# Patient Record
Sex: MALE | Race: WHITE | NOT HISPANIC OR LATINO | Employment: FULL TIME | URBAN - METROPOLITAN AREA
[De-identification: names, ages, dates, MRNs, and addresses within clinical notes are randomized per-mention and may not be internally consistent; named-entity substitution may affect disease eponyms.]

---

## 2017-11-01 ENCOUNTER — TRANSCRIBE ORDERS (OUTPATIENT)
Dept: ADMINISTRATIVE | Facility: HOSPITAL | Age: 57
End: 2017-11-01

## 2017-11-01 DIAGNOSIS — N50.89 LUMP IN SCROTUM: Primary | ICD-10-CM

## 2017-11-06 ENCOUNTER — HOSPITAL ENCOUNTER (OUTPATIENT)
Dept: ULTRASOUND IMAGING | Facility: HOSPITAL | Age: 57
Discharge: HOME | End: 2017-11-06
Attending: FAMILY MEDICINE
Payer: COMMERCIAL

## 2017-11-06 DIAGNOSIS — N50.89 LUMP IN SCROTUM: ICD-10-CM

## 2017-11-06 PROCEDURE — 76870 US EXAM SCROTUM: CPT

## 2019-08-13 ENCOUNTER — APPOINTMENT (OUTPATIENT)
Dept: RADIOLOGY | Facility: CLINIC | Age: 59
End: 2019-08-13
Payer: COMMERCIAL

## 2019-08-13 DIAGNOSIS — M54.6 PAIN IN THORACIC SPINE: ICD-10-CM

## 2019-08-13 PROCEDURE — 71046 X-RAY EXAM CHEST 2 VIEWS: CPT

## 2019-08-13 PROCEDURE — 72072 X-RAY EXAM THORAC SPINE 3VWS: CPT

## 2021-03-10 DIAGNOSIS — Z23 ENCOUNTER FOR IMMUNIZATION: ICD-10-CM

## 2021-07-10 ENCOUNTER — HOSPITAL ENCOUNTER (OUTPATIENT)
Dept: ULTRASOUND IMAGING | Facility: HOSPITAL | Age: 61
Discharge: HOME | End: 2021-07-10
Attending: FAMILY MEDICINE
Payer: COMMERCIAL

## 2021-07-10 DIAGNOSIS — K76.0 FATTY (CHANGE OF) LIVER, NOT ELSEWHERE CLASSIFIED: ICD-10-CM

## 2021-07-10 DIAGNOSIS — N28.1 CYST OF KIDNEY, ACQUIRED: ICD-10-CM

## 2021-07-10 PROCEDURE — 76700 US EXAM ABDOM COMPLETE: CPT

## 2021-10-11 ENCOUNTER — CONSULT (OUTPATIENT)
Dept: GASTROENTEROLOGY | Facility: CLINIC | Age: 61
End: 2021-10-11
Payer: COMMERCIAL

## 2021-10-11 VITALS
BODY MASS INDEX: 29.49 KG/M2 | HEART RATE: 78 BPM | WEIGHT: 206 LBS | DIASTOLIC BLOOD PRESSURE: 82 MMHG | HEIGHT: 70 IN | SYSTOLIC BLOOD PRESSURE: 150 MMHG

## 2021-10-11 DIAGNOSIS — K76.0 FATTY LIVER: Primary | ICD-10-CM

## 2021-10-11 DIAGNOSIS — Z98.890 HISTORY OF COLONOSCOPY: ICD-10-CM

## 2021-10-11 PROCEDURE — 99203 OFFICE O/P NEW LOW 30 MIN: CPT | Performed by: NURSE PRACTITIONER

## 2021-10-11 RX ORDER — CYANOCOBALAMIN (VITAMIN B-12) 500 MCG
TABLET ORAL DAILY
COMMUNITY

## 2021-10-11 RX ORDER — ATORVASTATIN CALCIUM 20 MG/1
20 TABLET, FILM COATED ORAL DAILY
COMMUNITY

## 2021-10-11 RX ORDER — ASPIRIN 81 MG/1
TABLET ORAL DAILY
COMMUNITY

## 2021-10-11 RX ORDER — LOSARTAN POTASSIUM 100 MG/1
25 TABLET ORAL DAILY
COMMUNITY

## 2021-10-11 RX ORDER — OMEPRAZOLE 20 MG/1
10 CAPSULE, DELAYED RELEASE ORAL DAILY
COMMUNITY

## 2021-10-22 LAB
LEFT EYE DIABETIC RETINOPATHY: NORMAL
RIGHT EYE DIABETIC RETINOPATHY: NORMAL

## 2022-04-20 ENCOUNTER — TELEPHONE (OUTPATIENT)
Dept: ENDOCRINOLOGY | Facility: HOSPITAL | Age: 62
End: 2022-04-20

## 2022-04-20 NOTE — TELEPHONE ENCOUNTER
Patient doesn't want to want till July  He'll call [address] office to see if he can get in soon

## 2022-06-14 ENCOUNTER — OFFICE VISIT (OUTPATIENT)
Dept: ENDOCRINOLOGY | Facility: CLINIC | Age: 62
End: 2022-06-14
Payer: COMMERCIAL

## 2022-06-14 ENCOUNTER — TELEPHONE (OUTPATIENT)
Dept: ADMINISTRATIVE | Facility: OTHER | Age: 62
End: 2022-06-14

## 2022-06-14 VITALS
DIASTOLIC BLOOD PRESSURE: 88 MMHG | BODY MASS INDEX: 28.92 KG/M2 | WEIGHT: 202 LBS | HEART RATE: 72 BPM | HEIGHT: 70 IN | SYSTOLIC BLOOD PRESSURE: 132 MMHG

## 2022-06-14 DIAGNOSIS — E55.9 VITAMIN D DEFICIENCY: ICD-10-CM

## 2022-06-14 DIAGNOSIS — E78.2 MIXED HYPERLIPIDEMIA: ICD-10-CM

## 2022-06-14 DIAGNOSIS — R53.83 FATIGUE, UNSPECIFIED TYPE: ICD-10-CM

## 2022-06-14 DIAGNOSIS — K76.0 NAFLD (NONALCOHOLIC FATTY LIVER DISEASE): ICD-10-CM

## 2022-06-14 DIAGNOSIS — E11.65 TYPE 2 DIABETES MELLITUS WITH HYPERGLYCEMIA, WITHOUT LONG-TERM CURRENT USE OF INSULIN (HCC): Primary | ICD-10-CM

## 2022-06-14 DIAGNOSIS — I10 HTN, GOAL BELOW 130/80: ICD-10-CM

## 2022-06-14 LAB — SL AMB POCT HEMOGLOBIN AIC: 6.4 (ref ?–6.5)

## 2022-06-14 PROCEDURE — 99204 OFFICE O/P NEW MOD 45 MIN: CPT | Performed by: INTERNAL MEDICINE

## 2022-06-14 PROCEDURE — 83036 HEMOGLOBIN GLYCOSYLATED A1C: CPT | Performed by: INTERNAL MEDICINE

## 2022-06-14 RX ORDER — METFORMIN HYDROCHLORIDE 500 MG/1
TABLET, EXTENDED RELEASE ORAL 2 TIMES DAILY
COMMUNITY
Start: 2022-04-24

## 2022-06-14 NOTE — PROGRESS NOTES
New Consult Note      CC: diabetes    History of Present Illness:   57 yr male with hx of type 2 diabetes, HTN, HLD, vitamin D deficiency, NAFLD, GERD and STAN  He was diagnosed with diabetes in 2020 after a few years of prediabetes  He reports mostly fasting hyperglycemia  He is unable to comply with CPAP  No symptoms reported  Home blood glucose monitoring: checks 3/day  Usually fasting in 130-150mg/dL range and postprandial in 100-200mg/dL range based on activity  Hypoglycemia: no    Current meds:  Metformin 1000mg po qdaily    Opthamology: no  Podiatry: no  vaccination: yes  Dental:caries tooth  Pancreatitis: no    Ace/ARB: losartan  Statin:lipitor  Thyroid issues: not known    There is no problem list on file for this patient      Past Medical History:   Diagnosis Date    Colon polyp     GERD (gastroesophageal reflux disease)     Hyperlipidemia     Hypertension       Past Surgical History:   Procedure Laterality Date    COLONOSCOPY        Family History   Problem Relation Age of Onset    Diabetes Paternal Grandfather     Colon polyps Neg Hx     Colon cancer Neg Hx      Social History     Tobacco Use    Smoking status: Never Smoker    Smokeless tobacco: Never Used   Substance Use Topics    Alcohol use: Yes     Comment: socially     No Known Allergies      Current Outpatient Medications:     aspirin (ECOTRIN LOW STRENGTH) 81 mg EC tablet, Take by mouth daily 1/2 tablet daily, Disp: , Rfl:     atorvastatin (LIPITOR) 20 mg tablet, Take 20 mg by mouth daily, Disp: , Rfl:     Cyanocobalamin (Vitamin B 12) 500 MCG TABS, Take by mouth daily, Disp: , Rfl:     losartan (COZAAR) 100 MG tablet, Take 25 mg by mouth daily, Disp: , Rfl:     metFORMIN (GLUCOPHAGE-XR) 500 mg 24 hr tablet, 2 (two) times a day, Disp: , Rfl:     omeprazole (PriLOSEC) 20 mg delayed release capsule, Take 10 mg by mouth daily, Disp: , Rfl:     Review of Systems   Constitutional: Positive for activity change, appetite change and fatigue  HENT: Negative  Eyes: Negative  Respiratory: Negative  Cardiovascular: Negative for chest pain  Gastrointestinal: Negative  Endocrine: Negative  Genitourinary: Negative  Musculoskeletal: Negative  Skin: Negative  Allergic/Immunologic: Negative  Neurological: Negative  Hematological: Negative  Psychiatric/Behavioral: Negative  All other systems reviewed and are negative  Physical Exam:  Body mass index is 28 98 kg/m²  /88 (BP Location: Left arm, Patient Position: Sitting, Cuff Size: Standard)   Pulse 72   Ht 5' 10" (1 778 m)   Wt 91 6 kg (202 lb)   BMI 28 98 kg/m²    Vitals:    06/14/22 1000   Weight: 91 6 kg (202 lb)        Physical Exam  Constitutional:       Appearance: He is well-developed  HENT:      Head: Normocephalic  Eyes:      Pupils: Pupils are equal, round, and reactive to light  Neck:      Thyroid: No thyromegaly  Cardiovascular:      Rate and Rhythm: Normal rate  Heart sounds: Normal heart sounds  Pulmonary:      Effort: Pulmonary effort is normal       Breath sounds: Normal breath sounds  Abdominal:      General: Bowel sounds are normal       Palpations: Abdomen is soft  Musculoskeletal:         General: No deformity  Cervical back: Normal range of motion  Skin:     Capillary Refill: Capillary refill takes less than 2 seconds  Coloration: Skin is not pale  Findings: No rash  Neurological:      Mental Status: He is alert and oriented to person, place, and time  Labs:   Lab Results   Component Value Date    HGBA1C 7 1 09/20/2021       No results found for: [id number], TSH, C1IILKV, S7JJREV, THYROIDAB    No results found for: CREATININE, BUN, NA, K, CL, CO2  No results found for: EGFR    No results found for: ALT, AST, GGT, ALKPHOS, BILITOT    No results found for: CHOLESTEROL  No results found for: HDL  No results found for: TRIG  No results found for: NONHDLC      Impression:  1   Type 2 diabetes mellitus with hyperglycemia, without long-term current use of insulin ([facility] )    2  HTN, goal below 130/80    3  Mixed hyperlipidemia    4  Vitamin D deficiency         Plan:    Diagnoses and all orders for this visit:    Type 2 diabetes mellitus with hyperglycemia, without long-term current use of insulin ([facility] )  He is controlled with A1c 6 4%  Goal is 5 7%  Today we discussed all aspects of diabetes/prediabetes including pathophysiology, risk factors including STAN/NAFLD/insulin resistance, complications, SAGM, diet, lifestyle modifications, medical fitness training, diabetes education, goals of therapy, follow up needs and medications including metformin and GLP1 agonists  Advised to maintain goal blood sugars 70-180mg/dL  Will aim to achieve goal with lifestyle changes only  Advised carb consistent diet, hydration, resistance training and 5-10 lb weight loss  If no improvement, may consider GLP1 agonist in future  Follow up in 6 months     -     POCT hemoglobin A1c  -     Hemoglobin A1C; Future  -     Microalbumin / creatinine urine ratio; Future    HTN, goal below 130/80  On losartan  Mixed hyperlipidemia  -     Lipid panel; Future    Vitamin D deficiency  -     Vitamin D 25 hydroxy; Future    NAFLD (nonalcoholic fatty liver disease)    Fatigue, unspecified type  -     T4, free; Future  -     TSH, 3rd generation; Future  -     CBC and differential; Future  -     Comprehensive metabolic panel; Future  -     PSA, total and free; Future        I have spent 60 minutes with patient today in which greater than 50% of this time was spent in counseling/coordination of care  Discussed with the patient and all questioned fully answered  He will call me if any problems arise  Educated/ Counseled patient on diagnostic test results, prognosis, risk vs benefit of treatment options, importance of treatment compliance, healthy life and lifestyle choices        [address]

## 2022-06-14 NOTE — TELEPHONE ENCOUNTER
Upon review of the In Basket request and the patient's chart, initial outreach has been made via fax, please see Contacts section for details       Thank you  Parker Barragan

## 2022-06-14 NOTE — LETTER
Diabetic Eye Exam Form    Date Requested: 22  Patient: Chidi Ritter  Patient : 1960   Referring Provider: Joseph Preciado MD    DIABETIC Eye Exam Date _______________________________    Type of Exam MUST be documented for Diabetic Eye Exams  Please CHECK ONE  Retinal Exam       Dilated Retinal Exam       OCT       Optomap-Iris Exam      Fundus Photography     Left Eye - Please check Retinopathy AND Type or No Retinopathy      Exam did show retinopathy    Exam did not show retinopathy         Mild     Proliferative           Moderate    Severe            None         Right Eye - Please check Retinopathy AND Type or No Retinopathy     Exam did show retinopathy    Exam did not show retinopathy         Mild     Proliferative        Moderate    Severe        None       Comments __________________________________________________________    Practice Providing Exam ______________________________________________    Exam Performed By (print name) _______________________________________      Provider Signature ___________________________________________________    These reports are needed for  compliance  Please fax this completed form and a copy of the Diabetic Eye Exam report to our office located at [place] [address] as soon as possible via [phone] attention Rivera Pretty: Phone [phone]  We thank you for your assistance in treating our mutual patient

## 2022-06-14 NOTE — TELEPHONE ENCOUNTER
----- Message from Maxime Pisano sent at 6/14/2022 10:05 AM EDT -----  Regarding: HM DM EYE EXAM  06/14/22 10:06 AM    Hello, our patient Farnaz Toussaint has had Diabetic Eye Exam completed/performed   Please assist in updating the patient chart by calling  [facility] [place] , [facility], Thank you,  Holli Perez  [facility]

## 2022-06-17 NOTE — TELEPHONE ENCOUNTER
Upon review of the In Basket request we were able to locate, review, and update the patient chart as requested for Diabetic Eye Exam     Any additional questions or concerns should be emailed to the Practice Liaisons via [email or web address] email, please do not reply via In Basket      Thank you  Agnes Calderon

## 2022-07-12 LAB
25(OH)D3 SERPL-MCNC: 35 NG/ML (ref 30–100)
ALBUMIN SERPL-MCNC: 4.5 G/DL (ref 3.6–5.1)
ALBUMIN/CREAT UR: NORMAL MCG/MG CREAT
ALBUMIN/GLOB SERPL: 1.9 (CALC) (ref 1–2.5)
ALP SERPL-CCNC: 56 U/L (ref 35–144)
ALT SERPL-CCNC: 31 U/L (ref 9–46)
AST SERPL-CCNC: 21 U/L (ref 10–35)
BASOPHILS # BLD AUTO: 39 CELLS/UL (ref 0–200)
BASOPHILS NFR BLD AUTO: 0.7 %
BILIRUB SERPL-MCNC: 1.1 MG/DL (ref 0.2–1.2)
BUN SERPL-MCNC: 19 MG/DL (ref 7–25)
BUN/CREAT SERPL: ABNORMAL (CALC) (ref 6–22)
CALCIUM SERPL-MCNC: 9.5 MG/DL (ref 8.6–10.3)
CHLORIDE SERPL-SCNC: 101 MMOL/L (ref 98–110)
CHOLEST SERPL-MCNC: 117 MG/DL
CHOLEST/HDLC SERPL: 3.2 (CALC)
CO2 SERPL-SCNC: 30 MMOL/L (ref 20–32)
CREAT SERPL-MCNC: 1.03 MG/DL (ref 0.7–1.35)
CREAT UR-MCNC: 80 MG/DL (ref 20–320)
EOSINOPHIL # BLD AUTO: 101 CELLS/UL (ref 15–500)
EOSINOPHIL NFR BLD AUTO: 1.8 %
ERYTHROCYTE [DISTWIDTH] IN BLOOD BY AUTOMATED COUNT: 12.2 % (ref 11–15)
GFR/BSA.PRED SERPLBLD CYS-BASED-ARV: 82 ML/MIN/1.73M2
GLOBULIN SER CALC-MCNC: 2.4 G/DL (CALC) (ref 1.9–3.7)
GLUCOSE SERPL-MCNC: 125 MG/DL (ref 65–99)
HBA1C MFR BLD: 6.6 % OF TOTAL HGB
HCT VFR BLD AUTO: 43.9 % (ref 38.5–50)
HDLC SERPL-MCNC: 37 MG/DL
HGB BLD-MCNC: 14.6 G/DL (ref 13.2–17.1)
LDLC SERPL CALC-MCNC: 60 MG/DL (CALC)
LYMPHOCYTES # BLD AUTO: 1988 CELLS/UL (ref 850–3900)
LYMPHOCYTES NFR BLD AUTO: 35.5 %
MCH RBC QN AUTO: 30 PG (ref 27–33)
MCHC RBC AUTO-ENTMCNC: 33.3 G/DL (ref 32–36)
MCV RBC AUTO: 90.3 FL (ref 80–100)
MICROALBUMIN UR-MCNC: <0.2 MG/DL
MONOCYTES # BLD AUTO: 409 CELLS/UL (ref 200–950)
MONOCYTES NFR BLD AUTO: 7.3 %
NEUTROPHILS # BLD AUTO: 3063 CELLS/UL (ref 1500–7800)
NEUTROPHILS NFR BLD AUTO: 54.7 %
NONHDLC SERPL-MCNC: 80 MG/DL (CALC)
PLATELET # BLD AUTO: 194 THOUSAND/UL (ref 140–400)
PMV BLD REES-ECKER: 10.3 FL (ref 7.5–12.5)
POTASSIUM SERPL-SCNC: 4 MMOL/L (ref 3.5–5.3)
PROT SERPL-MCNC: 6.9 G/DL (ref 6.1–8.1)
PSA FREE MFR SERPL: 40 % (CALC)
PSA FREE SERPL-MCNC: 0.4 NG/ML
PSA SERPL-MCNC: 1 NG/ML
RBC # BLD AUTO: 4.86 MILLION/UL (ref 4.2–5.8)
SODIUM SERPL-SCNC: 139 MMOL/L (ref 135–146)
T4 FREE SERPL-MCNC: 1.3 NG/DL (ref 0.8–1.8)
TRIGL SERPL-MCNC: 118 MG/DL
TSH SERPL-ACNC: 1.19 MIU/L (ref 0.4–4.5)
WBC # BLD AUTO: 5.6 THOUSAND/UL (ref 3.8–10.8)

## 2022-07-24 ENCOUNTER — HOSPITAL ENCOUNTER (OUTPATIENT)
Dept: RADIOLOGY | Facility: HOSPITAL | Age: 62
Discharge: HOME | End: 2022-07-24
Payer: COMMERCIAL

## 2022-07-24 DIAGNOSIS — R10.11 ABDOMINAL PAIN, RIGHT UPPER QUADRANT: ICD-10-CM

## 2022-07-24 PROCEDURE — 76705 ECHO EXAM OF ABDOMEN: CPT

## 2022-10-28 LAB
LEFT EYE DIABETIC RETINOPATHY: NORMAL
RIGHT EYE DIABETIC RETINOPATHY: NORMAL

## 2022-12-17 LAB — HBA1C MFR BLD HPLC: 6.5 %

## 2022-12-21 NOTE — PROGRESS NOTES
Established Patient Progress Note       Chief Complaint   Patient presents with   • Diabetes Type 2      History of Present Illness:     Steven Diehl is a 60 y o  male with a history of prediabetes progressed to T2DM in 2020 started on metformin and controlled with lifestyle modifications with no micro or macrovascular complications with other PMHx of NAFLD, hyperlipidemia, vitB-12 def and overweight who was previously seen by Dr Everett Yost in 06/22 as new patient now presents for transfer of care  Previous notes/labs reviewed  Patient's HbA1C during last visit was at 6 4% and therefore no change to regime made  He currently reports denies any polydipsia but reports polyuria since being on higher dose of HCTZ, Denies any headaches, occasionally gets blurry vision  Denies any nephropathy, retinopathy  But reports having neuropathy b/l foot until skin for 20+ years- stable and unchanging  Blood Sugar/Glucometer/Pump/CGM review:    - Did not bring meter to visit  Checks fasting and pre-dinner  Fasting 130's  Predinner 150-160's range   Current regime:- Metformin 500mg XR BID(Started 1 5 years ago- Aug 2021)- doesn't have diarrhea per day but does have stool urgency since starting metformin  Medications tried/failed in past:- Above  Hypoglycemic episodes: None  Diet:- 3 meals, and 1-2 snacks during the day  Bk bowl of cold cereal- cheerios, Snack fruits, Ln 2 sandwiches ham/ meat- tries to eat with wheat bread, Dn wife cooks green veggies/chicken/pork  Activity- Doesn't get as much activity as he needs- works 10hrs a day  Gets home late and then doesn't have much energy at night  Does go for a walk or use treadmill in house in weekends  Sometimes  Weight- Stable   No weight increase  HbA1C:-    Latest Reference Range & Units 09/20/21 00:00 06/14/22 10:30 07/11/22 08:22   Hemoglobin A1C <5 7 % of total Hgb 7 1 (E) 6 4 6 6 (H)   (H): Data is abnormally high  (E): External lab result  HbA1c 12/22:- 6 5% (Done with PCP- scanned in chart)    last eye exam - 1 month ago- dilated exam- normal  b/l  last foot exam - Saw someone 2 years ago  Done today 12/22  History of hypertension:- Yes is on losartan 100mg daily   History of hyperlipidemia:- Yes is on lipitor 20mg daily, Last lipid 12/22- , HDL 33, LDL 61  Patient Active Problem List   Diagnosis   • Type 2 diabetes mellitus with hyperglycemia, without long-term current use of insulin ([facility] )   • HTN, goal below 130/80   • Mixed hyperlipidemia   • Vitamin D deficiency   • NAFLD (nonalcoholic fatty liver disease)   • Fatigue      Past Medical History:   Diagnosis Date   • Colon polyp    • GERD (gastroesophageal reflux disease)    • Hyperlipidemia    • Hypertension       Past Surgical History:   Procedure Laterality Date   • COLONOSCOPY        Family History   Problem Relation Age of Onset   • Diabetes Paternal Grandfather    • Colon polyps Neg Hx    • Colon cancer Neg Hx      Social History     Tobacco Use   • Smoking status: Never   • Smokeless tobacco: Never   Substance Use Topics   • Alcohol use: Yes     Comment: socially     No Known Allergies    Current Outpatient Medications:   •  aspirin (ECOTRIN LOW STRENGTH) 81 mg EC tablet, Take by mouth daily 1/2 tablet daily, Disp: , Rfl:   •  atorvastatin (LIPITOR) 20 mg tablet, Take 20 mg by mouth daily, Disp: , Rfl:   •  Cyanocobalamin (Vitamin B 12) 500 MCG TABS, Take by mouth daily, Disp: , Rfl:   •  losartan-hydrochlorothiazide (HYZAAR) 100-25 MG per tablet, TAKE 1 TABLET BY MOUTH EVERY DAY FOR 30 DAYS, Disp: , Rfl:   •  metFORMIN (GLUCOPHAGE-XR) 500 mg 24 hr tablet, Take 4 tablets (2,000 mg total) by mouth daily with breakfast, Disp: 480 tablet, Rfl: 1  •  omeprazole (PriLOSEC) 20 mg delayed release capsule, Take 10 mg by mouth daily, Disp: , Rfl:     Review of Systems   Constitutional: Positive for fatigue  Negative for diaphoresis and unexpected weight change  Respiratory: Negative for shortness of breath  Cardiovascular: Negative for chest pain and palpitations  Gastrointestinal: Negative for constipation and diarrhea  Endocrine: Positive for polyuria  Negative for polydipsia and polyphagia  Skin: Negative  Neurological: Positive for light-headedness and numbness  Negative for headaches  Physical Exam:  Body mass index is 28 9 kg/m²  /70   Pulse 77   Ht 5' 10" (1 778 m)   Wt 91 4 kg (201 lb 6 4 oz)   BMI 28 90 kg/m²    Wt Readings from Last 3 Encounters:   12/22/22 91 4 kg (201 lb 6 4 oz)   06/14/22 91 6 kg (202 lb)   10/11/21 93 4 kg (206 lb)       Physical Exam  Constitutional:       Appearance: Normal appearance  Cardiovascular:      Rate and Rhythm: Normal rate and regular rhythm  Pulses: Normal pulses  no weak pulses          Dorsalis pedis pulses are 2+ on the right side and 2+ on the left side  Pulmonary:      Effort: Pulmonary effort is normal    Abdominal:      General: Abdomen is flat  Palpations: Abdomen is soft  Feet:      Right foot:      Skin integrity: No ulcer, skin breakdown, erythema, warmth, callus or dry skin  Left foot:      Skin integrity: No ulcer, skin breakdown, erythema, warmth, callus or dry skin  Skin:     General: Skin is warm  Capillary Refill: Capillary refill takes less than 2 seconds  Neurological:      General: No focal deficit present  Mental Status: He is alert  Psychiatric:         Mood and Affect: Mood normal        Patient's shoes and socks removed  Right Foot/Ankle   Right Foot Inspection  Skin Exam: skin normal and skin intact  No dry skin, no warmth, no callus, no erythema, no maceration, no abnormal color, no pre-ulcer, no ulcer and no callus  Toe Exam: ROM and strength within normal limits  Sensory   Vibration: absent  Monofilament testing: absent    Vascular  Capillary refills: < 3 seconds  The right DP pulse is 2+  Left Foot/Ankle  Left Foot Inspection  Skin Exam: skin normal and skin intact  No dry skin, no warmth, no erythema, no maceration, normal color, no pre-ulcer, no ulcer and no callus  Toe Exam: ROM and strength within normal limits  Sensory   Vibration: absent  Monofilament testing: absent    Vascular  Capillary refills: < 3 seconds  The left DP pulse is 2+       Assign Risk Category  No deformity present  Loss of protective sensation  No weak pulses  Risk: 1    Labs:    Latest Reference Range & Units 07/11/22 08:22   Sodium 135 - 146 mmol/L 139   Potassium 3 5 - 5 3 mmol/L 4 0   Chloride 98 - 110 mmol/L 101   CO2 20 - 32 mmol/L 30   BUN 7 - 25 mg/dL 19   Creatinine 0 70 - 1 35 mg/dL 1 03   SL AMB BUN/CREATININE RATIO 6 - 22 (calc) NOT APPLICABLE   Glucose, Random 65 - 99 mg/dL 125 (H)   Calcium 8 6 - 10 3 mg/dL 9 5   AST 10 - 35 U/L 21   ALT 9 - 46 U/L 31   Alkaline Phosphatase 35 - 144 U/L 56   Total Protein 6 1 - 8 1 g/dL 6 9   Albumin 3 6 - 5 1 g/dL 4 5   TOTAL BILIRUBIN 0 2 - 1 2 mg/dL 1 1   eGFR > OR = 60 mL/min/1 73m2 82   Albumin/Globulin Ratio 1 0 - 2 5 (calc) 1 9   Cholesterol <200 mg/dL 117   Triglycerides <150 mg/dL 118   HDL > OR = 40 mg/dL 37 (L)   Non-HDL Cholesterol <130 mg/dL (calc) 80   LDL Calculated mg/dL (calc) 60   Chol HDLC Ratio <5 0 (calc) 3 2   (H): Data is abnormally high  (L): Data is abnormally low     10/22/21 08:19   Right Eye Diabetic Retinopathy None (E)   (E): External lab result     10/22/21 08:19   Left Eye Diabetic Retinopathy None (E)   (E): External lab result     Latest Reference Range & Units 07/11/22 08:22   EXT Creatinine Urine 20 - 320 mg/dL 80   MICROALBUMIN/CREATININE RATIO <30 mcg/mg creat NOTE   MICROALBUM ,U,RANDOM See Note: mg/dL <0 2      Latest Reference Range & Units 07/11/22 08:22   EXTERNAL VITAMIN D,25 30 - 100 ng/mL 35     Lab Results   Component Value Date    FREET4 1 3 07/11/2022     Repeat labs 12/22- in chart    Impression & Plan:    Problem List Items Addressed This Visit        Endocrine    Type 2 diabetes mellitus with hyperglycemia, without long-term current use of insulin (HCC) - Primary    Relevant Medications    metFORMIN (GLUCOPHAGE-XR) 500 mg 24 hr tablet    Other Relevant Orders    Vitamin B12       Orders Placed This Encounter   Procedures   • Vitamin B12     Standing Status:   Future     Standing Expiration Date:   12/22/2023       There are no Patient Instructions on file for this visit  61yM with PMHx of T2DM diagnosed in 2020 currently well controlled while on metformin 500mg BID and lifestyle modifications with no microvascular (does have neuropathy but NOT diabetes related >20yrs) or macrovascular complications with other PMHx of NAFLD, hypertension and overweight who presents today to discuss his diabetes care  Patient is currently on metformin 500mg  XR BID, currently his BG shows fasting BG in diabetic range with his current HbA1C is at 6 5%, Previously 6 6% 07/2022  Therefore would recommend increasing his metformin to max dose of 2000mg XR daily (discussed can be daily or BID but max dose 2000mg)  Also discussed lifestyle changes to help improve his diabetes and also weight loss  Currently diet high in carbs and also activity very minimal  Did discuss that in future if diabetes still not under control and consider additional GLP-1 agonist use specially since has NAFLD but holding off for now until optimized on lifestyle and metformin  Discussed fasting BG  range and premeal  is acceptable for diabetes control  But can aim for lower  Can aim for HbA1C <5 7% (non diabetic range) but will keep goal to keep in prediabetes for now and then aim for non diabetic range        Screening:-   Discussed last retinopathy 11/22- do not have records, recommended these be send to us   Uptodate on lipid and urine microalbumin screen- repeat 07/23- urine and 12/23 for lipid- annual   C/w lipitor 20mg daily for now    Hypertension:- c/w losartan 100mg daily     Neuropathy:- Did discuss with patient about his neuropathy- not diabetes related  Has tried gabapentin in past and doesn't want to try it  Recommended to consider seeing a neurologist for full workup  I will check his B12 level and make sure this is ok  On metformin but neuropathy prior to starting this and also prior to diagnosis of diabetes  Thus not likely related to his diabetes  RTC in 6 months     Discussed with the patient and all questioned fully answered  He will call me if any problems arise  Counseled patient on diagnostic results, prognosis, risk and benefit of treatment options, instruction for management, importance of treatment compliance, Risk  factor reduction and impressions    I have spent 45 minutes with Patient  today in which greater than 50% of this time was spent in counseling/coordination of care regarding Diagnostic results, Prognosis, Risks and benefits of tx options, Intructions for management and Patient and family education          Janak Mcdermott MD

## 2022-12-22 ENCOUNTER — OFFICE VISIT (OUTPATIENT)
Dept: ENDOCRINOLOGY | Facility: HOSPITAL | Age: 62
End: 2022-12-22

## 2022-12-22 VITALS
HEART RATE: 77 BPM | DIASTOLIC BLOOD PRESSURE: 70 MMHG | HEIGHT: 70 IN | SYSTOLIC BLOOD PRESSURE: 126 MMHG | BODY MASS INDEX: 28.83 KG/M2 | WEIGHT: 201.4 LBS

## 2022-12-22 DIAGNOSIS — E11.65 TYPE 2 DIABETES MELLITUS WITH HYPERGLYCEMIA, WITHOUT LONG-TERM CURRENT USE OF INSULIN (HCC): Primary | ICD-10-CM

## 2022-12-22 RX ORDER — METFORMIN HYDROCHLORIDE 500 MG/1
2000 TABLET, EXTENDED RELEASE ORAL
Qty: 480 TABLET | Refills: 1 | Status: SHIPPED | OUTPATIENT
Start: 2022-12-22 | End: 2022-12-22 | Stop reason: SDUPTHER

## 2022-12-22 RX ORDER — LOSARTAN POTASSIUM AND HYDROCHLOROTHIAZIDE 25; 100 MG/1; MG/1
TABLET ORAL
COMMUNITY
Start: 2022-12-13

## 2022-12-22 RX ORDER — METFORMIN HYDROCHLORIDE 500 MG/1
2000 TABLET, EXTENDED RELEASE ORAL
Qty: 480 TABLET | Refills: 1 | Status: SHIPPED | OUTPATIENT
Start: 2022-12-22

## 2022-12-24 LAB — VIT B12 SERPL-MCNC: 695 PG/ML (ref 232–1245)

## 2023-01-26 LAB
CREAT ?TM UR-SCNC: 88 UMOL/L
EXT MICROALBUMIN URINE RANDOM: 0.3
MICROALBUMIN/CREAT UR: 3 MG/G{CREAT}

## 2023-05-19 ENCOUNTER — APPOINTMENT (OUTPATIENT)
Dept: LAB | Facility: HOSPITAL | Age: 63
End: 2023-05-19

## 2023-05-19 ENCOUNTER — OFFICE VISIT (OUTPATIENT)
Dept: SURGERY | Facility: HOSPITAL | Age: 63
End: 2023-05-19

## 2023-05-19 VITALS
RESPIRATION RATE: 16 BRPM | BODY MASS INDEX: 28.56 KG/M2 | HEIGHT: 71 IN | SYSTOLIC BLOOD PRESSURE: 129 MMHG | HEART RATE: 63 BPM | WEIGHT: 204 LBS | DIASTOLIC BLOOD PRESSURE: 78 MMHG | TEMPERATURE: 96.6 F

## 2023-05-19 DIAGNOSIS — R22.2 MASS OF CHEST WALL: Primary | ICD-10-CM

## 2023-05-19 DIAGNOSIS — R22.2 MASS OF CHEST WALL: ICD-10-CM

## 2023-05-19 LAB
ANION GAP SERPL CALCULATED.3IONS-SCNC: 5 MMOL/L (ref 4–13)
ATRIAL RATE: 70 BPM
BASOPHILS # BLD AUTO: 0.05 THOUSANDS/ÂΜL (ref 0–0.1)
BASOPHILS NFR BLD AUTO: 1 % (ref 0–1)
BUN SERPL-MCNC: 20 MG/DL (ref 5–25)
CALCIUM SERPL-MCNC: 9.3 MG/DL (ref 8.3–10.1)
CHLORIDE SERPL-SCNC: 101 MMOL/L (ref 96–108)
CO2 SERPL-SCNC: 29 MMOL/L (ref 21–32)
CREAT SERPL-MCNC: 1.16 MG/DL (ref 0.6–1.3)
EOSINOPHIL # BLD AUTO: 0.09 THOUSAND/ÂΜL (ref 0–0.61)
EOSINOPHIL NFR BLD AUTO: 2 % (ref 0–6)
ERYTHROCYTE [DISTWIDTH] IN BLOOD BY AUTOMATED COUNT: 12.2 % (ref 11.6–15.1)
EST. AVERAGE GLUCOSE BLD GHB EST-MCNC: 131 MG/DL
GFR SERPL CREATININE-BSD FRML MDRD: 66 ML/MIN/1.73SQ M
GLUCOSE SERPL-MCNC: 262 MG/DL (ref 65–140)
HBA1C MFR BLD: 6.2 %
HCT VFR BLD AUTO: 46.7 % (ref 36.5–49.3)
HGB BLD-MCNC: 15.5 G/DL (ref 12–17)
IMM GRANULOCYTES # BLD AUTO: 0.01 THOUSAND/UL (ref 0–0.2)
IMM GRANULOCYTES NFR BLD AUTO: 0 % (ref 0–2)
LYMPHOCYTES # BLD AUTO: 1.78 THOUSANDS/ÂΜL (ref 0.6–4.47)
LYMPHOCYTES NFR BLD AUTO: 37 % (ref 14–44)
MCH RBC QN AUTO: 30.8 PG (ref 26.8–34.3)
MCHC RBC AUTO-ENTMCNC: 33.2 G/DL (ref 31.4–37.4)
MCV RBC AUTO: 93 FL (ref 82–98)
MONOCYTES # BLD AUTO: 0.29 THOUSAND/ÂΜL (ref 0.17–1.22)
MONOCYTES NFR BLD AUTO: 6 % (ref 4–12)
NEUTROPHILS # BLD AUTO: 2.6 THOUSANDS/ÂΜL (ref 1.85–7.62)
NEUTS SEG NFR BLD AUTO: 54 % (ref 43–75)
NRBC BLD AUTO-RTO: 0 /100 WBCS
P AXIS: 15 DEGREES
PLATELET # BLD AUTO: 222 THOUSANDS/UL (ref 149–390)
PMV BLD AUTO: 11.3 FL (ref 8.9–12.7)
POTASSIUM SERPL-SCNC: 3.9 MMOL/L (ref 3.5–5.3)
PR INTERVAL: 122 MS
QRS AXIS: 5 DEGREES
QRSD INTERVAL: 104 MS
QT INTERVAL: 404 MS
QTC INTERVAL: 436 MS
RBC # BLD AUTO: 5.04 MILLION/UL (ref 3.88–5.62)
SODIUM SERPL-SCNC: 135 MMOL/L (ref 135–147)
T WAVE AXIS: 22 DEGREES
VENTRICULAR RATE: 70 BPM
WBC # BLD AUTO: 4.82 THOUSAND/UL (ref 4.31–10.16)

## 2023-05-19 RX ORDER — CEFAZOLIN SODIUM 2 G/50ML
2000 SOLUTION INTRAVENOUS ONCE
OUTPATIENT
Start: 2023-05-19 | End: 2023-05-19

## 2023-05-19 RX ORDER — SODIUM CHLORIDE, SODIUM LACTATE, POTASSIUM CHLORIDE, CALCIUM CHLORIDE 600; 310; 30; 20 MG/100ML; MG/100ML; MG/100ML; MG/100ML
125 INJECTION, SOLUTION INTRAVENOUS CONTINUOUS
OUTPATIENT
Start: 2023-06-07

## 2023-05-19 NOTE — H&P (VIEW-ONLY)
Assessment/Plan:   Omid Otto is a 60 y o male who is here for follow-up of right chest cyst   Patient was seen for infection of this cyst on April 14, 2023  He required an office incision and drainage procedure and antibiotic treatment  Patient recovered well and area is now closed, however he returns to discuss surgical removal of cyst cavity to prevent further infection  Patient is diabetic  Right chest cyst  -s/p I&D 4/14/2023 and antibiotic treatment for infection of cyst cavity  -Patient found to have underlying abscess with ruptured cyst fragments at time of I&D  Area was difficult to anesthetize due to infection and entire cyst cavity was unable to be removed at that time  -Will schedule for excision of chest wall cyst cavity to be done in the operating room due to size of cyst cavity approximately 3 cm and location of cyst   Made the difficult to close incision site in this area  Seizure discussed in detail with the patient as well as possible risks and complications and written consent has been obtained  All questions answered  We will obtain insurance authorization   -Patient will require sedation anesthesia  Will need preadmission blood work and EKG prior to proceeding  DM2  -We will check hemoglobin A1c  -Structured to continue tight blood sugar control for optimal wound healing postoperatively        HPI:  Omid Otto is a 60 y o male who returns for follow-up of right chest cyst   Patient was seen for infection of this cyst on April 14, 2023  He required an office incision and drainage procedure and antibiotic treatment  Patient recovered well and area is now closed, however he returns to discuss surgical removal of cyst cavity to prevent further infection  Patient is diabetic  Patient denies current pain at the site  States that cyst has been present for many years  Occasionally notes swelling and tenderness at site    Treated for previous infection as well as I&D procedure as above  Patient would like to proceed with excision of cyst to prevent further episodes of infection and swelling  Patient is diabetic  Hemoglobin A1c was 6 6 in July 2022     ROS:  General ROS: negative  negative for - chills, fatigue, fever or night sweats, weight loss  Respiratory ROS: no cough, shortness of breath, or wheezing  Cardiovascular ROS: no chest pain or dyspnea on exertion  Abdomen ROS: no pain , N/V  Genito-Urinary ROS: no dysuria, trouble voiding, or hematuria  Musculoskeletal ROS: negative for - gait disturbance, joint pain or muscle pain  Neurological ROS: no TIA or stroke symptoms  Skin ROS: See HPI    ALLERGIES  Amlodipine besy-benazepril hcl    Current Outpatient Medications:   •  aspirin (ECOTRIN LOW STRENGTH) 81 mg EC tablet, Take by mouth daily 1/2 tablet daily, Disp: , Rfl:   •  atorvastatin (LIPITOR) 20 mg tablet, Take 20 mg by mouth daily, Disp: , Rfl:   •  Cyanocobalamin (Vitamin B 12) 500 MCG TABS, Take by mouth daily, Disp: , Rfl:   •  losartan-hydrochlorothiazide (HYZAAR) 100-25 MG per tablet, TAKE 1 TABLET BY MOUTH EVERY DAY FOR 30 DAYS, Disp: , Rfl:   •  metFORMIN (GLUCOPHAGE-XR) 500 mg 24 hr tablet, Take 4 tablets (2,000 mg total) by mouth daily with breakfast, Disp: 480 tablet, Rfl: 1  •  omeprazole (PriLOSEC) 20 mg delayed release capsule, Take 10 mg by mouth daily, Disp: , Rfl:   Past Medical History:   Diagnosis Date   • Colon polyp    • GERD (gastroesophageal reflux disease)    • Hyperlipidemia    • Hypertension      Past Surgical History:   Procedure Laterality Date   • COLONOSCOPY       Family History   Problem Relation Age of Onset   • Diabetes Paternal Grandfather    • Colon polyps Neg Hx    • Colon cancer Neg Hx       reports that he has never smoked  He has never used smokeless tobacco  He reports current alcohol use  He reports that he does not use drugs      PHYSICAL EXAM    Vitals:    05/19/23 0859   BP: 129/78   Pulse: 63   Resp: 16   Temp: (!) 96 6 °F (35 9 °C) Weight (last 2 days)     Date/Time Weight    05/19/23 0859 92 5 (204)          General Appearance:    Alert, cooperative, no distress   Head:    Normocephalic without obvious abnormality   Eyes:    Conjunctiva/corneas clear, EOM's intact        Neck:   Supple, no adenopathy, no JVD   Back:     Symmetric, no spinal or CVA tenderness   Lungs:     Clear to auscultation bilaterally, no wheezing or rhonchi   Heart:    Regular rate and rhythm, S1 and S2 normal, no murmur   Abdomen:     Benign, no rebound or guarding  Extremities:   Extremities normal  No clubbing, cyanosis or edema   Psych:   Normal Affect, AOx3  Neurologic:  Skin:   CNII-XII intact  Strength symmetric, speech intact    Warm, dry, intact  Large approximately 3 cm raised area with palpable underlying lump consistent with cyst cavity in mid chest, nontender, no surrounding erythema or signs of infection             Marry Buck

## 2023-05-26 ENCOUNTER — ANESTHESIA EVENT (OUTPATIENT)
Dept: PERIOP | Facility: HOSPITAL | Age: 63
End: 2023-05-26
Payer: COMMERCIAL

## 2023-06-01 NOTE — PRE-PROCEDURE INSTRUCTIONS
Pre-Surgery Instructions:   Medication Instructions   • aspirin (ECOTRIN LOW STRENGTH) 81 mg EC tablet Stop taking 7 days prior to surgery  • atorvastatin (LIPITOR) 20 mg tablet Take day of surgery  • Cyanocobalamin (Vitamin B 12) 500 MCG TABS Stop taking 7 days prior to surgery  • losartan-hydrochlorothiazide (HYZAAR) 100-25 MG per tablet Hold day of surgery  • metFORMIN (GLUCOPHAGE-XR) 500 mg 24 hr tablet Hold day of surgery  • omeprazole (PriLOSEC) 20 mg delayed release capsule Take day of surgery  Medication instructions for day surgery reviewed  Please use only a sip of water to take your instructed medications  Avoid all over the counter vitamins, supplements and NSAIDS for one week prior to surgery per anesthesia guidelines  Tylenol is ok to take as needed  You will receive a call one business day prior to surgery with an arrival time and hospital directions  If your surgery is scheduled on a Monday, the hospital will be calling you on the Friday prior to your surgery  If you have not heard from anyone by 8pm, please call the hospital supervisor through the hospital  at [phone]  [facility] [phone])  Do not eat or drink anything after midnight the night before your surgery, including candy, mints, lifesavers, or chewing gum  Do not drink alcohol 24hrs before your surgery  Try not to smoke at least 24hrs before your surgery  Follow the pre surgery showering instructions as listed in the [facility] Surgical Experience Booklet” or otherwise provided by your surgeon's office  Do not shave the surgical area 24 hours before surgery  Do not apply any lotions, creams, including makeup, cologne, deodorant, or perfumes after showering on the day of your surgery  No contact lenses, eye make-up, or artificial eyelashes  Remove nail polish, including gel polish, and any artificial, gel, or acrylic nails if possible  Remove all jewelry including rings and body piercing jewelry  Wear causal clothing that is easy to take on and off  Consider your type of surgery  Keep any valuables, jewelry, piercings at home  Please bring any specially ordered equipment (sling, braces) if indicated  Arrange for a responsible person to drive you to and from the hospital on the day of your surgery  Visitor Guidelines discussed  Call the surgeon's office with any new illnesses, exposures, or additional questions prior to surgery  Please reference your [facility] Surgical Experience Booklet” for additional information to prepare for your upcoming surgery

## 2023-06-06 RX ORDER — AZITHROMYCIN 250 MG/1
250 TABLET, FILM COATED ORAL 2 TIMES DAILY
COMMUNITY

## 2023-06-07 ENCOUNTER — HOSPITAL ENCOUNTER (OUTPATIENT)
Facility: HOSPITAL | Age: 63
Setting detail: OUTPATIENT SURGERY
Discharge: HOME | End: 2023-06-07
Attending: SURGERY | Admitting: SURGERY
Payer: COMMERCIAL

## 2023-06-07 ENCOUNTER — ANESTHESIA (OUTPATIENT)
Dept: PERIOP | Facility: HOSPITAL | Age: 63
End: 2023-06-07
Payer: COMMERCIAL

## 2023-06-07 VITALS
BODY MASS INDEX: 27.78 KG/M2 | OXYGEN SATURATION: 98 % | WEIGHT: 198.41 LBS | HEART RATE: 68 BPM | HEIGHT: 71 IN | TEMPERATURE: 98.3 F | SYSTOLIC BLOOD PRESSURE: 139 MMHG | DIASTOLIC BLOOD PRESSURE: 78 MMHG | RESPIRATION RATE: 13 BRPM

## 2023-06-07 DIAGNOSIS — R22.2 MASS OF CHEST WALL: ICD-10-CM

## 2023-06-07 LAB — GLUCOSE SERPL-MCNC: 123 MG/DL (ref 65–140)

## 2023-06-07 PROCEDURE — 11403 EXC TR-EXT B9+MARG 2.1-3CM: CPT | Performed by: SURGERY

## 2023-06-07 PROCEDURE — 88304 TISSUE EXAM BY PATHOLOGIST: CPT | Performed by: PATHOLOGY

## 2023-06-07 PROCEDURE — 12032 INTMD RPR S/A/T/EXT 2.6-7.5: CPT | Performed by: SURGERY

## 2023-06-07 PROCEDURE — 82948 REAGENT STRIP/BLOOD GLUCOSE: CPT

## 2023-06-07 RX ORDER — FENTANYL CITRATE/PF 50 MCG/ML
50 SYRINGE (ML) INJECTION
Status: DISCONTINUED | OUTPATIENT
Start: 2023-06-07 | End: 2023-06-07 | Stop reason: HOSPADM

## 2023-06-07 RX ORDER — SODIUM CHLORIDE, SODIUM LACTATE, POTASSIUM CHLORIDE, CALCIUM CHLORIDE 600; 310; 30; 20 MG/100ML; MG/100ML; MG/100ML; MG/100ML
125 INJECTION, SOLUTION INTRAVENOUS CONTINUOUS
Status: DISCONTINUED | OUTPATIENT
Start: 2023-06-07 | End: 2023-06-07 | Stop reason: HOSPADM

## 2023-06-07 RX ORDER — ONDANSETRON 2 MG/ML
INJECTION INTRAMUSCULAR; INTRAVENOUS AS NEEDED
Status: DISCONTINUED | OUTPATIENT
Start: 2023-06-07 | End: 2023-06-07

## 2023-06-07 RX ORDER — FENTANYL CITRATE 50 UG/ML
INJECTION, SOLUTION INTRAMUSCULAR; INTRAVENOUS AS NEEDED
Status: DISCONTINUED | OUTPATIENT
Start: 2023-06-07 | End: 2023-06-07

## 2023-06-07 RX ORDER — SENNOSIDES 8.6 MG
650 CAPSULE ORAL EVERY 8 HOURS PRN
Qty: 30 TABLET | Refills: 0 | COMMUNITY
Start: 2023-06-07

## 2023-06-07 RX ORDER — DEXAMETHASONE SODIUM PHOSPHATE 10 MG/ML
INJECTION, SOLUTION INTRAMUSCULAR; INTRAVENOUS AS NEEDED
Status: DISCONTINUED | OUTPATIENT
Start: 2023-06-07 | End: 2023-06-07

## 2023-06-07 RX ORDER — IBUPROFEN 800 MG/1
800 TABLET ORAL EVERY 8 HOURS PRN
Qty: 30 TABLET | Refills: 0 | COMMUNITY
Start: 2023-06-07

## 2023-06-07 RX ORDER — CEFAZOLIN SODIUM 2 G/50ML
2000 SOLUTION INTRAVENOUS ONCE
Status: COMPLETED | OUTPATIENT
Start: 2023-06-07 | End: 2023-06-07

## 2023-06-07 RX ORDER — PROPOFOL 10 MG/ML
INJECTION, EMULSION INTRAVENOUS AS NEEDED
Status: DISCONTINUED | OUTPATIENT
Start: 2023-06-07 | End: 2023-06-07

## 2023-06-07 RX ORDER — PROPOFOL 10 MG/ML
INJECTION, EMULSION INTRAVENOUS CONTINUOUS PRN
Status: DISCONTINUED | OUTPATIENT
Start: 2023-06-07 | End: 2023-06-07

## 2023-06-07 RX ORDER — MIDAZOLAM HYDROCHLORIDE 2 MG/2ML
INJECTION, SOLUTION INTRAMUSCULAR; INTRAVENOUS AS NEEDED
Status: DISCONTINUED | OUTPATIENT
Start: 2023-06-07 | End: 2023-06-07

## 2023-06-07 RX ORDER — ONDANSETRON 2 MG/ML
4 INJECTION INTRAMUSCULAR; INTRAVENOUS ONCE AS NEEDED
Status: DISCONTINUED | OUTPATIENT
Start: 2023-06-07 | End: 2023-06-07 | Stop reason: HOSPADM

## 2023-06-07 RX ORDER — OXYCODONE HYDROCHLORIDE 5 MG/1
5 TABLET ORAL EVERY 4 HOURS PRN
Status: DISCONTINUED | OUTPATIENT
Start: 2023-06-07 | End: 2023-06-07 | Stop reason: HOSPADM

## 2023-06-07 RX ADMIN — SODIUM CHLORIDE, SODIUM LACTATE, POTASSIUM CHLORIDE, AND CALCIUM CHLORIDE 125 ML/HR: .6; .31; .03; .02 INJECTION, SOLUTION INTRAVENOUS at 13:09

## 2023-06-07 RX ADMIN — ONDANSETRON 4 MG: 2 INJECTION INTRAMUSCULAR; INTRAVENOUS at 13:56

## 2023-06-07 RX ADMIN — MIDAZOLAM 2 MG: 1 INJECTION INTRAMUSCULAR; INTRAVENOUS at 13:40

## 2023-06-07 RX ADMIN — FENTANYL CITRATE 100 MCG: 50 INJECTION, SOLUTION INTRAMUSCULAR; INTRAVENOUS at 13:47

## 2023-06-07 RX ADMIN — CEFAZOLIN SODIUM 2000 MG: 2 SOLUTION INTRAVENOUS at 13:40

## 2023-06-07 RX ADMIN — PROPOFOL 50 MG: 10 INJECTION, EMULSION INTRAVENOUS at 13:47

## 2023-06-07 RX ADMIN — PROPOFOL 50 MG: 10 INJECTION, EMULSION INTRAVENOUS at 14:04

## 2023-06-07 RX ADMIN — DEXAMETHASONE SODIUM PHOSPHATE 10 MG: 10 INJECTION, SOLUTION INTRAMUSCULAR; INTRAVENOUS at 13:56

## 2023-06-07 RX ADMIN — PROPOFOL 50 MCG/KG/MIN: 10 INJECTION, EMULSION INTRAVENOUS at 13:47

## 2023-06-07 NOTE — ANESTHESIA PREPROCEDURE EVALUATION
Procedure:  EXCISION  BIOPSY LESION/MASS ABDOMINAL/CHEST WALL (Chest)    Relevant Problems   CARDIO   (+) HTN, goal below 130/80   (+) Mixed hyperlipidemia      ENDO   (+) Type 2 diabetes mellitus with hyperglycemia, without long-term current use of insulin (HCC)      GI/HEPATIC   (+) NAFLD (nonalcoholic fatty liver disease)   GERD (gastroesophageal reflux disease)   Sleep apnea NOn compliant with Cpap  Physical Exam    Airway    Mallampati score: III  TM Distance: >3 FB  Neck ROM: full     Dental   Comment: Discolored,     Cardiovascular      Pulmonary  Breath sounds clear to auscultation,     Other Findings        Anesthesia Plan  ASA Score- 3     Anesthesia Type- IV sedation with anesthesia with ASA Monitors  Additional Monitors:   Airway Plan:           Plan Factors-Exercise tolerance (METS): >4 METS  Chart reviewed  EKG reviewed  Existing labs reviewed  Patient summary reviewed  Patient is not a current smoker  Induction- intravenous  Postoperative Plan-     Informed Consent- Anesthetic plan and risks discussed with patient  I personally reviewed this patient with the CRNA  Discussed and agreed on the Anesthesia Plan with the CRNA  Reina Fried

## 2023-06-07 NOTE — INTERVAL H&P NOTE
H&P reviewed  After examining the patient I find no changes in the patients condition since the H&P had been written      Vitals:    06/07/23 1258   BP: 140/75   Pulse: 66   Resp: 16   Temp: 97 8 °F (36 6 °C)   SpO2: 97%

## 2023-06-07 NOTE — ANESTHESIA POSTPROCEDURE EVALUATION
Post-Op Assessment Note    CV Status:  Stable  Pain Score: 0    Pain management: adequate     Mental Status:  Alert and awake   Hydration Status:  Euvolemic and stable   PONV Controlled:  None   Airway Patency:  Patent      Post Op Vitals Reviewed: Yes      Staff: CRNA         No notable events documented      /65 (06/07/23 1418)    Temp 98 7 °F (37 1 °C) (06/07/23 1418)    Pulse 79 (06/07/23 1418)   Resp 19 (06/07/23 1418)    SpO2 97 % (06/07/23 1418)

## 2023-06-07 NOTE — INTERIM OP NOTE
EXCISION  BIOPSY LESION/MASS ABDOMINAL/CHEST WALL  Postoperative Note  PATIENT NAME: Allyssa Setting  : 1960  MRN: [id number]  [facility] OR ROOM 02    Surgery Date: 2023    Preop Diagnosis:  Mass of chest wall [R22 2]    Post-Op Diagnosis Codes:     * Mass of chest wall [R22 2]    Procedure(s) (LRB):  EXCISION  BIOPSY LESION/MASS ABDOMINAL/CHEST WALL (N/A)    Surgeon(s) and Role:     * Roland Finch MD - Primary     * Jo Kerns PA-C - Assisting    Specimens:  ID Type Source Tests Collected by Time Destination   1 : mass of chest wall Tissue Soft Tissue, Other TISSUE EXAM Roland Finch MD 2023 1403        Estimated Blood Loss:   0 mL    Anesthesia Type:   IV Sedation with Anesthesia     Findings:   Small cyst of the anterior chest, prior excision in the office    Complications:   None      SIGNATURE: Omar Fox PA-C   DATE: 2023   TIME: 2:16 PM

## 2023-06-07 NOTE — DISCHARGE INSTR - AVS FIRST PAGE
[place] Instructions  Dr Bala Sylvester MD, FACS  [phone]    1  General: You will feel pulling sensations around the wound or funny aches and pains around the incisions  This is normal  Even minor surgery is a change in your body and this is your body's way of reacting to it  If you have had abdominal surgery, it may help to support the incision with a small pillow or blanket for comfort when moving or coughing  2  Wound care:  Okay to shower  The glue will fall off over the next week or 2  Use ice for the first 5 days after surgery  Do not use for longer than 20 minutes at a time  Use ice 5 times per day  Suture will be removed in the office    3  Water: You may shower over the wounds  Do not bathe or use a pool or hot tub until cleared by the physician  You can remove the dressing in 72 hours, if it the dressing is coming off prior to 72hours, you may put a bandaid on it  You do not need a dressing after that  4  Activity: You may go up and down stairs, walk as much as you are comfortable, but walk at least 3 times each day  If you have had abdominal surgery, do not lift anything heavier than 15 lbs for the 1st 2 weeks and 25 lbs for weeks 3 and 4      5  Diet: You may resume a regular diet  If you had a same-day surgery or overnight stay surgery, you may wish to eat lightly for a few days: soups, crackers, and sandwiches  You may resume a regular diet when ready  6  Medications: Resume all of your previous medications, unless told otherwise by the doctor  Avoid aspirin products for 2-3 days after the date of surgery  You may, at that time, begin to take them again  Use Tylenol and Ibuprofen for pain control  You may alternate these medications every 3 hours  For example: you may take Tylenol at noon, Ibuprofen at 3:00 p m , and Tylenol again at 6:00 p m , etc   You should use ice to assist with pain control as above    You do not need to take narcotic pain medication unless you are having significant pain  If you were prescribed a narcotic pain medication containing Tylenol, such as Percocet or Norco, do not use supplemental Tylenol  7  Driving: You will need someone to drive you home on the day of surgery or discharge  Do not drive or make any important decisions while on narcotic pain medication or 24 hours and after anesthesia or sedation for surgery  Generally, you may drive when your off all narcotic pain medications and you are comfortable  8  Upset Stomach: You may take Maalox, Tums, or similar items for an upset stomach  If your narcotic pain medication causes an upset stomach, do not take it on an empty stomach  Try taking it with at least some crackers or toast      9  Constipation: Patients often experience constipation after surgery  You may take over-the-counter medication for this, such as Metamucil, Senokot, Dulcolax, milk of magnesia, etc  You may take a suppository unless you have had anorectal surgery such as a procedure on your hemorrhoids  If you experience significant nausea or vomiting after abdominal surgery, call the office before trying any of these medications  10  Call the office: If you are experiencing any of the following: fevers above 101 5°, significant nausea or vomiting, if the wound develops drainage and/or there is excessive redness around the wound, or if you have significant diarrhea or other worsening symptoms  11  Pain: You may be given a prescription for pain medication  This will be sent to your pharmacy prior to discharge  12  Sexual Activity: You may resume sexual activity when you feel ready and comfortable and your incision is sealed and healed without apparent infection risk  13  Urination: If you have not urinated in 6 hours, go directly to the ER for evaluation for urinary retention  14  Follow-up in 2 weeks        ***READ ONLY IF YOU HAVE BEEN DISCHARGED WITH A URINARY CATHETER***  [place] Insertion for Post-Op Urinary Retention    - A prescription for Flomax will be sent to your pharmacy  This should be taken daily while the urinary catheter remains in place  You will not be given a prescription for Flomax if your prostate has been removed  If you are already taking Flomax, continue the medication as prescribed  - We will send a message to the urology group who will contact you within the next 48 hours with further instructions and to schedule an appointment for voiding trial and catheter removal   The urinary catheter will remain in place for approximately 1 week  If you are not contacted within the next 48 hours please call our office to assist with scheduling your follow-up      - If you have your own urologist, you should contact your physician the day after discharge for instructions and to schedule a voiding trial and catheter removal

## 2023-06-08 NOTE — OP NOTE
Sebaceous Cyst Resection Procedure Note    Name: Nell Valladares   : 1960  MRN: [id number]  Date: 2023    Indications: The patient has a changing and/or enlarging soft tissue subcutaneous mass, clinically consistent with a sebaceous cyst  Cyst located chest     Pre-operative Diagnosis: Sebaceous cyst    Post-operative Diagnosis: Sebaceous cyst    Procedure: Resection of Sebaceous cyst     Surgeon: Aurea Donovan MD    Assistants: John Ernandez    Anesthesia: Monitored Local Anesthesia with Sedation      Procedure Details   The patient was seen in the Holding Room  The risks, benefits, complications, treatment options, and expected outcomes were discussed with the patient  The possibilities of reaction to medication, bleeding, infection, the need for additional procedures, failure to diagnose a condition, and creating a complication operation were discussed with the patient  The patient concurred with the proposed plan, giving informed consent  The site of surgery properly noted/marked  The patient was taken to Operating Room, identified as Nell Valladares and staff verified patient name, , procedure, site, and laterality  A Time Out was held and the above information confirmed  The patient was placed lying supine  The trunk was prepped and draped in standard fashion  Local anesthesia was used to anesthetize the skin surrounding a 2 cm lesion  A oblique elliptical incision was made over the lesion  Sharp and blunt dissection,Using scissors, knife, and cautery, were used to mobilize the mass which was in a subcutaneous location  5 mm margins were taken  Skin, soft tissue, and the mass, and surrounding fat were taken  Hemostasis was achieved with cautery  The wound was irrigated  The wound was closed in multiple layers using 3-0 Vicryl suture for subcutaneous tissue and 3-0 Nylon mattress suture  The wound was dressed      The specimen size:2x1 5x1 1cm    At the end of the operation, all sponge, instrument, and needle counts were correct  Findings:  seb cyst of chest    This text is generated with voice recognition software  There may be translation, syntax,  or grammatical errors  If you have any questions, please contact the dictating provider  Estimated Blood Loss:  Minimal                      Specimens: Sebaceous cyst  Order Name Source Comment Collection Info Order Time   TISSUE EXAM Soft Tissue, Other  Collected By: Lela Redmond MD 6/7/2023  2:03 PM     Release to patient through [facility]   Immediate                   Implants: none           Complications:  None; patient tolerated the procedure well             Disposition: PACU            Condition: stable    Attending Attestation: I was present for the entire procedure and qualified resident physician was not available    Signature:   Lela Redmond MD  Date: 6/8/2023 Time: 12:24 PM

## 2023-06-12 ENCOUNTER — TELEPHONE (OUTPATIENT)
Dept: ENDOCRINOLOGY | Facility: HOSPITAL | Age: 63
End: 2023-06-12

## 2023-06-12 NOTE — TELEPHONE ENCOUNTER
Patient left a message  He recently had blood work done for another provider and would like to know if he needs to have anything else done for his appointment with you later this month   If so please call the patient and fax the orders to the [facility] in [facility]

## 2023-06-13 PROCEDURE — 88304 TISSUE EXAM BY PATHOLOGIST: CPT | Performed by: PATHOLOGY

## 2023-06-14 ENCOUNTER — TELEPHONE (OUTPATIENT)
Dept: SURGERY | Facility: HOSPITAL | Age: 63
End: 2023-06-14

## 2023-06-21 NOTE — PROGRESS NOTES
Established Patient Progress Note       Chief Complaint   Patient presents with   • Diabetes Type 2      History of Present Illness:     Cherrie Holman is a 60 y o  male with a history of prediabetes progressed to T2DM in 2020 started on metformin and controlled with lifestyle modifications with no micro or macrovascular complications with other PMHx of NAFLD, hyperlipidemia, vitB-12 def and overweight who presents today for diabetes care  Since last visit- patient had a abdominal cyst surgery and is still recovering  Blood Sugar/Glucometer/Pump/CGM review:  Checks BG every now and then  Reports fasting BG have been elevated in 130-160 ranges and post meal x1 was 201  Did have 1 day of BG in 279, 264 but this was the day of surgery when not allowed to take his metformin  Reports BG have been higher lately  Current regime:- metformin 2000mg XR at night   Medications tried/failed in past:- Above  Hypoglycemic episodes: None  Diet:- Eats chinese food as wife is Damon  Reports could get less carbs  Activity- currently limited d/t surgery  Weight- Stable  last eye exam -10/22- normal   last foot exam -Done 12/22  History of hypertension:- Yes is on losartan/HCTZ 100mg-25mg daily   History of hyperlipidemia:- Yes is on lipitor 20mg daily,   Last lipid 12/22- , HDL 33, LDL 61     Last urine 05/23- normal   Last hBA1C 05/23 6 2%    Patient Active Problem List   Diagnosis   • Type 2 diabetes mellitus with hyperglycemia, without long-term current use of insulin ([facility] [address] )   • HTN, goal below 130/80   • Mixed hyperlipidemia   • Vitamin D deficiency   • NAFLD (nonalcoholic fatty liver disease)   • Fatigue      Past Medical History:   Diagnosis Date   • Colon polyp    • GERD (gastroesophageal reflux disease)    • Hyperlipidemia    • Hypertension    • Sleep apnea       Past Surgical History:   Procedure Laterality Date   • COLONOSCOPY     • CYST REMOVAL      back   • IN EXC B9 LESION MRGN XCP SK TG T/A/L 0 6-1 0 CM N/A 6/7/2023    Procedure: EXCISION  BIOPSY LESION/MASS ABDOMINAL/CHEST WALL;  Surgeon: Stephani Mccauley MD;  Location:  MAIN OR;  Service: General      Family History   Problem Relation Age of Onset   • Diabetes Paternal Grandfather    • Colon polyps Neg Hx    • Colon cancer Neg Hx      Social History     Tobacco Use   • Smoking status: Never   • Smokeless tobacco: Never   Substance Use Topics   • Alcohol use: Yes     Alcohol/week: 1 0 standard drink of alcohol     Types: 1 Cans of beer per week     Comment: socially     No Known Allergies    Current Outpatient Medications:   •  acetaminophen (TYLENOL) 650 mg CR tablet, Take 1 tablet (650 mg total) by mouth every 8 (eight) hours as needed for mild pain or moderate pain, Disp: 30 tablet, Rfl: 0  •  aspirin (ECOTRIN LOW STRENGTH) 81 mg EC tablet, Take by mouth daily 1/2 tablet daily, Disp: , Rfl:   •  atorvastatin (LIPITOR) 20 mg tablet, Take 20 mg by mouth daily, Disp: , Rfl:   •  azithromycin (ZITHROMAX) 250 mg tablet, Take 250 mg by mouth 2 (two) times a day, Disp: , Rfl:   •  Cyanocobalamin (Vitamin B 12) 500 MCG TABS, Take by mouth daily, Disp: , Rfl:   •  ibuprofen (MOTRIN) 800 mg tablet, Take 1 tablet (800 mg total) by mouth every 8 (eight) hours as needed for mild pain, Disp: 30 tablet, Rfl: 0  •  losartan-hydrochlorothiazide (HYZAAR) 100-25 MG per tablet, TAKE 1 TABLET BY MOUTH EVERY DAY FOR 30 DAYS, Disp: , Rfl:   •  metFORMIN (GLUCOPHAGE-XR) 500 mg 24 hr tablet, Take 4 tablets (2,000 mg total) by mouth daily with breakfast, Disp: 480 tablet, Rfl: 1  •  omeprazole (PriLOSEC) 20 mg delayed release capsule, Take 10 mg by mouth daily, Disp: , Rfl:     Review of Systems   Constitutional: Positive for fatigue  Negative for diaphoresis and unexpected weight change  Respiratory: Negative for shortness of breath  Cardiovascular: Negative for chest pain and palpitations  Gastrointestinal: Negative for constipation and diarrhea  Endocrine: Positive for polyuria  Negative for polydipsia and polyphagia  Skin: Negative  Neurological: Positive for light-headedness and numbness  Negative for headaches  Physical Exam:  There is no height or weight on file to calculate BMI  There were no vitals taken for this visit  Wt Readings from Last 3 Encounters:   06/07/23 90 kg (198 lb 6 6 oz)   05/19/23 92 5 kg (204 lb)   04/21/23 91 7 kg (202 lb 3 2 oz)       Physical Exam  Constitutional:       Appearance: Normal appearance  Cardiovascular:      Rate and Rhythm: Normal rate and regular rhythm  Pulses: Normal pulses  no weak pulses          Dorsalis pedis pulses are 2+ on the right side and 2+ on the left side  Pulmonary:      Effort: Pulmonary effort is normal    Abdominal:      General: Abdomen is flat  Palpations: Abdomen is soft  Feet:      Right foot:      Skin integrity: No ulcer, skin breakdown, erythema, warmth, callus or dry skin  Left foot:      Skin integrity: No ulcer, skin breakdown, erythema, warmth, callus or dry skin  Skin:     General: Skin is warm  Capillary Refill: Capillary refill takes less than 2 seconds  Neurological:      General: No focal deficit present  Mental Status: He is alert  Psychiatric:         Mood and Affect: Mood normal        \  Labs:    Latest Reference Range & Units 09/20/21 00:00 06/14/22 10:30 07/11/22 08:22 12/17/22 00:00 05/19/23 09:43 06/07/23 13:05   Hemoglobin A1C Normal 3 8-5 6%; PreDiabetic 5 7-6 4%;  Diabetic >=6 5%; Glycemic control for adults with diabetes <7 0% % 7 1 (E) 6 4 6 6 (H) 6 5 (E) 6 2 (H)    eAG, EST AVG Glucose mg/dl     131    POC Glucose 65 - 140 mg/dl      123   (H): Data is abnormally high  (E): External lab result   Latest Reference Range & Units 07/11/22 08:22 01/26/23 00:00   EXT Creatinine Urine 20 - 320 mg/dL 80 88 (E)   MICROALBUMIN/CREATININE RATIO <30 mcg/mg creat NOTE 3 (E)   MICROALBUM ,U,RANDOM See Note: mg/dL <0 2 0 3 (E) (E): External lab result     Latest Reference Range & Units 07/11/22 08:22   Cholesterol <200 mg/dL 117   Triglycerides <150 mg/dL 118   HDL > OR = 40 mg/dL 37 (L)   Non-HDL Cholesterol <130 mg/dL (calc) 80   LDL Calculated mg/dL (calc) 60   Chol HDLC Ratio <5 0 (calc) 3 2   (L): Data is abnormally low   10/22/21 08:19 10/28/22 00:00   Left Eye Diabetic Retinopathy None (E) None (E)   (E): External lab result   10/22/21 08:19 10/28/22 00:00   Right Eye Diabetic Retinopathy None (E) None (E)   (E): External lab result    Impression & Plan:    Problem List Items Addressed This Visit        Digestive    NAFLD (nonalcoholic fatty liver disease)       Endocrine    Type 2 diabetes mellitus with hyperglycemia, without long-term current use of insulin (HCC) - Primary    Relevant Orders    Comprehensive metabolic panel    Hemoglobin A1C    Albumin / creatinine urine ratio    Lipid panel       Cardiovascular and Mediastinum    HTN, goal below 130/80       Other    Mixed hyperlipidemia    Vitamin D deficiency       Orders Placed This Encounter   Procedures   • Comprehensive metabolic panel     This is a patient instruction: Patient fasting for 8 hours or longer recommended  Standing Status:   Future     Standing Expiration Date:   6/21/2024   • Hemoglobin A1C     Standing Status:   Future     Standing Expiration Date:   6/21/2024   • Albumin / creatinine urine ratio     Standing Status:   Future     Standing Expiration Date:   6/21/2024   • Lipid panel     This is a patient instruction: This test requires patient fasting for 10-12 hours or longer  Drinking of black coffee or black tea is acceptable  Standing Status:   Future     Standing Expiration Date:   6/21/2024       There are no Patient Instructions on file for this visit      61yM with PMHx of T2DM diagnosed in 2020 currently well controlled while on metformin 500mg BID and lifestyle modifications with no microvascular (does have neuropathy but NOT diabetes related >20yrs) or macrovascular complications with other PMHx of NAFLD, hypertension and overweight who presents today to discuss his diabetes care  1) T2DM:- Patient is currently on metformin 2000mg XR daily  His most recent HbA1C has improved to 6 2% with reported BG slightly higher BG fasting and only 1 PPH  Discussed this could be d/t recovery from surgery and also lack of activity  Recommend working on activity and diet to see if BG improve, and starts to remain high and >200, please reach out and we could start him on januvia  Plan   C/w metformin 2000mg XR daily for now   Check BG 1-2x daily, if elevated consider januvia     Screening:-   Retinopathy 10/22- uptodate  Uptodate on lipid and urine microalbumin- repeat next visit  C/w lipitor 20mg daily for now    2) Hypertension:- BP in clinic? c/w losartan 100mg daily   3) Hyperlipidemia, LDL at goal 07/22, repeat in next visit, c/w lipitor 20mg daily    RTC in 3 months     Discussed with the patient and all questioned fully answered  He will call me if any problems arise      Counseled patient on diagnostic results, prognosis, risk and benefit of treatment options, instruction for management, importance of treatment compliance, Risk  factor reduction and impressions      Lionel Jean MD

## 2023-06-22 ENCOUNTER — OFFICE VISIT (OUTPATIENT)
Dept: ENDOCRINOLOGY | Facility: HOSPITAL | Age: 63
End: 2023-06-22
Payer: COMMERCIAL

## 2023-06-22 VITALS
HEART RATE: 76 BPM | DIASTOLIC BLOOD PRESSURE: 78 MMHG | SYSTOLIC BLOOD PRESSURE: 118 MMHG | BODY MASS INDEX: 28.14 KG/M2 | WEIGHT: 201 LBS | HEIGHT: 71 IN

## 2023-06-22 DIAGNOSIS — K76.0 NAFLD (NONALCOHOLIC FATTY LIVER DISEASE): ICD-10-CM

## 2023-06-22 DIAGNOSIS — I10 HTN, GOAL BELOW 130/80: ICD-10-CM

## 2023-06-22 DIAGNOSIS — E55.9 VITAMIN D DEFICIENCY: ICD-10-CM

## 2023-06-22 DIAGNOSIS — E78.2 MIXED HYPERLIPIDEMIA: ICD-10-CM

## 2023-06-22 DIAGNOSIS — E11.65 TYPE 2 DIABETES MELLITUS WITH HYPERGLYCEMIA, WITHOUT LONG-TERM CURRENT USE OF INSULIN (HCC): Primary | ICD-10-CM

## 2023-06-22 PROCEDURE — 99214 OFFICE O/P EST MOD 30 MIN: CPT | Performed by: STUDENT IN AN ORGANIZED HEALTH CARE EDUCATION/TRAINING PROGRAM

## 2023-06-23 ENCOUNTER — OFFICE VISIT (OUTPATIENT)
Dept: SURGERY | Facility: HOSPITAL | Age: 63
End: 2023-06-23

## 2023-06-23 VITALS — BODY MASS INDEX: 27.89 KG/M2 | WEIGHT: 200 LBS | DIASTOLIC BLOOD PRESSURE: 82 MMHG | SYSTOLIC BLOOD PRESSURE: 146 MMHG

## 2023-06-23 DIAGNOSIS — R22.2 MASS OF CHEST WALL: Primary | ICD-10-CM

## 2023-06-23 PROCEDURE — 99024 POSTOP FOLLOW-UP VISIT: CPT | Performed by: PHYSICIAN ASSISTANT

## 2023-06-23 NOTE — PROGRESS NOTES
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, acute inflammation and associated scar  Pathology reviewed with patient  Findings are benign  No further intervention required  Patient does not require further surgical follow-up  He should leave Steri-Strips in place over incision site until they fall off on their own  He should continue to call with changes, questions, or concerns  HPI:  Zonia Elliott is a 60 y o male who comes in today for postoperative check after recent  on excision of chest wall cyst as above  Currently doing well without problems, no fever or chills  No difficulty with incision site  No swelling or drainage  ROS:  General ROS: negative for - chills, fatigue, fever or night sweats, weight loss  Respiratory ROS: no cough, shortness of breath, or wheezing  Cardiovascular ROS: no chest pain or dyspnea on exertion  Abdomen ROS: no pain, N/V  Genito-Urinary ROS: no dysuria, trouble voiding, or hematuria  Musculoskeletal ROS: negative for - gait disturbance, joint pain or muscle pain  Neurological ROS: no TIA or stroke symptoms  Skin ROS: as per HPI    ALLERGIES  Patient has no known allergies      Current Outpatient Medications:   •  acetaminophen (TYLENOL) 650 mg CR tablet, Take 1 tablet (650 mg total) by mouth every 8 (eight) hours as needed for mild pain or moderate pain, Disp: 30 tablet, Rfl: 0  •  aspirin (ECOTRIN LOW STRENGTH) 81 mg EC tablet, Take by mouth daily 1/2 tablet daily, Disp: , Rfl:   •  atorvastatin (LIPITOR) 20 mg tablet, Take 20 mg by mouth daily, Disp: , Rfl:   •  azithromycin (ZITHROMAX) 250 mg tablet, Take 250 mg by mouth 2 (two) times a day, Disp: , Rfl:   •  Cyanocobalamin (Vitamin B 12) 500 MCG TABS, Take by mouth daily, Disp: , Rfl:   •  ibuprofen (MOTRIN) 800 mg tablet, Take 1 tablet (800 mg total) by mouth every 8 (eight) hours as needed for mild pain, Disp: 30 tablet, Rfl: 0  •  losartan-hydrochlorothiazide (HYZAAR) 100-25 MG per tablet, TAKE 1 TABLET BY MOUTH EVERY DAY FOR 30 DAYS, Disp: , Rfl:   •  metFORMIN (GLUCOPHAGE-XR) 500 mg 24 hr tablet, Take 4 tablets (2,000 mg total) by mouth daily with breakfast, Disp: 480 tablet, Rfl: 1  •  omeprazole (PriLOSEC) 20 mg delayed release capsule, Take 10 mg by mouth daily, Disp: , Rfl:   Past Medical History:   Diagnosis Date   • Colon polyp    • GERD (gastroesophageal reflux disease)    • Hyperlipidemia    • Hypertension    • Sleep apnea      Past Surgical History:   Procedure Laterality Date   • COLONOSCOPY     • CYST REMOVAL      back   • NM EXC B9 LESION MRGN XCP SK TG T/A/L 0 6-1 0 CM N/A 6/7/2023    Procedure: EXCISION  BIOPSY LESION/MASS ABDOMINAL/CHEST WALL;  Surgeon: Cisco Benitez MD;  Location:  MAIN OR;  Service: General     Family History   Problem Relation Age of Onset   • Diabetes Paternal Grandfather    • Diabetes unspecified Paternal Grandfather         Type 2   • Colon polyps Neg Hx    • Colon cancer Neg Hx       reports that he has never smoked  He has never used smokeless tobacco  He reports current alcohol use of about 1 0 standard drink of alcohol per week  He reports that he does not use drugs      PHYSICAL EXAM    Vitals:    06/23/23 0903   BP: 146/82       General: normal, cooperative, no distress  Incision: clean, dry, and intact and healing well      Lashonda Bautista

## 2023-09-22 LAB
ALBUMIN SERPL-MCNC: 4.7 G/DL (ref 3.6–5.1)
ALBUMIN/CREAT UR: 9 MCG/MG CREAT
ALBUMIN/GLOB SERPL: 1.8 (CALC) (ref 1–2.5)
ALP SERPL-CCNC: 55 U/L (ref 35–144)
ALT SERPL-CCNC: 44 U/L (ref 9–46)
AST SERPL-CCNC: 28 U/L (ref 10–35)
BILIRUB SERPL-MCNC: 0.9 MG/DL (ref 0.2–1.2)
BUN SERPL-MCNC: 18 MG/DL (ref 7–25)
BUN/CREAT SERPL: ABNORMAL (CALC) (ref 6–22)
CALCIUM SERPL-MCNC: 9.7 MG/DL (ref 8.6–10.3)
CHLORIDE SERPL-SCNC: 97 MMOL/L (ref 98–110)
CHOLEST SERPL-MCNC: 146 MG/DL
CHOLEST/HDLC SERPL: 4.1 (CALC)
CO2 SERPL-SCNC: 32 MMOL/L (ref 20–32)
CREAT SERPL-MCNC: 1.04 MG/DL (ref 0.7–1.35)
CREAT UR-MCNC: 47 MG/DL (ref 20–320)
GFR/BSA.PRED SERPLBLD CYS-BASED-ARV: 80 ML/MIN/1.73M2
GLOBULIN SER CALC-MCNC: 2.6 G/DL (CALC) (ref 1.9–3.7)
GLUCOSE SERPL-MCNC: 147 MG/DL (ref 65–99)
HBA1C MFR BLD: 6.5 % OF TOTAL HGB
HDLC SERPL-MCNC: 36 MG/DL
LDLC SERPL CALC-MCNC: 74 MG/DL (CALC)
MICROALBUMIN UR-MCNC: 0.4 MG/DL
NONHDLC SERPL-MCNC: 110 MG/DL (CALC)
POTASSIUM SERPL-SCNC: 4.2 MMOL/L (ref 3.5–5.3)
PROT SERPL-MCNC: 7.3 G/DL (ref 6.1–8.1)
SODIUM SERPL-SCNC: 137 MMOL/L (ref 135–146)
TRIGL SERPL-MCNC: 272 MG/DL

## 2023-09-28 ENCOUNTER — OFFICE VISIT (OUTPATIENT)
Dept: ENDOCRINOLOGY | Facility: HOSPITAL | Age: 63
End: 2023-09-28
Payer: COMMERCIAL

## 2023-09-28 VITALS
WEIGHT: 200 LBS | HEART RATE: 79 BPM | DIASTOLIC BLOOD PRESSURE: 60 MMHG | BODY MASS INDEX: 28 KG/M2 | SYSTOLIC BLOOD PRESSURE: 120 MMHG | OXYGEN SATURATION: 99 % | HEIGHT: 71 IN

## 2023-09-28 DIAGNOSIS — E78.2 MIXED HYPERLIPIDEMIA: Primary | ICD-10-CM

## 2023-09-28 DIAGNOSIS — E11.65 TYPE 2 DIABETES MELLITUS WITH HYPERGLYCEMIA, WITHOUT LONG-TERM CURRENT USE OF INSULIN (HCC): ICD-10-CM

## 2023-09-28 PROCEDURE — 99214 OFFICE O/P EST MOD 30 MIN: CPT | Performed by: STUDENT IN AN ORGANIZED HEALTH CARE EDUCATION/TRAINING PROGRAM

## 2023-09-28 NOTE — PROGRESS NOTES
Established Patient Progress Note       Chief Complaint   Patient presents with    Diabetes Type 2      History of Present Illness:     Sergio Coronado is a 58 y.o. male with a history of prediabetes progressed to T2DM in 2020 started on metformin and controlled with lifestyle modifications with no micro or macrovascular complications with other PMHx of NAFLD, hyperlipidemia, vitB-12 def and overweight who presents today for diabetes care. Last visit 06/23      Wife has gone to [facility] and since then patient hasn't been eating right. Reports eats out 50% time and rest eats home. When at home mainly cooks ham burger, steak, and spaghetti. Also hasn't been very active d/t working 10 hrs    Blood Sugar/Glucometer/Pump/CGM review:    Avg last 90 days:- 152, lowest 118, highest 174. Checks fasting only   Current regime:- metformin 2000mg XR at night   Medications tried/failed in past:- Above  Hypoglycemic episodes: None  Diet:- Eats chinese food as wife is [facility]. Reports could get less carbs  Activity- See above  Weight- Stable.      last eye exam - 10/22, normal exam. Has annual visit set up  last foot exam -Done 12/22  History of hypertension:- Yes is on losartan/HCTZ 100mg-25mg daily   History of hyperlipidemia:- Yes is on lipitor 20mg daily,   Last lipid 09/23- , LDL 74  Last urine 05/23- normal   Last hBA1C 09/23 6.4%, 05/23 6.2%    Patient Active Problem List   Diagnosis    Type 2 diabetes mellitus with hyperglycemia, without long-term current use of insulin (HCC)    HTN, goal below 130/80    Mixed hyperlipidemia    Vitamin D deficiency    NAFLD (nonalcoholic fatty liver disease)    Fatigue      Past Medical History:   Diagnosis Date    Colon polyp     GERD (gastroesophageal reflux disease)     Hyperlipidemia     Hypertension     Sleep apnea       Past Surgical History:   Procedure Laterality Date    COLONOSCOPY      CYST REMOVAL      back    ND EXC B9 LESION MRGN XCP SK TG T/A/L 0.6-1.0 CM N/A 6/7/2023 Procedure: EXCISION  BIOPSY LESION/MASS ABDOMINAL/CHEST WALL;  Surgeon: Emelyn Goldsmith MD;  Location: [facility];  Service: General      Family History   Problem Relation Age of Onset    Diabetes Paternal Grandfather     Diabetes unspecified Paternal Grandfather         Type 2    Colon polyps Neg Hx     Colon cancer Neg Hx      Social History     Tobacco Use    Smoking status: Never    Smokeless tobacco: Never   Substance Use Topics    Alcohol use: Yes     Alcohol/week: 1.0 standard drink of alcohol     Types: 1 Cans of beer per week     Comment: socially     No Known Allergies    Current Outpatient Medications:     aspirin (ECOTRIN LOW STRENGTH) 81 mg EC tablet, Take by mouth daily 1/2 tablet daily, Disp: , Rfl:     atorvastatin (LIPITOR) 20 mg tablet, Take 20 mg by mouth daily, Disp: , Rfl:     Cyanocobalamin (Vitamin B 12) 500 MCG TABS, Take by mouth daily, Disp: , Rfl:     losartan-hydrochlorothiazide (HYZAAR) 100-25 MG per tablet, TAKE 1 TABLET BY MOUTH EVERY DAY FOR 30 DAYS, Disp: , Rfl:     metFORMIN (GLUCOPHAGE-XR) 500 mg 24 hr tablet, Take 4 tablets (2,000 mg total) by mouth daily with breakfast, Disp: 480 tablet, Rfl: 1    omeprazole (PriLOSEC) 20 mg delayed release capsule, Take 10 mg by mouth daily, Disp: , Rfl:     acetaminophen (TYLENOL) 650 mg CR tablet, Take 1 tablet (650 mg total) by mouth every 8 (eight) hours as needed for mild pain or moderate pain, Disp: 30 tablet, Rfl: 0    azithromycin (ZITHROMAX) 250 mg tablet, Take 250 mg by mouth 2 (two) times a day, Disp: , Rfl:     ibuprofen (MOTRIN) 800 mg tablet, Take 1 tablet (800 mg total) by mouth every 8 (eight) hours as needed for mild pain, Disp: 30 tablet, Rfl: 0    Review of Systems   Constitutional:  Negative for diaphoresis, fatigue and unexpected weight change. Respiratory:  Negative for shortness of breath. Cardiovascular:  Negative for chest pain and palpitations. Gastrointestinal:  Negative for constipation and diarrhea. Endocrine: Negative for polydipsia, polyphagia and polyuria. Skin: Negative. Neurological:  Negative for light-headedness, numbness and headaches. Physical Exam:  Body mass index is 27.89 kg/m². /60   Pulse 79   Ht 5' 11" (1.803 m)   Wt 90.7 kg (200 lb)   SpO2 99%   BMI 27.89 kg/m²    Wt Readings from Last 3 Encounters:   09/28/23 90.7 kg (200 lb)   06/23/23 90.7 kg (200 lb)   06/22/23 91.2 kg (201 lb)       Physical Exam  Constitutional:       Appearance: Normal appearance. Cardiovascular:      Rate and Rhythm: Normal rate and regular rhythm. Pulses: Normal pulses. Dorsalis pedis pulses are 2+ on the right side and 2+ on the left side. Pulmonary:      Effort: Pulmonary effort is normal.   Abdominal:      General: Abdomen is flat. Palpations: Abdomen is soft. Feet:      Right foot:      Skin integrity: No ulcer, skin breakdown, erythema, warmth, callus or dry skin. Left foot:      Skin integrity: No ulcer, skin breakdown, erythema, warmth, callus or dry skin. Skin:     General: Skin is warm. Capillary Refill: Capillary refill takes less than 2 seconds. Neurological:      General: No focal deficit present. Mental Status: He is alert.    Psychiatric:         Mood and Affect: Mood normal.     Labs:      Latest Reference Range & Units 09/22/23 09:40   Sodium 135 - 146 mmol/L 137   Potassium 3.5 - 5.3 mmol/L 4.2   Chloride 98 - 110 mmol/L 97 (L)   CO2 20 - 32 mmol/L 32   BUN 7 - 25 mg/dL 18   Creatinine 0.70 - 1.35 mg/dL 1.04   SL AMB BUN/CREATININE RATIO 6 - 22 (calc) SEE NOTE:   Glucose, Random 65 - 99 mg/dL 147 (H)   Calcium 8.6 - 10.3 mg/dL 9.7   AST 10 - 35 U/L 28   ALT 9 - 46 U/L 44   Alkaline Phosphatase 35 - 144 U/L 55   Total Protein 6.1 - 8.1 g/dL 7.3   Albumin 3.6 - 5.1 g/dL 4.7   TOTAL BILIRUBIN 0.2 - 1.2 mg/dL 0.9   eGFR > OR = 60 mL/min/1.73m2 80   Albumin/Globulin Ratio 1.0 - 2.5 (calc) 1.8   (L): Data is abnormally low  (H): Data is abnormally high   Latest Reference Range & Units 12/17/22 00:00 05/19/23 09:43 09/22/23 09:40   Hemoglobin A1C <5.7 % of total Hgb 6.5 (E) 6.2 (H) 6.5 (H)   (H): Data is abnormally high  (E): External lab resultcccccccc   Latest Reference Range & Units 01/26/23 00:00 09/22/23 09:40   EXT Creatinine Urine 20 - 320 mg/dL 88 (E) 47   MICROALBUMIN/CREATININE RATIO <30 mcg/mg creat 3 (E) 9   MICROALBUM.,U,RANDOM See Note: mg/dL 0.3 (E) 0.4   (E): External lab result   Latest Reference Range & Units 07/11/22 08:22 09/22/23 09:40   Cholesterol <200 mg/dL 117 146   Triglycerides <150 mg/dL 118 272 (H)   HDL > OR = 40 mg/dL 37 (L) 36 (L)   Non-HDL Cholesterol <130 mg/dL (calc) 80 110   LDL Calculated mg/dL (calc) 60 74   Chol HDLC Ratio <5.0 (calc) 3.2 4.1   (H): Data is abnormally high  (L): Data is abnormally low   10/22/21 08:19 10/28/22 00:00   Left Eye Diabetic Retinopathy None (E) None (E)   (E): External lab result   10/22/21 08:19 10/28/22 00:00   Right Eye Diabetic Retinopathy None (E) None (E)   (E): External lab result    Impression & Plan:    Problem List Items Addressed This Visit          Endocrine    Type 2 diabetes mellitus with hyperglycemia, without long-term current use of insulin (HCC)    Relevant Orders    Hemoglobin A1C    Comprehensive metabolic panel    Albumin / creatinine urine ratio    Lipid panel       Other    Mixed hyperlipidemia - Primary    Relevant Orders    Hemoglobin A1C    Comprehensive metabolic panel    Albumin / creatinine urine ratio    Lipid panel     Orders Placed This Encounter   Procedures    Hemoglobin A1C     Standing Status:   Future     Standing Expiration Date:   9/28/2024    Comprehensive metabolic panel     This is a patient instruction: Patient fasting for 8 hours or longer recommended.      Standing Status:   Future     Standing Expiration Date:   9/28/2024    Albumin / creatinine urine ratio     Standing Status:   Future     Standing Expiration Date:   9/28/2024    Lipid panel     This is a patient instruction: This test requires patient fasting for 10-12 hours or longer. Drinking of black coffee or black tea is acceptable. Standing Status:   Future     Standing Expiration Date:   9/28/2024       There are no Patient Instructions on file for this visit. 61yM with PMHx of T2DM diagnosed in 2020 currently well controlled while on metformin 500mg BID and lifestyle modifications with no microvascular (does have neuropathy but NOT diabetes related >20yrs) or macrovascular complications with other PMHx of NAFLD, hypertension and overweight who presents today for his diabetes management. Last visit 06/23    1) T2DM:- Patients HbA1C has remained in acceptable range despite slight raise from 6.2% to 6.5%. BG reported again are acceptable but can improve once he works on diet/exercise as discussed. Diet currently high in carbs and fat which can explain his raise in A1c and also his TG. Recommend working on this. Continue with metformin for now. Given stable control, can follow up in 6 months with labs. If BG above goal, reach out to me sooner. Consider januvia then. Plan   C/w metformin 2000mg XR daily for now   Check BG 1-2x daily, if elevated consider januvia     Screening:-   Retinopathy 10/22- uptodate  Uptodate on lipid and urine microalbumin- repeat next visit  C/w lipitor 20mg daily for now    2) Hypertension:- BP in clinic 120/60 c/w losartan 100mg daily   3) Hyperlipidemia, LDL at goal but TG elevated, educated on low fat diet and making these changes, discussed TG not high enough to consider medications, but needs to work on lifestyle changes c/w lipitor 20mg daily    RTC in 6 months     Discussed with the patient and all questioned fully answered. He will call me if any problems arise.     Counseled patient on diagnostic results, prognosis, risk and benefit of treatment options, instruction for management, importance of treatment compliance, Risk  factor reduction and Segun Winters MD

## 2023-10-13 DIAGNOSIS — E11.65 TYPE 2 DIABETES MELLITUS WITH HYPERGLYCEMIA, WITHOUT LONG-TERM CURRENT USE OF INSULIN (HCC): ICD-10-CM

## 2023-10-13 RX ORDER — METFORMIN HYDROCHLORIDE 500 MG/1
TABLET, EXTENDED RELEASE ORAL
Qty: 480 TABLET | Refills: 1 | Status: SHIPPED | OUTPATIENT
Start: 2023-10-13

## 2024-03-14 ENCOUNTER — APPOINTMENT (OUTPATIENT)
Dept: LAB | Facility: HOSPITAL | Age: 64
End: 2024-03-14
Payer: COMMERCIAL

## 2024-03-14 DIAGNOSIS — K76.0 FATTY METAMORPHOSIS OF LIVER: ICD-10-CM

## 2024-03-14 DIAGNOSIS — N18.6 TYPE 2 DIABETES MELLITUS WITH ESRD (END-STAGE RENAL DISEASE) (HCC): ICD-10-CM

## 2024-03-14 DIAGNOSIS — E78.2 MIXED HYPERLIPIDEMIA: ICD-10-CM

## 2024-03-14 DIAGNOSIS — E11.65 TYPE 2 DIABETES MELLITUS WITH HYPERGLYCEMIA, WITHOUT LONG-TERM CURRENT USE OF INSULIN (HCC): ICD-10-CM

## 2024-03-14 DIAGNOSIS — E11.22 TYPE 2 DIABETES MELLITUS WITH ESRD (END-STAGE RENAL DISEASE) (HCC): ICD-10-CM

## 2024-03-14 LAB
25(OH)D3 SERPL-MCNC: 21.7 NG/ML (ref 30–100)
ALBUMIN SERPL BCP-MCNC: 4.4 G/DL (ref 3.5–5)
ALP SERPL-CCNC: 52 U/L (ref 34–104)
ALT SERPL W P-5'-P-CCNC: 40 U/L (ref 7–52)
ANION GAP SERPL CALCULATED.3IONS-SCNC: 8 MMOL/L (ref 4–13)
AST SERPL W P-5'-P-CCNC: 24 U/L (ref 13–39)
BASOPHILS # BLD AUTO: 0.06 THOUSANDS/ÂΜL (ref 0–0.1)
BASOPHILS NFR BLD AUTO: 1 % (ref 0–1)
BILIRUB SERPL-MCNC: 0.77 MG/DL (ref 0.2–1)
BUN SERPL-MCNC: 17 MG/DL (ref 5–25)
CALCIUM SERPL-MCNC: 9.4 MG/DL (ref 8.4–10.2)
CHLORIDE SERPL-SCNC: 100 MMOL/L (ref 96–108)
CHOLEST SERPL-MCNC: 113 MG/DL
CO2 SERPL-SCNC: 32 MMOL/L (ref 21–32)
CREAT SERPL-MCNC: 1.04 MG/DL (ref 0.6–1.3)
CREAT UR-MCNC: 206.6 MG/DL
EOSINOPHIL # BLD AUTO: 0.21 THOUSAND/ÂΜL (ref 0–0.61)
EOSINOPHIL NFR BLD AUTO: 4 % (ref 0–6)
ERYTHROCYTE [DISTWIDTH] IN BLOOD BY AUTOMATED COUNT: 12 % (ref 11.6–15.1)
EST. AVERAGE GLUCOSE BLD GHB EST-MCNC: 146 MG/DL
FERRITIN SERPL-MCNC: 251 NG/ML (ref 24–336)
GFR SERPL CREATININE-BSD FRML MDRD: 75 ML/MIN/1.73SQ M
GLUCOSE P FAST SERPL-MCNC: 133 MG/DL (ref 65–99)
HBA1C MFR BLD: 6.7 %
HCT VFR BLD AUTO: 43.9 % (ref 36.5–49.3)
HDLC SERPL-MCNC: 34 MG/DL
HGB BLD-MCNC: 14.7 G/DL (ref 12–17)
IMM GRANULOCYTES # BLD AUTO: 0.01 THOUSAND/UL (ref 0–0.2)
IMM GRANULOCYTES NFR BLD AUTO: 0 % (ref 0–2)
IRON SATN MFR SERPL: 34 % (ref 15–50)
IRON SERPL-MCNC: 96 UG/DL (ref 50–212)
LDLC SERPL CALC-MCNC: 50 MG/DL (ref 0–100)
LYMPHOCYTES # BLD AUTO: 1.92 THOUSANDS/ÂΜL (ref 0.6–4.47)
LYMPHOCYTES NFR BLD AUTO: 37 % (ref 14–44)
MCH RBC QN AUTO: 30.8 PG (ref 26.8–34.3)
MCHC RBC AUTO-ENTMCNC: 33.5 G/DL (ref 31.4–37.4)
MCV RBC AUTO: 92 FL (ref 82–98)
MICROALBUMIN UR-MCNC: 8.6 MG/L
MICROALBUMIN/CREAT 24H UR: 4 MG/G CREATININE (ref 0–30)
MONOCYTES # BLD AUTO: 0.37 THOUSAND/ÂΜL (ref 0.17–1.22)
MONOCYTES NFR BLD AUTO: 7 % (ref 4–12)
NEUTROPHILS # BLD AUTO: 2.67 THOUSANDS/ÂΜL (ref 1.85–7.62)
NEUTS SEG NFR BLD AUTO: 51 % (ref 43–75)
NONHDLC SERPL-MCNC: 79 MG/DL
NRBC BLD AUTO-RTO: 0 /100 WBCS
PLATELET # BLD AUTO: 217 THOUSANDS/UL (ref 149–390)
PMV BLD AUTO: 10.9 FL (ref 8.9–12.7)
POTASSIUM SERPL-SCNC: 3.9 MMOL/L (ref 3.5–5.3)
PROT SERPL-MCNC: 6.8 G/DL (ref 6.4–8.4)
RBC # BLD AUTO: 4.77 MILLION/UL (ref 3.88–5.62)
SODIUM SERPL-SCNC: 140 MMOL/L (ref 135–147)
TIBC SERPL-MCNC: 285 UG/DL (ref 250–450)
TRIGL SERPL-MCNC: 143 MG/DL
UIBC SERPL-MCNC: 189 UG/DL (ref 155–355)
VIT B12 SERPL-MCNC: 324 PG/ML (ref 180–914)
WBC # BLD AUTO: 5.24 THOUSAND/UL (ref 4.31–10.16)

## 2024-03-14 PROCEDURE — 82306 VITAMIN D 25 HYDROXY: CPT

## 2024-03-14 PROCEDURE — 80061 LIPID PANEL: CPT

## 2024-03-14 PROCEDURE — 83036 HEMOGLOBIN GLYCOSYLATED A1C: CPT

## 2024-03-14 PROCEDURE — 82607 VITAMIN B-12: CPT

## 2024-03-14 PROCEDURE — 85025 COMPLETE CBC W/AUTO DIFF WBC: CPT

## 2024-03-14 PROCEDURE — 36415 COLL VENOUS BLD VENIPUNCTURE: CPT

## 2024-03-14 PROCEDURE — 82043 UR ALBUMIN QUANTITATIVE: CPT

## 2024-03-14 PROCEDURE — 82570 ASSAY OF URINE CREATININE: CPT

## 2024-03-14 PROCEDURE — 80053 COMPREHEN METABOLIC PANEL: CPT

## 2024-03-14 PROCEDURE — 83550 IRON BINDING TEST: CPT

## 2024-03-14 PROCEDURE — 84153 ASSAY OF PSA TOTAL: CPT

## 2024-03-14 PROCEDURE — 82728 ASSAY OF FERRITIN: CPT

## 2024-03-14 PROCEDURE — 83540 ASSAY OF IRON: CPT

## 2024-03-15 LAB — MISCELLANEOUS LAB TEST RESULT: NORMAL

## 2024-04-04 ENCOUNTER — OFFICE VISIT (OUTPATIENT)
Dept: ENDOCRINOLOGY | Facility: HOSPITAL | Age: 64
End: 2024-04-04
Payer: COMMERCIAL

## 2024-04-04 VITALS
DIASTOLIC BLOOD PRESSURE: 80 MMHG | SYSTOLIC BLOOD PRESSURE: 130 MMHG | BODY MASS INDEX: 27.55 KG/M2 | HEART RATE: 67 BPM | HEIGHT: 71 IN | WEIGHT: 196.8 LBS

## 2024-04-04 DIAGNOSIS — E78.2 MIXED HYPERLIPIDEMIA: ICD-10-CM

## 2024-04-04 DIAGNOSIS — E11.40 TYPE 2 DIABETES MELLITUS WITH DIABETIC NEUROPATHY, WITH LONG-TERM CURRENT USE OF INSULIN (HCC): ICD-10-CM

## 2024-04-04 DIAGNOSIS — E11.65 TYPE 2 DIABETES MELLITUS WITH HYPERGLYCEMIA, WITHOUT LONG-TERM CURRENT USE OF INSULIN (HCC): Primary | ICD-10-CM

## 2024-04-04 DIAGNOSIS — Z79.4 TYPE 2 DIABETES MELLITUS WITH DIABETIC NEUROPATHY, WITH LONG-TERM CURRENT USE OF INSULIN (HCC): ICD-10-CM

## 2024-04-04 DIAGNOSIS — I10 HTN, GOAL BELOW 130/80: ICD-10-CM

## 2024-04-04 PROCEDURE — 99214 OFFICE O/P EST MOD 30 MIN: CPT | Performed by: STUDENT IN AN ORGANIZED HEALTH CARE EDUCATION/TRAINING PROGRAM

## 2024-04-04 RX ORDER — MULTIVIT-MIN/IRON/FOLIC ACID/K 18-600-40
2000 CAPSULE ORAL DAILY
COMMUNITY

## 2024-04-04 NOTE — PROGRESS NOTES
Established Patient Progress Note       Chief Complaint   Patient presents with    Diabetes Type 2      History of Present Illness:     Alexis Dennison is a 63 y.o. male with a history of prediabetes progressed to T2DM in 2020 started on metformin and controlled with lifestyle modifications with no micro or macrovascular complications with other PMHx of NAFLD, hyperlipidemia, vitB-12 def and overweight who presents today for diabetes care. Last visit 09/23     Blood Sugar/Glucometer/Pump/CGM review:  checks fasting only and reports BG avg 150's   Current regime:- metformin 2000mg XR at night   Medications tried/failed in past:- Above  Hypoglycemic episodes: None  Diet:- reports still eats chinese food as wife is chinese. But feels he doesn't eat poorly  Activity- reports can be more active now, 1 day when he worked out his BG was 90 and feels his BG can get better with more exercise   Weight- Stable.     last eye exam - 10/22, normal exam. Has annual visit set up  last foot exam - 04/24  History of hypertension:- Yes is on losartan/HCTZ 100mg-25mg daily   History of hyperlipidemia:- Yes is on lipitor 20mg daily,   Last lipid 03/24 , LDL 50. 09/23- , LDL 74  Last urine 03/24- normal   Last hBA1C 03/24 6.7%, 09/23 6.4%, 05/23 6.2%    Patient Active Problem List   Diagnosis    Type 2 diabetes mellitus with hyperglycemia, without long-term current use of insulin (HCC)    HTN, goal below 130/80    Mixed hyperlipidemia    Vitamin D deficiency    NAFLD (nonalcoholic fatty liver disease)    Fatigue    Type 2 diabetes mellitus with diabetic neuropathy, with long-term current use of insulin (HCC)      Past Medical History:   Diagnosis Date    Colon polyp     GERD (gastroesophageal reflux disease)     Hyperlipidemia     Hypertension     Sleep apnea       Past Surgical History:   Procedure Laterality Date    COLONOSCOPY      CYST REMOVAL      back    NC EXC B9 LESION MRGN XCP SK TG T/A/L 0.6-1.0 CM N/A 6/7/2023     Procedure: EXCISION  BIOPSY LESION/MASS ABDOMINAL/CHEST WALL;  Surgeon: Trevor Villavicencio MD;  Location:  MAIN OR;  Service: General      Family History   Problem Relation Age of Onset    Diabetes Paternal Grandfather     Diabetes unspecified Paternal Grandfather         Type 2    Colon polyps Neg Hx     Colon cancer Neg Hx      Social History     Tobacco Use    Smoking status: Never    Smokeless tobacco: Never   Substance Use Topics    Alcohol use: Yes     Alcohol/week: 1.0 standard drink of alcohol     Types: 1 Cans of beer per week     Comment: socially     No Known Allergies    Current Outpatient Medications:     acetaminophen (TYLENOL) 650 mg CR tablet, Take 1 tablet (650 mg total) by mouth every 8 (eight) hours as needed for mild pain or moderate pain, Disp: 30 tablet, Rfl: 0    aspirin (ECOTRIN LOW STRENGTH) 81 mg EC tablet, Take by mouth daily 1/2 tablet daily, Disp: , Rfl:     atorvastatin (LIPITOR) 20 mg tablet, Take 20 mg by mouth daily, Disp: , Rfl:     azithromycin (ZITHROMAX) 250 mg tablet, Take 250 mg by mouth 2 (two) times a day, Disp: , Rfl:     Cyanocobalamin (Vitamin B 12) 500 MCG TABS, Take by mouth daily, Disp: , Rfl:     ibuprofen (MOTRIN) 800 mg tablet, Take 1 tablet (800 mg total) by mouth every 8 (eight) hours as needed for mild pain, Disp: 30 tablet, Rfl: 0    losartan-hydrochlorothiazide (HYZAAR) 100-25 MG per tablet, TAKE 1 TABLET BY MOUTH EVERY DAY FOR 30 DAYS, Disp: , Rfl:     metFORMIN (GLUCOPHAGE-XR) 500 mg 24 hr tablet, TAKE 4 TABLETS BY MOUTH DAILY WITH BREAKFAST, Disp: 480 tablet, Rfl: 1    omeprazole (PriLOSEC) 20 mg delayed release capsule, Take 10 mg by mouth daily, Disp: , Rfl:     Review of Systems   Constitutional:  Negative for diaphoresis, fatigue and unexpected weight change.   Respiratory:  Negative for shortness of breath.    Cardiovascular:  Negative for chest pain and palpitations.   Gastrointestinal:  Negative for constipation and diarrhea.   Endocrine: Negative for  polydipsia, polyphagia and polyuria.   Skin: Negative.    Neurological:  Negative for light-headedness, numbness and headaches.       Physical Exam:  There is no height or weight on file to calculate BMI.  There were no vitals taken for this visit.   Wt Readings from Last 3 Encounters:   09/28/23 90.7 kg (200 lb)   06/23/23 90.7 kg (200 lb)   06/22/23 91.2 kg (201 lb)       Physical Exam  Constitutional:       Appearance: Normal appearance.   Cardiovascular:      Rate and Rhythm: Normal rate and regular rhythm.      Pulses: Normal pulses. no weak pulses.           Dorsalis pedis pulses are 2+ on the right side and 2+ on the left side.   Pulmonary:      Effort: Pulmonary effort is normal.   Abdominal:      General: Abdomen is flat.      Palpations: Abdomen is soft.   Feet:      Right foot:      Skin integrity: No ulcer, skin breakdown, erythema, warmth, callus or dry skin.      Left foot:      Skin integrity: No ulcer, skin breakdown, erythema, warmth, callus or dry skin.   Skin:     General: Skin is warm.      Capillary Refill: Capillary refill takes less than 2 seconds.   Neurological:      General: No focal deficit present.      Mental Status: He is alert.   Psychiatric:         Mood and Affect: Mood normal.       Patient's shoes and socks removed.    Right Foot/Ankle   Right Foot Inspection  Skin Exam: skin normal and skin intact. No dry skin, no warmth, no callus, no erythema, no maceration, no abnormal color, no pre-ulcer, no ulcer and no callus.     Toe Exam: ROM and strength within normal limits.     Sensory   Vibration: intact  Monofilament testing: intact    Vascular  Capillary refills: < 3 seconds  The right DP pulse is 2+.     Left Foot/Ankle  Left Foot Inspection  Skin Exam: skin normal and skin intact. No dry skin, no warmth, no erythema, no maceration, normal color, no pre-ulcer, no ulcer and no callus.     Toe Exam: ROM and strength within normal limits.     Sensory   Vibration: intact  Monofilament  testing: intact    Vascular  Capillary refills: < 3 seconds  The left DP pulse is 2+.     Assign Risk Category  No deformity present  No loss of protective sensation  No weak pulses  Risk: 0     Labs:    Latest Reference Range & Units 12/17/22 00:00 05/19/23 09:43 06/07/23 13:05 09/22/23 09:40 03/14/24 09:19   Hemoglobin A1C Normal 4.0-5.6%; PreDiabetic 5.7-6.4%; Diabetic >=6.5%; Glycemic control for adults with diabetes <7.0% % 6.5 (E) 6.2 (H)  6.5 (H) 6.7 (H)   eAG, EST AVG Glucose mg/dl  131   146   POC Glucose 65 - 140 mg/dl   123     (H): Data is abnormally high  (E): External lab result   Latest Reference Range & Units 09/22/23 09:40 03/14/24 09:19   EXT Creatinine Urine Reference range not established. mg/dL 47 206.6   Albumin Creat Ratio 0 - 30 mg/g creatinine 9 4   Albumin,U,Random <20.0 mg/L 0.4 8.6      Latest Reference Range & Units 09/22/23 09:40 03/14/24 09:19   Cholesterol See Comment mg/dL 146 113   Triglycerides See Comment mg/dL 272 (H) 143   HDL >=40 mg/dL 36 (L) 34 (L)   Non-HDL Cholesterol mg/dl 110 79   LDL Calculated 0 - 100 mg/dL 74 50   Chol/HDL Ratio <5.0 (calc) 4.1    (H): Data is abnormally high  (L): Data is abnormally low   10/22/21 08:19 10/28/22 00:00   Left Eye Diabetic Retinopathy None (E) None (E)   (E): External lab result   10/22/21 08:19 10/28/22 00:00   Right Eye Diabetic Retinopathy None (E) None (E)   (E): External lab result    Impression & Plan:    Problem List Items Addressed This Visit          Cardiovascular and Mediastinum    HTN, goal below 130/80    Relevant Orders    Comprehensive metabolic panel    Albumin / creatinine urine ratio    Lipid panel    Hemoglobin A1C       Endocrine    Type 2 diabetes mellitus with diabetic neuropathy, with long-term current use of insulin (HCC)    Type 2 diabetes mellitus with hyperglycemia, without long-term current use of insulin (HCC) - Primary    Relevant Orders    Comprehensive metabolic panel    Albumin / creatinine urine ratio     Lipid panel    Hemoglobin A1C       Other    Mixed hyperlipidemia    Relevant Orders    Comprehensive metabolic panel    Albumin / creatinine urine ratio    Lipid panel    Hemoglobin A1C       Orders Placed This Encounter   Procedures    Comprehensive metabolic panel     This is a patient instruction: Patient fasting for 8 hours or longer recommended.     Standing Status:   Future     Standing Expiration Date:   4/4/2025    Albumin / creatinine urine ratio     Standing Status:   Future     Standing Expiration Date:   4/4/2025    Lipid panel     This is a patient instruction: This test requires patient fasting for 10-12 hours or longer. Drinking of black coffee or black tea is acceptable.     Standing Status:   Future     Standing Expiration Date:   4/4/2025    Hemoglobin A1C     Standing Status:   Future     Standing Expiration Date:   4/4/2025       There are no Patient Instructions on file for this visit.    62yM with PMHx of T2DM diagnosed in 2020 currently well controlled while on metformin  and lifestyle modifications with no microvascular (does have neuropathy but NOT diabetes related >20yrs) or macrovascular complications with other PMHx of NAFLD, hypertension and overweight who presents today for his diabetes management. Last visit 09/23    1) T2DM:- Patients HbA1C has increased from 6.4% which was prediabetic to 6.7% now which is in diabetic range now. Certainly his previous poor diet may have been a contributing factor and also lack of exercise but discussed given raise in glycemia now would highly recommend considering additional agent to help with with. He would like to work on improving his exercise for the next few months and if BG still above goal consider additional medications. Ok with this     Plan   C/w metformin 2000mg XR daily for now   Check BG 1-2x daily- please check a few PP as well and not just fasting     Screening:-   Retinopathy- needs annual screening, overdue  Uptodate on lipid and  urine microalbumin- repeat next visit  C/w lipitor 20mg daily for now    2) Hypertension:- BP in clinic  c/w losartan 100mg daily   3) Hyperlipidemia, LDL and TG at goal, c/w lipitor 20mg daily    RTC in 3 months since now A1C in diabetic range     Discussed with the patient and all questioned fully answered. He will call me if any problems arise.    Counseled patient on diagnostic results, prognosis, risk and benefit of treatment options, instruction for management, importance of treatment compliance, Risk  factor reduction and impressions      Jackie Mcgee MD

## 2024-05-08 DIAGNOSIS — E11.65 TYPE 2 DIABETES MELLITUS WITH HYPERGLYCEMIA, WITHOUT LONG-TERM CURRENT USE OF INSULIN (HCC): ICD-10-CM

## 2024-05-08 RX ORDER — METFORMIN HYDROCHLORIDE 500 MG/1
TABLET, EXTENDED RELEASE ORAL
Qty: 360 TABLET | Refills: 1 | Status: SHIPPED | OUTPATIENT
Start: 2024-05-08

## 2024-05-22 NOTE — PROGRESS NOTES
Assessment/Plan:   Osiel Snow is a 60 y o male who is here for follow-up of right chest cyst   Patient was seen for infection of this cyst on April 14, 2023  He required an office incision and drainage procedure and antibiotic treatment  Patient recovered well and area is now closed, however he returns to discuss surgical removal of cyst cavity to prevent further infection  Patient is diabetic  Right chest cyst  -s/p I&D 4/14/2023 and antibiotic treatment for infection of cyst cavity  -Patient found to have underlying abscess with ruptured cyst fragments at time of I&D  Area was difficult to anesthetize due to infection and entire cyst cavity was unable to be removed at that time  -Will schedule for excision of chest wall cyst cavity to be done in the operating room due to size of cyst cavity approximately 3 cm and location of cyst   Made the difficult to close incision site in this area  Seizure discussed in detail with the patient as well as possible risks and complications and written consent has been obtained  All questions answered  We will obtain insurance authorization   -Patient will require sedation anesthesia  Will need preadmission blood work and EKG prior to proceeding  DM2  -We will check hemoglobin A1c  -Structured to continue tight blood sugar control for optimal wound healing postoperatively        HPI:  Osiel Snow is a 60 y o male who returns for follow-up of right chest cyst   Patient was seen for infection of this cyst on April 14, 2023  He required an office incision and drainage procedure and antibiotic treatment  Patient recovered well and area is now closed, however he returns to discuss surgical removal of cyst cavity to prevent further infection  Patient is diabetic  Patient denies current pain at the site  States that cyst has been present for many years  Occasionally notes swelling and tenderness at site    Treated for previous infection as well as I&D procedure as above  Patient would like to proceed with excision of cyst to prevent further episodes of infection and swelling  Patient is diabetic  Hemoglobin A1c was 6 6 in July 2022     ROS:  General ROS: negative  negative for - chills, fatigue, fever or night sweats, weight loss  Respiratory ROS: no cough, shortness of breath, or wheezing  Cardiovascular ROS: no chest pain or dyspnea on exertion  Abdomen ROS: no pain , N/V  Genito-Urinary ROS: no dysuria, trouble voiding, or hematuria  Musculoskeletal ROS: negative for - gait disturbance, joint pain or muscle pain  Neurological ROS: no TIA or stroke symptoms  Skin ROS: See HPI    ALLERGIES  Amlodipine besy-benazepril hcl    Current Outpatient Medications:   •  aspirin (ECOTRIN LOW STRENGTH) 81 mg EC tablet, Take by mouth daily 1/2 tablet daily, Disp: , Rfl:   •  atorvastatin (LIPITOR) 20 mg tablet, Take 20 mg by mouth daily, Disp: , Rfl:   •  Cyanocobalamin (Vitamin B 12) 500 MCG TABS, Take by mouth daily, Disp: , Rfl:   •  losartan-hydrochlorothiazide (HYZAAR) 100-25 MG per tablet, TAKE 1 TABLET BY MOUTH EVERY DAY FOR 30 DAYS, Disp: , Rfl:   •  metFORMIN (GLUCOPHAGE-XR) 500 mg 24 hr tablet, Take 4 tablets (2,000 mg total) by mouth daily with breakfast, Disp: 480 tablet, Rfl: 1  •  omeprazole (PriLOSEC) 20 mg delayed release capsule, Take 10 mg by mouth daily, Disp: , Rfl:   Past Medical History:   Diagnosis Date   • Colon polyp    • GERD (gastroesophageal reflux disease)    • Hyperlipidemia    • Hypertension      Past Surgical History:   Procedure Laterality Date   • COLONOSCOPY       Family History   Problem Relation Age of Onset   • Diabetes Paternal Grandfather    • Colon polyps Neg Hx    • Colon cancer Neg Hx       reports that he has never smoked  He has never used smokeless tobacco  He reports current alcohol use  He reports that he does not use drugs      PHYSICAL EXAM    Vitals:    05/19/23 0859   BP: 129/78   Pulse: 63   Resp: 16   Temp: (!) 96 6 °F (35 9 °C) Weight (last 2 days)     Date/Time Weight    05/19/23 0859 92 5 (204)          General Appearance:    Alert, cooperative, no distress   Head:    Normocephalic without obvious abnormality   Eyes:    Conjunctiva/corneas clear, EOM's intact        Neck:   Supple, no adenopathy, no JVD   Back:     Symmetric, no spinal or CVA tenderness   Lungs:     Clear to auscultation bilaterally, no wheezing or rhonchi   Heart:    Regular rate and rhythm, S1 and S2 normal, no murmur   Abdomen:     Benign, no rebound or guarding  Extremities:   Extremities normal  No clubbing, cyanosis or edema   Psych:   Normal Affect, AOx3  Neurologic:  Skin:   CNII-XII intact  Strength symmetric, speech intact    Warm, dry, intact  Large approximately 3 cm raised area with palpable underlying lump consistent with cyst cavity in mid chest, nontender, no surrounding erythema or signs of infection             Verenice Scarce [address]

## 2024-06-07 ENCOUNTER — HOSPITAL ENCOUNTER (OUTPATIENT)
Dept: RADIOLOGY | Facility: HOSPITAL | Age: 64
End: 2024-06-07
Payer: COMMERCIAL

## 2024-06-07 DIAGNOSIS — M72.2 PLANTAR FASCIITIS, RIGHT: ICD-10-CM

## 2024-06-07 PROCEDURE — 73630 X-RAY EXAM OF FOOT: CPT

## 2024-08-09 ENCOUNTER — APPOINTMENT (OUTPATIENT)
Dept: LAB | Facility: HOSPITAL | Age: 64
End: 2024-08-09
Payer: COMMERCIAL

## 2024-08-09 DIAGNOSIS — E11.65 TYPE 2 DIABETES MELLITUS WITH HYPERGLYCEMIA, WITHOUT LONG-TERM CURRENT USE OF INSULIN (HCC): ICD-10-CM

## 2024-08-09 DIAGNOSIS — E78.2 MIXED HYPERLIPIDEMIA: ICD-10-CM

## 2024-08-09 DIAGNOSIS — I10 HTN, GOAL BELOW 130/80: ICD-10-CM

## 2024-08-09 LAB
ALBUMIN SERPL BCG-MCNC: 4.2 G/DL (ref 3.5–5)
ALP SERPL-CCNC: 49 U/L (ref 34–104)
ALT SERPL W P-5'-P-CCNC: 29 U/L (ref 7–52)
ANION GAP SERPL CALCULATED.3IONS-SCNC: 9 MMOL/L (ref 4–13)
AST SERPL W P-5'-P-CCNC: 18 U/L (ref 13–39)
BILIRUB SERPL-MCNC: 1.04 MG/DL (ref 0.2–1)
BUN SERPL-MCNC: 20 MG/DL (ref 5–25)
CALCIUM SERPL-MCNC: 9.6 MG/DL (ref 8.4–10.2)
CHLORIDE SERPL-SCNC: 99 MMOL/L (ref 96–108)
CHOLEST SERPL-MCNC: 120 MG/DL
CO2 SERPL-SCNC: 32 MMOL/L (ref 21–32)
CREAT SERPL-MCNC: 1.03 MG/DL (ref 0.6–1.3)
CREAT UR-MCNC: 191.5 MG/DL
EST. AVERAGE GLUCOSE BLD GHB EST-MCNC: 140 MG/DL
GFR SERPL CREATININE-BSD FRML MDRD: 75 ML/MIN/1.73SQ M
GLUCOSE P FAST SERPL-MCNC: 111 MG/DL (ref 65–99)
HBA1C MFR BLD: 6.5 %
HDLC SERPL-MCNC: 41 MG/DL
LDLC SERPL CALC-MCNC: 59 MG/DL (ref 0–100)
MICROALBUMIN UR-MCNC: 10.8 MG/L
MICROALBUMIN/CREAT 24H UR: 6 MG/G CREATININE (ref 0–30)
NONHDLC SERPL-MCNC: 79 MG/DL
POTASSIUM SERPL-SCNC: 3.8 MMOL/L (ref 3.5–5.3)
PROT SERPL-MCNC: 6.9 G/DL (ref 6.4–8.4)
SODIUM SERPL-SCNC: 140 MMOL/L (ref 135–147)
TRIGL SERPL-MCNC: 99 MG/DL

## 2024-08-09 PROCEDURE — 36415 COLL VENOUS BLD VENIPUNCTURE: CPT

## 2024-08-09 PROCEDURE — 82043 UR ALBUMIN QUANTITATIVE: CPT

## 2024-08-09 PROCEDURE — 80061 LIPID PANEL: CPT

## 2024-08-09 PROCEDURE — 83036 HEMOGLOBIN GLYCOSYLATED A1C: CPT

## 2024-08-09 PROCEDURE — 82570 ASSAY OF URINE CREATININE: CPT

## 2024-08-09 PROCEDURE — 80053 COMPREHEN METABOLIC PANEL: CPT

## 2024-08-23 ENCOUNTER — OFFICE VISIT (OUTPATIENT)
Dept: ENDOCRINOLOGY | Facility: HOSPITAL | Age: 64
End: 2024-08-23
Payer: COMMERCIAL

## 2024-08-23 VITALS
DIASTOLIC BLOOD PRESSURE: 70 MMHG | HEART RATE: 72 BPM | BODY MASS INDEX: 26.43 KG/M2 | SYSTOLIC BLOOD PRESSURE: 124 MMHG | WEIGHT: 188.8 LBS | OXYGEN SATURATION: 96 % | HEIGHT: 71 IN

## 2024-08-23 DIAGNOSIS — I10 HTN, GOAL BELOW 130/80: ICD-10-CM

## 2024-08-23 DIAGNOSIS — E11.65 TYPE 2 DIABETES MELLITUS WITH HYPERGLYCEMIA, WITHOUT LONG-TERM CURRENT USE OF INSULIN (HCC): Primary | ICD-10-CM

## 2024-08-23 DIAGNOSIS — E78.2 MIXED HYPERLIPIDEMIA: ICD-10-CM

## 2024-08-23 PROCEDURE — 99214 OFFICE O/P EST MOD 30 MIN: CPT | Performed by: PHYSICIAN ASSISTANT

## 2024-08-23 RX ORDER — SITAGLIPTIN AND METFORMIN HYDROCHLORIDE 1000; 50 MG/1; MG/1
2 TABLET, FILM COATED, EXTENDED RELEASE ORAL DAILY
Qty: 180 TABLET | Refills: 3 | Status: SHIPPED | OUTPATIENT
Start: 2024-08-23

## 2024-08-23 NOTE — PROGRESS NOTES
Alexis Dennison 64 y.o. male MRN: [id number]    Encounter: [id number]      Assessment & Plan     Assessment:  This is a 64 y.o.-year-old male with type 2 diabetes with hypertension and hyperlipidemia.    Plan:  1.  Type 2 diabetes:    2.  Hypertension:    3.  Hyperlipidemia:    CC: Type 2 diabetes follow-up    History of Present Illness     HPI:  Alexis Dennison is a 64 y.o. year old male with type 2 diabetes diagnosed in 2020.  He is on oral agents at home and takes metformin 2000 mg daily. He denies any polyuria, polydipsia, nocturia and blurry vision.  He denies neuropathy, nephropathy, retinopathy, heart attack, stroke, and claudication.  States that he eats a relatively healthy diabetic diet.  Does admit that he eats a lot of chinese as his wife is Chinese.  Has been trying to increase his activity, but did have some early complications as he developed plantar fasciitis which has been slowly improving.  Has increased the amount of walking he has done.    Since last office visit he was seen by his PCP.  They gave him a sample of Janumet 1000-50 mg which he was taking 1 tablet daily along with 1000 mg of metformin.  States he noticed significant improvement in fasting glucose levels, would like to be started on Janumet.  Does have some concerns with dysuria which he is currently following up with his PCP for.  Also been having some bilateral calf pain typically at night.    Hypoglycemic episodes: No.     The patient's last eye exam was in October 2022.  The patient's last foot exam was in April 2024.    Blood Sugar/Glucometer/Pump/CGM review: No blood sugar logs present at today's office visit.  States with metformin alone testing glucose levels are typically around 130-140.  With the Janumet has noticed fasting blood sugars around 110 and 120.    For hypertension he is currently on losartan/HCTZ 100-25 mg daily.  For hyperlipidemia he is currently on atorvastatin 20 mg daily.  Denies any headaches, vision changes,  chest pain, MI or strokelike symptoms.     Review of Systems   Constitutional:  Negative for activity change, appetite change, fatigue and unexpected weight change.   HENT:  Negative for trouble swallowing.    Eyes:  Negative for visual disturbance.   Respiratory:  Negative for chest tightness and shortness of breath.    Cardiovascular:  Negative for chest pain, palpitations and leg swelling.   Gastrointestinal:  Negative for abdominal pain, diarrhea, nausea and vomiting.   Endocrine: Negative for cold intolerance, heat intolerance, polydipsia, polyphagia and polyuria.   Genitourinary:  Negative for frequency.   Skin:  Negative for rash and wound.   Neurological:  Negative for dizziness, weakness, light-headedness, numbness and headaches.   Psychiatric/Behavioral:  Negative for dysphoric mood and sleep disturbance. The patient is not nervous/anxious.        Historical Information   Past Medical History:   Diagnosis Date   • Colon polyp    • GERD (gastroesophageal reflux disease)    • Hyperlipidemia    • Hypertension    • Sleep apnea      Past Surgical History:   Procedure Laterality Date   • COLONOSCOPY     • CYST REMOVAL      back   • UT EXC B9 LESION MRGN XCP SK TG T/A/L 0.6-1.0 CM N/A 6/7/2023    Procedure: EXCISION  BIOPSY LESION/MASS ABDOMINAL/CHEST WALL;  Surgeon: Trevor Villavicencio MD;  Location:  MAIN OR;  Service: General     Social History   Social History     Substance and Sexual Activity   Alcohol Use Yes   • Alcohol/week: 1.0 standard drink of alcohol   • Types: 1 Cans of beer per week    Comment: socially     Social History     Substance and Sexual Activity   Drug Use Never     Social History     Tobacco Use   Smoking Status Never   Smokeless Tobacco Never     Family History:   Family History   Problem Relation Age of Onset   • Diabetes Paternal Grandfather    • Diabetes unspecified Paternal Grandfather         Type 2   • Colon polyps Neg Hx    • Colon cancer Neg Hx        Meds/Allergies   Current  "Outpatient Medications   Medication Sig Dispense Refill   • aspirin (ECOTRIN LOW STRENGTH) 81 mg EC tablet Take by mouth daily 1/2 tablet daily     • atorvastatin (LIPITOR) 20 mg tablet Take 20 mg by mouth daily     • Cholecalciferol (Vitamin D) 50 MCG (2000 UT) CAPS Take 2,000 Units by mouth in the morning     • Cyanocobalamin (Vitamin B 12) 500 MCG TABS Take by mouth daily     • losartan-hydrochlorothiazide (HYZAAR) 100-25 MG per tablet TAKE 1 TABLET BY MOUTH EVERY DAY FOR 30 DAYS     • omeprazole (PriLOSEC) 20 mg delayed release capsule Take 10 mg by mouth daily     • SITagliptin-metFORMIN HCl ER (Janumet XR)  MG TB24 Take 2 tablets by mouth in the morning 180 tablet 3     No current facility-administered medications for this visit.     No Known Allergies    Objective   Vitals: Blood pressure 124/70, pulse 72, height 5' 11\" (1.803 m), weight 85.6 kg (188 lb 12.8 oz), SpO2 96%.    Physical Exam  Vitals and nursing note reviewed.   Constitutional:       General: He is not in acute distress.     Appearance: Normal appearance. He is not diaphoretic.   HENT:      Head: Normocephalic and atraumatic.   Eyes:      General: No scleral icterus.     Extraocular Movements: Extraocular movements intact.      Conjunctiva/sclera: Conjunctivae normal.      Pupils: Pupils are equal, round, and reactive to light.   Neck:      Thyroid: No thyroid mass, thyromegaly or thyroid tenderness.   Cardiovascular:      Rate and Rhythm: Normal rate and regular rhythm.      Heart sounds: No murmur heard.  Pulmonary:      Effort: Pulmonary effort is normal. No respiratory distress.      Breath sounds: Normal breath sounds. No wheezing.   Musculoskeletal:      Right lower leg: No edema.      Left lower leg: No edema.   Lymphadenopathy:      Cervical: No cervical adenopathy.   Skin:     General: Skin is warm and dry.   Neurological:      Mental Status: He is alert and oriented to person, place, and time. Mental status is at baseline.      " Sensory: No sensory deficit.      Gait: Gait normal.   Psychiatric:         Mood and Affect: Mood normal.         Behavior: Behavior normal.         Thought Content: Thought content normal.         The history was obtained from the review of the chart, patient.    Lab Results:   Lab Results   Component Value Date/Time    Hemoglobin A1C 6.5 (H) 08/09/2024 08:15 AM    Hemoglobin A1C 6.7 (H) 03/14/2024 09:19 AM    Hemoglobin A1C 6.5 (H) 09/22/2023 09:40 AM    WBC 5.24 03/14/2024 09:19 AM    Hemoglobin 14.7 03/14/2024 09:19 AM    Hematocrit 43.9 03/14/2024 09:19 AM    MCV 92 03/14/2024 09:19 AM    Platelets 217 03/14/2024 09:19 AM    BUN 20 08/09/2024 08:15 AM    BUN 17 03/14/2024 09:19 AM    BUN 18 09/22/2023 09:40 AM    Potassium 3.8 08/09/2024 08:15 AM    Potassium 3.9 03/14/2024 09:19 AM    Potassium 4.2 09/22/2023 09:40 AM    Chloride 99 08/09/2024 08:15 AM    Chloride 100 03/14/2024 09:19 AM    Chloride 97 (L) 09/22/2023 09:40 AM    CO2 32 08/09/2024 08:15 AM    CO2 32 03/14/2024 09:19 AM    CO2 32 09/22/2023 09:40 AM    Creatinine 1.03 08/09/2024 08:15 AM    Creatinine 1.04 03/14/2024 09:19 AM    Creatinine 1.04 09/22/2023 09:40 AM    AST 18 08/09/2024 08:15 AM    AST 24 03/14/2024 09:19 AM    AST 28 09/22/2023 09:40 AM    ALT 29 08/09/2024 08:15 AM    ALT 40 03/14/2024 09:19 AM    ALT 44 09/22/2023 09:40 AM    Total Protein 6.9 08/09/2024 08:15 AM    Total Protein 6.8 03/14/2024 09:19 AM    Protein, Total 7.3 09/22/2023 09:40 AM    Albumin 4.2 08/09/2024 08:15 AM    Albumin 4.4 03/14/2024 09:19 AM    Albumin 4.7 09/22/2023 09:40 AM    Globulin 2.6 09/22/2023 09:40 AM    HDL 36 (L) 09/22/2023 09:40 AM    HDL, Direct 41 08/09/2024 08:15 AM    HDL, Direct 34 (L) 03/14/2024 09:19 AM    Triglycerides 99 08/09/2024 08:15 AM    Triglycerides 143 03/14/2024 09:19 AM    Triglycerides 272 (H) 09/22/2023 09:40 AM         Portions of the record may have been created with voice recognition software. Occasional wrong word or  "\"sound a like\" substitutions may have occurred due to the inherent limitations of voice recognition software. Read the chart carefully and recognize, using context, where substitutions have occurred.    "

## 2024-08-23 NOTE — PATIENT INSTRUCTIONS
Continue with lifestyle modifications to help improve glucose levels.    We will switch to Janumet at this time.    Recommend checking blood sugars at least daily.    Contact the office with any concerns or questions.    Follow-up in 3 months with lab work prior to visit.

## 2024-10-04 ENCOUNTER — APPOINTMENT (OUTPATIENT)
Dept: LAB | Facility: HOSPITAL | Age: 64
End: 2024-10-04
Payer: COMMERCIAL

## 2024-10-04 DIAGNOSIS — K76.0 FATTY METAMORPHOSIS OF LIVER: ICD-10-CM

## 2024-10-04 DIAGNOSIS — E78.2 MIXED HYPERLIPIDEMIA: ICD-10-CM

## 2024-10-04 DIAGNOSIS — E11.22 TYPE 2 DIABETES MELLITUS WITH ESRD (END-STAGE RENAL DISEASE) (HCC): ICD-10-CM

## 2024-10-04 DIAGNOSIS — N18.6 TYPE 2 DIABETES MELLITUS WITH ESRD (END-STAGE RENAL DISEASE) (HCC): ICD-10-CM

## 2024-10-04 PROCEDURE — 84153 ASSAY OF PSA TOTAL: CPT

## 2024-10-04 PROCEDURE — 36415 COLL VENOUS BLD VENIPUNCTURE: CPT

## 2024-10-09 LAB — MISCELLANEOUS LAB TEST RESULT: NORMAL

## 2024-10-10 NOTE — TELEPHONE ENCOUNTER
Problem: PAIN - ADULT  Goal: Verbalizes/displays adequate comfort level or baseline comfort level  Description: Interventions:  - Encourage patient to monitor pain and request assistance  - Assess pain using appropriate pain scale  - Administer analgesics based on type and severity of pain and evaluate response  - Implement non-pharmacological measures as appropriate and evaluate response  - Consider cultural and social influences on pain and pain management  - Notify physician/advanced practitioner if interventions unsuccessful or patient reports new pain  Outcome: Progressing     Problem: INFECTION - ADULT  Goal: Absence or prevention of progression during hospitalization  Description: INTERVENTIONS:  - Assess and monitor for signs and symptoms of infection  - Monitor lab/diagnostic results  - Monitor all insertion sites, i.e. indwelling lines, tubes, and drains  - Monitor endotracheal if appropriate and nasal secretions for changes in amount and color  - [place] appropriate cooling/warming therapies per order  - Administer medications as ordered  - Instruct and encourage patient and family to use good hand hygiene technique  - Identify and instruct in appropriate isolation precautions for identified infection/condition  Outcome: Progressing     Problem: SAFETY ADULT  Goal: Patient will remain free of falls  Description: INTERVENTIONS:  - Educate patient/family on patient safety including physical limitations  - Instruct patient to call for assistance with activity   - Consult OT/PT to assist with strengthening/mobility   - Keep Call bell within reach  - Keep bed low and locked with side rails adjusted as appropriate  - Keep care items and personal belongings within reach  - Initiate and maintain comfort rounds  - Make Fall Risk Sign visible to staff  - Offer Toileting every 2 Hours, in advance of need  - Initiate/Maintain bed/chair alarm  - Obtain necessary fall risk management equipment: alarms, nonskid  Left detailed message for patient footwear, needs in reach  - Apply yellow socks and bracelet for high fall risk patients  - Consider moving patient to room near nurses station  Outcome: Progressing     Problem: DISCHARGE PLANNING  Goal: Discharge to home or other facility with appropriate resources  Description: INTERVENTIONS:  - Identify barriers to discharge w/patient and caregiver  - Arrange for needed discharge resources and transportation as appropriate  - Identify discharge learning needs (meds, wound care, etc.)  - Arrange for interpretive services to assist at discharge as needed  - Refer to Case Management Department for coordinating discharge planning if the patient needs post-hospital services based on physician/advanced practitioner order or complex needs related to functional status, cognitive ability, or social support system  Outcome: Progressing     Problem: Knowledge Deficit  Goal: Patient/family/caregiver demonstrates understanding of disease process, treatment plan, medications, and discharge instructions  Description: Complete learning assessment and assess knowledge base.  Interventions:  - Provide teaching at level of understanding  - Provide teaching via preferred learning methods  Outcome: Progressing     Problem: METABOLIC, FLUID AND ELECTROLYTES - ADULT  Goal: Electrolytes maintained within normal limits  Description: INTERVENTIONS:  - Monitor labs and assess patient for signs and symptoms of electrolyte imbalances  - Administer electrolyte replacement as ordered  - Monitor response to electrolyte replacements, including repeat lab results as appropriate  - Instruct patient on fluid and nutrition as appropriate  Outcome: Progressing  Goal: Fluid balance maintained  Description: INTERVENTIONS:  - Monitor labs   - Monitor I/O and WT  - Instruct patient on fluid and nutrition as appropriate  - Assess for signs & symptoms of volume excess or deficit  Outcome: Progressing     Problem: CARDIOVASCULAR - ADULT  Goal: Maintains  optimal cardiac output and hemodynamic stability  Description: INTERVENTIONS:  - Monitor I/O, vital signs and rhythm  - Monitor for S/S and trends of decreased cardiac output  - Administer and titrate ordered vasoactive medications to optimize hemodynamic stability  - Assess quality of pulses, skin color and temperature  - Assess for signs of decreased coronary artery perfusion  - Instruct patient to report change in severity of symptoms  Outcome: Progressing     Problem: RESPIRATORY - ADULT  Goal: Achieves optimal ventilation and oxygenation  Description: INTERVENTIONS:  - Assess for changes in respiratory status  - Assess for changes in mentation and behavior  - Position to facilitate oxygenation and minimize respiratory effort  - Oxygen administered by appropriate delivery if ordered  - Initiate smoking cessation education as indicated  - Encourage broncho-pulmonary hygiene including cough, deep breathe, Incentive Spirometry  - Assess the need for suctioning and aspirate as needed  - Assess and instruct to report SOB or any respiratory difficulty  - Respiratory Therapy support as indicated  Outcome: Progressing

## 2024-11-04 ENCOUNTER — TELEPHONE (OUTPATIENT)
Dept: ENDOCRINOLOGY | Facility: HOSPITAL | Age: 64
End: 2024-11-04

## 2024-11-04 NOTE — TELEPHONE ENCOUNTER
I called the patient back and he stated that he would like to discuss the change in medication with you.  He stated that he is going for lab work next week and he thought you were going to see how he was doing on the Janumet and Metformin and then tell him to what do from there.  I did make him aware that you did discuss this at his visit per your note from August.

## 2024-11-05 NOTE — TELEPHONE ENCOUNTER
I called and left a detailed message for the patient concerning the provider's response and he is to call the office if there are any other issues.

## 2024-11-09 ENCOUNTER — APPOINTMENT (OUTPATIENT)
Dept: LAB | Facility: HOSPITAL | Age: 64
End: 2024-11-09
Payer: COMMERCIAL

## 2024-11-09 DIAGNOSIS — E11.65 TYPE 2 DIABETES MELLITUS WITH HYPERGLYCEMIA, WITHOUT LONG-TERM CURRENT USE OF INSULIN (HCC): ICD-10-CM

## 2024-11-09 LAB
ALBUMIN SERPL BCG-MCNC: 4.5 G/DL (ref 3.5–5)
ALP SERPL-CCNC: 52 U/L (ref 34–104)
ALT SERPL W P-5'-P-CCNC: 36 U/L (ref 7–52)
ANION GAP SERPL CALCULATED.3IONS-SCNC: 6 MMOL/L (ref 4–13)
AST SERPL W P-5'-P-CCNC: 23 U/L (ref 13–39)
BILIRUB SERPL-MCNC: 0.98 MG/DL (ref 0.2–1)
BUN SERPL-MCNC: 14 MG/DL (ref 5–25)
CALCIUM SERPL-MCNC: 9.5 MG/DL (ref 8.4–10.2)
CHLORIDE SERPL-SCNC: 98 MMOL/L (ref 96–108)
CO2 SERPL-SCNC: 35 MMOL/L (ref 21–32)
CREAT SERPL-MCNC: 1.02 MG/DL (ref 0.6–1.3)
EST. AVERAGE GLUCOSE BLD GHB EST-MCNC: 134 MG/DL
GFR SERPL CREATININE-BSD FRML MDRD: 76 ML/MIN/1.73SQ M
GLUCOSE P FAST SERPL-MCNC: 120 MG/DL (ref 65–99)
HBA1C MFR BLD: 6.3 %
POTASSIUM SERPL-SCNC: 4 MMOL/L (ref 3.5–5.3)
PROT SERPL-MCNC: 7.1 G/DL (ref 6.4–8.4)
SODIUM SERPL-SCNC: 139 MMOL/L (ref 135–147)

## 2024-11-09 PROCEDURE — 83036 HEMOGLOBIN GLYCOSYLATED A1C: CPT

## 2024-11-09 PROCEDURE — 36415 COLL VENOUS BLD VENIPUNCTURE: CPT

## 2024-11-09 PROCEDURE — 80053 COMPREHEN METABOLIC PANEL: CPT

## 2024-11-13 ENCOUNTER — RESULTS FOLLOW-UP (OUTPATIENT)
Dept: ENDOCRINOLOGY | Facility: HOSPITAL | Age: 64
End: 2024-11-13

## 2024-12-03 DIAGNOSIS — E11.65 TYPE 2 DIABETES MELLITUS WITH HYPERGLYCEMIA, WITHOUT LONG-TERM CURRENT USE OF INSULIN (HCC): ICD-10-CM

## 2024-12-04 RX ORDER — SITAGLIPTIN AND METFORMIN HYDROCHLORIDE 1000; 50 MG/1; MG/1
2 TABLET, FILM COATED, EXTENDED RELEASE ORAL DAILY
Qty: 180 TABLET | Refills: 1 | Status: SHIPPED | OUTPATIENT
Start: 2024-12-04 | End: 2024-12-12

## 2024-12-12 ENCOUNTER — OFFICE VISIT (OUTPATIENT)
Dept: ENDOCRINOLOGY | Facility: HOSPITAL | Age: 64
End: 2024-12-12
Payer: COMMERCIAL

## 2024-12-12 VITALS
WEIGHT: 196.8 LBS | HEIGHT: 71 IN | DIASTOLIC BLOOD PRESSURE: 70 MMHG | BODY MASS INDEX: 27.55 KG/M2 | HEART RATE: 72 BPM | SYSTOLIC BLOOD PRESSURE: 130 MMHG

## 2024-12-12 DIAGNOSIS — I10 HTN, GOAL BELOW 130/80: ICD-10-CM

## 2024-12-12 DIAGNOSIS — E11.65 TYPE 2 DIABETES MELLITUS WITH HYPERGLYCEMIA, WITHOUT LONG-TERM CURRENT USE OF INSULIN (HCC): Primary | ICD-10-CM

## 2024-12-12 DIAGNOSIS — E78.2 MIXED HYPERLIPIDEMIA: ICD-10-CM

## 2024-12-12 PROCEDURE — 99214 OFFICE O/P EST MOD 30 MIN: CPT | Performed by: PHYSICIAN ASSISTANT

## 2024-12-12 RX ORDER — SITAGLIPTIN AND METFORMIN HYDROCHLORIDE 1000; 50 MG/1; MG/1
1 TABLET, FILM COATED, EXTENDED RELEASE ORAL DAILY
Qty: 90 TABLET | Refills: 2 | Status: SHIPPED | OUTPATIENT
Start: 2024-12-12

## 2024-12-12 RX ORDER — METFORMIN HYDROCHLORIDE 500 MG/1
1000 TABLET, EXTENDED RELEASE ORAL EVERY 24 HOURS
COMMUNITY

## 2024-12-12 NOTE — PATIENT INSTRUCTIONS
Continue with lifestyle modifications to help improve glucose levels.     Continue with Janumet and metformin.     Recommend checking blood sugars at least daily.     Contact the office with any concerns or questions.     Follow-up in 3 months with lab work prior to visit.

## 2024-12-12 NOTE — PROGRESS NOTES
Alexis Dennison 64 y.o. male MRN: [id number]    Encounter: [id number]      Assessment & Plan     Assessment:  This is a 64 y.o.-year-old male with type 2 diabetes with hypertension and hyperlipidemia.    Plan:  1.  Type 2 diabetes: Recent hemoglobin A1c came back at 6.3.  There was a little confusion after last office visit as he was supposed to transition from metformin to Janumet.  Is currently taking 1 Janumet daily and 1 metformin daily.  At this point that is fine.  Continue working on lifestyle modifications to help improve glucose levels.  Check glucose levels at least daily.  Contact the office with any concerns or questions.  Will have him follow-up in 3 months with labwork completed prior to visit.    2.  Hypertension: Normotensive in the office today.  Kidney function remained stable with normal electrolytes.  At this time he will continue with losartan-HCTZ 100-25 mg daily.  Repeat CMP prior to next office visit.    3.  Hyperlipidemia: Recent lipid panel was excellent.  At this time he will continue with atorvastatin 20 mg daily.  Will continue to monitor over time.    CC: Type 2 diabetes follow-up    History of Present Illness     HPI:  Alexis Dennison is a 64 y.o. year old male with type 2 diabetes diagnosed in 2020.  He is on oral agents at home and takes metformin 1000 mg daily and Janumet XR  mg daily.  At last office visit he was recommended to switch from metformin to just Janumet, but he had concerns about taking too much medication and is taking 1 metformin daily and 1 Janumet daily.  He denies any polyuria, polydipsia, nocturia and blurry vision.  He denies neuropathy, nephropathy, retinopathy, heart attack, stroke, and claudication.  States that he eats a relatively healthy diabetic diet.  Does admit that he eats a lot of chinese as his wife is Chinese.  Does remain physically active with his main form of activity being walking.     Hypoglycemic episodes: No.      The patient's last eye exam  was in October 2022.  The patient's last foot exam was in April 2024.     Blood Sugar/Glucometer/Pump/CGM review: Fortunately he did not bring in blood sugar logs into the office today.  States that based on what he ate the night before fasting blood sugars can be anywhere between 130 and 150.    For hypertension he is currently on losartan/HCTZ 100-25 mg daily.  For hyperlipidemia he is currently on atorvastatin 20 mg daily.  Denies any headaches, vision changes, chest pain, MI or strokelike symptoms.     Review of Systems   Constitutional:  Negative for activity change, appetite change, fatigue and unexpected weight change.   HENT:  Negative for trouble swallowing.    Eyes:  Negative for visual disturbance.   Respiratory:  Negative for chest tightness and shortness of breath.    Cardiovascular:  Negative for chest pain, palpitations and leg swelling.   Gastrointestinal:  Negative for abdominal pain, diarrhea, nausea and vomiting.   Endocrine: Negative for cold intolerance, heat intolerance, polydipsia, polyphagia and polyuria.   Genitourinary:  Negative for frequency.   Skin:  Negative for rash and wound.   Neurological:  Negative for dizziness, weakness, light-headedness, numbness and headaches.   Psychiatric/Behavioral:  Negative for dysphoric mood and sleep disturbance. The patient is not nervous/anxious.        Historical Information   Past Medical History:   Diagnosis Date   • Colon polyp    • GERD (gastroesophageal reflux disease)    • Hyperlipidemia    • Hypertension    • Sleep apnea      Past Surgical History:   Procedure Laterality Date   • COLONOSCOPY     • CYST REMOVAL      back   • WA EXC B9 LESION MRGN XCP SK TG T/A/L 0.6-1.0 CM N/A 6/7/2023    Procedure: EXCISION  BIOPSY LESION/MASS ABDOMINAL/CHEST WALL;  Surgeon: Trevor Villavicencio MD;  Location:  [facility] OR;  Service: General     Social History   Social History     Substance and Sexual Activity   Alcohol Use Yes   • Alcohol/week: 1.0 standard drink  "of alcohol   • Types: 1 Cans of beer per week    Comment: socially     Social History     Substance and Sexual Activity   Drug Use Never     Social History     Tobacco Use   Smoking Status Never   Smokeless Tobacco Never     Family History:   Family History   Problem Relation Age of Onset   • Diabetes Paternal Grandfather    • Diabetes unspecified Paternal Grandfather         Type 2   • Colon polyps Neg Hx    • Colon cancer Neg Hx        Meds/Allergies   Current Outpatient Medications   Medication Sig Dispense Refill   • aspirin (ECOTRIN LOW STRENGTH) 81 mg EC tablet Take by mouth daily 1/2 tablet daily     • atorvastatin (LIPITOR) 20 mg tablet Take 20 mg by mouth daily     • Cholecalciferol (Vitamin D) 50 MCG (2000 UT) CAPS Take 2,000 Units by mouth in the morning     • Cyanocobalamin (Vitamin B 12) 500 MCG TABS Take by mouth daily     • losartan-hydrochlorothiazide (HYZAAR) 100-25 MG per tablet TAKE 1 TABLET BY MOUTH EVERY DAY FOR 30 DAYS     • metFORMIN (GLUCOPHAGE-XR) 500 mg 24 hr tablet Take 1,000 mg by mouth every 24 hours     • omeprazole (PriLOSEC) 20 mg delayed release capsule Take 10 mg by mouth daily     • SITagliptin-metFORMIN HCl ER (Janumet XR)  MG TB24 Take 1 tablet by mouth in the morning 90 tablet 2     No current facility-administered medications for this visit.     No Known Allergies    Objective   Vitals: Blood pressure 130/70, pulse 72, height 5' 11\" (1.803 m), weight 89.3 kg (196 lb 12.8 oz).    Physical Exam  Vitals and nursing note reviewed.   Constitutional:       General: He is not in acute distress.     Appearance: Normal appearance. He is not diaphoretic.   HENT:      Head: Normocephalic and atraumatic.   Eyes:      General: No scleral icterus.     Extraocular Movements: Extraocular movements intact.      Conjunctiva/sclera: Conjunctivae normal.      Pupils: Pupils are equal, round, and reactive to light.   Cardiovascular:      Rate and Rhythm: Normal rate and regular rhythm.      " Heart sounds: No murmur heard.  Pulmonary:      Effort: Pulmonary effort is normal. No respiratory distress.      Breath sounds: Normal breath sounds. No wheezing.   Musculoskeletal:      Cervical back: Normal range of motion.      Right lower leg: No edema.      Left lower leg: No edema.   Lymphadenopathy:      Cervical: No cervical adenopathy.   Skin:     General: Skin is warm and dry.   Neurological:      Mental Status: He is alert and oriented to person, place, and time. Mental status is at baseline.      Sensory: No sensory deficit.      Gait: Gait normal.   Psychiatric:         Mood and Affect: Mood normal.         Behavior: Behavior normal.         Thought Content: Thought content normal.         The history was obtained from the review of the chart, patient.    Lab Results:   Lab Results   Component Value Date/Time    Hemoglobin A1C 6.3 (H) 11/09/2024 08:12 AM    Hemoglobin A1C 6.5 (H) 08/09/2024 08:15 AM    Hemoglobin A1C 6.7 (H) 03/14/2024 09:19 AM    WBC 5.24 03/14/2024 09:19 AM    Hemoglobin 14.7 03/14/2024 09:19 AM    Hematocrit 43.9 03/14/2024 09:19 AM    MCV 92 03/14/2024 09:19 AM    Platelets 217 03/14/2024 09:19 AM    BUN 14 11/09/2024 08:12 AM    BUN 20 08/09/2024 08:15 AM    BUN 17 03/14/2024 09:19 AM    Potassium 4.0 11/09/2024 08:12 AM    Potassium 3.8 08/09/2024 08:15 AM    Potassium 3.9 03/14/2024 09:19 AM    Chloride 98 11/09/2024 08:12 AM    Chloride 99 08/09/2024 08:15 AM    Chloride 100 03/14/2024 09:19 AM    CO2 35 (H) 11/09/2024 08:12 AM    CO2 32 08/09/2024 08:15 AM    CO2 32 03/14/2024 09:19 AM    Creatinine 1.02 11/09/2024 08:12 AM    Creatinine 1.03 08/09/2024 08:15 AM    Creatinine 1.04 03/14/2024 09:19 AM    AST 23 11/09/2024 08:12 AM    AST 18 08/09/2024 08:15 AM    AST 24 03/14/2024 09:19 AM    ALT 36 11/09/2024 08:12 AM    ALT 29 08/09/2024 08:15 AM    ALT 40 03/14/2024 09:19 AM    Total Protein 7.1 11/09/2024 08:12 AM    Total Protein 6.9 08/09/2024 08:15 AM    Total Protein  "6.8 03/14/2024 09:19 AM    Albumin 4.5 11/09/2024 08:12 AM    Albumin 4.2 08/09/2024 08:15 AM    Albumin 4.4 03/14/2024 09:19 AM    HDL, Direct 41 08/09/2024 08:15 AM    HDL, Direct 34 (L) 03/14/2024 09:19 AM    Triglycerides 99 08/09/2024 08:15 AM    Triglycerides 143 03/14/2024 09:19 AM           Imaging Studies: Results Review Statement: No pertinent imaging studies reviewed.    Portions of the record may have been created with voice recognition software. Occasional wrong word or \"sound a like\" substitutions may have occurred due to the inherent limitations of voice recognition software. Read the chart carefully and recognize, using context, where substitutions have occurred.    "

## 2025-02-09 DIAGNOSIS — E11.65 TYPE 2 DIABETES MELLITUS WITH HYPERGLYCEMIA, WITHOUT LONG-TERM CURRENT USE OF INSULIN (HCC): Primary | ICD-10-CM

## 2025-02-11 RX ORDER — METFORMIN HYDROCHLORIDE 500 MG/1
1000 TABLET, EXTENDED RELEASE ORAL DAILY
Qty: 180 TABLET | Refills: 3 | Status: SHIPPED | OUTPATIENT
Start: 2025-02-11

## 2025-02-22 ENCOUNTER — APPOINTMENT (OUTPATIENT)
Dept: LAB | Facility: HOSPITAL | Age: 65
End: 2025-02-22
Payer: COMMERCIAL

## 2025-02-22 DIAGNOSIS — K21.9 GERD WITHOUT ESOPHAGITIS: ICD-10-CM

## 2025-02-22 DIAGNOSIS — E11.65 TYPE 2 DIABETES MELLITUS WITH HYPERGLYCEMIA, WITHOUT LONG-TERM CURRENT USE OF INSULIN (HCC): ICD-10-CM

## 2025-02-22 DIAGNOSIS — R55 PRE-SYNCOPE: ICD-10-CM

## 2025-02-22 DIAGNOSIS — E11.43 TYPE 2 DIABETES MELLITUS WITH AUTONOMIC NEUROPATHY, UNSPECIFIED WHETHER LONG TERM INSULIN USE (HCC): ICD-10-CM

## 2025-02-22 DIAGNOSIS — E55.9 VITAMIN D DEFICIENCY: ICD-10-CM

## 2025-02-22 DIAGNOSIS — K76.0 NAFLD (NONALCOHOLIC FATTY LIVER DISEASE): ICD-10-CM

## 2025-02-22 DIAGNOSIS — E11.22 TYPE 2 DIABETES MELLITUS WITH DIABETIC CHRONIC KIDNEY DISEASE, UNSPECIFIED CKD STAGE, UNSPECIFIED WHETHER LONG TERM INSULIN USE (HCC): ICD-10-CM

## 2025-02-22 DIAGNOSIS — E53.8 VITAMIN B 12 DEFICIENCY: ICD-10-CM

## 2025-02-22 DIAGNOSIS — E11.69 TYPE 2 DIABETES MELLITUS WITH OTHER SPECIFIED COMPLICATION, UNSPECIFIED WHETHER LONG TERM INSULIN USE (HCC): ICD-10-CM

## 2025-02-22 DIAGNOSIS — E78.2 MIXED HYPERLIPIDEMIA: ICD-10-CM

## 2025-02-22 DIAGNOSIS — Z12.5 PROSTATE CANCER SCREENING: ICD-10-CM

## 2025-02-22 DIAGNOSIS — E11.40 TYPE 2 DIABETES MELLITUS WITH DIABETIC NEUROPATHY, UNSPECIFIED WHETHER LONG TERM INSULIN USE (HCC): ICD-10-CM

## 2025-02-22 LAB
ALBUMIN SERPL BCG-MCNC: 4.5 G/DL (ref 3.5–5)
ALP SERPL-CCNC: 54 U/L (ref 34–104)
ALT SERPL W P-5'-P-CCNC: 35 U/L (ref 7–52)
ANION GAP SERPL CALCULATED.3IONS-SCNC: 7 MMOL/L (ref 4–13)
AST SERPL W P-5'-P-CCNC: 19 U/L (ref 13–39)
BILIRUB SERPL-MCNC: 1.04 MG/DL (ref 0.2–1)
BUN SERPL-MCNC: 16 MG/DL (ref 5–25)
CALCIUM SERPL-MCNC: 9.7 MG/DL (ref 8.4–10.2)
CHLORIDE SERPL-SCNC: 101 MMOL/L (ref 96–108)
CHOLEST SERPL-MCNC: 112 MG/DL (ref ?–200)
CO2 SERPL-SCNC: 32 MMOL/L (ref 21–32)
CREAT SERPL-MCNC: 1.08 MG/DL (ref 0.6–1.3)
CREAT UR-MCNC: 168.6 MG/DL
EST. AVERAGE GLUCOSE BLD GHB EST-MCNC: 143 MG/DL
GFR SERPL CREATININE-BSD FRML MDRD: 71 ML/MIN/1.73SQ M
GLUCOSE P FAST SERPL-MCNC: 131 MG/DL (ref 65–99)
HBA1C MFR BLD: 6.6 %
HDLC SERPL-MCNC: 36 MG/DL
LDLC SERPL CALC-MCNC: 57 MG/DL (ref 0–100)
MICROALBUMIN UR-MCNC: <7 MG/L
NONHDLC SERPL-MCNC: 76 MG/DL
POTASSIUM SERPL-SCNC: 4.6 MMOL/L (ref 3.5–5.3)
PROT SERPL-MCNC: 7.1 G/DL (ref 6.4–8.4)
PSA SERPL-MCNC: 1.38 NG/ML (ref 0–4)
SODIUM SERPL-SCNC: 140 MMOL/L (ref 135–147)
TRIGL SERPL-MCNC: 95 MG/DL (ref ?–150)

## 2025-02-22 PROCEDURE — 82043 UR ALBUMIN QUANTITATIVE: CPT

## 2025-02-22 PROCEDURE — 83036 HEMOGLOBIN GLYCOSYLATED A1C: CPT

## 2025-02-22 PROCEDURE — 80053 COMPREHEN METABOLIC PANEL: CPT

## 2025-02-22 PROCEDURE — 36415 COLL VENOUS BLD VENIPUNCTURE: CPT

## 2025-02-22 PROCEDURE — G0103 PSA SCREENING: HCPCS

## 2025-02-22 PROCEDURE — 82570 ASSAY OF URINE CREATININE: CPT

## 2025-02-22 PROCEDURE — 80061 LIPID PANEL: CPT

## 2025-02-24 ENCOUNTER — RESULTS FOLLOW-UP (OUTPATIENT)
Dept: ENDOCRINOLOGY | Facility: HOSPITAL | Age: 65
End: 2025-02-24

## 2025-03-14 ENCOUNTER — HOSPITAL ENCOUNTER (OUTPATIENT)
Dept: NON INVASIVE DIAGNOSTICS | Facility: HOSPITAL | Age: 65
Discharge: HOME | End: 2025-03-14
Payer: COMMERCIAL

## 2025-03-14 DIAGNOSIS — Z79.4 TYPE 2 DIABETES MELLITUS WITH DIABETIC NEUROPATHY, WITH LONG-TERM CURRENT USE OF INSULIN (HCC): ICD-10-CM

## 2025-03-14 DIAGNOSIS — E11.40 TYPE 2 DIABETES MELLITUS WITH DIABETIC NEUROPATHY, WITH LONG-TERM CURRENT USE OF INSULIN (HCC): ICD-10-CM

## 2025-03-14 DIAGNOSIS — R55 POSTURAL DIZZINESS WITH PRESYNCOPE: ICD-10-CM

## 2025-03-14 DIAGNOSIS — R42 POSTURAL DIZZINESS WITH PRESYNCOPE: ICD-10-CM

## 2025-03-14 PROCEDURE — 93880 EXTRACRANIAL BILAT STUDY: CPT

## 2025-03-14 PROCEDURE — 93880 EXTRACRANIAL BILAT STUDY: CPT | Performed by: SURGERY

## 2025-03-24 ENCOUNTER — HOSPITAL ENCOUNTER (EMERGENCY)
Facility: HOSPITAL | Age: 65
Discharge: HOME | End: 2025-03-25
Attending: EMERGENCY MEDICINE
Payer: MEDICARE

## 2025-03-24 ENCOUNTER — APPOINTMENT (EMERGENCY)
Dept: CT IMAGING | Facility: HOSPITAL | Age: 65
End: 2025-03-24
Payer: MEDICARE

## 2025-03-24 VITALS
SYSTOLIC BLOOD PRESSURE: 126 MMHG | RESPIRATION RATE: 16 BRPM | OXYGEN SATURATION: 98 % | DIASTOLIC BLOOD PRESSURE: 66 MMHG | TEMPERATURE: 98.3 F | HEART RATE: 67 BPM

## 2025-03-24 DIAGNOSIS — G93.9 LESION OF BRAIN: ICD-10-CM

## 2025-03-24 DIAGNOSIS — R41.3 MEMORY DIFFICULTIES: Primary | ICD-10-CM

## 2025-03-24 LAB
ALBUMIN SERPL BCG-MCNC: 4.6 G/DL (ref 3.5–5)
ALP SERPL-CCNC: 52 U/L (ref 34–104)
ALT SERPL W P-5'-P-CCNC: 19 U/L (ref 7–52)
ANION GAP SERPL CALCULATED.3IONS-SCNC: 8 MMOL/L (ref 4–13)
AST SERPL W P-5'-P-CCNC: 13 U/L (ref 13–39)
BASOPHILS # BLD AUTO: 0.04 THOUSANDS/ÂΜL (ref 0–0.1)
BASOPHILS NFR BLD AUTO: 1 % (ref 0–1)
BILIRUB SERPL-MCNC: 0.97 MG/DL (ref 0.2–1)
BILIRUB UR QL STRIP: NEGATIVE
BUN SERPL-MCNC: 19 MG/DL (ref 5–25)
CALCIUM SERPL-MCNC: 9.7 MG/DL (ref 8.4–10.2)
CARDIAC TROPONIN I PNL SERPL HS: <2 NG/L (ref ?–50)
CHLORIDE SERPL-SCNC: 98 MMOL/L (ref 96–108)
CLARITY UR: CLEAR
CO2 SERPL-SCNC: 31 MMOL/L (ref 21–32)
COLOR UR: ABNORMAL
CREAT SERPL-MCNC: 1.12 MG/DL (ref 0.6–1.3)
EOSINOPHIL # BLD AUTO: 0.08 THOUSAND/ÂΜL (ref 0–0.61)
EOSINOPHIL NFR BLD AUTO: 1 % (ref 0–6)
ERYTHROCYTE [DISTWIDTH] IN BLOOD BY AUTOMATED COUNT: 11.7 % (ref 11.6–15.1)
FLUAV AG UPPER RESP QL IA.RAPID: NEGATIVE
FLUBV AG UPPER RESP QL IA.RAPID: NEGATIVE
GFR SERPL CREATININE-BSD FRML MDRD: 68 ML/MIN/1.73SQ M
GLUCOSE SERPL-MCNC: 124 MG/DL (ref 65–140)
GLUCOSE UR STRIP-MCNC: ABNORMAL MG/DL
HCT VFR BLD AUTO: 43.1 % (ref 36.5–49.3)
HGB BLD-MCNC: 14.9 G/DL (ref 12–17)
HGB UR QL STRIP.AUTO: NEGATIVE
IMM GRANULOCYTES # BLD AUTO: 0.02 THOUSAND/UL (ref 0–0.2)
IMM GRANULOCYTES NFR BLD AUTO: 0 % (ref 0–2)
KETONES UR STRIP-MCNC: ABNORMAL MG/DL
LEUKOCYTE ESTERASE UR QL STRIP: NEGATIVE
LYMPHOCYTES # BLD AUTO: 2.56 THOUSANDS/ÂΜL (ref 0.6–4.47)
LYMPHOCYTES NFR BLD AUTO: 36 % (ref 14–44)
MCH RBC QN AUTO: 31.2 PG (ref 26.8–34.3)
MCHC RBC AUTO-ENTMCNC: 34.6 G/DL (ref 31.4–37.4)
MCV RBC AUTO: 90 FL (ref 82–98)
MONOCYTES # BLD AUTO: 0.53 THOUSAND/ÂΜL (ref 0.17–1.22)
MONOCYTES NFR BLD AUTO: 7 % (ref 4–12)
NEUTROPHILS # BLD AUTO: 3.92 THOUSANDS/ÂΜL (ref 1.85–7.62)
NEUTS SEG NFR BLD AUTO: 55 % (ref 43–75)
NITRITE UR QL STRIP: NEGATIVE
NRBC BLD AUTO-RTO: 0 /100 WBCS
PH UR STRIP.AUTO: 6 [PH]
PLATELET # BLD AUTO: 213 THOUSANDS/UL (ref 149–390)
PMV BLD AUTO: 10.1 FL (ref 8.9–12.7)
POTASSIUM SERPL-SCNC: 3.9 MMOL/L (ref 3.5–5.3)
PROT SERPL-MCNC: 7.4 G/DL (ref 6.4–8.4)
PROT UR STRIP-MCNC: NEGATIVE MG/DL
RBC # BLD AUTO: 4.77 MILLION/UL (ref 3.88–5.62)
SARS-COV+SARS-COV-2 AG RESP QL IA.RAPID: NEGATIVE
SODIUM SERPL-SCNC: 137 MMOL/L (ref 135–147)
SP GR UR STRIP.AUTO: 1.02 (ref 1–1.03)
UROBILINOGEN UR STRIP-ACNC: <2 MG/DL
WBC # BLD AUTO: 7.15 THOUSAND/UL (ref 4.31–10.16)

## 2025-03-24 PROCEDURE — 36415 COLL VENOUS BLD VENIPUNCTURE: CPT

## 2025-03-24 PROCEDURE — 80053 COMPREHEN METABOLIC PANEL: CPT

## 2025-03-24 PROCEDURE — 85025 COMPLETE CBC W/AUTO DIFF WBC: CPT

## 2025-03-24 PROCEDURE — 99285 EMERGENCY DEPT VISIT HI MDM: CPT | Performed by: EMERGENCY MEDICINE

## 2025-03-24 PROCEDURE — 87804 INFLUENZA ASSAY W/OPTIC: CPT

## 2025-03-24 PROCEDURE — 99285 EMERGENCY DEPT VISIT HI MDM: CPT

## 2025-03-24 PROCEDURE — 87811 SARS-COV-2 COVID19 W/OPTIC: CPT

## 2025-03-24 PROCEDURE — 70496 CT ANGIOGRAPHY HEAD: CPT

## 2025-03-24 PROCEDURE — 93005 ELECTROCARDIOGRAM TRACING: CPT

## 2025-03-24 PROCEDURE — 84484 ASSAY OF TROPONIN QUANT: CPT

## 2025-03-24 PROCEDURE — 70498 CT ANGIOGRAPHY NECK: CPT

## 2025-03-24 RX ADMIN — IOHEXOL 85 ML: 350 INJECTION, SOLUTION INTRAVENOUS at 22:23

## 2025-03-25 NOTE — ED PROVIDER NOTES
Time reflects when diagnosis was documented in both MDM as applicable and the Disposition within this note       Time User Action Codes Description Comment    3/24/2025 11:39 PM Kristofer Nicolas Add [R41.3] Memory difficulties     3/24/2025 11:46 PM Kristofer Nicolas Add [G93.9] Lesion of brain           ED Disposition       ED Disposition   Discharge    Condition   Stable    Date/Time   Mon Mar 24, 2025 11:41 PM    Comment   Alexis Dennison discharge to home/self care.                   Assessment & Plan       Medical Decision Making    64 y.o. male presenting for altered mental status over three weeks.  VSS, resting comfortably.  Patient was seen by PCP and ordered labs and CT of head.  Will obtain labs to evaluate for SIRS, anemia, electrolyte abnormality or LETY.  Will obtain EKG and troponin to evaluate for arrhythmia or myocarditis.  Will obtain UA as patient at risk for UTI.  Will obtain CT of head to evaluate for brain mass, intracranial hemorrhage or cerebrovascular disease.    Reassessment: VSS, resting comfortably.  Reviewed CT results in detail with patient and wife at bedside including finding of brain lesion for which MRI was advised to exclude cancerous etiology.    Disposition: I have discussed with the patient our plan to discharge them from the ED and the patient is in agreement with this plan.     Discharge Plan: Advised need for for prompt PCP follow-up and need for outpatient MRI to further evaluate brain lesion. RTED precautions emphasized. The patient was provided a written after visit summary with strict RTED precautions.     Followup: I have discussed with the patient plan to follow up with their PCP. Contact information provided in AVS.    Amount and/or Complexity of Data Reviewed  Radiology: ordered.    Risk  Prescription drug management.        ED Course as of 03/25/25 0120   Mon Mar 24, 2025   2116 Procedure Note: EKG  Date/Time: 03/24/25 9:16 PM   Interpreted by: Kristofer Nicolas,  DO  Indications / Diagnosis: Altered Mental Status  ECG reviewed by me, the ED Provider: yes   The EKG demonstrates:  Rhythm: normal sinus rhythm 75 BPM  Intervals: Normal AZ and QT intervals  Axis: Normal axis  QRS/Blocks: Normal QRS  ST Changes: No acute ST/T waves changes. No ISAIAH. TWI isolated to III.       Medications   iohexol (OMNIPAQUE) 350 MG/ML injection (MULTI-DOSE) 100 mL (85 mL Intravenous Given 3/24/25 2223)       ED Risk Strat Scores   HEART Risk Score      Flowsheet Row Most Recent Value   Heart Score Risk Calculator    History 0 Filed at: 03/24/2025 2201   ECG 1 Filed at: 03/24/2025 2201   Age 2 Filed at: 03/24/2025 2201   Risk Factors 2 Filed at: 03/24/2025 2201   Troponin 0 Filed at: 03/24/2025 2201   HEART Score 5 Filed at: 03/24/2025 2201          HEART Risk Score      Flowsheet Row Most Recent Value   Heart Score Risk Calculator    History 0 Filed at: 03/24/2025 2201   ECG 1 Filed at: 03/24/2025 2201   Age 2 Filed at: 03/24/2025 2201   Risk Factors 2 Filed at: 03/24/2025 2201   Troponin 0 Filed at: 03/24/2025 2201   HEART Score 5 Filed at: 03/24/2025 2201                              SBIRT 20yo+      Flowsheet Row Most Recent Value   Initial Alcohol Screen: US AUDIT-C     1. How often do you have a drink containing alcohol? 0 Filed at: 03/24/2025 2111   2. How many drinks containing alcohol do you have on a typical day you are drinking?  0 Filed at: 03/24/2025 2111   3a. Male UNDER 65: How often do you have five or more drinks on one occasion? 0 Filed at: 03/24/2025 2111   3b. FEMALE Any Age, or MALE 65+: How often do you have 4 or more drinks on one occassion? 0 Filed at: 03/24/2025 2111   Audit-C Score 0 Filed at: 03/24/2025 2111   ALEXI: How many times in the past year have you...    Used an illegal drug or used a prescription medication for non-medical reasons? Never Filed at: 03/24/2025 2111                            History of Present Illness       Chief Complaint   Patient presents with     Altered Mental Status     Patient reports to ED c/o altered mental status for multiple weeks. Patient can not remember things or how to do his work on the computer. Patient is slow to respond and poor historian in triage.        Past Medical History:   Diagnosis Date    Colon polyp     GERD (gastroesophageal reflux disease)     Hyperlipidemia     Hypertension     Sleep apnea       Past Surgical History:   Procedure Laterality Date    COLONOSCOPY      CYST REMOVAL      back    MS EXC B9 LESION MRGN XCP SK TG T/A/L 0.6-1.0 CM N/A 6/7/2023    Procedure: EXCISION  BIOPSY LESION/MASS ABDOMINAL/CHEST WALL;  Surgeon: Trevor Villavicencio MD;  Location:  MAIN OR;  Service: General      Family History   Problem Relation Age of Onset    Diabetes Paternal Grandfather     Diabetes unspecified Paternal Grandfather         Type 2    Colon polyps Neg Hx     Colon cancer Neg Hx       Social History     Tobacco Use    Smoking status: Never    Smokeless tobacco: Never   Vaping Use    Vaping status: Never Used   Substance Use Topics    Alcohol use: Yes     Alcohol/week: 1.0 standard drink of alcohol     Types: 1 Cans of beer per week     Comment: socially    Drug use: Never      E-Cigarette/Vaping    E-Cigarette Use Never User       E-Cigarette/Vaping Substances    Nicotine No     THC No     CBD No     Flavoring No     Other No     Unknown No       I have reviewed and agree with the history as documented.     Alexis Dennison is a 64 y.o. year old male with PMH of HTN, HLD, GERD presenting to the [facility] ED for confusion. Patient reporting forgetfulness over the past 3 weeks. His symptoms started after he returned from [place]. He believes he may have fallen and hit his head in the shower while in [place] otherwise no head injury since that time. He has had a pressure sensation in his head during this time and has had difficulty completing tasks at work. He denies visual changes, slurred speech or weakness/numbness in extremities. No  difficulty walking. No neck pain or stiffness. Denies fevers or chills. No chest pain, dyspnea or cough. Denies nausea/vomit/diarrhea or abdominal pain. No dysuria or hematuria.      History provided by:  Medical records and patient   used: No    Altered Mental Status  Presenting symptoms: confusion    Associated symptoms: headaches    Associated symptoms: no abdominal pain, no fever, no nausea, no vomiting and no weakness        Review of Systems   Constitutional:  Negative for chills and fever.   Respiratory:  Negative for cough and shortness of breath.    Cardiovascular:  Negative for chest pain.   Gastrointestinal:  Negative for abdominal pain, diarrhea, nausea and vomiting.   Genitourinary:  Negative for dysuria.   Musculoskeletal:  Negative for neck pain.   Neurological:  Positive for headaches. Negative for dizziness, facial asymmetry, speech difficulty, weakness and numbness.   Psychiatric/Behavioral:  Positive for confusion.    All other systems reviewed and are negative.          Objective       ED Triage Vitals   Temperature Pulse Blood Pressure Respirations SpO2 Patient Position - Orthostatic VS   03/24/25 2110 03/24/25 2110 03/24/25 2111 03/24/25 2110 03/24/25 2110 03/24/25 2140   98.3 °F (36.8 °C) 70 120/57 18 98 % Lying      Temp Source Heart Rate Source BP Location FiO2 (%) Pain Score    03/24/25 2110 03/24/25 2110 03/24/25 2140 -- --    Oral Monitor Left arm        Vitals      Date and Time Temp Pulse SpO2 Resp BP Pain Score FACES Pain Rating User   03/24/25 2140 -- 67 98 % 16 126/66 -- -- KO   03/24/25 2111 -- -- -- -- 120/57 -- -- RD   03/24/25 2110 98.3 °F (36.8 °C) 70 98 % 18 -- -- -- RD            Physical Exam  Vitals and nursing note reviewed.   Constitutional:       General: He is not in acute distress.     Appearance: He is well-developed. He is not ill-appearing, toxic-appearing or diaphoretic.   HENT:      Head: Normocephalic and atraumatic.      Nose: No congestion or  rhinorrhea.   Eyes:      General:         Right eye: No discharge.         Left eye: No discharge.      Extraocular Movements: Extraocular movements intact.      Pupils: Pupils are equal, round, and reactive to light.   Cardiovascular:      Rate and Rhythm: Normal rate and regular rhythm.   Pulmonary:      Effort: Pulmonary effort is normal. No respiratory distress.      Breath sounds: Normal breath sounds. No wheezing or rales.   Abdominal:      Palpations: Abdomen is soft.      Tenderness: There is no abdominal tenderness. There is no guarding or rebound.   Musculoskeletal:      Cervical back: Normal range of motion. No rigidity.   Skin:     General: Skin is warm.      Capillary Refill: Capillary refill takes less than 2 seconds.   Neurological:      Mental Status: He is alert and oriented to person, place, and time.      GCS: GCS eye subscore is 4. GCS verbal subscore is 5. GCS motor subscore is 6.      Cranial Nerves: No dysarthria or facial asymmetry.      Sensory: Sensation is intact.      Motor: Motor function is intact.      Comments: Strength +5/5 in bilateral UE/LE.  No vertical nystagmus noted.  CN III, IV and VI intact.  Cerebellar testing: Normal FNF without ataxia.  No pronator drift noted.       Psychiatric:         Mood and Affect: Affect is flat.         Results Reviewed       Procedure Component Value Units Date/Time    UA w Reflex to Microscopic w Reflex to Culture [[id number]]  (Abnormal) Collected: 03/24/25 2236    Lab Status: Final result Specimen: Urine, Clean Catch Updated: 03/24/25 2304     Color, UA Light Yellow     Clarity, UA Clear     Specific Gravity, UA 1.020     pH, UA 6.0     Leukocytes, UA Negative     Nitrite, UA Negative     Protein, UA Negative mg/dl      Glucose, UA 1000 (1%) mg/dl      Ketones, UA 40 (2+) mg/dl      Urobilinogen, UA <2.0 mg/dl      Bilirubin, UA Negative     Occult Blood, UA Negative    HS Troponin 0hr (reflex protocol) [[id number]]  (Normal) Collected: 03/24/25  2116    Lab Status: Final result Specimen: Blood from Arm, Right Updated: 03/24/25 2147     hs TnI 0hr <2 ng/L     Comprehensive metabolic panel [[id number]] Collected: 03/24/25 2116    Lab Status: Final result Specimen: Blood from Arm, Right Updated: 03/24/25 2143     Sodium 137 mmol/L      Potassium 3.9 mmol/L      Chloride 98 mmol/L      CO2 31 mmol/L      ANION GAP 8 mmol/L      BUN 19 mg/dL      Creatinine 1.12 mg/dL      Glucose 124 mg/dL      Calcium 9.7 mg/dL      AST 13 U/L      ALT 19 U/L      Alkaline Phosphatase 52 U/L      Total Protein 7.4 g/dL      Albumin 4.6 g/dL      Total Bilirubin 0.97 mg/dL      eGFR 68 ml/min/1.73sq m     Narrative:      National Kidney Disease Foundation guidelines for Chronic Kidney Disease (CKD):     Stage 1 with normal or high GFR (GFR > 90 mL/min/1.73 square meters)    Stage 2 Mild CKD (GFR = 60-89 mL/min/1.73 square meters)    Stage 3A Moderate CKD (GFR = 45-59 mL/min/1.73 square meters)    Stage 3B Moderate CKD (GFR = 30-44 mL/min/1.73 square meters)    Stage 4 Severe CKD (GFR = 15-29 mL/min/1.73 square meters)    Stage 5 End Stage CKD (GFR <15 mL/min/1.73 square meters)  Note: GFR calculation is accurate only with a steady state creatinine    FLU/COVID Rapid Antigen (30 min. TAT) - Preferred screening test in ED [[id number]]  (Normal) Collected: 03/24/25 2117    Lab Status: Final result Specimen: Nares from Nose Updated: 03/24/25 2140     SARS COV Rapid Antigen Negative     Influenza A Rapid Antigen Negative     Influenza B Rapid Antigen Negative    Narrative:      This test has been performed using the Quidel Dana 2 FLU+SARS Antigen test under the Emergency Use Authorization (EUA). This test has been validated by the  and verified by the performing laboratory. The Dana uses lateral flow immunofluorescent sandwich assay to detect SARS-COV, Influenza A and Influenza B Antigen.     The Quidel Dana 2 SARS Antigen test does not differentiate between SARS-CoV  and SARS-CoV-2.     Negative results are presumptive and may be confirmed with a molecular assay, if necessary, for patient management. Negative results do not rule out SARS-CoV-2 or influenza infection and should not be used as the sole basis for treatment or patient management decisions. A negative test result may occur if the level of antigen in a sample is below the limit of detection of this test.     Positive results are indicative of the presence of viral antigens, but do not rule out bacterial infection or co-infection with other viruses.     All test results should be used as an adjunct to clinical observations and other information available to the provider.    FOR PEDIATRIC PATIENTS - copy/paste COVID Guidelines URL to browser: [email or web address]    CBC and differential [[id number]] Collected: 03/24/25 2116    Lab Status: Final result Specimen: Blood from Arm, Right Updated: 03/24/25 2125     WBC 7.15 Thousand/uL      RBC 4.77 Million/uL      Hemoglobin 14.9 g/dL      Hematocrit 43.1 %      MCV 90 fL      MCH 31.2 pg      MCHC 34.6 g/dL      RDW 11.7 %      MPV 10.1 fL      Platelets 213 Thousands/uL      nRBC 0 /100 WBCs      Segmented % 55 %      Immature Grans % 0 %      Lymphocytes % 36 %      Monocytes % 7 %      Eosinophils Relative 1 %      Basophils Relative 1 %      Absolute Neutrophils 3.92 Thousands/µL      Absolute Immature Grans 0.02 Thousand/uL      Absolute Lymphocytes 2.56 Thousands/µL      Absolute Monocytes 0.53 Thousand/µL      Eosinophils Absolute 0.08 Thousand/µL      Basophils Absolute 0.04 Thousands/µL             CTA head and neck with and without contrast   Final Interpretation by Enrike Rodriguez MD (03/24 8289)      CT Brain:  No acute intracranial abnormality.      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 No prior  studies are available for comparison. Further evaluation with MRI brain with and without contrast is    recommended.   There is also mild asymmetric prominence of the left lateral ventricle when compared to the right which may be developmental nature and can be further evaluated at the time of the MRI brain.      CT Angiography: No focal stenosis or saccular aneurysm within the [place].      No hemodynamically significant stenosis within either common or internal carotid artery. Less than 50% stenosis by NASCET criteria.      Incidental thyroid nodule(s) for which nonemergent thyroid ultrasound is recommended.            Workstation performed: [id number]             Procedures    ED Medication and Procedure Management   Prior to Admission Medications   Prescriptions Last Dose Informant Patient Reported? Taking?   Cholecalciferol (Vitamin D) 50 MCG (2000 UT) CAPS  Self Yes No   Sig: Take 2,000 Units by mouth in the morning   Cyanocobalamin (Vitamin B 12) 500 MCG TABS  Self Yes No   Sig: Take by mouth daily   SITagliptin-metFORMIN HCl ER (Janumet XR)  MG TB24   No No   Sig: Take 1 tablet by mouth in the morning   aspirin (ECOTRIN LOW STRENGTH) 81 mg EC tablet  Self Yes No   Sig: Take by mouth daily 1/2 tablet daily   atorvastatin (LIPITOR) 20 mg tablet  Self Yes No   Sig: Take 20 mg by mouth daily   losartan-hydrochlorothiazide (HYZAAR) 100-25 MG per tablet  Self Yes No   Sig: TAKE 1 TABLET BY MOUTH EVERY DAY FOR 30 DAYS   metFORMIN (GLUCOPHAGE-XR) 500 mg 24 hr tablet   No No   Sig: Take 2 tablets (1,000 mg total) by mouth in the morning   omeprazole (PriLOSEC) 20 mg delayed release capsule  Self Yes No   Sig: Take 10 mg by mouth daily      Facility-Administered Medications: None     Discharge Medication List as of 3/25/2025 12:03 AM        CONTINUE these medications which have NOT CHANGED    Details   aspirin (ECOTRIN LOW STRENGTH) 81 mg EC tablet Take by mouth daily 1/2 tablet daily, Historical Med       atorvastatin (LIPITOR) 20 mg tablet Take 20 mg by mouth daily, Historical Med      Cholecalciferol (Vitamin D) 50 MCG (2000 UT) CAPS Take 2,000 Units by mouth in the morning, Historical Med      Cyanocobalamin (Vitamin B 12) 500 MCG TABS Take by mouth daily, Historical Med      losartan-hydrochlorothiazide (HYZAAR) 100-25 MG per tablet TAKE 1 TABLET BY MOUTH EVERY DAY FOR 30 DAYS, Historical Med      metFORMIN (GLUCOPHAGE-XR) 500 mg 24 hr tablet Take 2 tablets (1,000 mg total) by mouth in the morning, Starting Tue 2/11/2025, Normal      omeprazole (PriLOSEC) 20 mg delayed release capsule Take 10 mg by mouth daily, Historical Med      SITagliptin-metFORMIN HCl ER (Janumet XR)  MG TB24 Take 1 tablet by mouth in the morning, Starting Thu 12/12/2024, Fill Later           No discharge procedures on file.  ED SEPSIS DOCUMENTATION   Time reflects when diagnosis was documented in both MDM as applicable and the Disposition within this note       Time User Action Codes Description Comment    3/24/2025 11:39 PM Kristofer Nicolas [R41.3] Memory difficulties     3/24/2025 11:46 PM Kristofer Nicolas [G93.9] Lesion of brain                  Kristofer Nicolas, DO  03/25/25 0120

## 2025-03-25 NOTE — DISCHARGE INSTRUCTIONS
You have been seen for confusion. Your CT shows a brain lesion for which an outpatient brain MRI is recommended.   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 No prior studies are available for comparison. Further evaluation with MRI brain with and without contrast is recommended.  Please contact your PCP to arrange the outpatient MRI. Return to the emergency department if you develop worsening headaches, vomiting, weakness/numbness, fevers or any other symptoms of concern. Please follow up with your PCP by calling the number provided.

## 2025-03-26 ENCOUNTER — HOSPITAL ENCOUNTER (OUTPATIENT)
Dept: MRI IMAGING | Facility: HOSPITAL | Age: 65
Discharge: HOME | End: 2025-03-26
Payer: COMMERCIAL

## 2025-03-26 DIAGNOSIS — R41.0 DISORIENTATION, UNSPECIFIED: ICD-10-CM

## 2025-03-26 DIAGNOSIS — G93.9 DISORDER OF BRAIN, UNSPECIFIED: ICD-10-CM

## 2025-03-26 DIAGNOSIS — R42 DIZZINESS AND GIDDINESS: ICD-10-CM

## 2025-03-26 DIAGNOSIS — I51.7 CARDIOMEGALY: ICD-10-CM

## 2025-03-26 DIAGNOSIS — R41.3 OTHER AMNESIA: ICD-10-CM

## 2025-03-26 PROCEDURE — A9585 GADOBUTROL INJECTION: HCPCS | Performed by: RADIOLOGY

## 2025-03-26 PROCEDURE — 70553 MRI BRAIN STEM W/O & W/DYE: CPT

## 2025-03-26 RX ORDER — GADOBUTROL 604.72 MG/ML
9 INJECTION INTRAVENOUS
Status: COMPLETED | OUTPATIENT
Start: 2025-03-26 | End: 2025-03-26

## 2025-03-26 RX ADMIN — GADOBUTROL 9 ML: 604.72 INJECTION INTRAVENOUS at 13:50

## 2025-03-27 ENCOUNTER — OFFICE VISIT (OUTPATIENT)
Dept: ENDOCRINOLOGY | Facility: HOSPITAL | Age: 65
End: 2025-03-27
Payer: COMMERCIAL

## 2025-03-27 ENCOUNTER — TELEPHONE (OUTPATIENT)
Dept: NEUROSURGERY | Facility: CLINIC | Age: 65
End: 2025-03-27

## 2025-03-27 VITALS
BODY MASS INDEX: 26.54 KG/M2 | HEART RATE: 76 BPM | SYSTOLIC BLOOD PRESSURE: 108 MMHG | DIASTOLIC BLOOD PRESSURE: 70 MMHG | WEIGHT: 189.6 LBS | HEIGHT: 71 IN

## 2025-03-27 DIAGNOSIS — E78.2 MIXED HYPERLIPIDEMIA: ICD-10-CM

## 2025-03-27 DIAGNOSIS — E11.65 TYPE 2 DIABETES MELLITUS WITH HYPERGLYCEMIA, WITHOUT LONG-TERM CURRENT USE OF INSULIN (HCC): Primary | ICD-10-CM

## 2025-03-27 DIAGNOSIS — I10 HTN, GOAL BELOW 130/80: ICD-10-CM

## 2025-03-27 PROBLEM — Z79.4 TYPE 2 DIABETES MELLITUS WITH DIABETIC NEUROPATHY, WITH LONG-TERM CURRENT USE OF INSULIN (HCC): Status: RESOLVED | Noted: 2024-04-04 | Resolved: 2025-03-27

## 2025-03-27 PROBLEM — E11.40 TYPE 2 DIABETES MELLITUS WITH DIABETIC NEUROPATHY, WITH LONG-TERM CURRENT USE OF INSULIN (HCC): Status: RESOLVED | Noted: 2024-04-04 | Resolved: 2025-03-27

## 2025-03-27 LAB
ATRIAL RATE: 75 BPM
P AXIS: 33 DEGREES
PR INTERVAL: 146 MS
QRS AXIS: 47 DEGREES
QRSD INTERVAL: 86 MS
QT INTERVAL: 372 MS
QTC INTERVAL: 416 MS
T WAVE AXIS: -5 DEGREES
VENTRICULAR RATE: 75 BPM

## 2025-03-27 PROCEDURE — 93010 ELECTROCARDIOGRAM REPORT: CPT | Performed by: INTERNAL MEDICINE

## 2025-03-27 PROCEDURE — 99214 OFFICE O/P EST MOD 30 MIN: CPT | Performed by: PHYSICIAN ASSISTANT

## 2025-03-27 RX ORDER — SITAGLIPTIN AND METFORMIN HYDROCHLORIDE 1000; 50 MG/1; MG/1
1 TABLET, FILM COATED, EXTENDED RELEASE ORAL DAILY
Qty: 90 TABLET | Refills: 2 | Status: ON HOLD | OUTPATIENT
Start: 2025-03-27

## 2025-03-27 NOTE — PATIENT INSTRUCTIONS
Continue with lifestyle modifications to help improve glucose levels.     Continue with Janumet and metformin.     Recommend checking blood sugars at least daily.     Contact the office with any concerns or questions.     Follow-up in 6 months with lab work prior to visit.

## 2025-03-27 NOTE — TELEPHONE ENCOUNTER
Received a consult message from PATI Mckeon requesting for this patient to be seen and evaluated.    Per Dr. Berman     RE: consult  Received: Today  MD Evita Dickens CRNP;  Phone Number: [phone]     Could you please work to have this patient seen today or this coming Tuesday

## 2025-03-27 NOTE — PROGRESS NOTES
Name: Alexis Dennison      : 1960      MRN: [id number]  Encounter Provider: Edvin Dior PA-C  Encounter Date: 3/27/2025   Encounter department: [facility]    No chief complaint on file.  :  Assessment & Plan  Type 2 diabetes mellitus with hyperglycemia, without long-term current use of insulin (HCC)  Most recent hemoglobin A1c remains stable and within target range.  At this time he will continue with metformin 1000 mg daily and Janumet  mg once a day.  Continue working on lifestyle modifications to help improve glucose levels.  Since everything has been going extremely well from an endocrine standpoint we will extend out office visits to once every 6 months.  Contact the office with any concerns or questions.  Make sure to get lab work completed prior to next office visit.  Lab Results   Component Value Date    HGBA1C 6.6 (H) 2025       Orders:  •  Hemoglobin A1C; Future  •  Comprehensive metabolic panel; Future  •  SITagliptin-metFORMIN HCl ER (Janumet XR)  MG TB24; Take 1 tablet by mouth in the morning    HTN, goal below 130/80  Normotensive in the office today with normal kidney function.  Continue with current medications.  Repeat CMP prior to next office visit.       Mixed hyperlipidemia  Lipid panel doing excellent.  Continue with atorvastatin.  Will continue to monitor over time.           History of Present Illness     Alexis Dennison is a 64 y.o. male with type 2 diabetes diagnosed in .  He is on oral agents at home and takes metformin 1000 mg daily and Janumet XR  mg daily.  At last office visit he was recommended to switch from metformin to just Janumet, but he had concerns about taking too much medication and is taking 1 metformin daily and 1 Janumet daily.  He denies any polyuria, polydipsia, nocturia and blurry vision.  He denies neuropathy, nephropathy, retinopathy, heart attack, stroke, and claudication.   States that he eats a relatively healthy diabetic diet.  Does admit that he eats a lot of chinese as his wife is Chinese.  Does remain physically active with his main form of activity being walking.    Unfortunately for the past 3 weeks he has had issues with his memory and altered mental status.  This was discussed with PCP and he did visit him to the emergency room March 24, 2025 for stroke evaluation was done.  CTA came back negative for any stroke, but there was other abnormalities noted in the brain and had an MRI completed yesterday.     Hypoglycemic episodes: No.      The patient's last eye exam was in October 2022.  The patient's last foot exam was in April 2024.     Blood Sugar/Glucometer/Pump/CGM review: No blood sugar logs present at today's office visit.     For hypertension he is currently on losartan/HCTZ 100-25 mg daily.  For hyperlipidemia he is currently on atorvastatin 20 mg daily.  Denies any headaches, vision changes, chest pain, MI or strokelike symptoms.    Current regimen:   Metformin 1000 mg daily, Janumet  mg daily      Last Eye Exam: 10/28/2022  Last Foot Exam: 04/04/2024  Health Maintenance   Topic Date Due   • Diabetic Eye Exam  10/28/2024   • Diabetic Foot Exam  04/04/2025         Review of Systems   Constitutional:  Negative for activity change, appetite change, fatigue and unexpected weight change.   HENT:  Negative for trouble swallowing.    Eyes:  Negative for visual disturbance.   Respiratory:  Negative for chest tightness and shortness of breath.    Cardiovascular:  Negative for chest pain, palpitations and leg swelling.   Gastrointestinal:  Negative for abdominal pain, diarrhea, nausea and vomiting.   Endocrine: Negative for cold intolerance, heat intolerance, polydipsia, polyphagia and polyuria.   Genitourinary:  Negative for frequency.   Skin:  Negative for rash and wound.   Neurological:  Negative for dizziness, weakness, light-headedness, numbness and headaches.  "  Psychiatric/Behavioral:  Negative for dysphoric mood and sleep disturbance. The patient is not nervous/anxious.         Altered mental status and memory difficulty    as per HPI    Medical History Reviewed by provider this encounter:     .  Current Outpatient Medications on File Prior to Visit   Medication Sig Dispense Refill   • aspirin (ECOTRIN LOW STRENGTH) 81 mg EC tablet Take by mouth daily 1/2 tablet daily     • atorvastatin (LIPITOR) 20 mg tablet Take 20 mg by mouth daily     • Cholecalciferol (Vitamin D) 50 MCG (2000 UT) CAPS Take 2,000 Units by mouth in the morning     • Cyanocobalamin (Vitamin B 12) 500 MCG TABS Take by mouth daily     • losartan-hydrochlorothiazide (HYZAAR) 100-25 MG per tablet TAKE 1 TABLET BY MOUTH EVERY DAY FOR 30 DAYS     • metFORMIN (GLUCOPHAGE-XR) 500 mg 24 hr tablet Take 2 tablets (1,000 mg total) by mouth in the morning 180 tablet 3   • omeprazole (PriLOSEC) 20 mg delayed release capsule Take 10 mg by mouth daily     • [DISCONTINUED] SITagliptin-metFORMIN HCl ER (Janumet XR)  MG TB24 Take 1 tablet by mouth in the morning 90 tablet 2     Current Facility-Administered Medications on File Prior to Visit   Medication Dose Route Frequency Provider Last Rate Last Admin   • [COMPLETED] Gadobutrol injection (SINGLE-DOSE) SOLN 9 mL  9 mL Intravenous Once in imaging Bradley Landon Kocher, MD   9 mL at 03/26/25 1350      Social History     Tobacco Use   • Smoking status: Never   • Smokeless tobacco: Never   Vaping Use   • Vaping status: Never Used   Substance and Sexual Activity   • Alcohol use: Yes     Alcohol/week: 1.0 standard drink of alcohol     Types: 1 Cans of beer per week     Comment: socially   • Drug use: Never   • Sexual activity: Not Currently     Partners: Female        Medical History Reviewed by provider this encounter:     .    Objective   /70   Pulse 76   Ht 5' 11\" (1.803 m)   Wt 86 kg (189 lb 9.6 oz)   BMI 26.44 kg/m²      Body mass index is 26.44 " kg/m².  Wt Readings from Last 3 Encounters:   03/27/25 86 kg (189 lb 9.6 oz)   12/12/24 89.3 kg (196 lb 12.8 oz)   08/23/24 85.6 kg (188 lb 12.8 oz)     Physical Exam  Vitals and nursing note reviewed.   Constitutional:       General: He is not in acute distress.     Appearance: Normal appearance. He is well-developed. He is not diaphoretic.   Eyes:      General: No scleral icterus.     Extraocular Movements: Extraocular movements intact.      Conjunctiva/sclera: Conjunctivae normal.      Pupils: Pupils are equal, round, and reactive to light.   Cardiovascular:      Rate and Rhythm: Normal rate and regular rhythm.      Pulses: Normal pulses.      Heart sounds: Normal heart sounds. No murmur heard.     No friction rub. No gallop.   Pulmonary:      Effort: Pulmonary effort is normal. No tachypnea, bradypnea or respiratory distress.      Breath sounds: Normal breath sounds. No wheezing.   Musculoskeletal:      Cervical back: Normal range of motion.      Right lower leg: No edema.      Left lower leg: No edema.   Lymphadenopathy:      Cervical: No cervical adenopathy.   Skin:     General: Skin is warm and dry.   Neurological:      Mental Status: He is alert. He is not disoriented.      Motor: No abnormal muscle tone.      Gait: Gait normal.      Deep Tendon Reflexes: Reflexes are normal and symmetric.   Psychiatric:         Mood and Affect: Mood normal.         Labs:   Lab Results   Component Value Date    HGBA1C 6.6 (H) 02/22/2025    HGBA1C 6.3 (H) 11/09/2024    HGBA1C 6.5 (H) 08/09/2024     Lab Results   Component Value Date    CREATININE 1.12 03/24/2025    CREATININE 1.08 02/22/2025    CREATININE 1.02 11/09/2024    BUN 19 03/24/2025    K 3.9 03/24/2025    CL 98 03/24/2025    CO2 31 03/24/2025     eGFR   Date Value Ref Range Status   03/24/2025 68 ml/min/1.73sq m Final     Lab Results   Component Value Date    HDL 36 (L) 02/22/2025    TRIG 95 02/22/2025     Lab Results   Component Value Date    ALT 19 03/24/2025     "AST 13 03/24/2025    ALKPHOS 52 03/24/2025     No results found for: \"[id number]\"    Patient Instructions   Continue with lifestyle modifications to help improve glucose levels.     Continue with Janumet and metformin.     Recommend checking blood sugars at least daily.     Contact the office with any concerns or questions.     Follow-up in 6 months with lab work prior to visit.    Discussed with the patient and all questioned fully answered. He will call me if any problems arise.      "

## 2025-03-27 NOTE — TELEPHONE ENCOUNTER
Please call patient to complete intake and schedule Urgent spot, 4/3/2025.  Please add as high priority on wait list and advise patient we will call him to move up this appointment as soon as possible.

## 2025-03-27 NOTE — ASSESSMENT & PLAN NOTE
Normotensive in the office today with normal kidney function.  Continue with current medications.  Repeat CMP prior to next office visit.

## 2025-03-27 NOTE — ASSESSMENT & PLAN NOTE
Most recent hemoglobin A1c remains stable and within target range.  At this time he will continue with metformin 1000 mg daily and Janumet  mg once a day.  Continue working on lifestyle modifications to help improve glucose levels.  Since everything has been going extremely well from an endocrine standpoint we will extend out office visits to once every 6 months.  Contact the office with any concerns or questions.  Make sure to get lab work completed prior to next office visit.  Lab Results   Component Value Date    HGBA1C 6.6 (H) 02/22/2025       Orders:  •  Hemoglobin A1C; Future  •  Comprehensive metabolic panel; Future  •  SITagliptin-metFORMIN HCl ER (Janumet XR)  MG TB24; Take 1 tablet by mouth in the morning

## 2025-03-28 ENCOUNTER — TELEPHONE (OUTPATIENT)
Age: 65
End: 2025-03-28

## 2025-03-28 NOTE — TELEPHONE ENCOUNTER
Pt phoned in error, as this RN intended to phone pt daughter.  This RN asked patient how he was doing and he stated that he was hanging in there, this RN reminded pt of apt next week and that he is on call list should we have a sooner apt available.  Pt informed that this RN intended to call pt daughter as she had a couple questions.  Pt stated understanding, pt encouraged to call the office with any questions or concerns that arise prior to his apt.     Pt daughter phoned.  She was informed that this RN just phoned pt and conversation summarized with pt daughter.      She was encouraged if pt is presenting with additional neurological changes then pt should be evaluated.  Pt daughter stated that when she spoke with him earlier he seemed to be slurring his words and he was confusing her with his aunt.  She stated this started today. She noted that she lives two hours away but after her  gets home they are heading to pt house.  She inquired if pt should get abdominal CT to check for potential metastasis; pt daughter informed that is a good question but one that should await till office visit.  Pt daughter stated understanding.      In regards to if there is a sooner apt, pt daughter informed that pt is on wait list and that should something open up we would give a call.  Added to APT Notes is request to call pt and then daughter regarding any wait list opportunities.    Pt daughter appreciative for assistance and plans to assess her dad more thoroughly once she gets there.

## 2025-03-28 NOTE — TELEPHONE ENCOUNTER
03/28/25 PT'S DAUGHTER FANTASMA, NOT ON CONSENT BUT IS EMERGENCY CONTACT, HAD CONCERNS ABOUT HER FATHER ERIN. SHE STATES HE IS HAVING MENTAL CHANGES AND IS MORE CONFUSED. HE DOES HAVE AN APPT ON 04/03/25 WITH [facility] BUT WASN'T SURE IF THERE WERE ANY SOONER APPTS. PT'S DAUGHTER ALSO HAD SOME QUESTIONS. I TRIED TO WARM TRANSFER TO NURSE BUT WASN'T ABLE TO REACH ANYONE. I INFORMED FANTASMA THAT I WOULD SEND A MESSAGE TO ONE OF THE NURSES. FANTASMA HAS AN APPT AT NOON SO SHE WOULD LIKE A CALL BACK SOMETIME AFTER THAT. [phone]

## 2025-03-29 ENCOUNTER — HOSPITAL ENCOUNTER (INPATIENT)
Facility: HOSPITAL | Age: 65
LOS: 39 days | DRG: 820 | End: 2025-05-07
Attending: EMERGENCY MEDICINE | Admitting: ANESTHESIOLOGY
Payer: COMMERCIAL

## 2025-03-29 ENCOUNTER — APPOINTMENT (EMERGENCY)
Dept: RADIOLOGY | Facility: HOSPITAL | Age: 65
DRG: 820 | End: 2025-03-29
Payer: COMMERCIAL

## 2025-03-29 DIAGNOSIS — R93.89 ABNORMAL CT SCAN: ICD-10-CM

## 2025-03-29 DIAGNOSIS — R78.81 GRAM-NEGATIVE BACTEREMIA: ICD-10-CM

## 2025-03-29 DIAGNOSIS — C85.90 LYMPHOMA (HCC): ICD-10-CM

## 2025-03-29 DIAGNOSIS — G93.40 ENCEPHALOPATHY, UNSPECIFIED TYPE: ICD-10-CM

## 2025-03-29 DIAGNOSIS — L03.119 CELLULITIS OF FOOT: ICD-10-CM

## 2025-03-29 DIAGNOSIS — G91.9 HYDROCEPHALUS (HCC): ICD-10-CM

## 2025-03-29 DIAGNOSIS — N17.9 AKI (ACUTE KIDNEY INJURY) (HCC): ICD-10-CM

## 2025-03-29 DIAGNOSIS — G93.89 BRAIN MASS: ICD-10-CM

## 2025-03-29 DIAGNOSIS — R41.0 CONFUSION: ICD-10-CM

## 2025-03-29 DIAGNOSIS — C83.390 PRIMARY CNS LYMPHOMA: Primary | ICD-10-CM

## 2025-03-29 DIAGNOSIS — K59.01 SLOW TRANSIT CONSTIPATION: ICD-10-CM

## 2025-03-29 LAB
ANION GAP SERPL CALCULATED.3IONS-SCNC: 7 MMOL/L (ref 4–13)
ATRIAL RATE: 67 BPM
BASOPHILS # BLD AUTO: 0.04 THOUSANDS/ÂΜL (ref 0–0.1)
BASOPHILS NFR BLD AUTO: 1 % (ref 0–1)
BUN SERPL-MCNC: 24 MG/DL (ref 5–25)
CALCIUM SERPL-MCNC: 9.6 MG/DL (ref 8.4–10.2)
CHLORIDE SERPL-SCNC: 101 MMOL/L (ref 96–108)
CO2 SERPL-SCNC: 30 MMOL/L (ref 21–32)
CREAT SERPL-MCNC: 1.11 MG/DL (ref 0.6–1.3)
EOSINOPHIL # BLD AUTO: 0.12 THOUSAND/ÂΜL (ref 0–0.61)
EOSINOPHIL NFR BLD AUTO: 2 % (ref 0–6)
ERYTHROCYTE [DISTWIDTH] IN BLOOD BY AUTOMATED COUNT: 11.8 % (ref 11.6–15.1)
GFR SERPL CREATININE-BSD FRML MDRD: 69 ML/MIN/1.73SQ M
GLUCOSE SERPL-MCNC: 105 MG/DL (ref 65–140)
HCT VFR BLD AUTO: 41.5 % (ref 36.5–49.3)
HGB BLD-MCNC: 14.6 G/DL (ref 12–17)
IMM GRANULOCYTES # BLD AUTO: 0.03 THOUSAND/UL (ref 0–0.2)
IMM GRANULOCYTES NFR BLD AUTO: 0 % (ref 0–2)
LYMPHOCYTES # BLD AUTO: 2.33 THOUSANDS/ÂΜL (ref 0.6–4.47)
LYMPHOCYTES NFR BLD AUTO: 32 % (ref 14–44)
MCH RBC QN AUTO: 31.5 PG (ref 26.8–34.3)
MCHC RBC AUTO-ENTMCNC: 35.2 G/DL (ref 31.4–37.4)
MCV RBC AUTO: 90 FL (ref 82–98)
MONOCYTES # BLD AUTO: 0.53 THOUSAND/ÂΜL (ref 0.17–1.22)
MONOCYTES NFR BLD AUTO: 7 % (ref 4–12)
NEUTROPHILS # BLD AUTO: 4.31 THOUSANDS/ÂΜL (ref 1.85–7.62)
NEUTS SEG NFR BLD AUTO: 58 % (ref 43–75)
NRBC BLD AUTO-RTO: 0 /100 WBCS
P AXIS: 27 DEGREES
PLATELET # BLD AUTO: 209 THOUSANDS/UL (ref 149–390)
PMV BLD AUTO: 10.2 FL (ref 8.9–12.7)
POTASSIUM SERPL-SCNC: 3.9 MMOL/L (ref 3.5–5.3)
PR INTERVAL: 152 MS
QRS AXIS: 33 DEGREES
QRSD INTERVAL: 94 MS
QT INTERVAL: 384 MS
QTC INTERVAL: 405 MS
RBC # BLD AUTO: 4.63 MILLION/UL (ref 3.88–5.62)
SODIUM SERPL-SCNC: 138 MMOL/L (ref 135–147)
T WAVE AXIS: -1 DEGREES
VENTRICULAR RATE: 67 BPM
WBC # BLD AUTO: 7.36 THOUSAND/UL (ref 4.31–10.16)

## 2025-03-29 PROCEDURE — 99223 1ST HOSP IP/OBS HIGH 75: CPT | Performed by: INTERNAL MEDICINE

## 2025-03-29 PROCEDURE — 99285 EMERGENCY DEPT VISIT HI MDM: CPT

## 2025-03-29 PROCEDURE — 80048 BASIC METABOLIC PNL TOTAL CA: CPT

## 2025-03-29 PROCEDURE — 74174 CTA ABD&PLVS W/CONTRAST: CPT

## 2025-03-29 PROCEDURE — 93005 ELECTROCARDIOGRAM TRACING: CPT

## 2025-03-29 PROCEDURE — 36415 COLL VENOUS BLD VENIPUNCTURE: CPT

## 2025-03-29 PROCEDURE — 93010 ELECTROCARDIOGRAM REPORT: CPT | Performed by: INTERNAL MEDICINE

## 2025-03-29 PROCEDURE — 85025 COMPLETE CBC W/AUTO DIFF WBC: CPT

## 2025-03-29 PROCEDURE — 70450 CT HEAD/BRAIN W/O DYE: CPT

## 2025-03-29 PROCEDURE — 71275 CT ANGIOGRAPHY CHEST: CPT

## 2025-03-29 PROCEDURE — 99285 EMERGENCY DEPT VISIT HI MDM: CPT | Performed by: EMERGENCY MEDICINE

## 2025-03-29 RX ORDER — LANOLIN ALCOHOL/MO/W.PET/CERES
1000 CREAM (GRAM) TOPICAL DAILY
Status: ON HOLD | COMMUNITY

## 2025-03-29 RX ADMIN — IOHEXOL 100 ML: 350 INJECTION, SOLUTION INTRAVENOUS at 20:24

## 2025-03-29 NOTE — ED PROVIDER NOTES
Time reflects when diagnosis was documented in both MDM as applicable and the Disposition within this note       Time User Action Codes Description Comment    3/29/2025 10:58 PM Jose Lara Add [G93.89] Brain mass     3/29/2025 10:58 PM Jose Lara Add [R41.0] Confusion     3/29/2025 11:08 PM Edwin Herrera Add [R93.89] Abnormal CT scan           ED Disposition       ED Disposition   Admit    Condition   Stable    Date/Time   Sat Mar 29, 2025 10:58 PM    Comment                  Assessment & Plan       Medical Decision Making  Patient is a 64-year-old male presenting with confusion and known brain mass.    Differential includes but not limited to progressive disease, acute intracranial hemorrhage, intracranial edema.  Will order CT head for hemorrhage versus edema.  CT chest abdomen pelvis to look for possible source of mass.  CT head without acute concerns.    Patient admitted for further management and workup.    Amount and/or Complexity of Data Reviewed  Labs: ordered.  Radiology: ordered.    Risk  Prescription drug management.  Decision regarding hospitalization.             Medications   iohexol (OMNIPAQUE) 350 MG/ML injection (MULTI-DOSE) 100 mL (100 mL Intravenous Given 3/29/25 2024)       ED Risk Strat Scores                    Identification of Seniors at Risk      Flowsheet Row Most Recent Value   (ISAR) Identification of Seniors at Risk    Before the illness or injury that brought you to the Emergency, did you need someone to help you on a regular basis? 0 Filed at: 03/29/2025 1830   In the last 24 hours, have you needed more help than usual? 0 Filed at: 03/29/2025 1830   Have you been hospitalized for one or more nights during the past 6 months? 0 Filed at: 03/29/2025 1830   In general, do you see well? 0 Filed at: 03/29/2025 1830   In general, do you have serious problems with your memory? 1 Filed at: 03/29/2025 1830   Do you take more than three different medications every day? 1 Filed at:  03/29/2025 1830   ISAR Score 2 Filed at: 03/29/2025 1830                SBIRT 20yo+      Flowsheet Row Most Recent Value   Initial Alcohol Screen: US AUDIT-C     1. How often do you have a drink containing alcohol? 0 Filed at: 03/29/2025 1831   2. How many drinks containing alcohol do you have on a typical day you are drinking?  0 Filed at: 03/29/2025 1831   3b. FEMALE Any Age, or MALE 65+: How often do you have 4 or more drinks on one occassion? 0 Filed at: 03/29/2025 1831   Audit-C Score 0 Filed at: 03/29/2025 1831   ALEXI: How many times in the past year have you...    Used an illegal drug or used a prescription medication for non-medical reasons? Never Filed at: 03/29/2025 1831                            History of Present Illness       Chief Complaint   Patient presents with    Medical Problem     Pt was seen at ED on Monday, had a CT. Pt told that he has a brain mass. Pt then went for MRI on Wednesday 3/26. Since then, family noticed that he is increasingly more confused since last night. Pt c/o a frontal head ache.       Past Medical History:   Diagnosis Date    Colon polyp     Diabetes mellitus (HCC)     GERD (gastroesophageal reflux disease)     Hyperlipidemia     Hypertension     Liver disease     Sleep apnea       Past Surgical History:   Procedure Laterality Date    COLONOSCOPY      CYST REMOVAL      back    MA EXC B9 LESION MRGN XCP SK TG T/A/L 0.6-1.0 CM N/A 6/7/2023    Procedure: EXCISION  BIOPSY LESION/MASS ABDOMINAL/CHEST WALL;  Surgeon: Trevor Villavicencio MD;  Location:  MAIN OR;  Service: General      Family History   Problem Relation Age of Onset    Cancer Mother     Cancer Father     Diabetes Paternal Grandfather     Diabetes unspecified Paternal Grandfather         Type 2    Colon polyps Neg Hx     Colon cancer Neg Hx       Social History     Tobacco Use    Smoking status: Never    Smokeless tobacco: Never   Vaping Use    Vaping status: Never Used   Substance Use Topics    Alcohol use: Yes      Alcohol/week: 1.0 standard drink of alcohol     Types: 1 Cans of beer per week     Comment: socially    Drug use: Never      E-Cigarette/Vaping    E-Cigarette Use Never User       E-Cigarette/Vaping Substances    Nicotine No     THC No     CBD No     Flavoring No     Other No     Unknown No       I have reviewed and agree with the history as documented.     Patient is a 64-year-old male presenting with known brain mass and worsening confusion.  Patient was recently diagnosed with a brain mass via CT and MRI.  He has scheduled neurosurgery outpatient follow-up but his confusion has been worsening over the last few days.  He is forgetting people's names and getting lost and having more difficulty taking care of himself at home.  Patient has no other complaints at this time.  Patient's family has not noticed any focal findings, just the confusion.  He has not had any other recent illnesses.        Review of Systems        Objective       ED Triage Vitals [03/29/25 1828]   Temperature Pulse Blood Pressure Respirations SpO2 Patient Position - Orthostatic VS   97.9 °F (36.6 °C) 75 110/70 18 97 % Sitting      Temp Source Heart Rate Source BP Location FiO2 (%) Pain Score    Oral Monitor Left arm -- 4      Vitals      Date and Time Temp Pulse SpO2 Resp BP Pain Score FACES Pain Rating User   03/30/25 2356 98.5 °F (36.9 °C) 64 95 % -- 112/63 -- -- DII   03/30/25 2130 -- -- -- 17 -- -- -- CAW   03/30/25 2130 99.4 °F (37.4 °C) 62 95 % -- 106/58 -- -- DII   03/30/25 2048 -- -- -- -- -- No Pain -- EB   03/30/25 1931 -- -- -- 17 -- -- -- EB   03/30/25 1931 98.7 °F (37.1 °C) 61 94 % -- 109/59 -- -- DII   03/30/25 1511 98.1 °F (36.7 °C) 60 98 % 16 110/62 -- -- DII   03/30/25 1035 -- -- -- 16 -- No Pain -- CG   03/30/25 1035 98.8 °F (37.1 °C) 67 93 % -- 120/72 -- -- DII   03/30/25 0711 98.6 °F (37 °C) -- -- 16 -- No Pain -- CG   03/30/25 0711 -- 65 93 % -- 121/74 -- -- DII   03/30/25 0443 98.8 °F (37.1 °C) 59 96 % -- 113/68 -- -- DII    03/30/25 0442 98.8 °F (37.1 °C) 61 94 % -- 113/68 -- -- DII   03/30/25 0111 -- -- -- -- -- No Pain -- EB   03/29/25 2356 -- 67 -- 17 -- -- -- EB   03/29/25 2356 98.7 °F (37.1 °C) -- -- -- 115/68 -- -- DII   03/29/25 2312 -- 66 94 % 16 113/62 No Pain -- MB   03/29/25 1945 -- 70 94 % 15 130/74 -- -- MB   03/29/25 1828 97.9 °F (36.6 °C) 75 97 % 18 110/70 4 -- JS            Physical Exam  Constitutional:       General: He is not in acute distress.     Appearance: Normal appearance. He is not ill-appearing, toxic-appearing or diaphoretic.   HENT:      Head: Normocephalic and atraumatic.      Mouth/Throat:      Mouth: Mucous membranes are moist.      Pharynx: Oropharynx is clear.   Eyes:      Extraocular Movements: Extraocular movements intact.      Pupils: Pupils are equal, round, and reactive to light.   Cardiovascular:      Rate and Rhythm: Normal rate and regular rhythm.   Pulmonary:      Effort: Pulmonary effort is normal.      Breath sounds: Normal breath sounds.   Abdominal:      General: Abdomen is flat.      Palpations: Abdomen is soft.      Tenderness: There is no abdominal tenderness.   Musculoskeletal:         General: Normal range of motion.      Cervical back: Normal range of motion and neck supple.   Skin:     General: Skin is warm and dry.   Neurological:      General: No focal deficit present.      Mental Status: He is alert. He is disoriented.      Cranial Nerves: No cranial nerve deficit.      Sensory: No sensory deficit.      Motor: No weakness.      Comments: Oriented to self and location but not time         Results Reviewed       Procedure Component Value Units Date/Time    Basic metabolic panel [[id number]] Collected: 03/29/25 2019    Lab Status: Final result Specimen: Blood from Arm, Left Updated: 03/29/25 2051     Sodium 138 mmol/L      Potassium 3.9 mmol/L      Chloride 101 mmol/L      CO2 30 mmol/L      ANION GAP 7 mmol/L      BUN 24 mg/dL      Creatinine 1.11 mg/dL      Glucose 105 mg/dL       Calcium 9.6 mg/dL      eGFR 69 ml/min/1.73sq m     Narrative:      National Kid[employer] Foundation guidelines for Chronic Kidney Disease (CKD):     Stage 1 with normal or high GFR (GFR > 90 mL/min/1.73 square meters)    Stage 2 Mild CKD (GFR = 60-89 mL/min/1.73 square meters)    Stage 3A Moderate CKD (GFR = 45-59 mL/min/1.73 square meters)    Stage 3B Moderate CKD (GFR = 30-44 mL/min/1.73 square meters)    Stage 4 Severe CKD (GFR = 15-29 mL/min/1.73 square meters)    Stage 5 End Stage CKD (GFR <15 mL/min/1.73 square meters)  Note: GFR calculation is accurate only with a steady state creatinine    CBC and differential [[id number]] Collected: 03/29/25 2019    Lab Status: Final result Specimen: Blood from Arm, Left Updated: 03/29/25 2029     WBC 7.36 Thousand/uL      RBC 4.63 Million/uL      Hemoglobin 14.6 g/dL      Hematocrit 41.5 %      MCV 90 fL      MCH 31.5 pg      MCHC 35.2 g/dL      RDW 11.8 %      MPV 10.2 fL      Platelets 209 Thousands/uL      nRBC 0 /100 WBCs      Segmented % 58 %      Immature Grans % 0 %      Lymphocytes % 32 %      Monocytes % 7 %      Eosinophils Relative 2 %      Basophils Relative 1 %      Absolute Neutrophils 4.31 Thousands/µL      Absolute Immature Grans 0.03 Thousand/uL      Absolute Lymphocytes 2.33 Thousands/µL      Absolute Monocytes 0.53 Thousand/µL      Eosinophils Absolute 0.12 Thousand/µL      Basophils Absolute 0.04 Thousands/µL             CT head wo contrast   Final Interpretation by Lonnie Arellano MD (03/29 2224)      Stable subependymal nodules and left foramen of De Oliveira region hyperdense lesion again seen with associated mild symmetrical dilatation of the left lateral ventricle and minimal left to right midline shift. The overall findings are unchanged compared to    the prior study. No acute intracranial hemorrhage or evidence of acute cerebral infarction.                  Workstation performed: [id number]         CTA chest abdomen pelvis w wo contrast   Final  Interpretation by Oswaldo Sewell MD (03/29 2126)      1.  There is a nonspecific 12 mm hypodense lesion within the right hepatic lobe. While this may represent a hemangioma, a metastatic lesion can not be excluded. This is suboptimally evaluated on the current exam, due to arterial timing of the contrast    bolus and background diffuse hepatic steatosis. Further evaluation by gadolinium enhanced MRI of the abdomen is recommended.   2.  Nonspecific 4 mm right lower lobe pulmonary nodule. In the setting of a known malignancy, a 3-month follow-up chest CT could be performed for further evaluation.   3.  Heterogeneous nodular enlargement of the left thyroid lobe. A nonemergent thyroid ultrasound follow-up is recommended.      4.  Please refer to the report body for description of other incidental, chronic and/or benign findings.      The study was marked in EPIC for significant notification.         Workstation performed: [id number]         FL IN-patient lumbar puncture    (Results Pending)   MRI abdomen w wo contrast    (Results Pending)       Procedures    ED Medication and Procedure Management   Prior to Admission Medications   Prescriptions Last Dose Informant Patient Reported? Taking?   Cholecalciferol (Vitamin D) 50 MCG (2000 UT) CAPS 3/29/2025 Self Yes Yes   Sig: Take 2,000 Units by mouth in the morning   Cyanocobalamin (Vitamin B 12) 500 MCG TABS 3/29/2025 Self Yes Yes   Sig: Take by mouth daily   SITagliptin-metFORMIN HCl ER (Janumet XR)  MG TB24 3/29/2025  No Yes   Sig: Take 1 tablet by mouth in the morning   aspirin (ECOTRIN LOW STRENGTH) 81 mg EC tablet 3/29/2025 Self Yes Yes   Sig: Take by mouth daily 1/2 tablet daily   atorvastatin (LIPITOR) 20 mg tablet 3/29/2025 Self Yes Yes   Sig: Take 20 mg by mouth daily   losartan-hydrochlorothiazide (HYZAAR) 100-25 MG per tablet 3/29/2025 Self Yes Yes   Sig: TAKE 1 TABLET BY MOUTH EVERY DAY FOR 30 DAYS   metFORMIN (GLUCOPHAGE-XR) 500 mg 24 hr tablet  3/29/2025 Self No Yes   Sig: Take 2 tablets (1,000 mg total) by mouth in the morning   omeprazole (PriLOSEC) 20 mg delayed release capsule 3/29/2025 Self Yes Yes   Sig: Take 10 mg by mouth daily   vitamin B-12 (VITAMIN B-12) 1,000 mcg tablet 3/29/2025  Yes Yes   Sig: Take 1,000 mcg by mouth daily      Facility-Administered Medications: None     Current Discharge Medication List        CONTINUE these medications which have NOT CHANGED    Details   aspirin (ECOTRIN LOW STRENGTH) 81 mg EC tablet Take by mouth daily 1/2 tablet daily      atorvastatin (LIPITOR) 20 mg tablet Take 20 mg by mouth daily      Cholecalciferol (Vitamin D) 50 MCG (2000 UT) CAPS Take 2,000 Units by mouth in the morning      !! Cyanocobalamin (Vitamin B 12) 500 MCG TABS Take by mouth daily      losartan-hydrochlorothiazide (HYZAAR) 100-25 MG per tablet TAKE 1 TABLET BY MOUTH EVERY DAY FOR 30 DAYS      metFORMIN (GLUCOPHAGE-XR) 500 mg 24 hr tablet Take 2 tablets (1,000 mg total) by mouth in the morning  Qty: 180 tablet, Refills: 3    Associated Diagnoses: Type 2 diabetes mellitus with hyperglycemia, without long-term current use of insulin (HCC)      omeprazole (PriLOSEC) 20 mg delayed release capsule Take 10 mg by mouth daily      SITagliptin-metFORMIN HCl ER (Janumet XR)  MG TB24 Take 1 tablet by mouth in the morning  Qty: 90 tablet, Refills: 2    Associated Diagnoses: Type 2 diabetes mellitus with hyperglycemia, without long-term current use of insulin (HCC)      !! vitamin B-12 (VITAMIN B-12) 1,000 mcg tablet Take 1,000 mcg by mouth daily       !! - Potential duplicate medications found. Please discuss with provider.        No discharge procedures on file.  ED SEPSIS DOCUMENTATION   Time reflects when diagnosis was documented in both MDM as applicable and the Disposition within this note       Time User Action Codes Description Comment    3/29/2025 10:58 PM Jose Lara Add [G93.89] Brain mass     3/29/2025 10:58 PM Jose Lara  Add [R41.0] Confusion     3/29/2025 11:08 PM Edwin Herrera Add [R93.89] Abnormal CT scan                  Jose Lara MD  03/31/25 7614

## 2025-03-30 ENCOUNTER — APPOINTMENT (INPATIENT)
Dept: RADIOLOGY | Facility: HOSPITAL | Age: 65
DRG: 820 | End: 2025-03-30
Payer: COMMERCIAL

## 2025-03-30 PROBLEM — R93.89 ABNORMAL CT SCAN: Status: RESOLVED | Noted: 2025-03-29 | Resolved: 2025-03-30

## 2025-03-30 PROBLEM — E04.1 THYROID NODULE: Status: ACTIVE | Noted: 2025-03-30

## 2025-03-30 PROBLEM — D49.6 BRAIN TUMOR (HCC): Status: ACTIVE | Noted: 2025-03-29

## 2025-03-30 PROBLEM — R91.1 PULMONARY NODULE: Status: ACTIVE | Noted: 2025-03-30

## 2025-03-30 PROBLEM — K76.9 LIVER LESION: Status: ACTIVE | Noted: 2025-03-30

## 2025-03-30 LAB
GLUCOSE SERPL-MCNC: 102 MG/DL (ref 65–140)
GLUCOSE SERPL-MCNC: 116 MG/DL (ref 65–140)
GLUCOSE SERPL-MCNC: 139 MG/DL (ref 65–140)
GLUCOSE SERPL-MCNC: 157 MG/DL (ref 65–140)
GLUCOSE SERPL-MCNC: 171 MG/DL (ref 65–140)

## 2025-03-30 PROCEDURE — 99223 1ST HOSP IP/OBS HIGH 75: CPT | Performed by: NEUROLOGICAL SURGERY

## 2025-03-30 PROCEDURE — 82948 REAGENT STRIP/BLOOD GLUCOSE: CPT

## 2025-03-30 PROCEDURE — 74183 MRI ABD W/O CNTR FLWD CNTR: CPT

## 2025-03-30 PROCEDURE — 99232 SBSQ HOSP IP/OBS MODERATE 35: CPT | Performed by: INTERNAL MEDICINE

## 2025-03-30 PROCEDURE — 99255 IP/OBS CONSLTJ NEW/EST HI 80: CPT | Performed by: INTERNAL MEDICINE

## 2025-03-30 PROCEDURE — A9585 GADOBUTROL INJECTION: HCPCS | Performed by: INTERNAL MEDICINE

## 2025-03-30 RX ORDER — PANTOPRAZOLE SODIUM 20 MG/1
20 TABLET, DELAYED RELEASE ORAL
Status: DISCONTINUED | OUTPATIENT
Start: 2025-03-30 | End: 2025-04-13

## 2025-03-30 RX ORDER — ONDANSETRON 2 MG/ML
4 INJECTION INTRAMUSCULAR; INTRAVENOUS EVERY 6 HOURS PRN
Status: DISCONTINUED | OUTPATIENT
Start: 2025-03-30 | End: 2025-05-07 | Stop reason: HOSPADM

## 2025-03-30 RX ORDER — ASPIRIN 81 MG/1
81 TABLET ORAL DAILY
Status: DISCONTINUED | OUTPATIENT
Start: 2025-03-30 | End: 2025-04-10

## 2025-03-30 RX ORDER — ACETAMINOPHEN 325 MG/1
650 TABLET ORAL EVERY 6 HOURS PRN
Status: DISCONTINUED | OUTPATIENT
Start: 2025-03-30 | End: 2025-04-14

## 2025-03-30 RX ORDER — HYDROCHLOROTHIAZIDE 25 MG/1
25 TABLET ORAL DAILY
Status: DISCONTINUED | OUTPATIENT
Start: 2025-03-30 | End: 2025-04-17

## 2025-03-30 RX ORDER — INSULIN LISPRO 100 [IU]/ML
1-6 INJECTION, SOLUTION INTRAVENOUS; SUBCUTANEOUS
Status: DISCONTINUED | OUTPATIENT
Start: 2025-03-30 | End: 2025-04-13

## 2025-03-30 RX ORDER — GADOBUTROL 604.72 MG/ML
8 INJECTION INTRAVENOUS
Status: COMPLETED | OUTPATIENT
Start: 2025-03-30 | End: 2025-03-30

## 2025-03-30 RX ORDER — SODIUM CHLORIDE 9 MG/ML
75 INJECTION, SOLUTION INTRAVENOUS CONTINUOUS
Status: DISCONTINUED | OUTPATIENT
Start: 2025-03-30 | End: 2025-04-08

## 2025-03-30 RX ORDER — INSULIN LISPRO 100 [IU]/ML
1-5 INJECTION, SOLUTION INTRAVENOUS; SUBCUTANEOUS
Status: DISCONTINUED | OUTPATIENT
Start: 2025-03-30 | End: 2025-04-13

## 2025-03-30 RX ORDER — LOSARTAN POTASSIUM 50 MG/1
100 TABLET ORAL DAILY
Status: DISCONTINUED | OUTPATIENT
Start: 2025-03-30 | End: 2025-04-17

## 2025-03-30 RX ORDER — ATORVASTATIN CALCIUM 20 MG/1
20 TABLET, FILM COATED ORAL DAILY
Status: DISCONTINUED | OUTPATIENT
Start: 2025-03-30 | End: 2025-05-07 | Stop reason: HOSPADM

## 2025-03-30 RX ADMIN — Medication 2000 UNITS: at 08:14

## 2025-03-30 RX ADMIN — ATORVASTATIN CALCIUM 20 MG: 20 TABLET, FILM COATED ORAL at 08:12

## 2025-03-30 RX ADMIN — CYANOCOBALAMIN TAB 500 MCG 1000 MCG: 500 TAB at 08:14

## 2025-03-30 RX ADMIN — PANTOPRAZOLE SODIUM 20 MG: 20 TABLET, DELAYED RELEASE ORAL at 04:45

## 2025-03-30 RX ADMIN — HYDROCHLOROTHIAZIDE 25 MG: 25 TABLET ORAL at 08:13

## 2025-03-30 RX ADMIN — INSULIN LISPRO 1 UNITS: 100 INJECTION, SOLUTION INTRAVENOUS; SUBCUTANEOUS at 11:03

## 2025-03-30 RX ADMIN — LOSARTAN POTASSIUM 100 MG: 50 TABLET, FILM COATED ORAL at 08:13

## 2025-03-30 RX ADMIN — GADOBUTROL 8 ML: 604.72 INJECTION INTRAVENOUS at 23:48

## 2025-03-30 RX ADMIN — INSULIN LISPRO 1 UNITS: 100 INJECTION, SOLUTION INTRAVENOUS; SUBCUTANEOUS at 17:02

## 2025-03-30 RX ADMIN — SODIUM CHLORIDE 75 ML/HR: 0.9 INJECTION, SOLUTION INTRAVENOUS at 11:38

## 2025-03-30 NOTE — ASSESSMENT & PLAN NOTE
"Patient with development of worsening confusion, recently seen at the [facility] ED 3/24/2025 with CT head at that time with finding of brain lesion for which MRI was advised to exclude cancerous etiology.  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  CTA chest/abdomen/pelvis with findings as detailed below  Appreciate neurosurgery and oncology evaluations  Monitor neurologic status closely  "

## 2025-03-30 NOTE — ASSESSMENT & PLAN NOTE
CTA chest/abdomen/pelvis with findings as below:  4 mm right lower lobe pulmonary nodule for which 3-month follow-up chest CT was advised  Nonspecific 12mm hypodense lesion within the hepatic lobe possibly representing hemangioma however metastatic lesion could not be excluded although suboptimally evaluated on the current exam.  Will obtain MRI of abdomen to better clarify  Heterogenous nodular enhancement of left thyroid lobe for which nonemergent thyroid ultrasound was advised.  Await oncology input for all of above but likely thyroid ultrasound can be obtained as outpatient

## 2025-03-30 NOTE — ASSESSMENT & PLAN NOTE
Lab Results   Component Value Date    HGBA1C 6.6 (H) 02/22/2025   Has been well-controlled as outpatient  Hold home oral medications, start correctional insulin coverage with Accu-Cheks while admitted  Carb controlled diet  Initiate hypoglycemia protocol

## 2025-03-30 NOTE — ASSESSMENT & PLAN NOTE
Multifocal process on MRI brain  MRI Brain 3/26/25: Multiple scattered foci of subependymal nodular enhancement throughout ventricles (left worse than right) with mild hydrocephalus (left worse than right), scattered nodular areas of enhancement in left anterior inferior basal ganglia involving left   anterior commissure, and smaller foci of nodular enhancement along anteromedial aspect of left cerebral peduncle and left quirino. Differential includes lymphoma, metastasis, multicentric high-grade glioma with subependymal spread of tumor, among other differentials. Recommend neurosurgical consultation for further evaluation. Consider correlation with CSF analysis.  Suggestive of CNS lymphoma, but agree with wide DDx in MRI report  Would attempt LP with cytology to achieve diagnosis - from there therapy would be IV with med onc - suggest holding 81 mg ASA for that intervention  If LP unsuccessful for diagnosis, can consider stereotactic needle biopsy  If able, would hold off on steroid management until Dx achieved

## 2025-03-30 NOTE — PLAN OF CARE
Problem: PAIN - ADULT  Goal: Verbalizes/displays adequate comfort level or baseline comfort level  Description: Interventions:- Encourage patient to monitor pain and request assistance- Assess pain using appropriate pain scale- Administer analgesics based on type and severity of pain and evaluate response- Implement non-pharmacological measures as appropriate and evaluate response- Notify physician/advanced practitioner if interventions unsuccessful or patient reports new pain  Outcome: Progressing     Problem: INFECTION - ADULT  Goal: Absence or prevention of progression during hospitalization  Description: INTERVENTIONS:- Assess and monitor for signs and symptoms of infection- Monitor lab/diagnostic results- Monitor all insertion sites, i.e. indwelling lines, tubes, and drains- [place] appropriate cooling/warming therapies per order- Administer medications as ordered- Instruct and encourage patient and family to use good hand hygiene technique- Identify and instruct in appropriate isolation precautions for identified infection/condition  Outcome: Progressing     Problem: SAFETY ADULT  Goal: Patient will remain free of falls  Description: INTERVENTIONS:- Educate patient/family on patient safety including physical limitations- Instruct patient to call for assistance with activity - Consult OT/PT to assist with strengthening/mobility - Keep Call bell within reach- Keep bed low and locked with side rails adjusted as appropriate- Keep care items and personal belongings within reach- Initiate and maintain comfort rounds- Make Fall Risk Sign visible to staff- Offer Toileting every 2 Hours, in advance of need- Initiate/Maintain bed/chair alarm- Obtain necessary fall risk management equipment.- Apply yellow socks and bracelet for high fall risk patients- Consider moving patient to room near nurses station  Outcome: Progressing  Goal: Maintain or return to baseline ADL function  Description: INTERVENTIONS:-  Assess  patient's ability to carry out ADLs; assess patient's baseline for ADL function and identify physical deficits which impact ability to perform ADLs (bathing, care of mouth/teeth, toileting, grooming, dressing, etc.)- Assess/evaluate cause of self-care deficits - Assess range of motion- Assess patient's mobility; develop plan if impaired- Assess patient's need for assistive devices and provide as appropriate- Encourage maximum independence but intervene and supervise when necessary- Involve family in performance of ADLs- Assess for home care needs following discharge - Consider OT consult to assist with ADL evaluation and planning for discharge- Provide patient education as appropriate  Outcome: Progressing  Goal: Maintains/Returns to pre admission functional level  Description: INTERVENTIONS:- Perform AM-PAC 6 Click Basic Mobility/ Daily Activity assessment daily.- Set and communicate daily mobility goal to care team and patient/family/caregiver. - Collaborate with rehabilitation services on mobility goals if consulted- Reposition patient every 2 hours.- Dangle patient 3 times a day- Stand patient 3 times a day- Ambulate patient 3 times a day- Out of bed to chair 3 times a day - Out of bed for meals 3 times a day- Out of bed for toileting- Record patient progress and toleration of activity level   Outcome: Progressing     Problem: DISCHARGE PLANNING  Goal: Discharge to home or other facility with appropriate resources  Description: INTERVENTIONS:- Identify barriers to discharge w/patient and caregiver- Arrange for needed discharge resources and transportation as appropriate- Identify discharge learning needs (meds, wound care, etc.)- Refer to Case Management Department for coordinating discharge planning if the patient needs post-hospital services based on physician/advanced practitioner order or complex needs related to functional status, cognitive ability, or social support system  Outcome: Progressing      Problem: Knowledge Deficit  Goal: Patient/family/caregiver demonstrates understanding of disease process, treatment plan, medications, and discharge instructions  Description: Complete learning assessment and assess knowledge base.Interventions:- Provide teaching at level of understanding- Provide teaching via preferred learning methods  Outcome: Progressing     Problem: NEUROSENSORY - ADULT  Goal: Achieves stable or improved neurological status  Description: INTERVENTIONS- Monitor and report changes in neurological status- Monitor vital signs such as temperature, blood pressure, glucose, and any other labs ordered - Initiate measures to prevent increased intracranial pressure- Monitor for seizure activity and implement precautions if appropriate    Outcome: Progressing  Goal: Remains free of injury related to seizures activity  Description: INTERVENTIONS- Maintain airway, patient safety  and administer oxygen as ordered- Monitor patient for seizure activity, document and report duration and description of seizure to physician/advanced practitioner- If seizure occurs,  ensure patient safety during seizure- Reorient patient post seizure- Seizure pads on all 4 side rails- Instruct patient/family to notify RN of any seizure activity including if an aura is experienced- Instruct patient/family to call for assistance with activity based on nursing assessment- Administer anti-seizure medications if ordered  Outcome: Progressing  Goal: Achieves maximal functionality and self care  Description: INTERVENTIONS- Monitor swallowing and airway patency with patient fatigue and changes in neurological status- Encourage and assist patient to increase activity and self care. - Encourage visually impaired, hearing impaired and aphasic patients to use assistive/communication devices  Outcome: Progressing     Problem: METABOLIC, FLUID AND ELECTROLYTES - ADULT  Goal: Glucose maintained within target range  Description: INTERVENTIONS:-  Monitor Blood Glucose as ordered- Assess for signs and symptoms of hyperglycemia and hypoglycemia- Administer ordered medications to maintain glucose within target range- Assess nutritional intake and initiate nutrition service referral as needed  Outcome: Progressing

## 2025-03-30 NOTE — CONSULTS
Alexis Calixber  1960    HEMATOLOGY/ONCOLOGY CONSULTATION REPORT    DISCUSSION/SUMMARY:    64-year-old male with history of diabetes, hyperlipidemia, hypertension, sleep apnea, reflux disease admitted with mental status changes.  Recent MRI of the brain demonstrates multiple scattered foci of subependymal nodular enhancement, mild left hydrocephalus.  Radiologist included differential diagnosis: Lymphoma, metastasis, glioma etc.    Recent CAT scans demonstrated a nonspecific 12 mm lesion within the right lobe of the liver, 4 mm right lower lobe pulmonary nodule and heterogeneous nodular enhancement of the left thyroid - no obvious evidence of a primary malignancy below the neck.    Family demonstrated a very good understanding of the situation, understands that the concern is a malignancy.  LP is pending.  Tissue obviously needed.  Patient has already been seen/evaluated by neurosurgery -  stereotactic needle biopsy and option if LP nondiagnostic.  Steroid treatment on hold for the time being.    The above was discussed with the patient's family members; all questions were answered.  Will follow with you.  Please do not hesitate to contact me if you have any questions or need additional information.  Thank you for this consult.  ______________________________________________________________________________    Chief Complaint   Patient presents with    Medical Problem     Pt was seen at ED on Monday, had a CT. Pt told that he has a brain mass. Pt then went for MRI on Wednesday 3/26. Since then, family noticed that he is increasingly more confused since last night. Pt c/o a frontal head ache.     History of Present Illness: 64-year-old male admitted on 3/29/2025.  Patient was brought in by family members because of worsening confusion, decreased ability to speak, forgetfulness.  Recent MRI results concerning for potential primary CNS malignancy.    Patient was found in bed in no apparent distress, most of the  information was obtained from the patient's family members.  No evidence of any pain.  Appetite is +/-, no nausea or vomiting.  Above symptoms relatively recent.    Review of Systems   Unable to perform ROS: Acuity of condition     Patient Active Problem List   Diagnosis    Type 2 diabetes mellitus with hyperglycemia, without long-term current use of insulin (HCC)    HTN, goal below 130/80    Mixed hyperlipidemia    Vitamin D deficiency    NAFLD (nonalcoholic fatty liver disease)    Fatigue    Brain tumor (HCC)     Past Medical History:   Diagnosis Date    Colon polyp     Diabetes mellitus (HCC)     GERD (gastroesophageal reflux disease)     Hyperlipidemia     Hypertension     Liver disease     Sleep apnea      Past Surgical History:   Procedure Laterality Date    COLONOSCOPY      CYST REMOVAL      back    WV EXC B9 LESION MRGN XCP SK TG T/A/L 0.6-1.0 CM N/A 6/7/2023    Procedure: EXCISION  BIOPSY LESION/MASS ABDOMINAL/CHEST WALL;  Surgeon: Trevor Villavicencio MD;  Location:  MAIN OR;  Service: General     Family History   Problem Relation Age of Onset    Cancer Mother     Cancer Father     Diabetes Paternal Grandfather     Diabetes unspecified Paternal Grandfather         Type 2    Colon polyps Neg Hx     Colon cancer Neg Hx      Social History     Socioeconomic History    Marital status: /Civil Union     Spouse name: Not on file    Number of children: Not on file    Years of education: Not on file    Highest education level: Not on file   Occupational History    Not on file   Tobacco Use    Smoking status: Never    Smokeless tobacco: Never   Vaping Use    Vaping status: Never Used   Substance and Sexual Activity    Alcohol use: Yes     Alcohol/week: 1.0 standard drink of alcohol     Types: 1 Cans of beer per week     Comment: socially    Drug use: Never    Sexual activity: Not Currently     Partners: Female   Other Topics Concern    Not on file   Social History Narrative    Not on file     Social Drivers of  Health     Financial Resource Strain: Not on file   Food Insecurity: Not on file   Transportation Needs: Not on file   Physical Activity: Not on file   Stress: Not on file   Social Connections: Not on file   Intimate Partner Violence: Not on file   Housing Stability: Not on file       Current Facility-Administered Medications:     acetaminophen (TYLENOL) tablet 650 mg, 650 mg, Oral, Q6H PRN, Edwin Herrera DO    [Held by provider] aspirin (ECOTRIN LOW STRENGTH) EC tablet 81 mg, 81 mg, Oral, Daily, Edwin Herrera DO    atorvastatin (LIPITOR) tablet 20 mg, 20 mg, Oral, Daily, Edwin Herrera DO, 20 mg at 03/30/25 0812    Cholecalciferol (VITAMIN D3) tablet 2,000 Units, 2,000 Units, Oral, Daily, Edwin Herrera DO, 2,000 Units at 03/30/25 0814    cyanocobalamin (VITAMIN B-12) tablet 1,000 mcg, 1,000 mcg, Oral, Daily, Edwin Herrera DO, 1,000 mcg at 03/30/25 0814    losartan (COZAAR) tablet 100 mg, 100 mg, Oral, Daily, 100 mg at 03/30/25 0813 **AND** hydroCHLOROthiazide tablet 25 mg, 25 mg, Oral, Daily, Edwin Herrera DO, 25 mg at 03/30/25 0813    insulin lispro (HumALOG/ADMELOG) 100 units/mL subcutaneous injection 1-5 Units, 1-5 Units, Subcutaneous, HS, Edwin Herrera DO    insulin lispro (HumALOG/ADMELOG) 100 units/mL subcutaneous injection 1-6 Units, 1-6 Units, Subcutaneous, TID AC, 1 Units at 03/30/25 1103 **AND** Fingerstick Glucose (POCT), , , TID AC, Edwin Herrera DO    ondansetron (ZOFRAN) injection 4 mg, 4 mg, Intravenous, Q6H PRN, Edwin Herrera DO    pantoprazole (PROTONIX) EC tablet 20 mg, 20 mg, Oral, Early Morning, Edwin Herrera DO, 20 mg at 03/30/25 0445    sodium chloride 0.9 % infusion, 75 mL/hr, Intravenous, Continuous, Griffin Conway MD, Last Rate: 75 mL/hr at 03/30/25 1138, 75 mL/hr at 03/30/25 1138    No Known Allergies    Vitals:    03/30/25 1511   BP: 110/62   Pulse: 60   Resp: 16   Temp: 98.1 °F (36.7 °C)   SpO2: 98%     Physical Exam  Constitutional:       Appearance: He is  well-developed.      Comments: Well-nourished middle-age male, no respiratory distress, no signs of pain   HENT:      Head: Normocephalic and atraumatic.      Right Ear: External ear normal.      Left Ear: External ear normal.   Eyes:      Conjunctiva/sclera: Conjunctivae normal.      Pupils: Pupils are equal, round, and reactive to light.   Cardiovascular:      Rate and Rhythm: Normal rate and regular rhythm.      Heart sounds: Normal heart sounds.   Pulmonary:      Effort: Pulmonary effort is normal.      Breath sounds: Normal breath sounds.   Abdominal:      General: Bowel sounds are normal.      Palpations: Abdomen is soft.   Musculoskeletal:         General: Normal range of motion.      Cervical back: Normal range of motion and neck supple.   Skin:     General: Skin is warm.      Comments: Warm, moist, good color, no petechiae or ecchymoses   Neurological:      Mental Status: He is alert.      Deep Tendon Reflexes: Reflexes are normal and symmetric.      Comments: Patient not able to supply any information     Extremities: No lower extremity edema bilaterally, no cords, pulses are 1+    Labs    3/29/2025 WBC = 7.36 hemoglobin = 14.6 hematocrit = 41.5 MCV = 90 platelet = 209 neutrophil = 58% BUN = 24 creatinine = 1.11    3/24/2025 AST = 13 ALT = 19 alkaline phosphatase = 52 total protein = 7.4 total bilirubin = 0.97    Imaging    3/29/2025 CTA chest abdomen pelvis with without contrast      IMPRESSION:     1.  There is a nonspecific 12 mm hypodense lesion within the right hepatic lobe. While this may represent a hemangioma, a metastatic lesion can not be excluded. This is suboptimally evaluated on the current exam, due to arterial timing of the contrast bolus and background diffuse hepatic steatosis. Further evaluation by gadolinium enhanced MRI of the abdomen is recommended.  2.  Nonspecific 4 mm right lower lobe pulmonary nodule. In the setting of a known malignancy, a 3-month follow-up chest CT could be  performed for further evaluation.  3.  Heterogeneous nodular enlargement of the left thyroid lobe. A nonemergent thyroid ultrasound follow-up is recommended.     4.  Please refer to the report body for description of other incidental, chronic and/or benign findings.    3/26/2025 MRI brain with and without contrast    IMPRESSION:     Multiple scattered foci of subependymal nodular enhancement throughout ventricles (left worse than right) with mild hydrocephalus (left worse than right), scattered nodular areas of enhancement in left anterior inferior basal ganglia involving left anterior commissure, and smaller foci of nodular enhancement along anteromedial aspect of left cerebral peduncle and left quirino. Differential includes lymphoma, metastasis, multicentric high-grade glioma with subependymal spread of tumor, among other differentials. Recommend neurosurgical consultation for further evaluation. Consider correlation with CSF analysis.

## 2025-03-30 NOTE — ASSESSMENT & PLAN NOTE
Lab Results   Component Value Date    HGBA1C 6.6 (H) 02/22/2025       Recent Labs     03/30/25  0049 03/30/25  0606 03/30/25  1034   POCGLU 139 102 157*       Blood Sugar Average: Last 72 hrs:  (P) [id number]

## 2025-03-30 NOTE — UTILIZATION REVIEW
NOTIFICATION OF INPATIENT ADMISSION   AUTHORIZATION REQUEST   SERVICING FACILITY:   [facility]  Address: [address] [place] [address]  Tax ID: [id number]  NPI: [id number] ATTENDING PROVIDER:  Attending Name and NPI#: Alex Michael Do [[id number]]  Address: [address] [place] [address]  Phone: [phone]   ADMISSION INFORMATION:  Place of Service: Inpatient [facility]  Place of Service Code: 21  Inpatient Admission Date/Time: 3/29/25 10:59 PM  Discharge Date/Time: No discharge date for patient encounter.  Admitting Diagnosis Code/Description:  Confusion [R41.0]  Abnormal CT scan [R93.89]  Brain mass [G93.89]     UTILIZATION REVIEW CONTACT:  Renan Givens Utilization   Network Utilization Review Department  Phone: [phone]  Fax: [phone]  Email: [email or web address]  Contact for approvals/pending authorizations, clinical reviews, and discharge.     PHYSICIAN ADVISORY SERVICES:  Medical Necessity Denial & Ogsh-lk-Kdyv Review  Phone: [phone]  Fax: [phone]  Email: [email or web address]     DISCHARGE SUPPORT TEAM:  For Patients Discharge Needs & Updates  Phone: [phone] opt. 2 Fax: [phone]  Email: [email or web address]

## 2025-03-30 NOTE — ASSESSMENT & PLAN NOTE
12 mm hypodense lesion within the hepatic lobe possibly representing hemangioma however metastatic lesion could not be excluded although suboptimally evaluated on the current exam.  MRI ordered for further evaluation

## 2025-03-30 NOTE — PROGRESS NOTES
"Progress Note - Hospitalist   Name: Alexis Dennison 64 y.o. male I MRN: [id number]  Unit/Bed#: [facility] 705-01 I Date of Admission: 3/29/2025   Date of Service: 3/30/2025 I Hospital Day: 1    Assessment & Plan  Brain tumor (HCC)  Patient with development of worsening confusion, recently seen at the [facility] ED 3/24/2025 with CT head at that time with finding of brain lesion for which MRI was advised to exclude cancerous etiology.  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  Holding aspirin for this purpose  Biopsy an option if LP nondiagnostic  Steroid treatment on hold for biopsy  Appreciate neurosurgery and oncology recommendations  Liver lesion  12 mm hypodense lesion within the hepatic lobe possibly representing hemangioma however metastatic lesion could not be excluded although suboptimally evaluated on the current exam.  MRI ordered for further evaluation  Type 2 diabetes mellitus with hyperglycemia, without long-term current use of insulin (HCC)  Has been well-controlled as outpatient with hemoglobin A1c of 6.6%  Hold home oral medications, start correctional insulin coverage with Accu-Cheks while admitted  Carb controlled diet  Initiate hypoglycemia protocol  Mixed hyperlipidemia  Chronic and stable, continue PTA statin therapy  HTN, goal below 130/80  Blood pressure thus far acceptable, continue PTA losartan/HCTZ with strict hold parameters " and monitor blood pressure per protocol  Thyroid nodule  Incidental findings on CT scan  Will need outpatient nonemergent thyroid ultrasound for follow-up  Pulmonary nodule  4 mm right lower lobe pulmonary nodule.  3-month follow-up CT chest advised    VTE Pharmacologic Prophylaxis: VTE Score: 5 High Risk (Score >/= 5) - Pharmacological DVT Prophylaxis Contraindicated. Sequential Compression Devices Ordered.    Mobility:   Basic Mobility Inpatient Raw Score: 23  JH-HLM Goal: 7: Walk 25 feet or more  JH-HLM Achieved: 7: Walk 25 feet or more  JH-HLM Goal achieved. Continue to encourage appropriate mobility.    Patient Centered Rounds: I performed bedside rounds with nursing staff today.   Discussions with Specialists or Other Care Team Provider: CM. Neurosurgery. Med Onc, IR.    Education and Discussions with Family / Patient: Updated  (wife, son, and daughter) at bedside.    Current Length of Stay: 1 day(s)  Current Patient Status: Inpatient   Certification Statement: The patient will continue to require additional inpatient hospital stay due to LP,  possible initation of therapy, clinical monitoring, dispo planning  Discharge Plan:  TBD pending clinical progression    Code Status: Level 1 - Full Code    Subjective   Patient seen and examined.  Still intermittently confused.  Otherwise no new complaints.  Afebrile.    Objective :  Temp:  [97.9 °F (36.6 °C)-98.8 °F (37.1 °C)] 98.1 °F (36.7 °C)  HR:  [59-75] 60  BP: (110-130)/(62-74) 110/62  Resp:  [15-18] 16  SpO2:  [93 %-98 %] 98 %  O2 Device: None (Room air)    Body mass index is 27.26 kg/m².     Input and Output Summary (last 24 hours):     Intake/Output Summary (Last 24 hours) at 3/30/2025 1606  Last data filed at 3/30/2025 1049  Gross per 24 hour   Intake 360 ml   Output 3 ml   Net 357 ml       PHYSICAL EXAM:    Vitals signs reviewed  Constitutional   Awake and cooperative. NAD.   Head/Neck   Normocephalic. Atraumatic.   HEENT   No scleral icterus.  EOMI.   Heart   Regular rate and rhythm. No murmurs.   Lungs   Clear to auscultation bilaterally. Respirations unlaboured.   Abdomen   Soft. Nontender. Nondistended.    Skin   Skin color normal. No rashes.   Extremities   No deformities. No peripheral edema.   Neuro   Alert and oriented. No new deficits.   Psych   Mood stable. Affect normal.         Lab Results: I have reviewed the following results:   Results from last 7 days   Lab Units 03/29/25 2019   WBC Thousand/uL 7.36   HEMOGLOBIN g/dL 14.6   HEMATOCRIT % 41.5   PLATELETS Thousands/uL 209   SEGS PCT % 58   LYMPHO PCT % 32   MONO PCT % 7   EOS PCT % 2     Results from last 7 days   Lab Units 03/29/25 2019 03/24/25  2116   SODIUM mmol/L 138 137   POTASSIUM mmol/L 3.9 3.9   CHLORIDE mmol/L 101 98   CO2 mmol/L 30 31   BUN mg/dL 24 19   CREATININE mg/dL 1.11 1.12   ANION GAP mmol/L 7 8   CALCIUM mg/dL 9.6 9.7   ALBUMIN g/dL  --  4.6   TOTAL BILIRUBIN mg/dL  --  0.97   ALK PHOS U/L  --  52   ALT U/L  --  19   AST U/L  --  13   GLUCOSE RANDOM mg/dL 105 124         Results from last 7 days   Lab Units 03/30/25  1554 03/30/25  1034 03/30/25  0606 03/30/25  0049   POC GLUCOSE mg/dl 171* 157* 102 139               Recent Cultures (last 7 days):         Imaging Results Review: I reviewed radiology reports from this admission including: MRI brain.  Other Study Results Review: No additional pertinent studies reviewed.    Last 24 Hours Medication List:     Current Facility-Administered Medications:     acetaminophen (TYLENOL) tablet 650 mg, Q6H PRN    [Held by provider] aspirin (ECOTRIN LOW STRENGTH) EC tablet 81 mg, Daily    atorvastatin (LIPITOR) tablet 20 mg, Daily    Cholecalciferol (VITAMIN D3) tablet 2,000 Units, Daily    cyanocobalamin (VITAMIN B-12) tablet 1,000 mcg, Daily    losartan (COZAAR) tablet 100 mg, Daily **AND** hydroCHLOROthiazide tablet 25 mg, Daily    insulin lispro (HumALOG/ADMELOG) 100 units/mL subcutaneous injection 1-5 Units, HS    insulin lispro  (HumALOG/ADMELOG) 100 units/mL subcutaneous injection 1-6 Units, TID AC **AND** Fingerstick Glucose (POCT), TID AC    ondansetron (ZOFRAN) injection 4 mg, Q6H PRN    pantoprazole (PROTONIX) EC tablet 20 mg, Early Morning    sodium chloride 0.9 % infusion, Continuous, Last Rate: 75 mL/hr (03/30/25 1132)    Administrative Statements   Today, Patient Was Seen By: Alex Michael DO      **Please Note: This note may have been constructed using a voice recognition system.**

## 2025-03-30 NOTE — CASE MANAGEMENT
Case Management Assessment & Discharge Planning Note    Patient name Alexis Dennison  Location [facility] 7[facility]-01 MRN [id number]  : 1960 Date 3/30/2025       Current Admission Date: 3/29/2025  Current Admission Diagnosis:Brain tumor (HCC)   Patient Active Problem List    Diagnosis Date Noted Date Diagnosed    Brain tumor (HCC) 2025     Type 2 diabetes mellitus with hyperglycemia, without long-term current use of insulin (HCC) 2022     HTN, goal below 130/80 2022     Mixed hyperlipidemia 2022     Vitamin D deficiency 2022     NAFLD (nonalcoholic fatty liver disease) 2022     Fatigue 2022       LOS (days): 1  Geometric Mean LOS (GMLOS) (days):   Days to GMLOS:     OBJECTIVE:    Risk of Unplanned Readmission Score: 9.04         Current admission status: Inpatient       Preferred Pharmacy:   [facility]/pharmacy #[id number] - [place], [place] - [address] [address]  [address] [address]  Phone: [phone] Fax: [phone]    [facility] - [place], [place] - [address]  [address]  [facility] [address]  Phone: [phone] Fax: [phone]    Primary Care Provider: PATI Mckeon    Primary Insurance: BLUE CROSS  Secondary Insurance: CAPITAL    ASSESSMENT:  Active Health Care Proxies       [facility] Representative - Daughter   Primary Phone: [phone] (Home)                 Patient Information  Admitted from:: Home  Mental Status: Alert  During Assessment patient was accompanied by: Not accompanied during assessment  Assessment information provided by:: Patient  Primary Caregiver: Self  Support Systems: Self, Family members, Spouse/significant other  County of Residence: [place]  What city do you live in?: [place]  Home entry access options. Select all that apply.: Stairs  Number of steps to enter home.: 3  Type of Current Residence: [address] [place] home  Upon  entering residence, is there a bedroom on the main floor (no further steps)?: No  A bedroom is located on the following floor levels of residence (select all that apply):: 2nd Floor  Upon entering residence, is there a bathroom on the main floor (no further steps)?: Yes  Number of steps to 2nd floor from main floor: One Flight  Living Arrangements: Lives w/ Spouse/significant other  Is patient a ?: No    Activities of Daily Living Prior to Admission  Functional Status: Independent  Completes ADLs independently?: Yes  Ambulates independently?: Yes  Does patient use assisted devices?: No  Does patient currently own DME?: No  Does patient have a history of Outpatient Therapy (PT/OT)?: No  Does the patient have a history of Short-Term Rehab?: No  Does patient have a history of HHC?: No  Does patient currently have HHC?: No    Patient Information Continued  Income Source: Employed  Does patient have prescription coverage?: Yes  Can the patient afford their medications and any related supplies (such as glucometers or test strips)?: Yes  Does patient receive dialysis treatments?: No  Does patient have a history of substance abuse?: No  Does patient have a history of Mental Health Diagnosis?: No    Means of Transportation  Means of Transport to Appts:: Drives Self      DISCHARGE DETAILS:    Discharge planning discussed with:: Patient  Freedom of Choice: Yes  Comments - Freedom of Choice: Discussed FOC  CM contacted family/caregiver?: Yes (spouse at bedside)       Other Referral/Resources/Interventions Provided:  Referral Comments: This CM introduced self and role to patient.  Lives with spouse in 2 story [address] [facility], 1/2 bath on 1st floor, full bathroom and bedroom on 2nd floor.  No DME at home.  No history of STR, HH or OP therapy noted.  Employed, drives

## 2025-03-30 NOTE — ED ATTENDING ATTESTATION
3/29/2025  I, Lonnie Meza MD, saw and evaluated the patient. I have discussed the patient with the resident/non-physician practitioner and agree with the resident's/non-physician practitioner's findings, Plan of Care, and MDM as documented in the resident's/non-physician practitioner's note, except where noted. All available labs and Radiology studies were reviewed.  I was present for key portions of any procedure(s) performed by the resident/non-physician practitioner and I was immediately available to provide assistance.       At this point I agree with the current assessment done in the Emergency Department.  I have conducted an independent evaluation of this patient a history and physical is as follows:  The patient presents for evaluation of increasing confusion and headache the patient recently had a CAT scan of his head as an outpatient on 324 which demonstrated multiple lesions  He had an MRI on 326 which showed mild hydrocephalus    Patient has no fever no vomiting   Patient's been somewhat slower to respond according to family he has been more forgetful  No focal weakness    Plan: Admit  CT chest abdomen pelvis  Rescan CT of brain rule out a bleed  Patient declines pain medication  ED Course         Critical Care Time  Procedures

## 2025-03-30 NOTE — PLAN OF CARE
Problem: PAIN - ADULT  Goal: Verbalizes/displays adequate comfort level or baseline comfort level  Description: Interventions:- Encourage patient to monitor pain and request assistance- Assess pain using appropriate pain scale- Administer analgesics based on type and severity of pain and evaluate response- Implement non-pharmacological measures as appropriate and evaluate response- Consider cultural and social influences on pain and pain management- Notify physician/advanced practitioner if interventions unsuccessful or patient reports new pain  Outcome: Progressing     Problem: INFECTION - ADULT  Goal: Absence or prevention of progression during hospitalization  Description: INTERVENTIONS:- Assess and monitor for signs and symptoms of infection- Monitor lab/diagnostic results- Monitor all insertion sites, i.e. indwelling lines, tubes, and drains- Monitor endotracheal if appropriate and nasal secretions for changes in amount and color- [place] appropriate cooling/warming therapies per order- Administer medications as ordered- Instruct and encourage patient and family to use good hand hygiene technique- Identify and instruct in appropriate isolation precautions for identified infection/condition  Outcome: Progressing  Goal: Absence of fever/infection during neutropenic period  Description: INTERVENTIONS:- Monitor WBC  Outcome: Progressing     Problem: SAFETY ADULT  Goal: Patient will remain free of falls  Description: INTERVENTIONS:- Educate patient/family on patient safety including physical limitations- Instruct patient to call for assistance with activity - Consult OT/PT to assist with strengthening/mobility - Keep Call bell within reach- Keep bed low and locked with side rails adjusted as appropriate- Keep care items and personal belongings within reach- Initiate and maintain comfort rounds- Make Fall Risk Sign visible to staff- Offer Toileting every  Hours, in advance of need- Initiate/Maintain alarm- Obtain  necessary fall risk management equipment: - Apply yellow socks and bracelet for high fall risk patients- Consider moving patient to room near nurses station  Outcome: Progressing  Goal: Maintain or return to baseline ADL function  Description: INTERVENTIONS:-  Assess patient's ability to carry out ADLs; assess patient's baseline for ADL function and identify physical deficits which impact ability to perform ADLs (bathing, care of mouth/teeth, toileting, grooming, dressing, etc.)- Assess/evaluate cause of self-care deficits - Assess range of motion- Assess patient's mobility; develop plan if impaired- Assess patient's need for assistive devices and provide as appropriate- Encourage maximum independence but intervene and supervise when necessary- Involve family in performance of ADLs- Assess for home care needs following discharge - Consider OT consult to assist with ADL evaluation and planning for discharge- Provide patient education as appropriate  Outcome: Progressing  Goal: Maintains/Returns to pre admission functional level  Description: INTERVENTIONS:- Perform AM-PAC 6 Click Basic Mobility/ Daily Activity assessment daily.- Set and communicate daily mobility goal to care team and patient/family/caregiver. - Collaborate with rehabilitation services on mobility goals if consulted- Perform Range of Motion  times a day.- Reposition patient every  hours.- Dangle patient  times a day- Stand patient  times a day- Ambulate patient  times a day- Out of bed to chair  times a day - Out of bed for meals  times a day- Out of bed for toileting- Record patient progress and toleration of activity level   Outcome: Progressing     Problem: DISCHARGE PLANNING  Goal: Discharge to home or other facility with appropriate resources  Description: INTERVENTIONS:- Identify barriers to discharge w/patient and caregiver- Arrange for needed discharge resources and transportation as appropriate- Identify discharge learning needs (meds, wound  care, etc.)- Arrange for interpretive services to assist at discharge as needed- Refer to Case Management Department for coordinating discharge planning if the patient needs post-hospital services based on physician/advanced practitioner order or complex needs related to functional status, cognitive ability, or social support system  Outcome: Progressing     Problem: NEUROSENSORY - ADULT  Goal: Achieves stable or improved neurological status  Description: INTERVENTIONS- Monitor and report changes in neurological status- Monitor vital signs such as temperature, blood pressure, glucose, and any other labs ordered - Initiate measures to prevent increased intracranial pressure- Monitor for seizure activity and implement precautions if appropriate    Outcome: Progressing  Goal: Remains free of injury related to seizures activity  Description: INTERVENTIONS- Maintain airway, patient safety  and administer oxygen as ordered- Monitor patient for seizure activity, document and report duration and description of seizure to physician/advanced practitioner- If seizure occurs,  ensure patient safety during seizure- Reorient patient post seizure- Seizure pads on all 4 side rails- Instruct patient/family to notify RN of any seizure activity including if an aura is experienced- Instruct patient/family to call for assistance with activity based on nursing assessment- Administer anti-seizure medications if ordered  Outcome: Progressing  Goal: Achieves maximal functionality and self care  Description: INTERVENTIONS- Monitor swallowing and airway patency with patient fatigue and changes in neurological status- Encourage and assist patient to increase activity and self care. - Encourage visually impaired, hearing impaired and aphasic patients to use assistive/communication devices  Outcome: Progressing     Problem: CARDIOVASCULAR - ADULT  Goal: Maintains optimal cardiac output and hemodynamic stability  Description: INTERVENTIONS:-  Monitor I/O, vital signs and rhythm- Monitor for S/S and trends of decreased cardiac output- Administer and titrate ordered vasoactive medications to optimize hemodynamic stability- Assess quality of pulses, skin color and temperature- Assess for signs of decreased coronary artery perfusion- Instruct patient to report change in severity of symptoms  Outcome: Progressing  Goal: Absence of cardiac dysrhythmias or at baseline rhythm  Description: INTERVENTIONS:- Continuous cardiac monitoring, vital signs, obtain 12 lead EKG if ordered- Administer antiarrhythmic and heart rate control medications as ordered- Monitor electrolytes and administer replacement therapy as ordered  Outcome: Progressing     Problem: RESPIRATORY - ADULT  Goal: Achieves optimal ventilation and oxygenation  Description: INTERVENTIONS:- Assess for changes in respiratory status- Assess for changes in mentation and behavior- Position to facilitate oxygenation and minimize respiratory effort- Oxygen administered by appropriate delivery if ordered- Initiate smoking cessation education as indicated- Encourage broncho-pulmonary hygiene including cough, deep breathe, Incentive Spirometry- Assess the need for suctioning and aspirate as needed- Assess and instruct to report SOB or any respiratory difficulty- Respiratory Therapy support as indicated  Outcome: Progressing     Problem: GASTROINTESTINAL - ADULT  Goal: Minimal or absence of nausea and/or vomiting  Description: INTERVENTIONS:- Administer IV fluids if ordered to ensure adequate hydration- Maintain NPO status until nausea and vomiting are resolved- Nasogastric tube if ordered- Administer ordered antiemetic medications as needed- Provide nonpharmacologic comfort measures as appropriate- Advance diet as tolerated, if ordered- Consider nutrition services referral to assist patient with adequate nutrition and appropriate food choices  Outcome: Progressing  Goal: Maintains or returns to baseline bowel  function  Description: INTERVENTIONS:- Assess bowel function- Encourage oral fluids to ensure adequate hydration- Administer IV fluids if ordered to ensure adequate hydration- Administer ordered medications as needed- Encourage mobilization and activity- Consider nutritional services referral to assist patient with adequate nutrition and appropriate food choices  Outcome: Progressing  Goal: Maintains adequate nutritional intake  Description: INTERVENTIONS:- Monitor percentage of each meal consumed- Identify factors contributing to decreased intake, treat as appropriate- Assist with meals as needed- Monitor I&O, weight, and lab values if indicated- Obtain nutrition services referral as needed  Outcome: Progressing  Goal: Establish and maintain optimal ostomy function  Description: INTERVENTIONS:- Assess bowel function- Encourage oral fluids to ensure adequate hydration- Administer IV fluids if ordered to ensure adequate hydration - Administer ordered medications as needed- Encourage mobilization and activity- Nutrition services referral to assist patient with appropriate food choices- Assess stoma site- Consider wound care consult   Outcome: Progressing  Goal: Oral mucous membranes remain intact  Description: INTERVENTIONS- Assess oral mucosa and hygiene practices- Implement preventative oral hygiene regimen- Implement oral medicated treatments as ordered- Initiate Nutrition services referral as needed  Outcome: Progressing     Problem: GENITOURINARY - ADULT  Goal: Maintains or returns to baseline urinary function  Description: INTERVENTIONS:- Assess urinary function- Encourage oral fluids to ensure adequate hydration if ordered- Administer IV fluids as ordered to ensure adequate hydration- Administer ordered medications as needed- Offer frequent toileting- Follow urinary retention protocol if ordered  Outcome: Progressing  Goal: Absence of urinary retention  Description: INTERVENTIONS:- Assess patient’s ability to  void and empty bladder- Monitor I/O- Bladder scan as needed- Discuss with physician/AP medications to alleviate retention as needed- Discuss catheterization for long term situations as appropriate  Outcome: Progressing  Goal: Urinary catheter remains patent  Description: INTERVENTIONS:- Assess patency of urinary catheter- If patient has a chronic connell, consider changing catheter if non-functioning- Follow guidelines for intermittent irrigation of non-functioning urinary catheter  Outcome: Progressing     Problem: METABOLIC, FLUID AND ELECTROLYTES - ADULT  Goal: Electrolytes maintained within normal limits  Description: INTERVENTIONS:- Monitor labs and assess patient for signs and symptoms of electrolyte imbalances- Administer electrolyte replacement as ordered- Monitor response to electrolyte replacements, including repeat lab results as appropriate- Instruct patient on fluid and nutrition as appropriate  Outcome: Progressing  Goal: Fluid balance maintained  Description: INTERVENTIONS:- Monitor labs - Monitor I/O and WT- Instruct patient on fluid and nutrition as appropriate- Assess for signs & symptoms of volume excess or deficit  Outcome: Progressing  Goal: Glucose maintained within target range  Description: INTERVENTIONS:- Monitor Blood Glucose as ordered- Assess for signs and symptoms of hyperglycemia and hypoglycemia- Administer ordered medications to maintain glucose within target range- Assess nutritional intake and initiate nutrition service referral as needed  Outcome: Progressing

## 2025-03-30 NOTE — H&P
"H&P - Hospitalist   Name: Alexis Dennison 64 y.o. male I MRN: [id number]  Unit/Bed#: [facility] 705-01 I Date of Admission: 3/29/2025   Date of Service: 3/30/2025 I Hospital Day: 1     Assessment & Plan  Brain mass  Patient with development of worsening confusion, recently seen at the [facility] ED 3/24/2025 with CT head at that time with finding of brain lesion for which MRI was advised to exclude cancerous etiology.  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  CTA chest/abdomen/pelvis with findings as detailed below  Appreciate neurosurgery and oncology evaluations  Monitor neurologic status closely  Abnormal CT scan  CTA chest/abdomen/pelvis with findings as below:  4 mm right lower lobe pulmonary nodule for which 3-month follow-up chest CT was advised  Nonspecific 12mm hypodense lesion within the hepatic lobe possibly representing hemangioma however metastatic lesion could not be excluded although suboptimally evaluated on the current exam.  Will obtain MRI of abdomen to better clarify  Heterogenous nodular enhancement of left thyroid lobe for which nonemergent thyroid ultrasound was advised.  Await oncology input for all of above but likely thyroid ultrasound can be obtained as outpatient  Type 2 diabetes mellitus with hyperglycemia, without long-term current use of insulin (HCC)    Lab Results   Component Value " Date    HGBA1C 6.6 (H) 02/22/2025   Has been well-controlled as outpatient  Hold home oral medications, start correctional insulin coverage with Accu-Cheks while admitted  Carb controlled diet  Initiate hypoglycemia protocol  Mixed hyperlipidemia  Chronic and stable, continue PTA statin therapy  HTN, goal below 130/80  Blood pressure thus far acceptable, continue PTA losartan/HCTZ with strict hold parameters and monitor blood pressure per protocol      VTE Prophylaxis: Pharmacologic VTE Prophylaxis contraindicated due to pending neurosurgical evaluation   / sequential compression device   Code Status: Level 1 - Full Code   POLST: POLST form is not discussed and not completed at this time.  Discussion with family: Son in law at bedside    Anticipated Length of Stay:  Patient will be admitted on an Inpatient basis with an anticipated length of stay of greater than 2 midnights.   Justification for Hospital Stay: Please see detailed plans noted above.    Chief Complaint:     Worsening confusion in setting of recently discovered brain mass  History of Present Illness:  Alexis Dennison is a 64 y.o. male who presents for evaluation of worsening confusion in the setting of recently discovered brain mass.  For the past several months he was noted by family with increasing confusion particularly being confused to current events, occasionally names of family members, and with forgetfulness regarding use of objects particularly phone.  He was worked up for this by PCP apparently with initial unrevealing workup however was seen at the [facility] ED 3/24/2025 where a CT head was obtained with finding of brain lesion for which MRI was advised to exclude cancerous etiology.  He had subsequent follow-up with his PCP who arranged for outpatient MRI brain with findings concerning for potential malignancy as detailed below.  He was arranged for expedited outpatient neurosurgical evaluation, however in the interim he was noted by family with  progression of his confusion and apparent development of double vision for which he was brought here for further evaluation.  Patient himself denied any fever/chills, headaches, focal weakness, numbness/tingling/paresthesias, weight loss, or other systemic symptoms; son-in-law at bedside states he does not believe that patient has had any apparent seizure-like activity.    During ED evaluation basic labs were obtained and noted without abnormality, CT head was repeated appearing stable compared to prior imaging, and CTA chest/abdomen/pelvis was found with findings as detailed.  Given worsening of his confusion/dermatology in setting of brain mass he is admitted with plans for neurosurgery and oncology consultations.    Review of Systems:    Constitutional:  Denies fever or chills   Eyes:  Denies change in visual acuity but reported double vision  HENT:  Denies nasal congestion or sore throat   Respiratory:  Denies cough or shortness of breath   Cardiovascular:  Denies chest pain or edema   GI:  Denies abdominal pain, nausea, vomiting, bloody stools or diarrhea   :  Denies dysuria   Musculoskeletal:  Denies back pain or joint pain   Integument:  Denies rash   Neurologic:  Denies headache, focal weakness or sensory changes   Endocrine:  Denies polyuria or polydipsia   Lymphatic:  Denies swollen glands   Psychiatric:  Denies depression or anxiety but confusion reported    Past Medical and Surgical History:   Past Medical History:   Diagnosis Date    Colon polyp     Diabetes mellitus (HCC)     GERD (gastroesophageal reflux disease)     Hyperlipidemia     Hypertension     Liver disease     Sleep apnea      Past Surgical History:   Procedure Laterality Date    COLONOSCOPY      CYST REMOVAL      back    WV EXC B9 LESION MRGN XCP SK TG T/A/L 0.6-1.0 CM N/A 6/7/2023    Procedure: EXCISION  BIOPSY LESION/MASS ABDOMINAL/CHEST WALL;  Surgeon: Trevor Villavicencio MD;  Location:  MAIN OR;  Service: General  "      Meds/Allergies:    Medications Prior to Admission:     aspirin (ECOTRIN LOW STRENGTH) 81 mg EC tablet    atorvastatin (LIPITOR) 20 mg tablet    Cholecalciferol (Vitamin D) 50 MCG (2000 UT) CAPS    Cyanocobalamin (Vitamin B 12) 500 MCG TABS    losartan-hydrochlorothiazide (HYZAAR) 100-25 MG per tablet    metFORMIN (GLUCOPHAGE-XR) 500 mg 24 hr tablet    omeprazole (PriLOSEC) 20 mg delayed release capsule    SITagliptin-metFORMIN HCl ER (Janumet XR)  MG TB24    vitamin B-12 (VITAMIN B-12) 1,000 mcg tablet    Allergies: No Known Allergies  History:  Marital Status: /Civil Union     Substance Use History:   Social History     Substance and Sexual Activity   Alcohol Use Yes    Alcohol/week: 1.0 standard drink of alcohol    Types: 1 Cans of beer per week    Comment: socially     Social History     Tobacco Use   Smoking Status Never   Smokeless Tobacco Never     Social History     Substance and Sexual Activity   Drug Use Never       Family History:  Family History   Problem Relation Age of Onset    Cancer Mother     Cancer Father     Diabetes Paternal Grandfather     Diabetes unspecified Paternal Grandfather         Type 2    Colon polyps Neg Hx     Colon cancer Neg Hx        Physical Exam:     Vitals:   Blood Pressure: 115/68 (03/29/25 2356)  Pulse: 66 (03/29/25 2312)  Temperature: 98.7 °F (37.1 °C) (03/29/25 2356)  Temp Source: Oral (03/29/25 1828)  Respirations: 16 (03/29/25 2312)  Height: 5' 10\" (177.8 cm) (03/29/25 1828)  Weight - Scale: 86.2 kg (190 lb) (03/29/25 1828)  SpO2: 94 % (03/29/25 2312)    Constitutional:  Well developed, well nourished, no acute distress, non-toxic appearance   Eyes:  PERRL, conjunctiva normal   HENT:  Atraumatic, external ears normal, nose normal, oropharynx moist, no pharyngeal exudates. Neck- normal range of motion, no tenderness, supple   Respiratory:  No respiratory distress, normal breath sounds, no rales, no wheezing   Cardiovascular:  Normal rate, normal rhythm, " no murmurs, no gallops, no rubs   GI:  Soft, nondistended, normal bowel sounds, nontender, no organomegaly, no mass, no rebound, no guarding   :  No costovertebral angle tenderness   Musculoskeletal:  No edema, no tenderness, no deformities. Back- no tenderness  Integument:  Well hydrated, no rash   Lymphatic:  No lymphadenopathy noted   Neurologic:  Alert &awake, communicative although with somewhat delayed responses, CN 2-12 normal, normal motor function, normal sensory function, no focal deficits noted   Psychiatric:  Speech and behavior appropriate, oriented to person, place, month but not year, and president      Lab Results: I have reviewed laboratory reports/results from this admission    Results from last 7 days   Lab Units 03/29/25 2019   WBC Thousand/uL 7.36   HEMOGLOBIN g/dL 14.6   HEMATOCRIT % 41.5   PLATELETS Thousands/uL 209   SEGS PCT % 58   LYMPHO PCT % 32   MONO PCT % 7   EOS PCT % 2     Results from last 7 days   Lab Units 03/29/25 2019 03/24/25  2116   SODIUM mmol/L 138 137   POTASSIUM mmol/L 3.9 3.9   CHLORIDE mmol/L 101 98   CO2 mmol/L 30 31   BUN mg/dL 24 19   CREATININE mg/dL 1.11 1.12   ANION GAP mmol/L 7 8   CALCIUM mg/dL 9.6 9.7   ALBUMIN g/dL  --  4.6   TOTAL BILIRUBIN mg/dL  --  0.97   ALK PHOS U/L  --  52   ALT U/L  --  19   AST U/L  --  13   GLUCOSE RANDOM mg/dL 105 124                       EKG: Personally reviewed, normal sinus rhythm HR 67    Imaging: Results Review Statement: I reviewed radiology reports from this admission including: CT chest, CT abdomen/pelvis, CT head, and MRI brain.    CT head wo contrast  Result Date: 3/29/2025  Narrative: CT BRAIN - WITHOUT CONTRAST INDICATION:   Known mass, worsening AMS, r/o bleed. COMPARISON: 3/24/2025 TECHNIQUE:  CT examination of the brain was performed.  Multiplanar 2D reformatted images were created from the source data. Radiation dose length product (DLP) for this visit:  914.52 mGy-cm .  This examination, like all CT scans  performed in the [facility], was performed utilizing techniques to minimize radiation dose exposure, including the use of iterative  reconstruction and automated exposure control. IMAGE QUALITY:  Diagnostic. FINDINGS: PARENCHYMA: 2.0 x 1.1 x 1.0 cm hyperdense lobulated lesion adjacent to the foramen of De Oliveira region is again visualized without interval change. Subtle subependymoma nodularity along the left lateral ventricle are again seen without interval change. Associated asymmetrical dilatation of the left lateral ventricle compared to the right, similar compared to the prior study. No new/additional intracranial mass lesions or fluid collections noted. Stable mild left to right midline shift at the septum pellucidum level. No transtentorial herniation. No acute intracranial hemorrhage or extra-axial/subdural hemorrhage. No CT signs of acute large vascular territory cerebral infarction. Gray-white differentiation is preserved. VENTRICLES AND EXTRA-AXIAL SPACES: Mild symmetrical dilatation of the left lateral ventricle without interval change. Remaining ventricles, cerebral sulci and cisterns are normal in size and configuration for age.. VISUALIZED ORBITS:  Normal visualized orbits. PARANASAL SINUSES: Mild mucoperiosteal thickening the bilateral inferior maxillary sinuses. The remaining paranasal sinuses and mastoid air cells are well aerated and clear without air-fluid levels. CALVARIUM AND EXTRACRANIAL SOFT TISSUES: Bony calvarium and temporomandibular joints are intact.     Impression: Stable subependymal nodules and left foramen of De Oliveira region hyperdense lesion again seen with associated mild symmetrical dilatation of the left lateral ventricle and minimal left to right midline shift. The overall findings are unchanged compared to the prior study. No acute intracranial hemorrhage or evidence of acute cerebral infarction. Workstation performed: [id number]     CTA chest abdomen pelvis w wo  contrast  Result Date: 3/29/2025  Narrative: CTA - CHEST, ABDOMEN AND PELVIS - WITHOUT AND WITH IV CONTRAST INDICATION: Altered mental status. Brain mass. Assess for metastatic disease. COMPARISON: Ultrasound 7/24/2022 and 7/10/2021 TECHNIQUE: CT examination of the chest, abdomen and pelvis was performed both prior to and after the administration of intravenous contrast. The noncontrast portion of this examination was performed utilizing low radiation dose technique. Thin section angiographic arterial phase post contrast technique was used in order to evaluate for aortic dissection. 3D reformatted images and volume rendering were performed on an independent workstation. Additionally, axial, sagittal, and coronal 2D reformatted images were created from the source data and submitted for interpretation. Radiation dose length product (DLP) for this visit: 2257.86 mGy-cm . This examination, like all CT scans performed in the [facility], was performed utilizing techniques to minimize radiation dose exposure, including the use of iterative reconstruction and automated exposure control. IV Contrast: 100 mL of iohexol Enteric Contrast: Not administered. FINDINGS: AORTA: No aortic dissection or intramural hematoma. No aortic aneurysm. No significant atherosclerotic disease. Specifically, no flow limiting atherosclerotic stenosis of aorta or major aortic branch vessel in the chest, abdomen or pelvis. Codominant, patent left renal arteries. CHEST LUNGS: No lobar consolidation, diffuse interstitial lung disease or endobronchial lesion. There is a 4 mm juxtapleural nodule within the right lower lobe (7/100). PLEURA: Unremarkable. HEART/PULMONARY ARTERIAL TREE: The heart is not grossly enlarged, with mild to moderate LAD coronary artery calcifications. The pericardium is unremarkable. MEDIASTINUM AND PASQUALE: Unremarkable. CHEST WALL AND LOWER NECK: There is heterogeneous nodular enlargement of the left thyroid lobe,  extending into the superior mediastinum. This measures at least 4 cm in diameter. ABDOMEN LIVER/BILIARY TREE: There is diffuse low-attenuation throughout the mildly enlarged liver. There is a slightly ill-defined 12 mm hypodense lesion within segment 6 (6/88). No suspicious biliary dilation. GALLBLADDER: No calcified gallstones. No pericholecystic inflammatory change. SPLEEN: Unremarkable. PANCREAS: Unremarkable. ADRENAL GLANDS: Unremarkable. KIDNEYS/URETERS: Unremarkable. No hydronephrosis. STOMACH AND BOWEL: Unremarkable. APPENDIX: No findings to suggest appendicitis. ABDOMINOPELVIC CAVITY: No ascites. No pneumoperitoneum. No lymphadenopathy. PELVIS REPRODUCTIVE ORGANS: Unremarkable for patient's age. URINARY BLADDER: Unremarkable. ABDOMINAL WALL/INGUINAL REGIONS: Small fat-containing left inguinal hernia. BONES: No acute fracture or suspicious osseous lesion.     Impression: 1.  There is a nonspecific 12 mm hypodense lesion within the right hepatic lobe. While this may represent a hemangioma, a metastatic lesion can not be excluded. This is suboptimally evaluated on the current exam, due to arterial timing of the contrast bolus and background diffuse hepatic steatosis. Further evaluation by gadolinium enhanced MRI of the abdomen is recommended. 2.  Nonspecific 4 mm right lower lobe pulmonary nodule. In the setting of a known malignancy, a 3-month follow-up chest CT could be performed for further evaluation. 3.  Heterogeneous nodular enlargement of the left thyroid lobe. A nonemergent thyroid ultrasound follow-up is recommended. 4.  Please refer to the report body for description of other incidental, chronic and/or benign findings. The study was marked in EPIC for significant notification. Workstation performed: [id number]     MRI brain w wo contrast  Result Date: 3/26/2025  Narrative: MRI BRAIN WITH AND WITHOUT CONTRAST INDICATION: G93.9: Disorder of brain, unspecified I51.7: Cardiomegaly R41.3: Other amnesia R42:  Dizziness and giddiness R41.0: Disorientation, unspecified. COMPARISON: CTA head and neck with and without contrast 3/24/2025. TECHNIQUE: Multiplanar, multisequence imaging of the brain was performed before and after gadolinium administration. IV Contrast:  9 mL of Gadobutrol injection IMAGE QUALITY:   Diagnostic. FINDINGS: BRAIN PARENCHYMA AND VENTRICLES: Scattered nodular areas of enhancement involving left anterior inferior basal ganglia involving the left anterior commissure. Multiple scattered foci of subependymal nodular enhancement throughout the ventricles (left worse than right) with associated restricted diffusion and mild hydrocephalus (left worse than right). Smaller foci of nodular enhancement along anteromedial aspect of left cerebral peduncle and left quirino. No mass effect or midline shift. No acute intracranial hemorrhage. There are no white matter changes in the cerebral hemispheres. SELLA AND PITUITARY GLAND:  Normal. ORBITS:  Normal. PARANASAL SINUSES:  Normal. VASCULATURE:  Evaluation of the major intracranial vasculature demonstrates appropriate flow voids. CALVARIUM AND SKULL BASE:  Normal. EXTRACRANIAL SOFT TISSUES:  Normal.     Impression: Multiple scattered foci of subependymal nodular enhancement throughout ventricles (left worse than right) with mild hydrocephalus (left worse than right), scattered nodular areas of enhancement in left anterior inferior basal ganglia involving left anterior commissure, and smaller foci of nodular enhancement along anteromedial aspect of left cerebral peduncle and left quirino. Differential includes lymphoma, metastasis, multicentric high-grade glioma with subependymal spread of tumor, among other differentials. Recommend neurosurgical consultation for further evaluation. Consider correlation with CSF analysis. The study was marked in EPIC for immediate notification. Workstation performed: [id number]     CTA head and neck with and without contrast  Result Date:  3/24/2025  Narrative: CTA NECK AND BRAIN WITH AND WITHOUT CONTRAST INDICATION: confusion, headaches COMPARISON:   None. TECHNIQUE:  Routine CT imaging of the Brain without contrast.Post contrast imaging was performed after administration of iodinated contrast through the neck and brain. Post contrast axial 0.625 mm images timed to opacify the arterial system.  3D rendering was performed on an independent workstation.   MIP reconstructions performed. Coronal and sagittal reconstructions were performed of the non contrast portion of the brain. Radiation dose length product (DLP) for this visit:  1220 mGy-cm .  This examination, like all CT scans performed in the [facility], was performed utilizing techniques to minimize radiation dose exposure, including the use of iterative reconstruction and automated exposure control. IV Contrast:  85 mL of iohexol IMAGE QUALITY:   Diagnostic FINDINGS: NONCONTRAST BRAIN PARENCHYMA: There is a lobulated hyperdense lesion noted adjacent to the foramen of De Oliveira measuring 2.0 x 1.1 x 1.0 cm (transverse, AP, CC). There is a subtle area of subtle ependymal nodularity noted at the left lateral ventricle, series 2 image 27. A focus of subependymal nodularity measuring 7 mm is noted at the atria of the left lateral ventricle, series 2 image 21. There is no mass effect or midline shift. No CT signs of acute infarction.  No acute parenchymal hemorrhage. VENTRICLES AND EXTRA-AXIAL SPACES: There is mild asymmetric prominence of the left lateral ventricle when compared to the right. No prior studies are available for comparison. VISUALIZED ORBITS: Normal. PARANASAL SINUSES: Normal. CTA NECK ARCH AND GREAT VESSELS: Visualized arch and great vessels are normal. VERTEBRAL ARTERIES: Patent extracranial segments. RIGHT CAROTID: There is mild atherosclerotic plaquing at the carotid bulb. No stenosis.    No dissection. LEFT CAROTID: No stenosis.    No dissection. NASCET criteria was  used to determine the degree of internal carotid artery diameter stenosis. CTA BRAIN: INTERNAL CAROTID ARTERIES: No stenosis or occlusion. ANTERIOR CEREBRAL ARTERY CIRCULATION:  No stenosis or occlusion. MIDDLE CEREBRAL ARTERY CIRCULATION:  No stenosis or occlusion. DISTAL VERTEBRAL ARTERIES:  No stenosis or occlusion. BASILAR ARTERY:  No stenosis or occlusion. POSTERIOR CEREBRAL ARTERIES: No stenosis or occlusion. VENOUS STRUCTURES:  Normal. NON VASCULAR ANATOMY BONY STRUCTURES:  No acute osseous abnormality. There are multilevel spondylotic degenerative changes of the cervical spine. SOFT TISSUES OF THE NECK: There is a heterogeneous solid and cystic left thyroid lobe mass measuring 4 cm. Incidental discovery of one or more thyroid nodule(s) measuring more than 1.5 cm and without suspicious features is noted in this patient who is above 35 years old; according to guidelines published in the February 2015 white paper on incidental thyroid nodules in the Journal of the American College of Radiology (JACR), further characterization with thyroid ultrasound is recommended. THORACIC INLET:  Unremarkable.     Impression: CT Brain:  No acute intracranial abnormality. 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 No prior studies are available for comparison. Further evaluation with MRI brain with and without contrast is  recommended. There is also mild asymmetric prominence of the left lateral ventricle when compared to the right which may be developmental nature and can be further evaluated at the time of the MRI brain. CT Angiography: No focal stenosis or saccular aneurysm within the [place]. No hemodynamically significant stenosis within either common or internal carotid artery. Less than 50% stenosis by NASCET criteria. Incidental thyroid nodule(s) for which nonemergent thyroid ultrasound is recommended.  Workstation performed: [id number]     VAS carotid complete study  Result Date: 3/14/2025  Narrative:  [facility] REPORT CLINICAL: Indications:  Patient presents with recent episodes of dizziness / near syncope with looking upward that resolves with neutral head position. Operative History: no reported cardiovascular surgeries Risk Factors The patient has history of HTN, Diabetes (NIDDM (oral meds)) and Hyperlipidemia. Clinical Right Pressure:  111/72 mm Hg, Left Pressure:  109/72 mm Hg.  FINDINGS:  Right        Impression  PSV  EDV (cm/s)  Direction of Flow  Ratio  Dist. ICA                 63          21                      0.78  Mid. ICA                  65          11                      0.80  Prox. ICA    1 - 49%      69          14                      0.85  Dist CCA                  68          13                            Mid CCA                   81          17                      1.16  Prox CCA                  70          16                            Ext Carotid               88           9                      1.09  Prox Vert                 34           7  Antegrade                 Subclavian               127           0                             Left         Impression  PSV  EDV (cm/s)  Direction of Flow  Ratio  Dist. ICA                 51          17                      0.50  Mid. ICA                  39          14                      0.38  Prox. ICA    1 - 49%      46          10                      0.45  Dist CCA                  69          13                            Mid CCA                  102          20                      0.88  Prox CCA                 116          20                            Ext Carotid               81           9                      0.79  Prox Vert                 41          13  Antegrade                 Subclavian               125           0                               CONCLUSION:  Impression RIGHT: There is <50% stenosis noted in the  internal carotid artery. Plaque is heterogenous and irregular. Vertebral artery flow is antegrade. There is no significant subclavian artery disease.  LEFT: There is <50% stenosis noted in the internal carotid artery. Plaque is homogenous and smooth. Vertebral artery flow is antegrade. There is no significant subclavian artery disease.  There is no previous study for comparison.  SIGNATURE: Electronically Signed by: SHANNA ELKINS MD on 2025-03-14 05:41:12 PM        ** Please Note: Dragon 360 Dictation voice to text software was used in the creation of this document. **

## 2025-03-30 NOTE — ASSESSMENT & PLAN NOTE
Blood pressure thus far acceptable, continue PTA losartan/HCTZ with strict hold parameters and monitor blood pressure per protocol

## 2025-03-30 NOTE — CONSULTS
Consultation - Neurosurgery   Name: Alexis Dennison 64 y.o. male I MRN: [id number]  Unit/Bed#: [facility] 705-01 I Date of Admission: 3/29/2025   Date of Service: 3/30/2025 I Hospital Day: 1   Inpatient consult to Neurosurgery  Consult performed by: Gabriele Fair MD  Consult ordered by: Edwin Herrera DO        Physician Requesting Evaluation: Alex Michael, *   Reason for Evaluation / Principal Problem: brain mass    Assessment & Plan  Brain tumor (HCC)  Multifocal process on MRI brain  MRI Brain 3/26/25: Multiple scattered foci of subependymal nodular enhancement throughout ventricles (left worse than right) with mild hydrocephalus (left worse than right), scattered nodular areas of enhancement in left anterior inferior basal ganglia involving left   anterior commissure, and smaller foci of nodular enhancement along anteromedial aspect of left cerebral peduncle and left quirino. Differential includes lymphoma, metastasis, multicentric high-grade glioma with subependymal spread of tumor, among other differentials. Recommend neurosurgical consultation for further evaluation. Consider correlation with CSF analysis.  Suggestive of CNS lymphoma, but agree with wide DDx in MRI report  Would attempt LP with cytology to achieve diagnosis - from there therapy would be IV with med onc - suggest holding 81 mg ASA for that intervention  If LP unsuccessful for diagnosis, can consider stereotactic needle biopsy  If able, would hold off on steroid management until Dx achieved  Type 2 diabetes mellitus with hyperglycemia, without long-term current use of insulin (HCC)  Lab Results   Component Value Date    HGBA1C 6.6 (H) 02/22/2025       Recent Labs     03/30/25  0049 03/30/25  0606 03/30/25  1034   POCGLU 139 102 157*       Blood Sugar Average: Last 72 hrs:  (P) [id number]    I have discussed the above management plan in detail with the primary service. I also discussed this with patient, his wife, and his  daughter, as well as his son (an IR tech, by phone). They agree with plan  Neurosurgery service will follow , see patient/family again when LP results available.   Please contact the SecureChat role for the Neurosurgery service with any questions/concerns.    History of Present Illness   HPI: Alexis Dennison is a 64 y.o. year old male who presents with worsening confusion.Was at [facility] ER 3/24/25 with CT head, and referred for MRI brain as outpatient. Was to see NSx outpatient, but with worsening confusion, presented to ER again.     Review of Systems  Historical Information   Past Medical History:   Diagnosis Date    Colon polyp     Diabetes mellitus (HCC)     GERD (gastroesophageal reflux disease)     Hyperlipidemia     Hypertension     Liver disease     Sleep apnea      Past Surgical History:   Procedure Laterality Date    COLONOSCOPY      CYST REMOVAL      back    VA EXC B9 LESION MRGN XCP SK TG T/A/L 0.6-1.0 CM N/A 6/7/2023    Procedure: EXCISION  BIOPSY LESION/MASS ABDOMINAL/CHEST WALL;  Surgeon: Trevor Villavicencio MD;  Location:  MAIN OR;  Service: General     Social History     Tobacco Use    Smoking status: Never    Smokeless tobacco: Never   Vaping Use    Vaping status: Never Used   Substance and Sexual Activity    Alcohol use: Yes     Alcohol/week: 1.0 standard drink of alcohol     Types: 1 Cans of beer per week     Comment: socially    Drug use: Never    Sexual activity: Not Currently     Partners: Female     E-Cigarette/Vaping    E-Cigarette Use Never User      E-Cigarette/Vaping Substances    Nicotine No     THC No     CBD No     Flavoring No     Other No     Unknown No        Social History     Tobacco Use    Smoking status: Never    Smokeless tobacco: Never   Vaping Use    Vaping status: Never Used   Substance and Sexual Activity    Alcohol use: Yes     Alcohol/week: 1.0 standard drink of alcohol     Types: 1 Cans of beer per week     Comment: socially    Drug use: Never    Sexual activity: Not Currently      Partners: Female       Current Facility-Administered Medications:     acetaminophen (TYLENOL) tablet 650 mg, Q6H PRN    [Held by provider] aspirin (ECOTRIN LOW STRENGTH) EC tablet 81 mg, Daily    atorvastatin (LIPITOR) tablet 20 mg, Daily    Cholecalciferol (VITAMIN D3) tablet 2,000 Units, Daily    cyanocobalamin (VITAMIN B-12) tablet 1,000 mcg, Daily    losartan (COZAAR) tablet 100 mg, Daily **AND** hydroCHLOROthiazide tablet 25 mg, Daily    insulin lispro (HumALOG/ADMELOG) 100 units/mL subcutaneous injection 1-5 Units, HS    insulin lispro (HumALOG/ADMELOG) 100 units/mL subcutaneous injection 1-6 Units, TID AC **AND** Fingerstick Glucose (POCT), TID AC    ondansetron (ZOFRAN) injection 4 mg, Q6H PRN    pantoprazole (PROTONIX) EC tablet 20 mg, Early Morning  Prior to Admission Medications   Prescriptions Last Dose Informant Patient Reported? Taking?   Cholecalciferol (Vitamin D) 50 MCG (2000 UT) CAPS 3/29/2025 Self Yes Yes   Sig: Take 2,000 Units by mouth in the morning   Cyanocobalamin (Vitamin B 12) 500 MCG TABS 3/29/2025 Self Yes Yes   Sig: Take by mouth daily   SITagliptin-metFORMIN HCl ER (Janumet XR)  MG TB24 3/29/2025  No Yes   Sig: Take 1 tablet by mouth in the morning   aspirin (ECOTRIN LOW STRENGTH) 81 mg EC tablet 3/29/2025 Self Yes Yes   Sig: Take by mouth daily 1/2 tablet daily   atorvastatin (LIPITOR) 20 mg tablet 3/29/2025 Self Yes Yes   Sig: Take 20 mg by mouth daily   losartan-hydrochlorothiazide (HYZAAR) 100-25 MG per tablet 3/29/2025 Self Yes Yes   Sig: TAKE 1 TABLET BY MOUTH EVERY DAY FOR 30 DAYS   metFORMIN (GLUCOPHAGE-XR) 500 mg 24 hr tablet 3/29/2025 Self No Yes   Sig: Take 2 tablets (1,000 mg total) by mouth in the morning   omeprazole (PriLOSEC) 20 mg delayed release capsule 3/29/2025 Self Yes Yes   Sig: Take 10 mg by mouth daily   vitamin B-12 (VITAMIN B-12) 1,000 mcg tablet 3/29/2025  Yes Yes   Sig: Take 1,000 mcg by mouth daily      Facility-Administered Medications: None      Patient has no known allergies.    Objective :  Temp:  [97.9 °F (36.6 °C)-98.8 °F (37.1 °C)] 98.8 °F (37.1 °C)  HR:  [59-75] 67  BP: (110-130)/(62-74) 120/72  Resp:  [15-18] 16  SpO2:  [93 %-97 %] 93 %  O2 Device: None (Room air)    Physical Exam Neurological Exam  Awake  FC  O x self, month, 'medical place', but some paraphrasing, confusion  FS  SPARKS      Lab Results: I have reviewed the following results:  Recent Labs     03/29/25 2019   WBC 7.36   HGB 14.6   HCT 41.5      SODIUM 138   K 3.9      CO2 30   BUN 24   CREATININE 1.11   GLUC 105       Imaging Results Review: I reviewed radiology reports from this admission including: MRI brain. - see A/P

## 2025-03-30 NOTE — ASSESSMENT & PLAN NOTE
"Patient with development of worsening confusion, recently seen at the [facility] ED 3/24/2025 with CT head at that time with finding of brain lesion for which MRI was advised to exclude cancerous etiology.  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  Holding aspirin for this purpose  Biopsy an option if LP nondiagnostic  Steroid treatment on hold for biopsy  Appreciate neurosurgery and oncology recommendations  "

## 2025-03-30 NOTE — ASSESSMENT & PLAN NOTE
Has been well-controlled as outpatient with hemoglobin A1c of 6.6%  Hold home oral medications, start correctional insulin coverage with Accu-Cheks while admitted  Carb controlled diet  Initiate hypoglycemia protocol

## 2025-03-31 ENCOUNTER — APPOINTMENT (INPATIENT)
Dept: RADIOLOGY | Facility: HOSPITAL | Age: 65
DRG: 820 | End: 2025-03-31
Payer: COMMERCIAL

## 2025-03-31 LAB
ANION GAP SERPL CALCULATED.3IONS-SCNC: 9 MMOL/L (ref 4–13)
APPEARANCE CSF: CLEAR
BUN SERPL-MCNC: 17 MG/DL (ref 5–25)
CALCIUM SERPL-MCNC: 8.9 MG/DL (ref 8.4–10.2)
CHLORIDE SERPL-SCNC: 102 MMOL/L (ref 96–108)
CO2 SERPL-SCNC: 28 MMOL/L (ref 21–32)
CREAT SERPL-MCNC: 1 MG/DL (ref 0.6–1.3)
ERYTHROCYTE [DISTWIDTH] IN BLOOD BY AUTOMATED COUNT: 11.6 % (ref 11.6–15.1)
GFR SERPL CREATININE-BSD FRML MDRD: 78 ML/MIN/1.73SQ M
GLUCOSE CSF-MCNC: 52 MG/DL (ref 40–70)
GLUCOSE SERPL-MCNC: 121 MG/DL (ref 65–140)
GLUCOSE SERPL-MCNC: 122 MG/DL (ref 65–140)
GLUCOSE SERPL-MCNC: 127 MG/DL (ref 65–140)
GLUCOSE SERPL-MCNC: 138 MG/DL (ref 65–140)
GLUCOSE SERPL-MCNC: 139 MG/DL (ref 65–140)
GLUCOSE SERPL-MCNC: 174 MG/DL (ref 65–140)
GLUCOSE SERPL-MCNC: 177 MG/DL (ref 65–140)
GRAM STN SPEC: NORMAL
GRAM STN SPEC: NORMAL
HCT VFR BLD AUTO: 41.5 % (ref 36.5–49.3)
HGB BLD-MCNC: 14.4 G/DL (ref 12–17)
INR PPP: 1 (ref 0.85–1.19)
LYMPHOCYTES NFR CSF MANUAL: 78 %
MAGNESIUM SERPL-MCNC: 1.9 MG/DL (ref 1.9–2.7)
MCH RBC QN AUTO: 31.1 PG (ref 26.8–34.3)
MCHC RBC AUTO-ENTMCNC: 34.7 G/DL (ref 31.4–37.4)
MCV RBC AUTO: 90 FL (ref 82–98)
MONOS+MACROS CSF MANUAL: 21 %
NEUTROPHILS NFR CSF MANUAL: 1 %
PLATELET # BLD AUTO: 189 THOUSANDS/UL (ref 149–390)
PMV BLD AUTO: 10.4 FL (ref 8.9–12.7)
POTASSIUM SERPL-SCNC: 3.9 MMOL/L (ref 3.5–5.3)
PROT CSF-MCNC: 463 MG/DL (ref 15–45)
PROTHROMBIN TIME: 13.5 SECONDS (ref 12.3–15)
RBC # BLD AUTO: 4.63 MILLION/UL (ref 3.88–5.62)
RBC # CSF MANUAL: 298 UL (ref 0–10)
SODIUM SERPL-SCNC: 139 MMOL/L (ref 135–147)
TOTAL CELLS COUNTED BLD: YES
TOTAL CELLS COUNTED SPEC: 100
TUBE # CSF: 3
WBC # BLD AUTO: 7.08 THOUSAND/UL (ref 4.31–10.16)
WBC # CSF AUTO: 106 /UL (ref 0–5)

## 2025-03-31 PROCEDURE — 83735 ASSAY OF MAGNESIUM: CPT | Performed by: INTERNAL MEDICINE

## 2025-03-31 PROCEDURE — 88185 FLOWCYTOMETRY/TC ADD-ON: CPT | Performed by: INTERNAL MEDICINE

## 2025-03-31 PROCEDURE — 83615 LACTATE (LD) (LDH) ENZYME: CPT | Performed by: INTERNAL MEDICINE

## 2025-03-31 PROCEDURE — 62328 DX LMBR SPI PNXR W/FLUOR/CT: CPT

## 2025-03-31 PROCEDURE — 82948 REAGENT STRIP/BLOOD GLUCOSE: CPT

## 2025-03-31 PROCEDURE — 84166 PROTEIN E-PHORESIS/URINE/CSF: CPT | Performed by: INTERNAL MEDICINE

## 2025-03-31 PROCEDURE — 87070 CULTURE OTHR SPECIMN AEROBIC: CPT | Performed by: INTERNAL MEDICINE

## 2025-03-31 PROCEDURE — 88108 CYTOPATH CONCENTRATE TECH: CPT | Performed by: STUDENT IN AN ORGANIZED HEALTH CARE EDUCATION/TRAINING PROGRAM

## 2025-03-31 PROCEDURE — 99232 SBSQ HOSP IP/OBS MODERATE 35: CPT | Performed by: INTERNAL MEDICINE

## 2025-03-31 PROCEDURE — 88184 FLOWCYTOMETRY/ TC 1 MARKER: CPT | Performed by: INTERNAL MEDICINE

## 2025-03-31 PROCEDURE — 82945 GLUCOSE OTHER FLUID: CPT | Performed by: INTERNAL MEDICINE

## 2025-03-31 PROCEDURE — 86039 ANTINUCLEAR ANTIBODIES (ANA): CPT | Performed by: INTERNAL MEDICINE

## 2025-03-31 PROCEDURE — 89051 BODY FLUID CELL COUNT: CPT | Performed by: INTERNAL MEDICINE

## 2025-03-31 PROCEDURE — 89050 BODY FLUID CELL COUNT: CPT | Performed by: INTERNAL MEDICINE

## 2025-03-31 PROCEDURE — 85027 COMPLETE CBC AUTOMATED: CPT | Performed by: INTERNAL MEDICINE

## 2025-03-31 PROCEDURE — 009U3ZX DRAINAGE OF SPINAL CANAL, PERCUTANEOUS APPROACH, DIAGNOSTIC: ICD-10-PCS | Performed by: RADIOLOGY

## 2025-03-31 PROCEDURE — 80048 BASIC METABOLIC PNL TOTAL CA: CPT | Performed by: INTERNAL MEDICINE

## 2025-03-31 PROCEDURE — 99232 SBSQ HOSP IP/OBS MODERATE 35: CPT | Performed by: NURSE PRACTITIONER

## 2025-03-31 PROCEDURE — 84157 ASSAY OF PROTEIN OTHER: CPT | Performed by: INTERNAL MEDICINE

## 2025-03-31 PROCEDURE — 85610 PROTHROMBIN TIME: CPT | Performed by: INTERNAL MEDICINE

## 2025-03-31 RX ORDER — LIDOCAINE HYDROCHLORIDE 10 MG/ML
5 INJECTION, SOLUTION EPIDURAL; INFILTRATION; INTRACAUDAL; PERINEURAL
Status: COMPLETED | OUTPATIENT
Start: 2025-03-31 | End: 2025-03-31

## 2025-03-31 RX ADMIN — SODIUM CHLORIDE 75 ML/HR: 0.9 INJECTION, SOLUTION INTRAVENOUS at 00:00

## 2025-03-31 RX ADMIN — PANTOPRAZOLE SODIUM 20 MG: 20 TABLET, DELAYED RELEASE ORAL at 05:27

## 2025-03-31 RX ADMIN — LIDOCAINE HYDROCHLORIDE 5 ML: 10 INJECTION, SOLUTION EPIDURAL; INFILTRATION; INTRACAUDAL; PERINEURAL at 11:20

## 2025-03-31 RX ADMIN — Medication 2000 UNITS: at 07:40

## 2025-03-31 RX ADMIN — CYANOCOBALAMIN TAB 500 MCG 1000 MCG: 500 TAB at 07:39

## 2025-03-31 RX ADMIN — ATORVASTATIN CALCIUM 20 MG: 20 TABLET, FILM COATED ORAL at 07:40

## 2025-03-31 NOTE — PLAN OF CARE
Problem: PAIN - ADULT  Goal: Verbalizes/displays adequate comfort level or baseline comfort level  Description: Interventions:- Encourage patient to monitor pain and request assistance- Assess pain using appropriate pain scale- Administer analgesics based on type and severity of pain and evaluate response- Implement non-pharmacological measures as appropriate and evaluate response- Notify physician/advanced practitioner if interventions unsuccessful or patient reports new pain  Outcome: Progressing     Problem: INFECTION - ADULT  Goal: Absence or prevention of progression during hospitalization  Description: INTERVENTIONS:- Assess and monitor for signs and symptoms of infection- Monitor lab/diagnostic results- Monitor all insertion sites, i.e. indwelling lines, tubes, and drains- [place] appropriate cooling/warming therapies per order- Administer medications as ordered- Instruct and encourage patient and family to use good hand hygiene technique- Identify and instruct in appropriate isolation precautions for identified infection/condition  Outcome: Progressing     Problem: SAFETY ADULT  Goal: Patient will remain free of falls  Description: INTERVENTIONS:- Educate patient/family on patient safety including physical limitations- Instruct patient to call for assistance with activity - Consult OT/PT to assist with strengthening/mobility - Keep Call bell within reach- Keep bed low and locked with side rails adjusted as appropriate- Keep care items and personal belongings within reach- Initiate and maintain comfort rounds- Make Fall Risk Sign visible to staff- Offer Toileting every 2 Hours, in advance of need- Initiate/Maintain bed/chair alarm- Obtain necessary fall risk management equipment.- Apply yellow socks and bracelet for high fall risk patients- Consider moving patient to room near nurses station  Outcome: Progressing  Goal: Maintain or return to baseline ADL function  Description: INTERVENTIONS:-  Assess  patient's ability to carry out ADLs; assess patient's baseline for ADL function and identify physical deficits which impact ability to perform ADLs (bathing, care of mouth/teeth, toileting, grooming, dressing, etc.)- Assess/evaluate cause of self-care deficits - Assess range of motion- Assess patient's mobility; develop plan if impaired- Assess patient's need for assistive devices and provide as appropriate- Encourage maximum independence but intervene and supervise when necessary- Involve family in performance of ADLs- Assess for home care needs following discharge - Consider OT consult to assist with ADL evaluation and planning for discharge- Provide patient education as appropriate  Outcome: Progressing  Goal: Maintains/Returns to pre admission functional level  Description: INTERVENTIONS:- Perform AM-PAC 6 Click Basic Mobility/ Daily Activity assessment daily.- Set and communicate daily mobility goal to care team and patient/family/caregiver. - Collaborate with rehabilitation services on mobility goals if consulted- Reposition patient every 2 hours.- Dangle patient 3 times a day- Stand patient 3 times a day- Ambulate patient 3 times a day- Out of bed to chair 3 times a day - Out of bed for meals 3 times a day- Out of bed for toileting- Record patient progress and toleration of activity level   Outcome: Progressing     Problem: DISCHARGE PLANNING  Goal: Discharge to home or other facility with appropriate resources  Description: INTERVENTIONS:- Identify barriers to discharge w/patient and caregiver- Arrange for needed discharge resources and transportation as appropriate- Identify discharge learning needs (meds, wound care, etc.)- Refer to Case Management Department for coordinating discharge planning if the patient needs post-hospital services based on physician/advanced practitioner order or complex needs related to functional status, cognitive ability, or social support system  Outcome: Progressing      Problem: Knowledge Deficit  Goal: Patient/family/caregiver demonstrates understanding of disease process, treatment plan, medications, and discharge instructions  Description: Complete learning assessment and assess knowledge base.Interventions:- Provide teaching at level of understanding- Provide teaching via preferred learning methods  Outcome: Progressing     Problem: NEUROSENSORY - ADULT  Goal: Achieves stable or improved neurological status  Description: INTERVENTIONS- Monitor and report changes in neurological status- Monitor vital signs such as temperature, blood pressure, glucose, and any other labs ordered - Initiate measures to prevent increased intracranial pressure- Monitor for seizure activity and implement precautions if appropriate    Outcome: Progressing  Goal: Remains free of injury related to seizures activity  Description: INTERVENTIONS- Maintain airway, patient safety  and administer oxygen as ordered- Monitor patient for seizure activity, document and report duration and description of seizure to physician/advanced practitioner- If seizure occurs,  ensure patient safety during seizure- Reorient patient post seizure- Seizure pads on all 4 side rails- Instruct patient/family to notify RN of any seizure activity including if an aura is experienced- Instruct patient/family to call for assistance with activity based on nursing assessment- Administer anti-seizure medications if ordered  Outcome: Progressing  Goal: Achieves maximal functionality and self care  Description: INTERVENTIONS- Monitor swallowing and airway patency with patient fatigue and changes in neurological status- Encourage and assist patient to increase activity and self care. - Encourage visually impaired, hearing impaired and aphasic patients to use assistive/communication devices  Outcome: Progressing     Problem: METABOLIC, FLUID AND ELECTROLYTES - ADULT  Goal: Glucose maintained within target range  Description: INTERVENTIONS:-  Monitor Blood Glucose as ordered- Assess for signs and symptoms of hyperglycemia and hypoglycemia- Administer ordered medications to maintain glucose within target range- Assess nutritional intake and initiate nutrition service referral as needed  Outcome: Progressing

## 2025-03-31 NOTE — ASSESSMENT & PLAN NOTE
12 mm hypodense lesion within the hepatic lobe possibly representing hemangioma however metastatic lesion could not be excluded although suboptimally evaluated on the current exam.  MRI obtained: No suspicious hepatic lesions.  There is a simple right hepatic lobe cyst.  Mild diffuse hepatic steatosis.

## 2025-03-31 NOTE — PROGRESS NOTES
"Progress Note - Hospitalist   Name: Alexis Dennison 64 y.o. male I MRN: [id number]  Unit/Bed#: [facility] 705-01 I Date of Admission: 3/29/2025   Date of Service: 3/31/2025 I Hospital Day: 2    Assessment & Plan  Brain tumor (HCC)  Patient with development of worsening confusion, recently seen at the [facility] ED 3/24/2025 with CT head at that time with finding of brain lesion for which MRI was advised to exclude cancerous etiology.  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  Follow-up fluid studies, electrophoresis, leukemia/lymphoma panel, cytology  Biopsy an option if LP nondiagnostic  Steroid treatment on hold for biopsy  Appreciate neurosurgery and oncology recommendations  Liver lesion  12 mm hypodense lesion within the hepatic lobe possibly representing hemangioma however metastatic lesion could not be excluded although suboptimally evaluated on the current exam.  MRI obtained: No suspicious hepatic lesions.  There is a simple right hepatic lobe cyst.  Mild diffuse hepatic steatosis.  Type 2 diabetes mellitus with hyperglycemia, without long-term current use of insulin (HCC)  Has been well-controlled as outpatient with hemoglobin A1c of 6.6%  Hold home oral medications, start correctional insulin coverage with Accu-Cheks while admitted  Carb controlled diet  Initiate hypoglycemia protocol  Mixed hyperlipidemia  Chronic and stable, continue PTA " statin therapy  HTN, goal below 130/80  Blood pressure thus far acceptable, continue PTA losartan/HCTZ with strict hold parameters and monitor blood pressure per protocol  Thyroid nodule  Incidental findings on CT scan  Will need outpatient nonemergent thyroid ultrasound for follow-up  Pulmonary nodule  4 mm right lower lobe pulmonary nodule.  3-month follow-up CT chest advised    VTE Pharmacologic Prophylaxis: VTE Score: 5 High Risk (Score >/= 5) - Pharmacological DVT Prophylaxis Contraindicated. Sequential Compression Devices Ordered.    Mobility:   Basic Mobility Inpatient Raw Score: 24  JH-HLM Goal: 8: Walk 250 feet or more  JH-HLM Achieved: 5: Stand (1 or more minutes)  JH-HLM Goal achieved. Continue to encourage appropriate mobility.    Patient Centered Rounds: I performed bedside rounds with nursing staff today.   Discussions with Specialists or Other Care Team Provider: CM. Neurosurgery. Med Onc, IR.    Education and Discussions with Family / Patient: Updated  (wife, son, and daughter) at bedside.    Current Length of Stay: 2 day(s)  Current Patient Status: Inpatient   Certification Statement: The patient will continue to require additional inpatient hospital stay due to LP,  possible initation of therapy, clinical monitoring, dispo planning  Discharge Plan:  TBD pending clinical progression    Code Status: Level 1 - Full Code    Subjective   Patient seen and examined.  No significant change in mental status.  He is pleasant but still confused intermittently.  Afebrile.  No events overnight.    Objective :  Temp:  [98.5 °F (36.9 °C)-99.4 °F (37.4 °C)] 98.9 °F (37.2 °C)  HR:  [61-66] 65  BP: (106-120)/(58-72) 120/72  Resp:  [16-18] 16  SpO2:  [94 %-96 %] 96 %  O2 Device: None (Room air)    Body mass index is 27.26 kg/m².     Input and Output Summary (last 24 hours):     Intake/Output Summary (Last 24 hours) at 3/31/2025 1630  Last data filed at 3/31/2025 0825  Gross per 24 hour   Intake 300 ml    Output --   Net 300 ml       PHYSICAL EXAM:    Vitals signs reviewed  Constitutional   Awake and cooperative. NAD.   Head/Neck   Normocephalic. Atraumatic.   HEENT   No scleral icterus. EOMI.   Heart   Regular rate and rhythm. No murmurs.   Lungs   Clear to auscultation bilaterally. Respirations unlaboured.   Abdomen   Soft. Nontender. Nondistended.    Skin   Skin color normal. No rashes.   Extremities   No deformities. No peripheral edema.   Neuro   Alert and oriented. No new deficits.   Psych   Mood stable. Affect normal.         Lab Results: I have reviewed the following results:   Results from last 7 days   Lab Units 03/31/25 0446 03/29/25 2019   WBC Thousand/uL 7.08 7.36   HEMOGLOBIN g/dL 14.4 14.6   HEMATOCRIT % 41.5 41.5   PLATELETS Thousands/uL 189 209   SEGS PCT %  --  58   LYMPHO PCT %  --  32   MONO PCT %  --  7   EOS PCT %  --  2     Results from last 7 days   Lab Units 03/31/25 0446 03/29/25 2019 03/24/25  2116   SODIUM mmol/L 139   < > 137   POTASSIUM mmol/L 3.9   < > 3.9   CHLORIDE mmol/L 102   < > 98   CO2 mmol/L 28   < > 31   BUN mg/dL 17   < > 19   CREATININE mg/dL 1.00   < > 1.12   ANION GAP mmol/L 9   < > 8   CALCIUM mg/dL 8.9   < > 9.7   ALBUMIN g/dL  --   --  4.6   TOTAL BILIRUBIN mg/dL  --   --  0.97   ALK PHOS U/L  --   --  52   ALT U/L  --   --  19   AST U/L  --   --  13   GLUCOSE RANDOM mg/dL 122   < > 124    < > = values in this interval not displayed.     Results from last 7 days   Lab Units 03/31/25 0446   INR  1.00     Results from last 7 days   Lab Units 03/31/25  1610 03/31/25  1054 03/31/25  1048 03/31/25  0728 03/31/25  0642 03/30/25  2059 03/30/25  1554 03/30/25  1034 03/30/25  0606 03/30/25  0049   POC GLUCOSE mg/dl 127 139 174* 177* 121 116 171* 157* 102 139               Recent Cultures (last 7 days):   Results from last 7 days   Lab Units 03/31/25  1237   GRAM STAIN RESULT  No Polys or Bacteria seen  Rare Mononuclear Cells       Imaging Results Review: I reviewed  radiology reports from this admission including: MRI brain.  Other Study Results Review: No additional pertinent studies reviewed.    Last 24 Hours Medication List:     Current Facility-Administered Medications:     acetaminophen (TYLENOL) tablet 650 mg, Q6H PRN    [Held by provider] aspirin (ECOTRIN LOW STRENGTH) EC tablet 81 mg, Daily    atorvastatin (LIPITOR) tablet 20 mg, Daily    Cholecalciferol (VITAMIN D3) tablet 2,000 Units, Daily    cyanocobalamin (VITAMIN B-12) tablet 1,000 mcg, Daily    losartan (COZAAR) tablet 100 mg, Daily **AND** hydroCHLOROthiazide tablet 25 mg, Daily    insulin lispro (HumALOG/ADMELOG) 100 units/mL subcutaneous injection 1-5 Units, HS    insulin lispro (HumALOG/ADMELOG) 100 units/mL subcutaneous injection 1-6 Units, TID AC **AND** Fingerstick Glucose (POCT), TID AC    ondansetron (ZOFRAN) injection 4 mg, Q6H PRN    pantoprazole (PROTONIX) EC tablet 20 mg, Early Morning    sodium chloride 0.9 % infusion, Continuous, Last Rate: 75 mL/hr (03/31/25 0000)    Administrative Statements   Today, Patient Was Seen By: Alex Michael DO      **Please Note: This note may have been constructed using a voice recognition system.**

## 2025-03-31 NOTE — RESTORATIVE TECHNICIAN NOTE
Restorative Technician Note      Patient Name: Alexis Dennison     Note Type: Mobility  Patient Position Upon Consult: Supine  Activity Performed: Ambulated; Dangled; Stood  Assistive Device: Other (Comment) (none)  Education Provided: Yes  Patient Position at End of Consult: Supine; All needs within reach; Bed/Chair alarm activated    Cande BANEGAS, Restorative Technician,

## 2025-03-31 NOTE — PLAN OF CARE
Problem: PAIN - ADULT  Goal: Verbalizes/displays adequate comfort level or baseline comfort level  Description: Interventions:- Encourage patient to monitor pain and request assistance- Assess pain using appropriate pain scale- Administer analgesics based on type and severity of pain and evaluate response- Implement non-pharmacological measures as appropriate and evaluate response- Notify physician/advanced practitioner if interventions unsuccessful or patient reports new pain  Outcome: Progressing     Problem: INFECTION - ADULT  Goal: Absence or prevention of progression during hospitalization  Description: INTERVENTIONS:- Assess and monitor for signs and symptoms of infection- Monitor lab/diagnostic results- Monitor all insertion sites, i.e. indwelling lines, tubes, and drains- Oral appropriate cooling/warming therapies per order- Administer medications as ordered- Instruct and encourage patient and family to use good hand hygiene technique- Identify and instruct in appropriate isolation precautions for identified infection/condition  Outcome: Progressing     Problem: SAFETY ADULT  Goal: Patient will remain free of falls  Description: INTERVENTIONS:- Educate patient/family on patient safety including physical limitations- Instruct patient to call for assistance with activity - Consult OT/PT to assist with strengthening/mobility - Keep Call bell within reach- Keep bed low and locked with side rails adjusted as appropriate- Keep care items and personal belongings within reach- Initiate and maintain comfort rounds- Make Fall Risk Sign visible to staff- Offer Toileting every 2 Hours, in advance of need- Initiate/Maintain bed/chair alarm- Obtain necessary fall risk management equipment.- Apply yellow socks and bracelet for high fall risk patients- Consider moving patient to room near nurses station  Outcome: Progressing  Goal: Maintain or return to baseline ADL function  Description: INTERVENTIONS:-  Assess  patient's ability to carry out ADLs; assess patient's baseline for ADL function and identify physical deficits which impact ability to perform ADLs (bathing, care of mouth/teeth, toileting, grooming, dressing, etc.)- Assess/evaluate cause of self-care deficits - Assess range of motion- Assess patient's mobility; develop plan if impaired- Assess patient's need for assistive devices and provide as appropriate- Encourage maximum independence but intervene and supervise when necessary- Involve family in performance of ADLs- Assess for home care needs following discharge - Consider OT consult to assist with ADL evaluation and planning for discharge- Provide patient education as appropriate  Outcome: Progressing  Goal: Maintains/Returns to pre admission functional level  Description: INTERVENTIONS:- Perform AM-PAC 6 Click Basic Mobility/ Daily Activity assessment daily.- Set and communicate daily mobility goal to care team and patient/family/caregiver. - Collaborate with rehabilitation services on mobility goals if consulted- Reposition patient every 2 hours.- Dangle patient 3 times a day- Stand patient 3 times a day- Ambulate patient 3 times a day- Out of bed to chair 3 times a day - Out of bed for meals 3 times a day- Out of bed for toileting- Record patient progress and toleration of activity level   Outcome: Progressing     Problem: DISCHARGE PLANNING  Goal: Discharge to home or other facility with appropriate resources  Description: INTERVENTIONS:- Identify barriers to discharge w/patient and caregiver- Arrange for needed discharge resources and transportation as appropriate- Identify discharge learning needs (meds, wound care, etc.)- Refer to Case Management Department for coordinating discharge planning if the patient needs post-hospital services based on physician/advanced practitioner order or complex needs related to functional status, cognitive ability, or social support system  Outcome: Progressing      Problem: Knowledge Deficit  Goal: Patient/family/caregiver demonstrates understanding of disease process, treatment plan, medications, and discharge instructions  Description: Complete learning assessment and assess knowledge base.Interventions:- Provide teaching at level of understanding- Provide teaching via preferred learning methods  Outcome: Progressing     Problem: NEUROSENSORY - ADULT  Goal: Achieves stable or improved neurological status  Description: INTERVENTIONS- Monitor and report changes in neurological status- Monitor vital signs such as temperature, blood pressure, glucose, and any other labs ordered - Initiate measures to prevent increased intracranial pressure- Monitor for seizure activity and implement precautions if appropriate    Outcome: Progressing  Goal: Remains free of injury related to seizures activity  Description: INTERVENTIONS- Maintain airway, patient safety  and administer oxygen as ordered- Monitor patient for seizure activity, document and report duration and description of seizure to physician/advanced practitioner- If seizure occurs,  ensure patient safety during seizure- Reorient patient post seizure- Seizure pads on all 4 side rails- Instruct patient/family to notify RN of any seizure activity including if an aura is experienced- Instruct patient/family to call for assistance with activity based on nursing assessment- Administer anti-seizure medications if ordered  Outcome: Progressing  Goal: Achieves maximal functionality and self care  Description: INTERVENTIONS- Monitor swallowing and airway patency with patient fatigue and changes in neurological status- Encourage and assist patient to increase activity and self care. - Encourage visually impaired, hearing impaired and aphasic patients to use assistive/communication devices  Outcome: Progressing     Problem: METABOLIC, FLUID AND ELECTROLYTES - ADULT  Goal: Glucose maintained within target range  Description: INTERVENTIONS:-  Monitor Blood Glucose as ordered- Assess for signs and symptoms of hyperglycemia and hypoglycemia- Administer ordered medications to maintain glucose within target range- Assess nutritional intake and initiate nutrition service referral as needed  Outcome: Progressing

## 2025-03-31 NOTE — ASSESSMENT & PLAN NOTE
Lab Results   Component Value Date    HGBA1C 6.6 (H) 02/22/2025       Recent Labs     03/31/25  0642 03/31/25  0728 03/31/25  1048 03/31/25  1054   POCGLU 121 177* 174* 139       Blood Sugar Average: Last 72 hrs:  (P) 144

## 2025-03-31 NOTE — PROGRESS NOTES
Progress Note - Neurosurgery   Name: Alexis eDnnison 64 y.o. male I MRN: [id number]  Unit/Bed#: [facility] 705-01 I Date of Admission: 3/29/2025   Date of Service: 3/31/2025 I Hospital Day: 2    Assessment & Plan  Brain tumor (HCC)  Multifocal lesions  P/w confusion, short term memory loss.    Imaging:  MRI Brain 3/26/25: Multiple scattered foci of subependymal nodular enhancement throughout ventricles (left worse than right) with mild hydrocephalus (left worse than right), scattered nodular areas of enhancement in left anterior inferior basal ganglia involving left anterior commissure, and smaller foci of nodular enhancement along anteromedial aspect of left cerebral peduncle and left quirino. Differential includes lymphoma, metastasis, multicentric high-grade glioma with subependymal spread of tumor, among other differentials. Recommend neurosurgical consultation for further evaluation. Consider correlation with CSF analysis.    Plan:  Continue to monitor neuro exam closely.  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  Planning for LP with IR today including cytology and cx.  If LP unsuccessful for diagnosis, can consider stereotactic needle biopsy  Hold off on steroid management until Dx achieved.  Mobilize as tolerated.  DVT ppx: SCDs.    Neurosurgery will continue to follow peripherally. Call with questions.  Type 2 diabetes mellitus with hyperglycemia, without long-term current use of insulin (HCC)  Lab Results   Component Value Date    HGBA1C 6.6 (H) 02/22/2025       Recent Labs     03/31/25  0642 03/31/25  0728 03/31/25  1048 03/31/25  1054   POCGLU 121 177* 174* 139       Blood Sugar Average: Last 72 hrs:  (P) 144    Thyroid nodule    Pulmonary nodule    Liver lesion  MRI of abdomen confirming simple hepatic cyst.    I have discussed the above management plan in detail with the primary service.   Please contact the SecureChat role for the Neurosurgery service with any questions/concerns.    Subjective   States  he feels well.  Endorses loss of short term memory.    Objective :  Temp:  [98.1 °F (36.7 °C)-99.4 °F (37.4 °C)] 99.3 °F (37.4 °C)  HR:  [60-66] 66  BP: (106-112)/(58-63) 111/62  Resp:  [16-18] 18  SpO2:  [94 %-98 %] 94 %  O2 Device: None (Room air)    I/O         03/29 0701 03/30 0700 03/30 0701 03/31 0700 03/31 0701 04/01 0700    P.O.  360 300    Total Intake(mL/kg)  360 (4.2) 300 (3.5)    Urine (mL/kg/hr)  3 (0)     Total Output  3     Net  +357 +300           Unmeasured Urine Occurrence  5 x           Physical Exam  Vitals and nursing note reviewed.   Constitutional:       General: He is not in acute distress.     Appearance: He is well-developed.   HENT:      Head: Normocephalic and atraumatic.   Eyes:      General: Lids are normal.      Extraocular Movements: Extraocular movements intact.      Conjunctiva/sclera: Conjunctivae normal.      Pupils: Pupils are equal, round, and reactive to light.   Cardiovascular:      Rate and Rhythm: Normal rate and regular rhythm.      Heart sounds: No murmur heard.  Pulmonary:      Effort: Pulmonary effort is normal. No respiratory distress.      Breath sounds: Normal breath sounds.   Abdominal:      Palpations: Abdomen is soft.      Tenderness: There is no abdominal tenderness.   Musculoskeletal:         General: No swelling.      Cervical back: Neck supple.   Skin:     General: Skin is warm and dry.      Capillary Refill: Capillary refill takes less than 2 seconds.   Neurological:      Mental Status: He is alert.      Motor: Motor strength is normal.  Psychiatric:         Mood and Affect: Mood normal.      Neurological Exam  Mental Status  Alert. Oriented only to person and place. Able to perform serial calculations.  Stated it was February 2052..    Cranial Nerves  CN II: Visual acuity is normal. Visual fields full to confrontation.  CN III, IV, VI: Extraocular movements intact bilaterally. Normal lids and orbits bilaterally. Pupils equal round and reactive to light  bilaterally.  CN V: Facial sensation is normal.  CN VII: Full and symmetric facial movement.  CN VIII: Hearing is normal.  CN IX, X: Palate elevates symmetrically. Normal gag reflex.  CN XI: Shoulder shrug strength is normal.  CN XII: Tongue midline without atrophy or fasciculations.    Motor   Strength is 5/5 throughout all four extremities.        Lab Results: I have reviewed the following results:  Recent Labs     03/31/25  0446   WBC 7.08   HGB 14.4   HCT 41.5      SODIUM 139   K 3.9      CO2 28   BUN 17   CREATININE 1.00   GLUC 122   MG 1.9   INR 1.00       Imaging Results Review: I personally reviewed the following image studies in PACS and associated radiology reports: MRI brain. My interpretation of the radiology images/reports is: as above.  Other Study Results Review: No additional pertinent studies reviewed.    VTE Pharmacologic Prophylaxis: VTE covered by:    None    and Sequential compression device (Venodyne)

## 2025-03-31 NOTE — UTILIZATION REVIEW
Initial Clinical Review    Admission: Date/Time/Statement:   Admission Orders (From admission, onward)       Ordered        03/29/25 2259  INPATIENT ADMISSION  Once                          Orders Placed This Encounter   Procedures    INPATIENT ADMISSION     Standing Status:   Standing     Number of Occurrences:   1     Level of Care:   Med Surg [16]     Estimated length of stay:   More than 2 Midnights     Certification:   I certify that inpatient services are medically necessary for this patient for a duration of greater than two midnights. See H&P and MD Progress Notes for additional information about the patient's course of treatment.     ED Arrival Information       Expected   -    Arrival   3/29/2025 18:18    Acuity   Urgent              Means of arrival   Walk-In    Escorted by   Family Member    Service   Hospitalist    Admission type   Emergency              Arrival complaint   Confused             Chief Complaint   Patient presents with    Medical Problem     Pt was seen at ED on Monday, had a CT. Pt told that he has a brain mass. Pt then went for MRI on Wednesday 3/26. Since then, family noticed that he is increasingly more confused since last night. Pt c/o a frontal head ache.       Initial Presentation: 64 y.o. male presents to ed from home on 3-29-25 for evaluation and treatment of confusion.  Evaluated in ed on 3- 24  and found to have a brain Mass on CT.  Had outpatient MRI on 3-26.  Family noticed he is increasingly more confused since last night.  He now reports a frontal head ache. He has scheduled neurosurgery outpatient follow-up but his confusion has been worsening over the last few days. He is forgetting people's names and getting lost and having more difficulty taking care of himself at home. PMHX:  DM, HTN, liver disease.      Clinical assessment significant for pain 4/10, disoriented to time.      Imaging unchanged- 12 mm hypodense lesion within R hepatic lobe.  Possible hemangioma vs  metastatic lesion.   Admit to inpatient med surg for brain tumor with altered mental status.   Plan includes:   MRI abdomen, consult oncology and neurosurgery.      Date: 3-30-25    Day 2: inpatient med surg   Certification Statement: The patient will continue to require additional inpatient hospital stay due to LP,  possible initation of therapy, clinical monitoring, dispo planning  Discharge Plan:  TBD pending clinical progression   Patient remains intermittently confused.  Started iv .9% ns 75/hr. Hold aspirin. Hold steroids.  Plan includes : MRI to further evaluate hepatic lobe lesion, lumbar puncture  vs biopsy.    Date: 3-31-25   Day 3: Has surpassed a 2nd midnight with active treatments and services.  GCS =14.  MRI of abdomen : no suspicious hepatic lesion. Simple right hepatic lobe cyst, mild diffuse hepatic steatosis.  Remains on iv .9% ns 75/hr.       ED Treatment-Medication Administration from 03/29/2025 1818 to 03/29/2025 2348      Scheduled Medications:    [Held by provider] aspirin, 81 mg, Oral, Daily  atorvastatin, 20 mg, Oral, Daily  Cholecalciferol, 2,000 Units, Oral, Daily  vitamin B-12, 1,000 mcg, Oral, Daily  losartan, 100 mg, Oral, Daily   And  hydroCHLOROthiazide, 25 mg, Oral, Daily  insulin lispro, 1-5 Units, Subcutaneous, HS  insulin lispro, 1-6 Units, Subcutaneous, TID AC  pantoprazole, 20 mg, Oral, Early Morning      Continuous IV Infusions:  sodium chloride, 75 mL/hr, Intravenous, Continuous      PRN Meds:  acetaminophen, 650 mg, Oral, Q6H PRN  ondansetron, 4 mg, Intravenous, Q6H PRN      ED Triage Vitals [03/29/25 1828]   Temperature Pulse Respirations Blood Pressure SpO2 Pain Score   97.9 °F (36.6 °C) 75 18 110/70 97 % 4     Weight (last 2 days)       Date/Time Weight    03/29/25 1828 86.2 (190)            Vital Signs (last 3 days)       Date/Time Temp Pulse Resp BP MAP (mmHg) SpO2 O2 Device Randolph Coma Scale Score Pain    03/31/25 0800 -- -- -- -- -- -- -- 14 No Pain    03/31/25  "07:23:07 99.3 °F (37.4 °C) 66 18 111/62 78 94 % -- -- --    03/31/25 0400 -- -- -- -- -- -- -- 14 --    03/31/25 0000 -- -- -- -- -- -- -- 14 --    03/30/25 23:56:08 98.5 °F (36.9 °C) 64 -- 112/63 79 95 % -- -- --    03/30/25 21:30:22 99.4 °F (37.4 °C) 62 17 106/58 74 95 % None (Room air) -- --    03/30/25 2048 -- -- -- -- -- -- -- 14 No Pain    03/30/25 19:31:04 98.7 °F (37.1 °C) 61 17 109/59 76 94 % -- -- --    03/30/25 1600 -- -- -- -- -- -- -- 14 --    03/30/25 15:11:08 98.1 °F (36.7 °C) 60 16 110/62 78 98 % -- -- --    03/30/25 1200 -- -- -- -- -- -- -- 14 --    03/30/25 10:35:28 98.8 °F (37.1 °C) 67 16 120/72 88 93 % None (Room air) -- No Pain    03/30/25 0800 -- -- -- -- -- -- -- 14 --    03/30/25 07:11:31 98.6 °F (37 °C) 65 16 121/74 90 93 % None (Room air) -- No Pain    03/30/25 04:43:38 98.8 °F (37.1 °C) 59 -- 113/68 83 96 % -- -- --    03/30/25 04:42:40 98.8 °F (37.1 °C) 61 -- 113/68 83 94 % -- -- --    03/30/25 0400 -- -- -- -- -- -- -- 15 --    03/30/25 0111 -- -- -- -- -- -- -- -- No Pain    03/30/25 0000 -- -- -- -- -- -- -- 15 --    03/29/25 23:56:37 98.7 °F (37.1 °C) 67 17 115/68 84 -- -- -- --    03/29/25 2312 -- 66 16 113/62 -- 94 % None (Room air) -- No Pain    03/29/25 1945 -- 70 15 130/74 96 94 % None (Room air) -- --    03/29/25 1943 -- -- -- -- -- -- -- 15 --    03/29/25 1828 97.9 °F (36.6 °C) 75 18 110/70 -- 97 % None (Room air) -- 4         Pertinent Labs/Diagnostic Test Results:      MRI of the brain 3/26/2025 concerning for \"multiple scattered areas of subependymoma nodular enhancement throughout ventricles with mild hydrocephalus left worse than right, scattered nodular areas of enhancement in left anterior inferior basal ganglia involving left anterior commissure, and smaller foci of nodular enhancement along anteromedial aspect of the left cerebral peduncle and left quirino.\"     Radiology:  MRI abdomen w wo contrast   Final (03/31 1021)      1.  No suspicious hepatic lesions. There is a " simple right hepatic lobe cyst.   2.  Mild diffuse hepatic steatosis.         CT head wo contrast   Final  (03/29 2224)      Stable subependymal nodules and left foramen of De Oliveira region hyperdense lesion again seen with associated mild symmetrical dilatation of the left lateral ventricle and minimal left to right midline shift. The overall findings are unchanged compared to the prior study. No acute intracranial hemorrhage or evidence of acute cerebral infarction.         CTA chest abdomen pelvis w wo contrast   Final (03/29 2126)      1.  There is a nonspecific 12 mm hypodense lesion within the right hepatic lobe. While this may represent a hemangioma, a metastatic lesion can not be excluded. This is suboptimally evaluated on the current exam, due to arterial timing of the contrast bolus and background diffuse hepatic steatosis. Further evaluation by gadolinium enhanced MRI of the abdomen is recommended.      2.  Nonspecific 4 mm right lower lobe pulmonary nodule. In the setting of a known malignancy, a 3-month follow-up chest CT could be performed for further evaluation.   3.  Heterogeneous nodular enlargement of the left thyroid lobe. A nonemergent thyroid ultrasound follow-up is recommended.      4.  Please refer to the report body for description of other incidental, chronic and/or benign findings.         FL IN-patient lumbar puncture        Cardiology:  ECG 12 lead   Final (03/29 2100)   Normal sinus rhythm   Possible Inferior infarct (cited on or before 24-Mar-2025)   Abnormal ECG        GI:  No orders to display           Results from last 7 days   Lab Units 03/31/25 0446 03/29/25 2019 03/24/25 2116   WBC Thousand/uL 7.08 7.36 7.15   HEMOGLOBIN g/dL 14.4 14.6 14.9   HEMATOCRIT % 41.5 41.5 43.1   PLATELETS Thousands/uL 189 209 213   TOTAL NEUT ABS Thousands/µL  --  4.31 3.92         Results from last 7 days   Lab Units 03/31/25 0446 03/29/25 2019 03/24/25 2116   SODIUM mmol/L 139 138 137   POTASSIUM  mmol/L 3.9 3.9 3.9   CHLORIDE mmol/L 102 101 98   CO2 mmol/L 28 30 31   ANION GAP mmol/L 9 7 8   BUN mg/dL 17 24 19   CREATININE mg/dL 1.00 1.11 1.12   EGFR ml/min/1.73sq m 78 69 68   CALCIUM mg/dL 8.9 9.6 9.7   MAGNESIUM mg/dL 1.9  --   --      Results from last 7 days   Lab Units 03/24/25  2116   AST U/L 13   ALT U/L 19   ALK PHOS U/L 52   TOTAL PROTEIN g/dL 7.4   ALBUMIN g/dL 4.6   TOTAL BILIRUBIN mg/dL 0.97     Results from last 7 days   Lab Units 03/31/25  0728 03/31/25  0642 03/30/25  2059 03/30/25  1554 03/30/25  1034 03/30/25  0606 03/30/25  0049   POC GLUCOSE mg/dl 177* 121 116 171* 157* 102 139     Results from last 7 days   Lab Units 03/31/25  0446 03/29/25 2019 03/24/25  2116   GLUCOSE RANDOM mg/dL 122 105 124       Results from last 7 days   Lab Units 03/24/25  2116   HS TNI 0HR ng/L <2         Results from last 7 days   Lab Units 03/31/25  0446   PROTIME seconds 13.5   INR  1.00     Results from last 7 days   Lab Units 03/24/25  2236   CLARITY UA  Clear   COLOR UA  Light Yellow   SPEC GRAV UA  1.020   PH UA  6.0   GLUCOSE UA mg/dl 1000 (1%)*   KETONES UA mg/dl 40 (2+)*   BLOOD UA  Negative   PROTEIN UA mg/dl Negative   NITRITE UA  Negative   BILIRUBIN UA  Negative   UROBILINOGEN UA (BE) mg/dl <2.0   LEUKOCYTES UA  Negative       Past Medical History:   Diagnosis Date    Colon polyp     Diabetes mellitus (HCC)     GERD (gastroesophageal reflux disease)     Hyperlipidemia     Hypertension     Liver disease     Sleep apnea      Present on Admission:     Brain tumor (HCC)   Type 2 diabetes mellitus with hyperglycemia, without long-term current use of insulin (HCC)   Mixed hyperlipidemia   HTN, goal below 130/80   (Resolved) Abnormal CT scan      Admitting Diagnosis:     Confusion [R41.0]  Abnormal CT scan [R93.89]  Brain mass [G93.89]    Age/Sex: 64 y.o. male    Network Utilization Review Department  ATTENTION: Please call with any questions or concerns to [phone] and carefully listen to the prompts  so that you are directed to the right person. All voicemails are confidential.   For Discharge needs, contact Care Management DC Support Team at [phone] opt. 2  Send all requests for admission clinical reviews, approved or denied determinations and any other requests to dedicated fax number below belonging to the campus where the patient is receiving treatment. List of dedicated fax numbers for the Facilities:  FACILITY NAME UR FAX NUMBER   ADMISSION DENIALS (Administrative/Medical Necessity) [phone]   DISCHARGE SUPPORT TEAM (NETWORK) [phone]   PARENT CHILD HEALTH (Maternity/NICU/Pediatrics) [phone]   [facility] [phone]   [facility] [phone]   [facility] [phone]   [facility] [phone]   [facility] [phone]   [facility] [phone]   [facility] [phone]   [facility] [phone]   [facility] [phone]   Critical access hospital [phone]   [facility] [phone]   [facility] [phone]

## 2025-03-31 NOTE — ASSESSMENT & PLAN NOTE
Multifocal lesions  P/w confusion, short term memory loss.    Imaging:  MRI Brain 3/26/25: Multiple scattered foci of subependymal nodular enhancement throughout ventricles (left worse than right) with mild hydrocephalus (left worse than right), scattered nodular areas of enhancement in left anterior inferior basal ganglia involving left anterior commissure, and smaller foci of nodular enhancement along anteromedial aspect of left cerebral peduncle and left quirino. Differential includes lymphoma, metastasis, multicentric high-grade glioma with subependymal spread of tumor, among other differentials. Recommend neurosurgical consultation for further evaluation. Consider correlation with CSF analysis.    Plan:  Continue to monitor neuro exam closely.  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  Planning for LP with IR today including cytology and cx.  If LP unsuccessful for diagnosis, can consider stereotactic needle biopsy  Hold off on steroid management until Dx achieved.  Mobilize as tolerated.  DVT ppx: SCDs.    Neurosurgery will continue to follow peripherally. Call with questions.

## 2025-03-31 NOTE — ASSESSMENT & PLAN NOTE
"Patient with development of worsening confusion, recently seen at the [facility] ED 3/24/2025 with CT head at that time with finding of brain lesion for which MRI was advised to exclude cancerous etiology.  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  Follow-up fluid studies, electrophoresis, leukemia/lymphoma panel, cytology  Biopsy an option if LP nondiagnostic  Steroid treatment on hold for biopsy  Appreciate neurosurgery and oncology recommendations  "

## 2025-03-31 NOTE — UTILIZATION REVIEW
NOTIFICATION OF INPATIENT ADMISSION   AUTHORIZATION REQUEST   SERVICING FACILITY:   [facility]  Address: [address] [place] [address]  Tax ID: [id number]  NPI: [id number] ATTENDING PROVIDER:  Attending Name and NPI#: Alex Michael Do [[id number]]  Address: [address] [place] [address]  Phone: [phone]   ADMISSION INFORMATION:  Place of Service: Inpatient [facility]  Place of Service Code: 21  Inpatient Admission Date/Time: 3/29/25 10:59 PM  Discharge Date/Time: No discharge date for patient encounter.  Admitting Diagnosis Code/Description:  Confusion [R41.0]  Abnormal CT scan [R93.89]  Brain mass [G93.89]     UTILIZATION REVIEW CONTACT:  Renan Givens Utilization   Network Utilization Review Department  Phone: [phone]  Fax: [phone]  Email: [email or web address]  Contact for approvals/pending authorizations, clinical reviews, and discharge.     PHYSICIAN ADVISORY SERVICES:  Medical Necessity Denial & [id number] Review  Phone: [phone]  Fax: [phone]  Email: [email or web address]     DISCHARGE SUPPORT TEAM:  For Patients Discharge Needs & Updates  Phone: [phone] opt. 2 Fax: [phone]  Email: [email or web address]

## 2025-04-01 PROBLEM — K59.00 CONSTIPATION: Status: ACTIVE | Noted: 2025-04-01

## 2025-04-01 PROBLEM — R26.2 AMBULATORY DYSFUNCTION: Status: ACTIVE | Noted: 2025-04-01

## 2025-04-01 LAB
ANION GAP SERPL CALCULATED.3IONS-SCNC: 7 MMOL/L (ref 4–13)
BUN SERPL-MCNC: 15 MG/DL (ref 5–25)
CALCIUM SERPL-MCNC: 8.9 MG/DL (ref 8.4–10.2)
CHLORIDE SERPL-SCNC: 107 MMOL/L (ref 96–108)
CO2 SERPL-SCNC: 26 MMOL/L (ref 21–32)
CREAT SERPL-MCNC: 1.03 MG/DL (ref 0.6–1.3)
ERYTHROCYTE [DISTWIDTH] IN BLOOD BY AUTOMATED COUNT: 11.4 % (ref 11.6–15.1)
GFR SERPL CREATININE-BSD FRML MDRD: 75 ML/MIN/1.73SQ M
GLUCOSE SERPL-MCNC: 115 MG/DL (ref 65–140)
GLUCOSE SERPL-MCNC: 120 MG/DL (ref 65–140)
GLUCOSE SERPL-MCNC: 149 MG/DL (ref 65–140)
GLUCOSE SERPL-MCNC: 164 MG/DL (ref 65–140)
GLUCOSE SERPL-MCNC: 211 MG/DL (ref 65–140)
HCT VFR BLD AUTO: 39.2 % (ref 36.5–49.3)
HGB BLD-MCNC: 14 G/DL (ref 12–17)
LDH FLD L TO P-CCNC: 117 IU/L
MAGNESIUM SERPL-MCNC: 1.9 MG/DL (ref 1.9–2.7)
MCH RBC QN AUTO: 31.5 PG (ref 26.8–34.3)
MCHC RBC AUTO-ENTMCNC: 35.7 G/DL (ref 31.4–37.4)
MCV RBC AUTO: 88 FL (ref 82–98)
PLATELET # BLD AUTO: 184 THOUSANDS/UL (ref 149–390)
PMV BLD AUTO: 10.2 FL (ref 8.9–12.7)
POTASSIUM SERPL-SCNC: 4 MMOL/L (ref 3.5–5.3)
RBC # BLD AUTO: 4.44 MILLION/UL (ref 3.88–5.62)
SODIUM SERPL-SCNC: 140 MMOL/L (ref 135–147)
WBC # BLD AUTO: 7.15 THOUSAND/UL (ref 4.31–10.16)

## 2025-04-01 PROCEDURE — 80048 BASIC METABOLIC PNL TOTAL CA: CPT | Performed by: INTERNAL MEDICINE

## 2025-04-01 PROCEDURE — 85027 COMPLETE CBC AUTOMATED: CPT | Performed by: INTERNAL MEDICINE

## 2025-04-01 PROCEDURE — 97167 OT EVAL HIGH COMPLEX 60 MIN: CPT

## 2025-04-01 PROCEDURE — 99232 SBSQ HOSP IP/OBS MODERATE 35: CPT | Performed by: STUDENT IN AN ORGANIZED HEALTH CARE EDUCATION/TRAINING PROGRAM

## 2025-04-01 PROCEDURE — 83735 ASSAY OF MAGNESIUM: CPT | Performed by: INTERNAL MEDICINE

## 2025-04-01 PROCEDURE — 97163 PT EVAL HIGH COMPLEX 45 MIN: CPT

## 2025-04-01 PROCEDURE — 82948 REAGENT STRIP/BLOOD GLUCOSE: CPT

## 2025-04-01 RX ORDER — POLYETHYLENE GLYCOL 3350 17 G/17G
17 POWDER, FOR SOLUTION ORAL DAILY
Status: DISCONTINUED | OUTPATIENT
Start: 2025-04-01 | End: 2025-04-30

## 2025-04-01 RX ORDER — SENNOSIDES 8.6 MG
2 TABLET ORAL
Status: DISCONTINUED | OUTPATIENT
Start: 2025-04-01 | End: 2025-05-07 | Stop reason: HOSPADM

## 2025-04-01 RX ORDER — MAGNESIUM HYDROXIDE/ALUMINUM HYDROXICE/SIMETHICONE 120; 1200; 1200 MG/30ML; MG/30ML; MG/30ML
30 SUSPENSION ORAL EVERY 4 HOURS PRN
Status: DISCONTINUED | OUTPATIENT
Start: 2025-04-01 | End: 2025-05-07 | Stop reason: HOSPADM

## 2025-04-01 RX ADMIN — SODIUM CHLORIDE 75 ML/HR: 0.9 INJECTION, SOLUTION INTRAVENOUS at 01:52

## 2025-04-01 RX ADMIN — SENNOSIDES 17.2 MG: 8.6 TABLET, FILM COATED ORAL at 21:37

## 2025-04-01 RX ADMIN — PANTOPRAZOLE SODIUM 20 MG: 20 TABLET, DELAYED RELEASE ORAL at 05:34

## 2025-04-01 RX ADMIN — INSULIN LISPRO 1 UNITS: 100 INJECTION, SOLUTION INTRAVENOUS; SUBCUTANEOUS at 17:11

## 2025-04-01 RX ADMIN — POLYETHYLENE GLYCOL 3350 17 G: 17 POWDER, FOR SOLUTION ORAL at 09:53

## 2025-04-01 RX ADMIN — CYANOCOBALAMIN TAB 500 MCG 1000 MCG: 500 TAB at 09:28

## 2025-04-01 RX ADMIN — LOSARTAN POTASSIUM 100 MG: 50 TABLET, FILM COATED ORAL at 09:28

## 2025-04-01 RX ADMIN — ACETAMINOPHEN 650 MG: 325 TABLET, FILM COATED ORAL at 14:11

## 2025-04-01 RX ADMIN — INSULIN LISPRO 2 UNITS: 100 INJECTION, SOLUTION INTRAVENOUS; SUBCUTANEOUS at 11:07

## 2025-04-01 RX ADMIN — Medication 2000 UNITS: at 09:26

## 2025-04-01 RX ADMIN — ATORVASTATIN CALCIUM 20 MG: 20 TABLET, FILM COATED ORAL at 09:26

## 2025-04-01 RX ADMIN — SODIUM CHLORIDE 75 ML/HR: 0.9 INJECTION, SOLUTION INTRAVENOUS at 11:51

## 2025-04-01 RX ADMIN — HYDROCHLOROTHIAZIDE 25 MG: 25 TABLET ORAL at 09:26

## 2025-04-01 NOTE — ASSESSMENT & PLAN NOTE
Has been well-controlled as outpatient with hemoglobin A1c of 6.6%  Hold home oral medications, start correctional insulin coverage with Accu-Cheks while admitted  Continue hypoglycemia protocol and carb controlled diet

## 2025-04-01 NOTE — PLAN OF CARE
Problem: OCCUPATIONAL THERAPY ADULT  Goal: Performs self-care activities at highest level of function for planned discharge setting.  See evaluation for individualized goals.  Description: Treatment Interventions: ADL retraining, Visual perceptual retraining, Functional transfer training, UE strengthening/ROM, Endurance training, Cognitive reorientation, Patient/family training, Equipment evaluation/education, Compensatory technique education, Continued evaluation, Energy conservation, Activityengagement, Fine motor coordination activities  Equipment Recommended:  (tbd)       See flowsheet documentation for full assessment, interventions and recommendations.   Note: Limitation: Decreased ADL status, Decreased cognition, Decreased Safe judgement during ADL, Decreased UE ROM, Decreased UE strength, Decreased endurance, Decreased fine motor control, Decreased self-care trans, Decreased high-level ADLs  Prognosis: Poor, Fair  Assessment: Pt is a 64 y.o. male who was admitted to [facility] on 3/29/2025 with with worsening confusion and now here with a Brain tumor (HCC) RI Brain 3/26/25: Multiple scattered foci of subependymal nodular enhancement throughout ventricles (left worse than right) with mild hydrocephalus (left worse than right), scattered nodular areas of enhancement in left anterior inferior basal ganglia involving left anterior commissure, and smaller foci of nodular enhancement along anteromedial aspect of left cerebral peduncle and left quirino . Patient   has a past medical history of Colon polyp, Diabetes mellitus (HCC), GERD (gastroesophageal reflux disease), Hyperlipidemia, Hypertension, Liver disease, and Sleep apnea.  At baseline pt was completing I with ADL's/IaDL's, no AD with functional mobility. Pt lives with spouse in a 2SH with [facility]. Currently pt requires min/mod a   for overall ADLS and min a for CG without aD  for functional mobility/transfers. Pt currently presents with impairments in  the following categories -difficulty performing ADLS and difficulty performing IADLS  activity tolerance. These impairments, as well as pt's fatigue  limit pt's ability to safely engage in all baseline areas of occupation, includingbathing, dressing, toileting, functional mobility/transfers, and community mobility From OT standpoint, recommend mod level II upon D/C. The patient's raw score on the -PAC Daily Activity Inpatient Short Form is 17. A raw score of less than 19 suggests the patient may benefit from discharge to post-acute rehabilitation services. Please refer to the recommendation of the Occupational Therapist for safe discharge planning. OT will continue to follow to address the below stated goals.     Rehab Resource Intensity Level, OT: II (Moderate Resource Intensity)

## 2025-04-01 NOTE — ASSESSMENT & PLAN NOTE
Incidental findings on CT scan  Will need nonemergent thyroid ultrasound for follow-up in the outpatient setting

## 2025-04-01 NOTE — ASSESSMENT & PLAN NOTE
"Patient with development of worsening confusion, recently seen at the [facility] ED 3/24/2025 with CT head at that time with finding of brain lesion for which MRI was advised to exclude cancerous etiology.  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 with mild hydrocephalus left worse than right, scattered nodular areas of enhancement in left anterior inferior basal ganglia involving left anterior commissure, and smaller foci of nodular enhancement along anteromedial aspect of the left cerebral peduncle and left quirino.\"  Plan was for expedited outpatient neurosurgery evaluation, however patient with worsening confusion as below which prompted presentation to the ED    S/p LP on 3/31  Follow-up cytology, culture leukemia/lymphoma panel  Neurosurgery and oncology following, input reviewed  Awaiting LP testing to discuss further treatment-holding steroids pending above workup  "

## 2025-04-01 NOTE — PROGRESS NOTES
"Progress Note - Hospitalist   Name: Alexis Dennison 64 y.o. male I MRN: [id number]  Unit/Bed#: [facility] 705-01 I Date of Admission: 3/29/2025   Date of Service: 4/1/2025 I Hospital Day: 3    Assessment & Plan  Brain tumor (HCC)  Patient with development of worsening confusion, recently seen at the [facility] ED 3/24/2025 with CT head at that time with finding of brain lesion for which MRI was advised to exclude cancerous etiology.  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 with mild hydrocephalus left worse than right, scattered nodular areas of enhancement in left anterior inferior basal ganglia involving left anterior commissure, and smaller foci of nodular enhancement along anteromedial aspect of the left cerebral peduncle and left quirino.\"  Plan was for expedited outpatient neurosurgery evaluation, however patient with worsening confusion as below which prompted presentation to the ED    S/p LP on 3/31  Follow-up cytology, culture leukemia/lymphoma panel  Neurosurgery and oncology following, input reviewed  Awaiting LP testing to discuss further treatment-holding steroids pending above workup  Liver lesion  12 mm hypodense lesion within the hepatic lobe possibly representing hemangioma however metastatic lesion could not be excluded although suboptimally evaluated on the current exam.  MRI obtained: No suspicious hepatic lesions.  There is a simple right hepatic lobe cyst.  Mild diffuse hepatic steatosis.  Type 2 diabetes mellitus with hyperglycemia, without long-term current use of insulin (HCC)  Has been well-controlled as outpatient with hemoglobin A1c of 6.6%  Hold home oral medications, start correctional insulin coverage with Accu-Cheks while admitted  Continue hypoglycemia protocol and carb controlled diet  Mixed hyperlipidemia  Chronic and stable, continue PTA statin   HTN, goal below 130/80  Blood pressure " thus far acceptable, continue PTA losartan/HCTZ with strict hold parameters and monitor blood pressure per protocol  Thyroid nodule  Incidental findings on CT scan  Will need nonemergent thyroid ultrasound for follow-up in the outpatient setting  Pulmonary nodule  4 mm right lower lobe pulmonary nodule.  3-month follow-up CT chest advised  Constipation  Start senna MiraLAX  Ambulate patient as able  Ambulatory dysfunction  PT/OT ordered  Fall precautions    VTE Pharmacologic Prophylaxis: VTE Score: 5 High Risk (Score >/= 5) - Pharmacological DVT Prophylaxis Contraindicated. Sequential Compression Devices Ordered.    Mobility:   Basic Mobility Inpatient Raw Score: 24  JH-HLM Goal: 8: Walk 250 feet or more  JH-HLM Achieved: 7: Walk 25 feet or more  JH-HLM Goal NOT achieved. Continue with multidisciplinary rounding and encourage appropriate mobility to improve upon JH-HLM goals.    Patient Centered Rounds: I performed bedside rounds with nursing staff today.   Discussions with Specialists or Other Care Team Provider: None    Education and Discussions with Family / Patient: Updated  (daughter) via phone.    Current Length of Stay: 3 day(s)  Current Patient Status: Inpatient   Certification Statement: The patient will continue to require additional inpatient hospital stay due to as above  Discharge Plan: Anticipate discharge in >72 hrs to discharge location to be determined pending rehab evaluations.    Code Status: Level 1 - Full Code    Subjective   No events overnight.  Patient has no complaints this morning.  Reports sleeping well last night.  Unclear last BM.  Has no nausea or vomiting.    Objective :  Temp:  [98.4 °F (36.9 °C)-98.9 °F (37.2 °C)] 98.9 °F (37.2 °C)  HR:  [54-66] 66  BP: (108-131)/(59-73) 131/73  Resp:  [16-18] 18  SpO2:  [95 %-97 %] 95 %    Body mass index is 27.26 kg/m².     Input and Output Summary (last 24 hours):     Intake/Output Summary (Last 24 hours) at 4/1/2025 0942  Last data  filed at 4/1/2025 0101  Gross per 24 hour   Intake 360 ml   Output --   Net 360 ml       Physical Exam  Vitals and nursing note reviewed.   Constitutional:       General: He is not in acute distress.     Appearance: He is well-developed.   HENT:      Head: Normocephalic and atraumatic.   Eyes:      Conjunctiva/sclera: Conjunctivae normal.   Cardiovascular:      Rate and Rhythm: Normal rate and regular rhythm.   Pulmonary:      Effort: No respiratory distress.      Breath sounds: Normal breath sounds. No wheezing or rhonchi.   Musculoskeletal:         General: No swelling.      Cervical back: Neck supple.   Skin:     General: Skin is warm and dry.   Neurological:      Mental Status: He is alert.      Comments: Alert and oriented to person only, reports his June 2026 and he is at a nursing facility   Psychiatric:         Behavior: Behavior normal.           Lines/Drains:              Lab Results: I have reviewed the following results:   Results from last 7 days   Lab Units 04/01/25  0535 03/31/25 0446 03/29/25 2019   WBC Thousand/uL 7.15   < > 7.36   HEMOGLOBIN g/dL 14.0   < > 14.6   HEMATOCRIT % 39.2   < > 41.5   PLATELETS Thousands/uL 184   < > 209   SEGS PCT %  --   --  58   LYMPHO PCT %  --   --  32   MONO PCT %  --   --  7   EOS PCT %  --   --  2    < > = values in this interval not displayed.     Results from last 7 days   Lab Units 04/01/25  0535   SODIUM mmol/L 140   POTASSIUM mmol/L 4.0   CHLORIDE mmol/L 107   CO2 mmol/L 26   BUN mg/dL 15   CREATININE mg/dL 1.03   ANION GAP mmol/L 7   CALCIUM mg/dL 8.9   GLUCOSE RANDOM mg/dL 115     Results from last 7 days   Lab Units 03/31/25  0446   INR  1.00     Results from last 7 days   Lab Units 04/01/25  0626 03/31/25 2059 03/31/25  1610 03/31/25  1054 03/31/25  1048 03/31/25  0728 03/31/25  0642 03/30/25 2059 03/30/25  1554 03/30/25  1034 03/30/25  0606 03/30/25  0049   POC GLUCOSE mg/dl 120 138 127 139 174* 177* 121 116 171* 157* 102 139               Recent  Cultures (last 7 days):   Results from last 7 days   Lab Units 03/31/25  1237   GRAM STAIN RESULT  No Polys or Bacteria seen  Rare Mononuclear Cells       Imaging Results Review: No pertinent imaging studies reviewed.  Other Study Results Review: No additional pertinent studies reviewed.    Last 24 Hours Medication List:     Current Facility-Administered Medications:     acetaminophen (TYLENOL) tablet 650 mg, Q6H PRN    [Held by provider] aspirin (ECOTRIN LOW STRENGTH) EC tablet 81 mg, Daily    atorvastatin (LIPITOR) tablet 20 mg, Daily    Cholecalciferol (VITAMIN D3) tablet 2,000 Units, Daily    cyanocobalamin (VITAMIN B-12) tablet 1,000 mcg, Daily    losartan (COZAAR) tablet 100 mg, Daily **AND** hydroCHLOROthiazide tablet 25 mg, Daily    insulin lispro (HumALOG/ADMELOG) 100 units/mL subcutaneous injection 1-5 Units, HS    insulin lispro (HumALOG/ADMELOG) 100 units/mL subcutaneous injection 1-6 Units, TID AC **AND** Fingerstick Glucose (POCT), TID AC    ondansetron (ZOFRAN) injection 4 mg, Q6H PRN    pantoprazole (PROTONIX) EC tablet 20 mg, Early Morning    polyethylene glycol (MIRALAX) packet 17 g, Daily    senna (SENOKOT) tablet 17.2 mg, HS    sodium chloride 0.9 % infusion, Continuous, Last Rate: 75 mL/hr (04/01/25 0152)    Administrative Statements   Today, Patient Was Seen By: Dionne Huang MD      **Please Note: This note may have been constructed using a voice recognition system.**

## 2025-04-01 NOTE — PHYSICAL THERAPY NOTE
Physical Therapy Evaluation    Patient Name: Alexis Dennison    Today's Date: 4/1/2025     Problem List  Principal Problem:    Brain tumor (HCC)  Active Problems:    Type 2 diabetes mellitus with hyperglycemia, without long-term current use of insulin (HCC)    HTN, goal below 130/80    Mixed hyperlipidemia    Thyroid nodule    Pulmonary nodule    Liver lesion    Constipation    Ambulatory dysfunction       Past Medical History  Past Medical History:   Diagnosis Date    Colon polyp     Diabetes mellitus (HCC)     GERD (gastroesophageal reflux disease)     Hyperlipidemia     Hypertension     Liver disease     Sleep apnea         Past Surgical History  Past Surgical History:   Procedure Laterality Date    COLONOSCOPY      CYST REMOVAL      back    FL LUMBAR PUNCTURE DIAGNOSTIC  3/31/2025    FL EXC B9 LESION MRGN XCP SK TG T/A/L 0.6-1.0 CM N/A 6/7/2023    Procedure: EXCISION  BIOPSY LESION/MASS ABDOMINAL/CHEST WALL;  Surgeon: Trevor Villavicencio MD;  Location:  MAIN OR;  Service: General         04/01/25 1359   PT Last Visit   PT Visit Date 04/01/25   Note Type   Note type Evaluation   Pain Assessment   Pain Assessment Tool FLACC   Pain Location/Orientation Location: Neck   Pain Rating: FLACC (Rest) - Face 0   Pain Rating: FLACC (Rest) - Legs 0   Pain Rating: FLACC (Rest) - Activity 0   Pain Rating: FLACC (Rest) - Cry 0   Pain Rating: FLACC (Rest) - Consolability 0   Score: FLACC (Rest) 0   Pain Rating: FLACC (Activity) - Face 1   Pain Rating: FLACC (Activity) - Legs 0   Pain Rating: FLACC (Activity) - Activity 0   Pain Rating: FLACC (Activity) - Cry 1   Pain Rating: FLACC (Activity) - Consolability 1   Score: FLACC (Activity) 3   Restrictions/Precautions   Weight Bearing Precautions Per Order No   Other Precautions Chair Alarm;Cognitive;Bed Alarm;Multiple lines;Telemetry;Fall Risk  (Expressive aphasia)   Home Living   Type of Home House   Home Layout Two level;Bed/bath  "upstairs;1/2 bath on main level;Stairs to enter with rails  (3 ISAIAH)   Additional Comments no AD Use PTA. 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   General   Family/Caregiver Present No   Cognition   Overall Cognitive Status (S)  Impaired   Arousal/Participation Cooperative   Orientation Level Oriented to person;Oriented to place;Disoriented to time;Disoriented to situation  (not city)   Memory Decreased long term memory;Decreased short term memory;Decreased recall of recent events;Decreased recall of biographical information   Following Commands Follows one step commands with increased time or repetition   Comments pleasantly confused. trouble sequencing tasks throughout session   Subjective   Subjective \"I need to go on the highway\"-pt referring to bathroom   RLE Assessment   RLE Assessment WFL   LLE Assessment   LLE Assessment WFL   Light Touch   RLE Light Touch Grossly intact  (per pt)   LLE Light Touch Grossly intact  (per pt)   Bed Mobility   Supine to Sit 5  Supervision   Additional items Increased time required   Sit to Supine Unable to assess   Transfers   Sit to Stand 5  Supervision   Additional items Increased time required   Stand to Sit 5  Supervision   Additional items Increased time required   Toilet transfer 4  Minimal assistance   Additional items Assist x 1;Increased time required;Verbal cues;Standard toilet   Additional Comments while standing at toilet to urinate, pt had BM on floor without realizing. He had poor sequencing when instructed to sit down on toilet. He left " his gown up exposing himself on way out of bathroom and required cues to cover himself up   Ambulation/Elevation   Gait pattern Decreased foot clearance;Short stride   Gait Assistance 4  Minimal assist   Additional items Assist x 1;Verbal cues  (CGA)   Assistive Device None   Distance 10'x2   Balance   Static Sitting Fair +   Dynamic Sitting Fair   Static Standing Fair -   Dynamic Standing Poor +   Ambulatory Poor +   Endurance Deficit   Endurance Deficit Yes   Activity Tolerance   Activity Tolerance Treatment limited secondary to medical complications (Comment)  (cog)   Medical Staff Made Aware OT   Assessment   Prognosis Fair   Problem List Decreased endurance;Impaired balance;Decreased mobility;Decreased cognition;Impaired judgement;Decreased safety awareness   Assessment Pt is a 64 y.o. male admitted to [facility] on 3/29/2025 with worsening confusion. Pt received a primary medical dx of brain tumor. Pt has the following comorbidities which affect their treatment: active problems listed above,  has a past medical history of Colon polyp, Diabetes mellitus (HCC), GERD (gastroesophageal reflux disease), Hyperlipidemia, Hypertension, Liver disease, and Sleep apnea.  , as well as personal factors including stairs to access home and ?caregiver support. Pt has a high complexity clinical presentation due to Ongoing medical management for primary dx, Decreased activity tolerance compared to baseline, Fall risk, Increased assistance needed from caregiver at current time, Ongoing telemetry monitoring, Cog status, Trending lab values, Diagnostic imaging pending, Continuous pulse oximetry monitoring  , and PMH. PT was consulted to evaluate pt's functional mobility and discharge needs. Upon evaluation, patient required S for bed mobility, S for STS transfers, CGA for ambulation. Body system impairments include impaired balance, endurance, cognition, +incontinent. Pt's functional activity impairments include: activity tolerance and  mobility. Participation restrictions include inability to safely access home/community or return to work or driving. At conclusion of eval, pt remained seated in chair with chair alarm, phone, call bell, and all other personal needs within reach. Pt would benefit from skilled PT to address their functional mobility limitations. The patient's AM-PAC Basic Mobility Inpatient Short Form Raw Score is 18. A Raw score of greater than 16 suggests the patient may benefit from discharge to home. Please also refer to the recommendation of the Physical Therapist for safe discharge planning.   Barriers to Discharge Decreased caregiver support;Inaccessible home environment   Goals   Patient Goals none stated   STG Expiration Date 04/15/25   Short Term Goal #1 In 14 days, pt will: 1) perform bed mobility independently to decrease caregiver burden. 2) perform transfers to<>from all surface independently to promote safety and decrease caregiver burden. 3) ambulate 300' with mod I and least restrictive device to promote safe return to home 4) negotiate 13 stairs with mod I to promote safe return to home 5) improve balance grades by 1/2 to promote safety with functional mobility. 6) Sequence functional tasks with no verbal cues to improve safety, efficiency and decrease caregiver burden.   PT Treatment Day 0   Plan   Treatment/Interventions LE strengthening/ROM;Functional transfer training;Elevations;Therapeutic exercise;Cognitive reorientation;Endurance training;Bed mobility;Equipment eval/education;Patient/family training;Gait training;Spoke to nursing;Spoke to case management;OT;Continued evaluation;Compensatory technique education;Family   PT Frequency 1-2x/wk   Discharge Recommendation   Rehab Resource Intensity Level, PT II (Moderate Resource Intensity)   Additional Comments need to confirm level of support pt has at home-anticipate he will need 24/7 at home for cognition   AM-PAC Basic Mobility Inpatient   Turning in Flat Bed  Without Bedrails 3   Lying on Back to Sitting on Edge of Flat Bed Without Bedrails 3   Moving Bed to Chair 3   Standing Up From Chair Using Arms 3   Walk in Room 3   Climb 3-5 Stairs With Railing 3   Basic Mobility Inpatient Raw Score 18   Basic Mobility Standardized Score 41.05   [facility] Highest Level Of Mobility   -[facility] Goal 6: Walk 10 steps or more   -HLM Achieved 7: Walk 25 feet or more   Modified Chula Scale   Modified [place] Scale 3   Barthel Index   Feeding 5   Bathing 0   Grooming Score 0   Dressing Score 5   Bladder Score 5   Bowels Score 5   Toilet Use Score 5   Transfers (Bed/Chair) Score 10   Mobility (Level Surface) Score 0   Stairs Score 0   Barthel Index Score 35   Marcus Campa, PT, DPT, NCS

## 2025-04-01 NOTE — OCCUPATIONAL THERAPY NOTE
Occupational Therapy Evaluation     Patient Name: Alexis Dennison  Today's Date: 4/1/2025  Problem List  Principal Problem:    Brain tumor (HCC)  Active Problems:    Type 2 diabetes mellitus with hyperglycemia, without long-term current use of insulin (HCC)    HTN, goal below 130/80    Mixed hyperlipidemia    Thyroid nodule    Pulmonary nodule    Liver lesion    Constipation    Ambulatory dysfunction    Past Medical History  Past Medical History:   Diagnosis Date    Colon polyp     Diabetes mellitus (HCC)     GERD (gastroesophageal reflux disease)     Hyperlipidemia     Hypertension     Liver disease     Sleep apnea      Past Surgical History  Past Surgical History:   Procedure Laterality Date    COLONOSCOPY      CYST REMOVAL      back    FL LUMBAR PUNCTURE DIAGNOSTIC  3/31/2025    CT EXC B9 LESION MRGN XCP SK TG T/A/L 0.6-1.0 CM N/A 6/7/2023    Procedure: EXCISION  BIOPSY LESION/MASS ABDOMINAL/CHEST WALL;  Surgeon: Trevor Villavicencio MD;  Location:  MAIN OR;  Service: General         04/01/25 1331   OT Last Visit   OT Visit Date 04/01/25   Note Type   Note type Evaluation   Pain Assessment   Pain Assessment Tool 0-10   Pain Score No Pain   Restrictions/Precautions   Weight Bearing Precautions Per Order No   Other Precautions Cognitive;Chair Alarm;Bed Alarm;Telemetry;Fall Risk  (Cognitive impairment with evaluation, right UE mild hemiparesis, expressive aphasia with word finding difficulty/dysarthria)   Home Living   Type of Home House   Home Layout Two level;1/2 bath on main level   Bathroom Shower/Tub Tub/shower unit   Bathroom Toilet Standard   Bathroom Equipment (no DME at baseline)   Bathroom Accessibility Accessible   Home Equipment (no AD at baseline) pt is a poor historian, continue to assess   Prior Function   Level of [place] Independent with ADLs;Independent with IADLS   Lives With Spouse   Receives Help From Family -spouse, children   IADLs Independent with driving;Independent with meal  "prep;Independent with medication management   Lifestyle   Autonomy I with ADL's/iaDL's, no AD with functional mobility +drives   Reciprocal Relationships spouse, children   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\"   Intrinsic Gratification pt unable to state   General   Family/Caregiver Present No   ADL   Eating Assistance 5  Supervision/Setup   Grooming Assistance 5  Supervision/Setup   UB Bathing Assistance 4  Minimal Assistance   LB Bathing Assistance 3  Moderate Assistance   UB Dressing Assistance 4  Minimal Assistance   LB Dressing Assistance 3  Moderate Assistance   Toileting Assistance  4  Minimal Assistance   Bed Mobility   Supine to Sit 5  Supervision   Sit to Supine 5  Supervision   Additional Comments HOB elevated   Transfers   Sit to Stand 5  Supervision   Additional items Assist x 1   Stand to Sit 5  Supervision   Additional items Assist x 1   Additional Comments no AD , cues for safety   Functional Mobility   Functional Mobility 4  Minimal assistance   Additional Comments min a for CG without AD short distance functional mobility no overt LOB, directional/sequencing cues. pt confused and requiring step by step instructions to complete functional tasks.   Balance   Static Sitting Fair   Dynamic Sitting Fair   Static Standing Fair -   Dynamic Standing Fair -   Ambulatory Poor +   Activity Tolerance   Activity Tolerance Patient limited by fatigue  (confusion)   Medical Staff Made Aware PT due to the patient's co-morbidities, clinically unstable presentation, and present impairments which are a regression from the patient's baseline.     Nurse Made Aware RN cleared pt for therapy   RUE Assessment   RUE Assessment X  (Right hand dominant, RUE hemiparesis~shoulder 4/5, elbow 4/5,  4-/5 some weakness compared to left UE.)   LUE Assessment   LUE Assessment WFL 5/5   Hand Function   Gross Motor Coordination Impaired   Fine Motor Coordination Impaired  (mild tremor to left hand, " "increased time with finger to thumb sequencing (possible due to cognition?))   Perception   Inattention/Neglect Appears intact   Cognition   Overall Cognitive Status (S)  Impaired   Arousal/Participation Alert;Responsive;Cooperative   Attention Attends with cues to redirect   Orientation Level Oriented to person;Oriented to place;Disoriented to situation;Disoriented to time  (grossy to place, not date/year, situation)   Memory Decreased recall of precautions;Decreased recall of recent events;Decreased short term memory;Decreased recall of biographical information;Decreased long term memory   Following Commands Follows one step commands inconsistently   Comments pt agreeable to therapy, pleasantly confused,-difficulty with finding correct words with communication/expressive aphasia and dysarthria, oriented x 2 (grossly to place, [facility], not town with choice cues, date stating \"march\" unable to problem solve what month is next in calendar, unable to state reason for admission/current medical events stated to \"fix the highway\". Poor historian/STM/LTM, distractible with session, required step by step instructions to perform toileting (poor self awraeness pooping on floor while standing to urinate), poor problem solving with functional tasks.   Assessment   Limitation Decreased ADL status;Decreased cognition;Decreased Safe judgement during ADL;Decreased UE ROM;Decreased UE strength;Decreased endurance;Decreased fine motor control;Decreased self-care trans;Decreased high-level ADLs   Prognosis Poor;Fair   Assessment Pt is a 64 y.o. male who was admitted to [facility] on 3/29/2025 with with worsening confusion and now here with a Brain tumor (HCC) [place] Brain 3/26/25: Multiple scattered foci of subependymal nodular enhancement throughout ventricles (left worse than right) with mild hydrocephalus (left worse than right), scattered nodular areas of enhancement in left anterior inferior basal ganglia involving left " anterior commissure, and smaller foci of nodular enhancement along anteromedial aspect of left cerebral peduncle and left quirino . Patient   has a past medical history of Colon polyp, Diabetes mellitus (HCC), GERD (gastroesophageal reflux disease), Hyperlipidemia, Hypertension, Liver disease, and Sleep apnea.  At baseline pt was completing I with ADL's/IaDL's, no AD with functional mobility. Pt lives with spouse in a 2SH with [facility]. Currently pt requires min/mod a   for overall ADLS and min a for CG without aD  for functional mobility/transfers. Pt currently presents with impairments in the following categories -difficulty performing ADLS and difficulty performing IADLS  activity tolerance. These impairments, as well as pt's fatigue  limit pt's ability to safely engage in all baseline areas of occupation, includingbathing, dressing, toileting, functional mobility/transfers, and community mobility From OT standpoint, recommend mod level II upon D/C. The patient's raw score on the -PAC Daily Activity Inpatient Short Form is 17. A raw score of less than 19 suggests the patient may benefit from discharge to post-acute rehabilitation services. Please refer to the recommendation of the Occupational Therapist for safe discharge planning. OT will continue to follow to address the below stated goals.   Goals   Patient Goals go to the bathroom   LTG Time Frame 10-14   Long Term Goal #1 see goals below   Plan   Treatment Interventions ADL retraining;Visual perceptual retraining;Functional transfer training;UE strengthening/ROM;Endurance training;Cognitive reorientation;Patient/family training;Equipment evaluation/education;Compensatory technique education;Continued evaluation;Energy conservation;Activityengagement;Fine motor coordination activities   Goal Expiration Date 04/15/25   OT Frequency 2-3x/wk   Discharge Recommendation   Rehab Resource Intensity Level, OT II (Moderate Resource Intensity)   Equipment Recommended (tbd)    AM-PAC Daily Activity Inpatient   Lower Body Dressing 2   Bathing 3   Toileting 3   Upper Body Dressing 3   Grooming 3   Eating 3   Daily Activity Raw Score 17   Daily Activity Standardized Score (Calc for Raw Score >=11) 37.26   AM-PAC Applied Cognition Inpatient   Following a Speech/Presentation 1   Understanding Ordinary Conversation 3   Taking Medications 1   Remembering Where Things Are Placed or Put Away 1   Remembering List of 4-5 Errands 1   Taking Care of Complicated Tasks 1   Applied Cognition Raw Score 8   Applied Cognition Standardized Score 19.32   End of Consult   Patient Position at End of Consult Bedside chair;Bed/Chair alarm activated;All needs within reach   Nurse Communication Nurse aware of consult      Occupational Therapy Goals:    *Mod I with bed mobility to engage in functional tasks.  *Mod I Adl's after setup with use of AE PRN  *Mod I toileting and clothing management   *Mod I functional mobility and transfers to/from all surfaces with Fair + dynamic balance and safety for participation in dynamic adls and iadl tasks   *Demonstrate good carryover with safe use of RW during functional tasks   *Assess DME needs   *Increase activity tolerance to 25-30 minutes for participation in adls and enjoyable activities  *Pt to participate in further cognitive testing with good attention and participation to assist with safe d/c recommendations  *Mod I with Simulated IADL management task.  *Demonstrate good carryover of pt/family education and training with good tolerance for increased safety and independence with ADL's/ADl's.  *Pt will improve standing balance to 4-5 minutes with functional tasks to increase I with toileting/transfers.  *Patient will demonstrate 100% carryover of energy conservation techniques t/o functional I/ADL/leisure tasks w/o cues s/p skilled education to increase endurance during functional tasks  *Pt will increase attention and follow 100% simple one step verbal commands and  be A/Ox4 consistently t/o use of external environmental cues w/ mod I  *Pt will participate in fine/gross motor coordination/strenthening/dexterity exercises to Good in order to increase participation in functional activities.   *Pt will improve L UE ROM 1/2 MMT via AROM/AAROM/PROM in all planes as tolerated in order to participate in functional activities.  *Pt will engage in ongoing visual perceptual assessments, screenings, and activities to improve ADL performance, as well as to assist in safety/d/c planning.   Yuridia Andrade OTR/L

## 2025-04-01 NOTE — PLAN OF CARE
Problem: PHYSICAL THERAPY ADULT  Goal: Performs mobility at highest level of function for planned discharge setting.  See evaluation for individualized goals.  Description: Treatment/Interventions: LE strengthening/ROM, Functional transfer training, Elevations, Therapeutic exercise, Cognitive reorientation, Endurance training, Bed mobility, Equipment eval/education, Patient/family training, Gait training, Spoke to nursing, Spoke to case management, OT, Continued evaluation, Compensatory technique education, Family          See flowsheet documentation for full assessment, interventions and recommendations.  Note: Prognosis: Fair  Problem List: Decreased endurance, Impaired balance, Decreased mobility, Decreased cognition, Impaired judgement, Decreased safety awareness  Assessment: Pt is a 64 y.o. male admitted to [facility]"" on 3/29/2025 with worsening confusion. Pt received a primary medical dx of brain tumor. Pt has the following comorbidities which affect their treatment: active problems listed above,  has a past medical history of Colon polyp, Diabetes mellitus (HCC), GERD (gastroesophageal reflux disease), Hyperlipidemia, Hypertension, Liver disease, and Sleep apnea.  , as well as personal factors including stairs to access home and ?caregiver support. Pt has a high complexity clinical presentation due to Ongoing medical management for primary dx, Decreased activity tolerance compared to baseline, Fall risk, Increased assistance needed from caregiver at current time, Ongoing telemetry monitoring, Cog status, Trending lab values, Diagnostic imaging pending, Continuous pulse oximetry monitoring  , and PMH. PT was consulted to evaluate pt's functional mobility and discharge needs. Upon evaluation, patient required S for bed mobility, S for STS transfers, CGA for ambulation. Body system impairments include impaired balance, endurance, cognition, +incontinent. Pt's functional activity impairments include: activity  tolerance and mobility. Participation restrictions include inability to safely access home/community or return to work or driving. At conclusion of eval, pt remained seated in chair with chair alarm, phone, call bell, and all other personal needs within reach. Pt would benefit from skilled PT to address their functional mobility limitations. The patient's AM-PAC Basic Mobility Inpatient Short Form Raw Score is 18. A Raw score of greater than 16 suggests the patient may benefit from discharge to home. Please also refer to the recommendation of the Physical Therapist for safe discharge planning.  Barriers to Discharge: Decreased caregiver support, Inaccessible home environment     Rehab Resource Intensity Level, PT: II (Moderate Resource Intensity)    See flowsheet documentation for full assessment.

## 2025-04-02 PROBLEM — G93.40 ENCEPHALOPATHY: Status: ACTIVE | Noted: 2025-04-02

## 2025-04-02 LAB
GLUCOSE SERPL-MCNC: 107 MG/DL (ref 65–140)
GLUCOSE SERPL-MCNC: 131 MG/DL (ref 65–140)
GLUCOSE SERPL-MCNC: 132 MG/DL (ref 65–140)
GLUCOSE SERPL-MCNC: 188 MG/DL (ref 65–140)

## 2025-04-02 PROCEDURE — 99233 SBSQ HOSP IP/OBS HIGH 50: CPT | Performed by: STUDENT IN AN ORGANIZED HEALTH CARE EDUCATION/TRAINING PROGRAM

## 2025-04-02 PROCEDURE — 82948 REAGENT STRIP/BLOOD GLUCOSE: CPT

## 2025-04-02 PROCEDURE — 88108 CYTOPATH CONCENTRATE TECH: CPT | Performed by: STUDENT IN AN ORGANIZED HEALTH CARE EDUCATION/TRAINING PROGRAM

## 2025-04-02 RX ADMIN — ACETAMINOPHEN 650 MG: 325 TABLET, FILM COATED ORAL at 11:20

## 2025-04-02 RX ADMIN — PANTOPRAZOLE SODIUM 20 MG: 20 TABLET, DELAYED RELEASE ORAL at 05:35

## 2025-04-02 RX ADMIN — CYANOCOBALAMIN TAB 500 MCG 1000 MCG: 500 TAB at 08:51

## 2025-04-02 RX ADMIN — SODIUM CHLORIDE 75 ML/HR: 0.9 INJECTION, SOLUTION INTRAVENOUS at 11:18

## 2025-04-02 RX ADMIN — INSULIN LISPRO 1 UNITS: 100 INJECTION, SOLUTION INTRAVENOUS; SUBCUTANEOUS at 11:20

## 2025-04-02 RX ADMIN — ATORVASTATIN CALCIUM 20 MG: 20 TABLET, FILM COATED ORAL at 08:51

## 2025-04-02 RX ADMIN — POLYETHYLENE GLYCOL 3350 17 G: 17 POWDER, FOR SOLUTION ORAL at 08:51

## 2025-04-02 RX ADMIN — Medication 2000 UNITS: at 08:51

## 2025-04-02 NOTE — ASSESSMENT & PLAN NOTE
Blood pressure acceptable, continue PTA losartan/HCTZ with strict hold parameters and monitor blood pressure per protocol

## 2025-04-02 NOTE — CASE MANAGEMENT
Case Management Discharge Planning Note    Patient name Alexis Dennison  Location [facility]-01 MRN [id number]  : 1960 Date 2025       Current Admission Date: 3/29/2025  Current Admission Diagnosis:Brain tumor (HCC)   Patient Active Problem List    Diagnosis Date Noted Date Diagnosed    Encephalopathy 2025     Constipation 2025     Ambulatory dysfunction 2025     Thyroid nodule 2025     Pulmonary nodule 2025     Liver lesion 2025     Brain tumor (HCC) 2025     Type 2 diabetes mellitus with hyperglycemia, without long-term current use of insulin (HCC) 2022     HTN, goal below 130/80 2022     Mixed hyperlipidemia 2022     Vitamin D deficiency 2022     NAFLD (nonalcoholic fatty liver disease) 2022     Fatigue 2022       LOS (days): 4  Geometric Mean LOS (GMLOS) (days): 4  Days to GMLOS:0.3     OBJECTIVE:  Risk of Unplanned Readmission Score: 9.7         Current admission status: Inpatient   Preferred Pharmacy:   [facility]/pharmacy #[id number] - [place], [place] - [address] [address]  [address] [address]  Phone: [phone] Fax: [phone]    [facility] - [facility] - [place], [place] - [address]  [address]  [facility] [address]  Phone: [phone] Fax: [phone]    Primary Care Provider: PATI Mckeon    Primary Insurance: BLUE CROSS  Secondary Insurance: CAPITAL    DISCHARGE DETAILS:                      Additional Comments: Patient not medically stable for discharge- awaiting LP testing results to determine course of treatment.

## 2025-04-02 NOTE — PLAN OF CARE
Problem: PAIN - ADULT  Goal: Verbalizes/displays adequate comfort level or baseline comfort level  Description: Interventions:- Encourage patient to monitor pain and request assistance- Assess pain using appropriate pain scale- Administer analgesics based on type and severity of pain and evaluate response- Implement non-pharmacological measures as appropriate and evaluate response- Notify physician/advanced practitioner if interventions unsuccessful or patient reports new pain  Outcome: Progressing     Problem: SAFETY ADULT  Goal: Patient will remain free of falls  Description: INTERVENTIONS:- Educate patient/family on patient safety including physical limitations- Instruct patient to call for assistance with activity - Consult OT/PT to assist with strengthening/mobility - Keep Call bell within reach- Keep bed low and locked with side rails adjusted as appropriate- Keep care items and personal belongings within reach- Initiate and maintain comfort rounds- Make Fall Risk Sign visible to staff- Offer Toileting every 2 Hours, in advance of need- Initiate/Maintain bed/chair alarm- Obtain necessary fall risk management equipment.- Apply yellow socks and bracelet for high fall risk patients- Consider moving patient to room near nurses station  Outcome: Progressing     Problem: NEUROSENSORY - ADULT  Goal: Achieves stable or improved neurological status  Description: INTERVENTIONS- Monitor and report changes in neurological status- Monitor vital signs such as temperature, blood pressure, glucose, and any other labs ordered - Initiate measures to prevent increased intracranial pressure- Monitor for seizure activity and implement precautions if appropriate    Outcome: Progressing     Problem: METABOLIC, FLUID AND ELECTROLYTES - ADULT  Goal: Glucose maintained within target range  Description: INTERVENTIONS:- Monitor Blood Glucose as ordered- Assess for signs and symptoms of hyperglycemia and hypoglycemia- Administer ordered  medications to maintain glucose within target range- Assess nutritional intake and initiate nutrition service referral as needed  Outcome: Progressing

## 2025-04-02 NOTE — PROGRESS NOTES
"Progress Note - Hospitalist   Name: Alexis Dennison 64 y.o. male I MRN: [id number]  Unit/Bed#: [facility] 705-01 I Date of Admission: 3/29/2025   Date of Service: 4/2/2025 I Hospital Day: 4    Assessment & Plan  Brain tumor (HCC)  Patient with development of worsening confusion, recently seen at the [facility] ED 3/24/2025 with CT head at that time with finding of brain lesion for which MRI was advised to exclude cancerous etiology.  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 with mild hydrocephalus left worse than right, scattered nodular areas of enhancement in left anterior inferior basal ganglia involving left anterior commissure, and smaller foci of nodular enhancement along anteromedial aspect of the left cerebral peduncle and left quirino.\"  With brain compression  (minimal left to right shift), mild hydrocephalus as evidenced by imaging  S/p LP on 3/31  Follow-up cytology, culture leukemia/lymphoma panel  Neurosurgery and oncology following, input reviewed  Awaiting LP testing to discuss further treatment-holding steroids pending above workup  Encephalopathy  Due to brain mass  Patient with acute onset confusion, forgetting names/places, oriented to self only  Delirium precaution  Appears to be improving  Type 2 diabetes mellitus with hyperglycemia, without long-term current use of insulin (HCC)  Has been well-controlled as outpatient with hemoglobin A1c of 6.6%  Hold home oral medications, start correctional insulin coverage with Accu-Cheks while admitted  Continue hypoglycemia protocol and carb controlled diet  Mixed hyperlipidemia  Chronic and stable, continue PTA statin   HTN, goal below 130/80  Blood pressure acceptable, continue PTA losartan/HCTZ with strict hold parameters and monitor blood pressure per protocol  Liver lesion  MRI obtained: No suspicious hepatic lesions.  There is a simple right hepatic lobe " cyst.  Mild diffuse hepatic steatosis.  LFTs stable  Outpatient follow up advised  Thyroid nodule  Incidental findings on CT scan  Will need nonemergent thyroid ultrasound for follow-up in the outpatient setting  Pulmonary nodule  4 mm right lower lobe pulmonary nodule.  3-month follow-up CT chest advised  Constipation  Continue senna MiraLAX  Ambulate patient as able  Ambulatory dysfunction  PT/OT recommending level 2 at discharge  Fall precautions    VTE Pharmacologic Prophylaxis: VTE Score: 5 High Risk (Score >/= 5) - Pharmacological DVT Prophylaxis Contraindicated. Sequential Compression Devices Ordered.    Mobility:   Basic Mobility Inpatient Raw Score: 18  JH-HLM Goal: 6: Walk 10 steps or more  JH-HLM Achieved: 7: Walk 25 feet or more  JH-HLM Goal achieved. Continue to encourage appropriate mobility.    Patient Centered Rounds: I performed bedside rounds with nursing staff today.   Discussions with Specialists or Other Care Team Provider: , neurosurgery    Education and Discussions with Family / Patient: Updated  (daughter) via phone.    Current Length of Stay: 4 day(s)  Current Patient Status: Inpatient   Certification Statement: The patient will continue to require additional inpatient hospital stay due to as above  Discharge Plan: Anticipate discharge in >72 hrs to rehab facility.    Code Status: Level 1 - Full Code    Subjective   No events overnight.  Patient has no complaints this morning.  Tolerating oral intake with no nausea or vomiting.  Denies dizziness, lightheadedness or visual disturbances.    Objective :  Temp:  [98.6 °F (37 °C)-99.1 °F (37.3 °C)] 99 °F (37.2 °C)  HR:  [54-71] 64  BP: (107-124)/(57-76) 120/76  Resp:  [16-18] 16  SpO2:  [95 %-97 %] 96 %  O2 Device: None (Room air)    Body mass index is 27.26 kg/m².     Input and Output Summary (last 24 hours):     Intake/Output Summary (Last 24 hours) at 4/2/2025 1051  Last data filed at 4/1/2025 2000  Gross per 24 hour    Intake 795 ml   Output --   Net 795 ml       Physical Exam  Vitals and nursing note reviewed.   Constitutional:       General: He is not in acute distress.     Appearance: He is well-developed.   HENT:      Head: Normocephalic and atraumatic.   Eyes:      Conjunctiva/sclera: Conjunctivae normal.   Cardiovascular:      Rate and Rhythm: Normal rate and regular rhythm.   Pulmonary:      Effort: No respiratory distress.      Breath sounds: Normal breath sounds. No wheezing or rhonchi.   Musculoskeletal:         General: No swelling.      Cervical back: Neck supple.   Skin:     General: Skin is warm and dry.      Capillary Refill: Capillary refill takes less than 2 seconds.   Neurological:      Mental Status: He is alert.   Psychiatric:         Mood and Affect: Mood normal.         Behavior: Behavior normal.           Lines/Drains:          Lab Results: I have reviewed the following results:   Results from last 7 days   Lab Units 04/01/25  0535 03/31/25  0446 03/29/25 2019   WBC Thousand/uL 7.15   < > 7.36   HEMOGLOBIN g/dL 14.0   < > 14.6   HEMATOCRIT % 39.2   < > 41.5   PLATELETS Thousands/uL 184   < > 209   SEGS PCT %  --   --  58   LYMPHO PCT %  --   --  32   MONO PCT %  --   --  7   EOS PCT %  --   --  2    < > = values in this interval not displayed.     Results from last 7 days   Lab Units 04/01/25  0535   SODIUM mmol/L 140   POTASSIUM mmol/L 4.0   CHLORIDE mmol/L 107   CO2 mmol/L 26   BUN mg/dL 15   CREATININE mg/dL 1.03   ANION GAP mmol/L 7   CALCIUM mg/dL 8.9   GLUCOSE RANDOM mg/dL 115     Results from last 7 days   Lab Units 03/31/25  0446   INR  1.00     Results from last 7 days   Lab Units 04/02/25  0610 04/01/25  2104 04/01/25  1635 04/01/25  1101 04/01/25  0626 03/31/25  2059 03/31/25  1610 03/31/25  1054 03/31/25  1048 03/31/25  0728 03/31/25  0642 03/30/25 2059   POC GLUCOSE mg/dl 107 149* 164* 211* 120 138 127 139 174* 177* 121 116               Recent Cultures (last 7 days):   Results from last 7  days   Lab Units 03/31/25  1237   GRAM STAIN RESULT  No Polys or Bacteria seen  Rare Mononuclear Cells       Imaging Results Review: No pertinent imaging studies reviewed.  Other Study Results Review: No additional pertinent studies reviewed.    Last 24 Hours Medication List:     Current Facility-Administered Medications:     acetaminophen (TYLENOL) tablet 650 mg, Q6H PRN    aluminum-magnesium hydroxide-simethicone (MAALOX) oral suspension 30 mL, Q4H PRN    [Held by provider] aspirin (ECOTRIN LOW STRENGTH) EC tablet 81 mg, Daily    atorvastatin (LIPITOR) tablet 20 mg, Daily    Cholecalciferol (VITAMIN D3) tablet 2,000 Units, Daily    cyanocobalamin (VITAMIN B-12) tablet 1,000 mcg, Daily    losartan (COZAAR) tablet 100 mg, Daily **AND** hydroCHLOROthiazide tablet 25 mg, Daily    insulin lispro (HumALOG/ADMELOG) 100 units/mL subcutaneous injection 1-5 Units, HS    insulin lispro (HumALOG/ADMELOG) 100 units/mL subcutaneous injection 1-6 Units, TID AC **AND** Fingerstick Glucose (POCT), TID AC    ondansetron (ZOFRAN) injection 4 mg, Q6H PRN    pantoprazole (PROTONIX) EC tablet 20 mg, Early Morning    polyethylene glycol (MIRALAX) packet 17 g, Daily    senna (SENOKOT) tablet 17.2 mg, HS    sodium chloride 0.9 % infusion, Continuous, Last Rate: 75 mL/hr (04/01/25 1151)    Administrative Statements   Today, Patient Was Seen By: Dionne Huang MD      **Please Note: This note may have been constructed using a voice recognition system.**

## 2025-04-02 NOTE — ASSESSMENT & PLAN NOTE
MRI obtained: No suspicious hepatic lesions.  There is a simple right hepatic lobe cyst.  Mild diffuse hepatic steatosis.  LFTs stable  Outpatient follow up advised

## 2025-04-02 NOTE — ASSESSMENT & PLAN NOTE
"Patient with development of worsening confusion, recently seen at the [facility] ED 3/24/2025 with CT head at that time with finding of brain lesion for which MRI was advised to exclude cancerous etiology.  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

## 2025-04-02 NOTE — ASSESSMENT & PLAN NOTE
Due to brain mass  Patient with acute onset confusion, forgetting names/places, oriented to self only  Delirium precaution  Appears to be improving

## 2025-04-03 ENCOUNTER — APPOINTMENT (INPATIENT)
Dept: RADIOLOGY | Facility: HOSPITAL | Age: 65
DRG: 820 | End: 2025-04-03
Payer: COMMERCIAL

## 2025-04-03 LAB
BACTERIA CSF CULT: NO GROWTH
GLUCOSE SERPL-MCNC: 108 MG/DL (ref 65–140)
GLUCOSE SERPL-MCNC: 117 MG/DL (ref 65–140)
GLUCOSE SERPL-MCNC: 152 MG/DL (ref 65–140)
GLUCOSE SERPL-MCNC: 97 MG/DL (ref 65–140)

## 2025-04-03 PROCEDURE — 97535 SELF CARE MNGMENT TRAINING: CPT

## 2025-04-03 PROCEDURE — 82948 REAGENT STRIP/BLOOD GLUCOSE: CPT

## 2025-04-03 PROCEDURE — 99233 SBSQ HOSP IP/OBS HIGH 50: CPT | Performed by: NEUROLOGICAL SURGERY

## 2025-04-03 PROCEDURE — 70450 CT HEAD/BRAIN W/O DYE: CPT

## 2025-04-03 PROCEDURE — 99233 SBSQ HOSP IP/OBS HIGH 50: CPT | Performed by: STUDENT IN AN ORGANIZED HEALTH CARE EDUCATION/TRAINING PROGRAM

## 2025-04-03 RX ORDER — ENOXAPARIN SODIUM 100 MG/ML
40 INJECTION SUBCUTANEOUS
Status: DISCONTINUED | OUTPATIENT
Start: 2025-04-03 | End: 2025-04-10

## 2025-04-03 RX ADMIN — SENNOSIDES 17.2 MG: 8.6 TABLET, FILM COATED ORAL at 22:04

## 2025-04-03 RX ADMIN — HYDROCHLOROTHIAZIDE 25 MG: 25 TABLET ORAL at 09:01

## 2025-04-03 RX ADMIN — Medication 2000 UNITS: at 09:01

## 2025-04-03 RX ADMIN — LOSARTAN POTASSIUM 100 MG: 50 TABLET, FILM COATED ORAL at 09:01

## 2025-04-03 RX ADMIN — INSULIN LISPRO 1 UNITS: 100 INJECTION, SOLUTION INTRAVENOUS; SUBCUTANEOUS at 11:01

## 2025-04-03 RX ADMIN — ATORVASTATIN CALCIUM 20 MG: 20 TABLET, FILM COATED ORAL at 09:01

## 2025-04-03 RX ADMIN — SODIUM CHLORIDE 75 ML/HR: 0.9 INJECTION, SOLUTION INTRAVENOUS at 23:17

## 2025-04-03 RX ADMIN — CYANOCOBALAMIN TAB 500 MCG 1000 MCG: 500 TAB at 09:01

## 2025-04-03 RX ADMIN — SODIUM CHLORIDE 75 ML/HR: 0.9 INJECTION, SOLUTION INTRAVENOUS at 09:00

## 2025-04-03 RX ADMIN — PANTOPRAZOLE SODIUM 20 MG: 20 TABLET, DELAYED RELEASE ORAL at 05:14

## 2025-04-03 RX ADMIN — ENOXAPARIN SODIUM 40 MG: 40 INJECTION SUBCUTANEOUS at 11:01

## 2025-04-03 NOTE — OCCUPATIONAL THERAPY NOTE
Occupational Therapy Progress Note     Patient Name: Alexis Dennison  Today's Date: 4/3/2025  Problem List  Principal Problem:    Brain tumor (HCC)  Active Problems:    Type 2 diabetes mellitus with hyperglycemia, without long-term current use of insulin (HCC)    HTN, goal below 130/80    Mixed hyperlipidemia    Thyroid nodule    Pulmonary nodule    Liver lesion    Constipation    Ambulatory dysfunction    Encephalopathy        04/03/25 0857   OT Last Visit   OT Visit Date 04/03/25   Note Type   Note Type Treatment   Pain Assessment   Pain Assessment Tool 0-10   Pain Score No Pain   Restrictions/Precautions   Weight Bearing Precautions Per Order No   Other Precautions Cognitive;Bed Alarm;Chair Alarm;Fall Risk;Telemetry  (Cognitive impairment with session, expressive aphasia/occasional word finding difficulties)   Lifestyle   Autonomy I with ADL's/IADL's, no AD with functional mobility +drives   Reciprocal Relationships spouse, children   Service to Others full time employement   Intrinsic Gratification (playing board games)   ADL   Grooming Assistance 5  Supervision/Setup   Grooming Deficit Setup;Wash/dry face;Wash/dry hands   UB Bathing Assistance 4  Minimal Assistance   UB Bathing Deficit Setup  (for task initation and thoroughness)   UB Dressing Assistance 2  Maximal Assistance   UB Dressing Deficit Thread LUE;Thread RUE;Pull down in back;Pull around back  (pt required verbal cues for problem solving gown donning/assistance to thread UE's through sleeved and pull down to complete)   Toileting Assistance  5  Supervision/Setup   Toileting Deficit (standing to urinate)   Bed Mobility   Supine to Sit 5  Supervision   Additional items Assist x 1;HOB elevated   Transfers   Sit to Stand 5  Supervision   Additional items Assist x 1   Stand to Sit 5  Supervision   Additional items Assist x 1   Toilet transfer 4  Minimal assistance   Additional items Assist x 1;Increased time required;Standard toilet  (min a for CG/CS for  "safety)   Functional Mobility   Functional Mobility 4  Minimal assistance   Additional Comments min a for CG without AD short household distance functional mobility to access/transport newspaper, impaired STM/working memory with functional tasks~step by step cognitive assist to anticapte next step with functional tasks, unable to recall room number and locate room with mobility, cognitive impairment impedes I levels.   Toilet Transfers   Toilet Transfer From (no AD)   Toilet Transfer Type To and from   Toilet Transfer to Standard toilet   Toilet Transfer Technique Ambulating  (standing)   Toilet Transfers Contact guard   Cognition   Overall Cognitive Status (S)  Impaired   Arousal/Participation Alert;Responsive;Cooperative   Attention Attends with cues to redirect   Orientation Level Oriented to person;Disoriented to place;Disoriented to time;Disoriented to situation   Memory Decreased recall of precautions;Decreased recall of recent events;Decreased short term memory;Decreased recall of biographical information;Decreased long term memory   Following Commands Follows one step commands with increased time or repetition   Comments pt cooperative, flat affect, slow/delayed processing, impaired STM>LTM, appeared to demonstrated expressive aphasia with occasional word finding difficulties able to state \"lee\" not wives name, poor problem solving, topographical orienation ~memory deficits contributing   Plan   Treatment Interventions ADL retraining;Functional transfer training;UE strengthening/ROM;Endurance training;Cognitive reorientation;Patient/family training;Compensatory technique education;Continued evaluation;Activityengagement   Goal Expiration Date 04/15/25   OT Treatment Day 1   OT Frequency 2-3x/wk   Discharge Recommendation   Rehab Resource Intensity Level, OT II (Moderate Resource Intensity)   AM-PAC Daily Activity Inpatient   Lower Body Dressing 2   Bathing 3   Toileting 3   Upper Body Dressing 3   Grooming " 3   Eating 3   Daily Activity Raw Score 17   Daily Activity Standardized Score (Calc for Raw Score >=11) 37.26   AM-PAC Applied Cognition Inpatient   Following a Speech/Presentation 1   Understanding Ordinary Conversation 3   Taking Medications 1   Remembering Where Things Are Placed or Put Away 1   Remembering List of 4-5 Errands 1   Taking Care of Complicated Tasks 1   Applied Cognition Raw Score 8   Applied Cognition Standardized Score 19.32   End of Consult   Patient Position at End of Consult Bedside chair;Bed/Chair alarm activated;All needs within reach   Nurse Communication Nurse aware of consult     Yuridia Andrade OTR/L

## 2025-04-03 NOTE — ASSESSMENT & PLAN NOTE
PT/OT recommending level 2 at discharge  Awaiting medically stability  Ambulate patient as able  Fall precautions

## 2025-04-03 NOTE — ASSESSMENT & PLAN NOTE
Multifocal lesions  P/w confusion, short term memory loss.    Imaging:  MRI Brain 3/26/25: Multiple scattered foci of subependymal nodular enhancement throughout ventricles (left worse than right) with mild hydrocephalus (left worse than right), scattered nodular areas of enhancement in left anterior inferior basal ganglia involving left anterior commissure, and smaller foci of nodular enhancement along anteromedial aspect of left cerebral peduncle and left quirino. Differential includes lymphoma, metastasis, multicentric high-grade glioma with subependymal spread of tumor, among other differentials. Recommend neurosurgical consultation for further evaluation. Consider correlation with CSF analysis.    Plan:  Continue to monitor neuro exam closely.  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  Hem/onc requesting biopsy to confirm - unfortunately location of lesion would be difficult to biopsy and could result in significant morbidity.  Ongoing discussion with family/SLIM/hem/onc regarding necessity.  Hold off on steroid management until Dx achieved.  Mobilize as tolerated.  DVT ppx: SCDs.    Neurosurgery will continue with ongoing discussion regarding utility of biopsy.  Call with questions.

## 2025-04-03 NOTE — ASSESSMENT & PLAN NOTE
Lab Results   Component Value Date    HGBA1C 6.6 (H) 02/22/2025       Recent Labs     04/02/25  1605 04/02/25  2103 04/03/25  0705 04/03/25  1037   POCGLU 132 131 108 152*       Blood Sugar Average: Last 72 hrs:  (P) 146.125

## 2025-04-03 NOTE — ASSESSMENT & PLAN NOTE
Due to brain mass  Patient with acute onset confusion, forgetting names/places, oriented to self only  Delirium precaution  Mentation appears worse today  CT obtained as above

## 2025-04-03 NOTE — PLAN OF CARE
Problem: PAIN - ADULT  Goal: Verbalizes/displays adequate comfort level or baseline comfort level  Description: Interventions:- Encourage patient to monitor pain and request assistance- Assess pain using appropriate pain scale- Administer analgesics based on type and severity of pain and evaluate response- Implement non-pharmacological measures as appropriate and evaluate response- Notify physician/advanced practitioner if interventions unsuccessful or patient reports new pain  Outcome: Progressing     Problem: INFECTION - ADULT  Goal: Absence or prevention of progression during hospitalization  Description: INTERVENTIONS:- Assess and monitor for signs and symptoms of infection- Monitor lab/diagnostic results- Monitor all insertion sites, i.e. indwelling lines, tubes, and drains- [place] appropriate cooling/warming therapies per order- Administer medications as ordered- Instruct and encourage patient and family to use good hand hygiene technique- Identify and instruct in appropriate isolation precautions for identified infection/condition  Outcome: Progressing     Problem: DISCHARGE PLANNING  Goal: Discharge to home or other facility with appropriate resources  Description: INTERVENTIONS:- Identify barriers to discharge w/patient and caregiver- Arrange for needed discharge resources and transportation as appropriate- Identify discharge learning needs (meds, wound care, etc.)- Refer to Case Management Department for coordinating discharge planning if the patient needs post-hospital services based on physician/advanced practitioner order or complex needs related to functional status, cognitive ability, or social support system  Outcome: Progressing     Problem: Knowledge Deficit  Goal: Patient/family/caregiver demonstrates understanding of disease process, treatment plan, medications, and discharge instructions  Description: Complete learning assessment and assess knowledge base.Interventions:- Provide teaching at  level of understanding- Provide teaching via preferred learning methods  Outcome: Progressing     Problem: NEUROSENSORY - ADULT  Goal: Achieves stable or improved neurological status  Description: INTERVENTIONS- Monitor and report changes in neurological status- Monitor vital signs such as temperature, blood pressure, glucose, and any other labs ordered - Initiate measures to prevent increased intracranial pressure- Monitor for seizure activity and implement precautions if appropriate    Outcome: Progressing  Goal: Remains free of injury related to seizures activity  Description: INTERVENTIONS- Maintain airway, patient safety  and administer oxygen as ordered- Monitor patient for seizure activity, document and report duration and description of seizure to physician/advanced practitioner- If seizure occurs,  ensure patient safety during seizure- Reorient patient post seizure- Seizure pads on all 4 side rails- Instruct patient/family to notify RN of any seizure activity including if an aura is experienced- Instruct patient/family to call for assistance with activity based on nursing assessment- Administer anti-seizure medications if ordered  Outcome: Progressing  Goal: Achieves maximal functionality and self care  Description: INTERVENTIONS- Monitor swallowing and airway patency with patient fatigue and changes in neurological status- Encourage and assist patient to increase activity and self care. - Encourage visually impaired, hearing impaired and aphasic patients to use assistive/communication devices  Outcome: Progressing     Problem: METABOLIC, FLUID AND ELECTROLYTES - ADULT  Goal: Glucose maintained within target range  Description: INTERVENTIONS:- Monitor Blood Glucose as ordered- Assess for signs and symptoms of hyperglycemia and hypoglycemia- Administer ordered medications to maintain glucose within target range- Assess nutritional intake and initiate nutrition service referral as needed  Outcome:  Progressing

## 2025-04-03 NOTE — PROGRESS NOTES
Progress Note - Neurosurgery   Name: Alexis Dennison 64 y.o. male I MRN: [id number]  Unit/Bed#: [facility] 705-01 I Date of Admission: 3/29/2025   Date of Service: 4/3/2025 I Hospital Day: 5    Assessment & Plan  Brain tumor (HCC)  Multifocal lesions  P/w confusion, short term memory loss.    Imaging:  MRI Brain 3/26/25: Multiple scattered foci of subependymal nodular enhancement throughout ventricles (left worse than right) with mild hydrocephalus (left worse than right), scattered nodular areas of enhancement in left anterior inferior basal ganglia involving left anterior commissure, and smaller foci of nodular enhancement along anteromedial aspect of left cerebral peduncle and left quirino. Differential includes lymphoma, metastasis, multicentric high-grade glioma with subependymal spread of tumor, among other differentials. Recommend neurosurgical consultation for further evaluation. Consider correlation with CSF analysis.    Plan:  Continue to monitor neuro exam closely.  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  Hem/onc requesting biopsy to confirm - unfortunately location of lesion would be difficult to biopsy and could result in significant morbidity.  Ongoing discussion with family/SLIM/hem/onc regarding necessity.  Hold off on steroid management until Dx achieved.  Mobilize as tolerated.  DVT ppx: SCDs.    Neurosurgery will continue with ongoing discussion regarding utility of biopsy.  Call with questions.  Type 2 diabetes mellitus with hyperglycemia, without long-term current use of insulin (HCC)  Lab Results   Component Value Date    HGBA1C 6.6 (H) 02/22/2025       Recent Labs     04/02/25  1605 04/02/25  2103 04/03/25  0705 04/03/25  1037   POCGLU 132 131 108 152*       Blood Sugar Average: Last 72 hrs:  (P) 146.125    Thyroid nodule    Pulmonary nodule    Liver lesion  MRI of abdomen confirming simple hepatic cyst.  Constipation    Ambulatory  "dysfunction    Encephalopathy      I have discussed the above management plan in detail with the primary service.   Please contact the SecureChat role for the Neurosurgery service with any questions/concerns.    Subjective   Cannot state date.  States we are in \"[place].\"    Objective :  Temp:  [97.8 °F (36.6 °C)-98.3 °F (36.8 °C)] 98.3 °F (36.8 °C)  HR:  [53-65] 65  BP: (111-128)/(68-84) 128/76  Resp:  [18-20] 18  SpO2:  [95 %-98 %] 95 %    I/O         04/01 0701  04/02 0700 04/02 0701  04/03 0700 04/03 0701  04/04 0700    P.O. 420 240     I.V. (mL/kg) 375 (4.4) 300 (3.5) 150 (1.7)    Total Intake(mL/kg) 795 (9.2) 540 (6.3) 150 (1.7)    Net +795 +540 +150           Unmeasured Urine Occurrence 3 x  2 x          Physical Exam  Vitals and nursing note reviewed.   Constitutional:       General: He is not in acute distress.     Appearance: He is well-developed.   HENT:      Head: Normocephalic and atraumatic.   Eyes:      General: Lids are normal.      Extraocular Movements: Extraocular movements intact.      Conjunctiva/sclera: Conjunctivae normal.      Pupils: Pupils are equal, round, and reactive to light.   Cardiovascular:      Rate and Rhythm: Normal rate and regular rhythm.      Heart sounds: No murmur heard.  Pulmonary:      Effort: Pulmonary effort is normal. No respiratory distress.      Breath sounds: Normal breath sounds.   Abdominal:      Palpations: Abdomen is soft.      Tenderness: There is no abdominal tenderness.   Musculoskeletal:         General: No swelling.      Cervical back: Neck supple.   Skin:     General: Skin is warm and dry.      Capillary Refill: Capillary refill takes less than 2 seconds.   Neurological:      Mental Status: He is alert.      Motor: Motor strength is normal.  Psychiatric:         Mood and Affect: Mood normal.      Neurological Exam  Mental Status  Alert. Oriented only to person. Able to perform serial calculations.  Stated it was February 20[birth year]..    Cranial Nerves  CN II: " Visual acuity is normal. Visual fields full to confrontation.  CN III, IV, VI: Extraocular movements intact bilaterally. Normal lids and orbits bilaterally. Pupils equal round and reactive to light bilaterally.  CN V: Facial sensation is normal.  CN VII: Full and symmetric facial movement.  CN VIII: Hearing is normal.  CN IX, X: Palate elevates symmetrically. Normal gag reflex.  CN XI: Shoulder shrug strength is normal.  CN XII: Tongue midline without atrophy or fasciculations.    Motor   Strength is 5/5 throughout all four extremities.        Lab Results: I have reviewed the following results:  Recent Labs     04/01/25  0535   WBC 7.15   HGB 14.0   HCT 39.2      SODIUM 140   K 4.0      CO2 26   BUN 15   CREATININE 1.03   GLUC 115   MG 1.9       Imaging Results Review: I personally reviewed the following image studies in PACS and associated radiology reports: MRI brain. My interpretation of the radiology images/reports is: as above.  Other Study Results Review: No additional pertinent studies reviewed.    VTE Pharmacologic Prophylaxis: VTE covered by:  [Held by provider] enoxaparin, Subcutaneous, 40 mg at 04/03/25 1101    and Sequential compression device (Venodyne)

## 2025-04-03 NOTE — ASSESSMENT & PLAN NOTE
"Patient with development of worsening confusion, recently seen at the [facility] ED 3/24/2025 with CT head at that time with finding of brain lesion for which MRI was advised to exclude cancerous etiology.     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  Follow lymphoma/leukemia panel  Patient appeared more confused on my evaluation this morning, was repeating \"2\" to all questions  CT head ordered  Discussed with neurosurgery and oncology-will evaluate and discuss next steps  Continue neurochecks every 4 hours for now  "

## 2025-04-03 NOTE — PROGRESS NOTES
"Progress Note - Hospitalist   Name: Alexis Dennison 64 y.o. male I MRN: [id number]  Unit/Bed#: [facility] 705-01 I Date of Admission: 3/29/2025   Date of Service: 4/3/2025 I Hospital Day: 5    Assessment & Plan  Brain tumor (HCC)  Patient with development of worsening confusion, recently seen at the [facility] ED 3/24/2025 with CT head at that time with finding of brain lesion for which MRI was advised to exclude cancerous etiology.     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  Follow lymphoma/leukemia panel  Patient appeared more confused on my evaluation this morning, was repeating \"2\" to all questions  CT head ordered  Discussed with neurosurgery and oncology-will evaluate and discuss next steps  Continue neurochecks every 4 hours for now  Encephalopathy  Due to brain mass  Patient with acute onset confusion, forgetting names/places, oriented to self only  Delirium precaution  Mentation appears worse today  CT obtained as above  Type 2 diabetes mellitus with hyperglycemia, without long-term current use of insulin (HCC)  Has been well-controlled as outpatient with hemoglobin A1c of 6.6%  Hold home oral medications, start correctional insulin coverage with Accu-Cheks while admitted  Continue hypoglycemia protocol and carb controlled diet  Mixed hyperlipidemia  Chronic and stable, continue PTA statin   HTN, goal below 130/80  Continue losartan and-hydrochlorothiazide  BP reviewed and acceptable  Liver " "lesion  MRI obtained: No suspicious hepatic lesions.  There is a simple right hepatic lobe cyst.  Mild diffuse hepatic steatosis.  LFTs stable  Outpatient follow up advised  Thyroid nodule  Incidental findings on CT scan  Will need nonemergent thyroid ultrasound for follow-up in the outpatient setting  Pulmonary nodule  Imaging with 4 mm right lower lobe pulmonary nodule.  3-month follow-up CT chest advised  Constipation  Continue senna MiraLAX  Ambulate patient as able  Ambulatory dysfunction  PT/OT recommending level 2 at discharge  Awaiting medically stability  Ambulate patient as able  Fall precautions    VTE Pharmacologic Prophylaxis: VTE Score: 5 High Risk (Score >/= 5) - Pharmacological DVT Prophylaxis Ordered: enoxaparin (Lovenox). Sequential Compression Devices Ordered.    Mobility:   Basic Mobility Inpatient Raw Score: 18  JH-HLM Goal: 6: Walk 10 steps or more  JH-HLM Achieved: 6: Walk 10 steps or more  JH-HLM Goal achieved. Continue to encourage appropriate mobility.    Patient Centered Rounds: I performed bedside rounds with nursing staff today.   Discussions with Specialists or Other Care Team Provider: , oncology, neurosurgery    Education and Discussions with Family / Patient: Updated  (daughter) via phone.    Current Length of Stay: 5 day(s)  Current Patient Status: Inpatient   Certification Statement: The patient will continue to require additional inpatient hospital stay due to as above  Discharge Plan: Anticipate discharge in >72 hrs to rehab facility.    Code Status: Level 1 - Full Code    Subjective   No events overnight.  Patient reports feeling well this morning.  He does look more confused and takes more time to answer questions.  Most of his answers are the number \"2\"    Objective :  Temp:  [97.8 °F (36.6 °C)-98.3 °F (36.8 °C)] 98.3 °F (36.8 °C)  HR:  [53-65] 65  BP: (111-128)/(68-84) 128/76  Resp:  [18-20] 18  SpO2:  [95 %-98 %] 95 %    Body mass index is 27.26 " kg/m².     Input and Output Summary (last 24 hours):     Intake/Output Summary (Last 24 hours) at 4/3/2025 1050  Last data filed at 4/3/2025 0900  Gross per 24 hour   Intake 690 ml   Output --   Net 690 ml       Physical Exam  Vitals and nursing note reviewed.   Constitutional:       General: He is not in acute distress.     Appearance: He is well-developed.   HENT:      Head: Normocephalic and atraumatic.   Eyes:      Conjunctiva/sclera: Conjunctivae normal.   Cardiovascular:      Rate and Rhythm: Normal rate and regular rhythm.   Pulmonary:      Effort: No respiratory distress.      Breath sounds: Normal breath sounds. No wheezing or rhonchi.   Musculoskeletal:         General: No swelling.      Cervical back: Neck supple.   Skin:     General: Skin is warm and dry.      Capillary Refill: Capillary refill takes less than 2 seconds.   Neurological:      Mental Status: He is alert. He is disoriented.           Lines/Drains:          Lab Results: I have reviewed the following results:   Results from last 7 days   Lab Units 04/01/25  0535 03/31/25  0446 03/29/25 2019   WBC Thousand/uL 7.15   < > 7.36   HEMOGLOBIN g/dL 14.0   < > 14.6   HEMATOCRIT % 39.2   < > 41.5   PLATELETS Thousands/uL 184   < > 209   SEGS PCT %  --   --  58   LYMPHO PCT %  --   --  32   MONO PCT %  --   --  7   EOS PCT %  --   --  2    < > = values in this interval not displayed.     Results from last 7 days   Lab Units 04/01/25  0535   SODIUM mmol/L 140   POTASSIUM mmol/L 4.0   CHLORIDE mmol/L 107   CO2 mmol/L 26   BUN mg/dL 15   CREATININE mg/dL 1.03   ANION GAP mmol/L 7   CALCIUM mg/dL 8.9   GLUCOSE RANDOM mg/dL 115     Results from last 7 days   Lab Units 03/31/25  0446   INR  1.00     Results from last 7 days   Lab Units 04/03/25  1037 04/03/25  0705 04/02/25  2103 04/02/25  1605 04/02/25  1110 04/02/25  0610 04/01/25  2104 04/01/25  1635 04/01/25  1101 04/01/25  0626 03/31/25  2059 03/31/25  1610   POC GLUCOSE mg/dl 152* 108 131 132 188* 107  149* 164* 211* 120 138 127               Recent Cultures (last 7 days):   Results from last 7 days   Lab Units 03/31/25  1237   GRAM STAIN RESULT  No Polys or Bacteria seen  Rare Mononuclear Cells       Imaging Results Review: I personally reviewed the following image studies in PACS and associated radiology reports: CT head. My interpretation of the radiology images/reports is: Known mass, midline shift with  hydrocephalus.  Other Study Results Review: No additional pertinent studies reviewed.    Last 24 Hours Medication List:     Current Facility-Administered Medications:     acetaminophen (TYLENOL) tablet 650 mg, Q6H PRN    aluminum-magnesium hydroxide-simethicone (MAALOX) oral suspension 30 mL, Q4H PRN    [Held by provider] aspirin (ECOTRIN LOW STRENGTH) EC tablet 81 mg, Daily    atorvastatin (LIPITOR) tablet 20 mg, Daily    Cholecalciferol (VITAMIN D3) tablet 2,000 Units, Daily    cyanocobalamin (VITAMIN B-12) tablet 1,000 mcg, Daily    enoxaparin (LOVENOX) subcutaneous injection 40 mg, Q24H CAIT    losartan (COZAAR) tablet 100 mg, Daily **AND** hydroCHLOROthiazide tablet 25 mg, Daily    insulin lispro (HumALOG/ADMELOG) 100 units/mL subcutaneous injection 1-5 Units, HS    insulin lispro (HumALOG/ADMELOG) 100 units/mL subcutaneous injection 1-6 Units, TID AC **AND** Fingerstick Glucose (POCT), TID AC    ondansetron (ZOFRAN) injection 4 mg, Q6H PRN    pantoprazole (PROTONIX) EC tablet 20 mg, Early Morning    polyethylene glycol (MIRALAX) packet 17 g, Daily    senna (SENOKOT) tablet 17.2 mg, HS    sodium chloride 0.9 % infusion, Continuous, Last Rate: 75 mL/hr (04/03/25 0900)    Administrative Statements   Today, Patient Was Seen By: Dionne Huang MD      **Please Note: This note may have been constructed using a voice recognition system.**

## 2025-04-03 NOTE — UTILIZATION REVIEW
"Continued Stay Review    Date:    4/3                          Current Patient Class: Inpatient  Current Level of Care:  med surg    HPI:64 y.o. male initially admitted on    3/29/25     Current Diagnosis: Brain tumor  S/P  LP  3/31  Cytology   suspected  CNS lymphoma.      Assessment/Plan:   4/3   Continue  PT/OT/fall precautions.   Continue neuro checks  Q 4 hrs.  Seems  more confused this am, taking more time to answer questions.  Most  answers are the  number  \"2.\".   Continue current meds.  Further  plan per  neurosurgery.,      Medications:   Scheduled Medications:  [Held by provider] aspirin, 81 mg, Oral, Daily  atorvastatin, 20 mg, Oral, Daily  Cholecalciferol, 2,000 Units, Oral, Daily  vitamin B-12, 1,000 mcg, Oral, Daily  enoxaparin, 40 mg, Subcutaneous, Q24H CAIT  losartan, 100 mg, Oral, Daily   And  hydroCHLOROthiazide, 25 mg, Oral, Daily  insulin lispro, 1-5 Units, Subcutaneous, HS  insulin lispro, 1-6 Units, Subcutaneous, TID AC  pantoprazole, 20 mg, Oral, Early Morning  polyethylene glycol, 17 g, Oral, Daily  senna, 2 tablet, Oral, HS      Continuous IV Infusions:  sodium chloride, 75 mL/hr, Intravenous, Continuous      PRN Meds:  acetaminophen, 650 mg, Oral, Q6H PRN  aluminum-magnesium hydroxide-simethicone, 30 mL, Oral, Q4H PRN  ondansetron, 4 mg, Intravenous, Q6H PRN    Neuro checks  Q 4 hrs      Discharge Plan:    TBD    Vital Signs (last 3 days)       Date/Time Temp Pulse Resp BP MAP (mmHg) SpO2 O2 Device Patient Position - Orthostatic VS Yeni Coma Scale Score Pain    04/03/25 0857 -- -- -- -- -- -- -- -- -- No Pain    04/03/25 0800 -- -- -- -- -- -- -- -- 14 No Pain    04/03/25 0716 98.3 °F (36.8 °C) 65 18 128/76 93 95 % -- -- -- --    04/03/25 0400 -- 53 18 111/68 82 -- -- -- -- --    04/03/25 0000 98.1 °F (36.7 °C) 59 18 122/84 97 -- -- Lying -- --    04/02/25 2000 97.8 °F (36.6 °C) 60 18 118/76 90 -- -- Lying 14 3    04/02/25 15:14:17 98.1 °F (36.7 °C) 60 20 127/75 92 98 % -- -- -- --    " 04/02/25 1120 -- -- -- -- -- -- -- -- -- 4    04/02/25 08:38:45 -- 64 -- 120/76 91 96 % -- -- -- --    04/02/25 0800 -- -- -- -- -- -- -- -- 14 No Pain    04/02/25 07:18:50 99 °F (37.2 °C) 71 16 122/61 81 95 % -- -- -- --    04/02/25 05:55:11 99.1 °F (37.3 °C) 54 -- 107/57 74 97 % -- -- -- --    04/02/25 0400 -- -- -- -- -- -- -- -- 14 --    04/02/25 0000 -- -- -- -- -- -- -- -- 14 --    04/01/25 2000 -- -- -- -- -- -- None (Room air) -- 14 No Pain    04/01/25 19:18:43 98.8 °F (37.1 °C) 56 18 123/68 86 96 % -- -- -- --    04/01/25 16:27:22 98.6 °F (37 °C) 60 18 124/68 87 97 % None (Room air) Lying -- --    04/01/25 1411 -- -- -- -- -- -- -- -- -- 10 - Worst Possible Pain    04/01/25 1331 -- -- -- -- -- -- -- -- -- No Pain    04/01/25 09:26:11 -- 66 -- 131/73 92 95 % -- -- -- --    04/01/25 0800 -- -- -- -- -- -- -- -- 14 No Pain    04/01/25 07:17:03 98.9 °F (37.2 °C) 54 18 108/59 75 97 % -- -- -- --    04/01/25 04:07:15 98.6 °F (37 °C) 58 -- 115/65 82 95 % -- -- -- --    04/01/25 00:25:34 98.4 °F (36.9 °C) 56 -- 119/69 86 97 % -- -- -- --    03/31/25 20:56:45 98.5 °F (36.9 °C) 61 -- 121/72 88 95 % -- -- -- --    03/31/25 2000 -- -- -- -- -- -- -- -- 14 No Pain    03/31/25 1600 -- -- -- -- -- -- -- -- 14 --    03/31/25 15:49:22 98.9 °F (37.2 °C) 65 16 120/72 88 96 % -- -- -- --    03/31/25 1200 -- -- -- -- -- -- -- -- 14 --    03/31/25 0800 -- -- -- -- -- -- -- -- 14 No Pain    03/31/25 07:23:07 99.3 °F (37.4 °C) 66 18 111/62 78 94 % -- -- -- --    03/31/25 0400 -- -- -- -- -- -- -- -- 14 --    03/31/25 0000 -- -- -- -- -- -- -- -- 14 --              Pertinent Labs/Diagnostic Results:   Radiology:  CT head wo contrast   Final Interpretation by Miranda Burr MD (04/03 5807)      Redemonstrated hyperattenuating subependymal masses better characterized on recent MR. Interval increased ventricular caliber, left greater than right in comparison to recent CT dated 3/29/2025 concerning for worsening hydrocephalus. Recommend     neurosurgical consultation.      The study was marked in EPIC for immediate notification.         Workstation performed: [id number]         FL IN-patient lumbar puncture   Final Interpretation by Elpidio Martinez MD (03/31 1448)      Successful fluoroscopically guided lumbar puncture.      Opening pressure: 15 cm of H20               Workstation performed: [id number]         MRI abdomen w wo contrast   Final Interpretation by Oswaldo Sewell MD (03/31 1021)      1.  No suspicious hepatic lesions. There is a simple right hepatic lobe cyst.   2.  Mild diffuse hepatic steatosis.         Workstation performed: [id number]         CT head wo contrast   Final Interpretation by Lonnie Arellano MD (03/29 2224)      Stable subependymal nodules and left foramen of De Oliveira region hyperdense lesion again seen with associated mild symmetrical dilatation of the left lateral ventricle and minimal left to right midline shift. The overall findings are unchanged compared to    the prior study. No acute intracranial hemorrhage or evidence of acute cerebral infarction.                  Workstation performed: [id number]         CTA chest abdomen pelvis w wo contrast   Final Interpretation by Oswaldo Sewell MD (03/29 2126)      1.  There is a nonspecific 12 mm hypodense lesion within the right hepatic lobe. While this may represent a hemangioma, a metastatic lesion can not be excluded. This is suboptimally evaluated on the current exam, due to arterial timing of the contrast    bolus and background diffuse hepatic steatosis. Further evaluation by gadolinium enhanced MRI of the abdomen is recommended.   2.  Nonspecific 4 mm right lower lobe pulmonary nodule. In the setting of a known malignancy, a 3-month follow-up chest CT could be performed for further evaluation.   3.  Heterogeneous nodular enlargement of the left thyroid lobe. A nonemergent thyroid ultrasound follow-up is recommended.      4.  Please refer to the  report body for description of other incidental, chronic and/or benign findings.      The study was marked in EPIC for significant notification.         Workstation performed: [id number]           Cardiology:      Results from last 7 days   Lab Units 04/01/25  0535 03/31/25 0446 03/29/25  2019   WBC Thousand/uL 7.15 7.08 7.36   HEMOGLOBIN g/dL 14.0 14.4 14.6   HEMATOCRIT % 39.2 41.5 41.5   PLATELETS Thousands/uL 184 189 209   TOTAL NEUT ABS Thousands/µL  --   --  4.31         Results from last 7 days   Lab Units 04/01/25  0535 03/31/25  0446 03/29/25 2019   SODIUM mmol/L 140 139 138   POTASSIUM mmol/L 4.0 3.9 3.9   CHLORIDE mmol/L 107 102 101   CO2 mmol/L 26 28 30   ANION GAP mmol/L 7 9 7   BUN mg/dL 15 17 24   CREATININE mg/dL 1.03 1.00 1.11   EGFR ml/min/1.73sq m 75 78 69   CALCIUM mg/dL 8.9 8.9 9.6   MAGNESIUM mg/dL 1.9 1.9  --          Results from last 7 days   Lab Units 04/03/25  1037 04/03/25  0705 04/02/25  2103 04/02/25  1605 04/02/25  1110 04/02/25  0610 04/01/25  2104 04/01/25  1635 04/01/25  1101 04/01/25  0626 03/31/25  2059 03/31/25  1610   POC GLUCOSE mg/dl 152* 108 131 132 188* 107 149* 164* 211* 120 138 127     Results from last 7 days   Lab Units 04/01/25  0535 03/31/25 0446 03/29/25  2019   GLUCOSE RANDOM mg/dL 115 122 105           Results from last 7 days   Lab Units 03/31/25  1237   GRAM STAIN RESULT  No Polys or Bacteria seen  Rare Mononuclear Cells     Results from last 7 days   Lab Units 03/31/25  1157   TOTAL COUNTED  100     Results from last 7 days   Lab Units 03/31/25  1157   APPEARANCE CSF  Clear   TUBE NUM CSF  3   WBC CSF /uL 106*   XANTHOCHROMIA  Yes*   NEUTROPHILS % (CSF) % 1   LYMPHS % (CSF) % 78   MONOCYTES % (CSF) % 21   GLUCOSE CSF mg/dL 52   PROTEIN CSF mg/dL 463*   RBC CSF uL 298*   CSF CULTURE  No growth           Network Utilization Review Department  ATTENTION: Please call with any questions or concerns to [phone] and carefully listen to the prompts so that you are  directed to the right person. All voicemails are confidential.   For Discharge needs, contact Care Management DC Support Team at [phone] opt. 2  Send all requests for admission clinical reviews, approved or denied determinations and any other requests to dedicated fax number below belonging to the [facility] where the patient is receiving treatment. List of dedicated fax numbers for the Facilities:  FACILITY NAME UR FAX NUMBER   ADMISSION DENIALS (Administrative/Medical Necessity) [phone]   DISCHARGE SUPPORT TEAM (NETWORK) [phone]   PARENT CHILD HEALTH (Maternity/NICU/Pediatrics) [phone]   [facility] [phone]   [facility] [phone]   [facility] [phone]   [facility] [phone]   [facility] [phone]   [facility] [phone]   [facility] [phone]   [facility] [phone]   [facility] [phone]   [facility] [phone]   [facility] [phone]   [facility] [phone]

## 2025-04-03 NOTE — PLAN OF CARE
Problem: OCCUPATIONAL THERAPY ADULT  Goal: Performs self-care activities at highest level of function for planned discharge setting.  See evaluation for individualized goals.  Description: Treatment Interventions: ADL retraining, Visual perceptual retraining, Functional transfer training, UE strengthening/ROM, Endurance training, Cognitive reorientation, Patient/family training, Equipment evaluation/education, Compensatory technique education, Continued evaluation, Energy conservation, Activityengagement, Fine motor coordination activities  Equipment Recommended:  (tbd)       See flowsheet documentation for full assessment, interventions and recommendations.   Note: Limitation: Decreased ADL status, Decreased cognition, Decreased Safe judgement during ADL, Decreased UE ROM, Decreased UE strength, Decreased endurance, Decreased fine motor control, Decreased self-care trans, Decreased high-level ADLs  Prognosis: Poor, Fair  Assessment: Pt is a 64 y.o. male who was admitted to [facility] on 3/29/2025 with with worsening confusion and now here with a Brain tumor (HCC) RI Brain 3/26/25: Multiple scattered foci of subependymal nodular enhancement throughout ventricles (left worse than right) with mild hydrocephalus (left worse than right), scattered nodular areas of enhancement in left anterior inferior basal ganglia involving left anterior commissure, and smaller foci of nodular enhancement along anteromedial aspect of left cerebral peduncle and left quirino . Patient   has a past medical history of Colon polyp, Diabetes mellitus (HCC), GERD (gastroesophageal reflux disease), Hyperlipidemia, Hypertension, Liver disease, and Sleep apnea.  At baseline pt was completing I with ADL's/IaDL's, no AD with functional mobility. Pt lives with spouse in a 2SH with [facility]. Currently pt requires min/mod a   for overall ADLS and min a for CG without aD  for functional mobility/transfers. Pt currently presents with impairments in  the following categories -difficulty performing ADLS and difficulty performing IADLS  activity tolerance. These impairments, as well as pt's fatigue  limit pt's ability to safely engage in all baseline areas of occupation, includingbathing, dressing, toileting, functional mobility/transfers, and community mobility From OT standpoint, recommend mod level II upon D/C. The patient's raw score on the -PAC Daily Activity Inpatient Short Form is 17. A raw score of less than 19 suggests the patient may benefit from discharge to post-acute rehabilitation services. Please refer to the recommendation of the Occupational Therapist for safe discharge planning. OT will continue to follow to address the below stated goals.     Rehab Resource Intensity Level, OT: II (Moderate Resource Intensity)

## 2025-04-04 ENCOUNTER — APPOINTMENT (INPATIENT)
Dept: RADIOLOGY | Facility: HOSPITAL | Age: 65
DRG: 820 | End: 2025-04-04
Payer: COMMERCIAL

## 2025-04-04 LAB
ALBUMIN MFR CSF ELPH: 67.1 % (ref 47.8–69.1)
ALBUMIN SERPL BCG-MCNC: 3.5 G/DL (ref 3.5–5)
ALP SERPL-CCNC: 41 U/L (ref 34–104)
ALPHA1 GLOB MFR CSF ELPH: 3.3 % (ref 2.6–7)
ALPHA2 GLOB MFR CSF ELPH: 6.5 % (ref 3.1–8.7)
ALT SERPL W P-5'-P-CCNC: 22 U/L (ref 7–52)
ANION GAP SERPL CALCULATED.3IONS-SCNC: 7 MMOL/L (ref 4–13)
AST SERPL W P-5'-P-CCNC: 11 U/L (ref 13–39)
B-GLOBULIN MFR CSF ELPH: 11.6 % (ref 11.8–21.7)
BASOPHILS # BLD AUTO: 0.03 THOUSANDS/ÂΜL (ref 0–0.1)
BASOPHILS NFR BLD AUTO: 0 % (ref 0–1)
BILIRUB SERPL-MCNC: 0.9 MG/DL (ref 0.2–1)
BUN SERPL-MCNC: 14 MG/DL (ref 5–25)
CALCIUM SERPL-MCNC: 8.5 MG/DL (ref 8.4–10.2)
CHLORIDE SERPL-SCNC: 107 MMOL/L (ref 96–108)
CO2 SERPL-SCNC: 27 MMOL/L (ref 21–32)
CREAT SERPL-MCNC: 1.02 MG/DL (ref 0.6–1.3)
EOSINOPHIL # BLD AUTO: 0.14 THOUSAND/ÂΜL (ref 0–0.61)
EOSINOPHIL NFR BLD AUTO: 2 % (ref 0–6)
ERYTHROCYTE [DISTWIDTH] IN BLOOD BY AUTOMATED COUNT: 11.6 % (ref 11.6–15.1)
GAMMA GLOB MFR CSF ELPH: 9.9 % (ref 2.8–8.5)
GFR SERPL CREATININE-BSD FRML MDRD: 76 ML/MIN/1.73SQ M
GLUCOSE SERPL-MCNC: 112 MG/DL (ref 65–140)
GLUCOSE SERPL-MCNC: 128 MG/DL (ref 65–140)
GLUCOSE SERPL-MCNC: 135 MG/DL (ref 65–140)
GLUCOSE SERPL-MCNC: 188 MG/DL (ref 65–140)
GLUCOSE SERPL-MCNC: 202 MG/DL (ref 65–140)
HCT VFR BLD AUTO: 39.6 % (ref 36.5–49.3)
HGB BLD-MCNC: 13.7 G/DL (ref 12–17)
IMM GRANULOCYTES # BLD AUTO: 0.01 THOUSAND/UL (ref 0–0.2)
IMM GRANULOCYTES NFR BLD AUTO: 0 % (ref 0–2)
INTERPRETATION CSF IFE-IMP: ABNORMAL
LYMPHOCYTES # BLD AUTO: 1.97 THOUSANDS/ÂΜL (ref 0.6–4.47)
LYMPHOCYTES NFR BLD AUTO: 27 % (ref 14–44)
MCH RBC QN AUTO: 30.9 PG (ref 26.8–34.3)
MCHC RBC AUTO-ENTMCNC: 34.6 G/DL (ref 31.4–37.4)
MCV RBC AUTO: 89 FL (ref 82–98)
MONOCYTES # BLD AUTO: 0.48 THOUSAND/ÂΜL (ref 0.17–1.22)
MONOCYTES NFR BLD AUTO: 7 % (ref 4–12)
NEUTROPHILS # BLD AUTO: 4.64 THOUSANDS/ÂΜL (ref 1.85–7.62)
NEUTS SEG NFR BLD AUTO: 64 % (ref 43–75)
NRBC BLD AUTO-RTO: 0 /100 WBCS
OLIGOCLONAL BANDS CSF ELPH-IMP: ABNORMAL %
PLATELET # BLD AUTO: 180 THOUSANDS/UL (ref 149–390)
PMV BLD AUTO: 10.2 FL (ref 8.9–12.7)
POTASSIUM SERPL-SCNC: 3.6 MMOL/L (ref 3.5–5.3)
PREALB MFR CSF ELPH: 1.5 % (ref 1–5)
PROT CSF-MCNC: 416.9 MG/DL (ref 0–44)
PROT SERPL-MCNC: 5.7 G/DL (ref 6.4–8.4)
RBC # BLD AUTO: 4.44 MILLION/UL (ref 3.88–5.62)
SCAN RESULT: NORMAL
SODIUM SERPL-SCNC: 141 MMOL/L (ref 135–147)
WBC # BLD AUTO: 7.27 THOUSAND/UL (ref 4.31–10.16)

## 2025-04-04 PROCEDURE — 80053 COMPREHEN METABOLIC PANEL: CPT | Performed by: STUDENT IN AN ORGANIZED HEALTH CARE EDUCATION/TRAINING PROGRAM

## 2025-04-04 PROCEDURE — 82948 REAGENT STRIP/BLOOD GLUCOSE: CPT

## 2025-04-04 PROCEDURE — 99233 SBSQ HOSP IP/OBS HIGH 50: CPT | Performed by: STUDENT IN AN ORGANIZED HEALTH CARE EDUCATION/TRAINING PROGRAM

## 2025-04-04 PROCEDURE — 97530 THERAPEUTIC ACTIVITIES: CPT

## 2025-04-04 PROCEDURE — 97116 GAIT TRAINING THERAPY: CPT

## 2025-04-04 PROCEDURE — 85025 COMPLETE CBC W/AUTO DIFF WBC: CPT | Performed by: STUDENT IN AN ORGANIZED HEALTH CARE EDUCATION/TRAINING PROGRAM

## 2025-04-04 RX ORDER — BISACODYL 10 MG
10 SUPPOSITORY, RECTAL RECTAL DAILY
Status: DISCONTINUED | OUTPATIENT
Start: 2025-04-04 | End: 2025-05-07 | Stop reason: HOSPADM

## 2025-04-04 RX ADMIN — SENNOSIDES 17.2 MG: 8.6 TABLET, FILM COATED ORAL at 21:15

## 2025-04-04 RX ADMIN — BISACODYL 10 MG: 10 SUPPOSITORY RECTAL at 17:28

## 2025-04-04 RX ADMIN — INSULIN LISPRO 1 UNITS: 100 INJECTION, SOLUTION INTRAVENOUS; SUBCUTANEOUS at 17:28

## 2025-04-04 RX ADMIN — PANTOPRAZOLE SODIUM 20 MG: 20 TABLET, DELAYED RELEASE ORAL at 06:00

## 2025-04-04 RX ADMIN — LOSARTAN POTASSIUM 100 MG: 50 TABLET, FILM COATED ORAL at 09:49

## 2025-04-04 RX ADMIN — CYANOCOBALAMIN TAB 500 MCG 1000 MCG: 500 TAB at 09:49

## 2025-04-04 RX ADMIN — HYDROCHLOROTHIAZIDE 25 MG: 25 TABLET ORAL at 09:49

## 2025-04-04 RX ADMIN — ATORVASTATIN CALCIUM 20 MG: 20 TABLET, FILM COATED ORAL at 09:49

## 2025-04-04 RX ADMIN — INSULIN LISPRO 1 UNITS: 100 INJECTION, SOLUTION INTRAVENOUS; SUBCUTANEOUS at 22:00

## 2025-04-04 RX ADMIN — Medication 2000 UNITS: at 09:49

## 2025-04-04 RX ADMIN — ENOXAPARIN SODIUM 40 MG: 40 INJECTION SUBCUTANEOUS at 09:49

## 2025-04-04 RX ADMIN — SODIUM CHLORIDE 75 ML/HR: 0.9 INJECTION, SOLUTION INTRAVENOUS at 13:00

## 2025-04-04 NOTE — PHYSICAL THERAPY NOTE
PHYSICAL THERAPY NOTE          Patient Name: Alexis Dennison  Today's Date: 4/4/2025 04/04/25 1208   PT Last Visit   PT Visit Date 04/04/25   Note Type   Note Type Treatment   Pain Assessment   Pain Assessment Tool FLACC   Pain Score No Pain   Pain Rating: FLACC (Rest) - Face 0   Pain Rating: FLACC (Rest) - Legs 0   Pain Rating: FLACC (Rest) - Activity 0   Pain Rating: FLACC (Rest) - Cry 0   Pain Rating: FLACC (Rest) - Consolability 0   Score: FLACC (Rest) 0   Pain Rating: FLACC (Activity) - Face 0   Pain Rating: FLACC (Activity) - Legs 0   Pain Rating: FLACC (Activity) - Activity 0   Pain Rating: FLACC (Activity) - Cry 0   Pain Rating: FLACC (Activity) - Consolability 0   Score: FLACC (Activity) 0   Restrictions/Precautions   Weight Bearing Precautions Per Order No   Other Precautions Cognitive;Chair Alarm;Bed Alarm;Multiple lines;Telemetry;Fall Risk  (expressive aphasia)   General   Chart Reviewed Yes   Response to Previous Treatment Patient with no complaints from previous session.   Family/Caregiver Present Yes  (sister and nephew)   Cognition   Overall Cognitive Status Impaired   Arousal/Participation Responsive;Cooperative   Attention Attends with cues to redirect   Orientation Level Disoriented to place;Disoriented to time;Disoriented to situation;Oriented to person   Memory Decreased short term memory;Decreased recall of recent events;Decreased recall of precautions;Decreased long term memory;Decreased recall of biographical information   Following Commands Follows one step commands with increased time or repetition   Comments pt pleasant and cooperative. flat affect throughout session   Subjective   Subjective pt agreeable to mobilize   Bed Mobility   Supine to Sit 5  Supervision   Additional items HOB elevated;Increased time required;Verbal cues   Sit to Supine Unable to assess   Additional Comments pt OOB in chair at end  of session   Transfers   Sit to Stand 5  Supervision   Additional items Increased time required;Verbal cues   Stand to Sit 5  Supervision   Additional items Increased time required;Verbal cues;Armrests   Toilet transfer 5  Supervision   Additional items Increased time required;Verbal cues;Standard toilet   Additional Comments no AD. x2 STS   Ambulation/Elevation   Gait pattern Inconsistent maria esther;Short stride;Excessively slow   Gait Assistance 4  Minimal assist  (CGA)   Additional items Assist x 1;Verbal cues   Assistive Device None   Distance 10'+5'+8'+80'+120'+40'   Stair Management Assistance Not tested   Ambulation/Elevation Additional Comments no overt LOB.   Balance   Static Sitting Fair +   Dynamic Sitting Fair   Static Standing Fair -   Dynamic Standing Poor +   Ambulatory Poor +   Endurance Deficit   Endurance Deficit Yes   Endurance Deficit Description pt limited by decreased activity tolerance   Activity Tolerance   Activity Tolerance Other (Comment);Patient tolerated treatment well  (cognition)   Nurse Made Aware yes-RN cleared   Exercises   Neuro re-ed pt ambulating in hallway with CGA to collect cones x6 for 120' with VC and stacking them together   Balance training  pt standing at sink with CS-CGA to wash face, attempted to brush teeth but pt unable to follow commands and cueing   Assessment   Prognosis Fair   Problem List Decreased endurance;Impaired balance;Decreased mobility;Decreased coordination;Decreased cognition;Impaired judgement;Decreased safety awareness   Assessment Pt pleasant and agreeable to participate in PT session. Completes mobility and therapeutic activity as outlined above. Pt requires S for transfers throughout session with CGA for safety during ambulation. Pt able to increase ambulation distance compared to previous session. Pt functionally progressing however limited by cognition requiring constant cueing and redirection to task. Pt unable to brush his teeth with verbal or  visual cueing, attempting to shave instead. Pt will continue to benefit from skilled acute PT services to address deficits and promote mobility. Pt left upright in chair with chair alarm donned, call bell and personal items within reach and all needs met.   Barriers to Discharge Inaccessible home environment;Decreased caregiver support   Goals   Patient Goals to go to the bathroom   STG Expiration Date 04/15/25   PT Treatment Day 1   Plan   Treatment/Interventions Functional transfer training;LE strengthening/ROM;Elevations;Therapeutic exercise;Endurance training;Cognitive reorientation;Bed mobility;Gait training;Continued evaluation;Compensatory technique education;Spoke to nursing   Progress Progressing toward goals   PT Frequency 1-2x/wk   Discharge Recommendation   Rehab Resource Intensity Level, PT II (Moderate Resource Intensity)   AM-PAC Basic Mobility Inpatient   Turning in Flat Bed Without Bedrails 3   Lying on Back to Sitting on Edge of Flat Bed Without Bedrails 3   Moving Bed to Chair 3   Standing Up From Chair Using Arms 3   Walk in Room 3   Climb 3-5 Stairs With Railing 2   Basic Mobility Inpatient Raw Score 17   Basic Mobility Standardized Score 39.67   [facility] Highest Level Of Mobility   -HL Goal 5: Stand one or more mins   -HLM Achieved 7: Walk 25 feet or more   Education   Education Provided Mobility training   Patient Reinforcement needed   End of Consult   Patient Position at End of Consult Bedside chair;All needs within reach;Bed/Chair alarm activated   Ellen Joshi DPT

## 2025-04-04 NOTE — QUICK NOTE
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  LP on 3/31/2025, culture negative, flow cytometry was nonspecific.  We had requested stereotactic biopsy unfortunately due to position of the lesion would be high risk involving basal ganglia that can cause significant morbidities.  Multidisciplinary discussion was held with neurosurgery and primary team, consensus was to repeat LP and follow-up cytology.  I met with patient's sister at bedside, discussed her plan and she is aware that we will need to wait until we have final diagnosis.  Her further questioning was if it is lymphoma or could be proposed treatment, briefly discussed about possibility of MTR that includes methotrexate, rituximab and Temodar but it depends on further studies.   All questions answered.  Oncology to follow once LP results are back.

## 2025-04-04 NOTE — PLAN OF CARE
Problem: SAFETY ADULT  Goal: Patient will remain free of falls  Description: INTERVENTIONS:- Educate patient/family on patient safety including physical limitations- Instruct patient to call for assistance with activity - Consult OT/PT to assist with strengthening/mobility - Keep Call bell within reach- Keep bed low and locked with side rails adjusted as appropriate- Keep care items and personal belongings within reach- Initiate and maintain comfort rounds- Make Fall Risk Sign visible to staff- Offer Toileting every 2 Hours, in advance of need- Initiate/Maintain bed/chair alarm- Obtain necessary fall risk management equipment.- Apply yellow socks and bracelet for high fall risk patients- Consider moving patient to room near nurses station  Outcome: Progressing  Goal: Maintain or return to baseline ADL function  Description: INTERVENTIONS:-  Assess patient's ability to carry out ADLs; assess patient's baseline for ADL function and identify physical deficits which impact ability to perform ADLs (bathing, care of mouth/teeth, toileting, grooming, dressing, etc.)- Assess/evaluate cause of self-care deficits - Assess range of motion- Assess patient's mobility; develop plan if impaired- Assess patient's need for assistive devices and provide as appropriate- Encourage maximum independence but intervene and supervise when necessary- Involve family in performance of ADLs- Assess for home care needs following discharge - Consider OT consult to assist with ADL evaluation and planning for discharge- Provide patient education as appropriate  Outcome: Progressing  Goal: Maintains/Returns to pre admission functional level  Description: INTERVENTIONS:- Perform AM-PAC 6 Click Basic Mobility/ Daily Activity assessment daily.- Set and communicate daily mobility goal to care team and patient/family/caregiver. - Collaborate with rehabilitation services on mobility goals if consulted- Reposition patient every 2 hours.- Dangle patient 3  times a day- Stand patient 3 times a day- Ambulate patient 3 times a day- Out of bed to chair 3 times a day - Out of bed for meals 3 times a day- Out of bed for toileting- Record patient progress and toleration of activity level   Outcome: Progressing

## 2025-04-04 NOTE — ASSESSMENT & PLAN NOTE
Has been well-controlled as outpatient with hemoglobin A1c of 6.6%  Hold home oral medications, start correctional insulin coverage   Accu-Cheks reviewed and acceptable  Continue hypoglycemia protocol and carb controlled diet

## 2025-04-04 NOTE — ASSESSMENT & PLAN NOTE
Continue senna, MiraLAX.  Patient has been refusing MiraLAX  Add suppository  Ambulate patient as able

## 2025-04-04 NOTE — PROGRESS NOTES
"Progress Note - Hospitalist   Name: Alexis Dennison 64 y.o. male I MRN: [id number]  Unit/Bed#: [facility] 705-01 I Date of Admission: 3/29/2025   Date of Service: 4/4/2025 I Hospital Day: 6    Assessment & Plan  Brain tumor (HCC)  Patient with development of worsening confusion, recently seen at the [facility] ED 3/24/2025 with CT head at that time with finding of brain lesion for which MRI was advised to exclude cancerous etiology.     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  Follow lymphoma/leukemia panel  CT head on 4/3 with interval increase in ventricular caliber concerning for worsening hydrocephalus  Discussed with neurosurgery and oncology-recommending high-volume LP.  Discussed with radiology and IR, can likely be performed over the weekend as patient received Lovenox today.  DVT prophylaxis held  No indication for AED per neurosurgery  Continue neurochecks every 4 hours   Encephalopathy  Due to brain mass  Patient with acute onset confusion, forgetting names/places, oriented to self only  Delirium precaution  CT on 4/3 with findings concerning of worsening hydrocephalus  Type 2 diabetes mellitus with hyperglycemia, without long-term current use of insulin (HCC)  Has been well-controlled as outpatient with hemoglobin A1c of 6.6%  Hold home oral medications, start correctional insulin coverage   Accu-Cheks reviewed and acceptable  Continue hypoglycemia protocol and carb controlled diet  Mixed " hyperlipidemia  Chronic and stable, continue PTA statin   HTN, goal below 130/80  Continue losartan and-hydrochlorothiazide  BP reviewed and acceptable  Liver lesion  MRI obtained: No suspicious hepatic lesions.  There is a simple right hepatic lobe cyst.  Mild diffuse hepatic steatosis.  LFTs stable  Outpatient follow up advised  Thyroid nodule  Incidental findings on CT scan  Will need nonemergent thyroid ultrasound for follow-up in the outpatient setting  Pulmonary nodule  Imaging with 4 mm right lower lobe pulmonary nodule.  3-month follow-up CT chest advised  Constipation  Continue senna, MiraLAX.  Patient has been refusing MiraLAX  Add suppository  Ambulate patient as able  Ambulatory dysfunction  PT/OT recommending level 2 at discharge  Awaiting medically stability  Ambulate patient as able  Fall precautions    VTE Pharmacologic Prophylaxis: VTE Score: 5 High Risk (Score >/= 5) - Pharmacological DVT Prophylaxis Contraindicated. Sequential Compression Devices Ordered.    Mobility:   Basic Mobility Inpatient Raw Score: 18  JH-HLM Goal: 6: Walk 10 steps or more  JH-HLM Achieved: 7: Walk 25 feet or more  JH-HLM Goal achieved. Continue to encourage appropriate mobility.    Patient Centered Rounds: I performed bedside rounds with nursing staff today.   Discussions with Specialists or Other Care Team Provider: , neurosurgery, oncology, radiology, interventional radiology    Education and Discussions with Family / Patient: Updated  (sister and nephew) at bedside.    Current Length of Stay: 6 day(s)  Current Patient Status: Inpatient   Certification Statement: The patient will continue to require additional inpatient hospital stay due to as above  Discharge Plan: Anticipate discharge in >72 hrs to rehab facility.    Code Status: Level 1 - Full Code    Subjective   No events overnight.  Patient resting comfortably.  Tolerating oral intake with no nausea or vomiting.  Reports no  complaints    Objective :  Temp:  [98 °F (36.7 °C)-98.7 °F (37.1 °C)] 98 °F (36.7 °C)  HR:  [56-68] 57  BP: (137-145)/() 141/77  Resp:  [16-22] 22  SpO2:  [94 %-98 %] 94 %  O2 Device: None (Room air)    Body mass index is 27.26 kg/m².     Input and Output Summary (last 24 hours):     Intake/Output Summary (Last 24 hours) at 4/4/2025 0831  Last data filed at 4/3/2025 2317  Gross per 24 hour   Intake 1221.25 ml   Output --   Net 1221.25 ml       Physical Exam  Vitals and nursing note reviewed.   Constitutional:       General: He is not in acute distress.     Appearance: He is well-developed.   HENT:      Head: Normocephalic and atraumatic.   Eyes:      Conjunctiva/sclera: Conjunctivae normal.   Cardiovascular:      Rate and Rhythm: Normal rate and regular rhythm.   Pulmonary:      Effort: No respiratory distress.      Breath sounds: Normal breath sounds. No wheezing or rhonchi.   Abdominal:      General: Bowel sounds are normal.      Palpations: Abdomen is soft.      Tenderness: There is no abdominal tenderness.   Musculoskeletal:         General: No swelling.      Cervical back: Neck supple.   Skin:     General: Skin is warm and dry.      Capillary Refill: Capillary refill takes less than 2 seconds.   Neurological:      Mental Status: He is alert. He is disoriented.      Comments: Oriented to person only           Lines/Drains:          Lab Results: I have reviewed the following results:   Results from last 7 days   Lab Units 04/01/25  0535 03/31/25  0446 03/29/25 2019   WBC Thousand/uL 7.15   < > 7.36   HEMOGLOBIN g/dL 14.0   < > 14.6   HEMATOCRIT % 39.2   < > 41.5   PLATELETS Thousands/uL 184   < > 209   SEGS PCT %  --   --  58   LYMPHO PCT %  --   --  32   MONO PCT %  --   --  7   EOS PCT %  --   --  2    < > = values in this interval not displayed.     Results from last 7 days   Lab Units 04/01/25  0535   SODIUM mmol/L 140   POTASSIUM mmol/L 4.0   CHLORIDE mmol/L 107   CO2 mmol/L 26   BUN mg/dL 15    CREATININE mg/dL 1.03   ANION GAP mmol/L 7   CALCIUM mg/dL 8.9   GLUCOSE RANDOM mg/dL 115     Results from last 7 days   Lab Units 03/31/25  0446   INR  1.00     Results from last 7 days   Lab Units 04/04/25  0629 04/03/25  2125 04/03/25  1538 04/03/25  1037 04/03/25  0705 04/02/25  2103 04/02/25  1605 04/02/25  1110 04/02/25  0610 04/01/25  2104 04/01/25  1635 04/01/25  1101   POC GLUCOSE mg/dl 112 97 117 152* 108 131 132 188* 107 149* 164* 211*               Recent Cultures (last 7 days):   Results from last 7 days   Lab Units 03/31/25  1237   GRAM STAIN RESULT  No Polys or Bacteria seen  Rare Mononuclear Cells       Imaging Results Review: I reviewed radiology reports from this admission including: CT head.  Other Study Results Review: No additional pertinent studies reviewed.    Last 24 Hours Medication List:     Current Facility-Administered Medications:     acetaminophen (TYLENOL) tablet 650 mg, Q6H PRN    aluminum-magnesium hydroxide-simethicone (MAALOX) oral suspension 30 mL, Q4H PRN    [Held by provider] aspirin (ECOTRIN LOW STRENGTH) EC tablet 81 mg, Daily    atorvastatin (LIPITOR) tablet 20 mg, Daily    Cholecalciferol (VITAMIN D3) tablet 2,000 Units, Daily    cyanocobalamin (VITAMIN B-12) tablet 1,000 mcg, Daily    enoxaparin (LOVENOX) subcutaneous injection 40 mg, Q24H CAIT    losartan (COZAAR) tablet 100 mg, Daily **AND** hydroCHLOROthiazide tablet 25 mg, Daily    insulin lispro (HumALOG/ADMELOG) 100 units/mL subcutaneous injection 1-5 Units, HS    insulin lispro (HumALOG/ADMELOG) 100 units/mL subcutaneous injection 1-6 Units, TID AC **AND** Fingerstick Glucose (POCT), TID AC    ondansetron (ZOFRAN) injection 4 mg, Q6H PRN    pantoprazole (PROTONIX) EC tablet 20 mg, Early Morning    polyethylene glycol (MIRALAX) packet 17 g, Daily    senna (SENOKOT) tablet 17.2 mg, HS    sodium chloride 0.9 % infusion, Continuous, Last Rate: 75 mL/hr (04/03/25 5353)    Administrative Statements   Today, Patient Was  Seen By: Dionne Huang MD      **Please Note: This note may have been constructed using a voice recognition system.**

## 2025-04-04 NOTE — ASSESSMENT & PLAN NOTE
"Patient with development of worsening confusion, recently seen at the [facility] ED 3/24/2025 with CT head at that time with finding of brain lesion for which MRI was advised to exclude cancerous etiology.     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  Follow lymphoma/leukemia panel  CT head on 4/3 with interval increase in ventricular caliber concerning for worsening hydrocephalus  Discussed with neurosurgery and oncology-recommending high-volume LP.  Discussed with radiology and IR, can likely be performed over the weekend as patient received Lovenox today.  DVT prophylaxis held  No indication for AED per neurosurgery  Continue neurochecks every 4 hours   "

## 2025-04-04 NOTE — ASSESSMENT & PLAN NOTE
Due to brain mass  Patient with acute onset confusion, forgetting names/places, oriented to self only  Delirium precaution  CT on 4/3 with findings concerning of worsening hydrocephalus

## 2025-04-05 LAB
GLUCOSE SERPL-MCNC: 116 MG/DL (ref 65–140)
GLUCOSE SERPL-MCNC: 122 MG/DL (ref 65–140)
GLUCOSE SERPL-MCNC: 128 MG/DL (ref 65–140)
GLUCOSE SERPL-MCNC: 140 MG/DL (ref 65–140)
INR PPP: 1.03 (ref 0.85–1.19)
PLATELET # BLD AUTO: 184 THOUSANDS/UL (ref 149–390)
PMV BLD AUTO: 10.2 FL (ref 8.9–12.7)
PROTHROMBIN TIME: 13.8 SECONDS (ref 12.3–15)

## 2025-04-05 PROCEDURE — 82948 REAGENT STRIP/BLOOD GLUCOSE: CPT

## 2025-04-05 PROCEDURE — 99232 SBSQ HOSP IP/OBS MODERATE 35: CPT | Performed by: STUDENT IN AN ORGANIZED HEALTH CARE EDUCATION/TRAINING PROGRAM

## 2025-04-05 PROCEDURE — 85610 PROTHROMBIN TIME: CPT | Performed by: STUDENT IN AN ORGANIZED HEALTH CARE EDUCATION/TRAINING PROGRAM

## 2025-04-05 PROCEDURE — 85049 AUTOMATED PLATELET COUNT: CPT | Performed by: STUDENT IN AN ORGANIZED HEALTH CARE EDUCATION/TRAINING PROGRAM

## 2025-04-05 RX ADMIN — POLYETHYLENE GLYCOL 3350 17 G: 17 POWDER, FOR SOLUTION ORAL at 08:42

## 2025-04-05 RX ADMIN — SODIUM CHLORIDE 75 ML/HR: 0.9 INJECTION, SOLUTION INTRAVENOUS at 03:10

## 2025-04-05 RX ADMIN — SODIUM CHLORIDE 75 ML/HR: 0.9 INJECTION, SOLUTION INTRAVENOUS at 15:46

## 2025-04-05 RX ADMIN — Medication 2000 UNITS: at 08:42

## 2025-04-05 RX ADMIN — HYDROCHLOROTHIAZIDE 25 MG: 25 TABLET ORAL at 08:42

## 2025-04-05 RX ADMIN — PANTOPRAZOLE SODIUM 20 MG: 20 TABLET, DELAYED RELEASE ORAL at 06:42

## 2025-04-05 RX ADMIN — CYANOCOBALAMIN TAB 500 MCG 1000 MCG: 500 TAB at 08:42

## 2025-04-05 RX ADMIN — LOSARTAN POTASSIUM 100 MG: 50 TABLET, FILM COATED ORAL at 08:42

## 2025-04-05 RX ADMIN — ATORVASTATIN CALCIUM 20 MG: 20 TABLET, FILM COATED ORAL at 08:42

## 2025-04-05 NOTE — ASSESSMENT & PLAN NOTE
Due to brain mass  Patient with acute onset confusion, forgetting names/places, oriented to self only  Delirium precautions  CT on 4/3 with findings concerning of worsening hydrocephalus

## 2025-04-05 NOTE — ASSESSMENT & PLAN NOTE
Continue senna, MiraLAX.  Patient has been refusing MiraLAX  Continue suppository  Ambulate patient as able

## 2025-04-05 NOTE — ASSESSMENT & PLAN NOTE
"Patient with development of worsening confusion, recently seen at the [facility] ED 3/24/2025 with CT head at that time with finding of brain lesion for which MRI was advised to exclude cancerous etiology.     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  Follow lymphoma/leukemia panel  CT head on 4/3 with interval increase in ventricular caliber concerning for worsening hydrocephalus  Discussed with neurosurgery and oncology-recommending high-volume LP  Discussed with IR and plan for intervention over the weekend. Labs ordered  No indication for AED per neurosurgery  Continue neurochecks every 4 hours   "

## 2025-04-05 NOTE — PLAN OF CARE
Problem: PAIN - ADULT  Goal: Verbalizes/displays adequate comfort level or baseline comfort level  Description: Interventions:- Encourage patient to monitor pain and request assistance- Assess pain using appropriate pain scale- Administer analgesics based on type and severity of pain and evaluate response- Implement non-pharmacological measures as appropriate and evaluate response- Notify physician/advanced practitioner if interventions unsuccessful or patient reports new pain  Outcome: Progressing     Problem: INFECTION - ADULT  Goal: Absence or prevention of progression during hospitalization  Description: INTERVENTIONS:- Assess and monitor for signs and symptoms of infection- Monitor lab/diagnostic results- Monitor all insertion sites, i.e. indwelling lines, tubes, and drains- [place] appropriate cooling/warming therapies per order- Administer medications as ordered- Instruct and encourage patient and family to use good hand hygiene technique- Identify and instruct in appropriate isolation precautions for identified infection/condition  Outcome: Progressing     Problem: SAFETY ADULT  Goal: Patient will remain free of falls  Description: INTERVENTIONS:- Educate patient/family on patient safety including physical limitations- Instruct patient to call for assistance with activity - Consult OT/PT to assist with strengthening/mobility - Keep Call bell within reach- Keep bed low and locked with side rails adjusted as appropriate- Keep care items and personal belongings within reach- Initiate and maintain comfort rounds- Make Fall Risk Sign visible to staff- Offer Toileting every 2 Hours, in advance of need- Initiate/Maintain bed/chair alarm- Obtain necessary fall risk management equipment.- Apply yellow socks and bracelet for high fall risk patients- Consider moving patient to room near nurses station  Outcome: Progressing  Goal: Maintain or return to baseline ADL function  Description: INTERVENTIONS:-  Assess  patient's ability to carry out ADLs; assess patient's baseline for ADL function and identify physical deficits which impact ability to perform ADLs (bathing, care of mouth/teeth, toileting, grooming, dressing, etc.)- Assess/evaluate cause of self-care deficits - Assess range of motion- Assess patient's mobility; develop plan if impaired- Assess patient's need for assistive devices and provide as appropriate- Encourage maximum independence but intervene and supervise when necessary- Involve family in performance of ADLs- Assess for home care needs following discharge - Consider OT consult to assist with ADL evaluation and planning for discharge- Provide patient education as appropriate  Outcome: Progressing  Goal: Maintains/Returns to pre admission functional level  Description: INTERVENTIONS:- Perform AM-PAC 6 Click Basic Mobility/ Daily Activity assessment daily.- Set and communicate daily mobility goal to care team and patient/family/caregiver. - Collaborate with rehabilitation services on mobility goals if consulted- Reposition patient every 2 hours.- Dangle patient 3 times a day- Stand patient 3 times a day- Ambulate patient 3 times a day- Out of bed to chair 3 times a day - Out of bed for meals 3 times a day- Out of bed for toileting- Record patient progress and toleration of activity level   Outcome: Progressing     Problem: DISCHARGE PLANNING  Goal: Discharge to home or other facility with appropriate resources  Description: INTERVENTIONS:- Identify barriers to discharge w/patient and caregiver- Arrange for needed discharge resources and transportation as appropriate- Identify discharge learning needs (meds, wound care, etc.)- Refer to Case Management Department for coordinating discharge planning if the patient needs post-hospital services based on physician/advanced practitioner order or complex needs related to functional status, cognitive ability, or social support system  Outcome: Progressing      Problem: Knowledge Deficit  Goal: Patient/family/caregiver demonstrates understanding of disease process, treatment plan, medications, and discharge instructions  Description: Complete learning assessment and assess knowledge base.Interventions:- Provide teaching at level of understanding- Provide teaching via preferred learning methods  Outcome: Progressing     Problem: NEUROSENSORY - ADULT  Goal: Achieves stable or improved neurological status  Description: INTERVENTIONS- Monitor and report changes in neurological status- Monitor vital signs such as temperature, blood pressure, glucose, and any other labs ordered - Initiate measures to prevent increased intracranial pressure- Monitor for seizure activity and implement precautions if appropriate    Outcome: Progressing  Goal: Remains free of injury related to seizures activity  Description: INTERVENTIONS- Maintain airway, patient safety  and administer oxygen as ordered- Monitor patient for seizure activity, document and report duration and description of seizure to physician/advanced practitioner- If seizure occurs,  ensure patient safety during seizure- Reorient patient post seizure- Seizure pads on all 4 side rails- Instruct patient/family to notify RN of any seizure activity including if an aura is experienced- Instruct patient/family to call for assistance with activity based on nursing assessment- Administer anti-seizure medications if ordered  Outcome: Progressing  Goal: Achieves maximal functionality and self care  Description: INTERVENTIONS- Monitor swallowing and airway patency with patient fatigue and changes in neurological status- Encourage and assist patient to increase activity and self care. - Encourage visually impaired, hearing impaired and aphasic patients to use assistive/communication devices  Outcome: Progressing     Problem: METABOLIC, FLUID AND ELECTROLYTES - ADULT  Goal: Glucose maintained within target range  Description: INTERVENTIONS:-  Monitor Blood Glucose as ordered- Assess for signs and symptoms of hyperglycemia and hypoglycemia- Administer ordered medications to maintain glucose within target range- Assess nutritional intake and initiate nutrition service referral as needed  Outcome: Progressing     Problem: Prexisting or High Potential for Compromised Skin Integrity  Goal: Skin integrity is maintained or improved  Description: INTERVENTIONS:- Identify patients at risk for skin breakdown- Assess and monitor skin integrity- Assess and monitor nutrition and hydration status- Monitor labs - Assess for incontinence - Turn and reposition patient- Assist with mobility/ambulation- Relieve pressure over bony prominences- Avoid friction and shearing- Provide appropriate hygiene as needed including keeping skin clean and dry- Evaluate need for skin moisturizer/barrier cream- Collaborate with interdisciplinary team - Patient/family teaching- Consider wound care consult   Outcome: Progressing

## 2025-04-05 NOTE — PROGRESS NOTES
"Progress Note - Hospitalist   Name: Alexis Dennison 64 y.o. male I MRN: [id number]  Unit/Bed#: [facility] 705-01 I Date of Admission: 3/29/2025   Date of Service: 4/5/2025 I Hospital Day: 7    Assessment & Plan  Brain tumor (HCC)  Patient with development of worsening confusion, recently seen at the [facility] ED 3/24/2025 with CT head at that time with finding of brain lesion for which MRI was advised to exclude cancerous etiology.     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  Follow lymphoma/leukemia panel  CT head on 4/3 with interval increase in ventricular caliber concerning for worsening hydrocephalus  Discussed with neurosurgery and oncology-recommending high-volume LP  Discussed with IR and plan for intervention over the weekend. Labs ordered  No indication for AED per neurosurgery  Continue neurochecks every 4 hours   Encephalopathy  Due to brain mass  Patient with acute onset confusion, forgetting names/places, oriented to self only  Delirium precautions  CT on 4/3 with findings concerning of worsening hydrocephalus  Type 2 diabetes mellitus with hyperglycemia, without long-term current use of insulin (HCC)  Has been well-controlled as outpatient with hemoglobin A1c of 6.6%  Hold home oral medications, start correctional insulin coverage   Accu-Cheks reviewed and acceptable  Continue hypoglycemia protocol and carb controlled diet  Mixed hyperlipidemia  Chronic and stable, continue PTA statin "   HTN, goal below 130/80  Continue losartan and hydrochlorothiazide  BP reviewed and acceptable  Liver lesion  MRI obtained: No suspicious hepatic lesions.  There is a simple right hepatic lobe cyst.  Mild diffuse hepatic steatosis.  LFTs stable  Outpatient follow up advised  Thyroid nodule  Incidental findings on CT scan  Will need nonemergent thyroid ultrasound for follow-up in the outpatient setting  Pulmonary nodule  Imaging with 4 mm right lower lobe pulmonary nodule.  CT chest 3-month follow-up  Constipation  Continue senna, MiraLAX.  Patient has been refusing MiraLAX  Continue suppository  Ambulate patient as able  Ambulatory dysfunction  PT/OT recommending level 2 at discharge  Awaiting medically stability  Ambulate patient as able  Fall precautions    VTE Pharmacologic Prophylaxis: VTE Score: 5 High Risk (Score >/= 5) - Pharmacological DVT Prophylaxis Contraindicated. Sequential Compression Devices Ordered.    Mobility:   Basic Mobility Inpatient Raw Score: 17  JH-HLM Goal: 5: Stand one or more mins  JH-HLM Achieved: 7: Walk 25 feet or more  JH-HLM Goal achieved. Continue to encourage appropriate mobility.    Patient Centered Rounds: I performed bedside rounds with nursing staff today.   Discussions with Specialists or Other Care Team Provider:     Education and Discussions with Family / Patient: Updated  (daughter and son in law) via phone.    Current Length of Stay: 7 day(s)  Current Patient Status: Inpatient   Certification Statement: The patient will continue to require additional inpatient hospital stay due to as above  Discharge Plan: Anticipate discharge in >72 hrs to rehab facility.    Code Status: Level 1 - Full Code    Subjective   No events overnight. Patient has no complaints. No headache or lightheadedness.     Objective :  Temp:  [98 °F (36.7 °C)-98.4 °F (36.9 °C)] 98.4 °F (36.9 °C)  HR:  [56-59] 56  BP: (132-149)/(70-79) 135/75  Resp:  [15] 15  SpO2:  [82 %-98 %] 98  %    Body mass index is 27.26 kg/m².     Input and Output Summary (last 24 hours):     Intake/Output Summary (Last 24 hours) at 4/5/2025 0845  Last data filed at 4/5/2025 0310  Gross per 24 hour   Intake 2182.5 ml   Output --   Net 2182.5 ml       Physical Exam  Vitals and nursing note reviewed.   Constitutional:       General: He is not in acute distress.     Appearance: He is well-developed.   HENT:      Head: Normocephalic and atraumatic.   Eyes:      Conjunctiva/sclera: Conjunctivae normal.   Cardiovascular:      Rate and Rhythm: Normal rate and regular rhythm.   Pulmonary:      Effort: No respiratory distress.      Breath sounds: Normal breath sounds. No wheezing or rhonchi.   Abdominal:      Palpations: Abdomen is soft.      Tenderness: There is no abdominal tenderness.   Musculoskeletal:         General: No swelling.      Cervical back: Neck supple.   Skin:     General: Skin is warm and dry.      Capillary Refill: Capillary refill takes less than 2 seconds.   Neurological:      Mental Status: He is alert.         Lines/Drains:        Lab Results: I have reviewed the following results:   Results from last 7 days   Lab Units 04/05/25  0429 04/04/25  0908   WBC Thousand/uL  --  7.27   HEMOGLOBIN g/dL  --  13.7   HEMATOCRIT %  --  39.6   PLATELETS Thousands/uL 184 180   SEGS PCT %  --  64   LYMPHO PCT %  --  27   MONO PCT %  --  7   EOS PCT %  --  2     Results from last 7 days   Lab Units 04/04/25  0908   SODIUM mmol/L 141   POTASSIUM mmol/L 3.6   CHLORIDE mmol/L 107   CO2 mmol/L 27   BUN mg/dL 14   CREATININE mg/dL 1.02   ANION GAP mmol/L 7   CALCIUM mg/dL 8.5   ALBUMIN g/dL 3.5   TOTAL BILIRUBIN mg/dL 0.90   ALK PHOS U/L 41   ALT U/L 22   AST U/L 11*   GLUCOSE RANDOM mg/dL 135     Results from last 7 days   Lab Units 04/05/25  0429   INR  1.03     Results from last 7 days   Lab Units 04/05/25  0638 04/04/25  2144 04/04/25  1610 04/04/25  1114 04/04/25  0629 04/03/25  2125 04/03/25  1538 04/03/25  1037  04/03/25  0705 04/02/25  2103 04/02/25  1605 04/02/25  1110   POC GLUCOSE mg/dl 116 202* 188* 128 112 97 117 152* 108 131 132 188*               Recent Cultures (last 7 days):   Results from last 7 days   Lab Units 03/31/25  1237   GRAM STAIN RESULT  No Polys or Bacteria seen  Rare Mononuclear Cells       Imaging Results Review: No pertinent imaging studies reviewed.  Other Study Results Review: No additional pertinent studies reviewed.    Last 24 Hours Medication List:     Current Facility-Administered Medications:     acetaminophen (TYLENOL) tablet 650 mg, Q6H PRN    aluminum-magnesium hydroxide-simethicone (MAALOX) oral suspension 30 mL, Q4H PRN    [Held by provider] aspirin (ECOTRIN LOW STRENGTH) EC tablet 81 mg, Daily    atorvastatin (LIPITOR) tablet 20 mg, Daily    bisacodyl (DULCOLAX) rectal suppository 10 mg, Daily    Cholecalciferol (VITAMIN D3) tablet 2,000 Units, Daily    cyanocobalamin (VITAMIN B-12) tablet 1,000 mcg, Daily    [Held by provider] enoxaparin (LOVENOX) subcutaneous injection 40 mg, Q24H CAIT    losartan (COZAAR) tablet 100 mg, Daily **AND** hydroCHLOROthiazide tablet 25 mg, Daily    insulin lispro (HumALOG/ADMELOG) 100 units/mL subcutaneous injection 1-5 Units, HS    insulin lispro (HumALOG/ADMELOG) 100 units/mL subcutaneous injection 1-6 Units, TID AC **AND** Fingerstick Glucose (POCT), TID AC    ondansetron (ZOFRAN) injection 4 mg, Q6H PRN    pantoprazole (PROTONIX) EC tablet 20 mg, Early Morning    polyethylene glycol (MIRALAX) packet 17 g, Daily    senna (SENOKOT) tablet 17.2 mg, HS    sodium chloride 0.9 % infusion, Continuous, Last Rate: 75 mL/hr (04/05/25 0310)    Administrative Statements   Today, Patient Was Seen By: Dionne Huang MD      **Please Note: This note may have been constructed using a voice recognition system.**

## 2025-04-06 LAB
ANION GAP SERPL CALCULATED.3IONS-SCNC: 7 MMOL/L (ref 4–13)
BASOPHILS # BLD AUTO: 0.04 THOUSANDS/ÂΜL (ref 0–0.1)
BASOPHILS NFR BLD AUTO: 1 % (ref 0–1)
BUN SERPL-MCNC: 15 MG/DL (ref 5–25)
CALCIUM SERPL-MCNC: 9.4 MG/DL (ref 8.4–10.2)
CHLORIDE SERPL-SCNC: 105 MMOL/L (ref 96–108)
CO2 SERPL-SCNC: 29 MMOL/L (ref 21–32)
CREAT SERPL-MCNC: 1 MG/DL (ref 0.6–1.3)
EOSINOPHIL # BLD AUTO: 0.12 THOUSAND/ÂΜL (ref 0–0.61)
EOSINOPHIL NFR BLD AUTO: 2 % (ref 0–6)
ERYTHROCYTE [DISTWIDTH] IN BLOOD BY AUTOMATED COUNT: 11.7 % (ref 11.6–15.1)
GFR SERPL CREATININE-BSD FRML MDRD: 78 ML/MIN/1.73SQ M
GLUCOSE SERPL-MCNC: 105 MG/DL (ref 65–140)
GLUCOSE SERPL-MCNC: 111 MG/DL (ref 65–140)
GLUCOSE SERPL-MCNC: 151 MG/DL (ref 65–140)
GLUCOSE SERPL-MCNC: 162 MG/DL (ref 65–140)
GLUCOSE SERPL-MCNC: 163 MG/DL (ref 65–140)
HCT VFR BLD AUTO: 41.5 % (ref 36.5–49.3)
HGB BLD-MCNC: 14.7 G/DL (ref 12–17)
IMM GRANULOCYTES # BLD AUTO: 0.01 THOUSAND/UL (ref 0–0.2)
IMM GRANULOCYTES NFR BLD AUTO: 0 % (ref 0–2)
LYMPHOCYTES # BLD AUTO: 2.37 THOUSANDS/ÂΜL (ref 0.6–4.47)
LYMPHOCYTES NFR BLD AUTO: 35 % (ref 14–44)
MCH RBC QN AUTO: 31.5 PG (ref 26.8–34.3)
MCHC RBC AUTO-ENTMCNC: 35.4 G/DL (ref 31.4–37.4)
MCV RBC AUTO: 89 FL (ref 82–98)
MONOCYTES # BLD AUTO: 0.42 THOUSAND/ÂΜL (ref 0.17–1.22)
MONOCYTES NFR BLD AUTO: 6 % (ref 4–12)
NEUTROPHILS # BLD AUTO: 3.82 THOUSANDS/ÂΜL (ref 1.85–7.62)
NEUTS SEG NFR BLD AUTO: 56 % (ref 43–75)
NRBC BLD AUTO-RTO: 0 /100 WBCS
PLATELET # BLD AUTO: 196 THOUSANDS/UL (ref 149–390)
PMV BLD AUTO: 10.1 FL (ref 8.9–12.7)
POTASSIUM SERPL-SCNC: 4.1 MMOL/L (ref 3.5–5.3)
RBC # BLD AUTO: 4.67 MILLION/UL (ref 3.88–5.62)
SODIUM SERPL-SCNC: 141 MMOL/L (ref 135–147)
WBC # BLD AUTO: 6.78 THOUSAND/UL (ref 4.31–10.16)

## 2025-04-06 PROCEDURE — 82948 REAGENT STRIP/BLOOD GLUCOSE: CPT

## 2025-04-06 PROCEDURE — 80048 BASIC METABOLIC PNL TOTAL CA: CPT | Performed by: STUDENT IN AN ORGANIZED HEALTH CARE EDUCATION/TRAINING PROGRAM

## 2025-04-06 PROCEDURE — 85025 COMPLETE CBC W/AUTO DIFF WBC: CPT | Performed by: STUDENT IN AN ORGANIZED HEALTH CARE EDUCATION/TRAINING PROGRAM

## 2025-04-06 PROCEDURE — 99232 SBSQ HOSP IP/OBS MODERATE 35: CPT | Performed by: STUDENT IN AN ORGANIZED HEALTH CARE EDUCATION/TRAINING PROGRAM

## 2025-04-06 RX ADMIN — Medication 2000 UNITS: at 09:04

## 2025-04-06 RX ADMIN — SODIUM CHLORIDE 75 ML/HR: 0.9 INJECTION, SOLUTION INTRAVENOUS at 05:08

## 2025-04-06 RX ADMIN — POLYETHYLENE GLYCOL 3350 17 G: 17 POWDER, FOR SOLUTION ORAL at 09:03

## 2025-04-06 RX ADMIN — SENNOSIDES 17.2 MG: 8.6 TABLET, FILM COATED ORAL at 22:13

## 2025-04-06 RX ADMIN — INSULIN LISPRO 1 UNITS: 100 INJECTION, SOLUTION INTRAVENOUS; SUBCUTANEOUS at 22:13

## 2025-04-06 RX ADMIN — BISACODYL 10 MG: 10 SUPPOSITORY RECTAL at 09:47

## 2025-04-06 RX ADMIN — INSULIN LISPRO 1 UNITS: 100 INJECTION, SOLUTION INTRAVENOUS; SUBCUTANEOUS at 10:59

## 2025-04-06 RX ADMIN — HYDROCHLOROTHIAZIDE 25 MG: 25 TABLET ORAL at 09:04

## 2025-04-06 RX ADMIN — PANTOPRAZOLE SODIUM 20 MG: 20 TABLET, DELAYED RELEASE ORAL at 05:21

## 2025-04-06 RX ADMIN — ATORVASTATIN CALCIUM 20 MG: 20 TABLET, FILM COATED ORAL at 09:04

## 2025-04-06 RX ADMIN — LOSARTAN POTASSIUM 100 MG: 50 TABLET, FILM COATED ORAL at 09:03

## 2025-04-06 RX ADMIN — CYANOCOBALAMIN TAB 500 MCG 1000 MCG: 500 TAB at 09:04

## 2025-04-06 RX ADMIN — INSULIN LISPRO 1 UNITS: 100 INJECTION, SOLUTION INTRAVENOUS; SUBCUTANEOUS at 16:03

## 2025-04-06 RX ADMIN — SODIUM CHLORIDE 75 ML/HR: 0.9 INJECTION, SOLUTION INTRAVENOUS at 18:48

## 2025-04-06 NOTE — ASSESSMENT & PLAN NOTE
"Patient with development of worsening confusion, recently seen at the [facility] ED 3/24/2025 with CT head at that time with finding of brain lesion for which MRI was advised to exclude cancerous etiology.     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  Lymphoma/leukemia panel nondiagnostic  CT head on 4/3 with interval increase in ventricular caliber concerning for worsening hydrocephalus  Neurosurgery and oncology recommending repeat high-volume LP as previous was nondiagnostic  Discussed with IR and tentative plan for intervention on 4/7  No indication for AED per neurosurgery  Continue neurochecks   "

## 2025-04-06 NOTE — ASSESSMENT & PLAN NOTE
Incidental findings on CT scan  Nonemergent thyroid ultrasound for follow-up in the outpatient setting

## 2025-04-06 NOTE — PROGRESS NOTES
"Progress Note - Hospitalist   Name: Alexis Dennison 64 y.o. male I MRN: [id number]  Unit/Bed#: [facility] 705-01 I Date of Admission: 3/29/2025   Date of Service: 4/6/2025 I Hospital Day: 8    Assessment & Plan  Brain tumor (HCC)  Patient with development of worsening confusion, recently seen at the [facility] ED 3/24/2025 with CT head at that time with finding of brain lesion for which MRI was advised to exclude cancerous etiology.     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  Lymphoma/leukemia panel nondiagnostic  CT head on 4/3 with interval increase in ventricular caliber concerning for worsening hydrocephalus  Neurosurgery and oncology recommending repeat high-volume LP as previous was nondiagnostic  Discussed with IR and tentative plan for intervention on 4/7  No indication for AED per neurosurgery  Continue neurochecks   Encephalopathy  Due to brain mass as above  Patient with acute onset confusion, forgetting names/places, oriented to self only most of the times  Delirium precautions  CT on 4/3 with findings concerning of worsening hydrocephalus  Type 2 diabetes mellitus with hyperglycemia, without long-term current use of insulin (HCC)  Has been well-controlled as outpatient with hemoglobin A1c of 6.6%  Hold home oral medications, start correctional insulin coverage   Accu-Cheks reviewed and acceptable  Continue hypoglycemia protocol and carb controlled diet  Mixed hyperlipidemia  Continue " statin  HTN, goal below 130/80  Continue losartan and hydrochlorothiazide  BP reviewed and acceptable  Liver lesion  MRI obtained: No suspicious hepatic lesions.  There is a simple right hepatic lobe cyst.  Mild diffuse hepatic steatosis.  LFTs stable  Outpatient follow up advised  Thyroid nodule  Incidental findings on CT scan  Nonemergent thyroid ultrasound for follow-up in the outpatient setting  Pulmonary nodule  Imaging with 4 mm right lower lobe pulmonary nodule.  CT chest 3-month follow-up  Constipation  Continue senna, MiraLAX and suppository  Ambulate patient as able  Ambulatory dysfunction  PT/OT recommending level 2 at discharge  Awaiting medically stability  Ambulate patient as able  Fall precautions    VTE Pharmacologic Prophylaxis: VTE Score: 5 High Risk (Score >/= 5) - Pharmacological DVT Prophylaxis Ordered: enoxaparin (Lovenox). Sequential Compression Devices Ordered.    Mobility:   Basic Mobility Inpatient Raw Score: 21  JH-HLM Goal: 6: Walk 10 steps or more  JH-HLM Achieved: 7: Walk 25 feet or more  JH-HLM Goal achieved. Continue to encourage appropriate mobility.    Patient Centered Rounds: I performed bedside rounds with nursing staff today.   Discussions with Specialists or Other Care Team Provider: IR    Education and Discussions with Family / Patient: Updated  (daughter) via phone.    Current Length of Stay: 8 day(s)  Current Patient Status: Inpatient   Certification Statement: The patient will continue to require additional inpatient hospital stay due to as above  Discharge Plan: Anticipate discharge in 48-72 hrs to rehab facility.    Code Status: Level 1 - Full Code    Subjective   No events overnight.  Patient has no complaint this morning.  Tolerating oriented    Objective :  Temp:  [97.4 °F (36.3 °C)-98.8 °F (37.1 °C)] 97.8 °F (36.6 °C)  HR:  [51-62] 61  BP: (121-146)/(54-76) 138/76  Resp:  [16-18] 18  SpO2:  [95 %-98 %] 96 %  O2 Device: None (Room air)    Body mass index  is 27.26 kg/m².     Input and Output Summary (last 24 hours):     Intake/Output Summary (Last 24 hours) at 4/6/2025 0831  Last data filed at 4/6/2025 0801  Gross per 24 hour   Intake 1363.75 ml   Output 525 ml   Net 838.75 ml       Physical Exam  Vitals and nursing note reviewed.   Constitutional:       General: He is not in acute distress.     Appearance: He is well-developed.   HENT:      Head: Normocephalic and atraumatic.   Eyes:      Conjunctiva/sclera: Conjunctivae normal.   Cardiovascular:      Rate and Rhythm: Normal rate and regular rhythm.   Pulmonary:      Effort: No respiratory distress.      Breath sounds: Normal breath sounds.   Musculoskeletal:         General: No swelling.      Cervical back: Neck supple.   Skin:     General: Skin is warm and dry.      Capillary Refill: Capillary refill takes less than 2 seconds.   Neurological:      Mental Status: He is alert. He is disoriented.           Lines/Drains:              Lab Results: I have reviewed the following results:   Results from last 7 days   Lab Units 04/06/25  0501   WBC Thousand/uL 6.78   HEMOGLOBIN g/dL 14.7   HEMATOCRIT % 41.5   PLATELETS Thousands/uL 196   SEGS PCT % 56   LYMPHO PCT % 35   MONO PCT % 6   EOS PCT % 2     Results from last 7 days   Lab Units 04/06/25  0501 04/04/25  0908   SODIUM mmol/L 141 141   POTASSIUM mmol/L 4.1 3.6   CHLORIDE mmol/L 105 107   CO2 mmol/L 29 27   BUN mg/dL 15 14   CREATININE mg/dL 1.00 1.02   ANION GAP mmol/L 7 7   CALCIUM mg/dL 9.4 8.5   ALBUMIN g/dL  --  3.5   TOTAL BILIRUBIN mg/dL  --  0.90   ALK PHOS U/L  --  41   ALT U/L  --  22   AST U/L  --  11*   GLUCOSE RANDOM mg/dL 111 135     Results from last 7 days   Lab Units 04/05/25  0429   INR  1.03     Results from last 7 days   Lab Units 04/06/25  0610 04/05/25  2119 04/05/25  1559 04/05/25  1105 04/05/25  0638 04/04/25  2144 04/04/25  1610 04/04/25  1114 04/04/25  0629 04/03/25  2125 04/03/25  1538 04/03/25  1037   POC GLUCOSE mg/dl 105 122 128 140 116  202* 188* 128 112 97 117 152*               Recent Cultures (last 7 days):   Results from last 7 days   Lab Units 03/31/25  1237   GRAM STAIN RESULT  No Polys or Bacteria seen  Rare Mononuclear Cells       Imaging Results Review: No pertinent imaging studies reviewed.  Other Study Results Review: No additional pertinent studies reviewed.    Last 24 Hours Medication List:     Current Facility-Administered Medications:     acetaminophen (TYLENOL) tablet 650 mg, Q6H PRN    aluminum-magnesium hydroxide-simethicone (MAALOX) oral suspension 30 mL, Q4H PRN    [Held by provider] aspirin (ECOTRIN LOW STRENGTH) EC tablet 81 mg, Daily    atorvastatin (LIPITOR) tablet 20 mg, Daily    bisacodyl (DULCOLAX) rectal suppository 10 mg, Daily    Cholecalciferol (VITAMIN D3) tablet 2,000 Units, Daily    cyanocobalamin (VITAMIN B-12) tablet 1,000 mcg, Daily    [Held by provider] enoxaparin (LOVENOX) subcutaneous injection 40 mg, Q24H CAIT    losartan (COZAAR) tablet 100 mg, Daily **AND** hydroCHLOROthiazide tablet 25 mg, Daily    insulin lispro (HumALOG/ADMELOG) 100 units/mL subcutaneous injection 1-5 Units, HS    insulin lispro (HumALOG/ADMELOG) 100 units/mL subcutaneous injection 1-6 Units, TID AC **AND** Fingerstick Glucose (POCT), TID AC    ondansetron (ZOFRAN) injection 4 mg, Q6H PRN    pantoprazole (PROTONIX) EC tablet 20 mg, Early Morning    polyethylene glycol (MIRALAX) packet 17 g, Daily    senna (SENOKOT) tablet 17.2 mg, HS    sodium chloride 0.9 % infusion, Continuous, Last Rate: 75 mL/hr (04/06/25 6400)    Administrative Statements   Today, Patient Was Seen By: Dionne Huang MD      **Please Note: This note may have been constructed using a voice recognition system.**

## 2025-04-06 NOTE — ASSESSMENT & PLAN NOTE
Due to brain mass as above  Patient with acute onset confusion, forgetting names/places, oriented to self only most of the times  Delirium precautions  CT on 4/3 with findings concerning of worsening hydrocephalus

## 2025-04-06 NOTE — PLAN OF CARE
Problem: PAIN - ADULT  Goal: Verbalizes/displays adequate comfort level or baseline comfort level  Description: Interventions:- Encourage patient to monitor pain and request assistance- Assess pain using appropriate pain scale- Administer analgesics based on type and severity of pain and evaluate response- Implement non-pharmacological measures as appropriate and evaluate response- Notify physician/advanced practitioner if interventions unsuccessful or patient reports new pain  Outcome: Progressing     Problem: INFECTION - ADULT  Goal: Absence or prevention of progression during hospitalization  Description: INTERVENTIONS:- Assess and monitor for signs and symptoms of infection- Monitor lab/diagnostic results- Monitor all insertion sites, i.e. indwelling lines, tubes, and drains- [place] appropriate cooling/warming therapies per order- Administer medications as ordered- Instruct and encourage patient and family to use good hand hygiene technique- Identify and instruct in appropriate isolation precautions for identified infection/condition  Outcome: Progressing     Problem: SAFETY ADULT  Goal: Patient will remain free of falls  Description: INTERVENTIONS:- Educate patient/family on patient safety including physical limitations- Instruct patient to call for assistance with activity - Consult OT/PT to assist with strengthening/mobility - Keep Call bell within reach- Keep bed low and locked with side rails adjusted as appropriate- Keep care items and personal belongings within reach- Initiate and maintain comfort rounds- Make Fall Risk Sign visible to staff- Offer Toileting every 2 Hours, in advance of need- Initiate/Maintain bed/chair alarm- Obtain necessary fall risk management equipment.- Apply yellow socks and bracelet for high fall risk patients- Consider moving patient to room near nurses station  Outcome: Progressing  Goal: Maintain or return to baseline ADL function  Description: INTERVENTIONS:-  Assess  patient's ability to carry out ADLs; assess patient's baseline for ADL function and identify physical deficits which impact ability to perform ADLs (bathing, care of mouth/teeth, toileting, grooming, dressing, etc.)- Assess/evaluate cause of self-care deficits - Assess range of motion- Assess patient's mobility; develop plan if impaired- Assess patient's need for assistive devices and provide as appropriate- Encourage maximum independence but intervene and supervise when necessary- Involve family in performance of ADLs- Assess for home care needs following discharge - Consider OT consult to assist with ADL evaluation and planning for discharge- Provide patient education as appropriate  Outcome: Progressing  Goal: Maintains/Returns to pre admission functional level  Description: INTERVENTIONS:- Perform AM-PAC 6 Click Basic Mobility/ Daily Activity assessment daily.- Set and communicate daily mobility goal to care team and patient/family/caregiver. - Collaborate with rehabilitation services on mobility goals if consulted- Reposition patient every 2 hours.- Dangle patient 3 times a day- Stand patient 3 times a day- Ambulate patient 3 times a day- Out of bed to chair 3 times a day - Out of bed for meals 3 times a day- Out of bed for toileting- Record patient progress and toleration of activity level   Outcome: Progressing     Problem: DISCHARGE PLANNING  Goal: Discharge to home or other facility with appropriate resources  Description: INTERVENTIONS:- Identify barriers to discharge w/patient and caregiver- Arrange for needed discharge resources and transportation as appropriate- Identify discharge learning needs (meds, wound care, etc.)- Refer to Case Management Department for coordinating discharge planning if the patient needs post-hospital services based on physician/advanced practitioner order or complex needs related to functional status, cognitive ability, or social support system  Outcome: Progressing      Problem: Knowledge Deficit  Goal: Patient/family/caregiver demonstrates understanding of disease process, treatment plan, medications, and discharge instructions  Description: Complete learning assessment and assess knowledge base.Interventions:- Provide teaching at level of understanding- Provide teaching via preferred learning methods  Outcome: Progressing     Problem: NEUROSENSORY - ADULT  Goal: Achieves stable or improved neurological status  Description: INTERVENTIONS- Monitor and report changes in neurological status- Monitor vital signs such as temperature, blood pressure, glucose, and any other labs ordered - Initiate measures to prevent increased intracranial pressure- Monitor for seizure activity and implement precautions if appropriate    Outcome: Progressing  Goal: Remains free of injury related to seizures activity  Description: INTERVENTIONS- Maintain airway, patient safety  and administer oxygen as ordered- Monitor patient for seizure activity, document and report duration and description of seizure to physician/advanced practitioner- If seizure occurs,  ensure patient safety during seizure- Reorient patient post seizure- Seizure pads on all 4 side rails- Instruct patient/family to notify RN of any seizure activity including if an aura is experienced- Instruct patient/family to call for assistance with activity based on nursing assessment- Administer anti-seizure medications if ordered  Outcome: Progressing  Goal: Achieves maximal functionality and self care  Description: INTERVENTIONS- Monitor swallowing and airway patency with patient fatigue and changes in neurological status- Encourage and assist patient to increase activity and self care. - Encourage visually impaired, hearing impaired and aphasic patients to use assistive/communication devices  Outcome: Progressing     Problem: METABOLIC, FLUID AND ELECTROLYTES - ADULT  Goal: Glucose maintained within target range  Description: INTERVENTIONS:-  Monitor Blood Glucose as ordered- Assess for signs and symptoms of hyperglycemia and hypoglycemia- Administer ordered medications to maintain glucose within target range- Assess nutritional intake and initiate nutrition service referral as needed  Outcome: Progressing     Problem: Prexisting or High Potential for Compromised Skin Integrity  Goal: Skin integrity is maintained or improved  Description: INTERVENTIONS:- Identify patients at risk for skin breakdown- Assess and monitor skin integrity- Assess and monitor nutrition and hydration status- Monitor labs - Assess for incontinence - Turn and reposition patient- Assist with mobility/ambulation- Relieve pressure over bony prominences- Avoid friction and shearing- Provide appropriate hygiene as needed including keeping skin clean and dry- Evaluate need for skin moisturizer/barrier cream- Collaborate with interdisciplinary team - Patient/family teaching- Consider wound care consult   Outcome: Progressing

## 2025-04-07 ENCOUNTER — APPOINTMENT (INPATIENT)
Dept: RADIOLOGY | Facility: HOSPITAL | Age: 65
DRG: 820 | End: 2025-04-07
Payer: COMMERCIAL

## 2025-04-07 LAB
APPEARANCE CSF: CLEAR
APTT PPP: 27 SECONDS (ref 23–34)
ERYTHROCYTE [DISTWIDTH] IN BLOOD BY AUTOMATED COUNT: 11.8 % (ref 11.6–15.1)
GLUCOSE CSF-MCNC: 47 MG/DL (ref 40–70)
GLUCOSE SERPL-MCNC: 107 MG/DL (ref 65–140)
GLUCOSE SERPL-MCNC: 153 MG/DL (ref 65–140)
GLUCOSE SERPL-MCNC: 190 MG/DL (ref 65–140)
GLUCOSE SERPL-MCNC: 225 MG/DL (ref 65–140)
GRAM STN SPEC: NORMAL
HCT VFR BLD AUTO: 43.8 % (ref 36.5–49.3)
HGB BLD-MCNC: 15.3 G/DL (ref 12–17)
INR PPP: 0.95 (ref 0.85–1.19)
LYMPHOCYTES NFR CSF MANUAL: 85 %
MCH RBC QN AUTO: 31.4 PG (ref 26.8–34.3)
MCHC RBC AUTO-ENTMCNC: 34.9 G/DL (ref 31.4–37.4)
MCV RBC AUTO: 90 FL (ref 82–98)
MONOS+MACROS CSF MANUAL: 15 %
PLATELET # BLD AUTO: 206 THOUSANDS/UL (ref 149–390)
PMV BLD AUTO: 10.3 FL (ref 8.9–12.7)
PROT CSF-MCNC: 414 MG/DL (ref 15–45)
PROTHROMBIN TIME: 12.9 SECONDS (ref 12.3–15)
RBC # BLD AUTO: 4.87 MILLION/UL (ref 3.88–5.62)
RBC # CSF MANUAL: 74 UL (ref 0–10)
TOTAL CELLS COUNTED BLD: YES
TOTAL CELLS COUNTED SPEC: 100
TUBE # CSF: 4
WBC # BLD AUTO: 7.17 THOUSAND/UL (ref 4.31–10.16)
WBC # CSF AUTO: 106 /UL (ref 0–5)

## 2025-04-07 PROCEDURE — 89050 BODY FLUID CELL COUNT: CPT | Performed by: STUDENT IN AN ORGANIZED HEALTH CARE EDUCATION/TRAINING PROGRAM

## 2025-04-07 PROCEDURE — 85730 THROMBOPLASTIN TIME PARTIAL: CPT | Performed by: PHYSICIAN ASSISTANT

## 2025-04-07 PROCEDURE — 88184 FLOWCYTOMETRY/ TC 1 MARKER: CPT | Performed by: STUDENT IN AN ORGANIZED HEALTH CARE EDUCATION/TRAINING PROGRAM

## 2025-04-07 PROCEDURE — 84157 ASSAY OF PROTEIN OTHER: CPT | Performed by: STUDENT IN AN ORGANIZED HEALTH CARE EDUCATION/TRAINING PROGRAM

## 2025-04-07 PROCEDURE — 009U3ZX DRAINAGE OF SPINAL CANAL, PERCUTANEOUS APPROACH, DIAGNOSTIC: ICD-10-PCS | Performed by: RADIOLOGY

## 2025-04-07 PROCEDURE — 89051 BODY FLUID CELL COUNT: CPT | Performed by: STUDENT IN AN ORGANIZED HEALTH CARE EDUCATION/TRAINING PROGRAM

## 2025-04-07 PROCEDURE — 62328 DX LMBR SPI PNXR W/FLUOR/CT: CPT

## 2025-04-07 PROCEDURE — 88185 FLOWCYTOMETRY/TC ADD-ON: CPT | Performed by: STUDENT IN AN ORGANIZED HEALTH CARE EDUCATION/TRAINING PROGRAM

## 2025-04-07 PROCEDURE — 99233 SBSQ HOSP IP/OBS HIGH 50: CPT | Performed by: STUDENT IN AN ORGANIZED HEALTH CARE EDUCATION/TRAINING PROGRAM

## 2025-04-07 PROCEDURE — 85027 COMPLETE CBC AUTOMATED: CPT | Performed by: STUDENT IN AN ORGANIZED HEALTH CARE EDUCATION/TRAINING PROGRAM

## 2025-04-07 PROCEDURE — 82948 REAGENT STRIP/BLOOD GLUCOSE: CPT

## 2025-04-07 PROCEDURE — 88108 CYTOPATH CONCENTRATE TECH: CPT | Performed by: PATHOLOGY

## 2025-04-07 PROCEDURE — 87070 CULTURE OTHR SPECIMN AEROBIC: CPT | Performed by: STUDENT IN AN ORGANIZED HEALTH CARE EDUCATION/TRAINING PROGRAM

## 2025-04-07 PROCEDURE — 82945 GLUCOSE OTHER FLUID: CPT | Performed by: STUDENT IN AN ORGANIZED HEALTH CARE EDUCATION/TRAINING PROGRAM

## 2025-04-07 PROCEDURE — 92523 SPEECH SOUND LANG COMPREHEN: CPT

## 2025-04-07 PROCEDURE — 85610 PROTHROMBIN TIME: CPT | Performed by: PHYSICIAN ASSISTANT

## 2025-04-07 RX ORDER — LIDOCAINE HYDROCHLORIDE 10 MG/ML
5 INJECTION, SOLUTION EPIDURAL; INFILTRATION; INTRACAUDAL; PERINEURAL
Status: COMPLETED | OUTPATIENT
Start: 2025-04-07 | End: 2025-04-07

## 2025-04-07 RX ADMIN — PANTOPRAZOLE SODIUM 20 MG: 20 TABLET, DELAYED RELEASE ORAL at 05:54

## 2025-04-07 RX ADMIN — LOSARTAN POTASSIUM 100 MG: 50 TABLET, FILM COATED ORAL at 08:10

## 2025-04-07 RX ADMIN — SODIUM CHLORIDE 75 ML/HR: 0.9 INJECTION, SOLUTION INTRAVENOUS at 06:05

## 2025-04-07 RX ADMIN — INSULIN LISPRO 2 UNITS: 100 INJECTION, SOLUTION INTRAVENOUS; SUBCUTANEOUS at 16:45

## 2025-04-07 RX ADMIN — BISACODYL 10 MG: 10 SUPPOSITORY RECTAL at 08:10

## 2025-04-07 RX ADMIN — POLYETHYLENE GLYCOL 3350 17 G: 17 POWDER, FOR SOLUTION ORAL at 08:10

## 2025-04-07 RX ADMIN — INSULIN LISPRO 2 UNITS: 100 INJECTION, SOLUTION INTRAVENOUS; SUBCUTANEOUS at 11:24

## 2025-04-07 RX ADMIN — SODIUM CHLORIDE 75 ML/HR: 0.9 INJECTION, SOLUTION INTRAVENOUS at 18:41

## 2025-04-07 RX ADMIN — ATORVASTATIN CALCIUM 20 MG: 20 TABLET, FILM COATED ORAL at 08:10

## 2025-04-07 RX ADMIN — LIDOCAINE HYDROCHLORIDE 5 ML: 10 INJECTION, SOLUTION EPIDURAL; INFILTRATION; INTRACAUDAL; PERINEURAL at 12:00

## 2025-04-07 RX ADMIN — SENNOSIDES 17.2 MG: 8.6 TABLET, FILM COATED ORAL at 21:21

## 2025-04-07 RX ADMIN — Medication 2000 UNITS: at 08:10

## 2025-04-07 RX ADMIN — CYANOCOBALAMIN TAB 500 MCG 1000 MCG: 500 TAB at 08:10

## 2025-04-07 RX ADMIN — HYDROCHLOROTHIAZIDE 25 MG: 25 TABLET ORAL at 08:10

## 2025-04-07 RX ADMIN — INSULIN LISPRO 1 UNITS: 100 INJECTION, SOLUTION INTRAVENOUS; SUBCUTANEOUS at 21:21

## 2025-04-07 NOTE — PHYSICAL THERAPY NOTE
Physical Therapy Cancellation Note               04/07/25 1203   PT Last Visit   PT Visit Date 04/07/25   Note Type   Note Type Cancelled Session   Cancel Reasons Patient off floor/test  (Attempted to see pt for PT session. pt currently off floor. will follow up as able.)     RAGHU AliceaT

## 2025-04-07 NOTE — PROGRESS NOTES
"Progress Note - Hospitalist   Name: Alexis Dennison 64 y.o. male I MRN: [id number]  Unit/Bed#: [facility] 705-01 I Date of Admission: 3/29/2025   Date of Service: 4/7/2025 I Hospital Day: 9    Assessment & Plan  Brain tumor (HCC)  Patient with development of worsening confusion, recently seen at the [facility] ED 3/24/2025 with CT head at that time with finding of brain lesion for which MRI was advised to exclude cancerous etiology.     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  Lymphoma/leukemia panel nondiagnostic  CT head on 4/3 with interval increase in ventricular caliber concerning for worsening hydrocephalus  No indication for AED per neurosurgery  Continue neurochecks   Neurosurgery and oncology recommending repeat high-volume LP as previous was nondiagnostic  Discussed with IR and radiology, plan for LP today with radiology.    Encephalopathy  Due to brain mass as above  Patient with acute onset confusion, forgetting names/places, oriented to self only most of the times  Delirium precautions  CT on 4/3 with findings concerning of worsening hydrocephalus  Type 2 diabetes mellitus with hyperglycemia, without long-term current use of insulin (HCC)  Has been well-controlled as outpatient with hemoglobin A1c of 6.6%  Hold home oral medications, start correctional insulin coverage   Accu-Cheks reviewed and acceptable  Continue hypoglycemia protocol and carb controlled diet  Mixed hyperlipidemia  Continue " statin  HTN, goal below 130/80  Continue losartan and hydrochlorothiazide  BP reviewed and acceptable  Liver lesion  MRI obtained: No suspicious hepatic lesions.  There is a simple right hepatic lobe cyst.  Mild diffuse hepatic steatosis.  LFTs stable  Outpatient follow up advised  Thyroid nodule  Incidental findings on CT scan  Nonemergent thyroid ultrasound for follow-up in the outpatient setting  Pulmonary nodule  Imaging with 4 mm right lower lobe pulmonary nodule.  CT chest 3-month follow-up  Constipation  Continue senna, MiraLAX and suppository  Ambulate patient as able  Ambulatory dysfunction  PT/OT recommending level 2 at discharge  Awaiting medically stability  Ambulate patient as able  Fall precautions    VTE Pharmacologic Prophylaxis: VTE Score: 5 High Risk (Score >/= 5) - Pharmacological DVT Prophylaxis Contraindicated. Sequential Compression Devices Ordered.    Mobility:   Basic Mobility Inpatient Raw Score: 22  JH-HLM Goal: 7: Walk 25 feet or more  JH-HLM Achieved: 7: Walk 25 feet or more  JH-HLM Goal achieved. Continue to encourage appropriate mobility.    Patient Centered Rounds: I performed bedside rounds with nursing staff today.   Discussions with Specialists or Other Care Team Provider: , IR, radiology    Education and Discussions with Family / Patient:  Will update wife.     Current Length of Stay: 9 day(s)  Current Patient Status: Inpatient   Certification Statement: The patient will continue to require additional inpatient hospital stay due to as above  Discharge Plan: Anticipate discharge in >72 hrs to rehab facility.    Code Status: Level 1 - Full Code    Subjective   No events overnight.  Patient has no complaints this morning.  Appears more tired this morning.  Denies pain.    Objective :  Temp:  [98.2 °F (36.8 °C)-98.9 °F (37.2 °C)] 98.2 °F (36.8 °C)  HR:  [49-56] 54  BP: (114-138)/(62-77) 135/72  Resp:  [15-16] 16  SpO2:  [95 %-98 %] 96 %  O2 Device: None (Room air)    Body  mass index is 27.26 kg/m².     Input and Output Summary (last 24 hours):     Intake/Output Summary (Last 24 hours) at 4/7/2025 0831  Last data filed at 4/7/2025 0605  Gross per 24 hour   Intake 2872.5 ml   Output 750 ml   Net 2122.5 ml       Physical Exam  Vitals and nursing note reviewed.   Constitutional:       General: He is not in acute distress.     Appearance: He is well-developed.   HENT:      Head: Normocephalic and atraumatic.   Eyes:      Conjunctiva/sclera: Conjunctivae normal.   Cardiovascular:      Rate and Rhythm: Normal rate and regular rhythm.   Pulmonary:      Effort: No respiratory distress.      Breath sounds: Normal breath sounds. No wheezing or rhonchi.   Abdominal:      Palpations: Abdomen is soft.      Tenderness: There is no abdominal tenderness.   Musculoskeletal:         General: No swelling.      Cervical back: Neck supple.   Skin:     General: Skin is warm and dry.   Neurological:      Mental Status: He is alert. He is disoriented.           Lines/Drains:              Lab Results: I have reviewed the following results:   Results from last 7 days   Lab Units 04/06/25  0501   WBC Thousand/uL 6.78   HEMOGLOBIN g/dL 14.7   HEMATOCRIT % 41.5   PLATELETS Thousands/uL 196   SEGS PCT % 56   LYMPHO PCT % 35   MONO PCT % 6   EOS PCT % 2     Results from last 7 days   Lab Units 04/06/25  0501 04/04/25  0908   SODIUM mmol/L 141 141   POTASSIUM mmol/L 4.1 3.6   CHLORIDE mmol/L 105 107   CO2 mmol/L 29 27   BUN mg/dL 15 14   CREATININE mg/dL 1.00 1.02   ANION GAP mmol/L 7 7   CALCIUM mg/dL 9.4 8.5   ALBUMIN g/dL  --  3.5   TOTAL BILIRUBIN mg/dL  --  0.90   ALK PHOS U/L  --  41   ALT U/L  --  22   AST U/L  --  11*   GLUCOSE RANDOM mg/dL 111 135     Results from last 7 days   Lab Units 04/05/25  0429   INR  1.03     Results from last 7 days   Lab Units 04/07/25  0617 04/06/25  2048 04/06/25  1532 04/06/25  1041 04/06/25  0610 04/05/25  2119 04/05/25  1559 04/05/25  1105 04/05/25  0638 04/04/25  2147  04/04/25  1610 04/04/25  1114   POC GLUCOSE mg/dl 107 162* 151* 163* 105 122 128 140 116 202* 188* 128               Recent Cultures (last 7 days):   Results from last 7 days   Lab Units 03/31/25  1237   GRAM STAIN RESULT  No Polys or Bacteria seen  Rare Mononuclear Cells       Imaging Results Review: No pertinent imaging studies reviewed.  Other Study Results Review: No additional pertinent studies reviewed.    Last 24 Hours Medication List:     Current Facility-Administered Medications:     acetaminophen (TYLENOL) tablet 650 mg, Q6H PRN    aluminum-magnesium hydroxide-simethicone (MAALOX) oral suspension 30 mL, Q4H PRN    [Held by provider] aspirin (ECOTRIN LOW STRENGTH) EC tablet 81 mg, Daily    atorvastatin (LIPITOR) tablet 20 mg, Daily    bisacodyl (DULCOLAX) rectal suppository 10 mg, Daily    Cholecalciferol (VITAMIN D3) tablet 2,000 Units, Daily    cyanocobalamin (VITAMIN B-12) tablet 1,000 mcg, Daily    [Held by provider] enoxaparin (LOVENOX) subcutaneous injection 40 mg, Q24H CAIT    losartan (COZAAR) tablet 100 mg, Daily **AND** hydroCHLOROthiazide tablet 25 mg, Daily    insulin lispro (HumALOG/ADMELOG) 100 units/mL subcutaneous injection 1-5 Units, HS    insulin lispro (HumALOG/ADMELOG) 100 units/mL subcutaneous injection 1-6 Units, TID AC **AND** Fingerstick Glucose (POCT), TID AC    ondansetron (ZOFRAN) injection 4 mg, Q6H PRN    pantoprazole (PROTONIX) EC tablet 20 mg, Early Morning    polyethylene glycol (MIRALAX) packet 17 g, Daily    senna (SENOKOT) tablet 17.2 mg, HS    sodium chloride 0.9 % infusion, Continuous, Last Rate: 75 mL/hr (04/07/25 0605)    Administrative Statements   Today, Patient Was Seen By: Dionne Huang MD      **Please Note: This note may have been constructed using a voice recognition system.**

## 2025-04-07 NOTE — SPEECH THERAPY NOTE
"Speech/Language Evaluation      Patient Name: Alexis Dennison    Today's Date:  List  Principal Problem:    Brain tumor (HCC)  Active Problems:    Type 2 diabetes mellitus with hyperglycemia, without long-term current use of insulin (HCC)    HTN, goal below 130/80    Mixed hyperlipidemia    Thyroid nodule    Pulmonary nodule    Liver lesion    Constipation    Ambulatory dysfunction    Encephalopathy      Past Medical History  Past Medical History:   Diagnosis Date    Colon polyp     Diabetes mellitus (HCC)     GERD (gastroesophageal reflux disease)     Hyperlipidemia     Hypertension     Liver disease     Sleep apnea        Past Surgical History  Past Surgical History:   Procedure Laterality Date    COLONOSCOPY      CYST REMOVAL      back    FL LUMBAR PUNCTURE DIAGNOSTIC  3/31/2025    NM EXC B9 LESION MRGN XCP SK TG T/A/L 0.6-1.0 CM N/A 2023    Procedure: EXCISION  BIOPSY LESION/MASS ABDOMINAL/CHEST WALL;  Surgeon: Trevor Villavicencio MD;  Location:  MAIN OR;  Service: General         Summary   The patient presents with significant cognitive impairment. He has decreased eye contact and attention during evaluation. The patient needs extra time to respond to questions. He is easily distracted by the TV, but once TV is off he is distracted by looking out the window. Difficult to fully assess language skills. The patient knows his name, but does not recall age,  or where he lives. He does not recall that we are in a hospital. The patient has difficulty naming objects, pointing to objects in a field of 2 and with responsive naming tasks. He is able to answer 4/7 yes/no questions accurately. He is able to complete phrase completion and automatic tasks. Patient is not able to point to objects around the room. He does appear to have some insight as he states \"I'm not doing very well. I'm having a hard time\" upon SLP arrival.     Recommendation:  ST as able in acute care.   Therapy Prognosis: fair  Prognosis " considerations: cognitive status    Patient's goal:   none stated     Long-Term Goal:  The patient will demonstrate auditory comprehension related to social and medical needs and utilize compensatory strategies to maintain safety and participate socially in therapy tasks and activities of daily living    Patient will communicate wants, needs and opinions effectively with different conversational partners in a variety of ADLs    Short Term Goals:   The patient will receptively identify objects named in ADLs a visual field of 2 with 90% accuracy and min cues.    The patient will follow 2-step commands related to functional living environment with 90% accuracy given min cues.    Patient will name objects/pictures /people with 90% accuracy given (min/mod/max) cues.    Patient will provide 2 members in a given category with 90% accuracy and (min/mod/max) cues    Patient will be educated on strategies to improve STM    Current Medical:       Chief Complaint:     Worsening confusion in setting of recently discovered brain mass  History of Present Illness:  Alexis Dennison is a 64 y.o. male who presents for evaluation of worsening confusion in the setting of recently discovered brain mass.  For the past several months he was noted by family with increasing confusion particularly being confused to current events, occasionally names of family members, and with forgetfulness regarding use of objects particularly phone.  He was worked up for this by PCP apparently with initial unrevealing workup however was seen at the [facility] ED 3/24/2025 where a CT head was obtained with finding of brain lesion for which MRI was advised to exclude cancerous etiology.  He had subsequent follow-up with his PCP who arranged for outpatient MRI brain with findings concerning for potential malignancy as detailed below.  He was arranged for expedited outpatient neurosurgical evaluation, however in the interim he was noted by family with progression of his  "confusion and apparent development of double vision for which he was brought here for further evaluation.  Patient himself denied any fever/chills, headaches, focal weakness, numbness/tingling/paresthesias, weight loss, or other systemic symptoms; son-in-law at bedside states he does not believe that patient has had any apparent seizure-like activity.   During ED evaluation basic labs were obtained and noted without abnormality, CT head was repeated appearing stable compared to prior imaging, and CTA chest/abdomen/pelvis was found with findings as detailed.  Given worsening of his confusion/dermatology in setting of brain mass he is admitted with plans for neurosurgery and oncology consultations.    General Cognitive Status:  Poor eye contact and attention     Auditory Comprehension:  Body part ID: n/a  Left vs Right Body part: n/a  One step commands: impaired  Two step commands: n/a  Picture ID: impaired  Letter ID: n/a  y/n ?'s: 4/7   Comprehension of Conversation: slow to respond. Patient unable to answer all questions accurately     Reading Comprehension:  Patient unable to read clock or food bag in room     Speech Mechanism Examination:   Not formally assessed, but no asymmetry or weakness observed    Oral Expression:  Auto Sequences:   Count Forwards: wfl  Automatic Social Utterances wfl  Word Repetition: 50%  Phrase Completion: 80%  Confrontation Naming: impaired  Responsive Naming: impaired  Divergent Naming: n/a  Picture Description: impaired  Conversation: patient participates in basic conversation   Able to make basic needs known:  yes    Written Expression:  Not assessed     Motor Speech:  No dysarthria or apraxia observed    Orientation:  Person wf  Place: [facility]: impaired  City: impaired  Time of Day: impaired  Month: impaired \"January\"   [place]: n/a  Year:  n/a  Reason for hospitalization: impaired    Cognitive -linguistic skills:  Impaired  Needs further testing     Also " noted:  Distractable  Inattention  Perseveration  Partially aware of deficits

## 2025-04-07 NOTE — UTILIZATION REVIEW
"    Continued Stay Review    Date: 25                     Current Patient Class: Inpatient Current Level of Care: med surg    Certification Statement: The patient will continue to require additional inpatient hospital stay due to as above  Discharge Plan: Anticipate discharge in >72 hrs to rehab facility.    HPI:64 y.o. male initially admitted on 3-29-25     Current Diagnosis:    Brain tumor  DM2  HTN goal 130/80  Liver lesion  Encephalopathy    Assessment/Plan:     CT head on 4/3 with interval increase in ventricular caliber concerning for worsening hydrocephalus.  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 Lymphoma/leukemia panel nondiagnostic   Patient with significant cognitive impairment.  Poor attention span, decreased eye contact.  Difficult for speech therapist to fully assess language skills.  Patient knows his name but not age, , address.  He has difficulty naming objects.  He answers yes or no to  questions 4/7 times.  Difficulty pointing out objects in the room.  Self aware stating \" I am not doing well. I am having a hard time.\"   Neurosurgery and oncology recommending high volume LP by IR today.  Continue iv .9% ns 75/hr.     Start   Ordered   25 1259  CSF white cell count with differential  Once        Status: Collected (25 1300)    25 1258   25 1259  Glucose CSF  Once        Status: In process    25 1258   25 1259  Protein CSF  Once        Status: In process    25 1258   25 1259  Gram stain CSF  Once        Status: In process    25 1258   25 1259  Leukemia/Lymphoma flow cytometry  Once        Status: In process    25 1258   25 1259  Spinal fluid culture and Gram stain  Once        Status: In process    25 1258   25 1259  RBC count,CSF  Once        Status: In process    25 1258   25 0842  Leukemia/Lymphoma flow cytometry  Once        Status: In process    25 0841 "   03/31/25 0835  BRIAN, body fluid  Once        Status: In process    03/31/25 0834           Scheduled Medications:    [Held by provider] aspirin, 81 mg, Oral, Daily  atorvastatin, 20 mg, Oral, Daily  bisacodyl, 10 mg, Rectal, Daily  Cholecalciferol, 2,000 Units, Oral, Daily  vitamin B-12, 1,000 mcg, Oral, Daily  [Held by provider] enoxaparin, 40 mg, Subcutaneous, Q24H CAIT  losartan, 100 mg, Oral, Daily   And  hydroCHLOROthiazide, 25 mg, Oral, Daily  insulin lispro, 1-5 Units, Subcutaneous, HS  insulin lispro, 1-6 Units, Subcutaneous, TID AC  pantoprazole, 20 mg, Oral, Early Morning  polyethylene glycol, 17 g, Oral, Daily  senna, 2 tablet, Oral, HS      Continuous IV Infusions:  sodium chloride, 75 mL/hr, Intravenous, Continuous      PRN Meds:  acetaminophen, 650 mg, Oral, Q6H PRN  aluminum-magnesium hydroxide-simethicone, 30 mL, Oral, Q4H PRN  ondansetron, 4 mg, Intravenous, Q6H PRN      Discharge Plan: to be determined     Vital Signs (last 3 days)       Date/Time Temp Pulse Resp BP MAP  SpO2 O2 Device [place] Coma Scale Score Pain    04/07/25 11:07:35 97.9 °F (36.6 °C) 64 17 129/74 92 98 % None (Room air) -- --    04/07/25 0800 -- -- -- -- -- -- -- 14 No Pain    04/07/25 07:10:16 98.2 °F (36.8 °C) 54 16 135/72 93 96 % -- -- --    04/07/25 04:11:16 -- 49 -- 134/74 94 95 % -- -- --    04/07/25 0400 -- -- -- -- -- -- -- 14 --    04/07/25 00:50:08 -- 54 -- 138/77 97 95 % -- -- --    04/07/25 00:49:42 -- 52 -- 138/77 97 96 % -- -- --    04/07/25 0000 -- -- -- -- -- -- -- 14 --    04/06/25 20:07:42 98.8 °F (37.1 °C) 55 -- 114/62 79 98 % -- -- --    04/06/25 2000 -- -- -- -- -- 95 % None (Room air) 14 No Pain    04/06/25 1600 -- -- -- -- -- -- -- 14 --    04/06/25 15:31:17 98.7 °F (37.1 °C) 55 15 132/74 93 97 % None (Room air) -- --    04/06/25 15:26:01 98.9 °F (37.2 °C) 56 -- 132/74 93 -- -- -- --    04/06/25 1200 -- -- -- -- -- 98 % -- 14 --    04/06/25 08:07:58 97.8 °F (36.6 °C) 61 18 138/76 97 96 % -- -- --     04/06/25 0800 -- -- -- -- -- -- -- 14 No Pain    04/06/25 04:03:53 98.3 °F (36.8 °C) 51 18 136/76 96 95 % -- -- --    04/06/25 0400 -- -- -- -- -- -- -- 14 --    04/06/25 00:16:44 98.8 °F (37.1 °C) 54 18 121/54 76 97 % -- -- --    04/06/25 0000 -- -- -- -- -- -- -- 14 --    04/05/25 2000 97.8 °F (36.6 °C) 61 16 127/56 80 96 % None (Room air) 14 No Pain    04/05/25 19:39:26 97.4 °F (36.3 °C) 62 -- 146/73 97 96 % -- -- --    04/05/25 1600 -- -- -- -- -- -- -- 14 --    04/05/25 15:59:04 98 °F (36.7 °C) 56 -- 135/74 94 98 % -- -- --    04/05/25 1200 -- -- -- -- -- -- -- 14 --    04/05/25 0800 -- -- -- -- -- -- -- 14 No Pain    04/05/25 07:33:28 98.4 °F (36.9 °C) 56 15 135/75 95 98 % -- -- --    04/05/25 07:32:58 98.4 °F (36.9 °C) 57 15 135/75 95 82 % -- -- --    04/04/25 22:52:56 98 °F (36.7 °C) 59 -- 149/79 102 97 % -- -- --    04/04/25 2100 -- -- -- -- -- -- -- -- No Pain    04/04/25 2000 -- -- -- -- -- -- -- 14 --    04/04/25 1208 -- -- -- -- -- -- -- -- No Pain    04/04/25 1200 -- -- -- -- -- -- -- 14 --    04/04/25 1020 -- -- -- 132/70 -- -- -- -- --    04/04/25 0900 -- -- -- 136/74 -- -- -- -- --    04/04/25 07:52:11 98 °F (36.7 °C) 57 22 141/77 98 94 % -- -- --    04/04/25 0400 -- -- -- -- -- -- -- 14 --    04/04/25 0010 -- -- -- -- -- -- -- 14 --          Weight (last 2 days)       None            Results from last 7 days   Lab Units 04/07/25  0922 04/06/25  0501 04/05/25  0429 04/04/25  0908 04/01/25  0535   WBC Thousand/uL 7.17 6.78  --  7.27 7.15   HEMOGLOBIN g/dL 15.3 14.7  --  13.7 14.0   HEMATOCRIT % 43.8 41.5  --  39.6 39.2   PLATELETS Thousands/uL 206 196 184 180 184   TOTAL NEUT ABS Thousands/µL  --  3.82  --  4.64  --          Results from last 7 days   Lab Units 04/06/25  0501 04/04/25  0908 04/01/25  0535   SODIUM mmol/L 141 141 140   POTASSIUM mmol/L 4.1 3.6 4.0   CHLORIDE mmol/L 105 107 107   CO2 mmol/L 29 27 26   ANION GAP mmol/L 7 7 7   BUN mg/dL 15 14 15   CREATININE mg/dL 1.00 1.02 1.03   EGFR  ml/min/1.73sq m 78 76 75   CALCIUM mg/dL 9.4 8.5 8.9   MAGNESIUM mg/dL  --   --  1.9     Results from last 7 days   Lab Units 04/04/25  0908   AST U/L 11*   ALT U/L 22   ALK PHOS U/L 41   TOTAL PROTEIN g/dL 5.7*   ALBUMIN g/dL 3.5   TOTAL BILIRUBIN mg/dL 0.90     Results from last 7 days   Lab Units 04/07/25  1052 04/07/25  0617 04/06/25  2048 04/06/25  1532 04/06/25  1041 04/06/25  0610 04/05/25  2119 04/05/25  1559 04/05/25  1105 04/05/25  0638 04/04/25  2144 04/04/25  1610   POC GLUCOSE mg/dl 190* 107 162* 151* 163* 105 122 128 140 116 202* 188*     Results from last 7 days   Lab Units 04/06/25  0501 04/04/25  0908 04/01/25  0535   GLUCOSE RANDOM mg/dL 111 135 115           Results from last 7 days   Lab Units 04/07/25  0922 04/05/25  0429   PROTIME seconds 12.9 13.8   INR  0.95 1.03   PTT seconds 27  --        Network Utilization Review Department  ATTENTION: Please call with any questions or concerns to [phone] and carefully listen to the prompts so that you are directed to the right person. All voicemails are confidential.   For Discharge needs, contact Care Management DC Support Team at [phone] opt. 2  Send all requests for admission clinical reviews, approved or denied determinations and any other requests to dedicated fax number below belonging to the campus where the patient is receiving treatment. List of dedicated fax numbers for the Facilities:  FACILITY NAME UR FAX NUMBER   ADMISSION DENIALS (Administrative/Medical Necessity) [phone]   DISCHARGE SUPPORT TEAM (NETWORK) [phone]   PARENT CHILD HEALTH (Maternity/NICU/Pediatrics) [phone]   [facility] [phone]   [facility] [phone]   [facility] [phone]   [facility] [phone]   [facility] [phone]   [facility] [phone]   [facility]  [phone]   [facility] [phone]   [facility] [phone]   [facility] [phone]   General acute hospital [phone]   [facility] [phone]

## 2025-04-07 NOTE — PLAN OF CARE
Problem: PAIN - ADULT  Goal: Verbalizes/displays adequate comfort level or baseline comfort level  Description: Interventions:- Encourage patient to monitor pain and request assistance- Assess pain using appropriate pain scale- Administer analgesics based on type and severity of pain and evaluate response- Implement non-pharmacological measures as appropriate and evaluate response- Notify physician/advanced practitioner if interventions unsuccessful or patient reports new pain  Outcome: Progressing     Problem: INFECTION - ADULT  Goal: Absence or prevention of progression during hospitalization  Description: INTERVENTIONS:- Assess and monitor for signs and symptoms of infection- Monitor lab/diagnostic results- Monitor all insertion sites, i.e. indwelling lines, tubes, and drains- [place] appropriate cooling/warming therapies per order- Administer medications as ordered- Instruct and encourage patient and family to use good hand hygiene technique- Identify and instruct in appropriate isolation precautions for identified infection/condition  Outcome: Progressing     Problem: SAFETY ADULT  Goal: Patient will remain free of falls  Description: INTERVENTIONS:- Educate patient/family on patient safety including physical limitations- Instruct patient to call for assistance with activity - Consult OT/PT to assist with strengthening/mobility - Keep Call bell within reach- Keep bed low and locked with side rails adjusted as appropriate- Keep care items and personal belongings within reach- Initiate and maintain comfort rounds- Make Fall Risk Sign visible to staff- Offer Toileting every 2 Hours, in advance of need- Initiate/Maintain bed/chair alarm- Obtain necessary fall risk management equipment.- Apply yellow socks and bracelet for high fall risk patients- Consider moving patient to room near nurses station  Outcome: Progressing  Goal: Maintain or return to baseline ADL function  Description: INTERVENTIONS:-  Assess  patient's ability to carry out ADLs; assess patient's baseline for ADL function and identify physical deficits which impact ability to perform ADLs (bathing, care of mouth/teeth, toileting, grooming, dressing, etc.)- Assess/evaluate cause of self-care deficits - Assess range of motion- Assess patient's mobility; develop plan if impaired- Assess patient's need for assistive devices and provide as appropriate- Encourage maximum independence but intervene and supervise when necessary- Involve family in performance of ADLs- Assess for home care needs following discharge - Consider OT consult to assist with ADL evaluation and planning for discharge- Provide patient education as appropriate  Outcome: Progressing  Goal: Maintains/Returns to pre admission functional level  Description: INTERVENTIONS:- Perform AM-PAC 6 Click Basic Mobility/ Daily Activity assessment daily.- Set and communicate daily mobility goal to care team and patient/family/caregiver. - Collaborate with rehabilitation services on mobility goals if consulted- Reposition patient every 2 hours.- Dangle patient 3 times a day- Stand patient 3 times a day- Ambulate patient 3 times a day- Out of bed to chair 3 times a day - Out of bed for meals 3 times a day- Out of bed for toileting- Record patient progress and toleration of activity level   Outcome: Progressing     Problem: DISCHARGE PLANNING  Goal: Discharge to home or other facility with appropriate resources  Description: INTERVENTIONS:- Identify barriers to discharge w/patient and caregiver- Arrange for needed discharge resources and transportation as appropriate- Identify discharge learning needs (meds, wound care, etc.)- Refer to Case Management Department for coordinating discharge planning if the patient needs post-hospital services based on physician/advanced practitioner order or complex needs related to functional status, cognitive ability, or social support system  Outcome: Progressing      Problem: Knowledge Deficit  Goal: Patient/family/caregiver demonstrates understanding of disease process, treatment plan, medications, and discharge instructions  Description: Complete learning assessment and assess knowledge base.Interventions:- Provide teaching at level of understanding- Provide teaching via preferred learning methods  Outcome: Progressing     Problem: NEUROSENSORY - ADULT  Goal: Achieves stable or improved neurological status  Description: INTERVENTIONS- Monitor and report changes in neurological status- Monitor vital signs such as temperature, blood pressure, glucose, and any other labs ordered - Initiate measures to prevent increased intracranial pressure- Monitor for seizure activity and implement precautions if appropriate    Outcome: Progressing  Goal: Remains free of injury related to seizures activity  Description: INTERVENTIONS- Maintain airway, patient safety  and administer oxygen as ordered- Monitor patient for seizure activity, document and report duration and description of seizure to physician/advanced practitioner- If seizure occurs,  ensure patient safety during seizure- Reorient patient post seizure- Seizure pads on all 4 side rails- Instruct patient/family to notify RN of any seizure activity including if an aura is experienced- Instruct patient/family to call for assistance with activity based on nursing assessment- Administer anti-seizure medications if ordered  Outcome: Progressing  Goal: Achieves maximal functionality and self care  Description: INTERVENTIONS- Monitor swallowing and airway patency with patient fatigue and changes in neurological status- Encourage and assist patient to increase activity and self care. - Encourage visually impaired, hearing impaired and aphasic patients to use assistive/communication devices  Outcome: Progressing     Problem: METABOLIC, FLUID AND ELECTROLYTES - ADULT  Goal: Glucose maintained within target range  Description: INTERVENTIONS:-  Monitor Blood Glucose as ordered- Assess for signs and symptoms of hyperglycemia and hypoglycemia- Administer ordered medications to maintain glucose within target range- Assess nutritional intake and initiate nutrition service referral as needed  Outcome: Progressing     Problem: Prexisting or High Potential for Compromised Skin Integrity  Goal: Skin integrity is maintained or improved  Description: INTERVENTIONS:- Identify patients at risk for skin breakdown- Assess and monitor skin integrity- Assess and monitor nutrition and hydration status- Monitor labs - Assess for incontinence - Turn and reposition patient- Assist with mobility/ambulation- Relieve pressure over bony prominences- Avoid friction and shearing- Provide appropriate hygiene as needed including keeping skin clean and dry- Evaluate need for skin moisturizer/barrier cream- Collaborate with interdisciplinary team - Patient/family teaching- Consider wound care consult   Outcome: Progressing

## 2025-04-07 NOTE — RESTORATIVE TECHNICIAN NOTE
Restorative Technician Note      Patient Name: Alexis Dennison     Note Type: Mobility  Patient Position Upon Consult: Bedside chair  Activity Performed: Ambulated; Dangled; Stood  Assistive Device: Other (Comment) (A x1)  Education Provided: Yes  Patient Position at End of Consult: Bedside chair; All needs within reach; Bed/Chair alarm activated    Cande BANEGAS, Restorative Technician,

## 2025-04-07 NOTE — ASSESSMENT & PLAN NOTE
"Patient with development of worsening confusion, recently seen at the [facility] ED 3/24/2025 with CT head at that time with finding of brain lesion for which MRI was advised to exclude cancerous etiology.     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  Lymphoma/leukemia panel nondiagnostic  CT head on 4/3 with interval increase in ventricular caliber concerning for worsening hydrocephalus  No indication for AED per neurosurgery  Continue neurochecks   Neurosurgery and oncology recommending repeat high-volume LP as previous was nondiagnostic  Discussed with IR and radiology, plan for LP today with radiology.    "

## 2025-04-08 LAB
ANION GAP SERPL CALCULATED.3IONS-SCNC: 7 MMOL/L (ref 4–13)
BASOPHILS # BLD AUTO: 0.04 THOUSANDS/ÂΜL (ref 0–0.1)
BASOPHILS NFR BLD AUTO: 1 % (ref 0–1)
BUN SERPL-MCNC: 16 MG/DL (ref 5–25)
CALCIUM SERPL-MCNC: 9.4 MG/DL (ref 8.4–10.2)
CHLORIDE SERPL-SCNC: 105 MMOL/L (ref 96–108)
CO2 SERPL-SCNC: 28 MMOL/L (ref 21–32)
CREAT SERPL-MCNC: 0.98 MG/DL (ref 0.6–1.3)
EOSINOPHIL # BLD AUTO: 0.13 THOUSAND/ÂΜL (ref 0–0.61)
EOSINOPHIL NFR BLD AUTO: 2 % (ref 0–6)
ERYTHROCYTE [DISTWIDTH] IN BLOOD BY AUTOMATED COUNT: 11.9 % (ref 11.6–15.1)
GFR SERPL CREATININE-BSD FRML MDRD: 80 ML/MIN/1.73SQ M
GLUCOSE SERPL-MCNC: 109 MG/DL (ref 65–140)
GLUCOSE SERPL-MCNC: 151 MG/DL (ref 65–140)
GLUCOSE SERPL-MCNC: 158 MG/DL (ref 65–140)
GLUCOSE SERPL-MCNC: 166 MG/DL (ref 65–140)
GLUCOSE SERPL-MCNC: 95 MG/DL (ref 65–140)
HCT VFR BLD AUTO: 43.3 % (ref 36.5–49.3)
HGB BLD-MCNC: 15.4 G/DL (ref 12–17)
IMM GRANULOCYTES # BLD AUTO: 0.02 THOUSAND/UL (ref 0–0.2)
IMM GRANULOCYTES NFR BLD AUTO: 0 % (ref 0–2)
LYMPHOCYTES # BLD AUTO: 2.78 THOUSANDS/ÂΜL (ref 0.6–4.47)
LYMPHOCYTES NFR BLD AUTO: 37 % (ref 14–44)
MCH RBC QN AUTO: 31.2 PG (ref 26.8–34.3)
MCHC RBC AUTO-ENTMCNC: 35.6 G/DL (ref 31.4–37.4)
MCV RBC AUTO: 88 FL (ref 82–98)
MONOCYTES # BLD AUTO: 0.46 THOUSAND/ÂΜL (ref 0.17–1.22)
MONOCYTES NFR BLD AUTO: 6 % (ref 4–12)
NEUTROPHILS # BLD AUTO: 4.04 THOUSANDS/ÂΜL (ref 1.85–7.62)
NEUTS SEG NFR BLD AUTO: 54 % (ref 43–75)
NRBC BLD AUTO-RTO: 0 /100 WBCS
PLATELET # BLD AUTO: 189 THOUSANDS/UL (ref 149–390)
PMV BLD AUTO: 10.3 FL (ref 8.9–12.7)
POTASSIUM SERPL-SCNC: 4 MMOL/L (ref 3.5–5.3)
RBC # BLD AUTO: 4.94 MILLION/UL (ref 3.88–5.62)
SODIUM SERPL-SCNC: 140 MMOL/L (ref 135–147)
WBC # BLD AUTO: 7.47 THOUSAND/UL (ref 4.31–10.16)

## 2025-04-08 PROCEDURE — 99232 SBSQ HOSP IP/OBS MODERATE 35: CPT | Performed by: FAMILY MEDICINE

## 2025-04-08 PROCEDURE — 82948 REAGENT STRIP/BLOOD GLUCOSE: CPT

## 2025-04-08 PROCEDURE — 97535 SELF CARE MNGMENT TRAINING: CPT

## 2025-04-08 PROCEDURE — 85025 COMPLETE CBC W/AUTO DIFF WBC: CPT | Performed by: STUDENT IN AN ORGANIZED HEALTH CARE EDUCATION/TRAINING PROGRAM

## 2025-04-08 PROCEDURE — 92610 EVALUATE SWALLOWING FUNCTION: CPT

## 2025-04-08 PROCEDURE — 92507 TX SP LANG VOICE COMM INDIV: CPT

## 2025-04-08 PROCEDURE — 80048 BASIC METABOLIC PNL TOTAL CA: CPT | Performed by: STUDENT IN AN ORGANIZED HEALTH CARE EDUCATION/TRAINING PROGRAM

## 2025-04-08 RX ADMIN — SODIUM CHLORIDE 75 ML/HR: 0.9 INJECTION, SOLUTION INTRAVENOUS at 09:43

## 2025-04-08 RX ADMIN — SENNOSIDES 17.2 MG: 8.6 TABLET, FILM COATED ORAL at 21:44

## 2025-04-08 RX ADMIN — ATORVASTATIN CALCIUM 20 MG: 20 TABLET, FILM COATED ORAL at 09:48

## 2025-04-08 RX ADMIN — Medication 2000 UNITS: at 09:48

## 2025-04-08 RX ADMIN — LOSARTAN POTASSIUM 100 MG: 50 TABLET, FILM COATED ORAL at 09:48

## 2025-04-08 RX ADMIN — INSULIN LISPRO 1 UNITS: 100 INJECTION, SOLUTION INTRAVENOUS; SUBCUTANEOUS at 11:31

## 2025-04-08 RX ADMIN — PANTOPRAZOLE SODIUM 20 MG: 20 TABLET, DELAYED RELEASE ORAL at 05:38

## 2025-04-08 RX ADMIN — INSULIN LISPRO 1 UNITS: 100 INJECTION, SOLUTION INTRAVENOUS; SUBCUTANEOUS at 21:44

## 2025-04-08 RX ADMIN — HYDROCHLOROTHIAZIDE 25 MG: 25 TABLET ORAL at 09:48

## 2025-04-08 RX ADMIN — CYANOCOBALAMIN TAB 500 MCG 1000 MCG: 500 TAB at 09:48

## 2025-04-08 RX ADMIN — INSULIN LISPRO 1 UNITS: 100 INJECTION, SOLUTION INTRAVENOUS; SUBCUTANEOUS at 16:26

## 2025-04-08 NOTE — ASSESSMENT & PLAN NOTE
"Patient with development of worsening confusion, recently seen at the [facility] ED 3/24/2025 with CT head at that time with finding of brain lesion for which MRI was advised to exclude cancerous etiology.     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  Lymphoma/leukemia panel nondiagnostic  CT head on 4/3 with interval increase in ventricular caliber concerning for worsening hydrocephalus  No indication for AED per neurosurgery  Continue neurochecks   Neurosurgery and oncology recommending repeat high-volume LP as previous was nondiagnostic  Post LP 4/7, results pending  "

## 2025-04-08 NOTE — CASE MANAGEMENT
Case Management Discharge Planning Note    Patient name Alexis Dennison  Location [facility] MRN [id number]  : 1960 Date 2025       Current Admission Date: 3/29/2025  Current Admission Diagnosis:Brain tumor (HCC)   Patient Active Problem List    Diagnosis Date Noted Date Diagnosed    Encephalopathy 2025     Constipation 2025     Ambulatory dysfunction 2025     Thyroid nodule 2025     Pulmonary nodule 2025     Liver lesion 2025     Brain tumor (HCC) 2025     Type 2 diabetes mellitus with hyperglycemia, without long-term current use of insulin (HCC) 2022     HTN, goal below 130/80 2022     Mixed hyperlipidemia 2022     Vitamin D deficiency 2022     NAFLD (nonalcoholic fatty liver disease) 2022     Fatigue 2022       LOS (days): 10  Geometric Mean LOS (GMLOS) (days): 6.7  Days to GMLOS:-3     OBJECTIVE:  Risk of Unplanned Readmission Score: 11.73         Current admission status: Inpatient   Preferred Pharmacy:   [facility]/pharmacy #[id number] - [place], [place] - [address] [address]  [address] [address]  Phone: [phone] Fax: [phone]    [email or web address] - [place], [place] - [address]  [address]  [facility] [address]  Phone: [phone] Fax: [phone]    Primary Care Provider: PATI Mckeon    Primary Insurance: BLUE CROSS  Secondary Insurance: CAPITAL    DISCHARGE DETAILS:             Additional Comments: Patient awaiting results of 2nd LP, which was performed yesterday.

## 2025-04-08 NOTE — PLAN OF CARE
Problem: OCCUPATIONAL THERAPY ADULT  Goal: Performs self-care activities at highest level of function for planned discharge setting.  See evaluation for individualized goals.  Description: Treatment Interventions: ADL retraining, Visual perceptual retraining, Functional transfer training, UE strengthening/ROM, Endurance training, Cognitive reorientation, Patient/family training, Equipment evaluation/education, Compensatory technique education, Continued evaluation, Energy conservation, Activityengagement, Fine motor coordination activities  Equipment Recommended:  (tbd)       See flowsheet documentation for full assessment, interventions and recommendations.   Note: Limitation: Decreased ADL status, Decreased cognition, Decreased Safe judgement during ADL, Decreased UE ROM, Decreased UE strength, Decreased endurance, Decreased fine motor control, Decreased self-care trans, Decreased high-level ADLs  Prognosis: Poor, Fair  Assessment: Patient participated in Skilled OT session this date with interventions consisting of cognitive activities, self care tasks . Patient agreeable to OT treatment session, upon arrival patient was found seated OOB to Chair.  In comparison to previous session, patient with significant cognitive/speech deficits, limiting self care [place]. . Patient requiring verbal cues for safety, verbal cues for correct technique, verbal cues for pacing thru activity steps, cognitive assistance to anticipate next step, one step directives, and frequent rest periods. Patient continues to be functioning below baseline level, occupational performance remains limited secondary to factors listed above and increased risk for falls and injury.   From OT standpoint, recommendation at time of d/c would be mod level II.  The patient's raw score on the AM-PAC Daily Activity Inpatient Short Form is 9. A raw score of less than 19 suggests the patient may benefit from discharge to post-acute rehabilitation  services. Please refer to the recommendation of the Occupational Therapist for safe discharge planning.  Patient to benefit from continued Occupational Therapy treatment while in the hospital to address deficits as defined above and maximize level of functional independence with ADLs and functional mobility.     Rehab Resource Intensity Level, OT: II (Moderate Resource Intensity)

## 2025-04-08 NOTE — PROGRESS NOTES
"Progress Note - Hospitalist   Name: Alexis Dennison 64 y.o. male I MRN: [id number]  Unit/Bed#: [facility] 705-01 I Date of Admission: 3/29/2025   Date of Service: 4/8/2025 I Hospital Day: 10    Assessment & Plan  Brain tumor (HCC)  Patient with development of worsening confusion, recently seen at the [facility] ED 3/24/2025 with CT head at that time with finding of brain lesion for which MRI was advised to exclude cancerous etiology.     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  Lymphoma/leukemia panel nondiagnostic  CT head on 4/3 with interval increase in ventricular caliber concerning for worsening hydrocephalus  No indication for AED per neurosurgery  Continue neurochecks   Neurosurgery and oncology recommending repeat high-volume LP as previous was nondiagnostic  Post LP 4/7, results pending  Encephalopathy  Due to brain mass as above  Patient with acute onset confusion, forgetting names/places, oriented to self only most of the times  Delirium precautions  CT on 4/3 with findings concerning of worsening hydrocephalus  Type 2 diabetes mellitus with hyperglycemia, without long-term current use of insulin (HCC)  Has been well-controlled as outpatient with hemoglobin A1c of 6.6%  Hold home oral medications, start correctional insulin coverage   Accu-Cheks reviewed and acceptable  Continue hypoglycemia protocol and carb controlled diet  Mixed hyperlipidemia  Continue statin  HTN, goal below " 130/80  Continue losartan and hydrochlorothiazide  BP reviewed and acceptable  Liver lesion  MRI obtained: No suspicious hepatic lesions.  There is a simple right hepatic lobe cyst.  Mild diffuse hepatic steatosis.  LFTs stable  Outpatient follow up advised  Thyroid nodule  Incidental findings on CT scan  Nonemergent thyroid ultrasound for follow-up in the outpatient setting  Pulmonary nodule  Imaging with 4 mm right lower lobe pulmonary nodule.  CT chest 3-month follow-up  Constipation  Continue senna, MiraLAX and suppository  Ambulate patient as able  Ambulatory dysfunction  PT/OT recommending level 2 at discharge  Awaiting medically stability  Ambulate patient as able  Fall precautions    VTE Pharmacologic Prophylaxis: VTE Score: 5 High Risk (Score >/= 5) - Pharmacological DVT Prophylaxis Ordered: enoxaparin (Lovenox). Sequential Compression Devices Ordered.    Mobility:   Basic Mobility Inpatient Raw Score: 18  JH-HLM Goal: 6: Walk 10 steps or more  JH-HLM Achieved: 8: Walk 250 feet ot more  JH-HLM Goal achieved. Continue to encourage appropriate mobility.    Patient Centered Rounds: I performed bedside rounds with nursing staff today.   Discussions with Specialists or Other Care Team Provider: RN, case management    Education and Discussions with Family / Patient: Updated  (wife) at bedside.    Current Length of Stay: 10 day(s)  Current Patient Status: Inpatient   Certification Statement: The patient will continue to require additional inpatient hospital stay due to pending LP results  Discharge Plan: Anticipate discharge in >72 hrs to rehab facility.    Code Status: Level 1 - Full Code    Subjective   Patient seen and examined, not in acute distress    Objective :  Temp:  [98.1 °F (36.7 °C)-98.3 °F (36.8 °C)] 98.1 °F (36.7 °C)  HR:  [55-70] 65  BP: (120-142)/(70-78) 132/74  Resp:  [18] 18  SpO2:  [96 %-99 %] 96 %  O2 Device: None (Room air)    Body mass index is 27.26 kg/m².     Input and Output  Summary (last 24 hours):     Intake/Output Summary (Last 24 hours) at 4/8/2025 1536  Last data filed at 4/8/2025 0943  Gross per 24 hour   Intake 1131.25 ml   Output --   Net 1131.25 ml       Physical Exam  Vitals and nursing note reviewed.   Constitutional:       General: He is not in acute distress.     Appearance: He is well-developed.   HENT:      Head: Normocephalic and atraumatic.   Cardiovascular:      Rate and Rhythm: Normal rate and regular rhythm.   Pulmonary:      Effort: No respiratory distress.      Breath sounds: Normal breath sounds. No wheezing or rhonchi.   Abdominal:      Palpations: Abdomen is soft.      Tenderness: There is no abdominal tenderness.   Musculoskeletal:         General: No swelling.      Cervical back: Neck supple.   Skin:     General: Skin is warm and dry.   Neurological:      Mental Status: He is alert. He is disoriented.           Lines/Drains:              Lab Results: I have reviewed the following results:   Results from last 7 days   Lab Units 04/08/25  0539   WBC Thousand/uL 7.47   HEMOGLOBIN g/dL 15.4   HEMATOCRIT % 43.3   PLATELETS Thousands/uL 189   SEGS PCT % 54   LYMPHO PCT % 37   MONO PCT % 6   EOS PCT % 2     Results from last 7 days   Lab Units 04/08/25  0539 04/06/25  0501 04/04/25  0908   SODIUM mmol/L 140   < > 141   POTASSIUM mmol/L 4.0   < > 3.6   CHLORIDE mmol/L 105   < > 107   CO2 mmol/L 28   < > 27   BUN mg/dL 16   < > 14   CREATININE mg/dL 0.98   < > 1.02   ANION GAP mmol/L 7   < > 7   CALCIUM mg/dL 9.4   < > 8.5   ALBUMIN g/dL  --   --  3.5   TOTAL BILIRUBIN mg/dL  --   --  0.90   ALK PHOS U/L  --   --  41   ALT U/L  --   --  22   AST U/L  --   --  11*   GLUCOSE RANDOM mg/dL 109   < > 135    < > = values in this interval not displayed.     Results from last 7 days   Lab Units 04/07/25  0922   INR  0.95     Results from last 7 days   Lab Units 04/08/25  1446 04/08/25  1057 04/08/25  0621 04/07/25  2120 04/07/25  1553 04/07/25  1052 04/07/25  0617  04/06/25  2048 04/06/25  1532 04/06/25  1041 04/06/25  0610 04/05/25  2119   POC GLUCOSE mg/dl 158* 166* 95 153* 225* 190* 107 162* 151* 163* 105 122               Recent Cultures (last 7 days):   Results from last 7 days   Lab Units 04/07/25  1300   GRAM STAIN RESULT  Rare Mononuclear Cells  Rare Polys  No No organisms seen             Last 24 Hours Medication List:     Current Facility-Administered Medications:     acetaminophen (TYLENOL) tablet 650 mg, Q6H PRN    aluminum-magnesium hydroxide-simethicone (MAALOX) oral suspension 30 mL, Q4H PRN    [Held by provider] aspirin (ECOTRIN LOW STRENGTH) EC tablet 81 mg, Daily    atorvastatin (LIPITOR) tablet 20 mg, Daily    bisacodyl (DULCOLAX) rectal suppository 10 mg, Daily    Cholecalciferol (VITAMIN D3) tablet 2,000 Units, Daily    cyanocobalamin (VITAMIN B-12) tablet 1,000 mcg, Daily    [Held by provider] enoxaparin (LOVENOX) subcutaneous injection 40 mg, Q24H CAIT    losartan (COZAAR) tablet 100 mg, Daily **AND** hydroCHLOROthiazide tablet 25 mg, Daily    insulin lispro (HumALOG/ADMELOG) 100 units/mL subcutaneous injection 1-5 Units, HS    insulin lispro (HumALOG/ADMELOG) 100 units/mL subcutaneous injection 1-6 Units, TID AC **AND** Fingerstick Glucose (POCT), TID AC    ondansetron (ZOFRAN) injection 4 mg, Q6H PRN    pantoprazole (PROTONIX) EC tablet 20 mg, Early Morning    polyethylene glycol (MIRALAX) packet 17 g, Daily    senna (SENOKOT) tablet 17.2 mg, HS    sodium chloride 0.9 % infusion, Continuous, Last Rate: 75 mL/hr (04/08/25 0943)    Administrative Statements   Today, Patient Was Seen By: Rosa Lockhart MD      **Please Note: This note may have been constructed using a voice recognition system.**

## 2025-04-08 NOTE — PLAN OF CARE
Problem: SAFETY ADULT  Goal: Patient will remain free of falls  Description: INTERVENTIONS:- Educate patient/family on patient safety including physical limitations- Instruct patient to call for assistance with activity - Consult OT/PT to assist with strengthening/mobility - Keep Call bell within reach- Keep bed low and locked with side rails adjusted as appropriate- Keep care items and personal belongings within reach- Initiate and maintain comfort rounds- Make Fall Risk Sign visible to staff- Offer Toileting every 2 Hours, in advance of need- Initiate/Maintain bed/chair alarm- Obtain necessary fall risk management equipment.- Apply yellow socks and bracelet for high fall risk patients- Consider moving patient to room near nurses station  Outcome: Progressing  Goal: Maintain or return to baseline ADL function  Description: INTERVENTIONS:-  Assess patient's ability to carry out ADLs; assess patient's baseline for ADL function and identify physical deficits which impact ability to perform ADLs (bathing, care of mouth/teeth, toileting, grooming, dressing, etc.)- Assess/evaluate cause of self-care deficits - Assess range of motion- Assess patient's mobility; develop plan if impaired- Assess patient's need for assistive devices and provide as appropriate- Encourage maximum independence but intervene and supervise when necessary- Involve family in performance of ADLs- Assess for home care needs following discharge - Consider OT consult to assist with ADL evaluation and planning for discharge- Provide patient education as appropriate  Outcome: Progressing  Goal: Maintains/Returns to pre admission functional level  Description: INTERVENTIONS:- Perform AM-PAC 6 Click Basic Mobility/ Daily Activity assessment daily.- Set and communicate daily mobility goal to care team and patient/family/caregiver. - Collaborate with rehabilitation services on mobility goals if consulted- Reposition patient every 2 hours.- Dangle patient 3  times a day- Stand patient 3 times a day- Ambulate patient 3 times a day- Out of bed to chair 3 times a day - Out of bed for meals 3 times a day- Out of bed for toileting- Record patient progress and toleration of activity level   Outcome: Progressing         Problem: NEUROSENSORY - ADULT  Goal: Achieves stable or improved neurological status  Description: INTERVENTIONS- Monitor and report changes in neurological status- Monitor vital signs such as temperature, blood pressure, glucose, and any other labs ordered - Initiate measures to prevent increased intracranial pressure- Monitor for seizure activity and implement precautions if appropriate    Outcome: Progressing  Goal: Remains free of injury related to seizures activity  Description: INTERVENTIONS- Maintain airway, patient safety  and administer oxygen as ordered- Monitor patient for seizure activity, document and report duration and description of seizure to physician/advanced practitioner- If seizure occurs,  ensure patient safety during seizure- Reorient patient post seizure- Seizure pads on all 4 side rails- Instruct patient/family to notify RN of any seizure activity including if an aura is experienced- Instruct patient/family to call for assistance with activity based on nursing assessment- Administer anti-seizure medications if ordered  Outcome: Progressing  Goal: Achieves maximal functionality and self care  Description: INTERVENTIONS- Monitor swallowing and airway patency with patient fatigue and changes in neurological status- Encourage and assist patient to increase activity and self care. - Encourage visually impaired, hearing impaired and aphasic patients to use assistive/communication devices  Outcome: Progressing        Problem: DISCHARGE PLANNING  Goal: Discharge to home or other facility with appropriate resources  Description: INTERVENTIONS:- Identify barriers to discharge w/patient and caregiver- Arrange for needed discharge resources and  transportation as appropriate- Identify discharge learning needs (meds, wound care, etc.)- Refer to Case Management Department for coordinating discharge planning if the patient needs post-hospital services based on physician/advanced practitioner order or complex needs related to functional status, cognitive ability, or social support system  Outcome: Progressing

## 2025-04-08 NOTE — OCCUPATIONAL THERAPY NOTE
Occupational Therapy Progress Note     Patient Name: Alexis Dennison  Today's Date: 4/8/2025  Problem List  Principal Problem:    Brain tumor (HCC)  Active Problems:    Type 2 diabetes mellitus with hyperglycemia, without long-term current use of insulin (HCC)    HTN, goal below 130/80    Mixed hyperlipidemia    Thyroid nodule    Pulmonary nodule    Liver lesion    Constipation    Ambulatory dysfunction    Encephalopathy     04/08/25 1025   OT Last Visit   OT Visit Date 04/08/25   Note Type   Note Type Treatment   Pain Assessment   Pain Assessment Tool 0-10   Pain Score No Pain   Restrictions/Precautions   Weight Bearing Precautions Per Order No   Other Precautions Cognitive;Chair Alarm;Bed Alarm;Telemetry;Fall Risk  (Cogntive impairrment with session, aphasia/memory deficits)   Lifestyle   Autonomy I with ADL's/iaDL's, no AD with functional mobility +drives   Reciprocal Relationships spouse, children   Service to Others full time employement   Intrinsic Gratification reading   ADL   Eating Assistance 5  Supervision/Setup   Eating Deficit Setup  (lunch foods)   Cognition   Overall Cognitive Status (S)  Impaired   Arousal/Participation Alert;Responsive;Cooperative   Attention Attends with cues to redirect   Orientation Level Oriented to person;Disoriented to place;Disoriented to time;Disoriented to situation   Memory Decreased recall of precautions;Decreased recall of recent events;Decreased short term memory;Decreased recall of biographical information;Decreased long term memory   Following Commands Follows one step commands with increased time or repetition   Comments pt pleasant and cooperative, performed memory tasks with recalling words associated with events (swimming, playing baseline) pt unable to recall and state 0/9 words, attempted finding difference in picture activity, pt unable to locate 0/9 differences, speech present and attempted directional cues, word associated able to state 1/4. pt continues to  required step by step instructions to complete functional tasks. Decreased memory/word finding this session. *pt with significant cogntive deficits requiring 24/7 S/assistance upon discharge if plan is for home vs STR.   Assessment   Assessment Patient participated in Skilled OT session this date with interventions consisting of cognitive activities, self care tasks . Patient agreeable to OT treatment session, upon arrival patient was found seated OOB to Chair.  In comparison to previous session, patient with significant cognitive/speech deficits, limiting self care Dunn. . Patient requiring verbal cues for safety, verbal cues for correct technique, verbal cues for pacing thru activity steps, cognitive assistance to anticipate next step, one step directives, and frequent rest periods. Patient continues to be functioning below baseline level, occupational performance remains limited secondary to factors listed above and increased risk for falls and injury.   From OT standpoint, recommendation at time of d/c would be mod level II.  The patient's raw score on the -PAC Daily Activity Inpatient Short Form is 9. A raw score of less than 19 suggests the patient may benefit from discharge to post-acute rehabilitation services. Please refer to the recommendation of the Occupational Therapist for safe discharge planning.  Patient to benefit from continued Occupational Therapy treatment while in the hospital to address deficits as defined above and maximize level of functional independence with ADLs and functional mobility.   Plan   Treatment Interventions ADL retraining;Functional transfer training;UE strengthening/ROM;Endurance training;Cognitive reorientation;Patient/family training;Compensatory technique education;Continued evaluation;Energy conservation;Activityengagement   Goal Expiration Date 04/15/25   OT Treatment Day 2   OT Frequency 2-3x/wk   Discharge Recommendation   Rehab Resource Intensity Level, OT II  (Moderate Resource Intensity)   AM-PAC Daily Activity Inpatient   Lower Body Dressing 1   Bathing 1   Toileting 1   Upper Body Dressing 1   Grooming 2   Eating 3   Daily Activity Raw Score 9   AM-PAC Applied Cognition Inpatient   Following a Speech/Presentation 1   Understanding Ordinary Conversation 2   Taking Medications 1   Remembering Where Things Are Placed or Put Away 1   Remembering List of 4-5 Errands 1   Taking Care of Complicated Tasks 1   Applied Cognition Raw Score 7   Applied Cognition Standardized Score 15.17   End of Consult   Patient Position at End of Consult Bed/Chair alarm activated;All needs within reach;Bedside chair   Nurse Communication Nurse aware of consult     Yuridia Andrade OTR/L

## 2025-04-08 NOTE — RESTORATIVE TECHNICIAN NOTE
Restorative Technician Note      Patient Name: Alexis Dennison     Note Type: Mobility  Patient Position Upon Consult: Supine  Activity Performed: Ambulated; Dangled; Stood  Assistive Device: Other (Comment) (A x1)  Education Provided: Yes  Patient Position at End of Consult: Bedside chair; All needs within reach; Bed/Chair alarm activated    Cande BANEGAS, Restorative Technician,

## 2025-04-08 NOTE — SPEECH THERAPY NOTE
"Speech Language/Pathology    Speech/Language Pathology Progress Note    Patient Name: Alexis Dennison  Today's Date: 4/8/2025     Language tx [id number]  Dysphagia eval [id number]    Subjective:  \"I don't know\" Patient awake and alert    Objective:  The patient is seen for co-treatment with OT. He is sitting upright at bedside table. He is easily distracted and has difficulty maintaining attention to task. The patient participates in some structured activities including item description, following commands and automatic phrases. He has difficulty reading directions. The patient is not able to follow written or verbal 1 step directions. He needs max verbal cues to give 2 of 3 items description words and benefits from phonemic cue and gesturing. The patient is only able to complete 1 automatic task today (he was able to complete more yesterday in therapy). The patient participates in some basic conversation, but needs extra time to process and respond. Some speech is nonsensical and word salad. The patient has difficulty maintaining eye contact. Appears to have depressed affect. Unsure of patient's insight at this time. Does appear to present with some expressive aphasia today.    Assessment:  Patient continues with difficulty completing structured tasks.    Plan/Recommendations:  Continue ST towards goals.     Speech-Language Pathology Bedside Swallow Evaluation    Summary   Pt presented with functional appearing oral and pharyngeal stage swallowing skills with materials administered today. He is assessed with regular solids and thin liquids. The patient tolerates all well, but has difficulty with motor planning to feed himself. He needs constant cues throughout and keeps asking \"what's next?\"     Risk/s for Aspiration: low     Recommended Diet: regular diet and thin liquids   Recommended Form of Meds:  however patient tolerates secondary to cognitive status    Aspiration precautions and swallowing strategies: supervise " and give cues as needed   Other Recommendations: Continue frequent oral care    Current Precautions:  Fall  Aspiration  Delirium  Allergies:  No known food allergies  Past medical history:  Please see H&P for details    Special Studies:  CT head 4/3/25:   Redemonstrated hyperattenuating subependymal masses better characterized on recent MR. Interval increased ventricular caliber, left greater than right in comparison to recent CT dated 3/29/2025 concerning for worsening hydrocephalus. Recommend   neurosurgical consultation.    Social/Education/Vocational Hx:  Pt lives with family    Swallow Information   Current Risks for Dysphagia & Aspiration:  cognitive impairment secondary to diagnosis  Current Symptoms/Concerns:  none  Current Diet: regular diet and thin liquids   Baseline Diet: regular diet and thin liquids    Baseline Assessment   Behavior/Cognition: alert  Speech/Language Status: able to participate in basic conversation  Patient Positioning: upright in chair  Pain Status/Interventions/Response to Interventions: No report of or nonverbal indications of pain.     Swallow Mechanism Exam  Not formally assessed, but WFL    Consistencies Assessed and Performance   Consistencies Administered: thin liquids and hard solids  Materials administered included pulled pork and potatoes with thin liquids     Oral Stage: WFL  Mastication was adequate with the materials administered today.  Bolus formation and transfer were functional with no significant oral residue noted.  No overt s/s reduced oral control.    Pharyngeal Stage: WFL  Swallow Mechanics:  Swallowing initiation appeared prompt.  Laryngeal rise was observed and judged to be within functional limits.  No coughing, throat clearing, change in vocal quality or respiratory status noted today.     Esophageal Concerns: none reported    Summary and Recommendations (see above)    Treatment Recommended: no dysphagia therapy warranted. Continue with language/cognitive  therapy

## 2025-04-09 LAB
ANION GAP SERPL CALCULATED.3IONS-SCNC: 9 MMOL/L (ref 4–13)
BASOPHILS # BLD AUTO: 0.04 THOUSANDS/ÂΜL (ref 0–0.1)
BASOPHILS NFR BLD AUTO: 1 % (ref 0–1)
BUN SERPL-MCNC: 16 MG/DL (ref 5–25)
CALCIUM SERPL-MCNC: 9.6 MG/DL (ref 8.4–10.2)
CHLORIDE SERPL-SCNC: 104 MMOL/L (ref 96–108)
CO2 SERPL-SCNC: 30 MMOL/L (ref 21–32)
CREAT SERPL-MCNC: 0.98 MG/DL (ref 0.6–1.3)
EOSINOPHIL # BLD AUTO: 0.14 THOUSAND/ÂΜL (ref 0–0.61)
EOSINOPHIL NFR BLD AUTO: 2 % (ref 0–6)
ERYTHROCYTE [DISTWIDTH] IN BLOOD BY AUTOMATED COUNT: 11.9 % (ref 11.6–15.1)
GFR SERPL CREATININE-BSD FRML MDRD: 80 ML/MIN/1.73SQ M
GLUCOSE SERPL-MCNC: 115 MG/DL (ref 65–140)
GLUCOSE SERPL-MCNC: 122 MG/DL (ref 65–140)
GLUCOSE SERPL-MCNC: 138 MG/DL (ref 65–140)
GLUCOSE SERPL-MCNC: 151 MG/DL (ref 65–140)
GLUCOSE SERPL-MCNC: 201 MG/DL (ref 65–140)
HCT VFR BLD AUTO: 43.9 % (ref 36.5–49.3)
HGB BLD-MCNC: 15.3 G/DL (ref 12–17)
IMM GRANULOCYTES # BLD AUTO: 0.02 THOUSAND/UL (ref 0–0.2)
IMM GRANULOCYTES NFR BLD AUTO: 0 % (ref 0–2)
LYMPHOCYTES # BLD AUTO: 2.43 THOUSANDS/ÂΜL (ref 0.6–4.47)
LYMPHOCYTES NFR BLD AUTO: 34 % (ref 14–44)
MCH RBC QN AUTO: 31.2 PG (ref 26.8–34.3)
MCHC RBC AUTO-ENTMCNC: 34.9 G/DL (ref 31.4–37.4)
MCV RBC AUTO: 89 FL (ref 82–98)
MONOCYTES # BLD AUTO: 0.5 THOUSAND/ÂΜL (ref 0.17–1.22)
MONOCYTES NFR BLD AUTO: 7 % (ref 4–12)
NEUTROPHILS # BLD AUTO: 3.98 THOUSANDS/ÂΜL (ref 1.85–7.62)
NEUTS SEG NFR BLD AUTO: 56 % (ref 43–75)
NRBC BLD AUTO-RTO: 0 /100 WBCS
PLATELET # BLD AUTO: 191 THOUSANDS/UL (ref 149–390)
PMV BLD AUTO: 10.6 FL (ref 8.9–12.7)
POTASSIUM SERPL-SCNC: 4.2 MMOL/L (ref 3.5–5.3)
RBC # BLD AUTO: 4.91 MILLION/UL (ref 3.88–5.62)
SCAN RESULT: NORMAL
SODIUM SERPL-SCNC: 143 MMOL/L (ref 135–147)
WBC # BLD AUTO: 7.11 THOUSAND/UL (ref 4.31–10.16)

## 2025-04-09 PROCEDURE — 85025 COMPLETE CBC W/AUTO DIFF WBC: CPT | Performed by: FAMILY MEDICINE

## 2025-04-09 PROCEDURE — 99232 SBSQ HOSP IP/OBS MODERATE 35: CPT | Performed by: FAMILY MEDICINE

## 2025-04-09 PROCEDURE — 92507 TX SP LANG VOICE COMM INDIV: CPT

## 2025-04-09 PROCEDURE — 80048 BASIC METABOLIC PNL TOTAL CA: CPT | Performed by: FAMILY MEDICINE

## 2025-04-09 PROCEDURE — 82948 REAGENT STRIP/BLOOD GLUCOSE: CPT

## 2025-04-09 RX ADMIN — CYANOCOBALAMIN TAB 500 MCG 1000 MCG: 500 TAB at 09:51

## 2025-04-09 RX ADMIN — PANTOPRAZOLE SODIUM 20 MG: 20 TABLET, DELAYED RELEASE ORAL at 05:27

## 2025-04-09 RX ADMIN — ATORVASTATIN CALCIUM 20 MG: 20 TABLET, FILM COATED ORAL at 09:51

## 2025-04-09 RX ADMIN — INSULIN LISPRO 1 UNITS: 100 INJECTION, SOLUTION INTRAVENOUS; SUBCUTANEOUS at 16:46

## 2025-04-09 RX ADMIN — Medication 2000 UNITS: at 09:51

## 2025-04-09 RX ADMIN — SENNOSIDES 17.2 MG: 8.6 TABLET, FILM COATED ORAL at 22:32

## 2025-04-09 RX ADMIN — ENOXAPARIN SODIUM 40 MG: 40 INJECTION SUBCUTANEOUS at 09:51

## 2025-04-09 NOTE — PLAN OF CARE
Problem: PAIN - ADULT  Goal: Verbalizes/displays adequate comfort level or baseline comfort level  Description: Interventions:- Encourage patient to monitor pain and request assistance- Assess pain using appropriate pain scale- Administer analgesics based on type and severity of pain and evaluate response- Implement non-pharmacological measures as appropriate and evaluate response- Notify physician/advanced practitioner if interventions unsuccessful or patient reports new pain  Outcome: Progressing     Problem: INFECTION - ADULT  Goal: Absence or prevention of progression during hospitalization  Description: INTERVENTIONS:- Assess and monitor for signs and symptoms of infection- Monitor lab/diagnostic results- Monitor all insertion sites, i.e. indwelling lines, tubes, and drains- [place] appropriate cooling/warming therapies per order- Administer medications as ordered- Instruct and encourage patient and family to use good hand hygiene technique- Identify and instruct in appropriate isolation precautions for identified infection/condition  Outcome: Progressing     Problem: SAFETY ADULT  Goal: Patient will remain free of falls  Description: INTERVENTIONS:- Educate patient/family on patient safety including physical limitations- Instruct patient to call for assistance with activity - Consult OT/PT to assist with strengthening/mobility - Keep Call bell within reach- Keep bed low and locked with side rails adjusted as appropriate- Keep care items and personal belongings within reach- Initiate and maintain comfort rounds- Make Fall Risk Sign visible to staff- Offer Toileting every 2 Hours, in advance of need- Initiate/Maintain bed/chair alarm- Obtain necessary fall risk management equipment.- Apply yellow socks and bracelet for high fall risk patients- Consider moving patient to room near nurses station  Outcome: Progressing  Goal: Maintain or return to baseline ADL function  Description: INTERVENTIONS:-  Assess  patient's ability to carry out ADLs; assess patient's baseline for ADL function and identify physical deficits which impact ability to perform ADLs (bathing, care of mouth/teeth, toileting, grooming, dressing, etc.)- Assess/evaluate cause of self-care deficits - Assess range of motion- Assess patient's mobility; develop plan if impaired- Assess patient's need for assistive devices and provide as appropriate- Encourage maximum independence but intervene and supervise when necessary- Involve family in performance of ADLs- Assess for home care needs following discharge - Consider OT consult to assist with ADL evaluation and planning for discharge- Provide patient education as appropriate  Outcome: Progressing  Goal: Maintains/Returns to pre admission functional level  Description: INTERVENTIONS:- Perform AM-PAC 6 Click Basic Mobility/ Daily Activity assessment daily.- Set and communicate daily mobility goal to care team and patient/family/caregiver. - Collaborate with rehabilitation services on mobility goals if consulted- Reposition patient every 2 hours.- Dangle patient 3 times a day- Stand patient 3 times a day- Ambulate patient 3 times a day- Out of bed to chair 3 times a day - Out of bed for meals 3 times a day- Out of bed for toileting- Record patient progress and toleration of activity level   Outcome: Progressing     Problem: DISCHARGE PLANNING  Goal: Discharge to home or other facility with appropriate resources  Description: INTERVENTIONS:- Identify barriers to discharge w/patient and caregiver- Arrange for needed discharge resources and transportation as appropriate- Identify discharge learning needs (meds, wound care, etc.)- Refer to Case Management Department for coordinating discharge planning if the patient needs post-hospital services based on physician/advanced practitioner order or complex needs related to functional status, cognitive ability, or social support system  Outcome: Progressing      Problem: Knowledge Deficit  Goal: Patient/family/caregiver demonstrates understanding of disease process, treatment plan, medications, and discharge instructions  Description: Complete learning assessment and assess knowledge base.Interventions:- Provide teaching at level of understanding- Provide teaching via preferred learning methods  Outcome: Progressing     Problem: NEUROSENSORY - ADULT  Goal: Achieves stable or improved neurological status  Description: INTERVENTIONS- Monitor and report changes in neurological status- Monitor vital signs such as temperature, blood pressure, glucose, and any other labs ordered - Initiate measures to prevent increased intracranial pressure- Monitor for seizure activity and implement precautions if appropriate    Outcome: Progressing  Goal: Remains free of injury related to seizures activity  Description: INTERVENTIONS- Maintain airway, patient safety  and administer oxygen as ordered- Monitor patient for seizure activity, document and report duration and description of seizure to physician/advanced practitioner- If seizure occurs,  ensure patient safety during seizure- Reorient patient post seizure- Seizure pads on all 4 side rails- Instruct patient/family to notify RN of any seizure activity including if an aura is experienced- Instruct patient/family to call for assistance with activity based on nursing assessment- Administer anti-seizure medications if ordered  Outcome: Progressing  Goal: Achieves maximal functionality and self care  Description: INTERVENTIONS- Monitor swallowing and airway patency with patient fatigue and changes in neurological status- Encourage and assist patient to increase activity and self care. - Encourage visually impaired, hearing impaired and aphasic patients to use assistive/communication devices  Outcome: Progressing     Problem: METABOLIC, FLUID AND ELECTROLYTES - ADULT  Goal: Glucose maintained within target range  Description: INTERVENTIONS:-  Monitor Blood Glucose as ordered- Assess for signs and symptoms of hyperglycemia and hypoglycemia- Administer ordered medications to maintain glucose within target range- Assess nutritional intake and initiate nutrition service referral as needed  Outcome: Progressing     Problem: Prexisting or High Potential for Compromised Skin Integrity  Goal: Skin integrity is maintained or improved  Description: INTERVENTIONS:- Identify patients at risk for skin breakdown- Assess and monitor skin integrity- Assess and monitor nutrition and hydration status- Monitor labs - Assess for incontinence - Turn and reposition patient- Assist with mobility/ambulation- Relieve pressure over bony prominences- Avoid friction and shearing- Provide appropriate hygiene as needed including keeping skin clean and dry- Evaluate need for skin moisturizer/barrier cream- Collaborate with interdisciplinary team - Patient/family teaching- Consider wound care consult   Outcome: Progressing

## 2025-04-09 NOTE — CASE MANAGEMENT
Case Management Discharge Planning Note    Patient name Alexis Dennison  Location [facility] 705/[facility] 705-01 MRN [id number]  : 1960 Date 2025       Current Admission Date: 3/29/2025  Current Admission Diagnosis:Brain tumor (HCC)   Patient Active Problem List    Diagnosis Date Noted Date Diagnosed    Encephalopathy 2025     Constipation 2025     Ambulatory dysfunction 2025     Thyroid nodule 2025     Pulmonary nodule 2025     Liver lesion 2025     Brain tumor (HCC) 2025     Type 2 diabetes mellitus with hyperglycemia, without long-term current use of insulin (HCC) 2022     HTN, goal below 130/80 2022     Mixed hyperlipidemia 2022     Vitamin D deficiency 2022     NAFLD (nonalcoholic fatty liver disease) 2022     Fatigue 2022       LOS (days): 11  Geometric Mean LOS (GMLOS) (days): 6.7  Days to GMLOS:-4     OBJECTIVE:  Risk of Unplanned Readmission Score: 11.78         Current admission status: Inpatient   Preferred Pharmacy:   [facility]/pharmacy #[id number] - [place], [place] - [address] [address]  [address] [address]  Phone: [phone] Fax: [phone]    [facility] - [place], [place] - [address]  [address]  [facility] [address]  Phone: [phone] Fax: [phone]    Primary Care Provider: PATI Mckeon    Primary Insurance: BLUE CROSS  Secondary Insurance: CAPITAL    DISCHARGE DETAILS:    Discharge planning discussed with:: Patient's daughterElizabeth           Additional Comments: CM received phone call this AM from patient's daughterElizabeth. She is concerned about the decline of her father's cognitive ability- would like to talk to a physician regarding his plan of treatment.  Attending provider and oncologist made aware.                       1

## 2025-04-09 NOTE — PROGRESS NOTES
"Progress Note - Hospitalist   Name: Alexis Dennison 64 y.o. male I MRN: [id number]  Unit/Bed#: [facility] 705-01 I Date of Admission: 3/29/2025   Date of Service: 4/9/2025 I Hospital Day: 11    Assessment & Plan  Brain tumor (HCC)  Patient with development of worsening confusion, recently seen at the [facility] ED 3/24/2025 with CT head at that time with finding of brain lesion for which MRI was advised to exclude cancerous etiology.     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  Lymphoma/leukemia panel nondiagnostic  CT head on 4/3 with interval increase in ventricular caliber concerning for worsening hydrocephalus  No indication for AED per neurosurgery  Continue neurochecks   Neurosurgery and oncology recommending repeat high-volume LP as previous was nondiagnostic  Post LP 4/7, results pending  Encephalopathy  Due to brain mass as above  Patient with acute onset confusion, forgetting names/places, oriented to self only most of the times  Delirium precautions  CT on 4/3 with findings concerning of worsening hydrocephalus  Type 2 diabetes mellitus with hyperglycemia, without long-term current use of insulin (HCC)  Has been well-controlled as outpatient with hemoglobin A1c of 6.6%  Hold home oral medications, start correctional insulin coverage   Accu-Cheks reviewed and acceptable  Continue hypoglycemia protocol and carb controlled diet  Mixed hyperlipidemia  Continue statin  HTN, goal below " 130/80  Continue losartan and hydrochlorothiazide  BP reviewed and acceptable  Liver lesion  MRI obtained: No suspicious hepatic lesions.  There is a simple right hepatic lobe cyst.  Mild diffuse hepatic steatosis.  LFTs stable  Outpatient follow up advised  Thyroid nodule  Incidental findings on CT scan  Nonemergent thyroid ultrasound for follow-up in the outpatient setting  Pulmonary nodule  Imaging with 4 mm right lower lobe pulmonary nodule.  CT chest 3-month follow-up  Constipation  Continue senna, MiraLAX and suppository  Ambulate patient as able  Ambulatory dysfunction  PT/OT recommending level 2 at discharge  Awaiting medically stability  Ambulate patient as able  Fall precautions    VTE Pharmacologic Prophylaxis: VTE Score: 5 High Risk (Score >/= 5) - Pharmacological DVT Prophylaxis Ordered: enoxaparin (Lovenox). Sequential Compression Devices Ordered.    Mobility:   Basic Mobility Inpatient Raw Score: 18  JH-HLM Goal: 6: Walk 10 steps or more  JH-HLM Achieved: 8: Walk 250 feet ot more  JH-HLM Goal achieved. Continue to encourage appropriate mobility.    Patient Centered Rounds: I performed bedside rounds with nursing staff today.   Discussions with Specialists or Other Care Team Provider: RN, case management    Education and Discussions with Family / Patient: Updated  (wife) at bedside and daughter Elizabeth via phone.    Current Length of Stay: 11 day(s)  Current Patient Status: Inpatient   Certification Statement: The patient will continue to require additional inpatient hospital stay due to pending LP results  Discharge Plan: Anticipate discharge in >72 hrs to rehab facility.    Code Status: Level 1 - Full Code    Subjective   Patient seen and examined, not in acute distress    Objective :  Temp:  [98.4 °F (36.9 °C)-100 °F (37.8 °C)] 98.4 °F (36.9 °C)  HR:  [56-67] 66  BP: (111-121)/(68-70) 111/68  Resp:  [18] 18  SpO2:  [94 %-98 %] 94 %  O2 Device: None (Room air)    Body mass index is 27.26  kg/m².     Input and Output Summary (last 24 hours):     Intake/Output Summary (Last 24 hours) at 4/9/2025 1714  Last data filed at 4/9/2025 1458  Gross per 24 hour   Intake --   Output 151 ml   Net -151 ml       Physical Exam  Vitals and nursing note reviewed.   Constitutional:       General: He is not in acute distress.     Appearance: He is well-developed.   HENT:      Head: Normocephalic and atraumatic.   Cardiovascular:      Rate and Rhythm: Normal rate and regular rhythm.   Pulmonary:      Effort: No respiratory distress.      Breath sounds: Normal breath sounds. No wheezing or rhonchi.   Abdominal:      Palpations: Abdomen is soft.      Tenderness: There is no abdominal tenderness.   Musculoskeletal:         General: No swelling.      Cervical back: Neck supple.   Skin:     General: Skin is warm and dry.   Neurological:      Mental Status: He is alert. He is disoriented.           Lines/Drains:              Lab Results: I have reviewed the following results:   Results from last 7 days   Lab Units 04/09/25  0458   WBC Thousand/uL 7.11   HEMOGLOBIN g/dL 15.3   HEMATOCRIT % 43.9   PLATELETS Thousands/uL 191   SEGS PCT % 56   LYMPHO PCT % 34   MONO PCT % 7   EOS PCT % 2     Results from last 7 days   Lab Units 04/09/25  0458 04/06/25  0501 04/04/25  0908   SODIUM mmol/L 143   < > 141   POTASSIUM mmol/L 4.2   < > 3.6   CHLORIDE mmol/L 104   < > 107   CO2 mmol/L 30   < > 27   BUN mg/dL 16   < > 14   CREATININE mg/dL 0.98   < > 1.02   ANION GAP mmol/L 9   < > 7   CALCIUM mg/dL 9.6   < > 8.5   ALBUMIN g/dL  --   --  3.5   TOTAL BILIRUBIN mg/dL  --   --  0.90   ALK PHOS U/L  --   --  41   ALT U/L  --   --  22   AST U/L  --   --  11*   GLUCOSE RANDOM mg/dL 115   < > 135    < > = values in this interval not displayed.     Results from last 7 days   Lab Units 04/07/25  0922   INR  0.95     Results from last 7 days   Lab Units 04/09/25  1619 04/09/25  1151 04/09/25  0612 04/08/25  2138 04/08/25  1446 04/08/25  1057  04/08/25  0621 04/07/25  2120 04/07/25  1553 04/07/25  1052 04/07/25  0617 04/06/25  2048   POC GLUCOSE mg/dl 151* 201* 122 151* 158* 166* 95 153* 225* 190* 107 162*               Recent Cultures (last 7 days):   Results from last 7 days   Lab Units 04/07/25  1300   GRAM STAIN RESULT  Rare Mononuclear Cells  Rare Polys  No No organisms seen             Last 24 Hours Medication List:     Current Facility-Administered Medications:     acetaminophen (TYLENOL) tablet 650 mg, Q6H PRN    aluminum-magnesium hydroxide-simethicone (MAALOX) oral suspension 30 mL, Q4H PRN    [Held by provider] aspirin (ECOTRIN LOW STRENGTH) EC tablet 81 mg, Daily    atorvastatin (LIPITOR) tablet 20 mg, Daily    bisacodyl (DULCOLAX) rectal suppository 10 mg, Daily    Cholecalciferol (VITAMIN D3) tablet 2,000 Units, Daily    cyanocobalamin (VITAMIN B-12) tablet 1,000 mcg, Daily    enoxaparin (LOVENOX) subcutaneous injection 40 mg, Q24H CAIT    losartan (COZAAR) tablet 100 mg, Daily **AND** hydroCHLOROthiazide tablet 25 mg, Daily    insulin lispro (HumALOG/ADMELOG) 100 units/mL subcutaneous injection 1-5 Units, HS    insulin lispro (HumALOG/ADMELOG) 100 units/mL subcutaneous injection 1-6 Units, TID AC **AND** Fingerstick Glucose (POCT), TID AC    ondansetron (ZOFRAN) injection 4 mg, Q6H PRN    pantoprazole (PROTONIX) EC tablet 20 mg, Early Morning    polyethylene glycol (MIRALAX) packet 17 g, Daily    senna (SENOKOT) tablet 17.2 mg, HS    Administrative Statements   Today, Patient Was Seen By: Rosa Lockhart MD      **Please Note: This note may have been constructed using a voice recognition system.**

## 2025-04-09 NOTE — SPEECH THERAPY NOTE
"Speech Language/Pathology    Speech/Language Pathology Progress Note    Patient Name: Alexis Dennison  Today's Date: 4/9/2025     Problem List  Principal Problem:    Brain tumor (HCC)  Active Problems:    Type 2 diabetes mellitus with hyperglycemia, without long-term current use of insulin (HCC)    HTN, goal below 130/80    Mixed hyperlipidemia    Thyroid nodule    Pulmonary nodule    Liver lesion    Constipation    Ambulatory dysfunction    Encephalopathy       Past Medical History  Past Medical History:   Diagnosis Date    Colon polyp     Diabetes mellitus (HCC)     GERD (gastroesophageal reflux disease)     Hyperlipidemia     Hypertension     Liver disease     Sleep apnea         Past Surgical History  Past Surgical History:   Procedure Laterality Date    COLONOSCOPY      CYST REMOVAL      back    FL LUMBAR PUNCTURE DIAGNOSTIC  3/31/2025    FL LUMBAR PUNCTURE DIAGNOSTIC  4/7/2025    IL EXC B9 LESION MRGN XCP SK TG T/A/L 0.6-1.0 CM N/A 6/7/2023    Procedure: EXCISION  BIOPSY LESION/MASS ABDOMINAL/CHEST WALL;  Surgeon: Trevor Villavicencio MD;  Location:  MAIN OR;  Service: General         Subjective:  Patient awake and alert.     Objective:  The patient is cooperative, and agreeable to speech therapy. TV is turned off to minimize distractions. Started by discussing personal information with patient. He is able to state his name and birth month (says [birth year] instead of [birth year]). He does not recall age and tells SLP he is 33. The patient continues to be distracted, but appears improved from previous session with better eye contact. However, speech is unclear-mostly word salad today. He is unable to complete automatic phrase completions. Patient's daughter calls during session and provides personal information to SLP to go over in sessions. Patient is looking at his phone afterwards and tells SLP he is \"doing his resume\". He is able to name picture x1, but is distracted by trying to flip through papers. No additional structured " activities targeted today. The patient is having more difficulty with overall language at this time. Daughter is aware and is waiting and hoping for results so that treatment can begin.     Assessment:  The patient continues with cognitive and language difficulty.     Plan/Recommendations:  Continue therapy towards goals.

## 2025-04-09 NOTE — ASSESSMENT & PLAN NOTE
"Patient with development of worsening confusion, recently seen at the [facility] ED 3/24/2025 with CT head at that time with finding of brain lesion for which MRI was advised to exclude cancerous etiology.     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  Lymphoma/leukemia panel nondiagnostic  CT head on 4/3 with interval increase in ventricular caliber concerning for worsening hydrocephalus  No indication for AED per neurosurgery  Continue neurochecks   Neurosurgery and oncology recommending repeat high-volume LP as previous was nondiagnostic  Post LP 4/7, results pending  "

## 2025-04-10 LAB
ABO GROUP BLD: NORMAL
ABO GROUP BLD: NORMAL
ANA ELISA RESULT: NORMAL RATIO
ANTINUCLEAR ANTIBODY BY ELISA: NORMAL
BACTERIA CSF CULT: NO GROWTH
BLD GP AB SCN SERPL QL: NEGATIVE
GLUCOSE SERPL-MCNC: 110 MG/DL (ref 65–140)
GLUCOSE SERPL-MCNC: 129 MG/DL (ref 65–140)
GLUCOSE SERPL-MCNC: 159 MG/DL (ref 65–140)
GLUCOSE SERPL-MCNC: 202 MG/DL (ref 65–140)
Lab: NORMAL
RATIO: 0.1
RH BLD: POSITIVE
RH BLD: POSITIVE
SPECIMEN EXPIRATION DATE: NORMAL

## 2025-04-10 PROCEDURE — 99232 SBSQ HOSP IP/OBS MODERATE 35: CPT | Performed by: FAMILY MEDICINE

## 2025-04-10 PROCEDURE — 86901 BLOOD TYPING SEROLOGIC RH(D): CPT | Performed by: NEUROLOGICAL SURGERY

## 2025-04-10 PROCEDURE — 86900 BLOOD TYPING SEROLOGIC ABO: CPT | Performed by: NEUROLOGICAL SURGERY

## 2025-04-10 PROCEDURE — 88108 CYTOPATH CONCENTRATE TECH: CPT | Performed by: PATHOLOGY

## 2025-04-10 PROCEDURE — 99233 SBSQ HOSP IP/OBS HIGH 50: CPT | Performed by: INTERNAL MEDICINE

## 2025-04-10 PROCEDURE — 82948 REAGENT STRIP/BLOOD GLUCOSE: CPT

## 2025-04-10 PROCEDURE — 86850 RBC ANTIBODY SCREEN: CPT | Performed by: NEUROLOGICAL SURGERY

## 2025-04-10 RX ORDER — HEPARIN SODIUM 5000 [USP'U]/ML
5000 INJECTION, SOLUTION INTRAVENOUS; SUBCUTANEOUS EVERY 12 HOURS SCHEDULED
Status: DISCONTINUED | OUTPATIENT
Start: 2025-04-10 | End: 2025-04-13

## 2025-04-10 RX ADMIN — HEPARIN SODIUM 5000 UNITS: 5000 INJECTION INTRAVENOUS; SUBCUTANEOUS at 14:13

## 2025-04-10 RX ADMIN — POLYETHYLENE GLYCOL 3350 17 G: 17 POWDER, FOR SOLUTION ORAL at 08:51

## 2025-04-10 RX ADMIN — ATORVASTATIN CALCIUM 20 MG: 20 TABLET, FILM COATED ORAL at 08:50

## 2025-04-10 RX ADMIN — INSULIN LISPRO 2 UNITS: 100 INJECTION, SOLUTION INTRAVENOUS; SUBCUTANEOUS at 11:47

## 2025-04-10 RX ADMIN — SENNOSIDES 17.2 MG: 8.6 TABLET, FILM COATED ORAL at 22:09

## 2025-04-10 RX ADMIN — Medication 2000 UNITS: at 08:50

## 2025-04-10 RX ADMIN — PANTOPRAZOLE SODIUM 20 MG: 20 TABLET, DELAYED RELEASE ORAL at 05:43

## 2025-04-10 RX ADMIN — CYANOCOBALAMIN TAB 500 MCG 1000 MCG: 500 TAB at 08:50

## 2025-04-10 RX ADMIN — INSULIN LISPRO 1 UNITS: 100 INJECTION, SOLUTION INTRAVENOUS; SUBCUTANEOUS at 22:09

## 2025-04-10 RX ADMIN — HEPARIN SODIUM 5000 UNITS: 5000 INJECTION INTRAVENOUS; SUBCUTANEOUS at 22:09

## 2025-04-10 RX ADMIN — ENOXAPARIN SODIUM 40 MG: 40 INJECTION SUBCUTANEOUS at 08:51

## 2025-04-10 NOTE — PLAN OF CARE
Problem: PAIN - ADULT  Goal: Verbalizes/displays adequate comfort level or baseline comfort level  Description: Interventions:- Encourage patient to monitor pain and request assistance- Assess pain using appropriate pain scale- Administer analgesics based on type and severity of pain and evaluate response- Implement non-pharmacological measures as appropriate and evaluate response- Notify physician/advanced practitioner if interventions unsuccessful or patient reports new pain  Outcome: Progressing     Problem: INFECTION - ADULT  Goal: Absence or prevention of progression during hospitalization  Description: INTERVENTIONS:- Assess and monitor for signs and symptoms of infection- Monitor lab/diagnostic results- Monitor all insertion sites, i.e. indwelling lines, tubes, and drains- Marion appropriate cooling/warming therapies per order- Administer medications as ordered- Instruct and encourage patient and family to use good hand hygiene technique- Identify and instruct in appropriate isolation precautions for identified infection/condition  Outcome: Progressing     Problem: SAFETY ADULT  Goal: Patient will remain free of falls  Description: INTERVENTIONS:- Educate patient/family on patient safety including physical limitations- Instruct patient to call for assistance with activity - Consult OT/PT to assist with strengthening/mobility - Keep Call bell within reach- Keep bed low and locked with side rails adjusted as appropriate- Keep care items and personal belongings within reach- Initiate and maintain comfort rounds- Make Fall Risk Sign visible to staff- Offer Toileting every 2 Hours, in advance of need- Initiate/Maintain bed/chair alarm- Obtain necessary fall risk management equipment.- Apply yellow socks and bracelet for high fall risk patients- Consider moving patient to room near nurses station  Outcome: Progressing  Goal: Maintain or return to baseline ADL function  Description: INTERVENTIONS:-  Assess  patient's ability to carry out ADLs; assess patient's baseline for ADL function and identify physical deficits which impact ability to perform ADLs (bathing, care of mouth/teeth, toileting, grooming, dressing, etc.)- Assess/evaluate cause of self-care deficits - Assess range of motion- Assess patient's mobility; develop plan if impaired- Assess patient's need for assistive devices and provide as appropriate- Encourage maximum independence but intervene and supervise when necessary- Involve family in performance of ADLs- Assess for home care needs following discharge - Consider OT consult to assist with ADL evaluation and planning for discharge- Provide patient education as appropriate  Outcome: Progressing  Goal: Maintains/Returns to pre admission functional level  Description: INTERVENTIONS:- Perform AM-PAC 6 Click Basic Mobility/ Daily Activity assessment daily.- Set and communicate daily mobility goal to care team and patient/family/caregiver. - Collaborate with rehabilitation services on mobility goals if consulted- Reposition patient every 2 hours.- Dangle patient 3 times a day- Stand patient 3 times a day- Ambulate patient 3 times a day- Out of bed to chair 3 times a day - Out of bed for meals 3 times a day- Out of bed for toileting- Record patient progress and toleration of activity level   Outcome: Progressing     Problem: DISCHARGE PLANNING  Goal: Discharge to home or other facility with appropriate resources  Description: INTERVENTIONS:- Identify barriers to discharge w/patient and caregiver- Arrange for needed discharge resources and transportation as appropriate- Identify discharge learning needs (meds, wound care, etc.)- Refer to Case Management Department for coordinating discharge planning if the patient needs post-hospital services based on physician/advanced practitioner order or complex needs related to functional status, cognitive ability, or social support system  Outcome: Progressing      Problem: Knowledge Deficit  Goal: Patient/family/caregiver demonstrates understanding of disease process, treatment plan, medications, and discharge instructions  Description: Complete learning assessment and assess knowledge base.Interventions:- Provide teaching at level of understanding- Provide teaching via preferred learning methods  Outcome: Progressing     Problem: NEUROSENSORY - ADULT  Goal: Achieves stable or improved neurological status  Description: INTERVENTIONS- Monitor and report changes in neurological status- Monitor vital signs such as temperature, blood pressure, glucose, and any other labs ordered - Initiate measures to prevent increased intracranial pressure- Monitor for seizure activity and implement precautions if appropriate    Outcome: Progressing  Goal: Remains free of injury related to seizures activity  Description: INTERVENTIONS- Maintain airway, patient safety  and administer oxygen as ordered- Monitor patient for seizure activity, document and report duration and description of seizure to physician/advanced practitioner- If seizure occurs,  ensure patient safety during seizure- Reorient patient post seizure- Seizure pads on all 4 side rails- Instruct patient/family to notify RN of any seizure activity including if an aura is experienced- Instruct patient/family to call for assistance with activity based on nursing assessment- Administer anti-seizure medications if ordered  Outcome: Progressing  Goal: Achieves maximal functionality and self care  Description: INTERVENTIONS- Monitor swallowing and airway patency with patient fatigue and changes in neurological status- Encourage and assist patient to increase activity and self care. - Encourage visually impaired, hearing impaired and aphasic patients to use assistive/communication devices  Outcome: Progressing     Problem: METABOLIC, FLUID AND ELECTROLYTES - ADULT  Goal: Glucose maintained within target range  Description: INTERVENTIONS:-  Monitor Blood Glucose as ordered- Assess for signs and symptoms of hyperglycemia and hypoglycemia- Administer ordered medications to maintain glucose within target range- Assess nutritional intake and initiate nutrition service referral as needed  Outcome: Progressing     Problem: Prexisting or High Potential for Compromised Skin Integrity  Goal: Skin integrity is maintained or improved  Description: INTERVENTIONS:- Identify patients at risk for skin breakdown- Assess and monitor skin integrity- Assess and monitor nutrition and hydration status- Monitor labs - Assess for incontinence - Turn and reposition patient- Assist with mobility/ambulation- Relieve pressure over bony prominences- Avoid friction and shearing- Provide appropriate hygiene as needed including keeping skin clean and dry- Evaluate need for skin moisturizer/barrier cream- Collaborate with interdisciplinary team - Patient/family teaching- Consider wound care consult   Outcome: Progressing

## 2025-04-10 NOTE — ASSESSMENT & PLAN NOTE
"Patient with development of worsening confusion, recently seen at the [facility] ED 3/24/2025 with CT head at that time with finding of brain lesion for which MRI was advised to exclude cancerous etiology.     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  Lymphoma/leukemia panel nondiagnostic  CT head on 4/3 with interval increase in ventricular caliber concerning for worsening hydrocephalus  No indication for AED per neurosurgery  Continue neurochecks   Neurosurgery and oncology recommending repeat high-volume LP as previous was nondiagnostic  Post LP 4/7 which was again undiagnostic. Oncology in discussion with neurosurgery for potential biopsy of the mass  "

## 2025-04-10 NOTE — CASE MANAGEMENT
Case Management Discharge Planning Note    Patient name Alexis Dennison  Location [facility] 705/[facility] 705-01 MRN [id number]  : 1960 Date 4/10/2025       Current Admission Date: 3/29/2025  Current Admission Diagnosis:Brain tumor (HCC)   Patient Active Problem List    Diagnosis Date Noted Date Diagnosed    Encephalopathy 2025     Constipation 2025     Ambulatory dysfunction 2025     Thyroid nodule 2025     Pulmonary nodule 2025     Liver lesion 2025     Brain tumor (HCC) 2025     Type 2 diabetes mellitus with hyperglycemia, without long-term current use of insulin (HCC) 2022     HTN, goal below 130/80 2022     Mixed hyperlipidemia 2022     Vitamin D deficiency 2022     NAFLD (nonalcoholic fatty liver disease) 2022     Fatigue 2022       LOS (days): 12  Geometric Mean LOS (GMLOS) (days): 6.7  Days to GMLOS:-5     OBJECTIVE:  Risk of Unplanned Readmission Score: 11.93         Current admission status: Inpatient   Preferred Pharmacy:   [facility]/pharmacy #[id number] - [place], [place] - [address] [address]  [address] [address]  Phone: [phone] Fax: [phone]    [facility] - [place], [place] - [address]  [address]  [facility] [address]  Phone: [phone] Fax: [phone]    Primary Care Provider: PATI Mckeon    Primary Insurance: BLUE CROSS  Secondary Insurance: CAPITAL    DISCHARGE DETAILS:    Discharge planning discussed with:: Elizabeth        Additional Comments: Elizabeth phoned CM twice for an update on treatment plan- CM made providers aware.  Oncology phoned Elizabeth with  plan of care. Patient for MRI today or tomorrow, possible direct biopsy on Monday.

## 2025-04-10 NOTE — ASSESSMENT & PLAN NOTE
Admitted 3/29 with worsening confusion since prior ED presentation and outpatient imaging  Seen at [facility] ED on 3/24 with 3 week history of altered mental status, memory difficulty  CTH 3/24 demonstrated multiple hyperdense ependyma/subependymal nodules  MRI brain 3/26 performed outpatient demonstrating multiple scattered foci of subependymal nodular enhancement with mild hydrocephalus, scattered nodular areas of enhancement in left anterior inferior basal ganglia dn foci of nodular enhancement along anteromedial aspect of left cerebral peduncle and left quirino  CTH 3/29 (this admission)  Stable subependymal nodules and left formamen of De Oliveira region hyperdense lesion with dilation of left lateral ventrical and minimal left to right midline shift  S/p LP 3/31  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 Reached out to neurosurgery to confirm whether plan remains for third LP - further evaluation pending at this time

## 2025-04-10 NOTE — PROGRESS NOTES
"Progress Note - Hospitalist   Name: Alexis Dennison 64 y.o. male I MRN: [id number]  Unit/Bed#: [facility] 705-01 I Date of Admission: 3/29/2025   Date of Service: 4/10/2025 I Hospital Day: 12    Assessment & Plan  Brain tumor (HCC)  Patient with development of worsening confusion, recently seen at the [facility] ED 3/24/2025 with CT head at that time with finding of brain lesion for which MRI was advised to exclude cancerous etiology.     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  Lymphoma/leukemia panel nondiagnostic  CT head on 4/3 with interval increase in ventricular caliber concerning for worsening hydrocephalus  No indication for AED per neurosurgery  Continue neurochecks   Neurosurgery and oncology recommending repeat high-volume LP as previous was nondiagnostic  Post LP 4/7 which was again undiagnostic. Oncology in discussion with neurosurgery for potential biopsy of the mass  Encephalopathy  Due to brain mass as above  Patient with acute onset confusion, forgetting names/places, oriented to self only most of the times  Delirium precautions  CT on 4/3 with findings concerning of worsening hydrocephalus  Type 2 diabetes mellitus with hyperglycemia, without long-term current use of insulin (HCC)  Has been well-controlled as outpatient with hemoglobin A1c of 6.6%  Hold home oral medications, start correctional insulin coverage   Accu-Cheks reviewed and acceptable  Continue hypoglycemia protocol and carb " controlled diet  Mixed hyperlipidemia  Continue statin  HTN, goal below 130/80  Continue losartan and hydrochlorothiazide  BP reviewed and acceptable  Liver lesion  MRI obtained: No suspicious hepatic lesions.  There is a simple right hepatic lobe cyst.  Mild diffuse hepatic steatosis.  LFTs stable  Outpatient follow up advised  Thyroid nodule  Incidental findings on CT scan  Nonemergent thyroid ultrasound for follow-up in the outpatient setting  Pulmonary nodule  Imaging with 4 mm right lower lobe pulmonary nodule.  CT chest 3-month follow-up  Constipation  Continue senna, MiraLAX and suppository  Ambulate patient as able  Ambulatory dysfunction  PT/OT recommending level 2 at discharge  Awaiting medically stability  Ambulate patient as able  Fall precautions    VTE Pharmacologic Prophylaxis: VTE Score: 5 High Risk (Score >/= 5) - Pharmacological DVT Prophylaxis Ordered: enoxaparin (Lovenox). Sequential Compression Devices Ordered.    Mobility:   Basic Mobility Inpatient Raw Score: 18  JH-HLM Goal: 6: Walk 10 steps or more  JH-HLM Achieved: 7: Walk 25 feet or more  JH-HLM Goal achieved. Continue to encourage appropriate mobility.    Patient Centered Rounds: I performed bedside rounds with nursing staff today.   Discussions with Specialists or Other Care Team Provider: RN, case management    Education and Discussions with Family / Patient: Updated  (wife) at bedside and daughter Elizabeth via phone.    Current Length of Stay: 12 day(s)  Current Patient Status: Inpatient   Certification Statement: The patient will continue to require additional inpatient hospital stay due to pending LP results  Discharge Plan: Anticipate discharge in >72 hrs to rehab facility.    Code Status: Level 1 - Full Code    Subjective   Patient seen and examined, not in acute distress, confused, slow to respond.    Objective :  Temp:  [97.9 °F (36.6 °C)-98.3 °F (36.8 °C)] 98.3 °F (36.8 °C)  HR:  [51-71] 63  BP: (109-129)/(69-74)  109/69  Resp:  [12-18] 12  SpO2:  [95 %-98 %] 95 %  O2 Device: None (Room air)    Body mass index is 27.26 kg/m².     Input and Output Summary (last 24 hours):     Intake/Output Summary (Last 24 hours) at 4/10/2025 1936  Last data filed at 4/10/2025 1700  Gross per 24 hour   Intake 410 ml   Output --   Net 410 ml       Physical Exam  Vitals and nursing note reviewed.   Constitutional:       General: He is not in acute distress.     Appearance: He is well-developed.   HENT:      Head: Normocephalic and atraumatic.   Cardiovascular:      Rate and Rhythm: Normal rate and regular rhythm.   Pulmonary:      Effort: No respiratory distress.      Breath sounds: Normal breath sounds. No wheezing or rhonchi.   Abdominal:      Palpations: Abdomen is soft.      Tenderness: There is no abdominal tenderness.   Musculoskeletal:         General: No swelling.      Cervical back: Neck supple.   Skin:     General: Skin is warm and dry.   Neurological:      Mental Status: He is alert. He is disoriented.           Lines/Drains:              Lab Results: I have reviewed the following results:   Results from last 7 days   Lab Units 04/09/25  0458   WBC Thousand/uL 7.11   HEMOGLOBIN g/dL 15.3   HEMATOCRIT % 43.9   PLATELETS Thousands/uL 191   SEGS PCT % 56   LYMPHO PCT % 34   MONO PCT % 7   EOS PCT % 2     Results from last 7 days   Lab Units 04/09/25  0458 04/06/25  0501 04/04/25  0908   SODIUM mmol/L 143   < > 141   POTASSIUM mmol/L 4.2   < > 3.6   CHLORIDE mmol/L 104   < > 107   CO2 mmol/L 30   < > 27   BUN mg/dL 16   < > 14   CREATININE mg/dL 0.98   < > 1.02   ANION GAP mmol/L 9   < > 7   CALCIUM mg/dL 9.6   < > 8.5   ALBUMIN g/dL  --   --  3.5   TOTAL BILIRUBIN mg/dL  --   --  0.90   ALK PHOS U/L  --   --  41   ALT U/L  --   --  22   AST U/L  --   --  11*   GLUCOSE RANDOM mg/dL 115   < > 135    < > = values in this interval not displayed.     Results from last 7 days   Lab Units 04/07/25  0922   INR  0.95     Results from last 7 days    Lab Units 04/10/25  1624 04/10/25  1125 04/10/25  0655 04/09/25  2108 04/09/25  1619 04/09/25  1151 04/09/25  0612 04/08/25  2138 04/08/25  1446 04/08/25  1057 04/08/25  0621 04/07/25  2120   POC GLUCOSE mg/dl 129 202* 110 138 151* 201* 122 151* 158* 166* 95 153*               Recent Cultures (last 7 days):   Results from last 7 days   Lab Units 04/07/25  1300   GRAM STAIN RESULT  Rare Mononuclear Cells  Rare Polys  No No organisms seen             Last 24 Hours Medication List:     Current Facility-Administered Medications:     acetaminophen (TYLENOL) tablet 650 mg, Q6H PRN    aluminum-magnesium hydroxide-simethicone (MAALOX) oral suspension 30 mL, Q4H PRN    atorvastatin (LIPITOR) tablet 20 mg, Daily    bisacodyl (DULCOLAX) rectal suppository 10 mg, Daily    Cholecalciferol (VITAMIN D3) tablet 2,000 Units, Daily    cyanocobalamin (VITAMIN B-12) tablet 1,000 mcg, Daily    heparin (porcine) subcutaneous injection 5,000 Units, Q12H CAIT    losartan (COZAAR) tablet 100 mg, Daily **AND** hydroCHLOROthiazide tablet 25 mg, Daily    insulin lispro (HumALOG/ADMELOG) 100 units/mL subcutaneous injection 1-5 Units, HS    insulin lispro (HumALOG/ADMELOG) 100 units/mL subcutaneous injection 1-6 Units, TID AC **AND** Fingerstick Glucose (POCT), TID AC    ondansetron (ZOFRAN) injection 4 mg, Q6H PRN    pantoprazole (PROTONIX) EC tablet 20 mg, Early Morning    polyethylene glycol (MIRALAX) packet 17 g, Daily    senna (SENOKOT) tablet 17.2 mg, HS    Administrative Statements   Today, Patient Was Seen By: Rosa Lockhart MD      **Please Note: This note may have been constructed using a voice recognition system.**

## 2025-04-10 NOTE — ASSESSMENT & PLAN NOTE
CTA 3/29 with nonspecific 12mm hypodense right hepatic lesion, recommended MRI abdomen  MRI 3/30 with no suspicious hepatic lesions, demonstrated simple right hepatic lobe cyst

## 2025-04-10 NOTE — PROGRESS NOTES
Progress Note - Oncology-Medical   Name: Alexis Dennison 64 y.o. male I MRN: [id number]  Unit/Bed#: [facility] 705-01 I Date of Admission: 3/29/2025   Date of Service: 4/10/2025 I Hospital Day: 12    Assessment & Plan  Brain tumor (HCC)  Admitted 3/29 with worsening confusion since prior ED presentation and outpatient imaging  Seen at [facility] ED on 3/24 with 3 week history of altered mental status, memory difficulty  CTH 3/24 demonstrated multiple hyperdense ependyma/subependymal nodules  MRI brain 3/26 performed outpatient demonstrating multiple scattered foci of subependymal nodular enhancement with mild hydrocephalus, scattered nodular areas of enhancement in left anterior inferior basal ganglia dn foci of nodular enhancement along anteromedial aspect of left cerebral peduncle and left quirino  CTH 3/29 (this admission)  Stable subependymal nodules and left formamen of De Oliveira region hyperdense lesion with dilation of left lateral ventrical and minimal left to right midline shift  S/p LP 3/31  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 Reached out to neurosurgery to confirm whether plan remains for third LP - further evaluation pending at this time  Encephalopathy  Most likely d/t brain mass, see A/P above    Pulmonary nodule  CTA 3/29 with nonspecific 4 mm RLL pulm nodule, recommendation for 3 month follow up  Liver lesion  CTA 3/29 with nonspecific 12mm hypodense right hepatic lesion, recommended MRI abdomen  MRI 3/30 with no suspicious hepatic lesions, demonstrated simple right hepatic lobe  "cyst    Subjective   Interval history  Pt is seen sitting in recliner today. Able to tell me his name and date of birth, but with significant confusion during remainder of conversation. Tells me that he is \"draining the lines\" when asked how he is feeling, with word salad response when asked if he knows where he is. Per RN, he has been having increased verbal confusion and behaviors as well, such as trying to pour water into cup of pills.      Objective :  Temp:  [97.9 °F (36.6 °C)-98.4 °F (36.9 °C)] 97.9 °F (36.6 °C)  HR:  [51-71] 71  BP: (111-129)/(68-74) 117/74  Resp:  [18] 18  SpO2:  [94 %-98 %] 96 %  O2 Device: None (Room air)      Physical Exam  Vitals reviewed.   Constitutional:       General: He is awake. He is not in acute distress.  Cardiovascular:      Rate and Rhythm: Normal rate and regular rhythm.   Pulmonary:      Effort: Pulmonary effort is normal. No respiratory distress.   Abdominal:      General: There is no distension.      Palpations: Abdomen is soft.   Skin:     General: Skin is warm and dry.      Capillary Refill: Capillary refill takes less than 2 seconds.   Neurological:      Mental Status: He is disoriented and confused.      Comments: Oriented to self and  only, nonsensical responses when asked re: place and times           Lab Results: I have reviewed the following results:No new results in last 24 hours.   Lab Results   Component Value Date    K 4.2 2025     2025    CO2 30 2025    BUN 16 2025    CREATININE 0.98 2025    GLUF 131 (H) 2025    CALCIUM 9.6 2025    AST 11 (L) 2025    ALT 22 2025    ALKPHOS 41 2025    EGFR 80 2025     Lab Results   Component Value Date    WBC 7.11 2025    HGB 15.3 2025    HCT 43.9 2025    MCV 89 2025     2025     Lab Results   Component Value Date    NEUTROABS 3.98 2025       Imaging Results Review: I reviewed radiology reports from this " admission including: CT abdomen/pelvis, CT head, and MRI abdomen/MRCP.  Other Study Results Review: No additional pertinent studies reviewed.    Administrative Statements   I have spent a total time of 20 minutes in caring for this patient on the day of the visit/encounter including Counseling / Coordination of care, Documenting in the medical record, Obtaining or reviewing history  , and Communicating with other healthcare professionals .

## 2025-04-10 NOTE — RESTORATIVE TECHNICIAN NOTE
Restorative Technician Note      Patient Name: Alexis Dennison     Note Type: Mobility  Patient Position Upon Consult: Bedside chair  Activity Performed: Ambulated; Dangled; Stood  Assistive Device: Other (Comment) (A x1)  Education Provided: Yes  Patient Position at End of Consult: Bedside chair; Bed/Chair alarm activated; All needs within reach    Cande BANEGAS, Restorative Technician,

## 2025-04-11 ENCOUNTER — APPOINTMENT (INPATIENT)
Dept: RADIOLOGY | Facility: HOSPITAL | Age: 65
DRG: 820 | End: 2025-04-11
Payer: COMMERCIAL

## 2025-04-11 LAB
ANION GAP SERPL CALCULATED.3IONS-SCNC: 7 MMOL/L (ref 4–13)
BASOPHILS # BLD AUTO: 0.03 THOUSANDS/ÂΜL (ref 0–0.1)
BASOPHILS NFR BLD AUTO: 0 % (ref 0–1)
BUN SERPL-MCNC: 20 MG/DL (ref 5–25)
CALCIUM SERPL-MCNC: 9.3 MG/DL (ref 8.4–10.2)
CHLORIDE SERPL-SCNC: 103 MMOL/L (ref 96–108)
CO2 SERPL-SCNC: 30 MMOL/L (ref 21–32)
CREAT SERPL-MCNC: 0.94 MG/DL (ref 0.6–1.3)
EOSINOPHIL # BLD AUTO: 0.12 THOUSAND/ÂΜL (ref 0–0.61)
EOSINOPHIL NFR BLD AUTO: 2 % (ref 0–6)
ERYTHROCYTE [DISTWIDTH] IN BLOOD BY AUTOMATED COUNT: 11.9 % (ref 11.6–15.1)
GFR SERPL CREATININE-BSD FRML MDRD: 84 ML/MIN/1.73SQ M
GLUCOSE SERPL-MCNC: 111 MG/DL (ref 65–140)
GLUCOSE SERPL-MCNC: 116 MG/DL (ref 65–140)
GLUCOSE SERPL-MCNC: 143 MG/DL (ref 65–140)
GLUCOSE SERPL-MCNC: 177 MG/DL (ref 65–140)
GLUCOSE SERPL-MCNC: 213 MG/DL (ref 65–140)
HCT VFR BLD AUTO: 44.7 % (ref 36.5–49.3)
HGB BLD-MCNC: 15.1 G/DL (ref 12–17)
IMM GRANULOCYTES # BLD AUTO: 0.01 THOUSAND/UL (ref 0–0.2)
IMM GRANULOCYTES NFR BLD AUTO: 0 % (ref 0–2)
LYMPHOCYTES # BLD AUTO: 2.55 THOUSANDS/ÂΜL (ref 0.6–4.47)
LYMPHOCYTES NFR BLD AUTO: 38 % (ref 14–44)
MCH RBC QN AUTO: 30.6 PG (ref 26.8–34.3)
MCHC RBC AUTO-ENTMCNC: 33.8 G/DL (ref 31.4–37.4)
MCV RBC AUTO: 91 FL (ref 82–98)
MONOCYTES # BLD AUTO: 0.45 THOUSAND/ÂΜL (ref 0.17–1.22)
MONOCYTES NFR BLD AUTO: 7 % (ref 4–12)
NEUTROPHILS # BLD AUTO: 3.57 THOUSANDS/ÂΜL (ref 1.85–7.62)
NEUTS SEG NFR BLD AUTO: 53 % (ref 43–75)
NRBC BLD AUTO-RTO: 0 /100 WBCS
PLATELET # BLD AUTO: 188 THOUSANDS/UL (ref 149–390)
PMV BLD AUTO: 10.5 FL (ref 8.9–12.7)
POTASSIUM SERPL-SCNC: 3.9 MMOL/L (ref 3.5–5.3)
RBC # BLD AUTO: 4.94 MILLION/UL (ref 3.88–5.62)
SODIUM SERPL-SCNC: 140 MMOL/L (ref 135–147)
WBC # BLD AUTO: 6.73 THOUSAND/UL (ref 4.31–10.16)

## 2025-04-11 PROCEDURE — 80048 BASIC METABOLIC PNL TOTAL CA: CPT | Performed by: FAMILY MEDICINE

## 2025-04-11 PROCEDURE — 70553 MRI BRAIN STEM W/O & W/DYE: CPT

## 2025-04-11 PROCEDURE — A9585 GADOBUTROL INJECTION: HCPCS | Performed by: FAMILY MEDICINE

## 2025-04-11 PROCEDURE — 85025 COMPLETE CBC W/AUTO DIFF WBC: CPT | Performed by: FAMILY MEDICINE

## 2025-04-11 PROCEDURE — 82948 REAGENT STRIP/BLOOD GLUCOSE: CPT

## 2025-04-11 PROCEDURE — 99233 SBSQ HOSP IP/OBS HIGH 50: CPT | Performed by: INTERNAL MEDICINE

## 2025-04-11 PROCEDURE — 99232 SBSQ HOSP IP/OBS MODERATE 35: CPT | Performed by: FAMILY MEDICINE

## 2025-04-11 RX ORDER — GADOBUTROL 604.72 MG/ML
10 INJECTION INTRAVENOUS
Status: COMPLETED | OUTPATIENT
Start: 2025-04-11 | End: 2025-04-11

## 2025-04-11 RX ADMIN — SENNOSIDES 17.2 MG: 8.6 TABLET, FILM COATED ORAL at 21:14

## 2025-04-11 RX ADMIN — ATORVASTATIN CALCIUM 20 MG: 20 TABLET, FILM COATED ORAL at 09:55

## 2025-04-11 RX ADMIN — HEPARIN SODIUM 5000 UNITS: 5000 INJECTION INTRAVENOUS; SUBCUTANEOUS at 21:14

## 2025-04-11 RX ADMIN — HEPARIN SODIUM 5000 UNITS: 5000 INJECTION INTRAVENOUS; SUBCUTANEOUS at 09:55

## 2025-04-11 RX ADMIN — INSULIN LISPRO 1 UNITS: 100 INJECTION, SOLUTION INTRAVENOUS; SUBCUTANEOUS at 21:14

## 2025-04-11 RX ADMIN — Medication 2000 UNITS: at 09:55

## 2025-04-11 RX ADMIN — INSULIN LISPRO 2 UNITS: 100 INJECTION, SOLUTION INTRAVENOUS; SUBCUTANEOUS at 11:10

## 2025-04-11 RX ADMIN — GADOBUTROL 10 ML: 604.72 INJECTION INTRAVENOUS at 18:12

## 2025-04-11 RX ADMIN — CYANOCOBALAMIN TAB 500 MCG 1000 MCG: 500 TAB at 09:55

## 2025-04-11 RX ADMIN — PANTOPRAZOLE SODIUM 20 MG: 20 TABLET, DELAYED RELEASE ORAL at 05:58

## 2025-04-11 RX ADMIN — LOSARTAN POTASSIUM 100 MG: 50 TABLET, FILM COATED ORAL at 09:55

## 2025-04-11 NOTE — PLAN OF CARE
Problem: SAFETY ADULT  Goal: Patient will remain free of falls  Description: INTERVENTIONS:- Educate patient/family on patient safety including physical limitations- Instruct patient to call for assistance with activity - Consult OT/PT to assist with strengthening/mobility - Keep Call bell within reach- Keep bed low and locked with side rails adjusted as appropriate- Keep care items and personal belongings within reach- Initiate and maintain comfort rounds- Make Fall Risk Sign visible to staff- Offer Toileting every 2 Hours, in advance of need- Initiate/Maintain bed/chair alarm- Obtain necessary fall risk management equipment.- Apply yellow socks and bracelet for high fall risk patients- Consider moving patient to room near nurses station  Outcome: Progressing  Goal: Maintain or return to baseline ADL function  Description: INTERVENTIONS:-  Assess patient's ability to carry out ADLs; assess patient's baseline for ADL function and identify physical deficits which impact ability to perform ADLs (bathing, care of mouth/teeth, toileting, grooming, dressing, etc.)- Assess/evaluate cause of self-care deficits - Assess range of motion- Assess patient's mobility; develop plan if impaired- Assess patient's need for assistive devices and provide as appropriate- Encourage maximum independence but intervene and supervise when necessary- Involve family in performance of ADLs- Assess for home care needs following discharge - Consider OT consult to assist with ADL evaluation and planning for discharge- Provide patient education as appropriate  Outcome: Progressing  Goal: Maintains/Returns to pre admission functional level  Description: INTERVENTIONS:- Perform AM-PAC 6 Click Basic Mobility/ Daily Activity assessment daily.- Set and communicate daily mobility goal to care team and patient/family/caregiver. - Collaborate with rehabilitation services on mobility goals if consulted- Reposition patient every 2 hours.- Dangle patient 3  times a day- Stand patient 3 times a day- Ambulate patient 3 times a day- Out of bed to chair 3 times a day - Out of bed for meals 3 times a day- Out of bed for toileting- Record patient progress and toleration of activity level   Outcome: Progressing     Problem: DISCHARGE PLANNING  Goal: Discharge to home or other facility with appropriate resources  Description: INTERVENTIONS:- Identify barriers to discharge w/patient and caregiver- Arrange for needed discharge resources and transportation as appropriate- Identify discharge learning needs (meds, wound care, etc.)- Refer to Case Management Department for coordinating discharge planning if the patient needs post-hospital services based on physician/advanced practitioner order or complex needs related to functional status, cognitive ability, or social support system  Outcome: Progressing     Problem: NEUROSENSORY - ADULT  Goal: Achieves stable or improved neurological status  Description: INTERVENTIONS- Monitor and report changes in neurological status- Monitor vital signs such as temperature, blood pressure, glucose, and any other labs ordered - Initiate measures to prevent increased intracranial pressure- Monitor for seizure activity and implement precautions if appropriate    Outcome: Progressing  Goal: Remains free of injury related to seizures activity  Description: INTERVENTIONS- Maintain airway, patient safety  and administer oxygen as ordered- Monitor patient for seizure activity, document and report duration and description of seizure to physician/advanced practitioner- If seizure occurs,  ensure patient safety during seizure- Reorient patient post seizure- Seizure pads on all 4 side rails- Instruct patient/family to notify RN of any seizure activity including if an aura is experienced- Instruct patient/family to call for assistance with activity based on nursing assessment- Administer anti-seizure medications if ordered  Outcome: Progressing  Goal:  Achieves maximal functionality and self care  Description: INTERVENTIONS- Monitor swallowing and airway patency with patient fatigue and changes in neurological status- Encourage and assist patient to increase activity and self care. - Encourage visually impaired, hearing impaired and aphasic patients to use assistive/communication devices  Outcome: Progressing     Problem: Prexisting or High Potential for Compromised Skin Integrity  Goal: Skin integrity is maintained or improved  Description: INTERVENTIONS:- Identify patients at risk for skin breakdown- Assess and monitor skin integrity- Assess and monitor nutrition and hydration status- Monitor labs - Assess for incontinence - Turn and reposition patient- Assist with mobility/ambulation- Relieve pressure over bony prominences- Avoid friction and shearing- Provide appropriate hygiene as needed including keeping skin clean and dry- Evaluate need for skin moisturizer/barrier cream- Collaborate with interdisciplinary team - Patient/family teaching- Consider wound care consult   Outcome: Progressing     Problem: METABOLIC, FLUID AND ELECTROLYTES - ADULT  Goal: Glucose maintained within target range  Description: INTERVENTIONS:- Monitor Blood Glucose as ordered- Assess for signs and symptoms of hyperglycemia and hypoglycemia- Administer ordered medications to maintain glucose within target range- Assess nutritional intake and initiate nutrition service referral as needed  Outcome: Progressing

## 2025-04-11 NOTE — PROGRESS NOTES
"Progress Note - Hospitalist   Name: Alexis Dennison 64 y.o. male I MRN: [id number]  Unit/Bed#: [facility] 705-01 I Date of Admission: 3/29/2025   Date of Service: 4/11/2025 I Hospital Day: 13    Assessment & Plan  Brain tumor (HCC)  Patient with development of worsening confusion, recently seen at the [facility] ED 3/24/2025 with CT head at that time with finding of brain lesion for which MRI was advised to exclude cancerous etiology.     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  Lymphoma/leukemia panel nondiagnostic  CT head on 4/3 with interval increase in ventricular caliber concerning for worsening hydrocephalus  No indication for [facility] neurosurgery  [facility]   Neurosurgery and oncology recommended repeat high-volume LP as previous was nondiagnostic  Post LP 4/7 which was again undiagnostic.   Plan for repeat MRI and ongoing discussion about possible biopsy on Monday, 4/14  Encephalopathy  Due to brain mass as above  Patient with acute onset confusion, forgetting names/places, oriented to self only most of the times  Delirium precautions  CT on 4/3 with findings concerning of worsening hydrocephalus  Type 2 diabetes mellitus with hyperglycemia, without long-term current use of insulin ([facility])  Has been well-controlled as outpatient with hemoglobin A1c of 6.6%  Hold home oral medications, start correctional insulin coverage   Accu-Cheks reviewed and acceptable  Continue hypoglycemia protocol " and carb controlled diet  Mixed hyperlipidemia  Continue statin  HTN, goal below 130/80  Continue losartan and hydrochlorothiazide  BP reviewed and acceptable  Liver lesion  MRI obtained: No suspicious hepatic lesions.  There is a simple right hepatic lobe cyst.  Mild diffuse hepatic steatosis.  LFTs stable  Outpatient follow up advised  Thyroid nodule  Incidental findings on CT scan  Nonemergent thyroid ultrasound for follow-up in the outpatient setting  Pulmonary nodule  Imaging with 4 mm right lower lobe pulmonary nodule.  CT chest 3-month follow-up  Constipation  Continue senna, MiraLAX and suppository  Ambulate patient as able  Ambulatory dysfunction  PT/OT recommending level 2 at discharge  Awaiting medically stability  Ambulate patient as able  Fall precautions    VTE Pharmacologic Prophylaxis: VTE Score: 5 High Risk (Score >/= 5) - Pharmacological DVT Prophylaxis Ordered: enoxaparin (Lovenox). Sequential Compression Devices Ordered.    Mobility:   Basic Mobility Inpatient Raw Score: 18  JH-HLM Goal: 6: Walk 10 steps or more  JH-HLM Achieved: 6: Walk 10 steps or more  JH-HLM Goal achieved. Continue to encourage appropriate mobility.    Patient Centered Rounds: I performed bedside rounds with nursing staff today.   Discussions with Specialists or Other Care Team Provider: RN, case management    Education and Discussions with Family / Patient: Updated  (wife and sister) at bedside.    Current Length of Stay: 13 day(s)  Current Patient Status: Inpatient   Certification Statement: The patient will continue to require additional inpatient hospital stay due to pending LP results  Discharge Plan: Anticipate discharge in >72 hrs to rehab facility.    Code Status: Level 1 - Full Code    Subjective   Patient seen and examined, not in acute distress.    Objective :  Temp:  [97.8 °F (36.6 °C)-99.2 °F (37.3 °C)] 97.8 °F (36.6 °C)  HR:  [51-65] 55  BP: (117-147)/(66-79) 117/68  Resp:  [15-18] 18  SpO2:  [96  %-99 %] 99 %    Body mass index is 27.26 kg/m².     Input and Output Summary (last 24 hours):   No intake or output data in the 24 hours ending 04/11/25 1708      Physical Exam  Vitals and nursing note reviewed.   Constitutional:       General: He is not in acute distress.     Appearance: He is well-developed.   HENT:      Head: Normocephalic and atraumatic.   Cardiovascular:      Rate and Rhythm: Normal rate and regular rhythm.   Pulmonary:      Effort: No respiratory distress.      Breath sounds: Normal breath sounds. No wheezing or rhonchi.   Abdominal:      Palpations: Abdomen is soft.      Tenderness: There is no abdominal tenderness.   Musculoskeletal:         General: No swelling.      Cervical back: Neck supple.   Skin:     General: Skin is warm and dry.   Neurological:      Mental Status: He is alert. He is disoriented.           Lines/Drains:              Lab Results: I have reviewed the following results:   Results from last 7 days   Lab Units 04/11/25  0445   WBC Thousand/uL 6.73   HEMOGLOBIN g/dL 15.1   HEMATOCRIT % 44.7   PLATELETS Thousands/uL 188   SEGS PCT % 53   LYMPHO PCT % 38   MONO PCT % 7   EOS PCT % 2     Results from last 7 days   Lab Units 04/11/25  0445   SODIUM mmol/L 140   POTASSIUM mmol/L 3.9   CHLORIDE mmol/L 103   CO2 mmol/L 30   BUN mg/dL 20   CREATININE mg/dL 0.94   ANION GAP mmol/L 7   CALCIUM mg/dL 9.3   GLUCOSE RANDOM mg/dL 111     Results from last 7 days   Lab Units 04/07/25  0922   INR  0.95     Results from last 7 days   Lab Units 04/11/25  1620 04/11/25  1107 04/11/25  0608 04/10/25  2037 04/10/25  1624 04/10/25  1125 04/10/25  0655 04/09/25  2108 04/09/25  1619 04/09/25  1151 04/09/25  0612 04/08/25  2138   POC GLUCOSE mg/dl 116 213* 143* 159* 129 202* 110 138 151* 201* 122 151*               Recent Cultures (last 7 days):   Results from last 7 days   Lab Units 04/07/25  1300   GRAM STAIN RESULT  Rare Mononuclear Cells  Rare Polys  No No organisms seen             Last 24  Hours Medication List:     Current Facility-Administered Medications:     acetaminophen (TYLENOL) tablet 650 mg, Q6H PRN    aluminum-magnesium hydroxide-simethicone (MAALOX) oral suspension 30 mL, Q4H PRN    atorvastatin (LIPITOR) tablet 20 mg, Daily    bisacodyl (DULCOLAX) rectal suppository 10 mg, Daily    Cholecalciferol (VITAMIN D3) tablet 2,000 Units, Daily    cyanocobalamin (VITAMIN B-12) tablet 1,000 mcg, Daily    heparin (porcine) subcutaneous injection 5,000 Units, Q12H CAIT    losartan (COZAAR) tablet 100 mg, Daily **AND** hydroCHLOROthiazide tablet 25 mg, Daily    insulin lispro (HumALOG/ADMELOG) 100 units/mL subcutaneous injection 1-5 Units, HS    insulin lispro (HumALOG/ADMELOG) 100 units/mL subcutaneous injection 1-6 Units, TID AC **AND** Fingerstick Glucose (POCT), TID AC    ondansetron (ZOFRAN) injection 4 mg, Q6H PRN    pantoprazole (PROTONIX) EC tablet 20 mg, Early Morning    polyethylene glycol (MIRALAX) packet 17 g, Daily    senna (SENOKOT) tablet 17.2 mg, HS    Administrative Statements   Today, Patient Was Seen By: Rosa Lockhart MD      **Please Note: This note may have been constructed using a voice recognition system.**

## 2025-04-11 NOTE — ASSESSMENT & PLAN NOTE
"Patient with development of worsening confusion, recently seen at the [facility] ED 3/24/2025 with CT head at that time with finding of brain lesion for which MRI was advised to exclude cancerous etiology.     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  Lymphoma/leukemia panel nondiagnostic  CT head on 4/3 with interval increase in ventricular caliber concerning for worsening hydrocephalus  No indication for AED per neurosurgery  Continue neurochecks   Neurosurgery and oncology recommended repeat high-volume LP as previous was nondiagnostic  Post LP 4/7 which was again undiagnostic.   Plan for repeat MRI and ongoing discussion about possible biopsy on Monday, 4/14  "

## 2025-04-11 NOTE — ASSESSMENT & PLAN NOTE
Admitted 3/29 with worsening confusion since prior ED presentation and outpatient imaging  Seen at [facility] ED on 3/24 with 3 week history of altered mental status, memory difficulty  CTH 3/24 demonstrated multiple hyperdense ependyma/subependymal nodules  MRI brain 3/26 performed outpatient demonstrating multiple scattered foci of subependymal nodular enhancement with mild hydrocephalus, scattered nodular areas of enhancement in left anterior inferior basal ganglia dn foci of nodular enhancement along anteromedial aspect of left cerebral peduncle and left quirino  CTH 3/29 (this admission)  Stable subependymal nodules and left formamen of De Oliveira region hyperdense lesion with dilation of left lateral ventrical and minimal left to right midline shift  S/p LP 3/31  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   MRI Brain was ordered by NSGY and is pending  Ongoing discussions with NSGY to confirm plans for biopsy--possibly on Monday

## 2025-04-11 NOTE — PROGRESS NOTES
Progress Note - Oncology-Medical   Name: Alexis Dennison 64 y.o. male I MRN: [id number]  Unit/Bed#: [facility] 705-01 I Date of Admission: 3/29/2025   Date of Service: 4/11/2025 I Hospital Day: 13    Assessment & Plan  Brain tumor (HCC)  Admitted 3/29 with worsening confusion since prior ED presentation and outpatient imaging  Seen at [facility] ED on 3/24 with 3 week history of altered mental status, memory difficulty  CTH 3/24 demonstrated multiple hyperdense ependyma/subependymal nodules  MRI brain 3/26 performed outpatient demonstrating multiple scattered foci of subependymal nodular enhancement with mild hydrocephalus, scattered nodular areas of enhancement in left anterior inferior basal ganglia dn foci of nodular enhancement along anteromedial aspect of left cerebral peduncle and left quirino  CTH 3/29 (this admission)  Stable subependymal nodules and left formamen of De Oliveira region hyperdense lesion with dilation of left lateral ventrical and minimal left to right midline shift  S/p LP 3/31  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   MRI Brain was ordered by NSGY and is pending  Ongoing discussions with NSGY to confirm plans for biopsy--possibly on Monday  Encephalopathy  Most likely d/t brain mass, see A/P above  Pulmonary nodule  CTA 3/29 with nonspecific 4 mm RLL pulm nodule, recommendation for 3 month follow up  Liver lesion  CTA 3/29 with nonspecific 12mm hypodense right hepatic lesion, recommended MRI abdomen  MRI 3/30 with no suspicious hepatic lesions, demonstrated simple right  hepatic lobe cyst    Subjective   NAEON, patient alert and attentive to discussion, we discussed with the patient's sister and wife at bedside regarding his hospital course and our discussions with neurosurgery, MRI brain has been ordered and is pending, ongoing discussions with neurosurgery    Objective :  Temp:  [97.8 °F (36.6 °C)-99.2 °F (37.3 °C)] 97.8 °F (36.6 °C)  HR:  [51-65] 51  BP: (109-147)/(66-79) 117/66  Resp:  [12-15] 15  SpO2:  [95 %-96 %] 96 %    Physical Exam  Vitals reviewed.   Constitutional:       General: He is awake. He is not in acute distress.  Cardiovascular:      Rate and Rhythm: Normal rate and regular rhythm.   Pulmonary:      Effort: Pulmonary effort is normal. No respiratory distress.   Abdominal:      General: There is no distension.      Palpations: Abdomen is soft.   Skin:     General: Skin is warm and dry.      Capillary Refill: Capillary refill takes less than 2 seconds.   Neurological:      Mental Status: He is disoriented and confused.      Comments: Oriented to self and  only, nonsensical responses when asked re: place and times       Lab Results: I have reviewed the following results:CBC/BMP:   .     25  0445   WBC 6.73   HGB 15.1   HCT 44.7      SODIUM 140   K 3.9      CO2 30   BUN 20   CREATININE 0.94   GLUC 111      Lab Results   Component Value Date    K 3.9 2025     2025    CO2 30 2025    BUN 20 2025    CREATININE 0.94 2025    GLUF 131 (H) 2025    CALCIUM 9.3 2025    AST 11 (L) 2025    ALT 22 2025    ALKPHOS 41 2025    EGFR 84 2025     Lab Results   Component Value Date    WBC 6.73 2025    HGB 15.1 2025    HCT 44.7 2025    MCV 91 2025     2025     Lab Results   Component Value Date    NEUTROABS 3.57 2025     Imaging Results Review: No pertinent imaging studies reviewed.  Other Study Results Review: No additional pertinent studies  reviewed.    Administrative Statements   I have spent a total time of 35 minutes in caring for this patient on the day of the visit/encounter including Diagnostic results, Prognosis, Risks and benefits of tx options, Instructions for management, Patient and family education, Importance of tx compliance, Risk factor reductions, Impressions, Counseling / Coordination of care, Documenting in the medical record, Reviewing/placing orders in the medical record (including tests, medications, and/or procedures), Obtaining or reviewing history  , and Communicating with other healthcare professionals .    Additional recommendations to follow per attending, Dr. Ortiz.    Bryson Garcia DO, PGY4  Hematology/Oncology Fellow

## 2025-04-12 PROBLEM — L53.9 FOOT ERYTHEMA: Status: ACTIVE | Noted: 2025-04-12

## 2025-04-12 LAB
BASOPHILS # BLD AUTO: 0.05 THOUSANDS/ÂΜL (ref 0–0.1)
BASOPHILS NFR BLD AUTO: 1 % (ref 0–1)
EOSINOPHIL # BLD AUTO: 0.12 THOUSAND/ÂΜL (ref 0–0.61)
EOSINOPHIL NFR BLD AUTO: 2 % (ref 0–6)
ERYTHROCYTE [DISTWIDTH] IN BLOOD BY AUTOMATED COUNT: 11.8 % (ref 11.6–15.1)
GLUCOSE SERPL-MCNC: 101 MG/DL (ref 65–140)
GLUCOSE SERPL-MCNC: 121 MG/DL (ref 65–140)
GLUCOSE SERPL-MCNC: 129 MG/DL (ref 65–140)
GLUCOSE SERPL-MCNC: 142 MG/DL (ref 65–140)
HCT VFR BLD AUTO: 46.2 % (ref 36.5–49.3)
HGB BLD-MCNC: 16.2 G/DL (ref 12–17)
IMM GRANULOCYTES # BLD AUTO: 0.03 THOUSAND/UL (ref 0–0.2)
IMM GRANULOCYTES NFR BLD AUTO: 0 % (ref 0–2)
LYMPHOCYTES # BLD AUTO: 2.48 THOUSANDS/ÂΜL (ref 0.6–4.47)
LYMPHOCYTES NFR BLD AUTO: 34 % (ref 14–44)
MCH RBC QN AUTO: 31.6 PG (ref 26.8–34.3)
MCHC RBC AUTO-ENTMCNC: 35.1 G/DL (ref 31.4–37.4)
MCV RBC AUTO: 90 FL (ref 82–98)
MONOCYTES # BLD AUTO: 0.54 THOUSAND/ÂΜL (ref 0.17–1.22)
MONOCYTES NFR BLD AUTO: 7 % (ref 4–12)
NEUTROPHILS # BLD AUTO: 4.18 THOUSANDS/ÂΜL (ref 1.85–7.62)
NEUTS SEG NFR BLD AUTO: 56 % (ref 43–75)
NRBC BLD AUTO-RTO: 0 /100 WBCS
PLATELET # BLD AUTO: 186 THOUSANDS/UL (ref 149–390)
PMV BLD AUTO: 10.2 FL (ref 8.9–12.7)
RBC # BLD AUTO: 5.12 MILLION/UL (ref 3.88–5.62)
WBC # BLD AUTO: 7.4 THOUSAND/UL (ref 4.31–10.16)

## 2025-04-12 PROCEDURE — 82948 REAGENT STRIP/BLOOD GLUCOSE: CPT

## 2025-04-12 PROCEDURE — 85025 COMPLETE CBC W/AUTO DIFF WBC: CPT | Performed by: FAMILY MEDICINE

## 2025-04-12 PROCEDURE — 99232 SBSQ HOSP IP/OBS MODERATE 35: CPT | Performed by: FAMILY MEDICINE

## 2025-04-12 PROCEDURE — NC001 PR NO CHARGE: Performed by: INTERNAL MEDICINE

## 2025-04-12 PROCEDURE — 99253 IP/OBS CNSLTJ NEW/EST LOW 45: CPT | Performed by: PODIATRIST

## 2025-04-12 RX ADMIN — CYANOCOBALAMIN TAB 500 MCG 1000 MCG: 500 TAB at 08:59

## 2025-04-12 RX ADMIN — ATORVASTATIN CALCIUM 20 MG: 20 TABLET, FILM COATED ORAL at 09:00

## 2025-04-12 RX ADMIN — Medication 2000 UNITS: at 09:00

## 2025-04-12 RX ADMIN — HEPARIN SODIUM 5000 UNITS: 5000 INJECTION INTRAVENOUS; SUBCUTANEOUS at 21:12

## 2025-04-12 RX ADMIN — POLYETHYLENE GLYCOL 3350 17 G: 17 POWDER, FOR SOLUTION ORAL at 09:02

## 2025-04-12 RX ADMIN — HEPARIN SODIUM 5000 UNITS: 5000 INJECTION INTRAVENOUS; SUBCUTANEOUS at 09:02

## 2025-04-12 NOTE — PROGRESS NOTES
Progress Note - Oncology-Medical   Name: Alexis Dennison 64 y.o. male I MRN: [id number]  Unit/Bed#: [facility] 705-01 I Date of Admission: 3/29/2025   Date of Service: 4/12/2025 I Hospital Day: 14    Assessment & Plan  Brain tumor (HCC)  Admitted 3/29 with worsening confusion since prior ED presentation and outpatient imaging  Seen at [facility] ED on 3/24 with 3 week history of altered mental status, memory difficulty  CTH 3/24 demonstrated multiple hyperdense ependyma/subependymal nodules  MRI brain 3/26 performed outpatient demonstrating multiple scattered foci of subependymal nodular enhancement with mild hydrocephalus, scattered nodular areas of enhancement in left anterior inferior basal ganglia dn foci of nodular enhancement along anteromedial aspect of left cerebral peduncle and left quirino  CTH 3/29 (this admission)  Stable subependymal nodules and left formamen of De Oliveira region hyperdense lesion with dilation of left lateral ventrical and minimal left to right midline shift  S/p LP 3/31  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   MRI Brain (4/11) showed progression of disease from 3/30 to 4/11 with interval enlargement of the dominant left anterior basal ganglionic mass with greater surrounding vasogenic edema and mass effect and persistent leptomeningeal and intraventricular seeding with obstructive hydrocephalus--current differential lymphoma versus high-grade glioma less likely metastasis  Discussed with NSGY who is planning for biopsy on Monday  Encephalopathy  Most likely d/t brain  mass, see A/P above  Pulmonary nodule  CTA 3/29 with nonspecific 4 mm RLL pulm nodule, recommendation for 3 month follow up  Liver lesion  CTA 3/29 with nonspecific 12mm hypodense right hepatic lesion, recommended MRI abdomen  MRI 3/30 with no suspicious hepatic lesions, demonstrated simple right hepatic lobe cyst    Subjective   NAEON, patient resting comfortably in bed, VSS, labs stable and roughly WNL, discussed case with neurosurgery yesterday evening    Objective :  Temp:  [98 °F (36.7 °C)] 98 °F (36.7 °C)  HR:  [49-55] 49  BP: (117-123)/(68-74) 123/74  Resp:  [18] 18  SpO2:  [97 %-99 %] 97 %  O2 Device: None (Room air)    Physical Exam  Vitals reviewed.   Constitutional:       General: He is awake. He is not in acute distress.  Cardiovascular:      Rate and Rhythm: Normal rate and regular rhythm.   Pulmonary:      Effort: Pulmonary effort is normal. No respiratory distress.   Abdominal:      General: There is no distension.      Palpations: Abdomen is soft.   Skin:     General: Skin is warm and dry.      Capillary Refill: Capillary refill takes less than 2 seconds.   Neurological:      Mental Status: He is disoriented and confused.      Comments: Oriented to self and  only, nonsensical responses when asked re: place and times       Lab Results: I have reviewed the following results:CBC/BMP: No new results in last 24 hours.   Lab Results   Component Value Date    K 3.9 2025     2025    CO2 30 2025    BUN 20 2025    CREATININE 0.94 2025    GLUF 131 (H) 2025    CALCIUM 9.3 2025    AST 11 (L) 2025    ALT 22 2025    ALKPHOS 41 2025    EGFR 84 2025     Lab Results   Component Value Date    WBC 6.73 2025    HGB 15.1 2025    HCT 44.7 2025    MCV 91 2025     2025     Lab Results   Component Value Date    NEUTROABS 3.57 2025     Imaging Results Review: I reviewed radiology reports from this admission  including: MRI brain.  Other Study Results Review: No additional pertinent studies reviewed.    Administrative Statements   I have spent a total time of 30 minutes in caring for this patient on the day of the visit/encounter including Diagnostic results, Prognosis, Risks and benefits of tx options, Instructions for management, Patient and family education, Importance of tx compliance, Risk factor reductions, Impressions, Counseling / Coordination of care, Documenting in the medical record, Reviewing/placing orders in the medical record (including tests, medications, and/or procedures), Obtaining or reviewing history  , and Communicating with other healthcare professionals .    Additional recommendations to follow per attending, Dr. Ortiz.    Bryson Garcia DO, PGY4  Hematology/Oncology Fellow

## 2025-04-12 NOTE — PROGRESS NOTES
"Progress Note - Hospitalist   Name: Alexis Dennison 64 y.o. male I MRN: [id number]  Unit/Bed#: [facility] 705-01 I Date of Admission: 3/29/2025   Date of Service: 4/12/2025 I Hospital Day: 14    Assessment & Plan  Brain tumor (HCC)  Patient with development of worsening confusion, recently seen at the [facility] ED 3/24/2025 with CT head at that time with finding of brain lesion for which MRI was advised to exclude cancerous etiology.     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  Lymphoma/leukemia panel nondiagnostic  CT head on 4/3 with interval increase in ventricular caliber concerning for worsening hydrocephalus  [facility] neurosurgery  [facility]   [facility]urosurgery and oncology recommended repeat high-volume LP as previous was nondiagnostic  Post LP 4/7 which was again undiagnostic.   MRI of the brain from 4/11-\"Interval enlargement of the dominant left anterior basal ganglionic mass lesion with greater surrounding vasogenic edema and mass effect. 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 Differential considerations include lymphoma, multicentric high-grade glioma, and less likely metastasis\"  Plan for biopsy on Monday, 4/14 by neurosurgery   Encephalopathy  Due to brain mass as above  Patient with acute onset confusion, forgetting names/places, oriented to self only most of the " times  Delirium precautions  CT on 4/3 with findings concerning of worsening hydrocephalus  Type 2 diabetes mellitus with hyperglycemia, without long-term current use of insulin (HCC)  Has been well-controlled as outpatient with hemoglobin A1c of 6.6%  Hold home oral medications, start correctional insulin coverage   Accu-Cheks reviewed and acceptable  Continue hypoglycemia protocol and carb controlled diet  Mixed hyperlipidemia  Continue statin  HTN, goal below 130/80  Continue losartan and hydrochlorothiazide  BP reviewed and acceptable  Liver lesion  MRI obtained: No suspicious hepatic lesions.  There is a simple right hepatic lobe cyst.  Mild diffuse hepatic steatosis.  LFTs stable  Outpatient follow up advised  Thyroid nodule  Incidental findings on CT scan  Nonemergent thyroid ultrasound for follow-up in the outpatient setting  Pulmonary nodule  Imaging with 4 mm right lower lobe pulmonary nodule.  CT chest 3-month follow-up  Constipation  Continue senna, MiraLAX and suppository  Ambulate patient as able  Foot erythema  Noted erythema and warmth to all toes on the left foot this morning (4/12), patient is confused- unable to relay any history  Given the patient is a high risk for complication will consult podiatry for evaluation of suspected cellulitis etc.  Ambulatory dysfunction  PT/OT recommending level 2 at discharge  Awaiting medically stability  Ambulate patient as able  Fall precautions    VTE Pharmacologic Prophylaxis: VTE Score: 5 High Risk (Score >/= 5) - Pharmacological DVT Prophylaxis Ordered: enoxaparin (Lovenox). Sequential Compression Devices Ordered.    Mobility:   Basic Mobility Inpatient Raw Score: 18  JH-HLM Goal: 6: Walk 10 steps or more  JH-HLM Achieved: 2: Bed activities/Dependent transfer  JH-HLM Goal achieved. Continue to encourage appropriate mobility.    Patient Centered Rounds: I performed bedside rounds with nursing staff today.   Discussions with Specialists or Other Care Team  Provider: RN, case management    Education and Discussions with Family / Patient: Updated  (wife) at bedside.    Current Length of Stay: 14 day(s)  Current Patient Status: Inpatient   Certification Statement: The patient will continue to require additional inpatient hospital stay due to pending LP results  Discharge Plan: Anticipate discharge in >72 hrs to rehab facility.    Code Status: Level 1 - Full Code    Subjective   Patient seen and examined, not in acute distress.    Objective :  Temp:  [97.9 °F (36.6 °C)-98.2 °F (36.8 °C)] 97.9 °F (36.6 °C)  HR:  [49-54] 54  BP: (120-123)/(60-74) 120/66  Resp:  [16-18] 16  SpO2:  [96 %-97 %] 96 %  O2 Device: None (Room air)    Body mass index is 27.26 kg/m².     Input and Output Summary (last 24 hours):     Intake/Output Summary (Last 24 hours) at 4/12/2025 1629  Last data filed at 4/12/2025 0948  Gross per 24 hour   Intake 660 ml   Output --   Net 660 ml         Physical Exam  Vitals and nursing note reviewed.   Constitutional:       General: He is not in acute distress.     Appearance: He is well-developed.   HENT:      Head: Normocephalic and atraumatic.   Cardiovascular:      Rate and Rhythm: Normal rate and regular rhythm.   Pulmonary:      Effort: No respiratory distress.      Breath sounds: Normal breath sounds. No wheezing or rhonchi.   Abdominal:      Palpations: Abdomen is soft.      Tenderness: There is no abdominal tenderness.   Musculoskeletal:         General: No swelling.      Cervical back: Neck supple.   Skin:     General: Skin is warm and dry.   Neurological:      Mental Status: He is alert. He is disoriented.           Lines/Drains:              Lab Results: I have reviewed the following results:   Results from last 7 days   Lab Units 04/12/25  1139   WBC Thousand/uL 7.40   HEMOGLOBIN g/dL 16.2   HEMATOCRIT % 46.2   PLATELETS Thousands/uL 186   SEGS PCT % 56   LYMPHO PCT % 34   MONO PCT % 7   EOS PCT % 2     Results from last 7 days   Lab  Units 04/11/25  0445   SODIUM mmol/L 140   POTASSIUM mmol/L 3.9   CHLORIDE mmol/L 103   CO2 mmol/L 30   BUN mg/dL 20   CREATININE mg/dL 0.94   ANION GAP mmol/L 7   CALCIUM mg/dL 9.3   GLUCOSE RANDOM mg/dL 111     Results from last 7 days   Lab Units 04/07/25  0922   INR  0.95     Results from last 7 days   Lab Units 04/12/25  1619 04/12/25  1051 04/12/25  0630 04/11/25  2033 04/11/25  1620 04/11/25  1107 04/11/25  0608 04/10/25  2037 04/10/25  1624 04/10/25  1125 04/10/25  0655 04/09/25  2108   POC GLUCOSE mg/dl 121 129 101 177* 116 213* 143* 159* 129 202* 110 138               Recent Cultures (last 7 days):   Results from last 7 days   Lab Units 04/07/25  1300   GRAM STAIN RESULT  Rare Mononuclear Cells  Rare Polys  No No organisms seen             Last 24 Hours Medication List:     Current Facility-Administered Medications:     acetaminophen (TYLENOL) tablet 650 mg, Q6H PRN    aluminum-magnesium hydroxide-simethicone (MAALOX) oral suspension 30 mL, Q4H PRN    atorvastatin (LIPITOR) tablet 20 mg, Daily    bisacodyl (DULCOLAX) rectal suppository 10 mg, Daily    Cholecalciferol (VITAMIN D3) tablet 2,000 Units, Daily    cyanocobalamin (VITAMIN B-12) tablet 1,000 mcg, Daily    heparin (porcine) subcutaneous injection 5,000 Units, Q12H CAIT    losartan (COZAAR) tablet 100 mg, Daily **AND** hydroCHLOROthiazide tablet 25 mg, Daily    insulin lispro (HumALOG/ADMELOG) 100 units/mL subcutaneous injection 1-5 Units, HS    insulin lispro (HumALOG/ADMELOG) 100 units/mL subcutaneous injection 1-6 Units, TID AC **AND** Fingerstick Glucose (POCT), TID AC    ondansetron (ZOFRAN) injection 4 mg, Q6H PRN    pantoprazole (PROTONIX) EC tablet 20 mg, Early Morning    polyethylene glycol (MIRALAX) packet 17 g, Daily    senna (SENOKOT) tablet 17.2 mg, HS    Administrative Statements   Today, Patient Was Seen By: Rosa Lockhart MD      **Please Note: This note may have been constructed using a voice recognition system.**

## 2025-04-12 NOTE — CONSULTS
Podiatry - Consultation    Patient Information:   Alexis Dennison 64 y.o. male MRN: [id number]  [facility] Encounter: [id number]  PCP: PATI Mckeon  Date of Admission:  3/29/2025  Date of Consultation: 04/12/25  Requesting Physician: Rosa Lockhart MD      ASSESSMENT:    Alexis Dennison is a 64 y.o. male with:    Hammertoe deformities left foot   Type II diabetes mellitus (HbA1c 6.6%)   Ambulatory dysfunction     PLAN:    Patient was seen and evaluated at bedside. Left foot evaluated, hammertoe contractures noted to left foot. No open lesions or concern for acute pathology.  No concern for cellulitis.   Outpatient follow up placed in patient's chart.   Podiatry is signing off at this time.   Elevation on green foam wedges or pillows when non-ambulatory  Rest of care per primary team.  Will discuss this plan with my attending and update as needed.    Weightbearing status: WBAT to the left foot     SUBJECTIVE:    History of Present Illness:    Alexis Dennison is a 64 y.o. male who is originally admitted 3/29/2025 due to worsening confusion in the setting of a brain mass. Patient has a past medical history of Brain tumor, type II diabetes mellitus, HTN, ambulatory dysfunction.    We are consulted for new onset of pain and redness to patients left foot. Patient reports he does not have pain to the left foot and does not recall why podiatry was consulted. Patient denies any other pedal concerns.     Patient denies nausea, vomiting, fever, chills, shortness of breath, chest pain.     Review of Systems:    Constitutional: Negative.    HENT: Negative.    Eyes: Negative.    Respiratory: Negative.    Cardiovascular: Negative.    Gastrointestinal: Negative.    Musculoskeletal: Hammertoes left foot   Skin: Negative     Neurological: Negative    Psych: Negative.     Past Medical and Surgical History:     Past Medical History:   Diagnosis Date    Colon polyp     Diabetes mellitus (HCC)     GERD (gastroesophageal reflux  "disease)     Hyperlipidemia     Hypertension     Liver disease     Sleep apnea        Past Surgical History:   Procedure Laterality Date    COLONOSCOPY      CYST REMOVAL      back    FL LUMBAR PUNCTURE DIAGNOSTIC  3/31/2025    FL LUMBAR PUNCTURE DIAGNOSTIC  4/7/2025    HI EXC B9 LESION MRGN XCP SK TG T/A/L 0.6-1.0 CM N/A 6/7/2023    Procedure: EXCISION  BIOPSY LESION/MASS ABDOMINAL/CHEST WALL;  Surgeon: Trevor Villavicencio MD;  Location:  MAIN OR;  Service: General       Meds/Allergies:      Medications Prior to Admission:     aspirin (ECOTRIN LOW STRENGTH) 81 mg EC tablet    atorvastatin (LIPITOR) 20 mg tablet    Cholecalciferol (Vitamin D) 50 MCG (2000 UT) CAPS    Cyanocobalamin (Vitamin B 12) 500 MCG TABS    losartan-hydrochlorothiazide (HYZAAR) 100-25 MG per tablet    metFORMIN (GLUCOPHAGE-XR) 500 mg 24 hr tablet    omeprazole (PriLOSEC) 20 mg delayed release capsule    SITagliptin-metFORMIN HCl ER (Janumet XR)  MG TB24    vitamin B-12 (VITAMIN B-12) 1,000 mcg tablet    No Known Allergies    Social History:     Marital Status: /Civil Union    Substance Use History:   Social History     Substance and Sexual Activity   Alcohol Use Yes    Alcohol/week: 1.0 standard drink of alcohol    Types: 1 Cans of beer per week    Comment: socially     Social History     Tobacco Use   Smoking Status Never   Smokeless Tobacco Never     Social History     Substance and Sexual Activity   Drug Use Never       Family History:    Family History   Problem Relation Age of Onset    Cancer Mother     Cancer Father     Diabetes Paternal Grandfather     Diabetes unspecified Paternal Grandfather         Type 2    Colon polyps Neg Hx     Colon cancer Neg Hx          OBJECTIVE:    Vitals:   Blood Pressure: 123/74 (04/12/25 0900)  Pulse: (!) 49 (04/12/25 0900)  Temperature: 98 °F (36.7 °C) (04/12/25 0719)  Temp Source: Oral (04/12/25 0719)  Respirations: 18 (04/12/25 0719)  Height: 5' 10\" (177.8 cm) (03/29/25 1828)  Weight - " Scale: 86.2 kg (190 lb) (03/29/25 1828)  SpO2: 97 % (04/12/25 0900)    Physical Exam:    General Appearance: Alert, cooperative, no distress.  HEENT: Head normocephalic, atraumatic, without obvious abnormality.  Heart: Normal rate and rhythm.  Lungs: Non-labored breathing. No respiratory distress.  Abdomen: Without distension.  Psychiatric: AAOx3  Lower Extremity:  Vascular:   Right DP and PT pulses are present. Left DP and PT pulses are present. CRT < 3 seconds at the digits. +0/4 edema noted at bilateral lower extremities. Pedal hair is present. Skin temperature is WNL bilaterally.    Musculoskeletal:  MMT is 5/5 in all muscle compartments bilaterally. ROM at the 1st MPJ and ankle joint are intact bilaterally with the leg extended. No on palpation of left foot. Hammertoe deformities noted to digits.     Dermatological:    Diffuse xerosis cutis  No open lesions     Neurological:  Gross sensation is intact. Protective sensation is intact. Patient Denies numbness and/or paresthesias.    Clinical Images 04/12/25:        Additional data:     Lab Results: I have personally reviewed pertinent labs including:    Results from last 7 days   Lab Units 04/11/25  0445   WBC Thousand/uL 6.73   HEMOGLOBIN g/dL 15.1   HEMATOCRIT % 44.7   PLATELETS Thousands/uL 188   SEGS PCT % 53   LYMPHO PCT % 38   MONO PCT % 7   EOS PCT % 2     Results from last 7 days   Lab Units 04/11/25  0445   POTASSIUM mmol/L 3.9   CHLORIDE mmol/L 103   CO2 mmol/L 30   BUN mg/dL 20   CREATININE mg/dL 0.94   CALCIUM mg/dL 9.3     Results from last 7 days   Lab Units 04/07/25  0922   INR  0.95       Cultures: I have personally reviewed pertinent cultures including:    Results from last 7 days   Lab Units 04/07/25  1300   GRAM STAIN RESULT  Rare Mononuclear Cells  Rare Polys  No No organisms seen           Imaging: I have personally reviewed pertinent reports in PACS.  EKG, Pathology, and Other Studies: I have personally reviewed pertinent reports.   "      ** Please Note: Portions of the record may have been created with voice recognition software. Occasional wrong word or \"sound a like\" substitutions may have occurred due to the inherent limitations of voice recognition software. Read the chart carefully and recognize, using context, where substitutions have occurred. **    "

## 2025-04-12 NOTE — PLAN OF CARE
Problem: PAIN - ADULT  Goal: Verbalizes/displays adequate comfort level or baseline comfort level  Description: Interventions:- Encourage patient to monitor pain and request assistance- Assess pain using appropriate pain scale- Administer analgesics based on type and severity of pain and evaluate response- Implement non-pharmacological measures as appropriate and evaluate response- Notify physician/advanced practitioner if interventions unsuccessful or patient reports new pain  Outcome: Progressing     Problem: INFECTION - ADULT  Goal: Absence or prevention of progression during hospitalization  Description: INTERVENTIONS:- Assess and monitor for signs and symptoms of infection- Monitor lab/diagnostic results- Monitor all insertion sites, i.e. indwelling lines, tubes, and drains- Addison appropriate cooling/warming therapies per order- Administer medications as ordered- Instruct and encourage patient and family to use good hand hygiene technique- Identify and instruct in appropriate isolation precautions for identified infection/condition  Outcome: Progressing     Problem: SAFETY ADULT  Goal: Patient will remain free of falls  Description: INTERVENTIONS:- Educate patient/family on patient safety including physical limitations- Instruct patient to call for assistance with activity - Consult OT/PT to assist with strengthening/mobility - Keep Call bell within reach- Keep bed low and locked with side rails adjusted as appropriate- Keep care items and personal belongings within reach- Initiate and maintain comfort rounds- Make Fall Risk Sign visible to staff- Offer Toileting every 2 Hours, in advance of need- Initiate/Maintain bed/chair alarm- Obtain necessary fall risk management equipment.- Apply yellow socks and bracelet for high fall risk patients- Consider moving patient to room near nurses station  Outcome: Progressing  Goal: Maintain or return to baseline ADL function  Description: INTERVENTIONS:-  Assess  patient's ability to carry out ADLs; assess patient's baseline for ADL function and identify physical deficits which impact ability to perform ADLs (bathing, care of mouth/teeth, toileting, grooming, dressing, etc.)- Assess/evaluate cause of self-care deficits - Assess range of motion- Assess patient's mobility; develop plan if impaired- Assess patient's need for assistive devices and provide as appropriate- Encourage maximum independence but intervene and supervise when necessary- Involve family in performance of ADLs- Assess for home care needs following discharge - Consider OT consult to assist with ADL evaluation and planning for discharge- Provide patient education as appropriate  Outcome: Progressing  Goal: Maintains/Returns to pre admission functional level  Description: INTERVENTIONS:- Perform AM-PAC 6 Click Basic Mobility/ Daily Activity assessment daily.- Set and communicate daily mobility goal to care team and patient/family/caregiver. - Collaborate with rehabilitation services on mobility goals if consulted- Reposition patient every 2 hours.- Dangle patient 3 times a day- Stand patient 3 times a day- Ambulate patient 3 times a day- Out of bed to chair 3 times a day - Out of bed for meals 3 times a day- Out of bed for toileting- Record patient progress and toleration of activity level   Outcome: Progressing     Problem: DISCHARGE PLANNING  Goal: Discharge to home or other facility with appropriate resources  Description: INTERVENTIONS:- Identify barriers to discharge w/patient and caregiver- Arrange for needed discharge resources and transportation as appropriate- Identify discharge learning needs (meds, wound care, etc.)- Refer to Case Management Department for coordinating discharge planning if the patient needs post-hospital services based on physician/advanced practitioner order or complex needs related to functional status, cognitive ability, or social support system  Outcome: Progressing      Problem: Knowledge Deficit  Goal: Patient/family/caregiver demonstrates understanding of disease process, treatment plan, medications, and discharge instructions  Description: Complete learning assessment and assess knowledge base.Interventions:- Provide teaching at level of understanding- Provide teaching via preferred learning methods  Outcome: Progressing     Problem: NEUROSENSORY - ADULT  Goal: Achieves stable or improved neurological status  Description: INTERVENTIONS- Monitor and report changes in neurological status- Monitor vital signs such as temperature, blood pressure, glucose, and any other labs ordered - Initiate measures to prevent increased intracranial pressure- Monitor for seizure activity and implement precautions if appropriate    Outcome: Progressing  Goal: Remains free of injury related to seizures activity  Description: INTERVENTIONS- Maintain airway, patient safety  and administer oxygen as ordered- Monitor patient for seizure activity, document and report duration and description of seizure to physician/advanced practitioner- If seizure occurs,  ensure patient safety during seizure- Reorient patient post seizure- Seizure pads on all 4 side rails- Instruct patient/family to notify RN of any seizure activity including if an aura is experienced- Instruct patient/family to call for assistance with activity based on nursing assessment- Administer anti-seizure medications if ordered  Outcome: Progressing  Goal: Achieves maximal functionality and self care  Description: INTERVENTIONS- Monitor swallowing and airway patency with patient fatigue and changes in neurological status- Encourage and assist patient to increase activity and self care. - Encourage visually impaired, hearing impaired and aphasic patients to use assistive/communication devices  Outcome: Progressing     Problem: METABOLIC, FLUID AND ELECTROLYTES - ADULT  Goal: Glucose maintained within target range  Description: INTERVENTIONS:-  Monitor Blood Glucose as ordered- Assess for signs and symptoms of hyperglycemia and hypoglycemia- Administer ordered medications to maintain glucose within target range- Assess nutritional intake and initiate nutrition service referral as needed  Outcome: Progressing     Problem: Prexisting or High Potential for Compromised Skin Integrity  Goal: Skin integrity is maintained or improved  Description: INTERVENTIONS:- Identify patients at risk for skin breakdown- Assess and monitor skin integrity- Assess and monitor nutrition and hydration status- Monitor labs - Assess for incontinence - Turn and reposition patient- Assist with mobility/ambulation- Relieve pressure over bony prominences- Avoid friction and shearing- Provide appropriate hygiene as needed including keeping skin clean and dry- Evaluate need for skin moisturizer/barrier cream- Collaborate with interdisciplinary team - Patient/family teaching- Consider wound care consult   Outcome: Progressing

## 2025-04-12 NOTE — ASSESSMENT & PLAN NOTE
Admitted 3/29 with worsening confusion since prior ED presentation and outpatient imaging  Seen at [facility] ED on 3/24 with 3 week history of altered mental status, memory difficulty  CTH 3/24 demonstrated multiple hyperdense ependyma/subependymal nodules  MRI brain 3/26 performed outpatient demonstrating multiple scattered foci of subependymal nodular enhancement with mild hydrocephalus, scattered nodular areas of enhancement in left anterior inferior basal ganglia dn foci of nodular enhancement along anteromedial aspect of left cerebral peduncle and left quirino  CTH 3/29 (this admission)  Stable subependymal nodules and left formamen of De Oliveira region hyperdense lesion with dilation of left lateral ventrical and minimal left to right midline shift  S/p LP 3/31  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   MRI Brain (4/11) showed progression of disease from 3/30 to 4/11 with interval enlargement of the dominant left anterior basal ganglionic mass with greater surrounding vasogenic edema and mass effect and persistent leptomeningeal and intraventricular seeding with obstructive hydrocephalus--current differential lymphoma versus high-grade glioma less likely metastasis  Discussed with DALI who is planning for biopsy on Monday

## 2025-04-12 NOTE — ASSESSMENT & PLAN NOTE
Noted erythema and warmth to all toes on the left foot this morning (4/12), patient is confused- unable to relay any history  Given the patient is a high risk for complication will consult podiatry for evaluation of suspected cellulitis etc.

## 2025-04-12 NOTE — ASSESSMENT & PLAN NOTE
"Patient with development of worsening confusion, recently seen at the [facility] ED 3/24/2025 with CT head at that time with finding of brain lesion for which MRI was advised to exclude cancerous etiology.     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  Lymphoma/leukemia panel nondiagnostic  CT head on 4/3 with interval increase in ventricular caliber concerning for worsening hydrocephalus  No indication for AED per neurosurgery  [facility]   Neurosurgery and oncology recommended repeat high-volume LP as previous was nondiagnostic  Post LP 4/7 which was again undiagnostic.   MRI of the brain from 4/11-\"Interval enlargement of the dominant left anterior basal ganglionic mass lesion with greater surrounding vasogenic edema and mass effect. 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 Differential considerations include lymphoma, multicentric high-grade glioma, and less likely metastasis\"  Plan for biopsy on Monday, 4/14 by neurosurgery   "

## 2025-04-13 ENCOUNTER — APPOINTMENT (INPATIENT)
Dept: RADIOLOGY | Facility: HOSPITAL | Age: 65
DRG: 820 | End: 2025-04-13
Payer: COMMERCIAL

## 2025-04-13 ENCOUNTER — APPOINTMENT (INPATIENT)
Dept: NEUROLOGY | Facility: CLINIC | Age: 65
DRG: 820 | End: 2025-04-13
Attending: PSYCHIATRY & NEUROLOGY
Payer: COMMERCIAL

## 2025-04-13 ENCOUNTER — ANESTHESIA EVENT (INPATIENT)
Dept: PERIOP | Facility: HOSPITAL | Age: 65
End: 2025-04-13
Payer: COMMERCIAL

## 2025-04-13 LAB
ANION GAP SERPL CALCULATED.3IONS-SCNC: 9 MMOL/L (ref 4–13)
APTT PPP: 29 SECONDS (ref 23–34)
ARTERIAL PATENCY WRIST A: YES
BASE EX.OXY STD BLDV CALC-SCNC: 72.9 % (ref 60–80)
BASE EXCESS BLDV CALC-SCNC: 0.8 MMOL/L
BASOPHILS # BLD AUTO: 0.03 THOUSANDS/ÂΜL (ref 0–0.1)
BASOPHILS # BLD MANUAL: 0.07 THOUSAND/UL (ref 0–0.1)
BASOPHILS NFR BLD AUTO: 0 % (ref 0–1)
BASOPHILS NFR MAR MANUAL: 1 % (ref 0–1)
BUN SERPL-MCNC: 16 MG/DL (ref 5–25)
CALCIUM SERPL-MCNC: 10.1 MG/DL (ref 8.4–10.2)
CHLORIDE SERPL-SCNC: 100 MMOL/L (ref 96–108)
CO2 SERPL-SCNC: 31 MMOL/L (ref 21–32)
CREAT SERPL-MCNC: 0.97 MG/DL (ref 0.6–1.3)
EOSINOPHIL # BLD AUTO: 0.12 THOUSAND/ÂΜL (ref 0–0.61)
EOSINOPHIL # BLD MANUAL: 0 THOUSAND/UL (ref 0–0.4)
EOSINOPHIL NFR BLD AUTO: 2 % (ref 0–6)
EOSINOPHIL NFR BLD MANUAL: 0 % (ref 0–6)
ERYTHROCYTE [DISTWIDTH] IN BLOOD BY AUTOMATED COUNT: 11.6 % (ref 11.6–15.1)
ERYTHROCYTE [DISTWIDTH] IN BLOOD BY AUTOMATED COUNT: 11.7 % (ref 11.6–15.1)
GFR SERPL CREATININE-BSD FRML MDRD: 81 ML/MIN/1.73SQ M
GLUCOSE SERPL-MCNC: 105 MG/DL (ref 65–140)
GLUCOSE SERPL-MCNC: 117 MG/DL (ref 65–140)
GLUCOSE SERPL-MCNC: 152 MG/DL (ref 65–140)
GLUCOSE SERPL-MCNC: 180 MG/DL (ref 65–140)
GLUCOSE SERPL-MCNC: 198 MG/DL (ref 65–140)
HCO3 BLDV-SCNC: 26.1 MMOL/L (ref 24–30)
HCT VFR BLD AUTO: 41.7 % (ref 36.5–49.3)
HCT VFR BLD AUTO: 48 % (ref 36.5–49.3)
HGB BLD-MCNC: 14.3 G/DL (ref 12–17)
HGB BLD-MCNC: 16.4 G/DL (ref 12–17)
IMM GRANULOCYTES # BLD AUTO: 0.02 THOUSAND/UL (ref 0–0.2)
IMM GRANULOCYTES NFR BLD AUTO: 0 % (ref 0–2)
INR PPP: 0.98 (ref 0.85–1.19)
LYMPHOCYTES # BLD AUTO: 0.58 THOUSAND/UL (ref 0.6–4.47)
LYMPHOCYTES # BLD AUTO: 2.55 THOUSANDS/ÂΜL (ref 0.6–4.47)
LYMPHOCYTES # BLD AUTO: 8 % (ref 14–44)
LYMPHOCYTES NFR BLD AUTO: 35 % (ref 14–44)
MCH RBC QN AUTO: 30.4 PG (ref 26.8–34.3)
MCH RBC QN AUTO: 30.9 PG (ref 26.8–34.3)
MCHC RBC AUTO-ENTMCNC: 34.2 G/DL (ref 31.4–37.4)
MCHC RBC AUTO-ENTMCNC: 34.3 G/DL (ref 31.4–37.4)
MCV RBC AUTO: 89 FL (ref 82–98)
MCV RBC AUTO: 90 FL (ref 82–98)
MONOCYTES # BLD AUTO: 0 THOUSAND/UL (ref 0–1.22)
MONOCYTES # BLD AUTO: 0.47 THOUSAND/ÂΜL (ref 0.17–1.22)
MONOCYTES NFR BLD AUTO: 7 % (ref 4–12)
MONOCYTES NFR BLD: 0 % (ref 4–12)
NEUTROPHILS # BLD AUTO: 4.03 THOUSANDS/ÂΜL (ref 1.85–7.62)
NEUTROPHILS # BLD MANUAL: 6.65 THOUSAND/UL (ref 1.85–7.62)
NEUTS SEG NFR BLD AUTO: 56 % (ref 43–75)
NEUTS SEG NFR BLD AUTO: 91 % (ref 43–75)
NON VENT ROOM AIR: 21 %
NRBC BLD AUTO-RTO: 0 /100 WBCS
O2 CT BLDV-SCNC: 16.6 ML/DL
PCO2 BLDV: 43.8 MM HG (ref 42–50)
PH BLDV: 7.39 [PH] (ref 7.3–7.4)
PLATELET # BLD AUTO: 175 THOUSANDS/UL (ref 149–390)
PLATELET # BLD AUTO: 194 THOUSANDS/UL (ref 149–390)
PLATELET BLD QL SMEAR: ADEQUATE
PMV BLD AUTO: 10.2 FL (ref 8.9–12.7)
PMV BLD AUTO: 10.3 FL (ref 8.9–12.7)
PO2 BLDV: 39.3 MM HG (ref 35–45)
POTASSIUM SERPL-SCNC: 4.5 MMOL/L (ref 3.5–5.3)
PROTHROMBIN TIME: 13.3 SECONDS (ref 12.3–15)
RBC # BLD AUTO: 4.63 MILLION/UL (ref 3.88–5.62)
RBC # BLD AUTO: 5.4 MILLION/UL (ref 3.88–5.62)
RBC MORPH BLD: NORMAL
SODIUM SERPL-SCNC: 140 MMOL/L (ref 135–147)
WBC # BLD AUTO: 7.22 THOUSAND/UL (ref 4.31–10.16)
WBC # BLD AUTO: 7.31 THOUSAND/UL (ref 4.31–10.16)

## 2025-04-13 PROCEDURE — 88108 CYTOPATH CONCENTRATE TECH: CPT | Performed by: STUDENT IN AN ORGANIZED HEALTH CARE EDUCATION/TRAINING PROGRAM

## 2025-04-13 PROCEDURE — 85007 BL SMEAR W/DIFF WBC COUNT: CPT | Performed by: PHYSICIAN ASSISTANT

## 2025-04-13 PROCEDURE — 82805 BLOOD GASES W/O2 SATURATION: CPT | Performed by: PSYCHIATRY & NEUROLOGY

## 2025-04-13 PROCEDURE — 85610 PROTHROMBIN TIME: CPT | Performed by: PHYSICIAN ASSISTANT

## 2025-04-13 PROCEDURE — 85027 COMPLETE CBC AUTOMATED: CPT | Performed by: PHYSICIAN ASSISTANT

## 2025-04-13 PROCEDURE — 88312 SPECIAL STAINS GROUP 1: CPT | Performed by: STUDENT IN AN ORGANIZED HEALTH CARE EDUCATION/TRAINING PROGRAM

## 2025-04-13 PROCEDURE — 85025 COMPLETE CBC W/AUTO DIFF WBC: CPT | Performed by: FAMILY MEDICINE

## 2025-04-13 PROCEDURE — 80048 BASIC METABOLIC PNL TOTAL CA: CPT | Performed by: FAMILY MEDICINE

## 2025-04-13 PROCEDURE — 5A1935Z RESPIRATORY VENTILATION, LESS THAN 24 CONSECUTIVE HOURS: ICD-10-PCS | Performed by: PSYCHIATRY & NEUROLOGY

## 2025-04-13 PROCEDURE — 70450 CT HEAD/BRAIN W/O DYE: CPT

## 2025-04-13 PROCEDURE — 71045 X-RAY EXAM CHEST 1 VIEW: CPT

## 2025-04-13 PROCEDURE — 85730 THROMBOPLASTIN TIME PARTIAL: CPT | Performed by: PHYSICIAN ASSISTANT

## 2025-04-13 PROCEDURE — 94760 N-INVAS EAR/PLS OXIMETRY 1: CPT

## 2025-04-13 PROCEDURE — 99232 SBSQ HOSP IP/OBS MODERATE 35: CPT | Performed by: FAMILY MEDICINE

## 2025-04-13 PROCEDURE — NC001 PR NO CHARGE: Performed by: PSYCHIATRY & NEUROLOGY

## 2025-04-13 PROCEDURE — 88342 IMHCHEM/IMCYTCHM 1ST ANTB: CPT | Performed by: STUDENT IN AN ORGANIZED HEALTH CARE EDUCATION/TRAINING PROGRAM

## 2025-04-13 PROCEDURE — 82948 REAGENT STRIP/BLOOD GLUCOSE: CPT

## 2025-04-13 PROCEDURE — 0BH17EZ INSERTION OF ENDOTRACHEAL AIRWAY INTO TRACHEA, VIA NATURAL OR ARTIFICIAL OPENING: ICD-10-PCS | Performed by: PSYCHIATRY & NEUROLOGY

## 2025-04-13 PROCEDURE — 88184 FLOWCYTOMETRY/ TC 1 MARKER: CPT | Performed by: STUDENT IN AN ORGANIZED HEALTH CARE EDUCATION/TRAINING PROGRAM

## 2025-04-13 PROCEDURE — 61107 TDH PNXR IMPLT VENTR CATH: CPT | Performed by: STUDENT IN AN ORGANIZED HEALTH CARE EDUCATION/TRAINING PROGRAM

## 2025-04-13 PROCEDURE — 88185 FLOWCYTOMETRY/TC ADD-ON: CPT | Performed by: STUDENT IN AN ORGANIZED HEALTH CARE EDUCATION/TRAINING PROGRAM

## 2025-04-13 PROCEDURE — 31500 INSERT EMERGENCY AIRWAY: CPT | Performed by: PSYCHIATRY & NEUROLOGY

## 2025-04-13 PROCEDURE — 99233 SBSQ HOSP IP/OBS HIGH 50: CPT | Performed by: INTERNAL MEDICINE

## 2025-04-13 PROCEDURE — 94002 VENT MGMT INPAT INIT DAY: CPT

## 2025-04-13 PROCEDURE — 99291 CRITICAL CARE FIRST HOUR: CPT | Performed by: PSYCHIATRY & NEUROLOGY

## 2025-04-13 PROCEDURE — 009630Z DRAINAGE OF CEREBRAL VENTRICLE WITH DRAINAGE DEVICE, PERCUTANEOUS APPROACH: ICD-10-PCS | Performed by: STUDENT IN AN ORGANIZED HEALTH CARE EDUCATION/TRAINING PROGRAM

## 2025-04-13 RX ORDER — INSULIN LISPRO 100 [IU]/ML
1-6 INJECTION, SOLUTION INTRAVENOUS; SUBCUTANEOUS EVERY 6 HOURS
Status: DISCONTINUED | OUTPATIENT
Start: 2025-04-13 | End: 2025-04-17

## 2025-04-13 RX ORDER — ALBUMIN HUMAN 50 G/1000ML
SOLUTION INTRAVENOUS
Status: COMPLETED
Start: 2025-04-13 | End: 2025-04-13

## 2025-04-13 RX ORDER — FENTANYL CITRATE 50 UG/ML
100 INJECTION, SOLUTION INTRAMUSCULAR; INTRAVENOUS ONCE
Refills: 0 | Status: COMPLETED | OUTPATIENT
Start: 2025-04-13 | End: 2025-04-13

## 2025-04-13 RX ORDER — PROPOFOL 10 MG/ML
5-50 INJECTION, EMULSION INTRAVENOUS
Status: DISCONTINUED | OUTPATIENT
Start: 2025-04-13 | End: 2025-04-14

## 2025-04-13 RX ORDER — ALBUMIN HUMAN 50 G/1000ML
12.5 SOLUTION INTRAVENOUS ONCE
Status: COMPLETED | OUTPATIENT
Start: 2025-04-13 | End: 2025-04-13

## 2025-04-13 RX ORDER — PANTOPRAZOLE SODIUM 40 MG/10ML
40 INJECTION, POWDER, LYOPHILIZED, FOR SOLUTION INTRAVENOUS
Status: DISCONTINUED | OUTPATIENT
Start: 2025-04-14 | End: 2025-04-14

## 2025-04-13 RX ORDER — DEXAMETHASONE SODIUM PHOSPHATE 10 MG/ML
10 INJECTION, SOLUTION INTRAMUSCULAR; INTRAVENOUS ONCE
Status: COMPLETED | OUTPATIENT
Start: 2025-04-13 | End: 2025-04-13

## 2025-04-13 RX ORDER — DEXAMETHASONE SODIUM PHOSPHATE 4 MG/ML
4 INJECTION, SOLUTION INTRA-ARTICULAR; INTRALESIONAL; INTRAMUSCULAR; INTRAVENOUS; SOFT TISSUE EVERY 6 HOURS SCHEDULED
Status: DISCONTINUED | OUTPATIENT
Start: 2025-04-13 | End: 2025-04-13

## 2025-04-13 RX ORDER — CHLORHEXIDINE GLUCONATE ORAL RINSE 1.2 MG/ML
15 SOLUTION DENTAL EVERY 12 HOURS SCHEDULED
Status: DISCONTINUED | OUTPATIENT
Start: 2025-04-13 | End: 2025-05-07 | Stop reason: HOSPADM

## 2025-04-13 RX ORDER — SODIUM CHLORIDE, SODIUM GLUCONATE, SODIUM ACETATE, POTASSIUM CHLORIDE, MAGNESIUM CHLORIDE, SODIUM PHOSPHATE, DIBASIC, AND POTASSIUM PHOSPHATE .53; .5; .37; .037; .03; .012; .00082 G/100ML; G/100ML; G/100ML; G/100ML; G/100ML; G/100ML; G/100ML
75 INJECTION, SOLUTION INTRAVENOUS CONTINUOUS
Status: DISCONTINUED | OUTPATIENT
Start: 2025-04-13 | End: 2025-04-14

## 2025-04-13 RX ORDER — ROCURONIUM BROMIDE 10 MG/ML
1 INJECTION, SOLUTION INTRAVENOUS ONCE
Status: COMPLETED | OUTPATIENT
Start: 2025-04-13 | End: 2025-04-13

## 2025-04-13 RX ORDER — PROPOFOL 10 MG/ML
100 INJECTION, EMULSION INTRAVENOUS ONCE
Status: COMPLETED | OUTPATIENT
Start: 2025-04-13 | End: 2025-04-13

## 2025-04-13 RX ORDER — LIDOCAINE HYDROCHLORIDE AND EPINEPHRINE 10; 10 MG/ML; UG/ML
INJECTION, SOLUTION INFILTRATION; PERINEURAL
Status: COMPLETED
Start: 2025-04-13 | End: 2025-04-13

## 2025-04-13 RX ORDER — FENTANYL CITRATE 50 UG/ML
INJECTION, SOLUTION INTRAMUSCULAR; INTRAVENOUS
Status: COMPLETED
Start: 2025-04-13 | End: 2025-04-13

## 2025-04-13 RX ADMIN — CHLORHEXIDINE GLUCONATE 0.12% ORAL RINSE 15 ML: 1.2 LIQUID ORAL at 21:01

## 2025-04-13 RX ADMIN — ROCURONIUM 86 MG: 50 INJECTION, SOLUTION INTRAVENOUS at 14:13

## 2025-04-13 RX ADMIN — ALBUMIN HUMAN 12.5 G: 50 SOLUTION INTRAVENOUS at 14:27

## 2025-04-13 RX ADMIN — HEPARIN SODIUM 5000 UNITS: 5000 INJECTION INTRAVENOUS; SUBCUTANEOUS at 09:41

## 2025-04-13 RX ADMIN — FENTANYL CITRATE 100 MCG: 50 INJECTION INTRAMUSCULAR; INTRAVENOUS at 15:52

## 2025-04-13 RX ADMIN — INSULIN LISPRO 1 UNITS: 100 INJECTION, SOLUTION INTRAVENOUS; SUBCUTANEOUS at 23:04

## 2025-04-13 RX ADMIN — LIDOCAINE HYDROCHLORIDE,EPINEPHRINE BITARTRATE 20 ML: 10; .01 INJECTION, SOLUTION INFILTRATION; PERINEURAL at 14:48

## 2025-04-13 RX ADMIN — ATORVASTATIN CALCIUM 20 MG: 20 TABLET, FILM COATED ORAL at 09:36

## 2025-04-13 RX ADMIN — SODIUM CHLORIDE, SODIUM GLUCONATE, SODIUM ACETATE, POTASSIUM CHLORIDE, MAGNESIUM CHLORIDE, SODIUM PHOSPHATE, DIBASIC, AND POTASSIUM PHOSPHATE 75 ML/HR: .53; .5; .37; .037; .03; .012; .00082 INJECTION, SOLUTION INTRAVENOUS at 14:46

## 2025-04-13 RX ADMIN — PROPOFOL 40 MCG/KG/MIN: 10 INJECTION, EMULSION INTRAVENOUS at 21:30

## 2025-04-13 RX ADMIN — ALBUMIN (HUMAN) 12.5 G: 12.5 INJECTION, SOLUTION INTRAVENOUS at 14:27

## 2025-04-13 RX ADMIN — INSULIN LISPRO 2 UNITS: 100 INJECTION, SOLUTION INTRAVENOUS; SUBCUTANEOUS at 19:48

## 2025-04-13 RX ADMIN — DEXAMETHASONE SODIUM PHOSPHATE 10 MG: 10 INJECTION, SOLUTION INTRAMUSCULAR; INTRAVENOUS at 12:50

## 2025-04-13 RX ADMIN — PROPOFOL 50 MCG/KG/MIN: 10 INJECTION, EMULSION INTRAVENOUS at 14:27

## 2025-04-13 RX ADMIN — CYANOCOBALAMIN TAB 500 MCG 1000 MCG: 500 TAB at 09:35

## 2025-04-13 RX ADMIN — HYDROCHLOROTHIAZIDE 25 MG: 25 TABLET ORAL at 09:36

## 2025-04-13 RX ADMIN — CHLORHEXIDINE GLUCONATE 0.12% ORAL RINSE 15 ML: 1.2 LIQUID ORAL at 14:48

## 2025-04-13 RX ADMIN — FENTANYL CITRATE 100 MCG: 50 INJECTION INTRAMUSCULAR; INTRAVENOUS at 14:26

## 2025-04-13 RX ADMIN — LOSARTAN POTASSIUM 100 MG: 50 TABLET, FILM COATED ORAL at 09:36

## 2025-04-13 RX ADMIN — PROPOFOL 40 MCG/KG/MIN: 10 INJECTION, EMULSION INTRAVENOUS at 16:49

## 2025-04-13 RX ADMIN — FENTANYL CITRATE 100 MCG: 50 INJECTION, SOLUTION INTRAMUSCULAR; INTRAVENOUS at 15:52

## 2025-04-13 RX ADMIN — Medication 2000 UNITS: at 09:35

## 2025-04-13 RX ADMIN — PROPOFOL 100 MG: 10 INJECTION, EMULSION INTRAVENOUS at 14:27

## 2025-04-13 RX ADMIN — PANTOPRAZOLE SODIUM 20 MG: 20 TABLET, DELAYED RELEASE ORAL at 05:41

## 2025-04-13 RX ADMIN — POLYETHYLENE GLYCOL 3350 17 G: 17 POWDER, FOR SOLUTION ORAL at 09:35

## 2025-04-13 NOTE — PROGRESS NOTES
"Progress Note - Hospitalist   Name: Alexis Dennison 64 y.o. male I MRN: [id number]  Unit/Bed#: [facility] 721-01 I Date of Admission: 3/29/2025   Date of Service: 4/13/2025 I Hospital Day: 15    Assessment & Plan  Brain tumor (HCC)  Patient with development of worsening confusion, recently seen at the [facility] ED 3/24/2025 with CT head at that time with finding of brain lesion for which MRI was advised to exclude cancerous etiology.     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  Lymphoma/leukemia panel nondiagnostic  CT head on 4/3 with interval increase in ventricular caliber concerning for worsening hydrocephalus  No indication for AED per neurosurgery and holding off steroids until Dx achieved  [facility]   Neurosurgery and oncology recommended repeat high-volume LP as previous was nondiagnostic  Post LP 4/7 which was again undiagnostic.   MRI of the brain from 4/11-\"Interval enlargement of the dominant left anterior basal ganglionic mass lesion with greater surrounding vasogenic edema and mass effect. 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 Differential considerations include lymphoma, multicentric high-grade glioma, and less likely metastasis\"  Plan for biopsy on Monday, 4/14 by neurosurgery   Patient is more lethargic on the morning of 4/13; neurocritical care consulted, plan for stat CT head, video " EEG, continue neurochecks; initiated Decadron  Encephalopathy  Worsening, patient is more lethargic today  Due to brain mass as above  Patient with acute onset confusion, forgetting names/places, oriented to self only most of the times  Delirium precautions  CT on 4/3 with findings concerning of worsening hydrocephalus  Type 2 diabetes mellitus with hyperglycemia, without long-term current use of insulin (HCC)  Has been well-controlled as outpatient with hemoglobin A1c of 6.6%  Hold home oral medications, start correctional insulin coverage   Accu-Cheks reviewed and acceptable  Continue hypoglycemia protocol and carb controlled diet  Mixed hyperlipidemia  Continue statin  HTN, goal below 130/80  Continue losartan and hydrochlorothiazide  BP reviewed and acceptable  Liver lesion  MRI obtained: No suspicious hepatic lesions.  There is a simple right hepatic lobe cyst.  Mild diffuse hepatic steatosis.  LFTs stable  Outpatient follow up advised  Thyroid nodule  Incidental findings on CT scan  Nonemergent thyroid ultrasound for follow-up in the outpatient setting  Pulmonary nodule  Imaging with 4 mm right lower lobe pulmonary nodule.  CT chest 3-month follow-up  Constipation  Continue senna, MiraLAX and suppository  Ambulate patient as able  Foot erythema  Resolved  Noted erythema and warmth to all toes on the left foot this morning (4/12), patient is confused- unable to relay any history  Given the patient is a high risk for complication will consult podiatry for evaluation of suspected cellulitis etc.-noted hammertoe deformities, no acute pathology  Ambulatory dysfunction  PT/OT recommending level 2 at discharge  Awaiting medically stability  Ambulate patient as able  Fall precautions    VTE Pharmacologic Prophylaxis: VTE Score: 5 High Risk (Score >/= 5) - Pharmacological DVT Prophylaxis Ordered: enoxaparin (Lovenox). Sequential Compression Devices Ordered.    Mobility:   Basic Mobility Inpatient Raw Score: 18  JH-M  Goal: 6: Walk 10 steps or more  JH-HLM Achieved: 2: Bed activities/Dependent transfer  JH-HLM Goal achieved. Continue to encourage appropriate mobility.    Patient Centered Rounds: I performed bedside rounds with nursing staff today.   Discussions with Specialists or Other Care Team Provider: RN, case management, critical care, oncology, neurosurgery    Education and Discussions with Family / Patient: Updated  (wife) at bedside and daughter, Elizabeth over the phone.    Current Length of Stay: 15 day(s)  Current Patient Status: Inpatient   Certification Statement: The patient will continue to require additional inpatient hospital stay due to pending LP results  Discharge Plan: Anticipate discharge in >72 hrs to rehab facility.    Code Status: Level 1 - Full Code    Subjective   Patient seen and examined,very lethargic, opens eyes to loud voice, but does not follow commands    Objective :  Temp:  [97.9 °F (36.6 °C)-98.2 °F (36.8 °C)] 98.2 °F (36.8 °C)  HR:  [49-58] 53  BP: (111-135)/(61-77) 130/77  Resp:  [14-16] 14  SpO2:  [90 %-98 %] 97 %  O2 Device: None (Room air)    Body mass index is 27.26 kg/m².     Input and Output Summary (last 24 hours):   No intake or output data in the 24 hours ending 04/13/25 1314        Physical Exam  Vitals and nursing note reviewed.   Constitutional:       General: He is not in acute distress.     Appearance: He is well-developed.      Comments: lethargic   HENT:      Head: Normocephalic and atraumatic.   Cardiovascular:      Rate and Rhythm: Normal rate and regular rhythm.   Pulmonary:      Effort: No respiratory distress.      Breath sounds: Normal breath sounds. No wheezing or rhonchi.   Abdominal:      Palpations: Abdomen is soft.      Tenderness: There is no abdominal tenderness.   Musculoskeletal:         General: No swelling.      Cervical back: Neck supple.   Skin:     General: Skin is warm and dry.   Neurological:      Mental Status: He is lethargic.      Comments:  Does not follow commands           Lines/Drains:              Lab Results: I have reviewed the following results:   Results from last 7 days   Lab Units 04/13/25  0510   WBC Thousand/uL 7.22   HEMOGLOBIN g/dL 16.4   HEMATOCRIT % 48.0   PLATELETS Thousands/uL 194   SEGS PCT % 56   LYMPHO PCT % 35   MONO PCT % 7   EOS PCT % 2     Results from last 7 days   Lab Units 04/13/25  0510   SODIUM mmol/L 140   POTASSIUM mmol/L 4.5   CHLORIDE mmol/L 100   CO2 mmol/L 31   BUN mg/dL 16   CREATININE mg/dL 0.97   ANION GAP mmol/L 9   CALCIUM mg/dL 10.1   GLUCOSE RANDOM mg/dL 117     Results from last 7 days   Lab Units 04/07/25  0922   INR  0.95     Results from last 7 days   Lab Units 04/13/25  1119 04/13/25  0638 04/12/25  2036 04/12/25  1619 04/12/25  1051 04/12/25  0630 04/11/25  2033 04/11/25  1620 04/11/25  1107 04/11/25  0608 04/10/25  2037 04/10/25  1624   POC GLUCOSE mg/dl 152* 105 142* 121 129 101 177* 116 213* 143* 159* 129               Recent Cultures (last 7 days):   Results from last 7 days   Lab Units 04/07/25  1300   GRAM STAIN RESULT  Rare Mononuclear Cells  Rare Polys  No No organisms seen             Last 24 Hours Medication List:     Current Facility-Administered Medications:     acetaminophen (TYLENOL) tablet 650 mg, Q6H PRN    aluminum-magnesium hydroxide-simethicone (MAALOX) oral suspension 30 mL, Q4H PRN    atorvastatin (LIPITOR) tablet 20 mg, Daily    bisacodyl (DULCOLAX) rectal suppository 10 mg, Daily    Cholecalciferol (VITAMIN D3) tablet 2,000 Units, Daily    cyanocobalamin (VITAMIN B-12) tablet 1,000 mcg, Daily    dexamethasone (DECADRON) injection 4 mg, Q6H CAIT    heparin (porcine) subcutaneous injection 5,000 Units, Q12H CAIT    losartan (COZAAR) tablet 100 mg, Daily **AND** hydroCHLOROthiazide tablet 25 mg, Daily    insulin lispro (HumALOG/ADMELOG) 100 units/mL subcutaneous injection 1-5 Units, HS    insulin lispro (HumALOG/ADMELOG) 100 units/mL subcutaneous injection 1-6 Units, TID AC **AND**  Fingerstick Glucose (POCT), TID AC    ondansetron (ZOFRAN) injection 4 mg, Q6H PRN    pantoprazole (PROTONIX) EC tablet 20 mg, Early Morning    polyethylene glycol (MIRALAX) packet 17 g, Daily    senna (SENOKOT) tablet 17.2 mg, HS    Administrative Statements   Today, Patient Was Seen By: Rosa Lockhart MD      **Please Note: This note may have been constructed using a voice recognition system.**

## 2025-04-13 NOTE — PROCEDURES
Intubation    Date/Time: 4/13/2025 2:32 PM    Performed by: Moncho Thorne DO  Authorized by: Moncho Thorne DO    Patient location:  ED  Other Assisting Provider: Yes (comment)    Consent:     Consent obtained:  Emergent situation  Universal protocol:     Test results available and properly labeled: yes      Radiology Images displayed and confirmed.  If images not available, report reviewed: yes      Immediately prior to procedure, a time out was called: yes      Patient identity confirmed:  Arm band and hospital-assigned identification number  Pre-procedure details:     Patient status:  Altered mental status    Mallampati score:  2    Pretreatment medications:  Fentanyl and propofol    Paralytics:  Rocuronium  Indications:     Indications for intubation: airway protection    Procedure details:     Preoxygenation:  None    CPR in progress: no      Intubation method:  Oral    Oral intubation technique:  Glidescope    Laryngoscope blade:  Mac 3    Tube size (mm):  7.5    Tube type:  Cuffed    Number of attempts:  1    Ventilation between attempts: no      Cricoid pressure: yes      Tube visualized through cords: yes    Placement assessment:     ETT to lip:  22    ETT to teeth:  21    Tube secured with:  ETT che    Breath sounds:  Equal    Placement verification: chest rise, condensation, colorimetric ETCO2 device, CXR verification, direct visualization and equal breath sounds      CXR findings:  ETT in proper place    Ventilator settings:  Ac/VC  Post-procedure details:     Patient tolerance of procedure:  Tolerated well, no immediate complications

## 2025-04-13 NOTE — PLAN OF CARE
Problem: PAIN - ADULT  Goal: Verbalizes/displays adequate comfort level or baseline comfort level  Description: Interventions:- Encourage patient to monitor pain and request assistance- Assess pain using appropriate pain scale- Administer analgesics based on type and severity of pain and evaluate response- Implement non-pharmacological measures as appropriate and evaluate response- Notify physician/advanced practitioner if interventions unsuccessful or patient reports new pain  Outcome: Progressing     Problem: INFECTION - ADULT  Goal: Absence or prevention of progression during hospitalization  Description: INTERVENTIONS:- Assess and monitor for signs and symptoms of infection- Monitor lab/diagnostic results- Monitor all insertion sites, i.e. indwelling lines, tubes, and drains- [place] appropriate cooling/warming therapies per order- Administer medications as ordered- Instruct and encourage patient and family to use good hand hygiene technique- Identify and instruct in appropriate isolation precautions for identified infection/condition  Outcome: Progressing     Problem: SAFETY ADULT  Goal: Patient will remain free of falls  Description: INTERVENTIONS:- Educate patient/family on patient safety including physical limitations- Instruct patient to call for assistance with activity - Consult OT/PT to assist with strengthening/mobility - Keep Call bell within reach- Keep bed low and locked with side rails adjusted as appropriate- Keep care items and personal belongings within reach- Initiate and maintain comfort rounds- Make Fall Risk Sign visible to staff- Offer Toileting every 2 Hours, in advance of need- Initiate/Maintain bed/chair alarm- Obtain necessary fall risk management equipment.- Apply yellow socks and bracelet for high fall risk patients- Consider moving patient to room near nurses station  Outcome: Progressing  Goal: Maintain or return to baseline ADL function  Description: INTERVENTIONS:-  Assess  patient's ability to carry out ADLs; assess patient's baseline for ADL function and identify physical deficits which impact ability to perform ADLs (bathing, care of mouth/teeth, toileting, grooming, dressing, etc.)- Assess/evaluate cause of self-care deficits - Assess range of motion- Assess patient's mobility; develop plan if impaired- Assess patient's need for assistive devices and provide as appropriate- Encourage maximum independence but intervene and supervise when necessary- Involve family in performance of ADLs- Assess for home care needs following discharge - Consider OT consult to assist with ADL evaluation and planning for discharge- Provide patient education as appropriate  Outcome: Progressing  Goal: Maintains/Returns to pre admission functional level  Description: INTERVENTIONS:- Perform AM-PAC 6 Click Basic Mobility/ Daily Activity assessment daily.- Set and communicate daily mobility goal to care team and patient/family/caregiver. - Collaborate with rehabilitation services on mobility goals if consulted- Reposition patient every 2 hours.- Dangle patient 3 times a day- Stand patient 3 times a day- Ambulate patient 3 times a day- Out of bed to chair 3 times a day - Out of bed for meals 3 times a day- Out of bed for toileting- Record patient progress and toleration of activity level   Outcome: Progressing     Problem: DISCHARGE PLANNING  Goal: Discharge to home or other facility with appropriate resources  Description: INTERVENTIONS:- Identify barriers to discharge w/patient and caregiver- Arrange for needed discharge resources and transportation as appropriate- Identify discharge learning needs (meds, wound care, etc.)- Refer to Case Management Department for coordinating discharge planning if the patient needs post-hospital services based on physician/advanced practitioner order or complex needs related to functional status, cognitive ability, or social support system  Outcome: Progressing      Problem: Knowledge Deficit  Goal: Patient/family/caregiver demonstrates understanding of disease process, treatment plan, medications, and discharge instructions  Description: Complete learning assessment and assess knowledge base.Interventions:- Provide teaching at level of understanding- Provide teaching via preferred learning methods  Outcome: Progressing     Problem: NEUROSENSORY - ADULT  Goal: Achieves stable or improved neurological status  Description: INTERVENTIONS- Monitor and report changes in neurological status- Monitor vital signs such as temperature, blood pressure, glucose, and any other labs ordered - Initiate measures to prevent increased intracranial pressure- Monitor for seizure activity and implement precautions if appropriate    Outcome: Progressing  Goal: Remains free of injury related to seizures activity  Description: INTERVENTIONS- Maintain airway, patient safety  and administer oxygen as ordered- Monitor patient for seizure activity, document and report duration and description of seizure to physician/advanced practitioner- If seizure occurs,  ensure patient safety during seizure- Reorient patient post seizure- Seizure pads on all 4 side rails- Instruct patient/family to notify RN of any seizure activity including if an aura is experienced- Instruct patient/family to call for assistance with activity based on nursing assessment- Administer anti-seizure medications if ordered  Outcome: Progressing  Goal: Achieves maximal functionality and self care  Description: INTERVENTIONS- Monitor swallowing and airway patency with patient fatigue and changes in neurological status- Encourage and assist patient to increase activity and self care. - Encourage visually impaired, hearing impaired and aphasic patients to use assistive/communication devices  Outcome: Progressing     Problem: METABOLIC, FLUID AND ELECTROLYTES - ADULT  Goal: Glucose maintained within target range  Description: INTERVENTIONS:-  Monitor Blood Glucose as ordered- Assess for signs and symptoms of hyperglycemia and hypoglycemia- Administer ordered medications to maintain glucose within target range- Assess nutritional intake and initiate nutrition service referral as needed  Outcome: Progressing     Problem: Prexisting or High Potential for Compromised Skin Integrity  Goal: Skin integrity is maintained or improved  Description: INTERVENTIONS:- Identify patients at risk for skin breakdown- Assess and monitor skin integrity- Assess and monitor nutrition and hydration status- Monitor labs - Assess for incontinence - Turn and reposition patient- Assist with mobility/ambulation- Relieve pressure over bony prominences- Avoid friction and shearing- Provide appropriate hygiene as needed including keeping skin clean and dry- Evaluate need for skin moisturizer/barrier cream- Collaborate with interdisciplinary team - Patient/family teaching- Consider wound care consult   Outcome: Progressing

## 2025-04-13 NOTE — ASSESSMENT & PLAN NOTE
Worsening, patient is more lethargic today  Due to brain mass as above  Patient with acute onset confusion, forgetting names/places, oriented to self only most of the times  Delirium precautions  CT on 4/3 with findings concerning of worsening hydrocephalus

## 2025-04-13 NOTE — RESPIRATORY THERAPY NOTE
RT Protocol Note  Alexis Dennison 64 y.o. male MRN: [id number]  Unit/Bed#: ICU 03 Encounter: [id number]    Assessment    Principal Problem:    Brain tumor (HCC)  Active Problems:    Type 2 diabetes mellitus with hyperglycemia, without long-term current use of insulin (HCC)    HTN, goal below 130/80    Mixed hyperlipidemia    Thyroid nodule    Pulmonary nodule    Liver lesion    Constipation    Ambulatory dysfunction    Encephalopathy    Foot erythema      Home Pulmonary Medications:  NA       Past Medical History:   Diagnosis Date    Colon polyp     Diabetes mellitus (HCC)     GERD (gastroesophageal reflux disease)     Hyperlipidemia     Hypertension     Liver disease     Sleep apnea      Social History     Socioeconomic History    Marital status: /Civil Union     Spouse name: None    Number of children: None    Years of education: None    Highest education level: None   Occupational History    None   Tobacco Use    Smoking status: Never    Smokeless tobacco: Never   Vaping Use    Vaping status: Never Used   Substance and Sexual Activity    Alcohol use: Yes     Alcohol/week: 1.0 standard drink of alcohol     Types: 1 Cans of beer per week     Comment: socially    Drug use: Never    Sexual activity: Not Currently     Partners: Female   Other Topics Concern    None   Social History Narrative    None     Social Drivers of Health     Financial Resource Strain: Not on file   Food Insecurity: Patient Unable To Answer (4/3/2025)    Nursing - Inadequate Food Risk Classification     Worried About Running Out of Food in the Last Year: Not on file     Ran Out of Food in the Last Year: Not on file     Ran Out of Food in the Last Year: Patient unable to answer   Transportation Needs: Patient Unable To Answer (4/3/2025)    Nursing - Transportation Risk Classification     Lack of Transportation: Not on file     Lack of Transportation: Patient unable to answer   Physical Activity: Not on file   Stress: Not on file   Social  "Connections: Not on file   Intimate Partner Violence: Patient Unable To Answer (4/3/2025)    Nursing IPS     Feels Physically and Emotionally Safe: Not on file     Physically Hurt by Someone: Not on file     Humiliated or Emotionally Abused by Someone: Not on file     Physically Hurt by Someone: Patient unable to answer     Hurt or Threatened by Someone: Patient unable to answer   Housing Stability: Patient Unable To Answer (4/3/2025)    Nursing: Inadequate Housing Risk Classification     Has Housing: Not on file     Worried About Losing Housing: Not on file     Unable to Get Utilities: Not on file     Unable to Pay for Housing in the Last Year: Patient unable to answer     Has Housin       Subjective         Objective    Physical Exam:   Assessment Type: Assess only  General Appearance: Sedated  Respiratory Pattern: Normal  Chest Assessment: Chest expansion symmetrical  Bilateral Breath Sounds: Clear    Vitals:  Blood pressure 114/64, pulse 62, temperature 98.2 °F (36.8 °C), resp. rate 21, height 5' 10\" (1.778 m), weight 86.2 kg (190 lb), SpO2 98%.    Results from last 7 days   Lab Units 25  1257   SHIRLEY TEST  Yes   NON VENT ROOM AIR % 21       Imaging and other studies:           Plan    Respiratory Plan: Vent/NIV/HFNC        Resp Comments: Pt assessed for RT protocol.  He is currently admitted w/ a [facility].  He is intubated and on vent settings as documented.  No existing pulm hx, RA @ home.  Will continue protocol as pt is intubated @ this time.   "

## 2025-04-13 NOTE — ASSESSMENT & PLAN NOTE
INTERIM ASSESSMENT APRIL 13, 2025:  The patient has some deterioration of his neurologic status with more somnolence.  A noncontrast CT scan of the head obtained earlier today showed predominantly left sided obstructive hydrocephalus from the lesion located adjacent to the foramen of [place], not significantly changed compared to April 3, 2025. Blood products within the occipital horn of the left lateral ventricle was similar compared to MRI of the brain from April 11 2025. The left anterior basal ganglia mass with surrounding edema, regional mass effect, and effacement of the left suprasellar cistern, appeared similar to MRI of the brain from April 11 2025.      I discussed these findings with the primary internal medicine team.  The patient may be transferred to the intensive care unit for close monitoring of neurologic status.  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  I advised the primary team to get in contact with the neurosurgery team to obtain additional recommendations, specifically about neurosurgical incisional/excisional biopsy of the left anterior based on ganglia mass.  Also, because of high suspicion of a primary CNS lymphoma, treatment with systemic steroids for brain edema may result in a none-diagnostic left basal ganglia mass biopsy.  The patient needs to have a neurosurgical incisional/excisional biopsy of the left basal ganglia mass as soon as possible.  We will continue to closely follow the patient with the primary team.  Please, call us with additional questions as needed.    Prior Clinical Summary:  Admitted 3/29 with worsening confusion since prior ED presentation and outpatient imaging  Se[facility] at [facility] ED on 3/24 with 3 week history of altered mental status, memory difficulty  CTH 3/24 demonstrated multiple hyperdense  ependyma/subependymal nodules  MRI brain 3/26 performed outpatient demonstrating multiple scattered foci of subependymal nodular enhancement with mild hydrocephalus, scattered nodular areas of enhancement in left anterior inferior basal ganglia dn foci of nodular enhancement along anteromedial aspect of left cerebral peduncle and left quirino  CTH 3/29 (this admission)  Stable subependymal nodules and left formamen of [place] region hyperdense lesion with dilation of left lateral ventrical and minimal left to right midline shift  S/p LP 3/31  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   MRI Brain (4/11) showed progression of disease from 3/30 to 4/11 with interval enlargement of the dominant left anterior basal ganglionic mass with greater surrounding vasogenic edema and mass effect and persistent leptomeningeal and intraventricular seeding with obstructive hydrocephalus--current differential lymphoma versus high-grade glioma less likely metastasis  Discussed with NSGY who is planning for biopsy on Monday

## 2025-04-13 NOTE — ANESTHESIA PREPROCEDURE EVALUATION
Procedure:  Left frontal endoscopic biopsy of ventricular mass and placement of EVD (Left: Head)    Relevant Problems   CARDIO   (+) HTN, goal below 130/80   (+) Mixed hyperlipidemia      ENDO   (+) Type 2 diabetes mellitus with hyperglycemia, without long-term current use of insulin (HCC)      GI/HEPATIC   (+) Liver lesion   (+) NAFLD (nonalcoholic fatty liver disease)      Neurology/Sleep   (+) Encephalopathy      Oncology   (+) Brain tumor (HCC)     EKG:  Normal sinus rhythm  Possible Inferior infarct (cited on or before 24-Mar-2025)  Abnormal ECG  Confirmed by Anthony Anderson ([id number]) on 3/29/2025 9:00:53 PM    CMP & CBC: WNL       Physical Exam    Airway    Mallampati score: unable to assess         Dental       Cardiovascular      Pulmonary      Other Findings  Patient unable to cooperate with airway examination    Anesthesia Plan  ASA Score- 3     Anesthesia Type- general with ASA Monitors.         Additional Monitors:     Airway Plan: ETT.           Plan Factors-    Chart reviewed. EKG reviewed. Imaging results reviewed. Existing labs reviewed. Patient summary reviewed.    Patient is not a current smoker.  Patient did not smoke on day of surgery.            Induction- intravenous.    Postoperative Plan- Plan for postoperative opioid use. Planned trial extubation    Perioperative Resuscitation Plan - Level 1 - Full Code.       Informed Consent- Anesthetic plan and risks discussed with daughter.  I personally reviewed this patient with the CRNA. Discussed and agreed on the Anesthesia Plan with the CRNA..      NPO Status:  No vitals data found for the desired time range.

## 2025-04-13 NOTE — RESPIRATORY THERAPY NOTE
04/13/25 1600   Respiratory Assessment   Resp Comments Pt taken to Cat scan without incident   Additional Assessments   Pulse 62   Respirations 21   SpO2 98 %   Transport   Patient Status Inpatient   Tolerated Well Yes

## 2025-04-13 NOTE — ASSESSMENT & PLAN NOTE
"Patient with development of worsening confusion, recently seen at the [facility] ED 3/24/2025 with CT head at that time with finding of brain lesion for which MRI was advised to exclude cancerous etiology.     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  Lymphoma/leukemia panel nondiagnostic  CT head on 4/3 with interval increase in ventricular caliber concerning for worsening hydrocephalus  No indication for AED per neurosurgery and holding off steroids until Dx achieved  Continue neurochecks   Neurosurgery and oncology recommended repeat high-volume LP as previous was nondiagnostic  Post LP 4/7 which was again undiagnostic.   MRI of the brain from 4/11-\"Interval enlargement of the dominant left anterior basal ganglionic mass lesion with greater surrounding vasogenic edema and mass effect. 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 Differential considerations include lymphoma, multicentric high-grade glioma, and less likely metastasis\"  Plan for biopsy on Monday, 4/14 by neurosurgery   Patient is more lethargic on the morning of 4/13; neurocritical care consulted, plan for stat CT head, video EEG, continue neurochecks; initiated Decadron  "

## 2025-04-13 NOTE — PLAN OF CARE
Problem: PAIN - ADULT  Goal: Verbalizes/displays adequate comfort level or baseline comfort level  Description: Interventions:- Encourage patient to monitor pain and request assistance- Assess pain using appropriate pain scale- Administer analgesics based on type and severity of pain and evaluate response- Implement non-pharmacological measures as appropriate and evaluate response- Notify physician/advanced practitioner if interventions unsuccessful or patient reports new pain  Outcome: Progressing     Problem: INFECTION - ADULT  Goal: Absence or prevention of progression during hospitalization  Description: INTERVENTIONS:- Assess and monitor for signs and symptoms of infection- Monitor lab/diagnostic results- Monitor all insertion sites, i.e. indwelling lines, tubes, and drains- [place] appropriate cooling/warming therapies per order- Administer medications as ordered- Instruct and encourage patient and family to use good hand hygiene technique- Identify and instruct in appropriate isolation precautions for identified infection/condition  Outcome: Progressing     Problem: SAFETY ADULT  Goal: Patient will remain free of falls  Description: INTERVENTIONS:- Educate patient/family on patient safety including physical limitations- Instruct patient to call for assistance with activity - Consult OT/PT to assist with strengthening/mobility - Keep Call bell within reach- Keep bed low and locked with side rails adjusted as appropriate- Keep care items and personal belongings within reach- Initiate and maintain comfort rounds- Make Fall Risk Sign visible to staff- Offer Toileting every 2 Hours, in advance of need- Initiate/Maintain bed/chair alarm- Obtain necessary fall risk management equipment.- Apply yellow socks and bracelet for high fall risk patients- Consider moving patient to room near nurses station  Outcome: Progressing  Goal: Maintain or return to baseline ADL function  Description: INTERVENTIONS:-  Assess  patient's ability to carry out ADLs; assess patient's baseline for ADL function and identify physical deficits which impact ability to perform ADLs (bathing, care of mouth/teeth, toileting, grooming, dressing, etc.)- Assess/evaluate cause of self-care deficits - Assess range of motion- Assess patient's mobility; develop plan if impaired- Assess patient's need for assistive devices and provide as appropriate- Encourage maximum independence but intervene and supervise when necessary- Involve family in performance of ADLs- Assess for home care needs following discharge - Consider OT consult to assist with ADL evaluation and planning for discharge- Provide patient education as appropriate  Outcome: Progressing  Goal: Maintains/Returns to pre admission functional level  Description: INTERVENTIONS:- Perform AM-PAC 6 Click Basic Mobility/ Daily Activity assessment daily.- Set and communicate daily mobility goal to care team and patient/family/caregiver. - Collaborate with rehabilitation services on mobility goals if consulted- Reposition patient every 2 hours.- Dangle patient 3 times a day- Stand patient 3 times a day- Ambulate patient 3 times a day- Out of bed to chair 3 times a day - Out of bed for meals 3 times a day- Out of bed for toileting- Record patient progress and toleration of activity level   Outcome: Progressing     Problem: DISCHARGE PLANNING  Goal: Discharge to home or other facility with appropriate resources  Description: INTERVENTIONS:- Identify barriers to discharge w/patient and caregiver- Arrange for needed discharge resources and transportation as appropriate- Identify discharge learning needs (meds, wound care, etc.)- Refer to Case Management Department for coordinating discharge planning if the patient needs post-hospital services based on physician/advanced practitioner order or complex needs related to functional status, cognitive ability, or social support system  Outcome: Progressing      Problem: NEUROSENSORY - ADULT  Goal: Achieves stable or improved neurological status  Description: INTERVENTIONS- Monitor and report changes in neurological status- Monitor vital signs such as temperature, blood pressure, glucose, and any other labs ordered - Initiate measures to prevent increased intracranial pressure- Monitor for seizure activity and implement precautions if appropriate    Outcome: Progressing  Goal: Remains free of injury related to seizures activity  Description: INTERVENTIONS- Maintain airway, patient safety  and administer oxygen as ordered- Monitor patient for seizure activity, document and report duration and description of seizure to physician/advanced practitioner- If seizure occurs,  ensure patient safety during seizure- Reorient patient post seizure- Seizure pads on all 4 side rails- Instruct patient/family to notify RN of any seizure activity including if an aura is experienced- Instruct patient/family to call for assistance with activity based on nursing assessment- Administer anti-seizure medications if ordered  Outcome: Progressing  Goal: Achieves maximal functionality and self care  Description: INTERVENTIONS- Monitor swallowing and airway patency with patient fatigue and changes in neurological status- Encourage and assist patient to increase activity and self care. - Encourage visually impaired, hearing impaired and aphasic patients to use assistive/communication devices  Outcome: Progressing     Problem: Knowledge Deficit  Goal: Patient/family/caregiver demonstrates understanding of disease process, treatment plan, medications, and discharge instructions  Description: Complete learning assessment and assess knowledge base.Interventions:- Provide teaching at level of understanding- Provide teaching via preferred learning methods  Outcome: Progressing

## 2025-04-13 NOTE — ASSESSMENT & PLAN NOTE
Resolved  Noted erythema and warmth to all toes on the left foot this morning (4/12), patient is confused- unable to relay any history  Given the patient is a high risk for complication will consult podiatry for evaluation of suspected cellulitis etc.-noted hammertoe deformities, no acute pathology

## 2025-04-13 NOTE — PROCEDURES
Kahlil Rivera MD    The goals and alternatives to insertion of an external ventricular drain were discussed with family. The risks were discussed in detail.     1. Risk of neurological injury with new pain, weakness or numbness in the arms and legs, difficulties with speech, language and vision. The risk of seizures was described. Risk of hemorrhage requiring additional surgery was described.  2. Risk of ventriculostomy malfunction requiring replacement.  3. Risk of infection and meningitis.    Once all questions were answered to their satisfaction, they asked to proceed with surgery.    Procedure Note    A full surgical timeout was undertaken identifying the site, side and type of surgery. Care was taken to insure that the neck was in a neutral position. The left side of the head was shaved. A horizontal incision was planned in the mid pupillary line at the coronal suture. The skin was then prepped and draped in usual sterile fashion.The incision was infiltrated with 1% lidocaine with 100,000 of epinephrine.    The incision over the coronal suture was incised with a 15 blade. A twist drill was used to drill a siomara hole at the coronal suture. The underlying dura was perforated.  A ventriculostomy catheter was placed perpendicular to the outer table of the skull and advanced in the mid pupillary line to 7 cm from the inner table of the skull. There was egress of CSF, which was collected for flow cytometry studies. The ventriculostomy catheter was then tunneled approximately 5 cm using a trocar to an exit site in the temporal parietal area.    The incision was irrigated copiously. Prolene sutures were used to approximate the skin edges and as a drain stitch at the exit site. The hub was attached to the distal end of the ventriculostomy catheter and secured with a tie. This was connected to a Buretrol and left open at 15mmHg above the external auditory meatus. Dressings were applied by the  nurse.    All sharps were discarded at the end of the case and no complications. The patient will remain in ICU for ongoing observation.

## 2025-04-13 NOTE — QUICK NOTE
Contacted by the ICU team regarding worsening mental status on this patient. In brief he is a 64 year old male with diffuse subependymal lesions as well as large left basal ganglia suspicious for lymphoma. He is s/p non-diagnostic CSF studies and pending OR tomorrow for biopsy and EVD placement. Unfortunately this afternoon his mental status worsened and a head Ct was obtained which shows progressive ventriculomegaly and likely trapped left lateral ventricle. I discussed with his daughter that I thought the best course of action would be to proceed with EVD placement now as I am concern that obstructive hydrocephalus is largely contributing to his exam. We discussed risks including bleeding, infection, need for continued CSF diversion. Aftyer this discussion she was agreeable to proceed. We will plan to intubate and place a left frontal EVD in preparation for the OR tomorrow. Will send repeat CSF studies after drain is placed.

## 2025-04-13 NOTE — PROGRESS NOTES
Progress Note - Oncology-Medical   Name: Alexis Dennison 64 y.o. male I MRN: [id number]  Unit/Bed#: ICU 03 I Date of Admission: 3/29/2025   Date of Service: 4/13/2025 I Hospital Day: 15     Assessment & Plan  Brain tumor (HCC)  INTERIM ASSESSMENT APRIL 13, 2025:  The patient has some deterioration of his neurologic status with more somnolence.  A noncontrast CT scan of the head obtained earlier today showed predominantly left sided obstructive hydrocephalus from the lesion located adjacent to the foramen of Monro, not significantly changed compared to April 3, 2025. Blood products within the occipital horn of the left lateral ventricle was similar compared to MRI of the brain from April 11 2025. The left anterior basal ganglia mass with surrounding edema, regional mass effect, and effacement of the left suprasellar cistern, appeared similar to MRI of the brain from April 11 2025.      I discussed these findings with the primary internal medicine team.  The patient may be transferred to the intensive care unit for close monitoring of neurologic status.  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  I advised the primary team to get in contact with the neurosurgery team to obtain additional recommendations, specifically about neurosurgical incisional/excisional biopsy of the left anterior based on ganglia mass.  Also, because of high suspicion of a primary CNS lymphoma, treatment with systemic steroids for brain edema may result in a none-diagnostic left basal ganglia mass biopsy.  The patient needs to have a neurosurgical incisional/excisional biopsy of the left basal ganglia mass as soon as possible.  We will continue to closely follow the patient with the primary team.  Please, call us with additional questions as needed.    Prior Clinical  Summary:  Admitted 3/29 with worsening confusion since prior ED presentation and outpatient imaging  Seen at [facility] ED on 3/24 with 3 week history of altered mental status, memory difficulty  CTH 3/24 demonstrated multiple hyperdense ependyma/subependymal nodules  MRI brain 3/26 performed outpatient demonstrating multiple scattered foci of subependymal nodular enhancement with mild hydrocephalus, scattered nodular areas of enhancement in left anterior inferior basal ganglia dn foci of nodular enhancement along anteromedial aspect of left cerebral peduncle and left quirino  CTH 3/29 (this admission)  Stable subependymal nodules and left formamen of De Oliveira region hyperdense lesion with dilation of left lateral ventrical and minimal left to right midline shift  S/p LP 3/31  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   MRI Brain (4/11) showed progression of disease from 3/30 to 4/11 with interval enlargement of the dominant left anterior basal ganglionic mass with greater surrounding vasogenic edema and mass effect and persistent leptomeningeal and intraventricular seeding with obstructive hydrocephalus--current differential lymphoma versus high-grade glioma less likely metastasis  Discussed with NSGY who is planning for biopsy on Monday  Encephalopathy  Most likely d/t brain mass, see interim A/P above  Pulmonary nodule  CTA 3/29 with nonspecific 4 mm RLL pulm nodule, recommendation for 3 month follow up  Liver lesion  CTA 3/29 with nonspecific 12mm hypodense right hepatic lesion,  recommended MRI abdomen  MRI 3/30 with no suspicious hepatic lesions, demonstrated simple right hepatic lobe cyst    Eva Ortiz MD

## 2025-04-13 NOTE — CONSULTS
Consultation - Critical Care/ICU   Name: Alexis Dennison 64 y.o. male I MRN: [id number]  Unit/Bed#: ICU 03 I Date of Admission: 3/29/2025   Date of Service: 4/13/2025 I Hospital Day: 15   Inpatient consult to Neurocritical care  Consult performed by: Brianna Egan PA-C  Consult ordered by: Rosa Lockhart MD      Physician Requesting Evaluation: Cami Rivera MD   Reason for Evaluation / Principal Problem: L anterior basal ganglia mass with surrounding edema, obstructive hydrocephalus, IVH        Assessment & Plan   Active Hospital Problems    Diagnosis Date Noted POA    Brain tumor (HCC) 03/29/2025 Yes    Foot erythema 04/12/2025 Unknown    Encephalopathy 04/02/2025 Unknown    Constipation 04/01/2025 Unknown    Ambulatory dysfunction 04/01/2025 Unknown    Thyroid nodule 03/30/2025 Unknown    Pulmonary nodule 03/30/2025 Unknown    Liver lesion 03/30/2025 Unknown    Type 2 diabetes mellitus with hyperglycemia, without long-term current use of insulin (HCC) 06/14/2022 Yes    Mixed hyperlipidemia 06/14/2022 Yes    HTN, goal below 130/80 06/14/2022 Yes      Resolved Hospital Problems    Diagnosis Date Noted Date Resolved POA    Abnormal CT scan 03/29/2025 03/30/2025 Yes     Neuro:   L anterior basal ganglia mass with surrounding edema, obstructive hydrocephalus, small amount of IVH  4/11 MRI: Interval enlargement of the dominant left anterior basal ganglionic mass lesion with greater surrounding vasogenic edema and mass effect. 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  4/13 CT head: largely unchanged left sided obstructive hydrocephalus, blood products within the occipital horn of the left lateral ventricle is similar from the MRI brain 4/11. Left anterior basal ganglia mass with surrounding edema, regional mass effect, and effacement of the left suprasellar cistern, appears similar to MRI of the brain 4/11/2025.    Appreciate neurosurgery recommendations.   Plan for bedside EVD placement  OR biopsy 4/14  Hold further steroid dosing  Q1 hour neuro checks  vEEG  Repeat CT head with change in GCS > 2 points in 1 hour  Daily CAM-ICU, delirium precautions. Regulate sleep/wake cycle.      CV:   HTN  Home regimen: Hyzaar 100-25mg   Hold losartan/HCTZ  Goal normotension  HLD  Continue statin     Pulm:  No active pulmonary issues, was on room air prior to transfer to ICU  Plan for intubation for airway protection prior to EVD placement    GI:   GERD  Protonix daily  Bowel regimen  Senokot, miralax, daily dulcolax suppository   Last BM: 4/8    :   No active issues. Trend strict I/Os    F/E/N:   Start mIVF with isolyte  Replete electrolytes as needed for goal Mag > 2.0, Phos >3.0, K >4.0  NPO    Heme/Onc:   Hold chemoprophylaxis, SCDs only    Endo:   DM2  Hold home metformin and janumet  SSI    ID:   No active issues. Trend fever curve/white count.    MSK/Skin:   Frequent turns/repositioning, offloading  PT/OT    Disposition: Critical care    History of Present Illness   Alexis Dennison is a 64 y.o. M w/ PMHx HTN, HLD, DM2, GERD, and sleep apnea who presented to the ER with worsening confusion in the setting of a recently discovered brain mass. Over several months, he was noted by family to have increasing confusion and forgetfulness. He was initially seen in [facility] ED 3/24/25 where CT head showed brain mass for which MRI was advised. He followed up with his PCP and was referred for MRI, which revealed potential malignancy. He was scheduled to see neurosurgery outpatient, but had progression of his confusion and double vision, and was subsequently brought to the ER. CTA chest/abdomen/pelvis was performed and revealed 4mm R lower lobe pulmonary nodule, 12mm hypodense liver lesion, and heterogenous nodular enhance of the left thyroid lobe. Patient was seen by neurosurgery and underwent LP on 3/31, which was suspicious for CNS lymphoma,  however, lymphoma/leukemia panel was non-diagnostic. Due to location of the lesion, neurosurgery deemed that biopsy would be difficult and could result in significant morbidity. Therefore, it was recommended to undergo repeat high-volume LP, which was performed on 4/7 and again was non-diagnostic. MRI was performed on 4/11 and showed interval enlargement of the dominant L anterior basal ganglionic mass with greater surrounding vasogenic edema and mass effect. Patient was scheduled for biopsy with neurosurgery 4/14. In the interim, patient became more lethargic on 4/13 prompting neurocritical care evaluation. Repeat CT head was performed and decision was made to place bedside EVD.     History obtained from chart review.  Review of Systems: Review of Systems not obtainable due to Altered mental status    Historical Information   Past Medical History:  No date: Colon polyp  No date: Diabetes mellitus (HCC)  No date: GERD (gastroesophageal reflux disease)  No date: Hyperlipidemia  No date: Hypertension  No date: Liver disease  No date: Sleep apnea Past Surgical History:  No date: COLONOSCOPY  No date: CYST REMOVAL      Comment:  back  3/31/2025: FL LUMBAR PUNCTURE DIAGNOSTIC  4/7/2025: FL LUMBAR PUNCTURE DIAGNOSTIC  6/7/2023: AR EXC B9 LESION MRGN XCP SK TG T/A/L 0.6-1.0 CM; N/A      Comment:  Procedure: EXCISION  BIOPSY LESION/MASS ABDOMINAL/CHEST                WALL;  Surgeon: Trevor Villavicencio MD;  Location:  MAIN                OR;  Service: General   Current Outpatient Medications   Medication Instructions    aspirin (ECOTRIN LOW STRENGTH) 81 mg EC tablet Daily    atorvastatin (LIPITOR) 20 mg, Daily    Cyanocobalamin (Vitamin B 12) 500 MCG TABS Daily    losartan-hydrochlorothiazide (HYZAAR) 100-25 MG per tablet TAKE 1 TABLET BY MOUTH EVERY DAY FOR 30 DAYS    metFORMIN (GLUCOPHAGE-XR) 1,000 mg, Oral, Daily    omeprazole (PRILOSEC) 10 mg, Daily    SITagliptin-metFORMIN HCl ER (Janumet XR)  MG TB24 1 tablet,  "Oral, Daily    vitamin B-12 (VITAMIN B-12) 1,000 mcg, Daily    Vitamin D 2,000 Units, Daily    No Known Allergies   Social History     Tobacco Use    Smoking status: Never    Smokeless tobacco: Never   Vaping Use    Vaping status: Never Used   Substance Use Topics    Alcohol use: Yes     Alcohol/week: 1.0 standard drink of alcohol     Types: 1 Cans of beer per week     Comment: socially    Drug use: Never    Family History   Problem Relation Age of Onset    Cancer Mother     Cancer Father     Diabetes Paternal Grandfather     Diabetes unspecified Paternal Grandfather         Type 2    Colon polyps Neg Hx     Colon cancer Neg Hx           Objective :                   Vitals I/O      Most Recent Min/Max in 24hrs   Temp 98.2 °F (36.8 °C) Temp  Min: 97.9 °F (36.6 °C)  Max: 98.2 °F (36.8 °C)   Pulse (!) 53 Pulse  Min: 49  Max: 58   Resp 14 Resp  Min: 14  Max: 16   /77 BP  Min: 111/61  Max: 135/66   O2 Sat 97 % SpO2  Min: 90 %  Max: 98 %    No intake or output data in the 24 hours ending 04/13/25 1443    Diet NPO    Invasive Monitoring           Physical Exam   Physical Exam  Vitals and nursing note reviewed.   Eyes:      Pupils: Pupils are equal, round, and reactive to light.   Skin:     General: Skin is warm and dry.   HENT:      Head: Normocephalic and atraumatic.   Cardiovascular:      Rate and Rhythm: Regular rhythm. Bradycardia present.      Heart sounds: Normal heart sounds.   Musculoskeletal:      Right lower leg: No edema.      Left lower leg: No edema.   Abdominal: General: There is no distension.      Palpations: Abdomen is soft.   Pulmonary:      Effort: Pulmonary effort is normal.      Breath sounds: Normal breath sounds.   Neurological:      Mental Status: He is lethargic.      GCS: GCS eye subscore is 3. GCS verbal subscore is 2. GCS motor subscore is 6.      Comments: Opens eyes to voice but quickly falls back to sleep  Only stated \"what\" when his name was called, did not answer any questions. " Otherwise nonverbal  Followed commands with b/l UE, moving all extremities spontaneously but would not follow commands in LE          Diagnostic Studies        Lab Results: I have reviewed the following results:     Medications:  Scheduled PRN   atorvastatin, 20 mg, Daily  bisacodyl, 10 mg, Daily  chlorhexidine, 15 mL, Q12H CAIT  Cholecalciferol, 2,000 Units, Daily  vitamin B-12, 1,000 mcg, Daily  [Held by provider] dexamethasone, 4 mg, Q6H CAIT  [Held by provider] losartan, 100 mg, Daily   And  [Held by provider] hydroCHLOROthiazide, 25 mg, Daily  insulin lispro, 1-6 Units, Q6H  lidocaine-epinephrine, ,   [START ON 4/14/2025] pantoprazole, 40 mg, Q24H CAIT  polyethylene glycol, 17 g, Daily  senna, 2 tablet, HS      acetaminophen, 650 mg, Q6H PRN  aluminum-magnesium hydroxide-simethicone, 30 mL, Q4H PRN  lidocaine-epinephrine, ,   ondansetron, 4 mg, Q6H PRN       Continuous    multi-electrolyte, 75 mL/hr  propofol, 5-50 mcg/kg/min, Last Rate: 50 mcg/kg/min (04/13/25 1427)         Labs:   CBC    Recent Labs     04/12/25  1139 04/13/25  0510   WBC 7.40 7.22   HGB 16.2 16.4   HCT 46.2 48.0    194     BMP    Recent Labs     04/13/25  0510   SODIUM 140   K 4.5      CO2 31   AGAP 9   BUN 16   CREATININE 0.97   CALCIUM 10.1       Coags    No recent results     Additional Electrolytes  No recent results       Blood Gas    No recent results  Recent Labs     04/13/25  1257   PHVEN 7.393   LDY8IMI 43.8   PO2VEN 39.3   KSZ5PRH 26.1   BEVEN 0.8   N8OPXFR 72.9    LFTs  No recent results    Infectious  No recent results  Glucose  Recent Labs     04/13/25  0510   GLUC 117

## 2025-04-14 ENCOUNTER — ANESTHESIA (INPATIENT)
Dept: PERIOP | Facility: HOSPITAL | Age: 65
End: 2025-04-14
Payer: COMMERCIAL

## 2025-04-14 ENCOUNTER — APPOINTMENT (INPATIENT)
Dept: RADIOLOGY | Facility: HOSPITAL | Age: 65
DRG: 820 | End: 2025-04-14
Payer: COMMERCIAL

## 2025-04-14 PROBLEM — E11.65 TYPE 2 DIABETES MELLITUS WITH HYPERGLYCEMIA, WITHOUT LONG-TERM CURRENT USE OF INSULIN (HCC): Chronic | Status: ACTIVE | Noted: 2022-06-14

## 2025-04-14 PROBLEM — I10 HTN, GOAL BELOW 130/80: Chronic | Status: ACTIVE | Noted: 2022-06-14

## 2025-04-14 PROBLEM — E78.2 MIXED HYPERLIPIDEMIA: Chronic | Status: ACTIVE | Noted: 2022-06-14

## 2025-04-14 PROBLEM — R53.83 FATIGUE: Status: RESOLVED | Noted: 2022-06-14 | Resolved: 2025-04-14

## 2025-04-14 PROBLEM — L53.9 FOOT ERYTHEMA: Status: RESOLVED | Noted: 2025-04-12 | Resolved: 2025-04-14

## 2025-04-14 LAB
ABO GROUP BLD: NORMAL
ANION GAP SERPL CALCULATED.3IONS-SCNC: 13 MMOL/L (ref 4–13)
BLD GP AB SCN SERPL QL: NEGATIVE
BUN SERPL-MCNC: 18 MG/DL (ref 5–25)
CA-I BLD-SCNC: 1.21 MMOL/L (ref 1.12–1.32)
CALCIUM SERPL-MCNC: 9.9 MG/DL (ref 8.4–10.2)
CHLORIDE SERPL-SCNC: 97 MMOL/L (ref 96–108)
CO2 SERPL-SCNC: 27 MMOL/L (ref 21–32)
CREAT SERPL-MCNC: 0.98 MG/DL (ref 0.6–1.3)
ERYTHROCYTE [DISTWIDTH] IN BLOOD BY AUTOMATED COUNT: 11.7 % (ref 11.6–15.1)
GFR SERPL CREATININE-BSD FRML MDRD: 80 ML/MIN/1.73SQ M
GLUCOSE SERPL-MCNC: 133 MG/DL (ref 65–140)
GLUCOSE SERPL-MCNC: 152 MG/DL (ref 65–140)
GLUCOSE SERPL-MCNC: 152 MG/DL (ref 65–140)
GLUCOSE SERPL-MCNC: 166 MG/DL (ref 65–140)
GLUCOSE SERPL-MCNC: 166 MG/DL (ref 65–140)
GLUCOSE SERPL-MCNC: 176 MG/DL (ref 65–140)
GLUCOSE SERPL-MCNC: 180 MG/DL (ref 65–140)
HCT VFR BLD AUTO: 44 % (ref 36.5–49.3)
HGB BLD-MCNC: 15.6 G/DL (ref 12–17)
MAGNESIUM SERPL-MCNC: 2.2 MG/DL (ref 1.9–2.7)
MCH RBC QN AUTO: 30.8 PG (ref 26.8–34.3)
MCHC RBC AUTO-ENTMCNC: 35.5 G/DL (ref 31.4–37.4)
MCV RBC AUTO: 87 FL (ref 82–98)
PHOSPHATE SERPL-MCNC: 3.7 MG/DL (ref 2.3–4.1)
PLATELET # BLD AUTO: 202 THOUSANDS/UL (ref 149–390)
PMV BLD AUTO: 10.4 FL (ref 8.9–12.7)
POTASSIUM SERPL-SCNC: 4.2 MMOL/L (ref 3.5–5.3)
RBC # BLD AUTO: 5.07 MILLION/UL (ref 3.88–5.62)
RH BLD: POSITIVE
SODIUM SERPL-SCNC: 137 MMOL/L (ref 135–147)
SPECIMEN EXPIRATION DATE: NORMAL
WBC # BLD AUTO: 16.33 THOUSAND/UL (ref 4.31–10.16)

## 2025-04-14 PROCEDURE — 84100 ASSAY OF PHOSPHORUS: CPT | Performed by: PHYSICIAN ASSISTANT

## 2025-04-14 PROCEDURE — 94003 VENT MGMT INPAT SUBQ DAY: CPT

## 2025-04-14 PROCEDURE — 82330 ASSAY OF CALCIUM: CPT | Performed by: PHYSICIAN ASSISTANT

## 2025-04-14 PROCEDURE — 85027 COMPLETE CBC AUTOMATED: CPT | Performed by: NURSE PRACTITIONER

## 2025-04-14 PROCEDURE — 99233 SBSQ HOSP IP/OBS HIGH 50: CPT | Performed by: ANESTHESIOLOGY

## 2025-04-14 PROCEDURE — 82948 REAGENT STRIP/BLOOD GLUCOSE: CPT

## 2025-04-14 PROCEDURE — 83735 ASSAY OF MAGNESIUM: CPT | Performed by: PHYSICIAN ASSISTANT

## 2025-04-14 PROCEDURE — NC001 PR NO CHARGE: Performed by: ANESTHESIOLOGY

## 2025-04-14 PROCEDURE — 70450 CT HEAD/BRAIN W/O DYE: CPT

## 2025-04-14 PROCEDURE — 00B63ZX EXCISION OF CEREBRAL VENTRICLE, PERCUTANEOUS APPROACH, DIAGNOSTIC: ICD-10-PCS | Performed by: NEUROLOGICAL SURGERY

## 2025-04-14 PROCEDURE — 87081 CULTURE SCREEN ONLY: CPT | Performed by: NURSE PRACTITIONER

## 2025-04-14 PROCEDURE — 86900 BLOOD TYPING SEROLOGIC ABO: CPT | Performed by: NURSE PRACTITIONER

## 2025-04-14 PROCEDURE — 80048 BASIC METABOLIC PNL TOTAL CA: CPT | Performed by: PHYSICIAN ASSISTANT

## 2025-04-14 PROCEDURE — 86901 BLOOD TYPING SEROLOGIC RH(D): CPT | Performed by: NURSE PRACTITIONER

## 2025-04-14 PROCEDURE — 009630Z DRAINAGE OF CEREBRAL VENTRICLE WITH DRAINAGE DEVICE, PERCUTANEOUS APPROACH: ICD-10-PCS | Performed by: NEUROLOGICAL SURGERY

## 2025-04-14 PROCEDURE — 99233 SBSQ HOSP IP/OBS HIGH 50: CPT | Performed by: INTERNAL MEDICINE

## 2025-04-14 PROCEDURE — C1713 ANCHOR/SCREW BN/BN,TIS/BN: HCPCS | Performed by: NEUROLOGICAL SURGERY

## 2025-04-14 PROCEDURE — 94760 N-INVAS EAR/PLS OXIMETRY 1: CPT | Performed by: SOCIAL WORKER

## 2025-04-14 PROCEDURE — 86850 RBC ANTIBODY SCREEN: CPT | Performed by: NURSE PRACTITIONER

## 2025-04-14 PROCEDURE — NC001 PR NO CHARGE: Performed by: PHYSICIAN ASSISTANT

## 2025-04-14 DEVICE — IMPLANTABLE DEVICE
Type: IMPLANTABLE DEVICE | Site: CRANIAL | Status: FUNCTIONAL
Brand: THINFLAP

## 2025-04-14 DEVICE — IMPLANTABLE DEVICE
Type: IMPLANTABLE DEVICE | Site: CRANIAL | Status: FUNCTIONAL
Brand: THINFLAP SYSTEM

## 2025-04-14 RX ORDER — CHLORHEXIDINE GLUCONATE ORAL RINSE 1.2 MG/ML
15 SOLUTION DENTAL ONCE
Status: DISCONTINUED | OUTPATIENT
Start: 2025-04-14 | End: 2025-04-14

## 2025-04-14 RX ORDER — FENTANYL CITRATE 50 UG/ML
INJECTION, SOLUTION INTRAMUSCULAR; INTRAVENOUS AS NEEDED
Status: DISCONTINUED | OUTPATIENT
Start: 2025-04-14 | End: 2025-04-14

## 2025-04-14 RX ORDER — LEVETIRACETAM 500 MG/5ML
500 INJECTION, SOLUTION, CONCENTRATE INTRAVENOUS ONCE
Status: COMPLETED | OUTPATIENT
Start: 2025-04-14 | End: 2025-04-14

## 2025-04-14 RX ORDER — LIDOCAINE HYDROCHLORIDE 10 MG/ML
INJECTION, SOLUTION EPIDURAL; INFILTRATION; INTRACAUDAL; PERINEURAL AS NEEDED
Status: DISCONTINUED | OUTPATIENT
Start: 2025-04-14 | End: 2025-04-14

## 2025-04-14 RX ORDER — GINSENG 100 MG
CAPSULE ORAL AS NEEDED
Status: DISCONTINUED | OUTPATIENT
Start: 2025-04-14 | End: 2025-04-14 | Stop reason: HOSPADM

## 2025-04-14 RX ORDER — PANTOPRAZOLE SODIUM 40 MG/1
40 TABLET, DELAYED RELEASE ORAL
Status: DISCONTINUED | OUTPATIENT
Start: 2025-04-14 | End: 2025-05-07 | Stop reason: HOSPADM

## 2025-04-14 RX ORDER — BUPIVACAINE HYDROCHLORIDE AND EPINEPHRINE 5; 5 MG/ML; UG/ML
INJECTION, SOLUTION EPIDURAL; INTRACAUDAL; PERINEURAL AS NEEDED
Status: DISCONTINUED | OUTPATIENT
Start: 2025-04-14 | End: 2025-04-14 | Stop reason: HOSPADM

## 2025-04-14 RX ORDER — SODIUM CHLORIDE 9 MG/ML
75 INJECTION, SOLUTION INTRAVENOUS CONTINUOUS
Status: DISCONTINUED | OUTPATIENT
Start: 2025-04-14 | End: 2025-04-16

## 2025-04-14 RX ORDER — SODIUM CHLORIDE, SODIUM LACTATE, POTASSIUM CHLORIDE, CALCIUM CHLORIDE 600; 310; 30; 20 MG/100ML; MG/100ML; MG/100ML; MG/100ML
125 INJECTION, SOLUTION INTRAVENOUS CONTINUOUS
Status: DISCONTINUED | OUTPATIENT
Start: 2025-04-14 | End: 2025-04-14

## 2025-04-14 RX ORDER — HYDROMORPHONE HCL/PF 1 MG/ML
0.5 SYRINGE (ML) INJECTION
Status: DISCONTINUED | OUTPATIENT
Start: 2025-04-14 | End: 2025-04-14

## 2025-04-14 RX ORDER — DEXAMETHASONE SODIUM PHOSPHATE 10 MG/ML
INJECTION, SOLUTION INTRAMUSCULAR; INTRAVENOUS AS NEEDED
Status: DISCONTINUED | OUTPATIENT
Start: 2025-04-14 | End: 2025-04-14

## 2025-04-14 RX ORDER — ACETAMINOPHEN 325 MG/1
975 TABLET ORAL EVERY 8 HOURS SCHEDULED
Status: DISCONTINUED | OUTPATIENT
Start: 2025-04-14 | End: 2025-04-14

## 2025-04-14 RX ORDER — ONDANSETRON 2 MG/ML
INJECTION INTRAMUSCULAR; INTRAVENOUS AS NEEDED
Status: DISCONTINUED | OUTPATIENT
Start: 2025-04-14 | End: 2025-04-14

## 2025-04-14 RX ORDER — DEXAMETHASONE SODIUM PHOSPHATE 4 MG/ML
4 INJECTION, SOLUTION INTRA-ARTICULAR; INTRALESIONAL; INTRAMUSCULAR; INTRAVENOUS; SOFT TISSUE EVERY 6 HOURS SCHEDULED
Status: DISCONTINUED | OUTPATIENT
Start: 2025-04-14 | End: 2025-04-22

## 2025-04-14 RX ORDER — PROPOFOL 10 MG/ML
INJECTION, EMULSION INTRAVENOUS CONTINUOUS PRN
Status: DISCONTINUED | OUTPATIENT
Start: 2025-04-14 | End: 2025-04-14

## 2025-04-14 RX ORDER — FENTANYL CITRATE/PF 50 MCG/ML
25 SYRINGE (ML) INJECTION
Status: DISCONTINUED | OUTPATIENT
Start: 2025-04-14 | End: 2025-04-14

## 2025-04-14 RX ORDER — FENTANYL CITRATE 50 UG/ML
50 INJECTION, SOLUTION INTRAMUSCULAR; INTRAVENOUS
Refills: 0 | Status: DISCONTINUED | OUTPATIENT
Start: 2025-04-14 | End: 2025-04-14

## 2025-04-14 RX ORDER — LIDOCAINE HYDROCHLORIDE AND EPINEPHRINE 10; 10 MG/ML; UG/ML
INJECTION, SOLUTION INFILTRATION; PERINEURAL AS NEEDED
Status: DISCONTINUED | OUTPATIENT
Start: 2025-04-14 | End: 2025-04-14 | Stop reason: HOSPADM

## 2025-04-14 RX ORDER — ROCURONIUM BROMIDE 10 MG/ML
INJECTION, SOLUTION INTRAVENOUS AS NEEDED
Status: DISCONTINUED | OUTPATIENT
Start: 2025-04-14 | End: 2025-04-14

## 2025-04-14 RX ORDER — PROPOFOL 10 MG/ML
INJECTION, EMULSION INTRAVENOUS AS NEEDED
Status: DISCONTINUED | OUTPATIENT
Start: 2025-04-14 | End: 2025-04-14

## 2025-04-14 RX ORDER — OXYCODONE HYDROCHLORIDE 5 MG/1
5 TABLET ORAL EVERY 4 HOURS PRN
Status: DISCONTINUED | OUTPATIENT
Start: 2025-04-14 | End: 2025-05-07 | Stop reason: HOSPADM

## 2025-04-14 RX ORDER — CALCIUM CARBONATE 500 MG/1
1000 TABLET, CHEWABLE ORAL DAILY PRN
Status: DISCONTINUED | OUTPATIENT
Start: 2025-04-14 | End: 2025-05-07 | Stop reason: HOSPADM

## 2025-04-14 RX ORDER — ACETAMINOPHEN 10 MG/ML
1000 INJECTION, SOLUTION INTRAVENOUS EVERY 6 HOURS SCHEDULED
Status: DISCONTINUED | OUTPATIENT
Start: 2025-04-15 | End: 2025-04-16

## 2025-04-14 RX ORDER — SODIUM CHLORIDE 9 MG/ML
INJECTION, SOLUTION INTRAVENOUS CONTINUOUS PRN
Status: DISCONTINUED | OUTPATIENT
Start: 2025-04-14 | End: 2025-04-14

## 2025-04-14 RX ORDER — HYDROMORPHONE HCL IN WATER/PF 6 MG/30 ML
0.2 PATIENT CONTROLLED ANALGESIA SYRINGE INTRAVENOUS EVERY 2 HOUR PRN
Status: DISCONTINUED | OUTPATIENT
Start: 2025-04-14 | End: 2025-04-29

## 2025-04-14 RX ORDER — CEFAZOLIN SODIUM 2 G/50ML
2000 SOLUTION INTRAVENOUS ONCE
Status: COMPLETED | OUTPATIENT
Start: 2025-04-14 | End: 2025-04-14

## 2025-04-14 RX ORDER — SODIUM CHLORIDE, SODIUM LACTATE, POTASSIUM CHLORIDE, CALCIUM CHLORIDE 600; 310; 30; 20 MG/100ML; MG/100ML; MG/100ML; MG/100ML
INJECTION, SOLUTION INTRAVENOUS CONTINUOUS PRN
Status: DISCONTINUED | OUTPATIENT
Start: 2025-04-14 | End: 2025-04-14

## 2025-04-14 RX ADMIN — DEXAMETHASONE SODIUM PHOSPHATE 4 MG: 4 INJECTION INTRA-ARTICULAR; INTRALESIONAL; INTRAMUSCULAR; INTRAVENOUS; SOFT TISSUE at 18:04

## 2025-04-14 RX ADMIN — PHENYLEPHRINE HYDROCHLORIDE 100 MCG: 10 INJECTION INTRAVENOUS at 11:34

## 2025-04-14 RX ADMIN — ROCURONIUM 50 MG: 50 INJECTION, SOLUTION INTRAVENOUS at 11:18

## 2025-04-14 RX ADMIN — PHENYLEPHRINE HYDROCHLORIDE 40 MCG/MIN: 10 INJECTION INTRAVENOUS at 11:35

## 2025-04-14 RX ADMIN — CHLORHEXIDINE GLUCONATE 0.12% ORAL RINSE 15 ML: 1.2 LIQUID ORAL at 08:10

## 2025-04-14 RX ADMIN — LEVETIRACETAM 500 MG: 100 INJECTION, SOLUTION INTRAVENOUS at 16:22

## 2025-04-14 RX ADMIN — CHLORHEXIDINE GLUCONATE 0.12% ORAL RINSE 15 ML: 1.2 LIQUID ORAL at 20:23

## 2025-04-14 RX ADMIN — FENTANYL CITRATE 50 MCG: 50 INJECTION INTRAMUSCULAR; INTRAVENOUS at 14:21

## 2025-04-14 RX ADMIN — SODIUM CHLORIDE, SODIUM LACTATE, POTASSIUM CHLORIDE, AND CALCIUM CHLORIDE: .6; .31; .03; .02 INJECTION, SOLUTION INTRAVENOUS at 11:09

## 2025-04-14 RX ADMIN — PROPOFOL 20 MCG/KG/MIN: 10 INJECTION, EMULSION INTRAVENOUS at 02:17

## 2025-04-14 RX ADMIN — Medication 2000 UNITS: at 08:10

## 2025-04-14 RX ADMIN — SODIUM CHLORIDE: 0.9 INJECTION, SOLUTION INTRAVENOUS at 11:30

## 2025-04-14 RX ADMIN — ROCURONIUM 20 MG: 50 INJECTION, SOLUTION INTRAVENOUS at 12:08

## 2025-04-14 RX ADMIN — PROPOFOL 150 MG: 10 INJECTION, EMULSION INTRAVENOUS at 11:17

## 2025-04-14 RX ADMIN — PANTOPRAZOLE SODIUM 40 MG: 40 INJECTION, POWDER, FOR SOLUTION INTRAVENOUS at 08:10

## 2025-04-14 RX ADMIN — CEFAZOLIN SODIUM 2000 MG: 2 SOLUTION INTRAVENOUS at 11:35

## 2025-04-14 RX ADMIN — ROCURONIUM 20 MG: 50 INJECTION, SOLUTION INTRAVENOUS at 13:21

## 2025-04-14 RX ADMIN — DEXAMETHASONE SODIUM PHOSPHATE 4 MG: 4 INJECTION INTRA-ARTICULAR; INTRALESIONAL; INTRAMUSCULAR; INTRAVENOUS; SOFT TISSUE at 23:02

## 2025-04-14 RX ADMIN — FENTANYL CITRATE 50 MCG: 50 INJECTION INTRAMUSCULAR; INTRAVENOUS at 13:57

## 2025-04-14 RX ADMIN — SODIUM CHLORIDE, SODIUM GLUCONATE, SODIUM ACETATE, POTASSIUM CHLORIDE, MAGNESIUM CHLORIDE, SODIUM PHOSPHATE, DIBASIC, AND POTASSIUM PHOSPHATE 75 ML/HR: .53; .5; .37; .037; .03; .012; .00082 INJECTION, SOLUTION INTRAVENOUS at 05:30

## 2025-04-14 RX ADMIN — INSULIN LISPRO 1 UNITS: 100 INJECTION, SOLUTION INTRAVENOUS; SUBCUTANEOUS at 05:46

## 2025-04-14 RX ADMIN — LIDOCAINE HYDROCHLORIDE 50 MG: 10 INJECTION, SOLUTION EPIDURAL; INFILTRATION; INTRACAUDAL at 11:17

## 2025-04-14 RX ADMIN — POLYETHYLENE GLYCOL 3350 17 G: 17 POWDER, FOR SOLUTION ORAL at 08:10

## 2025-04-14 RX ADMIN — ACETAMINOPHEN 1000 MG: 10 INJECTION INTRAVENOUS at 23:02

## 2025-04-14 RX ADMIN — FENTANYL CITRATE 50 MCG: 50 INJECTION INTRAMUSCULAR; INTRAVENOUS at 12:17

## 2025-04-14 RX ADMIN — LEVETIRACETAM 1000 MG: 100 INJECTION, SOLUTION INTRAVENOUS at 11:40

## 2025-04-14 RX ADMIN — INSULIN LISPRO 1 UNITS: 100 INJECTION, SOLUTION INTRAVENOUS; SUBCUTANEOUS at 23:02

## 2025-04-14 RX ADMIN — FENTANYL CITRATE 50 MCG: 50 INJECTION INTRAMUSCULAR; INTRAVENOUS at 11:17

## 2025-04-14 RX ADMIN — DEXAMETHASONE SODIUM PHOSPHATE 10 MG: 10 INJECTION, SOLUTION INTRAMUSCULAR; INTRAVENOUS at 13:48

## 2025-04-14 RX ADMIN — PHENYLEPHRINE HYDROCHLORIDE 100 MCG: 10 INJECTION INTRAVENOUS at 12:22

## 2025-04-14 RX ADMIN — PROPOFOL 70 MCG/KG/MIN: 10 INJECTION, EMULSION INTRAVENOUS at 13:00

## 2025-04-14 RX ADMIN — ATORVASTATIN CALCIUM 20 MG: 20 TABLET, FILM COATED ORAL at 08:10

## 2025-04-14 RX ADMIN — ONDANSETRON 4 MG: 2 INJECTION INTRAMUSCULAR; INTRAVENOUS at 09:30

## 2025-04-14 RX ADMIN — INSULIN LISPRO 1 UNITS: 100 INJECTION, SOLUTION INTRAVENOUS; SUBCUTANEOUS at 18:04

## 2025-04-14 RX ADMIN — ONDANSETRON 4 MG: 2 INJECTION INTRAMUSCULAR; INTRAVENOUS at 13:48

## 2025-04-14 RX ADMIN — PROPOFOL 100 MCG/KG/MIN: 10 INJECTION, EMULSION INTRAVENOUS at 11:25

## 2025-04-14 RX ADMIN — PROPOFOL 50 MG: 10 INJECTION, EMULSION INTRAVENOUS at 14:21

## 2025-04-14 RX ADMIN — BISACODYL 10 MG: 10 SUPPOSITORY RECTAL at 08:10

## 2025-04-14 RX ADMIN — PHENYLEPHRINE HYDROCHLORIDE 100 MCG: 10 INJECTION INTRAVENOUS at 12:03

## 2025-04-14 RX ADMIN — SUGAMMADEX 200 MG: 100 INJECTION, SOLUTION INTRAVENOUS at 14:24

## 2025-04-14 RX ADMIN — SODIUM CHLORIDE 125 ML/HR: 0.9 INJECTION, SOLUTION INTRAVENOUS at 20:18

## 2025-04-14 RX ADMIN — PROPOFOL 50 MG: 10 INJECTION, EMULSION INTRAVENOUS at 11:39

## 2025-04-14 NOTE — NURSING NOTE
At approximately 0250, patient was found by Joshua Egan (nurse) to have ETT pulled out and hanging in mouth. At this time, propofol was turned off.     Prior to extubation, patient was weaning down on propofol. Wean began at 40 mcg/kg/min and was initiated at 0134. At the time of self-extubation, the patient was on 20 mcg/kg/min of propofol.     In addition, patient was restrained with soft wrist restraints bilaterally at the time of self-extubation. Restraints were found to still be in place after the event occurred.     Patient is not requiring reintubation or additional oxygen requirements at this time.

## 2025-04-14 NOTE — ASSESSMENT & PLAN NOTE
PPD 1 L frontal EVD placement   Subependymal lesions with large left basal ganglia suspicious for lymphoma   P/w confusion, short term memory loss.  Yesterday patient had worsening mental status change and CT head demonstrated progressive ventriculomegaly and likely trapped left lateral ventricle, ultimately a left frontal EVD was placed.  Patient unfortunately self extubated himself early this morning.    Imaging:  MRI Brain 4/11/25: Interval enlargement of the dominant left anterior basal ganglionic mass lesion with greater surrounding vasogenic edema and mass effect. 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 Differential considerations include lymphoma, multicentric high-grade glioma, and less likely metastasis. Correlation with CSF cytology is recommended.Small amount of intraventricular hemorrhage.Interval progression of the previously noted neoplastic disease with the dominant mass in the left anterior basal ganglia and numerous growing ependymal and intraventricular nodular lesions.  CTH 4/14/25:Status post placement of a left frontal approach ventriculostomy catheter with the tip terminating adjacent to the previously seen hyperdense mass near the foramen of De Oliveira. Small amount of intraventricular hemorrhage is noted within the left occipital horn with overall stable degree of hydrocephalus.Grossly stable hyperdense mass centered within the left basal ganglia. Correlate with prior contrast-enhanced MRI for additional findings.    Plan:  Continue to monitor neuro exam closely.  STAT CT head with any neurological decline including drop GCS of 2pts within 1 hr.  Hold all AC/AP medication  Medical management and pain control per primary team  Findings suggestive of CNS lymphoma.  Left EVD in place  Continue EVD at 15 mmhg.   Monitor output and ICP. Output 24ml/24H   ICP -4-5/24H. ICP in room 0.  CSF sent for cytology as  well as leukemia/lymphoma flow cytometry yesterday, continue to follow results   SBP <160  Patient has had multiple nondiagnostic LPs  Hem/onc requesting biopsy to confirm -tentative plan for left frontal endoscopic biopsy of left ventricular mass today with   Patient remains n.p.o.  Preop orders placed. Labs ordered as needed  Hold off on steroid management until Dx achieved.  Mobilize as tolerated.  DVT ppx: SCDs only at this time    Neurosurgery will continue to follow, tentative plan for left ventricular mass biopsy today, call with any further questions or concerns.

## 2025-04-14 NOTE — PHYSICAL THERAPY NOTE
Physical Therapy Cancellation Note    PT orders received chart review completed. Pt is currently pending OR for bx and not appropriate to participate in skilled PT at this time. PT will follow and treat as medically appropriate.     04/14/25 1024   Note Type   Note type Cancelled Session   Cancel Reasons Patient to operating room       Shikha Whitehead, PT

## 2025-04-14 NOTE — ANESTHESIA POSTPROCEDURE EVALUATION
Post-Op Assessment Note    CV Status:  Stable  Pain Score: 0    Pain management: adequate       Mental Status:  Awake   Hydration Status:  Euvolemic   PONV Controlled:  Controlled   Airway Patency:  Patent     Post Op Vitals Reviewed: Yes    No anethesia notable event occurred.    Staff: CRNA   Comments: patient transported to ICU on cardiac monitor, no events on transport, bedside report to RN who assumed care        Last Filed PACU Vitals:  Vitals Value Taken Time   Temp     Pulse 74    /75    Resp 20    SpO2 100%

## 2025-04-14 NOTE — PROGRESS NOTES
This is an attestation to Resident note    24 hour events:  Admitted to ICU with obstructive hydrocephalus s/p EVD placement  Hyperdense left basal ganglia with surrounding vasogenic edema   Plan for biopsy   Self extubated overnight stable from respiratory standpoint  Encephalopathy improved    Physical exam:  When I walked into room he was sitting up in bed with EVD open at 15 cm, tracked me briefly  Knows name, but not place or time  EOM grossly intact struggles to maintain command following throughout physical exam  Requires prompting to follow commands, strength grossly equal throughout     Impression:  Hyperdense left basal ganglia with surrounding vasogenic edema with nonspecific 12 mm hypodense right hepatic lobe lesion and nonspecific 4 mm right pulm lower lobe lesion  Obstructive hydrocephalus s/p EVD 4/13  Encephalopathy   Heterogenous nodular enlargement of left thyroid lobe    Plan:  EVD open at 15 cm, 24 cc out since placement - management per neurosurgery  Biopsy today  Holding further steroid dosing until after biopsy  Glucose control SSI  Restart DVT ppx post biopsy when cleared by neurosurgery  PO diet after biopsy, increase bowel regimen last bowel movement 4/7  Home PPI    Afternoon update:  S/p biopsy and EVD replacement   Consistent neuroexam with this am  HCT for am  Decadron taper  Slow EVD wean planned

## 2025-04-14 NOTE — ASSESSMENT & PLAN NOTE
Lab Results   Component Value Date    HGBA1C 6.6 (H) 02/22/2025       Recent Labs     04/13/25  2105 04/14/25  0002 04/14/25  0546 04/14/25  0602   POCGLU 180* 166* 152* 133       Blood Sugar Average: Last 72 hrs:  (P) [id number]

## 2025-04-14 NOTE — OCCUPATIONAL THERAPY NOTE
Occupational Therapy Cancel Note         Patient Name: Alexis Dennison  Today's Date: 4/14/2025 04/14/25 1023   OT Last Visit   OT Visit Date 04/14/25   Note Type   Note type Cancelled Session   Cancel Reasons Patient to operating room       OT orders received. Chart reviewed. Pt currently off the floor at OR for biopsy. Will continue to follow and see pt as appropriate and able.     Yoon Lubin MS, OTR/L

## 2025-04-14 NOTE — PLAN OF CARE
Problem: PAIN - ADULT  Goal: Verbalizes/displays adequate comfort level or baseline comfort level  Description: Interventions:- Encourage patient to monitor pain and request assistance- Assess pain using appropriate pain scale- Administer analgesics based on type and severity of pain and evaluate response- Implement non-pharmacological measures as appropriate and evaluate response- Notify physician/advanced practitioner if interventions unsuccessful or patient reports new pain  Outcome: Progressing     Problem: INFECTION - ADULT  Goal: Absence or prevention of progression during hospitalization  Description: INTERVENTIONS:- Assess and monitor for signs and symptoms of infection- Monitor lab/diagnostic results- Monitor all insertion sites, i.e. indwelling lines, tubes, and drains- [place] appropriate cooling/warming therapies per order- Administer medications as ordered- Instruct and encourage patient and family to use good hand hygiene technique- Identify and instruct in appropriate isolation precautions for identified infection/condition  Outcome: Progressing     Problem: SAFETY ADULT  Goal: Patient will remain free of falls  Description: INTERVENTIONS:- Educate patient/family on patient safety including physical limitations- Instruct patient to call for assistance with activity - Consult OT/PT to assist with strengthening/mobility - Keep Call bell within reach- Keep bed low and locked with side rails adjusted as appropriate- Keep care items and personal belongings within reach- Initiate and maintain comfort rounds- Make Fall Risk Sign visible to staff- Offer Toileting every 2 Hours, in advance of need- Initiate/Maintain bed/chair alarm- Obtain necessary fall risk management equipment.- Apply yellow socks and bracelet for high fall risk patients- Consider moving patient to room near nurses station  Outcome: Progressing  Goal: Maintain or return to baseline ADL function  Description: INTERVENTIONS:-  Assess  patient's ability to carry out ADLs; assess patient's baseline for ADL function and identify physical deficits which impact ability to perform ADLs (bathing, care of mouth/teeth, toileting, grooming, dressing, etc.)- Assess/evaluate cause of self-care deficits - Assess range of motion- Assess patient's mobility; develop plan if impaired- Assess patient's need for assistive devices and provide as appropriate- Encourage maximum independence but intervene and supervise when necessary- Involve family in performance of ADLs- Assess for home care needs following discharge - Consider OT consult to assist with ADL evaluation and planning for discharge- Provide patient education as appropriate  Outcome: Progressing  Goal: Maintains/Returns to pre admission functional level  Description: INTERVENTIONS:- Perform AM-PAC 6 Click Basic Mobility/ Daily Activity assessment daily.- Set and communicate daily mobility goal to care team and patient/family/caregiver. - Collaborate with rehabilitation services on mobility goals if consulted- Reposition patient every 2 hours.- Dangle patient 3 times a day- Stand patient 3 times a day- Ambulate patient 3 times a day- Out of bed to chair 3 times a day - Out of bed for meals 3 times a day- Out of bed for toileting- Record patient progress and toleration of activity level   Outcome: Progressing     Problem: DISCHARGE PLANNING  Goal: Discharge to home or other facility with appropriate resources  Description: INTERVENTIONS:- Identify barriers to discharge w/patient and caregiver- Arrange for needed discharge resources and transportation as appropriate- Identify discharge learning needs (meds, wound care, etc.)- Refer to Case Management Department for coordinating discharge planning if the patient needs post-hospital services based on physician/advanced practitioner order or complex needs related to functional status, cognitive ability, or social support system  Outcome: Progressing      Problem: Knowledge Deficit  Goal: Patient/family/caregiver demonstrates understanding of disease process, treatment plan, medications, and discharge instructions  Description: Complete learning assessment and assess knowledge base.Interventions:- Provide teaching at level of understanding- Provide teaching via preferred learning methods  Outcome: Progressing     Problem: NEUROSENSORY - ADULT  Goal: Achieves stable or improved neurological status  Description: INTERVENTIONS- Monitor and report changes in neurological status- Monitor vital signs such as temperature, blood pressure, glucose, and any other labs ordered - Initiate measures to prevent increased intracranial pressure- Monitor for seizure activity and implement precautions if appropriate    Outcome: Progressing  Goal: Remains free of injury related to seizures activity  Description: INTERVENTIONS- Maintain airway, patient safety  and administer oxygen as ordered- Monitor patient for seizure activity, document and report duration and description of seizure to physician/advanced practitioner- If seizure occurs,  ensure patient safety during seizure- Reorient patient post seizure- Seizure pads on all 4 side rails- Instruct patient/family to notify RN of any seizure activity including if an aura is experienced- Instruct patient/family to call for assistance with activity based on nursing assessment- Administer anti-seizure medications if ordered  Outcome: Progressing  Goal: Achieves maximal functionality and self care  Description: INTERVENTIONS- Monitor swallowing and airway patency with patient fatigue and changes in neurological status- Encourage and assist patient to increase activity and self care. - Encourage visually impaired, hearing impaired and aphasic patients to use assistive/communication devices  Outcome: Progressing     Problem: METABOLIC, FLUID AND ELECTROLYTES - ADULT  Goal: Glucose maintained within target range  Description: INTERVENTIONS:-  Monitor Blood Glucose as ordered- Assess for signs and symptoms of hyperglycemia and hypoglycemia- Administer ordered medications to maintain glucose within target range- Assess nutritional intake and initiate nutrition service referral as needed  Outcome: Progressing     Problem: Prexisting or High Potential for Compromised Skin Integrity  Goal: Skin integrity is maintained or improved  Description: INTERVENTIONS:- Identify patients at risk for skin breakdown- Assess and monitor skin integrity- Assess and monitor nutrition and hydration status- Monitor labs - Assess for incontinence - Turn and reposition patient- Assist with mobility/ambulation- Relieve pressure over bony prominences- Avoid friction and shearing- Provide appropriate hygiene as needed including keeping skin clean and dry- Evaluate need for skin moisturizer/barrier cream- Collaborate with interdisciplinary team - Patient/family teaching- Consider wound care consult   Outcome: Progressing     Problem: SAFETY,RESTRAINT: NV/NON-SELF DESTRUCTIVE BEHAVIOR  Goal: Remains free of harm/injury (restraion violent/non self-detsructive behavior)  Description: INTERVENTIONS:- Instruct patient/family regarding restraint use - Assess and monitor physiologic and psychological status - Provide interventions and comfort measures to meet assessed patient needs - Identify and implement measures to help patient regain control- Assess readiness for release of restraint   Outcome: Progressing  Goal: Returns to optimal restraint-free functioning  Description: INTERVENTIONS:- Assess the patient's behavior and symptoms that indicate continued need for restraint- Identify and implement measures to help patient regain control- Assess readiness for release of restraint   Outcome: Progressing

## 2025-04-14 NOTE — PROGRESS NOTES
Progress Note - Oncology-Medical   Name: Alexis Dennison 64 y.o. male I MRN: [id number]  Unit/Bed#: OR POOL I Date of Admission: 3/29/2025   Date of Service: 4/14/2025 I Hospital Day: 16    Assessment & Plan  Brain tumor (HCC)  Admitted 3/29 with worsening confusion since prior ED presentation and outpatient imaging  Seen at [facility] ED on 3/24 with 3 week history of altered mental status, memory difficulty  CTH 3/24 demonstrated multiple hyperdense ependyma/subependymal nodules  MRI brain 3/26 performed outpatient demonstrating multiple scattered foci of subependymal nodular enhancement with mild hydrocephalus, scattered nodular areas of enhancement in left anterior inferior basal ganglia dn foci of nodular enhancement along anteromedial aspect of left cerebral peduncle and left quirino  CTH 3/29 (this admission)  Stable subependymal nodules and left formamen of De Oliveira region hyperdense lesion with dilation of left lateral ventrical and minimal left to right midline shift  S/p LP 3/31  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   MRI Brain (4/11) showed progression of disease from 3/30 to 4/11 with interval enlargement of the dominant left anterior basal ganglionic mass with greater surrounding vasogenic edema and mass effect and persistent leptomeningeal and intraventricular seeding with obstructive hydrocephalus--current differential lymphoma versus high-grade glioma less likely metastasis  4/13: continued deterioration in mental status, CT Head 4/13 demonstrated left sided obstructive  hydrocephalus from lesion located adjacent to formamen of Monro, similar to CT on 4/3; blood products within occiptial horn of left lateral ventricle similar to MRI 4/11; left anterior basal ganglia mass with surrounding edema, regional mass effect and effacement of left suprasellar cistern, similar to MRI 4/11.  Transferred to ICU, intubation for airway protection and EVD placed per NSGY for progressive ventriculomegaly and likely trapped left lateral ventricle  Pt self-extubated on 4/14, was not reintubated prior to transport to OR    Plan:  Repeat CSF studies 4/13 pending s/p ventriculostomy placement  To OR on 4/13 for incisional/excisional tissue biopsy by neurosurgery service - this diagnosis is necessary for treatment planning for this patient  Avoid steroid use until diagnostic biopsy may be obtained    Encephalopathy  Most likely d/t brain mass, see interim A/P above  Pulmonary nodule  CTA 3/29 with nonspecific 4 mm RLL pulm nodule, recommendation for 3 month follow up  Liver lesion  CTA 3/29 with nonspecific 12mm hypodense right hepatic lesion, recommended MRI abdomen  MRI 3/30 with no suspicious hepatic lesions, demonstrated simple right hepatic lobe cyst    Subjective   Over the weekend, pt with continued deterioration in neuro status. CT head on 4/13 showed predominantly left sided obstructive hydrocephalus. Pt was transferred to ICU, intubated and EVD placed for hydrocephalus. Patient did self extubate early on 4/14, but did not require reintubation. Patient is pending transfer to the OR today for tissue biopsy.    I did speak with the patient's daughter Elizabeth via phone to update her from oncology perspective. She is aware that biopsy results are necessary for further treatment planning. Answered all of her questions.    Objective :  Temp:  [97.5 °F (36.4 °C)-98.6 °F (37 °C)] 97.6 °F (36.4 °C)  HR:  [48-84] 62  BP: ()/(63-81) 112/63  Resp:  [11-24] 22  SpO2:  [96 %-100 %] 98 %  O2 Device: None  (Room air)      Physical Exam  Constitutional:       General: He is awake. He is not in acute distress.  HENT:      Head:      Comments: EVD in place  Eyes:      Conjunctiva/sclera: Conjunctivae normal.   Cardiovascular:      Rate and Rhythm: Normal rate and regular rhythm.   Pulmonary:      Effort: Pulmonary effort is normal. No accessory muscle usage or respiratory distress.   Abdominal:      General: There is no distension.      Tenderness: There is no abdominal tenderness.   Skin:     General: Skin is warm and dry.   Neurological:      Mental Status: He is disoriented and confused.      Comments: Nonsensical verbal responses           Lab Results: I have reviewed the following results:CBC/BMP:   .     04/14/25  0449 04/14/25  0928   WBC  --  16.33*   HGB  --  15.6   HCT  --  44.0   PLT  --  202   SODIUM 137  --    K 4.2  --    CL 97  --    CO2 27  --    BUN 18  --    CREATININE 0.98  --    GLUC 152*  --    CAIONIZED 1.21  --    MG 2.2  --    PHOS 3.7  --       Lab Results   Component Value Date    K 4.2 04/14/2025    CL 97 04/14/2025    CO2 27 04/14/2025    BUN 18 04/14/2025    CREATININE 0.98 04/14/2025    GLUF 131 (H) 02/22/2025    CALCIUM 9.9 04/14/2025    AST 11 (L) 04/04/2025    ALT 22 04/04/2025    ALKPHOS 41 04/04/2025    EGFR 80 04/14/2025     Lab Results   Component Value Date    WBC 16.33 (H) 04/14/2025    HGB 15.6 04/14/2025    HCT 44.0 04/14/2025    MCV 87 04/14/2025     04/14/2025     Lab Results   Component Value Date    NEUTROABS 4.03 04/13/2025       Imaging Results Review: I reviewed radiology reports from this admission including: CT head and MRI brain.  Other Study Results Review: No additional pertinent studies reviewed.    Administrative Statements   I have spent a total time of 25 minutes in caring for this patient on the day of the visit/encounter including Patient and family education, Counseling / Coordination of care, Documenting in the medical record, Obtaining or reviewing  history  , and Communicating with other healthcare professionals .

## 2025-04-14 NOTE — CASE MANAGEMENT
Case Management Discharge Planning Note    Patient name Alexis Dennison  Location ICU 03/ICU 03 MRN [id number]  : 1960 Date 2025       Current Admission Date: 3/29/2025  Current Admission Diagnosis:Brain tumor (HCC)   Patient Active Problem List    Diagnosis Date Noted Date Diagnosed    Foot erythema 2025     Encephalopathy 2025     Constipation 2025     Ambulatory dysfunction 2025     Thyroid nodule 2025     Pulmonary nodule 2025     Liver lesion 2025     Brain tumor (HCC) 2025     Type 2 diabetes mellitus with hyperglycemia, without long-term current use of insulin (HCC) 2022     HTN, goal below 130/80 2022     Mixed hyperlipidemia 2022     Vitamin D deficiency 2022     NAFLD (nonalcoholic fatty liver disease) 2022     Fatigue 2022       LOS (days): 16  Geometric Mean LOS (GMLOS) (days): 11.1  Days to GMLOS:-4.6     OBJECTIVE:  Risk of Unplanned Readmission Score: 16.65         Current admission status: Inpatient   Preferred Pharmacy:   [facility]/pharmacy #[id number] - [place], [place] - [address] [address]  [address] [address]  Phone: [phone] Fax: [phone]    [facility] - [place], [place] - [address]  [address]  [facility] [address]  Phone: [phone] Fax: [phone]    Primary Care Provider: PATI Mckeon    Primary Insurance: BLUE CROSS  Secondary Insurance: CAPITAL    DISCHARGE DETAILS:                                                                                                 Additional Comments: 63yo male with basal ganglia mass which is enlarging, has increased confusion today, only oriented x1. To OR today for biopsy.Has external ventriculostomy. CM will follow pt postop. Anticipate slow EVD wean.

## 2025-04-14 NOTE — ASSESSMENT & PLAN NOTE
Admitted 3/29 with worsening confusion since prior ED presentation and outpatient imaging  Seen at [facility] ED on 3/24 with 3 week history of altered mental status, memory difficulty  CTH 3/24 demonstrated multiple hyperdense ependyma/subependymal nodules  MRI brain 3/26 performed outpatient demonstrating multiple scattered foci of subependymal nodular enhancement with mild hydrocephalus, scattered nodular areas of enhancement in left anterior inferior basal ganglia dn foci of nodular enhancement along anteromedial aspect of left cerebral peduncle and left quirino  CTH 3/29 (this admission)  Stable subependymal nodules and left formamen of De Oliveira region hyperdense lesion with dilation of left lateral ventrical and minimal left to right midline shift  S/p LP 3/31  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   MRI Brain (4/11) showed progression of disease from 3/30 to 4/11 with interval enlargement of the dominant left anterior basal ganglionic mass with greater surrounding vasogenic edema and mass effect and persistent leptomeningeal and intraventricular seeding with obstructive hydrocephalus--current differential lymphoma versus high-grade glioma less likely metastasis  4/13: continued deterioration in mental status, CT Head 4/13 demonstrated left sided obstructive hydrocephalus from lesion located adjacent to formamen of Monro, similar to CT on 4/3; blood products within occiptial horn of left lateral ventricle similar to MRI 4/11; left anterior basal ganglia mass with surrounding edema,  regional mass effect and effacement of left suprasellar cistern, similar to MRI 4/11.  Transferred to ICU, intubation for airway protection and EVD placed per NSGY for progressive ventriculomegaly and likely trapped left lateral ventricle  Pt self-extubated on 4/14, was not reintubated prior to transport to OR    Plan:  Repeat CSF studies 4/13 pending s/p ventriculostomy placement  To OR on 4/13 for incisional/excisional tissue biopsy by neurosurgery service - this diagnosis is necessary for treatment planning for this patient  Avoid steroid use until diagnostic biopsy may be obtained

## 2025-04-14 NOTE — PROGRESS NOTES
Progress Note - Neurosurgery   Name: Alexis Dennison 64 y.o. male I MRN: [id number]  Unit/Bed#: ICU 03 I Date of Admission: 3/29/2025   Date of Service: 4/14/2025 I Hospital Day: 16    Assessment & Plan  Brain tumor (HCC)  PPD 1 L frontal EVD placement   Subependymal lesions with large left basal ganglia suspicious for lymphoma   P/w confusion, short term memory loss.  Yesterday patient had worsening mental status change and CT head demonstrated progressive ventriculomegaly and likely trapped left lateral ventricle, ultimately a left frontal EVD was placed.  Patient unfortunately self extubated himself early this morning.    Imaging:  MRI Brain 4/11/25: Interval enlargement of the dominant left anterior basal ganglionic mass lesion with greater surrounding vasogenic edema and mass effect. 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 Differential considerations include lymphoma, multicentric high-grade glioma, and less likely metastasis. Correlation with CSF cytology is recommended.Small amount of intraventricular hemorrhage.Interval progression of the previously noted neoplastic disease with the dominant mass in the left anterior basal ganglia and numerous growing ependymal and intraventricular nodular lesions.  CTH 4/14/25:Status post placement of a left frontal approach ventriculostomy catheter with the tip terminating adjacent to the previously seen hyperdense mass near the foramen of De Oliveira. Small amount of intraventricular hemorrhage is noted within the left occipital horn with overall stable degree of hydrocephalus.Grossly stable hyperdense mass centered within the left basal ganglia. Correlate with prior contrast-enhanced MRI for additional findings.    Plan:  Continue to monitor neuro exam closely.  STAT CT head with any neurological decline including drop GCS of 2pts within 1 hr.  Hold all AC/AP medication  Medical  management and pain control per primary team  Findings suggestive of CNS lymphoma.  Left EVD in place  Continue EVD at 15 mmhg.   Monitor output and ICP. Output 24ml/24H   ICP -4-5/24H. ICP in room 0.  CSF sent for cytology as well as leukemia/lymphoma flow cytometry yesterday, continue to follow results   SBP <160  Patient has had multiple nondiagnostic LPs  Hem/onc requesting biopsy to confirm -tentative plan for left frontal endoscopic biopsy of left ventricular mass today with   Patient remains n.p.o.  Preop orders placed. Labs ordered as needed  Hold off on steroid management until Dx achieved.  Mobilize as tolerated.  DVT ppx: SCDs only at this time    Neurosurgery will continue to follow, tentative plan for left ventricular mass biopsy today, call with any further questions or concerns.     Type 2 diabetes mellitus with hyperglycemia, without long-term current use of insulin (HCC)  Lab Results   Component Value Date    HGBA1C 6.6 (H) 02/22/2025       Recent Labs     04/13/25  2105 04/14/25  0002 04/14/25  0546 04/14/25  0602   POCGLU 180* 166* 152* 133       Blood Sugar Average: Last 72 hrs:  (P) [id number]    Thyroid nodule    Pulmonary nodule    Liver lesion  MRI of abdomen confirming simple hepatic cyst.  Constipation    Ambulatory dysfunction    Encephalopathy    Foot erythema          Subjective Patient remains confused.  He is able to tell me where in the hospital with choices.  Otherwise he does not know the month or year or his birthday.  He offers no complaints.      Objective : Patient comfortably sitting up in bed, NAD.    Temp:  [97.5 °F (36.4 °C)-98.6 °F (37 °C)] 97.6 °F (36.4 °C)  HR:  [48-84] 76  BP: ()/(63-81) 111/64  Resp:  [11-24] 23  SpO2:  [96 %-100 %] 96 %  O2 Device: None (Room air)    I/O         04/12 0701 04/13 0700 04/13 0701 04/14 0700 04/14 0701  04/15 0700    P.O. 180      I.V. (mL/kg)  1403.1 (16.3)     Total Intake(mL/kg) 180 (2.1) 1403.1 (16.3)      Urine (mL/kg/hr)  1150 (0.6)     Drains  24     Total Output  1174     Net +180 +229.1            Unmeasured Urine Occurrence 2 x 1 x             General appearance: alert, appears stated age, cooperative and no distress  Head: Normocephalic, left frontal EVD in place with good waveform.  Dressing with old bloody drainage in place maintain at this time  Eyes: EOMI, PERRL, conjugate gaze  Neck: supple, symmetrical, trachea midline  Lungs: non labored breathing  Heart: regular heart rate  Neurologic:   Mental status: Alert, oriented x1-2, oriented to self and place with choices otherwise states the wrong month and year.  Cranial nerves: grossly intact (Cranial nerves II-XII) Limited due to mental status but no gross abnormality seen  Sensory: normal to light touch all extremities x 4  Motor: moving all extremities, able to squeeze hands bilaterally and release as well as wiggle toes to command  Reflexes: 2+ and symmetric, no Ilan's or clonus seen    Lab Results: I have reviewed the following results:  Recent Labs     04/13/25  1843 04/14/25  0449   WBC 7.31  --    HGB 14.3  --    HCT 41.7  --      --    SODIUM  --  137   K  --  4.2   CL  --  97   CO2  --  27   BUN  --  18   CREATININE  --  0.98   GLUC  --  152*   CAIONIZED  --  1.21   MG  --  2.2   PHOS  --  3.7   PTT 29  --    INR 0.98  --        Imaging Results Review: I personally reviewed the following image studies in PACS and associated radiology reports: MRI brain. My interpretation of the radiology images/reports is:  .      VTE Pharmacologic Prophylaxis: Sequential compression device (Venodyne)

## 2025-04-14 NOTE — PROGRESS NOTES
Critical Care Services- Self Extubation Progress Note   Alexis Dennison 64 y.o. male MRN: [id number]  Unit/Bed#: ICU 03 Encounter: [id number]    Date occurred: 4/14/2025   Time occurred (): 0250  Assigned Nurse: Jose Abad    Restraints: yes    Intubation Date: 04/13/2025  Vent settings:  Mode:                 Resp Rate (BPM): 14 BPM    VT (mL): 460    Pressure Control/Pinsp (cmH2O): 6    Insp time (S): 1    FIO2: 40%    PEEP (cmH20): 6    Weaning trial: no    Continuous medications:   multi-electrolyte, 75 mL/hr, Last Rate: 75 mL/hr (04/13/25 1446)  propofol, 5-50 mcg/kg/min, Last Rate: 20 mcg/kg/min (04/14/25 0217)      Sedation HOLD: no      PRN medications:   acetaminophen, 650 mg, Q6H PRN  aluminum-magnesium hydroxide-simethicone, 30 mL, Q4H PRN  fentaNYL, 50 mcg, Q1H PRN  ondansetron, 4 mg, Q6H PRN      Last date/ time () PRN sedation given: Was not given    RASS goal: 0 alert and calm    Did patient need reintubation: no  If NO what is their oxygen requirement: No    Was the patient receiving therapy, testing, or procedure at time of extubation: no  If YES, what type:     Was family notified: yes  Who was notified: Elizabeth Chester - daughter    Narrative of Events/Additional comments:   Patient's vent was alarming and nurse went over to assess patient and found patient with ETT tube in mouth hanging out of mouth, patient did self extubate.  Restraints were in place. Sedation was turned off when ETT tube was found out.  Not requiring any additional O2 requirements at this time.    Shayla Jamison PA-C

## 2025-04-14 NOTE — ANESTHESIA PROCEDURE NOTES
Arterial Line Insertion    Performed by: Kuldip Todd MD  Authorized by: Kuldip Todd MD  Patient identity confirmed: arm band and verbally with patient  Preparation: Patient was prepped and draped in the usual sterile fashion.  Indications: hemodynamic monitoring  Orientation:  Left  Location: radial artery  Procedure Details:      Needle gauge: 20    Number of attempts: 1    Post-procedure:  Post-procedure: dressing applied  Waveform: good waveform  Post-procedure CNS: normal  Patient tolerance: Patient tolerated the procedure well with no immediate complications

## 2025-04-14 NOTE — OP NOTE
"OPERATIVE REPORT  PATIENT NAME: Alexis Dennison    :  1960  MRN: [id number]  Pt Location: BE OR ROOM 09    SURGERY DATE: 2025    Surgeons and Role:  Paul Causey MD - Primary  Gabriele Fair MD - Assisting    Preop Diagnosis:  Brain mass [G93.89]  Brain compression  Cerebral edema  Hydrocephalus    Post-Op Diagnosis Codes:  Brain mass [G93.89]  Brain compression  Cerebral edema  Hydrocephalus    Procedure(s):  Left frontal endoscopic biopsy of ventricular mass and replacement of EVD    Specimen(s):  None    Estimated Blood Loss:   Minimal    Drains:  Externalized ventricular drain, left frontal    Anesthesia Type:   General    Operative Indications:  Brain mass [G93.89]  Brain compression  Cerebral edema  Hydrocephalus    Operative Findings:  Adequate lesional sample of abnormal tissue in left lateral ventricle obstructing foramen of Monro  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 All agreed to proceed. Patient was positioned. All pressure points were verified to be padded appropriately. Prior to starting the operation the surgical team paused and performed an audible timeout. The surgical, nursing, and anesthesia personnel agreed with the procedure to be performed.     The patient's head was placed in a Hutson 3-point head fixation system with attention toward adequacy of venous drainage and spinal alignment; the system was secured to the operative table. The facial recognition system was registered with stereotactic navigation. The preoperatively planned surgical trajectory was then mapped onto the patient's scalp. Stereotactic navigation was utilized during the operation. The surgical region was prepped and draped in the " "usual sterile fashion.    Previous left frontal incision was reopened. Gilmer hole was extended using a high speed air drill. The dura was perforated with electrocautery.     Endoscopic instruments were advanced through the endoscope to pre-operatively planned target site where specimens were obtained. The left lateral ventricle was continuously irrigated to maintain hemostasis. No sustained active bleeding.    Tissue was sent for frozen and permanent analyses. After pathology confirmed the specimen to be adequate, endoscopic instruments were all withdrawn slowly.     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    Prior to closure, hemostasis was achieved. The wound was copiously irrigated. All retracting devices were removed from the wound. All temporary implants were removed from the wound. Prior to closure, surgical count was correct and verified x 1.     Previously placed left frontal ventriculostomy catheter was replaced with a new system and anchored in place.    The wound was closed in the usual fashion. Galeal and cutaneous sutures were placed. The wound was then dressed.    The patient's head was removed from Eagar 3-point head fixation system.      At the end of the case, a sign out was performed whereby the anesthesia, surgical, and nursing teams re-confirmed the operation performed, any blood products to be distributed, specimens to be sent, or equipment issues. All parties agreed.    Stereotactic navigation was utilized during the operation.    Paul Causey    "

## 2025-04-14 NOTE — PROGRESS NOTES
Progress Note - Critical Care/ICU   Name: Alexis Dennison 64 y.o. male I MRN: [id number]  Unit/Bed#: ICU 03 I Date of Admission: 3/29/2025   Date of Service: 4/14/2025 I Hospital Day: 16       Assessment & Plan   Active Hospital Problems    Diagnosis Date Noted POA    Brain tumor (HCC) 03/29/2025 Yes    Foot erythema 04/12/2025 Unknown    Encephalopathy 04/02/2025 Unknown    Constipation 04/01/2025 Unknown    Ambulatory dysfunction 04/01/2025 Unknown    Thyroid nodule 03/30/2025 Unknown    Pulmonary nodule 03/30/2025 Unknown    Liver lesion 03/30/2025 Unknown    Type 2 diabetes mellitus with hyperglycemia, without long-term current use of insulin (HCC) 06/14/2022 Yes    Mixed hyperlipidemia 06/14/2022 Yes    HTN, goal below 130/80 06/14/2022 Yes      Resolved Hospital Problems    Diagnosis Date Noted Date Resolved POA    Abnormal CT scan 03/29/2025 03/30/2025 Yes     Neuro:   Diagnoses: L anterior basal ganglia mass with surrounding edema, obstructive hydrocephalus, small amount of IVH  4/11 MRI: Interval enlargement of the dominant left anterior basal ganglionic mass lesion with greater surrounding vasogenic edema and mass effect. 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  4/13 CT head: largely unchanged left sided obstructive hydrocephalus, blood products within the occipital horn of the left lateral ventricle is similar from the MRI brain 4/11. Left anterior basal ganglia mass with surrounding edema, regional mass effect, and effacement of the left suprasellar cistern, appears similar to MRI of the brain 4/11/2025  S/P Bedside EVD placement for obstructive hydrocephalus on 4/13    PLAN:   Neuro surgery following, appreciate reccs  EVD monitoring  Neuro checks every 1 hour  OR Biopsy 4/14  Holding steroid dosing until Biopsy  Repeat CT head with change in GCS > 2 points in 1 hour  Daily CAM-ICU, delirium precautions. Regulate  sleep/wake cycle.       CV:  Diagnoses: Hx of HTN, HLD  PLAN:   Home regimen: Hyzaar 100-25mg   Hold losartan/HCTZ  Continue statin  Goal normotension, Continue to monitor cardiopulmonary status maintain MAP>65 mmHg.     Pulm:  Diagnoses: No active issues  Patient self extubated overnight, intubated in preparation for EVD and Biopsy today  PLAN:  Monitor and Maintain for SpO2 > 92%.     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     MSK/Skin:  Diagnoses: No active Issues  PLAN:  Frequent turns/repositioning, offloading  PT/OT         Disposition: Critical care    ICU Core Measures     A: Assess, Prevent, and Manage Pain Has pain been assessed? Yes  Need for changes to pain regimen? No   B: Both SAT/SAT  N/A   C: Choice of Sedation RASS Goal: 0 Alert and Calm  Need for changes to sedation or analgesia regimen? No   D: Delirium CAM-ICU: Negative   E: Early Mobility  Plan for early mobility? Yes   F: Family Engagement Plan for family engagement today? Yes         Prophylaxis:  VTE Contraindicated secondary to: Brain Mass and Biopsy Today   Stress Ulcer  covered byomeprazole (PriLOSEC) 20 mg delayed release capsule [[id number]] (Long-Term Med), pantoprazole (PROTONIX) injection 40 mg [[id number]]         24  Hour Events : Patient self-extubated overnight, however, continued to breath spontaneously without difficulty. Patient had no acute overnight events otherwise. Patient has mild improvement in neuro exam, following commands with prompting.    Subjective       Objective :                   Vitals I/O      Most Recent Min/Max in 24hrs   Temp 97.6 °F (36.4 °C) Temp  Min: 97.5 °F (36.4 °C)  Max: 98.6 °F (37 °C)   Pulse 76 Pulse  Min: 48  Max: 84   Resp (!) 23 Resp  Min: 11  Max: 24   /64 BP  Min: 96/64  Max: 140/73   O2 Sat 96 % SpO2  Min: 96 %  Max: 100 %      Intake/Output Summary (Last 24 hours) at 2025 0842  Last data filed at 2025 0800  Gross per 24 hour   Intake 1403.1 ml   Output 1774 ml   Net -370.9 ml       Diet NPO    Invasive Monitoring           Physical Exam   Physical Exam  Vitals reviewed.   Eyes:      Extraocular Movements: Extraocular movements intact.      Pupils: Pupils are equal, round, and reactive to light.   Skin:     General: Skin is warm and dry.   HENT:      Head:      Comments: EVD  Cardiovascular:      Rate and Rhythm: Normal rate and regular rhythm.      Pulses: Normal pulses.   Musculoskeletal:         General: Normal range of motion.      Right lower leg: No edema.      Left lower leg: No edema.   Abdominal: General: Bowel sounds are normal. There is no distension.      Palpations: Abdomen is soft.      Tenderness: There is no abdominal tenderness.   Constitutional:       General: He is not in acute distress.  Pulmonary:      Effort: Pulmonary effort is normal.      Breath sounds: Normal breath sounds.   Neurological:      Comments: Patient is AAOx1, unable to inform person, , but place with prompting. Follows Motor Commands with prompting          Diagnostic Studies        Lab Results: I have reviewed the following results:     Medications:  Scheduled PRN   atorvastatin, 20 mg, Daily  bisacodyl, 10 mg, Daily  cefazolin, 2,000 mg, Once  chlorhexidine, 15 mL, Q12H  [facility]  chlorhexidine, 15 mL, Once  Cholecalciferol, 2,000 Units, Daily  vitamin B-12, 1,000 mcg, Daily  [Held by provider] losartan, 100 mg, Daily   And  [Held by provider] hydroCHLOROthiazide, 25 mg, Daily  insulin lispro, 1-6 Units, Q6H  pantoprazole, 40 mg, Q24H CAIT  polyethylene glycol, 17 g, Daily  senna, 2 tablet, HS      acetaminophen, 650 mg, Q6H PRN  aluminum-magnesium hydroxide-simethicone, 30 mL, Q4H PRN  fentaNYL, 50 mcg, Q1H PRN  ondansetron, 4 mg, Q6H PRN       Continuous    multi-electrolyte, 75 mL/hr, Last Rate: 75 mL/hr (04/14/25 0530)  propofol, 5-50 mcg/kg/min, Last Rate: Stopped (04/14/25 0305)         Labs:   CBC    Recent Labs     04/13/25  0510 04/13/25  1843   WBC 7.22 7.31   HGB 16.4 14.3   HCT 48.0 41.7    175     BMP    Recent Labs     04/13/25  0510 04/14/25  0449   SODIUM 140 137   K 4.5 4.2    97   CO2 31 27   AGAP 9 13   BUN 16 18   CREATININE 0.97 0.98   CALCIUM 10.1 9.9       Coags    Recent Labs     04/13/25  1843   INR 0.98   PTT 29        Additional Electrolytes  Recent Labs     04/14/25  0449   MG 2.2   PHOS 3.7   CAIONIZED 1.21          Blood Gas    No recent results  Recent Labs     04/13/25  1257   PHVEN 7.393   CEX8UIH 43.8   PO2VEN 39.3   OMJ7LKK 26.1   BEVEN 0.8   [id number] 72.9    LFTs  No recent results    Infectious  No recent results  Glucose  Recent Labs     04/13/25  0510 04/14/25  0449   GLUC 117 152*

## 2025-04-14 NOTE — PLAN OF CARE
Problem: PAIN - ADULT  Goal: Verbalizes/displays adequate comfort level or baseline comfort level  Description: Interventions:- Encourage patient to monitor pain and request assistance- Assess pain using appropriate pain scale- Administer analgesics based on type and severity of pain and evaluate response- Implement non-pharmacological measures as appropriate and evaluate response- Notify physician/advanced practitioner if interventions unsuccessful or patient reports new pain  Outcome: Progressing     Problem: INFECTION - ADULT  Goal: Absence or prevention of progression during hospitalization  Description: INTERVENTIONS:- Assess and monitor for signs and symptoms of infection- Monitor lab/diagnostic results- Monitor all insertion sites, i.e. indwelling lines, tubes, and drains- Lititz appropriate cooling/warming therapies per order- Administer medications as ordered- Instruct and encourage patient and family to use good hand hygiene technique- Identify and instruct in appropriate isolation precautions for identified infection/condition  Outcome: Progressing     Problem: SAFETY ADULT  Goal: Patient will remain free of falls  Description: INTERVENTIONS:- Educate patient/family on patient safety including physical limitations- Instruct patient to call for assistance with activity - Consult OT/PT to assist with strengthening/mobility - Keep Call bell within reach- Keep bed low and locked with side rails adjusted as appropriate- Keep care items and personal belongings within reach- Initiate and maintain comfort rounds- Make Fall Risk Sign visible to staff- Offer Toileting every 2 Hours, in advance of need- Initiate/Maintain bed/chair alarm- Obtain necessary fall risk management equipment.- Apply yellow socks and bracelet for high fall risk patients- Consider moving patient to room near nurses station  Outcome: Progressing  Goal: Maintain or return to baseline ADL function  Description: INTERVENTIONS:-  Assess  patient's ability to carry out ADLs; assess patient's baseline for ADL function and identify physical deficits which impact ability to perform ADLs (bathing, care of mouth/teeth, toileting, grooming, dressing, etc.)- Assess/evaluate cause of self-care deficits - Assess range of motion- Assess patient's mobility; develop plan if impaired- Assess patient's need for assistive devices and provide as appropriate- Encourage maximum independence but intervene and supervise when necessary- Involve family in performance of ADLs- Assess for home care needs following discharge - Consider OT consult to assist with ADL evaluation and planning for discharge- Provide patient education as appropriate  Outcome: Progressing  Goal: Maintains/Returns to pre admission functional level  Description: INTERVENTIONS:- Perform AM-PAC 6 Click Basic Mobility/ Daily Activity assessment daily.- Set and communicate daily mobility goal to care team and patient/family/caregiver. - Collaborate with rehabilitation services on mobility goals if consulted- Reposition patient every 2 hours.- Dangle patient 3 times a day- Stand patient 3 times a day- Ambulate patient 3 times a day- Out of bed to chair 3 times a day - Out of bed for meals 3 times a day- Out of bed for toileting- Record patient progress and toleration of activity level   Outcome: Progressing     Problem: DISCHARGE PLANNING  Goal: Discharge to home or other facility with appropriate resources  Description: INTERVENTIONS:- Identify barriers to discharge w/patient and caregiver- Arrange for needed discharge resources and transportation as appropriate- Identify discharge learning needs (meds, wound care, etc.)- Refer to Case Management Department for coordinating discharge planning if the patient needs post-hospital services based on physician/advanced practitioner order or complex needs related to functional status, cognitive ability, or social support system  Outcome: Progressing      Problem: Knowledge Deficit  Goal: Patient/family/caregiver demonstrates understanding of disease process, treatment plan, medications, and discharge instructions  Description: Complete learning assessment and assess knowledge base.Interventions:- Provide teaching at level of understanding- Provide teaching via preferred learning methods  Outcome: Progressing     Problem: NEUROSENSORY - ADULT  Goal: Achieves stable or improved neurological status  Description: INTERVENTIONS- Monitor and report changes in neurological status- Monitor vital signs such as temperature, blood pressure, glucose, and any other labs ordered - Initiate measures to prevent increased intracranial pressure- Monitor for seizure activity and implement precautions if appropriate    Outcome: Progressing  Goal: Remains free of injury related to seizures activity  Description: INTERVENTIONS- Maintain airway, patient safety  and administer oxygen as ordered- Monitor patient for seizure activity, document and report duration and description of seizure to physician/advanced practitioner- If seizure occurs,  ensure patient safety during seizure- Reorient patient post seizure- Seizure pads on all 4 side rails- Instruct patient/family to notify RN of any seizure activity including if an aura is experienced- Instruct patient/family to call for assistance with activity based on nursing assessment- Administer anti-seizure medications if ordered  Outcome: Progressing  Goal: Achieves maximal functionality and self care  Description: INTERVENTIONS- Monitor swallowing and airway patency with patient fatigue and changes in neurological status- Encourage and assist patient to increase activity and self care. - Encourage visually impaired, hearing impaired and aphasic patients to use assistive/communication devices  Outcome: Progressing     Problem: METABOLIC, FLUID AND ELECTROLYTES - ADULT  Goal: Glucose maintained within target range  Description: INTERVENTIONS:-  Monitor Blood Glucose as ordered- Assess for signs and symptoms of hyperglycemia and hypoglycemia- Administer ordered medications to maintain glucose within target range- Assess nutritional intake and initiate nutrition service referral as needed  Outcome: Progressing     Problem: Prexisting or High Potential for Compromised Skin Integrity  Goal: Skin integrity is maintained or improved  Description: INTERVENTIONS:- Identify patients at risk for skin breakdown- Assess and monitor skin integrity- Assess and monitor nutrition and hydration status- Monitor labs - Assess for incontinence - Turn and reposition patient- Assist with mobility/ambulation- Relieve pressure over bony prominences- Avoid friction and shearing- Provide appropriate hygiene as needed including keeping skin clean and dry- Evaluate need for skin moisturizer/barrier cream- Collaborate with interdisciplinary team - Patient/family teaching- Consider wound care consult   Outcome: Progressing     Problem: SAFETY,RESTRAINT: NV/NON-SELF DESTRUCTIVE BEHAVIOR  Goal: Remains free of harm/injury (restraint for non violent/non self-detsructive behavior)  Description: INTERVENTIONS:- Instruct patient/family regarding restraint use - Assess and monitor physiologic and psychological status - Provide interventions and comfort measures to meet assessed patient needs - Identify and implement measures to help patient regain control- Assess readiness for release of restraint   Reactivated  Goal: Returns to optimal restraint-free functioning  Description: INTERVENTIONS:- Assess the patient's behavior and symptoms that indicate continued need for restraint- Identify and implement measures to help patient regain control- Assess readiness for release of restraint   Reactivated

## 2025-04-14 NOTE — UTILIZATION REVIEW
Continued Stay Review    Date: 4/14                          Current Patient Class: Inpatient  Current Level of Care: Critical Care    HPI:64 y.o. 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 Neuro checks q1h. OR Biopsy 4/14. Hold steroid dosing until Biopsy.  Continue statin. Hold losartan/HCTZ.   Pt self extubated overnight, intubated in preparation for EVD and Biopsy today     Per Neurosurgery; Left EVD in place. Continue at 15 mmhg. Monitor output and ICP. Output 24ml/24H   ICP -4-5/24H. ICP in room 0. CSF sent for cytology as well as leukemia/lymphoma flow cytometry yesterday, continue to follow results   SBP <160. NPO. Hold off steriod management.   CTH 4/14/25:Status post placement of a left frontal approach ventriculostomy catheter with the tip terminating adjacent to the previously seen hyperdense mass near the foramen of De Oliveira. Small amount of intraventricular hemorrhage is noted within the left occipital horn with overall stable degree of hydrocephalus.Grossly stable hyperdense mass centered within the left basal ganglia. Correlate with prior contrast-enhanced MRI for additional finding     Medications:   Scheduled Medications:  atorvastatin, 20 mg, Oral, Daily  bisacodyl, 10 mg, Rectal, Daily  [Transfer Hold] chlorhexidine, 15 mL, Mouth/Throat, Q12H CAIT  [Transfer Hold] chlorhexidine, 15 mL, Swish & Spit, Once  Cholecalciferol, 2,000 Units, Oral, Daily  vitamin B-12, 1,000 mcg, Oral, Daily  [Held by provider] losartan, 100 mg, Oral, Daily   And  [Held by provider] hydroCHLOROthiazide, 25 mg, Oral, Daily  [Transfer Hold] insulin lispro, 1-6 Units, Subcutaneous, Q6H  [Transfer Hold] pantoprazole, 40 mg, Oral, Early Morning  polyethylene glycol,  17 g, Oral, Daily  senna, 2 tablet, Oral, HS      Continuous IV Infusions:  multi-electrolyte, 75 mL/hr, Intravenous, Continuous      PRN Meds:  acetaminophen, 650 mg, Oral, Q6H PRN  aluminum-magnesium hydroxide-simethicone, 30 mL, Oral, Q4H PRN  [Transfer Hold] fentaNYL, 50 mcg, Intravenous, Q1H PRN  ondansetron, 4 mg, Intravenous, Q6H PRN      Discharge Plan: TBD    Vital Signs (last 3 days)       Date/Time Temp Pulse Resp BP MAP (mmHg) SpO2 O2 Device Patient Position - Orthostatic VS ICP Mean (mmHg) CPP Cuff-Calculated (mmHg) Yeni Coma Scale Score Pain    04/14/25 1033 98.4 °F (36.9 °C) -- -- -- -- -- None (Room air) -- -- -- -- --    04/14/25 1000 -- 66 22 -- -- 97 % -- -- -1 mmHg -- 14 --    04/14/25 0900 -- 60 12 -- -- 97 % -- -- -3 mmHg -- 14 --    04/14/25 0840 -- -- -- -- -- -- None (Room air) -- -- -- -- --    04/14/25 0800 97.6 °F (36.4 °C) 62 22 112/63 79 98 % None (Room air) Lying 0 mmHg 79 14 No Pain    04/14/25 0700 -- -- -- -- -- -- -- -- -- -- 14 --    04/14/25 0600 -- 76 23 111/64 82 96 % -- -- -3 mmHg 85 14 --    04/14/25 0500 -- 56 15 118/74 90 97 % -- -- -4 mmHg 94 14 --    04/14/25 0400 97.9 °F (36.6 °C) 56 22 114/67 88 97 % None (Room air) Lying -4 mmHg 92 14 No Pain    04/14/25 0300 -- 64 24 136/75 101 98 % -- -- -4 mmHg 105 13 --    04/14/25 0200 -- 48 12 108/65 79 100 % -- -- 4 mmHg 75 5 --    04/14/25 0100 -- 54 12 96/64 74 99 % -- -- 5 mmHg 69 5 --    04/14/25 0000 97.5 °F (36.4 °C) 54 12 102/63 76 100 % Ventilator Lying 5 mmHg 71 5 --    04/13/25 2300 -- 54 11 119/81 89 100 % -- -- 4 mmHg 85 5 --    04/13/25 2200 -- 54 11 131/79 96 100 % -- -- 4 mmHg 92 5 --    04/13/25 2100 -- 56 12 110/70 82 100 % -- -- 4 mmHg 78 5 --    04/13/25 2000 97.7 °F (36.5 °C) 54 11 120/73 88 100 % Ventilator Lying 4 mmHg 84 3 --    04/13/25 1900 -- 56 12 115/73 85 100 % -- -- 2 mmHg 83 -- --    04/13/25 1800 -- 56 12 113/69 84 100 % -- -- 1 mmHg 83 -- --    04/13/25 1700 -- 58 12 103/66 77 99 % -- -- 2  mmHg 75 -- --    04/13/25 1600 98.6 °F (37 °C) 62 21 114/64 89 98 % -- -- -- -- -- --    04/13/25 1552 -- -- -- -- -- -- -- -- -- -- -- Med Not Given for Pain - for MAR use only    04/13/25 1500 -- 60 12 105/65 84 100 % -- -- -- -- -- --    04/13/25 1445 -- 64 12 110/72 84 100 % -- -- -- -- -- --    04/13/25 1440 -- 62 12 101/71 80 100 % -- -- -- -- -- --    04/13/25 1430 -- 68 16 133/80 101 99 % -- -- -- -- 7 --    04/13/25 1425 -- 84 20 140/73 92 100 % -- -- -- -- -- --    04/13/25 1423 -- 62 21 112/63 77 98 % -- -- -- -- -- --    04/13/25 1422 -- 58 19 118/74 82 98 % -- -- -- -- -- --    04/13/25 1415 -- 56 20 111/69 84 96 % -- -- -- -- 13 --    04/13/25 1413 -- 58 20 111/71 83 96 % -- -- -- -- -- --    04/13/25 1200 -- -- -- -- -- -- -- -- -- -- 13 --    04/13/25 09:38:15 -- 53 -- 130/77 95 97 % None (Room air) -- -- -- 14 No Pain    04/13/25 0735 98.2 °F (36.8 °C) 49 14 111/61 78 98 % -- -- -- -- -- --    04/13/25 0400 97.9 °F (36.6 °C) 49 16 135/66 89 -- -- Lying -- -- -- --    04/12/25 21:49:45 98.2 °F (36.8 °C) 58 16 122/71 88 90 % -- Lying -- -- -- --    04/12/25 2000 -- -- -- -- -- 95 % None (Room air) -- -- -- 14 No Pain    04/12/25 15:41:16 97.9 °F (36.6 °C) 54 16 120/66 84 96 % -- -- -- -- -- --    04/12/25 1231 98.2 °F (36.8 °C) -- 16 121/60 -- -- None (Room air) Lying -- -- -- No Pain    04/12/25 09:00:44 -- 49 -- 123/74 90 97 % -- -- -- -- -- --    04/12/25 0800 -- -- -- -- -- -- -- -- -- -- 14 No Pain    04/12/25 0719 98 °F (36.7 °C) -- 18 123/73 -- -- None (Room air) Lying -- -- -- No Pain    04/12/25 0000 97.9 °F (36.6 °C) 57 16 126/71 89 -- -- Lying -- -- -- --    04/11/25 2000 -- -- -- -- -- -- -- -- -- -- 14 No Pain    04/11/25 16:21:40 -- 55 18 117/68 84 99 % -- -- -- -- -- --    04/11/25 1100 -- -- -- -- -- -- -- -- -- -- -- No Pain    04/11/25 0800 -- -- -- -- -- -- -- -- -- -- 14 --    04/11/25 07:16:21 97.8 °F (36.6 °C) 51 15 117/66 83 96 % -- -- -- -- -- --          Weight (last 2 days)        None            Pertinent Labs/Diagnostic Results:   Radiology:  XR chest portable ICU   Final Interpretation by Fareed Roman MD (04/14 3232)      No acute cardiopulmonary disease. Appropriately positioned endotracheal tube.            Workstation performed: [id number]         CT head wo contrast   Final Interpretation by Len Justice MD (04/13 1722)      Status post placement of a left frontal approach ventriculostomy catheter with the tip terminating adjacent to the previously seen hyperdense mass near the foramen of De Oliveira. Small amount of intraventricular hemorrhage is noted within the left occipital    horn with overall stable degree of hydrocephalus.      Grossly stable hyperdense mass centered within the left basal ganglia. Correlate with prior contrast-enhanced MRI for additional findings.                  Workstation performed: [id number]         CT head wo contrast   Final Interpretation by Daniel Gan MD (04/13 4416)      Predominantly left sided obstructive hydrocephalus from lesion located adjacent to the foramen of Monro is not significantly changed from 4/3/2025.      Blood products within the occipital horn of the left lateral ventricle is similar from MRI of the brain 4/11/2025.      Left anterior basal ganglia mass with surrounding edema, regional mass effect, and effacement of the left suprasellar cistern, appears similar to MRI of the brain 4/11/2025. Please see that report for further characterization of total extent of tumor.                  Resident: CINTHYA FAJARDO I, the attending radiologist, have reviewed the images and agree with the final report above.      Workstation performed: [id number]         MRI Brain BT w wo Contrast   Final Interpretation by Pallav N Shah, MD (04/11 2032)   Addendum (preliminary) 1 of 1 by Pallav N Shah, MD (04/11 2032)   ADDENDUM:      I also verbally communicated these findings to the covering hospitalist,    Dr. Laurence Restrepo at 7:20 p.m.       Final   Interval enlargement of the dominant left anterior basal ganglionic mass lesion with greater surrounding vasogenic edema and mass effect. 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 Differential considerations include lymphoma, multicentric high-grade glioma, and less likely metastasis. Correlation with CSF cytology is recommended.      Small amount of intraventricular hemorrhage.      Interval progression of the previously noted neoplastic disease with the dominant mass in the left anterior basal ganglia and numerous growing ependymal and intraventricular nodular lesions.      The study was marked in EPIC for immediate notification.         Workstation performed: [id number]         FL lumbar puncture diagnostic   Final Interpretation by Grant Galloway DO (04/07 1257)      Successful fluoroscopically guided lumbar puncture.         Workstation performed: [id number]         CT head wo contrast   Final Interpretation by Miranda Burr MD (04/03 1124)      Redemonstrated hyperattenuating subependymal masses better characterized on recent MR. Interval increased ventricular caliber, left greater than right in comparison to recent CT dated 3/29/2025 concerning for worsening hydrocephalus. Recommend    neurosurgical consultation.      The study was marked in EPIC for immediate notification.         Workstation performed: [id number]         FL IN-patient lumbar puncture   Final Interpretation by Elpidio Martinez MD (03/31 1448)      Successful fluoroscopically guided lumbar puncture.      Opening pressure: 15 cm of H20               Workstation performed: [id number]         MRI abdomen w wo contrast   Final Interpretation by Oswaldo Sewell MD (03/31 1021)      1.  No suspicious hepatic lesions. There is a simple right hepatic lobe cyst.   2.  Mild diffuse hepatic steatosis.         Workstation performed:  [id number]         CT head wo contrast   Final Interpretation by Lonnie Arellano MD (03/29 2224)      Stable subependymal nodules and left foramen of De Oliveira region hyperdense lesion again seen with associated mild symmetrical dilatation of the left lateral ventricle and minimal left to right midline shift. The overall findings are unchanged compared to    the prior study. No acute intracranial hemorrhage or evidence of acute cerebral infarction.                  Workstation performed: [id number]         CTA chest abdomen pelvis w wo contrast   Final Interpretation by Oswaldo Sewell MD (03/29 2126)      1.  There is a nonspecific 12 mm hypodense lesion within the right hepatic lobe. While this may represent a hemangioma, a metastatic lesion can not be excluded. This is suboptimally evaluated on the current exam, due to arterial timing of the contrast    bolus and background diffuse hepatic steatosis. Further evaluation by gadolinium enhanced MRI of the abdomen is recommended.   2.  Nonspecific 4 mm right lower lobe pulmonary nodule. In the setting of a known malignancy, a 3-month follow-up chest CT could be performed for further evaluation.   3.  Heterogeneous nodular enlargement of the left thyroid lobe. A nonemergent thyroid ultrasound follow-up is recommended.      4.  Please refer to the report body for description of other incidental, chronic and/or benign findings.      The study was marked in EPIC for significant notification.         Workstation performed: [id number]           Cardiology:  ECG 12 lead   Final Result by Anthony Anderson MD (03/29 2100)   Age and gender specific ECG analysis    Normal sinus rhythm   Possible Inferior infarct (cited on or before 24-Mar-2025)   Abnormal ECG   Confirmed by Anthony Anderson ([id number]) on 3/29/2025 9:00:53 PM        GI:  No orders to display           Results from last 7 days   Lab Units 04/14/25  0928 04/13/25  1843 04/13/25  0510 04/12/25  1139 04/11/25  0445  "  WBC Thousand/uL 16.33* 7.31 7.22 7.40 6.73   HEMOGLOBIN g/dL 15.6 14.3 16.4 16.2 15.1   HEMATOCRIT % 44.0 41.7 48.0 46.2 44.7   PLATELETS Thousands/uL 202 175 194 186 188   TOTAL NEUT ABS Thousands/µL  --   --  4.03 4.18 3.57         Results from last 7 days   Lab Units 04/14/25  0449 04/13/25  0510 04/11/25  0445 04/09/25  0458 04/08/25  0539   SODIUM mmol/L 137 140 140 143 140   POTASSIUM mmol/L 4.2 4.5 3.9 4.2 4.0   CHLORIDE mmol/L 97 100 103 104 105   CO2 mmol/L 27 31 30 30 28   ANION GAP mmol/L 13 9 7 9 7   BUN mg/dL 18 16 20 16 16   CREATININE mg/dL 0.98 0.97 0.94 0.98 0.98   EGFR ml/min/1.73sq m 80 81 84 80 80   CALCIUM mg/dL 9.9 10.1 9.3 9.6 9.4   CALCIUM, IONIZED mmol/L 1.21  --   --   --   --    MAGNESIUM mg/dL 2.2  --   --   --   --    PHOSPHORUS mg/dL 3.7  --   --   --   --          Results from last 7 days   Lab Units 04/14/25  0602 04/14/25  0546 04/14/25  0002 04/13/25  2105 04/13/25  1848 04/13/25  1119 04/13/25  0638 04/12/25  2036 04/12/25  1619 04/12/25  1051 04/12/25  0630 04/11/25 2033   POC GLUCOSE mg/dl 133 152* 166* 180* 198* 152* 105 142* 121 129 101 177*     Results from last 7 days   Lab Units 04/14/25  0449 04/13/25  0510 04/11/25  0445 04/09/25  0458 04/08/25  0539   GLUCOSE RANDOM mg/dL 152* 117 111 115 109             No results found for: \"BETA-HYDROXYBUTYRATE\"       Results from last 7 days   Lab Units 04/13/25  1257   PH KRISTEN  7.393   PCO2 KRISTEN mm Hg 43.8   PO2 KRISTEN mm Hg 39.3   HCO3 KRISTEN mmol/L 26.1   BASE EXC KRISTEN mmol/L 0.8   O2 CONTENT KRISTEN ml/dL 16.6   O2 HGB, VENOUS % 72.9                     Results from last 7 days   Lab Units 04/13/25  1843   PROTIME seconds 13.3   INR  0.98   PTT seconds 29           Network Utilization Review Department  ATTENTION: Please call with any questions or concerns to [phone] and carefully listen to the prompts so that you are directed to the right person. All voicemails are confidential.   For Discharge needs, contact Care Management DC " Support Team at [phone] opt. 2  Send all requests for admission clinical reviews, approved or denied determinations and any other requests to dedicated fax number below belonging to the campus where the patient is receiving treatment. List of dedicated fax numbers for the Facilities:  FACILITY NAME UR FAX NUMBER   ADMISSION DENIALS (Administrative/Medical Necessity) [phone]   DISCHARGE SUPPORT TEAM (NETWORK) [phone]   PARENT CHILD HEALTH (Maternity/NICU/Pediatrics) [phone]   [facility] [phone]   [facility] [phone]   [facility] [phone]   [facility] [phone]   [facility] [phone]   [facility] [phone]   [facility] [phone]   [facility] [phone]   [facility] [phone]   Critical access hospital [phone]   [facility] [phone]   [facility] [phone]

## 2025-04-14 NOTE — TREATMENT PLAN
Was reached out by ICU staff in regards to patient's EVD not having a great waveform postoperatively and not draining.  Tubing was noted to have a lot of air.  EVD was dropped to the ground and EVD started draining.  Had good waveform ICP 0-1.  ICU attending in room.  Neurosurgery will continue to follow, plan for CT head tomorrow or sooner if drop in GCS of 2 points within 1 hour.  Call with any further questions or concerns.

## 2025-04-14 NOTE — PLAN OF CARE
Problem: PAIN - ADULT  Goal: Verbalizes/displays adequate comfort level or baseline comfort level  Description: Interventions:- Encourage patient to monitor pain and request assistance- Assess pain using appropriate pain scale- Administer analgesics based on type and severity of pain and evaluate response- Implement non-pharmacological measures as appropriate and evaluate response- Notify physician/advanced practitioner if interventions unsuccessful or patient reports new pain  Outcome: Progressing     Problem: INFECTION - ADULT  Goal: Absence or prevention of progression during hospitalization  Description: INTERVENTIONS:- Assess and monitor for signs and symptoms of infection- Monitor lab/diagnostic results- Monitor all insertion sites, i.e. indwelling lines, tubes, and drains- [place] appropriate cooling/warming therapies per order- Administer medications as ordered- Instruct and encourage patient and family to use good hand hygiene technique- Identify and instruct in appropriate isolation precautions for identified infection/condition  Outcome: Progressing     Problem: SAFETY ADULT  Goal: Patient will remain free of falls  Description: INTERVENTIONS:- Educate patient/family on patient safety including physical limitations- Instruct patient to call for assistance with activity - Consult OT/PT to assist with strengthening/mobility - Keep Call bell within reach- Keep bed low and locked with side rails adjusted as appropriate- Keep care items and personal belongings within reach- Initiate and maintain comfort rounds- Make Fall Risk Sign visible to staff- Offer Toileting every 2 Hours, in advance of need- Initiate/Maintain bed/chair alarm- Obtain necessary fall risk management equipment.- Apply yellow socks and bracelet for high fall risk patients- Consider moving patient to room near nurses station  Outcome: Progressing  Goal: Maintain or return to baseline ADL function  Description: INTERVENTIONS:-  Assess  patient's ability to carry out ADLs; assess patient's baseline for ADL function and identify physical deficits which impact ability to perform ADLs (bathing, care of mouth/teeth, toileting, grooming, dressing, etc.)- Assess/evaluate cause of self-care deficits - Assess range of motion- Assess patient's mobility; develop plan if impaired- Assess patient's need for assistive devices and provide as appropriate- Encourage maximum independence but intervene and supervise when necessary- Involve family in performance of ADLs- Assess for home care needs following discharge - Consider OT consult to assist with ADL evaluation and planning for discharge- Provide patient education as appropriate  Outcome: Progressing  Goal: Maintains/Returns to pre admission functional level  Description: INTERVENTIONS:- Perform AM-PAC 6 Click Basic Mobility/ Daily Activity assessment daily.- Set and communicate daily mobility goal to care team and patient/family/caregiver. - Collaborate with rehabilitation services on mobility goals if consulted- Reposition patient every 2 hours.- Dangle patient 3 times a day- Stand patient 3 times a day- Ambulate patient 3 times a day- Out of bed to chair 3 times a day - Out of bed for meals 3 times a day- Out of bed for toileting- Record patient progress and toleration of activity level   Outcome: Progressing     Problem: DISCHARGE PLANNING  Goal: Discharge to home or other facility with appropriate resources  Description: INTERVENTIONS:- Identify barriers to discharge w/patient and caregiver- Arrange for needed discharge resources and transportation as appropriate- Identify discharge learning needs (meds, wound care, etc.)- Refer to Case Management Department for coordinating discharge planning if the patient needs post-hospital services based on physician/advanced practitioner order or complex needs related to functional status, cognitive ability, or social support system  Outcome: Progressing      Problem: Knowledge Deficit  Goal: Patient/family/caregiver demonstrates understanding of disease process, treatment plan, medications, and discharge instructions  Description: Complete learning assessment and assess knowledge base.Interventions:- Provide teaching at level of understanding- Provide teaching via preferred learning methods  Outcome: Progressing     Problem: METABOLIC, FLUID AND ELECTROLYTES - ADULT  Goal: Glucose maintained within target range  Description: INTERVENTIONS:- Monitor Blood Glucose as ordered- Assess for signs and symptoms of hyperglycemia and hypoglycemia- Administer ordered medications to maintain glucose within target range- Assess nutritional intake and initiate nutrition service referral as needed  Outcome: Progressing     Problem: Prexisting or High Potential for Compromised Skin Integrity  Goal: Skin integrity is maintained or improved  Description: INTERVENTIONS:- Identify patients at risk for skin breakdown- Assess and monitor skin integrity- Assess and monitor nutrition and hydration status- Monitor labs - Assess for incontinence - Turn and reposition patient- Assist with mobility/ambulation- Relieve pressure over bony prominences- Avoid friction and shearing- Provide appropriate hygiene as needed including keeping skin clean and dry- Evaluate need for skin moisturizer/barrier cream- Collaborate with interdisciplinary team - Patient/family teaching- Consider wound care consult   Outcome: Progressing     Problem: SAFETY,RESTRAINT: NV/NON-SELF DESTRUCTIVE BEHAVIOR  Goal: Remains free of harm/injury (restraint for non violent/non self-detsructive behavior)  Description: INTERVENTIONS:- Instruct patient/family regarding restraint use - Assess and monitor physiologic and psychological status - Provide interventions and comfort measures to meet assessed patient needs - Identify and implement measures to help patient regain control- Assess readiness for release of restraint   Outcome:  Progressing  Goal: Returns to optimal restraint-free functioning  Description: INTERVENTIONS:- Assess the patient's behavior and symptoms that indicate continued need for restraint- Identify and implement measures to help patient regain control- Assess readiness for release of restraint   Outcome: Progressing

## 2025-04-15 LAB
ANION GAP SERPL CALCULATED.3IONS-SCNC: 10 MMOL/L (ref 4–13)
APTT PPP: 30 SECONDS (ref 23–34)
BASOPHILS # BLD AUTO: 0 THOUSANDS/ÂΜL (ref 0–0.1)
BASOPHILS NFR BLD AUTO: 0 % (ref 0–1)
BUN SERPL-MCNC: 17 MG/DL (ref 5–25)
CALCIUM SERPL-MCNC: 9.1 MG/DL (ref 8.4–10.2)
CHLORIDE SERPL-SCNC: 105 MMOL/L (ref 96–108)
CO2 SERPL-SCNC: 23 MMOL/L (ref 21–32)
CREAT SERPL-MCNC: 0.85 MG/DL (ref 0.6–1.3)
EOSINOPHIL # BLD AUTO: 0 THOUSAND/ÂΜL (ref 0–0.61)
EOSINOPHIL NFR BLD AUTO: 0 % (ref 0–6)
ERYTHROCYTE [DISTWIDTH] IN BLOOD BY AUTOMATED COUNT: 11.7 % (ref 11.6–15.1)
GFR SERPL CREATININE-BSD FRML MDRD: 91 ML/MIN/1.73SQ M
GLUCOSE SERPL-MCNC: 157 MG/DL (ref 65–140)
GLUCOSE SERPL-MCNC: 165 MG/DL (ref 65–140)
GLUCOSE SERPL-MCNC: 175 MG/DL (ref 65–140)
GLUCOSE SERPL-MCNC: 176 MG/DL (ref 65–140)
GLUCOSE SERPL-MCNC: 192 MG/DL (ref 65–140)
GLUCOSE SERPL-MCNC: 222 MG/DL (ref 65–140)
HCT VFR BLD AUTO: 37.4 % (ref 36.5–49.3)
HGB BLD-MCNC: 13.7 G/DL (ref 12–17)
IMM GRANULOCYTES # BLD AUTO: 0.07 THOUSAND/UL (ref 0–0.2)
IMM GRANULOCYTES NFR BLD AUTO: 1 % (ref 0–2)
INR PPP: 1.07 (ref 0.85–1.19)
LYMPHOCYTES # BLD AUTO: 0.82 THOUSANDS/ÂΜL (ref 0.6–4.47)
LYMPHOCYTES NFR BLD AUTO: 6 % (ref 14–44)
MAGNESIUM SERPL-MCNC: 2.2 MG/DL (ref 1.9–2.7)
MCH RBC QN AUTO: 31.3 PG (ref 26.8–34.3)
MCHC RBC AUTO-ENTMCNC: 36.6 G/DL (ref 31.4–37.4)
MCV RBC AUTO: 85 FL (ref 82–98)
MONOCYTES # BLD AUTO: 0.57 THOUSAND/ÂΜL (ref 0.17–1.22)
MONOCYTES NFR BLD AUTO: 4 % (ref 4–12)
MRSA NOSE QL CULT: NORMAL
NEUTROPHILS # BLD AUTO: 11.66 THOUSANDS/ÂΜL (ref 1.85–7.62)
NEUTS SEG NFR BLD AUTO: 89 % (ref 43–75)
NRBC BLD AUTO-RTO: 0 /100 WBCS
PHOSPHATE SERPL-MCNC: 3.5 MG/DL (ref 2.3–4.1)
PLATELET # BLD AUTO: 172 THOUSANDS/UL (ref 149–390)
PMV BLD AUTO: 10.3 FL (ref 8.9–12.7)
POTASSIUM SERPL-SCNC: 4.1 MMOL/L (ref 3.5–5.3)
PROTHROMBIN TIME: 14.2 SECONDS (ref 12.3–15)
RBC # BLD AUTO: 4.38 MILLION/UL (ref 3.88–5.62)
SODIUM SERPL-SCNC: 138 MMOL/L (ref 135–147)
WBC # BLD AUTO: 13.12 THOUSAND/UL (ref 4.31–10.16)

## 2025-04-15 PROCEDURE — 85610 PROTHROMBIN TIME: CPT | Performed by: PHYSICIAN ASSISTANT

## 2025-04-15 PROCEDURE — 99233 SBSQ HOSP IP/OBS HIGH 50: CPT | Performed by: INTERNAL MEDICINE

## 2025-04-15 PROCEDURE — 99024 POSTOP FOLLOW-UP VISIT: CPT | Performed by: NEUROLOGICAL SURGERY

## 2025-04-15 PROCEDURE — 80048 BASIC METABOLIC PNL TOTAL CA: CPT | Performed by: PHYSICIAN ASSISTANT

## 2025-04-15 PROCEDURE — 82948 REAGENT STRIP/BLOOD GLUCOSE: CPT

## 2025-04-15 PROCEDURE — 92610 EVALUATE SWALLOWING FUNCTION: CPT

## 2025-04-15 PROCEDURE — 99255 IP/OBS CONSLTJ NEW/EST HI 80: CPT | Performed by: NURSE PRACTITIONER

## 2025-04-15 PROCEDURE — 83735 ASSAY OF MAGNESIUM: CPT | Performed by: PHYSICIAN ASSISTANT

## 2025-04-15 PROCEDURE — 97168 OT RE-EVAL EST PLAN CARE: CPT

## 2025-04-15 PROCEDURE — 85730 THROMBOPLASTIN TIME PARTIAL: CPT | Performed by: PHYSICIAN ASSISTANT

## 2025-04-15 PROCEDURE — NC001 PR NO CHARGE: Performed by: ANESTHESIOLOGY

## 2025-04-15 PROCEDURE — 80074 ACUTE HEPATITIS PANEL: CPT

## 2025-04-15 PROCEDURE — 97164 PT RE-EVAL EST PLAN CARE: CPT

## 2025-04-15 PROCEDURE — 85025 COMPLETE CBC W/AUTO DIFF WBC: CPT | Performed by: PHYSICIAN ASSISTANT

## 2025-04-15 PROCEDURE — 99233 SBSQ HOSP IP/OBS HIGH 50: CPT | Performed by: ANESTHESIOLOGY

## 2025-04-15 PROCEDURE — 84100 ASSAY OF PHOSPHORUS: CPT | Performed by: PHYSICIAN ASSISTANT

## 2025-04-15 RX ORDER — LEVETIRACETAM 500 MG/5ML
500 INJECTION, SOLUTION, CONCENTRATE INTRAVENOUS EVERY 12 HOURS SCHEDULED
Status: DISCONTINUED | OUTPATIENT
Start: 2025-04-15 | End: 2025-04-15

## 2025-04-15 RX ORDER — HEPARIN SODIUM 5000 [USP'U]/ML
5000 INJECTION, SOLUTION INTRAVENOUS; SUBCUTANEOUS EVERY 8 HOURS SCHEDULED
Status: CANCELLED | OUTPATIENT
Start: 2025-04-15

## 2025-04-15 RX ORDER — LEVETIRACETAM 500 MG/1
500 TABLET ORAL EVERY 12 HOURS SCHEDULED
Status: DISCONTINUED | OUTPATIENT
Start: 2025-04-15 | End: 2025-04-22

## 2025-04-15 RX ORDER — HEPARIN SODIUM 5000 [USP'U]/ML
5000 INJECTION, SOLUTION INTRAVENOUS; SUBCUTANEOUS EVERY 12 HOURS SCHEDULED
Status: DISCONTINUED | OUTPATIENT
Start: 2025-04-15 | End: 2025-04-20

## 2025-04-15 RX ADMIN — ACETAMINOPHEN 1000 MG: 10 INJECTION INTRAVENOUS at 17:09

## 2025-04-15 RX ADMIN — CHLORHEXIDINE GLUCONATE 0.12% ORAL RINSE 15 ML: 1.2 LIQUID ORAL at 09:53

## 2025-04-15 RX ADMIN — SODIUM CHLORIDE 75 ML/HR: 0.9 INJECTION, SOLUTION INTRAVENOUS at 21:00

## 2025-04-15 RX ADMIN — ACETAMINOPHEN 1000 MG: 10 INJECTION INTRAVENOUS at 12:23

## 2025-04-15 RX ADMIN — INSULIN LISPRO 2 UNITS: 100 INJECTION, SOLUTION INTRAVENOUS; SUBCUTANEOUS at 17:50

## 2025-04-15 RX ADMIN — SODIUM CHLORIDE 75 ML/HR: 0.9 INJECTION, SOLUTION INTRAVENOUS at 07:31

## 2025-04-15 RX ADMIN — INSULIN LISPRO 1 UNITS: 100 INJECTION, SOLUTION INTRAVENOUS; SUBCUTANEOUS at 05:31

## 2025-04-15 RX ADMIN — HEPARIN SODIUM 5000 UNITS: 5000 INJECTION INTRAVENOUS; SUBCUTANEOUS at 09:54

## 2025-04-15 RX ADMIN — DEXAMETHASONE SODIUM PHOSPHATE 4 MG: 4 INJECTION INTRA-ARTICULAR; INTRALESIONAL; INTRAMUSCULAR; INTRAVENOUS; SOFT TISSUE at 17:09

## 2025-04-15 RX ADMIN — HEPARIN SODIUM 5000 UNITS: 5000 INJECTION INTRAVENOUS; SUBCUTANEOUS at 21:10

## 2025-04-15 RX ADMIN — DEXAMETHASONE SODIUM PHOSPHATE 4 MG: 4 INJECTION INTRA-ARTICULAR; INTRALESIONAL; INTRAMUSCULAR; INTRAVENOUS; SOFT TISSUE at 23:04

## 2025-04-15 RX ADMIN — INSULIN LISPRO 2 UNITS: 100 INJECTION, SOLUTION INTRAVENOUS; SUBCUTANEOUS at 12:33

## 2025-04-15 RX ADMIN — LEVETIRACETAM 500 MG: 500 TABLET, FILM COATED ORAL at 21:09

## 2025-04-15 RX ADMIN — INSULIN LISPRO 1 UNITS: 100 INJECTION, SOLUTION INTRAVENOUS; SUBCUTANEOUS at 23:10

## 2025-04-15 RX ADMIN — SENNOSIDES 17.2 MG: 8.6 TABLET, FILM COATED ORAL at 21:09

## 2025-04-15 RX ADMIN — DEXAMETHASONE SODIUM PHOSPHATE 4 MG: 4 INJECTION INTRA-ARTICULAR; INTRALESIONAL; INTRAMUSCULAR; INTRAVENOUS; SOFT TISSUE at 12:23

## 2025-04-15 RX ADMIN — LEVETIRACETAM 500 MG: 100 INJECTION, SOLUTION INTRAVENOUS at 09:54

## 2025-04-15 RX ADMIN — ACETAMINOPHEN 1000 MG: 10 INJECTION INTRAVENOUS at 23:04

## 2025-04-15 RX ADMIN — ACETAMINOPHEN 1000 MG: 10 INJECTION INTRAVENOUS at 05:31

## 2025-04-15 RX ADMIN — CHLORHEXIDINE GLUCONATE 0.12% ORAL RINSE 15 ML: 1.2 LIQUID ORAL at 21:10

## 2025-04-15 RX ADMIN — BISACODYL 10 MG: 10 SUPPOSITORY RECTAL at 09:54

## 2025-04-15 RX ADMIN — DEXAMETHASONE SODIUM PHOSPHATE 4 MG: 4 INJECTION INTRA-ARTICULAR; INTRALESIONAL; INTRAMUSCULAR; INTRAVENOUS; SOFT TISSUE at 05:31

## 2025-04-15 NOTE — NURSING NOTE
Dupont discontinued and noted bloody drainage at the end of catheter with removal, Sarah mark made aware . [phone]

## 2025-04-15 NOTE — PROGRESS NOTES
Progress Note - Critical Care/ICU   Name: Alexis Dennison 64 y.o. male I MRN: [id number]  Unit/Bed#: ICU 03 I Date of Admission: 3/29/2025   Date of Service: 4/15/2025 I Hospital Day: 17       Assessment & Plan   Active Hospital Problems    Diagnosis Date Noted POA    Brain tumor (HCC) 03/29/2025 Yes    Encephalopathy 04/02/2025 Yes    Constipation 04/01/2025 No    Ambulatory dysfunction 04/01/2025 Yes    Thyroid nodule 03/30/2025 Yes    Pulmonary nodule 03/30/2025 Yes    Liver lesion 03/30/2025 Yes    Type 2 diabetes mellitus with hyperglycemia, without long-term current use of insulin (HCC) 06/14/2022 Yes     Chronic    Mixed hyperlipidemia 06/14/2022 Yes     Chronic    HTN, goal below 130/80 06/14/2022 Yes     Chronic      Resolved Hospital Problems    Diagnosis Date Noted Date Resolved POA    Foot erythema 04/12/2025 04/14/2025 Unknown    Abnormal CT scan 03/29/2025 03/30/2025 Yes     Neuro:   Diagnoses: L anterior basal ganglia mass with surrounding edema, obstructive hydrocephalus, small amount of IVH  4/11 MRI: Interval enlargement of the dominant left anterior basal ganglionic mass lesion with greater surrounding vasogenic edema and mass effect. 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  4/13 CT head: largely unchanged left sided obstructive hydrocephalus, blood products within the occipital horn of the left lateral ventricle is similar from the MRI brain 4/11. Left anterior basal ganglia mass with surrounding edema, regional mass effect, and effacement of the left suprasellar cistern, appears similar to MRI of the brain 4/11/2025  S/P Bedside EVD placement for obstructive hydrocephalus on 4/13  S/P Biopsy of brain mass via Neurosurgery and replaced EVD  4/14 CT H: EVD in place, improved hydrocephalus, mass with vasogenic edema stable    PLAN:   Neuro surgery following, appreciate reccs  EVD monitoring at 15  Neuro  checks every 1 hour  OR Biopsy 4/14  Decadron q6hr, Keppra BID  Repeat CT head with change in GCS > 2 points in 1 hour  Daily CAM-ICU, delirium precautions. Regulate sleep/wake cycle.       CV:  Diagnoses: Hx of HTN, HLD  PLAN:   Home regimen: Hyzaar 100-25mg   Hold losartan/HCTZ  Continue statin  Goal normotension, Continue to monitor cardiopulmonary status maintain MAP>65 mmHg.     Pulm:  Diagnoses: No active issues  PLAN:  Monitor and Maintain for SpO2 > 92%.     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     MSK/Skin:  Diagnoses: No active Issues  PLAN:  Frequent turns/repositioning, offloading  PT/OT         Disposition: Critical care    ICU Core Measures     A: Assess, Prevent, and Manage Pain Has pain been assessed? Yes  Need for changes to pain regimen? No   B: Both SAT/SAT  N/A   C: Choice of Sedation RASS Goal: 0 Alert and Calm  Need for changes to sedation or analgesia regimen? No   D: Delirium CAM-ICU: Negative   E: Early Mobility  Plan for early mobility? Yes   F: Family Engagement Plan for family engagement today? Yes         Prophylaxis:  VTE Contraindicated secondary to: Brain Mass and Biopsy Today   Stress Ulcer  covered byomeprazole (PriLOSEC) 20 mg delayed  release capsule [[id number]] (Long-Term Med), pantoprazole (PROTONIX) injection 40 mg [[id number]]         24 Hour Events : Patient had decreased output of his EVD and slight change in mental status, unable to answer any questions. Patient received stat CTH that showed EVD was in place, hydrocephalus improved, mass stable. Patient returned and continued to demonstrate waxing and waning mentation. Patient also alerted a sepsis alert due to elevated WBC and MAP of 64, WBC secondary to decadron and MAP improved and HD stable. No concern for infection at this time.    Subjective       Objective :                   Vitals I/O      Most Recent Min/Max in 24hrs   Temp 97.8 °F (36.6 °C) Temp  Min: 93.9 °F (34.4 °C)  Max: 98.4 °F (36.9 °C)   Pulse (!) 48 Pulse  Min: 48  Max: 74   Resp 18 Resp  Min: 12  Max: 24   /62 BP  Min: 102/54  Max: 125/69   O2 Sat 97 % SpO2  Min: 95 %  Max: 99 %      Intake/Output Summary (Last 24 hours) at 4/15/2025 0733  Last data filed at 4/15/2025 0600  Gross per 24 hour   Intake 4015.21 ml   Output 2754 ml   Net 1261.21 ml       Diet NPO    Invasive Monitoring           Physical Exam   Physical Exam  Vitals reviewed.   Eyes:      Extraocular Movements: Extraocular movements intact.      Pupils: Pupils are equal, round, and reactive to light.   Skin:     General: Skin is warm and dry.   HENT:      Head:      Comments: EVD  Cardiovascular:      Rate and Rhythm: Normal rate and regular rhythm.      Pulses: Normal pulses.   Musculoskeletal:         General: Normal range of motion.      Right lower leg: No edema.      Left lower leg: No edema.   Abdominal: General: Bowel sounds are normal. There is no distension.      Palpations: Abdomen is soft.      Tenderness: There is no abdominal tenderness.   Constitutional:       General: He is not in acute distress.  Pulmonary:      Effort: Pulmonary effort is normal.      Breath sounds: Normal breath sounds.   Neurological:      Comments: Patient is AAOx2,  unable to recall place, Follows Motor Commands with prompting, waxing and waning          Diagnostic Studies        Lab Results: I have reviewed the following results:     Medications:  Scheduled PRN   acetaminophen, 1,000 mg, Q6H CAIT  [Held by provider] atorvastatin, 20 mg, Daily  bisacodyl, 10 mg, Daily  chlorhexidine, 15 mL, Q12H CAIT  [Held by provider] Cholecalciferol, 2,000 Units, Daily  [Held by provider] vitamin B-12, 1,000 mcg, Daily  dexamethasone, 4 mg, Q6H CAIT  [Held by provider] losartan, 100 mg, Daily   And  [Held by provider] hydroCHLOROthiazide, 25 mg, Daily  insulin lispro, 1-6 Units, Q6H  [Held by provider] pantoprazole, 40 mg, Early Morning  [Held by provider] polyethylene glycol, 17 g, Daily  [Held by provider] senna, 2 tablet, HS      aluminum-magnesium hydroxide-simethicone, 30 mL, Q4H PRN  calcium carbonate, 1,000 mg, Daily PRN  HYDROmorphone, 0.2 mg, Q2H PRN  ondansetron, 4 mg, Q6H PRN  oxyCODONE, 2.5 mg, Q4H PRN   Or  oxyCODONE, 5 mg, Q4H PRN       Continuous    sodium chloride, 75 mL/hr, Last Rate: 75 mL/hr (04/15/25 0731)         Labs:   CBC    Recent Labs     04/14/25  0928 04/15/25  0530   WBC 16.33* 13.12*   HGB 15.6 13.7   HCT 44.0 37.4    172     BMP    Recent Labs     04/14/25  0449 04/15/25  0530   SODIUM 137 138   K 4.2 4.1   CL 97 105   CO2 27 23   AGAP 13 10   BUN 18 17   CREATININE 0.98 0.85   CALCIUM 9.9 9.1       Coags    Recent Labs     04/13/25  1843 04/15/25  0530   INR 0.98 1.07   PTT 29 30        Additional Electrolytes  Recent Labs     04/14/25  0449 04/15/25  0530   MG 2.2 2.2   PHOS 3.7 3.5   CAIONIZED 1.21  --           Blood Gas    No recent results  Recent Labs     04/13/25  1257   PHVEN 7.393   WOK7LLU 43.8   PO2VEN 39.3   OSG8QRT 26.1   BEVEN 0.8   F7JUYEP 72.9    LFTs  No recent results    Infectious  No recent results  Glucose  Recent Labs     04/14/25  0449 04/15/25  0530   GLUC 152* 176*

## 2025-04-15 NOTE — SPEECH THERAPY NOTE
Speech-Language Pathology Bedside Swallow Evaluation      Patient Name: Alexis Dennison    Today's Date: 4/15/2025     Problem List  Principal Problem:    Brain tumor (HCC)  Active Problems:    Type 2 diabetes mellitus with hyperglycemia, without long-term current use of insulin (HCC)    HTN, goal below 130/80    Mixed hyperlipidemia    Thyroid nodule    Pulmonary nodule    Liver lesion    Constipation    Ambulatory dysfunction    Encephalopathy      Past Medical History  Past Medical History:   Diagnosis Date    Colon polyp     Diabetes mellitus (HCC)     GERD (gastroesophageal reflux disease)     Hyperlipidemia     Hypertension     Liver disease     Sleep apnea        Past Surgical History  Past Surgical History:   Procedure Laterality Date    COLONOSCOPY      CYST REMOVAL      back    FL LUMBAR PUNCTURE DIAGNOSTIC  3/31/2025    FL LUMBAR PUNCTURE DIAGNOSTIC  4/7/2025    GA EXC B9 LESION MRGN XCP SK TG T/A/L 0.6-1.0 CM N/A 6/7/2023    Procedure: EXCISION  BIOPSY LESION/MASS ABDOMINAL/CHEST WALL;  Surgeon: Trevor Villavicencio MD;  Location:  MAIN OR;  Service: General       Summary   Pt on  caseload, but is being reassessed as he has been to the OR x2 this week. He presents with adequate oral and pharyngeal stages of swallowing. The patient is evaluated with puree and soft solids, with nectar thick and thin liquids. Mastication is timely and efficient. No overt s/s aspiration observed with trials.     Patient was on ST caseload for language and speech tx. Will re-assess in upcoming session.     Risk/s for Aspiration: low     Recommended Diet: soft/level 3 diet and thin liquids   Recommended Form of Meds: crushed with puree   Aspiration precautions and swallowing strategies: upright posture and small bites/sips  Other Recommendations: Continue frequent oral care    Current Medical Status  24 hour events:  S/p biopsy and EVD replacement  CTH from yesterday evening showed adequate EVD placement with increased L fontal  lobe pneumocephalus post biopsy and improved hydrocephalus   EVD 4 cc out, however ICP low 2-5 and drain level at 15   Decadron added post biopsy    Current Precautions: Fall  Aspiration Delirium  Allergies:  No known food allergies  Past medical history:  Please see H&P for details    Special Studies:  CT head 4/14/25:   IMPRESSION:   Left frontal approach ventriculostomy catheter terminating near the foramen of Monro with increased left frontal lobe pneumocephalus and gas within the left frontal horn since prior study. Slightly increased small amount of layering intraventricular   hemorrhage within the left occipital horn. Improved hydrocephalus primarily of the left lateral ventricle since prior study.   Stable hyperdense mass centered within the left basal ganglia with surrounding vasogenic edema and mild rightward midline shift.    Swallow Information   Current Risks for Dysphagia & Aspiration: recent surgery and recent intubation  Current Symptoms/Concerns:  none  Current Diet: NPO   Baseline Diet: regular diet and thin liquids    Baseline Assessment   Behavior/Cognition: alert  Speech/Language Status: not able to to follow commands and verbal output, but mostly nonsensical  Patient Positioning: upright in bed  Pain Status/Interventions/Response to Interventions: No report of or nonverbal indications of pain.     Swallow Mechanism Exam  Facial: symmetrical  Upper lip appears swollen, but no asymmetry observed. Patient is on RA    Consistencies Assessed and Performance   Consistencies Administered: thin liquids, nectar thick, puree, and soft solids  Materials administered included pudding and linda cracker with nectar thick and thin liquids    Oral Stage: WFL  Mastication was adequate with the materials administered today.  Bolus formation and transfer were functional with no significant oral residue noted.  No overt s/s reduced oral control.    Pharyngeal Stage: WFL  Swallow Mechanics:  Swallowing initiation  appeared prompt.  Laryngeal rise was palpated and judged to be within functional limits.  No coughing, throat clearing, change in vocal quality or respiratory status noted today.     Esophageal Concerns: none reported    Summary and Recommendations (see above)    Results Reviewed with: RN and MD     Treatment Recommended: dysphagia therapy      Frequency of treatment: 2-3x week, as able     Patient Stated Goal: none stated     Dysphagia LTG  -Patient will demonstrate safe and effective oral intake (without overt s/s significant oral/pharyngeal dysphagia including s/s penetration or aspiration) for the highest appropriate diet level.     Short Term Goals:  -Pt will tolerate Dysphagia 3/advanced (dental soft) diet and thin liquid with no significant s/s oral or pharyngeal dysphagia across 1-3 diagnostic session/s.    -Patient will tolerate trials of upgraded food and/or liquid texture with no significant s/s of oral or pharyngeal dysphagia including aspiration across 1-3 diagnostic sessions     Speech Therapy Prognosis   Prognosis: fair    Prognosis Considerations: medical status and cognitive status

## 2025-04-15 NOTE — CASE MANAGEMENT
Case Management Discharge Planning Note    Patient name Alexis Dennison  Location ICU 03/ICU 03 MRN [id number]  : 1960 Date 4/15/2025       Current Admission Date: 3/29/2025  Current Admission Diagnosis:Brain tumor (HCC)   Patient Active Problem List    Diagnosis Date Noted Date Diagnosed    Encephalopathy 2025     Constipation 2025     Ambulatory dysfunction 2025     Thyroid nodule 2025     Pulmonary nodule 2025     Liver lesion 2025     Brain tumor (HCC) 2025     Type 2 diabetes mellitus with hyperglycemia, without long-term current use of insulin (HCC) 2022     HTN, goal below 130/80 2022     Mixed hyperlipidemia 2022     Vitamin D deficiency 2022     NAFLD (nonalcoholic fatty liver disease) 2022       LOS (days): 17  Geometric Mean LOS (GMLOS) (days): 11.1  Days to GMLOS:-5.3     OBJECTIVE:  Risk of Unplanned Readmission Score: 16.99         Current admission status: Inpatient   Preferred Pharmacy:   [id number] - [place] - [address] [address]  [address] [address]  Phone: [phone] Fax: [phone]    [facility] - [place], [place] - [address]  [address]  [facility] [address]  Phone: [phone] Fax: [phone]    Primary Care Provider: PATI Mckeon    Primary Insurance: BLUE CROSS  Secondary Insurance: CAPITAL    DISCHARGE DETAILS:    A post acute care recommendation was made by your care team for STR.  Discussed Freedom of Choice with patient. List of facilities given to patient via in person. patient aware the list is custom filtered for them by zip code location and that [facility] post acute providers are designated. Referrals placed in [facility], [facility] and [place] via [facility]

## 2025-04-15 NOTE — PROGRESS NOTES
Progress Note - Neurosurgery   Name: Alexis Dennison 64 y.o. male I MRN: [id number]  Unit/Bed#: ICU 03 I Date of Admission: 3/29/2025   Date of Service: 4/15/2025 I Hospital Day: 17    Assessment & Plan  Brain tumor (HCC)  POD 1 Left frontal endoscopic biopsy of ventricular mass and replacement of EVD  ([facility], 4/14/25)  Subependymal lesions with large left basal ganglia suspicious for lymphoma.  P/w confusion, short term memory loss.  4/13 - patient had worsening mental status change and CT head demonstrated progressive ventriculomegaly and likely trapped left lateral ventricle, ultimately a left frontal EVD was placed.  4/14 - patient self extubated.  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   EVD replaced during surgery yesterday.     Imaging:  CT head wo 4/14/25:Left frontal approach ventriculostomy catheter terminating near the foramen of Monro with increased left frontal lobe pneumocephalus and gas within the left frontal horn since prior study. Slightly increased small amount of layering intraventricular hemorrhage within the left occipital horn. Improved hydrocephalus primarily of the left lateral ventricle since prior study.Stable hyperdense mass centered within the left basal ganglia with surrounding vasogenic edema and mild rightward midline shift    Plan:  Continue to monitor neuro exam closely.  STAT CT head with any neurological decline including drop GCS of 2pts within 1 hr.  Hold all AC/AP medication.  Medical management and pain control per primary team.  Keppra x1 week post op for seizure prophylaxis  Left EVD in place.  Continue EVD at 15 mmhg.   Monitor output and ICP. Output 4 ml/24H.  ICP -3-12/24H. ICP in room 1.  CSF sent for cytology as well as leukemia/lymphoma flow cytometry, continue to follow results.  Patient had multiple nondiagnostic LPs and hematology/oncology was requesting a biopsy to confirm.  SBP <160.  Decadron 4 mg every 6 hours was started yesterday.   Mobilize as  tolerated.  DVT ppx: SCDs and okay for SQH BID today.     Neurosurgery will continue to follow, call with any further questions or concerns.     Type 2 diabetes mellitus with hyperglycemia, without long-term current use of insulin (HCC)  Lab Results   Component Value Date    HGBA1C 6.6 (H) 02/22/2025       Recent Labs     04/14/25  1803 04/14/25  2132 04/14/25  2356 04/15/25  0530   POCGLU 180* 176* 175* 165*       Blood Sugar Average: Last 72 hrs:  (P) 152.5625    Liver lesion  MRI of abdomen confirming simple hepatic cyst.  Encephalopathy        Subjective Patient states he feels better today and is more awake.  He is able to tell me his birthday and that month is April and year is 2025 unsure of place.  He offers no complaints.    Objective : Patient comfortably sitting up in bed with bilateral wrist restraints in place    Temp:  [93.9 °F (34.4 °C)-98.4 °F (36.9 °C)] 97.8 °F (36.6 °C)  HR:  [48-74] 48  BP: (102-125)/(54-69) 111/62  Resp:  [12-24] 18  SpO2:  [95 %-99 %] 97 %  O2 Device: None (Room air)    I/O         04/13 0701  04/14 0700 04/14 0701  04/15 0700 04/15 0701  04/16 0700    P.O.       I.V. (mL/kg) 1403.1 (16.3) 3665.2 (42.5)     IV Piggyback  350     Total Intake(mL/kg) 1403.1 (16.3) 4015.2 (46.6)     Urine (mL/kg/hr) 1150 (0.6) 2750 (1.3)     Emesis/NG output  0     Drains 24 4     Stool  0     Total Output 1174 2754     Net +229.1 +1261.2            Unmeasured Urine Occurrence 1 x      Unmeasured Stool Occurrence  0 x     Unmeasured Emesis Occurrence  1 x           General appearance: alert, appears stated age, cooperative and no distress  Head: Normocephalic, left frontal EVD in place with good waveform.    Eyes: EOMI, PERRL, conjugate gaze  Neck: supple, symmetrical, trachea midline  Lungs: non labored breathing  Heart: regular heart rate  Neurologic:   Mental status: Alert and oriented to person and time.  Able to state his birthday and the month and year with choices unsure of place.  Cranial  nerves: grossly intact (Cranial nerves II-XII)   Sensory: normal to light touch all extremities x 4  Motor: moving all extremities, able to squeeze hands bilaterally and release as well as wiggle toes to command.  He was able to give me a thumbs up on right and show me 2 fingers today  Reflexes: 2+ and symmetric, no Ilan's or clonus seen    Lab Results: I have reviewed the following results:  Recent Labs     04/14/25  0449 04/14/25  0928 04/15/25  0530   WBC  --    < > 13.12*   HGB  --    < > 13.7   HCT  --    < > 37.4   PLT  --    < > 172   SODIUM 137  --  138   K 4.2  --  4.1   CL 97  --  105   CO2 27  --  23   BUN 18  --  17   CREATININE 0.98  --  0.85   GLUC 152*  --  176*   CAIONIZED 1.21  --   --    MG 2.2  --  2.2   PHOS 3.7  --  3.5   PTT  --   --  30   INR  --   --  1.07    < > = values in this interval not displayed.       Imaging Results Review: I personally reviewed the following image studies in PACS and associated radiology reports: CT head. My interpretation of the radiology images/reports is:  .      VTE Pharmacologic Prophylaxis: Sequential compression device (Venodyne)

## 2025-04-15 NOTE — SEPSIS NOTE
Sepsis Note   Alexis Dennison 64 y.o. male MRN: [id number]  Unit/Bed#: ICU 03 Encounter: [id number]       Initial Sepsis Screening       Row Name 04/15/25 0610                Is the patient's history suggestive of a new or worsening infection? No  -AN                  User Key  (r) = Recorded By, (t) = Taken By, (c) = Cosigned By      Initials Name Provider Type    AN Moncho Thorne DO Resident                        Body mass index is 27.26 kg/m².  Wt Readings from Last 1 Encounters:   03/29/25 86.2 kg (190 lb)        Ideal body weight: 73 kg (160 lb 15 oz)  Adjusted ideal body weight: 78.3 kg (172 lb 9 oz)    Sepsis alert was called for increased WBCs and low MAP. WBCs secondary to steroids, MAP has improved, patient remains HD stable at this time, no concern for infection or sepsis.

## 2025-04-15 NOTE — PROGRESS NOTES
This is an attestation to Resident note     24 hour events:  S/p biopsy and EVD replacement  CTH from yesterday evening showed adequate EVD placement with increased L fontal lobe pneumocephalus post biopsy and improved hydrocephalus   EVD 4 cc out, however ICP low 2-5 and drain level at 15   Decadron added post biopsy     Physical exam:  When I walked into room he was sitting up in bed with EVD open at 15 cm, tracked me briefly  Knows name, but not place or time  EOM grossly intact struggles to maintain command following throughout physical exam  Requires prompting to follow commands, strength grossly equal throughout      Impression:  Hyperdense left basal ganglia with surrounding vasogenic edema with nonspecific 12 mm hypodense right hepatic lobe lesion and nonspecific 4 mm right pulm lower lobe lesion  Obstructive hydrocephalus s/p EVD 4/13  Encephalopathy   Heterogenous nodular enlargement of left thyroid lobe     Plan:  EVD open at 15 cm management per neurosurgery, plan for slow wean  F/U biopsy results  Decadron  Glucose control SSI  Restart DVT ppx BID  Currenty NPO advance diet needs bedside swallow eval vs SLP eval, increase bowel regimen last bowel movement 4/7  Home PPI  Remove connell  Dispo: Remain in ICU while EVD in place

## 2025-04-15 NOTE — PLAN OF CARE
Problem: PHYSICAL THERAPY ADULT  Goal: Performs mobility at highest level of function for planned discharge setting.  See evaluation for individualized goals.  Description: Treatment/Interventions: LE strengthening/ROM, Functional transfer training, Elevations, Therapeutic exercise, Cognitive reorientation, Endurance training, Bed mobility, Equipment eval/education, Patient/family training, Gait training, Spoke to nursing, Spoke to case management, OT, Continued evaluation, Compensatory technique education, Family          See flowsheet documentation for full assessment, interventions and recommendations.  Outcome: Progressing

## 2025-04-15 NOTE — PHYSICAL THERAPY NOTE
Physical Therapy Evaluation     Patient's Name: Alexis Dennison    Admitting Diagnosis  Confusion [R41.0]  Abnormal CT scan [R93.89]  Brain mass [G93.89]    Problem List  Patient Active Problem List   Diagnosis    Type 2 diabetes mellitus with hyperglycemia, without long-term current use of insulin (HCC)    HTN, goal below 130/80    Mixed hyperlipidemia    Vitamin D deficiency    NAFLD (nonalcoholic fatty liver disease)    Brain tumor (HCC)    Thyroid nodule    Pulmonary nodule    Liver lesion    Constipation    Ambulatory dysfunction    Encephalopathy       Past Medical History  Past Medical History:   Diagnosis Date    Colon polyp     Diabetes mellitus (HCC)     GERD (gastroesophageal reflux disease)     Hyperlipidemia     Hypertension     Liver disease     Sleep apnea        Past Surgical History  Past Surgical History:   Procedure Laterality Date    COLONOSCOPY      CYST REMOVAL      back    FL LUMBAR PUNCTURE DIAGNOSTIC  3/31/2025    FL LUMBAR PUNCTURE DIAGNOSTIC  4/7/2025    VT EXC B9 LESION MRGN XCP SK TG T/A/L 0.6-1.0 CM N/A 6/7/2023    Procedure: EXCISION  BIOPSY LESION/MASS ABDOMINAL/CHEST WALL;  Surgeon: Trevor Villavicencio MD;  Location: [facility];  Service: General    THIRD VENTRICULOSTOMY Left 4/14/2025    Procedure: Left frontal endoscopic biopsy of ventricular mass and placement of EVD;  Surgeon: Paul Causey MD;  Location: [facility];  Service: Neurosurgery        04/15/25 1110   PT Last Visit   PT Visit Date 04/15/25   Note Type   Note type (S)  Re-Evaluation  (s/p EVD and brain bx)   Pain Assessment   Pain Assessment Tool FLACC   Pain Rating: FLACC (Rest) - Face 0   Pain Rating: FLACC (Rest) - Legs 0   Pain Rating: FLACC (Rest) - Activity 0   Pain Rating: FLACC (Rest) - Cry 0   Pain Rating: FLACC (Rest) - Consolability 0   Score: FLACC (Rest) 0   Pain Rating: FLACC (Activity) - Face 0   Pain Rating: FLACC (Activity) - Legs 1   Pain Rating: FLACC (Activity) - Activity 1   Pain Rating: FLACC (Activity) -  Cry 0   Pain Rating: FLACC (Activity) - Consolability 0   Score: FLACC (Activity) 2   Restrictions/Precautions   Weight Bearing Precautions Per Order No   Other Precautions Cognitive;Chair Alarm;Bed Alarm;Restraints;Fall Risk;Pain;Telemetry;Multiple lines;Impulsive  (EVD)   Home Living   Additional Comments See PT IE   Prior Function   Comments See PT IE   General   Family/Caregiver Present No   Cognition   Orientation Level Oriented to person   Subjective   Subjective Pt agreeable to PT session   RLE Assessment   RLE Assessment WFL   LLE Assessment   LLE Assessment WFL   Coordination   Movements are Fluid and Coordinated 0   Coordination and Movement Description impulsive, retropulsion, ataxic during backward gait   Bed Mobility   Supine to Sit 4  Minimal assistance   Additional items HOB elevated   Sit to Supine 3  Moderate assistance   Additional items Assist x 1   Transfers   Sit to Stand 3  Moderate assistance   Additional items Assist x 2   Stand to Sit 3  Moderate assistance   Additional items Assist x 2   Ambulation/Elevation   Gait pattern Shuffling;Decreased foot clearance;Forward Flexion;Short stride;Excessively slow;Narrow IRMA;Retropulsion;Ataxia   Gait Assistance 3  Moderate assist   Additional items Assist x 2   Assistive Device Other (Comment)  (HHA)   Distance 2+2+2  (2 lateral 2 fwd/back)   Balance   Static Sitting Fair +   Dynamic Sitting Fair -   Static Standing Poor   Ambulatory Zero   Endurance Deficit   Endurance Deficit Yes   Endurance Deficit Description limited by fatigue, weakness, balance, cognitive   Activity Tolerance   Activity Tolerance Other (Comment);Patient limited by fatigue  (cognitive)   Medical Staff Made Aware OT   Nurse Made Aware yes, nsg gave clearance to work with pt   Assessment   Prognosis Fair   Problem List Decreased strength;Decreased range of motion;Decreased endurance;Impaired balance;Decreased mobility;Decreased coordination;Decreased cognition;Decreased safety  awareness;Impaired judgement;Pain;Orthopedic restrictions;Decreased skin integrity   Assessment Pt is 64 y.o. male seen for PT re-evaluation s/p admit to [facility] on 3/29/2025 w/ Brain tumor (HCC) hospital course complicated by increased confusion and aphasia. Pt for re-eval post EVD and brain bx. PT consulted to assess pt's functional mobility and d/c needs. Order placed for PT eval and tx, w/ up w/ A order. Comorbidities affecting pt's physical performance at time of assessment include:  has a past medical history of Colon polyp, Diabetes mellitus (HCC), GERD (gastroesophageal reflux disease), Hyperlipidemia, Hypertension, Liver disease, and Sleep apnea. PTA, pt was ambulates community distances and elevations. Personal factors affecting pt at time of IE include: inability to ambulate household distances, decreased cognition, impulsivity, unable to perform physical activity, limited insight into impairments, inability to perform IADLs, and inability to perform ADLs. Please find objective findings from PT assessment regarding body systems outlined above with impairments and limitations including weakness, impaired balance, decreased endurance, impaired coordination, gait deviations, pain, decreased activity tolerance, decreased functional mobility tolerance, decreased safety awareness, impaired judgement, fall risk, and decreased cognition. The following objective measures performed on IE also reveal limitations: The patient's AM-PAC Basic Mobility Inpatient Short Form Raw Score is 8. A Raw score of less than or equal to 16 suggests the patient may benefit from discharge to post-acute rehabilitation services. Please also refer to the recommendation of the Physical Therapist for safe discharge planning. Pt's clinical presentation is currently unstable/unpredictable seen in pt's presentation of critical care monitoring, EVD. Pt to benefit from continued PT tx to address deficits as defined above and  maximize level of functional independent mobility and consistency. From PT/mobility standpoint, recommendation at time of d/c would be level I pending progress in order to facilitate return to PLOF.   Barriers to Discharge Inaccessible home environment;Decreased caregiver support   Goals   Patient Goals None stated   STG Expiration Date 04/27/25   Short Term Goal #1 1. Complete bed mobility and transfers I to decrease need for caregiver in home. 2. Ambulate 300' I to complete household and community mobility without A. 3. Improve dynamic balance to good to decrease need for UE support during ambulation. 4. Be educated & demonstate 12 steps to be able to enter home without A.   Plan   Treatment/Interventions OT;Spoke to case management;Spoke to nursing;Gait training;Bed mobility;Patient/family training;Endurance training;LE strengthening/ROM;Functional transfer training   PT Frequency 3-5x/wk   Discharge Recommendation   Rehab Resource Intensity Level, PT I (Maximum Resource Intensity)   AM-PAC Basic Mobility Inpatient   Turning in Flat Bed Without Bedrails 2   Lying on Back to Sitting on Edge of Flat Bed Without Bedrails 2   Moving Bed to Chair 1   Standing Up From Chair Using Arms 1   Walk in Room 1   Climb 3-5 Stairs With Railing 1   Basic Mobility Inpatient Raw Score 8   Turning Head Towards Sound 3   Follow Simple Instructions 2   Low Function Basic Mobility Raw Score  13   Low Function Basic Mobility Standardized Score  20.14   [facility] Highest Level Of Mobility   -HLM Goal 3: Sit at edge of bed   -HLM Achieved 4: Move to chair/commode           Shikha Whitehead, PT

## 2025-04-15 NOTE — ASSESSMENT & PLAN NOTE
Presented with progressively worsening confusion after recent diagnosis  MRI 4/11/2025 showed interval enlargement of the dominant left anterior basal ganglionic mass lesion with greater surrounding vasogenic edema and mass effect. 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 Differential considerations include lymphoma, multicentric high-grade glioma, and less likely metastasis. Correlation with CSF cytology is recommended. Small amount of intraventricular hemorrhage. Interval progression of the previously noted neoplastic disease with the dominant mass in the left anterior basal ganglia and numerous growing ependymal and intraventricular nodular lesions.  S/P biopsy and EVD placement with neurosurgery   Preliminary result suggestive of non-Hodgkins lymphoma.   Will likely require methotrexate based chemotherapy.   The patient needs repeat therapy evaluations as he was last seen on 4/4 and 4/8. He may be a candidate for acute inpatient rehabilitation once medically stable however treatment with chemotherapy may be a barrier to facility acceptance.

## 2025-04-15 NOTE — ASSESSMENT & PLAN NOTE
Admitted 3/29 with worsening confusion since prior ED presentation and outpatient imaging  Seen at [facility] ED on 3/24 with 3 week history of altered mental status, memory difficulty  CTH 3/24 demonstrated multiple hyperdense ependyma/subependymal nodules  MRI brain 3/26 performed outpatient demonstrating multiple scattered foci of subependymal nodular enhancement with mild hydrocephalus, scattered nodular areas of enhancement in left anterior inferior basal ganglia dn foci of nodular enhancement along anteromedial aspect of left cerebral peduncle and left quirino  CTH 3/29 (this admission)  Stable subependymal nodules and left formamen of [place] region hyperdense lesion with dilation of left lateral ventrical and minimal left to right midline shift  S/p LP 3/31  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   MRI Brain (4/11) showed progression of disease from 3/30 to 4/11 with interval enlargement of the dominant left anterior basal ganglionic mass with greater surrounding vasogenic edema and mass effect and persistent leptomeningeal and intraventricular seeding with obstructive hydrocephalus--current differential lymphoma versus high-grade glioma less likely metastasis  4/13: continued deterioration in mental status, CT Head 4/13 demonstrated left sided obstructive hydrocephalus from lesion located adjacent to formamen of [place], similar to CT on 4/3; blood products within occiptial horn of left lateral ventricle similar to MRI 4/11; left anterior basal ganglia mass with surrounding edema,  "regional mass effect and effacement of left suprasellar cistern, similar to MRI 4/11.  Transferred to ICU, intubation for airway protection and EVD placed per NSGY for progressive ventriculomegaly and likely trapped left lateral ventricle  Pt self-extubated on 4/14, was not reintubated prior to transport to OR  4/14: Tissue biopsy obtained in OR by NSGY, EVD remains in place; initiated steroids with dexamethasone 4mg q6h  Preliminary biopsy results from 4/14: \"Small round blue cell tumor; differential Dx includes lymphoma    Plan:  Repeat CSF studies 4/13 pending  Pending final pathology result for treatment planning; contacted assigned pathologist, sample is still being processed  Hep B panel ordered in anticipation of possible regimen for lymphoma treatment    "

## 2025-04-15 NOTE — QUICK NOTE
"Called to bedside that pt's EVD is not draining.  -CT head obtained and reviewed; improved lateral ventricle size.improving hydrocephalus. Formal read pending  -evaluated pt bedside, pt awake, says \"hello\" \"I'm doing better I guess\". Right upper lip erythema and edema. Denies headache. Moving all ext spontaneously, required repeated promoting before he followed commands  -EVD waveform present and good, ICP 2. EVD set at 15, this is probably why it is not draining because pt's ICP is 2 and to drain is set at 15. Since waveform is good, pt doing well, and CT improved, will continue to monitor at this time.    Critical care time 25 minutes   "

## 2025-04-15 NOTE — CONSULTS
PHYSICAL MEDICINE AND REHABILITATION CONSULT NOTE  Alexis Dennison 64 y.o. male MRN: [id number]  Unit/Bed#: ICU 03 Encounter: [id number]    Requested by (Physician/Service): Raciel Smith MD  Reason for Consultation:  Assessment of rehabilitation needs    Assessment & Plan  Brain tumor (HCC)  Presented with progressively worsening confusion after recent diagnosis  MRI 4/11/2025 showed interval enlargement of the dominant left anterior basal ganglionic mass lesion with greater surrounding vasogenic edema and mass effect. 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 Differential considerations include lymphoma, multicentric high-grade glioma, and less likely metastasis. Correlation with CSF cytology is recommended. Small amount of intraventricular hemorrhage. Interval progression of the previously noted neoplastic disease with the dominant mass in the left anterior basal ganglia and numerous growing ependymal and intraventricular nodular lesions.  S/P biopsy and EVD placement with neurosurgery   Preliminary result suggestive of non-Hodgkins lymphoma.   Will likely require methotrexate based chemotherapy.   The patient needs repeat therapy evaluations as he was last seen on 4/4 and 4/8. He may be a candidate for acute inpatient rehabilitation once medically stable however treatment with chemotherapy may be a barrier to facility acceptance.   Type 2 diabetes mellitus with hyperglycemia, without long-term current use of insulin (HCC)  Lab Results   Component Value Date    HGBA1C 6.6 (H) 02/22/2025       Recent Labs     04/14/25  2132 04/14/25  2356 04/15/25  0530 04/15/25  1210   POCGLU 176* 175* 165* 222*       Blood Sugar Average: Last 72 hrs:  (P) 156.7382802563503060    HTN, goal below 130/80    Mixed hyperlipidemia    Thyroid nodule    Pulmonary nodule  CTA on 3/26 showed non-specific 4 mm RLL pulmonary nodule  3 month follow up  recommended   Liver lesion  CTA showed non-specific 12 mm hypodense right hepatic lesion   MRI abdomen showed no suspicious hepatic lesions, demonstrated simple right hepatic lobe cyst  Constipation    Ambulatory dysfunction    Encephalopathy  Likely due to brain mass    Thank you for this consultation.  Do not hesitate to contact service with further questions.      PATI Ford  PM&R    I have spent a total time of 30 minutes on 04/15/25 in caring for this patient including Patient and family education, Counseling / Coordination of care, Documenting in the medical record, Reviewing/placing orders in the medical record (including tests, medications, and/or procedures), Obtaining or reviewing history  , and Communicating with other healthcare professionals .        History of Present Illness:  Alexis Dennison is a 64 y.o. male with a PMH of diabetes, GERD, HLD, HTN, liver disease and sleep apnea who presented to the [facility] on 3/29/2025 with increased confusion in the setting of recently discovered brain mass. He was noted by family to have increasing confusion over the past several month with forgetfulness. Initial work up was negative however was seen at [facility] on 3/24 where CT head showed a brain lesion and MRI was recommended. He had progression in confusion prompting presentation. 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 CTA C/A/P showed a right lower lobe pulmonary nodule. Non-specific hypodense lesion within the hepatic lobe possibly representing hemangioma however metastatic lesion could not be excluded. MRI of the abdomen showed no suspicious  hepatic lesions, demonstrated simple right hepatic lobe cyst. 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 Repeat CT head on 4/3 showed increased ventricular caliber concerning for worsening hydrocephalus. MRI on 4/11 showed progression of disease from 3/30 to 4/11 with interval enlargement of the dominant left anterior basal ganglionic mass with greater surrounding vasogenic edema and mass effect and persistent leptomeningeal and intraventricular seeding with obstructive hydrocephalus--current differential lymphoma versus high grade glioma less likely metastasis. On 4/13 he had continued deterioration in mental status and repeat CT showed left sided obstructive hydrocephalus from lesion located adjacent to formamen of [place], similar to CT on 4/3; blood products within occiptial horn of left lateral ventricle similar to MRI 4/11; left anterior basal ganglia mass with surrounding edema, regional mass effect and effacement of left suprasellar cistern, similar to MRI 4/11. He required intubation and was admitted to ICU. EVD was placed by neurosurgery and biopsy was done. Repeat CSF studies were done on 4/13 and are pending. He self extubated on 4/14. CT head on 4/14 showed adequate EVD placement with increased left frontal lobe pneumocephalus post biopsy and improved hydrocephalus. 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 Per oncology if confirmed he will be started on high dose methotrexate based chemotherapy. PM&R are consulted for rehabilitation recommendations.     The patient was seen in his room. He does try to answer questions and speak however mostly non-sensical/word salad. He is able to follow simple commands and is moving all extremities. He is currently requiring bilateral wrist restraints.     Review of Systems: 10 point ROS negative except for what is noted in HPI    Function:  Prior level of function and living situation: The patient lives in a  "two level home with 1/2 bath on the main level. He was independent and lives with his spouse and children.       Current level of function:  Physical Therapy: Pending re-evaluation (last seen 4/4)  Occupational Therapy: pending re-evaluation (last seen 4/8)  Speech Therapy: Dysphagia level 3 with thins       Physical Exam:  /58 (BP Location: Right arm)   Pulse (!) 46   Temp 97.6 °F (36.4 °C) (Oral)   Resp 16   Ht 5' 10\" (1.778 m)   Wt 86.2 kg (190 lb)   SpO2 98%   BMI 27.26 kg/m²        Intake/Output Summary (Last 24 hours) at 4/15/2025 1250  Last data filed at 4/15/2025 1223  Gross per 24 hour   Intake 4265.21 ml   Output 2194 ml   Net 2071.21 ml       Body mass index is 27.26 kg/m².      Physical Exam  Constitutional:       General: He is not in acute distress.     Appearance: He is not toxic-appearing.      Interventions: He is restrained.   HENT:      Head:      Comments: EVD in place   Pulmonary:      Effort: Pulmonary effort is normal. No respiratory distress.   Abdominal:      General: There is no distension.   Musculoskeletal:         General: Normal range of motion.   Skin:     General: Skin is warm and dry.   Neurological:      Mental Status: He is alert.      Comments: Mostly non-sensical speech but able to state his head hurts. Able to follow simple command and show two fingers on his left hand when asked.    Psychiatric:         Cognition and Memory: Cognition is impaired. Memory is impaired.         Judgment: Judgment is impulsive.        Social History:    Social History     Socioeconomic History    Marital status: /Civil Union     Spouse name: None    Number of children: None    Years of education: None    Highest education level: None   Occupational History    None   Tobacco Use    Smoking status: Never    Smokeless tobacco: Never   Vaping Use    Vaping status: Never Used   Substance and Sexual Activity    Alcohol use: Yes     Alcohol/week: 1.0 standard drink of alcohol     " Types: 1 Cans of beer per week     Comment: socially    Drug use: Never    Sexual activity: Not Currently     Partners: Female   Other Topics Concern    None   Social History Narrative    None     Social Drivers of Health     Financial Resource Strain: Not on file   Food Insecurity: Patient Unable To Answer (4/3/2025)    Nursing - Inadequate Food Risk Classification     Worried About Running Out of Food in the Last Year: Not on file     Ran Out of Food in the Last Year: Not on file     Ran Out of Food in the Last Year: Patient unable to answer   Transportation Needs: Patient Unable To Answer (4/3/2025)    Nursing - Transportation Risk Classification     Lack of Transportation: Not on file     Lack of Transportation: Patient unable to answer   Physical Activity: Not on file   Stress: Not on file   Social Connections: Not on file   Intimate Partner Violence: Patient Unable To Answer (4/3/2025)    Nursing IPS     Feels Physically and Emotionally Safe: Not on file     Physically Hurt by Someone: Not on file     Humiliated or Emotionally Abused by Someone: Not on file     Physically Hurt by Someone: Patient unable to answer     Hurt or Threatened by Someone: Patient unable to answer   Housing Stability: Patient Unable To Answer (4/3/2025)    Nursing: Inadequate Housing Risk Classification     Has Housing: Not on file     Worried About Losing Housing: Not on file     Unable to Get Utilities: Not on file     Unable to Pay for Housing in the Last Year: Patient unable to answer     Has Housin        Family History:    Family History   Problem Relation Age of Onset    Cancer Mother     Cancer Father     Diabetes Paternal Grandfather     Diabetes unspecified Paternal Grandfather         Type 2    Colon polyps Neg Hx     Colon cancer Neg Hx          Medications:     Current Facility-Administered Medications:     acetaminophen (Ofirmev) injection 1,000 mg, 1,000 mg, Intravenous, Q6H Camelia CHAVIRA CRNP, Stopped at  04/15/25 1247    aluminum-magnesium hydroxide-simethicone (MAALOX) oral suspension 30 mL, 30 mL, Oral, Q4H PRN, Gayathri Guo PA-C    atorvastatin (LIPITOR) tablet 20 mg, 20 mg, Oral, Daily, Moncho Thorne DO, 20 mg at 04/14/25 0810    bisacodyl (DULCOLAX) rectal suppository 10 mg, 10 mg, Rectal, Daily, Gayathri Guo PA-C, 10 mg at 04/15/25 0954    calcium carbonate (TUMS) chewable tablet 1,000 mg, 1,000 mg, Oral, Daily PRN, ANITRA RockwellC    chlorhexidine (PERIDEX) 0.12 % oral rinse 15 mL, 15 mL, Mouth/Throat, Q12H CAIT, MARLO Rockwell-C, 15 mL at 04/15/25 0953    [Held by provider] Cholecalciferol (VITAMIN D3) tablet 2,000 Units, 2,000 Units, Oral, Daily, MARLO Rockwell-C, 2,000 Units at 04/14/25 0810    [Held by provider] cyanocobalamin (VITAMIN B-12) tablet 1,000 mcg, 1,000 mcg, Oral, Daily, MARLO Rockwell-C, 1,000 mcg at 04/13/25 0935    dexamethasone (DECADRON) injection 4 mg, 4 mg, Intravenous, Q6H CAIT, Gayathri Guo PA-C, 4 mg at 04/15/25 1223    heparin (porcine) subcutaneous injection 5,000 Units, 5,000 Units, Subcutaneous, Q12H CAIT, Raciel Smith MD, 5,000 Units at 04/15/25 0954    [Held by provider] losartan (COZAAR) tablet 100 mg, 100 mg, Oral, Daily, 100 mg at 04/13/25 0936 **AND** [Held by provider] hydroCHLOROthiazide tablet 25 mg, 25 mg, Oral, Daily, Brianna gEan PA-C, 25 mg at 04/13/25 0936    HYDROmorphone HCl (DILAUDID) injection 0.2 mg, 0.2 mg, Intravenous, Q2H PRN, Gayathri Biundo, PA-C    insulin lispro (HumALOG/ADMELOG) 100 units/mL subcutaneous injection 1-6 Units, 1-6 Units, Subcutaneous, Q6H, 2 Units at 04/15/25 1233 **AND** Fingerstick Glucose (POCT), , , Q6H, Gayathri Guo PA-C    levETIRAcetam (KEPPRA) tablet 500 mg, 500 mg, Oral, Q12H CAIT, Moncho Thorne DO    ondansetron (ZOFRAN) injection 4 mg, 4 mg, Intravenous, Q6H PRN, Gayathri Guo PA-C, 4 mg at 04/14/25 0930    oxyCODONE (ROXICODONE) split tablet 2.5 mg, 2.5 mg,  Oral, Q4H PRN **OR** oxyCODONE (ROXICODONE) IR tablet 5 mg, 5 mg, Oral, Q4H PRN, Gayathri Guo PA-C    pantoprazole (PROTONIX) EC tablet 40 mg, 40 mg, Oral, Early Morning, Moncho Thorne DO    polyethylene glycol (MIRALAX) packet 17 g, 17 g, Oral, Daily, Moncho Thorne DO, 17 g at 04/14/25 0810    senna (SENOKOT) tablet 17.2 mg, 2 tablet, Oral, HS, Moncho Thorne DO, 17.2 mg at 04/11/25 2114    sodium chloride 0.9 % infusion, 75 mL/hr, Intravenous, Continuous, PATI Garcia, Last Rate: 75 mL/hr at 04/15/25 0731, 75 mL/hr at 04/15/25 0731    Past Medical History:     Past Medical History:   Diagnosis Date    Colon polyp     Diabetes mellitus (HCC)     GERD (gastroesophageal reflux disease)     Hyperlipidemia     Hypertension     Liver disease     Sleep apnea         Past Surgical History:     Past Surgical History:   Procedure Laterality Date    COLONOSCOPY      CYST REMOVAL      back    FL LUMBAR PUNCTURE DIAGNOSTIC  3/31/2025    FL LUMBAR PUNCTURE DIAGNOSTIC  4/7/2025    ME EXC B9 LESION MRGN XCP SK TG T/A/L 0.6-1.0 CM N/A 6/7/2023    Procedure: EXCISION  BIOPSY LESION/MASS ABDOMINAL/CHEST WALL;  Surgeon: Trevor Villavicencio MD;  Location:  MAIN OR;  Service: General         Allergies:     No Known Allergies        LABORATORY RESULTS:      Lab Results   Component Value Date    HGB 13.7 04/15/2025    HCT 37.4 04/15/2025    WBC 13.12 (H) 04/15/2025     Lab Results   Component Value Date    BUN 17 04/15/2025    BUN 18 09/22/2023    K 4.1 04/15/2025    K 4.2 09/22/2023     04/15/2025    CL 97 (L) 09/22/2023    CREATININE 0.85 04/15/2025     Lab Results   Component Value Date    PROTIME 14.2 04/15/2025    INR 1.07 04/15/2025        DIAGNOSTIC STUDIES: Reviewed  CT head wo contrast  Result Date: 4/3/2025  Impression: Redemonstrated hyperattenuating subependymal masses better characterized on recent MR. Interval increased ventricular caliber, left greater than right in comparison to recent CT dated  3/29/2025 concerning for worsening hydrocephalus. Recommend neurosurgical consultation. The study was marked in EPIC for immediate notification. Workstation performed: [id number]     FL IN-patient lumbar puncture  Result Date: 3/31/2025  Impression: Successful fluoroscopically guided lumbar puncture. Opening pressure: 15 cm of H20 Workstation performed: [id number]     MRI abdomen w wo contrast  Result Date: 3/31/2025  Impression: 1.  No suspicious hepatic lesions. There is a simple right hepatic lobe cyst. 2.  Mild diffuse hepatic steatosis. Workstation performed: [id number]     CT head wo contrast  Result Date: 3/29/2025  Impression: Stable subependymal nodules and left foramen of [place]ira region hyperdense lesion again seen with associated mild symmetrical dilatation of the left lateral ventricle and minimal left to right midline shift. The overall findings are unchanged compared to the prior study. No acute intracranial hemorrhage or evidence of acute cerebral infarction. Workstation performed: [id number]     CTA chest abdomen pelvis w wo contrast  Result Date: 3/29/2025  Impression: 1.  There is a nonspecific 12 mm hypodense lesion within the right hepatic lobe. While this may represent a hemangioma, a metastatic lesion can not be excluded. This is suboptimally evaluated on the current exam, due to arterial timing of the contrast bolus and background diffuse hepatic steatosis. Further evaluation by gadolinium enhanced MRI of the abdomen is recommended. 2.  Nonspecific 4 mm right lower lobe pulmonary nodule. In the setting of a known malignancy, a 3-month follow-up chest CT could be performed for further evaluation. 3.  Heterogeneous nodular enlargement of the left thyroid lobe. A nonemergent thyroid ultrasound follow-up is recommended. 4.  Please refer to the report body for description of other incidental, chronic and/or benign findings. The study was marked in EPIC for significant notification. Workstation  performed: [id number]

## 2025-04-15 NOTE — ASSESSMENT & PLAN NOTE
CTA showed non-specific 12 mm hypodense right hepatic lesion   MRI abdomen showed no suspicious hepatic lesions, demonstrated simple right hepatic lobe cyst

## 2025-04-15 NOTE — ASSESSMENT & PLAN NOTE
Lab Results   Component Value Date    HGBA1C 6.6 (H) 02/22/2025       Recent Labs     04/14/25  1803 04/14/25  2132 04/14/25  2356 04/15/25  0530   POCGLU 180* 176* 175* 165*       Blood Sugar Average: Last 72 hrs:  (P) 152.5625

## 2025-04-15 NOTE — ASSESSMENT & PLAN NOTE
Lab Results   Component Value Date    HGBA1C 6.6 (H) 02/22/2025       Recent Labs     04/14/25  2132 04/14/25  2356 04/15/25  0530 04/15/25  1210   POCGLU 176* 175* 165* 222*       Blood Sugar Average: Last 72 hrs:  (P) [id number]

## 2025-04-15 NOTE — PLAN OF CARE
Problem: OCCUPATIONAL THERAPY ADULT  Goal: Performs self-care activities at highest level of function for planned discharge setting.  See evaluation for individualized goals.  Description: Treatment Interventions: ADL retraining, Visual perceptual retraining, Functional transfer training, UE strengthening/ROM, Endurance training, Cognitive reorientation, Patient/family training, Equipment evaluation/education, Compensatory technique education, Continued evaluation, Energy conservation, Activityengagement, Fine motor coordination activities  Equipment Recommended:  (tbd)       See flowsheet documentation for full assessment, interventions and recommendations.   Note: Pt is a  63 y/o male seen for OT re-reval s/p L frontal endoscopic biopsy of ventricular mass and replacement of EVD on 4/14.  Pt initially admitted for brain mass of basal ganglia. Pt currently performing @ a level of Mod A UB ADLS, Max A LB ADLS, Min A bed mobility, Mod A x2 transfers and functional mobility w/ B/L HHA. Pt remains limited 2* impulsivity, safety awareness, activity tolerance, aphasia, coordination, strength, endurance, cognition, functional mobility, forward functional reach, balance and decreased I w/ ADLS . Continue to recommend maximum resource intensity, upon D/C. Pt continues to benefit from immediate inpatient skilled OT services, 3-5x/wk. Will continue to follow to address goals stated below to be met within the next 10-14 days:    Thuy Jolley MS, OTR/L     Thuy Jolley MS, OTR/L

## 2025-04-15 NOTE — OCCUPATIONAL THERAPY NOTE
Occupational Therapy Re-Evaluation     Patient Name: Alexis Dennison  Today's Date: 4/15/2025  Problem List  Principal Problem:    Brain tumor (HCC)  Active Problems:    Type 2 diabetes mellitus with hyperglycemia, without long-term current use of insulin (HCC)    HTN, goal below 130/80    Mixed hyperlipidemia    Thyroid nodule    Pulmonary nodule    Liver lesion    Constipation    Ambulatory dysfunction    Encephalopathy    Past Medical History  Past Medical History:   Diagnosis Date    Colon polyp     Diabetes mellitus (HCC)     GERD (gastroesophageal reflux disease)     Hyperlipidemia     Hypertension     Liver disease     Sleep apnea      Past Surgical History  Past Surgical History:   Procedure Laterality Date    COLONOSCOPY      CYST REMOVAL      back    FL LUMBAR PUNCTURE DIAGNOSTIC  3/31/2025    FL LUMBAR PUNCTURE DIAGNOSTIC  4/7/2025    OR EXC B9 LESION MRGN XCP SK TG T/A/L 0.6-1.0 CM N/A 6/7/2023    Procedure: EXCISION  BIOPSY LESION/MASS ABDOMINAL/CHEST WALL;  Surgeon: Trevor Villavicencio MD;  Location: [facility];  Service: General    THIRD VENTRICULOSTOMY Left 4/14/2025    Procedure: Left frontal endoscopic biopsy of ventricular mass and placement of EVD;  Surgeon: Paul Causey MD;  Location: [facility];  Service: Neurosurgery         04/15/25 1109   OT Last Visit   OT Visit Date 04/15/25   Note Type   Note type (S)  Re-Evaluation   Pain Assessment   Pain Assessment Tool FLACC   Pain Rating: FLACC (Rest) - Face 0   Pain Rating: FLACC (Rest) - Legs 0   Pain Rating: FLACC (Rest) - Activity 0   Pain Rating: FLACC (Rest) - Cry 0   Pain Rating: FLACC (Rest) - Consolability 0   Score: FLACC (Rest) 0   Pain Rating: FLACC (Activity) - Face 0   Pain Rating: FLACC (Activity) - Legs 0   Pain Rating: FLACC (Activity) - Activity 0   Pain Rating: FLACC (Activity) - Cry 0   Pain Rating: FLACC (Activity) - Consolability 0   Score: FLACC (Activity) 0   Restrictions/Precautions   Weight Bearing Precautions Per Order No    Other Precautions Cognitive;Chair Alarm;Bed Alarm;Impulsive;Restraints;Multiple lines;Telemetry;Fall Risk;Pain  (EVD; wrist restraints bilaterally; expressive aphasia)   Home Living   Additional Comments see initial OT eval   Prior Function   Comments see initial OT eval   Lifestyle   Autonomy I with ADL's/iaDL's, no AD with functional mobility +drives   Reciprocal Relationships spouse, children   Service to Others full time employement   Intrinsic Gratification reading   General   Family/Caregiver Present No   ADL   Eating Assistance 3  Moderate Assistance   Grooming Assistance 3  Moderate Assistance   UB Bathing Assistance 3  Moderate Assistance   LB Bathing Assistance 2  Maximal Assistance   UB Dressing Assistance 3  Moderate Assistance   LB Dressing Assistance 2  Maximal Assistance   Toileting Assistance  2  Maximal Assistance   Functional Assistance 3  Moderate Assistance   Functional Deficit Steadying;Verbal cueing;Impulsive;Supervision/safety;Increased time to complete   Bed Mobility   Supine to Sit 4  Minimal assistance   Additional items Assist x 1;HOB elevated;Increased time required;Impulsive;Verbal cues;LE management   Sit to Supine 3  Moderate assistance   Additional items Assist x 1;Increased time required;Impulsive;Verbal cues;LE management   Additional Comments pt sat EOB w/ Min A for sitting balance/trunk control; impulsive w/ use of UEs, reaching for lines/EVD frequently.   Transfers   Sit to Stand 3  Moderate assistance   Additional items Assist x 2;Increased time required;Verbal cues;Impulsive   Stand to Sit 3  Moderate assistance   Additional items Assist x 2;Increased time required;Impulsive;Verbal cues   Additional Comments B/L HHA Used. VC for safety, impulsivity noted throughout   Functional Mobility   Functional Mobility 3  Moderate assistance   Additional Comments pt took few small steps along side of bed w/ Mod A x2; B/L HHA used. VC for safety. Limited by impulsivity.   Additional  items Hand hold assistance   Balance   Static Sitting Fair -   Dynamic Sitting Poor +   Static Standing Poor   Dynamic Standing Poor   Ambulatory Poor   Activity Tolerance   Activity Tolerance Patient limited by fatigue;Other (Comment)  (cognition/aphasia)   Medical Staff Made Aware PT   Nurse Made Aware yes, Eliane   MOUNA Assessment   RUE Assessment WFL   LUE Assessment   LUE Assessment WFL   Hand Function   Gross Motor Coordination Functional   Fine Motor Coordination Functional   Cognition   Overall Cognitive Status Impaired   Arousal/Participation Responsive;Cooperative   Attention Attends with cues to redirect   Orientation Level Oriented to person;Disoriented to place;Disoriented to time;Disoriented to situation  (oriented to name only; not  birthday; limited by aphasia)   Memory Decreased recall of precautions;Decreased recall of recent events;Decreased short term memory   Following Commands Follows one step commands with increased time or repetition   Comments Pt is pleasant and cooperative; presents w/ expressive aphasia and impaired cognition. Has decreased safety awareness, displays impulsivity and poor insight.   Assessment   Limitation Decreased ADL status;Decreased Safe judgement during ADL;Decreased cognition;Decreased endurance;Decreased self-care trans;Decreased high-level ADLs   Prognosis Fair   Assessment Pt is a  63 y/o male seen for OT re-reval s/p L frontal endoscopic biopsy of ventricular mass and replacement of EVD on 4/14.  Pt initially admitted for brain mass of basal ganglia. Pt currently performing @ a level of Mod A UB ADLS, Max A LB ADLS, Min A bed mobility, Mod A x2 transfers and functional mobility w/ B/L HHA. Pt remains limited 2* impulsivity, safety awareness, activity tolerance, aphasia, coordination, strength, endurance, cognition, functional mobility, forward functional reach, balance and decreased I w/ ADLS . Continue to recommend maximum resource intensity, upon D/C. Pt continues  to benefit from immediate inpatient skilled OT services, 3-5x/wk. Will continue to follow to address goals stated below to be met within the next 10-14 days:   Goals   Patient Goals none stated 2/2 confusion   LTG Time Frame 10-14   Long Term Goal #1 see below listed goals   Plan   Treatment Interventions ADL retraining;Visual perceptual retraining;Functional transfer training;UE strengthening/ROM;Cognitive reorientation;Endurance training;Patient/family training;Equipment evaluation/education;Fine motor coordination activities;Compensatory technique education;Continued evaluation;Energy conservation;Activityengagement   Goal Expiration Date 04/29/25   OT Frequency 3-5x/wk   Discharge Recommendation   Recommendation Physiatry Consult   Rehab Resource Intensity Level, OT I (Maximum Resource Intensity)   Additional Comments  The patient's raw score on the AM-PAC Daily Activity Inpatient Short Form is 12. A raw score of less than 19 suggests the patient may benefit from discharge to post-acute rehabilitation services. Please refer to the recommendation of the Occupational Therapist for safe discharge planning.   Additional Comments 2 Pt seen as a co-session due to the patient's co-morbidities, clinically unstable presentation, and present impairments which are a regression from the patient's baseline.   AM-PAC Daily Activity Inpatient   Lower Body Dressing 2   Bathing 2   Toileting 2   Upper Body Dressing 2   Grooming 2   Eating 2   Daily Activity Raw Score 12   Daily Activity Standardized Score (Calc for Raw Score >=11) 30.6   AM-PAC Applied Cognition Inpatient   Following a Speech/Presentation 2   Understanding Ordinary Conversation 2   Taking Medications 2   Remembering Where Things Are Placed or Put Away 2   Remembering List of 4-5 Errands 2   Taking Care of Complicated Tasks 1   Applied Cognition Raw Score 11   Applied Cognition Standardized Score 27.03   End of Consult   Education Provided Yes   Patient Position  at End of Consult Supine;Bed/Chair alarm activated;All needs within reach   Nurse Communication Nurse aware of consult        GOALS    1) Pt will improve activity tolerance to G for 30 min txment sessions for increased engagement in functional tasks    2) Pt will complete UB/LB dressing/self care w/ mod I using adaptive device and DME as needed    3) Pt will complete bathing w/ Mod I w/ use of AE and DME as needed    4) Pt will complete toileting w/ mod I w/ G hygiene/thoroughness using DME as needed    5) Pt will improve functional transfers to Mod I on/off all surfaces using DME as needed w/ G balance/safety     6) Pt will improve functional mobility during ADL/IADL/leisure tasks to Mod I using DME as needed w/ G balance/safety     7) Pt will improve bed mobility to Mod I and sit EOB w/ G balance/trunk control as a prerequisite for further engagement in meaningful tasks    8) Pt will be attentive 100% of the time during ongoing cognitive assessment w/ G participation to assist w/ safe d/c planning/recommendations    9) Pt will demonstrate G carryover of pt/caregiver education and training as appropriate w/o cues w/ good tolerance to increase safety during functional tasks    10) Pt will demonstrate 100% carryover of energy conservation techniques t/o functional I/ADL/leisure tasks w/o cues s/p skilled education to increase endurance during functional tasks     11) Pt will participate in simulated IADL management task to increase independence to Mod I w/ G safety and endurance    12) Pt will engage in ongoing  assessments, screens, and activities t/o fx'l I/ADL/leisure tasks w/ G participation to A w/ adaptation and accomodations or rule out visual perceptual impairments    Thuy Jolley MS, OTR/L

## 2025-04-15 NOTE — PROGRESS NOTES
Progress Note - Oncology-Medical   Name: Alexis Dennison 64 y.o. male I MRN: [id number]  Unit/Bed#: ICU 03 I Date of Admission: 3/29/2025   Date of Service: 4/15/2025 I Hospital Day: 17    Assessment & Plan  Brain tumor (HCC)  Admitted 3/29 with worsening confusion since prior ED presentation and outpatient imaging  Seen at [facility] ED on 3/24 with 3 week history of altered mental status, memory difficulty  CTH 3/24 demonstrated multiple hyperdense ependyma/subependymal nodules  MRI brain 3/26 performed outpatient demonstrating multiple scattered foci of subependymal nodular enhancement with mild hydrocephalus, scattered nodular areas of enhancement in left anterior inferior basal ganglia dn foci of nodular enhancement along anteromedial aspect of left cerebral peduncle and left quirino  CTH 3/29 (this admission)  Stable subependymal nodules and left formamen of De Oliveira region hyperdense lesion with dilation of left lateral ventrical and minimal left to right midline shift  S/p LP 3/31  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   MRI Brain (4/11) showed progression of disease from 3/30 to 4/11 with interval enlargement of the dominant left anterior basal ganglionic mass with greater surrounding vasogenic edema and mass effect and persistent leptomeningeal and intraventricular seeding with obstructive hydrocephalus--current differential lymphoma versus high-grade glioma less likely metastasis  4/13: continued deterioration in mental status, CT Head 4/13 demonstrated left sided obstructive  "hydrocephalus from lesion located adjacent to formamen of Monro, similar to CT on 4/3; blood products within occiptial horn of left lateral ventricle similar to MRI 4/11; left anterior basal ganglia mass with surrounding edema, regional mass effect and effacement of left suprasellar cistern, similar to MRI 4/11.  Transferred to ICU, intubation for airway protection and EVD placed per NSGY for progressive ventriculomegaly and likely trapped left lateral ventricle  Pt self-extubated on 4/14, was not reintubated prior to transport to OR  4/14: Tissue biopsy obtained in OR by NSGY, EVD remains in place; initiated steroids with dexamethasone 4mg q6h  Preliminary biopsy results from 4/14: \"Small round blue cell tumor; differential Dx includes lymphoma    Plan:  Repeat CSF studies 4/13 pending  Pending final pathology result for treatment planning; contacted assigned pathologist, sample is still being processed  Hep B panel ordered in anticipation of possible regimen for lymphoma treatment    Encephalopathy  Most likely d/t brain mass, see interim A/P above  Pulmonary nodule  CTA 3/29 with nonspecific 4 mm RLL pulm nodule, recommendation for 3 month follow up  Liver lesion  CTA 3/29 with nonspecific 12mm hypodense right hepatic lesion, recommended MRI abdomen  MRI 3/30 with no suspicious hepatic lesions, demonstrated simple right hepatic lobe cyst    Subjective   NAEON, seen resting in bed, NAD. Appears somewhat more alert and attentive to conversation today, although many verbal responses are still nonsensical/word salad. Able to state name for me, but not date of birth, when asked where he is, he provided a string of numbers, but able to tell me he is in the hospital when given a list of responses.      Objective :  Temp:  [93.9 °F (34.4 °C)-98.2 °F (36.8 °C)] 97.6 °F (36.4 °C)  HR:  [42-74] 46  BP: (100-137)/(51-69) 100/58  Resp:  [14-24] 16  SpO2:  [95 %-99 %] 98 %  O2 Device: None (Room air)      Physical Exam  Vitals " reviewed.   Cardiovascular:      Rate and Rhythm: Regular rhythm. Bradycardia present.   Pulmonary:      Effort: Pulmonary effort is normal. No respiratory distress.   Abdominal:      General: There is no distension.   Neurological:      Comments: Able to state name, able to state location when given list of options, appears more attentive today           Lab Results: I have reviewed the following results:CBC/BMP:   .     04/15/25  0530   WBC 13.12*   HGB 13.7   HCT 37.4      SODIUM 138   K 4.1      CO2 23   BUN 17   CREATININE 0.85   GLUC 176*   MG 2.2   PHOS 3.5    , LFTs: No new results in last 24 hours.   Lab Results   Component Value Date    K 4.1 04/15/2025     04/15/2025    CO2 23 04/15/2025    BUN 17 04/15/2025    CREATININE 0.85 04/15/2025    GLUF 131 (H) 02/22/2025    CALCIUM 9.1 04/15/2025    AST 11 (L) 04/04/2025    ALT 22 04/04/2025    ALKPHOS 41 04/04/2025    EGFR 91 04/15/2025     Lab Results   Component Value Date    WBC 13.12 (H) 04/15/2025    HGB 13.7 04/15/2025    HCT 37.4 04/15/2025    MCV 85 04/15/2025     04/15/2025     Lab Results   Component Value Date    NEUTROABS 11.66 (H) 04/15/2025       Imaging Results Review: No pertinent imaging studies reviewed.  Other Study Results Review: No additional pertinent studies reviewed.    Administrative Statements   I have spent a total time of 25 minutes in caring for this patient on the day of the visit/encounter including Counseling / Coordination of care, Documenting in the medical record, Obtaining or reviewing history  , and Communicating with other healthcare professionals .

## 2025-04-15 NOTE — ASSESSMENT & PLAN NOTE
POD 1 Left frontal endoscopic biopsy of ventricular mass and replacement of EVD  (Causey, 4/14/25)  Subependymal lesions with large left basal ganglia suspicious for lymphoma.  P/w confusion, short term memory loss.  4/13 - patient had worsening mental status change and CT head demonstrated progressive ventriculomegaly and likely trapped left lateral ventricle, ultimately a left frontal EVD was placed.  4/14 - patient self extubated.  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   EVD replaced during surgery yesterday.     Imaging:  CT head wo 4/14/25:Left frontal approach ventriculostomy catheter terminating near the foramen of Monro with increased left frontal lobe pneumocephalus and gas within the left frontal horn since prior study. Slightly increased small amount of layering intraventricular hemorrhage within the left occipital horn. Improved hydrocephalus primarily of the left lateral ventricle since prior study.Stable hyperdense mass centered within the left basal ganglia with surrounding vasogenic edema and mild rightward midline shift    Plan:  Continue to monitor neuro exam closely.  STAT CT head with any neurological decline including drop GCS of 2pts within 1 hr.  Hold all AC/AP medication.  Medical management and pain control per primary team.  Keppra x1 week post op for seizure prophylaxis  Left EVD in place.  Continue EVD at 15 mmhg.   Monitor output and ICP. Output 4 ml/24H.  ICP -3-12/24H. ICP in room 1.  CSF sent for cytology as well as leukemia/lymphoma flow cytometry, continue to follow results.  Patient had multiple nondiagnostic LPs and hematology/oncology was requesting a biopsy to confirm.  SBP <160.  Decadron 4 mg every 6 hours was started yesterday.   Mobilize as tolerated.  DVT ppx: SCDs and okay for SQH BID today.     Neurosurgery will continue to follow, call with any further questions or concerns.

## 2025-04-16 ENCOUNTER — DOCUMENTATION (OUTPATIENT)
Dept: HEMATOLOGY ONCOLOGY | Facility: CLINIC | Age: 65
End: 2025-04-16

## 2025-04-16 PROBLEM — C85.90 LYMPHOMA (HCC): Status: ACTIVE | Noted: 2025-04-16

## 2025-04-16 PROBLEM — C83.390 PRIMARY CNS LYMPHOMA: Status: ACTIVE | Noted: 2025-04-16

## 2025-04-16 PROBLEM — Z51.5 PALLIATIVE CARE ENCOUNTER: Status: ACTIVE | Noted: 2025-04-16

## 2025-04-16 PROBLEM — Z71.89 GOALS OF CARE, COUNSELING/DISCUSSION: Status: ACTIVE | Noted: 2025-04-16

## 2025-04-16 LAB
ALBUMIN SERPL BCG-MCNC: 3.7 G/DL (ref 3.5–5)
ALP SERPL-CCNC: 42 U/L (ref 34–104)
ALT SERPL W P-5'-P-CCNC: 37 U/L (ref 7–52)
ANION GAP SERPL CALCULATED.3IONS-SCNC: 7 MMOL/L (ref 4–13)
AST SERPL W P-5'-P-CCNC: 15 U/L (ref 13–39)
BASOPHILS # BLD AUTO: 0 THOUSANDS/ÂΜL (ref 0–0.1)
BASOPHILS NFR BLD AUTO: 0 % (ref 0–1)
BILIRUB DIRECT SERPL-MCNC: 0.13 MG/DL (ref 0–0.2)
BILIRUB SERPL-MCNC: 0.64 MG/DL (ref 0.2–1)
BUN SERPL-MCNC: 17 MG/DL (ref 5–25)
CALCIUM SERPL-MCNC: 8.8 MG/DL (ref 8.4–10.2)
CHLORIDE SERPL-SCNC: 107 MMOL/L (ref 96–108)
CO2 SERPL-SCNC: 23 MMOL/L (ref 21–32)
CREAT SERPL-MCNC: 0.71 MG/DL (ref 0.6–1.3)
EOSINOPHIL # BLD AUTO: 0 THOUSAND/ÂΜL (ref 0–0.61)
EOSINOPHIL NFR BLD AUTO: 0 % (ref 0–6)
ERYTHROCYTE [DISTWIDTH] IN BLOOD BY AUTOMATED COUNT: 11.8 % (ref 11.6–15.1)
GFR SERPL CREATININE-BSD FRML MDRD: 98 ML/MIN/1.73SQ M
GLUCOSE SERPL-MCNC: 159 MG/DL (ref 65–140)
GLUCOSE SERPL-MCNC: 165 MG/DL (ref 65–140)
GLUCOSE SERPL-MCNC: 169 MG/DL (ref 65–140)
GLUCOSE SERPL-MCNC: 208 MG/DL (ref 65–140)
GLUCOSE SERPL-MCNC: 236 MG/DL (ref 65–140)
HAV IGM SER QL: NORMAL
HBV CORE IGM SER QL: NORMAL
HBV SURFACE AG SER QL: NORMAL
HBV SURFACE AG SER QL: NORMAL
HCT VFR BLD AUTO: 36.7 % (ref 36.5–49.3)
HCV AB SER QL: NORMAL
HGB BLD-MCNC: 13.3 G/DL (ref 12–17)
IMM GRANULOCYTES # BLD AUTO: 0.09 THOUSAND/UL (ref 0–0.2)
IMM GRANULOCYTES NFR BLD AUTO: 1 % (ref 0–2)
LDH SERPL-CCNC: 139 U/L (ref 140–271)
LYMPHOCYTES # BLD AUTO: 0.96 THOUSANDS/ÂΜL (ref 0.6–4.47)
LYMPHOCYTES NFR BLD AUTO: 8 % (ref 14–44)
MAGNESIUM SERPL-MCNC: 2.1 MG/DL (ref 1.9–2.7)
MCH RBC QN AUTO: 31.7 PG (ref 26.8–34.3)
MCHC RBC AUTO-ENTMCNC: 36.2 G/DL (ref 31.4–37.4)
MCV RBC AUTO: 87 FL (ref 82–98)
MONOCYTES # BLD AUTO: 0.51 THOUSAND/ÂΜL (ref 0.17–1.22)
MONOCYTES NFR BLD AUTO: 4 % (ref 4–12)
NEUTROPHILS # BLD AUTO: 9.97 THOUSANDS/ÂΜL (ref 1.85–7.62)
NEUTS SEG NFR BLD AUTO: 87 % (ref 43–75)
NRBC BLD AUTO-RTO: 0 /100 WBCS
PHOSPHATE SERPL-MCNC: 3 MG/DL (ref 2.3–4.1)
PLATELET # BLD AUTO: 145 THOUSANDS/UL (ref 149–390)
PMV BLD AUTO: 11.2 FL (ref 8.9–12.7)
POTASSIUM SERPL-SCNC: 4 MMOL/L (ref 3.5–5.3)
PROT SERPL-MCNC: 6 G/DL (ref 6.4–8.4)
RBC # BLD AUTO: 4.2 MILLION/UL (ref 3.88–5.62)
SCAN RESULT: NORMAL
SODIUM SERPL-SCNC: 137 MMOL/L (ref 135–147)
WBC # BLD AUTO: 11.53 THOUSAND/UL (ref 4.31–10.16)

## 2025-04-16 PROCEDURE — NC001 PR NO CHARGE: Performed by: ANESTHESIOLOGY

## 2025-04-16 PROCEDURE — 80048 BASIC METABOLIC PNL TOTAL CA: CPT

## 2025-04-16 PROCEDURE — 99233 SBSQ HOSP IP/OBS HIGH 50: CPT | Performed by: INTERNAL MEDICINE

## 2025-04-16 PROCEDURE — 83615 LACTATE (LD) (LDH) ENZYME: CPT

## 2025-04-16 PROCEDURE — 83735 ASSAY OF MAGNESIUM: CPT

## 2025-04-16 PROCEDURE — 82948 REAGENT STRIP/BLOOD GLUCOSE: CPT

## 2025-04-16 PROCEDURE — 84100 ASSAY OF PHOSPHORUS: CPT

## 2025-04-16 PROCEDURE — 85025 COMPLETE CBC W/AUTO DIFF WBC: CPT

## 2025-04-16 PROCEDURE — 80076 HEPATIC FUNCTION PANEL: CPT

## 2025-04-16 PROCEDURE — 02HV33Z INSERTION OF INFUSION DEVICE INTO SUPERIOR VENA CAVA, PERCUTANEOUS APPROACH: ICD-10-PCS

## 2025-04-16 PROCEDURE — 99233 SBSQ HOSP IP/OBS HIGH 50: CPT | Performed by: ANESTHESIOLOGY

## 2025-04-16 PROCEDURE — C1751 CATH, INF, PER/CENT/MIDLINE: HCPCS

## 2025-04-16 PROCEDURE — 36569 INSJ PICC 5 YR+ W/O IMAGING: CPT

## 2025-04-16 PROCEDURE — 87340 HEPATITIS B SURFACE AG IA: CPT

## 2025-04-16 PROCEDURE — 99024 POSTOP FOLLOW-UP VISIT: CPT | Performed by: NEUROLOGICAL SURGERY

## 2025-04-16 RX ORDER — ACETAMINOPHEN 325 MG/1
650 TABLET ORAL EVERY 6 HOURS PRN
Status: DISCONTINUED | OUTPATIENT
Start: 2025-04-16 | End: 2025-05-07 | Stop reason: HOSPADM

## 2025-04-16 RX ORDER — LORAZEPAM 2 MG/ML
0.5 INJECTION INTRAMUSCULAR DAILY PRN
Status: DISCONTINUED | OUTPATIENT
Start: 2025-04-16 | End: 2025-04-19

## 2025-04-16 RX ORDER — DEXMEDETOMIDINE HYDROCHLORIDE 4 UG/ML
.1-.7 INJECTION, SOLUTION INTRAVENOUS
Status: DISCONTINUED | OUTPATIENT
Start: 2025-04-16 | End: 2025-04-17

## 2025-04-16 RX ADMIN — INSULIN LISPRO 1 UNITS: 100 INJECTION, SOLUTION INTRAVENOUS; SUBCUTANEOUS at 05:10

## 2025-04-16 RX ADMIN — ATORVASTATIN CALCIUM 20 MG: 20 TABLET, FILM COATED ORAL at 09:42

## 2025-04-16 RX ADMIN — HEPARIN SODIUM 5000 UNITS: 5000 INJECTION INTRAVENOUS; SUBCUTANEOUS at 20:58

## 2025-04-16 RX ADMIN — DEXAMETHASONE SODIUM PHOSPHATE 4 MG: 4 INJECTION INTRA-ARTICULAR; INTRALESIONAL; INTRAMUSCULAR; INTRAVENOUS; SOFT TISSUE at 05:02

## 2025-04-16 RX ADMIN — LEVETIRACETAM 500 MG: 500 TABLET, FILM COATED ORAL at 20:58

## 2025-04-16 RX ADMIN — INSULIN LISPRO 1 UNITS: 100 INJECTION, SOLUTION INTRAVENOUS; SUBCUTANEOUS at 12:29

## 2025-04-16 RX ADMIN — INSULIN LISPRO 3 UNITS: 100 INJECTION, SOLUTION INTRAVENOUS; SUBCUTANEOUS at 18:38

## 2025-04-16 RX ADMIN — DEXAMETHASONE SODIUM PHOSPHATE 4 MG: 4 INJECTION INTRA-ARTICULAR; INTRALESIONAL; INTRAMUSCULAR; INTRAVENOUS; SOFT TISSUE at 12:29

## 2025-04-16 RX ADMIN — PANTOPRAZOLE SODIUM 40 MG: 40 TABLET, DELAYED RELEASE ORAL at 05:02

## 2025-04-16 RX ADMIN — DEXMEDETOMIDINE HYDROCHLORIDE 0.1 MCG/KG/HR: 400 INJECTION INTRAVENOUS at 14:28

## 2025-04-16 RX ADMIN — DEXAMETHASONE SODIUM PHOSPHATE 4 MG: 4 INJECTION INTRA-ARTICULAR; INTRALESIONAL; INTRAMUSCULAR; INTRAVENOUS; SOFT TISSUE at 18:46

## 2025-04-16 RX ADMIN — INSULIN LISPRO 2 UNITS: 100 INJECTION, SOLUTION INTRAVENOUS; SUBCUTANEOUS at 23:55

## 2025-04-16 RX ADMIN — DEXAMETHASONE SODIUM PHOSPHATE 4 MG: 4 INJECTION INTRA-ARTICULAR; INTRALESIONAL; INTRAMUSCULAR; INTRAVENOUS; SOFT TISSUE at 23:55

## 2025-04-16 RX ADMIN — CHLORHEXIDINE GLUCONATE 0.12% ORAL RINSE 15 ML: 1.2 LIQUID ORAL at 20:58

## 2025-04-16 RX ADMIN — LEVETIRACETAM 500 MG: 500 TABLET, FILM COATED ORAL at 09:42

## 2025-04-16 RX ADMIN — CHLORHEXIDINE GLUCONATE 0.12% ORAL RINSE 15 ML: 1.2 LIQUID ORAL at 09:42

## 2025-04-16 RX ADMIN — HEPARIN SODIUM 5000 UNITS: 5000 INJECTION INTRAVENOUS; SUBCUTANEOUS at 09:42

## 2025-04-16 NOTE — PROGRESS NOTES
Progress Note - Oncology-Medical   Name: Alexis Dennison 64 y.o. male I MRN: [id number]  Unit/Bed#: ICU 03 I Date of Admission: 3/29/2025   Date of Service: 4/16/2025 I Hospital Day: 18    Assessment & Plan  Brain tumor (HCC)  Admitted 3/29 with worsening confusion since prior ED presentation and outpatient imaging  Seen at [facility] ED on 3/24 with 3 week history of altered mental status, memory difficulty  CTH 3/24 demonstrated multiple hyperdense ependyma/subependymal nodules  MRI brain 3/26 performed outpatient demonstrating multiple scattered foci of subependymal nodular enhancement with mild hydrocephalus, scattered nodular areas of enhancement in left anterior inferior basal ganglia dn foci of nodular enhancement along anteromedial aspect of left cerebral peduncle and left quirino  CTH 3/29 (this admission)  Stable subependymal nodules and left formamen of De Oliveira region hyperdense lesion with dilation of left lateral ventrical and minimal left to right midline shift  S/p LP 3/31  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   MRI Brain (4/11) showed progression of disease from 3/30 to 4/11 with interval enlargement of the dominant left anterior basal ganglionic mass with greater surrounding vasogenic edema and mass effect and persistent leptomeningeal and intraventricular seeding with obstructive hydrocephalus--current differential lymphoma versus high-grade glioma less likely metastasis  4/13: continued deterioration in mental status, CT Head 4/13 demonstrated left sided obstructive  hydrocephalus from lesion located adjacent to formamen of Monro, similar to CT on 4/3; blood products within occiptial horn of left lateral ventricle similar to MRI 4/11; left anterior basal ganglia mass with surrounding edema, regional mass effect and effacement of left suprasellar cistern, similar to MRI 4/11.  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)    Plan:  MRI C/T/L/S for evaluation of spinal involvement  PICC placement today  Initiation of chemotherapy with high-dose methotrexate and rituximab while inpatient - tentatively on 4/17  Palliative care referral placed  Radiation oncology referral placed  Ophthalmology ambulatory referral placed for discharge (no slit-lamp exam available inpatient)  Encephalopathy  Most likely d/t brain mass, see interim A/P above  Pulmonary nodule  CTA 3/29 with nonspecific 4 mm RLL pulm nodule, recommendation for 3 month follow up  Liver lesion  CTA 3/29 with nonspecific 12mm hypodense right hepatic lesion, recommended MRI abdomen  MRI 3/30 with no suspicious hepatic lesions, demonstrated simple right hepatic lobe cyst    Subjective   NAEON, seen today in NAD. Mental status waxing and waning, continues to have expressive aphasia.    Pathology results show large B-ce3ll lymphoma with ancillary testing pending. A telephone conversation was held with spouse Naldo, daughter Elizabeth, and sister/niece at bedside to update them on the diagnosis of CNS lymphoma, plan for chemotherapy, and placement of a PICC line.    Objective :  Temp:  [97.6 °F (36.4 °C)-98.4 °F (36.9 °C)] 97.6 °F (36.4 °C)  HR:  [40-54] 40  BP:  (100-140)/(51-71) 139/65  Resp:  [16-24] 18  SpO2:  [96 %-99 %] 96 %  O2 Device: None (Room air)      Physical Exam  Vitals reviewed.   Constitutional:       General: He is awake. He is not in acute distress.     Appearance: He is not ill-appearing.   HENT:      Head: Normocephalic.      Comments: EVD in place     Right Ear: External ear normal.      Left Ear: External ear normal.      Mouth/Throat:      Mouth: Mucous membranes are moist.   Eyes:      General: No scleral icterus.     Conjunctiva/sclera: Conjunctivae normal.   Cardiovascular:      Rate and Rhythm: Regular rhythm. Bradycardia present.   Pulmonary:      Effort: Pulmonary effort is normal. No accessory muscle usage or respiratory distress.   Abdominal:      General: There is no distension.   Musculoskeletal:      Right lower leg: No edema.      Left lower leg: No edema.   Skin:     General: Skin is warm and dry.      Coloration: Skin is not jaundiced.   Neurological:      General: No focal deficit present.      Mental Status: He is alert and oriented to person, place, and time.   Psychiatric:         Attention and Perception: Attention normal.           Lab Results: I have reviewed the following results:CBC/BMP:   .     04/16/25  0511   WBC 11.53*   HGB 13.3   HCT 36.7   *   SODIUM 137   K 4.0      CO2 23   BUN 17   CREATININE 0.71   GLUC 169*   MG 2.1   PHOS 3.0    , LFTs:   .     04/16/25  0511   AST 15   ALT 37   ALB 3.7   TBILI 0.64   ALKPHOS 42      Lab Results   Component Value Date    K 4.0 04/16/2025     04/16/2025    CO2 23 04/16/2025    BUN 17 04/16/2025    CREATININE 0.71 04/16/2025    GLUF 131 (H) 02/22/2025    CALCIUM 8.8 04/16/2025    AST 11 (L) 04/04/2025    ALT 22 04/04/2025    ALKPHOS 41 04/04/2025    EGFR 98 04/16/2025     Lab Results   Component Value Date    WBC 11.53 (H) 04/16/2025    HGB 13.3 04/16/2025    HCT 36.7 04/16/2025    MCV 87 04/16/2025     (L) 04/16/2025     Lab Results   Component Value Date     NEUTROABS 9.97 (H) 04/16/2025       Imaging Results Review: No pertinent imaging studies reviewed.  Other Study Results Review: No additional pertinent studies reviewed.    Administrative Statements   I have spent a total time of 30 minutes in caring for this patient on the day of the visit/encounter including Patient and family education, Counseling / Coordination of care, Documenting in the medical record, Reviewing/placing orders in the medical record (including tests, medications, and/or procedures), Obtaining or reviewing history  , and Communicating with other healthcare professionals .

## 2025-04-16 NOTE — ASSESSMENT & PLAN NOTE
In the setting of the above  On exam, he is alert and interactive. Oriented to name only, does not recall , disoriented to all else.

## 2025-04-16 NOTE — PROGRESS NOTES
Progress Note - Neurosurgery   Name: Alexis Dennison 64 y.o. male I MRN: [id number]  Unit/Bed#: ICU 03 I Date of Admission: 3/29/2025   Date of Service: 4/16/2025 I Hospital Day: 18    Assessment & Plan  Brain tumor (HCC)  POD 2 Left frontal endoscopic biopsy of ventricular mass and replacement of EVD  ([facility], 4/14/25)  Subependymal lesions with large left basal ganglia suspicious for lymphoma.  P/w confusion, short term memory loss.  Patient had multiple nondiagnostic LPs and hematology/oncology was requesting a biopsy to confirm.  4/13 - patient had worsening mental status change and CT head demonstrated progressive ventriculomegaly and likely trapped left lateral ventricle, ultimately a left frontal EVD was placed.  4/14 - patient self extubated.  Preliminary pathology, small round blue cell tumor; differential Dx includes lymphoma   EVD replaced during surgery on 4/14    Imaging:  CT head wo 4/14/25:Left frontal approach ventriculostomy catheter terminating near the foramen of Monro with increased left frontal lobe pneumocephalus and gas within the left frontal horn since prior study. Slightly increased small amount of layering intraventricular hemorrhage within the left occipital horn. Improved hydrocephalus primarily of the left lateral ventricle since prior study.Stable hyperdense mass centered within the left basal ganglia with surrounding vasogenic edema and mild rightward midline shift    Plan:  Continue to monitor neuro exam closely.  STAT CT head with any neurological decline including drop GCS of 2pts within 1 hr.  Hold all AC/AP medication.  Medical management and pain control per primary team.  Keppra x1 week post op for seizure prophylaxis  Left EVD in place.  Continue EVD raised to 20 mmhg today.   Monitor output and ICP. Output 0 ml/24H.  ICP 0-7/24H. ICP in room 4.  CSF sent for cytology as well as leukemia/lymphoma flow cytometry, continue to follow results.  SBP <160.  On Decadron 4 mg every 6  hours    Mobilize as tolerated.  DVT ppx: SCDs and SQH  Patient's case and imaging were discussed at [facility] this morning, Oncology starting high dose MTX today.    Hematology oncology following, input appreciated  Plan to repeat MRI BT protocol Sunday.     Neurosurgery will continue to follow, call with any further questions or concerns.     Type 2 diabetes mellitus with hyperglycemia, without long-term current use of insulin (HCC)  Lab Results   Component Value Date    HGBA1C 6.6 (H) 02/22/2025       Recent Labs     04/15/25  1210 04/15/25  1740 04/15/25  2309 04/16/25  0510   POCGLU 222* 192* 157* 165*       Blood Sugar Average: Last 72 hrs:  (P) 167.75    Liver lesion  MRI of abdomen confirming simple hepatic cyst.  Encephalopathy          Subjective Patient states he feels better today.  He is able to tell me his name, current month and year with choices unsure of place even with choices.  He does complain of a little headache as well as some blurry vision.  He is more interactive today and briskly following commands.  He is able to identify 2 objects.       Objective : Patient comfortably sitting up in bed, NAD with bilateral wrist restraints in place.    Temp:  [97.6 °F (36.4 °C)-98.4 °F (36.9 °C)] 97.6 °F (36.4 °C)  HR:  [40-54] 40  BP: (100-140)/(51-71) 139/65  Resp:  [16-24] 18  SpO2:  [96 %-99 %] 96 %  O2 Device: None (Room air)    I/O         04/14 0701  04/15 0700 04/15 0701  04/16 0700 04/16 0701  04/17 0700    P.O.  630     I.V. (mL/kg) 3665.2 (42.5) 1880 (21.8)     IV Piggyback 350 300     Total Intake(mL/kg) 4015.2 (46.6) 2810 (32.6)     Urine (mL/kg/hr) 2750 (1.3) 1392 (0.7)     Emesis/NG output 0      Drains 4 0     Stool 0 0     Total Output 2754 1392     Net +1261.2 +1418            Unmeasured Urine Occurrence  1 x     Unmeasured Stool Occurrence 0 x 2 x     Unmeasured Emesis Occurrence 1 x            General appearance: alert, appears stated age, cooperative and no distress  Head: Normocephalic,  left frontal EVD in place with good waveform.    Eyes: EOMI, PERRL, conjugate gaze  Neck: supple, symmetrical, trachea midline  Lungs: non labored breathing  Heart: Bradycardic  Neurologic:   Mental status: Alert and oriented to person and time.  Able to identify month and year correctly unsure of place even with choices.  He was able to identify 2 objects today.  Cranial nerves: grossly intact (Cranial nerves II-XII)   Sensory: normal to light touch all extremities x 4  Motor: moving all extremities, able to squeeze hands bilaterally and release as well as lift bilateral legs off of bed to command.    Reflexes: 2+ and symmetric, no Ilan's or clonus seen    Lab Results: I have reviewed the following results:  Recent Labs     04/14/25  0449 04/14/25  0928 04/15/25  0530 04/16/25  0511   WBC  --    < > 13.12* 11.53*   HGB  --    < > 13.7 13.3   HCT  --    < > 37.4 36.7   PLT  --    < > 172 145*   SODIUM 137  --  138 137   K 4.2  --  4.1 4.0   CL 97  --  105 107   CO2 27  --  23 23   BUN 18  --  17 17   CREATININE 0.98  --  0.85 0.71   GLUC 152*  --  176* 169*   CAIONIZED 1.21  --   --   --    MG 2.2  --  2.2 2.1   PHOS 3.7  --  3.5 3.0   PTT  --   --  30  --    INR  --   --  1.07  --     < > = values in this interval not displayed.       VTE Pharmacologic Prophylaxis: Sequential compression device (Venodyne)  and Heparin

## 2025-04-16 NOTE — ASSESSMENT & PLAN NOTE
CTA 3/29 demonstrates non specific 12 mm hypodense lesion within right hepatic lobe, MRI recommended  MRI 3/30 demonstrates no suspicious hepatic lesions, simple right hepatic lobe cyst noted

## 2025-04-16 NOTE — ASSESSMENT & PLAN NOTE
"Palliative diagnosis: newly diagnosed CNS lymphoma    Symptom management:  No palliative symptom management needs today.  Will continue to monitor patient and treat accordingly.    Goals:  Level 1 code status  Disease focused care without limits placed  Plan for further imaging (MRI C/T/L/S spine) today and plan for initiation of high-dose methotrexate + rituximab inpatient    Decisional apparatus:  Patient does not have capacity on exam today.  If capacity is lost, patient's substitute decision maker would default to spouse and adult children by [place] 169.  ER contacts:  Elizabeth Chester  Daughter  [phone]  Guy Naldo \"Naldo\" Phillip  Spouse  [phone]  Advance Directive/Living Will/POLST: none on file    Social support:  Patient support system: spouse Naldo, 3 adult children, extended family  Patient's sister and niece present at bedside today, spouse Naldo and smita Elizabeth via telephone  Supportive listening provided  Normalized experience of patient/family  Provided anticipatory guidance  Advocated for patient/family with interdisciplinary care team    Coordination of care:  Reviewed case with oncology PATI Curran and Dr. Ortiz    Follow up  Palliative Care will continue to follow and goals of care discussions will be ongoing.   Please reach out via [facility] secure chat if questions or concerns arise.    We appreciate the invitation to be involved in this patient's care.   "

## 2025-04-16 NOTE — PROCEDURES
Insert Complex Venous Access Line    Date/Time: 4/16/2025 3:24 PM    Performed by: Jamee Lombardo RN  Authorized by: Moncho Thorne DO    Patient location:  Bedside  Other Assisting Provider: Yes (comment) (Maddie Rubio,  Tech)    Consent:     Consent obtained:  Verbal    Consent given by:  Healthcare agent (Elizabeth Chester)    Risks discussed:  Arterial puncture, bleeding, infection, incorrect placement, nerve damage and pneumothorax    Alternatives discussed:  No treatment and delayed treatment  Universal protocol:     Procedure explained and questions answered to patient or proxy's satisfaction: yes      Immediately prior to procedure, a time out was called: yes      Relevant documents present and verified: yes      Test results available and properly labeled: yes      Radiology Images displayed and confirmed.  If images not available, report reviewed: yes      Required blood products, implants, devices, and special equipment available: yes      Site/side marked: yes      Patient identity confirmed:  Arm band, provided demographic data, anonymous protocol, patient vented/unresponsive and hospital-assigned identification number  Pre-procedure details:     Hand hygiene: Hand hygiene performed prior to insertion      Sterile barrier technique: All elements of maximal sterile technique followed      Skin preparation:  ChloraPrep    Skin preparation agent: Skin preparation agent completely dried prior to procedure    Procedure details:     Complex Venous Access Line Type: PICC      Complex Venous Access Line Indications: chemotherapy      Catheter tip vessel location: atriocaval junction      Orientation:  Right    Location:  Basilic    Procedural supplies:  Double lumen    Catheter size:  5 Fr    Total catheter length (cm):  39    Catheter out on skin (cm):  0    Max flow rate:  999 ml/hr    Arm circumference:  30.5    Patient evaluated for contraindications to access (i.e. fistula, thrombosis, etc): Yes      Site  selection rationale:  Largest, most patent vein    Approach: percutaneous technique used      Patient position:  Flat    Ultrasound image availability:  Not saved    Sterile ultrasound techniques: Sterile gel and sterile probe covers were used      Number of attempts:  1    Successful placement: yes      Landmarks identified: yes      Vessel of catheter tip end:  Sherlock 3CG confirmed (OK to use PICC)  Anesthesia (see MAR for exact dosages):     Anesthesia method:  Local infiltration    Local anesthetic:  Lidocaine 1% w/o epi (2 ml admin)  Post-procedure details:     Post-procedure:  Dressing applied and securement device placed    Assessment:  Blood return through all ports and free fluid flow (placement verified by Sherlock 3CG)    Post-procedure complications: none      Patient tolerance of procedure:  Tolerated well, no immediate complications

## 2025-04-16 NOTE — PLAN OF CARE
Problem: PAIN - ADULT  Goal: Verbalizes/displays adequate comfort level or baseline comfort level  Description: Interventions:- Encourage patient to monitor pain and request assistance- Assess pain using appropriate pain scale- Administer analgesics based on type and severity of pain and evaluate response- Implement non-pharmacological measures as appropriate and evaluate response- Notify physician/advanced practitioner if interventions unsuccessful or patient reports new pain  Outcome: Progressing     Problem: INFECTION - ADULT  Goal: Absence or prevention of progression during hospitalization  Description: INTERVENTIONS:- Assess and monitor for signs and symptoms of infection- Monitor lab/diagnostic results- Monitor all insertion sites, i.e. indwelling lines, tubes, and drains- [place] appropriate cooling/warming therapies per order- Administer medications as ordered- Instruct and encourage patient and family to use good hand hygiene technique- Identify and instruct in appropriate isolation precautions for identified infection/condition  Outcome: Progressing     Problem: SAFETY ADULT  Goal: Patient will remain free of falls  Description: INTERVENTIONS:- Educate patient/family on patient safety including physical limitations- Instruct patient to call for assistance with activity - Consult OT/PT to assist with strengthening/mobility - Keep Call bell within reach- Keep bed low and locked with side rails adjusted as appropriate- Keep care items and personal belongings within reach- Initiate and maintain comfort rounds- Make Fall Risk Sign visible to staff- Offer Toileting every 2 Hours, in advance of need- Initiate/Maintain bed/chair alarm- Obtain necessary fall risk management equipment.- Apply yellow socks and bracelet for high fall risk patients- Consider moving patient to room near nurses station  Outcome: Progressing  Goal: Maintain or return to baseline ADL function  Description: INTERVENTIONS:-  Assess  patient's ability to carry out ADLs; assess patient's baseline for ADL function and identify physical deficits which impact ability to perform ADLs (bathing, care of mouth/teeth, toileting, grooming, dressing, etc.)- Assess/evaluate cause of self-care deficits - Assess range of motion- Assess patient's mobility; develop plan if impaired- Assess patient's need for assistive devices and provide as appropriate- Encourage maximum independence but intervene and supervise when necessary- Involve family in performance of ADLs- Assess for home care needs following discharge - Consider OT consult to assist with ADL evaluation and planning for discharge- Provide patient education as appropriate  Outcome: Progressing  Goal: Maintains/Returns to pre admission functional level  Description: INTERVENTIONS:- Perform AM-PAC 6 Click Basic Mobility/ Daily Activity assessment daily.- Set and communicate daily mobility goal to care team and patient/family/caregiver. - Collaborate with rehabilitation services on mobility goals if consulted- Reposition patient every 2 hours.- Dangle patient 3 times a day- Stand patient 3 times a day- Ambulate patient 3 times a day- Out of bed to chair 3 times a day - Out of bed for meals 3 times a day- Out of bed for toileting- Record patient progress and toleration of activity level   Outcome: Progressing     Problem: DISCHARGE PLANNING  Goal: Discharge to home or other facility with appropriate resources  Description: INTERVENTIONS:- Identify barriers to discharge w/patient and caregiver- Arrange for needed discharge resources and transportation as appropriate- Identify discharge learning needs (meds, wound care, etc.)- Refer to Case Management Department for coordinating discharge planning if the patient needs post-hospital services based on physician/advanced practitioner order or complex needs related to functional status, cognitive ability, or social support system  Outcome: Progressing      Problem: Knowledge Deficit  Goal: Patient/family/caregiver demonstrates understanding of disease process, treatment plan, medications, and discharge instructions  Description: Complete learning assessment and assess knowledge base.Interventions:- Provide teaching at level of understanding- Provide teaching via preferred learning methods  Outcome: Progressing     Problem: NEUROSENSORY - ADULT  Goal: Achieves stable or improved neurological status  Description: INTERVENTIONS- Monitor and report changes in neurological status- Monitor vital signs such as temperature, blood pressure, glucose, and any other labs ordered - Initiate measures to prevent increased intracranial pressure- Monitor for seizure activity and implement precautions if appropriate    Outcome: Progressing  Goal: Remains free of injury related to seizures activity  Description: INTERVENTIONS- Maintain airway, patient safety  and administer oxygen as ordered- Monitor patient for seizure activity, document and report duration and description of seizure to physician/advanced practitioner- If seizure occurs,  ensure patient safety during seizure- Reorient patient post seizure- Seizure pads on all 4 side rails- Instruct patient/family to notify RN of any seizure activity including if an aura is experienced- Instruct patient/family to call for assistance with activity based on nursing assessment- Administer anti-seizure medications if ordered  Outcome: Progressing  Goal: Achieves maximal functionality and self care  Description: INTERVENTIONS- Monitor swallowing and airway patency with patient fatigue and changes in neurological status- Encourage and assist patient to increase activity and self care. - Encourage visually impaired, hearing impaired and aphasic patients to use assistive/communication devices  Outcome: Progressing     Problem: METABOLIC, FLUID AND ELECTROLYTES - ADULT  Goal: Glucose maintained within target range  Description: INTERVENTIONS:-  Monitor Blood Glucose as ordered- Assess for signs and symptoms of hyperglycemia and hypoglycemia- Administer ordered medications to maintain glucose within target range- Assess nutritional intake and initiate nutrition service referral as needed  Outcome: Progressing     Problem: Prexisting or High Potential for Compromised Skin Integrity  Goal: Skin integrity is maintained or improved  Description: INTERVENTIONS:- Identify patients at risk for skin breakdown- Assess and monitor skin integrity- Assess and monitor nutrition and hydration status- Monitor labs - Assess for incontinence - Turn and reposition patient- Assist with mobility/ambulation- Relieve pressure over bony prominences- Avoid friction and shearing- Provide appropriate hygiene as needed including keeping skin clean and dry- Evaluate need for skin moisturizer/barrier cream- Collaborate with interdisciplinary team - Patient/family teaching- Consider wound care consult   Outcome: Progressing     Problem: SAFETY,RESTRAINT: NV/NON-SELF DESTRUCTIVE BEHAVIOR  Goal: Remains free of harm/injury (restraint for non violent/non self-detsructive behavior)  Description: INTERVENTIONS:- Instruct patient/family regarding restraint use - Assess and monitor physiologic and psychological status - Provide interventions and comfort measures to meet assessed patient needs - Identify and implement measures to help patient regain control- Assess readiness for release of restraint   Outcome: Progressing  Goal: Returns to optimal restraint-free functioning  Description: INTERVENTIONS:- Assess the patient's behavior and symptoms that indicate continued need for restraint- Identify and implement measures to help patient regain control- Assess readiness for release of restraint   Outcome: Progressing

## 2025-04-16 NOTE — ASSESSMENT & PLAN NOTE
Lab Results   Component Value Date    HGBA1C 6.6 (H) 02/22/2025       Recent Labs     04/15/25  1210 04/15/25  1740 04/15/25  2309 04/16/25  0510   POCGLU 222* 192* 157* 165*       Blood Sugar Average: Last 72 hrs:  (P) 167.75

## 2025-04-16 NOTE — CASE MANAGEMENT
Case Management Discharge Planning Note    Patient name Alexis Dennison  Location ICU 03/ICU 03 MRN [id number]  : 1960 Date 2025       Current Admission Date: 3/29/2025  Current Admission Diagnosis:Brain tumor (HCC)   Patient Active Problem List    Diagnosis Date Noted Date Diagnosed    Goals of care, counseling/discussion 2025     Palliative care encounter 2025     Encephalopathy 2025     Constipation 2025     Ambulatory dysfunction 2025     Thyroid nodule 2025     Pulmonary nodule 2025     Liver lesion 2025     Brain tumor (HCC) 2025     Type 2 diabetes mellitus with hyperglycemia, without long-term current use of insulin (HCC) 2022     HTN, goal below 130/80 2022     Mixed hyperlipidemia 2022     Vitamin D deficiency 2022     NAFLD (nonalcoholic fatty liver disease) 2022       LOS (days): 18  Geometric Mean LOS (GMLOS) (days): 11.1  Days to GMLOS:-6.5     OBJECTIVE:  Risk of Unplanned Readmission Score: 21.75         Current admission status: Inpatient   Preferred Pharmacy:   [id number] - [place], [place] - [address] [address]  [address] [address]  Phone: [phone] Fax: [phone]    [facility] - [place], [place] - [address]  [address]  [facility] [address]  Phone: [phone] Fax: [phone]    Primary Care Provider: PATI Mckeon    Primary Insurance: BLUE CROSS  Secondary Insurance: CAPITAL    DISCHARGE DETAILS:                                                                                                 Additional Comments: 63yo male is POD#2 biopsy of brain mass, Has EVD. Family meeting held with hospital team including oncology, plan is to start chemotherapy asap, possibly today. First neeeds PICC or central line. 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 Family appears to have good understanding.  EVD is weaning. Plan MRI on 4/20/25 and possible  shunt on 4/21. Supportive family.

## 2025-04-16 NOTE — ASSESSMENT & PLAN NOTE
Admitted 3/29 with worsening confusion since prior ED presentation and outpatient imaging  Seen at [facility] ED on 3/24 with 3 week history of altered mental status, memory difficulty  CTH 3/24 demonstrated multiple hyperdense ependyma/subependymal nodules  MRI brain 3/26 performed outpatient demonstrating multiple scattered foci of subependymal nodular enhancement with mild hydrocephalus, scattered nodular areas of enhancement in left anterior inferior basal ganglia dn foci of nodular enhancement along anteromedial aspect of left cerebral peduncle and left quirino  CTH 3/29 (this admission)  Stable subependymal nodules and left formamen of De Oliveira region hyperdense lesion with dilation of left lateral ventrical and minimal left to right midline shift  S/p LP 3/31  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   MRI Brain (4/11) showed progression of disease from 3/30 to 4/11 with interval enlargement of the dominant left anterior basal ganglionic mass with greater surrounding vasogenic edema and mass effect and persistent leptomeningeal and intraventricular seeding with obstructive hydrocephalus--current differential lymphoma versus high-grade glioma less likely metastasis  4/13: continued deterioration in mental status, CT Head 4/13 demonstrated left sided obstructive hydrocephalus from lesion located adjacent to formamen of Monro, similar to CT on 4/3; blood products within occiptial horn of left lateral ventricle similar to MRI 4/11; left anterior basal ganglia mass with surrounding edema,  regional mass effect and effacement of left suprasellar cistern, similar to MRI 4/11.  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)    Plan:  MRI C/T/L/S for evaluation of spinal involvement  PICC placement today  Initiation of chemotherapy with high-dose methotrexate and rituximab while inpatient - tentatively on 4/17  Palliative care referral placed  Radiation oncology referral placed  Ophthalmology ambulatory referral placed for discharge (no slit-lamp exam available inpatient)

## 2025-04-16 NOTE — CONSULTS
"Consultation - Palliative Care   Name: Alexis Dennison 64 y.o. male I MRN: [id number]  Unit/Bed#: ICU 03 I Date of Admission: 3/29/2025   Date of Service: 4/16/2025 I Hospital Day: 18   Inpatient consult to Palliative Care  Consult performed by: PATI Montgomery  Consult ordered by: PATI Landeros        Physician Requesting Evaluation: Raciel Smith MD   Reason for Evaluation / Principal Problem: new diagnosis CNS lymphoma    Assessment & Plan  Palliative care encounter  Palliative diagnosis: newly diagnosed CNS lymphoma    Symptom management:  No palliative symptom management needs today.  Will continue to monitor patient and treat accordingly.    Goals:  Level 1 code status  Disease focused care without limits placed  Plan for further imaging (MRI C/T/L/S spine) today and plan for initiation of high-dose methotrexate + rituximab inpatient    Decisional apparatus:  Patient does not have capacity on exam today.  If capacity is lost, patient's substitute decision maker would default to spouse and adult children by [place].  ER contacts:  Elizabeth Chester  Daughter  [phone]  Guy Naldo \"Naldo\" Phillip  Spouse  [phone]  Advance Directive/Living Will/POLST: none on file    Social support:  Patient support system: spouse Naldo, 3 adult children, extended family  Patient's sister and niece present at bedside today, spouse Naldo and smita Elizabeth via telephone  Supportive listening provided  Normalized experience of patient/family  Provided anticipatory guidance  Advocated for patient/family with interdisciplinary care team    Coordination of care:  Reviewed case with oncology PATI Curran and Dr. Ortiz    Follow up  Palliative Care will continue to follow and goals of care discussions will be ongoing.   Please reach out via [employer] secure chat if questions or concerns arise.    We appreciate the invitation to be involved in this patient's care.   Brain tumor (HCC)  Admitted 3/29 with worsening confusion in " the setting of recent ED visit with CTA head and neck 3/24 demonstrating multiple nonspecific hyperdense ependymal/subependymal nodules, largest located adjacent to the foramen of Monro (2 x 1.1 x 1 cm ) and f/u outpatient MRI 3/26 demonstrating multiple scattered foci of subependymal nodular enhancement throughout ventricles (left worse than right) with mild hydrocephalus (left worse than right), scattered nodular areas of enhancement in left anterior inferior basal ganglia involving left anterior commissure, and smaller foci of nodular enhancement along anteromedial aspect of left cerebral peduncle and left quirino.with concern for primary CNS malignancy  Per sister, both patient's parents with history of lymphoma  CTH on admission 3/29 stable subependymal nodules and left foramen of Monro region hyperdense lesion, dilation of left lateral ventricle and minimal left to right midline shift  4/3 imaging concerning for worsening hydrocephalus  2 non diagnostic lumbar punctures performed  MRI brain  with interval disease progression   developed worsening mentation and CTH demonstrated predominantly left sided obstructive hydrocephalus from lesion located adjacent to the foramen of Monro, now s/p left front EVD   underwent left frontal endoscopic biopsy and replacement of EVD  Preliminary pathology large B-cell lymphoma  Plan MRI spinal imaging and initiation of high-dose methotrexate + rituximab inpatient    Medical oncology neurosurgery following  Encephalopathy  In the setting of the above  On exam, he is alert and interactive. Oriented to name only, does not recall , disoriented to all else.  Pulmonary nodule  CTA 3/29 demonstrates non specific 4 mm right lower lobe pulmonary nodule  Recommended 3 mo f/u  Liver lesion  CTA 3/29 demonstrates non specific 12 mm hypodense lesion within right hepatic lobe, MRI recommended  MRI 3/30 demonstrates no suspicious hepatic lesions, simple right hepatic lobe cyst  noted    Palliative Care service will follow.  Please contact the SecureChat role for the Palliative Care service with any questions/concerns.    PDMP Review: I have reviewed the patient's controlled substance dispensing history in the Prescription Drug Monitoring Program in compliance with the [facility] regulations before prescribing any controlled substances.  No opioid or benzo refills in [place] over past year.    History of Present Illness   HPI: Alexis Dennison is a 64 y.o. year old male who presents with worsening confusion, HA, forgetfulness in the setting of recent CTH and MRI brain demonstrating multiple lesions suspicious for lymphoma. Repeat imaging on admission (3/29) demonstrated stable subependymal nodules and left foramen of Monro region hyperdense lesion, dilation of left lateral ventricle and minimal left to right midline shift. Imaging concerning for worsening hydrocephalus 4/3. 2 non-diagnostic lumbar punctures performed. In the setting of worsening mentation  repeat CTH performed demonstrating predominantly left sided obstructive hydrocephalus from lesion located adjacent to the foramen of Monro, s/p left front EVD per neurosurgery.  underwent biopsy and replacement of EVD, preliminary pathology large B-cell lymphoma.    Patient seen today with sister and niece present at bedside. Introduced palliative care services. Joined by oncology team to discuss preliminary pathology findings and treatment plan, spouse and daughter Elizabeth present via telephone in agreement with the plan. On exam, patient is alert/awake, restless, oriented to his name, does not recall  and disoriented to all else. No acute symptom needs on exam today. Goal per family treatment focused, plan for MRI C/T/L/S spine and initiation of inpatient chemotherapy with methotrexate + rituximab.       Review of Systems   Unable to perform ROS: Mental status change     Medical History Review: I have reviewed the patient's PMH, PSH, Social  "History, Family History, Meds, and Allergies     Objective :  Temp:  [97.6 °F (36.4 °C)-98.4 °F (36.9 °C)] 98 °F (36.7 °C)  HR:  [40-54] 52  BP: (104-147)/() 142/55  Resp:  [16-24] 21  SpO2:  [96 %-99 %] 96 %  O2 Device: None (Room air)    Physical Exam  Vitals and nursing note reviewed.   Constitutional:       General: He is not in acute distress.  HENT:      Head:      Comments: EVD in place     Mouth/Throat:      Mouth: Mucous membranes are moist.   Cardiovascular:      Rate and Rhythm: Bradycardia present.   Pulmonary:      Effort: Pulmonary effort is normal. No respiratory distress.   Abdominal:      General: There is no distension.      Palpations: Abdomen is soft.   Skin:     General: Skin is warm.   Neurological:      Mental Status: He is alert. He is disoriented.   Psychiatric:         Behavior: Behavior is cooperative.         Cognition and Memory: Cognition is impaired.          Lab Results: I have reviewed the following results:  Lab Results   Component Value Date/Time    SODIUM 137 04/16/2025 05:11 AM    SODIUM 137 09/22/2023 09:40 AM    K 4.0 04/16/2025 05:11 AM    K 4.2 09/22/2023 09:40 AM    BUN 17 04/16/2025 05:11 AM    BUN 18 09/22/2023 09:40 AM    CREATININE 0.71 04/16/2025 05:11 AM    GLUC 169 (H) 04/16/2025 05:11 AM    GLUC 147 (H) 09/22/2023 09:40 AM    CALCIUM 8.8 04/16/2025 05:11 AM    CALCIUM 9.7 09/22/2023 09:40 AM    AST 15 04/16/2025 05:11 AM    AST 28 09/22/2023 09:40 AM    ALT 37 04/16/2025 05:11 AM    ALT 44 09/22/2023 09:40 AM    ALB 3.7 04/16/2025 05:11 AM    TP 6.0 (L) 04/16/2025 05:11 AM    TP 7.3 09/22/2023 09:40 AM    EGFR 98 04/16/2025 05:11 AM     Lab Results   Component Value Date/Time    HGB 13.3 04/16/2025 05:11 AM    WBC 11.53 (H) 04/16/2025 05:11 AM     (L) 04/16/2025 05:11 AM    INR 1.07 04/15/2025 05:30 AM    PTT 30 04/15/2025 05:30 AM     No results found for: \"[id number]\"    Imaging Results Review: I reviewed radiology reports from this admission " including: chest xray, CT chest, CT abdomen/pelvis, CT head, MRI brain, and procedure reports.  Other Study Results Review: EKG was reviewed.     Code Status: Level 1 - Full Code  Advance Directive and Living Will:      Power of :    POLST:      Administrative Statements   Counseling / Coordination of Care  Total floor / unit time spent today 50+ minutes. Greater than 50% of total time was spent with the patient and / or family counseling and / or coordination of care. A description of the counseling / coordination of care:  psychosocial support, chart review, imaging review, lab review, goals of care, supportive listening, anticipatory guidance, and coordination with oncology

## 2025-04-16 NOTE — ANESTHESIA POSTPROCEDURE EVALUATION
Post-Op Assessment Note    Last Filed PACU Vitals:  Vitals Value Taken Time   Temp 97.6 °F (36.4 °C) 04/16/25 0400   Pulse 54 04/16/25 0900   /74 04/16/25 0854   Resp 31 04/16/25 0900   SpO2 97 % 04/16/25 0900   Vitals shown include unfiled device data.

## 2025-04-16 NOTE — ASSESSMENT & PLAN NOTE
POD 2 Left frontal endoscopic biopsy of ventricular mass and replacement of EVD  ([facility], 4/14/25)  Subependymal lesions with large left basal ganglia suspicious for lymphoma.  P/w confusion, short term memory loss.  Patient had multiple nondiagnostic LPs and hematology/oncology was requesting a biopsy to confirm.  4/13 - patient had worsening mental status change and CT head demonstrated progressive ventriculomegaly and likely trapped left lateral ventricle, ultimately a left frontal EVD was placed.  4/14 - patient self extubated.  Preliminary pathology, small round blue cell tumor; differential Dx includes lymphoma   EVD replaced during surgery on 4/14    Imaging:  CT head wo 4/14/25:Left frontal approach ventriculostomy catheter terminating near the foramen of Monro with increased left frontal lobe pneumocephalus and gas within the left frontal horn since prior study. Slightly increased small amount of layering intraventricular hemorrhage within the left occipital horn. Improved hydrocephalus primarily of the left lateral ventricle since prior study.Stable hyperdense mass centered within the left basal ganglia with surrounding vasogenic edema and mild rightward midline shift    Plan:  Continue to monitor neuro exam closely.  STAT CT head with any neurological decline including drop GCS of 2pts within 1 hr.  Hold all AC/AP medication.  Medical management and pain control per primary team.  Keppra x1 week post op for seizure prophylaxis  Left EVD in place.  Continue EVD raised to 20 mmhg today.   Monitor output and ICP. Output 0 ml/24H.  ICP 0-7/24H. ICP in room 4.  CSF sent for cytology as well as leukemia/lymphoma flow cytometry, continue to follow results.  SBP <160.  On Decadron 4 mg every 6 hours    Mobilize as tolerated.  DVT ppx: SCDs and SQH  Patient's case and imaging were discussed at [facility] this morning, Oncology starting high dose MTX today.    Hematology oncology following, input appreciated  Plan to  repeat MRI BT protocol Sunday.     Neurosurgery will continue to follow, call with any further questions or concerns.

## 2025-04-16 NOTE — PROGRESS NOTES
Progress Note - Critical Care/ICU   Name: Alexis Dennison 64 y.o. male I MRN: [id number]  Unit/Bed#: ICU 03 I Date of Admission: 3/29/2025   Date of Service: 4/16/2025 I Hospital Day: 18       Assessment & Plan   Active Hospital Problems    Diagnosis Date Noted POA    Brain tumor (HCC) 03/29/2025 Yes    Encephalopathy 04/02/2025 Yes    Constipation 04/01/2025 No    Ambulatory dysfunction 04/01/2025 Yes    Thyroid nodule 03/30/2025 Yes    Pulmonary nodule 03/30/2025 Yes    Liver lesion 03/30/2025 Yes    Type 2 diabetes mellitus with hyperglycemia, without long-term current use of insulin (HCC) 06/14/2022 Yes     Chronic    Mixed hyperlipidemia 06/14/2022 Yes     Chronic    HTN, goal below 130/80 06/14/2022 Yes     Chronic      Resolved Hospital Problems    Diagnosis Date Noted Date Resolved POA    Foot erythema 04/12/2025 04/14/2025 Unknown    Abnormal CT scan 03/29/2025 03/30/2025 Yes     Neuro:   Diagnoses: L anterior basal ganglia mass with surrounding edema, obstructive hydrocephalus, small amount of IVH  4/11 MRI: Interval enlargement of the dominant left anterior basal ganglionic mass lesion with greater surrounding vasogenic edema and mass effect. 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  4/13 CT head: largely unchanged left sided obstructive hydrocephalus, blood products within the occipital horn of the left lateral ventricle is similar from the MRI brain 4/11. Left anterior basal ganglia mass with surrounding edema, regional mass effect, and effacement of the left suprasellar cistern, appears similar to MRI of the brain 4/11/2025  S/P Bedside EVD placement for obstructive hydrocephalus on 4/13  S/P Biopsy of brain mass via Neurosurgery and replaced EVD  4/14 CT H: EVD in place, improved hydrocephalus, mass with vasogenic edema stable    PLAN:   Neuro surgery following, appreciate reccs  EVD monitoring at 15  Neuro  checks every 2 hours  OR Biopsy 4/14  Decadron q6hr, Keppra BID  Repeat CT head with change in GCS > 2 points in 1 hour  Daily CAM-ICU, delirium precautions. Regulate sleep/wake cycle.       CV:  Diagnoses: Hx of HTN, HLD  PLAN:   Home regimen: Hyzaar 100-25mg   Hold losartan/HCTZ  Continue statin  Goal normotension, Continue to monitor cardiopulmonary status maintain MAP>65 mmHg.     Pulm:  Diagnoses: No active issues  PLAN:  Monitor and Maintain for SpO2 > 92%.     GI:  Diagnoses: Hx of GERD  PLAN:  Protonix daily  Bowel regimen: Senokot, miralax, daily dulcolax suppository   Last BM: 4/15     :  Diagnoses: No active issues  PLAN:   Trend strict I/Os, UOP  Continue to trend Cr     F/E/N:  F: Fluid resuscitate prn  E: Replete electrolytes as needed for goal Mag > 2.0, Phos >3.0, K >4.0  N: NPO pending speech eval, restart diet following procedure     Heme/Onc:  Diagnoses: Concern for CNS Lymphoma, see above in Neuro  PLAN:  Biopsy of L anterior basal ganglia mass via Neurosurgery 4/14  Pathology Pending, preliminary Large Cell Lymphoma  VTE: Restart SQH today, SCDs only     Endo:   Diagnoses: Hx of DM2  PLAN:  Hold home metformin and janumet  SSI  Monitor BG for Goal 140-160     ID:  Diagnoses: No active issues  PLAN:  Trend fever curve/white count.     MSK/Skin:  Diagnoses: No active Issues  PLAN:  Frequent turns/repositioning, offloading  PT/OT         Disposition: Critical care    ICU Core Measures     A: Assess, Prevent, and Manage Pain Has pain been assessed? Yes  Need for changes to pain regimen? No   B: Both SAT/SAT  N/A   C: Choice of Sedation RASS Goal: 0 Alert and Calm  Need for changes to sedation or analgesia regimen? No   D: Delirium CAM-ICU: Negative   E: Early Mobility  Plan for early mobility? Yes   F: Family Engagement Plan for family engagement today? Yes         Prophylaxis:  VTE Contraindicated secondary to: Brain Mass and Biopsy Today   Stress Ulcer  covered byomeprazole (PriLOSEC) 20 mg  delayed release capsule [[id number]] (Long-Term Med), pantoprazole (PROTONIX) injection 40 mg [[id number]]         24 Hour Events : Patient had no acute O/N events. His mentation continues to be waxing and waning. Otherwise, no acute issues over 24 hours.    Subjective       Objective :                   Vitals I/O      Most Recent Min/Max in 24hrs   Temp 97.6 °F (36.4 °C) Temp  Min: 97.6 °F (36.4 °C)  Max: 98.4 °F (36.9 °C)   Pulse (!) 40 Pulse  Min: 40  Max: 54   Resp 18 Resp  Min: 15  Max: 24   /65 BP  Min: 100/58  Max: 140/67   O2 Sat 96 % SpO2  Min: 96 %  Max: 99 %      Intake/Output Summary (Last 24 hours) at 4/16/2025 0755  Last data filed at 4/16/2025 0640  Gross per 24 hour   Intake 2810 ml   Output 1392 ml   Net 1418 ml       Diet Dysphagia/Modified Consistency; Dysphagia 3-Dental Soft; Thin Liquid    Invasive Monitoring           Physical Exam   Physical Exam  Vitals reviewed.   Eyes:      Extraocular Movements: Extraocular movements intact.      Pupils: Pupils are equal, round, and reactive to light.   Skin:     General: Skin is warm and dry.   HENT:      Head:      Comments: EVD  Cardiovascular:      Rate and Rhythm: Normal rate and regular rhythm.      Pulses: Normal pulses.   Musculoskeletal:         General: Normal range of motion.      Right lower leg: No edema.      Left lower leg: No edema.   Abdominal: General: Bowel sounds are normal. There is no distension.      Palpations: Abdomen is soft.      Tenderness: There is no abdominal tenderness.   Constitutional:       General: He is not in acute distress.  Pulmonary:      Effort: Pulmonary effort is normal.      Breath sounds: Normal breath sounds.   Neurological:      Comments: Patient is AAOx2, unable to recall place, Follows Motor Commands with prompting, waxing and waning          Diagnostic Studies        Lab Results: I have reviewed the following results:     Medications:  Scheduled PRN   acetaminophen, 1,000 mg, Q6H CAIT  atorvastatin, 20  mg, Daily  bisacodyl, 10 mg, Daily  chlorhexidine, 15 mL, Q12H CAIT  [Held by provider] Cholecalciferol, 2,000 Units, Daily  [Held by provider] vitamin B-12, 1,000 mcg, Daily  dexamethasone, 4 mg, Q6H CAIT  heparin (porcine), 5,000 Units, Q12H CAIT  [Held by provider] losartan, 100 mg, Daily   And  [Held by provider] hydroCHLOROthiazide, 25 mg, Daily  insulin lispro, 1-6 Units, Q6H  levETIRAcetam, 500 mg, Q12H CAIT  pantoprazole, 40 mg, Early Morning  polyethylene glycol, 17 g, Daily  senna, 2 tablet, HS      aluminum-magnesium hydroxide-simethicone, 30 mL, Q4H PRN  calcium carbonate, 1,000 mg, Daily PRN  HYDROmorphone, 0.2 mg, Q2H PRN  ondansetron, 4 mg, Q6H PRN  oxyCODONE, 2.5 mg, Q4H PRN   Or  oxyCODONE, 5 mg, Q4H PRN       Continuous            Labs:   CBC    Recent Labs     04/15/25  0530 04/16/25  0511   WBC 13.12* 11.53*   HGB 13.7 13.3   HCT 37.4 36.7    145*     BMP    Recent Labs     04/15/25  0530 04/16/25  0511   SODIUM 138 137   K 4.1 4.0    107   CO2 23 23   AGAP 10 7   BUN 17 17   CREATININE 0.85 0.71   CALCIUM 9.1 8.8       Coags    Recent Labs     04/15/25  0530   INR 1.07   PTT 30        Additional Electrolytes  Recent Labs     04/15/25  0530 04/16/25  0511   MG 2.2 2.1   PHOS 3.5 3.0          Blood Gas    No recent results  No recent results   LFTs  No recent results    Infectious  No recent results  Glucose  Recent Labs     04/15/25  0530 04/16/25  0511   GLUC 176* 169*

## 2025-04-16 NOTE — PROGRESS NOTES
Progress Note - Critical Care/ICU   Name: Alexis Dennison 64 y.o. male I MRN: [id number]  Unit/Bed#: ICU 03 I Date of Admission: 3/29/2025   Date of Service: 4/16/2025 I Hospital Day: 18       This is an attestation of Resident note    24 hour events:  EVD 0 cc out, however ICP low 2-5 and drain level at 15   Did have several bowel movements yesterday  Discussed with Dr. Miller oncology is starting high dose MTX today  Needs MRI Sunday     Physical exam:  When I walked into room he was sitting up in bed with EVD open at 15 cm, tracked me briefly  Knows name, but not place or time. Was telling me he was having a bad day because he got into an argument with his boss  EOM grossly intact struggles to maintain command following throughout physical exam  Requires prompting to follow commands, strength grossly equal throughout      Impression:  Hyperdense left basal ganglia with surrounding vasogenic edema with nonspecific 12 mm hypodense right hepatic lobe lesion and nonspecific 4 mm right pulm lower lobe lesion  Obstructive hydrocephalus s/p EVD 4/13  Encephalopathy   Heterogenous nodular enlargement of left thyroid lobe  Delirium   Pathology waiting for final confirmation overall most consistent with high grade lymphoma but not yet confirmed     Plan:  EVD open at 15 cm management per neurosurgery, plan for slow wean  Redirect for delirium   Oncology to start high dose methotrexate once pathology confirmed    Decadron  Glucose control SSI  DVT ppx BID  SLP following, dysphagia diet, cont bowel regimen   Home PPI  Remove getachew  Dispo: Remain in ICU while EVD in place

## 2025-04-16 NOTE — ASSESSMENT & PLAN NOTE
Admitted 3/29 with worsening confusion in the setting of recent ED visit with CTA head and neck 3/24 demonstrating multiple nonspecific hyperdense ependymal/subependymal nodules, largest located adjacent to the foramen of Monro (2 x 1.1 x 1 cm ) and f/u outpatient MRI 3/26 demonstrating multiple scattered foci of subependymal nodular enhancement throughout ventricles (left worse than right) with mild hydrocephalus (left worse than right), scattered nodular areas of enhancement in left anterior inferior basal ganglia involving left anterior commissure, and smaller foci of nodular enhancement along anteromedial aspect of left cerebral peduncle and left quirino.with concern for primary CNS malignancy  Per sister, both patient's parents with history of lymphoma  CTH on admission 3/29 stable subependymal nodules and left foramen of Monro region hyperdense lesion, dilation of left lateral ventricle and minimal left to right midline shift  4/3 imaging concerning for worsening hydrocephalus  2 non diagnostic lumbar punctures performed  MRI brain 4/11 with interval disease progression  4/13 developed worsening mentation and CTH demonstrated predominantly left sided obstructive hydrocephalus from lesion located adjacent to the foramen of Monro, now s/p left front EVD  4/14 underwent left frontal endoscopic biopsy and replacement of EVD  Preliminary pathology large B-cell lymphoma  Plan MRI spinal imaging and initiation of high-dose methotrexate + rituximab inpatient    Medical oncology neurosurgery following

## 2025-04-16 NOTE — PLAN OF CARE
Problem: PAIN - ADULT  Goal: Verbalizes/displays adequate comfort level or baseline comfort level  Description: Interventions:- Encourage patient to monitor pain and request assistance- Assess pain using appropriate pain scale- Administer analgesics based on type and severity of pain and evaluate response- Implement non-pharmacological measures as appropriate and evaluate response- Notify physician/advanced practitioner if interventions unsuccessful or patient reports new pain  Outcome: Progressing     Problem: INFECTION - ADULT  Goal: Absence or prevention of progression during hospitalization  Description: INTERVENTIONS:- Assess and monitor for signs and symptoms of infection- Monitor lab/diagnostic results- Monitor all insertion sites, i.e. indwelling lines, tubes, and drains- [place] appropriate cooling/warming therapies per order- Administer medications as ordered- Instruct and encourage patient and family to use good hand hygiene technique- Identify and instruct in appropriate isolation precautions for identified infection/condition  Outcome: Progressing     Problem: SAFETY ADULT  Goal: Patient will remain free of falls  Description: INTERVENTIONS:- Educate patient/family on patient safety including physical limitations- Instruct patient to call for assistance with activity - Consult OT/PT to assist with strengthening/mobility - Keep Call bell within reach- Keep bed low and locked with side rails adjusted as appropriate- Keep care items and personal belongings within reach- Initiate and maintain comfort rounds- Make Fall Risk Sign visible to staff- Offer Toileting every 2 Hours, in advance of need- Initiate/Maintain bed/chair alarm- Obtain necessary fall risk management equipment.- Apply yellow socks and bracelet for high fall risk patients- Consider moving patient to room near nurses station  Outcome: Progressing  Goal: Maintain or return to baseline ADL function  Description: INTERVENTIONS:-  Assess  patient's ability to carry out ADLs; assess patient's baseline for ADL function and identify physical deficits which impact ability to perform ADLs (bathing, care of mouth/teeth, toileting, grooming, dressing, etc.)- Assess/evaluate cause of self-care deficits - Assess range of motion- Assess patient's mobility; develop plan if impaired- Assess patient's need for assistive devices and provide as appropriate- Encourage maximum independence but intervene and supervise when necessary- Involve family in performance of ADLs- Assess for home care needs following discharge - Consider OT consult to assist with ADL evaluation and planning for discharge- Provide patient education as appropriate  Outcome: Progressing  Goal: Maintains/Returns to pre admission functional level  Description: INTERVENTIONS:- Perform AM-PAC 6 Click Basic Mobility/ Daily Activity assessment daily.- Set and communicate daily mobility goal to care team and patient/family/caregiver. - Collaborate with rehabilitation services on mobility goals if consulted- Reposition patient every 2 hours.- Dangle patient 3 times a day- Stand patient 3 times a day- Ambulate patient 3 times a day- Out of bed to chair 3 times a day - Out of bed for meals 3 times a day- Out of bed for toileting- Record patient progress and toleration of activity level   Outcome: Progressing     Problem: DISCHARGE PLANNING  Goal: Discharge to home or other facility with appropriate resources  Description: INTERVENTIONS:- Identify barriers to discharge w/patient and caregiver- Arrange for needed discharge resources and transportation as appropriate- Identify discharge learning needs (meds, wound care, etc.)- Refer to Case Management Department for coordinating discharge planning if the patient needs post-hospital services based on physician/advanced practitioner order or complex needs related to functional status, cognitive ability, or social support system  Outcome: Progressing      Problem: Knowledge Deficit  Goal: Patient/family/caregiver demonstrates understanding of disease process, treatment plan, medications, and discharge instructions  Description: Complete learning assessment and assess knowledge base.Interventions:- Provide teaching at level of understanding- Provide teaching via preferred learning methods  Outcome: Progressing     Problem: NEUROSENSORY - ADULT  Goal: Achieves stable or improved neurological status  Description: INTERVENTIONS- Monitor and report changes in neurological status- Monitor vital signs such as temperature, blood pressure, glucose, and any other labs ordered - Initiate measures to prevent increased intracranial pressure- Monitor for seizure activity and implement precautions if appropriate    Outcome: Progressing  Goal: Remains free of injury related to seizures activity  Description: INTERVENTIONS- Maintain airway, patient safety  and administer oxygen as ordered- Monitor patient for seizure activity, document and report duration and description of seizure to physician/advanced practitioner- If seizure occurs,  ensure patient safety during seizure- Reorient patient post seizure- Seizure pads on all 4 side rails- Instruct patient/family to notify RN of any seizure activity including if an aura is experienced- Instruct patient/family to call for assistance with activity based on nursing assessment- Administer anti-seizure medications if ordered  Outcome: Progressing  Goal: Achieves maximal functionality and self care  Description: INTERVENTIONS- Monitor swallowing and airway patency with patient fatigue and changes in neurological status- Encourage and assist patient to increase activity and self care. - Encourage visually impaired, hearing impaired and aphasic patients to use assistive/communication devices  Outcome: Progressing     Problem: METABOLIC, FLUID AND ELECTROLYTES - ADULT  Goal: Glucose maintained within target range  Description: INTERVENTIONS:-  Monitor Blood Glucose as ordered- Assess for signs and symptoms of hyperglycemia and hypoglycemia- Administer ordered medications to maintain glucose within target range- Assess nutritional intake and initiate nutrition service referral as needed  Outcome: Progressing     Problem: Prexisting or High Potential for Compromised Skin Integrity  Goal: Skin integrity is maintained or improved  Description: INTERVENTIONS:- Identify patients at risk for skin breakdown- Assess and monitor skin integrity- Assess and monitor nutrition and hydration status- Monitor labs - Assess for incontinence - Turn and reposition patient- Assist with mobility/ambulation- Relieve pressure over bony prominences- Avoid friction and shearing- Provide appropriate hygiene as needed including keeping skin clean and dry- Evaluate need for skin moisturizer/barrier cream- Collaborate with interdisciplinary team - Patient/family teaching- Consider wound care consult   Outcome: Progressing     Problem: SAFETY,RESTRAINT: NV/NON-SELF DESTRUCTIVE BEHAVIOR  Goal: Remains free of harm/injury (restraint for non violent/non self-detsructive behavior)  Description: INTERVENTIONS:- Instruct patient/family regarding restraint use - Assess and monitor physiologic and psychological status - Provide interventions and comfort measures to meet assessed patient needs - Identify and implement measures to help patient regain control- Assess readiness for release of restraint   Outcome: Progressing  Goal: Returns to optimal restraint-free functioning  Description: INTERVENTIONS:- Assess the patient's behavior and symptoms that indicate continued need for restraint- Identify and implement measures to help patient regain control- Assess readiness for release of restraint   Outcome: Progressing

## 2025-04-17 ENCOUNTER — APPOINTMENT (INPATIENT)
Dept: RADIOLOGY | Facility: HOSPITAL | Age: 65
DRG: 820 | End: 2025-04-17
Payer: COMMERCIAL

## 2025-04-17 LAB
ALBUMIN SERPL BCG-MCNC: 3.7 G/DL (ref 3.5–5)
ALP SERPL-CCNC: 44 U/L (ref 34–104)
ALT SERPL W P-5'-P-CCNC: 33 U/L (ref 7–52)
ANION GAP SERPL CALCULATED.3IONS-SCNC: 6 MMOL/L (ref 4–13)
AST SERPL W P-5'-P-CCNC: 11 U/L (ref 13–39)
BACTERIA UR QL AUTO: ABNORMAL /HPF
BASOPHILS # BLD AUTO: 0 THOUSANDS/ÂΜL (ref 0–0.1)
BASOPHILS NFR BLD AUTO: 0 % (ref 0–1)
BILIRUB DIRECT SERPL-MCNC: 0.12 MG/DL (ref 0–0.2)
BILIRUB SERPL-MCNC: 0.5 MG/DL (ref 0.2–1)
BILIRUB UR QL STRIP: NEGATIVE
BUN SERPL-MCNC: 23 MG/DL (ref 5–25)
CALCIUM SERPL-MCNC: 9.1 MG/DL (ref 8.4–10.2)
CHLORIDE SERPL-SCNC: 104 MMOL/L (ref 96–108)
CLARITY UR: CLEAR
CO2 SERPL-SCNC: 26 MMOL/L (ref 21–32)
COLOR UR: ABNORMAL
COLOR UR: ABNORMAL
COLOR UR: COLORLESS
CREAT SERPL-MCNC: 0.76 MG/DL (ref 0.6–1.3)
EOSINOPHIL # BLD AUTO: 0 THOUSAND/ÂΜL (ref 0–0.61)
EOSINOPHIL NFR BLD AUTO: 0 % (ref 0–6)
ERYTHROCYTE [DISTWIDTH] IN BLOOD BY AUTOMATED COUNT: 11.8 % (ref 11.6–15.1)
GFR SERPL CREATININE-BSD FRML MDRD: 95 ML/MIN/1.73SQ M
GLUCOSE SERPL-MCNC: 129 MG/DL (ref 65–140)
GLUCOSE SERPL-MCNC: 187 MG/DL (ref 65–140)
GLUCOSE SERPL-MCNC: 216 MG/DL (ref 65–140)
GLUCOSE SERPL-MCNC: 232 MG/DL (ref 65–140)
GLUCOSE SERPL-MCNC: 275 MG/DL (ref 65–140)
GLUCOSE UR STRIP-MCNC: ABNORMAL MG/DL
HCT VFR BLD AUTO: 39.1 % (ref 36.5–49.3)
HGB BLD-MCNC: 13.7 G/DL (ref 12–17)
HGB UR QL STRIP.AUTO: ABNORMAL
IMM GRANULOCYTES # BLD AUTO: 0.03 THOUSAND/UL (ref 0–0.2)
IMM GRANULOCYTES NFR BLD AUTO: 0 % (ref 0–2)
KETONES UR STRIP-MCNC: NEGATIVE MG/DL
LEUKOCYTE ESTERASE UR QL STRIP: ABNORMAL
LYMPHOCYTES # BLD AUTO: 0.97 THOUSANDS/ÂΜL (ref 0.6–4.47)
LYMPHOCYTES NFR BLD AUTO: 14 % (ref 14–44)
MAGNESIUM SERPL-MCNC: 2.1 MG/DL (ref 1.9–2.7)
MCH RBC QN AUTO: 31.4 PG (ref 26.8–34.3)
MCHC RBC AUTO-ENTMCNC: 35 G/DL (ref 31.4–37.4)
MCV RBC AUTO: 90 FL (ref 82–98)
MONOCYTES # BLD AUTO: 0.34 THOUSAND/ÂΜL (ref 0.17–1.22)
MONOCYTES NFR BLD AUTO: 5 % (ref 4–12)
NEUTROPHILS # BLD AUTO: 5.54 THOUSANDS/ÂΜL (ref 1.85–7.62)
NEUTS SEG NFR BLD AUTO: 81 % (ref 43–75)
NITRITE UR QL STRIP: NEGATIVE
NON-SQ EPI CELLS URNS QL MICRO: ABNORMAL /HPF
NRBC BLD AUTO-RTO: 0 /100 WBCS
OTHER STN SPEC: ABNORMAL
PH UR STRIP.AUTO: 6 [PH]
PHOSPHATE SERPL-MCNC: 3.3 MG/DL (ref 2.3–4.1)
PLATELET # BLD AUTO: 149 THOUSANDS/UL (ref 149–390)
PMV BLD AUTO: 11.2 FL (ref 8.9–12.7)
POTASSIUM SERPL-SCNC: 4.1 MMOL/L (ref 3.5–5.3)
PROT SERPL-MCNC: 6.1 G/DL (ref 6.4–8.4)
PROT UR STRIP-MCNC: NEGATIVE MG/DL
RBC # BLD AUTO: 4.37 MILLION/UL (ref 3.88–5.62)
RBC #/AREA URNS AUTO: ABNORMAL /HPF
SODIUM SERPL-SCNC: 136 MMOL/L (ref 135–147)
SP GR UR STRIP.AUTO: 1.01 (ref 1–1.03)
SP GR UR STRIP.AUTO: 1.02 (ref 1–1.03)
SP GR UR STRIP.AUTO: 1.02 (ref 1–1.03)
UROBILINOGEN UR STRIP-ACNC: <2 MG/DL
WBC # BLD AUTO: 6.88 THOUSAND/UL (ref 4.31–10.16)
WBC #/AREA URNS AUTO: ABNORMAL /HPF

## 2025-04-17 PROCEDURE — 97530 THERAPEUTIC ACTIVITIES: CPT

## 2025-04-17 PROCEDURE — 82948 REAGENT STRIP/BLOOD GLUCOSE: CPT

## 2025-04-17 PROCEDURE — 97535 SELF CARE MNGMENT TRAINING: CPT

## 2025-04-17 PROCEDURE — 81001 URINALYSIS AUTO W/SCOPE: CPT | Performed by: INTERNAL MEDICINE

## 2025-04-17 PROCEDURE — 84100 ASSAY OF PHOSPHORUS: CPT

## 2025-04-17 PROCEDURE — 99024 POSTOP FOLLOW-UP VISIT: CPT | Performed by: STUDENT IN AN ORGANIZED HEALTH CARE EDUCATION/TRAINING PROGRAM

## 2025-04-17 PROCEDURE — 97112 NEUROMUSCULAR REEDUCATION: CPT

## 2025-04-17 PROCEDURE — 80076 HEPATIC FUNCTION PANEL: CPT

## 2025-04-17 PROCEDURE — 99233 SBSQ HOSP IP/OBS HIGH 50: CPT | Performed by: PHYSICIAN ASSISTANT

## 2025-04-17 PROCEDURE — 83735 ASSAY OF MAGNESIUM: CPT

## 2025-04-17 PROCEDURE — 85025 COMPLETE CBC W/AUTO DIFF WBC: CPT

## 2025-04-17 PROCEDURE — 99233 SBSQ HOSP IP/OBS HIGH 50: CPT | Performed by: INTERNAL MEDICINE

## 2025-04-17 PROCEDURE — NC001 PR NO CHARGE: Performed by: ANESTHESIOLOGY

## 2025-04-17 PROCEDURE — 99233 SBSQ HOSP IP/OBS HIGH 50: CPT | Performed by: ANESTHESIOLOGY

## 2025-04-17 PROCEDURE — 80048 BASIC METABOLIC PNL TOTAL CA: CPT

## 2025-04-17 RX ORDER — INSULIN LISPRO 100 [IU]/ML
2-12 INJECTION, SOLUTION INTRAVENOUS; SUBCUTANEOUS EVERY 6 HOURS SCHEDULED
Status: DISCONTINUED | OUTPATIENT
Start: 2025-04-17 | End: 2025-04-18

## 2025-04-17 RX ORDER — DIPHENHYDRAMINE HYDROCHLORIDE 50 MG/ML
25 INJECTION, SOLUTION INTRAMUSCULAR; INTRAVENOUS ONCE
Status: CANCELLED
Start: 2025-04-18 | End: 2025-04-18

## 2025-04-17 RX ORDER — DEXMEDETOMIDINE HYDROCHLORIDE 4 UG/ML
.1-.7 INJECTION, SOLUTION INTRAVENOUS
Status: DISCONTINUED | OUTPATIENT
Start: 2025-04-17 | End: 2025-04-17

## 2025-04-17 RX ORDER — LORAZEPAM 2 MG/ML
0.5 INJECTION INTRAMUSCULAR ONCE AS NEEDED
Status: DISCONTINUED | OUTPATIENT
Start: 2025-04-17 | End: 2025-04-17

## 2025-04-17 RX ORDER — SODIUM CHLORIDE 9 MG/ML
20 INJECTION, SOLUTION INTRAVENOUS ONCE AS NEEDED
Status: DISCONTINUED | OUTPATIENT
Start: 2025-04-17 | End: 2025-05-01

## 2025-04-17 RX ORDER — DIPHENHYDRAMINE HYDROCHLORIDE 50 MG/ML
25 INJECTION, SOLUTION INTRAMUSCULAR; INTRAVENOUS ONCE
Status: COMPLETED | OUTPATIENT
Start: 2025-04-17 | End: 2025-04-17

## 2025-04-17 RX ORDER — HALOPERIDOL 5 MG/ML
5 INJECTION INTRAMUSCULAR ONCE
Status: COMPLETED | OUTPATIENT
Start: 2025-04-17 | End: 2025-04-17

## 2025-04-17 RX ORDER — ALLOPURINOL 300 MG/1
300 TABLET ORAL DAILY
Status: DISCONTINUED | OUTPATIENT
Start: 2025-04-17 | End: 2025-05-07 | Stop reason: HOSPADM

## 2025-04-17 RX ORDER — ACETAMINOPHEN 325 MG/1
650 TABLET ORAL
Qty: 8 TABLET | Refills: 11 | Status: ON HOLD | OUTPATIENT
Start: 2025-04-17

## 2025-04-17 RX ORDER — LORAZEPAM 2 MG/ML
1 INJECTION INTRAMUSCULAR ONCE AS NEEDED
Status: COMPLETED | OUTPATIENT
Start: 2025-04-17 | End: 2025-04-17

## 2025-04-17 RX ORDER — ACETAMINOPHEN 325 MG/1
650 TABLET ORAL ONCE
Status: COMPLETED | OUTPATIENT
Start: 2025-04-17 | End: 2025-04-17

## 2025-04-17 RX ORDER — LORAZEPAM 2 MG/ML
1 INJECTION INTRAMUSCULAR ONCE
Status: DISCONTINUED | OUTPATIENT
Start: 2025-04-17 | End: 2025-04-17

## 2025-04-17 RX ORDER — LORAZEPAM 2 MG/ML
2 INJECTION INTRAMUSCULAR ONCE
Status: COMPLETED | OUTPATIENT
Start: 2025-04-17 | End: 2025-04-17

## 2025-04-17 RX ADMIN — LORAZEPAM 0.5 MG: 2 INJECTION INTRAMUSCULAR; INTRAVENOUS at 22:40

## 2025-04-17 RX ADMIN — CHLORHEXIDINE GLUCONATE 0.12% ORAL RINSE 15 ML: 1.2 LIQUID ORAL at 08:37

## 2025-04-17 RX ADMIN — ACETAMINOPHEN 650 MG: 325 TABLET, FILM COATED ORAL at 16:43

## 2025-04-17 RX ADMIN — LORAZEPAM 0.5 MG: 2 INJECTION INTRAMUSCULAR; INTRAVENOUS at 22:45

## 2025-04-17 RX ADMIN — LORAZEPAM 1 MG: 2 INJECTION INTRAMUSCULAR; INTRAVENOUS at 22:33

## 2025-04-17 RX ADMIN — RITUXIMAB 800 MG: 10 INJECTION, SOLUTION INTRAVENOUS at 16:59

## 2025-04-17 RX ADMIN — HEPARIN SODIUM 5000 UNITS: 5000 INJECTION INTRAVENOUS; SUBCUTANEOUS at 21:14

## 2025-04-17 RX ADMIN — LEVETIRACETAM 500 MG: 500 TABLET, FILM COATED ORAL at 21:14

## 2025-04-17 RX ADMIN — PANTOPRAZOLE SODIUM 40 MG: 40 TABLET, DELAYED RELEASE ORAL at 06:07

## 2025-04-17 RX ADMIN — LORAZEPAM 0.5 MG: 2 INJECTION INTRAMUSCULAR; INTRAVENOUS at 01:18

## 2025-04-17 RX ADMIN — LEVETIRACETAM 500 MG: 500 TABLET, FILM COATED ORAL at 08:37

## 2025-04-17 RX ADMIN — ATORVASTATIN CALCIUM 20 MG: 20 TABLET, FILM COATED ORAL at 08:37

## 2025-04-17 RX ADMIN — HEPARIN SODIUM 5000 UNITS: 5000 INJECTION INTRAVENOUS; SUBCUTANEOUS at 08:37

## 2025-04-17 RX ADMIN — SODIUM CHLORIDE 20 ML/HR: 0.9 INJECTION, SOLUTION INTRAVENOUS at 15:30

## 2025-04-17 RX ADMIN — INSULIN LISPRO 4 UNITS: 100 INJECTION, SOLUTION INTRAVENOUS; SUBCUTANEOUS at 18:14

## 2025-04-17 RX ADMIN — DEXAMETHASONE SODIUM PHOSPHATE 4 MG: 4 INJECTION INTRA-ARTICULAR; INTRALESIONAL; INTRAMUSCULAR; INTRAVENOUS; SOFT TISSUE at 06:07

## 2025-04-17 RX ADMIN — INSULIN LISPRO 6 UNITS: 100 INJECTION, SOLUTION INTRAVENOUS; SUBCUTANEOUS at 12:13

## 2025-04-17 RX ADMIN — LORAZEPAM 2 MG: 2 INJECTION INTRAMUSCULAR; INTRAVENOUS at 22:48

## 2025-04-17 RX ADMIN — DIPHENHYDRAMINE HYDROCHLORIDE 25 MG: 50 INJECTION INTRAMUSCULAR; INTRAVENOUS at 16:43

## 2025-04-17 RX ADMIN — DEXAMETHASONE SODIUM PHOSPHATE 4 MG: 4 INJECTION INTRA-ARTICULAR; INTRALESIONAL; INTRAMUSCULAR; INTRAVENOUS; SOFT TISSUE at 18:14

## 2025-04-17 RX ADMIN — LORAZEPAM 0.5 MG: 2 INJECTION INTRAMUSCULAR; INTRAVENOUS at 19:20

## 2025-04-17 RX ADMIN — LORAZEPAM 2 MG: 2 INJECTION INTRAMUSCULAR; INTRAVENOUS at 21:58

## 2025-04-17 RX ADMIN — SODIUM BICARBONATE 150 ML/HR: 84 INJECTION, SOLUTION INTRAVENOUS at 11:41

## 2025-04-17 RX ADMIN — CHLORHEXIDINE GLUCONATE 0.12% ORAL RINSE 15 ML: 1.2 LIQUID ORAL at 21:14

## 2025-04-17 RX ADMIN — DEXAMETHASONE SODIUM PHOSPHATE 4 MG: 4 INJECTION INTRA-ARTICULAR; INTRALESIONAL; INTRAMUSCULAR; INTRAVENOUS; SOFT TISSUE at 12:18

## 2025-04-17 RX ADMIN — ALLOPURINOL 300 MG: 300 TABLET ORAL at 14:39

## 2025-04-17 RX ADMIN — HALOPERIDOL LACTATE 5 MG: 5 INJECTION, SOLUTION INTRAMUSCULAR at 22:47

## 2025-04-17 RX ADMIN — POLYETHYLENE GLYCOL 3350 17 G: 17 POWDER, FOR SOLUTION ORAL at 08:37

## 2025-04-17 NOTE — PROGRESS NOTES
"Progress Note - Palliative Care   Name: Alexis Dennison 64 y.o. male I MRN: [id number]  Unit/Bed#: ICU 03 I Date of Admission: 3/29/2025   Date of Service: 4/17/2025 I Hospital Day: 19     Assessment & Plan  Palliative care encounter  Palliative diagnosis: newly diagnosed CNS lymphoma    Symptom management:  No palliative symptom management needs today.  Will continue to monitor patient and treat accordingly.    Goals:  Level 1 code status  Disease focused care without limits placed  Plan for further imaging (MRI C/T/L/S spine) pending and plan for initiation of high-dose methotrexate + rituximab inpatient    Decisional apparatus:  Patient does not have capacity on exam today.  If capacity is lost, patient's substitute decision maker would default to spouse and adult children by [place] Act 169.  ER contacts:  Elizabeth Chester  Daughter  [phone]  Guy Hitchcock \"Naldo\" Phillip  Spouse  [phone]    Patient also has a son, Drew, and another daughter, Marina, who has down syndrome. Elizabeth is the main contact but family makes decisions as unit.    Advance Directive/Living Will/POLST: none on file    Social support:  Patient support system: spouse Naldo, 3 adult children, extended family  Patient's sister and niece present at bedside yesterday.  Spouse Naldo lives locally and daughter Elizabeth is from [place] but Naldo prefers that she is primary contact due to language barrier (Chinese, unclear  dialect)  Supportive listening provided  Normalized experience of patient/family  Provided anticipatory guidance  Advocated for patient/family with interdisciplinary care team    Coordination of care:  Reviewed case with Dr. Smith, ICU and Marv RN    Follow up  Palliative Care will continue to follow for support and goals of care discussions will be ongoing pending course.  Please reach out via [employer] secure chat if questions or concerns arise.    We appreciate the invitation to be involved in this patient's care.   Brain tumor (HCC)  Admitted 3/29 " with worsening confusion in the setting of recent ED visit with CTA head and neck 3/24 demonstrating multiple nonspecific hyperdense ependymal/subependymal nodules, largest located adjacent to the foramen of Monro (2 x 1.1 x 1 cm ) and f/u outpatient MRI 3/26 demonstrating multiple scattered foci of subependymal nodular enhancement throughout ventricles (left worse than right) with mild hydrocephalus (left worse than right), scattered nodular areas of enhancement in left anterior inferior basal ganglia involving left anterior commissure, and smaller foci of nodular enhancement along anteromedial aspect of left cerebral peduncle and left quirino.with concern for primary CNS malignancy  Per sister, both patient's parents with history of lymphoma  CTH on admission 3/29 stable subependymal nodules and left foramen of Monro region hyperdense lesion, dilation of left lateral ventricle and minimal left to right midline shift  4/3 imaging concerning for worsening hydrocephalus  2 non diagnostic lumbar punctures performed  MRI brain  with interval disease progression   developed worsening mentation and CTH demonstrated predominantly left sided obstructive hydrocephalus from lesion located adjacent to the foramen of Monro, now s/p left front EVD   underwent left frontal endoscopic biopsy and replacement of EVD  Preliminary pathology large B-cell lymphoma  Plan MRI spinal imaging and initiation of high-dose methotrexate + rituximab inpatient    Medical oncology neurosurgery following  Primary CNS lymphoma  Appreciate oncology input  Plan to initiate methotrexate and rituximab today, oncology RN to come to ICU for administration  Encephalopathy  In the setting of the above  On exam, he is drowsy, interactive. Oriented to name only, does not recall , disoriented to all else.  Required ativan overnight for agitation  Pulmonary nodule  CTA 3/29 demonstrates non specific 4 mm right lower lobe pulmonary  nodule  Recommended 3 mo f/u  Liver lesion  CTA 3/29 demonstrates non specific 12 mm hypodense lesion within right hepatic lobe, MRI recommended  MRI 3/30 demonstrates no suspicious hepatic lesions, simple right hepatic lobe cyst noted  Interval history:       Patient was unfortunately agitated overnight, improved following ativan 0.5mg. Remains in b/l mits and restraints but is calm during time of encounter. He appears drowsy but does speak when awakened. He keeps eyes shut throughout conversation. Denies complaints or discomforts.    MEDICATIONS / ALLERGIES:     all current active meds have been reviewed    No Known Allergies    OBJECTIVE:    Physical Exam  Physical Exam  Constitutional:       General: He is not in acute distress.     Appearance: He is ill-appearing.   HENT:      Head:      Comments: Left sided EVD in place. Blood tinged CSF in canister  Eyes:      Comments: Electively keeps eyes shut   Cardiovascular:      Rate and Rhythm: Bradycardia present.   Pulmonary:      Effort: No respiratory distress.   Abdominal:      Tenderness: There is no guarding.   Musculoskeletal:         General: No swelling.   Skin:     General: Skin is warm and dry.   Neurological:      Comments: Oriented to himself and is able to name his wife, but otherwise disoriented to hospital and situation. Wiggles b/l toes to commands   Psychiatric:      Comments: calm         Lab Results:   Results from last 7 days   Lab Units 04/17/25  0621 04/16/25  0511 04/15/25  0530   WBC Thousand/uL 6.88 11.53* 13.12*   HEMOGLOBIN g/dL 13.7 13.3 13.7   HEMATOCRIT % 39.1 36.7 37.4   PLATELETS Thousands/uL 149 145* 172   SEGS PCT % 81* 87* 89*   MONO PCT % 5 4 4   EOS PCT % 0 0 0     Results from last 7 days   Lab Units 04/17/25  0621 04/16/25  0511 04/15/25  0530   POTASSIUM mmol/L 4.1 4.0 4.1   CHLORIDE mmol/L 104 107 105   CO2 mmol/L 26 23 23   BUN mg/dL 23 17 17   CREATININE mg/dL 0.76 0.71 0.85   CALCIUM mg/dL 9.1 8.8 9.1   ALK PHOS U/L  --   42  --    ALT U/L  --  37  --    AST U/L  --  15  --        Imaging Studies: reviewed pertinent studies   EKG, Pathology, and Other Studies: reviewed pertinent studies    Counseling / Coordination of Care    Total floor / unit time spent today 25+ minutes. Greater than 50% of total time was spent with the patient and / or family counseling and / or coordination of care. A description of the counseling / coordination of care: symptom assessment and management, medication review, psychosocial support, imaging review, lab review, advanced directives, supportive listening, anticipatory guidance, and coordination with primary team, RN, daughter via phone.

## 2025-04-17 NOTE — PROGRESS NOTES
Progress Note - Neurosurgery   Name: Alexis Dennison 64 y.o. male I MRN: [id number]  Unit/Bed#: ICU 03 I Date of Admission: 3/29/2025   Date of Service: 2025 I Hospital Day: 19    Assessment & Plan  Brain tumor (HCC)  POD#3 - s/p L frontal endoscopic biopsy of ventricular mass and replacement of EVD  ([place], 25)  Subependymal lesions with large left basal ganglia suspicious for lymphoma.  P/w confusion, short term memory loss.  Patient had multiple nondiagnostic LPs and hematology/oncology was requesting a biopsy to confirm.   - patient had worsening mental status change and CT head demonstrated progressive ventriculomegaly and likely trapped left lateral ventricle, ultimately a left frontal EVD was placed.   - patient self extubated.  Preliminary pathology, small round blue cell tumor; differential Dx includes lymphoma   EVD replaced during surgery on     Imagin/14 CTH: Left frontal approach ventriculostomy catheter terminating near the foramen of Monro with increased left frontal lobe pneumocephalus and gas within the left frontal horn since prior study. Slightly increased small amount of layering intraventricular hemorrhage within the left occipital horn. Improved hydrocephalus primarily of the left lateral ventricle since prior study.Stable hyperdense mass centered within the left basal ganglia with surrounding vasogenic edema and mild rightward midline shift    Plan:   Continue to closely monitor neuro exam   Frequent neuro checks per primary team   Repeat STAT CTH with any acute decline in GCS > 2pts or more in 1hr   Maintain normotensive BP goals, SBP < 160, MAP > 65   L frontal EVD in place (placed on  in OR)   Currently open @ 20   Monitor output and ICP closely, ICP 6-7   Maintain at 20 and open today, tentative plan to clamp tomorrow   Plan for repeat MRI brain w/wo, BT protocol on  to assess response to treatment/if need for VPS   Continue local wound care to incision    Will leave dressing in place given pt will reach up and touch his head   Final pathology: primary CNS non-Hodgkin's lymphoma, diffuse large B-cell lymphoma   Oncology following closely   Recommend completing staging for his lymphoproliferative disease w/ MRI imaging of the cervical, thoracic, and lumbar spine w/wo contrast   Plan is for treatment with the following regimen:   High dose methotrexate 8000 mg/m² followed by leucovorin rescue  rituximab (Rituxan) 375 mg/m² IV   intrathecal methotrexate and cytarabine (ZAYRA-C)   Continue decadron 4mg q 6hrs   Continue keppra 500mg BID x 7 days for seizure ppx   Hold all AC/AP meds   DVT ppx: SCDs, SQH BID   Medical management per primary team   Pain control per primary team   PT/OT   Social work following for assistance with dispo once medically cleared      Neurosurgery will continue to follow, call with any further questions or concerns.    Please contact the SecureChat role for the Neurosurgery service with any questions/concerns.    Subjective   Pt seen and examined this am on rounds.  Patient with issues of agitation overnight requiring bilateral wrist restraints and a Posey belt.  Patient remains pleasantly confused this morning.  He is neurologically stable this a.m.  EVD is functioning well.  ICPs well-controlled overnight.     Objective :  Temp:  [97.4 °F (36.3 °C)-98.4 °F (36.9 °C)] 97.8 °F (36.6 °C)  HR:  [34-54] 36  BP: (112-151)/() 126/72  Resp:  [15-23] 16  SpO2:  [96 %-99 %] 96 %  O2 Device: None (Room air)    I/O         04/15 0701  04/16 0700 04/16 0701  04/17 0700 04/17 0701  04/18 0700    P.O. 630 240     I.V. (mL/kg) 1880 (21.8) 57.4 (0.7)     IV Piggyback 300      Total Intake(mL/kg) 2810 (32.6) 297.4 (3.5)     Urine (mL/kg/hr) 1392 (0.7) 1180 (0.6)     Emesis/NG output       Drains 0 2     Stool 0      Total Output 1392 1182     Net +1418 -884.6            Unmeasured Urine Occurrence 1 x 2 x     Unmeasured Stool Occurrence 2 x             Physical Exam Neurological Exam  General appearance: alert, elderly male, sitting up comfortably in bed and eating breakfast this am, comfortable, no acute distress   Head:   - L frontal EVD in place, serous drainage   - insertion site with clean, dry, intact dressing in place   Eyes: EOMI, PERRL  Neck: supple, symmetrical, trachea midline and NT  Back: no kyphosis present, no tenderness to percussion or palpation  Lungs: non labored breathing, no resp distress on room air   Heart: regular heart rate  Neurologic:   Mental status: Alert, oriented only to self   - pleasantly confused   - following commands throughout, needs some prompting   Cranial nerves: grossly intact (Cranial nerves II-XII)  Sensory: normal to LT omar   Motor: moving all extremities without focal weakness      Lab Results: I have reviewed the following results:  Recent Labs     04/15/25  0530 04/16/25  0511 04/17/25  0621   WBC 13.12* 11.53* 6.88   HGB 13.7 13.3 13.7   HCT 37.4 36.7 39.1    145* 149   SODIUM 138 137 136   K 4.1 4.0 4.1    107 104   CO2 23 23 26   BUN 17 17 23   CREATININE 0.85 0.71 0.76   GLUC 176* 169* 187*   MG 2.2 2.1 2.1   PHOS 3.5 3.0 3.3   AST  --  15  --    ALT  --  37  --    ALB  --  3.7  --    TBILI  --  0.64  --    ALKPHOS  --  42  --    PTT 30  --   --    INR 1.07  --   --      VTE Pharmacologic Prophylaxis: Sequential compression device (Venodyne)  and Heparin

## 2025-04-17 NOTE — QUICK NOTE
Telephone conversation held with Elizabeth Chester (daughter), Dr. Eva Ortiz, and PATI [place] on 4/17/25.  Updated family on planned systemic chemotherapy regimen including initiation of systemic chemotherapy with high-dose methotrexate and rituximab, and intrathecal administration of cytarabine. Family understands rationale for planned treatment regimen, all questions answered.    Christel Nevarez, CHASIDY, MIGNONNP  Palliative and Supportive Care  Clinic/Answering Service: [phone]  You can find me on [employer] Secure Chat

## 2025-04-17 NOTE — ASSESSMENT & PLAN NOTE
Appreciate oncology input  Plan to initiate methotrexate and rituximab today, oncology RN to come to ICU for administration

## 2025-04-17 NOTE — PROGRESS NOTES
Progress Note - Critical Care/ICU   Name: Alexis Dennison 64 y.o. male I MRN: [id number]  Unit/Bed#: ICU 03 I Date of Admission: 3/29/2025   Date of Service: 4/17/2025 I Hospital Day: 19       This is an attestation of Resident note    24 hour events:  EVD 2 cc out, however ICP low 2-5 and drain level at 20   MRI spine pending planning for this evening or tonight, responded well to ativan overnight for sedation  Oncology planning to start chemotherapy  Stopped Precedx      Physical exam:  When I walked into room he was sitting up in bed on low dose precedex with EVD open at 20 cm..  He was sleeping but awoke with me calling his name opened eyes to voice and followed commands in all extremities. Sleepy today but grossly unchanged exam. EOM grossly intact struggles to maintain command following throughout physical exam  SB 30-40s  No respiratory distress on RA  Ab soft  Ext warm and well perfused     Impression:  Hyperdense left basal ganglia with surrounding vasogenic edema with nonspecific 12 mm hypodense right hepatic lobe lesion and nonspecific 4 mm right pulm lower lobe lesion  Obstructive hydrocephalus s/p EVD 4/13  Encephalopathy   Heterogenous nodular enlargement of left thyroid lobe  Delirium   High grade lymphoma awaiting MRI of spine prior to induction of chemotherapy     Plan:  EVD open at 20 cm management per neurosurgery, plan for slow wean  Redirect for delirium   PRN ativan for MRI facilitation   Seroquel PRN for sleep aid  Oncology to start high dose methotrexate pending spinal MRI  Decadron  Keppra 7 day ppx  Glucose control SSI, increase SSI  DVT ppx BID  SLP following, dysphagia diet, cont bowel regimen   Home PPI  Dispo: Remain in ICU while EVD in place      Afternoon update:  Pathology confirmed CNS involvement   Spinal MRI for staging planned for this evening/tonight  Urinalysis showed acidic urine, started on Bicarb for alkalization prior to induction with methotrexate plan for tomorrow (will also  be given intrathecally)

## 2025-04-17 NOTE — ASSESSMENT & PLAN NOTE
CTA 3/29 with nonspecific 12mm hypodense right hepatic lesion, recommended MRI abdomen  MRI 3/30 with no suspicious hepatic lesions, demonstrated simple right hepatic lobe cyst   Patient calling back regarding the message she left yesterday about the forms, please call her today.

## 2025-04-17 NOTE — OCCUPATIONAL THERAPY NOTE
Occupational Therapy Treatment Note      Alexis Juma    4/17/2025    Principal Problem:    Brain tumor (HCC)  Active Problems:    Type 2 diabetes mellitus with hyperglycemia, without long-term current use of insulin (HCC)    HTN, goal below 130/80    Mixed hyperlipidemia    Thyroid nodule    Pulmonary nodule    Liver lesion    Constipation    Ambulatory dysfunction    Encephalopathy    Goals of care, counseling/discussion    Palliative care encounter    Primary CNS lymphoma      Past Medical History:   Diagnosis Date    Colon polyp     Diabetes mellitus (HCC)     GERD (gastroesophageal reflux disease)     Hyperlipidemia     Hypertension     Liver disease     Sleep apnea        Past Surgical History:   Procedure Laterality Date    COLONOSCOPY      CYST REMOVAL      back    FL LUMBAR PUNCTURE DIAGNOSTIC  3/31/2025    FL LUMBAR PUNCTURE DIAGNOSTIC  4/7/2025    KY EXC B9 LESION MRGN XCP SK TG T/A/L 0.6-1.0 CM N/A 6/7/2023    Procedure: EXCISION  BIOPSY LESION/MASS ABDOMINAL/CHEST WALL;  Surgeon: Trevor Villavicencio MD;  Location:  MAIN OR;  Service: General    THIRD VENTRICULOSTOMY Left 4/14/2025    Procedure: Left frontal endoscopic biopsy of ventricular mass and placement of EVD;  Surgeon: Paul Causey MD;  Location:  MAIN OR;  Service: Neurosurgery        04/17/25 1203   OT Last Visit   OT Visit Date 04/17/25   Note Type   Note Type Treatment   Pain Assessment   Pain Assessment Tool 0-10   Pain Score No Pain   Restrictions/Precautions   Weight Bearing Precautions Per Order No   Other Precautions Cognitive;Impulsive;Chair Alarm;Bed Alarm;Restraints;Multiple lines;Telemetry;Fall Risk;Pain  (posey belt, B/L Hand mitts and wrist restraints; EVD)   Lifestyle   Autonomy I with ADL's/iaDL's, no AD with functional mobility +drives   Reciprocal Relationships spouse, children   Service to Others full time employement   Intrinsic Gratification reading   ADL   LB Dressing Assistance 2  Maximal Assistance   LB Dressing  Deficit Don/doff R sock;Don/doff L sock   Bed Mobility   Supine to Sit 3  Moderate assistance   Additional items Assist x 1;Increased time required;Verbal cues;Impulsive;LE management   Sit to Supine Unable to assess   Additional Comments pt sat EOB w/ Min A for sitting balance/trunk control; limited by impulsivity and difficult to redirect.   Transfers   Sit to Stand 3  Moderate assistance   Additional items Assist x 2;Increased time required;Verbal cues;Impulsive   Stand to Sit 3  Moderate assistance   Additional items Assist x 2;Increased time required;Impulsive;Verbal cues   Additional Comments B/L HHA used; VC for safety 2/2 impulsivity   Functional Mobility   Functional Mobility 3  Moderate assistance   Additional Comments Pt took few small steps from EOB to recliner w/ Mod  Ax2; B/L HHA used. VC for safety 2/2 impulsivity. Needs MAX VC for bending at waist to sit in recliner.   Additional items Hand hold assistance   Cognition   Overall Cognitive Status (S)  Impaired   Arousal/Participation Responsive;Cooperative   Attention Difficulty attending to directions   Orientation Level Oriented to person;Disoriented to place;Disoriented to time;Disoriented to situation   Memory Decreased recall of recent events;Decreased short term memory;Decreased recall of biographical information;Decreased recall of precautions   Following Commands Follows one step commands inconsistently   Comments pt is cooperative; presenting w/ global aphasia; word salad/nonsensical speech. very impulsive; has poor safety awareness and understanding of deficits. has decreased body awareness and L/R discrimination. needs redirection to task frequently.   Activity Tolerance   Activity Tolerance Patient limited by fatigue;Other (Comment)  (cognition)   Medical Staff Made Aware PT, RN   Assessment   Assessment Patient participated in Skilled OT session 4/17/2025 with interventions consisting of ADL re training with the use of correct body  mechnaics, Energy Conservation techniques, safety awareness and fall prevention techniques, therapeutic exercise to: increase functional use of BUEs, increase BUE muscle strength ,  therapeutic activities to: increase activity tolerance, increase standing tolerance time with unilateral UE support to complete sink level ADLs, increase dynamic sit/ stand balance during functional activity , increase postural control, increase trunk control, and increase OOB/ sitting tolerance . Patient agreeable to OT treatment session, upon arrival patient was found supine in bed.  In comparison to previous session, patient with improvements in EOB sitting tolerance . Patient requiring frequent re direction, verbal cues for safety, verbal cues for correct technique, verbal cues for pacing thru activity steps, and frequent rest periods. Patient continues to be functioning below baseline level, occupational performance remains limited secondary to factors listed above and increased risk for falls and injury.   From OT standpoint, recommendation at time of d/c would be maximum resource intensity.   Patient to benefit from continued Occupational Therapy treatment while in the hospital to address deficits as defined above and maximize level of functional independence with ADLs and functional mobility.   Plan   Treatment Interventions ADL retraining;Visual perceptual retraining;Functional transfer training;UE strengthening/ROM;Endurance training;Cognitive reorientation;Patient/family training;Equipment evaluation/education;Fine motor coordination activities;Compensatory technique education;Continued evaluation;Energy conservation;Activityengagement   Goal Expiration Date 04/29/25   OT Treatment Day 3   OT Frequency 3-5x/wk   Discharge Recommendation   Recommendation Physiatry Consult   Rehab Resource Intensity Level, OT I (Maximum Resource Intensity)   Additional Comments  The patient's raw score on the AM-PAC Daily Activity Inpatient  Short Form is 9. A raw score of less than 19 suggests the patient may benefit from discharge to post-acute rehabilitation services. Please refer to the recommendation of the Occupational Therapist for safe discharge planning.   Additional Comments 2 Pt seen as a co-session due to the patient's co-morbidities, clinically unstable presentation, and present impairments which are a regression from the patient's baseline.   AM-PAC Daily Activity Inpatient   Lower Body Dressing 2   Bathing 2   Toileting 2   Upper Body Dressing 1   Grooming 1   Eating 1   Daily Activity Raw Score 9   Turning Head Towards Sound 2   Follow Simple Instructions 2   Low Function Daily Activity Raw Score 13   Low Function Daily Activity Standardized Score  23.16   AM-PAC Applied Cognition Inpatient   Following a Speech/Presentation 1   Understanding Ordinary Conversation 2   Taking Medications 1   Remembering Where Things Are Placed or Put Away 2   Remembering List of 4-5 Errands 1   Taking Care of Complicated Tasks 1   Applied Cognition Raw Score 8   Applied Cognition Standardized Score 19.32   End of Consult   Education Provided Yes   Patient Position at End of Consult Bedside chair;Bed/Chair alarm activated;All needs within reach   Nurse Communication Nurse aware of consult       Thuy Jolley MS, OTR/L

## 2025-04-17 NOTE — ASSESSMENT & PLAN NOTE
Admitted 3/29 with worsening confusion since prior ED presentation and outpatient imaging  Seen at [facility] ED on 3/24 with 3 week history of altered mental status, memory difficulty  CTH 3/24 demonstrated multiple hyperdense ependyma/subependymal nodules  MRI brain 3/26 performed outpatient demonstrating multiple scattered foci of subependymal nodular enhancement with mild hydrocephalus, scattered nodular areas of enhancement in left anterior inferior basal ganglia dn foci of nodular enhancement along anteromedial aspect of left cerebral peduncle and left quirino  CTH 3/29 (this admission)  Stable subependymal nodules and left formamen of De Oliveira region hyperdense lesion with dilation of left lateral ventrical and minimal left to right midline shift  S/p LP 3/31  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   MRI Brain (4/11) showed progression of disease from 3/30 to 4/11 with interval enlargement of the dominant left anterior basal ganglionic mass with greater surrounding vasogenic edema and mass effect and persistent leptomeningeal and intraventricular seeding with obstructive hydrocephalus--current differential lymphoma versus high-grade glioma less likely metastasis  4/13: continued deterioration in mental status, CT Head 4/13 demonstrated left sided obstructive hydrocephalus from lesion located adjacent to formamen of Monro, similar to CT on 4/3; blood products within occiptial horn of left lateral ventricle similar to MRI 4/11; left anterior basal ganglia mass with surrounding edema,  regional mass effect and effacement of left suprasellar cistern, similar to MRI 4/11.  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)  Ophthalmology ambulatory referral placed for discharge (no slit-lamp exam available inpatient)  Discussed with radiation oncology - no indication for radiation at this time  Patient has been persistently bradycardia, asymptomatic at this time, will continue to be monitored in ICU at this time as EVD remains in place    Plan:  MRI C/T/L/S for evaluation of spinal involvement - has not gone for scan yet, planned for 4/17  Initiation of chemotherapy with high-dose methotrexate and rituximab while inpatient - tentatively on 4/17  Daily lab monitoring for TLS - CBC, CMP, Phos, LDH, Uric acid  Pathology reported on afternoon of 4/16 that CSF is involved - will add intrathecal methotrexate and cytarabine  Will have updated conversation with family for consent

## 2025-04-17 NOTE — PLAN OF CARE
Problem: OCCUPATIONAL THERAPY ADULT  Goal: Performs self-care activities at highest level of function for planned discharge setting.  See evaluation for individualized goals.  Description: Treatment Interventions: ADL retraining, Visual perceptual retraining, Functional transfer training, UE strengthening/ROM, Endurance training, Cognitive reorientation, Patient/family training, Equipment evaluation/education, Fine motor coordination activities, Compensatory technique education, Continued evaluation, Energy conservation, Activityengagement  Equipment Recommended:  (tbd)       See flowsheet documentation for full assessment, interventions and recommendations.   Outcome: Progressing   Patient participated in Skilled OT session 4/17/2025 with interventions consisting of ADL re training with the use of correct body mechnaics, Energy Conservation techniques, safety awareness and fall prevention techniques, therapeutic exercise to: increase functional use of BUEs, increase BUE muscle strength ,  therapeutic activities to: increase activity tolerance, increase standing tolerance time with unilateral UE support to complete sink level ADLs, increase dynamic sit/ stand balance during functional activity , increase postural control, increase trunk control, and increase OOB/ sitting tolerance . Patient agreeable to OT treatment session, upon arrival patient was found supine in bed.  In comparison to previous session, patient with improvements in EOB sitting tolerance . Patient requiring frequent re direction, verbal cues for safety, verbal cues for correct technique, verbal cues for pacing thru activity steps, and frequent rest periods. Patient continues to be functioning below baseline level, occupational performance remains limited secondary to factors listed above and increased risk for falls and injury.   From OT standpoint, recommendation at time of d/c would be maximum resource intensity.   Patient to benefit from  continued Occupational Therapy treatment while in the hospital to address deficits as defined above and maximize level of functional independence with ADLs and functional mobility.     Thuy Jolley MS, OTR/L

## 2025-04-17 NOTE — PROGRESS NOTES
Progress Note - Oncology-Medical   Name: Alexis Dennison 64 y.o. male I MRN: [id number]  Unit/Bed#: ICU 03 I Date of Admission: 3/29/2025   Date of Service: 4/17/2025 I Hospital Day: 19    Assessment & Plan  Brain tumor (HCC)  Admitted 3/29 with worsening confusion since prior ED presentation and outpatient imaging  Seen at [facility] ED on 3/24 with 3 week history of altered mental status, memory difficulty  CTH 3/24 demonstrated multiple hyperdense ependyma/subependymal nodules  MRI brain 3/26 performed outpatient demonstrating multiple scattered foci of subependymal nodular enhancement with mild hydrocephalus, scattered nodular areas of enhancement in left anterior inferior basal ganglia dn foci of nodular enhancement along anteromedial aspect of left cerebral peduncle and left quirino  CTH 3/29 (this admission)  Stable subependymal nodules and left formamen of De Oliveira region hyperdense lesion with dilation of left lateral ventrical and minimal left to right midline shift  S/p LP 3/31  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   MRI Brain (4/11) showed progression of disease from 3/30 to 4/11 with interval enlargement of the dominant left anterior basal ganglionic mass with greater surrounding vasogenic edema and mass effect and persistent leptomeningeal and intraventricular seeding with obstructive hydrocephalus--current differential lymphoma versus high-grade glioma less likely metastasis  4/13: continued deterioration in mental status, CT Head 4/13 demonstrated left sided obstructive  hydrocephalus from lesion located adjacent to formamen of Monro, similar to CT on 4/3; blood products within occiptial horn of left lateral ventricle similar to MRI 4/11; left anterior basal ganglia mass with surrounding edema, regional mass effect and effacement of left suprasellar cistern, similar to MRI 4/11.  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)  Ophthalmology ambulatory referral placed for discharge (no slit-lamp exam available inpatient)  Discussed with radiation oncology - no indication for radiation at this time  Patient has been persistently bradycardia, asymptomatic at this time, will continue to be monitored in ICU at this time as EVD remains in place    Plan:  MRI C/T/L/S for evaluation of spinal involvement - has not gone for scan yet, planned for 4/17  Initiation of chemotherapy with high-dose methotrexate and rituximab while inpatient - tentatively on 4/17  Daily lab monitoring for TLS - CBC, CMP, Phos, LDH, Uric acid  Pathology reported on afternoon of 4/16 that CSF is involved - will add intrathecal methotrexate and cytarabine  Will have updated conversation with family for consent    Encephalopathy  Most likely d/t brain mass, see interim A/P above  Pulmonary nodule  CTA 3/29 with nonspecific 4 mm RLL pulm nodule, recommendation for 3 month follow up  Liver lesion  CTA 3/29 with nonspecific 12mm hypodense right hepatic lesion, recommended MRI abdomen  MRI 3/30 with no suspicious hepatic lesions, demonstrated simple right hepatic lobe cyst  Goals of care, counseling/discussion    Palliative care encounter    Primary CNS  lymphoma      Subjective   NAEON, lying in bed in NAD, on 0.2 mcg/kg/hr. Eyes closed initially but easily aroused with verbal     Objective :  Temp:  [97.4 °F (36.3 °C)-98.4 °F (36.9 °C)] 97.8 °F (36.6 °C)  HR:  [34-54] 36  BP: (112-151)/() 126/72  Resp:  [15-23] 16  SpO2:  [96 %-99 %] 96 %  O2 Device: None (Room air)      Physical Exam  Vitals reviewed.   Constitutional:       General: He is awake. He is not in acute distress.     Interventions: He is sedated and restrained.   HENT:      Head: Normocephalic.      Comments: EVD in place  Cardiovascular:      Rate and Rhythm: Regular rhythm. Bradycardia present.      Heart sounds: Normal heart sounds.   Pulmonary:      Effort: Pulmonary effort is normal. No respiratory distress.      Breath sounds: Normal breath sounds.   Abdominal:      General: There is no distension.   Musculoskeletal:      Right lower leg: No edema.      Left lower leg: No edema.   Skin:     General: Skin is warm and dry.   Neurological:      Mental Status: He is disoriented.         Lab Results: I have reviewed the following results:CBC/BMP:   .     04/17/25  0621   WBC 6.88   HGB 13.7   HCT 39.1      SODIUM 136   K 4.1      CO2 26   BUN 23   CREATININE 0.76   GLUC 187*   MG 2.1   PHOS 3.3    , LFTs:   .     04/17/25  0621   AST 11*   ALT 33   ALB 3.7   TBILI 0.50   ALKPHOS 44      Lab Results   Component Value Date    K 4.1 04/17/2025     04/17/2025    CO2 26 04/17/2025    BUN 23 04/17/2025    CREATININE 0.76 04/17/2025    GLUF 131 (H) 02/22/2025    CALCIUM 9.1 04/17/2025    AST 15 04/16/2025    ALT 37 04/16/2025    ALKPHOS 42 04/16/2025    EGFR 95 04/17/2025     Lab Results   Component Value Date    WBC 6.88 04/17/2025    HGB 13.7 04/17/2025    HCT 39.1 04/17/2025    MCV 90 04/17/2025     04/17/2025     Lab Results   Component Value Date    NEUTROABS 5.54 04/17/2025       Imaging Results Review: No pertinent imaging studies reviewed.  Other Study Results Review:  No additional pertinent studies reviewed.    Administrative Statements   I have spent a total time of 30 minutes in caring for this patient on the day of the visit/encounter including Patient and family education, Counseling / Coordination of care, Documenting in the medical record, Reviewing/placing orders in the medical record (including tests, medications, and/or procedures), and Communicating with other healthcare professionals .

## 2025-04-17 NOTE — ASSESSMENT & PLAN NOTE
POD#3 - s/p L frontal endoscopic biopsy of ventricular mass and replacement of EVD  (Causey, 25)  Subependymal lesions with large left basal ganglia suspicious for lymphoma.  P/w confusion, short term memory loss.  Patient had multiple nondiagnostic LPs and hematology/oncology was requesting a biopsy to confirm.   - patient had worsening mental status change and CT head demonstrated progressive ventriculomegaly and likely trapped left lateral ventricle, ultimately a left frontal EVD was placed.   - patient self extubated.  Preliminary pathology, small round blue cell tumor; differential Dx includes lymphoma   EVD replaced during surgery on     Imagin/14 CTH: Left frontal approach ventriculostomy catheter terminating near the foramen of Monro with increased left frontal lobe pneumocephalus and gas within the left frontal horn since prior study. Slightly increased small amount of layering intraventricular hemorrhage within the left occipital horn. Improved hydrocephalus primarily of the left lateral ventricle since prior study.Stable hyperdense mass centered within the left basal ganglia with surrounding vasogenic edema and mild rightward midline shift    Plan:   Continue to closely monitor neuro exam   Frequent neuro checks per primary team   Repeat STAT CTH with any acute decline in GCS > 2pts or more in 1hr   Maintain normotensive BP goals, SBP < 160, MAP > 65   L frontal EVD in place (placed on  in OR)   Currently open @ 20   Monitor output and ICP closely, ICP 6-7   Maintain at 20 and open today, tentative plan to clamp tomorrow   Plan for repeat MRI brain w/wo, BT protocol on  to assess response to treatment/if need for VPS   Continue local wound care to incision   Will leave dressing in place given pt will reach up and touch his head   Final pathology: primary CNS non-Hodgkin's lymphoma, diffuse large B-cell lymphoma   Oncology following closely   Recommend completing staging for  his lymphoproliferative disease w/ MRI imaging of the cervical, thoracic, and lumbar spine w/wo contrast   Plan is for treatment with the following regimen:   High dose methotrexate 8000 mg/m² followed by leucovorin rescue  rituximab (Rituxan) 375 mg/m² IV   intrathecal methotrexate and cytarabine (ZAYRA-C)   Continue decadron 4mg q 6hrs   Continue keppra 500mg BID x 7 days for seizure ppx   Hold all AC/AP meds   DVT ppx: SCDs, SQH BID   Medical management per primary team   Pain control per primary team   PT/OT   Social work following for assistance with dispo once medically cleared      Neurosurgery will continue to follow, call with any further questions or concerns.

## 2025-04-17 NOTE — PLAN OF CARE
Problem: PAIN - ADULT  Goal: Verbalizes/displays adequate comfort level or baseline comfort level  Description: Interventions:- Encourage patient to monitor pain and request assistance- Assess pain using appropriate pain scale- Administer analgesics based on type and severity of pain and evaluate response- Implement non-pharmacological measures as appropriate and evaluate response- Notify physician/advanced practitioner if interventions unsuccessful or patient reports new pain  Outcome: Progressing     Problem: INFECTION - ADULT  Goal: Absence or prevention of progression during hospitalization  Description: INTERVENTIONS:- Assess and monitor for signs and symptoms of infection- Monitor lab/diagnostic results- Monitor all insertion sites, i.e. indwelling lines, tubes, and drains- [place] appropriate cooling/warming therapies per order- Administer medications as ordered- Instruct and encourage patient and family to use good hand hygiene technique- Identify and instruct in appropriate isolation precautions for identified infection/condition  Outcome: Progressing     Problem: SAFETY ADULT  Goal: Patient will remain free of falls  Description: INTERVENTIONS:- Educate patient/family on patient safety including physical limitations- Instruct patient to call for assistance with activity - Consult OT/PT to assist with strengthening/mobility - Keep Call bell within reach- Keep bed low and locked with side rails adjusted as appropriate- Keep care items and personal belongings within reach- Initiate and maintain comfort rounds- Make Fall Risk Sign visible to staff- Offer Toileting every 2 Hours, in advance of need- Initiate/Maintain bed/chair alarm- Obtain necessary fall risk management equipment.- Apply yellow socks and bracelet for high fall risk patients- Consider moving patient to room near nurses station  Outcome: Progressing  Goal: Maintain or return to baseline ADL function  Description: INTERVENTIONS:-  Assess  patient's ability to carry out ADLs; assess patient's baseline for ADL function and identify physical deficits which impact ability to perform ADLs (bathing, care of mouth/teeth, toileting, grooming, dressing, etc.)- Assess/evaluate cause of self-care deficits - Assess range of motion- Assess patient's mobility; develop plan if impaired- Assess patient's need for assistive devices and provide as appropriate- Encourage maximum independence but intervene and supervise when necessary- Involve family in performance of ADLs- Assess for home care needs following discharge - Consider OT consult to assist with ADL evaluation and planning for discharge- Provide patient education as appropriate  Outcome: Progressing  Goal: Maintains/Returns to pre admission functional level  Description: INTERVENTIONS:- Perform AM-PAC 6 Click Basic Mobility/ Daily Activity assessment daily.- Set and communicate daily mobility goal to care team and patient/family/caregiver. - Collaborate with rehabilitation services on mobility goals if consulted- Reposition patient every 2 hours.- Dangle patient 3 times a day- Stand patient 3 times a day- Ambulate patient 3 times a day- Out of bed to chair 3 times a day - Out of bed for meals 3 times a day- Out of bed for toileting- Record patient progress and toleration of activity level   Outcome: Progressing     Problem: DISCHARGE PLANNING  Goal: Discharge to home or other facility with appropriate resources  Description: INTERVENTIONS:- Identify barriers to discharge w/patient and caregiver- Arrange for needed discharge resources and transportation as appropriate- Identify discharge learning needs (meds, wound care, etc.)- Refer to Case Management Department for coordinating discharge planning if the patient needs post-hospital services based on physician/advanced practitioner order or complex needs related to functional status, cognitive ability, or social support system  Outcome: Progressing      Problem: Knowledge Deficit  Goal: Patient/family/caregiver demonstrates understanding of disease process, treatment plan, medications, and discharge instructions  Description: Complete learning assessment and assess knowledge base.Interventions:- Provide teaching at level of understanding- Provide teaching via preferred learning methods  Outcome: Progressing     Problem: NEUROSENSORY - ADULT  Goal: Achieves stable or improved neurological status  Description: INTERVENTIONS- Monitor and report changes in neurological status- Monitor vital signs such as temperature, blood pressure, glucose, and any other labs ordered - Initiate measures to prevent increased intracranial pressure- Monitor for seizure activity and implement precautions if appropriate    Outcome: Progressing  Goal: Remains free of injury related to seizures activity  Description: INTERVENTIONS- Maintain airway, patient safety  and administer oxygen as ordered- Monitor patient for seizure activity, document and report duration and description of seizure to physician/advanced practitioner- If seizure occurs,  ensure patient safety during seizure- Reorient patient post seizure- Seizure pads on all 4 side rails- Instruct patient/family to notify RN of any seizure activity including if an aura is experienced- Instruct patient/family to call for assistance with activity based on nursing assessment- Administer anti-seizure medications if ordered  Outcome: Progressing  Goal: Achieves maximal functionality and self care  Description: INTERVENTIONS- Monitor swallowing and airway patency with patient fatigue and changes in neurological status- Encourage and assist patient to increase activity and self care. - Encourage visually impaired, hearing impaired and aphasic patients to use assistive/communication devices  Outcome: Progressing     Problem: METABOLIC, FLUID AND ELECTROLYTES - ADULT  Goal: Glucose maintained within target range  Description: INTERVENTIONS:-  Monitor Blood Glucose as ordered- Assess for signs and symptoms of hyperglycemia and hypoglycemia- Administer ordered medications to maintain glucose within target range- Assess nutritional intake and initiate nutrition service referral as needed  Outcome: Progressing     Problem: Prexisting or High Potential for Compromised Skin Integrity  Goal: Skin integrity is maintained or improved  Description: INTERVENTIONS:- Identify patients at risk for skin breakdown- Assess and monitor skin integrity- Assess and monitor nutrition and hydration status- Monitor labs - Assess for incontinence - Turn and reposition patient- Assist with mobility/ambulation- Relieve pressure over bony prominences- Avoid friction and shearing- Provide appropriate hygiene as needed including keeping skin clean and dry- Evaluate need for skin moisturizer/barrier cream- Collaborate with interdisciplinary team - Patient/family teaching- Consider wound care consult   Outcome: Progressing     Problem: SAFETY,RESTRAINT: NV/NON-SELF DESTRUCTIVE BEHAVIOR  Goal: Remains free of harm/injury (restraint for non violent/non self-detsructive behavior)  Description: INTERVENTIONS:- Instruct patient/family regarding restraint use - Assess and monitor physiologic and psychological status - Provide interventions and comfort measures to meet assessed patient needs - Identify and implement measures to help patient regain control- Assess readiness for release of restraint   Outcome: Progressing  Goal: Returns to optimal restraint-free functioning  Description: INTERVENTIONS:- Assess the patient's behavior and symptoms that indicate continued need for restraint- Identify and implement measures to help patient regain control- Assess readiness for release of restraint   Outcome: Progressing

## 2025-04-17 NOTE — ASSESSMENT & PLAN NOTE
In the setting of the above  On exam, he is drowsy, interactive. Oriented to name only, does not recall , disoriented to all else.  Required ativan overnight for agitation

## 2025-04-17 NOTE — SOCIAL WORK
Palliative Assessment Note - Inpatient     Palliative LSW saw patient at bedside today. LSW appreciates the opportunity to provide Patient with emotional support and guidance while Patient continues to receive supportive care from the Palliative Team.    LSW completed an assessment with Patient     Relationship status:   Duration of relationship:   Name of significant other: Naldo  Children and Ages: Adult Daughter-Elizabeth  Pets:    Other important family information: Pt's spouse's first language is chinese (dialect unknown) with limited use of English words.     Living situation (place and with whom): Pt lives with his wife in a 2 story home w/ 3 eber.     Patient's primary caregiver:  Self at baseline. At this time Pt is not able to care for himself, he will require assistance with ADLs, IADLs.     Any limitations: Weakness, Pain, altered mentation (intermittent forgetfulness and confusion).     Environmental concerns or barriers: Bedroom w/ Full Bath is on 2nd flr w/ flight of stairs to access. There is a half bathroom on the main floor.      history:None    Employment history/ Source of income: Employed.     Disability:Anticipate need to apply for SSI/SSD v early intermediate.     Concerns regarding literacy: None  Spirituality/ Sabianism:    Cultural information:     Mental Health and/or Drug and Alcohol history: None    Advanced Directives: No formal documents. Daughter is currently acting as Pt's HCR, per Patient, as his spouse speaks only chinese.    Patient's strengths: LSW met with Pt in the hospital room, where he presented with Sx of anxiety, limited ability to verbalise clearly, limited articulation.   Despite the observed limitations, Pt exhibited determination to be present with LSW, and communicate to the best of his ability.     Social supports: Spouse, Daughter    Resources: STD income? Medical insurance, Medical Teams    Patient current level of coping:  Complex. Pt is living with an  advancing illness that is having a challenging impact on his work and personal life, and on his family.    Level of understanding: Unknown. Pt is aware his is not well. It is unclear to LSW if he is able to fully understand his Dx and prognosis.     Patient concerns and areas of need: Pt is most concerned about his wife and his personal responsibility for her. Pt and his family will need supportive care in the community and in their home as Pt's disease progresses.      I have spent  30  minutes with  Patient   today in which greater than 50% of this time was spent completing a SW assessment, identifying needs, concerns and goals, offering counseling and care coordination while providing supportive listening, emotional support, opportunity for self determination, dignity, normalisation and validation.  Additional tools: Documenting in the medical record and Communicating with other healthcare professionals .     Palliative SW remains available to provide ongoing support and care coordination as needed and/or requested.

## 2025-04-17 NOTE — ASSESSMENT & PLAN NOTE
"Palliative diagnosis: newly diagnosed CNS lymphoma    Symptom management:  No palliative symptom management needs today.  Will continue to monitor patient and treat accordingly.    Goals:  Level 1 code status  Disease focused care without limits placed  Plan for further imaging (MRI C/T/L/S spine) pending and plan for initiation of high-dose methotrexate + rituximab inpatient    Decisional apparatus:  Patient does not have capacity on exam today.  If capacity is lost, patient's substitute decision maker would default to spouse and adult children by [place] 169.  ER contacts:  Elizabeth Chester  Daughter  [phone]  Guy Hitchcock \"Naldo\" Phillip  Spouse  [phone]    Patient also has a son, Drew, and another daughter, Marina, who has down syndrome. Elizabeth is the main contact but family makes decisions as unit.    Advance Directive/Living Will/POLST: none on file    Social support:  Patient support system: spouse Naldo, 3 adult children, extended family  Patient's sister and niece present at bedside yesterday.  Spouse Naldo lives locally and daughter Elizabeth is from [place] but Naldo prefers that she is primary contact due to language barrier (Chinese, unclear  dialect)  Supportive listening provided  Normalized experience of patient/family  Provided anticipatory guidance  Advocated for patient/family with interdisciplinary care team    Coordination of care:  Reviewed case with Dr. Smith, ICU and Marv RN    Follow up  Palliative Care will continue to follow for support and goals of care discussions will be ongoing pending course.  Please reach out via [employer] secure chat if questions or concerns arise.    We appreciate the invitation to be involved in this patient's care.   "

## 2025-04-17 NOTE — PLAN OF CARE
Problem: PAIN - ADULT  Goal: Verbalizes/displays adequate comfort level or baseline comfort level  Description: Interventions:- Encourage patient to monitor pain and request assistance- Assess pain using appropriate pain scale- Administer analgesics based on type and severity of pain and evaluate response- Implement non-pharmacological measures as appropriate and evaluate response- Notify physician/advanced practitioner if interventions unsuccessful or patient reports new pain  Outcome: Progressing     Problem: INFECTION - ADULT  Goal: Absence or prevention of progression during hospitalization  Description: INTERVENTIONS:- Assess and monitor for signs and symptoms of infection- Monitor lab/diagnostic results- Monitor all insertion sites, i.e. indwelling lines, tubes, and drains- [place] appropriate cooling/warming therapies per order- Administer medications as ordered- Instruct and encourage patient and family to use good hand hygiene technique- Identify and instruct in appropriate isolation precautions for identified infection/condition  Outcome: Progressing     Problem: SAFETY ADULT  Goal: Patient will remain free of falls  Description: INTERVENTIONS:- Educate patient/family on patient safety including physical limitations- Instruct patient to call for assistance with activity - Consult OT/PT to assist with strengthening/mobility - Keep Call bell within reach- Keep bed low and locked with side rails adjusted as appropriate- Keep care items and personal belongings within reach- Initiate and maintain comfort rounds- Make Fall Risk Sign visible to staff- Offer Toileting every 2 Hours, in advance of need- Initiate/Maintain bed/chair alarm- Obtain necessary fall risk management equipment.- Apply yellow socks and bracelet for high fall risk patients- Consider moving patient to room near nurses station  Outcome: Progressing  Goal: Maintain or return to baseline ADL function  Description: INTERVENTIONS:-  Assess  patient's ability to carry out ADLs; assess patient's baseline for ADL function and identify physical deficits which impact ability to perform ADLs (bathing, care of mouth/teeth, toileting, grooming, dressing, etc.)- Assess/evaluate cause of self-care deficits - Assess range of motion- Assess patient's mobility; develop plan if impaired- Assess patient's need for assistive devices and provide as appropriate- Encourage maximum independence but intervene and supervise when necessary- Involve family in performance of ADLs- Assess for home care needs following discharge - Consider OT consult to assist with ADL evaluation and planning for discharge- Provide patient education as appropriate  Outcome: Progressing  Goal: Maintains/Returns to pre admission functional level  Description: INTERVENTIONS:- Perform AM-PAC 6 Click Basic Mobility/ Daily Activity assessment daily.- Set and communicate daily mobility goal to care team and patient/family/caregiver. - Collaborate with rehabilitation services on mobility goals if consulted- Reposition patient every 2 hours.- Dangle patient 3 times a day- Stand patient 3 times a day- Ambulate patient 3 times a day- Out of bed to chair 3 times a day - Out of bed for meals 3 times a day- Out of bed for toileting- Record patient progress and toleration of activity level   Outcome: Progressing     Problem: DISCHARGE PLANNING  Goal: Discharge to home or other facility with appropriate resources  Description: INTERVENTIONS:- Identify barriers to discharge w/patient and caregiver- Arrange for needed discharge resources and transportation as appropriate- Identify discharge learning needs (meds, wound care, etc.)- Refer to Case Management Department for coordinating discharge planning if the patient needs post-hospital services based on physician/advanced practitioner order or complex needs related to functional status, cognitive ability, or social support system  Outcome: Progressing      Problem: Knowledge Deficit  Goal: Patient/family/caregiver demonstrates understanding of disease process, treatment plan, medications, and discharge instructions  Description: Complete learning assessment and assess knowledge base.Interventions:- Provide teaching at level of understanding- Provide teaching via preferred learning methods  Outcome: Progressing     Problem: NEUROSENSORY - ADULT  Goal: Achieves stable or improved neurological status  Description: INTERVENTIONS- Monitor and report changes in neurological status- Monitor vital signs such as temperature, blood pressure, glucose, and any other labs ordered - Initiate measures to prevent increased intracranial pressure- Monitor for seizure activity and implement precautions if appropriate    Outcome: Progressing  Goal: Remains free of injury related to seizures activity  Description: INTERVENTIONS- Maintain airway, patient safety  and administer oxygen as ordered- Monitor patient for seizure activity, document and report duration and description of seizure to physician/advanced practitioner- If seizure occurs,  ensure patient safety during seizure- Reorient patient post seizure- Seizure pads on all 4 side rails- Instruct patient/family to notify RN of any seizure activity including if an aura is experienced- Instruct patient/family to call for assistance with activity based on nursing assessment- Administer anti-seizure medications if ordered  Outcome: Progressing  Goal: Achieves maximal functionality and self care  Description: INTERVENTIONS- Monitor swallowing and airway patency with patient fatigue and changes in neurological status- Encourage and assist patient to increase activity and self care. - Encourage visually impaired, hearing impaired and aphasic patients to use assistive/communication devices  Outcome: Progressing     Problem: METABOLIC, FLUID AND ELECTROLYTES - ADULT  Goal: Glucose maintained within target range  Description: INTERVENTIONS:-  Monitor Blood Glucose as ordered- Assess for signs and symptoms of hyperglycemia and hypoglycemia- Administer ordered medications to maintain glucose within target range- Assess nutritional intake and initiate nutrition service referral as needed  Outcome: Progressing     Problem: Prexisting or High Potential for Compromised Skin Integrity  Goal: Skin integrity is maintained or improved  Description: INTERVENTIONS:- Identify patients at risk for skin breakdown- Assess and monitor skin integrity- Assess and monitor nutrition and hydration status- Monitor labs - Assess for incontinence - Turn and reposition patient- Assist with mobility/ambulation- Relieve pressure over bony prominences- Avoid friction and shearing- Provide appropriate hygiene as needed including keeping skin clean and dry- Evaluate need for skin moisturizer/barrier cream- Collaborate with interdisciplinary team - Patient/family teaching- Consider wound care consult   Outcome: Progressing     Problem: SAFETY,RESTRAINT: NV/NON-SELF DESTRUCTIVE BEHAVIOR  Goal: Remains free of harm/injury (restraint for non violent/non self-detsructive behavior)  Description: INTERVENTIONS:- Instruct patient/family regarding restraint use - Assess and monitor physiologic and psychological status - Provide interventions and comfort measures to meet assessed patient needs - Identify and implement measures to help patient regain control- Assess readiness for release of restraint   Outcome: Progressing  Goal: Returns to optimal restraint-free functioning  Description: INTERVENTIONS:- Assess the patient's behavior and symptoms that indicate continued need for restraint- Identify and implement measures to help patient regain control- Assess readiness for release of restraint   Outcome: Progressing

## 2025-04-17 NOTE — PROGRESS NOTES
Progress Note - Critical Care/ICU   Name: Alexis Dennison 64 y.o. male I MRN: [id number]  Unit/Bed#: ICU 03 I Date of Admission: 3/29/2025   Date of Service: 4/17/2025 I Hospital Day: 19       Assessment & Plan   Active Hospital Problems    Diagnosis Date Noted POA    Brain tumor (HCC) 03/29/2025 Yes    Goals of care, counseling/discussion 04/16/2025 Not Applicable    Palliative care encounter 04/16/2025 Not Applicable    Primary CNS lymphoma 04/16/2025 Unknown    Encephalopathy 04/02/2025 Yes    Constipation 04/01/2025 No    Ambulatory dysfunction 04/01/2025 Yes    Thyroid nodule 03/30/2025 Yes    Pulmonary nodule 03/30/2025 Yes    Liver lesion 03/30/2025 Yes    Type 2 diabetes mellitus with hyperglycemia, without long-term current use of insulin (HCC) 06/14/2022 Yes     Chronic    Mixed hyperlipidemia 06/14/2022 Yes     Chronic    HTN, goal below 130/80 06/14/2022 Yes     Chronic      Resolved Hospital Problems    Diagnosis Date Noted Date Resolved POA    Foot erythema 04/12/2025 04/14/2025 Unknown    Abnormal CT scan 03/29/2025 03/30/2025 Yes     Neuro:   Diagnoses: L anterior basal ganglia mass with surrounding edema s/p biopsy 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  4/13 CT head: largely unchanged left sided obstructive hydrocephalus, blood products within the occipital horn of the left lateral ventricle is similar from the MRI brain 4/11. Left anterior basal ganglia mass with surrounding edema, regional mass effect, and effacement of the left suprasellar cistern, appears similar to MRI of the brain 4/11/2025  S/P Bedside EVD placement for obstructive hydrocephalus on 4/13  S/P Biopsy of  brain mass via Neurosurgery and replaced EVD  4/14 CT H: EVD in place, improved hydrocephalus, mass with vasogenic edema stable    PLAN:   Neuro surgery following, appreciate reccs  EVD monitoring at 15, will wean according to Neurosurgery  Neuro checks every 2 hours  Decadron q6hr, Keppra BID  MRI Brain Sunday, possible  shunt next week, will defer to Neurosurgery  MRI Spine per Oncology to rule out spinal masses in setting of CNS Large B cell lymphoma, see below in Heme/Onc  Repeat CT head with change in GCS > 2 points in 1 hour  Daily CAM-ICU, delirium precautions. Regulate sleep/wake cycle.       CV:  Diagnoses: Asymptomatic Bradycardia, Hx of HTN, HLD  PLAN:   Home regimen: Hyzaar 100-25mg   Hold losartan/HCTZ  Continue statin  Will consider different sedation meds if bradycardia worsens   Goal normotension, Continue to monitor cardiopulmonary status maintain MAP>65 mmHg.     Pulm:  Diagnoses: No active issues  PLAN:  Monitor and Maintain for SpO2 > 92%.     GI:  Diagnoses: Hx of GERD  PLAN:  Protonix daily  Bowel regimen: Senokot, miralax, daily dulcolax suppository   Last BM: 4/15     :  Diagnoses: No active issues  PLAN:   Trend strict I/Os, UOP  Continue to trend Cr     F/E/N:  F: Fluid resuscitate prn  E: Replete electrolytes as needed for goal Mag > 2.0, Phos >3.0, K >4.0  N: regular diet     Heme/Onc:  Diagnoses: CNS Lymphoma: large B cell  PLAN:  Biopsy of L anterior basal ganglia mass via Neurosurgery 4/14  Pathology: large B cell lymphoma  Med Onc Consulted: Will initiate Methotrexate, Rituximab pending MRI results, appreciate Onc reccs  MRI Spine to assess for spinal masses need for intrathecal methotrexate  VTE: Restart SQH today, SCDs      Endo:   Diagnoses: Hx of DM2  PLAN:  Hold home metformin and janumet  SSI  Monitor BG for Goal 140-160     ID:  Diagnoses: No active issues  PLAN:  Trend fever curve/white count.     MSK/Skin:  Diagnoses: No active Issues  PLAN:  Frequent turns/repositioning,  offloading  PT/OT    Lines: PICC 4/17, EVD    Disposition: Critical care    ICU Core Measures     A: Assess, Prevent, and Manage Pain Has pain been assessed? Yes  Need for changes to pain regimen? No   B: Both SAT/SAT  N/A   C: Choice of Sedation RASS Goal: 0 Alert and Calm  Need for changes to sedation or analgesia regimen? No   D: Delirium CAM-ICU: Negative   E: Early Mobility  Plan for early mobility? Yes   F: Family Engagement Plan for family engagement today? Yes         Prophylaxis:  VTE Contraindicated secondary to: Brain Mass and Biopsy Today   Stress Ulcer  covered byomeprazole (PriLOSEC) 20 mg delayed release capsule [[id number]] (Long-Term Med), pantoprazole (PROTONIX) injection 40 mg [[id number]]         24 Hour Events : Patient had increasing agitation overnight, given 0.5 mg ativan and improved. Patient has been persistently bradycardic, however normotensive and no change in HD status. Oncology meeting yesterday given the pathology findings, plan to initiate therapy with methotrexate and rituximab pending MRI spine imaging.    Subjective       Objective :                   Vitals I/O      Most Recent Min/Max in 24hrs   Temp 97.8 °F (36.6 °C) Temp  Min: 97.4 °F (36.3 °C)  Max: 98.4 °F (36.9 °C)   Pulse (!) 36 Pulse  Min: 34  Max: 54   Resp 16 Resp  Min: 15  Max: 23   /72 BP  Min: 112/60  Max: 151/77   O2 Sat 96 % SpO2  Min: 96 %  Max: 99 %      Intake/Output Summary (Last 24 hours) at 4/17/2025 0741  Last data filed at 4/17/2025 0600  Gross per 24 hour   Intake 297.39 ml   Output 1182 ml   Net -884.61 ml       Diet Dysphagia/Modified Consistency; Dysphagia 3-Dental Soft; Thin Liquid    Invasive Monitoring           Physical Exam   Physical Exam  Vitals reviewed.   Eyes:      Extraocular Movements: Extraocular movements intact.      Pupils: Pupils are equal, round, and reactive to light.   Skin:     General: Skin is warm and dry.   HENT:      Head:      Comments: EVD  Cardiovascular:      Rate and  Rhythm: Normal rate and regular rhythm.      Pulses: Normal pulses.   Musculoskeletal:         General: Normal range of motion.      Right lower leg: No edema.      Left lower leg: No edema.   Abdominal: General: Bowel sounds are normal. There is no distension.      Palpations: Abdomen is soft.      Tenderness: There is no abdominal tenderness.   Constitutional:       General: He is not in acute distress.  Pulmonary:      Effort: Pulmonary effort is normal.      Breath sounds: Normal breath sounds.   Neurological:      Comments: Patient is AAOx2, unable to recall place, Follows Motor Commands with prompting, waxing and waning          Diagnostic Studies        Lab Results: I have reviewed the following results:     Medications:  Scheduled PRN   atorvastatin, 20 mg, Daily  bisacodyl, 10 mg, Daily  chlorhexidine, 15 mL, Q12H CAIT  [Held by provider] Cholecalciferol, 2,000 Units, Daily  [Held by provider] vitamin B-12, 1,000 mcg, Daily  dexamethasone, 4 mg, Q6H CAIT  heparin (porcine), 5,000 Units, Q12H CAIT  insulin lispro, 1-6 Units, Q6H  levETIRAcetam, 500 mg, Q12H CAIT  pantoprazole, 40 mg, Early Morning  polyethylene glycol, 17 g, Daily  senna, 2 tablet, HS      acetaminophen, 650 mg, Q6H PRN  aluminum-magnesium hydroxide-simethicone, 30 mL, Q4H PRN  calcium carbonate, 1,000 mg, Daily PRN  HYDROmorphone, 0.2 mg, Q2H PRN  LORazepam, 0.5 mg, Daily PRN  ondansetron, 4 mg, Q6H PRN  oxyCODONE, 2.5 mg, Q4H PRN   Or  oxyCODONE, 5 mg, Q4H PRN       Continuous    dexmedetomidine, 0.1-0.7 mcg/kg/hr, Last Rate: 0.2 mcg/kg/hr (04/17/25 0415)           Labs:   CBC    Recent Labs     04/16/25  0511 04/17/25  0621   WBC 11.53* 6.88   HGB 13.3 13.7   HCT 36.7 39.1   * 149     BMP    Recent Labs     04/16/25  0511 04/17/25  0621   SODIUM 137 136   K 4.0 4.1    104   CO2 23 26   AGAP 7 6   BUN 17 23   CREATININE 0.71 0.76   CALCIUM 8.8 9.1       Coags    No recent results       Additional Electrolytes  Recent Labs      04/16/25  0511 04/17/25  0621   MG 2.1 2.1   PHOS 3.0 3.3          Blood Gas    No recent results  No recent results   LFTs  Recent Labs     04/16/25  0511   ALT 37   AST 15   ALKPHOS 42   ALB 3.7   TBILI 0.64       Infectious  No recent results  Glucose  Recent Labs     04/16/25  0511 04/17/25  0621   GLUC 169* 187*

## 2025-04-18 ENCOUNTER — APPOINTMENT (INPATIENT)
Dept: RADIOLOGY | Facility: HOSPITAL | Age: 65
DRG: 820 | End: 2025-04-18
Payer: COMMERCIAL

## 2025-04-18 LAB
ANION GAP SERPL CALCULATED.3IONS-SCNC: 8 MMOL/L (ref 4–13)
BACTERIA UR QL AUTO: ABNORMAL /HPF
BACTERIA UR QL AUTO: ABNORMAL /HPF
BACTERIA UR QL AUTO: NORMAL /HPF
BASOPHILS # BLD MANUAL: 0 THOUSAND/UL (ref 0–0.1)
BASOPHILS NFR MAR MANUAL: 0 % (ref 0–1)
BILIRUB UR QL STRIP: NEGATIVE
BUN SERPL-MCNC: 18 MG/DL (ref 5–25)
CALCIUM SERPL-MCNC: 9.1 MG/DL (ref 8.4–10.2)
CHLORIDE SERPL-SCNC: 97 MMOL/L (ref 96–108)
CLARITY UR: CLEAR
CO2 SERPL-SCNC: 29 MMOL/L (ref 21–32)
COLOR UR: ABNORMAL
COLOR UR: ABNORMAL
COLOR UR: COLORLESS
CREAT SERPL-MCNC: 0.74 MG/DL (ref 0.6–1.3)
EOSINOPHIL # BLD MANUAL: 0 THOUSAND/UL (ref 0–0.4)
EOSINOPHIL NFR BLD MANUAL: 0 % (ref 0–6)
ERYTHROCYTE [DISTWIDTH] IN BLOOD BY AUTOMATED COUNT: 11.5 % (ref 11.6–15.1)
GFR SERPL CREATININE-BSD FRML MDRD: 96 ML/MIN/1.73SQ M
GLUCOSE SERPL-MCNC: 206 MG/DL (ref 65–140)
GLUCOSE SERPL-MCNC: 229 MG/DL (ref 65–140)
GLUCOSE SERPL-MCNC: 240 MG/DL (ref 65–140)
GLUCOSE SERPL-MCNC: 245 MG/DL (ref 65–140)
GLUCOSE UR STRIP-MCNC: ABNORMAL MG/DL
HCT VFR BLD AUTO: 38.8 % (ref 36.5–49.3)
HGB BLD-MCNC: 13.9 G/DL (ref 12–17)
HGB UR QL STRIP.AUTO: ABNORMAL
HGB UR QL STRIP.AUTO: ABNORMAL
HGB UR QL STRIP.AUTO: NEGATIVE
KETONES UR STRIP-MCNC: NEGATIVE MG/DL
LDH SERPL-CCNC: 147 U/L (ref 140–271)
LEUKOCYTE ESTERASE UR QL STRIP: NEGATIVE
LYMPHOCYTES # BLD AUTO: 0.53 THOUSAND/UL (ref 0.6–4.47)
LYMPHOCYTES # BLD AUTO: 5 % (ref 14–44)
MAGNESIUM SERPL-MCNC: 1.9 MG/DL (ref 1.9–2.7)
MCH RBC QN AUTO: 30.9 PG (ref 26.8–34.3)
MCHC RBC AUTO-ENTMCNC: 35.8 G/DL (ref 31.4–37.4)
MCV RBC AUTO: 86 FL (ref 82–98)
MONOCYTES # BLD AUTO: 0 THOUSAND/UL (ref 0–1.22)
MONOCYTES NFR BLD: 0 % (ref 4–12)
NEUTROPHILS # BLD MANUAL: 10.06 THOUSAND/UL (ref 1.85–7.62)
NEUTS SEG NFR BLD AUTO: 95 % (ref 43–75)
NITRITE UR QL STRIP: NEGATIVE
NON-SQ EPI CELLS URNS QL MICRO: ABNORMAL /HPF
NON-SQ EPI CELLS URNS QL MICRO: ABNORMAL /HPF
NON-SQ EPI CELLS URNS QL MICRO: NORMAL /HPF
PH UR STRIP.AUTO: 7 [PH]
PH UR STRIP.AUTO: 7.5 [PH]
PH UR STRIP.AUTO: 7.5 [PH]
PHOSPHATE SERPL-MCNC: 3.1 MG/DL (ref 2.3–4.1)
PLATELET # BLD AUTO: 140 THOUSANDS/UL (ref 149–390)
PLATELET BLD QL SMEAR: ABNORMAL
PMV BLD AUTO: 11 FL (ref 8.9–12.7)
POTASSIUM SERPL-SCNC: 3.7 MMOL/L (ref 3.5–5.3)
PROT UR STRIP-MCNC: ABNORMAL MG/DL
PROT UR STRIP-MCNC: ABNORMAL MG/DL
PROT UR STRIP-MCNC: NEGATIVE MG/DL
RBC # BLD AUTO: 4.5 MILLION/UL (ref 3.88–5.62)
RBC #/AREA URNS AUTO: ABNORMAL /HPF
RBC #/AREA URNS AUTO: ABNORMAL /HPF
RBC #/AREA URNS AUTO: NORMAL /HPF
RBC MORPH BLD: NORMAL
SODIUM SERPL-SCNC: 134 MMOL/L (ref 135–147)
SP GR UR STRIP.AUTO: 1.01 (ref 1–1.03)
SP GR UR STRIP.AUTO: 1.01 (ref 1–1.03)
SP GR UR STRIP.AUTO: 1.02 (ref 1–1.03)
URATE SERPL-MCNC: 2.3 MG/DL (ref 3.5–8.5)
UROBILINOGEN UR STRIP-ACNC: <2 MG/DL
WBC # BLD AUTO: 10.59 THOUSAND/UL (ref 4.31–10.16)
WBC #/AREA URNS AUTO: ABNORMAL /HPF
WBC #/AREA URNS AUTO: ABNORMAL /HPF
WBC #/AREA URNS AUTO: NORMAL /HPF

## 2025-04-18 PROCEDURE — 88108 CYTOPATH CONCENTRATE TECH: CPT | Performed by: STUDENT IN AN ORGANIZED HEALTH CARE EDUCATION/TRAINING PROGRAM

## 2025-04-18 PROCEDURE — 88312 SPECIAL STAINS GROUP 1: CPT | Performed by: STUDENT IN AN ORGANIZED HEALTH CARE EDUCATION/TRAINING PROGRAM

## 2025-04-18 PROCEDURE — 97530 THERAPEUTIC ACTIVITIES: CPT

## 2025-04-18 PROCEDURE — 3E04305 INTRODUCTION OF OTHER ANTINEOPLASTIC INTO CENTRAL VEIN, PERCUTANEOUS APPROACH: ICD-10-PCS | Performed by: INTERNAL MEDICINE

## 2025-04-18 PROCEDURE — 83735 ASSAY OF MAGNESIUM: CPT

## 2025-04-18 PROCEDURE — 97112 NEUROMUSCULAR REEDUCATION: CPT

## 2025-04-18 PROCEDURE — 88342 IMHCHEM/IMCYTCHM 1ST ANTB: CPT | Performed by: STUDENT IN AN ORGANIZED HEALTH CARE EDUCATION/TRAINING PROGRAM

## 2025-04-18 PROCEDURE — 84100 ASSAY OF PHOSPHORUS: CPT

## 2025-04-18 PROCEDURE — 84550 ASSAY OF BLOOD/URIC ACID: CPT

## 2025-04-18 PROCEDURE — 82948 REAGENT STRIP/BLOOD GLUCOSE: CPT

## 2025-04-18 PROCEDURE — 99233 SBSQ HOSP IP/OBS HIGH 50: CPT | Performed by: ANESTHESIOLOGY

## 2025-04-18 PROCEDURE — 99024 POSTOP FOLLOW-UP VISIT: CPT | Performed by: SPECIALIST

## 2025-04-18 PROCEDURE — NC001 PR NO CHARGE

## 2025-04-18 PROCEDURE — 80048 BASIC METABOLIC PNL TOTAL CA: CPT

## 2025-04-18 PROCEDURE — 97116 GAIT TRAINING THERAPY: CPT

## 2025-04-18 PROCEDURE — 81001 URINALYSIS AUTO W/SCOPE: CPT | Performed by: INTERNAL MEDICINE

## 2025-04-18 PROCEDURE — 97535 SELF CARE MNGMENT TRAINING: CPT

## 2025-04-18 PROCEDURE — 85007 BL SMEAR W/DIFF WBC COUNT: CPT

## 2025-04-18 PROCEDURE — 83615 LACTATE (LD) (LDH) ENZYME: CPT

## 2025-04-18 PROCEDURE — 88341 IMHCHEM/IMCYTCHM EA ADD ANTB: CPT | Performed by: STUDENT IN AN ORGANIZED HEALTH CARE EDUCATION/TRAINING PROGRAM

## 2025-04-18 PROCEDURE — 85027 COMPLETE CBC AUTOMATED: CPT

## 2025-04-18 PROCEDURE — 99233 SBSQ HOSP IP/OBS HIGH 50: CPT | Performed by: INTERNAL MEDICINE

## 2025-04-18 PROCEDURE — 82232 ASSAY OF BETA-2 PROTEIN: CPT

## 2025-04-18 RX ORDER — METOCLOPRAMIDE HYDROCHLORIDE 5 MG/ML
10 INJECTION INTRAMUSCULAR; INTRAVENOUS ONCE
Status: COMPLETED | OUTPATIENT
Start: 2025-04-18 | End: 2025-04-18

## 2025-04-18 RX ORDER — INSULIN LISPRO 100 [IU]/ML
5-25 INJECTION, SOLUTION INTRAVENOUS; SUBCUTANEOUS
Status: DISCONTINUED | OUTPATIENT
Start: 2025-04-18 | End: 2025-05-03

## 2025-04-18 RX ORDER — QUETIAPINE FUMARATE 25 MG/1
25 TABLET, FILM COATED ORAL ONCE
Status: COMPLETED | OUTPATIENT
Start: 2025-04-18 | End: 2025-04-18

## 2025-04-18 RX ORDER — SODIUM CHLORIDE 9 MG/ML
20 INJECTION, SOLUTION INTRAVENOUS ONCE AS NEEDED
Status: DISCONTINUED | OUTPATIENT
Start: 2025-04-18 | End: 2025-05-01

## 2025-04-18 RX ORDER — POTASSIUM CHLORIDE 14.9 MG/ML
20 INJECTION INTRAVENOUS ONCE
Status: DISCONTINUED | OUTPATIENT
Start: 2025-04-18 | End: 2025-04-18

## 2025-04-18 RX ADMIN — ATORVASTATIN CALCIUM 20 MG: 20 TABLET, FILM COATED ORAL at 09:58

## 2025-04-18 RX ADMIN — ALLOPURINOL 300 MG: 300 TABLET ORAL at 09:58

## 2025-04-18 RX ADMIN — INSULIN LISPRO 4 UNITS: 100 INJECTION, SOLUTION INTRAVENOUS; SUBCUTANEOUS at 00:01

## 2025-04-18 RX ADMIN — LEVETIRACETAM 500 MG: 500 TABLET, FILM COATED ORAL at 21:12

## 2025-04-18 RX ADMIN — INSULIN LISPRO 10 UNITS: 100 INJECTION, SOLUTION INTRAVENOUS; SUBCUTANEOUS at 17:28

## 2025-04-18 RX ADMIN — SODIUM BICARBONATE 150 ML/HR: 84 INJECTION, SOLUTION INTRAVENOUS at 00:41

## 2025-04-18 RX ADMIN — PANTOPRAZOLE SODIUM 40 MG: 40 TABLET, DELAYED RELEASE ORAL at 06:16

## 2025-04-18 RX ADMIN — QUETIAPINE FUMARATE 25 MG: 25 TABLET ORAL at 21:12

## 2025-04-18 RX ADMIN — DEXAMETHASONE SODIUM PHOSPHATE 4 MG: 4 INJECTION INTRA-ARTICULAR; INTRALESIONAL; INTRAMUSCULAR; INTRAVENOUS; SOFT TISSUE at 06:17

## 2025-04-18 RX ADMIN — METOCLOPRAMIDE 10 MG: 5 INJECTION, SOLUTION INTRAMUSCULAR; INTRAVENOUS at 06:41

## 2025-04-18 RX ADMIN — METHOTREXATE 16000 MG: 25 INJECTION, SOLUTION INTRA-ARTERIAL; INTRAMUSCULAR; INTRATHECAL; INTRAVENOUS at 13:57

## 2025-04-18 RX ADMIN — LEVETIRACETAM 500 MG: 500 TABLET, FILM COATED ORAL at 09:58

## 2025-04-18 RX ADMIN — ONDANSETRON: 2 INJECTION INTRAMUSCULAR; INTRAVENOUS at 13:31

## 2025-04-18 RX ADMIN — POLYETHYLENE GLYCOL 3350 17 G: 17 POWDER, FOR SOLUTION ORAL at 09:58

## 2025-04-18 RX ADMIN — DEXAMETHASONE SODIUM PHOSPHATE 4 MG: 4 INJECTION INTRA-ARTICULAR; INTRALESIONAL; INTRAMUSCULAR; INTRAVENOUS; SOFT TISSUE at 18:10

## 2025-04-18 RX ADMIN — DEXAMETHASONE SODIUM PHOSPHATE 4 MG: 4 INJECTION INTRA-ARTICULAR; INTRALESIONAL; INTRAMUSCULAR; INTRAVENOUS; SOFT TISSUE at 00:01

## 2025-04-18 RX ADMIN — CHLORHEXIDINE GLUCONATE 0.12% ORAL RINSE 15 ML: 1.2 LIQUID ORAL at 09:58

## 2025-04-18 RX ADMIN — INSULIN LISPRO 10 UNITS: 100 INJECTION, SOLUTION INTRAVENOUS; SUBCUTANEOUS at 12:29

## 2025-04-18 RX ADMIN — SODIUM BICARBONATE 150 ML/HR: 84 INJECTION, SOLUTION INTRAVENOUS at 12:32

## 2025-04-18 RX ADMIN — CHLORHEXIDINE GLUCONATE 0.12% ORAL RINSE 15 ML: 1.2 LIQUID ORAL at 21:12

## 2025-04-18 RX ADMIN — INSULIN LISPRO 4 UNITS: 100 INJECTION, SOLUTION INTRAVENOUS; SUBCUTANEOUS at 06:16

## 2025-04-18 RX ADMIN — SODIUM BICARBONATE 150 ML/HR: 84 INJECTION, SOLUTION INTRAVENOUS at 21:12

## 2025-04-18 NOTE — QUICK NOTE
4/18/2025 8:12 AM -  Alexis Dennison's chart and case were reviewed by Rylie Stapleton PA-C.  Mode of review included electronic chart check, and communication with RN.      Per review, symptoms remain controlled on current regimen and no changes are made at this time. Patient was unable to tolerate MRI brain, plan to reattempt on Sunday. RN to notify palliative medicine if his spouse arrives at bedside so that we can provide support. Otherwise, will re-visit on Monday 4/21. Page sooner with questions or concerns.        For urgent issues or any questions/concerns, please notify on-call provider via [employer] Secure Chat.  You may also call our answering service 24/7 at [phone].    Rylie Stapleton PA-C  Palliative and Supportive Care  Clinic/Answering Service: [phone]  You can find me on [employer] Chat!

## 2025-04-18 NOTE — NURSING NOTE
MRI of spine unable to be completed.     Patient given 2 mg of ativan prior to MRI, in addition to another 4 mg of ativan and 5 mg of halodol while in MRI.    Despite medication administration, patient was unable to remain still and continuously hit his face off of the MRI coils.    After communication with PATI Howard, and the MRI technician, it was decided to abort the MRI.

## 2025-04-18 NOTE — PHYSICAL THERAPY NOTE
PHYSICAL THERAPY NOTE          Patient Name: Alexis Dennison  Today's Date: 4/18/2025 04/18/25 0851   PT Last Visit   PT Visit Date 04/18/25   Note Type   Note Type Treatment   Pain Assessment   Pain Assessment Tool FLACC   Pain Rating: FLACC (Rest) - Face 0   Pain Rating: FLACC (Rest) - Legs 0   Pain Rating: FLACC (Rest) - Activity 0   Pain Rating: FLACC (Rest) - Cry 0   Pain Rating: FLACC (Rest) - Consolability 0   Score: FLACC (Rest) 0   Pain Rating: FLACC (Activity) - Face 0   Pain Rating: FLACC (Activity) - Legs 1   Pain Rating: FLACC (Activity) - Activity 1   Pain Rating: FLACC (Activity) - Cry 0   Pain Rating: FLACC (Activity) - Consolability 1   Score: FLACC (Activity) 3   Restrictions/Precautions   Weight Bearing Precautions Per Order No   Other Precautions Pain;Fall Risk;Telemetry;O2;Multiple lines;Bed Alarm;Chair Alarm;Impulsive   General   Chart Reviewed Yes   Family/Caregiver Present No   Cognition   Orientation Level Oriented to person   Subjective   Subjective Pt agreeable to PT session   Bed Mobility   Supine to Sit 3  Moderate assistance   Additional items Assist x 1   Sit to Supine Unable to assess   Additional Comments Pt left resting in chair, call beel in reach, chair alamr active   Transfers   Sit to Stand 3  Moderate assistance   Additional items Assist x 2   Stand to Sit 3  Moderate assistance   Additional items Assist x 2   Ambulation/Elevation   Gait pattern Shuffling;Excessively slow;Decreased foot clearance   Gait Assistance 3  Moderate assist   Additional items Assist x 1   Assistive Device Other (Comment)  (HHA)   Distance 4'   Balance   Static Sitting Fair +   Dynamic Sitting Fair -   Static Standing Zero   Ambulatory Zero   Endurance Deficit   Endurance Deficit Yes   Endurance Deficit Description limited by fatigue, weakness, pain   Activity Tolerance   Activity Tolerance Other (Comment)  (cognitive)    Medical Staff Made Aware OT   Nurse Made Aware yes, nsg gave clearance to work with pt   Exercises   Balance training  sitting EOB   Assessment   Prognosis Fair   Problem List Decreased strength;Decreased range of motion;Decreased endurance;Impaired balance;Decreased mobility;Decreased coordination;Impaired judgement;Decreased cognition;Decreased safety awareness;Pain;Orthopedic restrictions   Assessment Pt continues to require verbal, visual, and tactile cures to complete mobility with aphasia and cognitive deficits. Required tactile cues for upright posture during standing. Ambulated with very unsteady gait. High risk of falls with decreased awareness of currentl limitations. Pt will benefit from continued inpt skilled PT and rehab to maximize functional mobility & safety.   Goals   Patient Goals None verbalized   STG Expiration Date 04/27/25   PT Treatment Day 3   Plan   Treatment/Interventions OT;Spoke to case management;Spoke to nursing;Gait training;Bed mobility;Patient/family training;Endurance training;LE strengthening/ROM;Functional transfer training   Progress Progressing toward goals   PT Frequency 3-5x/wk   Discharge Recommendation   Rehab Resource Intensity Level, PT I (Maximum Resource Intensity)   AM-PAC Basic Mobility Inpatient   Turning in Flat Bed Without Bedrails 3   Lying on Back to Sitting on Edge of Flat Bed Without Bedrails 2   Moving Bed to Chair 2   Standing Up From Chair Using Arms 2   Walk in Room 2   Climb 3-5 Stairs With Railing 2   Basic Mobility Inpatient Raw Score 13   Basic Mobility Standardized Score 33.99   Turning Head Towards Sound 2   Follow Simple Instructions 2   Low Function Basic Mobility Raw Score  17   Low Function Basic Mobility Standardized Score  27.46   [facility] Highest Level Of Mobility   -[facility] Goal 4: Move to chair/commode   -HLM Achieved 4: Move to chair/commode     Shikha Whitehead PT, DPT

## 2025-04-18 NOTE — SPEECH THERAPY NOTE
Pt just finished lunch. Spoke with RN, who reported pt seems to be tolerating current diet well. Pt currently has waxing and waning [place], and is currently drowsy, therefore not appropriate for speech/language tx at this time. ST to follow up for dysphagia and speech/language tx/reassessment.

## 2025-04-18 NOTE — ASSESSMENT & PLAN NOTE
POD#4 - s/p L frontal endoscopic biopsy of ventricular mass and replacement of EVD  (Causey, 25)  Subependymal lesions with large left basal ganglia suspicious for lymphoma.  P/w confusion, short term memory loss.  Patient had multiple nondiagnostic LPs and hematology/oncology was requesting a biopsy to confirm.   - patient had worsening mental status change and CT head demonstrated progressive ventriculomegaly and likely trapped left lateral ventricle, ultimately a left frontal EVD was placed.   - patient self extubated.  Preliminary pathology, small round blue cell tumor; differential Dx includes lymphoma   EVD replaced during surgery on     Imagin/14 CTH: Left frontal approach ventriculostomy catheter terminating near the foramen of Monro with increased left frontal lobe pneumocephalus and gas within the left frontal horn since prior study. Slightly increased small amount of layering intraventricular hemorrhage within the left occipital horn. Improved hydrocephalus primarily of the left lateral ventricle since prior study.Stable hyperdense mass centered within the left basal ganglia with surrounding vasogenic edema and mild rightward midline shift    Plan:   Continue to closely monitor neuro exam   Frequent neuro checks per primary team   Repeat STAT CTH with any acute decline in GCS > 2pts or more in 1hr   Maintain normotensive BP goals, SBP < 160, MAP > 65   L frontal EVD in place (replaced on  in OR)   Currently open @ 20. Plan to clamp today  Monitor output and ICP closely.  Output 18 mL / 24 hours  ICP -1-5, ICP 8 while in room  If patient fails over the weekend reopening EVD and drop to 15  Plan for repeat MRI brain w/wo, BT protocol on  to assess response to treatment/if need for VPS   Continue local wound care to incision   Will leave dressing in place given pt will reach up and touch his head   Final pathology: primary CNS non-Hodgkin's lymphoma, diffuse large B-cell  lymphoma   Oncology following closely   Recommend completing staging for his lymphoproliferative disease w/ MRI imaging of the cervical, thoracic, and lumbar spine w/wo contrast   Plan is for treatment with the following regimen:   Rituximab 375 mg/m² intravenously yesterday  Initial infusion of high-dose methotrexate 8 g/m² intravenously over 4 hours today  Initiate intrathecal cytarabine with cycle 1 of high-dose methotrexate.  Continue intrathecal Daria-C every 2 weeks x 4  Continue decadron 4mg q 6hrs   Continue keppra 500mg BID x 7 days for seizure ppx   Hold all AC/AP meds   SBP<140  DVT ppx: SCDs, SQH BID   Medical management per primary team   Pain control per primary team   PT/OT   Social work following for assistance with dispo once medically cleared      Neurosurgery will continue to follow, call with any further questions or concerns.

## 2025-04-18 NOTE — PROGRESS NOTES
Progress Note - Critical Care/ICU   Name: Alexis Dennison 64 y.o. male I MRN: [id number]  Unit/Bed#: ICU 03 I Date of Admission: 3/29/2025   Date of Service: 4/18/2025 I Hospital Day: 20       24 hour events:  Attempted MRI but unsuccessful - will discuss with teams regarding timing and strategy for facilitating MRI including intubation  Started rutiximab yesterday, plan for intiation of methotrexate and intrathecal cytarabine   Placed on Bicarb for urine alkalization urine pH 7.5 this am   EVD with 18 cc out, clamped this am     Physical exam:  When I walked into room he was OOB but sleeping in chair. Lethargic but arousable, opens eyes and takes several prompts to follow commands, of note he had received several doses of ativan for sedation for MRI  EVD clamped  SB 40-50s  No respiratory distress on RA  Ab soft  Ext warm and well perfused     Impression:  Hyperdense left basal ganglia with surrounding vasogenic edema with nonspecific 12 mm hypodense right hepatic lobe lesion and nonspecific 4 mm right pulm lower lobe lesion  Obstructive hydrocephalus s/p EVD 4/13  Encephalopathy   Heterogenous nodular enlargement of left thyroid lobe  Delirium   High grade lymphoma awaiting MRI of spine for final staging     Plan:  EVD clamped today, will remain in place thorough weekend - will evaluate with MRI Bin evening and is on the schedule for possible VPS Monday, given need for intrathecal cytarabine may be a candidate for Ommaya catheter in the future  Redirect for delirium   Will discuss with radiology scheduling MRI and discussing with Heme-Onc priority of spinal MRI. Ideally will arrange for MRI head and spinal MRI to be together. Given failed sedation attempts maybe best to facilitate intubation and secure airway for MRI   Heme-onc started Rutiximab yesterday, planning for methotrexate today and possibly intrathecal cytarabine  Seroquel PRN for sleep aid  Decadron  Keppra 7 day ppx  Glucose control SSI, increase  SSI  DVT ppx BID (on hold until confirmation from Heme-onc with planned intrathecal cytarabine and access)  SLP following, dysphagia diet, cont bowel regimen   Home PPI  Dispo: Remain in ICU while EVD in place     Afternoon update:  Planning for MRI Saturday night/Sunday morning of Head and C/T/L spine - will likely need intubation  Heme-onc not planning for IT cytarabine   Resumed SubQH

## 2025-04-18 NOTE — PROGRESS NOTES
Progress Note - Critical Care/ICU   Name: Alexis Dennison 64 y.o. male I MRN: [id number]  Unit/Bed#: ICU 03 I Date of Admission: 3/29/2025   Date of Service: 2025 I Hospital Day: 20       Assessment & Plan   Active Hospital Problems    Diagnosis Date Noted POA    Brain tumor (HCC) 2025 Yes    Goals of care, counseling/discussion 2025 Not Applicable    Palliative care encounter 2025 Not Applicable    Primary CNS lymphoma 2025 Unknown    Encephalopathy 2025 Yes    Constipation 2025 No    Ambulatory dysfunction 2025 Yes    Thyroid nodule 2025 Yes    Pulmonary nodule 2025 Yes    Liver lesion 2025 Yes    Type 2 diabetes mellitus with hyperglycemia, without long-term current use of insulin (HCC) 2022 Yes     Chronic    Mixed hyperlipidemia 2022 Yes     Chronic    HTN, goal below 130/80 2022 Yes     Chronic      Resolved Hospital Problems    Diagnosis Date Noted Date Resolved POA    Foot erythema 2025 Unknown    Abnormal CT scan 2025 Yes     Neuro/Psych:  Diagnoses: Left basal ganglia mass w/ surrounding edema S/P biopsy w/ concern for CNS Large B Cell Lymphoma; Obstructive hydrocephalus; Small IVH; Encephalopathy   Imagin/11 MRI: Interval enlargement of the dominant left anterior basal ganglionic mass lesion with greater surrounding vasogenic edema and mass effect. Redemonstrated findings of leptomeningeal and intraventricular seeding with obstructive hydrocephalus and transependymal flow of CSF    CT head: largely unchanged left sided obstructive hydrocephalus, blood products within the occipital horn of the left lateral ventricle is similar from the MRI brain . Left anterior basal ganglia mass with surrounding edema, regional mass effect, and effacement of the left suprasellar cistern, appears similar to MRI of the brain 2025 S/P Bedside EVD placement for obstructive hydrocephalus  on 4/13 4/14 S/P Biopsy of brain mass via Neurosurgery and replaced EVD  Plan:  Neurosurgery following, appreciate recommendations   EVD at 20  Decadron 4 mg q6 hours  Keppra for seizure ppx   Repeat CTH for any change in GCS > 2 points in 1 hour   Neuro checks every 2 hours   Analgesia: Multimodal.  Tylenol 650 mg as needed for mild pain; oxycodone 2.5/5 for moderate to severe pain; Dilaudid 0.2 mg for breakthrough pain    CV:  Diagnoses: Hypertension, hyperlipidemia; sinus bradycardia  Plan:  Continue atorvastatin 20 daily  Continuous cardiopulmonary monitoring. Maintain MAP >65.    Pulm:  Diagnoses: No active issues   Plan:  Continuous pulse oximetry. Maintain O2 sat >92%.     GI:  Diagnoses: GERD  Last BM: 4/15  Plan:  Continue PPI  Bowel regimen: Senna, MiraLax, Dulcolax     :  Diagnoses: No active issues  Baseline creatinine: 0.8-0.9  Plan:  Trend renal indices   Monitor I/Os    F/E/N:  Plan:   F: Bicarb in dextrose @ 150 mL/h. Fluid resuscitation prn.  E: Monitor and replete electrolytes for Mg >2, Phos >3, K >4.  N: Dysphagia diet 3    Heme/Onc:  Diagnoses: Concern for CNS lymphoma  Plan:  Oncology following, appreciate recommendations  Started rituximab yesterday  Planning to initiate high-dose methotrexate & intrathecal methotrexate  Continue allopurinol daily  Maintain bicarb drip  VTE prophylaxis: Subcu heparin    Endo:  Diagnoses: Type 2 diabetes mellitus  Home regimen: Metformin, Janumet  Plan:   Insulin: Continue sliding scale insulin. Monitor blood glucose.    ID:  Diagnoses: No active issues  Plan:  Monitor fever curve and WBC.    MSK/Skin:  Diagnoses: Surgical incisions  Plan:  PT/OT when appropriate. Encourage OOB and ambulation when appropriate. Local wound care prn.    LDAs:  Lines - right PICC line, PIV (1)   Drains - EVD  Airways -  n/a    Disposition: Critical care    ICU Core Measures     A: Assess, Prevent, and Manage Pain Has pain been assessed? Yes  Need for changes to pain regimen? No    B: Both SAT/SAT  N/A   C: Choice of Sedation RASS Goal: 0 Alert and Calm or N/A patient not on sedation  Need for changes to sedation or analgesia regimen? NA   D: Delirium CAM-ICU: Unable to perform secondary to Acute cognitive dysfunction   E: Early Mobility  Plan for early mobility? Yes   F: Family Engagement Plan for family engagement today? Yes       Review of Invasive Devices:      Central access plan: Medications requiring central line      Prophylaxis:  VTE VTE covered by:  heparin (porcine), Subcutaneous, 5,000 Units at 04/17/25 2114       Stress Ulcer  covered byomeprazole (PriLOSEC) 20 mg delayed release capsule [[id number]] (Long-Term Med), pantoprazole (PROTONIX) EC tablet 40 mg [[id number]]         24 Hour Events : Attempted to obtain MRI overnight, however, after 6 mg of Ativan and 5 of Haldol patient still moving in MRI machine and unable to obtain at this time.      Subjective       Objective :                   Vitals I/O      Most Recent Min/Max in 24hrs   Temp 97.5 °F (36.4 °C) Temp  Min: 97.3 °F (36.3 °C)  Max: 97.7 °F (36.5 °C)   Pulse (!) 46 Pulse  Min: 36  Max: 58   Resp 21 Resp  Min: 16  Max: 30   /73 BP  Min: 111/54  Max: 162/87   O2 Sat 97 % SpO2  Min: 95 %  Max: 98 %      Intake/Output Summary (Last 24 hours) at 4/18/2025 0703  Last data filed at 4/18/2025 0600  Gross per 24 hour   Intake 2774.66 ml   Output 2493 ml   Net 281.66 ml       Diet Dysphagia/Modified Consistency; Dysphagia 3-Dental Soft; Thin Liquid    Invasive Monitoring           Physical Exam   Physical Exam  Vitals and nursing note reviewed.   Eyes:      Extraocular Movements: Extraocular movements intact.      Conjunctiva/sclera: Conjunctivae normal.      Pupils: Pupils are equal, round, and reactive to light.   Skin:     General: Skin is warm and dry.   HENT:      Head:      Comments: EVD in place; Surgical sutures in place, C/D/I     Nose: No congestion.      Mouth/Throat:      Mouth: Mucous membranes are moist.    Cardiovascular:      Rate and Rhythm: Regular rhythm. Bradycardia present.      Pulses: Normal pulses.      Heart sounds: Normal heart sounds.   Abdominal:      Palpations: Abdomen is soft.   Constitutional:       General: He is not in acute distress.     Appearance: He is well-developed.   Pulmonary:      Effort: Pulmonary effort is normal.   Neurological:      Comments: Alert and oriented to person and place this morning. Follows commands in all extremities. Exam overall waxing and waning.    Genitourinary/Anorectal:  external catheter present.        Diagnostic Studies        Lab Results: I have reviewed the following results:     Medications:  Scheduled PRN   allopurinol, 300 mg, Daily  atorvastatin, 20 mg, Daily  bisacodyl, 10 mg, Daily  chlorhexidine, 15 mL, Q12H CAIT  [Held by provider] Cholecalciferol, 2,000 Units, Daily  [Held by provider] vitamin B-12, 1,000 mcg, Daily  dexamethasone, 4 mg, Q6H CAIT  heparin (porcine), 5,000 Units, Q12H CAIT  insulin lispro, 2-12 Units, Q6H CAIT  levETIRAcetam, 500 mg, Q12H CAIT  pantoprazole, 40 mg, Early Morning  polyethylene glycol, 17 g, Daily  senna, 2 tablet, HS      acetaminophen, 650 mg, Q6H PRN  aluminum-magnesium hydroxide-simethicone, 30 mL, Q4H PRN  calcium carbonate, 1,000 mg, Daily PRN  HYDROmorphone, 0.2 mg, Q2H PRN  LORazepam, 0.5 mg, Daily PRN  ondansetron, 4 mg, Q6H PRN  oxyCODONE, 2.5 mg, Q4H PRN   Or  oxyCODONE, 5 mg, Q4H PRN  sodium chloride, 20 mL/hr, Once PRN       Continuous    sodium bicarbonate 100 mEq in dextrose 5 % 1,000 mL infusion, 150 mL/hr, Last Rate: 150 mL/hr (04/18/25 0041)         Labs:   CBC    Recent Labs     04/17/25  0621 04/18/25  0512   WBC 6.88 10.59*   HGB 13.7 13.9   HCT 39.1 38.8    140*     BMP    Recent Labs     04/17/25  0621 04/18/25  0512   SODIUM 136 134*   K 4.1 3.7    97   CO2 26 29   AGAP 6 8   BUN 23 18   CREATININE 0.76 0.74   CALCIUM 9.1 9.1       Coags    No recent results     Additional  Electrolytes  Recent Labs     04/17/25  0621 04/18/25  0512   MG 2.1 1.9   PHOS 3.3 3.1          Blood Gas    No recent results  No recent results LFTs  Recent Labs     04/17/25  0621   ALT 33   AST 11*   ALKPHOS 44   ALB 3.7   TBILI 0.50       Infectious  No recent results  Glucose  Recent Labs     04/17/25  0621 04/18/25  0512   GLUC 187* 240*

## 2025-04-18 NOTE — PROGRESS NOTES
Progress Note - Neurosurgery   Name: Alexis Dennison 64 y.o. male I MRN: [id number]  Unit/Bed#: ICU 03 I Date of Admission: 3/29/2025   Date of Service: 2025 I Hospital Day: 20    Assessment & Plan  Brain tumor (HCC)  POD#4 - s/p L frontal endoscopic biopsy of ventricular mass and replacement of EVD  (Causey, 25)  Subependymal lesions with large left basal ganglia suspicious for lymphoma.  P/w confusion, short term memory loss.  Patient had multiple nondiagnostic LPs and hematology/oncology was requesting a biopsy to confirm.   - patient had worsening mental status change and CT head demonstrated progressive ventriculomegaly and likely trapped left lateral ventricle, ultimately a left frontal EVD was placed.   - patient self extubated.  Preliminary pathology, small round blue cell tumor; differential Dx includes lymphoma   EVD replaced during surgery on     Imagin/14 CTH: Left frontal approach ventriculostomy catheter terminating near the foramen of Monro with increased left frontal lobe pneumocephalus and gas within the left frontal horn since prior study. Slightly increased small amount of layering intraventricular hemorrhage within the left occipital horn. Improved hydrocephalus primarily of the left lateral ventricle since prior study.Stable hyperdense mass centered within the left basal ganglia with surrounding vasogenic edema and mild rightward midline shift    Plan:   Continue to closely monitor neuro exam   Frequent neuro checks per primary team   Repeat STAT CTH with any acute decline in GCS > 2pts or more in 1hr   Maintain normotensive BP goals, SBP < 160, MAP > 65   L frontal EVD in place (replaced on  in OR)   Currently open @ 20. Plan to clamp today  Monitor output and ICP closely.  Output 18 mL / 24 hours  ICP -1-5, ICP 8 while in room  If patient fails over the weekend reopening EVD and drop to 15  Plan for repeat MRI brain w/wo, BT protocol on  to assess response to  treatment/if need for VPS   Continue local wound care to incision   Will leave dressing in place given pt will reach up and touch his head   Final pathology: primary CNS non-Hodgkin's lymphoma, diffuse large B-cell lymphoma   Oncology following closely   Recommend completing staging for his lymphoproliferative disease w/ MRI imaging of the cervical, thoracic, and lumbar spine w/wo contrast   Plan is for treatment with the following regimen:   Rituximab 375 mg/m² intravenously yesterday  Initial infusion of high-dose methotrexate 8 g/m² intravenously over 4 hours today  Initiate intrathecal cytarabine with cycle 1 of high-dose methotrexate.  Continue intrathecal Daria-C every 2 weeks x 4  Continue decadron 4mg q 6hrs   Continue keppra 500mg BID x 7 days for seizure ppx   Hold all AC/AP meds   SBP<140  DVT ppx: SCDs, SQH BID   Medical management per primary team   Pain control per primary team   PT/OT   Social work following for assistance with dispo once medically cleared      Neurosurgery will continue to follow, call with any further questions or concerns.      Subjective Patient seems more groggy this morning likely from medication given for MRI but easily arousable.  He is able to tell me his last name as well as his birthday.  And with choices the current year.  He offers no complaints.      Objective : Patient comfortably lying in bed, NAD.    Temp:  [97.3 °F (36.3 °C)-97.7 °F (36.5 °C)] 97.5 °F (36.4 °C)  HR:  [36-58] 46  BP: (111-162)/(51-87) 154/73  Resp:  [16-30] 21  SpO2:  [95 %-98 %] 97 %  O2 Device: None (Room air)    I/O         04/16 0701  04/17 0700 04/17 0701  04/18 0700 04/18 0701  04/19 0700    P.O. 240 350     I.V. (mL/kg) 57.4 (0.7) 2024.7 (24)     IV Piggyback  400     Total Intake(mL/kg) 297.4 (3.5) 2774.7 (32.9)     Urine (mL/kg/hr) 1180 (0.6) 2475 (1.2)     Drains 2 18     Stool       Total Output 1182 2493     Net -884.6 +281.7            Unmeasured Urine Occurrence 2 x 1 x           General  appearance: alert, appears stated age, cooperative and no distress  Head: Normocephalic, left frontal EVD in place with good waveform.    Eyes: EOMI, PERRL, conjugate gaze  Neck: supple, symmetrical, trachea midline  Lungs: non labored breathing  Heart: Bradycardic  Neurologic:   Mental status: Alert and oriented to person and time.  Able to tell me his last name and birthday.  Able to choose the right year with choices.  Cranial nerves: grossly intact (Cranial nerves II-XII)   Sensory: normal to light touch all extremities x 4  Motor: moving all extremities, able to pick bilateral arms and legs off of bed to command  Reflexes: 2+ and symmetric, no Ilan's or clonus seen     Lab Results: I have reviewed the following results:  Recent Labs     04/17/25  0621 04/18/25  0512   WBC 6.88 10.59*   HGB 13.7 13.9   HCT 39.1 38.8    140*   SODIUM 136 134*   K 4.1 3.7    97   CO2 26 29   BUN 23 18   CREATININE 0.76 0.74   GLUC 187* 240*   MG 2.1 1.9   PHOS 3.3 3.1   AST 11*  --    ALT 33  --    ALB 3.7  --    TBILI 0.50  --    ALKPHOS 44  --        VTE Pharmacologic Prophylaxis: Sequential compression device (Venodyne)  and Heparin

## 2025-04-18 NOTE — PROGRESS NOTES
Progress Note - Oncology-Medical   Name: Alexis Dennison 64 y.o. male I MRN: [id number]  Unit/Bed#: ICU 03 I Date of Admission: 3/29/2025   Date of Service: 4/18/2025 I Hospital Day: 20    Assessment & Plan  Brain tumor (HCC)  Admitted 3/29 with worsening confusion since prior ED presentation and outpatient imaging  Seen at [facility] ED on 3/24 with 3 week history of altered mental status, memory difficulty  CTH 3/24 demonstrated multiple hyperdense ependyma/subependymal nodules  MRI brain 3/26 performed outpatient demonstrating multiple scattered foci of subependymal nodular enhancement with mild hydrocephalus, scattered nodular areas of enhancement in left anterior inferior basal ganglia dn foci of nodular enhancement along anteromedial aspect of left cerebral peduncle and left quirino  CTH 3/29 (this admission)  Stable subependymal nodules and left formamen of De Oliveira region hyperdense lesion with dilation of left lateral ventrical and minimal left to right midline shift  S/p LP 3/31  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   MRI Brain (4/11) showed progression of disease from 3/30 to 4/11 with interval enlargement of the dominant left anterior basal ganglionic mass with greater surrounding vasogenic edema and mass effect and persistent leptomeningeal and intraventricular seeding with obstructive hydrocephalus--current differential lymphoma versus high-grade glioma less likely metastasis  4/13: continued deterioration in mental status, CT Head 4/13 demonstrated left sided obstructive  hydrocephalus from lesion located adjacent to formamen of Monro, similar to CT on 4/3; blood products within occiptial horn of left lateral ventricle similar to MRI 4/11; left anterior basal ganglia mass with surrounding edema, regional mass effect and effacement of left suprasellar cistern, similar to MRI 4/11.  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  Initiation of chemotherapy with high-dose methotrexate today  We will check methotrexate levels daily.   Following with leucovorin rescue Q6h  Daily lab monitoring for TLS - CBC, CMP, Phos, LDH, Uric acid    Encephalopathy  Most likely d/t brain mass, see interim A/P above  Pulmonary nodule  CTA 3/29 with nonspecific 4 mm RLL pulm nodule, recommendation for 3 month follow up  Liver lesion  CTA 3/29 with nonspecific 12mm hypodense right hepatic lesion, recommended MRI abdomen  MRI 3/30 with no suspicious hepatic lesions, demonstrated simple right hepatic lobe cyst  Primary CNS lymphoma  See management in brain tumor     Subjective   This morning Mr. Dennison was sitting in chair in no acute distress. Was mildly somnolent, unfortunately MRI was  unable to obtained due to agitation he received ativan yesterday with explains pt mental status at this moment.     Objective :  Temp:  [97.3 °F (36.3 °C)-97.7 °F (36.5 °C)] 97.5 °F (36.4 °C)  HR:  [42-58] 46  BP: (111-162)/(51-87) 154/73  Resp:  [18-30] 21  SpO2:  [95 %-98 %] 97 %  O2 Device: None (Room air)      Physical Exam  Constitutional:       Appearance: He is normal weight.   HENT:      Head: Normocephalic.      Comments:  EVD in place     Mouth/Throat:      Mouth: Mucous membranes are moist.   Eyes:      Pupils: Pupils are equal, round, and reactive to light.   Cardiovascular:      Rate and Rhythm: Regular rhythm. Bradycardia present.   Pulmonary:      Effort: Pulmonary effort is normal.   Abdominal:      Palpations: Abdomen is soft.   Musculoskeletal:         General: Normal range of motion.      Cervical back: Normal range of motion.   Skin:     General: Skin is warm.   Neurological:      Mental Status: He is lethargic and confused.           Lab Results: I have reviewed the following results:  Lab Results   Component Value Date    K 3.7 04/18/2025    CL 97 04/18/2025    CO2 29 04/18/2025    BUN 18 04/18/2025    CREATININE 0.74 04/18/2025    GLUF 131 (H) 02/22/2025    CALCIUM 9.1 04/18/2025    AST 11 (L) 04/17/2025    ALT 33 04/17/2025    ALKPHOS 44 04/17/2025    EGFR 96 04/18/2025     Lab Results   Component Value Date    WBC 10.59 (H) 04/18/2025    HGB 13.9 04/18/2025    HCT 38.8 04/18/2025    MCV 86 04/18/2025     (L) 04/18/2025     Lab Results   Component Value Date    NEUTROABS 5.54 04/17/2025

## 2025-04-18 NOTE — CASE MANAGEMENT
Case Management Discharge Planning Note    Patient name Alexis Dennison  Location ICU 03/ICU 03 MRN [id number]  : 1960 Date 2025       Current Admission Date: 3/29/2025  Current Admission Diagnosis:Brain tumor (HCC)   Patient Active Problem List    Diagnosis Date Noted Date Diagnosed    Goals of care, counseling/discussion 2025     Palliative care encounter 2025     Primary CNS lymphoma 2025     Encephalopathy 2025     Constipation 2025     Ambulatory dysfunction 2025     Thyroid nodule 2025     Pulmonary nodule 2025     Liver lesion 2025     Brain tumor (HCC) 2025     Type 2 diabetes mellitus with hyperglycemia, without long-term current use of insulin (HCC) 2022     HTN, goal below 130/80 2022     Mixed hyperlipidemia 2022     Vitamin D deficiency 2022     NAFLD (nonalcoholic fatty liver disease) 2022       LOS (days): 20  Geometric Mean LOS (GMLOS) (days): 11.1  Days to GMLOS:-8.6     OBJECTIVE:  Risk of Unplanned Readmission Score: 29.7         Current admission status: Inpatient   Preferred Pharmacy:   [facility]/pharmacy #[id number] - [place], [place] - [address] [address]  [address] [address]  Phone: [phone] Fax: [phone]    [facility] - [place], [place] - [address]  [address]  [facility] [address]  Phone: [phone] Fax: [phone]    Primary Care Provider: PATI Mckeon    Primary Insurance: BLUE CROSS  Secondary Insurance: CAPITAL    DISCHARGE DETAILS:                                                                                                 Additional Comments: POD#4 brain biopsy/EVD, Dx lymphoma. Started chemotherapy today via PICC. Is confused and groggy. EVD clamped today. On Bicarb gtt and Decadron IV.  Wife visits very early in AM. Pt has referrals to  [facility] and [facility] post chemotheapy.

## 2025-04-18 NOTE — ASSESSMENT & PLAN NOTE
Admitted 3/29 with worsening confusion since prior ED presentation and outpatient imaging  Seen at [facility] ED on 3/24 with 3 week history of altered mental status, memory difficulty  CTH 3/24 demonstrated multiple hyperdense ependyma/subependymal nodules  MRI brain 3/26 performed outpatient demonstrating multiple scattered foci of subependymal nodular enhancement with mild hydrocephalus, scattered nodular areas of enhancement in left anterior inferior basal ganglia dn foci of nodular enhancement along anteromedial aspect of left cerebral peduncle and left quirino  CTH 3/29 (this admission)  Stable subependymal nodules and left formamen of De Oliveira region hyperdense lesion with dilation of left lateral ventrical and minimal left to right midline shift  S/p LP 3/31  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   MRI Brain (4/11) showed progression of disease from 3/30 to 4/11 with interval enlargement of the dominant left anterior basal ganglionic mass with greater surrounding vasogenic edema and mass effect and persistent leptomeningeal and intraventricular seeding with obstructive hydrocephalus--current differential lymphoma versus high-grade glioma less likely metastasis  4/13: continued deterioration in mental status, CT Head 4/13 demonstrated left sided obstructive hydrocephalus from lesion located adjacent to formamen of Monro, similar to CT on 4/3; blood products within occiptial horn of left lateral ventricle similar to MRI 4/11; left anterior basal ganglia mass with surrounding edema,  regional mass effect and effacement of left suprasellar cistern, similar to MRI 4/11.  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  Initiation of chemotherapy with high-dose methotrexate today  We will check methotrexate levels daily.   Following with leucovorin rescue Q6h  Daily lab monitoring for TLS - CBC, CMP, Phos, LDH, Uric acid

## 2025-04-18 NOTE — ASSESSMENT & PLAN NOTE
Lab Results   Component Value Date    HGBA1C 6.6 (H) 02/22/2025       Recent Labs     04/17/25  1211 04/17/25  1737 04/17/25  2355 04/18/25  0601   POCGLU 275* 216* 232* 229*         Blood Sugar Average: Last 72 hrs:  (P) [id number]

## 2025-04-18 NOTE — PLAN OF CARE
Problem: PHYSICAL THERAPY ADULT  Goal: Performs mobility at highest level of function for planned discharge setting.  See evaluation for individualized goals.  Description: Treatment/Interventions: LE strengthening/ROM, Functional transfer training, Elevations, Therapeutic exercise, Cognitive reorientation, Endurance training, Bed mobility, Equipment eval/education, Patient/family training, Gait training, Spoke to nursing, Spoke to case management, OT, Continued evaluation, Compensatory technique education, Family          See flowsheet documentation for full assessment, interventions and recommendations.  Outcome: Not Progressing

## 2025-04-18 NOTE — OCCUPATIONAL THERAPY NOTE
Occupational Therapy Progress Note     Patient Name: Alexis Dennison  Today's Date: 4/18/2025  Problem List  Principal Problem:    Brain tumor (HCC)  Active Problems:    Type 2 diabetes mellitus with hyperglycemia, without long-term current use of insulin (HCC)    HTN, goal below 130/80    Mixed hyperlipidemia    Thyroid nodule    Pulmonary nodule    Liver lesion    Constipation    Ambulatory dysfunction    Encephalopathy    Goals of care, counseling/discussion    Palliative care encounter    Primary CNS lymphoma          04/18/25 0850   OT Last Visit   OT Visit Date 04/18/25   Note Type   Note Type Treatment   Pain Assessment   Pain Assessment Tool FLACC   Pain Rating: FLACC (Rest) - Face 0   Pain Rating: FLACC (Rest) - Legs 0   Pain Rating: FLACC (Rest) - Activity 0   Pain Rating: FLACC (Rest) - Cry 0   Pain Rating: FLACC (Rest) - Consolability 0   Score: FLACC (Rest) 0   Pain Rating: FLACC (Activity) - Face 0   Pain Rating: FLACC (Activity) - Legs 1   Pain Rating: FLACC (Activity) - Activity 1   Pain Rating: FLACC (Activity) - Cry 0   Pain Rating: FLACC (Activity) - Consolability 1   Score: FLACC (Activity) 3   Restrictions/Precautions   Weight Bearing Precautions Per Order No   Other Precautions Cognitive;Chair Alarm;Bed Alarm;Multiple lines;O2;Fall Risk;Pain   Lifestyle   Autonomy I with ADL's/iaDL's, no AD with functional mobility +drives   Reciprocal Relationships spouse, children   Service to Others full time employement   Intrinsic Gratification reading   ADL   Eating Assistance 2  Maximal Assistance   Grooming Assistance 2  Maximal Assistance   UB Bathing Assistance 2  Maximal Assistance   LB Bathing Assistance 2  Maximal Assistance   UB Dressing Assistance 2  Maximal Assistance   LB Dressing Assistance 2  Maximal Assistance   Toileting Assistance  Unable to assess   Bed Mobility   Supine to Sit 3  Moderate assistance   Transfers   Sit to Stand 3  Moderate assistance   Additional items Assist x 2   Stand to  Sit 3  Moderate assistance   Additional items Assist x 2   Functional Mobility   Functional Mobility 3  Moderate assistance   Additional Comments x2   Additional items Hand hold assistance   Subjective   Subjective minimal verbalizations   Cognition   Overall Cognitive Status Impaired   Arousal/Participation Arousable;Persistent stimuli required;Poorly responsive  (nursing reports pt sedated over night to attempt to complete MRI w/o success)   Attention Difficulty attending to directions   Orientation Level Oriented to person;Disoriented to place;Disoriented to time;Disoriented to situation   Memory Decreased short term memory;Decreased recall of recent events;Decreased recall of precautions   Following Commands Follows one step commands inconsistently   Activity Tolerance   Activity Tolerance Patient limited by fatigue;Patient limited by pain;Treatment limited secondary to medical complications (Comment)   Assessment   Assessment Pt seen for OT session focusing on self care, fuctional mob/transfers, dynamic balance/safety, cognition and overall tolerance to activity - per RN pt was sedated over night for MRI and is more sleepy this am- required frequent/persistent stim to keep eyes open - functionally requires max assist for adls and mod a x 2 for transfers 2* same - Continue to recommend Level I resources post d/c - OT to continue to follow to address goals as stated on eval   Plan   Treatment Interventions ADL retraining;Functional transfer training;Endurance training;Cognitive reorientation;Patient/family training;Equipment evaluation/education;Compensatory technique education;Activityengagement   Goal Expiration Date 04/29/25   OT Treatment Day 4   OT Frequency 3-5x/wk   Discharge Recommendation   Rehab Resource Intensity Level, OT I (Maximum Resource Intensity)   AM-PAC Daily Activity Inpatient   Lower Body Dressing 2   Bathing 2   Toileting 2   Upper Body Dressing 2   Grooming 2   Eating 2   Daily Activity  Raw Score 12   Daily Activity Standardized Score (Calc for Raw Score >=11) 30.6   AM-PAC Applied Cognition Inpatient   Following a Speech/Presentation 1   Understanding Ordinary Conversation 2   Taking Medications 1   Remembering Where Things Are Placed or Put Away 2   Remembering List of 4-5 Errands 1   Taking Care of Complicated Tasks 1   Applied Cognition Raw Score 8   Applied Cognition Standardized Score 19.32   Barthel Index   Feeding 5   Bathing 0   Grooming Score 0   Dressing Score 0   Bladder Score 0   Bowels Score 0   Toilet Use Score 5   Transfers (Bed/Chair) Score 5   Mobility (Level Surface) Score 0   Stairs Score 0   Barthel Index Score 15   Modified Real Scale   Modified [place] Scale 4   End of Consult   Education Provided Yes   Patient Position at End of Consult Bedside chair;Bed/Chair alarm activated;All needs within reach   Nurse Communication Nurse aware of consult       The patient's raw score on the AM-PAC Daily Activity Inpatient Short Form is 12. A raw score of less than 19 suggests the patient may benefit from discharge to post-acute rehabilitation services. Please refer to the recommendation of the Occupational Therapist for safe discharge planning.      Documentation Completed By:    BERT Shi/L  [employer] Certified - [id number]

## 2025-04-19 ENCOUNTER — APPOINTMENT (INPATIENT)
Dept: RADIOLOGY | Facility: HOSPITAL | Age: 65
DRG: 820 | End: 2025-04-19
Payer: COMMERCIAL

## 2025-04-19 LAB
ANION GAP SERPL CALCULATED.3IONS-SCNC: 7 MMOL/L (ref 4–13)
ANION GAP SERPL CALCULATED.3IONS-SCNC: 8 MMOL/L (ref 4–13)
BACTERIA UR QL AUTO: ABNORMAL /HPF
BACTERIA UR QL AUTO: NORMAL /HPF
BACTERIA UR QL AUTO: NORMAL /HPF
BASOPHILS # BLD AUTO: 0.01 THOUSANDS/ÂΜL (ref 0–0.1)
BASOPHILS NFR BLD AUTO: 0 % (ref 0–1)
BILIRUB UR QL STRIP: NEGATIVE
BUN SERPL-MCNC: 19 MG/DL (ref 5–25)
BUN SERPL-MCNC: 22 MG/DL (ref 5–25)
CALCIUM SERPL-MCNC: 8.3 MG/DL (ref 8.4–10.2)
CALCIUM SERPL-MCNC: 8.8 MG/DL (ref 8.4–10.2)
CHLORIDE SERPL-SCNC: 92 MMOL/L (ref 96–108)
CHLORIDE SERPL-SCNC: 95 MMOL/L (ref 96–108)
CLARITY UR: CLEAR
CO2 SERPL-SCNC: 30 MMOL/L (ref 21–32)
CO2 SERPL-SCNC: 30 MMOL/L (ref 21–32)
COLOR UR: ABNORMAL
CREAT SERPL-MCNC: 0.86 MG/DL (ref 0.6–1.3)
CREAT SERPL-MCNC: 0.87 MG/DL (ref 0.6–1.3)
EOSINOPHIL # BLD AUTO: 0 THOUSAND/ÂΜL (ref 0–0.61)
EOSINOPHIL NFR BLD AUTO: 0 % (ref 0–6)
ERYTHROCYTE [DISTWIDTH] IN BLOOD BY AUTOMATED COUNT: 11.7 % (ref 11.6–15.1)
GFR SERPL CREATININE-BSD FRML MDRD: 90 ML/MIN/1.73SQ M
GFR SERPL CREATININE-BSD FRML MDRD: 90 ML/MIN/1.73SQ M
GLUCOSE SERPL-MCNC: 146 MG/DL (ref 65–140)
GLUCOSE SERPL-MCNC: 174 MG/DL (ref 65–140)
GLUCOSE SERPL-MCNC: 181 MG/DL (ref 65–140)
GLUCOSE SERPL-MCNC: 190 MG/DL (ref 65–140)
GLUCOSE SERPL-MCNC: 198 MG/DL (ref 65–140)
GLUCOSE SERPL-MCNC: 255 MG/DL (ref 65–140)
GLUCOSE SERPL-MCNC: 305 MG/DL (ref 65–140)
GLUCOSE UR STRIP-MCNC: ABNORMAL MG/DL
GLUCOSE UR STRIP-MCNC: ABNORMAL MG/DL
GLUCOSE UR STRIP-MCNC: NEGATIVE MG/DL
GLUCOSE UR STRIP-MCNC: NEGATIVE MG/DL
HBV CORE AB SER QL: NORMAL
HBV CORE IGM SER QL: NORMAL
HBV E AG SERPL QL IA: NORMAL
HBV SURFACE AB SER-ACNC: 5.9 MIU/ML
HCT VFR BLD AUTO: 36 % (ref 36.5–49.3)
HGB BLD-MCNC: 13.5 G/DL (ref 12–17)
HGB UR QL STRIP.AUTO: ABNORMAL
HGB UR QL STRIP.AUTO: NEGATIVE
IMM GRANULOCYTES # BLD AUTO: 0.11 THOUSAND/UL (ref 0–0.2)
IMM GRANULOCYTES NFR BLD AUTO: 1 % (ref 0–2)
KETONES UR STRIP-MCNC: NEGATIVE MG/DL
LDH SERPL-CCNC: 227 U/L (ref 140–271)
LEUKOCYTE ESTERASE UR QL STRIP: NEGATIVE
LYMPHOCYTES # BLD AUTO: 0.49 THOUSANDS/ÂΜL (ref 0.6–4.47)
LYMPHOCYTES NFR BLD AUTO: 5 % (ref 14–44)
MAGNESIUM SERPL-MCNC: 2.2 MG/DL (ref 1.9–2.7)
MCH RBC QN AUTO: 31.5 PG (ref 26.8–34.3)
MCHC RBC AUTO-ENTMCNC: 37.5 G/DL (ref 31.4–37.4)
MCV RBC AUTO: 84 FL (ref 82–98)
MONOCYTES # BLD AUTO: 0.52 THOUSAND/ÂΜL (ref 0.17–1.22)
MONOCYTES NFR BLD AUTO: 5 % (ref 4–12)
MTX SERPL-SCNC: 126 UMOL/L
NEUTROPHILS # BLD AUTO: 9.16 THOUSANDS/ÂΜL (ref 1.85–7.62)
NEUTS SEG NFR BLD AUTO: 89 % (ref 43–75)
NITRITE UR QL STRIP: NEGATIVE
NON-SQ EPI CELLS URNS QL MICRO: ABNORMAL /HPF
NON-SQ EPI CELLS URNS QL MICRO: NORMAL /HPF
NON-SQ EPI CELLS URNS QL MICRO: NORMAL /HPF
NRBC BLD AUTO-RTO: 0 /100 WBCS
PH UR STRIP.AUTO: 7.5 [PH]
PH UR STRIP.AUTO: 8 [PH]
PHOSPHATE SERPL-MCNC: 3.7 MG/DL (ref 2.3–4.1)
PLATELET # BLD AUTO: 87 THOUSANDS/UL (ref 149–390)
PLATELET BLD QL SMEAR: ABNORMAL
PMV BLD AUTO: 10.9 FL (ref 8.9–12.7)
POTASSIUM SERPL-SCNC: 3.9 MMOL/L (ref 3.5–5.3)
POTASSIUM SERPL-SCNC: 4.9 MMOL/L (ref 3.5–5.3)
PROT UR STRIP-MCNC: ABNORMAL MG/DL
PROT UR STRIP-MCNC: NEGATIVE MG/DL
RBC # BLD AUTO: 4.28 MILLION/UL (ref 3.88–5.62)
RBC #/AREA URNS AUTO: ABNORMAL /HPF
RBC #/AREA URNS AUTO: NORMAL /HPF
RBC #/AREA URNS AUTO: NORMAL /HPF
RBC MORPH BLD: NORMAL
SODIUM SERPL-SCNC: 129 MMOL/L (ref 135–147)
SODIUM SERPL-SCNC: 133 MMOL/L (ref 135–147)
SP GR UR STRIP.AUTO: 1.01 (ref 1–1.03)
URATE SERPL-MCNC: 3 MG/DL (ref 3.5–8.5)
UROBILINOGEN UR STRIP-ACNC: <2 MG/DL
WBC # BLD AUTO: 10.29 THOUSAND/UL (ref 4.31–10.16)
WBC #/AREA URNS AUTO: ABNORMAL /HPF
WBC #/AREA URNS AUTO: NORMAL /HPF
WBC #/AREA URNS AUTO: NORMAL /HPF

## 2025-04-19 PROCEDURE — 82948 REAGENT STRIP/BLOOD GLUCOSE: CPT

## 2025-04-19 PROCEDURE — 86706 HEP B SURFACE ANTIBODY: CPT | Performed by: PHYSICIAN ASSISTANT

## 2025-04-19 PROCEDURE — 80204 DRUG ASSAY METHOTREXATE: CPT | Performed by: INTERNAL MEDICINE

## 2025-04-19 PROCEDURE — 84100 ASSAY OF PHOSPHORUS: CPT

## 2025-04-19 PROCEDURE — 85025 COMPLETE CBC W/AUTO DIFF WBC: CPT

## 2025-04-19 PROCEDURE — 81001 URINALYSIS AUTO W/SCOPE: CPT | Performed by: INTERNAL MEDICINE

## 2025-04-19 PROCEDURE — 87350 HEPATITIS BE AG IA: CPT | Performed by: PHYSICIAN ASSISTANT

## 2025-04-19 PROCEDURE — 72156 MRI NECK SPINE W/O & W/DYE: CPT

## 2025-04-19 PROCEDURE — 31500 INSERT EMERGENCY AIRWAY: CPT | Performed by: ANESTHESIOLOGY

## 2025-04-19 PROCEDURE — 83615 LACTATE (LD) (LDH) ENZYME: CPT

## 2025-04-19 PROCEDURE — 99233 SBSQ HOSP IP/OBS HIGH 50: CPT | Performed by: INTERNAL MEDICINE

## 2025-04-19 PROCEDURE — 12020 TX SUPFC WND DEHSN SMPL CLSR: CPT | Performed by: ANESTHESIOLOGY

## 2025-04-19 PROCEDURE — 5A1935Z RESPIRATORY VENTILATION, LESS THAN 24 CONSECUTIVE HOURS: ICD-10-PCS | Performed by: ANESTHESIOLOGY

## 2025-04-19 PROCEDURE — 0HQ0XZZ REPAIR SCALP SKIN, EXTERNAL APPROACH: ICD-10-PCS | Performed by: ANESTHESIOLOGY

## 2025-04-19 PROCEDURE — 80048 BASIC METABOLIC PNL TOTAL CA: CPT

## 2025-04-19 PROCEDURE — 86705 HEP B CORE ANTIBODY IGM: CPT | Performed by: PHYSICIAN ASSISTANT

## 2025-04-19 PROCEDURE — 31500 INSERT EMERGENCY AIRWAY: CPT

## 2025-04-19 PROCEDURE — 99024 POSTOP FOLLOW-UP VISIT: CPT | Performed by: SPECIALIST

## 2025-04-19 PROCEDURE — 94002 VENT MGMT INPAT INIT DAY: CPT

## 2025-04-19 PROCEDURE — 84550 ASSAY OF BLOOD/URIC ACID: CPT

## 2025-04-19 PROCEDURE — 83735 ASSAY OF MAGNESIUM: CPT

## 2025-04-19 PROCEDURE — 86704 HEP B CORE ANTIBODY TOTAL: CPT | Performed by: PHYSICIAN ASSISTANT

## 2025-04-19 PROCEDURE — 99233 SBSQ HOSP IP/OBS HIGH 50: CPT | Performed by: ANESTHESIOLOGY

## 2025-04-19 PROCEDURE — 94760 N-INVAS EAR/PLS OXIMETRY 1: CPT

## 2025-04-19 PROCEDURE — 80204 DRUG ASSAY METHOTREXATE: CPT

## 2025-04-19 PROCEDURE — 71045 X-RAY EXAM CHEST 1 VIEW: CPT

## 2025-04-19 PROCEDURE — 12001 RPR S/N/AX/GEN/TRNK 2.5CM/<: CPT | Performed by: ANESTHESIOLOGY

## 2025-04-19 PROCEDURE — NC001 PR NO CHARGE: Performed by: ANESTHESIOLOGY

## 2025-04-19 PROCEDURE — 0BH17EZ INSERTION OF ENDOTRACHEAL AIRWAY INTO TRACHEA, VIA NATURAL OR ARTIFICIAL OPENING: ICD-10-PCS | Performed by: ANESTHESIOLOGY

## 2025-04-19 PROCEDURE — 72158 MRI LUMBAR SPINE W/O & W/DYE: CPT

## 2025-04-19 PROCEDURE — 72157 MRI CHEST SPINE W/O & W/DYE: CPT

## 2025-04-19 RX ORDER — LEUCOVORIN CALCIUM 25 MG/1
25 TABLET ORAL EVERY 6 HOURS
Status: DISCONTINUED | OUTPATIENT
Start: 2025-04-19 | End: 2025-04-19

## 2025-04-19 RX ORDER — PROPOFOL 10 MG/ML
5-50 INJECTION, EMULSION INTRAVENOUS
Status: DISCONTINUED | OUTPATIENT
Start: 2025-04-19 | End: 2025-04-20

## 2025-04-19 RX ORDER — LEUCOVORIN CALCIUM 25 MG/1
25 TABLET ORAL EVERY 6 HOURS
Status: COMPLETED | OUTPATIENT
Start: 2025-04-19 | End: 2025-04-21

## 2025-04-19 RX ORDER — FENTANYL CITRATE 50 UG/ML
50 INJECTION, SOLUTION INTRAMUSCULAR; INTRAVENOUS
Refills: 0 | Status: DISCONTINUED | OUTPATIENT
Start: 2025-04-19 | End: 2025-04-20

## 2025-04-19 RX ORDER — FENTANYL CITRATE 50 UG/ML
50 INJECTION, SOLUTION INTRAMUSCULAR; INTRAVENOUS ONCE
Refills: 0 | Status: COMPLETED | OUTPATIENT
Start: 2025-04-19 | End: 2025-04-19

## 2025-04-19 RX ORDER — METOCLOPRAMIDE HYDROCHLORIDE 5 MG/ML
10 INJECTION INTRAMUSCULAR; INTRAVENOUS ONCE
Status: COMPLETED | OUTPATIENT
Start: 2025-04-19 | End: 2025-04-19

## 2025-04-19 RX ORDER — FENTANYL CITRATE 50 UG/ML
INJECTION, SOLUTION INTRAMUSCULAR; INTRAVENOUS
Status: COMPLETED
Start: 2025-04-19 | End: 2025-04-19

## 2025-04-19 RX ORDER — FENTANYL CITRATE 50 UG/ML
100 INJECTION, SOLUTION INTRAMUSCULAR; INTRAVENOUS ONCE
Refills: 0 | Status: CANCELLED | OUTPATIENT
Start: 2025-04-19 | End: 2025-04-19

## 2025-04-19 RX ORDER — PROPOFOL 10 MG/ML
72 INJECTION, EMULSION INTRAVENOUS ONCE
Status: COMPLETED | OUTPATIENT
Start: 2025-04-19 | End: 2025-04-19

## 2025-04-19 RX ORDER — SUCCINYLCHOLINE/SOD CL,ISO/PF 100 MG/5ML
1 SYRINGE (ML) INTRAVENOUS ONCE
Status: COMPLETED | OUTPATIENT
Start: 2025-04-19 | End: 2025-04-19

## 2025-04-19 RX ADMIN — DEXAMETHASONE SODIUM PHOSPHATE 4 MG: 4 INJECTION INTRA-ARTICULAR; INTRALESIONAL; INTRAMUSCULAR; INTRAVENOUS; SOFT TISSUE at 23:00

## 2025-04-19 RX ADMIN — PROPOFOL 50 MCG/KG/MIN: 10 INJECTION, EMULSION INTRAVENOUS at 17:00

## 2025-04-19 RX ADMIN — ALLOPURINOL 300 MG: 300 TABLET ORAL at 10:21

## 2025-04-19 RX ADMIN — PROPOFOL 30 MCG/KG/MIN: 10 INJECTION, EMULSION INTRAVENOUS at 21:31

## 2025-04-19 RX ADMIN — INSULIN LISPRO 5 UNITS: 100 INJECTION, SOLUTION INTRAVENOUS; SUBCUTANEOUS at 08:00

## 2025-04-19 RX ADMIN — LEVETIRACETAM 500 MG: 500 TABLET, FILM COATED ORAL at 09:51

## 2025-04-19 RX ADMIN — LORAZEPAM 0.5 MG: 2 INJECTION INTRAMUSCULAR; INTRAVENOUS at 00:17

## 2025-04-19 RX ADMIN — DEXAMETHASONE SODIUM PHOSPHATE 4 MG: 4 INJECTION INTRA-ARTICULAR; INTRALESIONAL; INTRAMUSCULAR; INTRAVENOUS; SOFT TISSUE at 14:32

## 2025-04-19 RX ADMIN — Medication 72 MG: at 17:00

## 2025-04-19 RX ADMIN — CHLORHEXIDINE GLUCONATE 0.12% ORAL RINSE 15 ML: 1.2 LIQUID ORAL at 09:51

## 2025-04-19 RX ADMIN — ATORVASTATIN CALCIUM 20 MG: 20 TABLET, FILM COATED ORAL at 09:51

## 2025-04-19 RX ADMIN — DEXAMETHASONE SODIUM PHOSPHATE 4 MG: 4 INJECTION INTRA-ARTICULAR; INTRALESIONAL; INTRAMUSCULAR; INTRAVENOUS; SOFT TISSUE at 17:40

## 2025-04-19 RX ADMIN — LEUCOVORIN CALCIUM 25 MG: 25 TABLET ORAL at 23:00

## 2025-04-19 RX ADMIN — SODIUM BICARBONATE 150 ML/HR: 84 INJECTION, SOLUTION INTRAVENOUS at 23:00

## 2025-04-19 RX ADMIN — DEXAMETHASONE SODIUM PHOSPHATE 4 MG: 4 INJECTION INTRA-ARTICULAR; INTRALESIONAL; INTRAMUSCULAR; INTRAVENOUS; SOFT TISSUE at 05:44

## 2025-04-19 RX ADMIN — BISACODYL 10 MG: 10 SUPPOSITORY RECTAL at 09:51

## 2025-04-19 RX ADMIN — SODIUM BICARBONATE 150 ML/HR: 84 INJECTION, SOLUTION INTRAVENOUS at 05:26

## 2025-04-19 RX ADMIN — METOCLOPRAMIDE HYDROCHLORIDE 10 MG: 5 INJECTION INTRAMUSCULAR; INTRAVENOUS at 14:32

## 2025-04-19 RX ADMIN — PANTOPRAZOLE SODIUM 40 MG: 40 TABLET, DELAYED RELEASE ORAL at 05:44

## 2025-04-19 RX ADMIN — LEUCOVORIN CALCIUM 25 MG: 50 INJECTION, POWDER, LYOPHILIZED, FOR SOLUTION INTRAMUSCULAR; INTRAVENOUS at 14:34

## 2025-04-19 RX ADMIN — SODIUM BICARBONATE 150 ML/HR: 84 INJECTION, SOLUTION INTRAVENOUS at 10:34

## 2025-04-19 RX ADMIN — PROPOFOL 72 MG: 10 INJECTION, EMULSION INTRAVENOUS at 17:00

## 2025-04-19 RX ADMIN — INSULIN LISPRO 20 UNITS: 100 INJECTION, SOLUTION INTRAVENOUS; SUBCUTANEOUS at 17:00

## 2025-04-19 RX ADMIN — LEUCOVORIN CALCIUM 25 MG: 25 TABLET ORAL at 17:55

## 2025-04-19 RX ADMIN — FENTANYL CITRATE 50 MCG: 50 INJECTION INTRAMUSCULAR; INTRAVENOUS at 17:15

## 2025-04-19 RX ADMIN — SODIUM CHLORIDE 1000 ML: 0.9 INJECTION, SOLUTION INTRAVENOUS at 05:27

## 2025-04-19 RX ADMIN — SENNOSIDES 17.2 MG: 8.6 TABLET, FILM COATED ORAL at 22:02

## 2025-04-19 RX ADMIN — POLYETHYLENE GLYCOL 3350 17 G: 17 POWDER, FOR SOLUTION ORAL at 09:51

## 2025-04-19 RX ADMIN — METOCLOPRAMIDE 10 MG: 5 INJECTION, SOLUTION INTRAMUSCULAR; INTRAVENOUS at 06:48

## 2025-04-19 RX ADMIN — CHLORHEXIDINE GLUCONATE 0.12% ORAL RINSE 15 ML: 1.2 LIQUID ORAL at 20:54

## 2025-04-19 RX ADMIN — DEXAMETHASONE SODIUM PHOSPHATE 4 MG: 4 INJECTION INTRA-ARTICULAR; INTRALESIONAL; INTRAMUSCULAR; INTRAVENOUS; SOFT TISSUE at 00:17

## 2025-04-19 RX ADMIN — FENTANYL CITRATE 50 MCG: 50 INJECTION, SOLUTION INTRAMUSCULAR; INTRAVENOUS at 17:15

## 2025-04-19 RX ADMIN — LEVETIRACETAM 500 MG: 500 TABLET, FILM COATED ORAL at 20:55

## 2025-04-19 RX ADMIN — INSULIN LISPRO 10 UNITS: 100 INJECTION, SOLUTION INTRAVENOUS; SUBCUTANEOUS at 12:15

## 2025-04-19 NOTE — PROGRESS NOTES
Progress Note - Oncology-Medical   Name: Alexis Dennison 64 y.o. male I MRN: [id number]  Unit/Bed#: ICU 03 I Date of Admission: 3/29/2025   Date of Service: 4/19/2025 I Hospital Day: 21    Assessment & Plan  Brain tumor (HCC)  Admitted 3/29 with worsening confusion since prior ED presentation and outpatient imaging  Seen at [facility] ED on 3/24 with 3 week history of altered mental status, memory difficulty  CTH 3/24 demonstrated multiple hyperdense ependyma/subependymal nodules  MRI brain 3/26 performed outpatient demonstrating multiple scattered foci of subependymal nodular enhancement with mild hydrocephalus, scattered nodular areas of enhancement in left anterior inferior basal ganglia dn foci of nodular enhancement along anteromedial aspect of left cerebral peduncle and left quirino  CTH 3/29 (this admission)  Stable subependymal nodules and left formamen of De Oliveira region hyperdense lesion with dilation of left lateral ventrical and minimal left to right midline shift  S/p LP 3/31  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   MRI Brain (4/11) showed progression of disease from 3/30 to 4/11 with interval enlargement of the dominant left anterior basal ganglionic mass with greater surrounding vasogenic edema and mass effect and persistent leptomeningeal and intraventricular seeding with obstructive hydrocephalus--current differential lymphoma versus high-grade glioma less likely metastasis  4/13: continued deterioration in mental status, CT Head 4/13 demonstrated left sided obstructive  hydrocephalus from lesion located adjacent to formamen of Monro, similar to CT on 4/3; blood products within occiptial horn of left lateral ventricle similar to MRI 4/11; left anterior basal ganglia mass with surrounding edema, regional mass effect and effacement of left suprasellar cistern, similar to MRI 4/11.  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  Initiation of chemotherapy with high-dose methotrexate. Yesterday 4/17  Methotrexate levels at 1800 today, and daily.   Following with leucovorin rescue Q6h  Daily lab monitoring for TLS - CBC, CMP, Phos, LDH, Uric acid    Encephalopathy  Most likely d/t brain mass, see interim A/P above  Pulmonary nodule  CTA 3/29 with nonspecific 4 mm RLL pulm nodule, recommendation for 3 month follow up  Liver lesion  CTA 3/29 with nonspecific 12mm hypodense right hepatic lesion, recommended MRI abdomen  MRI 3/30 with no suspicious hepatic lesions, demonstrated simple right hepatic lobe cyst  Primary CNS lymphoma  See management in brain tumor     Subjective   Mr. Dennison was sitting in chair in no distress. no acute overnight events. We will continue  chemotherapy as planned, Family at bedside updated.     Objective :  Temp:  [98 °F (36.7 °C)-98.2 °F (36.8 °C)] 98.2 °F (36.8 °C)  HR:  [38-60] 52  BP: (107-156)/(49-84) 123/65  Resp:  [16-33] 21  SpO2:  [95 %-100 %] 98 %  O2 Device: None (Room air)      Physical Exam  Constitutional:       General: He is not in acute distress.     Appearance: He is normal weight.   HENT:      Head: Normocephalic.      Comments: EVD removed.      Nose: Nose normal.   Eyes:      Pupils: Pupils are equal, round, and reactive to light.   Cardiovascular:      Rate and Rhythm: Bradycardia present.      Pulses: Normal pulses.   Pulmonary:      Effort: Pulmonary effort is normal.   Abdominal:      Palpations: Abdomen is soft.   Musculoskeletal:         General: Normal range of motion.   Neurological:      Mental Status: He is confused.         Lab Results: I have reviewed the following results:  Lab Results   Component Value Date    K 4.9 04/19/2025    CL 95 (L) 04/19/2025    CO2 30 04/19/2025    BUN 19 04/19/2025    CREATININE 0.87 04/19/2025    GLUF 131 (H) 02/22/2025    CALCIUM 8.8 04/19/2025    AST 11 (L) 04/17/2025    ALT 33 04/17/2025    ALKPHOS 44 04/17/2025    EGFR 90 04/19/2025     Lab Results   Component Value Date    WBC 10.29 (H) 04/19/2025    HGB 13.5 04/19/2025    HCT 36.0 (L) 04/19/2025    MCV 84 04/19/2025    PLT 87 (L) 04/19/2025     Lab Results   Component Value Date    NEUTROABS 9.16 (H) 04/19/2025

## 2025-04-19 NOTE — PROGRESS NOTES
Progress Note - Neurosurgery   Name: Alexis Dennison 64 y.o. male I MRN: [id number]  Unit/Bed#: ICU 03 I Date of Admission: 3/29/2025   Date of Service: 2025 I Hospital Day: 21     Assessment & Plan  Brain tumor (HCC)  POD#5 - s/p L frontal endoscopic biopsy of ventricular mass and replacement of EVD  (Causey, 25)  Subependymal lesions with large left basal ganglia suspicious for lymphoma.  P/w confusion, short term memory loss.  Patient had multiple nondiagnostic LPs and hematology/oncology was requesting a biopsy to confirm.   - patient had worsening mental status change and CT head demonstrated progressive ventriculomegaly and likely trapped left lateral ventricle, ultimately a left frontal EVD was placed.   - patient self extubated.  Preliminary pathology, small round blue cell tumor; differential Dx includes lymphoma   EVD replaced during surgery on     Imagin/14 CTH: Left frontal approach ventriculostomy catheter terminating near the foramen of Monro with increased left frontal lobe pneumocephalus and gas within the left frontal horn since prior study. Slightly increased small amount of layering intraventricular hemorrhage within the left occipital horn. Improved hydrocephalus primarily of the left lateral ventricle since prior study.Stable hyperdense mass centered within the left basal ganglia with surrounding vasogenic edema and mild rightward midline shift    Plan:   Continue to closely monitor neuro exam   Frequent neuro checks per primary team   Repeat STAT CTH with any acute decline in GCS > 2pts or more in 1hr   Maintain normotensive BP goals, SBP < 160, MAP > 65   L frontal EVD in place (replaced on  in OR)   Currently open @ 20. Clamped yesterday. ICP's remain less than 20.   Monitor output and ICP closely.  ICP -1-11, ICP 4 while in room  If patient fails over the weekend reopening EVD and drop to 15  Plan for repeat MRI brain w/wo, BT protocol on  to assess response  to treatment/if need for VPS   Continue local wound care to incision   Will leave dressing in place given pt will reach up and touch his head   Final pathology: primary CNS non-Hodgkin's lymphoma, diffuse large B-cell lymphoma   Oncology following closely   Recommend completing staging for his lymphoproliferative disease w/ MRI imaging of the cervical, thoracic, and lumbar spine w/wo contrast   Plan is for treatment with the following regimen:   Rituximab 375 mg/m² intravenously yesterday  Initial infusion of high-dose methotrexate 8 g/m² intravenously over 4 hours today  Initiate intrathecal cytarabine with cycle 1 of high-dose methotrexate.  Continue intrathecal Daria-C every 2 weeks x 4  Continue decadron 4mg q 6hrs   Continue keppra 500mg BID x 7 days for seizure ppx   Hold all AC/AP meds   SBP<140  DVT ppx: SCDs, SQH BID   Medical management per primary team   Pain control per primary team   PT/OT   Social work following for assistance with dispo once medically cleared      Neurosurgery will continue to follow, call with any further questions or concerns.  Andrea Cunha      Subjective   Sitting up in chair. ICP's have remained less than 20. Getting imaging today and may require intubation per my discussion with ICU team    Objective :  Temp:  [98 °F (36.7 °C)] 98 °F (36.7 °C)  HR:  [38-60] 60  BP: (107-156)/(49-84) 138/65  Resp:  [16-33] 26  SpO2:  [95 %-99 %] 99 %  O2 Device: None (Room air)    I/O         04/17 0701  04/18 0700 04/18 0701  04/19 0700 04/19 0701  04/20 0700    P.O. 350 100     I.V. (mL/kg) 2024.7 (24) 3600 (50.1)     IV Piggyback 400 1000     Total Intake(mL/kg) 2774.7 (32.9) 4700 (65.4)     Urine (mL/kg/hr) 2475 (1.2) 5100 (3)     Drains 18 0     Total Output 2493 5100     Net +281.7 -400            Unmeasured Urine Occurrence 1 x            Physical Exam Neurological Exam      Lab Results: I have reviewed the following results:  Recent Labs     04/17/25  0621 04/18/25  0512 04/19/25  0570  04/19/25  0719   WBC 6.88   < >  --  10.29*   HGB 13.7   < >  --  13.5   HCT 39.1   < >  --  36.0*      < >  --  87*   SODIUM 136   < > 133*  --    K 4.1   < > 4.9  --       < > 95*  --    CO2 26   < > 30  --    BUN 23   < > 19  --    CREATININE 0.76   < > 0.87  --    GLUC 187*   < > 174*  --    MG 2.1   < > 2.2  --    PHOS 3.3   < > 3.7  --    AST 11*  --   --   --    ALT 33  --   --   --    ALB 3.7  --   --   --    TBILI 0.50  --   --   --    ALKPHOS 44  --   --   --     < > = values in this interval not displayed.     General appearance: alert, appears stated age, cooperative and no distress  Head: Normocephalic, left frontal EVD in place with good waveform.    Eyes: EOMI, PERRL, conjugate gaze  Neck: supple, symmetrical, trachea midline  Lungs: non labored breathing  Heart: Bradycardic  Neurologic:   Mental status: Alert and oriented to person and time.  Able to tell me his last name and birthday.  Able to choose the right year with choices.  Cranial nerves: grossly intact (Cranial nerves II-XII)   Sensory: normal to light touch all extremities x 4  Motor: moving all extremities, able to pick bilateral arms and legs off of bed to command  Reflexes: 2+ and symmetric, no Ilan's or clonus seen          VTE Pharmacologic Prophylaxis: Sequential compression device (Venodyne)  and Heparin

## 2025-04-19 NOTE — PROCEDURES
Universal Protocol:  Consent: The procedure was performed in an emergent situation.  Required items: required blood products, implants, devices, and special equipment available  Patient identity confirmed: arm band, verbally with patient and hospital-assigned identification number  Laceration repair    Date/Time: 4/19/2025 1:38 PM    Performed by: Moncho Thorne DO  Authorized by: Moncho Thorne DO  Body area: head/neck  Location details: scalp  Laceration length: 1.5 cm  Tendon involvement: none  Nerve involvement: none  Vascular damage: no    Sedation:  Patient sedated: no      Wound Dehiscence:  Superficial Wound Dehiscence: simple closure      Procedure Details:  Preparation: Patient was prepped and draped in the usual sterile fashion.  Irrigation solution: saline  Irrigation method: syringe  Amount of cleaning: standard  Debridement: none  Degree of undermining: none  Skin closure: 3-0 Prolene  Number of sutures: 1  Technique: simple  Approximation: close  Approximation difficulty: simple

## 2025-04-19 NOTE — ASSESSMENT & PLAN NOTE
CTA 3/29 with nonspecific 12mm hypodense right hepatic lesion, recommended MRI abdomen  MRI 3/30 with no suspicious hepatic lesions, demonstrated simple right hepatic lobe cyst   Pt taken to surgery at this time.       Ray Pompa RN  10/12/23 [id number]

## 2025-04-19 NOTE — CASE MANAGEMENT
Case Management Discharge Planning Note    Patient name Alexis Dennison  Location ICU 03/ICU 03 MRN [id number]  : 1960 Date 2025       Current Admission Date: 3/29/2025  Current Admission Diagnosis:Brain tumor (HCC)   Patient Active Problem List    Diagnosis Date Noted Date Diagnosed    Goals of care, counseling/discussion 2025     Palliative care encounter 2025     Primary CNS lymphoma 2025     Encephalopathy 2025     Constipation 2025     Ambulatory dysfunction 2025     Thyroid nodule 2025     Pulmonary nodule 2025     Liver lesion 2025     Brain tumor (HCC) 2025     Type 2 diabetes mellitus with hyperglycemia, without long-term current use of insulin (HCC) 2022     HTN, goal below 130/80 2022     Mixed hyperlipidemia 2022     Vitamin D deficiency 2022     NAFLD (nonalcoholic fatty liver disease) 2022       LOS (days): 21  Geometric Mean LOS (GMLOS) (days): 11.1  Days to GMLOS:-9.5     OBJECTIVE:  Risk of Unplanned Readmission Score: 29.02         Current admission status: Inpatient   Preferred Pharmacy:   [facility]/pharmacy #[id number] - [place], [place] - [address] [address]  [address] [address]  Phone: [phone] Fax: [phone]    [email or web address] - [place], [place] - [address]  [address]  [facility] [address]  Phone: [phone] Fax: [phone]    Primary Care Provider: PATI Mckeon    Primary Insurance: BLUE CROSS  Secondary Insurance: CAPITAL    DISCHARGE DETAILS:    CM met with pt and family. Primary team completed, paperwork for pt's FMLA. CM faxed and provided family with fax confirmation    Pt being followed by [facility] and [facility]

## 2025-04-19 NOTE — ASSESSMENT & PLAN NOTE
POD#5 - s/p L frontal endoscopic biopsy of ventricular mass and replacement of EVD  (Causey, 25)  Subependymal lesions with large left basal ganglia suspicious for lymphoma.  P/w confusion, short term memory loss.  Patient had multiple nondiagnostic LPs and hematology/oncology was requesting a biopsy to confirm.   - patient had worsening mental status change and CT head demonstrated progressive ventriculomegaly and likely trapped left lateral ventricle, ultimately a left frontal EVD was placed.   - patient self extubated.  Preliminary pathology, small round blue cell tumor; differential Dx includes lymphoma   EVD replaced during surgery on     Imagin/14 CTH: Left frontal approach ventriculostomy catheter terminating near the foramen of Monro with increased left frontal lobe pneumocephalus and gas within the left frontal horn since prior study. Slightly increased small amount of layering intraventricular hemorrhage within the left occipital horn. Improved hydrocephalus primarily of the left lateral ventricle since prior study.Stable hyperdense mass centered within the left basal ganglia with surrounding vasogenic edema and mild rightward midline shift    Plan:   Continue to closely monitor neuro exam   Frequent neuro checks per primary team   Repeat STAT CTH with any acute decline in GCS > 2pts or more in 1hr   Maintain normotensive BP goals, SBP < 160, MAP > 65   L frontal EVD in place (replaced on  in OR)   Currently open @ 20. Clamped yesterday. ICP's remain less than 20.   Monitor output and ICP closely.  ICP -1-11, ICP 4 while in room  If patient fails over the weekend reopening EVD and drop to 15  Plan for repeat MRI brain w/wo, BT protocol on  to assess response to treatment/if need for VPS   Continue local wound care to incision   Will leave dressing in place given pt will reach up and touch his head   Final pathology: primary CNS non-Hodgkin's lymphoma, diffuse large B-cell  lymphoma   Oncology following closely   Recommend completing staging for his lymphoproliferative disease w/ MRI imaging of the cervical, thoracic, and lumbar spine w/wo contrast   Plan is for treatment with the following regimen:   Rituximab 375 mg/m² intravenously yesterday  Initial infusion of high-dose methotrexate 8 g/m² intravenously over 4 hours today  Initiate intrathecal cytarabine with cycle 1 of high-dose methotrexate.  Continue intrathecal Daria-C every 2 weeks x 4  Continue decadron 4mg q 6hrs   Continue keppra 500mg BID x 7 days for seizure ppx   Hold all AC/AP meds   SBP<140  DVT ppx: SCDs, SQH BID   Medical management per primary team   Pain control per primary team   PT/OT   Social work following for assistance with dispo once medically cleared      Neurosurgery will continue to follow, call with any further questions or concerns.

## 2025-04-19 NOTE — ASSESSMENT & PLAN NOTE
Admitted 3/29 with worsening confusion since prior ED presentation and outpatient imaging  Seen at [facility] ED on 3/24 with 3 week history of altered mental status, memory difficulty  CTH 3/24 demonstrated multiple hyperdense ependyma/subependymal nodules  MRI brain 3/26 performed outpatient demonstrating multiple scattered foci of subependymal nodular enhancement with mild hydrocephalus, scattered nodular areas of enhancement in left anterior inferior basal ganglia dn foci of nodular enhancement along anteromedial aspect of left cerebral peduncle and left quirino  CTH 3/29 (this admission)  Stable subependymal nodules and left formamen of [place] region hyperdense lesion with dilation of left lateral ventrical and minimal left to right midline shift  S/p LP 3/31  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   MRI Brain (4/11) showed progression of disease from 3/30 to 4/11 with interval enlargement of the dominant left anterior basal ganglionic mass with greater surrounding vasogenic edema and mass effect and persistent leptomeningeal and intraventricular seeding with obstructive hydrocephalus--current differential lymphoma versus high-grade glioma less likely metastasis  4/13: continued deterioration in mental status, CT Head 4/13 demonstrated left sided obstructive hydrocephalus from lesion located adjacent to formamen of [place], similar to CT on 4/3; blood products within occiptial horn of left lateral ventricle similar to MRI 4/11; left anterior basal ganglia mass with surrounding edema,  regional mass effect and effacement of left suprasellar cistern, similar to MRI 4/11.  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  Initiation of chemotherapy with high-dose methotrexate. Yesterday 4/17  Methotrexate levels at 1800 today, and daily.   Following with leucovorin rescue Q6h  Daily lab monitoring for TLS - CBC, CMP, Phos, LDH, Uric acid

## 2025-04-19 NOTE — PLAN OF CARE
Problem: PAIN - ADULT  Goal: Verbalizes/displays adequate comfort level or baseline comfort level  Description: Interventions:- Encourage patient to monitor pain and request assistance- Assess pain using appropriate pain scale- Administer analgesics based on type and severity of pain and evaluate response- Implement non-pharmacological measures as appropriate and evaluate response- Notify physician/advanced practitioner if interventions unsuccessful or patient reports new pain  Outcome: Progressing     Problem: INFECTION - ADULT  Goal: Absence or prevention of progression during hospitalization  Description: INTERVENTIONS:- Assess and monitor for signs and symptoms of infection- Monitor lab/diagnostic results- Monitor all insertion sites, i.e. indwelling lines, tubes, and drains- [place] appropriate cooling/warming therapies per order- Administer medications as ordered- Instruct and encourage patient and family to use good hand hygiene technique- Identify and instruct in appropriate isolation precautions for identified infection/condition  Outcome: Progressing     Problem: SAFETY ADULT  Goal: Patient will remain free of falls  Description: INTERVENTIONS:- Educate patient/family on patient safety including physical limitations- Instruct patient to call for assistance with activity - Consult OT/PT to assist with strengthening/mobility - Keep Call bell within reach- Keep bed low and locked with side rails adjusted as appropriate- Keep care items and personal belongings within reach- Initiate and maintain comfort rounds- Make Fall Risk Sign visible to staff- Offer Toileting every 2 Hours, in advance of need- Initiate/Maintain bed/chair alarm- Obtain necessary fall risk management equipment.- Apply yellow socks and bracelet for high fall risk patients- Consider moving patient to room near nurses station  Outcome: Progressing  Goal: Maintain or return to baseline ADL function  Description: INTERVENTIONS:-  Assess  patient's ability to carry out ADLs; assess patient's baseline for ADL function and identify physical deficits which impact ability to perform ADLs (bathing, care of mouth/teeth, toileting, grooming, dressing, etc.)- Assess/evaluate cause of self-care deficits - Assess range of motion- Assess patient's mobility; develop plan if impaired- Assess patient's need for assistive devices and provide as appropriate- Encourage maximum independence but intervene and supervise when necessary- Involve family in performance of ADLs- Assess for home care needs following discharge - Consider OT consult to assist with ADL evaluation and planning for discharge- Provide patient education as appropriate  Outcome: Progressing  Goal: Maintains/Returns to pre admission functional level  Description: INTERVENTIONS:- Perform AM-PAC 6 Click Basic Mobility/ Daily Activity assessment daily.- Set and communicate daily mobility goal to care team and patient/family/caregiver. - Collaborate with rehabilitation services on mobility goals if consulted- Reposition patient every 2 hours.- Dangle patient 3 times a day- Stand patient 3 times a day- Ambulate patient 3 times a day- Out of bed to chair 3 times a day - Out of bed for meals 3 times a day- Out of bed for toileting- Record patient progress and toleration of activity level   Outcome: Progressing     Problem: DISCHARGE PLANNING  Goal: Discharge to home or other facility with appropriate resources  Description: INTERVENTIONS:- Identify barriers to discharge w/patient and caregiver- Arrange for needed discharge resources and transportation as appropriate- Identify discharge learning needs (meds, wound care, etc.)- Refer to Case Management Department for coordinating discharge planning if the patient needs post-hospital services based on physician/advanced practitioner order or complex needs related to functional status, cognitive ability, or social support system  Outcome: Progressing      Problem: Knowledge Deficit  Goal: Patient/family/caregiver demonstrates understanding of disease process, treatment plan, medications, and discharge instructions  Description: Complete learning assessment and assess knowledge base.Interventions:- Provide teaching at level of understanding- Provide teaching via preferred learning methods  Outcome: Progressing     Problem: NEUROSENSORY - ADULT  Goal: Achieves stable or improved neurological status  Description: INTERVENTIONS- Monitor and report changes in neurological status- Monitor vital signs such as temperature, blood pressure, glucose, and any other labs ordered - Initiate measures to prevent increased intracranial pressure- Monitor for seizure activity and implement precautions if appropriate    Outcome: Progressing  Goal: Remains free of injury related to seizures activity  Description: INTERVENTIONS- Maintain airway, patient safety  and administer oxygen as ordered- Monitor patient for seizure activity, document and report duration and description of seizure to physician/advanced practitioner- If seizure occurs,  ensure patient safety during seizure- Reorient patient post seizure- Seizure pads on all 4 side rails- Instruct patient/family to notify RN of any seizure activity including if an aura is experienced- Instruct patient/family to call for assistance with activity based on nursing assessment- Administer anti-seizure medications if ordered  Outcome: Progressing  Goal: Achieves maximal functionality and self care  Description: INTERVENTIONS- Monitor swallowing and airway patency with patient fatigue and changes in neurological status- Encourage and assist patient to increase activity and self care. - Encourage visually impaired, hearing impaired and aphasic patients to use assistive/communication devices  Outcome: Progressing     Problem: METABOLIC, FLUID AND ELECTROLYTES - ADULT  Goal: Glucose maintained within target range  Description: INTERVENTIONS:-  Monitor Blood Glucose as ordered- Assess for signs and symptoms of hyperglycemia and hypoglycemia- Administer ordered medications to maintain glucose within target range- Assess nutritional intake and initiate nutrition service referral as needed  Outcome: Progressing     Problem: Prexisting or High Potential for Compromised Skin Integrity  Goal: Skin integrity is maintained or improved  Description: INTERVENTIONS:- Identify patients at risk for skin breakdown- Assess and monitor skin integrity- Assess and monitor nutrition and hydration status- Monitor labs - Assess for incontinence - Turn and reposition patient- Assist with mobility/ambulation- Relieve pressure over bony prominences- Avoid friction and shearing- Provide appropriate hygiene as needed including keeping skin clean and dry- Evaluate need for skin moisturizer/barrier cream- Collaborate with interdisciplinary team - Patient/family teaching- Consider wound care consult   Outcome: Progressing     Problem: SAFETY,RESTRAINT: NV/NON-SELF DESTRUCTIVE BEHAVIOR  Goal: Remains free of harm/injury (restraint for non violent/non self-detsructive behavior)  Description: INTERVENTIONS:- Instruct patient/family regarding restraint use - Assess and monitor physiologic and psychological status - Provide interventions and comfort measures to meet assessed patient needs - Identify and implement measures to help patient regain control- Assess readiness for release of restraint   Outcome: Progressing  Goal: Returns to optimal restraint-free functioning  Description: INTERVENTIONS:- Assess the patient's behavior and symptoms that indicate continued need for restraint- Identify and implement measures to help patient regain control- Assess readiness for release of restraint   Outcome: Progressing

## 2025-04-19 NOTE — PROCEDURES
Intubation    Date/Time: 4/19/2025 5:10 PM    Performed by: Moncho Thorne DO  Authorized by: Moncho Thorne DO    Patient location:  Bedside  Other Assisting Provider: Yes (comment) (Dr. Smith)    Consent:     Consent obtained:  Verbal    Consent given by: Daughter.    Risks discussed:  Aspiration, brain injury, death, bleeding, dental trauma, hypoxia, laryngeal injury and pneumothorax    Alternatives discussed:  No treatment, delayed treatment and alternative treatment  Universal protocol:     Procedure explained and questions answered to patient or proxy's satisfaction: yes      Relevant documents present and verified: yes      Test results available and properly labeled: yes      Radiology Images displayed and confirmed.  If images not available, report reviewed: yes      Required blood products, implants, devices, and special equipment available: yes      Site/side marked: yes      Immediately prior to procedure, a time out was called: yes      Patient identity confirmed:  Arm band and hospital-assigned identification number  Pre-procedure details:     Patient status:  Awake    Mallampati score:  3    Pretreatment medications:  Lidocaine and propofol    Paralytics:  Succinylcholine  Indications:     Indications for intubation: airway protection    Procedure details:     Preoxygenation:  Bag valve mask    CPR in progress: no      Intubation method:  Oral    Oral intubation technique:  Glidescope    Laryngoscope blade:  Mac 3    Tube size (mm):  7.0    Tube type:  Cuffed    Number of attempts:  1    Ventilation between attempts: no      Cricoid pressure: yes      Tube visualized through cords: yes    Placement assessment:     ETT to lip:  24    ETT to teeth:  22    Tube secured with:  ETT che    Breath sounds:  Equal    Placement verification: chest rise, condensation, colorimetric ETCO2 device, CXR verification, direct visualization and equal breath sounds      CXR findings:  ETT in proper place    Ventilator  settings:  AC//14/6/40%  Post-procedure details:     Patient tolerance of procedure:  Tolerated well, no immediate complications

## 2025-04-19 NOTE — PROGRESS NOTES
Progress Note - Critical Care/ICU   Name: Alexis Dennison 64 y.o. male I MRN: [id number]  Unit/Bed#: ICU 03 I Date of Admission: 3/29/2025   Date of Service: 2025 I Hospital Day:        Assessment & Plan   Active Hospital Problems    Diagnosis Date Noted POA    Brain tumor (HCC) 2025 Yes    Goals of care, counseling/discussion 2025 Not Applicable    Palliative care encounter 2025 Not Applicable    Primary CNS lymphoma 2025 Unknown    Encephalopathy 2025 Yes    Constipation 2025 No    Ambulatory dysfunction 2025 Yes    Thyroid nodule 2025 Yes    Pulmonary nodule 2025 Yes    Liver lesion 2025 Yes    Type 2 diabetes mellitus with hyperglycemia, without long-term current use of insulin (HCC) 2022 Yes     Chronic    Mixed hyperlipidemia 2022 Yes     Chronic    HTN, goal below 130/80 2022 Yes     Chronic      Resolved Hospital Problems    Diagnosis Date Noted Date Resolved POA    Foot erythema 2025 Unknown    Abnormal CT scan 2025 Yes     Neuro/Psych:  Diagnoses: Left basal ganglia mass w/ surrounding edema S/P biopsy w/ concern for CNS Large B Cell Lymphoma; Obstructive hydrocephalus; Small IVH; Encephalopathy   Imagin/11 MRI: Interval enlargement of the dominant left anterior basal ganglionic mass lesion with greater surrounding vasogenic edema and mass effect. Redemonstrated findings of leptomeningeal and intraventricular seeding with obstructive hydrocephalus and transependymal flow of CSF    CT head: largely unchanged left sided obstructive hydrocephalus, blood products within the occipital horn of the left lateral ventricle is similar from the MRI brain . Left anterior basal ganglia mass with surrounding edema, regional mass effect, and effacement of the left suprasellar cistern, appears similar to MRI of the brain 2025 S/P Bedside EVD placement for obstructive hydrocephalus  on 4/13 4/14 S/P Biopsy of brain mass via Neurosurgery and replaced EVD  Plan:  Neurosurgery following, appreciate recommendations   EVD clamped with no change in Neuroexam, will defer to NS regarding removal  Decadron 4 mg q6 hours  Keppra for seizure ppx   Repeat CTH for any change in GCS > 2 points in 1 hour   Neuro checks every 2 hours  Seroquel to help sleep-wake cycle  Analgesia: Multimodal.  Tylenol 650 mg as needed for mild pain; oxycodone 2.5/5 for moderate to severe pain; Dilaudid 0.2 mg for breakthrough pain    CV:  Diagnoses: Hypertension, hyperlipidemia; sinus bradycardia  Plan:  Continue atorvastatin 20 daily  Continuous cardiopulmonary monitoring. Maintain MAP >65.    Pulm:  Diagnoses: No active issues   Plan:  Continuous pulse oximetry. Maintain O2 sat >92%.     GI:  Diagnoses: GERD  Last BM: 4/15  Plan:  Continue PPI  Bowel regimen: Senna, MiraLax, Dulcolax     :  Diagnoses: No active issues  Baseline creatinine: 0.8-0.9  Plan:  Trend renal indices   Monitor I/Os    F/E/N:  Plan:   F: Bicarb in dextrose @ 150 mL/h. Fluid resuscitation prn.  E: Monitor and replete electrolytes for Mg >2, Phos >3, K >4.  N: Dysphagia diet 3    Heme/Onc:  Diagnoses: Concern for CNS lymphoma  Plan:  Oncology following, appreciate recommendations  Rituximab, high-dose methotrexate & intrathecal methotrexate  Leucovorin rescue q6 hours  Continue allopurinol daily  Maintain bicarb drip  Continue to trend Urine pH  Monitor for TLS  VTE prophylaxis: Subcu heparin    Endo:  Diagnoses: Type 2 diabetes mellitus  Home regimen: Metformin, Janumet  Plan:   Insulin: Continue sliding scale insulin. Monitor blood glucose.    ID:  Diagnoses: No active issues  Plan:  Monitor fever curve and WBC.    MSK/Skin:  Diagnoses: Surgical incisions  Plan:  PT/OT when appropriate. Encourage OOB and ambulation when appropriate. Local wound care prn.    LDAs:  Lines - right PICC line, PIV (1)   Drains - EVD  Airways -  n/a    Disposition:  Critical care    ICU Core Measures     A: Assess, Prevent, and Manage Pain Has pain been assessed? Yes  Need for changes to pain regimen? No   B: Both SAT/SAT  N/A   C: Choice of Sedation RASS Goal: 0 Alert and Calm or N/A patient not on sedation  Need for changes to sedation or analgesia regimen? NA   D: Delirium CAM-ICU: Unable to perform secondary to Acute cognitive dysfunction   E: Early Mobility  Plan for early mobility? Yes   F: Family Engagement Plan for family engagement today? Yes       Review of Invasive Devices:      Central access plan: Medications requiring central line      Prophylaxis:  VTE VTE covered by:  [Held by provider] heparin (porcine), Subcutaneous, 5,000 Units at 04/17/25 2114       Stress Ulcer  covered byomeprazole (PriLOSEC) 20 mg delayed release capsule [[id number]] (Long-Term Med), pantoprazole (PROTONIX) EC tablet 40 mg [[id number]]         24 Hour Events : Patient has disturbance in his sleep-wake cycle.  Patient received about 1 hour of sleep overnight.  Plan to initiate Seroquel treatment to help with sleep-wake cycle.  Patient also had episodes of hiccups.    Subjective       Objective :                   Vitals I/O      Most Recent Min/Max in 24hrs   Temp 98 °F (36.7 °C) Temp  Min: 98 °F (36.7 °C)  Max: 98 °F (36.7 °C)   Pulse 60 Pulse  Min: 38  Max: 60   Resp 21 Resp  Min: 16  Max: 33   /78 BP  Min: 107/49  Max: 156/78   O2 Sat 98 % SpO2  Min: 95 %  Max: 99 %      Intake/Output Summary (Last 24 hours) at 4/19/2025 0735  Last data filed at 4/19/2025 0600  Gross per 24 hour   Intake 4700 ml   Output 6100 ml   Net -1400 ml       Diet Dysphagia/Modified Consistency; Dysphagia 3-Dental Soft; Thin Liquid; Consistent Carbohydrate Diet Level 2 (5 carb servings/75 grams CHO/meal), Sodium 4 GM (RYAN)    Invasive Monitoring           Physical Exam   Physical Exam  Vitals and nursing note reviewed.   Eyes:      Extraocular Movements: Extraocular movements intact.      Conjunctiva/sclera:  Conjunctivae normal.      Pupils: Pupils are equal, round, and reactive to light.   Skin:     General: Skin is warm and dry.   HENT:      Head:      Comments: EVD in place; Surgical sutures in place, C/D/I     Nose: No congestion.      Mouth/Throat:      Mouth: Mucous membranes are moist.   Cardiovascular:      Rate and Rhythm: Regular rhythm. Bradycardia present.      Pulses: Normal pulses.      Heart sounds: Normal heart sounds.   Abdominal:      Palpations: Abdomen is soft.   Constitutional:       General: He is not in acute distress.     Appearance: He is well-developed.   Pulmonary:      Effort: Pulmonary effort is normal.   Neurological:      Comments: Alert and oriented to person and place this morning. Follows commands in all extremities. Exam overall waxing and waning.    Genitourinary/Anorectal:  external catheter present.        Diagnostic Studies        Lab Results: I have reviewed the following results:     Medications:  Scheduled PRN   allopurinol, 300 mg, Daily  atorvastatin, 20 mg, Daily  bisacodyl, 10 mg, Daily  chlorhexidine, 15 mL, Q12H CAIT  [Held by provider] Cholecalciferol, 2,000 Units, Daily  [Held by provider] vitamin B-12, 1,000 mcg, Daily  dexamethasone, 4 mg, Q6H CAIT  [Held by provider] heparin (porcine), 5,000 Units, Q12H CAIT  insulin lispro, 5-25 Units, TID AC  insulin lispro, 5-25 Units, HS  levETIRAcetam, 500 mg, Q12H CAIT  pantoprazole, 40 mg, Early Morning  polyethylene glycol, 17 g, Daily  senna, 2 tablet, HS      acetaminophen, 650 mg, Q6H PRN  alteplase, 2 mg, Q1MIN PRN  aluminum-magnesium hydroxide-simethicone, 30 mL, Q4H PRN  calcium carbonate, 1,000 mg, Daily PRN  HYDROmorphone, 0.2 mg, Q2H PRN  LORazepam, 0.5 mg, Daily PRN  ondansetron, 4 mg, Q6H PRN  oxyCODONE, 2.5 mg, Q4H PRN   Or  oxyCODONE, 5 mg, Q4H PRN  sodium chloride, 20 mL/hr, Once PRN  sodium chloride, 20 mL/hr, Once PRN       Continuous    sodium bicarbonate 100 mEq in dextrose 5 % 1,000 mL infusion, 150 mL/hr, Last  Rate: 150 mL/hr (04/19/25 0526)         Labs:   CBC    Recent Labs     04/18/25 0512   WBC 10.59*   HGB 13.9   HCT 38.8   *     BMP    Recent Labs     04/18/25 0512 04/19/25  0528   SODIUM 134* 133*   K 3.7 4.9   CL 97 95*   CO2 29 30   AGAP 8 8   BUN 18 19   CREATININE 0.74 0.87   CALCIUM 9.1 8.8       Coags    No recent results     Additional Electrolytes  Recent Labs     04/18/25 0512 04/19/25  0528   MG 1.9 2.2   PHOS 3.1 3.7          Blood Gas    No recent results  No recent results LFTs  No recent results      Infectious  No recent results  Glucose  Recent Labs     04/18/25 0512 04/19/25  0528   GLUC 240* 174*

## 2025-04-20 ENCOUNTER — APPOINTMENT (INPATIENT)
Dept: RADIOLOGY | Facility: HOSPITAL | Age: 65
DRG: 820 | End: 2025-04-20
Payer: COMMERCIAL

## 2025-04-20 LAB
ANION GAP SERPL CALCULATED.3IONS-SCNC: 6 MMOL/L (ref 4–13)
B2 MICROGLOB SERPL-MCNC: 1.2 MG/L (ref 0.6–2.4)
BACTERIA UR QL AUTO: ABNORMAL /HPF
BILIRUB UR QL STRIP: NEGATIVE
BILIRUB UR QL STRIP: NEGATIVE
BUN SERPL-MCNC: 20 MG/DL (ref 5–25)
CALCIUM SERPL-MCNC: 8.8 MG/DL (ref 8.4–10.2)
CHLORIDE SERPL-SCNC: 95 MMOL/L (ref 96–108)
CLARITY UR: ABNORMAL
CLARITY UR: NORMAL
CO2 SERPL-SCNC: 34 MMOL/L (ref 21–32)
COLOR UR: COLORLESS
COLOR UR: NORMAL
CREAT SERPL-MCNC: 0.93 MG/DL (ref 0.6–1.3)
ERYTHROCYTE [DISTWIDTH] IN BLOOD BY AUTOMATED COUNT: 11.9 % (ref 11.6–15.1)
GFR SERPL CREATININE-BSD FRML MDRD: 85 ML/MIN/1.73SQ M
GLUCOSE SERPL-MCNC: 169 MG/DL (ref 65–140)
GLUCOSE SERPL-MCNC: 186 MG/DL (ref 65–140)
GLUCOSE SERPL-MCNC: 216 MG/DL (ref 65–140)
GLUCOSE SERPL-MCNC: 222 MG/DL (ref 65–140)
GLUCOSE SERPL-MCNC: 225 MG/DL (ref 65–140)
GLUCOSE UR STRIP-MCNC: ABNORMAL MG/DL
GLUCOSE UR STRIP-MCNC: NEGATIVE MG/DL
HCT VFR BLD AUTO: 37.3 % (ref 36.5–49.3)
HGB BLD-MCNC: 13.6 G/DL (ref 12–17)
HGB UR QL STRIP.AUTO: ABNORMAL
HGB UR QL STRIP.AUTO: NEGATIVE
KETONES UR STRIP-MCNC: NEGATIVE MG/DL
KETONES UR STRIP-MCNC: NEGATIVE MG/DL
LDH SERPL-CCNC: 164 U/L (ref 140–271)
LEUKOCYTE ESTERASE UR QL STRIP: NEGATIVE
LEUKOCYTE ESTERASE UR QL STRIP: NEGATIVE
MAGNESIUM SERPL-MCNC: 2.2 MG/DL (ref 1.9–2.7)
MCH RBC QN AUTO: 31.3 PG (ref 26.8–34.3)
MCHC RBC AUTO-ENTMCNC: 36.5 G/DL (ref 31.4–37.4)
MCV RBC AUTO: 86 FL (ref 82–98)
NITRITE UR QL STRIP: NEGATIVE
NITRITE UR QL STRIP: NEGATIVE
NON-SQ EPI CELLS URNS QL MICRO: ABNORMAL /HPF
PH UR STRIP.AUTO: 7.5 [PH]
PH UR STRIP.AUTO: 8 [PH]
PHOSPHATE SERPL-MCNC: 3.9 MG/DL (ref 2.3–4.1)
PLATELET # BLD AUTO: 106 THOUSANDS/UL (ref 149–390)
PMV BLD AUTO: 11 FL (ref 8.9–12.7)
POTASSIUM SERPL-SCNC: 3.5 MMOL/L (ref 3.5–5.3)
PROT UR STRIP-MCNC: ABNORMAL MG/DL
PROT UR STRIP-MCNC: NEGATIVE MG/DL
RBC # BLD AUTO: 4.34 MILLION/UL (ref 3.88–5.62)
RBC #/AREA URNS AUTO: ABNORMAL /HPF
SODIUM SERPL-SCNC: 135 MMOL/L (ref 135–147)
SP GR UR STRIP.AUTO: 1.01 (ref 1–1.03)
SP GR UR STRIP.AUTO: 1.01 (ref 1–1.03)
URATE SERPL-MCNC: 2.4 MG/DL (ref 3.5–8.5)
UROBILINOGEN UR STRIP-ACNC: <2 MG/DL
UROBILINOGEN UR STRIP-ACNC: <2 MG/DL
WBC # BLD AUTO: 7.23 THOUSAND/UL (ref 4.31–10.16)
WBC #/AREA URNS AUTO: ABNORMAL /HPF

## 2025-04-20 PROCEDURE — 82948 REAGENT STRIP/BLOOD GLUCOSE: CPT

## 2025-04-20 PROCEDURE — 70450 CT HEAD/BRAIN W/O DYE: CPT

## 2025-04-20 PROCEDURE — 81001 URINALYSIS AUTO W/SCOPE: CPT | Performed by: INTERNAL MEDICINE

## 2025-04-20 PROCEDURE — A9585 GADOBUTROL INJECTION: HCPCS | Performed by: ANESTHESIOLOGY

## 2025-04-20 PROCEDURE — 94760 N-INVAS EAR/PLS OXIMETRY 1: CPT

## 2025-04-20 PROCEDURE — 70553 MRI BRAIN STEM W/O & W/DYE: CPT

## 2025-04-20 PROCEDURE — 80204 DRUG ASSAY METHOTREXATE: CPT

## 2025-04-20 PROCEDURE — 99024 POSTOP FOLLOW-UP VISIT: CPT | Performed by: SPECIALIST

## 2025-04-20 PROCEDURE — 85027 COMPLETE CBC AUTOMATED: CPT

## 2025-04-20 PROCEDURE — 84550 ASSAY OF BLOOD/URIC ACID: CPT

## 2025-04-20 PROCEDURE — 84100 ASSAY OF PHOSPHORUS: CPT

## 2025-04-20 PROCEDURE — 99233 SBSQ HOSP IP/OBS HIGH 50: CPT | Performed by: ANESTHESIOLOGY

## 2025-04-20 PROCEDURE — 94003 VENT MGMT INPAT SUBQ DAY: CPT

## 2025-04-20 PROCEDURE — 80048 BASIC METABOLIC PNL TOTAL CA: CPT

## 2025-04-20 PROCEDURE — 83615 LACTATE (LD) (LDH) ENZYME: CPT

## 2025-04-20 PROCEDURE — 83735 ASSAY OF MAGNESIUM: CPT

## 2025-04-20 PROCEDURE — 81003 URINALYSIS AUTO W/O SCOPE: CPT | Performed by: INTERNAL MEDICINE

## 2025-04-20 RX ORDER — GADOBUTROL 604.72 MG/ML
7 INJECTION INTRAVENOUS
Status: COMPLETED | OUTPATIENT
Start: 2025-04-20 | End: 2025-04-20

## 2025-04-20 RX ORDER — METOCLOPRAMIDE HYDROCHLORIDE 5 MG/ML
10 INJECTION INTRAMUSCULAR; INTRAVENOUS ONCE
Status: COMPLETED | OUTPATIENT
Start: 2025-04-20 | End: 2025-04-20

## 2025-04-20 RX ORDER — HEPARIN SODIUM 5000 [USP'U]/ML
5000 INJECTION, SOLUTION INTRAVENOUS; SUBCUTANEOUS EVERY 12 HOURS SCHEDULED
Status: DISCONTINUED | OUTPATIENT
Start: 2025-04-20 | End: 2025-04-20

## 2025-04-20 RX ORDER — OLANZAPINE 10 MG/1
10 TABLET, ORALLY DISINTEGRATING ORAL
Status: DISCONTINUED | OUTPATIENT
Start: 2025-04-20 | End: 2025-04-21

## 2025-04-20 RX ADMIN — LEUCOVORIN CALCIUM 25 MG: 25 TABLET ORAL at 17:37

## 2025-04-20 RX ADMIN — METOCLOPRAMIDE HYDROCHLORIDE 10 MG: 5 INJECTION INTRAMUSCULAR; INTRAVENOUS at 17:41

## 2025-04-20 RX ADMIN — LEUCOVORIN CALCIUM 25 MG: 25 TABLET ORAL at 06:23

## 2025-04-20 RX ADMIN — LEUCOVORIN CALCIUM 25 MG: 25 TABLET ORAL at 12:12

## 2025-04-20 RX ADMIN — CHLORHEXIDINE GLUCONATE 0.12% ORAL RINSE 15 ML: 1.2 LIQUID ORAL at 20:49

## 2025-04-20 RX ADMIN — OLANZAPINE 10 MG: 10 TABLET, ORALLY DISINTEGRATING ORAL at 22:52

## 2025-04-20 RX ADMIN — DEXAMETHASONE SODIUM PHOSPHATE 4 MG: 4 INJECTION INTRA-ARTICULAR; INTRALESIONAL; INTRAMUSCULAR; INTRAVENOUS; SOFT TISSUE at 17:16

## 2025-04-20 RX ADMIN — DEXAMETHASONE SODIUM PHOSPHATE 4 MG: 4 INJECTION INTRA-ARTICULAR; INTRALESIONAL; INTRAMUSCULAR; INTRAVENOUS; SOFT TISSUE at 06:21

## 2025-04-20 RX ADMIN — CHLORHEXIDINE GLUCONATE 0.12% ORAL RINSE 15 ML: 1.2 LIQUID ORAL at 09:08

## 2025-04-20 RX ADMIN — SODIUM BICARBONATE 150 ML/HR: 84 INJECTION, SOLUTION INTRAVENOUS at 07:45

## 2025-04-20 RX ADMIN — LEVETIRACETAM 500 MG: 500 TABLET, FILM COATED ORAL at 08:42

## 2025-04-20 RX ADMIN — PANTOPRAZOLE SODIUM 40 MG: 40 TABLET, DELAYED RELEASE ORAL at 06:21

## 2025-04-20 RX ADMIN — INSULIN LISPRO 5 UNITS: 100 INJECTION, SOLUTION INTRAVENOUS; SUBCUTANEOUS at 22:53

## 2025-04-20 RX ADMIN — GADOBUTROL 7 ML: 604.72 INJECTION INTRAVENOUS at 01:20

## 2025-04-20 RX ADMIN — ATORVASTATIN CALCIUM 20 MG: 20 TABLET, FILM COATED ORAL at 08:42

## 2025-04-20 RX ADMIN — INSULIN LISPRO 5 UNITS: 100 INJECTION, SOLUTION INTRAVENOUS; SUBCUTANEOUS at 12:10

## 2025-04-20 RX ADMIN — ALLOPURINOL 300 MG: 300 TABLET ORAL at 09:59

## 2025-04-20 RX ADMIN — INSULIN LISPRO 10 UNITS: 100 INJECTION, SOLUTION INTRAVENOUS; SUBCUTANEOUS at 09:15

## 2025-04-20 RX ADMIN — INSULIN LISPRO 10 UNITS: 100 INJECTION, SOLUTION INTRAVENOUS; SUBCUTANEOUS at 16:02

## 2025-04-20 RX ADMIN — DEXAMETHASONE SODIUM PHOSPHATE 4 MG: 4 INJECTION INTRA-ARTICULAR; INTRALESIONAL; INTRAMUSCULAR; INTRAVENOUS; SOFT TISSUE at 12:23

## 2025-04-20 RX ADMIN — LEVETIRACETAM 500 MG: 500 TABLET, FILM COATED ORAL at 20:49

## 2025-04-20 RX ADMIN — BISACODYL 10 MG: 10 SUPPOSITORY RECTAL at 09:09

## 2025-04-20 RX ADMIN — HEPARIN SODIUM 5000 UNITS: 5000 INJECTION, SOLUTION INTRAVENOUS; SUBCUTANEOUS at 10:09

## 2025-04-20 RX ADMIN — SENNOSIDES 17.2 MG: 8.6 TABLET, FILM COATED ORAL at 22:51

## 2025-04-20 RX ADMIN — POLYETHYLENE GLYCOL 3350 17 G: 17 POWDER, FOR SOLUTION ORAL at 08:42

## 2025-04-20 RX ADMIN — PROPOFOL 30 MCG/KG/MIN: 10 INJECTION, EMULSION INTRAVENOUS at 02:40

## 2025-04-20 NOTE — PROGRESS NOTES
Progress Note - Neurosurgery   Name: Alexis Dennison 64 y.o. male I MRN: [id number]  Unit/Bed#: ICU 03 I Date of Admission: 3/29/2025   Date of Service: 2025 I Hospital Day: 22     Assessment & Plan  Brain tumor (HCC)  POD#6 - s/p L frontal endoscopic biopsy of ventricular mass and replacement of EVD  (Causey, 25)  Subependymal lesions with large left basal ganglia suspicious for lymphoma.  P/w confusion, short term memory loss.  Patient had multiple nondiagnostic LPs and hematology/oncology was requesting a biopsy to confirm.   - patient had worsening mental status change and CT head demonstrated progressive ventriculomegaly and likely trapped left lateral ventricle, ultimately a left frontal EVD was placed.   - patient self extubated.  Preliminary pathology, small round blue cell tumor; differential Dx includes lymphoma   EVD replaced during surgery on   Patient pulled out EVD yesterday but ICP's were low for > 24 hours despite clamping. We did not need to replace because of this.     Imagin/14 CTH: Left frontal approach ventriculostomy catheter terminating near the foramen of Monro with increased left frontal lobe pneumocephalus and gas within the left frontal horn since prior study. Slightly increased small amount of layering intraventricular hemorrhage within the left occipital horn. Improved hydrocephalus primarily of the left lateral ventricle since prior study.Stable hyperdense mass centered within the left basal ganglia with surrounding vasogenic edema and mild rightward midline shift    Plan:   Continue to closely monitor neuro exam   Frequent neuro checks per primary team   Repeat STAT CTH with any acute decline in GCS > 2pts or more in 1hr   Maintain normotensive BP goals, SBP < 160, MAP > 65   L frontal EVD pulled out yesterday.  Plan for repeat MRI brain w/wo, BT protocol on  today response to treatment/if need for VPS   Continue local wound care to incision  Final  pathology: primary CNS non-Hodgkin's lymphoma, diffuse large B-cell lymphoma   Oncology following closely   Recommend completing staging for his lymphoproliferative disease w/ MRI imaging of the cervical, thoracic, and lumbar spine w/wo contrast   Plan is for treatment with the following regimen:   Rituximab 375 mg/m² intravenously yesterday  Initial infusion of high-dose methotrexate 8 g/m² intravenously over 4 hours today  Initiate intrathecal cytarabine with cycle 1 of high-dose methotrexate.  Continue intrathecal Daria-C every 2 weeks x 4  On decadron  Continue keppra 500mg BID x 7 days for seizure ppx   Hold all AC/AP meds   SBP<140  DVT ppx: SCDs, SQH BID   Medical management per primary team   Pain control per primary team   PT/OT   Social work following for assistance with dispo once medically cleared      Neurosurgery will continue to follow. Case will be discussed with Dr. Causey tomorrow.  Call with any further questions or concerns.  Andrea Cunha MD      Subjective   Patient pulled out EVD yestrday but ICP's were less than 20 before this occurred for > 24 hours.     Objective :  Temp:  [97 °F (36.1 °C)-98.6 °F (37 °C)] 97 °F (36.1 °C)  HR:  [40-60] 44  BP: ()/(49-88) 104/62  Resp:  [13-33] 13  SpO2:  [97 %-100 %] 100 %  O2 Device: Ventilator  FiO2 (%):  [40] 40    I/O         04/18 0701  04/19 0700 04/19 0701  04/20 0700 04/20 0701  04/21 0700    P.O. 100 360     I.V. (mL/kg) 3600 (50.1) 3092.8 (43)     IV Piggyback 1000 50     Total Intake(mL/kg) 4700 (65.4) 3502.8 (48.7)     Urine (mL/kg/hr) 5100 (3) 3600 (2.1)     Drains 0      Total Output 5100 3600     Net -400 -97.2                  Physical Exam Neurological Exam    Patient intubated and sedated for imaging studies so exam not obtained       Lab Results: I have reviewed the following results:  Recent Labs     04/20/25  0534   WBC 7.23   HGB 13.6   HCT 37.3   *   SODIUM 135   K 3.5   CL 95*   CO2 34*   BUN 20   CREATININE 0.93   GLUC 222*    MG 2.2   PHOS 3.9

## 2025-04-20 NOTE — ASSESSMENT & PLAN NOTE
POD#6 - s/p L frontal endoscopic biopsy of ventricular mass and replacement of EVD  (Causey, 25)  Subependymal lesions with large left basal ganglia suspicious for lymphoma.  P/w confusion, short term memory loss.  Patient had multiple nondiagnostic LPs and hematology/oncology was requesting a biopsy to confirm.   - patient had worsening mental status change and CT head demonstrated progressive ventriculomegaly and likely trapped left lateral ventricle, ultimately a left frontal EVD was placed.   - patient self extubated.  Preliminary pathology, small round blue cell tumor; differential Dx includes lymphoma   EVD replaced during surgery on   Patient pulled out EVD yesterday but ICP's were low for > 24 hours despite clamping. We did not need to replace because of this.     Imagin/14 CTH: Left frontal approach ventriculostomy catheter terminating near the foramen of Monro with increased left frontal lobe pneumocephalus and gas within the left frontal horn since prior study. Slightly increased small amount of layering intraventricular hemorrhage within the left occipital horn. Improved hydrocephalus primarily of the left lateral ventricle since prior study.Stable hyperdense mass centered within the left basal ganglia with surrounding vasogenic edema and mild rightward midline shift    Plan:   Continue to closely monitor neuro exam   Frequent neuro checks per primary team   Repeat STAT CTH with any acute decline in GCS > 2pts or more in 1hr   Maintain normotensive BP goals, SBP < 160, MAP > 65   L frontal EVD pulled out yesterday.  Plan for repeat MRI brain w/wo, BT protocol on  today response to treatment/if need for VPS   Continue local wound care to incision  Final pathology: primary CNS non-Hodgkin's lymphoma, diffuse large B-cell lymphoma   Oncology following closely   Recommend completing staging for his lymphoproliferative disease w/ MRI imaging of the cervical, thoracic, and lumbar  spine w/wo contrast   Plan is for treatment with the following regimen:   Rituximab 375 mg/m² intravenously yesterday  Initial infusion of high-dose methotrexate 8 g/m² intravenously over 4 hours today  Initiate intrathecal cytarabine with cycle 1 of high-dose methotrexate.  Continue intrathecal Daria-C every 2 weeks x 4  On decadron  Continue keppra 500mg BID x 7 days for seizure ppx   Hold all AC/AP meds   SBP<140  DVT ppx: SCDs, SQH BID   Medical management per primary team   Pain control per primary team   PT/OT   Social work following for assistance with dispo once medically cleared      Neurosurgery will continue to follow. Case will be discussed with Dr. Causey tomorrow.  Call with any further questions or concerns.

## 2025-04-20 NOTE — RESPIRATORY THERAPY NOTE
04/20/25 0904   Respiratory Assessment   Resp Comments Extubated pt to RA as ordered by MD. Pt tolerated well.

## 2025-04-20 NOTE — PROGRESS NOTES
Progress Note - Critical Care/ICU   Name: Alexis Dennison 64 y.o. male I MRN: [id number]  Unit/Bed#: ICU 03 I Date of Admission: 3/29/2025   Date of Service: 4/20/2025 I Hospital Day: 22      Assessment & Plan   Active Hospital Problems    Diagnosis Date Noted POA    Brain tumor (HCC) 03/29/2025 Yes    Goals of care, counseling/discussion 04/16/2025 Not Applicable    Palliative care encounter 04/16/2025 Not Applicable    Primary CNS lymphoma 04/16/2025 Unknown    Encephalopathy 04/02/2025 Yes    Constipation 04/01/2025 No    Ambulatory dysfunction 04/01/2025 Yes    Thyroid nodule 03/30/2025 Yes    Pulmonary nodule 03/30/2025 Yes    Liver lesion 03/30/2025 Yes    Type 2 diabetes mellitus with hyperglycemia, without long-term current use of insulin (HCC) 06/14/2022 Yes     Chronic    Mixed hyperlipidemia 06/14/2022 Yes     Chronic    HTN, goal below 130/80 06/14/2022 Yes     Chronic      Resolved Hospital Problems    Diagnosis Date Noted Date Resolved POA    Foot erythema 04/12/2025 04/14/2025 Unknown    Abnormal CT scan 03/29/2025 03/30/2025 Yes     Neuro/Psych:  Diagnoses: Left basal ganglia mass w/ surrounding edema S/P biopsy w/ concern for CNS Large B Cell Lymphoma; Obstructive hydrocephalus; Small IVH; Encephalopathy   Imaging  4/11 MRI: Interval enlargement of the dominant left anterior basal ganglionic mass lesion with greater surrounding vasogenic edema and mass effect. Redemonstrated findings of leptomeningeal and intraventricular seeding with obstructive hydrocephalus and transependymal flow of CSF   4/13 CT head: largely unchanged left sided obstructive hydrocephalus, blood products within the occipital horn of the left lateral ventricle is similar from the MRI brain 4/11. Left anterior basal ganglia mass with surrounding edema, regional mass effect, and effacement of the left suprasellar cistern, appears similar to MRI of the brain 4/11/2025 4/20 CT Head, MRI Brain, C/T/L spine pending.  4/13 S/P Bedside  EVD placement for obstructive hydrocephalus on 4/13 4/14 S/P Biopsy of brain mass via Neurosurgery and replaced EVD  4/19 EVD pulled by patient  Plan:  Neurosurgery following, appreciate recommendations   Decadron 4 mg q6 hours  Keppra 500mg BID for seizure ppx   Repeat CTH for any change in GCS > 2 points in 1 hour   Neuro checks every 2 hours  Analgesia: Multimodal.  Tylenol 650 mg as needed for mild pain; oxycodone 2.5/5 for moderate to severe pain; Dilaudid 0.2 mg for breakthrough pain    CV:  Diagnoses: HTN, HLD, sinus bradycardia  Plan:  Continue lipitor 20 daily  Continuous cardiopulmonary monitoring. Maintain MAP >65.    Pulm:  Diagnoses: Elective intubation  Vent: SCMV Vt 450/FiO2 40/PEEP 6. Daily SAT/SBT. Wean FiO2 and PEEP as tolerated.   Plan:  Likely extubation today.  Continuous pulse oximetry. Maintain O2 sat >92%.     GI:  Diagnoses: GERD  Plan:  Bowel regimen: Senna, Miralax, dulcolax suppository  GI prophylaxis: Continue PPI protonix 40 daily  Reinitiate dysphagia diet after extubation    :  Diagnoses: None  Baseline creatinine: .08-.09  Plan:  Monitor I/Os, renal function.    F/E/N:  Diagnoses:  Plan:   F: Bicarb gtt ending today. Fluid resuscitation prn.  E: Monitor and replete electrolytes for Mg >2, Phos >3, K >4.  N: Plan for dysphagia diet 3    Heme/Onc:  Diagnoses: CNS Large B Cell Lymphoma  Plan:  Oncology following, appreciate recommendations  Rituximab, high-dose methotrexate & intrathecal methotrexate  Leucovorin rescue q6 hours  Continue allopurinol daily  Maintain bicarb drip  Continue to trend Urine pH  Monitor for TLS  VTE prophylaxis: SCDs    Endo:  Diagnoses: DM2  Plan:   Insulin: SSI. Monitor blood glucose.    ID:  Diagnoses: None  Plan:  Monitor fever curve and WBC.    MSK/Skin:  Diagnoses:   Plan:  PT/OT when appropriate. Encourage OOB and ambulation when appropriate. Local wound care prn.    LDAs:  Lines - R PICC line  Drains - External urinary catheter  Airways -   ETT    Disposition: Critical care    ICU Core Measures     Vented Patient  VAP Bundle  VAP bundle ordered     A: Assess, Prevent, and Manage Pain Has pain been assessed? Yes  Need for changes to pain regimen? No   B: Both Spontaneous Awakening Trials (SATs) and Spontaneous Breathing Trials (SBTs) Plan to perform spontaneous awakening trial today? Yes   Plan to perform spontaneous breathing trial today? Yes   Obvious barriers to extubation? No   C: Choice of Sedation RASS Goal: -1 Drowsy or 0 Alert and Calm  Need for changes to sedation or analgesia regimen? No   D: Delirium CAM-ICU: Unable to perform secondary to Dementia or other chronic cognitive dysfunction   E: Early Mobility  Plan for early mobility? Yes   F: Family Engagement Plan for family engagement today? Yes       Review of Invasive Devices:      Central access plan: Medications requiring central line      Prophylaxis:  VTE VTE covered by:  [Held by provider] heparin (porcine), Subcutaneous, 5,000 Units at 04/17/25 2114       Stress Ulcer  covered byomeprazole (PriLOSEC) 20 mg delayed release capsule [[id number]] (Long-Term Med), pantoprazole (PROTONIX) EC tablet 40 mg [[id number]]         24 Hour Events : EVD pulled by patient. Received MRI Brain C/T/L spin, CT head.  Subjective  Intubated, unable to obtain.  Review of Systems: Review of Systems not obtainable due to Clinical Condition    Objective :                   Vitals I/O      Most Recent Min/Max in 24hrs   Temp 97.5 °F (36.4 °C) Temp  Min: 97.5 °F (36.4 °C)  Max: 98.6 °F (37 °C)   Pulse (!) 42 Pulse  Min: 40  Max: 60   Resp 14 Resp  Min: 13  Max: 32   BP 97/54 BP  Min: 93/57  Max: 162/82   O2 Sat 99 % SpO2  Min: 97 %  Max: 100 %      Intake/Output Summary (Last 24 hours) at 4/20/2025 0624  Last data filed at 4/20/2025 0230  Gross per 24 hour   Intake 2932 ml   Output 3300 ml   Net -368 ml       Diet Dysphagia/Modified Consistency; Dysphagia 3-Dental Soft; Thin Liquid; Consistent Carbohydrate Diet  Level 2 (5 carb servings/75 grams CHO/meal), Sodium 4 GM (RYAN)    Invasive Monitoring           Physical Exam   Physical Exam  Eyes:      Pupils: Pupils are equal, round, and reactive to light.   Skin:     General: Skin is warm and dry.   HENT:      Nose: No nasal deformity.      Mouth/Throat:      Mouth: Mucous membranes are dry.   Cardiovascular:      Rate and Rhythm: Regular rhythm. Bradycardia present.   Musculoskeletal:      Right lower leg: No edema.      Left lower leg: No edema.   Abdominal:      Palpations: Abdomen is soft.      Tenderness: There is no abdominal tenderness.   Constitutional:       General: He is not in acute distress.     Interventions: He is sedated, intubated and restrained.   Pulmonary:      Effort: Pulmonary effort is normal. He is intubated.      Breath sounds: Normal breath sounds.   Neurological:      Mental Status: He is calm.   Genitourinary/Anorectal:  external catheter present.        Diagnostic Studies        Lab Results: I have reviewed the following results:     Medications:  Scheduled PRN   allopurinol, 300 mg, Daily  atorvastatin, 20 mg, Daily  bisacodyl, 10 mg, Daily  chlorhexidine, 15 mL, Q12H CAIT  [Held by provider] Cholecalciferol, 2,000 Units, Daily  [Held by provider] vitamin B-12, 1,000 mcg, Daily  dexamethasone, 4 mg, Q6H CAIT  [Held by provider] heparin (porcine), 5,000 Units, Q12H CAIT  insulin lispro, 5-25 Units, TID AC  insulin lispro, 5-25 Units, HS  leucovorin, 25 mg, Q6H  levETIRAcetam, 500 mg, Q12H CAIT  pantoprazole, 40 mg, Early Morning  polyethylene glycol, 17 g, Daily  senna, 2 tablet, HS      acetaminophen, 650 mg, Q6H PRN  alteplase, 2 mg, Q1MIN PRN  aluminum-magnesium hydroxide-simethicone, 30 mL, Q4H PRN  calcium carbonate, 1,000 mg, Daily PRN  fentaNYL, 50 mcg, Q1H PRN  HYDROmorphone, 0.2 mg, Q2H PRN  ondansetron, 4 mg, Q6H PRN  oxyCODONE, 2.5 mg, Q4H PRN   Or  oxyCODONE, 5 mg, Q4H PRN  sodium chloride, 20 mL/hr, Once PRN  sodium chloride, 20 mL/hr,  Once PRN       Continuous    propofol, 5-50 mcg/kg/min, Last Rate: 30 mcg/kg/min (04/20/25 0240)  sodium bicarbonate 100 mEq in dextrose 5 % 1,000 mL infusion, 150 mL/hr, Last Rate: 150 mL/hr (04/19/25 2300)         Labs:   CBC    Recent Labs     04/19/25  0719   WBC 10.29*   HGB 13.5   HCT 36.0*   PLT 87*     BMP    Recent Labs     04/19/25  0528 04/19/25  1821   SODIUM 133* 129*   K 4.9 3.9   CL 95* 92*   CO2 30 30   AGAP 8 7   BUN 19 22   CREATININE 0.87 0.86   CALCIUM 8.8 8.3*       Coags    No recent results     Additional Electrolytes  Recent Labs     04/19/25 0528   MG 2.2   PHOS 3.7          Blood Gas    No recent results  No recent results LFTs  No recent results    Infectious  No recent results  Glucose  Recent Labs     04/19/25  0528 04/19/25  1821   GLUC 174* 198*

## 2025-04-21 ENCOUNTER — APPOINTMENT (INPATIENT)
Dept: RADIOLOGY | Facility: HOSPITAL | Age: 65
DRG: 820 | End: 2025-04-21
Payer: COMMERCIAL

## 2025-04-21 PROBLEM — N17.9 AKI (ACUTE KIDNEY INJURY) (HCC): Status: ACTIVE | Noted: 2025-04-21

## 2025-04-21 LAB
ANION GAP SERPL CALCULATED.3IONS-SCNC: 11 MMOL/L (ref 4–13)
ATRIAL RATE: 49 BPM
BACTERIA UR QL AUTO: ABNORMAL /HPF
BACTERIA UR QL AUTO: ABNORMAL /HPF
BACTERIA UR QL AUTO: NORMAL /HPF
BILIRUB UR QL STRIP: NEGATIVE
BUN SERPL-MCNC: 37 MG/DL (ref 5–25)
CALCIUM SERPL-MCNC: 9.2 MG/DL (ref 8.4–10.2)
CHLORIDE SERPL-SCNC: 98 MMOL/L (ref 96–108)
CLARITY UR: ABNORMAL
CLARITY UR: ABNORMAL
CLARITY UR: CLEAR
CO2 SERPL-SCNC: 32 MMOL/L (ref 21–32)
COLOR UR: ABNORMAL
COLOR UR: ABNORMAL
COLOR UR: COLORLESS
CREAT SERPL-MCNC: 1.76 MG/DL (ref 0.6–1.3)
ERYTHROCYTE [DISTWIDTH] IN BLOOD BY AUTOMATED COUNT: 11.8 % (ref 11.6–15.1)
GFR SERPL CREATININE-BSD FRML MDRD: 39 ML/MIN/1.73SQ M
GLUCOSE SERPL-MCNC: 127 MG/DL (ref 65–140)
GLUCOSE SERPL-MCNC: 130 MG/DL (ref 65–140)
GLUCOSE SERPL-MCNC: 136 MG/DL (ref 65–140)
GLUCOSE SERPL-MCNC: 198 MG/DL (ref 65–140)
GLUCOSE SERPL-MCNC: 200 MG/DL (ref 65–140)
GLUCOSE SERPL-MCNC: 224 MG/DL (ref 65–140)
GLUCOSE UR STRIP-MCNC: ABNORMAL MG/DL
GLUCOSE UR STRIP-MCNC: ABNORMAL MG/DL
GLUCOSE UR STRIP-MCNC: NEGATIVE MG/DL
HCT VFR BLD AUTO: 41.7 % (ref 36.5–49.3)
HGB BLD-MCNC: 15.2 G/DL (ref 12–17)
HGB UR QL STRIP.AUTO: ABNORMAL
KETONES UR STRIP-MCNC: NEGATIVE MG/DL
LDH SERPL-CCNC: 194 U/L (ref 140–271)
LEUKOCYTE ESTERASE UR QL STRIP: NEGATIVE
MAGNESIUM SERPL-MCNC: 2.6 MG/DL (ref 1.9–2.7)
MCH RBC QN AUTO: 31.5 PG (ref 26.8–34.3)
MCHC RBC AUTO-ENTMCNC: 36.5 G/DL (ref 31.4–37.4)
MCV RBC AUTO: 87 FL (ref 82–98)
MTX SERPL-SCNC: 0.6 UMOL/L
MTX SERPL-SCNC: 5.8 UMOL/L
NITRITE UR QL STRIP: NEGATIVE
NON-SQ EPI CELLS URNS QL MICRO: ABNORMAL /HPF
NON-SQ EPI CELLS URNS QL MICRO: ABNORMAL /HPF
NON-SQ EPI CELLS URNS QL MICRO: NORMAL /HPF
NRBC BLD AUTO-RTO: 0 /100 WBCS
P AXIS: 35 DEGREES
PH UR STRIP.AUTO: 6.5 [PH]
PH UR STRIP.AUTO: 7.5 [PH]
PH UR STRIP.AUTO: 8 [PH]
PHOSPHATE SERPL-MCNC: 6.2 MG/DL (ref 2.3–4.1)
PLATELET # BLD AUTO: 142 THOUSANDS/UL (ref 149–390)
PMV BLD AUTO: 10.8 FL (ref 8.9–12.7)
POTASSIUM SERPL-SCNC: 3.5 MMOL/L (ref 3.5–5.3)
PR INTERVAL: 138 MS
PROT UR STRIP-MCNC: ABNORMAL MG/DL
QRS AXIS: 50 DEGREES
QRSD INTERVAL: 92 MS
QT INTERVAL: 471 MS
QTC INTERVAL: 426 MS
RBC # BLD AUTO: 4.82 MILLION/UL (ref 3.88–5.62)
RBC #/AREA URNS AUTO: ABNORMAL /HPF
RBC #/AREA URNS AUTO: ABNORMAL /HPF
RBC #/AREA URNS AUTO: NORMAL /HPF
SODIUM SERPL-SCNC: 141 MMOL/L (ref 135–147)
SP GR UR STRIP.AUTO: 1.01 (ref 1–1.03)
T WAVE AXIS: 47 DEGREES
T3FREE SERPL-MCNC: 2.08 PG/ML (ref 2.5–3.9)
T4 FREE SERPL-MCNC: 1.13 NG/DL (ref 0.61–1.12)
TSH SERPL DL<=0.05 MIU/L-ACNC: 0.02 UIU/ML (ref 0.45–4.5)
URATE SERPL-MCNC: 3.7 MG/DL (ref 3.5–8.5)
UROBILINOGEN UR STRIP-ACNC: <2 MG/DL
VENTRICULAR RATE: 49 BPM
WBC # BLD AUTO: 11 THOUSAND/UL (ref 4.31–10.16)
WBC #/AREA URNS AUTO: ABNORMAL /HPF
WBC #/AREA URNS AUTO: ABNORMAL /HPF
WBC #/AREA URNS AUTO: NORMAL /HPF

## 2025-04-21 PROCEDURE — 93010 ELECTROCARDIOGRAM REPORT: CPT | Performed by: INTERNAL MEDICINE

## 2025-04-21 PROCEDURE — 82948 REAGENT STRIP/BLOOD GLUCOSE: CPT

## 2025-04-21 PROCEDURE — 99233 SBSQ HOSP IP/OBS HIGH 50: CPT | Performed by: PSYCHIATRY & NEUROLOGY

## 2025-04-21 PROCEDURE — 99024 POSTOP FOLLOW-UP VISIT: CPT | Performed by: NEUROLOGICAL SURGERY

## 2025-04-21 PROCEDURE — 81001 URINALYSIS AUTO W/SCOPE: CPT | Performed by: INTERNAL MEDICINE

## 2025-04-21 PROCEDURE — 80048 BASIC METABOLIC PNL TOTAL CA: CPT

## 2025-04-21 PROCEDURE — NC001 PR NO CHARGE: Performed by: PSYCHIATRY & NEUROLOGY

## 2025-04-21 PROCEDURE — 99255 IP/OBS CONSLTJ NEW/EST HI 80: CPT | Performed by: INTERNAL MEDICINE

## 2025-04-21 PROCEDURE — 83615 LACTATE (LD) (LDH) ENZYME: CPT

## 2025-04-21 PROCEDURE — 80204 DRUG ASSAY METHOTREXATE: CPT

## 2025-04-21 PROCEDURE — 99233 SBSQ HOSP IP/OBS HIGH 50: CPT | Performed by: PHYSICIAN ASSISTANT

## 2025-04-21 PROCEDURE — 93005 ELECTROCARDIOGRAM TRACING: CPT

## 2025-04-21 PROCEDURE — 85027 COMPLETE CBC AUTOMATED: CPT

## 2025-04-21 PROCEDURE — 84481 FREE ASSAY (FT-3): CPT | Performed by: PSYCHIATRY & NEUROLOGY

## 2025-04-21 PROCEDURE — 84550 ASSAY OF BLOOD/URIC ACID: CPT

## 2025-04-21 PROCEDURE — 99233 SBSQ HOSP IP/OBS HIGH 50: CPT | Performed by: INTERNAL MEDICINE

## 2025-04-21 PROCEDURE — 97535 SELF CARE MNGMENT TRAINING: CPT

## 2025-04-21 PROCEDURE — 84439 ASSAY OF FREE THYROXINE: CPT

## 2025-04-21 PROCEDURE — 97116 GAIT TRAINING THERAPY: CPT

## 2025-04-21 PROCEDURE — 84443 ASSAY THYROID STIM HORMONE: CPT

## 2025-04-21 PROCEDURE — 97112 NEUROMUSCULAR REEDUCATION: CPT

## 2025-04-21 PROCEDURE — 84100 ASSAY OF PHOSPHORUS: CPT

## 2025-04-21 PROCEDURE — 83735 ASSAY OF MAGNESIUM: CPT

## 2025-04-21 PROCEDURE — 70450 CT HEAD/BRAIN W/O DYE: CPT

## 2025-04-21 RX ORDER — LEUCOVORIN CALCIUM 25 MG/1
50 TABLET ORAL EVERY 6 HOURS
Status: DISCONTINUED | OUTPATIENT
Start: 2025-04-21 | End: 2025-04-22

## 2025-04-21 RX ORDER — SODIUM CHLORIDE, SODIUM GLUCONATE, SODIUM ACETATE, POTASSIUM CHLORIDE, MAGNESIUM CHLORIDE, SODIUM PHOSPHATE, DIBASIC, AND POTASSIUM PHOSPHATE .53; .5; .37; .037; .03; .012; .00082 G/100ML; G/100ML; G/100ML; G/100ML; G/100ML; G/100ML; G/100ML
500 INJECTION, SOLUTION INTRAVENOUS ONCE
Status: COMPLETED | OUTPATIENT
Start: 2025-04-21 | End: 2025-04-21

## 2025-04-21 RX ORDER — INSULIN GLARGINE 100 [IU]/ML
10 INJECTION, SOLUTION SUBCUTANEOUS
Status: DISCONTINUED | OUTPATIENT
Start: 2025-04-21 | End: 2025-05-07 | Stop reason: HOSPADM

## 2025-04-21 RX ORDER — HEPARIN SODIUM 5000 [USP'U]/ML
5000 INJECTION, SOLUTION INTRAVENOUS; SUBCUTANEOUS EVERY 8 HOURS SCHEDULED
Status: DISCONTINUED | OUTPATIENT
Start: 2025-04-21 | End: 2025-05-07 | Stop reason: HOSPADM

## 2025-04-21 RX ORDER — OLANZAPINE 10 MG/1
10 TABLET, ORALLY DISINTEGRATING ORAL
Status: DISCONTINUED | OUTPATIENT
Start: 2025-04-21 | End: 2025-04-22

## 2025-04-21 RX ORDER — POTASSIUM CHLORIDE 1500 MG/1
40 TABLET, EXTENDED RELEASE ORAL ONCE
Status: COMPLETED | OUTPATIENT
Start: 2025-04-21 | End: 2025-04-21

## 2025-04-21 RX ADMIN — LEVETIRACETAM 500 MG: 500 TABLET, FILM COATED ORAL at 09:52

## 2025-04-21 RX ADMIN — DEXAMETHASONE SODIUM PHOSPHATE 4 MG: 4 INJECTION INTRA-ARTICULAR; INTRALESIONAL; INTRAMUSCULAR; INTRAVENOUS; SOFT TISSUE at 05:17

## 2025-04-21 RX ADMIN — DEXAMETHASONE SODIUM PHOSPHATE 4 MG: 4 INJECTION INTRA-ARTICULAR; INTRALESIONAL; INTRAMUSCULAR; INTRAVENOUS; SOFT TISSUE at 11:59

## 2025-04-21 RX ADMIN — ATORVASTATIN CALCIUM 20 MG: 20 TABLET, FILM COATED ORAL at 09:52

## 2025-04-21 RX ADMIN — SODIUM BICARBONATE 150 ML/HR: 84 INJECTION, SOLUTION INTRAVENOUS at 11:49

## 2025-04-21 RX ADMIN — PANTOPRAZOLE SODIUM 40 MG: 40 TABLET, DELAYED RELEASE ORAL at 05:17

## 2025-04-21 RX ADMIN — INSULIN LISPRO 10 UNITS: 100 INJECTION, SOLUTION INTRAVENOUS; SUBCUTANEOUS at 11:42

## 2025-04-21 RX ADMIN — SODIUM BICARBONATE 150 ML/HR: 84 INJECTION, SOLUTION INTRAVENOUS at 20:40

## 2025-04-21 RX ADMIN — INSULIN LISPRO 10 UNITS: 100 INJECTION, SOLUTION INTRAVENOUS; SUBCUTANEOUS at 17:31

## 2025-04-21 RX ADMIN — POTASSIUM CHLORIDE 40 MEQ: 1500 TABLET, EXTENDED RELEASE ORAL at 09:51

## 2025-04-21 RX ADMIN — SODIUM CHLORIDE, SODIUM GLUCONATE, SODIUM ACETATE, POTASSIUM CHLORIDE, MAGNESIUM CHLORIDE, SODIUM PHOSPHATE, DIBASIC, AND POTASSIUM PHOSPHATE 500 ML: .53; .5; .37; .037; .03; .012; .00082 INJECTION, SOLUTION INTRAVENOUS at 14:28

## 2025-04-21 RX ADMIN — POLYETHYLENE GLYCOL 3350 17 G: 17 POWDER, FOR SOLUTION ORAL at 09:52

## 2025-04-21 RX ADMIN — HEPARIN SODIUM 5000 UNITS: 5000 INJECTION INTRAVENOUS; SUBCUTANEOUS at 14:41

## 2025-04-21 RX ADMIN — DEXAMETHASONE SODIUM PHOSPHATE 4 MG: 4 INJECTION INTRA-ARTICULAR; INTRALESIONAL; INTRAMUSCULAR; INTRAVENOUS; SOFT TISSUE at 18:32

## 2025-04-21 RX ADMIN — BISACODYL 10 MG: 10 SUPPOSITORY RECTAL at 09:24

## 2025-04-21 RX ADMIN — DEXAMETHASONE SODIUM PHOSPHATE 4 MG: 4 INJECTION INTRA-ARTICULAR; INTRALESIONAL; INTRAMUSCULAR; INTRAVENOUS; SOFT TISSUE at 01:00

## 2025-04-21 RX ADMIN — ALLOPURINOL 300 MG: 300 TABLET ORAL at 09:51

## 2025-04-21 RX ADMIN — CHLORHEXIDINE GLUCONATE 0.12% ORAL RINSE 15 ML: 1.2 LIQUID ORAL at 10:07

## 2025-04-21 RX ADMIN — LEUCOVORIN CALCIUM 25 MG: 25 TABLET ORAL at 01:00

## 2025-04-21 NOTE — ASSESSMENT & PLAN NOTE
- may be secondary to relative hypotension in the setting of methotrexate (dosed 4/18)  - avoid nephrotoxins  - avoid hypotension  - UA: trace blood, 1+ protein, PH 7.5  - check PVR but on 4/16 it was 190ml which is acceptable

## 2025-04-21 NOTE — ASSESSMENT & PLAN NOTE
In the setting of the above  On exam, he is confused but interactive. Oriented to name only, does not recall , disoriented to all else.  EVD dislodged due to  agitation over the weekend

## 2025-04-21 NOTE — PROGRESS NOTES
"Progress Note - Palliative Care   Name: Alexis Dennison 64 y.o. male I MRN: [id number]  Unit/Bed#: ICU 03 I Date of Admission: 3/29/2025   Date of Service: 4/21/2025 I Hospital Day: 23     Assessment & Plan  Palliative care encounter  Palliative diagnosis: newly diagnosed CNS lymphoma    Symptom management:  No palliative symptom management needs today.  Will continue to monitor patient and treat accordingly.    Goals:  Level 1 code status  Disease focused care without limits placed  Patient initiated high-dose methotrexate + rituximab inpatient    Decisional apparatus:  Patient does not have capacity on exam today.  If capacity is lost, patient's substitute decision maker would default to spouse and adult children by [place] Act 169.  ER contacts:  Elizabeth Chester  Daughter  [phone]  Guy Naldo \"Naldo\" Phillip  Spouse  [phone]    Patient also has a son, Drew, and another daughter, Marina, who has down syndrome. Elizabteh is the main contact but family makes decisions as unit.    Advance Directive/Living Will/POLST: none on file    Social support:  Patient support system: spouse Naldo, 3 adult children, extended family  Patient's sister and niece present at bedside today.  Spouse Naldo lives locally and daughter Elizabeth is from [place] but Naldo prefers that she is primary contact due to language barrier (Chinese, unclear  dialect)  Plan to meet with Naldo, palliative SW, and  tomorrow for more in depth conversation  Supportive listening provided  Normalized experience of patient/family  Provided anticipatory guidance  Advocated for patient/family with interdisciplinary care team    Coordination of care:  Reviewed case with Dr. Rivera, ICU and Melissa RN    Follow up  Palliative Care will continue to follow for support and goals of care discussions will be ongoing pending course.  Please reach out via [employer] secure chat if questions or concerns arise.    We appreciate the invitation to be involved in this patient's care.   Brain " tumor (HCC)  Admitted 3/29 with worsening confusion in the setting of recent ED visit with CTA head and neck 3/24 demonstrating multiple nonspecific hyperdense ependymal/subependymal nodules, largest located adjacent to the foramen of Monro (2 x 1.1 x 1 cm ) and f/u outpatient MRI 3/26 demonstrating multiple scattered foci of subependymal nodular enhancement throughout ventricles (left worse than right) with mild hydrocephalus (left worse than right), scattered nodular areas of enhancement in left anterior inferior basal ganglia involving left anterior commissure, and smaller foci of nodular enhancement along anteromedial aspect of left cerebral peduncle and left quirino.with concern for primary CNS malignancy  Per sister, both patient's parents with history of lymphoma  CTH on admission 3/29 stable subependymal nodules and left foramen of Monro region hyperdense lesion, dilation of left lateral ventricle and minimal left to right midline shift  4/3 imaging concerning for worsening hydrocephalus  2 non diagnostic lumbar punctures performed  MRI brain  with interval disease progression   developed worsening mentation and CTH demonstrated predominantly left sided obstructive hydrocephalus from lesion located adjacent to the foramen of Monro, now s/p left front EVD   underwent left frontal endoscopic biopsy and replacement of EVD  Preliminary pathology large B-cell lymphoma    Medical oncology neurosurgery following  Primary CNS lymphoma  Appreciate oncology input  methotrexate and rituximab started this admission  Encephalopathy  In the setting of the above  On exam, he is confused but interactive. Oriented to name only, does not recall , disoriented to all else.  EVD dislodged due to  agitation over the weekend  Pulmonary nodule  CTA 3/29 demonstrates non specific 4 mm right lower lobe pulmonary nodule  Recommended 3 mo f/u  Liver lesion  CTA 3/29 demonstrates non specific 12 mm hypodense lesion within  right hepatic lobe, MRI recommended  MRI 3/30 demonstrates no suspicious hepatic lesions, simple right hepatic lobe cyst noted    Interval history:       Over the weekend patient dislodged EVD. However, it did not require replacement due to low ICPs/output. He was electively intubated for MRI with sedation and extubated successfully. Initiated methotrexate, tolerating so far. Intrathecal methotrexate deferred at this time.    Patient is calm but confused during time of encounter. UE restraints in place due to impulsivity and attempt to get up unassisted earlier. During first encounter he is disoriented, but answers questions often with incorrect answers. Upon returning family at bedside confirms confusion such as patient stating he was only  for a year and a half, but it has been much longer. 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     MEDICATIONS / ALLERGIES:     all current active meds have been reviewed    No Known Allergies    OBJECTIVE:    Physical Exam  Physical Exam  HENT:      Head:      Comments: Previous EVD site closed, CDI  Eyes:      Conjunctiva/sclera: Conjunctivae normal.   Cardiovascular:      Rate and Rhythm: Normal rate.   Pulmonary:      Effort: No respiratory distress.   Abdominal:      Tenderness: There is no guarding.   Skin:     General: Skin is warm and dry.   Neurological:      Mental Status: He is alert.      Comments: Alert, oriented to self only. confused   Psychiatric:      Comments: Calm during time of encounter but impulsive and trying to get OOB without assistance earlier.         Lab Results:   Results from last 7 days   Lab Units 04/21/25  0439 04/20/25  0534 04/19/25  0719 04/18/25  0512 04/17/25  0621 04/16/25  0511   WBC Thousand/uL 11.00* 7.23 10.29* 10.59* 6.88 11.53*   HEMOGLOBIN g/dL 15.2 13.6 13.5 13.9 13.7 13.3   HEMATOCRIT % 41.7 37.3 36.0* 38.8 39.1 36.7   PLATELETS Thousands/uL 142* 106* 87* 140* 149 145*   SEGS PCT %  --   --  89*  --   81* 87*   MONO PCT %  --   --  5 0* 5 4   EOS PCT %  --   --  0 0 0 0     Results from last 7 days   Lab Units 04/21/25  0439 04/20/25  0534 04/19/25  1821 04/18/25  0512 04/17/25  0621 04/16/25  0511   POTASSIUM mmol/L 3.5 3.5 3.9   < > 4.1 4.0   CHLORIDE mmol/L 98 95* 92*   < > 104 107   CO2 mmol/L 32 34* 30   < > 26 23   BUN mg/dL 37* 20 22   < > 23 17   CREATININE mg/dL 1.76* 0.93 0.86   < > 0.76 0.71   CALCIUM mg/dL 9.2 8.8 8.3*   < > 9.1 8.8   ALK PHOS U/L  --   --   --   --  44 42   ALT U/L  --   --   --   --  33 37   AST U/L  --   --   --   --  11* 15    < > = values in this interval not displayed.       Imaging Studies: reviewed pertinent studies   EKG, Pathology, and Other Studies: reviewed pertinent studies    Counseling / Coordination of Care    Total floor / unit time spent today 35+ minutes. Greater than 50% of total time was spent with the patient and / or family counseling and / or coordination of care. A description of the counseling / coordination of care: psychosocial support, chart review, imaging review, lab review, supportive listening, anticipatory guidance, and coordination with primary team and RN

## 2025-04-21 NOTE — PROGRESS NOTES
Progress Note - Critical Care/ICU   Name: Alexis Dennison 64 y.o. male I MRN: [id number]  Unit/Bed#: ICU 03 I Date of Admission: 3/29/2025   Date of Service: 4/21/2025 I Hospital Day: 23      Assessment & Plan   Active Hospital Problems    Diagnosis Date Noted POA    Brain tumor (HCC) 03/29/2025 Yes    Goals of care, counseling/discussion 04/16/2025 Not Applicable    Palliative care encounter 04/16/2025 Not Applicable    Primary CNS lymphoma 04/16/2025 Unknown    Encephalopathy 04/02/2025 Yes    Constipation 04/01/2025 No    Ambulatory dysfunction 04/01/2025 Yes    Thyroid nodule 03/30/2025 Yes    Pulmonary nodule 03/30/2025 Yes    Liver lesion 03/30/2025 Yes    Type 2 diabetes mellitus with hyperglycemia, without long-term current use of insulin (HCC) 06/14/2022 Yes     Chronic    Mixed hyperlipidemia 06/14/2022 Yes     Chronic    HTN, goal below 130/80 06/14/2022 Yes     Chronic      Resolved Hospital Problems    Diagnosis Date Noted Date Resolved POA    Foot erythema 04/12/2025 04/14/2025 Unknown    Abnormal CT scan 03/29/2025 03/30/2025 Yes     Neuro/Psych:  Diagnoses: Left basal ganglia mass w/ surrounding edema S/P biopsy w/ concern for CNS Large B Cell Lymphoma; Obstructive hydrocephalus s/p EVD; Small IVH; Encephalopathy; pituitary lesion   Imaging  4/11 MRI: Interval enlargement of the dominant left anterior basal ganglionic mass lesion with greater surrounding vasogenic edema and mass effect. Redemonstrated findings of leptomeningeal and intraventricular seeding with obstructive hydrocephalus and transependymal flow of CSF   4/13 CT head: largely unchanged left sided obstructive hydrocephalus, blood products within the occipital horn of the left lateral ventricle is similar from the MRI brain 4/11. Left anterior basal ganglia mass with surrounding edema, regional mass effect, and effacement of the left suprasellar cistern, appears similar to MRI of the brain 4/11/2025 4/20 CT Head, MRI Brain, C/T/L spine  pending.  4/13 S/P Bedside EVD placement for obstructive hydrocephalus on 4/13 4/14 S/P Biopsy of brain mass via Neurosurgery and replaced EVD  4/19 EVD pulled by patient  4/20 - MRI brain - Decreased enhancement and perfusion metrics associated with the left gangliocapsular mass, decreased mass effect on the lateral ventricles and decreased transependymal flow of CSF.  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   4/21 - CTH -  per neurosx note - significant interval decrease in overall tumor burden with decrease in size of ventricular and left basal ganglia lesions, significant reduction in ventriculomegaly and transependymal edema, no longer any midline shift or trapped ventricle, third ventricle and Foramen of Monro now clearly visible on coronal sequences  Plan:  Neurosurgery -  CTH improved - no need of shunting; signed off today  Decadron 4 mg q6 hours  Keppra 500mg BID for seizure ppx   Repeat CTH for any change in GCS > 2 points in 1 hour   Neuro checks every 2 hours  Goal BP <160  Pituitary lesion - consider nonemergent MRI pituitary   Analgesia: Multimodal.  Tylenol 650 mg as needed for mild pain; oxycodone 2.5/5 for moderate to severe pain; Dilaudid 0.2 mg for breakthrough pain     CV:  Diagnoses: HTN, HLD, sinus bradycardia  Home: HCTZ- losartan  mg OD   Plan:  Hold home bp meds - resume as BP allows   Continue lipitor 20 daily  Continuous cardiopulmonary monitoring. Maintain MAP >65.     Pulm:  Diagnoses: Elective intubation  Vent: SCMV Vt 450/FiO2 40/PEEP 6. Daily SAT/SBT. Wean FiO2 and PEEP as tolerated.   Plan:  Likely extubation today.  Continuous pulse oximetry. Maintain O2 sat >92%.      GI:  Diagnoses: GERD  Plan:  Bowel regimen: Senna, Miralax, dulcolax suppository  GI prophylaxis: Continue PPI protonix 40 daily  N: consider re-starting diet as no procedure per neurosx. Dysphagia 3 dental soft, thin liquid  and carb control, sodium 4 gm      /Renal:  Diagnoses: LETY on CKD 2  Baseline creatinine: .08-.09  Today Creat 1.76 - meets criteria for LETY  BUN:creat ration > 20; likely pre-renal    Plan:  Monitor I/Os, renal function.  Consider IVF      F/E/N:  Diagnoses:  Plan:   F: consider Fluid resuscitation prn.  E: Monitor and replete electrolytes for Mg >2, Phos >3, K >4.  N:  resume dysphagia diet 3, as no procedure per neurosx      Heme/Onc:  Diagnoses: CNS Large B Cell Lymphoma  Today CBC - looks hemo-concentrated   Plan:  Oncology following, appreciate recommendations  Rituximab, high-dose methotrexate & intrathecal methotrexate  Leucovorin rescue q6 hours  Continue allopurinol daily  Off bicarb drip  Continue to trend Urine pH Q6  Monitor uric acid and K/Ca/PO for TLS  VTE prophylaxis: SCDs; consider starting SQ hep as no procedure/ shunt per neurosx       Endo:  Diagnoses: DM2  Home: metformin 1000 mg OD, Janumet OD  BS range: 127-225  SSI coverage total - 30 U  Plan:   Insulin: SSI. Monitor blood glucose.  Consider lantus      ID:  Diagnoses: no active issue   Plan:  Monitor fever curve and WBC.     MSK/Skin:  Diagnoses: no active issue  Plan:  PT/OT when appropriate.   Encourage OOB and ambulation when appropriate.   Local wound care prn.     LDAs:  Lines - R PICC line        Disposition: Critical care    ICU Core Measures     A: Assess, Prevent, and Manage Pain Has pain been assessed? Yes  Need for changes to pain regimen? No   B: Both SAT/SAT  N/A   C: Choice of Sedation RASS Goal: 0 Alert and Calm or +1 Restless  Need for changes to sedation or analgesia regimen? No   D: Delirium CAM-ICU: Negative   E: Early Mobility  Plan for early mobility? Yes   F: Family Engagement Plan for family engagement today? Yes       Review of Invasive Devices:      Central access plan: Medications requiring central line      Prophylaxis:  VTE Contraindicated secondary to: was on hold for probable shunt today. Can resume as no  shunt per neurosx  )   Stress Ulcer  covered byomeprazole (PriLOSEC) 20 mg delayed release capsule [[id number]] (Long-Term Med), pantoprazole (PROTONIX) EC tablet 40 mg [[id number]]         24 Hour Events : none.  Subjective       Objective :                   Vitals I/O      Most Recent Min/Max in 24hrs   Temp 97.8 °F (36.6 °C) Temp  Min: 97.8 °F (36.6 °C)  Max: 98.6 °F (37 °C)   Pulse (!) 52 Pulse  Min: 44  Max: 80   Resp 22 Resp  Min: 13  Max: 41   /67 BP  Min: 91/50  Max: 141/75   O2 Sat 98 % SpO2  Min: 91 %  Max: 100 %      Intake/Output Summary (Last 24 hours) at 4/21/2025 0713  Last data filed at 4/21/2025 0400  Gross per 24 hour   Intake 1774.05 ml   Output 2345 ml   Net -570.95 ml       Diet NPO    Invasive Monitoring           Physical Exam   Physical Exam  Eyes:      Extraocular Movements: Extraocular movements intact.      Conjunctiva/sclera: Conjunctivae normal.   HENT:      Mouth/Throat:      Mouth: Mucous membranes are moist.   Cardiovascular:      Rate and Rhythm: Regular rhythm. Bradycardia present.      Pulses: Normal pulses.   Musculoskeletal:      Right lower leg: No edema.      Left lower leg: No edema.   Abdominal:      Palpations: Abdomen is soft.      Tenderness: There is no abdominal tenderness.   Constitutional:       Appearance: He is well-developed.      Interventions: He is restrained.   Pulmonary:      Effort: Pulmonary effort is normal.      Breath sounds: Normal breath sounds.   Neurological:      General: No focal deficit present.      Mental Status: He is calm, disoriented to place and disoriented to time.      Cranial Nerves: No facial asymmetry.      Motor: Strength full and intact in all extremities.      Comments: Confused   Follows commands   Intermittent hiccups           Diagnostic Studies        Lab Results: I have reviewed the following results:     Medications:  Scheduled PRN   allopurinol, 300 mg, Daily  atorvastatin, 20 mg, Daily  bisacodyl, 10 mg,  Daily  chlorhexidine, 15 mL, Q12H CAIT  [Held by provider] Cholecalciferol, 2,000 Units, Daily  [Held by provider] vitamin B-12, 1,000 mcg, Daily  dexamethasone, 4 mg, Q6H CAIT  insulin lispro, 5-25 Units, TID AC  insulin lispro, 5-25 Units, HS  levETIRAcetam, 500 mg, Q12H CAIT  OLANZapine, 10 mg, HS  pantoprazole, 40 mg, Early Morning  polyethylene glycol, 17 g, Daily  senna, 2 tablet, HS      acetaminophen, 650 mg, Q6H PRN  alteplase, 2 mg, Q1MIN PRN  aluminum-magnesium hydroxide-simethicone, 30 mL, Q4H PRN  calcium carbonate, 1,000 mg, Daily PRN  HYDROmorphone, 0.2 mg, Q2H PRN  ondansetron, 4 mg, Q6H PRN  oxyCODONE, 2.5 mg, Q4H PRN   Or  oxyCODONE, 5 mg, Q4H PRN  sodium chloride, 20 mL/hr, Once PRN  sodium chloride, 20 mL/hr, Once PRN       Continuous          Labs:   CBC    Recent Labs     04/20/25  0534 04/21/25  0439   WBC 7.23 11.00*   HGB 13.6 15.2   HCT 37.3 41.7   * 142*     BMP    Recent Labs     04/20/25  0534 04/21/25  0439   SODIUM 135 141   K 3.5 3.5   CL 95* 98   CO2 34* 32   AGAP 6 11   BUN 20 37*   CREATININE 0.93 1.76*   CALCIUM 8.8 9.2       Coags    No recent results     Additional Electrolytes  Recent Labs     04/20/25  0534 04/21/25  0437 04/21/25  0439   MG 2.2  --  2.6   PHOS 3.9 6.2*  --           Blood Gas    No recent results  No recent results LFTs  No recent results    Infectious  No recent results  Glucose  Recent Labs     04/19/25  1821 04/20/25  0534 04/21/25  0439   GLUC 198* 222* 136

## 2025-04-21 NOTE — ASSESSMENT & PLAN NOTE
L frontal endoscopic biopsy of ventricular mass and replacement of EVD by Dr. Causey on 4/14/25  Subependymal lesions with large left basal ganglia suspicious for lymphoma.  P/w confusion, short term memory loss.  Patient had multiple nondiagnostic LPs and hematology/oncology was requesting a biopsy to confirm.  4/13 - patient had worsening mental status change and CT head demonstrated progressive ventriculomegaly and likely trapped left lateral ventricle, ultimately a left frontal EVD was placed.  4/14 - patient self extubated.  Preliminary pathology, small round blue cell tumor; differential Dx includes lymphoma   EVD replaced during surgery on 4/14  Patient pulled out EVD over weekend, but ICP's were low for > 24 hours after clamping    Imaging:  CTH 4/21: significant interval decrease in overall tumor burden with decrease in size of ventricular and left basal ganglia lesions, significant reduction in ventriculomegaly and transependymal edema, no longer any midline shift or trapped  ventricle, third ventricle and Foramen of Monro now clearly visible on coronal sequences    Plan:   No clinical concern or evidence for hydrocephalus s/p EVD removal, significant improvement in ventriculomegaly on repeat CTH overnight, no clinical or radiographic indication for VPS placement, I will cancel for today (my teams updated, daughter Elizabeth called)  Final pathology: primary CNS non-Hodgkin's lymphoma, diffuse large B-cell lymphoma   Oncology recommendations for treatment:   Plan is for treatment with the following regimen:   Rituximab 375 mg/m² intravenously yesterday  Initial infusion of high-dose methotrexate 8 g/m² intravenously over 4 hours today  Initiate intrathecal cytarabine with cycle 1 of high-dose methotrexate.  Continue intrathecal Daria-C every 2 weeks x 4  On decadron  MRI spinal axis negative  Keppra only for postoperative seizure ppx  SBP<160  DVT ppx: SCDs, SQH BID   PT/OT   Social work following for assistance  with dispo once medically cleared      Neurosurgery will sign off at this time. No need for outpatient follow-up since path was already discussed (started treatment inpatient) and sutures absorbable.

## 2025-04-21 NOTE — PROGRESS NOTES
Progress Note - Oncology-Medical   Name: Alexis Dennison 64 y.o. male I MRN: [id number]  Unit/Bed#: ICU 03 I Date of Admission: 3/29/2025   Date of Service: 4/21/2025 I Hospital Day: 23    Assessment & Plan  Brain tumor (HCC)  Admitted 3/29 with worsening confusion since prior ED presentation and outpatient imaging  Seen at [facility] ED on 3/24 with 3 week history of altered mental status, memory difficulty  CTH 3/24 demonstrated multiple hyperdense ependyma/subependymal nodules  MRI brain 3/26 performed outpatient demonstrating multiple scattered foci of subependymal nodular enhancement with mild hydrocephalus, scattered nodular areas of enhancement in left anterior inferior basal ganglia dn foci of nodular enhancement along anteromedial aspect of left cerebral peduncle and left quirino  CTH 3/29 (this admission)  Stable subependymal nodules and left formamen of De Oliveira region hyperdense lesion with dilation of left lateral ventrical and minimal left to right midline shift  S/p LP 3/31  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   MRI Brain (4/11) showed progression of disease from 3/30 to 4/11 with interval enlargement of the dominant left anterior basal ganglionic mass with greater surrounding vasogenic edema and mass effect and persistent leptomeningeal and intraventricular seeding with obstructive hydrocephalus--current differential lymphoma versus high-grade glioma less likely metastasis  4/13: continued deterioration in mental status, CT Head 4/13 demonstrated left sided obstructive  hydrocephalus from lesion located adjacent to formamen of Monro, similar to CT on 4/3; blood products within occiptial horn of left lateral ventricle similar to MRI 4/11; left anterior basal ganglia mass with surrounding edema, regional mass effect and effacement of left suprasellar cistern, similar to MRI 4/11.  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)  Ophthalmology ambulatory referral placed for discharge (no slit-lamp exam available inpatient)  Discussed with radiation oncology - no indication for radiation at this time  Patient has been persistently bradycardia, asymptomatic at this time, will continue to be monitored in ICU at this time as EVD remains in place  MRI C/T/L (4/20) with no evidence of malignancy    Plan:  High-dose methotrexate (8 g/m²) every 2 weeks (C1 received 4/18, C2 planned 5/1)  Methotrexate levels 5.8 (24 hours) > 0.6 (48 hours) > pending (72 hours)  Leucovorin rescue: Increase leucovorin from 25 to 50 mg every 6 hours given supratherapeutic methotrexate levels  Coordinated IVF with nephrology and critical care team.  Recommend maintaining urine pH >= 7.0 to avoid methotrexate crystallization in renal tubules  Consulted nephrology given LETY (SCr 0.93-1.76) possibly due to high-dose methotrexate  Rituximab (375 mg/m²) weekly  Daily lab monitoring for TLS - CBC, CMP, Phos, LDH, Uric acid  Final path is pending from biopsy (4/14)  Encephalopathy  Most likely d/t brain mass, see interim A/P above  Pulmonary nodule  CTA 3/29 with nonspecific 4 mm RLL pulm nodule, recommendation for 3 month follow up  Liver lesion  CTA 3/29 with  nonspecific 12mm hypodense right hepatic lesion, recommended MRI abdomen  MRI 3/30 with no suspicious hepatic lesions, demonstrated simple right hepatic lobe cyst  Primary CNS lymphoma  See management in brain tumor   LETY (acute kidney injury) (HCC)  Consulted nephrology    Subjective   NAEON, no new complaints, patient responds somewhat appropriately to most questions, MRI C/T/L-spine negative for evidence of malignancy, vitals continue to show borderline bradycardia, MTX levels decreased from 5.8 at 24 hours to 0.6 at 48 hours, UPH decreased from 8.0-7.5, and SCR increased from 0.93-1.76, phosphorus 6.2, uric acid 3.7, and     Objective :  Temp:  [97.7 °F (36.5 °C)-98.6 °F (37 °C)] 97.8 °F (36.6 °C)  HR:  [48-68] 48  BP: ()/(52-75) 119/68  Resp:  [15-38] 16  SpO2:  [94 %-99 %] 97 %  O2 Device: None (Room air)    Physical Exam  General: AAOx1-2, ill-appearing, confused, no acute distress  HEENT: well-healing surgical scars, no ventricular drain, PERRLA, moist mucosa  Respiratory: CTAB w/o wheezes, rales, or rhonchi, no increased work of breathing  Cardiovascular: regular rhythm, bradycardia, w/o murmurs, 2+ radial and pedal pulses, no b/l LE edema  Abdomen: soft, non-tender, non-distended, no hepatomegaly or splenomegaly  /Rectal: deferred  Musculoskeletal: moves all four extremities with normal strength and ROM  Integumentary: warm, dry, no rash or bruising  Neurological: confused, reduced coordination  Psychiatric: pleasant and cooperative with normal mood, affect, and cognition      Lab Results: I have reviewed the following results:CBC/BMP:   .     04/21/25  0437 04/21/25  0439   WBC  --  11.00*   HGB  --  15.2   HCT  --  41.7   PLT  --  142*   SODIUM  --  141   K  --  3.5   CL  --  98   CO2  --  32   BUN  --  37*   CREATININE  --  1.76*   GLUC  --  136   MG  --  2.6   PHOS 6.2*  --     , PTT/INR:No new results in last 24 hours.   Lab Results   Component Value Date    K 3.5 04/21/2025    CL 98  04/21/2025    CO2 32 04/21/2025    BUN 37 (H) 04/21/2025    CREATININE 1.76 (H) 04/21/2025    GLUF 131 (H) 02/22/2025    CALCIUM 9.2 04/21/2025    AST 11 (L) 04/17/2025    ALT 33 04/17/2025    ALKPHOS 44 04/17/2025    EGFR 39 04/21/2025     Lab Results   Component Value Date    WBC 11.00 (H) 04/21/2025    HGB 15.2 04/21/2025    HCT 41.7 04/21/2025    MCV 87 04/21/2025     (L) 04/21/2025     Lab Results   Component Value Date    NEUTROABS 9.16 (H) 04/19/2025     Imaging Results Review: I reviewed radiology reports from this admission including: MRI spine.  Other Study Results Review: No additional pertinent studies reviewed.    Administrative Statements   I have spent a total time of 45 minutes in caring for this patient on the day of the visit/encounter including Diagnostic results, Prognosis, Risks and benefits of tx options, Instructions for management, Patient and family education, Importance of tx compliance, Risk factor reductions, Impressions, Counseling / Coordination of care, Documenting in the medical record, Reviewing/placing orders in the medical record (including tests, medications, and/or procedures), Obtaining or reviewing history  , and Communicating with other healthcare professionals .    Additional recommendations to follow per attending, Dr. Gallegos.    Bryson Garcia DO, PGY4  Hematology/Oncology Fellow

## 2025-04-21 NOTE — OCCUPATIONAL THERAPY NOTE
Occupational Therapy Progress Note     Patient Name: Alexis Dennison  Today's Date: 4/21/2025  Problem List  Principal Problem:    Brain tumor (HCC)  Active Problems:    Type 2 diabetes mellitus with hyperglycemia, without long-term current use of insulin (HCC)    HTN, goal below 130/80    Mixed hyperlipidemia    Thyroid nodule    Pulmonary nodule    Liver lesion    Constipation    Ambulatory dysfunction    Encephalopathy    Goals of care, counseling/discussion    Palliative care encounter    Primary CNS lymphoma    LETY (acute kidney injury) (HCC)          04/21/25 1000   OT Last Visit   OT Visit Date 04/21/25   Note Type   Note Type Treatment   Pain Assessment   Pain Assessment Tool 0-10   Pain Score No Pain   Restrictions/Precautions   Weight Bearing Precautions Per Order No   Other Precautions Cognitive;Chair Alarm;Bed Alarm;Restraints;Multiple lines;Fall Risk   Lifestyle   Autonomy I with ADL's/iaDL's, no AD with functional mobility +drives   Reciprocal Relationships spouse, children   Service to Others full time employement   Intrinsic Gratification reading   ADL   Eating Assistance 4  Minimal Assistance   Grooming Assistance 3  Moderate Assistance   UB Bathing Assistance 3  Moderate Assistance   LB Bathing Assistance 2  Maximal Assistance   UB Dressing Assistance 3  Moderate Assistance   LB Dressing Assistance 2  Maximal Assistance   Toileting Assistance  Unable to assess   Bed Mobility   Supine to Sit 3  Moderate assistance   Transfers   Sit to Stand 3  Moderate assistance   Stand to Sit 3  Moderate assistance   Functional Mobility   Functional Mobility 3  Moderate assistance   Additional Comments x2   Additional items Hand hold assistance   Subjective   Subjective offers no c/o   Cognition   Overall Cognitive Status Impaired   Arousal/Participation Arousable;Cooperative   Attention Attends with cues to redirect   Orientation Level Disoriented to place;Disoriented to time;Disoriented to situation;Oriented to  person   Memory Decreased short term memory;Decreased recall of recent events;Decreased recall of precautions   Following Commands Follows one step commands with increased time or repetition   Comments pt able to match cards (numbers) 4/4 correct but only able to verbalize number on card 2/4 correct (expressive communication deficit vs cognitive limitations)   Activity Tolerance   Activity Tolerance Patient limited by fatigue;Treatment limited secondary to medical complications (Comment)   Medical Staff Made Aware PT, RN   Assessment   Assessment Pt seen for OT session focusing on self care, functional mob, dynamic balance/safety, cognition and overall tolerance to activity - more awake, alert and interactive today - able to follow simple commands, continued expressive deficits noted - functionally required mod a to max assist for adls and mod a x 1 for transfers and assist x 2 for functional mobility - able to participate in cognitive task of matching cards for short period of time - able to visually match cards but difficulty noted with verbalizing number on card.  Continue to recommend Level I resources post d/c - OT to continue to follow to address goals as stated on eval   Plan   Treatment Interventions ADL retraining;Functional transfer training;Visual perceptual retraining;Endurance training;Cognitive reorientation;Patient/family training;Equipment evaluation/education;Compensatory technique education;Activityengagement   Goal Expiration Date 04/29/25   OT Treatment Day 5   OT Frequency 3-5x/wk   Discharge Recommendation   Rehab Resource Intensity Level, OT I (Maximum Resource Intensity)   AM-PAC Daily Activity Inpatient   Lower Body Dressing 2   Bathing 2   Toileting 2   Upper Body Dressing 3   Grooming 3   Eating 3   Daily Activity Raw Score 15   Daily Activity Standardized Score (Calc for Raw Score >=11) 34.69   AM-PAC Applied Cognition Inpatient   Following a Speech/Presentation 1   Understanding Ordinary  Conversation 3   Taking Medications 2   Remembering Where Things Are Placed or Put Away 2   Remembering List of 4-5 Errands 1   Taking Care of Complicated Tasks 1   Applied Cognition Raw Score 10   Applied Cognition Standardized Score 24.98   End of Consult   Education Provided Yes   Patient Position at End of Consult Bedside chair;Bed/Chair alarm activated;All needs within reach   Nurse Communication Nurse aware of consult       The patient's raw score on the AM-PAC Daily Activity Inpatient Short Form is 15. A raw score of less than 19 suggests the patient may benefit from discharge to post-acute rehabilitation services. Please refer to the recommendation of the Occupational Therapist for safe discharge planning.    Documentation Completed By:    BERT Shi/L  [employer] Certified - [id number]

## 2025-04-21 NOTE — PLAN OF CARE
Problem: PAIN - ADULT  Goal: Verbalizes/displays adequate comfort level or baseline comfort level  Description: Interventions:- Encourage patient to monitor pain and request assistance- Assess pain using appropriate pain scale- Administer analgesics based on type and severity of pain and evaluate response- Implement non-pharmacological measures as appropriate and evaluate response- Notify physician/advanced practitioner if interventions unsuccessful or patient reports new pain  Outcome: Progressing     Problem: INFECTION - ADULT  Goal: Absence or prevention of progression during hospitalization  Description: INTERVENTIONS:- Assess and monitor for signs and symptoms of infection- Monitor lab/diagnostic results- Monitor all insertion sites, i.e. indwelling lines, tubes, and drains- Independence appropriate cooling/warming therapies per order- Administer medications as ordered- Instruct and encourage patient and family to use good hand hygiene technique- Identify and instruct in appropriate isolation precautions for identified infection/condition  Outcome: Progressing     Problem: SAFETY ADULT  Goal: Patient will remain free of falls  Description: INTERVENTIONS:- Educate patient/family on patient safety including physical limitations- Instruct patient to call for assistance with activity - Consult OT/PT to assist with strengthening/mobility - Keep Call bell within reach- Keep bed low and locked with side rails adjusted as appropriate- Keep care items and personal belongings within reach- Initiate and maintain comfort rounds- Make Fall Risk Sign visible to staff- Offer Toileting every 2 Hours, in advance of need- Initiate/Maintain bed/chair alarm- Obtain necessary fall risk management equipment.- Apply yellow socks and bracelet for high fall risk patients- Consider moving patient to room near nurses station  INTERVENTIONS:- Educate patient/family on patient safety including physical limitations- Instruct patient to call  for assistance with activity - Consult OT/PT to assist with strengthening/mobility - Keep Call bell within reach- Keep bed low and locked with side rails adjusted as appropriate- Keep care items and personal belongings within reach- Initiate and maintain comfort rounds- Make Fall Risk Sign visible to staff- Offer Toileting every 2 Hours, in advance of need- Initiate/Maintain Bed/Chair alarm- Obtain necessary fall risk management equipment  - Apply yellow socks and bracelet for high fall risk patients- Consider moving patient to room near nurses station  Outcome: Progressing  Goal: Maintain or return to baseline ADL function  Description: INTERVENTIONS:-  Assess patient's ability to carry out ADLs; assess patient's baseline for ADL function and identify physical deficits which impact ability to perform ADLs (bathing, care of mouth/teeth, toileting, grooming, dressing, etc.)- Assess/evaluate cause of self-care deficits - Assess range of motion- Assess patient's mobility; develop plan if impaired- Assess patient's need for assistive devices and provide as appropriate- Encourage maximum independence but intervene and supervise when necessary- Involve family in performance of ADLs- Assess for home care needs following discharge - Consider OT consult to assist with ADL evaluation and planning for discharge- Provide patient education as appropriate  Outcome: Progressing  Goal: Maintains/Returns to pre admission functional level  Description: INTERVENTIONS:- Perform AM-PAC 6 Click Basic Mobility/ Daily Activity assessment daily.- Set and communicate daily mobility goal to care team and patient/family/caregiver. - Collaborate with rehabilitation services on mobility goals if consulted- Reposition patient every 2 hours.- Dangle patient 3 times a day- Stand patient 3 times a day- Ambulate patient 3 times a day- Out of bed to chair 3 times a day - Out of bed for meals 3 times a day- Out of bed for toileting- Record patient  progress and toleration of activity level   Outcome: Progressing     Problem: DISCHARGE PLANNING  Goal: Discharge to home or other facility with appropriate resources  Description: INTERVENTIONS:- Identify barriers to discharge w/patient and caregiver- Arrange for needed discharge resources and transportation as appropriate- Identify discharge learning needs (meds, wound care, etc.)- Refer to Case Management Department for coordinating discharge planning if the patient needs post-hospital services based on physician/advanced practitioner order or complex needs related to functional status, cognitive ability, or social support system  Outcome: Progressing     Problem: Knowledge Deficit  Goal: Patient/family/caregiver demonstrates understanding of disease process, treatment plan, medications, and discharge instructions  Description: Complete learning assessment and assess knowledge base.Interventions:- Provide teaching at level of understanding- Provide teaching via preferred learning methods  Outcome: Progressing     Problem: NEUROSENSORY - ADULT  Goal: Achieves stable or improved neurological status  Description: INTERVENTIONS- Monitor and report changes in neurological status- Monitor vital signs such as temperature, blood pressure, glucose, and any other labs ordered - Initiate measures to prevent increased intracranial pressure- Monitor for seizure activity and implement precautions if appropriate    INTERVENTIONS- Monitor and report changes in neurological status- Monitor vital signs such as temperature, blood pressure, glucose, and any other labs ordered - Initiate measures to prevent increased intracranial pressure- Monitor for seizure activity and implement precautions if appropriate    Outcome: Progressing  Goal: Remains free of injury related to seizures activity  Description: INTERVENTIONS- Maintain airway, patient safety  and administer oxygen as ordered- Monitor patient for seizure activity, document and  report duration and description of seizure to physician/advanced practitioner- If seizure occurs,  ensure patient safety during seizure- Reorient patient post seizure- Seizure pads on all 4 side rails- Instruct patient/family to notify RN of any seizure activity including if an aura is experienced- Instruct patient/family to call for assistance with activity based on nursing assessment- Administer anti-seizure medications if ordered  INTERVENTIONS- Maintain airway, patient safety  and administer oxygen as ordered- Monitor patient for seizure activity, document and report duration and description of seizure to physician/advanced practitioner- If seizure occurs,  ensure patient safety during seizure- Reorient patient post seizure- Seizure pads on all 4 side rails- Instruct patient/family to notify RN of any seizure activity including if an aura is experienced- Instruct patient/family to call for assistance with activity based on nursing assessment- Administer anti-seizure medications if ordered  Outcome: Progressing  Goal: Achieves maximal functionality and self care  Description: INTERVENTIONS- Monitor swallowing and airway patency with patient fatigue and changes in neurological status- Encourage and assist patient to increase activity and self care. - Encourage visually impaired, hearing impaired and aphasic patients to use assistive/communication devices  INTERVENTIONS- Monitor swallowing and airway patency with patient fatigue and changes in neurological status- Encourage and assist patient to increase activity and self care. - Encourage visually impaired, hearing impaired and aphasic patients to use assistive/communication devices  Outcome: Progressing     Problem: METABOLIC, FLUID AND ELECTROLYTES - ADULT  Goal: Glucose maintained within target range  Description: INTERVENTIONS:- Monitor Blood Glucose as ordered- Assess for signs and symptoms of hyperglycemia and hypoglycemia- Administer ordered medications to  maintain glucose within target range- Assess nutritional intake and initiate nutrition service referral as needed  INTERVENTIONS:- Monitor Blood Glucose as ordered- Assess for signs and symptoms of hyperglycemia and hypoglycemia- Administer ordered medications to maintain glucose within target range- Assess nutritional intake and initiate nutrition service referral as needed  Outcome: Progressing  Goal: Electrolytes maintained within normal limits  Description: INTERVENTIONS:- Monitor labs and assess patient for signs and symptoms of electrolyte imbalances- Administer electrolyte replacement as ordered- Monitor response to electrolyte replacements, including repeat lab results as appropriate- Instruct patient on fluid and nutrition as appropriate  Outcome: Progressing  Goal: Fluid balance maintained  Description: INTERVENTIONS:- Monitor labs - Monitor I/O and WT- Instruct patient on fluid and nutrition as appropriate- Assess for signs & symptoms of volume excess or deficit  Outcome: Progressing     Problem: Prexisting or High Potential for Compromised Skin Integrity  Goal: Skin integrity is maintained or improved  Description: INTERVENTIONS:- Identify patients at risk for skin breakdown- Assess and monitor skin integrity- Assess and monitor nutrition and hydration status- Monitor labs - Assess for incontinence - Turn and reposition patient- Assist with mobility/ambulation- Relieve pressure over bony prominences- Avoid friction and shearing- Provide appropriate hygiene as needed including keeping skin clean and dry- Evaluate need for skin moisturizer/barrier cream- Collaborate with interdisciplinary team - Patient/family teaching- Consider wound care consult   Outcome: Progressing     Problem: SAFETY,RESTRAINT: NV/NON-SELF DESTRUCTIVE BEHAVIOR  Goal: Remains free of harm/injury (restraint for non violent/non self-detsructive behavior)  Description: INTERVENTIONS:- Instruct patient/family regarding restraint use -  Assess and monitor physiologic and psychological status - Provide interventions and comfort measures to meet assessed patient needs - Identify and implement measures to help patient regain control- Assess readiness for release of restraint   Outcome: Progressing  Goal: Returns to optimal restraint-free functioning  Description: INTERVENTIONS:- Assess the patient's behavior and symptoms that indicate continued need for restraint- Identify and implement measures to help patient regain control- Assess readiness for release of restraint   Outcome: Progressing     Problem: CARDIOVASCULAR - ADULT  Goal: Maintains optimal cardiac output and hemodynamic stability  Description: INTERVENTIONS:- Monitor I/O, vital signs and rhythm- Monitor for S/S and trends of decreased cardiac output- Administer and titrate ordered vasoactive medications to optimize hemodynamic stability- Assess quality of pulses, skin color and temperature- Assess for signs of decreased coronary artery perfusion- Instruct patient to report change in severity of symptoms  Outcome: Progressing  Goal: Absence of cardiac dysrhythmias or at baseline rhythm  Description: INTERVENTIONS:- Continuous cardiac monitoring, vital signs, obtain 12 lead EKG if ordered- Administer antiarrhythmic and heart rate control medications as ordered- Monitor electrolytes and administer replacement therapy as ordered  Outcome: Progressing     Problem: GASTROINTESTINAL - ADULT  Goal: Maintains or returns to baseline bowel function  Description: INTERVENTIONS:- Assess bowel function- Encourage oral fluids to ensure adequate hydration- Administer IV fluids if ordered to ensure adequate hydration- Administer ordered medications as needed- Encourage mobilization and activity- Consider nutritional services referral to assist patient with adequate nutrition and appropriate food choices  Outcome: Progressing  Goal: Maintains adequate nutritional intake  Description: INTERVENTIONS:- Monitor  percentage of each meal consumed- Identify factors contributing to decreased intake, treat as appropriate- Assist with meals as needed- Monitor I&O, weight, and lab values if indicated- Obtain nutrition services referral as needed  Outcome: Progressing     Problem: GENITOURINARY - ADULT  Goal: Maintains or returns to baseline urinary function  Description: INTERVENTIONS:- Assess urinary function- Encourage oral fluids to ensure adequate hydration if ordered- Administer IV fluids as ordered to ensure adequate hydration- Administer ordered medications as needed- Offer frequent toileting- Follow urinary retention protocol if ordered  Outcome: Progressing  Goal: Absence of urinary retention  Description: INTERVENTIONS:- Assess patient’s ability to void and empty bladder- Monitor I/O- Bladder scan as needed- Discuss with physician/AP medications to alleviate retention as needed- Discuss catheterization for long term situations as appropriate  Outcome: Progressing     Problem: SKIN/TISSUE INTEGRITY - ADULT  Goal: Skin Integrity remains intact(Skin Breakdown Prevention)  Description: Assess:-Perform [place] assessment every shift -Clean and moisturize skin every shift -Inspect skin when repositioning, toileting, and assisting with ADLS-Assess under medical devices every shift  -Assess extremities for adequate circulation and sensation Bed Management:-Have minimal linens on bed & keep smooth, unwrinkled-Change linens as needed when moist or perspiring-Avoid sitting or lying in one position for more than 2 hours while in bed-Keep HOB at 30 degrees Toileting:-Offer bedside commode-Assess for incontinence every 2 hours -Use incontinent care products after each incontinent episode    Activity:-Mobilize patient 2 times a day-Encourage activity and walks on unit-Encourage or provide ROM exercises -Turn and reposition patient every 2 Hours-Use appropriate equipment to lift or move patient in bed-Instruct/ Assist with weight  shifting every 30 minutes when out of bed in chair-Consider limitation of chair time 4 hour intervalsSkin Care:-Avoid use of baby powder, tape, friction and shearing, hot water or constrictive clothing-Relieve pressure over bony prominences   -Do not massage red bony areasNext Steps:-Teach patient strategies to minimize risks  -Consider consults to  interdisciplinary teams   Outcome: Progressing  Goal: Incision(s), wounds(s) or drain site(s) healing without S/S of infection  Description: INTERVENTIONS- Assess and document dressing, incision, wound bed, drain sites and surrounding tissue- Provide patient and family education- Perform skin care/dressing changes every shift  Outcome: Progressing     Problem: HEMATOLOGIC - ADULT  Goal: Maintains hematologic stability  Description: INTERVENTIONS- Assess for signs and symptoms of bleeding or hemorrhage- Monitor labs- Administer supportive blood products/factors as ordered and appropriate  Outcome: Progressing     Problem: MUSCULOSKELETAL - ADULT  Goal: Maintain or return mobility to safest level of function  Description: INTERVENTIONS:- Assess patient's ability to carry out ADLs; assess patient's baseline for ADL function and identify physical deficits which impact ability to perform ADLs (bathing, care of mouth/teeth, toileting, grooming, dressing, etc.)- Assess/evaluate cause of self-care deficits - Assess range of motion- Assess patient's mobility- Assess patient's need for assistive devices and provide as appropriate- Encourage maximum independence but intervene and supervise when necessary- Involve family in performance of ADLs- Assess for home care needs following discharge - Consider OT consult to assist with ADL evaluation and planning for discharge- Provide patient education as appropriate  Outcome: Progressing     Problem: Nutrition/Hydration-ADULT  Goal: Nutrient/Hydration intake appropriate for improving, restoring or maintaining nutritional  needs  Description: Monitor and assess patient's nutrition/hydration status for malnutrition. Collaborate with interdisciplinary team and initiate plan and interventions as ordered.  Monitor patient's weight and dietary intake as ordered or per policy. Utilize nutrition screening tool and intervene as necessary. Determine patient's food preferences and provide high-protein, high-caloric foods as appropriate. INTERVENTIONS:- Monitor oral intake, urinary output, labs, and treatment plans- Assess nutrition and hydration status and recommend course of action- Evaluate amount of meals eaten- Assist patient with eating if necessary - Allow adequate time for meals- Recommend/ encourage appropriate diets, oral nutritional supplements, and vitamin/mineral supplements- Order, calculate, and assess calorie counts as needed- Recommend, monitor, and adjust tube feedings and TPN/PPN based on assessed needs- Assess need for intravenous fluids- Provide specific nutrition/hydration education as appropriate- Include patient/family/caregiver in decisions related to nutrition  Outcome: Progressing     Problem: COPING  Goal: Pt/Family able to verbalize concerns and demonstrate effective coping strategies  Description: INTERVENTIONS:- Assist patient/family to identify coping skills, available support systems and cultural and spiritual values- Provide emotional support, including active listening and acknowledgement of concerns of patient and caregivers- Reduce environmental stimuli, as able- Provide patient education- Assess for spiritual pain/suffering and initiate spiritual care, including notification of Pastoral Care or [facility]ie based community as needed- Assess effectiveness of coping strategies  Outcome: Progressing  Goal: Will report anxiety at manageable levels  Description: INTERVENTIONS:- Administer medication as ordered- Teach and encourage coping skills- Provide emotional support- Assess patient/family for anxiety and ability to  cope  Outcome: Progressing     Problem: Potential for Falls  Goal: Patient will remain free of falls  Description: INTERVENTIONS:- Educate patient/family on patient safety including physical limitations- Instruct patient to call for assistance with activity - Consult OT/PT to assist with strengthening/mobility - Keep Call bell within reach- Keep bed low and locked with side rails adjusted as appropriate- Keep care items and personal belongings within reach- Initiate and maintain comfort rounds- Make Fall Risk Sign visible to staff- Offer Toileting every 2 Hours, in advance of need- Initiate/Maintain bed/chair alarm- Obtain necessary fall risk management equipment.- Apply yellow socks and bracelet for high fall risk patients- Consider moving patient to room near nurses station  INTERVENTIONS:- Educate patient/family on patient safety including physical limitations- Instruct patient to call for assistance with activity - Consult OT/PT to assist with strengthening/mobility - Keep Call bell within reach- Keep bed low and locked with side rails adjusted as appropriate- Keep care items and personal belongings within reach- Initiate and maintain comfort rounds- Make Fall Risk Sign visible to staff- Offer Toileting every 2 Hours, in advance of need- Initiate/Maintain Bed/Chair alarm- Obtain necessary fall risk management equipment  - Apply yellow socks and bracelet for high fall risk patients- Consider moving patient to room near nurses station  Outcome: Progressing

## 2025-04-21 NOTE — UTILIZATION REVIEW
Continued Stay Review    Date: 4/21/25                           Current Patient Class: Inpatient  Current Level of Care: Level 1 Stepdown     HPI:64 y.o. male initially admitted on 3/29/25    Current Diagnosis:Brain tumor     Assessment/Plan:   Confused; follows commands; Low; tachypnea; Today Creat 1.76 - meets criteria for LETY; BUN:creat ration > 20; likely pre-renal; Mental Status: He is calm, disoriented to place and disoriented to time.   Per Neurosurgery -  CTH improved - no need of shunting; signed off today  4/20 - MRI brain - Decreased enhancement and perfusion metrics associated with the left gangliocapsular mass, decreased mass effect on the lateral ventricles and decreased transependymal flow of CSF.  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   4/21 - CTH -  per neurosx note - significant interval decrease in overall tumor burden with decrease in size of ventricular and left basal ganglia lesions, significant reduction in ventriculomegaly and transependymal edema, no longer any midline shift or trapped ventricle, third ventricle and Foramen of Monro now clearly visible on coronal sequences  Plan: cont decadron iv; cont BiCarb gtt; monitor labs; Accuchecks with ssic; CM following        Medications:   Scheduled Medications:  allopurinol, 300 mg, Oral, Daily  atorvastatin, 20 mg, Oral, Daily  bisacodyl, 10 mg, Rectal, Daily  chlorhexidine, 15 mL, Mouth/Throat, Q12H CAIT  [Held by provider] Cholecalciferol, 2,000 Units, Oral, Daily  dexamethasone, 4 mg, Intravenous, Q6H CAIT  insulin lispro, 5-25 Units, Subcutaneous, TID AC  insulin lispro, 5-25 Units, Subcutaneous, HS  levETIRAcetam, 500 mg, Oral, Q12H CAIT  OLANZapine, 10 mg, Oral, HS  pantoprazole, 40 mg, Oral, Early Morning  polyethylene glycol, 17 g, Oral, Daily  senna, 2 tablet, Oral, HS      Continuous IV Infusions:  sodium bicarbonate 100 mEq in dextrose 5 % 1,000 mL  infusion, 150 mL/hr, Intravenous, Continuous      PRN Meds:  acetaminophen, 650 mg, Oral, Q6H PRN  alteplase, 2 mg, Intracatheter, Q1MIN PRN  aluminum-magnesium hydroxide-simethicone, 30 mL, Oral, Q4H PRN  calcium carbonate, 1,000 mg, Oral, Daily PRN  HYDROmorphone, 0.2 mg, Intravenous, Q2H PRN  ondansetron, 4 mg, Intravenous, Q6H PRN  oxyCODONE, 2.5 mg, Oral, Q4H PRN   Or  oxyCODONE, 5 mg, Oral, Q4H PRN  sodium chloride, 20 mL/hr, Intravenous, Once PRN  sodium chloride, 20 mL/hr, Intravenous, Once PRN      Discharge Plan: TBD    Vital Signs (last 3 days)       Date/Time Temp Pulse Resp BP MAP (mmHg) SpO2 FiO2 (%) O2 Device Patient Position - Orthostatic VS ICP Mean (mmHg) CPP Cuff-Calculated (mmHg) [place] Coma Scale Score Pain    04/21/25 0905 97.7 °F (36.5 °C) 60 20 117/75 92 96 % -- None (Room air) -- -- -- -- --    04/21/25 0805 -- 56 20 125/71 87 95 % -- None (Room air) -- -- -- -- --    04/21/25 0703 -- 52 27 137/71 97 -- -- -- -- -- -- -- --    04/21/25 0603 -- 50 18 124/69 90 98 % -- -- -- -- -- -- --    04/21/25 0600 -- -- -- -- -- -- -- -- -- -- -- 14 --    04/21/25 0503 -- 52 22 128/67 86 -- -- -- -- -- -- -- --    04/21/25 0400 97.8 °F (36.6 °C) 56 24 136/71 108 98 % -- -- -- -- -- 14 --    04/21/25 0300 -- 54 28 141/75 96 -- -- -- -- -- -- -- --    04/21/25 0200 -- 52 27 135/75 93 97 % -- -- -- -- -- 14 --    04/21/25 0100 -- 62 24 116/72 91 -- -- -- -- -- -- 14 --    04/21/25 0000 -- 56 38 125/62 83 -- -- -- -- -- -- -- --    04/20/25 2300 -- 54 26 116/67 89 95 % -- -- -- -- -- -- --    04/20/25 2200 -- 54 20 118/69 89 -- -- -- -- -- -- 14 --    04/20/25 2100 -- 56 26 118/66 88 94 % -- -- -- -- -- -- --    04/20/25 2000 98.6 °F (37 °C) 58 28 109/66 84 95 % -- None (Room air) Lying -- -- 14 No Pain    04/20/25 1900 -- 62 20 95/52 65 94 % -- -- -- -- -- -- --    04/20/25 1800 -- 70 22 91/50 69 95 % -- -- -- -- -- 14 --    04/20/25 1710 -- 58 20 116/64 85 96 % -- -- -- -- -- -- --    04/20/25 1600 98.1  °F (36.7 °C) 64 24 105/62 74 94 % -- -- -- -- -- 14 --    04/20/25 1505 -- 64 26 104/61 81 95 % -- -- -- -- -- -- --    04/20/25 1405 -- 80 24 -- -- 91 % -- -- -- -- -- -- --    04/20/25 1400 -- -- -- -- -- -- -- -- -- -- -- 14 --    04/20/25 1300 -- 74 25 -- -- 98 % -- -- -- -- -- -- --    04/20/25 1207 97.9 °F (36.6 °C) 54 41 126/68 92 95 % -- -- -- -- -- -- --    04/20/25 1200 -- -- -- -- -- -- -- -- -- -- -- 14 No Pain    04/20/25 1105 -- 50 23 126/64 91 97 % -- -- -- -- -- -- --    04/20/25 1005 -- 48 25 123/56 85 97 % -- -- -- -- -- -- --    04/20/25 1000 -- -- -- -- -- -- -- -- -- -- -- 14 --    04/20/25 0905 -- 54 25 140/69 91 98 % -- -- -- -- -- -- --    04/20/25 0816 98 °F (36.7 °C) 44 13 128/67 93 100 % -- -- -- -- -- 7 --    04/20/25 0705 -- 44 13 104/62 82 100 % -- -- -- -- -- -- --    04/20/25 0635 -- 50 33 140/77 96 100 % -- -- -- -- -- -- --    04/20/25 0605 -- 58 21 137/55 78 99 % -- -- -- -- -- -- --    04/20/25 0600 -- -- -- -- -- -- -- -- -- -- -- 7 --    04/20/25 0544 -- 42 14 93/57 70 100 % -- -- -- -- -- -- --    04/20/25 0537 -- 42 14 85/50 68 100 % -- -- -- -- -- -- --    04/20/25 0505 -- 42 14 101/58 81 100 % -- -- -- -- -- -- --    04/20/25 0500 -- -- -- -- -- -- -- -- -- -- -- 7 --    04/20/25 0435 -- 46 19 122/66 96 100 % -- -- -- -- -- -- --    04/20/25 0405 97 °F (36.1 °C) 40 14 104/59 82 98 % -- -- -- -- -- -- --    04/20/25 0400 -- -- -- -- -- -- -- -- -- -- -- 7 --    04/20/25 0335 -- 42 28 119/63 92 100 % -- -- -- -- -- -- --    04/20/25 0305 -- 42 14 97/54 73 99 % -- -- -- -- -- -- --    04/20/25 0215 -- 50 20 129/69 107 100 % -- -- -- -- -- -- --    04/20/25 0210 -- -- -- -- -- 100 % -- -- -- -- -- -- --    04/20/25 0200 -- -- -- -- -- -- -- -- -- -- -- 7 --    04/20/25 0100 -- 40 14 111/61 73 99 % -- -- -- -- -- -- --    04/20/25 0001 -- -- -- -- -- -- -- -- -- -- -- 7 --    04/20/25 0000 -- 40 14 101/49 62 100 % -- -- -- -- -- -- --    04/19/25 2310 -- 42 15 130/73 87 100 % --  -- -- -- -- -- --    04/19/25 2250 -- -- -- -- -- 100 % -- -- -- -- -- -- --    04/19/25 2200 -- -- -- -- -- -- -- -- -- -- -- 7 --    04/19/25 2142 -- 54 32 114/88 102 100 % -- -- -- -- -- -- --    04/19/25 2110 -- 40 14 107/63 81 100 % -- -- -- -- -- -- --    04/19/25 2100 -- 42 -- -- -- -- -- -- -- -- -- -- --    04/19/25 2014 -- -- -- -- -- 99 % -- -- -- -- -- -- --    04/19/25 2010 97.5 °F (36.4 °C) 40 14 100/62 74 100 % 40 Ventilator Lying -- -- -- --    04/19/25 2000 -- -- -- -- -- -- -- -- -- -- -- 7 --    04/19/25 1900 -- 44 17 107/66 78 100 % -- -- -- -- -- -- --    04/19/25 1800 -- 50 19 141/81 123 99 % -- -- -- -- -- 7 --    04/19/25 1715 -- 60 17 162/82 103 99 % -- -- -- -- -- -- Med Not Given for Pain - for MAR use only    04/19/25 1700 -- 54 17 138/78 103 100 % -- -- -- -- -- -- --    04/19/25 1600 98.6 °F (37 °C) 52 20 123/67 90 97 % -- -- -- -- -- 14 --    04/19/25 1500 -- 48 18 116/63 84 98 % -- -- -- -- -- -- --    04/19/25 1400 -- 54 18 93/57 68 -- -- -- -- -- -- 14 --    04/19/25 1300 -- 58 17 116/52 75 99 % -- -- -- 5 mmHg 70 -- --    04/19/25 1200 -- 44 13 133/67 85 98 % -- -- -- -3 mmHg 88 14 --    04/19/25 1100 -- 52 21 123/65 84 98 % -- -- -- 3 mmHg 81 -- --    04/19/25 1000 -- 48 18 147/65 93 100 % -- -- -- 4 mmHg 89 14 --    04/19/25 0915 98.2 °F (36.8 °C) 48 21 127/74 97 100 % -- -- -- 3 mmHg 94 -- --    04/19/25 0800 -- -- -- -- -- -- -- -- -- -- -- 14 No Pain    04/19/25 0700 -- 60 26 138/65 89 99 % -- -- -- 10 mmHg 79 -- --    04/19/25 0600 -- 60 21 156/78 112 98 % -- -- -- 10 mmHg 102 14 --    04/19/25 0500 -- 42 19 156/79 122 98 % -- -- -- 12 mmHg 110 -- --    04/19/25 0400 -- 46 24 136/74 95 99 % -- -- -- 4 mmHg 91 14 No Pain    04/19/25 0300 -- 48 16 150/82 113 96 % -- -- -- 5 mmHg 108 -- --    04/19/25 0200 -- 46 26 150/79 115 99 % -- -- -- 8 mmHg 107 14 --    04/19/25 0100 -- 44 25 154/78 121 95 % -- -- -- 8 mmHg 113 -- --    04/19/25 0000 -- 50 20 146/82 98 98 % -- -- -- 8 mmHg  90 14 No Pain    04/18/25 2300 -- 44 33 132/61 87 98 % -- -- -- -4 mmHg 91 -- --    04/18/25 2200 -- 40 18 107/49 67 95 % -- -- -- 0 mmHg 67 14 --    04/18/25 2100 -- 42 20 149/81 109 98 % -- -- -- 1 mmHg 108 -- --    04/18/25 2000 98 °F (36.7 °C) 38 21 155/69 97 97 % -- None (Room air) Lying 0 mmHg 97 14 No Pain    04/18/25 1900 -- 50 18 151/74 101 -- -- -- -- 6 mmHg 95 -- --    04/18/25 1700 -- 46 24 151/84 129 -- -- -- -- 7 mmHg 122 -- --    04/18/25 0600 -- 46 21 154/73 93 97 % -- -- -- 5 mmHg 88 14 --    04/18/25 0500 -- 46 22 162/87 113 97 % -- -- -- 5 mmHg 108 -- --    04/18/25 0400 97.5 °F (36.4 °C) 46 26 159/78 106 97 % -- None (Room air) Lying 5 mmHg 101 14 No Pain    04/18/25 0200 -- 46 18 155/83 104 96 % -- -- -- -1 mmHg 105 14 --    04/18/25 0100 -- 46 25 -- -- 96 % -- -- -- 0 mmHg -- -- --    04/18/25 0000 97.7 °F (36.5 °C) 46 20 157/76 103 96 % -- None (Room air) Lying 0 mmHg 103 14 No Pain          Pertinent Labs/Diagnostic Results:   Radiology:  CT head wo contrast   Final Interpretation by Satnam Riojas MD (04/21 6295)      1. Increasing ventricular caliber without evidence of an acute, decompensated hydrocephalus. Recommend continued close follow-up.   2. Improving intraventricular blood products.      The study was marked in EPIC for immediate notification.                  Workstation performed: [id number]         CT head wo contrast   Final Interpretation by Michael Campbell MD (04/20 1651)      1.  Interval removal of left frontal approach ventriculostomy catheter, noting stable size and configuration of the ventricular system, which contains intraventricular blood products. Blood products also noted along the site of the prior catheter tract.   2.  Refer to concurrent brain MRI for characterization of hypoattenuation corresponding to T2/FLAIR signal abnormality involving the left gangliocapsular region extending into the left frontal lobe inferiorly.      The study was marked in  EPIC for immediate notification.            Workstation performed: [id number]         MRI Brain BT w wo Contrast   Final Interpretation by Michael Campbell MD (04/20 0937)   Within the confines of a motion-degraded examination:      1.  Decreased enhancement and perfusion metrics associated with the left gangliocapsular mass (biopsy-proven lymphoma), noting decreased mass effect on the lateral ventricles and decreased transependymal flow of CSF. The change from MRI dated 4/11/2025    could be related to medical therapy. See narrative above for full discussion.   2.  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   3.  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 This could be further evaluated on nonemergent MRI pituitary protocol.      The study was marked in EPIC for immediate notification.      Workstation performed: [id number]         MRI thoracic spine w wo contrast   Final Interpretation by Michael Campbell MD (04/20 0957)      1.  No evidence of lymphomatous involvement of the spine.   2.  Multilevel spondylotic changes, as detailed above. See narrative above for full details.   3.  Multiple large left thyroid nodules, for which further evaluation with thyroid sonogram is recommended if not previously performed.      The study was marked in EPIC for immediate notification.         Workstation performed: [id number]         MRI lumbar spine w wo contrast   Final Interpretation by Michael Campbell MD (04/20 0957)      1.  No evidence of lymphomatous involvement of the spine.   2.  Multilevel spondylotic changes, as detailed above. See narrative above for full details.   3.  Multiple large left thyroid nodules, for which further evaluation with thyroid sonogram is recommended if not previously performed.      The study was marked in EPIC for immediate notification.         Workstation performed:  [id number]         MRI cervical spine w wo contrast   Final Interpretation by Michael Campbell MD (04/20 0957)      1.  No evidence of lymphomatous involvement of the spine.   2.  Multilevel spondylotic changes, as detailed above. See narrative above for full details.   3.  Multiple large left thyroid nodules, for which further evaluation with thyroid sonogram is recommended if not previously performed.      The study was marked in EPIC for immediate notification.         Workstation performed: [id number]         XR chest portable ICU   Final Interpretation by Yamel Patton MD (04/21 0946)      No acute cardiopulmonary disease.      ET tube 3 cm above the arnav.            Workstation performed: [id number]         MRI follow up neuro   Final Interpretation by Michael Campbell MD (04/18 1212)      Nondiagnostic examination. Recommend repeat examination, as clinically appropriate/tolerable.            Workstation performed: [id number]             Results from last 7 days   Lab Units 04/21/25  0439 04/20/25  0534 04/19/25  0719 04/18/25  0512 04/17/25  0621 04/16/25  0511   WBC Thousand/uL 11.00* 7.23 10.29* 10.59* 6.88 11.53*   HEMOGLOBIN g/dL 15.2 13.6 13.5 13.9 13.7 13.3   HEMATOCRIT % 41.7 37.3 36.0* 38.8 39.1 36.7   PLATELETS Thousands/uL 142* 106* 87* 140* 149 145*   TOTAL NEUT ABS Thousands/µL  --   --  9.16*  --  5.54 9.97*        Results from last 7 days   Lab Units 04/21/25  0439 04/21/25  0437 04/20/25  0534 04/19/25  1821 04/19/25  0528 04/18/25  0512 04/17/25  0621   SODIUM mmol/L 141  --  135 129* 133* 134* 136   POTASSIUM mmol/L 3.5  --  3.5 3.9 4.9 3.7 4.1   CHLORIDE mmol/L 98  --  95* 92* 95* 97 104   CO2 mmol/L 32  --  34* 30 30 29 26   ANION GAP mmol/L 11  --  6 7 8 8 6   BUN mg/dL 37*  --  20 22 19 18 23   CREATININE mg/dL 1.76*  --  0.93 0.86 0.87 0.74 0.76   EGFR ml/min/1.73sq m 39  --  85 90 90 96 95   CALCIUM mg/dL 9.2  --  8.8 8.3* 8.8 9.1 9.1   MAGNESIUM mg/dL 2.6  --  2.2  --  2.2 1.9  2.1   PHOSPHORUS mg/dL  --  6.2* 3.9  --  3.7 3.1 3.3     Results from last 7 days   Lab Units 04/21/25  0709 04/21/25  0510 04/20/25  2252 04/20/25  1601 04/20/25  1207 04/20/25  0912 04/19/25  2217 04/19/25  1634 04/19/25  1143 04/19/25  0716 04/18/25  2223 04/18/25  1557   POC GLUCOSE mg/dl 130 127 169* 225* 186* 216* 146* 305* 255* 190* 181* 245*     Results from last 7 days   Lab Units 04/21/25  0439 04/20/25  0534 04/19/25  1821 04/19/25  0528 04/18/25  0512 04/17/25  0621 04/16/25  0511 04/15/25  0530   GLUCOSE RANDOM mg/dL 136 222* 198* 174* 240* 187* 169* 176*        Results from last 7 days   Lab Units 04/19/25  0528 04/16/25  1031 04/15/25  1453   HEP B S AG   --  Non-reactive Non-reactive   HEP C AB   --   --  Non-reactive   HEP B C IGM  Non-reactive  --  Non-reactive   HEP B C TOTAL AB  Non-reactive  --   --      Results from last 7 days   Lab Units 04/21/25  0437 04/21/25  0054 04/20/25  1748   CLARITY UA  Turbid Turbid Turbid   COLOR UA  Colorless Light Yellow Colorless   SPEC GRAV UA  1.007 1.007 1.007   PH UA  7.5 8.0 8.0   GLUCOSE UA mg/dl Negative Trace* Trace*   KETONES UA mg/dl Negative Negative Negative   BLOOD UA  Trace* Trace* Trace*   PROTEIN UA mg/dl Trace* 30 (1+)* 50 (1+)*   NITRITE UA  Negative Negative Negative   BILIRUBIN UA  Negative Negative Negative   UROBILINOGEN UA (BE) mg/dl <2.0 <2.0 <2.0   LEUKOCYTES UA  Negative Negative Negative   WBC UA /hpf 1-2 None Seen 4-10*   RBC UA /hpf 1-2 2-4* 1-2   BACTERIA UA /hpf Occasional Innumerable* Occasional   EPITHELIAL CELLS WET PREP /hpf None Seen None Seen None Seen       Network Utilization Review Department  ATTENTION: Please call with any questions or concerns to [phone] and carefully listen to the prompts so that you are directed to the right person. All voicemails are confidential.   For Discharge needs, contact Care Management DC Support Team at [phone] opt. 2  Send all requests for admission clinical reviews, approved or  denied determinations and any other requests to dedicated fax number below belonging to the campus where the patient is receiving treatment. List of dedicated fax numbers for the Facilities:  FACILITY NAME UR FAX NUMBER   ADMISSION DENIALS (Administrative/Medical Necessity) [phone]   DISCHARGE SUPPORT TEAM (NETWORK) [phone]   PARENT CHILD HEALTH (Maternity/NICU/Pediatrics) [phone]   General acute hospital [phone]   [facility] [phone]   [facility] [phone]   [facility] [phone]   [facility] [phone]   [facility] [phone]   [facility] [phone]   [facility] [phone]   [facility] [phone]   [facility] [phone]   [facility] [phone]   [facility] [phone]

## 2025-04-21 NOTE — ASSESSMENT & PLAN NOTE
"Palliative diagnosis: newly diagnosed CNS lymphoma    Symptom management:  No palliative symptom management needs today.  Will continue to monitor patient and treat accordingly.    Goals:  Level 1 code status  Disease focused care without limits placed  Patient initiated high-dose methotrexate + rituximab inpatient    Decisional apparatus:  Patient does not have capacity on exam today.  If capacity is lost, patient's substitute decision maker would default to spouse and adult children by [place] Act 169.  ER contacts:  Elizabeth Chester  Daughter  [phone]  Guy Naldo \"Naldo\" Phillip  Spouse  [phone]    Patient also has a son, Drew, and another daughter, Marina, who has down syndrome. Elizabeth is the main contact but family makes decisions as unit.    Advance Directive/Living Will/POLST: none on file    Social support:  Patient support system: spouse Naldo, 3 adult children, extended family  Patient's sister and niece present at bedside today.  Spouse Naldo lives locally and daughter Elizabeth is from [place] but Naldo prefers that she is primary contact due to language barrier (Chinese, unclear  dialect)  Plan to meet with Naldo, palliative SW, and  tomorrow for more in depth conversation  Supportive listening provided  Normalized experience of patient/family  Provided anticipatory guidance  Advocated for patient/family with interdisciplinary care team    Coordination of care:  Reviewed case with Dr. Rivera, ICU and Melissa RN    Follow up  Palliative Care will continue to follow for support and goals of care discussions will be ongoing pending course.  Please reach out via [employer] secure chat if questions or concerns arise.    We appreciate the invitation to be involved in this patient's care.   "

## 2025-04-21 NOTE — PHYSICAL THERAPY NOTE
PHYSICAL THERAPY NOTE          Patient Name: Alexis Dennison  Today's Date: 4/21/2025 04/21/25 1001   PT Last Visit   PT Visit Date 04/21/25   Note Type   Note Type Treatment   Pain Assessment   Pain Assessment Tool 0-10   Pain Score No Pain   Pain Rating: FLACC (Rest) - Face 0   Pain Rating: FLACC (Rest) - Legs 0   Pain Rating: FLACC (Rest) - Activity 0   Pain Rating: FLACC (Rest) - Cry 0   Pain Rating: FLACC (Rest) - Consolability 0   Score: FLACC (Rest) 0   Pain Rating: FLACC (Activity) - Face 1   Pain Rating: FLACC (Activity) - Legs 1   Pain Rating: FLACC (Activity) - Activity 1   Pain Rating: FLACC (Activity) - Cry 0   Pain Rating: FLACC (Activity) - Consolability 1   Score: FLACC (Activity) 4   Restrictions/Precautions   Weight Bearing Precautions Per Order No   Other Precautions Pain;Fall Risk;Telemetry;Multiple lines;Bed Alarm;Chair Alarm;Cognitive;Impulsive   General   Chart Reviewed Yes   Family/Caregiver Present No   Cognition   Orientation Level Oriented to person   Subjective   Subjective Pt agreeable to PT session   Bed Mobility   Supine to Sit 3  Moderate assistance   Additional items Assist x 1   Sit to Supine Unable to assess   Additional Comments Pt left resting in chair, call bell in reach, chair alarm active, restraints in place   Transfers   Sit to Stand 3  Moderate assistance   Additional items Assist x 1  (additional person close for safety)   Stand to Sit 3  Moderate assistance   Additional items Assist x 1  (additional person close for safety)   Ambulation/Elevation   Gait pattern Excessively slow;Shuffling;Decreased foot clearance;Forward Flexion   Gait Assistance 3  Moderate assist   Additional items Assist x 1  (additional person close for safety)   Assistive Device Other (Comment)   Distance 4'   Balance   Static Sitting Fair +   Dynamic Sitting Fair -   Static Standing Zero   Ambulatory Zero   Endurance  Deficit   Endurance Deficit Yes   Endurance Deficit Description limited by fatigue, weakness, cognitive, balance, and coordination   Activity Tolerance   Activity Tolerance Other (Comment)  (cognitive)   Medical Staff Made Aware OT   Nurse Made Aware yes, nsg gave clearance to work with pt   Exercises   Balance training  sitting unsupported EOB to engage in OT cognitive activity   Assessment   Prognosis Fair   Problem List Decreased strength;Decreased range of motion;Decreased endurance;Impaired balance;Decreased mobility;Decreased coordination;Decreased cognition;Impaired judgement;Decreased safety awareness;Pain   Assessment Noted improved ability to follow single step instruction this session. Continues to require increased time to motor plan. Improved sitting balance with decreased impulsivity. Continues to require increased A for transfers and gait with deficits in strength and balance. Ambulated with slow moderately unsteady gait. Remains high risk of falls with absent knowledge of current limitations. Pt will benefit from continued inpt skilled PT and rehab to maximize functional mobility& safety.   Barriers to Discharge Inaccessible home environment;Decreased caregiver support   Goals   Patient Goals None verbalized   STG Expiration Date 04/27/25   PT Treatment Day 4   Plan   Treatment/Interventions OT;Spoke to case management;Spoke to nursing;Gait training;Bed mobility;Patient/family training;Endurance training;LE strengthening/ROM;Functional transfer training   Progress Progressing toward goals   PT Frequency 3-5x/wk   Discharge Recommendation   Rehab Resource Intensity Level, PT I (Maximum Resource Intensity)   AM-PAC Basic Mobility Inpatient   Turning in Flat Bed Without Bedrails 3   Lying on Back to Sitting on Edge of Flat Bed Without Bedrails 3   Moving Bed to Chair 3   Standing Up From Chair Using Arms 2   Walk in Room 2   Climb 3-5 Stairs With Railing 2   Basic Mobility Inpatient Raw Score 15   Basic  Mobility Standardized Score 36.97   Turning Head Towards Sound 2   Follow Simple Instructions 2   Low Function Basic Mobility Raw Score  19   Low Function Basic Mobility Standardized Score  31.06   [facility] Highest Level Of Mobility   -[facility] Goal 4: Move to chair/commode   -HL Achieved 4: Move to chair/commode     Shikha Whitehead PT, DPT

## 2025-04-21 NOTE — PROGRESS NOTES
Progress Note - Critical Care/ICU   Name: Alexis Dennison 64 y.o. male I MRN: [id number]  Unit/Bed#: ICU 03 I Date of Admission: 3/29/2025   Date of Service: 4/21/2025 I Hospital Day: 23      Critical Care Interval Transfer Note:    Brief Hospital Summary:    64 y.o. M w/ PMHx HTN, HLD, DM2, GERD, and sleep apnea who presented to the ER with worsening confusion in the setting of a recently discovered brain mass. He was initially seen in [facility] ED 3/24/25 where CT head showed brain mass for which MRI was advised. He followed up with his PCP and was referred for MRI, which revealed potential malignancy. He was scheduled to see neurosurgery outpatient, but had progression of his confusion and double vision, and was subsequently brought to the ER. CTA chest/abdomen/pelvis was performed and revealed 4mm R lower lobe pulmonary nodule, 12mm hypodense liver lesion, and heterogenous nodular enhance of the left thyroid lobe. Patient was seen by neurosurgery and underwent LP on 3/31, which was suspicious for CNS lymphoma, however, lymphoma/leukemia panel was non-diagnostic. Due to location of the lesion, neurosurgery deemed that biopsy would be difficult and could result in significant morbidity. Therefore, it was recommended to undergo repeat high-volume LP, which was performed on 4/7 and again was non-diagnostic. MRI was performed on 4/11 and showed interval enlargement of the dominant L anterior basal ganglionic mass with greater surrounding vasogenic edema and mass effect. Patient was scheduled for biopsy with neurosurgery 4/14. In the interim, patient became more lethargic on 4/13 prompting neurocritical care evaluation. Repeat CT head was performed with concern for obstructive hydrocephalus and decision was made to place bedside EVD. 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 Initiated on rituximab 4/17 and high dose methotrexate with intrathecal cytarabine 4/18.  Pt on NaHCO3 drip to maintain urine alkaline and continue U/A Q6.  Hem-onc following. S/p EVD which pulled by pt himself yesterday; repeat CTH improved hence, Neuro Sx signed off as no need of shunt at this time. Pt has LETY, nephro recommending IVF. Pt started on Lantus 10U. On exam, pt is calm, follows command but sometimes pt is agitated - consider 1:1 observation  Otherwise pt stable for transfer to [facility]    Barriers to discharge:   Frequent neuro checks; Repeat CTH for any change in GCS > 2 points in 1 hour   LETY   PT/OT eval     Consults: IP CONSULT TO NEUROSURGERY  IP CONSULT TO ONCOLOGY  IP CONSULT TO PODIATRY  IP CONSULT TO NEUROCRITICAL CARE  IP CONSULT TO PHYSICAL MEDICINE REHAB  IP CONSULT TO PALLIATIVE CARE  IP CONSULT TO VENOUS ACCESS TEAM  IP CONSULT TO VENOUS ACCESS TEAM  IP CONSULT TO NEPHROLOGY    Recommended to review admission imaging for incidental findings and document in discharge navigator:  Incidental finding of 6 mm pituitary lesion was noted. Daughter was infomred regarding it and follow up outpatient with nonemergent MRI.     Discharge Plan: per primary team     Central access plan: Medications requiring central line       Patient seen and evaluated by Critical Care today and deemed to be appropriate for transfer to [facility]. Spoke to Dr. Lawrence MARTINS from [facility] to accept transfer. Critical care can be contacted via SecureChat with any questions or concerns. Please use the Critical Care AP Role in Secure Chat for any provider inquires until the patient is transferred out of the ICU or until tomorrow at 0600.

## 2025-04-21 NOTE — CASE MANAGEMENT
Case Management Discharge Planning Note    Patient name Alexis Dennison  Location ICU 03/ICU 03 MRN [id number]  : 1960 Date 2025       Current Admission Date: 3/29/2025  Current Admission Diagnosis:Brain tumor (HCC)   Patient Active Problem List    Diagnosis Date Noted Date Diagnosed    LETY (acute kidney injury) (HCC) 2025     Goals of care, counseling/discussion 2025     Palliative care encounter 2025     Primary CNS lymphoma 2025     Encephalopathy 2025     Constipation 2025     Ambulatory dysfunction 2025     Thyroid nodule 2025     Pulmonary nodule 2025     Liver lesion 2025     Brain tumor (HCC) 2025     Type 2 diabetes mellitus with hyperglycemia, without long-term current use of insulin (HCC) 2022     HTN, goal below 130/80 2022     Mixed hyperlipidemia 2022     Vitamin D deficiency 2022     NAFLD (nonalcoholic fatty liver disease) 2022       LOS (days): 23  Geometric Mean LOS (GMLOS) (days): 11.1  Days to GMLOS:-11.6     OBJECTIVE:  Risk of Unplanned Readmission Score: 33.72         Current admission status: Inpatient   Preferred Pharmacy:   [facility] #[id number] - [place], [place] - [address] [address]  [address] [address]  Phone: [phone] Fax: [phone]    [facility] - [place], [place] - [address]  [address]  [facility] [address]  Phone: [phone] Fax: [phone]    Primary Care Provider: PATI Mckeon    Primary Insurance: BLUE CROSS  Secondary Insurance: CAPITAL    DISCHARGE DETAILS:                                                                                                 Additional Comments: HOD #23 POD#7 brain biopsy--large B-cell lymphoma, awake, responds. EVD was clamped and pt pulled out over weekend. Continues with IV chemotherapy. Has been referred to  [facility] and [facility]. GCS  14, clear speech, delayed response, restless at times. On Bicarb gtt at 150cc/hr. Now on room air. Pt.'s wife speaks Chinese and works nights. Daughter is spokesperson and lives in [place]. Spoke with sister at bedside today. CM following.

## 2025-04-21 NOTE — CONSULTS
NEPHROLOGY HOSPITAL CONSULTATION   Alexis Dennison 64 y.o. male MRN: [id number]  Unit/Bed#: ICU 03 Encounter: [id number]    Assessment & Plan  LETY (acute kidney injury) (HCC)  - may be secondary to relative hypotension in the setting of methotrexate (dosed 4/18)  - avoid nephrotoxins  - avoid hypotension  - UA: trace blood, 1+ protein, PH 7.5  - check PVR but on 4/16 it was 190ml which is acceptable  HTN, goal below 130/80  - BP soft  - avoid hypotension  - agree with IVF  Brain tumor (HCC)  - primary CNS lymphoma as below  - neurosurgery signed off  Primary CNS lymphoma  - oncology on board  - bicarbonate drip to keep urine pH >7 due to high doses of methotrexate  - q6hr UA per oncology  Type 2 diabetes mellitus with hyperglycemia, without long-term current use of insulin (HCC)  Lab Results   Component Value Date    HGBA1C 6.6 (H) 02/22/2025   - per primary team  Ambulatory dysfunction  - per primary team  Encephalopathy  - per primary team    I have reviewed the nephrology recommendations including bicarbonate drip, with hematology, and we are in agreement with renal plan including the information outlined above.    HISTORY OF PRESENT ILLNESS:  Requesting Physician: Cami Rivera MD  Reason for Consult: LETY Dennison is a 64 y.o. male who was admitted to [facility] after presenting with confusion after new brain mass on 3/29/25. A renal consultation is requested today for assistance in the management of acute kidney injury.  He is sitting in his chair.  He denies acute complaints.  He states he did get dizzy yesterday.  He states he was eating well prior to being NPO.      PAST MEDICAL HISTORY:  Past Medical History:   Diagnosis Date    Colon polyp     Diabetes mellitus (HCC)     GERD (gastroesophageal reflux disease)     Hyperlipidemia     Hypertension     Liver disease     Sleep apnea        PAST SURGICAL HISTORY:  Past Surgical History:   Procedure Laterality Date    COLONOSCOPY      CYST  REMOVAL      back    FL LUMBAR PUNCTURE DIAGNOSTIC  3/31/2025    FL LUMBAR PUNCTURE DIAGNOSTIC  4/7/2025    VA EXC B9 LESION MRGN XCP SK TG T/A/L 0.6-1.0 CM N/A 6/7/2023    Procedure: EXCISION  BIOPSY LESION/MASS ABDOMINAL/CHEST WALL;  Surgeon: Trevor Villavicencio MD;  Location:  MAIN OR;  Service: General    THIRD VENTRICULOSTOMY Left 4/14/2025    Procedure: Left frontal endoscopic biopsy of ventricular mass and placement of EVD;  Surgeon: Paul Causey MD;  Location: [facility];  Service: Neurosurgery       ALLERGIES:  No Known Allergies    SOCIAL HISTORY:  Social History     Substance and Sexual Activity   Alcohol Use Yes    Alcohol/week: 1.0 standard drink of alcohol    Types: 1 Cans of beer per week    Comment: socially     Social History     Substance and Sexual Activity   Drug Use Never     Social History     Tobacco Use   Smoking Status Never   Smokeless Tobacco Never       FAMILY HISTORY:  Family History   Problem Relation Age of Onset    Cancer Mother     Cancer Father     Diabetes Paternal Grandfather     Diabetes unspecified Paternal Grandfather         Type 2    Colon polyps Neg Hx     Colon cancer Neg Hx        MEDICATIONS:    Current Facility-Administered Medications:     acetaminophen (TYLENOL) tablet 650 mg, 650 mg, Oral, Q6H PRN, Moncho Thorne DO    allopurinol (ZYLOPRIM) tablet 300 mg, 300 mg, Oral, Daily, Bryson Garcia DO, 300 mg at 04/21/25 0951    alteplase (CATHFLO) injection 2 mg, 2 mg, Intracatheter, Q1MIN PRN, Eva Ortiz MD    aluminum-magnesium hydroxide-simethicone (MAALOX) oral suspension 30 mL, 30 mL, Oral, Q4H PRN, Gayathri Guo PA-C    atorvastatin (LIPITOR) tablet 20 mg, 20 mg, Oral, Daily, Moncho Thorne DO, 20 mg at 04/21/25 0952    bisacodyl (DULCOLAX) rectal suppository 10 mg, 10 mg, Rectal, Daily, Gayathri Guo PA-C, 10 mg at 04/21/25 0924    calcium carbonate (TUMS) chewable tablet 1,000 mg, 1,000 mg, Oral, Daily PRN, Gayathri Guo PA-C    chlorhexidine  (PERIDEX) 0.12 % oral rinse 15 mL, 15 mL, Mouth/Throat, Q12H CAIT, Gayathri Guo PA-C, 15 mL at 04/21/25 1007    [Held by provider] Cholecalciferol (VITAMIN D3) tablet 2,000 Units, 2,000 Units, Oral, Daily, Gayathri Guo PA-C, 2,000 Units at 04/14/25 0810    dexamethasone (DECADRON) injection 4 mg, 4 mg, Intravenous, Q6H CAIT, Gayathri Guo PA-C, 4 mg at 04/21/25 0517    HYDROmorphone HCl (DILAUDID) injection 0.2 mg, 0.2 mg, Intravenous, Q2H PRN, Gayathri Guo PA-C    insulin lispro (HumALOG/ADMELOG) 100 units/mL subcutaneous injection 5-25 Units, 5-25 Units, Subcutaneous, TID AC, 10 Units at 04/20/25 1602 **AND** Fingerstick Glucose (POCT), , , TID AC, Kate Cohn PA-C    insulin lispro (HumALOG/ADMELOG) 100 units/mL subcutaneous injection 5-25 Units, 5-25 Units, Subcutaneous, HS, Kate Cohn PA-C, 5 Units at 04/20/25 2253    levETIRAcetam (KEPPRA) tablet 500 mg, 500 mg, Oral, Q12H CAIT, Moncho Thorne, DO, 500 mg at 04/21/25 0952    OLANZapine (ZyPREXA ZYDIS) dispersible tablet 10 mg, 10 mg, Oral, HS, Gisella Karimi PA-C, 10 mg at 04/20/25 2252    ondansetron (ZOFRAN) injection 4 mg, 4 mg, Intravenous, Q6H PRN, Gayathri Guo PA-C, 4 mg at 04/14/25 0930    oxyCODONE (ROXICODONE) split tablet 2.5 mg, 2.5 mg, Oral, Q4H PRN **OR** oxyCODONE (ROXICODONE) IR tablet 5 mg, 5 mg, Oral, Q4H PRN, Gayathri Guo PA-C    pantoprazole (PROTONIX) EC tablet 40 mg, 40 mg, Oral, Early Morning, Moncho Thorne DO, 40 mg at 04/21/25 0517    polyethylene glycol (MIRALAX) packet 17 g, 17 g, Oral, Daily, Moncho Thorne DO, 17 g at 04/21/25 0952    senna (SENOKOT) tablet 17.2 mg, 2 tablet, Oral, HS, Moncho hTorne DO, 17.2 mg at 04/20/25 2251    sodium bicarbonate 100 mEq in dextrose 5 % 1,000 mL infusion, 150 mL/hr, Intravenous, Continuous, Bryson Garcia DO    sodium chloride 0.9 % infusion, 20 mL/hr, Intravenous, Once PRN, Eva Ortiz MD, Stopped at 04/17/25 2051    sodium chloride 0.9 % infusion,  20 mL/hr, Intravenous, Once PRN, Eva Ortiz MD    REVIEW OF SYSTEMS:  All the systems were reviewed and were negative except as documented on the HPI.    PHYSICAL EXAM:  Current Weight: Weight - Scale: 71.9 kg (158 lb 8.2 oz)  First Weight: Weight - Scale: 86.2 kg (190 lb)  Vitals:    04/21/25 0703 04/21/25 0805 04/21/25 0905 04/21/25 1010   BP: 137/71 125/71 117/75 118/69   BP Location:  Left arm Left arm Left arm   Pulse: (!) 52 56 60 56   Resp: (!) 27 20 20 15   Temp:   97.7 °F (36.5 °C)    TempSrc:   Oral    SpO2:  95% 96% 97%   Weight:       Height:           Intake/Output Summary (Last 24 hours) at 4/21/2025 1021  Last data filed at 4/21/2025 1000  Gross per 24 hour   Intake 1857.5 ml   Output 2395 ml   Net -537.5 ml     Physical Exam  General: NAD  Neuro: alert awake  Psych: mood and affect appropriate  Skin: no rash  Eyes: anicteric  ENMT: mm moist  Neck: no masses  Respiratory: ctab  Cardiovascular: rrr  Extremities: no edema  Gastrointestinal: soft nt nd     Lab Results:   Results from last 7 days   Lab Units 04/21/25  0439 04/21/25  0437 04/20/25  0534 04/19/25  1821 04/19/25  0719 04/19/25  0528 04/18/25  0512 04/17/25  0621 04/16/25  0511   WBC Thousand/uL 11.00*  --  7.23  --  10.29*  --    < > 6.88 11.53*   HEMOGLOBIN g/dL 15.2  --  13.6  --  13.5  --    < > 13.7 13.3   HEMATOCRIT % 41.7  --  37.3  --  36.0*  --    < > 39.1 36.7   PLATELETS Thousands/uL 142*  --  106*  --  87*  --    < > 149 145*   POTASSIUM mmol/L 3.5  --  3.5 3.9  --  4.9   < > 4.1 4.0   CHLORIDE mmol/L 98  --  95* 92*  --  95*   < > 104 107   CO2 mmol/L 32  --  34* 30  --  30   < > 26 23   BUN mg/dL 37*  --  20 22  --  19   < > 23 17   CREATININE mg/dL 1.76*  --  0.93 0.86  --  0.87   < > 0.76 0.71   CALCIUM mg/dL 9.2  --  8.8 8.3*  --  8.8   < > 9.1 8.8   MAGNESIUM mg/dL 2.6  --  2.2  --   --  2.2   < > 2.1 2.1   PHOSPHORUS mg/dL  --  6.2* 3.9  --   --  3.7   < > 3.3 3.0   ALK PHOS U/L  --   --   --   --   --   --   --  44 42    ALT U/L  --   --   --   --   --   --   --  33 37   AST U/L  --   --   --   --   --   --   --  11* 15    < > = values in this interval not displayed.

## 2025-04-21 NOTE — ASSESSMENT & PLAN NOTE
Admitted 3/29 with worsening confusion since prior ED presentation and outpatient imaging  Seen at [facility] ED on 3/24 with 3 week history of altered mental status, memory difficulty  CTH 3/24 demonstrated multiple hyperdense ependyma/subependymal nodules  MRI brain 3/26 performed outpatient demonstrating multiple scattered foci of subependymal nodular enhancement with mild hydrocephalus, scattered nodular areas of enhancement in left anterior inferior basal ganglia dn foci of nodular enhancement along anteromedial aspect of left cerebral peduncle and left quirino  CTH 3/29 (this admission)  Stable subependymal nodules and left formamen of De Oliveira region hyperdense lesion with dilation of left lateral ventrical and minimal left to right midline shift  S/p LP 3/31  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   MRI Brain (4/11) showed progression of disease from 3/30 to 4/11 with interval enlargement of the dominant left anterior basal ganglionic mass with greater surrounding vasogenic edema and mass effect and persistent leptomeningeal and intraventricular seeding with obstructive hydrocephalus--current differential lymphoma versus high-grade glioma less likely metastasis  4/13: continued deterioration in mental status, CT Head 4/13 demonstrated left sided obstructive hydrocephalus from lesion located adjacent to formamen of Monro, similar to CT on 4/3; blood products within occiptial horn of left lateral ventricle similar to MRI 4/11; left anterior basal ganglia mass with surrounding edema,  regional mass effect and effacement of left suprasellar cistern, similar to MRI 4/11.  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)  Ophthalmology ambulatory referral placed for discharge (no slit-lamp exam available inpatient)  Discussed with radiation oncology - no indication for radiation at this time  Patient has been persistently bradycardia, asymptomatic at this time, will continue to be monitored in ICU at this time as EVD remains in place  MRI C/T/L (4/20) with no evidence of malignancy    Plan:  High-dose methotrexate (8 g/m²) every 2 weeks (C1 received 4/18, C2 planned 5/1)  Methotrexate levels 5.8 (24 hours) > 0.6 (48 hours) > pending (72 hours)  Leucovorin rescue: Increase leucovorin from 25 to 50 mg every 6 hours given supratherapeutic methotrexate levels  Coordinated IVF with nephrology and critical care team.  Recommend maintaining urine pH >= 7.0 to avoid methotrexate crystallization in renal tubules  Consulted nephrology given LETY (SCr 0.93-1.76) possibly due to high-dose methotrexate  Rituximab (375 mg/m²) weekly  Daily lab monitoring for TLS - CBC, CMP, Phos, LDH, Uric acid  Final path is pending from biopsy (4/14)

## 2025-04-21 NOTE — ASSESSMENT & PLAN NOTE
Admitted 3/29 with worsening confusion in the setting of recent ED visit with CTA head and neck 3/24 demonstrating multiple nonspecific hyperdense ependymal/subependymal nodules, largest located adjacent to the foramen of Monro (2 x 1.1 x 1 cm ) and f/u outpatient MRI 3/26 demonstrating multiple scattered foci of subependymal nodular enhancement throughout ventricles (left worse than right) with mild hydrocephalus (left worse than right), scattered nodular areas of enhancement in left anterior inferior basal ganglia involving left anterior commissure, and smaller foci of nodular enhancement along anteromedial aspect of left cerebral peduncle and left quirino.with concern for primary CNS malignancy  Per sister, both patient's parents with history of lymphoma  CTH on admission 3/29 stable subependymal nodules and left foramen of Monro region hyperdense lesion, dilation of left lateral ventricle and minimal left to right midline shift  4/3 imaging concerning for worsening hydrocephalus  2 non diagnostic lumbar punctures performed  MRI brain 4/11 with interval disease progression  4/13 developed worsening mentation and CTH demonstrated predominantly left sided obstructive hydrocephalus from lesion located adjacent to the foramen of Monro, now s/p left front EVD  4/14 underwent left frontal endoscopic biopsy and replacement of EVD  Preliminary pathology large B-cell lymphoma    Medical oncology neurosurgery following

## 2025-04-21 NOTE — QUICK NOTE
Updated daughter, Elizabeth regarding pt's status and stable for transfer to floors.   Discussed regarding incidental finding of cystic pituitary lesion - consider    nonemergent MRI pituitary.

## 2025-04-21 NOTE — PROGRESS NOTES
Progress Note - Neurosurgery   Name: Alexis Dennison 64 y.o. male I MRN: [id number]  Unit/Bed#: ICU 03 I Date of Admission: 3/29/2025   Date of Service: 4/21/2025 I Hospital Day: 23     Assessment & Plan  Brain tumor (HCC)  L frontal endoscopic biopsy of ventricular mass and replacement of EVD by Dr. Causey on 4/14/25  Subependymal lesions with large left basal ganglia suspicious for lymphoma.  P/w confusion, short term memory loss.  Patient had multiple nondiagnostic LPs and hematology/oncology was requesting a biopsy to confirm.  4/13 - patient had worsening mental status change and CT head demonstrated progressive ventriculomegaly and likely trapped left lateral ventricle, ultimately a left frontal EVD was placed.  4/14 - patient self extubated.  Preliminary pathology, small round blue cell tumor; differential Dx includes lymphoma   EVD replaced during surgery on 4/14  Patient pulled out EVD over weekend, but ICP's were low for > 24 hours after clamping    Imaging:  CTH 4/21: significant interval decrease in overall tumor burden with decrease in size of ventricular and left basal ganglia lesions, significant reduction in ventriculomegaly and transependymal edema, no longer any midline shift or trapped  ventricle, third ventricle and Foramen of Monro now clearly visible on coronal sequences    Plan:   No clinical concern or evidence for hydrocephalus s/p EVD removal, significant improvement in ventriculomegaly on repeat CTH overnight, no clinical or radiographic indication for VPS placement, I will cancel for today (my teams updated, daughter Elizabeth called)  Final pathology: primary CNS non-Hodgkin's lymphoma, diffuse large B-cell lymphoma   Oncology recommendations for treatment:   Plan is for treatment with the following regimen:   Rituximab 375 mg/m² intravenously yesterday  Initial infusion of high-dose methotrexate 8 g/m² intravenously over 4 hours today  Initiate intrathecal cytarabine with cycle 1 of high-dose  methotrexate.  Continue intrathecal Daria-C every 2 weeks x 4  On decadron  MRI spinal axis negative  Keppra only for postoperative seizure ppx  SBP<160  DVT ppx: SCDs, SQH BID   PT/OT   Social work following for assistance with dispo once medically cleared      Neurosurgery will sign off at this time. No need for outpatient follow-up since path was already discussed (started treatment inpatient) and sutures absorbable.      Vitals:    04/21/25 0200 04/21/25 0300 04/21/25 0400 04/21/25 0503   BP: 135/75 141/75 136/71 128/67   Pulse: (!) 52 (!) 54 56 (!) 52   Resp: (!) 27 (!) 28 (!) 24 22   Temp:   97.8 °F (36.6 °C)    TempSrc:   Oral    SpO2: 97%  98%    Weight:       Height:           Lab Results   Component Value Date/Time    SODIUM 141 04/21/2025 04:39 AM    K 3.5 04/21/2025 04:39 AM    WBC 11.00 (H) 04/21/2025 04:39 AM    HGB 15.2 04/21/2025 04:39 AM    HCT 41.7 04/21/2025 04:39 AM     (L) 04/21/2025 04:39 AM         Intake/Output Summary (Last 24 hours) at 4/21/2025 0717  Last data filed at 4/21/2025 0400  Gross per 24 hour   Intake 1774.05 ml   Output 2345 ml   Net -570.95 ml         Current Facility-Administered Medications:     acetaminophen (TYLENOL) tablet 650 mg, 650 mg, Oral, Q6H PRN, Moncoh Thorne DO    allopurinol (ZYLOPRIM) tablet 300 mg, 300 mg, Oral, Daily, Bryson Garcia DO, 300 mg at 04/20/25 0959    alteplase (CATHFLO) injection 2 mg, 2 mg, Intracatheter, Q1MIN PRN, Eva Ortiz MD    aluminum-magnesium hydroxide-simethicone (MAALOX) oral suspension 30 mL, 30 mL, Oral, Q4H PRN, Gayathri Guo PA-C    atorvastatin (LIPITOR) tablet 20 mg, 20 mg, Oral, Daily, Moncho Thorne DO, 20 mg at 04/20/25 0842    bisacodyl (DULCOLAX) rectal suppository 10 mg, 10 mg, Rectal, Daily, Gayathri Guo PA-C, 10 mg at 04/20/25 0909    calcium carbonate (TUMS) chewable tablet 1,000 mg, 1,000 mg, Oral, Daily PRN, Gayathri Guo PA-C    chlorhexidine (PERIDEX) 0.12 % oral rinse 15 mL, 15 mL,  Mouth/Throat, Q12H CAIT, MARLO Rockwell-C, 15 mL at 04/20/25 2049    [Held by provider] Cholecalciferol (VITAMIN D3) tablet 2,000 Units, 2,000 Units, Oral, Daily, Gayathriflaca Guo PA-C, 2,000 Units at 04/14/25 0810    [Held by provider] cyanocobalamin (VITAMIN B-12) tablet 1,000 mcg, 1,000 mcg, Oral, Daily, MARLO Rockwell-C, 1,000 mcg at 04/13/25 0935    dexamethasone (DECADRON) injection 4 mg, 4 mg, Intravenous, Q6H CAIT, Gayathri Guo PA-C, 4 mg at 04/21/25 0517    HYDROmorphone HCl (DILAUDID) injection 0.2 mg, 0.2 mg, Intravenous, Q2H PRN, Gayathri Guo PA-C    insulin lispro (HumALOG/ADMELOG) 100 units/mL subcutaneous injection 5-25 Units, 5-25 Units, Subcutaneous, TID AC, 10 Units at 04/20/25 1602 **AND** Fingerstick Glucose (POCT), , , TID AC, Kate Cohn PA-C    insulin lispro (HumALOG/ADMELOG) 100 units/mL subcutaneous injection 5-25 Units, 5-25 Units, Subcutaneous, HS, Kate Cohn PA-C, 5 Units at 04/20/25 2253    levETIRAcetam (KEPPRA) tablet 500 mg, 500 mg, Oral, Q12H CAIT, Moncho Thorne DO, 500 mg at 04/20/25 2049    OLANZapine (ZyPREXA ZYDIS) dispersible tablet 10 mg, 10 mg, Oral, HS, Gisella Karimi PA-C, 10 mg at 04/20/25 2252    ondansetron (ZOFRAN) injection 4 mg, 4 mg, Intravenous, Q6H PRN, Gayathri Guo PA-C, 4 mg at 04/14/25 0930    oxyCODONE (ROXICODONE) split tablet 2.5 mg, 2.5 mg, Oral, Q4H PRN **OR** oxyCODONE (ROXICODONE) IR tablet 5 mg, 5 mg, Oral, Q4H PRN, Gayathri Guo PA-C    pantoprazole (PROTONIX) EC tablet 40 mg, 40 mg, Oral, Early Morning, Moncho Thorne DO, 40 mg at 04/21/25 0517    polyethylene glycol (MIRALAX) packet 17 g, 17 g, Oral, Daily, Moncho Thorne DO, 17 g at 04/20/25 0842    senna (SENOKOT) tablet 17.2 mg, 2 tablet, Oral, HS, Moncho Thorne DO, 17.2 mg at 04/20/25 2251    sodium chloride 0.9 % infusion, 20 mL/hr, Intravenous, Once PRN, Eva Ortiz MD, Stopped at 04/17/25 2051    sodium chloride 0.9 % infusion, 20 mL/hr,  Intravenous, Once PRN, Eva Ortiz MD     Neurologic exam  Alert, oriented X 3, fully conversant, interative  PERRL, EOMI, denies blurred/double vision  Face symmetric  5/5 in all extremities, following commands briskly  Sensation intact throughout  No nuchal rigidity      Paul Causey M.D.  Neurosurgeon

## 2025-04-21 NOTE — ASSESSMENT & PLAN NOTE
- oncology on board  - bicarbonate drip to keep urine pH >7 due to high doses of methotrexate  - q6hr UA per oncology

## 2025-04-22 LAB
ANION GAP SERPL CALCULATED.3IONS-SCNC: 11 MMOL/L (ref 4–13)
BACTERIA UR QL AUTO: ABNORMAL /HPF
BASOPHILS # BLD MANUAL: 0 THOUSAND/UL (ref 0–0.1)
BASOPHILS NFR MAR MANUAL: 0 % (ref 0–1)
BILIRUB UR QL STRIP: NEGATIVE
BUN SERPL-MCNC: 55 MG/DL (ref 5–25)
CALCIUM SERPL-MCNC: 9.1 MG/DL (ref 8.4–10.2)
CHLORIDE SERPL-SCNC: 96 MMOL/L (ref 96–108)
CLARITY UR: ABNORMAL
CLARITY UR: CLEAR
CO2 SERPL-SCNC: 35 MMOL/L (ref 21–32)
COLOR UR: COLORLESS
CREAT SERPL-MCNC: 2.77 MG/DL (ref 0.6–1.3)
CRYSTALS URNS QL MICRO: ABNORMAL /HPF
EOSINOPHIL # BLD MANUAL: 0 THOUSAND/UL (ref 0–0.4)
EOSINOPHIL NFR BLD MANUAL: 0 % (ref 0–6)
ERYTHROCYTE [DISTWIDTH] IN BLOOD BY AUTOMATED COUNT: 11.8 % (ref 11.6–15.1)
GFR SERPL CREATININE-BSD FRML MDRD: 22 ML/MIN/1.73SQ M
GLUCOSE SERPL-MCNC: 119 MG/DL (ref 65–140)
GLUCOSE SERPL-MCNC: 135 MG/DL (ref 65–140)
GLUCOSE SERPL-MCNC: 153 MG/DL (ref 65–140)
GLUCOSE SERPL-MCNC: 176 MG/DL (ref 65–140)
GLUCOSE SERPL-MCNC: 287 MG/DL (ref 65–140)
GLUCOSE UR STRIP-MCNC: ABNORMAL MG/DL
GLUCOSE UR STRIP-MCNC: NEGATIVE MG/DL
GLUCOSE UR STRIP-MCNC: NEGATIVE MG/DL
HCT VFR BLD AUTO: 41.1 % (ref 36.5–49.3)
HGB BLD-MCNC: 14.7 G/DL (ref 12–17)
HGB UR QL STRIP.AUTO: ABNORMAL
KETONES UR STRIP-MCNC: NEGATIVE MG/DL
LDH SERPL-CCNC: 287 U/L (ref 140–271)
LEUKOCYTE ESTERASE UR QL STRIP: NEGATIVE
LYMPHOCYTES # BLD AUTO: 0.93 THOUSAND/UL (ref 0.6–4.47)
LYMPHOCYTES # BLD AUTO: 9 % (ref 14–44)
MAGNESIUM SERPL-MCNC: 2.7 MG/DL (ref 1.9–2.7)
MCH RBC QN AUTO: 31.5 PG (ref 26.8–34.3)
MCHC RBC AUTO-ENTMCNC: 35.8 G/DL (ref 31.4–37.4)
MCV RBC AUTO: 88 FL (ref 82–98)
MONOCYTES # BLD AUTO: 0 THOUSAND/UL (ref 0–1.22)
MONOCYTES NFR BLD: 0 % (ref 4–12)
MTX SERPL-SCNC: 0.45 UMOL/L
NEUTROPHILS # BLD MANUAL: 9.36 THOUSAND/UL (ref 1.85–7.62)
NEUTS SEG NFR BLD AUTO: 91 % (ref 43–75)
NITRITE UR QL STRIP: NEGATIVE
NON-SQ EPI CELLS URNS QL MICRO: ABNORMAL /HPF
PH UR STRIP.AUTO: 7.5 [PH]
PH UR STRIP.AUTO: 8 [PH]
PH UR STRIP.AUTO: 8 [PH]
PHOSPHATE SERPL-MCNC: 5.5 MG/DL (ref 2.3–4.1)
PLATELET # BLD AUTO: 154 THOUSANDS/UL (ref 149–390)
PLATELET BLD QL SMEAR: ABNORMAL
PMV BLD AUTO: 10.9 FL (ref 8.9–12.7)
POTASSIUM SERPL-SCNC: 4.2 MMOL/L (ref 3.5–5.3)
PROT UR STRIP-MCNC: ABNORMAL MG/DL
PROT UR STRIP-MCNC: NEGATIVE MG/DL
RBC # BLD AUTO: 4.66 MILLION/UL (ref 3.88–5.62)
RBC #/AREA URNS AUTO: ABNORMAL /HPF
RBC MORPH BLD: NORMAL
SODIUM SERPL-SCNC: 142 MMOL/L (ref 135–147)
SP GR UR STRIP.AUTO: 1.01 (ref 1–1.03)
URATE SERPL-MCNC: 4.4 MG/DL (ref 3.5–8.5)
UROBILINOGEN UR STRIP-ACNC: <2 MG/DL
WBC # BLD AUTO: 10.29 THOUSAND/UL (ref 4.31–10.16)
WBC #/AREA URNS AUTO: ABNORMAL /HPF

## 2025-04-22 PROCEDURE — 84100 ASSAY OF PHOSPHORUS: CPT

## 2025-04-22 PROCEDURE — 82948 REAGENT STRIP/BLOOD GLUCOSE: CPT

## 2025-04-22 PROCEDURE — 92526 ORAL FUNCTION THERAPY: CPT

## 2025-04-22 PROCEDURE — 85007 BL SMEAR W/DIFF WBC COUNT: CPT

## 2025-04-22 PROCEDURE — 99232 SBSQ HOSP IP/OBS MODERATE 35: CPT | Performed by: INTERNAL MEDICINE

## 2025-04-22 PROCEDURE — 80048 BASIC METABOLIC PNL TOTAL CA: CPT

## 2025-04-22 PROCEDURE — 80204 DRUG ASSAY METHOTREXATE: CPT

## 2025-04-22 PROCEDURE — 84550 ASSAY OF BLOOD/URIC ACID: CPT

## 2025-04-22 PROCEDURE — 99233 SBSQ HOSP IP/OBS HIGH 50: CPT | Performed by: PHYSICIAN ASSISTANT

## 2025-04-22 PROCEDURE — 97530 THERAPEUTIC ACTIVITIES: CPT

## 2025-04-22 PROCEDURE — 99232 SBSQ HOSP IP/OBS MODERATE 35: CPT

## 2025-04-22 PROCEDURE — 83615 LACTATE (LD) (LDH) ENZYME: CPT

## 2025-04-22 PROCEDURE — 83735 ASSAY OF MAGNESIUM: CPT

## 2025-04-22 PROCEDURE — 99233 SBSQ HOSP IP/OBS HIGH 50: CPT | Performed by: INTERNAL MEDICINE

## 2025-04-22 PROCEDURE — 81001 URINALYSIS AUTO W/SCOPE: CPT

## 2025-04-22 PROCEDURE — 81001 URINALYSIS AUTO W/SCOPE: CPT | Performed by: INTERNAL MEDICINE

## 2025-04-22 PROCEDURE — 85027 COMPLETE CBC AUTOMATED: CPT

## 2025-04-22 PROCEDURE — 99418 PROLNG IP/OBS E/M EA 15 MIN: CPT | Performed by: PHYSICIAN ASSISTANT

## 2025-04-22 PROCEDURE — 97116 GAIT TRAINING THERAPY: CPT

## 2025-04-22 RX ORDER — LEUCOVORIN CALCIUM 25 MG/1
50 TABLET ORAL EVERY 6 HOURS
Status: DISCONTINUED | OUTPATIENT
Start: 2025-04-22 | End: 2025-04-25

## 2025-04-22 RX ORDER — OLANZAPINE 5 MG/1
5 TABLET, FILM COATED ORAL
Status: DISCONTINUED | OUTPATIENT
Start: 2025-04-22 | End: 2025-04-24

## 2025-04-22 RX ORDER — OLANZAPINE 10 MG/2ML
5 INJECTION, POWDER, FOR SOLUTION INTRAMUSCULAR EVERY 8 HOURS PRN
Status: DISCONTINUED | OUTPATIENT
Start: 2025-04-22 | End: 2025-04-29

## 2025-04-22 RX ORDER — LEVETIRACETAM 500 MG/1
500 TABLET ORAL EVERY 12 HOURS SCHEDULED
Status: DISCONTINUED | OUTPATIENT
Start: 2025-04-22 | End: 2025-04-29

## 2025-04-22 RX ORDER — DEXAMETHASONE SODIUM PHOSPHATE 4 MG/ML
4 INJECTION, SOLUTION INTRA-ARTICULAR; INTRALESIONAL; INTRAMUSCULAR; INTRAVENOUS; SOFT TISSUE EVERY 6 HOURS SCHEDULED
Status: DISCONTINUED | OUTPATIENT
Start: 2025-04-22 | End: 2025-04-25

## 2025-04-22 RX ORDER — OLANZAPINE 5 MG/1
5 TABLET, FILM COATED ORAL DAILY PRN
Status: DISCONTINUED | OUTPATIENT
Start: 2025-04-22 | End: 2025-04-22

## 2025-04-22 RX ADMIN — INSULIN GLARGINE 10 UNITS: 100 INJECTION, SOLUTION SUBCUTANEOUS at 23:38

## 2025-04-22 RX ADMIN — SENNOSIDES 17.2 MG: 8.6 TABLET, FILM COATED ORAL at 23:33

## 2025-04-22 RX ADMIN — ALLOPURINOL 300 MG: 300 TABLET ORAL at 10:59

## 2025-04-22 RX ADMIN — INSULIN LISPRO 5 UNITS: 100 INJECTION, SOLUTION INTRAVENOUS; SUBCUTANEOUS at 23:34

## 2025-04-22 RX ADMIN — POLYETHYLENE GLYCOL 3350 17 G: 17 POWDER, FOR SOLUTION ORAL at 10:59

## 2025-04-22 RX ADMIN — DEXAMETHASONE SODIUM PHOSPHATE 4 MG: 4 INJECTION INTRA-ARTICULAR; INTRALESIONAL; INTRAMUSCULAR; INTRAVENOUS; SOFT TISSUE at 17:27

## 2025-04-22 RX ADMIN — OLANZAPINE 5 MG: 10 INJECTION, POWDER, FOR SOLUTION INTRAMUSCULAR at 15:19

## 2025-04-22 RX ADMIN — ATORVASTATIN CALCIUM 20 MG: 20 TABLET, FILM COATED ORAL at 10:59

## 2025-04-22 RX ADMIN — SODIUM BICARBONATE 150 ML/HR: 84 INJECTION, SOLUTION INTRAVENOUS at 05:26

## 2025-04-22 RX ADMIN — LEVETIRACETAM 500 MG: 500 TABLET, FILM COATED ORAL at 10:59

## 2025-04-22 RX ADMIN — HEPARIN SODIUM 5000 UNITS: 5000 INJECTION INTRAVENOUS; SUBCUTANEOUS at 23:33

## 2025-04-22 RX ADMIN — DEXAMETHASONE SODIUM PHOSPHATE 4 MG: 4 INJECTION INTRA-ARTICULAR; INTRALESIONAL; INTRAMUSCULAR; INTRAVENOUS; SOFT TISSUE at 10:59

## 2025-04-22 RX ADMIN — LEUCOVORIN CALCIUM 50 MG: 25 TABLET ORAL at 13:44

## 2025-04-22 RX ADMIN — SODIUM BICARBONATE 125 ML/HR: 84 INJECTION, SOLUTION INTRAVENOUS at 23:33

## 2025-04-22 RX ADMIN — LEVETIRACETAM 500 MG: 500 TABLET, FILM COATED ORAL at 19:36

## 2025-04-22 RX ADMIN — DEXAMETHASONE SODIUM PHOSPHATE 4 MG: 4 INJECTION INTRA-ARTICULAR; INTRALESIONAL; INTRAMUSCULAR; INTRAVENOUS; SOFT TISSUE at 23:33

## 2025-04-22 RX ADMIN — CHLORHEXIDINE GLUCONATE 0.12% ORAL RINSE 15 ML: 1.2 LIQUID ORAL at 11:00

## 2025-04-22 RX ADMIN — OLANZAPINE 5 MG: 5 TABLET, FILM COATED ORAL at 23:33

## 2025-04-22 RX ADMIN — INSULIN LISPRO 15 UNITS: 100 INJECTION, SOLUTION INTRAVENOUS; SUBCUTANEOUS at 17:26

## 2025-04-22 RX ADMIN — HEPARIN SODIUM 5000 UNITS: 5000 INJECTION INTRAVENOUS; SUBCUTANEOUS at 13:44

## 2025-04-22 RX ADMIN — SODIUM BICARBONATE 125 ML/HR: 84 INJECTION, SOLUTION INTRAVENOUS at 13:53

## 2025-04-22 RX ADMIN — Medication 6 MG: at 19:36

## 2025-04-22 RX ADMIN — CHLORHEXIDINE GLUCONATE 0.12% ORAL RINSE 15 ML: 1.2 LIQUID ORAL at 19:36

## 2025-04-22 RX ADMIN — BISACODYL 10 MG: 10 SUPPOSITORY RECTAL at 10:59

## 2025-04-22 RX ADMIN — LEUCOVORIN CALCIUM 50 MG: 25 TABLET ORAL at 19:36

## 2025-04-22 NOTE — ASSESSMENT & PLAN NOTE
Lab Results   Component Value Date    CREATININE 2.77 (H) 04/22/2025    CREATININE 1.76 (H) 04/21/2025    CREATININE 0.93 04/20/2025       Lab Results   Component Value Date    EGFR 22 04/22/2025    EGFR 39 04/21/2025    EGFR 85 04/20/2025   Creatinine continues to uptrend.  Clear in the setting of hypotension episode and methotrexate toxicity  Continue IV fluids per nephrology

## 2025-04-22 NOTE — SOCIAL WORK
"Palliative Inpatient Assessment Note    LSW appreciates the opportunity to provide patient/family with inpatient emotional support and guidance while they continue to receive medical attention from a Palliative provider.    LSW completed an assessment of need which was completed with the pt wife Naldo    Relationship status:     Duration of relationship: 16yrs    Name of significant other:Guy \"Naldo\" Phillip    Children and Ages: 4 children from a previous union 1 son is autistic. Wife has deferred to Elizabeth (dtr) as POA, he has two other adult children    Pets:their pet passed away last year    Other important family information:    Living situation (place and with whom):The pt resides with his wife in a 2 story home.  There are 11 steps to navigate      Patient's primary caregiver:  Self and wife    Any limitations:The pt wife shared that he was able to navigate the steps and care for himself prior to the hospitalization.  The pt wife states they had an extended trip to [place] and upon their return flight she noticed the pt was having some cognitive challenges    Environmental concerns or barriers:none     history:none    Employment history/ Source of income: The pt went to work after their return from [place].  He is a .  She states he was having significant cognitive deficits after returning home from work    Disability:none    Concerns regarding literacy: none    Spirituality/ Judaism:  no Spiritual of Jain    Cultural information:     Mental Health and/or Drug and Alcohol history:the pt has not Hx of anxiety or depression and does not smoke or drink alcohol    Advanced Directives:  There are no Advanced directives which the wife or his daughter Elizabeth are aware.  The pt wife is able to sign any documentation and has deferred medical conversations to his daughter Elizabeth.  The pt wife states she has a close relationship to the family.  She has requested regular health updates " because she is aware she will need to assist the pt once he returns home.  She will take a leave from her employment to be home and care for him.  The pt wife works nights as a .      Patient's strengths, social supports, and resources:The pt has two sisters who reside a distance away and his children are additional support.  His wife is the primary caregiver    Patient/family current level of coping:  The pt has some confusion.  His wife and daughter are maintaining regular contact with the treatment team.  The pt wife would like to speak with oncology because she is interested in more information regarding the pt overall prognosis.      Level of understanding:The pt wife is aware the providers are going to reaccess the pt and determine if the treatment is helpful.  She is aware the outcome could be that the pt is stable, the tumors have increased or decreased.  She is aware at that time changes or adjustments may need to be made to the pt treatment plan.    Patient/family concerns and areas of need:The pt wife would benefit from ongoing support.  She shared that her  has been a very good provider and she is learning how to pay the bills and address the finances in the household.  She states his daughter has been very helpful assisting her.    The pt wife also shared that she had cancer in 2008.      This meeting with the pt wife required the use of a   The pt wife was interested in an explanation of the pt diagnosis, and treatment.  She was aware the pt would not be receiving radiation and no chemo for his brain.  The pt wife believed the pt treatment would be monthly.  However he has been scheduled for chemo treatment May 2nd.  The provider will provide her with clarity about the chemo treatment schedule .    I have spent 45 minutes with Family today in which greater than 50% of this time was spent in counseling/coordination of care regarding Prognosis.    *All questions may not be  answered due to constraints.  Follow-up discussions may need to occur

## 2025-04-22 NOTE — ASSESSMENT & PLAN NOTE
Worsening, patient is more lethargic today  Due to brain mass as above  Patient with acute onset confusion, forgetting names/places, oriented to self only most of the times  Delirium precautions  Patient has improving encephalopathy according to the daughter.

## 2025-04-22 NOTE — ASSESSMENT & PLAN NOTE
In the setting of the above  On exam, he is confused but interactive. Oriented to name only, does not recall , disoriented to all else.  EVD dislodged due to  agitation over the weekend  Remains on UE mits/restraints, wean as tolerated. Olanzapine as above

## 2025-04-22 NOTE — ASSESSMENT & PLAN NOTE
Nephrology following. Likely secondary to methotrexate and hypotensive episode. Continue to monitor. Leucovorin for supra therapeutic methotrexate levels

## 2025-04-22 NOTE — ASSESSMENT & PLAN NOTE
- oncology on board  - keep urine pH >7 due to high doses of methotrexate, current pH 8.0.  Will adjust bicarb drip  - q6hr UA per oncology  - High-dose methotrexate C2 planned for 5/1

## 2025-04-22 NOTE — ASSESSMENT & PLAN NOTE
Appreciate oncology input  methotrexate and rituximab started this admission  Next methotrexate due 5/1, rituximab weekly per oncology note  Spouse requesting prognostic information from oncology team, relayed

## 2025-04-22 NOTE — PROGRESS NOTES
"Progress Note - Palliative Care   Name: Alexis Dennison 64 y.o. male I MRN: [id number]  Unit/Bed#: [facility] 731-01 I Date of Admission: 3/29/2025   Date of Service: 4/22/2025 I Hospital Day: 24     Assessment & Plan  Palliative care encounter  Palliative diagnosis: newly diagnosed large B cell lymphoma    Symptom management:  Discussed with SLIM  Plan for 5mg olanzapine HS and 5mg daily PRN agitation  Goal to wean patient from restraints to enable therapy/discharge planning  Continues on decadron 4mg Q6H, wean as appropriate  oxyIR 2.5-5mg PO Q4H PRN moderate-severe pain  Dilaudid 0.2mg IV Q2H PRN BT pain  Acetaminophen 650mg Q6H PRN mild pain  Bowel regimen in place  Notified primary team that patient uses CPAP HS    Goals:  Level 1 code status  Disease focused care without limits placed  Patient initiated high-dose methotrexate + rituximab inpatient  Per oncology note methotrexate biweekly (next dose 5/1) and rituximab weekly    Decisional apparatus:  Patient does not have capacity on exam today.  If capacity is lost, patient's substitute decision maker would default to spouse and adult children by [place] Act 169.  ER contacts:  Elizabeth Chester  Daughter  [phone]  Guy Naldo \"Naldo\" Phillip  Spouse  [phone]    Patient also has a son, Drew, and another daughter, Marina, who has down syndrome. Elizabeth is the main contact but family makes decisions as unit.    Advance Directive/Living Will/POLST: none on file    Social support:  Patient support system: spouse Naldo, 3 adult children, extended family.  Spouse Naldo lives locally and daughter Elizabeth is from [place] but Nalod prefers that she is primary contact due to language barrier (Chinese)  Met with Naldo along with palliative SW (Shikha and Brynn) and  (Kaylee #[id number]).  Provided medical update, supportive listening, answered questions to her satisfaction  Naldo asks for oncology feedback on prognosis, this was relayed to their team  Supportive listening provided  Normalized " experience of patient/family  Provided anticipatory guidance  Advocated for patient/family with interdisciplinary care team    Coordination of care:  Reviewed case with RN, primary team    Follow up  Palliative Care will continue to follow for support and goals of care discussions will be ongoing pending course.  Please reach out via [employer] secure chat if questions or concerns arise.    We appreciate the invitation to be involved in this patient's care.   Brain tumor (HCC)  Admitted 3/29 with worsening confusion in the setting of recent ED visit with CTA head and neck 3/24 demonstrating multiple nonspecific hyperdense ependymal/subependymal nodules, largest located adjacent to the foramen of Monro (2 x 1.1 x 1 cm ) and f/u outpatient MRI 3/26 demonstrating multiple scattered foci of subependymal nodular enhancement throughout ventricles (left worse than right) with mild hydrocephalus (left worse than right), scattered nodular areas of enhancement in left anterior inferior basal ganglia involving left anterior commissure, and smaller foci of nodular enhancement along anteromedial aspect of left cerebral peduncle and left quirino.with concern for primary CNS malignancy  Per sister, both patient's parents with history of lymphoma  CTH on admission 3/29 stable subependymal nodules and left foramen of Monro region hyperdense lesion, dilation of left lateral ventricle and minimal left to right midline shift  4/3 imaging concerning for worsening hydrocephalus  2 non diagnostic lumbar punctures performed  MRI brain 4/11 with interval disease progression  4/13 developed worsening mentation and CTH demonstrated predominantly left sided obstructive hydrocephalus from lesion located adjacent to the foramen of Monro, now s/p left front EVD  4/14 underwent left frontal endoscopic biopsy and replacement of EVD  Preliminary pathology large B-cell lymphoma    Medical oncology neurosurgery following  Primary CNS lymphoma  Appreciate  oncology input  methotrexate and rituximab started this admission  Next methotrexate due , rituximab weekly per oncology note  Spouse requesting prognostic information from oncology team, relayed  Encephalopathy  In the setting of the above  On exam, he is confused but interactive. Oriented to name only, does not recall , disoriented to all else.  EVD dislodged due to  agitation over the weekend  Remains on UE mits/restraints, wean as tolerated. Olanzapine as above  Pulmonary nodule  CTA 3/29 demonstrates non specific 4 mm right lower lobe pulmonary nodule  Recommended 3 mo f/u  Liver lesion  CTA 3/29 demonstrates non specific 12 mm hypodense lesion within right hepatic lobe, MRI recommended  MRI 3/30 demonstrates no suspicious hepatic lesions, simple right hepatic lobe cyst noted    LETY (acute kidney injury) (HCC)  Nephrology following. Likely secondary to methotrexate and hypotensive episode. Continue to monitor. Leucovorin for supra therapeutic methotrexate levels  Interval history:       Patient was transferred out of ICU overnight. Remains oriented to self only, challenges with ROS secondary to confusion/word finding difficulty. UE restraints remain in place due to persistent impulsivity/attempts to get OOB and dislodge IV. Met with spouse, Naldo, as above.    MEDICATIONS / ALLERGIES:     all current active meds have been reviewed    No Known Allergies    OBJECTIVE:    Physical Exam  Physical Exam  HENT:      Head: Normocephalic and atraumatic.   Eyes:      Conjunctiva/sclera: Conjunctivae normal.   Cardiovascular:      Rate and Rhythm: Normal rate.   Pulmonary:      Effort: No respiratory distress.   Abdominal:      Tenderness: There is no guarding.   Musculoskeletal:         General: No swelling.   Skin:     General: Skin is warm and dry.   Neurological:      Mental Status: He is alert.      Comments: Confused, oriented to self only   Psychiatric:      Comments: UE mits and restraints on secondary to  impulsivity/attempt to dislodge IV         Lab Results:   Results from last 7 days   Lab Units 04/22/25  0515 04/21/25  0439 04/20/25  0534 04/19/25  0719 04/18/25  0512 04/17/25  0621 04/16/25  0511   WBC Thousand/uL 10.29* 11.00* 7.23 10.29* 10.59* 6.88 11.53*   HEMOGLOBIN g/dL 14.7 15.2 13.6 13.5 13.9 13.7 13.3   HEMATOCRIT % 41.1 41.7 37.3 36.0* 38.8 39.1 36.7   PLATELETS Thousands/uL 154 142* 106* 87* 140* 149 145*   SEGS PCT %  --   --   --  89*  --  81* 87*   MONO PCT % 0*  --   --  5 0* 5 4   EOS PCT % 0  --   --  0 0 0 0     Results from last 7 days   Lab Units 04/22/25 0515 04/21/25 0439 04/20/25 0534 04/18/25  0512 04/17/25  0621 04/16/25  0511   POTASSIUM mmol/L 4.2 3.5 3.5   < > 4.1 4.0   CHLORIDE mmol/L 96 98 95*   < > 104 107   CO2 mmol/L 35* 32 34*   < > 26 23   BUN mg/dL 55* 37* 20   < > 23 17   CREATININE mg/dL 2.77* 1.76* 0.93   < > 0.76 0.71   CALCIUM mg/dL 9.1 9.2 8.8   < > 9.1 8.8   ALK PHOS U/L  --   --   --   --  44 42   ALT U/L  --   --   --   --  33 37   AST U/L  --   --   --   --  11* 15    < > = values in this interval not displayed.       Imaging Studies: reviewed pertinent studies   EKG, Pathology, and Other Studies: reviewed pertinent studies    Counseling / Coordination of Care    Total floor / unit time spent today 75+ minutes. Greater than 50% of total time was spent with the patient and / or family counseling and / or coordination of care. A description of the counseling / coordination of care: symptom assessment and management, medication review, medication adjustment, psychosocial support, chart review, imaging review, lab review, goals of care, supportive listening, anticipatory guidance, and coordination with primary team, oncology, CM, RN, family    This note was not shared with the patient due to privacy exception: note includes other individuals

## 2025-04-22 NOTE — PROGRESS NOTES
24 Hour Events : No acute events.  Subjective : Appetite fair.  No nausea or vomiting.  Denies constipation.  No shortness of breath, chest pain.  No bleeding symptoms.    Objective :  Temp:  [97.4 °F (36.3 °C)-97.8 °F (36.6 °C)] 97.4 °F (36.3 °C)  HR:  [48-64] 53  BP: (119-146)/(63-69) 138/69  Resp:  [14-18] 14  SpO2:  [93 %-99 %] 93 %  O2 Device: None (Room air)    Physical Exam  Constitutional:       Appearance: He is well-developed.   HENT:      Head: Normocephalic and atraumatic.      Mouth/Throat:      Mouth: Mucous membranes are moist.   Eyes:      Pupils: Pupils are equal, round, and reactive to light.   Cardiovascular:      Rate and Rhythm: Normal rate and regular rhythm.      Heart sounds: No murmur heard.  Pulmonary:      Breath sounds: Normal breath sounds. No wheezing or rales.   Abdominal:      Palpations: Abdomen is soft.      Tenderness: There is no abdominal tenderness.   Musculoskeletal:         General: No tenderness. Normal range of motion.      Cervical back: Neck supple.   Lymphadenopathy:      Cervical: No cervical adenopathy.   Skin:     Findings: No erythema or rash.   Neurological:      Mental Status: He is alert and oriented to person, place, and time.      Cranial Nerves: No cranial nerve deficit.      Deep Tendon Reflexes: Reflexes are normal and symmetric.   Psychiatric:         Behavior: Behavior normal.         Lab Results: I have reviewed the following results:CBC/BMP:   .     04/22/25  0515   WBC 10.29*   HGB 14.7   HCT 41.1      SODIUM 142   K 4.2   CL 96   CO2 35*   BUN 55*   CREATININE 2.77*   GLUC 153*   MG 2.7   PHOS 5.5*    , Creatinine Clearance: Estimated Creatinine Clearance: 27 mL/min (A) (by C-G formula based on SCr of 2.77 mg/dL (H)).    Imaging Results Review: No pertinent imaging studies reviewed.  Other Study Results Review: No additional pertinent studies reviewed.    Primary CNS lymphoma.  Status post high-dose methotrexate.  72-hour level 0.45.  Continue IV  Nahco3, leucovorin. Monitor.   Creat elevated. Nephro following. Prob at least partly 2 to HDMTX.   Pt seems slightly more coherent.     Administrative Statements   I have spent a total time of 20 minutes in caring for this patient on the day of the visit/encounter including Diagnostic results, Impressions, Documenting in the medical record, Reviewing/placing orders in the medical record (including tests, medications, and/or procedures), and Obtaining or reviewing history  .

## 2025-04-22 NOTE — PROGRESS NOTES
"Progress Note - Hospitalist   Name: Alexis Dennison 64 y.o. male I MRN: [id number]  Unit/Bed#: [facility] 731-01 I Date of Admission: 3/29/2025   Date of Service: 4/22/2025 I Hospital Day: 24     Assessment & Plan  Brain tumor (HCC)  Patient with development of worsening confusion, recently seen at the [facility] ED 3/24/2025 with CT head at that time with finding of brain lesion for which MRI was advised to exclude cancerous etiology.     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  Lymphoma/leukemia panel nondiagnostic  CT head on 4/3 with interval increase in ventricular caliber concerning for worsening hydrocephalus  No indication for AED per neurosurgery and holding off steroids until Dx achieved  Continue neuroccks   Neurosurgery and oncology recommended repeat high-volume LP as previous was nondiagnostic  Post LP 4/7 which was again undiagnostic.   MRI of the brain from 4/11-\"Interval enlargement of the dominant left anterior basal ganglionic mass lesion with greater surrounding vasogenic edema and mass effect. 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 Differential considerations include lymphoma, multicentric high-grade glioma, and less likely metastasis\"  S/p brain bx 4/14 by neurosurgery   Nondiagnostic LPs  Patient pulled out EVD over the weekend  Discussed with daughter and appears that patient is more " improved at this time  Heme-onc on board  Plan for high-dose methotrexate every 2 weeks last received on 4/18, planned on 5/1.  Rituximab weekly due next week 4/25  Patient needs leucovorin 50 mg every 6 hours    LETY (acute kidney injury) (HCC)  Lab Results   Component Value Date    CREATININE 2.77 (H) 04/22/2025    CREATININE 1.76 (H) 04/21/2025    CREATININE 0.93 04/20/2025       Lab Results   Component Value Date    EGFR 22 04/22/2025    EGFR 39 04/21/2025    EGFR 85 04/20/2025   Creatinine continues to uptrend.  Clear in the setting of hypotension episode and methotrexate toxicity  Continue IV fluids per nephrology    Encephalopathy  Worsening, patient is more lethargic today  Due to brain mass as above  Patient with acute onset confusion, forgetting names/places, oriented to self only most of the times  Delirium precautions  Patient has improving encephalopathy according to the daughter.    Type 2 diabetes mellitus with hyperglycemia, without long-term current use of insulin (HCC)  Has been well-controlled as outpatient with hemoglobin A1c of 6.6%  Hold home oral medications, start correctional insulin coverage   Accu-Cheks reviewed and acceptable  Continue hypoglycemia protocol and carb controlled diet  Mixed hyperlipidemia  Continue statin  HTN, goal below 130/80  Continue losartan and hydrochlorothiazide  BP reviewed and acceptable  Liver lesion  MRI obtained: No suspicious hepatic lesions.  There is a simple right hepatic lobe cyst.  Mild diffuse hepatic steatosis.  LFTs stable  Outpatient follow up advised  Thyroid nodule  Incidental findings on CT scan  Nonemergent thyroid ultrasound for follow-up in the outpatient setting  Pulmonary nodule  Imaging with 4 mm right lower lobe pulmonary nodule.  CT chest 3-month follow-up  Constipation  Continue senna, MiraLAX and suppository  Ambulate patient as able  Ambulatory dysfunction  PT/OT recommending level 2 at discharge  Awaiting medically stability  Ambulate  patient as able  Fall precautions  Goals of care, counseling/discussion    Palliative care encounter    Primary CNS lymphoma      VTE Pharmacologic Prophylaxis: VTE Score: 5 High Risk (Score >/= 5) - Pharmacological DVT Prophylaxis Ordered: heparin. Sequential Compression Devices Ordered.    Mobility:   Basic Mobility Inpatient Raw Score: 15  -HLM Goal: 4: Move to chair/commode  JH-HLM Achieved: 1: Laying in bed  JH-HLM Goal NOT achieved. Continue with multidisciplinary rounding and encourage appropriate mobility to improve upon -HLM goals.    Patient Centered Rounds: I performed bedside rounds with nursing staff today.   Discussions with Specialists or Other Care Team Provider: Palliative, nephrology    Education and Discussions with Family / Patient: Updated  (daughter) via phone.    Current Length of Stay: 24 day(s)  Current Patient Status: Inpatient   Certification Statement: The patient will continue to require additional inpatient hospital stay due to AMS, milton  Discharge Plan: Anticipate discharge in 24-48 hrs to rehab facility.    Code Status: Level 1 - Full Code    Subjective   Patient has no complaints at this time    Objective :  Temp:  [97.4 °F (36.3 °C)-97.8 °F (36.6 °C)] 97.4 °F (36.3 °C)  HR:  [48-64] 53  BP: (106-146)/(60-69) 138/69  Resp:  [14-20] 14  SpO2:  [93 %-99 %] 93 %  O2 Device: None (Room air)    Body mass index is 22.74 kg/m².     Input and Output Summary (last 24 hours):     Intake/Output Summary (Last 24 hours) at 4/22/2025 1329  Last data filed at 4/21/2025 1830  Gross per 24 hour   Intake 1667.5 ml   Output 485 ml   Net 1182.5 ml       Physical Exam  Vitals and nursing note reviewed.   Constitutional:       Appearance: He is well-developed and normal weight.      Comments: Currently in restraints   HENT:      Head: Normocephalic and atraumatic.      Nose: Nose normal.      Mouth/Throat:      Mouth: Mucous membranes are moist.   Eyes:      Conjunctiva/sclera:  Conjunctivae normal.   Cardiovascular:      Rate and Rhythm: Normal rate and regular rhythm.      Heart sounds: Normal heart sounds. No murmur heard.  Pulmonary:      Effort: Pulmonary effort is normal. No respiratory distress.      Breath sounds: Normal breath sounds.   Abdominal:      General: Abdomen is flat.      Palpations: Abdomen is soft.      Tenderness: There is no abdominal tenderness.   Genitourinary:     Comments: Dupont in place  Musculoskeletal:         General: No swelling.      Cervical back: Neck supple.      Right lower leg: No edema.      Left lower leg: No edema.   Skin:     General: Skin is warm and dry.      Capillary Refill: Capillary refill takes less than 2 seconds.   Neurological:      Mental Status: He is alert. He is disoriented.   Psychiatric:         Mood and Affect: Mood normal.           Lines/Drains:  Lines/Drains/Airways       Active Status       Name Placement date Placement time Site Days    PICC Line 04/16/25 Right Basilic 04/16/25  1526  Basilic  5    External Urinary Catheter Medium 04/20/25  0915  -- 2                    Central Line:  Goal for removal: Will discontinue when hemodynamically stable.               Lab Results: I have reviewed the following results:   Results from last 7 days   Lab Units 04/22/25  0515 04/20/25  0534 04/19/25  0719   WBC Thousand/uL 10.29*   < > 10.29*   HEMOGLOBIN g/dL 14.7   < > 13.5   HEMATOCRIT % 41.1   < > 36.0*   PLATELETS Thousands/uL 154   < > 87*   SEGS PCT %  --   --  89*   LYMPHO PCT % 9*  --  5*   MONO PCT % 0*  --  5   EOS PCT % 0  --  0    < > = values in this interval not displayed.     Results from last 7 days   Lab Units 04/22/25  0515 04/18/25  0512 04/17/25  0621   SODIUM mmol/L 142   < > 136   POTASSIUM mmol/L 4.2   < > 4.1   CHLORIDE mmol/L 96   < > 104   CO2 mmol/L 35*   < > 26   BUN mg/dL 55*   < > 23   CREATININE mg/dL 2.77*   < > 0.76   ANION GAP mmol/L 11   < > 6   CALCIUM mg/dL 9.1   < > 9.1   ALBUMIN g/dL  --   --  3.7    TOTAL BILIRUBIN mg/dL  --   --  0.50   ALK PHOS U/L  --   --  44   ALT U/L  --   --  33   AST U/L  --   --  11*   GLUCOSE RANDOM mg/dL 153*   < > 187*    < > = values in this interval not displayed.         Results from last 7 days   Lab Units 04/22/25  1053 04/22/25  0617 04/21/25  2112 04/21/25  1724 04/21/25  1130 04/21/25  0709 04/21/25  0510 04/20/25  2252 04/20/25  1601 04/20/25  1207 04/20/25  0912 04/19/25  2217   POC GLUCOSE mg/dl 119 135 198* 200* 224* 130 127 169* 225* 186* 216* 146*               Recent Cultures (last 7 days):           Last 24 Hours Medication List:     Current Facility-Administered Medications:     acetaminophen (TYLENOL) tablet 650 mg, Q6H PRN    allopurinol (ZYLOPRIM) tablet 300 mg, Daily    alteplase (CATHFLO) injection 2 mg, Q1MIN PRN    aluminum-magnesium hydroxide-simethicone (MAALOX) oral suspension 30 mL, Q4H PRN    atorvastatin (LIPITOR) tablet 20 mg, Daily    bisacodyl (DULCOLAX) rectal suppository 10 mg, Daily    calcium carbonate (TUMS) chewable tablet 1,000 mg, Daily PRN    chlorhexidine (PERIDEX) 0.12 % oral rinse 15 mL, Q12H CAIT    [Held by provider] Cholecalciferol (VITAMIN D3) tablet 2,000 Units, Daily    dexamethasone (DECADRON) injection 4 mg, Q6H CAIT    heparin (porcine) subcutaneous injection 5,000 Units, Q8H CAIT    HYDROmorphone HCl (DILAUDID) injection 0.2 mg, Q2H PRN    insulin glargine (LANTUS) subcutaneous injection 10 Units 0.1 mL, HS    insulin lispro (HumALOG/ADMELOG) 100 units/mL subcutaneous injection 5-25 Units, TID AC **AND** Fingerstick Glucose (POCT), TID AC    insulin lispro (HumALOG/ADMELOG) 100 units/mL subcutaneous injection 5-25 Units, HS    leucovorin (WELLCOVORIN) tablet 50 mg, Q6H    levETIRAcetam (KEPPRA) tablet 500 mg, Q12H CAIT    melatonin tablet 6 mg, HS    OLANZapine (ZyPREXA) tablet 5 mg, HS    OLANZapine (ZyPREXA) tablet 5 mg, Daily PRN    ondansetron (ZOFRAN) injection 4 mg, Q6H PRN    oxyCODONE (ROXICODONE) split tablet 2.5 mg, Q4H  PRN **OR** oxyCODONE (ROXICODONE) IR tablet 5 mg, Q4H PRN    pantoprazole (PROTONIX) EC tablet 40 mg, Early Morning    polyethylene glycol (MIRALAX) packet 17 g, Daily    senna (SENOKOT) tablet 17.2 mg, HS    sodium bicarbonate 75 mEq in sodium chloride 0.45 % 1,000 mL infusion, Continuous, Last Rate: 125 mL/hr (04/22/25 1244)    sodium chloride 0.9 % infusion, Once PRN, Last Rate: Stopped (04/17/25 2051)    sodium chloride 0.9 % infusion, Once PRN    Administrative Statements   Today, Patient Was Seen By: Rustam Drummond MD  I have spent a total time of 40 minutes in caring for this patient on the day of the visit/encounter including Diagnostic results, Prognosis, Risks and benefits of tx options, Instructions for management, Patient and family education, Importance of tx compliance, Risk factor reductions, Impressions, Counseling / Coordination of care, Documenting in the medical record, Reviewing/placing orders in the medical record (including tests, medications, and/or procedures), Obtaining or reviewing history  , and Communicating with other healthcare professionals .    **Please Note: This note may have been constructed using a voice recognition system.**

## 2025-04-22 NOTE — RESTORATIVE TECHNICIAN NOTE
Restorative Technician Note      Patient Name: Alexis Dennison     Note Type: Mobility  Patient Position Upon Consult: Supine  Activity Performed: Dangled; Ambulated; Stood  Assistive Device: Other (Comment) (Assist x1-2, Assisted PT Ellen.)  Education Provided: Yes  Patient Position at End of Consult: Supine; All needs within reach; Bed/Chair alarm activated; Other (comment) (Posey and B/L wrist restraints on.)    Cande Fowler BS, Restorative Technician,

## 2025-04-22 NOTE — PHYSICAL THERAPY NOTE
PHYSICAL THERAPY NOTE          Patient Name: Alexis Dennison  Today's Date: 4/22/2025 04/22/25 1137   PT Last Visit   PT Visit Date 04/22/25   Note Type   Note Type Treatment   Pain Assessment   Pain Assessment Tool 0-10   Pain Score No Pain   Restrictions/Precautions   Weight Bearing Precautions Per Order No   Other Precautions Cognitive;Chair Alarm;Bed Alarm;Impulsive;Multiple lines;Restraints;Fall Risk  (b/l soft wrist restraints, posey, b/l hand mitts)   General   Chart Reviewed Yes   Response to Previous Treatment Patient with no complaints from previous session.   Family/Caregiver Present Yes  (wife)   Cognition   Overall Cognitive Status Impaired   Arousal/Participation Alert;Responsive   Attention Attends with cues to redirect   Orientation Level Oriented to person;Disoriented to place;Disoriented to time;Disoriented to situation   Memory Decreased recall of recent events;Decreased recall of precautions;Decreased short term memory   Following Commands Follows one step commands with increased time or repetition   Comments pt cooperative throughout session with cues to redirect   Subjective   Subjective pt agreeable to mobilize   Bed Mobility   Supine to Sit 4  Minimal assistance   Additional items Assist x 1;HOB elevated;Increased time required;Verbal cues   Sit to Supine 4  Minimal assistance   Additional items Assist x 1;Increased time required;Verbal cues;LE management;Impulsive   Transfers   Sit to Stand 3  Moderate assistance   Additional items Assist x 1;Increased time required;Verbal cues   Stand to Sit 3  Moderate assistance   Additional items Assist x 1;Increased time required;Verbal cues   Additional Comments HHA   Ambulation/Elevation   Gait pattern Improper Weight shift;Inconsistent maria esther;Short stride;Narrow IRMA   Gait Assistance 3  Moderate assist  (SBA of 2nd)   Additional items Assist x 1;Verbal cues    Assistive Device Other (Comment)  (HHA, at times attempting to hold onto IV pole)   Distance 70'+150'+80'   Stair Management Assistance Not tested   Balance   Static Sitting Fair   Dynamic Sitting Fair -   Static Standing Poor +   Dynamic Standing Poor   Ambulatory Poor   Endurance Deficit   Endurance Deficit Yes   Endurance Deficit Description pt limited by cognition, fatigue and generalized weakness   Activity Tolerance   Activity Tolerance Other (Comment)  (cognition)   Medical Staff Made Aware restorative Cande   Nurse Made Aware yes-RN cleared   Exercises   Knee AROM Long Arc Quad Sitting;10 reps;AROM;Bilateral   Balance training  pt sitting EOB for 8 minutes with CGA-S level to eat lunch and perform LE exercises   Assessment   Prognosis Fair   Problem List Decreased strength;Decreased endurance;Decreased mobility;Impaired balance;Decreased coordination;Decreased cognition;Decreased safety awareness;Impaired judgement   Assessment Pt pleasant and agreeable to participate in PT session. Completes mobility and therapeutic activity as outlined above. Pt able to improve ambulation distance today compared to previous session with mod Ax1 HHA and SBA of 2nd person for safety. Pt requires cues for improving step length. Pt impulsive at times throughout session requires cues to redirect. Pt progressing towards his goals and will continue to benefit from skilled acute PT services to address deficits and promote mobility. Pt left supine in bed with restraints donned, call bell and personal items within reach and all needs met.   Barriers to Discharge Inaccessible home environment;Decreased caregiver support   Goals   Patient Goals to eat lunch   STG Expiration Date 04/27/25   PT Treatment Day 5   Plan   Treatment/Interventions Functional transfer training;LE strengthening/ROM;Therapeutic exercise;Endurance training;Elevations;Patient/family training;Cognitive reorientation;Gait training;Bed mobility;Equipment  eval/education;Compensatory technique education;Continued evaluation;Spoke to nursing   Progress Progressing toward goals   PT Frequency 3-5x/wk   Discharge Recommendation   Rehab Resource Intensity Level, PT I (Maximum Resource Intensity)   AM-PAC Basic Mobility Inpatient   Turning in Flat Bed Without Bedrails 3   Lying on Back to Sitting on Edge of Flat Bed Without Bedrails 2   Moving Bed to Chair 2   Standing Up From Chair Using Arms 2   Walk in Room 2   Climb 3-5 Stairs With Railing 2   Basic Mobility Inpatient Raw Score 13   Basic Mobility Standardized Score 33.99   Turning Head Towards Sound 4   Follow Simple Instructions 3   Low Function Basic Mobility Raw Score  20   Low Function Basic Mobility Standardized Score  32.8   [facility] Highest Level Of Mobility   -[facility] Goal 4: Move to chair/commode   -[facility] Achieved 7: Walk 25 feet or more   Education   Education Provided Mobility training;Home exercise program   Patient Reinforcement needed   End of Consult   Patient Position at End of Consult Supine;Bed/Chair alarm activated;All needs within reach   RAGHU AliceaT

## 2025-04-22 NOTE — ASSESSMENT & PLAN NOTE
"Palliative diagnosis: newly diagnosed large B cell lymphoma    Symptom management:  Discussed with SLIM  Plan for 5mg olanzapine HS and 5mg daily PRN agitation  Goal to wean patient from restraints to enable therapy/discharge planning  Continues on decadron 4mg Q6H, wean as appropriate  oxyIR 2.5-5mg PO Q4H PRN moderate-severe pain  Dilaudid 0.2mg IV Q2H PRN BT pain  Acetaminophen 650mg Q6H PRN mild pain  Bowel regimen in place  Notified primary team that patient uses CPAP HS    Goals:  Level 1 code status  Disease focused care without limits placed  Patient initiated high-dose methotrexate + rituximab inpatient  Per oncology note methotrexate biweekly (next dose 5/1) and rituximab weekly    Decisional apparatus:  Patient does not have capacity on exam today.  If capacity is lost, patient's substitute decision maker would default to spouse and adult children by [place] Act 169.  ER contacts:  Elizabeth Chester  Daughter  [phone]  Guy Hitchcock \"Naldo\" Phillip  Spouse  [phone]    Patient also has a son, Drew, and another daughter, Marina, who has down syndrome. Elizabeth is the main contact but family makes decisions as unit.    Advance Directive/Living Will/POLST: none on file    Social support:  Patient support system: spouse Naldo, 3 adult children, extended family.  Spouse Naldo lives locally and daughter Elizabeth is from [place] but Naldo prefers that she is primary contact due to language barrier (Chinese)  Met with Naldo along with palliative SW (Pato) and  (Kaylee #[id number]).  Provided medical update, supportive listening, answered questions to her satisfaction  Naldo asks for oncology feedback on prognosis, this was relayed to their team  Supportive listening provided  Normalized experience of patient/family  Provided anticipatory guidance  Advocated for patient/family with interdisciplinary care team    Coordination of care:  Reviewed case with RN, primary team    Follow up  Palliative Care will continue to " follow for support and goals of care discussions will be ongoing pending course.  Please reach out via [employer] secure chat if questions or concerns arise.    We appreciate the invitation to be involved in this patient's care.

## 2025-04-22 NOTE — ASSESSMENT & PLAN NOTE
"Patient with development of worsening confusion, recently seen at the [facility] ED 3/24/2025 with CT head at that time with finding of brain lesion for which MRI was advised to exclude cancerous etiology.     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  Lymphoma/leukemia panel nondiagnostic  CT head on 4/3 with interval increase in ventricular caliber concerning for worsening hydrocephalus  No indication for AED per neurosurgery and holding off steroids until Dx achieved  Continue neurochecks   Neurosurgery and oncology recommended repeat high-volume LP as previous was nondiagnostic  Post LP 4/7 which was again undiagnostic.   MRI of the brain from 4/11-\"Interval enlargement of the dominant left anterior basal ganglionic mass lesion with greater surrounding vasogenic edema and mass effect. 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 Differential considerations include lymphoma, multicentric high-grade glioma, and less likely metastasis\"  S/p brain bx 4/14 by neurosurgery   Nondiagnostic LPs  Patient pulled out EVD over the weekend  Discussed with daughter and appears that patient is more improved at this time  Heme-onc on board  Plan for high-dose methotrexate every 2 weeks last received on 4/18, planned on 5/1.  Rituximab weekly due next week 4/25  Patient needs leucovorin 50 mg every 6 hours    "

## 2025-04-22 NOTE — PLAN OF CARE
Problem: PAIN - ADULT  Goal: Verbalizes/displays adequate comfort level or baseline comfort level  Description: Interventions:- Encourage patient to monitor pain and request assistance- Assess pain using appropriate pain scale- Administer analgesics based on type and severity of pain and evaluate response- Implement non-pharmacological measures as appropriate and evaluate response- Notify physician/advanced practitioner if interventions unsuccessful or patient reports new pain  4/22/2025 0331 by Alesia Owens  Outcome: Progressing  4/22/2025 0327 by Alesia Owens  Outcome: Progressing     Problem: INFECTION - ADULT  Goal: Absence or prevention of progression during hospitalization  Description: INTERVENTIONS:- Assess and monitor for signs and symptoms of infection- Monitor lab/diagnostic results- Monitor all insertion sites, i.e. indwelling lines, tubes, and drains- Petty appropriate cooling/warming therapies per order- Administer medications as ordered- Instruct and encourage patient and family to use good hand hygiene technique- Identify and instruct in appropriate isolation precautions for identified infection/condition  4/22/2025 0331 by Alesia Owens  Outcome: Progressing  4/22/2025 0327 by Alesia Owens  Outcome: Progressing     Problem: SAFETY ADULT  Goal: Patient will remain free of falls  Description: INTERVENTIONS:- Educate patient/family on patient safety including physical limitations- Instruct patient to call for assistance with activity - Consult OT/PT to assist with strengthening/mobility - Keep Call bell within reach- Keep bed low and locked with side rails adjusted as appropriate- Keep care items and personal belongings within reach- Initiate and maintain comfort rounds- Make Fall Risk Sign visible to staff- Offer Toileting every 2 Hours, in advance of need- Initiate/Maintain bed/chair alarm- Obtain necessary fall risk management equipment.- Apply yellow socks and bracelet for high  fall risk patients- Consider moving patient to room near nurses station  INTERVENTIONS:- Educate patient/family on patient safety including physical limitations- Instruct patient to call for assistance with activity - Consult OT/PT to assist with strengthening/mobility - Keep Call bell within reach- Keep bed low and locked with side rails adjusted as appropriate- Keep care items and personal belongings within reach- Initiate and maintain comfort rounds- Make Fall Risk Sign visible to staff- Offer Toileting every 2 Hours, in advance of need- Initiate/Maintain bed alarm- Obtain necessary fall risk management equipment: bracelet, socks, alarms- Apply yellow socks and bracelet for high fall risk patients- Consider moving patient to room near nurses station  4/22/2025 0331 by Alesia Owens  Outcome: Progressing  4/22/2025 0327 by Alesia Owens  Outcome: Progressing  Goal: Maintain or return to baseline ADL function  Description: INTERVENTIONS:-  Assess patient's ability to carry out ADLs; assess patient's baseline for ADL function and identify physical deficits which impact ability to perform ADLs (bathing, care of mouth/teeth, toileting, grooming, dressing, etc.)- Assess/evaluate cause of self-care deficits - Assess range of motion- Assess patient's mobility; develop plan if impaired- Assess patient's need for assistive devices and provide as appropriate- Encourage maximum independence but intervene and supervise when necessary- Involve family in performance of ADLs- Assess for home care needs following discharge - Consider OT consult to assist with ADL evaluation and planning for discharge- Provide patient education as appropriate  4/22/2025 0331 by Alesia Owens  Outcome: Progressing  4/22/2025 0327 by Alesia Owens  Outcome: Progressing  Goal: Maintains/Returns to pre admission functional level  Description: INTERVENTIONS:- Perform AM-PAC 6 Click Basic Mobility/ Daily Activity assessment daily.- Set and  communicate daily mobility goal to care team and patient/family/caregiver. - Collaborate with rehabilitation services on mobility goals if consulted- Reposition patient every 2 hours.- Dangle patient 3 times a day- Stand patient 3 times a day- Ambulate patient 3 times a day- Out of bed to chair 3 times a day - Out of bed for meals 3 times a day- Out of bed for toileting- Record patient progress and toleration of activity level   4/22/2025 0331 by Alesia Owens  Outcome: Progressing  4/22/2025 0327 by Alesia Owens  Outcome: Progressing     Problem: DISCHARGE PLANNING  Goal: Discharge to home or other facility with appropriate resources  Description: INTERVENTIONS:- Identify barriers to discharge w/patient and caregiver- Arrange for needed discharge resources and transportation as appropriate- Identify discharge learning needs (meds, wound care, etc.)- Refer to Case Management Department for coordinating discharge planning if the patient needs post-hospital services based on physician/advanced practitioner order or complex needs related to functional status, cognitive ability, or social support system  4/22/2025 0331 by Alesia Owens  Outcome: Progressing  4/22/2025 0327 by Alesia Owens  Outcome: Progressing     Problem: Knowledge Deficit  Goal: Patient/family/caregiver demonstrates understanding of disease process, treatment plan, medications, and discharge instructions  Description: Complete learning assessment and assess knowledge base.Interventions:- Provide teaching at level of understanding- Provide teaching via preferred learning methods  4/22/2025 0331 by Alesia Owens  Outcome: Progressing  4/22/2025 0327 by Alesia Owens  Outcome: Progressing     Problem: NEUROSENSORY - ADULT  Goal: Achieves stable or improved neurological status  Description: INTERVENTIONS- Monitor and report changes in neurological status- Monitor vital signs such as temperature, blood pressure, glucose, and any other labs  ordered - Initiate measures to prevent increased intracranial pressure- Monitor for seizure activity and implement precautions if appropriate    INTERVENTIONS- Monitor and report changes in neurological status- Monitor vital signs such as temperature, blood pressure, glucose, and any other labs ordered - Initiate measures to prevent increased intracranial pressure- Monitor for seizure activity and implement precautions if appropriate    4/22/2025 0331 by lAesia Owens  Outcome: Progressing  4/22/2025 0327 by Alesia Owens  Outcome: Progressing  Goal: Remains free of injury related to seizures activity  Description: INTERVENTIONS- Maintain airway, patient safety  and administer oxygen as ordered- Monitor patient for seizure activity, document and report duration and description of seizure to physician/advanced practitioner- If seizure occurs,  ensure patient safety during seizure- Reorient patient post seizure- Seizure pads on all 4 side rails- Instruct patient/family to notify RN of any seizure activity including if an aura is experienced- Instruct patient/family to call for assistance with activity based on nursing assessment- Administer anti-seizure medications if ordered  INTERVENTIONS- Maintain airway, patient safety  and administer oxygen as ordered- Monitor patient for seizure activity, document and report duration and description of seizure to physician/advanced practitioner- If seizure occurs,  ensure patient safety during seizure- Reorient patient post seizure- Seizure pads on all 4 side rails- Instruct patient/family to notify RN of any seizure activity including if an aura is experienced- Instruct patient/family to call for assistance with activity based on nursing assessment- Administer anti-seizure medications if ordered  4/22/2025 0331 by Alesia Owens  Outcome: Progressing  4/22/2025 0327 by Alesia Owens  Outcome: Progressing  Goal: Achieves maximal functionality and self care  Description:  INTERVENTIONS- Monitor swallowing and airway patency with patient fatigue and changes in neurological status- Encourage and assist patient to increase activity and self care. - Encourage visually impaired, hearing impaired and aphasic patients to use assistive/communication devices  INTERVENTIONS- Monitor swallowing and airway patency with patient fatigue and changes in neurological status- Encourage and assist patient to increase activity and self care. - Encourage visually impaired, hearing impaired and aphasic patients to use assistive/communication devices  4/22/2025 0331 by Alesia Owens  Outcome: Progressing  4/22/2025 0327 by Alesia Owens  Outcome: Progressing     Problem: METABOLIC, FLUID AND ELECTROLYTES - ADULT  Goal: Glucose maintained within target range  Description: INTERVENTIONS:- Monitor Blood Glucose as ordered- Assess for signs and symptoms of hyperglycemia and hypoglycemia- Administer ordered medications to maintain glucose within target range- Assess nutritional intake and initiate nutrition service referral as needed  INTERVENTIONS:- Monitor Blood Glucose as ordered- Assess for signs and symptoms of hyperglycemia and hypoglycemia- Administer ordered medications to maintain glucose within target range- Assess nutritional intake and initiate nutrition service referral as needed  4/22/2025 0331 by Alesia Owens  Outcome: Progressing  4/22/2025 0327 by Alesia Owens  Outcome: Progressing  Goal: Electrolytes maintained within normal limits  Description: INTERVENTIONS:- Monitor labs and assess patient for signs and symptoms of electrolyte imbalances- Administer electrolyte replacement as ordered- Monitor response to electrolyte replacements, including repeat lab results as appropriate- Instruct patient on fluid and nutrition as appropriate  4/22/2025 0331 by Alesia Owens  Outcome: Progressing  4/22/2025 0327 by Alesia Owens  Outcome: Progressing  Goal: Fluid balance  maintained  Description: INTERVENTIONS:- Monitor labs - Monitor I/O and WT- Instruct patient on fluid and nutrition as appropriate- Assess for signs & symptoms of volume excess or deficit  4/22/2025 0331 by Alesia Owens  Outcome: Progressing  4/22/2025 0327 by Alesia Owens  Outcome: Progressing     Problem: Prexisting or High Potential for Compromised Skin Integrity  Goal: Skin integrity is maintained or improved  Description: INTERVENTIONS:- Identify patients at risk for skin breakdown- Assess and monitor skin integrity- Assess and monitor nutrition and hydration status- Monitor labs - Assess for incontinence - Turn and reposition patient- Assist with mobility/ambulation- Relieve pressure over bony prominences- Avoid friction and shearing- Provide appropriate hygiene as needed including keeping skin clean and dry- Evaluate need for skin moisturizer/barrier cream- Collaborate with interdisciplinary team - Patient/family teaching- Consider wound care consult   4/22/2025 0331 by Alesia Owens  Outcome: Progressing  4/22/2025 0327 by Alesia Owens  Outcome: Progressing     Problem: SAFETY,RESTRAINT: NV/NON-SELF DESTRUCTIVE BEHAVIOR  Goal: Remains free of harm/injury (restraint for non violent/non self-detsructive behavior)  Description: INTERVENTIONS:- Instruct patient/family regarding restraint use - Assess and monitor physiologic and psychological status - Provide interventions and comfort measures to meet assessed patient needs - Identify and implement measures to help patient regain control- Assess readiness for release of restraint   4/22/2025 0331 by Alesia Owens  Outcome: Progressing  4/22/2025 0327 by Alesia Owens  Outcome: Progressing  Goal: Returns to optimal restraint-free functioning  Description: INTERVENTIONS:- Assess the patient's behavior and symptoms that indicate continued need for restraint- Identify and implement measures to help patient regain control- Assess readiness for release  of restraint   4/22/2025 0331 by Alesia Owens  Outcome: Progressing  4/22/2025 0327 by Alesia Owens  Outcome: Progressing     Problem: CARDIOVASCULAR - ADULT  Goal: Maintains optimal cardiac output and hemodynamic stability  Description: INTERVENTIONS:- Monitor I/O, vital signs and rhythm- Monitor for S/S and trends of decreased cardiac output- Administer and titrate ordered vasoactive medications to optimize hemodynamic stability- Assess quality of pulses, skin color and temperature- Assess for signs of decreased coronary artery perfusion- Instruct patient to report change in severity of symptoms  4/22/2025 0331 by Alesia Owens  Outcome: Progressing  4/22/2025 0327 by Alesia Owens  Outcome: Progressing  Goal: Absence of cardiac dysrhythmias or at baseline rhythm  Description: INTERVENTIONS:- Continuous cardiac monitoring, vital signs, obtain 12 lead EKG if ordered- Administer antiarrhythmic and heart rate control medications as ordered- Monitor electrolytes and administer replacement therapy as ordered  4/22/2025 0331 by Alesia Owens  Outcome: Progressing  4/22/2025 0327 by Alesia Owens  Outcome: Progressing     Problem: GASTROINTESTINAL - ADULT  Goal: Maintains or returns to baseline bowel function  Description: INTERVENTIONS:- Assess bowel function- Encourage oral fluids to ensure adequate hydration- Administer IV fluids if ordered to ensure adequate hydration- Administer ordered medications as needed- Encourage mobilization and activity- Consider nutritional services referral to assist patient with adequate nutrition and appropriate food choices  4/22/2025 0331 by Alesia Owens  Outcome: Progressing  4/22/2025 0327 by Alesia Owens  Outcome: Progressing  Goal: Maintains adequate nutritional intake  Description: INTERVENTIONS:- Monitor percentage of each meal consumed- Identify factors contributing to decreased intake, treat as appropriate- Assist with meals as needed- Monitor I&O, weight,  and lab values if indicated- Obtain nutrition services referral as needed  4/22/2025 0331 by Alesia Owens  Outcome: Progressing  4/22/2025 0327 by Alesia Owens  Outcome: Progressing     Problem: GENITOURINARY - ADULT  Goal: Maintains or returns to baseline urinary function  Description: INTERVENTIONS:- Assess urinary function- Encourage oral fluids to ensure adequate hydration if ordered- Administer IV fluids as ordered to ensure adequate hydration- Administer ordered medications as needed- Offer frequent toileting- Follow urinary retention protocol if ordered  4/22/2025 0331 by Alesia Owens  Outcome: Progressing  4/22/2025 0327 by Alesia Owens  Outcome: Progressing  Goal: Absence of urinary retention  Description: INTERVENTIONS:- Assess patient’s ability to void and empty bladder- Monitor I/O- Bladder scan as needed- Discuss with physician/AP medications to alleviate retention as needed- Discuss catheterization for long term situations as appropriate  4/22/2025 0331 by Alesia Owens  Outcome: Progressing  4/22/2025 0327 by Alesia Owens  Outcome: Progressing     Problem: SKIN/TISSUE INTEGRITY - ADULT  Goal: Skin Integrity remains intact(Skin Breakdown Prevention)  Description: Assess:-Perform [place] assessment every shift-Clean and moisturize skin every 2-Inspect skin when repositioning, toileting, and assisting with ADLS-Assess under medical devices such as restraints every 2-Assess extremities for adequate circulation and sensation Bed Management:-Have minimal linens on bed & keep smooth, unwrinkled-Change linens as needed when moist or perspiring-Avoid sitting or lying in one position for more than 2 hours while in bed-Keep HOB at 30degrees Toileting:-Offer bedside commode-Assess for incontinence every 2-Use incontinent care products after each incontinent episode such as proper cleaning equipment Activity:-Mobilize patient 3 times a day-Encourage activity and walks on unit-Encourage or provide  ROM exercises -Turn and reposition patient every 2 Hours-Use appropriate equipment to lift or move patient in bed-Instruct/ Assist with weight shifting every 2 hours  when out of bed in chair-Consider limitation of chair time 2 hour intervalsSkin Care:-Avoid use of baby powder, tape, friction and shearing, hot water or constrictive clothing-Relieve pressure over bony prominences using 2-Do not massage red bony areasNext Steps:-Teach patient strategies to minimize risks such as pressure injuries -  4/22/2025 0331 by Alesia Owens  Outcome: Progressing  4/22/2025 0327 by Alesia Owens  Outcome: Progressing  Goal: Incision(s), wounds(s) or drain site(s) healing without S/S of infection  Description: INTERVENTIONS- Assess and document dressing, incision, wound bed, drain sites and surrounding tissue- Provide patient and family education- Perform skin care/dressing changes as needed  4/22/2025 0331 by Alesia Owens  Outcome: Progressing  4/22/2025 0327 by Alesia Owens  Outcome: Progressing     Problem: HEMATOLOGIC - ADULT  Goal: Maintains hematologic stability  Description: INTERVENTIONS- Assess for signs and symptoms of bleeding or hemorrhage- Monitor labs- Administer supportive blood products/factors as ordered and appropriate  4/22/2025 0331 by Alesia Owens  Outcome: Progressing  4/22/2025 0327 by Alesia Owens  Outcome: Progressing     Problem: MUSCULOSKELETAL - ADULT  Goal: Maintain or return mobility to safest level of function  Description: INTERVENTIONS:- Assess patient's ability to carry out ADLs; assess patient's baseline for ADL function and identify physical deficits which impact ability to perform ADLs (bathing, care of mouth/teeth, toileting, grooming, dressing, etc.)- Assess/evaluate cause of self-care deficits - Assess range of motion- Assess patient's mobility- Assess patient's need for assistive devices and provide as appropriate- Encourage maximum independence but intervene and supervise  when necessary- Involve family in performance of ADLs- Assess for home care needs following discharge - Consider OT consult to assist with ADL evaluation and planning for discharge- Provide patient education as appropriate  4/22/2025 0331 by Alesia Owens  Outcome: Progressing  4/22/2025 0327 by Alesia Owens  Outcome: Progressing     Problem: Nutrition/Hydration-ADULT  Goal: Nutrient/Hydration intake appropriate for improving, restoring or maintaining nutritional needs  Description: Monitor and assess patient's nutrition/hydration status for malnutrition. Collaborate with interdisciplinary team and initiate plan and interventions as ordered.  Monitor patient's weight and dietary intake as ordered or per policy. Utilize nutrition screening tool and intervene as necessary. Determine patient's food preferences and provide high-protein, high-caloric foods as appropriate. INTERVENTIONS:- Monitor oral intake, urinary output, labs, and treatment plans- Assess nutrition and hydration status and recommend course of action- Evaluate amount of meals eaten- Assist patient with eating if necessary - Allow adequate time for meals- Recommend/ encourage appropriate diets, oral nutritional supplements, and vitamin/mineral supplements- Order, calculate, and assess calorie counts as needed- Recommend, monitor, and adjust tube feedings and TPN/PPN based on assessed needs- Assess need for intravenous fluids- Provide specific nutrition/hydration education as appropriate- Include patient/family/caregiver in decisions related to nutrition  4/22/2025 0331 by Alesia Owens  Outcome: Progressing  4/22/2025 0327 by Alesia Owens  Outcome: Progressing     Problem: COPING  Goal: Pt/Family able to verbalize concerns and demonstrate effective coping strategies  Description: INTERVENTIONS:- Assist patient/family to identify coping skills, available support systems and cultural and spiritual values- Provide emotional support, including  active listening and acknowledgement of concerns of patient and caregivers- Reduce environmental stimuli, as able- Provide patient education- Assess for spiritual pain/suffering and initiate spiritual care, including notification of Pastoral Care or lucie based community as needed- Assess effectiveness of coping strategies  4/22/2025 0331 by Alesia Owens  Outcome: Progressing  4/22/2025 0327 by Alesia Owens  Outcome: Progressing  Goal: Will report anxiety at manageable levels  Description: INTERVENTIONS:- Administer medication as ordered- Teach and encourage coping skills- Provide emotional support- Assess patient/family for anxiety and ability to cope  4/22/2025 0331 by Alesia Owens  Outcome: Progressing  4/22/2025 0327 by Alesia Owens  Outcome: Progressing     Problem: Potential for Falls  Goal: Patient will remain free of falls  Description: INTERVENTIONS:- Educate patient/family on patient safety including physical limitations- Instruct patient to call for assistance with activity - Consult OT/PT to assist with strengthening/mobility - Keep Call bell within reach- Keep bed low and locked with side rails adjusted as appropriate- Keep care items and personal belongings within reach- Initiate and maintain comfort rounds- Make Fall Risk Sign visible to staff- Offer Toileting every 2 Hours, in advance of need- Initiate/Maintain bed/chair alarm- Obtain necessary fall risk management equipment.- Apply yellow socks and bracelet for high fall risk patients- Consider moving patient to room near nurses station  INTERVENTIONS:- Educate patient/family on patient safety including physical limitations- Instruct patient to call for assistance with activity - Consult OT/PT to assist with strengthening/mobility - Keep Call bell within reach- Keep bed low and locked with side rails adjusted as appropriate- Keep care items and personal belongings within reach- Initiate and maintain comfort rounds- Make Fall Risk Sign  visible to staff- Offer Toileting every 2 Hours, in advance of need- Initiate/Maintain bed alarm- Obtain necessary fall risk management equipment: bracelet, socks, alarms- Apply yellow socks and bracelet for high fall risk patients- Consider moving patient to room near nurses station  4/22/2025 0331 by Alesia Owens  Outcome: Progressing  4/22/2025 0327 by Alesia Owens  Outcome: Progressing

## 2025-04-22 NOTE — CASE MANAGEMENT
Case Management Discharge Planning Note    Patient name Alexis Dennison  Location [facility] MRN [id number]  : 1960 Date 2025       Current Admission Date: 3/29/2025  Current Admission Diagnosis:Brain tumor (HCC)   Patient Active Problem List    Diagnosis Date Noted Date Diagnosed    LETY (acute kidney injury) (HCC) 2025     Goals of care, counseling/discussion 2025     Palliative care encounter 2025     Primary CNS lymphoma 2025     Encephalopathy 2025     Constipation 2025     Ambulatory dysfunction 2025     Thyroid nodule 2025     Pulmonary nodule 2025     Liver lesion 2025     Brain tumor (HCC) 2025     Type 2 diabetes mellitus with hyperglycemia, without long-term current use of insulin (HCC) 2022     HTN, goal below 130/80 2022     Mixed hyperlipidemia 2022     Vitamin D deficiency 2022     NAFLD (nonalcoholic fatty liver disease) 2022       LOS (days): 24  Geometric Mean LOS (GMLOS) (days): 11.1  Days to GMLOS:-12.5     OBJECTIVE:  Risk of Unplanned Readmission Score: 33.83         Current admission status: Inpatient   Preferred Pharmacy:   [id number] - [place] - [address] [address]  [address] [address]  Phone: [phone] Fax: [phone]    [facility] - [place], [place] - [address]  [address]  [facility] [address]  Phone: [phone] Fax: [phone]    Primary Care Provider: PATI Mckeon    Primary Insurance: BLUE CROSS  Secondary Insurance: CAPITAL    DISCHARGE DETAILS:     Pending hem/onc   high-dose methotrexate + rituximab inpatient per oncology note methotrexate biweekly (next dose ) and rituximab weekly   Palliative involved, pt contact is daniel Vail. Referrals completed in [place] pt is appropriate for [facility]. Discharge TBD

## 2025-04-22 NOTE — PROGRESS NOTES
Progress Note - Nephrology   Name: Alexis Dennison 64 y.o. male I MRN: [id number]  Unit/Bed#: [facility] 731-01 I Date of Admission: 3/29/2025   Date of Service: 4/22/2025 I Hospital Day: 24    Assessment & Plan  LETY (acute kidney injury) (HCC)  - UA 2/22/25: Small blood, trace protein, pH 8.0, RBC 20-30, WBC 2-4  - Dupont cath in place - UO: 1.2L  - Creatinine increased to 2.77 this morning from yesterday likely to receiving methotrexate +/- periods of relative hypotension from 4/20.   - Continue to monitor kidney function/UA  - avoid nephrotoxins  - avoid hypotension  HTN, goal below 130/80  - BP stable  - avoid hypotension  - agree with continuing IVF, will adjust rate.   Brain tumor (HCC)  - primary CNS lymphoma as below  - neurosurgery signed off  Primary CNS lymphoma  - oncology on board  - keep urine pH >7 due to high doses of methotrexate, current pH 8.0.  Will adjust bicarb drip  - q6hr UA per oncology  - High-dose methotrexate C2 planned for 5/1  Type 2 diabetes mellitus with hyperglycemia, without long-term current use of insulin ([facility])  Lab Results   Component Value Date    HGBA1C 6.6 (H) 02/22/2025   - per primary team  Ambulatory dysfunction  - per primary team  Encephalopathy  - per primary team      Subjective   Patient seen and examined at bedside.  Bilateral wrist restraints as well as hand mitts and [place] belt on patient.  Patient awake in bed alert to himself only.  Confused otherwise.  Appears comfortable in bed. Patient denies shortness of breath and chest pain.  Indwelling Dupont catheter patent.       Objective :  Temp:  [97.4 °F (36.3 °C)-97.9 °F (36.6 °C)] 97.4 °F (36.3 °C)  HR:  [48-68] 53  BP: (106-146)/(60-73) 138/69  Resp:  [14-20] 14  SpO2:  [93 %-99 %] 93 %  O2 Device: None (Room air)    Current Weight: Weight - Scale: 71.9 kg (158 lb 8.2 oz)  First Weight: Weight - Scale: 86.2 kg (190 lb)  I/O         04/20 0701 04/21 0700 04/21 0701 04/22 0700 04/22 0701 04/23 0700    P.O. 840 480     I.V.  (mL/kg) 934.1 (13) 1427.5 (19.9)     IV Piggyback       Total Intake(mL/kg) 1774.1 (24.7) 1907.5 (26.5)     Urine (mL/kg/hr) 2445 (1.4) 1285 (0.7)     Total Output 2445 1285     Net -671 +622.5            Unmeasured Urine Occurrence 2 x            Physical Exam  Vitals and nursing note reviewed.   Constitutional:       General: He is not in acute distress.     Comments: B/L wrist restraints, hand mitts and posey belt on   Cardiovascular:      Rate and Rhythm: Normal rate.      Pulses: Normal pulses.      Heart sounds: No murmur heard.     No gallop.   Pulmonary:      Effort: No respiratory distress.      Breath sounds: Normal breath sounds. No wheezing or rales.   Abdominal:      General: Bowel sounds are normal. There is no distension.      Palpations: Abdomen is soft.      Tenderness: There is no abdominal tenderness.   Genitourinary:     Comments: Indwelling Dupont catheter  Musculoskeletal:      Right lower leg: No edema.      Left lower leg: No edema.   Skin:     General: Skin is warm and dry.      Capillary Refill: Capillary refill takes less than 2 seconds.   Neurological:      Mental Status: He is alert. He is disoriented.      Comments: Alert to himself only.  Confused to location, year, month         Medications:    Current Facility-Administered Medications:     acetaminophen (TYLENOL) tablet 650 mg, 650 mg, Oral, Q6H PRN, Rustam Durmmond MD    allopurinol (ZYLOPRIM) tablet 300 mg, 300 mg, Oral, Daily, Rustam Drummond MD, 300 mg at 04/21/25 0951    alteplase (CATHFLO) injection 2 mg, 2 mg, Intracatheter, Q1MIN PRN, Rustam Drummond MD    aluminum-magnesium hydroxide-simethicone (MAALOX) oral suspension 30 mL, 30 mL, Oral, Q4H PRN, Rustam Drummond MD    atorvastatin (LIPITOR) tablet 20 mg, 20 mg, Oral, Daily, Rustam Drummond MD, 20 mg at 04/21/25 0952    bisacodyl (DULCOLAX) rectal suppository 10 mg, 10 mg, Rectal, Daily, Rustam Drummond MD, 10 mg at 04/21/25  0924    calcium carbonate (TUMS) chewable tablet 1,000 mg, 1,000 mg, Oral, Daily PRN, Rustam Drummond MD    chlorhexidine (PERIDEX) 0.12 % oral rinse 15 mL, 15 mL, Mouth/Throat, Q12H CAIT, Rustam Drummond MD, 15 mL at 04/21/25 1007    [Held by provider] Cholecalciferol (VITAMIN D3) tablet 2,000 Units, 2,000 Units, Oral, Daily, Rustam Drummond MD, 2,000 Units at 04/14/25 0810    dexamethasone (DECADRON) injection 4 mg, 4 mg, Intravenous, Q6H CAIT, Rustam Drummond MD    heparin (porcine) subcutaneous injection 5,000 Units, 5,000 Units, Subcutaneous, Q8H CAIT, Rustam Drummond MD, 5,000 Units at 04/21/25 1441    HYDROmorphone HCl (DILAUDID) injection 0.2 mg, 0.2 mg, Intravenous, Q2H PRN, Rustam Drummond MD    insulin glargine (LANTUS) subcutaneous injection 10 Units 0.1 mL, 10 Units, Subcutaneous, HS, Rustam Drummond MD    insulin lispro (HumALOG/ADMELOG) 100 units/mL subcutaneous injection 5-25 Units, 5-25 Units, Subcutaneous, TID AC, 10 Units at 04/21/25 1731 **AND** Fingerstick Glucose (POCT), , , TID AC, Rustam Drummond MD    insulin lispro (HumALOG/ADMELOG) 100 units/mL subcutaneous injection 5-25 Units, 5-25 Units, Subcutaneous, HS, Rustam Drummond MD, 5 Units at 04/20/25 2253    leucovorin (WELLCOVORIN) tablet 50 mg, 50 mg, Oral, Q6H, Rustam Drummond MD    levETIRAcetam (KEPPRA) tablet 500 mg, 500 mg, Oral, Q12H CAIT, Rustam Drummond MD    melatonin tablet 6 mg, 6 mg, Oral, HS, Rustam Drummond MD    OLANZapine (ZyPREXA ZYDIS) dispersible tablet 10 mg, 10 mg, Oral, HS PRN, Rustam Drummond MD    ondansetron (ZOFRAN) injection 4 mg, 4 mg, Intravenous, Q6H PRN, Rustam Drummond MD, 4 mg at 04/14/25 0930    oxyCODONE (ROXICODONE) split tablet 2.5 mg, 2.5 mg, Oral, Q4H PRN **OR** oxyCODONE (ROXICODONE) IR tablet 5 mg, 5 mg, Oral, Q4H PRN, Rustam Drummond MD    pantoprazole (PROTONIX) EC tablet 40 mg, 40  "mg, Oral, Early Morning, Rustam Drummond MD, 40 mg at 04/21/25 0517    polyethylene glycol (MIRALAX) packet 17 g, 17 g, Oral, Daily, Rustam Drummond MD, 17 g at 04/21/25 0952    senna (SENOKOT) tablet 17.2 mg, 2 tablet, Oral, HS, Rustam Drummond MD, 17.2 mg at 04/20/25 2251    sodium bicarbonate 75 mEq in sodium chloride 0.45 % 1,000 mL infusion, 150 mL/hr, Intravenous, Continuous, Rustam Drummond MD, Last Rate: 150 mL/hr at 04/22/25 0526, 150 mL/hr at 04/22/25 0526    sodium chloride 0.9 % infusion, 20 mL/hr, Intravenous, Once PRN, Rustam Drummond MD, Stopped at 04/17/25 2051    sodium chloride 0.9 % infusion, 20 mL/hr, Intravenous, Once PRN, Rustam Drummond MD      Lab Results: I have reviewed the following results:  Results from last 7 days   Lab Units 04/22/25  0515 04/21/25  0439 04/21/25  0437 04/20/25  0534 04/19/25  1821 04/19/25  0719 04/19/25  0528 04/18/25  0512 04/17/25  0621 04/16/25  0511   WBC Thousand/uL 10.29* 11.00*  --  7.23  --  10.29*  --  10.59* 6.88 11.53*   HEMOGLOBIN g/dL 14.7 15.2  --  13.6  --  13.5  --  13.9 13.7 13.3   HEMATOCRIT % 41.1 41.7  --  37.3  --  36.0*  --  38.8 39.1 36.7   PLATELETS Thousands/uL 154 142*  --  106*  --  87*  --  140* 149 145*   POTASSIUM mmol/L 4.2 3.5  --  3.5 3.9  --  4.9 3.7 4.1 4.0   CHLORIDE mmol/L 96 98  --  95* 92*  --  95* 97 104 107   CO2 mmol/L 35* 32  --  34* 30  --  30 29 26 23   BUN mg/dL 55* 37*  --  20 22  --  19 18 23 17   CREATININE mg/dL 2.77* 1.76*  --  0.93 0.86  --  0.87 0.74 0.76 0.71   CALCIUM mg/dL 9.1 9.2  --  8.8 8.3*  --  8.8 9.1 9.1 8.8   MAGNESIUM mg/dL 2.7 2.6  --  2.2  --   --  2.2 1.9 2.1 2.1   PHOSPHORUS mg/dL 5.5*  --  6.2* 3.9  --   --  3.7 3.1 3.3 3.0   ALBUMIN g/dL  --   --   --   --   --   --   --   --  3.7 3.7       Administrative Statements     Portions of the record may have been created with voice recognition software. Occasional wrong word or \"sound a like\" substitutions " may have occurred due to the inherent limitations of voice recognition software. Read the chart carefully and recognize, using context, where substitutions have occurred.If you have any questions, please contact the dictating provider.

## 2025-04-22 NOTE — PLAN OF CARE
Problem: PHYSICAL THERAPY ADULT  Goal: Performs mobility at highest level of function for planned discharge setting.  See evaluation for individualized goals.  Description: Treatment/Interventions: LE strengthening/ROM, Functional transfer training, Elevations, Therapeutic exercise, Cognitive reorientation, Endurance training, Bed mobility, Equipment eval/education, Patient/family training, Gait training, Spoke to nursing, Spoke to case management, OT, Continued evaluation, Compensatory technique education, Family          See flowsheet documentation for full assessment, interventions and recommendations.  Outcome: Progressing  Note: Pt pleasant and agreeable to participate in PT session. Completes mobility and therapeutic activity as outlined above. Pt able to improve ambulation distance today compared to previous session with mod Ax1 HHA and SBA of 2nd person for safety. Pt requires cues for improving step length. Pt impulsive at times throughout session requires cues to redirect. Pt progressing towards his goals and will continue to benefit from skilled acute PT services to address deficits and promote mobility. Pt left supine in bed with restraints donned, call bell and personal items within reach and all needs met.

## 2025-04-22 NOTE — ASSESSMENT & PLAN NOTE
- UA 2/22/25: Small blood, trace protein, pH 8.0, RBC 20-30, WBC 2-4  - Dupont cath in place - UO: 1.2L  - Creatinine increased to 2.77 this morning from yesterday likely to receiving methotrexate +/- periods of relative hypotension from 4/20.   - Continue to monitor kidney function/UA  - avoid nephrotoxins  - avoid hypotension

## 2025-04-22 NOTE — SPEECH THERAPY NOTE
Speech Language/Pathology    Speech/Language Pathology Progress Note    Patient Name: Alexis Dennison  Today's Date: 4/22/2025     Problem List  Principal Problem:    Brain tumor (HCC)  Active Problems:    Type 2 diabetes mellitus with hyperglycemia, without long-term current use of insulin (HCC)    HTN, goal below 130/80    Mixed hyperlipidemia    Thyroid nodule    Pulmonary nodule    Liver lesion    Constipation    Ambulatory dysfunction    Encephalopathy    Goals of care, counseling/discussion    Palliative care encounter    Primary CNS lymphoma    LETY (acute kidney injury) (HCC)       Past Medical History  Past Medical History:   Diagnosis Date    Colon polyp     Diabetes mellitus (HCC)     GERD (gastroesophageal reflux disease)     Hyperlipidemia     Hypertension     Liver disease     Sleep apnea         Past Surgical History  Past Surgical History:   Procedure Laterality Date    COLONOSCOPY      CYST REMOVAL      back    FL LUMBAR PUNCTURE DIAGNOSTIC  3/31/2025    FL LUMBAR PUNCTURE DIAGNOSTIC  4/7/2025    CT EXC B9 LESION MRGN XCP SK TG T/A/L 0.6-1.0 CM N/A 6/7/2023    Procedure: EXCISION  BIOPSY LESION/MASS ABDOMINAL/CHEST WALL;  Surgeon: Trevor Villavicencio MD;  Location:  MAIN OR;  Service: General    THIRD VENTRICULOSTOMY Left 4/14/2025    Procedure: Left frontal endoscopic biopsy of ventricular mass and placement of EVD;  Surgeon: Paul Causey MD;  Location:  MAIN OR;  Service: Neurosurgery         Subjective:  Patient awake and alert. Family at bedside visiting.     Objective:  The patient is seen for dysphagia therapy at dinner meal. He is on b/l mitts, so SLP feeds patient. He is assessed with level 3 solids, including chopped chicken with chicken noodle soup and is trialed with regular toast. Bite strength is adequate. Mastication is timely and efficient. The patient takes large, consecutive sips of thin liquids via straw. No overt s/s aspiration observed.     Assessment:  The patient tolerated regular  trials and thin liquids well.     Plan/Recommendations:  Recommend diet change to regular. Continue ST to further assess and evaluate speech/language as able.

## 2025-04-23 LAB
ALBUMIN SERPL BCG-MCNC: 3.3 G/DL (ref 3.5–5)
ALP SERPL-CCNC: 41 U/L (ref 34–104)
ALT SERPL W P-5'-P-CCNC: 77 U/L (ref 7–52)
AMORPH URATE CRY URNS QL MICRO: ABNORMAL
ANION GAP SERPL CALCULATED.3IONS-SCNC: 7 MMOL/L (ref 4–13)
AST SERPL W P-5'-P-CCNC: 27 U/L (ref 13–39)
BACTERIA UR QL AUTO: ABNORMAL /HPF
BILIRUB SERPL-MCNC: 1.07 MG/DL (ref 0.2–1)
BILIRUB UR QL STRIP: NEGATIVE
BUN SERPL-MCNC: 63 MG/DL (ref 5–25)
CALCIUM ALBUM COR SERPL-MCNC: 9.5 MG/DL (ref 8.3–10.1)
CALCIUM SERPL-MCNC: 8.9 MG/DL (ref 8.4–10.2)
CAOX CRY URNS QL MICRO: ABNORMAL /HPF
CHLORIDE SERPL-SCNC: 103 MMOL/L (ref 96–108)
CLARITY UR: CLEAR
CO2 SERPL-SCNC: 34 MMOL/L (ref 21–32)
COLOR UR: ABNORMAL
COLOR UR: ABNORMAL
COLOR UR: COLORLESS
COLOR UR: COLORLESS
CREAT SERPL-MCNC: 2.63 MG/DL (ref 0.6–1.3)
CRYSTALS URNS QL MICRO: ABNORMAL /HPF
GFR SERPL CREATININE-BSD FRML MDRD: 24 ML/MIN/1.73SQ M
GLUCOSE SERPL-MCNC: 111 MG/DL (ref 65–140)
GLUCOSE SERPL-MCNC: 139 MG/DL (ref 65–140)
GLUCOSE SERPL-MCNC: 157 MG/DL (ref 65–140)
GLUCOSE SERPL-MCNC: 190 MG/DL (ref 65–140)
GLUCOSE SERPL-MCNC: 206 MG/DL (ref 65–140)
GLUCOSE UR STRIP-MCNC: ABNORMAL MG/DL
GLUCOSE UR STRIP-MCNC: NEGATIVE MG/DL
HGB UR QL STRIP.AUTO: ABNORMAL
KETONES UR STRIP-MCNC: NEGATIVE MG/DL
LDH SERPL-CCNC: 254 U/L (ref 140–271)
LEUKOCYTE ESTERASE UR QL STRIP: NEGATIVE
NITRITE UR QL STRIP: NEGATIVE
NON-SQ EPI CELLS URNS QL MICRO: ABNORMAL /HPF
PH UR STRIP.AUTO: 7.5 [PH]
PH UR STRIP.AUTO: 7.5 [PH]
PH UR STRIP.AUTO: 8 [PH]
PH UR STRIP.AUTO: 8 [PH]
PHOSPHATE SERPL-MCNC: 5.3 MG/DL (ref 2.3–4.1)
POTASSIUM SERPL-SCNC: 4.4 MMOL/L (ref 3.5–5.3)
PROT SERPL-MCNC: 5.5 G/DL (ref 6.4–8.4)
PROT UR STRIP-MCNC: ABNORMAL MG/DL
PROT UR STRIP-MCNC: ABNORMAL MG/DL
PROT UR STRIP-MCNC: NEGATIVE MG/DL
PROT UR STRIP-MCNC: NEGATIVE MG/DL
RBC #/AREA URNS AUTO: ABNORMAL /HPF
RENAL EPI CELLS #/AREA URNS HPF: PRESENT /[HPF]
SODIUM SERPL-SCNC: 144 MMOL/L (ref 135–147)
SP GR UR STRIP.AUTO: 1.01 (ref 1–1.03)
URATE SERPL-MCNC: 4.2 MG/DL (ref 3.5–8.5)
UROBILINOGEN UR STRIP-ACNC: <2 MG/DL
WBC #/AREA URNS AUTO: ABNORMAL /HPF

## 2025-04-23 PROCEDURE — 97530 THERAPEUTIC ACTIVITIES: CPT

## 2025-04-23 PROCEDURE — 97116 GAIT TRAINING THERAPY: CPT

## 2025-04-23 PROCEDURE — 99232 SBSQ HOSP IP/OBS MODERATE 35: CPT | Performed by: INTERNAL MEDICINE

## 2025-04-23 PROCEDURE — 92523 SPEECH SOUND LANG COMPREHEN: CPT

## 2025-04-23 PROCEDURE — 99232 SBSQ HOSP IP/OBS MODERATE 35: CPT

## 2025-04-23 PROCEDURE — 97535 SELF CARE MNGMENT TRAINING: CPT

## 2025-04-23 PROCEDURE — 84550 ASSAY OF BLOOD/URIC ACID: CPT

## 2025-04-23 PROCEDURE — 80204 DRUG ASSAY METHOTREXATE: CPT

## 2025-04-23 PROCEDURE — 99233 SBSQ HOSP IP/OBS HIGH 50: CPT

## 2025-04-23 PROCEDURE — 83615 LACTATE (LD) (LDH) ENZYME: CPT

## 2025-04-23 PROCEDURE — 80053 COMPREHEN METABOLIC PANEL: CPT

## 2025-04-23 PROCEDURE — 84100 ASSAY OF PHOSPHORUS: CPT

## 2025-04-23 PROCEDURE — 81001 URINALYSIS AUTO W/SCOPE: CPT

## 2025-04-23 PROCEDURE — 82948 REAGENT STRIP/BLOOD GLUCOSE: CPT

## 2025-04-23 RX ADMIN — BISACODYL 10 MG: 10 SUPPOSITORY RECTAL at 08:20

## 2025-04-23 RX ADMIN — DEXAMETHASONE SODIUM PHOSPHATE 4 MG: 4 INJECTION INTRA-ARTICULAR; INTRALESIONAL; INTRAMUSCULAR; INTRAVENOUS; SOFT TISSUE at 22:55

## 2025-04-23 RX ADMIN — SODIUM BICARBONATE 125 ML/HR: 84 INJECTION, SOLUTION INTRAVENOUS at 09:46

## 2025-04-23 RX ADMIN — DEXAMETHASONE SODIUM PHOSPHATE 4 MG: 4 INJECTION INTRA-ARTICULAR; INTRALESIONAL; INTRAMUSCULAR; INTRAVENOUS; SOFT TISSUE at 12:24

## 2025-04-23 RX ADMIN — INSULIN GLARGINE 10 UNITS: 100 INJECTION, SOLUTION SUBCUTANEOUS at 21:32

## 2025-04-23 RX ADMIN — LEUCOVORIN CALCIUM 50 MG: 25 TABLET ORAL at 18:11

## 2025-04-23 RX ADMIN — OLANZAPINE 5 MG: 10 INJECTION, POWDER, FOR SOLUTION INTRAMUSCULAR at 00:16

## 2025-04-23 RX ADMIN — CHLORHEXIDINE GLUCONATE 0.12% ORAL RINSE 15 ML: 1.2 LIQUID ORAL at 08:14

## 2025-04-23 RX ADMIN — LEUCOVORIN CALCIUM 50 MG: 25 TABLET ORAL at 06:35

## 2025-04-23 RX ADMIN — LEUCOVORIN CALCIUM 50 MG: 25 TABLET ORAL at 00:16

## 2025-04-23 RX ADMIN — SODIUM BICARBONATE 125 ML/HR: 84 INJECTION, SOLUTION INTRAVENOUS at 19:27

## 2025-04-23 RX ADMIN — SENNOSIDES 17.2 MG: 8.6 TABLET, FILM COATED ORAL at 21:32

## 2025-04-23 RX ADMIN — INSULIN LISPRO 5 UNITS: 100 INJECTION, SOLUTION INTRAVENOUS; SUBCUTANEOUS at 12:25

## 2025-04-23 RX ADMIN — PANTOPRAZOLE SODIUM 40 MG: 40 TABLET, DELAYED RELEASE ORAL at 05:12

## 2025-04-23 RX ADMIN — HEPARIN SODIUM 5000 UNITS: 5000 INJECTION INTRAVENOUS; SUBCUTANEOUS at 21:32

## 2025-04-23 RX ADMIN — Medication 6 MG: at 20:23

## 2025-04-23 RX ADMIN — POLYETHYLENE GLYCOL 3350 17 G: 17 POWDER, FOR SOLUTION ORAL at 08:14

## 2025-04-23 RX ADMIN — INSULIN LISPRO 5 UNITS: 100 INJECTION, SOLUTION INTRAVENOUS; SUBCUTANEOUS at 22:55

## 2025-04-23 RX ADMIN — LEVETIRACETAM 500 MG: 500 TABLET, FILM COATED ORAL at 20:23

## 2025-04-23 RX ADMIN — OLANZAPINE 5 MG: 5 TABLET, FILM COATED ORAL at 21:32

## 2025-04-23 RX ADMIN — DEXAMETHASONE SODIUM PHOSPHATE 4 MG: 4 INJECTION INTRA-ARTICULAR; INTRALESIONAL; INTRAMUSCULAR; INTRAVENOUS; SOFT TISSUE at 17:02

## 2025-04-23 RX ADMIN — LEVETIRACETAM 500 MG: 500 TABLET, FILM COATED ORAL at 08:14

## 2025-04-23 RX ADMIN — HEPARIN SODIUM 5000 UNITS: 5000 INJECTION INTRAVENOUS; SUBCUTANEOUS at 15:37

## 2025-04-23 RX ADMIN — DEXAMETHASONE SODIUM PHOSPHATE 4 MG: 4 INJECTION INTRA-ARTICULAR; INTRALESIONAL; INTRAMUSCULAR; INTRAVENOUS; SOFT TISSUE at 05:12

## 2025-04-23 RX ADMIN — LEUCOVORIN CALCIUM 50 MG: 25 TABLET ORAL at 12:26

## 2025-04-23 RX ADMIN — ATORVASTATIN CALCIUM 20 MG: 20 TABLET, FILM COATED ORAL at 08:14

## 2025-04-23 RX ADMIN — ALLOPURINOL 300 MG: 300 TABLET ORAL at 08:14

## 2025-04-23 RX ADMIN — HEPARIN SODIUM 5000 UNITS: 5000 INJECTION INTRAVENOUS; SUBCUTANEOUS at 05:12

## 2025-04-23 RX ADMIN — CHLORHEXIDINE GLUCONATE 0.12% ORAL RINSE 15 ML: 1.2 LIQUID ORAL at 20:23

## 2025-04-23 RX ADMIN — INSULIN LISPRO 10 UNITS: 100 INJECTION, SOLUTION INTRAVENOUS; SUBCUTANEOUS at 17:02

## 2025-04-23 NOTE — PROGRESS NOTES
Progress Note - PMR   Name: Alexis Dennison 64 y.o. male I MRN: [id number]  Unit/Bed#: [facility] 731-01 I Date of Admission: 3/29/2025   Date of Service: 4/23/2025 I Hospital Day: 25     Assessment & Plan  Brain tumor (HCC)  Presented with progressively worsening confusion after recent diagnosis  MRI 4/11/2025 showed interval enlargement of the dominant left anterior basal ganglionic mass lesion with greater surrounding vasogenic edema and mass effect. 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 Differential considerations include lymphoma, multicentric high-grade glioma, and less likely metastasis. Correlation with CSF cytology is recommended. Small amount of intraventricular hemorrhage. Interval progression of the previously noted neoplastic disease with the dominant mass in the left anterior basal ganglia and numerous growing ependymal and intraventricular nodular lesions.  S/P biopsy and EVD placement with neurosurgery   Preliminary result suggestive of non-Hodgkins lymphoma.   High dose methotrexate every two weeks started on 4/18 with next dose 5/1. Rituximab weekly due 4/25, and leucovorin 50 mg every 6 hours  Continue PT/OT/SLP while on acute care.  The patient has started treatment but has required mitts and posey belt. He has improved in speech and ability to follow directions. He may be a candidate for acute inpatient rehabilitation pending medical stability and facility acceptance as he is receiving chemotherapy.   Type 2 diabetes mellitus with hyperglycemia, without long-term current use of insulin ([facility])  Lab Results   Component Value Date    HGBA1C 6.6 (H) 02/22/2025       Recent Labs     04/22/25  1639 04/22/25  2158 04/23/25  0644 04/23/25  1054   POCGLU 287* 176* 111 157*       Blood Sugar Average: Last 72 hrs:  (P) [id number]    HTN, goal below 130/80    Mixed hyperlipidemia    Thyroid nodule    Pulmonary  nodule  CTA on 3/26 showed non-specific 4 mm RLL pulmonary nodule  3 month follow up recommended   Liver lesion  CTA showed non-specific 12 mm hypodense right hepatic lesion   MRI abdomen showed no suspicious hepatic lesions, demonstrated simple right hepatic lobe cyst  Constipation    Ambulatory dysfunction    Encephalopathy  Likely due to brain mass  Goals of care, counseling/discussion    Palliative care encounter    Primary CNS lymphoma    LETY (acute kidney injury) (HCC)      Subjective   The patient was seen in his room. His speech is improved and more seneschal. He is able to follow some simple commands but is requiring mitts and restraints as well as posey belt.     Chief Complaint: f/u non-traumatic brain injury    Interval: The patient has started treatment with high does methotrexate every two weeks with next dose on 5/1. He is receiving rituximab weekly and leucovorin 50 mg every 6 hours.     Objective :  Temp:  [97.3 °F (36.3 °C)-98.2 °F (36.8 °C)] 98 °F (36.7 °C)  HR:  [46-56] 49  BP: (148-155)/(74-78) 155/74  Resp:  [17-18] 18  SpO2:  [92 %-97 %] 95 %  O2 Device: None (Room air)    Functional Update:  Mobility: minimal assist for bed mobility, moderate assist for ambulation    Transfers: moderate assist x 1 for transfers  ADLs: minimal assist for eating, moderate assist for grooming/UB bathing/dressing, maximal assist for LB bathing/dressing     Physical Exam  Constitutional:       General: He is not in acute distress.     Appearance: He is not toxic-appearing.   HENT:      Head: Normocephalic and atraumatic.   Eyes:      Extraocular Movements: Extraocular movements intact.   Pulmonary:      Effort: Pulmonary effort is normal. No respiratory distress.   Abdominal:      General: There is no distension.   Musculoskeletal:         General: Normal range of motion.      Right lower leg: No edema.      Left lower leg: No edema.   Neurological:      Mental Status: He is alert.      Comments: Oriented to  person and place    Psychiatric:         Cognition and Memory: Cognition is impaired. Memory is impaired.         Scheduled Meds:  Current Facility-Administered Medications   Medication Dose Route Frequency Provider Last Rate    acetaminophen  650 mg Oral Q6H PRN Rustam Drummond MD      allopurinol  300 mg Oral Daily Rustam Drummond MD      alteplase  2 mg Intracatheter Q1MIN PRN Rustam Drummond MD      aluminum-magnesium hydroxide-simethicone  30 mL Oral Q4H PRN Rustam Drummond MD      atorvastatin  20 mg Oral Daily Rustam Drummond MD      bisacodyl  10 mg Rectal Daily Rustam Drummond MD      calcium carbonate  1,000 mg Oral Daily PRN Rustam Drummond MD      chlorhexidine  15 mL Mouth/Throat Q12H [facility] Rustam Drummond MD      [Held by provider] Cholecalciferol  2,000 Units Oral Daily Rustam Drummond MD      dexamethasone  4 mg Intravenous Q6H [facility] Rustam Drummond MD      heparin (porcine)  5,000 Units Subcutaneous Q8H [facility] Rustam Drummond MD      HYDROmorphone  0.2 mg Intravenous Q2H PRN Rustam Drummond MD      insulin glargine  10 Units Subcutaneous HS Rustam Drummond MD      insulin lispro  5-25 Units Subcutaneous TID AC Rustam Drummond MD      insulin lispro  5-25 Units Subcutaneous HS Rustam Drummond MD      leucovorin  50 mg Oral Q6H Rustam Drummond MD      levETIRAcetam  500 mg Oral Q12H [facility] Rustam Drummond MD      melatonin  6 mg Oral HS Rustam Drummond MD      OLANZapine  5 mg Intramuscular Q8H PRN Rustam Drummond MD      OLANZapine  5 mg Oral HS Rylie Stapleton PA-C      ondansetron  4 mg Intravenous Q6H PRN Rustam Drummond MD      oxyCODONE  2.5 mg Oral Q4H PRN Rustam Drummond MD      Or    oxyCODONE  5 mg Oral Q4H PRN Rustam Drummond MD      pantoprazole  40 mg Oral Early Morning Rustam Drummond MD      polyethylene glycol  17  g Oral Daily Rustam Drummond MD      senna  2 tablet Oral HS Rustam Drummond MD      sodium bicarbonate 75 mEq in sodium chloride 0.45 % 1,000 mL infusion  125 mL/hr Intravenous Continuous Jose Mijares  mL/hr (04/23/25 0946)    sodium chloride  20 mL/hr Intravenous Once PRN Rustam Drummond MD Stopped (04/17/25 2051)    sodium chloride  20 mL/hr Intravenous Once PRN Rustam Drummond MD           Lab Results: I have reviewed the following results:  Results from last 7 days   Lab Units 04/22/25  0515 04/21/25  0439 04/20/25  0534   HEMOGLOBIN g/dL 14.7 15.2 13.6   HEMATOCRIT % 41.1 41.7 37.3   WBC Thousand/uL 10.29* 11.00* 7.23   PLATELETS Thousands/uL 154 142* 106*     Results from last 7 days   Lab Units 04/23/25  0625 04/22/25  0515 04/21/25  0439 04/18/25  0512 04/17/25  0621   BUN mg/dL 63* 55* 37*   < > 23   SODIUM mmol/L 144 142 141   < > 136   POTASSIUM mmol/L 4.4 4.2 3.5   < > 4.1   CHLORIDE mmol/L 103 96 98   < > 104   CREATININE mg/dL 2.63* 2.77* 1.76*   < > 0.76   AST U/L 27  --   --   --  11*   ALT U/L 77*  --   --   --  33    < > = values in this interval not displayed.

## 2025-04-23 NOTE — PLAN OF CARE
Problem: PHYSICAL THERAPY ADULT  Goal: Performs mobility at highest level of function for planned discharge setting.  See evaluation for individualized goals.  Description: Treatment/Interventions: LE strengthening/ROM, Functional transfer training, Elevations, Therapeutic exercise, Cognitive reorientation, Endurance training, Bed mobility, Equipment eval/education, Patient/family training, Gait training, Spoke to nursing, Spoke to case management, OT, Continued evaluation, Compensatory technique education, Family          See flowsheet documentation for full assessment, interventions and recommendations.  4/23/2025 1432 by Ellen Joshi, PT  Outcome: Progressing  Note: Pt pleasant and agreeable to participate in PT session. Completes mobility and therapeutic activity as outlined above. Pt conversational throughout session. Pt able to complete transfers with Ax1. Requires min Ax2 ambulating today with cues for safety. At times pt unsteady and requires min-mod A to regain balance. Pt limited by cognition however slowly progressing towards his goals and will continue to benefit from skilled acute PT services to address deficits. Pt left supine in bed with restraints donned, call bell and personal items within reach and all needs met.

## 2025-04-23 NOTE — OCCUPATIONAL THERAPY NOTE
Occupational Therapy Treatment Note      Alexis Juma    4/23/2025    Principal Problem:    Brain tumor (HCC)  Active Problems:    Type 2 diabetes mellitus with hyperglycemia, without long-term current use of insulin (HCC)    HTN, goal below 130/80    Mixed hyperlipidemia    Thyroid nodule    Pulmonary nodule    Liver lesion    Constipation    Ambulatory dysfunction    Encephalopathy    Goals of care, counseling/discussion    Palliative care encounter    Primary CNS lymphoma    LETY (acute kidney injury) (HCC)      Past Medical History:   Diagnosis Date    Colon polyp     Diabetes mellitus (HCC)     GERD (gastroesophageal reflux disease)     Hyperlipidemia     Hypertension     Liver disease     Sleep apnea        Past Surgical History:   Procedure Laterality Date    COLONOSCOPY      CYST REMOVAL      back    FL LUMBAR PUNCTURE DIAGNOSTIC  3/31/2025    FL LUMBAR PUNCTURE DIAGNOSTIC  4/7/2025    TX EXC B9 LESION MRGN XCP SK TG T/A/L 0.6-1.0 CM N/A 6/7/2023    Procedure: EXCISION  BIOPSY LESION/MASS ABDOMINAL/CHEST WALL;  Surgeon: Trevor Villavicencio MD;  Location: [facility];  Service: General    THIRD VENTRICULOSTOMY Left 4/14/2025    Procedure: Left frontal endoscopic biopsy of ventricular mass and placement of EVD;  Surgeon: Paul Causey MD;  Location: [facility];  Service: Neurosurgery         04/23/25 1032   OT Last Visit   OT Visit Date 04/23/25   Note Type   Note Type Treatment   Pain Assessment   Pain Assessment Tool 0-10   Pain Score No Pain  (Non-rated)   Pain Location/Orientation Orientation: Right;Location: Neck  (Pt reports non-rated pain in R side of neck)   Restrictions/Precautions   Weight Bearing Precautions Per Order No   Other Precautions Impulsive;Cognitive;Restraints;Bed Alarm;Chair Alarm;Multiple lines;Telemetry;Fall Risk;Pain  (Posey belt and b/l wrist restraints)   Lifestyle   Autonomy I w/ ADLs, I w/ IADLs, I w/ functional mobility and transfers   Reciprocal Relationships Wife and children    Service to Others Full time employment   Intrinsic Gratification Reading   ADL   Grooming Assistance 5  Supervision/Setup   Grooming Deficit Impulsive;Verbal cueing;Supervision/safety;Increased time to complete;Teeth care;Wash/dry face   Grooming Comments Pt Alcides x 1 to stand at the sink while completing grooming activities. Pt requires maxA VCs to select oral hygiene items out of a bin of self-care items, w/ options of 2 items given at a time. W/ correct items identified, pt S for oral hygiene. Pt set-up A for washing face while seated EOB.   UB Dressing Assistance 4  Minimal Assistance   UB Dressing Deficit Verbal cueing;Supervision/safety;Increased time to complete   UB Dressing Comments Pt Alcides for UB dressing while seated EOB   Functional Standing Tolerance   Time 5 minutes   Activity Oral hygiene while standing at the sink   Comments Pt Alcides x 1 to maintain standing balance and to keep pt from pulling out lines (IV and connell catheter)   Bed Mobility   Supine to Sit 3  Moderate assistance   Additional items Assist x 1;HOB elevated;Increased time required;Impulsive;Verbal cues   Sit to Supine 4  Minimal assistance   Additional items Assist x 1;HOB elevated;Increased time required;Impulsive;Verbal cues   Additional Comments Pt modA for supine > sit and required VCs to remain sitting upright on EOB; pt Alcides for sit > supine   Transfers   Sit to Stand 4  Minimal assistance   Additional items Assist x 1;HOB elevated;Increased time required;Impulsive;Verbal cues   Stand to Sit 4  Minimal assistance   Additional items Assist x 1;Increased time required;HOB elevated;Impulsive;Verbal cues   Additional Comments Pt Alcides x 1 for STS transfers w/ VCs for safety   Functional Mobility   Functional Mobility 4  Minimal assistance   Additional Comments Assist x 2 w/ b/l HHA   Additional items Hand hold assistance   Cognition   Overall Cognitive Status Impaired   Arousal/Participation Alert;Responsive;Cooperative   Attention  Attends with cues to redirect   Orientation Level Oriented to person   Memory Decreased recall of recent events;Decreased short term memory;Decreased recall of biographical information;Decreased recall of precautions;Decreased long term memory   Following Commands Follows one step commands with increased time or repetition   Comments Pt pleasant and cooperative; alert and oriented to person, but unable to correctly answer his birthdate or age; pt continues to demonstrate some expressive aphasia, impulsivity, and decreased safety awareness   Additional Activities   Additional Activities Other (Comment)  (Visual scanning activity)   Additional Activities Comments Pt participated in a visual scanning activity during functional mobility in the hallway. Pt Alcides x 2 w/ b/l HHA for functional mobility w/ modA VCs to find colored cones placed around the hallway. Pt able to identify correct color of 3/7 cones w/o cueing. Pt asked to read 3 room numbers while ambulating in hallway, pt required maxA VC's to correctly identify room numbers   Activity Tolerance   Activity Tolerance Patient tolerated treatment well   Medical Staff Made Aware RN; PT, Ellen   Assessment   Assessment Pt seen this am for OT w/ focus on grooming, UB dressing, standing tolerance, functional mobility, functional transfers, visual scanning, command following, and color/number identification. Pt found supine in bed upon OT entry. Pt A&O x 1 and agreeable to participate in therapy. Pt modA x 1 for bed mobility, Alcides x 1 for STS transfers, and Alcides x 2 w/ b/l HHA for functional mobility. For grooming activity, pt was able to stand at the sink w/ Alcides x 1 to complete oral hygiene. Pt was unable to identify oral hygiene items out of a bin of self-care items and required maxA VC's, and a choice of 2 items, to choose necessary items. Pt required modA VC's for visual scanning activity of finding colored cones placed around the hallway during functional  mobility. Pt was able to identify the correct color of 3/7 cones, unprompted. When asked to read room numbers in the hallway, pt required maxA VC's to identify correct numbers. Pt Alcides for UB dressing and set-up to wash face while seated EOB. At session completion, pt left supine in bed w/ restraints in place and all needs within reach. Wife present at EOS. OT to continue POC.   Plan   Treatment Interventions ADL retraining;Functional transfer training;Visual perceptual retraining;Endurance training;Cognitive reorientation;Patient/family training;Equipment evaluation/education;Compensatory technique education;Activityengagement   Goal Expiration Date 04/29/25   OT Treatment Day 6   OT Frequency 3-5x/wk   Discharge Recommendation   Rehab Resource Intensity Level, OT I (Maximum Resource Intensity)   Additional Comments  The patient's raw score on the AM-PAC Daily Activity Inpatient Short Form is 15. A raw score of less than 19 suggests the patient may benefit from discharge to post-acute rehabilitation services. Please refer to the recommendation of the Occupational Therapist for safe discharge planning.   Additional Comments 2 Pt seen as a co-session due to the patient's co-morbidities, clinically unstable presentation, and present impairments which are a regression from the patient's baseline.   AM-PAC Daily Activity Inpatient   Lower Body Dressing 2   Bathing 2   Toileting 2   Upper Body Dressing 3   Grooming 3   Eating 3   Daily Activity Raw Score 15   Daily Activity Standardized Score (Calc for Raw Score >=11) 34.69   Turning Head Towards Sound 2   Follow Simple Instructions 2   Low Function Daily Activity Raw Score 19   Low Function Daily Activity Standardized Score  31.34   AM-[facility] Applied Cognition Inpatient   Following a Speech/Presentation 1   Understanding Ordinary Conversation 3   Taking Medications 2   Remembering Where Things Are Placed or Put Away 2   Remembering List of 4-5 Errands 1   Taking Care of  Complicated Tasks 1   Applied Cognition Raw Score 10   Applied Cognition Standardized Score 24.98   End of Consult   Education Provided Yes;Family or social support of family present for education by provider   Patient Position at End of Consult Supine;Bed/Chair alarm activated;All needs within reach;Other (comment)  (Restraints in place)   Nurse Communication Nurse aware of consult     Mimi Ureña, OTS

## 2025-04-23 NOTE — PROGRESS NOTES
"Progress Note - Hospitalist   Name: Alexis Dennison 64 y.o. male I MRN: [id number]  Unit/Bed#: [facility] 731-01 I Date of Admission: 3/29/2025   Date of Service: 4/23/2025 I Hospital Day: 25     Assessment & Plan  Brain tumor (HCC)  Patient with development of worsening confusion, recently seen at the [facility] ED 3/24/2025 with CT head at that time with finding of brain lesion for which MRI was advised to exclude cancerous etiology.     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  Lymphoma/leukemia panel nondiagnostic  CT head on 4/3 with interval increase in ventricular caliber concerning for worsening hydrocephalus  No indication for AED per neurosurgery and holding off steroids until Dx achieved  Continue neuroccks   Neurosurgery and oncology recommended repeat high-volume LP as previous was nondiagnostic  Post LP 4/7 which was again undiagnostic.   MRI of the brain from 4/11-\"Interval enlargement of the dominant left anterior basal ganglionic mass lesion with greater surrounding vasogenic edema and mass effect. 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 Differential considerations include lymphoma, multicentric high-grade glioma, and less likely metastasis\"  S/p brain bx 4/14 by neurosurgery   Nondiagnostic LPs  Patient pulled out EVD over the weekend  Discussed with daughter and appears that patient is more " improved at this time  Heme-onc on board  Plan for high-dose methotrexate every 2 weeks last received on 4/18, planned on 5/1.  Rituximab weekly due next week 4/25  Patient needs leucovorin 50 mg every 6 hours, which she has been receiving  Discussed with case management today.  [facility] is unable to accept if patient is ongoing chemo.  We are trying to refer to [facility] for clinical review  \  LETY (acute kidney injury) (HCC)  Lab Results   Component Value Date    CREATININE 2.63 (H) 04/23/2025    CREATININE 2.77 (H) 04/22/2025    CREATININE 1.76 (H) 04/21/2025       Lab Results   Component Value Date    EGFR 24 04/23/2025    EGFR 22 04/22/2025    EGFR 39 04/21/2025   Creatinine continues to uptrend.  Clear in the setting of hypotension episode and methotrexate toxicity  Continue IV fluids per nephrology    Encephalopathy  Worsening, patient is more lethargic today  Due to brain mass as above  Patient with acute onset confusion, forgetting names/places, oriented to self only most of the times  Delirium precautions  Patient has improving encephalopathy according to the daughter.    Type 2 diabetes mellitus with hyperglycemia, without long-term current use of insulin (HCC)  Has been well-controlled as outpatient with hemoglobin A1c of 6.6%  Hold home oral medications, start correctional insulin coverage   Accu-Cheks reviewed and acceptable  Continue hypoglycemia protocol and carb controlled diet  Mixed hyperlipidemia  Continue statin  HTN, goal below 130/80  Continue losartan and hydrochlorothiazide  BP reviewed and acceptable  Liver lesion  MRI obtained: No suspicious hepatic lesions.  There is a simple right hepatic lobe cyst.  Mild diffuse hepatic steatosis.  LFTs stable  Outpatient follow up advised  Thyroid nodule  Incidental findings on CT scan  Nonemergent thyroid ultrasound for follow-up in the outpatient setting  Pulmonary nodule  Imaging with 4 mm right lower lobe pulmonary nodule.  CT chest 3-month  follow-up  Constipation  Continue senna, MiraLAX and suppository  Ambulate patient as able  Ambulatory dysfunction  PT/OT recommending level 2 at discharge  Awaiting medically stability  Ambulate patient as able  Fall precautions  Goals of care, counseling/discussion    Palliative care encounter    Primary CNS lymphoma      VTE Pharmacologic Prophylaxis: VTE Score: 5 High Risk (Score >/= 5) - Pharmacological DVT Prophylaxis Ordered: heparin. Sequential Compression Devices Ordered.    Mobility:   Basic Mobility Inpatient Raw Score: 13  JH-HLM Goal: 4: Move to chair/commode  JH-HLM Achieved: 7: Walk 25 feet or more  JH-HLM Goal NOT achieved. Continue with multidisciplinary rounding and encourage appropriate mobility to improve upon JH-HLM goals.    Patient Centered Rounds: I performed bedside rounds with nursing staff today.   Discussions with Specialists or Other Care Team Provider: Palliative, nephrology    Education and Discussions with Family / Patient: Updated  (daughter) via phone.    Current Length of Stay: 25 day(s)  Current Patient Status: Inpatient   Certification Statement: The patient will continue to require additional inpatient hospital stay due to AMS, [place]  Discharge Plan: Anticipate discharge in 24-48 hrs to rehab facility.    Code Status: Level 1 - Full Code    Subjective   Patient has no complaints at this time    Objective :  Temp:  [97.3 °F (36.3 °C)-98.2 °F (36.8 °C)] 98 °F (36.7 °C)  HR:  [46-56] 49  BP: (148-155)/(74-78) 155/74  Resp:  [17-18] 18  SpO2:  [92 %-97 %] 95 %  O2 Device: None (Room air)    Body mass index is 22.74 kg/m².     Input and Output Summary (last 24 hours):     Intake/Output Summary (Last 24 hours) at 4/23/2025 1410  Last data filed at 4/23/2025 1215  Gross per 24 hour   Intake 230 ml   Output 3000 ml   Net -2770 ml       Physical Exam  Vitals and nursing note reviewed.   Constitutional:       Appearance: He is well-developed and normal weight.   HENT:       Head: Normocephalic and atraumatic.      Nose: Nose normal.      Mouth/Throat:      Mouth: Mucous membranes are moist.   Eyes:      Conjunctiva/sclera: Conjunctivae normal.   Cardiovascular:      Rate and Rhythm: Normal rate and regular rhythm.      Heart sounds: Normal heart sounds. No murmur heard.  Pulmonary:      Effort: Pulmonary effort is normal. No respiratory distress.      Breath sounds: Normal breath sounds.   Abdominal:      General: Abdomen is flat.      Palpations: Abdomen is soft.      Tenderness: There is no abdominal tenderness.   Musculoskeletal:         General: No swelling.      Cervical back: Neck supple.      Right lower leg: No edema.      Left lower leg: No edema.   Skin:     General: Skin is warm and dry.      Capillary Refill: Capillary refill takes less than 2 seconds.   Neurological:      General: No focal deficit present.      Mental Status: He is alert. Mental status is at baseline.      Comments: Today oriented x 3 and was able to name his wife at bedside   Psychiatric:         Mood and Affect: Mood normal.           Lines/Drains:  Lines/Drains/Airways       Active Status       Name Placement date Placement time Site Days    PICC Line 04/16/25 Right Basilic 04/16/25  1526  Basilic  6    External Urinary Catheter Medium 04/20/25  0915  -- 3                    Central Line:  Goal for removal: Will discontinue when hemodynamically stable.               Lab Results: I have reviewed the following results:   Results from last 7 days   Lab Units 04/22/25  0515 04/20/25  0534 04/19/25  0719   WBC Thousand/uL 10.29*   < > 10.29*   HEMOGLOBIN g/dL 14.7   < > 13.5   HEMATOCRIT % 41.1   < > 36.0*   PLATELETS Thousands/uL 154   < > 87*   SEGS PCT %  --   --  89*   LYMPHO PCT % 9*  --  5*   MONO PCT % 0*  --  5   EOS PCT % 0  --  0    < > = values in this interval not displayed.     Results from last 7 days   Lab Units 04/23/25  0625   SODIUM mmol/L 144   POTASSIUM mmol/L 4.4   CHLORIDE mmol/L 103    CO2 mmol/L 34*   BUN mg/dL 63*   CREATININE mg/dL 2.63*   ANION GAP mmol/L 7   CALCIUM mg/dL 8.9   ALBUMIN g/dL 3.3*   TOTAL BILIRUBIN mg/dL 1.07*   ALK PHOS U/L 41   ALT U/L 77*   AST U/L 27   GLUCOSE RANDOM mg/dL 139         Results from last 7 days   Lab Units 04/23/25  1054 04/23/25  0644 04/22/25  2158 04/22/25  1639 04/22/25  1053 04/22/25  0617 04/21/25  2112 04/21/25  1724 04/21/25  1130 04/21/25  0709 04/21/25  0510 04/20/25  2252   POC GLUCOSE mg/dl 157* 111 176* 287* 119 135 198* 200* 224* 130 127 169*               Recent Cultures (last 7 days):           Last 24 Hours Medication List:     Current Facility-Administered Medications:     acetaminophen (TYLENOL) tablet 650 mg, Q6H PRN    allopurinol (ZYLOPRIM) tablet 300 mg, Daily    alteplase (CATHFLO) injection 2 mg, Q1MIN PRN    aluminum-magnesium hydroxide-simethicone (MAALOX) oral suspension 30 mL, Q4H PRN    atorvastatin (LIPITOR) tablet 20 mg, Daily    bisacodyl (DULCOLAX) rectal suppository 10 mg, Daily    calcium carbonate (TUMS) chewable tablet 1,000 mg, Daily PRN    chlorhexidine (PERIDEX) 0.12 % oral rinse 15 mL, Q12H CAIT    [Held by provider] Cholecalciferol (VITAMIN D3) tablet 2,000 Units, Daily    dexamethasone (DECADRON) injection 4 mg, Q6H CAIT    heparin (porcine) subcutaneous injection 5,000 Units, Q8H CAIT    HYDROmorphone HCl (DILAUDID) injection 0.2 mg, Q2H PRN    insulin glargine (LANTUS) subcutaneous injection 10 Units 0.1 mL, HS    insulin lispro (HumALOG/ADMELOG) 100 units/mL subcutaneous injection 5-25 Units, TID AC **AND** Fingerstick Glucose (POCT), TID AC    insulin lispro (HumALOG/ADMELOG) 100 units/mL subcutaneous injection 5-25 Units, HS    leucovorin (WELLCOVORIN) tablet 50 mg, Q6H    levETIRAcetam (KEPPRA) tablet 500 mg, Q12H CAIT    melatonin tablet 6 mg, HS    OLANZapine (ZyPREXA) IM injection 5 mg, Q8H PRN    OLANZapine (ZyPREXA) tablet 5 mg, HS    ondansetron (ZOFRAN) injection 4 mg, Q6H PRN    oxyCODONE (ROXICODONE)  split tablet 2.5 mg, Q4H PRN **OR** oxyCODONE (ROXICODONE) IR tablet 5 mg, Q4H PRN    pantoprazole (PROTONIX) EC tablet 40 mg, Early Morning    polyethylene glycol (MIRALAX) packet 17 g, Daily    senna (SENOKOT) tablet 17.2 mg, HS    sodium bicarbonate 75 mEq in sodium chloride 0.45 % 1,000 mL infusion, Continuous, Last Rate: 125 mL/hr (04/23/25 0946)    sodium chloride 0.9 % infusion, Once PRN, Last Rate: Stopped (04/17/25 2051)    sodium chloride 0.9 % infusion, Once PRN    Administrative Statements   Today, Patient Was Seen By: Rustam Drummond MD  I have spent a total time of 35 minutes in caring for this patient on the day of the visit/encounter including Diagnostic results, Prognosis, Risks and benefits of tx options, Instructions for management, Patient and family education, Importance of tx compliance, Risk factor reductions, Impressions, Counseling / Coordination of care, Documenting in the medical record, Reviewing/placing orders in the medical record (including tests, medications, and/or procedures), Obtaining or reviewing history  , and Communicating with other healthcare professionals .    **Please Note: This note may have been constructed using a voice recognition system.**

## 2025-04-23 NOTE — PLAN OF CARE
Problem: PAIN - ADULT  Goal: Verbalizes/displays adequate comfort level or baseline comfort level  Description: Interventions:- Encourage patient to monitor pain and request assistance- Assess pain using appropriate pain scale- Administer analgesics based on type and severity of pain and evaluate response- Implement non-pharmacological measures as appropriate and evaluate response- Notify physician/advanced practitioner if interventions unsuccessful or patient reports new pain  Outcome: Progressing     Problem: INFECTION - ADULT  Goal: Absence or prevention of progression during hospitalization  Description: INTERVENTIONS:- Assess and monitor for signs and symptoms of infection- Monitor lab/diagnostic results- Monitor all insertion sites, i.e. indwelling lines, tubes, and drains- [place] appropriate cooling/warming therapies per order- Administer medications as ordered- Instruct and encourage patient and family to use good hand hygiene technique- Identify and instruct in appropriate isolation precautions for identified infection/condition  Outcome: Progressing     Problem: SAFETY ADULT  Goal: Patient will remain free of falls  Description: INTERVENTIONS:- Educate patient/family on patient safety including physical limitations- Instruct patient to call for assistance with activity - Consult OT/PT to assist with strengthening/mobility - Keep Call bell within reach- Keep bed low and locked with side rails adjusted as appropriate- Keep care items and personal belongings within reach- Initiate and maintain comfort rounds- Make Fall Risk Sign visible to staff- Offer Toileting every 2 Hours, in advance of need- Initiate/Maintain bed/chair alarm- Obtain necessary fall risk management equipment.- Apply yellow socks and bracelet for high fall risk patients- Consider moving patient to room near nurses station  INTERVENTIONS:- Educate patient/family on patient safety including physical limitations- Instruct patient to call  for assistance with activity - Consult OT/PT to assist with strengthening/mobility - Keep Call bell within reach- Keep bed low and locked with side rails adjusted as appropriate- Keep care items and personal belongings within reach- Initiate and maintain comfort rounds- Make Fall Risk Sign visible to staff- Offer Toileting every 2 Hours, in advance of need- Initiate/Maintain bed alarm- Obtain necessary fall risk management equipment: bracelet, socks, alarms- Apply yellow socks and bracelet for high fall risk patients- Consider moving patient to room near nurses station  Outcome: Progressing  Goal: Maintain or return to baseline ADL function  Description: INTERVENTIONS:-  Assess patient's ability to carry out ADLs; assess patient's baseline for ADL function and identify physical deficits which impact ability to perform ADLs (bathing, care of mouth/teeth, toileting, grooming, dressing, etc.)- Assess/evaluate cause of self-care deficits - Assess range of motion- Assess patient's mobility; develop plan if impaired- Assess patient's need for assistive devices and provide as appropriate- Encourage maximum independence but intervene and supervise when necessary- Involve family in performance of ADLs- Assess for home care needs following discharge - Consider OT consult to assist with ADL evaluation and planning for discharge- Provide patient education as appropriate  Outcome: Progressing  Goal: Maintains/Returns to pre admission functional level  Description: INTERVENTIONS:- Perform AM-PAC 6 Click Basic Mobility/ Daily Activity assessment daily.- Set and communicate daily mobility goal to care team and patient/family/caregiver. - Collaborate with rehabilitation services on mobility goals if consulted- Reposition patient every 2 hours.- Dangle patient 3 times a day- Stand patient 3 times a day- Ambulate patient 3 times a day- Out of bed to chair 3 times a day - Out of bed for meals 3 times a day- Out of bed for toileting-  Record patient progress and toleration of activity level   Outcome: Progressing     Problem: DISCHARGE PLANNING  Goal: Discharge to home or other facility with appropriate resources  Description: INTERVENTIONS:- Identify barriers to discharge w/patient and caregiver- Arrange for needed discharge resources and transportation as appropriate- Identify discharge learning needs (meds, wound care, etc.)- Refer to Case Management Department for coordinating discharge planning if the patient needs post-hospital services based on physician/advanced practitioner order or complex needs related to functional status, cognitive ability, or social support system  Outcome: Progressing     Problem: Knowledge Deficit  Goal: Patient/family/caregiver demonstrates understanding of disease process, treatment plan, medications, and discharge instructions  Description: Complete learning assessment and assess knowledge base.Interventions:- Provide teaching at level of understanding- Provide teaching via preferred learning methods  Outcome: Progressing     Problem: NEUROSENSORY - ADULT  Goal: Achieves stable or improved neurological status  Description: INTERVENTIONS- Monitor and report changes in neurological status- Monitor vital signs such as temperature, blood pressure, glucose, and any other labs ordered - Initiate measures to prevent increased intracranial pressure- Monitor for seizure activity and implement precautions if appropriate    INTERVENTIONS- Monitor and report changes in neurological status- Monitor vital signs such as temperature, blood pressure, glucose, and any other labs ordered - Initiate measures to prevent increased intracranial pressure- Monitor for seizure activity and implement precautions if appropriate    Outcome: Progressing  Goal: Remains free of injury related to seizures activity  Description: INTERVENTIONS- Maintain airway, patient safety  and administer oxygen as ordered- Monitor patient for seizure  activity, document and report duration and description of seizure to physician/advanced practitioner- If seizure occurs,  ensure patient safety during seizure- Reorient patient post seizure- Seizure pads on all 4 side rails- Instruct patient/family to notify RN of any seizure activity including if an aura is experienced- Instruct patient/family to call for assistance with activity based on nursing assessment- Administer anti-seizure medications if ordered  INTERVENTIONS- Maintain airway, patient safety  and administer oxygen as ordered- Monitor patient for seizure activity, document and report duration and description of seizure to physician/advanced practitioner- If seizure occurs,  ensure patient safety during seizure- Reorient patient post seizure- Seizure pads on all 4 side rails- Instruct patient/family to notify RN of any seizure activity including if an aura is experienced- Instruct patient/family to call for assistance with activity based on nursing assessment- Administer anti-seizure medications if ordered  Outcome: Progressing  Goal: Achieves maximal functionality and self care  Description: INTERVENTIONS- Monitor swallowing and airway patency with patient fatigue and changes in neurological status- Encourage and assist patient to increase activity and self care. - Encourage visually impaired, hearing impaired and aphasic patients to use assistive/communication devices  INTERVENTIONS- Monitor swallowing and airway patency with patient fatigue and changes in neurological status- Encourage and assist patient to increase activity and self care. - Encourage visually impaired, hearing impaired and aphasic patients to use assistive/communication devices  Outcome: Progressing     Problem: METABOLIC, FLUID AND ELECTROLYTES - ADULT  Goal: Glucose maintained within target range  Description: INTERVENTIONS:- Monitor Blood Glucose as ordered- Assess for signs and symptoms of hyperglycemia and hypoglycemia- Administer  ordered medications to maintain glucose within target range- Assess nutritional intake and initiate nutrition service referral as needed  INTERVENTIONS:- Monitor Blood Glucose as ordered- Assess for signs and symptoms of hyperglycemia and hypoglycemia- Administer ordered medications to maintain glucose within target range- Assess nutritional intake and initiate nutrition service referral as needed  Outcome: Progressing  Goal: Electrolytes maintained within normal limits  Description: INTERVENTIONS:- Monitor labs and assess patient for signs and symptoms of electrolyte imbalances- Administer electrolyte replacement as ordered- Monitor response to electrolyte replacements, including repeat lab results as appropriate- Instruct patient on fluid and nutrition as appropriate  Outcome: Progressing  Goal: Fluid balance maintained  Description: INTERVENTIONS:- Monitor labs - Monitor I/O and WT- Instruct patient on fluid and nutrition as appropriate- Assess for signs & symptoms of volume excess or deficit  Outcome: Progressing     Problem: Prexisting or High Potential for Compromised Skin Integrity  Goal: Skin integrity is maintained or improved  Description: INTERVENTIONS:- Identify patients at risk for skin breakdown- Assess and monitor skin integrity- Assess and monitor nutrition and hydration status- Monitor labs - Assess for incontinence - Turn and reposition patient- Assist with mobility/ambulation- Relieve pressure over bony prominences- Avoid friction and shearing- Provide appropriate hygiene as needed including keeping skin clean and dry- Evaluate need for skin moisturizer/barrier cream- Collaborate with interdisciplinary team - Patient/family teaching- Consider wound care consult   Outcome: Progressing     Problem: SAFETY,RESTRAINT: NV/NON-SELF DESTRUCTIVE BEHAVIOR  Goal: Remains free of harm/injury (restraint for non violent/non self-detsructive behavior)  Description: INTERVENTIONS:- Instruct patient/family  regarding restraint use - Assess and monitor physiologic and psychological status - Provide interventions and comfort measures to meet assessed patient needs - Identify and implement measures to help patient regain control- Assess readiness for release of restraint   Outcome: Progressing  Goal: Returns to optimal restraint-free functioning  Description: INTERVENTIONS:- Assess the patient's behavior and symptoms that indicate continued need for restraint- Identify and implement measures to help patient regain control- Assess readiness for release of restraint   Outcome: Progressing     Problem: CARDIOVASCULAR - ADULT  Goal: Maintains optimal cardiac output and hemodynamic stability  Description: INTERVENTIONS:- Monitor I/O, vital signs and rhythm- Monitor for S/S and trends of decreased cardiac output- Administer and titrate ordered vasoactive medications to optimize hemodynamic stability- Assess quality of pulses, skin color and temperature- Assess for signs of decreased coronary artery perfusion- Instruct patient to report change in severity of symptoms  Outcome: Progressing  Goal: Absence of cardiac dysrhythmias or at baseline rhythm  Description: INTERVENTIONS:- Continuous cardiac monitoring, vital signs, obtain 12 lead EKG if ordered- Administer antiarrhythmic and heart rate control medications as ordered- Monitor electrolytes and administer replacement therapy as ordered  Outcome: Progressing     Problem: GASTROINTESTINAL - ADULT  Goal: Maintains or returns to baseline bowel function  Description: INTERVENTIONS:- Assess bowel function- Encourage oral fluids to ensure adequate hydration- Administer IV fluids if ordered to ensure adequate hydration- Administer ordered medications as needed- Encourage mobilization and activity- Consider nutritional services referral to assist patient with adequate nutrition and appropriate food choices  Outcome: Progressing  Goal: Maintains adequate nutritional  intake  Description: INTERVENTIONS:- Monitor percentage of each meal consumed- Identify factors contributing to decreased intake, treat as appropriate- Assist with meals as needed- Monitor I&O, weight, and lab values if indicated- Obtain nutrition services referral as needed  Outcome: Progressing     Problem: GENITOURINARY - ADULT  Goal: Maintains or returns to baseline urinary function  Description: INTERVENTIONS:- Assess urinary function- Encourage oral fluids to ensure adequate hydration if ordered- Administer IV fluids as ordered to ensure adequate hydration- Administer ordered medications as needed- Offer frequent toileting- Follow urinary retention protocol if ordered  Outcome: Progressing  Goal: Absence of urinary retention  Description: INTERVENTIONS:- Assess patient’s ability to void and empty bladder- Monitor I/O- Bladder scan as needed- Discuss with physician/AP medications to alleviate retention as needed- Discuss catheterization for long term situations as appropriate  Outcome: Progressing     Problem: SKIN/TISSUE INTEGRITY - ADULT  Goal: Skin Integrity remains intact(Skin Breakdown Prevention)  Description: Assess:-Perform [place] assessment every shift-Clean and moisturize skin every 2-Inspect skin when repositioning, toileting, and assisting with ADLS-Assess under medical devices such as restraints every 2-Assess extremities for adequate circulation and sensation Bed Management:-Have minimal linens on bed & keep smooth, unwrinkled-Change linens as needed when moist or perspiring-Avoid sitting or lying in one position for more than 2 hours while in bed-Keep HOB at 30degrees Toileting:-Offer bedside commode-Assess for incontinence every 2-Use incontinent care products after each incontinent episode such as proper cleaning equipment Activity:-Mobilize patient 3 times a day-Encourage activity and walks on unit-Encourage or provide ROM exercises -Turn and reposition patient every 2 Hours-Use appropriate  equipment to lift or move patient in bed-Instruct/ Assist with weight shifting every 2 hours  when out of bed in chair-Consider limitation of chair time 2 hour intervalsSkin Care:-Avoid use of baby powder, tape, friction and shearing, hot water or constrictive clothing-Relieve pressure over bony prominences using 2-Do not massage red bony areasNext Steps:-Teach patient strategies to minimize risks such as pressure injuries -  Outcome: Progressing  Goal: Incision(s), wounds(s) or drain site(s) healing without S/S of infection  Description: INTERVENTIONS- Assess and document dressing, incision, wound bed, drain sites and surrounding tissue- Provide patient and family education- Perform skin care/dressing changes as needed  Outcome: Progressing     Problem: HEMATOLOGIC - ADULT  Goal: Maintains hematologic stability  Description: INTERVENTIONS- Assess for signs and symptoms of bleeding or hemorrhage- Monitor labs- Administer supportive blood products/factors as ordered and appropriate  Outcome: Progressing     Problem: MUSCULOSKELETAL - ADULT  Goal: Maintain or return mobility to safest level of function  Description: INTERVENTIONS:- Assess patient's ability to carry out ADLs; assess patient's baseline for ADL function and identify physical deficits which impact ability to perform ADLs (bathing, care of mouth/teeth, toileting, grooming, dressing, etc.)- Assess/evaluate cause of self-care deficits - Assess range of motion- Assess patient's mobility- Assess patient's need for assistive devices and provide as appropriate- Encourage maximum independence but intervene and supervise when necessary- Involve family in performance of ADLs- Assess for home care needs following discharge - Consider OT consult to assist with ADL evaluation and planning for discharge- Provide patient education as appropriate  Outcome: Progressing     Problem: Nutrition/Hydration-ADULT  Goal: Nutrient/Hydration intake appropriate for improving,  restoring or maintaining nutritional needs  Description: Monitor and assess patient's nutrition/hydration status for malnutrition. Collaborate with interdisciplinary team and initiate plan and interventions as ordered.  Monitor patient's weight and dietary intake as ordered or per policy. Utilize nutrition screening tool and intervene as necessary. Determine patient's food preferences and provide high-protein, high-caloric foods as appropriate. INTERVENTIONS:- Monitor oral intake, urinary output, labs, and treatment plans- Assess nutrition and hydration status and recommend course of action- Evaluate amount of meals eaten- Assist patient with eating if necessary - Allow adequate time for meals- Recommend/ encourage appropriate diets, oral nutritional supplements, and vitamin/mineral supplements- Order, calculate, and assess calorie counts as needed- Recommend, monitor, and adjust tube feedings and TPN/PPN based on assessed needs- Assess need for intravenous fluids- Provide specific nutrition/hydration education as appropriate- Include patient/family/caregiver in decisions related to nutrition  Outcome: Progressing     Problem: COPING  Goal: Pt/Family able to verbalize concerns and demonstrate effective coping strategies  Description: INTERVENTIONS:- Assist patient/family to identify coping skills, available support systems and cultural and spiritual values- Provide emotional support, including active listening and acknowledgement of concerns of patient and caregivers- Reduce environmental stimuli, as able- Provide patient education- Assess for spiritual pain/suffering and initiate spiritual care, including notification of Pastoral Care or [place] based community as needed- Assess effectiveness of coping strategies  Outcome: Progressing  Goal: Will report anxiety at manageable levels  Description: INTERVENTIONS:- Administer medication as ordered- Teach and encourage coping skills- Provide emotional support- Assess  patient/family for anxiety and ability to cope  Outcome: Progressing     Problem: Potential for Falls  Goal: Patient will remain free of falls  Description: INTERVENTIONS:- Educate patient/family on patient safety including physical limitations- Instruct patient to call for assistance with activity - Consult OT/PT to assist with strengthening/mobility - Keep Call bell within reach- Keep bed low and locked with side rails adjusted as appropriate- Keep care items and personal belongings within reach- Initiate and maintain comfort rounds- Make Fall Risk Sign visible to staff- Offer Toileting every 2 Hours, in advance of need- Initiate/Maintain bed/chair alarm- Obtain necessary fall risk management equipment.- Apply yellow socks and bracelet for high fall risk patients- Consider moving patient to room near nurses station  INTERVENTIONS:- Educate patient/family on patient safety including physical limitations- Instruct patient to call for assistance with activity - Consult OT/PT to assist with strengthening/mobility - Keep Call bell within reach- Keep bed low and locked with side rails adjusted as appropriate- Keep care items and personal belongings within reach- Initiate and maintain comfort rounds- Make Fall Risk Sign visible to staff- Offer Toileting every 2 Hours, in advance of need- Initiate/Maintain bed alarm- Obtain necessary fall risk management equipment: bracelet, socks, alarms- Apply yellow socks and bracelet for high fall risk patients- Consider moving patient to room near nurses station  Outcome: Progressing

## 2025-04-23 NOTE — ASSESSMENT & PLAN NOTE
Lab Results   Component Value Date    HGBA1C 6.6 (H) 02/22/2025       Recent Labs     04/22/25  1639 04/22/25  2158 04/23/25  0644 04/23/25  1054   POCGLU 287* 176* 111 157*       Blood Sugar Average: Last 72 hrs:  (P) [id number]

## 2025-04-23 NOTE — ASSESSMENT & PLAN NOTE
- oncology on board  - keep urine pH >7 due to high doses of methotrexate, current pH 8.0.    - q6hr UA per oncology  - High-dose methotrexate C2 planned for 5/1

## 2025-04-23 NOTE — PLAN OF CARE
Problem: PAIN - ADULT  Goal: Verbalizes/displays adequate comfort level or baseline comfort level  Description: Interventions:- Encourage patient to monitor pain and request assistance- Assess pain using appropriate pain scale- Administer analgesics based on type and severity of pain and evaluate response- Implement non-pharmacological measures as appropriate and evaluate response- Notify physician/advanced practitioner if interventions unsuccessful or patient reports new pain  Outcome: Progressing

## 2025-04-23 NOTE — PROGRESS NOTES
Progress Note - Nephrology   Name: Alexis Dennison 64 y.o. male I MRN: [id number]  [facility] I Date of Admission: 3/29/2025   Date of Service: 4/23/2025 I Hospital Day: 25    Assessment & Plan  LETY (acute kidney injury) (HCC)  - UA 2/22/25: Small blood, trace protein, pH 8.0, RBC 20-30, WBC 2-4  - Dupont cath in place - UO: 1.7L  - Creatinine decreased slightly to 2.63 this morning from yesterday  - Uric acid 4.2  - Continue to monitor kidney function/UA  - avoid nephrotoxins  - avoid hypotension  HTN, goal below 130/80  - BP remains stable  - avoid hypotension  - continuing with IVF  Brain tumor (HCC)  - primary CNS lymphoma as below  - neurosurgery signed off  - Palliative following  Primary CNS lymphoma  - oncology on board  - keep urine pH >7 due to high doses of methotrexate, current pH 8.0.    - q6hr UA per oncology  - High-dose methotrexate C2 planned for 5/1  Type 2 diabetes mellitus with hyperglycemia, without long-term current use of insulin ([facility])  Lab Results   Component Value Date    HGBA1C 6.6 (H) 02/22/2025   - per primary team  Ambulatory dysfunction  - per primary team  Encephalopathy  - per primary team        Subjective   Patient seen and assessed at bedside.  Restraints on.  Patient alert to himself only otherwise confused.  Patient denies chest pain and shortness of breath.  IV fluids infusing.  Indwelling Dupont catheter intact.         Objective :  Temp:  [97.3 °F (36.3 °C)-98.2 °F (36.8 °C)] 98 °F (36.7 °C)  HR:  [46-56] 49  BP: (148-155)/(74-78) 155/74  Resp:  [17-18] 18  SpO2:  [92 %-97 %] 95 %  O2 Device: None (Room air)    Current Weight: Weight - Scale: 71.9 kg (158 lb 8.2 oz)  First Weight: Weight - Scale: 86.2 kg (190 lb)  I/O         04/21 0701  04/22 0700 04/22 0701  04/23 0700 04/23 0701  04/24 0700    P.O. 480      I.V. (mL/kg) 1427.5 (19.9)      Total Intake(mL/kg) 1907.5 (26.5)      Urine (mL/kg/hr) 1285 (0.7) 1750 (1) 650 (2.4)    Total Output 1285 1750 650    Net +622.5  -0647 -800                 Physical Exam  Vitals and nursing note reviewed.   Constitutional:       General: He is not in acute distress.     Appearance: He is ill-appearing.   Cardiovascular:      Rate and Rhythm: Normal rate.      Pulses: Normal pulses.      Heart sounds: No murmur heard.     No gallop.   Pulmonary:      Effort: No respiratory distress.      Breath sounds: Normal breath sounds. No wheezing or rales.   Abdominal:      General: Bowel sounds are normal. There is no distension.      Palpations: Abdomen is soft.      Tenderness: There is no abdominal tenderness.   Genitourinary:     Comments: Indwelling Dupont catheter   Musculoskeletal:      Right lower leg: No edema.      Left lower leg: No edema.   Skin:     General: Skin is warm and dry.      Capillary Refill: Capillary refill takes less than 2 seconds.      Coloration: Skin is pale.   Neurological:      Mental Status: He is alert. Mental status is at baseline. He is disoriented.      Comments: Patient alert only to himself.  Confused to location, year, month.   Psychiatric:         Mood and Affect: Mood normal.         Behavior: Behavior normal.         Medications:    Current Facility-Administered Medications:     acetaminophen (TYLENOL) tablet 650 mg, 650 mg, Oral, Q6H PRN, Rustam Drummond MD    allopurinol (ZYLOPRIM) tablet 300 mg, 300 mg, Oral, Daily, Rustam Drummond MD, 300 mg at 04/23/25 0814    alteplase (CATHFLO) injection 2 mg, 2 mg, Intracatheter, Q1MIN PRN, Rustam Drummond MD    aluminum-magnesium hydroxide-simethicone (MAALOX) oral suspension 30 mL, 30 mL, Oral, Q4H PRN, Rustam Drummond MD    atorvastatin (LIPITOR) tablet 20 mg, 20 mg, Oral, Daily, Rustam Drummond MD, 20 mg at 04/23/25 0814    bisacodyl (DULCOLAX) rectal suppository 10 mg, 10 mg, Rectal, Daily, Rustam Drummond MD, 10 mg at 04/23/25 0820    calcium carbonate (TUMS) chewable tablet 1,000 mg, 1,000 mg, Oral, Daily PRN,  Rustam Drummond MD    chlorhexidine (PERIDEX) 0.12 % oral rinse 15 mL, 15 mL, Mouth/Throat, Q12H CAIT, Rustam Drummond MD, 15 mL at 04/23/25 0814    [Held by provider] Cholecalciferol (VITAMIN D3) tablet 2,000 Units, 2,000 Units, Oral, Daily, Rustam Drummond MD, 2,000 Units at 04/14/25 0810    dexamethasone (DECADRON) injection 4 mg, 4 mg, Intravenous, Q6H CAIT, Rustam Drummond MD, 4 mg at 04/23/25 0512    heparin (porcine) subcutaneous injection 5,000 Units, 5,000 Units, Subcutaneous, Q8H CAIT, Rustam Drummond MD, 5,000 Units at 04/23/25 0512    HYDROmorphone HCl (DILAUDID) injection 0.2 mg, 0.2 mg, Intravenous, Q2H PRN, Rustam Drummond MD    insulin glargine (LANTUS) subcutaneous injection 10 Units 0.1 mL, 10 Units, Subcutaneous, HS, Rustam Drummond MD, 10 Units at 04/22/25 2338    insulin lispro (HumALOG/ADMELOG) 100 units/mL subcutaneous injection 5-25 Units, 5-25 Units, Subcutaneous, TID AC, 15 Units at 04/22/25 1726 **AND** Fingerstick Glucose (POCT), , , TID AC, Rustam Drummond MD    insulin lispro (HumALOG/ADMELOG) 100 units/mL subcutaneous injection 5-25 Units, 5-25 Units, Subcutaneous, HS, Rustam Drummond MD, 5 Units at 04/22/25 2334    leucovorin (WELLCOVORIN) tablet 50 mg, 50 mg, Oral, Q6H, Rustam Drummond MD, 50 mg at 04/23/25 0635    levETIRAcetam (KEPPRA) tablet 500 mg, 500 mg, Oral, Q12H CAIT, Rustam Drummond MD, 500 mg at 04/23/25 0814    melatonin tablet 6 mg, 6 mg, Oral, HS, Rustam Drummond MD, 6 mg at 04/22/25 1936    OLANZapine (ZyPREXA) IM injection 5 mg, 5 mg, Intramuscular, Q8H PRN, Rustam Drummond MD, 5 mg at 04/23/25 0016    OLANZapine (ZyPREXA) tablet 5 mg, 5 mg, Oral, HS, Rylie Stapleton PA-C, 5 mg at 04/22/25 2333    ondansetron (ZOFRAN) injection 4 mg, 4 mg, Intravenous, Q6H PRN, Rustam Drummond MD, 4 mg at 04/14/25 0911    oxyCODONE (ROXICODONE) split tablet 2.5 mg, 2.5 mg, Oral,  Q4H PRN **OR** oxyCODONE (ROXICODONE) IR tablet 5 mg, 5 mg, Oral, Q4H PRN, Rustam Drummond MD    pantoprazole (PROTONIX) EC tablet 40 mg, 40 mg, Oral, Early Morning, Rustam Drummond MD, 40 mg at 04/23/25 0512    polyethylene glycol (MIRALAX) packet 17 g, 17 g, Oral, Daily, Rustam Drummond MD, 17 g at 04/23/25 0814    senna (SENOKOT) tablet 17.2 mg, 2 tablet, Oral, HS, Rustam Drummond MD, 17.2 mg at 04/22/25 2333    sodium bicarbonate 75 mEq in sodium chloride 0.45 % 1,000 mL infusion, 125 mL/hr, Intravenous, Continuous, Jose Mijares MD, Last Rate: 125 mL/hr at 04/23/25 0946, 125 mL/hr at 04/23/25 0946    sodium chloride 0.9 % infusion, 20 mL/hr, Intravenous, Once PRN, Rustam Drummond MD, Stopped at 04/17/25 2051    sodium chloride 0.9 % infusion, 20 mL/hr, Intravenous, Once PRN, Rustam Drummond MD      Lab Results: I have reviewed the following results:  Results from last 7 days   Lab Units 04/23/25  0625 04/22/25  0515 04/21/25  0439 04/21/25  0437 04/20/25  0534 04/19/25  1821 04/19/25  0719 04/19/25  0528 04/18/25  0512 04/17/25  0621   WBC Thousand/uL  --  10.29* 11.00*  --  7.23  --  10.29*  --  10.59* 6.88   HEMOGLOBIN g/dL  --  14.7 15.2  --  13.6  --  13.5  --  13.9 13.7   HEMATOCRIT %  --  41.1 41.7  --  37.3  --  36.0*  --  38.8 39.1   PLATELETS Thousands/uL  --  154 142*  --  106*  --  87*  --  140* 149   POTASSIUM mmol/L 4.4 4.2 3.5  --  3.5 3.9  --  4.9 3.7 4.1   CHLORIDE mmol/L 103 96 98  --  95* 92*  --  95* 97 104   CO2 mmol/L 34* 35* 32  --  34* 30  --  30 29 26   BUN mg/dL 63* 55* 37*  --  20 22  --  19 18 23   CREATININE mg/dL 2.63* 2.77* 1.76*  --  0.93 0.86  --  0.87 0.74 0.76   CALCIUM mg/dL 8.9 9.1 9.2  --  8.8 8.3*  --  8.8 9.1 9.1   MAGNESIUM mg/dL  --  2.7 2.6  --  2.2  --   --  2.2 1.9 2.1   PHOSPHORUS mg/dL 5.3* 5.5*  --  6.2* 3.9  --   --  3.7 3.1 3.3   ALBUMIN g/dL 3.3*  --   --   --   --   --   --   --   --  3.7       Administrative  "Statements     Portions of the record may have been created with voice recognition software. Occasional wrong word or \"sound a like\" substitutions may have occurred due to the inherent limitations of voice recognition software. Read the chart carefully and recognize, using context, where substitutions have occurred.If you have any questions, please contact the dictating provider.  "

## 2025-04-23 NOTE — ASSESSMENT & PLAN NOTE
- UA 2/22/25: Small blood, trace protein, pH 8.0, RBC 20-30, WBC 2-4  - Dupont cath in place - UO: 1.7L  - Creatinine decreased slightly to 2.63 this morning from yesterday  - Uric acid 4.2  - Continue to monitor kidney function/UA  - avoid nephrotoxins  - avoid hypotension

## 2025-04-23 NOTE — OCCUPATIONAL THERAPY NOTE
Pt seen this am for OT w/ focus on grooming, UB dressing, standing tolerance, functional mobility, functional transfers, visual scanning, command following, and color/number identification. Pt found supine in bed upon OT entry. Pt A&O x 1 and agreeable to participate in therapy. Pt modA x 1 for bed mobility, Alcides x 1 for STS transfers, and Alcides x 2 w/ b/l HHA for functional mobility. For grooming activity, pt was able to stand at the sink w/ Alcides x 1 to complete oral hygiene. Pt was unable to identify oral hygiene items out of a bin of self-care items and required maxA VC's, and a choice of 2 items, to choose necessary items. Pt required modA VC's for visual scanning activity of finding colored cones placed around the hallway during functional mobility. Pt was able to identify the correct color of 3/7 cones, unprompted. When asked to read room numbers in the hallway, pt required maxA VC's to identify correct numbers. Pt Alcides for UB dressing and set-up to wash face while seated EOB. At session completion, pt left supine in bed w/ restraints in place and all needs within reach. Wife present at EOS. OT to continue POC.     The patient's raw score on the AM-PAC Daily Activity Inpatient Short Form is 15. A raw score of less than 19 suggests the patient may benefit from discharge to post-acute rehabilitation services. Please refer to the recommendation of the Occupational Therapist for safe discharge planning.

## 2025-04-23 NOTE — CASE MANAGEMENT
Case Management Discharge Planning Note    Patient name Alexis Dennison  Location [facility] 731/[facility] 731-01 MRN [id number]  : 1960 Date 2025       Current Admission Date: 3/29/2025  Current Admission Diagnosis:Brain tumor (HCC)   Patient Active Problem List    Diagnosis Date Noted Date Diagnosed    LETY (acute kidney injury) (HCC) 2025     Goals of care, counseling/discussion 2025     Palliative care encounter 2025     Primary CNS lymphoma 2025     Encephalopathy 2025     Constipation 2025     Ambulatory dysfunction 2025     Thyroid nodule 2025     Pulmonary nodule 2025     Liver lesion 2025     Brain tumor (HCC) 2025     Type 2 diabetes mellitus with hyperglycemia, without long-term current use of insulin (HCC) 2022     HTN, goal below 130/80 2022     Mixed hyperlipidemia 2022     Vitamin D deficiency 2022     NAFLD (nonalcoholic fatty liver disease) 2022       LOS (days): 25  Geometric Mean LOS (GMLOS) (days): 11.1  Days to GMLOS:-13.4     OBJECTIVE:  Risk of Unplanned Readmission Score: 34.16         Current admission status: Inpatient   Preferred Pharmacy:   [facility]pharm[facility] #[id number] - [place] - [address] [address]  [address] [address]  Phone: [phone] Fax: [phone]    [facility] - [place], [place] - [address]  [address]  [facility] [address]  Phone: [phone] Fax: [phone]    Primary Care Provider: PATI Mckeon    Primary Insurance: BLUE CROSS  Secondary Insurance: CAPITAL    DISCHARGE DETAILS:    Discharge planning discussed with:: Pts daughter via TC  Freedom of Choice: Yes  Comments - Freedom of Choice: Discussed FOC    Contacts  Patient Contacts: daughter Elizabeth Chester  Contact Method: Phone  Phone Number: [phone]  Reason/Outcome: Discharge Planning    CM team following for discharge  "coordination. Pt recommended for IP STR level 1. Acute rehab referrals have since been placed. [facility] able to accept pending \"confirmation chemo/radiation can be done after rehab is complete\". Referral to [facility] currently pending clinical review. TC to pts daughter to review acute rehab options, daughter would like to review both acute rehab options with pt's spouse prior to making a final decision.     Outreach to [facility] in order to follow on [facility] inquiry regarding after care plan.     CM team will continue to follow.     Other Referral/Resources/Interventions Provided:  Interventions: Acute Rehab    Treatment Team Recommendation: Acute Rehab  Discharge Destination Plan:: Acute Rehab         "

## 2025-04-23 NOTE — ASSESSMENT & PLAN NOTE
Lab Results   Component Value Date    CREATININE 2.63 (H) 04/23/2025    CREATININE 2.77 (H) 04/22/2025    CREATININE 1.76 (H) 04/21/2025       Lab Results   Component Value Date    EGFR 24 04/23/2025    EGFR 22 04/22/2025    EGFR 39 04/21/2025   Creatinine continues to uptrend.  Clear in the setting of hypotension episode and methotrexate toxicity  Continue IV fluids per nephrology

## 2025-04-23 NOTE — SPEECH THERAPY NOTE
Speech/Language Evaluation      Patient Name: Alexis Dennison    Today's Date: roblem List  Principal Problem:    Brain tumor (HCC)  Active Problems:    Type 2 diabetes mellitus with hyperglycemia, without long-term current use of insulin (HCC)    HTN, goal below 130/80    Mixed hyperlipidemia    Thyroid nodule    Pulmonary nodule    Liver lesion    Constipation    Ambulatory dysfunction    Encephalopathy    Goals of care, counseling/discussion    Palliative care encounter    Primary CNS lymphoma    LETY (acute kidney injury) (HCC)      Past Medical History  Past Medical History:   Diagnosis Date    Colon polyp     Diabetes mellitus (HCC)     GERD (gastroesophageal reflux disease)     Hyperlipidemia     Hypertension     Liver disease     Sleep apnea        Past Surgical History  Past Surgical History:   Procedure Laterality Date    COLONOSCOPY      CYST REMOVAL      back    FL LUMBAR PUNCTURE DIAGNOSTIC  3/31/2025    FL LUMBAR PUNCTURE DIAGNOSTIC  4/7/2025    CO EXC B9 LESION MRGN XCP SK TG T/A/L 0.6-1.0 CM N/A 6/7/2023    Procedure: EXCISION  BIOPSY LESION/MASS ABDOMINAL/CHEST WALL;  Surgeon: Trevor Villavicencio MD;  Location:  MAIN OR;  Service: General    THIRD VENTRICULOSTOMY Left 4/14/2025    Procedure: Left frontal endoscopic biopsy of ventricular mass and placement of EVD;  Surgeon: Paul Causey MD;  Location:  MAIN OR;  Service: Neurosurgery         Summary   The patient presents with confusion and disorientation, with some expressive aphasia and some possible dysarthria. The patient is lethargic. He is agreeable to evaluation, but falls asleep quickly mid session. Speech is hard to understand, but may be due to lethargy.     Recommendation:  Therapy Prognosis: fair  Prognosis considerations: medical status   Treatment Recommended/Frequency: 1-3 x week, as able. Recommend follow up in rehab setting    Patient's goal:   none stated     Long-term goal:  Patient will communicate wants, needs and opinions  "effectively with different conversational partners in a variety of ADLs    Short Term Goals:   Patient will name objects/pictures /people with 85% accuracy given (min/mod/max) cues.    Patient will provide biographical data with increasing frequency and accuracy given min cues.    Patient will name items by category with 85% accuracy. (body parts/grooming items/everyday objects/food/family members) for 80% of trials    Patient will use visual aid to help re-orient to person, place, time and situation with min cues     General Cognitive Status:  Lethargic. Initially has eyes open and looks at SLP, but eventually falls asleep    Auditory Comprehension:  Body part ID: n/a  Left vs Right Body part: n/a  One step commands: n/a  Personal y/n ?'s: impaired  Simple y/n ?'s: n/a  Comprehension of Conversation: difficult to fully assess, but does appear impaired    Reading Comprehension:  Not assessed today     Speech Mechanism Examination:   No weakness or asymmetry observed. Patient also being seen for dysphagia therapy and is on a regular diet with thin liquids     Oral Expression:  Auto Sequences:   Count Forwards: wfl  Days: wfl  Automatic Social Utterances: wfl  Word Repetition: n/a  Phrase Completion: n/a  Confrontation Naming: impaired  Responsive Naming: impaired  Divergent Naming: n/a  Picture Description: n/a  Conversation: difficult to understand patient during conversation. He has some difficulty following conversation and discussing personal information.   Able to make basic needs known: appears to be able to make needs known to nursing staff.     Written Expression:  Not assessed     Motor Speech:  Dysarthria:   Imprecise artic: yes. Speech is difficult to understand. Question if due to lethargy      Orientation:  Person: wf  Place: [facility], [facility]: impaired   City: impaired \"[place]\"   Time of Day: unable to answer   Month: wfl when given 2 choices   GORDO: n/a  Year:  n/a  Reason for " "hospitalization: impaired  Patient reports his age is 32 and that his  is 2012. He states that he has 4 kids \"3 girls and 3 boys\". He continues with difficulty expressing personal information     Cognitive -linguistic skills:  Impaired  Needs further evaluation    Problem solving: impaired. Patient reports he will \"yell for them\" vs call bell use  Organizational tasks: n/a  Sequencing: n/a  Immediate memory: impaired  Delayed memory: impaired  Short term memory: impaired  Long term memory: impaired     Also noted:  Distractable and lethargic   not aware of deficits            "

## 2025-04-23 NOTE — PHYSICAL THERAPY NOTE
PHYSICAL THERAPY NOTE          Patient Name: Alexis Dennison  Today's Date: 4/23/2025 04/23/25 1033   PT Last Visit   PT Visit Date 04/23/25   Note Type   Note Type Treatment   Pain Assessment   Pain Assessment Tool 0-10   Pain Score No Pain   Restrictions/Precautions   Weight Bearing Precautions Per Order No   Other Precautions Impulsive;Cognitive;Chair Alarm;Restraints;Bed Alarm;Multiple lines;Fall Risk  (b/l soft wrist restraints, posey)   General   Chart Reviewed Yes   Response to Previous Treatment Patient with no complaints from previous session.   Family/Caregiver Present Yes  (wife arrived [facility] through session)   Cognition   Overall Cognitive Status Impaired   Arousal/Participation Responsive;Cooperative   Attention Attends with cues to redirect   Orientation Level Oriented to person;Disoriented to place;Disoriented to time;Disoriented to situation   Memory Decreased short term memory;Decreased recall of recent events;Decreased recall of precautions   Following Commands Follows one step commands with increased time or repetition   Comments pt pleasant and cooperative   Subjective   Subjective pt agreeable to mobilize   Bed Mobility   Supine to Sit 3  Moderate assistance   Additional items Assist x 1;HOB elevated;Increased time required;Verbal cues   Sit to Supine 4  Minimal assistance   Additional items Assist x 1;HOB elevated;Increased time required;Verbal cues   Transfers   Sit to Stand 4  Minimal assistance   Additional items Assist x 1;Increased time required;Verbal cues   Stand to Sit 4  Minimal assistance   Additional items Assist x 1;Increased time required;Verbal cues   Additional Comments HHA   Ambulation/Elevation   Gait pattern Improper Weight shift;Decreased foot clearance;Shuffling;Inconsistent maria esther;Short stride;Excessively slow   Gait Assistance 4  Minimal assist   Additional items Assist x 2;Verbal cues    Assistive Device Other (Comment)  (b/l HHA)   Distance 10'+100'+100'   Stair Management Assistance Not tested   Ambulation/Elevation Additional Comments unsteady, at times pt has mild LOB/stumbles requiring min-mod A to correct   Balance   Static Sitting Fair   Dynamic Sitting Fair -   Static Standing Poor +   Dynamic Standing Poor   Ambulatory Poor   Endurance Deficit   Endurance Deficit Yes   Endurance Deficit Description pt limited by cognition and decreased mobility   Activity Tolerance   Activity Tolerance Other (Comment);Patient tolerated treatment well  (cognition)   Medical Staff Made Aware OT Thuy, OT student Mimi   Nurse Made Aware yes-RN cleared   Exercises   Neuro re-ed pt participating in scavenger hunt in hallway finding 6 cones with min Ax2 HHA, verbal cues, performing turns in hallways   Balance training  pt sitting EOB for 6 minutes with S. pt standing at sink for 3 minutes to brush teeth, initially mod A progressing to min A   Assessment   Prognosis Fair   Problem List Decreased strength;Decreased endurance;Decreased mobility;Decreased coordination;Impaired balance;Decreased cognition;Impaired judgement;Decreased safety awareness   Assessment Pt pleasant and agreeable to participate in PT session. Completes mobility and therapeutic activity as outlined above. Pt conversational throughout session. Pt able to complete transfers with Ax1. Requires min Ax2 ambulating today with cues for safety. At times pt unsteady and requires min-mod A to regain balance. Pt limited by cognition however slowly progressing towards his goals and will continue to benefit from skilled acute PT services to address deficits. Pt left supine in bed with restraints donned, call bell and personal items within reach and all needs met.   Barriers to Discharge Inaccessible home environment;Decreased caregiver support   Goals   Patient Goals to walk   STG Expiration Date 04/27/25   PT Treatment Day 6   Plan    Treatment/Interventions Functional transfer training;LE strengthening/ROM;Therapeutic exercise;Endurance training;Cognitive reorientation;Equipment eval/education;Patient/family training;Gait training;Bed mobility;Continued evaluation;Spoke to nursing;OT   Progress Progressing toward goals   PT Frequency 3-5x/wk   Discharge Recommendation   Rehab Resource Intensity Level, PT I (Maximum Resource Intensity)   AM-PAC Basic Mobility Inpatient   Turning in Flat Bed Without Bedrails 3   Lying on Back to Sitting on Edge of Flat Bed Without Bedrails 2   Moving Bed to Chair 2   Standing Up From Chair Using Arms 2   Walk in Room 2   Climb 3-5 Stairs With Railing 2   Basic Mobility Inpatient Raw Score 13   Basic Mobility Standardized Score 33.99   Turning Head Towards Sound 4   Follow Simple Instructions 2   Low Function Basic Mobility Raw Score  19   Low Function Basic Mobility Standardized Score  31.06   [facility] Highest Level Of Mobility   -HL Goal 4: Move to chair/commode   -HLM Achieved 7: Walk 25 feet or more   Education   Education Provided Mobility training   Patient Demonstrates acceptance/verbal understanding   End of Consult   Patient Position at End of Consult Supine;Bed/Chair alarm activated;All needs within reach   Ellen Joshi DPT

## 2025-04-23 NOTE — ASSESSMENT & PLAN NOTE
"Patient with development of worsening confusion, recently seen at the [facility] ED 3/24/2025 with CT head at that time with finding of brain lesion for which MRI was advised to exclude cancerous etiology.     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  Lymphoma/leukemia panel nondiagnostic  CT head on 4/3 with interval increase in ventricular caliber concerning for worsening hydrocephalus  No indication for AED per neurosurgery and holding off steroids until Dx achieved  Continue neurochecks   Neurosurgery and oncology recommended repeat high-volume LP as previous was nondiagnostic  Post LP 4/7 which was again undiagnostic.   MRI of the brain from 4/11-\"Interval enlargement of the dominant left anterior basal ganglionic mass lesion with greater surrounding vasogenic edema and mass effect. 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 Differential considerations include lymphoma, multicentric high-grade glioma, and less likely metastasis\"  S/p brain bx 4/14 by neurosurgery   Nondiagnostic LPs  Patient pulled out EVD over the weekend  Discussed with daughter and appears that patient is more improved at this time  Heme-onc on board  Plan for high-dose methotrexate every 2 weeks last received on 4/18, planned on 5/1.  Rituximab weekly due next week 4/25  Patient needs leucovorin 50 mg every 6 hours, which she has been " receiving  Discussed with case management today.  [facility] is unable to accept if patient is ongoing chemo.  We are trying to refer to [facility] for clinical review  \

## 2025-04-23 NOTE — ASSESSMENT & PLAN NOTE
Presented with progressively worsening confusion after recent diagnosis  MRI 4/11/2025 showed interval enlargement of the dominant left anterior basal ganglionic mass lesion with greater surrounding vasogenic edema and mass effect. 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 Differential considerations include lymphoma, multicentric high-grade glioma, and less likely metastasis. Correlation with CSF cytology is recommended. Small amount of intraventricular hemorrhage. Interval progression of the previously noted neoplastic disease with the dominant mass in the left anterior basal ganglia and numerous growing ependymal and intraventricular nodular lesions.  S/P biopsy and EVD placement with neurosurgery   Preliminary result suggestive of non-Hodgkins lymphoma.   High dose methotrexate every two weeks started on 4/18 with next dose 5/1. Rituximab weekly due 4/25, and leucovorin 50 mg every 6 hours  Continue PT/OT/SLP while on acute care.  The patient has started treatment but has required mitts and posey belt. He has improved in speech and ability to follow directions. He may be a candidate for acute inpatient rehabilitation pending medical stability and facility acceptance as he is receiving chemotherapy.

## 2025-04-23 NOTE — PLAN OF CARE
Problem: OCCUPATIONAL THERAPY ADULT  Goal: Performs self-care activities at highest level of function for planned discharge setting.  See evaluation for individualized goals.  Description: Treatment Interventions: ADL retraining, Functional transfer training, Visual perceptual retraining, Endurance training, Cognitive reorientation, Patient/family training, Equipment evaluation/education, Compensatory technique education, Activityengagement  Equipment Recommended:  (tbd)       See flowsheet documentation for full assessment, interventions and recommendations.   Outcome: Progressing  Note: Limitation: Decreased ADL status, Decreased Safe judgement during ADL, Decreased cognition, Decreased endurance, Decreased self-care trans, Decreased high-level ADLs  Prognosis: Fair  Assessment: Pt seen this am for OT w/ focus on grooming, UB dressing, standing tolerance, functional mobility, functional transfers, visual scanning, command following, and color/number identification. Pt found supine in bed upon OT entry. Pt A&O x 1 and agreeable to participate in therapy. Pt modA x 1 for bed mobility, Alcides x 1 for STS transfers, and Alcides x 2 w/ b/l HHA for functional mobility. For grooming activity, pt was able to stand at the sink w/ Alcides x 1 to complete oral hygiene. Pt was unable to identify oral hygiene items out of a bin of self-care items and required maxA VC's, and a choice of 2 items, to choose necessary items. Pt required modA VC's for visual scanning activity of finding colored cones placed around the hallway during functional mobility. Pt was able to identify the correct color of 3/7 cones, unprompted. When asked to read room numbers in the hallway, pt required maxA VC's to identify correct numbers. Pt Alcides for UB dressing and set-up to wash face while seated EOB. At session completion, pt left supine in bed w/ restraints in place and all needs within reach. Wife present at EOS. OT to continue POC.  Recommendation:  Physiatry Consult  Rehab Resource Intensity Level, OT: I (Maximum Resource Intensity)     Mimi Ureña, OTS

## 2025-04-23 NOTE — RESTORATIVE TECHNICIAN NOTE
Restorative Technician Note      Patient Name: Alexis Dennison     Note Type: Mobility  Patient Position Upon Consult: Supine  Activity Performed: Ambulated; Dangled; Stood  Assistive Device: Other (Comment) (Assist x1-2)  Education Provided: Yes  Patient Position at End of Consult: Supine; All needs within reach; Bed/Chair alarm activated; Other (comment) (Posey and B/L wrist restraints and B/L Mitts on.)    Cande BANEGAS, Restorative Technician,

## 2025-04-24 PROBLEM — E87.3 METABOLIC ALKALOSIS: Status: ACTIVE | Noted: 2025-04-24

## 2025-04-24 LAB
ALBUMIN SERPL BCG-MCNC: 3.3 G/DL (ref 3.5–5)
ALP SERPL-CCNC: 38 U/L (ref 34–104)
ALT SERPL W P-5'-P-CCNC: 72 U/L (ref 7–52)
AMORPH URATE CRY URNS QL MICRO: ABNORMAL
AMORPH URATE CRY URNS QL MICRO: ABNORMAL
ANION GAP SERPL CALCULATED.3IONS-SCNC: 8 MMOL/L (ref 4–13)
AST SERPL W P-5'-P-CCNC: 26 U/L (ref 13–39)
BACTERIA UR QL AUTO: ABNORMAL /HPF
BASOPHILS # BLD AUTO: 0.01 THOUSANDS/ÂΜL (ref 0–0.1)
BASOPHILS NFR BLD AUTO: 0 % (ref 0–1)
BILIRUB SERPL-MCNC: 0.93 MG/DL (ref 0.2–1)
BILIRUB UR QL STRIP: NEGATIVE
BUDDING YEAST: PRESENT
BUN SERPL-MCNC: 69 MG/DL (ref 5–25)
CALCIUM ALBUM COR SERPL-MCNC: 9.8 MG/DL (ref 8.3–10.1)
CALCIUM SERPL-MCNC: 9.2 MG/DL (ref 8.4–10.2)
CHLORIDE SERPL-SCNC: 106 MMOL/L (ref 96–108)
CLARITY UR: ABNORMAL
CO2 SERPL-SCNC: 31 MMOL/L (ref 21–32)
COLOR UR: ABNORMAL
CREAT SERPL-MCNC: 2.1 MG/DL (ref 0.6–1.3)
CREAT UR-MCNC: 42.1 MG/DL
CRYSTALS URNS QL MICRO: ABNORMAL /HPF
EOSINOPHIL # BLD AUTO: 0 THOUSAND/ÂΜL (ref 0–0.61)
EOSINOPHIL NFR BLD AUTO: 0 % (ref 0–6)
ERYTHROCYTE [DISTWIDTH] IN BLOOD BY AUTOMATED COUNT: 11.9 % (ref 11.6–15.1)
GFR SERPL CREATININE-BSD FRML MDRD: 32 ML/MIN/1.73SQ M
GLUCOSE SERPL-MCNC: 151 MG/DL (ref 65–140)
GLUCOSE SERPL-MCNC: 156 MG/DL (ref 65–140)
GLUCOSE SERPL-MCNC: 166 MG/DL (ref 65–140)
GLUCOSE SERPL-MCNC: 197 MG/DL (ref 65–140)
GLUCOSE SERPL-MCNC: 218 MG/DL (ref 65–140)
GLUCOSE UR STRIP-MCNC: ABNORMAL MG/DL
GLUCOSE UR STRIP-MCNC: NEGATIVE MG/DL
GLUCOSE UR STRIP-MCNC: NEGATIVE MG/DL
HCT VFR BLD AUTO: 34.8 % (ref 36.5–49.3)
HGB BLD-MCNC: 11.6 G/DL (ref 12–17)
HGB UR QL STRIP.AUTO: ABNORMAL
IMM GRANULOCYTES # BLD AUTO: 0.04 THOUSAND/UL (ref 0–0.2)
IMM GRANULOCYTES NFR BLD AUTO: 1 % (ref 0–2)
KETONES UR STRIP-MCNC: NEGATIVE MG/DL
LDH SERPL-CCNC: 247 U/L (ref 140–271)
LEUKOCYTE ESTERASE UR QL STRIP: NEGATIVE
LYMPHOCYTES # BLD AUTO: 0.57 THOUSANDS/ÂΜL (ref 0.6–4.47)
LYMPHOCYTES NFR BLD AUTO: 9 % (ref 14–44)
MCH RBC QN AUTO: 30.3 PG (ref 26.8–34.3)
MCHC RBC AUTO-ENTMCNC: 33.3 G/DL (ref 31.4–37.4)
MCV RBC AUTO: 91 FL (ref 82–98)
MICROALBUMIN UR-MCNC: 28 MG/L
MICROALBUMIN/CREAT 24H UR: 67 MG/G CREATININE (ref 0–30)
MONOCYTES # BLD AUTO: 0.07 THOUSAND/ÂΜL (ref 0.17–1.22)
MONOCYTES NFR BLD AUTO: 1 % (ref 4–12)
MTX SERPL-SCNC: 0.16 UMOL/L
MUCOUS THREADS UR QL AUTO: ABNORMAL
NEUTROPHILS # BLD AUTO: 5.64 THOUSANDS/ÂΜL (ref 1.85–7.62)
NEUTS SEG NFR BLD AUTO: 89 % (ref 43–75)
NITRITE UR QL STRIP: NEGATIVE
NON-SQ EPI CELLS URNS QL MICRO: ABNORMAL /HPF
NRBC BLD AUTO-RTO: 0 /100 WBCS
PH UR STRIP.AUTO: 7.5 [PH]
PH UR STRIP.AUTO: 8 [PH]
PH UR STRIP.AUTO: 8 [PH]
PHOSPHATE SERPL-MCNC: 5.1 MG/DL (ref 2.3–4.1)
PLATELET # BLD AUTO: 110 THOUSANDS/UL (ref 149–390)
PMV BLD AUTO: 10.5 FL (ref 8.9–12.7)
POTASSIUM SERPL-SCNC: 4.3 MMOL/L (ref 3.5–5.3)
PROT SERPL-MCNC: 5.1 G/DL (ref 6.4–8.4)
PROT UR STRIP-MCNC: NEGATIVE MG/DL
RBC # BLD AUTO: 3.83 MILLION/UL (ref 3.88–5.62)
RBC #/AREA URNS AUTO: ABNORMAL /HPF
SODIUM SERPL-SCNC: 145 MMOL/L (ref 135–147)
SP GR UR STRIP.AUTO: 1.01 (ref 1–1.03)
URATE SERPL-MCNC: 3.8 MG/DL (ref 3.5–8.5)
UROBILINOGEN UR STRIP-ACNC: <2 MG/DL
WBC # BLD AUTO: 6.33 THOUSAND/UL (ref 4.31–10.16)
WBC #/AREA URNS AUTO: ABNORMAL /HPF

## 2025-04-24 PROCEDURE — 99233 SBSQ HOSP IP/OBS HIGH 50: CPT | Performed by: INTERNAL MEDICINE

## 2025-04-24 PROCEDURE — 81001 URINALYSIS AUTO W/SCOPE: CPT

## 2025-04-24 PROCEDURE — 82043 UR ALBUMIN QUANTITATIVE: CPT | Performed by: STUDENT IN AN ORGANIZED HEALTH CARE EDUCATION/TRAINING PROGRAM

## 2025-04-24 PROCEDURE — 80204 DRUG ASSAY METHOTREXATE: CPT

## 2025-04-24 PROCEDURE — 84550 ASSAY OF BLOOD/URIC ACID: CPT

## 2025-04-24 PROCEDURE — 84100 ASSAY OF PHOSPHORUS: CPT

## 2025-04-24 PROCEDURE — 85025 COMPLETE CBC W/AUTO DIFF WBC: CPT

## 2025-04-24 PROCEDURE — 83615 LACTATE (LD) (LDH) ENZYME: CPT

## 2025-04-24 PROCEDURE — 99233 SBSQ HOSP IP/OBS HIGH 50: CPT | Performed by: PHYSICIAN ASSISTANT

## 2025-04-24 PROCEDURE — 99232 SBSQ HOSP IP/OBS MODERATE 35: CPT

## 2025-04-24 PROCEDURE — 82948 REAGENT STRIP/BLOOD GLUCOSE: CPT

## 2025-04-24 PROCEDURE — 80053 COMPREHEN METABOLIC PANEL: CPT

## 2025-04-24 PROCEDURE — 99232 SBSQ HOSP IP/OBS MODERATE 35: CPT | Performed by: STUDENT IN AN ORGANIZED HEALTH CARE EDUCATION/TRAINING PROGRAM

## 2025-04-24 PROCEDURE — 82570 ASSAY OF URINE CREATININE: CPT | Performed by: STUDENT IN AN ORGANIZED HEALTH CARE EDUCATION/TRAINING PROGRAM

## 2025-04-24 RX ORDER — SODIUM CHLORIDE 9 MG/ML
20 INJECTION, SOLUTION INTRAVENOUS ONCE AS NEEDED
Status: DISCONTINUED | OUTPATIENT
Start: 2025-04-24 | End: 2025-05-01

## 2025-04-24 RX ORDER — WATER 10 ML/10ML
INJECTION INTRAMUSCULAR; INTRAVENOUS; SUBCUTANEOUS
Status: COMPLETED
Start: 2025-04-24 | End: 2025-04-24

## 2025-04-24 RX ORDER — OLANZAPINE 10 MG/1
10 TABLET, FILM COATED ORAL
Status: DISCONTINUED | OUTPATIENT
Start: 2025-04-24 | End: 2025-04-29

## 2025-04-24 RX ORDER — SODIUM CHLORIDE, SODIUM GLUCONATE, SODIUM ACETATE, POTASSIUM CHLORIDE, MAGNESIUM CHLORIDE, SODIUM PHOSPHATE, DIBASIC, AND POTASSIUM PHOSPHATE .53; .5; .37; .037; .03; .012; .00082 G/100ML; G/100ML; G/100ML; G/100ML; G/100ML; G/100ML; G/100ML
100 INJECTION, SOLUTION INTRAVENOUS CONTINUOUS
Status: DISCONTINUED | OUTPATIENT
Start: 2025-04-24 | End: 2025-04-24

## 2025-04-24 RX ORDER — ACETAMINOPHEN 325 MG/1
650 TABLET ORAL ONCE
Status: COMPLETED | OUTPATIENT
Start: 2025-04-24 | End: 2025-04-24

## 2025-04-24 RX ORDER — OLANZAPINE 2.5 MG/1
2.5 TABLET, FILM COATED ORAL EVERY MORNING
Status: DISCONTINUED | OUTPATIENT
Start: 2025-04-24 | End: 2025-04-27

## 2025-04-24 RX ORDER — DIPHENHYDRAMINE HYDROCHLORIDE 50 MG/ML
25 INJECTION, SOLUTION INTRAMUSCULAR; INTRAVENOUS ONCE
Status: COMPLETED | OUTPATIENT
Start: 2025-04-24 | End: 2025-04-24

## 2025-04-24 RX ADMIN — SODIUM BICARBONATE 125 ML/HR: 84 INJECTION, SOLUTION INTRAVENOUS at 11:08

## 2025-04-24 RX ADMIN — INSULIN GLARGINE 10 UNITS: 100 INJECTION, SOLUTION SUBCUTANEOUS at 21:17

## 2025-04-24 RX ADMIN — ACETAMINOPHEN 650 MG: 325 TABLET, FILM COATED ORAL at 13:07

## 2025-04-24 RX ADMIN — LEUCOVORIN CALCIUM 50 MG: 25 TABLET ORAL at 11:49

## 2025-04-24 RX ADMIN — BISACODYL 10 MG: 10 SUPPOSITORY RECTAL at 08:36

## 2025-04-24 RX ADMIN — HEPARIN SODIUM 5000 UNITS: 5000 INJECTION INTRAVENOUS; SUBCUTANEOUS at 21:17

## 2025-04-24 RX ADMIN — RITUXIMAB 700 MG: 10 INJECTION, SOLUTION INTRAVENOUS at 14:56

## 2025-04-24 RX ADMIN — WATER 10 ML: 1 INJECTION INTRAMUSCULAR; INTRAVENOUS; SUBCUTANEOUS at 17:49

## 2025-04-24 RX ADMIN — HEPARIN SODIUM 5000 UNITS: 5000 INJECTION INTRAVENOUS; SUBCUTANEOUS at 13:08

## 2025-04-24 RX ADMIN — ALLOPURINOL 300 MG: 300 TABLET ORAL at 08:35

## 2025-04-24 RX ADMIN — LEUCOVORIN CALCIUM 50 MG: 25 TABLET ORAL at 17:45

## 2025-04-24 RX ADMIN — LEUCOVORIN CALCIUM 50 MG: 25 TABLET ORAL at 06:42

## 2025-04-24 RX ADMIN — LEUCOVORIN CALCIUM 50 MG: 25 TABLET ORAL at 23:48

## 2025-04-24 RX ADMIN — LEVETIRACETAM 500 MG: 500 TABLET, FILM COATED ORAL at 21:17

## 2025-04-24 RX ADMIN — POLYETHYLENE GLYCOL 3350 17 G: 17 POWDER, FOR SOLUTION ORAL at 08:35

## 2025-04-24 RX ADMIN — CHLORHEXIDINE GLUCONATE 0.12% ORAL RINSE 15 ML: 1.2 LIQUID ORAL at 21:17

## 2025-04-24 RX ADMIN — Medication 6 MG: at 21:22

## 2025-04-24 RX ADMIN — DEXAMETHASONE SODIUM PHOSPHATE 4 MG: 4 INJECTION INTRA-ARTICULAR; INTRALESIONAL; INTRAMUSCULAR; INTRAVENOUS; SOFT TISSUE at 11:08

## 2025-04-24 RX ADMIN — DIPHENHYDRAMINE HYDROCHLORIDE 25 MG: 50 INJECTION, SOLUTION INTRAMUSCULAR; INTRAVENOUS at 13:08

## 2025-04-24 RX ADMIN — ATORVASTATIN CALCIUM 20 MG: 20 TABLET, FILM COATED ORAL at 08:35

## 2025-04-24 RX ADMIN — WATER 10 ML: 1 INJECTION INTRAMUSCULAR; INTRAVENOUS; SUBCUTANEOUS at 03:39

## 2025-04-24 RX ADMIN — INSULIN LISPRO 5 UNITS: 100 INJECTION, SOLUTION INTRAVENOUS; SUBCUTANEOUS at 11:28

## 2025-04-24 RX ADMIN — DEXAMETHASONE SODIUM PHOSPHATE 4 MG: 4 INJECTION INTRA-ARTICULAR; INTRALESIONAL; INTRAMUSCULAR; INTRAVENOUS; SOFT TISSUE at 23:48

## 2025-04-24 RX ADMIN — SENNOSIDES 17.2 MG: 8.6 TABLET, FILM COATED ORAL at 21:17

## 2025-04-24 RX ADMIN — HEPARIN SODIUM 5000 UNITS: 5000 INJECTION INTRAVENOUS; SUBCUTANEOUS at 05:19

## 2025-04-24 RX ADMIN — CHLORHEXIDINE GLUCONATE 0.12% ORAL RINSE 15 ML: 1.2 LIQUID ORAL at 08:35

## 2025-04-24 RX ADMIN — LEUCOVORIN CALCIUM 50 MG: 25 TABLET ORAL at 01:32

## 2025-04-24 RX ADMIN — LEVETIRACETAM 500 MG: 500 TABLET, FILM COATED ORAL at 08:35

## 2025-04-24 RX ADMIN — OLANZAPINE 5 MG: 10 INJECTION, POWDER, FOR SOLUTION INTRAMUSCULAR at 17:49

## 2025-04-24 RX ADMIN — DEXAMETHASONE SODIUM PHOSPHATE 4 MG: 4 INJECTION INTRA-ARTICULAR; INTRALESIONAL; INTRAMUSCULAR; INTRAVENOUS; SOFT TISSUE at 05:17

## 2025-04-24 RX ADMIN — INSULIN LISPRO 5 UNITS: 100 INJECTION, SOLUTION INTRAVENOUS; SUBCUTANEOUS at 08:35

## 2025-04-24 RX ADMIN — OLANZAPINE 2.5 MG: 2.5 TABLET, FILM COATED ORAL at 11:49

## 2025-04-24 RX ADMIN — DEXAMETHASONE SODIUM PHOSPHATE 4 MG: 4 INJECTION INTRA-ARTICULAR; INTRALESIONAL; INTRAMUSCULAR; INTRAVENOUS; SOFT TISSUE at 17:45

## 2025-04-24 RX ADMIN — INSULIN LISPRO 10 UNITS: 100 INJECTION, SOLUTION INTRAVENOUS; SUBCUTANEOUS at 21:18

## 2025-04-24 RX ADMIN — OLANZAPINE 5 MG: 10 INJECTION, POWDER, FOR SOLUTION INTRAMUSCULAR at 03:36

## 2025-04-24 RX ADMIN — PANTOPRAZOLE SODIUM 40 MG: 40 TABLET, DELAYED RELEASE ORAL at 05:20

## 2025-04-24 RX ADMIN — SODIUM BICARBONATE 100 ML/HR: 84 INJECTION, SOLUTION INTRAVENOUS at 14:56

## 2025-04-24 RX ADMIN — OLANZAPINE 10 MG: 10 TABLET, FILM COATED ORAL at 21:17

## 2025-04-24 RX ADMIN — INSULIN LISPRO 5 UNITS: 100 INJECTION, SOLUTION INTRAVENOUS; SUBCUTANEOUS at 17:21

## 2025-04-24 RX ADMIN — SODIUM BICARBONATE 125 ML/HR: 84 INJECTION, SOLUTION INTRAVENOUS at 03:42

## 2025-04-24 NOTE — PROGRESS NOTES
"Discussed with progress Note - Hospitalist   Name: Alexis Dennison 64 y.o. male I MRN: [id number]  Unit/Bed#: [facility] 904-01 I Date of Admission: 3/29/2025   Date of Service: 4/24/2025 I Hospital Day: 26     Assessment & Plan  Brain tumor (HCC)  Patient with development of worsening confusion, recently seen at the [facility] ED 3/24/2025 with CT head at that time with finding of brain lesion for which MRI was advised to exclude cancerous etiology.     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  Lymphoma/leukemia panel nondiagnostic  CT head on 4/3 with interval increase in ventricular caliber concerning for worsening hydrocephalus  No indication for AED per neurosurgery and holding off steroids until Dx achieved  Continue neurochecks   Neurosurgery and oncology recommended repeat high-volume LP as previous was nondiagnostic  Post LP 4/7 which was again undiagnostic.   MRI of the brain from 4/11-\"Interval enlargement of the dominant left anterior basal ganglionic mass lesion with greater surrounding vasogenic edema and mass effect. 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 Differential considerations include lymphoma, multicentric high-grade glioma, and less likely metastasis\"  S/p brain bx 4/14 by neurosurgery   Nondiagnostic LPs  Patient pulled out EVD over the weekend  Discussed with hematology oncology on plan for " Rituxan every week.  Will need to get the rehab facility does get accommodate for this in the outpatient setting.  Will need inpatient methotrexate every 2 or 4 weeks. Still finalizing treatment plan  Heme-onc on board  Plan for high-dose methotrexate every 2 weeks last received on 4/18, planned on 5/1.  Rituximab weekly due next week 4/25  Patient needs leucovorin 50 mg every 6 hours, which she has been receiving  Discussed with case management today.  [facility] is unable to accept if patient is ongoing chemo.  We are trying to refer to [facility] for clinical review  \  LETY (acute kidney injury) (HCC)  Lab Results   Component Value Date    CREATININE 2.10 (H) 04/24/2025    CREATININE 2.63 (H) 04/23/2025    CREATININE 2.77 (H) 04/22/2025       Lab Results   Component Value Date    EGFR 32 04/24/2025    EGFR 24 04/23/2025    EGFR 22 04/22/2025   Creatinine continues to uptrend.  Clear in the setting of hypotension episode and methotrexate toxicity  Continue IV fluids per nephrology    Encephalopathy  Worsening, patient is more lethargic today  Due to brain mass as above  Patient with acute onset confusion, forgetting names/places, oriented to self only most of the times  Delirium precautions  Patient has improving encephalopathy according to the daughter.    Type 2 diabetes mellitus with hyperglycemia, without long-term current use of insulin (HCC)  Has been well-controlled as outpatient with hemoglobin A1c of 6.6%  Hold home oral medications, start correctional insulin coverage   Accu-Cheks reviewed and acceptable  Continue hypoglycemia protocol and carb controlled diet  Mixed hyperlipidemia  Continue statin  HTN, goal below 130/80  Continue losartan and hydrochlorothiazide  BP reviewed and acceptable  Liver lesion  MRI obtained: No suspicious hepatic lesions.  There is a simple right hepatic lobe cyst.  Mild diffuse hepatic steatosis.  LFTs stable  Outpatient follow up advised  Thyroid nodule  Incidental findings on CT  scan  Nonemergent thyroid ultrasound for follow-up in the outpatient setting  Pulmonary nodule  Imaging with 4 mm right lower lobe pulmonary nodule.  CT chest 3-month follow-up  Constipation  Continue senna, MiraLAX and suppository  Ambulate patient as able  Ambulatory dysfunction  PT/OT recommending level 2 at discharge  Awaiting medically stability  Ambulate patient as able  Fall precautions  Goals of care, counseling/discussion    Palliative care encounter    Primary CNS lymphoma    Metabolic alkalosis      VTE Pharmacologic Prophylaxis: VTE Score: 5 High Risk (Score >/= 5) - Pharmacological DVT Prophylaxis Ordered: heparin. Sequential Compression Devices Ordered.    Mobility:   Basic Mobility Inpatient Raw Score: 11  JH-HLM Goal: 4: Move to chair/commode  JH-HLM Achieved: 3: Sit at edge of bed  JH-HLM Goal NOT achieved. Continue with multidisciplinary rounding and encourage appropriate mobility to improve upon JH-HLM goals.    Patient Centered Rounds: I performed bedside rounds with nursing staff today.   Discussions with Specialists or Other Care Team Provider: Palliative, nephrology    Education and Discussions with Family / Patient: Updated  (daughter) via phone.    Current Length of Stay: 26 day(s)  Current Patient Status: Inpatient   Certification Statement: The patient will continue to require additional inpatient hospital stay due to AMS, [place]  Discharge Plan: Anticipate discharge in 24-48 hrs to rehab facility.    Code Status: Level 1 - Full Code    Subjective   Patient has no complaints at this time    Objective :  Temp:  [97.4 °F (36.3 °C)-97.8 °F (36.6 °C)] 97.4 °F (36.3 °C)  HR:  [43-61] 49  BP: (142-154)/(70-79) 154/70  Resp:  [12-18] 12  SpO2:  [91 %-96 %] 93 %  O2 Device: None (Room air)    Body mass index is 24.67 kg/m².     Input and Output Summary (last 24 hours):     Intake/Output Summary (Last 24 hours) at 4/24/2025 1417  Last data filed at 4/24/2025 1043  Gross per 24 hour    Intake 360 ml   Output 2200 ml   Net -1840 ml       Physical Exam  Vitals and nursing note reviewed.   Constitutional:       Appearance: He is well-developed and normal weight.   HENT:      Head: Normocephalic and atraumatic.      Nose: Nose normal.      Mouth/Throat:      Mouth: Mucous membranes are moist.   Eyes:      Conjunctiva/sclera: Conjunctivae normal.   Cardiovascular:      Rate and Rhythm: Normal rate and regular rhythm.      Heart sounds: Normal heart sounds. No murmur heard.  Pulmonary:      Effort: Pulmonary effort is normal. No respiratory distress.      Breath sounds: Normal breath sounds.   Abdominal:      General: Abdomen is flat.      Palpations: Abdomen is soft.      Tenderness: There is no abdominal tenderness.   Musculoskeletal:         General: No swelling.      Cervical back: Neck supple.      Right lower leg: No edema.      Left lower leg: No edema.   Skin:     General: Skin is warm and dry.      Capillary Refill: Capillary refill takes less than 2 seconds.   Neurological:      General: No focal deficit present.      Mental Status: He is alert. Mental status is at baseline.      Comments: Today oriented x 3 and was able to name his wife at bedside   Psychiatric:         Mood and Affect: Mood normal.           Lines/Drains:  Lines/Drains/Airways       Active Status       Name Placement date Placement time Site Days    PICC Line 04/16/25 Right Basilic 04/16/25  1526  Basilic  7    External Urinary Catheter Medium 04/24/25  1000  -- less than 1                    Central Line:  Goal for removal: Will discontinue when hemodynamically stable.               Lab Results: I have reviewed the following results:   Results from last 7 days   Lab Units 04/24/25  0515   WBC Thousand/uL 6.33   HEMOGLOBIN g/dL 11.6*   HEMATOCRIT % 34.8*   PLATELETS Thousands/uL 110*   SEGS PCT % 89*   LYMPHO PCT % 9*   MONO PCT % 1*   EOS PCT % 0     Results from last 7 days   Lab Units 04/24/25  0515   SODIUM mmol/L 145    POTASSIUM mmol/L 4.3   CHLORIDE mmol/L 106   CO2 mmol/L 31   BUN mg/dL 69*   CREATININE mg/dL 2.10*   ANION GAP mmol/L 8   CALCIUM mg/dL 9.2   ALBUMIN g/dL 3.3*   TOTAL BILIRUBIN mg/dL 0.93   ALK PHOS U/L 38   ALT U/L 72*   AST U/L 26   GLUCOSE RANDOM mg/dL 151*         Results from last 7 days   Lab Units 04/24/25  1118 04/24/25  0721 04/23/25  2036 04/23/25  1610 04/23/25  1054 04/23/25  0644 04/22/25  2158 04/22/25  1639 04/22/25  1053 04/22/25  0617 04/21/25  2112 04/21/25  1724   POC GLUCOSE mg/dl 197* 156* 190* 206* 157* 111 176* 287* 119 135 198* 200*               Recent Cultures (last 7 days):           Last 24 Hours Medication List:     Current Facility-Administered Medications:     acetaminophen (TYLENOL) tablet 650 mg, Q6H PRN    allopurinol (ZYLOPRIM) tablet 300 mg, Daily    alteplase (CATHFLO) injection 2 mg, Q1MIN PRN    alteplase (CATHFLO) injection 2 mg, Q1MIN PRN    aluminum-magnesium hydroxide-simethicone (MAALOX) oral suspension 30 mL, Q4H PRN    atorvastatin (LIPITOR) tablet 20 mg, Daily    bisacodyl (DULCOLAX) rectal suppository 10 mg, Daily    calcium carbonate (TUMS) chewable tablet 1,000 mg, Daily PRN    chlorhexidine (PERIDEX) 0.12 % oral rinse 15 mL, Q12H CAIT    [Held by provider] Cholecalciferol (VITAMIN D3) tablet 2,000 Units, Daily    dexamethasone (DECADRON) injection 4 mg, Q6H CAIT    heparin (porcine) subcutaneous injection 5,000 Units, Q8H CAIT    HYDROmorphone HCl (DILAUDID) injection 0.2 mg, Q2H PRN    insulin glargine (LANTUS) subcutaneous injection 10 Units 0.1 mL, HS    insulin lispro (HumALOG/ADMELOG) 100 units/mL subcutaneous injection 5-25 Units, TID AC **AND** Fingerstick Glucose (POCT), TID AC    insulin lispro (HumALOG/ADMELOG) 100 units/mL subcutaneous injection 5-25 Units, HS    leucovorin (WELLCOVORIN) tablet 50 mg, Q6H    levETIRAcetam (KEPPRA) tablet 500 mg, Q12H CAIT    melatonin tablet 6 mg, HS    OLANZapine (ZyPREXA) IM injection 5 mg, Q8H PRN    OLANZapine  (ZyPREXA) tablet 10 mg, HS    OLANZapine (ZyPREXA) tablet 2.5 mg, QAM    ondansetron (ZOFRAN) injection 4 mg, Q6H PRN    oxyCODONE (ROXICODONE) split tablet 2.5 mg, Q4H PRN **OR** oxyCODONE (ROXICODONE) IR tablet 5 mg, Q4H PRN    pantoprazole (PROTONIX) EC tablet 40 mg, Early Morning    polyethylene glycol (MIRALAX) packet 17 g, Daily    riTUXimab (RITUXAN) 700 mg in sodium chloride 0.9 % 280 mL first titrated chemo infusion, Once    senna (SENOKOT) tablet 17.2 mg, HS    sodium bicarbonate 100 mEq in dextrose 5 % 1,000 mL infusion, Continuous    sodium chloride 0.9 % infusion, Once PRN, Last Rate: Stopped (04/17/25 2051)    sodium chloride 0.9 % infusion, Once PRN    sodium chloride 0.9 % infusion, Once PRN    Administrative Statements   Today, Patient Was Seen By: Rustam Drummond MD  I have spent a total time of 35 minutes in caring for this patient on the day of the visit/encounter including Diagnostic results, Prognosis, Risks and benefits of tx options, Instructions for management, Patient and family education, Importance of tx compliance, Risk factor reductions, Impressions, Counseling / Coordination of care, Documenting in the medical record, Reviewing/placing orders in the medical record (including tests, medications, and/or procedures), Obtaining or reviewing history  , and Communicating with other healthcare professionals .    **Please Note: This note may have been constructed using a voice recognition system.**

## 2025-04-24 NOTE — ASSESSMENT & PLAN NOTE
"Patient with development of worsening confusion, recently seen at the [facility] ED 3/24/2025 with CT head at that time with finding of brain lesion for which MRI was advised to exclude cancerous etiology.     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  Lymphoma/leukemia panel nondiagnostic  CT head on 4/3 with interval increase in ventricular caliber concerning for worsening hydrocephalus  No indication for AED per neurosurgery and holding off steroids until Dx achieved  Continue neurochecks   Neurosurgery and oncology recommended repeat high-volume LP as previous was nondiagnostic  Post LP 4/7 which was again undiagnostic.   MRI of the brain from 4/11-\"Interval enlargement of the dominant left anterior basal ganglionic mass lesion with greater surrounding vasogenic edema and mass effect. 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 Differential considerations include lymphoma, multicentric high-grade glioma, and less likely metastasis\"  S/p brain bx 4/14 by neurosurgery   Nondiagnostic LPs  Patient pulled out EVD over the weekend  Discussed with daughter and appears that patient is more improved at this time  Heme-onc on board  Plan for high-dose methotrexate every 2 weeks last received on 4/18, planned on 5/1.  Plan for rituximab today.  Patient needs leucovorin 50 mg every 6 hours, which she has been " receiving  Unfortunately patient is acutely altered due to the Benadryl that we gave before the Rituxan.  Was confused and was trying to pull out IVs  Needed to continue the restraints  Needs continuous one-to-one observation.

## 2025-04-24 NOTE — ASSESSMENT & PLAN NOTE
serum HCO3 31mmol/L  Decrease sodium bicarbonate to 100 mL/h to discontinue this afternoon transition to Plasma-Lyte

## 2025-04-24 NOTE — PROGRESS NOTES
Progress Note - Nephrology   Name: Alexis Dennison 64 y.o. male I MRN: [id number]  Unit/Bed#: [facility] 904-01 I Date of Admission: 3/29/2025   Date of Service: 4/24/2025 I Hospital Day: 26     Assessment & Plan  LETY (acute kidney injury) (HCC)  #Non-Oliguric severe KDIGO LETY stage 3 with evidence of kidney recovery  Etiology: Multifactorial likely secondary to hemodynamic changes  Secondary to hemodynamic changes  Baseline creatinine 0.93 mg/dL  Current creatinine: 2.1 mg/dL, trending down with IV fluids  Peak creatinine: 2.7 mg/dL  UA: Microhematuria, leukocyturia  Renal imaging : No hydronephrosis  Treatment:  No indication for dialysis at this time, kidney function getting better  Maintain MAP:  Over 65 mmHg if possible/avoid hypoperfusion:  Hold parameters on blood pressure medications  Avoid nephrotoxic agents such as NSAIDs, and IV contrast if possible. Avoid opioids   Adjust medications to GFR    HTN, goal below 130/80  Volume: Euvolemic  Blood pressure: Hypertensive, /70  Recommend:  IV fluids as above  Primary CNS lymphoma  Status post methotrexate  Continue sodium bicarbonate drip to avoid crystallization and worsening LETY    Metabolic alkalosis  serum HCO3 31mmol/L  Decrease sodium bicarbonate to 100 mL/h to discontinue this afternoon transition to Plasma-Lyte     I have reviewed the nephrology recommendations including continue with sodium bicarb drip , with primary team, and we are in agreement with renal plan including the information outlined above. I have discussed the above management plan in detail with the primary service.     Subjective   Brief History of Admission - 65 yo ma with PMH of CNS lymphoma p/w confusion.  Nephrology is consulted for management of LETY    Patient is breathing well, no shortness of breath, no chest pain.  Continues to be confused    Objective :  Temp:  [97.4 °F (36.3 °C)-97.8 °F (36.6 °C)] 97.4 °F (36.3 °C)  HR:  [43-61] 49  BP: (142-154)/(70-79) 154/70  Resp:  [12-18]  12  SpO2:  [91 %-96 %] 93 %  O2 Device: None (Room air)    Current Weight: Weight - Scale: 78 kg (171 lb 15.3 oz)  First Weight: Weight - Scale: 86.2 kg (190 lb)  I/O         04/22 0701 04/23 0700 04/23 0701  04/24 0700 04/24 0701 04/25 0700    P.O.  470 120    I.V. (mL/kg)       Total Intake(mL/kg)  470 (6.5) 120 (1.5)    Urine (mL/kg/hr) 1750 (1) 2750 (1.6) 700 (1.8)    Total Output 1750 2750 700    Net -1750 -2280 -580           Unmeasured Urine Occurrence   1 x          Physical Exam  General:  no acute distress at this time  Skin:  No acute rash  Eyes:  No scleral icterus and noninjected  ENT:  mucous membranes moist  Neck:  no carotid bruits  Chest:  Clear to auscultation percussion, good respiratory effort, no use of accessory respiratory muscles  CVS:  Regular rate and rhythm without rub   Abdomen:  soft and nontender   Extremities: no significant lower extremity edema  Neuro:  No gross focality  Psych:  confused     Medications:    Current Facility-Administered Medications:     acetaminophen (TYLENOL) tablet 650 mg, 650 mg, Oral, Q6H PRN, Rustam Drummond MD    acetaminophen (TYLENOL) tablet 650 mg, 650 mg, Oral, Once, Deep Gallegos DO    allopurinol (ZYLOPRIM) tablet 300 mg, 300 mg, Oral, Daily, Rustam Drummond MD, 300 mg at 04/24/25 0835    alteplase (CATHFLO) injection 2 mg, 2 mg, Intracatheter, Q1MIN PRN, Rustam Drummond MD    alteplase (CATHFLO) injection 2 mg, 2 mg, Intracatheter, Q1MIN PRN, Eva Ortiz MD    aluminum-magnesium hydroxide-simethicone (MAALOX) oral suspension 30 mL, 30 mL, Oral, Q4H PRN, Rustam Drummond MD    atorvastatin (LIPITOR) tablet 20 mg, 20 mg, Oral, Daily, Rustam Drummond MD, 20 mg at 04/24/25 0835    bisacodyl (DULCOLAX) rectal suppository 10 mg, 10 mg, Rectal, Daily, Rustam Drummond MD, 10 mg at 04/24/25 0836    calcium carbonate (TUMS) chewable tablet 1,000 mg, 1,000 mg, Oral, Daily PRN, Rustam Drummond MD     chlorhexidine (PERIDEX) 0.12 % oral rinse 15 mL, 15 mL, Mouth/Throat, Q12H CAIT, Rustam Drummond MD, 15 mL at 04/24/25 0835    [Held by provider] Cholecalciferol (VITAMIN D3) tablet 2,000 Units, 2,000 Units, Oral, Daily, Rustam Drummond MD, 2,000 Units at 04/14/25 0810    dexamethasone (DECADRON) injection 4 mg, 4 mg, Intravenous, Q6H CAIT, Rustam Drummond MD, 4 mg at 04/24/25 1108    diphenhydrAMINE (BENADRYL) injection 25 mg, 25 mg, Intravenous, Once, Eva Ortiz MD    heparin (porcine) subcutaneous injection 5,000 Units, 5,000 Units, Subcutaneous, Q8H CAIT, Rustam Drummond MD, 5,000 Units at 04/24/25 0519    HYDROmorphone HCl (DILAUDID) injection 0.2 mg, 0.2 mg, Intravenous, Q2H PRN, Rustam Drummond MD    insulin glargine (LANTUS) subcutaneous injection 10 Units 0.1 mL, 10 Units, Subcutaneous, HS, Rustam Drummond MD, 10 Units at 04/23/25 2132    insulin lispro (HumALOG/ADMELOG) 100 units/mL subcutaneous injection 5-25 Units, 5-25 Units, Subcutaneous, TID AC, 5 Units at 04/24/25 1128 **AND** Fingerstick Glucose (POCT), , , TID AC, Rustam Drummond MD    insulin lispro (HumALOG/ADMELOG) 100 units/mL subcutaneous injection 5-25 Units, 5-25 Units, Subcutaneous, HS, Rustam Drummond MD, 5 Units at 04/23/25 2255    leucovorin (WELLCOVORIN) tablet 50 mg, 50 mg, Oral, Q6H, Rustam Drummond MD, 50 mg at 04/24/25 1149    levETIRAcetam (KEPPRA) tablet 500 mg, 500 mg, Oral, Q12H CAIT, Rustam Drummond MD, 500 mg at 04/24/25 0835    melatonin tablet 6 mg, 6 mg, Oral, HS, Rustam Drummond MD, 6 mg at 04/23/25 2023    OLANZapine (ZyPREXA) IM injection 5 mg, 5 mg, Intramuscular, Q8H PRN, Rustam Drummond MD, 5 mg at 04/24/25 0336    OLANZapine (ZyPREXA) tablet 10 mg, 10 mg, Oral, HS, Rylie Stapleton PA-C    OLANZapine (ZyPREXA) tablet 2.5 mg, 2.5 mg, Oral, QAM, Rylie Stapleton PA-C, 2.5 mg at 04/24/25 1149    ondansetron (ZOFRAN) injection 4  mg, 4 mg, Intravenous, Q6H PRN, Rustam Drummond MD, 4 mg at 04/14/25 0930    oxyCODONE (ROXICODONE) split tablet 2.5 mg, 2.5 mg, Oral, Q4H PRN **OR** oxyCODONE (ROXICODONE) IR tablet 5 mg, 5 mg, Oral, Q4H PRN, Rustam Drummond MD    pantoprazole (PROTONIX) EC tablet 40 mg, 40 mg, Oral, Early Morning, Rustam Drmumond MD, 40 mg at 04/24/25 0520    polyethylene glycol (MIRALAX) packet 17 g, 17 g, Oral, Daily, Rustam Drummond MD, 17 g at 04/24/25 0835    riTUXimab (RITUXAN) 712.6 mg in sodium chloride 0.9 % 285 mL first titrated chemo infusion, 375 mg/m2 (Treatment Plan Ideal), Intravenous, Once, Deep Gallegos DO    senna (SENOKOT) tablet 17.2 mg, 2 tablet, Oral, HS, Rustam Drummond MD, 17.2 mg at 04/23/25 2132    sodium bicarbonate 75 mEq in sodium chloride 0.45 % 1,000 mL infusion, 125 mL/hr, Intravenous, Continuous, Jose Mijares MD, Last Rate: 125 mL/hr at 04/24/25 1108, 125 mL/hr at 04/24/25 1108    sodium chloride 0.9 % infusion, 20 mL/hr, Intravenous, Once PRN, Rustam Drummond MD, Stopped at 04/17/25 2051    sodium chloride 0.9 % infusion, 20 mL/hr, Intravenous, Once PRN, Rustam Drummond MD    sodium chloride 0.9 % infusion, 20 mL/hr, Intravenous, Once PRN, Eva Ortiz MD      Lab Results: I have reviewed the following results:  Results from last 7 days   Lab Units 04/24/25  0515 04/23/25  0625 04/22/25  0515 04/21/25  0439 04/21/25  0437 04/20/25  0534 04/19/25  1821 04/19/25  0719 04/19/25  0528 04/18/25  0512   WBC Thousand/uL 6.33  --  10.29* 11.00*  --  7.23  --  10.29*  --  10.59*   HEMOGLOBIN g/dL 11.6*  --  14.7 15.2  --  13.6  --  13.5  --  13.9   HEMATOCRIT % 34.8*  --  41.1 41.7  --  37.3  --  36.0*  --  38.8   PLATELETS Thousands/uL 110*  --  154 142*  --  106*  --  87*  --  140*   POTASSIUM mmol/L 4.3 4.4 4.2 3.5  --  3.5 3.9  --  4.9 3.7   CHLORIDE mmol/L 106 103 96 98  --  95* 92*  --  95* 97   CO2 mmol/L 31 34* 35* 32  --  34* 30   "--  30 29   BUN mg/dL 69* 63* 55* 37*  --  20 22  --  19 18   CREATININE mg/dL 2.10* 2.63* 2.77* 1.76*  --  0.93 0.86  --  0.87 0.74   CALCIUM mg/dL 9.2 8.9 9.1 9.2  --  8.8 8.3*  --  8.8 9.1   MAGNESIUM mg/dL  --   --  2.7 2.6  --  2.2  --   --  2.2 1.9   PHOSPHORUS mg/dL 5.1* 5.3* 5.5*  --  6.2* 3.9  --   --  3.7 3.1   ALBUMIN g/dL 3.3* 3.3*  --   --   --   --   --   --   --   --        Administrative Statements     Portions of the record may have been created with voice recognition software. Occasional wrong word or \"sound a like\" substitutions may have occurred due to the inherent limitations of voice recognition software. Read the chart carefully and recognize, using context, where substitutions have occurred.If you have any questions, please contact the dictating provider.  "

## 2025-04-24 NOTE — PLAN OF CARE
Problem: PAIN - ADULT  Goal: Verbalizes/displays adequate comfort level or baseline comfort level  Description: Interventions:- Encourage patient to monitor pain and request assistance- Assess pain using appropriate pain scale- Administer analgesics based on type and severity of pain and evaluate response- Implement non-pharmacological measures as appropriate and evaluate response- Notify physician/advanced practitioner if interventions unsuccessful or patient reports new pain  Outcome: Progressing     Problem: SAFETY ADULT  Goal: Maintains/Returns to pre admission functional level  Description: INTERVENTIONS:- Perform AM-PAC 6 Click Basic Mobility/ Daily Activity assessment daily.- Set and communicate daily mobility goal to care team and patient/family/caregiver. - Collaborate with rehabilitation services on mobility goals if consulted- Reposition patient every 2 hours.- Dangle patient 3 times a day- Stand patient 3 times a day- Ambulate patient 3 times a day- Out of bed to chair 3 times a day - Out of bed for meals 3 times a day- Out of bed for toileting- Record patient progress and toleration of activity level   Outcome: Progressing     Problem: Knowledge Deficit  Goal: Patient/family/caregiver demonstrates understanding of disease process, treatment plan, medications, and discharge instructions  Description: Complete learning assessment and assess knowledge base.Interventions:- Provide teaching at level of understanding- Provide teaching via preferred learning methods  Outcome: Progressing

## 2025-04-24 NOTE — ASSESSMENT & PLAN NOTE
Lab Results   Component Value Date    CREATININE 2.10 (H) 04/24/2025    CREATININE 2.63 (H) 04/23/2025    CREATININE 2.77 (H) 04/22/2025       Lab Results   Component Value Date    EGFR 32 04/24/2025    EGFR 24 04/23/2025    EGFR 22 04/22/2025   Creatinine continues to uptrend.  Clear in the setting of hypotension episode and methotrexate toxicity  Continue IV fluids per nephrology

## 2025-04-24 NOTE — ASSESSMENT & PLAN NOTE
Appreciate oncology input  methotrexate and rituximab started this admission  Next methotrexate due 5/1 (vs delay for another 2 weeks if transitioned to a 4 week cycle), rituximab weekly per oncology note  Spouse requesting prognostic information from oncology team, relayed

## 2025-04-24 NOTE — PLAN OF CARE
Problem: PAIN - ADULT  Goal: Verbalizes/displays adequate comfort level or baseline comfort level  Description: Interventions:- Encourage patient to monitor pain and request assistance- Assess pain using appropriate pain scale- Administer analgesics based on type and severity of pain and evaluate response- Implement non-pharmacological measures as appropriate and evaluate response- Notify physician/advanced practitioner if interventions unsuccessful or patient reports new pain  Outcome: Progressing     Problem: INFECTION - ADULT  Goal: Absence or prevention of progression during hospitalization  Description: INTERVENTIONS:- Assess and monitor for signs and symptoms of infection- Monitor lab/diagnostic results- Monitor all insertion sites, i.e. indwelling lines, tubes, and drains- [place] appropriate cooling/warming therapies per order- Administer medications as ordered- Instruct and encourage patient and family to use good hand hygiene technique- Identify and instruct in appropriate isolation precautions for identified infection/condition  Outcome: Progressing     Problem: SAFETY ADULT  Goal: Patient will remain free of falls  Description: INTERVENTIONS:- Educate patient/family on patient safety including physical limitations- Instruct patient to call for assistance with activity - Consult OT/PT to assist with strengthening/mobility - Keep Call bell within reach- Keep bed low and locked with side rails adjusted as appropriate- Keep care items and personal belongings within reach- Initiate and maintain comfort rounds- Make Fall Risk Sign visible to staff- Offer Toileting every 2 Hours, in advance of need- Initiate/Maintain bed/chair alarm- Obtain necessary fall risk management equipment.- Apply yellow socks and bracelet for high fall risk patients- Consider moving patient to room near nurses station  INTERVENTIONS:- Educate patient/family on patient safety including physical limitations- Instruct patient to call  for assistance with activity - Consult OT/PT to assist with strengthening/mobility - Keep Call bell within reach- Keep bed low and locked with side rails adjusted as appropriate- Keep care items and personal belongings within reach- Initiate and maintain comfort rounds- Make Fall Risk Sign visible to staff- Offer Toileting every 2 Hours, in advance of need- Initiate/Maintain bed alarm- Obtain necessary fall risk management equipment: bracelet, socks, alarms- Apply yellow socks and bracelet for high fall risk patients- Consider moving patient to room near nurses station  Outcome: Progressing  Goal: Maintain or return to baseline ADL function  Description: INTERVENTIONS:-  Assess patient's ability to carry out ADLs; assess patient's baseline for ADL function and identify physical deficits which impact ability to perform ADLs (bathing, care of mouth/teeth, toileting, grooming, dressing, etc.)- Assess/evaluate cause of self-care deficits - Assess range of motion- Assess patient's mobility; develop plan if impaired- Assess patient's need for assistive devices and provide as appropriate- Encourage maximum independence but intervene and supervise when necessary- Involve family in performance of ADLs- Assess for home care needs following discharge - Consider OT consult to assist with ADL evaluation and planning for discharge- Provide patient education as appropriate  Outcome: Progressing  Goal: Maintains/Returns to pre admission functional level  Description: INTERVENTIONS:- Perform AM-PAC 6 Click Basic Mobility/ Daily Activity assessment daily.- Set and communicate daily mobility goal to care team and patient/family/caregiver. - Collaborate with rehabilitation services on mobility goals if consulted- Reposition patient every 2 hours.- Dangle patient 3 times a day- Stand patient 3 times a day- Ambulate patient 3 times a day- Out of bed to chair 3 times a day - Out of bed for meals 3 times a day- Out of bed for toileting-  Record patient progress and toleration of activity level   Outcome: Progressing     Problem: DISCHARGE PLANNING  Goal: Discharge to home or other facility with appropriate resources  Description: INTERVENTIONS:- Identify barriers to discharge w/patient and caregiver- Arrange for needed discharge resources and transportation as appropriate- Identify discharge learning needs (meds, wound care, etc.)- Refer to Case Management Department for coordinating discharge planning if the patient needs post-hospital services based on physician/advanced practitioner order or complex needs related to functional status, cognitive ability, or social support system  Outcome: Progressing     Problem: Knowledge Deficit  Goal: Patient/family/caregiver demonstrates understanding of disease process, treatment plan, medications, and discharge instructions  Description: Complete learning assessment and assess knowledge base.Interventions:- Provide teaching at level of understanding- Provide teaching via preferred learning methods  Outcome: Progressing     Problem: NEUROSENSORY - ADULT  Goal: Achieves stable or improved neurological status  Description: INTERVENTIONS- Monitor and report changes in neurological status- Monitor vital signs such as temperature, blood pressure, glucose, and any other labs ordered - Initiate measures to prevent increased intracranial pressure- Monitor for seizure activity and implement precautions if appropriate    INTERVENTIONS- Monitor and report changes in neurological status- Monitor vital signs such as temperature, blood pressure, glucose, and any other labs ordered - Initiate measures to prevent increased intracranial pressure- Monitor for seizure activity and implement precautions if appropriate    Outcome: Progressing  Goal: Remains free of injury related to seizures activity  Description: INTERVENTIONS- Maintain airway, patient safety  and administer oxygen as ordered- Monitor patient for seizure  activity, document and report duration and description of seizure to physician/advanced practitioner- If seizure occurs,  ensure patient safety during seizure- Reorient patient post seizure- Seizure pads on all 4 side rails- Instruct patient/family to notify RN of any seizure activity including if an aura is experienced- Instruct patient/family to call for assistance with activity based on nursing assessment- Administer anti-seizure medications if ordered  INTERVENTIONS- Maintain airway, patient safety  and administer oxygen as ordered- Monitor patient for seizure activity, document and report duration and description of seizure to physician/advanced practitioner- If seizure occurs,  ensure patient safety during seizure- Reorient patient post seizure- Seizure pads on all 4 side rails- Instruct patient/family to notify RN of any seizure activity including if an aura is experienced- Instruct patient/family to call for assistance with activity based on nursing assessment- Administer anti-seizure medications if ordered  Outcome: Progressing  Goal: Achieves maximal functionality and self care  Description: INTERVENTIONS- Monitor swallowing and airway patency with patient fatigue and changes in neurological status- Encourage and assist patient to increase activity and self care. - Encourage visually impaired, hearing impaired and aphasic patients to use assistive/communication devices  INTERVENTIONS- Monitor swallowing and airway patency with patient fatigue and changes in neurological status- Encourage and assist patient to increase activity and self care. - Encourage visually impaired, hearing impaired and aphasic patients to use assistive/communication devices  Outcome: Progressing

## 2025-04-24 NOTE — ASSESSMENT & PLAN NOTE
64 yoM with PMHx of DM2, NAFLD, and HT who presented with progressive encephalopathy found to have multiple scattered nodular cerebral and cerebellar enhancement who underwent two non-diagnostic LPs prompting stereotactic brain biopsy (4/14) which showed large B-cell lymphoma.  See oncology progress note on 4/21/2025 for full diagnostic work up.    Plan:  High-dose methotrexate (8 g/m²) every 2 weeks (C1 received 4/18, C2 planned 5/1), may consider regimen with every 4 week administration which would be more conducive to rehab  Methotrexate levels 5.8 (24 hours) > 0.6 (48 hours) > .45 (72 hours) > .16 (4/23)  Continue 50 mg every 6 hours given supratherapeutic methotrexate levels and keep urine pH >7.0 with HCO3 ggt  Rituximab (375 mg/m²) weekly x8 weeks  Appreciate nephrology managing renal function which is improving  Daily lab monitoring for TLS - CBC, CMP, Phos, LDH, Uric acid  Final path is pending from biopsy (4/14)

## 2025-04-24 NOTE — ASSESSMENT & PLAN NOTE
Status post methotrexate  Continue sodium bicarbonate drip to avoid crystallization and worsening LETY

## 2025-04-24 NOTE — ASSESSMENT & PLAN NOTE
"Patient with development of worsening confusion, recently seen at the [facility] ED 3/24/2025 with CT head at that time with finding of brain lesion for which MRI was advised to exclude cancerous etiology.     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  Lymphoma/leukemia panel nondiagnostic  CT head on 4/3 with interval increase in ventricular caliber concerning for worsening hydrocephalus  No indication for AED per neurosurgery and holding off steroids until Dx achieved  Continue neurochecks   Neurosurgery and oncology recommended repeat high-volume LP as previous was nondiagnostic  Post LP 4/7 which was again undiagnostic.   MRI of the brain from 4/11-\"Interval enlargement of the dominant left anterior basal ganglionic mass lesion with greater surrounding vasogenic edema and mass effect. 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 Differential considerations include lymphoma, multicentric high-grade glioma, and less likely metastasis\"  S/p brain bx 4/14 by neurosurgery   Nondiagnostic LPs  Patient pulled out EVD over the weekend  Discussed with hematology oncology on plan for Rituxan every week.  Will need to get the rehab facility does get accommodate for this in the outpatient setting.  Will need inpatient methotrexate every 2 or 4 weeks. Still finalizing treatment plan  Heme-onc on board  Plan for high-dose " methotrexate every 2 weeks last received on 4/18, planned on 5/1.  Rituximab weekly due next week 4/25  Patient needs leucovorin 50 mg every 6 hours, which she has been receiving  Discussed with case management today.  [facility] is unable to accept if patient is ongoing chemo.  We are trying to refer to [facility] for clinical review  \

## 2025-04-24 NOTE — ASSESSMENT & PLAN NOTE
"Palliative diagnosis: newly diagnosed large B cell lymphoma    Symptom management:  Olanzapine 10mg HS and 2.5mg QAM, additional PRN IM olanzapine available   Discussed this plan with PMR  Goal to wean patient from restraints to enable therapy/discharge planning  Continues on decadron 4mg Q6H, wean as appropriate  oxyIR 2.5-5mg PO Q4H PRN moderate-severe pain  Dilaudid 0.2mg IV Q2H PRN BT pain  Acetaminophen 650mg Q6H PRN mild pain  Bowel regimen in place  Notified primary team that patient uses CPAP HS    Goals:  Level 1 code status  Disease focused care without limits placed  Patient initiated high-dose methotrexate + rituximab inpatient  Per oncology note methotrexate every other week (next dose 5/1) and rituximab weekly. However, may transition to a 4 week cycle for methotrexate    Decisional apparatus:  Patient does not have capacity on exam today.  If capacity is lost, patient's substitute decision maker would default to spouse and adult children by [place] Act 169.  ER contacts:  Elizabeth Chester  Daughter  [phone]  Guy Naldo \"Naldo\" Phillip  Spouse  [phone]    Patient also has a son, Drew, and another daughter, Marina, who has down syndrome. Elizabeth is the main contact but family makes decisions as unit.    Advance Directive/Living Will/POLST: none on file    Social support:  Patient support system: spouse Naldo, 3 adult children, extended family.  Spouse Naldo lives locally and daughter Elizabeth is from [place] but Naldo prefers that she is primary contact due to language barrier (Chinese)  4/22: Met with Naldo along with palliative SW (Pato) and  (Kaylee #[id number]).  Provided medical update, supportive listening, answered questions to her satisfaction  Naldo asks for oncology feedback on prognosis, this was relayed to their team  Supportive listening provided  Normalized experience of patient/family  Provided anticipatory guidance  Advocated for patient/family with interdisciplinary care " team    Coordination of care:  Reviewed case with RN, oncology, PMR    Follow up  Palliative Care will continue to follow for support and goals of care discussions will be ongoing pending course.  Please reach out via [employer] secure chat if questions or concerns arise.    We appreciate the invitation to be involved in this patient's care.

## 2025-04-24 NOTE — PROGRESS NOTES
"Progress Note - Palliative Care   Name: Alexis Dennison 64 y.o. male I MRN: [id number]  [facility] I Date of Admission: 3/29/2025   Date of Service: 4/24/2025 I Hospital Day: 26     Assessment & Plan  Palliative care encounter  Palliative diagnosis: newly diagnosed large B cell lymphoma    Symptom management:  Olanzapine 10mg HS and 2.5mg QAM, additional PRN IM olanzapine available   Discussed this plan with PMR  Goal to wean patient from restraints to enable therapy/discharge planning  Continues on decadron 4mg Q6H, wean as appropriate  oxyIR 2.5-5mg PO Q4H PRN moderate-severe pain  Dilaudid 0.2mg IV Q2H PRN BT pain  Acetaminophen 650mg Q6H PRN mild pain  Bowel regimen in place  Notified primary team that patient uses CPAP HS    Goals:  Level 1 code status  Disease focused care without limits placed  Patient initiated high-dose methotrexate + rituximab inpatient  Per oncology note methotrexate every other week (next dose 5/1) and rituximab weekly. However, may transition to a 4 week cycle for methotrexate    Decisional apparatus:  Patient does not have capacity on exam today.  If capacity is lost, patient's substitute decision maker would default to spouse and adult children by [place] Act 169.  ER contacts:  Elizabeth Chester  Daughter  [phone]  Guy Naldo \"Naldo\" Phillip  Spouse  [phone]    Patient also has a son, Drew, and another daughter, Marina, who has down syndrome. Elizabeth is the main contact but family makes decisions as unit.    Advance Directive/Living Will/POLST: none on file    Social support:  Patient support system: spouse Naldo, 3 adult children, extended family.  Spouse Naldo lives locally and daughter Elizabeth is from [place] but Naldo prefers that she is primary contact due to language barrier (Chinese)  4/22: Met with Naldo along with palliative SW (Shikha and Brynn) and  (Kaylee #[id number]).  Provided medical update, supportive listening, answered questions to her satisfaction  Naldo asks for " oncology feedback on prognosis, this was relayed to their team  Supportive listening provided  Normalized experience of patient/family  Provided anticipatory guidance  Advocated for patient/family with interdisciplinary care team    Coordination of care:  Reviewed case with RN, oncology, PMR    Follow up  Palliative Care will continue to follow for support and goals of care discussions will be ongoing pending course.  Please reach out via [employer] secure chat if questions or concerns arise.    We appreciate the invitation to be involved in this patient's care.   Brain tumor (HCC)  Admitted 3/29 with worsening confusion in the setting of recent ED visit with CTA head and neck 3/24 demonstrating multiple nonspecific hyperdense ependymal/subependymal nodules, largest located adjacent to the foramen of Monro (2 x 1.1 x 1 cm ) and f/u outpatient MRI 3/26 demonstrating multiple scattered foci of subependymal nodular enhancement throughout ventricles (left worse than right) with mild hydrocephalus (left worse than right), scattered nodular areas of enhancement in left anterior inferior basal ganglia involving left anterior commissure, and smaller foci of nodular enhancement along anteromedial aspect of left cerebral peduncle and left quirino.with concern for primary CNS malignancy  Per sister, both patient's parents with history of lymphoma  CTH on admission 3/29 stable subependymal nodules and left foramen of Monro region hyperdense lesion, dilation of left lateral ventricle and minimal left to right midline shift  4/3 imaging concerning for worsening hydrocephalus  2 non diagnostic lumbar punctures performed  MRI brain 4/11 with interval disease progression  4/13 developed worsening mentation and CTH demonstrated predominantly left sided obstructive hydrocephalus from lesion located adjacent to the foramen of Monro, now s/p left front EVD  4/14 underwent left frontal endoscopic biopsy and replacement of EVD  Preliminary  pathology large B-cell lymphoma    Medical oncology neurosurgery following  Primary CNS lymphoma  Appreciate oncology input  methotrexate and rituximab started this admission  Next methotrexate due  (vs delay for another 2 weeks if transitioned to a 4 week cycle), rituximab weekly per oncology note  Spouse requesting prognostic information from oncology team, relayed  Encephalopathy  In the setting of the above  On exam, he is confused but interactive. Oriented to name only, does not recall , disoriented to all else.  EVD dislodged due to  agitation over the weekend  Remains on UE mits/restraints, wean as tolerated. Olanzapine as above  Pulmonary nodule  CTA 3/29 demonstrates non specific 4 mm right lower lobe pulmonary nodule  Recommended 3 mo f/u  Liver lesion  CTA 3/29 demonstrates non specific 12 mm hypodense lesion within right hepatic lobe, MRI recommended  MRI 3/30 demonstrates no suspicious hepatic lesions, simple right hepatic lobe cyst noted    LETY (acute kidney injury) (HCC)  Nephrology following. Likely secondary to methotrexate and hypotensive episode. Continue to monitor. Leucovorin for supra therapeutic methotrexate levels  Metabolic alkalosis    Interval history:       No events overnight. Patient denies complaints during time of encounter. Remains in mits/restraints and virtual 1:1 for ongoing impulsivity and attempts to get OOB. He has consistently been requiring PRN olanzapine overnight in addition to scheduled dose.     MEDICATIONS / ALLERGIES:     all current active meds have been reviewed    No Known Allergies    OBJECTIVE:    Physical Exam  Physical Exam  HENT:      Head: Atraumatic.      Comments: EVD site CDI  Eyes:      Conjunctiva/sclera: Conjunctivae normal.   Cardiovascular:      Rate and Rhythm: Normal rate.   Pulmonary:      Effort: No respiratory distress.   Abdominal:      Tenderness: There is no guarding.   Musculoskeletal:         General: No swelling.   Skin:     General:  Skin is warm and dry.   Neurological:      Mental Status: He is alert.      Comments: Confused, oriented to self only   Psychiatric:      Comments: Impulsive at times, attempting to get OOB         Lab Results:   Results from last 7 days   Lab Units 04/24/25  0515 04/22/25  0515 04/21/25  0439 04/20/25  0534 04/19/25  0719   WBC Thousand/uL 6.33 10.29* 11.00*   < > 10.29*   HEMOGLOBIN g/dL 11.6* 14.7 15.2   < > 13.5   HEMATOCRIT % 34.8* 41.1 41.7   < > 36.0*   PLATELETS Thousands/uL 110* 154 142*   < > 87*   SEGS PCT % 89*  --   --   --  89*   MONO PCT % 1* 0*  --   --  5   EOS PCT % 0 0  --   --  0    < > = values in this interval not displayed.     Results from last 7 days   Lab Units 04/24/25  0515 04/23/25  0625 04/22/25  0515   POTASSIUM mmol/L 4.3 4.4 4.2   CHLORIDE mmol/L 106 103 96   CO2 mmol/L 31 34* 35*   BUN mg/dL 69* 63* 55*   CREATININE mg/dL 2.10* 2.63* 2.77*   CALCIUM mg/dL 9.2 8.9 9.1   ALK PHOS U/L 38 41  --    ALT U/L 72* 77*  --    AST U/L 26 27  --        Imaging Studies: reviewed pertinent studies   EKG, Pathology, and Other Studies: reviewed pertinent studies    Counseling / Coordination of Care    Total floor / unit time spent today 25+ minutes. Greater than 50% of total time was spent with the patient and / or family counseling and / or coordination of care. A description of the counseling / coordination of care: symptom assessment and management, medication review, medication adjustment, psychosocial support, supportive listening, and coordination with oncology, PMR, RN

## 2025-04-24 NOTE — TREATMENT TEAM
Palliative LSW saw patient at the bedside today. LSW appreciates the opportunity to provider patient/family with inpatient emotional support and guidance while patient continues to receive medical attention from the medical team     Topics discussed: The pt remained confused and continues to be in restraints.  The pt son, Drew was visiting.  The pt was attempting to get out of bed stating he has to use the bathroom.  The pt wife Naldo was on the phone re-directing him to remain in the bed.      Naldo states that she is doing alright. She had just finished mowing the lawn and informed the pt son she would prepare dinner for him in the evening.     Areas that need follow-up:ongoing emotional support to the pt wife and family  Resources given:Supportive listening  Others present:  The pt sonDrew    I have spent 15 minutes with Patient and family today in which greater than 50% of this time was spent in counseling/coordination of care regarding Counseling / Coordination of care.       LSW will continue to follow as requested by the medical team, patient, or family

## 2025-04-24 NOTE — ASSESSMENT & PLAN NOTE
Worsening, patient is more lethargic today  Due to brain mass as above  Patient with acute onset confusion, forgetting names/places, oriented to self only most of the times  Delirium precautions  Patient has improving encephalopathy according to the daughter.  Plan noted above.

## 2025-04-24 NOTE — PROGRESS NOTES
Progress Note - Oncology-Medical   Name: Alexis Dennison 64 y.o. male I MRN: [id number]  Unit/Bed#: [facility] 904-01 I Date of Admission: 3/29/2025   Date of Service: 4/24/2025 I Hospital Day: 26    Assessment & Plan  Primary CNS lymphoma  64 yoM with PMHx of DM2, NAFLD, and HT who presented with progressive encephalopathy found to have multiple scattered nodular cerebral and cerebellar enhancement who underwent two non-diagnostic LPs prompting stereotactic brain biopsy (4/14) which showed large B-cell lymphoma.  See oncology progress note on 4/21/2025 for full diagnostic work up.    Plan:  High-dose methotrexate (8 g/m²) every 2 weeks (C1 received 4/18, C2 planned 5/1), may consider regimen with every 4 week administration which would be more conducive to rehab  Methotrexate levels 5.8 (24 hours) > 0.6 (48 hours) > .45 (72 hours) > .16 (4/23)  Continue 50 mg every 6 hours given supratherapeutic methotrexate levels and keep urine pH >7.0 with HCO3 ggt  Rituximab (375 mg/m²) weekly x8 weeks  Appreciate nephrology managing renal function which is improving  Daily lab monitoring for TLS - CBC, CMP, Phos, LDH, Uric acid  Final path is pending from biopsy (4/14)  Pulmonary nodule  CTA 3/29 with nonspecific 4 mm RLL pulm nodule, recommendation for 3 month follow up  Liver lesion  CTA 3/29 with nonspecific 12mm hypodense right hepatic lesion, recommended MRI abdomen  MRI 3/30 with no suspicious hepatic lesions, demonstrated simple right hepatic lobe cyst  LETY (acute kidney injury) (HCC)  Appreciate nephrology, improving    Subjective   NAEON, AAOx1, calm, no new complaints, gloves and remote monitor in place    Objective :  Temp:  [97.4 °F (36.3 °C)-97.8 °F (36.6 °C)] 97.6 °F (36.4 °C)  HR:  [41-50] 48  BP: (143-154)/(69-79) 146/72  Resp:  [12-18] 16  SpO2:  [91 %-96 %] 94 %  O2 Device: None (Room air)    Physical Exam  General: AAOx1-2, ill-appearing, confused, no acute distress  HEENT: well-healing surgical scars, no ventricular  drain, PERRLA, moist mucosa  Respiratory: CTAB w/o wheezes, rales, or rhonchi, no increased work of breathing  Cardiovascular: regular rhythm, bradycardia, w/o murmurs, 2+ radial and pedal pulses, no b/l LE edema  Abdomen: soft, non-tender, non-distended, no hepatomegaly or splenomegaly  /Rectal: deferred  Musculoskeletal: moves all four extremities with normal strength and ROM  Integumentary: warm, dry, no rash or bruising  Neurological: confused, reduced coordination  Psychiatric: pleasant and cooperative with normal mood, affect, and cognition      Lab Results: I have reviewed the following results:CBC/BMP:   .     04/24/25  0515   WBC 6.33   HGB 11.6*   HCT 34.8*   *   SODIUM 145   K 4.3      CO2 31   BUN 69*   CREATININE 2.10*   GLUC 151*   PHOS 5.1*    , LFTs:   .     04/24/25 0515   AST 26   ALT 72*   ALB 3.3*   TBILI 0.93   ALKPHOS 38      Lab Results   Component Value Date    K 4.3 04/24/2025     04/24/2025    CO2 31 04/24/2025    BUN 69 (H) 04/24/2025    CREATININE 2.10 (H) 04/24/2025    GLUF 131 (H) 02/22/2025    CALCIUM 9.2 04/24/2025    CORRECTEDCA 9.8 04/24/2025    AST 26 04/24/2025    ALT 72 (H) 04/24/2025    ALKPHOS 38 04/24/2025    EGFR 32 04/24/2025     Lab Results   Component Value Date    WBC 6.33 04/24/2025    HGB 11.6 (L) 04/24/2025    HCT 34.8 (L) 04/24/2025    MCV 91 04/24/2025     (L) 04/24/2025     Lab Results   Component Value Date    NEUTROABS 5.64 04/24/2025     Imaging Results Review: No pertinent imaging studies reviewed.  Other Study Results Review: No additional pertinent studies reviewed.    Administrative Statements   I have spent a total time of 35 minutes in caring for this patient on the day of the visit/encounter including Diagnostic results, Prognosis, Risks and benefits of tx options, Instructions for management, Patient and family education, Importance of tx compliance, Risk factor reductions, Impressions, Counseling / Coordination of care,  Documenting in the medical record, Reviewing/placing orders in the medical record (including tests, medications, and/or procedures), Obtaining or reviewing history  , and Communicating with other healthcare professionals .    Additional recommendations to follow per attending, Dr. Gallegos.    Bryson Garcia DO, PGY4  Hematology/Oncology Fellow

## 2025-04-24 NOTE — NURSING NOTE
Pt received Rituxan today.  Pt tolerated infusion well.  At the end of the infusion, pt became more alert, however confused and agitated.  Pt insisted that he has a suitcase here, he did not agree for treatment and he would like to get dressed and go to work.  PRN zyprexa given to help decrease agitation.   Pt does continue to pull at urinary catheter and IV line.   Concern for pt's safety continues.  Pt's son at bedside and he helped do a video call with pt's daughter.      Agreed to work with patient and ambulate him to see if that would also help with his agitation.    Ambulated pt with walker and standby with 2 and a chair/recliner following for safety.  Pt ambulated about 200 feet in the hallway and was agreeable to go back to the room.  Pt does continue to insist that he has a suitcase here and he needs to get dressed to go on a trip or to work.  1:1 maintained and pt returned to a posey and the hand mitts for safety.

## 2025-04-24 NOTE — PROGRESS NOTES
"Progress Note - Hospitalist   Name: Alexis Dennison 64 y.o. male I MRN: [id number]  Unit/Bed#: [facility] 904-01 I Date of Admission: 3/29/2025   Date of Service: 4/24/2025 I Hospital Day: 26     Assessment & Plan  Brain tumor (HCC)  Patient with development of worsening confusion, recently seen at the [facility] ED 3/24/2025 with CT head at that time with finding of brain lesion for which MRI was advised to exclude cancerous etiology.     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  Lymphoma/leukemia panel nondiagnostic  CT head on 4/3 with interval increase in ventricular caliber concerning for worsening hydrocephalus  No indication for AED per neurosurgery and holding off steroids until Dx achieved  Continue neuroccks   Neurosurgery and oncology recommended repeat high-volume LP as previous was nondiagnostic  Post LP 4/7 which was again undiagnostic.   MRI of the brain from 4/11-\"Interval enlargement of the dominant left anterior basal ganglionic mass lesion with greater surrounding vasogenic edema and mass effect. 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 Differential considerations include lymphoma, multicentric high-grade glioma, and less likely metastasis\"  S/p brain bx 4/14 by neurosurgery   Nondiagnostic LPs  Patient pulled out EVD over the weekend  Discussed with daughter and appears that patient is more " improved at this time  Heme-onc on board  Plan for high-dose methotrexate every 2 weeks last received on 4/18, planned on 5/1.  Plan for rituximab today.  Patient needs leucovorin 50 mg every 6 hours, which she has been receiving  Unfortunately patient is acutely altered due to the Benadryl that we gave before the Rituxan.  Was confused and was trying to pull out IVs  Needed to continue the restraints  Needs continuous one-to-one observation.  LETY (acute kidney injury) (HCC)  Lab Results   Component Value Date    CREATININE 2.10 (H) 04/24/2025    CREATININE 2.63 (H) 04/23/2025    CREATININE 2.77 (H) 04/22/2025       Lab Results   Component Value Date    EGFR 32 04/24/2025    EGFR 24 04/23/2025    EGFR 22 04/22/2025   Creatinine continues to uptrend.  Clear in the setting of hypotension episode and methotrexate toxicity  Continue IV fluids per nephrology    Encephalopathy  Worsening, patient is more lethargic today  Due to brain mass as above  Patient with acute onset confusion, forgetting names/places, oriented to self only most of the times  Delirium precautions  Patient has improving encephalopathy according to the daughter.  Plan noted above.  Type 2 diabetes mellitus with hyperglycemia, without long-term current use of insulin (HCC)  Has been well-controlled as outpatient with hemoglobin A1c of 6.6%  Hold home oral medications, start correctional insulin coverage   Accu-Cheks reviewed and acceptable  Continue hypoglycemia protocol and carb controlled diet  Mixed hyperlipidemia  Continue statin  HTN, goal below 130/80  Continue losartan and hydrochlorothiazide  BP reviewed and acceptable  Liver lesion  MRI obtained: No suspicious hepatic lesions.  There is a simple right hepatic lobe cyst.  Mild diffuse hepatic steatosis.  LFTs stable  Outpatient follow up advised  Thyroid nodule  Incidental findings on CT scan  Nonemergent thyroid ultrasound for follow-up in the outpatient setting  Pulmonary nodule  Imaging with  4 mm right lower lobe pulmonary nodule.  CT chest 3-month follow-up  Constipation  Continue senna, MiraLAX and suppository  Ambulate patient as able  Ambulatory dysfunction  PT/OT recommending level 2 at discharge  Awaiting medically stability  Ambulate patient as able  Fall precautions  Goals of care, counseling/discussion    Palliative care encounter    Primary CNS lymphoma    Metabolic alkalosis          VTE Pharmacologic Prophylaxis: VTE Score: 5 High Risk (Score >/= 5) - Pharmacological DVT Prophylaxis Ordered: heparin. Sequential Compression Devices Ordered.    Mobility:   Basic Mobility Inpatient Raw Score: 11  -HLM Goal: 4: Move to chair/commode  JH-HLM Achieved: 3: Sit at edge of bed  JH-HLM Goal NOT achieved. Continue with multidisciplinary rounding and encourage appropriate mobility to improve upon JH-HLM goals.    Patient Centered Rounds: I performed bedside rounds with nursing staff today.   Discussions with Specialists or Other Care Team Provider: Palliative, nephrology    Education and Discussions with Family / Patient: Updated  (daughter) via phone.    Current Length of Stay: 26 day(s)  Current Patient Status: Inpatient   Certification Statement: The patient will continue to require additional inpatient hospital stay due to AMS, milton  Discharge Plan: Anticipate discharge in 24-48 hrs to rehab facility.    Code Status: Level 1 - Full Code    Subjective   No concerns     Objective :  Temp:  [97.4 °F (36.3 °C)-97.8 °F (36.6 °C)] 97.4 °F (36.3 °C)  HR:  [41-55] 41  BP: (142-154)/(70-79) 148/70  Resp:  [12-18] 12  SpO2:  [92 %-96 %] 95 %  O2 Device: None (Room air)    Body mass index is 24.67 kg/m².     Input and Output Summary (last 24 hours):     Intake/Output Summary (Last 24 hours) at 4/24/2025 1505  Last data filed at 4/24/2025 1043  Gross per 24 hour   Intake 360 ml   Output 2200 ml   Net -1840 ml       Physical Exam  Vitals and nursing note reviewed.   Constitutional:        Appearance: He is well-developed and normal weight.   HENT:      Head: Normocephalic and atraumatic.      Nose: Nose normal.      Mouth/Throat:      Mouth: Mucous membranes are moist.   Eyes:      Conjunctiva/sclera: Conjunctivae normal.   Cardiovascular:      Rate and Rhythm: Normal rate and regular rhythm.      Heart sounds: Normal heart sounds. No murmur heard.  Pulmonary:      Effort: Pulmonary effort is normal. No respiratory distress.      Breath sounds: Normal breath sounds.   Abdominal:      General: Abdomen is flat.      Palpations: Abdomen is soft.      Tenderness: There is no abdominal tenderness.   Musculoskeletal:         General: No swelling.      Cervical back: Neck supple.      Right lower leg: No edema.      Left lower leg: No edema.   Skin:     General: Skin is warm and dry.      Capillary Refill: Capillary refill takes less than 2 seconds.   Neurological:      General: No focal deficit present.      Mental Status: He is alert. Mental status is at baseline. He is disoriented.      Comments: rESTRAINED. lethargic   Psychiatric:         Mood and Affect: Mood normal.           Lines/Drains:  Lines/Drains/Airways       Active Status       Name Placement date Placement time Site Days    PICC Line 04/16/25 Right Basilic 04/16/25  1526  Basilic  7    External Urinary Catheter Medium 04/24/25  1000  -- less than 1                    Central Line:  Goal for removal: Will discontinue when hemodynamically stable.               Lab Results: I have reviewed the following results:   Results from last 7 days   Lab Units 04/24/25  0515   WBC Thousand/uL 6.33   HEMOGLOBIN g/dL 11.6*   HEMATOCRIT % 34.8*   PLATELETS Thousands/uL 110*   SEGS PCT % 89*   LYMPHO PCT % 9*   MONO PCT % 1*   EOS PCT % 0     Results from last 7 days   Lab Units 04/24/25  0515   SODIUM mmol/L 145   POTASSIUM mmol/L 4.3   CHLORIDE mmol/L 106   CO2 mmol/L 31   BUN mg/dL 69*   CREATININE mg/dL 2.10*   ANION GAP mmol/L 8   CALCIUM mg/dL 9.2    ALBUMIN g/dL 3.3*   TOTAL BILIRUBIN mg/dL 0.93   ALK PHOS U/L 38   ALT U/L 72*   AST U/L 26   GLUCOSE RANDOM mg/dL 151*         Results from last 7 days   Lab Units 04/24/25  1118 04/24/25  0721 04/23/25  2036 04/23/25  1610 04/23/25  1054 04/23/25  0644 04/22/25  2158 04/22/25  1639 04/22/25  1053 04/22/25  0617 04/21/25  2112 04/21/25  1724   POC GLUCOSE mg/dl 197* 156* 190* 206* 157* 111 176* 287* 119 135 198* 200*               Recent Cultures (last 7 days):           Last 24 Hours Medication List:     Current Facility-Administered Medications:     acetaminophen (TYLENOL) tablet 650 mg, Q6H PRN    allopurinol (ZYLOPRIM) tablet 300 mg, Daily    alteplase (CATHFLO) injection 2 mg, Q1MIN PRN    alteplase (CATHFLO) injection 2 mg, Q1MIN PRN    aluminum-magnesium hydroxide-simethicone (MAALOX) oral suspension 30 mL, Q4H PRN    atorvastatin (LIPITOR) tablet 20 mg, Daily    bisacodyl (DULCOLAX) rectal suppository 10 mg, Daily    calcium carbonate (TUMS) chewable tablet 1,000 mg, Daily PRN    chlorhexidine (PERIDEX) 0.12 % oral rinse 15 mL, Q12H CAIT    [Held by provider] Cholecalciferol (VITAMIN D3) tablet 2,000 Units, Daily    dexamethasone (DECADRON) injection 4 mg, Q6H CAIT    heparin (porcine) subcutaneous injection 5,000 Units, Q8H CAIT    HYDROmorphone HCl (DILAUDID) injection 0.2 mg, Q2H PRN    insulin glargine (LANTUS) subcutaneous injection 10 Units 0.1 mL, HS    insulin lispro (HumALOG/ADMELOG) 100 units/mL subcutaneous injection 5-25 Units, TID AC **AND** Fingerstick Glucose (POCT), TID AC    insulin lispro (HumALOG/ADMELOG) 100 units/mL subcutaneous injection 5-25 Units, HS    leucovorin (WELLCOVORIN) tablet 50 mg, Q6H    levETIRAcetam (KEPPRA) tablet 500 mg, Q12H CAIT    melatonin tablet 6 mg, HS    OLANZapine (ZyPREXA) IM injection 5 mg, Q8H PRN    OLANZapine (ZyPREXA) tablet 10 mg, HS    OLANZapine (ZyPREXA) tablet 2.5 mg, QAM    ondansetron (ZOFRAN) injection 4 mg, Q6H PRN    oxyCODONE (ROXICODONE) split  tablet 2.5 mg, Q4H PRN **OR** oxyCODONE (ROXICODONE) IR tablet 5 mg, Q4H PRN    pantoprazole (PROTONIX) EC tablet 40 mg, Early Morning    polyethylene glycol (MIRALAX) packet 17 g, Daily    senna (SENOKOT) tablet 17.2 mg, HS    sodium bicarbonate 100 mEq in dextrose 5 % 1,000 mL infusion, Continuous, Last Rate: 100 mL/hr (04/24/25 1456)    sodium chloride 0.9 % infusion, Once PRN, Last Rate: Stopped (04/17/25 2051)    sodium chloride 0.9 % infusion, Once PRN    sodium chloride 0.9 % infusion, Once PRN    Administrative Statements   Today, Patient Was Seen By: Rustam Drummond MD  I have spent a total time of 40 minutes in caring for this patient on the day of the visit/encounter including Diagnostic results, Prognosis, Risks and benefits of tx options, Instructions for management, Patient and family education, Importance of tx compliance, Risk factor reductions, Impressions, Counseling / Coordination of care, Documenting in the medical record, Reviewing/placing orders in the medical record (including tests, medications, and/or procedures), Obtaining or reviewing history  , and Communicating with other healthcare professionals .    **Please Note: This note may have been constructed using a voice recognition system.**

## 2025-04-24 NOTE — CASE MANAGEMENT
Case Management Progress Note    Patient name Alexis Dennison  Location [facility] 904/[facility] 904-01 MRN [id number]  : 1960 Date 2025       LOS (days): 26  Geometric Mean LOS (GMLOS) (days): 11.1  Days to GMLOS:-14.4        OBJECTIVE:        Current admission status: Inpatient  Preferred Pharmacy:   [facility]/pharm[facility] #[id number] - [place] - [address] [address]  [address] [address]  Phone: [phone] Fax: [phone]    [facility] - [place], [place] - [address]  [address]  [facility] [address]  Phone: [phone] Fax: [phone]    Primary Care Provider: PATI Mckeon    Primary Insurance: BLUE CROSS  Secondary Insurance: CAPITAL    PROGRESS NOTE: CM spoke with pt's daughter Elizabeth and provided update.  Pt's family preference is for pt to go to acute rehab once medically cleared.  CM will continue to follow for discharge planning needs.

## 2025-04-25 LAB
ALBUMIN SERPL BCG-MCNC: 3.2 G/DL (ref 3.5–5)
ALP SERPL-CCNC: 41 U/L (ref 34–104)
ALT SERPL W P-5'-P-CCNC: 89 U/L (ref 7–52)
ANION GAP SERPL CALCULATED.3IONS-SCNC: 7 MMOL/L (ref 4–13)
AST SERPL W P-5'-P-CCNC: 38 U/L (ref 13–39)
BACTERIA UR QL AUTO: ABNORMAL /HPF
BACTERIA UR QL AUTO: ABNORMAL /HPF
BASOPHILS # BLD AUTO: 0 THOUSANDS/ÂΜL (ref 0–0.1)
BASOPHILS NFR BLD AUTO: 0 % (ref 0–1)
BILIRUB SERPL-MCNC: 0.85 MG/DL (ref 0.2–1)
BILIRUB UR QL STRIP: NEGATIVE
BILIRUB UR QL STRIP: NEGATIVE
BUN SERPL-MCNC: 51 MG/DL (ref 5–25)
CALCIUM ALBUM COR SERPL-MCNC: 9.7 MG/DL (ref 8.3–10.1)
CALCIUM SERPL-MCNC: 9.1 MG/DL (ref 8.4–10.2)
CHLORIDE SERPL-SCNC: 104 MMOL/L (ref 96–108)
CLARITY UR: ABNORMAL
CLARITY UR: ABNORMAL
CO2 SERPL-SCNC: 33 MMOL/L (ref 21–32)
COLOR UR: ABNORMAL
COLOR UR: COLORLESS
CREAT SERPL-MCNC: 1.77 MG/DL (ref 0.6–1.3)
EOSINOPHIL # BLD AUTO: 0 THOUSAND/ÂΜL (ref 0–0.61)
EOSINOPHIL NFR BLD AUTO: 0 % (ref 0–6)
ERYTHROCYTE [DISTWIDTH] IN BLOOD BY AUTOMATED COUNT: 11.6 % (ref 11.6–15.1)
GFR SERPL CREATININE-BSD FRML MDRD: 39 ML/MIN/1.73SQ M
GLUCOSE SERPL-MCNC: 140 MG/DL (ref 65–140)
GLUCOSE SERPL-MCNC: 145 MG/DL (ref 65–140)
GLUCOSE SERPL-MCNC: 166 MG/DL (ref 65–140)
GLUCOSE SERPL-MCNC: 166 MG/DL (ref 65–140)
GLUCOSE SERPL-MCNC: 171 MG/DL (ref 65–140)
GLUCOSE SERPL-MCNC: 172 MG/DL (ref 65–140)
GLUCOSE UR STRIP-MCNC: ABNORMAL MG/DL
GLUCOSE UR STRIP-MCNC: ABNORMAL MG/DL
HCT VFR BLD AUTO: 33.5 % (ref 36.5–49.3)
HGB BLD-MCNC: 11.6 G/DL (ref 12–17)
HGB UR QL STRIP.AUTO: ABNORMAL
HGB UR QL STRIP.AUTO: ABNORMAL
IMM GRANULOCYTES # BLD AUTO: 0.04 THOUSAND/UL (ref 0–0.2)
IMM GRANULOCYTES NFR BLD AUTO: 1 % (ref 0–2)
KETONES UR STRIP-MCNC: NEGATIVE MG/DL
KETONES UR STRIP-MCNC: NEGATIVE MG/DL
LDH SERPL-CCNC: 221 U/L (ref 140–271)
LEUKOCYTE ESTERASE UR QL STRIP: ABNORMAL
LEUKOCYTE ESTERASE UR QL STRIP: NEGATIVE
LYMPHOCYTES # BLD AUTO: 0.58 THOUSANDS/ÂΜL (ref 0.6–4.47)
LYMPHOCYTES NFR BLD AUTO: 10 % (ref 14–44)
MCH RBC QN AUTO: 31.2 PG (ref 26.8–34.3)
MCHC RBC AUTO-ENTMCNC: 34.6 G/DL (ref 31.4–37.4)
MCV RBC AUTO: 90 FL (ref 82–98)
MONOCYTES # BLD AUTO: 0.18 THOUSAND/ÂΜL (ref 0.17–1.22)
MONOCYTES NFR BLD AUTO: 3 % (ref 4–12)
MTX SERPL-SCNC: 0.1 UMOL/L
NEUTROPHILS # BLD AUTO: 5.19 THOUSANDS/ÂΜL (ref 1.85–7.62)
NEUTS SEG NFR BLD AUTO: 86 % (ref 43–75)
NITRITE UR QL STRIP: NEGATIVE
NITRITE UR QL STRIP: NEGATIVE
NON-SQ EPI CELLS URNS QL MICRO: ABNORMAL /HPF
NON-SQ EPI CELLS URNS QL MICRO: ABNORMAL /HPF
NRBC BLD AUTO-RTO: 0 /100 WBCS
PH UR STRIP.AUTO: 7.5 [PH]
PH UR STRIP.AUTO: 7.5 [PH]
PHOSPHATE SERPL-MCNC: 4.4 MG/DL (ref 2.3–4.1)
PLATELET # BLD AUTO: 81 THOUSANDS/UL (ref 149–390)
PMV BLD AUTO: 9.9 FL (ref 8.9–12.7)
POTASSIUM SERPL-SCNC: 4.1 MMOL/L (ref 3.5–5.3)
PROT SERPL-MCNC: 5.5 G/DL (ref 6.4–8.4)
PROT UR STRIP-MCNC: ABNORMAL MG/DL
PROT UR STRIP-MCNC: NEGATIVE MG/DL
RBC # BLD AUTO: 3.72 MILLION/UL (ref 3.88–5.62)
RBC #/AREA URNS AUTO: ABNORMAL /HPF
RBC #/AREA URNS AUTO: ABNORMAL /HPF
SODIUM SERPL-SCNC: 144 MMOL/L (ref 135–147)
SP GR UR STRIP.AUTO: 1.01 (ref 1–1.03)
SP GR UR STRIP.AUTO: 1.01 (ref 1–1.03)
URATE SERPL-MCNC: 3.2 MG/DL (ref 3.5–8.5)
UROBILINOGEN UR STRIP-ACNC: <2 MG/DL
UROBILINOGEN UR STRIP-ACNC: <2 MG/DL
WBC # BLD AUTO: 5.99 THOUSAND/UL (ref 4.31–10.16)
WBC #/AREA URNS AUTO: ABNORMAL /HPF
WBC #/AREA URNS AUTO: ABNORMAL /HPF

## 2025-04-25 PROCEDURE — NC001 PR NO CHARGE: Performed by: INTERNAL MEDICINE

## 2025-04-25 PROCEDURE — 82948 REAGENT STRIP/BLOOD GLUCOSE: CPT

## 2025-04-25 PROCEDURE — 84100 ASSAY OF PHOSPHORUS: CPT

## 2025-04-25 PROCEDURE — 97129 THER IVNTJ 1ST 15 MIN: CPT

## 2025-04-25 PROCEDURE — 99232 SBSQ HOSP IP/OBS MODERATE 35: CPT

## 2025-04-25 PROCEDURE — 84550 ASSAY OF BLOOD/URIC ACID: CPT

## 2025-04-25 PROCEDURE — 85025 COMPLETE CBC W/AUTO DIFF WBC: CPT

## 2025-04-25 PROCEDURE — 97530 THERAPEUTIC ACTIVITIES: CPT

## 2025-04-25 PROCEDURE — 81001 URINALYSIS AUTO W/SCOPE: CPT

## 2025-04-25 PROCEDURE — 99232 SBSQ HOSP IP/OBS MODERATE 35: CPT | Performed by: STUDENT IN AN ORGANIZED HEALTH CARE EDUCATION/TRAINING PROGRAM

## 2025-04-25 PROCEDURE — 99233 SBSQ HOSP IP/OBS HIGH 50: CPT | Performed by: PHYSICIAN ASSISTANT

## 2025-04-25 PROCEDURE — 80204 DRUG ASSAY METHOTREXATE: CPT

## 2025-04-25 PROCEDURE — 80053 COMPREHEN METABOLIC PANEL: CPT

## 2025-04-25 PROCEDURE — 97116 GAIT TRAINING THERAPY: CPT

## 2025-04-25 PROCEDURE — 97130 THER IVNTJ EA ADDL 15 MIN: CPT

## 2025-04-25 PROCEDURE — 83615 LACTATE (LD) (LDH) ENZYME: CPT

## 2025-04-25 RX ORDER — WATER 10 ML/10ML
INJECTION INTRAMUSCULAR; INTRAVENOUS; SUBCUTANEOUS
Status: COMPLETED
Start: 2025-04-25 | End: 2025-04-25

## 2025-04-25 RX ORDER — SODIUM CHLORIDE, SODIUM LACTATE, POTASSIUM CHLORIDE, CALCIUM CHLORIDE 600; 310; 30; 20 MG/100ML; MG/100ML; MG/100ML; MG/100ML
75 INJECTION, SOLUTION INTRAVENOUS CONTINUOUS
Status: DISCONTINUED | OUTPATIENT
Start: 2025-04-25 | End: 2025-04-27

## 2025-04-25 RX ORDER — DEXAMETHASONE SODIUM PHOSPHATE 4 MG/ML
4 INJECTION, SOLUTION INTRA-ARTICULAR; INTRALESIONAL; INTRAMUSCULAR; INTRAVENOUS; SOFT TISSUE 2 TIMES DAILY
Status: DISCONTINUED | OUTPATIENT
Start: 2025-04-26 | End: 2025-04-26

## 2025-04-25 RX ADMIN — WATER 10 ML: 1 INJECTION INTRAMUSCULAR; INTRAVENOUS; SUBCUTANEOUS at 15:12

## 2025-04-25 RX ADMIN — HEPARIN SODIUM 5000 UNITS: 5000 INJECTION INTRAVENOUS; SUBCUTANEOUS at 23:43

## 2025-04-25 RX ADMIN — ATORVASTATIN CALCIUM 20 MG: 20 TABLET, FILM COATED ORAL at 08:24

## 2025-04-25 RX ADMIN — BISACODYL 10 MG: 10 SUPPOSITORY RECTAL at 12:00

## 2025-04-25 RX ADMIN — SODIUM CHLORIDE, SODIUM LACTATE, POTASSIUM CHLORIDE, AND CALCIUM CHLORIDE 100 ML/HR: .6; .31; .03; .02 INJECTION, SOLUTION INTRAVENOUS at 08:25

## 2025-04-25 RX ADMIN — LEUCOVORIN CALCIUM 50 MG: 25 TABLET ORAL at 05:56

## 2025-04-25 RX ADMIN — LEVETIRACETAM 500 MG: 500 TABLET, FILM COATED ORAL at 20:23

## 2025-04-25 RX ADMIN — HEPARIN SODIUM 5000 UNITS: 5000 INJECTION INTRAVENOUS; SUBCUTANEOUS at 13:03

## 2025-04-25 RX ADMIN — SODIUM CHLORIDE, SODIUM LACTATE, POTASSIUM CHLORIDE, AND CALCIUM CHLORIDE 75 ML/HR: .6; .31; .03; .02 INJECTION, SOLUTION INTRAVENOUS at 20:21

## 2025-04-25 RX ADMIN — OLANZAPINE 2.5 MG: 2.5 TABLET, FILM COATED ORAL at 08:25

## 2025-04-25 RX ADMIN — OLANZAPINE 5 MG: 10 INJECTION, POWDER, FOR SOLUTION INTRAMUSCULAR at 15:10

## 2025-04-25 RX ADMIN — ALLOPURINOL 300 MG: 300 TABLET ORAL at 08:24

## 2025-04-25 RX ADMIN — OLANZAPINE 10 MG: 10 TABLET, FILM COATED ORAL at 23:43

## 2025-04-25 RX ADMIN — INSULIN LISPRO 5 UNITS: 100 INJECTION, SOLUTION INTRAVENOUS; SUBCUTANEOUS at 23:42

## 2025-04-25 RX ADMIN — POLYETHYLENE GLYCOL 3350 17 G: 17 POWDER, FOR SOLUTION ORAL at 08:25

## 2025-04-25 RX ADMIN — Medication 6 MG: at 20:23

## 2025-04-25 RX ADMIN — WATER 10 ML: 1 INJECTION INTRAMUSCULAR; INTRAVENOUS; SUBCUTANEOUS at 13:03

## 2025-04-25 RX ADMIN — LEVETIRACETAM 500 MG: 500 TABLET, FILM COATED ORAL at 08:24

## 2025-04-25 RX ADMIN — CHLORHEXIDINE GLUCONATE 0.12% ORAL RINSE 15 ML: 1.2 LIQUID ORAL at 08:24

## 2025-04-25 RX ADMIN — DEXAMETHASONE SODIUM PHOSPHATE 4 MG: 4 INJECTION INTRA-ARTICULAR; INTRALESIONAL; INTRAMUSCULAR; INTRAVENOUS; SOFT TISSUE at 12:00

## 2025-04-25 RX ADMIN — INSULIN LISPRO 5 UNITS: 100 INJECTION, SOLUTION INTRAVENOUS; SUBCUTANEOUS at 12:01

## 2025-04-25 RX ADMIN — SENNOSIDES 17.2 MG: 8.6 TABLET, FILM COATED ORAL at 23:43

## 2025-04-25 RX ADMIN — OLANZAPINE 5 MG: 10 INJECTION, POWDER, FOR SOLUTION INTRAMUSCULAR at 12:57

## 2025-04-25 RX ADMIN — DEXAMETHASONE SODIUM PHOSPHATE 4 MG: 4 INJECTION INTRA-ARTICULAR; INTRALESIONAL; INTRAMUSCULAR; INTRAVENOUS; SOFT TISSUE at 05:56

## 2025-04-25 RX ADMIN — INSULIN GLARGINE 10 UNITS: 100 INJECTION, SOLUTION SUBCUTANEOUS at 23:42

## 2025-04-25 RX ADMIN — HEPARIN SODIUM 5000 UNITS: 5000 INJECTION INTRAVENOUS; SUBCUTANEOUS at 05:56

## 2025-04-25 NOTE — PROGRESS NOTES
"Discussed with progress Note - Hospitalist   Name: Alexis Dennison 64 y.o. male I MRN: [id number]  Unit/Bed#: [facility] 904-01 I Date of Admission: 3/29/2025   Date of Service: 4/25/2025 I Hospital Day: 27     Assessment & Plan  Brain tumor (HCC)  Patient with development of worsening confusion, recently seen at the [facility] ED 3/24/2025 with CT head at that time with finding of brain lesion for which MRI was advised to exclude cancerous etiology.     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  Lymphoma/leukemia panel nondiagnostic  CT head on 4/3 with interval increase in ventricular caliber concerning for worsening hydrocephalus  No indication for AED per neurosurgery and holding off steroids until Dx achieved  Continue neurochecks   Neurosurgery and oncology recommended repeat high-volume LP as previous was nondiagnostic  Post LP 4/7 which was again undiagnostic.   MRI of the brain from 4/11-\"Interval enlargement of the dominant left anterior basal ganglionic mass lesion with greater surrounding vasogenic edema and mass effect. 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 Differential considerations include lymphoma, multicentric high-grade glioma, and less likely metastasis\"  S/p brain bx 4/14 by neurosurgery   Nondiagnostic LPs  Patient pulled out EVD over the weekend  Discussed with hematology oncology on plan for " Rituxan every week.  Will need to get the rehab facility does get accommodate for this in the outpatient setting.  Will need inpatient methotrexate every 2 or 4 weeks. Still finalizing treatment plan  Heme-onc on board  Discussed with case management and will be a difficult discharge due due to difficulty of placement when patient needs chemotherapy  Last rituximab dose on 4/24/2025.  Discussed with heme-onc and palliative, will start weaning down on Decadron.  Unfortunately will need to be off of restraints for 24 hours prior to discharge.  Currently still in restraints at this time  LETY (acute kidney injury) (HCC)  Lab Results   Component Value Date    CREATININE 1.77 (H) 04/25/2025    CREATININE 2.10 (H) 04/24/2025    CREATININE 2.63 (H) 04/23/2025       Lab Results   Component Value Date    EGFR 39 04/25/2025    EGFR 32 04/24/2025    EGFR 24 04/23/2025   Creatinine continues to uptrend.  Clear in the setting of hypotension episode and methotrexate toxicity  Continue IV fluids per nephrology    Encephalopathy  Worsening, patient is more lethargic today  Due to brain mass as above  Patient with acute onset confusion, forgetting names/places, oriented to self only most of the times  Delirium precautions  Patient has improving encephalopathy according to the daughter.    Type 2 diabetes mellitus with hyperglycemia, without long-term current use of insulin (HCC)  Has been well-controlled as outpatient with hemoglobin A1c of 6.6%  Hold home oral medications, start correctional insulin coverage   Accu-Cheks reviewed and acceptable  Continue hypoglycemia protocol and carb controlled diet  Mixed hyperlipidemia  Continue statin  HTN, goal below 130/80  Continue losartan and hydrochlorothiazide  BP reviewed and acceptable  Liver lesion  MRI obtained: No suspicious hepatic lesions.  There is a simple right hepatic lobe cyst.  Mild diffuse hepatic steatosis.  LFTs stable  Outpatient follow up advised  Thyroid  nodule  Incidental findings on CT scan  Nonemergent thyroid ultrasound for follow-up in the outpatient setting  Pulmonary nodule  Imaging with 4 mm right lower lobe pulmonary nodule.  CT chest 3-month follow-up  Constipation  Continue senna, MiraLAX and suppository  Ambulate patient as able  Ambulatory dysfunction  PT/OT recommending level 2 at discharge  Awaiting medically stability  Ambulate patient as able  Fall precautions  Goals of care, counseling/discussion    Palliative care encounter    Primary CNS lymphoma    Metabolic alkalosis      VTE Pharmacologic Prophylaxis: VTE Score: 5 High Risk (Score >/= 5) - Pharmacological DVT Prophylaxis Ordered: heparin. Sequential Compression Devices Ordered.    Mobility:   Basic Mobility Inpatient Raw Score: 11  JH-HLM Goal: 4: Move to chair/commode  JH-HLM Achieved: 7: Walk 25 feet or more  JH-HLM Goal NOT achieved. Continue with multidisciplinary rounding and encourage appropriate mobility to improve upon JH-HLM goals.    Patient Centered Rounds: I performed bedside rounds with nursing staff today.   Discussions with Specialists or Other Care Team Provider: Palliative, nephrology    Education and Discussions with Family / Patient: Updated  (daughter) via phone.    Current Length of Stay: 27 day(s)  Current Patient Status: Inpatient   Certification Statement: The patient will continue to require additional inpatient hospital stay due to placement.  Discharge Plan: Anticipate discharge in 24-48 hrs to rehab facility.    Code Status: Level 1 - Full Code    Subjective   No complaints at this time.    Objective :  Temp:  [97.4 °F (36.3 °C)-98.1 °F (36.7 °C)] 97.6 °F (36.4 °C)  HR:  [41-49] 44  BP: (143-148)/(69-75) 148/75  Resp:  [15-16] 16  SpO2:  [91 %-96 %] 96 %  O2 Device: None (Room air)    Body mass index is 24.67 kg/m².     Input and Output Summary (last 24 hours):     Intake/Output Summary (Last 24 hours) at 4/25/2025 1426  Last data filed at 4/25/2025  1241  Gross per 24 hour   Intake 0 ml   Output 2025 ml   Net -2025 ml       Physical Exam  Vitals and nursing note reviewed.   Constitutional:       Appearance: He is well-developed and normal weight.   HENT:      Head: Normocephalic and atraumatic.      Nose: Nose normal.      Mouth/Throat:      Mouth: Mucous membranes are moist.   Eyes:      Conjunctiva/sclera: Conjunctivae normal.   Cardiovascular:      Rate and Rhythm: Normal rate and regular rhythm.      Heart sounds: Normal heart sounds. No murmur heard.  Pulmonary:      Effort: Pulmonary effort is normal. No respiratory distress.      Breath sounds: Normal breath sounds.   Abdominal:      General: Abdomen is flat.      Palpations: Abdomen is soft.      Tenderness: There is no abdominal tenderness.   Musculoskeletal:         General: No swelling.      Cervical back: Neck supple.      Right lower leg: No edema.      Left lower leg: No edema.   Skin:     General: Skin is warm and dry.      Capillary Refill: Capillary refill takes less than 2 seconds.   Neurological:      General: No focal deficit present.      Mental Status: He is alert and oriented to person, place, and time. Mental status is at baseline.      Comments: Patient was altered since yesterday.  Needed Benadryl which caused him to have agitation episodes.  Currently still in restraints   Psychiatric:         Mood and Affect: Mood normal.           Lines/Drains:  Lines/Drains/Airways       Active Status       Name Placement date Placement time Site Days    PICC Line 04/16/25 Right Basilic 04/16/25  1526  Basilic  8    External Urinary Catheter Medium 04/24/25  1000  -- 1                    Central Line:  Goal for removal: Will discontinue when hemodynamically stable.               Lab Results: I have reviewed the following results:   Results from last 7 days   Lab Units 04/25/25  0501   WBC Thousand/uL 5.99   HEMOGLOBIN g/dL 11.6*   HEMATOCRIT % 33.5*   PLATELETS Thousands/uL 81*   SEGS PCT % 86*    LYMPHO PCT % 10*   MONO PCT % 3*   EOS PCT % 0     Results from last 7 days   Lab Units 04/25/25  0501   SODIUM mmol/L 144   POTASSIUM mmol/L 4.1   CHLORIDE mmol/L 104   CO2 mmol/L 33*   BUN mg/dL 51*   CREATININE mg/dL 1.77*   ANION GAP mmol/L 7   CALCIUM mg/dL 9.1   ALBUMIN g/dL 3.2*   TOTAL BILIRUBIN mg/dL 0.85   ALK PHOS U/L 41   ALT U/L 89*   AST U/L 38   GLUCOSE RANDOM mg/dL 166*         Results from last 7 days   Lab Units 04/25/25  1109 04/25/25  0733 04/24/25  2039 04/24/25  1626 04/24/25  1118 04/24/25  0721 04/23/25  2036 04/23/25  1610 04/23/25  1054 04/23/25  0644 04/22/25  2158 04/22/25  1639   POC GLUCOSE mg/dl 171* 145* 218* 166* 197* 156* 190* 206* 157* 111 176* 287*               Recent Cultures (last 7 days):           Last 24 Hours Medication List:     Current Facility-Administered Medications:     acetaminophen (TYLENOL) tablet 650 mg, Q6H PRN    allopurinol (ZYLOPRIM) tablet 300 mg, Daily    alteplase (CATHFLO) injection 2 mg, Q1MIN PRN    alteplase (CATHFLO) injection 2 mg, Q1MIN PRN    aluminum-magnesium hydroxide-simethicone (MAALOX) oral suspension 30 mL, Q4H PRN    atorvastatin (LIPITOR) tablet 20 mg, Daily    bisacodyl (DULCOLAX) rectal suppository 10 mg, Daily    calcium carbonate (TUMS) chewable tablet 1,000 mg, Daily PRN    chlorhexidine (PERIDEX) 0.12 % oral rinse 15 mL, Q12H CAIT    [Held by provider] Cholecalciferol (VITAMIN D3) tablet 2,000 Units, Daily    dexamethasone (DECADRON) injection 4 mg, Q6H CATI    heparin (porcine) subcutaneous injection 5,000 Units, Q8H CAIT    HYDROmorphone HCl (DILAUDID) injection 0.2 mg, Q2H PRN    insulin glargine (LANTUS) subcutaneous injection 10 Units 0.1 mL, HS    insulin lispro (HumALOG/ADMELOG) 100 units/mL subcutaneous injection 5-25 Units, TID AC **AND** Fingerstick Glucose (POCT), TID AC    insulin lispro (HumALOG/ADMELOG) 100 units/mL subcutaneous injection 5-25 Units, HS    lactated ringers infusion, Continuous, Last Rate: 75 mL/hr  (04/25/25 1111)    levETIRAcetam (KEPPRA) tablet 500 mg, Q12H CAIT    melatonin tablet 6 mg, HS    OLANZapine (ZyPREXA) IM injection 5 mg, Q8H PRN    OLANZapine (ZyPREXA) tablet 10 mg, HS    OLANZapine (ZyPREXA) tablet 2.5 mg, QAM    ondansetron (ZOFRAN) injection 4 mg, Q6H PRN    oxyCODONE (ROXICODONE) split tablet 2.5 mg, Q4H PRN **OR** oxyCODONE (ROXICODONE) IR tablet 5 mg, Q4H PRN    pantoprazole (PROTONIX) EC tablet 40 mg, Early Morning    polyethylene glycol (MIRALAX) packet 17 g, Daily    senna (SENOKOT) tablet 17.2 mg, HS    sodium chloride 0.9 % infusion, Once PRN, Last Rate: Stopped (04/17/25 2051)    sodium chloride 0.9 % infusion, Once PRN    sodium chloride 0.9 % infusion, Once PRN    Administrative Statements   Today, Patient Was Seen By: Rustam Drummond MD  I have spent a total time of 40 minutes in caring for this patient on the day of the visit/encounter including Diagnostic results, Prognosis, Risks and benefits of tx options, Instructions for management, Patient and family education, Importance of tx compliance, Risk factor reductions, Impressions, Counseling / Coordination of care, Documenting in the medical record, Reviewing/placing orders in the medical record (including tests, medications, and/or procedures), Obtaining or reviewing history  , and Communicating with other healthcare professionals .    **Please Note: This note may have been constructed using a voice recognition system.**

## 2025-04-25 NOTE — ASSESSMENT & PLAN NOTE
Admitted 3/29 with worsening confusion in the setting of recent ED visit with CTA head and neck 3/24 demonstrating multiple nonspecific hyperdense ependymal/subependymal nodules, largest located adjacent to the foramen of Monro (2 x 1.1 x 1 cm ) and f/u outpatient MRI 3/26 demonstrating multiple scattered foci of subependymal nodular enhancement throughout ventricles (left worse than right) with mild hydrocephalus (left worse than right), scattered nodular areas of enhancement in left anterior inferior basal ganglia involving left anterior commissure, and smaller foci of nodular enhancement along anteromedial aspect of left cerebral peduncle and left quirino.with concern for primary CNS malignancy  Per sister, both patient's parents with history of lymphoma  CTH on admission 3/29 stable subependymal nodules and left foramen of Monro region hyperdense lesion, dilation of left lateral ventricle and minimal left to right midline shift  4/3 imaging concerning for worsening hydrocephalus  2 non diagnostic lumbar punctures performed  MRI brain 4/11 with interval disease progression  4/13 developed worsening mentation and CTH demonstrated predominantly left sided obstructive hydrocephalus from lesion located adjacent to the foramen of Monro, now s/p left front EVD  4/14 underwent left frontal endoscopic biopsy and replacement of EVD  Preliminary pathology large B-cell lymphoma    Medical oncology following

## 2025-04-25 NOTE — OCCUPATIONAL THERAPY NOTE
Occupational Therapy Progress Note     Patient Name: Alexis Dennison  Today's Date: 4/25/2025  Problem List  Principal Problem:    Primary CNS lymphoma  Active Problems:    Type 2 diabetes mellitus with hyperglycemia, without long-term current use of insulin (HCC)    HTN, goal below 130/80    Mixed hyperlipidemia    Brain tumor (HCC)    Thyroid nodule    Pulmonary nodule    Liver lesion    Constipation    Ambulatory dysfunction    Encephalopathy    Goals of care, counseling/discussion    Palliative care encounter    LETY (acute kidney injury) (HCC)    Metabolic alkalosis       04/25/25 1206   OT Last Visit   OT Visit Date 04/25/25   Note Type   Note Type Treatment   Pain Assessment   Pain Assessment Tool FLACC   Pain Rating: FLACC (Rest) - Face 0   Pain Rating: FLACC (Rest) - Legs 0   Pain Rating: FLACC (Rest) - Activity 0   Pain Rating: FLACC (Rest) - Cry 0   Pain Rating: FLACC (Rest) - Consolability 0   Score: FLACC (Rest) 0   Pain Rating: FLACC (Activity) - Face 0   Pain Rating: FLACC (Activity) - Legs 0   Pain Rating: FLACC (Activity) - Activity 0   Pain Rating: FLACC (Activity) - Cry 0   Pain Rating: FLACC (Activity) - Consolability 0   Score: FLACC (Activity) 0   Restrictions/Precautions   Weight Bearing Precautions Per Order No   Other Precautions Impulsive;1:1;Cognitive;Chair Alarm;Bed Alarm;Restraints;Multiple lines;Fall Risk  (b/l mitt restraints, posey belt)   Lifestyle   Autonomy I w/ ADLs, I w/ IADLs, I w/ functional mobility and transfers   Reciprocal Relationships Wife and children   Service to Others Full time employment   Intrinsic Gratification Reading   Bed Mobility   Supine to Sit 5  Supervision   Additional items HOB elevated;Bedrails;Impulsive;Verbal cues   Sit to Supine 5  Supervision   Additional items HOB elevated;Bedrails;Impulsive;Verbal cues   Additional Comments Pt supine in bed at end of OT tx session w/ posey belt intact, alarm activated, and all needs within reach.   Transfers   Sit to  Stand 4  Minimal assistance   Additional items Assist x 1;Increased time required;Impulsive;Verbal cues   Stand to Sit 4  Minimal assistance   Additional items Assist x 1;Increased time required;Impulsive;Verbal cues   Additional Comments multiple STS transfers during session w/ HHA for support/safety   Functional Mobility   Functional Mobility 4  Minimal assistance   Additional Comments Pt community distance w/ min Ax2 using b/l HHA for support and safety; impulsive and decreased safety awareness.   Additional items Hand hold assistance   Cognition   Overall Cognitive Status Impaired   Arousal/Participation Alert;Responsive;Cooperative   Attention Attends with cues to redirect   Memory Decreased recall of precautions;Decreased recall of recent events;Decreased short term memory   Following Commands Follows one step commands with increased time or repetition   Comments Pt was impulsive t/o session; sometimes irritable but re-directable w/ cues. Pt also demonstrates decreased safety awareness and insight info deficits; benefits best from one-step directions.   Additional Activities   Additional Activities Other (Comment)  (tabletop games to challenge cogniton and attention)   Additional Activities Comments Pt participated in tabletop card games to challenge cognition and attention to task. Pt sorted cards by color; attended to task for ~15 minutes w/ min-mod verbal cues for re-direction.   Activity Tolerance   Activity Tolerance Patient tolerated treatment well   Medical Staff Made Aware RN clearance prior to session; PTDeepthi, due to pt's medical complexity and multiple comorbidities   Assessment   Assessment Pt seen for skilled OT treatment session from 1112 to 1206 w/ interventions focusing on ADL participation, activity tolerance, sitting tolerance, sitting balance, standing tolerance, standing balance, bed mobility , transfer skills, fxnl mobility, and cognition . Pt was agreeable and willing to participate in  session. Pt engaged in the following tasks: S for bed mobility, min Ax1 for transfers, and min Ax2 for fxnl mobility w/ b/l HHA for support. Pt also participated in tabletop card games to challenge cognition and attention to task. Pt sorted cards by color; attended to task for ~15 minutes w/ min-mod verbal cues for re-direction. In comparison to previous session, pt demonstrated improvements in mobility tasks as he required less physical assistance for bed mobility today. Pt required frequent cueing for safety and attention to task t/o session due to impaired cognition and impulsiveness. Pt continues to be functioning below baseline level as occupational performance remains limited by decreased ADL status, decreased activity tolerance, decreased endurance, decreased sitting tolerance, decreased sitting balance, decreased standing tolerance, decreased standing balance, decreased transfer skills, decreased fxnl mobility, decreased safety awareness, decreased insight into deficits, and impaired cognition . From OT standpoint, recommend Level I (Maximum Resource Intensity) at time of d/c. Pt will benefit from continued OT treatment while in acute care to address deficits as defined above and maximize level of functional independence with ADLs and functional mobility. Pt supine in bed w/ alarm activated, posey belt intact, and all needs met at end of session.   Plan   Treatment Interventions ADL retraining;Functional transfer training;Endurance training;Cognitive reorientation;Patient/family training;Equipment evaluation/education;Neuromuscular reeducation;Fine motor coordination activities;Compensatory technique education;Continued evaluation;Energy conservation;Activityengagement   Goal Expiration Date 04/29/25   OT Treatment Day 7   OT Frequency 2-3x/wk   Discharge Recommendation   Rehab Resource Intensity Level, OT I (Maximum Resource Intensity)   AM-PAC Daily Activity Inpatient   Lower Body Dressing 2   Bathing 2    Toileting 2   Upper Body Dressing 2   Grooming 3   Eating 3   Daily Activity Raw Score 14   Daily Activity Standardized Score (Calc for Raw Score >=11) 33.39   AM-PAC Applied Cognition Inpatient   Following a Speech/Presentation 1   Understanding Ordinary Conversation 3   Taking Medications 2   Remembering Where Things Are Placed or Put Away 2   Remembering List of 4-5 Errands 1   Taking Care of Complicated Tasks 1   Applied Cognition Raw Score 10   Applied Cognition Standardized Score 24.98       The patient's raw score on the AM-PAC Daily Activity Inpatient Short Form is 14. A raw score of less than 19 suggests the patient may benefit from discharge to post-acute rehabilitation services. Please refer to the recommendation of the Occupational Therapist for safe discharge planning.    YAMIL Medellin, OTR/L

## 2025-04-25 NOTE — ASSESSMENT & PLAN NOTE
Status post methotrexate  Continue sodium bicarbonate drip to avoid crystallization and worsening LETY  Continue to be confused, 1:1

## 2025-04-25 NOTE — PLAN OF CARE
Problem: PHYSICAL THERAPY ADULT  Goal: Performs mobility at highest level of function for planned discharge setting.  See evaluation for individualized goals.  Description: Treatment/Interventions: LE strengthening/ROM, Functional transfer training, Elevations, Therapeutic exercise, Cognitive reorientation, Endurance training, Bed mobility, Equipment eval/education, Patient/family training, Gait training, Spoke to nursing, Spoke to case management, OT, Continued evaluation, Compensatory technique education, Family          See flowsheet documentation for full assessment, interventions and recommendations.  4/25/2025 1620 by Deepthi Granados, PT  Outcome: Progressing

## 2025-04-25 NOTE — ASSESSMENT & PLAN NOTE
Lab Results   Component Value Date    CREATININE 1.77 (H) 04/25/2025    CREATININE 2.10 (H) 04/24/2025    CREATININE 2.63 (H) 04/23/2025       Lab Results   Component Value Date    EGFR 39 04/25/2025    EGFR 32 04/24/2025    EGFR 24 04/23/2025   Creatinine continues to uptrend.  Clear in the setting of hypotension episode and methotrexate toxicity  Continue IV fluids per nephrology

## 2025-04-25 NOTE — ASSESSMENT & PLAN NOTE
64 yoM with PMHx of DM2, NAFLD, and HT who presented with progressive encephalopathy found to have multiple scattered nodular cerebral and cerebellar enhancement who underwent two non-diagnostic LPs prompting stereotactic brain biopsy (4/14) which showed large B-cell lymphoma.  See oncology progress note on 4/21/2025 for full diagnostic work up.    Plan:  High-dose methotrexate (8 g/m²) every 2 weeks (C1 received 4/18, C2 planned 5/1), may consider regimen with every 4 week administration which would be more conducive to rehab  Methotrexate levels 5.8 (24 hours) > 0.6 (48 hours) > .45 (72 hours) > .16 (4/23) > .10 (4/24), we expect his level to be therapeutic at this time which is roughly 14 hours after his last MTX level, discontinued leucovorin, Q6H UA, and changed HCO3 to  mL/hr  Rituximab (375 mg/m²) weekly x8 weeks  Appreciate nephrology managing renal function which is improving  Final path is pending from biopsy (4/14)

## 2025-04-25 NOTE — PLAN OF CARE
Problem: OCCUPATIONAL THERAPY ADULT  Goal: Performs self-care activities at highest level of function for planned discharge setting.  See evaluation for individualized goals.  Description: Treatment Interventions: ADL retraining, Functional transfer training, Visual perceptual retraining, Endurance training, Cognitive reorientation, Patient/family training, Equipment evaluation/education, Compensatory technique education, Activityengagement  Equipment Recommended:  (tbd)       See flowsheet documentation for full assessment, interventions and recommendations.   Note: Limitation: Decreased ADL status, Decreased Safe judgement during ADL, Decreased cognition, Decreased endurance, Decreased self-care trans, Decreased high-level ADLs  Prognosis: Fair  Assessment: Pt seen for skilled OT treatment session from 1112 to 1206 w/ interventions focusing on ADL participation, activity tolerance, sitting tolerance, sitting balance, standing tolerance, standing balance, bed mobility , transfer skills, fxnl mobility, and cognition . Pt was agreeable and willing to participate in session. Pt engaged in the following tasks: S for bed mobility, min Ax1 for transfers, and min Ax2 for fxnl mobility w/ b/l HHA for support. Pt also participated in tabletop card games to challenge cognition and attention to task. Pt sorted cards by color; attended to task for ~15 minutes w/ min-mod verbal cues for re-direction. In comparison to previous session, pt demonstrated improvements in mobility tasks as he required less physical assistance for bed mobility today. Pt required frequent cueing for safety and attention to task t/o session due to impaired cognition and impulsiveness. Pt continues to be functioning below baseline level as occupational performance remains limited by decreased ADL status, decreased activity tolerance, decreased endurance, decreased sitting tolerance, decreased sitting balance, decreased standing tolerance, decreased  standing balance, decreased transfer skills, decreased fxnl mobility, decreased safety awareness, decreased insight into deficits, and impaired cognition . From OT standpoint, recommend Level I (Maximum Resource Intensity) at time of d/c. Pt will benefit from continued OT treatment while in acute care to address deficits as defined above and maximize level of functional independence with ADLs and functional mobility. Pt supine in bed w/ alarm activated, posey belt intact, and all needs met at end of session.  Recommendation: Physiatry Consult  Rehab Resource Intensity Level, OT: I (Maximum Resource Intensity)

## 2025-04-25 NOTE — PLAN OF CARE
Problem: PAIN - ADULT  Goal: Verbalizes/displays adequate comfort level or baseline comfort level  Description: Interventions:- Encourage patient to monitor pain and request assistance- Assess pain using appropriate pain scale- Administer analgesics based on type and severity of pain and evaluate response- Implement non-pharmacological measures as appropriate and evaluate response- Notify physician/advanced practitioner if interventions unsuccessful or patient reports new pain  Outcome: Progressing     Problem: INFECTION - ADULT  Goal: Absence or prevention of progression during hospitalization  Description: INTERVENTIONS:- Assess and monitor for signs and symptoms of infection- Monitor lab/diagnostic results- Monitor all insertion sites, i.e. indwelling lines, tubes, and drains- Hostetter appropriate cooling/warming therapies per order- Administer medications as ordered- Instruct and encourage patient and family to use good hand hygiene technique- Identify and instruct in appropriate isolation precautions for identified infection/condition  Outcome: Progressing     Problem: SAFETY ADULT  Goal: Patient will remain free of falls  Description: INTERVENTIONS:- Educate patient/family on patient safety including physical limitations- Instruct patient to call for assistance with activity - Consult OT/PT to assist with strengthening/mobility - Keep Call bell within reach- Keep bed low and locked with side rails adjusted as appropriate- Keep care items and personal belongings within reach- Initiate and maintain comfort rounds- Make Fall Risk Sign visible to staff- Offer Toileting every 2 Hours, in advance of need- Initiate/Maintain bed/chair alarm- Obtain necessary fall risk management equipment.- Apply yellow socks and bracelet for high fall risk patients- Consider moving patient to room near nurses station  INTERVENTIONS:- Educate patient/family on patient safety including physical limitations- Instruct patient to call  for assistance with activity - Consult OT/PT to assist with strengthening/mobility - Keep Call bell within reach- Keep bed low and locked with side rails adjusted as appropriate- Keep care items and personal belongings within reach- Initiate and maintain comfort rounds- Make Fall Risk Sign visible to staff- Offer Toileting every 2 Hours, in advance of need- Initiate/Maintain bed alarm- Obtain necessary fall risk management equipment: bracelet, socks, alarms- Apply yellow socks and bracelet for high fall risk patients- Consider moving patient to room near nurses station  Outcome: Progressing  Goal: Maintain or return to baseline ADL function  Description: INTERVENTIONS:-  Assess patient's ability to carry out ADLs; assess patient's baseline for ADL function and identify physical deficits which impact ability to perform ADLs (bathing, care of mouth/teeth, toileting, grooming, dressing, etc.)- Assess/evaluate cause of self-care deficits - Assess range of motion- Assess patient's mobility; develop plan if impaired- Assess patient's need for assistive devices and provide as appropriate- Encourage maximum independence but intervene and supervise when necessary- Involve family in performance of ADLs- Assess for home care needs following discharge - Consider OT consult to assist with ADL evaluation and planning for discharge- Provide patient education as appropriate  Outcome: Progressing  Goal: Maintains/Returns to pre admission functional level  Description: INTERVENTIONS:- Perform AM-PAC 6 Click Basic Mobility/ Daily Activity assessment daily.- Set and communicate daily mobility goal to care team and patient/family/caregiver. - Collaborate with rehabilitation services on mobility goals if consulted- Reposition patient every 2 hours.- Dangle patient 3 times a day- Stand patient 3 times a day- Ambulate patient 3 times a day- Out of bed to chair 3 times a day - Out of bed for meals 3 times a day- Out of bed for toileting-  Record patient progress and toleration of activity level   Outcome: Progressing     Problem: DISCHARGE PLANNING  Goal: Discharge to home or other facility with appropriate resources  Description: INTERVENTIONS:- Identify barriers to discharge w/patient and caregiver- Arrange for needed discharge resources and transportation as appropriate- Identify discharge learning needs (meds, wound care, etc.)- Refer to Case Management Department for coordinating discharge planning if the patient needs post-hospital services based on physician/advanced practitioner order or complex needs related to functional status, cognitive ability, or social support system  Outcome: Progressing     Problem: Knowledge Deficit  Goal: Patient/family/caregiver demonstrates understanding of disease process, treatment plan, medications, and discharge instructions  Description: Complete learning assessment and assess knowledge base.Interventions:- Provide teaching at level of understanding- Provide teaching via preferred learning methods  Outcome: Progressing     Problem: NEUROSENSORY - ADULT  Goal: Achieves stable or improved neurological status  Description: INTERVENTIONS- Monitor and report changes in neurological status- Monitor vital signs such as temperature, blood pressure, glucose, and any other labs ordered - Initiate measures to prevent increased intracranial pressure- Monitor for seizure activity and implement precautions if appropriate    INTERVENTIONS- Monitor and report changes in neurological status- Monitor vital signs such as temperature, blood pressure, glucose, and any other labs ordered - Initiate measures to prevent increased intracranial pressure- Monitor for seizure activity and implement precautions if appropriate    Outcome: Progressing  Goal: Remains free of injury related to seizures activity  Description: INTERVENTIONS- Maintain airway, patient safety  and administer oxygen as ordered- Monitor patient for seizure  activity, document and report duration and description of seizure to physician/advanced practitioner- If seizure occurs,  ensure patient safety during seizure- Reorient patient post seizure- Seizure pads on all 4 side rails- Instruct patient/family to notify RN of any seizure activity including if an aura is experienced- Instruct patient/family to call for assistance with activity based on nursing assessment- Administer anti-seizure medications if ordered  INTERVENTIONS- Maintain airway, patient safety  and administer oxygen as ordered- Monitor patient for seizure activity, document and report duration and description of seizure to physician/advanced practitioner- If seizure occurs,  ensure patient safety during seizure- Reorient patient post seizure- Seizure pads on all 4 side rails- Instruct patient/family to notify RN of any seizure activity including if an aura is experienced- Instruct patient/family to call for assistance with activity based on nursing assessment- Administer anti-seizure medications if ordered  Outcome: Progressing  Goal: Achieves maximal functionality and self care  Description: INTERVENTIONS- Monitor swallowing and airway patency with patient fatigue and changes in neurological status- Encourage and assist patient to increase activity and self care. - Encourage visually impaired, hearing impaired and aphasic patients to use assistive/communication devices  INTERVENTIONS- Monitor swallowing and airway patency with patient fatigue and changes in neurological status- Encourage and assist patient to increase activity and self care. - Encourage visually impaired, hearing impaired and aphasic patients to use assistive/communication devices  Outcome: Progressing     Problem: METABOLIC, FLUID AND ELECTROLYTES - ADULT  Goal: Glucose maintained within target range  Description: INTERVENTIONS:- Monitor Blood Glucose as ordered- Assess for signs and symptoms of hyperglycemia and hypoglycemia- Administer  ordered medications to maintain glucose within target range- Assess nutritional intake and initiate nutrition service referral as needed  INTERVENTIONS:- Monitor Blood Glucose as ordered- Assess for signs and symptoms of hyperglycemia and hypoglycemia- Administer ordered medications to maintain glucose within target range- Assess nutritional intake and initiate nutrition service referral as needed  Outcome: Progressing  Goal: Electrolytes maintained within normal limits  Description: INTERVENTIONS:- Monitor labs and assess patient for signs and symptoms of electrolyte imbalances- Administer electrolyte replacement as ordered- Monitor response to electrolyte replacements, including repeat lab results as appropriate- Instruct patient on fluid and nutrition as appropriate  Outcome: Progressing  Goal: Fluid balance maintained  Description: INTERVENTIONS:- Monitor labs - Monitor I/O and WT- Instruct patient on fluid and nutrition as appropriate- Assess for signs & symptoms of volume excess or deficit  Outcome: Progressing     Problem: Prexisting or High Potential for Compromised Skin Integrity  Goal: Skin integrity is maintained or improved  Description: INTERVENTIONS:- Identify patients at risk for skin breakdown- Assess and monitor skin integrity- Assess and monitor nutrition and hydration status- Monitor labs - Assess for incontinence - Turn and reposition patient- Assist with mobility/ambulation- Relieve pressure over bony prominences- Avoid friction and shearing- Provide appropriate hygiene as needed including keeping skin clean and dry- Evaluate need for skin moisturizer/barrier cream- Collaborate with interdisciplinary team - Patient/family teaching- Consider wound care consult   Outcome: Progressing     Problem: SAFETY,RESTRAINT: NV/NON-SELF DESTRUCTIVE BEHAVIOR  Goal: Remains free of harm/injury (restraint for non violent/non self-detsructive behavior)  Description: INTERVENTIONS:- Instruct patient/family  regarding restraint use - Assess and monitor physiologic and psychological status - Provide interventions and comfort measures to meet assessed patient needs - Identify and implement measures to help patient regain control- Assess readiness for release of restraint   Outcome: Progressing  Goal: Returns to optimal restraint-free functioning  Description: INTERVENTIONS:- Assess the patient's behavior and symptoms that indicate continued need for restraint- Identify and implement measures to help patient regain control- Assess readiness for release of restraint   Outcome: Progressing     Problem: CARDIOVASCULAR - ADULT  Goal: Maintains optimal cardiac output and hemodynamic stability  Description: INTERVENTIONS:- Monitor I/O, vital signs and rhythm- Monitor for S/S and trends of decreased cardiac output- Administer and titrate ordered vasoactive medications to optimize hemodynamic stability- Assess quality of pulses, skin color and temperature- Assess for signs of decreased coronary artery perfusion- Instruct patient to report change in severity of symptoms  Outcome: Progressing  Goal: Absence of cardiac dysrhythmias or at baseline rhythm  Description: INTERVENTIONS:- Continuous cardiac monitoring, vital signs, obtain 12 lead EKG if ordered- Administer antiarrhythmic and heart rate control medications as ordered- Monitor electrolytes and administer replacement therapy as ordered  Outcome: Progressing     Problem: GASTROINTESTINAL - ADULT  Goal: Maintains or returns to baseline bowel function  Description: INTERVENTIONS:- Assess bowel function- Encourage oral fluids to ensure adequate hydration- Administer IV fluids if ordered to ensure adequate hydration- Administer ordered medications as needed- Encourage mobilization and activity- Consider nutritional services referral to assist patient with adequate nutrition and appropriate food choices  Outcome: Progressing  Goal: Maintains adequate nutritional  intake  Description: INTERVENTIONS:- Monitor percentage of each meal consumed- Identify factors contributing to decreased intake, treat as appropriate- Assist with meals as needed- Monitor I&O, weight, and lab values if indicated- Obtain nutrition services referral as needed  Outcome: Progressing     Problem: GENITOURINARY - ADULT  Goal: Maintains or returns to baseline urinary function  Description: INTERVENTIONS:- Assess urinary function- Encourage oral fluids to ensure adequate hydration if ordered- Administer IV fluids as ordered to ensure adequate hydration- Administer ordered medications as needed- Offer frequent toileting- Follow urinary retention protocol if ordered  Outcome: Progressing  Goal: Absence of urinary retention  Description: INTERVENTIONS:- Assess patient’s ability to void and empty bladder- Monitor I/O- Bladder scan as needed- Discuss with physician/AP medications to alleviate retention as needed- Discuss catheterization for long term situations as appropriate  Outcome: Progressing     Problem: SKIN/TISSUE INTEGRITY - ADULT  Goal: Skin Integrity remains intact(Skin Breakdown Prevention)  Description: Assess:-Perform [place] assessment every shift-Clean and moisturize skin every 2-Inspect skin when repositioning, toileting, and assisting with ADLS-Assess under medical devices such as restraints every 2-Assess extremities for adequate circulation and sensation Bed Management:-Have minimal linens on bed & keep smooth, unwrinkled-Change linens as needed when moist or perspiring-Avoid sitting or lying in one position for more than 2 hours while in bed-Keep HOB at 30degrees Toileting:-Offer bedside commode-Assess for incontinence every 2-Use incontinent care products after each incontinent episode such as proper cleaning equipment Activity:-Mobilize patient 3 times a day-Encourage activity and walks on unit-Encourage or provide ROM exercises -Turn and reposition patient every 2 Hours-Use appropriate  equipment to lift or move patient in bed-Instruct/ Assist with weight shifting every 2 hours  when out of bed in chair-Consider limitation of chair time 2 hour intervalsSkin Care:-Avoid use of baby powder, tape, friction and shearing, hot water or constrictive clothing-Relieve pressure over bony prominences using 2-Do not massage red bony areasNext Steps:-Teach patient strategies to minimize risks such as pressure injuries -  Outcome: Progressing  Goal: Incision(s), wounds(s) or drain site(s) healing without S/S of infection  Description: INTERVENTIONS- Assess and document dressing, incision, wound bed, drain sites and surrounding tissue- Provide patient and family education- Perform skin care/dressing changes as needed  Outcome: Progressing     Problem: HEMATOLOGIC - ADULT  Goal: Maintains hematologic stability  Description: INTERVENTIONS- Assess for signs and symptoms of bleeding or hemorrhage- Monitor labs- Administer supportive blood products/factors as ordered and appropriate  Outcome: Progressing     Problem: MUSCULOSKELETAL - ADULT  Goal: Maintain or return mobility to safest level of function  Description: INTERVENTIONS:- Assess patient's ability to carry out ADLs; assess patient's baseline for ADL function and identify physical deficits which impact ability to perform ADLs (bathing, care of mouth/teeth, toileting, grooming, dressing, etc.)- Assess/evaluate cause of self-care deficits - Assess range of motion- Assess patient's mobility- Assess patient's need for assistive devices and provide as appropriate- Encourage maximum independence but intervene and supervise when necessary- Involve family in performance of ADLs- Assess for home care needs following discharge - Consider OT consult to assist with ADL evaluation and planning for discharge- Provide patient education as appropriate  Outcome: Progressing     Problem: Nutrition/Hydration-ADULT  Goal: Nutrient/Hydration intake appropriate for improving,  restoring or maintaining nutritional needs  Description: Monitor and assess patient's nutrition/hydration status for malnutrition. Collaborate with interdisciplinary team and initiate plan and interventions as ordered.  Monitor patient's weight and dietary intake as ordered or per policy. Utilize nutrition screening tool and intervene as necessary. Determine patient's food preferences and provide high-protein, high-caloric foods as appropriate. INTERVENTIONS:- Monitor oral intake, urinary output, labs, and treatment plans- Assess nutrition and hydration status and recommend course of action- Evaluate amount of meals eaten- Assist patient with eating if necessary - Allow adequate time for meals- Recommend/ encourage appropriate diets, oral nutritional supplements, and vitamin/mineral supplements- Order, calculate, and assess calorie counts as needed- Recommend, monitor, and adjust tube feedings and TPN/PPN based on assessed needs- Assess need for intravenous fluids- Provide specific nutrition/hydration education as appropriate- Include patient/family/caregiver in decisions related to nutrition  Outcome: Progressing     Problem: COPING  Goal: Pt/Family able to verbalize concerns and demonstrate effective coping strategies  Description: INTERVENTIONS:- Assist patient/family to identify coping skills, available support systems and cultural and spiritual values- Provide emotional support, including active listening and acknowledgement of concerns of patient and caregivers- Reduce environmental stimuli, as able- Provide patient education- Assess for spiritual pain/suffering and initiate spiritual care, including notification of Pastoral Care or lucie based community as needed- Assess effectiveness of coping strategies  Outcome: Progressing  Goal: Will report anxiety at manageable levels  Description: INTERVENTIONS:- Administer medication as ordered- Teach and encourage coping skills- Provide emotional support- Assess  patient/family for anxiety and ability to cope  Outcome: Progressing     Problem: Potential for Falls  Goal: Patient will remain free of falls  Description: INTERVENTIONS:- Educate patient/family on patient safety including physical limitations- Instruct patient to call for assistance with activity - Consult OT/PT to assist with strengthening/mobility - Keep Call bell within reach- Keep bed low and locked with side rails adjusted as appropriate- Keep care items and personal belongings within reach- Initiate and maintain comfort rounds- Make Fall Risk Sign visible to staff- Offer Toileting every 2 Hours, in advance of need- Initiate/Maintain bed/chair alarm- Obtain necessary fall risk management equipment.- Apply yellow socks and bracelet for high fall risk patients- Consider moving patient to room near nurses station  INTERVENTIONS:- Educate patient/family on patient safety including physical limitations- Instruct patient to call for assistance with activity - Consult OT/PT to assist with strengthening/mobility - Keep Call bell within reach- Keep bed low and locked with side rails adjusted as appropriate- Keep care items and personal belongings within reach- Initiate and maintain comfort rounds- Make Fall Risk Sign visible to staff- Offer Toileting every 2 Hours, in advance of need- Initiate/Maintain bed alarm- Obtain necessary fall risk management equipment: bracelet, socks, alarms- Apply yellow socks and bracelet for high fall risk patients- Consider moving patient to room near nurses station  Outcome: Progressing

## 2025-04-25 NOTE — ASSESSMENT & PLAN NOTE
#Non-Oliguric severe KDIGO LETY stage 3 with evidence of kidney recovery  Etiology: Multifactorial likely secondary to hemodynamic changes  Secondary to hemodynamic changes  Baseline creatinine 0.93 mg/dL  Peak creatinine: 2.7 mg/dL  Current creatinine: 1.7 mg/dL, continue to improve   UA: Microhematuria, leukocyturia  Renal imaging : No hydronephrosis  Treatment:  No indication of dialysis, kidney function improving   Avoid hypotension, no NSAIDs, no IV contrast   Recommend discontinue IV fluids in the next 48 hours if patient is eating and drinking well

## 2025-04-25 NOTE — [facility] ADMISSION
Upon review of case with [facility] team including management and PM&R consultation group we anticipate patient will be appropriate for [facility] pending  medical readiness ( including off restraints for at least 24 Hrs / off decadron IV / off Zyprexa IV  / off Dilaudid IV  / preferred off of 1:1 ) / continued functional need /insurance auth /  bed avail  - at this point we are anticipating from [facility] end to approve auth submission with insurance mid next week Wednesday vs Thursday.

## 2025-04-25 NOTE — ASSESSMENT & PLAN NOTE
In the setting of the above  On exam, he is confused but interactive. Oriented to name only, does not recall , disoriented to all else.  EVD dislodged due to  agitation while admitted to ICU but did not need to be replaced secondary to stable ventricle size  Remains on UE mits/restraints, wean as tolerated. Olanzapine as above

## 2025-04-25 NOTE — PROGRESS NOTES
Progress Note - Nephrology   Name: Alexis Dennison 64 y.o. male I MRN: [id number]  Unit/Bed#: [facility] 904-01 I Date of Admission: 3/29/2025   Date of Service: 4/25/2025 I Hospital Day: 27     Assessment & Plan  LETY (acute kidney injury) (HCC)  #Non-Oliguric severe KDIGO LETY stage 3 with evidence of kidney recovery  Etiology: Multifactorial likely secondary to hemodynamic changes  Secondary to hemodynamic changes  Baseline creatinine 0.93 mg/dL  Peak creatinine: 2.7 mg/dL  Current creatinine: 1.7 mg/dL, continue to improve   UA: Microhematuria, leukocyturia  Renal imaging : No hydronephrosis  Treatment:  No indication of dialysis, kidney function improving   Avoid hypotension, no NSAIDs, no IV contrast   Recommend discontinue IV fluids in the next 48 hours if patient is eating and drinking well    HTN, goal below 130/80  Volume: Euvolemic on exam  Blood pressure: Borderline hypertension, /65  Recommend:  On LR 75 mL/h  Primary CNS lymphoma  Status post methotrexate  Continue sodium bicarbonate drip to avoid crystallization and worsening LETY  Continue to be confused, 1:1    Metabolic alkalosis  serum HCO3 33mmol/L  Off   Agree with LR      I have reviewed the nephrology recommendations including LETY improving, continue with IV fluids, discontinue LR if patient can eat and drink in the next 48 hours with primary team, and we are in agreement with renal plan including the information outlined above. Nephrology service signing off.    Subjective   Brief History of Admission - 65 yo ma with PMH of CNS lymphoma p/w confusion.  Nephrology is consulted for management of LETY     Patient is confused, continue to be 1:1.     Objective :  Temp:  [97.4 °F (36.3 °C)-98.1 °F (36.7 °C)] 97.6 °F (36.4 °C)  HR:  [41-49] 44  BP: (143-148)/(69-75) 148/75  Resp:  [15-16] 16  SpO2:  [91 %-96 %] 96 %  O2 Device: None (Room air)    Current Weight: Weight - Scale: 78 kg (171 lb 15.3 oz)  First Weight: Weight - Scale: 86.2 kg (190 lb)  I/O          04/23 0701  04/24 0700 04/24 0701  04/25 0700 04/25 0701  04/26 0700    P.O. 470 120     Total Intake(mL/kg) 470 (6.5) 120 (1.5)     Urine (mL/kg/hr) 2750 (1.6) 2250 (1.2)     Total Output 2750 2250     Net -2280 -2130            Unmeasured Urine Occurrence  1 x           Physical Exam  General:  no acute distress at this time  Skin:  No acute rash  Eyes:  No scleral icterus and noninjected  ENT:  mucous membranes moist  Neck:  no carotid bruits  Chest:  Clear to auscultation percussion, good respiratory effort, no use of accessory respiratory muscles  CVS:  Regular rate and rhythm without rub   Abdomen:  soft and nontender   Extremities: no significant lower extremity edema  Neuro:  No gross focality  Psych: Confused, agitated    Medications:    Current Facility-Administered Medications:     acetaminophen (TYLENOL) tablet 650 mg, 650 mg, Oral, Q6H PRN, Rustam Drummond MD    allopurinol (ZYLOPRIM) tablet 300 mg, 300 mg, Oral, Daily, Rustam Drummond MD, 300 mg at 04/25/25 0824    alteplase (CATHFLO) injection 2 mg, 2 mg, Intracatheter, Q1MIN PRN, Rustam Drummond MD    alteplase (CATHFLO) injection 2 mg, 2 mg, Intracatheter, Q1MIN PRN, Eva Ortiz MD    aluminum-magnesium hydroxide-simethicone (MAALOX) oral suspension 30 mL, 30 mL, Oral, Q4H PRN, Rustam Drummond MD    atorvastatin (LIPITOR) tablet 20 mg, 20 mg, Oral, Daily, Rustam Drummond MD, 20 mg at 04/25/25 0824    bisacodyl (DULCOLAX) rectal suppository 10 mg, 10 mg, Rectal, Daily, Rustam Drummond MD, 10 mg at 04/24/25 0836    calcium carbonate (TUMS) chewable tablet 1,000 mg, 1,000 mg, Oral, Daily PRN, Rustam Drummond MD    chlorhexidine (PERIDEX) 0.12 % oral rinse 15 mL, 15 mL, Mouth/Throat, Q12H CAIT, Rustam Drummond MD, 15 mL at 04/25/25 0824    [Held by provider] Cholecalciferol (VITAMIN D3) tablet 2,000 Units, 2,000 Units, Oral, Daily, Rustam Drummond MD, 2,000 Units at 04/14/25  0810    dexamethasone (DECADRON) injection 4 mg, 4 mg, Intravenous, Q6H CAIT, Rustam Drummond MD, 4 mg at 04/25/25 0556    heparin (porcine) subcutaneous injection 5,000 Units, 5,000 Units, Subcutaneous, Q8H CAIT, Rustam Drummond MD, 5,000 Units at 04/25/25 0556    HYDROmorphone HCl (DILAUDID) injection 0.2 mg, 0.2 mg, Intravenous, Q2H PRN, Rustam Drummond MD    insulin glargine (LANTUS) subcutaneous injection 10 Units 0.1 mL, 10 Units, Subcutaneous, HS, Rustam Drummond MD, 10 Units at 04/24/25 2117    insulin lispro (HumALOG/ADMELOG) 100 units/mL subcutaneous injection 5-25 Units, 5-25 Units, Subcutaneous, TID AC, 5 Units at 04/24/25 1721 **AND** Fingerstick Glucose (POCT), , , TID AC, Rustam Drummond MD    insulin lispro (HumALOG/ADMELOG) 100 units/mL subcutaneous injection 5-25 Units, 5-25 Units, Subcutaneous, HS, Rustam Drummond MD, 10 Units at 04/24/25 2118    lactated ringers infusion, 75 mL/hr, Intravenous, Continuous, Joselyn Reyes Bahamonde, MD, Last Rate: 75 mL/hr at 04/25/25 1111, 75 mL/hr at 04/25/25 1111    levETIRAcetam (KEPPRA) tablet 500 mg, 500 mg, Oral, Q12H CAIT, Rustam Drummond MD, 500 mg at 04/25/25 0824    melatonin tablet 6 mg, 6 mg, Oral, HS, Rustam Drummond MD, 6 mg at 04/24/25 2122    OLANZapine (ZyPREXA) IM injection 5 mg, 5 mg, Intramuscular, Q8H PRN, Rustam Drummond MD, 5 mg at 04/24/25 1749    OLANZapine (ZyPREXA) tablet 10 mg, 10 mg, Oral, HS, Rylie Stapleton PA-C, 10 mg at 04/24/25 2117    OLANZapine (ZyPREXA) tablet 2.5 mg, 2.5 mg, Oral, QAM, Rylie Stapleton PA-C, 2.5 mg at 04/25/25 0825    ondansetron (ZOFRAN) injection 4 mg, 4 mg, Intravenous, Q6H PRN, Rustam Drummond MD, 4 mg at 04/14/25 0930    oxyCODONE (ROXICODONE) split tablet 2.5 mg, 2.5 mg, Oral, Q4H PRN **OR** oxyCODONE (ROXICODONE) IR tablet 5 mg, 5 mg, Oral, Q4H PRN, Rustam Drummond MD    pantoprazole (PROTONIX) EC tablet 40 mg, 40 mg,  "Oral, Early Morning, Rustam Drummond MD, 40 mg at 04/24/25 0520    polyethylene glycol (MIRALAX) packet 17 g, 17 g, Oral, Daily, Rustam Drummond MD, 17 g at 04/25/25 0825    senna (SENOKOT) tablet 17.2 mg, 2 tablet, Oral, HS, Rustam Drummond MD, 17.2 mg at 04/24/25 2117    sodium chloride 0.9 % infusion, 20 mL/hr, Intravenous, Once PRN, Rustam Drummond MD, Stopped at 04/17/25 2051    sodium chloride 0.9 % infusion, 20 mL/hr, Intravenous, Once PRN, Rustam Drummond MD    sodium chloride 0.9 % infusion, 20 mL/hr, Intravenous, Once PRN, Eva Ortiz MD      Lab Results: I have reviewed the following results:  Results from last 7 days   Lab Units 04/25/25  0501 04/24/25  0515 04/23/25  0625 04/22/25  0515 04/21/25  0439 04/21/25  0437 04/20/25  0534 04/19/25  1821 04/19/25  0719 04/19/25  0528   WBC Thousand/uL 5.99 6.33  --  10.29* 11.00*  --  7.23  --  10.29*  --    HEMOGLOBIN g/dL 11.6* 11.6*  --  14.7 15.2  --  13.6  --  13.5  --    HEMATOCRIT % 33.5* 34.8*  --  41.1 41.7  --  37.3  --  36.0*  --    PLATELETS Thousands/uL 81* 110*  --  154 142*  --  106*  --  87*  --    POTASSIUM mmol/L 4.1 4.3 4.4 4.2 3.5  --  3.5 3.9  --  4.9   CHLORIDE mmol/L 104 106 103 96 98  --  95* 92*  --  95*   CO2 mmol/L 33* 31 34* 35* 32  --  34* 30  --  30   BUN mg/dL 51* 69* 63* 55* 37*  --  20 22  --  19   CREATININE mg/dL 1.77* 2.10* 2.63* 2.77* 1.76*  --  0.93 0.86  --  0.87   CALCIUM mg/dL 9.1 9.2 8.9 9.1 9.2  --  8.8 8.3*  --  8.8   MAGNESIUM mg/dL  --   --   --  2.7 2.6  --  2.2  --   --  2.2   PHOSPHORUS mg/dL 4.4* 5.1* 5.3* 5.5*  --  6.2* 3.9  --   --  3.7   ALBUMIN g/dL 3.2* 3.3* 3.3*  --   --   --   --   --   --   --        Administrative Statements     Portions of the record may have been created with voice recognition software. Occasional wrong word or \"sound a like\" substitutions may have occurred due to the inherent limitations of voice recognition software. Read the chart carefully " and recognize, using context, where substitutions have occurred.If you have any questions, please contact the dictating provider.

## 2025-04-25 NOTE — PHYSICAL THERAPY NOTE
PHYSICAL THERAPY NOTE          Patient Name: Alexis Dennison  Today's Date: 4/25/2025 04/25/25 1207   PT Last Visit   PT Visit Date 04/25/25   Note Type   Note Type Treatment   Pain Assessment   Pain Assessment Tool FLACC   Pain Rating: FLACC (Rest) - Face 0   Pain Rating: FLACC (Rest) - Legs 0   Pain Rating: FLACC (Rest) - Activity 0   Pain Rating: FLACC (Rest) - Cry 0   Pain Rating: FLACC (Rest) - Consolability 0   Score: FLACC (Rest) 0   Pain Rating: FLACC (Activity) - Face 0   Pain Rating: FLACC (Activity) - Legs 0   Pain Rating: FLACC (Activity) - Activity 0   Pain Rating: FLACC (Activity) - Cry 0   Pain Rating: FLACC (Activity) - Consolability 0   Score: FLACC (Activity) 0   Restrictions/Precautions   Weight Bearing Precautions Per Order No   Other Precautions Impulsive;1:1;Cognitive;Chair Alarm;Bed Alarm;Restraints;Multiple lines;Fall Risk  (b/l mitts, posey belt)   General   Chart Reviewed Yes   Response to Previous Treatment Patient unable to report, no changes reported from family or staff   Family/Caregiver Present Yes  (spouse)   Cognition   Overall Cognitive Status Impaired   Arousal/Participation Alert;Responsive;Cooperative   Attention Attends with cues to redirect   Memory Decreased recall of precautions;Decreased recall of recent events;Decreased short term memory   Following Commands Follows one step commands with increased time or repetition   Comments pt very impulsive t/o session, somewhat irritbale/ flat affect but redirectable with firm cues. Benefits best from one-step commands as well as visual demonstration. Overall decreased safety awareness + insight   Bed Mobility   Supine to Sit 5  Supervision   Additional items HOB elevated;Bedrails;Increased time required;Verbal cues  (ax1 for safety + line management)   Sit to Supine 5  Supervision   Additional items HOB elevated;Bedrails;Increased time  required;Verbal cues;Assist x 1  (ax1 for safety + line management)   Transfers   Sit to Stand 4  Minimal assistance   Additional items Assist x 1;Increased time required;Verbal cues   Stand to Sit 4  Minimal assistance   Additional items Assist x 1;Increased time required;Verbal cues   Additional Comments transfers with HHA, very impulsive + quick moveing. Often wrapped up in IV lines  (completes >6x STS t/o session)   Ambulation/Elevation   Gait pattern Excessively slow;Short stride;Foward flexed;Inconsistent maria esther;Decreased foot clearance   Gait Assistance 4  Minimal assist   Additional items Assist x 2;Tactile cues;Verbal cues  (+ SBA of another for line management)   Assistive Device Other (Comment)  (HHA)   Distance 100'+100'+100'  (occasional standing rest and re-direction, leaning at windowsill for breaks)   Stair Management Assistance Not tested   Ambulation/Elevation Additional Comments unsteady at times with multiple stumbles requiring min- mod A to correct.   Balance   Static Sitting Fair   Dynamic Sitting Fair   Static Standing Fair -   Dynamic Standing Poor +   Ambulatory Poor   Endurance Deficit   Endurance Deficit Yes   Endurance Deficit Description impaired cognition, generalized weakness + fatigue   Activity Tolerance   Activity Tolerance Other (Comment)  (impaired cognition)   Medical Staff Made Aware OT yolanda; co-session completed this date 2* increased medical complexity and multiple co-morbidities   Nurse Made Aware RN cleared   Exercises   Balance training  pt sitting EOB participating in card sorting game ~15-20 min   Assessment   Prognosis Fair   Problem List Decreased strength;Decreased endurance;Decreased mobility;Decreased coordination;Impaired balance;Decreased cognition;Impaired judgement;Decreased safety awareness   Assessment Pt seen for PT treatment session this date. Therapy session focused on bed mobility, functional  in order to improve overall mobility and independence. Pt  requires assist of 2 for safety for all mobility performed this date. Pt making slow but steady progress toward goals, limited by cognition. is redirectable with firm cuing, can get irritable./ mildly agitated however does calm quickly. Participated in seated card game as well as long distance ambualtion. Pt was left supine in bed with alarm on at the end of PT session with all needs in reach. Pt would benefit from continued PT services while in hospital to address remaining limitations. PT to continue to follow pt and recommends level 1. The patient's [facility] Basic Mobility Inpatient Short Form Raw Score is 13. A Raw score of less than or equal to 16 suggests the patient may benefit from discharge to post-acute rehabilitation services. Please also refer to the recommendation of the Physical Therapist for safe discharge planning.   Barriers to Discharge Inaccessible home environment;Decreased caregiver support   Goals   Patient Goals to walk   STG Expiration Date 05/09/25  (extend POC, goals remain appropriate)   PT Treatment Day 7   Plan   Treatment/Interventions LE strengthening/ROM;Functional transfer training;ADL retraining;Elevations;Therapeutic exercise;Endurance training;Patient/family training;Cognitive reorientation;Equipment eval/education;Bed mobility;Gait training;Spoke to nursing;Spoke to case management;OT   Progress Slow progress, cognitive deficits   PT Frequency 3-5x/wk   Discharge Recommendation   Rehab Resource Intensity Level, PT I (Maximum Resource Intensity)   AM-PAC Basic Mobility Inpatient   Turning in Flat Bed Without Bedrails 3   Lying on Back to Sitting on Edge of Flat Bed Without Bedrails 3   Moving Bed to Chair 2   Standing Up From Chair Using Arms 2   Walk in Room 2   Climb 3-5 Stairs With Railing 1   Basic Mobility Inpatient Raw Score 13   Basic Mobility Standardized Score 33.99   [facility] Highest Level Of Mobility   -[facility] Goal 4: Move to chair/commode   -HL Achieved 8: Walk 250  feet ot more   Education   Education Provided Mobility training   Patient Reinforcement needed   End of Consult   Patient Position at End of Consult Supine;Bed/Chair alarm activated;All needs within reach     Deepthi Granados, PT, DPT

## 2025-04-25 NOTE — ASSESSMENT & PLAN NOTE
Volume: Euvolemic on exam  Blood pressure: Borderline hypertension, /65  Recommend:  On LR 75 mL/h

## 2025-04-25 NOTE — PROGRESS NOTES
Progress Note - Oncology-Medical   Name: Alexis Dennison 64 y.o. male I MRN: [id number]  Unit/Bed#: [facility] 904-01 I Date of Admission: 3/29/2025   Date of Service: 4/25/2025 I Hospital Day: 27    Assessment & Plan  Primary CNS lymphoma  64 yoM with PMHx of DM2, NAFLD, and HT who presented with progressive encephalopathy found to have multiple scattered nodular cerebral and cerebellar enhancement who underwent two non-diagnostic LPs prompting stereotactic brain biopsy (4/14) which showed large B-cell lymphoma.  See oncology progress note on 4/21/2025 for full diagnostic work up.    Plan:  High-dose methotrexate (8 g/m²) every 2 weeks (C1 received 4/18, C2 planned 5/1), may consider regimen with every 4 week administration which would be more conducive to rehab  Methotrexate levels 5.8 (24 hours) > 0.6 (48 hours) > .45 (72 hours) > .16 (4/23) > .10 (4/24), we expect his level to be therapeutic at this time which is roughly 14 hours after his last MTX level, discontinued leucovorin, Q6H UA, and changed HCO3 to  mL/hr  Rituximab (375 mg/m²) weekly x8 weeks  Appreciate nephrology managing renal function which is improving  Final path is pending from biopsy (4/14)  Pulmonary nodule  CTA 3/29 with nonspecific 4 mm RLL pulm nodule, recommendation for 3 month follow up  Liver lesion  CTA 3/29 with nonspecific 12mm hypodense right hepatic lesion, recommended MRI abdomen  MRI 3/30 with no suspicious hepatic lesions, demonstrated simple right hepatic lobe cyst  LETY (acute kidney injury) (HCC)  Appreciate nephrology, improving    Subjective   Confusion, agitation, and delirium overnight, patient needing gloves and virtual 1:1 maintained, this morning patient calm and AAO x 2, mostly nonsensical speech but is forming words    Objective :  Temp:  [97.4 °F (36.3 °C)-98.1 °F (36.7 °C)] 97.6 °F (36.4 °C)  HR:  [41-49] 44  BP: (143-154)/(69-75) 148/75  Resp:  [12-16] 16  SpO2:  [91 %-96 %] 96 %  O2 Device: None (Room air)    Physical  Exam  General: AAOx1-2, ill-appearing, confused, no acute distress  HEENT: well-healing surgical scars, no ventricular drain, PERRLA, moist mucosa  Respiratory: CTAB w/o wheezes, rales, or rhonchi, no increased work of breathing  Cardiovascular: regular rhythm, bradycardia, w/o murmurs, 2+ radial and pedal pulses, no b/l LE edema  Abdomen: soft, non-tender, non-distended, no hepatomegaly or splenomegaly  /Rectal: deferred  Musculoskeletal: moves all four extremities with normal strength and ROM  Integumentary: warm, dry, no rash or bruising  Neurological: confused, reduced coordination  Psychiatric: pleasant and cooperative with normal mood, affect, and cognition      Lab Results: I have reviewed the following results:CBC/BMP:   .     04/25/25  0501   WBC 5.99   HGB 11.6*   HCT 33.5*   PLT 81*   SODIUM 144   K 4.1      CO2 33*   BUN 51*   CREATININE 1.77*   GLUC 166*   PHOS 4.4*    , LFTs:   .     04/25/25  0501   AST 38   ALT 89*   ALB 3.2*   TBILI 0.85   ALKPHOS 41      Lab Results   Component Value Date    K 4.1 04/25/2025     04/25/2025    CO2 33 (H) 04/25/2025    BUN 51 (H) 04/25/2025    CREATININE 1.77 (H) 04/25/2025    GLUF 131 (H) 02/22/2025    CALCIUM 9.1 04/25/2025    CORRECTEDCA 9.7 04/25/2025    AST 38 04/25/2025    ALT 89 (H) 04/25/2025    ALKPHOS 41 04/25/2025    EGFR 39 04/25/2025     Lab Results   Component Value Date    WBC 5.99 04/25/2025    HGB 11.6 (L) 04/25/2025    HCT 33.5 (L) 04/25/2025    MCV 90 04/25/2025    PLT 81 (L) 04/25/2025     Lab Results   Component Value Date    NEUTROABS 5.19 04/25/2025     Imaging Results Review: No pertinent imaging studies reviewed.  Other Study Results Review: No additional pertinent studies reviewed.    Administrative Statements   I have spent a total time of 35 minutes in caring for this patient on the day of the visit/encounter including Diagnostic results, Prognosis, Risks and benefits of tx options, Instructions for management, Patient and  family education, Importance of tx compliance, Risk factor reductions, Impressions, Counseling / Coordination of care, Documenting in the medical record, Reviewing/placing orders in the medical record (including tests, medications, and/or procedures), Obtaining or reviewing history  , and Communicating with other healthcare professionals .    Additional recommendations to follow per attending, Dr. Lee.    Bryson Garcia DO, PGY4  Hematology/Oncology Fellow

## 2025-04-25 NOTE — ASSESSMENT & PLAN NOTE
Nephrology following. Likely secondary to methotrexate and hypotensive episode. Continue to monitor. Leucovorin for supra therapeutic methotrexate levels completed

## 2025-04-25 NOTE — ASSESSMENT & PLAN NOTE
"Patient with development of worsening confusion, recently seen at the [facility] ED 3/24/2025 with CT head at that time with finding of brain lesion for which MRI was advised to exclude cancerous etiology.     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  Lymphoma/leukemia panel nondiagnostic  CT head on 4/3 with interval increase in ventricular caliber concerning for worsening hydrocephalus  No indication for AED per neurosurgery and holding off steroids until Dx achieved  Continue neurochecks   Neurosurgery and oncology recommended repeat high-volume LP as previous was nondiagnostic  Post LP 4/7 which was again undiagnostic.   MRI of the brain from 4/11-\"Interval enlargement of the dominant left anterior basal ganglionic mass lesion with greater surrounding vasogenic edema and mass effect. 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 Differential considerations include lymphoma, multicentric high-grade glioma, and less likely metastasis\"  S/p brain bx 4/14 by neurosurgery   Nondiagnostic LPs  Patient pulled out EVD over the weekend  Discussed with hematology oncology on plan for Rituxan every week.  Will need to get the rehab facility does get accommodate for this in the outpatient setting.  Will need inpatient methotrexate every 2 or 4 weeks. Still finalizing treatment plan  Heme-onc on board  Discussed with case " management and will be a difficult discharge due due to difficulty of placement when patient needs chemotherapy  Last rituximab dose on 4/24/2025.  Discussed with heme-onc and palliative, will start weaning down on Decadron.  Unfortunately will need to be off of restraints for 24 hours prior to discharge.  Currently still in restraints at this time

## 2025-04-25 NOTE — ASSESSMENT & PLAN NOTE
"Palliative diagnosis: newly diagnosed large B cell lymphoma    Symptom management:  Olanzapine 10mg HS and 2.5mg QAM, additional PRN IM olanzapine available   Discussed this plan with PMR and SLIM  Goal to wean patient from restraints to enable therapy/discharge planning  Continues on decadron 4mg Q6H, wean as appropriate. This may benefit agitation.  oxyIR 2.5-5mg PO Q4H PRN moderate-severe pain  Dilaudid 0.2mg IV Q2H PRN BT pain  Acetaminophen 650mg Q6H PRN mild pain  Bowel regimen in place    Goals:  Level 1 code status  Disease focused care without limits placed  Patient initiated high-dose methotrexate + rituximab inpatient  Current focus on rehabilitation, weaning restraints to enable discharge to [facility]    Decisional apparatus:  Patient does not have capacity on exam today.  If capacity is lost, patient's substitute decision maker would default to spouse and adult children by [place] Act 169.  ER contacts:  Elizabeth Chester  Daughter  [phone]  Guy Hitchcock \"Naldo\" Phillip  Spouse  [phone]    Patient also has a son, Drew, and another daughter, Marina, who has down syndrome. Elizabeth is the main contact but family makes decisions as unit.    Advance Directive/Living Will/POLST: none on file    Social support:  Patient support system: spouse Naldo, 3 adult children, extended family.  Spouse Naldo lives locally and daughter Elizabeth is from [place] but Naldo prefers that she is primary contact due to language barrier (Chinese)  4/22: Met with Naldo along with palliative SW (Pato) and  (Kaylee #[id number]).  Provided medical update, supportive listening, answered questions to her satisfaction  Naldo asks for oncology feedback on prognosis, this was relayed to their team  Supportive listening provided, Naldo at bedside today  Normalized experience of patient/family  Provided anticipatory guidance  Advocated for patient/family with interdisciplinary care team    Coordination of care:  Reviewed case with RN, primary " team    Follow up  Palliative Care will return on Monday 4/28, page sooner with questions or concerns.  Please reach out via [employer] secure chat if questions or concerns arise.    We appreciate the invitation to be involved in this patient's care.

## 2025-04-25 NOTE — PROGRESS NOTES
"Progress Note - Palliative Care   Name: Alexis Dennison 64 y.o. male I MRN: [id number]  Unit/Bed#: [facility] 904-01 I Date of Admission: 3/29/2025   Date of Service: 4/25/2025 I Hospital Day: 27     Assessment & Plan  Palliative care encounter  Palliative diagnosis: newly diagnosed large B cell lymphoma    Symptom management:  Olanzapine 10mg HS and 2.5mg QAM, additional PRN IM olanzapine available   Discussed this plan with PMR and SLIM  Goal to wean patient from restraints to enable therapy/discharge planning  Continues on decadron 4mg Q6H, wean as appropriate. This may benefit agitation.  oxyIR 2.5-5mg PO Q4H PRN moderate-severe pain  Dilaudid 0.2mg IV Q2H PRN BT pain  Acetaminophen 650mg Q6H PRN mild pain  Bowel regimen in place    Goals:  Level 1 code status  Disease focused care without limits placed  Patient initiated high-dose methotrexate + rituximab inpatient  Current focus on rehabilitation, weaning restraints to enable discharge to [facility]    Decisional apparatus:  Patient does not have capacity on exam today.  If capacity is lost, patient's substitute decision maker would default to spouse and adult children by [place] Act 169.  ER contacts:  Elizabeth Chester  Daughter  [phone]  Guy Naldo \"Naldo\" Phillip  Spouse  [phone]    Patient also has a son, Drew, and another daughter, Marina, who has down syndrome. Elizabeth is the main contact but family makes decisions as unit.    Advance Directive/Living Will/POLST: none on file    Social support:  Patient support system: spouse Naldo, 3 adult children, extended family.  Spouse Naldo lives locally and daughter Elizabeth is from [place] but Naldo prefers that she is primary contact due to language barrier (Chinese)  4/22: Met with Naldo along with palliative SW (Shikha and Brynn) and  (Kaylee #[id number]).  Provided medical update, supportive listening, answered questions to her satisfaction  Naldo asks for oncology feedback on prognosis, this was relayed to their team  Supportive " listening provided, Naldo at bedside today  Normalized experience of patient/family  Provided anticipatory guidance  Advocated for patient/family with interdisciplinary care team    Coordination of care:  Reviewed case with RN, primary team    Follow up  Palliative Care will return on Monday 4/28, page sooner with questions or concerns.  Please reach out via [employer] secure chat if questions or concerns arise.    We appreciate the invitation to be involved in this patient's care.   Brain tumor (HCC)  Admitted 3/29 with worsening confusion in the setting of recent ED visit with CTA head and neck 3/24 demonstrating multiple nonspecific hyperdense ependymal/subependymal nodules, largest located adjacent to the foramen of Monro (2 x 1.1 x 1 cm ) and f/u outpatient MRI 3/26 demonstrating multiple scattered foci of subependymal nodular enhancement throughout ventricles (left worse than right) with mild hydrocephalus (left worse than right), scattered nodular areas of enhancement in left anterior inferior basal ganglia involving left anterior commissure, and smaller foci of nodular enhancement along anteromedial aspect of left cerebral peduncle and left quirino.with concern for primary CNS malignancy  Per sister, both patient's parents with history of lymphoma  CTH on admission 3/29 stable subependymal nodules and left foramen of Monro region hyperdense lesion, dilation of left lateral ventricle and minimal left to right midline shift  4/3 imaging concerning for worsening hydrocephalus  2 non diagnostic lumbar punctures performed  MRI brain 4/11 with interval disease progression  4/13 developed worsening mentation and CTH demonstrated predominantly left sided obstructive hydrocephalus from lesion located adjacent to the foramen of Monro, now s/p left front EVD  4/14 underwent left frontal endoscopic biopsy and replacement of EVD  Preliminary pathology large B-cell lymphoma    Medical oncology following  Primary CNS  lymphoma  Appreciate oncology input  methotrexate and rituximab started this admission  Oncology considering 4 week methotrexate cycle to allow more time for rehab/recovery before next dose  Weekly rituximab  Spouse requesting prognostic information from oncology team, relayed  Encephalopathy  In the setting of the above  On exam, he is confused but interactive. Oriented to name only, does not recall , disoriented to all else.  EVD dislodged due to  agitation while admitted to ICU but did not need to be replaced secondary to stable ventricle size  Remains on UE mits/restraints, wean as tolerated. Olanzapine as above  Pulmonary nodule  CTA 3/29 demonstrates non specific 4 mm right lower lobe pulmonary nodule  Recommended 3 mo f/u  Liver lesion  CTA 3/29 demonstrates non specific 12 mm hypodense lesion within right hepatic lobe, MRI recommended  MRI 3/30 demonstrates no suspicious hepatic lesions, simple right hepatic lobe cyst noted    LETY (acute kidney injury) (HCC)  Nephrology following. Likely secondary to methotrexate and hypotensive episode. Continue to monitor. Leucovorin for supra therapeutic methotrexate levels completed  Metabolic alkalosis    Interval history:       Patient became agitated yesterday evening requiring as needed olanzapine at 1749.  Otherwise received scheduled olanzapine at bedtime and in the AM.  During time of encounter he is slightly restless but redirected by spouse at bedside. Provided an update. Naldo is hopeful that increased activity will calm Alexis down as he was a very active person prior to admission.     MEDICATIONS / ALLERGIES:     all current active meds have been reviewed    No Known Allergies    OBJECTIVE:    Physical Exam  Physical Exam  HENT:      Head: Atraumatic.   Eyes:      Conjunctiva/sclera: Conjunctivae normal.   Cardiovascular:      Rate and Rhythm: Normal rate.   Pulmonary:      Effort: No respiratory distress.   Abdominal:      Tenderness: There is no guarding.    Musculoskeletal:         General: No swelling.   Skin:     General: Skin is warm and dry.   Neurological:      Mental Status: He is alert.      Comments: Confused   Psychiatric:      Comments: Restless, impulsive       Lab Results:   Results from last 7 days   Lab Units 04/25/25  0501 04/24/25  0515 04/22/25  0515 04/20/25  0534 04/19/25  0719   WBC Thousand/uL 5.99 6.33 10.29*   < > 10.29*   HEMOGLOBIN g/dL 11.6* 11.6* 14.7   < > 13.5   HEMATOCRIT % 33.5* 34.8* 41.1   < > 36.0*   PLATELETS Thousands/uL 81* 110* 154   < > 87*   SEGS PCT % 86* 89*  --   --  89*   MONO PCT % 3* 1* 0*  --  5   EOS PCT % 0 0 0  --  0    < > = values in this interval not displayed.     Results from last 7 days   Lab Units 04/25/25  0501 04/24/25  0515 04/23/25  0625   POTASSIUM mmol/L 4.1 4.3 4.4   CHLORIDE mmol/L 104 106 103   CO2 mmol/L 33* 31 34*   BUN mg/dL 51* 69* 63*   CREATININE mg/dL 1.77* 2.10* 2.63*   CALCIUM mg/dL 9.1 9.2 8.9   ALK PHOS U/L 41 38 41   ALT U/L 89* 72* 77*   AST U/L 38 26 27       Imaging Studies: reviewed pertinent studies   EKG, Pathology, and Other Studies: reviewed pertinent studies    Counseling / Coordination of Care    Total floor / unit time spent today 25+ minutes. Greater than 50% of total time was spent with the patient and / or family counseling and / or coordination of care. A description of the counseling / coordination of care: symptom assessment and management, medication review, psychosocial support, chart review, supportive listening, anticipatory guidance, and coordination with primary team, RN, family at bedside.

## 2025-04-25 NOTE — ASSESSMENT & PLAN NOTE
Appreciate oncology input  methotrexate and rituximab started this admission  Oncology considering 4 week methotrexate cycle to allow more time for rehab/recovery before next dose  Weekly rituximab  Spouse requesting prognostic information from oncology team, relayed   spontaneous

## 2025-04-26 LAB
ALBUMIN SERPL BCG-MCNC: 3.3 G/DL (ref 3.5–5)
ALP SERPL-CCNC: 41 U/L (ref 34–104)
ALT SERPL W P-5'-P-CCNC: 101 U/L (ref 7–52)
ANION GAP SERPL CALCULATED.3IONS-SCNC: 8 MMOL/L (ref 4–13)
AST SERPL W P-5'-P-CCNC: 39 U/L (ref 13–39)
BASOPHILS # BLD AUTO: 0 THOUSANDS/ÂΜL (ref 0–0.1)
BASOPHILS NFR BLD AUTO: 0 % (ref 0–1)
BILIRUB SERPL-MCNC: 1.08 MG/DL (ref 0.2–1)
BUN SERPL-MCNC: 45 MG/DL (ref 5–25)
CALCIUM ALBUM COR SERPL-MCNC: 10 MG/DL (ref 8.3–10.1)
CALCIUM SERPL-MCNC: 9.4 MG/DL (ref 8.4–10.2)
CHLORIDE SERPL-SCNC: 107 MMOL/L (ref 96–108)
CO2 SERPL-SCNC: 29 MMOL/L (ref 21–32)
CREAT SERPL-MCNC: 1.55 MG/DL (ref 0.6–1.3)
EOSINOPHIL # BLD AUTO: 0.05 THOUSAND/ÂΜL (ref 0–0.61)
EOSINOPHIL NFR BLD AUTO: 1 % (ref 0–6)
ERYTHROCYTE [DISTWIDTH] IN BLOOD BY AUTOMATED COUNT: 11.5 % (ref 11.6–15.1)
GFR SERPL CREATININE-BSD FRML MDRD: 46 ML/MIN/1.73SQ M
GLUCOSE SERPL-MCNC: 149 MG/DL (ref 65–140)
GLUCOSE SERPL-MCNC: 239 MG/DL (ref 65–140)
GLUCOSE SERPL-MCNC: 70 MG/DL (ref 65–140)
GLUCOSE SERPL-MCNC: 75 MG/DL (ref 65–140)
GLUCOSE SERPL-MCNC: 83 MG/DL (ref 65–140)
HCT VFR BLD AUTO: 35.2 % (ref 36.5–49.3)
HGB BLD-MCNC: 12.1 G/DL (ref 12–17)
IMM GRANULOCYTES # BLD AUTO: 0.12 THOUSAND/UL (ref 0–0.2)
IMM GRANULOCYTES NFR BLD AUTO: 1 % (ref 0–2)
LYMPHOCYTES # BLD AUTO: 1.41 THOUSANDS/ÂΜL (ref 0.6–4.47)
LYMPHOCYTES NFR BLD AUTO: 15 % (ref 14–44)
MCH RBC QN AUTO: 30.8 PG (ref 26.8–34.3)
MCHC RBC AUTO-ENTMCNC: 34.4 G/DL (ref 31.4–37.4)
MCV RBC AUTO: 90 FL (ref 82–98)
MONOCYTES # BLD AUTO: 0.27 THOUSAND/ÂΜL (ref 0.17–1.22)
MONOCYTES NFR BLD AUTO: 3 % (ref 4–12)
NEUTROPHILS # BLD AUTO: 7.73 THOUSANDS/ÂΜL (ref 1.85–7.62)
NEUTS SEG NFR BLD AUTO: 80 % (ref 43–75)
NRBC BLD AUTO-RTO: 0 /100 WBCS
PLATELET # BLD AUTO: 61 THOUSANDS/UL (ref 149–390)
PMV BLD AUTO: 9.8 FL (ref 8.9–12.7)
POTASSIUM SERPL-SCNC: 3.9 MMOL/L (ref 3.5–5.3)
PROT SERPL-MCNC: 5.2 G/DL (ref 6.4–8.4)
RBC # BLD AUTO: 3.93 MILLION/UL (ref 3.88–5.62)
SODIUM SERPL-SCNC: 144 MMOL/L (ref 135–147)
WBC # BLD AUTO: 9.58 THOUSAND/UL (ref 4.31–10.16)

## 2025-04-26 PROCEDURE — 85025 COMPLETE CBC W/AUTO DIFF WBC: CPT

## 2025-04-26 PROCEDURE — 82948 REAGENT STRIP/BLOOD GLUCOSE: CPT

## 2025-04-26 PROCEDURE — 80053 COMPREHEN METABOLIC PANEL: CPT

## 2025-04-26 PROCEDURE — 99232 SBSQ HOSP IP/OBS MODERATE 35: CPT

## 2025-04-26 RX ORDER — DEXAMETHASONE SODIUM PHOSPHATE 4 MG/ML
4 INJECTION, SOLUTION INTRA-ARTICULAR; INTRALESIONAL; INTRAMUSCULAR; INTRAVENOUS; SOFT TISSUE DAILY
Status: COMPLETED | OUTPATIENT
Start: 2025-04-27 | End: 2025-04-27

## 2025-04-26 RX ORDER — DEXAMETHASONE SODIUM PHOSPHATE 4 MG/ML
2 INJECTION, SOLUTION INTRA-ARTICULAR; INTRALESIONAL; INTRAMUSCULAR; INTRAVENOUS; SOFT TISSUE ONCE
Status: COMPLETED | OUTPATIENT
Start: 2025-04-28 | End: 2025-04-28

## 2025-04-26 RX ORDER — DEXAMETHASONE SODIUM PHOSPHATE 4 MG/ML
4 INJECTION, SOLUTION INTRA-ARTICULAR; INTRALESIONAL; INTRAMUSCULAR; INTRAVENOUS; SOFT TISSUE DAILY
Status: DISCONTINUED | OUTPATIENT
Start: 2025-04-27 | End: 2025-04-26

## 2025-04-26 RX ORDER — WATER 10 ML/10ML
INJECTION INTRAMUSCULAR; INTRAVENOUS; SUBCUTANEOUS
Status: COMPLETED
Start: 2025-04-26 | End: 2025-04-26

## 2025-04-26 RX ADMIN — Medication 6 MG: at 22:13

## 2025-04-26 RX ADMIN — OLANZAPINE 2.5 MG: 2.5 TABLET, FILM COATED ORAL at 08:12

## 2025-04-26 RX ADMIN — DEXAMETHASONE SODIUM PHOSPHATE 4 MG: 4 INJECTION INTRA-ARTICULAR; INTRALESIONAL; INTRAMUSCULAR; INTRAVENOUS; SOFT TISSUE at 08:12

## 2025-04-26 RX ADMIN — SENNOSIDES 17.2 MG: 8.6 TABLET, FILM COATED ORAL at 22:13

## 2025-04-26 RX ADMIN — OLANZAPINE 5 MG: 10 INJECTION, POWDER, FOR SOLUTION INTRAMUSCULAR at 01:19

## 2025-04-26 RX ADMIN — CHLORHEXIDINE GLUCONATE 0.12% ORAL RINSE 15 ML: 1.2 LIQUID ORAL at 22:13

## 2025-04-26 RX ADMIN — INSULIN LISPRO 10 UNITS: 100 INJECTION, SOLUTION INTRAVENOUS; SUBCUTANEOUS at 17:10

## 2025-04-26 RX ADMIN — SODIUM CHLORIDE, SODIUM LACTATE, POTASSIUM CHLORIDE, AND CALCIUM CHLORIDE 75 ML/HR: .6; .31; .03; .02 INJECTION, SOLUTION INTRAVENOUS at 06:03

## 2025-04-26 RX ADMIN — ALLOPURINOL 300 MG: 300 TABLET ORAL at 08:12

## 2025-04-26 RX ADMIN — CHLORHEXIDINE GLUCONATE 0.12% ORAL RINSE 15 ML: 1.2 LIQUID ORAL at 08:12

## 2025-04-26 RX ADMIN — SODIUM CHLORIDE, SODIUM LACTATE, POTASSIUM CHLORIDE, AND CALCIUM CHLORIDE 75 ML/HR: .6; .31; .03; .02 INJECTION, SOLUTION INTRAVENOUS at 19:35

## 2025-04-26 RX ADMIN — PANTOPRAZOLE SODIUM 40 MG: 40 TABLET, DELAYED RELEASE ORAL at 05:59

## 2025-04-26 RX ADMIN — OLANZAPINE 10 MG: 10 TABLET, FILM COATED ORAL at 22:13

## 2025-04-26 RX ADMIN — WATER 1 ML: 1 INJECTION INTRAMUSCULAR; INTRAVENOUS; SUBCUTANEOUS at 01:21

## 2025-04-26 RX ADMIN — LEVETIRACETAM 500 MG: 500 TABLET, FILM COATED ORAL at 22:13

## 2025-04-26 RX ADMIN — HEPARIN SODIUM 5000 UNITS: 5000 INJECTION INTRAVENOUS; SUBCUTANEOUS at 06:02

## 2025-04-26 RX ADMIN — POLYETHYLENE GLYCOL 3350 17 G: 17 POWDER, FOR SOLUTION ORAL at 08:12

## 2025-04-26 RX ADMIN — LEVETIRACETAM 500 MG: 500 TABLET, FILM COATED ORAL at 08:12

## 2025-04-26 RX ADMIN — ATORVASTATIN CALCIUM 20 MG: 20 TABLET, FILM COATED ORAL at 08:12

## 2025-04-26 RX ADMIN — HEPARIN SODIUM 5000 UNITS: 5000 INJECTION INTRAVENOUS; SUBCUTANEOUS at 13:22

## 2025-04-26 RX ADMIN — HEPARIN SODIUM 5000 UNITS: 5000 INJECTION INTRAVENOUS; SUBCUTANEOUS at 22:14

## 2025-04-26 NOTE — ASSESSMENT & PLAN NOTE
"Patient with development of worsening confusion, recently seen at the [facility] ED 3/24/2025 with CT head at that time with finding of brain lesion for which MRI was advised to exclude cancerous etiology.     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  Lymphoma/leukemia panel nondiagnostic  CT head on 4/3 with interval increase in ventricular caliber concerning for worsening hydrocephalus  No indication for AED per neurosurgery and holding off steroids until Dx achieved  Continue neurochecks   Neurosurgery and oncology recommended repeat high-volume LP as previous was nondiagnostic  Post LP 4/7 which was again undiagnostic.   MRI of the brain from 4/11-\"Interval enlargement of the dominant left anterior basal ganglionic mass lesion with greater surrounding vasogenic edema and mass effect. 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 Differential considerations include lymphoma, multicentric high-grade glioma, and less likely metastasis\"  S/p brain bx 4/14 by neurosurgery   Nondiagnostic LPs  Patient pulled out EVD over the weekend  Discussed with hematology oncology on plan for Rituxan every week.  Will need to get the rehab facility does get accommodate for this in the outpatient setting.  Will need inpatient methotrexate every 2 or 4 weeks. Still finalizing treatment plan  Discussed with case management and " will be a difficult discharge due due to difficulty of placement since patient will need chemo and only few places can accept  Last rituximab dose on 4/24/2025.  Discussed with heme-onc and palliative, will start weaning down on Decadron.  Unfortunately will need to be off of restraints for 24 hours prior to discharge.  Currently still in restraints at this time.   Patient likely delirious from the Benadryl as well as the steroids that were given  Will wean off  Also awake all night. Will try to adjust sleep wake cycles

## 2025-04-26 NOTE — ASSESSMENT & PLAN NOTE
Lab Results   Component Value Date    CREATININE 1.55 (H) 04/26/2025    CREATININE 1.77 (H) 04/25/2025    CREATININE 2.10 (H) 04/24/2025       Lab Results   Component Value Date    EGFR 46 04/26/2025    EGFR 39 04/25/2025    EGFR 32 04/24/2025   Creatinine continues to uptrend.  Clear in the setting of hypotension episode and methotrexate toxicity  Continue IV fluids per nephrology

## 2025-04-26 NOTE — PROGRESS NOTES
"Discussed with progress Note - Hospitalist   Name: Alexis Dennison 64 y.o. male I MRN: [id number]  Unit/Bed#: [facility] 904-01 I Date of Admission: 3/29/2025   Date of Service: 4/26/2025 I Hospital Day: 28     Assessment & Plan  Brain tumor (HCC)  Patient with development of worsening confusion, recently seen at the [facility] ED 3/24/2025 with CT head at that time with finding of brain lesion for which MRI was advised to exclude cancerous etiology.     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  Lymphoma/leukemia panel nondiagnostic  CT head on 4/3 with interval increase in ventricular caliber concerning for worsening hydrocephalus  No indication for AED per neurosurgery and holding off steroids until Dx achieved  Continue neurochecks   Neurosurgery and oncology recommended repeat high-volume LP as previous was nondiagnostic  Post LP 4/7 which was again undiagnostic.   MRI of the brain from 4/11-\"Interval enlargement of the dominant left anterior basal ganglionic mass lesion with greater surrounding vasogenic edema and mass effect. 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 Differential considerations include lymphoma, multicentric high-grade glioma, and less likely metastasis\"  S/p brain bx 4/14 by neurosurgery   Nondiagnostic LPs  Patient pulled out EVD over the weekend  Discussed with hematology oncology on plan for " Rituxan every week.  Will need to get the rehab facility does get accommodate for this in the outpatient setting.  Will need inpatient methotrexate every 2 or 4 weeks. Still finalizing treatment plan  Discussed with case management and will be a difficult discharge due due to difficulty of placement since patient will need chemo and only few places can accept  Last rituximab dose on 4/24/2025.  Discussed with heme-onc and palliative, will start weaning down on Decadron.  Unfortunately will need to be off of restraints for 24 hours prior to discharge.  Currently still in restraints at this time.   Patient likely delirious from the Benadryl as well as the steroids that were given  Will wean off  Also awake all night. Will try to adjust sleep wake cycles  LETY (acute kidney injury) ([facility])  Lab Results   Component Value Date    CREATININE 1.55 (H) 04/26/2025    CREATININE 1.77 (H) 04/25/2025    CREATININE 2.10 (H) 04/24/2025       Lab Results   Component Value Date    EGFR 46 04/26/2025    EGFR 39 04/25/2025    EGFR 32 04/24/2025   Creatinine continues to uptrend.  Clear in the setting of hypotension episode and methotrexate toxicity  Continue IV fluids per nephrology    Encephalopathy  Worsening, patient is more lethargic today  Due to brain mass as above  Patient with acute onset confusion, forgetting names/places, oriented to self only most of the times  Delirium precautions  Patient has improving encephalopathy according to the daughter.    Type 2 diabetes mellitus with hyperglycemia, without long-term current use of insulin ([facility])  Has been well-controlled as outpatient with hemoglobin A1c of 6.6%  Hold home oral medications, start correctional insulin coverage   Accu-Cheks reviewed and acceptable  Continue hypoglycemia protocol and carb controlled diet  Mixed hyperlipidemia  Continue statin  HTN, goal below 130/80  Continue losartan and hydrochlorothiazide  BP reviewed and acceptable  Liver lesion  MRI obtained: No  suspicious hepatic lesions.  There is a simple right hepatic lobe cyst.  Mild diffuse hepatic steatosis.  LFTs stable  Outpatient follow up advised  Thyroid nodule  Incidental findings on CT scan  Nonemergent thyroid ultrasound for follow-up in the outpatient setting  Pulmonary nodule  Imaging with 4 mm right lower lobe pulmonary nodule.  CT chest 3-month follow-up  Constipation  Continue senna, MiraLAX and suppository  Ambulate patient as able  Ambulatory dysfunction  PT/OT recommending level 2 at discharge  Awaiting medically stability  Ambulate patient as able  Fall precautions  Goals of care, counseling/discussion    Palliative care encounter    Primary CNS lymphoma  Plan as above  Metabolic alkalosis      VTE Pharmacologic Prophylaxis: VTE Score: 5 High Risk (Score >/= 5) - Pharmacological DVT Prophylaxis Ordered: heparin. Sequential Compression Devices Ordered.    Mobility:   Basic Mobility Inpatient Raw Score: 13  JH-HLM Goal: 4: Move to chair/commode  JH-HLM Achieved: 6: Walk 10 steps or more  JH-HLM Goal NOT achieved. Continue with multidisciplinary rounding and encourage appropriate mobility to improve upon JH-HLM goals.    Patient Centered Rounds: I performed bedside rounds with nursing staff today.   Discussions with Specialists or Other Care Team Provider: Palliative, nephrology    Education and Discussions with Family / Patient: Updated  (daughter) via phone.    Current Length of Stay: 28 day(s)  Current Patient Status: Inpatient   Certification Statement: The patient will continue to require additional inpatient hospital stay due to placement.  Discharge Plan: Anticipate discharge in 24-48 hrs to rehab facility.    Code Status: Level 1 - Full Code    Subjective   No complaints at this time.    Objective :  Temp:  [97.7 °F (36.5 °C)-98.5 °F (36.9 °C)] 97.9 °F (36.6 °C)  HR:  [46-55] 53  BP: (135-148)/(69-75) 135/69  Resp:  [17-18] 18  SpO2:  [94 %-96 %] 96 %    Body mass index is 24.67  kg/m².     Input and Output Summary (last 24 hours):     Intake/Output Summary (Last 24 hours) at 4/26/2025 0947  Last data filed at 4/26/2025 0700  Gross per 24 hour   Intake 1978.42 ml   Output 3075 ml   Net -1096.58 ml       Physical Exam  Vitals and nursing note reviewed.   Constitutional:       Appearance: He is well-developed and normal weight.      Comments: Restraints are still on.  Patient is currently delirious not oriented to place and time.   HENT:      Head: Normocephalic and atraumatic.      Nose: Nose normal.      Mouth/Throat:      Mouth: Mucous membranes are moist.   Eyes:      Conjunctiva/sclera: Conjunctivae normal.   Cardiovascular:      Rate and Rhythm: Normal rate and regular rhythm.      Heart sounds: Normal heart sounds. No murmur heard.  Pulmonary:      Effort: Pulmonary effort is normal. No respiratory distress.      Breath sounds: Normal breath sounds.   Abdominal:      General: Abdomen is flat.      Palpations: Abdomen is soft.      Tenderness: There is no abdominal tenderness.   Musculoskeletal:         General: No swelling.      Cervical back: Neck supple.      Right lower leg: No edema.      Left lower leg: No edema.   Skin:     General: Skin is warm and dry.      Capillary Refill: Capillary refill takes less than 2 seconds.   Neurological:      General: No focal deficit present.      Mental Status: He is alert and oriented to person, place, and time. Mental status is at baseline.   Psychiatric:         Mood and Affect: Mood normal.           Lines/Drains:  Lines/Drains/Airways       Active Status       Name Placement date Placement time Site Days    PICC Line 04/16/25 Right Basilic 04/16/25  1526  Basilic  9    External Urinary Catheter Medium 04/24/25  1000  -- 1                    Central Line:  Goal for removal: Will discontinue when hemodynamically stable.               Lab Results: I have reviewed the following results:   Results from last 7 days   Lab Units 04/26/25  0551   WBC  Thousand/uL 9.58   HEMOGLOBIN g/dL 12.1   HEMATOCRIT % 35.2*   PLATELETS Thousands/uL 61*   SEGS PCT % 80*   LYMPHO PCT % 15   MONO PCT % 3*   EOS PCT % 1     Results from last 7 days   Lab Units 04/26/25  0551   SODIUM mmol/L 144   POTASSIUM mmol/L 3.9   CHLORIDE mmol/L 107   CO2 mmol/L 29   BUN mg/dL 45*   CREATININE mg/dL 1.55*   ANION GAP mmol/L 8   CALCIUM mg/dL 9.4   ALBUMIN g/dL 3.3*   TOTAL BILIRUBIN mg/dL 1.08*   ALK PHOS U/L 41   ALT U/L 101*   AST U/L 39   GLUCOSE RANDOM mg/dL 83         Results from last 7 days   Lab Units 04/26/25  0739 04/25/25  2339 04/25/25 2029 04/25/25  1616 04/25/25  1109 04/25/25  0733 04/24/25 2039 04/24/25  1626 04/24/25  1118 04/24/25  0721 04/23/25 2036 04/23/25  1610   POC GLUCOSE mg/dl 70 166* 172* 140 171* 145* 218* 166* 197* 156* 190* 206*               Recent Cultures (last 7 days):           Last 24 Hours Medication List:     Current Facility-Administered Medications:     acetaminophen (TYLENOL) tablet 650 mg, Q6H PRN    allopurinol (ZYLOPRIM) tablet 300 mg, Daily    alteplase (CATHFLO) injection 2 mg, Q1MIN PRN    alteplase (CATHFLO) injection 2 mg, Q1MIN PRN    aluminum-magnesium hydroxide-simethicone (MAALOX) oral suspension 30 mL, Q4H PRN    atorvastatin (LIPITOR) tablet 20 mg, Daily    bisacodyl (DULCOLAX) rectal suppository 10 mg, Daily    calcium carbonate (TUMS) chewable tablet 1,000 mg, Daily PRN    chlorhexidine (PERIDEX) 0.12 % oral rinse 15 mL, Q12H CAIT    [START ON 4/28/2025] dexamethasone (DECADRON) injection 2 mg, Once    [START ON 4/27/2025] dexamethasone (DECADRON) injection 4 mg, Daily    heparin (porcine) subcutaneous injection 5,000 Units, Q8H CAIT    HYDROmorphone HCl (DILAUDID) injection 0.2 mg, Q2H PRN    insulin glargine (LANTUS) subcutaneous injection 10 Units 0.1 mL, HS    insulin lispro (HumALOG/ADMELOG) 100 units/mL subcutaneous injection 5-25 Units, TID AC **AND** Fingerstick Glucose (POCT), TID AC    insulin lispro (HumALOG/ADMELOG) 100  units/mL subcutaneous injection 5-25 Units, HS    lactated ringers infusion, Continuous, Last Rate: 75 mL/hr (04/26/25 0603)    levETIRAcetam (KEPPRA) tablet 500 mg, Q12H CAIT    melatonin tablet 6 mg, HS    OLANZapine (ZyPREXA) IM injection 5 mg, Q8H PRN    OLANZapine (ZyPREXA) tablet 10 mg, HS    OLANZapine (ZyPREXA) tablet 2.5 mg, QAM    ondansetron (ZOFRAN) injection 4 mg, Q6H PRN    oxyCODONE (ROXICODONE) split tablet 2.5 mg, Q4H PRN **OR** oxyCODONE (ROXICODONE) IR tablet 5 mg, Q4H PRN    pantoprazole (PROTONIX) EC tablet 40 mg, Early Morning    polyethylene glycol (MIRALAX) packet 17 g, Daily    senna (SENOKOT) tablet 17.2 mg, HS    sodium chloride 0.9 % infusion, Once PRN, Last Rate: Stopped (04/17/25 2051)    sodium chloride 0.9 % infusion, Once PRN    sodium chloride 0.9 % infusion, Once PRN    Administrative Statements   Today, Patient Was Seen By: Rustam Drummond MD  I have spent a total time of 35 minutes in caring for this patient on the day of the visit/encounter including Diagnostic results, Prognosis, Risks and benefits of tx options, Instructions for management, Patient and family education, Importance of tx compliance, Risk factor reductions, Impressions, Counseling / Coordination of care, Documenting in the medical record, Reviewing/placing orders in the medical record (including tests, medications, and/or procedures), Obtaining or reviewing history  , and Communicating with other healthcare professionals .    **Please Note: This note may have been constructed using a voice recognition system.**

## 2025-04-26 NOTE — PLAN OF CARE
Problem: PAIN - ADULT  Goal: Verbalizes/displays adequate comfort level or baseline comfort level  Description: Interventions:- Encourage patient to monitor pain and request assistance- Assess pain using appropriate pain scale- Administer analgesics based on type and severity of pain and evaluate response- Implement non-pharmacological measures as appropriate and evaluate response- Notify physician/advanced practitioner if interventions unsuccessful or patient reports new pain  Outcome: Progressing     Problem: INFECTION - ADULT  Goal: Absence or prevention of progression during hospitalization  Description: INTERVENTIONS:- Assess and monitor for signs and symptoms of infection- Monitor lab/diagnostic results- Monitor all insertion sites, i.e. indwelling lines, tubes, and drains- [place] appropriate cooling/warming therapies per order- Administer medications as ordered- Instruct and encourage patient and family to use good hand hygiene technique- Identify and instruct in appropriate isolation precautions for identified infection/condition  Outcome: Progressing     Problem: SAFETY ADULT  Goal: Patient will remain free of falls  Description: INTERVENTIONS:- Educate patient/family on patient safety including physical limitations- Instruct patient to call for assistance with activity - Consult OT/PT to assist with strengthening/mobility - Keep Call bell within reach- Keep bed low and locked with side rails adjusted as appropriate- Keep care items and personal belongings within reach- Initiate and maintain comfort rounds- Make Fall Risk Sign visible to staff- Offer Toileting every 2 Hours, in advance of need- Initiate/Maintain bed/chair alarm- Obtain necessary fall risk management equipment.- Apply yellow socks and bracelet for high fall risk patients- Consider moving patient to room near nurses station  INTERVENTIONS:- Educate patient/family on patient safety including physical limitations- Instruct patient to call  for assistance with activity - Consult OT/PT to assist with strengthening/mobility - Keep Call bell within reach- Keep bed low and locked with side rails adjusted as appropriate- Keep care items and personal belongings within reach- Initiate and maintain comfort rounds- Make Fall Risk Sign visible to staff- Offer Toileting every 2 Hours, in advance of need- Initiate/Maintain bed alarm- Obtain necessary fall risk management equipment: bracelet, socks, alarms- Apply yellow socks and bracelet for high fall risk patients- Consider moving patient to room near nurses station  Outcome: Progressing  Goal: Maintain or return to baseline ADL function  Description: INTERVENTIONS:-  Assess patient's ability to carry out ADLs; assess patient's baseline for ADL function and identify physical deficits which impact ability to perform ADLs (bathing, care of mouth/teeth, toileting, grooming, dressing, etc.)- Assess/evaluate cause of self-care deficits - Assess range of motion- Assess patient's mobility; develop plan if impaired- Assess patient's need for assistive devices and provide as appropriate- Encourage maximum independence but intervene and supervise when necessary- Involve family in performance of ADLs- Assess for home care needs following discharge - Consider OT consult to assist with ADL evaluation and planning for discharge- Provide patient education as appropriate  Outcome: Progressing  Goal: Maintains/Returns to pre admission functional level  Description: INTERVENTIONS:- Perform AM-PAC 6 Click Basic Mobility/ Daily Activity assessment daily.- Set and communicate daily mobility goal to care team and patient/family/caregiver. - Collaborate with rehabilitation services on mobility goals if consulted- Reposition patient every 2 hours.- Dangle patient 3 times a day- Stand patient 3 times a day- Ambulate patient 3 times a day- Out of bed to chair 3 times a day - Out of bed for meals 3 times a day- Out of bed for toileting-  Record patient progress and toleration of activity level   Outcome: Progressing     Problem: DISCHARGE PLANNING  Goal: Discharge to home or other facility with appropriate resources  Description: INTERVENTIONS:- Identify barriers to discharge w/patient and caregiver- Arrange for needed discharge resources and transportation as appropriate- Identify discharge learning needs (meds, wound care, etc.)- Refer to Case Management Department for coordinating discharge planning if the patient needs post-hospital services based on physician/advanced practitioner order or complex needs related to functional status, cognitive ability, or social support system  Outcome: Progressing     Problem: Knowledge Deficit  Goal: Patient/family/caregiver demonstrates understanding of disease process, treatment plan, medications, and discharge instructions  Description: Complete learning assessment and assess knowledge base.Interventions:- Provide teaching at level of understanding- Provide teaching via preferred learning methods  Outcome: Progressing     Problem: NEUROSENSORY - ADULT  Goal: Achieves stable or improved neurological status  Description: INTERVENTIONS- Monitor and report changes in neurological status- Monitor vital signs such as temperature, blood pressure, glucose, and any other labs ordered - Initiate measures to prevent increased intracranial pressure- Monitor for seizure activity and implement precautions if appropriate    INTERVENTIONS- Monitor and report changes in neurological status- Monitor vital signs such as temperature, blood pressure, glucose, and any other labs ordered - Initiate measures to prevent increased intracranial pressure- Monitor for seizure activity and implement precautions if appropriate    Outcome: Progressing  Goal: Remains free of injury related to seizures activity  Description: INTERVENTIONS- Maintain airway, patient safety  and administer oxygen as ordered- Monitor patient for seizure  activity, document and report duration and description of seizure to physician/advanced practitioner- If seizure occurs,  ensure patient safety during seizure- Reorient patient post seizure- Seizure pads on all 4 side rails- Instruct patient/family to notify RN of any seizure activity including if an aura is experienced- Instruct patient/family to call for assistance with activity based on nursing assessment- Administer anti-seizure medications if ordered  INTERVENTIONS- Maintain airway, patient safety  and administer oxygen as ordered- Monitor patient for seizure activity, document and report duration and description of seizure to physician/advanced practitioner- If seizure occurs,  ensure patient safety during seizure- Reorient patient post seizure- Seizure pads on all 4 side rails- Instruct patient/family to notify RN of any seizure activity including if an aura is experienced- Instruct patient/family to call for assistance with activity based on nursing assessment- Administer anti-seizure medications if ordered  Outcome: Progressing  Goal: Achieves maximal functionality and self care  Description: INTERVENTIONS- Monitor swallowing and airway patency with patient fatigue and changes in neurological status- Encourage and assist patient to increase activity and self care. - Encourage visually impaired, hearing impaired and aphasic patients to use assistive/communication devices  INTERVENTIONS- Monitor swallowing and airway patency with patient fatigue and changes in neurological status- Encourage and assist patient to increase activity and self care. - Encourage visually impaired, hearing impaired and aphasic patients to use assistive/communication devices  Outcome: Progressing     Problem: METABOLIC, FLUID AND ELECTROLYTES - ADULT  Goal: Glucose maintained within target range  Description: INTERVENTIONS:- Monitor Blood Glucose as ordered- Assess for signs and symptoms of hyperglycemia and hypoglycemia- Administer  ordered medications to maintain glucose within target range- Assess nutritional intake and initiate nutrition service referral as needed  INTERVENTIONS:- Monitor Blood Glucose as ordered- Assess for signs and symptoms of hyperglycemia and hypoglycemia- Administer ordered medications to maintain glucose within target range- Assess nutritional intake and initiate nutrition service referral as needed  Outcome: Progressing  Goal: Electrolytes maintained within normal limits  Description: INTERVENTIONS:- Monitor labs and assess patient for signs and symptoms of electrolyte imbalances- Administer electrolyte replacement as ordered- Monitor response to electrolyte replacements, including repeat lab results as appropriate- Instruct patient on fluid and nutrition as appropriate  Outcome: Progressing  Goal: Fluid balance maintained  Description: INTERVENTIONS:- Monitor labs - Monitor I/O and WT- Instruct patient on fluid and nutrition as appropriate- Assess for signs & symptoms of volume excess or deficit  Outcome: Progressing     Problem: Prexisting or High Potential for Compromised Skin Integrity  Goal: Skin integrity is maintained or improved  Description: INTERVENTIONS:- Identify patients at risk for skin breakdown- Assess and monitor skin integrity- Assess and monitor nutrition and hydration status- Monitor labs - Assess for incontinence - Turn and reposition patient- Assist with mobility/ambulation- Relieve pressure over bony prominences- Avoid friction and shearing- Provide appropriate hygiene as needed including keeping skin clean and dry- Evaluate need for skin moisturizer/barrier cream- Collaborate with interdisciplinary team - Patient/family teaching- Consider wound care consult   Outcome: Progressing     Problem: SAFETY,RESTRAINT: NV/NON-SELF DESTRUCTIVE BEHAVIOR  Goal: Remains free of harm/injury (restraint for non violent/non self-detsructive behavior)  Description: INTERVENTIONS:- Instruct patient/family  regarding restraint use - Assess and monitor physiologic and psychological status - Provide interventions and comfort measures to meet assessed patient needs - Identify and implement measures to help patient regain control- Assess readiness for release of restraint   Outcome: Progressing  Goal: Returns to optimal restraint-free functioning  Description: INTERVENTIONS:- Assess the patient's behavior and symptoms that indicate continued need for restraint- Identify and implement measures to help patient regain control- Assess readiness for release of restraint   Outcome: Progressing     Problem: CARDIOVASCULAR - ADULT  Goal: Maintains optimal cardiac output and hemodynamic stability  Description: INTERVENTIONS:- Monitor I/O, vital signs and rhythm- Monitor for S/S and trends of decreased cardiac output- Administer and titrate ordered vasoactive medications to optimize hemodynamic stability- Assess quality of pulses, skin color and temperature- Assess for signs of decreased coronary artery perfusion- Instruct patient to report change in severity of symptoms  Outcome: Progressing  Goal: Absence of cardiac dysrhythmias or at baseline rhythm  Description: INTERVENTIONS:- Continuous cardiac monitoring, vital signs, obtain 12 lead EKG if ordered- Administer antiarrhythmic and heart rate control medications as ordered- Monitor electrolytes and administer replacement therapy as ordered  Outcome: Progressing     Problem: GASTROINTESTINAL - ADULT  Goal: Maintains or returns to baseline bowel function  Description: INTERVENTIONS:- Assess bowel function- Encourage oral fluids to ensure adequate hydration- Administer IV fluids if ordered to ensure adequate hydration- Administer ordered medications as needed- Encourage mobilization and activity- Consider nutritional services referral to assist patient with adequate nutrition and appropriate food choices  Outcome: Progressing  Goal: Maintains adequate nutritional  intake  Description: INTERVENTIONS:- Monitor percentage of each meal consumed- Identify factors contributing to decreased intake, treat as appropriate- Assist with meals as needed- Monitor I&O, weight, and lab values if indicated- Obtain nutrition services referral as needed  Outcome: Progressing     Problem: GENITOURINARY - ADULT  Goal: Maintains or returns to baseline urinary function  Description: INTERVENTIONS:- Assess urinary function- Encourage oral fluids to ensure adequate hydration if ordered- Administer IV fluids as ordered to ensure adequate hydration- Administer ordered medications as needed- Offer frequent toileting- Follow urinary retention protocol if ordered  Outcome: Progressing  Goal: Absence of urinary retention  Description: INTERVENTIONS:- Assess patient’s ability to void and empty bladder- Monitor I/O- Bladder scan as needed- Discuss with physician/AP medications to alleviate retention as needed- Discuss catheterization for long term situations as appropriate  Outcome: Progressing     Problem: SKIN/TISSUE INTEGRITY - ADULT  Goal: Skin Integrity remains intact(Skin Breakdown Prevention)  Description: Assess:-Perform [place] assessment every shift-Clean and moisturize skin every 2-Inspect skin when repositioning, toileting, and assisting with ADLS-Assess under medical devices such as restraints every 2-Assess extremities for adequate circulation and sensation Bed Management:-Have minimal linens on bed & keep smooth, unwrinkled-Change linens as needed when moist or perspiring-Avoid sitting or lying in one position for more than 2 hours while in bed-Keep HOB at 30degrees Toileting:-Offer bedside commode-Assess for incontinence every 2-Use incontinent care products after each incontinent episode such as proper cleaning equipment Activity:-Mobilize patient 3 times a day-Encourage activity and walks on unit-Encourage or provide ROM exercises -Turn and reposition patient every 2 Hours-Use appropriate  equipment to lift or move patient in bed-Instruct/ Assist with weight shifting every 2 hours  when out of bed in chair-Consider limitation of chair time 2 hour intervalsSkin Care:-Avoid use of baby powder, tape, friction and shearing, hot water or constrictive clothing-Relieve pressure over bony prominences using 2-Do not massage red bony areasNext Steps:-Teach patient strategies to minimize risks such as pressure injuries -  Outcome: Progressing  Goal: Incision(s), wounds(s) or drain site(s) healing without S/S of infection  Description: INTERVENTIONS- Assess and document dressing, incision, wound bed, drain sites and surrounding tissue- Provide patient and family education- Perform skin care/dressing changes as needed  Outcome: Progressing     Problem: HEMATOLOGIC - ADULT  Goal: Maintains hematologic stability  Description: INTERVENTIONS- Assess for signs and symptoms of bleeding or hemorrhage- Monitor labs- Administer supportive blood products/factors as ordered and appropriate  Outcome: Progressing     Problem: MUSCULOSKELETAL - ADULT  Goal: Maintain or return mobility to safest level of function  Description: INTERVENTIONS:- Assess patient's ability to carry out ADLs; assess patient's baseline for ADL function and identify physical deficits which impact ability to perform ADLs (bathing, care of mouth/teeth, toileting, grooming, dressing, etc.)- Assess/evaluate cause of self-care deficits - Assess range of motion- Assess patient's mobility- Assess patient's need for assistive devices and provide as appropriate- Encourage maximum independence but intervene and supervise when necessary- Involve family in performance of ADLs- Assess for home care needs following discharge - Consider OT consult to assist with ADL evaluation and planning for discharge- Provide patient education as appropriate  Outcome: Progressing     Problem: Nutrition/Hydration-ADULT  Goal: Nutrient/Hydration intake appropriate for improving,  restoring or maintaining nutritional needs  Description: Monitor and assess patient's nutrition/hydration status for malnutrition. Collaborate with interdisciplinary team and initiate plan and interventions as ordered.  Monitor patient's weight and dietary intake as ordered or per policy. Utilize nutrition screening tool and intervene as necessary. Determine patient's food preferences and provide high-protein, high-caloric foods as appropriate. INTERVENTIONS:- Monitor oral intake, urinary output, labs, and treatment plans- Assess nutrition and hydration status and recommend course of action- Evaluate amount of meals eaten- Assist patient with eating if necessary - Allow adequate time for meals- Recommend/ encourage appropriate diets, oral nutritional supplements, and vitamin/mineral supplements- Order, calculate, and assess calorie counts as needed- Recommend, monitor, and adjust tube feedings and TPN/PPN based on assessed needs- Assess need for intravenous fluids- Provide specific nutrition/hydration education as appropriate- Include patient/family/caregiver in decisions related to nutrition  Outcome: Progressing     Problem: COPING  Goal: Pt/Family able to verbalize concerns and demonstrate effective coping strategies  Description: INTERVENTIONS:- Assist patient/family to identify coping skills, available support systems and cultural and spiritual values- Provide emotional support, including active listening and acknowledgement of concerns of patient and caregivers- Reduce environmental stimuli, as able- Provide patient education- Assess for spiritual pain/suffering and initiate spiritual care, including notification of Pastoral Care or lucie based community as needed- Assess effectiveness of coping strategies  Outcome: Progressing  Goal: Will report anxiety at manageable levels  Description: INTERVENTIONS:- Administer medication as ordered- Teach and encourage coping skills- Provide emotional support- Assess  patient/family for anxiety and ability to cope  Outcome: Progressing     Problem: Potential for Falls  Goal: Patient will remain free of falls  Description: INTERVENTIONS:- Educate patient/family on patient safety including physical limitations- Instruct patient to call for assistance with activity - Consult OT/PT to assist with strengthening/mobility - Keep Call bell within reach- Keep bed low and locked with side rails adjusted as appropriate- Keep care items and personal belongings within reach- Initiate and maintain comfort rounds- Make Fall Risk Sign visible to staff- Offer Toileting every 2 Hours, in advance of need- Initiate/Maintain bed/chair alarm- Obtain necessary fall risk management equipment.- Apply yellow socks and bracelet for high fall risk patients- Consider moving patient to room near nurses station  INTERVENTIONS:- Educate patient/family on patient safety including physical limitations- Instruct patient to call for assistance with activity - Consult OT/PT to assist with strengthening/mobility - Keep Call bell within reach- Keep bed low and locked with side rails adjusted as appropriate- Keep care items and personal belongings within reach- Initiate and maintain comfort rounds- Make Fall Risk Sign visible to staff- Offer Toileting every 2 Hours, in advance of need- Initiate/Maintain bed alarm- Obtain necessary fall risk management equipment: bracelet, socks, alarms- Apply yellow socks and bracelet for high fall risk patients- Consider moving patient to room near nurses station  Outcome: Progressing

## 2025-04-27 LAB
ALBUMIN SERPL BCG-MCNC: 3.1 G/DL (ref 3.5–5)
ALP SERPL-CCNC: 46 U/L (ref 34–104)
ALT SERPL W P-5'-P-CCNC: 83 U/L (ref 7–52)
ANION GAP SERPL CALCULATED.3IONS-SCNC: 6 MMOL/L (ref 4–13)
AST SERPL W P-5'-P-CCNC: 23 U/L (ref 13–39)
BASOPHILS # BLD AUTO: 0 THOUSANDS/ÂΜL (ref 0–0.1)
BASOPHILS NFR BLD AUTO: 0 % (ref 0–1)
BILIRUB SERPL-MCNC: 1.06 MG/DL (ref 0.2–1)
BUN SERPL-MCNC: 42 MG/DL (ref 5–25)
CALCIUM ALBUM COR SERPL-MCNC: 10.2 MG/DL (ref 8.3–10.1)
CALCIUM SERPL-MCNC: 9.5 MG/DL (ref 8.4–10.2)
CHLORIDE SERPL-SCNC: 106 MMOL/L (ref 96–108)
CO2 SERPL-SCNC: 30 MMOL/L (ref 21–32)
CREAT SERPL-MCNC: 1.6 MG/DL (ref 0.6–1.3)
EOSINOPHIL # BLD AUTO: 0.13 THOUSAND/ÂΜL (ref 0–0.61)
EOSINOPHIL NFR BLD AUTO: 2 % (ref 0–6)
ERYTHROCYTE [DISTWIDTH] IN BLOOD BY AUTOMATED COUNT: 11.6 % (ref 11.6–15.1)
GFR SERPL CREATININE-BSD FRML MDRD: 44 ML/MIN/1.73SQ M
GLUCOSE SERPL-MCNC: 115 MG/DL (ref 65–140)
GLUCOSE SERPL-MCNC: 123 MG/DL (ref 65–140)
GLUCOSE SERPL-MCNC: 142 MG/DL (ref 65–140)
GLUCOSE SERPL-MCNC: 158 MG/DL (ref 65–140)
GLUCOSE SERPL-MCNC: 194 MG/DL (ref 65–140)
GLUCOSE SERPL-MCNC: 229 MG/DL (ref 65–140)
HCT VFR BLD AUTO: 33.9 % (ref 36.5–49.3)
HGB BLD-MCNC: 12 G/DL (ref 12–17)
IMM GRANULOCYTES # BLD AUTO: 0.05 THOUSAND/UL (ref 0–0.2)
IMM GRANULOCYTES NFR BLD AUTO: 1 % (ref 0–2)
LYMPHOCYTES # BLD AUTO: 1.51 THOUSANDS/ÂΜL (ref 0.6–4.47)
LYMPHOCYTES NFR BLD AUTO: 17 % (ref 14–44)
MAGNESIUM SERPL-MCNC: 1.8 MG/DL (ref 1.9–2.7)
MCH RBC QN AUTO: 31.3 PG (ref 26.8–34.3)
MCHC RBC AUTO-ENTMCNC: 35.4 G/DL (ref 31.4–37.4)
MCV RBC AUTO: 89 FL (ref 82–98)
MONOCYTES # BLD AUTO: 0.28 THOUSAND/ÂΜL (ref 0.17–1.22)
MONOCYTES NFR BLD AUTO: 3 % (ref 4–12)
MTX SERPL-SCNC: <0.1 UMOL/L
NEUTROPHILS # BLD AUTO: 6.75 THOUSANDS/ÂΜL (ref 1.85–7.62)
NEUTS SEG NFR BLD AUTO: 77 % (ref 43–75)
NRBC BLD AUTO-RTO: 0 /100 WBCS
PLATELET # BLD AUTO: 48 THOUSANDS/UL (ref 149–390)
PMV BLD AUTO: 10.5 FL (ref 8.9–12.7)
POTASSIUM SERPL-SCNC: 3.8 MMOL/L (ref 3.5–5.3)
PROT SERPL-MCNC: 5.2 G/DL (ref 6.4–8.4)
RBC # BLD AUTO: 3.83 MILLION/UL (ref 3.88–5.62)
SODIUM SERPL-SCNC: 142 MMOL/L (ref 135–147)
WBC # BLD AUTO: 8.72 THOUSAND/UL (ref 4.31–10.16)

## 2025-04-27 PROCEDURE — 85025 COMPLETE CBC W/AUTO DIFF WBC: CPT

## 2025-04-27 PROCEDURE — 99232 SBSQ HOSP IP/OBS MODERATE 35: CPT

## 2025-04-27 PROCEDURE — 82948 REAGENT STRIP/BLOOD GLUCOSE: CPT

## 2025-04-27 PROCEDURE — 80053 COMPREHEN METABOLIC PANEL: CPT

## 2025-04-27 PROCEDURE — 83735 ASSAY OF MAGNESIUM: CPT

## 2025-04-27 RX ORDER — MAGNESIUM SULFATE HEPTAHYDRATE 40 MG/ML
2 INJECTION, SOLUTION INTRAVENOUS ONCE
Status: COMPLETED | OUTPATIENT
Start: 2025-04-27 | End: 2025-04-27

## 2025-04-27 RX ADMIN — Medication 6 MG: at 22:07

## 2025-04-27 RX ADMIN — OLANZAPINE 10 MG: 10 TABLET, FILM COATED ORAL at 22:18

## 2025-04-27 RX ADMIN — OLANZAPINE 2.5 MG: 2.5 TABLET, FILM COATED ORAL at 08:59

## 2025-04-27 RX ADMIN — INSULIN LISPRO 5 UNITS: 100 INJECTION, SOLUTION INTRAVENOUS; SUBCUTANEOUS at 11:52

## 2025-04-27 RX ADMIN — HEPARIN SODIUM 5000 UNITS: 5000 INJECTION INTRAVENOUS; SUBCUTANEOUS at 05:21

## 2025-04-27 RX ADMIN — ATORVASTATIN CALCIUM 20 MG: 20 TABLET, FILM COATED ORAL at 08:59

## 2025-04-27 RX ADMIN — INSULIN LISPRO 10 UNITS: 100 INJECTION, SOLUTION INTRAVENOUS; SUBCUTANEOUS at 16:08

## 2025-04-27 RX ADMIN — PANTOPRAZOLE SODIUM 40 MG: 40 TABLET, DELAYED RELEASE ORAL at 05:21

## 2025-04-27 RX ADMIN — CHLORHEXIDINE GLUCONATE 0.12% ORAL RINSE 15 ML: 1.2 LIQUID ORAL at 22:07

## 2025-04-27 RX ADMIN — ALLOPURINOL 300 MG: 300 TABLET ORAL at 08:59

## 2025-04-27 RX ADMIN — LEVETIRACETAM 500 MG: 500 TABLET, FILM COATED ORAL at 08:59

## 2025-04-27 RX ADMIN — HEPARIN SODIUM 5000 UNITS: 5000 INJECTION INTRAVENOUS; SUBCUTANEOUS at 22:18

## 2025-04-27 RX ADMIN — MAGNESIUM SULFATE HEPTAHYDRATE 2 G: 40 INJECTION, SOLUTION INTRAVENOUS at 09:00

## 2025-04-27 RX ADMIN — CHLORHEXIDINE GLUCONATE 0.12% ORAL RINSE 15 ML: 1.2 LIQUID ORAL at 08:59

## 2025-04-27 RX ADMIN — SENNOSIDES 17.2 MG: 8.6 TABLET, FILM COATED ORAL at 22:07

## 2025-04-27 RX ADMIN — DEXAMETHASONE SODIUM PHOSPHATE 4 MG: 4 INJECTION INTRA-ARTICULAR; INTRALESIONAL; INTRAMUSCULAR; INTRAVENOUS; SOFT TISSUE at 09:00

## 2025-04-27 RX ADMIN — INSULIN GLARGINE 10 UNITS: 100 INJECTION, SOLUTION SUBCUTANEOUS at 22:07

## 2025-04-27 RX ADMIN — HEPARIN SODIUM 5000 UNITS: 5000 INJECTION INTRAVENOUS; SUBCUTANEOUS at 14:05

## 2025-04-27 RX ADMIN — LEVETIRACETAM 500 MG: 500 TABLET, FILM COATED ORAL at 22:07

## 2025-04-27 RX ADMIN — BISACODYL 10 MG: 10 SUPPOSITORY RECTAL at 09:00

## 2025-04-27 RX ADMIN — POLYETHYLENE GLYCOL 3350 17 G: 17 POWDER, FOR SOLUTION ORAL at 09:00

## 2025-04-27 NOTE — ASSESSMENT & PLAN NOTE
Lab Results   Component Value Date    CREATININE 1.60 (H) 04/27/2025    CREATININE 1.55 (H) 04/26/2025    CREATININE 1.77 (H) 04/25/2025       Lab Results   Component Value Date    EGFR 44 04/27/2025    EGFR 46 04/26/2025    EGFR 39 04/25/2025   Creatinine continues to uptrend.  Clear in the setting of hypotension episode and methotrexate toxicity  Continue IV fluids per nephrology

## 2025-04-27 NOTE — ASSESSMENT & PLAN NOTE
"Patient with development of worsening confusion, recently seen at the [facility] ED 3/24/2025 with CT head at that time with finding of brain lesion for which MRI was advised to exclude cancerous etiology.     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  Lymphoma/leukemia panel nondiagnostic  CT head on 4/3 with interval increase in ventricular caliber concerning for worsening hydrocephalus  No indication for AED per neurosurgery and holding off steroids until Dx achieved  Continue neurochecks   Neurosurgery and oncology recommended repeat high-volume LP as previous was nondiagnostic  Post LP 4/7 which was again undiagnostic.   MRI of the brain from 4/11-\"Interval enlargement of the dominant left anterior basal ganglionic mass lesion with greater surrounding vasogenic edema and mass effect. 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 Differential considerations include lymphoma, multicentric high-grade glioma, and less likely metastasis\"  S/p brain bx 4/14 by neurosurgery   Nondiagnostic LPs  Patient pulled out EVD over the weekend  Discussed with hematology oncology on plan for Rituxan every week.  Will need to get the rehab facility does get accommodate for this in the outpatient setting.  Will need inpatient methotrexate every 4 weeks. Still finalizing treatment plan  Discussed with case management and will be " a difficult discharge due due to difficulty of placement since patient will need chemo and only few places can accept  Last rituximab dose on 4/24/2025.  Last metotrexate 4/18  Discussed with heme-onc and palliative, will start weaning down on Decadron.  Unfortunately will need to be off of restraints for 24 hours prior to discharge.  Will trial off restraints today  Patient likely delirious from the Benadryl as well as the steroids that were given  Weaning off of steroids and discontinued the Benadryl  Delirium protocol  Will discontinue the Zyprexa during the day and continue the Zyprexa at night

## 2025-04-27 NOTE — PLAN OF CARE
Problem: PAIN - ADULT  Goal: Verbalizes/displays adequate comfort level or baseline comfort level  Description: Interventions:- Encourage patient to monitor pain and request assistance- Assess pain using appropriate pain scale- Administer analgesics based on type and severity of pain and evaluate response- Implement non-pharmacological measures as appropriate and evaluate response- Notify physician/advanced practitioner if interventions unsuccessful or patient reports new pain  Outcome: Progressing     Problem: INFECTION - ADULT  Goal: Absence or prevention of progression during hospitalization  Description: INTERVENTIONS:- Assess and monitor for signs and symptoms of infection- Monitor lab/diagnostic results- Monitor all insertion sites, i.e. indwelling lines, tubes, and drains- [place] appropriate cooling/warming therapies per order- Administer medications as ordered- Instruct and encourage patient and family to use good hand hygiene technique- Identify and instruct in appropriate isolation precautions for identified infection/condition  Outcome: Progressing     Problem: SAFETY ADULT  Goal: Patient will remain free of falls  Description: INTERVENTIONS:- Educate patient/family on patient safety including physical limitations- Instruct patient to call for assistance with activity - Consult OT/PT to assist with strengthening/mobility - Keep Call bell within reach- Keep bed low and locked with side rails adjusted as appropriate- Keep care items and personal belongings within reach- Initiate and maintain comfort rounds- Make Fall Risk Sign visible to staff- Offer Toileting every 2 Hours, in advance of need- Initiate/Maintain bed/chair alarm- Obtain necessary fall risk management equipment.- Apply yellow socks and bracelet for high fall risk patients- Consider moving patient to room near nurses station  INTERVENTIONS:- Educate patient/family on patient safety including physical limitations- Instruct patient to call  for assistance with activity - Consult OT/PT to assist with strengthening/mobility - Keep Call bell within reach- Keep bed low and locked with side rails adjusted as appropriate- Keep care items and personal belongings within reach- Initiate and maintain comfort rounds- Make Fall Risk Sign visible to staff- Offer Toileting every 2 Hours, in advance of need- Initiate/Maintain bed alarm- Obtain necessary fall risk management equipment: bracelet, socks, alarms- Apply yellow socks and bracelet for high fall risk patients- Consider moving patient to room near nurses station  Outcome: Progressing     Problem: DISCHARGE PLANNING  Goal: Discharge to home or other facility with appropriate resources  Description: INTERVENTIONS:- Identify barriers to discharge w/patient and caregiver- Arrange for needed discharge resources and transportation as appropriate- Identify discharge learning needs (meds, wound care, etc.)- Refer to Case Management Department for coordinating discharge planning if the patient needs post-hospital services based on physician/advanced practitioner order or complex needs related to functional status, cognitive ability, or social support system  Outcome: Progressing     Problem: Knowledge Deficit  Goal: Patient/family/caregiver demonstrates understanding of disease process, treatment plan, medications, and discharge instructions  Description: Complete learning assessment and assess knowledge base.Interventions:- Provide teaching at level of understanding- Provide teaching via preferred learning methods  Outcome: Progressing     Problem: NEUROSENSORY - ADULT  Goal: Achieves stable or improved neurological status  Description: INTERVENTIONS- Monitor and report changes in neurological status- Monitor vital signs such as temperature, blood pressure, glucose, and any other labs ordered - Initiate measures to prevent increased intracranial pressure- Monitor for seizure activity and implement precautions if  appropriate    INTERVENTIONS- Monitor and report changes in neurological status- Monitor vital signs such as temperature, blood pressure, glucose, and any other labs ordered - Initiate measures to prevent increased intracranial pressure- Monitor for seizure activity and implement precautions if appropriate    Outcome: Progressing     Problem: METABOLIC, FLUID AND ELECTROLYTES - ADULT  Goal: Glucose maintained within target range  Description: INTERVENTIONS:- Monitor Blood Glucose as ordered- Assess for signs and symptoms of hyperglycemia and hypoglycemia- Administer ordered medications to maintain glucose within target range- Assess nutritional intake and initiate nutrition service referral as needed  INTERVENTIONS:- Monitor Blood Glucose as ordered- Assess for signs and symptoms of hyperglycemia and hypoglycemia- Administer ordered medications to maintain glucose within target range- Assess nutritional intake and initiate nutrition service referral as needed  Outcome: Progressing     Problem: GASTROINTESTINAL - ADULT  Goal: Maintains adequate nutritional intake  Description: INTERVENTIONS:- Monitor percentage of each meal consumed- Identify factors contributing to decreased intake, treat as appropriate- Assist with meals as needed- Monitor I&O, weight, and lab values if indicated- Obtain nutrition services referral as needed  Outcome: Progressing     Problem: GENITOURINARY - ADULT  Goal: Maintains or returns to baseline urinary function  Description: INTERVENTIONS:- Assess urinary function- Encourage oral fluids to ensure adequate hydration if ordered- Administer IV fluids as ordered to ensure adequate hydration- Administer ordered medications as needed- Offer frequent toileting- Follow urinary retention protocol if ordered  Outcome: Progressing     Problem: SKIN/TISSUE INTEGRITY - ADULT  Goal: Skin Integrity remains intact(Skin Breakdown Prevention)  Description: Assess:-Perform [place] assessment every shift-Clean  and moisturize skin every 2-Inspect skin when repositioning, toileting, and assisting with ADLS-Assess under medical devices such as restraints every 2-Assess extremities for adequate circulation and sensation Bed Management:-Have minimal linens on bed & keep smooth, unwrinkled-Change linens as needed when moist or perspiring-Avoid sitting or lying in one position for more than 2 hours while in bed-Keep HOB at 30degrees Toileting:-Offer bedside commode-Assess for incontinence every 2-Use incontinent care products after each incontinent episode such as proper cleaning equipment Activity:-Mobilize patient 3 times a day-Encourage activity and walks on unit-Encourage or provide ROM exercises -Turn and reposition patient every 2 Hours-Use appropriate equipment to lift or move patient in bed-Instruct/ Assist with weight shifting every 2 hours  when out of bed in chair-Consider limitation of chair time 2 hour intervalsSkin Care:-Avoid use of baby powder, tape, friction and shearing, hot water or constrictive clothing-Relieve pressure over bony prominences using 2-Do not massage red bony areasNext Steps:-Teach patient strategies to minimize risks such as pressure injuries -  Outcome: Progressing     Problem: SAFETY,RESTRAINT: NV/NON-SELF DESTRUCTIVE BEHAVIOR  Goal: Remains free of harm/injury (restraint for non violent/non self-detsructive behavior)  Description: INTERVENTIONS:- Instruct patient/family regarding restraint use - Assess and monitor physiologic and psychological status - Provide interventions and comfort measures to meet assessed patient needs - Identify and implement measures to help patient regain control- Assess readiness for release of restraint   Outcome: Progressing     Problem: SAFETY,RESTRAINT: NV/NON-SELF DESTRUCTIVE BEHAVIOR  Goal: Returns to optimal restraint-free functioning  Description: INTERVENTIONS:- Assess the patient's behavior and symptoms that indicate continued need for restraint- Identify  and implement measures to help patient regain control- Assess readiness for release of restraint   Outcome: Progressing     Problem: Nutrition/Hydration-ADULT  Goal: Nutrient/Hydration intake appropriate for improving, restoring or maintaining nutritional needs  Description: Monitor and assess patient's nutrition/hydration status for malnutrition. Collaborate with interdisciplinary team and initiate plan and interventions as ordered.  Monitor patient's weight and dietary intake as ordered or per policy. Utilize nutrition screening tool and intervene as necessary. Determine patient's food preferences and provide high-protein, high-caloric foods as appropriate. INTERVENTIONS:- Monitor oral intake, urinary output, labs, and treatment plans- Assess nutrition and hydration status and recommend course of action- Evaluate amount of meals eaten- Assist patient with eating if necessary - Allow adequate time for meals- Recommend/ encourage appropriate diets, oral nutritional supplements, and vitamin/mineral supplements- Order, calculate, and assess calorie counts as needed- Recommend, monitor, and adjust tube feedings and TPN/PPN based on assessed needs- Assess need for intravenous fluids- Provide specific nutrition/hydration education as appropriate- Include patient/family/caregiver in decisions related to nutrition  Outcome: Progressing     Problem: Potential for Falls  Goal: Patient will remain free of falls  Description: INTERVENTIONS:- Educate patient/family on patient safety including physical limitations- Instruct patient to call for assistance with activity - Consult OT/PT to assist with strengthening/mobility - Keep Call bell within reach- Keep bed low and locked with side rails adjusted as appropriate- Keep care items and personal belongings within reach- Initiate and maintain comfort rounds- Make Fall Risk Sign visible to staff- Offer Toileting every 2 Hours, in advance of need- Initiate/Maintain bed/chair alarm-  Obtain necessary fall risk management equipment.- Apply yellow socks and bracelet for high fall risk patients- Consider moving patient to room near nurses station  INTERVENTIONS:- Educate patient/family on patient safety including physical limitations- Instruct patient to call for assistance with activity - Consult OT/PT to assist with strengthening/mobility - Keep Call bell within reach- Keep bed low and locked with side rails adjusted as appropriate- Keep care items and personal belongings within reach- Initiate and maintain comfort rounds- Make Fall Risk Sign visible to staff- Offer Toileting every 2 Hours, in advance of need- Initiate/Maintain bed alarm- Obtain necessary fall risk management equipment: bracelet, socks, alarms- Apply yellow socks and bracelet for high fall risk patients- Consider moving patient to room near nurses station  Outcome: Progressing

## 2025-04-27 NOTE — PROGRESS NOTES
"Discussed with progress Note - Hospitalist   Name: Alexis Dennison 64 y.o. male I MRN: [id number]  Unit/Bed#: [facility] 904-01 I Date of Admission: 3/29/2025   Date of Service: 4/27/2025 I Hospital Day: 29     Assessment & Plan  Brain tumor (HCC)  Patient with development of worsening confusion, recently seen at the [facility] ED 3/24/2025 with CT head at that time with finding of brain lesion for which MRI was advised to exclude cancerous etiology.     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  Lymphoma/leukemia panel nondiagnostic  CT head on 4/3 with interval increase in ventricular caliber concerning for worsening hydrocephalus  No indication for AED per neurosurgery and holding off steroids until Dx achieved  Continue neurochecks   Neurosurgery and oncology recommended repeat high-volume LP as previous was nondiagnostic  Post LP 4/7 which was again undiagnostic.   MRI of the brain from 4/11-\"Interval enlargement of the dominant left anterior basal ganglionic mass lesion with greater surrounding vasogenic edema and mass effect. 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 Differential considerations include lymphoma, multicentric high-grade glioma, and less likely metastasis\"  S/p brain bx 4/14 by neurosurgery   Nondiagnostic LPs  Patient pulled out EVD over the weekend  Discussed with hematology oncology on plan for " Rituxan every week.  Will need to get the rehab facility does get accommodate for this in the outpatient setting.  Will need inpatient methotrexate every 4 weeks. Still finalizing treatment plan  Discussed with case management and will be a difficult discharge due due to difficulty of placement since patient will need chemo and only few places can accept  Last rituximab dose on 4/24/2025.  Last metotrexate 4/18  Discussed with heme-onc and palliative, will start weaning down on Decadron.  Unfortunately will need to be off of restraints for 24 hours prior to discharge.  Will trial off restraints today  Patient likely delirious from the Benadryl as well as the steroids that were given  Weaning off of steroids and discontinued the Benadryl  Delirium protocol  Will discontinue the Zyprexa during the day and continue the Zyprexa at night    LETY (acute kidney injury) ([facility])  Lab Results   Component Value Date    CREATININE 1.60 (H) 04/27/2025    CREATININE 1.55 (H) 04/26/2025    CREATININE 1.77 (H) 04/25/2025       Lab Results   Component Value Date    EGFR 44 04/27/2025    EGFR 46 04/26/2025    EGFR 39 04/25/2025   Creatinine continues to uptrend.  Clear in the setting of hypotension episode and methotrexate toxicity  Continue IV fluids per nephrology    Encephalopathy  Worsening, patient is more lethargic today  Due to brain mass as above  Patient with acute onset confusion, forgetting names/places, oriented to self only most of the times  Delirium precautions  Patient has improving encephalopathy according to the daughter.    Type 2 diabetes mellitus with hyperglycemia, without long-term current use of insulin ([facility])  Has been well-controlled as outpatient with hemoglobin A1c of 6.6%  Hold home oral medications, start correctional insulin coverage   Accu-Cheks reviewed and acceptable  Continue hypoglycemia protocol and carb controlled diet  Mixed hyperlipidemia  Continue statin  HTN, goal below 130/80  Continue losartan  and hydrochlorothiazide  BP reviewed and acceptable  Liver lesion  MRI obtained: No suspicious hepatic lesions.  There is a simple right hepatic lobe cyst.  Mild diffuse hepatic steatosis.  LFTs stable  Outpatient follow up advised  Thyroid nodule  Incidental findings on CT scan  Nonemergent thyroid ultrasound for follow-up in the outpatient setting  Pulmonary nodule  Imaging with 4 mm right lower lobe pulmonary nodule.  CT chest 3-month follow-up  Constipation  Continue senna, MiraLAX and suppository  Ambulate patient as able  Ambulatory dysfunction  PT/OT recommending level 2 at discharge  Awaiting medically stability  Ambulate patient as able  Fall precautions  Goals of care, counseling/discussion    Palliative care encounter    Primary CNS lymphoma  Plan as above  Metabolic alkalosis      VTE Pharmacologic Prophylaxis: VTE Score: 5 High Risk (Score >/= 5) - Pharmacological DVT Prophylaxis Ordered: heparin. Sequential Compression Devices Ordered.    Mobility:   Basic Mobility Inpatient Raw Score: 13  JH-HLM Goal: 4: Move to chair/commode  JH-HLM Achieved: 6: Walk 10 steps or more  JH-HLM Goal NOT achieved. Continue with multidisciplinary rounding and encourage appropriate mobility to improve upon JH-HLM goals.    Patient Centered Rounds: I performed bedside rounds with nursing staff today.   Discussions with Specialists or Other Care Team Provider: Palliative, nephrology    Education and Discussions with Family / Patient: Updated  (daughter) via phone.    Current Length of Stay: 29 day(s)  Current Patient Status: Inpatient   Certification Statement: The patient will continue to require additional inpatient hospital stay due to placement.  Discharge Plan: Anticipate discharge in 24-48 hrs to rehab facility.    Code Status: Level 1 - Full Code    Subjective   No complaints at this time.    Objective :  Temp:  [96.9 °F (36.1 °C)-97.9 °F (36.6 °C)] 96.9 °F (36.1 °C)  HR:  [54-60] 60  BP:  (115-121)/(73-75) 121/73  Resp:  [18] 18  SpO2:  [95 %-97 %] 97 %  O2 Device: None (Room air)    Body mass index is 24.67 kg/m².     Input and Output Summary (last 24 hours):     Intake/Output Summary (Last 24 hours) at 4/27/2025 0948  Last data filed at 4/27/2025 0909  Gross per 24 hour   Intake 2095 ml   Output 4475 ml   Net -2380 ml       Physical Exam  Vitals and nursing note reviewed.   Constitutional:       Appearance: He is well-developed and normal weight.   HENT:      Head: Normocephalic and atraumatic.      Nose: Nose normal.      Mouth/Throat:      Mouth: Mucous membranes are moist.   Eyes:      Conjunctiva/sclera: Conjunctivae normal.   Cardiovascular:      Rate and Rhythm: Normal rate and regular rhythm.      Heart sounds: Normal heart sounds. No murmur heard.  Pulmonary:      Effort: Pulmonary effort is normal. No respiratory distress.      Breath sounds: Normal breath sounds.   Abdominal:      General: Abdomen is flat.      Palpations: Abdomen is soft.      Tenderness: There is no abdominal tenderness.   Musculoskeletal:         General: No swelling.      Cervical back: Neck supple.      Right lower leg: No edema.      Left lower leg: No edema.   Skin:     General: Skin is warm and dry.      Capillary Refill: Capillary refill takes less than 2 seconds.   Neurological:      General: No focal deficit present.      Mental Status: He is alert and oriented to person, place, and time. Mental status is at baseline.   Psychiatric:         Mood and Affect: Mood normal.           Lines/Drains:  Lines/Drains/Airways       Active Status       Name Placement date Placement time Site Days    PICC Line 04/16/25 Right Basilic 04/16/25  1526  Basilic  10    External Urinary Catheter Medium 04/24/25  1000  -- 2                    Central Line:  Goal for removal: Will discontinue when hemodynamically stable.               Lab Results: I have reviewed the following results:   Results from last 7 days   Lab Units  04/27/25  0524   WBC Thousand/uL 8.72   HEMOGLOBIN g/dL 12.0   HEMATOCRIT % 33.9*   PLATELETS Thousands/uL 48*   SEGS PCT % 77*   LYMPHO PCT % 17   MONO PCT % 3*   EOS PCT % 2     Results from last 7 days   Lab Units 04/27/25  0524   SODIUM mmol/L 142   POTASSIUM mmol/L 3.8   CHLORIDE mmol/L 106   CO2 mmol/L 30   BUN mg/dL 42*   CREATININE mg/dL 1.60*   ANION GAP mmol/L 6   CALCIUM mg/dL 9.5   ALBUMIN g/dL 3.1*   TOTAL BILIRUBIN mg/dL 1.06*   ALK PHOS U/L 46   ALT U/L 83*   AST U/L 23   GLUCOSE RANDOM mg/dL 115         Results from last 7 days   Lab Units 04/27/25  0738 04/26/25  2042 04/26/25  1546 04/26/25  1126 04/26/25  0739 04/25/25  2339 04/25/25  2029 04/25/25  1616 04/25/25  1109 04/25/25  0733 04/24/25  2039 04/24/25  1626   POC GLUCOSE mg/dl 123 75 239* 149* 70 166* 172* 140 171* 145* 218* 166*               Recent Cultures (last 7 days):           Last 24 Hours Medication List:     Current Facility-Administered Medications:     acetaminophen (TYLENOL) tablet 650 mg, Q6H PRN    allopurinol (ZYLOPRIM) tablet 300 mg, Daily    alteplase (CATHFLO) injection 2 mg, Q1MIN PRN    alteplase (CATHFLO) injection 2 mg, Q1MIN PRN    aluminum-magnesium hydroxide-simethicone (MAALOX) oral suspension 30 mL, Q4H PRN    atorvastatin (LIPITOR) tablet 20 mg, Daily    bisacodyl (DULCOLAX) rectal suppository 10 mg, Daily    calcium carbonate (TUMS) chewable tablet 1,000 mg, Daily PRN    chlorhexidine (PERIDEX) 0.12 % oral rinse 15 mL, Q12H CAIT    [START ON 4/28/2025] dexamethasone (DECADRON) injection 2 mg, Once    heparin (porcine) subcutaneous injection 5,000 Units, Q8H CAIT    HYDROmorphone HCl (DILAUDID) injection 0.2 mg, Q2H PRN    insulin glargine (LANTUS) subcutaneous injection 10 Units 0.1 mL, HS    insulin lispro (HumALOG/ADMELOG) 100 units/mL subcutaneous injection 5-25 Units, TID AC **AND** Fingerstick Glucose (POCT), TID AC    insulin lispro (HumALOG/ADMELOG) 100 units/mL subcutaneous injection 5-25 Units, HS     lactated ringers infusion, Continuous, Last Rate: 75 mL/hr (04/26/25 1935)    levETIRAcetam (KEPPRA) tablet 500 mg, Q12H CAIT    magnesium sulfate 2 g/50 mL IVPB (premix) 2 g, Once, Last Rate: 2 g (04/27/25 0900)    melatonin tablet 6 mg, HS    OLANZapine (ZyPREXA) IM injection 5 mg, Q8H PRN    OLANZapine (ZyPREXA) tablet 10 mg, HS    OLANZapine (ZyPREXA) tablet 2.5 mg, QAM    ondansetron (ZOFRAN) injection 4 mg, Q6H PRN    oxyCODONE (ROXICODONE) split tablet 2.5 mg, Q4H PRN **OR** oxyCODONE (ROXICODONE) IR tablet 5 mg, Q4H PRN    pantoprazole (PROTONIX) EC tablet 40 mg, Early Morning    polyethylene glycol (MIRALAX) packet 17 g, Daily    senna (SENOKOT) tablet 17.2 mg, HS    sodium chloride 0.9 % infusion, Once PRN, Last Rate: Stopped (04/17/25 2051)    sodium chloride 0.9 % infusion, Once PRN    sodium chloride 0.9 % infusion, Once PRN    Administrative Statements   Today, Patient Was Seen By: Rustam Drummond MD  I have spent a total time of 40 minutes in caring for this patient on the day of the visit/encounter including Diagnostic results, Prognosis, Risks and benefits of tx options, Instructions for management, Patient and family education, Importance of tx compliance, Risk factor reductions, Impressions, Counseling / Coordination of care, Documenting in the medical record, Reviewing/placing orders in the medical record (including tests, medications, and/or procedures), Obtaining or reviewing history  , and Communicating with other healthcare professionals .    **Please Note: This note may have been constructed using a voice recognition system.**

## 2025-04-28 LAB
ANION GAP SERPL CALCULATED.3IONS-SCNC: 6 MMOL/L (ref 4–13)
BUN SERPL-MCNC: 43 MG/DL (ref 5–25)
CALCIUM SERPL-MCNC: 9.5 MG/DL (ref 8.4–10.2)
CHLORIDE SERPL-SCNC: 105 MMOL/L (ref 96–108)
CO2 SERPL-SCNC: 30 MMOL/L (ref 21–32)
CREAT SERPL-MCNC: 1.68 MG/DL (ref 0.6–1.3)
ERYTHROCYTE [DISTWIDTH] IN BLOOD BY AUTOMATED COUNT: 11.6 % (ref 11.6–15.1)
GFR SERPL CREATININE-BSD FRML MDRD: 41 ML/MIN/1.73SQ M
GLUCOSE SERPL-MCNC: 115 MG/DL (ref 65–140)
GLUCOSE SERPL-MCNC: 118 MG/DL (ref 65–140)
GLUCOSE SERPL-MCNC: 120 MG/DL (ref 65–140)
GLUCOSE SERPL-MCNC: 161 MG/DL (ref 65–140)
GLUCOSE SERPL-MCNC: 188 MG/DL (ref 65–140)
HCT VFR BLD AUTO: 35.8 % (ref 36.5–49.3)
HGB BLD-MCNC: 12.6 G/DL (ref 12–17)
MCH RBC QN AUTO: 31.4 PG (ref 26.8–34.3)
MCHC RBC AUTO-ENTMCNC: 35.2 G/DL (ref 31.4–37.4)
MCV RBC AUTO: 89 FL (ref 82–98)
PLATELET # BLD AUTO: 53 THOUSANDS/UL (ref 149–390)
PMV BLD AUTO: 10.5 FL (ref 8.9–12.7)
POTASSIUM SERPL-SCNC: 3.8 MMOL/L (ref 3.5–5.3)
RBC # BLD AUTO: 4.01 MILLION/UL (ref 3.88–5.62)
SODIUM SERPL-SCNC: 141 MMOL/L (ref 135–147)
WBC # BLD AUTO: 7.63 THOUSAND/UL (ref 4.31–10.16)

## 2025-04-28 PROCEDURE — 82948 REAGENT STRIP/BLOOD GLUCOSE: CPT

## 2025-04-28 PROCEDURE — 99233 SBSQ HOSP IP/OBS HIGH 50: CPT | Performed by: PHYSICIAN ASSISTANT

## 2025-04-28 PROCEDURE — 85027 COMPLETE CBC AUTOMATED: CPT

## 2025-04-28 PROCEDURE — 80048 BASIC METABOLIC PNL TOTAL CA: CPT

## 2025-04-28 PROCEDURE — 99024 POSTOP FOLLOW-UP VISIT: CPT | Performed by: NEUROLOGICAL SURGERY

## 2025-04-28 PROCEDURE — 99232 SBSQ HOSP IP/OBS MODERATE 35: CPT

## 2025-04-28 RX ORDER — WATER 10 ML/10ML
INJECTION INTRAMUSCULAR; INTRAVENOUS; SUBCUTANEOUS
Status: COMPLETED
Start: 2025-04-28 | End: 2025-04-28

## 2025-04-28 RX ADMIN — INSULIN GLARGINE 10 UNITS: 100 INJECTION, SOLUTION SUBCUTANEOUS at 21:24

## 2025-04-28 RX ADMIN — WATER 10 ML: 1 INJECTION INTRAMUSCULAR; INTRAVENOUS; SUBCUTANEOUS at 18:11

## 2025-04-28 RX ADMIN — BISACODYL 10 MG: 10 SUPPOSITORY RECTAL at 08:03

## 2025-04-28 RX ADMIN — OLANZAPINE 5 MG: 10 INJECTION, POWDER, FOR SOLUTION INTRAMUSCULAR at 18:11

## 2025-04-28 RX ADMIN — INSULIN LISPRO 5 UNITS: 100 INJECTION, SOLUTION INTRAVENOUS; SUBCUTANEOUS at 11:18

## 2025-04-28 RX ADMIN — LEVETIRACETAM 500 MG: 500 TABLET, FILM COATED ORAL at 21:26

## 2025-04-28 RX ADMIN — ATORVASTATIN CALCIUM 20 MG: 20 TABLET, FILM COATED ORAL at 08:03

## 2025-04-28 RX ADMIN — HEPARIN SODIUM 5000 UNITS: 5000 INJECTION INTRAVENOUS; SUBCUTANEOUS at 21:26

## 2025-04-28 RX ADMIN — HEPARIN SODIUM 5000 UNITS: 5000 INJECTION INTRAVENOUS; SUBCUTANEOUS at 05:32

## 2025-04-28 RX ADMIN — DEXAMETHASONE SODIUM PHOSPHATE 2 MG: 4 INJECTION INTRA-ARTICULAR; INTRALESIONAL; INTRAMUSCULAR; INTRAVENOUS; SOFT TISSUE at 08:02

## 2025-04-28 RX ADMIN — INSULIN LISPRO 5 UNITS: 100 INJECTION, SOLUTION INTRAVENOUS; SUBCUTANEOUS at 17:26

## 2025-04-28 RX ADMIN — PANTOPRAZOLE SODIUM 40 MG: 40 TABLET, DELAYED RELEASE ORAL at 05:32

## 2025-04-28 RX ADMIN — POLYETHYLENE GLYCOL 3350 17 G: 17 POWDER, FOR SOLUTION ORAL at 08:03

## 2025-04-28 RX ADMIN — OLANZAPINE 10 MG: 10 TABLET, FILM COATED ORAL at 21:30

## 2025-04-28 RX ADMIN — ALLOPURINOL 300 MG: 300 TABLET ORAL at 08:03

## 2025-04-28 RX ADMIN — CHLORHEXIDINE GLUCONATE 0.12% ORAL RINSE 15 ML: 1.2 LIQUID ORAL at 08:03

## 2025-04-28 RX ADMIN — SENNOSIDES 17.2 MG: 8.6 TABLET, FILM COATED ORAL at 21:26

## 2025-04-28 RX ADMIN — Medication 6 MG: at 21:29

## 2025-04-28 RX ADMIN — LEVETIRACETAM 500 MG: 500 TABLET, FILM COATED ORAL at 08:03

## 2025-04-28 NOTE — ASSESSMENT & PLAN NOTE
Appreciate oncology input  methotrexate and rituximab started this admission  Oncology considering 4 week methotrexate cycle to allow more time for rehab/recovery before next dose  Weekly rituximab

## 2025-04-28 NOTE — PROGRESS NOTES
"Progress Note - Palliative Care   Name: Alexis Dennison 64 y.o. male I MRN: [id number]  [facility] I Date of Admission: 3/29/2025   Date of Service: 4/28/2025 I Hospital Day: 30     Assessment & Plan  Palliative care encounter  Palliative diagnosis: newly diagnosed large B cell lymphoma    Symptom management:  Olanzapine 10mg HS, AM dose d/c, additional PRN IM olanzapine available but not used since 1am on 4/26   Consider replacing HS olanzapine with HS seroquel if no improvement   Goal to wean patient from restraints to enable therapy/discharge planning  Decadron d/c by primary  oxyIR 2.5-5mg PO Q4H PRN moderate-severe pain  Dilaudid 0.2mg IV Q2H PRN BT pain  Acetaminophen 650mg Q6H PRN mild pain  Bowel regimen in place    Goals:  Level 1 code status  Disease focused care without limits placed  Patient initiated high-dose methotrexate + rituximab inpatient  Current focus on rehabilitation, hopeful for mid week discharge to [facility]    Decisional apparatus:  Patient does not have capacity on exam today.  If capacity is lost, patient's substitute decision maker would default to spouse and adult children by [place] Act 169.  ER contacts:  Elizabeth Chester  Daughter  [phone]  Guy Hitchcock \"Naldo\" Phillip  Spouse  [phone]    Patient also has a son, Drew, and another daughter, Marina, who has down syndrome. Elizabeth is the main contact but family makes decisions as unit.    Advance Directive/Living Will/POLST: none on file    Social support:  Patient support system: spouse Naldo, 3 adult children, extended family.  Spouse Naldo lives locally and daughter Elizabeth is from [place] but Naldo prefers that she is primary contact due to language barrier (Chinese)  4/22: Met with Naldo along with palliative SW (Shikha and Brynn) and  (Kaylee #[id number]).  Provided medical update, supportive listening, answered questions to her satisfaction  Naldo asks for oncology feedback on prognosis, this was relayed to their team  Supportive listening " provided plan for visit outside later today  Normalized experience of patient/family  Provided anticipatory guidance  Advocated for patient/family with interdisciplinary care team    Coordination of care:  Reviewed case with RN, primary team    Follow up  Palliative Care will continue to follow  Please reach out via [employer] secure chat if questions or concerns arise.    We appreciate the invitation to be involved in this patient's care.   Brain tumor (HCC)  Admitted 3/29 with worsening confusion in the setting of recent ED visit with CTA head and neck 3/24 demonstrating multiple nonspecific hyperdense ependymal/subependymal nodules, largest located adjacent to the foramen of Monro (2 x 1.1 x 1 cm ) and f/u outpatient MRI 3/26 demonstrating multiple scattered foci of subependymal nodular enhancement throughout ventricles (left worse than right) with mild hydrocephalus (left worse than right), scattered nodular areas of enhancement in left anterior inferior basal ganglia involving left anterior commissure, and smaller foci of nodular enhancement along anteromedial aspect of left cerebral peduncle and left quirino.with concern for primary CNS malignancy  Per sister, both patient's parents with history of lymphoma  CTH on admission 3/29 stable subependymal nodules and left foramen of Monro region hyperdense lesion, dilation of left lateral ventricle and minimal left to right midline shift  4/3 imaging concerning for worsening hydrocephalus  2 non diagnostic lumbar punctures performed  MRI brain 4/11 with interval disease progression  4/13 developed worsening mentation and CTH demonstrated predominantly left sided obstructive hydrocephalus from lesion located adjacent to the foramen of Monro, now s/p left front EVD  4/14 underwent left frontal endoscopic biopsy and replacement of EVD  Preliminary pathology large B-cell lymphoma    Medical oncology following  Primary CNS lymphoma  Appreciate oncology input  methotrexate and  rituximab started this admission  Oncology considering 4 week methotrexate cycle to allow more time for rehab/recovery before next dose  Weekly rituximab  Encephalopathy  In the setting of the above  On exam, he is confused but interactive. Oriented to name only, does not recall , disoriented to all else.  EVD dislodged due to  agitation while admitted to ICU but did not need to be replaced secondary to stable ventricle size  Off restraints, on 1:1.  Pulmonary nodule  CTA 3/29 demonstrates non specific 4 mm right lower lobe pulmonary nodule  Recommended 3 mo f/u  Liver lesion  CTA 3/29 demonstrates non specific 12 mm hypodense lesion within right hepatic lobe, MRI recommended  MRI 3/30 demonstrates no suspicious hepatic lesions, simple right hepatic lobe cyst noted    LETY (acute kidney injury) (HCC)  Nephrology following. Likely secondary to methotrexate and hypotensive episode. Continue to monitor. Leucovorin for supra therapeutic methotrexate levels completed  Metabolic alkalosis    Interval history:       Patient has been off restraints but remains on 1:1, no PRN olanzapine since 0100 on . Patient has been intermittently impulsive. Ripped off condom catheter and hospital bracelet this morning. During time of encounter was calm. Discussed with RN and  PCA regarding maximizing movement. Discussed consideration of rotating olanzapine to seroquel with SLIM. Will reconsider if no improvement following steroid wean.     MEDICATIONS / ALLERGIES:     all current active meds have been reviewed    No Known Allergies    OBJECTIVE:    Physical Exam  Physical Exam  HENT:      Head: Normocephalic and atraumatic.   Eyes:      Conjunctiva/sclera: Conjunctivae normal.   Cardiovascular:      Rate and Rhythm: Normal rate.   Pulmonary:      Effort: No respiratory distress.   Abdominal:      Tenderness: There is no guarding.   Musculoskeletal:         General: No swelling.   Skin:     General: Skin is warm and dry.    Neurological:      Mental Status: He is alert.      Comments: Oriented to self only   Psychiatric:      Comments: Calm during time of encounter. 1:1 at bedside         Lab Results:   Results from last 7 days   Lab Units 04/28/25 0721 04/27/25 0524 04/26/25  0551 04/25/25  0501   WBC Thousand/uL 7.63 8.72 9.58 5.99   HEMOGLOBIN g/dL 12.6 12.0 12.1 11.6*   HEMATOCRIT % 35.8* 33.9* 35.2* 33.5*   PLATELETS Thousands/uL 53* 48* 61* 81*   SEGS PCT %  --  77* 80* 86*   MONO PCT %  --  3* 3* 3*   EOS PCT %  --  2 1 0     Results from last 7 days   Lab Units 04/28/25 0721 04/27/25 0524 04/26/25  0551 04/25/25  0501   POTASSIUM mmol/L 3.8 3.8 3.9 4.1   CHLORIDE mmol/L 105 106 107 104   CO2 mmol/L 30 30 29 33*   BUN mg/dL 43* 42* 45* 51*   CREATININE mg/dL 1.68* 1.60* 1.55* 1.77*   CALCIUM mg/dL 9.5 9.5 9.4 9.1   ALK PHOS U/L  --  46 41 41   ALT U/L  --  83* 101* 89*   AST U/L  --  23 39 38       Imaging Studies: reviewed pertinent studies   EKG, Pathology, and Other Studies: reviewed pertinent studies    Counseling / Coordination of Care    Total floor / unit time spent today 25+ minutes. Greater than 50% of total time was spent with the patient and / or family counseling and / or coordination of care. A description of the counseling / coordination of care: symptom assessment and management, medication review, psychosocial support, chart review, supportive listening, anticipatory guidance, and coordination with primary team and RN.

## 2025-04-28 NOTE — PROGRESS NOTES
Patient:  ERIN FONSECA    MRN:  [id number]    Aidin Request ID:  [id number]    Level of care reserved:  Inpatient Rehab Facility    Partner Reserved:  [facility] ([facility]), [place] [id number] [phone]    Clinical needs requested:    Geography searched:  10 miles around [address]    Start of Service:    Request sent:  9:21am EDT on 4/15/2025 by Meg Bundy    Partner reserved:  1:46pm EDT on 4/28/2025 by Zehra Ibarra    Choice list shared:  7:52am EDT on 4/28/2025 by Zehar Ibarra

## 2025-04-28 NOTE — ASSESSMENT & PLAN NOTE
"Patient with development of worsening confusion, recently seen at the [facility] ED 3/24/2025 with CT head at that time with finding of brain lesion for which MRI was advised to exclude cancerous etiology.     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  Lymphoma/leukemia panel nondiagnostic  CT head on 4/3 with interval increase in ventricular caliber concerning for worsening hydrocephalus  No indication for AED per neurosurgery and holding off steroids until Dx achieved  Continue neurochecks   Neurosurgery and oncology recommended repeat high-volume LP as previous was nondiagnostic  Post LP 4/7 which was again undiagnostic.   MRI of the brain from 4/11-\"Interval enlargement of the dominant left anterior basal ganglionic mass lesion with greater surrounding vasogenic edema and mass effect. 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 Differential considerations include lymphoma, multicentric high-grade glioma, and less likely metastasis\"  S/p brain bx 4/14 by neurosurgery   Nondiagnostic LPs  Patient pulled out EVD over the weekend  Discussed with hematology oncology on plan for Rituxan every week.  Will need to get the rehab facility does get accommodate for this in the outpatient setting.  Will need inpatient methotrexate every 4 weeks.   Last rituximab dose on 4/24/2025.  Last metotrexate 4/18.   Bed at [facility] " rehab is secured. Need to wean off of the one to one  Off restraints since 4/27  Will need to wean off of continous observation  Patient likely delirious from the Benadryl as well as the steroids that were given  Now discontinued  Delirium protocol  Will discontinue the Zyprexa during the day and continue the Zyprexa at night  Patient was walking today. Will place order for the patient to go outside for some fresh air as requested by family

## 2025-04-28 NOTE — ASSESSMENT & PLAN NOTE
In the setting of the above  On exam, he is confused but interactive. Oriented to name only, does not recall , disoriented to all else.  EVD dislodged due to  agitation while admitted to ICU but did not need to be replaced secondary to stable ventricle size  Off restraints, on 1:1.

## 2025-04-28 NOTE — ASSESSMENT & PLAN NOTE
See today for 2-week POV L frontal endoscopic biopsy of ventricular mass and replacement of EVD by Dr. Causey on 4/14/25  Subependymal lesions with large left basal ganglia suspicious for lymphoma.  P/w confusion, short term memory loss.  Patient had multiple nondiagnostic LPs and hematology/oncology was requesting a biopsy to confirm.  4/13 - patient had worsening mental status change and CT head demonstrated progressive ventriculomegaly and likely trapped left lateral ventricle, ultimately a left frontal EVD was placed.  4/14 - patient self extubated.  Preliminary pathology, small round blue cell tumor; differential Dx includes lymphoma   EVD replaced during surgery on 4/14  Patient pulled out EVD over weekend, but ICP's were low for > 24 hours after clamping    Imaging:  CTH 4/21: significant interval decrease in overall tumor burden with decrease in size of ventricular and left basal ganglia lesions, significant reduction in ventriculomegaly and transependymal edema, no longer any midline shift or trapped  ventricle, third ventricle and Foramen of Monro now clearly visible on coronal sequences    Plan:   Final pathology: primary CNS non-Hodgkin's lymphoma, diffuse large B-cell lymphoma   EVD sutures discontinued.  Oncology recommendations for treatment:   Plan is for treatment with the following regimen:   Rituximab 375 mg/m² intravenously yesterday  Initial infusion of high-dose methotrexate 8 g/m² intravenously over 4 hours today  Initiate intrathecal cytarabine with cycle 1 of high-dose methotrexate.  Continue intrathecal Daria-C every 2 weeks x 4  On decadron  MRI spinal axis negative  Keppra only for postoperative seizure ppx  SBP<160  DVT ppx: SCDs, SQH BID   PT/OT   Social work following for assistance with dispo once medically cleared      Neurosurgery will sign off at this time. No need for outpatient follow-up since path was already discussed (started treatment inpatient) and sutures absorbable.

## 2025-04-28 NOTE — ASSESSMENT & PLAN NOTE
"Palliative diagnosis: newly diagnosed large B cell lymphoma    Symptom management:  Olanzapine 10mg HS, AM dose d/c, additional PRN IM olanzapine available but not used since 1am on 4/26   Consider replacing HS olanzapine with HS seroquel if no improvement   Goal to wean patient from restraints to enable therapy/discharge planning  Decadron d/c by primary  oxyIR 2.5-5mg PO Q4H PRN moderate-severe pain  Dilaudid 0.2mg IV Q2H PRN BT pain  Acetaminophen 650mg Q6H PRN mild pain  Bowel regimen in place    Goals:  Level 1 code status  Disease focused care without limits placed  Patient initiated high-dose methotrexate + rituximab inpatient  Current focus on rehabilitation, hopeful for mid week discharge to [facility]    Decisional apparatus:  Patient does not have capacity on exam today.  If capacity is lost, patient's substitute decision maker would default to spouse and adult children by [place] 169.  ER contacts:  Elizabeth Chester  Daughter  [phone]  Guy Naldo \"Naldo\" Phillip  Spouse  [phone]    Patient also has a son, Drew, and another daughter, Marina, who has down syndrome. Elizabeth is the main contact but family makes decisions as unit.    Advance Directive/Living Will/POLST: none on file    Social support:  Patient support system: spouse Naldo, 3 adult children, extended family.  Spouse Naldo lives locally and daughter Elizabeth is from [place] but Naldo prefers that she is primary contact due to language barrier (Chinese)  4/22: Met with Naldo along with palliative SW (Shikha and Brynn) and  (Kaylee #[id number]).  Provided medical update, supportive listening, answered questions to her satisfaction  Naldo asks for oncology feedback on prognosis, this was relayed to their team  Supportive listening provided plan for visit outside later today  Normalized experience of patient/family  Provided anticipatory guidance  Advocated for patient/family with interdisciplinary care team    Coordination of care:  Reviewed case with RN, primary " team    Follow up  Palliative Care will continue to follow  Please reach out via [employer] secure chat if questions or concerns arise.    We appreciate the invitation to be involved in this patient's care.

## 2025-04-28 NOTE — CASE MANAGEMENT
Case Management Discharge Planning Note    Patient name Alexis Dennison  Location [facility] MRN [id number]  : 1960 Date 2025       Current Admission Date: 3/29/2025  Current Admission Diagnosis:Primary CNS lymphoma   Patient Active Problem List    Diagnosis Date Noted Date Diagnosed    Metabolic alkalosis 2025     LETY (acute kidney injury) (HCC) 2025     Goals of care, counseling/discussion 2025     Palliative care encounter 2025     Primary CNS lymphoma 2025     Encephalopathy 2025     Constipation 2025     Ambulatory dysfunction 2025     Thyroid nodule 2025     Pulmonary nodule 2025     Liver lesion 2025     Brain tumor (HCC) 2025     Type 2 diabetes mellitus with hyperglycemia, without long-term current use of insulin (HCC) 2022     HTN, goal below 130/80 2022     Mixed hyperlipidemia 2022     Vitamin D deficiency 2022     NAFLD (nonalcoholic fatty liver disease) 2022       LOS (days): 30  Geometric Mean LOS (GMLOS) (days): 11.1  Days to GMLOS:-18.5     OBJECTIVE:  Risk of Unplanned Readmission Score: 36.6         Current admission status: Inpatient   Preferred Pharmacy:   [facility]pharm[facility] #[id number] - [place] - [address] [address]  [address] [address]  Phone: [phone] Fax: [phone]    [facility] [facility] - [place], [place] - [address]  [address]  [facility] [address]  Phone: [phone] Fax: [phone]    Primary Care Provider: PTAI Mckeon    Primary Insurance: BLUE CROSS  Secondary Insurance: CAPITAL    DISCHARGE DETAILS:    Discharge planning discussed with:: Pt's daughter Elizabeth  Freedom of Choice: Yes  Comments - Freedom of Choice: [facility] ARC  CM contacted family/caregiver?: Yes  Were Treatment Team discharge recommendations reviewed with patient/caregiver?: Yes  Did  patient/caregiver verbalize understanding of patient care needs?: N/A- going to facility  Were patient/caregiver advised of the risks associated with not following Treatment Team discharge recommendations?: Yes    Contacts  Patient Contacts: daughter Elizabeth Chester  Relationship to Patient:: Family  Contact Method: Phone  Phone Number: [phone]  Reason/Outcome: Discharge Planning    Requested Home Health Care         Is the patient interested in HHC at discharge?: No    DME Referral Provided  Referral made for DME?: No    Other Referral/Resources/Interventions Provided:  Interventions: Acute Rehab  Referral Comments: CM discussed rehab choices, pt's family in agreement with [facility]         Treatment Team Recommendation: Acute Rehab  Discharge Destination Plan:: Acute Rehab

## 2025-04-28 NOTE — PLAN OF CARE
Problem: PAIN - ADULT  Goal: Verbalizes/displays adequate comfort level or baseline comfort level  Description: Interventions:- Encourage patient to monitor pain and request assistance- Assess pain using appropriate pain scale- Administer analgesics based on type and severity of pain and evaluate response- Implement non-pharmacological measures as appropriate and evaluate response- Notify physician/advanced practitioner if interventions unsuccessful or patient reports new pain  Outcome: Progressing     Problem: SAFETY ADULT  Goal: Patient will remain free of falls  Description: INTERVENTIONS:- Educate patient/family on patient safety including physical limitations- Instruct patient to call for assistance with activity - Consult OT/PT to assist with strengthening/mobility - Keep Call bell within reach- Keep bed low and locked with side rails adjusted as appropriate- Keep care items and personal belongings within reach- Initiate and maintain comfort rounds- Make Fall Risk Sign visible to staff- Offer Toileting every 2 Hours, in advance of need- Initiate/Maintain bed/chair alarm- Obtain necessary fall risk management equipment.- Apply yellow socks and bracelet for high fall risk patients- Consider moving patient to room near nurses station  INTERVENTIONS:- Educate patient/family on patient safety including physical limitations- Instruct patient to call for assistance with activity - Consult OT/PT to assist with strengthening/mobility - Keep Call bell within reach- Keep bed low and locked with side rails adjusted as appropriate- Keep care items and personal belongings within reach- Initiate and maintain comfort rounds- Make Fall Risk Sign visible to staff- Offer Toileting every 2 Hours, in advance of need- Initiate/Maintain bed alarm- Obtain necessary fall risk management equipment: bracelet, socks, alarms- Apply yellow socks and bracelet for high fall risk patients- Consider moving patient to room near nurses  station  Outcome: Progressing  Goal: Maintain or return to baseline ADL function  Description: INTERVENTIONS:-  Assess patient's ability to carry out ADLs; assess patient's baseline for ADL function and identify physical deficits which impact ability to perform ADLs (bathing, care of mouth/teeth, toileting, grooming, dressing, etc.)- Assess/evaluate cause of self-care deficits - Assess range of motion- Assess patient's mobility; develop plan if impaired- Assess patient's need for assistive devices and provide as appropriate- Encourage maximum independence but intervene and supervise when necessary- Involve family in performance of ADLs- Assess for home care needs following discharge - Consider OT consult to assist with ADL evaluation and planning for discharge- Provide patient education as appropriate  Outcome: Progressing  Goal: Maintains/Returns to pre admission functional level  Description: INTERVENTIONS:- Perform AM-PAC 6 Click Basic Mobility/ Daily Activity assessment daily.- Set and communicate daily mobility goal to care team and patient/family/caregiver. - Collaborate with rehabilitation services on mobility goals if consulted- Reposition patient every 2 hours.- Dangle patient 3 times a day- Stand patient 3 times a day- Ambulate patient 3 times a day- Out of bed to chair 3 times a day - Out of bed for meals 3 times a day- Out of bed for toileting- Record patient progress and toleration of activity level   Outcome: Progressing

## 2025-04-28 NOTE — PROGRESS NOTES
Progress Note - Neurosurgery   Name: Alexis Dennison 64 y.o. male I MRN: [id number]  Unit/Bed#: [facility] 904-01 I Date of Admission: 3/29/2025   Date of Service: 4/28/2025 I Hospital Day: 30    Assessment & Plan  Brain tumor (HCC)  See today for 2-week POV L frontal endoscopic biopsy of ventricular mass and replacement of EVD by Dr. Causey on 4/14/25  Subependymal lesions with large left basal ganglia suspicious for lymphoma.  P/w confusion, short term memory loss.  Patient had multiple nondiagnostic LPs and hematology/oncology was requesting a biopsy to confirm.  4/13 - patient had worsening mental status change and CT head demonstrated progressive ventriculomegaly and likely trapped left lateral ventricle, ultimately a left frontal EVD was placed.  4/14 - patient self extubated.  Preliminary pathology, small round blue cell tumor; differential Dx includes lymphoma   EVD replaced during surgery on 4/14  Patient pulled out EVD over weekend, but ICP's were low for > 24 hours after clamping    Imaging:  CTH 4/21: significant interval decrease in overall tumor burden with decrease in size of ventricular and left basal ganglia lesions, significant reduction in ventriculomegaly and transependymal edema, no longer any midline shift or trapped  ventricle, third ventricle and Foramen of Monro now clearly visible on coronal sequences    Plan:   Final pathology: primary CNS non-Hodgkin's lymphoma, diffuse large B-cell lymphoma   EVD sutures discontinued.  Oncology recommendations for treatment:   Plan is for treatment with the following regimen:   Rituximab 375 mg/m² intravenously yesterday  Initial infusion of high-dose methotrexate 8 g/m² intravenously over 4 hours today  Initiate intrathecal cytarabine with cycle 1 of high-dose methotrexate.  Continue intrathecal Daria-C every 2 weeks x 4  On decadron  MRI spinal axis negative  Keppra only for postoperative seizure ppx  SBP<160  DVT ppx: SCDs, SQH BID   PT/OT   Social work following  for assistance with dispo once medically cleared      Neurosurgery will sign off at this time. No need for outpatient follow-up since path was already discussed (started treatment inpatient) and sutures absorbable.  LETY (acute kidney injury) (HCC)    Metabolic alkalosis      I have discussed the above management plan in detail with the primary service.   Neurosurgery service signing off.  Please contact the SecureChat role for the Neurosurgery service with any questions/concerns.    Subjective   Sleeping.    Objective :  Temp:  [96.6 °F (35.9 °C)-97.6 °F (36.4 °C)] 96.6 °F (35.9 °C)  HR:  [53-66] 61  BP: ()/(57-71) 98/57  Resp:  [14-19] 14  SpO2:  [95 %-97 %] 97 %  O2 Device: None (Room air)    I/O         04/26 0701  04/27 0700 04/27 0701 04/28 0700 04/28 0701 04/29 0700    P.O. 420 240     I.V. (mL/kg) 1675 (21.5) 356.3 (4.6)     Total Intake(mL/kg) 2095 (26.9) 596.3 (7.6)     Urine (mL/kg/hr) 4525 (2.4) 2150 (1.1) 300 (1)    Stool 0      Total Output 4525 2150 300    Net -2430 -1553.8 -300           Unmeasured Urine Occurrence  2 x     Unmeasured Stool Occurrence 0 x            Physical Exam  Constitutional:       Appearance: He is ill-appearing.   HENT:      Head:      Comments: Biopsy incision and EVD site incisions clean and dry, healing well  Pulmonary:      Effort: Pulmonary effort is normal.   Neurological:      Cranial Nerves: Dysarthria present.      Motor: Motor strength is normal.     Neurological Exam  Mental Status  Drowsy. dysarthria present. Mixed aphasia present.  Very confused and sedated.    Motor   Strength is 5/5 throughout all four extremities.        Lab Results: I have reviewed the following results:  Recent Labs     04/27/25  0524 04/28/25  0721   WBC 8.72 7.63   HGB 12.0 12.6   HCT 33.9* 35.8*   PLT 48* 53*   SODIUM 142 141   K 3.8 3.8    105   CO2 30 30   BUN 42* 43*   CREATININE 1.60* 1.68*   GLUC 115 118   MG 1.8*  --    AST 23  --    ALT 83*  --    ALB 3.1*  --    TBILI  1.06*  --    ALKPHOS 46  --        Imaging Results Review: No pertinent imaging studies reviewed.  Other Study Results Review: No additional pertinent studies reviewed.    VTE Pharmacologic Prophylaxis: VTE covered by:  heparin (porcine), Subcutaneous, 5,000 Units at 04/28/25 0532    and Sequential compression device (Venodyne)

## 2025-04-28 NOTE — ASSESSMENT & PLAN NOTE
Lab Results   Component Value Date    CREATININE 1.68 (H) 04/28/2025    CREATININE 1.60 (H) 04/27/2025    CREATININE 1.55 (H) 04/26/2025       Lab Results   Component Value Date    EGFR 41 04/28/2025    EGFR 44 04/27/2025    EGFR 46 04/26/2025   Creatinine continues to uptrend.  Clear in the setting of hypotension episode and methotrexate toxicity  Continue IV fluids per nephrology

## 2025-04-28 NOTE — NURSING NOTE
Pt, ripped off ID bracelet and condom catheter, Pt was able to be redirected. Replaced ID bracelet and condom cath. Pt repositioned and resting in bed.

## 2025-04-28 NOTE — UTILIZATION REVIEW
Continued Stay Review    Date: 4/28/25                           Current Patient Class: Inpatient  Current Level of   Care: Med Surg     HPI:64 y.o. male initially admitted on  3/29/25     Current Diagnosis:CNS lymphoma     Assessment/Plan: Hospitalist:Biopsy incision and EVD site incisions clean and dry, healing . Dysarthria presetn, drwosy on exam, mixed aphasia, confused and sedated. BUN 43, creat 1.68.  Final pathology: primary CNS non-Hodgkin's lymphoma, diffuse large B-cell lymphoma .EVD sutures discontinued.Oncology recommendations for treatment: DC waist restraint on 4/27.  Plan is for treatment with the following regimen:   Rituximab 375 mg/m² intravenously yesterday  Initial infusion of high-dose methotrexate 8 g/m² intravenously over 4 hours today  Initiate intrathecal cytarabine with cycle 1 of high-dose methotrexate.  Continue intrathecal Daria-C every 2 weeks x 4  On decadron. MRI spinal axis negative. Keppra only for postoperative seizure ppx. SBP<160. DVT ppx: SCDs, SQH BID   PT/OT .Social work following for assistance with dispo once medically cleared  Pt/family would like [facility] for inpatient rehab pending insurance authorization once pt is medically stable for dc .          Medications:   Scheduled Medications:  allopurinol, 300 mg, Oral, Daily  atorvastatin, 20 mg, Oral, Daily  bisacodyl, 10 mg, Rectal, Daily  chlorhexidine, 15 mL, Mouth/Throat, Q12H CAIT  heparin (porcine), 5,000 Units, Subcutaneous, Q8H CAIT  insulin glargine, 10 Units, Subcutaneous, HS  insulin lispro, 5-25 Units, Subcutaneous, TID AC  insulin lispro, 5-25 Units, Subcutaneous, HS  levETIRAcetam, 500 mg, Oral, Q12H CAIT  melatonin, 6 mg, Oral, HS  OLANZapine, 10 mg, Oral, HS  pantoprazole, 40 mg, Oral, Early Morning  polyethylene glycol, 17 g, Oral, Daily  senna, 2 tablet, Oral, HS      Continuous IV Infusions: none      PRN Meds:  acetaminophen, 650 mg, Oral, Q6H PRN  alteplase, 2 mg, Intracatheter, Q1MIN PRN  alteplase, 2 mg,  Intracatheter, Q1MIN PRN  aluminum-magnesium hydroxide-simethicone, 30 mL, Oral, Q4H PRN  calcium carbonate, 1,000 mg, Oral, Daily PRN  HYDROmorphone, 0.2 mg, Intravenous, Q2H PRN  OLANZapine, 5 mg, Intramuscular, Q8H PRN  ondansetron, 4 mg, Intravenous, Q6H PRN  oxyCODONE, 2.5 mg, Oral, Q4H PRN   Or  oxyCODONE, 5 mg, Oral, Q4H PRN  sodium chloride, 20 mL/hr, Intravenous, Once PRN  sodium chloride, 20 mL/hr, Intravenous, Once PRN  sodium chloride, 20 mL/hr, Intravenous, Once PRN      Discharge Plan: tbd     Vital Signs (last 3 days)       Date/Time Temp Pulse Resp BP MAP (mmHg) SpO2 O2 Device Patient Position - Orthostatic VS Yeni Coma Scale Score Pain    04/28/25 0939 -- -- -- -- -- -- None (Room air) -- 14 No Pain    04/28/25 06:49:44 96.6 °F (35.9 °C) 61 14 98/57 71 97 % -- -- -- --    04/27/25 2201 -- -- -- -- -- -- -- -- 14 No Pain    04/27/25 21:42:49 97.5 °F (36.4 °C) 53 -- 137/71 93 95 % -- -- -- --    04/27/25 21:39:32 -- 56 19 137/71 93 97 % -- -- -- --    04/27/25 14:55:53 97.6 °F (36.4 °C) 66 16 126/70 89 96 % -- Lying -- --    04/27/25 0941 -- -- -- -- -- -- None (Room air) -- 14 No Pain    04/27/25 07:10:58 96.9 °F (36.1 °C) 60 18 121/73 89 97 % None (Room air) Lying -- --    04/27/25 0315 -- -- -- -- -- -- -- -- 14 --    04/27/25 0300 -- 51 -- -- -- 95 % -- -- -- --    04/26/25 2315 -- -- -- -- -- 97 % None (Room air) -- 14 No Pain    04/26/25 2300 -- 64 -- -- -- 95 % -- -- -- --    04/26/25 1930 -- -- -- -- -- 95 % None (Room air) -- 14 No Pain    04/26/25 15:36:44 -- 55 18 115/75 88 95 % -- -- -- --    04/26/25 15:34:24 97.9 °F (36.6 °C) 54 -- -- -- 96 % -- -- -- --    04/26/25 0819 -- -- -- -- -- -- None (Room air) -- 15 --    04/26/25 07:11:40 97.9 °F (36.6 °C) 53 18 135/69 91 96 % -- -- -- --    04/26/25 0000 -- -- -- -- -- -- -- -- 15 --    04/25/25 22:39:15 97.9 °F (36.6 °C) 46 17 138/75 96 96 % -- -- -- --    04/25/25 20:20:54 98.5 °F (36.9 °C) 55 17 147/75 99 95 % -- -- -- --    04/25/25  2000 -- -- -- -- -- -- -- -- 15 --    04/25/25 14:56:52 97.7 °F (36.5 °C) 51 17 148/72 97 94 % -- -- -- --    04/25/25 0800 -- -- -- -- -- -- None (Room air) -- 14 --    04/25/25 07:37:36 97.6 °F (36.4 °C) 44 -- 148/75 99 96 % -- -- -- --          Weight (last 2 days)       None            Pertinent Labs/Diagnostic Results:   Radiology:  RADIOLOGY RESULTS   Final Interpretation by  (04/23 1108)      CT head wo contrast   Final Interpretation by Satnam Riojas MD (04/21 0946)      1. Increasing ventricular caliber without evidence of an acute, decompensated hydrocephalus. Recommend continued close follow-up.   2. Improving intraventricular blood products.      The study was marked in EPIC for immediate notification.                  Workstation performed: [id number]         CT head wo contrast   Final Interpretation by Michael Campbell MD (04/20 8151)      1.  Interval removal of left frontal approach ventriculostomy catheter, noting stable size and configuration of the ventricular system, which contains intraventricular blood products. Blood products also noted along the site of the prior catheter tract.   2.  Refer to concurrent brain MRI for characterization of hypoattenuation corresponding to T2/FLAIR signal abnormality involving the left gangliocapsular region extending into the left frontal lobe inferiorly.      The study was marked in EPIC for immediate notification.            Workstation performed: [id number]         MRI Brain BT w wo Contrast   Final Interpretation by Michael Campbell MD (04/20 0939)   Within the confines of a motion-degraded examination:      1.  Decreased enhancement and perfusion metrics associated with the left gangliocapsular mass (biopsy-proven lymphoma), noting decreased mass effect on the lateral ventricles and decreased transependymal flow of CSF. The change from MRI dated 4/11/2025    could be related to medical therapy. See narrative above for full discussion.   2.   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   3.  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 This could be further evaluated on nonemergent MRI pituitary protocol.      The study was marked in EPIC for immediate notification.      Workstation performed: [id number]         MRI thoracic spine w wo contrast   Final Interpretation by Michael Campbell MD (04/20 0957)      1.  No evidence of lymphomatous involvement of the spine.   2.  Multilevel spondylotic changes, as detailed above. See narrative above for full details.   3.  Multiple large left thyroid nodules, for which further evaluation with thyroid sonogram is recommended if not previously performed.      The study was marked in EPIC for immediate notification.         Workstation performed: [id number]         MRI lumbar spine w wo contrast   Final Interpretation by Michael Campbell MD (04/20 0957)      1.  No evidence of lymphomatous involvement of the spine.   2.  Multilevel spondylotic changes, as detailed above. See narrative above for full details.   3.  Multiple large left thyroid nodules, for which further evaluation with thyroid sonogram is recommended if not previously performed.      The study was marked in EPIC for immediate notification.         Workstation performed: [id number]         MRI cervical spine w wo contrast   Final Interpretation by Michael Campbell MD (04/20 0957)      1.  No evidence of lymphomatous involvement of the spine.   2.  Multilevel spondylotic changes, as detailed above. See narrative above for full details.   3.  Multiple large left thyroid nodules, for which further evaluation with thyroid sonogram is recommended if not previously performed.      The study was marked in EPIC for immediate notification.         Workstation performed: [id number]         XR chest portable ICU   Final Interpretation by Yamel  Urszula Patton MD (04/21 0946)      No acute cardiopulmonary disease.      ET tube 3 cm above the arnav.            Workstation performed: [id number]         MRI follow up neuro   Final Interpretation by Michael Campbell MD (04/18 1212)      Nondiagnostic examination. Recommend repeat examination, as clinically appropriate/tolerable.            Workstation performed: [id number]         CT head wo contrast   Final Interpretation by Tressa Arshad MD (04/14 2030)      Left frontal approach ventriculostomy catheter terminating near the foramen of Monro with increased left frontal lobe pneumocephalus and gas within the left frontal horn since prior study. Slightly increased small amount of layering intraventricular    hemorrhage within the left occipital horn. Improved hydrocephalus primarily of the left lateral ventricle since prior study.      Stable hyperdense mass centered within the left basal ganglia with surrounding vasogenic edema and mild rightward midline shift.      The study was marked in EPIC for immediate notification.         Workstation performed: [id number]         XR chest portable ICU   Final Interpretation by Fareed Roman MD (04/14 0932)      No acute cardiopulmonary disease. Appropriately positioned endotracheal tube.            Workstation performed: [id number]         CT head wo contrast   Final Interpretation by Len Justice MD (04/13 1722)      Status post placement of a left frontal approach ventriculostomy catheter with the tip terminating adjacent to the previously seen hyperdense mass near the foramen of De Oliveira. Small amount of intraventricular hemorrhage is noted within the left occipital    horn with overall stable degree of hydrocephalus.      Grossly stable hyperdense mass centered within the left basal ganglia. Correlate with prior contrast-enhanced MRI for additional findings.                  Workstation performed: [id number]         CT head wo contrast   Final Interpretation  by Daniel Gan MD (04/13 1326)      Predominantly left sided obstructive hydrocephalus from lesion located adjacent to the foramen of Monro is not significantly changed from 4/3/2025.      Blood products within the occipital horn of the left lateral ventricle is similar from MRI of the brain 4/11/2025.      Left anterior basal ganglia mass with surrounding edema, regional mass effect, and effacement of the left suprasellar cistern, appears similar to MRI of the brain 4/11/2025. Please see that report for further characterization of total extent of tumor.                  Resident: CINTHYA FAJARDO I, the attending radiologist, have reviewed the images and agree with the final report above.      Workstation performed: [id number]         MRI Brain BT w wo Contrast   Final Interpretation by Pallav N Shah, MD (04/11 2032)   Addendum (preliminary) 1 of 1 by Pallav N Shah, MD (04/11 2032)   ADDENDUM:      I also verbally communicated these findings to the covering hospitalist,    Dr. Laurence Restrepo at 7:20 p.m.      Final   Interval enlargement of the dominant left anterior basal ganglionic mass lesion with greater surrounding vasogenic edema and mass effect. 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 Differential considerations include lymphoma, multicentric high-grade glioma, and less likely metastasis. Correlation with CSF cytology is recommended.      Small amount of intraventricular hemorrhage.      Interval progression of the previously noted neoplastic disease with the dominant mass in the left anterior basal ganglia and numerous growing ependymal and intraventricular nodular lesions.      The study was marked in EPIC for immediate notification.         Workstation performed: [id number]         FL lumbar puncture diagnostic   Final Interpretation by Grant Galloway DO (04/07 1257)      Successful  fluoroscopically guided lumbar puncture.         Workstation performed: [id number]         CT head wo contrast   Final Interpretation by Miranda Burr MD (04/03 1124)      Redemonstrated hyperattenuating subependymal masses better characterized on recent MR. Interval increased ventricular caliber, left greater than right in comparison to recent CT dated 3/29/2025 concerning for worsening hydrocephalus. Recommend    neurosurgical consultation.      The study was marked in EPIC for immediate notification.         Workstation performed: [id number]         FL IN-patient lumbar puncture   Final Interpretation by Elpidio Martinez MD (03/31 1448)      Successful fluoroscopically guided lumbar puncture.      Opening pressure: 15 cm of H20               Workstation performed: [id number]         MRI abdomen w wo contrast   Final Interpretation by Oswaldo Sewell MD (03/31 1021)      1.  No suspicious hepatic lesions. There is a simple right hepatic lobe cyst.   2.  Mild diffuse hepatic steatosis.         Workstation performed: [id number]         CT head wo contrast   Final Interpretation by Lonnie Arellano MD (03/29 2224)      Stable subependymal nodules and left foramen of De Oliveira region hyperdense lesion again seen with associated mild symmetrical dilatation of the left lateral ventricle and minimal left to right midline shift. The overall findings are unchanged compared to    the prior study. No acute intracranial hemorrhage or evidence of acute cerebral infarction.                  Workstation performed: [id number]         CTA chest abdomen pelvis w wo contrast   Final Interpretation by Oswaldo Sewell MD (03/29 2126)      1.  There is a nonspecific 12 mm hypodense lesion within the right hepatic lobe. While this may represent a hemangioma, a metastatic lesion can not be excluded. This is suboptimally evaluated on the current exam, due to arterial timing of the contrast    bolus and background diffuse hepatic  steatosis. Further evaluation by gadolinium enhanced MRI of the abdomen is recommended.   2.  Nonspecific 4 mm right lower lobe pulmonary nodule. In the setting of a known malignancy, a 3-month follow-up chest CT could be performed for further evaluation.   3.  Heterogeneous nodular enlargement of the left thyroid lobe. A nonemergent thyroid ultrasound follow-up is recommended.      4.  Please refer to the report body for description of other incidental, chronic and/or benign findings.      The study was marked in EPIC for significant notification.         Workstation performed: [id number]           Cardiology:  ECG 12 lead   Final Result by Tristian Burroughs MD (04/21 1715)   Marked sinus bradycardia   Abnormal ECG   When compared with ECG of 29-Mar-2025 20:38,   Borderline criteria for Inferior infarct are no longer Present   Confirmed by Tristian Burroughs ([id number]) on 4/21/2025 5:15:56 PM      ECG 12 lead   Final Result by Anthony Anderson MD (03/29 2100)   Age and gender specific ECG analysis    Normal sinus rhythm   Possible Inferior infarct (cited on or before 24-Mar-2025)   Abnormal ECG   Confirmed by Anthony Anderson ([id number]) on 3/29/2025 9:00:53 PM        GI:  No orders to display           Results from last 7 days   Lab Units 04/28/25  0721 04/27/25  0524 04/26/25  0551 04/25/25  0501 04/24/25  0515   WBC Thousand/uL 7.63 8.72 9.58 5.99 6.33   HEMOGLOBIN g/dL 12.6 12.0 12.1 11.6* 11.6*   HEMATOCRIT % 35.8* 33.9* 35.2* 33.5* 34.8*   PLATELETS Thousands/uL 53* 48* 61* 81* 110*   TOTAL NEUT ABS Thousands/µL  --  6.75 7.73* 5.19 5.64         Results from last 7 days   Lab Units 04/28/25  0721 04/27/25  0524 04/26/25  0551 04/25/25  0501 04/24/25  0515 04/23/25  0625 04/22/25  0515   SODIUM mmol/L 141 142 144 144 145 144 142   POTASSIUM mmol/L 3.8 3.8 3.9 4.1 4.3 4.4 4.2   CHLORIDE mmol/L 105 106 107 104 106 103 96   CO2 mmol/L 30 30 29 33* 31 34* 35*   ANION GAP mmol/L 6 6 8 7 8 7 11   BUN mg/dL 43* 42* 45* 51* 69* 63*  "55*   CREATININE mg/dL 1.68* 1.60* 1.55* 1.77* 2.10* 2.63* 2.77*   EGFR ml/min/1.73sq m 41 44 46 39 32 24 22   CALCIUM mg/dL 9.5 9.5 9.4 9.1 9.2 8.9 9.1   MAGNESIUM mg/dL  --  1.8*  --   --   --   --  2.7   PHOSPHORUS mg/dL  --   --   --  4.4* 5.1* 5.3* 5.5*     Results from last 7 days   Lab Units 04/27/25  0524 04/26/25  0551 04/25/25  0501 04/24/25  0515 04/23/25  0625   AST U/L 23 39 38 26 27   ALT U/L 83* 101* 89* 72* 77*   ALK PHOS U/L 46 41 41 38 41   TOTAL PROTEIN g/dL 5.2* 5.2* 5.5* 5.1* 5.5*   ALBUMIN g/dL 3.1* 3.3* 3.2* 3.3* 3.3*   TOTAL BILIRUBIN mg/dL 1.06* 1.08* 0.85 0.93 1.07*     Results from last 7 days   Lab Units 04/28/25  1111 04/28/25  0633 04/27/25  2207 04/27/25  1940 04/27/25  1548 04/27/25  1121 04/27/25  0738 04/26/25  2042 04/26/25  1546 04/26/25  1126 04/26/25  0739 04/25/25  2339   POC GLUCOSE mg/dl 188* 120 142* 158* 229* 194* 123 75 239* 149* 70 166*     Results from last 7 days   Lab Units 04/28/25  0721 04/27/25  0524 04/26/25  0551 04/25/25  0501 04/24/25  0515 04/23/25  0625 04/22/25  0515   GLUCOSE RANDOM mg/dL 118 115 83 166* 151* 139 153*             No results found for: \"BETA-HYDROXYBUTYRATE\"                               Results from last 7 days   Lab Units 04/21/25  1728   TSH 3RD GENERATON uIU/mL 0.019*                                                         Results from last 7 days   Lab Units 04/25/25  0503 04/24/25  2343 04/24/25  1810 04/24/25  1321   CLARITY UA  Turbid Slightly Cloudy Turbid  --    COLOR UA  Light Yellow Colorless Light Yellow  --    SPEC GRAV UA  1.012 1.015 1.013  --    PH UA  7.5 7.5 7.5  --    GLUCOSE UA mg/dl 200 (1/5%)* 150 (3/20%)* Negative  --    KETONES UA mg/dl Negative Negative Negative  --    BLOOD UA  Small* Small* Small*  --    PROTEIN UA mg/dl Negative Trace* Negative  --    NITRITE UA  Negative Negative Negative  --    BILIRUBIN UA  Negative Negative Negative  --    UROBILINOGEN UA (BE) mg/dl <2.0 <2.0 <2.0  --    LEUKOCYTES UA  Small* " Negative Negative  --    WBC UA /hpf 2-4* 2-4* None Seen  --    RBC UA /hpf 4-10* 4-10* 10-20*  --    BACTERIA UA /hpf Occasional Occasional Innumerable*  --    EPITHELIAL CELLS WET PREP /hpf None Seen None Seen Occasional  --    MUCUS THREADS   --   --  Occasional*  --    CREATININE UR mg/dL  --   --   --  42.1                                                   Network Utilization Review Department  ATTENTION: Please call with any questions or concerns to [phone] and carefully listen to the prompts so that you are directed to the right person. All voicemails are confidential.   For Discharge needs, contact Care Management DC Support Team at [phone] opt. 2  Send all requests for admission clinical reviews, approved or denied determinations and any other requests to dedicated fax number below belonging to the [facility] where the patient is receiving treatment. List of dedicated fax numbers for the Facilities:  FACILITY NAME UR FAX NUMBER   ADMISSION DENIALS (Administrative/Medical Necessity) [phone]   DISCHARGE SUPPORT TEAM (NETWORK) [phone]   PARENT CHILD HEALTH (Maternity/NICU/Pediatrics) [phone]   [facility] [phone]   [facility] [phone]   [facility] [phone]   [facility] [phone]   [facility] [phone]   [facility] [phone]   [facility] [phone]   [facility] [phone]   [facility] [phone]   [facility] [phone]   [facility] [phone]   [facility] [phone]

## 2025-04-28 NOTE — PROGRESS NOTES
"Discussed with progress Note - Hospitalist   Name: Alexis Dennison 64 y.o. male I MRN: [id number]  Unit/Bed#: [facility] 904-01 I Date of Admission: 3/29/2025   Date of Service: 4/28/2025 I Hospital Day: 30     Assessment & Plan  Brain tumor (HCC)  Patient with development of worsening confusion, recently seen at the [facility] ED 3/24/2025 with CT head at that time with finding of brain lesion for which MRI was advised to exclude cancerous etiology.     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  Lymphoma/leukemia panel nondiagnostic  CT head on 4/3 with interval increase in ventricular caliber concerning for worsening hydrocephalus  No indication for AED per neurosurgery and holding off steroids until Dx achieved  Continue neurochecks   Neurosurgery and oncology recommended repeat high-volume LP as previous was nondiagnostic  Post LP 4/7 which was again undiagnostic.   MRI of the brain from 4/11-\"Interval enlargement of the dominant left anterior basal ganglionic mass lesion with greater surrounding vasogenic edema and mass effect. 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 Differential considerations include lymphoma, multicentric high-grade glioma, and less likely metastasis\"  S/p brain bx 4/14 by neurosurgery   Nondiagnostic LPs  Patient pulled out EVD over the weekend  Discussed with hematology oncology on plan for " Rituxan every week.  Will need to get the rehab facility does get accommodate for this in the outpatient setting.  Will need inpatient methotrexate every 4 weeks.   Last rituximab dose on 4/24/2025.  Last metotrexate 4/18.   Bed at [facility] rehab is secured. Need to wean off of the one to one  Off restraints since 4/27  Will need to wean off of continous observation  Patient likely delirious from the Benadryl as well as the steroids that were given  Now discontinued  Delirium protocol  Will discontinue the Zyprexa during the day and continue the Zyprexa at night  Patient was walking today. Will place order for the patient to go outside for some fresh air as requested by family    LETY (acute kidney injury) ([facility])  Lab Results   Component Value Date    CREATININE 1.68 (H) 04/28/2025    CREATININE 1.60 (H) 04/27/2025    CREATININE 1.55 (H) 04/26/2025       Lab Results   Component Value Date    EGFR 41 04/28/2025    EGFR 44 04/27/2025    EGFR 46 04/26/2025   Creatinine continues to uptrend.  Clear in the setting of hypotension episode and methotrexate toxicity  Continue IV fluids per nephrology    Encephalopathy  Worsening, patient is more lethargic today  Due to brain mass as above  Patient with acute onset confusion, forgetting names/places, oriented to self only most of the times  Delirium precautions  Patient has improving encephalopathy according to the daughter.    Type 2 diabetes mellitus with hyperglycemia, without long-term current use of insulin ([facility])  Has been well-controlled as outpatient with hemoglobin A1c of 6.6%  Hold home oral medications, start correctional insulin coverage   Accu-Cheks reviewed and acceptable  Continue hypoglycemia protocol and carb controlled diet  Mixed hyperlipidemia  Continue statin  HTN, goal below 130/80  Continue losartan and hydrochlorothiazide  BP reviewed and acceptable  Liver lesion  MRI obtained: No suspicious hepatic lesions.  There is a simple right hepatic lobe cyst.  Mild  diffuse hepatic steatosis.  LFTs stable  Outpatient follow up advised  Thyroid nodule  Incidental findings on CT scan  Nonemergent thyroid ultrasound for follow-up in the outpatient setting  Pulmonary nodule  Imaging with 4 mm right lower lobe pulmonary nodule.  CT chest 3-month follow-up  Constipation  Continue senna, MiraLAX and suppository  Ambulate patient as able  Ambulatory dysfunction  PT/OT recommending level 2 at discharge  Awaiting medically stability  Ambulate patient as able  Fall precautions  Goals of care, counseling/discussion    Palliative care encounter    Primary CNS lymphoma  Plan as above  Metabolic alkalosis      VTE Pharmacologic Prophylaxis: VTE Score: 5 High Risk (Score >/= 5) - Pharmacological DVT Prophylaxis Ordered: heparin. Sequential Compression Devices Ordered.    Mobility:   Basic Mobility Inpatient Raw Score: 13  JH-HLM Goal: 4: Move to chair/commode  JH-HLM Achieved: 7: Walk 25 feet or more  JH-HLM Goal NOT achieved. Continue with multidisciplinary rounding and encourage appropriate mobility to improve upon JH-HLM goals.    Patient Centered Rounds: I performed bedside rounds with nursing staff today.   Discussions with Specialists or Other Care Team Provider: Palliative, nephrology    Education and Discussions with Family / Patient: Updated  (daughter) via phone.    Current Length of Stay: 30 day(s)  Current Patient Status: Inpatient   Certification Statement: The patient will continue to require additional inpatient hospital stay due to placement.  Discharge Plan: Anticipate discharge in 24-48 hrs to rehab facility.    Code Status: Level 1 - Full Code    Subjective   Patient is currently napping comfortable. No complaints    Objective :  Temp:  [96.6 °F (35.9 °C)-97.6 °F (36.4 °C)] 96.6 °F (35.9 °C)  HR:  [53-66] 61  BP: ()/(57-71) 98/57  Resp:  [14-19] 14  SpO2:  [95 %-97 %] 97 %  O2 Device: None (Room air)    Body mass index is 24.67 kg/m².     Input and Output  Summary (last 24 hours):     Intake/Output Summary (Last 24 hours) at 4/28/2025 1413  Last data filed at 4/28/2025 1300  Gross per 24 hour   Intake 120 ml   Output 2350 ml   Net -2230 ml       Physical Exam  Vitals and nursing note reviewed.   Constitutional:       Appearance: He is well-developed and normal weight.   HENT:      Head: Normocephalic and atraumatic.      Nose: Nose normal.      Mouth/Throat:      Mouth: Mucous membranes are moist.   Eyes:      Conjunctiva/sclera: Conjunctivae normal.   Cardiovascular:      Rate and Rhythm: Normal rate and regular rhythm.      Heart sounds: Normal heart sounds. No murmur heard.  Pulmonary:      Effort: Pulmonary effort is normal. No respiratory distress.      Breath sounds: Normal breath sounds.   Abdominal:      General: Abdomen is flat.      Palpations: Abdomen is soft.      Tenderness: There is no abdominal tenderness.   Musculoskeletal:         General: No swelling.      Cervical back: Neck supple.      Right lower leg: No edema.      Left lower leg: No edema.   Skin:     General: Skin is warm and dry.      Capillary Refill: Capillary refill takes less than 2 seconds.   Neurological:      General: No focal deficit present.      Mental Status: He is alert and oriented to person, place, and time. Mental status is at baseline.   Psychiatric:         Mood and Affect: Mood normal.           Lines/Drains:  Lines/Drains/Airways       Active Status       Name Placement date Placement time Site Days    PICC Line 04/16/25 Right Basilic 04/16/25  1526  Basilic  11    External Urinary Catheter Medium 04/24/25  1000  -- 4                    Central Line:  Goal for removal: Will discontinue when hemodynamically stable.               Lab Results: I have reviewed the following results:   Results from last 7 days   Lab Units 04/28/25  0721 04/27/25  0524   WBC Thousand/uL 7.63 8.72   HEMOGLOBIN g/dL 12.6 12.0   HEMATOCRIT % 35.8* 33.9*   PLATELETS Thousands/uL 53* 48*   SEGS PCT %   --  77*   LYMPHO PCT %  --  17   MONO PCT %  --  3*   EOS PCT %  --  2     Results from last 7 days   Lab Units 04/28/25  0721 04/27/25  0524   SODIUM mmol/L 141 142   POTASSIUM mmol/L 3.8 3.8   CHLORIDE mmol/L 105 106   CO2 mmol/L 30 30   BUN mg/dL 43* 42*   CREATININE mg/dL 1.68* 1.60*   ANION GAP mmol/L 6 6   CALCIUM mg/dL 9.5 9.5   ALBUMIN g/dL  --  3.1*   TOTAL BILIRUBIN mg/dL  --  1.06*   ALK PHOS U/L  --  46   ALT U/L  --  83*   AST U/L  --  23   GLUCOSE RANDOM mg/dL 118 115         Results from last 7 days   Lab Units 04/28/25  1111 04/28/25  0633 04/27/25  2207 04/27/25  1940 04/27/25  1548 04/27/25  1121 04/27/25  0738 04/26/25  2042 04/26/25  1546 04/26/25  1126 04/26/25  0739 04/25/25  2339   POC GLUCOSE mg/dl 188* 120 142* 158* 229* 194* 123 75 239* 149* 70 166*               Recent Cultures (last 7 days):           Last 24 Hours Medication List:     Current Facility-Administered Medications:     acetaminophen (TYLENOL) tablet 650 mg, Q6H PRN    allopurinol (ZYLOPRIM) tablet 300 mg, Daily    alteplase (CATHFLO) injection 2 mg, Q1MIN PRN    alteplase (CATHFLO) injection 2 mg, Q1MIN PRN    aluminum-magnesium hydroxide-simethicone (MAALOX) oral suspension 30 mL, Q4H PRN    atorvastatin (LIPITOR) tablet 20 mg, Daily    bisacodyl (DULCOLAX) rectal suppository 10 mg, Daily    calcium carbonate (TUMS) chewable tablet 1,000 mg, Daily PRN    chlorhexidine (PERIDEX) 0.12 % oral rinse 15 mL, Q12H CAIT    heparin (porcine) subcutaneous injection 5,000 Units, Q8H CAIT    HYDROmorphone HCl (DILAUDID) injection 0.2 mg, Q2H PRN    insulin glargine (LANTUS) subcutaneous injection 10 Units 0.1 mL, HS    insulin lispro (HumALOG/ADMELOG) 100 units/mL subcutaneous injection 5-25 Units, TID AC **AND** Fingerstick Glucose (POCT), TID AC    insulin lispro (HumALOG/ADMELOG) 100 units/mL subcutaneous injection 5-25 Units, HS    levETIRAcetam (KEPPRA) tablet 500 mg, Q12H CAIT    melatonin tablet 6 mg, HS    OLANZapine (ZyPREXA) IM  injection 5 mg, Q8H PRN    OLANZapine (ZyPREXA) tablet 10 mg, HS    ondansetron (ZOFRAN) injection 4 mg, Q6H PRN    oxyCODONE (ROXICODONE) split tablet 2.5 mg, Q4H PRN **OR** oxyCODONE (ROXICODONE) IR tablet 5 mg, Q4H PRN    pantoprazole (PROTONIX) EC tablet 40 mg, Early Morning    polyethylene glycol (MIRALAX) packet 17 g, Daily    senna (SENOKOT) tablet 17.2 mg, HS    sodium chloride 0.9 % infusion, Once PRN, Last Rate: Stopped (04/17/25 2051)    sodium chloride 0.9 % infusion, Once PRN    sodium chloride 0.9 % infusion, Once PRN    Administrative Statements   Today, Patient Was Seen By: Rustam Drummond MD  I have spent a total time of 40 minutes in caring for this patient on the day of the visit/encounter including Diagnostic results, Prognosis, Risks and benefits of tx options, Instructions for management, Patient and family education, Importance of tx compliance, Risk factor reductions, Impressions, Counseling / Coordination of care, Documenting in the medical record, Reviewing/placing orders in the medical record (including tests, medications, and/or procedures), Obtaining or reviewing history  , and Communicating with other healthcare professionals .    **Please Note: This note may have been constructed using a voice recognition system.**

## 2025-04-29 ENCOUNTER — APPOINTMENT (INPATIENT)
Dept: RADIOLOGY | Facility: HOSPITAL | Age: 65
DRG: 820 | End: 2025-04-29
Payer: COMMERCIAL

## 2025-04-29 PROBLEM — R78.81 GRAM-NEGATIVE BACTEREMIA: Status: ACTIVE | Noted: 2025-04-29

## 2025-04-29 PROBLEM — A41.9 SEPSIS (HCC): Status: ACTIVE | Noted: 2025-04-29

## 2025-04-29 PROBLEM — C83.390 PRIMARY CENTRAL NERVOUS SYSTEM (CNS) LYMPHOMA: Status: ACTIVE | Noted: 2025-03-29

## 2025-04-29 LAB
ALBUMIN SERPL BCG-MCNC: 3.5 G/DL (ref 3.5–5)
ALP SERPL-CCNC: 64 U/L (ref 34–104)
ALT SERPL W P-5'-P-CCNC: 62 U/L (ref 7–52)
ANION GAP SERPL CALCULATED.3IONS-SCNC: 12 MMOL/L (ref 4–13)
AST SERPL W P-5'-P-CCNC: 15 U/L (ref 13–39)
BACTERIA UR QL AUTO: ABNORMAL /HPF
BASOPHILS # BLD AUTO: 0 THOUSANDS/ÂΜL (ref 0–0.1)
BASOPHILS NFR BLD AUTO: 0 % (ref 0–1)
BILIRUB SERPL-MCNC: 1.49 MG/DL (ref 0.2–1)
BILIRUB UR QL STRIP: NEGATIVE
BUN SERPL-MCNC: 46 MG/DL (ref 5–25)
CALCIUM SERPL-MCNC: 9.6 MG/DL (ref 8.4–10.2)
CHLORIDE SERPL-SCNC: 103 MMOL/L (ref 96–108)
CLARITY UR: ABNORMAL
CO2 SERPL-SCNC: 28 MMOL/L (ref 21–32)
COLOR UR: YELLOW
CREAT SERPL-MCNC: 2.04 MG/DL (ref 0.6–1.3)
EOSINOPHIL # BLD AUTO: 0.05 THOUSAND/ÂΜL (ref 0–0.61)
EOSINOPHIL NFR BLD AUTO: 1 % (ref 0–6)
ERYTHROCYTE [DISTWIDTH] IN BLOOD BY AUTOMATED COUNT: 11.6 % (ref 11.6–15.1)
GFR SERPL CREATININE-BSD FRML MDRD: 33 ML/MIN/1.73SQ M
GLUCOSE SERPL-MCNC: 143 MG/DL (ref 65–140)
GLUCOSE SERPL-MCNC: 144 MG/DL (ref 65–140)
GLUCOSE SERPL-MCNC: 150 MG/DL (ref 65–140)
GLUCOSE SERPL-MCNC: 161 MG/DL (ref 65–140)
GLUCOSE SERPL-MCNC: 166 MG/DL (ref 65–140)
GLUCOSE SERPL-MCNC: 194 MG/DL (ref 65–140)
GLUCOSE SERPL-MCNC: 257 MG/DL (ref 65–140)
GLUCOSE UR STRIP-MCNC: ABNORMAL MG/DL
HCT VFR BLD AUTO: 38 % (ref 36.5–49.3)
HGB BLD-MCNC: 13.3 G/DL (ref 12–17)
HGB UR QL STRIP.AUTO: ABNORMAL
IMM GRANULOCYTES # BLD AUTO: 0.01 THOUSAND/UL (ref 0–0.2)
IMM GRANULOCYTES NFR BLD AUTO: 0 % (ref 0–2)
KETONES UR STRIP-MCNC: NEGATIVE MG/DL
LACTATE SERPL-SCNC: 1.6 MMOL/L (ref 0.5–2)
LEUKOCYTE ESTERASE UR QL STRIP: ABNORMAL
LYMPHOCYTES # BLD AUTO: 0.48 THOUSANDS/ÂΜL (ref 0.6–4.47)
LYMPHOCYTES NFR BLD AUTO: 11 % (ref 14–44)
MCH RBC QN AUTO: 30.4 PG (ref 26.8–34.3)
MCHC RBC AUTO-ENTMCNC: 35 G/DL (ref 31.4–37.4)
MCV RBC AUTO: 87 FL (ref 82–98)
MONOCYTES # BLD AUTO: 0.11 THOUSAND/ÂΜL (ref 0.17–1.22)
MONOCYTES NFR BLD AUTO: 3 % (ref 4–12)
MUCOUS THREADS UR QL AUTO: ABNORMAL
NEUTROPHILS # BLD AUTO: 3.72 THOUSANDS/ÂΜL (ref 1.85–7.62)
NEUTS SEG NFR BLD AUTO: 85 % (ref 43–75)
NITRITE UR QL STRIP: NEGATIVE
NON-SQ EPI CELLS URNS QL MICRO: ABNORMAL /HPF
NRBC BLD AUTO-RTO: 0 /100 WBCS
PH UR STRIP.AUTO: 5.5 [PH]
PLATELET # BLD AUTO: 53 THOUSANDS/UL (ref 149–390)
PMV BLD AUTO: 10.9 FL (ref 8.9–12.7)
POTASSIUM SERPL-SCNC: 4.1 MMOL/L (ref 3.5–5.3)
PROCALCITONIN SERPL-MCNC: 3.93 NG/ML
PROT SERPL-MCNC: 5.8 G/DL (ref 6.4–8.4)
PROT UR STRIP-MCNC: ABNORMAL MG/DL
RBC # BLD AUTO: 4.37 MILLION/UL (ref 3.88–5.62)
RBC #/AREA URNS AUTO: ABNORMAL /HPF
SODIUM SERPL-SCNC: 143 MMOL/L (ref 135–147)
SP GR UR STRIP.AUTO: 1.01 (ref 1–1.03)
UROBILINOGEN UR STRIP-ACNC: <2 MG/DL
WBC # BLD AUTO: 4.37 THOUSAND/UL (ref 4.31–10.16)
WBC #/AREA URNS AUTO: ABNORMAL /HPF
WBC CLUMPS # UR AUTO: PRESENT /UL

## 2025-04-29 PROCEDURE — 99233 SBSQ HOSP IP/OBS HIGH 50: CPT | Performed by: PHYSICIAN ASSISTANT

## 2025-04-29 PROCEDURE — 80053 COMPREHEN METABOLIC PANEL: CPT | Performed by: PHYSICIAN ASSISTANT

## 2025-04-29 PROCEDURE — 97535 SELF CARE MNGMENT TRAINING: CPT

## 2025-04-29 PROCEDURE — 97112 NEUROMUSCULAR REEDUCATION: CPT

## 2025-04-29 PROCEDURE — 87040 BLOOD CULTURE FOR BACTERIA: CPT | Performed by: PHYSICIAN ASSISTANT

## 2025-04-29 PROCEDURE — 97530 THERAPEUTIC ACTIVITIES: CPT

## 2025-04-29 PROCEDURE — 87186 SC STD MICRODIL/AGAR DIL: CPT | Performed by: PHYSICIAN ASSISTANT

## 2025-04-29 PROCEDURE — 84145 PROCALCITONIN (PCT): CPT | Performed by: PHYSICIAN ASSISTANT

## 2025-04-29 PROCEDURE — 83605 ASSAY OF LACTIC ACID: CPT | Performed by: PHYSICIAN ASSISTANT

## 2025-04-29 PROCEDURE — 74022 RADEX COMPL AQT ABD SERIES: CPT

## 2025-04-29 PROCEDURE — 97116 GAIT TRAINING THERAPY: CPT

## 2025-04-29 PROCEDURE — 99233 SBSQ HOSP IP/OBS HIGH 50: CPT | Performed by: STUDENT IN AN ORGANIZED HEALTH CARE EDUCATION/TRAINING PROGRAM

## 2025-04-29 PROCEDURE — 87086 URINE CULTURE/COLONY COUNT: CPT | Performed by: PHYSICIAN ASSISTANT

## 2025-04-29 PROCEDURE — 87077 CULTURE AEROBIC IDENTIFY: CPT | Performed by: PHYSICIAN ASSISTANT

## 2025-04-29 PROCEDURE — 81001 URINALYSIS AUTO W/SCOPE: CPT | Performed by: PHYSICIAN ASSISTANT

## 2025-04-29 PROCEDURE — 87154 CUL TYP ID BLD PTHGN 6+ TRGT: CPT | Performed by: PHYSICIAN ASSISTANT

## 2025-04-29 PROCEDURE — 85025 COMPLETE CBC W/AUTO DIFF WBC: CPT | Performed by: PHYSICIAN ASSISTANT

## 2025-04-29 PROCEDURE — 82948 REAGENT STRIP/BLOOD GLUCOSE: CPT

## 2025-04-29 RX ORDER — OLANZAPINE 10 MG/2ML
5 INJECTION, POWDER, FOR SOLUTION INTRAMUSCULAR 2 TIMES DAILY PRN
Status: DISCONTINUED | OUTPATIENT
Start: 2025-04-29 | End: 2025-05-07 | Stop reason: HOSPADM

## 2025-04-29 RX ORDER — DOCUSATE SODIUM 100 MG/1
100 CAPSULE, LIQUID FILLED ORAL 2 TIMES DAILY
Status: DISCONTINUED | OUTPATIENT
Start: 2025-04-29 | End: 2025-05-01

## 2025-04-29 RX ORDER — QUETIAPINE FUMARATE 25 MG/1
25 TABLET, FILM COATED ORAL
Status: DISCONTINUED | OUTPATIENT
Start: 2025-04-29 | End: 2025-04-30

## 2025-04-29 RX ORDER — DEXAMETHASONE SODIUM PHOSPHATE 4 MG/ML
4 INJECTION, SOLUTION INTRA-ARTICULAR; INTRALESIONAL; INTRAMUSCULAR; INTRAVENOUS; SOFT TISSUE EVERY 8 HOURS SCHEDULED
Status: DISCONTINUED | OUTPATIENT
Start: 2025-04-29 | End: 2025-05-02

## 2025-04-29 RX ORDER — SODIUM CHLORIDE, SODIUM GLUCONATE, SODIUM ACETATE, POTASSIUM CHLORIDE, MAGNESIUM CHLORIDE, SODIUM PHOSPHATE, DIBASIC, AND POTASSIUM PHOSPHATE .53; .5; .37; .037; .03; .012; .00082 G/100ML; G/100ML; G/100ML; G/100ML; G/100ML; G/100ML; G/100ML
75 INJECTION, SOLUTION INTRAVENOUS CONTINUOUS
Status: DISCONTINUED | OUTPATIENT
Start: 2025-04-29 | End: 2025-04-30

## 2025-04-29 RX ORDER — ACETAMINOPHEN 10 MG/ML
1000 INJECTION, SOLUTION INTRAVENOUS ONCE
Status: COMPLETED | OUTPATIENT
Start: 2025-04-29 | End: 2025-04-29

## 2025-04-29 RX ORDER — SODIUM CHLORIDE, SODIUM GLUCONATE, SODIUM ACETATE, POTASSIUM CHLORIDE, MAGNESIUM CHLORIDE, SODIUM PHOSPHATE, DIBASIC, AND POTASSIUM PHOSPHATE .53; .5; .37; .037; .03; .012; .00082 G/100ML; G/100ML; G/100ML; G/100ML; G/100ML; G/100ML; G/100ML
1000 INJECTION, SOLUTION INTRAVENOUS ONCE
Status: COMPLETED | OUTPATIENT
Start: 2025-04-29 | End: 2025-04-29

## 2025-04-29 RX ADMIN — INSULIN GLARGINE 10 UNITS: 100 INJECTION, SOLUTION SUBCUTANEOUS at 21:37

## 2025-04-29 RX ADMIN — BISACODYL 10 MG: 10 SUPPOSITORY RECTAL at 09:13

## 2025-04-29 RX ADMIN — CHLORHEXIDINE GLUCONATE 0.12% ORAL RINSE 15 ML: 1.2 LIQUID ORAL at 21:03

## 2025-04-29 RX ADMIN — LEVETIRACETAM 500 MG: 500 TABLET, FILM COATED ORAL at 09:13

## 2025-04-29 RX ADMIN — SODIUM CHLORIDE, SODIUM GLUCONATE, SODIUM ACETATE, POTASSIUM CHLORIDE AND MAGNESIUM CHLORIDE 75 ML/HR: 526; 502; 368; 37; 30 INJECTION, SOLUTION INTRAVENOUS at 18:22

## 2025-04-29 RX ADMIN — DEXAMETHASONE SODIUM PHOSPHATE 4 MG: 4 INJECTION INTRA-ARTICULAR; INTRALESIONAL; INTRAMUSCULAR; INTRAVENOUS; SOFT TISSUE at 21:03

## 2025-04-29 RX ADMIN — QUETIAPINE 25 MG: 25 TABLET ORAL at 21:04

## 2025-04-29 RX ADMIN — ACETAMINOPHEN 1000 MG: 10 INJECTION INTRAVENOUS at 03:15

## 2025-04-29 RX ADMIN — HEPARIN SODIUM 5000 UNITS: 5000 INJECTION INTRAVENOUS; SUBCUTANEOUS at 14:21

## 2025-04-29 RX ADMIN — INSULIN LISPRO 5 UNITS: 100 INJECTION, SOLUTION INTRAVENOUS; SUBCUTANEOUS at 21:38

## 2025-04-29 RX ADMIN — SENNOSIDES 17.2 MG: 8.6 TABLET, FILM COATED ORAL at 21:04

## 2025-04-29 RX ADMIN — DOCUSATE SODIUM 100 MG: 100 CAPSULE, LIQUID FILLED ORAL at 21:39

## 2025-04-29 RX ADMIN — SODIUM CHLORIDE, SODIUM GLUCONATE, SODIUM ACETATE, POTASSIUM CHLORIDE AND MAGNESIUM CHLORIDE 75 ML/HR: 526; 502; 368; 37; 30 INJECTION, SOLUTION INTRAVENOUS at 05:30

## 2025-04-29 RX ADMIN — ALLOPURINOL 300 MG: 300 TABLET ORAL at 09:13

## 2025-04-29 RX ADMIN — SODIUM CHLORIDE, SODIUM GLUCONATE, SODIUM ACETATE, POTASSIUM CHLORIDE AND MAGNESIUM CHLORIDE 1000 ML: 526; 502; 368; 37; 30 INJECTION, SOLUTION INTRAVENOUS at 03:44

## 2025-04-29 RX ADMIN — INSULIN LISPRO 15 UNITS: 100 INJECTION, SOLUTION INTRAVENOUS; SUBCUTANEOUS at 17:24

## 2025-04-29 RX ADMIN — HEPARIN SODIUM 5000 UNITS: 5000 INJECTION INTRAVENOUS; SUBCUTANEOUS at 05:30

## 2025-04-29 RX ADMIN — POLYETHYLENE GLYCOL 3350 17 G: 17 POWDER, FOR SOLUTION ORAL at 09:13

## 2025-04-29 RX ADMIN — CHLORHEXIDINE GLUCONATE 0.12% ORAL RINSE 15 ML: 1.2 LIQUID ORAL at 09:13

## 2025-04-29 RX ADMIN — OLANZAPINE 5 MG: 10 INJECTION, POWDER, FOR SOLUTION INTRAMUSCULAR at 01:43

## 2025-04-29 RX ADMIN — OXYCODONE HYDROCHLORIDE 5 MG: 5 TABLET ORAL at 13:51

## 2025-04-29 RX ADMIN — HEPARIN SODIUM 5000 UNITS: 5000 INJECTION INTRAVENOUS; SUBCUTANEOUS at 21:04

## 2025-04-29 RX ADMIN — ATORVASTATIN CALCIUM 20 MG: 20 TABLET, FILM COATED ORAL at 09:13

## 2025-04-29 RX ADMIN — Medication 6 MG: at 21:03

## 2025-04-29 RX ADMIN — DEXAMETHASONE SODIUM PHOSPHATE 4 MG: 4 INJECTION INTRA-ARTICULAR; INTRALESIONAL; INTRAMUSCULAR; INTRAVENOUS; SOFT TISSUE at 14:21

## 2025-04-29 RX ADMIN — CEFTRIAXONE SODIUM 1000 MG: 10 INJECTION, POWDER, FOR SOLUTION INTRAVENOUS at 18:13

## 2025-04-29 NOTE — PLAN OF CARE
Problem: PAIN - ADULT  Goal: Verbalizes/displays adequate comfort level or baseline comfort level  Description: Interventions:- Encourage patient to monitor pain and request assistance- Assess pain using appropriate pain scale- Administer analgesics based on type and severity of pain and evaluate response- Implement non-pharmacological measures as appropriate and evaluate response- Notify physician/advanced practitioner if interventions unsuccessful or patient reports new pain  Outcome: Progressing     Problem: INFECTION - ADULT  Goal: Absence or prevention of progression during hospitalization  Description: INTERVENTIONS:- Assess and monitor for signs and symptoms of infection- Monitor lab/diagnostic results- Monitor all insertion sites, i.e. indwelling lines, tubes, and drains- [place] appropriate cooling/warming therapies per order- Administer medications as ordered- Instruct and encourage patient and family to use good hand hygiene technique- Identify and instruct in appropriate isolation precautions for identified infection/condition  Outcome: Progressing     Problem: DISCHARGE PLANNING  Goal: Discharge to home or other facility with appropriate resources  Description: INTERVENTIONS:- Identify barriers to discharge w/patient and caregiver- Arrange for needed discharge resources and transportation as appropriate- Identify discharge learning needs (meds, wound care, etc.)- Refer to Case Management Department for coordinating discharge planning if the patient needs post-hospital services based on physician/advanced practitioner order or complex needs related to functional status, cognitive ability, or social support system  Outcome: Progressing

## 2025-04-29 NOTE — PLAN OF CARE
Problem: OCCUPATIONAL THERAPY ADULT  Goal: Performs self-care activities at highest level of function for planned discharge setting.  See evaluation for individualized goals.  Description: Treatment Interventions: ADL retraining, Functional transfer training, Visual perceptual retraining, Endurance training, Cognitive reorientation, Patient/family training, Equipment evaluation/education, Compensatory technique education, Activityengagement  Equipment Recommended:  (tbd)       See flowsheet documentation for full assessment, interventions and recommendations.   Outcome: Progressing  Note: Limitation: Decreased ADL status, Decreased Safe judgement during ADL, Decreased cognition, Decreased endurance, Decreased self-care trans, Decreased high-level ADLs  Prognosis: Fair  Assessment: Pt seen for Occupational Therapy session with focus on activity tolerance, bed mob, functional transfers/mob, sitting balance and tolerance and standing tolerance and balance for pt engagement in UB/LB self-care tasks and toileting.  Pt cleared by ADELINE/Lyndsay  for pt participated in OT session. Pt presented supine/HOB raised pt awake/alert and agreeable to participate in therapy following pt identifiers confirmed. Pt did not report a therapy goal this session however pt was pleasant and cooperative with all therapy requests.  Pt required assist for  functional transfers/mob, LB and UB self-care 2* pt deconditioning, decreased balance and coordination.  Pt able to demonstrate good over all activity tolerance and standing at bathroom sink for grooming tasks and toileting.  Pt remains appropriate for   I ([employer]) Resources)Pt set up to bedside chair post session, chair alarm activated and all needs within pt reach  Recommendation: Physiatry Consult  Rehab Resource Intensity Level, OT: I (Maximum Resource Intensity)

## 2025-04-29 NOTE — ASSESSMENT & PLAN NOTE
Blood cultures with 1 out of 2 gram-negative rods  Start IV ceftriaxone  Repeat blood cultures on 4/30  Last BM on 4/25, ?  gut translocation  Follow abdomen x-ray to evaluate constipation  Monitor hemodynamics

## 2025-04-29 NOTE — ASSESSMENT & PLAN NOTE
Multifactorial in the setting of brain mass, vasogenic edema, LETY, medication induced and hospital-acquired delirium  Patient with increased lethargy today.  Zyprexa 10 mg at bedtime discontinued and patient started on Seroquel 25 mg at bedtime with as needed Zyprexa.  Discussed with palliative care  Previously on dexamethasone that was tapered last week. Discussed with oncology and plan to restart dexamethasone 4 mg 3 times daily with gradual taper over the next weeks  Continue delirium precautions  Trial VRC today.  Off restraints since 4/27

## 2025-04-29 NOTE — PROGRESS NOTES
"Progress Note - Palliative Care   Name: Alexis Dennison 64 y.o. male I MRN: [id number]  Unit/Bed#: [facility] 904-01 I Date of Admission: 3/29/2025   Date of Service: 4/29/2025 I Hospital Day: 31     Assessment & Plan  Palliative care encounter  Palliative diagnosis: newly diagnosed large B cell lymphoma    Symptom management:  D/c olanzapine and replaced with seroquel 25mg HS  Olanzapine remains available BID PRN  Goal to wean patient from restraints to enable therapy/discharge planning  Decadron d/c by primary after rapid taper, consider re-starting at a low morning dose for ongoing edema management/stimulation but d/c afternoon/HS dosing to minimize agitation  oxyIR 2.5-5mg PO Q4H PRN moderate-severe pain  Dilaudid 0.2mg IV Q2H PRN BT pain --> d/c in anticipation of discharge to [facility]  Acetaminophen 650mg Q6H PRN mild pain  Bowel regimen in place    Goals:  Level 1 code status  Disease focused care without limits placed  Patient initiated high-dose methotrexate + rituximab inpatient  Current focus on rehabilitation, hopeful for mid week discharge to [facility]    Decisional apparatus:  Patient does not have capacity on exam today.  If capacity is lost, patient's substitute decision maker would default to spouse and adult children by [place] Act 169.  ER contacts:  Elizabeth Chester  Daughter  [phone]  Guy Nadlo \"Naldo\" Phillip  Spouse  [phone]    Patient also has a son, Drew, and another daughter, Marina, who has down syndrome. Elizabeth is the main contact but family makes decisions as unit.    Advance Directive/Living Will/POLST: none on file    Social support:  Patient support system: spouse Naldo, 3 adult children, extended family.  Spouse Naldo lives locally and daughter Elizabeth is from [place] but Naldo prefers that she is primary contact due to language barrier (Chinese)  4/22: Met with Naldo along with palliative SW (Shikha and Brynn) and  (Kaylee #[id number]).  Provided medical update, supportive listening, answered questions to her " satisfaction  Normalized experience of patient/family  Provided anticipatory guidance  Advocated for patient/family with interdisciplinary care team    Coordination of care:  Reviewed case with RN, primary team    Follow up  Palliative Care will continue to follow  Please reach out via [employer] secure chat if questions or concerns arise.    We appreciate the invitation to be involved in this patient's care.   Brain tumor (HCC)  Admitted 3/29 with worsening confusion in the setting of recent ED visit with CTA head and neck 3/24 demonstrating multiple nonspecific hyperdense ependymal/subependymal nodules, largest located adjacent to the foramen of Monro (2 x 1.1 x 1 cm ) and f/u outpatient MRI 3/26 demonstrating multiple scattered foci of subependymal nodular enhancement throughout ventricles (left worse than right) with mild hydrocephalus (left worse than right), scattered nodular areas of enhancement in left anterior inferior basal ganglia involving left anterior commissure, and smaller foci of nodular enhancement along anteromedial aspect of left cerebral peduncle and left quirino.with concern for primary CNS malignancy  Per sister, both patient's parents with history of lymphoma  CTH on admission 3/29 stable subependymal nodules and left foramen of Monro region hyperdense lesion, dilation of left lateral ventricle and minimal left to right midline shift  4/3 imaging concerning for worsening hydrocephalus  2 non diagnostic lumbar punctures performed  MRI brain 4/11 with interval disease progression  4/13 developed worsening mentation and CTH demonstrated predominantly left sided obstructive hydrocephalus from lesion located adjacent to the foramen of Monro, now s/p left front EVD  4/14 underwent left frontal endoscopic biopsy and replacement of EVD  Preliminary pathology large B-cell lymphoma    Medical oncology following  Primary CNS lymphoma  Appreciate oncology input  methotrexate and rituximab started this  admission  Oncology considering 4 week methotrexate cycle to allow more time for rehab/recovery before next dose  Weekly rituximab  Encephalopathy  In the setting of the above  On exam, he is confused but interactive. Oriented to name only, does not recall , disoriented to all else.  EVD dislodged due to  agitation while admitted to ICU but did not need to be replaced secondary to stable ventricle size  Off restraints, on 1:1.  Improves with increased activity, hopeful for discharge to acute rehab pending course  Pulmonary nodule  CTA 3/29 demonstrates non specific 4 mm right lower lobe pulmonary nodule  Recommended 3 mo f/u  Liver lesion  CTA 3/29 demonstrates non specific 12 mm hypodense lesion within right hepatic lobe, MRI recommended  MRI 3/30 demonstrates no suspicious hepatic lesions, simple right hepatic lobe cyst noted    LETY (acute kidney injury) (HCC)  Nephrology following. Likely secondary to methotrexate and hypotensive episode. Improved. Leucovorin for supra therapeutic methotrexate levels completed.  Interval history:       Patient received 2 doses of olanzapine around 6pm and again overnight. Plan to switch to seroquel tonight. Had an episode of fever and lethargy overnight. No obvious source, primary considering re-starting decadron in setting of cerebral edema. During time of encounter he is resting comfortably, in no signs of distress, no agitation.     MEDICATIONS / ALLERGIES:     all current active meds have been reviewed    No Known Allergies    OBJECTIVE:    Physical Exam  Physical Exam  Constitutional:       Appearance: He is ill-appearing.   HENT:      Head: Atraumatic.   Eyes:      Conjunctiva/sclera: Conjunctivae normal.   Cardiovascular:      Rate and Rhythm: Normal rate.   Pulmonary:      Effort: No respiratory distress.   Abdominal:      Tenderness: There is no guarding.   Musculoskeletal:         General: No swelling.   Skin:     General: Skin is dry.   Neurological:      Comments:  Drowsy when not stimulated   Psychiatric:      Comments: Calm, not agitated         Lab Results:   Results from last 7 days   Lab Units 04/29/25  0348 04/28/25  0721 04/27/25  0524 04/26/25  0551   WBC Thousand/uL 4.37 7.63 8.72 9.58   HEMOGLOBIN g/dL 13.3 12.6 12.0 12.1   HEMATOCRIT % 38.0 35.8* 33.9* 35.2*   PLATELETS Thousands/uL 53* 53* 48* 61*   SEGS PCT % 85*  --  77* 80*   MONO PCT % 3*  --  3* 3*   EOS PCT % 1  --  2 1     Results from last 7 days   Lab Units 04/29/25  0400 04/28/25  0721 04/27/25  0524 04/26/25  0551   POTASSIUM mmol/L 4.1 3.8 3.8 3.9   CHLORIDE mmol/L 103 105 106 107   CO2 mmol/L 28 30 30 29   BUN mg/dL 46* 43* 42* 45*   CREATININE mg/dL 2.04* 1.68* 1.60* 1.55*   CALCIUM mg/dL 9.6 9.5 9.5 9.4   ALK PHOS U/L 64  --  46 41   ALT U/L 62*  --  83* 101*   AST U/L 15  --  23 39       Imaging Studies: reviewed pertinent studies   EKG, Pathology, and Other Studies: reviewed pertinent studies    Counseling / Coordination of Care    Total floor / unit time spent today 25+ minutes. Greater than 50% of total time was spent with the patient and / or family counseling and / or coordination of care. A description of the counseling / coordination of care: symptom assessment and management, medication review, medication adjustment, chart review, and coordination with primary team and PCA.

## 2025-04-29 NOTE — OCCUPATIONAL THERAPY NOTE
Occupational Therapy Progress Note     Patient Name: Alexis Dennison  Today's Date: 4/29/2025  Problem List  Principal Problem:    Primary CNS lymphoma  Active Problems:    Type 2 diabetes mellitus with hyperglycemia, without long-term current use of insulin (HCC)    HTN, goal below 130/80    Mixed hyperlipidemia    Brain tumor (HCC)    Thyroid nodule    Pulmonary nodule    Liver lesion    Constipation    Ambulatory dysfunction    Encephalopathy    Goals of care, counseling/discussion    Palliative care encounter    LETY (acute kidney injury) (HCC)    Metabolic alkalosis       OCCUPATIONAL THERAPY         04/29/25 1329   OT Last Visit   OT Visit Date 04/29/25   Note Type   Note Type Treatment   Pain Assessment   Pain Assessment Tool 0-10   Pain Rating: FLACC (Rest) - Face 1   Pain Rating: FLACC (Rest) - Legs 0   Pain Rating: FLACC (Rest) - Activity 0   Pain Rating: FLACC (Rest) - Cry 0   Pain Rating: FLACC (Rest) - Consolability 0   Score: FLACC (Rest) 1   Pain Rating: FLACC (Activity) - Face 1   Pain Rating: FLACC (Activity) - Legs 0   Pain Rating: FLACC (Activity) - Activity 0   Pain Rating: FLACC (Activity) - Cry 0   Pain Rating: FLACC (Activity) - Consolability 0   Score: FLACC (Activity) 1   Restrictions/Precautions   Weight Bearing Precautions Per Order No   Other Precautions 1:1;Impulsive;Cognitive;Bed Alarm;Chair Alarm;Fall Risk   Lifestyle   Autonomy I w/ ADLs, I w/ IADLs, I w/ functional mobility and transfers   Reciprocal Relationships Wife and children   Service to Others Full time employment   Intrinsic Gratification Reading   ADL   Where Assessed Standing at sink   Grooming Assistance 5  Supervision/Setup   Grooming Deficit Setup;Verbal cueing;Supervision/safety   UB Bathing Assistance 3  Moderate Assistance   LB Bathing Assistance 2  Maximal Assistance   UB Dressing Assistance 4  Minimal Assistance   LB Dressing Assistance 4  Minimal Assistance   LB Dressing Deficit Don/doff R sock;Don/doff L sock;Thread  RLE into pants;Thread LLE into pants;Pull up over hips   Toileting Assistance  3  Moderate Assistance   Toileting Deficit Clothing management up;Clothing management down;Perineal hygiene   Toileting Comments In stance   Bed Mobility   Supine to Sit 5  Supervision   Additional items Assist x 1   Transfers   Sit to Stand 4  Minimal assistance   Additional items Assist x 1   Stand to Sit 4  Minimal assistance   Additional items Assist x 1   Functional Mobility   Functional Mobility 4  Minimal assistance   Additional Comments Community distance HHA progressing to Min-Mod of 1 as session progressed   Additional items Hand hold assistance   Toilet Transfers   Toilet Transfer From   (HHA with A x 2 Standing at toilet to void)   Toilet Transfer Type To and from   Toilet Transfer to Standard toilet   Toilet Transfer Technique Ambulating  (standing)   Subjective   Subjective pt with garbled speech however   Cognition   Overall Cognitive Status Impaired   Arousal/Participation Alert;Responsive;Cooperative   Attention Attends with cues to redirect   Orientation Level Oriented to person   Memory Decreased recall of precautions;Decreased recall of recent events;Decreased short term memory   Following Commands Follows one step commands with increased time or repetition   Additional Activities   Additional Activities Other (Comment)     Additional Activities Comments Pt partcipated in Ring Toss with Min A to maintain standing balance/dynamic standing balance .   Activity Tolerance   Activity Tolerance Patient tolerated treatment well   Assessment   Assessment Pt seen for Occupational Therapy session with focus on activity tolerance, bed mob, functional transfers/mob, sitting balance and tolerance and standing tolerance and balance for pt engagement in UB/LB self-care tasks and toileting.  Pt cleared by ADELINE/Lyndsay  for pt participated in OT session. Pt presented supine/HOB raised pt awake/alert and agreeable to participate in  therapy following pt identifiers confirmed. Pt did not report a therapy goal this session however pt was pleasant and cooperative with all therapy requests.  Pt required assist for  functional transfers/mob, LB and UB self-care 2* pt deconditioning, decreased balance and coordination.  Pt able to demonstrate good over all activity tolerance and standing at bathroom sink for grooming tasks and toileting.  Pt remains appropriate for   I (Maximum Resources) Resources)Pt set up to bedside chair post session, chair alarm activated and all needs within pt reach   Plan   Treatment Interventions ADL retraining;Functional transfer training   Goal Expiration Date 05/13/25   OT Treatment Day 8   OT Frequency 2-3x/wk   Discharge Recommendation   Rehab Resource Intensity Level, OT I (Maximum Resource Intensity)   AM-PAC Daily Activity Inpatient   Lower Body Dressing 2   Bathing 2   Toileting 2   Upper Body Dressing 2   Grooming 3   Eating 3   Daily Activity Raw Score 14   Daily Activity Standardized Score (Calc for Raw Score >=11) 33.39   Turning Head Towards Sound 2   Follow Simple Instructions 2   Low Function Daily Activity Raw Score 18   Low Function Daily Activity Standardized Score  30.17   AM-PAC Applied Cognition Inpatient   Following a Speech/Presentation 1   Understanding Ordinary Conversation 3   Taking Medications 2   Remembering Where Things Are Placed or Put Away 2   Remembering List of 4-5 Errands 1   Taking Care of Complicated Tasks 1   Applied Cognition Raw Score 10   Applied Cognition Standardized Score 24.98   Barthel Index   Grooming Score 0   Dressing Score 0   Toilet Use Score 5   Transfers (Bed/Chair) Score 5   Mobility (Level Surface) Score 0           Jeanette Berman OTR/L

## 2025-04-29 NOTE — QUICK NOTE
Evaluated for T 103. Somewhat lethargic due to recent zyprexa administration for agitation. Recent baseline appears he has been confused, oriented to self only, with mixed aphasia and intermittently following commands with repetition.     General nontoxic appearing, rigors earlier per nursing now resolved.   Skin biopsy incision and EVD site without erythema or drainage  Neuro not verbalizing or following commands but purposeful movement of all 4 extremities, no nuchal rigidity.   CV tachycardic, regular rhythm  Lungs clear, no cough, on RA  Abd- nondistended, mild tenderness to palpation of suprapubic area.   condom cath in place draining yellow urine, urinating without issue per RN, bladder scanned for 349 cc  Ext- warm, no edema    A/P SIRS, no clear source of infection, vs fever in setting of malignancy/DLBCL  Given IV tylenol, 1L bolus. Lactate and WBC wnl, no neutropenia, procal 3 but in setting of malignancy. Start IVF for worsening LETY with Cr 2  Check UA given suprapubic discomfort. F/u BCx2. Will hold off on abx.

## 2025-04-29 NOTE — PHYSICAL THERAPY NOTE
PHYSICAL THERAPY NOTE          Patient Name: Alexis Dennison  Today's Date: 4/29/2025 04/29/25 1330   PT Last Visit   PT Visit Date 04/29/25   Note Type   Note Type Treatment   Pain Assessment   Pain Assessment Tool 0-10   Pain Score No Pain   Restrictions/Precautions   Weight Bearing Precautions Per Order No   Other Precautions 1:1;Impulsive;Cognitive;Chair Alarm;Bed Alarm;Fall Risk   General   Chart Reviewed Yes   Response to Previous Treatment Patient unable to report, no changes reported from family or staff   Family/Caregiver Present No   Cognition   Overall Cognitive Status Impaired   Arousal/Participation Alert;Responsive;Cooperative   Attention Attends with cues to redirect   Orientation Level Oriented to person   Memory Decreased recall of precautions;Decreased recall of recent events;Decreased short term memory   Following Commands Follows one step commands with increased time or repetition   Comments Pt much more cooperative and redirectable this date. Continues to get distracted. i.e loose strings on gown. flat affect but more conversive- mummbled speech at times noted. Continues to have decreased safety awareness + insight   Bed Mobility   Supine to Sit 5  Supervision   Additional items HOB elevated;Bedrails;Increased time required   Sit to Supine 5  Supervision   Additional items HOB elevated;Bedrails;Increased time required   Additional Comments pt found supine in bed upon arrival. pt left sitting OOB in recliner with alarm on + all needs within reach   Transfers   Sit to Stand 4  Minimal assistance   Additional items Assist x 1;Increased time required;Verbal cues   Stand to Sit 4  Minimal assistance   Additional items Assist x 1;Increased time required;Verbal cues   Additional Comments transfers with HHA; performed 5x STS t/o session   Ambulation/Elevation   Gait pattern Excessively slow;Short stride;Inconsistent  "maria esther;Foward flexed;Shuffling   Gait Assistance 4  Minimal assist   Additional items Assist x 1;Verbal cues;Tactile cues  (initially MIN Ax2 progressing to MIN Ax1)   Assistive Device Other (Comment)  (b/l HHA)   Distance 100' + 100\" + 2 x 10'   Stair Management Assistance Not tested   Ambulation/Elevation Additional Comments unsteady at times with multiple stumbles requiring min- mod A to correct.   Balance   Static Sitting Fair   Dynamic Sitting Fair   Static Standing Fair -   Dynamic Standing Poor +   Ambulatory Poor +   Endurance Deficit   Endurance Deficit Yes   Endurance Deficit Description impaired cognition, generalized weakness + fatigue   Activity Tolerance   Activity Tolerance Other (Comment);Patient limited by fatigue  (impaired cognition)   Medical Staff Made Aware OT Jeanette; co-session completed this date 2* increased medical complexity and multiple co-morbidities   Nurse Made Aware RN cleared   Exercises   Neuro re-ed pt participating in ring toss activity with min A for required for balance. VC required to complete   Assessment   Prognosis Good   Problem List Decreased strength;Decreased endurance;Decreased mobility;Decreased coordination;Impaired balance;Decreased cognition;Impaired judgement;Decreased safety awareness   Assessment Pt seen for PT treatment session this date. Therapy session focused on bed mobility, functional transfers, gait training and endurance training in order to improve overall mobility and independence. Pt requires assist of 1 + SBA of another for safety. Pt much more alert and cooperative this date. Able to participate in 3x ambulation trials with HHA, cues for safety and obstacle navigation. Pt was able to toilet this date as well as bush teeth with min A for steadying balance. Participated in ring toss with min A for safety. Pt making steady progress toward goals. Pt was left sitting OOB in recliner with alarm on at the end of PT session with all needs in reach. Pt would " benefit from continued PT services while in hospital to address remaining limitations. PT to continue to follow pt and recommends level 1. The patient's AM-[facility] Basic Mobility Inpatient Short Form Raw Score is 16. A Raw score of less than or equal to 16 suggests the patient may benefit from discharge to post-acute rehabilitation services. Please also refer to the recommendation of the Physical Therapist for safe discharge planning.   Barriers to Discharge Inaccessible home environment;Decreased caregiver support   Goals   Patient Goals to sit in chair and to go outside    STG Expiration Date 05/09/25   PT Treatment Day 8   Plan   Treatment/Interventions ADL retraining;Functional transfer training;LE strengthening/ROM;Elevations;Therapeutic exercise;Endurance training;Patient/family training;Equipment eval/education;Bed mobility;Gait training;Spoke to nursing;Spoke to case management;OT   Progress Progressing toward goals   PT Frequency 3-5x/wk   Discharge Recommendation   Rehab Resource Intensity Level, PT I (Maximum Resource Intensity)   AM-PAC Basic Mobility Inpatient   Turning in Flat Bed Without Bedrails 3   Lying on Back to Sitting on Edge of Flat Bed Without Bedrails 3   Moving Bed to Chair 3   Standing Up From Chair Using Arms 3   Walk in Room 2   Climb 3-5 Stairs With Railing 2   Basic Mobility Inpatient Raw Score 16   Basic Mobility Standardized Score 38.32   [facility] Highest Level Of Mobility   -HLM Goal 5: Stand one or more mins   -HLM Achieved 7: Walk 25 feet or more   Education   Education Provided Mobility training   Patient Reinforcement needed   End of Consult   Patient Position at End of Consult Bed/Chair alarm activated;All needs within reach;Bedside chair  (1:1 virtual)     Deepthi Granados, PT, DPT

## 2025-04-29 NOTE — PROGRESS NOTES
"Progress Note - Hospitalist   Name: Alexis Dennison 64 y.o. male I MRN: [id number]  Unit/Bed#: [facility] 904-01 I Date of Admission: 3/29/2025   Date of Service: 4/29/2025 I Hospital Day: 31    Assessment & Plan  Primary central nervous system (CNS) lymphoma  Patient with development of worsening confusion, recently seen at the [facility] ED on 3/24/2025 with CT head showing brain lesion   MRI of the brain 3/26/2025 concerning for \"multiple scattered areas of subependymoma nodular enhancement throughout ventricles with mild hydrocephalus left worse than right, scattered nodular areas of enhancement in left anterior inferior basal ganglia involving left anterior commissure, and smaller foci of nodular enhancement along anteromedial aspect of the left cerebral peduncle and left quirino.\"  With brain compression  (minimal left to right shift), mild hydrocephalus as evidenced by imaging  S/p LP on 3/31. 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  Lymphoma/leukemia panel nondiagnostic  CT head on 4/3 with interval increase in ventricular caliber concerning for worsening hydrocephalus  Repeat LP on 4/7 was again undiagnostic.   MRI of the brain from 4/11-\"Interval enlargement of the dominant left anterior basal ganglionic mass lesion with greater surrounding vasogenic edema and mass effect. 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  S/p EVD, self removed over 4/26 weekend  Completed keppra 500mg BID x 7 days for postoperative seizure ppx per neurosurgery  Patient started on chemotherapy while inpatient.  Oncology input reviewed, plan for every 2 weeks methotrexate (cycle 1 received on 4/18, cycle 2 planned for 5/1) and weekly rituximab for 8 weeks (received on 4/23)   Patient evaluated by [facility] who accepted patient to receive therapy while receiving weekly " oncologic treatments as above.  Awaiting medical stability  Encephalopathy  Multifactorial in the setting of brain mass, vasogenic edema, LETY, medication induced and hospital-acquired delirium  Patient with increased lethargy today.  Zyprexa 10 mg at bedtime discontinued and patient started on Seroquel 25 mg at bedtime with as needed Zyprexa.  Discussed with palliative care  Previously on dexamethasone that was tapered last week. Discussed with oncology and plan to restart dexamethasone 4 mg 3 times daily with gradual taper over the next weeks  Continue delirium precautions  Trial VRC today.  Off restraints since 4/27  Sepsis (HCC)  As evidenced on 4/29 morning with temperature and tachycardia.  Worsening kidney function.  Lactate within normal limits  S/p 1 L bolus, continue IV fluids  Elevated procalcitonin in the setting of recent rituximab  UA with innumerable bacteria and large leukocytes, more concerning for UTI in comparison to UA from 4/25  Suspect UTI versus GI pathology.  No respiratory symptoms or findings of skin infection at this time  Follow urine culture  Blood culture with 1 out of 2 gram-negative rods  Start IV ceftriaxone  Gram-negative bacteremia  Blood cultures with 1 out of 2 gram-negative rods  Start IV ceftriaxone  Repeat blood cultures on 4/30  Last BM on 4/25, ?  gut translocation  Follow abdomen x-ray to evaluate constipation  Monitor hemodynamics  Constipation  Last BM on 4/25  Continue senna, MiraLAX and suppository.  Add Colace  Ambulate patient as able  LETY (acute kidney injury) (HCC)  Lab Results   Component Value Date    CREATININE 2.04 (H) 04/29/2025    CREATININE 1.68 (H) 04/28/2025    CREATININE 1.60 (H) 04/27/2025     Baseline creatinine around 0.9, peak creatinine 2.7  Patient was evaluated by nephrology during this hospital stay and given improvement of creatinine with IV fluids signed off on 4/25  Creatinine slightly higher today to 2.04 however patient more lethargic with  decreased oral intake   Continue IV fluids and repeat BMP in the morning  Ambulatory dysfunction  Plan for acute rehab once medically stable  Fall precautions  Ambulate patient  Type 2 diabetes mellitus with hyperglycemia, without long-term current use of insulin (HCC)  Hemoglobin A1c 6.6% on 2/22/2025  PTA metformin 1000 mg daily, holding  On Lantus 10 units at bedtime with insulin sliding scale  Hypoglycemia protocol  Mixed hyperlipidemia  Continue statin  HTN, goal below 130/80  PTA losartan and hydrochlorothiazide, holding in the setting of soft BP   BP reviewed  Liver lesion  MRI obtained: No suspicious hepatic lesions.  There is a simple right hepatic lobe cyst.  Mild diffuse hepatic steatosis.  LFTs stable  Outpatient follow up advised  Thyroid nodule  Incidental findings on CT scan  Nonemergent thyroid ultrasound for follow-up in the outpatient setting  Pulmonary nodule  Imaging with 4 mm right lower lobe pulmonary nodule.  CT chest 3-month follow-up    VTE Pharmacologic Prophylaxis: VTE Score: 5 High Risk (Score >/= 5) - Pharmacological DVT Prophylaxis Ordered: heparin. Sequential Compression Devices Ordered.    Mobility:   Basic Mobility Inpatient Raw Score: 13  JH-HLM Goal: 4: Move to chair/commode  JH-HLM Achieved: 8: Walk 250 feet ot more  JH-HLM Goal achieved. Continue to encourage appropriate mobility.    Patient Centered Rounds: I performed bedside rounds with nursing staff today.   Discussions with Specialists or Other Care Team Provider: , oncology, palliative care    Education and Discussions with Family / Patient: Updated  (daughter) via phone.    Current Length of Stay: 31 day(s)  Current Patient Status: Inpatient   Certification Statement: The patient will continue to require additional inpatient hospital stay due to as above  Discharge Plan: Anticipate discharge in 48-72 hrs to rehab facility.    Code Status: Level 1 - Full Code    Subjective   Overnight patient met  SIRS criteria with fever and tachycardia.  Patient was also noted to be lethargic.  Infectious workup obtained and patient monitored off antibiotics.  This morning he is awake but continues to be confused.  Denies pain.  Per chart last BM on 4/25.  Patient has been tolerating oral intake, no nausea or vomiting reported.    Objective :  Temp:  [97.3 °F (36.3 °C)-103.4 °F (39.7 °C)] 98.3 °F (36.8 °C)  HR:  [] 76  BP: ()/(51-92) 88/51  Resp:  [17-20] 17  SpO2:  [92 %-98 %] 94 %  O2 Device: None (Room air)    Body mass index is 24.67 kg/m².     Input and Output Summary (last 24 hours):     Intake/Output Summary (Last 24 hours) at 4/29/2025 0852  Last data filed at 4/29/2025 0435  Gross per 24 hour   Intake 240 ml   Output 1310 ml   Net -1070 ml       Physical Exam  Vitals and nursing note reviewed.   Constitutional:       General: He is not in acute distress.     Appearance: He is well-developed.   HENT:      Head: Normocephalic and atraumatic.   Eyes:      Conjunctiva/sclera: Conjunctivae normal.   Cardiovascular:      Rate and Rhythm: Normal rate and regular rhythm.   Pulmonary:      Effort: No respiratory distress.      Breath sounds: Normal breath sounds. No wheezing, rhonchi or rales.   Abdominal:      General: Bowel sounds are normal. There is no distension.      Palpations: Abdomen is soft.      Tenderness: There is no abdominal tenderness.   Musculoskeletal:         General: No swelling.      Cervical back: Neck supple.   Skin:     General: Skin is warm and dry.      Capillary Refill: Capillary refill takes less than 2 seconds.   Neurological:      Mental Status: He is alert.      Comments: Aphasia  Patient reports he is in [place] but does not know where, unaware of time           Lines/Drains:  Lines/Drains/Airways       Active Status       Name Placement date Placement time Site Days    PICC Line 04/16/25 Right Basilic 04/16/25  1526  Basilic  12    External Urinary Catheter Medium 04/24/25   1000  -- 4                    Central Line:  Goal for removal: Will discontinue when meds requiring line are completed.               Lab Results: I have reviewed the following results:   Results from last 7 days   Lab Units 04/29/25  0348   WBC Thousand/uL 4.37   HEMOGLOBIN g/dL 13.3   HEMATOCRIT % 38.0   PLATELETS Thousands/uL 53*   SEGS PCT % 85*   LYMPHO PCT % 11*   MONO PCT % 3*   EOS PCT % 1     Results from last 7 days   Lab Units 04/29/25  0400   SODIUM mmol/L 143   POTASSIUM mmol/L 4.1   CHLORIDE mmol/L 103   CO2 mmol/L 28   BUN mg/dL 46*   CREATININE mg/dL 2.04*   ANION GAP mmol/L 12   CALCIUM mg/dL 9.6   ALBUMIN g/dL 3.5   TOTAL BILIRUBIN mg/dL 1.49*   ALK PHOS U/L 64   ALT U/L 62*   AST U/L 15   GLUCOSE RANDOM mg/dL 143*         Results from last 7 days   Lab Units 04/29/25  0749 04/29/25  0612 04/29/25  0155 04/28/25  2124 04/28/25  1550 04/28/25  1111 04/28/25  0633 04/27/25  2207 04/27/25  1940 04/27/25  1548 04/27/25  1121 04/27/25  0738   POC GLUCOSE mg/dl 161* 144* 150* 115 161* 188* 120 142* 158* 229* 194* 123         Results from last 7 days   Lab Units 04/29/25  0406 04/29/25  0357   LACTIC ACID mmol/L  --  1.6   PROCALCITONIN ng/ml 3.93*  --        Recent Cultures (last 7 days):   Results from last 7 days   Lab Units 04/29/25  0341   BLOOD CULTURE  Received in Microbiology Lab. Culture in Progress.  Received in Microbiology Lab. Culture in Progress.       Imaging Results Review: No pertinent imaging studies reviewed.  Other Study Results Review: No additional pertinent studies reviewed.    Last 24 Hours Medication List:     Current Facility-Administered Medications:     acetaminophen (TYLENOL) tablet 650 mg, Q6H PRN    allopurinol (ZYLOPRIM) tablet 300 mg, Daily    alteplase (CATHFLO) injection 2 mg, Q1MIN PRN    alteplase (CATHFLO) injection 2 mg, Q1MIN PRN    aluminum-magnesium hydroxide-simethicone (MAALOX) oral suspension 30 mL, Q4H PRN    atorvastatin (LIPITOR) tablet 20 mg, Daily     bisacodyl (DULCOLAX) rectal suppository 10 mg, Daily    calcium carbonate (TUMS) chewable tablet 1,000 mg, Daily PRN    chlorhexidine (PERIDEX) 0.12 % oral rinse 15 mL, Q12H CAIT    heparin (porcine) subcutaneous injection 5,000 Units, Q8H CAIT    HYDROmorphone HCl (DILAUDID) injection 0.2 mg, Q2H PRN    insulin glargine (LANTUS) subcutaneous injection 10 Units 0.1 mL, HS    insulin lispro (HumALOG/ADMELOG) 100 units/mL subcutaneous injection 5-25 Units, TID AC **AND** Fingerstick Glucose (POCT), TID AC    insulin lispro (HumALOG/ADMELOG) 100 units/mL subcutaneous injection 5-25 Units, HS    levETIRAcetam (KEPPRA) tablet 500 mg, Q12H CAIT    melatonin tablet 6 mg, HS    multi-electrolyte (Plasmalyte-A/Isolyte-S PH 7.4/Normosol-R) IV solution, Continuous, Last Rate: 75 mL/hr (04/29/25 0530)    OLANZapine (ZyPREXA) IM injection 5 mg, Q8H PRN    OLANZapine (ZyPREXA) tablet 10 mg, HS    ondansetron (ZOFRAN) injection 4 mg, Q6H PRN    oxyCODONE (ROXICODONE) split tablet 2.5 mg, Q4H PRN **OR** oxyCODONE (ROXICODONE) IR tablet 5 mg, Q4H PRN    pantoprazole (PROTONIX) EC tablet 40 mg, Early Morning    polyethylene glycol (MIRALAX) packet 17 g, Daily    senna (SENOKOT) tablet 17.2 mg, HS    sodium chloride 0.9 % infusion, Once PRN, Last Rate: Stopped (04/17/25 2051)    sodium chloride 0.9 % infusion, Once PRN    sodium chloride 0.9 % infusion, Once PRN    Administrative Statements   Today, Patient Was Seen By: Dionne Huang MD      **Please Note: This note may have been constructed using a voice recognition system.**

## 2025-04-29 NOTE — ASSESSMENT & PLAN NOTE
As evidenced on 4/29 morning with temperature and tachycardia.  Worsening kidney function.  Lactate within normal limits  S/p 1 L bolus, continue IV fluids  Elevated procalcitonin in the setting of recent rituximab  UA with innumerable bacteria and large leukocytes, more concerning for UTI in comparison to UA from 4/25  Suspect UTI versus GI pathology.  No respiratory symptoms or findings of skin infection at this time  Follow urine culture  Blood culture with 1 out of 2 gram-negative rods  Start IV ceftriaxone

## 2025-04-29 NOTE — ASSESSMENT & PLAN NOTE
Nephrology following. Likely secondary to methotrexate and hypotensive episode. Improved. Leucovorin for supra therapeutic methotrexate levels completed.

## 2025-04-29 NOTE — ASSESSMENT & PLAN NOTE
Lab Results   Component Value Date    CREATININE 2.04 (H) 04/29/2025    CREATININE 1.68 (H) 04/28/2025    CREATININE 1.60 (H) 04/27/2025     Baseline creatinine around 0.9, peak creatinine 2.7  Patient was evaluated by nephrology during this hospital stay and given improvement of creatinine with IV fluids signed off on 4/25  Creatinine slightly higher today to 2.04 however patient more lethargic with decreased oral intake   Continue IV fluids and repeat BMP in the morning

## 2025-04-29 NOTE — PLAN OF CARE
Problem: PHYSICAL THERAPY ADULT  Goal: Performs mobility at highest level of function for planned discharge setting.  See evaluation for individualized goals.  Description: Treatment/Interventions: LE strengthening/ROM, Functional transfer training, Elevations, Therapeutic exercise, Cognitive reorientation, Endurance training, Bed mobility, Equipment eval/education, Patient/family training, Gait training, Spoke to nursing, Spoke to case management, OT, Continued evaluation, Compensatory technique education, Family          See flowsheet documentation for full assessment, interventions and recommendations.  Outcome: Progressing     Pt seen for PT treatment session this date. Therapy session focused on bed mobility, functional transfers, gait training and endurance training in order to improve overall mobility and independence. Pt requires assist of 1 + SBA of another for safety. Pt much more alert and cooperative this date. Able to participate in 3x ambulation trials with HHA, cues for safety and obstacle navigation. Pt was able to toilet this date as well as bush teeth with min A for steadying balance. Participated in ring toss with min A for safety. Pt making steady progress toward goals. Pt was left sitting OOB in recliner with alarm on at the end of PT session with all needs in reach. Pt would benefit from continued PT services while in hospital to address remaining limitations. PT to continue to follow pt and recommends level 1. The patient's AM-PAC Basic Mobility Inpatient Short Form Raw Score is 16. A Raw score of less than or equal to 16 suggests the patient may benefit from discharge to post-acute rehabilitation services. Please also refer to the recommendation of the Physical Therapist for safe discharge planning.

## 2025-04-29 NOTE — QUICK NOTE
Contacted by primary team in regards to patient's recent weaning of steroids over the last few days, with concern for progressive confusion and developing edema with steroid wean. Discussed plan with team - at this time, reasonable to restart Decadron 4 mg at TID dosing, with plan to wean down steroids gradually. Heme onc will remain available if any further questions arise.

## 2025-04-29 NOTE — ASSESSMENT & PLAN NOTE
"Palliative diagnosis: newly diagnosed large B cell lymphoma    Symptom management:  D/c olanzapine and replaced with seroquel 25mg HS  Olanzapine remains available BID PRN  Goal to wean patient from restraints to enable therapy/discharge planning  Decadron d/c by primary after rapid taper, consider re-starting at a low morning dose for ongoing edema management/stimulation but d/c afternoon/HS dosing to minimize agitation  oxyIR 2.5-5mg PO Q4H PRN moderate-severe pain  Dilaudid 0.2mg IV Q2H PRN BT pain --> d/c in anticipation of discharge to [facility]  Acetaminophen 650mg Q6H PRN mild pain  Bowel regimen in place    Goals:  Level 1 code status  Disease focused care without limits placed  Patient initiated high-dose methotrexate + rituximab inpatient  Current focus on rehabilitation, hopeful for mid week discharge to [facility]    Decisional apparatus:  Patient does not have capacity on exam today.  If capacity is lost, patient's substitute decision maker would default to spouse and adult children by [place] Act 169.  ER contacts:  Elizabeth Chester  Daughter  [phone]  Guy Naldo \"Naldo\" Phillip  Spouse  [phone]    Patient also has a son, Drew, and another daughter, Marina, who has down syndrome. Elizabeth is the main contact but family makes decisions as unit.    Advance Directive/Living Will/POLST: none on file    Social support:  Patient support system: spouse Naldo, 3 adult children, extended family.  Spouse Naldo lives locally and daughter Elizabeth is from [place] but Naldo prefers that she is primary contact due to language barrier (Chinese)  4/22: Met with Naldo along with palliative SW (Shikha and Brynn) and  (Kaylee #[id number]).  Provided medical update, supportive listening, answered questions to her satisfaction  Normalized experience of patient/family  Provided anticipatory guidance  Advocated for patient/family with interdisciplinary care team    Coordination of care:  Reviewed case with RN, primary team    Follow up  Palliative " Care will continue to follow  Please reach out via [employer] secure chat if questions or concerns arise.    We appreciate the invitation to be involved in this patient's care.

## 2025-04-29 NOTE — ASSESSMENT & PLAN NOTE
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  Lymphoma/leukemia panel nondiagnostic  CT head on 4/3 with interval increase in ventricular caliber concerning for worsening hydrocephalus  Repeat LP on 4/7 was again undiagnostic.   MRI of the brain from 4/11-\"Interval enlargement of the dominant left anterior basal ganglionic mass lesion with greater surrounding vasogenic edema and mass effect. 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  S/p EVD, self removed over 4/26 weekend  Completed keppra 500mg BID x 7 days for postoperative seizure ppx per neurosurgery  Patient started on chemotherapy while inpatient.  Oncology input reviewed, plan for every 2 weeks methotrexate (cycle 1 received on 4/18, cycle 2 planned for 5/1) and weekly rituximab for 8 weeks (received on 4/23)   Patient evaluated by [facility] who accepted patient to receive therapy while receiving weekly oncologic treatments as above.  Awaiting medical stability  "

## 2025-04-29 NOTE — ASSESSMENT & PLAN NOTE
Hemoglobin A1c 6.6% on 2/22/2025  PTA metformin 1000 mg daily, holding  On Lantus 10 units at bedtime with insulin sliding scale  Hypoglycemia protocol

## 2025-04-29 NOTE — ASSESSMENT & PLAN NOTE
In the setting of the above  On exam, he is confused but interactive. Oriented to name only, does not recall , disoriented to all else.  EVD dislodged due to  agitation while admitted to ICU but did not need to be replaced secondary to stable ventricle size  Off restraints, on 1:1.  Improves with increased activity, hopeful for discharge to acute rehab pending course

## 2025-04-30 ENCOUNTER — APPOINTMENT (INPATIENT)
Dept: RADIOLOGY | Facility: HOSPITAL | Age: 65
DRG: 820 | End: 2025-04-30
Payer: COMMERCIAL

## 2025-04-30 DIAGNOSIS — C83.390 PRIMARY CENTRAL NERVOUS SYSTEM (CNS) LYMPHOMA: Primary | ICD-10-CM

## 2025-04-30 PROBLEM — B96.20 E COLI BACTEREMIA: Status: ACTIVE | Noted: 2025-04-29

## 2025-04-30 LAB
ANION GAP SERPL CALCULATED.3IONS-SCNC: 11 MMOL/L (ref 4–13)
BACTERIA UR QL AUTO: ABNORMAL /HPF
BILIRUB UR QL STRIP: NEGATIVE
BUN SERPL-MCNC: 49 MG/DL (ref 5–25)
CALCIUM SERPL-MCNC: 9.1 MG/DL (ref 8.4–10.2)
CHLORIDE SERPL-SCNC: 102 MMOL/L (ref 96–108)
CLARITY UR: CLEAR
CO2 SERPL-SCNC: 28 MMOL/L (ref 21–32)
COLOR UR: ABNORMAL
CREAT SERPL-MCNC: 1.74 MG/DL (ref 0.6–1.3)
ERYTHROCYTE [DISTWIDTH] IN BLOOD BY AUTOMATED COUNT: 11.8 % (ref 11.6–15.1)
GFR SERPL CREATININE-BSD FRML MDRD: 40 ML/MIN/1.73SQ M
GLUCOSE SERPL-MCNC: 169 MG/DL (ref 65–140)
GLUCOSE SERPL-MCNC: 176 MG/DL (ref 65–140)
GLUCOSE SERPL-MCNC: 191 MG/DL (ref 65–140)
GLUCOSE SERPL-MCNC: 197 MG/DL (ref 65–140)
GLUCOSE SERPL-MCNC: 222 MG/DL (ref 65–140)
GLUCOSE SERPL-MCNC: 260 MG/DL (ref 65–140)
GLUCOSE UR STRIP-MCNC: ABNORMAL MG/DL
HCT VFR BLD AUTO: 32.4 % (ref 36.5–49.3)
HGB BLD-MCNC: 11.3 G/DL (ref 12–17)
HGB UR QL STRIP.AUTO: ABNORMAL
KETONES UR STRIP-MCNC: NEGATIVE MG/DL
LEUKOCYTE ESTERASE UR QL STRIP: ABNORMAL
MCH RBC QN AUTO: 30.9 PG (ref 26.8–34.3)
MCHC RBC AUTO-ENTMCNC: 34.9 G/DL (ref 31.4–37.4)
MCV RBC AUTO: 89 FL (ref 82–98)
NITRITE UR QL STRIP: NEGATIVE
NON-SQ EPI CELLS URNS QL MICRO: ABNORMAL /HPF
PH UR STRIP.AUTO: 5.5 [PH]
PLATELET # BLD AUTO: 67 THOUSANDS/UL (ref 149–390)
PMV BLD AUTO: 12.1 FL (ref 8.9–12.7)
POTASSIUM SERPL-SCNC: 4.4 MMOL/L (ref 3.5–5.3)
PROT UR STRIP-MCNC: NEGATIVE MG/DL
RBC # BLD AUTO: 3.66 MILLION/UL (ref 3.88–5.62)
RBC #/AREA URNS AUTO: ABNORMAL /HPF
SODIUM SERPL-SCNC: 141 MMOL/L (ref 135–147)
SP GR UR STRIP.AUTO: 1.02 (ref 1–1.03)
UROBILINOGEN UR STRIP-ACNC: <2 MG/DL
WBC # BLD AUTO: 5.69 THOUSAND/UL (ref 4.31–10.16)
WBC #/AREA URNS AUTO: ABNORMAL /HPF

## 2025-04-30 PROCEDURE — 99233 SBSQ HOSP IP/OBS HIGH 50: CPT | Performed by: STUDENT IN AN ORGANIZED HEALTH CARE EDUCATION/TRAINING PROGRAM

## 2025-04-30 PROCEDURE — 97112 NEUROMUSCULAR REEDUCATION: CPT

## 2025-04-30 PROCEDURE — 82948 REAGENT STRIP/BLOOD GLUCOSE: CPT

## 2025-04-30 PROCEDURE — 74176 CT ABD & PELVIS W/O CONTRAST: CPT

## 2025-04-30 PROCEDURE — 97116 GAIT TRAINING THERAPY: CPT

## 2025-04-30 PROCEDURE — 99233 SBSQ HOSP IP/OBS HIGH 50: CPT | Performed by: INTERNAL MEDICINE

## 2025-04-30 PROCEDURE — 81001 URINALYSIS AUTO W/SCOPE: CPT | Performed by: INTERNAL MEDICINE

## 2025-04-30 PROCEDURE — 85027 COMPLETE CBC AUTOMATED: CPT | Performed by: PHYSICIAN ASSISTANT

## 2025-04-30 PROCEDURE — 80048 BASIC METABOLIC PNL TOTAL CA: CPT | Performed by: PHYSICIAN ASSISTANT

## 2025-04-30 PROCEDURE — 97530 THERAPEUTIC ACTIVITIES: CPT

## 2025-04-30 PROCEDURE — G0545 PR INHERENT VISIT TO INPT: HCPCS | Performed by: INTERNAL MEDICINE

## 2025-04-30 PROCEDURE — 99254 IP/OBS CNSLTJ NEW/EST MOD 60: CPT | Performed by: INTERNAL MEDICINE

## 2025-04-30 RX ORDER — WATER 10 ML/10ML
INJECTION INTRAMUSCULAR; INTRAVENOUS; SUBCUTANEOUS
Status: COMPLETED
Start: 2025-04-30 | End: 2025-04-30

## 2025-04-30 RX ORDER — TEMOZOLOMIDE 5 MG/1
15 CAPSULE ORAL DAILY
Qty: 15 CAPSULE | Refills: 5 | Status: SHIPPED | OUTPATIENT
Start: 2025-04-30 | End: 2025-05-02 | Stop reason: SDUPTHER

## 2025-04-30 RX ORDER — TEMOZOLOMIDE 180 MG/1
180 CAPSULE ORAL DAILY
Qty: 5 CAPSULE | Refills: 5 | Status: SHIPPED | OUTPATIENT
Start: 2025-04-30 | End: 2025-05-02 | Stop reason: SDUPTHER

## 2025-04-30 RX ORDER — QUETIAPINE FUMARATE 25 MG/1
12.5 TABLET, FILM COATED ORAL DAILY
Status: DISCONTINUED | OUTPATIENT
Start: 2025-04-30 | End: 2025-05-07 | Stop reason: HOSPADM

## 2025-04-30 RX ORDER — POLYETHYLENE GLYCOL 3350 17 G/17G
17 POWDER, FOR SOLUTION ORAL 2 TIMES DAILY
Status: DISCONTINUED | OUTPATIENT
Start: 2025-04-30 | End: 2025-05-01

## 2025-04-30 RX ORDER — DIPHENHYDRAMINE HYDROCHLORIDE 50 MG/ML
25 INJECTION, SOLUTION INTRAMUSCULAR; INTRAVENOUS ONCE
Status: CANCELLED
Start: 2025-05-10 | End: 2025-05-08

## 2025-04-30 RX ORDER — ACETAMINOPHEN 325 MG/1
650 TABLET ORAL ONCE
Status: CANCELLED
Start: 2025-05-10 | End: 2025-05-08

## 2025-04-30 RX ORDER — SODIUM CHLORIDE 9 MG/ML
20 INJECTION, SOLUTION INTRAVENOUS ONCE AS NEEDED
Status: CANCELLED | OUTPATIENT
Start: 2025-05-10

## 2025-04-30 RX ADMIN — WATER 2.1 ML: 1 INJECTION INTRAMUSCULAR; INTRAVENOUS; SUBCUTANEOUS at 21:30

## 2025-04-30 RX ADMIN — SODIUM BICARBONATE 100 ML/HR: 84 INJECTION INTRAVENOUS at 13:41

## 2025-04-30 RX ADMIN — OLANZAPINE 5 MG: 10 INJECTION, POWDER, LYOPHILIZED, FOR SOLUTION INTRAMUSCULAR at 21:29

## 2025-04-30 RX ADMIN — ALLOPURINOL 300 MG: 300 TABLET ORAL at 08:30

## 2025-04-30 RX ADMIN — INSULIN GLARGINE 10 UNITS: 100 INJECTION, SOLUTION SUBCUTANEOUS at 20:49

## 2025-04-30 RX ADMIN — INSULIN LISPRO 15 UNITS: 100 INJECTION, SOLUTION INTRAVENOUS; SUBCUTANEOUS at 20:52

## 2025-04-30 RX ADMIN — HEPARIN SODIUM 5000 UNITS: 5000 INJECTION INTRAVENOUS; SUBCUTANEOUS at 05:05

## 2025-04-30 RX ADMIN — CHLORHEXIDINE GLUCONATE 0.12% ORAL RINSE 15 ML: 1.2 LIQUID ORAL at 08:30

## 2025-04-30 RX ADMIN — INSULIN LISPRO 5 UNITS: 100 INJECTION, SOLUTION INTRAVENOUS; SUBCUTANEOUS at 08:31

## 2025-04-30 RX ADMIN — HEPARIN SODIUM 5000 UNITS: 5000 INJECTION INTRAVENOUS; SUBCUTANEOUS at 13:44

## 2025-04-30 RX ADMIN — Medication 6 MG: at 20:45

## 2025-04-30 RX ADMIN — CHLORHEXIDINE GLUCONATE 0.12% ORAL RINSE 15 ML: 1.2 LIQUID ORAL at 20:49

## 2025-04-30 RX ADMIN — DEXAMETHASONE SODIUM PHOSPHATE 4 MG: 4 INJECTION INTRA-ARTICULAR; INTRALESIONAL; INTRAMUSCULAR; INTRAVENOUS; SOFT TISSUE at 13:44

## 2025-04-30 RX ADMIN — HEPARIN SODIUM 5000 UNITS: 5000 INJECTION INTRAVENOUS; SUBCUTANEOUS at 20:48

## 2025-04-30 RX ADMIN — PANTOPRAZOLE SODIUM 40 MG: 40 TABLET, DELAYED RELEASE ORAL at 05:05

## 2025-04-30 RX ADMIN — INSULIN LISPRO 10 UNITS: 100 INJECTION, SOLUTION INTRAVENOUS; SUBCUTANEOUS at 12:04

## 2025-04-30 RX ADMIN — DEXAMETHASONE SODIUM PHOSPHATE 4 MG: 4 INJECTION INTRA-ARTICULAR; INTRALESIONAL; INTRAMUSCULAR; INTRAVENOUS; SOFT TISSUE at 20:48

## 2025-04-30 RX ADMIN — CEFTRIAXONE SODIUM 2000 MG: 10 INJECTION, POWDER, FOR SOLUTION INTRAVENOUS at 17:25

## 2025-04-30 RX ADMIN — QUETIAPINE FUMARATE 12.5 MG: 25 TABLET ORAL at 11:16

## 2025-04-30 RX ADMIN — DOCUSATE SODIUM 100 MG: 100 CAPSULE, LIQUID FILLED ORAL at 08:30

## 2025-04-30 RX ADMIN — INSULIN LISPRO 5 UNITS: 100 INJECTION, SOLUTION INTRAVENOUS; SUBCUTANEOUS at 17:27

## 2025-04-30 RX ADMIN — POLYETHYLENE GLYCOL 3350 17 G: 17 POWDER, FOR SOLUTION ORAL at 20:47

## 2025-04-30 RX ADMIN — DOCUSATE SODIUM 100 MG: 100 CAPSULE, LIQUID FILLED ORAL at 17:31

## 2025-04-30 RX ADMIN — ATORVASTATIN CALCIUM 20 MG: 20 TABLET, FILM COATED ORAL at 08:30

## 2025-04-30 RX ADMIN — BISACODYL 10 MG: 10 SUPPOSITORY RECTAL at 08:31

## 2025-04-30 RX ADMIN — SENNOSIDES 17.2 MG: 8.6 TABLET, FILM COATED ORAL at 20:48

## 2025-04-30 RX ADMIN — POLYETHYLENE GLYCOL 3350 17 G: 17 POWDER, FOR SOLUTION ORAL at 08:30

## 2025-04-30 NOTE — PLAN OF CARE
Problem: PAIN - ADULT  Goal: Verbalizes/displays adequate comfort level or baseline comfort level  Description: Interventions:- Encourage patient to monitor pain and request assistance- Assess pain using appropriate pain scale- Administer analgesics based on type and severity of pain and evaluate response- Implement non-pharmacological measures as appropriate and evaluate response- Notify physician/advanced practitioner if interventions unsuccessful or patient reports new pain  Outcome: Progressing     Problem: INFECTION - ADULT  Goal: Absence or prevention of progression during hospitalization  Description: INTERVENTIONS:- Assess and monitor for signs and symptoms of infection- Monitor lab/diagnostic results- Monitor all insertion sites, i.e. indwelling lines, tubes, and drains- [place] appropriate cooling/warming therapies per order- Administer medications as ordered- Instruct and encourage patient and family to use good hand hygiene technique- Identify and instruct in appropriate isolation precautions for identified infection/condition  Outcome: Progressing     Problem: DISCHARGE PLANNING  Goal: Discharge to home or other facility with appropriate resources  Description: INTERVENTIONS:- Identify barriers to discharge w/patient and caregiver- Arrange for needed discharge resources and transportation as appropriate- Identify discharge learning needs (meds, wound care, etc.)- Refer to Case Management Department for coordinating discharge planning if the patient needs post-hospital services based on physician/advanced practitioner order or complex needs related to functional status, cognitive ability, or social support system  Outcome: Progressing

## 2025-04-30 NOTE — NURSING NOTE
"This nurse heard pts name called from Virtual Monitor. Upon entering the room nurse could see that  Pt was sitting on edge of bed with PICC line detached and condom catheter off, When asked do you need anything, pt stated, \" he wants to get out of here\". Pt is on continuous monitoring for safety precautions. Pt was cleaned up and redirected, Explained the importance using the call bell when in need of anything. Virtual Monitor dc'd and 1:1 placed. Pt resting in bed call bell in reach.   "

## 2025-04-30 NOTE — PLAN OF CARE
Problem: PHYSICAL THERAPY ADULT  Goal: Performs mobility at highest level of function for planned discharge setting.  See evaluation for individualized goals.  Description: Treatment/Interventions: Functional transfer training, LE strengthening/ROM, Therapeutic exercise, Endurance training, Cognitive reorientation, Patient/family training, Bed mobility, Gait training, Elevations  Equipment Recommended: Walker       See flowsheet documentation for full assessment, interventions and recommendations.  Outcome: Progressing  Note: Prognosis: Good  Problem List: Decreased strength, Decreased endurance, Decreased mobility, Decreased coordination, Impaired balance, Decreased cognition, Impaired judgement, Decreased safety awareness  Assessment: There is notable improvement int he patient's ambulation and acitivity tolerance today. He was able to ambulate over multiple trials with no device and only two losses of balance. His balance is significantly improved with the rolling walker, and recommend continued use of the walker at this time. His gait improved throughout the session with increasing stride length and a more appropriate base of support. He did readily follow all one-step instructions throughout the session today, but he does occasionally needs to be redirected to task.  Barriers to Discharge: Inaccessible home environment, Decreased caregiver support     Rehab Resource Intensity Level, PT: I (Maximum Resource Intensity)    See flowsheet documentation for full assessment.

## 2025-04-30 NOTE — ASSESSMENT & PLAN NOTE
Diagnosed by LP, brain biopsy.  Complicated by obstructive hydrocephalus, status post EVD 4/13 through 4/19.  Status post Rituxan, high-dose methotrexate, and intrathecal cytarabine on 4/17.    Would hold on chemo until stable on antibiotic regimen, potentially 48 hours

## 2025-04-30 NOTE — PLAN OF CARE
Problem: PAIN - ADULT  Goal: Verbalizes/displays adequate comfort level or baseline comfort level  Description: Interventions:- Encourage patient to monitor pain and request assistance- Assess pain using appropriate pain scale- Administer analgesics based on type and severity of pain and evaluate response- Implement non-pharmacological measures as appropriate and evaluate response- Notify physician/advanced practitioner if interventions unsuccessful or patient reports new pain  Outcome: Progressing     Problem: NEUROSENSORY - ADULT  Goal: Achieves stable or improved neurological status  Description: INTERVENTIONS- Monitor and report changes in neurological status- Monitor vital signs such as temperature, blood pressure, glucose, and any other labs ordered - Initiate measures to prevent increased intracranial pressure- Monitor for seizure activity and implement precautions if appropriate    INTERVENTIONS- Monitor and report changes in neurological status- Monitor vital signs such as temperature, blood pressure, glucose, and any other labs ordered - Initiate measures to prevent increased intracranial pressure- Monitor for seizure activity and implement precautions if appropriate    Outcome: Progressing  Goal: Remains free of injury related to seizures activity  Description: INTERVENTIONS- Maintain airway, patient safety  and administer oxygen as ordered- Monitor patient for seizure activity, document and report duration and description of seizure to physician/advanced practitioner- If seizure occurs,  ensure patient safety during seizure- Reorient patient post seizure- Seizure pads on all 4 side rails- Instruct patient/family to notify RN of any seizure activity including if an aura is experienced- Instruct patient/family to call for assistance with activity based on nursing assessment- Administer anti-seizure medications if ordered  INTERVENTIONS- Maintain airway, patient safety  and administer oxygen as ordered-  Monitor patient for seizure activity, document and report duration and description of seizure to physician/advanced practitioner- If seizure occurs,  ensure patient safety during seizure- Reorient patient post seizure- Seizure pads on all 4 side rails- Instruct patient/family to notify RN of any seizure activity including if an aura is experienced- Instruct patient/family to call for assistance with activity based on nursing assessment- Administer anti-seizure medications if ordered  Outcome: Progressing  Goal: Achieves maximal functionality and self care  Description: INTERVENTIONS- Monitor swallowing and airway patency with patient fatigue and changes in neurological status- Encourage and assist patient to increase activity and self care. - Encourage visually impaired, hearing impaired and aphasic patients to use assistive/communication devices  INTERVENTIONS- Monitor swallowing and airway patency with patient fatigue and changes in neurological status- Encourage and assist patient to increase activity and self care. - Encourage visually impaired, hearing impaired and aphasic patients to use assistive/communication devices  Outcome: Progressing     Problem: SAFETY,RESTRAINT: NV/NON-SELF DESTRUCTIVE BEHAVIOR  Goal: Remains free of harm/injury (restraint for non violent/non self-detsructive behavior)  Description: INTERVENTIONS:- Instruct patient/family regarding restraint use - Assess and monitor physiologic and psychological status - Provide interventions and comfort measures to meet assessed patient needs - Identify and implement measures to help patient regain control- Assess readiness for release of restraint   Outcome: Progressing  Goal: Returns to optimal restraint-free functioning  Description: INTERVENTIONS:- Assess the patient's behavior and symptoms that indicate continued need for restraint- Identify and implement measures to help patient regain control- Assess readiness for release of restraint    Outcome: Progressing

## 2025-04-30 NOTE — PROGRESS NOTES
"Progress Note - Hospitalist   Name: Alexis Dennison 64 y.o. male I MRN: [id number]  Unit/Bed#: [facility] 904-01 I Date of Admission: 3/29/2025   Date of Service: 4/30/2025 I Hospital Day: 32    Assessment & Plan  Primary central nervous system (CNS) lymphoma  Patient with development of worsening confusion, recently seen at the [facility] ED on 3/24/2025 with CT head showing brain lesion   MRI of the brain 3/26/2025 concerning for \"multiple scattered areas of subependymoma nodular enhancement throughout ventricles with mild hydrocephalus left worse than right, scattered nodular areas of enhancement in left anterior inferior basal ganglia involving left anterior commissure, and smaller foci of nodular enhancement along anteromedial aspect of the left cerebral peduncle and left quirino.\"  With brain compression  (minimal left to right shift), mild hydrocephalus as evidenced by imaging  S/p LP on 3/31. 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  Lymphoma/leukemia panel nondiagnostic  CT head on 4/3 with interval increase in ventricular caliber concerning for worsening hydrocephalus  Repeat LP on 4/7 was again undiagnostic.   MRI of the brain from 4/11-\"Interval enlargement of the dominant left anterior basal ganglionic mass lesion with greater surrounding vasogenic edema and mass effect. 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  S/p EVD, self removed over 4/26 weekend  Completed keppra 500mg BID x 7 days for postoperative seizure ppx per neurosurgery  Patient started on chemotherapy while inpatient.  Oncology input reviewed, plan for every 2 weeks methotrexate (cycle 1 received on 4/18, cycle 2 planned for 5/1) and weekly rituximab for 8 weeks (received on 4/23)   Patient evaluated by [facility] who accepted patient to receive therapy while receiving weekly " oncologic treatments as above.  Awaiting medical stability  Encephalopathy  Multifactorial in the setting of brain mass, vasogenic edema, LETY, medication induced and hospital-acquired delirium  Patient with increased lethargy today.  Zyprexa 10 mg at bedtime discontinued and patient started on Seroquel 25 mg at bedtime with as needed Zyprexa.  Discussed with palliative care  Previously on dexamethasone that was tapered last week. Discussed with oncology and plan to restart dexamethasone 4 mg 3 times daily with gradual taper over the next weeks  Continue delirium precautions  Patient back to 1-1.  Off restraints since 4/27  Sepsis (HCC)  As evidenced on 4/29 morning with temperature and tachycardia.  Worsening kidney function.  Lactate within normal limits  S/p 1 L bolus, continue IV fluids  Elevated procalcitonin in the setting of recent rituximab  UA with innumerable bacteria and large leukocytes, more concerning for UTI in comparison to UA from 4/25  Suspect UTI versus GI pathology.  No respiratory symptoms or findings of skin infection at this time  Follow urine culture  Blood culture with 1 out of 2 with E. coli  Continue IV ceftriaxone pending final cultures  Gram-negative bacteremia  Blood cultures with 1 out of 2 with E. coli  Continue IV ceftriaxone  Suspect UTI versus GI  Abdomen x-ray on 4/29 with large colonic stool burden without obstruction  Monitor hemodynamics  Constipation  Abdomen x-ray on 4/29 with large colonic stool burden without obstruction  Increase MiraLAX to twice daily.  Continue senna and Colace  Ambulate patient as able  LETY (acute kidney injury) (HCC)  Lab Results   Component Value Date    CREATININE 2.04 (H) 04/29/2025    CREATININE 1.68 (H) 04/28/2025    CREATININE 1.60 (H) 04/27/2025     Baseline creatinine around 0.9, peak creatinine 2.7  Patient was evaluated by nephrology during this hospital stay and given improvement of creatinine with IV fluids signed off on 4/25  Creatinine  slightly higher today to 2.04 however patient more lethargic with decreased oral intake   Continue IV fluids and repeat BMP in the morning  Ambulatory dysfunction  Plan for acute rehab once medically stable  Fall precautions  Ambulate patient  Type 2 diabetes mellitus with hyperglycemia, without long-term current use of insulin (HCC)  Hemoglobin A1c 6.6% on 2/22/2025  PTA metformin 1000 mg daily, holding  On Lantus 10 units at bedtime with insulin sliding scale  Hypoglycemia protocol  Accu-Cheks reviewed and stable  Mixed hyperlipidemia  Continue statin  HTN, goal below 130/80  PTA losartan and hydrochlorothiazide, holding in the setting of soft BP   BP reviewed  Liver lesion  MRI obtained: No suspicious hepatic lesions.  There is a simple right hepatic lobe cyst.  Mild diffuse hepatic steatosis.  LFTs stable  Outpatient follow up advised  Thyroid nodule  Incidental findings on CT scan  Nonemergent thyroid ultrasound for follow-up in the outpatient setting  Pulmonary nodule  Imaging with 4 mm right lower lobe pulmonary nodule.  CT chest 3-month follow-up    VTE Pharmacologic Prophylaxis: VTE Score: 5 High Risk (Score >/= 5) - Pharmacological DVT Prophylaxis Ordered: heparin. Sequential Compression Devices Ordered.    Mobility:   Basic Mobility Inpatient Raw Score: 16  JH-HLM Goal: 5: Stand one or more mins  JH-HLM Achieved: 6: Walk 10 steps or more  JH-HLM Goal achieved. Continue to encourage appropriate mobility.    Patient Centered Rounds: I performed bedside rounds with nursing staff today.   Discussions with Specialists or Other Care Team Provider: , oncology    Education and Discussions with Family / Patient: Updated  (daughter) via phone.    Current Length of Stay: 32 day(s)  Current Patient Status: Inpatient   Certification Statement: The patient will continue to require additional inpatient hospital stay due to as above  Discharge Plan: Anticipate discharge in 24-48 hrs to rehab  facility.    Code Status: Level 1 - Full Code    Subjective   This morning patient self remove his PICC line and condom catheter.  On my evaluation he is sitting in the chair, has no complaints.  Tolerating oral intake with no nausea or vomiting.  Had 1 small BM.        Objective :  Temp:  [96.2 °F (35.7 °C)-98 °F (36.7 °C)] 96.2 °F (35.7 °C)  HR:  [56-83] 61  BP: ()/(54-73) 122/68  Resp:  [12-16] 16  SpO2:  [93 %-96 %] 96 %  O2 Device: None (Room air)    Body mass index is 24.67 kg/m².     Input and Output Summary (last 24 hours):     Intake/Output Summary (Last 24 hours) at 4/30/2025 1004  Last data filed at 4/30/2025 0741  Gross per 24 hour   Intake 405 ml   Output 1100 ml   Net -695 ml       Physical Exam  Vitals and nursing note reviewed.   Constitutional:       General: He is not in acute distress.     Appearance: He is well-developed.   HENT:      Head: Normocephalic and atraumatic.   Eyes:      Conjunctiva/sclera: Conjunctivae normal.   Cardiovascular:      Rate and Rhythm: Normal rate and regular rhythm.   Pulmonary:      Effort: No respiratory distress.      Breath sounds: Normal breath sounds. No wheezing or rhonchi.   Abdominal:      General: Bowel sounds are normal.      Palpations: Abdomen is soft.      Tenderness: There is no abdominal tenderness.   Musculoskeletal:         General: No swelling.      Cervical back: Neck supple.   Skin:     General: Skin is warm and dry.      Capillary Refill: Capillary refill takes less than 2 seconds.   Neurological:      Mental Status: He is alert.      Comments: Alert and oriented to person only, does not know the time and reports his 1999)  Aphasia   Psychiatric:         Mood and Affect: Mood normal.         Behavior: Behavior normal.         Lines/Drains:  Lines/Drains/Airways       Active Status       Name Placement date Placement time Site Days    External Urinary Catheter Medium 04/24/25  1000  -- 6                            Lab Results: I have reviewed  the following results:   Results from last 7 days   Lab Units 04/29/25  0348   WBC Thousand/uL 4.37   HEMOGLOBIN g/dL 13.3   HEMATOCRIT % 38.0   PLATELETS Thousands/uL 53*   SEGS PCT % 85*   LYMPHO PCT % 11*   MONO PCT % 3*   EOS PCT % 1     Results from last 7 days   Lab Units 04/29/25  0400   SODIUM mmol/L 143   POTASSIUM mmol/L 4.1   CHLORIDE mmol/L 103   CO2 mmol/L 28   BUN mg/dL 46*   CREATININE mg/dL 2.04*   ANION GAP mmol/L 12   CALCIUM mg/dL 9.6   ALBUMIN g/dL 3.5   TOTAL BILIRUBIN mg/dL 1.49*   ALK PHOS U/L 64   ALT U/L 62*   AST U/L 15   GLUCOSE RANDOM mg/dL 143*         Results from last 7 days   Lab Units 04/30/25  0744 04/30/25  0613 04/29/25  1954 04/29/25  1622 04/29/25  1115 04/29/25  0749 04/29/25  0612 04/29/25  0155 04/28/25  2124 04/28/25  1550 04/28/25  1111 04/28/25  0633   POC GLUCOSE mg/dl 169* 191* 194* 257* 166* 161* 144* 150* 115 161* 188* 120         Results from last 7 days   Lab Units 04/29/25  0406 04/29/25  0357   LACTIC ACID mmol/L  --  1.6   PROCALCITONIN ng/ml 3.93*  --        Recent Cultures (last 7 days):   Results from last 7 days   Lab Units 04/29/25  0341   BLOOD CULTURE  Escherichia coli*  No Growth at 24 hrs.   GRAM STAIN RESULT  Gram negative rods*       Imaging Results Review: No pertinent imaging studies reviewed.  Other Study Results Review: No additional pertinent studies reviewed.    Last 24 Hours Medication List:     Current Facility-Administered Medications:     acetaminophen (TYLENOL) tablet 650 mg, Q6H PRN    allopurinol (ZYLOPRIM) tablet 300 mg, Daily    alteplase (CATHFLO) injection 2 mg, Q1MIN PRN    alteplase (CATHFLO) injection 2 mg, Q1MIN PRN    aluminum-magnesium hydroxide-simethicone (MAALOX) oral suspension 30 mL, Q4H PRN    atorvastatin (LIPITOR) tablet 20 mg, Daily    bisacodyl (DULCOLAX) rectal suppository 10 mg, Daily    calcium carbonate (TUMS) chewable tablet 1,000 mg, Daily PRN    cefTRIAXone (ROCEPHIN) 1,000 mg in dextrose 5 % 50 mL IVPB, Q24H,  Last Rate: 1,000 mg (04/29/25 1813)    chlorhexidine (PERIDEX) 0.12 % oral rinse 15 mL, Q12H CAIT    dexamethasone (DECADRON) injection 4 mg, Q8H CAIT    docusate sodium (COLACE) capsule 100 mg, BID    heparin (porcine) subcutaneous injection 5,000 Units, Q8H CAIT    insulin glargine (LANTUS) subcutaneous injection 10 Units 0.1 mL, HS    insulin lispro (HumALOG/ADMELOG) 100 units/mL subcutaneous injection 5-25 Units, TID AC **AND** Fingerstick Glucose (POCT), TID AC    insulin lispro (HumALOG/ADMELOG) 100 units/mL subcutaneous injection 5-25 Units, HS    melatonin tablet 6 mg, HS    multi-electrolyte (Plasmalyte-A/Isolyte-S PH 7.4/Normosol-R) IV solution, Continuous, Last Rate: Stopped (04/30/25 0506)    OLANZapine (ZyPREXA) IM injection 5 mg, BID PRN    ondansetron (ZOFRAN) injection 4 mg, Q6H PRN    oxyCODONE (ROXICODONE) split tablet 2.5 mg, Q4H PRN **OR** oxyCODONE (ROXICODONE) IR tablet 5 mg, Q4H PRN    pantoprazole (PROTONIX) EC tablet 40 mg, Early Morning    polyethylene glycol (MIRALAX) packet 17 g, BID    QUEtiapine (SEROquel) tablet 25 mg, HS    senna (SENOKOT) tablet 17.2 mg, HS    sodium chloride 0.9 % infusion, Once PRN, Last Rate: Stopped (04/17/25 2051)    sodium chloride 0.9 % infusion, Once PRN    sodium chloride 0.9 % infusion, Once PRN    Administrative Statements   Today, Patient Was Seen By: Dionne Huang MD      **Please Note: This note may have been constructed using a voice recognition system.**

## 2025-04-30 NOTE — ASSESSMENT & PLAN NOTE
Likely multifactorial.  Fluctuating.    Follow creatinine closely and dose-adjust antibiotics as indicated  No dose adjustment indicated for ceftriaxone

## 2025-04-30 NOTE — ASSESSMENT & PLAN NOTE
As evidenced on 4/29 morning with temperature and tachycardia.  Worsening kidney function.  Lactate within normal limits  S/p 1 L bolus, continue IV fluids  Elevated procalcitonin in the setting of recent rituximab  UA with innumerable bacteria and large leukocytes, more concerning for UTI in comparison to UA from 4/25  Suspect UTI versus GI pathology.  No respiratory symptoms or findings of skin infection at this time  Follow urine culture  Blood culture with 1 out of 2 with E. coli  Continue IV ceftriaxone pending final cultures

## 2025-04-30 NOTE — ASSESSMENT & PLAN NOTE
Abdomen x-ray on 4/29 with large colonic stool burden without obstruction  Increase MiraLAX to twice daily.  Continue senna and Colace  Ambulate patient as able

## 2025-04-30 NOTE — ASSESSMENT & PLAN NOTE
Fever, HR.  Due to bacteremia.  No other appreciable source of infection.  ROS and exam otherwise benign..  Hemodynamically stable and nontoxic.    Continue antibiotics as below  Follow temperatures closely  Recheck CBC in AM  Supportive care as per the primary service

## 2025-04-30 NOTE — ASSESSMENT & PLAN NOTE
64 yoM with PMHx of DM2, NAFLD, and HT who presented with progressive encephalopathy found to have multiple scattered nodular cerebral and cerebellar enhancement who underwent two non-diagnostic LPs prompting stereotactic brain biopsy (4/14) which showed large B-cell lymphoma.  See oncology progress note on 4/21 for full diagnostic work up.  On 4/29, agitation led to infectious work up with showed UA+ and BCx with 2/2 E. Coli.    Plan: HOLDING FOR NOW DUE TO BACTEREMIA  High-dose methotrexate (8 g/m²) every 2 weeks (C1 received 4/18, C2 planned 5/1 but delayed for bacteremia), may consider regimen with every 4 week administration which would be more conducive to rehab  Will consult PICC team once bacteremia is treated  UA Q6H to assess alkalinization of urine (goal urine pH>7.0)  IVF: HCO3 100 mEq at 150 mL/hr  Methotrexate levels every 24 hours beginning 24 hours from the start of his infusion  Rituximab (375 mg/m²) weekly x8 weeks  We will re-consult Nephrology at the time of his next cycle given his LETY vs CKD, appreciate recs  Final path is pending from biopsy (4/14)

## 2025-04-30 NOTE — ASSESSMENT & PLAN NOTE
"Patient with development of worsening confusion, recently seen at the [facility] ED on 3/24/2025 with CT head showing brain lesion   MRI of the brain 3/26/2025 concerning for \"multiple scattered areas of subependymoma nodular enhancement throughout ventricles with mild hydrocephalus left worse than right, scattered nodular areas of enhancement in left anterior inferior basal ganglia involving left anterior commissure, and smaller foci of nodular enhancement along anteromedial aspect of the left cerebral peduncle and left quirino.\"  With brain compression  (minimal left to right shift), mild hydrocephalus as evidenced by imaging  S/p LP on 3/31. 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  Lymphoma/leukemia panel nondiagnostic  CT head on 4/3 with interval increase in ventricular caliber concerning for worsening hydrocephalus  Repeat LP on 4/7 was again undiagnostic.   MRI of the brain from 4/11-\"Interval enlargement of the dominant left anterior basal ganglionic mass lesion with greater surrounding vasogenic edema and mass effect. 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  S/p EVD, self removed over 4/26 weekend  Completed keppra 500mg BID x 7 days for postoperative seizure ppx per neurosurgery  Patient started on chemotherapy while inpatient.  Oncology input reviewed, plan for every 2 weeks methotrexate (cycle 1 received on 4/18, cycle 2 planned for 5/1) and weekly rituximab for 8 weeks (received on 4/23)   Patient evaluated by [facility] who accepted patient to receive therapy while receiving weekly oncologic treatments as above.  Awaiting medical stability  "

## 2025-04-30 NOTE — PROGRESS NOTES
Progress Note - Oncology-Medical   Name: Alexis Dennison 64 y.o. male I MRN: [id number]  [facility] I Date of Admission: 3/29/2025   Date of Service: 4/30/2025 I Hospital Day: 32    Assessment & Plan  Primary CNS lymphoma  64 yoM with PMHx of DM2, NAFLD, and HT who presented with progressive encephalopathy found to have multiple scattered nodular cerebral and cerebellar enhancement who underwent two non-diagnostic LPs prompting stereotactic brain biopsy (4/14) which showed large B-cell lymphoma.  See oncology progress note on 4/21 for full diagnostic work up.  On 4/29, agitation led to infectious work up with showed UA+ and BCx with 2/2 E. Coli.    Plan: HOLDING FOR NOW DUE TO BACTEREMIA  High-dose methotrexate (8 g/m²) every 2 weeks (C1 received 4/18, C2 planned 5/1 but delayed for bacteremia), may consider regimen with every 4 week administration which would be more conducive to rehab  Will consult PICC team once bacteremia is treated  UA Q6H to assess alkalinization of urine (goal urine pH>7.0)  IVF: HCO3 100 mEq at 150 mL/hr  Methotrexate levels every 24 hours beginning 24 hours from the start of his infusion  Rituximab (375 mg/m²) weekly x8 weeks  We will re-consult Nephrology at the time of his next cycle given his LETY vs CKD, appreciate recs  Final path is pending from biopsy (4/14)  Gram-negative bacteremia  Patient met sepsis criteria on 4/29 with accompanying confusion and agitation.  UA was positive, UCx and BCx were ordered, and he was started on empiric ceftriaxone.  BCx showed 2/2 E. coli.  We recommend consult to ID for evaluation, management, and eventual infectious clearance prior to restarting his treatment  Pulmonary nodule  CTA 3/29 with nonspecific 4 mm RLL pulm nodule, recommendation for 3 month follow up  Liver lesion  CTA 3/29 with nonspecific 12mm hypodense right hepatic lesion, recommended MRI abdomen  MRI 3/30 with no suspicious hepatic lesions, demonstrated simple right hepatic  "lobe cyst    Subjective   Overnight patient had confusion and agitation, and he pulled his PICC line.  He was placed on 1:1 and has had a appropriate behavior since.  UA showed innumerable but no bacteria, large leukocytes negative nitrates, and 10-20 RBCs.  Blood and urine cultures were drawn, and the patient was started on empiric ceftriaxone.  This morning the patient was seen ambulating in the hallways with assistance.  His speech was fluent but thought process is not clear.  He is AAOx1.  No pain.  Not septic appearing.    Objective :  Temp:  [96.2 °F (35.7 °C)-98 °F (36.7 °C)] 96.2 °F (35.7 °C)  HR:  [56-83] 61  BP: ()/(54-73) 122/68  Resp:  [14-16] 16  SpO2:  [93 %-96 %] 96 %  O2 Device: None (Room air)    Physical Exam  General: AAOx1, well-appearing, confused, no acute distress, 1:1  HEENT: well-healing surgical scars, no ventricular drain, PERRLA, moist mucosa  Respiratory: CTAB w/o wheezes, rales, or rhonchi, no increased work of breathing  Cardiovascular: regular rhythm, bradycardia, w/o murmurs, 2+ radial and pedal pulses, no b/l LE edema  Abdomen: soft, non-tender, non-distended, no hepatomegaly or splenomegaly  /Rectal: deferred  Musculoskeletal: moves all four extremities with normal strength and ROM  Integumentary: warm, dry, no rash or bruising  Neurological: confused, reduced coordination  Psychiatric: pleasant and cooperative with normal mood, affect, and cognition      Lab Results: I have reviewed the following results:CBC/BMP:   .     04/30/25  1144   WBC 5.69   HGB 11.3*   HCT 32.4*   PLT 67*    , PTT/INR:No new results in last 24 hours. , Blood Culture: No results found for: \"BLOODCX\", Urinalysis:   Lab Results   Component Value Date    COLORU Yellow 04/29/2025    CLARITYU Extra Turbid 04/29/2025    SPECGRAV 1.014 04/29/2025    PHUR 5.5 04/29/2025    LEUKOCYTESUR Large (A) 04/29/2025    NITRITE Negative 04/29/2025    GLUCOSEU 200 (1/5%) (A) 04/29/2025    KETONESU Negative 04/29/2025 " "   BILIRUBINUR Negative 04/29/2025    BLOODU Moderate (A) 04/29/2025   , Urine Culture: No results found for: \"URINECX\"  Lab Results   Component Value Date    K 4.1 04/29/2025     04/29/2025    CO2 28 04/29/2025    BUN 46 (H) 04/29/2025    CREATININE 2.04 (H) 04/29/2025    GLUF 131 (H) 02/22/2025    CALCIUM 9.6 04/29/2025    CORRECTEDCA 10.2 (H) 04/27/2025    AST 15 04/29/2025    ALT 62 (H) 04/29/2025    ALKPHOS 64 04/29/2025    EGFR 33 04/29/2025     Lab Results   Component Value Date    WBC 5.69 04/30/2025    HGB 11.3 (L) 04/30/2025    HCT 32.4 (L) 04/30/2025    MCV 89 04/30/2025    PLT 67 (L) 04/30/2025     Lab Results   Component Value Date    NEUTROABS 3.72 04/29/2025     Imaging Results Review: No pertinent imaging studies reviewed.  Other Study Results Review: No additional pertinent studies reviewed.    Administrative Statements   I have spent a total time of 45 minutes in caring for this patient on the day of the visit/encounter including Diagnostic results, Prognosis, Risks and benefits of tx options, Instructions for management, Patient and family education, Importance of tx compliance, Risk factor reductions, Impressions, Counseling / Coordination of care, Documenting in the medical record, Reviewing/placing orders in the medical record (including tests, medications, and/or procedures), Obtaining or reviewing history  , and Communicating with other healthcare professionals .    Additional recommendations to follow per attending, Dr. Lee.    Bryson Garcia DO, PGY4  Hematology/Oncology Fellow  "

## 2025-04-30 NOTE — ASSESSMENT & PLAN NOTE
Multifactorial in the setting of brain mass, vasogenic edema, LETY, medication induced and hospital-acquired delirium  Patient with increased lethargy today.  Zyprexa 10 mg at bedtime discontinued and patient started on Seroquel 25 mg at bedtime with as needed Zyprexa.  Discussed with palliative care  Previously on dexamethasone that was tapered last week. Discussed with oncology and plan to restart dexamethasone 4 mg 3 times daily with gradual taper over the next weeks  Continue delirium precautions  Patient back to 1-1.  Off restraints since 4/27

## 2025-04-30 NOTE — ASSESSMENT & PLAN NOTE
Consider UTI given pyuria, recent external catheter.  Urine culture obtained prior to antibiotics pending.  Consider GI translocation in setting of constipation.  Consider other occult GI source.  LFTs stable.  Consider PICC infection, patinet self-removed.  Low suspicion for CNS infection given stable albeit fluctuating mental status.    Continue ceftriaxone for now  Follow up formal ID/susceptibilities and tailor antibiotics as indicated  Follow up urine cultures  Check CT A/P

## 2025-04-30 NOTE — CONSULTS
Consultation - Infectious Disease   Name: Alexis Dennison 64 y.o. male I MRN: [id number]  Unit/Bed#: [facility] 904-01 I Date of Admission: 3/29/2025   Date of Service: 4/30/2025 I Hospital Day: 32   Inpatient consult to Infectious Diseases  Consult performed by: Lisa Singh MD  Consult ordered by: Dionne Huang MD        Physician Requesting Evaluation: Dionne Huang MD   Reason for Evaluation / Principal Problem: Bacteremia    Assessment & Plan  Sepsis ([facility])  Fever, HR.  Due to bacteremia.  No other appreciable source of infection.  ROS and exam otherwise benign..  Hemodynamically stable and nontoxic.    Continue antibiotics as below  Follow temperatures closely  Recheck CBC in AM  Supportive care as per the primary service  E coli bacteremia  Consider UTI given pyuria, recent external catheter.  Urine culture obtained prior to antibiotics pending.  Consider GI translocation in setting of constipation.  Consider other occult GI source.  LFTs stable.  Consider PICC infection, patinet self-removed.  Low suspicion for CNS infection given stable albeit fluctuating mental status.    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  Complicated by obstructive hydrocephalus, status post EVD 4/13 through 4/19.  Status post Rituxan, high-dose methotrexate, and intrathecal cytarabine on 4/17.    Would hold on chemo until stable on antibiotic regimen, potentially 48 hours  Type 2 diabetes mellitus with hyperglycemia, without long-term current use of insulin ([facility])  Lab Results   Component Value Date    HGBA1C 6.6 (H) 02/22/2025   Risk factor for infection  Constipation  Risk factor for bacteremia.  Encephalopathy  Multifactorial due to underlying CNS lymphoma, medications, sepsis and infection as above.  LETY (acute kidney injury) ([facility])  Likely multifactorial.   Fluctuating.    Follow creatinine closely and dose-adjust antibiotics as indicated  No dose adjustment indicated for ceftriaxone    I have discussed with Dr. Huang and Dr. Garcia the above plan to continue IV antibiotics and hold chemo for now. They agrees with the plan.    Antibiotics:  Ceftriaxone #2    History of Present Illness   Alexis Dennison is a 64 y.o. year old male who presented in late March with several months of progressive confusion.  Had a CT which showed a brain lesion and MRI which ultimately showed multifocal brain masses.  He underwent an LP with cytology suspicious for CNS lymphoma.  He subsequently developed worsening mental status and was found to have obstructive hydrocephalus requiring EVD placement 4/13.  He underwent a brain biopsy 4/14 he was placed on Decadron.  Pathology ultimately came back showing diffuse large B-cell lymphoma.  A PICC line was placed on 4/16 and he was started on Rituxan, high-dose methotrexate, and intrathecal cytarabine on 4/17.  His EVD was self removed on 4/19.  He has been noted to have intermittent agitation since admission.  Overnight 4/28 through 4/29 the patient was noted to have new fever.  He was also noted to be agitated and pulled out his PICC line.  He had blood cultures obtained which have come back growing E. coli.  He has been started on ceftriaxone.  We are asked to comment on further evaluation and management.    Patient is a limited historian so the history is obtained from the medical record.  Denies any pain.  A complete review of systems is negative other than that noted in the HPI.    Medical History Review: I have reviewed the patient's PMH, PSH, Social History, Family History, Meds, and Allergies     Objective :  Temp:  [96.2 °F (35.7 °C)-98 °F (36.7 °C)] 96.2 °F (35.7 °C)  HR:  [56-82] 61  BP: ()/(54-73) 122/68  Resp:  [14-16] 16  SpO2:  [93 %-96 %] 96 %  O2 Device: None (Room air)    General:  No acute distress  Psychiatric:  Awake  and alert  Pulmonary:  Normal respiratory excursion without accessory muscle use  Abdomen:  Soft, nontender  Extremities:  No edema  Skin:  No rashes      Lab Results: I have reviewed the following results:  Results from last 7 days   Lab Units 04/30/25  1144 04/29/25  0348 04/28/25  0721   WBC Thousand/uL 5.69 4.37 7.63   HEMOGLOBIN g/dL 11.3* 13.3 12.6   PLATELETS Thousands/uL 67* 53* 53*     Results from last 7 days   Lab Units 04/29/25  0400 04/28/25  0721 04/27/25  0524 04/26/25  0551   SODIUM mmol/L 143 141 142 144   POTASSIUM mmol/L 4.1 3.8 3.8 3.9   CHLORIDE mmol/L 103 105 106 107   CO2 mmol/L 28 30 30 29   BUN mg/dL 46* 43* 42* 45*   CREATININE mg/dL 2.04* 1.68* 1.60* 1.55*   EGFR ml/min/1.73sq m 33 41 44 46   CALCIUM mg/dL 9.6 9.5 9.5 9.4   AST U/L 15  --  23 39   ALT U/L 62*  --  83* 101*   ALK PHOS U/L 64  --  46 41   ALBUMIN g/dL 3.5  --  3.1* 3.3*     Results from last 7 days   Lab Units 04/29/25  0341   BLOOD CULTURE  Escherichia coli*  No Growth at 24 hrs.   GRAM STAIN RESULT  Gram negative rods*     Results from last 7 days   Lab Units 04/29/25  0406   PROCALCITONIN ng/ml 3.93*                   Imaging Results Review: I personally reviewed the following image studies in PACS and associated radiology reports: xray(s). My interpretation of the radiology images/reports is: abdomen with large stool burden.

## 2025-04-30 NOTE — ASSESSMENT & PLAN NOTE
Hemoglobin A1c 6.6% on 2/22/2025  PTA metformin 1000 mg daily, holding  On Lantus 10 units at bedtime with insulin sliding scale  Hypoglycemia protocol  Accu-Cheks reviewed and stable

## 2025-04-30 NOTE — PHYSICAL THERAPY NOTE
Physical Therapy Progress Note       04/30/25 0940   PT Last Visit   PT Visit Date 04/30/25   Note Type   Note Type Treatment   Pain Assessment   Pain Assessment Tool 0-10   Pain Score No Pain   Restrictions/Precautions   Other Precautions 1:1;Impulsive;Cognitive;Fall Risk   Subjective   Subjective The patient pleasantly confused, but he would enjoy going for a walk.   Bed Mobility   Supine to Sit 5  Supervision   Additional items Increased time required;Verbal cues   Sit to Supine 5  Supervision   Additional items Verbal cues   Transfers   Sit to Stand 4  Minimal assistance   Additional items Assist x 1;Verbal cues   Stand to Sit 5  Supervision   Additional items Increased time required;Verbal cues   Ambulation/Elevation   Gait pattern Excessively slow;Short stride;Inconsistent maria esther;Narrow IRMA   Gait Assistance 4  Minimal assist   Additional items Assist x 1;Verbal cues   Assistive Device None;Rolling walker   Distance 80 feet, 110 feet, 65 feet, 105 feet, 120 feet, 60 feet all with no device and min assist. Additionally 150 feet x 2, 100 feet with the rolling walker supervision.   Balance   Static Sitting Fair +   Dynamic Sitting Fair   Static Standing Fair -   Dynamic Standing Fair -   Ambulatory Poor +   Activity Tolerance   Activity Tolerance Patient tolerated treatment well   Assessment   Prognosis Good   Problem List Decreased strength;Decreased endurance;Decreased mobility;Decreased coordination;Impaired balance;Decreased cognition;Impaired judgement;Decreased safety awareness   Assessment There is notable improvement int he patient's ambulation and acitivity tolerance today. He was able to ambulate over multiple trials with no device and only two losses of balance. His balance is significantly improved with the rolling walker, and recommend continued use of the walker at this time. His gait improved throughout the session with increasing stride length and a more appropriate base of support. He did  readily follow all one-step instructions throughout the session today, but he does occasionally needs to be redirected to task.   Barriers to Discharge Inaccessible home environment;Decreased caregiver support   Goals   Patient Goals To get something to eat.   STG Expiration Date 05/09/25   PT Treatment Day 9   Plan   Treatment/Interventions Functional transfer training;LE strengthening/ROM;Therapeutic exercise;Endurance training;Cognitive reorientation;Patient/family training;Bed mobility;Gait training;Elevations   Progress Progressing toward goals   PT Frequency 3-5x/wk   Discharge Recommendation   Rehab Resource Intensity Level, PT I (Maximum Resource Intensity)   Equipment Recommended Walker   Walker Package Recommended Wheeled walker   AM-PAC Basic Mobility Inpatient   Turning in Flat Bed Without Bedrails 4   Lying on Back to Sitting on Edge of Flat Bed Without Bedrails 3   Moving Bed to Chair 3   Standing Up From Chair Using Arms 3   Walk in Room 3   Climb 3-5 Stairs With Railing 3   Basic Mobility Inpatient Raw Score 19   Basic Mobility Standardized Score 42.48   [facility] Highest Level Of Mobility   -HLM Goal 6: Walk 10 steps or more   -HLM Achieved 8: Walk 250 feet ot more         An AM-PAC Basic Mobility raw score less than 16 suggests the patient may benefit from discharge to post-acute rehab services.    Mohamud Jaimes, PTA

## 2025-04-30 NOTE — ASSESSMENT & PLAN NOTE
Blood cultures with 1 out of 2 with E. coli  Continue IV ceftriaxone  Suspect UTI versus GI  Abdomen x-ray on 4/29 with large colonic stool burden without obstruction  Monitor hemodynamics

## 2025-04-30 NOTE — PROGRESS NOTES
"Progress Note - Palliative Care   Name: Alexis Dennison 64 y.o. male I MRN: [id number]  [facility] I Date of Admission: 3/29/2025   Date of Service: 4/30/2025 I Hospital Day: 32     Assessment & Plan  Palliative care encounter  Palliative diagnosis: newly diagnosed large B cell lymphoma    Symptom management:  D/c scheduled olanzapine and replaced with seroquel 25mg HS --> Added seroquel 12.5mg at lunch time as well  Olanzapine remains available BID PRN  Goal to wean patient from restraints to enable therapy/discharge planning  Decadron re-started at 4mg TID due to increased confusion and edema  oxyIR 2.5-5mg PO Q4H PRN moderate-severe pain  Dilaudid 0.2mg IV Q2H PRN BT pain --> d/c in anticipation of discharge to [facility]  Acetaminophen 650mg Q6H PRN mild pain  Bowel regimen in place    Goals:  Level 1 code status  Disease focused care without limits placed  Patient initiated high-dose methotrexate + rituximab inpatient  Current focus on rehabilitation, hopeful for mid week discharge to [facility]    Decisional apparatus:  Patient does not have capacity on exam today.  If capacity is lost, patient's substitute decision maker would default to spouse and adult children by [place] Act 169.  ER contacts:  Elizabeth Chester  Daughter  [phone]  Guy Naldo \"Naldo\" Phillip  Spouse  [phone]    Patient also has a son, Drew, and another daughter, Marina, who has down syndrome. Elizabeth is the main contact but family makes decisions as unit.    Advance Directive/Living Will/POLST: none on file    Social support:  Patient support system: spouse Naldo, 3 adult children, extended family.  Spouse Naldo lives locally and daughter Elizabeth is from [place] but Naldo prefers that she is primary contact due to language barrier (Chinese)  4/22: Met with Naldo along with palliative SW (Shikha and Brynn) and  (Kaylee #[id number]).  Provided medical update, supportive listening, answered questions to her satisfaction  Normalized experience of " patient/family  Provided anticipatory guidance  Advocated for patient/family with interdisciplinary care team    Coordination of care:  Reviewed case with RN, primary team    Follow up  Palliative Care will continue to follow  Please reach out via [employer] secure chat if questions or concerns arise.    We appreciate the invitation to be involved in this patient's care.   Primary central nervous system (CNS) lymphoma  Admitted 3/29 with worsening confusion in the setting of recent ED visit with CTA head and neck 3/24 demonstrating multiple nonspecific hyperdense ependymal/subependymal nodules, largest located adjacent to the foramen of Monro (2 x 1.1 x 1 cm ) and f/u outpatient MRI 3/26 demonstrating multiple scattered foci of subependymal nodular enhancement throughout ventricles (left worse than right) with mild hydrocephalus (left worse than right), scattered nodular areas of enhancement in left anterior inferior basal ganglia involving left anterior commissure, and smaller foci of nodular enhancement along anteromedial aspect of left cerebral peduncle and left quirino.with concern for primary CNS malignancy  Per sister, both patient's parents with history of lymphoma  CTH on admission 3/29 stable subependymal nodules and left foramen of Monro region hyperdense lesion, dilation of left lateral ventricle and minimal left to right midline shift  4/3 imaging concerning for worsening hydrocephalus  2 non diagnostic lumbar punctures performed  MRI brain 4/11 with interval disease progression  4/13 developed worsening mentation and CTH demonstrated predominantly left sided obstructive hydrocephalus from lesion located adjacent to the foramen of Monro, now s/p left front EVD  4/14 underwent left frontal endoscopic biopsy and replacement of EVD  Preliminary pathology large B-cell lymphoma    Medical oncology following  Encephalopathy  In the setting of the above  On exam, he is confused but interactive. Oriented to name only,  "does not recall , disoriented to all else.  EVD dislodged due to  agitation while admitted to ICU but did not need to be replaced secondary to stable ventricle size  Off restraints, on 1:1.  Improves with increased activity, hopeful for discharge to acute rehab pending course  Pulmonary nodule  CTA 3/29 demonstrates non specific 4 mm right lower lobe pulmonary nodule  Recommended 3 mo f/u  Liver lesion  CTA 3/29 demonstrates non specific 12 mm hypodense lesion within right hepatic lobe, MRI recommended  MRI 3/30 demonstrates no suspicious hepatic lesions, simple right hepatic lobe cyst noted    LETY (acute kidney injury) ([facility])  Nephrology following. Likely secondary to methotrexate and hypotensive episode. Improved. Leucovorin for supra therapeutic methotrexate levels completed.  Sepsis ([facility])    Gram-negative bacteremia      Interval history:  Last night, Alexis pulled out his PICC. When asked why he stated \"he wants to get out of here\". Pt is on continuous monitoring for safety precautions. Virtual Monitor dc'd and 1:1 placed. Alexis has had progressive confusion and developing edema with steroid wean. Heme Onc decided to restart Decadron 4 mg at TID dosing, with plan to wean down steroids gradually. RN reported that Alexis becomes delirious rather than agitated.     Alexis was calm, appeared comfortable, and was flipping through a crossword book with 1:1 at bedside. He reported that his pain is under control. He acknowledged that he had a rough night last night but speech began to trail off during the interaction.     MEDICATIONS / ALLERGIES:     current meds:   Current Facility-Administered Medications:     acetaminophen (TYLENOL) tablet 650 mg, Q6H PRN    allopurinol (ZYLOPRIM) tablet 300 mg, Daily    alteplase (CATHFLO) injection 2 mg, Q1MIN PRN    alteplase (CATHFLO) injection 2 mg, Q1MIN PRN    aluminum-magnesium hydroxide-simethicone (MAALOX) oral suspension 30 mL, Q4H PRN    atorvastatin (LIPITOR) tablet 20 mg, " Daily    bisacodyl (DULCOLAX) rectal suppository 10 mg, Daily    calcium carbonate (TUMS) chewable tablet 1,000 mg, Daily PRN    cefTRIAXone (ROCEPHIN) 1,000 mg in dextrose 5 % 50 mL IVPB, Q24H, Last Rate: 1,000 mg (04/29/25 1813)    chlorhexidine (PERIDEX) 0.12 % oral rinse 15 mL, Q12H CAIT    dexamethasone (DECADRON) injection 4 mg, Q8H CAIT    docusate sodium (COLACE) capsule 100 mg, BID    heparin (porcine) subcutaneous injection 5,000 Units, Q8H CAIT    insulin glargine (LANTUS) subcutaneous injection 10 Units 0.1 mL, HS    insulin lispro (HumALOG/ADMELOG) 100 units/mL subcutaneous injection 5-25 Units, TID AC **AND** Fingerstick Glucose (POCT), TID AC    insulin lispro (HumALOG/ADMELOG) 100 units/mL subcutaneous injection 5-25 Units, HS    melatonin tablet 6 mg, HS    multi-electrolyte (Plasmalyte-A/Isolyte-S PH 7.4/Normosol-R) IV solution, Continuous, Last Rate: Stopped (04/30/25 0506)    OLANZapine (ZyPREXA) IM injection 5 mg, BID PRN    ondansetron (ZOFRAN) injection 4 mg, Q6H PRN    oxyCODONE (ROXICODONE) split tablet 2.5 mg, Q4H PRN **OR** oxyCODONE (ROXICODONE) IR tablet 5 mg, Q4H PRN    pantoprazole (PROTONIX) EC tablet 40 mg, Early Morning    polyethylene glycol (MIRALAX) packet 17 g, Daily    QUEtiapine (SEROquel) tablet 25 mg, HS    senna (SENOKOT) tablet 17.2 mg, HS    sodium chloride 0.9 % infusion, Once PRN, Last Rate: Stopped (04/17/25 2051)    sodium chloride 0.9 % infusion, Once PRN    sodium chloride 0.9 % infusion, Once PRN    No Known Allergies    OBJECTIVE:    Physical Exam  Physical Exam  Constitutional:       General: He is awake.      Appearance: He is ill-appearing.   Pulmonary:      Effort: Pulmonary effort is normal.   Neurological:      Mental Status: He is confused.   Psychiatric:         Behavior: Behavior is cooperative.      Comments: Speech sometimes unintelligible       Lab Results:   Results from last 7 days   Lab Units 04/29/25  0348 04/28/25  0721 04/27/25  0524 04/26/25  0551    WBC Thousand/uL 4.37 7.63 8.72 9.58   HEMOGLOBIN g/dL 13.3 12.6 12.0 12.1   HEMATOCRIT % 38.0 35.8* 33.9* 35.2*   PLATELETS Thousands/uL 53* 53* 48* 61*   SEGS PCT % 85*  --  77* 80*   MONO PCT % 3*  --  3* 3*   EOS PCT % 1  --  2 1     Results from last 7 days   Lab Units 04/29/25  0400 04/28/25  0721 04/27/25  0524 04/26/25  0551   POTASSIUM mmol/L 4.1 3.8 3.8 3.9   CHLORIDE mmol/L 103 105 106 107   CO2 mmol/L 28 30 30 29   BUN mg/dL 46* 43* 42* 45*   CREATININE mg/dL 2.04* 1.68* 1.60* 1.55*   CALCIUM mg/dL 9.6 9.5 9.5 9.4   ALK PHOS U/L 64  --  46 41   ALT U/L 62*  --  83* 101*   AST U/L 15  --  23 39       Imaging Studies: reviewed pertinent studies   EKG, Pathology, and Other Studies: reviewed pertinent studies    Counseling / Coordination of Care  Total floor / unit time spent today 15 minutes. Greater than 50% of total time was spent with the patient and / or family counseling and / or coordination of care. A description of the counseling / coordination of care: symptom assessment and management, medication review, medication adjustment, and chart review

## 2025-04-30 NOTE — ASSESSMENT & PLAN NOTE
Patient met sepsis criteria on 4/29 with accompanying confusion and agitation.  UA was positive, UCx and BCx were ordered, and he was started on empiric ceftriaxone.  BCx showed 2/2 E. coli.  We recommend consult to ID for evaluation, management, and eventual infectious clearance prior to restarting his treatment

## 2025-04-30 NOTE — ASSESSMENT & PLAN NOTE
"Palliative diagnosis: newly diagnosed large B cell lymphoma    Symptom management:  D/c scheduled olanzapine and replaced with seroquel 25mg HS --> Added seroquel 12.5mg at lunch time as well  Olanzapine remains available BID PRN  Goal to wean patient from restraints to enable therapy/discharge planning  Decadron re-started at 4mg TID due to increased confusion and edema  oxyIR 2.5-5mg PO Q4H PRN moderate-severe pain  Dilaudid 0.2mg IV Q2H PRN BT pain --> d/c in anticipation of discharge to [facility]  Acetaminophen 650mg Q6H PRN mild pain  Bowel regimen in place    Goals:  Level 1 code status  Disease focused care without limits placed  Patient initiated high-dose methotrexate + rituximab inpatient  Current focus on rehabilitation, hopeful for mid week discharge to [facility]    Decisional apparatus:  Patient does not have capacity on exam today.  If capacity is lost, patient's substitute decision maker would default to spouse and adult children by [place] Act 169.  ER contacts:  Elizabeth Chester  Daughter  [phone]  Guy Naldo \"Naldo\" Phillip  Spouse  [phone]    Patient also has a son, Drew, and another daughter, Marina, who has down syndrome. Elizabeth is the main contact but family makes decisions as unit.    Advance Directive/Living Will/POLST: none on file    Social support:  Patient support system: spouse Naldo, 3 adult children, extended family.  Spouse Naldo lives locally and daughter Elizabeth is from [place] but Naldo prefers that she is primary contact due to language barrier (Chinese)  4/22: Met with Naldo along with palliative SW (Shikha and Brynn) and  (Kaylee #[id number]).  Provided medical update, supportive listening, answered questions to her satisfaction  Normalized experience of patient/family  Provided anticipatory guidance  Advocated for patient/family with interdisciplinary care team    Coordination of care:  Reviewed case with RN, primary team    Follow up  Palliative Care will continue to follow  Please reach out via " [employer] secure chat if questions or concerns arise.    We appreciate the invitation to be involved in this patient's care.

## 2025-04-30 NOTE — QUICK NOTE
Tentative steroid plan as follow 4 BID to 2 BID, then from 2 BID to daily. Then from 2 to 1mg.  Changes to occur every 4 days.  Oncology managing

## 2025-05-01 ENCOUNTER — DOCUMENTATION (OUTPATIENT)
Age: 65
End: 2025-05-01

## 2025-05-01 PROBLEM — B96.20 E. COLI UTI: Status: ACTIVE | Noted: 2025-05-01

## 2025-05-01 PROBLEM — N39.0 E. COLI UTI: Status: ACTIVE | Noted: 2025-05-01

## 2025-05-01 LAB
ANION GAP SERPL CALCULATED.3IONS-SCNC: 6 MMOL/L (ref 4–13)
BACTERIA BLD CULT: ABNORMAL
BACTERIA UR CULT: ABNORMAL
BACTERIA UR QL AUTO: ABNORMAL /HPF
BILIRUB UR QL STRIP: NEGATIVE
BUN SERPL-MCNC: 42 MG/DL (ref 5–25)
CALCIUM SERPL-MCNC: 8.2 MG/DL (ref 8.4–10.2)
CHLORIDE SERPL-SCNC: 100 MMOL/L (ref 96–108)
CLARITY UR: CLEAR
CO2 SERPL-SCNC: 28 MMOL/L (ref 21–32)
COLOR UR: ABNORMAL
CREAT SERPL-MCNC: 1.47 MG/DL (ref 0.6–1.3)
E COLI DNA BLD POS QL NAA+NON-PROBE: DETECTED
ERYTHROCYTE [DISTWIDTH] IN BLOOD BY AUTOMATED COUNT: 11.7 % (ref 11.6–15.1)
GFR SERPL CREATININE-BSD FRML MDRD: 49 ML/MIN/1.73SQ M
GLUCOSE SERPL-MCNC: 150 MG/DL (ref 65–140)
GLUCOSE SERPL-MCNC: 189 MG/DL (ref 65–140)
GLUCOSE SERPL-MCNC: 203 MG/DL (ref 65–140)
GLUCOSE SERPL-MCNC: 205 MG/DL (ref 65–140)
GLUCOSE SERPL-MCNC: 238 MG/DL (ref 65–140)
GLUCOSE UR STRIP-MCNC: ABNORMAL MG/DL
GRAM STN SPEC: ABNORMAL
HCT VFR BLD AUTO: 29.6 % (ref 36.5–49.3)
HGB BLD-MCNC: 10.2 G/DL (ref 12–17)
HGB UR QL STRIP.AUTO: NEGATIVE
KETONES UR STRIP-MCNC: NEGATIVE MG/DL
LEUKOCYTE ESTERASE UR QL STRIP: NEGATIVE
MCH RBC QN AUTO: 30.7 PG (ref 26.8–34.3)
MCHC RBC AUTO-ENTMCNC: 34.5 G/DL (ref 31.4–37.4)
MCV RBC AUTO: 89 FL (ref 82–98)
NITRITE UR QL STRIP: NEGATIVE
NON-SQ EPI CELLS URNS QL MICRO: ABNORMAL /HPF
PH UR STRIP.AUTO: 7.5 [PH]
PLATELET # BLD AUTO: 77 THOUSANDS/UL (ref 149–390)
PMV BLD AUTO: 13.3 FL (ref 8.9–12.7)
POTASSIUM SERPL-SCNC: 5.3 MMOL/L (ref 3.5–5.3)
PROT UR STRIP-MCNC: NEGATIVE MG/DL
RBC # BLD AUTO: 3.32 MILLION/UL (ref 3.88–5.62)
RBC #/AREA URNS AUTO: ABNORMAL /HPF
SODIUM SERPL-SCNC: 134 MMOL/L (ref 135–147)
SP GR UR STRIP.AUTO: 1.01 (ref 1–1.03)
UROBILINOGEN UR STRIP-ACNC: 6 MG/DL
WBC # BLD AUTO: 4.62 THOUSAND/UL (ref 4.31–10.16)
WBC #/AREA URNS AUTO: ABNORMAL /HPF

## 2025-05-01 PROCEDURE — 82948 REAGENT STRIP/BLOOD GLUCOSE: CPT

## 2025-05-01 PROCEDURE — 99233 SBSQ HOSP IP/OBS HIGH 50: CPT | Performed by: STUDENT IN AN ORGANIZED HEALTH CARE EDUCATION/TRAINING PROGRAM

## 2025-05-01 PROCEDURE — 02HV33Z INSERTION OF INFUSION DEVICE INTO SUPERIOR VENA CAVA, PERCUTANEOUS APPROACH: ICD-10-PCS | Performed by: INTERNAL MEDICINE

## 2025-05-01 PROCEDURE — 81001 URINALYSIS AUTO W/SCOPE: CPT

## 2025-05-01 PROCEDURE — 97116 GAIT TRAINING THERAPY: CPT

## 2025-05-01 PROCEDURE — 97530 THERAPEUTIC ACTIVITIES: CPT

## 2025-05-01 PROCEDURE — 36569 INSJ PICC 5 YR+ W/O IMAGING: CPT

## 2025-05-01 PROCEDURE — G0545 PR INHERENT VISIT TO INPT: HCPCS | Performed by: INTERNAL MEDICINE

## 2025-05-01 PROCEDURE — 99233 SBSQ HOSP IP/OBS HIGH 50: CPT | Performed by: PHYSICIAN ASSISTANT

## 2025-05-01 PROCEDURE — 97112 NEUROMUSCULAR REEDUCATION: CPT

## 2025-05-01 PROCEDURE — 99232 SBSQ HOSP IP/OBS MODERATE 35: CPT | Performed by: INTERNAL MEDICINE

## 2025-05-01 PROCEDURE — 85027 COMPLETE CBC AUTOMATED: CPT | Performed by: PHYSICIAN ASSISTANT

## 2025-05-01 PROCEDURE — 80048 BASIC METABOLIC PNL TOTAL CA: CPT | Performed by: PHYSICIAN ASSISTANT

## 2025-05-01 RX ORDER — WATER 10 ML/10ML
INJECTION INTRAMUSCULAR; INTRAVENOUS; SUBCUTANEOUS
Status: COMPLETED
Start: 2025-05-01 | End: 2025-05-01

## 2025-05-01 RX ORDER — LACTULOSE 10 G/15ML
20 SOLUTION ORAL 2 TIMES DAILY
Status: DISCONTINUED | OUTPATIENT
Start: 2025-05-01 | End: 2025-05-01

## 2025-05-01 RX ORDER — QUETIAPINE FUMARATE 25 MG/1
50 TABLET, FILM COATED ORAL
Status: DISCONTINUED | OUTPATIENT
Start: 2025-05-01 | End: 2025-05-07 | Stop reason: HOSPADM

## 2025-05-01 RX ADMIN — HEPARIN SODIUM 5000 UNITS: 5000 INJECTION INTRAVENOUS; SUBCUTANEOUS at 06:13

## 2025-05-01 RX ADMIN — QUETIAPINE FUMARATE 12.5 MG: 25 TABLET ORAL at 12:59

## 2025-05-01 RX ADMIN — PANTOPRAZOLE SODIUM 40 MG: 40 TABLET, DELAYED RELEASE ORAL at 06:13

## 2025-05-01 RX ADMIN — POLYETHYLENE GLYCOL 3350 17 G: 17 POWDER, FOR SOLUTION ORAL at 09:11

## 2025-05-01 RX ADMIN — HEPARIN SODIUM 5000 UNITS: 5000 INJECTION INTRAVENOUS; SUBCUTANEOUS at 23:27

## 2025-05-01 RX ADMIN — SENNOSIDES 17.2 MG: 8.6 TABLET, FILM COATED ORAL at 23:25

## 2025-05-01 RX ADMIN — DEXAMETHASONE SODIUM PHOSPHATE 4 MG: 4 INJECTION INTRA-ARTICULAR; INTRALESIONAL; INTRAMUSCULAR; INTRAVENOUS; SOFT TISSUE at 23:27

## 2025-05-01 RX ADMIN — ATORVASTATIN CALCIUM 20 MG: 20 TABLET, FILM COATED ORAL at 09:11

## 2025-05-01 RX ADMIN — INSULIN LISPRO 5 UNITS: 100 INJECTION, SOLUTION INTRAVENOUS; SUBCUTANEOUS at 17:46

## 2025-05-01 RX ADMIN — INSULIN LISPRO 5 UNITS: 100 INJECTION, SOLUTION INTRAVENOUS; SUBCUTANEOUS at 12:59

## 2025-05-01 RX ADMIN — Medication 6 MG: at 23:25

## 2025-05-01 RX ADMIN — ALLOPURINOL 300 MG: 300 TABLET ORAL at 09:12

## 2025-05-01 RX ADMIN — CEFTRIAXONE SODIUM 2000 MG: 10 INJECTION, POWDER, FOR SOLUTION INTRAVENOUS at 18:02

## 2025-05-01 RX ADMIN — OLANZAPINE 5 MG: 10 INJECTION, POWDER, LYOPHILIZED, FOR SOLUTION INTRAMUSCULAR at 11:22

## 2025-05-01 RX ADMIN — SODIUM BICARBONATE 100 ML/HR: 84 INJECTION INTRAVENOUS at 15:31

## 2025-05-01 RX ADMIN — QUETIAPINE FUMARATE 50 MG: 25 TABLET ORAL at 23:25

## 2025-05-01 RX ADMIN — DOCUSATE SODIUM 100 MG: 100 CAPSULE, LIQUID FILLED ORAL at 09:11

## 2025-05-01 RX ADMIN — INSULIN LISPRO 10 UNITS: 100 INJECTION, SOLUTION INTRAVENOUS; SUBCUTANEOUS at 09:12

## 2025-05-01 RX ADMIN — HEPARIN SODIUM 5000 UNITS: 5000 INJECTION INTRAVENOUS; SUBCUTANEOUS at 15:30

## 2025-05-01 RX ADMIN — SODIUM BICARBONATE 100 ML/HR: 84 INJECTION INTRAVENOUS at 03:14

## 2025-05-01 RX ADMIN — BISACODYL 10 MG: 10 SUPPOSITORY RECTAL at 12:59

## 2025-05-01 RX ADMIN — DEXAMETHASONE SODIUM PHOSPHATE 4 MG: 4 INJECTION INTRA-ARTICULAR; INTRALESIONAL; INTRAMUSCULAR; INTRAVENOUS; SOFT TISSUE at 06:13

## 2025-05-01 RX ADMIN — INSULIN LISPRO 10 UNITS: 100 INJECTION, SOLUTION INTRAVENOUS; SUBCUTANEOUS at 23:28

## 2025-05-01 RX ADMIN — WATER: 1 INJECTION INTRAMUSCULAR; INTRAVENOUS; SUBCUTANEOUS at 15:37

## 2025-05-01 RX ADMIN — CHLORHEXIDINE GLUCONATE 0.12% ORAL RINSE 15 ML: 1.2 LIQUID ORAL at 23:27

## 2025-05-01 RX ADMIN — INSULIN GLARGINE 10 UNITS: 100 INJECTION, SOLUTION SUBCUTANEOUS at 23:28

## 2025-05-01 RX ADMIN — DEXAMETHASONE SODIUM PHOSPHATE 4 MG: 4 INJECTION INTRA-ARTICULAR; INTRALESIONAL; INTRAMUSCULAR; INTRAVENOUS; SOFT TISSUE at 15:30

## 2025-05-01 NOTE — ASSESSMENT & PLAN NOTE
As evidenced on 4/29 morning with temperature and tachycardia.  Worsening kidney function.  Lactate within normal limits  S/p 1 L bolus, continue IV fluids  Elevated procalcitonin in the setting of recent rituximab  UA with innumerable bacteria and large leukocytes  Suspect UTI versus GI pathology.  No respiratory symptoms or findings of skin infection at this time  Urine culture with E. coli  Blood culture with 1 out of 2 with E. coli  Continue IV ceftriaxone for now

## 2025-05-01 NOTE — ASSESSMENT & PLAN NOTE
Due to UTI as below.  No other appreciable source.  LFTs stable.  CT A/P unremarkable.  Consider PICC infection, patinet self-removed.  Low suspicion for CNS infection given stable albeit fluctuating mental status.    Continue ceftriaxone for now  Follow up formal susceptibilities and tailor antibiotics as indicated

## 2025-05-01 NOTE — ASSESSMENT & PLAN NOTE
Pyuria, recent external catheter.  No evidence for retention.  Unreliable historian in terms of symptoms.    Continue antibiotics as above  Follow UOP closely

## 2025-05-01 NOTE — PROGRESS NOTES
Received request from clinical for patient to start on Temozolomide. Auth has been submitted via cover my meds and this is pending and marked as urgent.    (Key: [id number])  PA Case ID #: [id number]    [id number]  PCN:[id number]  ID:[id number]  GRP:[id number]      Temozolomide 180 MG and 5 MG.    UPDATE:  This came back approved 05/02/2025-05/02/2026

## 2025-05-01 NOTE — ASSESSMENT & PLAN NOTE
Likely multifactorial.  Fluctuating.    Follow creatinine closely and dose-adjust antibiotics as indicated

## 2025-05-01 NOTE — ASSESSMENT & PLAN NOTE
Diagnosed by LP, brain biopsy.  Complicated by obstructive hydrocephalus, status post EVD 4/13 through 4/19.  Status post Rituxan, high-dose methotrexate, and intrathecal cytarabine on 4/17.    Given clinical improvement, would be low risk for chemo in next 24 hours

## 2025-05-01 NOTE — PROGRESS NOTES
"Progress Note - Palliative Care   Name: Alexis Dennison 64 y.o. male I MRN: [id number]  [facility] I Date of Admission: 3/29/2025   Date of Service: 5/1/2025 I Hospital Day: 33     Assessment & Plan  Palliative care encounter  Palliative diagnosis: newly diagnosed large B cell lymphoma    Symptom management:  HS seroquel to be increased to 50mg   Added seroquel 12.5mg at lunch time as well  Olanzapine remains available BID PRN  Goal to wean patient from restraints to enable therapy/discharge planning  Decadron re-started at 4mg TID due to increased confusion and edema, plan to wean dose every 4 days  oxyIR 2.5-5mg PO Q4H PRN moderate-severe pain  Acetaminophen 650mg Q6H PRN mild pain  Bowel regimen in place    Goals:  Level 1 code status  Disease focused care without limits placed  Patient initiated high-dose methotrexate + rituximab inpatient  Current focus on rehabilitation, hopeful for discharge to [facility] when medically stable    Decisional apparatus:  Patient does not have capacity on exam today.  If capacity is lost, patient's substitute decision maker would default to spouse and adult children by [place] Act 169.  ER contacts:  Elizabeth Chester  Daughter  [phone]  Guy Naldo \"Naldo\" Phillip  Spouse  [phone]    Patient also has a son, Drew, and another daughter, Marina, who has down syndrome. Elizabeth is the main contact but family makes decisions as unit.    Advance Directive/Living Will/POLST: none on file    Social support:  Patient support system: spouse Naldo, 3 adult children, extended family.  Spouse Naldo lives locally and daughter Elizabeth is from [place] but Naldo prefers that she is primary contact due to language barrier (Chinese)  4/22: Met with Naldo along with palliative SW (Shikha and Brynn) and  (Kaylee #[id number]).  Provided medical update, supportive listening, answered questions to her satisfaction  Normalized experience of patient/family  Provided anticipatory guidance  Advocated for " patient/family with interdisciplinary care team  Alexis is a , consider posey mat or fidget toys    Coordination of care:  Reviewed case with RN, primary team    Follow up  Palliative Care will continue to follow  Please reach out via [employer] secure chat if questions or concerns arise.    We appreciate the invitation to be involved in this patient's care.   Primary CNS lymphoma  Appreciate oncology input  methotrexate and rituximab started this admission  Oncology considering 4 week methotrexate cycle to allow more time for rehab/recovery before next dose  Weekly rituximab  Encephalopathy  In the setting of the above  On exam, he is confused but interactive. Oriented to name only, does not recall , disoriented to all else.  EVD dislodged due to  agitation while admitted to ICU but did not need to be replaced secondary to stable ventricle size  Off restraints, on 1:1.  Improves with increased activity, hopeful for discharge to acute rehab pending course  Pulmonary nodule  CTA 3/29 demonstrates non specific 4 mm right lower lobe pulmonary nodule  Recommended 3 mo f/u  Liver lesion  CTA 3/29 demonstrates non specific 12 mm hypodense lesion within right hepatic lobe, MRI recommended  MRI 3/30 demonstrates no suspicious hepatic lesions, simple right hepatic lobe cyst noted    LETY (acute kidney injury) (HCC)  Nephrology following. Likely secondary to methotrexate and hypotensive episode. Improved. Leucovorin for supra therapeutic methotrexate levels completed.  E coli bacteremia  ID following, PICC to be replaced today  E. coli UTI    Interval history:       Patient required a dose of olanzapine overnight. This morning he is awake, walked the unit x4, is enjoying his lunch during time of encounter, denies headache, remains confused but less so than prior days.     MEDICATIONS / ALLERGIES:     all current active meds have been reviewed    No Known Allergies    OBJECTIVE:    Physical Exam  Physical  Exam  HENT:      Head: Normocephalic and atraumatic.      Comments: EVD site CDI  Eyes:      Conjunctiva/sclera: Conjunctivae normal.   Cardiovascular:      Rate and Rhythm: Normal rate.   Pulmonary:      Effort: No respiratory distress.   Abdominal:      General: There is no distension.      Tenderness: There is no guarding.   Musculoskeletal:         General: No swelling.   Skin:     General: Skin is warm and dry.   Neurological:      Mental Status: He is alert.      Comments: Oriented to himself and his occupation but disoriented to all else   Psychiatric:      Comments: Calm, 1:1 at bedside         Lab Results:   Results from last 7 days   Lab Units 05/01/25  0612 04/30/25  1144 04/29/25  0348 04/28/25  0721 04/27/25  0524 04/26/25  0551   WBC Thousand/uL 4.62 5.69 4.37   < > 8.72 9.58   HEMOGLOBIN g/dL 10.2* 11.3* 13.3   < > 12.0 12.1   HEMATOCRIT % 29.6* 32.4* 38.0   < > 33.9* 35.2*   PLATELETS Thousands/uL 77* 67* 53*   < > 48* 61*   SEGS PCT %  --   --  85*  --  77* 80*   MONO PCT %  --   --  3*  --  3* 3*   EOS PCT %  --   --  1  --  2 1    < > = values in this interval not displayed.     Results from last 7 days   Lab Units 05/01/25  0612 04/30/25  1304 04/29/25  0400 04/28/25  0721 04/27/25  0524 04/26/25  0551   POTASSIUM mmol/L 5.3 4.4 4.1   < > 3.8 3.9   CHLORIDE mmol/L 100 102 103   < > 106 107   CO2 mmol/L 28 28 28   < > 30 29   BUN mg/dL 42* 49* 46*   < > 42* 45*   CREATININE mg/dL 1.47* 1.74* 2.04*   < > 1.60* 1.55*   CALCIUM mg/dL 8.2* 9.1 9.6   < > 9.5 9.4   ALK PHOS U/L  --   --  64  --  46 41   ALT U/L  --   --  62*  --  83* 101*   AST U/L  --   --  15  --  23 39    < > = values in this interval not displayed.       Imaging Studies: reviewed pertinent studies   EKG, Pathology, and Other Studies: reviewed pertinent studies    Counseling / Coordination of Care    Total floor / unit time spent today 20+ minutes. Greater than 50% of total time was spent with the patient and / or family counseling  and / or coordination of care. A description of the counseling / coordination of care: symptom assessment and management, medication review, medication adjustment, psychosocial support, chart review, lab review, supportive listening, anticipatory guidance, and coordination with RN, primary team, PCA

## 2025-05-01 NOTE — PROCEDURES
Insert Complex Venous Access Line    Date/Time: 5/1/2025 12:10 PM    Performed by: Sameera Hardin RN  Authorized by: Dionne Huang MD    Patient location:  Bedside  Other Assisting Provider: Yes (comment) (Shikha DICKERSON)    Consent:     Consent obtained:  Written and verbal    Consent given by:  Patient and guardian (daughter Elizabeth Chester)    Risks discussed:  Arterial puncture, bleeding, infection, incorrect placement, nerve damage and pneumothorax    Alternatives discussed:  No treatment, delayed treatment and alternative treatment  Universal protocol:     Procedure explained and questions answered to patient or proxy's satisfaction: yes      Immediately prior to procedure, a time out was called: yes      Site/side marked: yes      Patient identity confirmed:  Verbally with patient, arm band, provided demographic data and hospital-assigned identification number  Pre-procedure details:     Hand hygiene: Hand hygiene performed prior to insertion      Sterile barrier technique: All elements of maximal sterile technique followed      Skin preparation:  ChloraPrep    Skin preparation agent: Skin preparation agent completely dried prior to procedure    Procedure details:     Complex Venous Access Line Type: PICC      Complex Venous Access Line Indications: chemotherapy      Catheter tip vessel location: atriocaval junction      Orientation:  Left    Location:  Brachial    Procedural supplies:  Double lumen    Catheter size:  5 Fr ([id number] ex 9-30-25)    Total catheter length (cm):  44    Catheter out on skin (cm):  3    Max flow rate:  999    Arm circumference:  31    Patient evaluated for contraindications to access (i.e. fistula, thrombosis, etc): Yes      Site selection rationale:  Largest most available vein    Approach: percutaneous technique used      Patient position:  Flat    Ultrasound image availability:  Not saved    Sterile ultrasound techniques: Sterile gel and sterile probe covers were used       Number of attempts:  1    Successful placement: yes      Landmarks identified: yes      Vessel of catheter tip end:  Sherlock 3CG confirmed  Anesthesia (see MAR for exact dosages):     Anesthesia method:  Local infiltration    Local anesthetic:  Lidocaine 1% w/o epi  Post-procedure details:     Post-procedure:  Dressing applied and securement device placed    Assessment:  Blood return through all ports and free fluid flow    Post-procedure complications: none      Patient tolerance of procedure:  Tolerated well, no immediate complications      Ok to place PICC per ID Lisa Singh MD

## 2025-05-01 NOTE — ASSESSMENT & PLAN NOTE
Blood cultures with 1 out of 2 with E. Coli  CT abdomen pelvis on 4/30 with no acute pathology  Continue IV ceftriaxone for now per ID  Suspect secondary to UTI  Monitor hemodynamics  Patient cleared for PICC line

## 2025-05-01 NOTE — ASSESSMENT & PLAN NOTE
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  Lymphoma/leukemia panel nondiagnostic  CT head on 4/3 with interval increase in ventricular caliber concerning for worsening hydrocephalus. Repeat LP on 4/7 was again undiagnostic.   MRI of the brain from 4/11-\"Interval enlargement of the dominant left anterior basal ganglionic mass lesion with greater surrounding vasogenic edema and mass effect. 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  S/p EVD, self removed over 4/26 weekend  Completed keppra 500mg BID x 7 days for postoperative seizure ppx per neurosurgery  Patient started on chemotherapy while inpatient.  Oncology input reviewed, plan for every 2 weeks methotrexate (cycle 1 received on 4/18, cycle 2 planned for 5/2) and weekly rituximab for 8 weeks (received on 4/23)   Steroid taper restarted as previous was faster than recommended, oncology started dexamethasone 4 mg 3 times daily with plan to decrease to 4 mg twice daily, then 2 mg twice daily followed by 2 mg daily and then 1 mg daily.  Decrease dose every 4 days  Patient evaluated by [facility] who accepted patient to receive therapy while receiving weekly oncologic " treatments as above.  Awaiting medical stability

## 2025-05-01 NOTE — PROGRESS NOTES
Pastoral Care Progress Note     05/01/25 1500   Clinical Encounter Type   Referral From Nurse      check-ins with pt at nurse request, found pt asleep or with med staff.  will continue to follow and remains available.

## 2025-05-01 NOTE — ASSESSMENT & PLAN NOTE
Lab Results   Component Value Date    CREATININE 1.47 (H) 05/01/2025    CREATININE 1.74 (H) 04/30/2025    CREATININE 2.04 (H) 04/29/2025     Baseline creatinine around 0.9, peak creatinine 2.7  Patient was evaluated by nephrology during this hospital stay and given improvement of creatinine with IV fluids signed off on 4/25  Creatinine has remained stable  Continue to monitor.  Avoid nephrotoxins

## 2025-05-01 NOTE — ASSESSMENT & PLAN NOTE
Fever, HR.  Due to bacteremia, UTI.  No other appreciable source of infection.  ROS and exam otherwise benign..  Hemodynamically stable and nontoxic.    Continue antibiotics as below  Follow temperatures closely  Recheck CBC in AM  Supportive care as per the primary service

## 2025-05-01 NOTE — PROGRESS NOTES
Progress Note - Infectious Disease   Name: Alexis Dennison 64 y.o. male I MRN: [id number]  [facility] Date of Admission: 3/29/2025   Date of Service: 5/1/2025 I Hospital Day: 33    Assessment & Plan  Sepsis (HCC)  Fever, HR.  Due to bacteremia, UTI.  No other appreciable source of infection.  ROS and exam otherwise benign..  Hemodynamically stable and nontoxic.    Continue antibiotics as below  Follow temperatures closely  Recheck CBC in AM  Supportive care as per the primary service  E coli bacteremia  Due to UTI as below.  No other appreciable source.  LFTs stable.  CT A/P unremarkable.  Consider PICC infection, patinet self-removed.  Low suspicion for CNS infection given stable albeit fluctuating mental status.    Continue ceftriaxone for now  Follow up formal susceptibilities and tailor antibiotics as indicated  E. coli UTI  Pyuria, recent external catheter.  No evidence for retention.  Unreliable historian in terms of symptoms.    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  Complicated by obstructive hydrocephalus, status post EVD 4/13 through 4/19.  Status post Rituxan, high-dose methotrexate, and intrathecal cytarabine on 4/17.    Given clinical improvement, would be low risk for chemo in next 24 hours  Type 2 diabetes mellitus with hyperglycemia, without long-term current use of insulin ([facility])  Lab Results   Component Value Date    HGBA1C 6.6 (H) 02/22/2025   Risk factor for infection  Constipation  Risk factor for bacteremia.  Encephalopathy  Multifactorial due to underlying CNS lymphoma, medications, sepsis and infection as above.  LETY (acute kidney injury) ([facility])  Likely multifactorial.  Fluctuating.    Follow creatinine closely and dose-adjust antibiotics as indicated    I have discussed with Shira Huang and Jose the above plan to continue antibiotics and consider chemo tomorrow. They agrees with the  plan.    Antibiotics:  Ceftriaxone #3    Subjective   Patient awake and alert but confused, talking about tangential topics.  Remains on 1:1.  Denies pain    Objective :  Temp:  [96.6 °F (35.9 °C)-97.1 °F (36.2 °C)] 97.1 °F (36.2 °C)  HR:  [57-60] 60  BP: (108-123)/(63-70) 120/70  Resp:  [16-18] 16  SpO2:  [95 %-98 %] 95 %  O2 Device: None (Room air)    General:  No acute distress  Psychiatric:  Awake and alert  Pulmonary:  Normal respiratory excursion without accessory muscle use  Abdomen:  Soft, nontender  Extremities:  No edema  Skin:  No rashes      Lab Results: I have reviewed the following results:  Results from last 7 days   Lab Units 05/01/25  0612 04/30/25  1144 04/29/25  0348   WBC Thousand/uL 4.62 5.69 4.37   HEMOGLOBIN g/dL 10.2* 11.3* 13.3   PLATELETS Thousands/uL 77* 67* 53*     Results from last 7 days   Lab Units 05/01/25  0612 04/30/25  1304 04/29/25  0400 04/28/25  0721 04/27/25  0524 04/26/25  0551   SODIUM mmol/L 134* 141 143   < > 142 144   POTASSIUM mmol/L 5.3 4.4 4.1   < > 3.8 3.9   CHLORIDE mmol/L 100 102 103   < > 106 107   CO2 mmol/L 28 28 28   < > 30 29   BUN mg/dL 42* 49* 46*   < > 42* 45*   CREATININE mg/dL 1.47* 1.74* 2.04*   < > 1.60* 1.55*   EGFR ml/min/1.73sq m 49 40 33   < > 44 46   CALCIUM mg/dL 8.2* 9.1 9.6   < > 9.5 9.4   AST U/L  --   --  15  --  23 39   ALT U/L  --   --  62*  --  83* 101*   ALK PHOS U/L  --   --  64  --  46 41   ALBUMIN g/dL  --   --  3.5  --  3.1* 3.3*    < > = values in this interval not displayed.     Results from last 7 days   Lab Units 04/29/25  1448 04/29/25  0341   BLOOD CULTURE   --  No Growth at 48 hrs.  Escherichia coli*   GRAM STAIN RESULT   --  Gram negative rods*   URINE CULTURE  >100,000 cfu/ml Escherichia coli*  --      Results from last 7 days   Lab Units 04/29/25  0406   PROCALCITONIN ng/ml 3.93*                 Imaging Results Review: I personally reviewed the following image studies in PACS and associated radiology reports: CT  abdomen/pelvis. My interpretation of the radiology images/reports is: no urinary obstruction or intraabdominal abscess.

## 2025-05-01 NOTE — PHYSICAL THERAPY NOTE
Physical Therapy Progress Note       05/01/25 1120   PT Last Visit   PT Visit Date 05/01/25   Note Type   Note Type Treatment   Pain Assessment   Pain Assessment Tool 0-10   Pain Score No Pain   Restrictions/Precautions   Other Precautions 1:1;Impulsive;Cognitive;Fall Risk   Subjective   Subjective The patient restless and trying to get up. He was amenable to work with therapy.   Bed Mobility   Supine to Sit 5  Supervision   Additional items Verbal cues   Sit to Supine 5  Supervision   Additional items Increased time required;Verbal cues   Transfers   Sit to Stand 4  Minimal assistance   Additional items Assist x 1;Increased time required;Verbal cues   Stand to Sit 5  Supervision   Additional items Increased time required;Verbal cues   Ambulation/Elevation   Gait pattern Excessively slow;Short stride;Inconsistent maria esther;Narrow IRMA   Gait Assistance 4  Minimal assist   Additional items Assist x 1;Verbal cues   Assistive Device Other (Comment)  (Hand-hold assistance.)   Distance 100 feet, 60 feet x 2, 80 feet, 90 feet, 10 feet x 4.   Balance   Static Sitting Fair +   Dynamic Sitting Fair   Static Standing Fair -   Dynamic Standing Fair -   Ambulatory Poor +   Activity Tolerance   Activity Tolerance Patient tolerated treatment well   Assessment   Prognosis Good   Problem List Decreased strength;Decreased endurance;Decreased mobility;Decreased coordination;Impaired balance;Decreased cognition;Impaired judgement;Decreased safety awareness   Assessment The patient is notably more restless today, and he requires more assistance for redirection to task and situation. He was again ambulating with hand-hold assistance as he was impulsively standing. He was able to maneuver around obstacles with increased time and instruction in order to do so as otherwise he tends to walk into them. He is easily distracted today which is notably pronounced in a busy environment such as the hallway. He will benefit from continued therapies in  order to maximize his functional mobility and safety.   Barriers to Discharge Inaccessible home environment;Decreased caregiver support   Goals   Patient Goals Pt. making various statements due to his confusion.   STG Expiration Date 05/09/25   PT Treatment Day 10   Plan   Treatment/Interventions Functional transfer training;LE strengthening/ROM;Therapeutic exercise;Endurance training;Cognitive reorientation;Patient/family training;Bed mobility;Gait training;Elevations   Progress Progressing toward goals   PT Frequency 3-5x/wk   Discharge Recommendation   Rehab Resource Intensity Level, PT I (Maximum Resource Intensity)   Equipment Recommended Walker   Walker Package Recommended Wheeled walker   AM-PAC Basic Mobility Inpatient   Turning in Flat Bed Without Bedrails 4   Lying on Back to Sitting on Edge of Flat Bed Without Bedrails 3   Moving Bed to Chair 3   Standing Up From Chair Using Arms 3   Walk in Room 3   Climb 3-5 Stairs With Railing 2   Basic Mobility Inpatient Raw Score 18   Basic Mobility Standardized Score 41.05   [facility] Highest Level Of Mobility   -HLM Goal 6: Walk 10 steps or more   JH-HLM Achieved 8: Walk 250 feet ot more         An AM-PAC Basic Mobility raw score less than 16 suggests the patient may benefit from discharge to post-acute rehab services.    Mohamud Jaimes, PTA

## 2025-05-01 NOTE — ASSESSMENT & PLAN NOTE
Multifactorial in the setting of brain mass, vasogenic edema, LETY, medication induced and hospital-acquired delirium  Continue Seroquel per palliative  Currently on dexamethasone per oncology will will plan to wean starting on 5/2  Continue delirium precautions  Patient back to 1:1.  Off restraints since 4/27

## 2025-05-01 NOTE — PLAN OF CARE
Problem: PAIN - ADULT  Goal: Verbalizes/displays adequate comfort level or baseline comfort level  Description: Interventions:- Encourage patient to monitor pain and request assistance- Assess pain using appropriate pain scale- Administer analgesics based on type and severity of pain and evaluate response- Implement non-pharmacological measures as appropriate and evaluate response- Notify physician/advanced practitioner if interventions unsuccessful or patient reports new pain  Outcome: Progressing     Problem: INFECTION - ADULT  Goal: Absence or prevention of progression during hospitalization  Description: INTERVENTIONS:- Assess and monitor for signs and symptoms of infection- Monitor lab/diagnostic results- Monitor all insertion sites, i.e. indwelling lines, tubes, and drains- [place] appropriate cooling/warming therapies per order- Administer medications as ordered- Instruct and encourage patient and family to use good hand hygiene technique- Identify and instruct in appropriate isolation precautions for identified infection/condition  Outcome: Progressing     Problem: SAFETY ADULT  Goal: Patient will remain free of falls  Description: INTERVENTIONS:- Educate patient/family on patient safety including physical limitations- Instruct patient to call for assistance with activity - Consult OT/PT to assist with strengthening/mobility - Keep Call bell within reach- Keep bed low and locked with side rails adjusted as appropriate- Keep care items and personal belongings within reach- Initiate and maintain comfort rounds- Make Fall Risk Sign visible to staff- Offer Toileting every 2 Hours, in advance of need- Initiate/Maintain bed/chair alarm- Obtain necessary fall risk management equipment.- Apply yellow socks and bracelet for high fall risk patients- Consider moving patient to room near nurses station  INTERVENTIONS:- Educate patient/family on patient safety including physical limitations- Instruct patient to call  for assistance with activity - Consult OT/PT to assist with strengthening/mobility - Keep Call bell within reach- Keep bed low and locked with side rails adjusted as appropriate- Keep care items and personal belongings within reach- Initiate and maintain comfort rounds- Make Fall Risk Sign visible to staff- Offer Toileting every 2 Hours, in advance of need- Initiate/Maintain bed alarm- Obtain necessary fall risk management equipment: bracelet, socks, alarms- Apply yellow socks and bracelet for high fall risk patients- Consider moving patient to room near nurses station  Outcome: Progressing  Goal: Maintain or return to baseline ADL function  Description: INTERVENTIONS:-  Assess patient's ability to carry out ADLs; assess patient's baseline for ADL function and identify physical deficits which impact ability to perform ADLs (bathing, care of mouth/teeth, toileting, grooming, dressing, etc.)- Assess/evaluate cause of self-care deficits - Assess range of motion- Assess patient's mobility; develop plan if impaired- Assess patient's need for assistive devices and provide as appropriate- Encourage maximum independence but intervene and supervise when necessary- Involve family in performance of ADLs- Assess for home care needs following discharge - Consider OT consult to assist with ADL evaluation and planning for discharge- Provide patient education as appropriate  Outcome: Progressing  Goal: Maintains/Returns to pre admission functional level  Description: INTERVENTIONS:- Perform AM-PAC 6 Click Basic Mobility/ Daily Activity assessment daily.- Set and communicate daily mobility goal to care team and patient/family/caregiver. - Collaborate with rehabilitation services on mobility goals if consulted- Reposition patient every 2 hours.- Dangle patient 3 times a day- Stand patient 3 times a day- Ambulate patient 3 times a day- Out of bed to chair 3 times a day - Out of bed for meals 3 times a day- Out of bed for toileting-  Record patient progress and toleration of activity level   Outcome: Progressing     Problem: DISCHARGE PLANNING  Goal: Discharge to home or other facility with appropriate resources  Description: INTERVENTIONS:- Identify barriers to discharge w/patient and caregiver- Arrange for needed discharge resources and transportation as appropriate- Identify discharge learning needs (meds, wound care, etc.)- Refer to Case Management Department for coordinating discharge planning if the patient needs post-hospital services based on physician/advanced practitioner order or complex needs related to functional status, cognitive ability, or social support system  Outcome: Progressing     Problem: Knowledge Deficit  Goal: Patient/family/caregiver demonstrates understanding of disease process, treatment plan, medications, and discharge instructions  Description: Complete learning assessment and assess knowledge base.Interventions:- Provide teaching at level of understanding- Provide teaching via preferred learning methods  Outcome: Progressing     Problem: NEUROSENSORY - ADULT  Goal: Achieves stable or improved neurological status  Description: INTERVENTIONS- Monitor and report changes in neurological status- Monitor vital signs such as temperature, blood pressure, glucose, and any other labs ordered - Initiate measures to prevent increased intracranial pressure- Monitor for seizure activity and implement precautions if appropriate    INTERVENTIONS- Monitor and report changes in neurological status- Monitor vital signs such as temperature, blood pressure, glucose, and any other labs ordered - Initiate measures to prevent increased intracranial pressure- Monitor for seizure activity and implement precautions if appropriate    Outcome: Progressing  Goal: Remains free of injury related to seizures activity  Description: INTERVENTIONS- Maintain airway, patient safety  and administer oxygen as ordered- Monitor patient for seizure  activity, document and report duration and description of seizure to physician/advanced practitioner- If seizure occurs,  ensure patient safety during seizure- Reorient patient post seizure- Seizure pads on all 4 side rails- Instruct patient/family to notify RN of any seizure activity including if an aura is experienced- Instruct patient/family to call for assistance with activity based on nursing assessment- Administer anti-seizure medications if ordered  INTERVENTIONS- Maintain airway, patient safety  and administer oxygen as ordered- Monitor patient for seizure activity, document and report duration and description of seizure to physician/advanced practitioner- If seizure occurs,  ensure patient safety during seizure- Reorient patient post seizure- Seizure pads on all 4 side rails- Instruct patient/family to notify RN of any seizure activity including if an aura is experienced- Instruct patient/family to call for assistance with activity based on nursing assessment- Administer anti-seizure medications if ordered  Outcome: Progressing  Goal: Achieves maximal functionality and self care  Description: INTERVENTIONS- Monitor swallowing and airway patency with patient fatigue and changes in neurological status- Encourage and assist patient to increase activity and self care. - Encourage visually impaired, hearing impaired and aphasic patients to use assistive/communication devices  INTERVENTIONS- Monitor swallowing and airway patency with patient fatigue and changes in neurological status- Encourage and assist patient to increase activity and self care. - Encourage visually impaired, hearing impaired and aphasic patients to use assistive/communication devices  Outcome: Progressing     Problem: METABOLIC, FLUID AND ELECTROLYTES - ADULT  Goal: Glucose maintained within target range  Description: INTERVENTIONS:- Monitor Blood Glucose as ordered- Assess for signs and symptoms of hyperglycemia and hypoglycemia- Administer  ordered medications to maintain glucose within target range- Assess nutritional intake and initiate nutrition service referral as needed  INTERVENTIONS:- Monitor Blood Glucose as ordered- Assess for signs and symptoms of hyperglycemia and hypoglycemia- Administer ordered medications to maintain glucose within target range- Assess nutritional intake and initiate nutrition service referral as needed  Outcome: Progressing  Goal: Electrolytes maintained within normal limits  Description: INTERVENTIONS:- Monitor labs and assess patient for signs and symptoms of electrolyte imbalances- Administer electrolyte replacement as ordered- Monitor response to electrolyte replacements, including repeat lab results as appropriate- Instruct patient on fluid and nutrition as appropriate  Outcome: Progressing  Goal: Fluid balance maintained  Description: INTERVENTIONS:- Monitor labs - Monitor I/O and WT- Instruct patient on fluid and nutrition as appropriate- Assess for signs & symptoms of volume excess or deficit  Outcome: Progressing     Problem: Prexisting or High Potential for Compromised Skin Integrity  Goal: Skin integrity is maintained or improved  Description: INTERVENTIONS:- Identify patients at risk for skin breakdown- Assess and monitor skin integrity- Assess and monitor nutrition and hydration status- Monitor labs - Assess for incontinence - Turn and reposition patient- Assist with mobility/ambulation- Relieve pressure over bony prominences- Avoid friction and shearing- Provide appropriate hygiene as needed including keeping skin clean and dry- Evaluate need for skin moisturizer/barrier cream- Collaborate with interdisciplinary team - Patient/family teaching- Consider wound care consult   Outcome: Progressing     Problem: SAFETY,RESTRAINT: NV/NON-SELF DESTRUCTIVE BEHAVIOR  Goal: Remains free of harm/injury (restraint for non violent/non self-detsructive behavior)  Description: INTERVENTIONS:- Instruct patient/family  regarding restraint use - Assess and monitor physiologic and psychological status - Provide interventions and comfort measures to meet assessed patient needs - Identify and implement measures to help patient regain control- Assess readiness for release of restraint   Outcome: Progressing  Goal: Returns to optimal restraint-free functioning  Description: INTERVENTIONS:- Assess the patient's behavior and symptoms that indicate continued need for restraint- Identify and implement measures to help patient regain control- Assess readiness for release of restraint   Outcome: Progressing     Problem: CARDIOVASCULAR - ADULT  Goal: Maintains optimal cardiac output and hemodynamic stability  Description: INTERVENTIONS:- Monitor I/O, vital signs and rhythm- Monitor for S/S and trends of decreased cardiac output- Administer and titrate ordered vasoactive medications to optimize hemodynamic stability- Assess quality of pulses, skin color and temperature- Assess for signs of decreased coronary artery perfusion- Instruct patient to report change in severity of symptoms  Outcome: Progressing  Goal: Absence of cardiac dysrhythmias or at baseline rhythm  Description: INTERVENTIONS:- Continuous cardiac monitoring, vital signs, obtain 12 lead EKG if ordered- Administer antiarrhythmic and heart rate control medications as ordered- Monitor electrolytes and administer replacement therapy as ordered  Outcome: Progressing     Problem: GASTROINTESTINAL - ADULT  Goal: Maintains or returns to baseline bowel function  Description: INTERVENTIONS:- Assess bowel function- Encourage oral fluids to ensure adequate hydration- Administer IV fluids if ordered to ensure adequate hydration- Administer ordered medications as needed- Encourage mobilization and activity- Consider nutritional services referral to assist patient with adequate nutrition and appropriate food choices  Outcome: Progressing  Goal: Maintains adequate nutritional  intake  Description: INTERVENTIONS:- Monitor percentage of each meal consumed- Identify factors contributing to decreased intake, treat as appropriate- Assist with meals as needed- Monitor I&O, weight, and lab values if indicated- Obtain nutrition services referral as needed  Outcome: Progressing     Problem: GENITOURINARY - ADULT  Goal: Maintains or returns to baseline urinary function  Description: INTERVENTIONS:- Assess urinary function- Encourage oral fluids to ensure adequate hydration if ordered- Administer IV fluids as ordered to ensure adequate hydration- Administer ordered medications as needed- Offer frequent toileting- Follow urinary retention protocol if ordered  Outcome: Progressing  Goal: Absence of urinary retention  Description: INTERVENTIONS:- Assess patient’s ability to void and empty bladder- Monitor I/O- Bladder scan as needed- Discuss with physician/AP medications to alleviate retention as needed- Discuss catheterization for long term situations as appropriate  Outcome: Progressing     Problem: SKIN/TISSUE INTEGRITY - ADULT  Goal: Skin Integrity remains intact(Skin Breakdown Prevention)  Description: Assess:-Perform [place] assessment every shift-Clean and moisturize skin every 2-Inspect skin when repositioning, toileting, and assisting with ADLS-Assess under medical devices such as restraints every 2-Assess extremities for adequate circulation and sensation Bed Management:-Have minimal linens on bed & keep smooth, unwrinkled-Change linens as needed when moist or perspiring-Avoid sitting or lying in one position for more than 2 hours while in bed-Keep HOB at 30degrees Toileting:-Offer bedside commode-Assess for incontinence every 2-Use incontinent care products after each incontinent episode such as proper cleaning equipment Activity:-Mobilize patient 3 times a day-Encourage activity and walks on unit-Encourage or provide ROM exercises -Turn and reposition patient every 2 Hours-Use appropriate  equipment to lift or move patient in bed-Instruct/ Assist with weight shifting every 2 hours  when out of bed in chair-Consider limitation of chair time 2 hour intervalsSkin Care:-Avoid use of baby powder, tape, friction and shearing, hot water or constrictive clothing-Relieve pressure over bony prominences using 2-Do not massage red bony areasNext Steps:-Teach patient strategies to minimize risks such as pressure injuries -  Outcome: Progressing  Goal: Incision(s), wounds(s) or drain site(s) healing without S/S of infection  Description: INTERVENTIONS- Assess and document dressing, incision, wound bed, drain sites and surrounding tissue- Provide patient and family education- Perform skin care/dressing changes as needed  Outcome: Progressing     Problem: HEMATOLOGIC - ADULT  Goal: Maintains hematologic stability  Description: INTERVENTIONS- Assess for signs and symptoms of bleeding or hemorrhage- Monitor labs- Administer supportive blood products/factors as ordered and appropriate  Outcome: Progressing     Problem: MUSCULOSKELETAL - ADULT  Goal: Maintain or return mobility to safest level of function  Description: INTERVENTIONS:- Assess patient's ability to carry out ADLs; assess patient's baseline for ADL function and identify physical deficits which impact ability to perform ADLs (bathing, care of mouth/teeth, toileting, grooming, dressing, etc.)- Assess/evaluate cause of self-care deficits - Assess range of motion- Assess patient's mobility- Assess patient's need for assistive devices and provide as appropriate- Encourage maximum independence but intervene and supervise when necessary- Involve family in performance of ADLs- Assess for home care needs following discharge - Consider OT consult to assist with ADL evaluation and planning for discharge- Provide patient education as appropriate  Outcome: Progressing     Problem: Nutrition/Hydration-ADULT  Goal: Nutrient/Hydration intake appropriate for improving,  restoring or maintaining nutritional needs  Description: Monitor and assess patient's nutrition/hydration status for malnutrition. Collaborate with interdisciplinary team and initiate plan and interventions as ordered.  Monitor patient's weight and dietary intake as ordered or per policy. Utilize nutrition screening tool and intervene as necessary. Determine patient's food preferences and provide high-protein, high-caloric foods as appropriate. INTERVENTIONS:- Monitor oral intake, urinary output, labs, and treatment plans- Assess nutrition and hydration status and recommend course of action- Evaluate amount of meals eaten- Assist patient with eating if necessary - Allow adequate time for meals- Recommend/ encourage appropriate diets, oral nutritional supplements, and vitamin/mineral supplements- Order, calculate, and assess calorie counts as needed- Recommend, monitor, and adjust tube feedings and TPN/PPN based on assessed needs- Assess need for intravenous fluids- Provide specific nutrition/hydration education as appropriate- Include patient/family/caregiver in decisions related to nutrition  Outcome: Progressing     Problem: COPING  Goal: Pt/Family able to verbalize concerns and demonstrate effective coping strategies  Description: INTERVENTIONS:- Assist patient/family to identify coping skills, available support systems and cultural and spiritual values- Provide emotional support, including active listening and acknowledgement of concerns of patient and caregivers- Reduce environmental stimuli, as able- Provide patient education- Assess for spiritual pain/suffering and initiate spiritual care, including notification of Pastoral Care or lucie based community as needed- Assess effectiveness of coping strategies  Outcome: Progressing  Goal: Will report anxiety at manageable levels  Description: INTERVENTIONS:- Administer medication as ordered- Teach and encourage coping skills- Provide emotional support- Assess  patient/family for anxiety and ability to cope  Outcome: Progressing     Problem: Potential for Falls  Goal: Patient will remain free of falls  Description: INTERVENTIONS:- Educate patient/family on patient safety including physical limitations- Instruct patient to call for assistance with activity - Consult OT/PT to assist with strengthening/mobility - Keep Call bell within reach- Keep bed low and locked with side rails adjusted as appropriate- Keep care items and personal belongings within reach- Initiate and maintain comfort rounds- Make Fall Risk Sign visible to staff- Offer Toileting every 2 Hours, in advance of need- Initiate/Maintain bed/chair alarm- Obtain necessary fall risk management equipment.- Apply yellow socks and bracelet for high fall risk patients- Consider moving patient to room near nurses station  INTERVENTIONS:- Educate patient/family on patient safety including physical limitations- Instruct patient to call for assistance with activity - Consult OT/PT to assist with strengthening/mobility - Keep Call bell within reach- Keep bed low and locked with side rails adjusted as appropriate- Keep care items and personal belongings within reach- Initiate and maintain comfort rounds- Make Fall Risk Sign visible to staff- Offer Toileting every 2 Hours, in advance of need- Initiate/Maintain bed alarm- Obtain necessary fall risk management equipment: bracelet, socks, alarms- Apply yellow socks and bracelet for high fall risk patients- Consider moving patient to room near nurses station  Outcome: Progressing

## 2025-05-01 NOTE — PLAN OF CARE
Problem: PHYSICAL THERAPY ADULT  Goal: Performs mobility at highest level of function for planned discharge setting.  See evaluation for individualized goals.  Description: Treatment/Interventions: Functional transfer training, LE strengthening/ROM, Therapeutic exercise, Endurance training, Cognitive reorientation, Patient/family training, Bed mobility, Gait training, Elevations  Equipment Recommended: Walker       See flowsheet documentation for full assessment, interventions and recommendations.  Outcome: Progressing  Note: Prognosis: Good  Problem List: Decreased strength, Decreased endurance, Decreased mobility, Decreased coordination, Impaired balance, Decreased cognition, Impaired judgement, Decreased safety awareness  Assessment: The patient is notably more restless today, and he requires more assistance for redirection to task and situation. He was again ambulating with hand-hold assistance as he was impulsively standing. He was able to maneuver around obstacles with increased time and instruction in order to do so as otherwise he tends to walk into them. He is easily distracted today which is notably pronounced in a busy environment such as the hallway. He will benefit from continued therapies in order to maximize his functional mobility and safety.  Barriers to Discharge: Inaccessible home environment, Decreased caregiver support     Rehab Resource Intensity Level, PT: I (Maximum Resource Intensity)    See flowsheet documentation for full assessment.

## 2025-05-01 NOTE — PROGRESS NOTES
"Progress Note - Hospitalist   Name: Alexis Dennison 64 y.o. 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  Lymphoma/leukemia panel nondiagnostic  CT head on 4/3 with interval increase in ventricular caliber concerning for worsening hydrocephalus. Repeat LP on 4/7 was again undiagnostic.   MRI of the brain from 4/11-\"Interval enlargement of the dominant left anterior basal ganglionic mass lesion with greater surrounding vasogenic edema and mass effect. 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  S/p EVD, self removed over 4/26 weekend  Completed keppra 500mg BID x 7 days for postoperative seizure ppx per neurosurgery  Patient started on chemotherapy while inpatient.  Oncology input reviewed, plan for every 2 weeks methotrexate (cycle 1 received on 4/18, cycle 2 planned for 5/2) and weekly rituximab for 8 weeks (received on 4/23)   Steroid taper restarted as previous was faster than recommended, oncology started dexamethasone 4 mg 3 times daily with plan to " decrease to 4 mg twice daily, then 2 mg twice daily followed by 2 mg daily and then 1 mg daily.  Decrease dose every 4 days  Patient evaluated by [facility] who accepted patient to receive therapy while receiving weekly oncologic treatments as above.  Awaiting medical stability  Encephalopathy  Multifactorial in the setting of brain mass, vasogenic edema, LETY, medication induced and hospital-acquired delirium  Continue Seroquel per palliative  Currently on dexamethasone per oncology will will plan to wean starting on 5/2  Continue delirium precautions  Patient back to 1:1.  Off restraints since 4/27  Sepsis (HCC)  As evidenced on 4/29 morning with temperature and tachycardia.  Worsening kidney function.  Lactate within normal limits  S/p 1 L bolus, continue IV fluids  Elevated procalcitonin in the setting of recent rituximab  UA with innumerable bacteria and large leukocytes  Suspect UTI versus GI pathology.  No respiratory symptoms or findings of skin infection at this time  Urine culture with E. coli  Blood culture with 1 out of 2 with E. coli  Continue IV ceftriaxone for now  E coli bacteremia  Blood cultures with 1 out of 2 with E. Coli  CT abdomen pelvis on 4/30 with no acute pathology  Continue IV ceftriaxone for now per ID  Suspect secondary to UTI  Monitor hemodynamics  Patient cleared for PICC line  Constipation  Abdomen x-ray on 4/29 with large colonic stool burden without obstruction  CT abdomen pelvis on 4/30 with constipation  S/p enema 4/30 with only 1 small BM  Changed to lactulose, continue senna, Colace and suppository  Will trial another tapwater enema today  Ambulate patient as able  LETY (acute kidney injury) (HCC)  Lab Results   Component Value Date    CREATININE 1.47 (H) 05/01/2025    CREATININE 1.74 (H) 04/30/2025    CREATININE 2.04 (H) 04/29/2025     Baseline creatinine around 0.9, peak creatinine 2.7  Patient was evaluated by nephrology during this hospital stay and given improvement of creatinine  with IV fluids signed off on 4/25  Creatinine has remained stable  Continue to monitor.  Avoid nephrotoxins  Ambulatory dysfunction  Plan for acute rehab once medically stable  Fall precautions  Ambulate patient  Type 2 diabetes mellitus with hyperglycemia, without long-term current use of insulin (HCC)  Hemoglobin A1c 6.6% on 2/22/2025  PTA metformin 1000 mg daily, holding  On Lantus 10 units at bedtime with insulin sliding scale  Hypoglycemia protocol  Accu-Cheks reviewed and stable  Mixed hyperlipidemia  Continue statin  HTN, goal below 130/80  PTA losartan and hydrochlorothiazide, holding in the setting of soft BP   BP reviewed  Liver lesion  MRI obtained: No suspicious hepatic lesions.  There is a simple right hepatic lobe cyst.  Mild diffuse hepatic steatosis.  LFTs stable  Outpatient follow up advised  Thyroid nodule  Incidental findings on CT scan  Nonemergent thyroid ultrasound for follow-up in the outpatient setting  Pulmonary nodule  Imaging with 4 mm right lower lobe pulmonary nodule.  CT chest 3-month follow-up  E. coli UTI      VTE Pharmacologic Prophylaxis: VTE Score: 5 High Risk (Score >/= 5) - Pharmacological DVT Prophylaxis Ordered: heparin. Sequential Compression Devices Ordered.    Mobility:   Basic Mobility Inpatient Raw Score: 19  JH-HLM Goal: 6: Walk 10 steps or more  JH-HLM Achieved: 6: Walk 10 steps or more  JH-HLM Goal achieved. Continue to encourage appropriate mobility.    Patient Centered Rounds: I performed bedside rounds with nursing staff today.   Discussions with Specialists or Other Care Team Provider: , oncology, ID    Education and Discussions with Family / Patient: Updated  (daughter) via phone.    Current Length of Stay: 33 day(s)  Current Patient Status: Inpatient   Certification Statement: The patient will continue to require additional inpatient hospital stay due to as above  Discharge Plan: Anticipate discharge in 48-72 hrs to rehab  facility.    Code Status: Level 1 - Full Code    Subjective   No events overnight.  Patient reports feeling well this morning.  Tolerating oral intake with no nausea or vomiting.  Has been ambulating with a walker.  Has no pain    Objective :  Temp:  [96.6 °F (35.9 °C)-97.1 °F (36.2 °C)] 97.1 °F (36.2 °C)  HR:  [57-60] 60  BP: (108-123)/(63-70) 120/70  Resp:  [16-18] 16  SpO2:  [95 %-98 %] 95 %  O2 Device: None (Room air)    Body mass index is 24.67 kg/m².     Input and Output Summary (last 24 hours):     Intake/Output Summary (Last 24 hours) at 5/1/2025 1044  Last data filed at 5/1/2025 0855  Gross per 24 hour   Intake 5151.67 ml   Output 1245 ml   Net 3906.67 ml       Physical Exam  Vitals and nursing note reviewed.   Constitutional:       General: He is not in acute distress.     Appearance: He is well-developed.   HENT:      Head: Normocephalic and atraumatic.   Eyes:      Conjunctiva/sclera: Conjunctivae normal.   Cardiovascular:      Rate and Rhythm: Normal rate and regular rhythm.   Pulmonary:      Effort: No respiratory distress.      Breath sounds: Normal breath sounds. No wheezing or rhonchi.   Abdominal:      General: Bowel sounds are normal.      Tenderness: There is no abdominal tenderness.   Musculoskeletal:         General: No swelling.      Cervical back: Neck supple.   Skin:     General: Skin is warm and dry.      Capillary Refill: Capillary refill takes less than 2 seconds.   Neurological:      Mental Status: He is alert.         Lines/Drains:        Lab Results: I have reviewed the following results:   Results from last 7 days   Lab Units 05/01/25  0612 04/30/25  1144 04/29/25  0348   WBC Thousand/uL 4.62   < > 4.37   HEMOGLOBIN g/dL 10.2*   < > 13.3   HEMATOCRIT % 29.6*   < > 38.0   PLATELETS Thousands/uL 77*   < > 53*   SEGS PCT %  --   --  85*   LYMPHO PCT %  --   --  11*   MONO PCT %  --   --  3*   EOS PCT %  --   --  1    < > = values in this interval not displayed.     Results from last 7  days   Lab Units 05/01/25  0612 04/30/25  1304 04/29/25  0400   SODIUM mmol/L 134*   < > 143   POTASSIUM mmol/L 5.3   < > 4.1   CHLORIDE mmol/L 100   < > 103   CO2 mmol/L 28   < > 28   BUN mg/dL 42*   < > 46*   CREATININE mg/dL 1.47*   < > 2.04*   ANION GAP mmol/L 6   < > 12   CALCIUM mg/dL 8.2*   < > 9.6   ALBUMIN g/dL  --   --  3.5   TOTAL BILIRUBIN mg/dL  --   --  1.49*   ALK PHOS U/L  --   --  64   ALT U/L  --   --  62*   AST U/L  --   --  15   GLUCOSE RANDOM mg/dL 203*   < > 143*    < > = values in this interval not displayed.         Results from last 7 days   Lab Units 05/01/25  0658 04/30/25  2043 04/30/25  1613 04/30/25  1124 04/30/25  0744 04/30/25  0613 04/29/25  1954 04/29/25  1622 04/29/25  1115 04/29/25  0749 04/29/25  0612 04/29/25  0155   POC GLUCOSE mg/dl 205* 260* 176* 222* 169* 191* 194* 257* 166* 161* 144* 150*         Results from last 7 days   Lab Units 04/29/25  0406 04/29/25  0357   LACTIC ACID mmol/L  --  1.6   PROCALCITONIN ng/ml 3.93*  --        Recent Cultures (last 7 days):   Results from last 7 days   Lab Units 04/29/25  1448 04/29/25  0341   BLOOD CULTURE   --  No Growth at 48 hrs.  Escherichia coli*   GRAM STAIN RESULT   --  Gram negative rods*   URINE CULTURE  >100,000 cfu/ml Escherichia coli*  --        Imaging Results Review: I reviewed radiology reports from this admission including: CT abdomen/pelvis.  Other Study Results Review: No additional pertinent studies reviewed.    Last 24 Hours Medication List:     Current Facility-Administered Medications:     acetaminophen (TYLENOL) tablet 650 mg, Q6H PRN    allopurinol (ZYLOPRIM) tablet 300 mg, Daily    alteplase (CATHFLO) injection 2 mg, Q1MIN PRN    alteplase (CATHFLO) injection 2 mg, Q1MIN PRN    aluminum-magnesium hydroxide-simethicone (MAALOX) oral suspension 30 mL, Q4H PRN    atorvastatin (LIPITOR) tablet 20 mg, Daily    bisacodyl (DULCOLAX) rectal suppository 10 mg, Daily    calcium carbonate (TUMS) chewable tablet 1,000 mg,  Daily PRN    cefTRIAXone (ROCEPHIN) 2,000 mg in dextrose 5 % 50 mL IVPB, Q24H, Last Rate: Stopped (04/30/25 7535)    chlorhexidine (PERIDEX) 0.12 % oral rinse 15 mL, Q12H CAIT    dexamethasone (DECADRON) injection 4 mg, Q8H CAIT    docusate sodium (COLACE) capsule 100 mg, BID    heparin (porcine) subcutaneous injection 5,000 Units, Q8H CAIT    insulin glargine (LANTUS) subcutaneous injection 10 Units 0.1 mL, HS    insulin lispro (HumALOG/ADMELOG) 100 units/mL subcutaneous injection 5-25 Units, TID AC **AND** Fingerstick Glucose (POCT), TID AC    insulin lispro (HumALOG/ADMELOG) 100 units/mL subcutaneous injection 5-25 Units, HS    melatonin tablet 6 mg, HS    OLANZapine (ZyPREXA) IM injection 5 mg, BID PRN    ondansetron (ZOFRAN) injection 4 mg, Q6H PRN    oxyCODONE (ROXICODONE) split tablet 2.5 mg, Q4H PRN **OR** oxyCODONE (ROXICODONE) IR tablet 5 mg, Q4H PRN    pantoprazole (PROTONIX) EC tablet 40 mg, Early Morning    polyethylene glycol (MIRALAX) packet 17 g, BID    QUEtiapine (SEROquel) tablet 12.5 mg, Daily    senna (SENOKOT) tablet 17.2 mg, HS    sodium bicarbonate 100 mEq in dextrose 5 % 1,000 mL infusion, Continuous, Last Rate: 100 mL/hr (05/01/25 0314)    Administrative Statements   Today, Patient Was Seen By: Dionne Huang MD      **Please Note: This note may have been constructed using a voice recognition system.**

## 2025-05-01 NOTE — ASSESSMENT & PLAN NOTE
Abdomen x-ray on 4/29 with large colonic stool burden without obstruction  CT abdomen pelvis on 4/30 with constipation  S/p enema 4/30 with only 1 small BM  Changed to lactulose, continue senna, Colace and suppository  Will trial another tapwater enema today  Ambulate patient as able

## 2025-05-01 NOTE — ASSESSMENT & PLAN NOTE
"Palliative diagnosis: newly diagnosed large B cell lymphoma    Symptom management:  HS seroquel to be increased to 50mg   Added seroquel 12.5mg at lunch time as well  Olanzapine remains available BID PRN  Goal to wean patient from restraints to enable therapy/discharge planning  Decadron re-started at 4mg TID due to increased confusion and edema, plan to wean dose every 4 days  oxyIR 2.5-5mg PO Q4H PRN moderate-severe pain  Acetaminophen 650mg Q6H PRN mild pain  Bowel regimen in place    Goals:  Level 1 code status  Disease focused care without limits placed  Patient initiated high-dose methotrexate + rituximab inpatient  Current focus on rehabilitation, hopeful for discharge to [facility] when medically stable    Decisional apparatus:  Patient does not have capacity on exam today.  If capacity is lost, patient's substitute decision maker would default to spouse and adult children by [place] Act 169.  ER contacts:  Elizabeth Chester  Daughter  [phone]  Guy Hitchcock \"Naldo\" Phillip  Spouse  [phone]    Patient also has a son, Drew, and another daughter, Marina, who has down syndrome. Elizabeth is the main contact but family makes decisions as unit.    Advance Directive/Living Will/POLST: none on file    Social support:  Patient support system: spouse Naldo, 3 adult children, extended family.  Spouse Naldo lives locally and daughter Elizabeth is from [place] but Naldo prefers that she is primary contact due to language barrier (Chinese)  4/22: Met with Naldo along with palliative SW (Shikha and Brynn) and  (Kaylee #[id number]).  Provided medical update, supportive listening, answered questions to her satisfaction  Normalized experience of patient/family  Provided anticipatory guidance  Advocated for patient/family with interdisciplinary care team  Alexis is a , consider posey mat or fidget toys    Coordination of care:  Reviewed case with RN, primary team    Follow up  Palliative Care will continue to follow  Please reach out " via EPIC secure chat if questions or concerns arise.    We appreciate the invitation to be involved in this patient's care.

## 2025-05-02 DIAGNOSIS — C83.390 PRIMARY CENTRAL NERVOUS SYSTEM (CNS) LYMPHOMA: ICD-10-CM

## 2025-05-02 LAB
ANION GAP SERPL CALCULATED.3IONS-SCNC: 4 MMOL/L (ref 4–13)
BACTERIA UR QL AUTO: ABNORMAL /HPF
BASOPHILS # BLD AUTO: 0 THOUSANDS/ÂΜL (ref 0–0.1)
BASOPHILS NFR BLD AUTO: 0 % (ref 0–1)
BILIRUB UR QL STRIP: NEGATIVE
BUN SERPL-MCNC: 32 MG/DL (ref 5–25)
CALCIUM SERPL-MCNC: 8.4 MG/DL (ref 8.4–10.2)
CHLORIDE SERPL-SCNC: 102 MMOL/L (ref 96–108)
CLARITY UR: CLEAR
CO2 SERPL-SCNC: 31 MMOL/L (ref 21–32)
COLOR UR: ABNORMAL
CREAT SERPL-MCNC: 1.37 MG/DL (ref 0.6–1.3)
EOSINOPHIL # BLD AUTO: 0.01 THOUSAND/ÂΜL (ref 0–0.61)
EOSINOPHIL NFR BLD AUTO: 0 % (ref 0–6)
ERYTHROCYTE [DISTWIDTH] IN BLOOD BY AUTOMATED COUNT: 11.5 % (ref 11.6–15.1)
ERYTHROCYTE [DISTWIDTH] IN BLOOD BY AUTOMATED COUNT: 11.5 % (ref 11.6–15.1)
GFR SERPL CREATININE-BSD FRML MDRD: 53 ML/MIN/1.73SQ M
GLUCOSE SERPL-MCNC: 158 MG/DL (ref 65–140)
GLUCOSE SERPL-MCNC: 165 MG/DL (ref 65–140)
GLUCOSE SERPL-MCNC: 165 MG/DL (ref 65–140)
GLUCOSE SERPL-MCNC: 268 MG/DL (ref 65–140)
GLUCOSE SERPL-MCNC: 306 MG/DL (ref 65–140)
GLUCOSE UR STRIP-MCNC: ABNORMAL MG/DL
GLUCOSE UR STRIP-MCNC: ABNORMAL MG/DL
GLUCOSE UR STRIP-MCNC: NEGATIVE MG/DL
HCT VFR BLD AUTO: 28.2 % (ref 36.5–49.3)
HCT VFR BLD AUTO: 28.2 % (ref 36.5–49.3)
HGB BLD-MCNC: 10.1 G/DL (ref 12–17)
HGB BLD-MCNC: 10.1 G/DL (ref 12–17)
HGB UR QL STRIP.AUTO: NEGATIVE
IMM GRANULOCYTES # BLD AUTO: 0.03 THOUSAND/UL (ref 0–0.2)
IMM GRANULOCYTES NFR BLD AUTO: 1 % (ref 0–2)
KETONES UR STRIP-MCNC: NEGATIVE MG/DL
LEUKOCYTE ESTERASE UR QL STRIP: NEGATIVE
LYMPHOCYTES # BLD AUTO: 0.6 THOUSANDS/ÂΜL (ref 0.6–4.47)
LYMPHOCYTES NFR BLD AUTO: 17 % (ref 14–44)
MCH RBC QN AUTO: 31.3 PG (ref 26.8–34.3)
MCH RBC QN AUTO: 31.3 PG (ref 26.8–34.3)
MCHC RBC AUTO-ENTMCNC: 35.8 G/DL (ref 31.4–37.4)
MCHC RBC AUTO-ENTMCNC: 35.8 G/DL (ref 31.4–37.4)
MCV RBC AUTO: 87 FL (ref 82–98)
MCV RBC AUTO: 87 FL (ref 82–98)
MONOCYTES # BLD AUTO: 0.3 THOUSAND/ÂΜL (ref 0.17–1.22)
MONOCYTES NFR BLD AUTO: 9 % (ref 4–12)
NEUTROPHILS # BLD AUTO: 2.51 THOUSANDS/ÂΜL (ref 1.85–7.62)
NEUTS SEG NFR BLD AUTO: 73 % (ref 43–75)
NITRITE UR QL STRIP: NEGATIVE
NON-SQ EPI CELLS URNS QL MICRO: ABNORMAL /HPF
PH UR STRIP.AUTO: 7.5 [PH]
PH UR STRIP.AUTO: 8 [PH]
PH UR STRIP.AUTO: 8 [PH]
PLATELET # BLD AUTO: 110 THOUSANDS/UL (ref 149–390)
PLATELET # BLD AUTO: 110 THOUSANDS/UL (ref 149–390)
PMV BLD AUTO: 12.2 FL (ref 8.9–12.7)
PMV BLD AUTO: 12.2 FL (ref 8.9–12.7)
POTASSIUM SERPL-SCNC: 4 MMOL/L (ref 3.5–5.3)
PROT UR STRIP-MCNC: ABNORMAL MG/DL
PROT UR STRIP-MCNC: NEGATIVE MG/DL
PROT UR STRIP-MCNC: NEGATIVE MG/DL
RBC # BLD AUTO: 3.23 MILLION/UL (ref 3.88–5.62)
RBC # BLD AUTO: 3.23 MILLION/UL (ref 3.88–5.62)
RBC #/AREA URNS AUTO: ABNORMAL /HPF
SODIUM SERPL-SCNC: 137 MMOL/L (ref 135–147)
SP GR UR STRIP.AUTO: 1.01 (ref 1–1.03)
UROBILINOGEN UR STRIP-ACNC: 2 MG/DL
UROBILINOGEN UR STRIP-ACNC: 3 MG/DL
UROBILINOGEN UR STRIP-ACNC: 6 MG/DL
WBC # BLD AUTO: 3.51 THOUSAND/UL (ref 4.31–10.16)
WBC # BLD AUTO: 3.51 THOUSAND/UL (ref 4.31–10.16)
WBC #/AREA URNS AUTO: ABNORMAL /HPF

## 2025-05-02 PROCEDURE — 97530 THERAPEUTIC ACTIVITIES: CPT

## 2025-05-02 PROCEDURE — 85027 COMPLETE CBC AUTOMATED: CPT | Performed by: PHYSICIAN ASSISTANT

## 2025-05-02 PROCEDURE — 82948 REAGENT STRIP/BLOOD GLUCOSE: CPT

## 2025-05-02 PROCEDURE — 99233 SBSQ HOSP IP/OBS HIGH 50: CPT | Performed by: STUDENT IN AN ORGANIZED HEALTH CARE EDUCATION/TRAINING PROGRAM

## 2025-05-02 PROCEDURE — 85025 COMPLETE CBC W/AUTO DIFF WBC: CPT | Performed by: INTERNAL MEDICINE

## 2025-05-02 PROCEDURE — 99255 IP/OBS CONSLTJ NEW/EST HI 80: CPT | Performed by: INTERNAL MEDICINE

## 2025-05-02 PROCEDURE — NC001 PR NO CHARGE: Performed by: INTERNAL MEDICINE

## 2025-05-02 PROCEDURE — 97535 SELF CARE MNGMENT TRAINING: CPT

## 2025-05-02 PROCEDURE — 99232 SBSQ HOSP IP/OBS MODERATE 35: CPT | Performed by: INTERNAL MEDICINE

## 2025-05-02 PROCEDURE — 3E04305 INTRODUCTION OF OTHER ANTINEOPLASTIC INTO CENTRAL VEIN, PERCUTANEOUS APPROACH: ICD-10-PCS | Performed by: INTERNAL MEDICINE

## 2025-05-02 PROCEDURE — 81001 URINALYSIS AUTO W/SCOPE: CPT

## 2025-05-02 PROCEDURE — 80048 BASIC METABOLIC PNL TOTAL CA: CPT | Performed by: PHYSICIAN ASSISTANT

## 2025-05-02 PROCEDURE — 99233 SBSQ HOSP IP/OBS HIGH 50: CPT | Performed by: INTERNAL MEDICINE

## 2025-05-02 PROCEDURE — G0545 PR INHERENT VISIT TO INPT: HCPCS | Performed by: INTERNAL MEDICINE

## 2025-05-02 RX ORDER — SODIUM CHLORIDE 9 MG/ML
20 INJECTION, SOLUTION INTRAVENOUS ONCE AS NEEDED
Status: DISCONTINUED | OUTPATIENT
Start: 2025-05-02 | End: 2025-05-03

## 2025-05-02 RX ORDER — DEXAMETHASONE 4 MG/1
4 TABLET ORAL EVERY 8 HOURS SCHEDULED
Status: DISCONTINUED | OUTPATIENT
Start: 2025-05-02 | End: 2025-05-07 | Stop reason: HOSPADM

## 2025-05-02 RX ORDER — CEFAZOLIN SODIUM 2 G/50ML
2000 SOLUTION INTRAVENOUS EVERY 8 HOURS
Status: COMPLETED | OUTPATIENT
Start: 2025-05-02 | End: 2025-05-05

## 2025-05-02 RX ORDER — TEMOZOLOMIDE 180 MG/1
180 CAPSULE ORAL DAILY
Qty: 5 CAPSULE | Refills: 5 | Status: SHIPPED | OUTPATIENT
Start: 2025-05-02

## 2025-05-02 RX ORDER — TEMOZOLOMIDE 5 MG/1
15 CAPSULE ORAL DAILY
Qty: 15 CAPSULE | Refills: 5 | Status: SHIPPED | OUTPATIENT
Start: 2025-05-02

## 2025-05-02 RX ADMIN — INSULIN LISPRO 5 UNITS: 100 INJECTION, SOLUTION INTRAVENOUS; SUBCUTANEOUS at 07:57

## 2025-05-02 RX ADMIN — METHOTREXATE 6000 MG: 25 INJECTION, SOLUTION INTRA-ARTERIAL; INTRAMUSCULAR; INTRATHECAL; INTRAVENOUS at 15:47

## 2025-05-02 RX ADMIN — DEXAMETHASONE 4 MG: 4 TABLET ORAL at 21:35

## 2025-05-02 RX ADMIN — ALLOPURINOL 300 MG: 300 TABLET ORAL at 07:57

## 2025-05-02 RX ADMIN — PANTOPRAZOLE SODIUM 40 MG: 40 TABLET, DELAYED RELEASE ORAL at 05:35

## 2025-05-02 RX ADMIN — SODIUM BICARBONATE 100 ML/HR: 84 INJECTION INTRAVENOUS at 14:41

## 2025-05-02 RX ADMIN — Medication 6 MG: at 20:31

## 2025-05-02 RX ADMIN — CEFAZOLIN SODIUM 2000 MG: 2 SOLUTION INTRAVENOUS at 12:06

## 2025-05-02 RX ADMIN — INSULIN LISPRO 15 UNITS: 100 INJECTION, SOLUTION INTRAVENOUS; SUBCUTANEOUS at 12:07

## 2025-05-02 RX ADMIN — DEXAMETHASONE 4 MG: 4 TABLET ORAL at 14:41

## 2025-05-02 RX ADMIN — QUETIAPINE FUMARATE 12.5 MG: 25 TABLET ORAL at 12:06

## 2025-05-02 RX ADMIN — DEXAMETHASONE SODIUM PHOSPHATE 4 MG: 4 INJECTION INTRA-ARTICULAR; INTRALESIONAL; INTRAMUSCULAR; INTRAVENOUS; SOFT TISSUE at 05:36

## 2025-05-02 RX ADMIN — CHLORHEXIDINE GLUCONATE 0.12% ORAL RINSE 15 ML: 1.2 LIQUID ORAL at 07:56

## 2025-05-02 RX ADMIN — CEFAZOLIN SODIUM 2000 MG: 2 SOLUTION INTRAVENOUS at 20:31

## 2025-05-02 RX ADMIN — SODIUM CHLORIDE 20 ML/HR: 0.9 INJECTION, SOLUTION INTRAVENOUS at 15:00

## 2025-05-02 RX ADMIN — CHLORHEXIDINE GLUCONATE 0.12% ORAL RINSE 15 ML: 1.2 LIQUID ORAL at 20:31

## 2025-05-02 RX ADMIN — INSULIN LISPRO 20 UNITS: 100 INJECTION, SOLUTION INTRAVENOUS; SUBCUTANEOUS at 21:36

## 2025-05-02 RX ADMIN — HEPARIN SODIUM 5000 UNITS: 5000 INJECTION INTRAVENOUS; SUBCUTANEOUS at 21:35

## 2025-05-02 RX ADMIN — INSULIN LISPRO 5 UNITS: 100 INJECTION, SOLUTION INTRAVENOUS; SUBCUTANEOUS at 17:19

## 2025-05-02 RX ADMIN — INSULIN GLARGINE 10 UNITS: 100 INJECTION, SOLUTION SUBCUTANEOUS at 21:35

## 2025-05-02 RX ADMIN — QUETIAPINE FUMARATE 50 MG: 25 TABLET ORAL at 21:35

## 2025-05-02 RX ADMIN — ATORVASTATIN CALCIUM 20 MG: 20 TABLET, FILM COATED ORAL at 07:56

## 2025-05-02 RX ADMIN — SODIUM BICARBONATE 100 ML/HR: 84 INJECTION INTRAVENOUS at 02:20

## 2025-05-02 RX ADMIN — HEPARIN SODIUM 5000 UNITS: 5000 INJECTION INTRAVENOUS; SUBCUTANEOUS at 05:36

## 2025-05-02 RX ADMIN — HEPARIN SODIUM 5000 UNITS: 5000 INJECTION INTRAVENOUS; SUBCUTANEOUS at 14:41

## 2025-05-02 RX ADMIN — DEXAMETHASONE SODIUM PHOSPHATE: 10 INJECTION, SOLUTION INTRAMUSCULAR; INTRAVENOUS at 15:02

## 2025-05-02 NOTE — ASSESSMENT & PLAN NOTE
Due to UTI as below.  No other appreciable source.  LFTs stable.  CT A/P unremarkable.  Consider PICC infection, patinet self-removed.  Low suspicion for CNS infection given stable albeit fluctuating mental status.    Change antibiotics to cefazolin to continue for 7 days total antibiotics through 5/5

## 2025-05-02 NOTE — PLAN OF CARE
Problem: PAIN - ADULT  Goal: Verbalizes/displays adequate comfort level or baseline comfort level  Description: Interventions:- Encourage patient to monitor pain and request assistance- Assess pain using appropriate pain scale- Administer analgesics based on type and severity of pain and evaluate response- Implement non-pharmacological measures as appropriate and evaluate response- Notify physician/advanced practitioner if interventions unsuccessful or patient reports new pain  Outcome: Progressing     Problem: INFECTION - ADULT  Goal: Absence or prevention of progression during hospitalization  Description: INTERVENTIONS:- Assess and monitor for signs and symptoms of infection- Monitor lab/diagnostic results- Monitor all insertion sites, i.e. indwelling lines, tubes, and drains- [place] appropriate cooling/warming therapies per order- Administer medications as ordered- Instruct and encourage patient and family to use good hand hygiene technique- Identify and instruct in appropriate isolation precautions for identified infection/condition  Outcome: Progressing     Problem: SAFETY ADULT  Goal: Patient will remain free of falls  Description: INTERVENTIONS:- Educate patient/family on patient safety including physical limitations- Instruct patient to call for assistance with activity - Consult OT/PT to assist with strengthening/mobility - Keep Call bell within reach- Keep bed low and locked with side rails adjusted as appropriate- Keep care items and personal belongings within reach- Initiate and maintain comfort rounds- Make Fall Risk Sign visible to staff- Offer Toileting every 2 Hours, in advance of need- Initiate/Maintain bed/chair alarm- Obtain necessary fall risk management equipment.- Apply yellow socks and bracelet for high fall risk patients- Consider moving patient to room near nurses station  INTERVENTIONS:- Educate patient/family on patient safety including physical limitations- Instruct patient to call  for assistance with activity - Consult OT/PT to assist with strengthening/mobility - Keep Call bell within reach- Keep bed low and locked with side rails adjusted as appropriate- Keep care items and personal belongings within reach- Initiate and maintain comfort rounds- Make Fall Risk Sign visible to staff- Offer Toileting every 2 Hours, in advance of need- Initiate/Maintain bed alarm- Obtain necessary fall risk management equipment: bracelet, socks, alarms- Apply yellow socks and bracelet for high fall risk patients- Consider moving patient to room near nurses station  Outcome: Progressing  Goal: Maintain or return to baseline ADL function  Description: INTERVENTIONS:-  Assess patient's ability to carry out ADLs; assess patient's baseline for ADL function and identify physical deficits which impact ability to perform ADLs (bathing, care of mouth/teeth, toileting, grooming, dressing, etc.)- Assess/evaluate cause of self-care deficits - Assess range of motion- Assess patient's mobility; develop plan if impaired- Assess patient's need for assistive devices and provide as appropriate- Encourage maximum independence but intervene and supervise when necessary- Involve family in performance of ADLs- Assess for home care needs following discharge - Consider OT consult to assist with ADL evaluation and planning for discharge- Provide patient education as appropriate  Outcome: Progressing  Goal: Maintains/Returns to pre admission functional level  Description: INTERVENTIONS:- Perform AM-PAC 6 Click Basic Mobility/ Daily Activity assessment daily.- Set and communicate daily mobility goal to care team and patient/family/caregiver. - Collaborate with rehabilitation services on mobility goals if consulted- Reposition patient every 2 hours.- Dangle patient 3 times a day- Stand patient 3 times a day- Ambulate patient 3 times a day- Out of bed to chair 3 times a day - Out of bed for meals 3 times a day- Out of bed for toileting-  Record patient progress and toleration of activity level   Outcome: Progressing     Problem: DISCHARGE PLANNING  Goal: Discharge to home or other facility with appropriate resources  Description: INTERVENTIONS:- Identify barriers to discharge w/patient and caregiver- Arrange for needed discharge resources and transportation as appropriate- Identify discharge learning needs (meds, wound care, etc.)- Refer to Case Management Department for coordinating discharge planning if the patient needs post-hospital services based on physician/advanced practitioner order or complex needs related to functional status, cognitive ability, or social support system  Outcome: Progressing     Problem: Knowledge Deficit  Goal: Patient/family/caregiver demonstrates understanding of disease process, treatment plan, medications, and discharge instructions  Description: Complete learning assessment and assess knowledge base.Interventions:- Provide teaching at level of understanding- Provide teaching via preferred learning methods  Outcome: Progressing     Problem: NEUROSENSORY - ADULT  Goal: Achieves stable or improved neurological status  Description: INTERVENTIONS- Monitor and report changes in neurological status- Monitor vital signs such as temperature, blood pressure, glucose, and any other labs ordered - Initiate measures to prevent increased intracranial pressure- Monitor for seizure activity and implement precautions if appropriate    INTERVENTIONS- Monitor and report changes in neurological status- Monitor vital signs such as temperature, blood pressure, glucose, and any other labs ordered - Initiate measures to prevent increased intracranial pressure- Monitor for seizure activity and implement precautions if appropriate    Outcome: Progressing  Goal: Remains free of injury related to seizures activity  Description: INTERVENTIONS- Maintain airway, patient safety  and administer oxygen as ordered- Monitor patient for seizure  activity, document and report duration and description of seizure to physician/advanced practitioner- If seizure occurs,  ensure patient safety during seizure- Reorient patient post seizure- Seizure pads on all 4 side rails- Instruct patient/family to notify RN of any seizure activity including if an aura is experienced- Instruct patient/family to call for assistance with activity based on nursing assessment- Administer anti-seizure medications if ordered  INTERVENTIONS- Maintain airway, patient safety  and administer oxygen as ordered- Monitor patient for seizure activity, document and report duration and description of seizure to physician/advanced practitioner- If seizure occurs,  ensure patient safety during seizure- Reorient patient post seizure- Seizure pads on all 4 side rails- Instruct patient/family to notify RN of any seizure activity including if an aura is experienced- Instruct patient/family to call for assistance with activity based on nursing assessment- Administer anti-seizure medications if ordered  Outcome: Progressing  Goal: Achieves maximal functionality and self care  Description: INTERVENTIONS- Monitor swallowing and airway patency with patient fatigue and changes in neurological status- Encourage and assist patient to increase activity and self care. - Encourage visually impaired, hearing impaired and aphasic patients to use assistive/communication devices  INTERVENTIONS- Monitor swallowing and airway patency with patient fatigue and changes in neurological status- Encourage and assist patient to increase activity and self care. - Encourage visually impaired, hearing impaired and aphasic patients to use assistive/communication devices  Outcome: Progressing     Problem: METABOLIC, FLUID AND ELECTROLYTES - ADULT  Goal: Glucose maintained within target range  Description: INTERVENTIONS:- Monitor Blood Glucose as ordered- Assess for signs and symptoms of hyperglycemia and hypoglycemia- Administer  ordered medications to maintain glucose within target range- Assess nutritional intake and initiate nutrition service referral as needed  INTERVENTIONS:- Monitor Blood Glucose as ordered- Assess for signs and symptoms of hyperglycemia and hypoglycemia- Administer ordered medications to maintain glucose within target range- Assess nutritional intake and initiate nutrition service referral as needed  Outcome: Progressing  Goal: Electrolytes maintained within normal limits  Description: INTERVENTIONS:- Monitor labs and assess patient for signs and symptoms of electrolyte imbalances- Administer electrolyte replacement as ordered- Monitor response to electrolyte replacements, including repeat lab results as appropriate- Instruct patient on fluid and nutrition as appropriate  Outcome: Progressing  Goal: Fluid balance maintained  Description: INTERVENTIONS:- Monitor labs - Monitor I/O and WT- Instruct patient on fluid and nutrition as appropriate- Assess for signs & symptoms of volume excess or deficit  Outcome: Progressing     Problem: Prexisting or High Potential for Compromised Skin Integrity  Goal: Skin integrity is maintained or improved  Description: INTERVENTIONS:- Identify patients at risk for skin breakdown- Assess and monitor skin integrity- Assess and monitor nutrition and hydration status- Monitor labs - Assess for incontinence - Turn and reposition patient- Assist with mobility/ambulation- Relieve pressure over bony prominences- Avoid friction and shearing- Provide appropriate hygiene as needed including keeping skin clean and dry- Evaluate need for skin moisturizer/barrier cream- Collaborate with interdisciplinary team - Patient/family teaching- Consider wound care consult   Outcome: Progressing     Problem: SAFETY,RESTRAINT: NV/NON-SELF DESTRUCTIVE BEHAVIOR  Goal: Remains free of harm/injury (restraint for non violent/non self-detsructive behavior)  Description: INTERVENTIONS:- Instruct patient/family  regarding restraint use - Assess and monitor physiologic and psychological status - Provide interventions and comfort measures to meet assessed patient needs - Identify and implement measures to help patient regain control- Assess readiness for release of restraint   Outcome: Progressing  Goal: Returns to optimal restraint-free functioning  Description: INTERVENTIONS:- Assess the patient's behavior and symptoms that indicate continued need for restraint- Identify and implement measures to help patient regain control- Assess readiness for release of restraint   Outcome: Progressing     Problem: CARDIOVASCULAR - ADULT  Goal: Maintains optimal cardiac output and hemodynamic stability  Description: INTERVENTIONS:- Monitor I/O, vital signs and rhythm- Monitor for S/S and trends of decreased cardiac output- Administer and titrate ordered vasoactive medications to optimize hemodynamic stability- Assess quality of pulses, skin color and temperature- Assess for signs of decreased coronary artery perfusion- Instruct patient to report change in severity of symptoms  Outcome: Progressing  Goal: Absence of cardiac dysrhythmias or at baseline rhythm  Description: INTERVENTIONS:- Continuous cardiac monitoring, vital signs, obtain 12 lead EKG if ordered- Administer antiarrhythmic and heart rate control medications as ordered- Monitor electrolytes and administer replacement therapy as ordered  Outcome: Progressing     Problem: GASTROINTESTINAL - ADULT  Goal: Maintains or returns to baseline bowel function  Description: INTERVENTIONS:- Assess bowel function- Encourage oral fluids to ensure adequate hydration- Administer IV fluids if ordered to ensure adequate hydration- Administer ordered medications as needed- Encourage mobilization and activity- Consider nutritional services referral to assist patient with adequate nutrition and appropriate food choices  Outcome: Progressing  Goal: Maintains adequate nutritional  intake  Description: INTERVENTIONS:- Monitor percentage of each meal consumed- Identify factors contributing to decreased intake, treat as appropriate- Assist with meals as needed- Monitor I&O, weight, and lab values if indicated- Obtain nutrition services referral as needed  Outcome: Progressing     Problem: GENITOURINARY - ADULT  Goal: Maintains or returns to baseline urinary function  Description: INTERVENTIONS:- Assess urinary function- Encourage oral fluids to ensure adequate hydration if ordered- Administer IV fluids as ordered to ensure adequate hydration- Administer ordered medications as needed- Offer frequent toileting- Follow urinary retention protocol if ordered  Outcome: Progressing  Goal: Absence of urinary retention  Description: INTERVENTIONS:- Assess patient’s ability to void and empty bladder- Monitor I/O- Bladder scan as needed- Discuss with physician/AP medications to alleviate retention as needed- Discuss catheterization for long term situations as appropriate  Outcome: Progressing     Problem: SKIN/TISSUE INTEGRITY - ADULT  Goal: Skin Integrity remains intact(Skin Breakdown Prevention)  Description: Assess:-Perform [place] assessment every shift-Clean and moisturize skin every 2-Inspect skin when repositioning, toileting, and assisting with ADLS-Assess under medical devices such as restraints every 2-Assess extremities for adequate circulation and sensation Bed Management:-Have minimal linens on bed & keep smooth, unwrinkled-Change linens as needed when moist or perspiring-Avoid sitting or lying in one position for more than 2 hours while in bed-Keep HOB at 30degrees Toileting:-Offer bedside commode-Assess for incontinence every 2-Use incontinent care products after each incontinent episode such as proper cleaning equipment Activity:-Mobilize patient 3 times a day-Encourage activity and walks on unit-Encourage or provide ROM exercises -Turn and reposition patient every 2 Hours-Use appropriate  equipment to lift or move patient in bed-Instruct/ Assist with weight shifting every 2 hours  when out of bed in chair-Consider limitation of chair time 2 hour intervalsSkin Care:-Avoid use of baby powder, tape, friction and shearing, hot water or constrictive clothing-Relieve pressure over bony prominences using 2-Do not massage red bony areasNext Steps:-Teach patient strategies to minimize risks such as pressure injuries -  Outcome: Progressing  Goal: Incision(s), wounds(s) or drain site(s) healing without S/S of infection  Description: INTERVENTIONS- Assess and document dressing, incision, wound bed, drain sites and surrounding tissue- Provide patient and family education- Perform skin care/dressing changes as needed  Outcome: Progressing     Problem: HEMATOLOGIC - ADULT  Goal: Maintains hematologic stability  Description: INTERVENTIONS- Assess for signs and symptoms of bleeding or hemorrhage- Monitor labs- Administer supportive blood products/factors as ordered and appropriate  Outcome: Progressing     Problem: MUSCULOSKELETAL - ADULT  Goal: Maintain or return mobility to safest level of function  Description: INTERVENTIONS:- Assess patient's ability to carry out ADLs; assess patient's baseline for ADL function and identify physical deficits which impact ability to perform ADLs (bathing, care of mouth/teeth, toileting, grooming, dressing, etc.)- Assess/evaluate cause of self-care deficits - Assess range of motion- Assess patient's mobility- Assess patient's need for assistive devices and provide as appropriate- Encourage maximum independence but intervene and supervise when necessary- Involve family in performance of ADLs- Assess for home care needs following discharge - Consider OT consult to assist with ADL evaluation and planning for discharge- Provide patient education as appropriate  Outcome: Progressing     Problem: Nutrition/Hydration-ADULT  Goal: Nutrient/Hydration intake appropriate for improving,  restoring or maintaining nutritional needs  Description: Monitor and assess patient's nutrition/hydration status for malnutrition. Collaborate with interdisciplinary team and initiate plan and interventions as ordered.  Monitor patient's weight and dietary intake as ordered or per policy. Utilize nutrition screening tool and intervene as necessary. Determine patient's food preferences and provide high-protein, high-caloric foods as appropriate. INTERVENTIONS:- Monitor oral intake, urinary output, labs, and treatment plans- Assess nutrition and hydration status and recommend course of action- Evaluate amount of meals eaten- Assist patient with eating if necessary - Allow adequate time for meals- Recommend/ encourage appropriate diets, oral nutritional supplements, and vitamin/mineral supplements- Order, calculate, and assess calorie counts as needed- Recommend, monitor, and adjust tube feedings and TPN/PPN based on assessed needs- Assess need for intravenous fluids- Provide specific nutrition/hydration education as appropriate- Include patient/family/caregiver in decisions related to nutrition  Outcome: Progressing     Problem: COPING  Goal: Pt/Family able to verbalize concerns and demonstrate effective coping strategies  Description: INTERVENTIONS:- Assist patient/family to identify coping skills, available support systems and cultural and spiritual values- Provide emotional support, including active listening and acknowledgement of concerns of patient and caregivers- Reduce environmental stimuli, as able- Provide patient education- Assess for spiritual pain/suffering and initiate spiritual care, including notification of Pastoral Care or [facility] based community as needed- Assess effectiveness of coping strategies  Outcome: Progressing  Goal: Will report anxiety at manageable levels  Description: INTERVENTIONS:- Administer medication as ordered- Teach and encourage coping skills- Provide emotional support- Assess  patient/family for anxiety and ability to cope  Outcome: Progressing     Problem: Potential for Falls  Goal: Patient will remain free of falls  Description: INTERVENTIONS:- Educate patient/family on patient safety including physical limitations- Instruct patient to call for assistance with activity - Consult OT/PT to assist with strengthening/mobility - Keep Call bell within reach- Keep bed low and locked with side rails adjusted as appropriate- Keep care items and personal belongings within reach- Initiate and maintain comfort rounds- Make Fall Risk Sign visible to staff- Offer Toileting every 2 Hours, in advance of need- Initiate/Maintain bed/chair alarm- Obtain necessary fall risk management equipment.- Apply yellow socks and bracelet for high fall risk patients- Consider moving patient to room near nurses station  INTERVENTIONS:- Educate patient/family on patient safety including physical limitations- Instruct patient to call for assistance with activity - Consult OT/PT to assist with strengthening/mobility - Keep Call bell within reach- Keep bed low and locked with side rails adjusted as appropriate- Keep care items and personal belongings within reach- Initiate and maintain comfort rounds- Make Fall Risk Sign visible to staff- Offer Toileting every 2 Hours, in advance of need- Initiate/Maintain bed alarm- Obtain necessary fall risk management equipment: bracelet, socks, alarms- Apply yellow socks and bracelet for high fall risk patients- Consider moving patient to room near nurses station  Outcome: Progressing

## 2025-05-02 NOTE — PROGRESS NOTES
Progress Note - Nephrology   Name: Alexis Dennison 64 y.o. male I MRN: [id number]  Unit/Bed#: [facility] 904-01 I Date of Admission: 3/29/2025   Date of Service: 5/2/2025 I Hospital Day: 34     Assessment & Plan  LETY (acute kidney injury) (HCC)  #Non-Oliguric severe KDIGO LETY stage 3 with evidence of kidney recovery  Etiology: Multifactorial likely secondary to hemodynamic changes  Secondary to hemodynamic changes  Baseline creatinine 0.93 mg/dL  Peak creatinine: 2.7 mg/dL  Renal function continues to improve creatinine down to 1.37 mg/dL  UA: Microhematuria, leukocyturia  Renal imaging : No hydronephrosis  Treatment:  No indication of dialysis, kidney function improving   Avoid hypotension, no NSAIDs, no IV contrast   Patient plan for second dose of methotrexate  Continue alkalinization of the urine with bicarbonate drip check urine analysis every 6 hours.  Monitor renal function closely while on methotrexate.  Check uric acid level, CMP, phosphorus and ionized calcium level tomorrow.  Discussed with the primary team  Monitor methotrexate levels  Sent a message to the oncology team.    HTN, goal below 130/80  Euvolemic and blood pressure control  Sodium bicarbonate drip restarted April 30  Primary CNS lymphoma  Status post methotrexate  Alkalinization of the urine with a bicarbonate drip for methotrexate  Continue to be confused, 1:1    Metabolic alkalosis  Iatrogenic in the setting of a bicarbonate drip  Bicarbonate level 31  Check urine analysis every 6 hours  Check daily VBG    I have reviewed the nephrology recommendations including assessment and plan, with patient, family, primary team and oncology, and we are in agreement with renal plan including the information outlined above. Nephrology service will follow.    Subjective   Brief History of Admission - 63 yo ma with PMH of CNS lymphoma p/w confusion. Nephrology is consulted for management of LETY .  Nephrology reconsulted for methotrexate dosing with recent acute  kidney injury.    Patient is confused mostly history obtained from chart and family.    Objective :  Temp:  [95 °F (35 °C)-98.7 °F (37.1 °C)] 97.9 °F (36.6 °C)  HR:  [56-64] 61  BP: (124-137)/(59-80) 136/66  Resp:  [17-18] 18  SpO2:  [94 %-97 %] 96 %  O2 Device: None (Room air)    Current Weight: Weight - Scale: 75.5 kg (166 lb 7.2 oz)  First Weight: Weight - Scale: 86.2 kg (190 lb)  I/O         04/30 0701  05/01 0700 05/01 0701  05/02 0700 05/02 0701  05/03 0700    P.O. 2180 118     I.V. (mL/kg) 3041.7 (39) 2310 (30.6)     IV Piggyback 50      Total Intake(mL/kg) 5271.7 (67.6) 2428 (32.2)     Urine (mL/kg/hr) 920 (0.5) 3000 (1.7)     Stool 0 0     Total Output 920 3000     Net +4351.7 -572            Unmeasured Urine Occurrence 6 x 4 x     Unmeasured Stool Occurrence 0 x 1 x           Physical Exam  Vitals and nursing note reviewed. Exam conducted with a chaperone present.   Constitutional:       General: He is not in acute distress.     Appearance: He is well-developed. He is not diaphoretic.   HENT:      Head: Normocephalic and atraumatic.   Eyes:      General: No scleral icterus.     Pupils: Pupils are equal, round, and reactive to light.   Cardiovascular:      Rate and Rhythm: Normal rate and regular rhythm.      Heart sounds: Normal heart sounds. No murmur heard.     No friction rub. No gallop.   Pulmonary:      Effort: Pulmonary effort is normal. No respiratory distress.      Breath sounds: Normal breath sounds. No wheezing or rales.   Chest:      Chest wall: No tenderness.   Abdominal:      General: Bowel sounds are normal. There is no distension.      Palpations: Abdomen is soft.      Tenderness: There is no abdominal tenderness. There is no rebound.   Musculoskeletal:         General: Normal range of motion.      Cervical back: Normal range of motion and neck supple.   Skin:     Findings: No rash.   Neurological:      Mental Status: He is alert and oriented to person, place, and time.          Medications:    Current Facility-Administered Medications:     acetaminophen (TYLENOL) tablet 650 mg, 650 mg, Oral, Q6H PRN, Rustam Drummond MD    allopurinol (ZYLOPRIM) tablet 300 mg, 300 mg, Oral, Daily, Rustam Drummond MD, 300 mg at 05/02/25 0757    alteplase (CATHFLO) injection 2 mg, 2 mg, Intracatheter, Q1MIN PRN, Rustam Drummond MD    alteplase (CATHFLO) injection 2 mg, 2 mg, Intracatheter, Q1MIN PRN, Eva Ortiz MD    aluminum-magnesium hydroxide-simethicone (MAALOX) oral suspension 30 mL, 30 mL, Oral, Q4H PRN, Rustam Drummond MD    atorvastatin (LIPITOR) tablet 20 mg, 20 mg, Oral, Daily, Rustam Drummond MD, 20 mg at 05/02/25 0756    bisacodyl (DULCOLAX) rectal suppository 10 mg, 10 mg, Rectal, Daily, Rustam Drummond MD, 10 mg at 05/01/25 1259    calcium carbonate (TUMS) chewable tablet 1,000 mg, 1,000 mg, Oral, Daily PRN, Rustam Drummond MD    cefTRIAXone (ROCEPHIN) 2,000 mg in dextrose 5 % 50 mL IVPB, 2,000 mg, Intravenous, Q24H, Lisa Singh MD, Last Rate: 100 mL/hr at 05/01/25 1802, 2,000 mg at 05/01/25 1802    chlorhexidine (PERIDEX) 0.12 % oral rinse 15 mL, 15 mL, Mouth/Throat, Q12H CAIT, Rustam Drummond MD, 15 mL at 05/02/25 0756    dexamethasone (DECADRON) injection 4 mg, 4 mg, Intravenous, Q8H CAIT, Ahsan Fitzgerald MD, 4 mg at 05/02/25 0536    heparin (porcine) subcutaneous injection 5,000 Units, 5,000 Units, Subcutaneous, Q8H CAIT, Rustam Drummond MD, 5,000 Units at 05/02/25 0536    insulin glargine (LANTUS) subcutaneous injection 10 Units 0.1 mL, 10 Units, Subcutaneous, , Rustam Drummond MD, 10 Units at 05/01/25 3181    insulin lispro (HumALOG/ADMELOG) 100 units/mL subcutaneous injection 5-25 Units, 5-25 Units, Subcutaneous, TID AC, 5 Units at 05/02/25 0757 **AND** Fingerstick Glucose (POCT), , , TID AC, Rustam Drummond MD    insulin lispro (HumALOG/ADMELOG) 100 units/mL subcutaneous injection  5-25 Units, 5-25 Units, Subcutaneous, HS, Rustam Drummond MD, 10 Units at 05/01/25 2328    melatonin tablet 6 mg, 6 mg, Oral, HS, Rustam Drummond MD, 6 mg at 05/01/25 2325    OLANZapine (ZyPREXA) IM injection 5 mg, 5 mg, Intramuscular, BID PRN, Dionne Huang MD, 5 mg at 05/01/25 1122    ondansetron (ZOFRAN) injection 4 mg, 4 mg, Intravenous, Q6H PRN, Rustam Drummond MD, 4 mg at 04/14/25 0930    oxyCODONE (ROXICODONE) split tablet 2.5 mg, 2.5 mg, Oral, Q4H PRN **OR** oxyCODONE (ROXICODONE) IR tablet 5 mg, 5 mg, Oral, Q4H PRN, Rustam Drummond MD, 5 mg at 04/29/25 1351    pantoprazole (PROTONIX) EC tablet 40 mg, 40 mg, Oral, Early Morning, Rustam Drummond MD, 40 mg at 05/02/25 0535    QUEtiapine (SEROquel) tablet 12.5 mg, 12.5 mg, Oral, Daily, Reji Kay MD, 12.5 mg at 05/01/25 1259    QUEtiapine (SEROquel) tablet 50 mg, 50 mg, Oral, HS, Rylie Stapleton PA-C, 50 mg at 05/01/25 2325    senna (SENOKOT) tablet 17.2 mg, 2 tablet, Oral, HS, Rustam Drummond MD, 17.2 mg at 05/01/25 2325    sodium bicarbonate 100 mEq in dextrose 5 % 1,000 mL infusion, 100 mL/hr, Intravenous, Continuous, Bryson Garcia DO, Last Rate: 100 mL/hr at 05/02/25 0220, 100 mL/hr at 05/02/25 0220      Lab Results: I have reviewed the following results:  Results from last 7 days   Lab Units 05/02/25  0544 05/01/25  0612 04/30/25  1304 04/30/25  1144 04/29/25  0400 04/29/25  0348 04/28/25  0721 04/27/25  0524 04/26/25  0551   WBC Thousand/uL 3.51*  3.51* 4.62  --  5.69  --  4.37 7.63 8.72 9.58   HEMOGLOBIN g/dL 10.1*  10.1* 10.2*  --  11.3*  --  13.3 12.6 12.0 12.1   HEMATOCRIT % 28.2*  28.2* 29.6*  --  32.4*  --  38.0 35.8* 33.9* 35.2*   PLATELETS Thousands/uL 110*  110* 77*  --  67*  --  53* 53* 48* 61*   POTASSIUM mmol/L 4.0 5.3 4.4  --  4.1  --  3.8 3.8 3.9   CHLORIDE mmol/L 102 100 102  --  103  --  105 106 107   CO2 mmol/L 31 28 28  --  28  --  30 30 29   BUN mg/dL 32* 42* 49*  --  46*  --  43*  "42* 45*   CREATININE mg/dL 1.37* 1.47* 1.74*  --  2.04*  --  1.68* 1.60* 1.55*   CALCIUM mg/dL 8.4 8.2* 9.1  --  9.6  --  9.5 9.5 9.4   MAGNESIUM mg/dL  --   --   --   --   --   --   --  1.8*  --    ALBUMIN g/dL  --   --   --   --  3.5  --   --  3.1* 3.3*       Administrative Statements   I have spent a total time of 30 minutes in caring for this patient on the day of the visit/encounter including Risk factor reductions, Impressions, Counseling / Coordination of care, Documenting in the medical record, Reviewing/placing orders in the medical record (including tests, medications, and/or procedures), Obtaining or reviewing history  , and Communicating with other healthcare professionals .  Portions of the record may have been created with voice recognition software. Occasional wrong word or \"sound a like\" substitutions may have occurred due to the inherent limitations of voice recognition software. Read the chart carefully and recognize, using context, where substitutions have occurred.If you have any questions, please contact the dictating provider.  "

## 2025-05-02 NOTE — ASSESSMENT & PLAN NOTE
Diagnosed by LP, brain biopsy.  Complicated by obstructive hydrocephalus, status post EVD 4/13 through 4/19.  Status post Rituxan, high-dose methotrexate, and intrathecal cytarabine on 4/17.    Given clinical improvement, would be low risk from ID perspective for chemo today

## 2025-05-02 NOTE — DISCHARGE SUPPORT
Case Management Assessment & Discharge Planning Note    Patient name Alexis Dennison  Location [facility] 904/[facility] 904-01 MRN [id number]  : 1960 Date 2025       Current Admission Date: 3/29/2025  Current Admission Diagnosis:Primary CNS lymphoma   Patient Active Problem List    Diagnosis Date Noted Date Diagnosed    E. coli UTI 2025     Sepsis (HCC) 2025     E coli bacteremia 2025     Metabolic alkalosis 2025     LETY (acute kidney injury) (HCC) 2025     Goals of care, counseling/discussion 2025     Palliative care encounter 2025     Primary CNS lymphoma 2025     Encephalopathy 2025     Constipation 2025     Ambulatory dysfunction 2025     Thyroid nodule 2025     Pulmonary nodule 2025     Liver lesion 2025     Primary central nervous system (CNS) lymphoma 2025     Type 2 diabetes mellitus with hyperglycemia, without long-term current use of insulin (HCC) 2022     HTN, goal below 130/80 2022     Mixed hyperlipidemia 2022     Vitamin D deficiency 2022     NAFLD (nonalcoholic fatty liver disease) 2022       LOS (days): 34  Geometric Mean LOS (GMLOS) (days): 11.1  Days to GMLOS:-22.5   Facility Authorization Initiated  [facility] received request for auth from : Zehra HUIZAR  Authorization Request Submitted for: Acute Rehab  Requested Start of Care Date: 25  Facility Name: [facility]  Facility NPI: [id number]  Facility MD: Ashley DePadua  Facility MD NPI: [id number]  Authorization initiated by contacting insurance: Other ([employer])  Via: [employer]  Clinicals submitted via: Portal Attachment  Pending reference #: [id number]   notified: Zehra HUIZAR     Updates to authorization status will be noted in chart.    Please reach out to CM for updates on any clinical information.

## 2025-05-02 NOTE — ASSESSMENT & PLAN NOTE
#Non-Oliguric severe KDIGO LETY stage 3 with evidence of kidney recovery  Etiology: Multifactorial likely secondary to hemodynamic changes  Secondary to hemodynamic changes  Baseline creatinine 0.93 mg/dL  Peak creatinine: 2.7 mg/dL  Renal function continues to improve creatinine down to 1.37 mg/dL  UA: Microhematuria, leukocyturia  Renal imaging : No hydronephrosis  Treatment:  No indication of dialysis, kidney function improving   Avoid hypotension, no NSAIDs, no IV contrast   Patient plan for second dose of methotrexate  Continue alkalinization of the urine with bicarbonate drip check urine analysis every 6 hours.  Monitor renal function closely while on methotrexate.  Check uric acid level, CMP, phosphorus and ionized calcium level tomorrow.  Discussed with the primary team  Monitor methotrexate levels  Sent a message to the oncology team.

## 2025-05-02 NOTE — ASSESSMENT & PLAN NOTE
Patient met sepsis criteria on 4/29 with accompanying confusion and agitation.  UA was positive, UCx and BCx showed E. Coli sensitive to cephalosporins.  ID was consulted, is managing, and has approved to restarting chemo as early as 5/2.

## 2025-05-02 NOTE — ASSESSMENT & PLAN NOTE
Appreciate oncology input  methotrexate and rituximab started this admission  Receiving methotrexate today  Weekly rituximab

## 2025-05-02 NOTE — ASSESSMENT & PLAN NOTE
64 yoM with PMHx of DM2, NAFLD, and HT who presented with progressive encephalopathy found to have multiple scattered nodular cerebral and cerebellar enhancement who underwent two non-diagnostic LPs prompting stereotactic brain biopsy (4/14) which showed large B-cell lymphoma.  See oncology progress note on 4/21 for full diagnostic work up.  On 4/29, agitation led to infectious work up with showed UA+ and BCx with 2/2 E. Coli sensitive to ceftriaxone.  ID was consulted is managing and has approved to restart chemo 5/2.    Plan:  High-dose methotrexate (3 g/m²) every 2 weeks (C1 received 4/18, C2 planned 5/1 but delayed for bacteremia until 5/2), may consider regimen with every 4 week administration which would be more conducive to rehab, C2D1 ok to treat 5/2  New PICC has been placed  UA Q6H to assess alkalinization of urine (goal urine pH>7.0)  IVF: HCO3 100 mEq at 150 mL/hr  Methotrexate levels every 24 hours beginning 24 hours from the start of his infusion  Rituximab (375 mg/m²) weekly x8 weeks  Re-consulted Nephrology given LETY with first cycle, denisa holguin  Final path is pending from biopsy (4/14)

## 2025-05-02 NOTE — PROGRESS NOTES
"Progress Note - Palliative Care   Name: Alexis Dennison 64 y.o. male I MRN: [id number]  [facility] I Date of Admission: 3/29/2025   Date of Service: 5/2/2025 I Hospital Day: 34     Assessment & Plan  Palliative care encounter  Palliative diagnosis: newly diagnosed large B cell lymphoma    Symptom management:  Seroquel 12.5mg at lunch 50mg HS (agitation improved on this regimen)  Olanzapine remains available BID PRN  Goal to wean patient from 1:1 to enable therapy/discharge planning  Decadron re-started at 4mg TID due to increased confusion and edema, plan to wean dose every 4 days   Switched to PO per [facility] requirement  oxyIR 2.5-5mg PO Q4H PRN moderate-severe pain  Acetaminophen 650mg Q6H PRN mild pain  Bowel regimen in place    Goals:  Level 1 code status  Disease focused care without limits placed  Patient initiated high-dose methotrexate + rituximab inpatient  Current focus on rehabilitation, hopeful for discharge to [facility] when medically stable in the next several days    Decisional apparatus:  Patient does not have capacity on exam today.  If capacity is lost, patient's substitute decision maker would default to spouse and adult children by [place] Act 169.  ER contacts:  Elizabeth Chester  Daughter  [phone]  Guy Hitchcock \"Naldo\" Phillip  Spouse  [phone]    Patient also has a son, Drew, and another daughter, Marina, who has down syndrome. Elizabeth is the main contact but family makes decisions as unit.    Advance Directive/Living Will/POLST: none on file    Social support:  Patient support system: spouse Naldo, 3 adult children, extended family.  Spouse Naldo lives locally and daughter Elizabeth is from [place] but Naldo prefers that she is primary contact due to language barrier (Chinese)  4/22: Met with Naldo along with palliative SW (Shikha and Brynn) and  (Kaylee #[id number]).  Provided medical update, supportive listening, answered questions to her satisfaction  Normalized experience of patient/family  Provided " anticipatory guidance  Advocated for patient/family with interdisciplinary care team  Alexis is a , consider posey mat or fidget toys    Coordination of care:  Reviewed case with RN, primary team    Follow up  Palliative Care will continue to follow  Please reach out via [employer] secure chat if questions or concerns arise.    We appreciate the invitation to be involved in this patient's care.   Primary CNS lymphoma  Appreciate oncology input  methotrexate and rituximab started this admission  Receiving methotrexate today  Weekly rituximab  Encephalopathy  In the setting of the above  On exam, he is confused but interactive. Oriented to name only, does not recall , disoriented to all else.  EVD dislodged due to  agitation while admitted to ICU but did not need to be replaced secondary to stable ventricle size  Off restraints, on 1:1.  Improves with increased activity, hopeful for discharge to acute rehab pending course  Pulmonary nodule  CTA 3/29 demonstrates non specific 4 mm right lower lobe pulmonary nodule  Recommended 3 mo f/u  Liver lesion  CTA 3/29 demonstrates non specific 12 mm hypodense lesion within right hepatic lobe, MRI recommended  MRI 3/30 demonstrates no suspicious hepatic lesions, simple right hepatic lobe cyst noted    LETY (acute kidney injury) (HCC)  Nephrology following. Likely secondary to methotrexate and hypotensive episode. Improved. Leucovorin for supra therapeutic methotrexate levels completed.  E coli bacteremia  ID following, PICC to be replaced today  E. coli UTI    Interval history:       Patient received a dose of olanzapine at 11:22 yesterday AM but not since then. Did receive scheduled seroquel at 23:25 last night. Tolerating well. During time of encounter he is sitting up in bed, conversing with his wife and 1:1, positive mood and says he's feeling better. Remains confused. Tangential speech.    MEDICATIONS / ALLERGIES:     all current active meds have been  reviewed    No Known Allergies    OBJECTIVE:    Physical Exam  Physical Exam  HENT:      Head:      Comments: EVD incision site CDI  Eyes:      Conjunctiva/sclera: Conjunctivae normal.   Cardiovascular:      Rate and Rhythm: Normal rate.   Pulmonary:      Effort: No respiratory distress.   Abdominal:      Tenderness: There is no guarding.   Musculoskeletal:         General: No swelling.   Skin:     General: Skin is warm and dry.   Neurological:      Mental Status: He is alert.      Comments: Alert, oriented to himself, otherwise confused    Psychiatric:      Comments: Pleasant mood         Lab Results:   Results from last 7 days   Lab Units 05/02/25  0544 05/01/25  0612 04/30/25  1144 04/29/25  0348 04/28/25  0721 04/27/25  0524   WBC Thousand/uL 3.51*  3.51* 4.62 5.69 4.37   < > 8.72   HEMOGLOBIN g/dL 10.1*  10.1* 10.2* 11.3* 13.3   < > 12.0   HEMATOCRIT % 28.2*  28.2* 29.6* 32.4* 38.0   < > 33.9*   PLATELETS Thousands/uL 110*  110* 77* 67* 53*   < > 48*   SEGS PCT % 73  --   --  85*  --  77*   MONO PCT % 9  --   --  3*  --  3*   EOS PCT % 0  --   --  1  --  2    < > = values in this interval not displayed.     Results from last 7 days   Lab Units 05/02/25  0544 05/01/25  0612 04/30/25  1304 04/29/25  0400 04/28/25  0721 04/27/25  0524 04/26/25  0551   POTASSIUM mmol/L 4.0 5.3 4.4 4.1   < > 3.8 3.9   CHLORIDE mmol/L 102 100 102 103   < > 106 107   CO2 mmol/L 31 28 28 28   < > 30 29   BUN mg/dL 32* 42* 49* 46*   < > 42* 45*   CREATININE mg/dL 1.37* 1.47* 1.74* 2.04*   < > 1.60* 1.55*   CALCIUM mg/dL 8.4 8.2* 9.1 9.6   < > 9.5 9.4   ALK PHOS U/L  --   --   --  64  --  46 41   ALT U/L  --   --   --  62*  --  83* 101*   AST U/L  --   --   --  15  --  23 39    < > = values in this interval not displayed.       Imaging Studies: reviewed pertinent studies   EKG, Pathology, and Other Studies: reviewed pertinent studies    Counseling / Coordination of Care    Total floor / unit time spent today 25+ minutes. Greater than  50% of total time was spent with the patient and / or family counseling and / or coordination of care. A description of the counseling / coordination of care: symptom assessment and management, medication review, psychosocial support, chart review, lab review, supportive listening, anticipatory guidance, and coordination with primary team, PCA, CM, spouse at bedside.

## 2025-05-02 NOTE — ASSESSMENT & PLAN NOTE
Multifactorial in the setting of brain mass, vasogenic edema, medication induced and hospital-acquired delirium  Continue Seroquel   On steroid taper as above  Continue delirium precautions  Patient on 1:1 for safety.

## 2025-05-02 NOTE — ASSESSMENT & PLAN NOTE
"Palliative diagnosis: newly diagnosed large B cell lymphoma    Symptom management:  Seroquel 12.5mg at lunch 50mg HS (agitation improved on this regimen)  Olanzapine remains available BID PRN  Goal to wean patient from 1:1 to enable therapy/discharge planning  Decadron re-started at 4mg TID due to increased confusion and edema, plan to wean dose every 4 days   Switched to PO per [facility] requirement  oxyIR 2.5-5mg PO Q4H PRN moderate-severe pain  Acetaminophen 650mg Q6H PRN mild pain  Bowel regimen in place    Goals:  Level 1 code status  Disease focused care without limits placed  Patient initiated high-dose methotrexate + rituximab inpatient  Current focus on rehabilitation, hopeful for discharge to [facility] when medically stable in the next several days    Decisional apparatus:  Patient does not have capacity on exam today.  If capacity is lost, patient's substitute decision maker would default to spouse and adult children by [place] Act 169.  ER contacts:  Elizabeth Chester  Daughter  [phone]  Guy Hitchcock \"Naldo\" Phillip  Spouse  [phone]    Patient also has a son, Drew, and another daughter, Marina, who has down syndrome. Elizabeth is the main contact but family makes decisions as unit.    Advance Directive/Living Will/POLST: none on file    Social support:  Patient support system: spouse Naldo, 3 adult children, extended family.  Spouse Naldo lives locally and daughter Elizabeth is from [place] but Naldo prefers that she is primary contact due to language barrier (Chinese)  4/22: Met with Naldo along with palliative SW (Shikha and Brynn) and  (Kaylee #[id number]).  Provided medical update, supportive listening, answered questions to her satisfaction  Normalized experience of patient/family  Provided anticipatory guidance  Advocated for patient/family with interdisciplinary care team  Alexis is a , consider posey mat or fidget toys    Coordination of care:  Reviewed case with RN, primary team    Follow up  Palliative Care " will continue to follow  Please reach out via [employer] secure chat if questions or concerns arise.    We appreciate the invitation to be involved in this patient's care.

## 2025-05-02 NOTE — ASSESSMENT & PLAN NOTE
Abdomen x-ray on 4/29 with large colonic stool burden without obstruction  CT abdomen pelvis on 4/30 with constipation  S/p enema and suppository  Resolved  Continue bowel regimen  Ambulate patient as able

## 2025-05-02 NOTE — ASSESSMENT & PLAN NOTE
Multifactorial due to underlying CNS lymphoma, medications, sepsis and infection as above.  Stable.

## 2025-05-02 NOTE — PLAN OF CARE
Problem: OCCUPATIONAL THERAPY ADULT  Goal: Performs self-care activities at highest level of function for planned discharge setting.  See evaluation for individualized goals.  Description: Treatment Interventions: ADL retraining, Functional transfer training, Visual perceptual retraining, Endurance training, Cognitive reorientation, Patient/family training, Equipment evaluation/education, Compensatory technique education, Activityengagement  Equipment Recommended:  (tbd)       See flowsheet documentation for full assessment, interventions and recommendations.   Note: Limitation: Decreased ADL status, Decreased Safe judgement during ADL, Decreased cognition, Decreased endurance, Decreased self-care trans, Decreased high-level ADLs  Prognosis: Fair  Assessment: Pt seen for skilled OT treatment session from 1238 to 1302 w/ interventions focusing on ADL participation, activity tolerance, sitting tolerance, sitting balance, standing tolerance, standing balance, bed mobility , transfer skills, and fxnl mobility. Pt was agreeable and willing to participate in session. Pt engaged in the following tasks: min A for UB dressing and mod A for LB dressing. Pt also required S for bed mobility and transfers and min Ax1-CCG for fxnl mobility w/ RW. In comparison to previous session, pt demonstrated improvements in mobility tasks as he required less physical assistance for transfers today. Pt continues to require consistent levels of physical assistance for ADLs at this time. Pt required increased cueing for attention/re-direction to tasks due to impaired cognition and impulsivity. Pt continues to be functioning below baseline level as occupational performance remains limited by decreased ADL status, decreased activity tolerance, decreased endurance, decreased standing tolerance, decreased standing balance, decreased transfer skills, decreased fxnl mobility, decreased safety awareness, decreased insight into deficits, and impaired  cognition . From OT standpoint, recommend Level I (Maximum Resource Intensity) at time of d/c. Pt will benefit from continued OT treatment while in acute care to address deficits as defined above and maximize level of functional independence with ADLs and functional mobility. Pt supine in bed w/ all needs met at end of session.    Rehab Resource Intensity Level, OT: I (Maximum Resource Intensity)

## 2025-05-02 NOTE — CONSULTS
Please see my progress note for today patient had a nephrology consultation done prior for we had signed off.  This is a reconsult.

## 2025-05-02 NOTE — ASSESSMENT & PLAN NOTE
Iatrogenic in the setting of a bicarbonate drip  Bicarbonate level 31  Check urine analysis every 6 hours  Check daily VBG

## 2025-05-02 NOTE — PROGRESS NOTES
Progress Note - Infectious Disease   Name: Alexis Dennison 64 y.o. male I MRN: [id number]  [facility] I Date of Admission: 3/29/2025   Date of Service: 5/2/2025 I Hospital Day: 34    Assessment & Plan  Sepsis (HCC)  Fever, HR.  Due to bacteremia, UTI.  No other appreciable source of infection.  ROS and exam otherwise benign..  Hemodynamically stable and nontoxic.  Improved.    Continue antibiotics as below  Follow temperatures closely  Recheck CBC in AM  Supportive care as per the primary service  E coli bacteremia  Due to UTI as below.  No other appreciable source.  LFTs stable.  CT A/P unremarkable.  Consider PICC infection, patinet self-removed.  Low suspicion for CNS infection given stable albeit fluctuating mental status.    Change antibiotics to cefazolin to continue for 7 days total antibiotics through 5/5  E. coli UTI  Pyuria, recent external catheter.  No evidence for retention.  Unreliable historian in terms of symptoms.    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  Complicated by obstructive hydrocephalus, status post EVD 4/13 through 4/19.  Status post Rituxan, high-dose methotrexate, and intrathecal cytarabine on 4/17.    Given clinical improvement, would be low risk from ID perspective for chemo today  LETY (acute kidney injury) (HCC)  Likely multifactorial.  Fluctuating.    Follow creatinine closely and dose-adjust antibiotics as indicated  Type 2 diabetes mellitus with hyperglycemia, without long-term current use of insulin (HCC)  Lab Results   Component Value Date    HGBA1C 6.6 (H) 02/22/2025   Risk factor for infection  Constipation  Risk factor for bacteremia.  Encephalopathy  Multifactorial due to underlying CNS lymphoma, medications, sepsis and infection as above.  Stable.      I will reassess the patient on Monday 5/5.  Please call in the interim with new questions.    Antibiotics:  Ceftriaxone #4    Subjective   Patient has  no fever, chills, sweats; no nausea, vomiting, diarrhea; no cough, shortness of breath; no pain. No new symptoms.    Objective :  Temp:  [95 °F (35 °C)-98.7 °F (37.1 °C)] 97.9 °F (36.6 °C)  HR:  [56-64] 61  BP: (124-137)/(59-80) 136/66  Resp:  [17-18] 18  SpO2:  [94 %-97 %] 96 %  O2 Device: None (Room air)    General:  No acute distress  Psychiatric:  Awake and alert, reading a magazine  Pulmonary:  Normal respiratory excursion without accessory muscle use  Abdomen:  Soft, nontender  Extremities:  No edema  Skin:  No rashes      Lab Results: I have reviewed the following results:  Results from last 7 days   Lab Units 05/02/25  0544 05/01/25  0612 04/30/25  1144   WBC Thousand/uL 3.51*  3.51* 4.62 5.69   HEMOGLOBIN g/dL 10.1*  10.1* 10.2* 11.3*   PLATELETS Thousands/uL 110*  110* 77* 67*     Results from last 7 days   Lab Units 05/02/25  0544 05/01/25  0612 04/30/25  1304 04/29/25  0400 04/28/25  0721 04/27/25  0524 04/26/25  0551   SODIUM mmol/L 137 134* 141 143   < > 142 144   POTASSIUM mmol/L 4.0 5.3 4.4 4.1   < > 3.8 3.9   CHLORIDE mmol/L 102 100 102 103   < > 106 107   CO2 mmol/L 31 28 28 28   < > 30 29   BUN mg/dL 32* 42* 49* 46*   < > 42* 45*   CREATININE mg/dL 1.37* 1.47* 1.74* 2.04*   < > 1.60* 1.55*   EGFR ml/min/1.73sq m 53 49 40 33   < > 44 46   CALCIUM mg/dL 8.4 8.2* 9.1 9.6   < > 9.5 9.4   AST U/L  --   --   --  15  --  23 39   ALT U/L  --   --   --  62*  --  83* 101*   ALK PHOS U/L  --   --   --  64  --  46 41   ALBUMIN g/dL  --   --   --  3.5  --  3.1* 3.3*    < > = values in this interval not displayed.     Results from last 7 days   Lab Units 04/29/25  1448 04/29/25  0341   BLOOD CULTURE   --  No Growth at 72 hrs.  Escherichia coli*   GRAM STAIN RESULT   --  Gram negative rods*   URINE CULTURE  >100,000 cfu/ml Escherichia coli*  --      Results from last 7 days   Lab Units 04/29/25  0406   PROCALCITONIN ng/ml 3.93*

## 2025-05-02 NOTE — ASSESSMENT & PLAN NOTE
"Patient with development of worsening confusion, recently seen at the [facility] ED on 3/24/2025 with CT head showing brain lesion   MRI of the brain 3/26/2025 concerning for \"multiple scattered areas of subependymoma nodular enhancement throughout ventricles with mild hydrocephalus left worse than right, scattered nodular areas of enhancement in left anterior inferior basal ganglia involving left anterior commissure, and smaller foci of nodular enhancement along anteromedial aspect of the left cerebral peduncle and left quirino.\"  With brain compression  (minimal left to right shift), mild hydrocephalus as evidenced by imaging  S/p LP on 3/31. 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  Lymphoma/leukemia panel nondiagnostic  CT head on 4/3 with interval increase in ventricular caliber concerning for worsening hydrocephalus. Repeat LP on 4/7 was again undiagnostic.   MRI of the brain from 4/11-\"Interval enlargement of the dominant left anterior basal ganglionic mass lesion with greater surrounding vasogenic edema and mass effect. 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  S/p EVD, self removed over 4/26 weekend  Completed keppra 500mg BID x 7 days for postoperative seizure ppx per neurosurgery  Patient started on chemotherapy while inpatient.  Oncology input reviewed, plan for every 2 weeks methotrexate (cycle 1 received on 4/18, cycle 2 planned for 5/2) and weekly rituximab for 8 weeks (received on 4/23)   Steroid taper restarted as previous was faster than recommended, oncology started dexamethasone 4 mg 3 times daily with plan to decrease to 4 mg twice daily, then 2 mg twice daily followed by 2 mg daily and then 1 mg daily.  Decrease dose every 4 days  Patient evaluated by [facility] who accepted patient to receive therapy while receiving weekly oncologic " treatments as above.

## 2025-05-02 NOTE — CONSULTS
Progress Note - Oncology-Medical   Name: Alexis Dennison 64 y.o. male I MRN: [id number]  Unit/Bed#: [facility] 904-01 I Date of Admission: 3/29/2025   Date of Service: 5/2/2025 I Hospital Day: 34    Assessment & Plan  Primary CNS lymphoma  64 yoM with PMHx of DM2, NAFLD, and HT who presented with progressive encephalopathy found to have multiple scattered nodular cerebral and cerebellar enhancement who underwent two non-diagnostic LPs prompting stereotactic brain biopsy (4/14) which showed large B-cell lymphoma.  See oncology progress note on 4/21 for full diagnostic work up.  On 4/29, agitation led to infectious work up with showed UA+ and BCx with 2/2 E. Coli sensitive to ceftriaxone.  ID was consulted is managing and has approved to restart chemo 5/2.    Plan:  High-dose methotrexate (3 g/m²) every 2 weeks (C1 received 4/18, C2 planned 5/1 but delayed for bacteremia until 5/2), may consider regimen with every 4 week administration which would be more conducive to rehab, C2D1 ok to treat 5/2  New PICC has been placed  UA Q6H to assess alkalinization of urine (goal urine pH>7.0)  IVF: HCO3 100 mEq at 150 mL/hr  Methotrexate levels every 24 hours beginning 24 hours from the start of his infusion  Rituximab (375 mg/m²) weekly x8 weeks  Re-consulted Nephrology given LETY with first cycle, appreciate recs  Final path is pending from biopsy (4/14)  E coli bacteremia  Patient met sepsis criteria on 4/29 with accompanying confusion and agitation.  UA was positive, UCx and BCx showed E. Coli sensitive to cephalosporins.  ID was consulted, is managing, and has approved to restarting chemo as early as 5/2.  Pulmonary nodule  CTA 3/29 with nonspecific 4 mm RLL pulm nodule, recommendation for 3 month follow up  Liver lesion  CTA 3/29 with nonspecific 12mm hypodense right hepatic lesion, recommended MRI abdomen  MRI 3/30 with no suspicious hepatic lesions, demonstrated simple right hepatic lobe cyst    Subjective   NAEON, patient had  "PICC placed, continues on 1:1, AAOx1-2, increasingly clear speech though thought process and expression not always clear, UpH is therapeutic to begin treatment    Objective :  Temp:  [95 °F (35 °C)-98.7 °F (37.1 °C)] 97.5 °F (36.4 °C)  HR:  [56-64] 58  BP: (124-137)/(59-80) 137/80  Resp:  [17-18] 18  SpO2:  [94 %-97 %] 95 %  O2 Device: None (Room air)    Physical Exam  General: AAOx1-2, well-appearing, confused, no acute distress, 1:1  HEENT: well-healing surgical scars, no ventricular drain, PERRLA, moist mucosa  Respiratory: CTAB w/o wheezes, rales, or rhonchi, no increased work of breathing  Cardiovascular: regular rhythm, bradycardia, w/o murmurs, 2+ radial and pedal pulses, no b/l LE edema  Abdomen: soft, non-tender, non-distended, no hepatomegaly or splenomegaly  /Rectal: deferred  Musculoskeletal: moves all four extremities with normal strength and ROM  Integumentary: warm, dry, no rash or bruising  Neurological: confused, reduced coordination  Psychiatric: pleasant and cooperative with normal mood, affect, and cognition      Lab Results: I have reviewed the following results:CBC/BMP:   .     05/02/25  0544   WBC 3.51*  3.51*   HGB 10.1*  10.1*   HCT 28.2*  28.2*   *  110*   SODIUM 137   K 4.0      CO2 31   BUN 32*   CREATININE 1.37*   GLUC 158*    , PTT/INR:No new results in last 24 hours. , Blood Culture: No results found for: \"BLOODCX\", Urinalysis:   Lab Results   Component Value Date    COLORU Light Yellow 05/02/2025    CLARITYU Clear 05/02/2025    SPECGRAV 1.010 05/02/2025    PHUR 8.0 05/02/2025    LEUKOCYTESUR Negative 05/02/2025    NITRITE Negative 05/02/2025    GLUCOSEU Negative 05/02/2025    KETONESU Negative 05/02/2025    BILIRUBINUR Negative 05/02/2025    BLOODU Negative 05/02/2025   , Urine Culture: No results found for: \"URINECX\"  Lab Results   Component Value Date    K 4.0 05/02/2025     05/02/2025    CO2 31 05/02/2025    BUN 32 (H) 05/02/2025    CREATININE 1.37 (H) " 05/02/2025    GLUF 131 (H) 02/22/2025    CALCIUM 8.4 05/02/2025    CORRECTEDCA 10.2 (H) 04/27/2025    AST 15 04/29/2025    ALT 62 (H) 04/29/2025    ALKPHOS 64 04/29/2025    EGFR 53 05/02/2025     Lab Results   Component Value Date    WBC 3.51 (L) 05/02/2025    WBC 3.51 (L) 05/02/2025    HGB 10.1 (L) 05/02/2025    HGB 10.1 (L) 05/02/2025    HCT 28.2 (L) 05/02/2025    HCT 28.2 (L) 05/02/2025    MCV 87 05/02/2025    MCV 87 05/02/2025     (L) 05/02/2025     (L) 05/02/2025     Lab Results   Component Value Date    NEUTROABS 2.51 05/02/2025     Imaging Results Review: No pertinent imaging studies reviewed.  Other Study Results Review: No additional pertinent studies reviewed.    Administrative Statements   I have spent a total time of 45 minutes in caring for this patient on the day of the visit/encounter including Diagnostic results, Prognosis, Risks and benefits of tx options, Instructions for management, Patient and family education, Importance of tx compliance, Risk factor reductions, Impressions, Counseling / Coordination of care, Documenting in the medical record, Reviewing/placing orders in the medical record (including tests, medications, and/or procedures), Obtaining or reviewing history  , and Communicating with other healthcare professionals .    Additional recommendations to follow per attending, Dr. Dumont.    Bryson Garcia DO, PGY4  Hematology/Oncology Fellow

## 2025-05-02 NOTE — CASE MANAGEMENT
Case Management Progress Note    Patient name Alexis Dennison  [facility] MRN [id number]  : 1960 Date 2025       LOS (days): 34  Geometric Mean LOS (GMLOS) (days): 11.1  Days to GMLOS:-22.5        OBJECTIVE:        Current admission status: Inpatient  Preferred Pharmacy:   [id number] [place] - [address]  [address]  [facility] [address]  Phone: [phone] Fax: [phone]    Information Assurance - [facility] - [place], [place] - [address]  [address]  [facility] [address]  Phone: [phone] Fax: [phone]    [facility] - [place], [place] - [address]  [address]  [address]  [place] [address]  Phone: [phone] Fax: [phone]    Primary Care Provider: PATI Mckeon    Primary Insurance: BLUE CROSS  Secondary Insurance: CAPITAL    PROGRESS NOTE: CM updated pt's daughter via phone and informed her that insurance auth will be started for [facility].  CM will continue to follow for discharge planning needs.

## 2025-05-02 NOTE — OCCUPATIONAL THERAPY NOTE
Occupational Therapy Progress Note     Patient Name: Alexis Dennison  Today's Date: 5/2/2025  Problem List  Principal Problem:    Primary CNS lymphoma  Active Problems:    Type 2 diabetes mellitus with hyperglycemia, without long-term current use of insulin (HCC)    HTN, goal below 130/80    Mixed hyperlipidemia    Thyroid nodule    Pulmonary nodule    Liver lesion    Constipation    Ambulatory dysfunction    Encephalopathy    Goals of care, counseling/discussion    Palliative care encounter    LETY (acute kidney injury) (HCC)    Metabolic alkalosis    Sepsis (HCC)    E coli bacteremia    E. coli UTI       05/02/25 1302   OT Last Visit   OT Visit Date 05/02/25   Note Type   Note Type Treatment   Pain Assessment   Pain Assessment Tool 0-10   Pain Score No Pain   Restrictions/Precautions   Weight Bearing Precautions Per Order No   Other Precautions Impulsive;1:1;Cognitive;Chair Alarm;Bed Alarm;Multiple lines;Fall Risk   Lifestyle   Autonomy I w/ ADLs, I w/ IADLs, I w/ functional mobility and transfers   Reciprocal Relationships Wife and children   Service to Others Full time employment   Intrinsic Gratification Reading   ADL   Where Assessed Edge of bed   UB Dressing Assistance 4  Minimal Assistance   UB Dressing Deficit Setup;Thread RUE;Thread LUE;Pull around back   LB Dressing Assistance 3  Moderate Assistance   LB Dressing Deficit Setup;Verbal cueing;Supervision/safety;Increased time to complete;Thread RLE into pants;Thread LLE into pants;Pull up over hips;Fasteners   Bed Mobility   Supine to Sit 5  Supervision   Additional items HOB elevated;Bedrails;Increased time required;Verbal cues   Sit to Supine 5  Supervision   Additional items HOB elevated;Bedrails;Increased time required;Verbal cues   Additional Comments Pt supine in bed at end of OT tx session w/ all need within reach.   Transfers   Sit to Stand 5  Supervision   Additional items Increased time required;Verbal cues   Stand to Sit 5  Supervision   Additional  items Increased time required;Verbal cues   Additional Comments w/ RW for support   Functional Mobility   Functional Mobility 4  Minimal assistance   Additional Comments Pt ambulated long household distance w/ CCG (ocassional min Ax1 for safety and line management) using RW for support; several standing rest breaks due to distractability.   Additional items Rolling walker   Cognition   Overall Cognitive Status Impaired   Arousal/Participation Alert;Responsive;Cooperative   Attention Attends with cues to redirect   Orientation Level Oriented to person;Disoriented to place;Disoriented to time   Memory Decreased recall of precautions;Decreased recall of recent events;Decreased short term memory   Following Commands Follows one step commands with increased time or repetition   Comments Pt was pleasantly confused, cooperative, and willing to participate in OT tx session. Pt continues to be distractable however re-directable w/ verbal and visual cues. Pt also continues to be impulsive at times; decreased safety awareness.   Activity Tolerance   Activity Tolerance Patient tolerated treatment well   Medical Staff Made Aware RN clearance prior to session   Assessment   Assessment Pt seen for skilled OT treatment session from 1238 to 1302 w/ interventions focusing on ADL participation, activity tolerance, sitting tolerance, sitting balance, standing tolerance, standing balance, bed mobility , transfer skills, and fxnl mobility. Pt was agreeable and willing to participate in session. Pt engaged in the following tasks: min A for UB dressing and mod A for LB dressing. Pt also required S for bed mobility and transfers and min Ax1-CCG for fxnl mobility w/ RW. In comparison to previous session, pt demonstrated improvements in mobility tasks as he required less physical assistance for transfers today. Pt continues to require consistent levels of physical assistance for ADLs at this time. Pt required increased cueing for  attention/re-direction to tasks due to impaired cognition and impulsivity. Pt continues to be functioning below baseline level as occupational performance remains limited by decreased ADL status, decreased activity tolerance, decreased endurance, decreased standing tolerance, decreased standing balance, decreased transfer skills, decreased fxnl mobility, decreased safety awareness, decreased insight into deficits, and impaired cognition . From OT standpoint, recommend Level I (Maximum Resource Intensity) at time of d/c. Pt will benefit from continued OT treatment while in acute care to address deficits as defined above and maximize level of functional independence with ADLs and functional mobility. Pt supine in bed w/ all needs met at end of session.   Plan   Treatment Interventions ADL retraining;Functional transfer training;Endurance training;Cognitive reorientation;Patient/family training;Equipment evaluation/education;Compensatory technique education;Continued evaluation;Energy conservation;Activityengagement   Goal Expiration Date 05/13/25   OT Treatment Day 9   OT Frequency 2-3x/wk   Discharge Recommendation   Rehab Resource Intensity Level, OT I (Maximum Resource Intensity)   -PAC Daily Activity Inpatient   Lower Body Dressing 2   Bathing 2   Toileting 2   Upper Body Dressing 3   Grooming 3   Eating 3   Daily Activity Raw Score 15   Daily Activity Standardized Score (Calc for Raw Score >=11) 34.69   -PAC Applied Cognition Inpatient   Following a Speech/Presentation 1   Understanding Ordinary Conversation 3   Taking Medications 2   Remembering Where Things Are Placed or Put Away 2   Remembering List of 4-5 Errands 1   Taking Care of Complicated Tasks 1   Applied Cognition Raw Score 10   Applied Cognition Standardized Score 24.98       The patient's raw score on the AM-PAC Daily Activity Inpatient Short Form is 15. A raw score of less than 19 suggests the patient may benefit from discharge to post-acute  rehabilitation services. Please refer to the recommendation of the Occupational Therapist for safe discharge planning.    YAMIL Medellin, OTR/L

## 2025-05-02 NOTE — PROGRESS NOTES
Pastoral Care Progress Note             05/02/25 1400   Clinical Encounter Type   Visited With Patient   Routine Visit Follow-up      attempted numerous times to follow-up on pt and pt either asleep of with med staff.  remains available.

## 2025-05-02 NOTE — ASSESSMENT & PLAN NOTE
Status post methotrexate  Alkalinization of the urine with a bicarbonate drip for methotrexate  Continue to be confused, 1:1

## 2025-05-02 NOTE — ASSESSMENT & PLAN NOTE
Fever, HR.  Due to bacteremia, UTI.  No other appreciable source of infection.  ROS and exam otherwise benign..  Hemodynamically stable and nontoxic.  Improved.    Continue antibiotics as below  Follow temperatures closely  Recheck CBC in AM  Supportive care as per the primary service

## 2025-05-02 NOTE — ASSESSMENT & PLAN NOTE
Lab Results   Component Value Date    CREATININE 1.37 (H) 05/02/2025    CREATININE 1.47 (H) 05/01/2025    CREATININE 1.74 (H) 04/30/2025     Baseline creatinine around 0.9, peak creatinine 2.7  Patient was evaluated by nephrology during this hospital stay and given improvement of creatinine with IV fluids signed off on 4/25  Nephrology reconsulted by oncology team while patient on methotrexate  Creatinine has remained stable  Continue to monitor.  Avoid nephrotoxins

## 2025-05-02 NOTE — PROGRESS NOTES
"Progress Note - Hospitalist   Name: Alexis Dennison 64 y.o. male I MRN: [id number]  Unit/Bed#: [facility] 904-01 I Date of Admission: 3/29/2025   Date of Service: 5/2/2025 I Hospital Day: 34    Assessment & Plan  Primary CNS lymphoma  Patient with development of worsening confusion, recently seen at the [facility] ED on 3/24/2025 with CT head showing brain lesion   MRI of the brain 3/26/2025 concerning for \"multiple scattered areas of subependymoma nodular enhancement throughout ventricles with mild hydrocephalus left worse than right, scattered nodular areas of enhancement in left anterior inferior basal ganglia involving left anterior commissure, and smaller foci of nodular enhancement along anteromedial aspect of the left cerebral peduncle and left quirino.\"  With brain compression  (minimal left to right shift), mild hydrocephalus as evidenced by imaging  S/p LP on 3/31. 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  Lymphoma/leukemia panel nondiagnostic  CT head on 4/3 with interval increase in ventricular caliber concerning for worsening hydrocephalus. Repeat LP on 4/7 was again undiagnostic.   MRI of the brain from 4/11-\"Interval enlargement of the dominant left anterior basal ganglionic mass lesion with greater surrounding vasogenic edema and mass effect. 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  S/p EVD, self removed over 4/26 weekend  Completed keppra 500mg BID x 7 days for postoperative seizure ppx per neurosurgery  Patient started on chemotherapy while inpatient.  Oncology input reviewed, plan for every 2 weeks methotrexate (cycle 1 received on 4/18, cycle 2 planned for 5/2) and weekly rituximab for 8 weeks (received on 4/23)   Steroid taper restarted as previous was faster than recommended, oncology started dexamethasone 4 mg 3 times daily with plan to " decrease to 4 mg twice daily, then 2 mg twice daily followed by 2 mg daily and then 1 mg daily.  Decrease dose every 4 days  Patient evaluated by [facility] who accepted patient to receive therapy while receiving weekly oncologic treatments as above.   Encephalopathy  Multifactorial in the setting of brain mass, vasogenic edema, medication induced and hospital-acquired delirium  Continue Seroquel   On steroid taper as above  Continue delirium precautions  Patient on 1:1 for safety.  Sepsis (HCC)  As evidenced on 4/29 morning with temperature and tachycardia.  Worsening kidney function.  Lactate within normal limits  S/p 1 L bolus, continue IV fluids  Elevated procalcitonin in the setting of recent rituximab  UA with innumerable bacteria and large leukocytes  Suspect UTI versus GI pathology.  No respiratory symptoms or findings of skin infection at this time  Urine culture with E. coli  Blood culture with 1 out of 2 with E. coli  Continue IV ceftriaxone for now  E coli bacteremia  Blood cultures with 1 out of 2 with E. Coli  CT abdomen pelvis on 4/30 with no acute pathology  Continue IV ceftriaxone for now per ID  Suspect secondary to UTI  Monitor hemodynamics  Patient cleared for PICC line  E. coli UTI  As above  Constipation  Abdomen x-ray on 4/29 with large colonic stool burden without obstruction  CT abdomen pelvis on 4/30 with constipation  S/p enema and suppository  Resolved  Continue bowel regimen  Ambulate patient as able  LETY (acute kidney injury) (HCC)  Lab Results   Component Value Date    CREATININE 1.37 (H) 05/02/2025    CREATININE 1.47 (H) 05/01/2025    CREATININE 1.74 (H) 04/30/2025     Baseline creatinine around 0.9, peak creatinine 2.7  Patient was evaluated by nephrology during this hospital stay and given improvement of creatinine with IV fluids signed off on 4/25  Nephrology reconsulted by oncology team while patient on methotrexate  Creatinine has remained stable  Continue to monitor.  Avoid  nephrotoxins  Ambulatory dysfunction  Plan for acute rehab once medically stable  Fall precautions  Ambulate patient  Type 2 diabetes mellitus with hyperglycemia, without long-term current use of insulin (HCC)  Hemoglobin A1c 6.6% on 2/22/2025  PTA metformin 1000 mg daily, holding  On Lantus 10 units at bedtime with insulin sliding scale  Hypoglycemia protocol  Accu-Cheks reviewed and stable  Mixed hyperlipidemia  Continue statin  HTN, goal below 130/80  PTA losartan and hydrochlorothiazide, holding in the setting of soft BP   BP reviewed  Liver lesion  MRI obtained: No suspicious hepatic lesions.  There is a simple right hepatic lobe cyst.  Mild diffuse hepatic steatosis.  LFTs stable  Outpatient follow up advised  Thyroid nodule  Incidental findings on CT scan  Nonemergent thyroid ultrasound for follow-up in the outpatient setting  Pulmonary nodule  Imaging with 4 mm right lower lobe pulmonary nodule.  CT chest 3-month follow-up    VTE Pharmacologic Prophylaxis: VTE Score: 5 High Risk (Score >/= 5) - Pharmacological DVT Prophylaxis Ordered: heparin. Sequential Compression Devices Ordered.    Mobility:   Basic Mobility Inpatient Raw Score: 18  JH-HLM Goal: 6: Walk 10 steps or more  JH-HLM Achieved: 7: Walk 25 feet or more  JH-HLM Goal achieved. Continue to encourage appropriate mobility.    Patient Centered Rounds: I performed bedside rounds with nursing staff today.   Discussions with Specialists or Other Care Team Provider: , oncology, nephrology    Education and Discussions with Family / Patient: Updated  (wife) at bedside.  Daughter Elizabeth over the phone    Current Length of Stay: 34 day(s)  Current Patient Status: Inpatient   Certification Statement: The patient will continue to require additional inpatient hospital stay due to as above  Discharge Plan: Anticipate discharge in 24-48 hrs to rehab facility.    Code Status: Level 1 - Full Code    Subjective   No events overnight.   Patient has no complaints this morning.  Denies pain.'s been tolerating oral intake with no nausea or vomiting.  Had 1 large BM on 5/1    Objective :  Temp:  [95 °F (35 °C)-98.7 °F (37.1 °C)] 97.5 °F (36.4 °C)  HR:  [56-64] 58  BP: (124-137)/(59-80) 137/80  Resp:  [17-18] 18  SpO2:  [94 %-97 %] 95 %  O2 Device: None (Room air)    Body mass index is 23.88 kg/m².     Input and Output Summary (last 24 hours):     Intake/Output Summary (Last 24 hours) at 5/2/2025 0831  Last data filed at 5/2/2025 0532  Gross per 24 hour   Intake 2428 ml   Output 2725 ml   Net -297 ml       Physical Exam  Vitals and nursing note reviewed.   Constitutional:       General: He is not in acute distress.     Appearance: He is well-developed.   HENT:      Head: Normocephalic and atraumatic.   Eyes:      Conjunctiva/sclera: Conjunctivae normal.   Cardiovascular:      Rate and Rhythm: Normal rate and regular rhythm.   Pulmonary:      Effort: No respiratory distress.      Breath sounds: Normal breath sounds.   Musculoskeletal:         General: No swelling.      Cervical back: Neck supple.   Skin:     General: Skin is warm and dry.      Capillary Refill: Capillary refill takes less than 2 seconds.   Neurological:      Mental Status: He is alert.      Comments: Alert and oriented to person and place today   Psychiatric:         Behavior: Behavior normal.         Lines/Drains:  Lines/Drains/Airways       Active Status       Name Placement date Placement time Site Days    PICC Line 05/01/25 Left Brachial 05/01/25  1210  Brachial  less than 1                    Central Line:  Goal for removal: Will discontinue when meds requiring line are completed.               Lab Results: I have reviewed the following results:   Results from last 7 days   Lab Units 05/02/25  0544   WBC Thousand/uL 3.51*  3.51*   HEMOGLOBIN g/dL 10.1*  10.1*   HEMATOCRIT % 28.2*  28.2*   PLATELETS Thousands/uL 110*  110*   SEGS PCT % 73   LYMPHO PCT % 17   MONO PCT % 9   EOS  PCT % 0     Results from last 7 days   Lab Units 05/02/25  0544 04/30/25  1304 04/29/25  0400   SODIUM mmol/L 137   < > 143   POTASSIUM mmol/L 4.0   < > 4.1   CHLORIDE mmol/L 102   < > 103   CO2 mmol/L 31   < > 28   BUN mg/dL 32*   < > 46*   CREATININE mg/dL 1.37*   < > 2.04*   ANION GAP mmol/L 4   < > 12   CALCIUM mg/dL 8.4   < > 9.6   ALBUMIN g/dL  --   --  3.5   TOTAL BILIRUBIN mg/dL  --   --  1.49*   ALK PHOS U/L  --   --  64   ALT U/L  --   --  62*   AST U/L  --   --  15   GLUCOSE RANDOM mg/dL 158*   < > 143*    < > = values in this interval not displayed.         Results from last 7 days   Lab Units 05/02/25  0703 05/01/25  2205 05/01/25  1558 05/01/25  1048 05/01/25  0658 04/30/25  2043 04/30/25  1613 04/30/25  1124 04/30/25  0744 04/30/25  0613 04/29/25  1954 04/29/25  1622   POC GLUCOSE mg/dl 165* 238* 150* 189* 205* 260* 176* 222* 169* 191* 194* 257*         Results from last 7 days   Lab Units 04/29/25  0406 04/29/25  0357   LACTIC ACID mmol/L  --  1.6   PROCALCITONIN ng/ml 3.93*  --        Recent Cultures (last 7 days):   Results from last 7 days   Lab Units 04/29/25  1448 04/29/25  0341   BLOOD CULTURE   --  No Growth at 72 hrs.  Escherichia coli*   GRAM STAIN RESULT   --  Gram negative rods*   URINE CULTURE  >100,000 cfu/ml Escherichia coli*  --        Imaging Results Review: No pertinent imaging studies reviewed.  Other Study Results Review: No additional pertinent studies reviewed.    Last 24 Hours Medication List:     Current Facility-Administered Medications:     acetaminophen (TYLENOL) tablet 650 mg, Q6H PRN    allopurinol (ZYLOPRIM) tablet 300 mg, Daily    alteplase (CATHFLO) injection 2 mg, Q1MIN PRN    alteplase (CATHFLO) injection 2 mg, Q1MIN PRN    aluminum-magnesium hydroxide-simethicone (MAALOX) oral suspension 30 mL, Q4H PRN    atorvastatin (LIPITOR) tablet 20 mg, Daily    bisacodyl (DULCOLAX) rectal suppository 10 mg, Daily    calcium carbonate (TUMS) chewable tablet 1,000 mg, Daily  PRN    cefTRIAXone (ROCEPHIN) 2,000 mg in dextrose 5 % 50 mL IVPB, Q24H, Last Rate: 2,000 mg (05/01/25 1802)    chlorhexidine (PERIDEX) 0.12 % oral rinse 15 mL, Q12H CAIT    dexamethasone (DECADRON) injection 4 mg, Q8H CAIT    heparin (porcine) subcutaneous injection 5,000 Units, Q8H CAIT    insulin glargine (LANTUS) subcutaneous injection 10 Units 0.1 mL, HS    insulin lispro (HumALOG/ADMELOG) 100 units/mL subcutaneous injection 5-25 Units, TID AC **AND** Fingerstick Glucose (POCT), TID AC    insulin lispro (HumALOG/ADMELOG) 100 units/mL subcutaneous injection 5-25 Units, HS    melatonin tablet 6 mg, HS    OLANZapine (ZyPREXA) IM injection 5 mg, BID PRN    ondansetron (ZOFRAN) injection 4 mg, Q6H PRN    oxyCODONE (ROXICODONE) split tablet 2.5 mg, Q4H PRN **OR** oxyCODONE (ROXICODONE) IR tablet 5 mg, Q4H PRN    pantoprazole (PROTONIX) EC tablet 40 mg, Early Morning    QUEtiapine (SEROquel) tablet 12.5 mg, Daily    QUEtiapine (SEROquel) tablet 50 mg, HS    senna (SENOKOT) tablet 17.2 mg, HS    sodium bicarbonate 100 mEq in dextrose 5 % 1,000 mL infusion, Continuous, Last Rate: 100 mL/hr (05/02/25 0220)    Administrative Statements   Today, Patient Was Seen By: Dionne Huang MD      **Please Note: This note may have been constructed using a voice recognition system.**

## 2025-05-03 LAB
ANION GAP SERPL CALCULATED.3IONS-SCNC: 7 MMOL/L (ref 4–13)
ARTERIAL PATENCY WRIST A: NO
BACTERIA UR QL AUTO: ABNORMAL /HPF
BASE EX.OXY STD BLDV CALC-SCNC: 79.8 % (ref 60–80)
BASE EXCESS BLDV CALC-SCNC: 3.4 MMOL/L
BILIRUB UR QL STRIP: NEGATIVE
BUN SERPL-MCNC: 30 MG/DL (ref 5–25)
CA-I BLD-SCNC: 1.09 MMOL/L (ref 1.12–1.32)
CALCIUM SERPL-MCNC: 7.8 MG/DL (ref 8.4–10.2)
CHLORIDE SERPL-SCNC: 103 MMOL/L (ref 96–108)
CLARITY UR: CLEAR
CO2 SERPL-SCNC: 30 MMOL/L (ref 21–32)
COLOR UR: ABNORMAL
COLOR UR: YELLOW
CREAT SERPL-MCNC: 1.52 MG/DL (ref 0.6–1.3)
ERYTHROCYTE [DISTWIDTH] IN BLOOD BY AUTOMATED COUNT: 11.7 % (ref 11.6–15.1)
GFR SERPL CREATININE-BSD FRML MDRD: 47 ML/MIN/1.73SQ M
GLUCOSE SERPL-MCNC: 183 MG/DL (ref 65–140)
GLUCOSE SERPL-MCNC: 189 MG/DL (ref 65–140)
GLUCOSE SERPL-MCNC: 219 MG/DL (ref 65–140)
GLUCOSE SERPL-MCNC: 244 MG/DL (ref 65–140)
GLUCOSE SERPL-MCNC: 288 MG/DL (ref 65–140)
GLUCOSE UR STRIP-MCNC: ABNORMAL MG/DL
HCO3 BLDV-SCNC: 26.8 MMOL/L (ref 24–30)
HCT VFR BLD AUTO: 26.1 % (ref 36.5–49.3)
HGB BLD-MCNC: 9.4 G/DL (ref 12–17)
HGB UR QL STRIP.AUTO: ABNORMAL
HGB UR QL STRIP.AUTO: NEGATIVE
KETONES UR STRIP-MCNC: NEGATIVE MG/DL
LEUKOCYTE ESTERASE UR QL STRIP: ABNORMAL
LEUKOCYTE ESTERASE UR QL STRIP: NEGATIVE
MCH RBC QN AUTO: 31.2 PG (ref 26.8–34.3)
MCHC RBC AUTO-ENTMCNC: 36 G/DL (ref 31.4–37.4)
MCV RBC AUTO: 87 FL (ref 82–98)
NITRITE UR QL STRIP: NEGATIVE
NON-SQ EPI CELLS URNS QL MICRO: ABNORMAL /HPF
O2 CT BLDV-SCNC: 11.6 ML/DL
PCO2 BLDV: 36.3 MM HG (ref 42–50)
PH BLDV: 7.49 [PH] (ref 7.3–7.4)
PH UR STRIP.AUTO: 7.5 [PH]
PH UR STRIP.AUTO: 7.5 [PH]
PH UR STRIP.AUTO: 8 [PH]
PH UR STRIP.AUTO: 8 [PH]
PHOSPHATE SERPL-MCNC: 2.7 MG/DL (ref 2.3–4.1)
PLATELET # BLD AUTO: 146 THOUSANDS/UL (ref 149–390)
PMV BLD AUTO: 11.1 FL (ref 8.9–12.7)
PO2 BLDV: 46.2 MM HG (ref 35–45)
POTASSIUM SERPL-SCNC: 3.7 MMOL/L (ref 3.5–5.3)
PROT UR STRIP-MCNC: ABNORMAL MG/DL
PROT UR STRIP-MCNC: ABNORMAL MG/DL
PROT UR STRIP-MCNC: NEGATIVE MG/DL
PROT UR STRIP-MCNC: NEGATIVE MG/DL
RBC # BLD AUTO: 3.01 MILLION/UL (ref 3.88–5.62)
RBC #/AREA URNS AUTO: ABNORMAL /HPF
SODIUM SERPL-SCNC: 140 MMOL/L (ref 135–147)
SP GR UR STRIP.AUTO: 1.01 (ref 1–1.03)
URATE SERPL-MCNC: 2.1 MG/DL (ref 3.5–8.5)
UROBILINOGEN UR STRIP-ACNC: 2 MG/DL
UROBILINOGEN UR STRIP-ACNC: <2 MG/DL
WBC # BLD AUTO: 3.01 THOUSAND/UL (ref 4.31–10.16)
WBC #/AREA URNS AUTO: ABNORMAL /HPF

## 2025-05-03 PROCEDURE — 80048 BASIC METABOLIC PNL TOTAL CA: CPT | Performed by: INTERNAL MEDICINE

## 2025-05-03 PROCEDURE — 99233 SBSQ HOSP IP/OBS HIGH 50: CPT | Performed by: INTERNAL MEDICINE

## 2025-05-03 PROCEDURE — 82330 ASSAY OF CALCIUM: CPT | Performed by: INTERNAL MEDICINE

## 2025-05-03 PROCEDURE — 99232 SBSQ HOSP IP/OBS MODERATE 35: CPT | Performed by: INTERNAL MEDICINE

## 2025-05-03 PROCEDURE — 81001 URINALYSIS AUTO W/SCOPE: CPT

## 2025-05-03 PROCEDURE — G0545 PR INHERENT VISIT TO INPT: HCPCS | Performed by: INTERNAL MEDICINE

## 2025-05-03 PROCEDURE — 81001 URINALYSIS AUTO W/SCOPE: CPT | Performed by: STUDENT IN AN ORGANIZED HEALTH CARE EDUCATION/TRAINING PROGRAM

## 2025-05-03 PROCEDURE — 85027 COMPLETE CBC AUTOMATED: CPT | Performed by: STUDENT IN AN ORGANIZED HEALTH CARE EDUCATION/TRAINING PROGRAM

## 2025-05-03 PROCEDURE — 82805 BLOOD GASES W/O2 SATURATION: CPT | Performed by: INTERNAL MEDICINE

## 2025-05-03 PROCEDURE — 84100 ASSAY OF PHOSPHORUS: CPT | Performed by: INTERNAL MEDICINE

## 2025-05-03 PROCEDURE — 80204 DRUG ASSAY METHOTREXATE: CPT | Performed by: STUDENT IN AN ORGANIZED HEALTH CARE EDUCATION/TRAINING PROGRAM

## 2025-05-03 PROCEDURE — 84550 ASSAY OF BLOOD/URIC ACID: CPT | Performed by: INTERNAL MEDICINE

## 2025-05-03 PROCEDURE — 82948 REAGENT STRIP/BLOOD GLUCOSE: CPT

## 2025-05-03 PROCEDURE — 99233 SBSQ HOSP IP/OBS HIGH 50: CPT | Performed by: STUDENT IN AN ORGANIZED HEALTH CARE EDUCATION/TRAINING PROGRAM

## 2025-05-03 RX ORDER — INSULIN LISPRO 100 [IU]/ML
5 INJECTION, SOLUTION INTRAVENOUS; SUBCUTANEOUS
Status: DISCONTINUED | OUTPATIENT
Start: 2025-05-03 | End: 2025-05-05

## 2025-05-03 RX ORDER — SODIUM CHLORIDE 9 MG/ML
20 INJECTION, SOLUTION INTRAVENOUS ONCE AS NEEDED
Status: DISCONTINUED | OUTPATIENT
Start: 2025-05-03 | End: 2025-05-07 | Stop reason: HOSPADM

## 2025-05-03 RX ORDER — DIPHENHYDRAMINE HYDROCHLORIDE 50 MG/ML
25 INJECTION, SOLUTION INTRAMUSCULAR; INTRAVENOUS ONCE
Status: COMPLETED | OUTPATIENT
Start: 2025-05-03 | End: 2025-05-03

## 2025-05-03 RX ORDER — ACETAMINOPHEN 325 MG/1
650 TABLET ORAL ONCE
Status: COMPLETED | OUTPATIENT
Start: 2025-05-03 | End: 2025-05-03

## 2025-05-03 RX ORDER — INSULIN LISPRO 100 [IU]/ML
2-12 INJECTION, SOLUTION INTRAVENOUS; SUBCUTANEOUS
Status: DISCONTINUED | OUTPATIENT
Start: 2025-05-03 | End: 2025-05-07 | Stop reason: HOSPADM

## 2025-05-03 RX ADMIN — INSULIN LISPRO 4 UNITS: 100 INJECTION, SOLUTION INTRAVENOUS; SUBCUTANEOUS at 18:42

## 2025-05-03 RX ADMIN — HEPARIN SODIUM 5000 UNITS: 5000 INJECTION INTRAVENOUS; SUBCUTANEOUS at 13:02

## 2025-05-03 RX ADMIN — QUETIAPINE FUMARATE 12.5 MG: 25 TABLET ORAL at 11:39

## 2025-05-03 RX ADMIN — INSULIN LISPRO 5 UNITS: 100 INJECTION, SOLUTION INTRAVENOUS; SUBCUTANEOUS at 08:33

## 2025-05-03 RX ADMIN — HEPARIN SODIUM 5000 UNITS: 5000 INJECTION INTRAVENOUS; SUBCUTANEOUS at 21:13

## 2025-05-03 RX ADMIN — LEUCOVORIN CALCIUM 25 MG: 50 INJECTION, POWDER, LYOPHILIZED, FOR SOLUTION INTRAMUSCULAR; INTRAVENOUS at 21:12

## 2025-05-03 RX ADMIN — CEFAZOLIN SODIUM 2000 MG: 2 SOLUTION INTRAVENOUS at 11:38

## 2025-05-03 RX ADMIN — DEXAMETHASONE 4 MG: 4 TABLET ORAL at 05:35

## 2025-05-03 RX ADMIN — INSULIN LISPRO 6 UNITS: 100 INJECTION, SOLUTION INTRAVENOUS; SUBCUTANEOUS at 20:17

## 2025-05-03 RX ADMIN — PANTOPRAZOLE SODIUM 40 MG: 40 TABLET, DELAYED RELEASE ORAL at 05:35

## 2025-05-03 RX ADMIN — SODIUM BICARBONATE 100 ML/HR: 84 INJECTION INTRAVENOUS at 22:52

## 2025-05-03 RX ADMIN — DEXAMETHASONE 4 MG: 4 TABLET ORAL at 13:02

## 2025-05-03 RX ADMIN — INSULIN LISPRO 10 UNITS: 100 INJECTION, SOLUTION INTRAVENOUS; SUBCUTANEOUS at 13:00

## 2025-05-03 RX ADMIN — INSULIN GLARGINE 10 UNITS: 100 INJECTION, SOLUTION SUBCUTANEOUS at 20:17

## 2025-05-03 RX ADMIN — CEFAZOLIN SODIUM 2000 MG: 2 SOLUTION INTRAVENOUS at 18:47

## 2025-05-03 RX ADMIN — DEXAMETHASONE 4 MG: 4 TABLET ORAL at 21:13

## 2025-05-03 RX ADMIN — RITUXIMAB 800 MG: 10 INJECTION, SOLUTION INTRAVENOUS at 13:18

## 2025-05-03 RX ADMIN — CEFAZOLIN SODIUM 2000 MG: 2 SOLUTION INTRAVENOUS at 03:17

## 2025-05-03 RX ADMIN — QUETIAPINE FUMARATE 50 MG: 25 TABLET ORAL at 21:13

## 2025-05-03 RX ADMIN — HEPARIN SODIUM 5000 UNITS: 5000 INJECTION INTRAVENOUS; SUBCUTANEOUS at 05:35

## 2025-05-03 RX ADMIN — ACETAMINOPHEN 650 MG: 325 TABLET, FILM COATED ORAL at 11:38

## 2025-05-03 RX ADMIN — CHLORHEXIDINE GLUCONATE 0.12% ORAL RINSE 15 ML: 1.2 LIQUID ORAL at 08:32

## 2025-05-03 RX ADMIN — LEUCOVORIN CALCIUM 25 MG: 50 INJECTION, POWDER, LYOPHILIZED, FOR SOLUTION INTRAMUSCULAR; INTRAVENOUS at 15:56

## 2025-05-03 RX ADMIN — INSULIN LISPRO 5 UNITS: 100 INJECTION, SOLUTION INTRAVENOUS; SUBCUTANEOUS at 18:43

## 2025-05-03 RX ADMIN — ALLOPURINOL 300 MG: 300 TABLET ORAL at 08:32

## 2025-05-03 RX ADMIN — Medication 6 MG: at 20:17

## 2025-05-03 RX ADMIN — DIPHENHYDRAMINE HYDROCHLORIDE 25 MG: 50 INJECTION, SOLUTION INTRAMUSCULAR; INTRAVENOUS at 11:39

## 2025-05-03 RX ADMIN — ATORVASTATIN CALCIUM 20 MG: 20 TABLET, FILM COATED ORAL at 08:32

## 2025-05-03 RX ADMIN — SODIUM BICARBONATE 100 ML/HR: 84 INJECTION INTRAVENOUS at 00:39

## 2025-05-03 RX ADMIN — CHLORHEXIDINE GLUCONATE 0.12% ORAL RINSE 15 ML: 1.2 LIQUID ORAL at 20:17

## 2025-05-03 NOTE — ASSESSMENT & PLAN NOTE
PTA losartan and hydrochlorothiazide, held on admission  BP reviewed and acceptable without medications

## 2025-05-03 NOTE — PROGRESS NOTES
"Progress Note - Hospitalist   Name: Alexis Dennison 64 y.o. male I MRN: [id number]  Unit/Bed#: [facility] 904-01 I Date of Admission: 3/29/2025   Date of Service: 5/3/2025 I Hospital Day: 35    Assessment & Plan  Primary CNS lymphoma  Patient with development of worsening confusion, recently seen at the [facility] ED on 3/24/2025 with CT head showing brain lesion   MRI of the brain 3/26/2025 concerning for \"multiple scattered areas of subependymoma nodular enhancement throughout ventricles with mild hydrocephalus left worse than right, scattered nodular areas of enhancement in left anterior inferior basal ganglia involving left anterior commissure, and smaller foci of nodular enhancement along anteromedial aspect of the left cerebral peduncle and left quirino.\"  With brain compression  (minimal left to right shift), mild hydrocephalus as evidenced by initial imaging  S/p LP on 3/31. 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  Lymphoma/leukemia panel nondiagnostic  CT head on 4/3 with interval increase in ventricular caliber concerning for worsening hydrocephalus. Repeat LP on 4/7 was again undiagnostic.   MRI of the brain from 4/11-\"Interval enlargement of the dominant left anterior basal ganglionic mass lesion with greater surrounding vasogenic edema and mass effect. 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  S/p EVD, self removed over 4/26 weekend  Completed keppra 500mg BID x 7 days for postoperative seizure ppx per neurosurgery  Patient started on chemotherapy while inpatient.  Discussed with oncology and plan for rituximab infusion weekly and methotrexate every 2 weeks.  Patient will require daily serum methotrexate levels until less than 0.1.  Also recommend dexamethasone taper as follows:4 mg Q8H through 5/5, then 4 mg BID x4 days, then 2 mg BID x4 days, then " 1 mg BID x4 days, then 1 mg daily x4 days, then discontinue.  Maintain PICC line  Patient medically stable for rehab.  Insurance Auth obtained, awaiting bed  Encephalopathy  Multifactorial in the setting of brain mass, vasogenic edema, medication induced and hospital-acquired delirium  Continue Seroquel   On steroid taper as above  Continue delirium precautions  Patient on 1:1 for safety  Sepsis (HCC)  As evidenced on 4/29 morning with temperature and tachycardia.  Worsening kidney function.  Lactate within normal limits  S/p 1 L bolus, continue IV fluids  Elevated procalcitonin in the setting of recent rituximab  Secondary to UTI, bacteremia  UA with innumerable bacteria and large leukocytes  Urine culture with E. coli  Blood culture with 1 out of 2 with E. coli  On IV antibiotics per ID through 5/5  E coli bacteremia  Blood cultures with 1 out of 2 with E. Coli  CT abdomen pelvis on 4/30 with no acute pathology  On IV antibiotics per ID through 5/5  E. coli UTI  As above  Constipation  Abdomen x-ray on 4/29 with large colonic stool burden without obstruction  CT abdomen pelvis on 4/30 with constipation  S/p enema and suppository  Resolved  Continue bowel regimen  Ambulate patient as able  LETY (acute kidney injury) ([facility])  Lab Results   Component Value Date    CREATININE 1.52 (H) 05/03/2025    CREATININE 1.37 (H) 05/02/2025    CREATININE 1.47 (H) 05/01/2025     Baseline creatinine around 0.9, peak creatinine 2.7  Patient was evaluated by nephrology during this hospital stay and given improvement of creatinine with IV fluids signed off on 4/25.  Reconsulted on 5/2 for monitoring post methotrexate  Creatinine has remained stable  Continue to monitor.  Avoid nephrotoxins  Ambulatory dysfunction  Plan for acute rehab  Fall precautions  Ambulate patient  Type 2 diabetes mellitus with hyperglycemia, without long-term current use of insulin ([facility])  Hemoglobin A1c 6.6% on 2/22/2025  PTA metformin 1000 mg daily, holding  On  Lantus 10 units at bedtime   Accu-Cheks reviewed and prandial glucose above goal  Hyperglycemic secondary to steroid use  Start lispro 5 units 3 times daily, insulin sliding scale  Hypoglycemia protocol  Mixed hyperlipidemia  Continue statin  HTN, goal below 130/80  PTA losartan and hydrochlorothiazide, held on admission  BP reviewed and acceptable without medications  Liver lesion  MRI obtained: No suspicious hepatic lesions.  There is a simple right hepatic lobe cyst.  Mild diffuse hepatic steatosis.  LFTs stable  Outpatient follow up advised  Thyroid nodule  Incidental findings on CT scan  Nonemergent thyroid ultrasound for follow-up in the outpatient setting  Pulmonary nodule  Imaging with 4 mm right lower lobe pulmonary nodule.  CT chest 3-month follow-up    VTE Pharmacologic Prophylaxis: VTE Score: 5 High Risk (Score >/= 5) - Pharmacological DVT Prophylaxis Ordered: heparin. Sequential Compression Devices Ordered.    Mobility:   Basic Mobility Inpatient Raw Score: 18  JH-HLM Goal: 6: Walk 10 steps or more  JH-HLM Achieved: 7: Walk 25 feet or more  JH-HLM Goal achieved. Continue to encourage appropriate mobility.    Patient Centered Rounds: I performed bedside rounds with nursing staff today.   Discussions with Specialists or Other Care Team Provider: , oncology    Education and Discussions with Family / Patient: Updated  (daughter) via phone.    Current Length of Stay: 35 day(s)  Current Patient Status: Inpatient   Certification Statement: The patient will continue to require additional inpatient hospital stay due to as above  Discharge Plan: Anticipate discharge in 24-48 hrs to rehab facility.    Code Status: Level 1 - Full Code    Subjective   No events overnight.  Patient has no complaints this morning.  Tolerating oral intake with no nausea or vomiting.    Objective :  Temp:  [97.9 °F (36.6 °C)-98.4 °F (36.9 °C)] 98.4 °F (36.9 °C)  HR:  [55-72] 59  BP: (122-149)/(53-82)  149/82  Resp:  [17-18] 17  SpO2:  [94 %-97 %] 97 %  O2 Device: None (Room air)    Body mass index is 23.88 kg/m².     Input and Output Summary (last 24 hours):     Intake/Output Summary (Last 24 hours) at 5/3/2025 0954  Last data filed at 5/3/2025 0900  Gross per 24 hour   Intake 5176.67 ml   Output 1950 ml   Net 3226.67 ml       Physical Exam  Vitals and nursing note reviewed.   Constitutional:       General: He is not in acute distress.     Appearance: He is well-developed.   HENT:      Head: Normocephalic and atraumatic.   Eyes:      Conjunctiva/sclera: Conjunctivae normal.   Cardiovascular:      Rate and Rhythm: Normal rate and regular rhythm.   Pulmonary:      Effort: No respiratory distress.   Musculoskeletal:         General: No swelling.      Cervical back: Neck supple.   Skin:     General: Skin is warm and dry.      Capillary Refill: Capillary refill takes less than 2 seconds.   Neurological:      Mental Status: He is alert. He is disoriented.      Comments: Patient knows he is in the hospital, his name.  Reports it is April and does not recall the year   Psychiatric:         Mood and Affect: Mood normal.         Behavior: Behavior normal.         Lines/Drains:  Lines/Drains/Airways       Active Status       Name Placement date Placement time Site Days    PICC Line 05/01/25 Left Brachial 05/01/25  1210  Brachial  1    External Urinary Catheter 05/03/25  0206  -- less than 1                    Central Line:  Goal for removal: Will discontinue when meds requiring line are completed.               Lab Results: I have reviewed the following results:   Results from last 7 days   Lab Units 05/02/25  0544   WBC Thousand/uL 3.51*  3.51*   HEMOGLOBIN g/dL 10.1*  10.1*   HEMATOCRIT % 28.2*  28.2*   PLATELETS Thousands/uL 110*  110*   SEGS PCT % 73   LYMPHO PCT % 17   MONO PCT % 9   EOS PCT % 0     Results from last 7 days   Lab Units 05/03/25  0521 04/30/25  1304 04/29/25  0400   SODIUM mmol/L 140   < > 143    POTASSIUM mmol/L 3.7   < > 4.1   CHLORIDE mmol/L 103   < > 103   CO2 mmol/L 30   < > 28   BUN mg/dL 30*   < > 46*   CREATININE mg/dL 1.52*   < > 2.04*   ANION GAP mmol/L 7   < > 12   CALCIUM mg/dL 7.8*   < > 9.6   ALBUMIN g/dL  --   --  3.5   TOTAL BILIRUBIN mg/dL  --   --  1.49*   ALK PHOS U/L  --   --  64   ALT U/L  --   --  62*   AST U/L  --   --  15   GLUCOSE RANDOM mg/dL 183*   < > 143*    < > = values in this interval not displayed.         Results from last 7 days   Lab Units 05/03/25  0730 05/02/25  2101 05/02/25  1624 05/02/25  1035 05/02/25  0703 05/01/25  2205 05/01/25  1558 05/01/25  1048 05/01/25  0658 04/30/25  2043 04/30/25  1613 04/30/25  1124   POC GLUCOSE mg/dl 189* 306* 165* 268* 165* 238* 150* 189* 205* 260* 176* 222*         Results from last 7 days   Lab Units 04/29/25  0406 04/29/25  0357   LACTIC ACID mmol/L  --  1.6   PROCALCITONIN ng/ml 3.93*  --        Recent Cultures (last 7 days):   Results from last 7 days   Lab Units 04/29/25  1448 04/29/25  0341   BLOOD CULTURE   --  No Growth After 4 Days.  Escherichia coli*   GRAM STAIN RESULT   --  Gram negative rods*   URINE CULTURE  >100,000 cfu/ml Escherichia coli*  --        Imaging Results Review: No pertinent imaging studies reviewed.  Other Study Results Review: No additional pertinent studies reviewed.    Last 24 Hours Medication List:     Current Facility-Administered Medications:     acetaminophen (TYLENOL) tablet 650 mg, Q6H PRN    allopurinol (ZYLOPRIM) tablet 300 mg, Daily    alteplase (CATHFLO) injection 2 mg, Q1MIN PRN    alteplase (CATHFLO) injection 2 mg, Q1MIN PRN    alteplase (CATHFLO) injection 2 mg, Q1MIN PRN    aluminum-magnesium hydroxide-simethicone (MAALOX) oral suspension 30 mL, Q4H PRN    atorvastatin (LIPITOR) tablet 20 mg, Daily    bisacodyl (DULCOLAX) rectal suppository 10 mg, Daily    calcium carbonate (TUMS) chewable tablet 1,000 mg, Daily PRN    ceFAZolin (ANCEF) IVPB (premix in dextrose) 2,000 mg 50 mL, Q8H,  Last Rate: 2,000 mg (05/03/25 0317)    chlorhexidine (PERIDEX) 0.12 % oral rinse 15 mL, Q12H CAIT    dexamethasone (DECADRON) tablet 4 mg, Q8H CAIT    heparin (porcine) subcutaneous injection 5,000 Units, Q8H CAIT    insulin glargine (LANTUS) subcutaneous injection 10 Units 0.1 mL, HS    insulin lispro (HumALOG/ADMELOG) 100 units/mL subcutaneous injection 5-25 Units, TID AC **AND** Fingerstick Glucose (POCT), TID AC    insulin lispro (HumALOG/ADMELOG) 100 units/mL subcutaneous injection 5-25 Units, HS    melatonin tablet 6 mg, HS    OLANZapine (ZyPREXA) IM injection 5 mg, BID PRN    ondansetron (ZOFRAN) injection 4 mg, Q6H PRN    oxyCODONE (ROXICODONE) split tablet 2.5 mg, Q4H PRN **OR** oxyCODONE (ROXICODONE) IR tablet 5 mg, Q4H PRN    pantoprazole (PROTONIX) EC tablet 40 mg, Early Morning    QUEtiapine (SEROquel) tablet 12.5 mg, Daily    QUEtiapine (SEROquel) tablet 50 mg, HS    senna (SENOKOT) tablet 17.2 mg, HS    sodium bicarbonate 100 mEq in dextrose 5 % 1,000 mL infusion, Continuous, Last Rate: 100 mL/hr (05/03/25 0039)    sodium chloride 0.9 % infusion, Once PRN, Last Rate: 20 mL/hr (05/02/25 1500)    Administrative Statements   Today, Patient Was Seen By: Dionne Huang MD      **Please Note: This note may have been constructed using a voice recognition system.**

## 2025-05-03 NOTE — PLAN OF CARE
Problem: PAIN - ADULT  Goal: Verbalizes/displays adequate comfort level or baseline comfort level  Description: Interventions:- Encourage patient to monitor pain and request assistance- Assess pain using appropriate pain scale- Administer analgesics based on type and severity of pain and evaluate response- Implement non-pharmacological measures as appropriate and evaluate response- Notify physician/advanced practitioner if interventions unsuccessful or patient reports new pain  Outcome: Progressing     Problem: INFECTION - ADULT  Goal: Absence or prevention of progression during hospitalization  Description: INTERVENTIONS:- Assess and monitor for signs and symptoms of infection- Monitor lab/diagnostic results- Monitor all insertion sites, i.e. indwelling lines, tubes, and drains- [place] appropriate cooling/warming therapies per order- Administer medications as ordered- Instruct and encourage patient and family to use good hand hygiene technique- Identify and instruct in appropriate isolation precautions for identified infection/condition  Outcome: Progressing     Problem: SAFETY ADULT  Goal: Patient will remain free of falls  Description: INTERVENTIONS:- Educate patient/family on patient safety including physical limitations- Instruct patient to call for assistance with activity - Consult OT/PT to assist with strengthening/mobility - Keep Call bell within reach- Keep bed low and locked with side rails adjusted as appropriate- Keep care items and personal belongings within reach- Initiate and maintain comfort rounds- Make Fall Risk Sign visible to staff- Offer Toileting every 2 Hours, in advance of need- Initiate/Maintain bed/chair alarm- Obtain necessary fall risk management equipment.- Apply yellow socks and bracelet for high fall risk patients- Consider moving patient to room near nurses station  INTERVENTIONS:- Educate patient/family on patient safety including physical limitations- Instruct patient to call  for assistance with activity - Consult OT/PT to assist with strengthening/mobility - Keep Call bell within reach- Keep bed low and locked with side rails adjusted as appropriate- Keep care items and personal belongings within reach- Initiate and maintain comfort rounds- Make Fall Risk Sign visible to staff- Offer Toileting every 2 Hours, in advance of need- Initiate/Maintain bed alarm- Obtain necessary fall risk management equipment: bracelet, socks, alarms- Apply yellow socks and bracelet for high fall risk patients- Consider moving patient to room near nurses station  Outcome: Progressing  Goal: Maintain or return to baseline ADL function  Description: INTERVENTIONS:-  Assess patient's ability to carry out ADLs; assess patient's baseline for ADL function and identify physical deficits which impact ability to perform ADLs (bathing, care of mouth/teeth, toileting, grooming, dressing, etc.)- Assess/evaluate cause of self-care deficits - Assess range of motion- Assess patient's mobility; develop plan if impaired- Assess patient's need for assistive devices and provide as appropriate- Encourage maximum independence but intervene and supervise when necessary- Involve family in performance of ADLs- Assess for home care needs following discharge - Consider OT consult to assist with ADL evaluation and planning for discharge- Provide patient education as appropriate  Outcome: Progressing  Goal: Maintains/Returns to pre admission functional level  Description: INTERVENTIONS:- Perform AM-PAC 6 Click Basic Mobility/ Daily Activity assessment daily.- Set and communicate daily mobility goal to care team and patient/family/caregiver. - Collaborate with rehabilitation services on mobility goals if consulted- Reposition patient every 2 hours.- Dangle patient 3 times a day- Stand patient 3 times a day- Ambulate patient 3 times a day- Out of bed to chair 3 times a day - Out of bed for meals 3 times a day- Out of bed for toileting-  Record patient progress and toleration of activity level   Outcome: Progressing     Problem: DISCHARGE PLANNING  Goal: Discharge to home or other facility with appropriate resources  Description: INTERVENTIONS:- Identify barriers to discharge w/patient and caregiver- Arrange for needed discharge resources and transportation as appropriate- Identify discharge learning needs (meds, wound care, etc.)- Refer to Case Management Department for coordinating discharge planning if the patient needs post-hospital services based on physician/advanced practitioner order or complex needs related to functional status, cognitive ability, or social support system  Outcome: Progressing     Problem: Knowledge Deficit  Goal: Patient/family/caregiver demonstrates understanding of disease process, treatment plan, medications, and discharge instructions  Description: Complete learning assessment and assess knowledge base.Interventions:- Provide teaching at level of understanding- Provide teaching via preferred learning methods  Outcome: Progressing     Problem: NEUROSENSORY - ADULT  Goal: Achieves stable or improved neurological status  Description: INTERVENTIONS- Monitor and report changes in neurological status- Monitor vital signs such as temperature, blood pressure, glucose, and any other labs ordered - Initiate measures to prevent increased intracranial pressure- Monitor for seizure activity and implement precautions if appropriate    INTERVENTIONS- Monitor and report changes in neurological status- Monitor vital signs such as temperature, blood pressure, glucose, and any other labs ordered - Initiate measures to prevent increased intracranial pressure- Monitor for seizure activity and implement precautions if appropriate    Outcome: Progressing  Goal: Remains free of injury related to seizures activity  Description: INTERVENTIONS- Maintain airway, patient safety  and administer oxygen as ordered- Monitor patient for seizure  activity, document and report duration and description of seizure to physician/advanced practitioner- If seizure occurs,  ensure patient safety during seizure- Reorient patient post seizure- Seizure pads on all 4 side rails- Instruct patient/family to notify RN of any seizure activity including if an aura is experienced- Instruct patient/family to call for assistance with activity based on nursing assessment- Administer anti-seizure medications if ordered  INTERVENTIONS- Maintain airway, patient safety  and administer oxygen as ordered- Monitor patient for seizure activity, document and report duration and description of seizure to physician/advanced practitioner- If seizure occurs,  ensure patient safety during seizure- Reorient patient post seizure- Seizure pads on all 4 side rails- Instruct patient/family to notify RN of any seizure activity including if an aura is experienced- Instruct patient/family to call for assistance with activity based on nursing assessment- Administer anti-seizure medications if ordered  Outcome: Progressing  Goal: Achieves maximal functionality and self care  Description: INTERVENTIONS- Monitor swallowing and airway patency with patient fatigue and changes in neurological status- Encourage and assist patient to increase activity and self care. - Encourage visually impaired, hearing impaired and aphasic patients to use assistive/communication devices  INTERVENTIONS- Monitor swallowing and airway patency with patient fatigue and changes in neurological status- Encourage and assist patient to increase activity and self care. - Encourage visually impaired, hearing impaired and aphasic patients to use assistive/communication devices  Outcome: Progressing     Problem: METABOLIC, FLUID AND ELECTROLYTES - ADULT  Goal: Glucose maintained within target range  Description: INTERVENTIONS:- Monitor Blood Glucose as ordered- Assess for signs and symptoms of hyperglycemia and hypoglycemia- Administer  ordered medications to maintain glucose within target range- Assess nutritional intake and initiate nutrition service referral as needed  INTERVENTIONS:- Monitor Blood Glucose as ordered- Assess for signs and symptoms of hyperglycemia and hypoglycemia- Administer ordered medications to maintain glucose within target range- Assess nutritional intake and initiate nutrition service referral as needed  Outcome: Progressing  Goal: Electrolytes maintained within normal limits  Description: INTERVENTIONS:- Monitor labs and assess patient for signs and symptoms of electrolyte imbalances- Administer electrolyte replacement as ordered- Monitor response to electrolyte replacements, including repeat lab results as appropriate- Instruct patient on fluid and nutrition as appropriate  Outcome: Progressing  Goal: Fluid balance maintained  Description: INTERVENTIONS:- Monitor labs - Monitor I/O and WT- Instruct patient on fluid and nutrition as appropriate- Assess for signs & symptoms of volume excess or deficit  Outcome: Progressing     Problem: Prexisting or High Potential for Compromised Skin Integrity  Goal: Skin integrity is maintained or improved  Description: INTERVENTIONS:- Identify patients at risk for skin breakdown- Assess and monitor skin integrity- Assess and monitor nutrition and hydration status- Monitor labs - Assess for incontinence - Turn and reposition patient- Assist with mobility/ambulation- Relieve pressure over bony prominences- Avoid friction and shearing- Provide appropriate hygiene as needed including keeping skin clean and dry- Evaluate need for skin moisturizer/barrier cream- Collaborate with interdisciplinary team - Patient/family teaching- Consider wound care consult   Outcome: Progressing     Problem: SAFETY,RESTRAINT: NV/NON-SELF DESTRUCTIVE BEHAVIOR  Goal: Remains free of harm/injury (restraint for non violent/non self-detsructive behavior)  Description: INTERVENTIONS:- Instruct patient/family  regarding restraint use - Assess and monitor physiologic and psychological status - Provide interventions and comfort measures to meet assessed patient needs - Identify and implement measures to help patient regain control- Assess readiness for release of restraint   Outcome: Progressing  Goal: Returns to optimal restraint-free functioning  Description: INTERVENTIONS:- Assess the patient's behavior and symptoms that indicate continued need for restraint- Identify and implement measures to help patient regain control- Assess readiness for release of restraint   Outcome: Progressing     Problem: CARDIOVASCULAR - ADULT  Goal: Maintains optimal cardiac output and hemodynamic stability  Description: INTERVENTIONS:- Monitor I/O, vital signs and rhythm- Monitor for S/S and trends of decreased cardiac output- Administer and titrate ordered vasoactive medications to optimize hemodynamic stability- Assess quality of pulses, skin color and temperature- Assess for signs of decreased coronary artery perfusion- Instruct patient to report change in severity of symptoms  Outcome: Progressing  Goal: Absence of cardiac dysrhythmias or at baseline rhythm  Description: INTERVENTIONS:- Continuous cardiac monitoring, vital signs, obtain 12 lead EKG if ordered- Administer antiarrhythmic and heart rate control medications as ordered- Monitor electrolytes and administer replacement therapy as ordered  Outcome: Progressing     Problem: GASTROINTESTINAL - ADULT  Goal: Maintains or returns to baseline bowel function  Description: INTERVENTIONS:- Assess bowel function- Encourage oral fluids to ensure adequate hydration- Administer IV fluids if ordered to ensure adequate hydration- Administer ordered medications as needed- Encourage mobilization and activity- Consider nutritional services referral to assist patient with adequate nutrition and appropriate food choices  Outcome: Progressing  Goal: Maintains adequate nutritional  intake  Description: INTERVENTIONS:- Monitor percentage of each meal consumed- Identify factors contributing to decreased intake, treat as appropriate- Assist with meals as needed- Monitor I&O, weight, and lab values if indicated- Obtain nutrition services referral as needed  Outcome: Progressing     Problem: GENITOURINARY - ADULT  Goal: Maintains or returns to baseline urinary function  Description: INTERVENTIONS:- Assess urinary function- Encourage oral fluids to ensure adequate hydration if ordered- Administer IV fluids as ordered to ensure adequate hydration- Administer ordered medications as needed- Offer frequent toileting- Follow urinary retention protocol if ordered  Outcome: Progressing  Goal: Absence of urinary retention  Description: INTERVENTIONS:- Assess patient’s ability to void and empty bladder- Monitor I/O- Bladder scan as needed- Discuss with physician/AP medications to alleviate retention as needed- Discuss catheterization for long term situations as appropriate  Outcome: Progressing     Problem: SKIN/TISSUE INTEGRITY - ADULT  Goal: Skin Integrity remains intact(Skin Breakdown Prevention)  Description: Assess:-Perform [place] assessment every shift-Clean and moisturize skin every 2-Inspect skin when repositioning, toileting, and assisting with ADLS-Assess under medical devices such as restraints every 2-Assess extremities for adequate circulation and sensation Bed Management:-Have minimal linens on bed & keep smooth, unwrinkled-Change linens as needed when moist or perspiring-Avoid sitting or lying in one position for more than 2 hours while in bed-Keep HOB at 30degrees Toileting:-Offer bedside commode-Assess for incontinence every 2-Use incontinent care products after each incontinent episode such as proper cleaning equipment Activity:-Mobilize patient 3 times a day-Encourage activity and walks on unit-Encourage or provide ROM exercises -Turn and reposition patient every 2 Hours-Use appropriate  equipment to lift or move patient in bed-Instruct/ Assist with weight shifting every 2 hours  when out of bed in chair-Consider limitation of chair time 2 hour intervalsSkin Care:-Avoid use of baby powder, tape, friction and shearing, hot water or constrictive clothing-Relieve pressure over bony prominences using 2-Do not massage red bony areasNext Steps:-Teach patient strategies to minimize risks such as pressure injuries -  Outcome: Progressing  Goal: Incision(s), wounds(s) or drain site(s) healing without S/S of infection  Description: INTERVENTIONS- Assess and document dressing, incision, wound bed, drain sites and surrounding tissue- Provide patient and family education- Perform skin care/dressing changes as needed  Outcome: Progressing     Problem: HEMATOLOGIC - ADULT  Goal: Maintains hematologic stability  Description: INTERVENTIONS- Assess for signs and symptoms of bleeding or hemorrhage- Monitor labs- Administer supportive blood products/factors as ordered and appropriate  Outcome: Progressing     Problem: MUSCULOSKELETAL - ADULT  Goal: Maintain or return mobility to safest level of function  Description: INTERVENTIONS:- Assess patient's ability to carry out ADLs; assess patient's baseline for ADL function and identify physical deficits which impact ability to perform ADLs (bathing, care of mouth/teeth, toileting, grooming, dressing, etc.)- Assess/evaluate cause of self-care deficits - Assess range of motion- Assess patient's mobility- Assess patient's need for assistive devices and provide as appropriate- Encourage maximum independence but intervene and supervise when necessary- Involve family in performance of ADLs- Assess for home care needs following discharge - Consider OT consult to assist with ADL evaluation and planning for discharge- Provide patient education as appropriate  Outcome: Progressing     Problem: Nutrition/Hydration-ADULT  Goal: Nutrient/Hydration intake appropriate for improving,  restoring or maintaining nutritional needs  Description: Monitor and assess patient's nutrition/hydration status for malnutrition. Collaborate with interdisciplinary team and initiate plan and interventions as ordered.  Monitor patient's weight and dietary intake as ordered or per policy. Utilize nutrition screening tool and intervene as necessary. Determine patient's food preferences and provide high-protein, high-caloric foods as appropriate. INTERVENTIONS:- Monitor oral intake, urinary output, labs, and treatment plans- Assess nutrition and hydration status and recommend course of action- Evaluate amount of meals eaten- Assist patient with eating if necessary - Allow adequate time for meals- Recommend/ encourage appropriate diets, oral nutritional supplements, and vitamin/mineral supplements- Order, calculate, and assess calorie counts as needed- Recommend, monitor, and adjust tube feedings and TPN/PPN based on assessed needs- Assess need for intravenous fluids- Provide specific nutrition/hydration education as appropriate- Include patient/family/caregiver in decisions related to nutrition  Outcome: Progressing     Problem: COPING  Goal: Pt/Family able to verbalize concerns and demonstrate effective coping strategies  Description: INTERVENTIONS:- Assist patient/family to identify coping skills, available support systems and cultural and spiritual values- Provide emotional support, including active listening and acknowledgement of concerns of patient and caregivers- Reduce environmental stimuli, as able- Provide patient education- Assess for spiritual pain/suffering and initiate spiritual care, including notification of Pastoral Care or [facility] based community as needed- Assess effectiveness of coping strategies  Outcome: Progressing  Goal: Will report anxiety at manageable levels  Description: INTERVENTIONS:- Administer medication as ordered- Teach and encourage coping skills- Provide emotional support- Assess  patient/family for anxiety and ability to cope  Outcome: Progressing     Problem: Potential for Falls  Goal: Patient will remain free of falls  Description: INTERVENTIONS:- Educate patient/family on patient safety including physical limitations- Instruct patient to call for assistance with activity - Consult OT/PT to assist with strengthening/mobility - Keep Call bell within reach- Keep bed low and locked with side rails adjusted as appropriate- Keep care items and personal belongings within reach- Initiate and maintain comfort rounds- Make Fall Risk Sign visible to staff- Offer Toileting every 2 Hours, in advance of need- Initiate/Maintain bed/chair alarm- Obtain necessary fall risk management equipment.- Apply yellow socks and bracelet for high fall risk patients- Consider moving patient to room near nurses station  INTERVENTIONS:- Educate patient/family on patient safety including physical limitations- Instruct patient to call for assistance with activity - Consult OT/PT to assist with strengthening/mobility - Keep Call bell within reach- Keep bed low and locked with side rails adjusted as appropriate- Keep care items and personal belongings within reach- Initiate and maintain comfort rounds- Make Fall Risk Sign visible to staff- Offer Toileting every 2 Hours, in advance of need- Initiate/Maintain bed alarm- Obtain necessary fall risk management equipment: bracelet, socks, alarms- Apply yellow socks and bracelet for high fall risk patients- Consider moving patient to room near nurses station  Outcome: Progressing

## 2025-05-03 NOTE — ASSESSMENT & PLAN NOTE
Patient met sepsis criteria on 4/29 with accompanying confusion and agitation.  UA was positive, UCx and BCx showed E. Coli sensitive to cephalosporins.  ID was consulted, is managing, and has approved to restarting chemo 5/2.

## 2025-05-03 NOTE — ASSESSMENT & PLAN NOTE
Fever, HR.  Due to bacteremia, UTI.  No other appreciable source of infection.  ROS and exam otherwise benign..  Hemodynamically stable and nontoxic.  Improved.    Continue antibiotics as below  Follow temperatures closely  Supportive care as per the primary service

## 2025-05-03 NOTE — ASSESSMENT & PLAN NOTE
As evidenced on 4/29 morning with temperature and tachycardia.  Worsening kidney function.  Lactate within normal limits  S/p 1 L bolus, continue IV fluids  Elevated procalcitonin in the setting of recent rituximab  Secondary to UTI, bacteremia  UA with innumerable bacteria and large leukocytes  Urine culture with E. coli  Blood culture with 1 out of 2 with E. coli  On IV antibiotics per ID through 5/5

## 2025-05-03 NOTE — ASSESSMENT & PLAN NOTE
Hemoglobin A1c 6.6% on 2/22/2025  PTA metformin 1000 mg daily, holding  On Lantus 10 units at bedtime   Accu-Cheks reviewed and prandial glucose above goal  Hyperglycemic secondary to steroid use  Start lispro 5 units 3 times daily, insulin sliding scale  Hypoglycemia protocol

## 2025-05-03 NOTE — PLAN OF CARE
Problem: PAIN - ADULT  Goal: Verbalizes/displays adequate comfort level or baseline comfort level  Description: Interventions:- Encourage patient to monitor pain and request assistance- Assess pain using appropriate pain scale- Administer analgesics based on type and severity of pain and evaluate response- Implement non-pharmacological measures as appropriate and evaluate response- Notify physician/advanced practitioner if interventions unsuccessful or patient reports new pain  Outcome: Progressing     Problem: INFECTION - ADULT  Goal: Absence or prevention of progression during hospitalization  Description: INTERVENTIONS:- Assess and monitor for signs and symptoms of infection- Monitor lab/diagnostic results- Monitor all insertion sites, i.e. indwelling lines, tubes, and drains- [place] appropriate cooling/warming therapies per order- Administer medications as ordered- Instruct and encourage patient and family to use good hand hygiene technique- Identify and instruct in appropriate isolation precautions for identified infection/condition  Outcome: Progressing     Problem: SAFETY ADULT  Goal: Patient will remain free of falls  Description: INTERVENTIONS:- Educate patient/family on patient safety including physical limitations- Instruct patient to call for assistance with activity - Consult OT/PT to assist with strengthening/mobility - Keep Call bell within reach- Keep bed low and locked with side rails adjusted as appropriate- Keep care items and personal belongings within reach- Initiate and maintain comfort rounds- Make Fall Risk Sign visible to staff- Offer Toileting every 2 Hours, in advance of need- Initiate/Maintain bed/chair alarm- Obtain necessary fall risk management equipment.- Apply yellow socks and bracelet for high fall risk patients- Consider moving patient to room near nurses station  INTERVENTIONS:- Educate patient/family on patient safety including physical limitations- Instruct patient to call  for assistance with activity - Consult OT/PT to assist with strengthening/mobility - Keep Call bell within reach- Keep bed low and locked with side rails adjusted as appropriate- Keep care items and personal belongings within reach- Initiate and maintain comfort rounds- Make Fall Risk Sign visible to staff- Offer Toileting every 2 Hours, in advance of need- Initiate/Maintain bed alarm- Obtain necessary fall risk management equipment: bracelet, socks, alarms- Apply yellow socks and bracelet for high fall risk patients- Consider moving patient to room near nurses station  Outcome: Progressing  Goal: Maintain or return to baseline ADL function  Description: INTERVENTIONS:-  Assess patient's ability to carry out ADLs; assess patient's baseline for ADL function and identify physical deficits which impact ability to perform ADLs (bathing, care of mouth/teeth, toileting, grooming, dressing, etc.)- Assess/evaluate cause of self-care deficits - Assess range of motion- Assess patient's mobility; develop plan if impaired- Assess patient's need for assistive devices and provide as appropriate- Encourage maximum independence but intervene and supervise when necessary- Involve family in performance of ADLs- Assess for home care needs following discharge - Consider OT consult to assist with ADL evaluation and planning for discharge- Provide patient education as appropriate  Outcome: Progressing     Problem: DISCHARGE PLANNING  Goal: Discharge to home or other facility with appropriate resources  Description: INTERVENTIONS:- Identify barriers to discharge w/patient and caregiver- Arrange for needed discharge resources and transportation as appropriate- Identify discharge learning needs (meds, wound care, etc.)- Refer to Case Management Department for coordinating discharge planning if the patient needs post-hospital services based on physician/advanced practitioner order or complex needs related to functional status, cognitive  ability, or social support system  Outcome: Progressing     Problem: Knowledge Deficit  Goal: Patient/family/caregiver demonstrates understanding of disease process, treatment plan, medications, and discharge instructions  Description: Complete learning assessment and assess knowledge base.Interventions:- Provide teaching at level of understanding- Provide teaching via preferred learning methods  Outcome: Progressing     Problem: NEUROSENSORY - ADULT  Goal: Achieves stable or improved neurological status  Description: INTERVENTIONS- Monitor and report changes in neurological status- Monitor vital signs such as temperature, blood pressure, glucose, and any other labs ordered - Initiate measures to prevent increased intracranial pressure- Monitor for seizure activity and implement precautions if appropriate    INTERVENTIONS- Monitor and report changes in neurological status- Monitor vital signs such as temperature, blood pressure, glucose, and any other labs ordered - Initiate measures to prevent increased intracranial pressure- Monitor for seizure activity and implement precautions if appropriate    Outcome: Progressing  Goal: Remains free of injury related to seizures activity  Description: INTERVENTIONS- Maintain airway, patient safety  and administer oxygen as ordered- Monitor patient for seizure activity, document and report duration and description of seizure to physician/advanced practitioner- If seizure occurs,  ensure patient safety during seizure- Reorient patient post seizure- Seizure pads on all 4 side rails- Instruct patient/family to notify RN of any seizure activity including if an aura is experienced- Instruct patient/family to call for assistance with activity based on nursing assessment- Administer anti-seizure medications if ordered  INTERVENTIONS- Maintain airway, patient safety  and administer oxygen as ordered- Monitor patient for seizure activity, document and report duration and description of  seizure to physician/advanced practitioner- If seizure occurs,  ensure patient safety during seizure- Reorient patient post seizure- Seizure pads on all 4 side rails- Instruct patient/family to notify RN of any seizure activity including if an aura is experienced- Instruct patient/family to call for assistance with activity based on nursing assessment- Administer anti-seizure medications if ordered  Outcome: Progressing  Goal: Achieves maximal functionality and self care  Description: INTERVENTIONS- Monitor swallowing and airway patency with patient fatigue and changes in neurological status- Encourage and assist patient to increase activity and self care. - Encourage visually impaired, hearing impaired and aphasic patients to use assistive/communication devices  INTERVENTIONS- Monitor swallowing and airway patency with patient fatigue and changes in neurological status- Encourage and assist patient to increase activity and self care. - Encourage visually impaired, hearing impaired and aphasic patients to use assistive/communication devices  Outcome: Progressing     Problem: METABOLIC, FLUID AND ELECTROLYTES - ADULT  Goal: Glucose maintained within target range  Description: INTERVENTIONS:- Monitor Blood Glucose as ordered- Assess for signs and symptoms of hyperglycemia and hypoglycemia- Administer ordered medications to maintain glucose within target range- Assess nutritional intake and initiate nutrition service referral as needed  INTERVENTIONS:- Monitor Blood Glucose as ordered- Assess for signs and symptoms of hyperglycemia and hypoglycemia- Administer ordered medications to maintain glucose within target range- Assess nutritional intake and initiate nutrition service referral as needed  Outcome: Progressing  Goal: Electrolytes maintained within normal limits  Description: INTERVENTIONS:- Monitor labs and assess patient for signs and symptoms of electrolyte imbalances- Administer electrolyte replacement as  ordered- Monitor response to electrolyte replacements, including repeat lab results as appropriate- Instruct patient on fluid and nutrition as appropriate  Outcome: Progressing  Goal: Fluid balance maintained  Description: INTERVENTIONS:- Monitor labs - Monitor I/O and WT- Instruct patient on fluid and nutrition as appropriate- Assess for signs & symptoms of volume excess or deficit  Outcome: Progressing     Problem: CARDIOVASCULAR - ADULT  Goal: Maintains optimal cardiac output and hemodynamic stability  Description: INTERVENTIONS:- Monitor I/O, vital signs and rhythm- Monitor for S/S and trends of decreased cardiac output- Administer and titrate ordered vasoactive medications to optimize hemodynamic stability- Assess quality of pulses, skin color and temperature- Assess for signs of decreased coronary artery perfusion- Instruct patient to report change in severity of symptoms  Outcome: Progressing  Goal: Absence of cardiac dysrhythmias or at baseline rhythm  Description: INTERVENTIONS:- Continuous cardiac monitoring, vital signs, obtain 12 lead EKG if ordered- Administer antiarrhythmic and heart rate control medications as ordered- Monitor electrolytes and administer replacement therapy as ordered  Outcome: Progressing     Problem: GASTROINTESTINAL - ADULT  Goal: Maintains or returns to baseline bowel function  Description: INTERVENTIONS:- Assess bowel function- Encourage oral fluids to ensure adequate hydration- Administer IV fluids if ordered to ensure adequate hydration- Administer ordered medications as needed- Encourage mobilization and activity- Consider nutritional services referral to assist patient with adequate nutrition and appropriate food choices  Outcome: Progressing  Goal: Maintains adequate nutritional intake  Description: INTERVENTIONS:- Monitor percentage of each meal consumed- Identify factors contributing to decreased intake, treat as appropriate- Assist with meals as needed- Monitor I&O,  weight, and lab values if indicated- Obtain nutrition services referral as needed  Outcome: Progressing     Problem: GENITOURINARY - ADULT  Goal: Maintains or returns to baseline urinary function  Description: INTERVENTIONS:- Assess urinary function- Encourage oral fluids to ensure adequate hydration if ordered- Administer IV fluids as ordered to ensure adequate hydration- Administer ordered medications as needed- Offer frequent toileting- Follow urinary retention protocol if ordered  Outcome: Progressing  Goal: Absence of urinary retention  Description: INTERVENTIONS:- Assess patient’s ability to void and empty bladder- Monitor I/O- Bladder scan as needed- Discuss with physician/AP medications to alleviate retention as needed- Discuss catheterization for long term situations as appropriate  Outcome: Progressing     Problem: SKIN/TISSUE INTEGRITY - ADULT  Goal: Skin Integrity remains intact(Skin Breakdown Prevention)  Description: Assess:-Perform [place] assessment every shift-Clean and moisturize skin every 2-Inspect skin when repositioning, toileting, and assisting with ADLS-Assess under medical devices such as restraints every 2-Assess extremities for adequate circulation and sensation Bed Management:-Have minimal linens on bed & keep smooth, unwrinkled-Change linens as needed when moist or perspiring-Avoid sitting or lying in one position for more than 2 hours while in bed-Keep HOB at 30degrees Toileting:-Offer bedside commode-Assess for incontinence every 2-Use incontinent care products after each incontinent episode such as proper cleaning equipment Activity:-Mobilize patient 3 times a day-Encourage activity and walks on unit-Encourage or provide ROM exercises -Turn and reposition patient every 2 Hours-Use appropriate equipment to lift or move patient in bed-Instruct/ Assist with weight shifting every 2 hours  when out of bed in chair-Consider limitation of chair time 2 hour intervalsSkin Care:-Avoid use of  baby powder, tape, friction and shearing, hot water or constrictive clothing-Relieve pressure over bony prominences using 2-Do not massage red bony areasNext Steps:-Teach patient strategies to minimize risks such as pressure injuries -  Outcome: Progressing  Goal: Incision(s), wounds(s) or drain site(s) healing without S/S of infection  Description: INTERVENTIONS- Assess and document dressing, incision, wound bed, drain sites and surrounding tissue- Provide patient and family education- Perform skin care/dressing changes as needed  Outcome: Progressing     Problem: HEMATOLOGIC - ADULT  Goal: Maintains hematologic stability  Description: INTERVENTIONS- Assess for signs and symptoms of bleeding or hemorrhage- Monitor labs- Administer supportive blood products/factors as ordered and appropriate  Outcome: Progressing     Problem: MUSCULOSKELETAL - ADULT  Goal: Maintain or return mobility to safest level of function  Description: INTERVENTIONS:- Assess patient's ability to carry out ADLs; assess patient's baseline for ADL function and identify physical deficits which impact ability to perform ADLs (bathing, care of mouth/teeth, toileting, grooming, dressing, etc.)- Assess/evaluate cause of self-care deficits - Assess range of motion- Assess patient's mobility- Assess patient's need for assistive devices and provide as appropriate- Encourage maximum independence but intervene and supervise when necessary- Involve family in performance of ADLs- Assess for home care needs following discharge - Consider OT consult to assist with ADL evaluation and planning for discharge- Provide patient education as appropriate  Outcome: Progressing     Problem: Prexisting or High Potential for Compromised Skin Integrity  Goal: Skin integrity is maintained or improved  Description: INTERVENTIONS:- Identify patients at risk for skin breakdown- Assess and monitor skin integrity- Assess and monitor nutrition and hydration status- Monitor labs -  Assess for incontinence - Turn and reposition patient- Assist with mobility/ambulation- Relieve pressure over bony prominences- Avoid friction and shearing- Provide appropriate hygiene as needed including keeping skin clean and dry- Evaluate need for skin moisturizer/barrier cream- Collaborate with interdisciplinary team - Patient/family teaching- Consider wound care consult   Outcome: Progressing     Problem: SAFETY,RESTRAINT: NV/NON-SELF DESTRUCTIVE BEHAVIOR  Goal: Remains free of harm/injury (restraint for non violent/non self-detsructive behavior)  Description: INTERVENTIONS:- Instruct patient/family regarding restraint use - Assess and monitor physiologic and psychological status - Provide interventions and comfort measures to meet assessed patient needs - Identify and implement measures to help patient regain control- Assess readiness for release of restraint   Outcome: Progressing  Goal: Returns to optimal restraint-free functioning  Description: INTERVENTIONS:- Assess the patient's behavior and symptoms that indicate continued need for restraint- Identify and implement measures to help patient regain control- Assess readiness for release of restraint   Outcome: Progressing     Problem: Nutrition/Hydration-ADULT  Goal: Nutrient/Hydration intake appropriate for improving, restoring or maintaining nutritional needs  Description: Monitor and assess patient's nutrition/hydration status for malnutrition. Collaborate with interdisciplinary team and initiate plan and interventions as ordered.  Monitor patient's weight and dietary intake as ordered or per policy. Utilize nutrition screening tool and intervene as necessary. Determine patient's food preferences and provide high-protein, high-caloric foods as appropriate. INTERVENTIONS:- Monitor oral intake, urinary output, labs, and treatment plans- Assess nutrition and hydration status and recommend course of action- Evaluate amount of meals eaten- Assist patient with  eating if necessary - Allow adequate time for meals- Recommend/ encourage appropriate diets, oral nutritional supplements, and vitamin/mineral supplements- Order, calculate, and assess calorie counts as needed- Recommend, monitor, and adjust tube feedings and TPN/PPN based on assessed needs- Assess need for intravenous fluids- Provide specific nutrition/hydration education as appropriate- Include patient/family/caregiver in decisions related to nutrition  Outcome: Progressing     Problem: COPING  Goal: Pt/Family able to verbalize concerns and demonstrate effective coping strategies  Description: INTERVENTIONS:- Assist patient/family to identify coping skills, available support systems and cultural and spiritual values- Provide emotional support, including active listening and acknowledgement of concerns of patient and caregivers- Reduce environmental stimuli, as able- Provide patient education- Assess for spiritual pain/suffering and initiate spiritual care, including notification of Pastoral Care or luc[facility] based community as needed- Assess effectiveness of coping strategies  Outcome: Progressing  Goal: Will report anxiety at manageable levels  Description: INTERVENTIONS:- Administer medication as ordered- Teach and encourage coping skills- Provide emotional support- Assess patient/family for anxiety and ability to cope  Outcome: Progressing     Problem: Potential for Falls  Goal: Patient will remain free of falls  Description: INTERVENTIONS:- Educate patient/family on patient safety including physical limitations- Instruct patient to call for assistance with activity - Consult OT/PT to assist with strengthening/mobility - Keep Call bell within reach- Keep bed low and locked with side rails adjusted as appropriate- Keep care items and personal belongings within reach- Initiate and maintain comfort rounds- Make Fall Risk Sign visible to staff- Offer Toileting every 2 Hours, in advance of need- Initiate/Maintain  bed/chair alarm- Obtain necessary fall risk management equipment.- Apply yellow socks and bracelet for high fall risk patients- Consider moving patient to room near nurses station  INTERVENTIONS:- Educate patient/family on patient safety including physical limitations- Instruct patient to call for assistance with activity - Consult OT/PT to assist with strengthening/mobility - Keep Call bell within reach- Keep bed low and locked with side rails adjusted as appropriate- Keep care items and personal belongings within reach- Initiate and maintain comfort rounds- Make Fall Risk Sign visible to staff- Offer Toileting every 2 Hours, in advance of need- Initiate/Maintain bed alarm- Obtain necessary fall risk management equipment: bracelet, socks, alarms- Apply yellow socks and bracelet for high fall risk patients- Consider moving patient to room near nurses station  Outcome: Progressing

## 2025-05-03 NOTE — PROGRESS NOTES
Progress Note - Oncology-Medical   Name: Alexis Dennison 64 y.o. male I MRN: [id number]  [facility] Date of Admission: 3/29/2025   Date of Service: 5/3/2025 I Hospital Day: 35    Assessment & Plan  Primary CNS lymphoma  64 yoM with PMHx of DM2, NAFLD, and HT who presented with progressive encephalopathy found to have multiple scattered nodular cerebral and cerebellar enhancement who underwent two non-diagnostic LPs prompting stereotactic brain biopsy (4/14) which showed large B-cell lymphoma.  See oncology progress note on 4/21 for full diagnostic work up.  On 4/29, agitation led to infectious work up with showed UA+ and BCx with 2/2 E. Coli sensitive to ceftriaxone.  ID was consulted is managing and has approved to restart chemo 5/2.    Treatment Plan: regimen modified from (https://pubmed.ncbi.nlm.nih.gov/59672156/)  High-dose methotrexate every 2 weeks x4 cycles followed by every 4 weeks maintenance (could consider dosing every 4 weeks if he discharges to outside rehab)  C1 (4/17) 8 g/m²  C2 (5/2) 3 g/m², dose-reduced due to LETY, delayed x1 day due to E. coli bacteremia   UA Q6H to assess alkalinization of urine (goal urine pH>7.0)  HCO3 100 mEq at 150 mL/hr  Methotrexate levels every 24 hours beginning 24 hours from the start of his infusion  Rituximab (375 mg/m²) weekly x8 weeks  C1D1 (4/18)  C1D8 (4/24)  C2D1 (5/3), ok to treat  C2D8 (5/10), planned  Re-consulted Nephrology given LETY with first cycle, appreciate recs  Final path is pending from biopsy (4/14)  E coli bacteremia  Patient met sepsis criteria on 4/29 with accompanying confusion and agitation.  UA was positive, UCx and BCx showed E. Coli sensitive to cephalosporins.  ID was consulted, is managing, and has approved to restarting chemo 5/2.  Pulmonary nodule  CTA 3/29 with nonspecific 4 mm RLL pulm nodule, recommendation for 3 month follow up  Liver lesion  CTA 3/29 with nonspecific 12mm hypodense right hepatic lesion, recommended MRI  "abdomen  MRI 3/30 with no suspicious hepatic lesions, demonstrated simple right hepatic lobe cyst    Subjective   NAEON, now new complaints, continues on 1:1, AAOx1-2, increasingly clear speech though thought process and expression not always clear, UpH is therapeutic, tolerated MTX well, ok to treat with rituximab today    Objective :  Temp:  [97.9 °F (36.6 °C)-98.4 °F (36.9 °C)] 98.4 °F (36.9 °C)  HR:  [55-72] 55  BP: (122-149)/(53-82) 149/82  Resp:  [17-18] 17  SpO2:  [94 %-97 %] 96 %  O2 Device: None (Room air)    Physical Exam  General: AAOx1-2, well-appearing, confused, no acute distress, 1:1  HEENT: well-healing surgical scars, no ventricular drain, PERRLA, moist mucosa  Respiratory: CTAB w/o wheezes, rales, or rhonchi, no increased work of breathing  Cardiovascular: regular rhythm, bradycardia, w/o murmurs, 2+ radial and pedal pulses, no b/l LE edema  Abdomen: soft, non-tender, non-distended, no hepatomegaly or splenomegaly  /Rectal: deferred  Musculoskeletal: moves all four extremities with normal strength and ROM  Integumentary: warm, dry, no rash or bruising  Neurological: confused, reduced coordination  Psychiatric: pleasant and cooperative with normal mood, affect, and cognition      Lab Results: I have reviewed the following results:CBC/BMP:   .     05/03/25  0521   SODIUM 140   K 3.7      CO2 30   BUN 30*   CREATININE 1.52*   GLUC 183*   CAIONIZED 1.09*   PHOS 2.7    , PTT/INR:No new results in last 24 hours. , Blood Culture: No results found for: \"BLOODCX\", Urinalysis:   Lab Results   Component Value Date    COLORU Light Yellow 05/03/2025    CLARITYU Clear 05/03/2025    SPECGRAV 1.009 05/03/2025    PHUR 8.0 05/03/2025    LEUKOCYTESUR Negative 05/03/2025    NITRITE Negative 05/03/2025    GLUCOSEU 70 (7/100%) (A) 05/03/2025    KETONESU Negative 05/03/2025    BILIRUBINUR Negative 05/03/2025    BLOODU Trace (A) 05/03/2025   , Urine Culture: No results found for: \"URINECX\"  Lab Results   Component " Value Date    K 3.7 05/03/2025     05/03/2025    CO2 30 05/03/2025    BUN 30 (H) 05/03/2025    CREATININE 1.52 (H) 05/03/2025    GLUF 131 (H) 02/22/2025    CALCIUM 7.8 (L) 05/03/2025    CORRECTEDCA 10.2 (H) 04/27/2025    AST 15 04/29/2025    ALT 62 (H) 04/29/2025    ALKPHOS 64 04/29/2025    EGFR 47 05/03/2025     Lab Results   Component Value Date    WBC 3.51 (L) 05/02/2025    WBC 3.51 (L) 05/02/2025    HGB 10.1 (L) 05/02/2025    HGB 10.1 (L) 05/02/2025    HCT 28.2 (L) 05/02/2025    HCT 28.2 (L) 05/02/2025    MCV 87 05/02/2025    MCV 87 05/02/2025     (L) 05/02/2025     (L) 05/02/2025     Lab Results   Component Value Date    NEUTROABS 2.51 05/02/2025     Imaging Results Review: No pertinent imaging studies reviewed.  Other Study Results Review: No additional pertinent studies reviewed.    Administrative Statements   I have spent a total time of 45 minutes in caring for this patient on the day of the visit/encounter including Diagnostic results, Prognosis, Risks and benefits of tx options, Instructions for management, Patient and family education, Importance of tx compliance, Risk factor reductions, Impressions, Counseling / Coordination of care, Documenting in the medical record, Reviewing/placing orders in the medical record (including tests, medications, and/or procedures), Obtaining or reviewing history  , and Communicating with other healthcare professionals .    Additional recommendations to follow per attending, Dr. Dumont.    Bryson Garcia DO, PGY4  Hematology/Oncology Fellow

## 2025-05-03 NOTE — ASSESSMENT & PLAN NOTE
Diagnosed by LP, brain biopsy.  Complicated by obstructive hydrocephalus, status post EVD 4/13 through 4/19.  Status post Rituxan, high-dose methotrexate, and intrathecal cytarabine on 4/17.    Given clinical improvement, low risk from ID perspective for ongoing chemo

## 2025-05-03 NOTE — ASSESSMENT & PLAN NOTE
Multifactorial in the setting of brain mass, vasogenic edema, medication induced and hospital-acquired delirium  Continue Seroquel   On steroid taper as above  Continue delirium precautions  Patient on 1:1 for safety

## 2025-05-03 NOTE — ASSESSMENT & PLAN NOTE
"Patient with development of worsening confusion, recently seen at the 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  Lymphoma/leukemia panel nondiagnostic  CT head on 4/3 with interval increase in ventricular caliber concerning for worsening hydrocephalus. Repeat LP on 4/7 was again undiagnostic.   MRI of the brain from 4/11-\"Interval enlargement of the dominant left anterior basal ganglionic mass lesion with greater surrounding vasogenic edema and mass effect. 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  S/p EVD, self removed over 4/26 weekend  Completed keppra 500mg BID x 7 days for postoperative seizure ppx per neurosurgery  Patient started on chemotherapy while inpatient.  Discussed with oncology and plan for rituximab infusion weekly and methotrexate every 2 weeks.  Patient will require daily serum methotrexate levels until less than 0.1.  Also recommend dexamethasone taper as follows:4 mg Q8H through 5/5, then 4 mg BID x4 days, then 2 mg BID x4 days, then 1 mg BID x4 days, then 1 mg daily x4 days, then discontinue.  Maintain PICC line  Patient medically stable for rehab.  Insurance Auth obtained, awaiting bed  "

## 2025-05-03 NOTE — ASSESSMENT & PLAN NOTE
Iatrogenic in the setting of a bicarbonate drip  Bicarbonate level 30  Check urine analysis every 6 hours  Check daily VBG

## 2025-05-03 NOTE — ASSESSMENT & PLAN NOTE
Due to UTI as below.  No other appreciable source.  LFTs stable.  CT A/P unremarkable.  Consider PICC infection, patinet self-removed.  Low suspicion for CNS infection given stable albeit fluctuating mental status.    Continue cefazolin for 7 days total antibiotics through 5/5

## 2025-05-03 NOTE — ASSESSMENT & PLAN NOTE
Status post methotrexate  Alkalinization of the urine with a bicarbonate drip for methotrexate  Continue to be confused, 1:1\

## 2025-05-03 NOTE — ASSESSMENT & PLAN NOTE
Blood cultures with 1 out of 2 with E. Coli  CT abdomen pelvis on 4/30 with no acute pathology  On IV antibiotics per ID through 5/5

## 2025-05-03 NOTE — QUICK NOTE
In consideration for discharge to [facility], the patient currently has the following requirements from an oncology perspective.  The patient is otherwise stable for discharge to [facility] from our perspective.    The following is required until daily serum methotrexate levels are <0.1:  Daily serum methotrexate levels  Leucovorin Q6H  UA Q6H to monitor urine pH with goal >7.0  HCO3 100 mEq at 150 mL/hr  Rituximab infusion weekly  High-dose methotrexate every two weeks  Dexamethasone taper: 4 mg Q8H through 5/5, then 4 mg BID x4 days, then 2 mg BID x4 days, then 1 mg BID x4 days, then 1 mg daily x4 days, then discontinue  PICC line  1:1 monitoring until determined to be clinically unnecessary  Antibiotics per ID and primary  Daily CBC and CMP    Bryson Garcia DO, PGY4  Hematology/Oncology Fellow

## 2025-05-03 NOTE — ASSESSMENT & PLAN NOTE
64 yoM with PMHx of DM2, NAFLD, and HT who presented with progressive encephalopathy found to have multiple scattered nodular cerebral and cerebellar enhancement who underwent two non-diagnostic LPs prompting stereotactic brain biopsy (4/14) which showed large B-cell lymphoma.  See oncology progress note on 4/21 for full diagnostic work up.  On 4/29, agitation led to infectious work up with showed UA+ and BCx with 2/2 E. Coli sensitive to ceftriaxone.  ID was consulted is managing and has approved to restart chemo 5/2.    Treatment Plan: regimen modified from (https://pubmed.ncbi.nlm.nih.gov/21993001/)  High-dose methotrexate every 2 weeks x4 cycles followed by every 4 weeks maintenance (could consider dosing every 4 weeks if he discharges to outside rehab)  C1 (4/17) 8 g/m²  C2 (5/2) 3 g/m², dose-reduced due to LETY, delayed x1 day due to E. coli bacteremia   UA Q6H to assess alkalinization of urine (goal urine pH>7.0)  HCO3 100 mEq at 150 mL/hr  Methotrexate levels every 24 hours beginning 24 hours from the start of his infusion  Rituximab (375 mg/m²) weekly x8 weeks  C1D1 (4/18)  C1D8 (4/24)  C2D1 (5/3), ok to treat  C2D8 (5/10), planned  Re-consulted Nephrology given LETY with first cycle, denisa holguin  Final path is pending from biopsy (4/14)

## 2025-05-03 NOTE — PROGRESS NOTES
Progress Note - Infectious Disease   Name: Alexis Dennison 64 y.o. male I MRN: [id number]  [facility] Date of Admission: 3/29/2025   Date of Service: 5/3/2025 I Hospital Day: 35    Assessment & Plan  Sepsis (HCC)  Fever, HR.  Due to bacteremia, UTI.  No other appreciable source of infection.  ROS and exam otherwise benign..  Hemodynamically stable and nontoxic.  Improved.    Continue antibiotics as below  Follow temperatures closely  Supportive care as per the primary service  E coli bacteremia  Due to UTI as below.  No other appreciable source.  LFTs stable.  CT A/P unremarkable.  Consider PICC infection, patinet self-removed.  Low suspicion for CNS infection given stable albeit fluctuating mental status.    Continue cefazolin for 7 days total antibiotics through 5/5  E. coli UTI  Pyuria, recent external catheter.  No evidence for retention.  Unreliable historian in terms of symptoms.    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  Complicated by obstructive hydrocephalus, status post EVD 4/13 through 4/19.  Status post Rituxan, high-dose methotrexate, and intrathecal cytarabine on 4/17.    Given clinical improvement, low risk from ID perspective for ongoing chemo  LETY (acute kidney injury) (HCC)  Likely multifactorial.  Fluctuating.    Follow creatinine closely and dose-adjust antibiotics as indicated  Type 2 diabetes mellitus with hyperglycemia, without long-term current use of insulin (HCC)  Lab Results   Component Value Date    HGBA1C 6.6 (H) 02/22/2025   Risk factor for infection  Constipation  Risk factor for bacteremia.  Encephalopathy  Multifactorial due to underlying CNS lymphoma, medications, sepsis and infection as above.  Stable.        Antibiotics:  Cefazolin #2  Antibiotics #5    Subjective   Patient limited historian but offers no complaints.    Objective :  Temp:  [97.9 °F (36.6 °C)-98.4 °F (36.9 °C)] 98.4 °F (36.9 °C)  HR:   [55-72] 59  BP: (122-149)/(53-82) 149/82  Resp:  [17-18] 17  SpO2:  [94 %-97 %] 97 %  O2 Device: None (Room air)    General:  No acute distress  Psychiatric:  Awake and alert  Pulmonary:  Normal respiratory excursion without accessory muscle use  Abdomen:  Soft, nontender  Extremities:  No edema  Skin:  No rashes      Lab Results: I have reviewed the following results:  Results from last 7 days   Lab Units 05/02/25  0544 05/01/25  0612 04/30/25  1144   WBC Thousand/uL 3.51*  3.51* 4.62 5.69   HEMOGLOBIN g/dL 10.1*  10.1* 10.2* 11.3*   PLATELETS Thousands/uL 110*  110* 77* 67*     Results from last 7 days   Lab Units 05/03/25  0521 05/02/25  0544 05/01/25  0612 04/30/25  1304 04/29/25  0400 04/28/25  0721 04/27/25  0524   SODIUM mmol/L 140 137 134*   < > 143   < > 142   POTASSIUM mmol/L 3.7 4.0 5.3   < > 4.1   < > 3.8   CHLORIDE mmol/L 103 102 100   < > 103   < > 106   CO2 mmol/L 30 31 28   < > 28   < > 30   BUN mg/dL 30* 32* 42*   < > 46*   < > 42*   CREATININE mg/dL 1.52* 1.37* 1.47*   < > 2.04*   < > 1.60*   EGFR ml/min/1.73sq m 47 53 49   < > 33   < > 44   CALCIUM mg/dL 7.8* 8.4 8.2*   < > 9.6   < > 9.5   AST U/L  --   --   --   --  15  --  23   ALT U/L  --   --   --   --  62*  --  83*   ALK PHOS U/L  --   --   --   --  64  --  46   ALBUMIN g/dL  --   --   --   --  3.5  --  3.1*    < > = values in this interval not displayed.     Results from last 7 days   Lab Units 04/29/25  1448 04/29/25  0341   BLOOD CULTURE   --  No Growth After 4 Days.  Escherichia coli*   GRAM STAIN RESULT   --  Gram negative rods*   URINE CULTURE  >100,000 cfu/ml Escherichia coli*  --      Results from last 7 days   Lab Units 04/29/25  0406   PROCALCITONIN ng/ml 3.93*

## 2025-05-03 NOTE — PROGRESS NOTES
NEPHROLOGY PROGRESS NOTE   Alexis Dennison 64 y.o. male MRN: [id number]  Unit/Bed#: [facility] 904-01 Encounter: [id number]  Reason for Consult: LETY    ASSESSMENT AND PLAN:  Assessment & Plan  LETY (acute kidney injury) (HCC)  #Non-Oliguric severe KDIGO LETY stage 3 with evidence of kidney recovery  Etiology: Multifactorial likely secondary to hemodynamic changes  Baseline creatinine 0.93 mg/dL  Peak creatinine: 2.7 mg/dL  Renal function improved to 1.3 yesterday, slightly increased 1.5 today.    UA: Microhematuria, leukocyturia  Renal imaging : No hydronephrosis  Treatment:  No indication of dialysis, continue to closely monitor BMP.  Avoid hypotension, no NSAIDs, no IV contrast   Status post first dose of methotrexate on 4/17/2025 and second dose 5/2/2025.  Continue alkalinization of the urine with bicarbonate drip check urine analysis every 6 hours.  Noted venous pH 7.48, urine pH 8.0  Monitor methotrexate level as per oncology  Monitor renal function closely while on methotrexate.  Serum uric acid 2.1, ionized calcium 1.0.  Phosphorus 2.7.  HTN, goal below 130/80  Blood pressure acceptable  Primary CNS lymphoma  Status post methotrexate  Alkalinization of the urine with a bicarbonate drip for methotrexate  Continue to be confused, 1:1\  Metabolic alkalosis  Iatrogenic in the setting of a bicarbonate drip  Bicarbonate level 30  Check urine analysis every 6 hours  Check daily VBG       Discussed above plan in detail with primary team regarding continue current bicarb drip, monitoring BMP, UA, VBG closely and they agree with above recommendations.      SUBJECTIVE:  Patient seen and examined at bedside.  Denies any nausea or vomiting.  Somewhat confused.    OBJECTIVE:  Current Weight: Weight - Scale: 75.5 kg (166 lb 7.2 oz)  Vitals:    05/03/25 1108   BP: 149/80   Pulse: 59   Resp: 18   Temp: 98.2 °F (36.8 °C)   SpO2: 93%       Intake/Output Summary (Last 24 hours) at 5/3/2025 1329  Last data filed at 5/3/2025 1246  Gross per  24 hour   Intake 5296.67 ml   Output 2150 ml   Net 3146.67 ml     Wt Readings from Last 3 Encounters:   05/02/25 75.5 kg (166 lb 7.2 oz)   04/16/25 86.2 kg (190 lb 0.6 oz)   03/27/25 86 kg (189 lb 9.6 oz)     Temp Readings from Last 3 Encounters:   05/03/25 98.2 °F (36.8 °C)   03/24/25 98.3 °F (36.8 °C) (Oral)   06/07/23 98.3 °F (36.8 °C)     BP Readings from Last 3 Encounters:   05/03/25 149/80   03/27/25 108/70   03/24/25 126/66     Pulse Readings from Last 3 Encounters:   05/03/25 59   03/27/25 76   03/24/25 67        Physical Examination:  Eyes: Mild conjunctival pallor present  Respiratory: Bilateral air entry present  CVS: No significant edema in legs  GI: Soft, nondistended  CNS: Active, alert, confused  Skin: No new rash  Musculoskeletal: No obvious new gross deformity noted    Medications:    Current Facility-Administered Medications:     acetaminophen (TYLENOL) tablet 650 mg, 650 mg, Oral, Q6H PRN, Rustam Drummond MD    allopurinol (ZYLOPRIM) tablet 300 mg, 300 mg, Oral, Daily, Rustam Drummond MD, 300 mg at 05/03/25 0832    alteplase (CATHFLO) injection 2 mg, 2 mg, Intracatheter, Q1MIN PRN, Eva Ortiz MD    aluminum-magnesium hydroxide-simethicone (MAALOX) oral suspension 30 mL, 30 mL, Oral, Q4H PRN, Rustam Drummond MD    atorvastatin (LIPITOR) tablet 20 mg, 20 mg, Oral, Daily, Rustam Drummond MD, 20 mg at 05/03/25 0832    bisacodyl (DULCOLAX) rectal suppository 10 mg, 10 mg, Rectal, Daily, Rustam Drummond MD, 10 mg at 05/01/25 1259    calcium carbonate (TUMS) chewable tablet 1,000 mg, 1,000 mg, Oral, Daily PRN, Rustam Drummond MD    ceFAZolin (ANCEF) IVPB (premix in dextrose) 2,000 mg 50 mL, 2,000 mg, Intravenous, Q8H, Lisa Singh MD, Last Rate: 100 mL/hr at 05/03/25 1138, 2,000 mg at 05/03/25 1138    chlorhexidine (PERIDEX) 0.12 % oral rinse 15 mL, 15 mL, Mouth/Throat, Q12H [facility], Rustam Drummond MD, 15 mL at 05/03/25 0832    dexamethasone  (DECADRON) tablet 4 mg, 4 mg, Oral, Q8H [facility], Dionne Huang MD, 4 mg at 05/03/25 1302    heparin (porcine) subcutaneous injection 5,000 Units, 5,000 Units, Subcutaneous, Q8H CAIT, Rustam Drummond MD, 5,000 Units at 05/03/25 1302    insulin glargine (LANTUS) subcutaneous injection 10 Units 0.1 mL, 10 Units, Subcutaneous, HS, Rustam Drummond MD, 10 Units at 05/02/25 2135    insulin lispro (HumALOG/ADMELOG) 100 units/mL subcutaneous injection 5-25 Units, 5-25 Units, Subcutaneous, TID AC, 10 Units at 05/03/25 1300 **AND** Fingerstick Glucose (POCT), , , TID AC, Rustam Drummond MD    insulin lispro (HumALOG/ADMELOG) 100 units/mL subcutaneous injection 5-25 Units, 5-25 Units, Subcutaneous, HS, Rustam Drummond MD, 20 Units at 05/02/25 2136    leucovorin 25 mg in dextrose 5 % 50 mL IVPB, 25 mg, Intravenous, Q6H, Dionne Huang MD    melatonin tablet 6 mg, 6 mg, Oral, HS, Rustam Drummond MD, 6 mg at 05/02/25 2031    OLANZapine (ZyPREXA) IM injection 5 mg, 5 mg, Intramuscular, BID PRN, Dionne Huang MD, 5 mg at 05/01/25 1122    ondansetron (ZOFRAN) injection 4 mg, 4 mg, Intravenous, Q6H PRN, Rustam Drummond MD, 4 mg at 04/14/25 0930    oxyCODONE (ROXICODONE) split tablet 2.5 mg, 2.5 mg, Oral, Q4H PRN **OR** oxyCODONE (ROXICODONE) IR tablet 5 mg, 5 mg, Oral, Q4H PRN, Rustam Drummond MD, 5 mg at 04/29/25 1351    pantoprazole (PROTONIX) EC tablet 40 mg, 40 mg, Oral, Early Morning, Rustam Drummond MD, 40 mg at 05/03/25 0535    QUEtiapine (SEROquel) tablet 12.5 mg, 12.5 mg, Oral, Daily, Reji Kay MD, 12.5 mg at 05/03/25 1139    QUEtiapine (SEROquel) tablet 50 mg, 50 mg, Oral, HS, Rylie Stapleton PA-C, 50 mg at 05/02/25 2135    senna (SENOKOT) tablet 17.2 mg, 2 tablet, Oral, HS, Rustam Drummond MD, 17.2 mg at 05/01/25 2325    sodium bicarbonate 100 mEq in dextrose 5 % 1,000 mL infusion, 100 mL/hr, Intravenous, Continuous, Bryson Garcia DO, Last Rate:  100 mL/hr at 05/03/25 0039, 100 mL/hr at 05/03/25 0039    sodium chloride 0.9 % infusion, 20 mL/hr, Intravenous, Once PRN, Magali Lee MD    Laboratory Results:  Results from last 7 days   Lab Units 05/03/25  0521 05/02/25  0544 05/01/25  0612 04/30/25  1304 04/30/25  1144 04/29/25  0400 04/29/25  0348 04/28/25  0721 04/27/25  0524   WBC Thousand/uL  --  3.51*  3.51* 4.62  --  5.69  --  4.37 7.63 8.72   HEMOGLOBIN g/dL  --  10.1*  10.1* 10.2*  --  11.3*  --  13.3 12.6 12.0   HEMATOCRIT %  --  28.2*  28.2* 29.6*  --  32.4*  --  38.0 35.8* 33.9*   PLATELETS Thousands/uL  --  110*  110* 77*  --  67*  --  53* 53* 48*   SODIUM mmol/L 140 137 134* 141  --  143  --  141 142   POTASSIUM mmol/L 3.7 4.0 5.3 4.4  --  4.1  --  3.8 3.8   CHLORIDE mmol/L 103 102 100 102  --  103  --  105 106   CO2 mmol/L 30 31 28 28  --  28  --  30 30   BUN mg/dL 30* 32* 42* 49*  --  46*  --  43* 42*   CREATININE mg/dL 1.52* 1.37* 1.47* 1.74*  --  2.04*  --  1.68* 1.60*   CALCIUM mg/dL 7.8* 8.4 8.2* 9.1  --  9.6  --  9.5 9.5   MAGNESIUM mg/dL  --   --   --   --   --   --   --   --  1.8*   PHOSPHORUS mg/dL 2.7  --   --   --   --   --   --   --   --        CT abdomen pelvis wo contrast   Final Result by Isabel Barnes MD (04/30 1615)      No acute inflammatory process in the abdomen or pelvis.      Large amount of stool throughout the colon.      Workstation performed: [id number]         XR abdomen obstruction series   Final Result by Lonnie Oliva MD (04/30 0906)      1.  Large colonic stool burden without obstruction or other acute abdominal findings.      2.  No acute cardiopulmonary findings.         Resident: CINTHYA FAJARDO I, the attending radiologist, have reviewed the images and agree with the final report above.      Workstation performed: [id number]         RADIOLOGY RESULTS   Final Result by  (04/23 1108)      CT head wo contrast   Final Result by Satnam Riojas MD (04/21 0924)      1. Increasing ventricular  caliber without evidence of an acute, decompensated hydrocephalus. Recommend continued close follow-up.   2. Improving intraventricular blood products.      The study was marked in EPIC for immediate notification.                  Workstation performed: [id number]         CT head wo contrast   Final Result by Michael Campbell MD (04/20 9062)      1.  Interval removal of left frontal approach ventriculostomy catheter, noting stable size and configuration of the ventricular system, which contains intraventricular blood products. Blood products also noted along the site of the prior catheter tract.   2.  Refer to concurrent brain MRI for characterization of hypoattenuation corresponding to T2/FLAIR signal abnormality involving the left gangliocapsular region extending into the left frontal lobe inferiorly.      The study was marked in EPIC for immediate notification.            Workstation performed: [id number]         MRI Brain BT w wo Contrast   Final Result by Michael Campbell MD (04/20 0952)   Within the confines of a motion-degraded examination:      1.  Decreased enhancement and perfusion metrics associated with the left gangliocapsular mass (biopsy-proven lymphoma), noting decreased mass effect on the lateral ventricles and decreased transependymal flow of CSF. The change from MRI dated 4/11/2025    could be related to medical therapy. See narrative above for full discussion.   2.  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   3.  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 This could be further evaluated on nonemergent MRI pituitary protocol.      The study was marked in EPIC for immediate notification.      Workstation performed: [id number]         MRI thoracic spine w wo contrast   Final Result by Michael Campbell MD (04/20 3347)      1.  No evidence of lymphomatous involvement of the spine.    2.  Multilevel spondylotic changes, as detailed above. See narrative above for full details.   3.  Multiple large left thyroid nodules, for which further evaluation with thyroid sonogram is recommended if not previously performed.      The study was marked in EPIC for immediate notification.         Workstation performed: [id number]         MRI lumbar spine w wo contrast   Final Result by Michael Campbell MD (04/20 0957)      1.  No evidence of lymphomatous involvement of the spine.   2.  Multilevel spondylotic changes, as detailed above. See narrative above for full details.   3.  Multiple large left thyroid nodules, for which further evaluation with thyroid sonogram is recommended if not previously performed.      The study was marked in EPIC for immediate notification.         Workstation performed: [id number]         MRI cervical spine w wo contrast   Final Result by Michael Campbell MD (04/20 0957)      1.  No evidence of lymphomatous involvement of the spine.   2.  Multilevel spondylotic changes, as detailed above. See narrative above for full details.   3.  Multiple large left thyroid nodules, for which further evaluation with thyroid sonogram is recommended if not previously performed.      The study was marked in EPIC for immediate notification.         Workstation performed: [id number]         XR chest portable ICU   Final Result by Yamel Patton MD (04/21 0946)      No acute cardiopulmonary disease.      ET tube 3 cm above the arnav.            Workstation performed: [id number]         MRI follow up neuro   Final Result by Michael Campbell MD (04/18 1212)      Nondiagnostic examination. Recommend repeat examination, as clinically appropriate/tolerable.            Workstation performed: [id number]         CT head wo contrast   Final Result by Tressa Arshad MD (04/14 2030)      Left frontal approach ventriculostomy catheter terminating near the foramen of Monro with increased left frontal  lobe pneumocephalus and gas within the left frontal horn since prior study. Slightly increased small amount of layering intraventricular    hemorrhage within the left occipital horn. Improved hydrocephalus primarily of the left lateral ventricle since prior study.      Stable hyperdense mass centered within the left basal ganglia with surrounding vasogenic edema and mild rightward midline shift.      The study was marked in EPIC for immediate notification.         Workstation performed: [id number]         XR chest portable ICU   Final Result by Fareed Roman MD (04/14 0932)      No acute cardiopulmonary disease. Appropriately positioned endotracheal tube.            Workstation performed: [id number]         CT head wo contrast   Final Result by Len Justice MD (04/13 1722)      Status post placement of a left frontal approach ventriculostomy catheter with the tip terminating adjacent to the previously seen hyperdense mass near the foramen of De Oliveira. Small amount of intraventricular hemorrhage is noted within the left occipital    horn with overall stable degree of hydrocephalus.      Grossly stable hyperdense mass centered within the left basal ganglia. Correlate with prior contrast-enhanced MRI for additional findings.                  Workstation performed: [id number]         CT head wo contrast   Final Result by Daniel Gan MD (04/13 1326)      Predominantly left sided obstructive hydrocephalus from lesion located adjacent to the foramen of Monro is not significantly changed from 4/3/2025.      Blood products within the occipital horn of the left lateral ventricle is similar from MRI of the brain 4/11/2025.      Left anterior basal ganglia mass with surrounding edema, regional mass effect, and effacement of the left suprasellar cistern, appears similar to MRI of the brain 4/11/2025. Please see that report for further characterization of total extent of tumor.                  Resident: CINTHYA FAJARDO I, adwoa  attending radiologist, have reviewed the images and agree with the final report above.      Workstation performed: [id number]         MRI Brain BT w wo Contrast   Final Result by Pallav N Shah, MD (04/11 2032)   Addendum (preliminary) 1 of 1 by Pallav N Shah, MD (04/11 2032)   ADDENDUM:      I also verbally communicated these findings to the covering hospitalist,    Dr. Laurence Restrepo at 7:20 p.m.      Final   Interval enlargement of the dominant left anterior basal ganglionic mass lesion with greater surrounding vasogenic edema and mass effect. 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 Differential considerations include lymphoma, multicentric high-grade glioma, and less likely metastasis. Correlation with CSF cytology is recommended.      Small amount of intraventricular hemorrhage.      Interval progression of the previously noted neoplastic disease with the dominant mass in the left anterior basal ganglia and numerous growing ependymal and intraventricular nodular lesions.      The study was marked in EPIC for immediate notification.         Workstation performed: [id number]         FL lumbar puncture diagnostic   Final Result by Grant Galloway DO (04/07 1257)      Successful fluoroscopically guided lumbar puncture.         Workstation performed: [id number]         CT head wo contrast   Final Result by Miranda Burr MD (04/03 1124)      Redemonstrated hyperattenuating subependymal masses better characterized on recent MR. Interval increased ventricular caliber, left greater than right in comparison to recent CT dated 3/29/2025 concerning for worsening hydrocephalus. Recommend    neurosurgical consultation.      The study was marked in EPIC for immediate notification.         Workstation performed: [id number]         FL IN-patient lumbar puncture   Final Result by Elpidio Martinez MD (03/31 5587)       Successful fluoroscopically guided lumbar puncture.      Opening pressure: 15 cm of H20               Workstation performed: [id number]         MRI abdomen w wo contrast   Final Result by Oswaldo Sewell MD (03/31 1021)      1.  No suspicious hepatic lesions. There is a simple right hepatic lobe cyst.   2.  Mild diffuse hepatic steatosis.         Workstation performed: [id number]         CT head wo contrast   Final Result by Lonnie Arellano MD (03/29 2224)      Stable subependymal nodules and left foramen of De Oliveira region hyperdense lesion again seen with associated mild symmetrical dilatation of the left lateral ventricle and minimal left to right midline shift. The overall findings are unchanged compared to    the prior study. No acute intracranial hemorrhage or evidence of acute cerebral infarction.                  Workstation performed: [id number]         CTA chest abdomen pelvis w wo contrast   Final Result by Oswaldo Sewell MD (03/29 2126)      1.  There is a nonspecific 12 mm hypodense lesion within the right hepatic lobe. While this may represent a hemangioma, a metastatic lesion can not be excluded. This is suboptimally evaluated on the current exam, due to arterial timing of the contrast    bolus and background diffuse hepatic steatosis. Further evaluation by gadolinium enhanced MRI of the abdomen is recommended.   2.  Nonspecific 4 mm right lower lobe pulmonary nodule. In the setting of a known malignancy, a 3-month follow-up chest CT could be performed for further evaluation.   3.  Heterogeneous nodular enlargement of the left thyroid lobe. A nonemergent thyroid ultrasound follow-up is recommended.      4.  Please refer to the report body for description of other incidental, chronic and/or benign findings.      The study was marked in EPIC for significant notification.         Workstation performed: [id number]             Portions of the record may have been created with voice recognition  "software. Occasional wrong word or \"sound a like\" substitutions may have occurred due to the inherent limitations of voice recognition software. Read the chart carefully and recognize, using context, where substitutions have occurred.  "

## 2025-05-03 NOTE — ASSESSMENT & PLAN NOTE
Lab Results   Component Value Date    CREATININE 1.52 (H) 05/03/2025    CREATININE 1.37 (H) 05/02/2025    CREATININE 1.47 (H) 05/01/2025     Baseline creatinine around 0.9, peak creatinine 2.7  Patient was evaluated by nephrology during this hospital stay and given improvement of creatinine with IV fluids signed off on 4/25.  Reconsulted on 5/2 for monitoring post methotrexate  Creatinine has remained stable  Continue to monitor.  Avoid nephrotoxins

## 2025-05-03 NOTE — DISCHARGE SUPPORT
Case Management Assessment & Discharge Planning Note    Patient name Alexis Dennison  Location [facility] 904/[facility] 904-01 MRN [id number]  : 1960 Date 5/3/2025       Current Admission Date: 3/29/2025  Current Admission Diagnosis:Primary CNS lymphoma   Patient Active Problem List    Diagnosis Date Noted Date Diagnosed    E. coli UTI 2025     Sepsis (HCC) 2025     E coli bacteremia 2025     Metabolic alkalosis 2025     LETY (acute kidney injury) (HCC) 2025     Goals of care, counseling/discussion 2025     Palliative care encounter 2025     Primary CNS lymphoma 2025     Encephalopathy 2025     Constipation 2025     Ambulatory dysfunction 2025     Thyroid nodule 2025     Pulmonary nodule 2025     Liver lesion 2025     Primary central nervous system (CNS) lymphoma 2025     Type 2 diabetes mellitus with hyperglycemia, without long-term current use of insulin (HCC) 2022     HTN, goal below 130/80 2022     Mixed hyperlipidemia 2022     Vitamin D deficiency 2022     NAFLD (nonalcoholic fatty liver disease) 2022       LOS (days): 35  Geometric Mean LOS (GMLOS) (days): 11.1  Days to GMLOS:-23.3   Facility Authorization Approved  [facility] received approved auth for: Acute Rehab  Insurance: Other ([employer])  Determination made after peer to peer was completed?: Not applicable  Authorization Obtained Via: Fax  Facility Name: [facility]  Approved Authorization Number: [id number]  Start of Care Date: 25  Next Review Date: 25  Submit Next Review to: F: [phone]  Other Notes: Discharge Notification is required   notified: Monika BROWN     Updates to authorization status will be noted in chart.    Please reach out to CM for updates on any clinical information.

## 2025-05-03 NOTE — ASSESSMENT & PLAN NOTE
#Non-Oliguric severe KDIGO LETY stage 3 with evidence of kidney recovery  Etiology: Multifactorial likely secondary to hemodynamic changes  Baseline creatinine 0.93 mg/dL  Peak creatinine: 2.7 mg/dL  Renal function improved to 1.3 yesterday, slightly increased 1.5 today.    UA: Microhematuria, leukocyturia  Renal imaging : No hydronephrosis  Treatment:  No indication of dialysis, continue to closely monitor BMP.  Avoid hypotension, no NSAIDs, no IV contrast   Status post first dose of methotrexate on 4/17/2025 and second dose 5/2/2025.  Continue alkalinization of the urine with bicarbonate drip check urine analysis every 6 hours.  Noted venous pH 7.48, urine pH 8.0  Monitor methotrexate level as per oncology  Monitor renal function closely while on methotrexate.  Serum uric acid 2.1, ionized calcium 1.0.  Phosphorus 2.7.

## 2025-05-04 LAB
ANION GAP SERPL CALCULATED.3IONS-SCNC: 6 MMOL/L (ref 4–13)
BACTERIA BLD CULT: NORMAL
BACTERIA UR QL AUTO: ABNORMAL /HPF
BACTERIA UR QL AUTO: ABNORMAL /HPF
BACTERIA UR QL AUTO: NORMAL /HPF
BACTERIA UR QL AUTO: NORMAL /HPF
BASE EX.OXY STD BLDV CALC-SCNC: 86.1 % (ref 60–80)
BASE EXCESS BLDV CALC-SCNC: 5.1 MMOL/L
BILIRUB UR QL STRIP: NEGATIVE
BUN SERPL-MCNC: 24 MG/DL (ref 5–25)
CALCIUM SERPL-MCNC: 7.8 MG/DL (ref 8.4–10.2)
CHLORIDE SERPL-SCNC: 102 MMOL/L (ref 96–108)
CLARITY UR: ABNORMAL
CLARITY UR: CLEAR
CO2 SERPL-SCNC: 33 MMOL/L (ref 21–32)
COLOR UR: ABNORMAL
COLOR UR: COLORLESS
CREAT SERPL-MCNC: 1.27 MG/DL (ref 0.6–1.3)
CRYSTALS URNS QL MICRO: ABNORMAL /HPF
GFR SERPL CREATININE-BSD FRML MDRD: 58 ML/MIN/1.73SQ M
GLUCOSE SERPL-MCNC: 190 MG/DL (ref 65–140)
GLUCOSE SERPL-MCNC: 190 MG/DL (ref 65–140)
GLUCOSE SERPL-MCNC: 208 MG/DL (ref 65–140)
GLUCOSE SERPL-MCNC: 213 MG/DL (ref 65–140)
GLUCOSE SERPL-MCNC: 245 MG/DL (ref 65–140)
GLUCOSE UR STRIP-MCNC: ABNORMAL MG/DL
HCO3 BLDV-SCNC: 29.3 MMOL/L (ref 24–30)
HGB UR QL STRIP.AUTO: ABNORMAL
KETONES UR STRIP-MCNC: NEGATIVE MG/DL
LEUKOCYTE ESTERASE UR QL STRIP: NEGATIVE
MTX SERPL-SCNC: <0.1 UMOL/L
NITRITE UR QL STRIP: NEGATIVE
NON-SQ EPI CELLS URNS QL MICRO: ABNORMAL /HPF
NON-SQ EPI CELLS URNS QL MICRO: ABNORMAL /HPF
NON-SQ EPI CELLS URNS QL MICRO: NORMAL /HPF
NON-SQ EPI CELLS URNS QL MICRO: NORMAL /HPF
O2 CT BLDV-SCNC: 12.6 ML/DL
PCO2 BLDV: 41.6 MM HG (ref 42–50)
PH BLDV: 7.46 [PH] (ref 7.3–7.4)
PH UR STRIP.AUTO: 7.5 [PH]
PH UR STRIP.AUTO: 8 [PH]
PHOSPHATE SERPL-MCNC: 2.5 MG/DL (ref 2.3–4.1)
PO2 BLDV: 55 MM HG (ref 35–45)
POTASSIUM SERPL-SCNC: 3.8 MMOL/L (ref 3.5–5.3)
PROT UR STRIP-MCNC: ABNORMAL MG/DL
PROT UR STRIP-MCNC: ABNORMAL MG/DL
PROT UR STRIP-MCNC: NEGATIVE MG/DL
PROT UR STRIP-MCNC: NEGATIVE MG/DL
RBC #/AREA URNS AUTO: ABNORMAL /HPF
RBC #/AREA URNS AUTO: ABNORMAL /HPF
RBC #/AREA URNS AUTO: NORMAL /HPF
RBC #/AREA URNS AUTO: NORMAL /HPF
SODIUM SERPL-SCNC: 141 MMOL/L (ref 135–147)
SP GR UR STRIP.AUTO: 1 (ref 1–1.03)
SP GR UR STRIP.AUTO: 1.01 (ref 1–1.03)
URATE SERPL-MCNC: 2.1 MG/DL (ref 3.5–8.5)
UROBILINOGEN UR STRIP-ACNC: <2 MG/DL
WBC #/AREA URNS AUTO: ABNORMAL /HPF
WBC #/AREA URNS AUTO: ABNORMAL /HPF
WBC #/AREA URNS AUTO: NORMAL /HPF
WBC #/AREA URNS AUTO: NORMAL /HPF

## 2025-05-04 PROCEDURE — 84550 ASSAY OF BLOOD/URIC ACID: CPT | Performed by: INTERNAL MEDICINE

## 2025-05-04 PROCEDURE — 82805 BLOOD GASES W/O2 SATURATION: CPT | Performed by: INTERNAL MEDICINE

## 2025-05-04 PROCEDURE — 99232 SBSQ HOSP IP/OBS MODERATE 35: CPT | Performed by: INTERNAL MEDICINE

## 2025-05-04 PROCEDURE — 99233 SBSQ HOSP IP/OBS HIGH 50: CPT | Performed by: INTERNAL MEDICINE

## 2025-05-04 PROCEDURE — 81001 URINALYSIS AUTO W/SCOPE: CPT | Performed by: STUDENT IN AN ORGANIZED HEALTH CARE EDUCATION/TRAINING PROGRAM

## 2025-05-04 PROCEDURE — 99232 SBSQ HOSP IP/OBS MODERATE 35: CPT | Performed by: STUDENT IN AN ORGANIZED HEALTH CARE EDUCATION/TRAINING PROGRAM

## 2025-05-04 PROCEDURE — 82948 REAGENT STRIP/BLOOD GLUCOSE: CPT

## 2025-05-04 PROCEDURE — 80048 BASIC METABOLIC PNL TOTAL CA: CPT | Performed by: INTERNAL MEDICINE

## 2025-05-04 PROCEDURE — 84100 ASSAY OF PHOSPHORUS: CPT | Performed by: INTERNAL MEDICINE

## 2025-05-04 RX ORDER — LEUCOVORIN CALCIUM 25 MG/1
25 TABLET ORAL EVERY 6 HOURS
Status: DISCONTINUED | OUTPATIENT
Start: 2025-05-05 | End: 2025-05-04

## 2025-05-04 RX ADMIN — CEFAZOLIN SODIUM 2000 MG: 2 SOLUTION INTRAVENOUS at 02:48

## 2025-05-04 RX ADMIN — DEXAMETHASONE 4 MG: 4 TABLET ORAL at 22:02

## 2025-05-04 RX ADMIN — ALLOPURINOL 300 MG: 300 TABLET ORAL at 08:56

## 2025-05-04 RX ADMIN — Medication 6 MG: at 22:02

## 2025-05-04 RX ADMIN — LEUCOVORIN CALCIUM 25 MG: 50 INJECTION, POWDER, LYOPHILIZED, FOR SOLUTION INTRAMUSCULAR; INTRAVENOUS at 04:15

## 2025-05-04 RX ADMIN — INSULIN LISPRO 2 UNITS: 100 INJECTION, SOLUTION INTRAVENOUS; SUBCUTANEOUS at 08:56

## 2025-05-04 RX ADMIN — LEUCOVORIN CALCIUM 25 MG: 50 INJECTION, POWDER, LYOPHILIZED, FOR SOLUTION INTRAMUSCULAR; INTRAVENOUS at 15:43

## 2025-05-04 RX ADMIN — QUETIAPINE FUMARATE 50 MG: 25 TABLET ORAL at 22:01

## 2025-05-04 RX ADMIN — INSULIN GLARGINE 10 UNITS: 100 INJECTION, SOLUTION SUBCUTANEOUS at 22:03

## 2025-05-04 RX ADMIN — HEPARIN SODIUM 5000 UNITS: 5000 INJECTION INTRAVENOUS; SUBCUTANEOUS at 14:06

## 2025-05-04 RX ADMIN — QUETIAPINE FUMARATE 12.5 MG: 25 TABLET ORAL at 11:51

## 2025-05-04 RX ADMIN — PANTOPRAZOLE SODIUM 40 MG: 40 TABLET, DELAYED RELEASE ORAL at 05:22

## 2025-05-04 RX ADMIN — INSULIN LISPRO 5 UNITS: 100 INJECTION, SOLUTION INTRAVENOUS; SUBCUTANEOUS at 08:56

## 2025-05-04 RX ADMIN — INSULIN LISPRO 4 UNITS: 100 INJECTION, SOLUTION INTRAVENOUS; SUBCUTANEOUS at 22:02

## 2025-05-04 RX ADMIN — CEFAZOLIN SODIUM 2000 MG: 2 SOLUTION INTRAVENOUS at 18:41

## 2025-05-04 RX ADMIN — CHLORHEXIDINE GLUCONATE 0.12% ORAL RINSE 15 ML: 1.2 LIQUID ORAL at 22:01

## 2025-05-04 RX ADMIN — DEXAMETHASONE 4 MG: 4 TABLET ORAL at 05:22

## 2025-05-04 RX ADMIN — HEPARIN SODIUM 5000 UNITS: 5000 INJECTION INTRAVENOUS; SUBCUTANEOUS at 05:22

## 2025-05-04 RX ADMIN — LEUCOVORIN CALCIUM 25 MG: 50 INJECTION, POWDER, LYOPHILIZED, FOR SOLUTION INTRAMUSCULAR; INTRAVENOUS at 09:58

## 2025-05-04 RX ADMIN — HEPARIN SODIUM 5000 UNITS: 5000 INJECTION INTRAVENOUS; SUBCUTANEOUS at 22:01

## 2025-05-04 RX ADMIN — INSULIN LISPRO 5 UNITS: 100 INJECTION, SOLUTION INTRAVENOUS; SUBCUTANEOUS at 11:52

## 2025-05-04 RX ADMIN — DEXAMETHASONE 4 MG: 4 TABLET ORAL at 14:06

## 2025-05-04 RX ADMIN — CHLORHEXIDINE GLUCONATE 0.12% ORAL RINSE 15 ML: 1.2 LIQUID ORAL at 08:56

## 2025-05-04 RX ADMIN — SODIUM BICARBONATE 100 ML/HR: 84 INJECTION INTRAVENOUS at 08:59

## 2025-05-04 RX ADMIN — ATORVASTATIN CALCIUM 20 MG: 20 TABLET, FILM COATED ORAL at 08:56

## 2025-05-04 RX ADMIN — CEFAZOLIN SODIUM 2000 MG: 2 SOLUTION INTRAVENOUS at 11:53

## 2025-05-04 RX ADMIN — LEUCOVORIN CALCIUM 25 MG: 50 INJECTION, POWDER, LYOPHILIZED, FOR SOLUTION INTRAMUSCULAR; INTRAVENOUS at 22:09

## 2025-05-04 RX ADMIN — INSULIN LISPRO 4 UNITS: 100 INJECTION, SOLUTION INTRAVENOUS; SUBCUTANEOUS at 17:12

## 2025-05-04 RX ADMIN — INSULIN LISPRO 5 UNITS: 100 INJECTION, SOLUTION INTRAVENOUS; SUBCUTANEOUS at 17:12

## 2025-05-04 RX ADMIN — INSULIN LISPRO 4 UNITS: 100 INJECTION, SOLUTION INTRAVENOUS; SUBCUTANEOUS at 11:52

## 2025-05-04 NOTE — PROGRESS NOTES
NEPHROLOGY PROGRESS NOTE   Alexis Dennison 64 y.o. male MRN: [id number]  Unit/Bed#: [facility] 904-01 Encounter: [id number]  Reason for Consult: LETY    ASSESSMENT AND PLAN:  Assessment & Plan  LETY (acute kidney injury) (HCC)  #Non-Oliguric severe KDIGO LETY stage 3 with evidence of kidney recovery  Etiology: Multifactorial likely secondary to hemodynamic changes  Baseline creatinine 0.93 mg/dL  Peak creatinine: 2.7 mg/dL  Renal function improved 1.2 today.     UA: Microhematuria, leukocyturia  Renal imaging : No hydronephrosis  Treatment:  No indication of dialysis, continue to closely monitor BMP.  Avoid hypotension, no NSAIDs, no IV contrast   Status post first dose of methotrexate on 4/17/2025 and second dose 5/2/2025.  Continue alkalinization of the urine with bicarbonate drip check urine analysis every 6 hours.  Noted venous pH 7. 46, urine pH 7.5  Monitor methotrexate level as per oncology  Monitor renal function closely while on methotrexate.  Serum uric acid 2.1, serum calcium 7.8, phosphorus 2.5.   Follow methotrexate level which was drawn yesterday.  HTN, goal below 130/80  Blood pressure acceptable  Primary CNS lymphoma  Status post methotrexate  Alkalinization of the urine with a bicarbonate drip for methotrexate  Continue to be confused, 1:1\  Metabolic alkalosis  Iatrogenic in the setting of a bicarbonate drip  Bicarbonate level 33  Check urine analysis every 6 hours  Check daily VBG       Discussed above plan in detail with primary team regarding monitoring BMP, continue bicarb drip and they agree with above recommendations.      SUBJECTIVE:  Patient seen and examined at bedside.  Remains on one-to-one    OBJECTIVE:  Current Weight: Weight - Scale: 75.5 kg (166 lb 7.2 oz)  Vitals:    05/04/25 1116   BP: 135/74   Pulse: 63   Resp:    Temp: 98.1 °F (36.7 °C)   SpO2: 98%       Intake/Output Summary (Last 24 hours) at 5/4/2025 1256  Last data filed at 5/4/2025 1153  Gross per 24 hour   Intake 2306.66 ml   Output  5075 ml   Net -2768.34 ml     Wt Readings from Last 3 Encounters:   05/02/25 75.5 kg (166 lb 7.2 oz)   04/16/25 86.2 kg (190 lb 0.6 oz)   03/27/25 86 kg (189 lb 9.6 oz)     Temp Readings from Last 3 Encounters:   05/04/25 98.1 °F (36.7 °C)   03/24/25 98.3 °F (36.8 °C) (Oral)   06/07/23 98.3 °F (36.8 °C)     BP Readings from Last 3 Encounters:   05/04/25 135/74   03/27/25 108/70   03/24/25 126/66     Pulse Readings from Last 3 Encounters:   05/04/25 63   03/27/25 76   03/24/25 67        Physical Examination:  Eyes: Mild conjunctival pallor present  Neck: No obvious lymphadenopathy appreciated  Respiratory: Bilateral air entry present  CVS: No significant edema  GI: Soft, nondistended  CNS: Active, alert, confused  Skin: No new rash  Musculoskeletal: No obvious new gross deformity noted    Medications:    Current Facility-Administered Medications:     acetaminophen (TYLENOL) tablet 650 mg, 650 mg, Oral, Q6H PRN, Rustam Drummond MD    allopurinol (ZYLOPRIM) tablet 300 mg, 300 mg, Oral, Daily, Rustam Drummond MD, 300 mg at 05/04/25 0856    alteplase (CATHFLO) injection 2 mg, 2 mg, Intracatheter, Q1MIN PRN, Eva Ortiz MD    aluminum-magnesium hydroxide-simethicone (MAALOX) oral suspension 30 mL, 30 mL, Oral, Q4H PRN, Rustam Drummond MD    atorvastatin (LIPITOR) tablet 20 mg, 20 mg, Oral, Daily, Rustam Drummond MD, 20 mg at 05/04/25 0856    bisacodyl (DULCOLAX) rectal suppository 10 mg, 10 mg, Rectal, Daily, Rustam Drummond MD, 10 mg at 05/01/25 1259    calcium carbonate (TUMS) chewable tablet 1,000 mg, 1,000 mg, Oral, Daily PRN, Rustam Drummond MD    ceFAZolin (ANCEF) IVPB (premix in dextrose) 2,000 mg 50 mL, 2,000 mg, Intravenous, Q8H, Lisa Singh MD, Last Rate: 100 mL/hr at 05/04/25 1153, 2,000 mg at 05/04/25 1153    chlorhexidine (PERIDEX) 0.12 % oral rinse 15 mL, 15 mL, Mouth/Throat, Q12H [facility], Rustam Drummond MD, 15 mL at 05/04/25 0856    dexamethasone  (DECADRON) tablet 4 mg, 4 mg, Oral, Q8H [facility], Dionne Huang MD, 4 mg at 05/04/25 0522    heparin (porcine) subcutaneous injection 5,000 Units, 5,000 Units, Subcutaneous, Q8H CAIT, Rustam Drummond MD, 5,000 Units at 05/04/25 0522    insulin glargine (LANTUS) subcutaneous injection 10 Units 0.1 mL, 10 Units, Subcutaneous, HS, Rustam Drummond MD, 10 Units at 05/03/25 2017    insulin lispro (HumALOG/ADMELOG) 100 units/mL subcutaneous injection 2-12 Units, 2-12 Units, Subcutaneous, 4x Daily (AC & HS), 4 Units at 05/04/25 1152 **AND** Fingerstick Glucose (POCT), , , 4x Daily AC and at bedtime, Dionne Huang MD    insulin lispro (HumALOG/ADMELOG) 100 units/mL subcutaneous injection 5 Units, 5 Units, Subcutaneous, TID With Meals, Dionne Huang MD, 5 Units at 05/04/25 1152    leucovorin 25 mg in dextrose 5 % 50 mL IVPB, 25 mg, Intravenous, Q6H, Dionne Huang MD, Last Rate: 210 mL/hr at 05/04/25 0958, 25 mg at 05/04/25 0958    melatonin tablet 6 mg, 6 mg, Oral, HS, Rustam Drummond MD, 6 mg at 05/03/25 2017    OLANZapine (ZyPREXA) IM injection 5 mg, 5 mg, Intramuscular, BID PRN, Dionne Huang MD, 5 mg at 05/01/25 1122    ondansetron (ZOFRAN) injection 4 mg, 4 mg, Intravenous, Q6H PRN, Rustam Drummond MD, 4 mg at 04/14/25 0930    oxyCODONE (ROXICODONE) split tablet 2.5 mg, 2.5 mg, Oral, Q4H PRN **OR** oxyCODONE (ROXICODONE) IR tablet 5 mg, 5 mg, Oral, Q4H PRN, Rustam Drummond MD, 5 mg at 04/29/25 1351    pantoprazole (PROTONIX) EC tablet 40 mg, 40 mg, Oral, Early Morning, Rustam Drummond MD, 40 mg at 05/04/25 0522    QUEtiapine (SEROquel) tablet 12.5 mg, 12.5 mg, Oral, Daily, Reji Kay MD, 12.5 mg at 05/04/25 1151    QUEtiapine (SEROquel) tablet 50 mg, 50 mg, Oral, HS, Rylie Stapleton PA-C, 50 mg at 05/03/25 2113    senna (SENOKOT) tablet 17.2 mg, 2 tablet, Oral, HS, Rustam Drummond MD, 17.2 mg at 05/01/25 5695    sodium bicarbonate 100 mEq in dextrose  5 % 1,000 mL infusion, 100 mL/hr, Intravenous, Continuous, Bryson Garcia DO, Last Rate: 100 mL/hr at 05/04/25 0859, 100 mL/hr at 05/04/25 0859    sodium chloride 0.9 % infusion, 20 mL/hr, Intravenous, Once PRN, Magali Lee MD    Laboratory Results:  Results from last 7 days   Lab Units 05/04/25  0533 05/03/25  1555 05/03/25  0521 05/02/25  0544 05/01/25  0612 04/30/25  1304 04/30/25  1144 04/29/25  0400 04/29/25  0348 04/28/25  0721   WBC Thousand/uL  --  3.01*  --  3.51*  3.51* 4.62  --  5.69  --  4.37 7.63   HEMOGLOBIN g/dL  --  9.4*  --  10.1*  10.1* 10.2*  --  11.3*  --  13.3 12.6   HEMATOCRIT %  --  26.1*  --  28.2*  28.2* 29.6*  --  32.4*  --  38.0 35.8*   PLATELETS Thousands/uL  --  146*  --  110*  110* 77*  --  67*  --  53* 53*   SODIUM mmol/L 141  --  140 137 134* 141  --  143  --  141   POTASSIUM mmol/L 3.8  --  3.7 4.0 5.3 4.4  --  4.1  --  3.8   CHLORIDE mmol/L 102  --  103 102 100 102  --  103  --  105   CO2 mmol/L 33*  --  30 31 28 28  --  28  --  30   BUN mg/dL 24  --  30* 32* 42* 49*  --  46*  --  43*   CREATININE mg/dL 1.27  --  1.52* 1.37* 1.47* 1.74*  --  2.04*  --  1.68*   CALCIUM mg/dL 7.8*  --  7.8* 8.4 8.2* 9.1  --  9.6  --  9.5   PHOSPHORUS mg/dL 2.5  --  2.7  --   --   --   --   --   --   --        CT abdomen pelvis wo contrast   Final Result by Isabel Barnes MD (04/30 1615)      No acute inflammatory process in the abdomen or pelvis.      Large amount of stool throughout the colon.      Workstation performed: [id number]         XR abdomen obstruction series   Final Result by Lonnie Oliva MD (04/30 0906)      1.  Large colonic stool burden without obstruction or other acute abdominal findings.      2.  No acute cardiopulmonary findings.         Resident: CINTHYA FAJARDO I, the attending radiologist, have reviewed the images and agree with the final report above.      Workstation performed: [id number]         RADIOLOGY RESULTS   Final Result by  (04/23 1108)      CT head  wo contrast   Final Result by Satnam Riojas MD (04/21 0924)      1. Increasing ventricular caliber without evidence of an acute, decompensated hydrocephalus. Recommend continued close follow-up.   2. Improving intraventricular blood products.      The study was marked in EPIC for immediate notification.                  Workstation performed: [id number]         CT head wo contrast   Final Result by Michael Campbell MD (04/20 0748)      1.  Interval removal of left frontal approach ventriculostomy catheter, noting stable size and configuration of the ventricular system, which contains intraventricular blood products. Blood products also noted along the site of the prior catheter tract.   2.  Refer to concurrent brain MRI for characterization of hypoattenuation corresponding to T2/FLAIR signal abnormality involving the left gangliocapsular region extending into the left frontal lobe inferiorly.      The study was marked in EPIC for immediate notification.            Workstation performed: [id number]         MRI Brain BT w wo Contrast   Final Result by Michael Campbell MD (04/20 0937)   Within the confines of a motion-degraded examination:      1.  Decreased enhancement and perfusion metrics associated with the left gangliocapsular mass (biopsy-proven lymphoma), noting decreased mass effect on the lateral ventricles and decreased transependymal flow of CSF. The change from MRI dated 4/11/2025    could be related to medical therapy. See narrative above for full discussion.   2.  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   3.  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 This could be further evaluated on nonemergent MRI pituitary protocol.      The study was marked in EPIC for immediate notification.      Workstation performed: [id number]         MRI thoracic spine w wo contrast   Final Result  by Michael Campbell MD (04/20 0957)      1.  No evidence of lymphomatous involvement of the spine.   2.  Multilevel spondylotic changes, as detailed above. See narrative above for full details.   3.  Multiple large left thyroid nodules, for which further evaluation with thyroid sonogram is recommended if not previously performed.      The study was marked in EPIC for immediate notification.         Workstation performed: [id number]         MRI lumbar spine w wo contrast   Final Result by Michael Campbell MD (04/20 0957)      1.  No evidence of lymphomatous involvement of the spine.   2.  Multilevel spondylotic changes, as detailed above. See narrative above for full details.   3.  Multiple large left thyroid nodules, for which further evaluation with thyroid sonogram is recommended if not previously performed.      The study was marked in EPIC for immediate notification.         Workstation performed: [id number]         MRI cervical spine w wo contrast   Final Result by Michael Campebll MD (04/20 0957)      1.  No evidence of lymphomatous involvement of the spine.   2.  Multilevel spondylotic changes, as detailed above. See narrative above for full details.   3.  Multiple large left thyroid nodules, for which further evaluation with thyroid sonogram is recommended if not previously performed.      The study was marked in EPIC for immediate notification.         Workstation performed: [id number]         XR chest portable ICU   Final Result by Yamel Patton MD (04/21 0946)      No acute cardiopulmonary disease.      ET tube 3 cm above the arnav.            Workstation performed: [id number]         MRI follow up neuro   Final Result by Michael Campbell MD (04/18 1212)      Nondiagnostic examination. Recommend repeat examination, as clinically appropriate/tolerable.            Workstation performed: [id number]         CT head wo contrast   Final Result by Tressa Arshad MD (04/14 2030)      Left frontal  approach ventriculostomy catheter terminating near the foramen of Monro with increased left frontal lobe pneumocephalus and gas within the left frontal horn since prior study. Slightly increased small amount of layering intraventricular    hemorrhage within the left occipital horn. Improved hydrocephalus primarily of the left lateral ventricle since prior study.      Stable hyperdense mass centered within the left basal ganglia with surrounding vasogenic edema and mild rightward midline shift.      The study was marked in EPIC for immediate notification.         Workstation performed: [id number]         XR chest portable ICU   Final Result by Fareed Roman MD (04/14 0932)      No acute cardiopulmonary disease. Appropriately positioned endotracheal tube.            Workstation performed: [id number]         CT head wo contrast   Final Result by Len Justice MD (04/13 1722)      Status post placement of a left frontal approach ventriculostomy catheter with the tip terminating adjacent to the previously seen hyperdense mass near the foramen of De Oliveira. Small amount of intraventricular hemorrhage is noted within the left occipital    horn with overall stable degree of hydrocephalus.      Grossly stable hyperdense mass centered within the left basal ganglia. Correlate with prior contrast-enhanced MRI for additional findings.                  Workstation performed: [id number]         CT head wo contrast   Final Result by Daniel Gan MD (04/13 1326)      Predominantly left sided obstructive hydrocephalus from lesion located adjacent to the foramen of Monro is not significantly changed from 4/3/2025.      Blood products within the occipital horn of the left lateral ventricle is similar from MRI of the brain 4/11/2025.      Left anterior basal ganglia mass with surrounding edema, regional mass effect, and effacement of the left suprasellar cistern, appears similar to MRI of the brain 4/11/2025. Please see that report for  further characterization of total extent of tumor.                  Resident: CINTHYA FAJARDO I, the attending radiologist, have reviewed the images and agree with the final report above.      Workstation performed: [id number]         MRI Brain BT w wo Contrast   Final Result by Pallav N Shah, MD (04/11 2032)   Addendum (preliminary) 1 of 1 by Pallav N Shah, MD (04/11 2032)   ADDENDUM:      I also verbally communicated these findings to the covering hospitalist,    Dr. Laurence Restrepo at 7:20 p.m.      Final   Interval enlargement of the dominant left anterior basal ganglionic mass lesion with greater surrounding vasogenic edema and mass effect. 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 Differential considerations include lymphoma, multicentric high-grade glioma, and less likely metastasis. Correlation with CSF cytology is recommended.      Small amount of intraventricular hemorrhage.      Interval progression of the previously noted neoplastic disease with the dominant mass in the left anterior basal ganglia and numerous growing ependymal and intraventricular nodular lesions.      The study was marked in EPIC for immediate notification.         Workstation performed: [id number]         FL lumbar puncture diagnostic   Final Result by Grant Galloway DO (04/07 1257)      Successful fluoroscopically guided lumbar puncture.         Workstation performed: [id number]         CT head wo contrast   Final Result by Miranda Burr MD (04/03 1124)      Redemonstrated hyperattenuating subependymal masses better characterized on recent MR. Interval increased ventricular caliber, left greater than right in comparison to recent CT dated 3/29/2025 concerning for worsening hydrocephalus. Recommend    neurosurgical consultation.      The study was marked in EPIC for immediate notification.         Workstation performed:  [id number]         FL IN-patient lumbar puncture   Final Result by Elpidio Martinez MD (03/31 1448)      Successful fluoroscopically guided lumbar puncture.      Opening pressure: 15 cm of H20               Workstation performed: [id number]         MRI abdomen w wo contrast   Final Result by Oswaldo Sewell MD (03/31 1021)      1.  No suspicious hepatic lesions. There is a simple right hepatic lobe cyst.   2.  Mild diffuse hepatic steatosis.         Workstation performed: [id number]         CT head wo contrast   Final Result by Lonnie Arellano MD (03/29 2224)      Stable subependymal nodules and left foramen of De Oliveira region hyperdense lesion again seen with associated mild symmetrical dilatation of the left lateral ventricle and minimal left to right midline shift. The overall findings are unchanged compared to    the prior study. No acute intracranial hemorrhage or evidence of acute cerebral infarction.                  Workstation performed: [id number]         CTA chest abdomen pelvis w wo contrast   Final Result by Oswaldo Sewell MD (03/29 2126)      1.  There is a nonspecific 12 mm hypodense lesion within the right hepatic lobe. While this may represent a hemangioma, a metastatic lesion can not be excluded. This is suboptimally evaluated on the current exam, due to arterial timing of the contrast    bolus and background diffuse hepatic steatosis. Further evaluation by gadolinium enhanced MRI of the abdomen is recommended.   2.  Nonspecific 4 mm right lower lobe pulmonary nodule. In the setting of a known malignancy, a 3-month follow-up chest CT could be performed for further evaluation.   3.  Heterogeneous nodular enlargement of the left thyroid lobe. A nonemergent thyroid ultrasound follow-up is recommended.      4.  Please refer to the report body for description of other incidental, chronic and/or benign findings.      The study was marked in EPIC for significant notification.        "  Workstation performed: [id number]             Portions of the record may have been created with voice recognition software. Occasional wrong word or \"sound a like\" substitutions may have occurred due to the inherent limitations of voice recognition software. Read the chart carefully and recognize, using context, where substitutions have occurred.  "

## 2025-05-04 NOTE — ASSESSMENT & PLAN NOTE
"Patient with development of worsening confusion, recently seen at the [facility] ED on 3/24/2025 with CT head showing brain lesion   MRI of the brain 3/26/2025 concerning for \"multiple scattered areas of subependymoma nodular enhancement throughout ventricles with mild hydrocephalus left worse than right, scattered nodular areas of enhancement in left anterior inferior basal ganglia involving left anterior commissure, and smaller foci of nodular enhancement along anteromedial aspect of the left cerebral peduncle and left quirino.\"  With brain compression  (minimal left to right shift), mild hydrocephalus as evidenced by initial imaging  S/p LP on 3/31. 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  Lymphoma/leukemia panel nondiagnostic  CT head on 4/3 with interval increase in ventricular caliber concerning for worsening hydrocephalus. Repeat LP on 4/7 was again undiagnostic.   MRI of the brain from 4/11-\"Interval enlargement of the dominant left anterior basal ganglionic mass lesion with greater surrounding vasogenic edema and mass effect. 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  S/p EVD, self removed over 4/26 weekend  Completed keppra 500mg BID x 7 days for postoperative seizure ppx per neurosurgery  Patient started on chemotherapy while inpatient.  Discussed with oncology and plan for rituximab infusion weekly and methotrexate every 2 weeks.  Patient will require daily serum methotrexate levels until less than 0.1.  Also recommend dexamethasone taper as follows:4 mg Q8H through 5/5, then 4 mg BID x4 days, then 2 mg BID x4 days, then 1 mg BID x4 days, then 1 mg daily x4 days, then discontinue.  Maintain PICC line  Patient medically stable for rehab.  Insurance Auth obtained, awaiting bed  "

## 2025-05-04 NOTE — ASSESSMENT & PLAN NOTE
Iatrogenic in the setting of a bicarbonate drip  Bicarbonate level 33  Check urine analysis every 6 hours  Check daily VBG

## 2025-05-04 NOTE — PROGRESS NOTES
Progress Note - Oncology-Medical   Name: Alexis Dennison 64 y.o. male I MRN: [id number]  Unit/Bed#: [facility] 904-01 I Date of Admission: 3/29/2025   Date of Service: 5/4/2025 I Hospital Day: 36    Assessment & Plan  Primary CNS lymphoma  64 yoM with PMHx of DM2, NAFLD, and HT who presented with progressive encephalopathy found to have multiple scattered nodular cerebral and cerebellar enhancement who underwent two non-diagnostic LPs prompting stereotactic brain biopsy (4/14) which showed large B-cell lymphoma.  See oncology progress note on 4/21 for full diagnostic work up.  On 4/29, agitation led to infectious work up with showed UA+ and BCx with 2/2 E. Coli sensitive to ceftriaxone.  ID was consulted is managing and has approved to restart chemo 5/2.    Treatment Plan: regimen modified from (https://pubmed.ncbi.nlm.nih.gov/65961023/)  High-dose methotrexate every 2 weeks x4 cycles followed by every 4 weeks maintenance (could consider dosing every 4 weeks if he discharges to outside rehab)  C1 (4/17) 8 g/m²  C2 (5/2) 3 g/m², dose-reduced due to LETY, delayed x1 day due to E. coli bacteremia   UA Q6H to assess alkalinization of urine (goal urine pH>7.0)  HCO3 100 mEq at 150 mL/hr  Methotrexate levels every 24 hours beginning 24 hours from the start of his infusion.  MTX levels drawn on 5/3/2025 at 3:55 PM.  Results remain pending.  Rituximab (375 mg/m²) weekly x8 weeks  C1D1 (4/18)  C1D8 (4/24)  C2D1 (5/3), ok to treat  C2D8 (5/10), planned  Re-consulted Nephrology given LETY with first cycle, appreciate recs  Final path is pending from biopsy (4/14)  E coli bacteremia  Patient met sepsis criteria on 4/29 with accompanying confusion and agitation.  UA was positive, UCx and BCx showed E. Coli sensitive to cephalosporins.  ID was consulted, is managing, and has approved to restarting chemo 5/2.  Pulmonary nodule  CTA 3/29 with nonspecific 4 mm RLL pulm nodule, recommendation for 3 month follow up  Liver lesion  CTA 3/29 with  nonspecific 12mm hypodense right hepatic lesion, recommended MRI abdomen  MRI 3/30 with no suspicious hepatic lesions, demonstrated simple right hepatic lobe cyst    Subjective   No acute events noted overnight.  Continues on 1: 1.  AAO x 1-2.  MTX levels pending.    Objective :  Temp:  [97.4 °F (36.3 °C)-98.4 °F (36.9 °C)] 97.8 °F (36.6 °C)  HR:  [57-67] 57  BP: (126-149)/(71-80) 141/75  Resp:  [16-20] 20  SpO2:  [93 %-98 %] 94 %  O2 Device: None (Room air)      Physical Exam  Vitals and nursing note reviewed.   Constitutional:       General: He is not in acute distress.     Appearance: He is well-developed.   HENT:      Head: Normocephalic and atraumatic.   Eyes:      Conjunctiva/sclera: Conjunctivae normal.   Cardiovascular:      Rate and Rhythm: Normal rate.   Pulmonary:      Effort: Pulmonary effort is normal. No respiratory distress.   Abdominal:      Palpations: Abdomen is soft.      Tenderness: There is no abdominal tenderness.   Musculoskeletal:         General: No swelling.      Cervical back: Neck supple.   Skin:     General: Skin is warm and dry.   Neurological:      Mental Status: He is alert.      Gait: Gait normal.      Comments: Confused.   1:1    Psychiatric:         Mood and Affect: Mood normal.           Lab Results: I have reviewed the following results:  Lab Results   Component Value Date    K 3.8 05/04/2025     05/04/2025    CO2 33 (H) 05/04/2025    BUN 24 05/04/2025    CREATININE 1.27 05/04/2025    GLUF 131 (H) 02/22/2025    CALCIUM 7.8 (L) 05/04/2025    CORRECTEDCA 10.2 (H) 04/27/2025    AST 15 04/29/2025    ALT 62 (H) 04/29/2025    ALKPHOS 64 04/29/2025    EGFR 58 05/04/2025     Lab Results   Component Value Date    WBC 3.01 (L) 05/03/2025    HGB 9.4 (L) 05/03/2025    HCT 26.1 (L) 05/03/2025    MCV 87 05/03/2025     (L) 05/03/2025     Lab Results   Component Value Date    NEUTROABS 2.51 05/02/2025

## 2025-05-04 NOTE — PROGRESS NOTES
"Progress Note - Hospitalist   Name: Alexis Dennison 64 y.o. 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  Lymphoma/leukemia panel nondiagnostic  CT head on 4/3 with interval increase in ventricular caliber concerning for worsening hydrocephalus. Repeat LP on 4/7 was again undiagnostic.   MRI of the brain from 4/11-\"Interval enlargement of the dominant left anterior basal ganglionic mass lesion with greater surrounding vasogenic edema and mass effect. 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  S/p EVD, self removed over 4/26 weekend  Completed keppra 500mg BID x 7 days for postoperative seizure ppx per neurosurgery  Patient started on chemotherapy while inpatient.  Discussed with oncology and plan for rituximab infusion weekly and methotrexate every 2 weeks.  Patient will require daily serum methotrexate levels until less than 0.1.  Also recommend dexamethasone taper as follows:4 mg Q8H through 5/5, then 4 mg BID x4 days, then 2 mg BID x4 days, then " 1 mg BID x4 days, then 1 mg daily x4 days, then discontinue.  Maintain PICC line  Patient medically stable for rehab.  Insurance Auth obtained, awaiting bed  Encephalopathy  Multifactorial in the setting of brain mass, vasogenic edema, medication induced and hospital-acquired delirium  Continue Seroquel   On steroid taper as above  Continue delirium precautions  Patient on 1:1 for safety  Sepsis (HCC)  As evidenced on 4/29 morning with temperature and tachycardia.  Worsening kidney function.  Lactate within normal limits  S/p 1 L bolus, continue IV fluids  Elevated procalcitonin in the setting of recent rituximab  Secondary to UTI, bacteremia  UA with innumerable bacteria and large leukocytes  Urine culture with E. coli  Blood culture with 1 out of 2 with E. coli  On IV antibiotics per ID through 5/5  E coli bacteremia  Blood cultures with 1 out of 2 with E. Coli  CT abdomen pelvis on 4/30 with no acute pathology  On IV antibiotics per ID through 5/5  E. coli UTI  As above  Constipation  Abdomen x-ray on 4/29 with large colonic stool burden without obstruction  CT abdomen pelvis on 4/30 with constipation  S/p enema and suppository  Resolved  Continue bowel regimen  Ambulate patient as able  LETY (acute kidney injury) ([facility])  Lab Results   Component Value Date    CREATININE 1.27 05/04/2025    CREATININE 1.52 (H) 05/03/2025    CREATININE 1.37 (H) 05/02/2025     Baseline creatinine around 0.9, peak creatinine 2.7  Patient was evaluated by nephrology during this hospital stay and given improvement of creatinine with IV fluids signed off on 4/25.  Reconsulted on 5/2 for monitoring post methotrexate  Creatinine has remained stable  Continue to monitor.  Avoid nephrotoxins  Ambulatory dysfunction  Plan for acute rehab  Fall precautions  Ambulate patient  Type 2 diabetes mellitus with hyperglycemia, without long-term current use of insulin ([facility])  Hemoglobin A1c 6.6% on 2/22/2025  PTA metformin 1000 mg daily, holding  On  Lantus 10 units at bedtime   Accu-Cheks reviewed and prandial glucose above goal  Hyperglycemic secondary to steroid use  Start lispro 5 units 3 times daily, insulin sliding scale  Hypoglycemia protocol  Mixed hyperlipidemia  Continue statin  HTN, goal below 130/80  PTA losartan and hydrochlorothiazide, held on admission  BP reviewed and acceptable without medications  Liver lesion  MRI obtained: No suspicious hepatic lesions.  There is a simple right hepatic lobe cyst.  Mild diffuse hepatic steatosis.  LFTs stable  Outpatient follow up advised  Thyroid nodule  Incidental findings on CT scan  Nonemergent thyroid ultrasound for follow-up in the outpatient setting  Pulmonary nodule  Imaging with 4 mm right lower lobe pulmonary nodule.  CT chest 3-month follow-up    VTE Pharmacologic Prophylaxis: VTE Score: 5 High Risk (Score >/= 5) - Pharmacological DVT Prophylaxis Ordered: heparin. Sequential Compression Devices Ordered.    Mobility:   Basic Mobility Inpatient Raw Score: 18  JH-HLM Goal: 6: Walk 10 steps or more  JH-HLM Achieved: 8: Walk 250 feet ot more  JH-HLM Goal achieved. Continue to encourage appropriate mobility.    Patient Centered Rounds: I performed bedside rounds with nursing staff today.   Discussions with Specialists or Other Care Team Provider: None    Education and Discussions with Family / Patient:  Will update daughter.     Current Length of Stay: 36 day(s)  Current Patient Status: Inpatient   Certification Statement: The patient will continue to require additional inpatient hospital stay due to as above  Discharge Plan: Anticipate discharge in 24-48 hrs to rehab facility.    Code Status: Level 1 - Full Code    Subjective   No events overnight.  Patient has no complaints this morning.  Tolerating oral intake with no nausea or vomiting.  Per 1:1 patient had a large bowel movement this morning.  Has no pain.    Objective :  Temp:  [97.4 °F (36.3 °C)-98.4 °F (36.9 °C)] 97.8 °F (36.6 °C)  HR:  [57-67]  57  BP: (126-149)/(71-80) 141/75  Resp:  [16-20] 20  SpO2:  [93 %-98 %] 94 %  O2 Device: None (Room air)    Body mass index is 23.88 kg/m².     Input and Output Summary (last 24 hours):     Intake/Output Summary (Last 24 hours) at 5/4/2025 0952  Last data filed at 5/4/2025 0801  Gross per 24 hour   Intake 2626.66 ml   Output 4750 ml   Net -2123.34 ml       Physical Exam  Vitals and nursing note reviewed.   Constitutional:       General: He is not in acute distress.     Appearance: He is well-developed.   HENT:      Head: Normocephalic and atraumatic.   Eyes:      Conjunctiva/sclera: Conjunctivae normal.   Cardiovascular:      Rate and Rhythm: Normal rate and regular rhythm.   Pulmonary:      Effort: No respiratory distress.   Musculoskeletal:         General: No swelling.      Cervical back: Neck supple.   Skin:     General: Skin is warm and dry.      Capillary Refill: Capillary refill takes less than 2 seconds.   Neurological:      Mental Status: He is alert.         Lines/Drains:  Lines/Drains/Airways       Active Status       Name Placement date Placement time Site Days    PICC Line 05/01/25 Left Brachial 05/01/25  1210  Brachial  2    External Urinary Catheter 05/03/25  0206  -- 1                    Central Line:  Goal for removal: Will discontinue when meds requiring line are completed.               Lab Results: I have reviewed the following results:   Results from last 7 days   Lab Units 05/03/25  1555 05/02/25  0544   WBC Thousand/uL 3.01* 3.51*  3.51*   HEMOGLOBIN g/dL 9.4* 10.1*  10.1*   HEMATOCRIT % 26.1* 28.2*  28.2*   PLATELETS Thousands/uL 146* 110*  110*   SEGS PCT %  --  73   LYMPHO PCT %  --  17   MONO PCT %  --  9   EOS PCT %  --  0     Results from last 7 days   Lab Units 05/04/25  0533 04/30/25  1304 04/29/25  0400   SODIUM mmol/L 141   < > 143   POTASSIUM mmol/L 3.8   < > 4.1   CHLORIDE mmol/L 102   < > 103   CO2 mmol/L 33*   < > 28   BUN mg/dL 24   < > 46*   CREATININE mg/dL 1.27   < >  2.04*   ANION GAP mmol/L 6   < > 12   CALCIUM mg/dL 7.8*   < > 9.6   ALBUMIN g/dL  --   --  3.5   TOTAL BILIRUBIN mg/dL  --   --  1.49*   ALK PHOS U/L  --   --  64   ALT U/L  --   --  62*   AST U/L  --   --  15   GLUCOSE RANDOM mg/dL 190*   < > 143*    < > = values in this interval not displayed.         Results from last 7 days   Lab Units 05/04/25  0731 05/03/25  2007 05/03/25  1559 05/03/25  1108 05/03/25  0730 05/02/25  2101 05/02/25  1624 05/02/25  1035 05/02/25  0703 05/01/25  2205 05/01/25  1558 05/01/25  1048   POC GLUCOSE mg/dl 190* 288* 219* 244* 189* 306* 165* 268* 165* 238* 150* 189*         Results from last 7 days   Lab Units 04/29/25  0406 04/29/25  0357   LACTIC ACID mmol/L  --  1.6   PROCALCITONIN ng/ml 3.93*  --        Recent Cultures (last 7 days):   Results from last 7 days   Lab Units 04/29/25  1448 04/29/25  0341   BLOOD CULTURE   --  No Growth After 5 Days.  Escherichia coli*   GRAM STAIN RESULT   --  Gram negative rods*   URINE CULTURE  >100,000 cfu/ml Escherichia coli*  --        Imaging Results Review: No pertinent imaging studies reviewed.  Other Study Results Review: No additional pertinent studies reviewed.    Last 24 Hours Medication List:     Current Facility-Administered Medications:     acetaminophen (TYLENOL) tablet 650 mg, Q6H PRN    allopurinol (ZYLOPRIM) tablet 300 mg, Daily    alteplase (CATHFLO) injection 2 mg, Q1MIN PRN    aluminum-magnesium hydroxide-simethicone (MAALOX) oral suspension 30 mL, Q4H PRN    atorvastatin (LIPITOR) tablet 20 mg, Daily    bisacodyl (DULCOLAX) rectal suppository 10 mg, Daily    calcium carbonate (TUMS) chewable tablet 1,000 mg, Daily PRN    ceFAZolin (ANCEF) IVPB (premix in dextrose) 2,000 mg 50 mL, Q8H, Last Rate: 2,000 mg (05/04/25 4199)    chlorhexidine (PERIDEX) 0.12 % oral rinse 15 mL, Q12H CAIT    dexamethasone (DECADRON) tablet 4 mg, Q8H CAIT    heparin (porcine) subcutaneous injection 5,000 Units, Q8H CAIT    insulin glargine (LANTUS)  subcutaneous injection 10 Units 0.1 mL, HS    insulin lispro (HumALOG/ADMELOG) 100 units/mL subcutaneous injection 2-12 Units, 4x Daily (AC & HS) **AND** Fingerstick Glucose (POCT), 4x Daily AC and at bedtime    insulin lispro (HumALOG/ADMELOG) 100 units/mL subcutaneous injection 5 Units, TID With Meals    leucovorin 25 mg in dextrose 5 % 50 mL IVPB, Q6H, Last Rate: 25 mg (05/04/25 0415)    melatonin tablet 6 mg, HS    OLANZapine (ZyPREXA) IM injection 5 mg, BID PRN    ondansetron (ZOFRAN) injection 4 mg, Q6H PRN    oxyCODONE (ROXICODONE) split tablet 2.5 mg, Q4H PRN **OR** oxyCODONE (ROXICODONE) IR tablet 5 mg, Q4H PRN    pantoprazole (PROTONIX) EC tablet 40 mg, Early Morning    QUEtiapine (SEROquel) tablet 12.5 mg, Daily    QUEtiapine (SEROquel) tablet 50 mg, HS    senna (SENOKOT) tablet 17.2 mg, HS    sodium bicarbonate 100 mEq in dextrose 5 % 1,000 mL infusion, Continuous, Last Rate: 100 mL/hr (05/04/25 0859)    sodium chloride 0.9 % infusion, Once PRN    Administrative Statements   Today, Patient Was Seen By: Dionne Huang MD      **Please Note: This note may have been constructed using a voice recognition system.**

## 2025-05-04 NOTE — ASSESSMENT & PLAN NOTE
64 yoM with PMHx of DM2, NAFLD, and HT who presented with progressive encephalopathy found to have multiple scattered nodular cerebral and cerebellar enhancement who underwent two non-diagnostic LPs prompting stereotactic brain biopsy (4/14) which showed large B-cell lymphoma.  See oncology progress note on 4/21 for full diagnostic work up.  On 4/29, agitation led to infectious work up with showed UA+ and BCx with 2/2 E. Coli sensitive to ceftriaxone.  ID was consulted is managing and has approved to restart chemo 5/2.    Treatment Plan: regimen modified from (https://pubmed.ncbi.nlm.nih.gov/71629693/)  High-dose methotrexate every 2 weeks x4 cycles followed by every 4 weeks maintenance (could consider dosing every 4 weeks if he discharges to outside rehab)  C1 (4/17) 8 g/m²  C2 (5/2) 3 g/m², dose-reduced due to LETY, delayed x1 day due to E. coli bacteremia   UA Q6H to assess alkalinization of urine (goal urine pH>7.0)  HCO3 100 mEq at 150 mL/hr  Methotrexate levels every 24 hours beginning 24 hours from the start of his infusion.  MTX levels drawn on 5/3/2025 at 3:55 PM.  Results remain pending.  Rituximab (375 mg/m²) weekly x8 weeks  C1D1 (4/18)  C1D8 (4/24)  C2D1 (5/3), ok to treat  C2D8 (5/10), planned  Re-consulted Nephrology given LETY with first cycle, appreciate recs  Final path is pending from biopsy (4/14)

## 2025-05-04 NOTE — PLAN OF CARE
Problem: PAIN - ADULT  Goal: Verbalizes/displays adequate comfort level or baseline comfort level  Description: Interventions:- Encourage patient to monitor pain and request assistance- Assess pain using appropriate pain scale- Administer analgesics based on type and severity of pain and evaluate response- Implement non-pharmacological measures as appropriate and evaluate response- Notify physician/advanced practitioner if interventions unsuccessful or patient reports new pain  Outcome: Progressing     Problem: INFECTION - ADULT  Goal: Absence or prevention of progression during hospitalization  Description: INTERVENTIONS:- Assess and monitor for signs and symptoms of infection- Monitor lab/diagnostic results- Monitor all insertion sites, i.e. indwelling lines, tubes, and drains- [place] appropriate cooling/warming therapies per order- Administer medications as ordered- Instruct and encourage patient and family to use good hand hygiene technique- Identify and instruct in appropriate isolation precautions for identified infection/condition  Outcome: Progressing     Problem: SAFETY ADULT  Goal: Patient will remain free of falls  Description: INTERVENTIONS:- Educate patient/family on patient safety including physical limitations- Instruct patient to call for assistance with activity - Consult OT/PT to assist with strengthening/mobility - Keep Call bell within reach- Keep bed low and locked with side rails adjusted as appropriate- Keep care items and personal belongings within reach- Initiate and maintain comfort rounds- Make Fall Risk Sign visible to staff- Offer Toileting every 2 Hours, in advance of need- Initiate/Maintain bed/chair alarm- Obtain necessary fall risk management equipment.- Apply yellow socks and bracelet for high fall risk patients- Consider moving patient to room near nurses station  INTERVENTIONS:- Educate patient/family on patient safety including physical limitations- Instruct patient to call  for assistance with activity - Consult OT/PT to assist with strengthening/mobility - Keep Call bell within reach- Keep bed low and locked with side rails adjusted as appropriate- Keep care items and personal belongings within reach- Initiate and maintain comfort rounds- Make Fall Risk Sign visible to staff- Offer Toileting every 2 Hours, in advance of need- Initiate/Maintain bed alarm- Obtain necessary fall risk management equipment: bracelet, socks, alarms- Apply yellow socks and bracelet for high fall risk patients- Consider moving patient to room near nurses station  Outcome: Progressing  Goal: Maintain or return to baseline ADL function  Description: INTERVENTIONS:-  Assess patient's ability to carry out ADLs; assess patient's baseline for ADL function and identify physical deficits which impact ability to perform ADLs (bathing, care of mouth/teeth, toileting, grooming, dressing, etc.)- Assess/evaluate cause of self-care deficits - Assess range of motion- Assess patient's mobility; develop plan if impaired- Assess patient's need for assistive devices and provide as appropriate- Encourage maximum independence but intervene and supervise when necessary- Involve family in performance of ADLs- Assess for home care needs following discharge - Consider OT consult to assist with ADL evaluation and planning for discharge- Provide patient education as appropriate  Outcome: Progressing     Problem: DISCHARGE PLANNING  Goal: Discharge to home or other facility with appropriate resources  Description: INTERVENTIONS:- Identify barriers to discharge w/patient and caregiver- Arrange for needed discharge resources and transportation as appropriate- Identify discharge learning needs (meds, wound care, etc.)- Refer to Case Management Department for coordinating discharge planning if the patient needs post-hospital services based on physician/advanced practitioner order or complex needs related to functional status, cognitive  ability, or social support system  Outcome: Progressing     Problem: Knowledge Deficit  Goal: Patient/family/caregiver demonstrates understanding of disease process, treatment plan, medications, and discharge instructions  Description: Complete learning assessment and assess knowledge base.Interventions:- Provide teaching at level of understanding- Provide teaching via preferred learning methods  Outcome: Progressing     Problem: NEUROSENSORY - ADULT  Goal: Achieves stable or improved neurological status  Description: INTERVENTIONS- Monitor and report changes in neurological status- Monitor vital signs such as temperature, blood pressure, glucose, and any other labs ordered - Initiate measures to prevent increased intracranial pressure- Monitor for seizure activity and implement precautions if appropriate    INTERVENTIONS- Monitor and report changes in neurological status- Monitor vital signs such as temperature, blood pressure, glucose, and any other labs ordered - Initiate measures to prevent increased intracranial pressure- Monitor for seizure activity and implement precautions if appropriate    Outcome: Progressing  Goal: Remains free of injury related to seizures activity  Description: INTERVENTIONS- Maintain airway, patient safety  and administer oxygen as ordered- Monitor patient for seizure activity, document and report duration and description of seizure to physician/advanced practitioner- If seizure occurs,  ensure patient safety during seizure- Reorient patient post seizure- Seizure pads on all 4 side rails- Instruct patient/family to notify RN of any seizure activity including if an aura is experienced- Instruct patient/family to call for assistance with activity based on nursing assessment- Administer anti-seizure medications if ordered  INTERVENTIONS- Maintain airway, patient safety  and administer oxygen as ordered- Monitor patient for seizure activity, document and report duration and description of  seizure to physician/advanced practitioner- If seizure occurs,  ensure patient safety during seizure- Reorient patient post seizure- Seizure pads on all 4 side rails- Instruct patient/family to notify RN of any seizure activity including if an aura is experienced- Instruct patient/family to call for assistance with activity based on nursing assessment- Administer anti-seizure medications if ordered  Outcome: Progressing  Goal: Achieves maximal functionality and self care  Description: INTERVENTIONS- Monitor swallowing and airway patency with patient fatigue and changes in neurological status- Encourage and assist patient to increase activity and self care. - Encourage visually impaired, hearing impaired and aphasic patients to use assistive/communication devices  INTERVENTIONS- Monitor swallowing and airway patency with patient fatigue and changes in neurological status- Encourage and assist patient to increase activity and self care. - Encourage visually impaired, hearing impaired and aphasic patients to use assistive/communication devices  Outcome: Progressing     Problem: METABOLIC, FLUID AND ELECTROLYTES - ADULT  Goal: Glucose maintained within target range  Description: INTERVENTIONS:- Monitor Blood Glucose as ordered- Assess for signs and symptoms of hyperglycemia and hypoglycemia- Administer ordered medications to maintain glucose within target range- Assess nutritional intake and initiate nutrition service referral as needed  INTERVENTIONS:- Monitor Blood Glucose as ordered- Assess for signs and symptoms of hyperglycemia and hypoglycemia- Administer ordered medications to maintain glucose within target range- Assess nutritional intake and initiate nutrition service referral as needed  Outcome: Progressing  Goal: Electrolytes maintained within normal limits  Description: INTERVENTIONS:- Monitor labs and assess patient for signs and symptoms of electrolyte imbalances- Administer electrolyte replacement as  ordered- Monitor response to electrolyte replacements, including repeat lab results as appropriate- Instruct patient on fluid and nutrition as appropriate  Outcome: Progressing  Goal: Fluid balance maintained  Description: INTERVENTIONS:- Monitor labs - Monitor I/O and WT- Instruct patient on fluid and nutrition as appropriate- Assess for signs & symptoms of volume excess or deficit  Outcome: Progressing     Problem: Prexisting or High Potential for Compromised Skin Integrity  Goal: Skin integrity is maintained or improved  Description: INTERVENTIONS:- Identify patients at risk for skin breakdown- Assess and monitor skin integrity- Assess and monitor nutrition and hydration status- Monitor labs - Assess for incontinence - Turn and reposition patient- Assist with mobility/ambulation- Relieve pressure over bony prominences- Avoid friction and shearing- Provide appropriate hygiene as needed including keeping skin clean and dry- Evaluate need for skin moisturizer/barrier cream- Collaborate with interdisciplinary team - Patient/family teaching- Consider wound care consult   Outcome: Progressing     Problem: SAFETY,RESTRAINT: NV/NON-SELF DESTRUCTIVE BEHAVIOR  Goal: Remains free of harm/injury (restraint for non violent/non self-detsructive behavior)  Description: INTERVENTIONS:- Instruct patient/family regarding restraint use - Assess and monitor physiologic and psychological status - Provide interventions and comfort measures to meet assessed patient needs - Identify and implement measures to help patient regain control- Assess readiness for release of restraint   Outcome: Progressing  Goal: Returns to optimal restraint-free functioning  Description: INTERVENTIONS:- Assess the patient's behavior and symptoms that indicate continued need for restraint- Identify and implement measures to help patient regain control- Assess readiness for release of restraint   Outcome: Progressing     Problem: CARDIOVASCULAR - ADULT  Goal:  Maintains optimal cardiac output and hemodynamic stability  Description: INTERVENTIONS:- Monitor I/O, vital signs and rhythm- Monitor for S/S and trends of decreased cardiac output- Administer and titrate ordered vasoactive medications to optimize hemodynamic stability- Assess quality of pulses, skin color and temperature- Assess for signs of decreased coronary artery perfusion- Instruct patient to report change in severity of symptoms  Outcome: Progressing  Goal: Absence of cardiac dysrhythmias or at baseline rhythm  Description: INTERVENTIONS:- Continuous cardiac monitoring, vital signs, obtain 12 lead EKG if ordered- Administer antiarrhythmic and heart rate control medications as ordered- Monitor electrolytes and administer replacement therapy as ordered  Outcome: Progressing     Problem: GASTROINTESTINAL - ADULT  Goal: Maintains or returns to baseline bowel function  Description: INTERVENTIONS:- Assess bowel function- Encourage oral fluids to ensure adequate hydration- Administer IV fluids if ordered to ensure adequate hydration- Administer ordered medications as needed- Encourage mobilization and activity- Consider nutritional services referral to assist patient with adequate nutrition and appropriate food choices  Outcome: Progressing  Goal: Maintains adequate nutritional intake  Description: INTERVENTIONS:- Monitor percentage of each meal consumed- Identify factors contributing to decreased intake, treat as appropriate- Assist with meals as needed- Monitor I&O, weight, and lab values if indicated- Obtain nutrition services referral as needed  Outcome: Progressing     Problem: GENITOURINARY - ADULT  Goal: Maintains or returns to baseline urinary function  Description: INTERVENTIONS:- Assess urinary function- Encourage oral fluids to ensure adequate hydration if ordered- Administer IV fluids as ordered to ensure adequate hydration- Administer ordered medications as needed- Offer frequent toileting- Follow  urinary retention protocol if ordered  Outcome: Progressing  Goal: Absence of urinary retention  Description: INTERVENTIONS:- Assess patient’s ability to void and empty bladder- Monitor I/O- Bladder scan as needed- Discuss with physician/AP medications to alleviate retention as needed- Discuss catheterization for long term situations as appropriate  Outcome: Progressing     Problem: SKIN/TISSUE INTEGRITY - ADULT  Goal: Skin Integrity remains intact(Skin Breakdown Prevention)  Description: Assess:-Perform [place] assessment every shift-Clean and moisturize skin every 2-Inspect skin when repositioning, toileting, and assisting with ADLS-Assess under medical devices such as restraints every 2-Assess extremities for adequate circulation and sensation Bed Management:-Have minimal linens on bed & keep smooth, unwrinkled-Change linens as needed when moist or perspiring-Avoid sitting or lying in one position for more than 2 hours while in bed-Keep HOB at 30degrees Toileting:-Offer bedside commode-Assess for incontinence every 2-Use incontinent care products after each incontinent episode such as proper cleaning equipment Activity:-Mobilize patient 3 times a day-Encourage activity and walks on unit-Encourage or provide ROM exercises -Turn and reposition patient every 2 Hours-Use appropriate equipment to lift or move patient in bed-Instruct/ Assist with weight shifting every 2 hours  when out of bed in chair-Consider limitation of chair time 2 hour intervalsSkin Care:-Avoid use of baby powder, tape, friction and shearing, hot water or constrictive clothing-Relieve pressure over bony prominences using 2-Do not massage red bony areasNext Steps:-Teach patient strategies to minimize risks such as pressure injuries -  Outcome: Progressing  Goal: Incision(s), wounds(s) or drain site(s) healing without S/S of infection  Description: INTERVENTIONS- Assess and document dressing, incision, wound bed, drain sites and surrounding tissue-  Provide patient and family education- Perform skin care/dressing changes as needed  Outcome: Progressing     Problem: HEMATOLOGIC - ADULT  Goal: Maintains hematologic stability  Description: INTERVENTIONS- Assess for signs and symptoms of bleeding or hemorrhage- Monitor labs- Administer supportive blood products/factors as ordered and appropriate  Outcome: Progressing     Problem: MUSCULOSKELETAL - ADULT  Goal: Maintain or return mobility to safest level of function  Description: INTERVENTIONS:- Assess patient's ability to carry out ADLs; assess patient's baseline for ADL function and identify physical deficits which impact ability to perform ADLs (bathing, care of mouth/teeth, toileting, grooming, dressing, etc.)- Assess/evaluate cause of self-care deficits - Assess range of motion- Assess patient's mobility- Assess patient's need for assistive devices and provide as appropriate- Encourage maximum independence but intervene and supervise when necessary- Involve family in performance of ADLs- Assess for home care needs following discharge - Consider OT consult to assist with ADL evaluation and planning for discharge- Provide patient education as appropriate  Outcome: Progressing     Problem: Nutrition/Hydration-ADULT  Goal: Nutrient/Hydration intake appropriate for improving, restoring or maintaining nutritional needs  Description: Monitor and assess patient's nutrition/hydration status for malnutrition. Collaborate with interdisciplinary team and initiate plan and interventions as ordered.  Monitor patient's weight and dietary intake as ordered or per policy. Utilize nutrition screening tool and intervene as necessary. Determine patient's food preferences and provide high-protein, high-caloric foods as appropriate. INTERVENTIONS:- Monitor oral intake, urinary output, labs, and treatment plans- Assess nutrition and hydration status and recommend course of action- Evaluate amount of meals eaten- Assist patient with  eating if necessary - Allow adequate time for meals- Recommend/ encourage appropriate diets, oral nutritional supplements, and vitamin/mineral supplements- Order, calculate, and assess calorie counts as needed- Recommend, monitor, and adjust tube feedings and TPN/PPN based on assessed needs- Assess need for intravenous fluids- Provide specific nutrition/hydration education as appropriate- Include patient/family/caregiver in decisions related to nutrition  Outcome: Progressing     Problem: COPING  Goal: Pt/Family able to verbalize concerns and demonstrate effective coping strategies  Description: INTERVENTIONS:- Assist patient/family to identify coping skills, available support systems and cultural and spiritual values- Provide emotional support, including active listening and acknowledgement of concerns of patient and caregivers- Reduce environmental stimuli, as able- Provide patient education- Assess for spiritual pain/suffering and initiate spiritual care, including notification of Pastoral Care or lucie based community as needed- Assess effectiveness of coping strategies  Outcome: Progressing  Goal: Will report anxiety at manageable levels  Description: INTERVENTIONS:- Administer medication as ordered- Teach and encourage coping skills- Provide emotional support- Assess patient/family for anxiety and ability to cope  Outcome: Progressing     Problem: Potential for Falls  Goal: Patient will remain free of falls  Description: INTERVENTIONS:- Educate patient/family on patient safety including physical limitations- Instruct patient to call for assistance with activity - Consult OT/PT to assist with strengthening/mobility - Keep Call bell within reach- Keep bed low and locked with side rails adjusted as appropriate- Keep care items and personal belongings within reach- Initiate and maintain comfort rounds- Make Fall Risk Sign visible to staff- Offer Toileting every 2 Hours, in advance of need- Initiate/Maintain  bed/chair alarm- Obtain necessary fall risk management equipment.- Apply yellow socks and bracelet for high fall risk patients- Consider moving patient to room near nurses station  INTERVENTIONS:- Educate patient/family on patient safety including physical limitations- Instruct patient to call for assistance with activity - Consult OT/PT to assist with strengthening/mobility - Keep Call bell within reach- Keep bed low and locked with side rails adjusted as appropriate- Keep care items and personal belongings within reach- Initiate and maintain comfort rounds- Make Fall Risk Sign visible to staff- Offer Toileting every 2 Hours, in advance of need- Initiate/Maintain bed alarm- Obtain necessary fall risk management equipment: bracelet, socks, alarms- Apply yellow socks and bracelet for high fall risk patients- Consider moving patient to room near nurses station  Outcome: Progressing

## 2025-05-04 NOTE — ASSESSMENT & PLAN NOTE
As evidenced on 4/29 morning with temperature and tachycardia.  Worsening kidney function.  Lactate within normal limits  S/p 1 L bolus, continue IV fluids  Elevated procalcitonin in the setting of recent rituximab  Secondary to UTI, bacteremia  UA with innumerable bacteria and large leukocytes  Urine culture with E. coli  Blood culture with 1 out of 2 with E. coli  On IV antibiotics per ID through 5/5   Pressure channel 2 zero failed.

## 2025-05-04 NOTE — ASSESSMENT & PLAN NOTE
#Non-Oliguric severe KDIGO LETY stage 3 with evidence of kidney recovery  Etiology: Multifactorial likely secondary to hemodynamic changes  Baseline creatinine 0.93 mg/dL  Peak creatinine: 2.7 mg/dL  Renal function improved 1.2 today.     UA: Microhematuria, leukocyturia  Renal imaging : No hydronephrosis  Treatment:  No indication of dialysis, continue to closely monitor BMP.  Avoid hypotension, no NSAIDs, no IV contrast   Status post first dose of methotrexate on 4/17/2025 and second dose 5/2/2025.  Continue alkalinization of the urine with bicarbonate drip check urine analysis every 6 hours.  Noted venous pH 7. 46, urine pH 7.5  Monitor methotrexate level as per oncology  Monitor renal function closely while on methotrexate.  Serum uric acid 2.1, serum calcium 7.8, phosphorus 2.5.   Follow methotrexate level which was drawn yesterday.

## 2025-05-04 NOTE — ASSESSMENT & PLAN NOTE
Lab Results   Component Value Date    CREATININE 1.27 05/04/2025    CREATININE 1.52 (H) 05/03/2025    CREATININE 1.37 (H) 05/02/2025     Baseline creatinine around 0.9, peak creatinine 2.7  Patient was evaluated by nephrology during this hospital stay and given improvement of creatinine with IV fluids signed off on 4/25.  Reconsulted on 5/2 for monitoring post methotrexate  Creatinine has remained stable  Continue to monitor.  Avoid nephrotoxins

## 2025-05-05 LAB
ALBUMIN SERPL BCG-MCNC: 3 G/DL (ref 3.5–5)
ALP SERPL-CCNC: 54 U/L (ref 34–104)
ALT SERPL W P-5'-P-CCNC: 149 U/L (ref 7–52)
ANION GAP SERPL CALCULATED.3IONS-SCNC: 8 MMOL/L (ref 4–13)
AST SERPL W P-5'-P-CCNC: 114 U/L (ref 13–39)
BASE EX.OXY STD BLDV CALC-SCNC: 86.8 % (ref 60–80)
BASE EXCESS BLDV CALC-SCNC: 5.1 MMOL/L
BASOPHILS # BLD AUTO: 0 THOUSANDS/ÂΜL (ref 0–0.1)
BASOPHILS NFR BLD AUTO: 0 % (ref 0–1)
BILIRUB SERPL-MCNC: 0.5 MG/DL (ref 0.2–1)
BUN SERPL-MCNC: 28 MG/DL (ref 5–25)
CA-I BLD-SCNC: 1.15 MMOL/L (ref 1.12–1.32)
CALCIUM ALBUM COR SERPL-MCNC: 9 MG/DL (ref 8.3–10.1)
CALCIUM SERPL-MCNC: 8.2 MG/DL (ref 8.4–10.2)
CHLORIDE SERPL-SCNC: 104 MMOL/L (ref 96–108)
CO2 SERPL-SCNC: 33 MMOL/L (ref 21–32)
CREAT SERPL-MCNC: 1.36 MG/DL (ref 0.6–1.3)
EOSINOPHIL # BLD AUTO: 0.01 THOUSAND/ÂΜL (ref 0–0.61)
EOSINOPHIL NFR BLD AUTO: 0 % (ref 0–6)
ERYTHROCYTE [DISTWIDTH] IN BLOOD BY AUTOMATED COUNT: 11.7 % (ref 11.6–15.1)
GFR SERPL CREATININE-BSD FRML MDRD: 54 ML/MIN/1.73SQ M
GLUCOSE SERPL-MCNC: 123 MG/DL (ref 65–140)
GLUCOSE SERPL-MCNC: 155 MG/DL (ref 65–140)
GLUCOSE SERPL-MCNC: 224 MG/DL (ref 65–140)
GLUCOSE SERPL-MCNC: 250 MG/DL (ref 65–140)
GLUCOSE SERPL-MCNC: 308 MG/DL (ref 65–140)
HCO3 BLDV-SCNC: 29.6 MMOL/L (ref 24–30)
HCT VFR BLD AUTO: 28.8 % (ref 36.5–49.3)
HGB BLD-MCNC: 9.9 G/DL (ref 12–17)
IMM GRANULOCYTES # BLD AUTO: 0.02 THOUSAND/UL (ref 0–0.2)
IMM GRANULOCYTES NFR BLD AUTO: 1 % (ref 0–2)
LYMPHOCYTES # BLD AUTO: 0.37 THOUSANDS/ÂΜL (ref 0.6–4.47)
LYMPHOCYTES NFR BLD AUTO: 15 % (ref 14–44)
MCH RBC QN AUTO: 30.8 PG (ref 26.8–34.3)
MCHC RBC AUTO-ENTMCNC: 34.4 G/DL (ref 31.4–37.4)
MCV RBC AUTO: 90 FL (ref 82–98)
MONOCYTES # BLD AUTO: 0.1 THOUSAND/ÂΜL (ref 0.17–1.22)
MONOCYTES NFR BLD AUTO: 4 % (ref 4–12)
NEUTROPHILS # BLD AUTO: 1.95 THOUSANDS/ÂΜL (ref 1.85–7.62)
NEUTS SEG NFR BLD AUTO: 80 % (ref 43–75)
NRBC BLD AUTO-RTO: 0 /100 WBCS
O2 CT BLDV-SCNC: 13 ML/DL
PCO2 BLDV: 43.7 MM HG (ref 42–50)
PH BLDV: 7.45 [PH] (ref 7.3–7.4)
PHOSPHATE SERPL-MCNC: 3 MG/DL (ref 2.3–4.1)
PLATELET # BLD AUTO: 191 THOUSANDS/UL (ref 149–390)
PMV BLD AUTO: 10 FL (ref 8.9–12.7)
PO2 BLDV: 54.2 MM HG (ref 35–45)
POTASSIUM SERPL-SCNC: 4.1 MMOL/L (ref 3.5–5.3)
PROT SERPL-MCNC: 5.1 G/DL (ref 6.4–8.4)
RBC # BLD AUTO: 3.21 MILLION/UL (ref 3.88–5.62)
SODIUM SERPL-SCNC: 145 MMOL/L (ref 135–147)
WBC # BLD AUTO: 2.45 THOUSAND/UL (ref 4.31–10.16)

## 2025-05-05 PROCEDURE — 85025 COMPLETE CBC W/AUTO DIFF WBC: CPT | Performed by: STUDENT IN AN ORGANIZED HEALTH CARE EDUCATION/TRAINING PROGRAM

## 2025-05-05 PROCEDURE — 82948 REAGENT STRIP/BLOOD GLUCOSE: CPT

## 2025-05-05 PROCEDURE — 97112 NEUROMUSCULAR REEDUCATION: CPT

## 2025-05-05 PROCEDURE — 97116 GAIT TRAINING THERAPY: CPT

## 2025-05-05 PROCEDURE — 99233 SBSQ HOSP IP/OBS HIGH 50: CPT | Performed by: INTERNAL MEDICINE

## 2025-05-05 PROCEDURE — 99232 SBSQ HOSP IP/OBS MODERATE 35: CPT | Performed by: INTERNAL MEDICINE

## 2025-05-05 PROCEDURE — 84100 ASSAY OF PHOSPHORUS: CPT | Performed by: INTERNAL MEDICINE

## 2025-05-05 PROCEDURE — 80053 COMPREHEN METABOLIC PANEL: CPT | Performed by: INTERNAL MEDICINE

## 2025-05-05 PROCEDURE — 97530 THERAPEUTIC ACTIVITIES: CPT

## 2025-05-05 PROCEDURE — 80204 DRUG ASSAY METHOTREXATE: CPT | Performed by: STUDENT IN AN ORGANIZED HEALTH CARE EDUCATION/TRAINING PROGRAM

## 2025-05-05 PROCEDURE — 97535 SELF CARE MNGMENT TRAINING: CPT

## 2025-05-05 PROCEDURE — 82330 ASSAY OF CALCIUM: CPT | Performed by: INTERNAL MEDICINE

## 2025-05-05 PROCEDURE — 82805 BLOOD GASES W/O2 SATURATION: CPT | Performed by: INTERNAL MEDICINE

## 2025-05-05 PROCEDURE — 99232 SBSQ HOSP IP/OBS MODERATE 35: CPT | Performed by: STUDENT IN AN ORGANIZED HEALTH CARE EDUCATION/TRAINING PROGRAM

## 2025-05-05 PROCEDURE — G0545 PR INHERENT VISIT TO INPT: HCPCS | Performed by: INTERNAL MEDICINE

## 2025-05-05 PROCEDURE — 99232 SBSQ HOSP IP/OBS MODERATE 35: CPT | Performed by: PHYSICIAN ASSISTANT

## 2025-05-05 RX ORDER — INSULIN LISPRO 100 [IU]/ML
8 INJECTION, SOLUTION INTRAVENOUS; SUBCUTANEOUS
Status: DISCONTINUED | OUTPATIENT
Start: 2025-05-05 | End: 2025-05-07 | Stop reason: HOSPADM

## 2025-05-05 RX ADMIN — PANTOPRAZOLE SODIUM 40 MG: 40 TABLET, DELAYED RELEASE ORAL at 05:34

## 2025-05-05 RX ADMIN — CEFAZOLIN SODIUM 2000 MG: 2 SOLUTION INTRAVENOUS at 02:31

## 2025-05-05 RX ADMIN — DEXAMETHASONE 4 MG: 4 TABLET ORAL at 21:56

## 2025-05-05 RX ADMIN — INSULIN LISPRO 4 UNITS: 100 INJECTION, SOLUTION INTRAVENOUS; SUBCUTANEOUS at 21:56

## 2025-05-05 RX ADMIN — HEPARIN SODIUM 5000 UNITS: 5000 INJECTION INTRAVENOUS; SUBCUTANEOUS at 05:34

## 2025-05-05 RX ADMIN — CEFAZOLIN SODIUM 2000 MG: 2 SOLUTION INTRAVENOUS at 12:15

## 2025-05-05 RX ADMIN — Medication 6 MG: at 19:57

## 2025-05-05 RX ADMIN — QUETIAPINE FUMARATE 12.5 MG: 25 TABLET ORAL at 12:15

## 2025-05-05 RX ADMIN — INSULIN GLARGINE 10 UNITS: 100 INJECTION, SOLUTION SUBCUTANEOUS at 21:56

## 2025-05-05 RX ADMIN — CEFAZOLIN SODIUM 2000 MG: 2 SOLUTION INTRAVENOUS at 19:54

## 2025-05-05 RX ADMIN — INSULIN LISPRO 8 UNITS: 100 INJECTION, SOLUTION INTRAVENOUS; SUBCUTANEOUS at 16:59

## 2025-05-05 RX ADMIN — ATORVASTATIN CALCIUM 20 MG: 20 TABLET, FILM COATED ORAL at 09:11

## 2025-05-05 RX ADMIN — HEPARIN SODIUM 5000 UNITS: 5000 INJECTION INTRAVENOUS; SUBCUTANEOUS at 15:16

## 2025-05-05 RX ADMIN — DEXAMETHASONE 4 MG: 4 TABLET ORAL at 15:16

## 2025-05-05 RX ADMIN — ALLOPURINOL 300 MG: 300 TABLET ORAL at 09:11

## 2025-05-05 RX ADMIN — INSULIN LISPRO 8 UNITS: 100 INJECTION, SOLUTION INTRAVENOUS; SUBCUTANEOUS at 16:58

## 2025-05-05 RX ADMIN — CHLORHEXIDINE GLUCONATE 0.12% ORAL RINSE 15 ML: 1.2 LIQUID ORAL at 09:15

## 2025-05-05 RX ADMIN — DEXAMETHASONE 4 MG: 4 TABLET ORAL at 05:34

## 2025-05-05 RX ADMIN — INSULIN LISPRO 8 UNITS: 100 INJECTION, SOLUTION INTRAVENOUS; SUBCUTANEOUS at 12:16

## 2025-05-05 RX ADMIN — INSULIN LISPRO 5 UNITS: 100 INJECTION, SOLUTION INTRAVENOUS; SUBCUTANEOUS at 09:11

## 2025-05-05 RX ADMIN — CHLORHEXIDINE GLUCONATE 0.12% ORAL RINSE 15 ML: 1.2 LIQUID ORAL at 21:55

## 2025-05-05 RX ADMIN — HEPARIN SODIUM 5000 UNITS: 5000 INJECTION INTRAVENOUS; SUBCUTANEOUS at 21:57

## 2025-05-05 RX ADMIN — QUETIAPINE FUMARATE 50 MG: 25 TABLET ORAL at 21:56

## 2025-05-05 RX ADMIN — INSULIN LISPRO 6 UNITS: 100 INJECTION, SOLUTION INTRAVENOUS; SUBCUTANEOUS at 12:15

## 2025-05-05 NOTE — ASSESSMENT & PLAN NOTE
64 yoM with PMHx of DM2, NAFLD, and HT who presented with progressive encephalopathy found to have multiple scattered nodular cerebral and cerebellar enhancement who underwent two non-diagnostic LPs prompting stereotactic brain biopsy (4/14) which showed large B-cell lymphoma.  See oncology progress note on 4/21 for full diagnostic work up.  On 4/29, agitation led to infectious work up with showed UA+ and BCx with 2/2 E. Coli sensitive to ceftriaxone.  ID was consulted is managing and has approved to restart chemo 5/2.    Treatment Plan: regimen modified from (https://pubmed.ncbi.nlm.nih.gov/43093006/)  High-dose methotrexate every 2 weeks x4 cycles followed by every 4 weeks maintenance (could consider dosing every 4 weeks if he discharges to outside rehab)  C1 (4/17) 8 g/m²  C2 (5/2) 3 g/m², dose-reduced due to LETY, delayed x1 day due to E. coli bacteremia   Urine pH remained above 7.5.,  Was initially started on bicarb that is discontinued now.  Methotrexate level 24 hours post high-dose methotrexate, from 5/3/2025 was less than 0.10.  Will repeat methotrexate level today.    Rituximab (375 mg/m²) weekly x8 weeks  C1D1 (4/18)  C1D8 (4/24)  C2D1 (5/3), ok to treat  C2D8 (5/10), planned  Nephrology on board.   Final path (ancillary testing) still pending from biopsy (4/14)

## 2025-05-05 NOTE — ASSESSMENT & PLAN NOTE
The patient has baseline normal renal function creatinine 0.9.  Developed acute kidney injury.  Workup showed no hydronephrosis.  Creatinine today is 1.3 which has improved over the last week.  The patient had received doses of methotrexate urine was alkalinize to help avoid toxicity.  For now creatinine slightly above baseline but stable so we will monitor with supportive care.    Patient is receiving chemotherapy for CNS lymphoma.  Per ID note he was treated for E. coli bacteremia felt due to urinary source.

## 2025-05-05 NOTE — PLAN OF CARE
Problem: PAIN - ADULT  Goal: Verbalizes/displays adequate comfort level or baseline comfort level  Description: Interventions:- Encourage patient to monitor pain and request assistance- Assess pain using appropriate pain scale- Administer analgesics based on type and severity of pain and evaluate response- Implement non-pharmacological measures as appropriate and evaluate response- Notify physician/advanced practitioner if interventions unsuccessful or patient reports new pain  Outcome: Progressing     Problem: INFECTION - ADULT  Goal: Absence or prevention of progression during hospitalization  Description: INTERVENTIONS:- Assess and monitor for signs and symptoms of infection- Monitor lab/diagnostic results- Monitor all insertion sites, i.e. indwelling lines, tubes, and drains- Morrison appropriate cooling/warming therapies per order- Administer medications as ordered- Instruct and encourage patient and family to use good hand hygiene technique- Identify and instruct in appropriate isolation precautions for identified infection/condition  Outcome: Progressing     Problem: SAFETY ADULT  Goal: Patient will remain free of falls  Description: INTERVENTIONS:- Educate patient/family on patient safety including physical limitations- Instruct patient to call for assistance with activity - Consult OT/PT to assist with strengthening/mobility - Keep Call bell within reach- Keep bed low and locked with side rails adjusted as appropriate- Keep care items and personal belongings within reach- Initiate and maintain comfort rounds- Make Fall Risk Sign visible to staff- Offer Toileting every 2 Hours, in advance of need- Initiate/Maintain bed/chair alarm- Obtain necessary fall risk management equipment.- Apply yellow socks and bracelet for high fall risk patients- Consider moving patient to room near nurses station  INTERVENTIONS:- Educate patient/family on patient safety including physical limitations- Instruct patient to call  for assistance with activity - Consult OT/PT to assist with strengthening/mobility - Keep Call bell within reach- Keep bed low and locked with side rails adjusted as appropriate- Keep care items and personal belongings within reach- Initiate and maintain comfort rounds- Make Fall Risk Sign visible to staff- Offer Toileting every 2 Hours, in advance of need- Initiate/Maintain bed alarm- Obtain necessary fall risk management equipment: bracelet, socks, alarms- Apply yellow socks and bracelet for high fall risk patients- Consider moving patient to room near nurses station  Outcome: Progressing  Goal: Maintain or return to baseline ADL function  Description: INTERVENTIONS:-  Assess patient's ability to carry out ADLs; assess patient's baseline for ADL function and identify physical deficits which impact ability to perform ADLs (bathing, care of mouth/teeth, toileting, grooming, dressing, etc.)- Assess/evaluate cause of self-care deficits - Assess range of motion- Assess patient's mobility; develop plan if impaired- Assess patient's need for assistive devices and provide as appropriate- Encourage maximum independence but intervene and supervise when necessary- Involve family in performance of ADLs- Assess for home care needs following discharge - Consider OT consult to assist with ADL evaluation and planning for discharge- Provide patient education as appropriate  Outcome: Progressing     Problem: DISCHARGE PLANNING  Goal: Discharge to home or other facility with appropriate resources  Description: INTERVENTIONS:- Identify barriers to discharge w/patient and caregiver- Arrange for needed discharge resources and transportation as appropriate- Identify discharge learning needs (meds, wound care, etc.)- Refer to Case Management Department for coordinating discharge planning if the patient needs post-hospital services based on physician/advanced practitioner order or complex needs related to functional status, cognitive  ability, or social support system  Outcome: Progressing     Problem: Knowledge Deficit  Goal: Patient/family/caregiver demonstrates understanding of disease process, treatment plan, medications, and discharge instructions  Description: Complete learning assessment and assess knowledge base.Interventions:- Provide teaching at level of understanding- Provide teaching via preferred learning methods  Outcome: Progressing     Problem: NEUROSENSORY - ADULT  Goal: Achieves stable or improved neurological status  Description: INTERVENTIONS- Monitor and report changes in neurological status- Monitor vital signs such as temperature, blood pressure, glucose, and any other labs ordered - Initiate measures to prevent increased intracranial pressure- Monitor for seizure activity and implement precautions if appropriate    INTERVENTIONS- Monitor and report changes in neurological status- Monitor vital signs such as temperature, blood pressure, glucose, and any other labs ordered - Initiate measures to prevent increased intracranial pressure- Monitor for seizure activity and implement precautions if appropriate    Outcome: Progressing  Goal: Remains free of injury related to seizures activity  Description: INTERVENTIONS- Maintain airway, patient safety  and administer oxygen as ordered- Monitor patient for seizure activity, document and report duration and description of seizure to physician/advanced practitioner- If seizure occurs,  ensure patient safety during seizure- Reorient patient post seizure- Seizure pads on all 4 side rails- Instruct patient/family to notify RN of any seizure activity including if an aura is experienced- Instruct patient/family to call for assistance with activity based on nursing assessment- Administer anti-seizure medications if ordered  INTERVENTIONS- Maintain airway, patient safety  and administer oxygen as ordered- Monitor patient for seizure activity, document and report duration and description of  seizure to physician/advanced practitioner- If seizure occurs,  ensure patient safety during seizure- Reorient patient post seizure- Seizure pads on all 4 side rails- Instruct patient/family to notify RN of any seizure activity including if an aura is experienced- Instruct patient/family to call for assistance with activity based on nursing assessment- Administer anti-seizure medications if ordered  Outcome: Progressing  Goal: Achieves maximal functionality and self care  Description: INTERVENTIONS- Monitor swallowing and airway patency with patient fatigue and changes in neurological status- Encourage and assist patient to increase activity and self care. - Encourage visually impaired, hearing impaired and aphasic patients to use assistive/communication devices  INTERVENTIONS- Monitor swallowing and airway patency with patient fatigue and changes in neurological status- Encourage and assist patient to increase activity and self care. - Encourage visually impaired, hearing impaired and aphasic patients to use assistive/communication devices  Outcome: Progressing     Problem: METABOLIC, FLUID AND ELECTROLYTES - ADULT  Goal: Glucose maintained within target range  Description: INTERVENTIONS:- Monitor Blood Glucose as ordered- Assess for signs and symptoms of hyperglycemia and hypoglycemia- Administer ordered medications to maintain glucose within target range- Assess nutritional intake and initiate nutrition service referral as needed  INTERVENTIONS:- Monitor Blood Glucose as ordered- Assess for signs and symptoms of hyperglycemia and hypoglycemia- Administer ordered medications to maintain glucose within target range- Assess nutritional intake and initiate nutrition service referral as needed  Outcome: Progressing  Goal: Electrolytes maintained within normal limits  Description: INTERVENTIONS:- Monitor labs and assess patient for signs and symptoms of electrolyte imbalances- Administer electrolyte replacement as  ordered- Monitor response to electrolyte replacements, including repeat lab results as appropriate- Instruct patient on fluid and nutrition as appropriate  Outcome: Progressing  Goal: Fluid balance maintained  Description: INTERVENTIONS:- Monitor labs - Monitor I/O and WT- Instruct patient on fluid and nutrition as appropriate- Assess for signs & symptoms of volume excess or deficit  Outcome: Progressing     Problem: Prexisting or High Potential for Compromised Skin Integrity  Goal: Skin integrity is maintained or improved  Description: INTERVENTIONS:- Identify patients at risk for skin breakdown- Assess and monitor skin integrity- Assess and monitor nutrition and hydration status- Monitor labs - Assess for incontinence - Turn and reposition patient- Assist with mobility/ambulation- Relieve pressure over bony prominences- Avoid friction and shearing- Provide appropriate hygiene as needed including keeping skin clean and dry- Evaluate need for skin moisturizer/barrier cream- Collaborate with interdisciplinary team - Patient/family teaching- Consider wound care consult   Outcome: Progressing     Problem: SAFETY,RESTRAINT: NV/NON-SELF DESTRUCTIVE BEHAVIOR  Goal: Remains free of harm/injury (restraint for non violent/non self-detsructive behavior)  Description: INTERVENTIONS:- Instruct patient/family regarding restraint use - Assess and monitor physiologic and psychological status - Provide interventions and comfort measures to meet assessed patient needs - Identify and implement measures to help patient regain control- Assess readiness for release of restraint   Outcome: Progressing  Goal: Returns to optimal restraint-free functioning  Description: INTERVENTIONS:- Assess the patient's behavior and symptoms that indicate continued need for restraint- Identify and implement measures to help patient regain control- Assess readiness for release of restraint   Outcome: Progressing     Problem: CARDIOVASCULAR - ADULT  Goal:  Maintains optimal cardiac output and hemodynamic stability  Description: INTERVENTIONS:- Monitor I/O, vital signs and rhythm- Monitor for S/S and trends of decreased cardiac output- Administer and titrate ordered vasoactive medications to optimize hemodynamic stability- Assess quality of pulses, skin color and temperature- Assess for signs of decreased coronary artery perfusion- Instruct patient to report change in severity of symptoms  Outcome: Progressing  Goal: Absence of cardiac dysrhythmias or at baseline rhythm  Description: INTERVENTIONS:- Continuous cardiac monitoring, vital signs, obtain 12 lead EKG if ordered- Administer antiarrhythmic and heart rate control medications as ordered- Monitor electrolytes and administer replacement therapy as ordered  Outcome: Progressing     Problem: GASTROINTESTINAL - ADULT  Goal: Maintains or returns to baseline bowel function  Description: INTERVENTIONS:- Assess bowel function- Encourage oral fluids to ensure adequate hydration- Administer IV fluids if ordered to ensure adequate hydration- Administer ordered medications as needed- Encourage mobilization and activity- Consider nutritional services referral to assist patient with adequate nutrition and appropriate food choices  Outcome: Progressing  Goal: Maintains adequate nutritional intake  Description: INTERVENTIONS:- Monitor percentage of each meal consumed- Identify factors contributing to decreased intake, treat as appropriate- Assist with meals as needed- Monitor I&O, weight, and lab values if indicated- Obtain nutrition services referral as needed  Outcome: Progressing     Problem: GENITOURINARY - ADULT  Goal: Maintains or returns to baseline urinary function  Description: INTERVENTIONS:- Assess urinary function- Encourage oral fluids to ensure adequate hydration if ordered- Administer IV fluids as ordered to ensure adequate hydration- Administer ordered medications as needed- Offer frequent toileting- Follow  urinary retention protocol if ordered  Outcome: Progressing  Goal: Absence of urinary retention  Description: INTERVENTIONS:- Assess patient’s ability to void and empty bladder- Monitor I/O- Bladder scan as needed- Discuss with physician/AP medications to alleviate retention as needed- Discuss catheterization for long term situations as appropriate  Outcome: Progressing     Problem: SKIN/TISSUE INTEGRITY - ADULT  Goal: Skin Integrity remains intact(Skin Breakdown Prevention)  Description: Assess:-Perform [place] assessment every shift-Clean and moisturize skin every 2-Inspect skin when repositioning, toileting, and assisting with ADLS-Assess under medical devices such as restraints every 2-Assess extremities for adequate circulation and sensation Bed Management:-Have minimal linens on bed & keep smooth, unwrinkled-Change linens as needed when moist or perspiring-Avoid sitting or lying in one position for more than 2 hours while in bed-Keep HOB at 30degrees Toileting:-Offer bedside commode-Assess for incontinence every 2-Use incontinent care products after each incontinent episode such as proper cleaning equipment Activity:-Mobilize patient 3 times a day-Encourage activity and walks on unit-Encourage or provide ROM exercises -Turn and reposition patient every 2 Hours-Use appropriate equipment to lift or move patient in bed-Instruct/ Assist with weight shifting every 2 hours  when out of bed in chair-Consider limitation of chair time 2 hour intervalsSkin Care:-Avoid use of baby powder, tape, friction and shearing, hot water or constrictive clothing-Relieve pressure over bony prominences using 2-Do not massage red bony areasNext Steps:-Teach patient strategies to minimize risks such as pressure injuries -  Outcome: Progressing  Goal: Incision(s), wounds(s) or drain site(s) healing without S/S of infection  Description: INTERVENTIONS- Assess and document dressing, incision, wound bed, drain sites and surrounding tissue-  Provide patient and family education- Perform skin care/dressing changes as needed  Outcome: Progressing     Problem: HEMATOLOGIC - ADULT  Goal: Maintains hematologic stability  Description: INTERVENTIONS- Assess for signs and symptoms of bleeding or hemorrhage- Monitor labs- Administer supportive blood products/factors as ordered and appropriate  Outcome: Progressing     Problem: MUSCULOSKELETAL - ADULT  Goal: Maintain or return mobility to safest level of function  Description: INTERVENTIONS:- Assess patient's ability to carry out ADLs; assess patient's baseline for ADL function and identify physical deficits which impact ability to perform ADLs (bathing, care of mouth/teeth, toileting, grooming, dressing, etc.)- Assess/evaluate cause of self-care deficits - Assess range of motion- Assess patient's mobility- Assess patient's need for assistive devices and provide as appropriate- Encourage maximum independence but intervene and supervise when necessary- Involve family in performance of ADLs- Assess for home care needs following discharge - Consider OT consult to assist with ADL evaluation and planning for discharge- Provide patient education as appropriate  Outcome: Progressing     Problem: Nutrition/Hydration-ADULT  Goal: Nutrient/Hydration intake appropriate for improving, restoring or maintaining nutritional needs  Description: Monitor and assess patient's nutrition/hydration status for malnutrition. Collaborate with interdisciplinary team and initiate plan and interventions as ordered.  Monitor patient's weight and dietary intake as ordered or per policy. Utilize nutrition screening tool and intervene as necessary. Determine patient's food preferences and provide high-protein, high-caloric foods as appropriate. INTERVENTIONS:- Monitor oral intake, urinary output, labs, and treatment plans- Assess nutrition and hydration status and recommend course of action- Evaluate amount of meals eaten- Assist patient with  eating if necessary - Allow adequate time for meals- Recommend/ encourage appropriate diets, oral nutritional supplements, and vitamin/mineral supplements- Order, calculate, and assess calorie counts as needed- Recommend, monitor, and adjust tube feedings and TPN/PPN based on assessed needs- Assess need for intravenous fluids- Provide specific nutrition/hydration education as appropriate- Include patient/family/caregiver in decisions related to nutrition  Outcome: Progressing     Problem: COPING  Goal: Pt/Family able to verbalize concerns and demonstrate effective coping strategies  Description: INTERVENTIONS:- Assist patient/family to identify coping skills, available support systems and cultural and spiritual values- Provide emotional support, including active listening and acknowledgement of concerns of patient and caregivers- Reduce environmental stimuli, as able- Provide patient education- Assess for spiritual pain/suffering and initiate spiritual care, including notification of Pastoral Care or lucie based community as needed- Assess effectiveness of coping strategies  Outcome: Progressing  Goal: Will report anxiety at manageable levels  Description: INTERVENTIONS:- Administer medication as ordered- Teach and encourage coping skills- Provide emotional support- Assess patient/family for anxiety and ability to cope  Outcome: Progressing     Problem: Potential for Falls  Goal: Patient will remain free of falls  Description: INTERVENTIONS:- Educate patient/family on patient safety including physical limitations- Instruct patient to call for assistance with activity - Consult OT/PT to assist with strengthening/mobility - Keep Call bell within reach- Keep bed low and locked with side rails adjusted as appropriate- Keep care items and personal belongings within reach- Initiate and maintain comfort rounds- Make Fall Risk Sign visible to staff- Offer Toileting every 2 Hours, in advance of need- Initiate/Maintain  bed/chair alarm- Obtain necessary fall risk management equipment.- Apply yellow socks and bracelet for high fall risk patients- Consider moving patient to room near nurses station  INTERVENTIONS:- Educate patient/family on patient safety including physical limitations- Instruct patient to call for assistance with activity - Consult OT/PT to assist with strengthening/mobility - Keep Call bell within reach- Keep bed low and locked with side rails adjusted as appropriate- Keep care items and personal belongings within reach- Initiate and maintain comfort rounds- Make Fall Risk Sign visible to staff- Offer Toileting every 2 Hours, in advance of need- Initiate/Maintain bed alarm- Obtain necessary fall risk management equipment: bracelet, socks, alarms- Apply yellow socks and bracelet for high fall risk patients- Consider moving patient to room near nurses station  Outcome: Progressing

## 2025-05-05 NOTE — ASSESSMENT & PLAN NOTE
improved  In the setting of the above  EVD dislodged earlier in stay due to  agitation while admitted to ICU but did not need to be replaced secondary to stable ventricle size  Off restraints, on 1:1.  Improves with increased activity, hopeful for discharge to acute rehab

## 2025-05-05 NOTE — ASSESSMENT & PLAN NOTE
"Palliative diagnosis: newly diagnosed large B cell lymphoma    Symptom management:  Seroquel 12.5mg at lunch 50mg HS (agitation improved on this regimen)  Olanzapine remains available BID PRN but has not received in several days  Goal to wean patient from 1:1 to enable therapy/discharge planning  Decadron re-started at 4mg TID due to increased confusion and edema, plan to wean dose every 4 days   Switched to PO per [facility] requirement  oxyIR 2.5-5mg PO Q4H PRN moderate-severe pain  Acetaminophen 650mg Q6H PRN mild pain  Bowel regimen in place    Goals:  Level 1 code status  Disease focused care without limits placed  Patient initiated high-dose methotrexate + rituximab inpatient  Current focus on rehabilitation, hopeful for discharge to [facility] pending bed availability    Decisional apparatus:  Patient does not have capacity on exam today.  If capacity is lost, patient's substitute decision maker would default to spouse and adult children by [place] Act 169.  ER contacts:  Elizabeth Chester  Daughter  [phone]  Guy Hitchcock \"Naldo\" Phillip  Spouse  [phone]    Patient also has a son, Drew, and another daughter, Marina, who has down syndrome. Elizabeth is the main contact but family makes decisions as unit.    Advance Directive/Living Will/POLST: none on file    Social support:  Patient support system: spouse Naldo, 3 adult children, extended family.  Spouse Naldo lives locally and daughter Elizabeth is from [place] but Naldo prefers that she is primary contact due to language barrier (Chinese)  4/22: Met with Naldo along with palliative SW (Shikha and Brynn) and  (Kaylee #[id number]).  Provided medical update, supportive listening, answered questions to her satisfaction  Normalized experience of patient/family  Provided anticipatory guidance  Advocated for patient/family with interdisciplinary care team  Alexis is a , consider posey mat or fidget toys    Coordination of care:  Reviewed case with RN, primary team    Follow " up  Palliative Care will sign off as symptoms are well controlled and goals are well defined. Recommend outpatient follow-up for ongoing support.  Please reach out via [employer] secure chat if questions or concerns arise.    We appreciate the invitation to be involved in this patient's care.

## 2025-05-05 NOTE — ASSESSMENT & PLAN NOTE
Diagnosed by LP, brain biopsy.  Complicated by obstructive hydrocephalus, status post EVD 4/13 through 4/19.  Status post Rituxan, high-dose methotrexate, and intrathecal cytarabine on 4/17.  Ongoing chemo.   Janet Whalen PA-C

## 2025-05-05 NOTE — UTILIZATION REVIEW
Continued Stay Review    Date: 5/5/25                           Current Patient Class: Inpatient  Current Level of Care: Med Surg     HPI:64 y.o. male initially admitted on  3/29/25    Current Diagnosis:Primary CNS lymphoma    Assessment/Plan: Hospitalist; palliative/Infectious diease/Nephrology: : Patient has no complaints this morning. Agitation improved on seroquel. No doses of PRN olanzapine since last week. Sleep and impulsivity improved. Patient reports feeling well, has been ambulating regularly and with improvement. Appetite is good. Tolerating oral intake with no nausea or vomiting. Denies pain .UOP 3120 ml, + disoriented. On 1:1 obs for safety. Hgb 9.9, hct 28.8. Bun 28, creat 1.36, albumin 3.0, , , Glucose 155. Creat improved over last week, slightly above baseline , cont to monitor. Urine culture with E. Coli. Blood culture with 1 out of 2 with E. Coli. On IV abx through  5/5. BS ranges  prandial glucose above goal. Hyperglycemic secondary to steroid use, increase Lispro to 8 U TID, ISS. Patient started on chemotherapy while inpatient.  Discussed with oncology and plan for rituximab infusion weekly and methotrexate every 2 weeks.  Patient will require daily serum methotrexate levels until less than 0.1.  Also recommend dexamethasone taper as follows:4 mg Q8H through 5/5, then 4 mg BID x4 days, then 2 mg BID x4 days, then 1 mg BID x4 days, then 1 mg daily x4 days, then discontinue.  Maintain PICC line. Patient medically stable for rehab.  Insurance Auth obtained for Acute rehab, awaiting bed.         Medications:   Scheduled Medications:  allopurinol, 300 mg, Oral, Daily  atorvastatin, 20 mg, Oral, Daily  bisacodyl, 10 mg, Rectal, Daily  cefazolin, 2,000 mg, Intravenous, Q8H  chlorhexidine, 15 mL, Mouth/Throat, Q12H CAIT  dexamethasone, 4 mg, Oral, Q8H CAIT  heparin (porcine), 5,000 Units, Subcutaneous, Q8H CAIT  insulin glargine, 10 Units, Subcutaneous, HS  insulin lispro, 2-12 Units,  Subcutaneous, 4x Daily (AC & HS)  insulin lispro, 8 Units, Subcutaneous, TID With Meals  melatonin, 6 mg, Oral, HS  pantoprazole, 40 mg, Oral, Early Morning  QUEtiapine, 12.5 mg, Oral, Daily  QUEtiapine, 50 mg, Oral, HS  senna, 2 tablet, Oral, HS      Continuous IV Infusions: none      PRN Meds:  acetaminophen, 650 mg, Oral, Q6H PRN  alteplase, 2 mg, Intracatheter, Q1MIN PRN  aluminum-magnesium hydroxide-simethicone, 30 mL, Oral, Q4H PRN  calcium carbonate, 1,000 mg, Oral, Daily PRN  OLANZapine, 5 mg, Intramuscular, BID PRN  ondansetron, 4 mg, Intravenous, Q6H PRN  oxyCODONE, 2.5 mg, Oral, Q4H PRN   Or  oxyCODONE, 5 mg, Oral, Q4H PRN  sodium chloride, 20 mL/hr, Intravenous, Once PRN      Discharge Plan: TBD     Vital Signs (last 3 days)       Date/Time Temp Pulse Resp BP MAP (mmHg) SpO2 O2 Device Patient Position - Orthostatic VS [place] Coma Scale Score Pain    05/05/25 11:25:02 98.2 °F (36.8 °C) 74 17 124/83 97 98 % -- -- -- --    05/05/25 11:07:32 97.8 °F (36.6 °C) 74 16 123/86 98 99 % -- -- -- --    05/05/25 0900 -- -- -- -- -- -- None (Room air) -- 14 No Pain    05/05/25 07:04:24 97.9 °F (36.6 °C) 60 15 143/76 98 97 % -- -- -- --    05/05/25 0530 -- -- -- -- -- -- -- -- 14 --    05/05/25 02:47:58 97.6 °F (36.4 °C) 60 17 144/82 103 98 % None (Room air) Lying -- No Pain    05/05/25 0245 -- -- -- -- -- -- -- -- 14 No Pain    05/05/25 0100 -- -- -- -- -- -- -- -- 14 --    05/04/25 22:43:01 98.3 °F (36.8 °C) 62 17 143/72 96 96 % None (Room air) Lying -- No Pain    05/04/25 2000 -- -- -- -- -- -- -- -- 14 No Pain    05/04/25 1900 98.3 °F (36.8 °C) -- -- -- -- -- -- -- -- --    05/04/25 18:45:52 98.3 °F (36.8 °C) 69 17 145/75 98 96 % None (Room air) Lying -- No Pain    05/04/25 15:00:49 97.8 °F (36.6 °C) 62 17 147/77 100 100 % -- -- -- --    05/04/25 11:16:27 98.1 °F (36.7 °C) 63 -- 135/74 94 98 % -- -- -- --    05/04/25 0800 -- -- -- -- -- -- None (Room air) -- -- No Pain    05/04/25 07:24:14 97.8 °F (36.6 °C) 57 20  141/75 97 94 % -- -- -- --    05/04/25 0544 -- -- -- -- -- -- -- -- 14 --    05/04/25 0300 -- -- -- -- -- -- -- -- 14 No Pain    05/04/25 02:49:30 -- 57 20 142/75 97 96 % None (Room air) Lying -- No Pain    05/04/25 02:48:26 -- 60 20 142/75 97 95 % -- -- -- --    05/03/25 22:55:21 97.4 °F (36.3 °C) 61 16 144/75 98 94 % None (Room air) Lying -- No Pain    05/03/25 2030 -- -- -- -- -- -- -- -- 14 No Pain    05/03/25 19:23:07 97.8 °F (36.6 °C) 63 16 127/74 92 98 % None (Room air) Sitting -- No Pain    05/03/25 17:11:03 98 °F (36.7 °C) 67 17 126/75 92 95 % -- Sitting -- --    05/03/25 1601 -- -- -- -- -- -- -- -- 14 No Pain    05/03/25 14:46:45 98.4 °F (36.9 °C) 60 16 127/71 90 97 % -- Lying -- --    05/03/25 1430 -- -- -- -- -- -- -- -- 14 No Pain    05/03/25 14:00:58 -- 67 -- 128/71 90 96 % -- -- -- --    05/03/25 1200 -- -- -- -- -- -- -- -- 14 No Pain    05/03/25 11:08:19 98.2 °F (36.8 °C) 59 18 149/80 103 93 % -- -- -- --    05/03/25 0810 -- -- -- -- -- -- None (Room air) -- 14 --    05/03/25 0800 -- 59 -- -- -- 97 % None (Room air) -- 14 No Pain    05/03/25 07:26:44 98.4 °F (36.9 °C) 55 17 149/82 104 96 % -- -- -- --    05/03/25 0300 -- -- -- -- -- -- -- -- 14 No Pain    05/03/25 02:32:51 -- 55 17 134/69 91 94 % None (Room air) Lying -- No Pain    05/02/25 2200 -- -- -- -- -- -- -- -- 14 No Pain    05/02/25 21:59:58 98.3 °F (36.8 °C) 57 17 131/69 90 97 % None (Room air) Lying -- No Pain    05/02/25 19:16:07 97.9 °F (36.6 °C) 63 18 136/73 94 96 % None (Room air) Lying -- No Pain    05/02/25 18:01:19 -- 63 -- 122/63 83 97 % -- -- -- --    05/02/25 16:52:47 -- 72 -- 124/53 77 95 % -- -- -- --    05/02/25 1600 -- -- -- -- -- -- None (Room air) -- 14 No Pain    05/02/25 15:50:09 98.3 °F (36.8 °C) 59 -- 133/68 90 95 % -- Lying -- --    05/02/25 14:57:37 98 °F (36.7 °C) 63 18 133/67 89 96 % -- -- -- --    05/02/25 1302 -- -- -- -- -- -- -- -- -- No Pain    05/02/25 1200 -- -- -- -- -- -- -- -- 14 --    05/02/25 10:42:13  97.9 °F (36.6 °C) 61 -- 136/66 89 96 % -- -- -- --    05/02/25 0800 -- -- -- -- -- -- None (Room air) -- 14 No Pain    05/02/25 07:01:18 97.5 °F (36.4 °C) -- -- 137/80 99 -- -- -- -- --    05/02/25 0000 -- -- -- -- -- -- -- -- 14 --          Weight (last 2 days)       None            Pertinent Labs/Diagnostic Results:   Radiology:  CT abdomen pelvis wo contrast   Final Interpretation by Isabel Barnes MD (04/30 1615)      No acute inflammatory process in the abdomen or pelvis.      Large amount of stool throughout the colon.      Workstation performed: [id number]         XR abdomen obstruction series   Final Interpretation by Lonnie Oliva MD (04/30 0906)      1.  Large colonic stool burden without obstruction or other acute abdominal findings.      2.  No acute cardiopulmonary findings.         Resident: CINTHYA FAJARDO I, the attending radiologist, have reviewed the images and agree with the final report above.      Workstation performed: [id number]         RADIOLOGY RESULTS   Final Interpretation by  (04/23 1108)      CT head wo contrast   Final Interpretation by Satnam Riojas MD (04/21 0939)      1. Increasing ventricular caliber without evidence of an acute, decompensated hydrocephalus. Recommend continued close follow-up.   2. Improving intraventricular blood products.      The study was marked in EPIC for immediate notification.                  Workstation performed: [id number]         CT head wo contrast   Final Interpretation by Michael Campbell MD (04/20 0748)      1.  Interval removal of left frontal approach ventriculostomy catheter, noting stable size and configuration of the ventricular system, which contains intraventricular blood products. Blood products also noted along the site of the prior catheter tract.   2.  Refer to concurrent brain MRI for characterization of hypoattenuation corresponding to T2/FLAIR signal abnormality involving the left gangliocapsular region  extending into the left frontal lobe inferiorly.      The study was marked in EPIC for immediate notification.            Workstation performed: [id number]         MRI Brain BT w wo Contrast   Final Interpretation by Michael Campbell MD (04/20 0937)   Within the confines of a motion-degraded examination:      1.  Decreased enhancement and perfusion metrics associated with the left gangliocapsular mass (biopsy-proven lymphoma), noting decreased mass effect on the lateral ventricles and decreased transependymal flow of CSF. The change from MRI dated 4/11/2025    could be related to medical therapy. See narrative above for full discussion.   2.  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   3.  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 This could be further evaluated on nonemergent MRI pituitary protocol.      The study was marked in EPIC for immediate notification.      Workstation performed: [id number]         MRI thoracic spine w wo contrast   Final Interpretation by Michael Campbell MD (04/20 0957)      1.  No evidence of lymphomatous involvement of the spine.   2.  Multilevel spondylotic changes, as detailed above. See narrative above for full details.   3.  Multiple large left thyroid nodules, for which further evaluation with thyroid sonogram is recommended if not previously performed.      The study was marked in EPIC for immediate notification.         Workstation performed: [id number]         MRI lumbar spine w wo contrast   Final Interpretation by Michael Campbell MD (04/20 0957)      1.  No evidence of lymphomatous involvement of the spine.   2.  Multilevel spondylotic changes, as detailed above. See narrative above for full details.   3.  Multiple large left thyroid nodules, for which further evaluation with thyroid sonogram is recommended if not previously performed.      The study was  marked in EPIC for immediate notification.         Workstation performed: [id number]         MRI cervical spine w wo contrast   Final Interpretation by Michael Campbell MD (04/20 0957)      1.  No evidence of lymphomatous involvement of the spine.   2.  Multilevel spondylotic changes, as detailed above. See narrative above for full details.   3.  Multiple large left thyroid nodules, for which further evaluation with thyroid sonogram is recommended if not previously performed.      The study was marked in EPIC for immediate notification.         Workstation performed: [id number]         XR chest portable ICU   Final Interpretation by Yamel Patton MD (04/21 0946)      No acute cardiopulmonary disease.      ET tube 3 cm above the arnav.            Workstation performed: [id number]         MRI follow up neuro   Final Interpretation by Michael Campbell MD (04/18 1212)      Nondiagnostic examination. Recommend repeat examination, as clinically appropriate/tolerable.            Workstation performed: [id number]         CT head wo contrast   Final Interpretation by Tressa Arshad MD (04/14 2030)      Left frontal approach ventriculostomy catheter terminating near the foramen of Monro with increased left frontal lobe pneumocephalus and gas within the left frontal horn since prior study. Slightly increased small amount of layering intraventricular    hemorrhage within the left occipital horn. Improved hydrocephalus primarily of the left lateral ventricle since prior study.      Stable hyperdense mass centered within the left basal ganglia with surrounding vasogenic edema and mild rightward midline shift.      The study was marked in EPIC for immediate notification.         Workstation performed: [id number]         XR chest portable ICU   Final Interpretation by Fareed Roman MD (04/14 0932)      No acute cardiopulmonary disease. Appropriately positioned endotracheal tube.            Workstation performed:  [id number]         CT head wo contrast   Final Interpretation by Len Justice MD (04/13 1722)      Status post placement of a left frontal approach ventriculostomy catheter with the tip terminating adjacent to the previously seen hyperdense mass near the foramen of De Oliveira. Small amount of intraventricular hemorrhage is noted within the left occipital    horn with overall stable degree of hydrocephalus.      Grossly stable hyperdense mass centered within the left basal ganglia. Correlate with prior contrast-enhanced MRI for additional findings.                  Workstation performed: [id number]         CT head wo contrast   Final Interpretation by Daniel Gan MD (04/13 1326)      Predominantly left sided obstructive hydrocephalus from lesion located adjacent to the foramen of Monro is not significantly changed from 4/3/2025.      Blood products within the occipital horn of the left lateral ventricle is similar from MRI of the brain 4/11/2025.      Left anterior basal ganglia mass with surrounding edema, regional mass effect, and effacement of the left suprasellar cistern, appears similar to MRI of the brain 4/11/2025. Please see that report for further characterization of total extent of tumor.                  Resident: CINTHYA FAJARDO I, the attending radiologist, have reviewed the images and agree with the final report above.      Workstation performed: [id number]         MRI Brain BT w wo Contrast   Final Interpretation by Pallav N Shah, MD (04/11 2032)   Addendum (preliminary) 1 of 1 by Pallav N Shah, MD (04/11 2032)   ADDENDUM:      I also verbally communicated these findings to the covering hospitalist,    Dr. Laurence Restrepo at 7:20 p.m.      Final   Interval enlargement of the dominant left anterior basal ganglionic mass lesion with greater surrounding vasogenic edema and mass effect. 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 Differential considerations include lymphoma, multicentric high-grade glioma, and less likely metastasis. Correlation with CSF cytology is recommended.      Small amount of intraventricular hemorrhage.      Interval progression of the previously noted neoplastic disease with the dominant mass in the left anterior basal ganglia and numerous growing ependymal and intraventricular nodular lesions.      The study was marked in EPIC for immediate notification.         Workstation performed: [id number]         FL lumbar puncture diagnostic   Final Interpretation by Grant Galloway DO (04/07 1257)      Successful fluoroscopically guided lumbar puncture.         Workstation performed: [id number]         CT head wo contrast   Final Interpretation by Miranda Burr MD (04/03 1124)      Redemonstrated hyperattenuating subependymal masses better characterized on recent MR. Interval increased ventricular caliber, left greater than right in comparison to recent CT dated 3/29/2025 concerning for worsening hydrocephalus. Recommend    neurosurgical consultation.      The study was marked in EPIC for immediate notification.         Workstation performed: [id number]         FL IN-patient lumbar puncture   Final Interpretation by Elpidio Martinez MD (03/31 1448)      Successful fluoroscopically guided lumbar puncture.      Opening pressure: 15 cm of H20               Workstation performed: [id number]         MRI abdomen w wo contrast   Final Interpretation by Oswaldo Sewell MD (03/31 1021)      1.  No suspicious hepatic lesions. There is a simple right hepatic lobe cyst.   2.  Mild diffuse hepatic steatosis.         Workstation performed: [id number]         CT head wo contrast   Final Interpretation by Lonnie Arellano MD (03/29 2224)      Stable subependymal nodules and left foramen of De Oliveira region hyperdense lesion again seen with associated mild symmetrical dilatation of the left lateral  ventricle and minimal left to right midline shift. The overall findings are unchanged compared to    the prior study. No acute intracranial hemorrhage or evidence of acute cerebral infarction.                  Workstation performed: [id number]         CTA chest abdomen pelvis w wo contrast   Final Interpretation by Oswaldo Sewell MD (03/29 2126)      1.  There is a nonspecific 12 mm hypodense lesion within the right hepatic lobe. While this may represent a hemangioma, a metastatic lesion can not be excluded. This is suboptimally evaluated on the current exam, due to arterial timing of the contrast    bolus and background diffuse hepatic steatosis. Further evaluation by gadolinium enhanced MRI of the abdomen is recommended.   2.  Nonspecific 4 mm right lower lobe pulmonary nodule. In the setting of a known malignancy, a 3-month follow-up chest CT could be performed for further evaluation.   3.  Heterogeneous nodular enlargement of the left thyroid lobe. A nonemergent thyroid ultrasound follow-up is recommended.      4.  Please refer to the report body for description of other incidental, chronic and/or benign findings.      The study was marked in EPIC for significant notification.         Workstation performed: [id number]           Cardiology:  ECG 12 lead   Final Result by Tristian Burroughs MD (04/21 1715)   Marked sinus bradycardia   Abnormal ECG   When compared with ECG of 29-Mar-2025 20:38,   Borderline criteria for Inferior infarct are no longer Present   Confirmed by Tristian Burroughs ([id number]) on 4/21/2025 5:15:56 PM      ECG 12 lead   Final Result by Anthony Anderson MD (03/29 2100)   Age and gender specific ECG analysis    Normal sinus rhythm   Possible Inferior infarct (cited on or before 24-Mar-2025)   Abnormal ECG   Confirmed by Anthony Anderson ([id number]) on 3/29/2025 9:00:53 PM            Results from last 7 days   Lab Units 05/05/25  0439 05/03/25  1555 05/02/25  0544 05/01/25  0612 04/30/25  1144  04/29/25  0348   WBC Thousand/uL 2.45* 3.01* 3.51*  3.51* 4.62 5.69 4.37   HEMOGLOBIN g/dL 9.9* 9.4* 10.1*  10.1* 10.2* 11.3* 13.3   HEMATOCRIT % 28.8* 26.1* 28.2*  28.2* 29.6* 32.4* 38.0   PLATELETS Thousands/uL 191 146* 110*  110* 77* 67* 53*   TOTAL NEUT ABS Thousands/µL 1.95  --  2.51  --   --  3.72         Results from last 7 days   Lab Units 05/05/25  0439 05/04/25  0533 05/03/25  0521 05/02/25  0544 05/01/25  0612   SODIUM mmol/L 145 141 140 137 134*   POTASSIUM mmol/L 4.1 3.8 3.7 4.0 5.3   CHLORIDE mmol/L 104 102 103 102 100   CO2 mmol/L 33* 33* 30 31 28   ANION GAP mmol/L 8 6 7 4 6   BUN mg/dL 28* 24 30* 32* 42*   CREATININE mg/dL 1.36* 1.27 1.52* 1.37* 1.47*   EGFR ml/min/1.73sq m 54 58 47 53 49   CALCIUM mg/dL 8.2* 7.8* 7.8* 8.4 8.2*   CALCIUM, IONIZED mmol/L 1.15  --  1.09*  --   --    PHOSPHORUS mg/dL 3.0 2.5 2.7  --   --      Results from last 7 days   Lab Units 05/05/25  0439 04/29/25  0400   AST U/L 114* 15   ALT U/L 149* 62*   ALK PHOS U/L 54 64   TOTAL PROTEIN g/dL 5.1* 5.8*   ALBUMIN g/dL 3.0* 3.5   TOTAL BILIRUBIN mg/dL 0.50 1.49*     Results from last 7 days   Lab Units 05/05/25  1105 05/05/25  0713 05/04/25  2115 05/04/25  1642 05/04/25  1126 05/04/25  0731 05/03/25  2007 05/03/25  1559 05/03/25  1108 05/03/25  0730 05/02/25  2101 05/02/25  1624   POC GLUCOSE mg/dl 250* 123 208* 213* 245* 190* 288* 219* 244* 189* 306* 165*     Results from last 7 days   Lab Units 05/05/25  0439 05/04/25  0533 05/03/25  0521 05/02/25  0544 05/01/25  0612 04/30/25  1304 04/29/25  0400   GLUCOSE RANDOM mg/dL 155* 190* 183* 158* 203* 197* 143*           Results from last 7 days   Lab Units 05/05/25  0439 05/04/25  0533 05/03/25  0541   PH KRISTEN  7.449* 7.465* 7.486*   PCO2 KRISTEN mm Hg 43.7 41.6* 36.3*   PO2 KRISTEN mm Hg 54.2* 55.0* 46.2*   HCO3 KRISTEN mmol/L 29.6 29.3 26.8   BASE EXC KRISTEN mmol/L 5.1 5.1 3.4   O2 CONTENT KRISTEN ml/dL 13.0 12.6 11.6   O2 HGB, VENOUS % 86.8* 86.1* 79.8         Results from last 7 days   Lab  Units 04/29/25  0406   PROCALCITONIN ng/ml 3.93*     Results from last 7 days   Lab Units 04/29/25  0357   LACTIC ACID mmol/L 1.6         Results from last 7 days   Lab Units 05/04/25  1746 05/04/25  1156 05/04/25  0533 04/30/25  1315 04/29/25  1448   CLARITY UA  Clear Turbid Clear   < > Extra Turbid   COLOR UA  Colorless Light Yellow Colorless   < > Yellow   SPEC GRAV UA  1.006 1.006 1.008   < > 1.014   PH UA  7.5 7.5 8.0   < > 5.5   GLUCOSE UA mg/dl 70 (7/100%)* 200 (1/5%)* Trace*   < > 200 (1/5%)*   KETONES UA mg/dl Negative Negative Negative   < > Negative   BLOOD UA  Trace* Trace* Trace*   < > Moderate*   PROTEIN UA mg/dl Trace* Trace* Negative   < > 50 (1+)*   NITRITE UA  Negative Negative Negative   < > Negative   BILIRUBIN UA  Negative Negative Negative   < > Negative   UROBILINOGEN UA (BE) mg/dl <2.0 <2.0 <2.0   < > <2.0   LEUKOCYTES UA  Negative Negative Negative   < > Large*   WBC UA /hpf 1-2 2-4* 1-2   < > Innumerable*   RBC UA /hpf 1-2 2-4* 10-20*   < > 10-20*   BACTERIA UA /hpf Occasional Occasional Occasional   < > Innumerable*   EPITHELIAL CELLS WET PREP /hpf None Seen None Seen None Seen   < > None Seen   MUCUS THREADS   --   --   --   --  Moderate*    < > = values in this interval not displayed.                                 Results from last 7 days   Lab Units 04/29/25  1448 04/29/25  0341   BLOOD CULTURE   --  No Growth After 5 Days.  Escherichia coli*   GRAM STAIN RESULT   --  Gram negative rods*   URINE CULTURE  >100,000 cfu/ml Escherichia coli*  --                    Network Utilization Review Department  ATTENTION: Please call with any questions or concerns to [phone] and carefully listen to the prompts so that you are directed to the right person. All voicemails are confidential.   For Discharge needs, contact Care Management DC Support Team at [phone] opt. 2  Send all requests for admission clinical reviews, approved or denied determinations and any other requests to dedicated  fax number below belonging to the campus where the patient is receiving treatment. List of dedicated fax numbers for the Facilities:  FACILITY NAME UR FAX NUMBER   ADMISSION DENIALS (Administrative/Medical Necessity) [phone]   DISCHARGE SUPPORT TEAM (NETWORK) [phone]   PARENT CHILD HEALTH (Maternity/NICU/Pediatrics) [phone]   [facility] [phone]   [facility] [phone]   [facility] [phone]   [facility] [phone]   [facility] [phone]   [facility] [phone]   [facility] [phone]   [facility] [phone]   [facility] [phone]   [facility] [phone]   [facility] [phone]   [facility] [phone]

## 2025-05-05 NOTE — PROGRESS NOTES
"Progress Note - Palliative Care   Name: Alexis Dennison 64 y.o. male I MRN: [id number]  Unit/Bed#: [facility] 904-01 I Date of Admission: 3/29/2025   Date of Service: 5/5/2025 I Hospital Day: 37     Assessment & Plan  Palliative care encounter  Palliative diagnosis: newly diagnosed large B cell lymphoma    Symptom management:  Seroquel 12.5mg at lunch 50mg HS (agitation improved on this regimen)  Olanzapine remains available BID PRN but has not received in several days  Goal to wean patient from 1:1 to enable therapy/discharge planning  Decadron re-started at 4mg TID due to increased confusion and edema, plan to wean dose every 4 days   Switched to PO per [facility] requirement  oxyIR 2.5-5mg PO Q4H PRN moderate-severe pain  Acetaminophen 650mg Q6H PRN mild pain  Bowel regimen in place    Goals:  Level 1 code status  Disease focused care without limits placed  Patient initiated high-dose methotrexate + rituximab inpatient  Current focus on rehabilitation, hopeful for discharge to [facility] pending bed availability    Decisional apparatus:  Patient does not have capacity on exam today.  If capacity is lost, patient's substitute decision maker would default to spouse and adult children by [place] Act 169.  ER contacts:  Elizabeth Chester  Daughter  [phone]  Guy Hitchcock \"Naldo\" Phillip  Spouse  [phone]    Patient also has a son, Drew, and another daughter, Marina, who has down syndrome. Elizabeth is the main contact but family makes decisions as unit.    Advance Directive/Living Will/POLST: none on file    Social support:  Patient support system: spouse Naldo, 3 adult children, extended family.  Spouse Naldo lives locally and daughter Elizabeth is from [place] but Naldo prefers that she is primary contact due to language barrier (Chinese)  4/22: Met with Naldo along with palliative SW (Shikha and Brynn) and  (Kaylee #[id number]).  Provided medical update, supportive listening, answered questions to her satisfaction  Normalized experience of " patient/family  Provided anticipatory guidance  Advocated for patient/family with interdisciplinary care team  Alexis is a , consider posey mat or fidget toys    Coordination of care:  Reviewed case with RN, primary team    Follow up  Palliative Care will sign off as symptoms are well controlled and goals are well defined. Recommend outpatient follow-up for ongoing support.  Please reach out via [facility] secure chat if questions or concerns arise.    We appreciate the invitation to be involved in this patient's care.   Primary CNS lymphoma  Appreciate oncology input  methotrexate and rituximab started this admission  Received methotrexate Friday 5/2 (cycle 2)  Weekly rituximab  Encephalopathy  improved  In the setting of the above  EVD dislodged earlier in stay due to  agitation while admitted to ICU but did not need to be replaced secondary to stable ventricle size  Off restraints, on 1:1.  Improves with increased activity, hopeful for discharge to acute rehab   Pulmonary nodule  CTA 3/29 demonstrates non specific 4 mm right lower lobe pulmonary nodule  Recommended 3 mo f/u  Liver lesion  CTA 3/29 demonstrates non specific 12 mm hypodense lesion within right hepatic lobe, MRI recommended  MRI 3/30 demonstrates no suspicious hepatic lesions, simple right hepatic lobe cyst noted    LETY (acute kidney injury) (HCC)  Nephrology following, monitoring methotrexate levels  E coli bacteremia  ID following  E. coli UTI    Interval history:       Received methotrexate on Friday, medically clear for rehab. Awaiting bed at [facility]. Insurance auth received. No doses of PRN olanzapine since last week. Sleep and impulsivity improved. Patient reports feeling well, has been ambulating regularly and with improvement. Appetite is good.     MEDICATIONS / ALLERGIES:     all current active meds have been reviewed    No Known Allergies    OBJECTIVE:    Physical Exam  Physical Exam  Constitutional:       General: He is not in  acute distress.  HENT:      Head:      Comments: Cranial incision CDI  Eyes:      Conjunctiva/sclera: Conjunctivae normal.   Cardiovascular:      Rate and Rhythm: Normal rate.   Pulmonary:      Effort: No respiratory distress.   Abdominal:      General: There is no distension.      Tenderness: There is no guarding.   Genitourinary:     Comments: External urinary catheter  Musculoskeletal:         General: No swelling.   Skin:     General: Skin is warm and dry.   Neurological:      Mental Status: He is alert.      Comments: Disoriented to details but much more lucid than prior encounters   Psychiatric:      Comments: Calm, no longer impulsive         Lab Results:   Results from last 7 days   Lab Units 05/05/25  0439 05/03/25  1555 05/02/25  0544 04/30/25  1144 04/29/25  0348   WBC Thousand/uL 2.45* 3.01* 3.51*  3.51*   < > 4.37   HEMOGLOBIN g/dL 9.9* 9.4* 10.1*  10.1*   < > 13.3   HEMATOCRIT % 28.8* 26.1* 28.2*  28.2*   < > 38.0   PLATELETS Thousands/uL 191 146* 110*  110*   < > 53*   SEGS PCT % 80*  --  73  --  85*   MONO PCT % 4  --  9  --  3*   EOS PCT % 0  --  0  --  1    < > = values in this interval not displayed.     Results from last 7 days   Lab Units 05/05/25  0439 05/04/25  0533 05/03/25  0521 04/30/25  1304 04/29/25  0400   POTASSIUM mmol/L 4.1 3.8 3.7   < > 4.1   CHLORIDE mmol/L 104 102 103   < > 103   CO2 mmol/L 33* 33* 30   < > 28   BUN mg/dL 28* 24 30*   < > 46*   CREATININE mg/dL 1.36* 1.27 1.52*   < > 2.04*   CALCIUM mg/dL 8.2* 7.8* 7.8*   < > 9.6   ALK PHOS U/L 54  --   --   --  64   ALT U/L 149*  --   --   --  62*   AST U/L 114*  --   --   --  15    < > = values in this interval not displayed.       Imaging Studies: reviewed pertinent studies   EKG, Pathology, and Other Studies: reviewed pertinent studies    Counseling / Coordination of Care    Total floor / unit time spent today 20+ minutes. Greater than 50% of total time was spent with the patient and / or family counseling and / or  coordination of care. A description of the counseling / coordination of care: medication review, psychosocial support, chart review, lab review, supportive listening, anticipatory guidance, and coordination with PCA at bedside.

## 2025-05-05 NOTE — PROGRESS NOTES
Progress Note - Nephrology   Name: Alexis Dennison 64 y.o. male I MRN: [id number]  Unit/Bed#: [facility] 904-01 I Date of Admission: 3/29/2025   Date of Service: 5/5/2025 I Hospital Day: 37     Assessment & Plan  LETY (acute kidney injury) (HCC)    The patient has baseline normal renal function creatinine 0.9.  Developed acute kidney injury.  Workup showed no hydronephrosis.  Creatinine today is 1.3 which has improved over the last week.  The patient had received doses of methotrexate urine was alkalinize to help avoid toxicity.  For now creatinine slightly above baseline but stable so we will monitor with supportive care.    Patient is receiving chemotherapy for CNS lymphoma.  Per ID note he was treated for E. coli bacteremia felt due to urinary source.          Subjective     No acute changes clinically.  No distress or complaints for me.    Objective :  Temp:  [97.6 °F (36.4 °C)-98.3 °F (36.8 °C)] 97.9 °F (36.6 °C)  HR:  [60-69] 60  BP: (135-147)/(72-82) 143/76  Resp:  [15-17] 15  SpO2:  [96 %-100 %] 97 %  O2 Device: None (Room air)    Current Weight: Weight - Scale: 75.5 kg (166 lb 7.2 oz)  First Weight: Weight - Scale: 86.2 kg (190 lb)  I/O         05/03 0701  05/04 0700 05/04 0701  05/05 0700 05/05 0701  05/06 0700    P.O. 1400 840     I.V. (mL/kg) 1371.7 (18.2) 1353.3 (17.9)     IV Piggyback 255 150     Total Intake(mL/kg) 3026.7 (40.1) 2343.3 (31)     Urine (mL/kg/hr) 4350 (2.4) 3520 (1.9)     Stool 0 0     Total Output 4350 3520     Net -1323.3 -1176.7            Unmeasured Urine Occurrence  2 x     Unmeasured Stool Occurrence 1 x 1 x           Physical Exam  Constitutional:       General: He is not in acute distress.  HENT:      Head: Normocephalic and atraumatic.      Mouth/Throat:      Mouth: Mucous membranes are dry.   Cardiovascular:      Rate and Rhythm: Normal rate and regular rhythm.   Pulmonary:      Effort: Pulmonary effort is normal. No respiratory distress.      Breath sounds: No wheezing or rales.    Abdominal:      General: Bowel sounds are normal. There is no distension.      Palpations: Abdomen is soft.      Tenderness: There is no abdominal tenderness.         Medications:    Current Facility-Administered Medications:     acetaminophen (TYLENOL) tablet 650 mg, 650 mg, Oral, Q6H PRN, Rustam Drummond MD    allopurinol (ZYLOPRIM) tablet 300 mg, 300 mg, Oral, Daily, Rustam Drummond MD, 300 mg at 05/05/25 0911    alteplase (CATHFLO) injection 2 mg, 2 mg, Intracatheter, Q1MIN PRN, Eva Ortiz MD    aluminum-magnesium hydroxide-simethicone (MAALOX) oral suspension 30 mL, 30 mL, Oral, Q4H PRN, Rustam Drummond MD    atorvastatin (LIPITOR) tablet 20 mg, 20 mg, Oral, Daily, Rustam Drummond MD, 20 mg at 05/05/25 0911    bisacodyl (DULCOLAX) rectal suppository 10 mg, 10 mg, Rectal, Daily, Rustam Drummond MD, 10 mg at 05/01/25 1259    calcium carbonate (TUMS) chewable tablet 1,000 mg, 1,000 mg, Oral, Daily PRN, Rustam Drummond MD    ceFAZolin (ANCEF) IVPB (premix in dextrose) 2,000 mg 50 mL, 2,000 mg, Intravenous, Q8H, Lisa Singh MD, Last Rate: 100 mL/hr at 05/05/25 0231, 2,000 mg at 05/05/25 0231    chlorhexidine (PERIDEX) 0.12 % oral rinse 15 mL, 15 mL, Mouth/Throat, Q12H [facility] hospital, Rustam Drummond MD, 15 mL at 05/05/25 0915    dexamethasone (DECADRON) tablet 4 mg, 4 mg, Oral, Q8H [facility], Dionne Huang MD, 4 mg at 05/05/25 0534    heparin (porcine) subcutaneous injection 5,000 Units, 5,000 Units, Subcutaneous, Q8H [facility], Rustam Drummond MD, 5,000 Units at 05/05/25 0534    insulin glargine (LANTUS) subcutaneous injection 10 Units 0.1 mL, 10 Units, Subcutaneous, , Rustam Drummond MD, 10 Units at 05/04/25 2203    insulin lispro (HumALOG/ADMELOG) 100 units/mL subcutaneous injection 2-12 Units, 2-12 Units, Subcutaneous, 4x Daily (AC & HS), 4 Units at 05/04/25 2202 **AND** Fingerstick Glucose (POCT), , , 4x Daily AC and at bedtime, Dionne Huang,  MD    insulin lispro (HumALOG/ADMELOG) 100 units/mL subcutaneous injection 5 Units, 5 Units, Subcutaneous, TID With Meals, Dionne Huang MD, 5 Units at 05/05/25 0911    melatonin tablet 6 mg, 6 mg, Oral, HS, Rustam Drummond MD, 6 mg at 05/04/25 2202    OLANZapine (ZyPREXA) IM injection 5 mg, 5 mg, Intramuscular, BID PRN, Dionne Huang MD, 5 mg at 05/01/25 1122    ondansetron (ZOFRAN) injection 4 mg, 4 mg, Intravenous, Q6H PRN, Rustam Drummond MD, 4 mg at 04/14/25 0930    oxyCODONE (ROXICODONE) split tablet 2.5 mg, 2.5 mg, Oral, Q4H PRN **OR** oxyCODONE (ROXICODONE) IR tablet 5 mg, 5 mg, Oral, Q4H PRN, Rustam Drummond MD, 5 mg at 04/29/25 1351    pantoprazole (PROTONIX) EC tablet 40 mg, 40 mg, Oral, Early Morning, Rustam Drummond MD, 40 mg at 05/05/25 0534    QUEtiapine (SEROquel) tablet 12.5 mg, 12.5 mg, Oral, Daily, Reji Kay MD, 12.5 mg at 05/04/25 1151    QUEtiapine (SEROquel) tablet 50 mg, 50 mg, Oral, HS, Rylie Stapleton PA-C, 50 mg at 05/04/25 2201    senna (SENOKOT) tablet 17.2 mg, 2 tablet, Oral, HS, Rustam Drummond MD, 17.2 mg at 05/01/25 2325    sodium chloride 0.9 % infusion, 20 mL/hr, Intravenous, Once PRN, Magali Lee MD      Lab Results: I have reviewed the following results:  Results from last 7 days   Lab Units 05/05/25  0439 05/04/25  0533 05/03/25  1555 05/03/25  0521 05/02/25  0544 05/01/25  0612 04/30/25  1304 04/30/25  1144 04/29/25  0400 04/29/25  0348   WBC Thousand/uL 2.45*  --  3.01*  --  3.51*  3.51* 4.62  --  5.69  --  4.37   HEMOGLOBIN g/dL 9.9*  --  9.4*  --  10.1*  10.1* 10.2*  --  11.3*  --  13.3   HEMATOCRIT % 28.8*  --  26.1*  --  28.2*  28.2* 29.6*  --  32.4*  --  38.0   PLATELETS Thousands/uL 191  --  146*  --  110*  110* 77*  --  67*  --  53*   POTASSIUM mmol/L 4.1 3.8  --  3.7 4.0 5.3 4.4  --  4.1  --    CHLORIDE mmol/L 104 102  --  103 102 100 102  --  103  --    CO2 mmol/L 33* 33*  --  30 31 28 28  --  28  --    BUN mg/dL 28*  "24  --  30* 32* 42* 49*  --  46*  --    CREATININE mg/dL 1.36* 1.27  --  1.52* 1.37* 1.47* 1.74*  --  2.04*  --    CALCIUM mg/dL 8.2* 7.8*  --  7.8* 8.4 8.2* 9.1  --  9.6  --    PHOSPHORUS mg/dL 3.0 2.5  --  2.7  --   --   --   --   --   --    ALBUMIN g/dL 3.0*  --   --   --   --   --   --   --  3.5  --        Administrative Statements     Portions of the record may have been created with voice recognition software. Occasional wrong word or \"sound a like\" substitutions may have occurred due to the inherent limitations of voice recognition software. Read the chart carefully and recognize, using context, where substitutions have occurred.If you have any questions, please contact the dictating provider.  "

## 2025-05-05 NOTE — CASE MANAGEMENT
Case Management Discharge Planning Note    Patient name Alexis Dennison  Location [facility] 904/[facility] 904-01 MRN [id number]  : 1960 Date 2025       Current Admission Date: 3/29/2025  Current Admission Diagnosis:Primary CNS lymphoma   Patient Active Problem List    Diagnosis Date Noted Date Diagnosed    E. coli UTI 2025     Sepsis (HCC) 2025     E coli bacteremia 2025     Metabolic alkalosis 2025     LETY (acute kidney injury) (HCC) 2025     Goals of care, counseling/discussion 2025     Palliative care encounter 2025     Primary CNS lymphoma 2025     Encephalopathy 2025     Constipation 2025     Ambulatory dysfunction 2025     Thyroid nodule 2025     Pulmonary nodule 2025     Liver lesion 2025     Primary central nervous system (CNS) lymphoma 2025     Type 2 diabetes mellitus with hyperglycemia, without long-term current use of insulin (HCC) 2022     HTN, goal below 130/80 2022     Mixed hyperlipidemia 2022     Vitamin D deficiency 2022     NAFLD (nonalcoholic fatty liver disease) 2022       LOS (days): 37  Geometric Mean LOS (GMLOS) (days): 11.1  Days to GMLOS:-25.3     OBJECTIVE:  Risk of Unplanned Readmission Score: 40.02         Current admission status: Inpatient   Preferred Pharmacy:   [facility] #[id number] - [place], [place] - [address] [address]  [address] [address]  Phone: [phone] Fax: [phone]    [facility] - [place], [place] - [address]  [address]  [facility] [address]  Phone: [phone] Fax: [phone]    [facility] - [place] - [address]  [address]  [address]  [place] [address]  Phone: [phone] Fax: [phone]    [facility] - [place], [place] - [address]  [address] [facility]  [address]  Phone: [phone] Fax: [phone]    [id number] [place] [place] [place] - [address] [address]  Phone: [phone] Fax: [phone]    Primary Care Provider: PATI Mckeon    Primary Insurance: BLUE CROSS  Secondary Insurance: CAPITAL    DISCHARGE DETAILS:                 Other Referral/Resources/Interventions Provided:  Referral Comments: Auth received for acute rehab. Aidin message placed to [facility] to inqurie if bed is available. Cm to follow.

## 2025-05-05 NOTE — ASSESSMENT & PLAN NOTE
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  Lymphoma/leukemia panel nondiagnostic  CT head on 4/3 with interval increase in ventricular caliber concerning for worsening hydrocephalus. Repeat LP on 4/7 was again undiagnostic.   MRI of the brain from 4/11-\"Interval enlargement of the dominant left anterior basal ganglionic mass lesion with greater surrounding vasogenic edema and mass effect. 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  S/p EVD, self removed over 4/26 weekend  Completed keppra 500mg BID x 7 days for postoperative seizure ppx per neurosurgery  Patient started on chemotherapy while inpatient.  Discussed with oncology and plan for rituximab infusion weekly and methotrexate every 2 weeks.  Patient will require daily serum methotrexate levels until less than 0.1.  Also recommend dexamethasone taper as follows:4 mg Q8H through 5/5, then 4 mg BID x4 days, then 2 mg BID x4 days, then 1 mg BID x4 days, then 1 mg daily x4 days, then discontinue.  Maintain PICC line  Patient medically stable for rehab.  Insurance Auth obtained, awaiting bed  "

## 2025-05-05 NOTE — OCCUPATIONAL THERAPY NOTE
Occupational Therapy Progress Note     Patient Name: Alexis Dennison  Today's Date: 5/5/2025  Problem List  Principal Problem:    Primary CNS lymphoma  Active Problems:    Type 2 diabetes mellitus with hyperglycemia, without long-term current use of insulin (HCC)    HTN, goal below 130/80    Mixed hyperlipidemia    Thyroid nodule    Pulmonary nodule    Liver lesion    Constipation    Ambulatory dysfunction    Encephalopathy    Goals of care, counseling/discussion    Palliative care encounter    LETY (acute kidney injury) (HCC)    Metabolic alkalosis    Sepsis (HCC)    E coli bacteremia    E. coli UTI       05/05/25 1423   OT Last Visit   OT Visit Date 05/05/25   Note Type   Note Type Treatment for insurance authorization   Pain Assessment   Pain Assessment Tool 0-10   Pain Score No Pain   Restrictions/Precautions   Weight Bearing Precautions Per Order No   Other Precautions 1:1;Bed Alarm;Cognitive;Chair Alarm;Fall Risk;Pain   Lifestyle   Autonomy I w/ ADLs, I w/ IADLs, I w/ functional mobility and transfers   Reciprocal Relationships Wife and children   Service to Others Full time employment   Intrinsic Gratification Reading   ADL   Where Assessed Edge of bed   UB Dressing Assistance 4  Minimal Assistance   UB Dressing Deficit Setup;Verbal cueing;Supervision/safety;Increased time to complete;Thread RUE;Thread LUE;Pull around back   UB Dressing Comments Required assist to pull robe around back in stance, VC to thread BUE   LB Dressing Assistance 3  Moderate Assistance   LB Dressing Deficit Setup;Verbal cueing;Supervision/safety;Increased time to complete;Thread RLE into pants;Thread LLE into pants;Pull up over hips;Fasteners   LB Dressing Comments Required assist to pull over hips in stance. Pt able to tie waist band given VC.  Pt able to achieve figure 4 given Max VC for sequencing to doff/don socks at EOB.   Functional Standing Tolerance   Time 3 minutes   Comments Pt tolerates standing ~3 minutes w/ Close S to  "maintain standing balance while completing UB/LB dressing tasks.   Bed Mobility   Supine to Sit 5  Supervision   Additional items HOB elevated;Bedrails;Verbal cues   Sit to Supine Unable to assess   Additional Comments Pt greeted supine and left OOB in chair w/ alarm on and 1:1 present, all needs in reach   Transfers   Sit to Stand 5  Supervision   Additional items Verbal cues   Stand to Sit 5  Supervision   Additional items Verbal cues   Additional Comments x1 w/ RW, x1 no AD   Functional Mobility   Functional Mobility 4  Minimal assistance  (CGA)   Additional Comments Pt completes long household distance mobility CGA w/ RW vs no AD. No overt LOB. Requires VC for directions/sequencing.   Additional items Rolling walker   Subjective   Subjective \"I'm in room 904\"   Cognition   Overall Cognitive Status Impaired   Arousal/Participation Alert;Cooperative   Attention Attends with cues to redirect   Orientation Level Oriented to person;Disoriented to place;Disoriented to time;Disoriented to situation   Memory Decreased recall of precautions;Decreased recall of recent events;Decreased short term memory;Decreased recall of biographical information   Following Commands Follows one step commands without difficulty   Comments pt is pleasant and cooperative. Able to follow one step commands w/out difficutly however requires freqent repetition of cues. Requires VC for sequencing/initiation of tasks, however once started Pt is able to complete w/ less cueing. Becomes more talkative and engaged w/ time/activity.   Activity Tolerance   Activity Tolerance Patient limited by fatigue;Treatment limited secondary to medical complications (Comment)  (cognition)   Medical Staff Made Aware RN cleared   Assessment   Assessment Pt seen for skilled OT treatment session on this date w/ interventions focusing on ADL participation, transfer skills, and fxnl mobility. Pt was agreeable and willing to participate in session. Pt engaged in the " following tasks: Supervision bed mobility, transers; CGA functional mobility; Min A UB dressing; Mod A LB dressing. In comparison to previous session, pt demonstrated improvements in functional mobility w/out AD. Pt required frequent re direction, verbal cues for safety, verbal cues for correct technique, cognitive assistance to anticipate next step, and one step directives. Pt continues to be functioning below baseline level as occupational performance remains limited by decreased ADL status, decreased activity tolerance, decreased endurance, decreased standing balance, decreased transfer skills, decreased fxnl mobility, decreased safety awareness, decreased insight into deficits, and cognition .  From OT standpoint, recommend Level I (Maximum Resource Intensity) at time of d/c. Pt will benefit from continued OT treatment while in acute care to address deficits as defined above and maximize level of functional independence with ADLs and functional mobility. Pt left OOB in chair w/ alarm on and all needs within reach at end of session.   Plan   Treatment Interventions ADL retraining;Functional transfer training;Endurance training;Equipment evaluation/education;Patient/family training;Cognitive reorientation;Compensatory technique education;Continued evaluation;Activityengagement;Energy conservation   Goal Expiration Date 05/13/25   OT Treatment Day 10   OT Frequency 2-3x/wk   Discharge Recommendation   Rehab Resource Intensity Level, OT I (Maximum Resource Intensity)   Additional Comments  The patient's raw score on the AM-PAC Daily Activity Inpatient Short Form is 16. A raw score of less than 19 suggests the patient may benefit from discharge to post-acute rehabilitation services. Please refer to the recommendation of the Occupational Therapist for safe discharge planning.   AM-PAC Daily Activity Inpatient   Lower Body Dressing 2   Bathing 2   Toileting 3   Upper Body Dressing 3   Grooming 3   Eating 3   Daily  Activity Raw Score 16   Daily Activity Standardized Score (Calc for Raw Score >=11) 35.96   AM-PAC Applied Cognition Inpatient   Following a Speech/Presentation 2   Understanding Ordinary Conversation 3   Taking Medications 2   Remembering Where Things Are Placed or Put Away 2   Remembering List of 4-5 Errands 1   Taking Care of Complicated Tasks 1   Applied Cognition Raw Score 11   Applied Cognition Standardized Score 27.03   End of Consult   Education Provided Yes   Patient Position at End of Consult Bedside chair;Bed/Chair alarm activated;All needs within reach  (1:1 present)   Nurse Communication Nurse aware of consult       ALICIA Etienne, OTR/L

## 2025-05-05 NOTE — PROGRESS NOTES
Progress Note - Oncology-Medical   Name: Alexis Dennison 64 y.o. male I MRN: [id number]  Unit/Bed#: [facility] 904-01 I Date of Admission: 3/29/2025   Date of Service: 5/5/2025 I Hospital Day: 37     Assessment & Plan  Primary CNS lymphoma  64 yoM with PMHx of DM2, NAFLD, and HT who presented with progressive encephalopathy found to have multiple scattered nodular cerebral and cerebellar enhancement who underwent two non-diagnostic LPs prompting stereotactic brain biopsy (4/14) which showed large B-cell lymphoma.  See oncology progress note on 4/21 for full diagnostic work up.  On 4/29, agitation led to infectious work up with showed UA+ and BCx with 2/2 E. Coli sensitive to ceftriaxone.  ID was consulted is managing and has approved to restart chemo 5/2.    Treatment Plan: regimen modified from (https://pubmed.ncbi.nlm.nih.gov/51543940/)  High-dose methotrexate every 2 weeks x4 cycles followed by every 4 weeks maintenance (could consider dosing every 4 weeks if he discharges to outside rehab)  C1 (4/17) 8 g/m²  C2 (5/2) 3 g/m², dose-reduced due to LETY, delayed x1 day due to E. coli bacteremia   Urine pH remained above 7.5.,  Was initially started on bicarb that is discontinued now.  Methotrexate level 24 hours post high-dose methotrexate, from 5/3/2025 was less than 0.10.  Will repeat methotrexate level today.    Rituximab (375 mg/m²) weekly x8 weeks  C1D1 (4/18)  C1D8 (4/24)  C2D1 (5/3), ok to treat  C2D8 (5/10), planned  Nephrology on board.   Final path (ancillary testing) still pending from biopsy (4/14)  E coli bacteremia  Patient met sepsis criteria on 4/29 with accompanying confusion and agitation.  UA was positive, UCx and BCx showed E. Coli sensitive to cephalosporins.  ID was consulted, is managing, and has approved to restarting chemo 5/2.  Pulmonary nodule  CTA 3/29 with nonspecific 4 mm RLL pulm nodule, recommendation for 3 month follow up  Liver lesion  CTA 3/29 with nonspecific 12mm hypodense right hepatic  lesion, recommended MRI abdomen  MRI 3/30 with no suspicious hepatic lesions, demonstrated simple right hepatic lobe cyst      Outpatient follow up plan: To be decided.      Communication with patient/family:  I did update the patient.   Communication with team:  Did communicate with primary team.   I did review this patient with Dr. Dumont and they are in agreement with this plan.          Subjective:  Patient seen at bedside, continues one-on-one, remains calm.  No overnight events reported.    Review of Systems   Constitutional:  Negative for activity change, appetite change, fever and unexpected weight change.   Respiratory:  Negative for cough and chest tightness.    Cardiovascular:  Positive for chest pain. Negative for palpitations.   Gastrointestinal:  Negative for abdominal pain, constipation and diarrhea.          Objective:     Medication Administration - last 24 hours from 05/04/2025 1432 to 05/05/2025 1432         Date/Time Order Dose Route Action Action by     05/05/2025 0911 EDT atorvastatin (LIPITOR) tablet 20 mg 20 mg Oral Given Shikha Bonilla RN     05/04/2025 2208 EDT senna (SENOKOT) tablet 17.2 mg 17.2 mg Oral Not Given Maribell Infante RN     05/05/2025 0912 EDT bisacodyl (DULCOLAX) rectal suppository 10 mg 10 mg Rectal Not Given Shikha Bonilla RN     05/05/2025 0915 EDT chlorhexidine (PERIDEX) 0.12 % oral rinse 15 mL 15 mL Mouth/Throat Given Shikha Bonilla RN     05/04/2025 2201 EDT chlorhexidine (PERIDEX) 0.12 % oral rinse 15 mL 15 mL Mouth/Throat Given Maribell Infante RN     05/05/2025 0534 EDT pantoprazole (PROTONIX) EC tablet 40 mg 40 mg Oral Given Maribell Infante RN     05/05/2025 0911 EDT allopurinol (ZYLOPRIM) tablet 300 mg 300 mg Oral Given Shikha Bonilla RN     05/05/2025 0534 EDT heparin (porcine) subcutaneous injection 5,000 Units 5,000 Units Subcutaneous Given Maribell Infante RN     05/04/2025 2201 EDT heparin (porcine) subcutaneous injection 5,000 Units 5,000 Units  Subcutaneous Given Maribell Infante RN     05/04/2025 2202 EDT melatonin tablet 6 mg 6 mg Oral Given Maribell Infante RN     05/04/2025 2203 EDT insulin glargine (LANTUS) subcutaneous injection 10 Units 0.1 mL 10 Units Subcutaneous Given Maribell Infante RN     05/05/2025 1215 EDT QUEtiapine (SEROquel) tablet 12.5 mg 12.5 mg Oral Given Shikha Bonilla RN     05/04/2025 1932 EDT sodium bicarbonate 100 mEq in dextrose 5 % 1,000 mL infusion 0 mL/hr Intravenous Stopped Maribell Infante RN     05/04/2025 2201 EDT QUEtiapine (SEROquel) tablet 50 mg 50 mg Oral Given Maribell Infante RN     05/05/2025 1215 EDT ceFAZolin (ANCEF) IVPB (premix in dextrose) 2,000 mg 50 mL 2,000 mg Intravenous New Bag Shikha Bonilla RN     05/05/2025 0231 EDT ceFAZolin (ANCEF) IVPB (premix in dextrose) 2,000 mg 50 mL 2,000 mg Intravenous New Bag Maribell Infante RN     05/04/2025 1841 EDT ceFAZolin (ANCEF) IVPB (premix in dextrose) 2,000 mg 50 mL 2,000 mg Intravenous New Bag Brianne Lee RN     05/05/2025 0534 EDT dexamethasone (DECADRON) tablet 4 mg 4 mg Oral Given Maribell Infante RN     05/04/2025 2202 EDT dexamethasone (DECADRON) tablet 4 mg 4 mg Oral Given Maribell Infante RN     05/04/2025 2209 EDT leucovorin 25 mg in dextrose 5 % 50 mL IVPB 25 mg Intravenous New Bag Maribell Infante RN     05/04/2025 1543 EDT leucovorin 25 mg in dextrose 5 % 50 mL IVPB 25 mg Intravenous New Bag Brianne Lee, ADELINE     05/05/2025 0911 EDT insulin lispro (HumALOG/ADMELOG) 100 units/mL subcutaneous injection 5 Units 5 Units Subcutaneous Given Shikha Bonilla RN     05/04/2025 1712 EDT insulin lispro (HumALOG/ADMELOG) 100 units/mL subcutaneous injection 5 Units 5 Units Subcutaneous Given Shayla Gaines RN     05/05/2025 1215 EDT insulin lispro (HumALOG/ADMELOG) 100 units/mL subcutaneous injection 2-12 Units 6 Units Subcutaneous Given Shikha Bonilla RN     05/05/2025 0910 EDT insulin lispro (HumALOG/ADMELOG) 100 units/mL subcutaneous injection 2-12 Units --  "Subcutaneous Not Given Shikha Bonilla RN     05/04/2025 2202 EDT insulin lispro (HumALOG/ADMELOG) 100 units/mL subcutaneous injection 2-12 Units 4 Units Subcutaneous Given Maribell Infante RN     05/04/2025 1712 EDT insulin lispro (HumALOG/ADMELOG) 100 units/mL subcutaneous injection 2-12 Units 4 Units Subcutaneous Given Shayla Gaines RN     05/05/2025 1216 EDT insulin lispro (HumALOG/ADMELOG) 100 units/mL subcutaneous injection 8 Units 8 Units Subcutaneous Given Shikha Bonilla RN            /83   Pulse 74   Temp 98.2 °F (36.8 °C)   Resp 17   Ht 5' 10\" (1.778 m)   Wt 75.5 kg (166 lb 7.2 oz)   SpO2 98%   BMI 23.88 kg/m²       Physical Exam  Constitutional:       Appearance: Normal appearance.   Cardiovascular:      Rate and Rhythm: Normal rate and regular rhythm.      Pulses: Normal pulses.      Heart sounds: Normal heart sounds.   Pulmonary:      Effort: Pulmonary effort is normal.      Breath sounds: Normal breath sounds.   Neurological:      Mental Status: He is alert.      Comments: Awake, follows simple commands.           Recent Results (from the past 48 hours)   CBC and Platelet    Collection Time: 05/03/25  3:55 PM   Result Value Ref Range    WBC 3.01 (L) 4.31 - 10.16 Thousand/uL    RBC 3.01 (L) 3.88 - 5.62 Million/uL    Hemoglobin 9.4 (L) 12.0 - 17.0 g/dL    Hematocrit 26.1 (L) 36.5 - 49.3 %    MCV 87 82 - 98 fL    MCH 31.2 26.8 - 34.3 pg    MCHC 36.0 31.4 - 37.4 g/dL    RDW 11.7 11.6 - 15.1 %    Platelets 146 (L) 149 - 390 Thousands/uL    MPV 11.1 8.9 - 12.7 fL   Methotrexate level    Collection Time: 05/03/25  3:55 PM   Result Value Ref Range    Methotrexate Lvl <0.10 umol/L   Fingerstick Glucose (POCT)    Collection Time: 05/03/25  3:59 PM   Result Value Ref Range    POC Glucose 219 (H) 65 - 140 mg/dl   UA (URINE) with reflex to Scope    Collection Time: 05/03/25  6:41 PM   Result Value Ref Range    Color, UA Light Yellow     Clarity, UA Clear     Specific Gravity, UA 1.011 1.003 - " 1.030    pH, UA 7.5 4.5, 5.0, 5.5, 6.0, 6.5, 7.0, 7.5, 8.0    Leukocytes, UA Negative Negative    Nitrite, UA Negative Negative    Protein, UA Negative Negative mg/dl    Glucose,  (1/2%) (A) Negative mg/dl    Ketones, UA Negative Negative mg/dl    Urobilinogen, UA 2.0 (A) <2.0 mg/dl mg/dl    Bilirubin, UA Negative Negative    Occult Blood, UA Small (A) Negative   Urine Microscopic    Collection Time: 05/03/25  6:41 PM   Result Value Ref Range    RBC, UA 10-20 (A) None Seen, 1-2 /hpf    WBC, UA 1-2 None Seen, 1-2 /hpf    Epithelial Cells None Seen None Seen, Occasional /hpf    Bacteria, UA None Seen None Seen, Occasional /hpf   Fingerstick Glucose (POCT)    Collection Time: 05/03/25  8:07 PM   Result Value Ref Range    POC Glucose 288 (H) 65 - 140 mg/dl   UA (URINE) with reflex to Scope    Collection Time: 05/04/25 12:01 AM   Result Value Ref Range    Color, UA Colorless     Clarity, UA Clear     Specific Gravity, UA 1.005 1.003 - 1.030    pH, UA 7.5 4.5, 5.0, 5.5, 6.0, 6.5, 7.0, 7.5, 8.0    Leukocytes, UA Negative Negative    Nitrite, UA Negative Negative    Protein, UA Negative Negative mg/dl    Glucose,  (1/10%) (A) Negative mg/dl    Ketones, UA Negative Negative mg/dl    Urobilinogen, UA <2.0 <2.0 mg/dl mg/dl    Bilirubin, UA Negative Negative    Occult Blood, UA Small (A) Negative   Urine Microscopic    Collection Time: 05/04/25 12:01 AM   Result Value Ref Range    RBC, UA 1-2 None Seen, 1-2 /hpf    WBC, UA None Seen None Seen, 1-2 /hpf    Epithelial Cells None Seen None Seen, Occasional /hpf    Bacteria, UA None Seen None Seen, Occasional /hpf   UA (URINE) with reflex to Scope    Collection Time: 05/04/25  5:33 AM   Result Value Ref Range    Color, UA Colorless     Clarity, UA Clear     Specific Gravity, UA 1.008 1.003 - 1.030    pH, UA 8.0 4.5, 5.0, 5.5, 6.0, 6.5, 7.0, 7.5, 8.0    Leukocytes, UA Negative Negative    Nitrite, UA Negative Negative    Protein, UA Negative Negative mg/dl    Glucose, UA  Trace (A) Negative mg/dl    Ketones, UA Negative Negative mg/dl    Urobilinogen, UA <2.0 <2.0 mg/dl mg/dl    Bilirubin, UA Negative Negative    Occult Blood, UA Trace (A) Negative   Basic metabolic panel    Collection Time: 05/04/25  5:33 AM   Result Value Ref Range    Sodium 141 135 - 147 mmol/L    Potassium 3.8 3.5 - 5.3 mmol/L    Chloride 102 96 - 108 mmol/L    CO2 33 (H) 21 - 32 mmol/L    ANION GAP 6 4 - 13 mmol/L    BUN 24 5 - 25 mg/dL    Creatinine 1.27 0.60 - 1.30 mg/dL    Glucose 190 (H) 65 - 140 mg/dL    Calcium 7.8 (L) 8.4 - 10.2 mg/dL    eGFR 58 ml/min/1.73sq m   Blood gas, venous    Collection Time: 05/04/25  5:33 AM   Result Value Ref Range    pH, Duglas 7.465 (H) 7.300 - 7.400    pCO2, D[place] 41.6 (L) 42.0 - 50.0 mm Hg    pO2, Dug[place] 55.0 (H) 35.0 - 45.0 mm Hg    HCO3, Duglas 29.3 24 - 30 mmol/L    Base Excess, Duglas 5.1 mmol/L    O2 Content, Duglas 12.6 ml/dL    O2 HGB, VENOUS 86.1 (H) 60.0 - 80.0 %   Phosphorus    Collection Time: 05/04/25  5:33 AM   Result Value Ref Range    Phosphorus 2.5 2.3 - 4.1 mg/dL   Uric acid    Collection Time: 05/04/25  5:33 AM   Result Value Ref Range    Uric Acid 2.1 (L) 3.5 - 8.5 mg/dL   Urine Microscopic    Collection Time: 05/04/25  5:33 AM   Result Value Ref Range    RBC, UA 10-20 (A) None Seen, 1-2 /hpf    WBC, UA 1-2 None Seen, 1-2 /hpf    Epithelial Cells None Seen None Seen, Occasional /hpf    Bacteria, UA Occasional None Seen, Occasional /hpf    OTHER CRYSTALS Ammonium Urate /hpf   Fingerstick Glucose (POCT)    Collection Time: 05/04/25  7:31 AM   Result Value Ref Range    POC Glucose 190 (H) 65 - 140 mg/dl   Fingerstick Glucose (POCT)    Collection Time: 05/04/25 11:26 AM   Result Value Ref Range    POC Glucose 245 (H) 65 - 140 mg/dl   UA (URINE) with reflex to Scope    Collection Time: 05/04/25 11:56 AM   Result Value Ref Range    Color, UA Light Yellow     Clarity, UA Turbid     Specific Gravity, UA 1.006 1.003 - 1.030    pH, UA 7.5 4.5, 5.0, 5.5, 6.0, 6.5, 7.0, 7.5, 8.0     Leukocytes, UA Negative Negative    Nitrite, UA Negative Negative    Protein, UA Trace (A) Negative mg/dl    Glucose,  (1/5%) (A) Negative mg/dl    Ketones, UA Negative Negative mg/dl    Urobilinogen, UA <2.0 <2.0 mg/dl mg/dl    Bilirubin, UA Negative Negative    Occult Blood, UA Trace (A) Negative   Urine Microscopic    Collection Time: 05/04/25 11:56 AM   Result Value Ref Range    RBC, UA 2-4 (A) None Seen, 1-2 /hpf    WBC, UA 2-4 (A) None Seen, 1-2 /hpf    Epithelial Cells None Seen None Seen, Occasional /hpf    Bacteria, UA Occasional None Seen, Occasional /hpf   Fingerstick Glucose (POCT)    Collection Time: 05/04/25  4:42 PM   Result Value Ref Range    POC Glucose 213 (H) 65 - 140 mg/dl   UA (URINE) with reflex to Scope    Collection Time: 05/04/25  5:46 PM   Result Value Ref Range    Color, UA Colorless     Clarity, UA Clear     Specific Gravity, UA 1.006 1.003 - 1.030    pH, UA 7.5 4.5, 5.0, 5.5, 6.0, 6.5, 7.0, 7.5, 8.0    Leukocytes, UA Negative Negative    Nitrite, UA Negative Negative    Protein, UA Trace (A) Negative mg/dl    Glucose, UA 70 (7/100%) (A) Negative mg/dl    Ketones, UA Negative Negative mg/dl    Urobilinogen, UA <2.0 <2.0 mg/dl mg/dl    Bilirubin, UA Negative Negative    Occult Blood, UA Trace (A) Negative   Urine Microscopic    Collection Time: 05/04/25  5:46 PM   Result Value Ref Range    RBC, UA 1-2 None Seen, 1-2 /hpf    WBC, UA 1-2 None Seen, 1-2 /hpf    Epithelial Cells None Seen None Seen, Occasional /hpf    Bacteria, UA Occasional None Seen, Occasional /hpf   Fingerstick Glucose (POCT)    Collection Time: 05/04/25  9:15 PM   Result Value Ref Range    POC Glucose 208 (H) 65 - 140 mg/dl   Comprehensive metabolic panel    Collection Time: 05/05/25  4:39 AM   Result Value Ref Range    Sodium 145 135 - 147 mmol/L    Potassium 4.1 3.5 - 5.3 mmol/L    Chloride 104 96 - 108 mmol/L    CO2 33 (H) 21 - 32 mmol/L    ANION GAP 8 4 - 13 mmol/L    BUN 28 (H) 5 - 25 mg/dL    Creatinine 1.36  (H) 0.60 - 1.30 mg/dL    Glucose 155 (H) 65 - 140 mg/dL    Calcium 8.2 (L) 8.4 - 10.2 mg/dL    Corrected Calcium 9.0 8.3 - 10.1 mg/dL     (H) 13 - 39 U/L     (H) 7 - 52 U/L    Alkaline Phosphatase 54 34 - 104 U/L    Total Protein 5.1 (L) 6.4 - 8.4 g/dL    Albumin 3.0 (L) 3.5 - 5.0 g/dL    Total Bilirubin 0.50 0.20 - 1.00 mg/dL    eGFR 54 ml/min/1.73sq m   Phosphorus    Collection Time: 05/05/25  4:39 AM   Result Value Ref Range    Phosphorus 3.0 2.3 - 4.1 mg/dL   Calcium, ionized    Collection Time: 05/05/25  4:39 AM   Result Value Ref Range    Calcium, Ionized 1.15 1.12 - 1.32 mmol/L   Blood gas, venous    Collection Time: 05/05/25  4:39 AM   Result Value Ref Range    pH, Duglas 7.449 (H) 7.300 - 7.400    pCO2, [place] 43.7 42.0 - 50.0 mm Hg    pO2, [place] 54.2 (H) 35.0 - 45.0 mm Hg    HCO3, D[place] 29.6 24 - 30 mmol/L    Base Excess, Dug[place] 5.1 mmol/L    O2 Content, [place] 13.0 ml/dL    O2 HGB, VENOUS 86.8 (H) 60.0 - 80.0 %   CBC and differential    Collection Time: 05/05/25  4:39 AM   Result Value Ref Range    WBC 2.45 (L) 4.31 - 10.16 Thousand/uL    RBC 3.21 (L) 3.88 - 5.62 Million/uL    Hemoglobin 9.9 (L) 12.0 - 17.0 g/dL    Hematocrit 28.8 (L) 36.5 - 49.3 %    MCV 90 82 - 98 fL    MCH 30.8 26.8 - 34.3 pg    MCHC 34.4 31.4 - 37.4 g/dL    RDW 11.7 11.6 - 15.1 %    MPV 10.0 8.9 - 12.7 fL    Platelets 191 149 - 390 Thousands/uL    nRBC 0 /100 WBCs    Segmented % 80 (H) 43 - 75 %    Immature Grans % 1 0 - 2 %    Lymphocytes % 15 14 - 44 %    Monocytes % 4 4 - 12 %    Eosinophils Relative 0 0 - 6 %    Basophils Relative 0 0 - 1 %    Absolute Neutrophils 1.95 1.85 - 7.62 Thousands/µL    Absolute Immature Grans 0.02 0.00 - 0.20 Thousand/uL    Absolute Lymphocytes 0.37 (L) 0.60 - 4.47 Thousands/µL    Absolute Monocytes 0.10 (L) 0.17 - 1.22 Thousand/µL    Eosinophils Absolute 0.01 0.00 - 0.61 Thousand/µL    Basophils Absolute 0.00 0.00 - 0.10 Thousands/µL   Fingerstick Glucose (POCT)    Collection Time: 05/05/25  7:13 AM    Result Value Ref Range    POC Glucose 123 65 - 140 mg/dl   Fingerstick Glucose (POCT)    Collection Time: 05/05/25 11:05 AM   Result Value Ref Range    POC Glucose 250 (H) 65 - 140 mg/dl       CT abdomen pelvis wo contrast  Result Date: 4/30/2025  Narrative: CT ABDOMEN AND PELVIS WITHOUT IV CONTRAST INDICATION: Gram-negative bacteremia, evaluate for source infection. COMPARISON: None. TECHNIQUE: CT examination of the abdomen and pelvis was performed without intravenous contrast. Multiplanar 2D reformatted images were created from the source data. This examination, like all CT scans performed in the [facility], was performed utilizing techniques to minimize radiation dose exposure, including the use of iterative reconstruction and automated exposure control. Radiation dose length product (DLP) for this visit: 560.94 mGy-cm. Enteric Contrast: Not administered. FINDINGS: ABDOMEN LOWER CHEST: Calcified granuloma in the right middle lobe. LIVER/BILIARY TREE: 1 cm cyst in the right lobe. Liver is otherwise unremarkable.1 GALLBLADDER: No calcified gallstones. No pericholecystic inflammatory change. SPLEEN: Unremarkable. PANCREAS: Unremarkable. ADRENAL GLANDS: Unremarkable. KIDNEYS/URETERS: Unremarkable. No hydronephrosis. STOMACH AND BOWEL: Large amount of formed stool throughout the entire colon. No evidence of obstruction. APPENDIX: Normal. ABDOMINOPELVIC CAVITY: No ascites. No pneumoperitoneum. No lymphadenopathy. VESSELS: Unremarkable for patient's age. PELVIS REPRODUCTIVE ORGANS: Unremarkable for patient's age. URINARY BLADDER: Unremarkable. ABDOMINAL WALL/INGUINAL REGIONS: Mild infiltration of the anterior abdominal wall in the left lower quadrant, possibly a soft tissue contusion. BONES: No acute fracture or suspicious osseous lesion.     Impression: No acute inflammatory process in the abdomen or pelvis. Large amount of stool throughout the colon. Workstation performed: [id number]     XR abdomen  obstruction series  Result Date: 4/30/2025  Narrative: XR ABDOMEN OBSTRUCTION SERIES INDICATION: Constipation, lethargy, gram-negative bacteremia. Confusion, newly diagnosed intracranial mass. Constipation. COMPARISON: MRI abdomen 3/30/2025 and CT chest abdomen pelvis 3/29/2025 FINDINGS: Large volume of colonic stool. Otherwise unremarkable bowel gas pattern. No pneumoperitoneum or abnormal air-fluid levels. No pathologic calcification or soft tissue mass. Bones are unremarkable for patient's age. Examination of the chest reveals clear lungs and a normal cardiomediastinal silhouette. Right PICC with tip projecting over the superior cavoatrial junction.     Impression: 1.  Large colonic stool burden without obstruction or other acute abdominal findings. 2.  No acute cardiopulmonary findings. Resident: CINTHYA FAJARDO I, the attending radiologist, have reviewed the images and agree with the final report above. Workstation performed: [id number]     XR chest portable ICU  Result Date: 4/21/2025  Narrative: XR CHEST PORTABLE ICU INDICATION: ETT placement. COMPARISON: CXR 4/13/2025, chest CT 3/29/2025. FINDINGS: ET tube 3 cm above the arnav. Right PICC in upper right atrium. Clear lungs. No pneumothorax or pleural effusion. Normal cardiomediastinal silhouette. Bones are unremarkable for age. Normal upper abdomen.     Impression: No acute cardiopulmonary disease. ET tube 3 cm above the arnav. Workstation performed: [id number]     CT head wo contrast  Result Date: 4/21/2025  Narrative: CT BRAIN - WITHOUT CONTRAST INDICATION:   s/p EVD removal follow up hydrocephalus. COMPARISON: 4/20/2025 TECHNIQUE:  CT examination of the brain was performed.  Multiplanar 2D reformatted images were created from the source data. Radiation dose length product (DLP) for this visit:  1099 mGy-cm .  This examination, like all CT scans performed in the [facility], was performed utilizing techniques to minimize radiation dose  exposure, including the use of iterative reconstruction and automated exposure control. IMAGE QUALITY:  Diagnostic. FINDINGS: PARENCHYMA: Ill-defined mixed density in the left gangliocapsular region corresponds to the lesion seen on prior MRI. Again noted is linear low-attenuation and trace blood products along the ventricular catheter tract, unchanged. VENTRICLES AND EXTRA-AXIAL SPACES: There is increasing ventricular caliber in the interim which is most pronounced involving the left lateral ventricle. There is no evidence of an acute, decompensated hydrocephalus. Small volume blood products in the occipital horns are decreasing. There is peripheral high attenuation along ventricular system which may correspond to subependymal spread of neoplasm. There is no hydrocephalus. 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 VISUALIZED ORBITS: Normal visualized orbits. PARANASAL SINUSES: Normal visualized paranasal sinuses. CALVARIUM AND EXTRACRANIAL SOFT TISSUES: Left frontal siomara hole     Impression: 1. Increasing ventricular caliber without evidence of an acute, decompensated hydrocephalus. Recommend continued close follow-up. 2. Improving intraventricular blood products. The study was marked in EPIC for immediate notification. Workstation performed: [id number]     MRI cervical spine w wo contrast  Result Date: 4/20/2025  Narrative: MRI CERVICAL, THORACIC AND LUMBAR SPINE WITH AND WITHOUT CONTRAST INDICATION: new b cell lymphoma. COMPARISON:  None. TECHNIQUE:  Multiplanar, multisequence imaging of the total spine was performed before and after gadolinium administration. . IV Contrast:  7 mL of Gadobutrol injection (SINGLE-DOSE) IMAGE QUALITY: Motion-degraded, which reduces diagnostic sensitivity. FINDINGS: ALIGNMENT: Normal alignment of the cervical spine. No acute fracture. Multilevel endplate centered marrow changes.  Scattered areas of intrinsic T1/T2 hyperintensity in the vertebral bodies, most consistent with osseous venous malformations/hemangiomas. MARROW SIGNAL: As above. CORD: No cord signal abnormality that can be confirmed in 2 planes. PREVERTEBRAL AND PARASPINAL SOFT TISSUES: No acute abnormality. VISUALIZED POSTERIOR FOSSA: Please refer to concurrent brain MRI. CERVICAL DISC SPACES: Multilevel mild degenerative disc disease. C2-C3: Disc osteophyte complex. No significant neuroforaminal narrowing. Mild spinal canal stenosis. C3-C4: Disc osteophyte complex. Uncovertebral and facet arthropathy contribute to marked bilateral neuroforaminal narrowing, left greater than right. Moderate spinal canal stenosis. C4-C5: Disc osteophyte complex. Uncovertebral and facet arthropathy contribute to mild-moderate bilateral neuroforaminal narrowing. Mild spinal canal stenosis. C5-C6: Disc osteophyte complex. Uncovertebral and facet arthropathy contribute to moderate-marked bilateral neuroforaminal narrowing. Moderate spinal canal stenosis. C6-C7: Disc osteophyte complex. Uncovertebral and facet arthropathy contribute to marked left with moderate right neuroforaminal narrowing. Mild-moderate spinal canal stenosis. C7-T1: No significant neuroforaminal narrowing or spinal canal stenosis. THORACIC DEGENERATIVE CHANGE: Mild spondylotic changes without high-grade neuroforaminal narrowing or spinal canal stenosis. LUMBAR DISC SPACES: Multilevel mild degenerative disc disease. L1-L2: No significant neuroforaminal narrowing or spinal canal stenosis. L2-L3: No significant neuroforaminal narrowing or spinal canal stenosis. L3-L4: Disc bulge. No significant neuroforaminal narrowing. Mild spinal canal stenosis. L4-L5: Disc bulge, noting abutment of the bilateral traversing L5 nerve roots. Mild bilateral neuroforaminal narrowing. Mild spinal canal stenosis. L5-S1: Disc bulge with superimposed central disc herniation, noting abutment of the right  greater than left traversing S1 nerve roots and mild-moderate central spinal canal stenosis. Mild bilateral neuroforaminal narrowing. POSTCONTRAST IMAGING: No abnormal enhancement. OTHER FINDINGS: Retropharyngeal course of the right carotid artery. Multiple large left thyroid nodules measuring greater than 1.5 cm. Scattered T2 hyperintensities in the liver, statistically cyst versus hemangioma. Small amount of debris is noted within the tracheobronchial tree. Patchy atelectasis with small bilateral pleural effusions in the partially visualized lungs. Partially visualized enteric and endotracheal tubes.     Impression: 1.  No evidence of lymphomatous involvement of the spine. 2.  Multilevel spondylotic changes, as detailed above. See narrative above for full details. 3.  Multiple large left thyroid nodules, for which further evaluation with thyroid sonogram is recommended if not previously performed. The study was marked in EPIC for immediate notification. Workstation performed: [id number]     MRI lumbar spine w wo contrast  Result Date: 4/20/2025  Narrative: MRI CERVICAL, THORACIC AND LUMBAR SPINE WITH AND WITHOUT CONTRAST INDICATION: new b cell lymphoma. COMPARISON:  None. TECHNIQUE:  Multiplanar, multisequence imaging of the total spine was performed before and after gadolinium administration. . IV Contrast:  7 mL of Gadobutrol injection (SINGLE-DOSE) IMAGE QUALITY: Motion-degraded, which reduces diagnostic sensitivity. FINDINGS: ALIGNMENT: Normal alignment of the cervical spine. No acute fracture. Multilevel endplate centered marrow changes. Scattered areas of intrinsic T1/T2 hyperintensity in the vertebral bodies, most consistent with osseous venous malformations/hemangiomas. MARROW SIGNAL: As above. CORD: No cord signal abnormality that can be confirmed in 2 planes. PREVERTEBRAL AND PARASPINAL SOFT TISSUES: No acute abnormality. VISUALIZED POSTERIOR FOSSA: Please refer to concurrent brain MRI. CERVICAL DISC  SPACES: Multilevel mild degenerative disc disease. C2-C3: Disc osteophyte complex. No significant neuroforaminal narrowing. Mild spinal canal stenosis. C3-C4: Disc osteophyte complex. Uncovertebral and facet arthropathy contribute to marked bilateral neuroforaminal narrowing, left greater than right. Moderate spinal canal stenosis. C4-C5: Disc osteophyte complex. Uncovertebral and facet arthropathy contribute to mild-moderate bilateral neuroforaminal narrowing. Mild spinal canal stenosis. C5-C6: Disc osteophyte complex. Uncovertebral and facet arthropathy contribute to moderate-marked bilateral neuroforaminal narrowing. Moderate spinal canal stenosis. C6-C7: Disc osteophyte complex. Uncovertebral and facet arthropathy contribute to marked left with moderate right neuroforaminal narrowing. Mild-moderate spinal canal stenosis. C7-T1: No significant neuroforaminal narrowing or spinal canal stenosis. THORACIC DEGENERATIVE CHANGE: Mild spondylotic changes without high-grade neuroforaminal narrowing or spinal canal stenosis. LUMBAR DISC SPACES: Multilevel mild degenerative disc disease. L1-L2: No significant neuroforaminal narrowing or spinal canal stenosis. L2-L3: No significant neuroforaminal narrowing or spinal canal stenosis. L3-L4: Disc bulge. No significant neuroforaminal narrowing. Mild spinal canal stenosis. L4-L5: Disc bulge, noting abutment of the bilateral traversing L5 nerve roots. Mild bilateral neuroforaminal narrowing. Mild spinal canal stenosis. L5-S1: Disc bulge with superimposed central disc herniation, noting abutment of the right greater than left traversing S1 nerve roots and mild-moderate central spinal canal stenosis. Mild bilateral neuroforaminal narrowing. POSTCONTRAST IMAGING: No abnormal enhancement. OTHER FINDINGS: Retropharyngeal course of the right carotid artery. Multiple large left thyroid nodules measuring greater than 1.5 cm. Scattered T2 hyperintensities in the liver, statistically cyst  versus hemangioma. Small amount of debris is noted within the tracheobronchial tree. Patchy atelectasis with small bilateral pleural effusions in the partially visualized lungs. Partially visualized enteric and endotracheal tubes.     Impression: 1.  No evidence of lymphomatous involvement of the spine. 2.  Multilevel spondylotic changes, as detailed above. See narrative above for full details. 3.  Multiple large left thyroid nodules, for which further evaluation with thyroid sonogram is recommended if not previously performed. The study was marked in EPIC for immediate notification. Workstation performed: [id number]     MRI thoracic spine w wo contrast  Result Date: 4/20/2025  Narrative: MRI CERVICAL, THORACIC AND LUMBAR SPINE WITH AND WITHOUT CONTRAST INDICATION: new b cell lymphoma. COMPARISON:  None. TECHNIQUE:  Multiplanar, multisequence imaging of the total spine was performed before and after gadolinium administration. . IV Contrast:  7 mL of Gadobutrol injection (SINGLE-DOSE) IMAGE QUALITY: Motion-degraded, which reduces diagnostic sensitivity. FINDINGS: ALIGNMENT: Normal alignment of the cervical spine. No acute fracture. Multilevel endplate centered marrow changes. Scattered areas of intrinsic T1/T2 hyperintensity in the vertebral bodies, most consistent with osseous venous malformations/hemangiomas. MARROW SIGNAL: As above. CORD: No cord signal abnormality that can be confirmed in 2 planes. PREVERTEBRAL AND PARASPINAL SOFT TISSUES: No acute abnormality. VISUALIZED POSTERIOR FOSSA: Please refer to concurrent brain MRI. CERVICAL DISC SPACES: Multilevel mild degenerative disc disease. C2-C3: Disc osteophyte complex. No significant neuroforaminal narrowing. Mild spinal canal stenosis. C3-C4: Disc osteophyte complex. Uncovertebral and facet arthropathy contribute to marked bilateral neuroforaminal narrowing, left greater than right. Moderate spinal canal stenosis. C4-C5: Disc osteophyte complex. Uncovertebral  and facet arthropathy contribute to mild-moderate bilateral neuroforaminal narrowing. Mild spinal canal stenosis. C5-C6: Disc osteophyte complex. Uncovertebral and facet arthropathy contribute to moderate-marked bilateral neuroforaminal narrowing. Moderate spinal canal stenosis. C6-C7: Disc osteophyte complex. Uncovertebral and facet arthropathy contribute to marked left with moderate right neuroforaminal narrowing. Mild-moderate spinal canal stenosis. C7-T1: No significant neuroforaminal narrowing or spinal canal stenosis. THORACIC DEGENERATIVE CHANGE: Mild spondylotic changes without high-grade neuroforaminal narrowing or spinal canal stenosis. LUMBAR DISC SPACES: Multilevel mild degenerative disc disease. L1-L2: No significant neuroforaminal narrowing or spinal canal stenosis. L2-L3: No significant neuroforaminal narrowing or spinal canal stenosis. L3-L4: Disc bulge. No significant neuroforaminal narrowing. Mild spinal canal stenosis. L4-L5: Disc bulge, noting abutment of the bilateral traversing L5 nerve roots. Mild bilateral neuroforaminal narrowing. Mild spinal canal stenosis. L5-S1: Disc bulge with superimposed central disc herniation, noting abutment of the right greater than left traversing S1 nerve roots and mild-moderate central spinal canal stenosis. Mild bilateral neuroforaminal narrowing. POSTCONTRAST IMAGING: No abnormal enhancement. OTHER FINDINGS: Retropharyngeal course of the right carotid artery. Multiple large left thyroid nodules measuring greater than 1.5 cm. Scattered T2 hyperintensities in the liver, statistically cyst versus hemangioma. Small amount of debris is noted within the tracheobronchial tree. Patchy atelectasis with small bilateral pleural effusions in the partially visualized lungs. Partially visualized enteric and endotracheal tubes.     Impression: 1.  No evidence of lymphomatous involvement of the spine. 2.  Multilevel spondylotic changes, as detailed above. See narrative above  for full details. 3.  Multiple large left thyroid nodules, for which further evaluation with thyroid sonogram is recommended if not previously performed. The study was marked in EPIC for immediate notification. Workstation performed: [id number]     MRI Brain BT w wo Contrast  Result Date: 4/20/2025  Narrative: MRI BRAIN WITH AND WITHOUT CONTRAST INDICATION: Please complete MRI brain w/wo BT protocol. COMPARISON: MRI brain 4/11/2025. TECHNIQUE: Multiplanar, multisequence imaging of the brain and sella was performed before and after gadolinium administration. IV Contrast:  7 mL of Gadobutrol injection IMAGE QUALITY:   Motion-degraded, which reduces diagnostic sensitivity. FINDINGS: BRAIN PARENCHYMA: Redemonstrated mass centered in the left gangliocapsular region, noting slightly increased T2/FLAIR signal abnormality extending inferiorly compared to the prior study 4/11/2025. Decreased mass effect on the lateral ventricles compared to the prior study, noting there is a prior left frontal catheter tract with edema and hemosiderin deposition along the tract. No kerwin hydrocephalus; however, there are intraventricular blood products, new from the MRI 4/11/2025. Patchy nonspecific areas of T2/FLAIR signal abnormality in the left corona radiata/centrum semiovale, for example series 5, image 28) are also again noted. When compared to the prior MRI, there is decreased enhancement associated with the mass, noted to measure approximately 2.7 x 1.8 cm, previously 3.4 x 2.2 cm. Again seen are enhancing foci in the left posterior centrum semiovale measuring up to 4 mm, which could represent perivascular lesions. Redemonstrated leptomeningeal seeding and multiple areas of ependymal nodules and thickening. There is scattered leptomeningeal/ependymal enhancement about the frontal, temporal and posterior horns as well as the dependent aspect of the fourth ventricle.  There is also signal abnormality again noted along the left cerebral  peduncle with faint associated enhancement. Transependymal flow of CSF is slightly decreased in the prior study There is hemosiderin deposition identified along the surface of the brainstem and upper cord. Perfusion: No definite elevated perfusion metrics. Diffusion: The above periventricular and intraventricular lesions demonstrate mild diffusion signal abnormality VENTRICLES: As above. SELLA AND PITUITARY GLAND: Posterior pituitary 6 mm T2 hyperintensity is identified on series 9, image 170. ORBITS: No acute abnormality. PARANASAL SINUSES: Trace mucosal thickening. VASCULATURE:  Evaluation of the major intracranial vasculature demonstrates appropriate flow voids. CALVARIUM AND SKULL BASE: No acute abnormality. EXTRACRANIAL SOFT TISSUES: Fluid secretions are seen in the pharynx. Partially visualized oral route catheters.     Impression: Within the confines of a motion-degraded examination: 1.  Decreased enhancement and perfusion metrics associated with the left gangliocapsular mass (biopsy-proven lymphoma), noting decreased mass effect on the lateral ventricles and decreased transependymal flow of CSF. The change from MRI dated 4/11/2025 could be related to medical therapy. See narrative above for full discussion. 2.  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 3.  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 This could be further evaluated on nonemergent MRI pituitary protocol. The study was marked in EPIC for immediate notification. Workstation performed: [id number]     CT head wo contrast  Result Date: 4/20/2025  Narrative: CT BRAIN - WITHOUT CONTRAST INDICATION:   s/p EVD removal follow up hydrocephalus. COMPARISON: CT head 4/14/2025. TECHNIQUE:  CT examination of the brain was performed.  Multiplanar 2D reformatted images were created from the source data. Radiation dose length product (DLP) for  this visit:  1308 mGy-cm .  This examination, like all CT scans performed in the [facility], was performed utilizing techniques to minimize radiation dose exposure, including the use of iterative reconstruction and automated exposure control. IMAGE QUALITY:  Diagnostic. FINDINGS: PARENCHYMA: Interval removal of left frontal approach ventriculostomy catheter, noting overall stable size and configuration of ventricular system, which again is noted to contain intraventricular blood products. There is hypoattenuation as well as blood  products along the prior catheter tract. Decreased pneumocephalus compared to the prior study. Redemonstrated patchy hypoattenuation corresponding to T2/FLAIR signal abnormality involving the left gangliocapsular region extending into the left frontal lobe inferiorly, better assessed on concurrent brain MRI. VENTRICLES AND EXTRA-AXIAL SPACES: As above. VISUALIZED ORBITS: No acute abnormality. PARANASAL SINUSES: Trace mucosal thickening. CALVARIUM AND EXTRACRANIAL SOFT TISSUES: Partially visualized oral route catheters with adjacent secretions in the oropharynx.     Impression: 1.  Interval removal of left frontal approach ventriculostomy catheter, noting stable size and configuration of the ventricular system, which contains intraventricular blood products. Blood products also noted along the site of the prior catheter tract. 2.  Refer to concurrent brain MRI for characterization of hypoattenuation corresponding to T2/FLAIR signal abnormality involving the left gangliocapsular region extending into the left frontal lobe inferiorly. The study was marked in EPIC for immediate notification. Workstation performed: [id number]     MRI follow up neuro  Result Date: 4/18/2025  Narrative: MRI FOLLOW UP NEURO INDICATION: new b cell lymphoma. COMPARISON:  None. TECHNIQUE:  Multiplanar, multisequence imaging of the thoracic spine was scheduled to be performed. However, due to patient  inability to tolerate the examination, it was prematurely terminated. Localizer series as well as a coronal T2 image were obtained. IMAGE QUALITY: Nondiagnostic. The obtained images are also motion degraded. FINDINGS: Hepatic, pulmonary and thyroid findings are better evaluated on dedicated CTA chest, abdomen and pelvis 3/29/2025.     Impression: Nondiagnostic examination. Recommend repeat examination, as clinically appropriate/tolerable. Workstation performed: [id number]     CT head wo contrast  Result Date: 4/14/2025  Narrative: CT BRAIN - WITHOUT CONTRAST INDICATION:   change in mental status/EVD not draining. COMPARISON: CT head 4/13/2025. TECHNIQUE:  CT examination of the brain was performed.  Multiplanar 2D reformatted images were created from the source data. Radiation dose length product (DLP) for this visit:  1141 mGy-cm .  This examination, like all CT scans performed in the [facility], was performed utilizing techniques to minimize radiation dose exposure, including the use of iterative reconstruction and automated exposure control. IMAGE QUALITY:  Diagnostic. FINDINGS: PARENCHYMA: Stable hyperdense mass centered within the left basal ganglia with surrounding vasogenic edema. Left frontal lobe pneumocephalus and gas within the left frontal horn, increased since prior study. Interval resolution of the previously seen rightward bowing of the septum pellucidum. Stable mild rightward midline shift measuring 3 mm and partial effacement of the left aspect of the suprasellar cistern. There is no CT evidence for a large acute vascular distribution infarct. VENTRICLES AND EXTRA-AXIAL SPACES: Left frontal approach ventriculostomy catheter tip terminates near the foramen of Monro adjacent to the hyperdense mass. Small amount of layering intraventricular hemorrhage within the left occipital horn, slightly increased since prior study. There is improved hydrocephalus primarily of the left lateral  ventricle since prior study. VISUALIZED ORBITS: Normal visualized orbits. PARANASAL SINUSES: Normal visualized paranasal sinuses. CALVARIUM AND EXTRACRANIAL SOFT TISSUES: Normal.     Impression: Left frontal approach ventriculostomy catheter terminating near the foramen of Monro with increased left frontal lobe pneumocephalus and gas within the left frontal horn since prior study. Slightly increased small amount of layering intraventricular hemorrhage within the left occipital horn. Improved hydrocephalus primarily of the left lateral ventricle since prior study. Stable hyperdense mass centered within the left basal ganglia with surrounding vasogenic edema and mild rightward midline shift. The study was marked in EPIC for immediate notification. Workstation performed: [id number]     XR chest portable ICU  Result Date: 4/14/2025  Narrative: XR CHEST PORTABLE ICU INDICATION: intubation. COMPARISON: Chest radiograph 4/13/2019 FINDINGS: Endotracheal tube tip in the midthoracic trachea, approximately 4.5 cm above the arnav. External monitoring leads and clips project over the chest. Enteric catheter tip in the stomach. Clear lungs. No pneumothorax or pleural effusion. Normal cardiomediastinal silhouette. Bones are unremarkable for age. Normal upper abdomen.     Impression: No acute cardiopulmonary disease. Appropriately positioned endotracheal tube. Workstation performed: [id number]     CT head wo contrast  Result Date: 4/13/2025  Narrative: CT BRAIN - WITHOUT CONTRAST INDICATION:   EVD placement. COMPARISON: CT of the head from earlier the same day. TECHNIQUE:  CT examination of the brain was performed.  Multiplanar 2D reformatted images were created from the source data. Radiation dose length product (DLP) for this visit:  1195.54 mGy-cm .  This examination, like all CT scans performed in the [facility], was performed utilizing techniques to minimize radiation dose exposure, including the use of  iterative reconstruction and automated exposure control. IMAGE QUALITY:  Diagnostic. FINDINGS: PARENCHYMA: The hyperdense mass centered within the left basal ganglia with surrounding vision edema is grossly stable. There is postprocedural pneumocephalus. There is also a small amount of gas within the left frontal horn. There is stable rightward bowing of the septum pellucidum. There is stable partial effacement of the left aspect of the suprasellar cistern. There is no CT evidence for a large acute vascular distribution infarct. VENTRICLES AND EXTRA-AXIAL SPACES:  The patient is status post interval placement of a left frontal approach ventriculostomy catheter. The tip terminates adjacent to the previously seen stable hyperdense mass near the foramen of De Oliveira. There is a small amount of hemorrhage layering dependently within the left occipital horn. The overall degree of hydrocephalus is grossly stable since the prior exam. Correlate with prior contrast-enhanced MRI for multiple additional findings of some ependymal spread of disease. VISUALIZED ORBITS: Normal visualized orbits. PARANASAL SINUSES: Normal visualized paranasal sinuses. CALVARIUM AND EXTRACRANIAL SOFT TISSUES: Normal.     Impression: Status post placement of a left frontal approach ventriculostomy catheter with the tip terminating adjacent to the previously seen hyperdense mass near the foramen of De Oliveira. Small amount of intraventricular hemorrhage is noted within the left occipital horn with overall stable degree of hydrocephalus. Grossly stable hyperdense mass centered within the left basal ganglia. Correlate with prior contrast-enhanced MRI for additional findings. Workstation performed: [id number]     CT head wo contrast  Result Date: 4/13/2025  Narrative: CT BRAIN - WITHOUT CONTRAST INDICATION:   Altered mental status. Recently diagnosed brain tumor, altered mental status. COMPARISON: CT of the head 4/3/2025 and MRI of the brain 4/11/2025  TECHNIQUE:  CT examination of the brain was performed.  Multiplanar 2D reformatted images were created from the source data. Radiation dose length product (DLP) for this visit:  1155.94 mGy-cm .  This examination, like all CT scans performed in the [facility], was performed utilizing techniques to minimize radiation dose exposure, including the use of iterative reconstruction and automated exposure control. IMAGE QUALITY:  Diagnostic. FINDINGS: PARENCHYMA: Redemonstrated left anterior basal ganglia mass measuring approximately 3.6 x 3.0 x 1.6 cm (series 2 image 23 and series 303 image 41) with surrounding vasogenic edema, this is grossly stable from MRI of the brain 4/11/2025 but appears more prominent when compared to CT of the head 4/3/2025 where it measured 2.5 x 2.6 x 1.1 cm. This results in regional mass effect and effacement of the left suprasellar cistern, similar to prior MRI. VENTRICLES AND EXTRA-AXIAL SPACES: Hyperdense mass located adjacent to the foramen of Monro measuring 1.4 x 1.3 cm, not significantly changed from CT of the head 4/3/2025 where it causes ipsilateral left lateral ventricle hydrocephalus, similar to 4/3/2025. Fourth ventricle is unremarkable in size. Again noted is small amount of intraventricular hemorrhage within the occipital horn of the left lateral ventricle (series 2 image 20), this appears grossly stable from 4/11/2025. Left hemispheric sulcal effacement secondary to mass  effect from left anterior basal ganglia mass mentioned above. VISUALIZED ORBITS: Normal visualized orbits. PARANASAL SINUSES: Polypoidal mucosal thickening of the left greater than right maxillary sinuses. CALVARIUM AND EXTRACRANIAL SOFT TISSUES: Normal.     Impression: Predominantly left sided obstructive hydrocephalus from lesion located adjacent to the foramen of Monro is not significantly changed from 4/3/2025. Blood products within the occipital horn of the left lateral ventricle is similar  from MRI of the brain 4/11/2025. Left anterior basal ganglia mass with surrounding edema, regional mass effect, and effacement of the left suprasellar cistern, appears similar to MRI of the brain 4/11/2025. Please see that report for further characterization of total extent of tumor. Resident: CINTHYA FAJARDO I, the attending radiologist, have reviewed the images and agree with the final report above. Workstation performed: [id number]     MRI Brain BT w wo Contrast  Addendum Date: 4/11/2025  Addendum: ADDENDUM: I also verbally communicated these findings to the covering hospitalist, Dr. Laurence Restrepo at 7:20 p.m.    Result Date: 4/11/2025  Narrative: MRI BRAIN WITH AND WITHOUT CONTRAST INDICATION: Follow-up of mass lesion, history of headaches. COMPARISON: MRI of the brain from March 26, 2025 TECHNIQUE: Multiplanar, multisequence imaging of the brain and sella was performed before and after gadolinium administration. Imaging performed on 1.5T MRI IV Contrast:  10 mL of Gadobutrol injection IMAGE QUALITY:   Diagnostic. FINDINGS: The previously noted left anterior basal ganglionic mass is larger and more coalescent, measuring approximately 3.5 x 3.1 x 1.6 cm in the transverse, AP and craniocaudad dimensions. There is greater surrounding vasogenic edema and mass effect. As previously noted, the lesion demonstrates leptomeningeal seeding and multiple areas of ependymal nodules and thickening. 1 of these nodules is located in the left foraminal [place] and results in ipsilateral obstructive hydrocephalus with mild transependymal flow of CSF. Intraventricular ependymal enhancement and nodularity is noted within the optic and infundibular recesses of the third ventricle. There is scattered leptomeningeal/ependymal enhancement about the bilateral frontal, temporal and posterior horns as well as the dependent aspect of the fourth ventricle. There is also signal abnormality within the ventral aspect of the left  cerebral peduncle with faint enhancement which may represent an additional parenchymal versus leptomeningeal lesion. There are at least 2 left posterior centrum semiovale nodular lesion which measure 4 mm or less which may represent additional perivascular lesions. These are both slightly more conspicuous than noted on the prior study. Perfusion: ASL imaging demonstrates increased cerebral blood flow with the dominant lesion in the left anterior basal ganglia. Diffusion: The above-mentioned periventricular and intraventricular lesions demonstrate mild restricted diffusion. OTHER FINDINGS: A punctate focus of FLAIR hyperintense signal is now seen within the left lateral frontal subcortical white matter (6:14). This focus does not demonstrate enhancement. VENTRICLES: See above comments regarding the obstruction at the level of the left foramina of De Oliveira and the moderate ipsilateral hydrocephalus. A small amount of hemorrhage is noted within the left occipital horn. SELLA AND PITUITARY GLAND:  Normal. ORBITS:  Normal. PARANASAL SINUSES:  Normal. VASCULATURE:  Evaluation of the major intracranial vasculature demonstrates appropriate flow voids. CALVARIUM AND SKULL BASE:  Normal. EXTRACRANIAL SOFT TISSUES:  Normal.     Impression: Interval enlargement of the dominant left anterior basal ganglionic mass lesion with greater surrounding vasogenic edema and mass effect. 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 Differential considerations include lymphoma, multicentric high-grade glioma, and less likely metastasis. Correlation with CSF cytology is recommended. Small amount of intraventricular hemorrhage. Interval progression of the previously noted neoplastic disease with the dominant mass in the left anterior basal ganglia and numerous growing ependymal and intraventricular nodular lesions. The study was marked in EPIC for immediate  notification. Workstation performed: [id number]     FL lumbar puncture diagnostic  Result Date: 4/7/2025  Narrative: FLUOROSCOPICALLY GUIDED LUMBAR PUNCTURE INDICATION: Brain lesion FLUOROSCOPY TIME:  16 seconds IMAGES: 2 TECHNIQUE: Consent was obtained after fully explaining the procedure to the patient. Risks and benefits of procedure were described and understood. Precautions to avoid spinal headache were reviewed. 1% lidocaine was infiltrated at the puncture site. Utilizing Left paravertebral approach, a 20 gauge spinal needle was advanced under fluoroscopic guidance into the subarachnoid space at the L L3-4 level, utilizing sterile technique. Once in position, approximately 17 cc of yellow-tinged CSF were removed  and placed into 4 tubes which subsequently were transported to the lab for requested analysis. The needle was removed. The patient tolerated the procedure well. The patient was discharged from the department with appropriate instructions.     Impression: Successful fluoroscopically guided lumbar puncture. Workstation performed: [id number]       I have personally reviewed labs, imaging studies, and pertinent reports.      This note has been generated by voice recognition software system.  Therefore, there may be spelling, grammar, and or syntax errors. Please contact if questions arise.

## 2025-05-05 NOTE — PROGRESS NOTES
"Progress Note - Hospitalist   Name: Alexis Dennison 64 y.o. male I MRN: [id number]  Unit/Bed#: [facility] 904-01 I Date of Admission: 3/29/2025   Date of Service: 5/5/2025 I Hospital Day: 37    Assessment & Plan  Primary CNS lymphoma  Patient with development of worsening confusion, recently seen at the [facility] ED on 3/24/2025 with CT head showing brain lesion   MRI of the brain 3/26/2025 concerning for \"multiple scattered areas of subependymoma nodular enhancement throughout ventricles with mild hydrocephalus left worse than right, scattered nodular areas of enhancement in left anterior inferior basal ganglia involving left anterior commissure, and smaller foci of nodular enhancement along anteromedial aspect of the left cerebral peduncle and left quirino.\"  With brain compression  (minimal left to right shift), mild hydrocephalus as evidenced by initial imaging  S/p LP on 3/31. 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  Lymphoma/leukemia panel nondiagnostic  CT head on 4/3 with interval increase in ventricular caliber concerning for worsening hydrocephalus. Repeat LP on 4/7 was again undiagnostic.   MRI of the brain from 4/11-\"Interval enlargement of the dominant left anterior basal ganglionic mass lesion with greater surrounding vasogenic edema and mass effect. 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  S/p EVD, self removed over 4/26 weekend  Completed keppra 500mg BID x 7 days for postoperative seizure ppx per neurosurgery  Patient started on chemotherapy while inpatient.  Discussed with oncology and plan for rituximab infusion weekly and methotrexate every 2 weeks.  Patient will require daily serum methotrexate levels until less than 0.1.  Also recommend dexamethasone taper as follows:4 mg Q8H through 5/5, then 4 mg BID x4 days, then 2 mg BID x4 days, then " 1 mg BID x4 days, then 1 mg daily x4 days, then discontinue.  Maintain PICC line  Patient medically stable for rehab.  Insurance Auth obtained, awaiting bed  Encephalopathy  Multifactorial in the setting of brain mass, vasogenic edema, medication induced and hospital-acquired delirium  Continue Seroquel   On steroid taper as above  Continue delirium precautions  Patient on 1:1 for safety  Sepsis (HCC)  As evidenced on 4/29 morning with temperature and tachycardia.  Worsening kidney function.  Lactate within normal limits  S/p 1 L bolus, continue IV fluids  Elevated procalcitonin in the setting of recent rituximab  Secondary to UTI, bacteremia  UA with innumerable bacteria and large leukocytes  Urine culture with E. coli  Blood culture with 1 out of 2 with E. coli  On IV antibiotics per ID through 5/5  E coli bacteremia  Blood cultures with 1 out of 2 with E. Coli  CT abdomen pelvis on 4/30 with no acute pathology  On IV antibiotics per ID through 5/5  E. coli UTI  As above  Constipation  Abdomen x-ray on 4/29 with large colonic stool burden without obstruction  CT abdomen pelvis on 4/30 with constipation  S/p enema and suppository  Resolved  Continue bowel regimen  Ambulate patient as able  LETY (acute kidney injury) (Former[facility])  Lab Results   Component Value Date    CREATININE 1.36 (H) 05/05/2025    CREATININE 1.27 05/04/2025    CREATININE 1.52 (H) 05/03/2025     Baseline creatinine around 0.9, peak creatinine 2.7  Patient was evaluated by nephrology during this hospital stay and given improvement of creatinine with IV fluids signed off on 4/25.  Reconsulted on 5/2 for monitoring post methotrexate  Creatinine has remained stable  Continue to monitor.  Avoid nephrotoxins  Ambulatory dysfunction  Plan for acute rehab  Fall precautions  Ambulate patient  Type 2 diabetes mellitus with hyperglycemia, without long-term current use of insulin ([facility])  Hemoglobin A1c 6.6% on 2/22/2025  PTA metformin 1000 mg daily, holding  On  Lantus 10 units at bedtime   Accu-Cheks reviewed and prandial glucose above goal  Hyperglycemic secondary to steroid use  Increase lispro to 8 units 3 times daily, insulin sliding scale  Hypoglycemia protocol  Mixed hyperlipidemia  Continue statin  HTN, goal below 130/80  PTA losartan and hydrochlorothiazide, held on admission  BP reviewed and acceptable without medications  Liver lesion  MRI obtained: No suspicious hepatic lesions.  There is a simple right hepatic lobe cyst.  Mild diffuse hepatic steatosis.  LFTs stable  Outpatient follow up advised  Thyroid nodule  Incidental findings on CT scan  Nonemergent thyroid ultrasound for follow-up in the outpatient setting  Pulmonary nodule  Imaging with 4 mm right lower lobe pulmonary nodule.  CT chest 3-month follow-up    VTE Pharmacologic Prophylaxis: VTE Score: 5 High Risk (Score >/= 5) - Pharmacological DVT Prophylaxis Ordered: heparin. Sequential Compression Devices Ordered.    Mobility:   Basic Mobility Inpatient Raw Score: 18  JH-HLM Goal: 6: Walk 10 steps or more  JH-HLM Achieved: 8: Walk 250 feet ot more  JH-HLM Goal achieved. Continue to encourage appropriate mobility.    Patient Centered Rounds: I performed bedside rounds with nursing staff today.   Discussions with Specialists or Other Care Team Provider: Case management    Education and Discussions with Family / Patient: Updated  (daughter) via phone.    Current Length of Stay: 37 day(s)  Current Patient Status: Inpatient   Certification Statement: The patient will continue to require additional inpatient hospital stay due to as above  Discharge Plan: Anticipate discharge in 24-48 hrs to rehab facility.    Code Status: Level 1 - Full Code    Subjective   No events overnight.  Patient has no complaints this morning.  Tolerating oral intake with no nausea or vomiting.  Denies pain    Objective :  Temp:  [97.6 °F (36.4 °C)-98.3 °F (36.8 °C)] 97.9 °F (36.6 °C)  HR:  [60-69] 60  BP:  (135-147)/(72-82) 143/76  Resp:  [15-17] 15  SpO2:  [96 %-100 %] 97 %  O2 Device: None (Room air)    Body mass index is 23.88 kg/m².     Input and Output Summary (last 24 hours):     Intake/Output Summary (Last 24 hours) at 5/5/2025 0852  Last data filed at 5/5/2025 0600  Gross per 24 hour   Intake 2343.33 ml   Output 3120 ml   Net -776.67 ml       Physical Exam  Vitals and nursing note reviewed.   Constitutional:       General: He is not in acute distress.     Appearance: He is well-developed.   HENT:      Head: Normocephalic and atraumatic.   Eyes:      Conjunctiva/sclera: Conjunctivae normal.   Cardiovascular:      Rate and Rhythm: Normal rate and regular rhythm.   Pulmonary:      Effort: No respiratory distress.      Breath sounds: Normal breath sounds. No wheezing or rhonchi.   Musculoskeletal:         General: No swelling.      Cervical back: Neck supple.   Skin:     General: Skin is warm and dry.      Capillary Refill: Capillary refill takes less than 2 seconds.   Neurological:      Mental Status: He is alert. He is disoriented.   Psychiatric:         Mood and Affect: Mood normal.         Behavior: Behavior normal.         Lines/Drains:  Lines/Drains/Airways       Active Status       Name Placement date Placement time Site Days    PICC Line 05/01/25 Left Brachial 05/01/25  1210  Brachial  3    External Urinary Catheter 05/03/25  0206  -- 2                    Central Line:  Goal for removal: Will discontinue when meds requiring line are completed.               Lab Results: I have reviewed the following results:   Results from last 7 days   Lab Units 05/05/25  0439   WBC Thousand/uL 2.45*   HEMOGLOBIN g/dL 9.9*   HEMATOCRIT % 28.8*   PLATELETS Thousands/uL 191   SEGS PCT % 80*   LYMPHO PCT % 15   MONO PCT % 4   EOS PCT % 0     Results from last 7 days   Lab Units 05/05/25  0439   SODIUM mmol/L 145   POTASSIUM mmol/L 4.1   CHLORIDE mmol/L 104   CO2 mmol/L 33*   BUN mg/dL 28*   CREATININE mg/dL 1.36*   ANION GAP  mmol/L 8   CALCIUM mg/dL 8.2*   ALBUMIN g/dL 3.0*   TOTAL BILIRUBIN mg/dL 0.50   ALK PHOS U/L 54   ALT U/L 149*   AST U/L 114*   GLUCOSE RANDOM mg/dL 155*         Results from last 7 days   Lab Units 05/05/25  0713 05/04/25  2115 05/04/25  1642 05/04/25  1126 05/04/25  0731 05/03/25  2007 05/03/25  1559 05/03/25  1108 05/03/25  0730 05/02/25  2101 05/02/25  1624 05/02/25  1035   POC GLUCOSE mg/dl 123 208* 213* 245* 190* 288* 219* 244* 189* 306* 165* 268*         Results from last 7 days   Lab Units 04/29/25  0406 04/29/25  0357   LACTIC ACID mmol/L  --  1.6   PROCALCITONIN ng/ml 3.93*  --        Recent Cultures (last 7 days):   Results from last 7 days   Lab Units 04/29/25  1448 04/29/25  0341   BLOOD CULTURE   --  No Growth After 5 Days.  Escherichia coli*   GRAM STAIN RESULT   --  Gram negative rods*   URINE CULTURE  >100,000 cfu/ml Escherichia coli*  --        Imaging Results Review: No pertinent imaging studies reviewed.  Other Study Results Review: No additional pertinent studies reviewed.    Last 24 Hours Medication List:     Current Facility-Administered Medications:     acetaminophen (TYLENOL) tablet 650 mg, Q6H PRN    allopurinol (ZYLOPRIM) tablet 300 mg, Daily    alteplase (CATHFLO) injection 2 mg, Q1MIN PRN    aluminum-magnesium hydroxide-simethicone (MAALOX) oral suspension 30 mL, Q4H PRN    atorvastatin (LIPITOR) tablet 20 mg, Daily    bisacodyl (DULCOLAX) rectal suppository 10 mg, Daily    calcium carbonate (TUMS) chewable tablet 1,000 mg, Daily PRN    ceFAZolin (ANCEF) IVPB (premix in dextrose) 2,000 mg 50 mL, Q8H, Last Rate: 2,000 mg (05/05/25 0231)    chlorhexidine (PERIDEX) 0.12 % oral rinse 15 mL, Q12H CAIT    dexamethasone (DECADRON) tablet 4 mg, Q8H CAIT    heparin (porcine) subcutaneous injection 5,000 Units, Q8H CAIT    insulin glargine (LANTUS) subcutaneous injection 10 Units 0.1 mL, HS    insulin lispro (HumALOG/ADMELOG) 100 units/mL subcutaneous injection 2-12 Units, 4x Daily (AC & HS)  **AND** Fingerstick Glucose (POCT), 4x Daily AC and at bedtime    insulin lispro (HumALOG/ADMELOG) 100 units/mL subcutaneous injection 5 Units, TID With Meals    melatonin tablet 6 mg, HS    OLANZapine (ZyPREXA) IM injection 5 mg, BID PRN    ondansetron (ZOFRAN) injection 4 mg, Q6H PRN    oxyCODONE (ROXICODONE) split tablet 2.5 mg, Q4H PRN **OR** oxyCODONE (ROXICODONE) IR tablet 5 mg, Q4H PRN    pantoprazole (PROTONIX) EC tablet 40 mg, Early Morning    QUEtiapine (SEROquel) tablet 12.5 mg, Daily    QUEtiapine (SEROquel) tablet 50 mg, HS    senna (SENOKOT) tablet 17.2 mg, HS    sodium chloride 0.9 % infusion, Once PRN    Administrative Statements   Today, Patient Was Seen By: Dionne Huang MD      **Please Note: This note may have been constructed using a voice recognition system.**

## 2025-05-05 NOTE — PHYSICAL THERAPY NOTE
PHYSICAL THERAPY NOTE          Patient Name: Alexis Dennison  Today's Date: 5/5/2025 05/05/25 1056   PT Last Visit   PT Visit Date 05/05/25   Note Type   Note Type Treatment for insurance authorization   Pain Assessment   Pain Assessment Tool 0-10   Pain Score No Pain   Restrictions/Precautions   Weight Bearing Precautions Per Order No   Other Precautions 1:1;Cognitive;Chair Alarm;Bed Alarm;Fall Risk   General   Chart Reviewed Yes   Response to Previous Treatment Patient with no complaints from previous session.   Family/Caregiver Present No   Cognition   Overall Cognitive Status Impaired   Arousal/Participation Alert;Responsive;Cooperative   Attention Attends with cues to redirect   Following Commands Follows one step commands without difficulty   Comments pt very pleasant and cooperative, less distractable than previous sessions. more talkative and engaged with therapist   Bed Mobility   Supine to Sit Unable to assess   Sit to Supine Unable to assess   Additional Comments pt found/ left sitting OOB in recliner with all needs within reach- alarm on post session   Transfers   Sit to Stand 5  Supervision   Additional items Verbal cues   Stand to Sit 5  Supervision   Additional items Verbal cues   Additional Comments transfers with RW vs no AD; completes 4x STS t/o session   Ambulation/Elevation   Gait pattern Short stride;Decreased foot clearance   Gait Assistance 4  Minimal assist  (CGA)   Additional items Assist x 1;Verbal cues   Assistive Device Rolling walker;None   Distance 150' x2 with RW; 2 x 60' no AD   Stair Management Assistance Not tested  (would like to trial in upcoming therapy sessions as appropriate/ able)   Balance   Static Sitting Fair +   Dynamic Sitting Fair   Static Standing Fair   Dynamic Standing Fair -   Ambulatory Poor +   Endurance Deficit   Endurance Deficit Yes   Activity Tolerance   Activity Tolerance  Patient tolerated treatment well   Medical Staff Made Aware PCA   Nurse Made Aware RN cleared   Exercises   Neuro re-ed pt participating in ring toss + horse shoe activity with CS/ occasional CGA for required for balance. VC required to complete   Balance training  pt participating in hallway scavenger hunt as well as pattern activity with different colored shapes. VC for completness   Assessment   Prognosis Good   Problem List Decreased strength;Decreased endurance;Decreased mobility;Decreased coordination;Impaired balance;Decreased cognition;Impaired judgement;Decreased safety awareness   Assessment Pt seen for PT treatment session this date. Therapy session focused on functional transfers, gait training and endurance training in order to improve overall mobility and independence. Pt requires assist of 1 for all mobility performed this date. Pt making steady progress toward goals as demonstrated by engaging in multiple balance activities as well as cognitive tasks as well as walking community distances. Continues to have improved balance with RW, however would like to continue to progress to LRAD. Continues to require cues for increasing strength length and widening base of support. Able to follow multiple step commands this date, with less distraction than previous sessions. Pt was left sitting OOB in recliner at the end of PT session with all needs in reach. Pt would benefit from continued PT services while in hospital to address remaining limitations. PT to continue to follow pt and recommends level 1. The patient's AM-PAC Basic Mobility Inpatient Short Form Raw Score is 17. A Raw score of greater than 16 suggests the patient may benefit from discharge to home. Please also refer to the recommendation of the Physical Therapist for safe discharge planning.   Barriers to Discharge Inaccessible home environment;Decreased caregiver support   Goals   Patient Goals to leave the hospital   STG Expiration Date 05/09/25    PT Treatment Day 11   Plan   Treatment/Interventions ADL retraining;LE strengthening/ROM;Functional transfer training;Therapeutic exercise;Elevations;Endurance training;Patient/family training;Cognitive reorientation;Equipment eval/education;Bed mobility;Gait training;Spoke to nursing;Spoke to case management;OT   Progress Progressing toward goals   PT Frequency 3-5x/wk   Discharge Recommendation   Rehab Resource Intensity Level, PT I (Maximum Resource Intensity)   Equipment Recommended Walker   Walker Package Recommended Wheeled walker   AM-PAC Basic Mobility Inpatient   Turning in Flat Bed Without Bedrails 3   Lying on Back to Sitting on Edge of Flat Bed Without Bedrails 3   Moving Bed to Chair 3   Standing Up From Chair Using Arms 3   Walk in Room 3   Climb 3-5 Stairs With Railing 2   Basic Mobility Inpatient Raw Score 17   Basic Mobility Standardized Score 39.67   [facility] Highest Level Of Mobility   -HL Goal 5: Stand one or more mins   -HLM Achieved 8: Walk 250 feet ot more   Education   Education Provided Mobility training;Assistive device   Patient Reinforcement needed   End of Consult   Patient Position at End of Consult Bedside chair;Bed/Chair alarm activated;All needs within reach  (1:1 present)     Deepthi Granados, PT, DPT

## 2025-05-05 NOTE — ASSESSMENT & PLAN NOTE
Hemoglobin A1c 6.6% on 2/22/2025  PTA metformin 1000 mg daily, holding  On Lantus 10 units at bedtime   Accu-Cheks reviewed and prandial glucose above goal  Hyperglycemic secondary to steroid use  Increase lispro to 8 units 3 times daily, insulin sliding scale  Hypoglycemia protocol

## 2025-05-05 NOTE — ASSESSMENT & PLAN NOTE
Pyuria, recent external catheter.  No evidence for retention.  Unreliable historian in terms of symptoms.    Complete antibiotics as above  Follow UOP closely

## 2025-05-05 NOTE — CASE MANAGEMENT
Case Management Discharge Planning Note    Patient name Alexis Dennison  Location [facility]01 MRN [id number]  : 1960 Date 2025       Current Admission Date: 3/29/2025  Current Admission Diagnosis:Primary CNS lymphoma   Patient Active Problem List    Diagnosis Date Noted Date Diagnosed    E. coli UTI 2025     Sepsis (HCC) 2025     E coli bacteremia 2025     Metabolic alkalosis 2025     LETY (acute kidney injury) (HCC) 2025     Goals of care, counseling/discussion 2025     Palliative care encounter 2025     Primary CNS lymphoma 2025     Encephalopathy 2025     Constipation 2025     Ambulatory dysfunction 2025     Thyroid nodule 2025     Pulmonary nodule 2025     Liver lesion 2025     Primary central nervous system (CNS) lymphoma 2025     Type 2 diabetes mellitus with hyperglycemia, without long-term current use of insulin (HCC) 2022     HTN, goal below 130/80 2022     Mixed hyperlipidemia 2022     Vitamin D deficiency 2022     NAFLD (nonalcoholic fatty liver disease) 2022       LOS (days): 37  Geometric Mean LOS (GMLOS) (days): 11.1  Days to GMLOS:-25.5     OBJECTIVE:  Risk of Unplanned Readmission Score: 45.43         Current admission status: Inpatient   Preferred Pharmacy:   [facility] #[id number] - [place], [place] - [address] [address]  [address] [address]  Phone: [phone] Fax: [phone]    [facility] - [place], [place] - [address]  [address]  [facility] [address]  Phone: [phone] Fax: [phone]    [facility] - [place] - [address]  [address]  [address]  [place] [address]  Phone: [phone] Fax: [phone]    [facility] - [place], [place] - [address]  [address] [facility]  [address]  Phone: [phone] Fax: [phone]    [facility] ([facility] #[id number] - [place] - [address] [address]  Phone: [phone] Fax: [phone]    Primary Care Provider: PATI Mckeon    Primary Insurance: BLUE CROSS  Secondary Insurance: CAPITAL    DISCHARGE DETAILS:                          Other Referral/Resources/Interventions Provided:  Referral Comments: S/w SLIM during care coordination rounds & informed oncology placed a note on Friday with details pt will need during rehab. TC kerry Schulz at [facility] admissions & informed him of notes. Awaiting for bed to open.

## 2025-05-05 NOTE — ASSESSMENT & PLAN NOTE
Appreciate oncology input  methotrexate and rituximab started this admission  Received methotrexate Friday 5/2 (cycle 2)  Weekly rituximab

## 2025-05-05 NOTE — PLAN OF CARE
Problem: PHYSICAL THERAPY ADULT  Goal: Performs mobility at highest level of function for planned discharge setting.  See evaluation for individualized goals.  Description: Treatment/Interventions: Functional transfer training, LE strengthening/ROM, Therapeutic exercise, Endurance training, Cognitive reorientation, Patient/family training, Bed mobility, Gait training, Elevations  Equipment Recommended: Walker       See flowsheet documentation for full assessment, interventions and recommendations.  Note: Prognosis: Good  Problem List: Decreased strength, Decreased endurance, Decreased mobility, Decreased coordination, Impaired balance, Decreased cognition, Impaired judgement, Decreased safety awareness  Assessment: Pt seen for PT treatment session this date. Therapy session focused on functional transfers, gait training and endurance training in order to improve overall mobility and independence. Pt requires assist of 1 for all mobility performed this date. Pt making steady progress toward goals as demonstrated by engaging in multiple balance activities as well as cognitive tasks as well as walking community distances. Continues to have improved balance with RW, however would like to continue to progress to LRAD. Continues to require cues for increasing strength length and widening base of support. Able to follow multiple step commands this date, with less distraction than previous sessions. Pt was left sitting OOB in recliner at the end of PT session with all needs in reach. Pt would benefit from continued PT services while in hospital to address remaining limitations. PT to continue to follow pt and recommends level 1. The patient's AM-PAC Basic Mobility Inpatient Short Form Raw Score is 17. A Raw score of greater than 16 suggests the patient may benefit from discharge to home. Please also refer to the recommendation of the Physical Therapist for safe discharge planning.  Barriers to Discharge: Inaccessible home  environment, Decreased caregiver support     Rehab Resource Intensity Level, PT: I (Maximum Resource Intensity)    See flowsheet documentation for full assessment.

## 2025-05-05 NOTE — ASSESSMENT & PLAN NOTE
Fever, HR.  Due to bacteremia, UTI.  No other appreciable source of infection.  ROS and exam otherwise benign..  Hemodynamically stable and nontoxic.  Improved.    Complete antibiotics today as below  Follow temperatures closely  Supportive care as per the primary service

## 2025-05-05 NOTE — ASSESSMENT & PLAN NOTE
Lab Results   Component Value Date    CREATININE 1.36 (H) 05/05/2025    CREATININE 1.27 05/04/2025    CREATININE 1.52 (H) 05/03/2025     Baseline creatinine around 0.9, peak creatinine 2.7  Patient was evaluated by nephrology during this hospital stay and given improvement of creatinine with IV fluids signed off on 4/25.  Reconsulted on 5/2 for monitoring post methotrexate  Creatinine has remained stable  Continue to monitor.  Avoid nephrotoxins

## 2025-05-05 NOTE — PLAN OF CARE
Problem: PAIN - ADULT  Goal: Verbalizes/displays adequate comfort level or baseline comfort level  Description: Interventions:- Encourage patient to monitor pain and request assistance- Assess pain using appropriate pain scale- Administer analgesics based on type and severity of pain and evaluate response- Implement non-pharmacological measures as appropriate and evaluate response- Notify physician/advanced practitioner if interventions unsuccessful or patient reports new pain  Outcome: Progressing     Problem: INFECTION - ADULT  Goal: Absence or prevention of progression during hospitalization  Description: INTERVENTIONS:- Assess and monitor for signs and symptoms of infection- Monitor lab/diagnostic results- Monitor all insertion sites, i.e. indwelling lines, tubes, and drains- [place] appropriate cooling/warming therapies per order- Administer medications as ordered- Instruct and encourage patient and family to use good hand hygiene technique- Identify and instruct in appropriate isolation precautions for identified infection/condition  Outcome: Progressing     Problem: SAFETY ADULT  Goal: Patient will remain free of falls  Description: INTERVENTIONS:- Educate patient/family on patient safety including physical limitations- Instruct patient to call for assistance with activity - Consult OT/PT to assist with strengthening/mobility - Keep Call bell within reach- Keep bed low and locked with side rails adjusted as appropriate- Keep care items and personal belongings within reach- Initiate and maintain comfort rounds- Make Fall Risk Sign visible to staff- Offer Toileting every 2 Hours, in advance of need- Initiate/Maintain bed/chair alarm- Obtain necessary fall risk management equipment.- Apply yellow socks and bracelet for high fall risk patients- Consider moving patient to room near nurses station  INTERVENTIONS:- Educate patient/family on patient safety including physical limitations- Instruct patient to call  for assistance with activity - Consult OT/PT to assist with strengthening/mobility - Keep Call bell within reach- Keep bed low and locked with side rails adjusted as appropriate- Keep care items and personal belongings within reach- Initiate and maintain comfort rounds- Make Fall Risk Sign visible to staff- Offer Toileting every 2 Hours, in advance of need- Initiate/Maintain bed alarm- Obtain necessary fall risk management equipment: bracelet, socks, alarms- Apply yellow socks and bracelet for high fall risk patients- Consider moving patient to room near nurses station  Outcome: Progressing  Goal: Maintain or return to baseline ADL function  Description: INTERVENTIONS:-  Assess patient's ability to carry out ADLs; assess patient's baseline for ADL function and identify physical deficits which impact ability to perform ADLs (bathing, care of mouth/teeth, toileting, grooming, dressing, etc.)- Assess/evaluate cause of self-care deficits - Assess range of motion- Assess patient's mobility; develop plan if impaired- Assess patient's need for assistive devices and provide as appropriate- Encourage maximum independence but intervene and supervise when necessary- Involve family in performance of ADLs- Assess for home care needs following discharge - Consider OT consult to assist with ADL evaluation and planning for discharge- Provide patient education as appropriate  Outcome: Progressing     Problem: DISCHARGE PLANNING  Goal: Discharge to home or other facility with appropriate resources  Description: INTERVENTIONS:- Identify barriers to discharge w/patient and caregiver- Arrange for needed discharge resources and transportation as appropriate- Identify discharge learning needs (meds, wound care, etc.)- Refer to Case Management Department for coordinating discharge planning if the patient needs post-hospital services based on physician/advanced practitioner order or complex needs related to functional status, cognitive  ability, or social support system  Outcome: Progressing     Problem: Knowledge Deficit  Goal: Patient/family/caregiver demonstrates understanding of disease process, treatment plan, medications, and discharge instructions  Description: Complete learning assessment and assess knowledge base.Interventions:- Provide teaching at level of understanding- Provide teaching via preferred learning methods  Outcome: Progressing     Problem: NEUROSENSORY - ADULT  Goal: Achieves stable or improved neurological status  Description: INTERVENTIONS- Monitor and report changes in neurological status- Monitor vital signs such as temperature, blood pressure, glucose, and any other labs ordered - Initiate measures to prevent increased intracranial pressure- Monitor for seizure activity and implement precautions if appropriate    INTERVENTIONS- Monitor and report changes in neurological status- Monitor vital signs such as temperature, blood pressure, glucose, and any other labs ordered - Initiate measures to prevent increased intracranial pressure- Monitor for seizure activity and implement precautions if appropriate    Outcome: Progressing  Goal: Remains free of injury related to seizures activity  Description: INTERVENTIONS- Maintain airway, patient safety  and administer oxygen as ordered- Monitor patient for seizure activity, document and report duration and description of seizure to physician/advanced practitioner- If seizure occurs,  ensure patient safety during seizure- Reorient patient post seizure- Seizure pads on all 4 side rails- Instruct patient/family to notify RN of any seizure activity including if an aura is experienced- Instruct patient/family to call for assistance with activity based on nursing assessment- Administer anti-seizure medications if ordered  INTERVENTIONS- Maintain airway, patient safety  and administer oxygen as ordered- Monitor patient for seizure activity, document and report duration and description of  seizure to physician/advanced practitioner- If seizure occurs,  ensure patient safety during seizure- Reorient patient post seizure- Seizure pads on all 4 side rails- Instruct patient/family to notify RN of any seizure activity including if an aura is experienced- Instruct patient/family to call for assistance with activity based on nursing assessment- Administer anti-seizure medications if ordered  Outcome: Progressing  Goal: Achieves maximal functionality and self care  Description: INTERVENTIONS- Monitor swallowing and airway patency with patient fatigue and changes in neurological status- Encourage and assist patient to increase activity and self care. - Encourage visually impaired, hearing impaired and aphasic patients to use assistive/communication devices  INTERVENTIONS- Monitor swallowing and airway patency with patient fatigue and changes in neurological status- Encourage and assist patient to increase activity and self care. - Encourage visually impaired, hearing impaired and aphasic patients to use assistive/communication devices  Outcome: Progressing     Problem: METABOLIC, FLUID AND ELECTROLYTES - ADULT  Goal: Glucose maintained within target range  Description: INTERVENTIONS:- Monitor Blood Glucose as ordered- Assess for signs and symptoms of hyperglycemia and hypoglycemia- Administer ordered medications to maintain glucose within target range- Assess nutritional intake and initiate nutrition service referral as needed  INTERVENTIONS:- Monitor Blood Glucose as ordered- Assess for signs and symptoms of hyperglycemia and hypoglycemia- Administer ordered medications to maintain glucose within target range- Assess nutritional intake and initiate nutrition service referral as needed  Outcome: Progressing  Goal: Electrolytes maintained within normal limits  Description: INTERVENTIONS:- Monitor labs and assess patient for signs and symptoms of electrolyte imbalances- Administer electrolyte replacement as  ordered- Monitor response to electrolyte replacements, including repeat lab results as appropriate- Instruct patient on fluid and nutrition as appropriate  Outcome: Progressing  Goal: Fluid balance maintained  Description: INTERVENTIONS:- Monitor labs - Monitor I/O and WT- Instruct patient on fluid and nutrition as appropriate- Assess for signs & symptoms of volume excess or deficit  Outcome: Progressing     Problem: Prexisting or High Potential for Compromised Skin Integrity  Goal: Skin integrity is maintained or improved  Description: INTERVENTIONS:- Identify patients at risk for skin breakdown- Assess and monitor skin integrity- Assess and monitor nutrition and hydration status- Monitor labs - Assess for incontinence - Turn and reposition patient- Assist with mobility/ambulation- Relieve pressure over bony prominences- Avoid friction and shearing- Provide appropriate hygiene as needed including keeping skin clean and dry- Evaluate need for skin moisturizer/barrier cream- Collaborate with interdisciplinary team - Patient/family teaching- Consider wound care consult   Outcome: Progressing     Problem: SAFETY,RESTRAINT: NV/NON-SELF DESTRUCTIVE BEHAVIOR  Goal: Remains free of harm/injury (restraint for non violent/non self-detsructive behavior)  Description: INTERVENTIONS:- Instruct patient/family regarding restraint use - Assess and monitor physiologic and psychological status - Provide interventions and comfort measures to meet assessed patient needs - Identify and implement measures to help patient regain control- Assess readiness for release of restraint   Outcome: Progressing  Goal: Returns to optimal restraint-free functioning  Description: INTERVENTIONS:- Assess the patient's behavior and symptoms that indicate continued need for restraint- Identify and implement measures to help patient regain control- Assess readiness for release of restraint   Outcome: Progressing     Problem: CARDIOVASCULAR - ADULT  Goal:  Maintains optimal cardiac output and hemodynamic stability  Description: INTERVENTIONS:- Monitor I/O, vital signs and rhythm- Monitor for S/S and trends of decreased cardiac output- Administer and titrate ordered vasoactive medications to optimize hemodynamic stability- Assess quality of pulses, skin color and temperature- Assess for signs of decreased coronary artery perfusion- Instruct patient to report change in severity of symptoms  Outcome: Progressing  Goal: Absence of cardiac dysrhythmias or at baseline rhythm  Description: INTERVENTIONS:- Continuous cardiac monitoring, vital signs, obtain 12 lead EKG if ordered- Administer antiarrhythmic and heart rate control medications as ordered- Monitor electrolytes and administer replacement therapy as ordered  Outcome: Progressing     Problem: GASTROINTESTINAL - ADULT  Goal: Maintains or returns to baseline bowel function  Description: INTERVENTIONS:- Assess bowel function- Encourage oral fluids to ensure adequate hydration- Administer IV fluids if ordered to ensure adequate hydration- Administer ordered medications as needed- Encourage mobilization and activity- Consider nutritional services referral to assist patient with adequate nutrition and appropriate food choices  Outcome: Progressing  Goal: Maintains adequate nutritional intake  Description: INTERVENTIONS:- Monitor percentage of each meal consumed- Identify factors contributing to decreased intake, treat as appropriate- Assist with meals as needed- Monitor I&O, weight, and lab values if indicated- Obtain nutrition services referral as needed  Outcome: Progressing     Problem: GENITOURINARY - ADULT  Goal: Maintains or returns to baseline urinary function  Description: INTERVENTIONS:- Assess urinary function- Encourage oral fluids to ensure adequate hydration if ordered- Administer IV fluids as ordered to ensure adequate hydration- Administer ordered medications as needed- Offer frequent toileting- Follow  urinary retention protocol if ordered  Outcome: Progressing  Goal: Absence of urinary retention  Description: INTERVENTIONS:- Assess patient’s ability to void and empty bladder- Monitor I/O- Bladder scan as needed- Discuss with physician/AP medications to alleviate retention as needed- Discuss catheterization for long term situations as appropriate  Outcome: Progressing     Problem: SKIN/TISSUE INTEGRITY - ADULT  Goal: Skin Integrity remains intact(Skin Breakdown Prevention)  Description: Assess:-Perform [place] assessment every shift-Clean and moisturize skin every 2-Inspect skin when repositioning, toileting, and assisting with ADLS-Assess under medical devices such as restraints every 2-Assess extremities for adequate circulation and sensation Bed Management:-Have minimal linens on bed & keep smooth, unwrinkled-Change linens as needed when moist or perspiring-Avoid sitting or lying in one position for more than 2 hours while in bed-Keep HOB at 30degrees Toileting:-Offer bedside commode-Assess for incontinence every 2-Use incontinent care products after each incontinent episode such as proper cleaning equipment Activity:-Mobilize patient 3 times a day-Encourage activity and walks on unit-Encourage or provide ROM exercises -Turn and reposition patient every 2 Hours-Use appropriate equipment to lift or move patient in bed-Instruct/ Assist with weight shifting every 2 hours  when out of bed in chair-Consider limitation of chair time 2 hour intervalsSkin Care:-Avoid use of baby powder, tape, friction and shearing, hot water or constrictive clothing-Relieve pressure over bony prominences using 2-Do not massage red bony areasNext Steps:-Teach patient strategies to minimize risks such as pressure injuries -  Outcome: Progressing  Goal: Incision(s), wounds(s) or drain site(s) healing without S/S of infection  Description: INTERVENTIONS- Assess and document dressing, incision, wound bed, drain sites and surrounding tissue-  Provide patient and family education- Perform skin care/dressing changes as needed  Outcome: Progressing     Problem: HEMATOLOGIC - ADULT  Goal: Maintains hematologic stability  Description: INTERVENTIONS- Assess for signs and symptoms of bleeding or hemorrhage- Monitor labs- Administer supportive blood products/factors as ordered and appropriate  Outcome: Progressing     Problem: MUSCULOSKELETAL - ADULT  Goal: Maintain or return mobility to safest level of function  Description: INTERVENTIONS:- Assess patient's ability to carry out ADLs; assess patient's baseline for ADL function and identify physical deficits which impact ability to perform ADLs (bathing, care of mouth/teeth, toileting, grooming, dressing, etc.)- Assess/evaluate cause of self-care deficits - Assess range of motion- Assess patient's mobility- Assess patient's need for assistive devices and provide as appropriate- Encourage maximum independence but intervene and supervise when necessary- Involve family in performance of ADLs- Assess for home care needs following discharge - Consider OT consult to assist with ADL evaluation and planning for discharge- Provide patient education as appropriate  Outcome: Progressing     Problem: Nutrition/Hydration-ADULT  Goal: Nutrient/Hydration intake appropriate for improving, restoring or maintaining nutritional needs  Description: Monitor and assess patient's nutrition/hydration status for malnutrition. Collaborate with interdisciplinary team and initiate plan and interventions as ordered.  Monitor patient's weight and dietary intake as ordered or per policy. Utilize nutrition screening tool and intervene as necessary. Determine patient's food preferences and provide high-protein, high-caloric foods as appropriate. INTERVENTIONS:- Monitor oral intake, urinary output, labs, and treatment plans- Assess nutrition and hydration status and recommend course of action- Evaluate amount of meals eaten- Assist patient with  eating if necessary - Allow adequate time for meals- Recommend/ encourage appropriate diets, oral nutritional supplements, and vitamin/mineral supplements- Order, calculate, and assess calorie counts as needed- Recommend, monitor, and adjust tube feedings and TPN/PPN based on assessed needs- Assess need for intravenous fluids- Provide specific nutrition/hydration education as appropriate- Include patient/family/caregiver in decisions related to nutrition  Outcome: Progressing     Problem: COPING  Goal: Pt/Family able to verbalize concerns and demonstrate effective coping strategies  Description: INTERVENTIONS:- Assist patient/family to identify coping skills, available support systems and cultural and spiritual values- Provide emotional support, including active listening and acknowledgement of concerns of patient and caregivers- Reduce environmental stimuli, as able- Provide patient education- Assess for spiritual pain/suffering and initiate spiritual care, including notification of Pastoral Care or lucie based community as needed- Assess effectiveness of coping strategies  Outcome: Progressing  Goal: Will report anxiety at manageable levels  Description: INTERVENTIONS:- Administer medication as ordered- Teach and encourage coping skills- Provide emotional support- Assess patient/family for anxiety and ability to cope  Outcome: Progressing     Problem: Potential for Falls  Goal: Patient will remain free of falls  Description: INTERVENTIONS:- Educate patient/family on patient safety including physical limitations- Instruct patient to call for assistance with activity - Consult OT/PT to assist with strengthening/mobility - Keep Call bell within reach- Keep bed low and locked with side rails adjusted as appropriate- Keep care items and personal belongings within reach- Initiate and maintain comfort rounds- Make Fall Risk Sign visible to staff- Offer Toileting every 2 Hours, in advance of need- Initiate/Maintain  bed/chair alarm- Obtain necessary fall risk management equipment.- Apply yellow socks and bracelet for high fall risk patients- Consider moving patient to room near nurses station  INTERVENTIONS:- Educate patient/family on patient safety including physical limitations- Instruct patient to call for assistance with activity - Consult OT/PT to assist with strengthening/mobility - Keep Call bell within reach- Keep bed low and locked with side rails adjusted as appropriate- Keep care items and personal belongings within reach- Initiate and maintain comfort rounds- Make Fall Risk Sign visible to staff- Offer Toileting every 2 Hours, in advance of need- Initiate/Maintain bed alarm- Obtain necessary fall risk management equipment: bracelet, socks, alarms- Apply yellow socks and bracelet for high fall risk patients- Consider moving patient to room near nurses station  Outcome: Progressing

## 2025-05-05 NOTE — PLAN OF CARE
Problem: OCCUPATIONAL THERAPY ADULT  Goal: Performs self-care activities at highest level of function for planned discharge setting.  See evaluation for individualized goals.  Description: Treatment Interventions: ADL retraining, Functional transfer training, Visual perceptual retraining, Endurance training, Cognitive reorientation, Patient/family training, Equipment evaluation/education, Compensatory technique education, Activityengagement  Equipment Recommended:  (tbd)       See flowsheet documentation for full assessment, interventions and recommendations.   5/5/2025 [id number] by Carole Dubon OT  Note: Limitation: Decreased ADL status, Decreased Safe judgement during ADL, Decreased cognition, Decreased endurance, Decreased self-care trans, Decreased high-level ADLs  Prognosis: Fair  Assessment: Pt seen for skilled OT treatment session on this date w/ interventions focusing on ADL participation, transfer skills, and fxnl mobility. Pt was agreeable and willing to participate in session. Pt engaged in the following tasks: Supervision bed mobility, transers; CGA functional mobility; Min A UB dressing; Mod A LB dressing. In comparison to previous session, pt demonstrated improvements in functional mobility w/out AD. Pt required frequent re direction, verbal cues for safety, verbal cues for correct technique, cognitive assistance to anticipate next step, and one step directives. Pt continues to be functioning below baseline level as occupational performance remains limited by decreased ADL status, decreased activity tolerance, decreased endurance, decreased standing balance, decreased transfer skills, decreased fxnl mobility, decreased safety awareness, decreased insight into deficits, and cognition .  From OT standpoint, recommend Level I (Maximum Resource Intensity) at time of d/c. Pt will benefit from continued OT treatment while in acute care to address deficits as defined above and maximize level of functional  independence with ADLs and functional mobility. Pt left OOB in chair w/ alarm on and all needs within reach at end of session.  Recommendation: Physiatry Consult  Rehab Resource Intensity Level, OT: I (Maximum Resource Intensity)

## 2025-05-05 NOTE — PROGRESS NOTES
Progress Note - Infectious Disease   Name: Alexis Dennison 64 y.o. male I MRN: [id number]  Unit/Bed#: [facility] I Date of Admission: 3/29/2025   Date of Service: 5/5/2025 I Hospital Day: 37    Assessment & Plan  Sepsis (HCC)  Fever, HR.  Due to bacteremia, UTI.  No other appreciable source of infection.  ROS and exam otherwise benign..  Hemodynamically stable and nontoxic.  Improved.    Complete antibiotics today as below  Follow temperatures closely  Supportive care as per the primary service  E coli bacteremia  Due to UTI as below.  No other appreciable source.  LFTs stable.  CT A/P unremarkable.  Consider PICC infection, patinet self-removed.  Low suspicion for CNS infection given stable albeit fluctuating mental status.    Discontinue cefazolin today to completer 7 days total antibiotics  E. coli UTI  Pyuria, recent external catheter.  No evidence for retention.  Unreliable historian in terms of symptoms.    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  Complicated by obstructive hydrocephalus, status post EVD 4/13 through 4/19.  Status post Rituxan, high-dose methotrexate, and intrathecal cytarabine on 4/17.  Ongoing chemo.  LETY (acute kidney injury) (HCC)  Likely multifactorial.  Overall improving.  Encephalopathy  Multifactorial due to underlying CNS lymphoma, medications, sepsis and infection as above.  Stable.  Type 2 diabetes mellitus with hyperglycemia, without long-term current use of insulin ([facility])  Lab Results   Component Value Date    HGBA1C 6.6 (H) 02/22/2025   Risk factor for infection  Constipation  Risk factor for bacteremia.      The patient is stable from an ID standpoint.    Antibiotics:  Cefazolin #3  Antibiotics #7    Subjective   Patient sleeping and not awakened.  No events overnight.  No fevers or diarrhea noted.    Objective :  Temp:  [97.6 °F (36.4 °C)-98.3 °F (36.8 °C)] 97.9 °F (36.6 °C)  HR:  [60-69] 60  BP: (135-147)/(72-82)  143/76  Resp:  [15-17] 15  SpO2:  [96 %-100 %] 97 %  O2 Device: None (Room air)    General:  No acute distress  Psychiatric:  Sleeping   Pulmonary:  Normal respiratory excursion without accessory muscle use  Abdomen:  Soft, nontender  Extremities:  No edema  Skin:  No rashes      Lab Results: I have reviewed the following results:  Results from last 7 days   Lab Units 05/05/25  0439 05/03/25  1555 05/02/25  0544   WBC Thousand/uL 2.45* 3.01* 3.51*  3.51*   HEMOGLOBIN g/dL 9.9* 9.4* 10.1*  10.1*   PLATELETS Thousands/uL 191 146* 110*  110*     Results from last 7 days   Lab Units 05/05/25  0439 05/04/25  0533 05/03/25  0521 04/30/25  1304 04/29/25  0400   SODIUM mmol/L 145 141 140   < > 143   POTASSIUM mmol/L 4.1 3.8 3.7   < > 4.1   CHLORIDE mmol/L 104 102 103   < > 103   CO2 mmol/L 33* 33* 30   < > 28   BUN mg/dL 28* 24 30*   < > 46*   CREATININE mg/dL 1.36* 1.27 1.52*   < > 2.04*   EGFR ml/min/1.73sq m 54 58 47   < > 33   CALCIUM mg/dL 8.2* 7.8* 7.8*   < > 9.6   AST U/L 114*  --   --   --  15   ALT U/L 149*  --   --   --  62*   ALK PHOS U/L 54  --   --   --  64   ALBUMIN g/dL 3.0*  --   --   --  3.5    < > = values in this interval not displayed.     Results from last 7 days   Lab Units 04/29/25  1448 04/29/25  0341   BLOOD CULTURE   --  No Growth After 5 Days.  Escherichia coli*   GRAM STAIN RESULT   --  Gram negative rods*   URINE CULTURE  >100,000 cfu/ml Escherichia coli*  --      Results from last 7 days   Lab Units 04/29/25  0406   PROCALCITONIN ng/ml 3.93*

## 2025-05-05 NOTE — ASSESSMENT & PLAN NOTE
Due to UTI as below.  No other appreciable source.  LFTs stable.  CT A/P unremarkable.  Consider PICC infection, patinet self-removed.  Low suspicion for CNS infection given stable albeit fluctuating mental status.    Discontinue cefazolin today to completer 7 days total antibiotics

## 2025-05-06 PROBLEM — D72.819 LEUKOPENIA: Status: ACTIVE | Noted: 2025-05-06

## 2025-05-06 PROBLEM — Z91.89 ELECTROLYTE IMBALANCE RISK: Status: ACTIVE | Noted: 2025-05-06

## 2025-05-06 PROBLEM — Z91.89 AT RISK FOR ACID-BASE IMBALANCE: Status: ACTIVE | Noted: 2025-05-06

## 2025-05-06 LAB
ANION GAP SERPL CALCULATED.3IONS-SCNC: 5 MMOL/L (ref 4–13)
BACTERIA UR QL AUTO: ABNORMAL /HPF
BACTERIA UR QL AUTO: NORMAL /HPF
BILIRUB UR QL STRIP: NEGATIVE
BUN SERPL-MCNC: 36 MG/DL (ref 5–25)
CALCIUM SERPL-MCNC: 8.5 MG/DL (ref 8.4–10.2)
CHLORIDE SERPL-SCNC: 105 MMOL/L (ref 96–108)
CLARITY UR: CLEAR
CO2 SERPL-SCNC: 32 MMOL/L (ref 21–32)
COLOR UR: ABNORMAL
COLOR UR: ABNORMAL
COLOR UR: COLORLESS
CREAT SERPL-MCNC: 1.58 MG/DL (ref 0.6–1.3)
GFR SERPL CREATININE-BSD FRML MDRD: 45 ML/MIN/1.73SQ M
GLUCOSE SERPL-MCNC: 176 MG/DL (ref 65–140)
GLUCOSE SERPL-MCNC: 182 MG/DL (ref 65–140)
GLUCOSE SERPL-MCNC: 239 MG/DL (ref 65–140)
GLUCOSE SERPL-MCNC: 258 MG/DL (ref 65–140)
GLUCOSE SERPL-MCNC: 283 MG/DL (ref 65–140)
GLUCOSE UR STRIP-MCNC: ABNORMAL MG/DL
HGB UR QL STRIP.AUTO: ABNORMAL
HGB UR QL STRIP.AUTO: NEGATIVE
HGB UR QL STRIP.AUTO: NEGATIVE
KETONES UR STRIP-MCNC: NEGATIVE MG/DL
LEUKOCYTE ESTERASE UR QL STRIP: NEGATIVE
MTX SERPL-SCNC: 0.13 UMOL/L
MTX SERPL-SCNC: 0.36 UMOL/L
NITRITE UR QL STRIP: NEGATIVE
NON-SQ EPI CELLS URNS QL MICRO: ABNORMAL /HPF
NON-SQ EPI CELLS URNS QL MICRO: NORMAL /HPF
PH UR STRIP.AUTO: 7 [PH]
PH UR STRIP.AUTO: 7.5 [PH]
PH UR STRIP.AUTO: 8 [PH]
POTASSIUM SERPL-SCNC: 4.4 MMOL/L (ref 3.5–5.3)
PROT UR STRIP-MCNC: NEGATIVE MG/DL
RBC #/AREA URNS AUTO: ABNORMAL /HPF
RBC #/AREA URNS AUTO: NORMAL /HPF
SODIUM SERPL-SCNC: 142 MMOL/L (ref 135–147)
SP GR UR STRIP.AUTO: 1.01 (ref 1–1.03)
UROBILINOGEN UR STRIP-ACNC: <2 MG/DL
WBC #/AREA URNS AUTO: ABNORMAL /HPF
WBC #/AREA URNS AUTO: NORMAL /HPF

## 2025-05-06 PROCEDURE — 99232 SBSQ HOSP IP/OBS MODERATE 35: CPT | Performed by: INTERNAL MEDICINE

## 2025-05-06 PROCEDURE — 81001 URINALYSIS AUTO W/SCOPE: CPT | Performed by: INTERNAL MEDICINE

## 2025-05-06 PROCEDURE — 82948 REAGENT STRIP/BLOOD GLUCOSE: CPT

## 2025-05-06 PROCEDURE — 80048 BASIC METABOLIC PNL TOTAL CA: CPT | Performed by: INTERNAL MEDICINE

## 2025-05-06 PROCEDURE — 99232 SBSQ HOSP IP/OBS MODERATE 35: CPT

## 2025-05-06 PROCEDURE — 99233 SBSQ HOSP IP/OBS HIGH 50: CPT | Performed by: INTERNAL MEDICINE

## 2025-05-06 PROCEDURE — 80204 DRUG ASSAY METHOTREXATE: CPT | Performed by: INTERNAL MEDICINE

## 2025-05-06 PROCEDURE — G0545 PR INHERENT VISIT TO INPT: HCPCS | Performed by: INTERNAL MEDICINE

## 2025-05-06 RX ADMIN — DEXAMETHASONE 4 MG: 4 TABLET ORAL at 21:32

## 2025-05-06 RX ADMIN — INSULIN LISPRO 2 UNITS: 100 INJECTION, SOLUTION INTRAVENOUS; SUBCUTANEOUS at 12:28

## 2025-05-06 RX ADMIN — LEUCOVORIN CALCIUM 25 MG: 50 INJECTION, POWDER, LYOPHILIZED, FOR SOLUTION INTRAMUSCULAR; INTRAVENOUS at 13:15

## 2025-05-06 RX ADMIN — SODIUM BICARBONATE 100 ML/HR: 84 INJECTION, SOLUTION INTRAVENOUS at 13:23

## 2025-05-06 RX ADMIN — PANTOPRAZOLE SODIUM 40 MG: 40 TABLET, DELAYED RELEASE ORAL at 05:02

## 2025-05-06 RX ADMIN — SODIUM BICARBONATE 125 ML/HR: 84 INJECTION, SOLUTION INTRAVENOUS at 21:43

## 2025-05-06 RX ADMIN — HEPARIN SODIUM 5000 UNITS: 5000 INJECTION INTRAVENOUS; SUBCUTANEOUS at 14:34

## 2025-05-06 RX ADMIN — INSULIN LISPRO 6 UNITS: 100 INJECTION, SOLUTION INTRAVENOUS; SUBCUTANEOUS at 21:32

## 2025-05-06 RX ADMIN — DEXAMETHASONE 4 MG: 4 TABLET ORAL at 05:02

## 2025-05-06 RX ADMIN — LEUCOVORIN CALCIUM 25 MG: 50 INJECTION, POWDER, LYOPHILIZED, FOR SOLUTION INTRAMUSCULAR; INTRAVENOUS at 19:32

## 2025-05-06 RX ADMIN — INSULIN LISPRO 8 UNITS: 100 INJECTION, SOLUTION INTRAVENOUS; SUBCUTANEOUS at 12:28

## 2025-05-06 RX ADMIN — HEPARIN SODIUM 5000 UNITS: 5000 INJECTION INTRAVENOUS; SUBCUTANEOUS at 21:32

## 2025-05-06 RX ADMIN — INSULIN LISPRO 4 UNITS: 100 INJECTION, SOLUTION INTRAVENOUS; SUBCUTANEOUS at 08:37

## 2025-05-06 RX ADMIN — Medication 6 MG: at 19:12

## 2025-05-06 RX ADMIN — CHLORHEXIDINE GLUCONATE 0.12% ORAL RINSE 15 ML: 1.2 LIQUID ORAL at 21:32

## 2025-05-06 RX ADMIN — QUETIAPINE FUMARATE 12.5 MG: 25 TABLET ORAL at 12:28

## 2025-05-06 RX ADMIN — INSULIN LISPRO 8 UNITS: 100 INJECTION, SOLUTION INTRAVENOUS; SUBCUTANEOUS at 16:49

## 2025-05-06 RX ADMIN — INSULIN LISPRO 6 UNITS: 100 INJECTION, SOLUTION INTRAVENOUS; SUBCUTANEOUS at 16:48

## 2025-05-06 RX ADMIN — ATORVASTATIN CALCIUM 20 MG: 20 TABLET, FILM COATED ORAL at 08:37

## 2025-05-06 RX ADMIN — HEPARIN SODIUM 5000 UNITS: 5000 INJECTION INTRAVENOUS; SUBCUTANEOUS at 05:02

## 2025-05-06 RX ADMIN — SODIUM BICARBONATE 125 ML/HR: 84 INJECTION, SOLUTION INTRAVENOUS at 23:01

## 2025-05-06 RX ADMIN — CHLORHEXIDINE GLUCONATE 0.12% ORAL RINSE 15 ML: 1.2 LIQUID ORAL at 08:37

## 2025-05-06 RX ADMIN — ALLOPURINOL 300 MG: 300 TABLET ORAL at 08:37

## 2025-05-06 RX ADMIN — QUETIAPINE FUMARATE 50 MG: 25 TABLET ORAL at 21:32

## 2025-05-06 RX ADMIN — INSULIN GLARGINE 10 UNITS: 100 INJECTION, SOLUTION SUBCUTANEOUS at 21:32

## 2025-05-06 RX ADMIN — INSULIN LISPRO 8 UNITS: 100 INJECTION, SOLUTION INTRAVENOUS; SUBCUTANEOUS at 08:37

## 2025-05-06 RX ADMIN — DEXAMETHASONE 4 MG: 4 TABLET ORAL at 14:34

## 2025-05-06 NOTE — CASE MANAGEMENT
Case Management Discharge Planning Note    Patient name Alexis Dennison  Location [facility] 904/[facility] 904-01 MRN [id number]  : 1960 Date 2025       Current Admission Date: 3/29/2025  Current Admission Diagnosis:Primary CNS lymphoma   Patient Active Problem List    Diagnosis Date Noted Date Diagnosed    Leukopenia 2025     E. coli UTI 2025     Sepsis (HCC) 2025     E coli bacteremia 2025     Metabolic alkalosis 2025     LETY (acute kidney injury) (HCC) 2025     Goals of care, counseling/discussion 2025     Palliative care encounter 2025     Primary CNS lymphoma 2025     Encephalopathy 2025     Constipation 2025     Ambulatory dysfunction 2025     Thyroid nodule 2025     Pulmonary nodule 2025     Liver lesion 2025     Primary central nervous system (CNS) lymphoma 2025     Type 2 diabetes mellitus with hyperglycemia, without long-term current use of insulin (HCC) 2022     HTN, goal below 130/80 2022     Mixed hyperlipidemia 2022     Vitamin D deficiency 2022     NAFLD (nonalcoholic fatty liver disease) 2022       LOS (days): 38  Geometric Mean LOS (GMLOS) (days): 11.1  Days to GMLOS:-26.4     OBJECTIVE:  Risk of Unplanned Readmission Score: 41.05         Current admission status: Inpatient   Preferred Pharmacy:   [facility]pharm[facility] #[id number] - [place] - [address] [address]  [address] [address]  Phone: [phone] Fax: [phone]    [facility] - [place], [place] - [address]  [address]  [facility] [address]  Phone: [phone] Fax: [phone]    [facility] - [place], [place] - [address]  [email or web address]  [address]  [place] [address]  Phone: [phone] Fax: [phone]    [facility] [place] - [address]  [address]  [facility] [address]  Phone: [phone] Fax: [phone]    [facility]) #[id number] [address] [address]  Phone: [phone] Fax: [phone]    Primary Care Provider: PATI Mckeon    Primary Insurance: BLUE CROSS  Secondary Insurance: CAPITAL    DISCHARGE DETAILS:                 Other Referral/Resources/Interventions Provided:  Referral Comments: Message placed to [facility] to inqurie when bed is available.       3042 Message back from [facility] & expect bed to be open late tomorrow. TC to daughter Elizabeth [phone] & updated her. Confirmed dc plan is STR at [facility].  Will let her know when pt is going to be transferred.  SLIM updated.

## 2025-05-06 NOTE — PROGRESS NOTES
"Progress Note - Hospitalist   Name: Alexis Dennison 64 y.o. male I MRN: [id number]  Unit/Bed#: [facility] 904-01 I Date of Admission: 3/29/2025   Date of Service: 5/6/2025 I Hospital Day: 38    Assessment & Plan  Primary CNS lymphoma  Patient with development of worsening confusion, recently seen at the [facility] ED on 3/24/2025 with CT head showing brain lesion   MRI of the brain 3/26/2025 concerning for \"multiple scattered areas of subependymoma nodular enhancement throughout ventricles with mild hydrocephalus left worse than right, scattered nodular areas of enhancement in left anterior inferior basal ganglia involving left anterior commissure, and smaller foci of nodular enhancement along anteromedial aspect of the left cerebral peduncle and left quirino.\"  With brain compression  (minimal left to right shift), mild hydrocephalus as evidenced by initial imaging  S/p LP on 3/31. 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  Lymphoma/leukemia panel nondiagnostic  CT head on 4/3 with interval increase in ventricular caliber concerning for worsening hydrocephalus. Repeat LP on 4/7 was again undiagnostic.   MRI of the brain from 4/11-\"Interval enlargement of the dominant left anterior basal ganglionic mass lesion with greater surrounding vasogenic edema and mass effect. 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  S/p EVD, self removed over 4/26 weekend  Completed keppra 500mg BID x 7 days for postoperative seizure ppx per neurosurgery    Plan:  Patient started on chemotherapy while inpatient.  Discussed with oncology and plan for rituximab infusion weekly and methotrexate every 2 weeks.  Patient will require daily serum methotrexate levels until less than 0.1.  Also recommend dexamethasone taper as follows:4 mg Q8H through 5/5, then 4 mg BID x4 days, then 2 mg BID x4 " days, then 1 mg BID x4 days, then 1 mg daily x4 days, then discontinue.   Maintain PICC line  Nephrology & heme-onc monitoring urine pH for urine alkalinization with intermittent bicarb drip   Patient medically stable for rehab.  Insurance Auth obtained, awaiting bed  Encephalopathy  Improving  Multifactorial in the setting of brain mass, vasogenic edema, medication induced and hospital-acquired delirium    Plan:  Continue Seroquel 12.5 mg AM/25 mg qHS  On steroid taper as above  Continue delirium precautions  Patient on 1:1 for safety  Sepsis (HCC)  Resolved   As evidenced on 4/29 morning with temperature and tachycardia.  Worsening kidney function.  Lactate within normal limits  S/p 1 L bolus, continue IV fluids  Elevated procalcitonin in the setting of recent rituximab  Secondary to UTI, bacteremia  UA with innumerable bacteria and large leukocytes  Urine culture with E. coli  Blood culture with 1 out of 2 with E. coli  S/p 7 days IV cefazolin ended 5/5  E coli bacteremia  Blood cultures with 1 out of 2 with E. Coli  CT abdomen pelvis on 4/30 with no acute pathology  Status post 7 days IV cefazolin that ended 5/5  E. coli UTI  As above  Constipation  Abdomen x-ray on 4/29 with large colonic stool burden without obstruction  CT abdomen pelvis on 4/30 with constipation  S/p enema and suppository  Resolved  Plan:  Continue bowel regimen  Ambulate patient as able  LETY (acute kidney injury) (HCC)  Lab Results   Component Value Date    CREATININE 1.58 (H) 05/06/2025    CREATININE 1.36 (H) 05/05/2025    CREATININE 1.27 05/04/2025     Baseline creatinine around 0.9, peak creatinine 2.7  Patient was evaluated by nephrology during this hospital stay and given improvement of creatinine with IV fluids signed off on 4/25.  Reconsulted on 5/2 for monitoring post methotrexate  Creatinine has remained stable  Continue to monitor BMP.  Avoid nephrotoxins  Ambulatory dysfunction  Plan for acute rehab - remains stable   Fall  precautions  Ambulate patient TID as tolerated  Type 2 diabetes mellitus with hyperglycemia, without long-term current use of insulin (HCC)  Hemoglobin A1c 6.6% on 2/22/2025    Recent Labs     05/06/25  0500 05/06/25  0824 05/06/25  1113 05/06/25  1541   POCGLU  --  239* 182* 283*   GLUC 176*  --   --   --        PTA metformin 1000 mg daily, holding  On Lantus 10 units at bedtime   Accu-Cheks reviewed and prandial glucose above goal  Hyperglycemic secondary to steroid use  Increase lispro to 8 units 3 times daily, insulin sliding scale  Hypoglycemia protocol  Mixed hyperlipidemia  Continue statin  HTN, goal below 130/80  PTA losartan and hydrochlorothiazide, held on admission  BP reviewed and acceptable without medications  Liver lesion  MRI obtained: No suspicious hepatic lesions.  There is a simple right hepatic lobe cyst.  Mild diffuse hepatic steatosis.  LFTs stable  Outpatient follow up advised  Thyroid nodule  Incidental findings on CT scan  Lab Results   Component Value Date    [id number] 0.019 (L) 04/21/2025    FREET4 1.13 (H) 04/21/2025       Nonemergent outpatient endocrine follow-up.  No need for ultrasound given patient has hyperthyroid appearance of recent labs.  Will require outpatient confirmation of TSH, free T4, plus minus further imaging with endocrine such as radioactive uptake  Pulmonary nodule  Imaging with 4 mm right lower lobe pulmonary nodule.  CT chest 3-month follow-up    VTE Pharmacologic Prophylaxis: VTE Score: 5 High Risk (Score >/= 5) - Pharmacological DVT Prophylaxis Ordered: heparin. Sequential Compression Devices Ordered.    Mobility:   Basic Mobility Inpatient Raw Score: 17  JH-HLM Goal: 5: Stand one or more mins  JH-HLM Achieved: 8: Walk 250 feet ot more  JH-HLM Goal achieved. Continue to encourage appropriate mobility.    Patient Centered Rounds: I performed bedside rounds with nursing staff today.   Discussions with Specialists or Other Care Team Provider: Nephrology,  heme-onc    Education and Discussions with Family / Patient: Patient declined call to .  Plan to call family once closer to discharge - may be d/c as early as 5/7 to acute rehab     Current Length of Stay: 38 day(s)  Current Patient Status: Inpatient   Certification Statement: The patient will continue to require additional inpatient hospital stay due to medically stable.  Pending placement to rehab.  Ongoing treatments with urine alkalinization with bicarb drip as per nephro, heme-onc regarding treatment of B-cell lymphoma  Discharge Plan: Anticipate discharge later today or tomorrow to rehab facility.    Code Status: Level 1 - Full Code    Subjective   Patient offers no complaints today.  He was asking about his heart rate, though this was within normal limits at time of exam.  He denies fevers, chills, chest pain, shortness of breath, nausea, vomiting.    Objective :  Temp:  [97.6 °F (36.4 °C)-98.5 °F (36.9 °C)] 97.6 °F (36.4 °C)  HR:  [56-82] 56  BP: (123-140)/(71-86) 140/71  Resp:  [16-18] 18  SpO2:  [98 %-99 %] 98 %  O2 Device: None (Room air)    Body mass index is 23.88 kg/m².     Input and Output Summary (last 24 hours):     Intake/Output Summary (Last 24 hours) at 5/6/2025 0821  Last data filed at 5/6/2025 0400  Gross per 24 hour   Intake 220 ml   Output 3000 ml   Net -2780 ml       Physical Exam  Vitals reviewed.   Constitutional:       General: He is not in acute distress.  HENT:      Head: Normocephalic and atraumatic.      Mouth/Throat:      Mouth: Mucous membranes are moist.      Pharynx: Oropharynx is clear.   Eyes:      General: No scleral icterus.     Extraocular Movements: Extraocular movements intact.   Cardiovascular:      Rate and Rhythm: Normal rate and regular rhythm.      Heart sounds: No murmur heard.     No friction rub. No gallop.   Pulmonary:      Effort: Pulmonary effort is normal. No respiratory distress.      Breath sounds: No stridor. No wheezing or rales.   Abdominal:       General: There is no distension.      Palpations: There is no mass.      Tenderness: There is no abdominal tenderness. There is no guarding.   Musculoskeletal:         General: Normal range of motion.   Skin:     General: Skin is warm and dry.      Findings: No rash.   Neurological:      Mental Status: He is alert.      Comments: Calm, cooperative. No longer encephalopathic. Able to respond to ROS appropriately.    Psychiatric:         Mood and Affect: Mood normal.         Behavior: Behavior normal.         Thought Content: Thought content normal.           Lines/Drains:  Lines/Drains/Airways       Active Status       Name Placement date Placement time Site Days    PICC Line 05/01/25 Left Brachial 05/01/25  1210  Brachial  4    External Urinary Catheter 05/03/25  0206  -- 3                    Central Line:  Goal for removal: N/A - Chronic PICC               Lab Results: I have reviewed the following results:   Results from last 7 days   Lab Units 05/05/25  0439   WBC Thousand/uL 2.45*   HEMOGLOBIN g/dL 9.9*   HEMATOCRIT % 28.8*   PLATELETS Thousands/uL 191   SEGS PCT % 80*   LYMPHO PCT % 15   MONO PCT % 4   EOS PCT % 0     Results from last 7 days   Lab Units 05/06/25  0500 05/05/25  0439   SODIUM mmol/L 142 145   POTASSIUM mmol/L 4.4 4.1   CHLORIDE mmol/L 105 104   CO2 mmol/L 32 33*   BUN mg/dL 36* 28*   CREATININE mg/dL 1.58* 1.36*   ANION GAP mmol/L 5 8   CALCIUM mg/dL 8.5 8.2*   ALBUMIN g/dL  --  3.0*   TOTAL BILIRUBIN mg/dL  --  0.50   ALK PHOS U/L  --  54   ALT U/L  --  149*   AST U/L  --  114*   GLUCOSE RANDOM mg/dL 176* 155*         Results from last 7 days   Lab Units 05/05/25  2017 05/05/25  1620 05/05/25  1105 05/05/25  0713 05/04/25  2115 05/04/25  1642 05/04/25  1126 05/04/25  0731 05/03/25  2007 05/03/25  1559 05/03/25  1108 05/03/25  0730   POC GLUCOSE mg/dl 224* 308* 250* 123 208* 213* 245* 190* 288* 219* 244* 189*               Recent Cultures (last 7 days):   Results from last 7 days   Lab  Units 04/29/25  1448   URINE CULTURE  >100,000 cfu/ml Escherichia coli*       Imaging Results Review: No pertinent imaging studies reviewed.  Other Study Results Review: No additional pertinent studies reviewed.    Last 24 Hours Medication List:     Current Facility-Administered Medications:     acetaminophen (TYLENOL) tablet 650 mg, Q6H PRN    allopurinol (ZYLOPRIM) tablet 300 mg, Daily    alteplase (CATHFLO) injection 2 mg, Q1MIN PRN    aluminum-magnesium hydroxide-simethicone (MAALOX) oral suspension 30 mL, Q4H PRN    atorvastatin (LIPITOR) tablet 20 mg, Daily    bisacodyl (DULCOLAX) rectal suppository 10 mg, Daily    calcium carbonate (TUMS) chewable tablet 1,000 mg, Daily PRN    chlorhexidine (PERIDEX) 0.12 % oral rinse 15 mL, Q12H CAIT    dexamethasone (DECADRON) tablet 4 mg, Q8H CAIT    heparin (porcine) subcutaneous injection 5,000 Units, Q8H CAIT    insulin glargine (LANTUS) subcutaneous injection 10 Units 0.1 mL, HS    insulin lispro (HumALOG/ADMELOG) 100 units/mL subcutaneous injection 2-12 Units, 4x Daily (AC & HS) **AND** Fingerstick Glucose (POCT), 4x Daily AC and at bedtime    insulin lispro (HumALOG/ADMELOG) 100 units/mL subcutaneous injection 8 Units, TID With Meals    melatonin tablet 6 mg, HS    OLANZapine (ZyPREXA) IM injection 5 mg, BID PRN    ondansetron (ZOFRAN) injection 4 mg, Q6H PRN    oxyCODONE (ROXICODONE) split tablet 2.5 mg, Q4H PRN **OR** oxyCODONE (ROXICODONE) IR tablet 5 mg, Q4H PRN    pantoprazole (PROTONIX) EC tablet 40 mg, Early Morning    QUEtiapine (SEROquel) tablet 12.5 mg, Daily    QUEtiapine (SEROquel) tablet 50 mg, HS    senna (SENOKOT) tablet 17.2 mg, HS    sodium chloride 0.9 % infusion, Once PRN    Administrative Statements   Today, Patient Was Seen By: Sandee Fuentes MD  I have spent a total time of 45 minutes in caring for this patient on the day of the visit/encounter including Diagnostic results, Prognosis, Risks and benefits of tx options, Instructions for  management, Patient and family education, Importance of tx compliance, Risk factor reductions, Impressions, Counseling / Coordination of care, Documenting in the medical record, Reviewing/placing orders in the medical record (including tests, medications, and/or procedures), Obtaining or reviewing history  , and Communicating with other healthcare professionals .    **Please Note: This note may have been constructed using a voice recognition system.**

## 2025-05-06 NOTE — ASSESSMENT & PLAN NOTE
Diagnosed by LP, brain biopsy.  Complicated by obstructive hydrocephalus, status post EVD 4/13 through 4/19.  Status post Rituxan, high-dose methotrexate, and intrathecal cytarabine on 4/17.  Ongoing chemo.

## 2025-05-06 NOTE — ASSESSMENT & PLAN NOTE
Pyuria, recent external catheter.  No evidence for retention.  Unreliable historian in terms of symptoms.  Status post 7 days IV antibiotics    Continue to follow closely off antibiotics  Follow UOP closely

## 2025-05-06 NOTE — ASSESSMENT & PLAN NOTE
Improving  Multifactorial in the setting of brain mass, vasogenic edema, medication induced and hospital-acquired delirium    Plan:  Continue Seroquel 12.5 mg AM/25 mg qHS  On steroid taper as above  Continue delirium precautions  Patient on 1:1 for safety

## 2025-05-06 NOTE — ASSESSMENT & PLAN NOTE
Abdomen x-ray on 4/29 with large colonic stool burden without obstruction  CT abdomen pelvis on 4/30 with constipation  S/p enema and suppository  Resolved  Plan:  Continue bowel regimen  Ambulate patient as able

## 2025-05-06 NOTE — ASSESSMENT & PLAN NOTE
Lab Results   Component Value Date    CREATININE 1.58 (H) 05/06/2025    CREATININE 1.36 (H) 05/05/2025    CREATININE 1.27 05/04/2025     Baseline creatinine around 0.9, peak creatinine 2.7  Patient was evaluated by nephrology during this hospital stay and given improvement of creatinine with IV fluids signed off on 4/25.  Reconsulted on 5/2 for monitoring post methotrexate  Creatinine has remained stable  Continue to monitor BMP.  Avoid nephrotoxins

## 2025-05-06 NOTE — PLAN OF CARE
Problem: PAIN - ADULT  Goal: Verbalizes/displays adequate comfort level or baseline comfort level  Description: Interventions:- Encourage patient to monitor pain and request assistance- Assess pain using appropriate pain scale- Administer analgesics based on type and severity of pain and evaluate response- Implement non-pharmacological measures as appropriate and evaluate response- Notify physician/advanced practitioner if interventions unsuccessful or patient reports new pain  Outcome: Progressing     Problem: INFECTION - ADULT  Goal: Absence or prevention of progression during hospitalization  Description: INTERVENTIONS:- Assess and monitor for signs and symptoms of infection- Monitor lab/diagnostic results- Monitor all insertion sites, i.e. indwelling lines, tubes, and drains- [place] appropriate cooling/warming therapies per order- Administer medications as ordered- Instruct and encourage patient and family to use good hand hygiene technique- Identify and instruct in appropriate isolation precautions for identified infection/condition  Outcome: Progressing     Problem: SAFETY ADULT  Goal: Patient will remain free of falls  Description: INTERVENTIONS:- Educate patient/family on patient safety including physical limitations- Instruct patient to call for assistance with activity - Consult OT/PT to assist with strengthening/mobility - Keep Call bell within reach- Keep bed low and locked with side rails adjusted as appropriate- Keep care items and personal belongings within reach- Initiate and maintain comfort rounds- Make Fall Risk Sign visible to staff- Offer Toileting every 2 Hours, in advance of need- Initiate/Maintain bed/chair alarm- Obtain necessary fall risk management equipment.- Apply yellow socks and bracelet for high fall risk patients- Consider moving patient to room near nurses station  INTERVENTIONS:- Educate patient/family on patient safety including physical limitations- Instruct patient to call  for assistance with activity - Consult OT/PT to assist with strengthening/mobility - Keep Call bell within reach- Keep bed low and locked with side rails adjusted as appropriate- Keep care items and personal belongings within reach- Initiate and maintain comfort rounds- Make Fall Risk Sign visible to staff- Offer Toileting every 2 Hours, in advance of need- Initiate/Maintain bed alarm- Obtain necessary fall risk management equipment: bracelet, socks, alarms- Apply yellow socks and bracelet for high fall risk patients- Consider moving patient to room near nurses station  Outcome: Progressing  Goal: Maintain or return to baseline ADL function  Description: INTERVENTIONS:-  Assess patient's ability to carry out ADLs; assess patient's baseline for ADL function and identify physical deficits which impact ability to perform ADLs (bathing, care of mouth/teeth, toileting, grooming, dressing, etc.)- Assess/evaluate cause of self-care deficits - Assess range of motion- Assess patient's mobility; develop plan if impaired- Assess patient's need for assistive devices and provide as appropriate- Encourage maximum independence but intervene and supervise when necessary- Involve family in performance of ADLs- Assess for home care needs following discharge - Consider OT consult to assist with ADL evaluation and planning for discharge- Provide patient education as appropriate  Outcome: Progressing     Problem: DISCHARGE PLANNING  Goal: Discharge to home or other facility with appropriate resources  Description: INTERVENTIONS:- Identify barriers to discharge w/patient and caregiver- Arrange for needed discharge resources and transportation as appropriate- Identify discharge learning needs (meds, wound care, etc.)- Refer to Case Management Department for coordinating discharge planning if the patient needs post-hospital services based on physician/advanced practitioner order or complex needs related to functional status, cognitive  ability, or social support system  Outcome: Progressing     Problem: Knowledge Deficit  Goal: Patient/family/caregiver demonstrates understanding of disease process, treatment plan, medications, and discharge instructions  Description: Complete learning assessment and assess knowledge base.Interventions:- Provide teaching at level of understanding- Provide teaching via preferred learning methods  Outcome: Progressing     Problem: NEUROSENSORY - ADULT  Goal: Achieves stable or improved neurological status  Description: INTERVENTIONS- Monitor and report changes in neurological status- Monitor vital signs such as temperature, blood pressure, glucose, and any other labs ordered - Initiate measures to prevent increased intracranial pressure- Monitor for seizure activity and implement precautions if appropriate    INTERVENTIONS- Monitor and report changes in neurological status- Monitor vital signs such as temperature, blood pressure, glucose, and any other labs ordered - Initiate measures to prevent increased intracranial pressure- Monitor for seizure activity and implement precautions if appropriate    Outcome: Progressing  Goal: Remains free of injury related to seizures activity  Description: INTERVENTIONS- Maintain airway, patient safety  and administer oxygen as ordered- Monitor patient for seizure activity, document and report duration and description of seizure to physician/advanced practitioner- If seizure occurs,  ensure patient safety during seizure- Reorient patient post seizure- Seizure pads on all 4 side rails- Instruct patient/family to notify RN of any seizure activity including if an aura is experienced- Instruct patient/family to call for assistance with activity based on nursing assessment- Administer anti-seizure medications if ordered  INTERVENTIONS- Maintain airway, patient safety  and administer oxygen as ordered- Monitor patient for seizure activity, document and report duration and description of  seizure to physician/advanced practitioner- If seizure occurs,  ensure patient safety during seizure- Reorient patient post seizure- Seizure pads on all 4 side rails- Instruct patient/family to notify RN of any seizure activity including if an aura is experienced- Instruct patient/family to call for assistance with activity based on nursing assessment- Administer anti-seizure medications if ordered  Outcome: Progressing  Goal: Achieves maximal functionality and self care  Description: INTERVENTIONS- Monitor swallowing and airway patency with patient fatigue and changes in neurological status- Encourage and assist patient to increase activity and self care. - Encourage visually impaired, hearing impaired and aphasic patients to use assistive/communication devices  INTERVENTIONS- Monitor swallowing and airway patency with patient fatigue and changes in neurological status- Encourage and assist patient to increase activity and self care. - Encourage visually impaired, hearing impaired and aphasic patients to use assistive/communication devices  Outcome: Progressing     Problem: METABOLIC, FLUID AND ELECTROLYTES - ADULT  Goal: Glucose maintained within target range  Description: INTERVENTIONS:- Monitor Blood Glucose as ordered- Assess for signs and symptoms of hyperglycemia and hypoglycemia- Administer ordered medications to maintain glucose within target range- Assess nutritional intake and initiate nutrition service referral as needed  INTERVENTIONS:- Monitor Blood Glucose as ordered- Assess for signs and symptoms of hyperglycemia and hypoglycemia- Administer ordered medications to maintain glucose within target range- Assess nutritional intake and initiate nutrition service referral as needed  Outcome: Progressing  Goal: Electrolytes maintained within normal limits  Description: INTERVENTIONS:- Monitor labs and assess patient for signs and symptoms of electrolyte imbalances- Administer electrolyte replacement as  ordered- Monitor response to electrolyte replacements, including repeat lab results as appropriate- Instruct patient on fluid and nutrition as appropriate  Outcome: Progressing  Goal: Fluid balance maintained  Description: INTERVENTIONS:- Monitor labs - Monitor I/O and WT- Instruct patient on fluid and nutrition as appropriate- Assess for signs & symptoms of volume excess or deficit  Outcome: Progressing     Problem: Prexisting or High Potential for Compromised Skin Integrity  Goal: Skin integrity is maintained or improved  Description: INTERVENTIONS:- Identify patients at risk for skin breakdown- Assess and monitor skin integrity- Assess and monitor nutrition and hydration status- Monitor labs - Assess for incontinence - Turn and reposition patient- Assist with mobility/ambulation- Relieve pressure over bony prominences- Avoid friction and shearing- Provide appropriate hygiene as needed including keeping skin clean and dry- Evaluate need for skin moisturizer/barrier cream- Collaborate with interdisciplinary team - Patient/family teaching- Consider wound care consult   Outcome: Progressing     Problem: SAFETY,RESTRAINT: NV/NON-SELF DESTRUCTIVE BEHAVIOR  Goal: Remains free of harm/injury (restraint for non violent/non self-detsructive behavior)  Description: INTERVENTIONS:- Instruct patient/family regarding restraint use - Assess and monitor physiologic and psychological status - Provide interventions and comfort measures to meet assessed patient needs - Identify and implement measures to help patient regain control- Assess readiness for release of restraint   Outcome: Progressing  Goal: Returns to optimal restraint-free functioning  Description: INTERVENTIONS:- Assess the patient's behavior and symptoms that indicate continued need for restraint- Identify and implement measures to help patient regain control- Assess readiness for release of restraint   Outcome: Progressing     Problem: CARDIOVASCULAR - ADULT  Goal:  Maintains optimal cardiac output and hemodynamic stability  Description: INTERVENTIONS:- Monitor I/O, vital signs and rhythm- Monitor for S/S and trends of decreased cardiac output- Administer and titrate ordered vasoactive medications to optimize hemodynamic stability- Assess quality of pulses, skin color and temperature- Assess for signs of decreased coronary artery perfusion- Instruct patient to report change in severity of symptoms  Outcome: Progressing  Goal: Absence of cardiac dysrhythmias or at baseline rhythm  Description: INTERVENTIONS:- Continuous cardiac monitoring, vital signs, obtain 12 lead EKG if ordered- Administer antiarrhythmic and heart rate control medications as ordered- Monitor electrolytes and administer replacement therapy as ordered  Outcome: Progressing     Problem: GASTROINTESTINAL - ADULT  Goal: Maintains or returns to baseline bowel function  Description: INTERVENTIONS:- Assess bowel function- Encourage oral fluids to ensure adequate hydration- Administer IV fluids if ordered to ensure adequate hydration- Administer ordered medications as needed- Encourage mobilization and activity- Consider nutritional services referral to assist patient with adequate nutrition and appropriate food choices  Outcome: Progressing  Goal: Maintains adequate nutritional intake  Description: INTERVENTIONS:- Monitor percentage of each meal consumed- Identify factors contributing to decreased intake, treat as appropriate- Assist with meals as needed- Monitor I&O, weight, and lab values if indicated- Obtain nutrition services referral as needed  Outcome: Progressing     Problem: GENITOURINARY - ADULT  Goal: Maintains or returns to baseline urinary function  Description: INTERVENTIONS:- Assess urinary function- Encourage oral fluids to ensure adequate hydration if ordered- Administer IV fluids as ordered to ensure adequate hydration- Administer ordered medications as needed- Offer frequent toileting- Follow  urinary retention protocol if ordered  Outcome: Progressing  Goal: Absence of urinary retention  Description: INTERVENTIONS:- Assess patient’s ability to void and empty bladder- Monitor I/O- Bladder scan as needed- Discuss with physician/AP medications to alleviate retention as needed- Discuss catheterization for long term situations as appropriate  Outcome: Progressing     Problem: SKIN/TISSUE INTEGRITY - ADULT  Goal: Skin Integrity remains intact(Skin Breakdown Prevention)  Description: Assess:-Perform [place] assessment every shift-Clean and moisturize skin every 2-Inspect skin when repositioning, toileting, and assisting with ADLS-Assess under medical devices such as restraints every 2-Assess extremities for adequate circulation and sensation Bed Management:-Have minimal linens on bed & keep smooth, unwrinkled-Change linens as needed when moist or perspiring-Avoid sitting or lying in one position for more than 2 hours while in bed-Keep HOB at 30degrees Toileting:-Offer bedside commode-Assess for incontinence every 2-Use incontinent care products after each incontinent episode such as proper cleaning equipment Activity:-Mobilize patient 3 times a day-Encourage activity and walks on unit-Encourage or provide ROM exercises -Turn and reposition patient every 2 Hours-Use appropriate equipment to lift or move patient in bed-Instruct/ Assist with weight shifting every 2 hours  when out of bed in chair-Consider limitation of chair time 2 hour intervalsSkin Care:-Avoid use of baby powder, tape, friction and shearing, hot water or constrictive clothing-Relieve pressure over bony prominences using 2-Do not massage red bony areasNext Steps:-Teach patient strategies to minimize risks such as pressure injuries -  Outcome: Progressing  Goal: Incision(s), wounds(s) or drain site(s) healing without S/S of infection  Description: INTERVENTIONS- Assess and document dressing, incision, wound bed, drain sites and surrounding tissue-  Provide patient and family education- Perform skin care/dressing changes as needed  Outcome: Progressing     Problem: HEMATOLOGIC - ADULT  Goal: Maintains hematologic stability  Description: INTERVENTIONS- Assess for signs and symptoms of bleeding or hemorrhage- Monitor labs- Administer supportive blood products/factors as ordered and appropriate  Outcome: Progressing     Problem: MUSCULOSKELETAL - ADULT  Goal: Maintain or return mobility to safest level of function  Description: INTERVENTIONS:- Assess patient's ability to carry out ADLs; assess patient's baseline for ADL function and identify physical deficits which impact ability to perform ADLs (bathing, care of mouth/teeth, toileting, grooming, dressing, etc.)- Assess/evaluate cause of self-care deficits - Assess range of motion- Assess patient's mobility- Assess patient's need for assistive devices and provide as appropriate- Encourage maximum independence but intervene and supervise when necessary- Involve family in performance of ADLs- Assess for home care needs following discharge - Consider OT consult to assist with ADL evaluation and planning for discharge- Provide patient education as appropriate  Outcome: Progressing     Problem: Nutrition/Hydration-ADULT  Goal: Nutrient/Hydration intake appropriate for improving, restoring or maintaining nutritional needs  Description: Monitor and assess patient's nutrition/hydration status for malnutrition. Collaborate with interdisciplinary team and initiate plan and interventions as ordered.  Monitor patient's weight and dietary intake as ordered or per policy. Utilize nutrition screening tool and intervene as necessary. Determine patient's food preferences and provide high-protein, high-caloric foods as appropriate. INTERVENTIONS:- Monitor oral intake, urinary output, labs, and treatment plans- Assess nutrition and hydration status and recommend course of action- Evaluate amount of meals eaten- Assist patient with  eating if necessary - Allow adequate time for meals- Recommend/ encourage appropriate diets, oral nutritional supplements, and vitamin/mineral supplements- Order, calculate, and assess calorie counts as needed- Recommend, monitor, and adjust tube feedings and TPN/PPN based on assessed needs- Assess need for intravenous fluids- Provide specific nutrition/hydration education as appropriate- Include patient/family/caregiver in decisions related to nutrition  Outcome: Progressing     Problem: COPING  Goal: Pt/Family able to verbalize concerns and demonstrate effective coping strategies  Description: INTERVENTIONS:- Assist patient/family to identify coping skills, available support systems and cultural and spiritual values- Provide emotional support, including active listening and acknowledgement of concerns of patient and caregivers- Reduce environmental stimuli, as able- Provide patient education- Assess for spiritual pain/suffering and initiate spiritual care, including notification of Pastoral Care or lucie based community as needed- Assess effectiveness of coping strategies  Outcome: Progressing  Goal: Will report anxiety at manageable levels  Description: INTERVENTIONS:- Administer medication as ordered- Teach and encourage coping skills- Provide emotional support- Assess patient/family for anxiety and ability to cope  Outcome: Progressing     Problem: Potential for Falls  Goal: Patient will remain free of falls  Description: INTERVENTIONS:- Educate patient/family on patient safety including physical limitations- Instruct patient to call for assistance with activity - Consult OT/PT to assist with strengthening/mobility - Keep Call bell within reach- Keep bed low and locked with side rails adjusted as appropriate- Keep care items and personal belongings within reach- Initiate and maintain comfort rounds- Make Fall Risk Sign visible to staff- Offer Toileting every 2 Hours, in advance of need- Initiate/Maintain  bed/chair alarm- Obtain necessary fall risk management equipment.- Apply yellow socks and bracelet for high fall risk patients- Consider moving patient to room near nurses station  INTERVENTIONS:- Educate patient/family on patient safety including physical limitations- Instruct patient to call for assistance with activity - Consult OT/PT to assist with strengthening/mobility - Keep Call bell within reach- Keep bed low and locked with side rails adjusted as appropriate- Keep care items and personal belongings within reach- Initiate and maintain comfort rounds- Make Fall Risk Sign visible to staff- Offer Toileting every 2 Hours, in advance of need- Initiate/Maintain bed alarm- Obtain necessary fall risk management equipment: bracelet, socks, alarms- Apply yellow socks and bracelet for high fall risk patients- Consider moving patient to room near nurses station  Outcome: Progressing

## 2025-05-06 NOTE — ASSESSMENT & PLAN NOTE
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  Lymphoma/leukemia panel nondiagnostic  CT head on 4/3 with interval increase in ventricular caliber concerning for worsening hydrocephalus. Repeat LP on 4/7 was again undiagnostic.   MRI of the brain from 4/11-\"Interval enlargement of the dominant left anterior basal ganglionic mass lesion with greater surrounding vasogenic edema and mass effect. 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  S/p EVD, self removed over 4/26 weekend  Completed keppra 500mg BID x 7 days for postoperative seizure ppx per neurosurgery    Plan:  Patient started on chemotherapy while inpatient.  Discussed with oncology and plan for rituximab infusion weekly and methotrexate every 2 weeks.  Patient will require daily serum methotrexate levels until less than 0.1.  Also recommend dexamethasone taper as follows:4 mg Q8H through 5/5, then 4 mg BID x4 days, then 2 mg BID x4 days, then 1 mg BID x4 days, then 1 mg daily x4 days, then discontinue.   Maintain PICC line  Nephrology & heme-onc monitoring urine pH for urine alkalinization with intermittent bicarb drip   Patient medically stable for " rehab.  Insurance Auth obtained, awaiting bed

## 2025-05-06 NOTE — ASSESSMENT & PLAN NOTE
Hemoglobin A1c 6.6% on 2/22/2025    Recent Labs     05/06/25  0500 05/06/25  0824 05/06/25  1113 05/06/25  1541   POCGLU  --  239* 182* 283*   GLUC 176*  --   --   --        PTA metformin 1000 mg daily, holding  On Lantus 10 units at bedtime   Accu-Cheks reviewed and prandial glucose above goal  Hyperglycemic secondary to steroid use  Increase lispro to 8 units 3 times daily, insulin sliding scale  Hypoglycemia protocol

## 2025-05-06 NOTE — ASSESSMENT & PLAN NOTE
64 yoM with PMHx of DM2, NAFLD, and HT who presented with progressive encephalopathy found to have multiple scattered nodular cerebral and cerebellar enhancement who underwent two non-diagnostic LPs prompting stereotactic brain biopsy (4/14) which showed large B-cell lymphoma. See oncology progress note on 4/21 for full diagnostic work up. On 4/29, agitation led to infectious work up with showed UA+ and BCx with 2/2 E. Coli sensitive to ceftriaxone. ID was consulted is managing and has approved to restart chemo 5/2.     Treatment Plan: regimen modified from (https://pubmed.ncbi.nlm.nih.gov/74734800/)    High-dose methotrexate every 2 weeks x4 cycles followed by every 4 weeks maintenance (could consider dosing every 4 weeks if he discharges to outside rehab)  C1 (4/17) 8 g/m²  C2 (5/2) 3 g/m², dose-reduced due to LETY, delayed x1 day due to E. coli bacteremia   Urine pH remained above 7.0.  Methotrexate level 24 hours post high-dose methotrexate, from 5/3/2025 was less than 0.10. Repeat levels on 5/5/25 at 0.36. Re-starting leucovorin, bicarb gtt. Will monitor urine pH and MTX level.     Discussed with RN and nephrologist.

## 2025-05-06 NOTE — ASSESSMENT & PLAN NOTE
Fever, HR.  Due to bacteremia, UTI.  No other appreciable source of infection.  ROS and exam otherwise benign..  Hemodynamically stable and nontoxic.  Resolved.    Continue to follow closely off antibiotics  Follow temperatures closely  Supportive care as per the primary service

## 2025-05-06 NOTE — ASSESSMENT & PLAN NOTE
Incidental findings on CT scan  Lab Results   Component Value Date    [id number] 0.019 (L) 04/21/2025    FREET4 1.13 (H) 04/21/2025       Nonemergent outpatient endocrine follow-up.  No need for ultrasound given patient has hyperthyroid appearance of recent labs.  Will require outpatient confirmation of TSH, free T4, plus minus further imaging with endocrine such as radioactive uptake

## 2025-05-06 NOTE — ASSESSMENT & PLAN NOTE
-Urinalysis 5/6 relatively bland  - CT scan 4/30-kidneys unremarkable and no hydronephrosis.  Large amount of stool.  - Admission creatinine was 1 mg/dL  - Peak creatinine 2.7 mg/dL on 4/22 and creatinine was slowly improving back to 1.3 mg/dL on 5/4 and slightly rising on 5/6 to 1.5 mg/dL    To note, after methotrexate infusion 5/2, her level was less than 0.1 on 5/3 but level repeated on 5/5 is 0.36.  This level returned on 5/6 and patient was restarted on leucovorin and attempted alkalinization of urine with bicarbonate based fluids.    To note patient was also being treated for E. coli bacteremia possibly urinary source.  Infectious disease team is also on board.    Overall, patient's renal function had stabilized around 1.3 mg/dL.  Was slightly higher on 5/6.  Patient is being restarted on volume expansion.  Electrolytes metabolic state is appropriate.  Will check PVR for completeness.  Repeat urine today is relatively bland.    Plan  - Noted oncology team is restarting leucovorin and bicarb drip and oncology team will be managing bicarbonate drip and leucovorin and attempt to alkalinize urine  - Oncology team will follow urine pH and methotrexate level  - I/os; avoid nephrotoxic agents  - Avoid hypotension  - Trend methotrexate level per primary oncology team  - Check PVR

## 2025-05-06 NOTE — PLAN OF CARE
Problem: PAIN - ADULT  Goal: Verbalizes/displays adequate comfort level or baseline comfort level  Description: Interventions:- Encourage patient to monitor pain and request assistance- Assess pain using appropriate pain scale- Administer analgesics based on type and severity of pain and evaluate response- Implement non-pharmacological measures as appropriate and evaluate response- Notify physician/advanced practitioner if interventions unsuccessful or patient reports new pain  Outcome: Progressing     Problem: INFECTION - ADULT  Goal: Absence or prevention of progression during hospitalization  Description: INTERVENTIONS:- Assess and monitor for signs and symptoms of infection- Monitor lab/diagnostic results- Monitor all insertion sites, i.e. indwelling lines, tubes, and drains- [place] appropriate cooling/warming therapies per order- Administer medications as ordered- Instruct and encourage patient and family to use good hand hygiene technique- Identify and instruct in appropriate isolation precautions for identified infection/condition  Outcome: Progressing     Problem: SAFETY ADULT  Goal: Patient will remain free of falls  Description: INTERVENTIONS:- Educate patient/family on patient safety including physical limitations- Instruct patient to call for assistance with activity - Consult OT/PT to assist with strengthening/mobility - Keep Call bell within reach- Keep bed low and locked with side rails adjusted as appropriate- Keep care items and personal belongings within reach- Initiate and maintain comfort rounds- Make Fall Risk Sign visible to staff- Offer Toileting every 2 Hours, in advance of need- Initiate/Maintain bed/chair alarm- Obtain necessary fall risk management equipment.- Apply yellow socks and bracelet for high fall risk patients- Consider moving patient to room near nurses station  INTERVENTIONS:- Educate patient/family on patient safety including physical limitations- Instruct patient to call  for assistance with activity - Consult OT/PT to assist with strengthening/mobility - Keep Call bell within reach- Keep bed low and locked with side rails adjusted as appropriate- Keep care items and personal belongings within reach- Initiate and maintain comfort rounds- Make Fall Risk Sign visible to staff- Offer Toileting every 2 Hours, in advance of need- Initiate/Maintain bed alarm- Obtain necessary fall risk management equipment: bracelet, socks, alarms- Apply yellow socks and bracelet for high fall risk patients- Consider moving patient to room near nurses station  Outcome: Progressing     Problem: DISCHARGE PLANNING  Goal: Discharge to home or other facility with appropriate resources  Description: INTERVENTIONS:- Identify barriers to discharge w/patient and caregiver- Arrange for needed discharge resources and transportation as appropriate- Identify discharge learning needs (meds, wound care, etc.)- Refer to Case Management Department for coordinating discharge planning if the patient needs post-hospital services based on physician/advanced practitioner order or complex needs related to functional status, cognitive ability, or social support system  Outcome: Progressing     Problem: NEUROSENSORY - ADULT  Goal: Achieves stable or improved neurological status  Description: INTERVENTIONS- Monitor and report changes in neurological status- Monitor vital signs such as temperature, blood pressure, glucose, and any other labs ordered - Initiate measures to prevent increased intracranial pressure- Monitor for seizure activity and implement precautions if appropriate    INTERVENTIONS- Monitor and report changes in neurological status- Monitor vital signs such as temperature, blood pressure, glucose, and any other labs ordered - Initiate measures to prevent increased intracranial pressure- Monitor for seizure activity and implement precautions if appropriate    Outcome: Progressing     Problem: Potential for  Falls  Goal: Patient will remain free of falls  Description: INTERVENTIONS:- Educate patient/family on patient safety including physical limitations- Instruct patient to call for assistance with activity - Consult OT/PT to assist with strengthening/mobility - Keep Call bell within reach- Keep bed low and locked with side rails adjusted as appropriate- Keep care items and personal belongings within reach- Initiate and maintain comfort rounds- Make Fall Risk Sign visible to staff- Offer Toileting every 2 Hours, in advance of need- Initiate/Maintain bed/chair alarm- Obtain necessary fall risk management equipment.- Apply yellow socks and bracelet for high fall risk patients- Consider moving patient to room near nurses station  INTERVENTIONS:- Educate patient/family on patient safety including physical limitations- Instruct patient to call for assistance with activity - Consult OT/PT to assist with strengthening/mobility - Keep Call bell within reach- Keep bed low and locked with side rails adjusted as appropriate- Keep care items and personal belongings within reach- Initiate and maintain comfort rounds- Make Fall Risk Sign visible to staff- Offer Toileting every 2 Hours, in advance of need- Initiate/Maintain bed alarm- Obtain necessary fall risk management equipment: bracelet, socks, alarms- Apply yellow socks and bracelet for high fall risk patients- Consider moving patient to room near nurses station  Outcome: Progressing

## 2025-05-06 NOTE — PROGRESS NOTES
Progress Note - Oncology-Medical   Name: Alexis Dennison 64 y.o. male I MRN: [id number]  Unit/Bed#: [facility] 904-01 I Date of Admission: 3/29/2025   Date of Service: 5/6/2025 I Hospital Day: 38    Assessment & Plan  Primary CNS lymphoma  64 yoM with PMHx of DM2, NAFLD, and HT who presented with progressive encephalopathy found to have multiple scattered nodular cerebral and cerebellar enhancement who underwent two non-diagnostic LPs prompting stereotactic brain biopsy (4/14) which showed large B-cell lymphoma. See oncology progress note on 4/21 for full diagnostic work up. On 4/29, agitation led to infectious work up with showed UA+ and BCx with 2/2 E. Coli sensitive to ceftriaxone. ID was consulted is managing and has approved to restart chemo 5/2.     Treatment Plan: regimen modified from (https://pubmed.ncbi.nlm.nih.gov/76353996/)    High-dose methotrexate every 2 weeks x4 cycles followed by every 4 weeks maintenance (could consider dosing every 4 weeks if he discharges to outside rehab)  C1 (4/17) 8 g/m²  C2 (5/2) 3 g/m², dose-reduced due to LETY, delayed x1 day due to E. coli bacteremia   Urine pH remained above 7.0.  Methotrexate level 24 hours post high-dose methotrexate, from 5/3/2025 was less than 0.10. Repeat levels on 5/5/25 at 0.36. Re-starting leucovorin, bicarb gtt. Will monitor urine pH and MTX level.     Discussed with RN and nephrologist.  E coli bacteremia  Patient met sepsis criteria on 4/29 with accompanying confusion and agitation.  UA was positive, UCx and BCx showed E. Coli sensitive to cephalosporins.  ID was consulted, is managing, and has approved to restarting chemo 5/2.  Pulmonary nodule  CTA 3/29 with nonspecific 4 mm RLL pulm nodule, recommendation for 3 month follow up  Liver lesion  CTA 3/29 with nonspecific 12mm hypodense right hepatic lesion, recommended MRI abdomen  MRI 3/30 with no suspicious hepatic lesions, demonstrated simple right hepatic lobe cyst  Leukopenia      Subjective   No  acute events noted overnight.  Patient sleeping comfortably.  1:1 remains.  MTX levels results at 0.36 on 5/5/25.     Objective :  Temp:  [97.6 °F (36.4 °C)-98.5 °F (36.9 °C)] 98.3 °F (36.8 °C)  HR:  [56-82] 61  BP: (124-140)/(71-83) 129/80  Resp:  [14-18] 14  SpO2:  [97 %-99 %] 97 %  O2 Device: None (Room air)      Physical Exam  Vitals and nursing note reviewed.   Constitutional:       General: He is not in acute distress.     Appearance: He is well-developed.   HENT:      Head: Normocephalic and atraumatic.   Eyes:      Conjunctiva/sclera: Conjunctivae normal.   Cardiovascular:      Rate and Rhythm: Normal rate.   Pulmonary:      Effort: Pulmonary effort is normal. No respiratory distress.   Musculoskeletal:         General: No swelling.      Cervical back: Neck supple.   Skin:     General: Skin is warm and dry.   Neurological:      Mental Status: He is alert.   Psychiatric:         Mood and Affect: Mood normal.         Lab Results: I have reviewed the following results:  Lab Results   Component Value Date    K 4.4 05/06/2025     05/06/2025    CO2 32 05/06/2025    BUN 36 (H) 05/06/2025    CREATININE 1.58 (H) 05/06/2025    GLUF 131 (H) 02/22/2025    CALCIUM 8.5 05/06/2025    CORRECTEDCA 9.0 05/05/2025     (H) 05/05/2025     (H) 05/05/2025    ALKPHOS 54 05/05/2025    EGFR 45 05/06/2025     Lab Results   Component Value Date    WBC 2.45 (L) 05/05/2025    HGB 9.9 (L) 05/05/2025    HCT 28.8 (L) 05/05/2025    MCV 90 05/05/2025     05/05/2025     Lab Results   Component Value Date    NEUTROABS 1.95 05/05/2025

## 2025-05-06 NOTE — PROGRESS NOTES
Progress Note - Infectious Disease   Name: Alexis Dennison 64 y.o. male I MRN: [id number]  [facility] I Date of Admission: 3/29/2025   Date of Service: 5/6/2025 I Hospital Day: 38    Assessment & Plan  Sepsis ([facility])  Fever, HR.  Due to bacteremia, UTI.  No other appreciable source of infection.  ROS and exam otherwise benign..  Hemodynamically stable and nontoxic.  Resolved.    Continue to follow closely off antibiotics  Follow temperatures closely  Supportive care as per the primary service  E coli bacteremia  Due to UTI as below.  No other appreciable source.  LFTs stable.  CT A/P unremarkable.  Consider PICC infection, patinet self-removed.  Low suspicion for CNS infection given stable albeit fluctuating mental status.  Status post 7 days IV antibiotics.    Continue to follow closely off antibiotics  E. coli UTI  Pyuria, recent external catheter.  No evidence for retention.  Unreliable historian in terms of symptoms.  Status post 7 days IV antibiotics    Continue to follow closely off antibiotics  Follow UOP closely  Leukopenia  Likely due to chemotherapy.  No kerwin neutropenia.    Recheck WBC in AM to monitor counts  Primary CNS lymphoma  Diagnosed by LP, brain biopsy.  Complicated by obstructive hydrocephalus, status post EVD 4/13 through 4/19.  Status post Rituxan, high-dose methotrexate, and intrathecal cytarabine on 4/17.  Ongoing chemo.  LETY (acute kidney injury) ([facility])  Likely multifactorial.  Overall improving.  Encephalopathy  Multifactorial due to underlying CNS lymphoma, medications, sepsis and infection as above.  Stable.  Type 2 diabetes mellitus with hyperglycemia, without long-term current use of insulin ([facility])  Lab Results   Component Value Date    HGBA1C 6.6 (H) 02/22/2025   Risk factor for infection  Constipation  Risk factor for bacteremia.        Antibiotics:  Off antibiotics #1    Subjective   Patient sleeping.  Noted to have eaten breakfast this AM.  On 1:1.  No events noted.  No  fevers.    Objective :  Temp:  [97.6 °F (36.4 °C)-98.5 °F (36.9 °C)] 97.6 °F (36.4 °C)  HR:  [56-82] 56  BP: (123-140)/(71-86) 140/71  Resp:  [16-18] 18  SpO2:  [98 %-99 %] 98 %  O2 Device: None (Room air)    General:  No acute distress  Psychiatric:  Sleeping  Pulmonary:  Normal respiratory excursion without accessory muscle use  Abdomen:  Soft, nontender  Extremities:  No edema  Skin:  No rashes      Lab Results: I have reviewed the following results:  Results from last 7 days   Lab Units 05/05/25  0439 05/03/25  1555 05/02/25  0544   WBC Thousand/uL 2.45* 3.01* 3.51*  3.51*   HEMOGLOBIN g/dL 9.9* 9.4* 10.1*  10.1*   PLATELETS Thousands/uL 191 146* 110*  110*     Results from last 7 days   Lab Units 05/06/25  0500 05/05/25  0439 05/04/25  0533   SODIUM mmol/L 142 145 141   POTASSIUM mmol/L 4.4 4.1 3.8   CHLORIDE mmol/L 105 104 102   CO2 mmol/L 32 33* 33*   BUN mg/dL 36* 28* 24   CREATININE mg/dL 1.58* 1.36* 1.27   EGFR ml/min/1.73sq m 45 54 58   CALCIUM mg/dL 8.5 8.2* 7.8*   AST U/L  --  114*  --    ALT U/L  --  149*  --    ALK PHOS U/L  --  54  --    ALBUMIN g/dL  --  3.0*  --      Results from last 7 days   Lab Units 04/29/25  1448   URINE CULTURE  >100,000 cfu/ml Escherichia coli*

## 2025-05-06 NOTE — ASSESSMENT & PLAN NOTE
Resolved   As evidenced on 4/29 morning with temperature and tachycardia.  Worsening kidney function.  Lactate within normal limits  S/p 1 L bolus, continue IV fluids  Elevated procalcitonin in the setting of recent rituximab  Secondary to UTI, bacteremia  UA with innumerable bacteria and large leukocytes  Urine culture with E. coli  Blood culture with 1 out of 2 with E. coli  S/p 7 days IV cefazolin ended 5/5

## 2025-05-06 NOTE — ASSESSMENT & PLAN NOTE
Due to UTI as below.  No other appreciable source.  LFTs stable.  CT A/P unremarkable.  Consider PICC infection, patinet self-removed.  Low suspicion for CNS infection given stable albeit fluctuating mental status.  Status post 7 days IV antibiotics.    Continue to follow closely off antibiotics

## 2025-05-06 NOTE — PLAN OF CARE
Problem: PAIN - ADULT  Goal: Verbalizes/displays adequate comfort level or baseline comfort level  Description: Interventions:- Encourage patient to monitor pain and request assistance- Assess pain using appropriate pain scale- Administer analgesics based on type and severity of pain and evaluate response- Implement non-pharmacological measures as appropriate and evaluate response- Notify physician/advanced practitioner if interventions unsuccessful or patient reports new pain  Outcome: Progressing     Problem: SAFETY ADULT  Goal: Patient will remain free of falls  Description: INTERVENTIONS:- Educate patient/family on patient safety including physical limitations- Instruct patient to call for assistance with activity - Consult OT/PT to assist with strengthening/mobility - Keep Call bell within reach- Keep bed low and locked with side rails adjusted as appropriate- Keep care items and personal belongings within reach- Initiate and maintain comfort rounds- Make Fall Risk Sign visible to staff- Offer Toileting every 2 Hours, in advance of need- Initiate/Maintain bed/chair alarm- Obtain necessary fall risk management equipment.- Apply yellow socks and bracelet for high fall risk patients- Consider moving patient to room near nurses station  INTERVENTIONS:- Educate patient/family on patient safety including physical limitations- Instruct patient to call for assistance with activity - Consult OT/PT to assist with strengthening/mobility - Keep Call bell within reach- Keep bed low and locked with side rails adjusted as appropriate- Keep care items and personal belongings within reach- Initiate and maintain comfort rounds- Make Fall Risk Sign visible to staff- Offer Toileting every 2 Hours, in advance of need- Initiate/Maintain bed alarm- Obtain necessary fall risk management equipment: bracelet, socks, alarms- Apply yellow socks and bracelet for high fall risk patients- Consider moving patient to room near nurses  station  Outcome: Progressing     Problem: DISCHARGE PLANNING  Goal: Discharge to home or other facility with appropriate resources  Description: INTERVENTIONS:- Identify barriers to discharge w/patient and caregiver- Arrange for needed discharge resources and transportation as appropriate- Identify discharge learning needs (meds, wound care, etc.)- Refer to Case Management Department for coordinating discharge planning if the patient needs post-hospital services based on physician/advanced practitioner order or complex needs related to functional status, cognitive ability, or social support system  Outcome: Progressing     Problem: NEUROSENSORY - ADULT  Goal: Achieves stable or improved neurological status  Description: INTERVENTIONS- Monitor and report changes in neurological status- Monitor vital signs such as temperature, blood pressure, glucose, and any other labs ordered - Initiate measures to prevent increased intracranial pressure- Monitor for seizure activity and implement precautions if appropriate    INTERVENTIONS- Monitor and report changes in neurological status- Monitor vital signs such as temperature, blood pressure, glucose, and any other labs ordered - Initiate measures to prevent increased intracranial pressure- Monitor for seizure activity and implement precautions if appropriate    Outcome: Progressing     Problem: SAFETY,RESTRAINT: NV/NON-SELF DESTRUCTIVE BEHAVIOR  Goal: Returns to optimal restraint-free functioning  Description: INTERVENTIONS:- Assess the patient's behavior and symptoms that indicate continued need for restraint- Identify and implement measures to help patient regain control- Assess readiness for release of restraint   Outcome: Progressing     Problem: CARDIOVASCULAR - ADULT  Goal: Maintains optimal cardiac output and hemodynamic stability  Description: INTERVENTIONS:- Monitor I/O, vital signs and rhythm- Monitor for S/S and trends of decreased cardiac output- Administer and  titrate ordered vasoactive medications to optimize hemodynamic stability- Assess quality of pulses, skin color and temperature- Assess for signs of decreased coronary artery perfusion- Instruct patient to report change in severity of symptoms  Outcome: Progressing     Problem: MUSCULOSKELETAL - ADULT  Goal: Maintain or return mobility to safest level of function  Description: INTERVENTIONS:- Assess patient's ability to carry out ADLs; assess patient's baseline for ADL function and identify physical deficits which impact ability to perform ADLs (bathing, care of mouth/teeth, toileting, grooming, dressing, etc.)- Assess/evaluate cause of self-care deficits - Assess range of motion- Assess patient's mobility- Assess patient's need for assistive devices and provide as appropriate- Encourage maximum independence but intervene and supervise when necessary- Involve family in performance of ADLs- Assess for home care needs following discharge - Consider OT consult to assist with ADL evaluation and planning for discharge- Provide patient education as appropriate  Outcome: Progressing     Problem: Potential for Falls  Goal: Patient will remain free of falls  Description: INTERVENTIONS:- Educate patient/family on patient safety including physical limitations- Instruct patient to call for assistance with activity - Consult OT/PT to assist with strengthening/mobility - Keep Call bell within reach- Keep bed low and locked with side rails adjusted as appropriate- Keep care items and personal belongings within reach- Initiate and maintain comfort rounds- Make Fall Risk Sign visible to staff- Offer Toileting every 2 Hours, in advance of need- Initiate/Maintain bed/chair alarm- Obtain necessary fall risk management equipment.- Apply yellow socks and bracelet for high fall risk patients- Consider moving patient to room near nurses station  INTERVENTIONS:- Educate patient/family on patient safety including physical limitations-  Instruct patient to call for assistance with activity - Consult OT/PT to assist with strengthening/mobility - Keep Call bell within reach- Keep bed low and locked with side rails adjusted as appropriate- Keep care items and personal belongings within reach- Initiate and maintain comfort rounds- Make Fall Risk Sign visible to staff- Offer Toileting every 2 Hours, in advance of need- Initiate/Maintain bed alarm- Obtain necessary fall risk management equipment: bracelet, socks, alarms- Apply yellow socks and bracelet for high fall risk patients- Consider moving patient to room near nurses station  Outcome: Progressing

## 2025-05-06 NOTE — PROGRESS NOTES
Progress Note - Nephrology   Name: Alexis Dennison 64 y.o. male I MRN: [id number]  Unit/Bed#: [facility] 904-01 I Date of Admission: 3/29/2025   Date of Service: 5/6/2025 I Hospital Day: 38    Assessment & Plan  LTEY (acute kidney injury) (HCC)  -Urinalysis 5/6 relatively bland  - CT scan 4/30-kidneys unremarkable and no hydronephrosis.  Large amount of stool.  - Admission creatinine was 1 mg/dL  - Peak creatinine 2.7 mg/dL on 4/22 and creatinine was slowly improving back to 1.3 mg/dL on 5/4 and slightly rising on 5/6 to 1.5 mg/dL    To note, after methotrexate infusion 5/2, her level was less than 0.1 on 5/3 but level repeated on 5/5 is 0.36.  This level returned on 5/6 and patient was restarted on leucovorin and attempted alkalinization of urine with bicarbonate based fluids.    To note patient was also being treated for E. coli bacteremia possibly urinary source.  Infectious disease team is also on board.    Overall, patient's renal function had stabilized around 1.3 mg/dL.  Was slightly higher on 5/6.  Patient is being restarted on volume expansion.  Electrolytes metabolic state is appropriate.  Will check PVR for completeness.  Repeat urine today is relatively bland.    Plan  - Noted oncology team is restarting leucovorin and bicarb drip and oncology team will be managing bicarbonate drip and leucovorin and attempt to alkalinize urine  - Oncology team will follow urine pH and methotrexate level  - I/os; avoid nephrotoxic agents  - Avoid hypotension  - Trend methotrexate level per primary oncology team  - Check PVR      At risk for acid-base imbalance  Appropriate   Electrolyte imbalance risk  appropriate  Leukopenia  Per oncology team  HTN, goal below 130/80  -Currently blood pressure is appropriate  Primary CNS lymphoma  -Onchemotherapy per primary team  - See above regarding methotrexate discussion    I have reviewed the nephrology recommendations including bicarbonate drip and leucovorin by primary team and trending  urine pH, with primary team attending, and we are in agreement with renal plan including the information outlined above.     Subjective   Brief History of Admission -64-year-old male with a past medical history of recently diagnosed CNS large B-cell lymphoma, obstructive hydrocephalus status post EVD, recently started on admission on rituximab and high-dose methotrexate with rescue leucovorin with LETY in April 2025 in the setting of relative hypotension/methotrexate?,  With improvement in renal function for home renal team had signed off on starting April 25 then renal team was reconsulted May 2 as the patient was going to be receiving methotrexate again and given recent acute kidney injury.  Patient's creatinine overall had improved from a peak of 2.7 mg/dL and sarahi to 1.3 mg/dL with slight increase to 1.5 mg/dL on 5/3.  Received second dose of methotrexate on 5/2 with alkalinization of urine and leucovorin and urine pH checks.  Methotrexate level post second dose of methotrexate was less than 0.1.  Creatinine was improving by 5/4 to 1.2 mg/dL.    No acute issues overnight.  One-to-one present at bedside.  Blood pressure is 1/21/1940 systolic.  On room air.  Urine output 3 L yesterday.    Objective :  Temp:  [97.6 °F (36.4 °C)-98.5 °F (36.9 °C)] 98.3 °F (36.8 °C)  HR:  [56-66] 61  BP: (129-140)/(71-80) 129/80  Resp:  [14-18] 14  SpO2:  [97 %-99 %] 97 %  O2 Device: None (Room air)    Current Weight: Weight - Scale: 75.5 kg (166 lb 7.2 oz)  First Weight: Weight - Scale: 86.2 kg (190 lb)  I/O         05/04 0701  05/05 0700 05/05 0701  05/06 0700 05/06 0701  05/07 0700    P.O. 840 120 370    I.V. (mL/kg) 1353.3 (17.9)      IV Piggyback 150 100     Total Intake(mL/kg) 2343.3 (31) 220 (2.9) 370 (4.9)    Urine (mL/kg/hr) 3520 (1.9) 3000 (1.7) 200 (0.3)    Stool 0      Total Output 3520 3000 200    Net -1176.7 -2780 +170           Unmeasured Urine Occurrence 2 x      Unmeasured Stool Occurrence 1 x            Physical  Exam  General: NAD  Skin: no rash  Eyes: anicteric sclera  ENT: moist mucous membrane  Neck: supple  Chest: CTA b/l, no ronchii, no wheeze, no rubs, no rales  CVS: s1s2, no murmur, no gallop, no rub  Abdomen: soft, nontender, nl sounds  Extremities: no edema LE b/l  : no connell  Neuro: AAOX  Psych: normal affect    Medications:    Current Facility-Administered Medications:     acetaminophen (TYLENOL) tablet 650 mg, 650 mg, Oral, Q6H PRN, Rustam Drummond MD    allopurinol (ZYLOPRIM) tablet 300 mg, 300 mg, Oral, Daily, Rustam Drummond MD, 300 mg at 05/06/25 0837    alteplase (CATHFLO) injection 2 mg, 2 mg, Intracatheter, Q1MIN PRN, Eva Ortiz MD    aluminum-magnesium hydroxide-simethicone (MAALOX) oral suspension 30 mL, 30 mL, Oral, Q4H PRN, Rustam Drummond MD    atorvastatin (LIPITOR) tablet 20 mg, 20 mg, Oral, Daily, Rustam Drummond MD, 20 mg at 05/06/25 0837    bisacodyl (DULCOLAX) rectal suppository 10 mg, 10 mg, Rectal, Daily, Rustam Drummond MD, 10 mg at 05/01/25 1259    calcium carbonate (TUMS) chewable tablet 1,000 mg, 1,000 mg, Oral, Daily PRN, Rustam Drummond MD    chlorhexidine (PERIDEX) 0.12 % oral rinse 15 mL, 15 mL, Mouth/Throat, Q12H CAIT, Rustam Drummond MD, 15 mL at 05/06/25 0837    dexamethasone (DECADRON) tablet 4 mg, 4 mg, Oral, Q8H CAIT, Dionne Huang MD, 4 mg at 05/06/25 1434    heparin (porcine) subcutaneous injection 5,000 Units, 5,000 Units, Subcutaneous, Q8H CAIT, Rustam Drummond MD, 5,000 Units at 05/06/25 1434    insulin glargine (LANTUS) subcutaneous injection 10 Units 0.1 mL, 10 Units, Subcutaneous, HS, Rustam Drummond MD, 10 Units at 05/05/25 2156    insulin lispro (HumALOG/ADMELOG) 100 units/mL subcutaneous injection 2-12 Units, 2-12 Units, Subcutaneous, 4x Daily (AC & HS), 2 Units at 05/06/25 1228 **AND** Fingerstick Glucose (POCT), , , 4x Daily AC and at bedtime, Dionne Huang MD    insulin lispro  (HumALOG/ADMELOG) 100 units/mL subcutaneous injection 8 Units, 8 Units, Subcutaneous, TID With Meals, Dionne Huang MD, 8 Units at 05/06/25 1228    leucovorin 25 mg in dextrose 5 % 50 mL IVPB, 25 mg, Intravenous, Q6H, Brooklyn Jenny Dumont, DO, Last Rate: 210 mL/hr at 05/06/25 1315, 25 mg at 05/06/25 1315    melatonin tablet 6 mg, 6 mg, Oral, HS, Rustam Drummond MD, 6 mg at 05/05/25 1957    OLANZapine (ZyPREXA) IM injection 5 mg, 5 mg, Intramuscular, BID PRN, Dionne Huang MD, 5 mg at 05/01/25 1122    ondansetron (ZOFRAN) injection 4 mg, 4 mg, Intravenous, Q6H PRN, Rustam Drummond MD, 4 mg at 04/14/25 0930    oxyCODONE (ROXICODONE) split tablet 2.5 mg, 2.5 mg, Oral, Q4H PRN **OR** oxyCODONE (ROXICODONE) IR tablet 5 mg, 5 mg, Oral, Q4H PRN, Rustam Drummond MD, 5 mg at 04/29/25 1351    pantoprazole (PROTONIX) EC tablet 40 mg, 40 mg, Oral, Early Morning, Rustam Drummond MD, 40 mg at 05/06/25 0502    QUEtiapine (SEROquel) tablet 12.5 mg, 12.5 mg, Oral, Daily, Reji Kay MD, 12.5 mg at 05/06/25 1228    QUEtiapine (SEROquel) tablet 50 mg, 50 mg, Oral, HS, Rylie Stapleton PA-C, 50 mg at 05/05/25 2156    senna (SENOKOT) tablet 17.2 mg, 2 tablet, Oral, HS, Rustam Drummond MD, 17.2 mg at 05/01/25 2325    sodium bicarbonate 100 mEq in dextrose 5 % 1,000 mL infusion, 100 mL/hr, Intravenous, Continuous, Brooklyn Jenny Dumont, DO, Last Rate: 100 mL/hr at 05/06/25 1323, 100 mL/hr at 05/06/25 1323    sodium chloride 0.9 % infusion, 20 mL/hr, Intravenous, Once PRN, Magali Lee MD      Lab Results: I have reviewed the following results:  Results from last 7 days   Lab Units 05/06/25  0500 05/05/25  0439 05/04/25  0533 05/03/25  1555 05/03/25  0521 05/02/25  0544 05/01/25  0612 04/30/25  1304 04/30/25  1144   WBC Thousand/uL  --  2.45*  --  3.01*  --  3.51*  3.51* 4.62  --  5.69   HEMOGLOBIN g/dL  --  9.9*  --  9.4*  --  10.1*  10.1* 10.2*  --  11.3*   HEMATOCRIT %  --  28.8*  --  26.1*  --   "28.2*  28.2* 29.6*  --  32.4*   PLATELETS Thousands/uL  --  191  --  146*  --  110*  110* 77*  --  67*   POTASSIUM mmol/L 4.4 4.1 3.8  --  3.7 4.0 5.3 4.4  --    CHLORIDE mmol/L 105 104 102  --  103 102 100 102  --    CO2 mmol/L 32 33* 33*  --  30 31 28 28  --    BUN mg/dL 36* 28* 24  --  30* 32* 42* 49*  --    CREATININE mg/dL 1.58* 1.36* 1.27  --  1.52* 1.37* 1.47* 1.74*  --    CALCIUM mg/dL 8.5 8.2* 7.8*  --  7.8* 8.4 8.2* 9.1  --    PHOSPHORUS mg/dL  --  3.0 2.5  --  2.7  --   --   --   --    ALBUMIN g/dL  --  3.0*  --   --   --   --   --   --   --        Administrative Statements     Portions of the record may have been created with voice recognition software. Occasional wrong word or \"sound a like\" substitutions may have occurred due to the inherent limitations of voice recognition software. Read the chart carefully and recognize, using context, where substitutions have occurred.If you have any questions, please contact the dictating provider.  "

## 2025-05-06 NOTE — ASSESSMENT & PLAN NOTE
Blood cultures with 1 out of 2 with E. Coli  CT abdomen pelvis on 4/30 with no acute pathology  Status post 7 days IV cefazolin that ended 5/5

## 2025-05-06 NOTE — RESTORATIVE TECHNICIAN NOTE
Restorative Technician Note      Patient Name: Aelxis Dennison     Restorative Tech Visit Date: 05/06/25  Note Type: Mobility  Patient Position Upon Consult: Supine  Activity Performed: Ambulated  Assistive Device: Other (Comment) (HHA)  Patient Position at End of Consult: Supine; All needs within reach; Other (comment) (with 1:1)  Nurse Communication: Nurse aware of consult, application of brace    Sin Ramirez, Restorative Technician

## 2025-05-07 ENCOUNTER — HOSPITAL ENCOUNTER (INPATIENT)
Facility: HOSPITAL | Age: 65
LOS: 21 days | Discharge: HOME HEALTH | DRG: 949 | End: 2025-05-28
Attending: FAMILY MEDICINE | Admitting: FAMILY MEDICINE
Payer: COMMERCIAL

## 2025-05-07 VITALS
DIASTOLIC BLOOD PRESSURE: 68 MMHG | BODY MASS INDEX: 23.83 KG/M2 | SYSTOLIC BLOOD PRESSURE: 135 MMHG | RESPIRATION RATE: 17 BRPM | TEMPERATURE: 98.4 F | HEIGHT: 70 IN | HEART RATE: 60 BPM | OXYGEN SATURATION: 98 % | WEIGHT: 166.45 LBS

## 2025-05-07 DIAGNOSIS — Z74.09 IMPAIRED MOBILITY AND ACTIVITIES OF DAILY LIVING: ICD-10-CM

## 2025-05-07 DIAGNOSIS — C83.390 PRIMARY CENTRAL NERVOUS SYSTEM (CNS) LYMPHOMA: ICD-10-CM

## 2025-05-07 DIAGNOSIS — Z91.89 AT RISK FOR ACID-BASE IMBALANCE: ICD-10-CM

## 2025-05-07 DIAGNOSIS — E05.90 HYPERTHYROIDISM: ICD-10-CM

## 2025-05-07 DIAGNOSIS — T45.1X5A CHEMOTHERAPY INDUCED NEUTROPENIA (HCC): ICD-10-CM

## 2025-05-07 DIAGNOSIS — B96.20 E. COLI UTI: ICD-10-CM

## 2025-05-07 DIAGNOSIS — K59.01 SLOW TRANSIT CONSTIPATION: ICD-10-CM

## 2025-05-07 DIAGNOSIS — A41.9 SEPSIS (HCC): ICD-10-CM

## 2025-05-07 DIAGNOSIS — K21.9 GERD (GASTROESOPHAGEAL REFLUX DISEASE): ICD-10-CM

## 2025-05-07 DIAGNOSIS — N39.0 E. COLI UTI: ICD-10-CM

## 2025-05-07 DIAGNOSIS — R26.2 AMBULATORY DYSFUNCTION: Primary | ICD-10-CM

## 2025-05-07 DIAGNOSIS — D70.1 CHEMOTHERAPY INDUCED NEUTROPENIA (HCC): ICD-10-CM

## 2025-05-07 DIAGNOSIS — G93.40 ENCEPHALOPATHY, UNSPECIFIED TYPE: ICD-10-CM

## 2025-05-07 DIAGNOSIS — Z51.5 PALLIATIVE CARE ENCOUNTER: ICD-10-CM

## 2025-05-07 DIAGNOSIS — Z78.9 IMPAIRED MOBILITY AND ACTIVITIES OF DAILY LIVING: ICD-10-CM

## 2025-05-07 DIAGNOSIS — E04.1 THYROID NODULE: ICD-10-CM

## 2025-05-07 DIAGNOSIS — C83.390 PRIMARY CNS LYMPHOMA: ICD-10-CM

## 2025-05-07 PROBLEM — K59.00 CONSTIPATION: Status: RESOLVED | Noted: 2025-04-01 | Resolved: 2025-05-07

## 2025-05-07 LAB
ANION GAP SERPL CALCULATED.3IONS-SCNC: 5 MMOL/L (ref 4–13)
BACTERIA UR QL AUTO: NORMAL /HPF
BILIRUB UR QL STRIP: NEGATIVE
BUN SERPL-MCNC: 40 MG/DL (ref 5–25)
CALCIUM SERPL-MCNC: 8.4 MG/DL (ref 8.4–10.2)
CHLORIDE SERPL-SCNC: 99 MMOL/L (ref 96–108)
CLARITY UR: CLEAR
CO2 SERPL-SCNC: 34 MMOL/L (ref 21–32)
COLOR UR: COLORLESS
CREAT SERPL-MCNC: 1.36 MG/DL (ref 0.6–1.3)
ERYTHROCYTE [DISTWIDTH] IN BLOOD BY AUTOMATED COUNT: 11.4 % (ref 11.6–15.1)
GFR SERPL CREATININE-BSD FRML MDRD: 54 ML/MIN/1.73SQ M
GLUCOSE SERPL-MCNC: 106 MG/DL (ref 65–140)
GLUCOSE SERPL-MCNC: 240 MG/DL (ref 65–140)
GLUCOSE SERPL-MCNC: 250 MG/DL (ref 65–140)
GLUCOSE SERPL-MCNC: 266 MG/DL (ref 65–140)
GLUCOSE SERPL-MCNC: 313 MG/DL (ref 65–140)
GLUCOSE UR STRIP-MCNC: ABNORMAL MG/DL
HCT VFR BLD AUTO: 25.3 % (ref 36.5–49.3)
HGB BLD-MCNC: 8.7 G/DL (ref 12–17)
HGB UR QL STRIP.AUTO: NEGATIVE
KETONES UR STRIP-MCNC: NEGATIVE MG/DL
LEUKOCYTE ESTERASE UR QL STRIP: NEGATIVE
MAGNESIUM SERPL-MCNC: 2 MG/DL (ref 1.9–2.7)
MCH RBC QN AUTO: 30.4 PG (ref 26.8–34.3)
MCHC RBC AUTO-ENTMCNC: 34.4 G/DL (ref 31.4–37.4)
MCV RBC AUTO: 89 FL (ref 82–98)
NITRITE UR QL STRIP: NEGATIVE
NON-SQ EPI CELLS URNS QL MICRO: NORMAL /HPF
PH UR STRIP.AUTO: 8 [PH]
PHOSPHATE SERPL-MCNC: 3.2 MG/DL (ref 2.3–4.1)
PLATELET # BLD AUTO: 213 THOUSANDS/UL (ref 149–390)
PMV BLD AUTO: 10.3 FL (ref 8.9–12.7)
POTASSIUM SERPL-SCNC: 4.3 MMOL/L (ref 3.5–5.3)
PROT UR STRIP-MCNC: NEGATIVE MG/DL
RBC # BLD AUTO: 2.86 MILLION/UL (ref 3.88–5.62)
RBC #/AREA URNS AUTO: NORMAL /HPF
SODIUM SERPL-SCNC: 138 MMOL/L (ref 135–147)
SP GR UR STRIP.AUTO: 1.01 (ref 1–1.03)
UROBILINOGEN UR STRIP-ACNC: <2 MG/DL
WBC # BLD AUTO: 3.11 THOUSAND/UL (ref 4.31–10.16)
WBC #/AREA URNS AUTO: NORMAL /HPF

## 2025-05-07 PROCEDURE — 99233 SBSQ HOSP IP/OBS HIGH 50: CPT | Performed by: INTERNAL MEDICINE

## 2025-05-07 PROCEDURE — 99232 SBSQ HOSP IP/OBS MODERATE 35: CPT | Performed by: INTERNAL MEDICINE

## 2025-05-07 PROCEDURE — 80204 DRUG ASSAY METHOTREXATE: CPT | Performed by: INTERNAL MEDICINE

## 2025-05-07 PROCEDURE — 82948 REAGENT STRIP/BLOOD GLUCOSE: CPT

## 2025-05-07 PROCEDURE — 81001 URINALYSIS AUTO W/SCOPE: CPT | Performed by: INTERNAL MEDICINE

## 2025-05-07 PROCEDURE — 85027 COMPLETE CBC AUTOMATED: CPT | Performed by: INTERNAL MEDICINE

## 2025-05-07 PROCEDURE — 99239 HOSP IP/OBS DSCHRG MGMT >30: CPT

## 2025-05-07 PROCEDURE — 83735 ASSAY OF MAGNESIUM: CPT | Performed by: INTERNAL MEDICINE

## 2025-05-07 PROCEDURE — 80048 BASIC METABOLIC PNL TOTAL CA: CPT | Performed by: INTERNAL MEDICINE

## 2025-05-07 PROCEDURE — 99223 1ST HOSP IP/OBS HIGH 75: CPT | Performed by: FAMILY MEDICINE

## 2025-05-07 PROCEDURE — NC001 PR NO CHARGE: Performed by: STUDENT IN AN ORGANIZED HEALTH CARE EDUCATION/TRAINING PROGRAM

## 2025-05-07 PROCEDURE — G0545 PR INHERENT VISIT TO INPT: HCPCS | Performed by: INTERNAL MEDICINE

## 2025-05-07 PROCEDURE — 99233 SBSQ HOSP IP/OBS HIGH 50: CPT

## 2025-05-07 PROCEDURE — 84100 ASSAY OF PHOSPHORUS: CPT | Performed by: INTERNAL MEDICINE

## 2025-05-07 RX ORDER — QUETIAPINE FUMARATE 25 MG/1
12.5 TABLET, FILM COATED ORAL DAILY
Status: DISCONTINUED | OUTPATIENT
Start: 2025-05-08 | End: 2025-05-28 | Stop reason: HOSPADM

## 2025-05-07 RX ORDER — DEXAMETHASONE 4 MG/1
4 TABLET ORAL EVERY 12 HOURS SCHEDULED
Status: COMPLETED | OUTPATIENT
Start: 2025-05-07 | End: 2025-05-11

## 2025-05-07 RX ORDER — INSULIN LISPRO 100 [IU]/ML
2-12 INJECTION, SOLUTION INTRAVENOUS; SUBCUTANEOUS
Status: DISCONTINUED | OUTPATIENT
Start: 2025-05-07 | End: 2025-05-16

## 2025-05-07 RX ORDER — ATORVASTATIN CALCIUM 20 MG/1
20 TABLET, FILM COATED ORAL DAILY
Status: DISCONTINUED | OUTPATIENT
Start: 2025-05-08 | End: 2025-05-28 | Stop reason: HOSPADM

## 2025-05-07 RX ORDER — CALCIUM CARBONATE 500 MG/1
1000 TABLET, CHEWABLE ORAL DAILY PRN
Status: DISCONTINUED | OUTPATIENT
Start: 2025-05-07 | End: 2025-05-28 | Stop reason: HOSPADM

## 2025-05-07 RX ORDER — DEXAMETHASONE 2 MG/1
1 TABLET ORAL EVERY 12 HOURS SCHEDULED
Status: COMPLETED | OUTPATIENT
Start: 2025-05-15 | End: 2025-05-19

## 2025-05-07 RX ORDER — SENNOSIDES 8.6 MG
17.2 TABLET ORAL
Status: ON HOLD
Start: 2025-05-07

## 2025-05-07 RX ORDER — SENNOSIDES 8.6 MG
2 TABLET ORAL
Status: DISCONTINUED | OUTPATIENT
Start: 2025-05-07 | End: 2025-05-08

## 2025-05-07 RX ORDER — MAGNESIUM HYDROXIDE/ALUMINUM HYDROXICE/SIMETHICONE 120; 1200; 1200 MG/30ML; MG/30ML; MG/30ML
30 SUSPENSION ORAL EVERY 4 HOURS PRN
Status: DISCONTINUED | OUTPATIENT
Start: 2025-05-07 | End: 2025-05-28 | Stop reason: HOSPADM

## 2025-05-07 RX ORDER — DEXAMETHASONE 4 MG/1
4 TABLET ORAL EVERY 8 HOURS SCHEDULED
Status: ON HOLD
Start: 2025-05-07

## 2025-05-07 RX ORDER — INSULIN LISPRO 100 [IU]/ML
8 INJECTION, SOLUTION INTRAVENOUS; SUBCUTANEOUS
Status: DISCONTINUED | OUTPATIENT
Start: 2025-05-08 | End: 2025-05-08

## 2025-05-07 RX ORDER — CHLORHEXIDINE GLUCONATE ORAL RINSE 1.2 MG/ML
15 SOLUTION DENTAL EVERY 12 HOURS SCHEDULED
Status: DISCONTINUED | OUTPATIENT
Start: 2025-05-07 | End: 2025-05-28 | Stop reason: HOSPADM

## 2025-05-07 RX ORDER — OXYCODONE HYDROCHLORIDE 5 MG/1
5 TABLET ORAL EVERY 4 HOURS PRN
Refills: 0 | Status: DISCONTINUED | OUTPATIENT
Start: 2025-05-07 | End: 2025-05-28 | Stop reason: HOSPADM

## 2025-05-07 RX ORDER — METFORMIN HYDROCHLORIDE 500 MG/1
1000 TABLET, EXTENDED RELEASE ORAL DAILY
Status: DISCONTINUED | OUTPATIENT
Start: 2025-05-07 | End: 2025-05-22

## 2025-05-07 RX ORDER — QUETIAPINE FUMARATE 25 MG/1
12.5 TABLET, FILM COATED ORAL DAILY
Status: ON HOLD
Start: 2025-05-07

## 2025-05-07 RX ORDER — HEPARIN SODIUM 5000 [USP'U]/ML
5000 INJECTION, SOLUTION INTRAVENOUS; SUBCUTANEOUS EVERY 8 HOURS SCHEDULED
Status: DISCONTINUED | OUTPATIENT
Start: 2025-05-07 | End: 2025-05-28 | Stop reason: HOSPADM

## 2025-05-07 RX ORDER — INSULIN GLARGINE 100 [IU]/ML
10 INJECTION, SOLUTION SUBCUTANEOUS
Status: DISCONTINUED | OUTPATIENT
Start: 2025-05-07 | End: 2025-05-13

## 2025-05-07 RX ORDER — ONDANSETRON 2 MG/ML
4 INJECTION INTRAMUSCULAR; INTRAVENOUS EVERY 6 HOURS PRN
Status: DISCONTINUED | OUTPATIENT
Start: 2025-05-07 | End: 2025-05-28 | Stop reason: HOSPADM

## 2025-05-07 RX ORDER — DEXAMETHASONE 2 MG/1
2 TABLET ORAL EVERY 12 HOURS SCHEDULED
Status: COMPLETED | OUTPATIENT
Start: 2025-05-11 | End: 2025-05-15

## 2025-05-07 RX ORDER — SODIUM CHLORIDE 9 MG/ML
20 INJECTION, SOLUTION INTRAVENOUS ONCE AS NEEDED
Status: DISCONTINUED | OUTPATIENT
Start: 2025-05-07 | End: 2025-05-17

## 2025-05-07 RX ORDER — ALLOPURINOL 300 MG/1
300 TABLET ORAL DAILY
Status: DISCONTINUED | OUTPATIENT
Start: 2025-05-08 | End: 2025-05-28 | Stop reason: HOSPADM

## 2025-05-07 RX ORDER — QUETIAPINE FUMARATE 25 MG/1
50 TABLET, FILM COATED ORAL
Status: DISCONTINUED | OUTPATIENT
Start: 2025-05-08 | End: 2025-05-27

## 2025-05-07 RX ORDER — ALLOPURINOL 300 MG/1
300 TABLET ORAL DAILY
Status: ON HOLD
Start: 2025-05-07

## 2025-05-07 RX ORDER — DEXAMETHASONE 4 MG/1
4 TABLET ORAL EVERY 8 HOURS SCHEDULED
Status: DISCONTINUED | OUTPATIENT
Start: 2025-05-07 | End: 2025-05-07

## 2025-05-07 RX ORDER — PANTOPRAZOLE SODIUM 40 MG/1
40 TABLET, DELAYED RELEASE ORAL
Status: DISCONTINUED | OUTPATIENT
Start: 2025-05-08 | End: 2025-05-28 | Stop reason: HOSPADM

## 2025-05-07 RX ORDER — QUETIAPINE FUMARATE 50 MG/1
50 TABLET, FILM COATED ORAL
Status: ON HOLD
Start: 2025-05-07

## 2025-05-07 RX ORDER — BISACODYL 10 MG
10 SUPPOSITORY, RECTAL RECTAL DAILY
Status: DISCONTINUED | OUTPATIENT
Start: 2025-05-08 | End: 2025-05-08

## 2025-05-07 RX ORDER — PANTOPRAZOLE SODIUM 40 MG/1
40 TABLET, DELAYED RELEASE ORAL
Status: ON HOLD
Start: 2025-05-07

## 2025-05-07 RX ORDER — OLANZAPINE 10 MG/2ML
5 INJECTION, POWDER, FOR SOLUTION INTRAMUSCULAR 2 TIMES DAILY PRN
Status: DISCONTINUED | OUTPATIENT
Start: 2025-05-07 | End: 2025-05-28 | Stop reason: HOSPADM

## 2025-05-07 RX ORDER — ACETAMINOPHEN 325 MG/1
650 TABLET ORAL EVERY 6 HOURS PRN
Status: DISCONTINUED | OUTPATIENT
Start: 2025-05-07 | End: 2025-05-28 | Stop reason: HOSPADM

## 2025-05-07 RX ADMIN — HEPARIN SODIUM 5000 UNITS: 5000 INJECTION INTRAVENOUS; SUBCUTANEOUS at 13:55

## 2025-05-07 RX ADMIN — PANTOPRAZOLE SODIUM 40 MG: 40 TABLET, DELAYED RELEASE ORAL at 05:53

## 2025-05-07 RX ADMIN — BISACODYL 10 MG: 10 SUPPOSITORY RECTAL at 16:51

## 2025-05-07 RX ADMIN — LEUCOVORIN CALCIUM 25 MG: 50 INJECTION, POWDER, LYOPHILIZED, FOR SOLUTION INTRAMUSCULAR; INTRAVENOUS at 22:17

## 2025-05-07 RX ADMIN — DEXAMETHASONE 4 MG: 4 TABLET ORAL at 05:53

## 2025-05-07 RX ADMIN — INSULIN LISPRO 8 UNITS: 100 INJECTION, SOLUTION INTRAVENOUS; SUBCUTANEOUS at 17:06

## 2025-05-07 RX ADMIN — CHLORHEXIDINE GLUCONATE 15 ML: 1.2 SOLUTION ORAL at 21:50

## 2025-05-07 RX ADMIN — LEUCOVORIN CALCIUM 25 MG: 50 INJECTION, POWDER, LYOPHILIZED, FOR SOLUTION INTRAMUSCULAR; INTRAVENOUS at 13:55

## 2025-05-07 RX ADMIN — Medication 6 MG: at 22:16

## 2025-05-07 RX ADMIN — SENNOSIDES 17.2 MG: 8.6 TABLET, FILM COATED ORAL at 21:57

## 2025-05-07 RX ADMIN — INSULIN LISPRO 8 UNITS: 100 INJECTION, SOLUTION INTRAVENOUS; SUBCUTANEOUS at 08:02

## 2025-05-07 RX ADMIN — HEPARIN SODIUM 5000 UNITS: 5000 INJECTION INTRAVENOUS; SUBCUTANEOUS at 05:53

## 2025-05-07 RX ADMIN — DEXAMETHASONE 4 MG: 4 TABLET ORAL at 13:55

## 2025-05-07 RX ADMIN — INSULIN LISPRO 8 UNITS: 100 INJECTION, SOLUTION INTRAVENOUS; SUBCUTANEOUS at 12:22

## 2025-05-07 RX ADMIN — ALLOPURINOL 300 MG: 300 TABLET ORAL at 08:01

## 2025-05-07 RX ADMIN — INSULIN LISPRO 6 UNITS: 100 INJECTION, SOLUTION INTRAVENOUS; SUBCUTANEOUS at 17:05

## 2025-05-07 RX ADMIN — SODIUM BICARBONATE 100 ML/HR: 84 INJECTION, SOLUTION INTRAVENOUS at 21:55

## 2025-05-07 RX ADMIN — INSULIN GLARGINE 10 UNITS: 100 INJECTION, SOLUTION SUBCUTANEOUS at 21:56

## 2025-05-07 RX ADMIN — INSULIN LISPRO 4 UNITS: 100 INJECTION, SOLUTION INTRAVENOUS; SUBCUTANEOUS at 08:01

## 2025-05-07 RX ADMIN — CHLORHEXIDINE GLUCONATE 0.12% ORAL RINSE 15 ML: 1.2 LIQUID ORAL at 08:01

## 2025-05-07 RX ADMIN — QUETIAPINE FUMARATE 12.5 MG: 25 TABLET ORAL at 12:22

## 2025-05-07 RX ADMIN — SODIUM BICARBONATE 125 ML/HR: 84 INJECTION, SOLUTION INTRAVENOUS at 06:17

## 2025-05-07 RX ADMIN — LEUCOVORIN CALCIUM 25 MG: 50 INJECTION, POWDER, LYOPHILIZED, FOR SOLUTION INTRAMUSCULAR; INTRAVENOUS at 01:29

## 2025-05-07 RX ADMIN — ATORVASTATIN CALCIUM 20 MG: 20 TABLET, FILM COATED ORAL at 08:01

## 2025-05-07 RX ADMIN — HEPARIN SODIUM 5000 UNITS: 5000 INJECTION INTRAVENOUS; SUBCUTANEOUS at 21:56

## 2025-05-07 RX ADMIN — LEUCOVORIN CALCIUM 25 MG: 50 INJECTION, POWDER, LYOPHILIZED, FOR SOLUTION INTRAMUSCULAR; INTRAVENOUS at 08:03

## 2025-05-07 RX ADMIN — DEXAMETHASONE 4 MG: 4 TABLET ORAL at 21:56

## 2025-05-07 NOTE — ASSESSMENT & PLAN NOTE
Patient has baseline creatinine of 0.9 creatinine is running slightly above baseline but improved and is 1.3 today.  He is on IV fluids there may be some subtle prerenal azotemia.  He is on continuous isotonic fluids with bicarb urine pH has been alkalinized so no need to continue to follow urinalysis.  UA is no protein no blood so there is really no significant intrinsic process.    Continuing IV fluids with bicarbonate but reduce rate to 100 cc/h

## 2025-05-07 NOTE — PROGRESS NOTES
Progress Note - Infectious Disease   Name: Alexis Dennison 64 y.o. male I MRN: [id number]  Unit/Bed#: [facility] 904-01 I Date of Admission: 3/29/2025   Date of Service: 5/7/2025 I Hospital Day: 39    Assessment & Plan  Sepsis ([facility])  Fever, HR.  Due to bacteremia, UTI.  No other appreciable source of infection.  ROS and exam otherwise benign..  Hemodynamically stable and nontoxic.  Resolved.    Continue to follow closely off antibiotics  Follow temperatures closely  Supportive care as per the primary service  E coli bacteremia  Due to UTI as below.  No other appreciable source.  LFTs stable.  CT A/P unremarkable.  Consider PICC infection, patinet self-removed.  Low suspicion for CNS infection given stable albeit fluctuating mental status.  Status post 7 days IV antibiotics.    Continue to follow closely off antibiotics  E. coli UTI  Pyuria, recent external catheter.  No evidence for retention.  Unreliable historian in terms of symptoms.  Status post 7 days IV antibiotics    Continue to follow closely off antibiotics  Follow UOP closely  Leukopenia  Likely due to chemotherapy.  No kerwin neutropenia.    Recheck WBC in AM to monitor counts  Primary CNS lymphoma  Diagnosed by LP, brain biopsy.  Complicated by obstructive hydrocephalus, status post EVD 4/13 through 4/19.  Status post Rituxan, high-dose methotrexate, and intrathecal cytarabine on 4/17.  Ongoing chemo.  LETY (acute kidney injury) ([facility])  Likely multifactorial.  Overall improving.  Encephalopathy  Multifactorial due to underlying CNS lymphoma, medications, sepsis and infection as above.  Stable.  Type 2 diabetes mellitus with hyperglycemia, without long-term current use of insulin ([facility])  Lab Results   Component Value Date    HGBA1C 6.6 (H) 02/22/2025   Risk factor for infection  Constipation  Risk factor for bacteremia.      The patient is stable from an ID standpoint  We will sign off for now.  Please call with new questions.    Antibiotics:  Off antibiotics  #2    Subjective   Patient sleeping and not awakened.  Remains on 1:1.  No events noted.  No fevers or diarrhea documented.  WBC improving today.    Objective :  Temp:  [98.3 °F (36.8 °C)-98.6 °F (37 °C)] 98.6 °F (37 °C)  HR:  [55-66] 55  BP: (114-147)/(66-80) 147/79  Resp:  [13-17] 17  SpO2:  [97 %-99 %] 98 %  O2 Device: None (Room air)    General:  No acute distress  Psychiatric:  Sleeping  Pulmonary:  Normal respiratory excursion without accessory muscle use  Abdomen:  Soft, nondistended  Extremities:  No edema  Skin:  No rashes      Lab Results: I have reviewed the following results:  Results from last 7 days   Lab Units 05/07/25  0443 05/05/25  0439 05/03/25  1555   WBC Thousand/uL 3.11* 2.45* 3.01*   HEMOGLOBIN g/dL 8.7* 9.9* 9.4*   PLATELETS Thousands/uL 213 191 146*     Results from last 7 days   Lab Units 05/07/25  0443 05/06/25  0500 05/05/25  0439   SODIUM mmol/L 138 142 145   POTASSIUM mmol/L 4.3 4.4 4.1   CHLORIDE mmol/L 99 105 104   CO2 mmol/L 34* 32 33*   BUN mg/dL 40* 36* 28*   CREATININE mg/dL 1.36* 1.58* 1.36*   EGFR ml/min/1.73sq m 54 45 54   CALCIUM mg/dL 8.4 8.5 8.2*   AST U/L  --   --  114*   ALT U/L  --   --  149*   ALK PHOS U/L  --   --  54   ALBUMIN g/dL  --   --  3.0*

## 2025-05-07 NOTE — ASSESSMENT & PLAN NOTE
Lab Results   Component Value Date    CREATININE 1.36 (H) 05/07/2025    CREATININE 1.58 (H) 05/06/2025    CREATININE 1.36 (H) 05/05/2025     Baseline creatinine around 0.9, peak creatinine 2.7  Patient was evaluated by nephrology during this hospital stay and given improvement of creatinine with IV fluids signed off on 4/25.  Reconsulted on 5/2 for monitoring post methotrexate  Creatinine has remained stable  Continue to monitor BMP.  Avoid nephrotoxins

## 2025-05-07 NOTE — ASSESSMENT & PLAN NOTE
Lab Results   Component Value Date    HGBA1C 6.6 (H) 02/22/2025       Recent Labs     05/06/25  2131 05/07/25  0714 05/07/25  1053 05/07/25  1549   POCGLU 258* 240* 313* 250*       Blood Sugar Average: Last 72 hrs:    Continue Lantus 10 units at night  Lispro 8 units 3 times daily  Metformin 1000 mg daily

## 2025-05-07 NOTE — ASSESSMENT & PLAN NOTE
Lab Results   Component Value Date    HGBA1C 6.6 (H) 02/22/2025       Recent Labs     05/06/25  1541 05/06/25  2131 05/07/25  0714 05/07/25  1053   POCGLU 283* 258* 240* 313*       Blood Sugar Average: Last 72 hrs:  (P) 234

## 2025-05-07 NOTE — PLAN OF CARE
Problem: PAIN - ADULT  Goal: Verbalizes/displays adequate comfort level or baseline comfort level  Description: Interventions:- Encourage patient to monitor pain and request assistance- Assess pain using appropriate pain scale- Administer analgesics based on type and severity of pain and evaluate response- Implement non-pharmacological measures as appropriate and evaluate response- Consider cultural and social influences on pain and pain management- Notify physician/advanced practitioner if interventions unsuccessful or patient reports new pain  Outcome: Progressing     Problem: INFECTION - ADULT  Goal: Absence or prevention of progression during hospitalization  Description: INTERVENTIONS:- Assess and monitor for signs and symptoms of infection- Monitor lab/diagnostic results- Monitor all insertion sites, i.e. indwelling lines, tubes, and drains- Monitor endotracheal if appropriate and nasal secretions for changes in amount and color- [place] appropriate cooling/warming therapies per order- Administer medications as ordered- Instruct and encourage patient and family to use good hand hygiene technique- Identify and instruct in appropriate isolation precautions for identified infection/condition  Outcome: Progressing     Problem: SAFETY ADULT  Goal: Patient will remain free of falls  Description: INTERVENTIONS:- Educate patient/family on patient safety including physical limitations- Instruct patient to call for assistance with activity - Consult OT/PT to assist with strengthening/mobility - Keep Call bell within reach- Keep bed low and locked with side rails adjusted as appropriate- Keep care items and personal belongings within reach- Initiate and maintain comfort rounds- Make Fall Risk Sign visible to staff- Offer Toileting every  Hours, in advance of need- Initiate/Maintain alarm- Obtain necessary fall risk management equipment: - Apply yellow socks and bracelet for high fall risk patients- Consider moving  patient to room near nurses station  Outcome: Progressing  Goal: Maintain or return to baseline ADL function  Description: INTERVENTIONS:-  Assess patient's ability to carry out ADLs; assess patient's baseline for ADL function and identify physical deficits which impact ability to perform ADLs (bathing, care of mouth/teeth, toileting, grooming, dressing, etc.)- Assess/evaluate cause of self-care deficits - Assess range of motion- Assess patient's mobility; develop plan if impaired- Assess patient's need for assistive devices and provide as appropriate- Encourage maximum independence but intervene and supervise when necessary- Involve family in performance of ADLs- Assess for home care needs following discharge - Consider OT consult to assist with ADL evaluation and planning for discharge- Provide patient education as appropriate  Outcome: Progressing  Goal: Maintains/Returns to pre admission functional level  Description: INTERVENTIONS:- Perform AM-PAC 6 Click Basic Mobility/ Daily Activity assessment daily.- Set and communicate daily mobility goal to care team and patient/family/caregiver. - Collaborate with rehabilitation services on mobility goals if consulted- Perform Range of Motion  times a day.- Reposition patient every  hours.- Dangle patient  times a day- Stand patient  times a day- Ambulate patient  times a day- Out of bed to chair  times a day - Out of bed for meals  times a day- Out of bed for toileting- Record patient progress and toleration of activity level   Outcome: Progressing     Problem: DISCHARGE PLANNING  Goal: Discharge to home or other facility with appropriate resources  Description: INTERVENTIONS:- Identify barriers to discharge w/patient and caregiver- Arrange for needed discharge resources and transportation as appropriate- Identify discharge learning needs (meds, wound care, etc.)- Arrange for interpretive services to assist at discharge as needed- Refer to Case Management Department for  coordinating discharge planning if the patient needs post-hospital services based on physician/advanced practitioner order or complex needs related to functional status, cognitive ability, or social support system  Outcome: Progressing

## 2025-05-07 NOTE — ASSESSMENT & PLAN NOTE
Baseline Cr 0.9  Recently 1.3 improved from before  Isotonic saline and bicarb per nephro  - Management at discretion of nephro   - Recommend optimal mgmt during rehab process as well   - Monitor BUN/Cr intermittently as well as electrolytes   - Limit nephrotoxic agents when possible  - Optimal BP mgmt

## 2025-05-07 NOTE — CASE MANAGEMENT
Case Management Discharge Planning Note    Patient name Alexis Dennison  Location [facility] 904/[facility] 904-01 MRN [id number]  : 1960 Date 2025       Current Admission Date: 3/29/2025  Current Admission Diagnosis:Primary CNS lymphoma   Patient Active Problem List    Diagnosis Date Noted Date Diagnosed    Leukopenia 2025     At risk for acid-base imbalance 2025     Electrolyte imbalance risk 2025     E. coli UTI 2025     Sepsis (HCC) 2025     E coli bacteremia 2025     Metabolic alkalosis 2025     LETY (acute kidney injury) (HCC) 2025     Goals of care, counseling/discussion 2025     Palliative care encounter 2025     Primary CNS lymphoma 2025     Encephalopathy 2025     Constipation 2025     Ambulatory dysfunction 2025     Thyroid nodule 2025     Pulmonary nodule 2025     Liver lesion 2025     Primary central nervous system (CNS) lymphoma 2025     Type 2 diabetes mellitus with hyperglycemia, without long-term current use of insulin (HCC) 2022     HTN, goal below 130/80 2022     Mixed hyperlipidemia 2022     Vitamin D deficiency 2022     NAFLD (nonalcoholic fatty liver disease) 2022       LOS (days): 39  Geometric Mean LOS (GMLOS) (days): 11.1  Days to GMLOS:-27.6     OBJECTIVE:  Risk of Unplanned Readmission Score: 41.35         Current admission status: Inpatient   Preferred Pharmacy:   [facility]/pharmacy #[id number] - [place] - [address]  [address]  [facility] [address]  Phone: [phone] Fax: [phone]    [facility] - [place], [place] - [address]  [address]  [facility] [address]  Phone: [phone] Fax: [phone]    [facility] - [place], [place] - [address]  [address] [facility]  [address]  [place] [address]  Phone: [phone] Fax:  [phone]    [facility] - [place], [place] - [address]  [address] [facility] [address]  Phone: [phone] Fax: [phone]    [facility]) #[id number] [place], [place] - [address] [address]  Phone: [phone] Fax: [phone]    Primary Care Provider: PATI Mckeon    Primary Insurance: BLUE CROSS  Secondary Insurance: CAPITAL    DISCHARGE DETAILS:            Other Referral/Resources/Interventions Provided:  Referral Comments: [facility] can admit today. They placed a 1730 transfer to room 964. Report x [phone].  Have auth good till 5/12. Zeus from [facility] has auth details. SLIM updated. TC to daughter Elizabeth [phone] & informed her. Agreeable to plan. RN updated.  Confirmed with AURORA Schulz aware of oncology needs while at rehab.

## 2025-05-07 NOTE — ASSESSMENT & PLAN NOTE
Non-traumatic brain injury - CNS lymphoma  - C/b hydrocephalus now improved; leptomeningeal and IV seeding   - EVD placement and removal;  - Brain bx on 4/14. Bx results + for large B-cell lymphoma with FISH testing pending.    - H-O consulted and he has remained on high dose steroids and started on High-dose MTX [id number] (C1 received 4/18, C2 received 5/2) and Rituximab weekly x8 weeks (1st dose Thursday 4/24).       - Status and residual impairments:   Impaired cognition (orientation, comprehension, problem solving, and memory); impaired safety awareness requiring 1:1  Intermittent agitation improved from earlier   Impaired balance, coordination  - Med/surgical management:              Mgmt per HO              Decadron 4mg Q8h - mgmt per H-O               High-dose MTX W4lcmrd (C1 receiveed 4/18, C2 planned for 5/1) x 4 weeks followed by every 4 weeks maintenance and Rituximab weekly x8 weeks (1st dose Thursday 4/24, 2nd 5/3).                 Monitor neuro exam, labs, vitals closely   Leucovorin rescue and MTX level monitoring per H-O  - Neuropsych Med review:  Reasonable to continue current regimen except would trial Zyprexa 2.5mg PRN first if needed              Decadron 4mg Q8h  Seroquel 12.5mg qnoon and 50mg QHS              Melatonin 6mg HS              Zyprexa 5mg IM BID PRN > not needed since 5/1 > consider 2.5mg IM or additional PO Seroquel      - Continue PT, OT in acute setting to improve ADLs, mobility, strength, conditioning, and decrease risks of immobility as well as to assist with dispo recommendations  - High fall precautions; Continue in-person 1:1 for now with sleep-wake-behavioral log > wean as indicated  - Optimal sleep-wake-behavioral mgmt  - Frequent re-orientation, reassurance and if needed redirection  - Family access as indicated   - Decrease risks of complications related to decreased mobility status and monitor for them during course  - MSK - atrophy, contractures, bone demineralization,  CV - DVT/PE, orthostasis, decreased CO, Resp - atelectasis, PNA, GI - constipation, reduced appetite,  - retention, incontinence, Skin - pressure injuries  - Swallowing/intake monitoring - Monitor wakefulness, lethargy, and swallowing - Hold if lethargic, too confused, or new signs of dysphagia and follow-up SLP as indicated; if inadequate PO hydration/nutrition adjust mgmt accordingly at discretion of primary team  - If available in acute setting, recommend cognitive therapy with SLP and OT along with SLP/OT in acute rehab setting  - Optimal bowel/bladder mgmt/hygiene - monitor for retention, incontinence, infection     - Turn patient Q2H, skin checks minimum Qshift  - ROM of major joints 2-3 times per day  (if unable to do it on own)  - Supportive counseling and updates to family (as appropriate)   - Fall precautions - if needed increase rounding or consider virtual sitter or in-person sitter  - Overall mgmt per primary team currently, recommend optimal mgmt during rehab process as well  - Remains at risk for increased confusion/delirium, restlessness, agitation, and fall - continue to monitor for concerns/changes  - Monitor neuro-exam, wakefulness, mood, cognition, insight into deficits and safety awareness   - Monitor and ensure optimal management electrolytes, nutrition, and hydration  - Monitor for signs or symptoms of infection, medication intolerances, other systemic etiologies  - Additional labs, imaging, specialist follow-up as needed per primary team currently   - Overstimulation precautions, frequent re-orientation, re-direction, re-assurance  - Optimal mood, pain, and sleep management  - If impaired sleep or behavior recommend sleep log and agitation monitoring    - Limit sedating medications when possible  - For routine restlessness, anxiety, irritability focus on non-pharmacologic management    - Hold benzo's with increased risk paradoxical reaction and possibility of limiting cognitive  recovery    Prior Level of Function and Social history:    The patient lives in a two level home with 1/2 bath on the main level. He was independent and lives with his spouse and children.     Current Level of Function:    Mod assist LBD; Min assist UBD  Transfers supervision; ambulation 150 ft min assist     Disposition recommendation:  [facility]  Patient is good candidate for transfer to [facility] pending:    - Medical clearance/stability  - Facility acceptance, insurance authorization, and bed availability  - Significant changes (worsening or significant improvements) in functional status, in the interim, that would warrant dispo to different location    The patient is expected to adequately participate in (tolerate either 3 hours of therapy per day at least 5 days per week or 15 hours of therapy over 7 day period) and benefit significantly from intensive level of therapies offered in acute rehabilitation setting.  The patient is also expected to benefit from increased nursing oversight and medical management not available in subacute/SNF setting.

## 2025-05-07 NOTE — H&P
H&P - Hospitalist   Name: Alexis Dennison 64 y.o. male I MRN: [id number]  Unit/Bed#: -01 I Date of Admission: 5/7/2025   Date of Service: 5/7/2025 I Hospital Day: 0     Assessment & Plan  Primary central nervous system (CNS) lymphoma  Daily serum methotrexate levels until level is less than 0.1  Dexamethasone taper ordered  Maintain PICC line  Type 2 diabetes mellitus with hyperglycemia, without long-term current use of insulin (HCC)  Lab Results   Component Value Date    HGBA1C 6.6 (H) 02/22/2025       Recent Labs     05/06/25  2131 05/07/25  0714 05/07/25  1053 05/07/25  1549   POCGLU 258* 240* 313* 250*       Blood Sugar Average: Last 72 hrs:    Continue Lantus 10 units at night  Lispro 8 units 3 times daily  Metformin 1000 mg daily  HTN, goal below 130/80  Blood pressure at goal without antihypertensive medication will continue to hold  Mixed hyperlipidemia  Continue atorvastatin  Thyroid nodule  Follow-up endocrinology outpatient  Pulmonary nodule  CT chest 3 months follow-up  Liver lesion  Patient will require outpatient follow-up  Ambulatory dysfunction  Management per PMR  Fall precaution  Encephalopathy  Improving  Continue Seroquel  Continue steroid taper  LETY (acute kidney injury) (HCC)  Monitor status post methotrexate  At baseline  Follow-up intermittently with BMP  Sepsis (HCC)  Resolved  Continue to monitor off antibiotics  E coli bacteremia  Patient completed 7 days of IV cefazolin which was finished on May 5  E. coli UTI  Completed antibiotics as above    History of Present Illness   Alexis Dennison is a 64 y.o. male with a PMH of hypertension, hyperlipidemia, diabetes who presented to the [facility] primary history of persistent lymphoma.  Patient was just hospitalized for encephalopathy due to primary CNS lymphoma.  Patient on a dexamethasone taper.  Patient received methotrexate during his hospitalization and will need continuing monitoring.  Patient now admitted to  inpatient acute rehab.  Patient has no complaints at this time.    Review of Systems   Constitutional:  Negative for chills and fever.   HENT:  Negative for ear pain and sore throat.    Eyes:  Negative for pain and visual disturbance.   Respiratory:  Negative for cough and shortness of breath.    Cardiovascular:  Negative for chest pain and palpitations.   Gastrointestinal:  Negative for abdominal pain and vomiting.   Genitourinary:  Negative for dysuria and hematuria.   Musculoskeletal:  Negative for arthralgias and back pain.   Skin:  Negative for color change and rash.   Neurological:  Negative for seizures and syncope.   All other systems reviewed and are negative.    Historical Information   Past Medical History:   Diagnosis Date    Colon polyp     Diabetes mellitus (HCC)     GERD (gastroesophageal reflux disease)     Hyperlipidemia     Hypertension     Liver disease     Sleep apnea      Past Surgical History:   Procedure Laterality Date    COLONOSCOPY      CYST REMOVAL      back    FL LUMBAR PUNCTURE DIAGNOSTIC  3/31/2025    FL LUMBAR PUNCTURE DIAGNOSTIC  4/7/2025    CA EXC B9 LESION MRGN XCP SK TG T/A/L 0.6-1.0 CM N/A 6/7/2023    Procedure: EXCISION  BIOPSY LESION/MASS ABDOMINAL/CHEST WALL;  Surgeon: Trevor Villavicencio MD;  Location:  MAIN OR;  Service: General    THIRD VENTRICULOSTOMY Left 4/14/2025    Procedure: Left frontal endoscopic biopsy of ventricular mass and placement of EVD;  Surgeon: Paul Causey MD;  Location:  MAIN OR;  Service: Neurosurgery     Social History     Tobacco Use    Smoking status: Never    Smokeless tobacco: Never   Vaping Use    Vaping status: Never Used   Substance and Sexual Activity    Alcohol use: Yes     Alcohol/week: 1.0 standard drink of alcohol     Types: 1 Cans of beer per week     Comment: socially    Drug use: Never    Sexual activity: Not Currently     Partners: Female     E-Cigarette/Vaping    E-Cigarette Use Never User      E-Cigarette/Vaping Substances     "Nicotine No     THC No     CBD No     Flavoring No     Other No     Unknown No      Family history non-contributory    Objective :  Temp:  [98 °F (36.7 °C)-98.4 °F (36.9 °C)] 98 °F (36.7 °C)  HR:  [55-66] 62  BP: (118-147)/(68-79) 118/70  Resp:  [17-18] 18  SpO2:  [97 %-99 %] 97 %  O2 Device: None (Room air)    Wt Readings from Last 1 Encounters:   05/07/25 75.9 kg (167 lb 6.4 oz)     Estimated body mass index is 24.02 kg/m² as calculated from the following:    Height as of 5/2/25: 5' 10\" (1.778 m).    Weight as of this encounter: 75.9 kg (167 lb 6.4 oz).    Physical Exam  Vitals reviewed.   HENT:      Head: Normocephalic.      Nose: No congestion.      Mouth/Throat:      Pharynx: No oropharyngeal exudate or posterior oropharyngeal erythema.   Eyes:      Conjunctiva/sclera: Conjunctivae normal.   Cardiovascular:      Rate and Rhythm: Normal rate and regular rhythm.   Pulmonary:      Effort: Pulmonary effort is normal.   Abdominal:      General: Abdomen is flat.      Palpations: Abdomen is soft.   Skin:     General: Skin is warm and dry.   Neurological:      Mental Status: He is alert and oriented to person, place, and time. Mental status is at baseline.           Lab Results: I have reviewed the following results:  Results from last 7 days   Lab Units 05/07/25  0443 05/05/25  0439 05/03/25  1555   HEMOGLOBIN g/dL 8.7* 9.9* 9.4*   HEMATOCRIT % 25.3* 28.8* 26.1*   WBC Thousand/uL 3.11* 2.45* 3.01*     Results from last 7 days   Lab Units 05/07/25  0443 05/06/25  0500 05/05/25  0439   BUN mg/dL 40* 36* 28*   SODIUM mmol/L 138 142 145   POTASSIUM mmol/L 4.3 4.4 4.1   CHLORIDE mmol/L 99 105 104   CREATININE mg/dL 1.36* 1.58* 1.36*   AST U/L  --   --  114*   ALT U/L  --   --  149*            Imaging Results Review: I reviewed radiology reports from this admission including: CT abdomen/pelvis.  Other Study Results Review: No additional pertinent studies reviewed.    Review of Scheduled Meds:  Current Facility-Administered " Medications   Medication Dose Route Frequency Provider Last Rate    acetaminophen  650 mg Oral Q6H PRN Crispin Arana MD      [START ON 5/8/2025] allopurinol  300 mg Oral Daily Crispin Arana MD      alteplase  2 mg Intracatheter Q1MIN PRN Crispin Arana MD      aluminum-magnesium hydroxide-simethicone  30 mL Oral Q4H PRN Crispin Arana MD      [START ON 5/8/2025] atorvastatin  20 mg Oral Daily Crispin Arana MD      [START ON 5/8/2025] bisacodyl  10 mg Rectal Daily Crispin Arana MD      calcium carbonate  1,000 mg Oral Daily PRN Crispin Arana MD      chlorhexidine  15 mL Mouth/Throat Q12H [facility] Crispin Arana MD      dexamethasone  4 mg Oral Q12H [facility] Crispin Arana MD      Followed by    [START ON 5/11/2025] dexamethasone  2 mg Oral Q12H [facility] Crispin Arana MD      Followed by    [START ON 5/15/2025] dexamethasone  1 mg Oral Q12H [facility] Crispin Arana MD      heparin (porcine)  5,000 Units Subcutaneous Q8H CAIT Crispin Arana MD      insulin glargine  10 Units Subcutaneous HS Crispin Arana MD      insulin lispro  2-12 Units Subcutaneous 4x Daily (AC & HS) Crispin Arana MD      [START ON 5/8/2025] insulin lispro  8 Units Subcutaneous TID With Meals Crispin Arana MD      leucovorin 25 mg in dextrose 5 % 50 mL IVPB  25 mg Intravenous Q6H Crispin Arana MD      melatonin  6 mg Oral HS Crispin Arana MD      metFORMIN  1,000 mg Oral Daily Crispin Arana MD      OLANZapine  5 mg Intramuscular BID PRN Crispin Arana MD      ondansetron  4 mg Intravenous Q6H PRN Crispin Arana MD      oxyCODONE  2.5 mg Oral Q4H PRN Crispin Arana MD      Or    oxyCODONE  5 mg Oral Q4H PRN Crispin Arana MD      [START ON 5/8/2025] pantoprazole  40 mg Oral Early Morning Crispin Arana MD      [START ON 5/8/2025] QUEtiapine  12.5 mg Oral Daily Crispin Arana MD      [START ON 5/8/2025] QUEtiapine  50 mg Oral HS Crispin Arana MD      senna  2 tablet Oral HS Crispin Arana MD      sodium bicarbonate 100 mEq in dextrose  5 % 1,000 mL infusion  100 mL/hr Intravenous Continuous Crispin Arana MD      sodium chloride  20 mL/hr Intravenous Once PRN Crispin Arana MD         Code Status: Level 1 - Full Code    Administrative Statements   I have spent a total time of 77 minutes in caring for this patient on the day of the visit/encounter including Diagnostic results, Prognosis, Risks and benefits of tx options, Patient and family education, Risk factor reductions, Impressions, Documenting in the medical record, and Obtaining or reviewing history  .  ** Please Note:  voice to text software may have been used in the creation of this document. Although proof errors in transcription or interpretation are a potential of such software**

## 2025-05-07 NOTE — PROGRESS NOTES
Called report to Nancy on [facility] in [place].  Pt being discharged with picc line.  Picc flushed with ease and had brisk blood return on both lines.  Pt to leave with 1:1.  All belongings will be sent with pt.

## 2025-05-07 NOTE — PLAN OF CARE
Problem: PAIN - ADULT  Goal: Verbalizes/displays adequate comfort level or baseline comfort level  Description: Interventions:- Encourage patient to monitor pain and request assistance- Assess pain using appropriate pain scale- Administer analgesics based on type and severity of pain and evaluate response- Implement non-pharmacological measures as appropriate and evaluate response- Notify physician/advanced practitioner if interventions unsuccessful or patient reports new pain  Outcome: Progressing     Problem: SAFETY ADULT  Goal: Maintain or return to baseline ADL function  Description: INTERVENTIONS:-  Assess patient's ability to carry out ADLs; assess patient's baseline for ADL function and identify physical deficits which impact ability to perform ADLs (bathing, care of mouth/teeth, toileting, grooming, dressing, etc.)- Assess/evaluate cause of self-care deficits - Assess range of motion- Assess patient's mobility; develop plan if impaired- Assess patient's need for assistive devices and provide as appropriate- Encourage maximum independence but intervene and supervise when necessary- Involve family in performance of ADLs- Assess for home care needs following discharge - Consider OT consult to assist with ADL evaluation and planning for discharge- Provide patient education as appropriate  Outcome: Progressing     Problem: NEUROSENSORY - ADULT  Goal: Achieves stable or improved neurological status  Description: INTERVENTIONS- Monitor and report changes in neurological status- Monitor vital signs such as temperature, blood pressure, glucose, and any other labs ordered - Initiate measures to prevent increased intracranial pressure- Monitor for seizure activity and implement precautions if appropriate    INTERVENTIONS- Monitor and report changes in neurological status- Monitor vital signs such as temperature, blood pressure, glucose, and any other labs ordered - Initiate measures to prevent increased intracranial  pressure- Monitor for seizure activity and implement precautions if appropriate    Outcome: Progressing

## 2025-05-07 NOTE — ASSESSMENT & PLAN NOTE
Hemoglobin A1c 6.6% on 2/22/2025    Recent Labs     05/07/25  0443 05/07/25  0714 05/07/25  1053 05/07/25  1549   POCGLU  --  240* 313* 250*   GLUC 266*  --   --   --        PTA metformin 1000 mg daily, holding  On Lantus 10 units at bedtime; can increase to 12u glargine qHS when at [facility] if on insulins, otherwise resume home regimen  Accu-Cheks reviewed and prandial glucose above goal  Hyperglycemic secondary to steroid use  Increase lispro to 10 units 3 times daily when at [facility].  Otherwise can resume home regimen if needed if not on insulin  Continue insulin sliding scale  Hypoglycemia protocol

## 2025-05-07 NOTE — DISCHARGE SUMMARY
"Discharge Summary - Hospitalist   Name: Alexis Dennison 64 y.o. male I MRN: [id number]  Unit/Bed#: [facility] 904-01 I Date of Admission: 3/29/2025   Date of Service: 5/7/2025 I Hospital Day: 39     Assessment & Plan  Primary CNS lymphoma  Patient with development of worsening confusion, recently seen at the [facility] ED on 3/24/2025 with CT head showing brain lesion   MRI of the brain 3/26/2025 concerning for \"multiple scattered areas of subependymoma nodular enhancement throughout ventricles with mild hydrocephalus left worse than right, scattered nodular areas of enhancement in left anterior inferior basal ganglia involving left anterior commissure, and smaller foci of nodular enhancement along anteromedial aspect of the left cerebral peduncle and left quirino.\"  With brain compression  (minimal left to right shift), mild hydrocephalus as evidenced by initial imaging  S/p LP on 3/31. 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  Lymphoma/leukemia panel nondiagnostic  CT head on 4/3 with interval increase in ventricular caliber concerning for worsening hydrocephalus. Repeat LP on 4/7 was again undiagnostic.   MRI of the brain from 4/11-\"Interval enlargement of the dominant left anterior basal ganglionic mass lesion with greater surrounding vasogenic edema and mass effect. 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  S/p EVD, self removed over 4/26 weekend  Completed keppra 500mg BID x 7 days for postoperative seizure ppx per neurosurgery    Plan:  Patient started on chemotherapy while inpatient.  Discussed with oncology and plan for rituximab infusion weekly and methotrexate every 2 weeks.  Patient will require daily serum methotrexate levels until less than 0.1.  Also recommend dexamethasone taper as follows:4 mg Q8H through 5/5, then 4 mg BID x4 days, then 2 mg BID " x4 days, then 1 mg BID x4 days, then 1 mg daily x4 days, then discontinue.   Maintain PICC line  Nephrology & heme-onc monitoring urine pH for urine alkalinization with intermittent bicarb drip   Patient medically stable for rehab.  Insurance Auth obtained, awaiting bed  Encephalopathy  Improving  Multifactorial in the setting of brain mass, vasogenic edema, medication induced and hospital-acquired delirium    Plan:  Continue Seroquel 12.5 mg AM/25 mg qHS  On steroid taper as above  Continue delirium precautions  Patient on 1:1 for safety  Sepsis (HCC)  Resolved   As evidenced on 4/29 morning with temperature and tachycardia.  Worsening kidney function.  Lactate within normal limits  S/p 1 L bolus, continue IV fluids  Elevated procalcitonin in the setting of recent rituximab  Secondary to UTI, bacteremia  UA with innumerable bacteria and large leukocytes  Urine culture with E. coli  Blood culture with 1 out of 2 with E. coli  S/p 7 days IV cefazolin ended 5/5  E coli bacteremia  Blood cultures with 1 out of 2 with E. Coli  CT abdomen pelvis on 4/30 with no acute pathology  Status post 7 days IV cefazolin that ended 5/5  Type 2 diabetes mellitus with hyperglycemia, without long-term current use of insulin ([facility])  Hemoglobin A1c 6.6% on 2/22/2025    Recent Labs     05/07/25  0443 05/07/25  0714 05/07/25  1053 05/07/25  1549   POCGLU  --  240* 313* 250*   GLUC 266*  --   --   --        PTA metformin 1000 mg daily, holding  On Lantus 10 units at bedtime; can increase to 12u glargine qHS when at [facility] if on insulins, otherwise resume home regimen  Accu-Cheks reviewed and prandial glucose above goal  Hyperglycemic secondary to steroid use  Increase lispro to 10 units 3 times daily when at [facility].  Otherwise can resume home regimen if needed if not on insulin  Continue insulin sliding scale  Hypoglycemia protocol  E. coli UTI  As above  Constipation  Abdomen x-ray on 4/29 with large colonic stool burden without obstruction  CT  abdomen pelvis on 4/30 with constipation  S/p enema and suppository  Resolved  Plan:  Continue bowel regimen  Ambulate patient as able  LETY (acute kidney injury) (HCC)  Lab Results   Component Value Date    CREATININE 1.36 (H) 05/07/2025    CREATININE 1.58 (H) 05/06/2025    CREATININE 1.36 (H) 05/05/2025     Baseline creatinine around 0.9, peak creatinine 2.7  Patient was evaluated by nephrology during this hospital stay and given improvement of creatinine with IV fluids signed off on 4/25.  Reconsulted on 5/2 for monitoring post methotrexate  Creatinine has remained stable  Continue to monitor BMP.  Avoid nephrotoxins  Ambulatory dysfunction  Plan for acute rehab - remains stable   Fall precautions  Ambulate patient TID as tolerated  Mixed hyperlipidemia  Continue statin  HTN, goal below 130/80  PTA losartan and hydrochlorothiazide, held on admission  BP reviewed and acceptable without medications  Liver lesion  MRI obtained: No suspicious hepatic lesions.  There is a simple right hepatic lobe cyst.  Mild diffuse hepatic steatosis.  LFTs stable  Outpatient follow up advised  Thyroid nodule  Incidental findings on CT scan  Lab Results   Component Value Date    ERV1MXOVMMPA 0.019 (L) 04/21/2025    FREET4 1.13 (H) 04/21/2025       Nonemergent outpatient endocrine follow-up.  No need for ultrasound given patient has hyperthyroid appearance of recent labs.  Will require outpatient confirmation of TSH, free T4, plus minus further imaging with endocrine such as radioactive uptake  Pulmonary nodule  Imaging with 4 mm right lower lobe pulmonary nodule.  CT chest 3-month follow-up  Leukopenia  Due to chemotherapy, without neutropneia  Routine CBC to monitor      Medical Problems       Resolved Problems  Date Reviewed: 4/14/2025          Resolved    Abnormal CT scan 3/30/2025     Resolved by  Gabriele Fair MD    Foot erythema 4/14/2025     Resolved by  PATI Garcia        Discharging Physician /  Practitioner: Sandee Fuentes MD  PCP: PATI Mckeon  Admission Date:   Admission Orders (From admission, onward)       Ordered        03/29/25 2259  INPATIENT ADMISSION  Once                          Discharge Date: 05/07/25    Consultations During Hospital Stay:  Neurosurgery  Medical oncology  ICU  Podiatry  PMR  Palliative care  Nephrology    Procedures Performed:   Intubation 4/19  Laceration repair 4/19    Significant Findings / Test Results:   3/26/25 MRI brain w wo: Multiple scattered foci of subependymal nodular enhancement throughout ventricles (left worse than right) with mild hydrocephalus (left worse than right), scattered nodular areas of enhancement in left anterior inferior basal ganglia involving left   anterior commissure, and smaller foci of nodular enhancement along anteromedial aspect of left cerebral peduncle and left quirino. Differential includes lymphoma, metastasis, multicentric high-grade glioma with subependymal spread of tumor, among other   differentials. Recommend neurosurgical consultation for further evaluation. Consider correlation with CSF analysis.    4/3 CTH noncon: Redemonstrated hyperattenuating subependymal masses better characterized on recent MR. Interval increased ventricular caliber, left greater than right in comparison to recent CT dated 3/29/2025 concerning for worsening hydrocephalus. Recommend   neurosurgical consultation.    4/20/25 MRI brain BT w wo: Within the confines of a motion-degraded examination:     1.  Decreased enhancement and perfusion metrics associated with the left gangliocapsular mass (biopsy-proven lymphoma), noting decreased mass effect on the lateral ventricles and decreased transependymal flow of CSF. The change from MRI dated 4/11/2025   could be related to medical therapy. See narrative above for full discussion.  2.  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  3.  There is a 6 mm T2 hyperintense posterior  pituitary lesion, which could reflect a Rathke's cleft cyst/other cystic pituitary lesion. This could be further evaluated on nonemergent MRI pituitary protocol.    Incidental Findings:   CTA chest/abdomen/pelvis was performed and revealed 4mm R lower lobe pulmonary nodule, 12mm hypodense liver lesion, and heterogenous nodular enhance of the left thyroid lobe    Family aware of findings    Test Results Pending at Discharge (will require follow up):   N/a     Outpatient Tests Requested:  N/a    Complications:  none    Reason for Admission: Confusion, found to have worsening hydrocephalus, CNS lymphoma    Hospital Course:   Alexis Dennison is a 64 y.o. male patient who originally presented to the hospital on 3/29/2025 due to evaluation for brain metastases.  Patient was seen in the ED several days prior to admission and informed that he had a brain mass.  MRI several days later confirmed this.  This was followed by worsening confusion, decreased ability to speak, forgetfulness, and this is what prompted family to bring the patient in for further assessment and care.    Patient underwent LP on 3/31, which was suspicious for CNS lymphoma, but lymphoma/leukemia CSF panel was nondiagnostic.  Patient was recommended to undergo repeat high-volume LP, performed on 4/7 and was nondiagnostic as well.    4/11/25 MRI showed multiple left-sided scattered foci of subependymal nodular enhancement throughout ventricles, left greater than right, with mild hydrocephalus left greater than right, scattered nodular enhancement in left anterior inferior basal ganglia involving left anterior commissure, and smaller foci of nodular enhancement along anteromedial aspect of left cerebral peduncle and left quirino.  Essentially, he had interval enlargement of dominant left anterior basal ganglionic mass with greater surrounding vasogenic edema and mass effect with midline shift.    Patient also had EVD on 4/13/2025 for obstructive hydrocephalus as he  was requested to be evaluated by neuro ICU due to worsening mental status and confusion.  EVD was placed at the bedside after obtaining CT head that showed worsening hydrocephalus.  For vasogenic edema, he was treated with dexamethasone while in the ICU.  There was consideration of EEG for his altered mental status, but as explained by worsening hydrocephalus this was canceled.  Patient underwent brain biopsy on 4/14 left frontal endoscopic, with replacement of EVD.  Pathology revealed large B-cell lymphoma.    Patient was seen by heme-onc, who initiated high dose methotrexate and Rituxan as an inpatient.  This is to be continued at time of discharge with PICC in place for ongoing chemotherapies that will be administered with heme-onc's assistance while at acute rehab.  His last dose of intrathecal cytarabine was on 4/17. nephrology was consulted for LETY that was suspected to be due to relative hypotension and recent methotrexate, treated with bicarb drip to keep urine pH over 7.5.  Infectious disease was consulted after patient met sepsis criteria, which was in the setting of bacteremia and UTI.  Blood cultures grew E. coli on 4/29/2025 following 1 of 2 cultures.This was treated with 7 days of IV cefazolin.    By several days leading up to discharge, patient was showing remarkable signs of improvement in mentation and was able to communicate near his baseline prior to admission.  He was still slightly confused and sometimes tangential/off topic, but was otherwise answering all review of systems appropriately and was aware of his plan for ongoing chemotherapy at rehab.      Please see above list of diagnoses and related plan for additional information.     Condition at Discharge: good    Discharge Day Visit / Exam:   Subjective:  No complaints. Denies fevers/chills, chest pain, shortness of breath, heart palpitations, nausea, vomiting, abdominal pain, weakness, or numbness.    Vitals: Blood Pressure: 135/68  "(05/07/25 1428)  Pulse: 60 (05/07/25 1428)  Temperature: 98.4 °F (36.9 °C) (05/07/25 1428)  Temp Source: Oral (05/05/25 0247)  Respirations: 17 (05/07/25 0311)  Height: 5' 10\" (177.8 cm) (05/02/25 1900)  Weight - Scale: 75.5 kg (166 lb 7.2 oz) (05/02/25 1900)  SpO2: 98 % (05/07/25 1428)  Physical Exam  Vitals reviewed.   Constitutional:       General: He is not in acute distress.  HENT:      Head: Normocephalic and atraumatic.      Mouth/Throat:      Mouth: Mucous membranes are moist.      Pharynx: Oropharynx is clear.   Eyes:      General: No scleral icterus.     Extraocular Movements: Extraocular movements intact.   Cardiovascular:      Rate and Rhythm: Normal rate and regular rhythm.      Heart sounds: No murmur heard.     No friction rub. No gallop.   Pulmonary:      Effort: Pulmonary effort is normal. No respiratory distress.      Breath sounds: No stridor. No wheezing or rales.   Abdominal:      General: There is no distension.      Palpations: There is no mass.      Tenderness: There is no abdominal tenderness. There is no guarding.   Musculoskeletal:         General: Normal range of motion.   Skin:     General: Skin is warm and dry.      Findings: No rash.   Neurological:      Mental Status: He is alert.   Psychiatric:         Thought Content: Thought content normal.      Comments: Flat mood and affect          Discussion with Family: Updated  (friend) at bedside.    Discharge instructions/Information to patient and family:   See after visit summary for information provided to patient and family.      Provisions for Follow-Up Care:  See after visit summary for information related to follow-up care and any pertinent home health orders.      Mobility at time of Discharge:   Basic Mobility Inpatient Raw Score: 17  JH-HLM Goal: 5: Stand one or more mins  JH-HLM Achieved: 1: Laying in bed  HLM Goal NOT achieved. Continue to encourage mobility in post discharge setting.     Disposition:   Acute Rehab " at [facility]    Planned Readmission: no    Discharge Medications:  See after visit summary for reconciled discharge medications provided to patient and/or family.      Administrative Statements   Discharge Statement:  I have spent a total time of 45 minutes in caring for this patient on the day of the visit/encounter. >30 minutes of time was spent on: Diagnostic results, Risks and benefits of tx options, Instructions for management, Patient and family education, Importance of tx compliance, Risk factor reductions, Impressions, Counseling / Coordination of care, Documenting in the medical record, Reviewing / ordering tests, medicine, procedures  , and Communicating with other healthcare professionals .    **Please Note: This note may have been constructed using a voice recognition system**

## 2025-05-07 NOTE — PROGRESS NOTES
"PHYSICAL MEDICINE AND REHABILITATION   PREADMISSION ASSESSMENT     Projected IGC and Rehabilitation Diagnoses:  Impairment of mobility, safety, Activities of Daily Living (ADLs), and cognitive/communication skills due to Brain Dysfunction:  02.1  Non-Traumatic  Etiologic: Primary CNS lymphoma   Date of Onset: 3/29/25    Date of surgery: 4/15/25 Left frontal endoscopic biopsy of ventricular mass and replacement of EVD     PATIENT INFORMATION  Name: Alexis Dennison Phone #: [phone] (M)   Address: [address] [place] [id number]  YOB: 1960 Age: 64 y.o. #   Marital Status: /Civil Union  Ethnicity:    Employment Status: currently employed  Extended Emergency Contact Information  Primary Emergency Contact: Elizabeth Chester  Address: [address] [place] [address] [address]  Home Phone: [phone]  Relation: Daughter  Secondary Emergency Contact: Guy Fisher \"Naldo\"  Mobile Phone: [phone]  Relation: Spouse  Preferred language: Mandarin Chinese   needed? Yes  Advance Directive: Level 1 Full code ( No advance directive on file )     INSURANCE/COVERAGE:     Primary Payor: BLUE CROSS / Plan: MISC BLUE CROSS / Product Type: Blue Fee for Service /   Secondary Payer: [facility]    Payer Contact: see below  Payer Contact: n/a    Contact Phone: see below  Contact Phone: n/a      Approved Authorization Number: [id number]  Start of Care Date: 05/07/25  Next Review Date: 05/12/25  Submit Next Review to: F: [phone]    Benefits: Ded $0, OOPM $2320.00 remaining, copay $1000.00 PER  ADM, coins 0.     Medical Record #: [id number]    REFERRAL SOURCE:   Referring provider: Sandee Fuentes MD  Referring facility: [facility]  Room: [facility]-  PCP: PATI Mckeon PCP phone number: [phone]    MEDICAL INFORMATION  HPI:Alexis is a 64 y.o. male with a medical history of but not limited " to  diabetes, GERD, HLD, HTN, liver disease and sleep apnea who presented to the [facility] on 3/29/2025 with increased confusion in the setting of recently discovered brain mass. He was noted by family to have increasing confusion over the past several month with forgetfulness. Initial work up was negative however was seen at [facility] ER on 3/24 where CT head showed a brain lesion and MRI was recommended. He had progression in confusion prompting presentation. 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 CTA C/A/P showed a right lower lobe pulmonary nodule. Non-specific hypodense lesion within the hepatic lobe possibly representing hemangioma however metastatic lesion could not be excluded. MRI of the abdomen showed no suspicious hepatic lesions, demonstrated simple right hepatic lobe cyst. 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 Repeat CT head on 4/3 showed increased ventricular caliber concerning for worsening hydrocephalus. MRI on 4/11 showed progression of disease from 3/30 to 4/11 with interval enlargement of the dominant left anterior basal ganglionic mass with greater surrounding vasogenic edema and mass effect and persistent leptomeningeal and intraventricular seeding with obstructive hydrocephalus--current differential lymphoma versus high grade glioma less likely metastasis. On 4/13 he had continued deterioration in mental status and repeat CT showed left sided obstructive hydrocephalus from lesion located adjacent to formamen of [place], similar to CT on 4/3; blood products within occiptial horn of left lateral ventricle similar to MRI 4/11; left anterior  basal ganglia mass with surrounding edema, regional mass effect and effacement of left suprasellar cistern, similar to MRI 4/11. He required intubation and was admitted to ICU. EVD was placed by neurosurgery and biopsy was done. Repeat CSF studies were done on 4/13 and are pending. He self extubated on 4/14. CT head on 4/14 showed adequate EVD placement with increased left frontal lobe pneumocephalus post biopsy and improved hydrocephalus. 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 H-O consulted and he has remained on high dose steroids and started on High-dose MTX Q 2 weeks and Rituximab weekly x8 weeks (1st dose Thursday 4/24). Patient's mentation and function has been slowly improving with improved hydrocephalus. Course also notable for LETY which has been improving but not back to baseline, intermittent agitated delirium requiring atypical antipsychotic med adjustments, mitts and 1:1 virtual sitter. S/p EVD, self removed on 4/26/25 .Completed keppra 500mg BID x 7 days for postoperative seizure. Per Hem / Onc - plan remains for rituximab infusion weekly and methotrexate every 2 weeks. He will require daily serum methotrexate levels until less than 0.1.  Also recommend dexamethasone taper as follows:4 mg Q8H through 5/5, then 4 mg BID x4 days, then 2 mg BID x4 days, then 1 mg BID x4 days, then 1 mg daily x4 days, then discontinue. PT/OT therapies were consulted and continue to follow patient at this time. PM&R was consulted and are recommending inpatient acute rehab when medically stable. All involved medical disciplines feel/agree patient is medically ready for discharge at this time. Inpatient acute rehabilitation physician was consulted. Upon review of patient's case and correspondence with PT/OT therapies and PM&R services, [facility] physician feels Alexis will benefit and is a good candidate / appropriate for inpatient acute rehab at this time. He has demonstrated the  willingness / desire and tolerance to participate in the required 3 hours or more of therapies per day.       Past Medical History:   Past Surgical History:   Allergies:     Past Medical History:   Diagnosis Date    Colon polyp     Diabetes mellitus (HCC)     GERD (gastroesophageal reflux disease)     Hyperlipidemia     Hypertension     Liver disease     Sleep apnea     Past Surgical History:   Procedure Laterality Date    COLONOSCOPY      CYST REMOVAL      back    FL LUMBAR PUNCTURE DIAGNOSTIC  3/31/2025    FL LUMBAR PUNCTURE DIAGNOSTIC  4/7/2025    ID EXC B9 LESION MRGN XCP SK TG T/A/L 0.6-1.0 CM N/A 6/7/2023    Procedure: EXCISION  BIOPSY LESION/MASS ABDOMINAL/CHEST WALL;  Surgeon: Trevor Villavicencio MD;  Location:  MAIN OR;  Service: General    THIRD VENTRICULOSTOMY Left 4/14/2025    Procedure: Left frontal endoscopic biopsy of ventricular mass and placement of EVD;  Surgeon: Paul Causey MD;  Location:  MAIN OR;  Service: Neurosurgery     No Known Allergies      Medical/functional conditions requiring inpatient rehabilitation: impaired mobility / self care  ; impaired balance / ambulation     Risk for medical/clinical complications: risk for falls ; pain ; vary cognition ; seizures ; skin breakdown ; infection ; DVT    Comorbidities in the last 100 days:     Primary CNS lymphoma  Type 2 diabetes mellitus with hyperglycemia, without long-term current use of insulin (HCC)  HTN, goal below 130/80  Mixed hyperlipidemia  Thyroid nodule  Pulmonary nodule  Liver lesion  Constipation  Ambulatory dysfunction  Encephalopathy  Goals of care, counseling/discussion  Palliative care encounter  LETY (acute kidney injury) (HCC)  Metabolic alkalosis  Sepsis (HCC)  E coli bacteremia  E. coli UTI    CURRENT VITAL SIGNS:   Temp:  [98.4 °F (36.9 °C)-98.6 °F (37 °C)] 98.4 °F (36.9 °C)  HR:  [55-66] 60  BP: (114-147)/(66-79) 135/68  Resp:  [13-17] 17  SpO2:  [97 %-99 %] 98 %  O2 Device: None (Room air)   Intake/Output Summary (Last  24 hours) at 5/7/2025 1531  Last data filed at 5/7/2025 1100  Gross per 24 hour   Intake 810.42 ml   Output 3605 ml   Net -2794.58 ml        LABORATORY RESULTS:      Lab Results   Component Value Date    HGB 8.7 (L) 05/07/2025    HCT 25.3 (L) 05/07/2025    WBC 3.11 (L) 05/07/2025     Lab Results   Component Value Date    BUN 40 (H) 05/07/2025    BUN 18 09/22/2023    K 4.3 05/07/2025    K 4.2 09/22/2023    CL 99 05/07/2025    CL 97 (L) 09/22/2023    CREATININE 1.36 (H) 05/07/2025     Lab Results   Component Value Date    PROTIME 14.2 04/15/2025    INR 1.07 04/15/2025        DIAGNOSTIC STUDIES:  CT head wo contrast  Result Date: 4/3/2025  Impression: Redemonstrated hyperattenuating subependymal masses better characterized on recent MR. Interval increased ventricular caliber, left greater than right in comparison to recent CT dated 3/29/2025 concerning for worsening hydrocephalus. Recommend neurosurgical consultation. The study was marked in EPIC for immediate notification. Workstation performed: [id number]     FL IN-patient lumbar puncture  Result Date: 3/31/2025  Impression: Successful fluoroscopically guided lumbar puncture. Opening pressure: 15 cm of H20 Workstation performed: [id number]     MRI abdomen w wo contrast  Result Date: 3/31/2025  Impression: 1.  No suspicious hepatic lesions. There is a simple right hepatic lobe cyst. 2.  Mild diffuse hepatic steatosis. Workstation performed: [id number]     CT head wo contrast  Result Date: 3/29/2025  Impression: Stable subependymal nodules and left foramen of De Oliveira region hyperdense lesion again seen with associated mild symmetrical dilatation of the left lateral ventricle and minimal left to right midline shift. The overall findings are unchanged compared to the prior study. No acute intracranial hemorrhage or evidence of acute cerebral infarction. Workstation performed: [id number]     CTA chest abdomen pelvis w wo contrast  Result Date: 3/29/2025  Impression: 1.   There is a nonspecific 12 mm hypodense lesion within the right hepatic lobe. While this may represent a hemangioma, a metastatic lesion can not be excluded. This is suboptimally evaluated on the current exam, due to arterial timing of the contrast bolus and background diffuse hepatic steatosis. Further evaluation by gadolinium enhanced MRI of the abdomen is recommended. 2.  Nonspecific 4 mm right lower lobe pulmonary nodule. In the setting of a known malignancy, a 3-month follow-up chest CT could be performed for further evaluation. 3.  Heterogeneous nodular enlargement of the left thyroid lobe. A nonemergent thyroid ultrasound follow-up is recommended. 4.  Please refer to the report body for description of other incidental, chronic and/or benign findings. The study was marked in EPIC for significant notification. Workstation performed: [id number]       PRECAUTIONS/SPECIAL NEEDS:  Tobacco:   Social History     Tobacco Use   Smoking Status Never   Smokeless Tobacco Never   , Alcohol:    Social History     Substance and Sexual Activity   Alcohol Use Yes    Alcohol/week: 1.0 standard drink of alcohol    Types: 1 Cans of beer per week    Comment: socially   , Weight Bearing Precautions:  weight bearing as tolerated, Anticoagulation:  heparin, Blood Sugar Management: as per MD, Edema Management, Safety Concerns, Pain Management, Dietary Restrictions: Consistent carb ; sodium 4 GM ; Lo Phos , Language Preference: English, and Fall precautions     MEDICATIONS:     Current Facility-Administered Medications:     acetaminophen (TYLENOL) tablet 650 mg, 650 mg, Oral, Q6H PRN, Rustam Drummond MD    allopurinol (ZYLOPRIM) tablet 300 mg, 300 mg, Oral, Daily, Rustam Drummond MD, 300 mg at 05/07/25 0801    alteplase (CATHFLO) injection 2 mg, 2 mg, Intracatheter, Q1MIN PRN, Eva Ortiz MD    aluminum-magnesium hydroxide-simethicone (MAALOX) oral suspension 30 mL, 30 mL, Oral, Q4H PRN, Rustam Drummond MD     atorvastatin (LIPITOR) tablet 20 mg, 20 mg, Oral, Daily, Rustam Drummond MD, 20 mg at 05/07/25 0801    bisacodyl (DULCOLAX) rectal suppository 10 mg, 10 mg, Rectal, Daily, Rustam Drummond MD, 10 mg at 05/01/25 1259    calcium carbonate (TUMS) chewable tablet 1,000 mg, 1,000 mg, Oral, Daily PRN, Rustam Drummond MD    chlorhexidine (PERIDEX) 0.12 % oral rinse 15 mL, 15 mL, Mouth/Throat, Q12H CAIT, Rustam Drummond MD, 15 mL at 05/07/25 0801    dexamethasone (DECADRON) tablet 4 mg, 4 mg, Oral, Q8H CAIT, Dionne Huang MD, 4 mg at 05/07/25 1355    heparin (porcine) subcutaneous injection 5,000 Units, 5,000 Units, Subcutaneous, Q8H CAIT, Rustam Drummond MD, 5,000 Units at 05/07/25 1355    insulin glargine (LANTUS) subcutaneous injection 10 Units 0.1 mL, 10 Units, Subcutaneous, HS, Rustam Drummond MD, 10 Units at 05/06/25 2132    insulin lispro (HumALOG/ADMELOG) 100 units/mL subcutaneous injection 2-12 Units, 2-12 Units, Subcutaneous, 4x Daily (AC & HS), 8 Units at 05/07/25 1222 **AND** Fingerstick Glucose (POCT), , , 4x Daily AC and at bedtime, Dionne Huang MD    insulin lispro (HumALOG/ADMELOG) 100 units/mL subcutaneous injection 8 Units, 8 Units, Subcutaneous, TID With Meals, Dionne Huang MD, 8 Units at 05/07/25 1222    leucovorin 25 mg in dextrose 5 % 50 mL IVPB, 25 mg, Intravenous, Q6H, Simba Phillips DO, Last Rate: 210 mL/hr at 05/07/25 1355, 25 mg at 05/07/25 1355    melatonin tablet 6 mg, 6 mg, Oral, HS, Rustam Drummond MD, 6 mg at 05/06/25 1912    OLANZapine (ZyPREXA) IM injection 5 mg, 5 mg, Intramuscular, BID PRN, Dionne Huang MD, 5 mg at 05/01/25 1122    ondansetron (ZOFRAN) injection 4 mg, 4 mg, Intravenous, Q6H PRN, Rustam Drummond MD, 4 mg at 04/14/25 0930    oxyCODONE (ROXICODONE) split tablet 2.5 mg, 2.5 mg, Oral, Q4H PRN **OR** oxyCODONE (ROXICODONE) IR tablet 5 mg, 5 mg, Oral, Q4H PRN, Rustam Drummond MD, 5 mg at  04/29/25 1351    pantoprazole (PROTONIX) EC tablet 40 mg, 40 mg, Oral, Early Morning, Rustam Drummond MD, 40 mg at 05/07/25 0553    QUEtiapine (SEROquel) tablet 12.5 mg, 12.5 mg, Oral, Daily, Reji Kay MD, 12.5 mg at 05/07/25 1222    QUEtiapine (SEROquel) tablet 50 mg, 50 mg, Oral, HS, Rylie Stapleton PA-C, 50 mg at 05/06/25 2132    senna (SENOKOT) tablet 17.2 mg, 2 tablet, Oral, HS, Rustam Drummond MD, 17.2 mg at 05/01/25 2325    sodium bicarbonate 100 mEq in dextrose 5 % 1,000 mL infusion, 100 mL/hr, Intravenous, Continuous, Anup Abraham MD, Last Rate: 100 mL/hr at 05/07/25 1017, 100 mL/hr at 05/07/25 1017    sodium chloride 0.9 % infusion, 20 mL/hr, Intravenous, Once PRN, Magali Lee MD    SKIN INTEGRITY:   Skin noted intact     PRIOR LEVEL OF FUNCTION:  He lives in a(n) single family home  Alexis Dennison is  and lives with their family.  Self Care: Independent, Indoor Mobility: Independent, Stairs (in/outdoor): Independent, and Cognition: Independent    FALLS IN THE LAST 6 MONTHS: 0    HOME ENVIRONMENT:  The living area: bedroom on 2nd floor and bathroom on 2nd floor  There are 3 steps to enter the home.    The patient will have 24 hour supervision/physical assistance available upon discharge.    PREVIOUS DME:  Equipment in home (previous DME): None    FUNCTIONAL STATUS:  Physical Therapy Occupational Therapy Speech Therapy   As per PT:     PT Visit Date 05/05/25   Note Type   Note Type Treatment for insurance authorization   Pain Assessment   Pain Assessment Tool 0-10   Pain Score No Pain   Restrictions/Precautions   Weight Bearing Precautions Per Order No   Other Precautions 1:1;Cognitive;Chair Alarm;Bed Alarm;Fall Risk   General   Chart Reviewed Yes   Response to Previous Treatment Patient with no complaints from previous session.   Family/Caregiver Present No   Cognition   Overall Cognitive Status Impaired   Arousal/Participation Alert;Responsive;Cooperative   Attention Attends with cues  to redirect   Following Commands Follows one step commands without difficulty   Comments pt very pleasant and cooperative, less distractable than previous sessions. more talkative and engaged with therapist   Bed Mobility   Supine to Sit Unable to assess   Sit to Supine Unable to assess   Additional Comments pt found/ left sitting OOB in recliner with all needs within reach- alarm on post session   Transfers   Sit to Stand 5  Supervision   Additional items Verbal cues   Stand to Sit 5  Supervision   Additional items Verbal cues   Additional Comments transfers with RW vs no AD; completes 4x STS t/o session   Ambulation/Elevation   Gait pattern Short stride;Decreased foot clearance   Gait Assistance 4  Minimal assist  (CGA)   Additional items Assist x 1;Verbal cues   Assistive Device Rolling walker;None   Distance 150' x2 with RW; 2 x 60' no AD   Stair Management Assistance Not tested  (would like to trial in upcoming therapy sessions as appropriate/ able)   Balance   Static Sitting Fair +   Dynamic Sitting Fair   Static Standing Fair   Dynamic Standing Fair -   Ambulatory Poor +   Endurance Deficit   Endurance Deficit Yes   Activity Tolerance   Activity Tolerance Patient tolerated treatment well   Medical Staff Made Aware PCA   Nurse Made Aware RN cleared   Exercises   Neuro re-ed pt participating in ring toss + horse shoe activity with CS/ occasional CGA for required for balance. VC required to complete   Balance training  pt participating in hallway scavenger hunt as well as pattern activity with different colored shapes. VC for completness   Assessment   Prognosis Good   Problem List Decreased strength;Decreased endurance;Decreased mobility;Decreased coordination;Impaired balance;Decreased cognition;Impaired judgement;Decreased safety awareness   Assessment Pt seen for PT treatment session this date. Therapy session focused on functional transfers, gait training and endurance training in order to improve overall  mobility and independence. Pt requires assist of 1 for all mobility performed this date. Pt making steady progress toward goals as demonstrated by engaging in multiple balance activities as well as cognitive tasks as well as walking community distances. Continues to have improved balance with RW, however would like to continue to progress to LRAD. Continues to require cues for increasing strength length and widening base of support. Able to follow multiple step commands this date, with less distraction than previous sessions. Pt was left sitting OOB in recliner at the end of PT session with all needs in reach. Pt would benefit from continued PT services while in hospital to address remaining limitations. PT to continue to follow pt and recommends level 1. The patient's AM-PAC Basic Mobility Inpatient Short Form Raw Score is 17. A Raw score of greater than 16 suggests the patient may benefit from discharge to home. Please also refer to the recommendation of the Physical Therapist for safe discharge planning.    As per OT:     OT Visit Date 05/05/25   Note Type   Note Type Treatment for insurance authorization   Pain Assessment   Pain Assessment Tool 0-10   Pain Score No Pain   Restrictions/Precautions   Weight Bearing Precautions Per Order No   Other Precautions 1:1;Bed Alarm;Cognitive;Chair Alarm;Fall Risk;Pain   Lifestyle   Autonomy I w/ ADLs, I w/ IADLs, I w/ functional mobility and transfers   Reciprocal Relationships Wife and children   Service to Others Full time employment   Intrinsic Gratification Reading   ADL   Where Assessed Edge of bed   UB Dressing Assistance 4  Minimal Assistance   UB Dressing Deficit Setup;Verbal cueing;Supervision/safety;Increased time to complete;Thread RUE;Thread LUE;Pull around back   UB Dressing Comments Required assist to pull robe around back in stance, VC to thread BUE   LB Dressing Assistance 3  Moderate Assistance   LB Dressing Deficit Setup;Verbal  "cueing;Supervision/safety;Increased time to complete;Thread RLE into pants;Thread LLE into pants;Pull up over hips;Fasteners   LB Dressing Comments Required assist to pull over hips in stance. Pt able to tie waist band given VC.  Pt able to achieve figure 4 given Max VC for sequencing to doff/don socks at EOB.   Functional Standing Tolerance   Time 3 minutes   Comments Pt tolerates standing ~3 minutes w/ Close S to maintain standing balance while completing UB/LB dressing tasks.   Bed Mobility   Supine to Sit 5  Supervision   Additional items HOB elevated;Bedrails;Verbal cues   Sit to Supine Unable to assess   Additional Comments Pt greeted supine and left OOB in chair w/ alarm on and 1:1 present, all needs in reach   Transfers   Sit to Stand 5  Supervision   Additional items Verbal cues   Stand to Sit 5  Supervision   Additional items Verbal cues   Additional Comments x1 w/ RW, x1 no AD   Functional Mobility   Functional Mobility 4  Minimal assistance  (CGA)   Additional Comments Pt completes long household distance mobility CGA w/ RW vs no AD. No overt LOB. Requires VC for directions/sequencing.   Additional items Rolling walker   Subjective   Subjective \"I'm in room 904\"   Cognition   Overall Cognitive Status Impaired   Arousal/Participation Alert;Cooperative   Attention Attends with cues to redirect   Orientation Level Oriented to person;Disoriented to place;Disoriented to time;Disoriented to situation   Memory Decreased recall of precautions;Decreased recall of recent events;Decreased short term memory;Decreased recall of biographical information   Following Commands Follows one step commands without difficulty   Comments pt is pleasant and cooperative. Able to follow one step commands w/out difficutly however requires freqent repetition of cues. Requires VC for sequencing/initiation of tasks, however once started Pt is able to complete w/ less cueing. Becomes more talkative and engaged w/ time/activity. "   Activity Tolerance   Activity Tolerance Patient limited by fatigue;Treatment limited secondary to medical complications (Comment)  (cognition)   Medical Staff Made Aware RN cleared   Assessment   Assessment Pt seen for skilled OT treatment session on this date w/ interventions focusing on ADL participation, transfer skills, and fxnl mobility. Pt was agreeable and willing to participate in session. Pt engaged in the following tasks: Supervision bed mobility, transers; CGA functional mobility; Min A UB dressing; Mod A LB dressing. In comparison to previous session, pt demonstrated improvements in functional mobility w/out AD. Pt required frequent re direction, verbal cues for safety, verbal cues for correct technique, cognitive assistance to anticipate next step, and one step directives. Pt continues to be functioning below baseline level as occupational performance remains limited by decreased ADL status, decreased activity tolerance, decreased endurance, decreased standing balance, decreased transfer skills, decreased fxnl mobility, decreased safety awareness, decreased insight into deficits, and cognition .  From OT standpoint, recommend Level I (Maximum Resource Intensity) at time of d/c. Pt will benefit from continued OT treatment while in acute care to address deficits as defined above and maximize level of functional independence with ADLs and functional mobility. Pt left OOB in chair w/ alarm on and all needs within reach at end of session.    N/A      CARE SCORES:  Self Care:  Eatin: Supervision or touching  assistance  Oral hygiene: 03: Partial/moderate assistance  Shower/bathing self: 02: Substantial/maximal assistance  Upper body dressin: Partial/moderate assistance  Lower body dressin: Substantial/maximal assistance  Putting on/taking off footwear: 02: Substantial/maximal assistance  Toilet hygiene: 03: Partial/moderate assistance   Transfers:  Roll left and right: 03: Partial/moderate  assistance  Sit to lyin: Partial/moderate assistance  Lying to sitting on side of bed: 03: Partial/moderate assistance  Sit to stand: 03: Partial/moderate assistance  Chair/bed to chair transfer: 03: Partial/moderate assistance  Toilet transfer: 03: Partial/moderate assistance  Mobility:  Walk 10 ft: 03: Partial/moderate assistance  Walk 50 ft with two turns: 03: Partial/moderate assistance  Walk 150ft: 03: Partial/moderate assistance    CURRENT GAP IN FUNCTION  Prior to Admission: Functional Status: Patient was independent with mobility/ambulation, transfers, ADL's, IADL's.    Expected functional outcomes: It is expected that with skilled acute rehabilitation services the patient will progress to Supervision for self care and Supervision for mobility     Estimated length of stay: 10 to 14 days    Anticipated Post-Discharge Disposition/Treatment  Disposition: Return to previous home/apartment.  Outpatient Services: Physical Therapy (PT) and Occupational Therapy (OT)    BARRIERS TO DISCHARGE  Weakness, Pain, Diminished cognition/Mentation change, Balance Difficulty, Dizziness, Fatigue, Caregiver Accessibility, and Equipment Needs    INTERVENTIONS FOR DISCHARGE  Adaptive equipment, Patient/Family/Caregiver Education, Arrange DME needs, Medication Changes per MD, Therapy exercises, Center of balance support , and Energy conservation education     REQUIRED THERAPY:  Patient will require PT and OT 90 minutes each per day, five days per week to achieve rehab goals.     REQUIRED FUNCTIONAL AND MEDICAL MANAGEMENT FOR INPATIENT REHABILITATION:  Pain Management: Overall pain is well controlled, Deep Vein Thrombosis (DVT) Prophylaxis:  SCD's while in bed and   Nursing education and bowel/bladder management, internal medicine to manage/monitor medical conditions, PM&R to maximize function and provide medical oversight, PT/OT intervention, patient/family education/training, and any consults as needed     RECOMMENDED LEVEL  OF CARE:   Alexis is a 64 y.o. male with a medical history of but not limited to  diabetes, GERD, HLD, HTN, liver disease and sleep apnea who presented to the [facility] on 3/29/2025 with increased confusion in the setting of recently discovered brain mass. He was noted by family to have increasing confusion over the past several month with forgetfulness. Initial work up was negative however was seen at [facility] ER on 3/24 where CT head showed a brain lesion and MRI was recommended. He had progression in confusion prompting presentation. 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 CTA C/A/P showed a right lower lobe pulmonary nodule. Non-specific hypodense lesion within the hepatic lobe possibly representing hemangioma however metastatic lesion could not be excluded. MRI of the abdomen showed no suspicious hepatic lesions, demonstrated simple right hepatic lobe cyst. 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 Repeat CT head on 4/3 showed increased ventricular caliber concerning for worsening hydrocephalus. MRI on 4/11 showed progression of disease from 3/30 to 4/11 with interval enlargement of the dominant left anterior basal ganglionic mass with greater surrounding vasogenic edema and mass effect and persistent leptomeningeal and intraventricular seeding with obstructive hydrocephalus--current differential lymphoma versus high grade glioma less likely metastasis. On 4/13 he had continued deterioration in mental status and repeat CT showed left sided obstructive hydrocephalus from lesion located adjacent to formamen of [place], similar to CT on 4/3; blood products within  occiptial horn of left lateral ventricle similar to MRI 4/11; left anterior basal ganglia mass with surrounding edema, regional mass effect and effacement of left suprasellar cistern, similar to MRI 4/11. He required intubation and was admitted to ICU. EVD was placed by neurosurgery and biopsy was done. Repeat CSF studies were done on 4/13 and are pending. He self extubated on 4/14. CT head on 4/14 showed adequate EVD placement with increased left frontal lobe pneumocephalus post biopsy and improved hydrocephalus. 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 H-O consulted and he has remained on high dose steroids and started on High-dose MTX Q 2 weeks and Rituximab weekly x8 weeks (1st dose Thursday 4/24). Patient's mentation and function has been slowly improving with improved hydrocephalus. Course also notable for LETY which has been improving but not back to baseline, intermittent agitated delirium requiring atypical antipsychotic med adjustments, mitts and 1:1 virtual sitter. S/p EVD, self removed on 4/26/25 .Completed keppra 500mg BID x 7 days for postoperative seizure. Per Hem / Onc - plan remains for rituximab infusion weekly and methotrexate every 2 weeks. He will require daily serum methotrexate levels until less than 0.1.  Also recommend dexamethasone taper as follows:4 mg Q8H through 5/5, then 4 mg BID x4 days, then 2 mg BID x4 days, then 1 mg BID x4 days, then 1 mg daily x4 days, then discontinue. PT/OT therapies were consulted and continue to follow patient at this time. PM&R was consulted and are recommending inpatient acute rehab when medically stable. All involved medical disciplines feel/agree patient is medically ready for discharge at this time. Inpatient acute rehabilitation physician was consulted. Upon review of patient's case and correspondence with PT/OT therapies and PM&R services, [facility] physician feels Alexis will benefit and is a good candidate /  appropriate for inpatient acute rehab at this time. He has demonstrated the willingness / desire and tolerance to participate in the required 3 hours or more of therapies per day. Prior to admission / hospital stay Alexis was independent with all ADLs /functional mobility / iADLs. Currently with PT therapies /  OT therapies : Upper body ADLs sup- min A ; min-mod A with lower body ADLs.Nursing is being recommended for medication distribution / management , bowel / bladder management and educational purposes , internal medicine to continue to monitor and manage medical conditions ,PM&R to maximize  function and provide medical oversight, and inpatient rehab to maximize self care ,mobility ,strength , and endurance upon discharge to home.

## 2025-05-07 NOTE — QUICK NOTE
Discussed with Dr. Dumont ; patient s/p Methotrexate  MTX Levels   5/3/25 <0.10  5/5/25 0.36   5/6/25 came back 0.13     Urine PH 5/6/25 1652 was 7.0     On Sodium Bicarb 100 mEq in D5% 1000 mL 100 cc/hr -- will increase to 125 cc/hr , extended Q6h Leucovorin 25 mg . UA Q6H and daily MTX level. Goal is MTX level <0.1 mmol/L     In and out and daily weight.   Medical oncology continues to follow on above. Please reach with any questions or concerns.     Simba Phillips, DO   Hematology and Medical Oncology - PGY V  [facility]   .

## 2025-05-07 NOTE — PROGRESS NOTES
Progress Note - Nephrology   Name: Alexis Dennison 64 y.o. male I MRN: [id number]  [facility] Date of Admission: 3/29/2025   Date of Service: 5/7/2025 I Hospital Day: 39     Assessment & Plan  LETY (acute kidney injury) (HCC)    Patient has baseline creatinine of 0.9 creatinine is running slightly above baseline but improved and is 1.3 today.  He is on IV fluids there may be some subtle prerenal azotemia.  He is on continuous isotonic fluids with bicarb urine pH has been alkalinized so no need to continue to follow urinalysis.  UA is no protein no blood so there is really no significant intrinsic process.    Continuing IV fluids with bicarbonate but reduce rate to 100 cc/h    Primary CNS lymphoma    On chemotherapy.  Still on 1: 1 observation.      Subjective     The patient is awake and alert says he is tolerating his medications he really has no complaints says he feels a little bit better today.    No fever chills no headache  No nausea vomiting diarrhea he ate his breakfast  No urinary complaints  No chest pain or shortness of breath    Objective :  Temp:  [98.3 °F (36.8 °C)-98.6 °F (37 °C)] 98.6 °F (37 °C)  HR:  [55-66] 55  BP: (114-147)/(66-80) 147/79  Resp:  [13-17] 17  SpO2:  [97 %-99 %] 98 %  O2 Device: None (Room air)    Current Weight: Weight - Scale: 75.5 kg (166 lb 7.2 oz)  First Weight: Weight - Scale: 86.2 kg (190 lb)  I/O         05/05 0701  05/06 0700 05/06 0701  05/07 0700 05/07 0701  05/08 0700    P.O. 120 530     I.V. (mL/kg)  597.9 (7.9)     IV Piggyback 100 52.5     Total Intake(mL/kg) 220 (2.9) 1180.4 (15.6)     Urine (mL/kg/hr) 3000 (1.7) 3405 (1.9)     Stool       Total Output 3000 3405     Net -2780 -2224.6                  Physical Exam  Constitutional:       General: He is not in acute distress.     Appearance: He is not toxic-appearing.   HENT:      Head: Normocephalic.      Comments: Scratch on his scalp     Mouth/Throat:      Mouth: Mucous membranes are dry.   Eyes:       Extraocular Movements: Extraocular movements intact.   Cardiovascular:      Rate and Rhythm: Normal rate and regular rhythm.      Heart sounds:      No gallop.   Pulmonary:      Effort: Pulmonary effort is normal. No respiratory distress.      Breath sounds: No wheezing or rales.   Abdominal:      General: Bowel sounds are normal. There is no distension.      Palpations: Abdomen is soft.      Tenderness: There is no abdominal tenderness.   Neurological:      Mental Status: He is alert.         Medications:    Current Facility-Administered Medications:     acetaminophen (TYLENOL) tablet 650 mg, 650 mg, Oral, Q6H PRN, Rustam Drummond MD    allopurinol (ZYLOPRIM) tablet 300 mg, 300 mg, Oral, Daily, Rustam Drummond MD, 300 mg at 05/07/25 0801    alteplase (CATHFLO) injection 2 mg, 2 mg, Intracatheter, Q1MIN PRN, Eva Ortiz MD    aluminum-magnesium hydroxide-simethicone (MAALOX) oral suspension 30 mL, 30 mL, Oral, Q4H PRN, Rustam Drummond MD    atorvastatin (LIPITOR) tablet 20 mg, 20 mg, Oral, Daily, Rustam Drummond MD, 20 mg at 05/07/25 0801    bisacodyl (DULCOLAX) rectal suppository 10 mg, 10 mg, Rectal, Daily, Rustam Drummond MD, 10 mg at 05/01/25 1259    calcium carbonate (TUMS) chewable tablet 1,000 mg, 1,000 mg, Oral, Daily PRN, Rustam Drummond MD    chlorhexidine (PERIDEX) 0.12 % oral rinse 15 mL, 15 mL, Mouth/Throat, Q12H CAIT, Rustam Drummond MD, 15 mL at 05/07/25 0801    dexamethasone (DECADRON) tablet 4 mg, 4 mg, Oral, Q8H CAIT, Dionne Huang MD, 4 mg at 05/07/25 0553    heparin (porcine) subcutaneous injection 5,000 Units, 5,000 Units, Subcutaneous, Q8H [facility], Rustam Drummond MD, 5,000 Units at 05/07/25 0553    insulin glargine (LANTUS) subcutaneous injection 10 Units 0.1 mL, 10 Units, Subcutaneous, HS, Rustam Drummond MD, 10 Units at 05/06/25 2132    insulin lispro (HumALOG/ADMELOG) 100 units/mL subcutaneous injection 2-12 Units,  2-12 Units, Subcutaneous, 4x Daily (AC & HS), 4 Units at 05/07/25 0801 **AND** Fingerstick Glucose (POCT), , , 4x Daily AC and at bedtime, Dionne Huang MD    insulin lispro (HumALOG/ADMELOG) 100 units/mL subcutaneous injection 8 Units, 8 Units, Subcutaneous, TID With Meals, Dionne Huang MD, 8 Units at 05/07/25 0802    leucovorin 25 mg in dextrose 5 % 50 mL IVPB, 25 mg, Intravenous, Q6H, Simba Phillips DO, Last Rate: 210 mL/hr at 05/07/25 0803, 25 mg at 05/07/25 0803    melatonin tablet 6 mg, 6 mg, Oral, HS, Rustam Drummond MD, 6 mg at 05/06/25 1912    OLANZapine (ZyPREXA) IM injection 5 mg, 5 mg, Intramuscular, BID PRN, Dionne Huang MD, 5 mg at 05/01/25 1122    ondansetron (ZOFRAN) injection 4 mg, 4 mg, Intravenous, Q6H PRN, Rustam Drummond MD, 4 mg at 04/14/25 0930    oxyCODONE (ROXICODONE) split tablet 2.5 mg, 2.5 mg, Oral, Q4H PRN **OR** oxyCODONE (ROXICODONE) IR tablet 5 mg, 5 mg, Oral, Q4H PRN, Rustam Drummond MD, 5 mg at 04/29/25 1351    pantoprazole (PROTONIX) EC tablet 40 mg, 40 mg, Oral, Early Morning, Rustam Drummond MD, 40 mg at 05/07/25 0553    QUEtiapine (SEROquel) tablet 12.5 mg, 12.5 mg, Oral, Daily, Reji Kay MD, 12.5 mg at 05/06/25 1228    QUEtiapine (SEROquel) tablet 50 mg, 50 mg, Oral, HS, Rylie Stapleton PA-C, 50 mg at 05/06/25 2132    senna (SENOKOT) tablet 17.2 mg, 2 tablet, Oral, HS, Rustam Drummond MD, 17.2 mg at 05/01/25 2325    sodium bicarbonate 100 mEq in dextrose 5 % 1,000 mL infusion, 125 mL/hr, Intravenous, Continuous, Simba Phillips DO, Last Rate: 125 mL/hr at 05/07/25 0617, 125 mL/hr at 05/07/25 0617    sodium chloride 0.9 % infusion, 20 mL/hr, Intravenous, Once PRN, Magali Lee MD      Lab Results: I have reviewed the following results:  Results from last 7 days   Lab Units 05/07/25  0443 05/06/25  0500 05/05/25  0439 05/04/25  0533 05/03/25  1555 05/03/25  0521 05/02/25  0544 05/01/25  0612 04/30/25  1304 04/30/25  1144   WBC  "Thousand/uL 3.11*  --  2.45*  --  3.01*  --  3.51*  3.51* 4.62  --  5.69   HEMOGLOBIN g/dL 8.7*  --  9.9*  --  9.4*  --  10.1*  10.1* 10.2*  --  11.3*   HEMATOCRIT % 25.3*  --  28.8*  --  26.1*  --  28.2*  28.2* 29.6*  --  32.4*   PLATELETS Thousands/uL 213  --  191  --  146*  --  110*  110* 77*  --  67*   POTASSIUM mmol/L 4.3 4.4 4.1 3.8  --  3.7 4.0 5.3   < >  --    CHLORIDE mmol/L 99 105 104 102  --  103 102 100   < >  --    CO2 mmol/L 34* 32 33* 33*  --  30 31 28   < >  --    BUN mg/dL 40* 36* 28* 24  --  30* 32* 42*   < >  --    CREATININE mg/dL 1.36* 1.58* 1.36* 1.27  --  1.52* 1.37* 1.47*   < >  --    CALCIUM mg/dL 8.4 8.5 8.2* 7.8*  --  7.8* 8.4 8.2*   < >  --    MAGNESIUM mg/dL 2.0  --   --   --   --   --   --   --   --   --    PHOSPHORUS mg/dL 3.2  --  3.0 2.5  --  2.7  --   --   --   --    ALBUMIN g/dL  --   --  3.0*  --   --   --   --   --   --   --     < > = values in this interval not displayed.       Administrative Statements     Portions of the record may have been created with voice recognition software. Occasional wrong word or \"sound a like\" substitutions may have occurred due to the inherent limitations of voice recognition software. Read the chart carefully and recognize, using context, where substitutions have occurred.If you have any questions, please contact the dictating provider.  "

## 2025-05-07 NOTE — PROGRESS NOTES
Progress Note - PMR   Name: Alexis Dennison 64 y.o. male I MRN: [id number]  Unit/Bed#: [facility] 904-01 I Date of Admission: 3/29/2025   Date of Service: 5/7/2025 I Hospital Day: 39     Assessment & Plan  Primary CNS lymphoma  Non-traumatic brain injury - CNS lymphoma  - C/b hydrocephalus now improved; leptomeningeal and IV seeding   - EVD placement and removal;  - Brain bx on 4/14. Bx results + for large B-cell lymphoma with FISH testing pending.    - H-O consulted and he has remained on high dose steroids and started on High-dose MTX [id number] (C1 received 4/18, C2 received 5/2) and Rituximab weekly x8 weeks (1st dose Thursday 4/24).       - Status and residual impairments:   Impaired cognition (orientation, comprehension, problem solving, and memory); impaired safety awareness requiring 1:1  Intermittent agitation improved from earlier   Impaired balance, coordination  - Med/surgical management:              Mgmt per HO              Decadron 4mg Q8h - mgmt per H-O               High-dose MTX [id number] (C1 receiveed 4/18, C2 planned for 5/1) x 4 weeks followed by every 4 weeks maintenance and Rituximab weekly x8 weeks (1st dose Thursday 4/24, 2nd 5/3).                 Monitor neuro exam, labs, vitals closely   Leucovorin rescue and MTX level monitoring per H-O  - Neuropsych Med review:  Reasonable to continue current regimen except would trial Zyprexa 2.5mg PRN first if needed              Decadron 4mg Q8h  Seroquel 12.5mg qnoon and 50mg QHS              Melatonin 6mg HS              Zyprexa 5mg IM BID PRN > not needed since 5/1 > consider 2.5mg IM or additional PO Seroquel      - Continue PT, OT in acute setting to improve ADLs, mobility, strength, conditioning, and decrease risks of immobility as well as to assist with dispo recommendations  - High fall precautions; Continue in-person 1:1 for now with sleep-wake-behavioral log > wean as indicated  - Optimal sleep-wake-behavioral mgmt  - Frequent re-orientation, reassurance  and if needed redirection  - Family access as indicated   - Decrease risks of complications related to decreased mobility status and monitor for them during course  - MSK - atrophy, contractures, bone demineralization, CV - DVT/PE, orthostasis, decreased CO, Resp - atelectasis, PNA, GI - constipation, reduced appetite,  - retention, incontinence, Skin - pressure injuries  - Swallowing/intake monitoring - Monitor wakefulness, lethargy, and swallowing - Hold if lethargic, too confused, or new signs of dysphagia and follow-up SLP as indicated; if inadequate PO hydration/nutrition adjust mgmt accordingly at discretion of primary team  - If available in acute setting, recommend cognitive therapy with SLP and OT along with SLP/OT in acute rehab setting  - Optimal bowel/bladder mgmt/hygiene - monitor for retention, incontinence, infection     - Turn patient Q2H, skin checks minimum Qshift  - ROM of major joints 2-3 times per day  (if unable to do it on own)  - Supportive counseling and updates to family (as appropriate)   - Fall precautions - if needed increase rounding or consider virtual sitter or in-person sitter  - Overall mgmt per primary team currently, recommend optimal mgmt during rehab process as well  - Remains at risk for increased confusion/delirium, restlessness, agitation, and fall - continue to monitor for concerns/changes  - Monitor neuro-exam, wakefulness, mood, cognition, insight into deficits and safety awareness   - Monitor and ensure optimal management electrolytes, nutrition, and hydration  - Monitor for signs or symptoms of infection, medication intolerances, other systemic etiologies  - Additional labs, imaging, specialist follow-up as needed per primary team currently   - Overstimulation precautions, frequent re-orientation, re-direction, re-assurance  - Optimal mood, pain, and sleep management  - If impaired sleep or behavior recommend sleep log and agitation monitoring    - Limit sedating  medications when possible  - For routine restlessness, anxiety, irritability focus on non-pharmacologic management    - Hold benzo's with increased risk paradoxical reaction and possibility of limiting cognitive recovery    Prior Level of Function and Social history:    The patient lives in a two level home with 1/2 bath on the main level. He was independent and lives with his spouse and children.     Current Level of Function:    Mod assist LBD; Min assist UBD  Transfers supervision; ambulation 150 ft min assist     Disposition recommendation:  [facility]/IRF  Patient is good candidate for transfer to [facility]/IRF pending:    - Medical clearance/stability  - Facility acceptance, insurance authorization, and bed availability  - Significant changes (worsening or significant improvements) in functional status, in the interim, that would warrant dispo to different location    The patient is expected to adequately participate in (tolerate either 3 hours of therapy per day at least 5 days per week or 15 hours of therapy over 7 day period) and benefit significantly from intensive level of therapies offered in acute rehabilitation setting.  The patient is also expected to benefit from increased nursing oversight and medical management not available in subacute/SNF setting.              E coli bacteremia  Recent sepsis improved s/p 7 day course of Abx per ID  Monitor for re-occurrence  E. coli UTI  S/p Abx  - monitor for retention, incontinence (including overflow incontinence), signs/symptoms of UTI  - recommend toileting program Q2-4 H during day and Q4-6H overnight   - recommend bladder scans/urinary retention protocol if concerns/risk of retention  - Optimal bowel management/constipation mgmt/prevention     LETY (acute kidney injury) (HCC)  Baseline Cr 0.9  Recently 1.3 improved from before  Isotonic saline and bicarb per nephro  - Management at discretion of nephro   - Recommend optimal mgmt during rehab process as well   - Monitor  BUN/Cr intermittently as well as electrolytes   - Limit nephrotoxic agents when possible  - Optimal BP mgmt     Type 2 diabetes mellitus with hyperglycemia, without long-term current use of insulin (HCC)  Lab Results   Component Value Date    HGBA1C 6.6 (H) 02/22/2025       Recent Labs     05/06/25  1541 05/06/25  2131 05/07/25  0714 05/07/25  1053   POCGLU 283* 258* 240* 313*       Blood Sugar Average: Last 72 hrs:  (P) 234    HTN, goal below 130/80    Mixed hyperlipidemia    Thyroid nodule    Pulmonary nodule  CTA on 3/26 showed non-specific 4 mm RLL pulmonary nodule  3 month follow up recommended   Liver lesion  CTA showed non-specific 12 mm hypodense right hepatic lesion   MRI abdomen showed no suspicious hepatic lesions, demonstrated simple right hepatic lobe cyst  Constipation    Ambulatory dysfunction    Encephalopathy    Goals of care, counseling/discussion    Palliative care encounter    Metabolic alkalosis    Sepsis (HCC)    Leukopenia    At risk for acid-base imbalance    Electrolyte imbalance risk      Subjective   On eval, patient oriented to self, May when given extended time, not year, not specific hospital, and not fully to situation.  He can follow 1 step commands.  He denies significant pain, SOB, lightheadedness, weakness, or visual changes or other new complaints.    Chief Complaint:  f/u brain tumor    Objective :  Temp:  [98.4 °F (36.9 °C)-98.6 °F (37 °C)] 98.4 °F (36.9 °C)  HR:  [55-66] 60  BP: (114-147)/(66-79) 135/68  Resp:  [13-17] 17  SpO2:  [97 %-99 %] 98 %  O2 Device: None (Room air)      Physical Exam    Neuro/psych/Gen:  - Lying in bed in NAD  - Wakefulness/consciousness - Fairly alert with eyes open spontaneously   - oriented to self, May when given extended time, not year, not specific hospital, and not fully to situation; able to follow one step commands  - Affect - blunted   - Thought process - Slowed verbal processing, decreased content and speech; reduced elaboration   - Cranial  nerves EOM intact   - Motor near full throughtout  - FTN fairly intact  HENT: L crani bx stop unremarkable; MMM  Respiratory: Unlabored breathing  Cardiovascular: Regular rate   Gastrointestinal: Soft, non-distended  Genitourinary: No connell  SkiN/MSK/Extremities:  No calf edema, no calf tenderness to palpation      Scheduled Meds:  Current Facility-Administered Medications   Medication Dose Route Frequency Provider Last Rate    acetaminophen  650 mg Oral Q6H PRN Rustam Drummond MD      allopurinol  300 mg Oral Daily Rustam Drummond MD      alteplase  2 mg Intracatheter Q1MIN PRN Eva Ortiz MD      aluminum-magnesium hydroxide-simethicone  30 mL Oral Q4H PRN Rustam Drummond MD      atorvastatin  20 mg Oral Daily Rustam Drummond MD      bisacodyl  10 mg Rectal Daily Rustam Drummond MD      calcium carbonate  1,000 mg Oral Daily PRN Rustam Drummond MD      chlorhexidine  15 mL Mouth/Throat Q12H [facility] Rustam Drummond MD      dexamethasone  4 mg Oral Q8H [facility] Dionne Huang MD      heparin (porcine)  5,000 Units Subcutaneous Q8H [facility] Rustam Drummond MD      insulin glargine  10 Units Subcutaneous HS Rustam Drummond MD      insulin lispro  2-12 Units Subcutaneous 4x Daily (AC & HS) Dionne Huang MD      insulin lispro  8 Units Subcutaneous TID With Meals Dionne Huang MD      leucovorin 25 mg in dextrose 5 % 50 mL IVPB  25 mg Intravenous Q6H Simba Aiad, DO 25 mg (05/07/25 1355)    melatonin  6 mg Oral HS Rustam Drummond MD      OLANZapine  5 mg Intramuscular BID PRN Dionne Huang MD      ondansetron  4 mg Intravenous Q6H PRN Rustam Drummond MD      oxyCODONE  2.5 mg Oral Q4H PRN Rustam Drummond MD      Or    oxyCODONE  5 mg Oral Q4H PRN Rustam Drummond MD      pantoprazole  40 mg Oral Early Morning Rustam Drummond MD      QUEtiapine  12.5 mg Oral Daily Reji Kay MD      QUEtiapine  50 mg Oral HS  Rylie Stapleton PA-C      senna  2 tablet Oral HS Rustam Drummond MD      sodium bicarbonate 100 mEq in dextrose 5 % 1,000 mL infusion  100 mL/hr Intravenous Continuous Anup Abraham  mL/hr (05/07/25 1017)    sodium chloride  20 mL/hr Intravenous Once PRN Magali Lee MD           Lab Results: I have reviewed the following results:  Results from last 7 days   Lab Units 05/07/25  0443 05/05/25  0439 05/03/25  1555   HEMOGLOBIN g/dL 8.7* 9.9* 9.4*   HEMATOCRIT % 25.3* 28.8* 26.1*   WBC Thousand/uL 3.11* 2.45* 3.01*   PLATELETS Thousands/uL 213 191 146*     Results from last 7 days   Lab Units 05/07/25  0443 05/06/25  0500 05/05/25  0439 05/04/25  0533 05/03/25  0521   BUN mg/dL 40* 36* 28*   < > 30*   SODIUM mmol/L 138 142 145   < > 140   POTASSIUM mmol/L 4.3 4.4 4.1   < > 3.7   CHLORIDE mmol/L 99 105 104   < > 103   CREATININE mg/dL 1.36* 1.58* 1.36*   < > 1.52*   CALCIUM, IONIZED mmol/L  --   --  1.15  --  1.09*   AST U/L  --   --  114*  --   --    ALT U/L  --   --  149*  --   --     < > = values in this interval not displayed.              50 minutes or greater spent for this encounter which included a combination of face-to-face time with Alexis Dennison and non-face-to-face time which in part specifically includes management of brain injury.  Face-to-face time included extended discussion with patient regarding current condition, medical history, mood, medical/rehabilitation management, and disposition.  Non face-to-face time included coordination of care with patient's co-managing AP and/or physician(s) thru communication and review of their recent documentation as well as reviewing vitals, bowel/bladder function, recent labs, and notes from therapy, CM, and nursing.

## 2025-05-07 NOTE — PLAN OF CARE
Problem: PAIN - ADULT  Goal: Verbalizes/displays adequate comfort level or baseline comfort level  Description: Interventions:- Encourage patient to monitor pain and request assistance- Assess pain using appropriate pain scale- Administer analgesics based on type and severity of pain and evaluate response- Implement non-pharmacological measures as appropriate and evaluate response- Notify physician/advanced practitioner if interventions unsuccessful or patient reports new pain  5/7/2025 1804 by Ngozi Vale RN  Outcome: Completed  5/7/2025 0955 by Ngozi Vale RN  Outcome: Progressing     Problem: INFECTION - ADULT  Goal: Absence or prevention of progression during hospitalization  Description: INTERVENTIONS:- Assess and monitor for signs and symptoms of infection- Monitor lab/diagnostic results- Monitor all insertion sites, i.e. indwelling lines, tubes, and drains- [place] appropriate cooling/warming therapies per order- Administer medications as ordered- Instruct and encourage patient and family to use good hand hygiene technique- Identify and instruct in appropriate isolation precautions for identified infection/condition  5/7/2025 1804 by Ngozi Vale RN  Outcome: Completed  5/7/2025 0955 by Ngozi Vale RN  Outcome: Progressing     Problem: SAFETY ADULT  Goal: Patient will remain free of falls  Description: INTERVENTIONS:- Educate patient/family on patient safety including physical limitations- Instruct patient to call for assistance with activity - Consult OT/PT to assist with strengthening/mobility - Keep Call bell within reach- Keep bed low and locked with side rails adjusted as appropriate- Keep care items and personal belongings within reach- Initiate and maintain comfort rounds- Make Fall Risk Sign visible to staff- Offer Toileting every 2 Hours, in advance of need- Initiate/Maintain bed/chair alarm- Obtain necessary fall risk management equipment.- Apply yellow socks and bracelet  for high fall risk patients- Consider moving patient to room near nurses station  INTERVENTIONS:- Educate patient/family on patient safety including physical limitations- Instruct patient to call for assistance with activity - Consult OT/PT to assist with strengthening/mobility - Keep Call bell within reach- Keep bed low and locked with side rails adjusted as appropriate- Keep care items and personal belongings within reach- Initiate and maintain comfort rounds- Make Fall Risk Sign visible to staff- Offer Toileting every 2 Hours, in advance of need- Initiate/Maintain bed alarm- Obtain necessary fall risk management equipment: bracelet, socks, alarms- Apply yellow socks and bracelet for high fall risk patients- Consider moving patient to room near nurses station  5/7/2025 1804 by Ngozi Vale RN  Outcome: Completed  5/7/2025 0955 by Ngozi Vale RN  Outcome: Progressing  Goal: Maintain or return to baseline ADL function  Description: INTERVENTIONS:-  Assess patient's ability to carry out ADLs; assess patient's baseline for ADL function and identify physical deficits which impact ability to perform ADLs (bathing, care of mouth/teeth, toileting, grooming, dressing, etc.)- Assess/evaluate cause of self-care deficits - Assess range of motion- Assess patient's mobility; develop plan if impaired- Assess patient's need for assistive devices and provide as appropriate- Encourage maximum independence but intervene and supervise when necessary- Involve family in performance of ADLs- Assess for home care needs following discharge - Consider OT consult to assist with ADL evaluation and planning for discharge- Provide patient education as appropriate  5/7/2025 1804 by Ngozi Vale RN  Outcome: Completed  5/7/2025 0955 by Ngozi Vale RN  Outcome: Progressing     Problem: DISCHARGE PLANNING  Goal: Discharge to home or other facility with appropriate resources  Description: INTERVENTIONS:- Identify barriers to  discharge w/patient and caregiver- Arrange for needed discharge resources and transportation as appropriate- Identify discharge learning needs (meds, wound care, etc.)- Refer to Case Management Department for coordinating discharge planning if the patient needs post-hospital services based on physician/advanced practitioner order or complex needs related to functional status, cognitive ability, or social support system  5/7/2025 1804 by Ngozi Vale RN  Outcome: Completed  5/7/2025 0955 by Ngozi Vale RN  Outcome: Progressing     Problem: Knowledge Deficit  Goal: Patient/family/caregiver demonstrates understanding of disease process, treatment plan, medications, and discharge instructions  Description: Complete learning assessment and assess knowledge base.Interventions:- Provide teaching at level of understanding- Provide teaching via preferred learning methods  5/7/2025 1804 by Ngozi Vale RN  Outcome: Completed  5/7/2025 0955 by Ngozi Vale RN  Outcome: Progressing     Problem: NEUROSENSORY - ADULT  Goal: Achieves stable or improved neurological status  Description: INTERVENTIONS- Monitor and report changes in neurological status- Monitor vital signs such as temperature, blood pressure, glucose, and any other labs ordered - Initiate measures to prevent increased intracranial pressure- Monitor for seizure activity and implement precautions if appropriate    INTERVENTIONS- Monitor and report changes in neurological status- Monitor vital signs such as temperature, blood pressure, glucose, and any other labs ordered - Initiate measures to prevent increased intracranial pressure- Monitor for seizure activity and implement precautions if appropriate    5/7/2025 1804 by Ngozi Vale RN  Outcome: Completed  5/7/2025 0955 by Ngozi Vale RN  Outcome: Progressing  Goal: Remains free of injury related to seizures activity  Description: INTERVENTIONS- Maintain airway, patient safety  and  administer oxygen as ordered- Monitor patient for seizure activity, document and report duration and description of seizure to physician/advanced practitioner- If seizure occurs,  ensure patient safety during seizure- Reorient patient post seizure- Seizure pads on all 4 side rails- Instruct patient/family to notify RN of any seizure activity including if an aura is experienced- Instruct patient/family to call for assistance with activity based on nursing assessment- Administer anti-seizure medications if ordered  INTERVENTIONS- Maintain airway, patient safety  and administer oxygen as ordered- Monitor patient for seizure activity, document and report duration and description of seizure to physician/advanced practitioner- If seizure occurs,  ensure patient safety during seizure- Reorient patient post seizure- Seizure pads on all 4 side rails- Instruct patient/family to notify RN of any seizure activity including if an aura is experienced- Instruct patient/family to call for assistance with activity based on nursing assessment- Administer anti-seizure medications if ordered  5/7/2025 1804 by Ngozi Vale RN  Outcome: Completed  5/7/2025 0955 by Ngozi Vale RN  Outcome: Progressing  Goal: Achieves maximal functionality and self care  Description: INTERVENTIONS- Monitor swallowing and airway patency with patient fatigue and changes in neurological status- Encourage and assist patient to increase activity and self care. - Encourage visually impaired, hearing impaired and aphasic patients to use assistive/communication devices  INTERVENTIONS- Monitor swallowing and airway patency with patient fatigue and changes in neurological status- Encourage and assist patient to increase activity and self care. - Encourage visually impaired, hearing impaired and aphasic patients to use assistive/communication devices  5/7/2025 1804 by Ngozi Vale RN  Outcome: Completed  5/7/2025 0955 by Ngozi Vale RN  Outcome:  Progressing     Problem: METABOLIC, FLUID AND ELECTROLYTES - ADULT  Goal: Glucose maintained within target range  Description: INTERVENTIONS:- Monitor Blood Glucose as ordered- Assess for signs and symptoms of hyperglycemia and hypoglycemia- Administer ordered medications to maintain glucose within target range- Assess nutritional intake and initiate nutrition service referral as needed  INTERVENTIONS:- Monitor Blood Glucose as ordered- Assess for signs and symptoms of hyperglycemia and hypoglycemia- Administer ordered medications to maintain glucose within target range- Assess nutritional intake and initiate nutrition service referral as needed  5/7/2025 1804 by Ngozi Vale RN  Outcome: Completed  5/7/2025 0955 by Ngozi Vale RN  Outcome: Progressing  Goal: Electrolytes maintained within normal limits  Description: INTERVENTIONS:- Monitor labs and assess patient for signs and symptoms of electrolyte imbalances- Administer electrolyte replacement as ordered- Monitor response to electrolyte replacements, including repeat lab results as appropriate- Instruct patient on fluid and nutrition as appropriate  5/7/2025 1804 by Ngozi Vale RN  Outcome: Completed  5/7/2025 0955 by Ngozi Vale RN  Outcome: Progressing  Goal: Fluid balance maintained  Description: INTERVENTIONS:- Monitor labs - Monitor I/O and WT- Instruct patient on fluid and nutrition as appropriate- Assess for signs & symptoms of volume excess or deficit  5/7/2025 1804 by Ngozi Vale RN  Outcome: Completed  5/7/2025 0955 by Ngozi Vale RN  Outcome: Progressing     Problem: Prexisting or High Potential for Compromised Skin Integrity  Goal: Skin integrity is maintained or improved  Description: INTERVENTIONS:- Identify patients at risk for skin breakdown- Assess and monitor skin integrity- Assess and monitor nutrition and hydration status- Monitor labs - Assess for incontinence - Turn and reposition patient- Assist with  mobility/ambulation- Relieve pressure over bony prominences- Avoid friction and shearing- Provide appropriate hygiene as needed including keeping skin clean and dry- Evaluate need for skin moisturizer/barrier cream- Collaborate with interdisciplinary team - Patient/family teaching- Consider wound care consult   5/7/2025 1804 by Ngozi Vale RN  Outcome: Completed  5/7/2025 0955 by Ngozi Vale RN  Outcome: Progressing     Problem: SAFETY,RESTRAINT: NV/NON-SELF DESTRUCTIVE BEHAVIOR  Goal: Remains free of harm/injury (restraint for non violent/non self-detsructive behavior)  Description: INTERVENTIONS:- Instruct patient/family regarding restraint use - Assess and monitor physiologic and psychological status - Provide interventions and comfort measures to meet assessed patient needs - Identify and implement measures to help patient regain control- Assess readiness for release of restraint   5/7/2025 1804 by Ngozi Vale RN  Outcome: Completed  5/7/2025 0955 by Ngozi Vale RN  Outcome: Progressing  Goal: Returns to optimal restraint-free functioning  Description: INTERVENTIONS:- Assess the patient's behavior and symptoms that indicate continued need for restraint- Identify and implement measures to help patient regain control- Assess readiness for release of restraint   5/7/2025 1804 by Ngozi Vale RN  Outcome: Completed  5/7/2025 0955 by Ngozi Vale RN  Outcome: Progressing     Problem: CARDIOVASCULAR - ADULT  Goal: Maintains optimal cardiac output and hemodynamic stability  Description: INTERVENTIONS:- Monitor I/O, vital signs and rhythm- Monitor for S/S and trends of decreased cardiac output- Administer and titrate ordered vasoactive medications to optimize hemodynamic stability- Assess quality of pulses, skin color and temperature- Assess for signs of decreased coronary artery perfusion- Instruct patient to report change in severity of symptoms  5/7/2025 1804 by Ngozi Vale  RN  Outcome: Completed  5/7/2025 0955 by Ngozi Vale RN  Outcome: Progressing  Goal: Absence of cardiac dysrhythmias or at baseline rhythm  Description: INTERVENTIONS:- Continuous cardiac monitoring, vital signs, obtain 12 lead EKG if ordered- Administer antiarrhythmic and heart rate control medications as ordered- Monitor electrolytes and administer replacement therapy as ordered  5/7/2025 1804 by Ngozi Vale RN  Outcome: Completed  5/7/2025 0955 by Ngozi Vale RN  Outcome: Progressing     Problem: GASTROINTESTINAL - ADULT  Goal: Maintains or returns to baseline bowel function  Description: INTERVENTIONS:- Assess bowel function- Encourage oral fluids to ensure adequate hydration- Administer IV fluids if ordered to ensure adequate hydration- Administer ordered medications as needed- Encourage mobilization and activity- Consider nutritional services referral to assist patient with adequate nutrition and appropriate food choices  5/7/2025 1804 by Ngozi Vale RN  Outcome: Completed  5/7/2025 0955 by Ngozi Vale RN  Outcome: Progressing  Goal: Maintains adequate nutritional intake  Description: INTERVENTIONS:- Monitor percentage of each meal consumed- Identify factors contributing to decreased intake, treat as appropriate- Assist with meals as needed- Monitor I&O, weight, and lab values if indicated- Obtain nutrition services referral as needed  5/7/2025 1804 by Ngozi Vale RN  Outcome: Completed  5/7/2025 0955 by Ngozi Vale RN  Outcome: Progressing     Problem: GENITOURINARY - ADULT  Goal: Maintains or returns to baseline urinary function  Description: INTERVENTIONS:- Assess urinary function- Encourage oral fluids to ensure adequate hydration if ordered- Administer IV fluids as ordered to ensure adequate hydration- Administer ordered medications as needed- Offer frequent toileting- Follow urinary retention protocol if ordered  5/7/2025 1804 by Ngozi Vale RN  Outcome:  Completed  5/7/2025 0955 by Ngozi Vale RN  Outcome: Progressing  Goal: Absence of urinary retention  Description: INTERVENTIONS:- Assess patient’s ability to void and empty bladder- Monitor I/O- Bladder scan as needed- Discuss with physician/AP medications to alleviate retention as needed- Discuss catheterization for long term situations as appropriate  5/7/2025 1804 by Ngozi Vale RN  Outcome: Completed  5/7/2025 0955 by Ngozi Vale RN  Outcome: Progressing     Problem: SKIN/TISSUE INTEGRITY - ADULT  Goal: Skin Integrity remains intact(Skin Breakdown Prevention)  Description: Assess:-Perform [place] assessment every shift-Clean and moisturize skin every 2-Inspect skin when repositioning, toileting, and assisting with ADLS-Assess under medical devices such as restraints every 2-Assess extremities for adequate circulation and sensation Bed Management:-Have minimal linens on bed & keep smooth, unwrinkled-Change linens as needed when moist or perspiring-Avoid sitting or lying in one position for more than 2 hours while in bed-Keep HOB at 30degrees Toileting:-Offer bedside commode-Assess for incontinence every 2-Use incontinent care products after each incontinent episode such as proper cleaning equipment Activity:-Mobilize patient 3 times a day-Encourage activity and walks on unit-Encourage or provide ROM exercises -Turn and reposition patient every 2 Hours-Use appropriate equipment to lift or move patient in bed-Instruct/ Assist with weight shifting every 2 hours  when out of bed in chair-Consider limitation of chair time 2 hour intervalsSkin Care:-Avoid use of baby powder, tape, friction and shearing, hot water or constrictive clothing-Relieve pressure over bony prominences using 2-Do not massage red bony areasNext Steps:-Teach patient strategies to minimize risks such as pressure injuries -  5/7/2025 1804 by Ngozi Vale RN  Outcome: Completed  5/7/2025 0955 by Ngozi Vale RN  Outcome:  Progressing  Goal: Incision(s), wounds(s) or drain site(s) healing without S/S of infection  Description: INTERVENTIONS- Assess and document dressing, incision, wound bed, drain sites and surrounding tissue- Provide patient and family education- Perform skin care/dressing changes as needed  5/7/2025 1804 by Ngozi Vale RN  Outcome: Completed  5/7/2025 0955 by Ngozi Vale RN  Outcome: Progressing     Problem: HEMATOLOGIC - ADULT  Goal: Maintains hematologic stability  Description: INTERVENTIONS- Assess for signs and symptoms of bleeding or hemorrhage- Monitor labs- Administer supportive blood products/factors as ordered and appropriate  5/7/2025 1804 by Ngozi Vale RN  Outcome: Completed  5/7/2025 0955 by Ngozi Vale RN  Outcome: Progressing     Problem: MUSCULOSKELETAL - ADULT  Goal: Maintain or return mobility to safest level of function  Description: INTERVENTIONS:- Assess patient's ability to carry out ADLs; assess patient's baseline for ADL function and identify physical deficits which impact ability to perform ADLs (bathing, care of mouth/teeth, toileting, grooming, dressing, etc.)- Assess/evaluate cause of self-care deficits - Assess range of motion- Assess patient's mobility- Assess patient's need for assistive devices and provide as appropriate- Encourage maximum independence but intervene and supervise when necessary- Involve family in performance of ADLs- Assess for home care needs following discharge - Consider OT consult to assist with ADL evaluation and planning for discharge- Provide patient education as appropriate  5/7/2025 1804 by Ngozi Vale RN  Outcome: Completed  5/7/2025 0955 by Ngozi Vale RN  Outcome: Progressing     Problem: Nutrition/Hydration-ADULT  Goal: Nutrient/Hydration intake appropriate for improving, restoring or maintaining nutritional needs  Description: Monitor and assess patient's nutrition/hydration status for malnutrition. Collaborate with  interdisciplinary team and initiate plan and interventions as ordered.  Monitor patient's weight and dietary intake as ordered or per policy. Utilize nutrition screening tool and intervene as necessary. Determine patient's food preferences and provide high-protein, high-caloric foods as appropriate. INTERVENTIONS:- Monitor oral intake, urinary output, labs, and treatment plans- Assess nutrition and hydration status and recommend course of action- Evaluate amount of meals eaten- Assist patient with eating if necessary - Allow adequate time for meals- Recommend/ encourage appropriate diets, oral nutritional supplements, and vitamin/mineral supplements- Order, calculate, and assess calorie counts as needed- Recommend, monitor, and adjust tube feedings and TPN/PPN based on assessed needs- Assess need for intravenous fluids- Provide specific nutrition/hydration education as appropriate- Include patient/family/caregiver in decisions related to nutrition  5/7/2025 1804 by Ngozi Vale RN  Outcome: Completed  5/7/2025 0955 by Ngozi Vale RN  Outcome: Progressing     Problem: COPING  Goal: Pt/Family able to verbalize concerns and demonstrate effective coping strategies  Description: INTERVENTIONS:- Assist patient/family to identify coping skills, available support systems and cultural and spiritual values- Provide emotional support, including active listening and acknowledgement of concerns of patient and caregivers- Reduce environmental stimuli, as able- Provide patient education- Assess for spiritual pain/suffering and initiate spiritual care, including notification of Pastoral Care or lucie based community as needed- Assess effectiveness of coping strategies  5/7/2025 1804 by Ngozi Vale RN  Outcome: Completed  5/7/2025 0955 by Ngozi Vale RN  Outcome: Progressing  Goal: Will report anxiety at manageable levels  Description: INTERVENTIONS:- Administer medication as ordered- Teach and encourage  coping skills- Provide emotional support- Assess patient/family for anxiety and ability to cope  5/7/2025 1804 by Ngozi Vale RN  Outcome: Completed  5/7/2025 0955 by Ngozi Vale RN  Outcome: Progressing     Problem: Potential for Falls  Goal: Patient will remain free of falls  Description: INTERVENTIONS:- Educate patient/family on patient safety including physical limitations- Instruct patient to call for assistance with activity - Consult OT/PT to assist with strengthening/mobility - Keep Call bell within reach- Keep bed low and locked with side rails adjusted as appropriate- Keep care items and personal belongings within reach- Initiate and maintain comfort rounds- Make Fall Risk Sign visible to staff- Offer Toileting every 2 Hours, in advance of need- Initiate/Maintain bed/chair alarm- Obtain necessary fall risk management equipment.- Apply yellow socks and bracelet for high fall risk patients- Consider moving patient to room near nurses station  INTERVENTIONS:- Educate patient/family on patient safety including physical limitations- Instruct patient to call for assistance with activity - Consult OT/PT to assist with strengthening/mobility - Keep Call bell within reach- Keep bed low and locked with side rails adjusted as appropriate- Keep care items and personal belongings within reach- Initiate and maintain comfort rounds- Make Fall Risk Sign visible to staff- Offer Toileting every 2 Hours, in advance of need- Initiate/Maintain bed alarm- Obtain necessary fall risk management equipment: bracelet, socks, alarms- Apply yellow socks and bracelet for high fall risk patients- Consider moving patient to room near nurses station  5/7/2025 1804 by Ngozi Vale RN  Outcome: Completed  5/7/2025 0955 by Ngozi Vale, RN  Outcome: Progressing

## 2025-05-07 NOTE — ASSESSMENT & PLAN NOTE
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  Lymphoma/leukemia panel nondiagnostic  CT head on 4/3 with interval increase in ventricular caliber concerning for worsening hydrocephalus. Repeat LP on 4/7 was again undiagnostic.   MRI of the brain from 4/11-\"Interval enlargement of the dominant left anterior basal ganglionic mass lesion with greater surrounding vasogenic edema and mass effect. 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  S/p EVD, self removed over 4/26 weekend  Completed keppra 500mg BID x 7 days for postoperative seizure ppx per neurosurgery    Plan:  Patient started on chemotherapy while inpatient.  Discussed with oncology and plan for rituximab infusion weekly and methotrexate every 2 weeks.  Patient will require daily serum methotrexate levels until less than 0.1.  Also recommend dexamethasone taper as follows:4 mg Q8H through 5/5, then 4 mg BID x4 days, then 2 mg BID x4 days, then 1 mg BID x4 days, then 1 mg daily x4 days, then discontinue.   Maintain PICC line  Nephrology & heme-onc monitoring urine pH for urine alkalinization with intermittent bicarb drip   Patient medically stable for " rehab.  Insurance Auth obtained, awaiting bed

## 2025-05-07 NOTE — ASSESSMENT & PLAN NOTE
Daily serum methotrexate levels until level is less than 0.1  Dexamethasone taper ordered  Maintain PICC line

## 2025-05-07 NOTE — ASSESSMENT & PLAN NOTE
S/p Abx  - monitor for retention, incontinence (including overflow incontinence), signs/symptoms of UTI  - recommend toileting program Q2-4 H during day and Q4-6H overnight   - recommend bladder scans/urinary retention protocol if concerns/risk of retention  - Optimal bowel management/constipation mgmt/prevention

## 2025-05-07 NOTE — ASSESSMENT & PLAN NOTE
Incidental findings on CT scan  Lab Results   Component Value Date    [id number] 0.019 (L) 04/21/2025    FREET4 1.13 (H) 04/21/2025       Nonemergent outpatient endocrine follow-up.  No need for ultrasound given patient has hyperthyroid appearance of recent labs.  Will require outpatient confirmation of TSH, free T4, plus minus further imaging with endocrine such as radioactive uptake

## 2025-05-07 NOTE — CASE MANAGEMENT
Case Management Discharge Planning Note    Patient name Alexis Dennison  Location [facility] 904/[facility] 904-01 MRN [id number]  : 1960 Date 2025       Current Admission Date: 3/29/2025  Current Admission Diagnosis:Primary CNS lymphoma   Patient Active Problem List    Diagnosis Date Noted Date Diagnosed    Leukopenia 2025     At risk for acid-base imbalance 2025     Electrolyte imbalance risk 2025     E. coli UTI 2025     Sepsis (HCC) 2025     E coli bacteremia 2025     Metabolic alkalosis 2025     LETY (acute kidney injury) (HCC) 2025     Goals of care, counseling/discussion 2025     Palliative care encounter 2025     Primary CNS lymphoma 2025     Encephalopathy 2025     Constipation 2025     Ambulatory dysfunction 2025     Thyroid nodule 2025     Pulmonary nodule 2025     Liver lesion 2025     Primary central nervous system (CNS) lymphoma 2025     Type 2 diabetes mellitus with hyperglycemia, without long-term current use of insulin (HCC) 2022     HTN, goal below 130/80 2022     Mixed hyperlipidemia 2022     Vitamin D deficiency 2022     NAFLD (nonalcoholic fatty liver disease) 2022       LOS (days): 39  Geometric Mean LOS (GMLOS) (days): 11.1  Days to GMLOS:-27.3     OBJECTIVE:  Risk of Unplanned Readmission Score: 41.35         Current admission status: Inpatient   Preferred Pharmacy:   [facility][id number] - [place] - [address]  [address]  [facility] [address]  Phone: [phone] Fax: [phone]    [facility] - [place], [place] - [address]  [address]  [facility] [address]  Phone: [phone] Fax: [phone]    [facility] - [place], [place] - [address]  [address] [facility]  [address]  [place] [address]  Phone: [phone] Fax:  [phone]    [facility] [place] - [address]  [address] [facility] [address]  Phone: [phone] Fax: [phone]    [facility] #[id number] - [place], [place] - [address] [address]  Phone: [phone] Fax: [phone]    Primary Care Provider: PATI Mckeon    Primary Insurance: BLUE CROSS  Secondary Insurance: CAPITAL    DISCHARGE DETAILS:       Other Referral/Resources/Interventions Provided:  Referral Comments: Message placed to [facility] to inqurie if bed availble today.       [id number] Message from [facility] & plan is to admit today. Awaiting details.

## 2025-05-08 DIAGNOSIS — C83.390 PRIMARY CENTRAL NERVOUS SYSTEM (CNS) LYMPHOMA: ICD-10-CM

## 2025-05-08 PROBLEM — I10 HTN (HYPERTENSION): Status: ACTIVE | Noted: 2025-05-08

## 2025-05-08 PROBLEM — Z78.9 IMPAIRED MOBILITY AND ACTIVITIES OF DAILY LIVING: Status: ACTIVE | Noted: 2025-05-08

## 2025-05-08 PROBLEM — R74.01 TRANSAMINITIS: Status: ACTIVE | Noted: 2025-05-08

## 2025-05-08 PROBLEM — Z74.09 IMPAIRED MOBILITY AND ACTIVITIES OF DAILY LIVING: Status: ACTIVE | Noted: 2025-05-08

## 2025-05-08 LAB
ALBUMIN SERPL BCG-MCNC: 3.1 G/DL (ref 3.5–5)
ALP SERPL-CCNC: 53 U/L (ref 34–104)
ALT SERPL W P-5'-P-CCNC: 322 U/L (ref 7–52)
ANION GAP SERPL CALCULATED.3IONS-SCNC: 5 MMOL/L (ref 4–13)
AST SERPL W P-5'-P-CCNC: 111 U/L (ref 13–39)
BASOPHILS # BLD MANUAL: 0 THOUSAND/UL (ref 0–0.1)
BASOPHILS NFR MAR MANUAL: 0 % (ref 0–1)
BILIRUB SERPL-MCNC: 0.75 MG/DL (ref 0.2–1)
BUN SERPL-MCNC: 36 MG/DL (ref 5–25)
CALCIUM ALBUM COR SERPL-MCNC: 9.2 MG/DL (ref 8.3–10.1)
CALCIUM SERPL-MCNC: 8.5 MG/DL (ref 8.4–10.2)
CHLORIDE SERPL-SCNC: 100 MMOL/L (ref 96–108)
CO2 SERPL-SCNC: 32 MMOL/L (ref 21–32)
CREAT SERPL-MCNC: 1.23 MG/DL (ref 0.6–1.3)
DIFFERENTIAL COMMENT: ABNORMAL
EOSINOPHIL # BLD MANUAL: 0 THOUSAND/UL (ref 0–0.4)
EOSINOPHIL NFR BLD MANUAL: 0 % (ref 0–6)
ERYTHROCYTE [DISTWIDTH] IN BLOOD BY AUTOMATED COUNT: 11.4 % (ref 11.6–15.1)
GFR SERPL CREATININE-BSD FRML MDRD: 61 ML/MIN/1.73SQ M
GLUCOSE P FAST SERPL-MCNC: 206 MG/DL (ref 65–99)
GLUCOSE SERPL-MCNC: 133 MG/DL (ref 65–140)
GLUCOSE SERPL-MCNC: 195 MG/DL (ref 65–140)
GLUCOSE SERPL-MCNC: 206 MG/DL (ref 65–140)
GLUCOSE SERPL-MCNC: 261 MG/DL (ref 65–140)
GLUCOSE SERPL-MCNC: 280 MG/DL (ref 65–140)
HCT VFR BLD AUTO: 24.2 % (ref 36.5–49.3)
HGB BLD-MCNC: 8.6 G/DL (ref 12–17)
LYMPHOCYTES # BLD AUTO: 0.63 THOUSAND/UL (ref 0.6–4.47)
LYMPHOCYTES # BLD AUTO: 24 % (ref 14–44)
MCH RBC QN AUTO: 30.8 PG (ref 26.8–34.3)
MCHC RBC AUTO-ENTMCNC: 35.5 G/DL (ref 31.4–37.4)
MCV RBC AUTO: 87 FL (ref 82–98)
MONOCYTES # BLD AUTO: 0.03 THOUSAND/UL (ref 0–1.22)
MONOCYTES NFR BLD: 1 % (ref 4–12)
MTX SERPL-SCNC: <0.1 UMOL/L
MTX SERPL-SCNC: <0.1 UMOL/L
NEUTROPHILS # BLD MANUAL: 1.97 THOUSAND/UL (ref 1.85–7.62)
NEUTS BAND NFR BLD MANUAL: 1 % (ref 0–8)
NEUTS SEG NFR BLD AUTO: 74 % (ref 43–75)
PLATELET # BLD AUTO: 195 THOUSANDS/UL (ref 149–390)
PLATELET BLD QL SMEAR: ADEQUATE
PMV BLD AUTO: 9.6 FL (ref 8.9–12.7)
POTASSIUM SERPL-SCNC: 4.1 MMOL/L (ref 3.5–5.3)
PROT SERPL-MCNC: 4.9 G/DL (ref 6.4–8.4)
RBC # BLD AUTO: 2.79 MILLION/UL (ref 3.88–5.62)
RBC MORPH BLD: NORMAL
SMUDGE CELLS BLD QL SMEAR: PRESENT
SODIUM SERPL-SCNC: 137 MMOL/L (ref 135–147)
WBC # BLD AUTO: 2.63 THOUSAND/UL (ref 4.31–10.16)

## 2025-05-08 PROCEDURE — 92523 SPEECH SOUND LANG COMPREHEN: CPT

## 2025-05-08 PROCEDURE — 82948 REAGENT STRIP/BLOOD GLUCOSE: CPT

## 2025-05-08 PROCEDURE — 85007 BL SMEAR W/DIFF WBC COUNT: CPT | Performed by: FAMILY MEDICINE

## 2025-05-08 PROCEDURE — 80053 COMPREHEN METABOLIC PANEL: CPT | Performed by: FAMILY MEDICINE

## 2025-05-08 PROCEDURE — 80204 DRUG ASSAY METHOTREXATE: CPT | Performed by: FAMILY MEDICINE

## 2025-05-08 PROCEDURE — 97530 THERAPEUTIC ACTIVITIES: CPT

## 2025-05-08 PROCEDURE — 97535 SELF CARE MNGMENT TRAINING: CPT

## 2025-05-08 PROCEDURE — 99232 SBSQ HOSP IP/OBS MODERATE 35: CPT | Performed by: INTERNAL MEDICINE

## 2025-05-08 PROCEDURE — 99255 IP/OBS CONSLTJ NEW/EST HI 80: CPT | Performed by: STUDENT IN AN ORGANIZED HEALTH CARE EDUCATION/TRAINING PROGRAM

## 2025-05-08 PROCEDURE — 97130 THER IVNTJ EA ADDL 15 MIN: CPT

## 2025-05-08 PROCEDURE — 99232 SBSQ HOSP IP/OBS MODERATE 35: CPT | Performed by: NURSE PRACTITIONER

## 2025-05-08 PROCEDURE — 97129 THER IVNTJ 1ST 15 MIN: CPT

## 2025-05-08 PROCEDURE — 85027 COMPLETE CBC AUTOMATED: CPT | Performed by: FAMILY MEDICINE

## 2025-05-08 PROCEDURE — 99255 IP/OBS CONSLTJ NEW/EST HI 80: CPT | Performed by: INTERNAL MEDICINE

## 2025-05-08 PROCEDURE — 97163 PT EVAL HIGH COMPLEX 45 MIN: CPT

## 2025-05-08 PROCEDURE — 97167 OT EVAL HIGH COMPLEX 60 MIN: CPT

## 2025-05-08 RX ORDER — POLYETHYLENE GLYCOL 3350 17 G/17G
17 POWDER, FOR SOLUTION ORAL DAILY PRN
Status: DISCONTINUED | OUTPATIENT
Start: 2025-05-08 | End: 2025-05-16

## 2025-05-08 RX ORDER — INSULIN LISPRO 100 [IU]/ML
3 INJECTION, SOLUTION INTRAVENOUS; SUBCUTANEOUS
Status: DISCONTINUED | OUTPATIENT
Start: 2025-05-08 | End: 2025-05-10

## 2025-05-08 RX ORDER — DOCUSATE SODIUM 100 MG/1
100 CAPSULE, LIQUID FILLED ORAL 2 TIMES DAILY
Status: DISCONTINUED | OUTPATIENT
Start: 2025-05-08 | End: 2025-05-28 | Stop reason: HOSPADM

## 2025-05-08 RX ORDER — BISACODYL 10 MG
10 SUPPOSITORY, RECTAL RECTAL DAILY PRN
Status: DISCONTINUED | OUTPATIENT
Start: 2025-05-08 | End: 2025-05-16

## 2025-05-08 RX ORDER — SENNOSIDES 8.6 MG
2 TABLET ORAL
Status: DISCONTINUED | OUTPATIENT
Start: 2025-05-09 | End: 2025-05-28 | Stop reason: HOSPADM

## 2025-05-08 RX ADMIN — DEXAMETHASONE 4 MG: 4 TABLET ORAL at 21:32

## 2025-05-08 RX ADMIN — CHLORHEXIDINE GLUCONATE 15 ML: 1.2 SOLUTION ORAL at 08:01

## 2025-05-08 RX ADMIN — ATORVASTATIN CALCIUM 20 MG: 20 TABLET, FILM COATED ORAL at 08:01

## 2025-05-08 RX ADMIN — INSULIN LISPRO 3 UNITS: 100 INJECTION, SOLUTION INTRAVENOUS; SUBCUTANEOUS at 16:51

## 2025-05-08 RX ADMIN — Medication 6 MG: at 21:32

## 2025-05-08 RX ADMIN — DOCUSATE SODIUM 100 MG: 100 CAPSULE, LIQUID FILLED ORAL at 17:07

## 2025-05-08 RX ADMIN — QUETIAPINE 50 MG: 25 TABLET ORAL at 21:32

## 2025-05-08 RX ADMIN — LEUCOVORIN CALCIUM 25 MG: 50 INJECTION, POWDER, LYOPHILIZED, FOR SOLUTION INTRAMUSCULAR; INTRAVENOUS at 02:40

## 2025-05-08 RX ADMIN — METFORMIN ER 500 MG 1000 MG: 500 TABLET ORAL at 08:04

## 2025-05-08 RX ADMIN — QUETIAPINE 12.5 MG: 25 TABLET ORAL at 11:29

## 2025-05-08 RX ADMIN — HEPARIN SODIUM 5000 UNITS: 5000 INJECTION INTRAVENOUS; SUBCUTANEOUS at 05:43

## 2025-05-08 RX ADMIN — HEPARIN SODIUM 5000 UNITS: 5000 INJECTION INTRAVENOUS; SUBCUTANEOUS at 21:32

## 2025-05-08 RX ADMIN — DEXAMETHASONE 4 MG: 4 TABLET ORAL at 08:01

## 2025-05-08 RX ADMIN — ALLOPURINOL 300 MG: 300 TABLET ORAL at 08:01

## 2025-05-08 RX ADMIN — HEPARIN SODIUM 5000 UNITS: 5000 INJECTION INTRAVENOUS; SUBCUTANEOUS at 14:28

## 2025-05-08 RX ADMIN — INSULIN LISPRO 6 UNITS: 100 INJECTION, SOLUTION INTRAVENOUS; SUBCUTANEOUS at 11:30

## 2025-05-08 RX ADMIN — INSULIN LISPRO 2 UNITS: 100 INJECTION, SOLUTION INTRAVENOUS; SUBCUTANEOUS at 16:50

## 2025-05-08 RX ADMIN — INSULIN GLARGINE 10 UNITS: 100 INJECTION, SOLUTION SUBCUTANEOUS at 21:32

## 2025-05-08 RX ADMIN — INSULIN LISPRO 8 UNITS: 100 INJECTION, SOLUTION INTRAVENOUS; SUBCUTANEOUS at 11:30

## 2025-05-08 RX ADMIN — CHLORHEXIDINE GLUCONATE 15 ML: 1.2 SOLUTION ORAL at 21:32

## 2025-05-08 RX ADMIN — PANTOPRAZOLE SODIUM 40 MG: 40 TABLET, DELAYED RELEASE ORAL at 05:43

## 2025-05-08 NOTE — UTILIZATION REVIEW
NOTIFICATION OF ADMISSION DISCHARGE   This is a Notification of Discharge from [facility]. Please be advised that this patient has been discharge from our facility. Below you will find the admission and discharge date and time including the patient’s disposition.   UTILIZATION REVIEW CONTACT:  Utilization Review Assistants  Network Utilization Review Department  Phone: [phone] x carefully listen to the prompts. All voicemails are confidential.  Email: [email or web address]     ADMISSION INFORMATION  PRESENTATION DATE: 3/29/2025  6:40 PM  OBERVATION ADMISSION DATE: N/A  INPATIENT ADMISSION DATE: 3/29/25 10:59 PM   DISCHARGE DATE: 5/7/2025  6:05 PM   DISPOSITION:[facility]    Network Utilization Review Department  ATTENTION: Please call with any questions or concerns to [phone] and carefully listen to the prompts so that you are directed to the right person. All voicemails are confidential.   For Discharge needs, contact Care Management DC Support Team at [phone] opt. 2  Send all requests for admission clinical reviews, approved or denied determinations and any other requests to dedicated fax number below belonging to the campus where the patient is receiving treatment. List of dedicated fax numbers for the Facilities:  FACILITY NAME UR FAX NUMBER   ADMISSION DENIALS (Administrative/Medical Necessity) [phone]   DISCHARGE SUPPORT TEAM ([facility]) [phone]   PARENT CHILD HEALTH (Maternity/NICU/Pediatrics) [phone]   [facility] [phone]   [facility] [phone]   [facility] [phone]   [facility] [phone]   [facility] [phone]   [facility] [phone]   [facility] [phone]   [facility] [phone]   [facility]  [facility] [phone]   [facility] [phone]   [facility] [phone]   [facility] [phone]

## 2025-05-08 NOTE — ASSESSMENT & PLAN NOTE
Patient noted with transaminitis, LFTs has been fluctuating, more recently this morning noted , .  Could be due to recent chemotherapy versus antibiotics versus liver lesions.  Monitor CMP in a.m., if continue to trend up will need repeat imaging.

## 2025-05-08 NOTE — PROGRESS NOTES
"Initial [facility] OT Evaluation     05/08/25 2394   Patient Data   Rehab Impairment Impairment of mobility, safety, Activities of Daily Living (ADLs), and cognitive/communication skills due to Brain Dysfunction:  02.1  Non-Traumatic   Etiologic Diagnosis Primary CNS lymphoma   Date of Onset 03/29/25   Support System   Name Pt states living with sister, but per chart lives with wife Naldo. Per chart children not local.   Able to provide 24 hour supervision Yes   Able to provide physical help? Yes   Home Setup   Type of Home Multi Level   Method of Entry Stairs   Number of Stairs 3   Number of Stairs in Home   (full flight)   First Floor Bathroom Half   Second Floor Bathroom Full   First Floor Setup Available Yes   Home Modifications Necessary?   (TBD)   Available Equipment   (no AD)   Baseline Information   Vocation Work Full Time  (pt states, \"not much\"; per wife )   Transportation    Prior Device(s) Used   (no AD)   Prior IADL Participation   Money Management Identify Money;Estimate Costs;Estimate Change;Combine Bills   Meal Preparation   (per pt wife completes)   Laundry   (per pt wife completes)   Home Cleaning   (per pt wife completes)   Prior Level of Function   Self-Care 3. Independent - Patient completed the activities by him/herself, with or without an assistive device, with no assistance from a helper.   Indoor-Mobility (Ambulation) 3. Independent - Patient completed the activities by him/herself, with or without an assistive device, with no assistance from a helper.   Stairs 3. Independent - Patient completed the activities by him/herself, with or without an assistive device, with no assistance from a helper.   Functional Cognition 3. Independent - Patient completed the activities by him/herself, with or without an assistive device, with no assistance from a helper.   Prior Device Used   (no AD)   Falls in the Last Year   Number of falls in the past 12 months 0   Psychosocial "   Psychosocial (WDL) X   Patient Behaviors/Mood Flat affect   Restrictions/Precautions   Precautions Bed/chair alarms;Cognitive;Fall Risk;Impulsive;Multiple lines;Supervision on toilet/commode;1:1  (PICC, IV LUE)   Weight Bearing Restrictions No   ROM Restrictions No   Pain Assessment   Pain Assessment Tool 0-10   Pain Score No Pain   Eating Assessment   Type of Assistance Needed Set-up / clean-up   Physical Assistance Level No physical assistance   Comment eating applesauce sitting up in bed, agreeable to OOB for lunch   Eating CARE Score 5   Oral Hygiene   Type of Assistance Needed Incidental touching   Physical Assistance Level No physical assistance   Comment in stance at sink; requires vc's to locate toothpaste, sequencing for task; pt rinses and spits water into basin in trash can rather than in sink   Oral Hygiene CARE Score 4   Grooming   Able To Shave;Wash/Dry Face;Comb/Brush Hair;Brush/Clean Teeth;Wash/Dry Hands   Findings ModA for thoroughness with electric razor, inconsistently follows vc's for technique using razor. Otherwise completes tasks at CGA level in stance, SUP seated   Tub/Shower Transfer   Reason Not Assessed Sponge Bath   Shower/Bathe Self   Type of Assistance Needed Physical assistance   Physical Assistance Level 25% or less   Comment pt requires vc's for sequencing of task and for thoroughness; pt completes SB seated EOB but requiring multiple rest breaks lying SUP in bed 2* fatigue. Pt therefore washing groin while lying supine with SUP, anticipate based on functional performance he would require CGA in stance to wash groin/buttocks. Anticipate he would require Maddy for balance if completing entirety of task in stance   Shower/Bathe Self CARE Score 3   Dressing/Undressing Clothing   Remove UB Clothes   (gown)   Don UB Clothes Pullover Shirt   Type of Assistance Needed Supervision;Verbal cues   Physical Assistance Level No physical assistance   Comment requires vc's to fully doff gown off  RUE, requires vc's for orientation of shirt, able to don with SUP   Upper Body Dressing CARE Score 4   Don LB Clothes Pants  (brief 2* reported incontinence)   Type of Assistance Needed Physical assistance   Physical Assistance Level 26%-50%   Comment requires Alcides to don brief, pt attempts threading LE into pant legs in stance, requires Alcides for balance to thread LLE in stance and vc's for safety to sit to thread pants through RLE   Lower Body Dressing CARE Score 3   Putting On/Taking Off Footwear   Type of Assistance Needed Supervision   Physical Assistance Level No physical assistance   Comment seated EOB for socks   Putting On/Taking Off Footwear CARE Score 4   Toileting Hygiene   Type of Assistance Needed Incidental touching   Physical Assistance Level No physical assistance   Comment did not have BM but completes post hygiene seated with CS, CM in stance with CGA   Toileting Hygiene CARE Score 4   Toilet Transfer   Type of Assistance Needed Incidental touching   Physical Assistance Level No physical assistance   Comment SPT with CGA without AD   Toilet Transfer CARE Score 4   Transfer Bed/Chair/Wheelchair   Type of Assistance Needed Incidental touching   Physical Assistance Level No physical assistance   Comment SPT with CGA   Chair/Bed-to-Chair Transfer CARE Score 4   Sit to Lying   Type of Assistance Needed Supervision   Physical Assistance Level No physical assistance   Sit to Lying CARE Score 4   Lying to Sitting on Side of Bed   Type of Assistance Needed Supervision   Physical Assistance Level No physical assistance   Lying to Sitting on Side of Bed CARE Score 4   Sit to Stand   Type of Assistance Needed Incidental touching   Physical Assistance Level No physical assistance   Comment without AD   Sit to Stand CARE Score 4   Comprehension   Assist Devices Glasses  (for reading per wife)   QI: Comprehension 2. Sometimes Understands: Understands only basic conversations or simple, direct phrases. Frequently  "requires cues to understand   Expression   QI: Expression 3. Exhibits some difficulty with expressing needs and ideas (e.g., some words or finishing thoughts) or speech is not clear   RUE Assessment   RUE Assessment WFL  (for ADLs, can complete further formal assessment PRN)   LUE Assessment   LUE Assessment WFL  (for ADLs, can complete further formal assessment PRN)   Cognition   Overall Cognitive Status Impaired   Arousal/Participation Alert;Cooperative   Attention Attends with cues to redirect   Orientation Level Oriented to person;Disoriented to place;Disoriented to time;Disoriented to situation  (unable to accurately state place of hospital, but states he is in [place], unable to state current city. Pt stating year as 2023, states month as June.)   Memory Decreased recall of biographical information;Decreased short term memory;Decreased recall of recent events;Decreased recall of precautions   Following Commands Follows one step commands with increased time or repetition   Comments demo's increased processing time, impaired comprehension, memory, safety awareness, insight, problem solving, sequencing and initiation. requires vc's to ensure safety throughout session   Discharge Information   Patient's Discharge Plan home with family support and supervision   Patient's Rehab Expectations \"to work on exercises\"   Barriers to Discharge Home Limited Family Support;Decreased Cognitive Function;Decreased Strength;Decreased Endurance;Safety Considerations   Impressions 64yr old M with PMH including DM2, GERD, HLD, and HTN who presents to [facility] on 3/29/25 for increasing confusion. He had been recently diagnosed with a brain mass and family reported that he had been having increased symptoms including forgetfulness over the past several months. Initial workup was negative however he was seen in the ER on 3/24 and a CT of the head was concerning for brain lesion. 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  Additional areas of enhancement in the basal ganglia on the left as well as the left cerebral peduncle/left quirino. There was an initial plan to have an outpatient neurosurgery evaluation but because of his worsening symptoms including visual deficits he would came to the ER sooner. A CT of the chest abdomen pelvis was completed on 3/29 showing a right lower lobe pulmonary nodule and nonspecific hepatic lesion and MRI was completed revealing a simple right hepatic lobe cyst. A diagnostic lumbar puncture was completed on 3/31 suspicious for CNS lymphoma and a repeat CT completed on 4/3 showed worsening hydrocephalus and MRI on 4/11 showed disease progression. Biopsy was done and EVD placed on 4/14 and the patient self extubated postprocedure. The preliminary results from the biopsy were indicative of a small round blue cell tumor suggestive of non-Hodgkin's lymphoma. He was started on high-dose steroids, methotrexate and rituximab by oncology but did have a complicated course including LETY, intermittent agitated delirium requiring medications as well as continuous observation as well as an E. coli UTI with bacteremia status post course of antibiotics. The EVD was removed on 4/26.     Pt lives at home with his wife in 2  with 3STE and half bath on first floor. PTA pt was completely independent with BADLs and was working full time as . Recommend clarifying home environment/PLOF (I.e. IADLS with IADL goals to be added PRN) with family as pt with difficulty accurately reporting at this time. Upon initial evaluation, pt presents with impaired cognition (processing time, sequencing/initiation, comprehension, problem solving, insight, safety awareness, memory), impaired standing balance, and impaired activity tolerance that are impacting his ability to complete BADLs and functional transfers/mobility independently. Pt would  benefit from 7-10 day ELOS to achieve SUP level goals.   OT Therapy Minutes   OT Time In 0930   OT Time Out 1100   OT Total Time (minutes) 90   OT Mode of treatment - Individual (minutes) 90   OT Mode of treatment - Concurrent (minutes) 0   OT Mode of treatment - Group (minutes) 0   OT Mode of treatment - Co-treat (minutes) 0   OT Mode of Treatment - Total time(minutes) 90 minutes   OT Cumulative Minutes 90   Cumulative Minutes   Cumulative therapy minutes 135

## 2025-05-08 NOTE — ASSESSMENT & PLAN NOTE
> CTA 3/29: nonspecific 4 mm RLL pulmonary nodule   - follow up outpatient for repeat imaging in 3 months

## 2025-05-08 NOTE — ASSESSMENT & PLAN NOTE
"Patient with development of worsening confusion, and was seen in the ED on 3/24/2025.  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  Lymphoma/leukemia panel was nondiagnostic  CTH 4/3/25: concerning for worsening hydrocephalus.   Repeat LP 4/7/25 = undiagnostic again   MRI brain 4/11/25: \"Interval enlargement of the dominant left anterior basal ganglionic mass lesion with greater surrounding vasogenic edema and mass effect. 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  S/p EVD, self removed 4/26  S/p Keppra 500mg BID x 7 days for postoperative seizure ppx   Started on chemotherapy during hospital stay and is for weekly Rituximab and Methotrexate every 2 weeks  Daily serum methotrexate levels until level is less than 0.1  Nephrology & heme-onc monitoring urine pH for urine alkalinization with intermittent bicarb drip   Continue Dexamethasone taper = LD will be 5/19/25  Maintain PICC line  Will reconsult H-O  "

## 2025-05-08 NOTE — PHYSICAL THERAPY NOTE
Noted Alexis was walking in the hallway with the 1:1 and PCA. Attempted to don gait belt to help keep Alexis safer when walking, and he refused. He was carrying a cup of water and a few personal items (tissues, paper) in his other hand. He said he wanted to get to the elevator to go outside to go home. Worked on redirecting with walking around the unit with a chair following, offering to look out the window, call his wife or his dtr, all of which he declined. He walked up to the staff elevator that is currently out of order, pressed the button to call the elevator, and then realized it wasn't worked. He didn't acknowledge the sign on the elevator door that says the elevator is out of order.     I reviewed with care team that he can walk in the hallways, but will need someone at [facility] as he had a LOB during this walk needing Minal to help him stabilize, but during the LOB he also leaned into the wall/turned into the wall due to the LOB. Recommending he keeps the gait belt on during the day so it's ready to use as needed to prevent a fall, as he doesn't demonstrate any understanding that his righting reactions/balance is impaired. After walking for a little bit today, was able to help him sit in the WC to take a rest break,donned the leg rests and 1:1 was wheeling him around the unit.   Reminded team to fill out the behavior log to keep track of how often he feels restless and what interventions are working/not working so we can provide this information to the rest of the team and his family.

## 2025-05-08 NOTE — ASSESSMENT & PLAN NOTE
TSH/free T4 4/21/25 = 0.019/1.13  Nonemergent outpatient endocrine follow-up.   Will require outpatient TSH, free T4, +/- further imaging with endocrine such as radioactive uptake

## 2025-05-08 NOTE — ASSESSMENT & PLAN NOTE
> CTA 3/29- nonspecific 12mm hypodense right hepatic lesion, recommended MRI abdomen  > MRI abdomen 3/30- simple right hepatic lobe cyst    - Follow up outpatient for monitoring

## 2025-05-08 NOTE — ASSESSMENT & PLAN NOTE
AST is 111, previously 114,  ALT is 322 previously 149  D/w H-O, they are aware.    For CMP 5/9/25

## 2025-05-08 NOTE — ASSESSMENT & PLAN NOTE
64 yoM with PMHx of DM2, NAFLD, and HT who presented with progressive encephalopathy found to have multiple scattered nodular cerebral and cerebellar enhancement who underwent two non-diagnostic LPs prompting stereotactic brain biopsy (4/14) which showed large B-cell lymphoma. See oncology progress note on 4/21 for full diagnostic work up. On 4/29, agitation led to infectious work up with showed UA+ and BCx with 2/2 E. Coli sensitive to ceftriaxone. ID was consulted is managing and has approved to restart chemo 5/2.     Treatment Plan: regimen modified from (https://pubmed.ncbi.nlm.nih.gov/94410709/)    High-dose methotrexate every 2 weeks x4 cycles followed by every 4 weeks maintenance (could consider dosing every 4 weeks if he discharges to outside rehab)  C1 (4/17) 8 g/m²  C2 (5/2) 3 g/m², dose-reduced due to LETY, delayed x1 day due to E. coli bacteremia   Urine pH remained above 7.0.  Methotrexate level 24 hours post high-dose methotrexate, from 5/3/2025 was less than 0.10. Repeat levels on 5/5/25 at 0.36. Leucovorin, bicarb gtt was restarted.  MTX level from today pending.UA Ph 8.0.  Will monitor urine pH and MTX level.

## 2025-05-08 NOTE — PCC NURSING
64 y.o. male with a PMH of hypertension, hyperlipidemia, diabetes who presented to the [facility] primary history of persistent lymphoma.  Patient was just hospitalized for encephalopathy due to primary CNS lymphoma.  Patient on a dexamethasone taper.  Patient received methotrexate during his hospitalization and will need continuing monitoring.  Patient now admitted to inpatient acute rehab.     5/8/25-This week we will continue to monitor patient's labs and vital signs and monitor pain. We will medicate patient for pain as ordered. We will encourage independence with ADL's. We will educate fall prevention and safety precautions. Pt is a 1:1 for cognitive impairment, poor safety and impulsive behavior. We will continue medication education. Monitor and maintain adequate po intake. Turn and reposition Q 2 hour, assess and monitor for skin breakdown.    5/13  LETY is resolved and renal function is stable.Renal signed off.On 5/10/25, increased Lispro WM to 4U TID .BSs should continue to improve with steroid being tapered  Will increase Lispro WM to 6U TID today 5/12/25, Continue DM diet.  This week we will continue to monitor patient's labs and vital signs and monitor pain. We will medicate patient for pain as ordered. We will encourage independence with ADL's. We will educate fall prevention and safety precautions. Pt is a 1:1 for cognitive impairment, poor safety and impulsive behavior. We will continue medication education. Monitor and maintain adequate po intake. Turn and reposition Q 2 hour, assess and monitor for skin breakdown.    5/21- Pt cleared for chemo 5/21.  Had methotrexate as ordered and chemo precautions intitiated.  IVF w NAHCO3 ordered by renal.  Cr improving.  IVAbx changed to po.  Decadron taper completed.  Pt was not on insulin at home.  Insulin w meals d/c and hopefully will resume metformin.  Remains on 1:1 watch for safety.  Is inc urine at times.  Otherwise continent of  bowel.    This week we will encourage independence with ADLs.  We will monitor labs and vital signs.  We will educate pt/family about repositioning to prevent skin breakdown.  We will assist w repositioning and perform routine skin checks.  We will monitor for adequate pain control.  We will monitor for constipation and medicate pt as ordered.  We will increase safety awareness and keep pt free from falls.

## 2025-05-08 NOTE — ASSESSMENT & PLAN NOTE
Patient acute kidney injury his creatinine baseline 0.9 with hydration and is 1.2.  Again urinalysis was not suggestive of an intrinsic nephritis or interstitial process.  I cannot say that there was not mild nonoliguric ATN but again things are stable.  The patient completed his high dose infusion of methotrexate.  Had been on fluids with bicarbonate.  He has now completed his infusion.  His last methotrexate level was 0.13.  This value was lower than 0.5 which the comment is made you do not want to see at that level 48 hours postinfusion.  It had been decreasing as well.    Discontinue IV fluids  Heme-onc monitoring methotrexate levels  BMP a.m.      The patient has history of CNS lymphoma and received high-dose methotrexate infusion per hematology oncology who is managing this.  Also developed E. coli bacteremia.

## 2025-05-08 NOTE — PROGRESS NOTES
05/08/25 1230   Patient Data   Rehab Impairment Impairment of mobility, safety, Activities of Daily Living (ADLs), and cognitive/communication skills due to Brain Dysfunction: 02.1 Non-Traumatic   Etiologic Diagnosis Primary CNS lymphoma   Date of Onset 03/29/25   Support System   Name He reports he lives with his wife, will need to contact family to confirm home setup and discuss d/c planning . his dtr is also listed as a contact.   Home Setup   Type of Home Multi Level   Method of Entry Stairs   Number of Stairs 3  (to confirm)   Number of Stairs in Home 12   In Home Hand Rail Right  (to confirm HR location)   Home Modifications Necessary?   (wiill need to discuss with family)   Prior Level of Function   Self-Care 3. Independent - Patient completed the activities by him/herself, with or without an assistive device, with no assistance from a helper.   Indoor-Mobility (Ambulation) 3. Independent - Patient completed the activities by him/herself, with or without an assistive device, with no assistance from a helper.   Stairs 3. Independent - Patient completed the activities by him/herself, with or without an assistive device, with no assistance from a helper.   Functional Cognition 3. Independent - Patient completed the activities by him/herself, with or without an assistive device, with no assistance from a helper.   Prior Device Used Z. None of the above   Psychosocial   Patient Behaviors/Mood Flat affect   Restrictions/Precautions   Precautions Bed/chair alarms;Cognitive;Fall Risk;1:1;Supervision on toilet/commode   Pain Assessment   Pain Assessment Tool 0-10   Pain Score No Pain   Transfer Bed/Chair/Wheelchair   Limitations Noted In Balance;LE Strength   Adaptive Equipment None   Type of Assistance Needed Physical assistance   Physical Assistance Level 25% or less   Chair/Bed-to-Chair Transfer CARE Score 3   Roll Left and Right   Type of Assistance Needed Supervision   Roll Left and Right CARE Score 4   Sit to  Lying   Type of Assistance Needed Supervision   Sit to Lying CARE Score 4   Lying to Sitting on Side of Bed   Type of Assistance Needed Supervision   Lying to Sitting on Side of Bed CARE Score 4   Sit to Stand   Type of Assistance Needed Physical assistance   Physical Assistance Level 25% or less   Sit to Stand CARE Score 3   Picking Up Object   Type of Assistance Needed Physical assistance   Physical Assistance Level 26%-50%   Comment picked up a pen from the floor, he wound up putting his R hand on the bed to stabliize when standing up   Picking Up Object CARE Score 3   Car Transfer   Type of Assistance Needed Physical assistance   Physical Assistance Level 25% or less   Comment he held onto car door; recommending  for car trasnfer for home to provide vc for improved hand placement   Car Transfer CARE Score 3   Ambulation   Primary Mode of Locomotion Prior to Admission Walk   Distance Walked (feet) 150 ft  (x2)   Assist Device   (none, gait belt)   Gait Pattern Inconsistant Vicki;Slow Vicki;Step through;Improper weight shift   Limitations Noted In Balance;Endurance;Sequencing;Speed;Strength;Swing   Provided Assistance with: Balance   Walk Assist Level Minimum Assist   Findings LOB more often around turns than in striaght open pathways   Walk 10 Feet   Type of Assistance Needed Physical assistance   Physical Assistance Level 25% or less   Walk 10 Feet CARE Score 3   Walk 50 Feet with Two Turns   Type of Assistance Needed Physical assistance   Physical Assistance Level 25% or less   Walk 50 Feet with Two Turns CARE Score 3   Walk 150 Feet   Type of Assistance Needed Physical assistance   Physical Assistance Level 25% or less   Walk 150 Feet CARE Score 3   Walking 10 Feet on Uneven Surfaces   Type of Assistance Needed Physical assistance   Physical Assistance Level 26%-50%   Walking 10 Feet on Uneven Surfaces CARE Score 3   Wheel 50 Feet with Two Turns   Reason if not Attempted Activity not applicable   Wheel  "50 Feet with Two Turns CARE Score 9   Wheel 150 Feet   Reason if not Attempted Activity not applicable   Wheel 150 Feet CARE Score 9   Curb or Single Stair   Style negotiated Single stair   Type of Assistance Needed Physical assistance;Adaptive equipment   Physical Assistance Level 26%-50%   1 Step (Curb) CARE Score 3   4 Steps   Type of Assistance Needed Physical assistance;Adaptive equipment   Physical Assistance Level 26%-50%   4 Steps CARE Score 3   12 Steps   Reason if not Attempted Safety concerns   12 Steps CARE Score 88   Stairs   Type  Steps   # of Steps 6  (6\"  steps BHR. 4 of 6\"  steps, two with R HR up and down, two with L HR Up and down, reciprocal pattern, gait belt on, more A provided with use of 1 HR intead of 2)   Comprehension   QI: Comprehension 2. Sometimes Understands: Understands only basic conversations or simple, direct phrases. Frequently requires cues to understand   Expression   QI: Expression 2. Frequently exhibits difficulty with expressing needs and ideas   Memory   Short-Term Impaired   Long Term Impaired   Recalls Precaution No   Findings he does not identify where he is, he does not retain why he is in rehab, he does not retain the purpose of therapy   RLE Assessment   RLE Assessment   (gross 4/5 mmt)   LLE Assessment   LLE Assessment   (gross 4/5 mmt)   Cognition   Overall Cognitive Status Impaired   Arousal/Participation Alert;Lethargic;Cooperative   Attention Attends with cues to redirect   Orientation Level Disoriented to place;Disoriented to time;Disoriented to situation   Memory Decreased recall of biographical information;Decreased short term memory;Decreased recall of precautions;Decreased recall of recent events   Following Commands Follows one step commands with increased time or repetition   Objective Measure   PT Measure(s) 5x STS 51 seconds due to impaired processing. after standing he started moving his arms up and down each time, despite VC to sit " back down and stand back up right away. he needed to push both hands from arm rest of chair to stand more efficiently. He presneted with difficulty following dirctions for this task, stood from regular chair with arm rests. Gait speed faster: 0.62 m/s self selected: 0.59. this indicates he is a limited community ambulator. both outcome measures indicate he is at risk for falls as gait speed is less than 1.0 m/s. this is consistent with qualitative observations of needing Minal for minor LOB with walking and turns.   PT Findings he reports at home he likes to read, watch games, and spend time with his wife and dog. he doesnt endorse hobbies, activiites, other things he likes to do   Therapeutic Exercise   Therapeutic Exercise/Activity Nu Step, level 3-4 for 5 minutes, short rest break, level 4 for another 5 minutes, SPM 45-50 for the 10 minutes.   Discharge Information   Patient's Discharge Plan home with family   Patient's Rehab Expectations to go home   Barriers to Discharge Home Limited Family Support;Decreased Cognitive Function;Decreased Strength;Decreased Endurance;Safety Considerations  (ISAIAH, steps inside, dec insight into deficits, dec problem solving, sequencing, memory, comprehension of current situation)   Impressions Alexis is a 65 y/o male with PMH including DM, HLD, HTN, GERD, liver disease and sleep apnea, who presented to hospital on 3/29 for increased confusion. 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 Today he presents to [facility] with current physiological deficits of: dec strength, dec balance, dec rightring reactions, dec activity tolerance, dec insight into safety precautions/fall precatuions, dec comprehension, dec memory (short and long term) with language  deficits, dec problem solving, dec sequencing, dec understanding of current medical and therapy situation. Overall he is able to ambulate/perform functional mobiliity at Maddy level, and at times able to perform at CGA,however gait belt used during entire session due to his dec righitng reactions and limited insight into safety, therefore he doesnt avoid situations that can increase his fall risk. He has difficulty following commands, and needs frequent VC. He presents with a flat affect and can be challenging to engage at times, which seems to be a combination of memory, language and cognitive deficits. From a PT perspective, estimating 7-10 day LOS for him to be a consistent S level for mobility. Due to deficits of safety, recommending S level as anticipating he will need direct Supervision and VC to complete all tasks.  Due to his limited comprehension of current situation, anticipating he will not be able to execute problem solving skills that are needed for him to be Constance. WIll need to talk wtih family to discuss overall goals of care, LOS and help them be prepared for d/c.   PT Therapy Minutes   PT Time In 1230   PT Time Out 1330   PT Total Time (minutes) 60   PT Mode of treatment - Individual (minutes) 60   PT Mode of treatment - Concurrent (minutes) 0   PT Mode of treatment - Group (minutes) 0   PT Mode of treatment - Co-treat (minutes) 0   PT Mode of Treatment - Total time(minutes) 60 minutes   PT Cumulative Minutes 60   Cumulative Minutes   Cumulative therapy minutes 195

## 2025-05-08 NOTE — ASSESSMENT & PLAN NOTE
> Pet met sepsis criteria on 4/29 with confusion and agitation. UA was positive. Urine culture and blood cultures showed Ecoli sensitive to cephalosporins.  > ID consulted and pt completed 7d course of ceftriaxone  > Approved to restart chemo 5/2.    - Monitor for any recurrence of agitation or new fevers/signs of infection

## 2025-05-08 NOTE — PROGRESS NOTES
SLP Cognitive Assessment       05/08/25 2668   Pain Assessment   Pain Assessment Tool 0-10   Pain Score No Pain   Restrictions/Precautions   Precautions Bed/chair alarms;Cognitive;Fall Risk;Impulsive;Multiple lines;Supervision on toilet/commode   Cognitive Linguisitic Assessments   Cognitive Linguistic Quick Test (CLQT) Pt only able to complete informal cognitive assessment. Refer to below for full details.   Comprehension   Assist Devices Glasses   Auditory Basic   Visual Basic   Findings Pt only able to complete informal cognitive assessment. Refer to below for full details.   QI: Comprehension 2. Sometimes Understands: Understands only basic conversations or simple, direct phrases. Frequently requires cues to understand   Comprehension (FIM) 3 - Understands basic info/conversation 50-74% of time   Expression   Verbal Basic   Non-Verbal Basic   Intelligibility Sentence   Findings Pt only able to complete informal cognitive assessment. Refer to below for full details.   QI: Expression 3. Exhibits some difficulty with expressing needs and ideas (e.g., some words or finishing thoughts) or speech is not clear   Expression (FIM) 3 - Expresses basic info/needs 50-74% of time   Social Interaction   Cooperation with staff   Participation Individual   Behaviors observed Appropriate  (flat affect)   Findings Pt only able to complete informal cognitive assessment. Refer to below for full details.   Social Interaction (FIM) 5 - Interacts appropriately with others 90% of time   Problem Solving   Complex Manages finances;Manages discharge planning;Manages medications   Findings Pt only able to complete informal cognitive assessment. Refer to below for full details.   Problem solving (FIM) 1 - Needs direction nearly all the time   Memory   Recognize People No   Remember Routine No   Initiates Tasks Yes   Short-Term Impaired   Long Term Impaired   Recalls Precaution No   Findings Pt only able to complete informal cognitive  "assessment. Refer to below for full details.   Memory (FIM) 1 - Recognizes, recalls/performs less than 25%   Speech/Language/Cognition Assessmetn   Treatment Assessment Pt completed the Informal Cognitive Assessment. Results are as follows with the below sections addressed:    Orientation Information:    Person: +   Place: + \"hospital\" and + city, but verbal binary choices for name of hospital    Time: - for month/year, needed directive cues for full date   Situation: -    LTM Biographical Information:              : + only month/date cues for year              Age: -              Marital Status/Name of Significant Other: + Spouse - Naldo              Children/Names: reported 3 children: Elizabeth (who is listed at primary contact), Drew and Marina              Address: -      Short Term Memory Recall:              Given 4 words:                          Immediate Recall: 4/4                          Delayed Recall 15 seconds: 4/4                          Delayed Recall 1-5 minutes: 0/4                   Recall of recent meal: difficulty in recall vs naming items     Auditory Recall of Short Paragraph: 2/5     Executive Function Tasks:               Convergent Categories: 2/4              Divergent Categories: 2/4                Organization: 1/4     Auditory Problem Solvin/5    Auditory Comprehension of Commands:    One-Step: 3/3   Two-Step:    Three-Step:       Overall pt is demonstrating severely impaired deficits noted in the following areas: decreased attention, LT memory recall, ST memory recall , problem solving, reasoning, sequencing, organization of thoughts, judgement, slower processing, insight, and as well as likely deficits in language skills. Pt's overall attention towards tasks did fluctuate throughout assessment, but able to be redirected to tasks at hand. Throughout orientation and LT memory biographical review, pt exhibiting more deficits given cognitive skills, but when initiating more " structured tasks, pt demonstrating more word finding deficits, as well as perseverations on words provided. Question true language skills vs cognitive deficits, most likely combination of both, currently impacting pt's overall cognitive functioning. In order to address the noted barriers, skilled SLP services will address this by targeting the following interventions: visual orientation checklist, memory book/memory packet, verbal problem solving task, visual memory recall tasks, drawing conclusions activities, written sequencing tasks, categorization tasks, picture problem solving activities, functional reading tasks, word deduction tasks and family education/training.  At this time, pt will benefit from skilled SLP services targeting functional independence of cognitive linguistic skills in attempts to decrease need for caregiver burden over time.   SLP Therapy Minutes   SLP Time In 0845   SLP Time Out 0930   SLP Total Time (minutes) 45   SLP Mode of treatment - Individual (minutes) 45   SLP Mode of treatment - Concurrent (minutes) 0   SLP Mode of treatment - Group (minutes) 0   SLP Mode of treatment - Co-treat (minutes) 0   SLP Mode of Treatment - Total time(minutes) 45 minutes   SLP Cumulative Minutes 45   Therapy Time missed   Time missed? No

## 2025-05-08 NOTE — CONSULTS
Consultation - Oncology-Medical   Name: Alexis Dennison 64 y.o. male I MRN: [id number]  Unit/Bed#: -01 I Date of Admission: 5/7/2025   Date of Service: 5/8/2025 I Hospital Day: 1   Inpatient consult to Oncology  Consult performed by: Nicki Renteria MD  Consult ordered by: PATI Porras        Physician Requesting Evaluation: Crispin Arana MD   Reason for Evaluation / Principal Problem: CNS lymphoma.    Assessment & Plan  Primary central nervous system (CNS) lymphoma  64 yoM with PMHx of DM2, NAFLD, and HT who presented with progressive encephalopathy found to have multiple scattered nodular cerebral and cerebellar enhancement who underwent two non-diagnostic LPs prompting stereotactic brain biopsy (4/14) which showed large B-cell lymphoma. See oncology progress note on 4/21 for full diagnostic work up. On 4/29, agitation led to infectious work up with showed UA+ and BCx with 2/2 E. Coli sensitive to ceftriaxone. ID was consulted is managing and has approved to restart chemo 5/2.      Treatment Plan: regimen modified from (https://pubmed.ncbi.nlm.nih.gov/98892603/)     High-dose methotrexate every 2 weeks x4 cycles followed by every 4 weeks maintenance (could consider dosing every 4 weeks if he discharges to outside rehab)  C1 (4/17) 8 g/m²  C2 (5/2) 3 g/m², dose-reduced due to LETY, delayed x1 day due to E. coli bacteremia   Urine pH remained above 7.0.  Methotrexate level 24 hours post high-dose methotrexate, from 5/3/2025 was less than 0.10. Repeat levels on 5/5/25 at 0.36. Leucovorin, bicarb gtt was restarted.  Methotrexate level from this morning less than 0.10, no need to further monitor methotrexate level or urine pH.  Patient is due for Rituxan on 5/10/2025, will coordinate same.  Thyroid nodule  Patient incidentally found to have thyroid nodule, was recommended ultrasound thyroid.  Pulmonary nodule  CTA from 3/29/2025 with nonspecific 4 mm right lower lobe pulmonary nodule, recommended 3  months follow-up.  Transaminitis  Patient noted with transaminitis, LFTs has been fluctuating, more recently this morning noted , .  Could be due to recent chemotherapy versus antibiotics versus liver lesions.  Monitor CMP in a.m., if continue to trend up will need repeat imaging.      History of Present Illness   Alexis Dennison is a 64 y.o. male who presents with recently diagnosed CNS lymphoma.  Patient was admitted on 3/29 at [facility] ED where he presented with altered mental status and memory difficulty.  CT head on 3/24 demonstrated multiple hypodense ependymal and subependymal nodules.  MRI brain performed demonstrating multiple scattered foci of subependymal nodular enhancement.  Patient had LP on 3/31 and 4/7 both were negative/nondiagnostic.  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  Status post stereotactic biopsy on 4/14/2025 that showed large B-cell lymphoma.  Patient evaluated by our team and started on high-dose methotrexate every 2 weeks for 4 cycles followed by every 4 weeks maintenance in addition to weekly rituximab.  Patient is discharged to rehab.    Oncology team was consulted as we were monitoring his methotrexate level and until yesterday he was receiving leucovorin, we reviewed labs today his methotrexate level is less than 0.10, no need to further monitor methotrexate level or urine pH.  Patient continues with 1-1 but appears pleasant today.      Review of Systems   Constitutional:  Negative for activity change, diaphoresis and unexpected weight change.   Respiratory:  Negative for chest tightness, shortness of breath and wheezing.    Cardiovascular:  Negative for chest pain and palpitations.    Gastrointestinal:  Negative for abdominal pain, blood in stool and nausea.   Musculoskeletal:  Negative for back pain and myalgias.   All other systems reviewed and are negative.    Historical Information   Historical Information   Past Medical History:   Diagnosis Date    Colon polyp     Diabetes mellitus (HCC)     GERD (gastroesophageal reflux disease)     Hyperlipidemia     Hypertension     Liver disease     Sleep apnea      Past Surgical History:   Procedure Laterality Date    COLONOSCOPY      CYST REMOVAL      back    FL LUMBAR PUNCTURE DIAGNOSTIC  3/31/2025    FL LUMBAR PUNCTURE DIAGNOSTIC  4/7/2025    WI EXC B9 LESION MRGN XCP SK TG T/A/L 0.6-1.0 CM N/A 6/7/2023    Procedure: EXCISION  BIOPSY LESION/MASS ABDOMINAL/CHEST WALL;  Surgeon: Trevor Villavicencio MD;  Location: [facility];  Service: General    THIRD VENTRICULOSTOMY Left 4/14/2025    Procedure: Left frontal endoscopic biopsy of ventricular mass and placement of EVD;  Surgeon: Paul Causey MD;  Location: [facility];  Service: Neurosurgery     Social History     Tobacco Use    Smoking status: Never    Smokeless tobacco: Never   Vaping Use    Vaping status: Never Used   Substance and Sexual Activity    Alcohol use: Yes     Alcohol/week: 1.0 standard drink of alcohol     Types: 1 Cans of beer per week     Comment: socially    Drug use: Never    Sexual activity: Not Currently     Partners: Female     E-Cigarette/Vaping    E-Cigarette Use Never User      E-Cigarette/Vaping Substances    Nicotine No     THC No     CBD No     Flavoring No     Other No     Unknown No      Family History   Problem Relation Age of Onset    Cancer Mother     Cancer Father     Diabetes Paternal Grandfather     Diabetes unspecified Paternal Grandfather         Type 2    Colon polyps Neg Hx     Colon cancer Neg Hx      Social History     Tobacco Use    Smoking status: Never    Smokeless tobacco: Never   Vaping Use    Vaping status: Never Used   Substance and Sexual Activity     Alcohol use: Yes     Alcohol/week: 1.0 standard drink of alcohol     Types: 1 Cans of beer per week     Comment: socially    Drug use: Never    Sexual activity: Not Currently     Partners: Female     Patient has no known allergies.    Oncology History:   Cancer Staging   No matching staging information was found for the patient.    Oncology History   Primary CNS lymphoma   4/16/2025 Initial Diagnosis    Primary CNS lymphoma     4/17/2025 -  Chemotherapy    leucovorin (WELLCOVORIN), 25 mg, 2 of 8 cycles  Administration: 25 mg (4/19/2025), 25 mg (4/19/2025), 25 mg (4/20/2025), 25 mg (4/20/2025), 25 mg (4/20/2025), 25 mg (4/21/2025)  leucovorin calcium IVPB, 25 mg, 2 of 8 cycles  Administration: 25 mg (5/3/2025), 25 mg (5/3/2025), 25 mg (5/4/2025), 25 mg (5/4/2025), 25 mg (5/4/2025)  methotrexate (PF) IVPB, 16,320 mg, 2 of 8 cycles  Dose modification: 8,000 mg/m2 (original dose 3,500 mg/m2, Cycle 2, Reason: Other (Must fill in a comment), Comment: Patient with aggressive PCNSL), 3,000 mg/m2 (original dose 3,500 mg/m2, Cycle 2, Reason: Other (Must fill in a comment), Comment: Elevated SCr)  Administration: 6,000 mg (5/2/2025), 16,000 mg (4/18/2025)  riTUXimab (RITUXAN) first titrated chemo infusion, 765 mg (100 % of original dose 375 mg/m2), 2 of 8 cycles  Dose modification: 375 mg/m2 (original dose 375 mg/m2, Cycle 1)  Administration: 800 mg (4/17/2025), 700 mg (4/24/2025), 800 mg (5/3/2025)  riTUXimab-ABBS (TRUXIMA) first titrated infusion, 375 mg/m2 = 765 mg, 1 of 1 cycle       Current Facility-Administered Medications   Medication Dose Route Frequency Provider Last Rate Last Admin    acetaminophen (TYLENOL) tablet 650 mg  650 mg Oral Q6H PRN Crispin Arana MD        allopurinol (ZYLOPRIM) tablet 300 mg  300 mg Oral Daily Crispin Aarna MD   300 mg at 05/08/25 0801    alteplase (CATHFLO) injection 2 mg  2 mg Intracatheter Q1MIN PRN Crispin Arana MD        aluminum-magnesium hydroxide-simethicone (MAALOX) oral  suspension 30 mL  30 mL Oral Q4H PRN Crispin Arana MD        atorvastatin (LIPITOR) tablet 20 mg  20 mg Oral Daily Crispin Arana MD   20 mg at 05/08/25 0801    bisacodyl (DULCOLAX) rectal suppository 10 mg  10 mg Rectal Daily PRN Jessica Arreola DO        calcium carbonate (TUMS) chewable tablet 1,000 mg  1,000 mg Oral Daily PRN Crispin Arana MD        chlorhexidine (PERIDEX) 0.12 % oral rinse 15 mL  15 mL Mouth/Throat Q12H [facility] Crispin Arana MD   15 mL at 05/08/25 0801    dexamethasone (DECADRON) tablet 4 mg  4 mg Oral Q12H [facility] Crispin Arana MD   4 mg at 05/08/25 0801    Followed by    [START ON 5/11/2025] dexamethasone (DECADRON) tablet 2 mg  2 mg Oral Q12H CAIT Arana MD        Followed by    [START ON 5/15/2025] dexamethasone (DECADRON) tablet 1 mg  1 mg Oral Q12H [facility] Crispin Arana MD        docusate sodium (COLACE) capsule 100 mg  100 mg Oral BID Jessica Arreola DO   100 mg at 05/08/25 1707    heparin (porcine) subcutaneous injection 5,000 Units  5,000 Units Subcutaneous Q8H [facility] Crispin Arana MD   5,000 Units at 05/08/25 1428    insulin glargine (LANTUS) subcutaneous injection 10 Units 0.1 mL  10 Units Subcutaneous HS Crispin Arana MD   10 Units at 05/07/25 2156    insulin lispro (HumALOG/ADMELOG) 100 units/mL subcutaneous injection 2-12 Units  2-12 Units Subcutaneous 4x Daily (AC & HS) Crispin Arana MD   2 Units at 05/08/25 1650    insulin lispro (HumALOG/ADMELOG) 100 units/mL subcutaneous injection 3 Units  3 Units Subcutaneous TID With Meals PATI Porras   3 Units at 05/08/25 1651    melatonin tablet 6 mg  6 mg Oral HS Crispin Arana MD   6 mg at 05/07/25 2216    metFORMIN (GLUCOPHAGE-XR) 24 hr tablet 1,000 mg  1,000 mg Oral Daily Crispin Arana MD   1,000 mg at 05/08/25 0804    OLANZapine (ZyPREXA) IM injection 5 mg  5 mg Intramuscular BID PRN Crispin Arana MD        ondansetron (ZOFRAN) injection 4 mg  4 mg Intravenous Q6H PRN Crispin Arana MD        oxyCODONE  (ROXICODONE) split tablet 2.5 mg  2.5 mg Oral Q4H PRN Crispin Arana MD        Or    oxyCODONE (ROXICODONE) IR tablet 5 mg  5 mg Oral Q4H PRN Crispin Arana MD        pantoprazole (PROTONIX) EC tablet 40 mg  40 mg Oral Early Morning Crispin Arana MD   40 mg at 05/08/25 0543    polyethylene glycol (MIRALAX) packet 17 g  17 g Oral Daily PRN Jessica rAreola, DO        QUEtiapine (SEROquel) tablet 12.5 mg  12.5 mg Oral Daily Crispin Arana MD   12.5 mg at 05/08/25 1129    QUEtiapine (SEROquel) tablet 50 mg  50 mg Oral HS Crispin Arana MD        [START ON 5/9/2025] senna (SENOKOT) tablet 17.2 mg  2 tablet Oral Daily With Lunch Jessica Arreola,         sodium chloride 0.9 % infusion  20 mL/hr Intravenous Once PRN Crispin Arana MD           Objective :  Temp:  [97.8 °F (36.6 °C)-98.4 °F (36.9 °C)] 98.4 °F (36.9 °C)  HR:  [56-71] 71  BP: (121-131)/(69-71) 131/71  Resp:  [16-18] 16  SpO2:  [96 %-98 %] 96 %  O2 Device: None (Room air)    Physical Exam  Constitutional:       Appearance: Normal appearance.   HENT:      Head: Normocephalic and atraumatic.      Mouth/Throat:      Mouth: Mucous membranes are moist.      Pharynx: Oropharynx is clear.   Eyes:      Conjunctiva/sclera: Conjunctivae normal.      Pupils: Pupils are equal, round, and reactive to light.   Cardiovascular:      Rate and Rhythm: Normal rate and regular rhythm.      Pulses: Normal pulses.      Heart sounds: Normal heart sounds.   Pulmonary:      Effort: Pulmonary effort is normal.   Abdominal:      General: Abdomen is flat. Bowel sounds are normal.   Musculoskeletal:         General: Normal range of motion.   Skin:     General: Skin is warm and dry.   Neurological:      Mental Status: He is alert.           Lab Results: I have reviewed the following results:  Lab Results   Component Value Date    K 4.1 05/08/2025     05/08/2025    CO2 32 05/08/2025    BUN 36 (H) 05/08/2025    CREATININE 1.23 05/08/2025    GLUF 206 (H) 05/08/2025    CALCIUM 8.5  05/08/2025    CORRECTEDCA 9.2 05/08/2025     (H) 05/08/2025     (H) 05/08/2025    ALKPHOS 53 05/08/2025    EGFR 61 05/08/2025     Lab Results   Component Value Date    WBC 2.63 (L) 05/08/2025    HGB 8.6 (L) 05/08/2025    HCT 24.2 (L) 05/08/2025    MCV 87 05/08/2025     05/08/2025     Lab Results   Component Value Date    NEUTROABS 1.95 05/05/2025       Imaging Results Review: I reviewed radiology reports from this admission including: MRI brain.  Other Study Results Review: No additional pertinent studies reviewed.

## 2025-05-08 NOTE — PROGRESS NOTES
Progress Note - Oncology-Medical   Name: Alexis Dennison 64 y.o. male I MRN: [id number]  Unit/Bed#: [facility] 904-01 I Date of Admission: 3/29/2025   Date of Service: 5/7/2025 I Hospital Day: 39     Assessment & Plan  Primary CNS lymphoma  64 yoM with PMHx of DM2, NAFLD, and HT who presented with progressive encephalopathy found to have multiple scattered nodular cerebral and cerebellar enhancement who underwent two non-diagnostic LPs prompting stereotactic brain biopsy (4/14) which showed large B-cell lymphoma. See oncology progress note on 4/21 for full diagnostic work up. On 4/29, agitation led to infectious work up with showed UA+ and BCx with 2/2 E. Coli sensitive to ceftriaxone. ID was consulted is managing and has approved to restart chemo 5/2.     Treatment Plan: regimen modified from (https://pubmed.ncbi.nlm.nih.gov/48640891/)    High-dose methotrexate every 2 weeks x4 cycles followed by every 4 weeks maintenance (could consider dosing every 4 weeks if he discharges to outside rehab)  C1 (4/17) 8 g/m²  C2 (5/2) 3 g/m², dose-reduced due to LETY, delayed x1 day due to E. coli bacteremia   Urine pH remained above 7.0.  Methotrexate level 24 hours post high-dose methotrexate, from 5/3/2025 was less than 0.10. Repeat levels on 5/5/25 at 0.36. Leucovorin, bicarb gtt was restarted.  MTX level from today pending.UA Ph 8.0.  Will monitor urine pH and MTX level.   E coli bacteremia  Patient met sepsis criteria on 4/29 with accompanying confusion and agitation.  UA was positive, UCx and BCx showed E. Coli sensitive to cephalosporins.  ID was consulted, is managing, and has approved to restarting chemo 5/2.  Pulmonary nodule  CTA 3/29 with nonspecific 4 mm RLL pulm nodule, recommendation for 3 month follow up  Liver lesion  CTA 3/29 with nonspecific 12mm hypodense right hepatic lesion, recommended MRI abdomen  MRI 3/30 with no suspicious hepatic lesions, demonstrated simple right hepatic lobe cyst      Outpatient follow up  plan: To be decided.      Communication with patient/family:  I did update the patient.   Communication with team:  Did communicate with primary team.   I did review this patient with Dr. Dumont and they are in agreement with this plan.          Subjective:  Patient seen at bedside, continues one-on-one, remains calm.  No overnight events reported.    Review of Systems   Constitutional:  Negative for activity change, appetite change, fever and unexpected weight change.   Respiratory:  Negative for cough and chest tightness.    Cardiovascular:  Negative for palpitations.   Gastrointestinal:  Negative for abdominal pain, constipation and diarrhea.          Objective:     Medication Administration - last 24 hours from 05/06/2025 2053 to 05/07/2025 2053         Date/Time Order Dose Route Action Action by     05/07/2025 0801 EDT atorvastatin (LIPITOR) tablet 20 mg 20 mg Oral Given Ngozi Vale RN     05/06/2025 2131 EDT senna (SENOKOT) tablet 17.2 mg 17.2 mg Oral Not Given Aida Berkowitz     05/07/2025 1651 EDT bisacodyl (DULCOLAX) rectal suppository 10 mg 10 mg Rectal Given Ngozi Vale RN     05/07/2025 0803 EDT bisacodyl (DULCOLAX) rectal suppository 10 mg 0 mg Rectal Hold Ngozi Vale RN     05/07/2025 0801 EDT chlorhexidine (PERIDEX) 0.12 % oral rinse 15 mL 15 mL Mouth/Throat Given Ngozi Vale RN     05/06/2025 2132 EDT chlorhexidine (PERIDEX) 0.12 % oral rinse 15 mL 15 mL Mouth/Throat Given Aida Berkowitz     05/07/2025 0553 EDT pantoprazole (PROTONIX) EC tablet 40 mg 40 mg Oral Given Aida Berkowitz     05/07/2025 1813 EDT alteplase (CATHFLO) injection 2 mg -- Intracatheter MAR Unhold Automatic Discharge Provider     05/07/2025 1806 EDT alteplase (CATHFLO) injection 2 mg -- Intracatheter MAR Hold Background, [place]     05/07/2025 0801 EDT allopurinol (ZYLOPRIM) tablet 300 mg 300 mg Oral Given Ngozi Vale RN     05/07/2025 1355 EDT heparin (porcine) subcutaneous injection 5,000 Units 5,000  Units Subcutaneous Given Ngozi Vale RN     05/07/2025 0553 EDT heparin (porcine) subcutaneous injection 5,000 Units 5,000 Units Subcutaneous Given Aida Berkowitz     05/06/2025 2132 EDT heparin (porcine) subcutaneous injection 5,000 Units 5,000 Units Subcutaneous Given Aida Berkowitz     05/06/2025 2132 EDT insulin glargine (LANTUS) subcutaneous injection 10 Units 0.1 mL 10 Units Subcutaneous Given Aida Berkowitz     05/07/2025 1222 EDT QUEtiapine (SEROquel) tablet 12.5 mg 12.5 mg Oral Given Ngozi Vale RN     05/07/2025 1813 EDT sodium chloride 0.9 % infusion -- Intravenous MAR Unhold Automatic Discharge Provider     05/07/2025 1806 EDT sodium chloride 0.9 % infusion -- Intravenous MAR Hold Background, [place]     05/06/2025 2132 EDT QUEtiapine (SEROquel) tablet 50 mg 50 mg Oral Given Aida Berkowitz     05/07/2025 1355 EDT dexamethasone (DECADRON) tablet 4 mg 4 mg Oral Given Ngozi Vale RN     05/07/2025 0553 EDT dexamethasone (DECADRON) tablet 4 mg 4 mg Oral Given Aidaromán Berkowitz     05/06/2025 2132 EDT dexamethasone (DECADRON) tablet 4 mg 4 mg Oral Given Aida Berkowitz     05/07/2025 1705 EDT insulin lispro (HumALOG/ADMELOG) 100 units/mL subcutaneous injection 2-12 Units 6 Units Subcutaneous Given Annamarie Sandoval RN     05/07/2025 1222 EDT insulin lispro (HumALOG/ADMELOG) 100 units/mL subcutaneous injection 2-12 Units 8 Units Subcutaneous Given Ngozi Vale RN     05/07/2025 0801 EDT insulin lispro (HumALOG/ADMELOG) 100 units/mL subcutaneous injection 2-12 Units 4 Units Subcutaneous Given Ngozi Vale RN     05/06/2025 2132 EDT insulin lispro (HumALOG/ADMELOG) 100 units/mL subcutaneous injection 2-12 Units 6 Units Subcutaneous Given Aida Berkowitz     05/07/2025 1706 EDT insulin lispro (HumALOG/ADMELOG) 100 units/mL subcutaneous injection 8 Units 8 Units Subcutaneous Given Annamarie Sandoval RN     05/07/2025 1222 EDT insulin lispro (HumALOG/ADMELOG) 100 units/mL subcutaneous injection 8  "Units 8 Units Subcutaneous Given Ngozi Vale RN     05/07/2025 0802 EDT insulin lispro (HumALOG/ADMELOG) 100 units/mL subcutaneous injection 8 Units 8 Units Subcutaneous Given Ngozi Vale RN     05/07/2025 1355 EDT leucovorin 25 mg in dextrose 5 % 50 mL IVPB 25 mg Intravenous New Bag Ngozi Vale RN     05/07/2025 0803 EDT leucovorin 25 mg in dextrose 5 % 50 mL IVPB 25 mg Intravenous New Bag Ngozi Vale, ADELINE     05/07/2025 0129 EDT leucovorin 25 mg in dextrose 5 % 50 mL IVPB 25 mg Intravenous New Bag Aida Sho     05/07/2025 0049 EDT leucovorin 25 mg in dextrose 5 % 50 mL IVPB 0 mg Intravenous Hold Aida Psaila     05/06/2025 2301 EDT sodium bicarbonate 100 mEq in dextrose 5 % 1,000 mL infusion 0 mL/hr Intravenous Stopped Aida Psaila     05/06/2025 2143 EDT sodium bicarbonate 100 mEq in dextrose 5 % 1,000 mL infusion 125 mL/hr Intravenous New Bag Aida Janetteila     05/07/2025 1805 EDT sodium bicarbonate 100 mEq in dextrose 5 % 1,000 mL infusion 0 mL/hr Intravenous Stopped Ngozi Vale RN     05/07/2025 1017 EDT sodium bicarbonate 100 mEq in dextrose 5 % 1,000 mL infusion 100 mL/hr Intravenous Rate/Dose Change Ngozi Vale RN     05/07/2025 0617 EDT sodium bicarbonate 100 mEq in dextrose 5 % 1,000 mL infusion 125 mL/hr Intravenous New Bag Aida Psaila     05/06/2025 2301 EDT sodium bicarbonate 100 mEq in dextrose 5 % 1,000 mL infusion 125 mL/hr Intravenous New Bag Aida Psaila            /68   Pulse 60   Temp 98.4 °F (36.9 °C)   Resp 17   Ht 5' 10\" (1.778 m)   Wt 75.5 kg (166 lb 7.2 oz)   SpO2 98%   BMI 23.88 kg/m²       Physical Exam  Constitutional:       Appearance: Normal appearance.   Cardiovascular:      Rate and Rhythm: Normal rate and regular rhythm.      Pulses: Normal pulses.      Heart sounds: Normal heart sounds.   Pulmonary:      Effort: Pulmonary effort is normal.      Breath sounds: Normal breath sounds.   Neurological:      Mental Status: He is " alert.      Comments: Awake, follows simple commands.           Recent Results (from the past 48 hours)   Basic metabolic panel    Collection Time: 05/06/25  5:00 AM   Result Value Ref Range    Sodium 142 135 - 147 mmol/L    Potassium 4.4 3.5 - 5.3 mmol/L    Chloride 105 96 - 108 mmol/L    CO2 32 21 - 32 mmol/L    ANION GAP 5 4 - 13 mmol/L    BUN 36 (H) 5 - 25 mg/dL    Creatinine 1.58 (H) 0.60 - 1.30 mg/dL    Glucose 176 (H) 65 - 140 mg/dL    Calcium 8.5 8.4 - 10.2 mg/dL    eGFR 45 ml/min/1.73sq m   Fingerstick Glucose (POCT)    Collection Time: 05/06/25  8:24 AM   Result Value Ref Range    POC Glucose 239 (H) 65 - 140 mg/dl   Fingerstick Glucose (POCT)    Collection Time: 05/06/25 11:13 AM   Result Value Ref Range    POC Glucose 182 (H) 65 - 140 mg/dl   UA (URINE) with reflex to Scope    Collection Time: 05/06/25 12:22 PM   Result Value Ref Range    Color, UA Light Yellow     Clarity, UA Clear     Specific Gravity, UA 1.009 1.003 - 1.030    pH, UA 7.5 4.5, 5.0, 5.5, 6.0, 6.5, 7.0, 7.5, 8.0    Leukocytes, UA Negative Negative    Nitrite, UA Negative Negative    Protein, UA Negative Negative mg/dl    Glucose,  (1/5%) (A) Negative mg/dl    Ketones, UA Negative Negative mg/dl    Urobilinogen, UA <2.0 <2.0 mg/dl mg/dl    Bilirubin, UA Negative Negative    Occult Blood, UA Negative Negative   Fingerstick Glucose (POCT)    Collection Time: 05/06/25  3:41 PM   Result Value Ref Range    POC Glucose 283 (H) 65 - 140 mg/dl   Methotrexate level    Collection Time: 05/06/25  3:56 PM   Result Value Ref Range    Methotrexate Lvl 0.13 umol/L   UA (URINE) with reflex to Scope    Collection Time: 05/06/25  4:52 PM   Result Value Ref Range    Color, UA Light Yellow     Clarity, UA Clear     Specific Gravity, UA 1.010 1.003 - 1.030    pH, UA 7.0 4.5, 5.0, 5.5, 6.0, 6.5, 7.0, 7.5, 8.0    Leukocytes, UA Negative Negative    Nitrite, UA Negative Negative    Protein, UA Negative Negative mg/dl    Glucose,  (1/2%) (A) Negative  mg/dl    Ketones, UA Negative Negative mg/dl    Urobilinogen, UA <2.0 <2.0 mg/dl mg/dl    Bilirubin, UA Negative Negative    Occult Blood, UA Small (A) Negative   Urine Microscopic    Collection Time: 05/06/25  4:52 PM   Result Value Ref Range    RBC, UA 10-20 (A) None Seen, 1-2 /hpf    WBC, UA 1-2 None Seen, 1-2 /hpf    Epithelial Cells Occasional None Seen, Occasional /hpf    Bacteria, UA None Seen None Seen, Occasional /hpf   Fingerstick Glucose (POCT)    Collection Time: 05/06/25  9:31 PM   Result Value Ref Range    POC Glucose 258 (H) 65 - 140 mg/dl   UA (URINE) with reflex to Scope    Collection Time: 05/06/25 11:04 PM   Result Value Ref Range    Color, UA Colorless     Clarity, UA Clear     Specific Gravity, UA 1.009 1.003 - 1.030    pH, UA 8.0 4.5, 5.0, 5.5, 6.0, 6.5, 7.0, 7.5, 8.0    Leukocytes, UA Negative Negative    Nitrite, UA Negative Negative    Protein, UA Negative Negative mg/dl    Glucose,  (1/2%) (A) Negative mg/dl    Ketones, UA Negative Negative mg/dl    Urobilinogen, UA <2.0 <2.0 mg/dl mg/dl    Bilirubin, UA Negative Negative    Occult Blood, UA Negative Negative   Urine Microscopic    Collection Time: 05/06/25 11:04 PM   Result Value Ref Range    RBC, UA None Seen None Seen, 1-2 /hpf    WBC, UA 1-2 None Seen, 1-2 /hpf    Epithelial Cells Occasional None Seen, Occasional /hpf    Bacteria, UA None Seen None Seen, Occasional /hpf   CBC    Collection Time: 05/07/25  4:43 AM   Result Value Ref Range    WBC 3.11 (L) 4.31 - 10.16 Thousand/uL    RBC 2.86 (L) 3.88 - 5.62 Million/uL    Hemoglobin 8.7 (L) 12.0 - 17.0 g/dL    Hematocrit 25.3 (L) 36.5 - 49.3 %    MCV 89 82 - 98 fL    MCH 30.4 26.8 - 34.3 pg    MCHC 34.4 31.4 - 37.4 g/dL    RDW 11.4 (L) 11.6 - 15.1 %    Platelets 213 149 - 390 Thousands/uL    MPV 10.3 8.9 - 12.7 fL   Phosphorus    Collection Time: 05/07/25  4:43 AM   Result Value Ref Range    Phosphorus 3.2 2.3 - 4.1 mg/dL   Magnesium    Collection Time: 05/07/25  4:43 AM   Result  Value Ref Range    Magnesium 2.0 1.9 - 2.7 mg/dL   Basic metabolic panel    Collection Time: 05/07/25  4:43 AM   Result Value Ref Range    Sodium 138 135 - 147 mmol/L    Potassium 4.3 3.5 - 5.3 mmol/L    Chloride 99 96 - 108 mmol/L    CO2 34 (H) 21 - 32 mmol/L    ANION GAP 5 4 - 13 mmol/L    BUN 40 (H) 5 - 25 mg/dL    Creatinine 1.36 (H) 0.60 - 1.30 mg/dL    Glucose 266 (H) 65 - 140 mg/dL    Calcium 8.4 8.4 - 10.2 mg/dL    eGFR 54 ml/min/1.73sq m   UA (URINE) with reflex to Scope    Collection Time: 05/07/25  6:15 AM   Result Value Ref Range    Color, UA Colorless     Clarity, UA Clear     Specific Gravity, UA 1.008 1.003 - 1.030    pH, UA 8.0 4.5, 5.0, 5.5, 6.0, 6.5, 7.0, 7.5, 8.0    Leukocytes, UA Negative Negative    Nitrite, UA Negative Negative    Protein, UA Negative Negative mg/dl    Glucose,  (1/2%) (A) Negative mg/dl    Ketones, UA Negative Negative mg/dl    Urobilinogen, UA <2.0 <2.0 mg/dl mg/dl    Bilirubin, UA Negative Negative    Occult Blood, UA Negative Negative   Urine Microscopic    Collection Time: 05/07/25  6:15 AM   Result Value Ref Range    RBC, UA 1-2 None Seen, 1-2 /hpf    WBC, UA 1-2 None Seen, 1-2 /hpf    Epithelial Cells None Seen None Seen, Occasional /hpf    Bacteria, UA Occasional None Seen, Occasional /hpf   Fingerstick Glucose (POCT)    Collection Time: 05/07/25  7:14 AM   Result Value Ref Range    POC Glucose 240 (H) 65 - 140 mg/dl   Fingerstick Glucose (POCT)    Collection Time: 05/07/25 10:53 AM   Result Value Ref Range    POC Glucose 313 (H) 65 - 140 mg/dl   Fingerstick Glucose (POCT)    Collection Time: 05/07/25  3:49 PM   Result Value Ref Range    POC Glucose 250 (H) 65 - 140 mg/dl       CT abdomen pelvis wo contrast  Result Date: 4/30/2025  Narrative: CT ABDOMEN AND PELVIS WITHOUT IV CONTRAST INDICATION: Gram-negative bacteremia, evaluate for source infection. COMPARISON: None. TECHNIQUE: CT examination of the abdomen and pelvis was performed without intravenous contrast.  Multiplanar 2D reformatted images were created from the source data. This examination, like all CT scans performed in the [facility], was performed utilizing techniques to minimize radiation dose exposure, including the use of iterative reconstruction and automated exposure control. Radiation dose length product (DLP) for this visit: 560.94 mGy-cm. Enteric Contrast: Not administered. FINDINGS: ABDOMEN LOWER CHEST: Calcified granuloma in the right middle lobe. LIVER/BILIARY TREE: 1 cm cyst in the right lobe. Liver is otherwise unremarkable.1 GALLBLADDER: No calcified gallstones. No pericholecystic inflammatory change. SPLEEN: Unremarkable. PANCREAS: Unremarkable. ADRENAL GLANDS: Unremarkable. KIDNEYS/URETERS: Unremarkable. No hydronephrosis. STOMACH AND BOWEL: Large amount of formed stool throughout the entire colon. No evidence of obstruction. APPENDIX: Normal. ABDOMINOPELVIC CAVITY: No ascites. No pneumoperitoneum. No lymphadenopathy. VESSELS: Unremarkable for patient's age. PELVIS REPRODUCTIVE ORGANS: Unremarkable for patient's age. URINARY BLADDER: Unremarkable. ABDOMINAL WALL/INGUINAL REGIONS: Mild infiltration of the anterior abdominal wall in the left lower quadrant, possibly a soft tissue contusion. BONES: No acute fracture or suspicious osseous lesion.     Impression: No acute inflammatory process in the abdomen or pelvis. Large amount of stool throughout the colon. Workstation performed: [id number]     XR abdomen obstruction series  Result Date: 4/30/2025  Narrative: XR ABDOMEN OBSTRUCTION SERIES INDICATION: Constipation, lethargy, gram-negative bacteremia. Confusion, newly diagnosed intracranial mass. Constipation. COMPARISON: MRI abdomen 3/30/2025 and CT chest abdomen pelvis 3/29/2025 FINDINGS: Large volume of colonic stool. Otherwise unremarkable bowel gas pattern. No pneumoperitoneum or abnormal air-fluid levels. No pathologic calcification or soft tissue mass. Bones are unremarkable for  patient's age. Examination of the chest reveals clear lungs and a normal cardiomediastinal silhouette. Right PICC with tip projecting over the superior cavoatrial junction.     Impression: 1.  Large colonic stool burden without obstruction or other acute abdominal findings. 2.  No acute cardiopulmonary findings. Resident: CINTHYA FAJARDO I, the attending radiologist, have reviewed the images and agree with the final report above. Workstation performed: [id number]     XR chest portable ICU  Result Date: 4/21/2025  Narrative: XR CHEST PORTABLE ICU INDICATION: ETT placement. COMPARISON: CXR 4/13/2025, chest CT 3/29/2025. FINDINGS: ET tube 3 cm above the arnav. Right PICC in upper right atrium. Clear lungs. No pneumothorax or pleural effusion. Normal cardiomediastinal silhouette. Bones are unremarkable for age. Normal upper abdomen.     Impression: No acute cardiopulmonary disease. ET tube 3 cm above the arnav. Workstation performed: [id number]     CT head wo contrast  Result Date: 4/21/2025  Narrative: CT BRAIN - WITHOUT CONTRAST INDICATION:   s/p EVD removal follow up hydrocephalus. COMPARISON: 4/20/2025 TECHNIQUE:  CT examination of the brain was performed.  Multiplanar 2D reformatted images were created from the source data. Radiation dose length product (DLP) for this visit:  1099 mGy-cm .  This examination, like all CT scans performed in the [facility], was performed utilizing techniques to minimize radiation dose exposure, including the use of iterative reconstruction and automated exposure control. IMAGE QUALITY:  Diagnostic. FINDINGS: PARENCHYMA: Ill-defined mixed density in the left gangliocapsular region corresponds to the lesion seen on prior MRI. Again noted is linear low-attenuation and trace blood products along the ventricular catheter tract, unchanged. VENTRICLES AND EXTRA-AXIAL SPACES: There is increasing ventricular caliber in the interim which is most pronounced involving the left  lateral ventricle. There is no evidence of an acute, decompensated hydrocephalus. Small volume blood products in the occipital horns are decreasing. There is peripheral high attenuation along ventricular system which may correspond to subependymal spread of neoplasm. There is no hydrocephalus. 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 VISUALIZED ORBITS: Normal visualized orbits. PARANASAL SINUSES: Normal visualized paranasal sinuses. CALVARIUM AND EXTRACRANIAL SOFT TISSUES: Left frontal siomara hole     Impression: 1. Increasing ventricular caliber without evidence of an acute, decompensated hydrocephalus. Recommend continued close follow-up. 2. Improving intraventricular blood products. The study was marked in EPIC for immediate notification. Workstation performed: [id number]     MRI cervical spine w wo contrast  Result Date: 4/20/2025  Narrative: MRI CERVICAL, THORACIC AND LUMBAR SPINE WITH AND WITHOUT CONTRAST INDICATION: new b cell lymphoma. COMPARISON:  None. TECHNIQUE:  Multiplanar, multisequence imaging of the total spine was performed before and after gadolinium administration. . IV Contrast:  7 mL of Gadobutrol injection (SINGLE-DOSE) IMAGE QUALITY: Motion-degraded, which reduces diagnostic sensitivity. FINDINGS: ALIGNMENT: Normal alignment of the cervical spine. No acute fracture. Multilevel endplate centered marrow changes. Scattered areas of intrinsic T1/T2 hyperintensity in the vertebral bodies, most consistent with osseous venous malformations/hemangiomas. MARROW SIGNAL: As above. CORD: No cord signal abnormality that can be confirmed in 2 planes. PREVERTEBRAL AND PARASPINAL SOFT TISSUES: No acute abnormality. VISUALIZED POSTERIOR FOSSA: Please refer to concurrent brain MRI. CERVICAL DISC SPACES: Multilevel mild degenerative disc disease. C2-C3: Disc osteophyte complex. No significant neuroforaminal  narrowing. Mild spinal canal stenosis. C3-C4: Disc osteophyte complex. Uncovertebral and facet arthropathy contribute to marked bilateral neuroforaminal narrowing, left greater than right. Moderate spinal canal stenosis. C4-C5: Disc osteophyte complex. Uncovertebral and facet arthropathy contribute to mild-moderate bilateral neuroforaminal narrowing. Mild spinal canal stenosis. C5-C6: Disc osteophyte complex. Uncovertebral and facet arthropathy contribute to moderate-marked bilateral neuroforaminal narrowing. Moderate spinal canal stenosis. C6-C7: Disc osteophyte complex. Uncovertebral and facet arthropathy contribute to marked left with moderate right neuroforaminal narrowing. Mild-moderate spinal canal stenosis. C7-T1: No significant neuroforaminal narrowing or spinal canal stenosis. THORACIC DEGENERATIVE CHANGE: Mild spondylotic changes without high-grade neuroforaminal narrowing or spinal canal stenosis. LUMBAR DISC SPACES: Multilevel mild degenerative disc disease. L1-L2: No significant neuroforaminal narrowing or spinal canal stenosis. L2-L3: No significant neuroforaminal narrowing or spinal canal stenosis. L3-L4: Disc bulge. No significant neuroforaminal narrowing. Mild spinal canal stenosis. L4-L5: Disc bulge, noting abutment of the bilateral traversing L5 nerve roots. Mild bilateral neuroforaminal narrowing. Mild spinal canal stenosis. L5-S1: Disc bulge with superimposed central disc herniation, noting abutment of the right greater than left traversing S1 nerve roots and mild-moderate central spinal canal stenosis. Mild bilateral neuroforaminal narrowing. POSTCONTRAST IMAGING: No abnormal enhancement. OTHER FINDINGS: Retropharyngeal course of the right carotid artery. Multiple large left thyroid nodules measuring greater than 1.5 cm. Scattered T2 hyperintensities in the liver, statistically cyst versus hemangioma. Small amount of debris is noted within the tracheobronchial tree. Patchy atelectasis with  small bilateral pleural effusions in the partially visualized lungs. Partially visualized enteric and endotracheal tubes.     Impression: 1.  No evidence of lymphomatous involvement of the spine. 2.  Multilevel spondylotic changes, as detailed above. See narrative above for full details. 3.  Multiple large left thyroid nodules, for which further evaluation with thyroid sonogram is recommended if not previously performed. The study was marked in EPIC for immediate notification. Workstation performed: [id number]     MRI lumbar spine w wo contrast  Result Date: 4/20/2025  Narrative: MRI CERVICAL, THORACIC AND LUMBAR SPINE WITH AND WITHOUT CONTRAST INDICATION: new b cell lymphoma. COMPARISON:  None. TECHNIQUE:  Multiplanar, multisequence imaging of the total spine was performed before and after gadolinium administration. . IV Contrast:  7 mL of Gadobutrol injection (SINGLE-DOSE) IMAGE QUALITY: Motion-degraded, which reduces diagnostic sensitivity. FINDINGS: ALIGNMENT: Normal alignment of the cervical spine. No acute fracture. Multilevel endplate centered marrow changes. Scattered areas of intrinsic T1/T2 hyperintensity in the vertebral bodies, most consistent with osseous venous malformations/hemangiomas. MARROW SIGNAL: As above. CORD: No cord signal abnormality that can be confirmed in 2 planes. PREVERTEBRAL AND PARASPINAL SOFT TISSUES: No acute abnormality. VISUALIZED POSTERIOR FOSSA: Please refer to concurrent brain MRI. CERVICAL DISC SPACES: Multilevel mild degenerative disc disease. C2-C3: Disc osteophyte complex. No significant neuroforaminal narrowing. Mild spinal canal stenosis. C3-C4: Disc osteophyte complex. Uncovertebral and facet arthropathy contribute to marked bilateral neuroforaminal narrowing, left greater than right. Moderate spinal canal stenosis. C4-C5: Disc osteophyte complex. Uncovertebral and facet arthropathy contribute to mild-moderate bilateral neuroforaminal narrowing. Mild spinal canal  stenosis. C5-C6: Disc osteophyte complex. Uncovertebral and facet arthropathy contribute to moderate-marked bilateral neuroforaminal narrowing. Moderate spinal canal stenosis. C6-C7: Disc osteophyte complex. Uncovertebral and facet arthropathy contribute to marked left with moderate right neuroforaminal narrowing. Mild-moderate spinal canal stenosis. C7-T1: No significant neuroforaminal narrowing or spinal canal stenosis. THORACIC DEGENERATIVE CHANGE: Mild spondylotic changes without high-grade neuroforaminal narrowing or spinal canal stenosis. LUMBAR DISC SPACES: Multilevel mild degenerative disc disease. L1-L2: No significant neuroforaminal narrowing or spinal canal stenosis. L2-L3: No significant neuroforaminal narrowing or spinal canal stenosis. L3-L4: Disc bulge. No significant neuroforaminal narrowing. Mild spinal canal stenosis. L4-L5: Disc bulge, noting abutment of the bilateral traversing L5 nerve roots. Mild bilateral neuroforaminal narrowing. Mild spinal canal stenosis. L5-S1: Disc bulge with superimposed central disc herniation, noting abutment of the right greater than left traversing S1 nerve roots and mild-moderate central spinal canal stenosis. Mild bilateral neuroforaminal narrowing. POSTCONTRAST IMAGING: No abnormal enhancement. OTHER FINDINGS: Retropharyngeal course of the right carotid artery. Multiple large left thyroid nodules measuring greater than 1.5 cm. Scattered T2 hyperintensities in the liver, statistically cyst versus hemangioma. Small amount of debris is noted within the tracheobronchial tree. Patchy atelectasis with small bilateral pleural effusions in the partially visualized lungs. Partially visualized enteric and endotracheal tubes.     Impression: 1.  No evidence of lymphomatous involvement of the spine. 2.  Multilevel spondylotic changes, as detailed above. See narrative above for full details. 3.  Multiple large left thyroid nodules, for which further evaluation with thyroid  sonogram is recommended if not previously performed. The study was marked in EPIC for immediate notification. Workstation performed: [id number]     MRI thoracic spine w wo contrast  Result Date: 4/20/2025  Narrative: MRI CERVICAL, THORACIC AND LUMBAR SPINE WITH AND WITHOUT CONTRAST INDICATION: new b cell lymphoma. COMPARISON:  None. TECHNIQUE:  Multiplanar, multisequence imaging of the total spine was performed before and after gadolinium administration. . IV Contrast:  7 mL of Gadobutrol injection (SINGLE-DOSE) IMAGE QUALITY: Motion-degraded, which reduces diagnostic sensitivity. FINDINGS: ALIGNMENT: Normal alignment of the cervical spine. No acute fracture. Multilevel endplate centered marrow changes. Scattered areas of intrinsic T1/T2 hyperintensity in the vertebral bodies, most consistent with osseous venous malformations/hemangiomas. MARROW SIGNAL: As above. CORD: No cord signal abnormality that can be confirmed in 2 planes. PREVERTEBRAL AND PARASPINAL SOFT TISSUES: No acute abnormality. VISUALIZED POSTERIOR FOSSA: Please refer to concurrent brain MRI. CERVICAL DISC SPACES: Multilevel mild degenerative disc disease. C2-C3: Disc osteophyte complex. No significant neuroforaminal narrowing. Mild spinal canal stenosis. C3-C4: Disc osteophyte complex. Uncovertebral and facet arthropathy contribute to marked bilateral neuroforaminal narrowing, left greater than right. Moderate spinal canal stenosis. C4-C5: Disc osteophyte complex. Uncovertebral and facet arthropathy contribute to mild-moderate bilateral neuroforaminal narrowing. Mild spinal canal stenosis. C5-C6: Disc osteophyte complex. Uncovertebral and facet arthropathy contribute to moderate-marked bilateral neuroforaminal narrowing. Moderate spinal canal stenosis. C6-C7: Disc osteophyte complex. Uncovertebral and facet arthropathy contribute to marked left with moderate right neuroforaminal narrowing. Mild-moderate spinal canal stenosis. C7-T1: No significant  neuroforaminal narrowing or spinal canal stenosis. THORACIC DEGENERATIVE CHANGE: Mild spondylotic changes without high-grade neuroforaminal narrowing or spinal canal stenosis. LUMBAR DISC SPACES: Multilevel mild degenerative disc disease. L1-L2: No significant neuroforaminal narrowing or spinal canal stenosis. L2-L3: No significant neuroforaminal narrowing or spinal canal stenosis. L3-L4: Disc bulge. No significant neuroforaminal narrowing. Mild spinal canal stenosis. L4-L5: Disc bulge, noting abutment of the bilateral traversing L5 nerve roots. Mild bilateral neuroforaminal narrowing. Mild spinal canal stenosis. L5-S1: Disc bulge with superimposed central disc herniation, noting abutment of the right greater than left traversing S1 nerve roots and mild-moderate central spinal canal stenosis. Mild bilateral neuroforaminal narrowing. POSTCONTRAST IMAGING: No abnormal enhancement. OTHER FINDINGS: Retropharyngeal course of the right carotid artery. Multiple large left thyroid nodules measuring greater than 1.5 cm. Scattered T2 hyperintensities in the liver, statistically cyst versus hemangioma. Small amount of debris is noted within the tracheobronchial tree. Patchy atelectasis with small bilateral pleural effusions in the partially visualized lungs. Partially visualized enteric and endotracheal tubes.     Impression: 1.  No evidence of lymphomatous involvement of the spine. 2.  Multilevel spondylotic changes, as detailed above. See narrative above for full details. 3.  Multiple large left thyroid nodules, for which further evaluation with thyroid sonogram is recommended if not previously performed. The study was marked in EPIC for immediate notification. Workstation performed: [id number]     MRI Brain BT w wo Contrast  Result Date: 4/20/2025  Narrative: MRI BRAIN WITH AND WITHOUT CONTRAST INDICATION: Please complete MRI brain w/wo BT protocol. COMPARISON: MRI brain 4/11/2025. TECHNIQUE: Multiplanar, multisequence  imaging of the brain and sella was performed before and after gadolinium administration. IV Contrast:  7 mL of Gadobutrol injection IMAGE QUALITY:   Motion-degraded, which reduces diagnostic sensitivity. FINDINGS: BRAIN PARENCHYMA: Redemonstrated mass centered in the left gangliocapsular region, noting slightly increased T2/FLAIR signal abnormality extending inferiorly compared to the prior study 4/11/2025. Decreased mass effect on the lateral ventricles compared to the prior study, noting there is a prior left frontal catheter tract with edema and hemosiderin deposition along the tract. No kerwin hydrocephalus; however, there are intraventricular blood products, new from the MRI 4/11/2025. Patchy nonspecific areas of T2/FLAIR signal abnormality in the left corona radiata/centrum semiovale, for example series 5, image 28) are also again noted. When compared to the prior MRI, there is decreased enhancement associated with the mass, noted to measure approximately 2.7 x 1.8 cm, previously 3.4 x 2.2 cm. Again seen are enhancing foci in the left posterior centrum semiovale measuring up to 4 mm, which could represent perivascular lesions. Redemonstrated leptomeningeal seeding and multiple areas of ependymal nodules and thickening. There is scattered leptomeningeal/ependymal enhancement about the frontal, temporal and posterior horns as well as the dependent aspect of the fourth ventricle.  There is also signal abnormality again noted along the left cerebral peduncle with faint associated enhancement. Transependymal flow of CSF is slightly decreased in the prior study There is hemosiderin deposition identified along the surface of the brainstem and upper cord. Perfusion: No definite elevated perfusion metrics. Diffusion: The above periventricular and intraventricular lesions demonstrate mild diffusion signal abnormality VENTRICLES: As above. SELLA AND PITUITARY GLAND: Posterior pituitary 6 mm T2 hyperintensity is identified  on series 9, image 170. ORBITS: No acute abnormality. PARANASAL SINUSES: Trace mucosal thickening. VASCULATURE:  Evaluation of the major intracranial vasculature demonstrates appropriate flow voids. CALVARIUM AND SKULL BASE: No acute abnormality. EXTRACRANIAL SOFT TISSUES: Fluid secretions are seen in the pharynx. Partially visualized oral route catheters.     Impression: Within the confines of a motion-degraded examination: 1.  Decreased enhancement and perfusion metrics associated with the left gangliocapsular mass (biopsy-proven lymphoma), noting decreased mass effect on the lateral ventricles and decreased transependymal flow of CSF. The change from MRI dated 4/11/2025 could be related to medical therapy. See narrative above for full discussion. 2.  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 3.  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 This could be further evaluated on nonemergent MRI pituitary protocol. The study was marked in EPIC for immediate notification. Workstation performed: [id number]     CT head wo contrast  Result Date: 4/20/2025  Narrative: CT BRAIN - WITHOUT CONTRAST INDICATION:   s/p EVD removal follow up hydrocephalus. COMPARISON: CT head 4/14/2025. TECHNIQUE:  CT examination of the brain was performed.  Multiplanar 2D reformatted images were created from the source data. Radiation dose length product (DLP) for this visit:  1308 mGy-cm .  This examination, like all CT scans performed in the [facility], was performed utilizing techniques to minimize radiation dose exposure, including the use of iterative reconstruction and automated exposure control. IMAGE QUALITY:  Diagnostic. FINDINGS: PARENCHYMA: Interval removal of left frontal approach ventriculostomy catheter, noting overall stable size and configuration of ventricular system, which again is noted to  contain intraventricular blood products. There is hypoattenuation as well as blood  products along the prior catheter tract. Decreased pneumocephalus compared to the prior study. Redemonstrated patchy hypoattenuation corresponding to T2/FLAIR signal abnormality involving the left gangliocapsular region extending into the left frontal lobe inferiorly, better assessed on concurrent brain MRI. VENTRICLES AND EXTRA-AXIAL SPACES: As above. VISUALIZED ORBITS: No acute abnormality. PARANASAL SINUSES: Trace mucosal thickening. CALVARIUM AND EXTRACRANIAL SOFT TISSUES: Partially visualized oral route catheters with adjacent secretions in the oropharynx.     Impression: 1.  Interval removal of left frontal approach ventriculostomy catheter, noting stable size and configuration of the ventricular system, which contains intraventricular blood products. Blood products also noted along the site of the prior catheter tract. 2.  Refer to concurrent brain MRI for characterization of hypoattenuation corresponding to T2/FLAIR signal abnormality involving the left gangliocapsular region extending into the left frontal lobe inferiorly. The study was marked in EPIC for immediate notification. Workstation performed: [id number]     MRI follow up neuro  Result Date: 4/18/2025  Narrative: MRI FOLLOW UP NEURO INDICATION: new b cell lymphoma. COMPARISON:  None. TECHNIQUE:  Multiplanar, multisequence imaging of the thoracic spine was scheduled to be performed. However, due to patient inability to tolerate the examination, it was prematurely terminated. Localizer series as well as a coronal T2 image were obtained. IMAGE QUALITY: Nondiagnostic. The obtained images are also motion degraded. FINDINGS: Hepatic, pulmonary and thyroid findings are better evaluated on dedicated CTA chest, abdomen and pelvis 3/29/2025.     Impression: Nondiagnostic examination. Recommend repeat examination, as clinically appropriate/tolerable. Workstation performed:  [id number]     CT head wo contrast  Result Date: 4/14/2025  Narrative: CT BRAIN - WITHOUT CONTRAST INDICATION:   change in mental status/EVD not draining. COMPARISON: CT head 4/13/2025. TECHNIQUE:  CT examination of the brain was performed.  Multiplanar 2D reformatted images were created from the source data. Radiation dose length product (DLP) for this visit:  1141 mGy-cm .  This examination, like all CT scans performed in the [facility], was performed utilizing techniques to minimize radiation dose exposure, including the use of iterative reconstruction and automated exposure control. IMAGE QUALITY:  Diagnostic. FINDINGS: PARENCHYMA: Stable hyperdense mass centered within the left basal ganglia with surrounding vasogenic edema. Left frontal lobe pneumocephalus and gas within the left frontal horn, increased since prior study. Interval resolution of the previously seen rightward bowing of the septum pellucidum. Stable mild rightward midline shift measuring 3 mm and partial effacement of the left aspect of the suprasellar cistern. There is no CT evidence for a large acute vascular distribution infarct. VENTRICLES AND EXTRA-AXIAL SPACES: Left frontal approach ventriculostomy catheter tip terminates near the foramen of Monro adjacent to the hyperdense mass. Small amount of layering intraventricular hemorrhage within the left occipital horn, slightly increased since prior study. There is improved hydrocephalus primarily of the left lateral ventricle since prior study. VISUALIZED ORBITS: Normal visualized orbits. PARANASAL SINUSES: Normal visualized paranasal sinuses. CALVARIUM AND EXTRACRANIAL SOFT TISSUES: Normal.     Impression: Left frontal approach ventriculostomy catheter terminating near the foramen of Monro with increased left frontal lobe pneumocephalus and gas within the left frontal horn since prior study. Slightly increased small amount of layering intraventricular hemorrhage within the left  occipital horn. Improved hydrocephalus primarily of the left lateral ventricle since prior study. Stable hyperdense mass centered within the left basal ganglia with surrounding vasogenic edema and mild rightward midline shift. The study was marked in EPIC for immediate notification. Workstation performed: [id number]     XR chest portable ICU  Result Date: 4/14/2025  Narrative: XR CHEST PORTABLE ICU INDICATION: intubation. COMPARISON: Chest radiograph 4/13/2019 FINDINGS: Endotracheal tube tip in the midthoracic trachea, approximately 4.5 cm above the arnav. External monitoring leads and clips project over the chest. Enteric catheter tip in the stomach. Clear lungs. No pneumothorax or pleural effusion. Normal cardiomediastinal silhouette. Bones are unremarkable for age. Normal upper abdomen.     Impression: No acute cardiopulmonary disease. Appropriately positioned endotracheal tube. Workstation performed: [id number]     CT head wo contrast  Result Date: 4/13/2025  Narrative: CT BRAIN - WITHOUT CONTRAST INDICATION:   EVD placement. COMPARISON: CT of the head from earlier the same day. TECHNIQUE:  CT examination of the brain was performed.  Multiplanar 2D reformatted images were created from the source data. Radiation dose length product (DLP) for this visit:  1195.54 mGy-cm .  This examination, like all CT scans performed in the [facility], was performed utilizing techniques to minimize radiation dose exposure, including the use of iterative reconstruction and automated exposure control. IMAGE QUALITY:  Diagnostic. FINDINGS: PARENCHYMA: The hyperdense mass centered within the left basal ganglia with surrounding vision edema is grossly stable. There is postprocedural pneumocephalus. There is also a small amount of gas within the left frontal horn. There is stable rightward bowing of the septum pellucidum. There is stable partial effacement of the left aspect of the suprasellar cistern. There is no  CT evidence for a large acute vascular distribution infarct. VENTRICLES AND EXTRA-AXIAL SPACES:  The patient is status post interval placement of a left frontal approach ventriculostomy catheter. The tip terminates adjacent to the previously seen stable hyperdense mass near the foramen of De Oliveira. There is a small amount of hemorrhage layering dependently within the left occipital horn. The overall degree of hydrocephalus is grossly stable since the prior exam. Correlate with prior contrast-enhanced MRI for multiple additional findings of some ependymal spread of disease. VISUALIZED ORBITS: Normal visualized orbits. PARANASAL SINUSES: Normal visualized paranasal sinuses. CALVARIUM AND EXTRACRANIAL SOFT TISSUES: Normal.     Impression: Status post placement of a left frontal approach ventriculostomy catheter with the tip terminating adjacent to the previously seen hyperdense mass near the foramen of De Oliveira. Small amount of intraventricular hemorrhage is noted within the left occipital horn with overall stable degree of hydrocephalus. Grossly stable hyperdense mass centered within the left basal ganglia. Correlate with prior contrast-enhanced MRI for additional findings. Workstation performed: [id number]     CT head wo contrast  Result Date: 4/13/2025  Narrative: CT BRAIN - WITHOUT CONTRAST INDICATION:   Altered mental status. Recently diagnosed brain tumor, altered mental status. COMPARISON: CT of the head 4/3/2025 and MRI of the brain 4/11/2025 TECHNIQUE:  CT examination of the brain was performed.  Multiplanar 2D reformatted images were created from the source data. Radiation dose length product (DLP) for this visit:  1155.94 mGy-cm .  This examination, like all CT scans performed in the [facility], was performed utilizing techniques to minimize radiation dose exposure, including the use of iterative reconstruction and automated exposure control. IMAGE QUALITY:  Diagnostic. FINDINGS: PARENCHYMA:  Redemonstrated left anterior basal ganglia mass measuring approximately 3.6 x 3.0 x 1.6 cm (series 2 image 23 and series 303 image 41) with surrounding vasogenic edema, this is grossly stable from MRI of the brain 4/11/2025 but appears more prominent when compared to CT of the head 4/3/2025 where it measured 2.5 x 2.6 x 1.1 cm. This results in regional mass effect and effacement of the left suprasellar cistern, similar to prior MRI. VENTRICLES AND EXTRA-AXIAL SPACES: Hyperdense mass located adjacent to the foramen of Monro measuring 1.4 x 1.3 cm, not significantly changed from CT of the head 4/3/2025 where it causes ipsilateral left lateral ventricle hydrocephalus, similar to 4/3/2025. Fourth ventricle is unremarkable in size. Again noted is small amount of intraventricular hemorrhage within the occipital horn of the left lateral ventricle (series 2 image 20), this appears grossly stable from 4/11/2025. Left hemispheric sulcal effacement secondary to mass  effect from left anterior basal ganglia mass mentioned above. VISUALIZED ORBITS: Normal visualized orbits. PARANASAL SINUSES: Polypoidal mucosal thickening of the left greater than right maxillary sinuses. CALVARIUM AND EXTRACRANIAL SOFT TISSUES: Normal.     Impression: Predominantly left sided obstructive hydrocephalus from lesion located adjacent to the foramen of Monro is not significantly changed from 4/3/2025. Blood products within the occipital horn of the left lateral ventricle is similar from MRI of the brain 4/11/2025. Left anterior basal ganglia mass with surrounding edema, regional mass effect, and effacement of the left suprasellar cistern, appears similar to MRI of the brain 4/11/2025. Please see that report for further characterization of total extent of tumor. Resident: CINTHYA FAJARDO I, the attending radiologist, have reviewed the images and agree with the final report above. Workstation performed: [id number]     MRI Brain BT w wo  Contrast  Addendum Date: 4/11/2025  Addendum: ADDENDUM: I also verbally communicated these findings to the covering hospitalist, Dr. Laurence Restrepo at 7:20 p.m.    Result Date: 4/11/2025  Narrative: MRI BRAIN WITH AND WITHOUT CONTRAST INDICATION: Follow-up of mass lesion, history of headaches. COMPARISON: MRI of the brain from March 26, 2025 TECHNIQUE: Multiplanar, multisequence imaging of the brain and sella was performed before and after gadolinium administration. Imaging performed on 1.5T MRI IV Contrast:  10 mL of Gadobutrol injection IMAGE QUALITY:   Diagnostic. FINDINGS: The previously noted left anterior basal ganglionic mass is larger and more coalescent, measuring approximately 3.5 x 3.1 x 1.6 cm in the transverse, AP and craniocaudad dimensions. There is greater surrounding vasogenic edema and mass effect. As previously noted, the lesion demonstrates leptomeningeal seeding and multiple areas of ependymal nodules and thickening. 1 of these nodules is located in the left foraminal [place] and results in ipsilateral obstructive hydrocephalus with mild transependymal flow of CSF. Intraventricular ependymal enhancement and nodularity is noted within the optic and infundibular recesses of the third ventricle. There is scattered leptomeningeal/ependymal enhancement about the bilateral frontal, temporal and posterior horns as well as the dependent aspect of the fourth ventricle. There is also signal abnormality within the ventral aspect of the left cerebral peduncle with faint enhancement which may represent an additional parenchymal versus leptomeningeal lesion. There are at least 2 left posterior centrum semiovale nodular lesion which measure 4 mm or less which may represent additional perivascular lesions. These are both slightly more conspicuous than noted on the prior study. Perfusion: ASL imaging demonstrates increased cerebral blood flow with the dominant lesion in the left anterior basal ganglia.  Diffusion: The above-mentioned periventricular and intraventricular lesions demonstrate mild restricted diffusion. OTHER FINDINGS: A punctate focus of FLAIR hyperintense signal is now seen within the left lateral frontal subcortical white matter (6:14). This focus does not demonstrate enhancement. VENTRICLES: See above comments regarding the obstruction at the level of the left foramina of De Oliveira and the moderate ipsilateral hydrocephalus. A small amount of hemorrhage is noted within the left occipital horn. SELLA AND PITUITARY GLAND:  Normal. ORBITS:  Normal. PARANASAL SINUSES:  Normal. VASCULATURE:  Evaluation of the major intracranial vasculature demonstrates appropriate flow voids. CALVARIUM AND SKULL BASE:  Normal. EXTRACRANIAL SOFT TISSUES:  Normal.     Impression: Interval enlargement of the dominant left anterior basal ganglionic mass lesion with greater surrounding vasogenic edema and mass effect. 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 Differential considerations include lymphoma, multicentric high-grade glioma, and less likely metastasis. Correlation with CSF cytology is recommended. Small amount of intraventricular hemorrhage. Interval progression of the previously noted neoplastic disease with the dominant mass in the left anterior basal ganglia and numerous growing ependymal and intraventricular nodular lesions. The study was marked in EPIC for immediate notification. Workstation performed: [id number]     FL lumbar puncture diagnostic  Result Date: 4/7/2025  Narrative: FLUOROSCOPICALLY GUIDED LUMBAR PUNCTURE INDICATION: Brain lesion FLUOROSCOPY TIME:  16 seconds IMAGES: 2 TECHNIQUE: Consent was obtained after fully explaining the procedure to the patient. Risks and benefits of procedure were described and understood. Precautions to avoid spinal headache were reviewed. 1% lidocaine was infiltrated at the  puncture site. Utilizing Left paravertebral approach, a 20 gauge spinal needle was advanced under fluoroscopic guidance into the subarachnoid space at the L L3-4 level, utilizing sterile technique. Once in position, approximately 17 cc of yellow-tinged CSF were removed  and placed into 4 tubes which subsequently were transported to the lab for requested analysis. The needle was removed. The patient tolerated the procedure well. The patient was discharged from the department with appropriate instructions.     Impression: Successful fluoroscopically guided lumbar puncture. Workstation performed: [id number]       I have personally reviewed labs, imaging studies, and pertinent reports.      This note has been generated by voice recognition software system.  Therefore, there may be spelling, grammar, and or syntax errors. Please contact if questions arise.

## 2025-05-08 NOTE — PROGRESS NOTES
OT Long-Term Goals       05/08/25 0930   Rehab Team Goals   ADL Team Goal Patient will require supervision with ADLs with least restrictive device upon completion of rehab program   Rehab Team Interventions   OT Interventions Self Care;Home Management;Therapeutic Exercise;Community Reintegration;Cognitive Reintegration;Cognitive Retraining;Energy Conservation;Patient/Family Education   Eating Goal   Eating Goal 06. Independent - Patient completes the activity by him/herself with no assistance from a helper.   Status Ongoing;Target goal - one week  (7-10 days)   Interventions Optimal Position   Grooming Goal   Oral Hygiene Goal 04. Supervision or touching assistance- Foreman provides VERBAL CUES or supervision throughout activity.   Task Wash/Dry Face;Wash/Dry Hands;Brush Teeth;Comb Hair;Shave   Status Ongoing;Target goal - one week  (7-10 days)   Intervention Assistive Device;Balance Work;Neuromuscular Education;Therapeutic Exercise;Tolerance Work   Tub/Shower Transfer Goal   Method Tub Shower  (SUP)   Assist Device   (LRAD)   Status Ongoing;Target goal - one week  (7-10 days)   Interventions ADL Training;Assistive Device;Neuromuscular Education   Bathing Goal   Shower/bathe self Goal 04. Supervision or touching assistance- [place] provides VERBAL CUES or supervision throughout activity.   Environment Seated;Standing   Status Ongoing;Target goal - one week  (7-10 days)   Intervention ADL Training;Assistive Device;Neuromuscular Education;Therapeutic Exercise   Upper Body Dressing Goal   Upper body dressing Goal 05. Setup or clean-up assistance - Foreman SETS UP or CLEANS UP, patient completes activity. Foreman assists only prior to or following the activity.   Task Upper Body;Arms in/out;Over Head   Environment Seated   Status Ongoing;Target goal - one week  (7-10 days)   Intervention Balance Work;Neuromuscular Education;Therapeutic Exercise;Tolerance Work;Assistive Device   Lower Body Dressing Goal   Lower body dressing  Goal 04. Supervision or touching assistance- [place] provides VERBAL CUES or supervision throughout activity.   Putting on/taking off footwear Goal 04. Supervision or touching assistance- [place] provides VERBAL CUES or supervision throughout activity.   Task Lower Body;Socks;Pants;Undergarment   Environment Seated;Standing   Status Ongoing;Target goal - one week  (7-10days)   Intervention Assistive Device;Balance Work;Neuromuscular Education;Therapeutic Exercise;Tolerance Work   Toileting Transfer Goal   Toilet transfer Goal 04. Supervision or touching assistance- [place] provides VERBAL CUES or supervision throughout activity.   Assistive Device   (LRAD)   Status Ongoing;Target goal - one week  (7-10 days)   Intervention ADL Training;Balance Work;Assistive Device   Toileting Goal   Toileting hygiene Goal 04. Supervision or touching assistance- [place] provides VERBAL CUES or supervision throughout activity.   Task Pants Up;Pants Down;Hygiene   Status Ongoing;Target goal - one week  (7-10days)   Intervention ADL Training;Balance Work;Assistive Device

## 2025-05-08 NOTE — ASSESSMENT & PLAN NOTE
> Patient has been pleasantly confused for months in the setting of brain mass.  > acutely worsened due to UTI and bacteremia. S/p 7 day course of antibx  > Continues to lack insight to his current situation and is severely cognitively impaired.  He does not get agitated for long periods of time and is mostly confused and impulsive.  > A&O 1 on [facility] admission    - Continue seroquel 12.5mg at noon with 50mg HS  - PRN Zyprexa 2.5mg IM as needed for agitation  -Overstimulation precautions, frequent re-orientation, re-direction, re-assurance  -Sleep log and agitation monitoring if needed  -Optimize sleep-wake cycle  -Limit sedating medications when possible  - Ensure optimal management electrolytes, nutrition, and hydration  - Ensure optimal bowel/bladder management  - Ensure optimal pain management   -Patient/family/caregiver education and training   -Continue 1:1 due to overall safety, impulsivity, lack of safety awareness and poor insight.  -Fall precautions with frequent rounding; proactive toileting program, patient should not be unattended in bathroom

## 2025-05-08 NOTE — QUICK NOTE
BS at dinnertime 195, last evening was 250.  Since Metformin was just started this AM, don't want to drop HS BS too low = will reduce Lispro to 3U TID from 8U.

## 2025-05-08 NOTE — PROGRESS NOTES
OT Long-Term Goals       05/08/25 0930   Rehab Team Goals   ADL Team Goal Patient will require supervision with ADLs with least restrictive device upon completion of rehab program   Rehab Team Interventions   OT Interventions Self Care;Home Management;Therapeutic Exercise;Community Reintegration;Cognitive Reintegration;Cognitive Retraining;Energy Conservation;Patient/Family Education   Eating Goal   Eating Goal 06. Independent - Patient completes the activity by him/herself with no assistance from a helper.   Status Ongoing;Target goal - two weeks   Interventions Optimal Position   Grooming Goal   Oral Hygiene Goal 04. Supervision or touching assistance- [place] provides VERBAL CUES or supervision throughout activity.   Task Wash/Dry Face;Wash/Dry Hands;Brush Teeth;Comb Hair;Shave   Status Ongoing;Target goal - two weeks   Intervention Assistive Device;Balance Work;Neuromuscular Education;Therapeutic Exercise;Tolerance Work   Tub/Shower Transfer Goal   Method Tub Shower  (SUP)   Assist Device   (LRAD)   Status Ongoing;Target goal - two weeks   Interventions ADL Training;Assistive Device;Neuromuscular Education   Bathing Goal   Shower/bathe self Goal 04. Supervision or touching assistance- [place] provides VERBAL CUES or supervision throughout activity.   Environment Seated;Standing   Status Ongoing;Target goal - two weeks   Intervention ADL Training;Assistive Device;Neuromuscular Education;Therapeutic Exercise   Upper Body Dressing Goal   Upper body dressing Goal 05. Setup or clean-up assistance - [place] SETS UP or CLEANS UP, patient completes activity. [place] assists only prior to or following the activity.   Task Upper Body;Arms in/out;Over Head   Environment Seated   Status Ongoing;Target goal - two weeks   Intervention Balance Work;Neuromuscular Education;Therapeutic Exercise;Tolerance Work;Assistive Device   Lower Body Dressing Goal   Lower body dressing Goal 04. Supervision or touching assistance- [place]  provides VERBAL CUES or supervision throughout activity.   Putting on/taking off footwear Goal 04. Supervision or touching assistance- [place] provides VERBAL CUES or supervision throughout activity.   Task Lower Body;Socks;Pants;Undergarment   Environment Seated;Standing   Status Ongoing;Target goal - two weeks   Intervention Assistive Device;Balance Work;Neuromuscular Education;Therapeutic Exercise;Tolerance Work   Toileting Transfer Goal   Toilet transfer Goal 04. Supervision or touching assistance- [place] provides VERBAL CUES or supervision throughout activity.   Assistive Device   (LRAD)   Status Ongoing;Target goal - two weeks   Intervention ADL Training;Balance Work;Assistive Device   Toileting Goal   Toileting hygiene Goal 04. Supervision or touching assistance- [place] provides VERBAL CUES or supervision throughout activity.   Task Pants Up;Pants Down;Hygiene   Status Ongoing;Target goal - two weeks   Intervention ADL Training;Balance Work;Assistive Device

## 2025-05-08 NOTE — ASSESSMENT & PLAN NOTE
CTA from 3/29/2025 with nonspecific 4 mm right lower lobe pulmonary nodule, recommended 3 months follow-up.

## 2025-05-08 NOTE — ASSESSMENT & PLAN NOTE
> Baseline Cr 0.9  > Recently 1.3 improved from before- peak 2.7  > Now s/p bicarb    - IVF per IM/Nephro  - Management at discretion of nephro

## 2025-05-08 NOTE — ASSESSMENT & PLAN NOTE
Hemoglobin A1C was 6.6 on 2/22/25  Sees End[facility] in [place]  Home:  Janumet XR  qd  Here: Lantus 10 U qhs/Lispro 8 U TID/Metformin 1000 mg qd  On DM diet  Insulin wasn't here this AM so didn't get Lispro with breakfast which explains why lunch BS was elevated.  Not sure why was 106 last matilde at HS since the night before was 258.    Metformin was ordered on transfer to be restarted this AM 5/8/25  No changes today 5/8 since there are multiple variables involved as noted above.  Will watch and if dinner BS is low, will reduce Lispro WM

## 2025-05-08 NOTE — PROGRESS NOTES
Progress Note - Nephrology   Name: Alexis Dennison 64 y.o. male I MRN: [id number]  Unit/Bed#: -01 I Date of Admission: 5/7/2025   Date of Service: 5/8/2025 I Hospital Day: 1     Assessment & Plan  LETY (acute kidney injury) (HCC)    Patient acute kidney injury his creatinine baseline 0.9 with hydration and is 1.2.  Again urinalysis was not suggestive of an intrinsic nephritis or interstitial process.  I cannot say that there was not mild nonoliguric ATN but again things are stable.  The patient completed his high dose infusion of methotrexate.  Had been on fluids with bicarbonate.  He has now completed his infusion.  His last methotrexate level was 0.13.  This value was lower than 0.5 which the comment is made you do not want to see at that level 48 hours postinfusion.  It had been decreasing as well.    Discontinue IV fluids  Heme-onc monitoring methotrexate levels  BMP a.m.      The patient has history of CNS lymphoma and received high-dose methotrexate infusion per hematology oncology who is managing this.  Also developed E. coli bacteremia.      Subjective     The patient is awake alert says he had an uneventful evening and was feeling okay.  He is eating he had no complaints for me.  The patient remains on 1: 1 observation.    No fever chills headache  No chest pain or shortness of breath  No cough or wheezing  No urinary complaints.    Objective :  Temp:  [97.8 °F (36.6 °C)-98.4 °F (36.9 °C)] 97.8 °F (36.6 °C)  HR:  [56-62] 58  BP: (118-135)/(68-70) 126/69  Resp:  [18] 18  SpO2:  [97 %-98 %] 98 %  O2 Device: None (Room air)    Current Weight: Weight - Scale: 76 kg (167 lb 8.8 oz)  First Weight: Weight - Scale: 75.9 kg (167 lb 6.4 oz)  I/O         05/06 0701  05/07 0700 05/07 0701  05/08 0700 05/08 0701  05/09 0700    P.O.   320    Total Intake(mL/kg)   320 (4.2)    Urine (mL/kg/hr)  1000     Total Output  1000     Net  -1000 +320                 Physical Exam  Constitutional:       General: He is not in  acute distress.     Appearance: He is not toxic-appearing.   HENT:      Head: Normocephalic.      Mouth/Throat:      Mouth: Mucous membranes are dry.   Eyes:      Extraocular Movements: Extraocular movements intact.   Cardiovascular:      Rate and Rhythm: Normal rate and regular rhythm.      Heart sounds:      No gallop.   Pulmonary:      Effort: Pulmonary effort is normal. No respiratory distress.      Breath sounds: No wheezing or rales.   Abdominal:      General: Bowel sounds are normal. There is no distension.      Palpations: Abdomen is soft.      Tenderness: There is no abdominal tenderness.   Neurological:      Mental Status: He is alert.         Medications:    Current Facility-Administered Medications:     acetaminophen (TYLENOL) tablet 650 mg, 650 mg, Oral, Q6H PRN, Crispin Arana MD    allopurinol (ZYLOPRIM) tablet 300 mg, 300 mg, Oral, Daily, Crispin Arana MD, 300 mg at 05/08/25 0801    alteplase (CATHFLO) injection 2 mg, 2 mg, Intracatheter, Q1MIN PRN, Crispin Arana MD    aluminum-magnesium hydroxide-simethicone (MAALOX) oral suspension 30 mL, 30 mL, Oral, Q4H PRN, Crispin Arana MD    atorvastatin (LIPITOR) tablet 20 mg, 20 mg, Oral, Daily, Crispin Arana MD, 20 mg at 05/08/25 0801    bisacodyl (DULCOLAX) rectal suppository 10 mg, 10 mg, Rectal, Daily, Crispin Arana MD    calcium carbonate (TUMS) chewable tablet 1,000 mg, 1,000 mg, Oral, Daily PRN, Crispin Arana MD    chlorhexidine (PERIDEX) 0.12 % oral rinse 15 mL, 15 mL, Mouth/Throat, Q12H CAIT, Crispin Arana MD, 15 mL at 05/08/25 0801    dexamethasone (DECADRON) tablet 4 mg, 4 mg, Oral, Q12H CAIT, 4 mg at 05/08/25 0801 **FOLLOWED BY** [START ON 5/11/2025] dexamethasone (DECADRON) tablet 2 mg, 2 mg, Oral, Q12H CAIT **FOLLOWED BY** [START ON 5/15/2025] dexamethasone (DECADRON) tablet 1 mg, 1 mg, Oral, Q12H CAIT, Crispin Arana MD    heparin (porcine) subcutaneous injection 5,000 Units, 5,000 Units, Subcutaneous, Q8H [facility], Crispin Arana MD,  5,000 Units at 05/08/25 0543    insulin glargine (LANTUS) subcutaneous injection 10 Units 0.1 mL, 10 Units, Subcutaneous, HS, Crispin Arana MD, 10 Units at 05/07/25 2156    insulin lispro (HumALOG/ADMELOG) 100 units/mL subcutaneous injection 2-12 Units, 2-12 Units, Subcutaneous, 4x Daily (AC & HS) **AND** Fingerstick Glucose (POCT), , , 4x Daily AC and at bedtime, Crispin Arana MD    insulin lispro (HumALOG/ADMELOG) 100 units/mL subcutaneous injection 8 Units, 8 Units, Subcutaneous, TID With Meals, Crispin Arana MD    melatonin tablet 6 mg, 6 mg, Oral, HS, Crispin Arana MD, 6 mg at 05/07/25 2216    metFORMIN (GLUCOPHAGE-XR) 24 hr tablet 1,000 mg, 1,000 mg, Oral, Daily, Crispin Arana MD, 1,000 mg at 05/08/25 0804    OLANZapine (ZyPREXA) IM injection 5 mg, 5 mg, Intramuscular, BID PRN, Crispin Arana MD    ondansetron (ZOFRAN) injection 4 mg, 4 mg, Intravenous, Q6H PRN, Crispin Arana MD    oxyCODONE (ROXICODONE) split tablet 2.5 mg, 2.5 mg, Oral, Q4H PRN **OR** oxyCODONE (ROXICODONE) IR tablet 5 mg, 5 mg, Oral, Q4H PRN, Crispin Arana MD    pantoprazole (PROTONIX) EC tablet 40 mg, 40 mg, Oral, Early Morning, Crispin Arana MD, 40 mg at 05/08/25 0543    QUEtiapine (SEROquel) tablet 12.5 mg, 12.5 mg, Oral, Daily, Crispin Arana MD    QUEtiapine (SEROquel) tablet 50 mg, 50 mg, Oral, HS, Crispin Arana MD    senna (SENOKOT) tablet 17.2 mg, 2 tablet, Oral, HS, Crispin Arana MD, 17.2 mg at 05/07/25 2157    sodium bicarbonate 100 mEq in dextrose 5 % 1,000 mL infusion, 100 mL/hr, Intravenous, Continuous, Crispin Arana MD, Last Rate: 100 mL/hr at 05/07/25 2155, 100 mL/hr at 05/07/25 2155    sodium chloride 0.9 % infusion, 20 mL/hr, Intravenous, Once PRN, Crispin Arana MD      Lab Results: I have reviewed the following results:  Results from last 7 days   Lab Units 05/08/25  0552 05/07/25  0443 05/06/25  0500 05/05/25  0439 05/04/25  0533 05/03/25  1555 05/03/25  0521 05/02/25  0544   WBC Thousand/uL  "2.63* 3.11*  --  2.45*  --  3.01*  --  3.51*  3.51*   HEMOGLOBIN g/dL 8.6* 8.7*  --  9.9*  --  9.4*  --  10.1*  10.1*   HEMATOCRIT % 24.2* 25.3*  --  28.8*  --  26.1*  --  28.2*  28.2*   PLATELETS Thousands/uL 195 213  --  191  --  146*  --  110*  110*   POTASSIUM mmol/L 4.1 4.3 4.4 4.1 3.8  --  3.7 4.0   CHLORIDE mmol/L 100 99 105 104 102  --  103 102   CO2 mmol/L 32 34* 32 33* 33*  --  30 31   BUN mg/dL 36* 40* 36* 28* 24  --  30* 32*   CREATININE mg/dL 1.23 1.36* 1.58* 1.36* 1.27  --  1.52* 1.37*   CALCIUM mg/dL 8.5 8.4 8.5 8.2* 7.8*  --  7.8* 8.4   MAGNESIUM mg/dL  --  2.0  --   --   --   --   --   --    PHOSPHORUS mg/dL  --  3.2  --  3.0 2.5  --  2.7  --    ALBUMIN g/dL 3.1*  --   --  3.0*  --   --   --   --        Administrative Statements     Portions of the record may have been created with voice recognition software. Occasional wrong word or \"sound a like\" substitutions may have occurred due to the inherent limitations of voice recognition software. Read the chart carefully and recognize, using context, where substitutions have occurred.If you have any questions, please contact the dictating provider.  "

## 2025-05-08 NOTE — UTILIZATION REVIEW
NOTIFICATION OF ADMISSION DISCHARGE   This is a Notification of Discharge from [facility]. Please be advised that this patient has been discharge from our facility. Below you will find the admission and discharge date and time including the patient’s disposition.   UTILIZATION REVIEW CONTACT:  Utilization Review Assistants  Network Utilization Review Department  Phone: [phone] x carefully listen to the prompts. All voicemails are confidential.  Email: [email or web address]     ADMISSION INFORMATION  PRESENTATION DATE: 3/29/2025  6:40 PM  OBERVATION ADMISSION DATE: N/A  INPATIENT ADMISSION DATE: 3/29/25 10:59 PM   DISCHARGE DATE: 5/7/2025  6:05 PM   DISPOSITION:[facility]    Network Utilization Review Department  ATTENTION: Please call with any questions or concerns to [phone] and carefully listen to the prompts so that you are directed to the right person. All voicemails are confidential.   For Discharge needs, contact Care Management DC Support Team at [phone] opt. 2  Send all requests for admission clinical reviews, approved or denied determinations and any other requests to dedicated fax number below belonging to the campus where the patient is receiving treatment. List of dedicated fax numbers for the Facilities:  FACILITY NAME UR FAX NUMBER   ADMISSION DENIALS (Administrative/Medical Necessity) [phone]   DISCHARGE SUPPORT TEAM ([facility]) [phone]   PARENT CHILD HEALTH (Maternity/NICU/Pediatrics) [phone]   [facility] [phone]   [facility] [phone]   [facility] [phone]   [facility] [phone]   [facility] [phone]   [facility] [phone]   [facility] [phone]   [facility] [phone]   [facility]  [facility] [phone]   [facility] [phone]   [facility] [phone]   [facility] [phone]

## 2025-05-08 NOTE — ASSESSMENT & PLAN NOTE
Monitor status post methotrexate  Previous baseline as OP 5/2023-2/22/25 was 1.1 to 1.0  Currently is 1.23  CMP 5/9/25

## 2025-05-08 NOTE — CONSULTS
PHYSICAL MEDICINE AND REHABILITATION CONSULT NOTE  Alexis Dennison 64 y.o. male MRN: [id number]  Unit/Bed#: -01 Encounter: [id number]     Rehab Diagnosis: Impairment of mobility, safety, Activities of Daily Living (ADLs), and cognitive/communication skills due to Brain Dysfunction:  02.1  Non-Traumatic    History of Present Illness:   Alexis Dennison is a 64 y.o. male with PMHx T2DM, GERD, HLD, HTN,  NAFLD and sleep apnea who initially presented to the [facility] on 3/29/25 for increased confusion in the setting of recently discovered brain mass. Family reported that patient was having increased forgetfulness and confusion over the past several months. Initial workup done was negative. He was seen at [facility] ED on 3/24 and had a CTH that was concerning for a brain lesion. 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 There was an initial plan to have an expedited outpatient NSGY evaluation, however he had worsening of his confusion and double vision, prompting this hospitalization. ED labs were unremarkable. CTH 3/29 was stable. CTA C/A/P 3/29 showed a RLL pulmonary nodule and a non-specific lesion in the hepatic lobe. For further investigation, MRI abdomen was done 3/30 and revealed a simple right hepatic lobe cyst. A diagnostic LP was done on 3/31 and findings were suspicious for CNS lymphoma. Repeat CTH on 4/3 showed worsening hydrocephalus. Repeat MRI 4/11 showed progression of disease from MRI scan on 3/30 with interval enlargement of the dominant left anterior basal ganglionic mass with more vasogenic edema and mass effect and persistent leptomeningeal and intraventricular seeding with obstructive hydrocephalus. On 4/13, patient had an change in  mental status. His CTH remained stable, but he ultimately required intubation for airway protection and was admitted to the ICU. CSF studies done on 4/13 and are pending final results. Biopsy with EVD placement was done on 4/14 by DALI. Patient self-extubated himself on 4/14. Preliminary biopsy studies showed small round blue cell tumor suggestive of NHL. Oncology was consulted and started patient on high dose steroids and methotrexate + Rituximab chemotherapy. Patient's mentation and function slowly improved with improvement in his hydrocephalus. Hospital course complicated by LETY, which is improving, intermittent agitated delirium, requiring antipsychotics and 1:1 virtual sitter, and E.coli UTI/bacteremia now s/p antibiotics. EVD was removed on 4/26. PT/OT evaluated the patient and patient has demonstrated the ability to participate in therapy for 3 hours a day. Patient was then deemed medically stable and appropriate for acute inpatient rehabilitation. Patient was transferred to [facility] on 5/7/2025  6:13 PM.    Plan:     Rehabilitation  Functional deficits: impaired mobility, self care  Begin PT/OT/SLP. Rehabilitation goals are to achieve a supervision level with mobility and self care.  Prognosis is good.  ELOS is 10-14 days.  Estimated discharge is home.     DVT prophylaxis  SQH    Pain  PRN Tylenol 650 mg Q6, oxycodone 2.5mg/5mg for mild/moderate/severe pain    Bladder plan  Continent.  [facility] bladder protocol ordered    Bowel plan  Continent. Last BM documented as 5/4  Colace 100 mg BID, Senna 17.2 mg daily with lunch and PRN Miralax/Dulcolax suppository    Code Status  Full code    Incidental Findings      * Primary central nervous system (CNS) lymphoma  Assessment & Plan  >Presented initially on 3/29 for acute worsening of confusion in the the setting of recently discovered brain mass in the outpatient setting  > S/p LP and findings suspicious for non-Hodgkin's lymphoma  > Hospital course complicated by  worsening hydrocephalus with leptomeningeal and intraventricular seeding.  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  > Started on high dose steroids and methotrexate every 2 weeks for 4 weeks (C1 received 4/18, C2 received 5/2) then maintenance every 4 weeks with Rituximab weekly for 8 weeks (1st dose Thursday 4/24 and second 5/3)  > Admitted to [facility] with ongoing cognitive deficits and delirium    - Continue Decadron 4 mg Q8  - Continue MTX + Rituximab as per Hem/onc  - Daily methotrexate levels  - Hem/Onc following  - NSGY following  - SLP for cognition    Impaired mobility and activities of daily living  Assessment & Plan  Patient was evaluated by the rehabilitation team MD and an appropriate candidate for acute inpatient rehabilitation program at this time.  The patient will tolerate 3 hours/day 5 to 7 days/week of intensive physical, occupational in order to obtain goals for community discharge  Due to the patient's functional Compared to their baseline level of function in addition to their ongoing medical needs, the patient would benefit from daily supervision from a rehabilitation physician as well as rehabilitation nursing to implement and adjust the medical as well as functional plan of care in order to meet the patient's goals.    Encephalopathy  Assessment & Plan  > Patient has been pleasantly confused for months in the setting of brain mass.  > acutely worsened due to UTI and bacteremia. S/p 7 day course of antibx  > Now improved  > A&O 1 on [facility] admission    - Continue seroquel 12.5mg at noon with 50mg HS  - PRN Zyprexa 2.5mg IM as needed for agitation  -Overstimulation precautions, frequent re-orientation, re-direction, re-assurance  -Sleep log and agitation monitoring if needed  -Optimize sleep-wake cycle  -Limit sedating medications when possible  - Ensure optimal management electrolytes, nutrition, and  hydration  - Ensure optimal bowel/bladder management  - Ensure optimal pain management   -Patient/family/caregiver education and training   -Continue 1:1   -Fall precautions with frequent rounding; proactive toileting program, patient should not be unattended in bathroom        E. coli UTI  Assessment & Plan  > Pet met sepsis criteria on 4/29 with confusion and agitation. UA was positive. Urine culture and blood cultures showed Ecoli sensitive to cephalosporins.  > ID consulted and pt completed 7d course of ceftriaxone  > Approved to restart chemo 5/2.    - Monitor for any recurrence of agitation or new fevers/signs of infection    Liver lesion  Assessment & Plan  > CTA 3/29- nonspecific 12mm hypodense right hepatic lesion, recommended MRI abdomen  > MRI abdomen 3/30- simple right hepatic lobe cyst    - Follow up outpatient for monitoring    Pulmonary nodule  Assessment & Plan  > CTA 3/29: nonspecific 4 mm RLL pulmonary nodule   - follow up outpatient for repeat imaging in 3 months    Mixed hyperlipidemia  Assessment & Plan  - Continue atorvastatin 10 mg daily    HTN, goal below 130/80  Assessment & Plan  > Blood pressure controlled off antihypertensives    - Monitor VS  - Management per IM    Type 2 diabetes mellitus with hyperglycemia, without long-term current use of insulin (HCC)  Assessment & Plan  Lab Results   Component Value Date    HGBA1C 6.6 (H) 02/22/2025       Recent Labs     05/07/25  0714 05/07/25  1053 05/07/25  1549 05/07/25  2054   POCGLU 240* 313* 250* 106       Blood Sugar Average: Last 72 hrs:  (P) 106    - Metformin 1000mg daily  - Lantus 10u daily with Lispro 8u TID and SSI  - Monitor accuchecks while on steroids  - Management per IM    LETY (acute kidney injury) (HCC)  Assessment & Plan  > Baseline Cr 0.9  > Recently 1.3 improved from before- peak 2.7  > Now s/p bicarb    - IVF per IM/Nephro  - Management at discretion of nephro     Ambulatory dysfunction  Assessment & Plan  - Fall  "precautions  - PT/OT          Subjective/Interval Events:   On presentation, patient endorses no acute complaints. He is only oriented to self. He denies any acute complaints.     Review of Systems   Constitutional:  Negative for chills and fever.   HENT:  Negative for trouble swallowing.    Respiratory:  Negative for shortness of breath.    Cardiovascular:  Negative for chest pain.   Gastrointestinal:  Negative for abdominal pain, constipation, diarrhea, nausea and vomiting.   Genitourinary:  Negative for difficulty urinating.   Skin:  Negative for rash.   Neurological:  Positive for headaches. Negative for dizziness, light-headedness and numbness.        Occasional headaches   Psychiatric/Behavioral:  Positive for confusion. Negative for agitation.          Function:  Prior level of function and living situation:  Patient resides with his family in a multi-level home with 3 steps to enter and a FFOS inside. His primary bedroom/bathroom are on the 2nd floor.    PLOF: Patient was previously independent with mobility/transfers, ambulation, ADLs and IADLs.    Current level of function:  Physical Therapy: modA bed mobility, modA transfers, modA ambulation (150') with RW  Occupational Therapy: sup eating, modA oral care/UBD/toileting, maxA bathing/LBD/footwear  Speech Therapy: N/A      Physical Exam:  /69 (BP Location: Right arm)   Pulse 58   Temp 97.8 °F (36.6 °C) (Oral)   Resp 18   Ht 5' 10\" (1.778 m)   Wt 76 kg (167 lb 8.8 oz)   SpO2 98%   BMI 24.04 kg/m²        Intake/Output Summary (Last 24 hours) at 5/8/2025 1122  Last data filed at 5/8/2025 0848  Gross per 24 hour   Intake 320 ml   Output 1000 ml   Net -680 ml       Body mass index is 24.04 kg/m².      Physical Exam  Vitals reviewed.   Constitutional:       Appearance: Normal appearance.   HENT:      Head: Normocephalic.      Comments: Biopsy site well healed     Right Ear: External ear normal.      Left Ear: External ear normal.      Nose: Nose " normal.      Mouth/Throat:      Mouth: Mucous membranes are moist.      Pharynx: Oropharynx is clear.   Eyes:      Conjunctiva/sclera: Conjunctivae normal.   Cardiovascular:      Rate and Rhythm: Normal rate and regular rhythm.      Heart sounds: Normal heart sounds.   Pulmonary:      Effort: Pulmonary effort is normal. No respiratory distress.      Breath sounds: Normal breath sounds. No wheezing.   Abdominal:      General: Bowel sounds are normal. There is no distension.      Palpations: Abdomen is soft.   Musculoskeletal:         General: Normal range of motion.      Right lower leg: No edema.      Left lower leg: No edema.   Skin:     General: Skin is warm and dry.   Neurological:      Mental Status: He is alert.      Comments: Oriented x1 (self only). Pleasantly confused. Motor planning issues noted. Finger to nose intact. Sensation intact to course touch throughout all extremities.           MMT:   Strength:   Right  Left  Site  Right  Left  Site    5 5  S Ab: Shoulder Abductors  4  4  HF: Hip Flexors    5 5  EF: Elbow Flexors  5  5 KF: Knee Flexors    5  5  EE: Elbow Extensors  5  5  KE: Knee Extensors    5  5  WE: Wrist Extensors  5  5  DR: Dorsi Flexors    4  4  FF: Finger Flexors  5  5  PF: Plantar Flexors    4  4  HI: Hand Intrinsics  NT  NT  EHL: Extensor Hallucis Longus       Labs, medications, and imaging personally reviewed.    Laboratory:    Lab Results   Component Value Date    SODIUM 137 05/08/2025    K 4.1 05/08/2025     05/08/2025    CO2 32 05/08/2025    BUN 36 (H) 05/08/2025    CREATININE 1.23 05/08/2025    GLUC 206 (H) 05/08/2025    CALCIUM 8.5 05/08/2025     Lab Results   Component Value Date    WBC 2.63 (L) 05/08/2025    HGB 8.6 (L) 05/08/2025    HCT 24.2 (L) 05/08/2025    MCV 87 05/08/2025     05/08/2025     Lab Results   Component Value Date    INR 1.07 04/15/2025    INR 0.98 04/13/2025    INR 0.95 04/07/2025    PROTIME 14.2 04/15/2025    PROTIME 13.3 04/13/2025    PROTIME 12.9  04/07/2025         Current Facility-Administered Medications:     acetaminophen (TYLENOL) tablet 650 mg, 650 mg, Oral, Q6H PRN, Crispin Arana MD    allopurinol (ZYLOPRIM) tablet 300 mg, 300 mg, Oral, Daily, Crispin Arana MD, 300 mg at 05/08/25 0801    alteplase (CATHFLO) injection 2 mg, 2 mg, Intracatheter, Q1MIN PRN, Crispin Arana MD    aluminum-magnesium hydroxide-simethicone (MAALOX) oral suspension 30 mL, 30 mL, Oral, Q4H PRN, Crispin Arana MD    atorvastatin (LIPITOR) tablet 20 mg, 20 mg, Oral, Daily, Crispin Arana MD, 20 mg at 05/08/25 0801    bisacodyl (DULCOLAX) rectal suppository 10 mg, 10 mg, Rectal, Daily, Crispin Arana MD    calcium carbonate (TUMS) chewable tablet 1,000 mg, 1,000 mg, Oral, Daily PRN, Crispin Arana MD    chlorhexidine (PERIDEX) 0.12 % oral rinse 15 mL, 15 mL, Mouth/Throat, Q12H CAIT, Crispin Arana MD, 15 mL at 05/08/25 0801    dexamethasone (DECADRON) tablet 4 mg, 4 mg, Oral, Q12H CAIT, 4 mg at 05/08/25 0801 **FOLLOWED BY** [START ON 5/11/2025] dexamethasone (DECADRON) tablet 2 mg, 2 mg, Oral, Q12H CAIT **FOLLOWED BY** [START ON 5/15/2025] dexamethasone (DECADRON) tablet 1 mg, 1 mg, Oral, Q12H CAIT, Crispin Arana MD    heparin (porcine) subcutaneous injection 5,000 Units, 5,000 Units, Subcutaneous, Q8H CAIT, Crispin Arana MD, 5,000 Units at 05/08/25 0543    insulin glargine (LANTUS) subcutaneous injection 10 Units 0.1 mL, 10 Units, Subcutaneous, HS, Crispin Arana MD, 10 Units at 05/07/25 2156    insulin lispro (HumALOG/ADMELOG) 100 units/mL subcutaneous injection 2-12 Units, 2-12 Units, Subcutaneous, 4x Daily (AC & HS) **AND** Fingerstick Glucose (POCT), , , 4x Daily AC and at bedtime, Crispin Arana MD    insulin lispro (HumALOG/ADMELOG) 100 units/mL subcutaneous injection 8 Units, 8 Units, Subcutaneous, TID With Meals, Crispin Arana MD    melatonin tablet 6 mg, 6 mg, Oral, HS, Crispin Arana MD, 6 mg at 05/07/25 6409    metFORMIN (GLUCOPHAGE-XR) 24 hr tablet 1,000  mg, 1,000 mg, Oral, Daily, Crispin Arana MD, 1,000 mg at 05/08/25 0804    OLANZapine (ZyPREXA) IM injection 5 mg, 5 mg, Intramuscular, BID PRN, Crispin Arana MD    ondansetron (ZOFRAN) injection 4 mg, 4 mg, Intravenous, Q6H PRN, Crispin Arana MD    oxyCODONE (ROXICODONE) split tablet 2.5 mg, 2.5 mg, Oral, Q4H PRN **OR** oxyCODONE (ROXICODONE) IR tablet 5 mg, 5 mg, Oral, Q4H PRN, Crispin Arana MD    pantoprazole (PROTONIX) EC tablet 40 mg, 40 mg, Oral, Early Morning, Crispin Arana MD, 40 mg at 05/08/25 0543    QUEtiapine (SEROquel) tablet 12.5 mg, 12.5 mg, Oral, Daily, Crisipn Arana MD    QUEtiapine (SEROquel) tablet 50 mg, 50 mg, Oral, HS, Crispin Arana MD    senna (SENOKOT) tablet 17.2 mg, 2 tablet, Oral, HS, Crispin Arana MD, 17.2 mg at 05/07/25 2157    sodium chloride 0.9 % infusion, 20 mL/hr, Intravenous, Once PRN, Crispin Arana MD  No Known Allergies   Patient Active Problem List    Diagnosis Date Noted    Primary central nervous system (CNS) lymphoma 03/29/2025    Impaired mobility and activities of daily living 05/08/2025    Encephalopathy 04/02/2025    E. coli UTI 05/01/2025    Thyroid nodule 03/30/2025    Pulmonary nodule 03/30/2025    Liver lesion 03/30/2025    Type 2 diabetes mellitus with hyperglycemia, without long-term current use of insulin (HCC) 06/14/2022    HTN, goal below 130/80 06/14/2022    Mixed hyperlipidemia 06/14/2022    HTN (hypertension) 05/08/2025    Transaminitis 05/08/2025    Leukopenia 05/06/2025    At risk for acid-base imbalance 05/06/2025    Electrolyte imbalance risk 05/06/2025    Sepsis (HCC) 04/29/2025    E coli bacteremia 04/29/2025    Metabolic alkalosis 04/24/2025    LETY (acute kidney injury) (HCC) 04/21/2025    Goals of care, counseling/discussion 04/16/2025    Palliative care encounter 04/16/2025    Primary CNS lymphoma 04/16/2025    Ambulatory dysfunction 04/01/2025    Vitamin D deficiency 06/14/2022    NAFLD (nonalcoholic fatty liver disease) 06/14/2022      Past Medical History:   Diagnosis Date    Colon polyp     Diabetes mellitus (HCC)     GERD (gastroesophageal reflux disease)     Hyperlipidemia     Hypertension     Liver disease     Sleep apnea      Past Surgical History:   Procedure Laterality Date    COLONOSCOPY      CYST REMOVAL      back    FL LUMBAR PUNCTURE DIAGNOSTIC  3/31/2025    FL LUMBAR PUNCTURE DIAGNOSTIC  4/7/2025    [place] EXC B9 LESION MRGN XCP SK TG T/A/L 0.6-1.0 CM N/A 6/7/2023    Procedure: EXCISION  BIOPSY LESION/MASS ABDOMINAL/CHEST WALL;  Surgeon: Trevor Villavicenico MD;  Location: [facility];  Service: General    THIRD VENTRICULOSTOMY Left 4/14/2025    Procedure: Left frontal endoscopic biopsy of ventricular mass and placement of EVD;  Surgeon: Paul Causey MD;  Location: [facility];  Service: Neurosurgery     Social History     Socioeconomic History    Marital status: /Civil Union     Spouse name: Not on file    Number of children: Not on file    Years of education: Not on file    Highest education level: Not on file   Occupational History    Not on file   Tobacco Use    Smoking status: Never    Smokeless tobacco: Never   Vaping Use    Vaping status: Never Used   Substance and Sexual Activity    Alcohol use: Yes     Alcohol/week: 1.0 standard drink of alcohol     Types: 1 Cans of beer per week     Comment: socially    Drug use: Never    Sexual activity: Not Currently     Partners: Female   Other Topics Concern    Not on file   Social History Narrative    Not on file     Social Drivers of Health     Financial Resource Strain: Not on file   Food Insecurity: Patient Unable To Answer (5/8/2025)    Nursing - Inadequate Food Risk Classification     Worried About Running Out of Food in the Last Year: Not on file     Ran Out of Food in the Last Year: Not on file     Ran Out of Food in the Last Year: Patient unable to answer   Transportation Needs: Patient Unable To Answer (5/8/2025)    Nursing - Transportation Risk Classification     Lack of  Transportation: Not on file     Lack of Transportation: Patient unable to answer   Physical Activity: Not on file   Stress: Not on file   Social Connections: Not on file   Intimate Partner Violence: Patient Unable To Answer (2025)    Nursing IPS     Feels Physically and Emotionally Safe: Not on file     Physically Hurt by Someone: Not on file     Humiliated or Emotionally Abused by Someone: Not on file     Physically Hurt by Someone: Patient unable to answer     Hurt or Threatened by Someone: Patient unable to answer   Housing Stability: Patient Unable To Answer (2025)    Nursing: Inadequate Housing Risk Classification     Has Housing: Not on file     Worried About Losing Housing: Not on file     Unable to Get Utilities: Not on file     Unable to Pay for Housing in the Last Year: Patient unable to answer     Has Housin     Social History     Tobacco Use   Smoking Status Never   Smokeless Tobacco Never     Social History     Substance and Sexual Activity   Alcohol Use Yes    Alcohol/week: 1.0 standard drink of alcohol    Types: 1 Cans of beer per week    Comment: socially     Family History   Problem Relation Age of Onset    Cancer Mother     Cancer Father     Diabetes Paternal Grandfather     Diabetes unspecified Paternal Grandfather         Type 2    Colon polyps Neg Hx     Colon cancer Neg Hx          Medical Necessity Criteria for [facility] Admission: Acute Kidney Injury, Bowel/Bladder Management, Diabetes requiring close blood glucose monitoring, Incision/Wound care, Urinary Tract Infection (UTI), Delirium/Agitation/Encephalopathy, and Positive wound culture. In addition, the preadmission screen, post-admission physical evaluation, overall plan of care and admissions order demonstrate a reasonable expectation that the following criteria were met at the time of admission to the [facility].  The patient requires active and ongoing therapeutic intervention of multiple therapy disciplines (physical therapy,  occupational therapy, speech-language pathology, or prosthetics/orthotics), one of which is physical or occupational therapy.    Patient requires an intensive rehabilitation therapy program, as defined in Chapter 1, section 110.2.2 of the CMS Medicare Policy Manual. This intensive rehabilitation therapy program will consist of at least 3 hours of therapy per day at least 5 days per week or at least 15 hours of intensive rehabilitation therapy within a 7 consecutive day period, beginning with the date of admission to the [facility].    The patient is reasonably expected to actively participate in, and benefit significantly from, the intensive rehabilitation therapy program as defined in Chapter 1, section 110.2.2 of the CMS Medicare Policy Manual at this time of admission to the [facility]. He can reasonably be expected to make measurable improvement (that will be of practical value to improve the patient’s functional capacity or adaptation to impairments) as a result of the rehabilitation treatment, as defined in section 110.3, and such improvement can be expected to be made within the prescribed period of time. As noted in the CMS Medicare Policy Manual, the patient need not be expected to achieve complete independence in the domain of self-care nor be expected to return to his or her prior level of functioning in order to meet this standard.  The patient must require physician supervision by a rehabilitation physician. As such, a rehabilitation physician will conduct face-to-face visits with the patient at least 3 days per week throughout the patient’s stay in the [facility] to assess the patient both medically and functionally, as well as to modify the course of treatment as needed to maximize the patient’s capacity to benefit from the rehabilitation process.  The patient requires an intensive and coordinated interdisciplinary approach to providing rehabilitation, as defined in Chapter 1, section 110.2.5 of the CMS Medicare Policy  Manual. This will be achieved through periodic team conferences, conducted at least once in a 7-day period, and comprising of an interdisciplinary team of medical professionals consisting of: a rehabilitation physician, registered nurse,  and/or , and a licensed/certified therapist from each therapy discipline involved in treating the patient.     Changes Since Pre-admission Assessment: None -This patient's participation in rehab continues to be reasonable, necessary and appropriate.    CMS Required Post-Admission Physician Evaluation Elements  History and Physical, including medical history, functional history and active comorbidities as in above text.     Post-Admission Physician Evaluation:  The patient has the potential to make improvement and is in need of physical, occupational, and/or therapy services. The patient may also need nutritional services. Given the patient's complex medical condition and risk of further medical complications, rehabilitative services cannot be safely provided at a lower level of care, such as a skilled nursing facility. I have reviewed the patient's functional and medical status at the time of the preadmission screening and they are the same as on the day of this admission. I acknowledge that I have personally performed a full physical examination on this patient within 24 hours of admission. The patient and/or family demonstrated understanding the rehabilitation program and the discharge process after we discussed them.     Agree in entirety: yes  Minor adaptions: none    Major changes: none    Jessica Arreola DO  PGY-2 Resident Physician  Physical Medicine and Rehabilitation  [facility]

## 2025-05-08 NOTE — ASSESSMENT & PLAN NOTE
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

## 2025-05-08 NOTE — ASSESSMENT & PLAN NOTE
Lab Results   Component Value Date    HGBA1C 6.6 (H) 02/22/2025       Recent Labs     05/08/25  1548 05/08/25  2044 05/09/25  0606 05/09/25  1031   POCGLU 195* 133 134 202*       Blood Sugar Average: Last 72 hrs:  (P) [id number]    - Metformin 1000mg daily  - Lantus 10u daily with Lispro 8u TID and SSI  - Monitor accuchecks while on steroids  - Management per IM

## 2025-05-08 NOTE — PROGRESS NOTES
"Progress Note - Hospitalist   Name: Alexis Dennison 64 y.o. male I MRN: [id number]  Unit/Bed#: -01 I Date of Admission: 5/7/2025   Date of Service: 5/8/2025 I Hospital Day: 1    Assessment & Plan  Primary central nervous system (CNS) lymphoma  Patient with development of worsening confusion, and was seen in the ED on 3/24/2025.  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  Lymphoma/leukemia panel was nondiagnostic  CTH 4/3/25: concerning for worsening hydrocephalus.   Repeat LP 4/7/25 = undiagnostic again   MRI brain 4/11/25: \"Interval enlargement of the dominant left anterior basal ganglionic mass lesion with greater surrounding vasogenic edema and mass effect. 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  S/p EVD, self removed 4/26  S/p Keppra 500mg BID x 7 days for postoperative seizure ppx   Started on chemotherapy during hospital stay and is for weekly Rituximab and Methotrexate every 2 weeks  Daily serum methotrexate levels until level is less than 0.1  Nephrology & heme-onc monitoring urine pH for urine alkalinization with intermittent bicarb drip   Continue Dexamethasone taper = LD will be 5/19/25  Maintain PICC line  Will reconsult H-O  Type 2 diabetes mellitus with hyperglycemia, without long-term current use of insulin (HCC)  Hemoglobin A1C was 6.6 on 2/22/25  Sees Endo [facility] in [place]  Home:  Janumet XR  qd  Here: Lantus 10 U qhs/Lispro 8 U " TID/Metformin 1000 mg qd  On DM diet  Insulin wasn't here this AM so didn't get Lispro with breakfast which explains why lunch BS was elevated.  Not sure why was 106 last matilde at HS since the night before was 258.    Metformin was ordered on transfer to be restarted this AM 5/8/25  No changes today 5/8 since there are multiple variables involved as noted above.  Will watch and if dinner BS is low, will reduce Lispro WM  Mixed hyperlipidemia  Continue atorvastatin  Thyroid nodule  TSH/free T4 4/21/25 = 0.019/1.13  Nonemergent outpatient endocrine follow-up.   Will require outpatient TSH, free T4, +/- further imaging with endocrine such as radioactive uptake   Pulmonary nodule  CT chest 3 months follow-up  Liver lesion  Patient will require outpatient follow-up  Encephalopathy  Improving  Continue Seroquel  Continue steroid taper  On 1:1  LETY (acute kidney injury) (HCC)  Monitor status post methotrexate  Previous baseline as OP 5/2023-2/22/25 was 1.1 to 1.0  Currently is 1.23  CMP 5/9/25  Sepsis (HCC)  Resolved  Continue to monitor off antibiotics  E coli bacteremia  Patient completed 7 days of IV cefazolin which was finished on May 5  E. coli UTI  Completed antibiotics as above  HTN (hypertension)  Home:  Losartan -25 qd  Here:  no meds for now since BPs are ok  Transaminitis  AST is 111, previously 114,  ALT is 322 previously 149  D/w H-O, they are aware.    For CMP 5/9/25  Leukopenia  WBC is down to 2.6 today 5/8/25  H-O following    The above assessment and plan was reviewed and updated as determined by my evaluation of the patient on 5/8/2025.    History of Present Illness   Patient seen and examined. Patients overnight issues or events were reviewed with nursing staff. New or overnight issues include the following:   No new or overnight issues.  Offers no complaints    Review of Systems   All other systems reviewed and are negative.      Objective :  Temp:  [97.8 °F (36.6 °C)-98.4 °F (36.9 °C)] 97.8 °F  (36.6 °C)  HR:  [56-62] 58  BP: (118-135)/(68-70) 126/69  Resp:  [18] 18  SpO2:  [97 %-98 %] 98 %  O2 Device: None (Room air)    Invasive Devices       Peripherally Inserted Central Catheter Line  Duration             PICC Line 05/01/25 Left Brachial 6 days              Drain  Duration             External Urinary Catheter 5 days                    Physical Exam  General Appearance: no distress, nontoxic appearing  HEENT:  External ear normal.  Nose normal w/o drainage. Mucous membranes are moist. Oropharynx is clear. Conjunctiva clear w/o icterus or redness.  Neck:  Supple, normal ROM  Lungs: BBS without crackles/wheeze/rhonchi; respirations unlabored with normal inspiratory/expiratory effort.  No retractions noted.  On RA  CV: regular rate and rhythm; no rubs/murmurs/gallops, PMI normal   ABD: Abdomen is soft.  Bowel sounds all quadrants.  Nontender with no distention.    EXT: no edema  Skin: normal turgor, normal texture  Psych: affect normal, mood normal  Neuro: AA          The above physical exam was reviewed and updated as determined by my evaluation of the patient on 5/8/2025.      Lab Results: I have reviewed the following results:  Results from last 7 days   Lab Units 05/08/25  0552 05/07/25  0443   WBC Thousand/uL 2.63* 3.11*   HEMOGLOBIN g/dL 8.6* 8.7*   HEMATOCRIT % 24.2* 25.3*   PLATELETS Thousands/uL 195 213     Results from last 7 days   Lab Units 05/08/25  0552 05/07/25  0443   SODIUM mmol/L 137 138   POTASSIUM mmol/L 4.1 4.3   CHLORIDE mmol/L 100 99   CO2 mmol/L 32 34*   BUN mg/dL 36* 40*   CREATININE mg/dL 1.23 1.36*   CALCIUM mg/dL 8.5 8.4             Results from last 7 days   Lab Units 05/07/25  2054 05/07/25  1549 05/07/25  1053   POC GLUCOSE mg/dl 106 250* 313*       Imaging Results Review: No pertinent imaging studies reviewed.  Other Study Results Review: No additional pertinent studies reviewed.    Review of Scheduled Meds: Medications  reviewed and reconciled as needed  Current  Facility-Administered Medications   Medication Dose Route Frequency Provider Last Rate    acetaminophen  650 mg Oral Q6H PRN Crispin Arana MD      allopurinol  300 mg Oral Daily Crispin Arana MD      alteplase  2 mg Intracatheter Q1MIN PRN Crispin Arana MD      aluminum-magnesium hydroxide-simethicone  30 mL Oral Q4H PRN Crispin Arana MD      atorvastatin  20 mg Oral Daily Crispin Arana MD      bisacodyl  10 mg Rectal Daily Crispin Arana MD      calcium carbonate  1,000 mg Oral Daily PRN Crispin Arana MD      chlorhexidine  15 mL Mouth/Throat Q12H [facility] Crispin Arana MD      dexamethasone  4 mg Oral Q12H [facility] Arana MD      Followed by    [START ON 5/11/2025] dexamethasone  2 mg Oral Q12H [facility] Crispin Arana MD      Followed by    [START ON 5/15/2025] dexamethasone  1 mg Oral Q12H [facility] Crispin Arana MD      heparin (porcine)  5,000 Units Subcutaneous Q8H [facility] Crispin Arana MD      insulin glargine  10 Units Subcutaneous HS Crispin Arana MD      insulin lispro  2-12 Units Subcutaneous 4x Daily (AC & HS) Crispin Arana MD      insulin lispro  8 Units Subcutaneous TID With Meals Crispin Arana MD      melatonin  6 mg Oral HS Crispin Arana MD      metFORMIN  1,000 mg Oral Daily Crispin Arana MD      OLANZapine  5 mg Intramuscular BID PRN Crispin Arana MD      ondansetron  4 mg Intravenous Q6H PRN Crispin Arana MD      oxyCODONE  2.5 mg Oral Q4H PRN Crispin Arana MD      Or    oxyCODONE  5 mg Oral Q4H PRN Crispin Arana MD      pantoprazole  40 mg Oral Early Morning Crispin Arana MD      QUEtiapine  12.5 mg Oral Daily Crispin Arana MD      QUEtiapine  50 mg Oral HS Crispin Arana MD      senna  2 tablet Oral HS Crispin Arana MD      sodium chloride  20 mL/hr Intravenous Once PRN Crispin Arana MD         VTE Pharmacologic Prophylaxis: HSQ  Code Status: Level 1 - Full Code  Current Length of Stay: 1 day(s)    Administrative Statements     ** Please Note:  voice to text  software may have been used in the creation of this document. Although proof errors in transcription or interpretation are a potential of such software**

## 2025-05-08 NOTE — PROGRESS NOTES
SLP TAA       05/08/25 0845   Patient Data   Rehab Impairment Impairment of mobility, safety, Activities of Daily Living (ADLs), and cognitive/communication skills due to Brain Dysfunction:  02.1  Non-Traumatic  Etiologic: Primary CNS lymphoma   Etiologic Diagnosis Primary CNS lymphoma   Date of Onset 03/29/25   Support System   Name Pt reported living w/ spouse Naldo but does work full time. Reported children but are not local   Restrictions/Precautions   Precautions Bed/chair alarms;Cognitive;Fall Risk;Impulsive;Multiple lines;Supervision on toilet/commode   Pain Assessment   Pain Assessment Tool 0-10   Pain Score No Pain   Comprehension   Assist Devices Glasses   Auditory Basic   Visual Basic   Findings Pt only able to complete informal cognitive assessment. Refer to below for full details.   QI: Comprehension 2. Sometimes Understands: Understands only basic conversations or simple, direct phrases. Frequently requires cues to understand   Comprehension (FIM) 3 - Understands basic info/conversation 50-74% of time   Expression   Verbal Basic   Non-Verbal Basic   Intelligibility Sentence   Findings Pt only able to complete informal cognitive assessment. Refer to below for full details.   QI: Expression 3. Exhibits some difficulty with expressing needs and ideas (e.g., some words or finishing thoughts) or speech is not clear   Expression (FIM) 3 - Expresses basic info/needs 50-74% of time   Social Interaction   Cooperation with staff   Participation Individual   Behaviors observed Appropriate  (flat affect)   Findings Pt only able to complete informal cognitive assessment. Refer to below for full details.   Social Interaction (FIM) 5 - Interacts appropriately with others 90% of time   Problem Solving   Complex Manages finances;Manages discharge planning;Manages medications   Findings Pt only able to complete informal cognitive assessment. Refer to below for full details.   Problem solving (FIM) 1 - Needs direction  "nearly all the time   Memory   Recognize People No   Remember Routine No   Initiates Tasks Yes   Short-Term Impaired   Long Term Impaired   Recalls Precaution No   Findings Pt only able to complete informal cognitive assessment. Refer to below for full details.   Memory (FIM) 1 - Recognizes, recalls/performs less than 25%   Discharge Information   Patient's Discharge Plan home w/ family support/supervision   Patient's Rehab Expectations \"to get helped out\"   Barriers to Discharge Home Limited Family Support;Decreased Cognitive Function;Decreased Strength;Decreased Endurance;Safety Considerations   Impressions Pt is a 65 y/o male w/ PMH of T2DM, GERD, HLD, HTN, NAFLD and sleep apnea who initially presented to the [facility] on 3/29/25 for increased confusion in the setting of recently discovered brain mass. Family reported that pt was having increased forgetfulness and confusion over the past several months. Initial workup done was negative. He was seen at [facility] ED on 3/24 and had a CTH that was concerning for a brain lesion. 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 There was an initial plan to have an expedited outpatient NSGY evaluation, however pt had worsening of his confusion and double vision, prompting this hospitalization. CTH 3/29 was stable. CTA C/A/P 3/29 showed a RLL pulmonary nodule and a non-specific lesion in the hepatic lobe. For further investigation, MRI abdomen was done 3/30 and revealed a simple right hepatic lobe cyst. A diagnostic LP was done on 3/31 and findings were suspicious for CNS lymphoma. Repeat CTH on 4/3 showed worsening hydrocephalus. Repeat MRI 4/11 showed progression of disease from MRI scan on 3/30 with interval enlargement of the dominant " left anterior basal ganglionic mass with more vasogenic edema and mass effect and persistent leptomeningeal and intraventricular seeding with obstructive hydrocephalus. On 4/13, pt had change in mental status. CTH remained stable, but pt ultimately required intubation for airway protection and was admitted to the ICU. CSF studies done on 4/13 and are pending final results. Biopsy w/ EVD placement was done on 4/14 by [facility]. Patient self-extubated himself on 4/14. Preliminary biopsy studies showed small round blue cell tumor suggestive of NHL. Pt's mentation and function slowly improved with improvement in his hydrocephalus. Hospital course complicated by LETY, which is improving, intermittent agitated delirium, requiring antipsychotics and 1:1 virtual sitter, and E.coli UTI/bacteremia now s/p antibiotics. EVD was removed on 4/26. Pt was evaluated by skilled therapies to where he demonstrated the ability to participate in therapy for 3 hours a day. Pt was medically stable and appropriate for acute inpatient rehabilitation and was transitioned to [facility] on 5/7/2025.    Currently SLP services were consulted for assessment of cognitive linguistic skills. Pt able to complete formalized cognitive assessment in which pt is demonstrating cognitive skills to be severely impaired at this time. Pt is a a good rehab candidate to achieve supervision level for functional cognitive skills upon anticipated discharge home w/ family support/supervision. Barriers which present at this time include: decreased attention, LT memory recall, ST memory recall , problem solving, reasoning, sequencing, organization of thoughts, judgement, slower processing, insight, and as well as likely deficits in language skills . In order to address the noted barriers, skilled SLP services will address this by targeting the following interventions: visual orientation checklist, memory book/memory packet, verbal problem solving task, visual memory recall  tasks, drawing conclusions activities, written sequencing tasks, categorization tasks , picture problem solving activities, functional reading tasks, word deduction tasks and family education/training. ELOS ~ 2 weeks . At this time, pt will benefit from skilled SLP services targeting functional independence of cognitive linguistic skills in attempts to decrease need for caregiver burden over time.   SLP Therapy Minutes   SLP Time In 0845   SLP Time Out 0930   SLP Total Time (minutes) 45   SLP Mode of treatment - Individual (minutes) 45   SLP Mode of treatment - Concurrent (minutes) 0   SLP Mode of treatment - Group (minutes) 0   SLP Mode of treatment - Co-treat (minutes) 0   SLP Mode of Treatment - Total time(minutes) 45 minutes   SLP Cumulative Minutes 45   Cumulative Minutes   Cumulative therapy minutes 45

## 2025-05-08 NOTE — ASSESSMENT & PLAN NOTE
64 yoM with PMHx of DM2, NAFLD, and HT who presented with progressive encephalopathy found to have multiple scattered nodular cerebral and cerebellar enhancement who underwent two non-diagnostic LPs prompting stereotactic brain biopsy (4/14) which showed large B-cell lymphoma. See oncology progress note on 4/21 for full diagnostic work up. On 4/29, agitation led to infectious work up with showed UA+ and BCx with 2/2 E. Coli sensitive to ceftriaxone. ID was consulted is managing and has approved to restart chemo 5/2.      Treatment Plan: regimen modified from (https://pubmed.ncbi.nlm.nih.gov/28883883/)     High-dose methotrexate every 2 weeks x4 cycles followed by every 4 weeks maintenance (could consider dosing every 4 weeks if he discharges to outside rehab)  C1 (4/17) 8 g/m²  C2 (5/2) 3 g/m², dose-reduced due to LETY, delayed x1 day due to E. coli bacteremia   Urine pH remained above 7.0.  Methotrexate level 24 hours post high-dose methotrexate, from 5/3/2025 was less than 0.10. Repeat levels on 5/5/25 at 0.36. Leucovorin, bicarb gtt was restarted.  Methotrexate level from this morning less than 0.10, no need to further monitor methotrexate level or urine pH.  Patient is due for Rituxan on 5/10/2025, will coordinate same.

## 2025-05-08 NOTE — TREATMENT PLAN
Individualized Plan of Care - [facility] [place]  Alexis Dennison 64 y.o. male MRN: [id number]  Unit/Bed#: -01 Encounter: [id number]     PATIENT INFORMATION  ADMISSION DATE: 5/7/2025  6:13 PM LU CATEGORY:Brain Dysfunction:  02.1  Non-Traumatic   ADMISSION DIAGNOSIS: Lymphoma (HCC) [C85.90]  EXPECTED LOS: 10 to 14 days     MEDICAL/FUNCTIONAL PROGNOSIS  Based on my assessment of the patient's medical conditions and current functional status, the prognosis for attaining medical and functional goals or the IRF stay is:  Good    Medical Goals: Patient will be medically stable for discharge to [facility], [place], operated by [facility] upon completion of rehab program    Cardiopulmonary function: Ensure cardiopulmonary stability and optimize cardiopulmonary function not only at rest but with activity as patient's activity level significantly increases in acute rehab compared with prior to transfer in preparation for safe discharge from [facility].  Must closely and frequently monitor blood pressure and HR to ensure adequate cardiac output during ADLs and ambulation as patient is at increased risk for orthostatic hypotension/syncope and potential injury if not monitored for and managed adequately.    Blood pressure management:    Frequent monitoring of blood pressure with appropriate adjustments in blood pressure medication management to optimize blood pressure control and prevent/limit renal complications.   Monitoring impact of blood pressure and side-effects of blood pressure medications at rest and with activity.  Hypoxia prevention: Ensure appropriate level of oxygenation at rest and with activity to avoid symptomatic hypoxia, maximize functional performance, and decrease risk of atelectasis/pneumonia through close and frequent monitoring, providing appropriate respiratory treatments (such as incentive spirometry), and when necessary provide/adjust respiratory medications.    DM: Patient will improve/maintain blood sugar  control to ensure optimal healing and decrease risks of complications associated with DM through medication monitoring, adjustments as indicated, and optimal dietary intake and education.  Pain management:  Pain will improve with frequent evaluation of pain, careful adjustments in medications, frequent re-evaluation of patient's pain and medical/neurologic status to ensure optimal pain control, avoidance of potential serious and even life-threatening side-effects and drug interactions, as well as weaning pain medications as soon as possible to decrease risk of short and long-term use.     Brain injury:  Patient's cognitive status is significantly impaired and neurologic status can fluctuate particularly early in rehabilitation process.  Patient requires frequent rehab physician and rehab nursing neurologic monitoring for subtle and more significant changes in mentation and neurologic function.  Labs must be monitored closely along with hydration and nutritional status.  Low threshold to obtain brain imaging.  Medications must be carefully monitored and adjusted to optimize functional recovery while limit cognitive impairments.  Goal to improve cognitive and neurologic function, provide appropriate patient (and/or family education), and to ensure appropriate optimal discharge plan.    Neurologic Disorder: Large B cell lymphoma causing impaired mobility, ADLs, and gait:  intensive skilled therapies with physical therapy and occupational therapy with close oversight and management by rehab specialized physician in acute rehabilitation setting to most expeditiously and effectively improve functional mobility, transfers, upper and lower body strengthening, conditioning, balance, and gait training with appropriate assistive device.  Patient will have optimal supervision and management of patient's underlying neurologic disorder with specialized rehabilitation physician during this period of recovery to ensure most  expeditious and optimal recovery with decreased risks of fall/injury and other complications including acute worsening of neuro disorder, decrease risk of VTE, PNA, and skin ulceration.  Cognitive impairment: intensive skilled therapies including speech language pathology and occupation therapy to evaluate and treat deficits in cognition.  Close oversight and management by rehab specialized physician of cognitive deficits and potential confounding factors (prevention of, monitoring for, and if found treatment of possible complications such as infections, as well as optimally managing sleep, mood, and medications which can negatively impact cognition and functional recovery).  Inpatient rehabilitation education/teaching:  To be provided to patient and typically family/caregiver (if able to be identified) by all skilled therapists, rehab nursing, case management, and rehab specialized physician to ensure optimal recovery and decrease risks of complications in both acute rehabilitation setting as well as after discharge.   Cognitive and Neurobehavioral Dysfunction:  Patient's mood, cognition, and behavior and it's impact on therapy participation and functional recovery will improve during course with monitoring neuro exam, labs, vitals, sleep, and mood.  Adjustments in non-pharmacologic/environmental management including supportive counseling and when necessary pharmacologic management.  Requires frequent re-assessment and close management to ensure optimal mood, cognitive, and behavior management during acute rehab course as well as patient (family/caregiver when available) education and training to ensure appropriate management during acute rehab course with planning for outpatient management to ensure optimal mental health, cognitive, and functional recovery.     Bladder dysfunction:  Appropriate bladder management with appropriate toileting program from rehab nursing and staff with oversight management by  rehabilitation physicians which include appropriate monitoring and possible adjustments in medications to with goals to optimize bladder function and decrease risk of bladder retention, incontinence, and urinary tract infection.   Bowel dysfunction: Appropriate bowel management with appropriate toileting program from rehab nursing and staff with oversight management by rehabilitation physicians which include adjustments in medications to optimize appropriate bowel function and prevent/decrease risk of constipation and bowel obstruction.    Skin management: Increased risk of skin wounds/breakdown/rashes due to recent immobility and co-morbidities.   Appropriate skin checks from nursing and as needed physician.  Frequent turning, application of appropriate barrier creams/dressings, cushions.  Patient/caregiver education.  Optimal bowel/bladder hygiene and mobilization.        ANTICIPATED DISCHARGE DISPOSITION AND SERVICES  COMMUNITY SETTING: Home - with supervision    ANTICIPATED FOLLOW-UP SERVICE:   Outpatient Therapy Services: PT, OT, and SLP          DISCIPLINE SPECIFIC PLANS:  Required Disciplines & Services: Rehabillitation Nursing and Case Management      REQUIRED THERAPY:  Therapy Hours per Day Days per Week   Physical Therapy 1-2 5-6   Occupational Therapy 1-2 5-6   Speech/Language Therapy 1-2 3-5   NOTE: Additional therapy time(s) or changes to allocation of therapies as appropriate to meet patient needs and to achieve functional goals.      Patient will participate in above therapy regimen consisting of PT, OT, and SLP due to the following medical procedure/condition:Brain Dysfunction:  02.1  Non-Traumatic    ANTICIPATED FUNCTIONAL OUTCOMES:  ADL:  supervision to Alcides   Bladder/Bowel: supervision to Alcides   Transfers:  modified independent to supervision   Locomotion:  modified independent to supervision   Cognitive:  A&O x1-2 with improvement in insight/judgement/safety awareness/memory/attention/problem  solving.     DISCHARGE PLANNING NEEDS  Equipment needs: Discharge needs to be reviewed with team      REHAB ANTICIPATED PARTICIPATION RESTRICTIONS:  Amubulate Short Distances Only, Assist with Bathing in Tub, Assist with Mobility, Assist with Tub Shower Transfer, Decreased Insight to Deficits, Decreaed Safety Awareness, Difficulty Expressing Basic Needs, Rquires Assist with ADLS, Requires Assit with Steps, and Unable to perform finances

## 2025-05-09 ENCOUNTER — TELEPHONE (OUTPATIENT)
Dept: HEMATOLOGY ONCOLOGY | Facility: CLINIC | Age: 65
End: 2025-05-09

## 2025-05-09 ENCOUNTER — DOCUMENTATION (OUTPATIENT)
Dept: HEMATOLOGY ONCOLOGY | Facility: CLINIC | Age: 65
End: 2025-05-09

## 2025-05-09 LAB
ALBUMIN SERPL BCG-MCNC: 3.2 G/DL (ref 3.5–5)
ALP SERPL-CCNC: 56 U/L (ref 34–104)
ALT SERPL W P-5'-P-CCNC: 281 U/L (ref 7–52)
ANION GAP SERPL CALCULATED.3IONS-SCNC: 4 MMOL/L (ref 4–13)
AST SERPL W P-5'-P-CCNC: 57 U/L (ref 13–39)
BASOPHILS # BLD AUTO: 0 THOUSANDS/ÂΜL (ref 0–0.1)
BASOPHILS NFR BLD AUTO: 0 % (ref 0–1)
BILIRUB SERPL-MCNC: 0.8 MG/DL (ref 0.2–1)
BUN SERPL-MCNC: 35 MG/DL (ref 5–25)
CALCIUM ALBUM COR SERPL-MCNC: 9.6 MG/DL (ref 8.3–10.1)
CALCIUM SERPL-MCNC: 9 MG/DL (ref 8.4–10.2)
CHLORIDE SERPL-SCNC: 101 MMOL/L (ref 96–108)
CO2 SERPL-SCNC: 33 MMOL/L (ref 21–32)
CREAT SERPL-MCNC: 1.18 MG/DL (ref 0.6–1.3)
EOSINOPHIL # BLD AUTO: 0.02 THOUSAND/ÂΜL (ref 0–0.61)
EOSINOPHIL NFR BLD AUTO: 1 % (ref 0–6)
ERYTHROCYTE [DISTWIDTH] IN BLOOD BY AUTOMATED COUNT: 11.3 % (ref 11.6–15.1)
GFR SERPL CREATININE-BSD FRML MDRD: 64 ML/MIN/1.73SQ M
GLUCOSE P FAST SERPL-MCNC: 128 MG/DL (ref 65–99)
GLUCOSE SERPL-MCNC: 128 MG/DL (ref 65–140)
GLUCOSE SERPL-MCNC: 134 MG/DL (ref 65–140)
GLUCOSE SERPL-MCNC: 169 MG/DL (ref 65–140)
GLUCOSE SERPL-MCNC: 172 MG/DL (ref 65–140)
GLUCOSE SERPL-MCNC: 202 MG/DL (ref 65–140)
HCT VFR BLD AUTO: 24.7 % (ref 36.5–49.3)
HGB BLD-MCNC: 8.8 G/DL (ref 12–17)
IMM GRANULOCYTES # BLD AUTO: 0.02 THOUSAND/UL (ref 0–0.2)
IMM GRANULOCYTES NFR BLD AUTO: 1 % (ref 0–2)
LYMPHOCYTES # BLD AUTO: 1.05 THOUSANDS/ÂΜL (ref 0.6–4.47)
LYMPHOCYTES NFR BLD AUTO: 31 % (ref 14–44)
MCH RBC QN AUTO: 30.7 PG (ref 26.8–34.3)
MCHC RBC AUTO-ENTMCNC: 35.6 G/DL (ref 31.4–37.4)
MCV RBC AUTO: 86 FL (ref 82–98)
MONOCYTES # BLD AUTO: 0.07 THOUSAND/ÂΜL (ref 0.17–1.22)
MONOCYTES NFR BLD AUTO: 2 % (ref 4–12)
MTX SERPL-SCNC: <0.1 UMOL/L
NEUTROPHILS # BLD AUTO: 2.2 THOUSANDS/ÂΜL (ref 1.85–7.62)
NEUTS SEG NFR BLD AUTO: 65 % (ref 43–75)
NRBC BLD AUTO-RTO: 0 /100 WBCS
PLATELET # BLD AUTO: 160 THOUSANDS/UL (ref 149–390)
PMV BLD AUTO: 9.3 FL (ref 8.9–12.7)
POTASSIUM SERPL-SCNC: 4.5 MMOL/L (ref 3.5–5.3)
PROT SERPL-MCNC: 5.1 G/DL (ref 6.4–8.4)
RBC # BLD AUTO: 2.87 MILLION/UL (ref 3.88–5.62)
SODIUM SERPL-SCNC: 138 MMOL/L (ref 135–147)
WBC # BLD AUTO: 3.36 THOUSAND/UL (ref 4.31–10.16)

## 2025-05-09 PROCEDURE — 99232 SBSQ HOSP IP/OBS MODERATE 35: CPT | Performed by: INTERNAL MEDICINE

## 2025-05-09 PROCEDURE — 80053 COMPREHEN METABOLIC PANEL: CPT | Performed by: STUDENT IN AN ORGANIZED HEALTH CARE EDUCATION/TRAINING PROGRAM

## 2025-05-09 PROCEDURE — 97129 THER IVNTJ 1ST 15 MIN: CPT

## 2025-05-09 PROCEDURE — 99233 SBSQ HOSP IP/OBS HIGH 50: CPT | Performed by: STUDENT IN AN ORGANIZED HEALTH CARE EDUCATION/TRAINING PROGRAM

## 2025-05-09 PROCEDURE — 99232 SBSQ HOSP IP/OBS MODERATE 35: CPT | Performed by: NURSE PRACTITIONER

## 2025-05-09 PROCEDURE — 85025 COMPLETE CBC W/AUTO DIFF WBC: CPT | Performed by: STUDENT IN AN ORGANIZED HEALTH CARE EDUCATION/TRAINING PROGRAM

## 2025-05-09 PROCEDURE — 82948 REAGENT STRIP/BLOOD GLUCOSE: CPT

## 2025-05-09 PROCEDURE — 97530 THERAPEUTIC ACTIVITIES: CPT

## 2025-05-09 PROCEDURE — 97110 THERAPEUTIC EXERCISES: CPT

## 2025-05-09 PROCEDURE — 97116 GAIT TRAINING THERAPY: CPT

## 2025-05-09 PROCEDURE — 97130 THER IVNTJ EA ADDL 15 MIN: CPT

## 2025-05-09 RX ADMIN — CHLORHEXIDINE GLUCONATE 15 ML: 1.2 SOLUTION ORAL at 09:40

## 2025-05-09 RX ADMIN — HEPARIN SODIUM 5000 UNITS: 5000 INJECTION INTRAVENOUS; SUBCUTANEOUS at 05:07

## 2025-05-09 RX ADMIN — INSULIN LISPRO 3 UNITS: 100 INJECTION, SOLUTION INTRAVENOUS; SUBCUTANEOUS at 16:28

## 2025-05-09 RX ADMIN — INSULIN GLARGINE 10 UNITS: 100 INJECTION, SOLUTION SUBCUTANEOUS at 22:14

## 2025-05-09 RX ADMIN — DEXAMETHASONE 4 MG: 4 TABLET ORAL at 09:40

## 2025-05-09 RX ADMIN — INSULIN LISPRO 2 UNITS: 100 INJECTION, SOLUTION INTRAVENOUS; SUBCUTANEOUS at 16:28

## 2025-05-09 RX ADMIN — METFORMIN ER 500 MG 1000 MG: 500 TABLET ORAL at 08:00

## 2025-05-09 RX ADMIN — Medication 6 MG: at 22:12

## 2025-05-09 RX ADMIN — QUETIAPINE 50 MG: 25 TABLET ORAL at 22:07

## 2025-05-09 RX ADMIN — DOCUSATE SODIUM 100 MG: 100 CAPSULE, LIQUID FILLED ORAL at 18:36

## 2025-05-09 RX ADMIN — INSULIN LISPRO 4 UNITS: 100 INJECTION, SOLUTION INTRAVENOUS; SUBCUTANEOUS at 11:29

## 2025-05-09 RX ADMIN — HEPARIN SODIUM 5000 UNITS: 5000 INJECTION INTRAVENOUS; SUBCUTANEOUS at 22:07

## 2025-05-09 RX ADMIN — SENNOSIDES 17.2 MG: 8.6 TABLET, FILM COATED ORAL at 11:34

## 2025-05-09 RX ADMIN — INSULIN LISPRO 3 UNITS: 100 INJECTION, SOLUTION INTRAVENOUS; SUBCUTANEOUS at 07:56

## 2025-05-09 RX ADMIN — QUETIAPINE 12.5 MG: 25 TABLET ORAL at 11:38

## 2025-05-09 RX ADMIN — ALLOPURINOL 300 MG: 300 TABLET ORAL at 09:40

## 2025-05-09 RX ADMIN — PANTOPRAZOLE SODIUM 40 MG: 40 TABLET, DELAYED RELEASE ORAL at 05:07

## 2025-05-09 RX ADMIN — HEPARIN SODIUM 5000 UNITS: 5000 INJECTION INTRAVENOUS; SUBCUTANEOUS at 14:55

## 2025-05-09 RX ADMIN — CHLORHEXIDINE GLUCONATE 15 ML: 1.2 SOLUTION ORAL at 20:22

## 2025-05-09 RX ADMIN — DOCUSATE SODIUM 100 MG: 100 CAPSULE, LIQUID FILLED ORAL at 09:40

## 2025-05-09 RX ADMIN — INSULIN LISPRO 2 UNITS: 100 INJECTION, SOLUTION INTRAVENOUS; SUBCUTANEOUS at 22:13

## 2025-05-09 RX ADMIN — ATORVASTATIN CALCIUM 20 MG: 20 TABLET, FILM COATED ORAL at 09:40

## 2025-05-09 RX ADMIN — INSULIN LISPRO 3 UNITS: 100 INJECTION, SOLUTION INTRAVENOUS; SUBCUTANEOUS at 11:29

## 2025-05-09 RX ADMIN — DEXAMETHASONE 4 MG: 4 TABLET ORAL at 20:22

## 2025-05-09 NOTE — CASE MANAGEMENT
Phone call placed to pts daughter Elizabeth, reviewed rehab olegario and cm role. Pt resides with Naldo and they  have been  for 16 years. They used to live in [facility] but about 10 years ago moved to [place]. Naldo works nights in housekeeping for [employer]. Pt is an  for [employer]. All paperwork for pts leave fromwork has been completed thus far and will need to be renewed or updated at the end of June. Jerica is a high school special , her  id Eric and their daughter is Shea.  Pt also has a son Drew, sig other Lyndsay and a daughter Joselin. Pt has two sisters who are supportive but do not live locally, Halle and Mel.  Pt has a niece Inocencia who is a nurse at [employer] in . Pt has no prior experience with [facility] or outpt therapy services. Cm reviewed team mtg process and will follow up with daughter post team on Tuesday.      Naldo can speak some english and understands [employer], however she prefers important and lengthy info to be provided in Mandarin.  Elizabeth will be onsite tomorrow and is aware she can s/'w therapy while present. An aunt will be visisting on Sunday.     Elziabeth is inquiring about pt receiving the Rituximab and wanted to be sure he is receiving it here.  Elizabeth also is requesting permission to take pt outside during her visit.

## 2025-05-09 NOTE — PROGRESS NOTES
"   05/09/25 1400   Pain Assessment   Pain Assessment Tool 0-10   Pain Score No Pain   Restrictions/Precautions   Precautions 1:1;Bed/chair alarms;Cognitive;Fall Risk;Impulsive;Supervision on toilet/commode   Comprehension   Comprehension (FIM) 3 - Understands basic info/conversation 50-74% of time   Expression   Expression (FIM) 3 - Needs to repeat parts of sentences   Social Interaction   Social Interaction (FIM) 5 - Interacts appropriately with others 90% of time   Problem Solving   Problem solving (FIM) 1 - Needs direction nearly all the time   Memory   Memory (FIM) 1 - Recognizes, recalls/performs less than 25%   Speech/Language/Cognition Assessmetn   Treatment Assessment For PM session today, pt was awake, alert in recliner, but did appear to be more fatigued from AM session today. When asking about what pt did complete in prior PT session (which was just finished prior to ST session) pt was not able to recall any specific tasks complete. Of note, pt's sister, Halle called at beginning of session where pt did answer phone (of note, pt's 1:1 stated that he is now locked out of phone due to inability to recall password) and was talking to her briefly. When sister asked about pt being in a different room, pt answered no, but both SLP and 1:1 shaking heads to indicate to pt otherwise. After this, SLP introduced self to sister over the phone, stated working on ongoing orientation, recall abilities of both ST and LT memory. Sister did appropriately end call and was planning to call back after session ended. Of note at end of session, SLP asking pt how he would contact the staff if needing anything, where he did recognize the red button on remote, pressing it but verbalizing that there was \"nothing to press.\" SLP did guide 1:1 sitter to review call bell w/ pt intermittently throughout shift.     Pt's sister's phone call lead to ongoing review of LT biographical recall. SLP did ask about other siblings beside Halle " which pt stated anther sisterMel. Per the phone call from Halle, it appears that they are in [place] which pt did confirm. Reviewed pt's children's names again today, where pt was mildly perseverative on stating sister's names. SLP attempting to have pt name 3 children which were given on eval in yesterday's session but pt unable to elicit on own vs even when SLP was providing initial letter cues for names. SLP provided directive cues for children's names (Elizabeth, Marina and Drew). Still continue to demonstrate difficulty in recalling grandchildren. It is suspected that pt will fluctuate in overall recall given biographical information throughout the day due to fatigue. Plan to reach out to family in upcoming sessions to establish family tree and provide written handout to pt given biographical information to increase accuracy in recall. At this time, pt will continue to benefit from ongoing skilled SLP services targeting functional cognitive skills in hopes for decreasing caregiver burden over time.   SLP Therapy Minutes   SLP Time In 1400   SLP Time Out 1430   SLP Total Time (minutes) 30   SLP Mode of treatment - Individual (minutes) 30   SLP Mode of treatment - Concurrent (minutes) 0   SLP Mode of treatment - Group (minutes) 0   SLP Mode of treatment - Co-treat (minutes) 0   SLP Mode of Treatment - Total time(minutes) 30 minutes   SLP Cumulative Minutes 105   Therapy Time missed   Time missed? No

## 2025-05-09 NOTE — PROGRESS NOTES
Progress Note - PMR   Name: Alexis Dennison 64 y.o. 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  > Started on high dose steroids and methotrexate every 2 weeks for 4 weeks (C1 received 4/18, C2 received 5/2) then maintenance every 4 weeks with Rituximab weekly for 8 weeks (1st dose Thursday 4/24 and second 5/3)  > Admitted to [facility] with ongoing cognitive deficits and delirium    - Continue Decadron 4 mg Q8  - Continue MTX + Rituximab as per Hem/onc  - Daily methotrexate levels  - Hem/Onc following  - NSGY following  - SLP for cognition  Type 2 diabetes mellitus with hyperglycemia, without long-term current use of insulin (HCC)  Lab Results   Component Value Date    HGBA1C 6.6 (H) 02/22/2025       Recent Labs     05/08/25  1548 05/08/25  2044 05/09/25  0606 05/09/25  1031   POCGLU 195* 133 134 202*       Blood Sugar Average: Last 72 hrs:  (P) [id number]    - Metformin 1000mg daily  - Lantus 10u daily with Lispro 8u TID and SSI  - Monitor accuchecks while on steroids  - Management per IM  HTN, goal below 130/80  > Blood pressure controlled off antihypertensives    - Monitor VS  - Management per IM  Mixed hyperlipidemia  - Continue atorvastatin 10 mg daily  Thyroid nodule    Pulmonary nodule  > CTA 3/29: nonspecific 4 mm RLL pulmonary nodule   - follow up outpatient for repeat imaging in 3 months  Liver lesion  > CTA 3/29-  nonspecific 12mm hypodense right hepatic lesion, recommended MRI abdomen  > MRI abdomen 3/30- simple right hepatic lobe cyst    - Follow up outpatient for monitoring  Ambulatory dysfunction  - Fall precautions  - PT/OT  Encephalopathy  > Patient has been pleasantly confused for months in the setting of brain mass.  > acutely worsened due to UTI and bacteremia. S/p 7 day course of antibx  > Continues to lack insight to his current situation and is severely cognitively impaired.  He does not get agitated for long periods of time and is mostly confused and impulsive.  > A&O 1 on [facility] admission    - Continue seroquel 12.5mg at noon with 50mg HS  - PRN Zyprexa 2.5mg IM as needed for agitation  -Overstimulation precautions, frequent re-orientation, re-direction, re-assurance  -Sleep log and agitation monitoring if needed  -Optimize sleep-wake cycle  -Limit sedating medications when possible  - Ensure optimal management electrolytes, nutrition, and hydration  - Ensure optimal bowel/bladder management  - Ensure optimal pain management   -Patient/family/caregiver education and training   -Continue 1:1 due to overall safety, impulsivity, lack of safety awareness and poor insight.  -Fall precautions with frequent rounding; proactive toileting program, patient should not be unattended in bathroom      LETY (acute kidney injury) (HCC)  > Baseline Cr 0.9  > Recently 1.3 improved from before- peak 2.7  > Now s/p bicarb    - IVF per IM/Nephro  - Management at discretion of nephro   E. coli UTI  > Pet met sepsis criteria on 4/29 with confusion and agitation. UA was positive. Urine culture and blood cultures showed Ecoli sensitive to cephalosporins.  > ID consulted and pt completed 7d course of ceftriaxone  > Approved to restart chemo 5/2.    - Monitor for any recurrence of agitation or new fevers/signs of infection  Impaired mobility and activities of daily living  Patient was evaluated by the rehabilitation team MD and an appropriate  candidate for acute inpatient rehabilitation program at this time.  The patient will tolerate 3 hours/day 5 to 7 days/week of intensive physical, occupational in order to obtain goals for community discharge  Due to the patient's functional Compared to their baseline level of function in addition to their ongoing medical needs, the patient would benefit from daily supervision from a rehabilitation physician as well as rehabilitation nursing to implement and adjust the medical as well as functional plan of care in order to meet the patient's goals.  HTN (hypertension)  Had been on losartan but holding at this time was her blood pressures are good  Transaminitis  CMP checked on 5/9 and AST is down to 57 from 111 and ALT to 281 from 322  ALT and AST were elevated  Sepsis (HCC)    E coli bacteremia    Leukopenia    At risk for acid-base imbalance    Electrolyte imbalance risk      Subjective   Patient is a 64-year-old male with history of type 2 diabetes, GERD, hyperlipidemia and hypertension on nonalcoholic fatty liver disease and sleep apnea who presents to [facility] on 3/29/2025 for increasing confusion. He had been recently diagnosed with a brain mass and family reported that he had been having increased symptoms including forgetfulness over the past several months. Initial workup was negative however he was seen in the ER on 3/24 and a CT of the head was concerning for brain lesion. 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 Additional areas of enhancement in the basal ganglia on the left as well as the left cerebral peduncle/left quirino. There was an initial plan to have an outpatient neurosurgery evaluation but because of his worsening symptoms including visual deficits he would came to the ER sooner. A CT of the chest abdomen pelvis was completed on 3/29 showing a right lower lobe pulmonary nodule and nonspecific  hepatic lesion and MRI was completed revealing a simple right hepatic lobe cyst. A diagnostic lumbar puncture was completed on 3/31 suspicious for CNS lymphoma and a repeat CT completed on 4/3 showed worsening hydrocephalus and MRI on 4/11 showed disease progression. Biopsy was done and EVD placed on 4/14 and the patient self extubated postprocedure. The preliminary results from the biopsy were indicative of a small round blue cell tumor suggestive of non-Hodgkin's lymphoma. He was started on high-dose steroids, methotrexate and rituximab by oncology but did have a complicated course including LETY, intermittent agitated delirium requiring medications as well as continuous observation as well as an E. coli UTI with bacteremia status post course of antibiotics. The EVD was removed on 4/26.     Chief Complaint: f/u non-traumatic brain injury and f/u ambulatory dysfunction    Interval: Patient seen and evaluated during therapy sessions.  He is working with occupational therapy going over pictures 1 with a safe action and 1 with a risky or dangerous action and having significant difficulty with distinguishing between the 2.  We spoke for some time and remains cognitively impaired and limited insight to his current situation but able to hold a conversation.  No agitation or impulsivity at this time.  Not having any fever, chills, nausea, vomiting, cough, shortness of breath, diarrhea or constipation.  Eating 100% of his meals, voiding without incontinence and his last bowel movement was on 5/7.  Discussed case briefly in morning meeting today and plan for family meeting early during course of therapies in order to determine goals and discharge planning.  Additionally spoke briefly with internal medicine as well as hematology/oncology and director about his Rituxan infusions that will be occurring over the weekend.  Reviewed labs which included CMP with overall reduction in the AST and ALT which we will been trending.   Additionally the CBC shows stable hemoglobin and leukopenia    Objective :  Temp:  [98 °F (36.7 °C)-98.4 °F (36.9 °C)] 98 °F (36.7 °C)  HR:  [60-71] 60  BP: (114-131)/(61-80) 114/61  Resp:  [16-18] 18  SpO2:  [95 %-96 %] 96 %  O2 Device: None (Room air)    Functional Update:  Mobility: Min assist ambulating without assistive device with loss of balance  Transfers: Min assist for transfers  ADLs: Set up for eating, contact-guard for oral hygiene, min assist for bathing and mod assist for dressing    Physical Exam  Vitals reviewed.   Constitutional:       Appearance: Normal appearance.   HENT:      Head:      Comments: Biopsy site healed well on the left anterior cranial region     Right Ear: External ear normal.      Left Ear: External ear normal.      Nose: Nose normal. No rhinorrhea.      Mouth/Throat:      Mouth: Mucous membranes are moist.      Pharynx: Oropharynx is clear.   Eyes:      General: No scleral icterus.  Cardiovascular:      Rate and Rhythm: Normal rate.   Pulmonary:      Effort: Pulmonary effort is normal. No respiratory distress.      Breath sounds: Normal breath sounds.   Abdominal:      General: There is no distension.      Palpations: Abdomen is soft.   Musculoskeletal:      Right lower leg: No edema.      Left lower leg: No edema.   Skin:     General: Skin is warm and dry.   Neurological:      Mental Status: He is alert.      Gait: Gait abnormal.      Comments: Oriented to self.  Confused and with limited insight as well as safety awareness   Psychiatric:         Mood and Affect: Mood normal.         Behavior: Behavior normal.           Scheduled Meds:  Current Facility-Administered Medications   Medication Dose Route Frequency Provider Last Rate    acetaminophen  650 mg Oral Q6H PRN Crispin Arana MD      allopurinol  300 mg Oral Daily Crispin Arana MD      alteplase  2 mg Intracatheter Q1MIN PRN Crispin Arana MD      aluminum-magnesium hydroxide-simethicone  30 mL Oral Q4H PRN Crispin  MD Yasmeen      atorvastatin  20 mg Oral Daily Crispin Arana MD      bisacodyl  10 mg Rectal Daily PRN Jessica Arreola, DO      calcium carbonate  1,000 mg Oral Daily PRN Crispin Arana MD      chlorhexidine  15 mL Mouth/Throat Q12H [facility] Crispin Arana MD      dexamethasone  4 mg Oral Q12H CAIT Arana MD      Followed by    [START ON 5/11/2025] dexamethasone  2 mg Oral Q12H CAIT Arana MD      Followed by    [START ON 5/15/2025] dexamethasone  1 mg Oral Q12H [facility] Crispin Arana MD      docusate sodium  100 mg Oral BID Jessica BHUPENDRA Arreola,       heparin (porcine)  5,000 Units Subcutaneous Q8H [facility] Crispin Arana MD      insulin glargine  10 Units Subcutaneous HS Crispin Arana MD      insulin lispro  2-12 Units Subcutaneous 4x Daily (AC & HS) Crispin Arana MD      insulin lispro  3 Units Subcutaneous TID With Meals PATI Porras      melatonin  6 mg Oral HS Crispin Arana MD      metFORMIN  1,000 mg Oral Daily Crispin Arana MD      OLANZapine  5 mg Intramuscular BID PRN Crispin Arana MD      ondansetron  4 mg Intravenous Q6H PRN Crispin Arana MD      oxyCODONE  2.5 mg Oral Q4H PRN Crispin Arnaa MD      Or    oxyCODONE  5 mg Oral Q4H PRN Crispin Arana MD      pantoprazole  40 mg Oral Early Morning Crispin Arana MD      polyethylene glycol  17 g Oral Daily PRN Jessica Arreola DO      QUEtiapine  12.5 mg Oral Daily Crispin Arana MD      QUEtiapine  50 mg Oral HS Crispin Arana MD      senna  2 tablet Oral Daily With Lunch Jessica Arreola,       sodium chloride  20 mL/hr Intravenous Once PRN Crispin Arana MD           Lab Results: I have reviewed the following results:  Results from last 7 days   Lab Units 05/09/25  0512 05/08/25  0552 05/07/25  0443   HEMOGLOBIN g/dL 8.8* 8.6* 8.7*   HEMATOCRIT % 24.7* 24.2* 25.3*   WBC Thousand/uL 3.36* 2.63* 3.11*   PLATELETS Thousands/uL 160 195 213     Results from last 7 days   Lab Units 05/09/25  0512 05/08/25  0552 05/07/25  0443  05/06/25  0500 05/05/25  0439 05/04/25  0533 05/03/25  0521   BUN mg/dL 35* 36* 40*   < > 28*   < > 30*   SODIUM mmol/L 138 137 138   < > 145   < > 140   POTASSIUM mmol/L 4.5 4.1 4.3   < > 4.1   < > 3.7   CHLORIDE mmol/L 101 100 99   < > 104   < > 103   CREATININE mg/dL 1.18 1.23 1.36*   < > 1.36*   < > 1.52*   CALCIUM, IONIZED mmol/L  --   --   --   --  1.15  --  1.09*   AST U/L 57* 111*  --   --  114*  --   --    ALT U/L 281* 322*  --   --  149*  --   --     < > = values in this interval not displayed.              Joshua Kaminski, DO  Physical Medicine and Rehabilitation  [facility]    I have spent a total time of 50 minutes in caring for this patient on the day of the visit/encounter including Counseling / Coordination of care, Documenting in the medical record, Reviewing/placing orders in the medical record (including tests, medications, and/or procedures), and Communicating with other healthcare professionals .

## 2025-05-09 NOTE — TELEPHONE ENCOUNTER
New Patient Intake Form   Patient Details:    Alexis Dennison  1960    Appointment Information   Who is calling to schedule? Nancy Guzman   If not self, what is the caller's name?   NA   DID YOU CONFIRM INSURANCE WITH PATIENT? E verified, Routed to finance   Referring provider Dr. Dumont   What is the diagnosis? Primary central nervous system (CNS) lymphoma     Is there a confirmed tissue diagnosis?   Brain bx taken on 4/14     Is there a biopsy ordered or pending?  Please specify dates  If yes, route to NN/OCC   See above     Is patient aware of diagnosis?   Yes     Have you had any imaging or labs done?  If yes, where?  (If imaging done outside of [facility], please remind patient to bring a disk.) Yes-[facility]     If imaging done at outside facility, did you instruct patient to obtain discs and bring to visit? NA   Have you been seen by another Oncologist/Hematologist?  If so, who and where? No   Are the records in [facility] or Care Everywhere? Yes   Does the patient have records at another facility/hospital?    If yes, Name of facility, city and state where facility is located. No     Did you instruct patient to have records faxed to [facility] and provide rightfax number?   NA   Preferred [place]   Is the patient willing to be seen by another provider?  (This is for breast patients only) NA     Did you send new patient paperwork?  Email or mail? NA   Miscellaneous Information: The patient is scheduled for his HFU appointment on 5/23 at 0840 in the [facility] office with Dr. Phan.

## 2025-05-09 NOTE — ASSESSMENT & PLAN NOTE
CMP checked on 5/9 and AST is down to 57 from 111 and ALT to 281 from 322  ALT and AST were elevated

## 2025-05-09 NOTE — ASSESSMENT & PLAN NOTE
-Urinalysis 5/6 relatively bland  - CT scan 4/30-kidneys unremarkable and no hydronephrosis.  Large amount of stool.  - Admission creatinine was 1 mg/dL  - Peak creatinine 2.7 mg/dL on 4/22 and creatinine was slowly improving back to 1.3 mg/dL on 5/4 and slightly rising on 5/6 to 1.5 mg/dL     To note, after methotrexate infusion 5/2, her level was less than 0.1 on 5/3 but level repeated on 5/5 is 0.36.  This level returned on 5/6 and patient was restarted on leucovorin and attempted alkalinization of urine with bicarbonate based fluids.     To note patient was also being treated for E. coli bacteremia possibly urinary source.  Infectious disease team is also on board.     5/6 overall, patient's renal function had stabilized around 1.3 mg/dL.  Was slightly higher on 5/6.  Patient is being restarted on volume expansion.  Electrolytes metabolic state is appropriate.  Will check PVR for completeness.  Repeat urine today is relatively bland.  Patient was restarted on bicarbonate drip due to methotrexate level 0.3 on 5/5 5/7-creatinine stable 1.3    5/8-creatinine improving 1.2.  Completed bicarbonate infusion    5/9-off intravenous fluids.  Creatinine improved 1.18 mg/dL.  Bicarbonate 33 will monitor for now as now patient is off bicarbonate drip.  Sodium potassium appropriate.     Plan  - Lab work next on Monday from renal standpoint  - Please call the renal team if issues arise over the weekend  - Continue holding intravenous fluids  - Please be cautious with metformin use  - Noted oncology team is restarting leucovorin and bicarb drip and oncology team will be managing bicarbonate drip and leucovorin and attempt to alkalinize urine  - Oncology team will follow urine pH and methotrexate level  - I/os; avoid nephrotoxic agents  - Avoid hypotension  - Trend methotrexate level per primary oncology team  - Check PVR

## 2025-05-09 NOTE — ASSESSMENT & PLAN NOTE
"AST is 111, previously 114,  ALT is 322 previously 149  D/w H-O, they are aware =  \"Could be due to recent chemotherapy versus antibiotics versus liver lesions\".    CMP 5/9/25 shows AST 57 (previously 111),  (previously 322)  If trends up, will need repeat imaging  "

## 2025-05-09 NOTE — PROGRESS NOTES
"Progress Note - Hospitalist   Name: Alexis Dennison 64 y.o. male I MRN: [id number]  Unit/Bed#: -01 I Date of Admission: 5/7/2025   Date of Service: 5/10/2025 I Hospital Day: 3    Assessment & Plan  Primary central nervous system (CNS) lymphoma  Patient with development of worsening confusion, and was seen in the ED on 3/24/2025.  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  Lymphoma/leukemia panel was nondiagnostic  CTH 4/3/25: concerning for worsening hydrocephalus.   Repeat LP 4/7/25 = undiagnostic again   MRI brain 4/11/25: \"Interval enlargement of the dominant left anterior basal ganglionic mass lesion with greater surrounding vasogenic edema and mass effect. 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  S/p EVD, self removed 4/26  S/p Keppra 500mg BID x 7 days for postoperative seizure ppx   Started on chemotherapy during hospital stay and is for weekly Rituximab and Methotrexate every 2 weeks  Daily serum methotrexate levels until level is less than 0.1  Per heme-onc monitoring urine pH and Methotrexate levels do not need further monitoring at this time   Continue Dexamethasone taper = LD will be 5/19/25  Maintain PICC line  H-O following for Rituxan today 5/10/25  Type 2 diabetes mellitus with hyperglycemia, without long-term current use of insulin ([facility])  Hemoglobin A1C was 6.6 on 2/22/25  Sees End[facility] in [place]  Home:  Janumet XR  qd  Here: " "Lantus 10 U qhs/Lispro 3 U TID/Metformin 1000 mg qd  On DM diet  Insulin wasn't here 5/8 AM so didn't get Lispro with breakfast which explains why lunch BS was elevated.   Metformin was ordered on transfer to be restarted 5/8/25 AM  5/8/25:  BS at dinnertime 195, evening of 5/7/25 was 250.  Since Metformin was just started 5/8/25 AM, reduced Lispro to 3U TID from 8U   Will increase Lispro WM to 4U TID today 5/10/25  BSs should continue to improve with steroid being tapered  Mixed hyperlipidemia  Continue atorvastatin  Thyroid nodule  TSH/free T4 4/21/25 = 0.019/1.13  Nonemergent outpatient endocrine follow-up.   Will require outpatient TSH, free T4, +/- further imaging with endocrine such as radioactive uptake   Pulmonary nodule  CT chest 3 months follow-up  Liver lesion  Patient will require outpatient follow-up  Encephalopathy  Improving  Continue Seroquel  Continue steroid taper  On 1:1  LETY (acute kidney injury) (HCC)  Monitor status post methotrexate  Previous baseline as OP 5/2023-2/22/25 was 1.1 to 1.0  CMP 5/9/25 with creatinine of 1.18 down from 1.23 on 5/8 and today 1.15  Sepsis (HCC)  Resolved  Continue to monitor off antibiotics  E coli bacteremia  Patient completed 7 days of IV cefazolin which was finished on May 5  Was due to E. Coli UTI  HTN (hypertension)  Home:  Losartan -25 qd  Here:  no meds for now since BPs are ok  Transaminitis  AST is 111, previously 114,  ALT is 322 previously 149  D/w H-O, they are aware =  \"Could be due to recent chemotherapy versus antibiotics versus liver lesions\".    CMP 5/9/25 shows AST 57 (previously 111),  (previously 322)  If trends up, will need repeat imaging  Leukopenia  WBC is stable at 3.3 on 5/9/25  H-O following  Hyponatremia  Mild at 132  Will watch    The above assessment and plan was reviewed and updated as determined by my evaluation of the patient on 5/10/2025.    History of Present Illness   Patient seen and examined. Patients overnight " issues or events were reviewed with nursing staff. New or overnight issues include the following:   No new or overnight issues.  Offers no complaints    Review of Systems   All other systems reviewed and are negative.      Objective :  Temp:  [97.8 °F (36.6 °C)-98.8 °F (37.1 °C)] 98.8 °F (37.1 °C)  HR:  [60-66] 60  BP: (120-134)/(63-73) 120/63  Resp:  [16-18] 16  SpO2:  [97 %-100 %] 97 %  O2 Device: None (Room air)    Invasive Devices       Peripherally Inserted Central Catheter Line  Duration             PICC Line 05/01/25 Left Brachial 8 days              Drain  Duration             External Urinary Catheter 7 days                    Physical Exam  General Appearance: no distress, nontoxic appearing  HEENT:  External ear normal.  Nose normal w/o drainage. Mucous membranes are moist. Oropharynx is clear. Conjunctiva clear w/o icterus or redness.  Neck:  Supple, normal ROM  Lungs: BBS without crackles/wheeze/rhonchi; respirations unlabored with normal inspiratory/expiratory effort.  No retractions noted.  On RA  CV: regular rate and rhythm; no rubs/murmurs/gallops, PMI normal   ABD: Abdomen is soft.  Bowel sounds all quadrants.  Nontender with no distention.    EXT: no edema  Skin: normal turgor, normal texture  Psych: affect flat, mood subdued  Neuro: AA       The above physical exam was reviewed and updated as determined by my evaluation of the patient on 5/10/2025.      Lab Results: I have reviewed the following results:  Results from last 7 days   Lab Units 05/09/25  0512 05/08/25  0552   WBC Thousand/uL 3.36* 2.63*   HEMOGLOBIN g/dL 8.8* 8.6*   HEMATOCRIT % 24.7* 24.2*   PLATELETS Thousands/uL 160 195     Results from last 7 days   Lab Units 05/09/25  0512 05/08/25  0552   SODIUM mmol/L 138 137   POTASSIUM mmol/L 4.5 4.1   CHLORIDE mmol/L 101 100   CO2 mmol/L 33* 32   BUN mg/dL 35* 36*   CREATININE mg/dL 1.18 1.23   CALCIUM mg/dL 9.0 8.5             Results from last 7 days   Lab Units 05/10/25  0615  05/09/25 2059 05/09/25  1537   POC GLUCOSE mg/dl 149* 169* 172*       Imaging Results Review: No pertinent imaging studies reviewed.  Other Study Results Review: No additional pertinent studies reviewed.    Review of Scheduled Meds: Medications  reviewed and reconciled as needed  Current Facility-Administered Medications   Medication Dose Route Frequency Provider Last Rate    acetaminophen  650 mg Oral Q6H PRN Crispin Arana MD      allopurinol  300 mg Oral Daily Crispin Arana MD      alteplase  2 mg Intracatheter Q1MIN PRN Crispin Arana MD      aluminum-magnesium hydroxide-simethicone  30 mL Oral Q4H PRN Crispin Arana MD      atorvastatin  20 mg Oral Daily Crispin Arana MD      bisacodyl  10 mg Rectal Daily PRN Jessica Arreola DO      calcium carbonate  1,000 mg Oral Daily PRN Crispin Arana MD      chlorhexidine  15 mL Mouth/Throat Q12H [facility] Crispin Arana MD      dexamethasone  4 mg Oral Q12H [facility] Crispin Arana MD      Followed by    [START ON 5/11/2025] dexamethasone  2 mg Oral Q12H [facility] Crispin Arana MD      Followed by    [START ON 5/15/2025] dexamethasone  1 mg Oral Q12H [facility] Crispin Arana MD      docusate sodium  100 mg Oral BID Jessica Arreola DO      heparin (porcine)  5,000 Units Subcutaneous Q8H [facility] Crispin Arana MD      insulin glargine  10 Units Subcutaneous HS Crispin Arana MD      insulin lispro  2-12 Units Subcutaneous 4x Daily (AC & HS) Crispin Arana MD      insulin lispro  3 Units Subcutaneous TID With Meals PATI [place]      melatonin  6 mg Oral HS Crispin Arana MD      metFORMIN  1,000 mg Oral Daily Crispin Arana MD      OLANZapine  5 mg Intramuscular BID PRN Crispin Arana MD      ondansetron  4 mg Intravenous Q6H PRN Crispin Arana MD      oxyCODONE  2.5 mg Oral Q4H PRN Crispin Arana MD      Or    oxyCODONE  5 mg Oral Q4H PRN Crispin Arana MD      pantoprazole  40 mg Oral Early Morning Crispin Arana MD      polyethylene glycol  17 g Oral Daily PRN  Jessica Arreola, DO      QUEtiapine  12.5 mg Oral Daily Crispin Arana MD      QUEtiapine  50 mg Oral HS Crispin Arana MD      senna  2 tablet Oral Daily With Lunch Jessica Arreola, DO      sodium chloride  20 mL/hr Intravenous Once PRN Crispin Arana MD         VTE Pharmacologic Prophylaxis: HSQ  Code Status: Level 1 - Full Code  Current Length of Stay: 3 day(s)    Administrative Statements     ** Please Note:  voice to text software may have been used in the creation of this document. Although proof errors in transcription or interpretation are a potential of such software**

## 2025-05-09 NOTE — PROGRESS NOTES
"   25 1030   Pain Assessment   Pain Assessment Tool 0-10   Restrictions/Precautions   Precautions 1:1;Bed/chair alarms;Cognitive;Fall Risk;Impulsive;Supervision on toilet/commode   Comprehension   Comprehension (FIM) 3 - Understands basic info/conversation 50-74% of time   Expression   Expression (FIM) 4 - Expresses basic info/needs 75-90% of time   Social Interaction   Social Interaction (FIM) 5 - Interacts appropriately with others 90% of time   Problem Solving   Problem solving (FIM) 2 - Needs direction more than ½ time to initiate, plan or complete simple tasks   Memory   Memory (FIM) 1 - Recognizes, recalls/performs less than 25%   Speech/Language/Cognition Assessmetn   Treatment Assessment Pt was awake, alert and in bed but able to engage in session by sitting EOB. It was noted that over time, pt was becoming more tired by laying back as session progressed but required cues to not lay perpendicular on bed. However, it was noted that pt was participatory throughout session but still exhibits some language deficits as pt demonstrated echolalic speech patterns at times after SLP asked a question. SLP reviewed biographical information, to where in today's session, pt elicited a \"home\" address which was in [place]. SLP did prompt for the address in [place], where pt was able to elicit full street address and when prompted city w/o requiring directive cues. However, when probing about where pt was born, pt was not able to elicit whether from [place] vs [place] (or potentially another state), but pt was more aware that he was possibly not correct in the information provided to SLP. When asking for , pt does remain consistent in stating month/date for birthday but still needs cues for the year when born. Pt was not able to recall current age despite providing maximal cues to elicit. Will plan to provide pt w/ written cheat sheet given biographical information to increase recall and awareness given LT biographical " "information.    Otherwise, SLP was able to target more structured task where pt was provided a written concrete category inclusion task. A total of 6 different categories was provided w/ 10 words per category. Pt was to [place] the words which belonged to the stated categories. It was noted that pt was demonstrating fairly good attention to task initially but as pt progressed, more difficulty in his attempts to complete were decreased. Pt's ability to ID words which belonged to the category was 11/60 accuracy. Pt required MODERATE cues to ID and additional 11 words which would belong increasing to 22/60 accuracy. However, pt was exhibiting significant difficulty for all remaining words to ID even w/ MAX to DIRECTIVE cues. SLP does suspect some visual perceptual component as well vs possible mental fatigue as task progressed. Lastly, SLP targeting more written expression for address in which he combined both the [place] and [place] addresses discussed. Also, SLP attempted to have pt draw a clock where overall initiation to task was decreased. SLP providing cues to look for the clock in the room to help \"cheat\" where he did locate but numbering was slightly altered. Pt was unable to place hands as stated (10:15). Prior to leaving session, SLP providing pt w/ visual written cues for re-orientation information for current location as well as current month and year straight ahead of pt when he is in bed. Will trial pt's ability to recall location of information as well as hopefully being able to verbally state the information on wall. At this time, pt will continue to benefit from ongoing skilled SLP services targeting cognitive linguistic skills in hopes for decreasing caregiver burden over time.    SLP Therapy Minutes   SLP Time In 1030   SLP Time Out 1100   SLP Total Time (minutes) 30   SLP Mode of treatment - Individual (minutes) 30   SLP Mode of treatment - Concurrent (minutes) 0   SLP Mode of treatment - Group (minutes) 0 "   SLP Mode of treatment - Co-treat (minutes) 0   SLP Mode of Treatment - Total time(minutes) 30 minutes   SLP Cumulative Minutes 75   Therapy Time missed   Time missed? No

## 2025-05-09 NOTE — ASSESSMENT & PLAN NOTE
Hemoglobin A1C was 6.6 on 2/22/25  Sees [facility] in [place]  Home:  Janumet XR  qd  Here: Lantus 10 U qhs/Lispro 3 U TID/Metformin 1000 mg qd  On DM diet  Insulin wasn't here 5/8 AM so didn't get Lispro with breakfast which explains why lunch BS was elevated.   Metformin was ordered on transfer to be restarted 5/8/25 AM  5/8/25:  BS at dinnertime 195, evening of 5/7/25 was 250.  Since Metformin was just started 5/8/25 AM, reduced Lispro to 3U TID from 8U   Will increase Lispro WM to 4U TID today 5/10/25  BSs should continue to improve with steroid being tapered

## 2025-05-09 NOTE — ASSESSMENT & PLAN NOTE
"Patient with development of worsening confusion, and was seen in the ED on 3/24/2025.  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  Lymphoma/leukemia panel was nondiagnostic  CTH 4/3/25: concerning for worsening hydrocephalus.   Repeat LP 4/7/25 = undiagnostic again   MRI brain 4/11/25: \"Interval enlargement of the dominant left anterior basal ganglionic mass lesion with greater surrounding vasogenic edema and mass effect. 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  S/p EVD, self removed 4/26  S/p Keppra 500mg BID x 7 days for postoperative seizure ppx   Started on chemotherapy during hospital stay and is for weekly Rituximab and Methotrexate every 2 weeks  Daily serum methotrexate levels until level is less than 0.1  Per heme-onc monitoring urine pH and Methotrexate levels do not need further monitoring at this time   Continue Dexamethasone taper = LD will be 5/19/25  Maintain PICC line  H-O following for Rituxan today 5/10/25  "

## 2025-05-09 NOTE — ASSESSMENT & PLAN NOTE
Hemoglobin A1C was 6.6 on 2/22/25  Sees [facility] in [place]  Home:  Janumet XR  qd  Here: Lantus 10 U qhs/Lispro 3 U TID/Metformin 1000 mg qd  On DM diet  Insulin wasn't here 5/8 AM so didn't get Lispro with breakfast which explains why lunch BS was elevated.   Metformin was ordered on transfer to be restarted 5/8/25 AM  5/8/25:  BS at dinnertime 195, evening of 5/7/25 was 250.  Since Metformin was just started yesterday AM, reduced Lispro to 3U TID from 8U   Will see how dinner BS is today 5/9 and then decide if needs increased dose of Lispro WM

## 2025-05-09 NOTE — PROGRESS NOTES
05/09/25 1300   Pain Assessment   Pain Assessment Tool 0-10   Pain Score No Pain   Restrictions/Precautions   Precautions 1:1;Bed/chair alarms;Cognitive;Fall Risk;Impulsive;Supervision on toilet/commode   Cognition   Attention Attends with cues to redirect   Memory Decreased short term memory;Decreased recall of precautions   Following Commands Follows one step commands with increased time or repetition   Subjective   Subjective Patient received sitting in recliner with 1:1 in the room   Sit to Stand   Type of Assistance Needed Adaptive equipment;Incidental touching  (gait belt, RW)   Comment Pt completed sit to stand transfer to RW with CGA for balance. Demonstrates appropriate hand placement for transfers   Sit to Stand CARE Score 4   Walk 10 Feet   Type of Assistance Needed Adaptive equipment;Incidental touching  (RW)   Comment Pt ambulated with RW and CGA for balance and safety. Demonstrates impulsivity with sudden stopping of walker, and redirection to task   Walk 10 Feet CARE Score 4   Walk 50 Feet with Two Turns   Type of Assistance Needed Incidental touching;Adaptive equipment  (RW, gait belt)   Comment Pt ambulated with RW, gait belt donned for safety, and CGA from therapist due to balance deficits. Ambulates with decreased gait speed and narrow IRMA. Poor balance when turning his head while walking, but no LOB noted.   Walk 50 Feet with Two Turns CARE Score 4   Walk 150 Feet   Type of Assistance Needed Adaptive equipment;Incidental touching  (RW, gait belt)   Comment Pt ambulated with RW and CGA w gait belt donned for safety up to 180 ft in session. Requires therapist cueing for directions and to avoid hitting obstacles in the hallway   Walk 150 Feet CARE Score 4   Ambulation   Distance Walked (feet) 180 ft   Assist Device Hand Hold   Gait Pattern Inconsistant Vicki;Slow Vicki;Narrow IRMA;Improper weight shift;Step through   Walk Assist Level Minimum Assist   Findings Pt ambulated with HHA with  decreased gait speed, and intermittent tendency to grab onto the wall or carts in the hallway. Trialed without an AD ambulation for assessing safety, and patient unable to do so without physical assist from therapist due to poor balance. LOB with turns requiring [place] for recovery   Does the patient walk? 2. Yes   Wheelchair mobility   Does the patient use a wheelchair? 0. No   Therapeutic Interventions   Strengthening 2x10 seated clamshells with greenTB, 2x10 seated marches with 2# ankle weights, 2x10 LAQ 2# @ ankles, 2x10 hamstring curls with greenTB   Balance Static standing x 3' for toileting, lower body dressing, and personal hygiene. CGA for balance from therapist due to fall risk   Assessment   Treatment Assessment Pt engaged in a 60 minute skilled PT session with focus on functional transfers, gait training, strengthening, and endurance. Pt is limited by weakness, impaired balance, increased fall risk, decreased activity tolerance, decreasd safety awareness, and decreased cognition. Patient required frequent redirecting throughout PT session to stay engaged in therapy. Ambulatory trials with and without an AD completed with patient demonstrating poor balance and tendency to use the handrail in the hallway without and assistive device. With RW, he demonstrated improved balance and safety with need for only CGA from the therapist. He does have tendency to leave the RW and then turn to sit, but with redirection, was able to use the RW for completing the transfer.   Problem List Decreased strength;Decreased range of motion;Decreased endurance;Impaired balance;Decreased mobility;Decreased cognition;Impaired judgement;Decreased safety awareness   PT Barriers   Physical Impairment Decreased strength;Decreased endurance;Impaired balance;Decreased mobility;Decreased cognition;Impaired judgement;Decreased safety awareness   Plan   Treatment/Interventions Functional transfer training;YANE  strengthening/ROM;Elevations;Endurance training;Therapeutic exercise;Patient/family training;Equipment eval/education;Gait training   Progress Progressing toward goals   PT Therapy Minutes   PT Time In 1300   PT Time Out 1400   PT Total Time (minutes) 60   PT Mode of treatment - Individual (minutes) 60   PT Mode of treatment - Concurrent (minutes) 0   PT Mode of treatment - Group (minutes) 0   PT Mode of treatment - Co-treat (minutes) 0   PT Mode of Treatment - Total time(minutes) 60 minutes   PT Cumulative Minutes 120   Therapy Time missed   Time missed? No

## 2025-05-09 NOTE — PROGRESS NOTES
"Progress Note - Hospitalist   Name: Alexis Dennison 64 y.o. male I MRN: [id number]  Unit/Bed#: -01 I Date of Admission: 5/7/2025   Date of Service: 5/9/2025 I Hospital Day: 2    Assessment & Plan  Primary central nervous system (CNS) lymphoma  Patient with development of worsening confusion, and was seen in the ED on 3/24/2025.  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  Lymphoma/leukemia panel was nondiagnostic  CTH 4/3/25: concerning for worsening hydrocephalus.   Repeat LP 4/7/25 = undiagnostic again   MRI brain 4/11/25: \"Interval enlargement of the dominant left anterior basal ganglionic mass lesion with greater surrounding vasogenic edema and mass effect. 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  S/p EVD, self removed 4/26  S/p Keppra 500mg BID x 7 days for postoperative seizure ppx   Started on chemotherapy during hospital stay and is for weekly Rituximab and Methotrexate every 2 weeks  Daily serum methotrexate levels until level is less than 0.1  Per heme-onc monitoring urine pH and Methotrexate levels do not need further monitoring at this time   Continue Dexamethasone taper = LD will be 5/19/25  Maintain PICC line  H-O following and will be giving his Rituxan 5/10/25  Type 2 diabetes mellitus with hyperglycemia, without long-term current use of insulin ([facility])  Hemoglobin A1C was 6.6 on 2/22/25  Sees Endo [facility] in [place]  Home:  Janumet XR  " "qd  Here: Lantus 10 U qhs/Lispro 3 U TID/Metformin 1000 mg qd  On DM diet  Insulin wasn't here 5/8 AM so didn't get Lispro with breakfast which explains why lunch BS was elevated.   Metformin was ordered on transfer to be restarted 5/8/25 AM  5/8/25:  BS at dinnertime 195, evening of 5/7/25 was 250.  Since Metformin was just started yesterday AM, reduced Lispro to 3U TID from 8U   Will see how dinner BS is today 5/9 and then decide if needs increased dose of Lispro WM  Mixed hyperlipidemia  Continue atorvastatin  Thyroid nodule  TSH/free T4 4/21/25 = 0.019/1.13  Nonemergent outpatient endocrine follow-up.   Will require outpatient TSH, free T4, +/- further imaging with endocrine such as radioactive uptake   Pulmonary nodule  CT chest 3 months follow-up  Liver lesion  Patient will require outpatient follow-up  Encephalopathy  Improving  Continue Seroquel  Continue steroid taper  On 1:1  LETY (acute kidney injury) (HCC)  Monitor status post methotrexate  Previous baseline as OP 5/2023-2/22/25 was 1.1 to 1.0  CMP 5/9/25 with creatinine of 1.18 down from 1.23 on 5/8  Sepsis (HCC)  Resolved  Continue to monitor off antibiotics  E coli bacteremia  Patient completed 7 days of IV cefazolin which was finished on May 5  Was due to E. Coli UTI  HTN (hypertension)  Home:  Losartan -25 qd  Here:  no meds for now since BPs are ok  Transaminitis  AST is 111, previously 114,  ALT is 322 previously 149  D/w H-O, they are aware =  \"Could be due to recent chemotherapy versus antibiotics versus liver lesions\".    CMP 5/9/25 shows AST 57 (previously 111),  (previously 322)  If trends up, will need repeat imaging  Leukopenia  WBC is stable at 3.3 today 5/9/25  H-O following    The above assessment and plan was reviewed and updated as determined by my evaluation of the patient on 5/9/2025.    History of Present Illness   Patient seen and examined. Patients overnight issues or events were reviewed with nursing staff. New or " overnight issues include the following:   No new or overnight issues.  Offers no complaints    Review of Systems   All other systems reviewed and are negative.      Objective :  Temp:  [98 °F (36.7 °C)-98.4 °F (36.9 °C)] 98 °F (36.7 °C)  HR:  [60-71] 60  BP: (114-131)/(61-80) 114/61  Resp:  [16-18] 18  SpO2:  [95 %-96 %] 96 %  O2 Device: None (Room air)    Invasive Devices       Peripherally Inserted Central Catheter Line  Duration             PICC Line 05/01/25 Left Brachial 7 days              Drain  Duration             External Urinary Catheter 6 days                    Physical Exam  General Appearance: no distress, non toxic appearing  HEENT: PERRLA, conjuctiva normal; oropharynx clear; mucous membranes moist.  Scalp incision is healing well and w/o erythema/drainage  Neck:  Supple, normal ROM  Lungs: CTA, normal respiratory effort, no retractions, expiratory effort normal  CV: regular rate and rhythm; no rubs/murmurs/gallops, PMI normal   ABD: soft; ND/NT; +BS  EXT: no edema  Skin: normal turgor, normal texture  Psych: affect flat, mood subdued  Neuro: AA        The above physical exam was reviewed and updated as determined by my evaluation of the patient on 5/9/2025.      Lab Results: I have reviewed the following results:  Results from last 7 days   Lab Units 05/09/25  0512 05/08/25  0552   WBC Thousand/uL 3.36* 2.63*   HEMOGLOBIN g/dL 8.8* 8.6*   HEMATOCRIT % 24.7* 24.2*   PLATELETS Thousands/uL 160 195     Results from last 7 days   Lab Units 05/09/25  0512 05/08/25  0552   SODIUM mmol/L 138 137   POTASSIUM mmol/L 4.5 4.1   CHLORIDE mmol/L 101 100   CO2 mmol/L 33* 32   BUN mg/dL 35* 36*   CREATININE mg/dL 1.18 1.23   CALCIUM mg/dL 9.0 8.5             Results from last 7 days   Lab Units 05/09/25  1031 05/09/25  0606 05/08/25  2044   POC GLUCOSE mg/dl 202* 134 133       Imaging Results Review: No pertinent imaging studies reviewed.  Other Study Results Review: No additional pertinent studies  reviewed.    Review of Scheduled Meds: Medications  reviewed and reconciled as needed  Current Facility-Administered Medications   Medication Dose Route Frequency Provider Last Rate    acetaminophen  650 mg Oral Q6H PRN Crispin Arana MD      allopurinol  300 mg Oral Daily Crispin Arana MD      alteplase  2 mg Intracatheter Q1MIN PRN Crispin Arana MD      aluminum-magnesium hydroxide-simethicone  30 mL Oral Q4H PRN Crispin Arana MD      atorvastatin  20 mg Oral Daily Crispin Arana MD      bisacodyl  10 mg Rectal Daily PRN Jessica Arreola, DO      calcium carbonate  1,000 mg Oral Daily PRN Crispin Arana MD      chlorhexidine  15 mL Mouth/Throat Q12H [facility] Crispin Arana MD      dexamethasone  4 mg Oral Q12H CAIT Arana MD      Followed by    [START ON 5/11/2025] dexamethasone  2 mg Oral Q12H CAIT Arana MD      Followed by    [START ON 5/15/2025] dexamethasone  1 mg Oral Q12H [facility] Crispin Arana MD      docusate sodium  100 mg Oral BID Jessica Arreola,       heparin (porcine)  5,000 Units Subcutaneous Q8H CAIT Crispin Arana MD      insulin glargine  10 Units Subcutaneous HS Crispin Arana MD      insulin lispro  2-12 Units Subcutaneous 4x Daily (AC & HS) Crispin Arana MD      insulin lispro  3 Units Subcutaneous TID With Meals PATI Porras      melatonin  6 mg Oral HS Crispin Arana MD      metFORMIN  1,000 mg Oral Daily Crispin Arana MD      OLANZapine  5 mg Intramuscular BID PRN Crispin Arana MD      ondansetron  4 mg Intravenous Q6H PRN Crispin Arana MD      oxyCODONE  2.5 mg Oral Q4H PRN Crispin Arana MD      Or    oxyCODONE  5 mg Oral Q4H PRN Crispin Arana MD      pantoprazole  40 mg Oral Early Morning Crispin Arana MD      polyethylene glycol  17 g Oral Daily PRN Jessica Arreola, DO      QUEtiapine  12.5 mg Oral Daily Crispin Arana MD      QUEtiapine  50 mg Oral HS Crispin Arana MD      senna  2 tablet Oral Daily With Lunch Jessica Arreola, DO      sodium  chloride  20 mL/hr Intravenous Once PRN Crispin Arana MD         VTE Pharmacologic Prophylaxis: HSQ  Code Status: Level 1 - Full Code  Current Length of Stay: 2 day(s)    Administrative Statements     ** Please Note:  voice to text software may have been used in the creation of this document. Although proof errors in transcription or interpretation are a potential of such software**

## 2025-05-09 NOTE — ASSESSMENT & PLAN NOTE
Monitor status post methotrexate  Previous baseline as OP 5/2023-2/22/25 was 1.1 to 1.0  CMP 5/9/25 with creatinine of 1.18 down from 1.23 on 5/8 and today 1.15

## 2025-05-09 NOTE — ASSESSMENT & PLAN NOTE
Monitor status post methotrexate  Previous baseline as OP 5/2023-2/22/25 was 1.1 to 1.0  CMP 5/9/25 with creatinine of 1.18 down from 1.23 on 5/8

## 2025-05-09 NOTE — PROGRESS NOTES
Progress Note - Nephrology   Name: Alexis Dennison 64 y.o. male I MRN: [id number]  Unit/Bed#: -01 I Date of Admission: 5/7/2025   Date of Service: 5/9/2025 I Hospital Day: 2    Assessment & Plan  LETY (acute kidney injury) (HCC)  -Urinalysis 5/6 relatively bland  - CT scan 4/30-kidneys unremarkable and no hydronephrosis.  Large amount of stool.  - Admission creatinine was 1 mg/dL  - Peak creatinine 2.7 mg/dL on 4/22 and creatinine was slowly improving back to 1.3 mg/dL on 5/4 and slightly rising on 5/6 to 1.5 mg/dL     To note, after methotrexate infusion 5/2, her level was less than 0.1 on 5/3 but level repeated on 5/5 is 0.36.  This level returned on 5/6 and patient was restarted on leucovorin and attempted alkalinization of urine with bicarbonate based fluids.     To note patient was also being treated for E. coli bacteremia possibly urinary source.  Infectious disease team is also on board.     5/6 overall, patient's renal function had stabilized around 1.3 mg/dL.  Was slightly higher on 5/6.  Patient is being restarted on volume expansion.  Electrolytes metabolic state is appropriate.  Will check PVR for completeness.  Repeat urine today is relatively bland.  Patient was restarted on bicarbonate drip due to methotrexate level 0.3 on 5/5 5/7-creatinine stable 1.3    5/8-creatinine improving 1.2.  Completed bicarbonate infusion    5/9-off intravenous fluids.  Creatinine improved 1.18 mg/dL.  Bicarbonate 33 will monitor for now as now patient is off bicarbonate drip.  Sodium potassium appropriate.     Plan  - Lab work next on Monday from renal standpoint  - Please call the renal team if issues arise over the weekend  - Continue holding intravenous fluids  - Please be cautious with metformin use  - Noted oncology team is restarting leucovorin and bicarb drip and oncology team will be managing bicarbonate drip and leucovorin and attempt to alkalinize urine  - Oncology team will follow urine pH and methotrexate  level  - I/os; avoid nephrotoxic agents  - Avoid hypotension  - Trend methotrexate level per primary oncology team  - Check PVR  Leukopenia  -Per oncology team  Impaired mobility and activities of daily living  -Currently in rehab unit  HTN (hypertension)  -Currently at goal and not requiring antihypertensive  Transaminitis  Per primary team  At risk for acid-base imbalance  Appropriate for now  Electrolyte imbalance risk  Appropriate for now    I have reviewed the nephrology recommendations including hold intravenous fluids as doing, with primary team advanced practitioner, and we are in agreement with renal plan including the information outlined above.     Subjective   Brief History of Admission - 64-year-old male with a past medical history of recently diagnosed CNS large B-cell lymphoma, obstructive hydrocephalus status post EVD, recently started on admission on rituximab and high-dose methotrexate with rescue leucovorin with LETY in April 2025 in the setting of relative hypotension/methotrexate?, With improvement in renal function for home renal team had signed off on starting April 25 then renal team was reconsulted May 2 as the patient was going to be receiving methotrexate again and given recent acute kidney injury. Patient's creatinine overall had improved from a peak of 2.7 mg/dL and sarahi to 1.3 mg/dL with slight increase to 1.5 mg/dL on 5/3. Received second dose of methotrexate on 5/2 with alkalinization of urine and leucovorin and urine pH checks. Methotrexate level post second dose of methotrexate was less than 0.1. Creatinine was improving by 5/4 to 1.2 mg/dL.  Patient's methotrexate level subsequently returned 0.3 and therefore was restarted briefly on bicarbonate drip.  Methotrexate level is now improved.  Was transferred to the [facility] unit on 5/7.      Past 24-hour no acute issues.  Blood pressures 114-130 systolic.  On room air.        Objective :  Temp:  [98 °F (36.7 °C)-98.4 °F (36.9 °C)] 98 °F  (36.7 °C)  HR:  [60-71] 60  BP: (114-131)/(61-80) 114/61  Resp:  [16-18] 18  SpO2:  [95 %-96 %] 96 %  O2 Device: None (Room air)    Current Weight: Weight - Scale: 75.8 kg (167 lb)  First Weight: Weight - Scale: 75.9 kg (167 lb 6.4 oz)  I/O         05/07 0701 05/08 0700 05/08 0701 05/09 0700 05/09 0701  05/10 0700    P.O.  876     Total Intake(mL/kg)  876 (11.6)     Urine (mL/kg/hr) 1000 900 (0.5)     Total Output 1000 900     Net -1000 -24            Unmeasured Urine Occurrence  2 x           Physical Exam  General: NAD  Skin: no rash  Eyes: anicteric sclera  ENT: moist mucous membrane, head surgical site well-healing  Neck: supple  Chest: CTA b/l, no ronchii, no wheeze, no rubs, no rales  CVS: s1s2, no murmur, no gallop, no rub  Abdomen: soft, nontender, nl sounds  Extremities: no edema LE b/l  : no connell  Neuro: AAOX2  Psych: normal affect    Medications:    Current Facility-Administered Medications:     acetaminophen (TYLENOL) tablet 650 mg, 650 mg, Oral, Q6H PRN, Crispin Arana MD    allopurinol (ZYLOPRIM) tablet 300 mg, 300 mg, Oral, Daily, Crispin Arana MD, 300 mg at 05/08/25 0801    alteplase (CATHFLO) injection 2 mg, 2 mg, Intracatheter, Q1MIN PRN, Crispin Arana MD    aluminum-magnesium hydroxide-simethicone (MAALOX) oral suspension 30 mL, 30 mL, Oral, Q4H PRN, Crispin Arana MD    atorvastatin (LIPITOR) tablet 20 mg, 20 mg, Oral, Daily, Crispin Arana MD, 20 mg at 05/08/25 0801    bisacodyl (DULCOLAX) rectal suppository 10 mg, 10 mg, Rectal, Daily PRN, Jessica Arreola DO    calcium carbonate (TUMS) chewable tablet 1,000 mg, 1,000 mg, Oral, Daily PRN, Crispin Arana MD    chlorhexidine (PERIDEX) 0.12 % oral rinse 15 mL, 15 mL, Mouth/Throat, Q12H CAIT, Crispin Arana MD, 15 mL at 05/08/25 2132    dexamethasone (DECADRON) tablet 4 mg, 4 mg, Oral, Q12H CAIT, 4 mg at 05/08/25 2132 **FOLLOWED BY** [START ON 5/11/2025] dexamethasone (DECADRON) tablet 2 mg, 2 mg, Oral, Q12H CAIT **FOLLOWED BY** [START ON  5/15/2025] dexamethasone (DECADRON) tablet 1 mg, 1 mg, Oral, Q12H CAIT, Crispin Arana MD    docusate sodium (COLACE) capsule 100 mg, 100 mg, Oral, BID, Jessica Arreola DO, 100 mg at 05/08/25 1707    heparin (porcine) subcutaneous injection 5,000 Units, 5,000 Units, Subcutaneous, Q8H CAIT, Crispin Arana MD, 5,000 Units at 05/09/25 0507    insulin glargine (LANTUS) subcutaneous injection 10 Units 0.1 mL, 10 Units, Subcutaneous, HS, Crispin Arana MD, 10 Units at 05/08/25 2132    insulin lispro (HumALOG/ADMELOG) 100 units/mL subcutaneous injection 2-12 Units, 2-12 Units, Subcutaneous, 4x Daily (AC & HS), 2 Units at 05/08/25 1650 **AND** Fingerstick Glucose (POCT), , , 4x Daily AC and at bedtime, Crispin Arana MD    insulin lispro (HumALOG/ADMELOG) 100 units/mL subcutaneous injection 3 Units, 3 Units, Subcutaneous, TID With Meals, PATI Porras, 3 Units at 05/08/25 1651    melatonin tablet 6 mg, 6 mg, Oral, HS, Crispin Arana MD, 6 mg at 05/08/25 2132    metFORMIN (GLUCOPHAGE-XR) 24 hr tablet 1,000 mg, 1,000 mg, Oral, Daily, Crispin Arana MD, 1,000 mg at 05/08/25 0804    OLANZapine (ZyPREXA) IM injection 5 mg, 5 mg, Intramuscular, BID PRN, Crispin Arana MD    ondansetron (ZOFRAN) injection 4 mg, 4 mg, Intravenous, Q6H PRN, Crispin Arana MD    oxyCODONE (ROXICODONE) split tablet 2.5 mg, 2.5 mg, Oral, Q4H PRN **OR** oxyCODONE (ROXICODONE) IR tablet 5 mg, 5 mg, Oral, Q4H PRN, Crispin Arana MD    pantoprazole (PROTONIX) EC tablet 40 mg, 40 mg, Oral, Early Morning, Crispin Arana MD, 40 mg at 05/09/25 0507    polyethylene glycol (MIRALAX) packet 17 g, 17 g, Oral, Daily PRN, Jessica Arreola DO    QUEtiapine (SEROquel) tablet 12.5 mg, 12.5 mg, Oral, Daily, Crispin Arana MD, 12.5 mg at 05/08/25 1129    QUEtiapine (SEROquel) tablet 50 mg, 50 mg, Oral, HS, Crispin Arana MD, 50 mg at 05/08/25 2132    senna (SENOKOT) tablet 17.2 mg, 2 tablet, Oral, Daily With Lunch, Jessica Arreola DO    sodium  "chloride 0.9 % infusion, 20 mL/hr, Intravenous, Once PRN, Crispin Arana MD      Lab Results: I have reviewed the following results:  Results from last 7 days   Lab Units 05/09/25  0512 05/08/25  0552 05/07/25  0443 05/06/25  0500 05/05/25  0439 05/04/25  0533 05/03/25  1555 05/03/25  0521   WBC Thousand/uL 3.36* 2.63* 3.11*  --  2.45*  --  3.01*  --    HEMOGLOBIN g/dL 8.8* 8.6* 8.7*  --  9.9*  --  9.4*  --    HEMATOCRIT % 24.7* 24.2* 25.3*  --  28.8*  --  26.1*  --    PLATELETS Thousands/uL 160 195 213  --  191  --  146*  --    POTASSIUM mmol/L 4.5 4.1 4.3 4.4 4.1 3.8  --  3.7   CHLORIDE mmol/L 101 100 99 105 104 102  --  103   CO2 mmol/L 33* 32 34* 32 33* 33*  --  30   BUN mg/dL 35* 36* 40* 36* 28* 24  --  30*   CREATININE mg/dL 1.18 1.23 1.36* 1.58* 1.36* 1.27  --  1.52*   CALCIUM mg/dL 9.0 8.5 8.4 8.5 8.2* 7.8*  --  7.8*   MAGNESIUM mg/dL  --   --  2.0  --   --   --   --   --    PHOSPHORUS mg/dL  --   --  3.2  --  3.0 2.5  --  2.7   ALBUMIN g/dL 3.2* 3.1*  --   --  3.0*  --   --   --        Administrative Statements     Portions of the record may have been created with voice recognition software. Occasional wrong word or \"sound a like\" substitutions may have occurred due to the inherent limitations of voice recognition software. Read the chart carefully and recognize, using context, where substitutions have occurred.If you have any questions, please contact the dictating provider.  "

## 2025-05-09 NOTE — ASSESSMENT & PLAN NOTE
"Patient with development of worsening confusion, and was seen in the ED on 3/24/2025.  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  Lymphoma/leukemia panel was nondiagnostic  CTH 4/3/25: concerning for worsening hydrocephalus.   Repeat LP 4/7/25 = undiagnostic again   MRI brain 4/11/25: \"Interval enlargement of the dominant left anterior basal ganglionic mass lesion with greater surrounding vasogenic edema and mass effect. 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  S/p EVD, self removed 4/26  S/p Keppra 500mg BID x 7 days for postoperative seizure ppx   Started on chemotherapy during hospital stay and is for weekly Rituximab and Methotrexate every 2 weeks  Daily serum methotrexate levels until level is less than 0.1  Per heme-onc monitoring urine pH and Methotrexate levels do not need further monitoring at this time   Continue Dexamethasone taper = LD will be 5/19/25  Maintain PICC line  H-O following and will be giving his Rituxan 5/10/25  "

## 2025-05-10 PROBLEM — E87.1 HYPONATREMIA: Status: ACTIVE | Noted: 2025-05-10

## 2025-05-10 LAB
ALBUMIN SERPL BCG-MCNC: 3.4 G/DL (ref 3.5–5)
ALP SERPL-CCNC: 61 U/L (ref 34–104)
ALT SERPL W P-5'-P-CCNC: 187 U/L (ref 7–52)
ANION GAP SERPL CALCULATED.3IONS-SCNC: 9 MMOL/L (ref 4–13)
AST SERPL W P-5'-P-CCNC: 19 U/L (ref 13–39)
BILIRUB SERPL-MCNC: 0.65 MG/DL (ref 0.2–1)
BUN SERPL-MCNC: 34 MG/DL (ref 5–25)
CALCIUM ALBUM COR SERPL-MCNC: 9.6 MG/DL (ref 8.3–10.1)
CALCIUM SERPL-MCNC: 9.1 MG/DL (ref 8.4–10.2)
CHLORIDE SERPL-SCNC: 98 MMOL/L (ref 96–108)
CO2 SERPL-SCNC: 25 MMOL/L (ref 21–32)
CREAT SERPL-MCNC: 1.15 MG/DL (ref 0.6–1.3)
GFR SERPL CREATININE-BSD FRML MDRD: 66 ML/MIN/1.73SQ M
GLUCOSE SERPL-MCNC: 149 MG/DL (ref 65–140)
GLUCOSE SERPL-MCNC: 156 MG/DL (ref 65–140)
GLUCOSE SERPL-MCNC: 207 MG/DL (ref 65–140)
GLUCOSE SERPL-MCNC: 228 MG/DL (ref 65–140)
GLUCOSE SERPL-MCNC: 241 MG/DL (ref 65–140)
POTASSIUM SERPL-SCNC: 4.4 MMOL/L (ref 3.5–5.3)
PROT SERPL-MCNC: 5.6 G/DL (ref 6.4–8.4)
SODIUM SERPL-SCNC: 132 MMOL/L (ref 135–147)

## 2025-05-10 PROCEDURE — 97130 THER IVNTJ EA ADDL 15 MIN: CPT

## 2025-05-10 PROCEDURE — 97530 THERAPEUTIC ACTIVITIES: CPT

## 2025-05-10 PROCEDURE — 82948 REAGENT STRIP/BLOOD GLUCOSE: CPT

## 2025-05-10 PROCEDURE — 97129 THER IVNTJ 1ST 15 MIN: CPT

## 2025-05-10 PROCEDURE — 97112 NEUROMUSCULAR REEDUCATION: CPT

## 2025-05-10 PROCEDURE — 99233 SBSQ HOSP IP/OBS HIGH 50: CPT | Performed by: INTERNAL MEDICINE

## 2025-05-10 PROCEDURE — 3E0330M INTRODUCTION OF MONOCLONAL ANTIBODY INTO PERIPHERAL VEIN, PERCUTANEOUS APPROACH: ICD-10-PCS | Performed by: INTERNAL MEDICINE

## 2025-05-10 PROCEDURE — 99232 SBSQ HOSP IP/OBS MODERATE 35: CPT | Performed by: NURSE PRACTITIONER

## 2025-05-10 PROCEDURE — 80053 COMPREHEN METABOLIC PANEL: CPT | Performed by: INTERNAL MEDICINE

## 2025-05-10 RX ORDER — ACETAMINOPHEN 325 MG/1
650 TABLET ORAL ONCE
Status: COMPLETED | OUTPATIENT
Start: 2025-05-10 | End: 2025-05-10

## 2025-05-10 RX ORDER — DIPHENHYDRAMINE HYDROCHLORIDE 50 MG/ML
25 INJECTION, SOLUTION INTRAMUSCULAR; INTRAVENOUS ONCE
Status: COMPLETED | OUTPATIENT
Start: 2025-05-10 | End: 2025-05-10

## 2025-05-10 RX ORDER — INSULIN LISPRO 100 [IU]/ML
4 INJECTION, SOLUTION INTRAVENOUS; SUBCUTANEOUS
Status: DISCONTINUED | OUTPATIENT
Start: 2025-05-10 | End: 2025-05-12

## 2025-05-10 RX ORDER — SODIUM CHLORIDE 9 MG/ML
20 INJECTION, SOLUTION INTRAVENOUS ONCE AS NEEDED
Status: DISCONTINUED | OUTPATIENT
Start: 2025-05-10 | End: 2025-05-17

## 2025-05-10 RX ADMIN — INSULIN LISPRO 2 UNITS: 100 INJECTION, SOLUTION INTRAVENOUS; SUBCUTANEOUS at 20:43

## 2025-05-10 RX ADMIN — RITUXIMAB 800 MG: 10 INJECTION, SOLUTION INTRAVENOUS at 12:34

## 2025-05-10 RX ADMIN — DEXAMETHASONE 4 MG: 4 TABLET ORAL at 20:38

## 2025-05-10 RX ADMIN — ATORVASTATIN CALCIUM 20 MG: 20 TABLET, FILM COATED ORAL at 09:41

## 2025-05-10 RX ADMIN — INSULIN LISPRO 3 UNITS: 100 INJECTION, SOLUTION INTRAVENOUS; SUBCUTANEOUS at 09:47

## 2025-05-10 RX ADMIN — PANTOPRAZOLE SODIUM 40 MG: 40 TABLET, DELAYED RELEASE ORAL at 06:14

## 2025-05-10 RX ADMIN — INSULIN LISPRO 4 UNITS: 100 INJECTION, SOLUTION INTRAVENOUS; SUBCUTANEOUS at 16:54

## 2025-05-10 RX ADMIN — METFORMIN ER 500 MG 1000 MG: 500 TABLET ORAL at 09:47

## 2025-05-10 RX ADMIN — DEXAMETHASONE 4 MG: 4 TABLET ORAL at 09:42

## 2025-05-10 RX ADMIN — QUETIAPINE 12.5 MG: 25 TABLET ORAL at 12:04

## 2025-05-10 RX ADMIN — Medication 6 MG: at 20:38

## 2025-05-10 RX ADMIN — DOCUSATE SODIUM 100 MG: 100 CAPSULE, LIQUID FILLED ORAL at 18:31

## 2025-05-10 RX ADMIN — ACETAMINOPHEN 650 MG: 325 TABLET ORAL at 12:15

## 2025-05-10 RX ADMIN — INSULIN GLARGINE 10 UNITS: 100 INJECTION, SOLUTION SUBCUTANEOUS at 20:37

## 2025-05-10 RX ADMIN — SENNOSIDES 17.2 MG: 8.6 TABLET, FILM COATED ORAL at 12:04

## 2025-05-10 RX ADMIN — HEPARIN SODIUM 5000 UNITS: 5000 INJECTION INTRAVENOUS; SUBCUTANEOUS at 06:14

## 2025-05-10 RX ADMIN — DOCUSATE SODIUM 100 MG: 100 CAPSULE, LIQUID FILLED ORAL at 09:42

## 2025-05-10 RX ADMIN — INSULIN LISPRO 4 UNITS: 100 INJECTION, SOLUTION INTRAVENOUS; SUBCUTANEOUS at 12:03

## 2025-05-10 RX ADMIN — DIPHENHYDRAMINE HYDROCHLORIDE 25 MG: 50 INJECTION, SOLUTION INTRAMUSCULAR; INTRAVENOUS at 12:16

## 2025-05-10 RX ADMIN — QUETIAPINE 50 MG: 25 TABLET ORAL at 20:38

## 2025-05-10 RX ADMIN — CHLORHEXIDINE GLUCONATE 15 ML: 1.2 SOLUTION ORAL at 09:41

## 2025-05-10 RX ADMIN — HEPARIN SODIUM 5000 UNITS: 5000 INJECTION INTRAVENOUS; SUBCUTANEOUS at 20:37

## 2025-05-10 RX ADMIN — CHLORHEXIDINE GLUCONATE 15 ML: 1.2 SOLUTION ORAL at 20:38

## 2025-05-10 RX ADMIN — ALLOPURINOL 300 MG: 300 TABLET ORAL at 09:42

## 2025-05-10 NOTE — ASSESSMENT & PLAN NOTE
Patient noted with transaminitis, LFTs has been fluctuating, more recently this morning noted , .  Could be due to recent chemotherapy versus antibiotics versus liver lesions.  Repeat CMP showed improving AST now within normal limit, ALT trended down from 322 to 187.

## 2025-05-10 NOTE — QUICK NOTE
PMR Quick Note    Chart reviewed. No major events reported or use of Prn olanzapine. Continue 1:1 at this time. And continue plan of care as per primary team.     Ashley de Padua, MD  Physical Medicine and Rehabilitation

## 2025-05-10 NOTE — ASSESSMENT & PLAN NOTE
Monitor status post methotrexate  Previous baseline as OP 5/2023-2/22/25 was 1.1 to 1.0  CMP 5/9/25 with creatinine of 1.18 down from 1.23 on 5/8 and on 5/10 was 1.15  For CMP 5/12/25
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
## 2025-05-10 NOTE — PROGRESS NOTES
"Progress Note - Hospitalist   Name: Alexis Dennison 64 y.o. male I MRN: [id number]  Unit/Bed#: -01 I Date of Admission: 5/7/2025   Date of Service: 5/11/2025 I Hospital Day: 4    Assessment & Plan  Primary central nervous system (CNS) lymphoma  Patient with development of worsening confusion, and was seen in the ED on 3/24/2025.  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  Lymphoma/leukemia panel was nondiagnostic  CTH 4/3/25: concerning for worsening hydrocephalus.   Repeat LP 4/7/25 = undiagnostic again   MRI brain 4/11/25: \"Interval enlargement of the dominant left anterior basal ganglionic mass lesion with greater surrounding vasogenic edema and mass effect. 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  S/p EVD, self removed 4/26  S/p Keppra 500mg BID x 7 days for postoperative seizure ppx   Started on chemotherapy during hospital stay and is for weekly Rituximab and Methotrexate every 2 weeks  Daily serum methotrexate levels until level is less than 0.1  Per heme-onc monitoring urine pH and Methotrexate levels do not need further monitoring at this time   Continue Dexamethasone taper = LD will be 5/19/25  Maintain PICC line  H-O following  Had Rituxan 5/10/25  Type 2 diabetes mellitus with hyperglycemia, without long-term current use of insulin (HCC)  Hemoglobin A1C was 6.6 on 2/22/25  Sees End[facility] in [place]  Home:  Janumet XR  qd  Here: Lantus " "10 U qhs/Lispro 4 U TID/Metformin 1000 mg qd  On DM diet  Insulin wasn't here 5/8 AM so didn't get Lispro with breakfast which explains why lunch BS was elevated.   Metformin was ordered on transfer to be restarted 5/8/25 AM  5/8/25:  BS at dinnertime 195, evening of 5/7/25 was 250.  Since Metformin was just started 5/8/25 AM, reduced Lispro to 3U TID from 8U   On 5/10/25, increased Lispro WM to 4U TID   BSs should continue to improve with steroid being tapered  No other changes for today 5/11/25  Mixed hyperlipidemia  Continue atorvastatin  Thyroid nodule  TSH/free T4 4/21/25 = 0.019/1.13  Nonemergent outpatient endocrine follow-up.   Will require outpatient TSH, free T4, +/- further imaging with endocrine such as radioactive uptake   Pulmonary nodule  CT chest 3 months follow-up  Liver lesion  Patient will require outpatient follow-up  Encephalopathy  Improving  Continue Seroquel  Continue steroid taper  On 1:1  LETY (acute kidney injury) (HCC)  Monitor status post methotrexate  Previous baseline as OP 5/2023-2/22/25 was 1.1 to 1.0  CMP 5/9/25 with creatinine of 1.18 down from 1.23 on 5/8 and on 5/10 was 1.15  For CMP 5/12/25  Sepsis (HCC)  Resolved  Continue to monitor off antibiotics  E coli bacteremia  Patient completed 7 days of IV cefazolin which was finished on May 5  Was due to E. Coli UTI  HTN (hypertension)  Home:  Losartan -25 qd  Here:  no meds for now since he is normotensive  Transaminitis  AST is 111, previously 114,  ALT is 322 previously 149  D/w H-O, they are aware =  \"Could be due to recent chemotherapy versus antibiotics versus liver lesions\".    CMP 5/9/25 shows AST 57 (previously 111),  (previously 322)  If trends up, will need repeat imaging  Leukopenia  WBC is stable at 3.3 on 5/9/25  H-O following  Hyponatremia  Mild at 132  Will watch  CMP 5/12/25    The above assessment and plan was reviewed and updated as determined by my evaluation of the patient on 5/11/2025.    History of " Present Illness   Patient seen and examined. Patients overnight issues or events were reviewed with nursing staff. New or overnight issues include the following:    No new or overnight issues.  Offers no complaints    Review of Systems   All other systems reviewed and are negative.      Objective :  Temp:  [97.1 °F (36.2 °C)-97.4 °F (36.3 °C)] 97.4 °F (36.3 °C)  HR:  [59-80] 80  BP: (116-138)/(65-77) 138/70  Resp:  [14-20] 20  SpO2:  [97 %-98 %] 97 %  O2 Device: None (Room air)    Invasive Devices       Peripherally Inserted Central Catheter Line  Duration             PICC Line 05/01/25 Left Brachial 9 days              Drain  Duration             External Urinary Catheter 8 days                    Physical Exam  General Appearance: no distress, non toxic appearing  HEENT: PERRLA, conjuctiva normal; oropharynx clear; mucous membranes moist.  Scalp incision scabbed and healing well w/o drainage or erythema   Neck:  Supple, normal ROM  Lungs: CTA, normal respiratory effort, no retractions, expiratory effort normal  CV: regular rate and rhythm; no rubs/murmurs/gallops, PMI normal   ABD: soft; ND/NT; +BS  EXT: no edema  Skin: normal turgor, normal texture  Psych: affect normal, mood normal  Neuro: AA        The above physical exam was reviewed and updated as determined by my evaluation of the patient on 5/11/2025.      Lab Results: I have reviewed the following results:  Results from last 7 days   Lab Units 05/09/25  0512 05/08/25  0552   WBC Thousand/uL 3.36* 2.63*   HEMOGLOBIN g/dL 8.8* 8.6*   HEMATOCRIT % 24.7* 24.2*   PLATELETS Thousands/uL 160 195     Results from last 7 days   Lab Units 05/10/25  1023 05/09/25  0512   SODIUM mmol/L 132* 138   POTASSIUM mmol/L 4.4 4.5   CHLORIDE mmol/L 98 101   CO2 mmol/L 25 33*   BUN mg/dL 34* 35*   CREATININE mg/dL 1.15 1.18   CALCIUM mg/dL 9.1 9.0             Results from last 7 days   Lab Units 05/11/25  0647 05/10/25  2043 05/10/25  1620   POC GLUCOSE mg/dl 141* 156* 207*        Imaging Results Review: No pertinent imaging studies reviewed.  Other Study Results Review: No additional pertinent studies reviewed.    Review of Scheduled Meds: Medications  reviewed and reconciled as needed  Current Facility-Administered Medications   Medication Dose Route Frequency Provider Last Rate    acetaminophen  650 mg Oral Q6H PRN Crispin Arana MD      allopurinol  300 mg Oral Daily Crispin Arana MD      alteplase  2 mg Intracatheter Q1MIN PRN Crispin Arana MD      alteplase  2 mg Intracatheter Q1MIN PRN Magali Lee MD      aluminum-magnesium hydroxide-simethicone  30 mL Oral Q4H PRN Crispin Arana MD      atorvastatin  20 mg Oral Daily Crispin Arana MD      bisacodyl  10 mg Rectal Daily PRN Jessica Arreola, DO      calcium carbonate  1,000 mg Oral Daily PRN Crispin Arana MD      chlorhexidine  15 mL Mouth/Throat Q12H [facility] Crispin Arana MD      dexamethasone  2 mg Oral Q12H [facility] Crispin Arana MD      Followed by    [START ON 5/15/2025] dexamethasone  1 mg Oral Q12H [facility] Crispin Arana MD      docusate sodium  100 mg Oral BID Jessica Arreola, DO      heparin (porcine)  5,000 Units Subcutaneous Q8H CAIT Crispin Arana MD      insulin glargine  10 Units Subcutaneous HS Crispin Arana MD      insulin lispro  2-12 Units Subcutaneous 4x Daily (AC & HS) Crispin Arana MD      insulin lispro  4 Units Subcutaneous TID With Meals PATI Porras      melatonin  6 mg Oral HS Crispin Arana MD      metFORMIN  1,000 mg Oral Daily Crispin Arana MD      OLANZapine  5 mg Intramuscular BID PRN Crispin Arana MD      ondansetron  4 mg Intravenous Q6H PRN Crispin Arana MD      oxyCODONE  2.5 mg Oral Q4H PRN Crispin Arana MD      Or    oxyCODONE  5 mg Oral Q4H PRN Crispin Arana MD      pantoprazole  40 mg Oral Early Morning Crispin Arana MD      polyethylene glycol  17 g Oral Daily PRN Jessica Arreola, DO      QUEtiapine  12.5 mg Oral Daily Crispin Arana MD      QUEtiapine  50 mg  Oral HS Crispin Arana MD      senna  2 tablet Oral Daily With Lunch Jessica Arreola DO      sodium chloride  20 mL/hr Intravenous Once PRN Crispin Arana MD      sodium chloride  20 mL/hr Intravenous Once PRN Magali Lee MD         VTE Pharmacologic Prophylaxis: HSQ  Code Status: Level 1 - Full Code  Current Length of Stay: 4 day(s)    Administrative Statements     ** Please Note:  voice to text software may have been used in the creation of this document. Although proof errors in transcription or interpretation are a potential of such software**

## 2025-05-10 NOTE — PROGRESS NOTES
"OT daily tx note     05/10/25 1100   Pain Assessment   Pain Assessment Tool 0-10   Pain Score No Pain   Restrictions/Precautions   Precautions 1:1;Bed/chair alarms;Cognitive;Fall Risk;Multiple lines;Supervision on toilet/commode;Impulsive   Weight Bearing Restrictions No   ROM Restrictions No   Lifestyle   Autonomy \"I really couldn't tell you much of anything\"   Sit to Lying   Type of Assistance Needed Supervision   Physical Assistance Level No physical assistance   Sit to Lying CARE Score 4   Lying to Sitting on Side of Bed   Type of Assistance Needed Supervision   Physical Assistance Level No physical assistance   Lying to Sitting on Side of Bed CARE Score 4   Sit to Stand   Type of Assistance Needed Incidental touching   Physical Assistance Level No physical assistance   Comment CG w/ gait belt and no AD   Sit to Stand CARE Score 4   Bed-Chair Transfer   Type of Assistance Needed Physical assistance   Physical Assistance Level 25% or less   Comment min A-CGA w/o AD and gait belt   Chair/Bed-to-Chair Transfer CARE Score 3   Cognition   Overall Cognitive Status Impaired   Arousal/Participation Alert;Cooperative   Attention Attends with cues to redirect   Orientation Level Oriented to person;Disoriented to place;Disoriented to time;Disoriented to situation   Memory Decreased short term memory;Decreased recall of precautions   Following Commands Follows one step commands with increased time or repetition   Comments Pt engaged in functional cognition activity using orientation calendar. Pt only oriented to self this session similar to that of yesterday. Pt cont to report date as July or June 3 and believes year is 2011. OT provided calendar w/ month of May dates as well as other prevalent info. OT edu on use of calendar as a way to orient himself when feeling restless and /or confused as to where he is. OT edu pt can use by crossing out dates to keep track of current day and writing specific events that happen each " day such as therapy schedule. Despite extensive time taken to edu pt, poor carryover and will need to cont w/ cog retraining/rep task training w/ cog tasks. Pt benefits from activity to prevent feelings of confusion and facilitate grounding to current situation to improve quality of life w/ current dx.   Additional Activities   Additional Activities Other (Comment)   Additional Activities Comments Pt participated in community mobility task ambulating t/o unit to simulate community/household distances. Gait belt donned and no AD used to reflect prior level of ambulation. Min A-CGA req and min VC needed to prevent reaching for hallway handrails which was better compared to yesterday. Pt reports feeling stronger and more stable w/ walking and prefers to not use RW in future. Poor righting reactions when making turns and needs min A to correct. Pt benefits from task to improve endurance, attention, activity tolerance, and balance to dec CG burden following DC.   Activity Tolerance   Activity Tolerance Patient tolerated treatment well   Assessment   Treatment Assessment Pt engaged in skilled OT session with focus on Functional Transfers, Functional Cognition, Functional Attention, Energy conservation training/education, healthy coping education, Leisure and social pursuits, and community re-integration. Pt is limited by weakness, impaired balance, decreased endurance, increased fall risk, decreased ADLS, decreased IADLS, decreased activity tolerance, decreased safety awareness, impaired judgement, decreased cognition, and decreased strength. Brief session completed w/ pt and chemo IV started at end of session. Pt, SO Naldo was present and leaving at start of session. OT initiating convo about DC plan but Naldo not fully understanding and reports Elizabeth ordoñez will take care of DC and will be in later in the day. Session focused on functional cog and endurance training and upcoming sessions to focus on cog, ADL retraining,  endurance retraining, standing balance/delfin, and functional transfers. OT services are warranted to address above barriers.   Prognosis Good   Problem List Decreased strength;Decreased range of motion;Decreased endurance;Impaired balance;Decreased mobility;Decreased cognition;Impaired judgement;Decreased safety awareness   Barriers to Discharge Inaccessible home environment;Decreased caregiver support   Plan   Treatment/Interventions ADL retraining;Functional transfer training;Therapeutic exercise;Endurance training   Progress Progressing toward goals   OT Therapy Minutes   OT Time In 1100   OT Time Out 1145   OT Total Time (minutes) 45   OT Mode of treatment - Individual (minutes) 45   OT Mode of treatment - Concurrent (minutes) 0   OT Mode of treatment - Group (minutes) 0   OT Mode of treatment - Co-treat (minutes) 0   OT Mode of Treatment - Total time(minutes) 45 minutes   OT Cumulative Minutes 195   Therapy Time missed   Time missed? Yes   Amount of time missed 15   Reason for time missed Medical procedure  (chemo IV)

## 2025-05-10 NOTE — ASSESSMENT & PLAN NOTE
64 yoM with PMHx of DM2, NAFLD, and HT who presented with progressive encephalopathy found to have multiple scattered nodular cerebral and cerebellar enhancement who underwent two non-diagnostic LPs prompting stereotactic brain biopsy (4/14) which showed large B-cell lymphoma. See oncology progress note on 4/21 for full diagnostic work up. On 4/29, agitation led to infectious work up with showed UA+ and BCx with 2/2 E. Coli sensitive to ceftriaxone. ID was consulted is managing and has approved to restart chemo 5/2.      Treatment Plan: regimen modified from (https://pubmed.ncbi.nlm.nih.gov/21155052/)     High-dose methotrexate every 2 weeks x4 cycles followed by every 4 weeks maintenance (could consider dosing every 4 weeks if he discharges to outside rehab)  C1 (4/17) 8 g/m²  C2 (5/2) 3 g/m², dose-reduced due to LETY, delayed x1 day due to E. coli bacteremia   Urine pH remained above 7.0.  Methotrexate level 24 hours post high-dose methotrexate, from 5/3/2025 was less than 0.10. Repeat levels on 5/5/25 at 0.36. Leucovorin, bicarb gtt was restarted.  Methotrexate level from this morning less than 0.10, no need to further monitor methotrexate level or urine pH.  Patient is due for Rituxan today, patient examined, labs from yesterday reviewed, okay to proceed with Rituxan.

## 2025-05-10 NOTE — PROGRESS NOTES
Progress Note - Oncology-Medical   Name: Alexis Dennison 64 y.o. male I MRN: [id number]  Unit/Bed#: -01 I Date of Admission: 5/7/2025   Date of Service: 5/10/2025 I Hospital Day: 3     Assessment & Plan  Primary central nervous system (CNS) lymphoma  64 yoM with PMHx of DM2, NAFLD, and HT who presented with progressive encephalopathy found to have multiple scattered nodular cerebral and cerebellar enhancement who underwent two non-diagnostic LPs prompting stereotactic brain biopsy (4/14) which showed large B-cell lymphoma. See oncology progress note on 4/21 for full diagnostic work up. On 4/29, agitation led to infectious work up with showed UA+ and BCx with 2/2 E. Coli sensitive to ceftriaxone. ID was consulted is managing and has approved to restart chemo 5/2.      Treatment Plan: regimen modified from (https://pubmed.ncbi.nlm.nih.gov/76539890/)     High-dose methotrexate every 2 weeks x4 cycles followed by every 4 weeks maintenance (could consider dosing every 4 weeks if he discharges to outside rehab)  C1 (4/17) 8 g/m²  C2 (5/2) 3 g/m², dose-reduced due to LETY, delayed x1 day due to E. coli bacteremia   Urine pH remained above 7.0.  Methotrexate level 24 hours post high-dose methotrexate, from 5/3/2025 was less than 0.10. Repeat levels on 5/5/25 at 0.36. Leucovorin, bicarb gtt was restarted.  Methotrexate level from this morning less than 0.10, no need to further monitor methotrexate level or urine pH.  Patient is due for Rituxan today, patient examined, labs from yesterday reviewed, okay to proceed with Rituxan.  Thyroid nodule  Patient incidentally found to have thyroid nodule, was recommended ultrasound thyroid.  Pulmonary nodule  CTA from 3/29/2025 with nonspecific 4 mm right lower lobe pulmonary nodule, recommended 3 months follow-up.  Transaminitis  Patient noted with transaminitis, LFTs has been fluctuating, more recently this morning noted , .  Could be due to recent chemotherapy versus  antibiotics versus liver lesions.  Repeat CMP showed improving AST now within normal limit, ALT trended down from 322 to 187.      Outpatient follow up plan: To be decided    Communication with patient/family:  I did update the patient.    Communication with team:  Did communicate with  primary team.     I did review this patient with Dr. Mancilla and they are in agreement with this plan.          Subjective:  Patient seen at bedside, no overnight events reported.    Review of Systems   Constitutional:  Negative for appetite change, fatigue and unexpected weight change.   Respiratory:  Negative for chest tightness and shortness of breath.    Cardiovascular:  Negative for chest pain and palpitations.   Gastrointestinal:  Negative for abdominal pain and constipation.   Musculoskeletal:  Negative for arthralgias and myalgias.   Neurological:  Negative for dizziness and headaches.   All other systems reviewed and are negative.         Objective:     Medication Administration - last 24 hours from 05/09/2025 0858 to 05/10/2025 0858         Date/Time Order Dose Route Action Action by     05/09/2025 0940 EDT atorvastatin (LIPITOR) tablet 20 mg 20 mg Oral Given Maryuri Jacobs RN     05/09/2025 2022 EDT chlorhexidine (PERIDEX) 0.12 % oral rinse 15 mL 15 mL Mouth/Throat Given Maryuri Jacobs RN     05/09/2025 0940 EDT chlorhexidine (PERIDEX) 0.12 % oral rinse 15 mL 15 mL Mouth/Throat Given Maryuri Jacobs RN     05/10/2025 0614 EDT pantoprazole (PROTONIX) EC tablet 40 mg 40 mg Oral Given Rabia Kapoor RN     05/09/2025 0940 EDT allopurinol (ZYLOPRIM) tablet 300 mg 300 mg Oral Given Maryuri Jacobs RN     05/10/2025 0614 EDT heparin (porcine) subcutaneous injection 5,000 Units 5,000 Units Subcutaneous Given Rabia Kapoor RN     05/09/2025 2207 EDT heparin (porcine) subcutaneous injection 5,000 Units 5,000 Units Subcutaneous Given Rabia Kapoor RN     05/09/2025 1455 EDT heparin (porcine) subcutaneous injection 5,000 Units  "5,000 Units Subcutaneous Given Maryuri Jacobs RN     05/09/2025 2212 EDT melatonin tablet 6 mg 6 mg Oral Given Rabia Kapoor RN     05/09/2025 2214 EDT insulin glargine (LANTUS) subcutaneous injection 10 Units 0.1 mL 10 Units Subcutaneous Given Rabia Kapoor RN     05/09/2025 1138 EDT QUEtiapine (SEROquel) tablet 12.5 mg 12.5 mg Oral Given Maryuri Jacobs RN     05/09/2025 2207 EDT QUEtiapine (SEROquel) tablet 50 mg 50 mg Oral Given Rabia Kapoor RN     05/09/2025 2213 EDT insulin lispro (HumALOG/ADMELOG) 100 units/mL subcutaneous injection 2-12 Units 2 Units Subcutaneous Given Rabia Kapoor RN     05/09/2025 1628 EDT insulin lispro (HumALOG/ADMELOG) 100 units/mL subcutaneous injection 2-12 Units 2 Units Subcutaneous Given Maryuri Jacobs RN     05/09/2025 1129 EDT insulin lispro (HumALOG/ADMELOG) 100 units/mL subcutaneous injection 2-12 Units 4 Units Subcutaneous Given Maryuri Jacobs RN     05/09/2025 2022 EDT dexamethasone (DECADRON) tablet 4 mg 4 mg Oral Given Maryuri Jacobs RN     05/09/2025 0940 EDT dexamethasone (DECADRON) tablet 4 mg 4 mg Oral Given Maryuri Jacobs RN     05/09/2025 1836 EDT docusate sodium (COLACE) capsule 100 mg 100 mg Oral Given Maryuri Jacobs RN     05/09/2025 0940 EDT docusate sodium (COLACE) capsule 100 mg 100 mg Oral Given Maryuri Jacobs RN     05/09/2025 1134 EDT senna (SENOKOT) tablet 17.2 mg 17.2 mg Oral Given Maryuri Jacobs RN     05/09/2025 1628 EDT insulin lispro (HumALOG/ADMELOG) 100 units/mL subcutaneous injection 3 Units 3 Units Subcutaneous Given Maryuri Jacobs RN     05/09/2025 1129 EDT insulin lispro (HumALOG/ADMELOG) 100 units/mL subcutaneous injection 3 Units 3 Units Subcutaneous Given Maryuri Jacobs RN            /63 (BP Location: Right arm)   Pulse 60   Temp 98.8 °F (37.1 °C) (Oral)   Resp 16   Ht 5' 10\" (1.778 m)   Wt 74.8 kg (164 lb 14.5 oz)   SpO2 97%   BMI 23.66 kg/m²       Physical Exam  Constitutional:       Appearance: Normal appearance.   HENT:    "   Head: Normocephalic and atraumatic.      Mouth/Throat:      Mouth: Mucous membranes are moist.      Pharynx: Oropharynx is clear.   Cardiovascular:      Rate and Rhythm: Normal rate and regular rhythm.      Pulses: Normal pulses.      Heart sounds: Normal heart sounds.   Abdominal:      General: Abdomen is flat. Bowel sounds are normal.   Musculoskeletal:         General: Normal range of motion.   Skin:     General: Skin is warm and dry.   Neurological:      General: No focal deficit present.      Mental Status: He is alert.         Recent Results (from the past 48 hours)   Fingerstick Glucose (POCT)    Collection Time: 05/08/25  9:34 AM   Result Value Ref Range    POC Glucose 280 (H) 65 - 140 mg/dl   Fingerstick Glucose (POCT)    Collection Time: 05/08/25 10:56 AM   Result Value Ref Range    POC Glucose 261 (H) 65 - 140 mg/dl   Fingerstick Glucose (POCT)    Collection Time: 05/08/25  3:48 PM   Result Value Ref Range    POC Glucose 195 (H) 65 - 140 mg/dl   Fingerstick Glucose (POCT)    Collection Time: 05/08/25  8:44 PM   Result Value Ref Range    POC Glucose 133 65 - 140 mg/dl   Comprehensive metabolic panel    Collection Time: 05/09/25  5:12 AM   Result Value Ref Range    Sodium 138 135 - 147 mmol/L    Potassium 4.5 3.5 - 5.3 mmol/L    Chloride 101 96 - 108 mmol/L    CO2 33 (H) 21 - 32 mmol/L    ANION GAP 4 4 - 13 mmol/L    BUN 35 (H) 5 - 25 mg/dL    Creatinine 1.18 0.60 - 1.30 mg/dL    Glucose 128 65 - 140 mg/dL    Glucose, Fasting 128 (H) 65 - 99 mg/dL    Calcium 9.0 8.4 - 10.2 mg/dL    Corrected Calcium 9.6 8.3 - 10.1 mg/dL    AST 57 (H) 13 - 39 U/L     (H) 7 - 52 U/L    Alkaline Phosphatase 56 34 - 104 U/L    Total Protein 5.1 (L) 6.4 - 8.4 g/dL    Albumin 3.2 (L) 3.5 - 5.0 g/dL    Total Bilirubin 0.80 0.20 - 1.00 mg/dL    eGFR 64 ml/min/1.73sq m   CBC and differential    Collection Time: 05/09/25  5:12 AM   Result Value Ref Range    WBC 3.36 (L) 4.31 - 10.16 Thousand/uL    RBC 2.87 (L) 3.88 - 5.62  Million/uL    Hemoglobin 8.8 (L) 12.0 - 17.0 g/dL    Hematocrit 24.7 (L) 36.5 - 49.3 %    MCV 86 82 - 98 fL    MCH 30.7 26.8 - 34.3 pg    MCHC 35.6 31.4 - 37.4 g/dL    RDW 11.3 (L) 11.6 - 15.1 %    MPV 9.3 8.9 - 12.7 fL    Platelets 160 149 - 390 Thousands/uL    nRBC 0 /100 WBCs    Segmented % 65 43 - 75 %    Immature Grans % 1 0 - 2 %    Lymphocytes % 31 14 - 44 %    Monocytes % 2 (L) 4 - 12 %    Eosinophils Relative 1 0 - 6 %    Basophils Relative 0 0 - 1 %    Absolute Neutrophils 2.20 1.85 - 7.62 Thousands/µL    Absolute Immature Grans 0.02 0.00 - 0.20 Thousand/uL    Absolute Lymphocytes 1.05 0.60 - 4.47 Thousands/µL    Absolute Monocytes 0.07 (L) 0.17 - 1.22 Thousand/µL    Eosinophils Absolute 0.02 0.00 - 0.61 Thousand/µL    Basophils Absolute 0.00 0.00 - 0.10 Thousands/µL   Fingerstick Glucose (POCT)    Collection Time: 05/09/25  6:06 AM   Result Value Ref Range    POC Glucose 134 65 - 140 mg/dl   Fingerstick Glucose (POCT)    Collection Time: 05/09/25 10:31 AM   Result Value Ref Range    POC Glucose 202 (H) 65 - 140 mg/dl   Fingerstick Glucose (POCT)    Collection Time: 05/09/25  3:37 PM   Result Value Ref Range    POC Glucose 172 (H) 65 - 140 mg/dl   Fingerstick Glucose (POCT)    Collection Time: 05/09/25  8:59 PM   Result Value Ref Range    POC Glucose 169 (H) 65 - 140 mg/dl   Fingerstick Glucose (POCT)    Collection Time: 05/10/25  6:15 AM   Result Value Ref Range    POC Glucose 149 (H) 65 - 140 mg/dl       CT abdomen pelvis wo contrast  Result Date: 4/30/2025  Narrative: CT ABDOMEN AND PELVIS WITHOUT IV CONTRAST INDICATION: Gram-negative bacteremia, evaluate for source infection. COMPARISON: None. TECHNIQUE: CT examination of the abdomen and pelvis was performed without intravenous contrast. Multiplanar 2D reformatted images were created from the source data. This examination, like all CT scans performed in the [facility], was performed utilizing techniques to minimize radiation dose  exposure, including the use of iterative reconstruction and automated exposure control. Radiation dose length product (DLP) for this visit: 560.94 mGy-cm. Enteric Contrast: Not administered. FINDINGS: ABDOMEN LOWER CHEST: Calcified granuloma in the right middle lobe. LIVER/BILIARY TREE: 1 cm cyst in the right lobe. Liver is otherwise unremarkable.1 GALLBLADDER: No calcified gallstones. No pericholecystic inflammatory change. SPLEEN: Unremarkable. PANCREAS: Unremarkable. ADRENAL GLANDS: Unremarkable. KIDNEYS/URETERS: Unremarkable. No hydronephrosis. STOMACH AND BOWEL: Large amount of formed stool throughout the entire colon. No evidence of obstruction. APPENDIX: Normal. ABDOMINOPELVIC CAVITY: No ascites. No pneumoperitoneum. No lymphadenopathy. VESSELS: Unremarkable for patient's age. PELVIS REPRODUCTIVE ORGANS: Unremarkable for patient's age. URINARY BLADDER: Unremarkable. ABDOMINAL WALL/INGUINAL REGIONS: Mild infiltration of the anterior abdominal wall in the left lower quadrant, possibly a soft tissue contusion. BONES: No acute fracture or suspicious osseous lesion.     Impression: No acute inflammatory process in the abdomen or pelvis. Large amount of stool throughout the colon. Workstation performed: [id number]     XR abdomen obstruction series  Result Date: 4/30/2025  Narrative: XR ABDOMEN OBSTRUCTION SERIES INDICATION: Constipation, lethargy, gram-negative bacteremia. Confusion, newly diagnosed intracranial mass. Constipation. COMPARISON: MRI abdomen 3/30/2025 and CT chest abdomen pelvis 3/29/2025 FINDINGS: Large volume of colonic stool. Otherwise unremarkable bowel gas pattern. No pneumoperitoneum or abnormal air-fluid levels. No pathologic calcification or soft tissue mass. Bones are unremarkable for patient's age. Examination of the chest reveals clear lungs and a normal cardiomediastinal silhouette. Right PICC with tip projecting over the superior cavoatrial junction.     Impression: 1.  Large colonic stool  burden without obstruction or other acute abdominal findings. 2.  No acute cardiopulmonary findings. Resident: CINTHYA FAJARDO I, the attending radiologist, have reviewed the images and agree with the final report above. Workstation performed: [id number]     XR chest portable ICU  Result Date: 4/21/2025  Narrative: XR CHEST PORTABLE ICU INDICATION: ETT placement. COMPARISON: CXR 4/13/2025, chest CT 3/29/2025. FINDINGS: ET tube 3 cm above the arnav. Right PICC in upper right atrium. Clear lungs. No pneumothorax or pleural effusion. Normal cardiomediastinal silhouette. Bones are unremarkable for age. Normal upper abdomen.     Impression: No acute cardiopulmonary disease. ET tube 3 cm above the arnav. Workstation performed: [id number]     CT head wo contrast  Result Date: 4/21/2025  Narrative: CT BRAIN - WITHOUT CONTRAST INDICATION:   s/p EVD removal follow up hydrocephalus. COMPARISON: 4/20/2025 TECHNIQUE:  CT examination of the brain was performed.  Multiplanar 2D reformatted images were created from the source data. Radiation dose length product (DLP) for this visit:  1099 mGy-cm .  This examination, like all CT scans performed in the [facility], was performed utilizing techniques to minimize radiation dose exposure, including the use of iterative reconstruction and automated exposure control. IMAGE QUALITY:  Diagnostic. FINDINGS: PARENCHYMA: Ill-defined mixed density in the left gangliocapsular region corresponds to the lesion seen on prior MRI. Again noted is linear low-attenuation and trace blood products along the ventricular catheter tract, unchanged. VENTRICLES AND EXTRA-AXIAL SPACES: There is increasing ventricular caliber in the interim which is most pronounced involving the left lateral ventricle. There is no evidence of an acute, decompensated hydrocephalus. Small volume blood products in the occipital horns are decreasing. There is peripheral high attenuation along ventricular system  which may correspond to subependymal spread of neoplasm. There is no hydrocephalus. 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 VISUALIZED ORBITS: Normal visualized orbits. PARANASAL SINUSES: Normal visualized paranasal sinuses. CALVARIUM AND EXTRACRANIAL SOFT TISSUES: Left frontal siomara hole     Impression: 1. Increasing ventricular caliber without evidence of an acute, decompensated hydrocephalus. Recommend continued close follow-up. 2. Improving intraventricular blood products. The study was marked in EPIC for immediate notification. Workstation performed: [id number]     MRI cervical spine w wo contrast  Result Date: 4/20/2025  Narrative: MRI CERVICAL, THORACIC AND LUMBAR SPINE WITH AND WITHOUT CONTRAST INDICATION: new b cell lymphoma. COMPARISON:  None. TECHNIQUE:  Multiplanar, multisequence imaging of the total spine was performed before and after gadolinium administration. . IV Contrast:  7 mL of Gadobutrol injection (SINGLE-DOSE) IMAGE QUALITY: Motion-degraded, which reduces diagnostic sensitivity. FINDINGS: ALIGNMENT: Normal alignment of the cervical spine. No acute fracture. Multilevel endplate centered marrow changes. Scattered areas of intrinsic T1/T2 hyperintensity in the vertebral bodies, most consistent with osseous venous malformations/hemangiomas. MARROW SIGNAL: As above. CORD: No cord signal abnormality that can be confirmed in 2 planes. PREVERTEBRAL AND PARASPINAL SOFT TISSUES: No acute abnormality. VISUALIZED POSTERIOR FOSSA: Please refer to concurrent brain MRI. CERVICAL DISC SPACES: Multilevel mild degenerative disc disease. C2-C3: Disc osteophyte complex. No significant neuroforaminal narrowing. Mild spinal canal stenosis. C3-C4: Disc osteophyte complex. Uncovertebral and facet arthropathy contribute to marked bilateral neuroforaminal narrowing, left greater than right. Moderate spinal canal  stenosis. C4-C5: Disc osteophyte complex. Uncovertebral and facet arthropathy contribute to mild-moderate bilateral neuroforaminal narrowing. Mild spinal canal stenosis. C5-C6: Disc osteophyte complex. Uncovertebral and facet arthropathy contribute to moderate-marked bilateral neuroforaminal narrowing. Moderate spinal canal stenosis. C6-C7: Disc osteophyte complex. Uncovertebral and facet arthropathy contribute to marked left with moderate right neuroforaminal narrowing. Mild-moderate spinal canal stenosis. C7-T1: No significant neuroforaminal narrowing or spinal canal stenosis. THORACIC DEGENERATIVE CHANGE: Mild spondylotic changes without high-grade neuroforaminal narrowing or spinal canal stenosis. LUMBAR DISC SPACES: Multilevel mild degenerative disc disease. L1-L2: No significant neuroforaminal narrowing or spinal canal stenosis. L2-L3: No significant neuroforaminal narrowing or spinal canal stenosis. L3-L4: Disc bulge. No significant neuroforaminal narrowing. Mild spinal canal stenosis. L4-L5: Disc bulge, noting abutment of the bilateral traversing L5 nerve roots. Mild bilateral neuroforaminal narrowing. Mild spinal canal stenosis. L5-S1: Disc bulge with superimposed central disc herniation, noting abutment of the right greater than left traversing S1 nerve roots and mild-moderate central spinal canal stenosis. Mild bilateral neuroforaminal narrowing. POSTCONTRAST IMAGING: No abnormal enhancement. OTHER FINDINGS: Retropharyngeal course of the right carotid artery. Multiple large left thyroid nodules measuring greater than 1.5 cm. Scattered T2 hyperintensities in the liver, statistically cyst versus hemangioma. Small amount of debris is noted within the tracheobronchial tree. Patchy atelectasis with small bilateral pleural effusions in the partially visualized lungs. Partially visualized enteric and endotracheal tubes.     Impression: 1.  No evidence of lymphomatous involvement of the spine. 2.  Multilevel  spondylotic changes, as detailed above. See narrative above for full details. 3.  Multiple large left thyroid nodules, for which further evaluation with thyroid sonogram is recommended if not previously performed. The study was marked in EPIC for immediate notification. Workstation performed: [id number]     MRI lumbar spine w wo contrast  Result Date: 4/20/2025  Narrative: MRI CERVICAL, THORACIC AND LUMBAR SPINE WITH AND WITHOUT CONTRAST INDICATION: new b cell lymphoma. COMPARISON:  None. TECHNIQUE:  Multiplanar, multisequence imaging of the total spine was performed before and after gadolinium administration. . IV Contrast:  7 mL of Gadobutrol injection (SINGLE-DOSE) IMAGE QUALITY: Motion-degraded, which reduces diagnostic sensitivity. FINDINGS: ALIGNMENT: Normal alignment of the cervical spine. No acute fracture. Multilevel endplate centered marrow changes. Scattered areas of intrinsic T1/T2 hyperintensity in the vertebral bodies, most consistent with osseous venous malformations/hemangiomas. MARROW SIGNAL: As above. CORD: No cord signal abnormality that can be confirmed in 2 planes. PREVERTEBRAL AND PARASPINAL SOFT TISSUES: No acute abnormality. VISUALIZED POSTERIOR FOSSA: Please refer to concurrent brain MRI. CERVICAL DISC SPACES: Multilevel mild degenerative disc disease. C2-C3: Disc osteophyte complex. No significant neuroforaminal narrowing. Mild spinal canal stenosis. C3-C4: Disc osteophyte complex. Uncovertebral and facet arthropathy contribute to marked bilateral neuroforaminal narrowing, left greater than right. Moderate spinal canal stenosis. C4-C5: Disc osteophyte complex. Uncovertebral and facet arthropathy contribute to mild-moderate bilateral neuroforaminal narrowing. Mild spinal canal stenosis. C5-C6: Disc osteophyte complex. Uncovertebral and facet arthropathy contribute to moderate-marked bilateral neuroforaminal narrowing. Moderate spinal canal stenosis. C6-C7: Disc osteophyte complex.  Uncovertebral and facet arthropathy contribute to marked left with moderate right neuroforaminal narrowing. Mild-moderate spinal canal stenosis. C7-T1: No significant neuroforaminal narrowing or spinal canal stenosis. THORACIC DEGENERATIVE CHANGE: Mild spondylotic changes without high-grade neuroforaminal narrowing or spinal canal stenosis. LUMBAR DISC SPACES: Multilevel mild degenerative disc disease. L1-L2: No significant neuroforaminal narrowing or spinal canal stenosis. L2-L3: No significant neuroforaminal narrowing or spinal canal stenosis. L3-L4: Disc bulge. No significant neuroforaminal narrowing. Mild spinal canal stenosis. L4-L5: Disc bulge, noting abutment of the bilateral traversing L5 nerve roots. Mild bilateral neuroforaminal narrowing. Mild spinal canal stenosis. L5-S1: Disc bulge with superimposed central disc herniation, noting abutment of the right greater than left traversing S1 nerve roots and mild-moderate central spinal canal stenosis. Mild bilateral neuroforaminal narrowing. POSTCONTRAST IMAGING: No abnormal enhancement. OTHER FINDINGS: Retropharyngeal course of the right carotid artery. Multiple large left thyroid nodules measuring greater than 1.5 cm. Scattered T2 hyperintensities in the liver, statistically cyst versus hemangioma. Small amount of debris is noted within the tracheobronchial tree. Patchy atelectasis with small bilateral pleural effusions in the partially visualized lungs. Partially visualized enteric and endotracheal tubes.     Impression: 1.  No evidence of lymphomatous involvement of the spine. 2.  Multilevel spondylotic changes, as detailed above. See narrative above for full details. 3.  Multiple large left thyroid nodules, for which further evaluation with thyroid sonogram is recommended if not previously performed. The study was marked in EPIC for immediate notification. Workstation performed: [id number]     MRI thoracic spine w wo contrast  Result Date:  4/20/2025  Narrative: MRI CERVICAL, THORACIC AND LUMBAR SPINE WITH AND WITHOUT CONTRAST INDICATION: new b cell lymphoma. COMPARISON:  None. TECHNIQUE:  Multiplanar, multisequence imaging of the total spine was performed before and after gadolinium administration. . IV Contrast:  7 mL of Gadobutrol injection (SINGLE-DOSE) IMAGE QUALITY: Motion-degraded, which reduces diagnostic sensitivity. FINDINGS: ALIGNMENT: Normal alignment of the cervical spine. No acute fracture. Multilevel endplate centered marrow changes. Scattered areas of intrinsic T1/T2 hyperintensity in the vertebral bodies, most consistent with osseous venous malformations/hemangiomas. MARROW SIGNAL: As above. CORD: No cord signal abnormality that can be confirmed in 2 planes. PREVERTEBRAL AND PARASPINAL SOFT TISSUES: No acute abnormality. VISUALIZED POSTERIOR FOSSA: Please refer to concurrent brain MRI. CERVICAL DISC SPACES: Multilevel mild degenerative disc disease. C2-C3: Disc osteophyte complex. No significant neuroforaminal narrowing. Mild spinal canal stenosis. C3-C4: Disc osteophyte complex. Uncovertebral and facet arthropathy contribute to marked bilateral neuroforaminal narrowing, left greater than right. Moderate spinal canal stenosis. C4-C5: Disc osteophyte complex. Uncovertebral and facet arthropathy contribute to mild-moderate bilateral neuroforaminal narrowing. Mild spinal canal stenosis. C5-C6: Disc osteophyte complex. Uncovertebral and facet arthropathy contribute to moderate-marked bilateral neuroforaminal narrowing. Moderate spinal canal stenosis. C6-C7: Disc osteophyte complex. Uncovertebral and facet arthropathy contribute to marked left with moderate right neuroforaminal narrowing. Mild-moderate spinal canal stenosis. C7-T1: No significant neuroforaminal narrowing or spinal canal stenosis. THORACIC DEGENERATIVE CHANGE: Mild spondylotic changes without high-grade neuroforaminal narrowing or spinal canal stenosis. LUMBAR DISC SPACES:  Multilevel mild degenerative disc disease. L1-L2: No significant neuroforaminal narrowing or spinal canal stenosis. L2-L3: No significant neuroforaminal narrowing or spinal canal stenosis. L3-L4: Disc bulge. No significant neuroforaminal narrowing. Mild spinal canal stenosis. L4-L5: Disc bulge, noting abutment of the bilateral traversing L5 nerve roots. Mild bilateral neuroforaminal narrowing. Mild spinal canal stenosis. L5-S1: Disc bulge with superimposed central disc herniation, noting abutment of the right greater than left traversing S1 nerve roots and mild-moderate central spinal canal stenosis. Mild bilateral neuroforaminal narrowing. POSTCONTRAST IMAGING: No abnormal enhancement. OTHER FINDINGS: Retropharyngeal course of the right carotid artery. Multiple large left thyroid nodules measuring greater than 1.5 cm. Scattered T2 hyperintensities in the liver, statistically cyst versus hemangioma. Small amount of debris is noted within the tracheobronchial tree. Patchy atelectasis with small bilateral pleural effusions in the partially visualized lungs. Partially visualized enteric and endotracheal tubes.     Impression: 1.  No evidence of lymphomatous involvement of the spine. 2.  Multilevel spondylotic changes, as detailed above. See narrative above for full details. 3.  Multiple large left thyroid nodules, for which further evaluation with thyroid sonogram is recommended if not previously performed. The study was marked in EPIC for immediate notification. Workstation performed: [id number]     MRI Brain BT w wo Contrast  Result Date: 4/20/2025  Narrative: MRI BRAIN WITH AND WITHOUT CONTRAST INDICATION: Please complete MRI brain w/wo BT protocol. COMPARISON: MRI brain 4/11/2025. TECHNIQUE: Multiplanar, multisequence imaging of the brain and sella was performed before and after gadolinium administration. IV Contrast:  7 mL of Gadobutrol injection IMAGE QUALITY:   Motion-degraded, which reduces diagnostic  sensitivity. FINDINGS: BRAIN PARENCHYMA: Redemonstrated mass centered in the left gangliocapsular region, noting slightly increased T2/FLAIR signal abnormality extending inferiorly compared to the prior study 4/11/2025. Decreased mass effect on the lateral ventricles compared to the prior study, noting there is a prior left frontal catheter tract with edema and hemosiderin deposition along the tract. No kerwin hydrocephalus; however, there are intraventricular blood products, new from the MRI 4/11/2025. Patchy nonspecific areas of T2/FLAIR signal abnormality in the left corona radiata/centrum semiovale, for example series 5, image 28) are also again noted. When compared to the prior MRI, there is decreased enhancement associated with the mass, noted to measure approximately 2.7 x 1.8 cm, previously 3.4 x 2.2 cm. Again seen are enhancing foci in the left posterior centrum semiovale measuring up to 4 mm, which could represent perivascular lesions. Redemonstrated leptomeningeal seeding and multiple areas of ependymal nodules and thickening. There is scattered leptomeningeal/ependymal enhancement about the frontal, temporal and posterior horns as well as the dependent aspect of the fourth ventricle.  There is also signal abnormality again noted along the left cerebral peduncle with faint associated enhancement. Transependymal flow of CSF is slightly decreased in the prior study There is hemosiderin deposition identified along the surface of the brainstem and upper cord. Perfusion: No definite elevated perfusion metrics. Diffusion: The above periventricular and intraventricular lesions demonstrate mild diffusion signal abnormality VENTRICLES: As above. SELLA AND PITUITARY GLAND: Posterior pituitary 6 mm T2 hyperintensity is identified on series 9, image 170. ORBITS: No acute abnormality. PARANASAL SINUSES: Trace mucosal thickening. VASCULATURE:  Evaluation of the major intracranial vasculature demonstrates appropriate  flow voids. CALVARIUM AND SKULL BASE: No acute abnormality. EXTRACRANIAL SOFT TISSUES: Fluid secretions are seen in the pharynx. Partially visualized oral route catheters.     Impression: Within the confines of a motion-degraded examination: 1.  Decreased enhancement and perfusion metrics associated with the left gangliocapsular mass (biopsy-proven lymphoma), noting decreased mass effect on the lateral ventricles and decreased transependymal flow of CSF. The change from MRI dated 4/11/2025 could be related to medical therapy. See narrative above for full discussion. 2.  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 3.  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 This could be further evaluated on nonemergent MRI pituitary protocol. The study was marked in EPIC for immediate notification. Workstation performed: [id number]     CT head wo contrast  Result Date: 4/20/2025  Narrative: CT BRAIN - WITHOUT CONTRAST INDICATION:   s/p EVD removal follow up hydrocephalus. COMPARISON: CT head 4/14/2025. TECHNIQUE:  CT examination of the brain was performed.  Multiplanar 2D reformatted images were created from the source data. Radiation dose length product (DLP) for this visit:  1308 mGy-cm .  This examination, like all CT scans performed in the [facility], was performed utilizing techniques to minimize radiation dose exposure, including the use of iterative reconstruction and automated exposure control. IMAGE QUALITY:  Diagnostic. FINDINGS: PARENCHYMA: Interval removal of left frontal approach ventriculostomy catheter, noting overall stable size and configuration of ventricular system, which again is noted to contain intraventricular blood products. There is hypoattenuation as well as blood  products along the prior catheter tract. Decreased pneumocephalus compared to the prior study. Redemonstrated  patchy hypoattenuation corresponding to T2/FLAIR signal abnormality involving the left gangliocapsular region extending into the left frontal lobe inferiorly, better assessed on concurrent brain MRI. VENTRICLES AND EXTRA-AXIAL SPACES: As above. VISUALIZED ORBITS: No acute abnormality. PARANASAL SINUSES: Trace mucosal thickening. CALVARIUM AND EXTRACRANIAL SOFT TISSUES: Partially visualized oral route catheters with adjacent secretions in the oropharynx.     Impression: 1.  Interval removal of left frontal approach ventriculostomy catheter, noting stable size and configuration of the ventricular system, which contains intraventricular blood products. Blood products also noted along the site of the prior catheter tract. 2.  Refer to concurrent brain MRI for characterization of hypoattenuation corresponding to T2/FLAIR signal abnormality involving the left gangliocapsular region extending into the left frontal lobe inferiorly. The study was marked in EPIC for immediate notification. Workstation performed: [id number]     MRI follow up neuro  Result Date: 4/18/2025  Narrative: MRI FOLLOW UP NEURO INDICATION: new b cell lymphoma. COMPARISON:  None. TECHNIQUE:  Multiplanar, multisequence imaging of the thoracic spine was scheduled to be performed. However, due to patient inability to tolerate the examination, it was prematurely terminated. Localizer series as well as a coronal T2 image were obtained. IMAGE QUALITY: Nondiagnostic. The obtained images are also motion degraded. FINDINGS: Hepatic, pulmonary and thyroid findings are better evaluated on dedicated CTA chest, abdomen and pelvis 3/29/2025.     Impression: Nondiagnostic examination. Recommend repeat examination, as clinically appropriate/tolerable. Workstation performed: [id number]     CT head wo contrast  Result Date: 4/14/2025  Narrative: CT BRAIN - WITHOUT CONTRAST INDICATION:   change in mental status/EVD not draining. COMPARISON: CT head 4/13/2025. TECHNIQUE:  CT  examination of the brain was performed.  Multiplanar 2D reformatted images were created from the source data. Radiation dose length product (DLP) for this visit:  1141 mGy-cm .  This examination, like all CT scans performed in the [facility], was performed utilizing techniques to minimize radiation dose exposure, including the use of iterative reconstruction and automated exposure control. IMAGE QUALITY:  Diagnostic. FINDINGS: PARENCHYMA: Stable hyperdense mass centered within the left basal ganglia with surrounding vasogenic edema. Left frontal lobe pneumocephalus and gas within the left frontal horn, increased since prior study. Interval resolution of the previously seen rightward bowing of the septum pellucidum. Stable mild rightward midline shift measuring 3 mm and partial effacement of the left aspect of the suprasellar cistern. There is no CT evidence for a large acute vascular distribution infarct. VENTRICLES AND EXTRA-AXIAL SPACES: Left frontal approach ventriculostomy catheter tip terminates near the foramen of Monro adjacent to the hyperdense mass. Small amount of layering intraventricular hemorrhage within the left occipital horn, slightly increased since prior study. There is improved hydrocephalus primarily of the left lateral ventricle since prior study. VISUALIZED ORBITS: Normal visualized orbits. PARANASAL SINUSES: Normal visualized paranasal sinuses. CALVARIUM AND EXTRACRANIAL SOFT TISSUES: Normal.     Impression: Left frontal approach ventriculostomy catheter terminating near the foramen of Monro with increased left frontal lobe pneumocephalus and gas within the left frontal horn since prior study. Slightly increased small amount of layering intraventricular hemorrhage within the left occipital horn. Improved hydrocephalus primarily of the left lateral ventricle since prior study. Stable hyperdense mass centered within the left basal ganglia with surrounding vasogenic edema and  mild rightward midline shift. The study was marked in EPIC for immediate notification. Workstation performed: [id number]     XR chest portable ICU  Result Date: 4/14/2025  Narrative: XR CHEST PORTABLE ICU INDICATION: intubation. COMPARISON: Chest radiograph 4/13/2019 FINDINGS: Endotracheal tube tip in the midthoracic trachea, approximately 4.5 cm above the arnav. External monitoring leads and clips project over the chest. Enteric catheter tip in the stomach. Clear lungs. No pneumothorax or pleural effusion. Normal cardiomediastinal silhouette. Bones are unremarkable for age. Normal upper abdomen.     Impression: No acute cardiopulmonary disease. Appropriately positioned endotracheal tube. Workstation performed: [id number]     CT head wo contrast  Result Date: 4/13/2025  Narrative: CT BRAIN - WITHOUT CONTRAST INDICATION:   EVD placement. COMPARISON: CT of the head from earlier the same day. TECHNIQUE:  CT examination of the brain was performed.  Multiplanar 2D reformatted images were created from the source data. Radiation dose length product (DLP) for this visit:  1195.54 mGy-cm .  This examination, like all CT scans performed in the [facility], was performed utilizing techniques to minimize radiation dose exposure, including the use of iterative reconstruction and automated exposure control. IMAGE QUALITY:  Diagnostic. FINDINGS: PARENCHYMA: The hyperdense mass centered within the left basal ganglia with surrounding vision edema is grossly stable. There is postprocedural pneumocephalus. There is also a small amount of gas within the left frontal horn. There is stable rightward bowing of the septum pellucidum. There is stable partial effacement of the left aspect of the suprasellar cistern. There is no CT evidence for a large acute vascular distribution infarct. VENTRICLES AND EXTRA-AXIAL SPACES:  The patient is status post interval placement of a left frontal approach ventriculostomy catheter.  The tip terminates adjacent to the previously seen stable hyperdense mass near the foramen of De Oliveira. There is a small amount of hemorrhage layering dependently within the left occipital horn. The overall degree of hydrocephalus is grossly stable since the prior exam. Correlate with prior contrast-enhanced MRI for multiple additional findings of some ependymal spread of disease. VISUALIZED ORBITS: Normal visualized orbits. PARANASAL SINUSES: Normal visualized paranasal sinuses. CALVARIUM AND EXTRACRANIAL SOFT TISSUES: Normal.     Impression: Status post placement of a left frontal approach ventriculostomy catheter with the tip terminating adjacent to the previously seen hyperdense mass near the foramen of De Oliveira. Small amount of intraventricular hemorrhage is noted within the left occipital horn with overall stable degree of hydrocephalus. Grossly stable hyperdense mass centered within the left basal ganglia. Correlate with prior contrast-enhanced MRI for additional findings. Workstation performed: [id number]     CT head wo contrast  Result Date: 4/13/2025  Narrative: CT BRAIN - WITHOUT CONTRAST INDICATION:   Altered mental status. Recently diagnosed brain tumor, altered mental status. COMPARISON: CT of the head 4/3/2025 and MRI of the brain 4/11/2025 TECHNIQUE:  CT examination of the brain was performed.  Multiplanar 2D reformatted images were created from the source data. Radiation dose length product (DLP) for this visit:  1155.94 mGy-cm .  This examination, like all CT scans performed in the [facility], was performed utilizing techniques to minimize radiation dose exposure, including the use of iterative reconstruction and automated exposure control. IMAGE QUALITY:  Diagnostic. FINDINGS: PARENCHYMA: Redemonstrated left anterior basal ganglia mass measuring approximately 3.6 x 3.0 x 1.6 cm (series 2 image 23 and series 303 image 41) with surrounding vasogenic edema, this is grossly stable from  MRI of the brain 4/11/2025 but appears more prominent when compared to CT of the head 4/3/2025 where it measured 2.5 x 2.6 x 1.1 cm. This results in regional mass effect and effacement of the left suprasellar cistern, similar to prior MRI. VENTRICLES AND EXTRA-AXIAL SPACES: Hyperdense mass located adjacent to the foramen of Monro measuring 1.4 x 1.3 cm, not significantly changed from CT of the head 4/3/2025 where it causes ipsilateral left lateral ventricle hydrocephalus, similar to 4/3/2025. Fourth ventricle is unremarkable in size. Again noted is small amount of intraventricular hemorrhage within the occipital horn of the left lateral ventricle (series 2 image 20), this appears grossly stable from 4/11/2025. Left hemispheric sulcal effacement secondary to mass  effect from left anterior basal ganglia mass mentioned above. VISUALIZED ORBITS: Normal visualized orbits. PARANASAL SINUSES: Polypoidal mucosal thickening of the left greater than right maxillary sinuses. CALVARIUM AND EXTRACRANIAL SOFT TISSUES: Normal.     Impression: Predominantly left sided obstructive hydrocephalus from lesion located adjacent to the foramen of Monro is not significantly changed from 4/3/2025. Blood products within the occipital horn of the left lateral ventricle is similar from MRI of the brain 4/11/2025. Left anterior basal ganglia mass with surrounding edema, regional mass effect, and effacement of the left suprasellar cistern, appears similar to MRI of the brain 4/11/2025. Please see that report for further characterization of total extent of tumor. Resident: CINTHYA FAJARDO I, the attending radiologist, have reviewed the images and agree with the final report above. Workstation performed: [id number]     MRI Brain BT w wo Contrast  Addendum Date: 4/11/2025  Addendum: ADDENDUM: I also verbally communicated these findings to the covering hospitalist, Dr. Laurence Restrepo at 7:20 p.m.    Result Date: 4/11/2025  Narrative: MRI BRAIN  WITH AND WITHOUT CONTRAST INDICATION: Follow-up of mass lesion, history of headaches. COMPARISON: MRI of the brain from March 26, 2025 TECHNIQUE: Multiplanar, multisequence imaging of the brain and sella was performed before and after gadolinium administration. Imaging performed on 1.5T MRI IV Contrast:  10 mL of Gadobutrol injection IMAGE QUALITY:   Diagnostic. FINDINGS: The previously noted left anterior basal ganglionic mass is larger and more coalescent, measuring approximately 3.5 x 3.1 x 1.6 cm in the transverse, AP and craniocaudad dimensions. There is greater surrounding vasogenic edema and mass effect. As previously noted, the lesion demonstrates leptomeningeal seeding and multiple areas of ependymal nodules and thickening. 1 of these nodules is located in the left foraminal [place] and results in ipsilateral obstructive hydrocephalus with mild transependymal flow of CSF. Intraventricular ependymal enhancement and nodularity is noted within the optic and infundibular recesses of the third ventricle. There is scattered leptomeningeal/ependymal enhancement about the bilateral frontal, temporal and posterior horns as well as the dependent aspect of the fourth ventricle. There is also signal abnormality within the ventral aspect of the left cerebral peduncle with faint enhancement which may represent an additional parenchymal versus leptomeningeal lesion. There are at least 2 left posterior centrum semiovale nodular lesion which measure 4 mm or less which may represent additional perivascular lesions. These are both slightly more conspicuous than noted on the prior study. Perfusion: ASL imaging demonstrates increased cerebral blood flow with the dominant lesion in the left anterior basal ganglia. Diffusion: The above-mentioned periventricular and intraventricular lesions demonstrate mild restricted diffusion. OTHER FINDINGS: A punctate focus of FLAIR hyperintense signal is now seen within the left lateral  frontal subcortical white matter (6:14). This focus does not demonstrate enhancement. VENTRICLES: See above comments regarding the obstruction at the level of the left foramina of De Oliveira and the moderate ipsilateral hydrocephalus. A small amount of hemorrhage is noted within the left occipital horn. SELLA AND PITUITARY GLAND:  Normal. ORBITS:  Normal. PARANASAL SINUSES:  Normal. VASCULATURE:  Evaluation of the major intracranial vasculature demonstrates appropriate flow voids. CALVARIUM AND SKULL BASE:  Normal. EXTRACRANIAL SOFT TISSUES:  Normal.     Impression: Interval enlargement of the dominant left anterior basal ganglionic mass lesion with greater surrounding vasogenic edema and mass effect. 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 Differential considerations include lymphoma, multicentric high-grade glioma, and less likely metastasis. Correlation with CSF cytology is recommended. Small amount of intraventricular hemorrhage. Interval progression of the previously noted neoplastic disease with the dominant mass in the left anterior basal ganglia and numerous growing ependymal and intraventricular nodular lesions. The study was marked in EPIC for immediate notification. Workstation performed: [id number]       I have personally reviewed labs, imaging studies, and pertinent reports.      This note has been generated by voice recognition software system.  Therefore, there may be spelling, grammar, and or syntax errors. Please contact if questions arise.

## 2025-05-10 NOTE — PROGRESS NOTES
05/10/25 1330   Pain Assessment   Pain Assessment Tool 0-10   Pain Score No Pain   Restrictions/Precautions   Precautions 1:1;Bed/chair alarms;Cognitive;Fall Risk;Multiple lines;Supervision on toilet/commode;Impulsive  (IV pole and on chemo precautions)   Weight Bearing Restrictions No   ROM Restrictions No   Cognition   Overall Cognitive Status Impaired   Arousal/Participation Alert;Cooperative   Subjective   Subjective Pt agreeable to PT tx   Lying to Sitting on Side of Bed   Type of Assistance Needed Supervision   Lying to Sitting on Side of Bed CARE Score 4   Sit to Stand   Type of Assistance Needed Incidental touching   Comment CG w/ and w/o RW with gait belt   Sit to Stand CARE Score 4   Bed-Chair Transfer   Type of Assistance Needed Incidental touching   Comment CG w/ and w/o RW with gait belt   Chair/Bed-to-Chair Transfer CARE Score 4   Walk 10 Feet   Type of Assistance Needed Incidental touching   Comment CG with RW vs no AD, + gait belt   Walk 10 Feet CARE Score 4   Walk 50 Feet with Two Turns   Type of Assistance Needed Incidental touching   Comment CG with RW vs no AD, + gait belt   Walk 50 Feet with Two Turns CARE Score 4   Walk 150 Feet   Type of Assistance Needed Incidental touching   Comment CG with RW vs no AD, + gait belt   Walk 150 Feet CARE Score 4   Ambulation   Primary Mode of Locomotion Prior to Admission Walk   Distance Walked (feet) 150 ft  (x2 RW; 150'x2 no AD)   Assist Device Roller Walker  (vs no AD with gait belt)   Gait Pattern Inconsistant Vicki;Slow Vicki;Narrow IRMA;Improper weight shift;Step through   Limitations Noted In Balance;Endurance;Sequencing;Speed;Strength;Swing   Provided Assistance with: Balance   Walk Assist Level Contact Guard   Findings slight increased lateral sway w/o AD but no overt LOB this session   Does the patient walk? 2. Yes   Wheel 50 Feet with Two Turns   Reason if not Attempted Activity not applicable   Wheel 50 Feet with Two Turns CARE Score 9  "  Wheel 150 Feet   Reason if not Attempted Activity not applicable   Wheel 150 Feet CARE Score 9   Toilet Transfer   Type of Assistance Needed Incidental touching   Comment use of grab bars to standard toilet as pt incontinent of urine start of session   Toilet Transfer CARE Score 4   Toilet Transfer   Findings modAx1 for clothing management and donning brief as pt incontinent of urine start of session and unaware of incontinence   Therapeutic Interventions   Balance amb 150' no AD CGA; amb dice kicks 150' CG-modAx1 for balance; 4\" alt step taps w/o UE support x5 CGA, progressed to 8\" alt step taps w/o UE support CG-minAx1 x10; static stance on airex FT 1' CGA, modified tandem on airex with EO/EC 3' total CG-modAx1 for balance; standing on green balance disc with ball toss 3' total CG/minAx1 for balance; walking thru cones and performing alternating step taps to cone x10 CG-minAx1   Assessment   Treatment Assessment Pt engaged in skilled PT session with focus on functional mobility trng w/ RW as well as working w/o RW to improve pt's balance. Start of session pt unaware of being incontinent of urine and assisted pt with cleanup and perihygiene. With RW, pt requires cues for safety especially during STS as pt often reaches to push up from RW. Will need continued practice with RW if recommended for dc home. Remainder of session focused on balance tasks w/ pt and ambulating w/o AD to mimic PLOF per dtr as dtr reports pt was very active and went to gym regularly PTA. Discussed with dtr plan for d/c in which she reports pt's s.o. Naldo may take some time off of work. Will need to confirm with Naldo plan for d/c as at this time for safety pt will require S upon d/c due to cognitive deficits as pt can be impulsive and quick to move placing him at increased fall risk. At this time continue with POC focusing on high level balance interventions and NPP interventions to reduce fall risk as well as continue to work on functional " mobilities w/ pt to improve safety and stability in order to meet set LTGs.   Family/Caregiver Present dtrs Elizabeth and Marina along with Elizabeth's  and dtr- observed majority of session   Problem List Decreased strength;Decreased range of motion;Decreased endurance;Impaired balance;Decreased mobility;Decreased cognition;Impaired judgement;Decreased safety awareness   Barriers to Discharge Inaccessible home environment;Decreased caregiver support   PT Barriers   Functional Limitation Car transfers;Ramp negotiation;Stair negotiation;Standing;Transfers;Walking   Plan   Treatment/Interventions Functional transfer training;LE strengthening/ROM;Elevations;Therapeutic exercise;Endurance training;Bed mobility;Gait training   Progress Progressing toward goals   Discharge Recommendation   Equipment Recommended   (tbd)   PT Therapy Minutes   PT Time In 1330   PT Time Out 1445   PT Total Time (minutes) 75   PT Mode of treatment - Individual (minutes) 75   PT Mode of treatment - Concurrent (minutes) 0   PT Mode of treatment - Group (minutes) 0   PT Mode of treatment - Co-treat (minutes) 0   PT Mode of Treatment - Total time(minutes) 75 minutes   PT Cumulative Minutes 195   Therapy Time missed   Time missed? No

## 2025-05-10 NOTE — PLAN OF CARE
Problem: PAIN - ADULT  Goal: Verbalizes/displays adequate comfort level or baseline comfort level  Description: Interventions:- Encourage patient to monitor pain and request assistance- Assess pain using appropriate pain scale- Administer analgesics based on type and severity of pain and evaluate response- Implement non-pharmacological measures as appropriate and evaluate response- Consider cultural and social influences on pain and pain management- Notify physician/advanced practitioner if interventions unsuccessful or patient reports new pain  Outcome: Progressing     Problem: INFECTION - ADULT  Goal: Absence or prevention of progression during hospitalization  Description: INTERVENTIONS:- Assess and monitor for signs and symptoms of infection- Monitor lab/diagnostic results- Monitor all insertion sites, i.e. indwelling lines, tubes, and drains- Monitor endotracheal if appropriate and nasal secretions for changes in amount and color- [place] appropriate cooling/warming therapies per order- Administer medications as ordered- Instruct and encourage patient and family to use good hand hygiene technique- Identify and instruct in appropriate isolation precautions for identified infection/condition  Outcome: Progressing     Problem: SAFETY ADULT  Goal: Patient will remain free of falls  Description: INTERVENTIONS:- Educate patient/family on patient safety including physical limitations- Instruct patient to call for assistance with activity - Consult OT/PT to assist with strengthening/mobility - Keep Call bell within reach- Keep bed low and locked with side rails adjusted as appropriate- Keep care items and personal belongings within reach- Initiate and maintain comfort rounds- Make Fall Risk Sign visible to staff- Offer Toileting every  Hours, in advance of need- Initiate/Maintain alarm- Obtain necessary fall risk management equipment: - Apply yellow socks and bracelet for high fall risk patients- Consider moving  patient to room near nurses station  Outcome: Progressing  Goal: Maintain or return to baseline ADL function  Description: INTERVENTIONS:-  Assess patient's ability to carry out ADLs; assess patient's baseline for ADL function and identify physical deficits which impact ability to perform ADLs (bathing, care of mouth/teeth, toileting, grooming, dressing, etc.)- Assess/evaluate cause of self-care deficits - Assess range of motion- Assess patient's mobility; develop plan if impaired- Assess patient's need for assistive devices and provide as appropriate- Encourage maximum independence but intervene and supervise when necessary- Involve family in performance of ADLs- Assess for home care needs following discharge - Consider OT consult to assist with ADL evaluation and planning for discharge- Provide patient education as appropriate  Outcome: Progressing  Goal: Maintains/Returns to pre admission functional level  Description: INTERVENTIONS:- Perform AM-PAC 6 Click Basic Mobility/ Daily Activity assessment daily.- Set and communicate daily mobility goal to care team and patient/family/caregiver. - Collaborate with rehabilitation services on mobility goals if consulted- Perform Range of Motion  times a day.- Reposition patient every  hours.- Dangle patient  times a day- Stand patient  times a day- Ambulate patient  times a day- Out of bed to chair  times a day - Out of bed for meals  times a day- Out of bed for toileting- Record patient progress and toleration of activity level   Outcome: Progressing     Problem: DISCHARGE PLANNING  Goal: Discharge to home or other facility with appropriate resources  Description: INTERVENTIONS:- Identify barriers to discharge w/patient and caregiver- Arrange for needed discharge resources and transportation as appropriate- Identify discharge learning needs (meds, wound care, etc.)- Arrange for interpretive services to assist at discharge as needed- Refer to Case Management Department for  coordinating discharge planning if the patient needs post-hospital services based on physician/advanced practitioner order or complex needs related to functional status, cognitive ability, or social support system  Outcome: Progressing     Problem: Prexisting or High Potential for Compromised Skin Integrity  Goal: Skin integrity is maintained or improved  Description: INTERVENTIONS:- Identify patients at risk for skin breakdown- Assess and monitor skin integrity- Assess and monitor nutrition and hydration status- Monitor labs - Assess for incontinence - Turn and reposition patient- Assist with mobility/ambulation- Relieve pressure over bony prominences- Avoid friction and shearing- Provide appropriate hygiene as needed including keeping skin clean and dry- Evaluate need for skin moisturizer/barrier cream- Collaborate with interdisciplinary team - Patient/family teaching- Consider wound care consult   Outcome: Progressing     Problem: Nutrition/Hydration-ADULT  Goal: Nutrient/Hydration intake appropriate for improving, restoring or maintaining nutritional needs  Description: Monitor and assess patient's nutrition/hydration status for malnutrition. Collaborate with interdisciplinary team and initiate plan and interventions as ordered.  Monitor patient's weight and dietary intake as ordered or per policy. Utilize nutrition screening tool and intervene as necessary. Determine patient's food preferences and provide high-protein, high-caloric foods as appropriate. INTERVENTIONS:- Monitor oral intake, urinary output, labs, and treatment plans- Assess nutrition and hydration status and recommend course of action- Evaluate amount of meals eaten- Assist patient with eating if necessary - Allow adequate time for meals- Recommend/ encourage appropriate diets, oral nutritional supplements, and vitamin/mineral supplements- Order, calculate, and assess calorie counts as needed- Recommend, monitor, and adjust tube feedings and  TPN/PPN based on assessed needs- Assess need for intravenous fluids- Provide specific nutrition/hydration education as appropriate- Include patient/family/caregiver in decisions related to nutrition  Outcome: Progressing

## 2025-05-10 NOTE — ASSESSMENT & PLAN NOTE
Hemoglobin A1C was 6.6 on 2/22/25  Sees Endo [facility] in [place]  Home:  Janumet XR  qd  Here: Lantus 10 U qhs/Lispro 4 U TID/Metformin 1000 mg qd  On DM diet  Insulin wasn't here 5/8 AM so didn't get Lispro with breakfast which explains why lunch BS was elevated.   Metformin was ordered on transfer to be restarted 5/8/25 AM  5/8/25:  BS at dinnertime 195, evening of 5/7/25 was 250.  Since Metformin was just started 5/8/25 AM, reduced Lispro to 3U TID from 8U   On 5/10/25, increased Lispro WM to 4U TID   BSs should continue to improve with steroid being tapered  No other changes for today 5/11/25

## 2025-05-10 NOTE — ASSESSMENT & PLAN NOTE
"Patient with development of worsening confusion, and was seen in the ED on 3/24/2025.  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  Lymphoma/leukemia panel was nondiagnostic  CTH 4/3/25: concerning for worsening hydrocephalus.   Repeat LP 4/7/25 = undiagnostic again   MRI brain 4/11/25: \"Interval enlargement of the dominant left anterior basal ganglionic mass lesion with greater surrounding vasogenic edema and mass effect. 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  S/p EVD, self removed 4/26  S/p Keppra 500mg BID x 7 days for postoperative seizure ppx   Started on chemotherapy during hospital stay and is for weekly Rituximab and Methotrexate every 2 weeks  Daily serum methotrexate levels until level is less than 0.1  Per heme-onc monitoring urine pH and Methotrexate levels do not need further monitoring at this time   Continue Dexamethasone taper = LD will be 5/19/25  Maintain PICC line  H-O following  Had Rituxan 5/10/25  "

## 2025-05-11 LAB
GLUCOSE SERPL-MCNC: 141 MG/DL (ref 65–140)
GLUCOSE SERPL-MCNC: 207 MG/DL (ref 65–140)
GLUCOSE SERPL-MCNC: 210 MG/DL (ref 65–140)
GLUCOSE SERPL-MCNC: 255 MG/DL (ref 65–140)

## 2025-05-11 PROCEDURE — 97130 THER IVNTJ EA ADDL 15 MIN: CPT

## 2025-05-11 PROCEDURE — 97129 THER IVNTJ 1ST 15 MIN: CPT

## 2025-05-11 PROCEDURE — 97530 THERAPEUTIC ACTIVITIES: CPT

## 2025-05-11 PROCEDURE — 97535 SELF CARE MNGMENT TRAINING: CPT

## 2025-05-11 PROCEDURE — 97116 GAIT TRAINING THERAPY: CPT

## 2025-05-11 PROCEDURE — 99232 SBSQ HOSP IP/OBS MODERATE 35: CPT | Performed by: NURSE PRACTITIONER

## 2025-05-11 PROCEDURE — 97110 THERAPEUTIC EXERCISES: CPT

## 2025-05-11 PROCEDURE — 97112 NEUROMUSCULAR REEDUCATION: CPT

## 2025-05-11 PROCEDURE — 82948 REAGENT STRIP/BLOOD GLUCOSE: CPT

## 2025-05-11 RX ADMIN — INSULIN LISPRO 4 UNITS: 100 INJECTION, SOLUTION INTRAVENOUS; SUBCUTANEOUS at 21:31

## 2025-05-11 RX ADMIN — DEXAMETHASONE 2 MG: 2 TABLET ORAL at 21:31

## 2025-05-11 RX ADMIN — CHLORHEXIDINE GLUCONATE 15 ML: 1.2 SOLUTION ORAL at 21:30

## 2025-05-11 RX ADMIN — QUETIAPINE 50 MG: 25 TABLET ORAL at 21:31

## 2025-05-11 RX ADMIN — HEPARIN SODIUM 5000 UNITS: 5000 INJECTION INTRAVENOUS; SUBCUTANEOUS at 06:07

## 2025-05-11 RX ADMIN — SENNOSIDES 17.2 MG: 8.6 TABLET, FILM COATED ORAL at 12:04

## 2025-05-11 RX ADMIN — QUETIAPINE 12.5 MG: 25 TABLET ORAL at 12:04

## 2025-05-11 RX ADMIN — HEPARIN SODIUM 5000 UNITS: 5000 INJECTION INTRAVENOUS; SUBCUTANEOUS at 21:30

## 2025-05-11 RX ADMIN — PANTOPRAZOLE SODIUM 40 MG: 40 TABLET, DELAYED RELEASE ORAL at 06:05

## 2025-05-11 RX ADMIN — INSULIN LISPRO 4 UNITS: 100 INJECTION, SOLUTION INTRAVENOUS; SUBCUTANEOUS at 16:01

## 2025-05-11 RX ADMIN — ATORVASTATIN CALCIUM 20 MG: 20 TABLET, FILM COATED ORAL at 09:08

## 2025-05-11 RX ADMIN — ALLOPURINOL 300 MG: 300 TABLET ORAL at 09:08

## 2025-05-11 RX ADMIN — INSULIN LISPRO 4 UNITS: 100 INJECTION, SOLUTION INTRAVENOUS; SUBCUTANEOUS at 12:05

## 2025-05-11 RX ADMIN — INSULIN GLARGINE 10 UNITS: 100 INJECTION, SOLUTION SUBCUTANEOUS at 21:30

## 2025-05-11 RX ADMIN — DEXAMETHASONE 4 MG: 4 TABLET ORAL at 09:08

## 2025-05-11 RX ADMIN — DOCUSATE SODIUM 100 MG: 100 CAPSULE, LIQUID FILLED ORAL at 09:08

## 2025-05-11 RX ADMIN — METFORMIN ER 500 MG 1000 MG: 500 TABLET ORAL at 09:08

## 2025-05-11 RX ADMIN — INSULIN LISPRO 4 UNITS: 100 INJECTION, SOLUTION INTRAVENOUS; SUBCUTANEOUS at 12:04

## 2025-05-11 RX ADMIN — INSULIN LISPRO 6 UNITS: 100 INJECTION, SOLUTION INTRAVENOUS; SUBCUTANEOUS at 16:02

## 2025-05-11 RX ADMIN — DOCUSATE SODIUM 100 MG: 100 CAPSULE, LIQUID FILLED ORAL at 17:37

## 2025-05-11 RX ADMIN — INSULIN LISPRO 4 UNITS: 100 INJECTION, SOLUTION INTRAVENOUS; SUBCUTANEOUS at 09:12

## 2025-05-11 RX ADMIN — Medication 6 MG: at 21:30

## 2025-05-11 RX ADMIN — CHLORHEXIDINE GLUCONATE 15 ML: 1.2 SOLUTION ORAL at 09:08

## 2025-05-11 NOTE — ASSESSMENT & PLAN NOTE
Monitor status post methotrexate  Previous baseline as OP 5/2023-2/22/25 was 1.1 to 1.0  CMP 5/9/25 with creatinine of 1.18 down from 1.23 on 5/8 and on 5/10 was 1.15  CMP today 5/12/25 shows creatinine stable at 1.1

## 2025-05-11 NOTE — ASSESSMENT & PLAN NOTE
"AST is 111, previously 114,  ALT is 322 previously 149  D/w H-O, they are aware =  \"Could be due to recent chemotherapy versus antibiotics versus liver lesions\".    CMP 5/9/25 = AST 57 (previously 111),  (previously 322)  Almost totally resolved now with AST 57 and   "

## 2025-05-11 NOTE — PLAN OF CARE
Problem: PAIN - ADULT  Goal: Verbalizes/displays adequate comfort level or baseline comfort level  Description: Interventions:- Encourage patient to monitor pain and request assistance- Assess pain using appropriate pain scale- Administer analgesics based on type and severity of pain and evaluate response- Implement non-pharmacological measures as appropriate and evaluate response- Consider cultural and social influences on pain and pain management- Notify physician/advanced practitioner if interventions unsuccessful or patient reports new pain  Outcome: Progressing     Problem: INFECTION - ADULT  Goal: Absence or prevention of progression during hospitalization  Description: INTERVENTIONS:- Assess and monitor for signs and symptoms of infection- Monitor lab/diagnostic results- Monitor all insertion sites, i.e. indwelling lines, tubes, and drains- Monitor endotracheal if appropriate and nasal secretions for changes in amount and color- [place] appropriate cooling/warming therapies per order- Administer medications as ordered- Instruct and encourage patient and family to use good hand hygiene technique- Identify and instruct in appropriate isolation precautions for identified infection/condition  Outcome: Progressing     Problem: SAFETY ADULT  Goal: Patient will remain free of falls  Description: INTERVENTIONS:- Educate patient/family on patient safety including physical limitations- Instruct patient to call for assistance with activity - Consult OT/PT to assist with strengthening/mobility - Keep Call bell within reach- Keep bed low and locked with side rails adjusted as appropriate- Keep care items and personal belongings within reach- Initiate and maintain comfort rounds- Make Fall Risk Sign visible to staff- Offer Toileting every  Hours, in advance of need- Initiate/Maintain alarm- Obtain necessary fall risk management equipment: - Apply yellow socks and bracelet for high fall risk patients- Consider moving  patient to room near nurses station  Outcome: Progressing  Goal: Maintain or return to baseline ADL function  Description: INTERVENTIONS:-  Assess patient's ability to carry out ADLs; assess patient's baseline for ADL function and identify physical deficits which impact ability to perform ADLs (bathing, care of mouth/teeth, toileting, grooming, dressing, etc.)- Assess/evaluate cause of self-care deficits - Assess range of motion- Assess patient's mobility; develop plan if impaired- Assess patient's need for assistive devices and provide as appropriate- Encourage maximum independence but intervene and supervise when necessary- Involve family in performance of ADLs- Assess for home care needs following discharge - Consider OT consult to assist with ADL evaluation and planning for discharge- Provide patient education as appropriate  Outcome: Progressing  Goal: Maintains/Returns to pre admission functional level  Description: INTERVENTIONS:- Perform AM-PAC 6 Click Basic Mobility/ Daily Activity assessment daily.- Set and communicate daily mobility goal to care team and patient/family/caregiver. - Collaborate with rehabilitation services on mobility goals if consulted- Perform Range of Motion  times a day.- Reposition patient every  hours.- Dangle patient  times a day- Stand patient  times a day- Ambulate patient  times a day- Out of bed to chair  times a day - Out of bed for meals  times a day- Out of bed for toileting- Record patient progress and toleration of activity level   Outcome: Progressing     Problem: DISCHARGE PLANNING  Goal: Discharge to home or other facility with appropriate resources  Description: INTERVENTIONS:- Identify barriers to discharge w/patient and caregiver- Arrange for needed discharge resources and transportation as appropriate- Identify discharge learning needs (meds, wound care, etc.)- Arrange for interpretive services to assist at discharge as needed- Refer to Case Management Department for  coordinating discharge planning if the patient needs post-hospital services based on physician/advanced practitioner order or complex needs related to functional status, cognitive ability, or social support system  Outcome: Progressing     Problem: Prexisting or High Potential for Compromised Skin Integrity  Goal: Skin integrity is maintained or improved  Description: INTERVENTIONS:- Identify patients at risk for skin breakdown- Assess and monitor skin integrity- Assess and monitor nutrition and hydration status- Monitor labs - Assess for incontinence - Turn and reposition patient- Assist with mobility/ambulation- Relieve pressure over bony prominences- Avoid friction and shearing- Provide appropriate hygiene as needed including keeping skin clean and dry- Evaluate need for skin moisturizer/barrier cream- Collaborate with interdisciplinary team - Patient/family teaching- Consider wound care consult   Outcome: Progressing     Problem: Nutrition/Hydration-ADULT  Goal: Nutrient/Hydration intake appropriate for improving, restoring or maintaining nutritional needs  Description: Monitor and assess patient's nutrition/hydration status for malnutrition. Collaborate with interdisciplinary team and initiate plan and interventions as ordered.  Monitor patient's weight and dietary intake as ordered or per policy. Utilize nutrition screening tool and intervene as necessary. Determine patient's food preferences and provide high-protein, high-caloric foods as appropriate. INTERVENTIONS:- Monitor oral intake, urinary output, labs, and treatment plans- Assess nutrition and hydration status and recommend course of action- Evaluate amount of meals eaten- Assist patient with eating if necessary - Allow adequate time for meals- Recommend/ encourage appropriate diets, oral nutritional supplements, and vitamin/mineral supplements- Order, calculate, and assess calorie counts as needed- Recommend, monitor, and adjust tube feedings and  TPN/PPN based on assessed needs- Assess need for intravenous fluids- Provide specific nutrition/hydration education as appropriate- Include patient/family/caregiver in decisions related to nutrition  Outcome: Progressing

## 2025-05-11 NOTE — PROGRESS NOTES
"Progress Note - Hospitalist   Name: Alexis Dennison 64 y.o. male I MRN: [id number]  Unit/Bed#: -01 I Date of Admission: 5/7/2025   Date of Service: 5/12/2025 I Hospital Day: 5    Assessment & Plan  Primary central nervous system (CNS) lymphoma  Patient with development of worsening confusion, and was seen in the ED on 3/24/2025.  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  Lymphoma/leukemia panel was nondiagnostic  CTH 4/3/25: concerning for worsening hydrocephalus.   Repeat LP 4/7/25 = undiagnostic again   MRI brain 4/11/25: \"Interval enlargement of the dominant left anterior basal ganglionic mass lesion with greater surrounding vasogenic edema and mass effect. 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  S/p EVD, self removed 4/26  S/p Keppra 500mg BID x 7 days for postoperative seizure ppx   Started on chemotherapy during hospital stay and is for weekly Rituximab and Methotrexate every 2 weeks  Daily serum methotrexate levels until level is less than 0.1  Per heme-onc monitoring urine pH and Methotrexate levels do not need further monitoring at this time   Continue Dexamethasone taper = LD will be 5/19/25  Maintain PICC line  H-O following  Had Rituxan 5/10/25  Type 2 diabetes mellitus with hyperglycemia, without long-term current use of insulin (HCC)  Hemoglobin A1C was 6.6 on 2/22/25  Sees End[facility] in [place]  Home:  Janumet XR  qd  Here: Lantus " "10 U qhs/Lispro 4 U TID/Metformin 1000 mg qd  Insulin wasn't here 5/8 AM so didn't get Lispro with breakfast which explains why lunch BS was elevated.   Metformin was ordered on transfer to be restarted 5/8/25 AM  5/8/25:  BS at dinnertime 195, evening of 5/7/25 was 250.  Since Metformin was just started 5/8/25 AM, reduced Lispro to 3U TID from 8U   On 5/10/25, increased Lispro WM to 4U TID   BSs should continue to improve with steroid being tapered  Will increase Lispro WM to 6U TID today 5/12/25  Continue DM diet  Mixed hyperlipidemia  Continue atorvastatin  Thyroid nodule  TSH/free T4 4/21/25 = 0.019/1.13  Nonemergent outpatient endocrine follow-up.   Will require outpatient TSH, free T4, +/- further imaging with endocrine such as radioactive uptake   Pulmonary nodule  CT chest 3 months follow-up  Liver lesion  Patient will require outpatient follow-up  Encephalopathy  Improving  Continue Seroquel  Continue steroid taper  On 1:1  LETY (acute kidney injury) (HCC)  Monitor status post methotrexate  Previous baseline as OP 5/2023-2/22/25 was 1.1 to 1.0  CMP 5/9/25 with creatinine of 1.18 down from 1.23 on 5/8 and on 5/10 was 1.15  CMP today 5/12/25 shows creatinine stable at 1.1  Sepsis (HCC)  Resolved  Continue to monitor off antibiotics  E coli bacteremia  Patient completed 7 days of IV cefazolin which was finished on May 5  Was due to E. Coli UTI  HTN (hypertension)  Home:  Losartan -25 qd  Here:  no meds for now since he is normotensive  Transaminitis  AST is 111, previously 114,  ALT is 322 previously 149  D/w H-O, they are aware =  \"Could be due to recent chemotherapy versus antibiotics versus liver lesions\".    CMP 5/9/25 = AST 57 (previously 111),  (previously 322)  Almost totally resolved now with AST 57 and   Leukopenia  WBC is stable at 3.3 on 5/9/25  H-O following  Hyponatremia  Mild at 132  Will watch  CMP 5/12/25 now shows resolution of the hyponatremia  Thrombocytopenia " (HCC)  Platelet count down to 117 from 160 today 5/12/25  ?from chemo  Per H-O    The above assessment and plan was reviewed and updated as determined by my evaluation of the patient on 5/12/2025.    History of Present Illness   Patient seen and examined. Patients overnight issues or events were reviewed with nursing staff. New or overnight issues include the following:   No new or overnight issues.  Offers no complaints    Review of Systems   All other systems reviewed and are negative.      Objective :  Temp:  [97.9 °F (36.6 °C)-98.3 °F (36.8 °C)] 98 °F (36.7 °C)  HR:  [74-91] 74  BP: (116-133)/(71-85) 116/71  Resp:  [16-18] 18  SpO2:  [98 %-100 %] 100 %  O2 Device: None (Room air)    Invasive Devices       Peripherally Inserted Central Catheter Line  Duration             PICC Line 05/01/25 Left Brachial 10 days              Drain  Duration             External Urinary Catheter 9 days                    Physical Exam  General Appearance: no distress, nontoxic appearing  HEENT:  External ear normal.  Nose normal w/o drainage. Mucous membranes are moist. Oropharynx is clear. Conjunctiva clear w/o icterus or redness.  Neck:  Supple, normal ROM  Lungs: BBS without crackles/wheeze/rhonchi; respirations unlabored with normal inspiratory/expiratory effort.  No retractions noted.  On RA  CV: regular rate and rhythm; no rubs/murmurs/gallops, PMI normal   ABD: Abdomen is soft.  Bowel sounds all quadrants.  Nontender with no distention.    EXT: no edema  Skin: normal turgor, normal texture  Psych: affect normal, mood normal  Neuro: AA      The above physical exam was reviewed and updated as determined by my evaluation of the patient on 5/12/2025.      Lab Results: I have reviewed the following results:  Results from last 7 days   Lab Units 05/12/25  0614 05/09/25  0512   WBC Thousand/uL 5.16 3.36*   HEMOGLOBIN g/dL 9.3* 8.8*   HEMATOCRIT % 26.4* 24.7*   PLATELETS Thousands/uL 117* 160     Results from last 7 days   Lab Units  05/12/25  0614 05/10/25  1023   SODIUM mmol/L 136 132*   POTASSIUM mmol/L 4.3 4.4   CHLORIDE mmol/L 101 98   CO2 mmol/L 30 25   BUN mg/dL 33* 34*   CREATININE mg/dL 1.11 1.15   CALCIUM mg/dL 9.0 9.1             Results from last 7 days   Lab Units 05/12/25  0605 05/11/25  2113 05/11/25  1600   POC GLUCOSE mg/dl 120 210* 255*       Imaging Results Review: No pertinent imaging studies reviewed.  Other Study Results Review: No additional pertinent studies reviewed.    Review of Scheduled Meds: Medications  reviewed and reconciled as needed  Current Facility-Administered Medications   Medication Dose Route Frequency Provider Last Rate    acetaminophen  650 mg Oral Q6H PRN Crispin Arana MD      allopurinol  300 mg Oral Daily Crispin Arana MD      alteplase  2 mg Intracatheter Q1MIN PRN Crispin Arana MD      alteplase  2 mg Intracatheter Q1MIN PRN Magali Lee MD      aluminum-magnesium hydroxide-simethicone  30 mL Oral Q4H PRN Crispin Arana MD      atorvastatin  20 mg Oral Daily Crispin Arana MD      bisacodyl  10 mg Rectal Daily PRN Jessica Arreola DO      calcium carbonate  1,000 mg Oral Daily PRN Crispin Arana MD      chlorhexidine  15 mL Mouth/Throat Q12H [facility] Crispin Arana MD      dexamethasone  2 mg Oral Q12H CAIT Crispin Arana MD      Followed by    [START ON 5/15/2025] dexamethasone  1 mg Oral Q12H CAIT Crispin Arana MD      docusate sodium  100 mg Oral BID Jessica BHUPENDRA Arreola, DO      heparin (porcine)  5,000 Units Subcutaneous Q8H CAIT Crispin Arana MD      insulin glargine  10 Units Subcutaneous HS Crispin Arana MD      insulin lispro  2-12 Units Subcutaneous 4x Daily (AC & HS) Crispin Arana MD      insulin lispro  4 Units Subcutaneous TID With Meals PATI Porras      melatonin  6 mg Oral HS Crispin Arana MD      metFORMIN  1,000 mg Oral Daily Crispin Arana MD      OLANZapine  5 mg Intramuscular BID PRN Crispin Arana MD      ondansetron  4 mg Intravenous Q6H PRN Crispin  MD Yasmeen      oxyCODONE  2.5 mg Oral Q4H PRN Crispin Arana MD      Or    oxyCODONE  5 mg Oral Q4H PRN Crispin Arana MD      pantoprazole  40 mg Oral Early Morning Crispin Arana MD      polyethylene glycol  17 g Oral Daily PRN Jessica T Arreola, DO      QUEtiapine  12.5 mg Oral Daily Crispin Arana MD      QUEtiapine  50 mg Oral HS Crispin Arana MD      senna  2 tablet Oral Daily With Lunch Jessica T Arreola, DO      sodium chloride  20 mL/hr Intravenous Once PRN Crispin Arana MD      sodium chloride  20 mL/hr Intravenous Once PRN Magali Lee MD         VTE Pharmacologic Prophylaxis: HSQ  Code Status: Level 1 - Full Code  Current Length of Stay: 5 day(s)    Administrative Statements     ** Please Note:  voice to text software may have been used in the creation of this document. Although proof errors in transcription or interpretation are a potential of such software**

## 2025-05-11 NOTE — PROGRESS NOTES
05/11/25 1030   Pain Assessment   Pain Assessment Tool 0-10   Pain Score No Pain   Restrictions/Precautions   Precautions 1:1;Bed/chair alarms;Cognitive;Fall Risk;Impulsive;Supervision on toilet/commode  (chemo precautions)   Weight Bearing Restrictions No   ROM Restrictions No   Comprehension   Comprehension (FIM) 3 - Understands basic info/conversation 50-74% of time   Expression   Expression (FIM) 3 - Expresses basic info/needs 50-74% of time   Social Interaction   Social Interaction (FIM) 4 - Interacts 75-89% of time   Problem Solving   Problem solving (FIM) 1 - Solves basic problems less than 25% of time   Memory   Memory (FIM) 1 - Recognizes, recalls/performs less than 25%   Speech/Language/Cognition Assessmetn   Treatment Assessment Pt seen for skilled speech therapy session targeting cognitive linguistic communication skills. Pt sleeping upon arrival but family present including daughter Elizabeth and her  Eric and their daughter Shea, pt's other daughter Joselin and son Drew.  Provided intro as to skilled SLP service for cognition- explained pt's deficits overall severely impaired with memory, attention, problem solving, reasoning, safety and insight as well as some language skills with decreased expressive/receptive language. Discussed pt would need 24hr S/A at home for safety given current cognitive deficits which includes assistance with ADLs and IADLs. Pt also incontinent and starting Chemo tx therefore encouraged family to discuss with medical the plan for that at home as pt is also on chemo contact precautions when handling bodily fluids. Elizabeth expressed a plan for pt's wife Naldo to request [facility] from work to be home for 24hr care and as Elizabeth is a  and soon on summer break, therefore can assist more during the summer. Expressed that team will probably set up a family meeting early this week to review overall deficits and plans for safe discharge home. All receptive to this. Elizabeth  "did start to make a \"Memory Book\" in a notebook from home- has all his family information as well as topics that pt may perseverate on or that staff can help redirect with appropriate answers. Pt was able to read through book- SLP had pt read out loud to try to assess comprehension and pt was able to do for portions, but little elaboration on topics and skipping portions on pages. Family also brought in pictures and labeled the back as to who was in them and pt was able to recognize most people with min A. SLP attempted to engage pt in more conversation and recall regarding pictures as to who was in them, where are they, what are they doing, what season was it, location, etc. Pt participated in some questions, however conts with flat affect. Plan to cont to target functional cog for increased LT/ST memory, orientation, functional safety and family training. Pt overall is improving with skilled SLP services and will cont to benefit to maximize overall cognitive linguistic communication abilities at this time.   SLP Therapy Minutes   SLP Time In 1030   SLP Time Out 1100   SLP Total Time (minutes) 30   SLP Mode of treatment - Individual (minutes) 30   SLP Mode of treatment - Concurrent (minutes) 0   SLP Mode of treatment - Group (minutes) 0   SLP Mode of treatment - Co-treat (minutes) 0   SLP Mode of Treatment - Total time(minutes) 30 minutes   SLP Cumulative Minutes 180   Therapy Time missed   Time missed? No       "

## 2025-05-11 NOTE — ASSESSMENT & PLAN NOTE
Hemoglobin A1C was 6.6 on 2/22/25  Sees [facility] in [place]  Home:  Janumet XR  qd  Here: Lantus 10 U qhs/Lispro 4 U TID/Metformin 1000 mg qd  Insulin wasn't here 5/8 AM so didn't get Lispro with breakfast which explains why lunch BS was elevated.   Metformin was ordered on transfer to be restarted 5/8/25 AM  5/8/25:  BS at dinnertime 195, evening of 5/7/25 was 250.  Since Metformin was just started 5/8/25 AM, reduced Lispro to 3U TID from 8U   On 5/10/25, increased Lispro WM to 4U TID   BSs should continue to improve with steroid being tapered  Will increase Lispro WM to 6U TID today 5/12/25  Continue DM diet

## 2025-05-11 NOTE — PROGRESS NOTES
"   05/11/25 0800   Pain Assessment   Pain Assessment Tool FLACC   Pain Rating: FLACC (Rest) - Face 0   Pain Rating: FLACC (Rest) - Legs 0   Pain Rating: FLACC (Rest) - Activity 0   Pain Rating: FLACC (Rest) - Cry 0   Pain Rating: FLACC (Rest) - Consolability 0   Score: FLACC (Rest) 0   Pain Rating: FLACC (Activity) - Face 0   Pain Rating: FLACC (Activity) - Legs 0   Pain Rating: FLACC (Activity) - Activity 0   Pain Rating: FLACC (Activity) - Cry 0   Pain Rating: FLACC (Activity) - Consolability 0   Score: FLACC (Activity) 0   Restrictions/Precautions   Precautions Bed/chair alarms;Cognitive;Fall Risk;1:1;Impulsive;Supervision on toilet/commode  (chemo prec)   Lifestyle   Autonomy \"I have to go to [employer] today...It's for work\"   Putting On/Taking Off Footwear   Type of Assistance Needed Supervision   Comment sup seated to doff  socks and don socks and slip on sneakers   Putting On/Taking Off Footwear CARE Score 4   Sit to Stand   Type of Assistance Needed Incidental touching   Comment CGA no AD giat belt   Sit to Stand CARE Score 4   Toileting Hygiene   Type of Assistance Needed Physical assistance   Physical Assistance Level 25% or less   Comment Alcides for pants management in stance following voiding   Toileting Hygiene CARE Score 3   Toileting   Findings continent of urine, reported to RN Nancy   Toilet Transfer   Comment Pt stood to void w/ CGA   Transfers   Additional Comments fxnl mobility in room/halls/OT gym Alcides no AD w/ gait belt, noted somewhat wide IRMA, dec step length, imtermittent lateral deviaiton and seeking items to steady self on, cues to not do so as OT was providing PA. Dec gait speed   Cognition   Overall Cognitive Status Impaired   Arousal/Participation Alert;Cooperative   Attention Attends with cues to redirect   Orientation Level Oriented to person;Disoriented to place;Disoriented to time;Disoriented to situation   Memory Decreased long term memory;Decreased short term memory;Decreased recall " "of recent events;Decreased recall of precautions;Decreased recall of biographical information   Following Commands Follows one step commands inconsistently   Comments impaired, reviewed todays date over 5times t/o session, pt unable to recall any details of date. pt does often report that it is june or july 2011. Reports place as \"[place], [place]\" which is where he also reports living.  Declines living at his home address in [place] provided in chart.   Additional Activities   Additional Activities Comments Basic fxnl cog activities, pt presented w/ todays newspaper. Asked pt to report todays date, w/ extra time he did so correctly. Asked pt to report day of week, he was able to use newspaper to do so correctly w/ extra time. Asked pt to locate several different sections of paper, he was able to do this appropriately but w/ extra time. Asked pt to locate weather section and report on todays weather, he was not able to determine todays weather after locating the section, required maxA. Asked pt to identify days this upcoming week that called for rain in forecast, pt unable to do so, requried maxA to complete. Seond fxnl cog task of \"same or dffierent\" worksheet, pt required extra time and OT to block out all other lines other than one pt was currently working on. Pt then able to identify same or different 60% of the time, but does get easily confused and cognitively fatigues.   Activity Tolerance   Activity Tolerance Patient limited by fatigue   Assessment   Treatment Assessment OT session focusing fxnl cog, toileting mobility. pt remains highly impaired most related to his fxnl cognition. 1:1 remains appropriate at ths time as pt is high fall risk, dec balance, poor insight, and is only oriented to self consistenly. Pt continues to require skilled hands on assist in order to maintain his safety. OT to continue POC.   Prognosis Fair   Problem List Decreased strength;Decreased range of motion;Decreased " endurance;Impaired balance;Decreased mobility;Decreased coordination;Decreased safety awareness;Impaired judgement;Decreased cognition   Plan   Treatment/Interventions ADL retraining;Functional transfer training;Therapeutic exercise;Endurance training;Cognitive reorientation;Patient/family training;Equipment eval/education;Compensatory technique education;Continued evaluation;Spoke to nursing   Progress Slow progress, cognitive deficits   OT Therapy Minutes   OT Time In 0830   OT Time Out 1000   OT Total Time (minutes) 90   OT Mode of treatment - Individual (minutes) 90   OT Mode of treatment - Concurrent (minutes) 0   OT Mode of treatment - Group (minutes) 0   OT Mode of treatment - Co-treat (minutes) 0   OT Mode of Treatment - Total time(minutes) 90 minutes   OT Cumulative Minutes 285   Therapy Time missed   Time missed? No

## 2025-05-11 NOTE — PROGRESS NOTES
"   05/11/25 1300   Pain Assessment   Pain Score No Pain   Restrictions/Precautions   Precautions 1:1;Bed/chair alarms;Fall Risk;Impulsive;Supervision on toilet/commode;Cognitive  (chemo precautions)   Cognition   Arousal/Participation Alert;Cooperative   Attention Attends with cues to redirect   Memory Decreased long term memory;Decreased recall of biographical information;Decreased short term memory;Decreased recall of recent events;Decreased recall of precautions   Following Commands Follows one step commands inconsistently   Subjective   Subjective \"I feel nauseaos\". Offered to pt. for nursing to have meds for nausea but delcined. Nurse made aware .   Sit to Stand   Type of Assistance Needed Incidental touching   Comment no AD using gait belt   Sit to Stand CARE Score 4   Bed-Chair Transfer   Type of Assistance Needed Incidental touching   Comment no AD using gait belt   Chair/Bed-to-Chair Transfer CARE Score 4   Transfer Bed/Chair/Wheelchair   Adaptive Equipment None  (gait belt)   Walk 10 Feet   Type of Assistance Needed Incidental touching   Comment CGA with no AD, has tendency to grab railing in the hallway   Walk 10 Feet CARE Score 4   Walk 50 Feet with Two Turns   Type of Assistance Needed Incidental touching   Comment CGA with no AD, has tendency to grab railing in the hallway   Walk 50 Feet with Two Turns CARE Score 4   Walk 150 Feet   Type of Assistance Needed Incidental touching;Physical assistance   Physical Assistance Level 25% or less   Comment CGA with no AD, has tendency to grab railing in the hallway   Walk 150 Feet CARE Score 3   Walking 10 Feet on Uneven Surfaces   Type of Assistance Needed Physical assistance;Incidental touching   Physical Assistance Level 25% or less   Comment with no AD outside surfaces inlcuding short ramp   Walking 10 Feet on Uneven Surfaces CARE Score 3   Ambulation   Primary Mode of Locomotion Prior to Admission Walk   Distance Walked (feet) 150 ft   Assist Device " Other  (no AD using gait belt)   Gait Pattern Decreased foot clearance;Slow Vicki;Step through;Improper weight shift;Narrow IRMA   Limitations Noted In Balance;Heel Strike   Walk Assist Level Contact Guard   Does the patient walk? 2. Yes   Wheel 50 Feet with Two Turns   Reason if not Attempted Activity not applicable   Wheel 50 Feet with Two Turns CARE Score 9   Wheel 150 Feet   Reason if not Attempted Activity not applicable   Wheel 150 Feet CARE Score 9   Wheelchair mobility   Does the patient use a wheelchair? 0. No   Curb or Single Stair   Style negotiated Curb   Type of Assistance Needed Physical assistance   Physical Assistance Level 25% or less   Comment R HHA outside curb step   1 Step (Curb) CARE Score 3   4 Steps   Type of Assistance Needed Physical assistance;Incidental touching   Physical Assistance Level 25% or less   Comment FF using L HR down, R HR up   4 Steps CARE Score 3   12 Steps   Type of Assistance Needed Physical assistance   Physical Assistance Level 25% or less   Comment FF using L HR down, R HR up   12 Steps CARE Score 3   Stairs   Type Stairs;Curb;Ramp   # of Steps 12   Weight Bearing Precautions Fall Risk   Assist Devices Single Rail   Therapeutic Interventions   Flexibility B hamstring and gastroc stretching   Balance ambulatory ball toss   Equipment Use   NuStep Level 1 X 10 mins B UE and LE   Assessment   Treatment Assessment Pt. engaged in 70 mins session and was able to tolerate above activities focusing on functional mobility without AD. Pt. did well with no major LOB noted but moved in a slow paced as he was very fatigue from lack of sleep last night. Pt. was brought outside and was very calm and no negative behaviors were noted. Pt. walked in unven surfaces outside with CGA/ min A but occasionally reaches for rail when going up and down the ramp. Pt. wanted to stop therapy after 70 mins to get back to his room and rest. Family and 1:1 were present at end of session .    Family/Caregiver Present Yes, daughter Jerica with her  and daughter, pt's sister , and 2 other family members who were present most of session to observe.   Problem List Decreased strength;Decreased endurance;Impaired balance;Decreased mobility;Decreased coordination;Decreased cognition;Impaired judgement;Decreased safety awareness   Barriers to Discharge Inaccessible home environment;Decreased caregiver support   PT Barriers   Functional Limitation Car transfers;Stair negotiation;Ramp negotiation;Standing;Transfers;Walking   Plan   Treatment/Interventions Functional transfer training;LE strengthening/ROM;Elevations;Therapeutic exercise;Endurance training;Patient/family training;Equipment eval/education;Bed mobility;Gait training   Discharge Recommendation   Rehab Resource Intensity Level, PT   (24/7 S)   Equipment Recommended   (LRAD)   PT Therapy Minutes   PT Time In 1300   PT Time Out 1410   PT Total Time (minutes) 70   PT Mode of treatment - Individual (minutes) 70   PT Mode of treatment - Concurrent (minutes) 0   PT Mode of treatment - Group (minutes) 0   PT Mode of treatment - Co-treat (minutes) 0   PT Mode of Treatment - Total time(minutes) 70 minutes   PT Cumulative Minutes 265

## 2025-05-12 PROBLEM — D69.6 THROMBOCYTOPENIA (HCC): Status: ACTIVE | Noted: 2025-05-12

## 2025-05-12 LAB
ALBUMIN SERPL BCG-MCNC: 3.4 G/DL (ref 3.5–5)
ALP SERPL-CCNC: 75 U/L (ref 34–104)
ALT SERPL W P-5'-P-CCNC: 103 U/L (ref 7–52)
ANION GAP SERPL CALCULATED.3IONS-SCNC: 5 MMOL/L (ref 4–13)
AST SERPL W P-5'-P-CCNC: 14 U/L (ref 13–39)
BASOPHILS # BLD AUTO: 0.01 THOUSANDS/ÂΜL (ref 0–0.1)
BASOPHILS NFR BLD AUTO: 0 % (ref 0–1)
BILIRUB SERPL-MCNC: 0.56 MG/DL (ref 0.2–1)
BUN SERPL-MCNC: 33 MG/DL (ref 5–25)
CALCIUM ALBUM COR SERPL-MCNC: 9.5 MG/DL (ref 8.3–10.1)
CALCIUM SERPL-MCNC: 9 MG/DL (ref 8.4–10.2)
CHLORIDE SERPL-SCNC: 101 MMOL/L (ref 96–108)
CO2 SERPL-SCNC: 30 MMOL/L (ref 21–32)
CREAT SERPL-MCNC: 1.11 MG/DL (ref 0.6–1.3)
EOSINOPHIL # BLD AUTO: 0.03 THOUSAND/ÂΜL (ref 0–0.61)
EOSINOPHIL NFR BLD AUTO: 1 % (ref 0–6)
ERYTHROCYTE [DISTWIDTH] IN BLOOD BY AUTOMATED COUNT: 11.7 % (ref 11.6–15.1)
GFR SERPL CREATININE-BSD FRML MDRD: 69 ML/MIN/1.73SQ M
GLUCOSE P FAST SERPL-MCNC: 127 MG/DL (ref 65–99)
GLUCOSE SERPL-MCNC: 120 MG/DL (ref 65–140)
GLUCOSE SERPL-MCNC: 127 MG/DL (ref 65–140)
GLUCOSE SERPL-MCNC: 182 MG/DL (ref 65–140)
GLUCOSE SERPL-MCNC: 184 MG/DL (ref 65–140)
GLUCOSE SERPL-MCNC: 98 MG/DL (ref 65–140)
HCT VFR BLD AUTO: 26.4 % (ref 36.5–49.3)
HGB BLD-MCNC: 9.3 G/DL (ref 12–17)
IMM GRANULOCYTES # BLD AUTO: 0.11 THOUSAND/UL (ref 0–0.2)
IMM GRANULOCYTES NFR BLD AUTO: 2 % (ref 0–2)
LYMPHOCYTES # BLD AUTO: 1.67 THOUSANDS/ÂΜL (ref 0.6–4.47)
LYMPHOCYTES NFR BLD AUTO: 32 % (ref 14–44)
MAGNESIUM SERPL-MCNC: 1.4 MG/DL (ref 1.9–2.7)
MCH RBC QN AUTO: 31.4 PG (ref 26.8–34.3)
MCHC RBC AUTO-ENTMCNC: 35.2 G/DL (ref 31.4–37.4)
MCV RBC AUTO: 89 FL (ref 82–98)
MONOCYTES # BLD AUTO: 0.27 THOUSAND/ÂΜL (ref 0.17–1.22)
MONOCYTES NFR BLD AUTO: 5 % (ref 4–12)
NEUTROPHILS # BLD AUTO: 3.07 THOUSANDS/ÂΜL (ref 1.85–7.62)
NEUTS SEG NFR BLD AUTO: 60 % (ref 43–75)
NRBC BLD AUTO-RTO: 1 /100 WBCS
PHOSPHATE SERPL-MCNC: 2.7 MG/DL (ref 2.3–4.1)
PLATELET # BLD AUTO: 117 THOUSANDS/UL (ref 149–390)
PMV BLD AUTO: 10 FL (ref 8.9–12.7)
POTASSIUM SERPL-SCNC: 4.3 MMOL/L (ref 3.5–5.3)
PROT SERPL-MCNC: 5.4 G/DL (ref 6.4–8.4)
RBC # BLD AUTO: 2.96 MILLION/UL (ref 3.88–5.62)
SODIUM SERPL-SCNC: 136 MMOL/L (ref 135–147)
WBC # BLD AUTO: 5.16 THOUSAND/UL (ref 4.31–10.16)

## 2025-05-12 PROCEDURE — 85025 COMPLETE CBC W/AUTO DIFF WBC: CPT | Performed by: NURSE PRACTITIONER

## 2025-05-12 PROCEDURE — 99232 SBSQ HOSP IP/OBS MODERATE 35: CPT | Performed by: NURSE PRACTITIONER

## 2025-05-12 PROCEDURE — 97129 THER IVNTJ 1ST 15 MIN: CPT

## 2025-05-12 PROCEDURE — 99232 SBSQ HOSP IP/OBS MODERATE 35: CPT | Performed by: INTERNAL MEDICINE

## 2025-05-12 PROCEDURE — 97112 NEUROMUSCULAR REEDUCATION: CPT

## 2025-05-12 PROCEDURE — 99232 SBSQ HOSP IP/OBS MODERATE 35: CPT | Performed by: STUDENT IN AN ORGANIZED HEALTH CARE EDUCATION/TRAINING PROGRAM

## 2025-05-12 PROCEDURE — 84443 ASSAY THYROID STIM HORMONE: CPT | Performed by: NURSE PRACTITIONER

## 2025-05-12 PROCEDURE — 80053 COMPREHEN METABOLIC PANEL: CPT | Performed by: NURSE PRACTITIONER

## 2025-05-12 PROCEDURE — 97530 THERAPEUTIC ACTIVITIES: CPT

## 2025-05-12 PROCEDURE — 97130 THER IVNTJ EA ADDL 15 MIN: CPT

## 2025-05-12 PROCEDURE — 84100 ASSAY OF PHOSPHORUS: CPT | Performed by: INTERNAL MEDICINE

## 2025-05-12 PROCEDURE — 83735 ASSAY OF MAGNESIUM: CPT | Performed by: INTERNAL MEDICINE

## 2025-05-12 PROCEDURE — 84439 ASSAY OF FREE THYROXINE: CPT | Performed by: NURSE PRACTITIONER

## 2025-05-12 PROCEDURE — 82948 REAGENT STRIP/BLOOD GLUCOSE: CPT

## 2025-05-12 RX ORDER — LACTULOSE 10 G/15ML
20 SOLUTION ORAL DAILY PRN
Status: DISCONTINUED | OUTPATIENT
Start: 2025-05-12 | End: 2025-05-14

## 2025-05-12 RX ORDER — INSULIN LISPRO 100 [IU]/ML
6 INJECTION, SOLUTION INTRAVENOUS; SUBCUTANEOUS
Status: DISCONTINUED | OUTPATIENT
Start: 2025-05-12 | End: 2025-05-21

## 2025-05-12 RX ADMIN — POLYETHYLENE GLYCOL 3350 17 G: 17 POWDER, FOR SOLUTION ORAL at 08:19

## 2025-05-12 RX ADMIN — INSULIN LISPRO 4 UNITS: 100 INJECTION, SOLUTION INTRAVENOUS; SUBCUTANEOUS at 08:19

## 2025-05-12 RX ADMIN — INSULIN LISPRO 6 UNITS: 100 INJECTION, SOLUTION INTRAVENOUS; SUBCUTANEOUS at 17:39

## 2025-05-12 RX ADMIN — INSULIN LISPRO 2 UNITS: 100 INJECTION, SOLUTION INTRAVENOUS; SUBCUTANEOUS at 11:30

## 2025-05-12 RX ADMIN — LACTULOSE 20 G: 20 SOLUTION ORAL at 17:38

## 2025-05-12 RX ADMIN — HEPARIN SODIUM 5000 UNITS: 5000 INJECTION INTRAVENOUS; SUBCUTANEOUS at 13:36

## 2025-05-12 RX ADMIN — DEXAMETHASONE 2 MG: 2 TABLET ORAL at 21:41

## 2025-05-12 RX ADMIN — INSULIN LISPRO 2 UNITS: 100 INJECTION, SOLUTION INTRAVENOUS; SUBCUTANEOUS at 17:38

## 2025-05-12 RX ADMIN — ALLOPURINOL 300 MG: 300 TABLET ORAL at 08:18

## 2025-05-12 RX ADMIN — PANTOPRAZOLE SODIUM 40 MG: 40 TABLET, DELAYED RELEASE ORAL at 06:02

## 2025-05-12 RX ADMIN — CHLORHEXIDINE GLUCONATE 15 ML: 1.2 SOLUTION ORAL at 08:19

## 2025-05-12 RX ADMIN — DOCUSATE SODIUM 100 MG: 100 CAPSULE, LIQUID FILLED ORAL at 17:38

## 2025-05-12 RX ADMIN — QUETIAPINE 50 MG: 25 TABLET ORAL at 21:41

## 2025-05-12 RX ADMIN — QUETIAPINE 12.5 MG: 25 TABLET ORAL at 11:30

## 2025-05-12 RX ADMIN — SENNOSIDES 17.2 MG: 8.6 TABLET, FILM COATED ORAL at 11:30

## 2025-05-12 RX ADMIN — CHLORHEXIDINE GLUCONATE 15 ML: 1.2 SOLUTION ORAL at 21:41

## 2025-05-12 RX ADMIN — Medication 6 MG: at 21:41

## 2025-05-12 RX ADMIN — HEPARIN SODIUM 5000 UNITS: 5000 INJECTION INTRAVENOUS; SUBCUTANEOUS at 21:41

## 2025-05-12 RX ADMIN — ATORVASTATIN CALCIUM 20 MG: 20 TABLET, FILM COATED ORAL at 08:18

## 2025-05-12 RX ADMIN — INSULIN GLARGINE 10 UNITS: 100 INJECTION, SOLUTION SUBCUTANEOUS at 21:42

## 2025-05-12 RX ADMIN — METFORMIN ER 500 MG 1000 MG: 500 TABLET ORAL at 08:19

## 2025-05-12 RX ADMIN — HEPARIN SODIUM 5000 UNITS: 5000 INJECTION INTRAVENOUS; SUBCUTANEOUS at 06:02

## 2025-05-12 RX ADMIN — INSULIN LISPRO 6 UNITS: 100 INJECTION, SOLUTION INTRAVENOUS; SUBCUTANEOUS at 11:30

## 2025-05-12 RX ADMIN — DOCUSATE SODIUM 100 MG: 100 CAPSULE, LIQUID FILLED ORAL at 08:19

## 2025-05-12 RX ADMIN — DEXAMETHASONE 2 MG: 2 TABLET ORAL at 08:18

## 2025-05-12 NOTE — PROGRESS NOTES
"OT daily tx note     05/12/25 1220   Pain Assessment   Pain Assessment Tool 0-10   Pain Score No Pain   Restrictions/Precautions   Precautions 1:1;Bed/chair alarms;Cognitive;Fall Risk;Impulsive;Supervision on toilet/commode;Other (comment)  (chemo prec)   Weight Bearing Restrictions No   ROM Restrictions No   Lifestyle   Autonomy \"I need to go to the kitchen b/c I'm leaving for work soon\"   Eating   Type of Assistance Needed Set-up / clean-up   Physical Assistance Level No physical assistance   Comment seated in recliner   Eating CARE Score 5   Cognition   Overall Cognitive Status Impaired   Arousal/Participation Alert;Cooperative   Attention Attends with cues to redirect   Orientation Level Oriented to person;Disoriented to place;Disoriented to time;Disoriented to situation   Memory Decreased long term memory;Decreased recall of biographical information;Decreased short term memory;Decreased recall of recent events;Decreased recall of precautions   Following Commands Follows one step commands inconsistently   Comments Pt completed Domenic Cognitive Assessment (MoCA) version 8.1. extensive time needed to complete assessment as pt easily distracted/short attention span and freq redirecting. Pt scored overall 3 / 30  indicating a severe cognitive deficit. Overall pt presenting w/ severe cog impairments and therefore rec 24/7 sup for home. Based off of results, pt would need assistance for higher level cog tasks such as IADLs and w/ more basic tasks in terms of sequencing initiation, insight, and safety awareness. Pt cont to only be oriented to person despite referring to orientation calendar. Working w/ ST, OT has attempted to problem solve ways to help pt orient self but dec ability to initiate and problem solve has inhibited carryover. Pt cont to req 1:1 due to restlessness and some behavioral/agitation. Pt will need eye-on assistance for VC and redirection as needed. FT will be scheduled w/ dtr and wife about rec " for home as pt appears to be making some progress functionally but poor carryover w/ cog tasks, safety awareness.    Visuospatial/executive: 0/5   Namin/3   Memory:  (worth no points) 2/5 (1st trial), 3/5 (2nd trial)  Attention:  1 6   Language: 1 / 3   Abstraction: 0 / 2   Delayed recall: 0 / 5    Memory Index Score (MIS): 15  Orientation: 0 / 6     +1 point given for less than or equal to 12 years of education? No      Total score 3/30, indicating a severe cognitive deficit.     MoCA certified rater ID: [id number]    Jr Montoya, MS OTR/L    Activity Tolerance   Activity Tolerance Patient limited by fatigue   Assessment   Treatment Assessment Pt engaged in skilled OT session with focus on Functional Cognition, Functional Attention, Assessment of Cognitive function, Short Term memory, and MOCA. Pt is limited by weakness, impaired balance, decreased endurance, increased fall risk, decreased ADLS, decreased IADLS, decreased activity tolerance, decreased safety awareness, impaired judgement, and decreased cognition. MoCA completed during session and details can be found above. Upcoming sessions to focus on repetitive cog retraining, ADL retraining, safety awareness edu, simple functional cog tasks, endurance training, balance retraining. FT to be scheduled to form DC plan. OT services are warranted to address above barriers.   Prognosis Fair   Problem List Decreased strength;Decreased endurance;Impaired balance;Decreased mobility;Decreased coordination;Decreased cognition;Impaired judgement;Decreased safety awareness   Barriers to Discharge Inaccessible home environment;Decreased caregiver support   Plan   Treatment/Interventions ADL retraining;Functional transfer training;Therapeutic exercise;Endurance training   Progress Progressing toward goals   OT Therapy Minutes   OT Time In 1220   OT Time Out 1320   OT Total Time (minutes) 60   OT Mode of treatment - Individual (minutes) 60   OT Mode of  treatment - Concurrent (minutes) 0   OT Mode of treatment - Group (minutes) 0   OT Mode of treatment - Co-treat (minutes) 0   OT Mode of Treatment - Total time(minutes) 60 minutes   OT Cumulative Minutes 345   Therapy Time missed   Time missed? No

## 2025-05-12 NOTE — PROGRESS NOTES
05/12/25 1400   Pain Assessment   Pain Assessment Tool 0-10   Pain Score No Pain   Restrictions/Precautions   Precautions 1:1;Bed/chair alarms;Cognitive;Fall Risk;Impulsive;Supervision on toilet/commode;Visual deficit  (chemo precautions)   Comprehension   Comprehension (FIM) 3 - Understands basic info/conversation 50-74% of time   Expression   Expression (FIM) 3 - Expresses basic info/needs 50-74% of time   Social Interaction   Social Interaction (FIM) 5 - Interacts appropriately with others 90% of time   Problem Solving   Problem solving (FIM) 1 - Needs restraint at all times   Memory   Memory (FIM) 1 - Patient does not effectively recognize, recall/perform at all   Speech/Language/Cognition Assessmetn   Treatment Assessment In PM session, pt was asleep upon arrival, but was able to be awakened by voice. Also pt's spouse, Naldo was present at bedside, to where SLP introduced self and briefly provided a review of services. For this session, SLP had pt sit EOB to engage in completing parquetry board task. SLP trialing this for session today as this did not use written information but more so targeting color-shape matching to elicit a design which was both simple puzzle and then increased to more complex puzzle which pt had to manipulate puzzle pieces to fill into the areas. It was noted that pt was able to name 5 out of 6 colors presented, noting difficulty in naming purple, but when SLP provided verbal binary choices, pt did correctly ID. It was noted that w/ increased time, pt was able to complete the initial puzzle accurately, noting ability to manipulate some pieces towards correct placement. Pt also demonstrated appropriate problem solving w/ colors and the 2 different shapes associated and matching correct colors/shapes. However, as SLP then introduced more complex parquetry puzzle, again needing pt to manipulate more than 2 shapes to fit the puzzle, pt did need moderate cues to determine how to complete  "this. Pt was aware that 1 single piece was not the same size needed, but significant difficulty in determining how to  place 2 puzzle pieces to fit the shape. This occurred x5 for the corner pieces as well as the middle piece which pt had difficulty recognizing how all puzzle pieces were to fit into that area. As the second puzzle progressed, pt did verbalize being \"tired\" but he did work through task and then did lay self back accordingly into bed once completed. At this time, pt will continue to benefit from continued SLP services targeting functional cognitive skills in hopes for decreasing caregiver burden over time.   SLP Therapy Minutes   SLP Time In 1400   SLP Time Out 1430   SLP Total Time (minutes) 30   SLP Mode of treatment - Individual (minutes) 30   SLP Mode of treatment - Concurrent (minutes) 0   SLP Mode of treatment - Group (minutes) 0   SLP Mode of treatment - Co-treat (minutes) 0   SLP Mode of Treatment - Total time(minutes) 30 minutes   SLP Cumulative Minutes 240   Therapy Time missed   Time missed? No       "

## 2025-05-12 NOTE — PCC CARE MANAGEMENT
Pts dc moved to Friday or Saturday to accommodate chemo treatment which was on hold due to sepsis. Pt is on PO abx and is set up with [facility] for pt ot and speech services. Following to finalize dc plan.

## 2025-05-12 NOTE — PCC OCCUPATIONAL THERAPY
05/12/25  Pt is demonstrating fair progress with occupational therapy and is progressing toward long term goals for ADL, IADL, and functional transfers/mobility. Pts long term goals for ADLs are sup w/ no assistive device. Pt is currently Partial/moderate assistance  for ADLs. Pt continues to present with impairments in activity tolerance, endurance, standing balance/tolerance, UE strength, memory, insight, safety , judgement , attention , sequencing , task initiation , and task termination . Occupational performance remains limited by fatigue, impulsivity, decreased caregiver support, risk for falls, and home environment. Family training/education will be required prior to D/C. Pt will continue to benefit from skilled acute rehab OT services to address above mentioned barriers and maximize functional independence in baseline areas of occupation to meet established treatment goals with overall decreased burden of care. Plan of care to continue to focus on ADL Retraining , LB Dressing, UB dressing, Functional Transfers, Functional Cognition, Functional Attention, Standing tolerance, Standing balance , Gross motor strengthening , Family training/education, Energy conservation training/education, healthy coping education, Leisure and social pursuits, and community re-integration. Goals for the upcoming week are: setup FT w/ family and explain reasoning for 24/7 SUP level rec for home    Anticipate Discharge date to be set.     05/21/25  Pt is demonstrating good progress with occupational therapy and is progressing toward long term goals for ADL, IADL, and functional transfers/mobility. Pts long term goals for ADLs are SUP with Rolling Walker and no assistive device. Pt is currently Supervision or touching assistance-  for ADLs. Pt continues to present with impairments in activity tolerance, endurance, sitting balance/tolerance, UE strength, arousal, memory, insight, safety , judgement , attention , and sequencing .  Occupational performance remains limited by fatigue, pain, impulsivity, decreased caregiver support, and risk for falls. Family training/education already completed prior to D/C. Pt will continue to benefit from skilled acute rehab OT services to address above mentioned barriers and maximize functional independence in baseline areas of occupation to meet established treatment goals with overall decreased burden of care. Plan of care to continue to focus on ADL Retraining , LB Dressing, UB dressing, Functional Transfers, Functional Cognition, Functional Attention, Standing tolerance, Standing balance , Gross motor strengthening , Energy conservation training/education, healthy coping education, Leisure and social pursuits, and community re-integration.     DC pending medical stability    05/27/25  Pt has made good progress with occupational therapy and has achieved highest level of independence w/ long term goals for ADL, IADL, and functional transfers/mobility. Pt is currently Supervision or touching assistance-  for ADLs and Supervision or touching assistance-  for functional mobility w/ RW. DC pending medical stability

## 2025-05-12 NOTE — PROGRESS NOTES
05/12/25 0953   Pain Assessment   Pain Assessment Tool 0-10   Pain Score No Pain   Restrictions/Precautions   Precautions 1:1;Bed/chair alarms;Cognitive;Fall Risk;Impulsive;Supervision on toilet/commode  (chemo precautions)   Weight Bearing Restrictions No   ROM Restrictions No   Cognition   Overall Cognitive Status Impaired   Arousal/Participation Alert;Cooperative   Attention Attends with cues to redirect   Orientation Level Oriented to person;Disoriented to place;Disoriented to time;Disoriented to situation   Memory Decreased long term memory;Decreased recall of biographical information;Decreased short term memory;Decreased recall of recent events;Decreased recall of precautions   Following Commands Follows one step commands inconsistently   Roll Left and Right   Type of Assistance Needed Supervision   Roll Left and Right CARE Score 4   Sit to Lying   Type of Assistance Needed Supervision   Sit to Lying CARE Score 4   Lying to Sitting on Side of Bed   Type of Assistance Needed Supervision   Lying to Sitting on Side of Bed CARE Score 4   Sit to Stand   Type of Assistance Needed Supervision   Comment no AD   Sit to Stand CARE Score 4   Bed-Chair Transfer   Type of Assistance Needed Supervision   Comment CS with no AD   Chair/Bed-to-Chair Transfer CARE Score 4   Transfer Bed/Chair/Wheelchair   Adaptive Equipment None   Walk 10 Feet   Type of Assistance Needed Supervision   Comment CS no AD   Walk 10 Feet CARE Score 4   Walk 50 Feet with Two Turns   Type of Assistance Needed Incidental touching;Supervision   Comment CS/CGA with no AD   Walk 50 Feet with Two Turns CARE Score 4   Walk 150 Feet   Type of Assistance Needed Incidental touching;Supervision   Comment CS/CGA with no AD   Walk 150 Feet CARE Score 4   Ambulation   Primary Mode of Locomotion Prior to Admission Walk   Distance Walked (feet) 150 ft  (x4)   Gait Pattern Inconsistant Vicki;Decreased foot clearance;Narrow IRMA;Shuffle;Improper weight shift    Limitations Noted In Balance;Endurance;Heel Strike;Safety;Speed;Strength;Swing   Provided Assistance with: Direction   Walk Assist Level Close Supervision;Contact Guard   Does the patient walk? 2. Yes   Wheelchair mobility   Does the patient use a wheelchair? 0. No   Curb or Single Stair   Style negotiated Single stair   Type of Assistance Needed Incidental touching;Adaptive equipment   Comment RHR ascending, LHR descending on FF   1 Step (Curb) CARE Score 4   4 Steps   Type of Assistance Needed Incidental touching;Adaptive equipment   Comment RHR ascending, LHR descending on FF   4 Steps CARE Score 4   12 Steps   Type of Assistance Needed Incidental touching;Adaptive equipment   Comment RHR ascending, LHR descending on FF   12 Steps CARE Score 4   Stairs   Type Stairs   # of Steps 12   Weight Bearing Precautions Fall Risk   Assist Devices Single Rail   Toilet Transfer   Type of Assistance Needed Supervision;Incidental touching   Comment CS/CGA   Toilet Transfer CARE Score 4   Toilet Transfer   Surface Assessed Standard Toilet   Therapeutic Interventions   Neuromuscular Re-Education Amb ball toss with CGA fwd/bwds, no overt LOB seen. Pt did have poor processing of task initially.   Equipment Use   NuStep L2 x10 min BLE/UE   Assessment   Treatment Assessment Start of session pt was sleeping in bed with 1:1 present. He was agreeable to session once awake. Pt had a purwik still donned and nursing was called to take it off. Pt cont to be limited by poor safety awareness, decreased cognition, decreased processing, decreased caregiver support and poor righting reactions. Pt remains a high fall risk 2/2 to this limitations. Pt was able to maintain CS/CGA with gait, CGA on a FF with single HR, S with STS transfers and S with bed mobility. Pt requires constant VC's for direction and task management 2/2 poor cognition and decreased carryover. Pt is recommended 24 S at home 2/2 decreased cognition and current changes in pt's  abilities. Pt will cont POC as tolerated with cont focus on gait, balance, coordination, duel tasking, righting reactions and stair management to decrease burden of care and decrease fall risk.   Problem List Decreased strength;Decreased endurance;Impaired balance;Decreased mobility;Decreased coordination;Decreased cognition;Impaired judgement;Decreased safety awareness   PT Barriers   Functional Limitation Car transfers;Stair negotiation;Transfers;Walking;Standing   Plan   Treatment/Interventions Functional transfer training;LE strengthening/ROM;Therapeutic exercise;Endurance training;Cognitive reorientation;Patient/family training;Gait training   Progress Progressing toward goals   PT Therapy Minutes   PT Time In 0930   PT Time Out 1030   PT Total Time (minutes) 60   PT Mode of treatment - Individual (minutes) 60   PT Mode of treatment - Concurrent (minutes) 0   PT Mode of treatment - Group (minutes) 0   PT Mode of treatment - Co-treat (minutes) 0   PT Mode of Treatment - Total time(minutes) 60 minutes   PT Cumulative Minutes 325   Therapy Time missed   Time missed? No

## 2025-05-12 NOTE — PROGRESS NOTES
"   05/12/25 1030   Pain Assessment   Pain Assessment Tool 0-10   Pain Score No Pain   Restrictions/Precautions   Precautions 1:1;Bed/chair alarms;Cognitive;Fall Risk;Impulsive;Supervision on toilet/commode;Visual deficit  (chemo precautions)   Comprehension   Comprehension (FIM) 3 - Understands basic info/conversation 50-74% of time   Expression   Expression (FIM) 3 - Expresses basic info/needs 50-74% of time   Social Interaction   Social Interaction (FIM) 5 - Interacts appropriately with others 90% of time   Problem Solving   Problem solving (FIM) 1 - Needs direction nearly all the time   Memory   Memory (FIM) 1 - Patient does not effectively recognize, recall/perform at all   Speech/Language/Cognition Assessmetn   Treatment Assessment Pt was awake, alert and in recliner upon arrival for session to where pt did appear to be more fatigued, but overall engagement for session was appropriate. SLP using eternal visual aids for orientation to place, date. It was noted that pt did not have his glasses for this portion which still appears to impact some ability to reading skills. This was noted for the items which were located on the wall. However, when pt did have the calendar present at bedside table, pt was able to visually scan and read those items which were much closer for pt. Pt was able to ID correct date (as days were crossed off to GORDO and date) w/ increased time. As for recall of events from over the weekend, pt was noted to have significant decreased recollection of children visiting. Pt even asking SLP back, \"they were here?\" SLP provided pt w/ education that per chart review from SLP whom pt worked w/ over the weekend, it was noted that all 3 children were present. SLP even using the pictures as well as the LT biographical memory book which pt's Elizabeth ordoñez had provided. SLP using the photos to have pt provide the names of family members, which pt was most accurate w/ all children, but also son-in-law. Pt " did have more difficulty in recalling son's girlfriend's name and granddtr's name. However, w/ phonemic cues, pt was able to elicit both of their names. Otherwise, SLP engaged in open ended situational what questions (ie: what would you do if you had a headache?). It was noted that pt was exhibiting difficulty in providing an answer which logically fit the question, except for 1 out of 6 items presented. SLP even providing pt probing questions to elicit more information vs more realistic responses to questions presented but remained unsuccessful. Again, pt noted to be more fatigued as this task progress, also likely impacting skills at this time. Currently pt to benefit from skilled SLP services targeting functional cognitive skills in hopes to decrease caregiver burden over time.   SLP Therapy Minutes   SLP Time In 1030   SLP Time Out 1100   SLP Total Time (minutes) 30   SLP Mode of treatment - Individual (minutes) 30   SLP Mode of treatment - Concurrent (minutes) 0   SLP Mode of treatment - Group (minutes) 0   SLP Mode of treatment - Co-treat (minutes) 0   SLP Mode of Treatment - Total time(minutes) 30 minutes   SLP Cumulative Minutes 210   Therapy Time missed   Time missed? No

## 2025-05-12 NOTE — PCC PHYSICAL THERAPY
Alexis is a 63 y/o male with PMH including DM, HLD, HTN, GERD, liver disease and sleep apnea, who presented to hospital on 3/29 for increased confusion. 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         5/12:Pt cont to be limited by poor safety awareness, decreased cognition, decreased processing, decreased caregiver support and poor righting reactions. Pt remains a high fall risk 2/2 to this limitations. Pt was able to maintain CS/CGA with gait, CGA on a FF with single HR, S with STS transfers and S with bed mobility. Pt requires constant VC's for direction and task management 2/2 poor cognition and decreased carryover. Pt is recommended 24 S at home 2/2 decreased cognition and current changes in pt's abilities. Pt will cont POC as tolerated with cont focus on gait, balance, coordination, duel tasking, righting reactions and stair management to decrease burden of care and decrease fall risk. Currently planning on family/team meeting 5/14 at 10am to review d/c planning with family.     5/15: Alexis cont to fluctuate between CS-Maddy level based upon variations in fatigue and cognition. He cont to have deficits listed above with sequencing, problem solving, executive function, language, balance and righting reactions, therefore cont to recommend CS-Maddy at home. Family meeting was held today with pt's wife and his dtr and current plan is to do FT with wife this Saturday afternoon and she will stay over to see what  care he needs over night. RN is aware of this plan to provide edu to her over night as needed. Based upon FT and how much more is needed, planning on dc home sometime next week. Recommending he is not home alone due to cognitive deficits. Family was asking about DME;  will trial with rollator to see how he does with this device to provide appropriate recommendations for home.     5/21 pt has progressed t a S level with and without an AD. ( RW) He is currently doing steps with one rail  with S - wife has been present for  most recent sessions and has participated  ( in training and hands on)  in  walking and steps with no issues . Rec home therapy  and 24/7 S /A  by wife  ( unsure of extra  support as  wife may need time out of the house )   as per  therapy status -agreeable to  dc in  next couple of days

## 2025-05-12 NOTE — ASSESSMENT & PLAN NOTE
Lab Results   Component Value Date    HGBA1C 6.6 (H) 02/22/2025       Recent Labs     05/11/25  1600 05/11/25  2113 05/12/25  0605 05/12/25  1105   POCGLU 255* 210* 120 184*       Blood Sugar Average: Last 72 hrs:  (P) 181    - Metformin 1000mg daily  - Lantus 10u daily with Lispro 8u TID and SSI  - Monitor accuchecks while on steroids  - Management per IM

## 2025-05-12 NOTE — PROGRESS NOTES
Progress Note - PMR   Name: Alexis Dennison 64 y.o. 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  > Started on high dose steroids and methotrexate every 2 weeks for 4 weeks (C1 received 4/18, C2 received 5/2) then maintenance every 4 weeks with Rituximab weekly for 8 weeks (1st dose Thursday 4/24 and second 5/3)  > Admitted to [facility] with ongoing cognitive deficits and delirium    - Continue Decadron 2 mg Q12 on taper  - Continue MTX + Rituximab as per Hem/onc  - Daily methotrexate levels  - Hem/Onc following  - NSGY following  - SLP for cognition  Type 2 diabetes mellitus with hyperglycemia, without long-term current use of insulin (HCC)  Lab Results   Component Value Date    HGBA1C 6.6 (H) 02/22/2025       Recent Labs     05/11/25  1600 05/11/25  2113 05/12/25  0605 05/12/25  1105   POCGLU 255* 210* 120 184*       Blood Sugar Average: Last 72 hrs:  (P) 181    - Metformin 1000mg daily  - Lantus 10u daily with Lispro 8u TID and SSI  - Monitor accuchecks while on steroids  - Management per IM  HTN, goal below 130/80  > Blood pressure controlled off antihypertensives    - Monitor VS  - Management per IM  Mixed hyperlipidemia  - Continue atorvastatin 10 mg daily  Thyroid nodule    Pulmonary nodule  > CTA 3/29: nonspecific 4 mm RLL pulmonary nodule   - follow up outpatient for repeat imaging in 3 months  Liver lesion  > CTA 3/29-  nonspecific 12mm hypodense right hepatic lesion, recommended MRI abdomen  > MRI abdomen 3/30- simple right hepatic lobe cyst    - Follow up outpatient for monitoring  Ambulatory dysfunction  - Fall precautions  - PT/OT  Encephalopathy  > Patient has been pleasantly confused for months in the setting of brain mass.  > acutely worsened due to UTI and bacteremia. S/p 7 day course of antibx  > Continues to lack insight to his current situation and is severely cognitively impaired.  He does not get agitated for long periods of time and is mostly confused and impulsive.  > A&O 1 on ARC admission    - Continue seroquel 12.5mg at noon with 50mg HS  - PRN Zyprexa 2.5mg IM as needed for agitation  -Overstimulation precautions, frequent re-orientation, re-direction, re-assurance  -Sleep log and agitation monitoring if needed  -Optimize sleep-wake cycle  -Limit sedating medications when possible  - Ensure optimal management electrolytes, nutrition, and hydration  - Ensure optimal bowel/bladder management  - Ensure optimal pain management   -Patient/family/caregiver education and training   -Continue 1:1 due to overall safety, impulsivity, lack of safety awareness and poor insight.  -Fall precautions with frequent rounding; proactive toileting program, patient should not be unattended in bathroom      LETY (acute kidney injury) (HCC)  > Baseline Cr 0.9  > Recently 1.3 improved from before- peak 2.7  > Now s/p bicarb    - IVF per IM/Nephro  - Management at discretion of nephro   E. coli UTI  > Pet met sepsis criteria on 4/29 with confusion and agitation. UA was positive. Urine culture and blood cultures showed Ecoli sensitive to cephalosporins.  > ID consulted and pt completed 7d course of ceftriaxone  > Approved to restart chemo 5/2.    - Monitor for any recurrence of agitation or new fevers/signs of infection  Impaired mobility and activities of daily living  Patient was evaluated by the rehabilitation team MD and an appropriate  candidate for acute inpatient rehabilitation program at this time.  The patient will tolerate 3 hours/day 5 to 7 days/week of intensive physical, occupational in order to obtain goals for community discharge  Due to the patient's functional Compared to their baseline level of function in addition to their ongoing medical needs, the patient would benefit from daily supervision from a rehabilitation physician as well as rehabilitation nursing to implement and adjust the medical as well as functional plan of care in order to meet the patient's goals.  HTN (hypertension)  Had been on losartan but holding at this time was her blood pressures are good  Transaminitis  CMP checked on 5/9 and AST is down to 57 from 111 and ALT to 281 from 322  ALT and AST were elevated  Hyponatremia  Sodium improved from 132 up to 136 today on 5/12  Thrombocytopenia (HCC)  Platelets down to 100 17K will need to continue monitoring while on chemotherapy  Sepsis (HCC)    E coli bacteremia    Leukopenia    At risk for acid-base imbalance    Electrolyte imbalance risk      Subjective   Patient is a 64-year-old male with history of type 2 diabetes, GERD, hyperlipidemia and hypertension on nonalcoholic fatty liver disease and sleep apnea who presents to [facility] on 3/29/2025 for increasing confusion. He had been recently diagnosed with a brain mass and family reported that he had been having increased symptoms including forgetfulness over the past several months. Initial workup was negative however he was seen in the ER on 3/24 and a CT of the head was concerning for brain lesion. 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 Additional areas of enhancement in the basal ganglia on the left as well as the left cerebral peduncle/left quirino. There was an initial plan to have an outpatient neurosurgery evaluation but because of his worsening symptoms  including visual deficits he would came to the ER sooner. A CT of the chest abdomen pelvis was completed on 3/29 showing a right lower lobe pulmonary nodule and nonspecific hepatic lesion and MRI was completed revealing a simple right hepatic lobe cyst. A diagnostic lumbar puncture was completed on 3/31 suspicious for CNS lymphoma and a repeat CT completed on 4/3 showed worsening hydrocephalus and MRI on 4/11 showed disease progression. Biopsy was done and EVD placed on 4/14 and the patient self extubated postprocedure. The preliminary results from the biopsy were indicative of a small round blue cell tumor suggestive of non-Hodgkin's lymphoma. He was started on high-dose steroids, methotrexate and rituximab by oncology but did have a complicated course including LETY, intermittent agitated delirium requiring medications as well as continuous observation as well as an E. coli UTI with bacteremia status post course of antibiotics. The EVD was removed on 4/26.     Chief Complaint: f/u non-traumatic brain injury and f/u ambulatory dysfunction    Interval: Patient seen and evaluated in therapy gym without any acute issues.  Continues on a one-to-one continuous observation for cognitive deficits, poor insight and limited safety awareness.  Over the weekend appeared some limited understanding by the family of his current situation versus the ongoing therapies and treatments.  Would benefit greatly from a family meeting which we are attempting to set up for tomorrow in order to discuss his overall functional prognosis especially from a cognitive standpoint and that he will likely need continued cognitive support 24 hours/day.  Labs obtained today including CBC and BMP and overall increased hemoglobin up to 9.3 and improved sodium to 136 but platelets did drop down to 100 17K    Objective :  Temp:  [97.9 °F (36.6 °C)-98.3 °F (36.8 °C)] 98 °F (36.7 °C)  HR:  [74-91] 74  BP: (116-133)/(71-85) 116/71  Resp:  [16-18] 18  SpO2:   [98 %-100 %] 100 %  O2 Device: None (Room air)    Functional Update:  Mobility: Min assist to contact-guard for most mobility tasks.  Transfers: Transfers contact-guard  ADLs: Min assist for toileting supervision for footwear and lower body ADLs.  Cognitive deficits with severe memory and problem-solving deficits and more moderate comprehension expression deficits.    Physical Exam  Vitals reviewed.   Constitutional:       Appearance: Normal appearance.   HENT:      Head:      Comments: Anterior cranial scalp slight clean and intact     Right Ear: External ear normal.      Left Ear: External ear normal.      Nose: Nose normal. No rhinorrhea.      Mouth/Throat:      Mouth: Mucous membranes are moist.      Pharynx: Oropharynx is clear.   Eyes:      General: No scleral icterus.  Cardiovascular:      Rate and Rhythm: Normal rate.   Pulmonary:      Effort: Pulmonary effort is normal. No respiratory distress.      Breath sounds: Normal breath sounds.   Abdominal:      General: There is no distension.      Palpations: Abdomen is soft.   Musculoskeletal:      Right lower leg: No edema.      Left lower leg: No edema.   Skin:     General: Skin is warm and dry.   Neurological:      Mental Status: He is alert.      Motor: No weakness.      Gait: Gait abnormal.      Comments: Oriented to self only.  Limited insight and safety awareness   Psychiatric:         Mood and Affect: Mood normal.         Behavior: Behavior normal.           Scheduled Meds:  Current Facility-Administered Medications   Medication Dose Route Frequency Provider Last Rate    acetaminophen  650 mg Oral Q6H PRN Crispin Arana MD      allopurinol  300 mg Oral Daily Crispin Arana MD      alteplase  2 mg Intracatheter Q1MIN PRN Crispin Arana MD      alteplase  2 mg Intracatheter Q1MIN PRN Magali Lee MD      aluminum-magnesium hydroxide-simethicone  30 mL Oral Q4H PRN Crispin Arana MD      atorvastatin  20 mg Oral Daily Crispin Arana MD      bisacodyl   10 mg Rectal Daily PRN Jessica T Arreola, DO      calcium carbonate  1,000 mg Oral Daily PRN Crispin Arana MD      chlorhexidine  15 mL Mouth/Throat Q12H [facility] Crispin Arana MD      dexamethasone  2 mg Oral Q12H [facility] Crispin Arana MD      Followed by    [START ON 5/15/2025] dexamethasone  1 mg Oral Q12H CAIT Arana MD      docusate sodium  100 mg Oral BID Jessica Arreola, DO      heparin (porcine)  5,000 Units Subcutaneous Q8H [facility] Crispin Arana MD      insulin glargine  10 Units Subcutaneous HS Crispin Arana MD      insulin lispro  2-12 Units Subcutaneous 4x Daily (AC & HS) Crispin Arana MD      insulin lispro  6 Units Subcutaneous TID With Meals PATI Porras      melatonin  6 mg Oral HS Crispin Arana MD      metFORMIN  1,000 mg Oral Daily Crispin Arana MD      OLANZapine  5 mg Intramuscular BID PRN Crispin Arana MD      ondansetron  4 mg Intravenous Q6H PRN Crispin Arana MD      oxyCODONE  2.5 mg Oral Q4H PRN Crispin Arana MD      Or    oxyCODONE  5 mg Oral Q4H PRN Crispin Arana MD      pantoprazole  40 mg Oral Early Morning Crispin Arana MD      polyethylene glycol  17 g Oral Daily PRN Jessica Arreola, DO      QUEtiapine  12.5 mg Oral Daily Crispin Arana MD      QUEtiapine  50 mg Oral HS Crispin Arana MD      senna  2 tablet Oral Daily With Lunch Jessica Arreola, DO      sodium chloride  20 mL/hr Intravenous Once PRN Crispin Arana MD      sodium chloride  20 mL/hr Intravenous Once PRN Magali Lee MD           Lab Results: I have reviewed the following results:  Results from last 7 days   Lab Units 05/12/25  0614 05/09/25  0512 05/08/25  0552   HEMOGLOBIN g/dL 9.3* 8.8* 8.6*   HEMATOCRIT % 26.4* 24.7* 24.2*   WBC Thousand/uL 5.16 3.36* 2.63*   PLATELETS Thousands/uL 117* 160 195     Results from last 7 days   Lab Units 05/12/25  0614 05/10/25  1023 05/09/25  0512   BUN mg/dL 33* 34* 35*   SODIUM mmol/L 136 132* 138   POTASSIUM mmol/L 4.3 4.4 4.5   CHLORIDE mmol/L 101 98  101   CREATININE mg/dL 1.11 1.15 1.18   AST U/L 14 19 57*   ALT U/L 103* 187* 281*                Joshua Kaminski, DO  Physical Medicine and Rehabilitation  [facility]    I have spent a total time of 35 minutes in caring for this patient on the day of the visit/encounter including Counseling / Coordination of care, Documenting in the medical record, Reviewing/placing orders in the medical record (including tests, medications, and/or procedures), and Communicating with other healthcare professionals .

## 2025-05-12 NOTE — ASSESSMENT & PLAN NOTE
From: Damaso Tillman  Sent: 6/9/2021 10:49 AM CDT  To: Em Rn Triage  Subject: RE:refill    Needing to refill my medications. Can you do that. Nor do I need an appointment to come in.  Thank you Heme-onc following.   Treated with methotrexate and rituximab.   Received urinary alkalinization with methotrexate administration.

## 2025-05-12 NOTE — ASSESSMENT & PLAN NOTE
Baseline creatinine 0.8 to 1.1.   Etiology suspected to be ATN +/- prerenal azotemia.   Peak SCr 2.77 on 4/22/25.   LETY resolved. SCr 1.11 today.

## 2025-05-12 NOTE — CASE MANAGEMENT
"Clinical update faxed via [employer] to [employer] at [phone], determination pending.     1229 received fax from [employer] stating pt was \"previously approved through 5/17.  Clinical update is required on 5/17 or dc on 5/18\".    "

## 2025-05-12 NOTE — PHYSICAL THERAPY NOTE
I called pt's wife and left a voicemail asking her to call back about a time that we can meet as a team to review what we're doing here in therapy and to prepare for discharge planning.

## 2025-05-12 NOTE — ASSESSMENT & PLAN NOTE
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

## 2025-05-12 NOTE — PROGRESS NOTES
NEPHROLOGY HOSPITAL PROGRESS NOTE   Alexis Dennison 64 y.o. male MRN: [id number]  Unit/Bed#: -01 Encounter: [id number]  Reason for Consult: LETY  Assessment & Plan  LETY (acute kidney injury) (HCC)  Baseline creatinine 0.8 to 1.1.   Etiology suspected to be ATN +/- prerenal azotemia.   Peak SCr 2.77 on 4/22/25.   LETY resolved. SCr 1.11 today.     HTN (hypertension)  BP is controlled.   Not on BP meds.     Leukopenia  Defer to hematology.   WBC count up to 5.16 today.     Primary central nervous system (CNS) lymphoma  Heme-onc following.   Treated with methotrexate and rituximab.   Received urinary alkalinization with methotrexate administration.       TODAY's DISCUSSION / PLAN:  LETY is resolved and renal function is stable    Nothing further to add from a renal standpoint. Will sign off.   Feel free to call us back for any questions or concerns. Thank you!    SUBJECTIVE / 24H INTERVAL HISTORY:  Denies any CP or SOB.   No acute events overnight.     OBJECTIVE:  Current Weight: Weight - Scale: 75.4 kg (166 lb 3.2 oz)  Vitals:    05/11/25 1417 05/11/25 2036 05/12/25 0529 05/12/25 0532   BP: 133/83 125/85 116/71    BP Location: Right arm Right arm Right arm    Pulse: 91 78 74    Resp: 16 18 18    Temp: 97.9 °F (36.6 °C) 98.3 °F (36.8 °C) 98 °F (36.7 °C)    TempSrc: Oral Oral Oral    SpO2: 99% 98% 100%    Weight:   75.4 kg (166 lb 3.2 oz) 75.4 kg (166 lb 3.2 oz)   Height:           Intake/Output Summary (Last 24 hours) at 5/12/2025 1527  Last data filed at 5/12/2025 0614  Gross per 24 hour   Intake 120 ml   Output 1200 ml   Net -1080 ml     General: conscious, cooperative, no distress  Skin: dry  Eyes: pink conjunctivae  ENT: moist mucous membranes  Respiratory: equal chest expansion, clear breath sounds.  Cardiovascular: distinct heart sounds, normal rate, regular rhythm, no rub  Abdomen: soft, non tender, non distended, normal bowel sounds  Extremities: no edema.   Genitourinary: no connell catheter.   Neuro: awake,  alert.     Medications:    Current Facility-Administered Medications:     acetaminophen (TYLENOL) tablet 650 mg, 650 mg, Oral, Q6H PRN, Crispin Arana MD    allopurinol (ZYLOPRIM) tablet 300 mg, 300 mg, Oral, Daily, Crispin Arana MD, 300 mg at 05/12/25 0818    alteplase (CATHFLO) injection 2 mg, 2 mg, Intracatheter, Q1MIN PRN, Crispin Arana MD    alteplase (CATHFLO) injection 2 mg, 2 mg, Intracatheter, Q1MIN PRN, Magali Lee MD    aluminum-magnesium hydroxide-simethicone (MAALOX) oral suspension 30 mL, 30 mL, Oral, Q4H PRN, Crispin Arana MD    atorvastatin (LIPITOR) tablet 20 mg, 20 mg, Oral, Daily, Crispin Arana MD, 20 mg at 05/12/25 0818    bisacodyl (DULCOLAX) rectal suppository 10 mg, 10 mg, Rectal, Daily PRN, Jessica Arreola DO    calcium carbonate (TUMS) chewable tablet 1,000 mg, 1,000 mg, Oral, Daily PRN, Crispin Arana MD    chlorhexidine (PERIDEX) 0.12 % oral rinse 15 mL, 15 mL, Mouth/Throat, Q12H CAIT, Crispin Aarna MD, 15 mL at 05/12/25 0819    [COMPLETED] dexamethasone (DECADRON) tablet 4 mg, 4 mg, Oral, Q12H CAIT, 4 mg at 05/11/25 0908 **FOLLOWED BY** dexamethasone (DECADRON) tablet 2 mg, 2 mg, Oral, Q12H CAIT, 2 mg at 05/12/25 0818 **FOLLOWED BY** [START ON 5/15/2025] dexamethasone (DECADRON) tablet 1 mg, 1 mg, Oral, Q12H CAIT, Crispin Arana MD    docusate sodium (COLACE) capsule 100 mg, 100 mg, Oral, BID, Jessica Arreola DO, 100 mg at 05/12/25 0819    heparin (porcine) subcutaneous injection 5,000 Units, 5,000 Units, Subcutaneous, Q8H CAIT, Crispin Arana MD, 5,000 Units at 05/12/25 1336    insulin glargine (LANTUS) subcutaneous injection 10 Units 0.1 mL, 10 Units, Subcutaneous, HS, Crispin Arana MD, 10 Units at 05/11/25 2130    insulin lispro (HumALOG/ADMELOG) 100 units/mL subcutaneous injection 2-12 Units, 2-12 Units, Subcutaneous, 4x Daily (AC & HS), 2 Units at 05/12/25 1130 **AND** Fingerstick Glucose (POCT), , , 4x Daily AC and at bedtime, Crispin Arana MD    insulin lispro  (HumALOG/ADMELOG) 100 units/mL subcutaneous injection 6 Units, 6 Units, Subcutaneous, TID With Meals, PATI Porras, 6 Units at 05/12/25 1130    melatonin tablet 6 mg, 6 mg, Oral, HS, Crispin Arana MD, 6 mg at 05/11/25 2130    metFORMIN (GLUCOPHAGE-XR) 24 hr tablet 1,000 mg, 1,000 mg, Oral, Daily, Crispin Arana MD, 1,000 mg at 05/12/25 0819    OLANZapine (ZyPREXA) IM injection 5 mg, 5 mg, Intramuscular, BID PRN, Crispin Arana MD    ondansetron (ZOFRAN) injection 4 mg, 4 mg, Intravenous, Q6H PRN, Crispin Arana MD    oxyCODONE (ROXICODONE) split tablet 2.5 mg, 2.5 mg, Oral, Q4H PRN **OR** oxyCODONE (ROXICODONE) IR tablet 5 mg, 5 mg, Oral, Q4H PRN, Crispin Arana MD    pantoprazole (PROTONIX) EC tablet 40 mg, 40 mg, Oral, Early Morning, Crispin Arana MD, 40 mg at 05/12/25 0602    polyethylene glycol (MIRALAX) packet 17 g, 17 g, Oral, Daily PRN, Jessica Arreola DO, 17 g at 05/12/25 0819    QUEtiapine (SEROquel) tablet 12.5 mg, 12.5 mg, Oral, Daily, Crispin Arana MD, 12.5 mg at 05/12/25 1130    QUEtiapine (SEROquel) tablet 50 mg, 50 mg, Oral, HS, Crispin Arana MD, 50 mg at 05/11/25 2131    senna (SENOKOT) tablet 17.2 mg, 2 tablet, Oral, Daily With Lunch, Jessica Arreola DO, 17.2 mg at 05/12/25 1130    sodium chloride 0.9 % infusion, 20 mL/hr, Intravenous, Once PRN, Crispin Arana MD    sodium chloride 0.9 % infusion, 20 mL/hr, Intravenous, Once PRN, Magali Lee MD    Laboratory Results:  Results from last 7 days   Lab Units 05/12/25  0614 05/10/25  1023 05/09/25  0512 05/08/25  0552 05/07/25  0443 05/06/25  0500   WBC Thousand/uL 5.16  --  3.36* 2.63* 3.11*  --    HEMOGLOBIN g/dL 9.3*  --  8.8* 8.6* 8.7*  --    HEMATOCRIT % 26.4*  --  24.7* 24.2* 25.3*  --    PLATELETS Thousands/uL 117*  --  160 195 213  --    POTASSIUM mmol/L 4.3 4.4 4.5 4.1 4.3 4.4   CHLORIDE mmol/L 101 98 101 100 99 105   CO2 mmol/L 30 25 33* 32 34* 32   BUN mg/dL 33* 34* 35* 36* 40* 36*   CREATININE mg/dL 1.11 1.15 1.18  "1.23 1.36* 1.58*   CALCIUM mg/dL 9.0 9.1 9.0 8.5 8.4 8.5   MAGNESIUM mg/dL 1.4*  --   --   --  2.0  --    PHOSPHORUS mg/dL 2.7  --   --   --  3.2  --      Portions of the record may have been created with voice recognition software. Occasional wrong word or \"sound a like\" substitutions may have occurred due to the inherent limitations of voice recognition software. Read the chart carefully and recognize, using context, where substitutions have occurred.If you have any questions, please contact the dictating provider.    "

## 2025-05-13 LAB
GLUCOSE SERPL-MCNC: 102 MG/DL (ref 65–140)
GLUCOSE SERPL-MCNC: 159 MG/DL (ref 65–140)
GLUCOSE SERPL-MCNC: 160 MG/DL (ref 65–140)
GLUCOSE SERPL-MCNC: 168 MG/DL (ref 65–140)
T4 FREE SERPL-MCNC: 0.85 NG/DL (ref 0.61–1.12)
TSH SERPL DL<=0.05 MIU/L-ACNC: 0.29 UIU/ML (ref 0.45–4.5)

## 2025-05-13 PROCEDURE — 97530 THERAPEUTIC ACTIVITIES: CPT

## 2025-05-13 PROCEDURE — 99232 SBSQ HOSP IP/OBS MODERATE 35: CPT | Performed by: NURSE PRACTITIONER

## 2025-05-13 PROCEDURE — 99233 SBSQ HOSP IP/OBS HIGH 50: CPT | Performed by: STUDENT IN AN ORGANIZED HEALTH CARE EDUCATION/TRAINING PROGRAM

## 2025-05-13 PROCEDURE — 97129 THER IVNTJ 1ST 15 MIN: CPT

## 2025-05-13 PROCEDURE — 97535 SELF CARE MNGMENT TRAINING: CPT

## 2025-05-13 PROCEDURE — 82948 REAGENT STRIP/BLOOD GLUCOSE: CPT

## 2025-05-13 PROCEDURE — 97130 THER IVNTJ EA ADDL 15 MIN: CPT

## 2025-05-13 RX ADMIN — INSULIN LISPRO 6 UNITS: 100 INJECTION, SOLUTION INTRAVENOUS; SUBCUTANEOUS at 08:20

## 2025-05-13 RX ADMIN — INSULIN LISPRO 6 UNITS: 100 INJECTION, SOLUTION INTRAVENOUS; SUBCUTANEOUS at 17:08

## 2025-05-13 RX ADMIN — QUETIAPINE 12.5 MG: 25 TABLET ORAL at 12:18

## 2025-05-13 RX ADMIN — DOCUSATE SODIUM 100 MG: 100 CAPSULE, LIQUID FILLED ORAL at 08:18

## 2025-05-13 RX ADMIN — ALLOPURINOL 300 MG: 300 TABLET ORAL at 08:18

## 2025-05-13 RX ADMIN — DEXAMETHASONE 2 MG: 2 TABLET ORAL at 08:18

## 2025-05-13 RX ADMIN — DEXAMETHASONE 2 MG: 2 TABLET ORAL at 21:53

## 2025-05-13 RX ADMIN — HEPARIN SODIUM 5000 UNITS: 5000 INJECTION INTRAVENOUS; SUBCUTANEOUS at 21:54

## 2025-05-13 RX ADMIN — CHLORHEXIDINE GLUCONATE 15 ML: 1.2 SOLUTION ORAL at 08:18

## 2025-05-13 RX ADMIN — QUETIAPINE 50 MG: 25 TABLET ORAL at 21:53

## 2025-05-13 RX ADMIN — CHLORHEXIDINE GLUCONATE 15 ML: 1.2 SOLUTION ORAL at 21:54

## 2025-05-13 RX ADMIN — INSULIN LISPRO 2 UNITS: 100 INJECTION, SOLUTION INTRAVENOUS; SUBCUTANEOUS at 17:08

## 2025-05-13 RX ADMIN — Medication 6 MG: at 21:54

## 2025-05-13 RX ADMIN — INSULIN LISPRO 2 UNITS: 100 INJECTION, SOLUTION INTRAVENOUS; SUBCUTANEOUS at 08:20

## 2025-05-13 RX ADMIN — HEPARIN SODIUM 5000 UNITS: 5000 INJECTION INTRAVENOUS; SUBCUTANEOUS at 14:05

## 2025-05-13 RX ADMIN — HEPARIN SODIUM 5000 UNITS: 5000 INJECTION INTRAVENOUS; SUBCUTANEOUS at 05:53

## 2025-05-13 RX ADMIN — ATORVASTATIN CALCIUM 20 MG: 20 TABLET, FILM COATED ORAL at 08:18

## 2025-05-13 RX ADMIN — PANTOPRAZOLE SODIUM 40 MG: 40 TABLET, DELAYED RELEASE ORAL at 05:51

## 2025-05-13 RX ADMIN — SENNOSIDES 17.2 MG: 8.6 TABLET, FILM COATED ORAL at 12:18

## 2025-05-13 RX ADMIN — INSULIN LISPRO 2 UNITS: 100 INJECTION, SOLUTION INTRAVENOUS; SUBCUTANEOUS at 12:18

## 2025-05-13 RX ADMIN — METFORMIN ER 500 MG 1000 MG: 500 TABLET ORAL at 08:19

## 2025-05-13 RX ADMIN — INSULIN LISPRO 6 UNITS: 100 INJECTION, SOLUTION INTRAVENOUS; SUBCUTANEOUS at 12:19

## 2025-05-13 RX ADMIN — DOCUSATE SODIUM 100 MG: 100 CAPSULE, LIQUID FILLED ORAL at 17:08

## 2025-05-13 NOTE — PROGRESS NOTES
"   05/13/25 1030   Pain Assessment   Pain Assessment Tool 0-10   Pain Score No Pain   Restrictions/Precautions   Precautions 1:1;Bed/chair alarms;Cognitive;Fall Risk;Impulsive;Supervision on toilet/commode;Visual deficit  (no chemo prec this date)   Lifestyle   Autonomy \"I think you said before this is [place]\"   Putting On/Taking Off Footwear   Type of Assistance Needed Supervision   Comment cues for safest positioning EOB w/ feet flat on floor, pt was attempting to complete too far back in bed causing posterior LOB. Pt able to complete w/   Putting On/Taking Off Footwear CARE Score 4   Sit to Stand   Type of Assistance Needed Physical assistance   Physical Assistance Level 25% or less   Comment CGA-Alcides no AD, cues for initiation   Sit to Stand CARE Score 3   Transfers   Additional Comments Alcides no AD mobility in hallway, pt required several self paced standing rest breaks, slow pace, seeking handrail assist.  Pt denies lightheadedness, dizziness.   Exercise Tools   Exercise Tools Yes   Other Exercise Tool 1 Attempted UE TE using 2# DB pt demos poor technique and insight despite repeptitive multimodal cueing. Attempts for TE largely ineffective via DB exercises. Pt states while holding DB, \"Where's the LED switch...its not doing anything.\"   Cognition   Overall Cognitive Status Impaired   Arousal/Participation Responsive;Cooperative   Attention Attends with cues to redirect   Orientation Level Oriented to person;Disoriented to place;Disoriented to time;Disoriented to situation   Memory Decreased recall of biographical information;Decreased short term memory;Decreased recall of recent events;Decreased recall of precautions   Following Commands Follows one step commands inconsistently   Comments poor insight, poor recall   Additional Activities   Additional Activities Comments Attempted simple visual cognitive task of \"Ready, Set, Scan\" activity to challenge attention, problem solving, direction following as " well as visual scanning. Pt requires mod-max cueing to complete. Demos dec cog stamina, dec cog flexibility, requires repitition to attend to directions. W/o max cues pt unable to remember visual target he is searching for or even purpose of activity.   Activity Tolerance   Activity Tolerance Patient limited by fatigue   Assessment   Treatment Assessment OT session focsuing on fxnl cog, basic mobility skills. Pt appears more flat today compared to Sunday when this therapist last worked w/ pt. He endorses generally feelings of ongoing fatigue. Unable to report if he slept well last night or any events of this morning. Pt overall perfroming fair this session, w/ max repepition was able to report state as [place] as he remembered therapist orienting him ~10min prior. First reported year as 1988 and later reporting year as 2024, consistently reported month as April. Was not able to report place as hospital t/o session when asked despite frequent reorientation. pt required more assist w mobility this date, gait appearing a bit staggered, dec pace, required mulitple standing rest breaks to manage fatigue w/ walk from room<>OT gym. OT to see pt for PM session later this date. Pt continues to require skilled hands on assist in order to maintain his safety. Pt's 1:1 present at start and end of session.   Prognosis Fair   Problem List Decreased strength;Decreased range of motion;Decreased endurance;Impaired balance;Decreased mobility;Decreased coordination;Decreased cognition;Impaired judgement;Decreased safety awareness;Decreased skin integrity;Pain   Plan   Treatment/Interventions ADL retraining;Functional transfer training;Therapeutic exercise;Endurance training;Cognitive reorientation;Patient/family training;Equipment eval/education;Compensatory technique education;Continued evaluation   Progress Progressing toward goals

## 2025-05-13 NOTE — ASSESSMENT & PLAN NOTE
Lab Results   Component Value Date    HGBA1C 6.6 (H) 02/22/2025       Recent Labs     05/12/25  1540 05/12/25  2045 05/13/25  0616 05/13/25  1119   POCGLU 182* 98 168* 160*       Blood Sugar Average: Last 72 hrs:  (P) [id number]    - Metformin 1000mg daily  - Lantus 10u daily with Lispro 8u TID and SSI  - Monitor accuchecks while on steroids  - Management per IM

## 2025-05-13 NOTE — PROGRESS NOTES
05/13/25 0950   Pain Assessment   Pain Location/Orientation   (chest/he reports indigestion feeling)   Pain Radiating Towards not raditating, he said it started today   Pain Onset/Description   (started today, not changed by burbping, walking/moving. notified RN)   Pain Rating: FLACC (Rest) - Face 0   Pain Rating: FLACC (Rest) - Legs 0   Pain Rating: FLACC (Rest) - Activity 0   Pain Rating: FLACC (Rest) - Cry 0   Pain Rating: FLACC (Rest) - Consolability 0   Score: FLACC (Rest) 0   Pain Rating: FLACC (Activity) - Face 1   Pain Rating: FLACC (Activity) - Legs 0   Pain Rating: FLACC (Activity) - Activity 0   Pain Rating: FLACC (Activity) - Cry 1   Pain Rating: FLACC (Activity) - Consolability 1   Score: FLACC (Activity) 3   Restrictions/Precautions   Precautions 1:1;Bed/chair alarms;Cognitive;Fall Risk;Supervision on toilet/commode   Cognition   Overall Cognitive Status Impaired   Subjective   Subjective Alexis reports feeling tired, but willing to get OOB   Sit to Lying   Type of Assistance Needed Supervision   Sit to Lying CARE Score 4   Lying to Sitting on Side of Bed   Type of Assistance Needed Supervision   Lying to Sitting on Side of Bed CARE Score 4   Sit to Stand   Type of Assistance Needed Supervision   Comment CS   Sit to Stand CARE Score 4   Bed-Chair Transfer   Type of Assistance Needed Physical assistance   Chair/Bed-to-Chair Transfer CARE Score -   Transfer Bed/Chair/Wheelchair   Findings practiced today without RW to work on balance/dec risk for falls laci around turns , use of gait belt   Walk 10 Feet   Type of Assistance Needed Physical assistance   Physical Assistance Level 25% or less   Comment with gait belt, slower gait speed today, he reported chest pain/indigestion, when asked if he wanted to sit he said yes, limited walking today as a result   Walk 10 Feet CARE Score 3   Ambulation   Distance Walked (feet) 30 ft  (10)   Assist Device   (none)   Gait Pattern Inconsistant Vicki;Slow  Vicki;Improper weight shift   Limitations Noted In Balance;Endurance;Speed;Strength;Swing   Provided Assistance with: Balance   Walk Assist Level Minimum Assist   Toilet Transfer   Type of Assistance Needed Supervision;Adaptive equipment   Comment S with grab bar, he used both hands on R grab bar to stand up from the toilet   Toilet Transfer CARE Score 4   Toilet Transfer   Surface Assessed Standard Toilet   Adaptive Equipment Grab Bar   Equipment Use   NuStep 10 min level 2, SPM 40-50   Other Comments   Comments after using the NuStep, he reported he needed to use the bathroom, but did not express any urgency and did not look like it was urgent. Wheeled him back to room, cleaning services were outside of his room so we paused to allow space to enter; in this time frame he urinated in the WC and fully voided; when assisted to the toilet to get washed/changed, he did not need to urinate any more. A provided to change clothing, brief donned in case he has urgency again, and he was able to use wipes to clean himself. assisted in tightening the brief and donning pants. asissted him back to bed after this as he reported feeling tired and said he would liek down for a couple of minutes before OT arriving. Notifed RN and OT of incontinent episiode, EVS was still there to help clean floors. assisted in also changing his socks.   Assessment   Treatment Assessment Alexis cont to present with fluctuations in his overall functional mobility between Maddy-S due to changes in fatigue, and A needed to navigate through situations, like performing hygeine and getting cleaned after the incontinent episode. Currently anticipating he will need CS-Maddy at home for tasks based upon functional mobility plus cognitive changes that result in him needing some aspects of tasks completed for him. PT POC to cont to progress BLE strength, overall activity tolrance and balance to inc safety/dec fall risk factors, Planning on [facility] tomorrow at  10am to review d/c planning and recommendations for home.   PT Barriers   Physical Impairment Decreased strength;Decreased range of motion;Decreased endurance;Decreased mobility;Impaired balance;Decreased cognition;Impaired judgement;Decreased safety awareness   Functional Limitation Car transfers;Stair negotiation;Standing;Transfers;Walking   Plan   Treatment/Interventions Functional transfer training;LE strengthening/ROM;Elevations;Therapeutic exercise;Endurance training;Cognitive reorientation;Patient/family training;Equipment eval/education;Gait training;Bed mobility;Compensatory technique education   Progress Slow progress, cognitive deficits   PT Therapy Minutes   PT Time In 0950   PT Time Out 1025   PT Total Time (minutes) 35   PT Mode of treatment - Individual (minutes) 35   PT Mode of treatment - Concurrent (minutes) 0   PT Mode of treatment - Group (minutes) 0   PT Mode of treatment - Co-treat (minutes) 0   PT Mode of Treatment - Total time(minutes) 35 minutes   PT Cumulative Minutes 360   Therapy Time missed   Time missed? No

## 2025-05-13 NOTE — PROGRESS NOTES
05/13/25 0830   Pain Assessment   Pain Assessment Tool 0-10   Pain Score No Pain   Restrictions/Precautions   Precautions 1:1;Bed/chair alarms;Cognitive;Fall Risk;Impulsive;Supervision on toilet/commode;Visual deficit   Comprehension   Comprehension (FIM) 3 - Understands basic info/conversation 50-74% of time   Expression   Expression (FIM) 3 - Expresses basic info/needs 50-74% of time   Social Interaction   Social Interaction (FIM) 5 - Interacts appropriately with others 90% of time   Problem Solving   Problem solving (FIM) 1 - Needs direction nearly all the time   Memory   Memory (FIM) 1 - Patient does not effectively recognize, recall/perform at all   Speech/Language/Cognition Assessmetn   Treatment Assessment Pt was awake, alert and cooperative for session. It was noted that engaging in basic conversational speech to where pt was noted to elicit clearer speech output not evident of overall fatigue. When asking pt about sleep overnight, pt was not able to recall and 1:1 sitter did not receive report of how pt had slept. It is suspected that he did vs yesterday's session. Additionally, pt able to follow basic directions better from yesterday as he was able to site EOB appropriately today. Initiated task today which was a picture sequencing/problem solving task in which 4 pictures provided given various common tasks. Objective for pt was to number the steps in order using post-it notes labeled from 1-4 and to place accordingly onto the pictures. It was noted that w/ increased time, pt was able to complete accurate sequencing of 2 out of 5 pictures sets w/ 8/20 accuracy. For another 2 picture sets, pt was able to determine at least 2 out of the 4 steps, increasing accuracy to 12/20. When providing probing clues to check the sequence again, pt was able to correct items increased to 16/20 accurate. The last picture set was more difficult for pt noting inability to problem solve the task (tieing a neck tie) to where  "pt needed total assistance for this task despite all modes of cues.     Lastly, reviewed orientation information to where pt did not independently look to use of calendar which was on bedside table, nor the wall to where place, month and year were provided. It was noted that pt did look to his cell phone and recognized that it was  \"Tuesday\" but remained perseverative on this and had much difficulty in determining date, month, year. SLP was able to update bedside table calendar to the correct date where pt was able to recognize but still exhibited inability to locate month which was on the bedside table calendar. SLP referenced looking towards the wall to where pt was then able to locate the current month. For pt to identify current place, pt was able to locate on calendar but initially stated \"[facility].\" When cued to re-read the information, pt did correct to the name of the facility but increased cues needed to locate the name of hospital. As for the year, this was not listed on the calendar at bedside table to where SLP had pt locate the information onto the wall. When asking about his reason for being her, pt was unable to provide the answer, noting even decreased awareness even when presented w/ the information directly. Pt did appear to demonstrate mental fatigue as session progressed to where it is beneficial to continue shorter 30 minute sessions for ST services at this time. Currently pt will continue to benefit from ongoing skilled SLP services targeting functional independence of cognitive skills in hopes for eventually decreasing caregiver burden over time.    SLP Therapy Minutes   SLP Time In 0830   SLP Time Out 0900   SLP Total Time (minutes) 30   SLP Mode of treatment - Individual (minutes) 30   SLP Mode of treatment - Concurrent (minutes) 0   SLP Mode of treatment - Group (minutes) 0   SLP Mode of treatment - Co-treat (minutes) 0   SLP Mode of Treatment - Total time(minutes) 30 " minutes   SLP Cumulative Minutes 270   Therapy Time missed   Time missed? No

## 2025-05-13 NOTE — ASSESSMENT & PLAN NOTE
Monitor status post methotrexate  Previous baseline as OP 5/2023-2/22/25 was 1.1 to 1.0  CMP 5/9/25 with creatinine of 1.18 down from 1.23 on 5/8 and on 5/10 was 1.15  CMP on 5/12/25 showed creatinine stable at 1.1

## 2025-05-13 NOTE — PROGRESS NOTES
Progress Note - PMR   Name: Alexis Dennison 64 y.o. 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  > Started on high dose steroids and methotrexate every 2 weeks for 4 weeks (C1 received 4/18, C2 received 5/2) then maintenance every 4 weeks with Rituximab weekly for 8 weeks (1st dose Thursday 4/24 and second 5/3)  > Admitted to [facility] with ongoing cognitive deficits and delirium    - Continue Decadron 2 mg Q12 on taper  - Continue MTX + Rituximab as per Hem/onc  - Daily methotrexate levels  - Hem/Onc following  - NSGY following  - SLP for cognition  Type 2 diabetes mellitus with hyperglycemia, without long-term current use of insulin (HCC)  Lab Results   Component Value Date    HGBA1C 6.6 (H) 02/22/2025       Recent Labs     05/12/25  1540 05/12/25  2045 05/13/25  0616 05/13/25  1119   POCGLU 182* 98 168* 160*       Blood Sugar Average: Last 72 hrs:  (P) [id number]    - Metformin 1000mg daily  - Lantus 10u daily with Lispro 8u TID and SSI  - Monitor accuchecks while on steroids  - Management per IM  HTN, goal below 130/80  > Blood pressure controlled off antihypertensives    - Monitor VS  - Management per IM  Mixed hyperlipidemia  - Continue atorvastatin 10 mg daily  Thyroid nodule    Pulmonary nodule  > CTA 3/29: nonspecific 4 mm RLL pulmonary nodule   - follow up outpatient for repeat imaging in 3 months  Liver lesion  > CTA  3/29- nonspecific 12mm hypodense right hepatic lesion, recommended MRI abdomen  > MRI abdomen 3/30- simple right hepatic lobe cyst    - Follow up outpatient for monitoring  Ambulatory dysfunction  - Fall precautions  - PT/OT  Encephalopathy  > Patient has been pleasantly confused for months in the setting of brain mass.  > acutely worsened due to UTI and bacteremia. S/p 7 day course of antibx  > Continues to lack insight to his current situation and is severely cognitively impaired.  He does not get agitated for long periods of time and is mostly confused and impulsive.  > A&O 1 on [facility] admission    - Continue seroquel 12.5mg at noon with 50mg HS  - PRN Zyprexa 2.5mg IM as needed for agitation  -Overstimulation precautions, frequent re-orientation, re-direction, re-assurance  -Sleep log and agitation monitoring if needed  -Optimize sleep-wake cycle  -Limit sedating medications when possible  - Ensure optimal management electrolytes, nutrition, and hydration  - Ensure optimal bowel/bladder management  - Ensure optimal pain management   -Patient/family/caregiver education and training   -Continue 1:1 due to overall safety, impulsivity, lack of safety awareness and poor insight.  -Fall precautions with frequent rounding; proactive toileting program, patient should not be unattended in bathroom      LETY (acute kidney injury) (HCC)  > Baseline Cr 0.9  > Recently 1.3 improved from before- peak 2.7  > Now s/p bicarb    - IVF per IM/Nephro  - Management at discretion of nephro   E. coli UTI  > Pet met sepsis criteria on 4/29 with confusion and agitation. UA was positive. Urine culture and blood cultures showed Ecoli sensitive to cephalosporins.  > ID consulted and pt completed 7d course of ceftriaxone  > Approved to restart chemo 5/2.    - Monitor for any recurrence of agitation or new fevers/signs of infection  Impaired mobility and activities of daily living  Patient was evaluated by the rehabilitation team MD and an  appropriate candidate for acute inpatient rehabilitation program at this time.  The patient will tolerate 3 hours/day 5 to 7 days/week of intensive physical, occupational in order to obtain goals for community discharge  Due to the patient's functional Compared to their baseline level of function in addition to their ongoing medical needs, the patient would benefit from daily supervision from a rehabilitation physician as well as rehabilitation nursing to implement and adjust the medical as well as functional plan of care in order to meet the patient's goals.  HTN (hypertension)  Had been on losartan but holding at this time was her blood pressures are good  Transaminitis  CMP checked on 5/9 and AST is down to 57 from 111 and ALT to 281 from 322  ALT and AST were elevated  Hyponatremia  Sodium improved from 132 up to 136 today on 5/12  Thrombocytopenia (HCC)  Platelets down to 100 17K will need to continue monitoring while on chemotherapy  Sepsis (HCC)    E coli bacteremia    Leukopenia    At risk for acid-base imbalance    Electrolyte imbalance risk      Subjective   Patient is a 64-year-old male with history of type 2 diabetes, GERD, hyperlipidemia and hypertension on nonalcoholic fatty liver disease and sleep apnea who presents to [facility] on 3/29/2025 for increasing confusion. He had been recently diagnosed with a brain mass and family reported that he had been having increased symptoms including forgetfulness over the past several months. Initial workup was negative however he was seen in the ER on 3/24 and a CT of the head was concerning for brain lesion. 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 Additional areas of enhancement in the basal ganglia on the left as well as the left cerebral peduncle/left quirino. There was an initial plan to have an outpatient neurosurgery evaluation but because of his worsening  symptoms including visual deficits he would came to the ER sooner. A CT of the chest abdomen pelvis was completed on 3/29 showing a right lower lobe pulmonary nodule and nonspecific hepatic lesion and MRI was completed revealing a simple right hepatic lobe cyst. A diagnostic lumbar puncture was completed on 3/31 suspicious for CNS lymphoma and a repeat CT completed on 4/3 showed worsening hydrocephalus and MRI on 4/11 showed disease progression. Biopsy was done and EVD placed on 4/14 and the patient self extubated postprocedure. The preliminary results from the biopsy were indicative of a small round blue cell tumor suggestive of non-Hodgkin's lymphoma. He was started on high-dose steroids, methotrexate and rituximab by oncology but did have a complicated course including LETY, intermittent agitated delirium requiring medications as well as continuous observation as well as an E. coli UTI with bacteremia status post course of antibiotics. The EVD was removed on 4/26.     Chief Complaint: f/u ambulatory dysfunction    Interval: Patient seen and evaluated in her room.  Overall has been participating with therapy but still lacks insight and safety awareness warranting continued one-to-one observation for safety and prevention of elopement.  Plan for family meeting tomorrow at 10 AM to discuss overall plan.  We do anticipate family training sessions will be required on multiple occasions in preparation for discharge home where he will likely continue to need 24/7 supervision.  Otherwise he has not had any fever, chills, nausea vomiting, cough, shortness of breath, diarrhea or constipation.  He is fully participating in therapy.  He is eating 100% of his meals, voiding with intermittent incontinence.  Despite not feeling constipated his last bowel movement was on 5/9 and recommending additional as needed medication use spoke with nursing.    This patient was discussed by the interdisciplinary team in weekly case  conference today. The care of the patient was extensively discussed with all care providers and an appropriate rehabilitation plan was formulated unique for this patient. Barriers were identified preventing progression of therapy and appropriate interventions were discussed with each discipline. Please see the team note for input from all disciplines regarding barriers, intervention, and discharge planning.      Objective :  Temp:  [97.5 °F (36.4 °C)-97.8 °F (36.6 °C)] 97.7 °F (36.5 °C)  HR:  [72-79] 72  BP: (109-117)/(70-72) 117/70  Resp:  [17-18] 18  SpO2:  [96 %-98 %] 98 %  O2 Device: None (Room air)    Functional Update:  Physical Therapy Occupational Therapy Speech Therapy   Weight Bearing Status: Full Weight Bearing  Transfers: Incidental Touching  Bed Mobility: Supervision  Amulation Distance (ft): 150 feet  Ambulation: Incidental Touching  Assistive Device for Ambulation: Other (none)  Number of Stairs: 12  Assistive Device for Stairs: Right Hand Rail  Ramp: Incidental Touching  Assistive Device for Ramp: None  Discharge Recommendations: Home with:  DC Home with:: 24 Hour Supervision, Family Support, Outpatient Physical Therapy   Eating:  (setup)  Grooming: Incidental Touching  Bathing: Minimal Assistance  Bathing: Minimal Assistance  Upper Body Dressing: Supervision  Lower Body Dressing: Minimal Assistance, Moderate Assistance  Toileting: Minimal Assistance  Toilet Transfer: Incidental Touching  Cognition: Exceptions to WNL  Cognition: Decreased Memory, Impulsive, Decreased Executive Functions, Behavioral Considerations, Decreased Attention, Decreased Comprehension, Decreased Safety  Orientation: Person   Mode of Communication: Verbal  Speech/Language:  (does demonstrate word finding)  Cognition: Exceptions to WNL  Cognition: Decreased Memory, Decreased Executive Functions, Decreased Attention, Decreased Comprehension, Decreased Safety, Impulsive  Orientation: Person  Discharge Recommendations: Home  with:  DC Home with:: 24 Hour Supervision, 24 Hour Assisteance, Family Support, Home Speech Therapy, Outpatient Speech Therapy (pending pt progress)         Physical Exam  Vitals reviewed.   Constitutional:       Appearance: Normal appearance. He is normal weight.   HENT:      Head: Normocephalic.      Comments: Anterior cranial scalp section clean and healing with dried blood     Right Ear: External ear normal.      Left Ear: External ear normal.      Nose: Nose normal. No rhinorrhea.      Mouth/Throat:      Mouth: Mucous membranes are moist.      Pharynx: Oropharynx is clear.   Eyes:      General: No scleral icterus.  Cardiovascular:      Rate and Rhythm: Normal rate.   Pulmonary:      Effort: Pulmonary effort is normal. No respiratory distress.      Breath sounds: Normal breath sounds.   Abdominal:      General: There is no distension.      Palpations: Abdomen is soft.   Musculoskeletal:      Right lower leg: No edema.      Left lower leg: No edema.   Skin:     General: Skin is warm and dry.   Neurological:      Mental Status: He is alert.      Motor: No weakness.      Gait: Gait abnormal.      Comments: Oriented to self only, limited insight and safety awareness   Psychiatric:         Mood and Affect: Mood normal.         Behavior: Behavior normal.           Scheduled Meds:  Current Facility-Administered Medications   Medication Dose Route Frequency Provider Last Rate    acetaminophen  650 mg Oral Q6H PRN Crispin Arana MD      allopurinol  300 mg Oral Daily Crispin Arana MD      alteplase  2 mg Intracatheter Q1MIN PRN Crispin Arana MD      alteplase  2 mg Intracatheter Q1MIN PRN Magali Lee MD      aluminum-magnesium hydroxide-simethicone  30 mL Oral Q4H PRN Crispin Arana MD      atorvastatin  20 mg Oral Daily Crispin Arana MD      bisacodyl  10 mg Rectal Daily PRN Jessica Arreola DO      calcium carbonate  1,000 mg Oral Daily PRN Crispin Arana MD      chlorhexidine  15 mL Mouth/Throat Q12H CAIT  Crispin Arana MD      dexamethasone  2 mg Oral Q12H [facility] Crispin Arana MD      Followed by    [START ON 5/15/2025] dexamethasone  1 mg Oral Q12H [facility] Crispin Arana MD      docusate sodium  100 mg Oral BID Jessica BHUPENDRA Arreola, DO      heparin (porcine)  5,000 Units Subcutaneous Q8H CAIT Crispin Arana MD      insulin glargine  10 Units Subcutaneous HS Crispin Arana MD      insulin lispro  2-12 Units Subcutaneous 4x Daily (AC & HS) Crispin Arana MD      insulin lispro  6 Units Subcutaneous TID With Meals PATI Porras      lactulose  20 g Oral Daily PRN Joshua Kaminski, DO      melatonin  6 mg Oral HS Crispin Arana MD      metFORMIN  1,000 mg Oral Daily Crispin Arana MD      OLANZapine  5 mg Intramuscular BID PRN Crispin Arana MD      ondansetron  4 mg Intravenous Q6H PRN Crispin Arana MD      oxyCODONE  2.5 mg Oral Q4H PRN Crispin Arana MD      Or    oxyCODONE  5 mg Oral Q4H PRN Crispin Arana MD      pantoprazole  40 mg Oral Early Morning Crispin Arana MD      polyethylene glycol  17 g Oral Daily PRN Jessica Arreola, DO      QUEtiapine  12.5 mg Oral Daily Crispin Arana MD      QUEtiapine  50 mg Oral HS Crispin Arana MD      senna  2 tablet Oral Daily With Lunch Jessica Arreola, DO      sodium chloride  20 mL/hr Intravenous Once PRN Crispin Arana MD      sodium chloride  20 mL/hr Intravenous Once PRN Magali Lee MD           Lab Results: I have reviewed the following results:  Results from last 7 days   Lab Units 05/12/25  0614 05/09/25  0512 05/08/25  0552   HEMOGLOBIN g/dL 9.3* 8.8* 8.6*   HEMATOCRIT % 26.4* 24.7* 24.2*   WBC Thousand/uL 5.16 3.36* 2.63*   PLATELETS Thousands/uL 117* 160 195     Results from last 7 days   Lab Units 05/12/25  0614 05/10/25  1023 05/09/25  0512   BUN mg/dL 33* 34* 35*   SODIUM mmol/L 136 132* 138   POTASSIUM mmol/L 4.3 4.4 4.5   CHLORIDE mmol/L 101 98 101   CREATININE mg/dL 1.11 1.15 1.18   AST U/L 14 19 57*   ALT U/L 103* 187* 281*              Joshua  Yamilex,   Physical Medicine and Rehabilitation  [facility]    I have spent a total time of 50 minutes in caring for this patient on the day of the visit/encounter including Counseling / Coordination of care, Documenting in the medical record, Reviewing/placing orders in the medical record (including tests, medications, and/or procedures), and Communicating with other healthcare professionals .

## 2025-05-13 NOTE — TEAM CONFERENCE
Acute RehabilitationTeam Conference Note  Date: 5/13/2025   Time: 9:13 AM       Patient Name:  Alexis Dennison       Medical Record Number: [id number]   YOB: 1960  Sex: Male          Room/Bed:  [facility]4/[facility] 964-01  Payor Info:  Payor: [employer] CROSS / Plan: MISC BLUE CROSS / Product Type: Blue Fee for Service /      Admitting Diagnosis: Lymphoma (HCC) [C85.90]   Admit Date/Time:  5/7/2025  6:13 PM  Admission Comments: No comment available     Primary Diagnosis:  Primary central nervous system (CNS) lymphoma  Principal Problem: Primary central nervous system (CNS) lymphoma    Patient Active Problem List    Diagnosis Date Noted    Thrombocytopenia (HCC) 05/12/2025    Hyponatremia 05/10/2025    Impaired mobility and activities of daily living 05/08/2025    HTN (hypertension) 05/08/2025    Transaminitis 05/08/2025    Leukopenia 05/06/2025    At risk for acid-base imbalance 05/06/2025    Electrolyte imbalance risk 05/06/2025    E. coli UTI 05/01/2025    Sepsis (HCC) 04/29/2025    E coli bacteremia 04/29/2025    Metabolic alkalosis 04/24/2025    LETY (acute kidney injury) ([facility]) 04/21/2025    Goals of care, counseling/discussion 04/16/2025    Palliative care encounter 04/16/2025    Primary CNS lymphoma 04/16/2025    Encephalopathy 04/02/2025    Ambulatory dysfunction 04/01/2025    Thyroid nodule 03/30/2025    Pulmonary nodule 03/30/2025    Liver lesion 03/30/2025    Primary central nervous system (CNS) lymphoma 03/29/2025    Type 2 diabetes mellitus with hyperglycemia, without long-term current use of insulin (HCC) 06/14/2022    HTN, goal below 130/80 06/14/2022    Mixed hyperlipidemia 06/14/2022    Vitamin D deficiency 06/14/2022    NAFLD (nonalcoholic fatty liver disease) 06/14/2022       Physical Therapy:    Weight Bearing Status: Full Weight Bearing  Transfers: Incidental Touching  Bed Mobility: Supervision  Amulation Distance (ft): 150 feet  Ambulation: Incidental Touching  Assistive Device for Ambulation:  Other (none)  Number of Stairs: 12  Assistive Device for Stairs: Right Hand Rail  Ramp: Incidental Touching  Assistive Device for Ramp: None  Discharge Recommendations: Home with:  DC Home with:: 24 Hour Supervision, Family Support, Outpatient Physical Therapy    Alexis is a 65 y/o male with PMH including DM, HLD, HTN, GERD, liver disease and sleep apnea, who presented to hospital on 3/29 for increased confusion. 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         5/12:Pt cont to be limited by poor safety awareness, decreased cognition, decreased processing, decreased caregiver support and poor righting reactions. Pt remains a high fall risk 2/2 to this limitations. Pt was able to maintain CS/CGA with gait, CGA on a FF with single HR, S with STS transfers and S with bed mobility. Pt requires constant VC's for direction and task management 2/2 poor cognition and decreased carryover. Pt is recommended 24 S at home 2/2 decreased cognition and current changes in pt's abilities. Pt will cont POC as tolerated with cont focus on gait, balance, coordination, duel tasking, righting reactions and stair management to decrease burden of care and decrease fall risk. Currently planning on family/team meeting 5/14 at 10am to review d/c planning with family.     Occupational Therapy:  Eating:  (setup)  Grooming: Incidental Touching  Bathing: Minimal Assistance  Bathing: Minimal Assistance  Upper Body Dressing: Supervision  Lower Body Dressing: Minimal Assistance, Moderate Assistance  Toileting: Minimal Assistance  Toilet Transfer: Incidental Touching  Cognition: Exceptions to WNL  Cognition: Decreased Memory, Impulsive, Decreased Executive Functions, Behavioral Considerations, Decreased  Attention, Decreased Comprehension, Decreased Safety  Orientation: Person  Discharge Recommendations: Home with:  DC Home with:: Family Support, First Floor Setup, 24 Hour Supervision, 24 Hour Assistance       05/12/25  Pt is demonstrating fair progress with occupational therapy and is progressing toward long term goals for ADL, IADL, and functional transfers/mobility. Pts long term goals for ADLs are sup w/ no assistive device. Pt is currently Partial/moderate assistance  for ADLs. Pt continues to present with impairments in activity tolerance, endurance, standing balance/tolerance, UE strength, memory, insight, safety , judgement , attention , sequencing , task initiation , and task termination . Occupational performance remains limited by fatigue, impulsivity, decreased caregiver support, risk for falls, and home environment. Family training/education will be required prior to D/C. Pt will continue to benefit from skilled acute rehab OT services to address above mentioned barriers and maximize functional independence in baseline areas of occupation to meet established treatment goals with overall decreased burden of care. Plan of care to continue to focus on ADL Retraining , LB Dressing, UB dressing, Functional Transfers, Functional Cognition, Functional Attention, Standing tolerance, Standing balance , Gross motor strengthening , Family training/education, Energy conservation training/education, healthy coping education, Leisure and social pursuits, and community re-integration. Goals for the upcoming week are: setup FT w/ family and explain reasoning for 24/7 SUP level rec for home    Anticipate Discharge date to be set.       Speech Therapy:  Mode of Communication: Verbal  Speech/Language:  (does demonstrate word finding)  Cognition: Exceptions to WNL  Cognition: Decreased Memory, Decreased Executive Functions, Decreased Attention, Decreased Comprehension, Decreased Safety, Impulsive  Orientation:  Person  Discharge Recommendations: Home with:  DC Home with:: 24 Hour Supervision, 24 Hour Assisteance, Family Support, Home Speech Therapy, Outpatient Speech Therapy (pending pt progress)  Pt currently being followed for cognitive linguistic tx sessions. Upon admission to the unit, pt was demonstrating fairly significant difficulty in providing own LT biographical information to where the Informal Cognitive Assessment was initiated only. Pt is demonstrating severely impaired deficits noted in the following areas: decreased attention, LT memory recall, ST memory recall , problem solving, reasoning, sequencing, organization of thoughts, judgement, slower processing, insight, and as well as likely deficits in language skills. Pt's overall attention towards tasks did fluctuate throughout assessment, but able to be redirected to tasks at hand. Throughout orientation and LT memory biographical review, pt exhibiting more deficits given cognitive skills, but when initiating more structured tasks, pt demonstrating more word finding deficits, as well as perseverations on words provided. Question true language skills vs cognitive deficits, most likely combination of both, currently impacting pt's overall cognitive functioning. In order to address the noted barriers, skilled SLP services will address this by targeting the following interventions: visual orientation checklist, memory book/memory packet, verbal problem solving task, visual memory recall tasks, drawing conclusions activities, written sequencing tasks, categorization tasks, picture problem solving activities, functional reading tasks, word deduction tasks and family education/training.     Current level of functioning:   Comprehension: mod A  Expression: mod A  Social Interaction: supervision-min A  Executive functions: total A  Memory: total A    Family training/education has been initiated with pt's spouse, Naldo very briefly but also children were present to  provide initial education given role of services as well as skills being targeted. There will be a plan for family meeting later to d/w family about the recommendations for the need given 24/7 supervision/assistance at times of discharge due to the significant deficits of cognitive skills at this time. Recommendations at time of discharge are for continued skilled ST services pending disposition home vs OP.    This week, focus for continued services to target reorientation skills to current events, review of LTM biographical information, memory recall strategies, basic categorization tasks, basic problem solving skills. At this time, pt will continue to benefit from skilled cognitive linguistic tx session to maximize overall functional independence given overall cognitive linguistic skills in attempts to decreased caregiver burden over time.     Nursing Notes:  Appetite: Good  Diet Type: Diabetic, No added salt                      Diet Patient/Family Education Complete: No                         Type of Wound Patient/Family Education: No  Bladder: Continent     Bladder Patient/Family Education: No  Bowel: Continent     Bowel Patient/Family Education: No     Pain Score: 0                          Pain Patient/Family Education: No  Medication Management/Safety  Medication Patient/Family Education Complete: No    64 y.o. male with a PMH of hypertension, hyperlipidemia, diabetes who presented to the [facility] primary history of persistent lymphoma.  Patient was just hospitalized for encephalopathy due to primary CNS lymphoma.  Patient on a dexamethasone taper.  Patient received methotrexate during his hospitalization and will need continuing monitoring.  Patient now admitted to inpatient acute rehab.     5/8/25-This week we will continue to monitor patient's labs and vital signs and monitor pain. We will medicate patient for pain as ordered. We will encourage independence with ADL's. We will  educate fall prevention and safety precautions. Pt is a 1:1 for cognitive impairment, poor safety and impulsive behavior. We will continue medication education. Monitor and maintain adequate po intake. Turn and reposition Q 2 hour, assess and monitor for skin breakdown.    5/13  LETY is resolved and renal function is stable.Renal signed off.On 5/10/25, increased Lispro WM to 4U TID .BSs should continue to improve with steroid being tapered  Will increase Lispro WM to 6U TID today 5/12/25, Continue DM diet.  This week we will continue to monitor patient's labs and vital signs and monitor pain. We will medicate patient for pain as ordered. We will encourage independence with ADL's. We will educate fall prevention and safety precautions. Pt is a 1:1 for cognitive impairment, poor safety and impulsive behavior. We will continue medication education. Monitor and maintain adequate po intake. Turn and reposition Q 2 hour, assess and monitor for skin breakdown.      Case Management:     Discharge Planning  Living Arrangements: Lives w/ Spouse/significant other  Support Systems: Spouse/significant other, Daughter  Assistance Needed: will need 24/7 Supervision  Type of Current Residence: Private residence  Current Home Care Services: No  Pt admitted s/p new lymphoma dx and is undergoing treatment while in rehab. Pt has a supportive spouse who works nights, and two supportive children and two sisters who reside in [facility]. Team feels a family mtg is to be held soon to review the need for supervision/assist upon pts return home. Clinical update is to be done later this week with [employer]. Following to assist w/dc planning recommendations.     Is the patient actively participating in therapies? yes  List any modifications to the treatment plan: None    Barriers Interventions   Balance Balance training, high repetitive tasks   Gross cognitive impairment; disorientation; poor safety awareness 1:1; Medication  management, sleep/ behavior tracking log; meeting with family; memory book; picture sequencing tasks;   Transeminitis  Monitoring labs   Diabetes  Adjusting medications   Difficulty with med management Family training and education with wife   Stairs to enter the house Endurance and stair training                 Is the patient making expected progress toward goals? yes  List any update or changes to goals: No    Medical Goals: Patient will be medically stable for discharge to [facility] upon completion of rehab program and Patient will be able to manage medical conditions and comorbid conditions with medications and follow up upon completion of rehab program    Weekly Team Goals:   Rehab Team Goals  ADL Team Goal: Patient will require supervision with ADLs with least restrictive device upon completion of rehab program  Bowel/Bladder Team Goal: Patient will require supervision with bladder/bowel management with least restrictive device upon completion of rehab program  Transfer Team Goal: Patient will require supervision with transfers with least restrictive device upon completion of rehab program  Locomotion Team Goal: Patient will require supervision with locomotion with least restrictive device upon completion of rehab program  Cognitive Team Goal: Patient will require supervision for basic and complex tasks upon completion of rehab program    Discussion: Patient is participating in and making progress with rehab program. Currently he's functioning at a min-mod a for self care, min A for mobility, mod A for comprehension and expression and total for executive function and memory. The patient's family is aware that he will require assistance upon his transition home and the primary focus for rehab is on maximizing his functional performance with mobility and self care as well as completing thorough family training to reduce burden of care and facilitate safe transition home. The team  anticipates that he will meet a S level for mobility and self care and min to mod A for cognitive function. The team expects that he will be able to meet these goals and complete family training to be able to assist appropriately over the next week.     Anticipated Discharge Date:  D  [facility] Team Members Present:  The following team members are supervising care for this patient and were present during this Weekly Team Conference.    Physician: Dr. Yamilex DO  : LIZZ Youssef  Registered Nurse: Brianna Wright RN  Physical Therapist: Kate Gomez DPT  Occupational Therapist: Berkley Harrison MS, OTR/L  Speech Therapist: Kate Aranda MA, CCC-SLP

## 2025-05-13 NOTE — PROGRESS NOTES
"   05/13/25 1030   Pain Assessment   Pain Assessment Tool 0-10   Pain Score No Pain   Restrictions/Precautions   Precautions 1:1;Bed/chair alarms;Cognitive;Fall Risk;Impulsive;Supervision on toilet/commode;Visual deficit  (no chemo prec this date)   Lifestyle   Autonomy \"I think you said before this is [place]\"   Putting On/Taking Off Footwear   Type of Assistance Needed Supervision   Comment cues for safest positioning EOB w/ feet flat on floor, pt was attempting to complete too far back in bed causing posterior LOB. Pt able to complete w/   Putting On/Taking Off Footwear CARE Score 4   Sit to Stand   Type of Assistance Needed Physical assistance   Physical Assistance Level 25% or less   Comment CGA-Alcides no AD, cues for initiation   Sit to Stand CARE Score 3   Transfers   Additional Comments Alcides no AD mobility in hallway, pt required several self paced standing rest breaks, slow pace, seeking handrail assist.  Pt denies lightheadedness, dizziness.   Exercise Tools   Exercise Tools Yes   Other Exercise Tool 1 Attempted UE TE using 2# DB pt demos poor technique and insight despite repeptitive multimodal cueing. Attempts for TE largely ineffective via DB exercises. Pt states while holding DB, \"Where's the LED switch...its not doing anything.\"   Cognition   Overall Cognitive Status Impaired   Arousal/Participation Responsive;Cooperative   Attention Attends with cues to redirect   Orientation Level Oriented to person;Disoriented to place;Disoriented to time;Disoriented to situation   Memory Decreased recall of biographical information;Decreased short term memory;Decreased recall of recent events;Decreased recall of precautions   Following Commands Follows one step commands inconsistently   Comments poor insight, poor recall   Additional Activities   Additional Activities Comments Attempted simple visual cognitive task of \"Ready, Set, Scan\" activity to challenge attention, problem solving, direction following as " well as visual scanning. Pt requires mod-max cueing to complete. Demos dec cog stamina, dec cog flexibility, requires repitition to attend to directions. W/o max cues pt unable to remember visual target he is searching for or even purpose of activity.   Activity Tolerance   Activity Tolerance Patient limited by fatigue   Assessment   Treatment Assessment OT session focsuing on fxnl cog, basic mobility skills. Pt appears more flat today compared to Sunday when this therapist last worked w/ pt. He endorses generally feelings of ongoing fatigue. Unable to report if he slept well last night or any events of this morning. Pt overall perfroming fair this session, w/ max repepition was able to report state as [place] as he remembered therapist orienting him ~10min prior. First reported year as 1988 and later reporting year as 2024, consistently reported month as April. Was not able to report place as hospital t/o session when asked despite frequent reorientation. pt required more assist w mobility this date, gait appearing a bit staggered, dec pace, required mulitple standing rest breaks to manage fatigue w/ walk from room<>OT gym. OT to see pt for PM session later this date. Pt continues to require skilled hands on assist in order to maintain his safety. Pt's 1:1 present at start and end of session.   Prognosis Fair   Problem List Decreased strength;Decreased range of motion;Decreased endurance;Impaired balance;Decreased mobility;Decreased coordination;Decreased cognition;Impaired judgement;Decreased safety awareness;Decreased skin integrity;Pain   Plan   Treatment/Interventions ADL retraining;Functional transfer training;Therapeutic exercise;Endurance training;Cognitive reorientation;Patient/family training;Equipment eval/education;Compensatory technique education;Continued evaluation   Progress Progressing toward goals   OT Therapy Minutes   OT Time In 1030   OT Time Out 1130   OT Total Time (minutes) 60   OT Mode of  treatment - Individual (minutes) 60   OT Mode of treatment - Concurrent (minutes) 0   OT Mode of treatment - Group (minutes) 0   OT Mode of treatment - Co-treat (minutes) 0   OT Mode of Treatment - Total time(minutes) 60 minutes   OT Cumulative Minutes 405   Therapy Time missed   Time missed? No

## 2025-05-13 NOTE — ASSESSMENT & PLAN NOTE
TSH/free T4 4/21/25 = 0.019/1.13  Nonemergent outpatient endocrine follow-up.   Will require outpatient TSH, free T4, +/- further imaging with endocrine such as radioactive uptake  Will repeat TSH/free T4 and add on to yesterdays labs 5/12/25

## 2025-05-13 NOTE — ASSESSMENT & PLAN NOTE
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

## 2025-05-13 NOTE — PROGRESS NOTES
"   05/13/25 1300   Pain Assessment   Pain Assessment Tool 0-10   Pain Score No Pain   Restrictions/Precautions   Precautions 1:1;Bed/chair alarms;Cognitive;Fall Risk;Impulsive;Supervision on toilet/commode  (no chemo prec this date)   Lifestyle   Autonomy \"No, I didn't grow up here. I grew up in [place].\"   Shower/Bathe Self   Type of Assistance Needed Physical assistance   Physical Assistance Level 25% or less   Comment Shower assessed at modified set up level of primarily seated, w/ HHSH and GB use. Pt required Alcides physical assist but mod cues for technique. While seated and partially in stance pt did automatically rinse self. Cued pt to use the shampoo bottle he was holding to wash his hair, pt unable to follow cues to complete, but once OT placed shampoo on head pt automatically rubbed over head. Required assist to apply soap to washcloth and then min cues to wash all parts while seated, CGA for balance in stance at GB to wash groin/buttocks.   Shower/Bathe Self CARE Score 3   Bathing   Assessed Bath Style Shower   Findings  For shower, cleansed w/ Remedy body wash and shampoo and RUE PICC covered and remained dry for task.   Tub/Shower Transfer   Findings Alcides SPT WC<>shower bench w/ GB use   Upper Body Dressing   Type of Assistance Needed Supervision   Comment sup seated w/ extra time and cues when doffing for tech   Upper Body Dressing CARE Score 4   Lower Body Dressing   Type of Assistance Needed Physical assistance   Physical Assistance Level 26%-50%   Comment Assist for don/doff tabbed brief  in case of additional incontinence episode, pt don/doff scrub pants w/ CGA, cues to first doff sneakers  prior to doffing pants.   Lower Body Dressing CARE Score 3   Putting On/Taking Off Footwear   Type of Assistance Needed Supervision   Comment sup w/ extra time to doff socks and sneakers and don  socks.   Putting On/Taking Off Footwear CARE Score 4   Sit to Stand   Type of Assistance Needed Physical " assistance   Physical Assistance Level 25% or less   Comment CGA-Alcides no AD   Sit to Stand CARE Score 3   Toileting Hygiene   Type of Assistance Needed Physical assistance   Physical Assistance Level 25% or less   Comment Alcides for clothing management w/ tabbed brief and cues for tech   Toileting Hygiene CARE Score 3   Toileting   Findings continent of urine in stance at toilet, but requried cues for toileting pt did not voice need to void.   Toilet Transfer   Comment pt stood to void   Transfers   Additional Comments deferred fxnl mobility in hallways this afternoon 2' fatigue and EC for shower activities   Cognition   Overall Cognitive Status Impaired   Arousal/Participation Alert;Cooperative   Attention Attends with cues to redirect   Orientation Level Oriented to person;Disoriented to place;Disoriented to time;Disoriented to situation   Memory Decreased recall of biographical information;Decreased recall of recent events;Decreased long term memory;Decreased short term memory;Decreased recall of precautions   Following Commands Follows one step commands with increased time or repetition   Comments poor insight, poor recall   Additional Activities   Additional Activities Comments Completed bottle lid matching task seated using common household items. Pt completes 8/9 bottles correctly required cues to correct one mistake, but requires extra time to complete 2' ongoing fxnl cog deficits.   Activity Tolerance   Activity Tolerance Patient limited by fatigue   Assessment   Treatment Assessment OT session focusing on ADL retraining including first trial of modified shower routine, as well as basic problem solving activity w/ common household items. Pt continues to demo extensive fxnl cog deficits. OT continues to rec 24/7 sup and assist at home to maintain his safety. Plan for family meeting tomorrow w/ pt support system. OT to continue POC at this time.   Prognosis Fair   Problem List Decreased strength;Decreased range  of motion;Decreased endurance;Impaired balance;Decreased coordination;Decreased mobility;Decreased cognition;Impaired judgement;Decreased safety awareness   Plan   Treatment/Interventions ADL retraining;Functional transfer training;Therapeutic exercise;Endurance training;Cognitive reorientation;Patient/family training;Equipment eval/education;Compensatory technique education;Continued evaluation;Spoke to nursing   Progress Progressing toward goals   OT Therapy Minutes   OT Time In 1300   OT Time Out 1400   OT Total Time (minutes) 60   OT Mode of treatment - Individual (minutes) 60   OT Mode of treatment - Concurrent (minutes) 0   OT Mode of treatment - Group (minutes) 0   OT Mode of treatment - Co-treat (minutes) 0   OT Mode of Treatment - Total time(minutes) 60 minutes   OT Cumulative Minutes 465   Therapy Time missed   Time missed? No

## 2025-05-13 NOTE — ASSESSMENT & PLAN NOTE
Hemoglobin A1C was 6.6 on 2/22/25  Sees David [facility] in [place]  Home:  Janumet XR  qd  Here: Lantus 10 U qhs/Lispro 6 U TID/Metformin 1000 mg qd  Insulin wasn't here 5/8 AM so didn't get Lispro with breakfast which explains why lunch BS was elevated.   Metformin was ordered on transfer to be restarted 5/8/25 AM  5/8/25:  BS at dinnertime 195, evening of 5/7/25 was 250.  Since Metformin was just started 5/8/25 AM, reduced Lispro to 3U TID from 8U   On 5/10/25, increased Lispro WM to 4U TID   BSs should continue to improve with steroid being tapered (LD will be 5/19)  On 5/12/25, increased Lispro WM to 6U TID  Will restart Januvia 50 mg qd tomorrow 5/14/25 and stop the Lantus tonight 5/13/25.  Keep Lispro WM same for today.  Continue DM diet

## 2025-05-13 NOTE — PCC SPEECH THERAPY
Pt currently being followed for cognitive linguistic tx sessions. Upon admission to the unit, pt was demonstrating fairly significant difficulty in providing own LT biographical information to where the Informal Cognitive Assessment was initiated only. Pt is demonstrating severely impaired deficits noted in the following areas: decreased attention, LT memory recall, ST memory recall , problem solving, reasoning, sequencing, organization of thoughts, judgement, slower processing, insight, and as well as likely deficits in language skills. Pt's overall attention towards tasks did fluctuate throughout assessment, but able to be redirected to tasks at hand. Throughout orientation and LT memory biographical review, pt exhibiting more deficits given cognitive skills, but when initiating more structured tasks, pt demonstrating more word finding deficits, as well as perseverations on words provided. Question true language skills vs cognitive deficits, most likely combination of both, currently impacting pt's overall cognitive functioning. In order to address the noted barriers, skilled SLP services will address this by targeting the following interventions: visual orientation checklist, memory book/memory packet, verbal problem solving task, visual memory recall tasks, drawing conclusions activities, written sequencing tasks, categorization tasks, picture problem solving activities, functional reading tasks, word deduction tasks and family education/training.     Current level of functioning:   Comprehension: mod A  Expression: mod A  Social Interaction: supervision-min A  Executive functions: total A  Memory: total A    Family training/education has been initiated with pt's spouse, Naldo very briefly but also children were present to provide initial education given role of services as well as skills being targeted. There will be a plan for family meeting later to d/w family about the recommendations for the need given 24/7  supervision/assistance at times of discharge due to the significant deficits of cognitive skills at this time. Recommendations at time of discharge are for continued skilled ST services pending disposition home vs OP.    This week, focus for continued services to target reorientation skills to current events, review of LTM biographical information, memory recall strategies, basic categorization tasks, basic problem solving skills. At this time, pt will continue to benefit from skilled cognitive linguistic tx session to maximize overall functional independence given overall cognitive linguistic skills in attempts to decreased caregiver burden over time.     Update from week: 5/15/2025. Pt is being followed for cognitive linguistic tx sessions to where pt is making slower and steady progress this week.     Continued cognitive barriers which present include: decreased attention, visual attention, LT memory recall, ST memory recall , problem solving, reasoning, sequencing, organization of thoughts, judgement, slower processing, insight, and as well as word finding deficits, which still impacts pt's overall safety, functional cognitive communication skills as well as functional mobility. The following interventions are used to target these barriers, including visual orientation checklist, verbal problem solving task, verbal working memory tasks, visual memory recall tasks, drawing conclusions activities, written sequencing tasks, categorization tasks , picture problem solving activities, verbal reasoning tasks, functional reading tasks, word deduction tasks and family education/training.     Current level of functioning:   Comprehension: min-mod A  Expression: min-mod A  Social Interaction: supervision  Executive functions: max-total A  Memory: total A    Family training/education has been initiated and will be ongoing with pt's spouse, Naldo. Spouse was recently in for ST session in which she was able to observe pt's  ability to complete structured basic reading comprehension tasks where pt does better vs verbal explanation of information. Will continue to review strategies w/ spouse to continue to maximize pt's cognitive skills as well as overall safety within the home. Recommendations at time of discharge are for continued skilled ST services but TBD if home vs OP services.    This week, focus for continued services to target ongoing review of orientation, LT biographical information, basic problem solving, basic reasoning, functional sequencing tasks, etc. At this time, pt will continue to benefit from skilled cognitive linguistic tx session to maximize overall functional independence given overall cognitive linguistic skills in attempts to decreased caregiver burden over time.     Update from week: 5/21/2025. Pt is being followed for cognitive linguistic tx sessions to where pt is making slower and steady progress this week.     Continued cognitive barriers which present include: decreased attention, visual attention, LT memory recall, ST memory recall , problem solving, reasoning, sequencing, organization of thoughts, judgement, slower processing, insight, and as well as word finding deficits, which still impacts pt's overall safety, functional cognitive communication skills as well as functional mobility. The following interventions are used to target these barriers, including visual orientation checklist, verbal problem solving task, verbal working memory tasks, visual memory recall tasks, drawing conclusions activities, written sequencing tasks, categorization tasks , picture problem solving activities, verbal reasoning tasks, functional reading tasks, word deduction tasks and family education/training.     Current level of functioning:   Comprehension: min-mod A  Expression: min-mod A  Social Interaction: supervision  Executive functions: max A  Memory: max A    Family training/education has been completed with spouse  Naldo. Recommendations for home are for 24hr S/A with all ADL/IADL tasks. Naldo receptive and is taking off work to accommodate. Reviewed memory strategies for home as well as activities to engage pt in which are good for overall cognition. Recommendations at time of discharge are for continued skilled ST services for cognition.    Pt is cleared for discharge pending medical stability. At this time, pt will continue to benefit from skilled cognitive linguistic tx session to maximize overall functional independence given overall cognitive linguistic skills in attempts to decreased caregiver burden over time

## 2025-05-14 LAB
GLUCOSE SERPL-MCNC: 118 MG/DL (ref 65–140)
GLUCOSE SERPL-MCNC: 153 MG/DL (ref 65–140)
GLUCOSE SERPL-MCNC: 161 MG/DL (ref 65–140)
GLUCOSE SERPL-MCNC: 243 MG/DL (ref 65–140)

## 2025-05-14 PROCEDURE — 97110 THERAPEUTIC EXERCISES: CPT

## 2025-05-14 PROCEDURE — 82948 REAGENT STRIP/BLOOD GLUCOSE: CPT

## 2025-05-14 PROCEDURE — 97530 THERAPEUTIC ACTIVITIES: CPT

## 2025-05-14 PROCEDURE — 97112 NEUROMUSCULAR REEDUCATION: CPT

## 2025-05-14 PROCEDURE — 97129 THER IVNTJ 1ST 15 MIN: CPT

## 2025-05-14 PROCEDURE — 99232 SBSQ HOSP IP/OBS MODERATE 35: CPT | Performed by: NURSE PRACTITIONER

## 2025-05-14 PROCEDURE — 99233 SBSQ HOSP IP/OBS HIGH 50: CPT | Performed by: STUDENT IN AN ORGANIZED HEALTH CARE EDUCATION/TRAINING PROGRAM

## 2025-05-14 PROCEDURE — 97130 THER IVNTJ EA ADDL 15 MIN: CPT

## 2025-05-14 RX ORDER — LACTULOSE 10 G/15ML
20 SOLUTION ORAL DAILY PRN
Status: DISCONTINUED | OUTPATIENT
Start: 2025-05-14 | End: 2025-05-28 | Stop reason: HOSPADM

## 2025-05-14 RX ADMIN — INSULIN LISPRO 6 UNITS: 100 INJECTION, SOLUTION INTRAVENOUS; SUBCUTANEOUS at 11:26

## 2025-05-14 RX ADMIN — INSULIN LISPRO 2 UNITS: 100 INJECTION, SOLUTION INTRAVENOUS; SUBCUTANEOUS at 21:15

## 2025-05-14 RX ADMIN — QUETIAPINE 12.5 MG: 25 TABLET ORAL at 11:26

## 2025-05-14 RX ADMIN — HEPARIN SODIUM 5000 UNITS: 5000 INJECTION INTRAVENOUS; SUBCUTANEOUS at 21:14

## 2025-05-14 RX ADMIN — DEXAMETHASONE 2 MG: 2 TABLET ORAL at 08:16

## 2025-05-14 RX ADMIN — INSULIN LISPRO 6 UNITS: 100 INJECTION, SOLUTION INTRAVENOUS; SUBCUTANEOUS at 07:23

## 2025-05-14 RX ADMIN — HEPARIN SODIUM 5000 UNITS: 5000 INJECTION INTRAVENOUS; SUBCUTANEOUS at 06:20

## 2025-05-14 RX ADMIN — HEPARIN SODIUM 5000 UNITS: 5000 INJECTION INTRAVENOUS; SUBCUTANEOUS at 14:44

## 2025-05-14 RX ADMIN — DOCUSATE SODIUM 100 MG: 100 CAPSULE, LIQUID FILLED ORAL at 16:20

## 2025-05-14 RX ADMIN — CHLORHEXIDINE GLUCONATE 15 ML: 1.2 SOLUTION ORAL at 08:16

## 2025-05-14 RX ADMIN — ATORVASTATIN CALCIUM 20 MG: 20 TABLET, FILM COATED ORAL at 08:16

## 2025-05-14 RX ADMIN — Medication 6 MG: at 21:14

## 2025-05-14 RX ADMIN — SITAGLIPTIN 50 MG: 50 TABLET, FILM COATED ORAL at 08:16

## 2025-05-14 RX ADMIN — QUETIAPINE 50 MG: 25 TABLET ORAL at 21:14

## 2025-05-14 RX ADMIN — LACTULOSE 20 G: 20 SOLUTION ORAL at 18:05

## 2025-05-14 RX ADMIN — DOCUSATE SODIUM 100 MG: 100 CAPSULE, LIQUID FILLED ORAL at 08:16

## 2025-05-14 RX ADMIN — DEXAMETHASONE 2 MG: 2 TABLET ORAL at 21:14

## 2025-05-14 RX ADMIN — SENNOSIDES 17.2 MG: 8.6 TABLET, FILM COATED ORAL at 11:26

## 2025-05-14 RX ADMIN — ALLOPURINOL 300 MG: 300 TABLET ORAL at 08:16

## 2025-05-14 RX ADMIN — INSULIN LISPRO 4 UNITS: 100 INJECTION, SOLUTION INTRAVENOUS; SUBCUTANEOUS at 11:26

## 2025-05-14 RX ADMIN — INSULIN LISPRO 2 UNITS: 100 INJECTION, SOLUTION INTRAVENOUS; SUBCUTANEOUS at 07:23

## 2025-05-14 RX ADMIN — CHLORHEXIDINE GLUCONATE 15 ML: 1.2 SOLUTION ORAL at 21:15

## 2025-05-14 RX ADMIN — METFORMIN ER 500 MG 1000 MG: 500 TABLET ORAL at 08:16

## 2025-05-14 RX ADMIN — PANTOPRAZOLE SODIUM 40 MG: 40 TABLET, DELAYED RELEASE ORAL at 06:20

## 2025-05-14 NOTE — PHYSICAL THERAPY NOTE
Attended family mtg today with Marah SLP, Pearl , Jr NOEL, Dr Kaminski, pt's wife Naldo who was present and dtr Elizabeth who was over the phone. Therapy disciplines provided updates. From a PT standpoint, reviewed recommending Maddy-CS for mobility based upon fluctuations that will occur due to fatigue, sleeping, meds and cognition. Reviewed after Marah SLP talked about cognitive changes, how this impacts his mobility. Overall he doesn't need much physical help for him to move, but depending on the task (such as cleaning up from an incontinent episode) he may need more physical assistance to complete the task if he is having difficulty sequencing through the steps. Reviewed recommendation for gait belt to help with balance/fall prevention. Discussed that he can benefit from a RW that can help provide balance support pending the situation, versus if Naldo/someone else is physically present to walk with him with a gait belt. Elizabeth asked about a rollator, reviewed pros and cons of this device; will practice with it here to assess effectiveness for home. Discussed DME process with insurance, that they cover 1 mobility device in a 5 year window, so if they think they may want to process a WC through insurance in this time frame, to purchase a RW on their own. Elizabeth declined FT for this weekend. Naldo will be here 1230 Saturday and will spend the night to see what help he may need through the night time. Notified RN after mtg so they can be prepared to help Naldo as needed overnight. Planning for dc next week pending how much FT needs to occur due to him needing someone home with him all of the time and in the same room, to provide S /help for all tasks.

## 2025-05-14 NOTE — OCCUPATIONAL THERAPY NOTE
Team Meeting w/ Family    Family meeting w/ wife, Naldo and pt's Elizabeth ordoñez occurred this AM. Marah Bello from ST, Mandie GUERRA from PT, Pearl GIORDANO were present for discussion. Meeting was held to discuss DC plan and rec from each discipline for home. CLOF and barriers being addressed w/ therapy include cog deficits, balance, and endurance. Rec that wife stay overnight to observe how pt fluctuates w/ behaviors and functional status t/o day/evening. From OT perspective, rec that wife observe and be hands-on w/ ADLs in order to receive training on VC and physical assistance needed for pt to be successful. Wife will be present over wknd to stay overnight and receive appropriate family training. Team will reconvene on monday to discuss how FT went over wknd, need for additional FT, and solidify tentative DC date of 5/22.

## 2025-05-14 NOTE — PROGRESS NOTES
05/14/25 1400   Pain Assessment   Pain Assessment Tool 0-10   Pain Score No Pain   Restrictions/Precautions   Precautions 1:1;Bed/chair alarms;Cognitive;Fall Risk;Impulsive;Supervision on toilet/commode   Comprehension   Comprehension (FIM) 3 - Understands basic info/conversation 50-74% of time   Expression   Expression (FIM) 3 - Expresses basic info/needs 50-74% of time   Social Interaction   Social Interaction (FIM) 5 - Interacts appropriately with others 90% of time   Problem Solving   Problem solving (FIM) 1 - Needs direction nearly all the time   Memory   Memory (FIM) 1 - Recognizes, recalls/performs less than 25%   Speech/Language/Cognition Assessmetn   Treatment Assessment Pt was in bed this session, noting more fatigue as from AM session. However, pt did have a visitor, friend Santo also present at session today. Pt was able to recall his name to introduce to SLP. SLP providing friend a brief overview of role of services as he was able to stay and observe session as well today. Targeting recent recall, to where pt was able to recount that spouse was in earlier today, but when asking pt about food items which he had on his tray today, pt unable to recall any item. SLP attempting to use description of food items on lunch tray (as SLP had setup tray for pt at lunch at end of ST session), to where this did not aid in his ability to recall any of the items consumed. Directive cues were needed as result. Otherwise, focus of session targeting functional verbal problem solving skills to where SLP provided pt w/ picture problem solving cards. Pt was to identify the problems which are not safe and pending how pt was able to identify the problems, also provide a solution to the problems. Ability for pt to ID the problems given the pictures provided was 2/8 accurate, increasing to 4/8 accurate when provided probing questions to elicit problems. For the remaining pictures, pt unable to recognize the problems within  them (ie: taking a hot pan out of oven, hot iron sitting on a shirt, slip/fall in tub, etc). SLP did have to provide directive cues for pt to ID the problems. As for pt's ability to then provide a solution to each problem was 3/8 accurate but w/ probing cues to determine solutions, increased to 6/8 accuracy. Pt did have difficulty in determining solutions to the 2 items to where again pt required directive cues. In comparison from AM session and as suspected, pt did exhibit more difficulty in completing verbal problem solving task vs where in AM session using written information does improve ability to ID items which were solutions. At this time, pt will continue to benefit from ongoing skilled SLP services at this time in attempts to decrease caregiver burden upon discharge home.   SLP Therapy Minutes   SLP Time In 1400   SLP Time Out 1430   SLP Total Time (minutes) 30   SLP Mode of treatment - Individual (minutes) 30   SLP Mode of treatment - Concurrent (minutes) 0   SLP Mode of treatment - Group (minutes) 0   SLP Mode of treatment - Co-treat (minutes) 0   SLP Mode of Treatment - Total time(minutes) 30 minutes   SLP Cumulative Minutes 330   Therapy Time missed   Time missed? No

## 2025-05-14 NOTE — ASSESSMENT & PLAN NOTE
Lab Results   Component Value Date    HGBA1C 6.6 (H) 02/22/2025       Recent Labs     05/13/25  1554 05/13/25 2050 05/14/25  0610 05/14/25  1043   POCGLU 159* 102 153* 243*       Blood Sugar Average: Last 72 hrs:  (P) [id number]    - Metformin 1000mg daily  - Lantus 10u daily with Lispro 8u TID and SSI  - Monitor accuchecks while on steroids  - Management per IM

## 2025-05-14 NOTE — PROGRESS NOTES
"   05/14/25 1100   Pain Assessment   Pain Assessment Tool 0-10   Pain Score No Pain   Restrictions/Precautions   Precautions 1:1;Bed/chair alarms;Cognitive;Fall Risk;Impulsive;Supervision on toilet/commode   Comprehension   Comprehension (FIM) 4 - Understands basic info/conversation 75-90% of time   Expression   Expression (FIM) 4 - Expresses basic info/needs 75-90% of time   Social Interaction   Social Interaction (FIM) 5 - Interacts appropriately with others 90% of time   Problem Solving   Problem solving (FIM) 2 - Needs direction more than ½ time to initiate, plan or complete simple tasks   Memory   Memory (FIM) 2 - Recognizes, recalls/performs 25-49%   Speech/Language/Cognition Assessmetn   Treatment Assessment Pt was in recliner asleep but able to be awakened by voice. When asking about being tired, pt did report that he was but did appear to recall current SLP as he stated \"I didn't get my questions yet.\" SLP stated to pt that the ST session hadn't began yet, where pt did have a smile towards the comment made. SLP targeting pt's reading comprehension skills but also given basic functional cognitive skills today. SLP initiated session by presenting pt w/ 4 words per list. Objective was for pt to ID the word which does not belong. Of note, SLP did setup pt w/ a paper to hide all words at once to increase overall attention and focus for line being targeted. Given this technique, this did aid pt in completing to where pt was 15/15 accurate given increased time to complete.     Since pt's reading comprehension skills were more successful today in session, SLP continued to target this in the next task presented. SLP now providing pt w/ a problem and then 4 possible solutions to each of the problem presented. Pt was to ID the BEST solutions to each problem presented. By this point in session, pt's spouse, Naldo came into to where SLP provided update in regard to his current abilities in today's session. She was able " to observe and read the items this were presented to pt. Throughout task, it was noted that if pt was not sure about a possible answer for a problem, SLP skipped and went back to at end of task. Pt was 13/19 accurate in his own ability to ID best solutions, but when SLP asking probing questions to pt, he was able to increase to 18/19 accuracy, noting 1 items being errored all together despite cues. SLP providing pt and spouse education on improvement in engagement toward reading comprehension skills to where this is a benefit for pt likely moving forward: using written information to maximize overall comprehension of information provided. In later session today, SLP will plan to complete verbal problem solving pictures which is suspected to be decreased in comparison from skills exhibited in AM session. SLP reviewing w/ spouse about FT planned for this Saturday as current SLP will be present to go in more depth about skills, strategies to maximize skills, safety for pt towards time of discharge. Currently pt will continue to benefit from ongoing skilled SLP services targeting functional cognitive skills in hopes for decreasing caregiver burden over time.     SLP Therapy Minutes   SLP Time In 1100   SLP Time Out 1130   SLP Total Time (minutes) 30   SLP Mode of treatment - Individual (minutes) 30   SLP Mode of treatment - Concurrent (minutes) 0   SLP Mode of treatment - Group (minutes) 0   SLP Mode of treatment - Co-treat (minutes) 0   SLP Mode of Treatment - Total time(minutes) 30 minutes   SLP Cumulative Minutes 300   Therapy Time missed   Time missed? No        Statement Selected

## 2025-05-14 NOTE — CASE MANAGEMENT
Family  meeting occurred with pts wife Naldo, pts daughter Elizabeth by phone, and in attendance from the treatment team, jolly rasmussen, slp; jolly ulloa pt; efrain zhao, serafin and cm .  [id number] was available for pts wife as needed. Each discipline addressed pts currently medical status, functional ability and what they are working on to safely dc pt to home. Naldo scheduled for family training and an overnight stay this coming Saturday. Elizabeth does not feel she requires additional training at this time. Dc tenatively scheduled for 5/22 to home. Therapy addressed potential dme needs. Cm following for contd stay review this Friday and dc planning needs.

## 2025-05-14 NOTE — ASSESSMENT & PLAN NOTE
Hemoglobin A1C was 6.6 on 2/22/25  Sees [facility] in [place]  Home:  Janumet XR  qd  Here: Lantus 10 U qhs/Lispro 6 U TID/Metformin 1000 mg qd  Insulin wasn't here 5/8 AM so didn't get Lispro with breakfast which explains why lunch BS was elevated.   Metformin was ordered on transfer to be restarted 5/8/25 AM  5/8/25:  BS at dinnertime 195, evening of 5/7/25 was 250.  Since Metformin was just started 5/8/25 AM, reduced Lispro to 3U TID from 8U   On 5/10/25, increased Lispro WM to 4U TID   BSs should continue to improve with steroid being tapered (LD will be 5/19)  On 5/12/25, increased Lispro WM to 6U TID  Restarted Januvia 50 mg qd today 5/14/25 and stopped the Lantus last night 5/13/25.  Kept Lispro WM same.  Continue DM diet

## 2025-05-14 NOTE — PROGRESS NOTES
Progress Note - PMR   Name: Alexis Dennison 64 y.o. 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  > Started on high dose steroids and methotrexate every 2 weeks for 4 weeks (C1 received 4/18, C2 received 5/2) then maintenance every 4 weeks with Rituximab weekly for 8 weeks (1st dose Thursday 4/24 and second 5/3)  > Admitted to [facility] with ongoing cognitive deficits and delirium    - Continue Decadron 2 mg Q12 on taper  - Continue MTX + Rituximab as per Hem/onc  - Daily methotrexate levels  - Hem/Onc following  - NSGY following  - SLP for cognition  Type 2 diabetes mellitus with hyperglycemia, without long-term current use of insulin (HCC)  Lab Results   Component Value Date    HGBA1C 6.6 (H) 02/22/2025       Recent Labs     05/13/25  1554 05/13/25  2050 05/14/25  0610 05/14/25  1043   POCGLU 159* 102 153* 243*       Blood Sugar Average: Last 72 hrs:  (P) [id number]    - Metformin 1000mg daily  - Lantus 10u daily with Lispro 8u TID and SSI  - Monitor accuchecks while on steroids  - Management per IM  HTN, goal below 130/80  > Blood pressure controlled off antihypertensives    - Monitor VS  - Management per IM  Mixed hyperlipidemia  - Continue atorvastatin 10 mg daily  Thyroid nodule    Pulmonary nodule  > CTA 3/29: nonspecific 4 mm RLL pulmonary nodule   - follow up outpatient for repeat imaging in 3 months  Liver lesion  >  CTA 3/29- nonspecific 12mm hypodense right hepatic lesion, recommended MRI abdomen  > MRI abdomen 3/30- simple right hepatic lobe cyst    - Follow up outpatient for monitoring  Ambulatory dysfunction  - Fall precautions  - PT/OT  Encephalopathy  > Patient has been pleasantly confused for months in the setting of brain mass.  > acutely worsened due to UTI and bacteremia. S/p 7 day course of antibx  > Continues to lack insight to his current situation and is severely cognitively impaired.  He does not get agitated for long periods of time and is mostly confused and impulsive.  > A&O 1 on [facility] admission    - Continue seroquel 12.5mg at noon with 50mg HS  - PRN Zyprexa 2.5mg IM as needed for agitation  -Overstimulation precautions, frequent re-orientation, re-direction, re-assurance  -Sleep log and agitation monitoring if needed  -Optimize sleep-wake cycle  -Limit sedating medications when possible  - Ensure optimal management electrolytes, nutrition, and hydration  - Ensure optimal bowel/bladder management  - Ensure optimal pain management   -Patient/family/caregiver education and training   -Continue 1:1 due to overall safety, impulsivity, lack of safety awareness and poor insight.  -Fall precautions with frequent rounding; proactive toileting program, patient should not be unattended in bathroom      LETY (acute kidney injury) (HCC)  > Baseline Cr 0.9  > Recently 1.3 improved from before- peak 2.7  > Now s/p bicarb    - IVF per IM/Nephro  - Management at discretion of nephro   E. coli UTI  > Pet met sepsis criteria on 4/29 with confusion and agitation. UA was positive. Urine culture and blood cultures showed Ecoli sensitive to cephalosporins.  > ID consulted and pt completed 7d course of ceftriaxone  > Approved to restart chemo 5/2.    - Monitor for any recurrence of agitation or new fevers/signs of infection  Impaired mobility and activities of daily living  Patient was evaluated by the rehabilitation team MD and an  appropriate candidate for acute inpatient rehabilitation program at this time.  The patient will tolerate 3 hours/day 5 to 7 days/week of intensive physical, occupational in order to obtain goals for community discharge  Due to the patient's functional Compared to their baseline level of function in addition to their ongoing medical needs, the patient would benefit from daily supervision from a rehabilitation physician as well as rehabilitation nursing to implement and adjust the medical as well as functional plan of care in order to meet the patient's goals.  HTN (hypertension)  Had been on losartan but holding at this time was her blood pressures are good  Transaminitis  CMP checked on 5/9 and AST is down to 57 from 111 and ALT to 281 from 322  ALT and AST were elevated  Hyponatremia  Sodium improved from 132 up to 136 today on 5/12  Thrombocytopenia (HCC)  Platelets down to 100 17K will need to continue monitoring while on chemotherapy  Sepsis (HCC)    E coli bacteremia    Leukopenia    At risk for acid-base imbalance    Electrolyte imbalance risk      Subjective   Patient is a 64-year-old male with history of type 2 diabetes, GERD, hyperlipidemia and hypertension on nonalcoholic fatty liver disease and sleep apnea who presents to [facility] on 3/29/2025 for increasing confusion. He had been recently diagnosed with a brain mass and family reported that he had been having increased symptoms including forgetfulness over the past several months. Initial workup was negative however he was seen in the ER on 3/24 and a CT of the head was concerning for brain lesion. 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 Additional areas of enhancement in the basal ganglia on the left as well as the left cerebral peduncle/left quirino. There was an initial plan to have an outpatient neurosurgery evaluation but because of his worsening  symptoms including visual deficits he would came to the ER sooner. A CT of the chest abdomen pelvis was completed on 3/29 showing a right lower lobe pulmonary nodule and nonspecific hepatic lesion and MRI was completed revealing a simple right hepatic lobe cyst. A diagnostic lumbar puncture was completed on 3/31 suspicious for CNS lymphoma and a repeat CT completed on 4/3 showed worsening hydrocephalus and MRI on 4/11 showed disease progression. Biopsy was done and EVD placed on 4/14 and the patient self extubated postprocedure. The preliminary results from the biopsy were indicative of a small round blue cell tumor suggestive of non-Hodgkin's lymphoma. He was started on high-dose steroids, methotrexate and rituximab by oncology but did have a complicated course including LETY, intermittent agitated delirium requiring medications as well as continuous observation as well as an E. coli UTI with bacteremia status post course of antibiotics. The EVD was removed on 4/26.     Chief Complaint: f/u ambulatory dysfunction    Interval: Patient seen and evaluated in room without any acute issues overnight.  We did have a family meeting today with the wife mahi, daughter Elizabeth and the entire therapy, case management and physician team to discuss the overall plan including family training and setting up overnight training with wife in order to see how to manage the cognitive deficits.  We did state we anticipate more than 1 session would be needed in order to have a safe community discharge.  All questions were answered however there were some specifics about his overall prognosis which I defer to oncology.  We had asked if they were able to be present during the meeting and stated that they would call the patient's family separately. Patient denies fever, chills, nausea, emesis, cough, shortness of breath, diarrhea, or constipation. Sleep was fine, mood stable. Pain is controlled.      Objective :  Temp:  [97.6 °F (36.4  °C)-97.9 °F (36.6 °C)] 97.9 °F (36.6 °C)  HR:  [72-80] 72  BP: (115-133)/(72) 133/72  Resp:  [17-18] 18  SpO2:  [97 %] 97 %  O2 Device: None (Room air)    Functional Update:  Physical Therapy Occupational Therapy Speech Therapy   Weight Bearing Status: Full Weight Bearing  Transfers: Incidental Touching  Bed Mobility: Supervision  Amulation Distance (ft): 150 feet  Ambulation: Incidental Touching  Assistive Device for Ambulation: Other (none)  Number of Stairs: 12  Assistive Device for Stairs: Right Hand Rail  Ramp: Incidental Touching  Assistive Device for Ramp: None  Discharge Recommendations: Home with:  DC Home with:: 24 Hour Supervision, Family Support, Outpatient Physical Therapy   Eating:  (setup)  Grooming: Incidental Touching  Bathing: Minimal Assistance  Bathing: Minimal Assistance  Upper Body Dressing: Supervision  Lower Body Dressing: Minimal Assistance, Moderate Assistance  Toileting: Minimal Assistance  Toilet Transfer: Incidental Touching, Minimal Assistance  Cognition: Exceptions to WNL  Cognition: Decreased Memory, Behavioral Considerations, Impulsive, Decreased Executive Functions, Decreased Attention, Decreased Comprehension, Decreased Safety  Orientation: Person   Mode of Communication: Verbal  Speech/Language:  (does demonstrate word finding)  Cognition: Exceptions to WNL  Cognition: Decreased Memory, Decreased Executive Functions, Decreased Attention, Decreased Comprehension, Decreased Safety, Impulsive  Orientation: Person  Discharge Recommendations: Home with:  DC Home with:: 24 Hour Supervision, 24 Hour Assisteance, Family Support, Home Speech Therapy, Outpatient Speech Therapy (pending pt progress)       Physical Exam  Vitals reviewed.   Constitutional:       Appearance: Normal appearance. He is normal weight.   HENT:      Head: Normocephalic.      Comments: Anterior cranial scalp healing with dried blood/scabbing over the area     Right Ear: External ear normal.      Left Ear: External ear  normal.      Nose: Nose normal. No rhinorrhea.      Mouth/Throat:      Mouth: Mucous membranes are moist.      Pharynx: Oropharynx is clear.     Eyes:      General: No scleral icterus.      Cardiovascular:      Rate and Rhythm: Normal rate.   Pulmonary:      Effort: Pulmonary effort is normal. No respiratory distress.      Breath sounds: Normal breath sounds.   Abdominal:      General: There is no distension.      Palpations: Abdomen is soft.     Musculoskeletal:      Right lower leg: No edema.      Left lower leg: No edema.     Skin:     General: Skin is warm and dry.     Neurological:      Mental Status: He is alert.      Motor: No weakness.      Gait: Gait abnormal.      Comments: Oriented to self only with limited insight or safety awareness   Psychiatric:         Mood and Affect: Mood normal.         Behavior: Behavior normal.           Scheduled Meds:  Current Facility-Administered Medications   Medication Dose Route Frequency Provider Last Rate    acetaminophen  650 mg Oral Q6H PRN Crispin Arana MD      allopurinol  300 mg Oral Daily Crispin Arana MD      alteplase  2 mg Intracatheter Q1MIN PRN Crispin Arana MD      alteplase  2 mg Intracatheter Q1MIN PRN Magali Lee MD      aluminum-magnesium hydroxide-simethicone  30 mL Oral Q4H PRN Crispin Arana MD      atorvastatin  20 mg Oral Daily Crispin Arana MD      bisacodyl  10 mg Rectal Daily PRN Jessica Arreola DO      calcium carbonate  1,000 mg Oral Daily PRN Crispin Arana MD      chlorhexidine  15 mL Mouth/Throat Q12H CAIT Arana MD      dexamethasone  2 mg Oral Q12H CAIT Arana MD      Followed by    [START ON 5/15/2025] dexamethasone  1 mg Oral Q12H CAIT Arana MD      docusate sodium  100 mg Oral BID Jessicakatherin Arreola DO      heparin (porcine)  5,000 Units Subcutaneous Q8H CAIT Arana MD      insulin lispro  2-12 Units Subcutaneous 4x Daily (AC & HS) Crispin Arana MD      insulin lispro  6 Units Subcutaneous TID  With Meals PATI Porras      lactulose  20 g Oral Daily PRN Joshua Kaminski, DO      melatonin  6 mg Oral HS Crispin Arana MD      metFORMIN  1,000 mg Oral Daily Crispin Arana MD      OLANZapine  5 mg Intramuscular BID PRN Crispin Arana MD      ondansetron  4 mg Intravenous Q6H PRN Crispin Arana MD      oxyCODONE  2.5 mg Oral Q4H PRN Crispin Arana MD      Or    oxyCODONE  5 mg Oral Q4H PRN Crispin Arana MD      pantoprazole  40 mg Oral Early Morning Crispin Arana MD      polyethylene glycol  17 g Oral Daily PRN Jessica T Arreola, DO      QUEtiapine  12.5 mg Oral Daily Crispin Arana MD      QUEtiapine  50 mg Oral HS Crispin Arana MD      senna  2 tablet Oral Daily With Lunch Jessica BHUPENDRA Arreola, DO      sitaGLIPtin  50 mg Oral Daily PATI Porras      sodium chloride  20 mL/hr Intravenous Once PRN Crispin Arana MD      sodium chloride  20 mL/hr Intravenous Once PRN Magali Lee MD           Lab Results: I have reviewed the following results:  Results from last 7 days   Lab Units 05/12/25  0614 05/09/25  0512 05/08/25  0552   HEMOGLOBIN g/dL 9.3* 8.8* 8.6*   HEMATOCRIT % 26.4* 24.7* 24.2*   WBC Thousand/uL 5.16 3.36* 2.63*   PLATELETS Thousands/uL 117* 160 195     Results from last 7 days   Lab Units 05/12/25  0614 05/10/25  1023 05/09/25  0512   BUN mg/dL 33* 34* 35*   SODIUM mmol/L 136 132* 138   POTASSIUM mmol/L 4.3 4.4 4.5   CHLORIDE mmol/L 101 98 101   CREATININE mg/dL 1.11 1.15 1.18   AST U/L 14 19 57*   ALT U/L 103* 187* 281*                Joshua Kaminski DO  Physical Medicine and Rehabilitation  [facility]    I have spent a total time of 60 minutes in caring for this patient on the day of the visit/encounter including Counseling / Coordination of care, Documenting in the medical record, Reviewing/placing orders in the medical record (including tests, medications, and/or procedures), and Communicating with other healthcare professionals .  Additional  time spent during the family meeting with required use of

## 2025-05-14 NOTE — PROGRESS NOTES
"OT daily tx note     05/14/25 1300   Pain Assessment   Pain Assessment Tool 0-10   Pain Score No Pain   Restrictions/Precautions   Precautions 1:1;Bed/chair alarms;Cognitive;Fall Risk;Impulsive;Supervision on toilet/commode   Weight Bearing Restrictions No   ROM Restrictions No   Lifestyle   Autonomy \"I just went fishing this morning\"   Exercise Tools   Other Exercise Tool 1 Pt engaged in UB strengthening using 3# for 10 x 3 reps with Good tolerance. Pt completed bicep curls, shoulder raises, chest press. Pt educated on energy conservation techniques and utilized rest breaks as needed. Pt benefits from activity to improve UB strengthening, activity tolerance, and endurance to facilitate independence w/ ADL/IADLs.   Cognition   Overall Cognitive Status Impaired   Arousal/Participation Alert;Cooperative   Attention Attends with cues to redirect   Orientation Level Oriented to person;Disoriented to place;Disoriented to time;Disoriented to situation   Memory Decreased recall of biographical information;Decreased recall of recent events;Decreased long term memory;Decreased short term memory;Decreased recall of precautions   Following Commands Follows one step commands with increased time or repetition   Comments Pt engaged in functional cognition activity using memory game and visual perceptual activity. Memory game explained w/ demo of activity and max VC needed t/o task for instruction. Pt demo poor carryover and limited sustained attention. Visual perceptual activity using connect the dots to copy drawing. Pt completed w/ min VC and fair accuracy to original meaning there may be a visual component to cog deficits. Pt benefits from activity to improve functional cog including STM, working mem, attention, visual scanning to facilitate ind w/ insight into deficits and ADL/IADLs.   Activity Tolerance   Activity Tolerance Patient tolerated treatment well   Assessment   Treatment Assessment Pt engaged in skilled OT session " with focus on Functional Transfers, Functional Cognition, Functional Attention, Short Term memory, Gross motor strengthening , Visual perceptual skills, and Visual scanning. Pt is limited by weakness, impaired balance, decreased endurance, increased fall risk, decreased ADLS, decreased IADLS, decreased activity tolerance, decreased safety awareness, impaired judgement, and decreased cognition. Cog activities completed during session as pt not motivated to participate in therapy but wishing to stay in bed. Pt fatigued this PM and imp cog deficits include oriented to person only, dec LTM/STM, dec insight, limited attention, and safety awareness. UB strengthening completed to improve endurance and strength to assist w/ ADL/balance/fatigue mgmt. OT services are warranted to address above barriers.   Prognosis Fair   Problem List Decreased strength;Decreased range of motion;Decreased endurance;Impaired balance;Decreased coordination;Decreased mobility;Decreased cognition;Impaired judgement;Decreased safety awareness   Barriers to Discharge Inaccessible home environment;Decreased caregiver support   Plan   Treatment/Interventions ADL retraining;Functional transfer training;Therapeutic exercise;Endurance training   Progress Progressing toward goals   OT Therapy Minutes   OT Time In 1300   OT Time Out 1400   OT Total Time (minutes) 60   OT Mode of treatment - Individual (minutes) 60   OT Mode of treatment - Concurrent (minutes) 0   OT Mode of treatment - Group (minutes) 0   OT Mode of treatment - Co-treat (minutes) 0   OT Mode of Treatment - Total time(minutes) 60 minutes   OT Cumulative Minutes 525   Therapy Time missed   Time missed? No

## 2025-05-14 NOTE — PROGRESS NOTES
05/14/25 1540   Pain Assessment   Pain Score No Pain   Restrictions/Precautions   Precautions Bed/chair alarms;1:1;Cognitive;Fall Risk;Supervision on toilet/commode   Cognition   Arousal/Participation Cooperative;Lethargic   Subjective   Subjective Alexis reported his stomach feels upset and has been using the bathroom. Notified RN   Other Comments   Comments Alexis didnt feel up to getting out of bed, so reviewed education about family meeting held today. Discussed planning for d/c next week but unsure of what date yet pending progress and FT. Reviewed plan to do FT with his wife saturday with all therpy disciplines. reviewed will practice with rollator as well as RW to see how either may help him at home to provide recommendations to him and his family. he asked about grocery shopping at home; reivewed recommendations for things like online grocery shopping/delivery to help him and his wife manage household tasks at home, or delivery  where a store member can bring their groceries outside. Will cont to review these recommendaitons with family.   Assessment   Treatment Assessment PT POC cont to focus on progressing BLE strength, activity tolerance, and dynamic balacne to progress towards safe dc home. To practice with rollator tomorrow to assess effectivenss/any safety precations with this device as family was asking about this in the family mtg earlier today. To cont to monitor response to exercise and how he moves when fatigued to cont to give approrpiate recommendations for home.   PT Barriers   Physical Impairment Decreased strength;Decreased range of motion;Decreased endurance;Impaired balance;Decreased mobility;Decreased safety awareness;Impaired judgement;Decreased cognition   Functional Limitation Car transfers;Stair negotiation;Standing;Transfers;Walking   Plan   Treatment/Interventions LE strengthening/ROM;Functional transfer training;Elevations;Therapeutic exercise;Endurance training;Cognitive  reorientation;Patient/family training;Equipment eval/education;Bed mobility;Gait training   PT Therapy Minutes   PT Time In 1540   PT Time Out 1548   PT Total Time (minutes) 8   PT Mode of treatment - Individual (minutes) 8   PT Mode of treatment - Concurrent (minutes) 8   PT Mode of treatment - Group (minutes) 8   PT Mode of treatment - Co-treat (minutes) 8   PT Mode of Treatment - Total time(minutes) 32 minutes   PT Cumulative Minutes 446   Therapy Time missed   Time missed? No

## 2025-05-14 NOTE — PROGRESS NOTES
"   05/14/25 0836   Pain Assessment   Pain Assessment Tool 0-10   Pain Score No Pain   Restrictions/Precautions   Precautions 1:1;Bed/chair alarms;Cognitive;Fall Risk;Impulsive;Supervision on toilet/commode   Weight Bearing Restrictions No   ROM Restrictions No   Cognition   Overall Cognitive Status Impaired   Arousal/Participation Alert;Cooperative   Attention Attends with cues to redirect   Orientation Level Oriented to person;Disoriented to place;Disoriented to time;Disoriented to situation   Memory Decreased recall of biographical information;Decreased recall of recent events;Decreased long term memory;Decreased short term memory;Decreased recall of precautions   Following Commands Follows one step commands with increased time or repetition   Subjective   Subjective \"where's my egg roll? I gave it to you\".   Sit to Stand   Type of Assistance Needed Supervision   Comment CS no AD/RW   Sit to Stand CARE Score 4   Bed-Chair Transfer   Type of Assistance Needed Supervision;Adaptive equipment   Comment CS no AD/RW   Chair/Bed-to-Chair Transfer CARE Score 4   Transfer Bed/Chair/Wheelchair   Adaptive Equipment Roller Walker;None   Car Transfer   Type of Assistance Needed Supervision;Verbal cues   Comment no AD, CS with VC's for sequencing.   Car Transfer CARE Score 4   Walk 10 Feet   Type of Assistance Needed Supervision;Adaptive equipment   Comment CS with RW   Walk 10 Feet CARE Score 4   Walk 50 Feet with Two Turns   Type of Assistance Needed Supervision;Adaptive equipment   Comment CS with RW   Walk 50 Feet with Two Turns CARE Score 4   Walk 150 Feet   Type of Assistance Needed Supervision;Adaptive equipment   Comment CS with RW   Walk 150 Feet CARE Score 4   Ambulation   Primary Mode of Locomotion Prior to Admission Walk   Distance Walked (feet) 150 ft  (x2, 350')   Assist Device Roller Walker  (none)   Gait Pattern Inconsistant Vicki;Slow Vicki;Decreased foot clearance;Wide IRMA;Shuffle;Improper weight " "shift;Lateral deviation   Limitations Noted In Balance;Coordination;Heel Strike;Safety;Speed;Strength;Swing   Provided Assistance with: Direction;Balance   Walk Assist Level Close Supervision;Contact Guard;Minimum Assist   Does the patient walk? 2. Yes   Wheelchair mobility   Does the patient use a wheelchair? 0. No   Curb or Single Stair   Style negotiated Curb   Type of Assistance Needed Physical assistance;Verbal cues   Physical Assistance Level 25% or less   Comment CAROLE no AD, CS/CGA with RW over 6\" curb   1 Step (Curb) CARE Score 3   4 Steps   Type of Assistance Needed Supervision;Adaptive equipment   Comment CS with B hands on RHR ascending   4 Steps CARE Score 4   12 Steps   Type of Assistance Needed Supervision;Adaptive equipment   Comment CS with B hands on RHR ascending   12 Steps CARE Score 4   Stairs   Type Curb;Stairs   # of Steps 12   Weight Bearing Precautions Fall Risk   Assist Devices Single Rail;Roller Walker   Toilet Transfer   Type of Assistance Needed Supervision   Comment CS   Toilet Transfer CARE Score 4   Toilet Transfer   Surface Assessed Standard Toilet   Therapeutic Interventions   Neuromuscular Re-Education Attempted HIGT no AD with CGA but pt unable to have large change in velocity with gait x350'. When educated on why we wanted him to amb quiker he still was unable to process why. Alt toe taps 2 x10 reps BLE CG to MODA for balance. Noted poor righting reactions   Equipment Use   NuStep x10 min BLE/UE L2   Other Comments   Comments (S)  brief donned start of session 2/2 hx of urinary incontinence   Assessment   Treatment Assessment Pt participated in skilled PT session with increased focus on balance, gait, endurance, safety awareness, righting reactions and carryover of new learning. Pt cont to require cont VC's for safety, balance and awareness throughout session. Pt speaking incoherently this session asking for his egg roll and \"how old are these balance activities\"? Attempted to " have pt express what exactly he was asking at time but was unable to be redirected. Pt will cont POC as tolerated with cont focus on gait, balance, increased cognition, increased righting reactions, increased carryover of new learning and stair management to decrease burden of care.   Problem List Decreased strength;Decreased range of motion;Decreased endurance;Impaired balance;Decreased coordination;Decreased mobility;Decreased cognition;Impaired judgement;Decreased safety awareness   Barriers to Discharge Inaccessible home environment;Decreased caregiver support   PT Barriers   Functional Limitation Car transfers;Stair negotiation;Standing;Transfers;Walking   Plan   Treatment/Interventions Functional transfer training;LE strengthening/ROM;Therapeutic exercise;Endurance training;Cognitive reorientation;Patient/family training;Gait training   Progress Progressing toward goals   PT Therapy Minutes   PT Time In 0836   PT Time Out 0930   PT Total Time (minutes) 54   PT Mode of treatment - Individual (minutes) 54   PT Mode of treatment - Concurrent (minutes) 0   PT Mode of treatment - Group (minutes) 0   PT Mode of treatment - Co-treat (minutes) 0   PT Mode of Treatment - Total time(minutes) 54 minutes   PT Cumulative Minutes 414   Therapy Time missed   Time missed? No

## 2025-05-14 NOTE — ASSESSMENT & PLAN NOTE
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

## 2025-05-14 NOTE — PROGRESS NOTES
"Progress Note - Hospitalist   Name: Alexis Dennison 64 y.o. male I MRN: [id number]  Unit/Bed#: -01 I Date of Admission: 5/7/2025   Date of Service: 5/14/2025 I Hospital Day: 7    Assessment & Plan  Primary central nervous system (CNS) lymphoma  Patient with development of worsening confusion, and was seen in the ED on 3/24/2025.  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  Lymphoma/leukemia panel was nondiagnostic  CTH 4/3/25: concerning for worsening hydrocephalus.   Repeat LP 4/7/25 = undiagnostic again   MRI brain 4/11/25: \"Interval enlargement of the dominant left anterior basal ganglionic mass lesion with greater surrounding vasogenic edema and mass effect. 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  S/p EVD, self removed 4/26  S/p Keppra 500mg BID x 7 days for postoperative seizure ppx   Started on chemotherapy during hospital stay and is for weekly Rituximab and Methotrexate every 2 weeks  Daily serum methotrexate levels until level is less than 0.1  Per heme-onc monitoring urine pH and Methotrexate levels do not need further monitoring at this time   Continue Dexamethasone taper = LD will be 5/19/25  Maintain PICC line  H-O following  Had Rituxan 5/10/25  Type 2 diabetes mellitus with hyperglycemia, without long-term current use of insulin (HCC)  Hemoglobin A1C was 6.6 on 2/22/25  Sees End[facility] in [place]  Home:  Janumet XR  qd  Here: Lantus " "10 U qhs/Lispro 6 U TID/Metformin 1000 mg qd  Insulin wasn't here 5/8 AM so didn't get Lispro with breakfast which explains why lunch BS was elevated.   Metformin was ordered on transfer to be restarted 5/8/25 AM  5/8/25:  BS at dinnertime 195, evening of 5/7/25 was 250.  Since Metformin was just started 5/8/25 AM, reduced Lispro to 3U TID from 8U   On 5/10/25, increased Lispro WM to 4U TID   BSs should continue to improve with steroid being tapered (LD will be 5/19)  On 5/12/25, increased Lispro WM to 6U TID  Restarted Januvia 50 mg qd today 5/14/25 and stopped the Lantus last night 5/13/25.  Kept Lispro WM same.  Continue DM diet  Mixed hyperlipidemia  Continue atorvastatin  Thyroid nodule  TSH/free T4 4/21/25 = 0.019/1.13  Nonemergent outpatient endocrine follow-up.   Will require outpatient TSH, free T4, +/- further imaging with endocrine such as radioactive uptake  Repeat TSH/free T4 and add on to 5/12's labs 5/12/25 = TSH was 0.287 with free T4 0.85  Pulmonary nodule  CT chest 3 months follow-up  Liver lesion  Patient will require outpatient follow-up  Encephalopathy  Improving  Continue Seroquel  Continue steroid taper  On 1:1  LETY (acute kidney injury) (HCC)  Monitor status post methotrexate  Previous baseline as OP 5/2023-2/22/25 was 1.1 to 1.0  CMP 5/9/25 with creatinine of 1.18 down from 1.23 on 5/8 and on 5/10 was 1.15  CMP on 5/12/25 showed creatinine stable at 1.1  Sepsis (HCC)  Resolved  Continue to monitor off antibiotics  E coli bacteremia  Patient completed 7 days of IV cefazolin which was finished on May 5  Was due to E. Coli UTI  HTN (hypertension)  Home:  Losartan -25 qd  Here:  no meds for now since he is normotensive  Transaminitis  AST is 111, previously 114,  ALT is 322 previously 149  D/w H-O, they are aware =  \"Could be due to recent chemotherapy versus antibiotics versus liver lesions\".    CMP 5/9/25 = AST 57 (previously 111),  (previously 322)  Almost totally resolved now " with AST 57 and   Leukopenia  WBC is stable at 5.9 on 5/12/25  H-O following  Hyponatremia  Mild   CMP 5/12/25 now shows resolution of the hyponatremia  Thrombocytopenia (HCC)  Platelet count down to 117 from 160 on 5/12/25  ?from chemo  Per H-O    The above assessment and plan was reviewed and updated as determined by my evaluation of the patient on 5/14/2025.    History of Present Illness   Patient seen and examined. Patients overnight issues or events were reviewed with nursing staff. New or overnight issues include the following:   No new or overnight issues.  Offers no complaints    Review of Systems   All other systems reviewed and are negative.      Objective :  Temp:  [97.6 °F (36.4 °C)-97.9 °F (36.6 °C)] 97.9 °F (36.6 °C)  HR:  [72-80] 72  BP: (115-133)/(72) 133/72  Resp:  [17-18] 18  SpO2:  [97 %] 97 %  O2 Device: None (Room air)    Invasive Devices       Peripherally Inserted Central Catheter Line  Duration             PICC Line 05/01/25 Left Brachial 12 days              Drain  Duration             External Urinary Catheter 11 days                    Physical Exam  General Appearance: no distress, non toxic appearing  HEENT: PERRLA, conjuctiva normal; oropharynx clear; mucous membranes moist   Neck:  Supple, normal ROM  Lungs: CTA, normal respiratory effort, no retractions, expiratory effort normal  CV: regular rate and rhythm; no rubs/murmurs/gallops, PMI normal   ABD: soft; ND/NT; +BS  EXT: no edema  Skin: normal turgor, normal texture  Psych: affect normal, mood normal  Neuro: AA        The above physical exam was reviewed and updated as determined by my evaluation of the patient on 5/14/2025.      Lab Results: I have reviewed the following results:  Results from last 7 days   Lab Units 05/12/25  0614 05/09/25  0512   WBC Thousand/uL 5.16 3.36*   HEMOGLOBIN g/dL 9.3* 8.8*   HEMATOCRIT % 26.4* 24.7*   PLATELETS Thousands/uL 117* 160     Results from last 7 days   Lab Units 05/12/25  0614  05/10/25  1023   SODIUM mmol/L 136 132*   POTASSIUM mmol/L 4.3 4.4   CHLORIDE mmol/L 101 98   CO2 mmol/L 30 25   BUN mg/dL 33* 34*   CREATININE mg/dL 1.11 1.15   CALCIUM mg/dL 9.0 9.1             Results from last 7 days   Lab Units 05/14/25  1043 05/14/25  0610 05/13/25  2050   POC GLUCOSE mg/dl 243* 153* 102       Imaging Results Review: No pertinent imaging studies reviewed.  Other Study Results Review: No additional pertinent studies reviewed.    Review of Scheduled Meds: Medications  reviewed and reconciled as needed  Current Facility-Administered Medications   Medication Dose Route Frequency Provider Last Rate    acetaminophen  650 mg Oral Q6H PRN Crispin Arana MD      allopurinol  300 mg Oral Daily Crispin Arana MD      alteplase  2 mg Intracatheter Q1MIN PRN Crispin Arana MD      alteplase  2 mg Intracatheter Q1MIN PRN Magali Lee MD      aluminum-magnesium hydroxide-simethicone  30 mL Oral Q4H PRN Crispin Arana MD      atorvastatin  20 mg Oral Daily Crispin Arana MD      bisacodyl  10 mg Rectal Daily PRN Jessica Arreola, DO      calcium carbonate  1,000 mg Oral Daily PRN Crispin Arana MD      chlorhexidine  15 mL Mouth/Throat Q12H [facility] Crispin Arana MD      dexamethasone  2 mg Oral Q12H CAIT Arana MD      Followed by    [START ON 5/15/2025] dexamethasone  1 mg Oral Q12H CAIT Arana MD      docusate sodium  100 mg Oral BID Jessica BHUPENDRA Arreola, DO      heparin (porcine)  5,000 Units Subcutaneous Q8H CAIT Arana MD      insulin lispro  2-12 Units Subcutaneous 4x Daily (AC & HS) Crispin Arana MD      insulin lispro  6 Units Subcutaneous TID With Meals PATI Porras      lactulose  20 g Oral Daily PRN Joshua Kaminski, DO      melatonin  6 mg Oral HS Crispin Arana MD      metFORMIN  1,000 mg Oral Daily Crispin Arana MD      OLANZapine  5 mg Intramuscular BID PRN Crispin Arana MD      ondansetron  4 mg Intravenous Q6H PRN Crispin Arana MD      oxyCODONE  2.5 mg  Oral Q4H PRN Crispin Arana MD      Or    oxyCODONE  5 mg Oral Q4H PRN Crispin Arana MD      pantoprazole  40 mg Oral Early Morning Crispin Arana MD      polyethylene glycol  17 g Oral Daily PRN Jessica T Arreola, DO      QUEtiapine  12.5 mg Oral Daily Crispin Arana MD      QUEtiapine  50 mg Oral HS Crispin Arana MD      senna  2 tablet Oral Daily With Lunch Jessica Arreola, DO      sitaGLIPtin  50 mg Oral Daily PATI Porras      sodium chloride  20 mL/hr Intravenous Once PRN Crispin Arana MD      sodium chloride  20 mL/hr Intravenous Once PRN Magali Lee MD         VTE Pharmacologic Prophylaxis: HSQ  Code Status: Level 1 - Full Code  Current Length of Stay: 7 day(s)    Administrative Statements     ** Please Note:  voice to text software may have been used in the creation of this document. Although proof errors in transcription or interpretation are a potential of such software**

## 2025-05-14 NOTE — ASSESSMENT & PLAN NOTE
TSH/free T4 4/21/25 = 0.019/1.13  Nonemergent outpatient endocrine follow-up.   Will require outpatient TSH, free T4, +/- further imaging with endocrine such as radioactive uptake  Repeat TSH/free T4 and add on to 5/12's labs 5/12/25 = TSH was 0.287 with free T4 0.85

## 2025-05-15 PROBLEM — D64.9 ANEMIA: Status: ACTIVE | Noted: 2025-05-15

## 2025-05-15 LAB
ANION GAP SERPL CALCULATED.3IONS-SCNC: 9 MMOL/L (ref 4–13)
ANISOCYTOSIS BLD QL SMEAR: PRESENT
BASOPHILS # BLD MANUAL: 0.03 THOUSAND/UL (ref 0–0.1)
BASOPHILS NFR MAR MANUAL: 1 % (ref 0–1)
BUN SERPL-MCNC: 27 MG/DL (ref 5–25)
CALCIUM SERPL-MCNC: 8.5 MG/DL (ref 8.4–10.2)
CHLORIDE SERPL-SCNC: 98 MMOL/L (ref 96–108)
CO2 SERPL-SCNC: 29 MMOL/L (ref 21–32)
CREAT SERPL-MCNC: 1.25 MG/DL (ref 0.6–1.3)
EOSINOPHIL # BLD MANUAL: 0.1 THOUSAND/UL (ref 0–0.4)
EOSINOPHIL NFR BLD MANUAL: 3 % (ref 0–6)
ERYTHROCYTE [DISTWIDTH] IN BLOOD BY AUTOMATED COUNT: 12 % (ref 11.6–15.1)
GFR SERPL CREATININE-BSD FRML MDRD: 60 ML/MIN/1.73SQ M
GLUCOSE P FAST SERPL-MCNC: 155 MG/DL (ref 65–99)
GLUCOSE SERPL-MCNC: 114 MG/DL (ref 65–140)
GLUCOSE SERPL-MCNC: 153 MG/DL (ref 65–140)
GLUCOSE SERPL-MCNC: 155 MG/DL (ref 65–140)
GLUCOSE SERPL-MCNC: 161 MG/DL (ref 65–140)
GLUCOSE SERPL-MCNC: 212 MG/DL (ref 65–140)
HCT VFR BLD AUTO: 26.1 % (ref 36.5–49.3)
HGB BLD-MCNC: 9 G/DL (ref 12–17)
LYMPHOCYTES # BLD AUTO: 1.24 THOUSAND/UL (ref 0.6–4.47)
LYMPHOCYTES # BLD AUTO: 33 % (ref 14–44)
MCH RBC QN AUTO: 31.3 PG (ref 26.8–34.3)
MCHC RBC AUTO-ENTMCNC: 34.5 G/DL (ref 31.4–37.4)
MCV RBC AUTO: 91 FL (ref 82–98)
METAMYELOCYTE ABSOLUTE CT: 0.03 THOUSAND/UL (ref 0–0.1)
METAMYELOCYTES NFR BLD MANUAL: 1 % (ref 0–1)
MONOCYTES # BLD AUTO: 0 THOUSAND/UL (ref 0–1.22)
MONOCYTES NFR BLD: 0 % (ref 4–12)
NEUTROPHILS # BLD MANUAL: 1.94 THOUSAND/UL (ref 1.85–7.62)
NEUTS BAND NFR BLD MANUAL: 5 % (ref 0–8)
NEUTS SEG NFR BLD AUTO: 53 % (ref 43–75)
OVALOCYTES BLD QL SMEAR: PRESENT
PLATELET # BLD AUTO: 115 THOUSANDS/UL (ref 149–390)
PLATELET BLD QL SMEAR: ABNORMAL
PMV BLD AUTO: 10.1 FL (ref 8.9–12.7)
POIKILOCYTOSIS BLD QL SMEAR: PRESENT
POLYCHROMASIA BLD QL SMEAR: PRESENT
POTASSIUM SERPL-SCNC: 3.9 MMOL/L (ref 3.5–5.3)
RBC # BLD AUTO: 2.88 MILLION/UL (ref 3.88–5.62)
SODIUM SERPL-SCNC: 136 MMOL/L (ref 135–147)
VARIANT LYMPHS # BLD AUTO: 4 %
WBC # BLD AUTO: 3.35 THOUSAND/UL (ref 4.31–10.16)

## 2025-05-15 PROCEDURE — 85027 COMPLETE CBC AUTOMATED: CPT | Performed by: NURSE PRACTITIONER

## 2025-05-15 PROCEDURE — 99233 SBSQ HOSP IP/OBS HIGH 50: CPT | Performed by: STUDENT IN AN ORGANIZED HEALTH CARE EDUCATION/TRAINING PROGRAM

## 2025-05-15 PROCEDURE — 85007 BL SMEAR W/DIFF WBC COUNT: CPT | Performed by: NURSE PRACTITIONER

## 2025-05-15 PROCEDURE — 80048 BASIC METABOLIC PNL TOTAL CA: CPT | Performed by: NURSE PRACTITIONER

## 2025-05-15 PROCEDURE — 97110 THERAPEUTIC EXERCISES: CPT

## 2025-05-15 PROCEDURE — 97535 SELF CARE MNGMENT TRAINING: CPT

## 2025-05-15 PROCEDURE — 97130 THER IVNTJ EA ADDL 15 MIN: CPT

## 2025-05-15 PROCEDURE — 99232 SBSQ HOSP IP/OBS MODERATE 35: CPT | Performed by: NURSE PRACTITIONER

## 2025-05-15 PROCEDURE — 97530 THERAPEUTIC ACTIVITIES: CPT

## 2025-05-15 PROCEDURE — 82948 REAGENT STRIP/BLOOD GLUCOSE: CPT

## 2025-05-15 PROCEDURE — 97129 THER IVNTJ 1ST 15 MIN: CPT

## 2025-05-15 PROCEDURE — 97112 NEUROMUSCULAR REEDUCATION: CPT

## 2025-05-15 RX ADMIN — DOCUSATE SODIUM 100 MG: 100 CAPSULE, LIQUID FILLED ORAL at 08:03

## 2025-05-15 RX ADMIN — QUETIAPINE 50 MG: 25 TABLET ORAL at 21:28

## 2025-05-15 RX ADMIN — INSULIN LISPRO 2 UNITS: 100 INJECTION, SOLUTION INTRAVENOUS; SUBCUTANEOUS at 08:02

## 2025-05-15 RX ADMIN — HEPARIN SODIUM 5000 UNITS: 5000 INJECTION INTRAVENOUS; SUBCUTANEOUS at 14:40

## 2025-05-15 RX ADMIN — SENNOSIDES 17.2 MG: 8.6 TABLET, FILM COATED ORAL at 11:38

## 2025-05-15 RX ADMIN — HEPARIN SODIUM 5000 UNITS: 5000 INJECTION INTRAVENOUS; SUBCUTANEOUS at 21:29

## 2025-05-15 RX ADMIN — INSULIN LISPRO 4 UNITS: 100 INJECTION, SOLUTION INTRAVENOUS; SUBCUTANEOUS at 11:39

## 2025-05-15 RX ADMIN — ALLOPURINOL 300 MG: 300 TABLET ORAL at 08:03

## 2025-05-15 RX ADMIN — SITAGLIPTIN 50 MG: 50 TABLET, FILM COATED ORAL at 08:03

## 2025-05-15 RX ADMIN — METFORMIN ER 500 MG 1000 MG: 500 TABLET ORAL at 08:04

## 2025-05-15 RX ADMIN — Medication 6 MG: at 21:28

## 2025-05-15 RX ADMIN — CHLORHEXIDINE GLUCONATE 15 ML: 1.2 SOLUTION ORAL at 21:30

## 2025-05-15 RX ADMIN — PANTOPRAZOLE SODIUM 40 MG: 40 TABLET, DELAYED RELEASE ORAL at 06:04

## 2025-05-15 RX ADMIN — INSULIN LISPRO 6 UNITS: 100 INJECTION, SOLUTION INTRAVENOUS; SUBCUTANEOUS at 08:02

## 2025-05-15 RX ADMIN — DOCUSATE SODIUM 100 MG: 100 CAPSULE, LIQUID FILLED ORAL at 17:01

## 2025-05-15 RX ADMIN — ATORVASTATIN CALCIUM 20 MG: 20 TABLET, FILM COATED ORAL at 08:03

## 2025-05-15 RX ADMIN — INSULIN LISPRO 6 UNITS: 100 INJECTION, SOLUTION INTRAVENOUS; SUBCUTANEOUS at 11:39

## 2025-05-15 RX ADMIN — INSULIN LISPRO 6 UNITS: 100 INJECTION, SOLUTION INTRAVENOUS; SUBCUTANEOUS at 17:00

## 2025-05-15 RX ADMIN — INSULIN LISPRO 2 UNITS: 100 INJECTION, SOLUTION INTRAVENOUS; SUBCUTANEOUS at 16:59

## 2025-05-15 RX ADMIN — DEXAMETHASONE 1 MG: 2 TABLET ORAL at 21:29

## 2025-05-15 RX ADMIN — QUETIAPINE 12.5 MG: 25 TABLET ORAL at 11:38

## 2025-05-15 RX ADMIN — HEPARIN SODIUM 5000 UNITS: 5000 INJECTION INTRAVENOUS; SUBCUTANEOUS at 06:04

## 2025-05-15 RX ADMIN — DEXAMETHASONE 2 MG: 2 TABLET ORAL at 08:03

## 2025-05-15 RX ADMIN — CHLORHEXIDINE GLUCONATE 15 ML: 1.2 SOLUTION ORAL at 08:03

## 2025-05-15 NOTE — ASSESSMENT & PLAN NOTE
Monitor status post methotrexate  Peak creat was 2.77 on 4/22/25  Previous baseline as OP 5/2023-2/22/25 was 1.1 to 1.0  CMP 5/9/25 with creatinine of 1.18 down from 1.23 on 5/8 and on 5/10 was 1.15  CMP on 5/12/25 showed creatinine stable at 1.1  Today 5/15, creat is up to 1.25  Will need to watch and repeat BMP qd x 3 = if creat up more, consult Nephrology and may need to stop Metformin +/- adjust Januvia dose

## 2025-05-15 NOTE — PROGRESS NOTES
"   05/15/25 0830   Pain Assessment   Pain Assessment Tool 0-10   Pain Score No Pain   Restrictions/Precautions   Precautions Bed/chair alarms;Cognitive;Fall Risk;1:1;Supervision on toilet/commode   Weight Bearing Restrictions No   ROM Restrictions No   Comprehension   Comprehension (FIM) 3 - Understands basic info/conversation 50-74% of time   Expression   Expression (FIM) 3 - Expresses basic info/needs 50-74% of time   Social Interaction   Social Interaction (FIM) 5 - Interacts appropriately with others 90% of time   Problem Solving   Problem solving (FIM) 2 - Solves basic problems 25-49% of time   Memory   Memory (FIM) 1 - Recognizes, recalls/performs less than 25%   Speech/Language/Cognition Assessmetn   Treatment Assessment Pt seen for skilled speech therapy session targeting cognitive linguistic communication skills. Pt alert and interactive- completed the following skilled therapy tasks. Word deduction with ID objects given 3 clue words. Pt was able to complete with 6/12acc increasing with repetition, further context and verbal cues. Pt with occ perseveration of answers from one task to another. Pt next completed drawing conclusions task with predicating results from situations. Pt was able to determine \"what can happen if\" for situations with 8/12acc increasing with again further context and repetition. Pt requesting toileting- assisted to bathroom with RW. Tagged out of OT at this time. Pt overall is improving with skilled SLP services and will cont to benefit to maximize overall cognitive linguistic communication abilities at this time.   SLP Therapy Minutes   SLP Time In 0830   SLP Time Out 0900   SLP Total Time (minutes) 30   SLP Mode of treatment - Individual (minutes) 30   SLP Mode of treatment - Concurrent (minutes) 0   SLP Mode of treatment - Group (minutes) 0   SLP Mode of treatment - Co-treat (minutes) 0   SLP Mode of Treatment - Total time(minutes) 30 minutes   SLP Cumulative Minutes 360   Therapy " Time missed   Time missed? No

## 2025-05-15 NOTE — ASSESSMENT & PLAN NOTE
"Patient with development of worsening confusion, and was seen in the ED on 3/24/2025.  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  Lymphoma/leukemia panel was nondiagnostic  CTH 4/3/25: concerning for worsening hydrocephalus.   Repeat LP 4/7/25 = undiagnostic again   MRI brain 4/11/25: \"Interval enlargement of the dominant left anterior basal ganglionic mass lesion with greater surrounding vasogenic edema and mass effect. 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  S/p EVD, self removed 4/26  S/p Keppra 500mg BID x 7 days for postoperative seizure ppx   Started on chemotherapy during hospital stay and is for weekly Rituximab and Methotrexate every 2 weeks  Daily serum methotrexate levels until level is less than 0.1  Per heme-onc monitoring urine pH and Methotrexate levels do not need further monitoring at this time   Continue Dexamethasone taper = LD will be 5/19/25  Maintain PICC line  H-O following  Had Rituxan 5/10/25  Will be due for Methotrexate on 5/17 = d/w H-O today.  They saw his labs from today 5/15/25. They are ordering a  urine pH for tomorrow 5/16 and if it is less than 7 will need to start him on bicarb drip.  I did notify renal of this plan.  "

## 2025-05-15 NOTE — SPEECH THERAPY NOTE
Family meeting was held with pt's primary therapy team, Jr [facility] PT OT, Pearl GIORDANO MD- Dr. Kaminski and current SLP in addition to pt's family, spouse, Naldo and Elizabeth ordoñez via phone.  services was used for pt's spouse for more complex medical information via [facility]  services. Objective for meeting was to outline to pt's family current abilities given mobility as well as how cognitive skills impact the safety within pt's mobility. Of note, MD did discuss medical updates at length. PT/OT also providing updates to family in regard to his functioning to where SLP further discussing the overall severity given pt's cognitive linguistic skills. SLP did discuss the following deficits currently: decreased orientation, LTM biographical recall, STM recall, decreased executive functioning (problem solving, reasoning, insight, judgement), categorization, organization of thoughts, etc. Additionally discussed w/ pt's family that while pt does not present w/ an overt aphasia or apraxia of speech, pt does exhibit times where expression is impaired when attempting to elicit information. This is impacted in structured tasks as well as when asking about orientation as well as biographical information. Since pt still does have difficulty in stating own information, educated how this is a safety risk if pt is alone at home and there is an emergency situation where pt would not be able to communicate information effectively. Also fatigue impacts pt's overall cognitive skills as well as overall mobility where rest breaks are needed. SLP did educate pt's family recommendations for the need for 24/7 supervision/assistance at time of discharge. No increased questions were provided by family at this time. There is a plan for family training w/ spouse on Saturday 5/17/2025 where current SLP will complete and provide strategies and additional education to spouse about pt's cognitive skills.     Kate Aranda MA CCC-SLP

## 2025-05-15 NOTE — PLAN OF CARE
Problem: PAIN - ADULT  Goal: Verbalizes/displays adequate comfort level or baseline comfort level  Description: Interventions:- Encourage patient to monitor pain and request assistance- Assess pain using appropriate pain scale- Administer analgesics based on type and severity of pain and evaluate response- Implement non-pharmacological measures as appropriate and evaluate response- Consider cultural and social influences on pain and pain management- Notify physician/advanced practitioner if interventions unsuccessful or patient reports new pain  Outcome: Progressing     Problem: INFECTION - ADULT  Goal: Absence or prevention of progression during hospitalization  Description: INTERVENTIONS:- Assess and monitor for signs and symptoms of infection- Monitor lab/diagnostic results- Monitor all insertion sites, i.e. indwelling lines, tubes, and drains- Monitor endotracheal if appropriate and nasal secretions for changes in amount and color- [place] appropriate cooling/warming therapies per order- Administer medications as ordered- Instruct and encourage patient and family to use good hand hygiene technique- Identify and instruct in appropriate isolation precautions for identified infection/condition  Outcome: Progressing     Problem: SAFETY ADULT  Goal: Patient will remain free of falls  Description: INTERVENTIONS:- Educate patient/family on patient safety including physical limitations- Instruct patient to call for assistance with activity - Consult OT/PT to assist with strengthening/mobility - Keep Call bell within reach- Keep bed low and locked with side rails adjusted as appropriate- Keep care items and personal belongings within reach- Initiate and maintain comfort rounds- Make Fall Risk Sign visible to staff- Offer Toileting every 2 Hours, in advance of need- Initiate/Maintain bed/chair alarm- Obtain necessary fall risk management equipment: alarms; cont. 1:1 obser.- Apply yellow socks and bracelet for high  fall risk patients- Consider moving patient to room near nurses station  Outcome: Progressing  Goal: Maintain or return to baseline ADL function  Description: INTERVENTIONS:-  Assess patient's ability to carry out ADLs; assess patient's baseline for ADL function and identify physical deficits which impact ability to perform ADLs (bathing, care of mouth/teeth, toileting, grooming, dressing, etc.)- Assess/evaluate cause of self-care deficits - Assess range of motion- Assess patient's mobility; develop plan if impaired- Assess patient's need for assistive devices and provide as appropriate- Encourage maximum independence but intervene and supervise when necessary- Involve family in performance of ADLs- Assess for home care needs following discharge - Consider OT consult to assist with ADL evaluation and planning for discharge- Provide patient education as appropriate  Outcome: Progressing  Goal: Maintains/Returns to pre admission functional level  Description: INTERVENTIONS:- Perform AM-PAC 6 Click Basic Mobility/ Daily Activity assessment daily.- Set and communicate daily mobility goal to care team and patient/family/caregiver. - Collaborate with rehabilitation services on mobility goals if consulted- Perform Range of Motion 3 times a day.- Reposition patient every 2 hours.- Dangle patient 3 times a day- Stand patient 3 times a day- Ambulate patient 3 times a day- Out of bed to chair 3 times a day - Out of bed for meals 3 times a day- Out of bed for toileting- Record patient progress and toleration of activity level   Outcome: Progressing     Problem: DISCHARGE PLANNING  Goal: Discharge to home or other facility with appropriate resources  Description: INTERVENTIONS:- Identify barriers to discharge w/patient and caregiver- Arrange for needed discharge resources and transportation as appropriate- Identify discharge learning needs (meds, wound care, etc.)- Arrange for interpretive services to assist at discharge as  needed- Refer to Case Management Department for coordinating discharge planning if the patient needs post-hospital services based on physician/advanced practitioner order or complex needs related to functional status, cognitive ability, or social support system  Outcome: Progressing     Problem: Prexisting or High Potential for Compromised Skin Integrity  Goal: Skin integrity is maintained or improved  Description: INTERVENTIONS:- Identify patients at risk for skin breakdown- Assess and monitor skin integrity- Assess and monitor nutrition and hydration status- Monitor labs - Assess for incontinence - Turn and reposition patient- Assist with mobility/ambulation- Relieve pressure over bony prominences- Avoid friction and shearing- Provide appropriate hygiene as needed including keeping skin clean and dry- Evaluate need for skin moisturizer/barrier cream- Collaborate with interdisciplinary team - Patient/family teaching- Consider wound care consult   Outcome: Progressing     Problem: Nutrition/Hydration-ADULT  Goal: Nutrient/Hydration intake appropriate for improving, restoring or maintaining nutritional needs  Description: Monitor and assess patient's nutrition/hydration status for malnutrition. Collaborate with interdisciplinary team and initiate plan and interventions as ordered.  Monitor patient's weight and dietary intake as ordered or per policy. Utilize nutrition screening tool and intervene as necessary. Determine patient's food preferences and provide high-protein, high-caloric foods as appropriate. INTERVENTIONS:- Monitor oral intake, urinary output, labs, and treatment plans- Assess nutrition and hydration status and recommend course of action- Evaluate amount of meals eaten- Assist patient with eating if necessary - Allow adequate time for meals- Recommend/ encourage appropriate diets, oral nutritional supplements, and vitamin/mineral supplements- Order, calculate, and assess calorie counts as needed-  Recommend, monitor, and adjust tube feedings and TPN/PPN based on assessed needs- Assess need for intravenous fluids- Provide specific nutrition/hydration education as appropriate- Include patient/family/caregiver in decisions related to nutrition  Outcome: Progressing

## 2025-05-15 NOTE — TEAM CONFERENCE
Acute RehabilitationTeam Conference Note  Date: 5/15/2025   Time: 10:36 AM       Patient Name:  Alexis Dennison       Medical Record Number: [id number]   YOB: 1960  Sex: Male          Room/Bed:  [facility]4/[facility] 964-01  Payor Info:  Payor: SAM CROSS / Plan: MISC BLUE CROSS / Product Type: Blue Fee for Service /      Admitting Diagnosis: Lymphoma (HCC) [C85.90]   Admit Date/Time:  5/7/2025  6:13 PM  Admission Comments: No comment available     Primary Diagnosis:  Primary central nervous system (CNS) lymphoma  Principal Problem: Primary central nervous system (CNS) lymphoma    Patient Active Problem List    Diagnosis Date Noted    Thrombocytopenia (HCC) 05/12/2025    Hyponatremia 05/10/2025    Impaired mobility and activities of daily living 05/08/2025    HTN (hypertension) 05/08/2025    Transaminitis 05/08/2025    Leukopenia 05/06/2025    At risk for acid-base imbalance 05/06/2025    Electrolyte imbalance risk 05/06/2025    E. coli UTI 05/01/2025    Sepsis (HCC) 04/29/2025    E coli bacteremia 04/29/2025    Metabolic alkalosis 04/24/2025    LETY (acute kidney injury) ([facility]) 04/21/2025    Goals of care, counseling/discussion 04/16/2025    Palliative care encounter 04/16/2025    Primary CNS lymphoma 04/16/2025    Encephalopathy 04/02/2025    Ambulatory dysfunction 04/01/2025    Thyroid nodule 03/30/2025    Pulmonary nodule 03/30/2025    Liver lesion 03/30/2025    Primary central nervous system (CNS) lymphoma 03/29/2025    Type 2 diabetes mellitus with hyperglycemia, without long-term current use of insulin (HCC) 06/14/2022    HTN, goal below 130/80 06/14/2022    Mixed hyperlipidemia 06/14/2022    Vitamin D deficiency 06/14/2022    NAFLD (nonalcoholic fatty liver disease) 06/14/2022       Physical Therapy:    Weight Bearing Status: Full Weight Bearing  Transfers: Incidental Touching  Bed Mobility: Supervision  Amulation Distance (ft): 150 feet  Ambulation: Minimal Assistance, Incidental Touching  Assistive  Device for Ambulation: Other (none)  Number of Stairs: 12  Assistive Device for Stairs: Right Hand Rail  Stair Assistance: Incidental Touching  Ramp: Incidental Touching  Assistive Device for Ramp: None  Discharge Recommendations: Home with:  DC Home with:: 24 Hour Assisteance, Family Support, Home Physical Therapy    Alexis is a 65 y/o male with PMH including DM, HLD, HTN, GERD, liver disease and sleep apnea, who presented to hospital on 3/29 for increased confusion. 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         5/12:Pt cont to be limited by poor safety awareness, decreased cognition, decreased processing, decreased caregiver support and poor righting reactions. Pt remains a high fall risk 2/2 to this limitations. Pt was able to maintain CS/CGA with gait, CGA on a FF with single HR, S with STS transfers and S with bed mobility. Pt requires constant VC's for direction and task management 2/2 poor cognition and decreased carryover. Pt is recommended 24 S at home 2/2 decreased cognition and current changes in pt's abilities. Pt will cont POC as tolerated with cont focus on gait, balance, coordination, duel tasking, righting reactions and stair management to decrease burden of care and decrease fall risk. Currently planning on family/team meeting 5/14 at 10am to review d/c planning with family.     5/15: Alexis cont to fluctuate between CS-Maddy level based upon variations in fatigue and cognition. He cont to have deficits listed above with sequencing, problem solving, executive function, language, balance and righting reactions, therefore cont to recommend CS-Maddy at home. Family meeting was held today with pt's wife and his dtr and current plan is to do FT with wife this  Saturday afternoon and she will stay over to see what  care he needs over night. RN is aware of this plan to provide edu to her over night as needed. Based upon FT and how much more is needed, planning on dc home sometime next week. Recommending he is not home alone due to cognitive deficits. Family was asking about DME; will trial with rollator to see how he does with this device to provide appropriate recommendations for home.     Occupational Therapy:  Eating:  (setup)  Grooming: Incidental Touching  Bathing: Minimal Assistance  Bathing: Minimal Assistance  Upper Body Dressing: Supervision  Lower Body Dressing: Minimal Assistance, Moderate Assistance  Toileting: Minimal Assistance  Toilet Transfer: Incidental Touching, Minimal Assistance  Cognition: Exceptions to WNL  Cognition: Decreased Memory, Behavioral Considerations, Impulsive, Decreased Executive Functions, Decreased Attention, Decreased Comprehension, Decreased Safety  Orientation: Person  Discharge Recommendations: Home with:  DC Home with:: Family Support, 24 Hour Assistance, 24 Hour Supervision, First Floor Setup       05/12/25  Pt is demonstrating fair progress with occupational therapy and is progressing toward long term goals for ADL, IADL, and functional transfers/mobility. Pts long term goals for ADLs are sup w/ no assistive device. Pt is currently Partial/moderate assistance  for ADLs. Pt continues to present with impairments in activity tolerance, endurance, standing balance/tolerance, UE strength, memory, insight, safety , judgement , attention , sequencing , task initiation , and task termination . Occupational performance remains limited by fatigue, impulsivity, decreased caregiver support, risk for falls, and home environment. Family training/education will be required prior to D/C. Pt will continue to benefit from skilled acute rehab OT services to address above mentioned barriers and maximize functional independence in baseline areas of  occupation to meet established treatment goals with overall decreased burden of care. Plan of care to continue to focus on ADL Retraining , LB Dressing, UB dressing, Functional Transfers, Functional Cognition, Functional Attention, Standing tolerance, Standing balance , Gross motor strengthening , Family training/education, Energy conservation training/education, healthy coping education, Leisure and social pursuits, and community re-integration. Goals for the upcoming week are: setup FT w/ family and explain reasoning for 24/7 SUP level rec for home    Anticipate Discharge date to be set.       Speech Therapy:  Mode of Communication: Verbal  Speech/Language:  (word finding difficulty)  Cognition: Exceptions to WNL  Cognition: Decreased Memory, Decreased Executive Functions, Decreased Attention, Decreased Comprehension, Decreased Safety, Impulsive  Orientation: Person  Discharge Recommendations: Home with:  DC Home with:: 24 Hour Supervision, 24 Hour Assisteance, Family Support, Home Speech Therapy, Outpatient Speech Therapy (pending pt progress)  Pt currently being followed for cognitive linguistic tx sessions. Upon admission to the unit, pt was demonstrating fairly significant difficulty in providing own LT biographical information to where the Informal Cognitive Assessment was initiated only. Pt is demonstrating severely impaired deficits noted in the following areas: decreased attention, LT memory recall, ST memory recall , problem solving, reasoning, sequencing, organization of thoughts, judgement, slower processing, insight, and as well as likely deficits in language skills. Pt's overall attention towards tasks did fluctuate throughout assessment, but able to be redirected to tasks at hand. Throughout orientation and LT memory biographical review, pt exhibiting more deficits given cognitive skills, but when initiating more structured tasks, pt demonstrating more word finding deficits, as well as perseverations  on words provided. Question true language skills vs cognitive deficits, most likely combination of both, currently impacting pt's overall cognitive functioning. In order to address the noted barriers, skilled SLP services will address this by targeting the following interventions: visual orientation checklist, memory book/memory packet, verbal problem solving task, visual memory recall tasks, drawing conclusions activities, written sequencing tasks, categorization tasks, picture problem solving activities, functional reading tasks, word deduction tasks and family education/training.     Current level of functioning:   Comprehension: mod A  Expression: mod A  Social Interaction: supervision-min A  Executive functions: total A  Memory: total A    Family training/education has been initiated with pt's spouse, Naldo very briefly but also children were present to provide initial education given role of services as well as skills being targeted. There will be a plan for family meeting later to d/w family about the recommendations for the need given 24/7 supervision/assistance at times of discharge due to the significant deficits of cognitive skills at this time. Recommendations at time of discharge are for continued skilled ST services pending disposition home vs OP.    This week, focus for continued services to target reorientation skills to current events, review of LTM biographical information, memory recall strategies, basic categorization tasks, basic problem solving skills. At this time, pt will continue to benefit from skilled cognitive linguistic tx session to maximize overall functional independence given overall cognitive linguistic skills in attempts to decreased caregiver burden over time.     Update from week: 5/15/2025. Pt is being followed for cognitive linguistic tx sessions to where pt is making slower and steady progress this week.     Continued cognitive barriers which present include: decreased  attention, visual attention, LT memory recall, ST memory recall , problem solving, reasoning, sequencing, organization of thoughts, judgement, slower processing, insight, and as well as word finding deficits, which still impacts pt's overall safety, functional cognitive communication skills as well as functional mobility. The following interventions are used to target these barriers, including visual orientation checklist, verbal problem solving task, verbal working memory tasks, visual memory recall tasks, drawing conclusions activities, written sequencing tasks, categorization tasks , picture problem solving activities, verbal reasoning tasks, functional reading tasks, word deduction tasks and family education/training.     Current level of functioning:   Comprehension: min-mod A  Expression: min-mod A  Social Interaction: supervision  Executive functions: max-total A  Memory: total A    Family training/education has been initiated and will be ongoing with pt's spouse, Naldo. Spouse was recently in for ST session in which she was able to observe pt's ability to complete structured basic reading comprehension tasks where pt does better vs verbal explanation of information. Will continue to review strategies w/ spouse to continue to maximize pt's cognitive skills as well as overall safety within the home. Recommendations at time of discharge are for continued skilled ST services but TBD if home vs OP services.    This week, focus for continued services to target ongoing review of orientation, LT biographical information, basic problem solving, basic reasoning, functional sequencing tasks, etc. At this time, pt will continue to benefit from skilled cognitive linguistic tx session to maximize overall functional independence given overall cognitive linguistic skills in attempts to decreased caregiver burden over time.       Nursing Notes:  Appetite: Good  Diet Type: Diabetic                      Diet Patient/Family  Education Complete: No                         Type of Wound Patient/Family Education: No  Bladder: Continent     Bladder Patient/Family Education: No  Bowel: Continent     Bowel Patient/Family Education: No  Pain Location/Orientation:  (chest/he reports indigestion feeling)  Pain Score: 0                          Pain Patient/Family Education: No  Medication Management/Safety  Medication Patient/Family Education Complete: No    64 y.o. male with a PMH of hypertension, hyperlipidemia, diabetes who presented to the [facility] primary history of persistent lymphoma.  Patient was just hospitalized for encephalopathy due to primary CNS lymphoma.  Patient on a dexamethasone taper.  Patient received methotrexate during his hospitalization and will need continuing monitoring.  Patient now admitted to inpatient acute rehab.     5/8/25-This week we will continue to monitor patient's labs and vital signs and monitor pain. We will medicate patient for pain as ordered. We will encourage independence with ADL's. We will educate fall prevention and safety precautions. Pt is a 1:1 for cognitive impairment, poor safety and impulsive behavior. We will continue medication education. Monitor and maintain adequate po intake. Turn and reposition Q 2 hour, assess and monitor for skin breakdown.    5/13  LETY is resolved and renal function is stable.Renal signed off.On 5/10/25, increased Lispro WM to 4U TID .BSs should continue to improve with steroid being tapered  Will increase Lispro WM to 6U TID today 5/12/25, Continue DM diet.  This week we will continue to monitor patient's labs and vital signs and monitor pain. We will medicate patient for pain as ordered. We will encourage independence with ADL's. We will educate fall prevention and safety precautions. Pt is a 1:1 for cognitive impairment, poor safety and impulsive behavior. We will continue medication education. Monitor and maintain adequate po intake.  Turn and reposition Q 2 hour, assess and monitor for skin breakdown.      Case Management:     Discharge Planning  Living Arrangements: Lives w/ Spouse/significant other  Support Systems: Spouse/significant other, Daughter  Assistance Needed: will need 24/7 Supervision  Type of Current Residence: Private residence  Current Home Care Services: No  Family meeting held to review pts abilities and plan for dc. Family is aware of dc for later next week and family training has been scheduled with pts wife. She will be onsite this Saturday and stay over to see how pts behaviors vary throughout the day. Following for clinical update due Friday and for dc recommendations.     Is the patient actively participating in therapies? yes  List any modifications to the treatment plan: No    Barriers Interventions   Fluctuating blood sugars Adjusting medications    Impaired cognition (LTM, STM, executive function) 1:1, family training, med adjustments; high repetition training; visual reorientation; written cues/ memory book; focus on reading comprehension; written categorization and problem solving activities   Platelets dropping  Continuing to monitor, may seek input from hematology if continues   Incontinent Will implement timed void for bladder   Decreased balance and righting reactions Balance and endurance training; assessing least restrictive/ most functional assistive device   Limited endurance Endurance training, increasing functional task completion, family training with wife             Is the patient making expected progress toward goals? yes  List any update or changes to goals: No    Medical Goals: Patient will be medically stable for discharge to [facility] upon completion of rehab program and Patient will be able to manage medical conditions and comorbid conditions with medications and follow up upon completion of rehab program    Weekly Team Goals:   Rehab Team Goals  ADL Team Goal: Patient will  require supervision with ADLs with least restrictive device upon completion of rehab program  Bowel/Bladder Team Goal: Patient will require supervision with bladder/bowel management with least restrictive device upon completion of rehab program  Transfer Team Goal: Patient will require supervision with transfers with least restrictive device upon completion of rehab program  Locomotion Team Goal: Patient will require supervision with locomotion with least restrictive device upon completion of rehab program  Cognitive Team Goal: Patient will require supervision for basic and complex tasks upon completion of rehab program    Discussion: Patient is participating in all therapies. Currently he's functioning at a close S-min A level with mobility, S-min A for self care and varies between moderate A and total A for cognition. The team anticipates that he will be able to progress to a close S level for all mobility and self care tasks consistently and min-mod A for cognitive function to minimize burden of care and allow for safe return home with family. Family training will continue over the next week and will be staying overnight for training session. The team anticipates he will meet his DC goals over the next week and is recommending that he continue with home PT, OT, SLP, and SW.     Anticipated Discharge Date:  5/22/2025  [facility] Team Members Present:  The following team members are supervising care for this patient and were present during this Weekly Team Conference.    Physician: Dr. Yamilex DO  : Satnam Rodriguez DPT  Registered Nurse: Heather Hines RN  Physical Therapist: Kate Gomez DPT  Occupational Therapist: Jr Montoya, MS, OTR/L  Speech Therapist: Kate Armenta MS, CCC-SLP

## 2025-05-15 NOTE — ASSESSMENT & PLAN NOTE
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

## 2025-05-15 NOTE — PROGRESS NOTES
Progress Note - PMR   Name: Alexis Dennison 64 y.o. 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  > Started on high dose steroids and methotrexate every 2 weeks for 4 weeks (C1 received 4/18, C2 received 5/2) then maintenance every 4 weeks with Rituximab weekly for 8 weeks (1st dose Thursday 4/24 and second 5/3)  > Admitted to [facility] with ongoing cognitive deficits and delirium    - Continue Decadron 2 mg Q12 on taper  - Continue MTX + Rituximab as per Hem/onc  - Daily methotrexate levels  - Hem/Onc following  - NSGY following  - SLP for cognition  Type 2 diabetes mellitus with hyperglycemia, without long-term current use of insulin (HCC)  Lab Results   Component Value Date    HGBA1C 6.6 (H) 02/22/2025       Recent Labs     05/14/25  1043 05/14/25  1540 05/14/25  2058 05/15/25  0610   POCGLU 243* 118 161* 153*       Blood Sugar Average: Last 72 hrs:  (P) [id number]    - Metformin 1000mg daily  - Lantus 10u daily with Lispro 8u TID and SSI  - Monitor accuchecks while on steroids  - Management per IM  HTN, goal below 130/80  > Blood pressure controlled off antihypertensives    - Monitor VS  - Management per IM  Mixed hyperlipidemia  - Continue atorvastatin 10 mg daily  Thyroid nodule    Pulmonary nodule  > CTA 3/29: nonspecific 4 mm RLL pulmonary nodule   - follow up outpatient for repeat imaging in 3 months  Liver lesion  >  CTA 3/29- nonspecific 12mm hypodense right hepatic lesion, recommended MRI abdomen  > MRI abdomen 3/30- simple right hepatic lobe cyst    - Follow up outpatient for monitoring  Ambulatory dysfunction  - Fall precautions  - PT/OT  Encephalopathy  > Patient has been pleasantly confused for months in the setting of brain mass.  > acutely worsened due to UTI and bacteremia. S/p 7 day course of antibx  > Continues to lack insight to his current situation and is severely cognitively impaired.  He does not get agitated for long periods of time and is mostly confused and impulsive.  > A&O 1 on [facility] admission    - Continue seroquel 12.5mg at noon with 50mg HS  - PRN Zyprexa 2.5mg IM as needed for agitation  -Overstimulation precautions, frequent re-orientation, re-direction, re-assurance  -Sleep log and agitation monitoring if needed  -Optimize sleep-wake cycle  -Limit sedating medications when possible  - Ensure optimal management electrolytes, nutrition, and hydration  - Ensure optimal bowel/bladder management  - Ensure optimal pain management   -Patient/family/caregiver education and training   -Continue 1:1 due to overall safety, impulsivity, lack of safety awareness and poor insight.  -Fall precautions with frequent rounding; proactive toileting program, patient should not be unattended in bathroom      LETY (acute kidney injury) (HCC)  > Baseline Cr 0.9  > Recently 1.3 improved from before- peak 2.7  > Now s/p bicarb    - IVF per IM/Nephro  - Management at discretion of nephro   E. coli UTI  > Pet met sepsis criteria on 4/29 with confusion and agitation. UA was positive. Urine culture and blood cultures showed Ecoli sensitive to cephalosporins.  > ID consulted and pt completed 7d course of ceftriaxone  > Approved to restart chemo 5/2.    - Monitor for any recurrence of agitation or new fevers/signs of infection  Impaired mobility and activities of daily living  Patient was evaluated by the rehabilitation team MD and an  appropriate candidate for acute inpatient rehabilitation program at this time.  The patient will tolerate 3 hours/day 5 to 7 days/week of intensive physical, occupational in order to obtain goals for community discharge  Due to the patient's functional Compared to their baseline level of function in addition to their ongoing medical needs, the patient would benefit from daily supervision from a rehabilitation physician as well as rehabilitation nursing to implement and adjust the medical as well as functional plan of care in order to meet the patient's goals.  HTN (hypertension)  Had been on losartan but holding at this time was her blood pressures are good  Transaminitis  CMP checked on 5/9 and AST is down to 57 from 111 and ALT to 281 from 322  ALT and AST were elevated  Hyponatremia  Sodium improved from 132 up to 136 today on 5/12  Thrombocytopenia (HCC)  Platelets down to 100 17K will need to continue monitoring while on chemotherapy  Sepsis (HCC)    E coli bacteremia    Leukopenia    At risk for acid-base imbalance    Electrolyte imbalance risk      Subjective   Patient is a 64-year-old male with history of type 2 diabetes, GERD, hyperlipidemia and hypertension on nonalcoholic fatty liver disease and sleep apnea who presents to [facility] on 3/29/2025 for increasing confusion. He had been recently diagnosed with a brain mass and family reported that he had been having increased symptoms including forgetfulness over the past several months. Initial workup was negative however he was seen in the ER on 3/24 and a CT of the head was concerning for brain lesion. 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 Additional areas of enhancement in the basal ganglia on the left as well as the left cerebral peduncle/left quirino. There was an initial plan to have an outpatient neurosurgery evaluation but because of his worsening  symptoms including visual deficits he would came to the ER sooner. A CT of the chest abdomen pelvis was completed on 3/29 showing a right lower lobe pulmonary nodule and nonspecific hepatic lesion and MRI was completed revealing a simple right hepatic lobe cyst. A diagnostic lumbar puncture was completed on 3/31 suspicious for CNS lymphoma and a repeat CT completed on 4/3 showed worsening hydrocephalus and MRI on 4/11 showed disease progression. Biopsy was done and EVD placed on 4/14 and the patient self extubated postprocedure. The preliminary results from the biopsy were indicative of a small round blue cell tumor suggestive of non-Hodgkin's lymphoma. He was started on high-dose steroids, methotrexate and rituximab by oncology but did have a complicated course including LETY, intermittent agitated delirium requiring medications as well as continuous observation as well as an E. coli UTI with bacteremia status post course of antibiotics. The EVD was removed on 4/26.     Chief Complaint: f/u ambulatory dysfunction    Interval: This patient was discussed by the interdisciplinary team in weekly case conference today. The care of the patient was extensively discussed with all care providers and an appropriate rehabilitation plan was formulated unique for this patient. Barriers were identified preventing progression of therapy and appropriate interventions were discussed with each discipline. Please see the team note for input from all disciplines regarding barriers, intervention, and discharge planning.  Plan for discharge likely on Thursday after family training to occur this weekend and likely 1 additional day at the beginning of next week. Plan for DC on 5/22.     Labs obtained today including a CBC as well as a BMP.  On the CBC the hemoglobin is relatively stable as well as the platelets which have decreased from 117K down to 115K.  WBC count is slightly lower.  On the BMP the BUN has decreased. Continue with timed  voids.    Objective :  Temp:  [97.8 °F (36.6 °C)-98 °F (36.7 °C)] 98 °F (36.7 °C)  HR:  [69-88] 69  BP: (104-128)/(75-76) 104/76  Resp:  [16-18] 18  SpO2:  [97 %-98 %] 98 %  O2 Device: None (Room air)    Functional Update:  Physical Therapy Occupational Therapy Speech Therapy   Weight Bearing Status: Full Weight Bearing  Transfers: Incidental Touching  Bed Mobility: Supervision  Amulation Distance (ft): 150 feet  Ambulation: Minimal Assistance, Incidental Touching  Assistive Device for Ambulation: Other (none)  Number of Stairs: 12  Assistive Device for Stairs: Right Hand Rail  Stair Assistance: Incidental Touching  Ramp: Incidental Touching  Assistive Device for Ramp: None  Discharge Recommendations: Home with:  DC Home with:: 24 Hour Assisteance, Family Support, Home Physical Therapy   Eating:  (setup)  Grooming: Incidental Touching  Bathing: Minimal Assistance  Bathing: Minimal Assistance  Upper Body Dressing: Supervision  Lower Body Dressing: Minimal Assistance, Moderate Assistance  Toileting: Minimal Assistance  Toilet Transfer: Incidental Touching, Minimal Assistance  Cognition: Exceptions to WNL  Cognition: Decreased Memory, Behavioral Considerations, Impulsive, Decreased Executive Functions, Decreased Attention, Decreased Comprehension, Decreased Safety  Orientation: Person   Mode of Communication: Verbal  Speech/Language:  (word finding difficulty)  Cognition: Exceptions to WNL  Cognition: Decreased Memory, Decreased Executive Functions, Decreased Attention, Decreased Comprehension, Decreased Safety, Impulsive  Orientation: Person  Discharge Recommendations: Home with:  DC Home with:: 24 Hour Supervision, 24 Hour Assisteance, Family Support, Home Speech Therapy, Outpatient Speech Therapy (pending pt progress)     Physical Exam  Vitals reviewed.   Constitutional:       Appearance: Normal appearance. He is normal weight.   HENT:      Head: Normocephalic.      Comments: Anterior scalp biopsy site healing  well     Right Ear: External ear normal.      Left Ear: External ear normal.      Nose: Nose normal. No rhinorrhea.      Mouth/Throat:      Mouth: Mucous membranes are moist.      Pharynx: Oropharynx is clear.     Eyes:      General: No scleral icterus.      Cardiovascular:      Rate and Rhythm: Normal rate.   Pulmonary:      Effort: Pulmonary effort is normal. No respiratory distress.      Breath sounds: Normal breath sounds.   Abdominal:      General: There is no distension.      Palpations: Abdomen is soft.     Musculoskeletal:      Right lower leg: No edema.      Left lower leg: No edema.     Skin:     General: Skin is warm and dry.     Neurological:      Mental Status: He is alert.      Motor: No weakness.      Gait: Gait abnormal.      Comments: Oriented to self only with limited insight and safety awareness   Psychiatric:         Mood and Affect: Mood normal.         Behavior: Behavior normal.           Scheduled Meds:  Current Facility-Administered Medications   Medication Dose Route Frequency Provider Last Rate    acetaminophen  650 mg Oral Q6H PRN Crispin Arana MD      allopurinol  300 mg Oral Daily Crispin Arana MD      alteplase  2 mg Intracatheter Q1MIN PRN Crispin Arana MD      alteplase  2 mg Intracatheter Q1MIN PRN Magali Lee MD      aluminum-magnesium hydroxide-simethicone  30 mL Oral Q4H PRN Crispin Arana MD      atorvastatin  20 mg Oral Daily Crispin Arana MD      bisacodyl  10 mg Rectal Daily PRN Jessica Arreola DO      calcium carbonate  1,000 mg Oral Daily PRN Crispin Arana MD      chlorhexidine  15 mL Mouth/Throat Q12H CAIT Arana MD      dexamethasone  1 mg Oral Q12H [facility] Crispin Arana MD      docusate sodium  100 mg Oral BID Jessica Arreola DO      heparin (porcine)  5,000 Units Subcutaneous Q8H CAIT Arana MD      insulin lispro  2-12 Units Subcutaneous 4x Daily (AC & HS) Crispin Arana MD      insulin lispro  6 Units Subcutaneous TID With Meals Roseanne Matthews  PATI Castaneda      lactulose  20 g Oral Daily PRN Joshua Kaminski, DO      melatonin  6 mg Oral HS Crispin Arana MD      metFORMIN  1,000 mg Oral Daily Crispin Arana MD      OLANZapine  5 mg Intramuscular BID PRN Crispin Arana MD      ondansetron  4 mg Intravenous Q6H PRN Crispin Arana MD      oxyCODONE  2.5 mg Oral Q4H PRN Crispin Arana MD      Or    oxyCODONE  5 mg Oral Q4H PRN Crispin Arana MD      pantoprazole  40 mg Oral Early Morning Crispin Arana MD      polyethylene glycol  17 g Oral Daily PRN Jessica Arreola, DO      QUEtiapine  12.5 mg Oral Daily Crispin Arana MD      QUEtiapine  50 mg Oral HS Crispin Arana MD      senna  2 tablet Oral Daily With Lunch Jessicakatherin Arreola, DO      sitaGLIPtin  50 mg Oral Daily PATI Porras      sodium chloride  20 mL/hr Intravenous Once PRN Crispin Arana MD      sodium chloride  20 mL/hr Intravenous Once PRN Magali Lee MD           Lab Results: I have reviewed the following results:  Results from last 7 days   Lab Units 05/15/25  0600 05/12/25  0614 05/09/25  0512   HEMOGLOBIN g/dL 9.0* 9.3* 8.8*   HEMATOCRIT % 26.1* 26.4* 24.7*   WBC Thousand/uL 3.35* 5.16 3.36*   PLATELETS Thousands/uL 115* 117* 160     Results from last 7 days   Lab Units 05/15/25  0600 05/12/25  0614 05/10/25  1023 05/09/25  0512   BUN mg/dL 27* 33* 34* 35*   SODIUM mmol/L 136 136 132* 138   POTASSIUM mmol/L 3.9 4.3 4.4 4.5   CHLORIDE mmol/L 98 101 98 101   CREATININE mg/dL 1.25 1.11 1.15 1.18   AST U/L  --  14 19 57*   ALT U/L  --  103* 187* 281*                Joshua Kaminski DO  Physical Medicine and Rehabilitation  [facility]    I have spent a total time of 50 minutes in caring for this patient on the day of the visit/encounter including Counseling / Coordination of care, Documenting in the medical record, Reviewing/placing orders in the medical record (including tests, medications, and/or procedures), and Communicating with other healthcare  professionals .  Additional time spent during weekly conference with nursing, case management and therapies

## 2025-05-15 NOTE — PROGRESS NOTES
"Progress Note - Hospitalist   Name: Alexis Dennison 64 y.o. male I MRN: [id number]  Unit/Bed#: -01 I Date of Admission: 5/7/2025   Date of Service: 5/15/2025 I Hospital Day: 8    Assessment & Plan  Primary central nervous system (CNS) lymphoma  Patient with development of worsening confusion, and was seen in the ED on 3/24/2025.  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  Lymphoma/leukemia panel was nondiagnostic  CTH 4/3/25: concerning for worsening hydrocephalus.   Repeat LP 4/7/25 = undiagnostic again   MRI brain 4/11/25: \"Interval enlargement of the dominant left anterior basal ganglionic mass lesion with greater surrounding vasogenic edema and mass effect. 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  S/p EVD, self removed 4/26  S/p Keppra 500mg BID x 7 days for postoperative seizure ppx   Started on chemotherapy during hospital stay and is for weekly Rituximab and Methotrexate every 2 weeks  Daily serum methotrexate levels until level is less than 0.1  Per heme-onc monitoring urine pH and Methotrexate levels do not need further monitoring at this time   Continue Dexamethasone taper = LD will be 5/19/25  Maintain PICC line  H-O following  Had Rituxan 5/10/25  Will be due for Methotrexate on 5/17 = d/w H-O today.  They saw his labs from today 5/15/25. They are ordering a  urine pH for tomorrow 5/16 and if it is less than 7 will need to start him on bicarb drip.  " I did notify renal of this plan.  Type 2 diabetes mellitus with hyperglycemia, without long-term current use of insulin (HCC)  Hemoglobin A1C was 6.6 on 2/22/25  Sees Endo [facility] in [place]  Home:  Janumet XR  qd  Here: Lispro 6 U TID/Metformin XR 1000 mg qd/Januvia 50mg qd  On 5/12/25, increased Lispro WM to 6U TID  Restarted Januvia 50 mg qd on 5/14/25 and stopped the Lantus 5/13/25.  Kept Lispro WM same.  Continue DM diet  No changes today 5/15/25  BSs should continue to improve with steroid being tapered (LD will be 5/19)  To have BMP qd x 3 and Metformin may need to be held if creatinine is rising +/- adjust Januvia  Mixed hyperlipidemia  Continue atorvastatin  Thyroid nodule  TSH/free T4 4/21/25 = 0.019/1.13  Nonemergent outpatient endocrine follow-up.   Will require outpatient TSH, free T4, +/- further imaging with endocrine such as radioactive uptake  Repeat TSH/free T4 added on to 5/12's labs = TSH was 0.287 with free T4 0.85  Pulmonary nodule  CT chest 3 months follow-up  Liver lesion  Patient will require outpatient follow-up  Encephalopathy  Improving  Continue Seroquel  Continue steroid taper  On 1:1  LETY (acute kidney injury) (HCC)  Monitor status post methotrexate  Peak creat was 2.77 on 4/22/25  Previous baseline as OP 5/2023-2/22/25 was 1.1 to 1.0  CMP 5/9/25 with creatinine of 1.18 down from 1.23 on 5/8 and on 5/10 was 1.15  CMP on 5/12/25 showed creatinine stable at 1.1  Today 5/15, creat is up to 1.25  Will need to watch and repeat BMP qd x 3 = if creat up more, consult Nephrology and may need to stop Metformin +/- adjust Januvia dose  Sepsis (HCC)  Resolved  Continue to monitor off antibiotics  E coli bacteremia  Patient completed 7 days of IV cefazolin which was finished on May 5  Was due to E. Coli UTI  HTN (hypertension)  Home:  Losartan -25 qd  Here:  no meds for now since he is normotensive  Transaminitis  AST is 111, previously 114,  ALT is 322 previously 149  D/w H-O,  "they are aware =  \"Could be due to recent chemotherapy versus antibiotics versus liver lesions\".    CMP 5/9/25 = AST 57 (previously 111),  (previously 322)  Almost totally resolved now with AST 57 and   Leukopenia  WBC was stable at 5.9 on 5/12/25 but today 5/15/25 is down to 3.3  H-O following  CBC 5/19/25  Hyponatremia  Mild   CMP 5/12/25 now shows resolution of the hyponatremia  Thrombocytopenia (HCC)  Platelet count down to 117 from 160 on 5/12/25 and today 5/15/25  is 115  ?from chemo  Per H-O  Anemia  Hemoglobin stable at 9.0  CBC 5/19/25    The above assessment and plan was reviewed and updated as determined by my evaluation of the patient on 5/15/2025.    History of Present Illness   Patient seen and examined. Patients overnight issues or events were reviewed with nursing staff. New or overnight issues include the following:   No new or overnight issues.  Offers no complaints.    Review of Systems   All other systems reviewed and are negative.      Objective :  Temp:  [97.8 °F (36.6 °C)-98 °F (36.7 °C)] 98 °F (36.7 °C)  HR:  [69-88] 69  BP: (104-128)/(75-76) 104/76  Resp:  [16-18] 18  SpO2:  [97 %-98 %] 98 %  O2 Device: None (Room air)    Invasive Devices       Peripherally Inserted Central Catheter Line  Duration             PICC Line 05/01/25 Left Brachial 13 days              Drain  Duration             External Urinary Catheter 12 days                    Physical Exam  General Appearance: no distress, non toxic appearing  HEENT: PERRLA, conjuctiva normal; oropharynx clear; mucous membranes moist   Neck:  Supple, normal ROM  Lungs: CTA, normal respiratory effort, no retractions, expiratory effort normal  CV: regular rate and rhythm; no rubs/murmurs/gallops, PMI normal   ABD: soft; ND/NT; +BS  EXT: no edema  Skin: normal turgor, normal texture  Psych: affect flat and mood subdued  Neuro: AA        The above physical exam was reviewed and updated as determined by my evaluation of the patient " on 5/15/2025.      Lab Results: I have reviewed the following results:  Results from last 7 days   Lab Units 05/15/25  0600 05/12/25  0614   WBC Thousand/uL 3.35* 5.16   HEMOGLOBIN g/dL 9.0* 9.3*   HEMATOCRIT % 26.1* 26.4*   PLATELETS Thousands/uL 115* 117*     Results from last 7 days   Lab Units 05/15/25  0600 05/12/25  0614   SODIUM mmol/L 136 136   POTASSIUM mmol/L 3.9 4.3   CHLORIDE mmol/L 98 101   CO2 mmol/L 29 30   BUN mg/dL 27* 33*   CREATININE mg/dL 1.25 1.11   CALCIUM mg/dL 8.5 9.0             Results from last 7 days   Lab Units 05/15/25  1049 05/15/25  0610 05/14/25  2058   POC GLUCOSE mg/dl 212* 153* 161*       Imaging Results Review: No pertinent imaging studies reviewed.  Other Study Results Review: No additional pertinent studies reviewed.    Review of Scheduled Meds: Medications  reviewed and reconciled as needed  Current Facility-Administered Medications   Medication Dose Route Frequency Provider Last Rate    acetaminophen  650 mg Oral Q6H PRN Crispin Arana MD      allopurinol  300 mg Oral Daily Crispin Arana MD      alteplase  2 mg Intracatheter Q1MIN PRN Crispin Arana MD      alteplase  2 mg Intracatheter Q1MIN PRN Magali Lee MD      aluminum-magnesium hydroxide-simethicone  30 mL Oral Q4H PRN Crispin Arana MD      atorvastatin  20 mg Oral Daily Crispin Arana MD      bisacodyl  10 mg Rectal Daily PRN Jessica Arreola DO      calcium carbonate  1,000 mg Oral Daily PRN Crispin Arana MD      chlorhexidine  15 mL Mouth/Throat Q12H [facility] Crispin Arana MD      dexamethasone  1 mg Oral Q12H [facility] Crispin Arana MD      docusate sodium  100 mg Oral BID Jessica Arreola,       heparin (porcine)  5,000 Units Subcutaneous Q8H CAIT Arana MD      insulin lispro  2-12 Units Subcutaneous 4x Daily (AC & HS) Crispin Arana MD      insulin lispro  6 Units Subcutaneous TID With Meals PATI Porras      lactulose  20 g Oral Daily PRN Joshua Kaminski,       melatonin  6 mg Oral HS  Crispin Arana MD      metFORMIN  1,000 mg Oral Daily Crispin Arana MD      OLANZapine  5 mg Intramuscular BID PRN Crispin Arana MD      ondansetron  4 mg Intravenous Q6H PRN Crispin Arana MD      oxyCODONE  2.5 mg Oral Q4H PRN Crispin Arana MD      Or    oxyCODONE  5 mg Oral Q4H PRN Crispin Arana MD      pantoprazole  40 mg Oral Early Morning Crispin Arana MD      polyethylene glycol  17 g Oral Daily PRN Jessica T Arreola, DO      QUEtiapine  12.5 mg Oral Daily Crispin Arana MD      QUEtiapine  50 mg Oral HS Crispin Arana MD      senna  2 tablet Oral Daily With Lunch Jessica BHUPENDRA Arreola, DO      sitaGLIPtin  50 mg Oral Daily PATI Porras      sodium chloride  20 mL/hr Intravenous Once PRN Crispin Arana MD      sodium chloride  20 mL/hr Intravenous Once PRN Magali Lee MD         VTE Pharmacologic Prophylaxis: HSQ  Code Status: Level 1 - Full Code  Current Length of Stay: 8 day(s)    Administrative Statements     ** Please Note:  voice to text software may have been used in the creation of this document. Although proof errors in transcription or interpretation are a potential of such software**

## 2025-05-15 NOTE — PROGRESS NOTES
"OT daily tx note     05/15/25 0900   Pain Assessment   Pain Assessment Tool 0-10   Pain Score No Pain   Restrictions/Precautions   Precautions Bed/chair alarms;Cognitive;Fall Risk;1:1;Supervision on toilet/commode   Weight Bearing Restrictions No   ROM Restrictions No   Lifestyle   Autonomy \"I really hate hospitals\"   Shower/Bathe Self   Type of Assistance Needed Incidental touching   Physical Assistance Level No physical assistance   Comment completed shower routine mostly seated on TTB and CGA needed for walking into shower. pt able to wash 10/10 body parts w/ dec VC needed for sequencing, intitiation, safety. PICC line covered on LUE. cont to rec min-SUP for shower routine and wife will be in over wknd to observe ADL routine and appropriate VC   Shower/Bathe Self CARE Score 4   Upper Body Dressing   Type of Assistance Needed Supervision   Physical Assistance Level No physical assistance   Comment seated and min VC   Upper Body Dressing CARE Score 4   Lower Body Dressing   Type of Assistance Needed Incidental touching   Physical Assistance Level No physical assistance   Comment seated and min VC for sequencing; CGA in stance   Lower Body Dressing CARE Score 4   Putting On/Taking Off Footwear   Type of Assistance Needed Supervision   Physical Assistance Level No physical assistance   Comment seated and don/doff via crossed leg tech   Putting On/Taking Off Footwear CARE Score 4   Sit to Stand   Type of Assistance Needed Supervision   Physical Assistance Level No physical assistance   Comment CS w/o AD   Sit to Stand CARE Score 4   Bed-Chair Transfer   Type of Assistance Needed Supervision   Physical Assistance Level No physical assistance   Comment CS w/o AD   Chair/Bed-to-Chair Transfer CARE Score 4   Functional Standing Tolerance   Time 5 min   Activity card matching   Comments Pt participated in standing activity using standing board and sorting cards. Pt completed standing at tabletop and able to tolerate ~5 min " of standing w/ G functional reach/balance. Pt instructed to sort cards by suit w/ 3 errors that were corrected w/ min VC. No LOB/SOB noted. Pt educated on sig of activity and energy conservation tech. While seated pt then instructed to sort cards in order for each suit x4. Min-mod VC needed to recheck work and able to fiz mistakes. Pt benefits from activity to improve endurance, standing tolerance, balance, working memory, functional reach, problem solving, and activity tolerance to faciltitate ind w/ ADLs.   Exercise Tools   Exercise Tools Yes   UE Ergometer Pt engaged in UB strengthening using UE ergometer for 4 min prograde and 4 min retrograde with Good tolerance. Pt educated on energy conservation techniques and utilized rest breaks as needed. Pt benefits from activity to improve UB strengthening, activity tolerance, and endurance to facilitate independence w/ ADL/IADLs.   Cognition   Overall Cognitive Status Impaired   Arousal/Participation Cooperative;Lethargic   Attention Attends with cues to redirect   Orientation Level Oriented to person   Memory Decreased recall of biographical information;Decreased recall of recent events;Decreased long term memory;Decreased short term memory;Decreased recall of precautions   Following Commands Follows one step commands with increased time or repetition   Activity Tolerance   Activity Tolerance Patient limited by fatigue   Assessment   Treatment Assessment Pt engaged in skilled OT session with focus on ADL Retraining , LB Dressing, UB dressing, Functional Transfers, Functional Cognition, Functional Attention, Standing tolerance, Standing balance , Gross motor strengthening , Energy conservation training/education, healthy coping education, Leisure and social pursuits, and community re-integration. Pt is limited by weakness, impaired balance, decreased endurance, increased fall risk, decreased ADLS, decreased IADLS, decreased activity tolerance, decreased safety  awareness, impaired judgement, and decreased cognition. Shower routine completed this AM and pt was fatigued t/o session. Despite fatigue pt seemed to perform better w/ ADL routine and min-mod VC needed for initiation, sequencing. Endurance traing, UB strengthening and functional cog all incorporated into session. Upcoming sessions to focus on balance retaining, functional cog, endurance, standing delfin, and cont ADL retraining. OT services are warranted to address above barriers.   Prognosis Fair   Problem List Decreased strength;Decreased range of motion;Decreased endurance;Impaired balance;Decreased coordination;Decreased mobility;Decreased cognition;Impaired judgement;Decreased safety awareness   Barriers to Discharge Inaccessible home environment;Decreased caregiver support   Plan   Treatment/Interventions ADL retraining;Functional transfer training;Therapeutic exercise;Endurance training;Patient/family training   Progress Progressing toward goals   OT Therapy Minutes   OT Time In 0900   OT Time Out 1030   OT Total Time (minutes) 90   OT Mode of treatment - Individual (minutes) 90   OT Mode of treatment - Concurrent (minutes) 0   OT Mode of treatment - Group (minutes) 0   OT Mode of treatment - Co-treat (minutes) 0   OT Mode of Treatment - Total time(minutes) 90 minutes   OT Cumulative Minutes 615   Therapy Time missed   Time missed? No

## 2025-05-15 NOTE — ASSESSMENT & PLAN NOTE
Hemoglobin A1C was 6.6 on 2/22/25  Sees [facility] in [place]  Home:  Janumet XR  qd  Here: Lispro 6 U TID/Metformin XR 1000 mg qd/Januvia 50mg qd  On 5/12/25, increased Lispro WM to 6U TID  Restarted Januvia 50 mg qd on 5/14/25 and stopped the Lantus 5/13/25.  Kept Lispro WM same.  Continue DM diet  No changes today 5/15/25  BSs should continue to improve with steroid being tapered (LD will be 5/19)  To have BMP qd x 3 and Metformin may need to be held if creatinine is rising +/- adjust Januvia   Yes

## 2025-05-15 NOTE — ASSESSMENT & PLAN NOTE
TSH/free T4 4/21/25 = 0.019/1.13  Nonemergent outpatient endocrine follow-up.   Will require outpatient TSH, free T4, +/- further imaging with endocrine such as radioactive uptake  Repeat TSH/free T4 added on to 5/12's labs = TSH was 0.287 with free T4 0.85

## 2025-05-15 NOTE — PROGRESS NOTES
05/15/25 1300   Pain Assessment   Pain Assessment Tool 0-10   Pain Score No Pain   Restrictions/Precautions   Precautions Bed/chair alarms;Cognitive;1:1;Fall Risk;Supervision on toilet/commode   Weight Bearing Restrictions No   ROM Restrictions No   Comprehension   Comprehension (FIM) 2 - Understands only simple expressions or gestures (waves, hello)   Expression   Expression (FIM) 3 - Expresses basic info/needs 50-74% of time   Social Interaction   Social Interaction (FIM) 4 - Interacts 75-89% of time   Problem Solving   Problem solving (FIM) 1 - Solves basic problems less than 25% of time   Memory   Memory (FIM) 1 - Recognizes, recalls/performs less than 25%   Speech/Language/Cognition Assessmetn   Treatment Assessment Pt seen for PM skilled speech therapy session targeting cognitive linguistic communication skills. Pt sleeping upon arrival- per 1:1 pt had been resting for a while since after lunch. Pt to have PT following SLP session therefore SLP able to wake to have him participate. Pt completed the following- functional problem solving with alternative actions- SLP gave pt a situation and a solution which did not fix problem. Pt instructed to come up with additional solution- pt needing Max A to complete given slower processing and poor thought organization. Pt also giving solutions which did not make sense within the situation- suspect some incorrect word choice as well. Question overall fatigue impacting this task at this time. Engaged in simple word finding task- able to complete divergent naming task with concrete categories- 14/18acc increasing with semantic and verbal cues. Pt then requesting to use bathroom again- able to ambulate using RW. Pt was continent of urine, needing some assistance with clothing management and cues to wash hands. Pt also needing cues for sequencing of handwashing with ID soap dispenser. Ambulated back to bed when PT arrived. Pt overall is improving with skilled SLP services  and will cont to benefit to maximize overall cognitive linguistic communication abilities at this time.   SLP Therapy Minutes   SLP Time In 1300   SLP Time Out 1330   SLP Total Time (minutes) 30   SLP Mode of treatment - Individual (minutes) 30   SLP Mode of treatment - Concurrent (minutes) 0   SLP Mode of treatment - Group (minutes) 0   SLP Mode of treatment - Co-treat (minutes) 0   SLP Mode of Treatment - Total time(minutes) 30 minutes   SLP Cumulative Minutes 390   Therapy Time missed   Time missed? No

## 2025-05-15 NOTE — PLAN OF CARE
Problem: Prexisting or High Potential for Compromised Skin Integrity  Goal: Skin integrity is maintained or improved  Description: INTERVENTIONS:- Identify patients at risk for skin breakdown- Assess and monitor skin integrity- Assess and monitor nutrition and hydration status- Monitor labs - Assess for incontinence - Turn and reposition patient- Assist with mobility/ambulation- Relieve pressure over bony prominences- Avoid friction and shearing- Provide appropriate hygiene as needed including keeping skin clean and dry- Evaluate need for skin moisturizer/barrier cream- Collaborate with interdisciplinary team - Patient/family teaching- Consider wound care consult   Outcome: Progressing     Problem: SAFETY ADULT  Goal: Patient will remain free of falls  Description: INTERVENTIONS:- Educate patient/family on patient safety including physical limitations- Instruct patient to call for assistance with activity - Consult OT/PT to assist with strengthening/mobility - Keep Call bell within reach- Keep bed low and locked with side rails adjusted as appropriate- Keep care items and personal belongings within reach- Initiate and maintain comfort rounds- Make Fall Risk Sign visible to staff- Offer Toileting every 2 Hours, in advance of need- Initiate/Maintain bed/chair alarm- Obtain necessary fall risk management equipment: non skid footwear- Apply yellow socks and bracelet for high fall risk patients- Consider moving patient to room near nurses station  Outcome: Progressing  Goal: Maintain or return to baseline ADL function  Description: INTERVENTIONS:-  Assess patient's ability to carry out ADLs; assess patient's baseline for ADL function and identify physical deficits which impact ability to perform ADLs (bathing, care of mouth/teeth, toileting, grooming, dressing, etc.)- Assess/evaluate cause of self-care deficits - Assess range of motion- Assess patient's mobility; develop plan if impaired- Assess patient's need for  assistive devices and provide as appropriate- Encourage maximum independence but intervene and supervise when necessary- Involve family in performance of ADLs- Assess for home care needs following discharge - Consider OT consult to assist with ADL evaluation and planning for discharge- Provide patient education as appropriate  Outcome: Progressing  Goal: Maintains/Returns to pre admission functional level  Description: INTERVENTIONS:- Perform AM-PAC 6 Click Basic Mobility/ Daily Activity assessment daily.- Set and communicate daily mobility goal to care team and patient/family/caregiver. - Collaborate with rehabilitation services on mobility goals if consulted- Perform Range of Motion 3 times a day.- Reposition patient every 2 hours.- Dangle patient 3 times a day- Stand patient 3 times a day- Ambulate patient 3 times a day- Out of bed to chair 3 times a day - Out of bed for meals 3 times a day- Out of bed for toileting- Record patient progress and toleration of activity level   Outcome: Progressing

## 2025-05-15 NOTE — PROGRESS NOTES
05/15/25 1330   Pain Assessment   Pain Assessment Tool 0-10   Pain Score No Pain   Restrictions/Precautions   Precautions Bed/chair alarms;Cognitive;Fall Risk;Impulsive;Supervision on toilet/commode;1:1   Weight Bearing Restrictions No   ROM Restrictions No   Cognition   Overall Cognitive Status Impaired   Arousal/Participation Responsive;Cooperative   Attention Attends with cues to redirect   Orientation Level Oriented to person   Memory Decreased recall of biographical information;Decreased recall of recent events;Decreased long term memory;Decreased short term memory;Decreased recall of precautions   Following Commands Follows multistep commands inconsistently   Sit to Stand   Type of Assistance Needed Supervision;Adaptive equipment   Comment rollator   Sit to Stand CARE Score 4   Bed-Chair Transfer   Type of Assistance Needed Supervision;Adaptive equipment   Comment rollator   Chair/Bed-to-Chair Transfer CARE Score 4   Transfer Bed/Chair/Wheelchair   Adaptive Equipment Rollator   Walk 10 Feet   Type of Assistance Needed Supervision;Adaptive equipment   Comment rollator   Walk 10 Feet CARE Score 4   Walk 50 Feet with Two Turns   Type of Assistance Needed Supervision;Adaptive equipment   Comment CS with RW/rollator   Walk 50 Feet with Two Turns CARE Score 4   Walk 150 Feet   Type of Assistance Needed Supervision;Adaptive equipment   Comment CS with RW/rollator   Walk 150 Feet CARE Score 4   Ambulation   Primary Mode of Locomotion Prior to Admission Walk   Distance Walked (feet) 150 ft  (x3)   Assist Device Rollator;Roller Walker   Gait Pattern Slow Vicki;Decreased foot clearance;Narrow IRMA;Shuffle;Step to;Step through;Improper weight shift   Limitations Noted In Balance;Heel Strike;Posture;Safety;Speed;Strength;Swing   Provided Assistance with: Direction   Walk Assist Level Close Supervision   Does the patient walk? 2. Yes   Therapeutic Interventions   Neuromuscular Re-Education Amb ball toss fwd/bwds x120'  "CGA, standing on blue balance disc ball toss with pt naming objects correlating to letter on ball it would land on. Increased time and at times MOD VC's to help with an answer.   Equipment Use   NuStep BLE /UE x10 min L3   Assessment   Treatment Assessment Pt participated in skilled PT session with increased focus on use of rollator, balance, duel tasking and righting reactions. Pt required VC\"s with brake management and will not be able to carryover well so wife will need to be educated on brake safety of rollator. Pt remained CS with rollator and had no overt LOB seen. 2/2 to pt's slow gait speed the rollator will be ok for community access. Pt had notable difficulty with naming objects based on the letter on the ball. At times her would say something not related and required VC's to get back on task. MOD VC's required to assist pt with word finding during act. The rollator was left in his room to be used with staff and other therapies to increase carryover. Cont POC as tolerated with cont focus on gait, balance, increased carryover of new learning and safety awareness to decrease burden of care.   Problem List Decreased strength;Decreased range of motion;Decreased endurance;Impaired balance;Decreased coordination;Decreased mobility;Decreased cognition;Impaired judgement;Decreased safety awareness   Barriers to Discharge Inaccessible home environment;Decreased caregiver support   PT Barriers   Functional Limitation Car transfers;Stair negotiation;Standing;Transfers;Walking   Plan   Treatment/Interventions Functional transfer training;LE strengthening/ROM;Therapeutic exercise;Endurance training;Cognitive reorientation;Patient/family training;Gait training   Progress Progressing toward goals   PT Therapy Minutes   PT Time In 1330   PT Time Out 1430   PT Total Time (minutes) 60   PT Mode of treatment - Individual (minutes) 60   PT Mode of treatment - Concurrent (minutes) 0   PT Mode of treatment - Group (minutes) 0 "   PT Mode of treatment - Co-treat (minutes) 0   PT Mode of Treatment - Total time(minutes) 60 minutes   PT Cumulative Minutes 506   Therapy Time missed   Time missed? No

## 2025-05-15 NOTE — ASSESSMENT & PLAN NOTE
Lab Results   Component Value Date    HGBA1C 6.6 (H) 02/22/2025       Recent Labs     05/14/25  1043 05/14/25  1540 05/14/25  2058 05/15/25  0610   POCGLU 243* 118 161* 153*       Blood Sugar Average: Last 72 hrs:  (P) [id number]    - Metformin 1000mg daily  - Lantus 10u daily with Lispro 8u TID and SSI  - Monitor accuchecks while on steroids  - Management per IM

## 2025-05-16 LAB
AMORPH URATE CRY URNS QL MICRO: ABNORMAL
ANION GAP SERPL CALCULATED.3IONS-SCNC: 8 MMOL/L (ref 4–13)
BACTERIA UR QL AUTO: ABNORMAL /HPF
BACTERIA UR QL AUTO: ABNORMAL /HPF
BILIRUB UR QL STRIP: NEGATIVE
BILIRUB UR QL STRIP: NEGATIVE
BUDDING YEAST: PRESENT
BUN SERPL-MCNC: 27 MG/DL (ref 5–25)
CALCIUM SERPL-MCNC: 8.8 MG/DL (ref 8.4–10.2)
CHLORIDE SERPL-SCNC: 98 MMOL/L (ref 96–108)
CLARITY UR: ABNORMAL
CLARITY UR: ABNORMAL
CO2 SERPL-SCNC: 29 MMOL/L (ref 21–32)
COLOR UR: ABNORMAL
COLOR UR: ABNORMAL
CREAT SERPL-MCNC: 1.19 MG/DL (ref 0.6–1.3)
GFR SERPL CREATININE-BSD FRML MDRD: 63 ML/MIN/1.73SQ M
GLUCOSE P FAST SERPL-MCNC: 131 MG/DL (ref 65–99)
GLUCOSE SERPL-MCNC: 130 MG/DL (ref 65–140)
GLUCOSE SERPL-MCNC: 131 MG/DL (ref 65–140)
GLUCOSE SERPL-MCNC: 143 MG/DL (ref 65–140)
GLUCOSE SERPL-MCNC: 161 MG/DL (ref 65–140)
GLUCOSE SERPL-MCNC: 97 MG/DL (ref 65–140)
GLUCOSE UR STRIP-MCNC: NEGATIVE MG/DL
GLUCOSE UR STRIP-MCNC: NEGATIVE MG/DL
HGB UR QL STRIP.AUTO: ABNORMAL
HGB UR QL STRIP.AUTO: NEGATIVE
KETONES UR STRIP-MCNC: NEGATIVE MG/DL
KETONES UR STRIP-MCNC: NEGATIVE MG/DL
LEUKOCYTE ESTERASE UR QL STRIP: ABNORMAL
LEUKOCYTE ESTERASE UR QL STRIP: ABNORMAL
NITRITE UR QL STRIP: NEGATIVE
NITRITE UR QL STRIP: POSITIVE
NON-SQ EPI CELLS URNS QL MICRO: ABNORMAL /HPF
NON-SQ EPI CELLS URNS QL MICRO: ABNORMAL /HPF
PH UR STRIP.AUTO: 6.5 [PH]
PH UR STRIP.AUTO: 7.5 [PH]
POTASSIUM SERPL-SCNC: 4.2 MMOL/L (ref 3.5–5.3)
PROT UR STRIP-MCNC: NEGATIVE MG/DL
PROT UR STRIP-MCNC: NEGATIVE MG/DL
RBC #/AREA URNS AUTO: ABNORMAL /HPF
RBC #/AREA URNS AUTO: ABNORMAL /HPF
SODIUM SERPL-SCNC: 135 MMOL/L (ref 135–147)
SP GR UR STRIP.AUTO: 1.01 (ref 1–1.03)
SP GR UR STRIP.AUTO: 1.01 (ref 1–1.03)
UROBILINOGEN UR STRIP-ACNC: <2 MG/DL
UROBILINOGEN UR STRIP-ACNC: <2 MG/DL
WBC #/AREA URNS AUTO: ABNORMAL /HPF
WBC #/AREA URNS AUTO: ABNORMAL /HPF

## 2025-05-16 PROCEDURE — 97530 THERAPEUTIC ACTIVITIES: CPT

## 2025-05-16 PROCEDURE — 97130 THER IVNTJ EA ADDL 15 MIN: CPT

## 2025-05-16 PROCEDURE — 99232 SBSQ HOSP IP/OBS MODERATE 35: CPT | Performed by: INTERNAL MEDICINE

## 2025-05-16 PROCEDURE — 99232 SBSQ HOSP IP/OBS MODERATE 35: CPT | Performed by: STUDENT IN AN ORGANIZED HEALTH CARE EDUCATION/TRAINING PROGRAM

## 2025-05-16 PROCEDURE — 99232 SBSQ HOSP IP/OBS MODERATE 35: CPT | Performed by: NURSE PRACTITIONER

## 2025-05-16 PROCEDURE — NC001 PR NO CHARGE: Performed by: INTERNAL MEDICINE

## 2025-05-16 PROCEDURE — 82948 REAGENT STRIP/BLOOD GLUCOSE: CPT

## 2025-05-16 PROCEDURE — 81001 URINALYSIS AUTO W/SCOPE: CPT | Performed by: STUDENT IN AN ORGANIZED HEALTH CARE EDUCATION/TRAINING PROGRAM

## 2025-05-16 PROCEDURE — 81001 URINALYSIS AUTO W/SCOPE: CPT | Performed by: INTERNAL MEDICINE

## 2025-05-16 PROCEDURE — 97535 SELF CARE MNGMENT TRAINING: CPT

## 2025-05-16 PROCEDURE — 97129 THER IVNTJ 1ST 15 MIN: CPT

## 2025-05-16 PROCEDURE — 80048 BASIC METABOLIC PNL TOTAL CA: CPT | Performed by: NURSE PRACTITIONER

## 2025-05-16 RX ORDER — INSULIN LISPRO 100 [IU]/ML
1-5 INJECTION, SOLUTION INTRAVENOUS; SUBCUTANEOUS
Status: DISCONTINUED | OUTPATIENT
Start: 2025-05-16 | End: 2025-05-28 | Stop reason: HOSPADM

## 2025-05-16 RX ORDER — BISACODYL 10 MG
10 SUPPOSITORY, RECTAL RECTAL DAILY PRN
Status: DISCONTINUED | OUTPATIENT
Start: 2025-05-16 | End: 2025-05-28 | Stop reason: HOSPADM

## 2025-05-16 RX ORDER — POLYETHYLENE GLYCOL 3350 17 G/17G
17 POWDER, FOR SOLUTION ORAL DAILY PRN
Status: DISCONTINUED | OUTPATIENT
Start: 2025-05-16 | End: 2025-05-28 | Stop reason: HOSPADM

## 2025-05-16 RX ADMIN — Medication 2.5 MG: at 08:44

## 2025-05-16 RX ADMIN — ACETAMINOPHEN 650 MG: 325 TABLET ORAL at 08:44

## 2025-05-16 RX ADMIN — HEPARIN SODIUM 5000 UNITS: 5000 INJECTION INTRAVENOUS; SUBCUTANEOUS at 14:04

## 2025-05-16 RX ADMIN — QUETIAPINE 12.5 MG: 25 TABLET ORAL at 11:24

## 2025-05-16 RX ADMIN — SODIUM BICARBONATE: 84 INJECTION, SOLUTION INTRAVENOUS at 22:22

## 2025-05-16 RX ADMIN — INSULIN LISPRO 6 UNITS: 100 INJECTION, SOLUTION INTRAVENOUS; SUBCUTANEOUS at 16:56

## 2025-05-16 RX ADMIN — INSULIN LISPRO 6 UNITS: 100 INJECTION, SOLUTION INTRAVENOUS; SUBCUTANEOUS at 11:26

## 2025-05-16 RX ADMIN — INSULIN LISPRO 6 UNITS: 100 INJECTION, SOLUTION INTRAVENOUS; SUBCUTANEOUS at 07:58

## 2025-05-16 RX ADMIN — DOCUSATE SODIUM 100 MG: 100 CAPSULE, LIQUID FILLED ORAL at 08:00

## 2025-05-16 RX ADMIN — SENNOSIDES 17.2 MG: 8.6 TABLET, FILM COATED ORAL at 11:24

## 2025-05-16 RX ADMIN — SODIUM BICARBONATE: 84 INJECTION, SOLUTION INTRAVENOUS at 10:18

## 2025-05-16 RX ADMIN — HEPARIN SODIUM 5000 UNITS: 5000 INJECTION INTRAVENOUS; SUBCUTANEOUS at 06:20

## 2025-05-16 RX ADMIN — SITAGLIPTIN 50 MG: 50 TABLET, FILM COATED ORAL at 08:00

## 2025-05-16 RX ADMIN — ATORVASTATIN CALCIUM 20 MG: 20 TABLET, FILM COATED ORAL at 08:00

## 2025-05-16 RX ADMIN — DEXAMETHASONE 1 MG: 2 TABLET ORAL at 21:15

## 2025-05-16 RX ADMIN — METFORMIN ER 500 MG 1000 MG: 500 TABLET ORAL at 08:00

## 2025-05-16 RX ADMIN — DEXAMETHASONE 1 MG: 2 TABLET ORAL at 08:00

## 2025-05-16 RX ADMIN — INSULIN LISPRO 1 UNITS: 100 INJECTION, SOLUTION INTRAVENOUS; SUBCUTANEOUS at 11:26

## 2025-05-16 RX ADMIN — CHLORHEXIDINE GLUCONATE 15 ML: 1.2 SOLUTION ORAL at 21:14

## 2025-05-16 RX ADMIN — DOCUSATE SODIUM 100 MG: 100 CAPSULE, LIQUID FILLED ORAL at 16:57

## 2025-05-16 RX ADMIN — PANTOPRAZOLE SODIUM 40 MG: 40 TABLET, DELAYED RELEASE ORAL at 06:20

## 2025-05-16 RX ADMIN — CHLORHEXIDINE GLUCONATE 15 ML: 1.2 SOLUTION ORAL at 08:00

## 2025-05-16 RX ADMIN — Medication 6 MG: at 21:18

## 2025-05-16 RX ADMIN — ALLOPURINOL 300 MG: 300 TABLET ORAL at 08:00

## 2025-05-16 RX ADMIN — QUETIAPINE 50 MG: 25 TABLET ORAL at 21:15

## 2025-05-16 RX ADMIN — HEPARIN SODIUM 5000 UNITS: 5000 INJECTION INTRAVENOUS; SUBCUTANEOUS at 21:14

## 2025-05-16 NOTE — ASSESSMENT & PLAN NOTE
Hematology is following.   Needs high dose methotrexate - to be given tomorrow if urine pH is at goal.   Will need urinary alkalinization with methotrexate administration.   Goal urine pH is 7.0 or above.   Start sodium bicarbonate drip at 100 cc/hr.   Check urine pH every 8 hours.

## 2025-05-16 NOTE — ASSESSMENT & PLAN NOTE
Baseline creatinine 0.8 to 1.1.   Etiology suspected to be ATN +/- prerenal azotemia.   Peak SCr 2.77 on 4/22/25.   LETY resolved.   Stable renal function. SCr 1.19

## 2025-05-16 NOTE — PROGRESS NOTES
NEPHROLOGY HOSPITAL PROGRESS NOTE   Alexis Dennison 64 y.o. male MRN: [id number]  Unit/Bed#: -01 Encounter: [id number]  Reason for Consult: LETY  Assessment & Plan  Primary central nervous system (CNS) lymphoma  Hematology is following.   Needs high dose methotrexate - to be given tomorrow if urine pH is at goal.   Will need urinary alkalinization with methotrexate administration.   Goal urine pH is 7.0 or above.   Start sodium bicarbonate drip at 100 cc/hr.   Check urine pH every 8 hours.     LETY (acute kidney injury) (HCC)  Baseline creatinine 0.8 to 1.1.   Etiology suspected to be ATN +/- prerenal azotemia.   Peak SCr 2.77 on 4/22/25.   LETY resolved.   Stable renal function. SCr 1.19    HTN (hypertension)  BP is at goal.   Not on BP meds.       TODAY's DISCUSSION / PLAN:  Stable renal function.   Start sterile water + 150 meq of sodium bicarbonate at 100 cc/hr  Check urine pH q8H - goal is 7.0 or above.   Check daily BMP and monitor CO2, K, Ca.     SUBJECTIVE / 24H INTERVAL HISTORY:  No new acute complaints.   Doing okay with rehab.   We were reconsulted today due to needing high dose methotrexate and urinary alkalinization.   No CP or SOB.     OBJECTIVE:  Current Weight: Weight - Scale: 73.6 kg (162 lb 4.8 oz)  Vitals:    05/15/25 2035 05/16/25 0600 05/16/25 0631 05/16/25 1254   BP: 109/69  115/70 111/68   BP Location: Right arm  Right arm Right arm   Pulse: 81  78 73   Resp: 16  18 18   Temp: 97.5 °F (36.4 °C)  98.3 °F (36.8 °C) 97.6 °F (36.4 °C)   TempSrc: Oral  Oral Oral   SpO2: 98%  98% 97%   Weight:  73.6 kg (162 lb 4.8 oz) 73.6 kg (162 lb 4.8 oz)    Height:           Intake/Output Summary (Last 24 hours) at 5/16/2025 1507  Last data filed at 5/16/2025 1214  Gross per 24 hour   Intake 420 ml   Output --   Net 420 ml     General: conscious, cooperative, no distress  Skin: dry  Eyes: pink conjunctivae  ENT: moist mucous membranes  Respiratory: equal chest expansion, clear breath sounds.  Cardiovascular:  distinct heart sounds, normal rate, regular rhythm, no rub  Abdomen: soft, non tender, non distended, normal bowel sounds  Extremities: no edema.   Genitourinary: no connell catheter.   Neuro: awake, alert.   Psych: appropriate affect    Medications:    Current Facility-Administered Medications:     acetaminophen (TYLENOL) tablet 650 mg, 650 mg, Oral, Q6H PRN, Crispin Arana MD, 650 mg at 05/16/25 0844    allopurinol (ZYLOPRIM) tablet 300 mg, 300 mg, Oral, Daily, Crispin Arana MD, 300 mg at 05/16/25 0800    alteplase (CATHFLO) injection 2 mg, 2 mg, Intracatheter, Q1MIN PRN, Crispin Arana MD    alteplase (CATHFLO) injection 2 mg, 2 mg, Intracatheter, Q1MIN PRN, Magali Lee MD    aluminum-magnesium hydroxide-simethicone (MAALOX) oral suspension 30 mL, 30 mL, Oral, Q4H PRN, Crispin Arana MD    atorvastatin (LIPITOR) tablet 20 mg, 20 mg, Oral, Daily, Crispin Arana MD, 20 mg at 05/16/25 0800    bisacodyl (DULCOLAX) rectal suppository 10 mg, 10 mg, Rectal, Daily PRN, Skyler Hendrickson MD    calcium carbonate (TUMS) chewable tablet 1,000 mg, 1,000 mg, Oral, Daily PRN, Crispin Arana MD    chlorhexidine (PERIDEX) 0.12 % oral rinse 15 mL, 15 mL, Mouth/Throat, Q12H CAIT, Crispin Arana MD, 15 mL at 05/16/25 0800    [COMPLETED] dexamethasone (DECADRON) tablet 4 mg, 4 mg, Oral, Q12H CAIT, 4 mg at 05/11/25 0908 **FOLLOWED BY** [COMPLETED] dexamethasone (DECADRON) tablet 2 mg, 2 mg, Oral, Q12H CAIT, 2 mg at 05/15/25 0803 **FOLLOWED BY** dexamethasone (DECADRON) tablet 1 mg, 1 mg, Oral, Q12H CAIT, Crispin Arana MD, 1 mg at 05/16/25 0800    docusate sodium (COLACE) capsule 100 mg, 100 mg, Oral, BID, Jessica Arreola DO, 100 mg at 05/16/25 0800    heparin (porcine) subcutaneous injection 5,000 Units, 5,000 Units, Subcutaneous, Q8H [facility], Crispin Arana MD, 5,000 Units at 05/16/25 1404    insulin lispro (HumALOG/ADMELOG) 100 units/mL subcutaneous injection 1-5 Units, 1-5 Units, Subcutaneous, TID AC, 1 Units at 05/16/25 1126 **AND**  Fingerstick Glucose (POCT), , , TID AC, PATI Porras    insulin lispro (HumALOG/ADMELOG) 100 units/mL subcutaneous injection 1-5 Units, 1-5 Units, Subcutaneous, HS, PATI Porras    insulin lispro (HumALOG/ADMELOG) 100 units/mL subcutaneous injection 6 Units, 6 Units, Subcutaneous, TID With Meals, PATI Porras, 6 Units at 05/16/25 1126    lactulose (CHRONULAC) oral solution 20 g, 20 g, Oral, Daily PRN, Joshua Kaminski DO, 20 g at 05/14/25 1805    melatonin tablet 6 mg, 6 mg, Oral, HS, Crispin Arana MD, 6 mg at 05/15/25 2128    metFORMIN (GLUCOPHAGE-XR) 24 hr tablet 1,000 mg, 1,000 mg, Oral, Daily, Crispin Arana MD, 1,000 mg at 05/16/25 0800    OLANZapine (ZyPREXA) IM injection 5 mg, 5 mg, Intramuscular, BID PRN, Crispin Arana MD    ondansetron (ZOFRAN) injection 4 mg, 4 mg, Intravenous, Q6H PRN, Crispin Arana MD    oxyCODONE (ROXICODONE) split tablet 2.5 mg, 2.5 mg, Oral, Q4H PRN, 2.5 mg at 05/16/25 0844 **OR** oxyCODONE (ROXICODONE) IR tablet 5 mg, 5 mg, Oral, Q4H PRN, Crispin Arana MD    pantoprazole (PROTONIX) EC tablet 40 mg, 40 mg, Oral, Early Morning, Crispin Arana MD, 40 mg at 05/16/25 0620    polyethylene glycol (MIRALAX) packet 17 g, 17 g, Oral, Daily PRN, Skyler Hendrickson MD    QUEtiapine (SEROquel) tablet 12.5 mg, 12.5 mg, Oral, Daily, Crispin Arana MD, 12.5 mg at 05/16/25 1124    QUEtiapine (SEROquel) tablet 50 mg, 50 mg, Oral, HS, Crispin Arana MD, 50 mg at 05/15/25 2128    senna (SENOKOT) tablet 17.2 mg, 2 tablet, Oral, Daily With Lunch, Jessica Arreola DO, 17.2 mg at 05/16/25 1124    sitaGLIPtin (JANUVIA) tablet 50 mg, 50 mg, Oral, Daily, PATI Porras, 50 mg at 05/16/25 0800    sodium bicarbonate 150 mEq in sterile water 1,000 mL infusion, , Intravenous, Continuous, Kuldip Medina MD, Last Rate: 100 mL/hr at 05/16/25 1018, New Bag at 05/16/25 1018    sodium chloride 0.9 % infusion, 20 mL/hr, Intravenous, Once PRN, Crispin  "MD Yasmeen    sodium chloride 0.9 % infusion, 20 mL/hr, Intravenous, Once PRN, Magali Lee MD    Laboratory Results:  Results from last 7 days   Lab Units 05/16/25  0618 05/15/25  0600 05/12/25  0614 05/10/25  1023   WBC Thousand/uL  --  3.35* 5.16  --    HEMOGLOBIN g/dL  --  9.0* 9.3*  --    HEMATOCRIT %  --  26.1* 26.4*  --    PLATELETS Thousands/uL  --  115* 117*  --    POTASSIUM mmol/L 4.2 3.9 4.3 4.4   CHLORIDE mmol/L 98 98 101 98   CO2 mmol/L 29 29 30 25   BUN mg/dL 27* 27* 33* 34*   CREATININE mg/dL 1.19 1.25 1.11 1.15   CALCIUM mg/dL 8.8 8.5 9.0 9.1   MAGNESIUM mg/dL  --   --  1.4*  --    PHOSPHORUS mg/dL  --   --  2.7  --      Portions of the record may have been created with voice recognition software. Occasional wrong word or \"sound a like\" substitutions may have occurred due to the inherent limitations of voice recognition software. Read the chart carefully and recognize, using context, where substitutions have occurred.If you have any questions, please contact the dictating provider.  "

## 2025-05-16 NOTE — ASSESSMENT & PLAN NOTE
Patient has noted to have fluctuating thrombocytopenia, however platelet counts remain above 100 K.  Will monitor CBC in AM.  No concerns for spontaneous bleeding or excessive bruising.

## 2025-05-16 NOTE — PROGRESS NOTES
Progress Note - PMR   Name: Alexis Dennison 64 y.o. 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  > Started on high dose steroids and methotrexate every 2 weeks for 4 weeks (C1 received 4/18, C2 received 5/2) then maintenance every 4 weeks with Rituximab weekly for 8 weeks (1st dose Thursday 4/24 and second 5/3)  > Admitted to [facility] with ongoing cognitive deficits and delirium    - Continue Decadron 2 mg Q12 on taper  - Continue MTX + Rituximab as per Hem/onc  - Daily methotrexate levels  - Hem/Onc following  - NSGY following  - SLP for cognition  Type 2 diabetes mellitus with hyperglycemia, without long-term current use of insulin (HCC)  Lab Results   Component Value Date    HGBA1C 6.6 (H) 02/22/2025       Recent Labs     05/15/25  1556 05/15/25  2113 05/16/25  0622 05/16/25  1008   POCGLU 161* 114 143* 161*       Blood Sugar Average: Last 72 hrs:  (P) [id number]    - Metformin 1000mg daily  - Lantus 10u daily with Lispro 8u TID and SSI  - Monitor accuchecks while on steroids  - Management per IM  HTN, goal below 130/80  > Blood pressure controlled off antihypertensives    - Monitor VS  - Management per IM  Mixed hyperlipidemia  - Continue atorvastatin 10 mg daily  Thyroid nodule    Pulmonary nodule  > CTA 3/29: nonspecific 4 mm RLL pulmonary nodule   - follow up outpatient for repeat imaging in 3 months  Liver lesion  >  CTA 3/29- nonspecific 12mm hypodense right hepatic lesion, recommended MRI abdomen  > MRI abdomen 3/30- simple right hepatic lobe cyst    - Follow up outpatient for monitoring  Ambulatory dysfunction  - Fall precautions  - PT/OT  Encephalopathy  > Patient has been pleasantly confused for months in the setting of brain mass.  > acutely worsened due to UTI and bacteremia. S/p 7 day course of antibx  > Continues to lack insight to his current situation and is severely cognitively impaired.  He does not get agitated for long periods of time and is mostly confused and impulsive.  > A&O 1 on [facility] admission    - Continue seroquel 12.5mg at noon with 50mg HS  - PRN Zyprexa 2.5mg IM as needed for agitation  -Overstimulation precautions, frequent re-orientation, re-direction, re-assurance  -Sleep log and agitation monitoring if needed  -Optimize sleep-wake cycle  -Limit sedating medications when possible  - Ensure optimal management electrolytes, nutrition, and hydration  - Ensure optimal bowel/bladder management  - Ensure optimal pain management   -Patient/family/caregiver education and training   -Continue 1:1 due to overall safety, impulsivity, lack of safety awareness and poor insight.  -Fall precautions with frequent rounding; proactive toileting program, patient should not be unattended in bathroom      LETY (acute kidney injury) (HCC)  > Baseline Cr 0.9  > Recently 1.3 improved from before- peak 2.7  > Now s/p bicarb    - IVF per IM/Nephro  - Management at discretion of nephro   E. coli UTI  > Pet met sepsis criteria on 4/29 with confusion and agitation. UA was positive. Urine culture and blood cultures showed Ecoli sensitive to cephalosporins.  > ID consulted and pt completed 7d course of ceftriaxone  > Approved to restart chemo 5/2.    - Monitor for any recurrence of agitation or new fevers/signs of infection  Impaired mobility and activities of daily living  Patient was evaluated by the rehabilitation team MD and an  appropriate candidate for acute inpatient rehabilitation program at this time.  The patient will tolerate 3 hours/day 5 to 7 days/week of intensive physical, occupational in order to obtain goals for community discharge  Due to the patient's functional Compared to their baseline level of function in addition to their ongoing medical needs, the patient would benefit from daily supervision from a rehabilitation physician as well as rehabilitation nursing to implement and adjust the medical as well as functional plan of care in order to meet the patient's goals.  HTN (hypertension)  Had been on losartan but holding at this time was her blood pressures are good  Transaminitis  CMP checked on 5/9 and AST is down to 57 from 111 and ALT to 281 from 322  ALT and AST were elevated  Hyponatremia  Sodium improved from 132 up to 136 today on 5/12  Thrombocytopenia (HCC)  Platelets down to 115K will need to continue monitoring while on chemotherapy  Sepsis (HCC)    E coli bacteremia    Leukopenia    At risk for acid-base imbalance  Urine pH testing and sodium bicarb drip with MTX treatments  Electrolyte imbalance risk    Anemia  Most recent hemoglobin of 9.0 and stable    Subjective   Patient is a 64-year-old male with history of type 2 diabetes, GERD, hyperlipidemia and hypertension on nonalcoholic fatty liver disease and sleep apnea who presents to [facility] on 3/29/2025 for increasing confusion. He had been recently diagnosed with a brain mass and family reported that he had been having increased symptoms including forgetfulness over the past several months. Initial workup was negative however he was seen in the ER on 3/24 and a CT of the head was concerning for brain lesion. 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 Additional areas of enhancement in the basal ganglia on the left as well as the left cerebral peduncle/left quirino.  There was an initial plan to have an outpatient neurosurgery evaluation but because of his worsening symptoms including visual deficits he would came to the ER sooner. A CT of the chest abdomen pelvis was completed on 3/29 showing a right lower lobe pulmonary nodule and nonspecific hepatic lesion and MRI was completed revealing a simple right hepatic lobe cyst. A diagnostic lumbar puncture was completed on 3/31 suspicious for CNS lymphoma and a repeat CT completed on 4/3 showed worsening hydrocephalus and MRI on 4/11 showed disease progression. Biopsy was done and EVD placed on 4/14 and the patient self extubated postprocedure. The preliminary results from the biopsy were indicative of a small round blue cell tumor suggestive of non-Hodgkin's lymphoma. He was started on high-dose steroids, methotrexate and rituximab by oncology but did have a complicated course including LETY, intermittent agitated delirium requiring medications as well as continuous observation as well as an E. coli UTI with bacteremia status post course of antibiotics. The EVD was removed on 4/26.     Chief Complaint: f/u non-traumatic brain injury and f/u ambulatory dysfunction    Interval: Patient seen and evaluated in room without acute issues overnight.  Participating in therapy and has been doing well from a behavioral standpoint but still requiring assistance due to his impulsivity and lack of safety awareness and insight to his current deficits.  Has not been agitated.  Is getting his methotrexate treatments tomorrow and is on sodium bicarb drip for addressing the urine pH which is indicated in this protocol.    Objective :  Temp:  [97.5 °F (36.4 °C)-98.3 °F (36.8 °C)] 98.3 °F (36.8 °C)  HR:  [68-81] 78  BP: (109-115)/(69-72) 115/70  Resp:  [16-20] 18  SpO2:  [98 %] 98 %  O2 Device: None (Room air)    Functional Update:  Physical Therapy Occupational Therapy Speech Therapy   Weight Bearing Status: Full Weight Bearing  Transfers: Incidental  Touching  Bed Mobility: Supervision  Amulation Distance (ft): 150 feet  Ambulation: Minimal Assistance, Incidental Touching  Assistive Device for Ambulation: Other (none)  Number of Stairs: 12  Assistive Device for Stairs: Right Hand Rail  Stair Assistance: Incidental Touching  Ramp: Incidental Touching  Assistive Device for Ramp: None  Discharge Recommendations: Home with:  DC Home with:: 24 Hour Assisteance, Family Support, Home Physical Therapy   Eating:  (setup)  Grooming: Incidental Touching  Bathing: Minimal Assistance  Bathing: Minimal Assistance  Upper Body Dressing: Supervision  Lower Body Dressing: Minimal Assistance, Moderate Assistance  Toileting: Minimal Assistance  Toilet Transfer: Incidental Touching, Minimal Assistance  Cognition: Exceptions to WNL  Cognition: Decreased Memory, Behavioral Considerations, Impulsive, Decreased Executive Functions, Decreased Attention, Decreased Comprehension, Decreased Safety  Orientation: Person   Mode of Communication: Verbal  Speech/Language:  (word finding difficulty)  Cognition: Exceptions to WNL  Cognition: Decreased Memory, Decreased Executive Functions, Decreased Attention, Decreased Comprehension, Decreased Safety, Impulsive  Orientation: Person  Discharge Recommendations: Home with:  DC Home with:: 24 Hour Supervision, 24 Hour Assisteance, Family Support, Home Speech Therapy, Outpatient Speech Therapy (pending pt progress)       Physical Exam  Vitals reviewed.   Constitutional:       Appearance: Normal appearance. He is normal weight.   HENT:      Head: Normocephalic.      Comments: Anterior scalp biopsy site healing well     Right Ear: External ear normal.      Left Ear: External ear normal.      Nose: Nose normal. No rhinorrhea.      Mouth/Throat:      Mouth: Mucous membranes are moist.      Pharynx: Oropharynx is clear.     Eyes:      General: No scleral icterus.      Cardiovascular:      Rate and Rhythm: Normal rate.   Pulmonary:      Effort: Pulmonary  effort is normal. No respiratory distress.   Abdominal:      General: There is no distension.      Palpations: Abdomen is soft.     Musculoskeletal:      Right lower leg: No edema.      Left lower leg: No edema.     Skin:     General: Skin is warm and dry.     Neurological:      Mental Status: He is alert.      Motor: No weakness.      Comments: Oriented to self   Psychiatric:         Mood and Affect: Mood normal.         Behavior: Behavior normal.           Scheduled Meds:  Current Facility-Administered Medications   Medication Dose Route Frequency Provider Last Rate    acetaminophen  650 mg Oral Q6H PRN Crispin Arana MD      allopurinol  300 mg Oral Daily Crispin Arana MD      alteplase  2 mg Intracatheter Q1MIN PRN Crispin Arana MD      alteplase  2 mg Intracatheter Q1MIN PRN Magali Lee MD      aluminum-magnesium hydroxide-simethicone  30 mL Oral Q4H PRN Crispin Arana MD      atorvastatin  20 mg Oral Daily Crispin Arana MD      bisacodyl  10 mg Rectal Daily PRN Skyler Hendrickson MD      calcium carbonate  1,000 mg Oral Daily PRN Crispin Arana MD      chlorhexidine  15 mL Mouth/Throat Q12H [facility] Crispin Arana MD      dexamethasone  1 mg Oral Q12H [facility] Crispin Arana MD      docusate sodium  100 mg Oral BID Jessica Arreola, DO      heparin (porcine)  5,000 Units Subcutaneous Q8H CAIT Arana MD      insulin lispro  1-5 Units Subcutaneous TID AC PATI Porras      insulin lispro  1-5 Units Subcutaneous HS PATI Porras      insulin lispro  6 Units Subcutaneous TID With Meals PATI Porras      lactulose  20 g Oral Daily PRN Joshua Kaminski DO      melatonin  6 mg Oral HS Crispin Arana MD      metFORMIN  1,000 mg Oral Daily Crispin Arana MD      OLANZapine  5 mg Intramuscular BID PRN Crispin Arana MD      ondansetron  4 mg Intravenous Q6H PRN Crispin Arana MD      oxyCODONE  2.5 mg Oral Q4H PRN Crispin Arana MD      Or    oxyCODONE  5 mg Oral Q4H PRN  Crispin Arana MD      pantoprazole  40 mg Oral Early Morning Crispin Arana MD      polyethylene glycol  17 g Oral Daily PRN Skyler Hendrickson MD      QUEtiapine  12.5 mg Oral Daily Crispin Arana MD      QUEtiapine  50 mg Oral HS Crispin Arana MD      senna  2 tablet Oral Daily With Lunch Jessica Arreola DO      sitaGLIPtin  50 mg Oral Daily PATI Porras      sodium bicarbonate 150 mEq in sterile water 1,000 mL infusion   Intravenous Continuous Kuldip Andi Medina  mL/hr at 05/16/25 1018    sodium chloride  20 mL/hr Intravenous Once PRN Crispin Arana MD      sodium chloride  20 mL/hr Intravenous Once PRN Magali Lee MD           Lab Results: I have reviewed the following results:  Results from last 7 days   Lab Units 05/15/25  0600 05/12/25  0614   HEMOGLOBIN g/dL 9.0* 9.3*   HEMATOCRIT % 26.1* 26.4*   WBC Thousand/uL 3.35* 5.16   PLATELETS Thousands/uL 115* 117*     Results from last 7 days   Lab Units 05/16/25  0618 05/15/25  0600 05/12/25  0614 05/10/25  1023   BUN mg/dL 27* 27* 33* 34*   SODIUM mmol/L 135 136 136 132*   POTASSIUM mmol/L 4.2 3.9 4.3 4.4   CHLORIDE mmol/L 98 98 101 98   CREATININE mg/dL 1.19 1.25 1.11 1.15   AST U/L  --   --  14 19   ALT U/L  --   --  103* 187*                Joshua Kaminski,   Physical Medicine and Rehabilitation  [facility]    I have spent a total time of 35 minutes in caring for this patient on the day of the visit/encounter including Counseling / Coordination of care, Documenting in the medical record, Reviewing/placing orders in the medical record (including tests, medications, and/or procedures), and Communicating with other healthcare professionals .

## 2025-05-16 NOTE — ASSESSMENT & PLAN NOTE
Platelet count down to 117 from 160 on 5/12/25 and on 5/15/25  is 115  ?from chemo  Per H-O  CBC 5/19/25

## 2025-05-16 NOTE — ASSESSMENT & PLAN NOTE
Lab Results   Component Value Date    HGBA1C 6.6 (H) 02/22/2025       Recent Labs     05/15/25  1556 05/15/25  2113 05/16/25  0622 05/16/25  1008   POCGLU 161* 114 143* 161*       Blood Sugar Average: Last 72 hrs:  (P) [id number]    - Metformin 1000mg daily  - Lantus 10u daily with Lispro 8u TID and SSI  - Monitor accuchecks while on steroids  - Management per IM

## 2025-05-16 NOTE — PROGRESS NOTES
"OT daily tx note     05/16/25 1000   Pain Assessment   Pain Assessment Tool 0-10   Pain Score No Pain   Restrictions/Precautions   Precautions Bed/chair alarms;Cognitive;Fall Risk;Impulsive;Supervision on toilet/commode   Weight Bearing Restrictions No   ROM Restrictions No   Lifestyle   Autonomy \"I used to love the rain\"   Shower/Bathe Self   Type of Assistance Needed Incidental touching   Physical Assistance Level No physical assistance   Comment completed shower w/ covering fo LUE PICC line and POD #4 for head incision; pt able to wash 10/10 body parts and mod VC t/o shower session for initiation, sequencing, and safety as pt tends to stand unsupported when slippery and walk out of shower when still wet w/ minor LOB   Shower/Bathe Self CARE Score 4   Upper Body Dressing   Type of Assistance Needed Supervision   Physical Assistance Level No physical assistance   Comment seated w/ min VC   Upper Body Dressing CARE Score 4   Lower Body Dressing   Type of Assistance Needed Incidental touching   Physical Assistance Level No physical assistance   Comment seated and able to complete all steps w/ min VC and CGA for cm in stance   Lower Body Dressing CARE Score 4   Putting On/Taking Off Footwear   Type of Assistance Needed Supervision   Physical Assistance Level No physical assistance   Comment seated and utilizes crossed leg tech   Putting On/Taking Off Footwear CARE Score 4   Sit to Stand   Type of Assistance Needed Supervision   Physical Assistance Level No physical assistance   Comment w/o AD   Sit to Stand CARE Score 4   Bed-Chair Transfer   Type of Assistance Needed Supervision   Physical Assistance Level No physical assistance   Comment w/o AD; fluctuates min A-CS   Chair/Bed-to-Chair Transfer CARE Score 4   Cognition   Overall Cognitive Status Impaired   Arousal/Participation Responsive;Cooperative   Attention Attends with cues to redirect   Orientation Level Oriented to person   Memory Decreased recall of " biographical information;Decreased recall of recent events;Decreased long term memory;Decreased short term memory;Decreased recall of precautions   Following Commands Follows multistep commands inconsistently   Comments Pt engaged in functional cognition activity using pipe tree activity. Pt completed seated at tabletop and able to complete activity #1 w/ inc time. Min VC needed t/o task but allowed pt to problem solve on his own. Pt benefits from activity to improve functional cog including STM, working mem, attention, visual scanning to facilitate ind w/ insight into deficits and ADL/IADLs.   Activity Tolerance   Activity Tolerance Patient limited by fatigue   Assessment   Treatment Assessment Pt engaged in skilled OT session with focus on ADL Retraining , LB Dressing, UB dressing, Functional Cognition, Functional Attention, Standing tolerance, Standing balance , Energy conservation training/education, healthy coping education, Leisure and social pursuits, and community re-integration. Pt is limited by weakness, impaired balance, decreased endurance, increased fall risk, decreased ADLS, decreased IADLS, decreased activity tolerance, decreased safety awareness, impaired judgement, and decreased cognition. ADL routine completed during session w/ min-mod VC t/o session. Pt will recive FT tmrw w/ wife Naldo, see below for details. Pt benefits from daily ADLs as well as endurance training, standing balance/delfin activities, UB strengthening, functional cog, cog reorientation using calendar/memory book. OT services are warranted to address above barriers.    FT w/ wife, Naldo:  - pt needs more VC t/o session compared to physical assistance. Try to encourage wife to be as hands-on as possible t/o session   - pt can complete shower w/ SUP and CGA needed for shower transfer. VC for safety, sequencing, and initiation  - coverings needed for LUE PICC line and POD #4 for head incision  - rec RW and/or no AD for home - up to wife  to decide what she thinks is best for pt but RW can become tripping hazard at times as pt demo poor insight/safety awareness  - pt fluctuates from CGA-CS for ADLs and TA for IADLs (VC as needed due to imp cog)  - pt cont to be incontinent and difficulty w/ brief mgmt - maybe suggest to wife to purchase pull-up briefs for easier use  - may need to order BSC for home ** (may need additional AE i.e TTB / shower chair)   Prognosis Fair   Problem List Decreased strength;Decreased range of motion;Decreased endurance;Impaired balance;Decreased coordination;Decreased mobility;Decreased cognition;Impaired judgement;Decreased safety awareness   Barriers to Discharge Inaccessible home environment;Decreased caregiver support   Plan   Treatment/Interventions ADL retraining;Functional transfer training;Therapeutic exercise;Endurance training;Cognitive reorientation;Patient/family training   Progress Progressing toward goals   OT Therapy Minutes   OT Time In 1000   OT Time Out 1130   OT Total Time (minutes) 90   OT Mode of treatment - Individual (minutes) 0   OT Mode of treatment - Concurrent (minutes) 0   OT Mode of treatment - Group (minutes) 0   OT Mode of treatment - Co-treat (minutes) 0   OT Mode of Treatment - Total time(minutes) 0 minutes   OT Cumulative Minutes 615   Therapy Time missed   Time missed? No

## 2025-05-16 NOTE — ASSESSMENT & PLAN NOTE
Hemoglobin A1C was 6.6 on 2/22/25  Sees David Pagan [facility] in [place]  Home:  Janumet XR  qd  Here: Lispro 6 U TID/Metformin XR 1000 mg qd/Januvia 50mg qd  Restarted Januvia 50 mg qd on 5/14/25 and stopped the Lantus 5/13/25.  Kept Lispro WM same.  Continue DM diet  On QID Accuchecks with SSI Algo 4 = will change to Algo 1  BSs should continue to improve with steroid being tapered (LD will be 5/19)  To have BMP qd x 3 and Metformin may need to be held if creatinine is rising +/- adjust Januvia  No changes today 5/16/25

## 2025-05-16 NOTE — CONSULTS
This is a reconsult.   Please see original consult done by Ann Robledo on 4/21/25.   Please see my progress note from today.

## 2025-05-16 NOTE — PROGRESS NOTES
"Progress Note - Hospitalist   Name: Alexis Dennison 64 y.o. male I MRN: [id number]  Unit/Bed#: -01 I Date of Admission: 5/7/2025   Date of Service: 5/16/2025 I Hospital Day: 9    Assessment & Plan  Primary central nervous system (CNS) lymphoma  Patient with development of worsening confusion, and was seen in the ED on 3/24/2025.  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  Lymphoma/leukemia panel was nondiagnostic  CTH 4/3/25: concerning for worsening hydrocephalus.   Repeat LP 4/7/25 = undiagnostic again   MRI brain 4/11/25: \"Interval enlargement of the dominant left anterior basal ganglionic mass lesion with greater surrounding vasogenic edema and mass effect. 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  S/p EVD, self removed 4/26  S/p Keppra 500mg BID x 7 days for postoperative seizure ppx   Started on chemotherapy during hospital stay and is for weekly Rituximab and Methotrexate every 2 weeks  Continue Dexamethasone taper = LD will be 5/19/25  Maintain PICC line  H-O following  Had Rituxan 5/10/25  Due for Methotrexate on 5/17.  They saw his labs from 5/15/25. They ordered a  urine pH for 5/16; goal is less than 7 so renal started a Bicarb drip and will continue to monitor him for this = consult placed  Type 2 diabetes mellitus with hyperglycemia, without long-term current use of insulin (HCC)  Hemoglobin A1C was 6.6 on 2/22/25  Sees [facility] in " "[place]  Home:  Janumet XR  qd  Here: Lispro 6 U TID/Metformin XR 1000 mg qd/Januvia 50mg qd  Restarted Januvia 50 mg qd on 5/14/25 and stopped the Lantus 5/13/25.  Kept Lispro WM same.  Continue DM diet  On QID Accuchecks with SSI Algo 4 = will change to Algo 1  BSs should continue to improve with steroid being tapered (LD will be 5/19)  To have BMP qd x 3 and Metformin may need to be held if creatinine is rising +/- adjust Januvia  No changes today 5/16/25  Mixed hyperlipidemia  Continue atorvastatin  Thyroid nodule  TSH/free T4 4/21/25 = 0.019/1.13  Nonemergent outpatient endocrine follow-up.   Will require outpatient TSH, free T4, +/- further imaging with endocrine such as radioactive uptake  Repeat TSH/free T4 added on to 5/12's labs = TSH was 0.287 with free T4 0.85  Pulmonary nodule  CT chest 3 months follow-up  Liver lesion  Patient will require outpatient follow-up  Encephalopathy  Improving  Continue Seroquel  Continue steroid taper  On 1:1  LETY (acute kidney injury) (HCC)  Monitor status post methotrexate  Peak creat was 2.77 on 4/22/25  Previous baseline as OP 5/2023-2/22/25 was 1.1 to 1.0  CMP 5/9/25 with creatinine of 1.18 down from 1.23 on 5/8 and on 5/10 was 1.15  CMP on 5/12/25 showed creatinine stable at 1.1  On 5/15, creat is up to 1.25 but down to 1.19  Will need to watch and repeat BMP qd x 3 = if creat up more, consult Nephrology and may need to stop Metformin +/- adjust Januvia dose  Sepsis (HCC)  Resolved  Continue to monitor off antibiotics  E coli bacteremia  Patient completed 7 days of IV cefazolin which was finished on May 5  Was due to E. Coli UTI  HTN (hypertension)  Home:  Losartan -25 qd  Here:  no meds for now since he is normotensive  Transaminitis  AST is 111, previously 114,  ALT is 322 previously 149  D/w H-O, they are aware =  \"Could be due to recent chemotherapy versus antibiotics versus liver lesions\".    CMP 5/9/25 = AST 57 (previously 111),  " (previously 322)  Almost totally resolved now with AST 57 and   Leukopenia  WBC was stable at 5.9 on 5/12/25 but today 5/15/25 is down to 3.3  H-O following  CBC 5/19/25  Hyponatremia  Mild   resolved  Thrombocytopenia (HCC)  Platelet count down to 117 from 160 on 5/12/25 and on 5/15/25  is 115  ?from chemo  Per H-O  CBC 5/19/25  Anemia  Hemoglobin stable at 9.0  CBC 5/19/25    The above assessment and plan was reviewed and updated as determined by my evaluation of the patient on 5/16/2025.    History of Present Illness   Patient seen and examined. Patients overnight issues or events were reviewed with nursing staff. New or overnight issues include the following:   No new or overnight issues.  Offers no complaints    Review of Systems   All other systems reviewed and are negative.      Objective :  Temp:  [97.5 °F (36.4 °C)-98.3 °F (36.8 °C)] 98.3 °F (36.8 °C)  HR:  [68-81] 78  BP: (109-115)/(69-72) 115/70  Resp:  [16-20] 18  SpO2:  [98 %] 98 %  O2 Device: None (Room air)    Invasive Devices       Peripherally Inserted Central Catheter Line  Duration             PICC Line 05/01/25 Left Brachial 14 days              Drain  Duration             External Urinary Catheter 13 days                    Physical Exam  General Appearance: no distress, nontoxic appearing  HEENT:  External ear normal.  Nose normal w/o drainage. Mucous membranes are moist. Oropharynx is clear. Conjunctiva clear w/o icterus or redness.  Neck:  Supple, normal ROM  Lungs: BBS without crackles/wheeze/rhonchi; respirations unlabored with normal inspiratory/expiratory effort.  No retractions noted.  On RA  CV: regular rate and rhythm; no rubs/murmurs/gallops, PMI normal   ABD: Abdomen is soft.  Bowel sounds all quadrants.  Nontender with no distention.    EXT: no edema  Skin: normal turgor, normal texture  Psych: affect flat, mood subdued  Neuro: AA       The above physical exam was reviewed and updated as determined by my evaluation of the  patient on 5/16/2025.      Lab Results: I have reviewed the following results:  Results from last 7 days   Lab Units 05/15/25  0600 05/12/25  0614   WBC Thousand/uL 3.35* 5.16   HEMOGLOBIN g/dL 9.0* 9.3*   HEMATOCRIT % 26.1* 26.4*   PLATELETS Thousands/uL 115* 117*     Results from last 7 days   Lab Units 05/16/25  0618 05/15/25  0600   SODIUM mmol/L 135 136   POTASSIUM mmol/L 4.2 3.9   CHLORIDE mmol/L 98 98   CO2 mmol/L 29 29   BUN mg/dL 27* 27*   CREATININE mg/dL 1.19 1.25   CALCIUM mg/dL 8.8 8.5             Results from last 7 days   Lab Units 05/16/25  1008 05/16/25  0622 05/15/25  2113   POC GLUCOSE mg/dl 161* 143* 114       Imaging Results Review: No pertinent imaging studies reviewed.  Other Study Results Review: No additional pertinent studies reviewed.    Review of Scheduled Meds: Medications  reviewed and reconciled as needed  Current Facility-Administered Medications   Medication Dose Route Frequency Provider Last Rate    acetaminophen  650 mg Oral Q6H PRN Crispin Arana MD      allopurinol  300 mg Oral Daily Crispin Arana MD      alteplase  2 mg Intracatheter Q1MIN PRN Crispin Arana MD      alteplase  2 mg Intracatheter Q1MIN PRN Magali Lee MD      aluminum-magnesium hydroxide-simethicone  30 mL Oral Q4H PRN Crispin Arana MD      atorvastatin  20 mg Oral Daily Crispin Arana MD      bisacodyl  10 mg Rectal Daily PRN Skyler Hendrickson MD      calcium carbonate  1,000 mg Oral Daily PRN Crispin Arana MD      chlorhexidine  15 mL Mouth/Throat Q12H [facility] Crispin Arana MD      dexamethasone  1 mg Oral Q12H [facility] Crispin Arana MD      docusate sodium  100 mg Oral BID Jessica Arreola DO      heparin (porcine)  5,000 Units Subcutaneous Q8H [facility] Crispin Arana MD      insulin lispro  2-12 Units Subcutaneous 4x Daily (AC & HS) Crispin Arana MD      insulin lispro  6 Units Subcutaneous TID With Meals PATI Porras      lactulose  20 g Oral Daily PRN Joshua Kaminski DO      melatonin  6 mg Oral  HS Crispin Arana MD      metFORMIN  1,000 mg Oral Daily Crispin Arana MD      OLANZapine  5 mg Intramuscular BID PRN Crispin Arana MD      ondansetron  4 mg Intravenous Q6H PRN Crispin Arana MD      oxyCODONE  2.5 mg Oral Q4H PRN Crispin Arana MD      Or    oxyCODONE  5 mg Oral Q4H PRN Crispin Arana MD      pantoprazole  40 mg Oral Early Morning Crispin Arana MD      polyethylene glycol  17 g Oral Daily PRN Skyler Hendrickson MD      QUEtiapine  12.5 mg Oral Daily Crispin Arana MD      QUEtiapine  50 mg Oral HS Crispin Arana MD      senna  2 tablet Oral Daily With Lunch Jessica Arreola DO      sitaGLIPtin  50 mg Oral Daily PATI Porras      sodium bicarbonate 150 mEq in sterile water 1,000 mL infusion   Intravenous Continuous Kuldip Andi Medina  mL/hr at 05/16/25 1018    sodium chloride  20 mL/hr Intravenous Once PRN Crispin Arana MD      sodium chloride  20 mL/hr Intravenous Once PRN Magali Lee MD         VTE Pharmacologic Prophylaxis: HSQ  Code Status: Level 1 - Full Code  Current Length of Stay: 9 day(s)    Administrative Statements     ** Please Note:  voice to text software may have been used in the creation of this document. Although proof errors in transcription or interpretation are a potential of such software**

## 2025-05-16 NOTE — PLAN OF CARE
Problem: SAFETY ADULT  Goal: Patient will remain free of falls  Description: INTERVENTIONS:- Educate patient/family on patient safety including physical limitations- Instruct patient to call for assistance with activity - Consult OT/PT to assist with strengthening/mobility - Keep Call bell within reach- Keep bed low and locked with side rails adjusted as appropriate- Keep care items and personal belongings within reach- Initiate and maintain comfort rounds- Make Fall Risk Sign visible to staff- Offer Toileting every 2 Hours, in advance of need- Initiate/Maintain bed/chair alarm- Obtain necessary fall risk management equipment: non skid wear- Apply yellow socks and bracelet for high fall risk patients- Consider moving patient to room near nurses station  Outcome: Progressing  Goal: Maintain or return to baseline ADL function  Description: INTERVENTIONS:-  Assess patient's ability to carry out ADLs; assess patient's baseline for ADL function and identify physical deficits which impact ability to perform ADLs (bathing, care of mouth/teeth, toileting, grooming, dressing, etc.)- Assess/evaluate cause of self-care deficits - Assess range of motion- Assess patient's mobility; develop plan if impaired- Assess patient's need for assistive devices and provide as appropriate- Encourage maximum independence but intervene and supervise when necessary- Involve family in performance of ADLs- Assess for home care needs following discharge - Consider OT consult to assist with ADL evaluation and planning for discharge- Provide patient education as appropriate  Outcome: Progressing  Goal: Maintains/Returns to pre admission functional level  Description: INTERVENTIONS:- Perform AM-PAC 6 Click Basic Mobility/ Daily Activity assessment daily.- Set and communicate daily mobility goal to care team and patient/family/caregiver. - Collaborate with rehabilitation services on mobility goals if consulted- Perform Range of Motion 3 times a  day.- Reposition patient every 2 hours.- Dangle patient 3 times a day- Stand patient 3 times a day- Ambulate patient 3 times a day- Out of bed to chair 3 times a day - Out of bed for meals 3 times a day- Out of bed for toileting- Record patient progress and toleration of activity level   Outcome: Progressing

## 2025-05-16 NOTE — ASSESSMENT & PLAN NOTE
"Patient with development of worsening confusion, and was seen in the ED on 3/24/2025.  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  Lymphoma/leukemia panel was nondiagnostic  CTH 4/3/25: concerning for worsening hydrocephalus.   Repeat LP 4/7/25 = undiagnostic again   MRI brain 4/11/25: \"Interval enlargement of the dominant left anterior basal ganglionic mass lesion with greater surrounding vasogenic edema and mass effect. 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  S/p EVD, self removed 4/26  S/p Keppra 500mg BID x 7 days for postoperative seizure ppx   Started on chemotherapy during hospital stay and is for weekly Rituximab and Methotrexate every 2 weeks  Continue Dexamethasone taper = LD will be 5/19/25  Maintain PICC line  H-O following  Had Rituxan 5/10/25  Due for Methotrexate on 5/17.  They saw his labs from 5/15/25. They ordered a  urine pH for 5/16; goal is less than 7 so renal started a Bicarb drip and will continue to monitor him for this = consult placed  "

## 2025-05-16 NOTE — PROGRESS NOTES
05/16/25 1400   Pain Assessment   Pain Assessment Tool 0-10   Pain Score No Pain   Restrictions/Precautions   Precautions 1:1;Bed/chair alarms;Cognitive;Fall Risk;Multiple lines;Supervision on toilet/commode   Weight Bearing Restrictions No   ROM Restrictions No   Comprehension   Comprehension (FIM) 3 - Needs parts of sentences repeated   Expression   Expression (FIM) 3 - Expresses basic info/needs 50-74% of time   Social Interaction   Social Interaction (FIM) 5 - Interacts appropriately with others 90% of time   Problem Solving   Problem solving (FIM) 2 - Solves basic problems 25-49% of time   Memory   Memory (FIM) 1 - Recognizes, recalls/performs less than 25%   Speech/Language/Cognition Assessmetn   Treatment Assessment Pt seen for skilled speech therapy session targeting cognitive linguistic communication skills. Pt alert and interactive- reports no pain or issues. Pt did request toileting- needing prompting for sequencing and line management with IV.  Pt at first insisting to walk into the bathroom, however pt agreeable to use of urnial in standing for line management purposes. Once completed, laid back in bed and participated in cognitive tasks.     Functional problem solving with ID good/bad solutions. Pt was given situation with proposed solution, pt was able to determine if situation was good/bad with 6/12acc increasing with verbal cues and repetition as well as further context to situation for him to recognize. Pt then also was encouraged to elaborate on his responses which noted he had increased difficulty with thought organization and ability to provide his reasoning.     Pt overall is improving with skilled SLP services and will cont to benefit to maximize overall cognitive linguistic communication abilities at this time.    SLP Therapy Minutes   SLP Time In 1400   SLP Time Out 1430   SLP Total Time (minutes) 30   SLP Mode of treatment - Individual (minutes) 30   SLP Mode of treatment - Concurrent  (minutes) 0   SLP Mode of treatment - Group (minutes) 0   SLP Mode of treatment - Co-treat (minutes) 0   SLP Mode of Treatment - Total time(minutes) 30 minutes   SLP Cumulative Minutes 420   Therapy Time missed   Time missed? No

## 2025-05-16 NOTE — PROGRESS NOTES
"   05/16/25 7850   Pain Assessment   Pain Assessment Tool 0-10   Pain Score No Pain   Restrictions/Precautions   Precautions 1:1;Bed/chair alarms;Cognitive;Fall Risk;Limb alert;Multiple lines;Supervision on toilet/commode;Impulsive   Weight Bearing Restrictions No   ROM Restrictions No   General   Change In Medical/Functional Status IV in LUE   Cognition   Overall Cognitive Status Impaired   Arousal/Participation Responsive;Cooperative   Attention Attends with cues to redirect   Orientation Level Oriented to person   Memory Decreased recall of biographical information;Decreased recall of recent events;Decreased long term memory;Decreased short term memory;Decreased recall of precautions   Following Commands Follows one step commands with increased time or repetition   Subjective   Subjective \"I have an IV in?   Roll Left and Right   Type of Assistance Needed Set-up / clean-up   Roll Left and Right CARE Score 5   Sit to Lying   Type of Assistance Needed Supervision   Sit to Lying CARE Score 4   Lying to Sitting on Side of Bed   Type of Assistance Needed Supervision   Lying to Sitting on Side of Bed CARE Score 4   Sit to Stand   Type of Assistance Needed Supervision   Sit to Stand CARE Score 4   Bed-Chair Transfer   Type of Assistance Needed Supervision;Adaptive equipment   Comment w and w/o AD    Chair/Bed-to-Chair Transfer CARE Score 4   Transfer Bed/Chair/Wheelchair   Limitations Noted In Balance;LE Strength;Sequencing;Problem Solving   Adaptive Equipment Rollator   Car Transfer   Type of Assistance Needed Supervision   Comment  w rollator   Car Transfer CARE Score 4   Walk 10 Feet   Type of Assistance Needed Supervision;Adaptive equipment   Comment  w rollator   Walk 10 Feet CARE Score 4   Walk 50 Feet with Two Turns   Type of Assistance Needed Supervision;Adaptive equipment   Comment  w rollator   Walk 50 Feet with Two Turns CARE Score 4   Walk 150 Feet   Type of Assistance Needed Supervision   Comment CS " "w rollator   Walk 150 Feet CARE Score 4   Walking 10 Feet on Uneven Surfaces   Type of Assistance Needed Incidental touching;Adaptive equipment   Comment rollator   Walking 10 Feet on Uneven Surfaces CARE Score 4   Ambulation   Primary Mode of Locomotion Prior to Admission Walk   Distance Walked (feet) 150 ft  (x4)   Assist Device Rollator   Gait Pattern Inconsistant Vicki;Slow Vicki;Step through;Improper weight shift   Limitations Noted In Balance;Endurance;Sequencing;Speed;Strength;Swing   Does the patient walk? 2. Yes   Wheel 50 Feet with Two Turns   Reason if not Attempted Activity not applicable   Wheel 50 Feet with Two Turns CARE Score 9   Wheel 150 Feet   Reason if not Attempted Activity not applicable   Wheel 150 Feet CARE Score 9   Wheelchair mobility   Does the patient use a wheelchair? 0. No   Curb or Single Stair   Style negotiated Curb  (6\")   Type of Assistance Needed Physical assistance;Adaptive equipment   Physical Assistance Level 25% or less   Comment Alcides for balance, maxA VC for sequencing, pt often needing redirection to follow commands. he attempted to step down from curb with rollator still on curb after direction was given to place rollator on ground.   1 Step (Curb) CARE Score 3   4 Steps   Type of Assistance Needed Incidental touching   4 Steps CARE Score 4   12 Steps   Type of Assistance Needed Incidental touching   12 Steps CARE Score 4   Stairs   Type Stairs   # of Steps 12   Weight Bearing Precautions Fall Risk   Assist Devices Single Rail   Findings pt asc/desc with varying reciprocal/non reciprocal pattern wtih 1 UE support on LHR, pt needed max VC for sequencing as he had IV in and was unable to consistently follow commands to turn to avoid tangling IV from IV pole   Picking Up Object   Type of Assistance Needed Supervision;Adaptive equipment   Comment CS w reacher   Picking Up Object CARE Score 4   Other Comments   Comments At end of session 1:1 was in room with patient. "   Assessment   Treatment Assessment Pt particpated in skilled PT session with emphasis on bed mobility training, transfer training, ambulation training, stair negotiation training and curb negotiation training. Pt is still limited by his inconsistency in terms of assistance required along with needing maxA VC for for sequencing and positioning. During todays session we practiced with the rollator to assess balance and safety to be able to make recommendations to family during family training tomorrow. Overall, pt is balanced with rollator, however when needing to manage breaks prior to sitting on the rollator, walking, or sitting in a standard chair, pt could not remember where the breaks were, how to put them on and off, and to remember safety wise to utilize them. At this time, recommending pt be supervised and phys assisted with different aspects of mobility with and without use of AD. Continue per PT POC to improve LE TE, standing/ambulatory balance, and overall endurance to dec fall risk and improve functional mobility.   Problem List Decreased strength;Decreased range of motion;Decreased endurance;Impaired balance;Decreased coordination;Decreased mobility;Decreased cognition;Impaired judgement;Decreased safety awareness   Barriers to Discharge Inaccessible home environment;Decreased caregiver support   PT Barriers   Physical Impairment Decreased strength;Decreased range of motion;Decreased endurance;Impaired balance;Decreased mobility;Decreased safety awareness;Impaired judgement;Decreased cognition   Functional Limitation Car transfers;Stair negotiation;Standing;Transfers;Walking   Plan   Treatment/Interventions ADL retraining;Functional transfer training;LE strengthening/ROM;Elevations;Therapeutic exercise;Endurance training;Cognitive reorientation;Patient/family training;Equipment eval/education;Bed mobility;Gait training;Compensatory technique education   Progress Progressing toward goals   PT Therapy  Minutes   PT Time In 1440   PT Time Out 1540   PT Total Time (minutes) 60   PT Mode of treatment - Individual (minutes) 60   PT Mode of treatment - Concurrent (minutes) 0   PT Mode of treatment - Group (minutes) 0   PT Mode of treatment - Co-treat (minutes) 0   PT Mode of Treatment - Total time(minutes) 60 minutes   PT Cumulative Minutes 566   Therapy Time missed   Time missed? No

## 2025-05-16 NOTE — ASSESSMENT & PLAN NOTE
64 yoM with PMHx of DM2, NAFLD, and HT who presented with progressive encephalopathy found to have multiple scattered nodular cerebral and cerebellar enhancement who underwent two non-diagnostic LPs prompting stereotactic brain biopsy (4/14) which showed large B-cell lymphoma. See oncology progress note on 4/21 for full diagnostic work up. On 4/29, agitation led to infectious work up with showed UA+ and BCx with 2/2 E. Coli sensitive to ceftriaxone. ID was consulted is managing and has approved to restart chemo 5/2.      Treatment Plan: regimen modified from (https://pubmed.ncbi.nlm.nih.gov/37820145/)     High-dose methotrexate every 2 weeks x4 cycles followed by every 4 weeks maintenance (could consider dosing every 4 weeks if he discharges to outside rehab)  C1 (4/17) 8 g/m²  C2 (5/2) 3 g/m², dose-reduced due to ELTY, delayed x1 day due to E. coli bacteremia   Urine pH remained above 7.0.  Methotrexate level 24 hours post high-dose methotrexate, from 5/3/2025 was less than 0.10. Repeat levels on 5/5/25 at 0.36. Leucovorin, bicarb gtt was restarted.  Methotrexate level from this morning less than 0.10, no need to further monitor methotrexate level or urine pH.  Status post Rituxan on 5/8/2025.  Patient is due for his cycle 3 on 5/17/2025 with high-dose methotrexate.  Urine pH was 6.5 this morning, bicarb gtt. ordered by nephrology, input highly appreciated.  Monitor CBC, CMP and UA in a.m.

## 2025-05-16 NOTE — CASE MANAGEMENT
Received fax from [facility] declining clinical update as pt is covered through 5/17. Clinical due 5/17 or dc confirmation onf 5/18.  Cm phoned [facility] and after a wait time of 24 min yassine s/w aniya and received approval to fax clinical in on Monday as yassine does not work on the weekends.

## 2025-05-16 NOTE — ASSESSMENT & PLAN NOTE
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

## 2025-05-16 NOTE — ASSESSMENT & PLAN NOTE
Monitor status post methotrexate  Peak creat was 2.77 on 4/22/25  Previous baseline as OP 5/2023-2/22/25 was 1.1 to 1.0  CMP 5/9/25 with creatinine of 1.18 down from 1.23 on 5/8 and on 5/10 was 1.15  CMP on 5/12/25 showed creatinine stable at 1.1  On 5/15, creat is up to 1.25 but down to 1.19  Will need to watch and repeat BMP qd x 3 = if creat up more, consult Nephrology and may need to stop Metformin +/- adjust Januvia dose

## 2025-05-16 NOTE — PROGRESS NOTES
Progress Note - Oncology-Medical   Name: Alexis Dennison 64 y.o. male I MRN: [id number]  Unit/Bed#: -01 I Date of Admission: 5/7/2025   Date of Service: 5/16/2025 I Hospital Day: 9     Assessment & Plan  Primary central nervous system (CNS) lymphoma  64 yoM with PMHx of DM2, NAFLD, and HT who presented with progressive encephalopathy found to have multiple scattered nodular cerebral and cerebellar enhancement who underwent two non-diagnostic LPs prompting stereotactic brain biopsy (4/14) which showed large B-cell lymphoma. See oncology progress note on 4/21 for full diagnostic work up. On 4/29, agitation led to infectious work up with showed UA+ and BCx with 2/2 E. Coli sensitive to ceftriaxone. ID was consulted is managing and has approved to restart chemo 5/2.      Treatment Plan: regimen modified from (https://pubmed.ncbi.nlm.nih.gov/16526857/)     High-dose methotrexate every 2 weeks x4 cycles followed by every 4 weeks maintenance (could consider dosing every 4 weeks if he discharges to outside rehab)  C1 (4/17) 8 g/m²  C2 (5/2) 3 g/m², dose-reduced due to LETY, delayed x1 day due to E. coli bacteremia   Urine pH remained above 7.0.  Methotrexate level 24 hours post high-dose methotrexate, from 5/3/2025 was less than 0.10. Repeat levels on 5/5/25 at 0.36. Leucovorin, bicarb gtt was restarted.  Methotrexate level from this morning less than 0.10, no need to further monitor methotrexate level or urine pH.  Status post Rituxan on 5/8/2025.  Patient is due for his cycle 3 on 5/17/2025 with high-dose methotrexate.  Urine pH was 6.5 this morning, bicarb gtt. ordered by nephrology, input highly appreciated.  Monitor CBC, CMP and UA in a.m.  Thyroid nodule  Patient incidentally found to have thyroid nodule, was recommended ultrasound thyroid.  Pulmonary nodule  CTA from 3/29/2025 with nonspecific 4 mm right lower lobe pulmonary nodule, recommended 3 months follow-up.  Transaminitis  Patient noted with transaminitis,  LFTs has been fluctuating, more recently this morning noted , .  Could be due to recent chemotherapy versus antibiotics versus liver lesions.  Repeat CMP showed improving AST now within normal limit, ALT trended down from 322 to 187.  Hyponatremia  Nephrology on board  Thrombocytopenia (HCC)  Patient has noted to have fluctuating thrombocytopenia, however platelet counts remain above 100 K.  Will monitor CBC in AM.  No concerns for spontaneous bleeding or excessive bruising.  Anemia  Likely multifactorial, hemoglobin has been in range of 9.  Will continue to monitor.      Outpatient follow up plan: Patient has outpatient appointment with Dr. Chi    Communication with patient/family:  I did update the patient, have discussed in depth with patient's daughter Elizabeth over phone yesterday, family is aware of chemotherapy scheduled for Saturday and outpatient appointments.    Communication with team:  Did communicate with , primary team. I also spoke with nephrology regarding urine pH      I did review this patient with Dr. Kebede and they are in agreement with this plan.          Subjective:  Patient seen at bedside, no active complaints to offer.  Still continues to be on one-to-one.  Has been participating in physical therapy very well, per discussion with rehab team he is scheduled for discharge on 4/22/2025.    Review of Systems   Constitutional:  Negative for appetite change and unexpected weight change.   Respiratory:  Negative for chest tightness and shortness of breath.    Cardiovascular:  Negative for chest pain and palpitations.   Gastrointestinal:  Negative for abdominal pain and blood in stool.   Musculoskeletal:  Negative for back pain and myalgias.   All other systems reviewed and are negative.         Objective:     Medication Administration - last 24 hours from 05/15/2025 1412 to 05/16/2025 1412         Date/Time Order Dose Route Action Action by     05/16/2025 0800 EDT atorvastatin  (LIPITOR) tablet 20 mg 20 mg Oral Given Johanna Schmid, RN     05/16/2025 0800 EDT chlorhexidine (PERIDEX) 0.12 % oral rinse 15 mL 15 mL Mouth/Throat Given Johanna Chingva, RN     05/15/2025 2130 EDT chlorhexidine (PERIDEX) 0.12 % oral rinse 15 mL 15 mL Mouth/Throat Given Janefrances Ekuma, RN     05/16/2025 0620 EDT pantoprazole (PROTONIX) EC tablet 40 mg 40 mg Oral Given [facility]     05/16/2025 0844 EDT oxyCODONE (ROXICODONE) split tablet 2.5 mg 2.5 mg Oral Given [facility] Chingva, RN     05/16/2025 0844 EDT oxyCODONE (ROXICODONE) IR tablet 5 mg -- Oral See Alternative Johanna Chingva, RN     05/16/2025 0844 EDT acetaminophen (TYLENOL) tablet 650 mg 650 mg Oral Given [facility] Curtisarova, RN     05/16/2025 0800 EDT allopurinol (ZYLOPRIM) tablet 300 mg 300 mg Oral Given [facility] Chingva, RN     05/16/2025 1404 EDT heparin (porcine) subcutaneous injection 5,000 Units 5,000 Units Subcutaneous Given Johanna Chingva, RN     05/16/2025 0620 EDT heparin (porcine) subcutaneous injection 5,000 Units 5,000 Units Subcutaneous Given Janefrances Ekuma, RN     05/15/2025 2129 EDT heparin (porcine) subcutaneous injection 5,000 Units 5,000 Units Subcutaneous Given Janefrances Ekuma, RN     05/15/2025 1440 EDT heparin (porcine) subcutaneous injection 5,000 Units 5,000 Units Subcutaneous Given Heather Hines, RN     05/15/2025 2128 EDT melatonin tablet 6 mg 6 mg Oral Given [facility], RN     05/16/2025 1124 EDT QUEtiapine (SEROquel) tablet 12.5 mg 12.5 mg Oral Given [facility] Binu RN     05/15/2025 2128 EDT QUEtiapine (SEROquel) tablet 50 mg 50 mg Oral Given Yo Manning RN     05/16/2025 0656 EDT insulin lispro (HumALOG/ADMELOG) 100 units/mL subcutaneous injection 2-12 Units -- Subcutaneous Not Given Johanna Schmid RN     05/15/2025 2130 EDT insulin lispro (HumALOG/ADMELOG) 100 units/mL subcutaneous injection 2-12 Units -- Subcutaneous Not Given Yo  "Nigel, ADELINE     05/15/2025 1659 EDT insulin lispro (HumALOG/ADMELOG) 100 units/mL subcutaneous injection 2-12 Units 2 Units Subcutaneous Given Heather Hines RN     05/16/2025 0800 EDT metFORMIN (GLUCOPHAGE-XR) 24 hr tablet 1,000 mg 1,000 mg Oral Given Johanna Schmid RN     05/16/2025 0800 EDT dexamethasone (DECADRON) tablet 1 mg 1 mg Oral Given Johanna Schmid RN     05/15/2025 2129 EDT dexamethasone (DECADRON) tablet 1 mg 1 mg Oral Given Yo Manning RN     05/16/2025 0800 EDT docusate sodium (COLACE) capsule 100 mg 100 mg Oral Given Johanna Schmid RN     05/15/2025 1701 EDT docusate sodium (COLACE) capsule 100 mg 100 mg Oral Given Heather Hines RN     05/16/2025 1124 EDT senna (SENOKOT) tablet 17.2 mg 17.2 mg Oral Given Johanna Schmid RN     05/16/2025 1126 EDT insulin lispro (HumALOG/ADMELOG) 100 units/mL subcutaneous injection 6 Units 6 Units Subcutaneous Given Johanna Schmid RN     05/16/2025 0758 EDT insulin lispro (HumALOG/ADMELOG) 100 units/mL subcutaneous injection 6 Units 6 Units Subcutaneous Given Johanna Schmid RN     05/15/2025 1700 EDT insulin lispro (HumALOG/ADMELOG) 100 units/mL subcutaneous injection 6 Units 6 Units Subcutaneous Given Heather Hines RN     05/16/2025 0800 EDT sitaGLIPtin (JANUVIA) tablet 50 mg 50 mg Oral Given Johanna Schmid RN     05/16/2025 1018 EDT sodium bicarbonate 150 mEq in sterile water 1,000 mL infusion -- Intravenous New Bag Johanna Schmid RN     05/16/2025 1126 EDT insulin lispro (HumALOG/ADMELOG) 100 units/mL subcutaneous injection 1-5 Units 1 Units Subcutaneous Given Johanna Schmid RN            /68 (BP Location: Right arm)   Pulse 73   Temp 97.6 °F (36.4 °C) (Oral)   Resp 18   Ht 5' 10\" (1.778 m)   Wt 73.6 kg (162 lb 4.8 oz)   SpO2 97%   BMI 23.29 kg/m²       Physical Exam  Constitutional:       Appearance: Normal appearance.     Cardiovascular:      Rate and Rhythm: Normal rate " and regular rhythm.      Pulses: Normal pulses.   Pulmonary:      Effort: Pulmonary effort is normal.      Breath sounds: Normal breath sounds.   Abdominal:      General: Abdomen is flat.      Palpations: Abdomen is soft.     Skin:     General: Skin is warm and dry.     Neurological:      Mental Status: He is alert.           Recent Results (from the past 48 hours)   Fingerstick Glucose (POCT)    Collection Time: 05/14/25  3:40 PM   Result Value Ref Range    POC Glucose 118 65 - 140 mg/dl   Fingerstick Glucose (POCT)    Collection Time: 05/14/25  8:58 PM   Result Value Ref Range    POC Glucose 161 (H) 65 - 140 mg/dl   CBC and differential    Collection Time: 05/15/25  6:00 AM   Result Value Ref Range    WBC 3.35 (L) 4.31 - 10.16 Thousand/uL    RBC 2.88 (L) 3.88 - 5.62 Million/uL    Hemoglobin 9.0 (L) 12.0 - 17.0 g/dL    Hematocrit 26.1 (L) 36.5 - 49.3 %    MCV 91 82 - 98 fL    MCH 31.3 26.8 - 34.3 pg    MCHC 34.5 31.4 - 37.4 g/dL    RDW 12.0 11.6 - 15.1 %    MPV 10.1 8.9 - 12.7 fL    Platelets 115 (L) 149 - 390 Thousands/uL   Basic metabolic panel    Collection Time: 05/15/25  6:00 AM   Result Value Ref Range    Sodium 136 135 - 147 mmol/L    Potassium 3.9 3.5 - 5.3 mmol/L    Chloride 98 96 - 108 mmol/L    CO2 29 21 - 32 mmol/L    ANION GAP 9 4 - 13 mmol/L    BUN 27 (H) 5 - 25 mg/dL    Creatinine 1.25 0.60 - 1.30 mg/dL    Glucose 155 (H) 65 - 140 mg/dL    Glucose, Fasting 155 (H) 65 - 99 mg/dL    Calcium 8.5 8.4 - 10.2 mg/dL    eGFR 60 ml/min/1.73sq m   Manual Differential(PHLEBS Do Not Order)    Collection Time: 05/15/25  6:00 AM   Result Value Ref Range    Segmented % 53 43 - 75 %    Bands % 5 0 - 8 %    Lymphocytes % 33 14 - 44 %    Monocytes % 0 (L) 4 - 12 %    Eosinophils % 3 0 - 6 %    Basophils % 1 0 - 1 %    Metamyelocytes % 1 0 - 1 %    Atypical Lymphocytes % 4 (H) <=0 %    Absolute Neutrophils 1.94 1.85 - 7.62 Thousand/uL    Absolute Lymphocytes 1.24 0.60 - 4.47 Thousand/uL    Absolute Monocytes 0.00 0.00  - 1.22 Thousand/uL    Absolute Eosinophils 0.10 0.00 - 0.40 Thousand/uL    Absolute Basophils 0.03 0.00 - 0.10 Thousand/uL    Absolute Metamyelocytes 0.03 0.00 - 0.10 Thousand/uL    Total Counted      Platelet Estimate Decreased (A) Adequate    Anisocytosis Present     Ovalocytes Present     Poikilocytes Present     Polychromasia Present    Fingerstick Glucose (POCT)    Collection Time: 05/15/25  6:10 AM   Result Value Ref Range    POC Glucose 153 (H) 65 - 140 mg/dl   Fingerstick Glucose (POCT)    Collection Time: 05/15/25 10:49 AM   Result Value Ref Range    POC Glucose 212 (H) 65 - 140 mg/dl   Fingerstick Glucose (POCT)    Collection Time: 05/15/25  3:56 PM   Result Value Ref Range    POC Glucose 161 (H) 65 - 140 mg/dl   Fingerstick Glucose (POCT)    Collection Time: 05/15/25  9:13 PM   Result Value Ref Range    POC Glucose 114 65 - 140 mg/dl   Urinalysis with microscopic    Collection Time: 05/16/25 12:56 AM   Result Value Ref Range    Color, UA Light Yellow     Clarity, UA Turbid     Specific Gravity, UA 1.013 1.003 - 1.030    pH, UA 6.5 4.5, 5.0, 5.5, 6.0, 6.5, 7.0, 7.5, 8.0    Leukocytes, UA Large (A) Negative    Nitrite, UA Positive (A) Negative    Protein, UA Negative Negative mg/dl    Glucose, UA Negative Negative mg/dl    Ketones, UA Negative Negative mg/dl    Urobilinogen, UA <2.0 <2.0 mg/dl mg/dl    Bilirubin, UA Negative Negative    Occult Blood, UA Negative Negative    RBC, UA None Seen None Seen, 1-2 /hpf    WBC, UA Innumerable (A) None Seen, 1-2 /hpf    Epithelial Cells None Seen None Seen, Occasional /hpf    Bacteria, UA Innumerable (A) None Seen, Occasional /hpf    Amorphous Crystals, UA Occasional    Basic metabolic panel    Collection Time: 05/16/25  6:18 AM   Result Value Ref Range    Sodium 135 135 - 147 mmol/L    Potassium 4.2 3.5 - 5.3 mmol/L    Chloride 98 96 - 108 mmol/L    CO2 29 21 - 32 mmol/L    ANION GAP 8 4 - 13 mmol/L    BUN 27 (H) 5 - 25 mg/dL    Creatinine 1.19 0.60 - 1.30 mg/dL     Glucose 131 65 - 140 mg/dL    Glucose, Fasting 131 (H) 65 - 99 mg/dL    Calcium 8.8 8.4 - 10.2 mg/dL    eGFR 63 ml/min/1.73sq m   Fingerstick Glucose (POCT)    Collection Time: 05/16/25  6:22 AM   Result Value Ref Range    POC Glucose 143 (H) 65 - 140 mg/dl   Fingerstick Glucose (POCT)    Collection Time: 05/16/25 10:08 AM   Result Value Ref Range    POC Glucose 161 (H) 65 - 140 mg/dl       CT abdomen pelvis wo contrast  Result Date: 4/30/2025  Narrative: CT ABDOMEN AND PELVIS WITHOUT IV CONTRAST INDICATION: Gram-negative bacteremia, evaluate for source infection. COMPARISON: None. TECHNIQUE: CT examination of the abdomen and pelvis was performed without intravenous contrast. Multiplanar 2D reformatted images were created from the source data. This examination, like all CT scans performed in the [facility], was performed utilizing techniques to minimize radiation dose exposure, including the use of iterative reconstruction and automated exposure control. Radiation dose length product (DLP) for this visit: 560.94 mGy-cm. Enteric Contrast: Not administered. FINDINGS: ABDOMEN LOWER CHEST: Calcified granuloma in the right middle lobe. LIVER/BILIARY TREE: 1 cm cyst in the right lobe. Liver is otherwise unremarkable.1 GALLBLADDER: No calcified gallstones. No pericholecystic inflammatory change. SPLEEN: Unremarkable. PANCREAS: Unremarkable. ADRENAL GLANDS: Unremarkable. KIDNEYS/URETERS: Unremarkable. No hydronephrosis. STOMACH AND BOWEL: Large amount of formed stool throughout the entire colon. No evidence of obstruction. APPENDIX: Normal. ABDOMINOPELVIC CAVITY: No ascites. No pneumoperitoneum. No lymphadenopathy. VESSELS: Unremarkable for patient's age. PELVIS REPRODUCTIVE ORGANS: Unremarkable for patient's age. URINARY BLADDER: Unremarkable. ABDOMINAL WALL/INGUINAL REGIONS: Mild infiltration of the anterior abdominal wall in the left lower quadrant, possibly a soft tissue contusion. BONES: No acute  fracture or suspicious osseous lesion.     Impression: No acute inflammatory process in the abdomen or pelvis. Large amount of stool throughout the colon. Workstation performed: [id number]     XR abdomen obstruction series  Result Date: 4/30/2025  Narrative: XR ABDOMEN OBSTRUCTION SERIES INDICATION: Constipation, lethargy, gram-negative bacteremia. Confusion, newly diagnosed intracranial mass. Constipation. COMPARISON: MRI abdomen 3/30/2025 and CT chest abdomen pelvis 3/29/2025 FINDINGS: Large volume of colonic stool. Otherwise unremarkable bowel gas pattern. No pneumoperitoneum or abnormal air-fluid levels. No pathologic calcification or soft tissue mass. Bones are unremarkable for patient's age. Examination of the chest reveals clear lungs and a normal cardiomediastinal silhouette. Right PICC with tip projecting over the superior cavoatrial junction.     Impression: 1.  Large colonic stool burden without obstruction or other acute abdominal findings. 2.  No acute cardiopulmonary findings. Resident: CINTHYA FAJARDO I, the attending radiologist, have reviewed the images and agree with the final report above. Workstation performed: [id number]     XR chest portable ICU  Result Date: 4/21/2025  Narrative: XR CHEST PORTABLE ICU INDICATION: ETT placement. COMPARISON: CXR 4/13/2025, chest CT 3/29/2025. FINDINGS: ET tube 3 cm above the arnav. Right PICC in upper right atrium. Clear lungs. No pneumothorax or pleural effusion. Normal cardiomediastinal silhouette. Bones are unremarkable for age. Normal upper abdomen.     Impression: No acute cardiopulmonary disease. ET tube 3 cm above the arnav. Workstation performed: [id number]     CT head wo contrast  Result Date: 4/21/2025  Narrative: CT BRAIN - WITHOUT CONTRAST INDICATION:   s/p EVD removal follow up hydrocephalus. COMPARISON: 4/20/2025 TECHNIQUE:  CT examination of the brain was performed.  Multiplanar 2D reformatted images were created from the source data. Radiation  dose length product (DLP) for this visit:  1099 mGy-cm .  This examination, like all CT scans performed in the [facility], was performed utilizing techniques to minimize radiation dose exposure, including the use of iterative reconstruction and automated exposure control. IMAGE QUALITY:  Diagnostic. FINDINGS: PARENCHYMA: Ill-defined mixed density in the left gangliocapsular region corresponds to the lesion seen on prior MRI. Again noted is linear low-attenuation and trace blood products along the ventricular catheter tract, unchanged. VENTRICLES AND EXTRA-AXIAL SPACES: There is increasing ventricular caliber in the interim which is most pronounced involving the left lateral ventricle. There is no evidence of an acute, decompensated hydrocephalus. Small volume blood products in the occipital horns are decreasing. There is peripheral high attenuation along ventricular system which may correspond to subependymal spread of neoplasm. There is no hydrocephalus. 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 VISUALIZED ORBITS: Normal visualized orbits. PARANASAL SINUSES: Normal visualized paranasal sinuses. CALVARIUM AND EXTRACRANIAL SOFT TISSUES: Left frontal siomara hole     Impression: 1. Increasing ventricular caliber without evidence of an acute, decompensated hydrocephalus. Recommend continued close follow-up. 2. Improving intraventricular blood products. The study was marked in EPIC for immediate notification. Workstation performed: [id number]     MRI cervical spine w wo contrast  Result Date: 4/20/2025  Narrative: MRI CERVICAL, THORACIC AND LUMBAR SPINE WITH AND WITHOUT CONTRAST INDICATION: new b cell lymphoma. COMPARISON:  None. TECHNIQUE:  Multiplanar, multisequence imaging of the total spine was performed before and after gadolinium administration. . IV Contrast:  7 mL of Gadobutrol injection  (SINGLE-DOSE) IMAGE QUALITY: Motion-degraded, which reduces diagnostic sensitivity. FINDINGS: ALIGNMENT: Normal alignment of the cervical spine. No acute fracture. Multilevel endplate centered marrow changes. Scattered areas of intrinsic T1/T2 hyperintensity in the vertebral bodies, most consistent with osseous venous malformations/hemangiomas. MARROW SIGNAL: As above. CORD: No cord signal abnormality that can be confirmed in 2 planes. PREVERTEBRAL AND PARASPINAL SOFT TISSUES: No acute abnormality. VISUALIZED POSTERIOR FOSSA: Please refer to concurrent brain MRI. CERVICAL DISC SPACES: Multilevel mild degenerative disc disease. C2-C3: Disc osteophyte complex. No significant neuroforaminal narrowing. Mild spinal canal stenosis. C3-C4: Disc osteophyte complex. Uncovertebral and facet arthropathy contribute to marked bilateral neuroforaminal narrowing, left greater than right. Moderate spinal canal stenosis. C4-C5: Disc osteophyte complex. Uncovertebral and facet arthropathy contribute to mild-moderate bilateral neuroforaminal narrowing. Mild spinal canal stenosis. C5-C6: Disc osteophyte complex. Uncovertebral and facet arthropathy contribute to moderate-marked bilateral neuroforaminal narrowing. Moderate spinal canal stenosis. C6-C7: Disc osteophyte complex. Uncovertebral and facet arthropathy contribute to marked left with moderate right neuroforaminal narrowing. Mild-moderate spinal canal stenosis. C7-T1: No significant neuroforaminal narrowing or spinal canal stenosis. THORACIC DEGENERATIVE CHANGE: Mild spondylotic changes without high-grade neuroforaminal narrowing or spinal canal stenosis. LUMBAR DISC SPACES: Multilevel mild degenerative disc disease. L1-L2: No significant neuroforaminal narrowing or spinal canal stenosis. L2-L3: No significant neuroforaminal narrowing or spinal canal stenosis. L3-L4: Disc bulge. No significant neuroforaminal narrowing. Mild spinal canal stenosis. L4-L5: Disc bulge, noting  abutment of the bilateral traversing L5 nerve roots. Mild bilateral neuroforaminal narrowing. Mild spinal canal stenosis. L5-S1: Disc bulge with superimposed central disc herniation, noting abutment of the right greater than left traversing S1 nerve roots and mild-moderate central spinal canal stenosis. Mild bilateral neuroforaminal narrowing. POSTCONTRAST IMAGING: No abnormal enhancement. OTHER FINDINGS: Retropharyngeal course of the right carotid artery. Multiple large left thyroid nodules measuring greater than 1.5 cm. Scattered T2 hyperintensities in the liver, statistically cyst versus hemangioma. Small amount of debris is noted within the tracheobronchial tree. Patchy atelectasis with small bilateral pleural effusions in the partially visualized lungs. Partially visualized enteric and endotracheal tubes.     Impression: 1.  No evidence of lymphomatous involvement of the spine. 2.  Multilevel spondylotic changes, as detailed above. See narrative above for full details. 3.  Multiple large left thyroid nodules, for which further evaluation with thyroid sonogram is recommended if not previously performed. The study was marked in EPIC for immediate notification. Workstation performed: [id number]     MRI lumbar spine w wo contrast  Result Date: 4/20/2025  Narrative: MRI CERVICAL, THORACIC AND LUMBAR SPINE WITH AND WITHOUT CONTRAST INDICATION: new b cell lymphoma. COMPARISON:  None. TECHNIQUE:  Multiplanar, multisequence imaging of the total spine was performed before and after gadolinium administration. . IV Contrast:  7 mL of Gadobutrol injection (SINGLE-DOSE) IMAGE QUALITY: Motion-degraded, which reduces diagnostic sensitivity. FINDINGS: ALIGNMENT: Normal alignment of the cervical spine. No acute fracture. Multilevel endplate centered marrow changes. Scattered areas of intrinsic T1/T2 hyperintensity in the vertebral bodies, most consistent with osseous venous malformations/hemangiomas. MARROW SIGNAL: As above.  CORD: No cord signal abnormality that can be confirmed in 2 planes. PREVERTEBRAL AND PARASPINAL SOFT TISSUES: No acute abnormality. VISUALIZED POSTERIOR FOSSA: Please refer to concurrent brain MRI. CERVICAL DISC SPACES: Multilevel mild degenerative disc disease. C2-C3: Disc osteophyte complex. No significant neuroforaminal narrowing. Mild spinal canal stenosis. C3-C4: Disc osteophyte complex. Uncovertebral and facet arthropathy contribute to marked bilateral neuroforaminal narrowing, left greater than right. Moderate spinal canal stenosis. C4-C5: Disc osteophyte complex. Uncovertebral and facet arthropathy contribute to mild-moderate bilateral neuroforaminal narrowing. Mild spinal canal stenosis. C5-C6: Disc osteophyte complex. Uncovertebral and facet arthropathy contribute to moderate-marked bilateral neuroforaminal narrowing. Moderate spinal canal stenosis. C6-C7: Disc osteophyte complex. Uncovertebral and facet arthropathy contribute to marked left with moderate right neuroforaminal narrowing. Mild-moderate spinal canal stenosis. C7-T1: No significant neuroforaminal narrowing or spinal canal stenosis. THORACIC DEGENERATIVE CHANGE: Mild spondylotic changes without high-grade neuroforaminal narrowing or spinal canal stenosis. LUMBAR DISC SPACES: Multilevel mild degenerative disc disease. L1-L2: No significant neuroforaminal narrowing or spinal canal stenosis. L2-L3: No significant neuroforaminal narrowing or spinal canal stenosis. L3-L4: Disc bulge. No significant neuroforaminal narrowing. Mild spinal canal stenosis. L4-L5: Disc bulge, noting abutment of the bilateral traversing L5 nerve roots. Mild bilateral neuroforaminal narrowing. Mild spinal canal stenosis. L5-S1: Disc bulge with superimposed central disc herniation, noting abutment of the right greater than left traversing S1 nerve roots and mild-moderate central spinal canal stenosis. Mild bilateral neuroforaminal narrowing. POSTCONTRAST IMAGING: No abnormal  enhancement. OTHER FINDINGS: Retropharyngeal course of the right carotid artery. Multiple large left thyroid nodules measuring greater than 1.5 cm. Scattered T2 hyperintensities in the liver, statistically cyst versus hemangioma. Small amount of debris is noted within the tracheobronchial tree. Patchy atelectasis with small bilateral pleural effusions in the partially visualized lungs. Partially visualized enteric and endotracheal tubes.     Impression: 1.  No evidence of lymphomatous involvement of the spine. 2.  Multilevel spondylotic changes, as detailed above. See narrative above for full details. 3.  Multiple large left thyroid nodules, for which further evaluation with thyroid sonogram is recommended if not previously performed. The study was marked in EPIC for immediate notification. Workstation performed: [id number]     MRI thoracic spine w wo contrast  Result Date: 4/20/2025  Narrative: MRI CERVICAL, THORACIC AND LUMBAR SPINE WITH AND WITHOUT CONTRAST INDICATION: new b cell lymphoma. COMPARISON:  None. TECHNIQUE:  Multiplanar, multisequence imaging of the total spine was performed before and after gadolinium administration. . IV Contrast:  7 mL of Gadobutrol injection (SINGLE-DOSE) IMAGE QUALITY: Motion-degraded, which reduces diagnostic sensitivity. FINDINGS: ALIGNMENT: Normal alignment of the cervical spine. No acute fracture. Multilevel endplate centered marrow changes. Scattered areas of intrinsic T1/T2 hyperintensity in the vertebral bodies, most consistent with osseous venous malformations/hemangiomas. MARROW SIGNAL: As above. CORD: No cord signal abnormality that can be confirmed in 2 planes. PREVERTEBRAL AND PARASPINAL SOFT TISSUES: No acute abnormality. VISUALIZED POSTERIOR FOSSA: Please refer to concurrent brain MRI. CERVICAL DISC SPACES: Multilevel mild degenerative disc disease. C2-C3: Disc osteophyte complex. No significant neuroforaminal narrowing. Mild spinal canal stenosis. C3-C4: Disc  osteophyte complex. Uncovertebral and facet arthropathy contribute to marked bilateral neuroforaminal narrowing, left greater than right. Moderate spinal canal stenosis. C4-C5: Disc osteophyte complex. Uncovertebral and facet arthropathy contribute to mild-moderate bilateral neuroforaminal narrowing. Mild spinal canal stenosis. C5-C6: Disc osteophyte complex. Uncovertebral and facet arthropathy contribute to moderate-marked bilateral neuroforaminal narrowing. Moderate spinal canal stenosis. C6-C7: Disc osteophyte complex. Uncovertebral and facet arthropathy contribute to marked left with moderate right neuroforaminal narrowing. Mild-moderate spinal canal stenosis. C7-T1: No significant neuroforaminal narrowing or spinal canal stenosis. THORACIC DEGENERATIVE CHANGE: Mild spondylotic changes without high-grade neuroforaminal narrowing or spinal canal stenosis. LUMBAR DISC SPACES: Multilevel mild degenerative disc disease. L1-L2: No significant neuroforaminal narrowing or spinal canal stenosis. L2-L3: No significant neuroforaminal narrowing or spinal canal stenosis. L3-L4: Disc bulge. No significant neuroforaminal narrowing. Mild spinal canal stenosis. L4-L5: Disc bulge, noting abutment of the bilateral traversing L5 nerve roots. Mild bilateral neuroforaminal narrowing. Mild spinal canal stenosis. L5-S1: Disc bulge with superimposed central disc herniation, noting abutment of the right greater than left traversing S1 nerve roots and mild-moderate central spinal canal stenosis. Mild bilateral neuroforaminal narrowing. POSTCONTRAST IMAGING: No abnormal enhancement. OTHER FINDINGS: Retropharyngeal course of the right carotid artery. Multiple large left thyroid nodules measuring greater than 1.5 cm. Scattered T2 hyperintensities in the liver, statistically cyst versus hemangioma. Small amount of debris is noted within the tracheobronchial tree. Patchy atelectasis with small bilateral pleural effusions in the partially  visualized lungs. Partially visualized enteric and endotracheal tubes.     Impression: 1.  No evidence of lymphomatous involvement of the spine. 2.  Multilevel spondylotic changes, as detailed above. See narrative above for full details. 3.  Multiple large left thyroid nodules, for which further evaluation with thyroid sonogram is recommended if not previously performed. The study was marked in EPIC for immediate notification. Workstation performed: [id number]     MRI Brain BT w wo Contrast  Result Date: 4/20/2025  Narrative: MRI BRAIN WITH AND WITHOUT CONTRAST INDICATION: Please complete MRI brain w/wo BT protocol. COMPARISON: MRI brain 4/11/2025. TECHNIQUE: Multiplanar, multisequence imaging of the brain and sella was performed before and after gadolinium administration. IV Contrast:  7 mL of Gadobutrol injection IMAGE QUALITY:   Motion-degraded, which reduces diagnostic sensitivity. FINDINGS: BRAIN PARENCHYMA: Redemonstrated mass centered in the left gangliocapsular region, noting slightly increased T2/FLAIR signal abnormality extending inferiorly compared to the prior study 4/11/2025. Decreased mass effect on the lateral ventricles compared to the prior study, noting there is a prior left frontal catheter tract with edema and hemosiderin deposition along the tract. No kerwin hydrocephalus; however, there are intraventricular blood products, new from the MRI 4/11/2025. Patchy nonspecific areas of T2/FLAIR signal abnormality in the left corona radiata/centrum semiovale, for example series 5, image 28) are also again noted. When compared to the prior MRI, there is decreased enhancement associated with the mass, noted to measure approximately 2.7 x 1.8 cm, previously 3.4 x 2.2 cm. Again seen are enhancing foci in the left posterior centrum semiovale measuring up to 4 mm, which could represent perivascular lesions. Redemonstrated leptomeningeal seeding and multiple areas of ependymal nodules and thickening. There is  scattered leptomeningeal/ependymal enhancement about the frontal, temporal and posterior horns as well as the dependent aspect of the fourth ventricle.  There is also signal abnormality again noted along the left cerebral peduncle with faint associated enhancement. Transependymal flow of CSF is slightly decreased in the prior study There is hemosiderin deposition identified along the surface of the brainstem and upper cord. Perfusion: No definite elevated perfusion metrics. Diffusion: The above periventricular and intraventricular lesions demonstrate mild diffusion signal abnormality VENTRICLES: As above. SELLA AND PITUITARY GLAND: Posterior pituitary 6 mm T2 hyperintensity is identified on series 9, image 170. ORBITS: No acute abnormality. PARANASAL SINUSES: Trace mucosal thickening. VASCULATURE:  Evaluation of the major intracranial vasculature demonstrates appropriate flow voids. CALVARIUM AND SKULL BASE: No acute abnormality. EXTRACRANIAL SOFT TISSUES: Fluid secretions are seen in the pharynx. Partially visualized oral route catheters.     Impression: Within the confines of a motion-degraded examination: 1.  Decreased enhancement and perfusion metrics associated with the left gangliocapsular mass (biopsy-proven lymphoma), noting decreased mass effect on the lateral ventricles and decreased transependymal flow of CSF. The change from MRI dated 4/11/2025 could be related to medical therapy. See narrative above for full discussion. 2.  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 3.  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 This could be further evaluated on nonemergent MRI pituitary protocol. The study was marked in EPIC for immediate notification. Workstation performed: [id number]     CT head wo contrast  Result Date: 4/20/2025  Narrative: CT BRAIN - WITHOUT CONTRAST INDICATION:   s/p EVD removal  follow up hydrocephalus. COMPARISON: CT head 4/14/2025. TECHNIQUE:  CT examination of the brain was performed.  Multiplanar 2D reformatted images were created from the source data. Radiation dose length product (DLP) for this visit:  1308 mGy-cm .  This examination, like all CT scans performed in the [facility], was performed utilizing techniques to minimize radiation dose exposure, including the use of iterative reconstruction and automated exposure control. IMAGE QUALITY:  Diagnostic. FINDINGS: PARENCHYMA: Interval removal of left frontal approach ventriculostomy catheter, noting overall stable size and configuration of ventricular system, which again is noted to contain intraventricular blood products. There is hypoattenuation as well as blood  products along the prior catheter tract. Decreased pneumocephalus compared to the prior study. Redemonstrated patchy hypoattenuation corresponding to T2/FLAIR signal abnormality involving the left gangliocapsular region extending into the left frontal lobe inferiorly, better assessed on concurrent brain MRI. VENTRICLES AND EXTRA-AXIAL SPACES: As above. VISUALIZED ORBITS: No acute abnormality. PARANASAL SINUSES: Trace mucosal thickening. CALVARIUM AND EXTRACRANIAL SOFT TISSUES: Partially visualized oral route catheters with adjacent secretions in the oropharynx.     Impression: 1.  Interval removal of left frontal approach ventriculostomy catheter, noting stable size and configuration of the ventricular system, which contains intraventricular blood products. Blood products also noted along the site of the prior catheter tract. 2.  Refer to concurrent brain MRI for characterization of hypoattenuation corresponding to T2/FLAIR signal abnormality involving the left gangliocapsular region extending into the left frontal lobe inferiorly. The study was marked in EPIC for immediate notification. Workstation performed: [id number]     MRI follow up neuro  Result  Date: 4/18/2025  Narrative: MRI FOLLOW UP NEURO INDICATION: new b cell lymphoma. COMPARISON:  None. TECHNIQUE:  Multiplanar, multisequence imaging of the thoracic spine was scheduled to be performed. However, due to patient inability to tolerate the examination, it was prematurely terminated. Localizer series as well as a coronal T2 image were obtained. IMAGE QUALITY: Nondiagnostic. The obtained images are also motion degraded. FINDINGS: Hepatic, pulmonary and thyroid findings are better evaluated on dedicated CTA chest, abdomen and pelvis 3/29/2025.     Impression: Nondiagnostic examination. Recommend repeat examination, as clinically appropriate/tolerable. Workstation performed: [id number]       I have personally reviewed labs, imaging studies, and pertinent reports.      This note has been generated by voice recognition software system.  Therefore, there may be spelling, grammar, and or syntax errors. Please contact if questions arise.

## 2025-05-17 PROBLEM — R65.10 SIRS (SYSTEMIC INFLAMMATORY RESPONSE SYNDROME) (HCC): Status: ACTIVE | Noted: 2025-05-17

## 2025-05-17 LAB
ALBUMIN SERPL BCG-MCNC: 3.4 G/DL (ref 3.5–5)
ALP SERPL-CCNC: 66 U/L (ref 34–104)
ALT SERPL W P-5'-P-CCNC: 44 U/L (ref 7–52)
ANION GAP SERPL CALCULATED.3IONS-SCNC: 13 MMOL/L (ref 4–13)
AST SERPL W P-5'-P-CCNC: 17 U/L (ref 13–39)
BACTERIA UR QL AUTO: ABNORMAL /HPF
BILIRUB SERPL-MCNC: 0.84 MG/DL (ref 0.2–1)
BILIRUB UR QL STRIP: NEGATIVE
BUN SERPL-MCNC: 25 MG/DL (ref 5–25)
CALCIUM ALBUM COR SERPL-MCNC: 8.8 MG/DL (ref 8.3–10.1)
CALCIUM SERPL-MCNC: 8.3 MG/DL (ref 8.4–10.2)
CHLORIDE SERPL-SCNC: 92 MMOL/L (ref 96–108)
CLARITY UR: CLEAR
CO2 SERPL-SCNC: 31 MMOL/L (ref 21–32)
COLOR UR: ABNORMAL
CREAT SERPL-MCNC: 1.38 MG/DL (ref 0.6–1.3)
ERYTHROCYTE [DISTWIDTH] IN BLOOD BY AUTOMATED COUNT: 14.8 % (ref 11.6–15.1)
GFR SERPL CREATININE-BSD FRML MDRD: 53 ML/MIN/1.73SQ M
GLUCOSE P FAST SERPL-MCNC: 159 MG/DL (ref 65–99)
GLUCOSE SERPL-MCNC: 154 MG/DL (ref 65–140)
GLUCOSE SERPL-MCNC: 159 MG/DL (ref 65–140)
GLUCOSE SERPL-MCNC: 176 MG/DL (ref 65–140)
GLUCOSE SERPL-MCNC: 191 MG/DL (ref 65–140)
GLUCOSE SERPL-MCNC: 206 MG/DL (ref 65–140)
GLUCOSE UR STRIP-MCNC: ABNORMAL MG/DL
HCT VFR BLD AUTO: 24.1 % (ref 36.5–49.3)
HGB BLD-MCNC: 8.5 G/DL (ref 12–17)
HGB UR QL STRIP.AUTO: NEGATIVE
KETONES UR STRIP-MCNC: NEGATIVE MG/DL
LACTATE SERPL-SCNC: 4.4 MMOL/L (ref 0.5–2)
LACTATE SERPL-SCNC: 5.1 MMOL/L (ref 0.5–2)
LEUKOCYTE ESTERASE UR QL STRIP: ABNORMAL
MCH RBC QN AUTO: 32 PG (ref 26.8–34.3)
MCHC RBC AUTO-ENTMCNC: 35.3 G/DL (ref 31.4–37.4)
MCV RBC AUTO: 91 FL (ref 82–98)
MUCOUS THREADS UR QL AUTO: ABNORMAL
NITRITE UR QL STRIP: NEGATIVE
NON-SQ EPI CELLS URNS QL MICRO: ABNORMAL /HPF
NRBC BLD AUTO-RTO: 11 /100 WBCS
PH UR STRIP.AUTO: 7.5 [PH]
PLATELET # BLD AUTO: 85 THOUSANDS/UL (ref 149–390)
PLATELET BLD QL SMEAR: ABNORMAL
PMV BLD AUTO: 9.7 FL (ref 8.9–12.7)
POTASSIUM SERPL-SCNC: 3.8 MMOL/L (ref 3.5–5.3)
PROCALCITONIN SERPL-MCNC: 7.39 NG/ML
PROT SERPL-MCNC: 5.5 G/DL (ref 6.4–8.4)
PROT UR STRIP-MCNC: ABNORMAL MG/DL
RBC # BLD AUTO: 2.66 MILLION/UL (ref 3.88–5.62)
RBC #/AREA URNS AUTO: ABNORMAL /HPF
RBC MORPH BLD: NORMAL
SODIUM SERPL-SCNC: 136 MMOL/L (ref 135–147)
SP GR UR STRIP.AUTO: 1.01 (ref 1–1.03)
UROBILINOGEN UR STRIP-ACNC: <2 MG/DL
WBC # BLD AUTO: 1.26 THOUSAND/UL (ref 4.31–10.16)
WBC #/AREA URNS AUTO: ABNORMAL /HPF

## 2025-05-17 PROCEDURE — 82948 REAGENT STRIP/BLOOD GLUCOSE: CPT

## 2025-05-17 PROCEDURE — 99223 1ST HOSP IP/OBS HIGH 75: CPT | Performed by: INTERNAL MEDICINE

## 2025-05-17 PROCEDURE — 80053 COMPREHEN METABOLIC PANEL: CPT | Performed by: STUDENT IN AN ORGANIZED HEALTH CARE EDUCATION/TRAINING PROGRAM

## 2025-05-17 PROCEDURE — 81001 URINALYSIS AUTO W/SCOPE: CPT | Performed by: PHYSICIAN ASSISTANT

## 2025-05-17 PROCEDURE — 97129 THER IVNTJ 1ST 15 MIN: CPT

## 2025-05-17 PROCEDURE — 87040 BLOOD CULTURE FOR BACTERIA: CPT | Performed by: PHYSICIAN ASSISTANT

## 2025-05-17 PROCEDURE — 87086 URINE CULTURE/COLONY COUNT: CPT | Performed by: PHYSICIAN ASSISTANT

## 2025-05-17 PROCEDURE — 97130 THER IVNTJ EA ADDL 15 MIN: CPT

## 2025-05-17 PROCEDURE — 83605 ASSAY OF LACTIC ACID: CPT | Performed by: PHYSICIAN ASSISTANT

## 2025-05-17 PROCEDURE — 99232 SBSQ HOSP IP/OBS MODERATE 35: CPT | Performed by: INTERNAL MEDICINE

## 2025-05-17 PROCEDURE — 99232 SBSQ HOSP IP/OBS MODERATE 35: CPT | Performed by: PHYSICIAN ASSISTANT

## 2025-05-17 PROCEDURE — G0545 PR INHERENT VISIT TO INPT: HCPCS | Performed by: INTERNAL MEDICINE

## 2025-05-17 PROCEDURE — 97530 THERAPEUTIC ACTIVITIES: CPT

## 2025-05-17 PROCEDURE — 99233 SBSQ HOSP IP/OBS HIGH 50: CPT | Performed by: INTERNAL MEDICINE

## 2025-05-17 PROCEDURE — 87186 SC STD MICRODIL/AGAR DIL: CPT | Performed by: PHYSICIAN ASSISTANT

## 2025-05-17 PROCEDURE — 87154 CUL TYP ID BLD PTHGN 6+ TRGT: CPT | Performed by: PHYSICIAN ASSISTANT

## 2025-05-17 PROCEDURE — 85027 COMPLETE CBC AUTOMATED: CPT | Performed by: STUDENT IN AN ORGANIZED HEALTH CARE EDUCATION/TRAINING PROGRAM

## 2025-05-17 PROCEDURE — 87077 CULTURE AEROBIC IDENTIFY: CPT | Performed by: PHYSICIAN ASSISTANT

## 2025-05-17 PROCEDURE — 84145 PROCALCITONIN (PCT): CPT | Performed by: PHYSICIAN ASSISTANT

## 2025-05-17 RX ORDER — SODIUM CHLORIDE 9 MG/ML
100 INJECTION, SOLUTION INTRAVENOUS CONTINUOUS
Status: DISCONTINUED | OUTPATIENT
Start: 2025-05-17 | End: 2025-05-17

## 2025-05-17 RX ORDER — ACETAMINOPHEN 325 MG/1
650 TABLET ORAL ONCE
Start: 2025-05-28 | End: 2025-05-24

## 2025-05-17 RX ORDER — DIPHENHYDRAMINE HYDROCHLORIDE 50 MG/ML
25 INJECTION, SOLUTION INTRAMUSCULAR; INTRAVENOUS ONCE
Start: 2025-05-28 | End: 2025-05-24

## 2025-05-17 RX ORDER — SODIUM CHLORIDE 9 MG/ML
20 INJECTION, SOLUTION INTRAVENOUS ONCE AS NEEDED
OUTPATIENT
Start: 2025-05-28

## 2025-05-17 RX ORDER — SODIUM CHLORIDE, SODIUM GLUCONATE, SODIUM ACETATE, POTASSIUM CHLORIDE, MAGNESIUM CHLORIDE, SODIUM PHOSPHATE, DIBASIC, AND POTASSIUM PHOSPHATE .53; .5; .37; .037; .03; .012; .00082 G/100ML; G/100ML; G/100ML; G/100ML; G/100ML; G/100ML; G/100ML
50 INJECTION, SOLUTION INTRAVENOUS CONTINUOUS
Status: DISCONTINUED | OUTPATIENT
Start: 2025-05-17 | End: 2025-05-21

## 2025-05-17 RX ORDER — SODIUM CHLORIDE, SODIUM GLUCONATE, SODIUM ACETATE, POTASSIUM CHLORIDE, MAGNESIUM CHLORIDE, SODIUM PHOSPHATE, DIBASIC, AND POTASSIUM PHOSPHATE .53; .5; .37; .037; .03; .012; .00082 G/100ML; G/100ML; G/100ML; G/100ML; G/100ML; G/100ML; G/100ML
100 INJECTION, SOLUTION INTRAVENOUS CONTINUOUS
Status: DISCONTINUED | OUTPATIENT
Start: 2025-05-17 | End: 2025-05-17

## 2025-05-17 RX ADMIN — HEPARIN SODIUM 5000 UNITS: 5000 INJECTION INTRAVENOUS; SUBCUTANEOUS at 05:39

## 2025-05-17 RX ADMIN — HEPARIN SODIUM 5000 UNITS: 5000 INJECTION INTRAVENOUS; SUBCUTANEOUS at 13:42

## 2025-05-17 RX ADMIN — SODIUM CHLORIDE, SODIUM GLUCONATE, SODIUM ACETATE, POTASSIUM CHLORIDE, MAGNESIUM CHLORIDE, SODIUM PHOSPHATE, DIBASIC, AND POTASSIUM PHOSPHATE 150 ML/HR: .53; .5; .37; .037; .03; .012; .00082 INJECTION, SOLUTION INTRAVENOUS at 22:09

## 2025-05-17 RX ADMIN — INSULIN LISPRO 6 UNITS: 100 INJECTION, SOLUTION INTRAVENOUS; SUBCUTANEOUS at 08:29

## 2025-05-17 RX ADMIN — HEPARIN SODIUM 5000 UNITS: 5000 INJECTION INTRAVENOUS; SUBCUTANEOUS at 21:57

## 2025-05-17 RX ADMIN — QUETIAPINE 12.5 MG: 25 TABLET ORAL at 12:25

## 2025-05-17 RX ADMIN — SITAGLIPTIN 50 MG: 50 TABLET, FILM COATED ORAL at 08:30

## 2025-05-17 RX ADMIN — FILGRASTIM-AAFI 480 MCG: 480 INJECTION, SOLUTION SUBCUTANEOUS at 13:42

## 2025-05-17 RX ADMIN — SODIUM CHLORIDE 1000 ML: 0.9 INJECTION, SOLUTION INTRAVENOUS at 12:15

## 2025-05-17 RX ADMIN — INSULIN LISPRO 1 UNITS: 100 INJECTION, SOLUTION INTRAVENOUS; SUBCUTANEOUS at 08:28

## 2025-05-17 RX ADMIN — INSULIN LISPRO 1 UNITS: 100 INJECTION, SOLUTION INTRAVENOUS; SUBCUTANEOUS at 16:57

## 2025-05-17 RX ADMIN — ALLOPURINOL 300 MG: 300 TABLET ORAL at 08:30

## 2025-05-17 RX ADMIN — METFORMIN ER 500 MG 1000 MG: 500 TABLET ORAL at 08:29

## 2025-05-17 RX ADMIN — DEXAMETHASONE 1 MG: 2 TABLET ORAL at 08:30

## 2025-05-17 RX ADMIN — SODIUM CHLORIDE 100 ML/HR: 0.9 INJECTION, SOLUTION INTRAVENOUS at 12:15

## 2025-05-17 RX ADMIN — SODIUM CHLORIDE 1000 ML: 0.9 INJECTION, SOLUTION INTRAVENOUS at 13:37

## 2025-05-17 RX ADMIN — CHLORHEXIDINE GLUCONATE 15 ML: 1.2 SOLUTION ORAL at 08:30

## 2025-05-17 RX ADMIN — CHLORHEXIDINE GLUCONATE 15 ML: 1.2 SOLUTION ORAL at 21:56

## 2025-05-17 RX ADMIN — ATORVASTATIN CALCIUM 20 MG: 20 TABLET, FILM COATED ORAL at 08:31

## 2025-05-17 RX ADMIN — SODIUM CHLORIDE, SODIUM GLUCONATE, SODIUM ACETATE, POTASSIUM CHLORIDE, MAGNESIUM CHLORIDE, SODIUM PHOSPHATE, DIBASIC, AND POTASSIUM PHOSPHATE 100 ML/HR: .53; .5; .37; .037; .03; .012; .00082 INJECTION, SOLUTION INTRAVENOUS at 13:33

## 2025-05-17 RX ADMIN — PANTOPRAZOLE SODIUM 40 MG: 40 TABLET, DELAYED RELEASE ORAL at 05:39

## 2025-05-17 RX ADMIN — INSULIN LISPRO 6 UNITS: 100 INJECTION, SOLUTION INTRAVENOUS; SUBCUTANEOUS at 16:57

## 2025-05-17 RX ADMIN — INSULIN LISPRO 6 UNITS: 100 INJECTION, SOLUTION INTRAVENOUS; SUBCUTANEOUS at 12:29

## 2025-05-17 RX ADMIN — DEXAMETHASONE 1 MG: 2 TABLET ORAL at 21:56

## 2025-05-17 RX ADMIN — Medication 6 MG: at 21:56

## 2025-05-17 RX ADMIN — DOCUSATE SODIUM 100 MG: 100 CAPSULE, LIQUID FILLED ORAL at 08:31

## 2025-05-17 RX ADMIN — DOCUSATE SODIUM 100 MG: 100 CAPSULE, LIQUID FILLED ORAL at 16:52

## 2025-05-17 RX ADMIN — INSULIN LISPRO 1 UNITS: 100 INJECTION, SOLUTION INTRAVENOUS; SUBCUTANEOUS at 12:29

## 2025-05-17 RX ADMIN — SODIUM CHLORIDE, SODIUM GLUCONATE, SODIUM ACETATE, POTASSIUM CHLORIDE, MAGNESIUM CHLORIDE, SODIUM PHOSPHATE, DIBASIC, AND POTASSIUM PHOSPHATE 150 ML/HR: .53; .5; .37; .037; .03; .012; .00082 INJECTION, SOLUTION INTRAVENOUS at 22:08

## 2025-05-17 RX ADMIN — SODIUM CHLORIDE, SODIUM GLUCONATE, SODIUM ACETATE, POTASSIUM CHLORIDE, MAGNESIUM CHLORIDE, SODIUM PHOSPHATE, DIBASIC, AND POTASSIUM PHOSPHATE 150 ML/HR: .53; .5; .37; .037; .03; .012; .00082 INJECTION, SOLUTION INTRAVENOUS at 16:52

## 2025-05-17 RX ADMIN — INSULIN LISPRO 1 UNITS: 100 INJECTION, SOLUTION INTRAVENOUS; SUBCUTANEOUS at 21:58

## 2025-05-17 RX ADMIN — SENNOSIDES 17.2 MG: 8.6 TABLET, FILM COATED ORAL at 12:25

## 2025-05-17 RX ADMIN — CEFEPIME 2000 MG: 2 INJECTION, POWDER, FOR SOLUTION INTRAVENOUS at 15:15

## 2025-05-17 RX ADMIN — SODIUM CHLORIDE 1000 ML: 0.9 INJECTION, SOLUTION INTRAVENOUS at 13:55

## 2025-05-17 RX ADMIN — QUETIAPINE 50 MG: 25 TABLET ORAL at 21:56

## 2025-05-17 NOTE — PROGRESS NOTES
"   25 1230   Pain Assessment   Pain Assessment Tool 0-10   Restrictions/Precautions   Precautions 1:1;Bed/chair alarms;Cognitive;Fall Risk;Impulsive;Multiple lines;Supervision on toilet/commode  (Q15 vitals and IV hookup due to sepsis alert)   Comprehension   Comprehension (FIM) 3 - Understands basic info/conversation 50-74% of time   Expression   Expression (FIM) 4 - Expresses basic info/needs 75-90% of time   Social Interaction   Social Interaction (FIM) 5 - Interacts appropriately with others 90% of time   Problem Solving   Problem solving (FIM) 1 - Needs direction nearly all the time   Memory   Memory (FIM) 2 - Recognizes, recalls/performs 25-49%   Speech/Language/Cognition Assessmetn   Treatment Assessment Pt was awake, alert and in recliner for session today. Of note, LEONID Russell did mention to current SLP about how pt was flagged for the sepsis protocol and how is is currently receiving multiple IV medications in addition to VS being checked every 15 minutes. However, pt denies feeling any \"worse\" and denies pain currently. Additionally, pt's spouse was to be present for session today but was not in at the beginning of the session but closer to the end of the session. However, SLP has had pt's spouse be present for other sessions so she is aware of pt's deficits which were reviewed in the family meeting as well as observing in session post meeting. Can plan for f/u FT closer to d/c w/ a handout given recommendations. Otherwise, SLP targeting pt's orientation to where he was appropriately using the white board in room today to locate the information. Pt was able to find month, date, year w/o increased cues, but SLP did have to point out place. Also reviewed LT biographical information to where pt was accurate in stating , age today. Only required house number cue to elicit street address and city currently living. Also when reviewing family members names, pt was accurate w/ spouse, 3 children, 2 sisters, " parents and granddtr's name.     Targeting visual problem solving task given 4 pictures which contained errors within the scenes. Pt had increased difficulty in 2 out of the 4 pictures to recognize the errors. Even as SLP provided pt increased probing questions, pt still demonstrating difficulty to ID any of the errors. For the 2 pictures which pt was able to engage in noticing errors, pt still did need mod-max probing questions to [place] errors, to where pt did only notice some items and not all items. At this time, pt will continue to benefit from ongoing skilled SLP services targeting functional cognitive skills in hopes for decreasing caregiver burden over time.    SLP Therapy Minutes   SLP Time In 1230   SLP Time Out 1300   SLP Total Time (minutes) 30   SLP Mode of treatment - Individual (minutes) 30   SLP Mode of treatment - Concurrent (minutes) 0   SLP Mode of treatment - Group (minutes) 0   SLP Mode of treatment - Co-treat (minutes) 0   SLP Mode of Treatment - Total time(minutes) 30 minutes   SLP Cumulative Minutes 450   Therapy Time missed   Time missed? No

## 2025-05-17 NOTE — ASSESSMENT & PLAN NOTE
Lab Results   Component Value Date    HGBA1C 6.6 (H) 02/22/2025   Relatively well-controlled, though remains a risk factor for infection.  -Tight glycemic control, particularly while patient remains on steroid

## 2025-05-17 NOTE — ASSESSMENT & PLAN NOTE
Monitor status post methotrexate  Peak creat was 2.77 on 4/22/25  Previous baseline as OP 5/2023-2/22/25 was 1.1 to 1.0  CMP 5/9/25 with creatinine of 1.18 down from 1.23 on 5/8 and on 5/10 was 1.15  CMP on 5/12/25 showed creatinine stable at 1.1  On 5/15, creat is up to 1.25 but did drop back down to 1.19  Today 5/17- creat up again to 1.38- metformin placed on hold for now. He is being followed by nephrology

## 2025-05-17 NOTE — ASSESSMENT & PLAN NOTE
Staff in room states he was more impulsive overnight last night but today resting calmly in bed  Continue Seroquel  Continue steroid taper  On 1:1

## 2025-05-17 NOTE — ASSESSMENT & PLAN NOTE
Hemoglobin A1C was 6.6 on 2/22/25  Sees David Pagan [facility] in [place]  Home:  Janumet XR  qd  Here: Lispro 6 U TID/Metformin XR 1000 mg qd/Januvia 50mg qd  Restarted Januvia 50 mg qd on 5/14/25 and stopped the Lantus 5/13/25.  Kept Lispro WM same.  Continue DM diet  On QID Accuchecks with SSI Algo 4 = changed to Algo 1  BSs should continue to improve with steroid being tapered (LD will be 5/19)  5/17 creat bumped up to 1.38. will hold metformin for now. Continue SSI

## 2025-05-17 NOTE — SEPSIS NOTE
Sepsis Note   Alexis Dennison 64 y.o. male MRN: [id number]  Unit/Bed#: -01 Encounter: [id number]    Patient flagged for sepsis due to tachycardia, leukopenia and hypotension. Discussed with both oncology and nephrology.  Plan for today was high dose methotrexate but based on above this will not be done today. He was started on a bicarb drip for the methotrexate so at this time the bicarb drip will be discontinued and an order was placed for the sepsis protocol fluid bolus.   Will order UA/culture, blood cultures, procal and lactic.   Clinically, patient is awake and alert and sitting up in his chair eating lunch. Staff does report foul smelling urine this AM so likely urinary source.   Per oncology, they will reevaluate for the methotrexate on monday     Initial Sepsis Screening       Row Name 05/17/25 1128                   Initial Sepsis Assessment    Is the patient's history suggestive of a new or worsening infection? Yes (Proceed)  -LS        Suspected source of infection urinary tract infection  -LS        Indicate SIRS criteria Tachycardia > 90 bpm;Leukocytosis (WBC > 77239 IJL) OR Leukopenia (WBC <4000 IJL) OR Bandemia (WBC >10% bands)  -LS        Are two or more of the above signs & symptoms of infection both present and new to the patient? Yes (Proceed)  -LS           If the answer is yes to both above questions, suspicion of sepsis is present. Proceed with algorithm to determine if severe sepsis or septic shock criteria are met.    Assess for evidence of organ dysfunction: Are any of the below criteria present within 6 hours of suspected infection and SIRS criteria that are NOT considered to be chronic conditions? --                  User Key  (r) = Recorded By, (t) = Taken By, (c) = Cosigned By      Initials Name Provider Type    LS Jesenia Buenrostro PA-C Physician Assistant                         Body mass index is 23.62 kg/m².  Wt Readings from Last 1 Encounters:   05/17/25 74.7 kg (164 lb 9.6 oz)         Ideal body weight: 73 kg (160 lb 15 oz)  Adjusted ideal body weight: 73.7 kg (162 lb 6.4 oz)

## 2025-05-17 NOTE — ASSESSMENT & PLAN NOTE
Patient developing leukopenia again on 5/15, along with new tachycardia.  Lactate is elevated, procalcitonin elevated.  Both lactate and procalcitonin may be elevated in setting of lymphoma which is being treated.  Methotrexate can also lead to lactic acidosis.  Received rituximab on 5/10 which could contribute to leukopenia.  He otherwise is hemodynamically stable and on room air without an obvious source of infection at this time.  Urine smell does not correlate well with infection.  Does have a PICC line in place, placed on 5/1, which could be a source of infection but does not examine infected.  -For now as patient is hemodynamically stable without obvious infection and possible alternative explanations for his leukopenia and elevated procalcitonin/lactate, monitor closely off antibiotics so as to avoid possible toxicities (worsening nephrotoxicity, GI upset, C. difficile infection, antimicrobial resistance)  -Follow-up blood cultures  -Follow-up UA  -Continue to monitor vital signs  -Repeat CBC + diff tomorrow to trend WBC and ANC  -If patient clinically deteriorates can then start IV Cefepime 2g every 12 hours  -Consider checking dopplers to assess for DVT as cause of tachycardia

## 2025-05-17 NOTE — CONSULTS
- Patient was already seen this admission by ID, thus this is not a new consultation and please refer to today's progress note    Les Cervantes MD

## 2025-05-17 NOTE — ASSESSMENT & PLAN NOTE
Baseline creatinine 0.8-1.1.  Peaked on 4/22 at 2.7.  Nephrology suspected prerenal or ATN.  Creatinine has been fluctuating but overall relatively stable.  -Continue to monitor  -Ongoing nephrology recommendations

## 2025-05-17 NOTE — ASSESSMENT & PLAN NOTE
Continue atorvastatin  Did have slightly elevated LFT's on prior labs but most recently normal, consider holding statin if LFTs trend back up

## 2025-05-17 NOTE — PROGRESS NOTES
"Progress Note - Hospitalist   Name: Alexis Dennison 64 y.o. male I MRN: [id number]  Unit/Bed#: -01 I Date of Admission: 5/7/2025   Date of Service: 5/17/2025 I Hospital Day: 10    Assessment & Plan  Primary central nervous system (CNS) lymphoma  Patient with development of worsening confusion, and was seen in the ED on 3/24/2025.  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  Lymphoma/leukemia panel was nondiagnostic  CTH 4/3/25: concerning for worsening hydrocephalus.   Repeat LP 4/7/25 = undiagnostic again   MRI brain 4/11/25: \"Interval enlargement of the dominant left anterior basal ganglionic mass lesion with greater surrounding vasogenic edema and mass effect. 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  S/p EVD, self removed 4/26  S/p Keppra 500mg BID x 7 days for postoperative seizure ppx   Started on chemotherapy during hospital stay and is for weekly Rituximab and Methotrexate every 2 weeks  Continue Dexamethasone taper = LD will be 5/19/25  Maintain PICC line  H-O following  Had Rituxan 5/10/25  Due for Methotrexate on 5/17.  They saw his labs from 5/15/25. They ordered a  urine pH for 5/16; goal is less than 7 so renal started a Bicarb drip and will continue to monitor him for this = consult placed  Type 2 diabetes mellitus with hyperglycemia, without long-term current use of insulin (HCC)  Hemoglobin A1C was 6.6 on 2/22/25  Sees [facility] in " "[place]  Home:  Janumet XR  qd  Here: Lispro 6 U TID/Metformin XR 1000 mg qd/Januvia 50mg qd  Restarted Januvia 50 mg qd on 5/14/25 and stopped the Lantus 5/13/25.  Kept Lispro WM same.  Continue DM diet  On QID Accuchecks with SSI Algo 4 = changed to Algo 1  BSs should continue to improve with steroid being tapered (LD will be 5/19)  5/17 creat bumped up to 1.38. will hold metformin for now. Continue SSI  Mixed hyperlipidemia  Continue atorvastatin  Did have slightly elevated LFT's on prior labs but most recently normal, consider holding statin if LFTs trend back up  Thyroid nodule  TSH/free T4 4/21/25 = 0.019/1.13  Nonemergent outpatient endocrine follow-up.   Will require outpatient TSH, free T4, +/- further imaging with endocrine such as radioactive uptake  Repeat TSH/free T4 done 5/12 = TSH was 0.287 with free T4 0.85  Pulmonary nodule  CT chest 3 months follow-up  Liver lesion  Patient will require outpatient follow-up  Encephalopathy  Staff in room states he was more impulsive overnight last night but today resting calmly in bed  Continue Seroquel  Continue steroid taper  On 1:1  LEYT (acute kidney injury) (HCC)  Monitor status post methotrexate  Peak creat was 2.77 on 4/22/25  Previous baseline as OP 5/2023-2/22/25 was 1.1 to 1.0  CMP 5/9/25 with creatinine of 1.18 down from 1.23 on 5/8 and on 5/10 was 1.15  CMP on 5/12/25 showed creatinine stable at 1.1  On 5/15, creat is up to 1.25 but did drop back down to 1.19  Today 5/17- creat up again to 1.38- metformin placed on hold for now. He is being followed by nephrology  Sepsis (HCC)  Resolved  Continue to monitor off antibiotics  E coli bacteremia  Patient completed 7 days of IV cefazolin which was finished on May 5  Was due to E. Coli UTI  HTN (hypertension)  Home:  Losartan -25 qd  Here:  no meds for now since he is normotensive  Transaminitis  AST is 111, previously 114,  ALT is 322 previously 149  D/w H-O, they are aware =  \"Could be due " "to recent chemotherapy versus antibiotics versus liver lesions\".    CMP 5/9/25 = AST 57 (previously 111),  (previously 322)  As of today 5/17- now resolved  Leukopenia  WBC was stable at 5.9 on 5/12/25 but 5/15/25 is down to 3.3  H-O following  CBC 5/19/25  Hyponatremia    resolved  Thrombocytopenia (HCC)  Platelet count down to 117 from 160 on 5/12/25 and on 5/15/25  is 115  ?from chemo  Per H-O  CBC 5/19/25  Anemia  Hemoglobin stable at 9.0  CBC 5/19/25    The above assessment and plan was reviewed and updated as determined by my evaluation of the patient on 5/17/2025.    History of Present Illness   Patient seen and examined. Patients overnight issues or events were reviewed with nursing staff. New or overnight issues include the following:   Patient resting in bed. Remains on 1:1. Staff states he was more impulsive last night and instead of asking for help he would just try to get up to go to the bathroom. He also tried to get up early this morning and told staff he was \"going to get eggs'    Review of Systems    Objective :  Temp:  [97.6 °F (36.4 °C)-98.8 °F (37.1 °C)] 98.8 °F (37.1 °C)  HR:  [] 104  BP: (100-111)/(60-75) 100/60  Resp:  [18] 18  SpO2:  [96 %-97 %] 96 %  O2 Device: None (Room air)    Invasive Devices       Peripherally Inserted Central Catheter Line  Duration             PICC Line 05/01/25 Left Brachial 15 days              Drain  Duration             External Urinary Catheter 14 days                    Physical Exam  General Appearance: NAD; pleasant  HEENT: PERRLA, conjuctiva normal; mucous membranes moist; face symmetrical  Neck:  Supple  Lungs: clear bilaterally, normal respiratory effort, no retractions, expiratory effort normal, on room air  CV: regular rate and rhythm, no murmurs rubs or gallops noted   ABD: soft non tender, +BS x4  EXT: DP pulses intact, no lymphadenopathy, no edema  Skin: normal turgor, normal texture, no rash  Psych: affect flat  Neuro: confused  The above " physical exam was reviewed and updated as determined by my evaluation of the patient on 5/17/2025.      Lab Results: I have reviewed the following results:  Results from last 7 days   Lab Units 05/15/25  0600 05/12/25  0614   WBC Thousand/uL 3.35* 5.16   HEMOGLOBIN g/dL 9.0* 9.3*   HEMATOCRIT % 26.1* 26.4*   PLATELETS Thousands/uL 115* 117*     Results from last 7 days   Lab Units 05/17/25  0537 05/16/25  0618   SODIUM mmol/L 136 135   POTASSIUM mmol/L 3.8 4.2   CHLORIDE mmol/L 92* 98   CO2 mmol/L 31 29   BUN mg/dL 25 27*   CREATININE mg/dL 1.38* 1.19   CALCIUM mg/dL 8.3* 8.8             Results from last 7 days   Lab Units 05/17/25  0611 05/16/25  2115 05/16/25  1549   POC GLUCOSE mg/dl 154* 97 130           Review of Scheduled Meds: Medications  reviewed and reconciled as needed  Current Facility-Administered Medications   Medication Dose Route Frequency Provider Last Rate    acetaminophen  650 mg Oral Q6H PRN Crispin Arana MD      allopurinol  300 mg Oral Daily Crispin Arana MD      alteplase  2 mg Intracatheter Q1MIN PRN Crispin Arana MD      alteplase  2 mg Intracatheter Q1MIN PRN Magali Lee MD      aluminum-magnesium hydroxide-simethicone  30 mL Oral Q4H PRN Crispin Arana MD      atorvastatin  20 mg Oral Daily Crispin Arana MD      bisacodyl  10 mg Rectal Daily PRN Skyler Hendrickson MD      calcium carbonate  1,000 mg Oral Daily PRN Crispin Arana MD      chlorhexidine  15 mL Mouth/Throat Q12H [facility] Crispin Arana MD      dexamethasone  1 mg Oral Q12H [facility] Crispin Arana MD      docusate sodium  100 mg Oral BID Jessica Arreola DO      heparin (porcine)  5,000 Units Subcutaneous Q8H CAIT Arana MD      insulin lispro  1-5 Units Subcutaneous TID AC PATI Porras      insulin lispro  1-5 Units Subcutaneous HS PATI Porras      insulin lispro  6 Units Subcutaneous TID With Meals PATI Porras      lactulose  20 g Oral Daily PRN Joshua Kaminski DO       melatonin  6 mg Oral HS Crispin Arana MD      [Held by provider] metFORMIN  1,000 mg Oral Daily Crispin Arana MD      OLANZapine  5 mg Intramuscular BID PRN Crispin Arana MD      ondansetron  4 mg Intravenous Q6H PRN Crispin Arana MD      oxyCODONE  2.5 mg Oral Q4H PRN Crispin Arana MD      Or    oxyCODONE  5 mg Oral Q4H PRN Crispin Arana MD      pantoprazole  40 mg Oral Early Morning Crispin Arana MD      polyethylene glycol  17 g Oral Daily PRN Skyler Hendrickson MD      QUEtiapine  12.5 mg Oral Daily Crispin Arana MD      QUEtiapine  50 mg Oral HS Crispin Arana MD      senna  2 tablet Oral Daily With Lunch Jessica Arreola DO      sitaGLIPtin  50 mg Oral Daily PATI Porras      sodium bicarbonate 150 mEq in sterile water 1,000 mL infusion   Intravenous Continuous Kuldip Andi Medina  mL/hr at 05/16/25 2222    sodium chloride  20 mL/hr Intravenous Once PRN Crispin Arana MD      sodium chloride  20 mL/hr Intravenous Once PRN Magali Lee MD         VTE Pharmacologic Prophylaxis: heparin  Code Status: Level 1 - Full Code  Current Length of Stay: 10 day(s)    Administrative Statements     ** Please Note:  voice to text software may have been used in the creation of this document. Although proof errors in transcription or interpretation are a potential of such software**

## 2025-05-17 NOTE — ASSESSMENT & PLAN NOTE
"AST is 111, previously 114,  ALT is 322 previously 149  D/w H-O, they are aware =  \"Could be due to recent chemotherapy versus antibiotics versus liver lesions\".    CMP 5/9/25 = AST 57 (previously 111),  (previously 322)  As of today 5/17- now resolved  "

## 2025-05-17 NOTE — ASSESSMENT & PLAN NOTE
Baseline creatinine 0.8 to 1.1.   Etiology suspected to be ATN +/- prerenal azotemia.   Peak SCr 2.77 on 4/22/25.   Current creatinine fairly stable at 1.3 may be an element of prerenal azotemia.

## 2025-05-17 NOTE — ASSESSMENT & PLAN NOTE
TSH/free T4 4/21/25 = 0.019/1.13  Nonemergent outpatient endocrine follow-up.   Will require outpatient TSH, free T4, +/- further imaging with endocrine such as radioactive uptake  Repeat TSH/free T4 done 5/12 = TSH was 0.287 with free T4 0.85

## 2025-05-17 NOTE — ASSESSMENT & PLAN NOTE
Hematology is following.   Needs high dose methotrexate -currently on hold given suspected sepsis.  Blood cultures and urine culture are pending.  Will need urinary alkalinization with methotrexate administration.   Previously on sodium bicarb infusion for now we will switch to Isolyte  Goal urine pH is 7.0 or above.

## 2025-05-17 NOTE — ASSESSMENT & PLAN NOTE
Patient previously leukopenic in setting of lymphoma and chemotherapy.  He has had only 1 normal WBC count in the last 3 weeks.  Developing increasing leukopenia since 5/15, though did receive rituximab on 5/10 which may be the cause.  ANC was 1.9 on 5/15.  -Continue to monitor CBCD  -Follow up infectious workup as above

## 2025-05-17 NOTE — ASSESSMENT & PLAN NOTE
64 yoM with PMHx of DM2, NAFLD, and HT who presented with progressive encephalopathy found to have multiple scattered nodular cerebral and cerebellar enhancement who underwent two non-diagnostic LPs prompting stereotactic brain biopsy (4/14) which showed large B-cell lymphoma. See oncology progress note on 4/21 for full diagnostic work up. On 4/29, agitation led to infectious work up with showed UA+ and BCx with 2/2 E. Coli sensitive to ceftriaxone. ID was consulted is managing and has approved to restart chemo 5/2.      Treatment Plan: regimen modified from (https://pubmed.ncbi.nlm.nih.gov/95849884/)     High-dose methotrexate every 2 weeks x4 cycles followed by every 4 weeks maintenance (could consider dosing every 4 weeks if he discharges to outside rehab)  C1 (4/17) 8 g/m²  C2 (5/2) 3 g/m², dose-reduced due to LETY, delayed x1 day due to E. coli bacteremia   Urine pH remained above 7.0.  Methotrexate level 24 hours post high-dose methotrexate, from 5/3/2025 was less than 0.10. Repeat levels on 5/5/25 at 0.36. Leucovorin, bicarb gtt was restarted.  Methotrexate level from this morning less than 0.10, no need to further monitor methotrexate level or urine pH.  Status post Rituxan on 5/8/2025.  Patient is due for his cycle 3 on 5/17/2025 with high-dose methotrexate.  Patient was started on bicarb gtt. on 5/16 in anticipation of starting methotrexate on 5/17/2025.  Will hold his methotrexate today due to labs remarkable for worsening leukopenia, WBC count 1.26, hemoglobin 8.5, worsening thrombocytopenia with platelet count 85K.  Per discussion with aide at bedside patient was noted with increased frequency and strong urine smell, recommend infectious workup.  Will order Granix x 2.  Will reevaluate on Monday for Granix.

## 2025-05-17 NOTE — ASSESSMENT & PLAN NOTE
Multifactorial due to underlying CNS lymphoma, medications, prolonged hospitalization in elderly patient, steroid use.  - Continue to monitor

## 2025-05-17 NOTE — PROGRESS NOTES
Progress Note - Oncology-Medical   Name: Alexis Dennison 64 y.o. male I MRN: [id number]  Unit/Bed#: -01 I Date of Admission: 5/7/2025   Date of Service: 5/17/2025 I Hospital Day: 10     Assessment & Plan  Primary central nervous system (CNS) lymphoma  64 yoM with PMHx of DM2, NAFLD, and HT who presented with progressive encephalopathy found to have multiple scattered nodular cerebral and cerebellar enhancement who underwent two non-diagnostic LPs prompting stereotactic brain biopsy (4/14) which showed large B-cell lymphoma. See oncology progress note on 4/21 for full diagnostic work up. On 4/29, agitation led to infectious work up with showed UA+ and BCx with 2/2 E. Coli sensitive to ceftriaxone. ID was consulted is managing and has approved to restart chemo 5/2.      Treatment Plan: regimen modified from (https://pubmed.ncbi.nlm.nih.gov/92848471/)     High-dose methotrexate every 2 weeks x4 cycles followed by every 4 weeks maintenance (could consider dosing every 4 weeks if he discharges to outside rehab)  C1 (4/17) 8 g/m²  C2 (5/2) 3 g/m², dose-reduced due to LETY, delayed x1 day due to E. coli bacteremia   Urine pH remained above 7.0.  Methotrexate level 24 hours post high-dose methotrexate, from 5/3/2025 was less than 0.10. Repeat levels on 5/5/25 at 0.36. Leucovorin, bicarb gtt was restarted.  Methotrexate level from this morning less than 0.10, no need to further monitor methotrexate level or urine pH.  Status post Rituxan on 5/8/2025.  Patient is due for his cycle 3 on 5/17/2025 with high-dose methotrexate.  Patient was started on bicarb gtt. on 5/16 in anticipation of starting methotrexate on 5/17/2025.  Will hold his methotrexate today due to labs remarkable for worsening leukopenia, WBC count 1.26, hemoglobin 8.5, worsening thrombocytopenia with platelet count 85K.  Per discussion with aide at bedside patient was noted with increased frequency and strong urine smell, recommend infectious workup.  Will  order Granix x 2.  Will reevaluate on Monday for Granix.  Thyroid nodule  Patient incidentally found to have thyroid nodule, was recommended ultrasound thyroid.  Pulmonary nodule  CTA from 3/29/2025 with nonspecific 4 mm right lower lobe pulmonary nodule, recommended 3 months follow-up.  Transaminitis  Patient noted with transaminitis, LFTs has been fluctuating, more recently this morning noted , .  Could be due to recent chemotherapy versus antibiotics versus liver lesions.  Repeat CMP showed improving AST now within normal limit, ALT trended down from 322 to 187.  Hyponatremia  Nephrology on board  Thrombocytopenia (HCC)  Patient does have thrombocytopenia at baseline but usually his platelet counts have been above 100 K,  This morning noted with worsening thrombocytopenia, suspected underlying infectious etiology.  Continue to monitor CBC daily, will need platelet transfusion if counts drop below 20K or in setting of any spontaneous bleeding.  Anemia  Likely multifactorial, hemoglobin has been in range of 9.  Will continue to monitor.    Outpatient follow up plan: Patient has outpatient appointment with Dr. Chi on 5/23/25.    Communication with patient/family:  I did update the patient, updated plan to patient's daughter Elizabeth over phone.    Communication with team:  Did communicate with , primary team.    I did review this patient with Dr. Kebede and they are in agreement with this plan.          Subjective:  Patient seen at bedside, reported increased urinary frequency and strong smelling urine.  Vitals this morning noted with tachycardia and hypotension with blood pressure 80/61.  Labs with leukopenia with WBC count of 1.26 and worsening thrombocytopenia.  Recommend infectious workup, will hold chemotherapy and reevaluate on Monday.      Review of Systems   Constitutional:  Negative for appetite change and unexpected weight change.   Respiratory:  Negative for chest tightness and shortness  of breath.    Cardiovascular:  Negative for chest pain and palpitations.   Gastrointestinal:  Negative for abdominal pain and blood in stool.   Genitourinary:  Positive for frequency.   Musculoskeletal:  Negative for back pain and myalgias.   All other systems reviewed and are negative.         Objective:     Medication Administration - last 24 hours from 05/16/2025 0943 to 05/17/2025 0943         Date/Time Order Dose Route Action Action by     05/17/2025 0831 EDT atorvastatin (LIPITOR) tablet 20 mg 20 mg Oral Given Ketan Rehman RN     05/17/2025 0830 EDT chlorhexidine (PERIDEX) 0.12 % oral rinse 15 mL 15 mL Mouth/Throat Given Ketan Rehman RN     05/16/2025 2114 EDT chlorhexidine (PERIDEX) 0.12 % oral rinse 15 mL 15 mL Mouth/Throat Given Lani Jamison RN     05/17/2025 0539 EDT pantoprazole (PROTONIX) EC tablet 40 mg 40 mg Oral Given Lani Jamison RN     05/17/2025 0830 EDT allopurinol (ZYLOPRIM) tablet 300 mg 300 mg Oral Given Ketan Rehman RN     05/17/2025 0539 EDT heparin (porcine) subcutaneous injection 5,000 Units 5,000 Units Subcutaneous Given Lani Jamison RN     05/16/2025 2114 EDT heparin (porcine) subcutaneous injection 5,000 Units 5,000 Units Subcutaneous Given Lani Jamison RN     05/16/2025 1404 EDT heparin (porcine) subcutaneous injection 5,000 Units 5,000 Units Subcutaneous Given Johanna Schmid RN     05/16/2025 2118 EDT melatonin tablet 6 mg 6 mg Oral Given Lani Jamison RN     05/16/2025 1124 EDT QUEtiapine (SEROquel) tablet 12.5 mg 12.5 mg Oral Given Johanna Schmid RN     05/16/2025 2115 EDT QUEtiapine (SEROquel) tablet 50 mg 50 mg Oral Given Lani Jamison RN     05/22/2025 0900 EDT metFORMIN (GLUCOPHAGE-XR) 24 hr tablet 1,000 mg -- Oral Held Dose Jesenia Buenrostro PA-C     05/21/2025 0900 EDT metFORMIN (GLUCOPHAGE-XR) 24 hr tablet 1,000 mg -- Oral Held Dose MARLO Crisostomo-JULIANNA     05/20/2025 0900 EDT metFORMIN (GLUCOPHAGE-XR) 24 hr tablet 1,000 mg  -- Oral Held Dose Jesenia Buenrostro PA-C     05/19/2025 0900 EDT metFORMIN (GLUCOPHAGE-XR) 24 hr tablet 1,000 mg -- Oral Held Dose Jesenia Buenrostro PA-C     05/18/2025 0900 EDT metFORMIN (GLUCOPHAGE-XR) 24 hr tablet 1,000 mg -- Oral Held Dose Jesenia Buenrostro PA-C     05/17/2025 0922 EDT metFORMIN (GLUCOPHAGE-XR) 24 hr tablet 1,000 mg -- Oral Held by provider Jesenia Buenrostro PA-C     05/17/2025 0829 EDT metFORMIN (GLUCOPHAGE-XR) 24 hr tablet 1,000 mg 1,000 mg Oral Given Ketan Rehman RN     05/17/2025 0830 EDT dexamethasone (DECADRON) tablet 1 mg 1 mg Oral Given Ketan Rehman RN     05/16/2025 2115 EDT dexamethasone (DECADRON) tablet 1 mg 1 mg Oral Given Lani Jamison RN     05/17/2025 0831 EDT docusate sodium (COLACE) capsule 100 mg 100 mg Oral Given Ketan Rehman RN     05/16/2025 1800 EDT docusate sodium (COLACE) capsule 100 mg -- Oral Canceled Entry Johanna Schmid, ADELINE     05/16/2025 1657 EDT docusate sodium (COLACE) capsule 100 mg 100 mg Oral Given Johanna Schmid RN     05/16/2025 1124 EDT senna (SENOKOT) tablet 17.2 mg 17.2 mg Oral Given Johanna Schmid RN     05/17/2025 0829 EDT insulin lispro (HumALOG/ADMELOG) 100 units/mL subcutaneous injection 6 Units 6 Units Subcutaneous Given Ketan Rehman RN     05/16/2025 1656 EDT insulin lispro (HumALOG/ADMELOG) 100 units/mL subcutaneous injection 6 Units 6 Units Subcutaneous Given Johanna Schmid RN     05/16/2025 1126 EDT insulin lispro (HumALOG/ADMELOG) 100 units/mL subcutaneous injection 6 Units 6 Units Subcutaneous Given Johanna Schmid RN     05/17/2025 0830 EDT sitaGLIPtin (JANUVIA) tablet 50 mg 50 mg Oral Given Ketan Rehman, ADELINE     05/16/2025 2222 EDT sodium bicarbonate 150 mEq in sterile water 1,000 mL infusion -- Intravenous New Bag Lani Jamison RN     05/16/2025 2221 EDT sodium bicarbonate 150 mEq in sterile water 1,000 mL infusion -- Intravenous Stopped Lani Jamison RN     05/16/2025 1018 EDT sodium  "bicarbonate 150 mEq in sterile water 1,000 mL infusion -- Intravenous New Bag Johanna Schmid RN     05/17/2025 0828 EDT insulin lispro (HumALOG/ADMELOG) 100 units/mL subcutaneous injection 1-5 Units 1 Units Subcutaneous Given Ketan Rehman, ADELINE     05/16/2025 1626 EDT insulin lispro (HumALOG/ADMELOG) 100 units/mL subcutaneous injection 1-5 Units -- Subcutaneous Not Given Johanna Schmid RN     05/16/2025 1126 EDT insulin lispro (HumALOG/ADMELOG) 100 units/mL subcutaneous injection 1-5 Units 1 Units Subcutaneous Given Johanna Schmid RN     05/16/2025 2120 EDT insulin lispro (HumALOG/ADMELOG) 100 units/mL subcutaneous injection 1-5 Units -- Subcutaneous Not Given Lani Jamison RN            /60 (BP Location: Right arm)   Pulse 104   Temp 98.8 °F (37.1 °C) (Oral)   Resp 18   Ht 5' 10\" (1.778 m)   Wt 74.7 kg (164 lb 9.6 oz)   SpO2 96%   BMI 23.62 kg/m²       Physical Exam  Constitutional:       Appearance: Normal appearance.     Cardiovascular:      Rate and Rhythm: Normal rate and regular rhythm.      Pulses: Normal pulses.   Pulmonary:      Effort: Pulmonary effort is normal.      Breath sounds: Normal breath sounds.   Abdominal:      General: Abdomen is flat.      Palpations: Abdomen is soft.     Skin:     General: Skin is warm and dry.     Neurological:      Mental Status: He is alert.           Recent Results (from the past 48 hours)   Fingerstick Glucose (POCT)    Collection Time: 05/15/25 10:49 AM   Result Value Ref Range    POC Glucose 212 (H) 65 - 140 mg/dl   Fingerstick Glucose (POCT)    Collection Time: 05/15/25  3:56 PM   Result Value Ref Range    POC Glucose 161 (H) 65 - 140 mg/dl   Fingerstick Glucose (POCT)    Collection Time: 05/15/25  9:13 PM   Result Value Ref Range    POC Glucose 114 65 - 140 mg/dl   Urinalysis with microscopic    Collection Time: 05/16/25 12:56 AM   Result Value Ref Range    Color, UA Light Yellow     Clarity, UA Turbid     Specific Gravity, UA " 1.013 1.003 - 1.030    pH, UA 6.5 4.5, 5.0, 5.5, 6.0, 6.5, 7.0, 7.5, 8.0    Leukocytes, UA Large (A) Negative    Nitrite, UA Positive (A) Negative    Protein, UA Negative Negative mg/dl    Glucose, UA Negative Negative mg/dl    Ketones, UA Negative Negative mg/dl    Urobilinogen, UA <2.0 <2.0 mg/dl mg/dl    Bilirubin, UA Negative Negative    Occult Blood, UA Negative Negative    RBC, UA None Seen None Seen, 1-2 /hpf    WBC, UA Innumerable (A) None Seen, 1-2 /hpf    Epithelial Cells None Seen None Seen, Occasional /hpf    Bacteria, UA Innumerable (A) None Seen, Occasional /hpf    Amorphous Crystals, UA Occasional    Basic metabolic panel    Collection Time: 05/16/25  6:18 AM   Result Value Ref Range    Sodium 135 135 - 147 mmol/L    Potassium 4.2 3.5 - 5.3 mmol/L    Chloride 98 96 - 108 mmol/L    CO2 29 21 - 32 mmol/L    ANION GAP 8 4 - 13 mmol/L    BUN 27 (H) 5 - 25 mg/dL    Creatinine 1.19 0.60 - 1.30 mg/dL    Glucose 131 65 - 140 mg/dL    Glucose, Fasting 131 (H) 65 - 99 mg/dL    Calcium 8.8 8.4 - 10.2 mg/dL    eGFR 63 ml/min/1.73sq m   Fingerstick Glucose (POCT)    Collection Time: 05/16/25  6:22 AM   Result Value Ref Range    POC Glucose 143 (H) 65 - 140 mg/dl   Fingerstick Glucose (POCT)    Collection Time: 05/16/25 10:08 AM   Result Value Ref Range    POC Glucose 161 (H) 65 - 140 mg/dl   Fingerstick Glucose (POCT)    Collection Time: 05/16/25  3:49 PM   Result Value Ref Range    POC Glucose 130 65 - 140 mg/dl   Fingerstick Glucose (POCT)    Collection Time: 05/16/25  9:15 PM   Result Value Ref Range    POC Glucose 97 65 - 140 mg/dl   Urinalysis with microscopic    Collection Time: 05/16/25  9:26 PM   Result Value Ref Range    Color, UA Light Yellow     Clarity, UA Turbid     Specific Gravity, UA 1.009 1.003 - 1.030    pH, UA 7.5 4.5, 5.0, 5.5, 6.0, 6.5, 7.0, 7.5, 8.0    Leukocytes, UA Large (A) Negative    Nitrite, UA Negative Negative    Protein, UA Negative Negative mg/dl    Glucose, UA Negative Negative  mg/dl    Ketones, UA Negative Negative mg/dl    Urobilinogen, UA <2.0 <2.0 mg/dl mg/dl    Bilirubin, UA Negative Negative    Occult Blood, UA Trace (A) Negative    RBC, UA 1-2 None Seen, 1-2 /hpf    WBC, UA Innumerable (A) None Seen, 1-2 /hpf    Epithelial Cells None Seen None Seen, Occasional /hpf    Bacteria, UA Occasional None Seen, Occasional /hpf    Budding Yeast Present    Comprehensive metabolic panel    Collection Time: 05/17/25  5:37 AM   Result Value Ref Range    Sodium 136 135 - 147 mmol/L    Potassium 3.8 3.5 - 5.3 mmol/L    Chloride 92 (L) 96 - 108 mmol/L    CO2 31 21 - 32 mmol/L    ANION GAP 13 4 - 13 mmol/L    BUN 25 5 - 25 mg/dL    Creatinine 1.38 (H) 0.60 - 1.30 mg/dL    Glucose 159 (H) 65 - 140 mg/dL    Glucose, Fasting 159 (H) 65 - 99 mg/dL    Calcium 8.3 (L) 8.4 - 10.2 mg/dL    Corrected Calcium 8.8 8.3 - 10.1 mg/dL    AST 17 13 - 39 U/L    ALT 44 7 - 52 U/L    Alkaline Phosphatase 66 34 - 104 U/L    Total Protein 5.5 (L) 6.4 - 8.4 g/dL    Albumin 3.4 (L) 3.5 - 5.0 g/dL    Total Bilirubin 0.84 0.20 - 1.00 mg/dL    eGFR 53 ml/min/1.73sq m   Fingerstick Glucose (POCT)    Collection Time: 05/17/25  6:11 AM   Result Value Ref Range    POC Glucose 154 (H) 65 - 140 mg/dl       CT abdomen pelvis wo contrast  Result Date: 4/30/2025  Narrative: CT ABDOMEN AND PELVIS WITHOUT IV CONTRAST INDICATION: Gram-negative bacteremia, evaluate for source infection. COMPARISON: None. TECHNIQUE: CT examination of the abdomen and pelvis was performed without intravenous contrast. Multiplanar 2D reformatted images were created from the source data. This examination, like all CT scans performed in the [facility], was performed utilizing techniques to minimize radiation dose exposure, including the use of iterative reconstruction and automated exposure control. Radiation dose length product (DLP) for this visit: 560.94 mGy-cm. Enteric Contrast: Not administered. FINDINGS: ABDOMEN LOWER CHEST: Calcified  granuloma in the right middle lobe. LIVER/BILIARY TREE: 1 cm cyst in the right lobe. Liver is otherwise unremarkable.1 GALLBLADDER: No calcified gallstones. No pericholecystic inflammatory change. SPLEEN: Unremarkable. PANCREAS: Unremarkable. ADRENAL GLANDS: Unremarkable. KIDNEYS/URETERS: Unremarkable. No hydronephrosis. STOMACH AND BOWEL: Large amount of formed stool throughout the entire colon. No evidence of obstruction. APPENDIX: Normal. ABDOMINOPELVIC CAVITY: No ascites. No pneumoperitoneum. No lymphadenopathy. VESSELS: Unremarkable for patient's age. PELVIS REPRODUCTIVE ORGANS: Unremarkable for patient's age. URINARY BLADDER: Unremarkable. ABDOMINAL WALL/INGUINAL REGIONS: Mild infiltration of the anterior abdominal wall in the left lower quadrant, possibly a soft tissue contusion. BONES: No acute fracture or suspicious osseous lesion.     Impression: No acute inflammatory process in the abdomen or pelvis. Large amount of stool throughout the colon. Workstation performed: [id number]     XR abdomen obstruction series  Result Date: 4/30/2025  Narrative: XR ABDOMEN OBSTRUCTION SERIES INDICATION: Constipation, lethargy, gram-negative bacteremia. Confusion, newly diagnosed intracranial mass. Constipation. COMPARISON: MRI abdomen 3/30/2025 and CT chest abdomen pelvis 3/29/2025 FINDINGS: Large volume of colonic stool. Otherwise unremarkable bowel gas pattern. No pneumoperitoneum or abnormal air-fluid levels. No pathologic calcification or soft tissue mass. Bones are unremarkable for patient's age. Examination of the chest reveals clear lungs and a normal cardiomediastinal silhouette. Right PICC with tip projecting over the superior cavoatrial junction.     Impression: 1.  Large colonic stool burden without obstruction or other acute abdominal findings. 2.  No acute cardiopulmonary findings. Resident: CINTHYA FAJARDO I, the attending radiologist, have reviewed the images and agree with the final report above.  Workstation performed: [id number]     XR chest portable ICU  Result Date: 4/21/2025  Narrative: XR CHEST PORTABLE ICU INDICATION: ETT placement. COMPARISON: CXR 4/13/2025, chest CT 3/29/2025. FINDINGS: ET tube 3 cm above the arnav. Right PICC in upper right atrium. Clear lungs. No pneumothorax or pleural effusion. Normal cardiomediastinal silhouette. Bones are unremarkable for age. Normal upper abdomen.     Impression: No acute cardiopulmonary disease. ET tube 3 cm above the arnav. Workstation performed: [id number]     CT head wo contrast  Result Date: 4/21/2025  Narrative: CT BRAIN - WITHOUT CONTRAST INDICATION:   s/p EVD removal follow up hydrocephalus. COMPARISON: 4/20/2025 TECHNIQUE:  CT examination of the brain was performed.  Multiplanar 2D reformatted images were created from the source data. Radiation dose length product (DLP) for this visit:  1099 mGy-cm .  This examination, like all CT scans performed [facility] the [facility], was performed utilizing techniques to minimize radiation dose exposure, including the use of iterative reconstruction and automated exposure control. IMAGE QUALITY:  Diagnostic. FINDINGS: PARENCHYMA: Ill-defined mixed density in the left gangliocapsular region corresponds to the lesion seen on prior MRI. Again noted is linear low-attenuation and trace blood products along the ventricular catheter tract, unchanged. VENTRICLES AND EXTRA-AXIAL SPACES: There is increasing ventricular caliber in the interim which is most pronounced involving the left lateral ventricle. There is no evidence of an acute, decompensated hydrocephalus. Small volume blood products in the occipital horns are decreasing. There is peripheral high attenuation along ventricular system which may correspond to subependymal spread of neoplasm. There is no hydrocephalus. High attenuation near the foramen of De Oliveira on the left is unchanged compared to preoperative exam and corresponds to known choroid plexus  mass. VISUALIZED ORBITS: Normal visualized orbits. PARANASAL SINUSES: Normal visualized paranasal sinuses. CALVARIUM AND EXTRACRANIAL SOFT TISSUES: Left frontal siomara hole     Impression: 1. Increasing ventricular caliber without evidence of an acute, decompensated hydrocephalus. Recommend continued close follow-up. 2. Improving intraventricular blood products. The study was marked in EPIC for immediate notification. Workstation performed: [id number]     MRI cervical spine w wo contrast  Result Date: 4/20/2025  Narrative: MRI CERVICAL, THORACIC AND LUMBAR SPINE WITH AND WITHOUT CONTRAST INDICATION: new b cell lymphoma. COMPARISON:  None. TECHNIQUE:  Multiplanar, multisequence imaging of the total spine was performed before and after gadolinium administration. . IV Contrast:  7 mL of Gadobutrol injection (SINGLE-DOSE) IMAGE QUALITY: Motion-degraded, which reduces diagnostic sensitivity. FINDINGS: ALIGNMENT: Normal alignment of the cervical spine. No acute fracture. Multilevel endplate centered marrow changes. Scattered areas of intrinsic T1/T2 hyperintensity in the vertebral bodies, most consistent with osseous venous malformations/hemangiomas. MARROW SIGNAL: As above. CORD: No cord signal abnormality that can be confirmed in 2 planes. PREVERTEBRAL AND PARASPINAL SOFT TISSUES: No acute abnormality. VISUALIZED POSTERIOR FOSSA: Please refer to concurrent brain MRI. CERVICAL DISC SPACES: Multilevel mild degenerative disc disease. C2-C3: Disc osteophyte complex. No significant neuroforaminal narrowing. Mild spinal canal stenosis. C3-C4: Disc osteophyte complex. Uncovertebral and facet arthropathy contribute to marked bilateral neuroforaminal narrowing, left greater than right. Moderate spinal canal stenosis. C4-C5: Disc osteophyte complex. Uncovertebral and facet arthropathy contribute to mild-moderate bilateral neuroforaminal narrowing. Mild spinal canal stenosis. C5-C6: Disc osteophyte complex. Uncovertebral and facet  arthropathy contribute to moderate-marked bilateral neuroforaminal narrowing. Moderate spinal canal stenosis. C6-C7: Disc osteophyte complex. Uncovertebral and facet arthropathy contribute to marked left with moderate right neuroforaminal narrowing. Mild-moderate spinal canal stenosis. C7-T1: No significant neuroforaminal narrowing or spinal canal stenosis. THORACIC DEGENERATIVE CHANGE: Mild spondylotic changes without high-grade neuroforaminal narrowing or spinal canal stenosis. LUMBAR DISC SPACES: Multilevel mild degenerative disc disease. L1-L2: No significant neuroforaminal narrowing or spinal canal stenosis. L2-L3: No significant neuroforaminal narrowing or spinal canal stenosis. L3-L4: Disc bulge. No significant neuroforaminal narrowing. Mild spinal canal stenosis. L4-L5: Disc bulge, noting abutment of the bilateral traversing L5 nerve roots. Mild bilateral neuroforaminal narrowing. Mild spinal canal stenosis. L5-S1: Disc bulge with superimposed central disc herniation, noting abutment of the right greater than left traversing S1 nerve roots and mild-moderate central spinal canal stenosis. Mild bilateral neuroforaminal narrowing. POSTCONTRAST IMAGING: No abnormal enhancement. OTHER FINDINGS: Retropharyngeal course of the right carotid artery. Multiple large left thyroid nodules measuring greater than 1.5 cm. Scattered T2 hyperintensities in the liver, statistically cyst versus hemangioma. Small amount of debris is noted within the tracheobronchial tree. Patchy atelectasis with small bilateral pleural effusions in the partially visualized lungs. Partially visualized enteric and endotracheal tubes.     Impression: 1.  No evidence of lymphomatous involvement of the spine. 2.  Multilevel spondylotic changes, as detailed above. See narrative above for full details. 3.  Multiple large left thyroid nodules, for which further evaluation with thyroid sonogram is recommended if not previously performed. The study was  marked in EPIC for immediate notification. Workstation performed: [id number]     MRI lumbar spine w wo contrast  Result Date: 4/20/2025  Narrative: MRI CERVICAL, THORACIC AND LUMBAR SPINE WITH AND WITHOUT CONTRAST INDICATION: new b cell lymphoma. COMPARISON:  None. TECHNIQUE:  Multiplanar, multisequence imaging of the total spine was performed before and after gadolinium administration. . IV Contrast:  7 mL of Gadobutrol injection (SINGLE-DOSE) IMAGE QUALITY: Motion-degraded, which reduces diagnostic sensitivity. FINDINGS: ALIGNMENT: Normal alignment of the cervical spine. No acute fracture. Multilevel endplate centered marrow changes. Scattered areas of intrinsic T1/T2 hyperintensity in the vertebral bodies, most consistent with osseous venous malformations/hemangiomas. MARROW SIGNAL: As above. CORD: No cord signal abnormality that can be confirmed in 2 planes. PREVERTEBRAL AND PARASPINAL SOFT TISSUES: No acute abnormality. VISUALIZED POSTERIOR FOSSA: Please refer to concurrent brain MRI. CERVICAL DISC SPACES: Multilevel mild degenerative disc disease. C2-C3: Disc osteophyte complex. No significant neuroforaminal narrowing. Mild spinal canal stenosis. C3-C4: Disc osteophyte complex. Uncovertebral and facet arthropathy contribute to marked bilateral neuroforaminal narrowing, left greater than right. Moderate spinal canal stenosis. C4-C5: Disc osteophyte complex. Uncovertebral and facet arthropathy contribute to mild-moderate bilateral neuroforaminal narrowing. Mild spinal canal stenosis. C5-C6: Disc osteophyte complex. Uncovertebral and facet arthropathy contribute to moderate-marked bilateral neuroforaminal narrowing. Moderate spinal canal stenosis. C6-C7: Disc osteophyte complex. Uncovertebral and facet arthropathy contribute to marked left with moderate right neuroforaminal narrowing. Mild-moderate spinal canal stenosis. C7-T1: No significant neuroforaminal narrowing or spinal canal stenosis. THORACIC DEGENERATIVE  CHANGE: Mild spondylotic changes without high-grade neuroforaminal narrowing or spinal canal stenosis. LUMBAR DISC SPACES: Multilevel mild degenerative disc disease. L1-L2: No significant neuroforaminal narrowing or spinal canal stenosis. L2-L3: No significant neuroforaminal narrowing or spinal canal stenosis. L3-L4: Disc bulge. No significant neuroforaminal narrowing. Mild spinal canal stenosis. L4-L5: Disc bulge, noting abutment of the bilateral traversing L5 nerve roots. Mild bilateral neuroforaminal narrowing. Mild spinal canal stenosis. L5-S1: Disc bulge with superimposed central disc herniation, noting abutment of the right greater than left traversing S1 nerve roots and mild-moderate central spinal canal stenosis. Mild bilateral neuroforaminal narrowing. POSTCONTRAST IMAGING: No abnormal enhancement. OTHER FINDINGS: Retropharyngeal course of the right carotid artery. Multiple large left thyroid nodules measuring greater than 1.5 cm. Scattered T2 hyperintensities in the liver, statistically cyst versus hemangioma. Small amount of debris is noted within the tracheobronchial tree. Patchy atelectasis with small bilateral pleural effusions in the partially visualized lungs. Partially visualized enteric and endotracheal tubes.     Impression: 1.  No evidence of lymphomatous involvement of the spine. 2.  Multilevel spondylotic changes, as detailed above. See narrative above for full details. 3.  Multiple large left thyroid nodules, for which further evaluation with thyroid sonogram is recommended if not previously performed. The study was marked in EPIC for immediate notification. Workstation performed: [id number]     MRI thoracic spine w wo contrast  Result Date: 4/20/2025  Narrative: MRI CERVICAL, THORACIC AND LUMBAR SPINE WITH AND WITHOUT CONTRAST INDICATION: new b cell lymphoma. COMPARISON:  None. TECHNIQUE:  Multiplanar, multisequence imaging of the total spine was performed before and after gadolinium  administration. . IV Contrast:  7 mL of Gadobutrol injection (SINGLE-DOSE) IMAGE QUALITY: Motion-degraded, which reduces diagnostic sensitivity. FINDINGS: ALIGNMENT: Normal alignment of the cervical spine. No acute fracture. Multilevel endplate centered marrow changes. Scattered areas of intrinsic T1/T2 hyperintensity in the vertebral bodies, most consistent with osseous venous malformations/hemangiomas. MARROW SIGNAL: As above. CORD: No cord signal abnormality that can be confirmed in 2 planes. PREVERTEBRAL AND PARASPINAL SOFT TISSUES: No acute abnormality. VISUALIZED POSTERIOR FOSSA: Please refer to concurrent brain MRI. CERVICAL DISC SPACES: Multilevel mild degenerative disc disease. C2-C3: Disc osteophyte complex. No significant neuroforaminal narrowing. Mild spinal canal stenosis. C3-C4: Disc osteophyte complex. Uncovertebral and facet arthropathy contribute to marked bilateral neuroforaminal narrowing, left greater than right. Moderate spinal canal stenosis. C4-C5: Disc osteophyte complex. Uncovertebral and facet arthropathy contribute to mild-moderate bilateral neuroforaminal narrowing. Mild spinal canal stenosis. C5-C6: Disc osteophyte complex. Uncovertebral and facet arthropathy contribute to moderate-marked bilateral neuroforaminal narrowing. Moderate spinal canal stenosis. C6-C7: Disc osteophyte complex. Uncovertebral and facet arthropathy contribute to marked left with moderate right neuroforaminal narrowing. Mild-moderate spinal canal stenosis. C7-T1: No significant neuroforaminal narrowing or spinal canal stenosis. THORACIC DEGENERATIVE CHANGE: Mild spondylotic changes without high-grade neuroforaminal narrowing or spinal canal stenosis. LUMBAR DISC SPACES: Multilevel mild degenerative disc disease. L1-L2: No significant neuroforaminal narrowing or spinal canal stenosis. L2-L3: No significant neuroforaminal narrowing or spinal canal stenosis. L3-L4: Disc bulge. No significant neuroforaminal narrowing.  Mild spinal canal stenosis. L4-L5: Disc bulge, noting abutment of the bilateral traversing L5 nerve roots. Mild bilateral neuroforaminal narrowing. Mild spinal canal stenosis. L5-S1: Disc bulge with superimposed central disc herniation, noting abutment of the right greater than left traversing S1 nerve roots and mild-moderate central spinal canal stenosis. Mild bilateral neuroforaminal narrowing. POSTCONTRAST IMAGING: No abnormal enhancement. OTHER FINDINGS: Retropharyngeal course of the right carotid artery. Multiple large left thyroid nodules measuring greater than 1.5 cm. Scattered T2 hyperintensities in the liver, statistically cyst versus hemangioma. Small amount of debris is noted within the tracheobronchial tree. Patchy atelectasis with small bilateral pleural effusions in the partially visualized lungs. Partially visualized enteric and endotracheal tubes.     Impression: 1.  No evidence of lymphomatous involvement of the spine. 2.  Multilevel spondylotic changes, as detailed above. See narrative above for full details. 3.  Multiple large left thyroid nodules, for which further evaluation with thyroid sonogram is recommended if not previously performed. The study was marked in EPIC for immediate notification. Workstation performed: [id number]     MRI Brain BT w wo Contrast  Result Date: 4/20/2025  Narrative: MRI BRAIN WITH AND WITHOUT CONTRAST INDICATION: Please complete MRI brain w/wo BT protocol. COMPARISON: MRI brain 4/11/2025. TECHNIQUE: Multiplanar, multisequence imaging of the brain and sella was performed before and after gadolinium administration. IV Contrast:  7 mL of Gadobutrol injection IMAGE QUALITY:   Motion-degraded, which reduces diagnostic sensitivity. FINDINGS: BRAIN PARENCHYMA: Redemonstrated mass centered in the left gangliocapsular region, noting slightly increased T2/FLAIR signal abnormality extending inferiorly compared to the prior study 4/11/2025. Decreased mass effect on the lateral  ventricles compared to the prior study, noting there is a prior left frontal catheter tract with edema and hemosiderin deposition along the tract. No kerwin hydrocephalus; however, there are intraventricular blood products, new from the MRI 4/11/2025. Patchy nonspecific areas of T2/FLAIR signal abnormality in the left corona radiata/centrum semiovale, for example series 5, image 28) are also again noted. When compared to the prior MRI, there is decreased enhancement associated with the mass, noted to measure approximately 2.7 x 1.8 cm, previously 3.4 x 2.2 cm. Again seen are enhancing foci in the left posterior centrum semiovale measuring up to 4 mm, which could represent perivascular lesions. Redemonstrated leptomeningeal seeding and multiple areas of ependymal nodules and thickening. There is scattered leptomeningeal/ependymal enhancement about the frontal, temporal and posterior horns as well as the dependent aspect of the fourth ventricle.  There is also signal abnormality again noted along the left cerebral peduncle with faint associated enhancement. Transependymal flow of CSF is slightly decreased in the prior study There is hemosiderin deposition identified along the surface of the brainstem and upper cord. Perfusion: No definite elevated perfusion metrics. Diffusion: The above periventricular and intraventricular lesions demonstrate mild diffusion signal abnormality VENTRICLES: As above. SELLA AND PITUITARY GLAND: Posterior pituitary 6 mm T2 hyperintensity is identified on series 9, image 170. ORBITS: No acute abnormality. PARANASAL SINUSES: Trace mucosal thickening. VASCULATURE:  Evaluation of the major intracranial vasculature demonstrates appropriate flow voids. CALVARIUM AND SKULL BASE: No acute abnormality. EXTRACRANIAL SOFT TISSUES: Fluid secretions are seen in the pharynx. Partially visualized oral route catheters.     Impression: Within the confines of a motion-degraded examination: 1.  Decreased  enhancement and perfusion metrics associated with the left gangliocapsular mass (biopsy-proven lymphoma), noting decreased mass effect on the lateral ventricles and decreased transependymal flow of CSF. The change from MRI dated 4/11/2025 could be related to medical therapy. See narrative above for full discussion. 2.  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 3.  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 This could be further evaluated on nonemergent MRI pituitary protocol. The study was marked in EPIC for immediate notification. Workstation performed: [id number]     CT head wo contrast  Result Date: 4/20/2025  Narrative: CT BRAIN - WITHOUT CONTRAST INDICATION:   s/p EVD removal follow up hydrocephalus. COMPARISON: CT head 4/14/2025. TECHNIQUE:  CT examination of the brain was performed.  Multiplanar 2D reformatted images were created from the source data. Radiation dose length product (DLP) for this visit:  1308 mGy-cm .  This examination, like all CT scans performed in the [facility], was performed utilizing techniques to minimize radiation dose exposure, including the use of iterative reconstruction and automated exposure control. IMAGE QUALITY:  Diagnostic. FINDINGS: PARENCHYMA: Interval removal of left frontal approach ventriculostomy catheter, noting overall stable size and configuration of ventricular system, which again is noted to contain intraventricular blood products. There is hypoattenuation as well as blood  products along the prior catheter tract. Decreased pneumocephalus compared to the prior study. Redemonstrated patchy hypoattenuation corresponding to T2/FLAIR signal abnormality involving the left gangliocapsular region extending into the left frontal lobe inferiorly, better assessed on concurrent brain MRI. VENTRICLES AND EXTRA-AXIAL SPACES: As above. VISUALIZED  ORBITS: No acute abnormality. PARANASAL SINUSES: Trace mucosal thickening. CALVARIUM AND EXTRACRANIAL SOFT TISSUES: Partially visualized oral route catheters with adjacent secretions in the oropharynx.     Impression: 1.  Interval removal of left frontal approach ventriculostomy catheter, noting stable size and configuration of the ventricular system, which contains intraventricular blood products. Blood products also noted along the site of the prior catheter tract. 2.  Refer to concurrent brain MRI for characterization of hypoattenuation corresponding to T2/FLAIR signal abnormality involving the left gangliocapsular region extending into the left frontal lobe inferiorly. The study was marked in EPIC for immediate notification. Workstation performed: [id number]     MRI follow up neuro  Result Date: 4/18/2025  Narrative: MRI FOLLOW UP NEURO INDICATION: new b cell lymphoma. COMPARISON:  None. TECHNIQUE:  Multiplanar, multisequence imaging of the thoracic spine was scheduled to be performed. However, due to patient inability to tolerate the examination, it was prematurely terminated. Localizer series as well as a coronal T2 image were obtained. IMAGE QUALITY: Nondiagnostic. The obtained images are also motion degraded. FINDINGS: Hepatic, pulmonary and thyroid findings are better evaluated on dedicated CTA chest, abdomen and pelvis 3/29/2025.     Impression: Nondiagnostic examination. Recommend repeat examination, as clinically appropriate/tolerable. Workstation performed: [id number]       I have personally reviewed labs, imaging studies, and pertinent reports.      This note has been generated by voice recognition software system.  Therefore, there may be spelling, grammar, and or syntax errors. Please contact if questions arise.

## 2025-05-17 NOTE — PROGRESS NOTES
"OT daily treatment note       05/17/25 1300   Pain Assessment   Pain Assessment Tool 0-10   Pain Score No Pain   Restrictions/Precautions   Precautions 1:1;Bed/chair alarms;Cognitive;Fall Risk;Limb alert;Impulsive;Multiple lines;Supervision on toilet/commode  (Q15 vitals and IV hookup due to sepsis alert for this session)   Lifestyle   Autonomy \"just come on back when you can honey\"   Cognition   Overall Cognitive Status Impaired   Arousal/Participation Responsive;Cooperative   Attention Attends with cues to redirect   Orientation Level Oriented to person;Disoriented to place;Disoriented to situation;Disoriented to time   Memory Decreased recall of biographical information;Decreased recall of recent events;Decreased long term memory;Decreased short term memory;Decreased recall of precautions   Following Commands Follows one step commands with increased time or repetition   Activity Tolerance   Activity Tolerance Treatment limited secondary to medical complications (Comment)  (sepsis alert, q15 vitals, IV line)   Assessment   Treatment Assessment Session grossly focused on formalized family training with pt and spouse, Naldo. OT educated on pts CLOF, DC recommendations and DME needs. Currently, pt is CG/CS for ADLs and Total A for IADL mgmt including med mgmt, meal prep, laundry and finance mgmt. OT then discussed barriers that are contributing to the pts CLOF which includes endurance, activity tolerance, functional mobility, balance, functional standing tolerance, strength, cognitive impairments, decreased safety awareness, decreased insight into deficits, and coordination deficits. Based on pts functional performance, DC recommendations are for 24 hr supervision/assist, TA for IADL mgmt, use of TTB for showers, use of incontinence briefs and otilia pads especially for overnight use, use of remedy soap for incisional care and use of tegaderm for picc line. To note, this session was supposed to be scheduled for a " shower but pt is currently hooked up to an IV pole w/ four bags attached as he is currently on sepsis alert. Majority of the session was spent around discussing DME needs. Pts son purchased a rollator OOP and OT edu that the team is fine w/ a rollator as long as the wife manages the brakes. Wife reported she would rather not have to worry about the brakes so she would prefer a RW instead and reported she will tell her son to purchase a RW. Wife inquired about carpeting in the home which OT edu on walker skis to purchase. Then a discussion was had re: shower chair which OT recommended a TTB to avoid the need for the pt to step into the tub. Wife reports she may know someone that has one but will find out and purchase if needed. OT provided handouts on how to purchase the following: RW, walker skis, TTB, otilia pads, mens incontinence pads and tegaderm (gave her a stash for now). OT edu on BSC in which wife declined stating she wants the pt to use a normal toilet. OT edu on importance of use overnight and she said the bathroom is close enough and she will assist him to the bathroom overnight as needed. OT provided wife w/ remedy soap and edu on correct use of incisional care. OT also edu on how to apply tegaderm correctly. Wife is agreeable to return on Monday at 10am for cont FT as the shower did not happen today. All questions were answered appropriately with no further questions or concerns at this time. Pt is on track for [place] home Thursday w/ HH therapies.   Prognosis Fair   Problem List Decreased strength;Decreased range of motion;Decreased endurance;Impaired balance;Decreased coordination;Decreased mobility;Decreased cognition;Impaired judgement;Decreased safety awareness   Barriers to Discharge Inaccessible home environment;Decreased caregiver support   Plan   Treatment/Interventions ADL retraining;Functional transfer training;Therapeutic exercise;Endurance training;Patient/family training;Equipment  eval/education;Bed mobility;Compensatory technique education   Progress Progressing toward goals   OT Therapy Minutes   OT Time In 1300   OT Time Out 1400   OT Total Time (minutes) 60   OT Mode of treatment - Individual (minutes) 60   OT Mode of treatment - Concurrent (minutes) 0   OT Mode of treatment - Group (minutes) 0   OT Mode of treatment - Co-treat (minutes) 0   OT Mode of Treatment - Total time(minutes) 60 minutes   OT Cumulative Minutes 675   Therapy Time missed   Time missed? No

## 2025-05-17 NOTE — PLAN OF CARE
Problem: PAIN - ADULT  Goal: Verbalizes/displays adequate comfort level or baseline comfort level  Description: Interventions:- Encourage patient to monitor pain and request assistance- Assess pain using appropriate pain scale- Administer analgesics based on type and severity of pain and evaluate response- Implement non-pharmacological measures as appropriate and evaluate response- Consider cultural and social influences on pain and pain management- Notify physician/advanced practitioner if interventions unsuccessful or patient reports new pain  Outcome: Progressing     Problem: INFECTION - ADULT  Goal: Absence or prevention of progression during hospitalization  Description: INTERVENTIONS:- Assess and monitor for signs and symptoms of infection- Monitor lab/diagnostic results- Monitor all insertion sites, i.e. indwelling lines, tubes, and drains- Monitor endotracheal if appropriate and nasal secretions for changes in amount and color- [place] appropriate cooling/warming therapies per order- Administer medications as ordered- Instruct and encourage patient and family to use good hand hygiene technique- Identify and instruct in appropriate isolation precautions for identified infection/condition  Outcome: Progressing     Problem: SAFETY ADULT  Goal: Patient will remain free of falls  Description: INTERVENTIONS:- Educate patient/family on patient safety including physical limitations- Instruct patient to call for assistance with activity - Consult OT/PT to assist with strengthening/mobility - Keep Call bell within reach- Keep bed low and locked with side rails adjusted as appropriate- Keep care items and personal belongings within reach- Initiate and maintain comfort rounds- Make Fall Risk Sign visible to staff- Offer Toileting every 2 Hours, in advance of need- Initiate/Maintain bed alarm- Obtain necessary fall risk management equipment:  - Apply yellow socks and bracelet for high fall risk patients- Consider  moving patient to room near nurses station  Outcome: Progressing  Goal: Maintain or return to baseline ADL function  Description: INTERVENTIONS:-  Assess patient's ability to carry out ADLs; assess patient's baseline for ADL function and identify physical deficits which impact ability to perform ADLs (bathing, care of mouth/teeth, toileting, grooming, dressing, etc.)- Assess/evaluate cause of self-care deficits - Assess range of motion- Assess patient's mobility; develop plan if impaired- Assess patient's need for assistive devices and provide as appropriate- Encourage maximum independence but intervene and supervise when necessary- Involve family in performance of ADLs- Assess for home care needs following discharge - Consider OT consult to assist with ADL evaluation and planning for discharge- Provide patient education as appropriate  Outcome: Progressing  Goal: Maintains/Returns to pre admission functional level  Description: INTERVENTIONS:- Perform AM-PAC 6 Click Basic Mobility/ Daily Activity assessment daily.- Set and communicate daily mobility goal to care team and patient/family/caregiver. - Collaborate with rehabilitation services on mobility goals if consulted- Perform Range of Motion 3 times a day.- Reposition patient every 2 hours.- Dangle patient 3 times a day- Stand patient 3 times a day- Ambulate patient 3 times a day- Out of bed to chair for meals  Outcome: Progressing     Problem: DISCHARGE PLANNING  Goal: Discharge to home or other facility with appropriate resources  Description: INTERVENTIONS:- Identify barriers to discharge w/patient and caregiver- Arrange for needed discharge resources and transportation as appropriate- Identify discharge learning needs (meds, wound care, etc.)- Arrange for interpretive services to assist at discharge as needed- Refer to Case Management Department for coordinating discharge planning if the patient needs post-hospital services based on physician/advanced  practitioner order or complex needs related to functional status, cognitive ability, or social support system  Outcome: Progressing     Problem: Prexisting or High Potential for Compromised Skin Integrity  Goal: Skin integrity is maintained or improved  Description: INTERVENTIONS:- Identify patients at risk for skin breakdown- Assess and monitor skin integrity- Assess and monitor nutrition and hydration status- Monitor labs - Assess for incontinence - Turn and reposition patient- Assist with mobility/ambulation- Relieve pressure over bony prominences- Avoid friction and shearing- Provide appropriate hygiene as needed including keeping skin clean and dry- Evaluate need for skin moisturizer/barrier cream- Collaborate with interdisciplinary team - Patient/family teaching- Consider wound care consult   Outcome: Progressing     Problem: Nutrition/Hydration-ADULT  Goal: Nutrient/Hydration intake appropriate for improving, restoring or maintaining nutritional needs  Description: Monitor and assess patient's nutrition/hydration status for malnutrition. Collaborate with interdisciplinary team and initiate plan and interventions as ordered.  Monitor patient's weight and dietary intake as ordered or per policy. Utilize nutrition screening tool and intervene as necessary. Determine patient's food preferences and provide high-protein, high-caloric foods as appropriate. INTERVENTIONS:- Monitor oral intake, urinary output, labs, and treatment plans- Assess nutrition and hydration status and recommend course of action- Evaluate amount of meals eaten- Assist patient with eating if necessary - Allow adequate time for meals- Recommend/ encourage appropriate diets, oral nutritional supplements, and vitamin/mineral supplements- Order, calculate, and assess calorie counts as needed- Recommend, monitor, and adjust tube feedings and TPN/PPN based on assessed needs- Assess need for intravenous fluids- Provide specific nutrition/hydration  education as appropriate- Include patient/family/caregiver in decisions related to nutrition  Outcome: Progressing

## 2025-05-17 NOTE — ASSESSMENT & PLAN NOTE
5/17 had episodes of hypotension secondary to sepsis/bacteremia. Improved with IVF  Home hyzaar remains on hold

## 2025-05-17 NOTE — ASSESSMENT & PLAN NOTE
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  - Continue to monitor

## 2025-05-17 NOTE — ASSESSMENT & PLAN NOTE
Diagnosed by LP, brain biopsy.  Complicated by obstructive hydrocephalus, status post EVD 4/13 through 4/19.  Status post Rituxan, high-dose methotrexate and intrathecal cytarabine on 4/17.  Received repeat dose of Rituxan on 5/10.  -Ongoing oncology recommendations

## 2025-05-17 NOTE — ASSESSMENT & PLAN NOTE
Suspected possible sepsis with recent uric acid 4.4, repeat 5.1.  Procalcitonin elevated 7.39  Blood cultures and urine culture pending  Further management as per primary service  Received normal saline bolus.  Adjust maintenance Isolyte to 150/h

## 2025-05-17 NOTE — PROGRESS NOTES
"   05/17/25 1400   Pain Assessment   Pain Assessment Tool 0-10   Pain Score No Pain   Restrictions/Precautions   Precautions 1:1;Bed/chair alarms;Cognitive;Fall Risk;Impulsive;Limb alert;Multiple lines;Supervision on toilet/commode  (Q15 vitals and IV hookup due to sepsis alert for this session)   Weight Bearing Restrictions No   ROM Restrictions No   Cognition   Overall Cognitive Status Impaired   Arousal/Participation Responsive;Cooperative   Attention Attends with cues to redirect   Orientation Level Oriented to person;Disoriented to place;Disoriented to situation;Disoriented to time   Memory Decreased recall of biographical information;Decreased recall of recent events;Decreased long term memory;Decreased short term memory;Decreased recall of precautions   Following Commands Follows one step commands with increased time or repetition   Subjective   Subjective \"I guess I'm okay, I don't know whats going on\"   Roll Left and Right   Type of Assistance Needed Supervision   Roll Left and Right CARE Score 4   Sit to Lying   Type of Assistance Needed Supervision   Sit to Lying CARE Score 4   Lying to Sitting on Side of Bed   Type of Assistance Needed Supervision   Lying to Sitting on Side of Bed CARE Score 4   Sit to Stand   Type of Assistance Needed Supervision   Comment to RW and without AD   Sit to Stand CARE Score 4   Bed-Chair Transfer   Type of Assistance Needed Supervision   Comment with and without AD; gait belt donned   Chair/Bed-to-Chair Transfer CARE Score 4   Transfer Bed/Chair/Wheelchair   Limitations Noted In Balance;Problem Solving;Sequencing;LE Strength   Adaptive Equipment Roller Walker;None  (rollator not used during family training per report from [facility] WALKER who said pt wife Naldo said they were going to return the rollator they purchased because he wasnt safe with the brakes and she would prefer to not have to manage them altogether)   Car Transfer   Comment (S)  please compelte during family training " "on Monday with Naldo   Walk 10 Feet   Type of Assistance Needed Incidental touching   Comment RW   Walk 10 Feet CARE Score 4   Walk 50 Feet with Two Turns   Type of Assistance Needed Incidental touching;Adaptive equipment   Comment Rw   Walk 50 Feet with Two Turns CARE Score 4   Walk 150 Feet   Type of Assistance Needed Incidental touching;Adaptive equipment   Comment RW   Walk 150 Feet CARE Score 4   Ambulation   Primary Mode of Locomotion Prior to Admission Walk   Distance Walked (feet) 200 ft   Assist Device Roller Walker  (gait belt donned for family training)   Gait Pattern Inconsistant Vicki;Slow Vicki;Step through;Improper weight shift   Limitations Noted In Balance;Endurance;Sequencing;Speed;Strength;Swing   Provided Assistance with: Balance;Direction  (tended to veer to the R with RW slightly during ambulation this session. when PT redirected pt he asked \"where are we going? you said i was turning\" pt unaware of slight veering)   Walk Assist Level Contact Guard   Findings Naldo practiced walking with Alexis while using gait belt. Pt verbalized and demonstrated understanding and asked clarifying questions. all questions answered and no further questions at end of session   Does the patient walk? 2. Yes   Curb or Single Stair   Style negotiated Curb  (6\")   Type of Assistance Needed Physical assistance;Adaptive equipment   Physical Assistance Level 25% or less   Comment Alcides with RW for balance, max VC needed for sequencing and postitioning. Pt attempted to step down with RW only partially off curb step again this session requiring PT and Naldo verbally having pt stop and place RW on ground. pt did not understand why what he was doing was wrong even after explanation that this poses a fall risk   1 Step (Curb) CARE Score 3   4 Steps   Type of Assistance Needed Physical assistance   Physical Assistance Level 25% or less   Comment 1 UE support on LHR with gait belt donned. Pt needed inc assistance today for " "balance and also demonstated some instability when descending stairs. Pt was also hesitant of foot placement and sequencing and was slightly agitated with PCA who was managing pt IV pole and 4 lines telling her they needed to be in different spots, but could not tell her where to put them. He was repeating \"over there\" without providing instruction where to put them.   4 Steps CARE Score 3   Stairs   Type Stairs  (6\"  steps. Naldo also practiced with pt asc and then on additional trial was in front of him as he descended on the 4\" side. Naldo provided Alcides and asked clarifying questions about potential LOB. PT answered questions with Naldo understanding.)   # of Steps 6   Weight Bearing Precautions Fall Risk   Assist Devices Single Rail   Assessment   Treatment Assessment Pt session today focused on family training with pt and pt wife Naldo. Upon entering for session PT was made aware that pt was on sepsis alert which PT cleared with ADELINE Russell that pt was cleared to participate in therapy. Alexis's 1:1 accompanied us to gym and assisted with managing dynamap and IV pole and she also completed Q15 vitals. During session PT reviewed current recommendations for pt to be S/CGA with RW and gait belt donned, if pt is going to ambulate short distances without AD then to be hands on hips CGA. Naldo verbalized and demosntrated understanding of this throughout session and asked clarifying questions if pt were to have a LOB. PT demonstrated with PCA on how to navigate an anterior/posterior LOB and then Naldo practiced with PT having LOB. During session PT provided Naldo with a handout on search term and pricing of a gait belt with handles for them to purchase from [employer]. PT edu on current barriers to pts functional mobility including LE strength, balance, righting reactions, endurance and dec insight into deficits and this is why we are recommending 24 supervision at HI. Naldo reported that she will be back for FT on Monday and PT " edu that we will schedule short FT for PT to demonstrate the car transfer due to time limitations today. Naldo reported feeling much better after this family training session and she had no further questions regarding recommendations, pt is on track for DC Thursday to home with  therapy services.   Family/Caregiver Present Yes- wife Naldo   PT Family training done with: Naldo   Problem List Decreased strength;Decreased range of motion;Decreased endurance;Impaired balance;Decreased coordination;Decreased mobility;Decreased cognition;Impaired judgement;Decreased safety awareness   Barriers to Discharge Inaccessible home environment;Decreased caregiver support   PT Barriers   Physical Impairment Decreased strength;Decreased range of motion;Decreased endurance;Impaired balance;Decreased mobility;Decreased safety awareness;Impaired judgement;Decreased cognition   Functional Limitation Car transfers;Stair negotiation;Standing;Transfers;Walking   Plan   Treatment/Interventions ADL retraining;Functional transfer training;LE strengthening/ROM;Elevations;Therapeutic exercise;Endurance training;Cognitive reorientation;Patient/family training;Equipment eval/education;Bed mobility;Gait training;Compensatory technique education   Progress Progressing toward goals   PT Therapy Minutes   PT Time In 1400   PT Time Out 1500   PT Total Time (minutes) 60   PT Mode of treatment - Individual (minutes) 60   PT Mode of treatment - Concurrent (minutes) 0   PT Mode of treatment - Group (minutes) 0   PT Mode of treatment - Co-treat (minutes) 0   PT Mode of Treatment - Total time(minutes) 60 minutes   PT Cumulative Minutes 626   Therapy Time missed   Time missed? No

## 2025-05-17 NOTE — CONSULTS
Consultation - Infectious Disease   Name: Alexis Dennison 64 y.o. male I MRN: [id number]  Unit/Bed#: -01 I Date of Admission: 5/7/2025   Date of Service: 5/17/2025 I Hospital Day: 10   Inpatient consult to Infectious Diseases  Consult performed by: Les Cervantes MD  Consult ordered by: Jesenia Buenrostro PA-C        Physician Requesting Evaluation: Skyler Hendrickson MD   Reason for Evaluation / Principal Problem: Leukopenia, SIRS response    Assessment & Plan  SIRS (systemic inflammatory response syndrome) (HCC)  Patient developing leukopenia again on 5/15, along with new tachycardia and transient hypotension which was fluid responsive.  Lactate is elevated, procalcitonin elevated.  Both lactate and procalcitonin may be elevated in setting of lymphoma which is being treated. Received rituximab on 5/10 which could contribute to leukopenia.  He does endorse urinary hesitancy and per nursing had very foul-smelling urine; consider possibility of recurrent UTI.  He otherwise is hemodynamically stable and on room air without an obvious source of infection at this time. Does have a PICC line in place, placed on 5/1, but does not examine as infected.  -As patient is immunosuppressed, start IV cefepime 2 g every 12 hours; may be at risk for more resistant GNR's given prolonged hospitalization  -Follow-up blood cultures and UA  -Please check PVR to ensure patient is not retaining urine  -If cultures negative and patient remained stable, hope to de-escalate antibiotics soon  -Continue to monitor vital signs  -Repeat CBC + diff tomorrow to trend WBC and ANC  Leukopenia  Patient previously leukopenic in setting of lymphoma and chemotherapy.  He has had only 1 normal WBC count in the last 3 weeks.  Developing increasing leukopenia since 5/15, though did receive rituximab on 5/10 which may be the cause.  ANC was 1.9 on 5/15.  -Continue to monitor CBCD  -Follow up infectious workup as above  Recent E coli bacteremia  Positive blood  culture on 4/29.  E. coli was resistant only to ampicillin and Bactrim.  Patient completed 7 days of IV antibiotic on 5/5.  Source was felt to be UTI.  CT abdomen/pelvis was unremarkable.  Previous PICC line was removed.  New PICC line placed on 5/1.  -Follow-up repeat blood cultures  Primary central nervous system (CNS) lymphoma  Diagnosed by LP, brain biopsy.  Complicated by obstructive hydrocephalus, status post EVD 4/13 through 4/19.  Status post Rituxan, high-dose methotrexate and intrathecal cytarabine on 4/17.  Received repeat dose of Rituxan on 5/10.  -Ongoing oncology recommendations  Type 2 diabetes mellitus with hyperglycemia, without long-term current use of insulin (HCC)  Lab Results   Component Value Date    HGBA1C 6.6 (H) 02/22/2025   Relatively well-controlled, though remains a risk factor for infection.  -Tight glycemic control, particularly while patient remains on steroid  LETY (acute kidney injury) (HCC)  Baseline creatinine 0.8-1.1.  Peaked on 4/22 at 2.7.  Nephrology suspected prerenal or ATN.  Creatinine has been fluctuating but overall relatively stable.  -Continue to monitor  -Ongoing nephrology recommendations  Thrombocytopenia (HCC)  In setting of CNS lymphoma, patient also receiving recent rituximab.  -Continue to monitor CBC  Encephalopathy  Multifactorial due to underlying CNS lymphoma, medications, prolonged hospitalization in elderly patient, steroid use.  - Continue to monitor  Liver lesion  CT 4/30 noted a 1 cm cyst on the right lobe.  - Outpatient follow-up    I have discussed the above management plan in detail with the primary service.     Antibiotics:  None    History of Present Illness   Alexis Dennison is a 64 y.o. year old male who first presented in late March with months of confusion.  He was ultimately diagnosed with CNS lymphoma aced on LP with CSF cytology.  He then developed obstructive hydrocephalus and required an EVD on 4/13.  He underwent brain biopsy 4/14 and was  started on steroid.  Pathology ultimately came back with diffuse large B-cell lymphoma.  He was started on Rituxan via PICC line on 4/16 as well as high-dose methotrexate and intrathecal cytarabine on 4/17.  EVD eventually removed 4/19.  He had new fever overnight 4/28 and 4 hours PICC line while agitated.  Blood cultures then grew E. coli in 1 set.  Source was felt to be UTI and he completed 7 days of IV antibiotic with assistance of ID.  Patient has been off of antibiotic as of 5/6.  Has had only intermittent labs checked.  Patient was leukopenic on 5/9.  He received rituximab in 5/10.  WBC was 5.1 on 5/12, now 1.2 today.  Patient's chart flagged for sepsis today given leukopenia and tachycardia was transient hypotension.  This led to routine checking a lactic acid level and procalcitonin today, both of which are elevated.  Her also was reportedly some foul-smelling urine this morning.  ID asked to reevaluate patient for possible developing infection.     Patient has not been febrile. Both his tachycardia and hypotension have improved with IV fluids. He is not tachypneic, he is on room air.  He overall appears comfortable.  He reports having some urinary hesitancy, feeling like his bladder is not fully empty, but denies any suprapubic pain or dysuria.  Denies any abdominal pain, flank pain, nausea or diarrhea.  Denies cough or shortness of breath.  Denies any pain with his PICC line.  No other major symptoms.    A complete review of systems is negative other than that noted in the HPI.    Medical History Review: I have reviewed the patient's PMH, PSH, Social History, Family History, Meds, and Allergies     Objective :  Temp:  [97.6 °F (36.4 °C)-99 °F (37.2 °C)] (P) 98 °F (36.7 °C)  HR:  [] (P) 107  BP: ()/(58-75) (P) 131/71  Resp:  [16-18] (P) 16  SpO2:  [95 %-100 %] (P) 98 %  O2 Device: (P) None (Room air)    General:  No acute distress, appearing nontoxic and very comfortable, sitting in  chair  Psychiatric:  Awake and alert, answers questions appropriately  Pulmonary:  Normal respiratory excursion without accessory muscle use  Abdomen:  Soft, nontender.  No suprapubic tenderness or flank tenderness.  Extremities:  No edema.  Left arm with PICC line, exit site without any erythema, swelling or drainage.  Skin:  No rashes.  Well-healed incision site on scalp at site of prior EVD, no erythema or drainage.        Lab Results: I have reviewed the following results:  Results from last 7 days   Lab Units 05/17/25  0941 05/15/25  0600 05/12/25  0614   WBC Thousand/uL 1.26* 3.35* 5.16   HEMOGLOBIN g/dL 8.5* 9.0* 9.3*   PLATELETS Thousands/uL 85* 115* 117*     Results from last 7 days   Lab Units 05/17/25  0537 05/16/25  0618 05/15/25  0600 05/12/25  0614   SODIUM mmol/L 136 135 136 136   POTASSIUM mmol/L 3.8 4.2 3.9 4.3   CHLORIDE mmol/L 92* 98 98 101   CO2 mmol/L 31 29 29 30   BUN mg/dL 25 27* 27* 33*   CREATININE mg/dL 1.38* 1.19 1.25 1.11   EGFR ml/min/1.73sq m 53 63 60 69   CALCIUM mg/dL 8.3* 8.8 8.5 9.0   AST U/L 17  --   --  14   ALT U/L 44  --   --  103*   ALK PHOS U/L 66  --   --  75   ALBUMIN g/dL 3.4*  --   --  3.4*         Results from last 7 days   Lab Units 05/17/25  1217   PROCALCITONIN ng/ml 7.39*

## 2025-05-17 NOTE — ASSESSMENT & PLAN NOTE
Patient developing leukopenia again on 5/15, along with new tachycardia and transient hypotension which was fluid responsive.  Lactate is elevated, procalcitonin elevated.  Both lactate and procalcitonin may be elevated in setting of lymphoma which is being treated. Received rituximab on 5/10 which could contribute to leukopenia.  He does endorse urinary hesitancy and per nursing had very foul-smelling urine; consider possibility of recurrent UTI.  He otherwise is hemodynamically stable and on room air without an obvious source of infection at this time. Does have a PICC line in place, placed on 5/1, but does not examine as infected.  -As patient is immunosuppressed, start IV cefepime 2 g every 12 hours; may be at risk for more resistant GNR's given prolonged hospitalization  -Follow-up blood cultures and UA  -Please check PVR to ensure patient is not retaining urine  -If cultures negative and patient remained stable, hope to de-escalate antibiotics soon  -Continue to monitor vital signs  -Repeat CBC + diff tomorrow to trend WBC and ANC

## 2025-05-17 NOTE — ASSESSMENT & PLAN NOTE
Positive blood culture on 4/29.  E. coli was resistant only to ampicillin and Bactrim.  Patient completed 7 days of IV antibiotic on 5/5.  Source was felt to be UTI.  CT abdomen/pelvis was unremarkable.  Previous PICC line was removed.  New PICC line placed on 5/1.  -Follow-up repeat blood cultures

## 2025-05-17 NOTE — QUICK NOTE
Lactic acid 4.4  Procal 7.39  Will start cefepime for now pending urine culture and blood cultures  ID consulted as they were recently following for e coli bacteremia

## 2025-05-17 NOTE — PROGRESS NOTES
NEPHROLOGY HOSPITAL PROGRESS NOTE   Alexis Dennison 64 y.o. male MRN: [id number]  Unit/Bed#: -01 Encounter: [id number]  Reason for Consult: Acute kidney injury    Assessment & Plan  LETY (acute kidney injury) ([facility])  Baseline creatinine 0.8 to 1.1.   Etiology suspected to be ATN +/- prerenal azotemia.   Peak SCr 2.77 on 4/22/25.   Current creatinine fairly stable at 1.3 may be an element of prerenal azotemia.    Primary central nervous system (CNS) lymphoma  Hematology is following.   Needs high dose methotrexate -currently on hold given suspected sepsis.  Blood cultures and urine culture are pending.  Will need urinary alkalinization with methotrexate administration.   Previously on sodium bicarb infusion for now we will switch to Isolyte  Goal urine pH is 7.0 or above.   HTN (hypertension)  BP is at goal.   Not on BP meds.     SIRS (systemic inflammatory response syndrome) ([facility])  Suspected possible sepsis with recent uric acid 4.4, repeat 5.1.  Procalcitonin elevated 7.39  Blood cultures and urine culture pending  Further management as per primary service  Received normal saline bolus.  Adjust maintenance Isolyte to 150/h    Summary:  Suspected SIRS/sepsis.  Noted lactic acidosis status post normal saline bolus.  Continue with Isolyte at 150/h  Continue to monitor renal function electrolytes closely  Methotrexate currently on hold.    SUBJECTIVE / 24H INTERVAL HISTORY:  Seen and examined.  Patient awake and alert.  Does not appear short of breath.  No reported chest pain or abdominal pain.    OBJECTIVE:  Current Weight: Weight - Scale: 74.7 kg (164 lb 9.6 oz)  Vitals:    05/17/25 1506 05/17/25 1530 05/17/25 1545 05/17/25 1600   BP: 118/70 98/61 105/58 104/60   BP Location: Right arm Right arm Right arm Right arm   Pulse: 103 104 99 98   Resp: 16 16 16 16   Temp: 98 °F (36.7 °C) 98.2 °F (36.8 °C) 99 °F (37.2 °C) 98.6 °F (37 °C)   TempSrc: Oral Oral Oral Oral   SpO2: 94% 96% 97% 94%   Weight:       Height:      "      Intake/Output Summary (Last 24 hours) at 5/17/2025 1617  Last data filed at 5/17/2025 1422  Gross per 24 hour   Intake 480 ml   Output 100 ml   Net 380 ml       Physical Exam  Constitutional:       Appearance: He is not ill-appearing.     Cardiovascular:      Rate and Rhythm: Regular rhythm. Tachycardia present.   Pulmonary:      Effort: Pulmonary effort is normal.      Breath sounds: Normal breath sounds.   Abdominal:      General: There is distension.      Palpations: Abdomen is soft.      Tenderness: There is no guarding.     Musculoskeletal:      Right lower leg: No edema.      Left lower leg: No edema.     Skin:     General: Skin is warm and dry.      Findings: No rash.     Neurological:      Mental Status: He is alert. Mental status is at baseline.         Medications:  Current Medications[1]    Laboratory Results:  Results from last 7 days   Lab Units 05/17/25  0941 05/17/25  0537 05/16/25  0618 05/15/25  0600 05/12/25  0614   WBC Thousand/uL 1.26*  --   --  3.35* 5.16   HEMOGLOBIN g/dL 8.5*  --   --  9.0* 9.3*   HEMATOCRIT % 24.1*  --   --  26.1* 26.4*   PLATELETS Thousands/uL 85*  --   --  115* 117*   POTASSIUM mmol/L  --  3.8 4.2 3.9 4.3   CHLORIDE mmol/L  --  92* 98 98 101   CO2 mmol/L  --  31 29 29 30   BUN mg/dL  --  25 27* 27* 33*   CREATININE mg/dL  --  1.38* 1.19 1.25 1.11   CALCIUM mg/dL  --  8.3* 8.8 8.5 9.0   MAGNESIUM mg/dL  --   --   --   --  1.4*   PHOSPHORUS mg/dL  --   --   --   --  2.7       Portions of the record may have been created with voice recognition software. Occasional wrong word or \"sound a like\" substitutions may have occurred due to the inherent limitations of voice recognition software. Read the chart carefully and recognize, using context, where substitutions have occurred.If you have any questions, please contact the dictating provider.       [1]   Current Facility-Administered Medications:     acetaminophen (TYLENOL) tablet 650 mg, 650 mg, Oral, Q6H EDNANCrispin" MD Yasmeen, 650 mg at 05/16/25 0844    allopurinol (ZYLOPRIM) tablet 300 mg, 300 mg, Oral, Daily, Crispin Arana MD, 300 mg at 05/17/25 0830    alteplase (CATHFLO) injection 2 mg, 2 mg, Intracatheter, Q1MIN PRN, Crispin Arana MD    alteplase (CATHFLO) injection 2 mg, 2 mg, Intracatheter, Q1MIN PRN, Magali Lee MD    aluminum-magnesium hydroxide-simethicone (MAALOX) oral suspension 30 mL, 30 mL, Oral, Q4H PRN, Crispin Arana MD    atorvastatin (LIPITOR) tablet 20 mg, 20 mg, Oral, Daily, Crispin Arana MD, 20 mg at 05/17/25 0831    bisacodyl (DULCOLAX) rectal suppository 10 mg, 10 mg, Rectal, Daily PRN, Skyler Hendrickson MD    calcium carbonate (TUMS) chewable tablet 1,000 mg, 1,000 mg, Oral, Daily PRN, Crispin Arana MD    [START ON 5/18/2025] ceFEPime (MAXIPIME) 2,000 mg in dextrose 5 % 50 mL IVPB, 2,000 mg, Intravenous, Q12H, Jesenia Buenrostro PA-C    chlorhexidine (PERIDEX) 0.12 % oral rinse 15 mL, 15 mL, Mouth/Throat, Q12H CAIT, Crispin Arana MD, 15 mL at 05/17/25 0830    [COMPLETED] dexamethasone (DECADRON) tablet 4 mg, 4 mg, Oral, Q12H CAIT, 4 mg at 05/11/25 0908 **FOLLOWED BY** [COMPLETED] dexamethasone (DECADRON) tablet 2 mg, 2 mg, Oral, Q12H CAIT, 2 mg at 05/15/25 0803 **FOLLOWED BY** dexamethasone (DECADRON) tablet 1 mg, 1 mg, Oral, Q12H CAIT, Crispin Arana MD, 1 mg at 05/17/25 0830    docusate sodium (COLACE) capsule 100 mg, 100 mg, Oral, BID, Jessica Arreola DO, 100 mg at 05/17/25 0831    Filgrastim-aafi (NIVESTYM) subcutaneous injection 480 mcg, 480 mcg, Subcutaneous, Daily, Nicki Renteria MD, 480 mcg at 05/17/25 1342    heparin (porcine) subcutaneous injection 5,000 Units, 5,000 Units, Subcutaneous, Q8H CAIT, Crispin Arana MD, 5,000 Units at 05/17/25 1342    insulin lispro (HumALOG/ADMELOG) 100 units/mL subcutaneous injection 1-5 Units, 1-5 Units, Subcutaneous, TID AC, 1 Units at 05/17/25 1229 **AND** Fingerstick Glucose (POCT), , , TID AC, PATI Porras    insulin lispro (HumALOG/ADMELOG)  100 units/mL subcutaneous injection 1-5 Units, 1-5 Units, Subcutaneous, HS, PATI Porras    insulin lispro (HumALOG/ADMELOG) 100 units/mL subcutaneous injection 6 Units, 6 Units, Subcutaneous, TID With Meals, PATI Porras, 6 Units at 05/17/25 1229    lactulose (CHRONULAC) oral solution 20 g, 20 g, Oral, Daily PRN, Joshua Kaminski DO, 20 g at 05/14/25 1805    melatonin tablet 6 mg, 6 mg, Oral, HS, Crispin Arana MD, 6 mg at 05/16/25 2118    [Held by provider] metFORMIN (GLUCOPHAGE-XR) 24 hr tablet 1,000 mg, 1,000 mg, Oral, Daily, Crispin Arana MD, 1,000 mg at 05/17/25 0829    multi-electrolyte (Plasmalyte-A/Isolyte-S PH 7.4/Normosol-R) IV solution, 100 mL/hr, Intravenous, Continuous, rGiffin Sheikh DO, Last Rate: 100 mL/hr at 05/17/25 1333, 100 mL/hr at 05/17/25 1333    OLANZapine (ZyPREXA) IM injection 5 mg, 5 mg, Intramuscular, BID PRN, Crispin Arana MD    ondansetron (ZOFRAN) injection 4 mg, 4 mg, Intravenous, Q6H PRN, Crispin Arana MD    oxyCODONE (ROXICODONE) split tablet 2.5 mg, 2.5 mg, Oral, Q4H PRN, 2.5 mg at 05/16/25 0844 **OR** oxyCODONE (ROXICODONE) IR tablet 5 mg, 5 mg, Oral, Q4H PRN, Crispin Arana MD    pantoprazole (PROTONIX) EC tablet 40 mg, 40 mg, Oral, Early Morning, Crispin Arana MD, 40 mg at 05/17/25 0539    polyethylene glycol (MIRALAX) packet 17 g, 17 g, Oral, Daily PRN, Skyler Hendrickson MD    QUEtiapine (SEROquel) tablet 12.5 mg, 12.5 mg, Oral, Daily, Crispin Arana MD, 12.5 mg at 05/17/25 1225    QUEtiapine (SEROquel) tablet 50 mg, 50 mg, Oral, HS, Crispin Arana MD, 50 mg at 05/16/25 2115    senna (SENOKOT) tablet 17.2 mg, 2 tablet, Oral, Daily With Lunch, Jessica Arreola DO, 17.2 mg at 05/17/25 1225    sitaGLIPtin (JANUVIA) tablet 50 mg, 50 mg, Oral, Daily, PATI Porras, 50 mg at 05/17/25 7317

## 2025-05-17 NOTE — ASSESSMENT & PLAN NOTE
Patient does have thrombocytopenia at baseline but usually his platelet counts have been above 100 K,  This morning noted with worsening thrombocytopenia, suspected underlying infectious etiology.  Continue to monitor CBC daily, will need platelet transfusion if counts drop below 20K or in setting of any spontaneous bleeding.

## 2025-05-18 ENCOUNTER — APPOINTMENT (INPATIENT)
Dept: RADIOLOGY | Facility: HOSPITAL | Age: 65
DRG: 949 | End: 2025-05-18
Payer: COMMERCIAL

## 2025-05-18 LAB
ABO GROUP BLD: NORMAL
ALBUMIN SERPL BCG-MCNC: 2.6 G/DL (ref 3.5–5)
ALP SERPL-CCNC: 67 U/L (ref 34–104)
ALT SERPL W P-5'-P-CCNC: 28 U/L (ref 7–52)
ANION GAP SERPL CALCULATED.3IONS-SCNC: 5 MMOL/L (ref 4–13)
AST SERPL W P-5'-P-CCNC: 13 U/L (ref 13–39)
BILIRUB SERPL-MCNC: 0.46 MG/DL (ref 0.2–1)
BLD GP AB SCN SERPL QL: NEGATIVE
BUN SERPL-MCNC: 20 MG/DL (ref 5–25)
CALCIUM ALBUM COR SERPL-MCNC: 8.5 MG/DL (ref 8.3–10.1)
CALCIUM SERPL-MCNC: 7.4 MG/DL (ref 8.4–10.2)
CHLORIDE SERPL-SCNC: 105 MMOL/L (ref 96–108)
CO2 SERPL-SCNC: 30 MMOL/L (ref 21–32)
CREAT SERPL-MCNC: 1.28 MG/DL (ref 0.6–1.3)
ERYTHROCYTE [DISTWIDTH] IN BLOOD BY AUTOMATED COUNT: 15.5 % (ref 11.6–15.1)
ERYTHROCYTE [DISTWIDTH] IN BLOOD BY AUTOMATED COUNT: 15.5 % (ref 11.6–15.1)
GFR SERPL CREATININE-BSD FRML MDRD: 58 ML/MIN/1.73SQ M
GLUCOSE P FAST SERPL-MCNC: 129 MG/DL (ref 65–99)
GLUCOSE SERPL-MCNC: 124 MG/DL (ref 65–140)
GLUCOSE SERPL-MCNC: 129 MG/DL (ref 65–140)
GLUCOSE SERPL-MCNC: 149 MG/DL (ref 65–140)
GLUCOSE SERPL-MCNC: 159 MG/DL (ref 65–140)
GLUCOSE SERPL-MCNC: 195 MG/DL (ref 65–140)
HCT VFR BLD AUTO: 18.9 % (ref 36.5–49.3)
HCT VFR BLD AUTO: 20.1 % (ref 36.5–49.3)
HGB BLD-MCNC: 6.3 G/DL (ref 12–17)
HGB BLD-MCNC: 6.7 G/DL (ref 12–17)
MCH RBC QN AUTO: 31.2 PG (ref 26.8–34.3)
MCH RBC QN AUTO: 31.3 PG (ref 26.8–34.3)
MCHC RBC AUTO-ENTMCNC: 33.3 G/DL (ref 31.4–37.4)
MCHC RBC AUTO-ENTMCNC: 33.3 G/DL (ref 31.4–37.4)
MCV RBC AUTO: 94 FL (ref 82–98)
MCV RBC AUTO: 94 FL (ref 82–98)
NRBC BLD AUTO-RTO: 2 /100 WBCS
PLATELET # BLD AUTO: 66 THOUSANDS/UL (ref 149–390)
PLATELET # BLD AUTO: 68 THOUSANDS/UL (ref 149–390)
PMV BLD AUTO: 10.2 FL (ref 8.9–12.7)
PMV BLD AUTO: 10.6 FL (ref 8.9–12.7)
POTASSIUM SERPL-SCNC: 3.5 MMOL/L (ref 3.5–5.3)
PROCALCITONIN SERPL-MCNC: 6.55 NG/ML
PROT SERPL-MCNC: 4.7 G/DL (ref 6.4–8.4)
RBC # BLD AUTO: 2.01 MILLION/UL (ref 3.88–5.62)
RBC # BLD AUTO: 2.15 MILLION/UL (ref 3.88–5.62)
RH BLD: POSITIVE
SODIUM SERPL-SCNC: 140 MMOL/L (ref 135–147)
SPECIMEN EXPIRATION DATE: NORMAL
WBC # BLD AUTO: 1.84 THOUSAND/UL (ref 4.31–10.16)
WBC # BLD AUTO: 1.91 THOUSAND/UL (ref 4.31–10.16)

## 2025-05-18 PROCEDURE — 99232 SBSQ HOSP IP/OBS MODERATE 35: CPT | Performed by: INTERNAL MEDICINE

## 2025-05-18 PROCEDURE — G0545 PR INHERENT VISIT TO INPT: HCPCS | Performed by: INTERNAL MEDICINE

## 2025-05-18 PROCEDURE — 86850 RBC ANTIBODY SCREEN: CPT | Performed by: PHYSICIAN ASSISTANT

## 2025-05-18 PROCEDURE — 86900 BLOOD TYPING SEROLOGIC ABO: CPT | Performed by: PHYSICIAN ASSISTANT

## 2025-05-18 PROCEDURE — 86923 COMPATIBILITY TEST ELECTRIC: CPT

## 2025-05-18 PROCEDURE — 82948 REAGENT STRIP/BLOOD GLUCOSE: CPT

## 2025-05-18 PROCEDURE — P9040 RBC LEUKOREDUCED IRRADIATED: HCPCS

## 2025-05-18 PROCEDURE — 74177 CT ABD & PELVIS W/CONTRAST: CPT

## 2025-05-18 PROCEDURE — 84145 PROCALCITONIN (PCT): CPT | Performed by: PHYSICIAN ASSISTANT

## 2025-05-18 PROCEDURE — 99233 SBSQ HOSP IP/OBS HIGH 50: CPT | Performed by: PHYSICIAN ASSISTANT

## 2025-05-18 PROCEDURE — 30233N1 TRANSFUSION OF NONAUTOLOGOUS RED BLOOD CELLS INTO PERIPHERAL VEIN, PERCUTANEOUS APPROACH: ICD-10-PCS | Performed by: PHYSICIAN ASSISTANT

## 2025-05-18 PROCEDURE — 99233 SBSQ HOSP IP/OBS HIGH 50: CPT | Performed by: INTERNAL MEDICINE

## 2025-05-18 PROCEDURE — 86901 BLOOD TYPING SEROLOGIC RH(D): CPT | Performed by: PHYSICIAN ASSISTANT

## 2025-05-18 PROCEDURE — 85027 COMPLETE CBC AUTOMATED: CPT | Performed by: PHYSICIAN ASSISTANT

## 2025-05-18 PROCEDURE — 80053 COMPREHEN METABOLIC PANEL: CPT | Performed by: PHYSICIAN ASSISTANT

## 2025-05-18 RX ADMIN — INSULIN LISPRO 1 UNITS: 100 INJECTION, SOLUTION INTRAVENOUS; SUBCUTANEOUS at 16:52

## 2025-05-18 RX ADMIN — IOHEXOL 85 ML: 350 INJECTION, SOLUTION INTRAVENOUS at 11:31

## 2025-05-18 RX ADMIN — ALLOPURINOL 300 MG: 300 TABLET ORAL at 08:32

## 2025-05-18 RX ADMIN — INSULIN LISPRO 6 UNITS: 100 INJECTION, SOLUTION INTRAVENOUS; SUBCUTANEOUS at 08:30

## 2025-05-18 RX ADMIN — QUETIAPINE 12.5 MG: 25 TABLET ORAL at 11:53

## 2025-05-18 RX ADMIN — DEXAMETHASONE 1 MG: 2 TABLET ORAL at 22:04

## 2025-05-18 RX ADMIN — HEPARIN SODIUM 5000 UNITS: 5000 INJECTION INTRAVENOUS; SUBCUTANEOUS at 13:21

## 2025-05-18 RX ADMIN — DEXAMETHASONE 1 MG: 2 TABLET ORAL at 08:32

## 2025-05-18 RX ADMIN — CEFEPIME 2000 MG: 2 INJECTION, POWDER, FOR SOLUTION INTRAVENOUS at 01:41

## 2025-05-18 RX ADMIN — FILGRASTIM-AAFI 480 MCG: 480 INJECTION, SOLUTION SUBCUTANEOUS at 08:39

## 2025-05-18 RX ADMIN — SODIUM CHLORIDE, SODIUM GLUCONATE, SODIUM ACETATE, POTASSIUM CHLORIDE, MAGNESIUM CHLORIDE, SODIUM PHOSPHATE, DIBASIC, AND POTASSIUM PHOSPHATE 150 ML/HR: .53; .5; .37; .037; .03; .012; .00082 INJECTION, SOLUTION INTRAVENOUS at 13:22

## 2025-05-18 RX ADMIN — INSULIN LISPRO 6 UNITS: 100 INJECTION, SOLUTION INTRAVENOUS; SUBCUTANEOUS at 11:53

## 2025-05-18 RX ADMIN — SITAGLIPTIN 50 MG: 50 TABLET, FILM COATED ORAL at 08:32

## 2025-05-18 RX ADMIN — HEPARIN SODIUM 5000 UNITS: 5000 INJECTION INTRAVENOUS; SUBCUTANEOUS at 05:51

## 2025-05-18 RX ADMIN — SENNOSIDES 17.2 MG: 8.6 TABLET, FILM COATED ORAL at 11:53

## 2025-05-18 RX ADMIN — CEFEPIME 2000 MG: 2 INJECTION, POWDER, FOR SOLUTION INTRAVENOUS at 13:22

## 2025-05-18 RX ADMIN — INSULIN LISPRO 1 UNITS: 100 INJECTION, SOLUTION INTRAVENOUS; SUBCUTANEOUS at 22:13

## 2025-05-18 RX ADMIN — CHLORHEXIDINE GLUCONATE 15 ML: 1.2 SOLUTION ORAL at 22:05

## 2025-05-18 RX ADMIN — PANTOPRAZOLE SODIUM 40 MG: 40 TABLET, DELAYED RELEASE ORAL at 05:51

## 2025-05-18 RX ADMIN — INSULIN LISPRO 6 UNITS: 100 INJECTION, SOLUTION INTRAVENOUS; SUBCUTANEOUS at 16:53

## 2025-05-18 RX ADMIN — Medication 6 MG: at 22:10

## 2025-05-18 RX ADMIN — DOCUSATE SODIUM 100 MG: 100 CAPSULE, LIQUID FILLED ORAL at 08:32

## 2025-05-18 RX ADMIN — ALTEPLASE 2 MG: 2.2 INJECTION, POWDER, LYOPHILIZED, FOR SOLUTION INTRAVENOUS at 20:29

## 2025-05-18 RX ADMIN — HEPARIN SODIUM 5000 UNITS: 5000 INJECTION INTRAVENOUS; SUBCUTANEOUS at 22:06

## 2025-05-18 RX ADMIN — ATORVASTATIN CALCIUM 20 MG: 20 TABLET, FILM COATED ORAL at 08:32

## 2025-05-18 RX ADMIN — DOCUSATE SODIUM 100 MG: 100 CAPSULE, LIQUID FILLED ORAL at 16:53

## 2025-05-18 RX ADMIN — QUETIAPINE 50 MG: 25 TABLET ORAL at 22:10

## 2025-05-18 RX ADMIN — OXYCODONE HYDROCHLORIDE 5 MG: 5 TABLET ORAL at 02:21

## 2025-05-18 RX ADMIN — SODIUM CHLORIDE, SODIUM GLUCONATE, SODIUM ACETATE, POTASSIUM CHLORIDE, MAGNESIUM CHLORIDE, SODIUM PHOSPHATE, DIBASIC, AND POTASSIUM PHOSPHATE 150 ML/HR: .53; .5; .37; .037; .03; .012; .00082 INJECTION, SOLUTION INTRAVENOUS at 05:46

## 2025-05-18 NOTE — ASSESSMENT & PLAN NOTE
"AST is 111, previously 114,  ALT is 322 previously 149  D/w H-O, they are aware =  \"Could be due to recent chemotherapy versus antibiotics versus liver lesions\".    CMP 5/9/25 = AST 57 (previously 111),  (previously 322)  As of today- now resolved  "

## 2025-05-18 NOTE — PROGRESS NOTES
"Progress Note - Hospitalist   Name: Alexis Dennison 64 y.o. male I MRN: [id number]  Unit/Bed#: -01 I Date of Admission: 5/7/2025   Date of Service: 5/18/2025 I Hospital Day: 11    Assessment & Plan  Primary central nervous system (CNS) lymphoma  Patient with development of worsening confusion, and was seen in the ED on 3/24/2025.  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  Lymphoma/leukemia panel was nondiagnostic  CTH 4/3/25: concerning for worsening hydrocephalus.   Repeat LP 4/7/25 = undiagnostic again   MRI brain 4/11/25: \"Interval enlargement of the dominant left anterior basal ganglionic mass lesion with greater surrounding vasogenic edema and mass effect. 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  S/p EVD, self removed 4/26  S/p Keppra 500mg BID x 7 days for postoperative seizure ppx   Started on chemotherapy during hospital stay and is for weekly Rituximab and Methotrexate every 2 weeks  Continue Dexamethasone taper = LD will be 5/19/25  Maintain PICC line  H-O following  Had Rituxan 5/10/25  Was due for Methotrexate on 5/17 but labs and VS were concerning for infection so sepsis workup was initiated. Now with recurrence of bacteremia. Chemo on hold for now  Type 2 diabetes mellitus with hyperglycemia, without long-term current use of insulin ([facility])  Hemoglobin A1C was 6.6 on 2/22/25  Sees End[facility] in [place]  Home:  Janumet XR  qd  Here: " Lispro 6 U TID/Metformin XR 1000 mg qd/Januvia 50mg qd  Restarted Januvia 50 mg qd on 5/14/25 and stopped the Lantus 5/13/25.  Kept Lispro WM same.  Continue DM diet  On QID Accuchecks with SSI Algo 4 = changed to Algo 1  BSs should continue to improve with steroid being tapered (LD will be 5/19)  5/17 creat bumped up to 1.38. will hold metformin for now. Continue SSI  5/18- monitor closely for signs of hypo/hyperglycemia in setting of bacteremia. So far he is still eating well  Mixed hyperlipidemia  Continue atorvastatin  Did have slightly elevated LFT's on prior labs but most recently normal, consider holding statin if LFTs trend back up  Thyroid nodule  TSH/free T4 4/21/25 = 0.019/1.13  Nonemergent outpatient endocrine follow-up.   Will require outpatient TSH, free T4, +/- further imaging with endocrine such as radioactive uptake  Repeat TSH/free T4 done 5/12 = TSH was 0.287 with free T4 0.85  Pulmonary nodule  CT chest 3 months follow-up  Liver lesion  Patient will require outpatient follow-up  Encephalopathy  Staff in room states he was more impulsive overnight last night but today resting calmly in bed  Continue Seroquel  Continue steroid taper- last dose 5/19  On 1:1  LETY (acute kidney injury) (HCC)  Monitor status post methotrexate  Peak creat was 2.77 on 4/22/25  Previous baseline as OP 5/2023-2/22/25 was 1.1 to 1.0  CMP 5/9/25 with creatinine of 1.18 down from 1.23 on 5/8 and on 5/10 was 1.15  CMP on 5/12/25 showed creatinine stable at 1.1  On 5/15, creat is up to 1.25 but did drop back down to 1.19  5/17- creat up again to 1.38- metformin placed on hold for now. He is being followed by nephrology. Met sepsis criteria. IVF ordered as per sepsis protocol  5/18- creat trending down, 1.28 today  Sepsis (HCC)  Patient noted to have hypotension and tachycardia on 5/17  Sepsis workup ordered  Now found to have positive blood cultures again, growing E coli as before  ID reconsulted  Started back on cefepime 5/17-  "continue pending sensitivities  Continue IVF  Monitor labs and VS closely  Recurrent gram negative bacteremia  Patient completed 7 days of IV cefazolin which was finished on May 5  Repeat blood cultures done again 5/17 due to hypotension and tachycardia. Again growing E Coli  Started on cefepime 5/17- continue pending results of sensitivities  ID reconsulted- recommended CT scan A/P with contrast looking for possible reason for recurrent bacteremia. Also consider the PICC as a source but per ID since its E Coli again, less likely that PICC is the source. Will continue using PICC for now unless he starts spiking temps    HTN (hypertension)  Home:  Losartan -25 qd  Here:  no meds. He has been hypotensive and on IVF  Transaminitis  AST is 111, previously 114,  ALT is 322 previously 149  D/w H-O, they are aware =  \"Could be due to recent chemotherapy versus antibiotics versus liver lesions\".    CMP 5/9/25 = AST 57 (previously 111),  (previously 322)  As of today- now resolved  Leukopenia  WBC was stable at 5.9 on 5/12/25 but 5/15/25 is down to 1.84  H-O following  Sepsis workup ongoing  Hyponatremia    resolved  Thrombocytopenia (HCC)  Platelet count down to 117 from 160 on 5/12/25 and on 5/15/25  is 115  ?from chemo  Per H-O  Platelets dropped to 66 today, will need platelet transfusion if counts drop below 20K or in setting of any spontaneous bleeding   Anemia  Hgb today dropped to 6.3, was confirmed by repeat lab and came back at 6.7  Discussed with oncology- ordered 1 unit PRBC's. Per ID, OK to use PICC as it is not a likely source for his bacteremia  Spoke with wife over the phone and consent obtained    The above assessment and plan was reviewed and updated as determined by my evaluation of the patient on 5/18/2025.    History of Present Illness   Patient seen and examined. Patients overnight issues or events were reviewed with nursing staff. New or overnight issues include the following: "   Patient resting comfortably in bed. He did wake up to eat breakfast today. Staff reports no new issues    Review of Systems    Objective :  Temp:  [97.5 °F (36.4 °C)-99.1 °F (37.3 °C)] 97.5 °F (36.4 °C)  HR:  [] 75  BP: ()/(42-74) 122/66  Resp:  [16-20] 17  SpO2:  [91 %-100 %] 99 %  O2 Device: None (Room air)    Invasive Devices       Peripherally Inserted Central Catheter Line  Duration             PICC Line 05/01/25 Left Brachial 17 days              Drain  Duration             External Urinary Catheter 15 days                    Physical Exam  General Appearance: NAD; pleasant  HEENT: PERRLA, conjuctiva normal; mucous membranes moist; face symmetrical  Neck:  Supple  Lungs: clear bilaterally, normal respiratory effort, no retractions, expiratory effort normal, on room air  CV: regular rate and rhythm, no murmurs rubs or gallops noted   ABD: soft non tender, +BS x4  EXT: DP pulses intact, no lymphadenopathy, no edema  Skin: normal turgor, normal texture, no rash  Psych: affect normal, mood normal  Neuro: resting comfortably  The above physical exam was reviewed and updated as determined by my evaluation of the patient on 5/18/2025.      Lab Results: I have reviewed the following results:  Results from last 7 days   Lab Units 05/18/25  0856 05/18/25  0543   WBC Thousand/uL 1.84* 1.91*   HEMOGLOBIN g/dL 6.7* 6.3*   HEMATOCRIT % 20.1* 18.9*   PLATELETS Thousands/uL 66* 68*     Results from last 7 days   Lab Units 05/18/25  0543 05/17/25  0537   SODIUM mmol/L 140 136   POTASSIUM mmol/L 3.5 3.8   CHLORIDE mmol/L 105 92*   CO2 mmol/L 30 31   BUN mg/dL 20 25   CREATININE mg/dL 1.28 1.38*   CALCIUM mg/dL 7.4* 8.3*             Results from last 7 days   Lab Units 05/18/25  1026 05/18/25  0600 05/17/25  2054   POC GLUCOSE mg/dl 149* 124 176*           Review of Scheduled Meds: Medications  reviewed and reconciled as needed  Current Facility-Administered Medications   Medication Dose Route Frequency Provider  Last Rate    acetaminophen  650 mg Oral Q6H PRN Crispin Arana MD      allopurinol  300 mg Oral Daily Crispin Arana MD      alteplase  2 mg Intracatheter Q1MIN PRN Crispin Arana MD      alteplase  2 mg Intracatheter Q1MIN PRN Magali Lee MD      aluminum-magnesium hydroxide-simethicone  30 mL Oral Q4H PRN Crispin Arana MD      atorvastatin  20 mg Oral Daily Crispin Arana MD      bisacodyl  10 mg Rectal Daily PRN Skyler Hendrickson MD      calcium carbonate  1,000 mg Oral Daily PRN Crispin Arana MD      cefepime  2,000 mg Intravenous Q12H Jesenia Buenrostro PA-C 2,000 mg (05/18/25 1322)    chlorhexidine  15 mL Mouth/Throat Q12H [facility] Crispin Arana MD      dexamethasone  1 mg Oral Q12H [facility] Crispin Arana MD      docusate sodium  100 mg Oral BID Jessica Arreola, DO      heparin (porcine)  5,000 Units Subcutaneous Q8H [facility] Crispin Arana MD      insulin lispro  1-5 Units Subcutaneous TID AC PATI Porras      insulin lispro  1-5 Units Subcutaneous HS PATI Porras      insulin lispro  6 Units Subcutaneous TID With Meals PATI Porras      lactulose  20 g Oral Daily PRN Joshua Kaminski DO      melatonin  6 mg Oral HS Crispin Arana MD      [Held by provider] metFORMIN  1,000 mg Oral Daily Crispin Arana MD      multi-electrolyte  100 mL/hr Intravenous Continuous Griffin Sheikh,  mL/hr (05/18/25 1322)    OLANZapine  5 mg Intramuscular BID PRN Crispin Arana MD      ondansetron  4 mg Intravenous Q6H PRN Crispin Arana MD      oxyCODONE  2.5 mg Oral Q4H PRN Crispin Arana MD      Or    oxyCODONE  5 mg Oral Q4H PRN Crispin Arana MD      pantoprazole  40 mg Oral Early Morning Crispin Arana MD      polyethylene glycol  17 g Oral Daily PRN Skyler Hendrickson MD      QUEtiapine  12.5 mg Oral Daily Crispin Arana MD      QUEtiapine  50 mg Oral HS Crispin Arana MD      senna  2 tablet Oral Daily With Lunch Jessica Arreola, DO      sitaGLIPtin  50 mg Oral Daily Roseanne  PATI Clark         VTE Pharmacologic Prophylaxis: heparin  Code Status: Level 1 - Full Code  Current Length of Stay: 11 day(s)    Administrative Statements     ** Please Note:  voice to text software may have been used in the creation of this document. Although proof errors in transcription or interpretation are a potential of such software**

## 2025-05-18 NOTE — PLAN OF CARE
Problem: PAIN - ADULT  Goal: Verbalizes/displays adequate comfort level or baseline comfort level  Description: Interventions:- Encourage patient to monitor pain and request assistance- Assess pain using appropriate pain scale- Administer analgesics based on type and severity of pain and evaluate response- Implement non-pharmacological measures as appropriate and evaluate response- Consider cultural and social influences on pain and pain management- Notify physician/advanced practitioner if interventions unsuccessful or patient reports new pain  Outcome: Progressing     Problem: INFECTION - ADULT  Goal: Absence or prevention of progression during hospitalization  Description: INTERVENTIONS:- Assess and monitor for signs and symptoms of infection- Monitor lab/diagnostic results- Monitor all insertion sites, i.e. indwelling lines, tubes, and drains- Monitor endotracheal if appropriate and nasal secretions for changes in amount and color- [place] appropriate cooling/warming therapies per order- Administer medications as ordered- Instruct and encourage patient and family to use good hand hygiene technique- Identify and instruct in appropriate isolation precautions for identified infection/condition  Outcome: Progressing     Problem: SAFETY ADULT  Goal: Patient will remain free of falls  Description: INTERVENTIONS:- Educate patient/family on patient safety including physical limitations- Instruct patient to call for assistance with activity - Consult OT/PT to assist with strengthening/mobility - Keep Call bell within reach- Keep bed low and locked with side rails adjusted as appropriate- Keep care items and personal belongings within reach- Initiate and maintain comfort rounds- Make Fall Risk Sign visible to staff- Offer Toileting every 2 Hours, in advance of need- Initiate/Maintain bed alarm- Obtain necessary fall risk management equipment:  - Apply yellow socks and bracelet for high fall risk patients- Consider  moving patient to room near nurses station  Outcome: Progressing  Goal: Maintain or return to baseline ADL function  Description: INTERVENTIONS:-  Assess patient's ability to carry out ADLs; assess patient's baseline for ADL function and identify physical deficits which impact ability to perform ADLs (bathing, care of mouth/teeth, toileting, grooming, dressing, etc.)- Assess/evaluate cause of self-care deficits - Assess range of motion- Assess patient's mobility; develop plan if impaired- Assess patient's need for assistive devices and provide as appropriate- Encourage maximum independence but intervene and supervise when necessary- Involve family in performance of ADLs- Assess for home care needs following discharge - Consider OT consult to assist with ADL evaluation and planning for discharge- Provide patient education as appropriate  Outcome: Progressing  Goal: Maintains/Returns to pre admission functional level  Description: INTERVENTIONS:- Perform AM-PAC 6 Click Basic Mobility/ Daily Activity assessment daily.- Set and communicate daily mobility goal to care team and patient/family/caregiver. - Collaborate with rehabilitation services on mobility goals if consulted- Perform Range of Motion 3 times a day.- Reposition patient every 2 hours.- Dangle patient 3 times a day- Stand patient 3 times a day- Ambulate patient 3 times a day- Out of bed to chair for meals  Outcome: Progressing     Problem: DISCHARGE PLANNING  Goal: Discharge to home or other facility with appropriate resources  Description: INTERVENTIONS:- Identify barriers to discharge w/patient and caregiver- Arrange for needed discharge resources and transportation as appropriate- Identify discharge learning needs (meds, wound care, etc.)- Arrange for interpretive services to assist at discharge as needed- Refer to Case Management Department for coordinating discharge planning if the patient needs post-hospital services based on physician/advanced  practitioner order or complex needs related to functional status, cognitive ability, or social support system  Outcome: Progressing     Problem: Prexisting or High Potential for Compromised Skin Integrity  Goal: Skin integrity is maintained or improved  Description: INTERVENTIONS:- Identify patients at risk for skin breakdown- Assess and monitor skin integrity- Assess and monitor nutrition and hydration status- Monitor labs - Assess for incontinence - Turn and reposition patient- Assist with mobility/ambulation- Relieve pressure over bony prominences- Avoid friction and shearing- Provide appropriate hygiene as needed including keeping skin clean and dry- Evaluate need for skin moisturizer/barrier cream- Collaborate with interdisciplinary team - Patient/family teaching- Consider wound care consult   Outcome: Progressing     Problem: Nutrition/Hydration-ADULT  Goal: Nutrient/Hydration intake appropriate for improving, restoring or maintaining nutritional needs  Description: Monitor and assess patient's nutrition/hydration status for malnutrition. Collaborate with interdisciplinary team and initiate plan and interventions as ordered.  Monitor patient's weight and dietary intake as ordered or per policy. Utilize nutrition screening tool and intervene as necessary. Determine patient's food preferences and provide high-protein, high-caloric foods as appropriate. INTERVENTIONS:- Monitor oral intake, urinary output, labs, and treatment plans- Assess nutrition and hydration status and recommend course of action- Evaluate amount of meals eaten- Assist patient with eating if necessary - Allow adequate time for meals- Recommend/ encourage appropriate diets, oral nutritional supplements, and vitamin/mineral supplements- Order, calculate, and assess calorie counts as needed- Recommend, monitor, and adjust tube feedings and TPN/PPN based on assessed needs- Assess need for intravenous fluids- Provide specific nutrition/hydration  education as appropriate- Include patient/family/caregiver in decisions related to nutrition  Outcome: Progressing

## 2025-05-18 NOTE — ASSESSMENT & PLAN NOTE
Monitor status post methotrexate  Peak creat was 2.77 on 4/22/25  Previous baseline as OP 5/2023-2/22/25 was 1.1 to 1.0  CMP 5/9/25 with creatinine of 1.18 down from 1.23 on 5/8 and on 5/10 was 1.15  CMP on 5/12/25 showed creatinine stable at 1.1  On 5/15, creat is up to 1.25 but did drop back down to 1.19  5/17- creat up again to 1.38- metformin placed on hold for now. He is being followed by nephrology. Met sepsis criteria. IVF ordered as per sepsis protocol  5/18- creat trending down, 1.28 today

## 2025-05-18 NOTE — ASSESSMENT & PLAN NOTE
WBC was stable at 5.9 on 5/12/25 but 5/15/25 is down to 1.84  H-O following  Sepsis workup ongoing

## 2025-05-18 NOTE — ASSESSMENT & PLAN NOTE
Hgb today dropped to 6.3, was confirmed by repeat lab and came back at 6.7  Discussed with oncology- ordered 1 unit PRBC's. Per ID, OK to use PICC as it is not a likely source for his bacteremia  Spoke with wife over the phone and consent obtained

## 2025-05-18 NOTE — ASSESSMENT & PLAN NOTE
Suspected possible sepsis with recent uric acid 4.4, repeat 5.1.  Procalcitonin elevated 7.39  Infectious disease following.  Currently on cefepime.  Blood cultures and urine culture pending  Further management as per primary service  Received normal saline bolus.  Continue with IV fluids.  Adjust rate to 100 cc/h

## 2025-05-18 NOTE — ASSESSMENT & PLAN NOTE
Hematology is following.  Last dose of rituximab given on 5/10 800 mg  Needs high dose methotrexate -currently on hold given suspected sepsis.  Blood cultures and urine culture are pending.  Infectious disease following.  Currently on cefepime.  Will need urinary alkalinization with methotrexate administration.   Previously on sodium bicarb infusion for now we will switch to Isolyte  Goal urine pH is 7.0 or above.

## 2025-05-18 NOTE — ASSESSMENT & PLAN NOTE
Patient noted to have hypotension and tachycardia on 5/17  Sepsis workup ordered  Now found to have positive blood cultures again, growing E coli as before  ID reconsulted  Started back on cefepime 5/17- continue pending sensitivities  Continue IVF  Monitor labs and VS closely

## 2025-05-18 NOTE — ASSESSMENT & PLAN NOTE
Platelet count down to 117 from 160 on 5/12/25 and on 5/15/25  is 115  ?from chemo  Per H-O  Platelets dropped to 66 today, will need platelet transfusion if counts drop below 20K or in setting of any spontaneous bleeding

## 2025-05-18 NOTE — ASSESSMENT & PLAN NOTE
Patient completed 7 days of IV cefazolin which was finished on May 5  Repeat blood cultures done again 5/17 due to hypotension and tachycardia. Again growing E Coli  Started on cefepime 5/17- continue pending results of sensitivities  ID reconsulted- recommended CT scan A/P with contrast looking for possible reason for recurrent bacteremia. Also consider the PICC as a source but per ID since its E Coli again, less likely that PICC is the source. Will continue using PICC for now unless he starts spiking temps

## 2025-05-18 NOTE — ASSESSMENT & PLAN NOTE
Patient previously leukopenic in setting of lymphoma and chemotherapy.  He has had only 1 normal WBC count in the last 3 weeks.  Developing increasing leukopenia since 5/15, though did receive rituximab on 5/10 which may be the cause.  Now with recurrent GNR bacteremia which is likely contributing too.  Status post G-CSFt on 5/17.  -Antibiotic plan as above  -G-CSF management per Oncology  -Continue to monitor CBCD

## 2025-05-18 NOTE — ASSESSMENT & PLAN NOTE
Baseline creatinine 0.8 to 1.1.   Etiology suspected to be ATN +/- prerenal azotemia.   Peak SCr 2.77 on 4/22/25.   Current creatinine fairly stable at 1.2

## 2025-05-18 NOTE — ASSESSMENT & PLAN NOTE
Patient developing leukopenia again on 5/15, along with new tachycardia and transient hypotension which was fluid responsive.  Lactate is elevated, procalcitonin elevated.  Both lactate and procalcitonin may be elevated in setting of lymphoma as well. Received rituximab on 5/10 which could contribute to leukopenia.  He does endorse urinary hesitancy and per nursing had very foul-smelling urine; consider possibility of recurrent UTI. UA does have pyuria. Blood cultures now also growing GNR. Consider PICC line infection which was placed 5/01, though does not examine infected externally.  He otherwise is hemodynamically stable.  -Continue IV cefepime 2 g every 12 hours  -Follow-up blood cultures  -Follow-up urine culture  -As below recommend CT A/P  -If patient becomes hemodynamically unstable would then need to remove PICC line  -Continue to monitor vital signs  -Repeat CBC + diff tomorrow to trend WBC and ANC

## 2025-05-18 NOTE — ASSESSMENT & PLAN NOTE
Patient developing leukopenia again on 5/15, along with new tachycardia and transient hypotension which was fluid responsive.  Lactate is elevated, procalcitonin elevated.  Both lactate and procalcitonin may be elevated in setting of lymphoma which is being treated. Received rituximab on 5/10 which could contribute to leukopenia.  He does endorse urinary hesitancy and per nursing had very foul-smelling urine; consider possibility of recurrent UTI.  He otherwise is hemodynamically stable and on room air without any other obvious source of infection at this time. Does have a PICC line in place, placed on 5/1, but does not examine as infected.  Cefepime initiated 5/17 as patient is immunosuppressed and at risk for more resistant GNR's given prolonged hospitalization.  -Continue IV cefepime 2 g every 12 hours  -Follow-up blood cultures  -Follow-up urine culture  -Please check PVR to ensure patient is not retaining urine  -If cultures negative and patient remained stable, hope to de-escalate antibiotics soon  -Continue to monitor vital signs  -Repeat CBC + diff tomorrow to trend WBC and ANC

## 2025-05-18 NOTE — PROGRESS NOTES
NEPHROLOGY HOSPITAL PROGRESS NOTE   Alexis Dennison 64 y.o. male MRN: [id number]  Unit/Bed#: -01 Encounter: [id number]  Reason for Consult: LETY    Assessment & Plan  LETY (acute kidney injury) ([facility])  Baseline creatinine 0.8 to 1.1.   Etiology suspected to be ATN +/- prerenal azotemia.   Peak SCr 2.77 on 4/22/25.   Current creatinine fairly stable at 1.2    Primary central nervous system (CNS) lymphoma  Hematology is following.  Last dose of rituximab given on 5/10 800 mg  Needs high dose methotrexate -currently on hold given suspected sepsis.  Blood cultures and urine culture are pending.  Infectious disease following.  Currently on cefepime.  Will need urinary alkalinization with methotrexate administration.   Previously on sodium bicarb infusion for now we will switch to Isolyte  Goal urine pH is 7.0 or above.   HTN (hypertension)  BP is at goal.   Not on BP meds.     SIRS (systemic inflammatory response syndrome) ([facility])  Suspected possible sepsis with recent uric acid 4.4, repeat 5.1.  Procalcitonin elevated 7.39  Infectious disease following.  Currently on cefepime.  Blood cultures and urine culture pending  Further management as per primary service  Received normal saline bolus.  Continue with IV fluids.  Adjust rate to 100 cc/h    Summary:    Overall renal function remains stable with a creatinine of 1.28  Ongoing antibiotic treatment as per infectious disease  Pending CT of the chest abdomen pelvis  Methotrexate therapy currently on hold.    SUBJECTIVE / 24H INTERVAL HISTORY:  Seen and examined.  Family at bedside.  Currently without complaints.  Denies any chest pain shortness of breath or abdominal pain.    OBJECTIVE:  Current Weight: Weight - Scale: 74.3 kg (163 lb 11.2 oz)  Vitals:    05/17/25 1940 05/18/25 0446 05/18/25 1019 05/18/25 1340   BP: (!) 101/42 122/55  122/66   BP Location: Right arm Right arm  Right arm   Pulse: 92 80  75   Resp: 20 20  17   Temp: 98.3 °F (36.8 °C) 98.2 °F (36.8 °C)  97.5  "°F (36.4 °C)   TempSrc: Oral Oral  Oral   SpO2: 96% 93%  99%   Weight:   74.3 kg (163 lb 11.2 oz)    Height:           Intake/Output Summary (Last 24 hours) at 5/18/2025 1353  Last data filed at 5/18/2025 1236  Gross per 24 hour   Intake 477 ml   Output 775 ml   Net -298 ml       Physical Exam  Constitutional:       Appearance: He is not ill-appearing.     Cardiovascular:      Rate and Rhythm: Normal rate and regular rhythm.   Pulmonary:      Effort: Pulmonary effort is normal.      Breath sounds: Normal breath sounds.   Abdominal:      General: There is no distension.      Palpations: Abdomen is soft.     Musculoskeletal:      Right lower leg: No edema.      Left lower leg: No edema.     Skin:     General: Skin is warm and dry.      Findings: No rash.     Neurological:      Mental Status: He is alert. Mental status is at baseline.         Medications:  Current Medications[1]    Laboratory Results:  Results from last 7 days   Lab Units 05/18/25  0856 05/18/25  0543 05/17/25  0941 05/17/25  0537 05/16/25  0618 05/15/25  0600 05/12/25  0614   WBC Thousand/uL 1.84* 1.91* 1.26*  --   --  3.35* 5.16   HEMOGLOBIN g/dL 6.7* 6.3* 8.5*  --   --  9.0* 9.3*   HEMATOCRIT % 20.1* 18.9* 24.1*  --   --  26.1* 26.4*   PLATELETS Thousands/uL 66* 68* 85*  --   --  115* 117*   POTASSIUM mmol/L  --  3.5  --  3.8 4.2 3.9 4.3   CHLORIDE mmol/L  --  105  --  92* 98 98 101   CO2 mmol/L  --  30  --  31 29 29 30   BUN mg/dL  --  20  --  25 27* 27* 33*   CREATININE mg/dL  --  1.28  --  1.38* 1.19 1.25 1.11   CALCIUM mg/dL  --  7.4*  --  8.3* 8.8 8.5 9.0   MAGNESIUM mg/dL  --   --   --   --   --   --  1.4*   PHOSPHORUS mg/dL  --   --   --   --   --   --  2.7       Portions of the record may have been created with voice recognition software. Occasional wrong word or \"sound a like\" substitutions may have occurred due to the inherent limitations of voice recognition software. Read the chart carefully and recognize, using context, where " substitutions have occurred.If you have any questions, please contact the dictating provider.       [1]   Current Facility-Administered Medications:     acetaminophen (TYLENOL) tablet 650 mg, 650 mg, Oral, Q6H PRN, Crispin Arana MD, 650 mg at 05/16/25 0844    allopurinol (ZYLOPRIM) tablet 300 mg, 300 mg, Oral, Daily, Crispin Arana MD, 300 mg at 05/18/25 0832    alteplase (CATHFLO) injection 2 mg, 2 mg, Intracatheter, Q1MIN PRN, Crispin Arana MD    alteplase (CATHFLO) injection 2 mg, 2 mg, Intracatheter, Q1MIN PRN, Magali Lee MD    aluminum-magnesium hydroxide-simethicone (MAALOX) oral suspension 30 mL, 30 mL, Oral, Q4H PRN, Crispin Arana MD    atorvastatin (LIPITOR) tablet 20 mg, 20 mg, Oral, Daily, Crispin Arana MD, 20 mg at 05/18/25 0832    bisacodyl (DULCOLAX) rectal suppository 10 mg, 10 mg, Rectal, Daily PRN, Skyler Hendrickson MD    calcium carbonate (TUMS) chewable tablet 1,000 mg, 1,000 mg, Oral, Daily PRN, Crispin Arana MD    ceFEPime (MAXIPIME) 2,000 mg in dextrose 5 % 50 mL IVPB, 2,000 mg, Intravenous, Q12H, Jesenia Buenrostro PA-C, Last Rate: 100 mL/hr at 05/18/25 1322, 2,000 mg at 05/18/25 1322    chlorhexidine (PERIDEX) 0.12 % oral rinse 15 mL, 15 mL, Mouth/Throat, Q12H CAIT, Crispin Arana MD, 15 mL at 05/17/25 2156    [COMPLETED] dexamethasone (DECADRON) tablet 4 mg, 4 mg, Oral, Q12H CAIT, 4 mg at 05/11/25 0908 **FOLLOWED BY** [COMPLETED] dexamethasone (DECADRON) tablet 2 mg, 2 mg, Oral, Q12H CAIT, 2 mg at 05/15/25 0803 **FOLLOWED BY** dexamethasone (DECADRON) tablet 1 mg, 1 mg, Oral, Q12H CAIT, Crispin Arana MD, 1 mg at 05/18/25 0832    docusate sodium (COLACE) capsule 100 mg, 100 mg, Oral, BID, Jessica Arreola DO, 100 mg at 05/18/25 0832    heparin (porcine) subcutaneous injection 5,000 Units, 5,000 Units, Subcutaneous, Q8H Crispin CHAVIRA MD, 5,000 Units at 05/18/25 1321    insulin lispro (HumALOG/ADMELOG) 100 units/mL subcutaneous injection 1-5 Units, 1-5 Units, Subcutaneous, TID AC, 1  Units at 05/17/25 1657 **AND** Fingerstick Glucose (POCT), , , TID AC, PATI Porras    insulin lispro (HumALOG/ADMELOG) 100 units/mL subcutaneous injection 1-5 Units, 1-5 Units, Subcutaneous, HS, PATI Porras, 1 Units at 05/17/25 2158    insulin lispro (HumALOG/ADMELOG) 100 units/mL subcutaneous injection 6 Units, 6 Units, Subcutaneous, TID With Meals, PATI Porras, 6 Units at 05/18/25 1153    lactulose (CHRONULAC) oral solution 20 g, 20 g, Oral, Daily PRN, Joshua Kaminski DO, 20 g at 05/14/25 1805    melatonin tablet 6 mg, 6 mg, Oral, HS, Crispin Arana MD, 6 mg at 05/17/25 2156    [Held by provider] metFORMIN (GLUCOPHAGE-XR) 24 hr tablet 1,000 mg, 1,000 mg, Oral, Daily, Crispin Arana MD, 1,000 mg at 05/17/25 0829    multi-electrolyte (Plasmalyte-A/Isolyte-S PH 7.4/Normosol-R) IV solution, 150 mL/hr, Intravenous, Continuous, Griffin Sheikh DO, Last Rate: 150 mL/hr at 05/18/25 1322, 150 mL/hr at 05/18/25 1322    OLANZapine (ZyPREXA) IM injection 5 mg, 5 mg, Intramuscular, BID PRN, Crispin Arana MD    ondansetron (ZOFRAN) injection 4 mg, 4 mg, Intravenous, Q6H PRN, Crispin Arana MD    oxyCODONE (ROXICODONE) split tablet 2.5 mg, 2.5 mg, Oral, Q4H PRN, 2.5 mg at 05/16/25 0844 **OR** oxyCODONE (ROXICODONE) IR tablet 5 mg, 5 mg, Oral, Q4H PRN, Crispin Arana MD, 5 mg at 05/18/25 0221    pantoprazole (PROTONIX) EC tablet 40 mg, 40 mg, Oral, Early Morning, Crispin Arana MD, 40 mg at 05/18/25 0551    polyethylene glycol (MIRALAX) packet 17 g, 17 g, Oral, Daily PRN, Skyler Hendrickson MD    QUEtiapine (SEROquel) tablet 12.5 mg, 12.5 mg, Oral, Daily, Crispin Arana MD, 12.5 mg at 05/18/25 1153    QUEtiapine (SEROquel) tablet 50 mg, 50 mg, Oral, HS, Crispin Arana MD, 50 mg at 05/17/25 2156    senna (SENOKOT) tablet 17.2 mg, 2 tablet, Oral, Daily With Lunch, Jessica Arreola DO, 17.2 mg at 05/18/25 1153    sitaGLIPtin (JANUVIA) tablet 50 mg, 50 mg, Oral, Daily, Roseanne  PATI Clark, 50 mg at 05/18/25 0893

## 2025-05-18 NOTE — ASSESSMENT & PLAN NOTE
Patient had positive blood culture on 4/29 with E. coli resistant only to ampicillin and Bactrim.  Patient completed 7 days of IV antibiotic on 5/5.  Source was felt to be UTI.  CT abdomen/pelvis was unremarkable at that time, but was non-con.  Previous PICC line was removed.  New PICC line placed on 5/1. He now has GNRs in blood again on 5/17. Consider recurrent UTI; UA is with pyuria.  Per nursing, PVRs have been normal thus no obvious urinary retention if this GNR is not E. Coli then may also need to consider if PICC line could be source.  -Continue Cefepime 2g every 12 hours  -Follow up official ID of blood cx GNR  -Follow up urine cx to see if matches blood  -Recommend CT A/P ideally with IV contrast; if Nephrology does not want contrast then non-con is ok  -May need to consider PICC line removal if blood and urine cultures do not match or if patient becomes hemodynamically unstable

## 2025-05-18 NOTE — ASSESSMENT & PLAN NOTE
Staff in room states he was more impulsive overnight last night but today resting calmly in bed  Continue Seroquel  Continue steroid taper- last dose 5/19  On 1:1

## 2025-05-18 NOTE — PROGRESS NOTES
Progress Note - Infectious Disease   Name: Alexis Dennison 64 y.o. male I MRN: [id number]  Unit/Bed#: -01 I Date of Admission: 5/7/2025   Date of Service: 5/18/2025 I Hospital Day: 11     Assessment & Plan  Sepsis (HCC)  Patient developing leukopenia again on 5/15, along with new tachycardia and transient hypotension which was fluid responsive.  Lactate is elevated, procalcitonin elevated.  Both lactate and procalcitonin may be elevated in setting of lymphoma as well. Received rituximab on 5/10 which could contribute to leukopenia.  He does endorse urinary hesitancy and per nursing had very foul-smelling urine; consider possibility of recurrent UTI. UA does have pyuria. Blood cultures now also growing GNR. Consider PICC line infection which was placed 5/01, though does not examine infected externally.  He otherwise is hemodynamically stable.  -Continue IV cefepime 2 g every 12 hours  -Follow-up blood cultures  -Follow-up urine culture  -As below recommend CT A/P  -If patient becomes hemodynamically unstable would then need to remove PICC line  -Continue to monitor vital signs  -Repeat CBC + diff tomorrow to trend WBC and ANC  Recurrent gram negative bacteremia  Patient had positive blood culture on 4/29 with E. coli resistant only to ampicillin and Bactrim.  Patient completed 7 days of IV antibiotic on 5/5.  Source was felt to be UTI.  CT abdomen/pelvis was unremarkable at that time, but was non-con.  Previous PICC line was removed.  New PICC line placed on 5/1. He now has GNRs in blood again on 5/17. Consider recurrent UTI; UA is with pyuria.  Per nursing, PVRs have been normal thus no obvious urinary retention if this GNR is not E. Coli then may also need to consider if PICC line could be source.  -Continue Cefepime 2g every 12 hours  -Follow up official ID of blood cx GNR  -Follow up urine cx to see if matches blood  -Recommend CT A/P ideally with IV contrast; if Nephrology does not want contrast then non-con  is ok  -May need to consider PICC line removal if blood and urine cultures do not match or if patient becomes hemodynamically unstable  Leukopenia  Patient previously leukopenic in setting of lymphoma and chemotherapy.  He has had only 1 normal WBC count in the last 3 weeks.  Developing increasing leukopenia since 5/15, though did receive rituximab on 5/10 which may be the cause.  Now with recurrent GNR bacteremia which is likely contributing too.  Status post G-CSFt on 5/17.  -Antibiotic plan as above  -G-CSF management per Oncology  -Continue to monitor CBCD  Primary central nervous system (CNS) lymphoma  Diagnosed by LP, brain biopsy.  Complicated by obstructive hydrocephalus, status post EVD 4/13 through 4/19.  Status post Rituxan, high-dose methotrexate and intrathecal cytarabine on 4/17.  Received repeat dose of Rituxan on 5/10.  -Ongoing oncology recommendations  Type 2 diabetes mellitus with hyperglycemia, without long-term current use of insulin (HCC)  Lab Results   Component Value Date    HGBA1C 6.6 (H) 02/22/2025   Relatively well-controlled, though remains a risk factor for infection.  -Tight glycemic control, particularly while patient remains on steroid  LETY (acute kidney injury) (HCC)  Baseline creatinine 0.8-1.1.  Peaked on 4/22 at 2.7.  Nephrology suspected prerenal or ATN.  Creatinine has been fluctuating but overall relatively stable.  -Continue to monitor  -Ongoing nephrology recommendations  Thrombocytopenia (HCC)  In setting of CNS lymphoma, patient also receiving recent rituximab.  -Continue to monitor CBC  Encephalopathy  Multifactorial due to underlying CNS lymphoma, medications, prolonged hospitalization in elderly patient, steroid use.  - Continue to monitor  Liver lesion  CT 4/30 noted a 1 cm cyst on the right lobe.  - Outpatient follow-up    I have discussed the above management plan in detail with the primary service.  They agree with plan to continue IV cefepime, follow-up blood and  urine cultures, obtain CT A/P.    Antibiotics:  Cefepime: 2    Subjective   Patient remains without fever.  WBC 1.9 this morning.  He was given a dose of filgrastim yesterday, ordered for another dose this morning.  Blood and urine culture still in process.    Patient denies any fevers this morning.  Denies abdominal pain, nausea or emesis.  He says he feels a little off but cannot further elaborate.    Discussion with nursing, PVRs have been normal.    Objective :  Temp:  [97.9 °F (36.6 °C)-99.1 °F (37.3 °C)] 98.2 °F (36.8 °C)  HR:  [] 80  BP: ()/(42-74) 122/55  Resp:  [16-20] 20  SpO2:  [91 %-100 %] 93 %  O2 Device: None (Room air)    General:  No acute distress  Psychiatric:  Awake and alert  Pulmonary:  Normal respiratory excursion without accessory muscle use  Abdomen:  Soft, nontender  Extremities:  No edema  Skin:  No rashes      Lab Results: I have reviewed the following results:  Results from last 7 days   Lab Units 05/18/25  0543 05/17/25  0941 05/15/25  0600   WBC Thousand/uL 1.91* 1.26* 3.35*   HEMOGLOBIN g/dL 6.3* 8.5* 9.0*   PLATELETS Thousands/uL 68* 85* 115*     Results from last 7 days   Lab Units 05/18/25  0543 05/17/25  0537 05/16/25  0618 05/15/25  0600 05/12/25  0614   SODIUM mmol/L 140 136 135   < > 136   POTASSIUM mmol/L 3.5 3.8 4.2   < > 4.3   CHLORIDE mmol/L 105 92* 98   < > 101   CO2 mmol/L 30 31 29   < > 30   BUN mg/dL 20 25 27*   < > 33*   CREATININE mg/dL 1.28 1.38* 1.19   < > 1.11   EGFR ml/min/1.73sq m 58 53 63   < > 69   CALCIUM mg/dL 7.4* 8.3* 8.8   < > 9.0   AST U/L 13 17  --   --  14   ALT U/L 28 44  --   --  103*   ALK PHOS U/L 67 66  --   --  75   ALBUMIN g/dL 2.6* 3.4*  --   --  3.4*    < > = values in this interval not displayed.     Results from last 7 days   Lab Units 05/17/25  1217   BLOOD CULTURE  Received in Microbiology Lab. Culture in Progress.  Received in Microbiology Lab. Culture in Progress.     Results from last 7 days   Lab Units 05/18/25  0548  05/17/25  1217   PROCALCITONIN ng/ml 6.55* 7.39*

## 2025-05-18 NOTE — ASSESSMENT & PLAN NOTE
Hemoglobin A1C was 6.6 on 2/22/25  Sees [facility] in [place]  Home:  Janumet XR  qd  Here: Lispro 6 U TID/Metformin XR 1000 mg qd/Januvia 50mg qd  Restarted Januvia 50 mg qd on 5/14/25 and stopped the Lantus 5/13/25.  Kept Lispro WM same.  Continue DM diet  On QID Accuchecks with SSI Algo 4 = changed to Algo 1  BSs should continue to improve with steroid being tapered (LD will be 5/19)  5/17 creat bumped up to 1.38. will hold metformin for now. Continue SSI  5/18- monitor closely for signs of hypo/hyperglycemia in setting of bacteremia. So far he is still eating well

## 2025-05-18 NOTE — ASSESSMENT & PLAN NOTE
"Patient with development of worsening confusion, and was seen in the ED on 3/24/2025.  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  Lymphoma/leukemia panel was nondiagnostic  CTH 4/3/25: concerning for worsening hydrocephalus.   Repeat LP 4/7/25 = undiagnostic again   MRI brain 4/11/25: \"Interval enlargement of the dominant left anterior basal ganglionic mass lesion with greater surrounding vasogenic edema and mass effect. 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  S/p EVD, self removed 4/26  S/p Keppra 500mg BID x 7 days for postoperative seizure ppx   Started on chemotherapy during hospital stay and is for weekly Rituximab and Methotrexate every 2 weeks  Continue Dexamethasone taper = LD will be 5/19/25  Maintain PICC line  H-O following  Had Rituxan 5/10/25  Was due for Methotrexate on 5/17 but labs and VS were concerning for infection so sepsis workup was initiated. Now with recurrence of bacteremia. Chemo on hold for now  "

## 2025-05-19 LAB
ABO GROUP BLD BPU: NORMAL
ALBUMIN SERPL BCG-MCNC: 2.8 G/DL (ref 3.5–5)
ALP SERPL-CCNC: 55 U/L (ref 34–104)
ALT SERPL W P-5'-P-CCNC: 25 U/L (ref 7–52)
ANION GAP SERPL CALCULATED.3IONS-SCNC: 6 MMOL/L (ref 4–13)
ANISOCYTOSIS BLD QL SMEAR: PRESENT
AST SERPL W P-5'-P-CCNC: 11 U/L (ref 13–39)
BACTERIA UR CULT: ABNORMAL
BASOPHILS # BLD MANUAL: 0.04 THOUSAND/UL (ref 0–0.1)
BASOPHILS NFR MAR MANUAL: 1 % (ref 0–1)
BILIRUB SERPL-MCNC: 0.63 MG/DL (ref 0.2–1)
BPU ID: NORMAL
BUN SERPL-MCNC: 19 MG/DL (ref 5–25)
CALCIUM ALBUM COR SERPL-MCNC: 8.9 MG/DL (ref 8.3–10.1)
CALCIUM SERPL-MCNC: 7.9 MG/DL (ref 8.4–10.2)
CHLORIDE SERPL-SCNC: 107 MMOL/L (ref 96–108)
CO2 SERPL-SCNC: 27 MMOL/L (ref 21–32)
CREAT SERPL-MCNC: 1.2 MG/DL (ref 0.6–1.3)
CROSSMATCH: NORMAL
EOSINOPHIL # BLD MANUAL: 0.08 THOUSAND/UL (ref 0–0.4)
EOSINOPHIL NFR BLD MANUAL: 2 % (ref 0–6)
ERYTHROCYTE [DISTWIDTH] IN BLOOD BY AUTOMATED COUNT: 15.8 % (ref 11.6–15.1)
GFR SERPL CREATININE-BSD FRML MDRD: 63 ML/MIN/1.73SQ M
GLUCOSE P FAST SERPL-MCNC: 157 MG/DL (ref 65–99)
GLUCOSE SERPL-MCNC: 126 MG/DL (ref 65–140)
GLUCOSE SERPL-MCNC: 147 MG/DL (ref 65–140)
GLUCOSE SERPL-MCNC: 157 MG/DL (ref 65–140)
GLUCOSE SERPL-MCNC: 175 MG/DL (ref 65–140)
GLUCOSE SERPL-MCNC: 226 MG/DL (ref 65–140)
HCT VFR BLD AUTO: 23.7 % (ref 36.5–49.3)
HGB BLD-MCNC: 8 G/DL (ref 12–17)
LYMPHOCYTES # BLD AUTO: 0.55 THOUSAND/UL (ref 0.6–4.47)
LYMPHOCYTES # BLD AUTO: 9 % (ref 14–44)
MCH RBC QN AUTO: 31 PG (ref 26.8–34.3)
MCHC RBC AUTO-ENTMCNC: 33.8 G/DL (ref 31.4–37.4)
MCV RBC AUTO: 92 FL (ref 82–98)
METAMYELOCYTE ABSOLUTE CT: 0.16 THOUSAND/UL (ref 0–0.1)
METAMYELOCYTES NFR BLD MANUAL: 4 % (ref 0–1)
MONOCYTES # BLD AUTO: 0.2 THOUSAND/UL (ref 0–1.22)
MONOCYTES NFR BLD: 5 % (ref 4–12)
NEUTROPHILS # BLD MANUAL: 2.91 THOUSAND/UL (ref 1.85–7.62)
NEUTS BAND NFR BLD MANUAL: 21 % (ref 0–8)
NEUTS SEG NFR BLD AUTO: 53 % (ref 43–75)
PLATELET # BLD AUTO: 79 THOUSANDS/UL (ref 149–390)
PLATELET BLD QL SMEAR: ABNORMAL
PMV BLD AUTO: 11 FL (ref 8.9–12.7)
POIKILOCYTOSIS BLD QL SMEAR: PRESENT
POLYCHROMASIA BLD QL SMEAR: PRESENT
POTASSIUM SERPL-SCNC: 3.9 MMOL/L (ref 3.5–5.3)
PROT SERPL-MCNC: 5 G/DL (ref 6.4–8.4)
RBC # BLD AUTO: 2.58 MILLION/UL (ref 3.88–5.62)
SODIUM SERPL-SCNC: 140 MMOL/L (ref 135–147)
SPHEROCYTES BLD QL SMEAR: PRESENT
UNIT DISPENSE STATUS: NORMAL
UNIT PRODUCT CODE: NORMAL
UNIT PRODUCT VOLUME: 250 ML
UNIT RH: NORMAL
VARIANT LYMPHS # BLD AUTO: 5 %
WBC # BLD AUTO: 3.93 THOUSAND/UL (ref 4.31–10.16)

## 2025-05-19 PROCEDURE — 85007 BL SMEAR W/DIFF WBC COUNT: CPT | Performed by: NURSE PRACTITIONER

## 2025-05-19 PROCEDURE — 97129 THER IVNTJ 1ST 15 MIN: CPT

## 2025-05-19 PROCEDURE — 97530 THERAPEUTIC ACTIVITIES: CPT

## 2025-05-19 PROCEDURE — 85027 COMPLETE CBC AUTOMATED: CPT | Performed by: NURSE PRACTITIONER

## 2025-05-19 PROCEDURE — 97110 THERAPEUTIC EXERCISES: CPT

## 2025-05-19 PROCEDURE — 80053 COMPREHEN METABOLIC PANEL: CPT | Performed by: NURSE PRACTITIONER

## 2025-05-19 PROCEDURE — 97112 NEUROMUSCULAR REEDUCATION: CPT

## 2025-05-19 PROCEDURE — 82948 REAGENT STRIP/BLOOD GLUCOSE: CPT

## 2025-05-19 PROCEDURE — 99233 SBSQ HOSP IP/OBS HIGH 50: CPT | Performed by: STUDENT IN AN ORGANIZED HEALTH CARE EDUCATION/TRAINING PROGRAM

## 2025-05-19 PROCEDURE — 99232 SBSQ HOSP IP/OBS MODERATE 35: CPT | Performed by: INTERNAL MEDICINE

## 2025-05-19 PROCEDURE — 97130 THER IVNTJ EA ADDL 15 MIN: CPT

## 2025-05-19 PROCEDURE — G0545 PR INHERENT VISIT TO INPT: HCPCS | Performed by: INTERNAL MEDICINE

## 2025-05-19 PROCEDURE — 99233 SBSQ HOSP IP/OBS HIGH 50: CPT | Performed by: INTERNAL MEDICINE

## 2025-05-19 PROCEDURE — 99232 SBSQ HOSP IP/OBS MODERATE 35: CPT | Performed by: NURSE PRACTITIONER

## 2025-05-19 RX ADMIN — HEPARIN SODIUM 5000 UNITS: 5000 INJECTION INTRAVENOUS; SUBCUTANEOUS at 06:07

## 2025-05-19 RX ADMIN — CHLORHEXIDINE GLUCONATE 15 ML: 1.2 SOLUTION ORAL at 08:07

## 2025-05-19 RX ADMIN — INSULIN LISPRO 6 UNITS: 100 INJECTION, SOLUTION INTRAVENOUS; SUBCUTANEOUS at 08:07

## 2025-05-19 RX ADMIN — HEPARIN SODIUM 5000 UNITS: 5000 INJECTION INTRAVENOUS; SUBCUTANEOUS at 13:43

## 2025-05-19 RX ADMIN — SODIUM CHLORIDE, SODIUM GLUCONATE, SODIUM ACETATE, POTASSIUM CHLORIDE, MAGNESIUM CHLORIDE, SODIUM PHOSPHATE, DIBASIC, AND POTASSIUM PHOSPHATE 100 ML/HR: .53; .5; .37; .037; .03; .012; .00082 INJECTION, SOLUTION INTRAVENOUS at 01:40

## 2025-05-19 RX ADMIN — Medication 6 MG: at 20:21

## 2025-05-19 RX ADMIN — CEFTRIAXONE SODIUM 2000 MG: 10 INJECTION, POWDER, FOR SOLUTION INTRAVENOUS at 20:34

## 2025-05-19 RX ADMIN — INSULIN LISPRO 2 UNITS: 100 INJECTION, SOLUTION INTRAVENOUS; SUBCUTANEOUS at 12:18

## 2025-05-19 RX ADMIN — INSULIN LISPRO 6 UNITS: 100 INJECTION, SOLUTION INTRAVENOUS; SUBCUTANEOUS at 16:09

## 2025-05-19 RX ADMIN — PANTOPRAZOLE SODIUM 40 MG: 40 TABLET, DELAYED RELEASE ORAL at 06:07

## 2025-05-19 RX ADMIN — DOCUSATE SODIUM 100 MG: 100 CAPSULE, LIQUID FILLED ORAL at 08:07

## 2025-05-19 RX ADMIN — INSULIN LISPRO 6 UNITS: 100 INJECTION, SOLUTION INTRAVENOUS; SUBCUTANEOUS at 12:18

## 2025-05-19 RX ADMIN — SENNOSIDES 17.2 MG: 8.6 TABLET, FILM COATED ORAL at 12:18

## 2025-05-19 RX ADMIN — ATORVASTATIN CALCIUM 20 MG: 20 TABLET, FILM COATED ORAL at 08:07

## 2025-05-19 RX ADMIN — DEXAMETHASONE 1 MG: 2 TABLET ORAL at 08:07

## 2025-05-19 RX ADMIN — DOCUSATE SODIUM 100 MG: 100 CAPSULE, LIQUID FILLED ORAL at 16:09

## 2025-05-19 RX ADMIN — SODIUM CHLORIDE, SODIUM GLUCONATE, SODIUM ACETATE, POTASSIUM CHLORIDE, MAGNESIUM CHLORIDE, SODIUM PHOSPHATE, DIBASIC, AND POTASSIUM PHOSPHATE 50 ML/HR: .53; .5; .37; .037; .03; .012; .00082 INJECTION, SOLUTION INTRAVENOUS at 13:33

## 2025-05-19 RX ADMIN — CEFEPIME 2000 MG: 2 INJECTION, POWDER, FOR SOLUTION INTRAVENOUS at 13:33

## 2025-05-19 RX ADMIN — CHLORHEXIDINE GLUCONATE 15 ML: 1.2 SOLUTION ORAL at 20:21

## 2025-05-19 RX ADMIN — SITAGLIPTIN 50 MG: 50 TABLET, FILM COATED ORAL at 08:07

## 2025-05-19 RX ADMIN — INSULIN LISPRO 1 UNITS: 100 INJECTION, SOLUTION INTRAVENOUS; SUBCUTANEOUS at 16:09

## 2025-05-19 RX ADMIN — HEPARIN SODIUM 5000 UNITS: 5000 INJECTION INTRAVENOUS; SUBCUTANEOUS at 21:01

## 2025-05-19 RX ADMIN — CEFEPIME 2000 MG: 2 INJECTION, POWDER, FOR SOLUTION INTRAVENOUS at 01:45

## 2025-05-19 RX ADMIN — QUETIAPINE 12.5 MG: 25 TABLET ORAL at 12:18

## 2025-05-19 RX ADMIN — QUETIAPINE 50 MG: 25 TABLET ORAL at 21:01

## 2025-05-19 RX ADMIN — ALLOPURINOL 300 MG: 300 TABLET ORAL at 08:07

## 2025-05-19 NOTE — ASSESSMENT & PLAN NOTE
Likely due to recent chemo as well as sepsis, bacteremia.  Status post G-CSFt on 5/17.  Improving    Antibiotic plan as above  G-CSF management per Oncology  Continue to monitor CBC

## 2025-05-19 NOTE — DISCHARGE INSTR - AVS FIRST PAGE
DISCHARGE INSTRUCTIONS: [facility] ACUTE REHABILITATION [place]    Bring these instructions with you to your Outpatient Physician appointments so they can order and follow-up any additional lab work or imaging recommended at time of discharge.    Resume follow-up with all your prior providers that you have established care prior to this hospitalization.  Discuss with primary care physician (PCP) if you have additional questions.     It  is you or your caregivers responsibility to obtain follow-up MEDICATION REFILLS  As indicated through your Primary Care Physician (PCP) and other outpatient specialty provider(s) after discharge.  Please follow-up with your PCP as soon as possible after discharge to set-up follow-up management and when appropriate refills.      You remain a fall and injury risk which could be severe.  Your risk of fall has decreased however since admission to acute rehab.  Caregiver training has been completed with our staff.  Appropriate supervision +/- assistance as instructed during your rehab course is recommended to decrease risk of fall and injury.    Continue skilled therapy as discussed after discharge to further decrease this risk    If you (or your health care proxy) have any questions or concerns regarding your acute rehabilitation stay including issues with medications, rehabilitation, and follow-up plan, please call:          [facility] in [place] at [phone] or [phone].      Should you develop fevers, chills, new weakness, changes in sensation, difficulty speaking, facial weakness, confusion, shortness of breath, chest pain, or other concerning symptoms please call 911 and/or obtain transportation to nearest ER immediately.    Should you develop worsening pain, swelling, or drainage notify your surgeon right away or obtain transportation to nearest ER for evaluation.    Should you develop feelings of significant hopelessness,  "severe depression, severe anxiety, or suicide you should call 911 or obtain transportation to nearest ER.    24-7 [employer]wide suicide and crisis lifeline call \"216\"  National Suicide Prevention Lifeline is 1-888.141.4848 and is available 24 hrs/7 days a week.   [facility] [phone] is available 24 hrs/7 days for mental health  [facility] Crisis [phone] is available 24 hrs/7 days for mental health   [facility] [phone]    PHYSICIANS to see:  Please see your doctors listed in the follow up providers section of your discharge paperwork, and take the discharge paperwork with you to your appointments.    Home health has been ordered for you:  Your home health agency should reach out to you or your family soon to arrange follow-up.    (If [facility] home health is your provider their phone number is [phone])    If you are unable to get in touch with home health you may contact your  at [phone]. [place]          SKIN CARE INSTRUCTIONS to follow:  Monitor incision for increased redness, swelling, pain/tenderness, discharge/pus and promptly notify your surgeon should these develop.  Should you develop significant pain, swelling, or drainage obtain transportation to nearest emergency room for immediate evaluation if unable to reach your surgeon promptly   Should you develop uncontrolled pain, fever, chills, sweats, changes in strength, sensation, or color of this area obtain transportation to nearest emergency room for immediate evaluation.      Monitor skin for increased redness or breadown and promptly notify your physician should these develop    If instructed while in [facility] - be sure you stand +/- walk every 1-2 hours and if advised use appropriate supervision/assistance to optimally offload your buttock/sacral region.  While seated or lying in bed shift positions and from side to side often.  Can use barrier type cream such as " Hydraguard 2 times per day and as needed.    Turn patient (yourself) fully every 2 hours while in bed.      Due to the following you are at increased risk of skin breakdown/wounds/worsening wounds:  Impaired mobility  Impaired nutrition/intake/low albumin  Medical co-morbidities    Buttocks/Sacrum  Turn as full as possible off sacrum/buttocks every 2 hours  Use Cushion while in chair or wheelchair  Weight shift every 10-15 minutes while in chair  Keep skin clean and dry as possible.  Remove wet or soiled clothing/linens promptly  Monitor skin for increased breakdown which you are at risk of and notify nursing, PCP, or other physician providers should this occur right away    Heels/bony edges of feet  Float heels off edge of pillow for added pressure relief.  Monitor heels and bony protuberances and notify physician or nursing for any increased redness, bogginess, or breakdown    - Should you develop significant pain, swelling, or drainage obtain transportation to nearest emergency room for immediate evaluation if unable to reach your surgeon promptly   - Should you develop uncontrolled pain, fever, chills, sweats, changes in strength, sensation, or color of this area obtain transportation to nearest emergency room for immediate evaluation.      Driving restrictions:    You are recommended against driving until cleared by an outpatient physician.       You should NOT operate a motor vehicle while under the influence of an opiate medication, muscle relaxant, or other sedative.  Doing so may lead to an accident resulting in serious injury or death to yourself or others.  You have agreed to avoid driving when under the influence of this medication.      Work restrictions:  You should NOT return to work (if working) until cleared by an outpatient physician.    You should not operate heavy machinery (if applicable) until cleared by an outpatient physician.      Alcohol restrictions:  You are recommended to not drink  alcohol at this time unless cleared by an outpatient physician.     Smoking restrictions:  You are recommended to not smoke nicotine.  Smoking increases your risk of heart attack, stroke, emphysema/COPD, and lung cancer.      Cannabis restrictions:  You are recommended to not smoke/ingest/consume/use cannabis/marijuana at this time unless cleared by an outpatient physician.      MEDICATIONS:  Please see a full list of your medications outlined in the After Visit Summary that is attached to these Discharge Instructions.  Please note changes may have been made to your medications please refer to your discharge paperwork for your current medications and take this list with you to all your doctors appointments for your doctors to review.  Please do not resume a home medication unless the medication reconciliation sheet indicates to do so, please do not assume that a medication that you were given a prescription for is the same as a medication you have at home based on both medications having the same name as dosages and frequency may have changed.      Unless specifically noted in your medication list provided to you in your discharge paper work do not resume prior vitamins, minerals, or supplements you may have been taking prior to your hospitalization unless instructed by an outpatient physician in the future.  Discuss with your primary care at next visit if applicable.      AVOID blood thinners and NSAIDs (Non-steroidal anti-inflammatory drugs):  At this time, your medical providers are recommending you do NOT take any blood thinners unless instructed by another physician in the future.  Follow-up with your primary care physician and if applicable appropriate specialists after discharge for follow-up management.  For example: No Aspirin.  No warfarin.  No Eliquis.  No Xarelto.  No Advil.  No Alleve.  No Motrin/Ibuprofen.  No Naproxen.  No meloxicam. No oral diclofenac.  Etc.    Acetaminophen (Tylenol) Dosing Warning:     You may have up to 3000 mg of acetaminophen (Tylenol) from all sources spread out over a 24 hours period.  Do not have more than that, as this can increase your risk of liver injury which can be serious.      NSAID Warning:  Please avoid NSAID (including but not limited to advil, aleve, motrin, naproxen, ibuprofen, mobic, meloxicam, diclofenac etc) medications as NSAID medications may increase your risk of bleeding (which can be life-threatening), stroke, worsening kidney disease, heart attack, developing/worsening GI ulcers, worsening heart failure, worsening liver (cirrhosis) disease, potentially delay bone healing.        Psychotropic Medications (Medications intended to improve mental, cognitive, and functional health)    You were evaluated recently and found to have signs of cognitive impairment that can negatively impact your function and quality of life.      You were managed by both medications and non-pharmacologic (non-medication) methods to try to improve mood sleep pain, cognition, function, and quality of life.      Non-pharmacologic methods included supportive counseling, neuropsychology consultation and management during course, discussion of deep breathing/relaxation/behavioral management techniques, and skilled functional therapies.      To attempt to improve agitation, restlessness insomnia(impaired sleep) labile (unstable) mood wakefulness you were started on quetiapine (Seroquel) with good results.  This medication can slightly increase risk of mortality (death) but prolonged confusion and agitation can also lead to significant side effects and at this time you have been continued on this medication.  You may be weaned off of this medication in future and should follow-up with PCP soon after discharge.      Medications were adjusted during your course based on their effectiveness, tolerability, and safety.  You tolerated the medication adequately during your course without significant  side-effects noted by rehab staff or by yourself (family member).      You have functionally improved significantly and appear to be doing better.  Treatment team (rehab physician, internal medicine team, skilled therapists, nursing, and social work) feel you are adequately appropriate for discharge.  You nevertheless remain at risk for fall, injury, and additional medical and physical complications in the future.  Obtain transportation to nearest hospital from family/friends or call 911 for any concerning new symptoms.      Please follow-up with recommended discharge plan to optimize your mental and physical health and decrease risk of mental and physical health complications and hospital re-admission.      MEDICAL MANAGEMENT AT HOME specific to you:    Diabetes Management:  Please check your blood sugars 4 times daily before meals and at bedtime and record them to provide to your doctors, contact your family doctor as soon as possible for blood sugars higher than 220 or lower than 100.       Follow-up with PCP/family doctor regularly to ensure blood sugars remain adequately controlled.      Monitor for signs or symptoms of low blood sugar (hypoglycemia)- such as sweating, trembling, feeling hungry, worried, more tired (More severe signs of hypoglycemia could be trouble walking, confusion, passing out)  - If present obtain blood sugar    If blood sugar less than 70 take quick source of sugar such as:  - at least half cup of juice   - 4-5 saltine crackers  - 1 tablespoon of sugar or honey  - 3-4 glucose tablets  (Avoid foods that you have history of allergy)    Recheck blood sugar within 30 minutes.  If still low repeat providing additional quick sources of sugar.  If still not improving or symptoms worsening/not improving seek medical attention right away or obtain medical transport/call 911.       Bowel management:  - Ideally you would have 1 well formed BM every 1-2 days.  Adjust bowel regimen based on that goal  or as close to that as possible  - Start with taking miralax 1-2 times per day and senna twice daily  - If still constipated you can take either oral or by rectum dulcolax (but not at the same time)  - If unable to go for more than 4 days notify your physician for additional recommendations    - Follow-up with your primary care physician at next visit  - If you develop significant abdominal pain, nausea/vomiting, or other concerns seek medical attention right away.      For pain - try to use over the counter topicals (creams/gels) as discussed or acetaminophen 1-2 regular or extra-strength Tylenol up to 2-3 times per day.  Could consider heat or ice as well maximum 20 minutes at a time.  Do not use heat or ice if using topicals at the same time.  Notify primary care and/or orthopedics for poorly controlled pain for additional recommendations.      Special Notes:  Allopurinol is new for him per Oncology = given to protect the kidneys from uric acid after getting the Methotrexate  Duricef is to be taken through 5/30/25  Take the Janumet XR only but not the extra Metformin   NO Aspirin  Prilosec is to be 20mg = can't split the 20mg since is extended release  Do not take Losartan HCT for now      Please note a summary of your hospital stay with relevant information for your doctors will try to be sent to them.  Please confirm with your doctors at your follow up visits that they have received this summary and have them contact [facility] Medical Records if they have not received them along with any other medical records they may require.     [facility] Phone Number:  [phone]

## 2025-05-19 NOTE — ASSESSMENT & PLAN NOTE
Recent positive blood culture on 4/29 due to UTI versus PICC, status post 7 days of IV antibiotic thru 5/5.  Now with recurrent.  Consider due to UTI given positive urine culture, increased perinephric stranding seen on CT.  Consider PICC infection, less likely.    Change antibiotics to ceftriaxone  Follow up final susceptibilities and tailor antibiotics as indicated  Follow up final urine cultures  If blood and urine cultures do not match, would consider removing PICC

## 2025-05-19 NOTE — ASSESSMENT & PLAN NOTE
Hematology is following.     Complicated by obstructive hydrocephalus, status post EVD on 4/13 through 4/19.    Status post rituximab, status post high-dose methotrexate and intrathecal cytarabine on 4/17.  Repeat dose of rituximab on 5/10  Needs high dose methotrexate -currently on hold given suspected sepsis.  Blood cultures positive for gram-negative rods and urine culture also positive for gram-negative rods.  Infectious disease following.  Currently on cefepime.  Continue management per infectious disease.  Will need urinary alkalinization with methotrexate administration.   Previously on sodium bicarb infusion, currently on IV Isolyte.  Goal urine pH is 7.0 or above and currently urine pH is 7.5.  Decrease the dose of IV Isolyte to 50 mL/h.  Continue to monitor volume status, if plan to start on methotrexate, will consider switching to bicarb drip

## 2025-05-19 NOTE — NURSING NOTE
Early Detection Sepsis BPA fired for patient. Patient currently being treated for Sepsis. Internal medicine made aware of BPA. Per internal medicine no need to order VS as reflected on BPA.

## 2025-05-19 NOTE — PROGRESS NOTES
"OT daily tx note     05/19/25 8327   Pain Assessment   Pain Assessment Tool 0-10   Pain Score No Pain   Restrictions/Precautions   Precautions 1:1;Bed/chair alarms;Cognitive;Fall Risk;Multiple lines;Pain;Supervision on toilet/commode  (IV active this session)   Weight Bearing Restrictions No   ROM Restrictions No   Lifestyle   Autonomy \"We should be taking notes about everything that she's saying\"   Cognition   Overall Cognitive Status Impaired   Arousal/Participation Responsive;Cooperative   Attention Attends with cues to redirect   Orientation Level Oriented to person;Disoriented to place;Disoriented to situation;Disoriented to time   Memory Decreased recall of biographical information;Decreased recall of recent events;Decreased long term memory;Decreased short term memory;Decreased recall of precautions   Following Commands Follows one step commands with increased time or repetition   Activity Tolerance   Activity Tolerance Patient tolerated treatment well   Assessment   OT Family training done with: Naldo, pt's wife   Assessment of family training Pt and pt's wife Naldo participated in FT to discuss CLOF, role of OT, and recommendations for DC. Pt completing ADLs w/ CG/CS and IADLs specifically meal preparation, housekeeping, laundry, and medication management w/ TA. Pt's functional mobility and transfer status is CGA using RW or no AD w/ gait belt. OT recommending TTB, walker as well as other rec: adult pull-ups, tegaderm (for showers), gait belt, walker skis. Pt had prior FT w/ OT over the weekend but could not complete shower due to IV line connected. This AM, pt was on continuous IV and could not disconnect for shower. OT answered questions related to shower and demo TTB for wife. Wife needed to be re-educated on certain rec that were discussed in previous session but she says she feels comfortable taking him home by Thursday. OT inquired if wife would like to come in for additional FT when a shower can occur " but wife said no. Also HH OT will be scheduled for home so in case wife does have questions she can always ask them/receive more FT in their home. Wife did not know therapies were coming to the home so explained that pt will be receiving  OT, PT, ST. Reiterated importance of pt being w/ someone at all times t/o day even overnight. Wife cont to refuse BSC and will be sleeping in living room on first floor w/ pt and get up when he needs to toilet overnight. Pt and family receptive of information. OT will continue to work on ADL Retraining , LB Dressing, UB dressing, Functional Transfers, Functional Cognition, Functional Attention, Standing tolerance, Standing balance , Gross motor strengthening , Energy conservation training/education, healthy coping education, Leisure and social pursuits, and community re-integration in [facility] for DC home on Thursday.   Prognosis Fair   Problem List Decreased strength;Decreased range of motion;Decreased endurance;Impaired balance;Decreased coordination;Decreased mobility;Decreased cognition;Impaired judgement;Decreased safety awareness   Barriers to Discharge Inaccessible home environment;Decreased caregiver support   Plan   Treatment/Interventions Functional transfer training;ADL retraining;Therapeutic exercise;Endurance training   Progress Progressing toward goals   OT Therapy Minutes   OT Time In 0830   OT Time Out 0910   OT Total Time (minutes) 40   OT Mode of treatment - Individual (minutes) 40   OT Mode of treatment - Concurrent (minutes) 0   OT Mode of treatment - Group (minutes) 0   OT Mode of treatment - Co-treat (minutes) 0   OT Mode of Treatment - Total time(minutes) 40 minutes   OT Cumulative Minutes 715   Therapy Time missed   Time missed? No

## 2025-05-19 NOTE — PROGRESS NOTES
"   05/19/25 0930   Pain Assessment   Pain Assessment Tool 0-10   Pain Score No Pain   Restrictions/Precautions   Precautions 1:1;Bed/chair alarms;Fall Risk;Supervision on toilet/commode;Impulsive;Multiple lines;Cognitive   General   Change In Medical/Functional Status LUE IV   Cognition   Overall Cognitive Status Impaired   Arousal/Participation Responsive;Cooperative   Attention Attends with cues to redirect   Orientation Level Oriented to person   Memory Decreased short term memory;Decreased recall of recent events;Decreased recall of precautions   Following Commands Follows one step commands with increased time or repetition   Subjective   Subjective \"One day at a time I guess\"   Sit to Stand   Type of Assistance Needed Supervision   Comment to RW or no AD   Sit to Stand CARE Score 4   Bed-Chair Transfer   Type of Assistance Needed Incidental touching   Comment CG via gait belt   Chair/Bed-to-Chair Transfer CARE Score 4   Transfer Bed/Chair/Wheelchair   Limitations Noted In Balance;Problem Solving   Adaptive Equipment Roller Walker  (gait belt)   Stand Pivot Contact Guard   Sit to Stand Supervision   Stand to Sit Supervision   Car Transfer   Type of Assistance Needed Incidental touching   Comment gait belt   Car Transfer CARE Score 4   Walk 10 Feet   Type of Assistance Needed Incidental touching   Comment gait belt   Walk 10 Feet CARE Score 4   Walk 50 Feet with Two Turns   Type of Assistance Needed Incidental touching   Comment gait belt   Walk 50 Feet with Two Turns CARE Score 4   Walk 150 Feet   Type of Assistance Needed Incidental touching   Comment gait belt   Walk 150 Feet CARE Score 4   Ambulation   Primary Mode of Locomotion Prior to Admission Walk   Distance Walked (feet) 350 ft  (x2, 150'x1, and multiple shorter distances)   Assist Device   (RW when with spouse, no AD otherwise)   Gait Pattern Inconsistant Vicki;Slow Vicki;Decreased foot clearance;Step through   Limitations Noted In Balance;Heel " Strike;Speed   Provided Assistance with: Direction   Walk Assist Level Contact Guard   Findings CG via gait belt when using RW or no AD   Does the patient walk? 2. Yes   Wheel 50 Feet with Two Turns   Reason if not Attempted Activity not applicable   Wheel 50 Feet with Two Turns CARE Score 9   Wheel 150 Feet   Reason if not Attempted Activity not applicable   Wheel 150 Feet CARE Score 9   Wheelchair mobility   Does the patient use a wheelchair? 0. No   Curb or Single Stair   Style negotiated Single stair   Type of Assistance Needed Incidental touching   Comment gait belt   1 Step (Curb) CARE Score 4   4 Steps   Type of Assistance Needed Incidental touching   Comment gait belt   4 Steps CARE Score 4   12 Steps   Type of Assistance Needed Incidental touching   Comment gait belt   12 Steps CARE Score 4   Stairs   Type Stairs   # of Steps 12   Weight Bearing Precautions Fall Risk   Assist Devices Single Rail   Findings up forward, down backwards with reciprocal pattern on  steps because he has an IV   Toilet Transfer   Type of Assistance Needed Incidental touching   Toilet Transfer CARE Score 4   Toilet Transfer   Surface Assessed Standard Toilet   Limitations Noted In Safety   Adaptive Equipment Grab Bar   Positioning Concerns Safety;Cognition;Grab Bars   Findings incontinent of urine as he started to sit down. brief and pants changed. independent for perineal cleaning   Therapeutic Interventions   Neuromuscular Re-Education Walking with no AD at variable speeds   Assessment   Treatment Assessment Session focused on family training to review car transfers, walking, functional transfers. His spouse did well with set up and positioning of RW and she was in a safe place when walking. He did attempt to step into the car before sitting, but was given a cue to sit first and then spin his legs in. He followed the cue and was able to complete the transfer with CG. He has some mild deviations to his L and R at times, but  he did not lose his balance while walking. He tends to walk closer to things on his R, but he did not bump into anything. He can continue to benefit from skilled PT to maximize his safety and function to promote the highest level of function.   Family/Caregiver Present Yes - spouse, Naldo   PT Family training done with: Naldo   Problem List Decreased strength;Impaired balance;Decreased mobility;Decreased coordination;Decreased cognition;Impaired judgement;Decreased safety awareness   Barriers to Discharge Inaccessible home environment;Decreased caregiver support   PT Barriers   Physical Impairment Decreased strength;Impaired balance;Decreased mobility;Decreased coordination;Decreased cognition;Impaired judgement;Decreased safety awareness   Functional Limitation Car transfers;Stair negotiation;Standing;Transfers;Walking   Plan   Treatment/Interventions ADL retraining;Functional transfer training;LE strengthening/ROM;Elevations;Therapeutic exercise;Endurance training;Cognitive reorientation;Gait training   Progress Progressing toward goals   PT Therapy Minutes   PT Time In 0930   PT Time Out 1030   PT Total Time (minutes) 60   PT Mode of treatment - Individual (minutes) 60   PT Mode of treatment - Concurrent (minutes) 0   PT Mode of treatment - Group (minutes) 0   PT Mode of treatment - Co-treat (minutes) 0   PT Mode of Treatment - Total time(minutes) 60 minutes   PT Cumulative Minutes 686   Therapy Time missed   Time missed? No

## 2025-05-19 NOTE — ASSESSMENT & PLAN NOTE
5/19- Sepsis alert over the weekend.  CT A/P showed perinephric stranding. UA abnormal and Ucx grew GNR in urine. Blood cultures also + for GNR. Lactate 4.4>5.1. procal 7.39> 6.55. Started on IV antibiotics and Chemo on hold per Onc

## 2025-05-19 NOTE — ASSESSMENT & PLAN NOTE
Lab Results   Component Value Date    HGBA1C 6.6 (H) 02/22/2025       Recent Labs     05/18/25  1026 05/18/25  1622 05/18/25 2032 05/19/25  0602   POCGLU 149* 159* 195* 126       Blood Sugar Average: Last 72 hrs:  (P) [id number]    - Metformin 1000mg daily ON HOLD  - Lantus 10u daily with Lispro 8u TID and SSI  - Monitor accuchecks while on steroids  - Management per IM

## 2025-05-19 NOTE — PROGRESS NOTES
Progress Note - Infectious Disease   Name: Alexis Dennison 64 y.o. male I MRN: [id number]  Unit/Bed#: -01 I Date of Admission: 5/7/2025   Date of Service: 5/19/2025 I Hospital Day: 12    Assessment & Plan  Sepsis (formerly [facility])  HR, leukopenia.  Due to bacteremia as below.  No other appreciable source.  LFTs, CT A/P otherwise unremarkable.  Improving with antibiotics.    Continue antibiotics as below  Follow temperatures closely  Recheck WBC in AM to monitor infection  Supportive care as per the primary service  Recurrent E. coli bacteremia  Recent positive blood culture on 4/29 due to UTI versus PICC, status post 7 days of IV antibiotic thru 5/5.  Now with recurrent.  Consider due to UTI given positive urine culture, increased perinephric stranding seen on CT.  Consider PICC infection, less likely.    Change antibiotics to ceftriaxone  Follow up final susceptibilities and tailor antibiotics as indicated  Follow up final urine cultures  If blood and urine cultures do not match, would consider removing PICC  Leukopenia  Likely due to recent chemo as well as sepsis, bacteremia.  Status post G-CSFt on 5/17.  Improving    Antibiotic plan as above  G-CSF management per Oncology  Continue to monitor CBC  Primary central nervous system (CNS) lymphoma  Diagnosed by LP, brain biopsy.  Complicated by obstructive hydrocephalus, status post EVD 4/13 through 4/19.  Status post Rituxan, high-dose methotrexate and intrathecal cytarabine on 4/17.  Received repeat dose of Rituxan on 5/10.  Type 2 diabetes mellitus with hyperglycemia, without long-term current use of insulin (formerly [facility])  Lab Results   Component Value Date    HGBA1C 6.6 (H) 02/22/2025   Relatively well-controlled, though remains a risk factor for infection.        Antibiotics:  Cefepime #3    Subjective   Patient working with PT.  Denies any pain.  No urinary catheter in place.    Objective :  Temp:  [97.3 °F (36.3 °C)-97.6 °F (36.4 °C)] 97.6 °F (36.4 °C)  HR:  [67-79] 74  BP:  (112-130)/(58-69) 130/69  Resp:  [17-18] 18  SpO2:  [94 %-98 %] 98 %  O2 Device: None (Room air)    General:  No acute distress  Psychiatric:  Awake and alert  Pulmonary:  Normal respiratory excursion without accessory muscle use  Abdomen:  Soft, nontender  Extremities:  No edema  Skin:  No rashes      Lab Results: I have reviewed the following results:  Results from last 7 days   Lab Units 05/19/25  0559 05/18/25  0856 05/18/25  0543   WBC Thousand/uL 3.93* 1.84* 1.91*   HEMOGLOBIN g/dL 8.0* 6.7* 6.3*   PLATELETS Thousands/uL 79* 66* 68*     Results from last 7 days   Lab Units 05/19/25  0559 05/18/25  0543 05/17/25  0537   SODIUM mmol/L 140 140 136   POTASSIUM mmol/L 3.9 3.5 3.8   CHLORIDE mmol/L 107 105 92*   CO2 mmol/L 27 30 31   BUN mg/dL 19 20 25   CREATININE mg/dL 1.20 1.28 1.38*   EGFR ml/min/1.73sq m 63 58 53   CALCIUM mg/dL 7.9* 7.4* 8.3*   AST U/L 11* 13 17   ALT U/L 25 28 44   ALK PHOS U/L 55 67 66   ALBUMIN g/dL 2.8* 2.6* 3.4*     Results from last 7 days   Lab Units 05/17/25  1410 05/17/25  1217   BLOOD CULTURE   --  Escherichia coli*  Escherichia coli*   GRAM STAIN RESULT   --  Gram negative rods*  Gram negative rods*   URINE CULTURE  >100,000 cfu/ml Gram Negative Antwan*  --      Results from last 7 days   Lab Units 05/18/25  0543 05/17/25  1217   PROCALCITONIN ng/ml 6.55* 7.39*

## 2025-05-19 NOTE — ASSESSMENT & PLAN NOTE
WBC was stable at 5.9 on 5/12/25 but 5/15/25 is down to 1.84  H-O following  Sepsis workup ongoing but ID felt likely 2/2 Rituxin

## 2025-05-19 NOTE — CASE MANAGEMENT
Clinical update faxed via [employer] to [employer] at [phone]. Awaiting determination.     Pts wife stopped into cm's office and inquired about info she had heard re: a dc on Thursday. Cm confirmed that was the date discussed last week in the family mtg however the medical team needs to re evaluate given pts medical events that occurred over the weekend.

## 2025-05-19 NOTE — ASSESSMENT & PLAN NOTE
Patient completed 7 days of IV cefazolin which was finished on May 5  CT of A/P was done 5/18 per recommendation from ID looking for possible reason for recurrent bacteremia = was unrevealing  Continue Cefepime = plan per ID

## 2025-05-19 NOTE — ASSESSMENT & PLAN NOTE
Diagnosed by LP, brain biopsy.  Complicated by obstructive hydrocephalus, status post EVD 4/13 through 4/19.  Status post Rituxan, high-dose methotrexate and intrathecal cytarabine on 4/17.  Received repeat dose of Rituxan on 5/10.

## 2025-05-19 NOTE — ASSESSMENT & PLAN NOTE
Hemoglobin A1C was 6.6 on 2/22/25  Sees [facility] in [place]  Home:  Janumet XR  qd  Here: Lispro 6 U TID/Januvia 50mg qd  Continue DM diet  On QID Accuchecks with SSI Algo 1  BSs have continued to improve with steroid being tapered  = LD was 5/19 5/17 creat bumped up to 1.38 so Metformin was placed on hold  Creatinine is down to 1.20 today 5/19.  If he stays stable, will restart Metformin and stop the Lispro WM

## 2025-05-19 NOTE — PROGRESS NOTES
05/19/25 0800   Pain Assessment   Pain Assessment Tool 0-10   Pain Score No Pain   Restrictions/Precautions   Precautions 1:1;Bed/chair alarms;Cognitive;Fall Risk;Supervision on toilet/commode   Weight Bearing Restrictions No   ROM Restrictions No   Comprehension   Comprehension (FIM) 3 - Understands basic info/conversation 50-74% of time   Expression   Expression (FIM) 4 - Expresses basic info/needs 75-90% of time   Social Interaction   Social Interaction (FIM) 5 - Interacts appropriately with others 90% of time   Problem Solving   Problem solving (FIM) 1 - Solves basic problems less than 25% of time   Memory   Memory (FIM) 2 - Recognizes, recalls/performs 25-49%   Speech/Language/Cognition Assessmetn   Treatment Assessment Family training/Education  Engaged in family training/education session with pt and pt's wife Naldo. Naldo has been completing ongoing education regarding pt's cognitive linguistic communication deficits and plan for home. Family meeting was held last week with tentative DC date being this Thursday, pending medical status since there were some changes over the weekend. Naldo appeared surprised by date- SLP encouraged to see how today would go with therapy FT and wait for medical update as well from team as discharge may be delayed for medical reasons. However did expressed recommendations for 24hr S/A at home. Naldo asking about pt being alone at night so she can work as well as on the weekends. SLP emphasized that pt is to not be alone due to poor reasoning, safety, insight, communication, and reduced functional mobility making him high fall risk. Naldo expressed she would discuss a schedule with his family. SLP inquiring if Naldo has FMLA she can use in the mean time if she cannot work- Naldo shook her head yes however SLP did not inquire further. Reviewed Memory Tips packet- the different types of memory (LT, ST, and immediate) and areas of deficits currently with pt. Reviewed time when memory  may be worse (in evenings/overnight, when tired, when upset). Reviewed strategies on front of packet including repetition, associations, routines, writing things down, use of calendar and prominent reminders. Discussed ways to implement at home that would help both pt and wife. Wife reported she does not have MyChart but daughter does and manages it. Reviewed how appointments and reminders are sent through this but that any appointments already made would be on discharge summary as well. Discussed plan for HH therapy but also SLP to create HEP for pt to work on as well. All receptive. Pt overall is improving with skilled SLP services and will cont to benefit to maximize overall cognitive linguistic communication abilities at this time.    SLP Therapy Minutes   SLP Time In 0800   SLP Time Out 0830   SLP Total Time (minutes) 30   SLP Mode of treatment - Individual (minutes) 30   SLP Mode of treatment - Concurrent (minutes) 0   SLP Mode of treatment - Group (minutes) 0   SLP Mode of treatment - Co-treat (minutes) 0   SLP Mode of Treatment - Total time(minutes) 30 minutes   SLP Cumulative Minutes 480   Therapy Time missed   Time missed? No

## 2025-05-19 NOTE — ASSESSMENT & PLAN NOTE
Per H-O  Platelets dropped to 66 on 5/18 and today 5/19 up to 79  For platelet transfusion if counts drop below 20K or in setting of any spontaneous bleeding

## 2025-05-19 NOTE — ASSESSMENT & PLAN NOTE
"Patient with development of worsening confusion, and was seen in the ED on 3/24/2025.  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  Lymphoma/leukemia panel was nondiagnostic  CTH 4/3/25: concerning for worsening hydrocephalus.   Repeat LP 4/7/25 = undiagnostic again   MRI brain 4/11/25: \"Interval enlargement of the dominant left anterior basal ganglionic mass lesion with greater surrounding vasogenic edema and mass effect. 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  S/p EVD, self removed 4/26  S/p Keppra 500mg BID x 7 days for postoperative seizure ppx   Started on chemotherapy during hospital stay and is for weekly Rituximab and Methotrexate every 2 weeks  Continue Dexamethasone taper = LD was this AM 5/19/25  Maintain PICC line  H-O following  Had Rituxan 5/10/25  Was due for Methotrexate on 5/17 but labs and VS were concerning for infection so sepsis workup was initiated. Now with recurrence of bacteremia. Chemo on hold for now.    "

## 2025-05-19 NOTE — ASSESSMENT & PLAN NOTE
Lab Results   Component Value Date    HGBA1C 6.6 (H) 02/22/2025   Relatively well-controlled, though remains a risk factor for infection.

## 2025-05-19 NOTE — ASSESSMENT & PLAN NOTE
Hgb dropped to 6.3 on 5/18 and was confirmed by repeat lab  at 6.7 so was transfused with one unit of PRBCs after discussion with Oncology  Spoke with wife over the phone and consent obtained  Today 5/19, hemoglobin is up to 8.0 = appropriate response to transfusion

## 2025-05-19 NOTE — ASSESSMENT & PLAN NOTE
"AST is 111, previously 114,  ALT is 322 previously 149  D/w H-O, they are aware =  \"Could be due to recent chemotherapy versus antibiotics versus liver lesions\".    CMP 5/9/25 = AST 57 (previously 111),  (previously 322)  Resolved  "

## 2025-05-19 NOTE — CASE MANAGEMENT
Received fax confirmation of contd stay from [employer], lcd 5/22 with update due that day or dc on 5/23 with notification being provided.    Received call from Louisa Porter dc planner/coordinator for pt. [phone]. She is aware of pts current status and confirmed [facility] and  [facility] are in network if those services are needed.

## 2025-05-19 NOTE — PROGRESS NOTES
"OT daily tx note     05/19/25 1301   Pain Assessment   Pain Assessment Tool 0-10   Pain Score No Pain   Restrictions/Precautions   Precautions 1:1;Bed/chair alarms;Cognitive;Fall Risk;Supervision on toilet/commode   Weight Bearing Restrictions No   ROM Restrictions No   Lifestyle   Autonomy \"I love me a good cup of coffee\"   Sit to Stand   Type of Assistance Needed Supervision   Physical Assistance Level No physical assistance   Comment CS w/ RW   Sit to Stand CARE Score 4   Bed-Chair Transfer   Type of Assistance Needed Incidental touching   Physical Assistance Level No physical assistance   Comment CG w/o AD gait belt donned   Chair/Bed-to-Chair Transfer CARE Score 4   Exercise Tools   UE Ergometer Pt engaged in UB strengthening using UE ergometer for 5 min prograde and 5 min retrograde with Good tolerance. Pt educated on energy conservation techniques and utilized rest breaks as needed. Pt benefits from activity to improve UB strengthening, activity tolerance, and endurance to facilitate independence w/ ADL/IADLs.   Cognition   Overall Cognitive Status Impaired   Arousal/Participation Alert;Cooperative   Attention Attends with cues to redirect   Orientation Level Oriented to person;Oriented to place;Oriented to situation;Disoriented to time   Memory Decreased recall of recent events;Decreased short term memory;Decreased recall of precautions;Decreased long term memory;Decreased recall of biographical information   Following Commands Follows one step commands with increased time or repetition   Comments Pt engaged in functional cognition activity using ABC puzzle. Pt completed seated at tabletop and able to complete 100% w/ accuracy and inc time. Pt demo better insight during session and decided to re administer the home safety situation cards to assess change over last week. Pt able to appropriately differentiate situation cards x5 using the same cards from last week. 5/5 w/ appropriate responses and better " "insight demo by statements \"you should never take meds w/ alcohol;\" \"oh god that cigarette in bed already means its unsafe;\" \"it's good that person is getting rid of those rugs.\" Despite results from informal assessment, cont to rec 24/7 sup at home due to inconsistencies w/ safety, insight, overall cog. Pt benefits from activity to improve functional cog including STM, working mem, attention, visual scanning to facilitate ind w/ insight into deficits and ADL/IADLs.   Activity Tolerance   Activity Tolerance Patient tolerated treatment well   Assessment   Treatment Assessment Pt engaged in skilled OT session with focus on Functional Transfers, Functional Cognition, Functional Attention, Short Term memory, Standing tolerance, Standing balance , Gross motor strengthening , Energy conservation training/education, healthy coping education, Leisure and social pursuits, and community re-integration. Pt is limited by weakness, impaired balance, decreased endurance, decreased coordination, increased fall risk, decreased ADLS, decreased IADLS, pain, decreased activity tolerance, decreased safety awareness, impaired judgement, and decreased cognition. Session focused on functional cog activities and UB strengthening for endurance training. Pt seemed to demo better insight during sessions and safety situation cards yielded better outcomes compared to previous sessions. Will cont to rec 24/7 sup when at home and Naldo, pt's wife cont to be on board. Upcoming sessions to focus on ADL retraining, balance, UB strengthening, endurance training, and functional cog. OT services are warranted to address above barriers.   Prognosis Fair   Problem List Decreased strength;Decreased endurance;Impaired balance;Decreased mobility;Decreased coordination;Decreased cognition;Impaired judgement;Decreased safety awareness   Barriers to Discharge Inaccessible home environment   Plan   Treatment/Interventions ADL retraining;Functional transfer " training;Therapeutic exercise;Endurance training   Progress Progressing toward goals   OT Therapy Minutes   OT Time In 1300   OT Time Out 1400   OT Total Time (minutes) 60   OT Mode of treatment - Individual (minutes) 60   OT Mode of treatment - Concurrent (minutes) 0   OT Mode of treatment - Group (minutes) 0   OT Mode of treatment - Co-treat (minutes) 0   OT Mode of Treatment - Total time(minutes) 60 minutes   OT Cumulative Minutes 775   Therapy Time missed   Time missed? No

## 2025-05-19 NOTE — ASSESSMENT & PLAN NOTE
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  > Started on high dose steroids and methotrexate every 2 weeks for 4 weeks (C1 received 4/18, C2 received 5/2) then maintenance every 4 weeks with Rituximab weekly for 8 weeks (1st dose Thursday 4/24 and second 5/3)  > Admitted to [facility] with ongoing cognitive deficits and delirium  > Completed Decadron taper on 5/19    - MTX + Rituximab ON HOLD as per Hem/onc  - Daily methotrexate levels when/if medication resumes  - Hem/Onc following  - NSGY following  - SLP for cognition

## 2025-05-19 NOTE — ASSESSMENT & PLAN NOTE
Blood culture urine culture positive for gram-negative rods.  Was found to be tachycardic and hypotensive and had leukopenia.  Continue IV antibiotic per infectious disease

## 2025-05-19 NOTE — PROGRESS NOTES
"   05/19/25 1110   Pain Assessment   Pain Score No Pain   Restrictions/Precautions   Precautions 1:1;Bed/chair alarms;Cognitive;Fall Risk;Supervision on toilet/commode;Impulsive;Limb alert  (L UE IV which was active this session)   Cognition   Arousal/Participation Alert;Cooperative   Attention Attends with cues to redirect   Memory Decreased recall of recent events;Decreased short term memory;Decreased recall of precautions;Decreased long term memory;Decreased recall of biographical information   Following Commands Follows one step commands with increased time or repetition   Subjective   Subjective \"I hope I am going home soon\"   Sit to Stand   Type of Assistance Needed Supervision   Comment with RW or with no AD   Sit to Stand CARE Score 4   Transfer Bed/Chair/Wheelchair   Adaptive Equipment None  (gait belt)   Walk 10 Feet   Type of Assistance Needed Incidental touching;Supervision   Comment CS with RW, CS/CGA with no AD   Walk 10 Feet CARE Score 4   Walk 50 Feet with Two Turns   Type of Assistance Needed Incidental touching;Supervision;Adaptive equipment   Comment CS with RW, CS/CGA with no AD   Walk 50 Feet with Two Turns CARE Score 4   Walk 150 Feet   Type of Assistance Needed Supervision;Adaptive equipment;Incidental touching   Comment CS with RW, CS/CGA with no AD   Walk 150 Feet CARE Score 4   Ambulation   Primary Mode of Locomotion Prior to Admission Walk   Distance Walked (feet) 300 ft  (250 with no AD, 120 feet with RW)   Assist Device Other;Roller Walker  (no AD with gait belt)   Gait Pattern Inconsistant Vicki;Decreased foot clearance;Improper weight shift   Limitations Noted In Balance;Endurance   Provided Assistance with: Balance   Walk Assist Level Contact Guard;Close Supervision   Does the patient walk? 2. Yes   Wheel 50 Feet with Two Turns   Reason if not Attempted Activity not applicable   Wheel 50 Feet with Two Turns CARE Score 9   Wheel 150 Feet   Reason if not Attempted Activity not " applicable   Wheel 150 Feet CARE Score 9   Wheelchair mobility   Does the patient use a wheelchair? 0. No   Picking Up Object   Type of Assistance Needed Supervision   Comment CS directly picking up cones from floor   Picking Up Object CARE Score 4   Therapeutic Interventions   Balance Standing on blue foam ball toss, also had him stand with EC and able to correct sway independently   Neuromuscular Re-Education amb ball toss, forward, backwards and sideways, weaving around cones and then picking up cones with no reacher.   Equipment Use   NuStep LE only X 10 mins L2   Assessment   Treatment Assessment Pt. engaged in 50 mins session and was able to tolerate above activities witgh focused on NMR/balance training without use of AD. Pt. did well with only 1 LOB ntoed during ball toss but was able to correct LOB with no extra assistance needed. He appears to be more steady with no AD but cont to need cues for direction around the unit. No complaints reported by pt. Was assisted back to recliner at end of session with 1:1 present. Cont with PT POC to maximize balance and dec risk for falls, in activity tolerance and strength and safety,  to dec burden of care at d/c.   Problem List Decreased strength;Decreased endurance;Impaired balance;Decreased mobility;Decreased coordination;Decreased cognition;Impaired judgement;Decreased safety awareness   Barriers to Discharge Inaccessible home environment   Barriers to Discharge Comments FT completed today   PT Barriers   Functional Limitation Car transfers;Stair negotiation;Standing;Transfers;Walking   Plan   Treatment/Interventions Functional transfer training;LE strengthening/ROM;Elevations;Therapeutic exercise;Endurance training;Patient/family training;Equipment eval/education;Bed mobility;Gait training   Discharge Recommendation   Rehab Resource Intensity Level, PT   (24/7 S)   Equipment Recommended   (RW + gait belt)   PT Therapy Minutes   PT Time In 1110   PT Time Out 1200    PT Total Time (minutes) 50   PT Mode of treatment - Individual (minutes) 50   PT Mode of treatment - Concurrent (minutes) 0   PT Mode of treatment - Group (minutes) 0   PT Mode of treatment - Co-treat (minutes) 0   PT Mode of Treatment - Total time(minutes) 50 minutes   PT Cumulative Minutes 676   Therapy Time missed   Time missed? No

## 2025-05-19 NOTE — PROGRESS NOTES
"Progress Note - Hospitalist   Name: Alexis Dennison 64 y.o. male I MRN: [id number]  Unit/Bed#: -01 I Date of Admission: 5/7/2025   Date of Service: 5/19/2025 I Hospital Day: 12    Assessment & Plan  Primary central nervous system (CNS) lymphoma  Patient with development of worsening confusion, and was seen in the ED on 3/24/2025.  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  Lymphoma/leukemia panel was nondiagnostic  CTH 4/3/25: concerning for worsening hydrocephalus.   Repeat LP 4/7/25 = undiagnostic again   MRI brain 4/11/25: \"Interval enlargement of the dominant left anterior basal ganglionic mass lesion with greater surrounding vasogenic edema and mass effect. 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  S/p EVD, self removed 4/26  S/p Keppra 500mg BID x 7 days for postoperative seizure ppx   Started on chemotherapy during hospital stay and is for weekly Rituximab and Methotrexate every 2 weeks  Continue Dexamethasone taper = LD was this AM 5/19/25  Maintain PICC line  H-O following  Had Rituxan 5/10/25  Was due for Methotrexate on 5/17 but labs and VS were concerning for infection so sepsis workup was initiated. Now with recurrence of bacteremia. Chemo on hold for now.    Type 2 diabetes mellitus with hyperglycemia, without long-term current use of insulin ([facility])  Hemoglobin A1C was 6.6 on 2/22/25  Sees Endo [facility] in [place]  Home:  Janumet XR  " qd  Here: Lispro 6 U TID/Januvia 50mg qd  Continue DM diet  On QID Accuchecks with SSI Algo 1  BSs have continued to improve with steroid being tapered  = LD was 5/19 5/17 creat bumped up to 1.38 so Metformin was placed on hold  Creatinine is down to 1.20 today 5/19.  If he stays stable, will restart Metformin and stop the Lispro WM  Mixed hyperlipidemia  Continue atorvastatin  Did have slightly elevated LFT's on prior labs but most recently normal, consider holding statin if LFTs trend back up  Thyroid nodule  TSH/free T4 4/21/25 = 0.019/1.13  Nonemergent outpatient endocrine follow-up.   Will require outpatient TSH, free T4, +/- further imaging with endocrine such as radioactive uptake  Repeat TSH/free T4 done 5/12 = TSH was 0.287 with free T4 0.85  Pulmonary nodule  CT chest 3 months follow-up  Liver lesion  Patient will require outpatient follow-up  Encephalopathy  Staff in room states he was more impulsive overnight last night but today resting calmly in bed  Continue Seroquel  Continue steroid taper- last dose 5/19  On 1:1  LETY (acute kidney injury) (HCC)  Monitor status post methotrexate  Peak creat was 2.77 on 4/22/25  Previous baseline as OP 5/2023-2/22/25 was 1.1 to 1.0  CMP 5/9/25 with creatinine of 1.18 down from 1.23 on 5/8 and on 5/10 was 1.15  CMP on 5/12/25 showed creatinine stable at 1.1  5/17: creat up again to 1.38 = Being followed by Nephrology. Met sepsis criteria 5/17 and placed on IVF ordered as per sepsis protocol  5/19: creat trending down and is 1.20 -->  IVF is now reduced to 50ml/hr per renal  Sepsis (HCC)  Patient noted to have hypotension and tachycardia on 5/17  Sepsis workup ordered and found to have positive blood cultures again, growing E coli as before  ID reconsulted  Started Cefepime 5/17/25 = await blood urine cx results but preliminary results of urine >100,000 GNR and prelim blood cx = GNR with blood cx ID panel positive for E.coli  Continue IVF = renal managing  He has had  "stable BP, tachycardia has resolved and he has had no fevers  Await plan update from ID  Recurrent gram negative bacteremia  Patient completed 7 days of IV cefazolin which was finished on May 5  CT of A/P was done 5/18 per recommendation from ID looking for possible reason for recurrent bacteremia = was unrevealing  Continue Cefepime = plan per ID    HTN (hypertension)  Home:  Losartan -25 qd  Here:  no meds.   Stable BP now  Transaminitis  AST is 111, previously 114,  ALT is 322 previously 149  D/w H-O, they are aware =  \"Could be due to recent chemotherapy versus antibiotics versus liver lesions\".    CMP 5/9/25 = AST 57 (previously 111),  (previously 322)  Resolved  Leukopenia  WBC was stable at 5.9 on 5/12/25 but 5/15/25 is down to 1.84  H-O following  Sepsis workup ongoing but ID felt likely 2/2 Rituxin  Hyponatremia  resolved  Thrombocytopenia (HCC)  Per H-O  Platelets dropped to 66 on 5/18 and today 5/19 up to 79  For platelet transfusion if counts drop below 20K or in setting of any spontaneous bleeding   Anemia  Hgb dropped to 6.3 on 5/18 and was confirmed by repeat lab  at 6.7 so was transfused with one unit of PRBCs after discussion with Oncology  Spoke with wife over the phone and consent obtained  Today 5/19, hemoglobin is up to 8.0 = appropriate response to transfusion    The above assessment and plan was reviewed and updated as determined by my evaluation of the patient on 5/19/2025.    History of Present Illness   Patient seen and examined. Patients overnight issues or events were reviewed with nursing staff. New or overnight issues include the following:   No new or overnight issues.  Offers no complaints    Review of Systems   All other systems reviewed and are negative.      Objective :  Temp:  [97.3 °F (36.3 °C)-97.6 °F (36.4 °C)] 97.5 °F (36.4 °C)  HR:  [67-79] 67  BP: (112-123)/(58-67) 123/67  Resp:  [17-18] 17  SpO2:  [94 %-99 %] 94 %  O2 Device: None (Room air)    Invasive " Devices       Peripherally Inserted Central Catheter Line  Duration             PICC Line 05/01/25 Left Brachial 17 days              Drain  Duration             External Urinary Catheter 16 days                    Physical Exam  General Appearance: no distress, non toxic appearing  HEENT: PERRLA, conjuctiva normal; oropharynx clear; mucous membranes moist   Neck:  Supple, normal ROM  Lungs: CTA, normal respiratory effort, no retractions, expiratory effort normal  CV: regular rate and rhythm; no rubs/murmurs/gallops, PMI normal   ABD: soft; ND/NT; +BS  EXT: no edema  Skin: normal turgor, normal texture  Psych: affect normal, mood normal  Neuro: AA        The above physical exam was reviewed and updated as determined by my evaluation of the patient on 5/19/2025.      Lab Results: I have reviewed the following results:  Results from last 7 days   Lab Units 05/19/25  0559 05/18/25  0856   WBC Thousand/uL 3.93* 1.84*   HEMOGLOBIN g/dL 8.0* 6.7*   HEMATOCRIT % 23.7* 20.1*   PLATELETS Thousands/uL 79* 66*     Results from last 7 days   Lab Units 05/19/25  0559 05/18/25  0543   SODIUM mmol/L 140 140   POTASSIUM mmol/L 3.9 3.5   CHLORIDE mmol/L 107 105   CO2 mmol/L 27 30   BUN mg/dL 19 20   CREATININE mg/dL 1.20 1.28   CALCIUM mg/dL 7.9* 7.4*             Results from last 7 days   Lab Units 05/19/25  0602 05/18/25  2032 05/18/25  1622   POC GLUCOSE mg/dl 126 195* 159*       Imaging Results Review: No pertinent imaging studies reviewed.  Other Study Results Review: No additional pertinent studies reviewed.    Review of Scheduled Meds: Medications  reviewed and reconciled as needed  Current Facility-Administered Medications   Medication Dose Route Frequency Provider Last Rate    acetaminophen  650 mg Oral Q6H PRN Crispin Arana MD      allopurinol  300 mg Oral Daily Crispin Arana MD      alteplase  2 mg Intracatheter Q1MIN PRN Crispin Arana MD      alteplase  2 mg Intracatheter Q1MIN PRN Magali Lee MD       aluminum-magnesium hydroxide-simethicone  30 mL Oral Q4H PRN Crispin Arana MD      atorvastatin  20 mg Oral Daily Crispin Arana MD      bisacodyl  10 mg Rectal Daily PRN Skyler Hendrickson MD      calcium carbonate  1,000 mg Oral Daily PRN Crispin Arana MD      cefepime  2,000 mg Intravenous Q12H Jesenia Buenrostro PA-C 2,000 mg (05/19/25 0145)    chlorhexidine  15 mL Mouth/Throat Q12H CAIT Crispin Arana MD      docusate sodium  100 mg Oral BID Jessica Arreola,       heparin (porcine)  5,000 Units Subcutaneous Q8H CAIT Arana MD      insulin lispro  1-5 Units Subcutaneous TID AC PATI Porras      insulin lispro  1-5 Units Subcutaneous HS PATI Porras      insulin lispro  6 Units Subcutaneous TID With Meals PATI Porras      lactulose  20 g Oral Daily PRN Joshua Kaminski,       melatonin  6 mg Oral HS Crispin Arana MD      [Held by provider] metFORMIN  1,000 mg Oral Daily Crispin Arana MD      multi-electrolyte  50 mL/hr Intravenous Continuous Johnny Naik MD 50 mL/hr (05/19/25 0916)    OLANZapine  5 mg Intramuscular BID PRN Crispin Arana MD      ondansetron  4 mg Intravenous Q6H PRN Crispin Arana MD      oxyCODONE  2.5 mg Oral Q4H PRN Crispin Arana MD      Or    oxyCODONE  5 mg Oral Q4H PRN Crispin Arana MD      pantoprazole  40 mg Oral Early Morning Crispin Arana MD      polyethylene glycol  17 g Oral Daily PRN Skyler Hendrickson MD      QUEtiapine  12.5 mg Oral Daily Crispin Arana MD      QUEtiapine  50 mg Oral HS Crispin Arana MD      senna  2 tablet Oral Daily With Lunch Jessica Arreola DO      sitaGLIPtin  50 mg Oral Daily PATI Porras         VTE Pharmacologic Prophylaxis: HSQ  Code Status: Level 1 - Full Code  Current Length of Stay: 12 day(s)    Administrative Statements     ** Please Note:  voice to text software may have been used in the creation of this document. Although proof errors in transcription or interpretation are a  potential of such software**

## 2025-05-19 NOTE — ASSESSMENT & PLAN NOTE
Monitor status post methotrexate  Peak creat was 2.77 on 4/22/25  Previous baseline as OP 5/2023-2/22/25 was 1.1 to 1.0  CMP 5/9/25 with creatinine of 1.18 down from 1.23 on 5/8 and on 5/10 was 1.15  CMP on 5/12/25 showed creatinine stable at 1.1  5/17: creat up again to 1.38 = Being followed by Nephrology. Met sepsis criteria 5/17 and placed on IVF ordered as per sepsis protocol  5/19: creat trending down and is 1.20 -->  IVF is now reduced to 50ml/hr per renal

## 2025-05-19 NOTE — ASSESSMENT & PLAN NOTE
> Pet met sepsis criteria on 4/29 with confusion and agitation. UA was positive. Urine culture and blood cultures showed Ecoli sensitive to cephalosporins.  > ID consulted and pt completed 7d course of ceftriaxone  > Approved to restart chemo 5/2.  > Chemo on hold for GNR UTI and bacteremia    - Monitor while on antibiotics

## 2025-05-19 NOTE — PROGRESS NOTES
Progress Note - Nephrology   Name: Alexis Dennison 64 y.o. male I MRN: [id number]  Unit/Bed#: -01 I Date of Admission: 5/7/2025   Date of Service: 5/19/2025 I Hospital Day: 12    Assessment & Plan  LETY (acute kidney injury) (HCC)  Baseline creatinine 0.8 to 1.1.   Etiology suspected to be due to prerenal azotemia/possible early ATN  Peak SCr 2.77 on 4/22/25.   Renal function improved and now creatinine down to 1.2 mg/dL.    HTN (hypertension)  BP currently stable and is at goal   Not on BP meds.  Avoid hypotension    Primary central nervous system (CNS) lymphoma  Hematology is following.     Complicated by obstructive hydrocephalus, status post EVD on 4/13 through 4/19.    Status post rituximab, status post high-dose methotrexate and intrathecal cytarabine on 4/17.  Repeat dose of rituximab on 5/10  Needs high dose methotrexate -currently on hold given suspected sepsis.  Blood cultures positive for gram-negative rods and urine culture also positive for gram-negative rods.  Infectious disease following.  Currently on cefepime.  Continue management per infectious disease.  Will need urinary alkalinization with methotrexate administration.   Previously on sodium bicarb infusion, currently on IV Isolyte.  Goal urine pH is 7.0 or above and currently urine pH is 7.5.  Decrease the dose of IV Isolyte to 50 mL/h.  Continue to monitor volume status, if plan to start on methotrexate, will consider switching to bicarb drip  Sepsis (HCC)  Blood culture urine culture positive for gram-negative rods.  Was found to be tachycardic and hypotensive and had leukopenia.  Continue IV antibiotic per infectious disease  Anemia  Status post PRBC, hemoglobin today 8.0 g/dL continue management per primary team    I have reviewed the nephrology recommendations including improving, decreased IV fluid to 50 mL/h, with primary team, and we are in agreement with renal plan including the information outlined above.     Subjective   -No new  complaints.  No chest pain or shortness of breath, no nausea or vomiting    Objective :  Temp:  [97.3 °F (36.3 °C)-97.6 °F (36.4 °C)] 97.5 °F (36.4 °C)  HR:  [67-79] 67  BP: (112-123)/(58-67) 123/67  Resp:  [17-18] 17  SpO2:  [94 %-99 %] 94 %  O2 Device: None (Room air)    Current Weight: Weight - Scale: 80 kg (176 lb 5.9 oz) (1 pillow and 1 blanket)  First Weight: Weight - Scale: 75.9 kg (167 lb 6.4 oz)  I/O         05/17 0701  05/18 0700 05/18 0701 05/19 0700 05/19 0701 05/20 0700    P.O. 720 417 180    Blood  385.7     Total Intake(mL/kg) 720 (9.6) 802.7 (10) 180 (2.3)    Urine (mL/kg/hr) 775 (0.4)      Total Output 775      Net -55 +802.7 +180           Unmeasured Urine Occurrence 10 x 1 x     Unmeasured Stool Occurrence  1 x           Physical Exam   General:  Ill looking, awake.  Eyes: Conjunctivae pink,  Sclera anicteric  ENT: lips and mucous membranes moist  Neck: supple   Chest: Clear to Auscultation both lungs,  no crackles, ronchus or wheezing.  CVS: S1 & S2 present, normal rate, regular rhythm, no murmur.  Abdomen: soft, non-tender, non-distended, Bowel sounds normoactive  Extremities: no edema of  legs  Skin: no rash  Neuro: awake, alert, oriented x 3   Psych: Mood and affect appropriate    Medications:  Current Medications[1]      Lab Results: I have reviewed the following results:  Results from last 7 days   Lab Units 05/19/25  0559 05/18/25  0856 05/18/25  0543 05/17/25  0941 05/17/25  0537 05/16/25  0618 05/15/25  0600   WBC Thousand/uL 3.93* 1.84* 1.91* 1.26*  --   --  3.35*   HEMOGLOBIN g/dL 8.0* 6.7* 6.3* 8.5*  --   --  9.0*   HEMATOCRIT % 23.7* 20.1* 18.9* 24.1*  --   --  26.1*   PLATELETS Thousands/uL 79* 66* 68* 85*  --   --  115*   POTASSIUM mmol/L 3.9  --  3.5  --  3.8 4.2 3.9   CHLORIDE mmol/L 107  --  105  --  92* 98 98   CO2 mmol/L 27  --  30  --  31 29 29   BUN mg/dL 19  --  20  --  25 27* 27*   CREATININE mg/dL 1.20  --  1.28  --  1.38* 1.19 1.25   CALCIUM mg/dL 7.9*  --  7.4*  --   "8.3* 8.8 8.5   ALBUMIN g/dL 2.8*  --  2.6*  --  3.4*  --   --        Administrative Statements     Portions of the record may have been created with voice recognition software. Occasional wrong word or \"sound a like\" substitutions may have occurred due to the inherent limitations of voice recognition software. Read the chart carefully and recognize, using context, where substitutions have occurred.If you have any questions, please contact the dictating provider.       [1]   Current Facility-Administered Medications:     acetaminophen (TYLENOL) tablet 650 mg, 650 mg, Oral, Q6H PRN, Crispin Arana MD, 650 mg at 05/16/25 0844    allopurinol (ZYLOPRIM) tablet 300 mg, 300 mg, Oral, Daily, Crispin Arana MD, 300 mg at 05/19/25 0807    alteplase (CATHFLO) injection 2 mg, 2 mg, Intracatheter, Q1MIN PRN, Crispin Arana MD    alteplase (CATHFLO) injection 2 mg, 2 mg, Intracatheter, Q1MIN PRN, Magali Lee MD    aluminum-magnesium hydroxide-simethicone (MAALOX) oral suspension 30 mL, 30 mL, Oral, Q4H PRN, Crispin Arana MD    atorvastatin (LIPITOR) tablet 20 mg, 20 mg, Oral, Daily, Crispin Arana MD, 20 mg at 05/19/25 0807    bisacodyl (DULCOLAX) rectal suppository 10 mg, 10 mg, Rectal, Daily PRN, Skyler Hendrickson MD    calcium carbonate (TUMS) chewable tablet 1,000 mg, 1,000 mg, Oral, Daily PRN, Crispin Arana MD    ceFEPime (MAXIPIME) 2,000 mg in dextrose 5 % 50 mL IVPB, 2,000 mg, Intravenous, Q12H, Jesenia Buenrostro PA-C, Last Rate: 100 mL/hr at 05/19/25 0145, 2,000 mg at 05/19/25 0145    chlorhexidine (PERIDEX) 0.12 % oral rinse 15 mL, 15 mL, Mouth/Throat, Q12H CAIT, Crispin Arana MD, 15 mL at 05/19/25 0807    docusate sodium (COLACE) capsule 100 mg, 100 mg, Oral, BID, Jessica Arreola DO, 100 mg at 05/19/25 0807    heparin (porcine) subcutaneous injection 5,000 Units, 5,000 Units, Subcutaneous, Q8H [facility], Crispin Arana MD, 5,000 Units at 05/19/25 0607    insulin lispro (HumALOG/ADMELOG) 100 units/mL subcutaneous " injection 1-5 Units, 1-5 Units, Subcutaneous, TID AC, 1 Units at 05/18/25 1652 **AND** Fingerstick Glucose (POCT), , , TID AC, PATI Porras    insulin lispro (HumALOG/ADMELOG) 100 units/mL subcutaneous injection 1-5 Units, 1-5 Units, Subcutaneous, HS, PATI Porras, 1 Units at 05/18/25 2213    insulin lispro (HumALOG/ADMELOG) 100 units/mL subcutaneous injection 6 Units, 6 Units, Subcutaneous, TID With Meals, PATI Porras, 6 Units at 05/19/25 0807    lactulose (CHRONULAC) oral solution 20 g, 20 g, Oral, Daily PRN, Joshua Kaminski DO, 20 g at 05/14/25 1805    melatonin tablet 6 mg, 6 mg, Oral, HS, Crispin Arana MD, 6 mg at 05/18/25 2210    [Held by provider] metFORMIN (GLUCOPHAGE-XR) 24 hr tablet 1,000 mg, 1,000 mg, Oral, Daily, Crispin Arana MD, 1,000 mg at 05/17/25 0829    multi-electrolyte (Plasmalyte-A/Isolyte-S PH 7.4/Normosol-R) IV solution, 100 mL/hr, Intravenous, Continuous, Griffin Sheikh DO, Last Rate: 100 mL/hr at 05/19/25 0140, 100 mL/hr at 05/19/25 0140    OLANZapine (ZyPREXA) IM injection 5 mg, 5 mg, Intramuscular, BID PRN, Crispin Arana MD    ondansetron (ZOFRAN) injection 4 mg, 4 mg, Intravenous, Q6H PRN, Crispin Arana MD    oxyCODONE (ROXICODONE) split tablet 2.5 mg, 2.5 mg, Oral, Q4H PRN, 2.5 mg at 05/16/25 0844 **OR** oxyCODONE (ROXICODONE) IR tablet 5 mg, 5 mg, Oral, Q4H PRN, Crispin Arana MD, 5 mg at 05/18/25 0221    pantoprazole (PROTONIX) EC tablet 40 mg, 40 mg, Oral, Early Morning, Crispin Arana MD, 40 mg at 05/19/25 0607    polyethylene glycol (MIRALAX) packet 17 g, 17 g, Oral, Daily PRN, Skyler Hendrickson MD    QUEtiapine (SEROquel) tablet 12.5 mg, 12.5 mg, Oral, Daily, Crispin Arana MD, 12.5 mg at 05/18/25 1153    QUEtiapine (SEROquel) tablet 50 mg, 50 mg, Oral, HS, Crispin Arana MD, 50 mg at 05/18/25 2210    senna (SENOKOT) tablet 17.2 mg, 2 tablet, Oral, Daily With Lunch, Jessica Arreola DO, 17.2 mg at 05/18/25 1153     sitaGLIPtin (JANUVIA) tablet 50 mg, 50 mg, Oral, Daily, PATI Porras, 50 mg at 05/19/25 0831

## 2025-05-19 NOTE — ASSESSMENT & PLAN NOTE
Patient noted to have hypotension and tachycardia on 5/17  Sepsis workup ordered and found to have positive blood cultures again, growing E coli as before  ID reconsulted  Started Cefepime 5/17/25 = await blood urine cx results but preliminary results of urine >100,000 GNR and prelim blood cx = GNR with blood cx ID panel positive for E.coli  Continue IVF = renal managing  He has had stable BP, tachycardia has resolved and he has had no fevers  Await plan update from ID

## 2025-05-19 NOTE — PROGRESS NOTES
Progress Note - PMR   Name: Alexis Dennison 64 y.o. 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  > Started on high dose steroids and methotrexate every 2 weeks for 4 weeks (C1 received 4/18, C2 received 5/2) then maintenance every 4 weeks with Rituximab weekly for 8 weeks (1st dose Thursday 4/24 and second 5/3)  > Admitted to [facility] with ongoing cognitive deficits and delirium  > Completed Decadron taper on 5/19    - MTX + Rituximab ON HOLD as per Hem/onc  - Daily methotrexate levels when/if medication resumes  - Hem/Onc following  - NSGY following  - SLP for cognition  Type 2 diabetes mellitus with hyperglycemia, without long-term current use of insulin (HCC)  Lab Results   Component Value Date    HGBA1C 6.6 (H) 02/22/2025       Recent Labs     05/18/25  1026 05/18/25  1622 05/18/25  2032 05/19/25  0602   POCGLU 149* 159* 195* 126       Blood Sugar Average: Last 72 hrs:  (P) [id number]    - Metformin 1000mg daily ON HOLD  - Lantus 10u daily with Lispro 8u TID and SSI  - Monitor accuchecks while on steroids  - Management per IM  HTN, goal below 130/80  > Blood pressure controlled off antihypertensives    - Monitor VS  - Management per IM  Mixed hyperlipidemia  - Continue atorvastatin 10 mg daily  Thyroid nodule    Pulmonary nodule  > CTA 3/29: nonspecific 4 mm RLL pulmonary nodule   - follow up outpatient for repeat imaging  in 3 months  Liver lesion  > CTA 3/29- nonspecific 12mm hypodense right hepatic lesion, recommended MRI abdomen  > MRI abdomen 3/30- simple right hepatic lobe cyst    - Follow up outpatient for monitoring  Ambulatory dysfunction  - Fall precautions  - PT/OT  Encephalopathy  > Patient has been pleasantly confused for months in the setting of brain mass.  > acutely worsened due to UTI and bacteremia. S/p 7 day course of antibx  > Continues to lack insight to his current situation and is severely cognitively impaired.  He does not get agitated for long periods of time and is mostly confused and impulsive.  > A&O 1 on [facility] admission    - Continue seroquel 12.5mg at noon with 50mg HS  - PRN Zyprexa 2.5mg IM as needed for agitation  -Overstimulation precautions, frequent re-orientation, re-direction, re-assurance  -Sleep log and agitation monitoring if needed  -Optimize sleep-wake cycle  -Limit sedating medications when possible  - Ensure optimal management electrolytes, nutrition, and hydration  - Ensure optimal bowel/bladder management  - Ensure optimal pain management   -Patient/family/caregiver education and training   -Continue 1:1 due to overall safety, impulsivity, lack of safety awareness and poor insight.  -Fall precautions with frequent rounding; proactive toileting program, patient should not be unattended in bathroom      LETY (acute kidney injury) (HCC)  > Baseline Cr 0.9  > Recently 1.3 improved from before- peak 2.7  > Now s/p bicarb    - IVF per IM/Nephro  - Management at discretion of nephro   E. coli UTI  > Pet met sepsis criteria on 4/29 with confusion and agitation. UA was positive. Urine culture and blood cultures showed Ecoli sensitive to cephalosporins.  > ID consulted and pt completed 7d course of ceftriaxone  > Approved to restart chemo 5/2.  > Chemo on hold for GNR UTI and bacteremia    - Monitor while on antibiotics  Impaired mobility and activities of daily living  Patient was evaluated by the  rehabilitation team MD and an appropriate candidate for acute inpatient rehabilitation program at this time.  The patient will tolerate 3 hours/day 5 to 7 days/week of intensive physical, occupational in order to obtain goals for community discharge  Due to the patient's functional Compared to their baseline level of function in addition to their ongoing medical needs, the patient would benefit from daily supervision from a rehabilitation physician as well as rehabilitation nursing to implement and adjust the medical as well as functional plan of care in order to meet the patient's goals.  HTN (hypertension)  Had been on losartan but holding at this time was her blood pressures are good  Transaminitis  CMP checked on 5/9 and AST is down to 57 from 111 and ALT to 281 from 322  5/19 labs: AST 11 and ALT 25.  Hyponatremia  Sodium stable at 140 on 5/10 labs  Thrombocytopenia (HCC)  5/19- Platelets down to 79K- will need to continue monitoring with chemotherapy now on hold  Sepsis ([facility])  5/19- Sepsis alert over the weekend.  CT A/P showed perinephric stranding. UA abnormal and Ucx grew GNR in urine. Blood cultures also + for GNR. Lactate 4.4>5.1. procal 7.39> 6.55. Started on IV antibiotics and Chemo on hold per Onc  Recurrent gram negative bacteremia  See above  Leukopenia  5/19- WBC improving. Now 3.93 from 1.84 off chemo  Anemia  5/19 - hemoglobin of 8.0 - improved from 6.7 after transfusion of 1u pRBC over the weekend  At risk for acid-base imbalance  Urine pH testing and sodium bicarb drip with MTX treatments  Electrolyte imbalance risk    SIRS (systemic inflammatory response syndrome) (HCC)      Subjective   64-year-old male with history of type 2 diabetes, GERD, hyperlipidemia, hypertension, nonalcoholic fatty liver disease and sleep apnea who presents to [facility] on 3/29/2025 for increasing confusion. He had been recently diagnosed with a brain mass and family reported that he had been having increased symptoms  including forgetfulness over the past several months. Initial workup was negative however he was seen in the ER on 3/24 and a CT of the head was concerning for brain lesion. 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 Additional areas of enhancement in the basal ganglia on the left as well as the left cerebral peduncle/left quirino. There was an initial plan to have an outpatient neurosurgery evaluation but because of his worsening symptoms including visual deficits he came to the ER sooner. A CT of the chest abdomen pelvis was completed on 3/29 showing a right lower lobe pulmonary nodule and nonspecific hepatic lesion and MRI was completed revealing a simple right hepatic lobe cyst. A diagnostic lumbar puncture was completed on 3/31 suspicious for CNS lymphoma and a repeat CT completed on 4/3 showed worsening hydrocephalus and MRI on 4/11 showed disease progression. Biopsy was done and EVD placed on 4/14 and the patient self extubated postprocedure. The preliminary results from the biopsy were indicative of a small round blue cell tumor suggestive of non-Hodgkin's lymphoma. He was started on high-dose steroids, methotrexate and rituximab by oncology but did have a complicated course including LETY, intermittent agitated delirium requiring medications as well as continuous observation as well as an E. coli UTI with bacteremia status post course of antibiotics. The EVD was removed on 4/26. Patient was then transferred to [facility].    Chief Complaint: f/u non-traumatic brain injury and f/u ambulatory dysfunction    Interval: Patient seen and examined. No acute overnight events. Family training still on for later today. Discussed with Oncology who reports they talked to the patient's daughter several times about condition/plan moving forward-was told that daughter would relay the info to the rest of the family. MTX remains on hold  at this time per Onc due to bacteremia. Dispo planning for Thurs 5/22.Otherwise, pt feels well and denies any fevers, chills, chest pain, sob, abdominal pain, nausea/vomiting, headaches, lightheadedness or dizziness. Eating 100% of meals. Last BM 5/18. Labs reviewed today and notable for WBC 3.93, Hgb 8.0, Plt 79, albumin 2.8, total protein 5.0.    Objective :  Temp:  [97.3 °F (36.3 °C)-97.6 °F (36.4 °C)] 97.5 °F (36.4 °C)  HR:  [67-79] 67  BP: (112-123)/(58-67) 123/67  Resp:  [17-18] 17  SpO2:  [94 %-99 %] 94 %  O2 Device: None (Room air)    Functional Update:  Mobility: Sup bed mobility, CS with RW for ambulation (120') and CS/CGA with no AD for ambulation (250')  Transfers: Sup with and without RW  ADLs: Sup UBD/footwear, CGA LBD    Physical Exam  Vitals reviewed.   Constitutional:       Appearance: Normal appearance.   HENT:      Head: Normocephalic and atraumatic.      Right Ear: External ear normal.      Left Ear: External ear normal.      Nose: Nose normal.      Mouth/Throat:      Mouth: Mucous membranes are moist.      Pharynx: Oropharynx is clear.     Eyes:      Conjunctiva/sclera: Conjunctivae normal.       Cardiovascular:      Rate and Rhythm: Normal rate and regular rhythm.      Pulses: Normal pulses.      Heart sounds: Normal heart sounds.   Pulmonary:      Effort: Pulmonary effort is normal. No respiratory distress.      Breath sounds: Normal breath sounds. No wheezing.   Abdominal:      General: There is no distension.      Palpations: Abdomen is soft.     Skin:     General: Skin is warm and dry.     Neurological:      Mental Status: He is alert.     Psychiatric:         Mood and Affect: Mood normal.         Behavior: Behavior normal.         Scheduled Meds:  Current Facility-Administered Medications   Medication Dose Route Frequency Provider Last Rate    acetaminophen  650 mg Oral Q6H PRN Crispin Arana MD      allopurinol  300 mg Oral Daily Crispin Arana MD      alteplase  2 mg Intracatheter  Q1MIN PRN Crispin Arana MD      alteplase  2 mg Intracatheter Q1MIN PRN Magali Lee MD      aluminum-magnesium hydroxide-simethicone  30 mL Oral Q4H PRN Crispin Arana MD      atorvastatin  20 mg Oral Daily Crispin Arana MD      bisacodyl  10 mg Rectal Daily PRN Skyler Hendrickson MD      calcium carbonate  1,000 mg Oral Daily PRN Crispin Arana MD      cefepime  2,000 mg Intravenous Q12H Jesenia Buenrostro PA-C 2,000 mg (05/19/25 0145)    chlorhexidine  15 mL Mouth/Throat Q12H [facility] Crispin Arana MD      docusate sodium  100 mg Oral BID Jessica Arreola, DO      heparin (porcine)  5,000 Units Subcutaneous Q8H [facility] Crispin Arana MD      insulin lispro  1-5 Units Subcutaneous TID AC PATI Porras      insulin lispro  1-5 Units Subcutaneous HS PATI Porras      insulin lispro  6 Units Subcutaneous TID With Meals PATI Porras      lactulose  20 g Oral Daily PRN Joshua Kaminski, DO      melatonin  6 mg Oral HS Crispin Arana MD      [Held by provider] metFORMIN  1,000 mg Oral Daily Crispin Arana MD      multi-electrolyte  50 mL/hr Intravenous Continuous Johnyn Naik MD 50 mL/hr (05/19/25 0916)    OLANZapine  5 mg Intramuscular BID PRN Crispin Arana MD      ondansetron  4 mg Intravenous Q6H PRN Crispin Arana MD      oxyCODONE  2.5 mg Oral Q4H PRN Crispin Arana MD      Or    oxyCODONE  5 mg Oral Q4H PRN Crispin Arana MD      pantoprazole  40 mg Oral Early Morning Crispin Arana MD      polyethylene glycol  17 g Oral Daily PRN Skyler Hendrickson MD      QUEtiapine  12.5 mg Oral Daily Crispin Arana MD      QUEtiapine  50 mg Oral HS Crispin Arana MD      senna  2 tablet Oral Daily With Lunch Jesscia Arreola, DO      sitaGLIPtin  50 mg Oral Daily PATI Porras           Lab Results: I have reviewed the following results:  Results from last 7 days   Lab Units 05/19/25  0559 05/18/25  0856 05/18/25  0543   HEMOGLOBIN g/dL 8.0* 6.7* 6.3*   HEMATOCRIT % 23.7* 20.1*  18.9*   WBC Thousand/uL 3.93* 1.84* 1.91*   PLATELETS Thousands/uL 79* 66* 68*     Results from last 7 days   Lab Units 05/19/25  0559 05/18/25  0543 05/17/25  0537   BUN mg/dL 19 20 25   SODIUM mmol/L 140 140 136   POTASSIUM mmol/L 3.9 3.5 3.8   CHLORIDE mmol/L 107 105 92*   CREATININE mg/dL 1.20 1.28 1.38*   AST U/L 11* 13 17   ALT U/L 25 28 44

## 2025-05-19 NOTE — ASSESSMENT & PLAN NOTE
CMP checked on 5/9 and AST is down to 57 from 111 and ALT to 281 from 322  5/19 labs: AST 11 and ALT 25.

## 2025-05-19 NOTE — ASSESSMENT & PLAN NOTE
HR, leukopenia.  Due to bacteremia as below.  No other appreciable source.  LFTs, CT A/P otherwise unremarkable.  Improving with antibiotics.    Continue antibiotics as below  Follow temperatures closely  Recheck WBC in AM to monitor infection  Supportive care as per the primary service

## 2025-05-19 NOTE — ASSESSMENT & PLAN NOTE
Baseline creatinine 0.8 to 1.1.   Etiology suspected to be due to prerenal azotemia/possible early ATN  Peak SCr 2.77 on 4/22/25.   Renal function improved and now creatinine down to 1.2 mg/dL.

## 2025-05-20 LAB
ANION GAP SERPL CALCULATED.3IONS-SCNC: 6 MMOL/L (ref 4–13)
BACTERIA BLD CULT: ABNORMAL
BACTERIA BLD CULT: ABNORMAL
BUN SERPL-MCNC: 19 MG/DL (ref 5–25)
CALCIUM SERPL-MCNC: 8.3 MG/DL (ref 8.4–10.2)
CHLORIDE SERPL-SCNC: 106 MMOL/L (ref 96–108)
CO2 SERPL-SCNC: 29 MMOL/L (ref 21–32)
CREAT SERPL-MCNC: 1.2 MG/DL (ref 0.6–1.3)
E COLI DNA BLD POS QL NAA+NON-PROBE: DETECTED
GFR SERPL CREATININE-BSD FRML MDRD: 63 ML/MIN/1.73SQ M
GLUCOSE P FAST SERPL-MCNC: 95 MG/DL (ref 65–99)
GLUCOSE SERPL-MCNC: 105 MG/DL (ref 65–140)
GLUCOSE SERPL-MCNC: 88 MG/DL (ref 65–140)
GLUCOSE SERPL-MCNC: 93 MG/DL (ref 65–140)
GLUCOSE SERPL-MCNC: 95 MG/DL (ref 65–140)
GLUCOSE SERPL-MCNC: 95 MG/DL (ref 65–140)
GRAM STN SPEC: ABNORMAL
GRAM STN SPEC: ABNORMAL
POTASSIUM SERPL-SCNC: 3.5 MMOL/L (ref 3.5–5.3)
SODIUM SERPL-SCNC: 141 MMOL/L (ref 135–147)

## 2025-05-20 PROCEDURE — 97110 THERAPEUTIC EXERCISES: CPT

## 2025-05-20 PROCEDURE — 99232 SBSQ HOSP IP/OBS MODERATE 35: CPT | Performed by: NURSE PRACTITIONER

## 2025-05-20 PROCEDURE — 97129 THER IVNTJ 1ST 15 MIN: CPT

## 2025-05-20 PROCEDURE — 80048 BASIC METABOLIC PNL TOTAL CA: CPT | Performed by: INTERNAL MEDICINE

## 2025-05-20 PROCEDURE — 99232 SBSQ HOSP IP/OBS MODERATE 35: CPT | Performed by: STUDENT IN AN ORGANIZED HEALTH CARE EDUCATION/TRAINING PROGRAM

## 2025-05-20 PROCEDURE — 82948 REAGENT STRIP/BLOOD GLUCOSE: CPT

## 2025-05-20 PROCEDURE — 99232 SBSQ HOSP IP/OBS MODERATE 35: CPT | Performed by: INTERNAL MEDICINE

## 2025-05-20 PROCEDURE — 97112 NEUROMUSCULAR REEDUCATION: CPT

## 2025-05-20 PROCEDURE — 97530 THERAPEUTIC ACTIVITIES: CPT

## 2025-05-20 PROCEDURE — G0545 PR INHERENT VISIT TO INPT: HCPCS | Performed by: INTERNAL MEDICINE

## 2025-05-20 PROCEDURE — 97130 THER IVNTJ EA ADDL 15 MIN: CPT

## 2025-05-20 RX ORDER — CEFADROXIL 500 MG/1
1000 CAPSULE ORAL EVERY 12 HOURS SCHEDULED
Status: DISCONTINUED | OUTPATIENT
Start: 2025-05-20 | End: 2025-05-28 | Stop reason: HOSPADM

## 2025-05-20 RX ORDER — SENNOSIDES 8.6 MG
17.2 TABLET ORAL
Start: 2025-05-21

## 2025-05-20 RX ORDER — DOCUSATE SODIUM 100 MG/1
100 CAPSULE, LIQUID FILLED ORAL 2 TIMES DAILY
Start: 2025-05-20

## 2025-05-20 RX ADMIN — HEPARIN SODIUM 5000 UNITS: 5000 INJECTION INTRAVENOUS; SUBCUTANEOUS at 21:17

## 2025-05-20 RX ADMIN — DOCUSATE SODIUM 100 MG: 100 CAPSULE, LIQUID FILLED ORAL at 16:54

## 2025-05-20 RX ADMIN — QUETIAPINE 50 MG: 25 TABLET ORAL at 21:17

## 2025-05-20 RX ADMIN — ATORVASTATIN CALCIUM 20 MG: 20 TABLET, FILM COATED ORAL at 07:59

## 2025-05-20 RX ADMIN — INSULIN LISPRO 6 UNITS: 100 INJECTION, SOLUTION INTRAVENOUS; SUBCUTANEOUS at 11:43

## 2025-05-20 RX ADMIN — CEFADROXIL 1000 MG: 500 CAPSULE ORAL at 21:19

## 2025-05-20 RX ADMIN — CHLORHEXIDINE GLUCONATE 15 ML: 1.2 SOLUTION ORAL at 07:59

## 2025-05-20 RX ADMIN — SITAGLIPTIN 50 MG: 50 TABLET, FILM COATED ORAL at 07:59

## 2025-05-20 RX ADMIN — QUETIAPINE 12.5 MG: 25 TABLET ORAL at 11:42

## 2025-05-20 RX ADMIN — Medication 6 MG: at 21:17

## 2025-05-20 RX ADMIN — INSULIN LISPRO 6 UNITS: 100 INJECTION, SOLUTION INTRAVENOUS; SUBCUTANEOUS at 08:00

## 2025-05-20 RX ADMIN — PANTOPRAZOLE SODIUM 40 MG: 40 TABLET, DELAYED RELEASE ORAL at 05:46

## 2025-05-20 RX ADMIN — HEPARIN SODIUM 5000 UNITS: 5000 INJECTION INTRAVENOUS; SUBCUTANEOUS at 05:46

## 2025-05-20 RX ADMIN — ALLOPURINOL 300 MG: 300 TABLET ORAL at 07:59

## 2025-05-20 RX ADMIN — DOCUSATE SODIUM 100 MG: 100 CAPSULE, LIQUID FILLED ORAL at 08:00

## 2025-05-20 RX ADMIN — SODIUM CHLORIDE, SODIUM GLUCONATE, SODIUM ACETATE, POTASSIUM CHLORIDE, MAGNESIUM CHLORIDE, SODIUM PHOSPHATE, DIBASIC, AND POTASSIUM PHOSPHATE 50 ML/HR: .53; .5; .37; .037; .03; .012; .00082 INJECTION, SOLUTION INTRAVENOUS at 09:50

## 2025-05-20 RX ADMIN — SENNOSIDES 17.2 MG: 8.6 TABLET, FILM COATED ORAL at 11:42

## 2025-05-20 RX ADMIN — HEPARIN SODIUM 5000 UNITS: 5000 INJECTION INTRAVENOUS; SUBCUTANEOUS at 14:52

## 2025-05-20 RX ADMIN — CEFADROXIL 1000 MG: 500 CAPSULE ORAL at 14:52

## 2025-05-20 RX ADMIN — INSULIN LISPRO 6 UNITS: 100 INJECTION, SOLUTION INTRAVENOUS; SUBCUTANEOUS at 16:53

## 2025-05-20 NOTE — ASSESSMENT & PLAN NOTE
Baseline creatinine 0.8 to 1.1.   Etiology suspected to be due to prerenal azotemia/possible early ATN  Peak SCr 2.77 on 4/22/25.   Renal function improved to creatinine 1.2 mg/dL and stable at this range for last 2 days, continue to monitor.  Currently on IV normal saline at 50 mL/h continue at current rate.  Clinically appears euvolemic, recommend oral hydration.  Would consider switching to bicarb drip when  plan to start on methotrexate again

## 2025-05-20 NOTE — ASSESSMENT & PLAN NOTE
TSH/free T4 4/21/25 = 0.019/1.13  Nonemergent outpatient endocrine follow-up.   Will require outpatient TSH, free T4, +/- further imaging with endocrine such as radioactive uptake  Repeat TSH/free T4 done 5/12 = TSH was 0.287 with free T4 0.85  Endo to see and advise

## 2025-05-20 NOTE — PROGRESS NOTES
"OT daily treatment note       05/20/25 1200   Pain Assessment   Pain Assessment Tool 0-10   Pain Score No Pain   Restrictions/Precautions   Precautions 1:1;Bed/chair alarms;Cognitive;Fall Risk;Multiple lines;Supervision on toilet/commode;Limb alert   Lifestyle   Autonomy \"I was an \"   Sit to Lying   Type of Assistance Needed Supervision   Physical Assistance Level No physical assistance   Sit to Lying CARE Score 4   Lying to Sitting on Side of Bed   Type of Assistance Needed Supervision   Physical Assistance Level No physical assistance   Lying to Sitting on Side of Bed CARE Score 4   Sit to Stand   Type of Assistance Needed Supervision   Physical Assistance Level No physical assistance   Comment no AD   Sit to Stand CARE Score 4   Bed-Chair Transfer   Type of Assistance Needed Incidental touching   Physical Assistance Level No physical assistance   Comment CG/ CS no AD   Chair/Bed-to-Chair Transfer CARE Score 4   Toileting Hygiene   Type of Assistance Needed Supervision   Physical Assistance Level No physical assistance   Comment in stance at toilet to urinate   Toileting Hygiene CARE Score 4   Toilet Transfer   Type of Assistance Needed Supervision   Physical Assistance Level No physical assistance   Comment in stance at toilet to urinate   Toilet Transfer CARE Score 4   Commmunity Re-entry   Community Re-entry Level   (no AD)   Community Re-entry Level of Assistance Close supervision;Contact guard   Community Re-entry Pt participated in functional mobility task with use of no AD (IV mgmt assist only) to simulate home and community distances required for ADL/IADL completion. Task focused on increasing functional activity tolerance, endurance, strength and dynamic standing balance to improve independence with ADL completion.   Functional Standing Tolerance   Activity word search   Comments Pt engaged in word search activity in stance focusing on increasing dynamic standing balance with bilateral " release to increase IND with ADL tasks. Activity also focused on functional standing tolerance, endurance, activity tolerance, visual scanning and working memory. Pt able to stand for 15 minutes in total requiring a rest break after Q4-5 mins minutes. Education provided on energy conservation techniques including pursed lip and diaphragmatic breathing. Pt would benefit from cont activities focusing on improving the above mentioned deficits.   Exercise Tools   UE Ergometer Pt engaged in UE SciFit for 6 minutes in prograde and 4 minutes in retrograde with Minimal resistance (level 1.0) focusing on increasing pts UB strength, endurance and functional activity tolerance to increase independence with ADL tasks. Short rest break required in between sets for fatigue mgmt. Education provided on energy conservation and breathing techniques with Positive carryover of provided education.   Cognition   Overall Cognitive Status Impaired   Arousal/Participation Alert;Cooperative   Attention Attends with cues to redirect   Orientation Level Oriented to person;Disoriented to time;Disoriented to situation;Disoriented to place   Memory Decreased recall of recent events;Decreased short term memory;Decreased recall of precautions;Decreased long term memory;Decreased recall of biographical information   Following Commands Follows one step commands with increased time or repetition   Comments pt engaged in a variety of functional cognitive tasks focusing on attention, memory, abstraction, problem solving and reasoning. pt initially participated in cause and effect cards getting 4/8 correct w/o cueing. pt then engaged in pipe tree activity x2 and was able to complete with very mininal cueing. pt appeared to appreciate this task and reported he was an . Lastly, pt participated in scrabble game which he required more of a mod-max vc but that was likely due to the pt never playing this activity before but over time pt appeared to  understand the concept and was able to complete w/ less cueing. pt would benefit from cont functoinal cog activities in prep for DC home.   Activity Tolerance   Activity Tolerance Patient tolerated treatment well  (fatigued by end of session)   Assessment   Treatment Assessment Pt participated in skilled OT session with focus on ADL retraining, functional transfer training, UE strengthening/ROM, endurance training, cognitive reorientation, compensatory technique education, and energy conservation. See flowsheet for details of session and current functional status. Pt is limited by impaired balance, decreased endurance, increased fall risk, decreased ADLS, decreased IADLS, decreased activity tolerance, decreased safety awareness, impaired judgement, decreased cognition, and decreased strength and requires skilled OT services to increase independence and safety with ADL completion in prep for DC home. Plan to continue ADL retraining, functional transfer training, UE strengthening/ROM, endurance training, cognitive reorientation, equipment evaluation/education, compensatory technique education, continued education, energy conservation, and activity engagement  to address barriers mentioned above.   Prognosis Fair   Problem List Decreased strength;Decreased endurance;Impaired balance;Decreased mobility;Decreased coordination;Decreased cognition;Impaired judgement;Decreased safety awareness   Barriers to Discharge Inaccessible home environment   Plan   Treatment/Interventions ADL retraining;Functional transfer training;Therapeutic exercise;Endurance training;Patient/family training;Equipment eval/education;Compensatory technique education;Cognitive reorientation   Progress Progressing toward goals   OT Therapy Minutes   OT Time In 1200   OT Time Out 1330   OT Total Time (minutes) 90   OT Mode of treatment - Individual (minutes) 90   OT Mode of treatment - Concurrent (minutes) 0   OT Mode of treatment - Group (minutes) 0    OT Mode of treatment - Co-treat (minutes) 0   OT Mode of Treatment - Total time(minutes) 90 minutes   OT Cumulative Minutes 865   Therapy Time missed   Time missed? No

## 2025-05-20 NOTE — ASSESSMENT & PLAN NOTE
Hemoglobin A1C was 6.6 on 2/22/25  Sees Endo [facility] in [place]  Home:  Janumet XR  qd  Here: Lispro 6 U TID/Januvia 50mg qd  Continue DM diet  On QID Accuchecks with SSI Algo 1  BSs have continued to improve with steroid being tapered  = LD was 5/19 5/17 creat bumped up to 1.38 so Metformin was placed on hold  Creatinine is 1.20 again today 5/20.  If he stays stable, will restart Metformin and stop the Lispro WM.  He did have contrast on 5/18/25

## 2025-05-20 NOTE — ASSESSMENT & PLAN NOTE
5/19- Sepsis alert over the weekend.  CT A/P showed perinephric stranding. UA abnormal and Ucx grew GNR in urine. Blood cultures also + for GNR. Lactate 4.4>5.1. procal 7.39> 6.55. Started on IV antibiotics and Chemo on hold per Onc  - Continue antibiotics  - Monitor WBC on routine labs  - Follow temp curve

## 2025-05-20 NOTE — PROGRESS NOTES
Progress Note - Infectious Disease   Name: Alexis Dennison 64 y.o. male I MRN: [id number]  Unit/Bed#: -01 I Date of Admission: 5/7/2025   Date of Service: 5/20/2025 I Hospital Day: 13    Assessment & Plan  Sepsis ([facility])  HR, leukopenia.  Due to bacteremia as below.  No other appreciable source.  LFTs, CT A/P otherwise unremarkable.  Improving with antibiotics.    Continue antibiotics as below  Follow temperatures closely  Supportive care as per the primary service  Recurrent E. coli bacteremia  Recent positive blood culture on 4/29 due to UTI versus PICC, status post 7 days of IV antibiotic thru 5/5.  Now with recurrence due to UTI given positive urine culture, increased perinephric stranding seen on CT.  Consider PICC infection, less likely.    Change antibiotics to Duricef to continue through 5/30 for 14 days total antibiotics  OK to keep PICC  Leukopenia  Likely due to recent chemo as well as sepsis, bacteremia.  Status post G-CSF 5/17, 5/18.  Improving.    Antibiotic plan as above  Continue to monitor CBC  Primary central nervous system (CNS) lymphoma  Diagnosed by LP, brain biopsy.  Complicated by obstructive hydrocephalus, status post EVD 4/13 through 4/19.  Status post Rituxan, high-dose methotrexate and intrathecal cytarabine on 4/17.  Received repeat dose of Rituxan on 5/10.  Type 2 diabetes mellitus with hyperglycemia, without long-term current use of insulin ([facility])  Lab Results   Component Value Date    HGBA1C 6.6 (H) 02/22/2025   Relatively well-controlled, though remains a risk factor for infection.      The patient is stable from an ID standpoint    Antibiotics:  Ceftriaxone  Antibiotics #4    Subjective   Patient has no fever, chills, sweats; no nausea, vomiting, diarrhea; no cough, shortness of breath; no pain. No new symptoms.    Objective :  Temp:  [97.6 °F (36.4 °C)-97.8 °F (36.6 °C)] 97.8 °F (36.6 °C)  HR:  [62-74] 62  BP: (127-139)/(69-80) 139/80  Resp:  [16-18] 16  SpO2:  [95 %-99 %] 95 %  O2  Device: None (Room air)    General:  No acute distress  Psychiatric:  Awake and alert  Pulmonary:  Normal respiratory excursion without accessory muscle use  Abdomen:  Soft, nontender  Extremities:  No edema  Skin:  No rashes      Lab Results: I have reviewed the following results:  Results from last 7 days   Lab Units 05/19/25  0559 05/18/25  0856 05/18/25  0543   WBC Thousand/uL 3.93* 1.84* 1.91*   HEMOGLOBIN g/dL 8.0* 6.7* 6.3*   PLATELETS Thousands/uL 79* 66* 68*     Results from last 7 days   Lab Units 05/20/25  0541 05/19/25  0559 05/18/25  0543 05/17/25  0537   SODIUM mmol/L 141 140 140 136   POTASSIUM mmol/L 3.5 3.9 3.5 3.8   CHLORIDE mmol/L 106 107 105 92*   CO2 mmol/L 29 27 30 31   BUN mg/dL 19 19 20 25   CREATININE mg/dL 1.20 1.20 1.28 1.38*   EGFR ml/min/1.73sq m 63 63 58 53   CALCIUM mg/dL 8.3* 7.9* 7.4* 8.3*   AST U/L  --  11* 13 17   ALT U/L  --  25 28 44   ALK PHOS U/L  --  55 67 66   ALBUMIN g/dL  --  2.8* 2.6* 3.4*     Results from last 7 days   Lab Units 05/17/25  1410 05/17/25  1217   BLOOD CULTURE   --  Escherichia coli*  Escherichia coli*   GRAM STAIN RESULT   --  Gram negative rods*  Gram negative rods*   URINE CULTURE  >100,000 cfu/ml Escherichia coli*  --      Results from last 7 days   Lab Units 05/18/25  0543 05/17/25  1217   PROCALCITONIN ng/ml 6.55* 7.39*

## 2025-05-20 NOTE — ASSESSMENT & PLAN NOTE
Patient noted to have hypotension and tachycardia on 5/17  Sepsis workup ordered and found to have positive blood cultures again, growing E coli as before  ID reconsulted  Started Cefepime 5/17/25 = await blood urine cx results but preliminary results of urine >100,000 GNR and prelim blood cx = GNR with blood cx ID panel positive for E.coli  Continue IVF = renal managing  He has had stable BP, tachycardia has resolved and he has had no fevers  Updated plan from ID as below

## 2025-05-20 NOTE — ASSESSMENT & PLAN NOTE
HR, leukopenia.  Due to bacteremia as below.  No other appreciable source.  LFTs, CT A/P otherwise unremarkable.  Improving with antibiotics.    Continue antibiotics as below  Follow temperatures closely  Supportive care as per the primary service

## 2025-05-20 NOTE — ASSESSMENT & PLAN NOTE
Monitor status post methotrexate  Peak creat was 2.77 on 4/22/25  Previous baseline as OP 5/2023-2/22/25 was 1.1 to 1.0  CMP 5/9/25 with creatinine of 1.18 down from 1.23 on 5/8 and on 5/10 was 1.15  CMP on 5/12/25 showed creatinine stable at 1.1  5/17: creat up again to 1.38 = Being followed by Nephrology. Met sepsis criteria 5/17 and placed on IVF ordered as per sepsis protocol  5/19: creat trending down and is 1.20 -->  IVF was  reduced to 50ml/hr per renal.  Today 5/20/25, they want to continue same IVF

## 2025-05-20 NOTE — PROGRESS NOTES
Progress Note - Nephrology   Name: Alexis Dennison 64 y.o. male I MRN: [id number]  Unit/Bed#: -01 I Date of Admission: 5/7/2025   Date of Service: 5/20/2025 I Hospital Day: 13    Assessment & Plan  LETY (acute kidney injury) (HCC)  Baseline creatinine 0.8 to 1.1.   Etiology suspected to be due to prerenal azotemia/possible early ATN  Peak SCr 2.77 on 4/22/25.   Renal function improved to creatinine 1.2 mg/dL and stable at this range for last 2 days, continue to monitor.  Currently on IV normal saline at 50 mL/h continue at current rate.  Clinically appears euvolemic, recommend oral hydration.  Would consider switching to bicarb drip when  plan to start on methotrexate again    HTN (hypertension)  BP stable and is at goal  Not on BP meds.  Avoid hypotension    Primary central nervous system (CNS) lymphoma  Hematology is following.     Complicated by obstructive hydrocephalus, status post EVD on 4/13 through 4/19.    Status post rituximab, status post high-dose methotrexate and intrathecal cytarabine on 4/17.  Repeat dose of rituximab on 5/10  Needs high dose methotrexate -currently on hold given suspected sepsis.  Blood cultures positive for gram-negative rods and urine culture also positive for gram-negative rods.  Infectious disease following.  Currently on cefepime.  Continue management per infectious disease.  Will need urinary alkalinization with methotrexate administration.   Previously on sodium bicarb infusion, currently on IV Isolyte.  Goal urine pH is 7.0 or above.   Continue to monitor volume status, if plan to start on methotrexate, will consider switching to bicarb drip.  For now continue IV Isolyte at current rate  Sepsis (HCC)  Blood culture urine culture positive for gram-negative rods-E. coli.  Urine culture was positive for E. coli was found to be tachycardic and hypotensive and had leukopenia.  Continue IV antibiotic per infectious disease  Anemia  Status post PRBC, hemoglobin today 8.0 g/dL  continue management per primary team    I have reviewed the nephrology recommendations including renal function improving and stable and plan to continue current IV fluid with Isolyte, with primary team, and we are in agreement with renal plan including the information outlined above.     Subjective   No new complaints.  No chest pain or shortness of breath, no nausea vomiting    Objective :  Temp:  [97.6 °F (36.4 °C)-97.8 °F (36.6 °C)] 97.8 °F (36.6 °C)  HR:  [62-74] 62  BP: (127-139)/(69-80) 139/80  Resp:  [16-18] 16  SpO2:  [95 %-99 %] 95 %  O2 Device: None (Room air)    Current Weight: Weight - Scale: 80 kg (176 lb 5.9 oz)  First Weight: Weight - Scale: 75.9 kg (167 lb 6.4 oz)  I/O         05/18 0701  05/19 0700 05/19 0701  05/20 0700 05/20 0701  05/21 0700    P.O. 417 540 240    I.V. (mL/kg)  630 (7.9)     Blood 385.7      IV Piggyback  50     Total Intake(mL/kg) 802.7 (10) 1220 (15.3) 240 (3)    Urine (mL/kg/hr)  900 (0.5)     Total Output  900     Net +802.7 +320 +240           Unmeasured Urine Occurrence 1 x 2 x     Unmeasured Stool Occurrence 1 x            Physical Exam   General:  Ill looking, awake.  Eyes: Conjunctivae pink,  Sclera anicteric  ENT: lips and mucous membranes moist  Neck: supple   Chest: Clear to Auscultation both lungs,  no crackles, ronchus or wheezing.  CVS: S1 & S2 present, normal rate, regular rhythm, no murmur.  Abdomen: soft, non-tender, non-distended, Bowel sounds normoactive  Extremities: no edema of  legs  Skin: no rash  Neuro: awake, alert, oriented x 3   Psych: Mood and affect appropriate    Medications:  Current Medications[1]      Lab Results: I have reviewed the following results:  Results from last 7 days   Lab Units 05/20/25  0541 05/19/25  0559 05/18/25  0856 05/18/25  0543 05/17/25  0941 05/17/25  0537 05/16/25  0618 05/15/25  0600   WBC Thousand/uL  --  3.93* 1.84* 1.91* 1.26*  --   --  3.35*   HEMOGLOBIN g/dL  --  8.0* 6.7* 6.3* 8.5*  --   --  9.0*   HEMATOCRIT %  --   "23.7* 20.1* 18.9* 24.1*  --   --  26.1*   PLATELETS Thousands/uL  --  79* 66* 68* 85*  --   --  115*   POTASSIUM mmol/L 3.5 3.9  --  3.5  --  3.8 4.2 3.9   CHLORIDE mmol/L 106 107  --  105  --  92* 98 98   CO2 mmol/L 29 27  --  30  --  31 29 29   BUN mg/dL 19 19  --  20  --  25 27* 27*   CREATININE mg/dL 1.20 1.20  --  1.28  --  1.38* 1.19 1.25   CALCIUM mg/dL 8.3* 7.9*  --  7.4*  --  8.3* 8.8 8.5   ALBUMIN g/dL  --  2.8*  --  2.6*  --  3.4*  --   --        Administrative Statements     Portions of the record may have been created with voice recognition software. Occasional wrong word or \"sound a like\" substitutions may have occurred due to the inherent limitations of voice recognition software. Read the chart carefully and recognize, using context, where substitutions have occurred.If you have any questions, please contact the dictating provider.       [1]   Current Facility-Administered Medications:     acetaminophen (TYLENOL) tablet 650 mg, 650 mg, Oral, Q6H PRN, Crispin Arana MD, 650 mg at 05/16/25 0844    allopurinol (ZYLOPRIM) tablet 300 mg, 300 mg, Oral, Daily, Crispin Arana MD, 300 mg at 05/20/25 0759    alteplase (CATHFLO) injection 2 mg, 2 mg, Intracatheter, Q1MIN PRN, Crispin Arana MD    alteplase (CATHFLO) injection 2 mg, 2 mg, Intracatheter, Q1MIN PRN, Magali Lee MD    aluminum-magnesium hydroxide-simethicone (MAALOX) oral suspension 30 mL, 30 mL, Oral, Q4H PRN, Crispin Arana MD    atorvastatin (LIPITOR) tablet 20 mg, 20 mg, Oral, Daily, Crispin Arana MD, 20 mg at 05/20/25 0759    bisacodyl (DULCOLAX) rectal suppository 10 mg, 10 mg, Rectal, Daily PRN, Skyler Hendrickson MD    calcium carbonate (TUMS) chewable tablet 1,000 mg, 1,000 mg, Oral, Daily PRN, Crispin Arana MD    cefTRIAXone (ROCEPHIN) 2,000 mg in dextrose 5 % 50 mL IVPB, 2,000 mg, Intravenous, Q24H, Lisa Singh MD, Last Rate: 100 mL/hr at 05/19/25 2034, 2,000 mg at 05/19/25 2034    chlorhexidine (PERIDEX) 0.12 % oral rinse 15 mL, " 15 mL, Mouth/Throat, Q12H [facility], Crispin Arana MD, 15 mL at 05/20/25 0759    docusate sodium (COLACE) capsule 100 mg, 100 mg, Oral, BID, Jessica Arreola DO, 100 mg at 05/20/25 0800    heparin (porcine) subcutaneous injection 5,000 Units, 5,000 Units, Subcutaneous, Q8H [facility], Crispin Arana MD, 5,000 Units at 05/20/25 0546    insulin lispro (HumALOG/ADMELOG) 100 units/mL subcutaneous injection 1-5 Units, 1-5 Units, Subcutaneous, TID AC, 1 Units at 05/19/25 1609 **AND** Fingerstick Glucose (POCT), , , TID AC, PATI Porras    insulin lispro (HumALOG/ADMELOG) 100 units/mL subcutaneous injection 1-5 Units, 1-5 Units, Subcutaneous, HS, PATI Porras, 1 Units at 05/18/25 2213    insulin lispro (HumALOG/ADMELOG) 100 units/mL subcutaneous injection 6 Units, 6 Units, Subcutaneous, TID With Meals, PATI Porras, 6 Units at 05/20/25 0800    lactulose (CHRONULAC) oral solution 20 g, 20 g, Oral, Daily PRN, Joshua Kaminski DO, 20 g at 05/14/25 1805    melatonin tablet 6 mg, 6 mg, Oral, HS, Crispin Arana MD, 6 mg at 05/19/25 2021    [Held by provider] metFORMIN (GLUCOPHAGE-XR) 24 hr tablet 1,000 mg, 1,000 mg, Oral, Daily, Crispin Arana MD, 1,000 mg at 05/17/25 0829    multi-electrolyte (Plasmalyte-A/Isolyte-S PH 7.4/Normosol-R) IV solution, 50 mL/hr, Intravenous, Continuous, Johnny Naik MD, Last Rate: 50 mL/hr at 05/19/25 1333, 50 mL/hr at 05/19/25 1333    OLANZapine (ZyPREXA) IM injection 5 mg, 5 mg, Intramuscular, BID PRN, Crispin Arana MD    ondansetron (ZOFRAN) injection 4 mg, 4 mg, Intravenous, Q6H PRN, Crispin Arana MD    oxyCODONE (ROXICODONE) split tablet 2.5 mg, 2.5 mg, Oral, Q4H PRN, 2.5 mg at 05/16/25 0844 **OR** oxyCODONE (ROXICODONE) IR tablet 5 mg, 5 mg, Oral, Q4H PRN, Crispin Arana MD, 5 mg at 05/18/25 0221    pantoprazole (PROTONIX) EC tablet 40 mg, 40 mg, Oral, Early Morning, Crispin Arana MD, 40 mg at 05/20/25 0546    polyethylene glycol (MIRALAX) packet 17 g,  17 g, Oral, Daily PRN, Skyler Hendrickson MD    QUEtiapine (SEROquel) tablet 12.5 mg, 12.5 mg, Oral, Daily, Crispin Arana MD, 12.5 mg at 05/19/25 1218    QUEtiapine (SEROquel) tablet 50 mg, 50 mg, Oral, HS, Crispin Arana MD, 50 mg at 05/19/25 2101    senna (SENOKOT) tablet 17.2 mg, 2 tablet, Oral, Daily With Lunch, Jessica Arreola DO, 17.2 mg at 05/19/25 1218    sitaGLIPtin (JANUVIA) tablet 50 mg, 50 mg, Oral, Daily, PATI Porras, 50 mg at 05/20/25 0751

## 2025-05-20 NOTE — ASSESSMENT & PLAN NOTE
Hgb dropped to 6.3 on 5/18 and was confirmed by repeat lab  at 6.7 so was transfused with one unit of PRBCs after discussion with Oncology  Spoke with wife over the phone and consent obtained  On 5/19/25, hemoglobin was up to 8.0 = appropriate response to transfusion

## 2025-05-20 NOTE — PROGRESS NOTES
"   05/20/25 1400   Pain Assessment   Pain Score No Pain   Restrictions/Precautions   Precautions 1:1;Bed/chair alarms;Cognitive;Limb alert;Supervision on toilet/commode;Fall Risk   Cognition   Arousal/Participation Alert;Cooperative   Attention Attends with cues to redirect   Memory Decreased recall of recent events;Decreased short term memory;Decreased recall of precautions;Decreased long term memory;Decreased recall of biographical information   Following Commands Follows one step commands with increased time or repetition   Subjective   Subjective \"I feel tired\" at end of session   Sit to Lying   Type of Assistance Needed Supervision   Sit to Lying CARE Score 4   Lying to Sitting on Side of Bed   Type of Assistance Needed Supervision   Lying to Sitting on Side of Bed CARE Score 4   Sit to Stand   Type of Assistance Needed Supervision   Comment no AD or with RW   Sit to Stand CARE Score 4   Transfer Bed/Chair/Wheelchair   Adaptive Equipment Roller Walker;None   Walk 10 Feet   Type of Assistance Needed Supervision   Comment CS with RW or without AD   Walk 10 Feet CARE Score 4   Walk 50 Feet with Two Turns   Type of Assistance Needed Supervision   Comment CS with RW, CGA with no AD   Walk 50 Feet with Two Turns CARE Score 4   Walk 150 Feet   Type of Assistance Needed Supervision;Adaptive equipment   Comment CS with RW   Walk 150 Feet CARE Score 4   Ambulation   Primary Mode of Locomotion Prior to Admission Walk   Distance Walked (feet) 150 ft  (with RW, 150 feet with no AD)   Walk Assist Level Close Supervision;Contact Guard   Does the patient walk? 2. Yes   Toilet Transfer   Findings stood to urinate at end of session   Therapeutic Interventions   Neuromuscular Re-Education Ambulatory badminton about 10 mins total with no seated rest break   Equipment Use   NuStep LE only L3 X 10 mins   Assessment   Treatment Assessment Pt. participated in short 30 mins session and was able to tolerate nu step and dynamic " "ambulation/ balance activity through playing badminton (using balloon as a ball). Pt. demonstrates good righting reactions and able to change his footing without LOB when trying to reach for the balloon either to his R or L. Pt. also able to  the balloon directly from the floor with no issues reported. He was fatigued at the end of session but otherwise felt \"okay\" just tired. pt. recommended to get back to bed at this time to take a nap with lights dim and closed door with 1:1 present.   Problem List Decreased strength;Decreased endurance;Impaired balance;Decreased mobility;Decreased coordination;Decreased cognition;Impaired judgement;Decreased safety awareness   Barriers to Discharge Inaccessible home environment   PT Barriers   Functional Limitation Car transfers;Stair negotiation;Standing;Transfers;Walking   Plan   Treatment/Interventions Functional transfer training;LE strengthening/ROM;Elevations;Therapeutic exercise;Endurance training;Patient/family training;Equipment eval/education;Bed mobility;Gait training   Discharge Recommendation   Rehab Resource Intensity Level, PT   (24/7 S)   PT Therapy Minutes   PT Time In 1400   PT Time Out 1430   PT Total Time (minutes) 30   PT Mode of treatment - Individual (minutes) 30   PT Mode of treatment - Concurrent (minutes) 0   PT Mode of treatment - Group (minutes) 0   PT Mode of treatment - Co-treat (minutes) 0   PT Mode of Treatment - Total time(minutes) 30 minutes   PT Cumulative Minutes 796   Therapy Time missed   Time missed? No       "

## 2025-05-20 NOTE — PROGRESS NOTES
"   05/20/25 1000   Pain Assessment   Pain Score No Pain   Restrictions/Precautions   Precautions 1:1;Bed/chair alarms;Fall Risk;Cognitive;Limb alert;Supervision on toilet/commode   Cognition   Arousal/Participation Alert;Cooperative   Attention Attends with cues to redirect   Memory Decreased recall of recent events;Decreased short term memory;Decreased recall of precautions;Decreased long term memory;Decreased recall of biographical information   Following Commands Follows one step commands with increased time or repetition   Subjective   Subjective \"I am okay\"   Sit to Stand   Type of Assistance Needed Supervision   Comment no AD   Sit to Stand CARE Score 4   Walk 10 Feet   Type of Assistance Needed Supervision   Comment CS with no AD using gait belt   Walk 10 Feet CARE Score 4   Walk 50 Feet with Two Turns   Type of Assistance Needed Incidental touching;Supervision   Comment CS/CGA with no AD   Walk 50 Feet with Two Turns CARE Score 4   Walk 150 Feet   Type of Assistance Needed Supervision;Incidental touching   Comment CGA with no AD   Walk 150 Feet CARE Score 4   Ambulation   Primary Mode of Locomotion Prior to Admission Walk   Distance Walked (feet) 350 ft  (250, 150 X 2)   Assist Device   (no AD with gait belt)   Gait Pattern Inconsistant Vicki;Decreased foot clearance;Improper weight shift   Walk Assist Level Close Supervision;Contact Guard   Does the patient walk? 2. Yes   Wheel 50 Feet with Two Turns   Reason if not Attempted Activity not applicable   Wheel 50 Feet with Two Turns CARE Score 9   Wheel 150 Feet   Reason if not Attempted Activity not applicable   Wheel 150 Feet CARE Score 9   Wheelchair mobility   Does the patient use a wheelchair? 0. No   Curb or Single Stair   Style negotiated Single stair   Type of Assistance Needed Supervision   Comment CS using B HR up and then R HR down   1 Step (Curb) CARE Score 4   4 Steps   Type of Assistance Needed Incidental touching   Comment CGA using B HR up " and then R HR down   4 Steps CARE Score 4   12 Steps   Type of Assistance Needed Incidental touching   Comment CGA using B HR up and then R HR down   12 Steps CARE Score 4   Stairs   Type Stairs   # of Steps 12   Weight Bearing Precautions Fall Risk   Assist Devices Bilateral Rail;Single Rail   Findings up forward coming down semi sideways due to IV   Toilet Transfer   Type of Assistance Needed Incidental touching   Toilet Transfer CARE Score 4   Toilet Transfer   Surface Assessed Standard Toilet   Findings had an incontinent episode on brief and had to be changed. Did not need to go more in the toilet   Therapeutic Interventions   Flexibility B hamstring and gastroc stretching   Neuromuscular Re-Education HIGT walking with inc speeds, also walked in different speeds with head turns and sudden stops.   Assessment   Treatment Assessment Pt. engaged in short 30 mins session with focused again on NMR/ challenging balance with mobility with no AD. Pt. cont to demonstrate steady gait pattern but still occasionally likes to bessy and grab for objects for support. Pt. also noted to have change in speeds with head turns but does not cause a significant path deviation during ambulation. Cont to recommend to use RW mostly at home with family. No major LOB noted during session. COnt with PT POC in the PM.   Problem List Decreased strength;Decreased endurance;Impaired balance;Decreased mobility;Decreased coordination;Decreased cognition;Impaired judgement;Decreased safety awareness   Barriers to Discharge Inaccessible home environment   PT Barriers   Functional Limitation Car transfers;Stair negotiation;Standing;Transfers;Walking   Plan   Treatment/Interventions Functional transfer training;LE strengthening/ROM;Elevations;Therapeutic exercise;Endurance training;Patient/family training;Equipment eval/education;Bed mobility;Gait training   Discharge Recommendation   Rehab Resource Intensity Level, PT   (24/7 S)   PT Therapy  Minutes   PT Time In 1000   PT Time Out 1030   PT Total Time (minutes) 30   PT Mode of treatment - Individual (minutes) 30   PT Mode of treatment - Concurrent (minutes) 0   PT Mode of treatment - Group (minutes) 0   PT Mode of treatment - Co-treat (minutes) 0   PT Mode of Treatment - Total time(minutes) 30 minutes   PT Cumulative Minutes 766   Therapy Time missed   Time missed? No

## 2025-05-20 NOTE — ASSESSMENT & PLAN NOTE
Likely due to recent chemo as well as sepsis, bacteremia.  Status post G-CSF 5/17, 5/18.  Improving.    Antibiotic plan as above  Continue to monitor CBC

## 2025-05-20 NOTE — ASSESSMENT & PLAN NOTE
Recent positive blood culture on 4/29 due to UTI versus PICC, status post 7 days of IV antibiotic thru 5/5.  Now with recurrence due to UTI given positive urine culture, increased perinephric stranding seen on CT.  Consider PICC infection, less likely.    Change antibiotics to Duricef to continue through 5/30 for 14 days total antibiotics  OK to keep PICC

## 2025-05-20 NOTE — ASSESSMENT & PLAN NOTE
5/20- Antibx changed from cefepime to ceftriaxone per ID recommendations. Need to follow up final susceptibilities and adjust antibiotics accordingly  Will discuss with IM / ID about transitioning to oral antibiotics if possible for discharge

## 2025-05-20 NOTE — ASSESSMENT & PLAN NOTE
Blood culture urine culture positive for gram-negative rods-E. coli.  Urine culture was positive for E. coli was found to be tachycardic and hypotensive and had leukopenia.  Continue IV antibiotic per infectious disease

## 2025-05-20 NOTE — ASSESSMENT & PLAN NOTE
Hematology is following.     Complicated by obstructive hydrocephalus, status post EVD on 4/13 through 4/19.    Status post rituximab, status post high-dose methotrexate and intrathecal cytarabine on 4/17.  Repeat dose of rituximab on 5/10  Needs high dose methotrexate -currently on hold given suspected sepsis.  Blood cultures positive for gram-negative rods and urine culture also positive for gram-negative rods.  Infectious disease following.  Currently on cefepime.  Continue management per infectious disease.  Will need urinary alkalinization with methotrexate administration.   Previously on sodium bicarb infusion, currently on IV Isolyte.  Goal urine pH is 7.0 or above.   Continue to monitor volume status, if plan to start on methotrexate, will consider switching to bicarb drip.  For now continue IV Isolyte at current rate

## 2025-05-20 NOTE — PLAN OF CARE
Problem: SAFETY ADULT  Goal: Patient will remain free of falls  Description: INTERVENTIONS:- Educate patient/family on patient safety including physical limitations- Instruct patient to call for assistance with activity - Consult OT/PT to assist with strengthening/mobility - Keep Call bell within reach- Keep bed low and locked with side rails adjusted as appropriate- Keep care items and personal belongings within reach- Initiate and maintain comfort rounds- Make Fall Risk Sign visible to staff- Offer Toileting every 2 Hours, in advance of need- Initiate/Maintain bed alarm- Obtain necessary fall risk management equipment:  - Apply yellow socks and bracelet for high fall risk patients- Consider moving patient to room near nurses station  Outcome: Progressing

## 2025-05-20 NOTE — ASSESSMENT & PLAN NOTE
Per H-O  Platelets dropped to 66 on 5/18 and on 5/19 was up to 79  For platelet transfusion if counts drop below 20K or in setting of any spontaneous bleeding

## 2025-05-20 NOTE — PROGRESS NOTES
"Progress Note - Hospitalist   Name: Alexis Dennison 64 y.o. male I MRN: [id number]  Unit/Bed#: -01 I Date of Admission: 5/7/2025   Date of Service: 5/20/2025 I Hospital Day: 13    Assessment & Plan  Primary central nervous system (CNS) lymphoma  Patient with development of worsening confusion, and was seen in the ED on 3/24/2025.  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  Lymphoma/leukemia panel was nondiagnostic  CTH 4/3/25: concerning for worsening hydrocephalus.   Repeat LP 4/7/25 = undiagnostic again   MRI brain 4/11/25: \"Interval enlargement of the dominant left anterior basal ganglionic mass lesion with greater surrounding vasogenic edema and mass effect. 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  S/p EVD, self removed 4/26  S/p Keppra 500mg BID x 7 days for postoperative seizure ppx   Started on chemotherapy during hospital stay and is for weekly Rituximab and Methotrexate every 2 weeks  Continue Dexamethasone taper = LD was this AM 5/19/25  Maintain PICC line  H-O following  Had Rituxan 5/10/25  Was due for Methotrexate on 5/17 but labs and VS were concerning for infection so sepsis workup was initiated. Now with recurrence of bacteremia. Chemo on hold for now.    Type 2 diabetes mellitus with hyperglycemia, without long-term current use of insulin ([facility])  Hemoglobin A1C was 6.6 on 2/22/25  Sees Endo [facility] in [place]  Home:  Janumet XR  " qd  Here: Lispro 6 U TID/Januvia 50mg qd  Continue DM diet  On QID Accuchecks with SSI Algo 1  BSs have continued to improve with steroid being tapered  = LD was 5/19 5/17 creat bumped up to 1.38 so Metformin was placed on hold  Creatinine is 1.20 again today 5/20.  If he stays stable, will restart Metformin and stop the Lispro WM.  He did have contrast on 5/18/25  Mixed hyperlipidemia  Continue atorvastatin  Did have slightly elevated LFT's on prior labs but most recently normal, consider holding statin if LFTs trend back up  Thyroid nodule  TSH/free T4 4/21/25 = 0.019/1.13  Nonemergent outpatient endocrine follow-up.   Will require outpatient TSH, free T4, +/- further imaging with endocrine such as radioactive uptake  Repeat TSH/free T4 done 5/12 = TSH was 0.287 with free T4 0.85  Endo to see and advise  Pulmonary nodule  CT chest 3 months follow-up  Liver lesion  Patient will require outpatient follow-up  Encephalopathy  Continue Seroquel  S/p steroid taper- last dose was 5/19/25  On 1:1  LETY (acute kidney injury) (HCC)  Monitor status post methotrexate  Peak creat was 2.77 on 4/22/25  Previous baseline as OP 5/2023-2/22/25 was 1.1 to 1.0  CMP 5/9/25 with creatinine of 1.18 down from 1.23 on 5/8 and on 5/10 was 1.15  CMP on 5/12/25 showed creatinine stable at 1.1  5/17: creat up again to 1.38 = Being followed by Nephrology. Met sepsis criteria 5/17 and placed on IVF ordered as per sepsis protocol  5/19: creat trending down and is 1.20 -->  IVF was  reduced to 50ml/hr per renal.  Today 5/20/25, they want to continue same IVF  Sepsis (HCC)  Patient noted to have hypotension and tachycardia on 5/17  Sepsis workup ordered and found to have positive blood cultures again, growing E coli as before  ID reconsulted  Started Cefepime 5/17/25 = await blood urine cx results but preliminary results of urine >100,000 GNR and prelim blood cx = GNR with blood cx ID panel positive for E.coli  Continue IVF = renal managing  He  "has had stable BP, tachycardia has resolved and he has had no fevers  Updated plan from ID as below  Recurrent E. coli bacteremia  Patient completed 7 days of IV cefazolin which was finished on May 5  CT of A/P was done 5/18 per recommendation from ID looking for possible reason for recurrent bacteremia = was unrevealing  On Cetriaxone --> ID now changed to Duricef to continue through 5/30 for a 14 days total of antibiotic tx  HTN (hypertension)  Home:  Losartan -25 qd  Here:  no meds   Stable BP now  Transaminitis  AST is 111, previously 114,  ALT is 322 previously 149  D/w H-O, they are aware =  \"Could be due to recent chemotherapy versus antibiotics versus liver lesions\".    CMP 5/9/25 = AST 57 (previously 111),  (previously 322)  Resolved  Leukopenia  WBC was stable at 5.9 on 5/12/25 but 5/15/25 is down to 1.84  H-O following  Sepsis workup ongoing but ID felt likely 2/2 Rituxin  Hyponatremia  resolved  Thrombocytopenia (HCC)  Per H-O  Platelets dropped to 66 on 5/18 and on 5/19 was up to 79  For platelet transfusion if counts drop below 20K or in setting of any spontaneous bleeding   Anemia  Hgb dropped to 6.3 on 5/18 and was confirmed by repeat lab  at 6.7 so was transfused with one unit of PRBCs after discussion with Oncology  Spoke with wife over the phone and consent obtained  On 5/19/25, hemoglobin was up to 8.0 = appropriate response to transfusion    The above assessment and plan was reviewed and updated as determined by my evaluation of the patient on 5/20/2025.    History of Present Illness   Patient seen and examined. Patients overnight issues or events were reviewed with nursing staff. New or overnight issues include the following:   No new or overnight issues.  Offers no complaints    Review of Systems   All other systems reviewed and are negative.      Objective :  Temp:  [97.6 °F (36.4 °C)-97.8 °F (36.6 °C)] 97.8 °F (36.6 °C)  HR:  [62-74] 62  BP: (127-139)/(69-80) 139/80  Resp:  " [16-18] 16  SpO2:  [95 %-99 %] 95 %  O2 Device: None (Room air)    Invasive Devices       Peripherally Inserted Central Catheter Line  Duration             PICC Line 05/01/25 Left Brachial 18 days                    Physical Exam  General Appearance: no distress, nontoxic appearing  HEENT:  External ear normal.  Nose normal w/o drainage. Mucous membranes are moist. Oropharynx is clear. Conjunctiva clear w/o icterus or redness.  Neck:  Supple, normal ROM  Lungs: BBS without crackles/wheeze/rhonchi; respirations unlabored with normal inspiratory/expiratory effort.  No retractions noted.  On RA  CV: regular rate and rhythm; no rubs/murmurs/gallops, PMI normal   ABD: Abdomen is soft.  Bowel sounds all quadrants.  Nontender with no distention.    EXT: no edema  Skin: normal turgor, normal texture  Psych: affect normal, mood normal  Neuro: AA       The above physical exam was reviewed and updated as determined by my evaluation of the patient on 5/20/2025.      Lab Results: I have reviewed the following results:  Results from last 7 days   Lab Units 05/19/25  0559 05/18/25  0856   WBC Thousand/uL 3.93* 1.84*   HEMOGLOBIN g/dL 8.0* 6.7*   HEMATOCRIT % 23.7* 20.1*   PLATELETS Thousands/uL 79* 66*     Results from last 7 days   Lab Units 05/20/25  0541 05/19/25  0559   SODIUM mmol/L 141 140   POTASSIUM mmol/L 3.5 3.9   CHLORIDE mmol/L 106 107   CO2 mmol/L 29 27   BUN mg/dL 19 19   CREATININE mg/dL 1.20 1.20   CALCIUM mg/dL 8.3* 7.9*             Results from last 7 days   Lab Units 05/20/25  0602 05/19/25  2104 05/19/25  1549   POC GLUCOSE mg/dl 95 147* 175*       Imaging Results Review: No pertinent imaging studies reviewed.  Other Study Results Review: No additional pertinent studies reviewed.    Review of Scheduled Meds: Medications  reviewed and reconciled as needed  Current Facility-Administered Medications   Medication Dose Route Frequency Provider Last Rate    acetaminophen  650 mg Oral Q6H PRN Crispin Arana MD       allopurinol  300 mg Oral Daily Crispin Arana MD      alteplase  2 mg Intracatheter Q1MIN PRN Crispin Arana MD      alteplase  2 mg Intracatheter Q1MIN PRN Magali Lee MD      aluminum-magnesium hydroxide-simethicone  30 mL Oral Q4H PRN Crispin Arana MD      atorvastatin  20 mg Oral Daily Crispin Arana MD      bisacodyl  10 mg Rectal Daily PRN Skyler Hendrickson MD      calcium carbonate  1,000 mg Oral Daily PRN Crispin Arana MD      cefTRIAXone  2,000 mg Intravenous Q24H Lisa Singh MD 2,000 mg (05/19/25 2034)    chlorhexidine  15 mL [facility]outh[facility]Throat Q12H [facility] MD      docusate sodium  100 mg Oral BID Jessica T Arreola, DO      heparin (porcine)  5,000 Units Subcutaneous Q8H CAIT Arana MD      insulin lispro  1-5 Units Subcutaneous TID AC PATI Porras      insulin lispro  1-5 Units Subcutaneous HS PATI Porras      insulin lispro  6 Units Subcutaneous TID With Meals PATI Porras      lactulose  20 g Oral Daily PRN Joshua Kaminski,       melatonin  6 mg Oral HS Crispin Arana MD      [Held by provider] metFORMIN  1,000 mg Oral Daily Crispin Arana MD      multi-electrolyte  50 mL/hr Intravenous Continuous Johnny Naik MD 50 mL/hr (05/20/25 0950)    OLANZapine  5 mg Intramuscular BID PRN Crispin Arana MD      ondansetron  4 mg Intravenous Q6H PRN Crispin Arana MD      oxyCODONE  2.5 mg Oral Q4H PRN Crispin Arana MD      Or    oxyCODONE  5 mg Oral Q4H PRN Crispin Arana MD      pantoprazole  40 mg Oral Early Morning Crispin Arana MD      polyethylene glycol  17 g Oral Daily PRN Skyler Hendrickson MD      QUEtiapine  12.5 mg Oral Daily Crispin Arana MD      QUEtiapine  50 mg Oral HS Crispin Arana MD      senna  2 tablet Oral Daily With Lunch Jessica Arreola, DO      sitaGLIPtin  50 mg Oral Daily PATI Porras         VTE Pharmacologic Prophylaxis: HSQ  Code Status: Level 1 - Full Code  Current Length of Stay: 13  day(s)    Administrative Statements     ** Please Note:  voice to text software may have been used in the creation of this document. Although proof errors in transcription or interpretation are a potential of such software**

## 2025-05-20 NOTE — ASSESSMENT & PLAN NOTE
Patient completed 7 days of IV cefazolin which was finished on May 5  CT of A/P was done 5/18 per recommendation from ID looking for possible reason for recurrent bacteremia = was unrevealing  On Cetriaxone --> ID now changed to Duricef to continue through 5/30 for a 14 days total of antibiotic tx

## 2025-05-20 NOTE — ASSESSMENT & PLAN NOTE
Lab Results   Component Value Date    HGBA1C 6.6 (H) 02/22/2025       Recent Labs     05/19/25  1038 05/19/25  1549 05/19/25  2104 05/20/25  0602   POCGLU 226* 175* 147* 95       Blood Sugar Average: Last 72 hrs:  (P) [id number]    - Metformin 1000mg daily ON HOLD - restart when Cr remains stable per IM  - Lantus 10u daily with Lispro 8u TID and SSI  - Monitor accuchecks while on steroids  - Management per IM

## 2025-05-20 NOTE — PROGRESS NOTES
05/20/25 0900   Pain Assessment   Pain Assessment Tool 0-10   Pain Score No Pain   Restrictions/Precautions   Precautions 1:1;Bed/chair alarms;Cognitive;Fall Risk;Limb alert;Supervision on toilet/commode   Weight Bearing Restrictions No   ROM Restrictions No   Comprehension   Comprehension (FIM) 3 - Understands basic info/conversation 50-74% of time   Expression   Expression (FIM) 4 - Expresses basic info/needs 75-90% of time   Social Interaction   Social Interaction (FIM) 5 - Interacts appropriately with others 90% of time   Problem Solving   Problem solving (FIM) 2 - Solves basic problems 25-49% of time   Memory   Memory (FIM) 2 - Recognizes, recalls/performs 25-49%   Speech/Language/Cognition Assessmetn   Treatment Assessment Pt seen for skilled speech therapy session targeting cognitive linguistic communication skills. Pt alert and interactive- completed the following skilled therapy tasks. Engaged in basic orientation with place, situation and date. Pt was unable to recall any on his own, therefore SLP cued to look around room for hints/signs to help him. Pt was able to locate [facility] orientation sign on his wall. SLP educated as to which location he was at, which pt appeared to recognize. Pt also able to read the month being May and year being 2025. Pt unable to recall reason for admission- SLP provided brief review of this and overall goals to return home. Pt is on track from therapy perspective for discharge home Thursday, all pending medical stability as well. Pt also engaged in deduction task with multiple meanings- pt was given a description and list of words. Pt then was able to choose words that best fit using multiple meanings- overall min to mod A, increasing with repetition and verbal cues for thought organization. Pt overall is improving with skilled SLP services and will cont to benefit to maximize overall cognitive linguistic communication abilities at this time.   SLP Therapy Minutes   SLP  Time In 0900   SLP Time Out 0930   SLP Total Time (minutes) 30   SLP Mode of treatment - Individual (minutes) 30   SLP Mode of treatment - Concurrent (minutes) 0   SLP Mode of treatment - Group (minutes) 0   SLP Mode of treatment - Co-treat (minutes) 0   SLP Mode of Treatment - Total time(minutes) 30 minutes   SLP Cumulative Minutes 510   Therapy Time missed   Time missed? No

## 2025-05-20 NOTE — ASSESSMENT & PLAN NOTE
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  > Started on high dose steroids and methotrexate every 2 weeks for 4 weeks (C1 received 4/18, C2 received 5/2) then maintenance every 4 weeks with Rituximab weekly for 8 weeks (1st dose Thursday 4/24 and second 5/3)  > Admitted to [facility] with ongoing cognitive deficits and delirium  > Completed Decadron taper on 5/19    - MTX + Rituximab ON HOLD as per Hem/onc  - Daily methotrexate levels when/if medication resumes  - Hem/Onc following  - NSGY following  - SLP for cognition

## 2025-05-20 NOTE — PROGRESS NOTES
Progress Note - PMR   Name: Alexis Dennison 64 y.o. 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  > Started on high dose steroids and methotrexate every 2 weeks for 4 weeks (C1 received 4/18, C2 received 5/2) then maintenance every 4 weeks with Rituximab weekly for 8 weeks (1st dose Thursday 4/24 and second 5/3)  > Admitted to [facility] with ongoing cognitive deficits and delirium  > Completed Decadron taper on 5/19    - MTX + Rituximab ON HOLD as per Hem/onc  - Daily methotrexate levels when/if medication resumes  - Hem/Onc following  - NSGY following  - SLP for cognition  Type 2 diabetes mellitus with hyperglycemia, without long-term current use of insulin (HCC)  Lab Results   Component Value Date    HGBA1C 6.6 (H) 02/22/2025       Recent Labs     05/19/25  1038 05/19/25  1549 05/19/25  2104 05/20/25  0602   POCGLU 226* 175* 147* 95       Blood Sugar Average: Last 72 hrs:  (P) [id number]    - Metformin 1000mg daily ON HOLD - restart when Cr remains stable per IM  - Lantus 10u daily with Lispro 8u TID and SSI  - Monitor accuchecks while on steroids  - Management per IM  HTN, goal below 130/80  > Blood pressure controlled off antihypertensives    - Monitor VS  - Management per IM  Mixed hyperlipidemia  - Continue atorvastatin 10 mg daily  Thyroid nodule  Outpatient endocrine follow-up needed  Pulmonary nodule  > CTA 3/29:  nonspecific 4 mm RLL pulmonary nodule   - follow up outpatient for repeat imaging in 3 months  Liver lesion  > CTA 3/29- nonspecific 12mm hypodense right hepatic lesion, recommended MRI abdomen  > MRI abdomen 3/30- simple right hepatic lobe cyst    - Follow up outpatient for monitoring  Ambulatory dysfunction  - Fall precautions  - PT/OT  Encephalopathy  > Patient has been pleasantly confused for months in the setting of brain mass.  > acutely worsened due to UTI and bacteremia. S/p 7 day course of antibx  > Continues to lack insight to his current situation and is severely cognitively impaired.  He does not get agitated for long periods of time and is mostly confused and impulsive.  > A&O 1 on [facility] admission    - Continue seroquel 12.5mg at noon with 50mg HS  - PRN Zyprexa 2.5mg IM as needed for agitation  -Overstimulation precautions, frequent re-orientation, re-direction, re-assurance  -Sleep log and agitation monitoring if needed  -Optimize sleep-wake cycle  -Limit sedating medications when possible  - Ensure optimal management electrolytes, nutrition, and hydration  - Ensure optimal bowel/bladder management  - Ensure optimal pain management   -Patient/family/caregiver education and training   -Continue 1:1 due to overall safety, impulsivity, lack of safety awareness and poor insight.  -Fall precautions with frequent rounding; proactive toileting program, patient should not be unattended in bathroom      LETY (acute kidney injury) (HCC)  > Baseline Cr 0.9  > Recently 1.3 improved from before- peak 2.7  > Now s/p bicarb    - IVF per IM/Nephro  - Management at discretion of nephro   E. coli UTI  > Pet met sepsis criteria on 4/29 with confusion and agitation. UA was positive. Urine culture and blood cultures showed Ecoli sensitive to cephalosporins.  > ID consulted and pt completed 7d course of ceftriaxone  > Approved to restart chemo 5/2.  > Chemo on hold for GNR UTI and bacteremia    - Monitor while on  antibiotics  Impaired mobility and activities of daily living  Patient was evaluated by the rehabilitation team MD and an appropriate candidate for acute inpatient rehabilitation program at this time.  The patient will tolerate 3 hours/day 5 to 7 days/week of intensive physical, occupational in order to obtain goals for community discharge  Due to the patient's functional Compared to their baseline level of function in addition to their ongoing medical needs, the patient would benefit from daily supervision from a rehabilitation physician as well as rehabilitation nursing to implement and adjust the medical as well as functional plan of care in order to meet the patient's goals.  HTN (hypertension)  Had been on losartan but holding at this time- his blood pressures are good  Transaminitis  CMP checked on 5/9 and AST is down to 57 from 111 and ALT to 281 from 322  5/19 labs: AST 11 and ALT 25.  Hyponatremia  Sodium stable at 140 on 5/19 labs.  Thrombocytopenia (HCC)  5/19- Platelets down to 79K- will need to continue monitoring with chemotherapy now on hold  Sepsis (HCC)  5/19- Sepsis alert over the weekend.  CT A/P showed perinephric stranding. UA abnormal and Ucx grew GNR in urine. Blood cultures also + for GNR. Lactate 4.4>5.1. procal 7.39> 6.55. Started on IV antibiotics and Chemo on hold per Onc  - Continue antibiotics  - Monitor WBC on routine labs  - Follow temp curve  Recurrent E. coli bacteremia  5/20- Antibx changed from cefepime to ceftriaxone per ID recommendations. Need to follow up final susceptibilities and adjust antibiotics accordingly  Will discuss with IM / ID about transitioning to oral antibiotics if possible for discharge  Leukopenia  5/19- WBC improving. Now 3.93 from 1.84 off chemo  Anemia  5/19 - hemoglobin of 8.0 - improved from 6.7 after transfusion of 1u pRBC over the weekend  At risk for acid-base imbalance  Urine pH testing and sodium bicarb drip with MTX treatments (when  resumed)  Electrolyte imbalance risk    SIRS (systemic inflammatory response syndrome) (HCC)      Subjective   64-year-old male with history of type 2 diabetes, GERD, hyperlipidemia, hypertension, nonalcoholic fatty liver disease and sleep apnea who presents to [facility] on 3/29/2025 for increasing confusion. He had been recently diagnosed with a brain mass and family reported that he had been having increased symptoms including forgetfulness over the past several months. Initial workup was negative however he was seen in the ER on 3/24 and a CT of the head was concerning for brain lesion. 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 Additional areas of enhancement in the basal ganglia on the left as well as the left cerebral peduncle/left quirino. There was an initial plan to have an outpatient neurosurgery evaluation but because of his worsening symptoms including visual deficits he came to the ER sooner. A CT of the chest abdomen pelvis was completed on 3/29 showing a right lower lobe pulmonary nodule and nonspecific hepatic lesion and MRI was completed revealing a simple right hepatic lobe cyst. A diagnostic lumbar puncture was completed on 3/31 suspicious for CNS lymphoma and a repeat CT completed on 4/3 showed worsening hydrocephalus and MRI on 4/11 showed disease progression. Biopsy was done and EVD placed on 4/14 and the patient self extubated postprocedure. The preliminary results from the biopsy were indicative of a small round blue cell tumor suggestive of non-Hodgkin's lymphoma. He was started on high-dose steroids, methotrexate and rituximab by oncology but did have a complicated course including LETY, intermittent agitated delirium requiring medications as well as continuous observation as well as an E. coli UTI with bacteremia status post course of antibiotics. The EVD was removed on 4/26. Patient was then  transferred to [facility].     Chief Complaint: f/u non-traumatic brain injury and f/u ambulatory dysfunction    Interval: Patient seen and examined. No acute overnight events. Patient feeling well today. States he thinks he slept well; sleep log reviewed and noted that he did wake up earlier than usual. Otherwise, denies any fevers, chills, chest pain, sob, abdominal pain, nausea/vomiting, lightheadedness or dizziness. Eating 100% of meals. Last BM 5/18. Repeat BMP today is unremarkable: Cr is stable at 1.20. Informed patient that dispo is pending antibiotic plan per ID/IM.    Objective :  Temp:  [97.6 °F (36.4 °C)-97.8 °F (36.6 °C)] 97.8 °F (36.6 °C)  HR:  [62-74] 62  BP: (127-139)/(69-80) 139/80  Resp:  [16-18] 16  SpO2:  [95 %-99 %] 95 %  O2 Device: None (Room air)    Functional Update:  Mobility: Sup bed mobility, CS with RW for ambulation (120') and CS/CGA with no AD for ambulation (250')  Transfers: Sup with and without RW  ADLs: Sup UBD/footwear, CGA LBD    Physical Exam  Vitals reviewed.   Constitutional:       Appearance: Normal appearance.   HENT:      Head: Normocephalic and atraumatic.      Right Ear: External ear normal.      Left Ear: External ear normal.      Nose: Nose normal.      Mouth/Throat:      Mouth: Mucous membranes are moist.      Pharynx: Oropharynx is clear.     Eyes:      Conjunctiva/sclera: Conjunctivae normal.       Cardiovascular:      Rate and Rhythm: Normal rate and regular rhythm.      Pulses: Normal pulses.      Heart sounds: Normal heart sounds.   Pulmonary:      Effort: Pulmonary effort is normal. No respiratory distress.      Breath sounds: Normal breath sounds. No wheezing.   Abdominal:      General: There is no distension.      Palpations: Abdomen is soft.      Comments: +normoactive bowel sounds     Musculoskeletal:      Cervical back: Normal range of motion.     Skin:     General: Skin is warm and dry.     Neurological:      Mental Status: He is alert.         Scheduled  Meds:  Current Facility-Administered Medications   Medication Dose Route Frequency Provider Last Rate    acetaminophen  650 mg Oral Q6H PRN Crispin Arana MD      allopurinol  300 mg Oral Daily Crispin Arana MD      alteplase  2 mg Intracatheter Q1MIN PRN Crispin Arana MD      alteplase  2 mg Intracatheter Q1MIN PRN Magali Lee MD      aluminum-magnesium hydroxide-simethicone  30 mL Oral Q4H PRN Crispin Arana MD      atorvastatin  20 mg Oral Daily Crispin Arana MD      bisacodyl  10 mg Rectal Daily PRN Skyler Hendrickson MD      calcium carbonate  1,000 mg Oral Daily PRN Crispin Arana MD      cefTRIAXone  2,000 mg Intravenous Q24H Lisa Singh MD 2,000 mg (05/19/25 2034)    chlorhexidine  15 mL Mouth/Throat Q12H CAIT Crispin Arana MD      docusate sodium  100 mg Oral BID Jessica T Arreola, DO      heparin (porcine)  5,000 Units Subcutaneous Q8H CAIT Crispin Arana MD      insulin lispro  1-5 Units Subcutaneous TID AC PATI Porras      insulin lispro  1-5 Units Subcutaneous HS PATI Porras      insulin lispro  6 Units Subcutaneous TID With Meals PATI Porras      lactulose  20 g Oral Daily PRN Joshua Kaminski DO      melatonin  6 mg Oral HS Crispin Arana MD      [Held by provider] metFORMIN  1,000 mg Oral Daily Crispin Arana MD      multi-electrolyte  50 mL/hr Intravenous Continuous Johnny Naik MD 50 mL/hr (05/19/25 1333)    OLANZapine  5 mg Intramuscular BID PRN Crispin Arana MD      ondansetron  4 mg Intravenous Q6H PRN Crispin Arana MD      oxyCODONE  2.5 mg Oral Q4H PRN Crispin Arana MD      Or    oxyCODONE  5 mg Oral Q4H PRN Crispin Arana MD      pantoprazole  40 mg Oral Early Morning Crispin Arana MD      polyethylene glycol  17 g Oral Daily PRN Skyler Hendrickson MD      QUEtiapine  12.5 mg Oral Daily Crispin Arana MD      QUEtiapine  50 mg Oral HS Crispin Arana MD      senna  2 tablet Oral Daily With Lunch Jessica BHUPENDRA Arreola, DO      sitaGLIPtin  50  mg Oral Daily PATI Porras           Lab Results: I have reviewed the following results:  Results from last 7 days   Lab Units 05/19/25  0559 05/18/25  0856 05/18/25  0543   HEMOGLOBIN g/dL 8.0* 6.7* 6.3*   HEMATOCRIT % 23.7* 20.1* 18.9*   WBC Thousand/uL 3.93* 1.84* 1.91*   PLATELETS Thousands/uL 79* 66* 68*     Results from last 7 days   Lab Units 05/20/25  0541 05/19/25  0559 05/18/25  0543 05/17/25  0537   BUN mg/dL 19 19 20 25   SODIUM mmol/L 141 140 140 136   POTASSIUM mmol/L 3.5 3.9 3.5 3.8   CHLORIDE mmol/L 106 107 105 92*   CREATININE mg/dL 1.20 1.20 1.28 1.38*   AST U/L  --  11* 13 17   ALT U/L  --  25 28 44

## 2025-05-21 ENCOUNTER — HOME HEALTH ADMISSION (OUTPATIENT)
Dept: HOME HEALTH SERVICES | Facility: HOME HEALTHCARE | Age: 65
End: 2025-05-21
Payer: COMMERCIAL

## 2025-05-21 LAB
ALBUMIN SERPL BCG-MCNC: 3.5 G/DL (ref 3.5–5)
ALP SERPL-CCNC: 70 U/L (ref 34–104)
ALT SERPL W P-5'-P-CCNC: 40 U/L (ref 7–52)
ANION GAP SERPL CALCULATED.3IONS-SCNC: 10 MMOL/L (ref 4–13)
ANISOCYTOSIS BLD QL SMEAR: PRESENT
AST SERPL W P-5'-P-CCNC: 24 U/L (ref 13–39)
BACTERIA UR QL AUTO: ABNORMAL /HPF
BACTERIA UR QL AUTO: NORMAL /HPF
BASOPHILS # BLD MANUAL: 0 THOUSAND/UL (ref 0–0.1)
BASOPHILS NFR MAR MANUAL: 0 % (ref 0–1)
BILIRUB SERPL-MCNC: 0.49 MG/DL (ref 0.2–1)
BILIRUB UR QL STRIP: NEGATIVE
BUN SERPL-MCNC: 19 MG/DL (ref 5–25)
CALCIUM SERPL-MCNC: 9.1 MG/DL (ref 8.4–10.2)
CHLORIDE SERPL-SCNC: 105 MMOL/L (ref 96–108)
CLARITY UR: CLEAR
CO2 SERPL-SCNC: 27 MMOL/L (ref 21–32)
COLOR UR: ABNORMAL
COLOR UR: COLORLESS
COLOR UR: NORMAL
CREAT SERPL-MCNC: 1.28 MG/DL (ref 0.6–1.3)
DIFFERENTIAL COMMENT: ABNORMAL
EOSINOPHIL # BLD MANUAL: 0 THOUSAND/UL (ref 0–0.4)
EOSINOPHIL NFR BLD MANUAL: 0 % (ref 0–6)
ERYTHROCYTE [DISTWIDTH] IN BLOOD BY AUTOMATED COUNT: 15.9 % (ref 11.6–15.1)
GFR SERPL CREATININE-BSD FRML MDRD: 58 ML/MIN/1.73SQ M
GLUCOSE SERPL-MCNC: 165 MG/DL (ref 65–140)
GLUCOSE SERPL-MCNC: 185 MG/DL (ref 65–140)
GLUCOSE SERPL-MCNC: 208 MG/DL (ref 65–140)
GLUCOSE SERPL-MCNC: 264 MG/DL (ref 65–140)
GLUCOSE SERPL-MCNC: 87 MG/DL (ref 65–140)
GLUCOSE UR STRIP-MCNC: ABNORMAL MG/DL
GLUCOSE UR STRIP-MCNC: NEGATIVE MG/DL
GLUCOSE UR STRIP-MCNC: NEGATIVE MG/DL
HCT VFR BLD AUTO: 32 % (ref 36.5–49.3)
HGB BLD-MCNC: 10.3 G/DL (ref 12–17)
HGB UR QL STRIP.AUTO: NEGATIVE
KETONES UR STRIP-MCNC: NEGATIVE MG/DL
LEUKOCYTE ESTERASE UR QL STRIP: NEGATIVE
LYMPHOCYTES # BLD AUTO: 3.79 THOUSAND/UL (ref 0.6–4.47)
LYMPHOCYTES # BLD AUTO: 36 % (ref 14–44)
MACROCYTES BLD QL AUTO: PRESENT
MCH RBC QN AUTO: 30.5 PG (ref 26.8–34.3)
MCHC RBC AUTO-ENTMCNC: 32.2 G/DL (ref 31.4–37.4)
MCV RBC AUTO: 95 FL (ref 82–98)
METAMYELOCYTE ABSOLUTE CT: 0.18 THOUSAND/UL (ref 0–0.1)
METAMYELOCYTES NFR BLD MANUAL: 2 % (ref 0–1)
MONOCYTES # BLD AUTO: 0.81 THOUSAND/UL (ref 0–1.22)
MONOCYTES NFR BLD: 9 % (ref 4–12)
MYELOCYTE ABSOLUTE CT: 0.18 THOUSAND/UL (ref 0–0.1)
MYELOCYTES NFR BLD MANUAL: 2 % (ref 0–1)
NEUTROPHILS # BLD MANUAL: 4.06 THOUSAND/UL (ref 1.85–7.62)
NEUTS BAND NFR BLD MANUAL: 3 % (ref 0–8)
NEUTS SEG NFR BLD AUTO: 42 % (ref 43–75)
NITRITE UR QL STRIP: NEGATIVE
NON-SQ EPI CELLS URNS QL MICRO: ABNORMAL /HPF
NON-SQ EPI CELLS URNS QL MICRO: NORMAL /HPF
PATHOLOGY REVIEW: YES
PH UR STRIP.AUTO: 5.5 [PH]
PH UR STRIP.AUTO: 7.5 [PH]
PH UR STRIP.AUTO: 8 [PH]
PLATELET # BLD AUTO: 144 THOUSANDS/UL (ref 149–390)
PLATELET BLD QL SMEAR: ADEQUATE
PMV BLD AUTO: 10.5 FL (ref 8.9–12.7)
POTASSIUM SERPL-SCNC: 3.8 MMOL/L (ref 3.5–5.3)
PROT SERPL-MCNC: 6.1 G/DL (ref 6.4–8.4)
PROT UR STRIP-MCNC: ABNORMAL MG/DL
PROT UR STRIP-MCNC: NEGATIVE MG/DL
PROT UR STRIP-MCNC: NEGATIVE MG/DL
RBC # BLD AUTO: 3.38 MILLION/UL (ref 3.88–5.62)
RBC #/AREA URNS AUTO: ABNORMAL /HPF
RBC #/AREA URNS AUTO: NORMAL /HPF
RBC MORPH BLD: PRESENT
SODIUM SERPL-SCNC: 142 MMOL/L (ref 135–147)
SP GR UR STRIP.AUTO: 1.01 (ref 1–1.03)
SP GR UR STRIP.AUTO: 1.01 (ref 1–1.03)
SP GR UR STRIP.AUTO: 1.02 (ref 1–1.03)
TSH SERPL DL<=0.05 MIU/L-ACNC: 1.95 UIU/ML (ref 0.45–4.5)
UROBILINOGEN UR STRIP-ACNC: <2 MG/DL
VARIANT LYMPHS # BLD AUTO: 6 %
WBC # BLD AUTO: 9.02 THOUSAND/UL (ref 4.31–10.16)
WBC #/AREA URNS AUTO: ABNORMAL /HPF
WBC #/AREA URNS AUTO: NORMAL /HPF

## 2025-05-21 PROCEDURE — 84443 ASSAY THYROID STIM HORMONE: CPT | Performed by: INTERNAL MEDICINE

## 2025-05-21 PROCEDURE — 99232 SBSQ HOSP IP/OBS MODERATE 35: CPT | Performed by: NURSE PRACTITIONER

## 2025-05-21 PROCEDURE — 97110 THERAPEUTIC EXERCISES: CPT

## 2025-05-21 PROCEDURE — 97116 GAIT TRAINING THERAPY: CPT

## 2025-05-21 PROCEDURE — 85027 COMPLETE CBC AUTOMATED: CPT | Performed by: STUDENT IN AN ORGANIZED HEALTH CARE EDUCATION/TRAINING PROGRAM

## 2025-05-21 PROCEDURE — 97535 SELF CARE MNGMENT TRAINING: CPT

## 2025-05-21 PROCEDURE — 99233 SBSQ HOSP IP/OBS HIGH 50: CPT | Performed by: INTERNAL MEDICINE

## 2025-05-21 PROCEDURE — 99232 SBSQ HOSP IP/OBS MODERATE 35: CPT | Performed by: INTERNAL MEDICINE

## 2025-05-21 PROCEDURE — 85007 BL SMEAR W/DIFF WBC COUNT: CPT | Performed by: STUDENT IN AN ORGANIZED HEALTH CARE EDUCATION/TRAINING PROGRAM

## 2025-05-21 PROCEDURE — 97129 THER IVNTJ 1ST 15 MIN: CPT

## 2025-05-21 PROCEDURE — 99233 SBSQ HOSP IP/OBS HIGH 50: CPT | Performed by: STUDENT IN AN ORGANIZED HEALTH CARE EDUCATION/TRAINING PROGRAM

## 2025-05-21 PROCEDURE — 80053 COMPREHEN METABOLIC PANEL: CPT | Performed by: STUDENT IN AN ORGANIZED HEALTH CARE EDUCATION/TRAINING PROGRAM

## 2025-05-21 PROCEDURE — 97530 THERAPEUTIC ACTIVITIES: CPT

## 2025-05-21 PROCEDURE — 81003 URINALYSIS AUTO W/O SCOPE: CPT | Performed by: INTERNAL MEDICINE

## 2025-05-21 PROCEDURE — 81001 URINALYSIS AUTO W/SCOPE: CPT | Performed by: INTERNAL MEDICINE

## 2025-05-21 PROCEDURE — 82948 REAGENT STRIP/BLOOD GLUCOSE: CPT

## 2025-05-21 PROCEDURE — G0545 PR INHERENT VISIT TO INPT: HCPCS | Performed by: INTERNAL MEDICINE

## 2025-05-21 PROCEDURE — 3E03305 INTRODUCTION OF OTHER ANTINEOPLASTIC INTO PERIPHERAL VEIN, PERCUTANEOUS APPROACH: ICD-10-PCS | Performed by: INTERNAL MEDICINE

## 2025-05-21 PROCEDURE — 97130 THER IVNTJ EA ADDL 15 MIN: CPT

## 2025-05-21 PROCEDURE — 81001 URINALYSIS AUTO W/SCOPE: CPT | Performed by: STUDENT IN AN ORGANIZED HEALTH CARE EDUCATION/TRAINING PROGRAM

## 2025-05-21 PROCEDURE — 85060 BLOOD SMEAR INTERPRETATION: CPT | Performed by: PATHOLOGY

## 2025-05-21 RX ORDER — SODIUM CHLORIDE 9 MG/ML
20 INJECTION, SOLUTION INTRAVENOUS ONCE AS NEEDED
Status: DISCONTINUED | OUTPATIENT
Start: 2025-05-21 | End: 2025-05-27

## 2025-05-21 RX ADMIN — QUETIAPINE 50 MG: 25 TABLET ORAL at 21:10

## 2025-05-21 RX ADMIN — HEPARIN SODIUM 5000 UNITS: 5000 INJECTION INTRAVENOUS; SUBCUTANEOUS at 13:36

## 2025-05-21 RX ADMIN — QUETIAPINE 12.5 MG: 25 TABLET ORAL at 11:45

## 2025-05-21 RX ADMIN — ATORVASTATIN CALCIUM 20 MG: 20 TABLET, FILM COATED ORAL at 08:18

## 2025-05-21 RX ADMIN — INSULIN LISPRO 1 UNITS: 100 INJECTION, SOLUTION INTRAVENOUS; SUBCUTANEOUS at 11:49

## 2025-05-21 RX ADMIN — PANTOPRAZOLE SODIUM 40 MG: 40 TABLET, DELAYED RELEASE ORAL at 05:41

## 2025-05-21 RX ADMIN — CEFADROXIL 1000 MG: 500 CAPSULE ORAL at 21:11

## 2025-05-21 RX ADMIN — HEPARIN SODIUM 5000 UNITS: 5000 INJECTION INTRAVENOUS; SUBCUTANEOUS at 05:41

## 2025-05-21 RX ADMIN — DOCUSATE SODIUM 100 MG: 100 CAPSULE, LIQUID FILLED ORAL at 08:18

## 2025-05-21 RX ADMIN — CHLORHEXIDINE GLUCONATE 15 ML: 1.2 SOLUTION ORAL at 08:18

## 2025-05-21 RX ADMIN — INSULIN LISPRO 6 UNITS: 100 INJECTION, SOLUTION INTRAVENOUS; SUBCUTANEOUS at 08:22

## 2025-05-21 RX ADMIN — METHOTREXATE 6000 MG: 25 INJECTION, SOLUTION INTRA-ARTERIAL; INTRAMUSCULAR; INTRATHECAL; INTRAVENOUS at 17:19

## 2025-05-21 RX ADMIN — SODIUM BICARBONATE 100 ML/HR: 84 INJECTION, SOLUTION INTRAVENOUS at 12:33

## 2025-05-21 RX ADMIN — ALLOPURINOL 300 MG: 300 TABLET ORAL at 08:18

## 2025-05-21 RX ADMIN — INSULIN LISPRO 1 UNITS: 100 INJECTION, SOLUTION INTRAVENOUS; SUBCUTANEOUS at 17:13

## 2025-05-21 RX ADMIN — DEXAMETHASONE SODIUM PHOSPHATE: 10 INJECTION, SOLUTION INTRAMUSCULAR; INTRAVENOUS at 16:45

## 2025-05-21 RX ADMIN — SODIUM CHLORIDE, SODIUM GLUCONATE, SODIUM ACETATE, POTASSIUM CHLORIDE, MAGNESIUM CHLORIDE, SODIUM PHOSPHATE, DIBASIC, AND POTASSIUM PHOSPHATE 50 ML/HR: .53; .5; .37; .037; .03; .012; .00082 INJECTION, SOLUTION INTRAVENOUS at 04:35

## 2025-05-21 RX ADMIN — INSULIN LISPRO 2 UNITS: 100 INJECTION, SOLUTION INTRAVENOUS; SUBCUTANEOUS at 21:11

## 2025-05-21 RX ADMIN — DOCUSATE SODIUM 100 MG: 100 CAPSULE, LIQUID FILLED ORAL at 17:13

## 2025-05-21 RX ADMIN — CEFADROXIL 1000 MG: 500 CAPSULE ORAL at 08:25

## 2025-05-21 RX ADMIN — CHLORHEXIDINE GLUCONATE 15 ML: 1.2 SOLUTION ORAL at 21:10

## 2025-05-21 RX ADMIN — HEPARIN SODIUM 5000 UNITS: 5000 INJECTION INTRAVENOUS; SUBCUTANEOUS at 21:10

## 2025-05-21 RX ADMIN — Medication 6 MG: at 21:10

## 2025-05-21 RX ADMIN — SITAGLIPTIN 50 MG: 50 TABLET, FILM COATED ORAL at 08:18

## 2025-05-21 RX ADMIN — SENNOSIDES 17.2 MG: 8.6 TABLET, FILM COATED ORAL at 11:45

## 2025-05-21 NOTE — ASSESSMENT & PLAN NOTE
Recent positive blood culture on 4/29 due to UTI versus PICC, status post 7 days of IV antibiotic thru 5/5.  Now with recurrence due to UTI given positive urine culture, increased perinephric stranding seen on CT.  Consider PICC infection, less likely.    Continue Duricef through 5/30 for 14 days total antibiotics  OK to keep PICC

## 2025-05-21 NOTE — ASSESSMENT & PLAN NOTE
Patient completed 7 days of IV cefazolin which was finished on May 5  CT of A/P was done 5/18 per recommendation from ID looking for possible reason for recurrent bacteremia = was unrevealing  Was on Cetriaxone --> ID changed to Duricef to continue through 5/30 for a 14 days total of antibiotic tx

## 2025-05-21 NOTE — ASSESSMENT & PLAN NOTE
Baseline creatinine 0.8 to 1.1.   Etiology suspected to be due to prerenal azotemia/possible early ATN  Peak SCr 2.77 on 4/22/25.   Renal function improved to creatinine 1.2 mg/dL and stable over last few days.  Bicarb level on the higher side at 29.  Continue current IV normal saline at 50 mL/h, clinically appears euvolemic.  Follow-up hematology oncology recommendations with plan to start on chemotherapy with methotrexate would switch to bicarb drip at 100 mL/h and monitor.  Avoid nephrotoxins

## 2025-05-21 NOTE — PROGRESS NOTES
05/21/25 1400   Pain Assessment   Pain Assessment Tool 0-10   Pain Score No Pain   Restrictions/Precautions   Precautions 1:1;Bed/chair alarms;Cognitive;Fall Risk;Limb alert;Multiple lines;Supervision on toilet/commode   Weight Bearing Restrictions No   ROM Restrictions No   Comprehension   Comprehension (FIM) 3 - Understands basic info/conversation 50-74% of time   Expression   Expression (FIM) 4 - Expresses basic info/needs 75-90% of time   Social Interaction   Social Interaction (FIM) 5 - Interacts appropriately with others 90% of time   Problem Solving   Problem solving (FIM) 2 - Solves basic problems 25-49% of time   Memory   Memory (FIM) 2 - Recognizes, recalls/performs 25-49%   Speech/Language/Cognition Assessmetn   Treatment Assessment Pt seen for skilled speech therapy session targeting cognitive linguistic communication skills. Pt alert and interactive- completed the following skilled therapy tasks. Engaged in basic problem solving pictures- pt was to determine what was going on in the picture or determine cause/effect relations as to what would cause this to happen. Pt was able to ID what was in each picture and benefitted from verbal cues then to use this to then determine what would come next or why they were doing this- overall moderate cuing provided given reduced thought organization and planning. Pt next completed recall task with general information of products. Given a name brand, pt was able to name the product associated with 28/40acc increasing with verbal and contextual cues. Pt is on track for discharge- pending medical stability. Pt overall is improving with skilled SLP services and will cont to benefit to maximize overall cognitive linguistic communication abilities at this time.   SLP Therapy Minutes   SLP Time In 1400   SLP Time Out 1430   SLP Total Time (minutes) 30   SLP Mode of treatment - Individual (minutes) 30   SLP Mode of treatment - Concurrent (minutes) 0   SLP Mode of  treatment - Group (minutes) 0   SLP Mode of treatment - Co-treat (minutes) 0   SLP Mode of Treatment - Total time(minutes) 30 minutes   SLP Cumulative Minutes 540   Therapy Time missed   Time missed? No

## 2025-05-21 NOTE — ASSESSMENT & PLAN NOTE
Hemoglobin A1C was 6.6 on 2/22/25  Sees End[facility] in [place]  Home:  Janumet XR  qd  Here: Lispro 6 U TID/Januvia 50mg qd  Continue DM diet  On QID Accuchecks with SSI Algo 1  BSs have continued to improve with steroid being tapered  = LD was 5/19 5/17 creat bumped up to 1.38 so Metformin was placed on hold  Creatinine was 1.20 again on 5/20.  If he stays stable, will restart Metformin. (Last contrast study was 5/18/25).    Will stop the Lispro WM today 5/21/25.

## 2025-05-21 NOTE — ASSESSMENT & PLAN NOTE
"Patient with development of worsening confusion, and was seen in the ED on 3/24/2025.  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  Lymphoma/leukemia panel was nondiagnostic  CTH 4/3/25: concerning for worsening hydrocephalus.   Repeat LP 4/7/25 = undiagnostic again   MRI brain 4/11/25: \"Interval enlargement of the dominant left anterior basal ganglionic mass lesion with greater surrounding vasogenic edema and mass effect. 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  S/p EVD, self removed 4/26  S/p Keppra 500mg BID x 7 days for postoperative seizure ppx   Started on chemotherapy during hospital stay and is for weekly Rituximab and Methotrexate every 2 weeks  Continue Dexamethasone taper = LD was this AM 5/19/25  Maintain PICC line  H-O following  Had Rituxan 5/10/25  Was due for Methotrexate on 5/17 but labs and VS were concerning for infection so sepsis workup was initiated. Now with recurrence of bacteremia.   ID cleared pt to get chemo now   H-O has ordered Methotrexate and renal ordered bicarb drip.    "

## 2025-05-21 NOTE — ASSESSMENT & PLAN NOTE
Blood culture urine culture positive for gram-negative rods-E. coli.  Urine culture was positive for E. coli was found to be tachycardic and hypotensive and had leukopenia.  Continue   antibiotic per infectious disease

## 2025-05-21 NOTE — PROGRESS NOTES
05/21/25 1050   Pain Assessment   Pain Assessment Tool 0-10   Pain Score No Pain   Restrictions/Precautions   Precautions Bed/chair alarms;Cognitive;Fall Risk;Limb alert;Multiple lines;Supervision on toilet/commode   Cognition   Overall Cognitive Status Impaired   Arousal/Participation Alert;Cooperative   Attention Attends with cues to redirect   Following Commands Follows one step commands without difficulty   Subjective   Subjective reports feeling occ dizziness ( this session - only slight when bending over to  cones )   Sit to Stand   Type of Assistance Needed Supervision   Physical Assistance Level No physical assistance   Comment no AD   Sit to Stand CARE Score 4   Bed-Chair Transfer   Type of Assistance Needed Incidental touching   Physical Assistance Level No physical assistance   Comment no AD   Chair/Bed-to-Chair Transfer CARE Score 4   Walk 10 Feet   Type of Assistance Needed Supervision   Physical Assistance Level No physical assistance   Comment no AD   Walk 10 Feet CARE Score 4   Walk 50 Feet with Two Turns   Type of Assistance Needed Supervision   Physical Assistance Level No physical assistance   Comment no AD   Walk 50 Feet with Two Turns CARE Score 4   Walk 150 Feet   Type of Assistance Needed Supervision   Physical Assistance Level No physical assistance   Comment no AD   Walk 150 Feet CARE Score 4   Ambulation   Primary Mode of Locomotion Prior to Admission Walk   Distance Walked (feet) 250 ft  (170',80',40')   Assist Device Other  (none)   Gait Pattern Step through;Slow Vicki   Limitations Noted In Balance   Provided Assistance with: Balance;Direction   Walk Assist Level Close Supervision   Does the patient walk? 2. Yes   Curb or Single Stair   Style negotiated Single stair   Type of Assistance Needed Supervision   Physical Assistance Level No physical assistance   Comment R rail   1 Step (Curb) CARE Score 4   4 Steps   Type of Assistance Needed Supervision   Physical Assistance  Level No physical assistance   Comment R rail   4 Steps CARE Score 4   12 Steps   Type of Assistance Needed Supervision   Physical Assistance Level No physical assistance   Comment R rail  (  steps 2* IV)   12 Steps CARE Score 4   Picking Up Object   Type of Assistance Needed Supervision   Physical Assistance Level No physical assistance   Comment no reacher  - no AUE support   Picking Up Object CARE Score 4   Toilet Transfer   Type of Assistance Needed Supervision   Physical Assistance Level No physical assistance   Comment pt stood to  void with no unsteadiness   Toilet Transfer CARE Score 4   Therapeutic Interventions   Strengthening repeated  STS while holding 4# ball - 10 reps but needed 2 rest breaks  - weak with  last 3 reps   Balance alternating step taps to  7 cones and  side stepping L and R to each cone  with  CG   Assessment   Treatment Assessment pt seen for  brief 30' session  wiht  focus on walking wiht no  AD , steps and  balance  ex . pt did well with no RW  today - tends to go step over  step  up steps then  step to  down  steps  - rec  step to for up as well for increase safety . WIfe present for session wiht no questions or concerns . occ slight  LOB wiht challenges - self recovered  - pt wiht flat affect but pleasant and cooperative with all tasks   PT Barriers   Physical Impairment Decreased strength;Decreased endurance;Impaired balance;Decreased cognition   Functional Limitation Car transfers;Stair negotiation;Standing;Transfers;Walking   Plan   Treatment/Interventions Functional transfer training;LE strengthening/ROM;Elevations;Therapeutic exercise;Endurance training;Cognitive reorientation;Bed mobility;Gait training   PT Therapy Minutes   PT Time In 1050   PT Time Out 1125   PT Total Time (minutes) 35   PT Mode of treatment - Individual (minutes) 35   PT Mode of treatment - Concurrent (minutes) 0   PT Mode of treatment - Group (minutes) 0   PT Mode of treatment - Co-treat (minutes) 0    PT Mode of Treatment - Total time(minutes) 35 minutes   PT Cumulative Minutes 831

## 2025-05-21 NOTE — PROGRESS NOTES
05/21/25 1230   Pain Assessment   Pain Assessment Tool 0-10   Pain Score No Pain   Restrictions/Precautions   Precautions Bed/chair alarms;Cognitive;Fall Risk;Limb alert;Multiple lines;Supervision on toilet/commode;Fluid restriction;1:1   Subjective   Subjective pt agreeable to perform skilled PT   Sit to Stand   Type of Assistance Needed Supervision   Sit to Stand CARE Score 4   Bed-Chair Transfer   Type of Assistance Needed Incidental touching   Comment no AD   Chair/Bed-to-Chair Transfer CARE Score 4   Transfer Bed/Chair/Wheelchair   Adaptive Equipment None;Roller Walker   Walk 10 Feet   Type of Assistance Needed Supervision;Adaptive equipment   Comment RW into his bathroom   Walk 10 Feet CARE Score 4   Walk 50 Feet with Two Turns   Type of Assistance Needed Supervision   Comment no AD   Walk 50 Feet with Two Turns CARE Score 4   Walk 150 Feet   Type of Assistance Needed Supervision   Comment No AD   Walk 150 Feet CARE Score 4   Ambulation   Primary Mode of Locomotion Prior to Admission Walk   Distance Walked (feet) 150 ft   Assist Device Hand Hold   Does the patient walk? 2. Yes   Wheel 50 Feet with Two Turns   Reason if not Attempted Activity not applicable   Wheel 50 Feet with Two Turns CARE Score 9   Wheel 150 Feet   Reason if not Attempted Activity not applicable   Wheel 150 Feet CARE Score 9   Therapeutic Interventions   Strengthening seated 4 # LAQ marching AP 3x10 reps and mini squats 10 reps   Flexibility LE manual stretch   Assessment   Treatment Assessment pt focus on ambulation w/o AD but pt has IV pole at this PM session d/t MD order for fluids .Pt also using RW to walk to his bathroom at S lvl but needed assists with IV pole .pt perform TE ex to increase his LE strengthening and short 30 min session and back to his room with all needs in reach and pt inhis recliner with alarm on and 1:1 in his room . Pt not being DC tomorrow d/t more testing   Barriers to Discharge Inaccessible home environment    Plan   Progress Progressing toward goals   PT Therapy Minutes   PT Time In 1230   PT Time Out 1300   PT Total Time (minutes) 30   PT Mode of treatment - Individual (minutes) 30   PT Mode of treatment - Concurrent (minutes) 0   PT Mode of treatment - Group (minutes) 0   PT Mode of treatment - Co-treat (minutes) 0   PT Mode of Treatment - Total time(minutes) 30 minutes   PT Cumulative Minutes 861   Therapy Time missed   Time missed? No

## 2025-05-21 NOTE — ASSESSMENT & PLAN NOTE
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

## 2025-05-21 NOTE — ASSESSMENT & PLAN NOTE
64 yoM with PMHx of DM2, NAFLD, and HT who presented with progressive encephalopathy found to have multiple scattered nodular cerebral and cerebellar enhancement who underwent two non-diagnostic LPs prompting stereotactic brain biopsy (4/14) which showed large B-cell lymphoma. See oncology progress note on 4/21 for full diagnostic work up. On 4/29, agitation led to infectious work up with showed UA+ and BCx with 2/2 E. Coli sensitive to ceftriaxone. ID was consulted is managing and has approved to restart chemo 5/2.      Treatment Plan: regimen modified from (https://pubmed.ncbi.nlm.nih.gov/97354275/)     High-dose methotrexate every 2 weeks x4 cycles followed by every 4 weeks maintenance (could consider dosing every 4 weeks if he discharges to outside rehab)  C1 (4/17) 8 g/m²  C2 (5/2) 3 g/m², dose-reduced due to LETY, delayed x1 day due to E. coli bacteremia.  C3 (was planned for 5/17) delayed due to E. coli bacteremia, started on 5/21.  Urine pH remained above 7.0.  Labs including CBC and CMP from this morning reviewed.  Discussed with nephrology and ID team, okay to proceed with chemotherapy today.  Will need to monitor methotrexate level 24 hours post administration.  Patient will be started on leucovorin rescue 24-hour post methotrexate.  Will continue to monitor urine pH and methotrexate level daily and continue leucovorin rescue until methotrexate levels are less than 0.10.  Patient had outpatient appointment with Dr. Phan on 5/23 that will be moved given delay in his treatment and discharge.  Continue to monitor CBC and CMP daily until methotrexate levels cleared.

## 2025-05-21 NOTE — PROGRESS NOTES
Progress Note - Nephrology   Name: Alexis Dennison 64 y.o. male I MRN: [id number]  Unit/Bed#: -01 I Date of Admission: 5/7/2025   Date of Service: 5/21/2025 I Hospital Day: 14    Assessment & Plan  LETY (acute kidney injury) (HCC)  Baseline creatinine 0.8 to 1.1.   Etiology suspected to be due to prerenal azotemia/possible early ATN  Peak SCr 2.77 on 4/22/25.   Renal function improved to creatinine 1.2 mg/dL and stable over last few days.  Bicarb level on the higher side at 29.  Continue current IV normal saline at 50 mL/h, clinically appears euvolemic.  Follow-up hematology oncology recommendations with plan to start on chemotherapy with methotrexate would switch to bicarb drip at 100 mL/h and monitor.  Avoid nephrotoxins    HTN (hypertension)  BP stable and is at goal  Not on BP meds.  Avoid hypotension    Primary central nervous system (CNS) lymphoma  Hematology is following.     Complicated by obstructive hydrocephalus, status post EVD on 4/13 through 4/19.    Status post rituximab, status post high-dose methotrexate and intrathecal cytarabine on 4/17.  Repeat dose of rituximab on 5/10  Needs high dose methotrexate -currently on hold given suspected sepsis.  Blood cultures positive for gram-negative rods and urine culture also positive for gram-negative rods.  Infectious disease following.  Currently on antibiotic per ID recommendations, was switched to oral Duricef on 5/20.   IF  planned to restart on chemotherapy with methotrexate, plan is to keep Goal urine pH 7.0 or above.   Continue to monitor volume status, if plan to start on methotrexate, will consider switching to bicarb drip.  For now continue IV Isolyte at current rate  Sepsis (HCC)  Blood culture urine culture positive for gram-negative rods-E. coli.  Urine culture was positive for E. coli was found to be tachycardic and hypotensive and had leukopenia.  Continue   antibiotic per infectious disease  Anemia  Status post PRBC, hemoglobin today 10.3   g/dL continue management per primary team    I have reviewed the nephrology recommendations including renal function stable and plan to continue current IV fluid, with primary team, and we are in agreement with renal plan including the information outlined above.     Subjective   Patient denies any complaints, no chest pain shortness of breath, no  nausea vomiting    Objective :  Temp:  [97.8 °F (36.6 °C)-98.3 °F (36.8 °C)] 97.8 °F (36.6 °C)  HR:  [69-77] 77  BP: (132-139)/(74-80) 132/74  Resp:  [16-18] 18  SpO2:  [98 %-100 %] 100 %  O2 Device: None (Room air)    Current Weight: Weight - Scale: 79.8 kg (175 lb 14.8 oz)  First Weight: Weight - Scale: 75.9 kg (167 lb 6.4 oz)  I/O         05/19 0701  05/20 0700 05/20 0701  05/21 0700 05/21 0701  05/22 0700    P.O. 540 1080 240    I.V. (mL/kg) 630 (7.9)      Blood       IV Piggyback 50      Total Intake(mL/kg) 1220 (15.3) 1080 (13.5) 240 (3)    Urine (mL/kg/hr) 900 (0.5)  300 (1)    Total Output 900  300    Net +320 +1080 -60           Unmeasured Urine Occurrence 2 x 1 x     Unmeasured Stool Occurrence  1 x           Physical Exam   General:  Ill looking, awake.  Eyes: Conjunctivae pink,  Sclera anicteric  ENT: lips and mucous membranes moist  Neck: supple   Chest: Clear to Auscultation both lungs,  no crackles, ronchus or wheezing.  CVS: S1 & S2 present, normal rate, regular rhythm, no murmur.  Abdomen: soft, non-tender, non-distended, Bowel sounds normoactive  Extremities: no edema of  legs  Skin: no rash  Neuro: awake, alert, oriented x 3   Psych: Mood and affect appropriate    Medications:  Current Medications[1]      Lab Results: I have reviewed the following results:  Results from last 7 days   Lab Units 05/21/25  1033 05/20/25  0541 05/19/25  0559 05/18/25  0856 05/18/25  0543 05/17/25  0941 05/17/25  0537 05/16/25  0618 05/15/25  0600   WBC Thousand/uL 9.02  --  3.93* 1.84* 1.91* 1.26*  --   --  3.35*   HEMOGLOBIN g/dL 10.3*  --  8.0* 6.7* 6.3* 8.5*  --   --   "9.0*   HEMATOCRIT % 32.0*  --  23.7* 20.1* 18.9* 24.1*  --   --  26.1*   PLATELETS Thousands/uL 144*  --  79* 66* 68* 85*  --   --  115*   POTASSIUM mmol/L  --  3.5 3.9  --  3.5  --  3.8 4.2 3.9   CHLORIDE mmol/L  --  106 107  --  105  --  92* 98 98   CO2 mmol/L  --  29 27  --  30  --  31 29 29   BUN mg/dL  --  19 19  --  20  --  25 27* 27*   CREATININE mg/dL  --  1.20 1.20  --  1.28  --  1.38* 1.19 1.25   CALCIUM mg/dL  --  8.3* 7.9*  --  7.4*  --  8.3* 8.8 8.5   ALBUMIN g/dL  --   --  2.8*  --  2.6*  --  3.4*  --   --        Administrative Statements     Portions of the record may have been created with voice recognition software. Occasional wrong word or \"sound a like\" substitutions may have occurred due to the inherent limitations of voice recognition software. Read the chart carefully and recognize, using context, where substitutions have occurred.If you have any questions, please contact the dictating provider.       [1]   Current Facility-Administered Medications:     acetaminophen (TYLENOL) tablet 650 mg, 650 mg, Oral, Q6H PRN, Crispin Arana MD, 650 mg at 05/16/25 0844    allopurinol (ZYLOPRIM) tablet 300 mg, 300 mg, Oral, Daily, Crispin Arana MD, 300 mg at 05/21/25 0818    alteplase (CATHFLO) injection 2 mg, 2 mg, Intracatheter, Q1MIN PRN, Crispni Arana MD    alteplase (CATHFLO) injection 2 mg, 2 mg, Intracatheter, Q1MIN PRN, Magali Lee MD    aluminum-magnesium hydroxide-simethicone (MAALOX) oral suspension 30 mL, 30 mL, Oral, Q4H PRN, Crispin Arana MD    atorvastatin (LIPITOR) tablet 20 mg, 20 mg, Oral, Daily, Crispin Arana MD, 20 mg at 05/21/25 0818    bisacodyl (DULCOLAX) rectal suppository 10 mg, 10 mg, Rectal, Daily PRN, Skyler Hendrickson MD    calcium carbonate (TUMS) chewable tablet 1,000 mg, 1,000 mg, Oral, Daily PRN, Crispin Arana MD    cefadroxil (DURICEF) capsule 1,000 mg, 1,000 mg, Oral, Q12H CAIT, Lisa Singh MD, 1,000 mg at 05/21/25 0825    chlorhexidine (PERIDEX) 0.12 % oral rinse " 15 mL, 15 mL, Mouth/Throat, Q12H [facility], Crispin rAana MD, 15 mL at 05/21/25 0818    docusate sodium (COLACE) capsule 100 mg, 100 mg, Oral, BID, Jessica Arreola DO, 100 mg at 05/21/25 0818    heparin (porcine) subcutaneous injection 5,000 Units, 5,000 Units, Subcutaneous, Q8H [facility], Crispin Arana MD, 5,000 Units at 05/21/25 0541    insulin lispro (HumALOG/ADMELOG) 100 units/mL subcutaneous injection 1-5 Units, 1-5 Units, Subcutaneous, TID AC, 1 Units at 05/19/25 1609 **AND** Fingerstick Glucose (POCT), , , TID AC, PATI Porras    insulin lispro (HumALOG/ADMELOG) 100 units/mL subcutaneous injection 1-5 Units, 1-5 Units, Subcutaneous, HS, PATI Porras, 1 Units at 05/18/25 2213    insulin lispro (HumALOG/ADMELOG) 100 units/mL subcutaneous injection 6 Units, 6 Units, Subcutaneous, TID With Meals, PATI Porras, 6 Units at 05/21/25 0822    lactulose (CHRONULAC) oral solution 20 g, 20 g, Oral, Daily PRN, Joshua Kaminski DO, 20 g at 05/14/25 1805    melatonin tablet 6 mg, 6 mg, Oral, HS, Crispin Arana MD, 6 mg at 05/20/25 2117    [Held by provider] metFORMIN (GLUCOPHAGE-XR) 24 hr tablet 1,000 mg, 1,000 mg, Oral, Daily, Crispin Arana MD, 1,000 mg at 05/17/25 0829    multi-electrolyte (Plasmalyte-A/Isolyte-S PH 7.4/Normosol-R) IV solution, 50 mL/hr, Intravenous, Continuous, Johnny Naik MD, Last Rate: 50 mL/hr at 05/21/25 0435, 50 mL/hr at 05/21/25 0435    OLANZapine (ZyPREXA) IM injection 5 mg, 5 mg, Intramuscular, BID PRN, Crispin Arana MD    ondansetron (ZOFRAN) injection 4 mg, 4 mg, Intravenous, Q6H PRN, Crispin Arana MD    oxyCODONE (ROXICODONE) split tablet 2.5 mg, 2.5 mg, Oral, Q4H PRN, 2.5 mg at 05/16/25 0844 **OR** oxyCODONE (ROXICODONE) IR tablet 5 mg, 5 mg, Oral, Q4H PRN, Crispin Arana MD, 5 mg at 05/18/25 0221    pantoprazole (PROTONIX) EC tablet 40 mg, 40 mg, Oral, Early Morning, Crispin Arana MD, 40 mg at 05/21/25 0541    polyethylene glycol (MIRALAX) packet  17 g, 17 g, Oral, Daily PRN, Skyler Hendrickson MD    QUEtiapine (SEROquel) tablet 12.5 mg, 12.5 mg, Oral, Daily, Crispin Arana MD, 12.5 mg at 05/20/25 1142    QUEtiapine (SEROquel) tablet 50 mg, 50 mg, Oral, HS, Crispin Arana MD, 50 mg at 05/20/25 2117    senna (SENOKOT) tablet 17.2 mg, 2 tablet, Oral, Daily With Lunch, Jessica Arreola DO, 17.2 mg at 05/20/25 1142    sitaGLIPtin (JANUVIA) tablet 50 mg, 50 mg, Oral, Daily, PATI Porras, 50 mg at 05/21/25 0818

## 2025-05-21 NOTE — PROGRESS NOTES
Progress Note - PMR   Name: Alexis Dennison 64 y.o. 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  > Started on high dose steroids and methotrexate every 2 weeks for 4 weeks (C1 received 4/18, C2 received 5/2) then maintenance every 4 weeks with Rituximab weekly for 8 weeks (1st dose Thursday 4/24 and second 5/3)  > Admitted to [facility] with ongoing cognitive deficits and delirium  > Completed Decadron taper on 5/19    - MTX + Rituximab restarted on 5/21 with bicarb drip, managed by Onc/Nephro  - Daily methotrexate levels when/if medication resumes  - Hem/Onc following  - NSGY following  - SLP for cognition  Type 2 diabetes mellitus with hyperglycemia, without long-term current use of insulin (HCC)  Lab Results   Component Value Date    HGBA1C 6.6 (H) 02/22/2025       Recent Labs     05/20/25  1615 05/20/25  2111 05/21/25  0552 05/21/25  1024   POCGLU 88 105 87 165*       Blood Sugar Average: Last 72 hrs:  (P) [id number]    - Metformin 1000mg daily ON HOLD - restart when Cr remains stable per IM  - Lantus 10u daily with Lispro 8u TID and SSI  - Monitor accuchecks while on steroids  - Management per IM  HTN, goal below 130/80  > Blood pressure controlled off antihypertensives    - Monitor VS  - Management per IM  Mixed hyperlipidemia  - Continue atorvastatin 10 mg daily  Thyroid nodule  Outpatient endocrine follow-up  needed  Pulmonary nodule  > CTA 3/29: nonspecific 4 mm RLL pulmonary nodule   - follow up outpatient for repeat imaging in 3 months  Liver lesion  > CTA 3/29- nonspecific 12mm hypodense right hepatic lesion, recommended MRI abdomen  > MRI abdomen 3/30- simple right hepatic lobe cyst    - Follow up outpatient for monitoring  Ambulatory dysfunction  - Fall precautions  - PT/OT  Encephalopathy  > Patient has been pleasantly confused for months in the setting of brain mass.  > acutely worsened due to UTI and bacteremia. S/p 7 day course of antibx  > Continues to lack insight to his current situation and is severely cognitively impaired.  He does not get agitated for long periods of time and is mostly confused and impulsive.  > A&O 1 on [facility] admission    - Continue seroquel 12.5mg at noon with 50mg HS  - PRN Zyprexa 2.5mg IM as needed for agitation  -Overstimulation precautions, frequent re-orientation, re-direction, re-assurance  -Sleep log and agitation monitoring if needed  -Optimize sleep-wake cycle  -Limit sedating medications when possible  - Ensure optimal management electrolytes, nutrition, and hydration  - Ensure optimal bowel/bladder management  - Ensure optimal pain management   -Patient/family/caregiver education and training   -Continue 1:1 due to overall safety, impulsivity, lack of safety awareness and poor insight.  -Fall precautions with frequent rounding; proactive toileting program, patient should not be unattended in bathroom      LETY (acute kidney injury) (HCC)  > Baseline Cr 0.9  > Recently 1.3 improved from before- peak 2.7  > Now s/p bicarb    - IVF per IM/Nephro  - Management at discretion of nephro   E. coli UTI  > Pet met sepsis criteria on 4/29 with confusion and agitation. UA was positive. Urine culture and blood cultures showed Ecoli sensitive to cephalosporins.  > ID consulted and pt completed 7d course of ceftriaxone  > Approved to restart chemo 5/2.  > Chemo on hold for GNR UTI and  bacteremia    - Monitor while on antibiotics  Impaired mobility and activities of daily living  Patient was evaluated by the rehabilitation team MD and an appropriate candidate for acute inpatient rehabilitation program at this time.  The patient will tolerate 3 hours/day 5 to 7 days/week of intensive physical, occupational in order to obtain goals for community discharge  Due to the patient's functional Compared to their baseline level of function in addition to their ongoing medical needs, the patient would benefit from daily supervision from a rehabilitation physician as well as rehabilitation nursing to implement and adjust the medical as well as functional plan of care in order to meet the patient's goals.  HTN (hypertension)  Had been on losartan but holding at this time- his blood pressures are good  Transaminitis  CMP checked on 5/9 and AST is down to 57 from 111 and ALT to 281 from 322  5/19 labs: AST 11 and ALT 25.  Hyponatremia  Sodium stable at 140 on 5/19 labs.  Thrombocytopenia (HCC)  5/19- Platelets down to 79K- will need to continue monitoring with chemotherapy now on hold  Sepsis (HCC)  5/19- Sepsis alert over the weekend.  CT A/P showed perinephric stranding. UA abnormal and Ucx grew GNR in urine. Blood cultures also + for GNR. Lactate 4.4>5.1. procal 7.39> 6.55. Started on IV antibiotics and Chemo on hold per Onc  - Continue antibiotics  - Monitor WBC on routine labs  - Follow temp curve  Recurrent E. coli bacteremia  5/20- Antibx changed from cefepime to ceftriaxone per ID recommendations. Need to follow up final susceptibilities and adjust antibiotics accordingly  Now on oral Abx, no longer on IV medications  Leukopenia  5/19- WBC improving. Now 3.93 from 1.84 off chemo  Anemia  5/19 - hemoglobin of 8.0 - improved from 6.7 after transfusion of 1u pRBC over the weekend  At risk for acid-base imbalance  Urine pH testing and sodium bicarb drip with MTX treatments (when resumed)  Electrolyte  imbalance risk    SIRS (systemic inflammatory response syndrome) (HCC)      Subjective   64-year-old male with history of hyperlipidemia hypertension, GERD, type 2 diabetes, NAFLD and sleep apnea presenting on 3/29 for increased confusion. Recently diagnosed with brain mass and had worsening of symptoms and had to come to the hospital and was found to have findings consistent with CNS lymphoma and is under treatment with methotrexate and Rituxan as well as steroids. Steroid course has overall completed and his course was complicated by LETY, delirium and agitation as well as an E. coli UTI with bacteremia status post treatment antibiotics     Chief Complaint: f/u ambulatory dysfunction    Interval: Seen and evaluated in room today without any acute issues overnight.  Is cleared to start his methotrexate infusion today and bicarb drip and oncology, medicine and nephrology involved.  Will need to determine when after the infusion he will be in therapy cleared for discharge.  Was initially for discharge tomorrow however will need to be held at least 1 to 2 days after the treatments per discussions previously in order to determine medical stability prior to discharge. Patient denies fever, chills, nausea, emesis, cough, shortness of breath, diarrhea, or constipation. Sleep was fine, mood stable. Pain is controlled.  Labs checked including a BMP which was within a normal limits and a CBC showing an increase in hemoglobin from 8 up to 10.3 as well as an increase in platelets from 79K to 144K.      Objective :  Temp:  [97.8 °F (36.6 °C)-98.3 °F (36.8 °C)] 97.8 °F (36.6 °C)  HR:  [69-77] 77  BP: (132-139)/(74-80) 132/74  Resp:  [16-18] 18  SpO2:  [98 %-100 %] 100 %  O2 Device: None (Room air)    Functional Update:  Physical Therapy Occupational Therapy Speech Therapy   Weight Bearing Status: Full Weight Bearing  Transfers: Incidental Touching  Bed Mobility: Supervision  Amulation Distance (ft): 150 feet  Ambulation: Minimal  Assistance, Incidental Touching  Assistive Device for Ambulation: Other (none)  Number of Stairs: 12  Assistive Device for Stairs: Right Hand Rail  Stair Assistance: Incidental Touching  Ramp: Incidental Touching  Assistive Device for Ramp: None  Discharge Recommendations: Home with:  DC Home with:: 24 Hour Assisteance, Family Support, Home Physical Therapy   Eating:  (setup)  Grooming: Supervision  Bathing: Supervision, Incidental Touching  Bathing: Supervision, Incidental Touching  Upper Body Dressing: Supervision  Lower Body Dressing: Supervision  Toileting: Supervision, Incidental Touching  Tub/Shower Transfer: Incidental Touching  Toilet Transfer: Incidental Touching  Cognition: Exceptions to WNL  Cognition: Decreased Memory, Decreased Comprehension, Decreased Safety, Decreased Executive Functions, Decreased Attention  Orientation: Person, Place   Mode of Communication: Verbal  Speech/Language:  (word finding difficulty)  Cognition: Exceptions to WNL  Cognition: Decreased Memory, Decreased Executive Functions, Decreased Attention, Decreased Comprehension, Decreased Safety, Impulsive  Orientation: Person  Discharge Recommendations: Home with:  DC Home with:: 24 Hour Supervision, 24 Hour Assisteance, Family Support, Home Speech Therapy, Outpatient Speech Therapy (pending pt progress)     Physical Exam  Vitals reviewed.   Constitutional:       Appearance: Normal appearance.   HENT:      Head: Normocephalic.      Comments: biopsy site healing well     Right Ear: External ear normal.      Left Ear: External ear normal.      Nose: Nose normal. No rhinorrhea.      Mouth/Throat:      Mouth: Mucous membranes are moist.      Pharynx: Oropharynx is clear.     Eyes:      General: No scleral icterus.      Cardiovascular:      Rate and Rhythm: Normal rate.   Pulmonary:      Effort: Pulmonary effort is normal. No respiratory distress.   Abdominal:      General: There is no distension.      Palpations: Abdomen is soft.      Musculoskeletal:      Right lower leg: No edema.      Left lower leg: No edema.     Skin:     General: Skin is warm and dry.     Neurological:      Mental Status: He is alert.      Motor: No weakness.      Comments: Oriented to self   Psychiatric:         Mood and Affect: Mood normal.         Behavior: Behavior normal.             Scheduled Meds:  Current Facility-Administered Medications   Medication Dose Route Frequency Provider Last Rate    acetaminophen  650 mg Oral Q6H PRN Crispin Arana MD      allopurinol  300 mg Oral Daily Crispin Arana MD      alteplase  2 mg Intracatheter Q1MIN PRN Crispin Arana MD      alteplase  2 mg Intracatheter Q1MIN PRN Magali Lee MD      aluminum-magnesium hydroxide-simethicone  30 mL Oral Q4H PRN Crispin Arana MD      atorvastatin  20 mg Oral Daily Crispin Arana MD      bisacodyl  10 mg Rectal Daily PRN Skyler Hendrickson MD      calcium carbonate  1,000 mg Oral Daily PRN Crispin Arana MD      cefadroxil  1,000 mg Oral Q12H [facility] Lisa Singh MD      chlorhexidine  15 mL Mouth/Throat Q12H CAIT Crispin Arana MD      docusate sodium  100 mg Oral BID Jessicakatherin Arreola, DO      heparin (porcine)  5,000 Units Subcutaneous Q8H [facility] Crispin Arana MD      insulin lispro  1-5 Units Subcutaneous TID AC PATI Porras      insulin lispro  1-5 Units Subcutaneous HS PATI Porras      lactulose  20 g Oral Daily PRN Joshua Kaminski DO      melatonin  6 mg Oral HS Crispin Arana MD      [Held by provider] metFORMIN  1,000 mg Oral Daily Crispin Arana MD      OLANZapine  5 mg Intramuscular BID PRN Crispin Arana MD      ondansetron  4 mg Intravenous Q6H PRN Crispin Arana MD      oxyCODONE  2.5 mg Oral Q4H PRN Crispin Arana MD      Or    oxyCODONE  5 mg Oral Q4H PRN Crispin Arana MD      pantoprazole  40 mg Oral Early Morning Crispin Arana MD      polyethylene glycol  17 g Oral Daily PRN Skylre Hendrickson MD      QUEtiapine  12.5 mg Oral Daily Crispin  MD Yasmeen      QUEtiapine  50 mg Oral HS Crispin Arana MD      senna  2 tablet Oral Daily With Lunch Jessica Arreola DO      sitaGLIPtin  50 mg Oral Daily PATI Porras      sodium bicarbonate 150 mEq in dextrose 5 % 1,000 mL infusion  100 mL/hr Intravenous Continuous Johnny Naik MD           Lab Results: I have reviewed the following results:  Results from last 7 days   Lab Units 05/21/25  1033 05/19/25  0559 05/18/25  0856   HEMOGLOBIN g/dL 10.3* 8.0* 6.7*   HEMATOCRIT % 32.0* 23.7* 20.1*   WBC Thousand/uL 9.02 3.93* 1.84*   PLATELETS Thousands/uL 144* 79* 66*     Results from last 7 days   Lab Units 05/21/25  1033 05/20/25  0541 05/19/25  0559 05/18/25  0543   BUN mg/dL 19 19 19 20   SODIUM mmol/L 142 141 140 140   POTASSIUM mmol/L 3.8 3.5 3.9 3.5   CHLORIDE mmol/L 105 106 107 105   CREATININE mg/dL 1.28 1.20 1.20 1.28   AST U/L 24  --  11* 13   ALT U/L 40  --  25 28                Joshua Kaminski DO  Physical Medicine and Rehabilitation  [facility]    I have spent a total time of 55 minutes in caring for this patient on the day of the visit/encounter including Counseling / Coordination of care, Documenting in the medical record, Reviewing/placing orders in the medical record (including tests, medications, and/or procedures), and Communicating with other healthcare professionals .

## 2025-05-21 NOTE — ASSESSMENT & PLAN NOTE
Hematology is following.     Complicated by obstructive hydrocephalus, status post EVD on 4/13 through 4/19.    Status post rituximab, status post high-dose methotrexate and intrathecal cytarabine on 4/17.  Repeat dose of rituximab on 5/10  Needs high dose methotrexate -currently on hold given suspected sepsis.  Blood cultures positive for gram-negative rods and urine culture also positive for gram-negative rods.  Infectious disease following.  Currently on antibiotic per ID recommendations, was switched to oral Duricef on 5/20.   IF  planned to restart on chemotherapy with methotrexate, plan is to keep Goal urine pH 7.0 or above.   Continue to monitor volume status, if plan to start on methotrexate, will consider switching to bicarb drip.  For now continue IV Isolyte at current rate

## 2025-05-21 NOTE — QUICK NOTE
Discussed with oncology, noted plan to start on methotrexate infusion today, reviewed labs from today.  Renal function stable at creatinine 1.2, changed IV fluid to bicarb drip at 100 mL/h

## 2025-05-21 NOTE — PROGRESS NOTES
"Progress Note - Hospitalist   Name: Alexis Dennison 64 y.o. male I MRN: [id number]  Unit/Bed#: -01 I Date of Admission: 5/7/2025   Date of Service: 5/21/2025 I Hospital Day: 14    Assessment & Plan  Primary central nervous system (CNS) lymphoma  Patient with development of worsening confusion, and was seen in the ED on 3/24/2025.  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  Lymphoma/leukemia panel was nondiagnostic  CTH 4/3/25: concerning for worsening hydrocephalus.   Repeat LP 4/7/25 = undiagnostic again   MRI brain 4/11/25: \"Interval enlargement of the dominant left anterior basal ganglionic mass lesion with greater surrounding vasogenic edema and mass effect. 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  S/p EVD, self removed 4/26  S/p Keppra 500mg BID x 7 days for postoperative seizure ppx   Started on chemotherapy during hospital stay and is for weekly Rituximab and Methotrexate every 2 weeks  Continue Dexamethasone taper = LD was this AM 5/19/25  Maintain PICC line  H-O following  Had Rituxan 5/10/25  Was due for Methotrexate on 5/17 but labs and VS were concerning for infection so sepsis workup was initiated. Now with recurrence of bacteremia.   ID cleared pt to get chemo now   H-O has ordered Methotrexate and renal ordered bicarb drip.    Type 2 diabetes mellitus with hyperglycemia, without long-term current use of insulin (HCC)  Hemoglobin A1C was 6.6 on " 2/22/25  Sees Endo [facility] in [place]  Home:  Janumet XR  qd  Here: Lispro 6 U TID/Januvia 50mg qd  Continue DM diet  On QID Accuchecks with SSI Algo 1  BSs have continued to improve with steroid being tapered  = LD was 5/19 5/17 creat bumped up to 1.38 so Metformin was placed on hold  Creatinine was 1.20 again on 5/20.  If he stays stable, will restart Metformin. (Last contrast study was 5/18/25).    Will stop the Lispro WM today 5/21/25.    Mixed hyperlipidemia  Continue atorvastatin  Did have slightly elevated LFT's on prior labs but most recently normal, consider holding statin if LFTs trend back up  Thyroid nodule  TSH/free T4 4/21/25 = 0.019/1.13  Repeat TSH/free T4 done 5/12 = TSH was 0.287 with free T4 0.85  TSH has normalized today 5/21.  D/W [facility] -->  plan for OP US  Pulmonary nodule  CT chest 3 months follow-up  Liver lesion  Patient will require outpatient follow-up  Encephalopathy  Continue Seroquel  S/p steroid taper- last dose was 5/19/25  On 1:1  LETY (acute kidney injury) (HCC)  Monitor status post methotrexate  Peak creat was 2.77 on 4/22/25  Previous baseline as OP 5/2023-2/22/25 was 1.1 to 1.0  CMP 5/9/25 with creatinine of 1.18 down from 1.23 on 5/8 and on 5/10 was 1.15  CMP on 5/12/25 showed creatinine stable at 1.1  5/17: creat up again to 1.38 = Being followed by Nephrology. Met sepsis criteria 5/17 and placed on IVF ordered as per sepsis protocol  5/19: creat trending down and was 1.20 -->  IVF was  reduced to 50ml/hr per renal.  On 5/20/25, they want to continue same IVF  Creatinine today 5/21 is 1.28  Sepsis (HCC)  Patient noted to have hypotension and tachycardia on 5/17  Sepsis workup ordered and found to have positive blood cultures again, growing E coli as before  ID reconsulted  Started Cefepime 5/17/25 = await blood urine cx results but preliminary results of urine >100,000 GNR and prelim blood cx = GNR with blood cx ID panel positive for  "E.coli  Continue IVF = renal managing  He has had stable BP, tachycardia has resolved and he has had no fevers  Updated plan from ID as below  Recurrent E. coli bacteremia  Patient completed 7 days of IV cefazolin which was finished on May 5  CT of A/P was done 5/18 per recommendation from ID looking for possible reason for recurrent bacteremia = was unrevealing  Was on Cetriaxone --> ID changed to Duricef to continue through 5/30 for a 14 days total of antibiotic tx  HTN (hypertension)  Home:  Losartan -25 qd  Here:  no meds   Stable BP today 5/21/25  Transaminitis  AST is 111, previously 114,  ALT is 322 previously 149  D/w H-O, they are aware =  \"Could be due to recent chemotherapy versus antibiotics versus liver lesions\".    CMP 5/9/25 = AST 57 (previously 111),  (previously 322)  Resolved  Leukopenia  WBC was stable at 5.9 on 5/12/25 but 5/15/25 is down to 1.84  H-O following  Sepsis workup ongoing but ID felt likely 2/2 Rituxin  Hyponatremia  resolved  Thrombocytopenia (HCC)  Per H-O  Platelets dropped to 66 on 5/18 and on 5/19 was up to 79 --> today 5/21 up to 144  For platelet transfusion if counts drop below 20K or in setting of any spontaneous bleeding   Anemia  Hgb dropped to 6.3 on 5/18 and was confirmed by repeat lab  at 6.7 so was transfused with one unit of PRBCs after discussion with Oncology  Spoke with wife over the phone and consent obtained  On 5/19/25, hemoglobin was up to 8.0 = appropriate response to transfusion  Today 5/21/25, hemoglobin is 10.3    The above assessment and plan was reviewed and updated as determined by my evaluation of the patient on 5/21/2025.    History of Present Illness   Patient seen and examined. Patients overnight issues or events were reviewed with nursing staff. New or overnight issues include the following:   No new or overnight issues.  Offers no complaints    Review of Systems    Objective :  Temp:  [97.8 °F (36.6 °C)-98.3 °F (36.8 °C)] 97.8 °F (36.6 " °C)  HR:  [69-77] 77  BP: (132-139)/(74-80) 132/74  Resp:  [16-18] 18  SpO2:  [98 %-100 %] 100 %  O2 Device: None (Room air)    Invasive Devices       Peripherally Inserted Central Catheter Line  Duration             PICC Line 05/01/25 Left Brachial 19 days                    Physical Exam  General Appearance: no distress, non toxic appearing  HEENT: PERRLA, conjuctiva normal; oropharynx clear; mucous membranes moist   Neck:  Supple, normal ROM  Lungs: CTA, normal respiratory effort, no retractions, expiratory effort normal  CV: regular rate and rhythm; no rubs/murmurs/gallops, PMI normal   ABD: soft; ND/NT; +BS  EXT: no edema  Skin: normal turgor, normal texture  Psych: affect normal, mood normal  Neuro: AA        The above physical exam was reviewed and updated as determined by my evaluation of the patient on 5/21/2025.      Lab Results: I have reviewed the following results:  Results from last 7 days   Lab Units 05/21/25  1033 05/19/25  0559   WBC Thousand/uL 9.02 3.93*   HEMOGLOBIN g/dL 10.3* 8.0*   HEMATOCRIT % 32.0* 23.7*   PLATELETS Thousands/uL 144* 79*     Results from last 7 days   Lab Units 05/20/25  0541 05/19/25  0559   SODIUM mmol/L 141 140   POTASSIUM mmol/L 3.5 3.9   CHLORIDE mmol/L 106 107   CO2 mmol/L 29 27   BUN mg/dL 19 19   CREATININE mg/dL 1.20 1.20   CALCIUM mg/dL 8.3* 7.9*             Results from last 7 days   Lab Units 05/21/25  1024 05/21/25  0552 05/20/25  2111   POC GLUCOSE mg/dl 165* 87 105       Imaging Results Review: No pertinent imaging studies reviewed.  Other Study Results Review: No additional pertinent studies reviewed.    Review of Scheduled Meds: Medications  reviewed and reconciled as needed  Current Facility-Administered Medications   Medication Dose Route Frequency Provider Last Rate    acetaminophen  650 mg Oral Q6H PRN Crispin Arana MD      allopurinol  300 mg Oral Daily Crispin Arana MD      alteplase  2 mg Intracatheter Q1MIN PRN Crispin Arana MD      alteplase  2  mg Intracatheter Q1MIN PRN Magali Lee MD      aluminum-magnesium hydroxide-simethicone  30 mL Oral Q4H PRN Crispin Arana MD      atorvastatin  20 mg Oral Daily Crispin Arana MD      bisacodyl  10 mg Rectal Daily PRN Skyler Hendrickson MD      calcium carbonate  1,000 mg Oral Daily PRN Crispin Arana MD      cefadroxil  1,000 mg Oral Q12H [facility] Lisa Singh MD      chlorhexidine  15 mL Mouth/Throat Q12H [facility] Crispin Arana MD      docusate sodium  100 mg Oral BID Jessica Arreola,       heparin (porcine)  5,000 Units Subcutaneous Q8H [facility] Crispin Arana MD      insulin lispro  1-5 Units Subcutaneous TID AC PATI Porras      insulin lispro  1-5 Units Subcutaneous HS PATI Porras      insulin lispro  6 Units Subcutaneous TID With Meals PATI Porras      lactulose  20 g Oral Daily PRN Joshua Kaminski,       melatonin  6 mg Oral HS Crispin Arana MD      [Held by provider] metFORMIN  1,000 mg Oral Daily Crispin Arana MD      multi-electrolyte  50 mL/hr Intravenous Continuous Johnny Naik MD 50 mL/hr (05/21/25 8628)    OLANZapine  5 mg Intramuscular BID PRN Crispin Arana MD      ondansetron  4 mg Intravenous Q6H PRN Crispin Arana MD      oxyCODONE  2.5 mg Oral Q4H PRN Crispin Arana MD      Or    oxyCODONE  5 mg Oral Q4H PRN Crispin Arana MD      pantoprazole  40 mg Oral Early Morning Crispin Arana MD      polyethylene glycol  17 g Oral Daily PRN Skyler Hendrickson MD      QUEtiapine  12.5 mg Oral Daily Crispin Arana MD      QUEtiapine  50 mg Oral HS Crispin Arana MD      senna  2 tablet Oral Daily With Lunch Jessica Arreola,       sitaGLIPtin  50 mg Oral Daily PATI Porras         VTE Pharmacologic Prophylaxis: HSQ  Code Status: Level 1 - Full Code  Current Length of Stay: 14 day(s)    Administrative Statements     ** Please Note:  voice to text software may have been used in the creation of this document. Although proof errors in transcription or  interpretation are a potential of such software**

## 2025-05-21 NOTE — ASSESSMENT & PLAN NOTE
Lab Results   Component Value Date    HGBA1C 6.6 (H) 02/22/2025       Recent Labs     05/20/25  1615 05/20/25  2111 05/21/25  0552 05/21/25  1024   POCGLU 88 105 87 165*       Blood Sugar Average: Last 72 hrs:  (P) [id number]    - Metformin 1000mg daily ON HOLD - restart when Cr remains stable per IM  - Lantus 10u daily with Lispro 8u TID and SSI  - Monitor accuchecks while on steroids  - Management per IM

## 2025-05-21 NOTE — ASSESSMENT & PLAN NOTE
Patient noted with transaminitis, LFTs has been fluctuating,   Could be due to recent chemotherapy versus antibiotics versus liver lesions.  CMP from this morning within normal limit.

## 2025-05-21 NOTE — ASSESSMENT & PLAN NOTE
5/20- Antibx changed from cefepime to ceftriaxone per ID recommendations. Need to follow up final susceptibilities and adjust antibiotics accordingly  Now on oral Abx, no longer on IV medications

## 2025-05-21 NOTE — ASSESSMENT & PLAN NOTE
Per H-O  Platelets dropped to 66 on 5/18 and on 5/19 was up to 79 --> today 5/21 up to 144  For platelet transfusion if counts drop below 20K or in setting of any spontaneous bleeding

## 2025-05-21 NOTE — PROGRESS NOTES
"OT daily tx note     05/21/25 0865   Pain Assessment   Pain Assessment Tool 0-10   Pain Score No Pain   Restrictions/Precautions   Precautions Bed/chair alarms;Cognitive;Fall Risk;Multiple lines;Pain;Supervision on toilet/commode   Weight Bearing Restrictions No   ROM Restrictions No   Lifestyle   Autonomy \"I slept pretty good but I'm always so tired\"   Eating   Type of Assistance Needed Set-up / clean-up   Physical Assistance Level No physical assistance   Eating CARE Score 5   Oral Hygiene   Type of Assistance Needed Supervision   Physical Assistance Level No physical assistance   Comment CS in stance at sink   Oral Hygiene CARE Score 4   Shower/Bathe Self   Type of Assistance Needed Incidental touching   Physical Assistance Level No physical assistance   Comment CG/CS for shower/SB; completed SB during session due to IV line and able to wash 10/10 body parts w/ VC for initiation and sequencing   Shower/Bathe Self CARE Score 4   Upper Body Dressing   Type of Assistance Needed Supervision   Physical Assistance Level No physical assistance   Comment seated w/ VC   Upper Body Dressing CARE Score 4   Lower Body Dressing   Type of Assistance Needed Incidental touching   Physical Assistance Level No physical assistance   Comment CG/CS in stance for cm; s/u for threading   Lower Body Dressing CARE Score 4   Putting On/Taking Off Footwear   Type of Assistance Needed Supervision   Physical Assistance Level No physical assistance   Comment seated utilizing crossed leg tech   Putting On/Taking Off Footwear CARE Score 4   Sit to Lying   Type of Assistance Needed Supervision   Physical Assistance Level No physical assistance   Sit to Lying CARE Score 4   Lying to Sitting on Side of Bed   Type of Assistance Needed Supervision   Physical Assistance Level No physical assistance   Comment no bed rails or HOB elevated   Lying to Sitting on Side of Bed CARE Score 4   Sit to Stand   Type of Assistance Needed Supervision   Physical " "Assistance Level No physical assistance   Comment CS w/o AD   Sit to Stand CARE Score 4   Bed-Chair Transfer   Type of Assistance Needed Incidental touching   Physical Assistance Level No physical assistance   Comment no AD   Chair/Bed-to-Chair Transfer CARE Score 4   Toileting Hygiene   Type of Assistance Needed Supervision   Physical Assistance Level No physical assistance   Comment cm/hygiene   Toileting Hygiene CARE Score 4   Toilet Transfer   Type of Assistance Needed Incidental touching   Physical Assistance Level No physical assistance   Comment CG/CS to toilet w/o BSC   Toilet Transfer CARE Score 4   Functional Standing Tolerance   Comments Pt participated in standing activity using word activity. Instructed pt to use random letters to make words and able to spontaneously spell \"bear\" and VC needed for A w/ additional words \"zebra, [place], bottom, [place].\" Pt completed standing at tabletop and able to tolerate ~9 min of standing w/ G functional reach/balance. No LOB/SOB noted. Pt utilized seated rest break x1 due to reports of \"weak legs.\" Pt making G progress w/ unsupported balance needed for tasks such as cm/showering. Pt educated on sig of activity and energy conservation tech. Pt benefits from activity to improve endurance, standing tolerance, balance, working memory, functional reach, and activity tolerance to faciltitate ind w/ ADLs.   Exercise Tools   Other Exercise Tool 1 Pt engaged in UB strengthening using 9# dawit for 10 x 3 reps with Good tolerance. Pt completed forward press and semicircular motion. Pt educated on energy conservation techniques and utilized rest breaks as needed. Pt benefits from activity to improve UB strengthening, activity tolerance, and endurance to facilitate independence w/ ADL/IADLs.   Cognition   Overall Cognitive Status Impaired   Arousal/Participation Alert;Cooperative   Attention Attends with cues to redirect   Orientation Level Oriented to person;Disoriented to " place;Disoriented to time;Disoriented to situation   Memory Decreased recall of recent events;Decreased short term memory;Decreased recall of precautions;Decreased long term memory;Decreased recall of biographical information   Following Commands Follows one step commands with increased time or repetition   Activity Tolerance   Activity Tolerance Patient limited by fatigue;Patient tolerated treatment well   Assessment   Treatment Assessment Pt engaged in skilled OT session with focus on ADL Retraining , LB Dressing, UB dressing, Functional Transfers, Functional Cognition, Functional Attention, Standing tolerance, Standing balance , Gross motor strengthening , Energy conservation training/education, healthy coping education, and Leisure and social pursuits. Pt is limited by weakness, impaired balance, decreased endurance, increased fall risk, decreased IADLS, decreased activity tolerance, decreased safety awareness, impaired judgement, and decreased cognition. SB completed during session w/ CG/CS level and will DC home at that level. Pt participate in tasks to focus on standing tolerance, UB strengthening, and functional cog and would benefit from cont repetition. OT services are warranted to address above barriers.   Prognosis Fair   Problem List Decreased strength;Decreased endurance;Impaired balance;Decreased mobility;Decreased coordination;Decreased cognition;Impaired judgement;Decreased safety awareness   Barriers to Discharge Inaccessible home environment   Plan   Treatment/Interventions ADL retraining;Functional transfer training;Therapeutic exercise;Endurance training   Progress Progressing toward goals   OT Therapy Minutes   OT Time In 0830   OT Time Out 1000   OT Total Time (minutes) 90   OT Mode of treatment - Individual (minutes) 90   OT Mode of treatment - Concurrent (minutes) 0   OT Mode of treatment - Group (minutes) 0   OT Mode of treatment - Co-treat (minutes) 0   OT Mode of Treatment - Total  time(minutes) 90 minutes   OT Cumulative Minutes 955   Therapy Time missed   Time missed? No

## 2025-05-21 NOTE — ASSESSMENT & PLAN NOTE
Likely due to recent chemo as well as sepsis, bacteremia.  Status post G-CSF 5/17, 5/18.  Improved.    Antibiotic plan as above  Continue to monitor CBC

## 2025-05-21 NOTE — ASSESSMENT & PLAN NOTE
TSH/free T4 4/21/25 = 0.019/1.13  Repeat TSH/free T4 done 5/12 = TSH was 0.287 with free T4 0.85  TSH has normalized today 5/21.  D/W Endo = [facility] -->  plan for OP US

## 2025-05-21 NOTE — PROGRESS NOTES
Progress Note - Infectious Disease   Name: Alexis Dennison 64 y.o. male I MRN: [id number]  Unit/Bed#: -01 I Date of Admission: 5/7/2025   Date of Service: 5/21/2025 I Hospital Day: 14    Assessment & Plan  Sepsis (HCC)  HR, leukopenia.  Due to bacteremia as below.  No other appreciable source.  LFTs, CT A/P otherwise unremarkable.  Improving with antibiotics.    Continue antibiotics as below  Follow temperatures closely  Supportive care as per the primary service  Recurrent E. coli bacteremia  Recent positive blood culture on 4/29 due to UTI versus PICC, status post 7 days of IV antibiotic thru 5/5.  Now with recurrence due to UTI given positive urine culture, increased perinephric stranding seen on CT.  Consider PICC infection, less likely.    Continue Duricef through 5/30 for 14 days total antibiotics  OK to keep PICC  Leukopenia  Likely due to recent chemo as well as sepsis, bacteremia.  Status post G-CSF 5/17, 5/18.  Improved.    Antibiotic plan as above  Continue to monitor CBC  Primary central nervous system (CNS) lymphoma  Diagnosed by LP, brain biopsy.  Complicated by obstructive hydrocephalus, status post EVD 4/13 through 4/19.  Status post Rituxan, high-dose methotrexate and intrathecal cytarabine on 4/17.  Received repeat dose of Rituxan on 5/10.    Low risk from ID perspective for chemo given clinical improvement, plan to continue antibiotics for longer course  Type 2 diabetes mellitus with hyperglycemia, without long-term current use of insulin ([facility])  Lab Results   Component Value Date    HGBA1C 6.6 (H) 02/22/2025   Relatively well-controlled, though remains a risk factor for infection.    I have discussed with AUGUST Alfaro with Dr. Myra BANSAL the above plan to consider chemotherapy.. The agrees with the plan.    Antibiotics:  Duricef #2  Antibiotics #5    Subjective   Patient has no fever, chills, sweats; no nausea, vomiting, diarrhea; no cough, shortness of breath; no pain. No new symptoms.  No trouble  passing urine.    Objective :  Temp:  [97.8 °F (36.6 °C)-98.3 °F (36.8 °C)] 97.8 °F (36.6 °C)  HR:  [69-77] 77  BP: (132-139)/(74-80) 132/74  Resp:  [16-18] 18  SpO2:  [98 %-100 %] 100 %  O2 Device: None (Room air)    General:  No acute distress  Psychiatric:  Awake and alert  Pulmonary:  Normal respiratory excursion without accessory muscle use  Abdomen:  Soft, nontender  Extremities:  No edema  Skin:  No rashes      Lab Results: I have reviewed the following results:  Results from last 7 days   Lab Units 05/21/25  1033 05/19/25  0559 05/18/25  0856   WBC Thousand/uL 9.02 3.93* 1.84*   HEMOGLOBIN g/dL 10.3* 8.0* 6.7*   PLATELETS Thousands/uL 144* 79* 66*     Results from last 7 days   Lab Units 05/21/25  1033 05/20/25  0541 05/19/25  0559 05/18/25  0543   SODIUM mmol/L 142 141 140 140   POTASSIUM mmol/L 3.8 3.5 3.9 3.5   CHLORIDE mmol/L 105 106 107 105   CO2 mmol/L 27 29 27 30   BUN mg/dL 19 19 19 20   CREATININE mg/dL 1.28 1.20 1.20 1.28   EGFR ml/min/1.73sq m 58 63 63 58   CALCIUM mg/dL 9.1 8.3* 7.9* 7.4*   AST U/L 24  --  11* 13   ALT U/L 40  --  25 28   ALK PHOS U/L 70  --  55 67   ALBUMIN g/dL 3.5  --  2.8* 2.6*     Results from last 7 days   Lab Units 05/17/25  1410 05/17/25  1217   BLOOD CULTURE   --  Escherichia coli*  Escherichia coli*   GRAM STAIN RESULT   --  Gram negative rods*  Gram negative rods*   URINE CULTURE  >100,000 cfu/ml Escherichia coli*  --      Results from last 7 days   Lab Units 05/18/25  0543 05/17/25  1217   PROCALCITONIN ng/ml 6.55* 7.39*

## 2025-05-21 NOTE — PLAN OF CARE
Problem: SAFETY ADULT  Goal: Patient will remain free of falls  Description: INTERVENTIONS:- Educate patient/family on patient safety including physical limitations- Instruct patient to call for assistance with activity - Consult OT/PT to assist with strengthening/mobility - Keep Call bell within reach- Keep bed low and locked with side rails adjusted as appropriate- Keep care items and personal belongings within reach- Initiate and maintain comfort rounds- Make Fall Risk Sign visible to staff- Offer Toileting every 2 Hours, in advance of need- Initiate/Maintain bed alarm- Obtain necessary fall risk management equipment:  - Apply yellow socks and bracelet for high fall risk patients- Consider moving patient to room near nurses station  5/20/2025 2012 by Yo Manning RN  Outcome: Progressing  5/20/2025 2012 by Yo Manning RN  Outcome: Progressing  Goal: Maintain or return to baseline ADL function  Description: INTERVENTIONS:-  Assess patient's ability to carry out ADLs; assess patient's baseline for ADL function and identify physical deficits which impact ability to perform ADLs (bathing, care of mouth/teeth, toileting, grooming, dressing, etc.)- Assess/evaluate cause of self-care deficits - Assess range of motion- Assess patient's mobility; develop plan if impaired- Assess patient's need for assistive devices and provide as appropriate- Encourage maximum independence but intervene and supervise when necessary- Involve family in performance of ADLs- Assess for home care needs following discharge - Consider OT consult to assist with ADL evaluation and planning for discharge- Provide patient education as appropriate  5/20/2025 2012 by Yo Manning RN  Outcome: Progressing  5/20/2025 2012 by Yo Manning RN  Outcome: Progressing  Goal: Maintains/Returns to pre admission functional level  Description: INTERVENTIONS:- Perform AM-PAC 6 Click Basic Mobility/ Daily Activity assessment daily.- Set  and communicate daily mobility goal to care team and patient/family/caregiver. - Collaborate with rehabilitation services on mobility goals if consulted- Perform Range of Motion 3 times a day.- Reposition patient every 2 hours.- Dangle patient 3 times a day- Stand patient 3 times a day- Ambulate patient 3 times a day- Out of bed to chair for meals  5/20/2025 2012 by Yo Manning RN  Outcome: Progressing  5/20/2025 2012 by Yo Manning RN  Outcome: Progressing

## 2025-05-21 NOTE — CASE MANAGEMENT
Referral made to [facility] for pt ot and speech services on dc. Awaiting determination.

## 2025-05-21 NOTE — PROGRESS NOTES
Progress Note - Oncology-Medical   Name: Alexis Dennison 64 y.o. male I MRN: [id number]  Unit/Bed#: -01 I Date of Admission: 5/7/2025   Date of Service: 5/21/2025 I Hospital Day: 14     Assessment & Plan  Primary central nervous system (CNS) lymphoma  64 yoM with PMHx of DM2, NAFLD, and HT who presented with progressive encephalopathy found to have multiple scattered nodular cerebral and cerebellar enhancement who underwent two non-diagnostic LPs prompting stereotactic brain biopsy (4/14) which showed large B-cell lymphoma. See oncology progress note on 4/21 for full diagnostic work up. On 4/29, agitation led to infectious work up with showed UA+ and BCx with 2/2 E. Coli sensitive to ceftriaxone. ID was consulted is managing and has approved to restart chemo 5/2.      Treatment Plan: regimen modified from (https://pubmed.ncbi.nlm.nih.gov/57858745/)     High-dose methotrexate every 2 weeks x4 cycles followed by every 4 weeks maintenance (could consider dosing every 4 weeks if he discharges to outside rehab)  C1 (4/17) 8 g/m²  C2 (5/2) 3 g/m², dose-reduced due to LETY, delayed x1 day due to E. coli bacteremia.  C3 (was planned for 5/17) delayed due to E. coli bacteremia, started on 5/21.  Urine pH remained above 7.0.  Labs including CBC and CMP from this morning reviewed.  Discussed with nephrology and ID team, okay to proceed with chemotherapy today.  Will need to monitor methotrexate level 24 hours post administration.  Patient will be started on leucovorin rescue 24-hour post methotrexate.  Will continue to monitor urine pH and methotrexate level daily and continue leucovorin rescue until methotrexate levels are less than 0.10.  Patient had outpatient appointment with Dr. Phan on 5/23 that will be moved given delay in his treatment and discharge.  Continue to monitor CBC and CMP daily until methotrexate levels cleared.  Thyroid nodule  Patient incidentally found to have thyroid nodule, was recommended  ultrasound thyroid.  Pulmonary nodule  CTA from 3/29/2025 with nonspecific 4 mm right lower lobe pulmonary nodule, recommended 3 months follow-up.  Transaminitis  Patient noted with transaminitis, LFTs has been fluctuating,   Could be due to recent chemotherapy versus antibiotics versus liver lesions.  CMP from this morning within normal limit.  Thrombocytopenia (HCC)  Patient does have thrombocytopenia at baseline but usually his platelet counts have been above 100 K,  This morning noted with worsening thrombocytopenia, suspected underlying infectious etiology.  Continue to monitor CBC daily, will need platelet transfusion if counts drop below 20K or in setting of any spontaneous bleeding.  Anemia  Likely multifactorial, hemoglobin has been in range of 9.  Will continue to monitor.      Outpatient follow up plan: Outpatient appointment rescheduled for 6/3/2025.    Communication with patient/family:  I did update the patient, spoke to patient's daughter Elizabeth over phone and updated plan for chemotherapy.  Communication with team:  Did communicate with  primary team. I also spoke with pharmacy regarding methotrexate      I did review this patient with Dr. Kebede and they are in agreement with this plan.          Subjective:  Patient seen at bedside, reports feeling better this morning.  No longer complains of foul-smelling urine.  Discussed with physical therapy, has been cooperating well.    Review of Systems   Constitutional:  Negative for appetite change, fatigue and unexpected weight change.   Respiratory:  Negative for chest tightness and shortness of breath.    Cardiovascular:  Negative for chest pain and palpitations.   Gastrointestinal:  Negative for abdominal pain, constipation and vomiting.   Genitourinary:  Negative for enuresis and frequency.   Musculoskeletal:  Negative for arthralgias and joint swelling.   Skin:  Negative for color change and rash.   Neurological:  Negative for dizziness and facial  asymmetry.   All other systems reviewed and are negative.         Objective:     Medication Administration - last 24 hours from 05/20/2025 1416 to 05/21/2025 1416         Date/Time Order Dose Route Action Action by     05/21/2025 0818 EDT atorvastatin (LIPITOR) tablet 20 mg 20 mg Oral Given Linda Hosler, RN     05/21/2025 0818 EDT chlorhexidine (PERIDEX) 0.12 % oral rinse 15 mL 15 mL Mouth/Throat Given Linda Hosler, RN     05/20/2025 2121 EDT chlorhexidine (PERIDEX) 0.12 % oral rinse 15 mL 15 mL Mouth/Throat Not Given Yo Manning RN     05/21/2025 0541 EDT pantoprazole (PROTONIX) EC tablet 40 mg 40 mg Oral Given Yo Manning RN     05/21/2025 0818 EDT allopurinol (ZYLOPRIM) tablet 300 mg 300 mg Oral Given Linda Hosler, RN     05/21/2025 1336 EDT heparin (porcine) subcutaneous injection 5,000 Units 5,000 Units Subcutaneous Given Michelle Cm     05/21/2025 0541 EDT heparin (porcine) subcutaneous injection 5,000 Units 5,000 Units Subcutaneous Given Yo Manning RN     05/20/2025 2117 EDT heparin (porcine) subcutaneous injection 5,000 Units 5,000 Units Subcutaneous Given Yo Manning RN     05/20/2025 1452 EDT heparin (porcine) subcutaneous injection 5,000 Units 5,000 Units Subcutaneous Given Linda Hosler, RN     05/20/2025 2117 EDT melatonin tablet 6 mg 6 mg Oral Given Yo Manning RN     05/21/2025 1145 EDT QUEtiapine (SEROquel) tablet 12.5 mg 12.5 mg Oral Given Michelle Cm     05/20/2025 2117 EDT QUEtiapine (SEROquel) tablet 50 mg 50 mg Oral Given Yo Manning RN     05/25/2025 0900 EDT metFORMIN (GLUCOPHAGE-XR) 24 hr tablet 1,000 mg -- Oral Held Dose (none)     05/24/2025 0900 EDT metFORMIN (GLUCOPHAGE-XR) 24 hr tablet 1,000 mg -- Oral Held Dose (none)     05/23/2025 0900 EDT metFORMIN (GLUCOPHAGE-XR) 24 hr tablet 1,000 mg -- Oral Held Dose (none)     05/22/2025 0900 EDT metFORMIN (GLUCOPHAGE-XR) 24 hr tablet 1,000 mg -- Oral Held Dose Jesenia Buenrostro PA-C      05/21/2025 0900 EDT metFORMIN (GLUCOPHAGE-XR) 24 hr tablet 1,000 mg -- Oral Held Dose Jesenia Buenrostro PA-C     05/21/2025 0818 EDT docusate sodium (COLACE) capsule 100 mg 100 mg Oral Given Linda Hosler, RN     05/20/2025 1800 EDT docusate sodium (COLACE) capsule 100 mg -- Oral Canceled Entry Linda Hosler, RN     05/20/2025 1654 EDT docusate sodium (COLACE) capsule 100 mg 100 mg Oral Given Linda Hosler, RN     05/21/2025 1145 EDT senna (SENOKOT) tablet 17.2 mg 17.2 mg Oral Given Michelle Cm     05/21/2025 0822 EDT insulin lispro (HumALOG/ADMELOG) 100 units/mL subcutaneous injection 6 Units 6 Units Subcutaneous Given Linda Hosler, RN     05/20/2025 1653 EDT insulin lispro (HumALOG/ADMELOG) 100 units/mL subcutaneous injection 6 Units 6 Units Subcutaneous Given Linda Hosler, RN     05/21/2025 0818 EDT sitaGLIPtin (JANUVIA) tablet 50 mg 50 mg Oral Given Linda Hosler, RN     05/21/2025 1149 EDT insulin lispro (HumALOG/ADMELOG) 100 units/mL subcutaneous injection 1-5 Units 1 Units Subcutaneous Given Michelle Cm     05/21/2025 0822 EDT insulin lispro (HumALOG/ADMELOG) 100 units/mL subcutaneous injection 1-5 Units 0 Units Subcutaneous Not Given Linda Hosler, RN     05/20/2025 1652 EDT insulin lispro (HumALOG/ADMELOG) 100 units/mL subcutaneous injection 1-5 Units -- Subcutaneous Not Given Linda Hosler, RN     05/20/2025 2117 EDT insulin lispro (HumALOG/ADMELOG) 100 units/mL subcutaneous injection 1-5 Units -- Subcutaneous Not Given Yo Manning RN     05/21/2025 1202 EDT multi-electrolyte (Plasmalyte-A/Isolyte-S PH 7.4/Normosol-R) IV solution 0 mL/hr Intravenous Stopped Linda Hosler, RN     05/21/2025 0435 EDT multi-electrolyte (Plasmalyte-A/Isolyte-S PH 7.4/Normosol-R) IV solution 50 mL/hr Intravenous New Prasad Manning RN     05/21/2025 0825 EDT cefadroxil (DURICEF) capsule 1,000 mg 1,000 mg Oral Given Linda Hosler, RN     05/20/2025 2119 EDT cefadroxil (DURICEF) capsule 1,000 mg 1,000 mg Oral Given  "Marcellusashathomas Manning RN     05/20/2025 1452 EDT cefadroxil (DURICEF) capsule 1,000 mg 1,000 mg Oral Given Linda Hosler, RN     05/21/2025 1233 EDT sodium bicarbonate 150 mEq in dextrose 5 % 1,000 mL infusion 100 mL/hr Intravenous New Bag Linda Hosler, RN            /73 (BP Location: Right arm)   Pulse 79   Temp 98 °F (36.7 °C) (Oral)   Resp 18   Ht 5' 10\" (1.778 m)   Wt 79.8 kg (175 lb 14.8 oz)   SpO2 97%   BMI 25.24 kg/m²       Physical Exam  Constitutional:       Appearance: Normal appearance.   HENT:      Head: Normocephalic and atraumatic.     Cardiovascular:      Rate and Rhythm: Normal rate and regular rhythm.      Pulses: Normal pulses.      Heart sounds: Normal heart sounds.   Pulmonary:      Effort: Pulmonary effort is normal.      Breath sounds: Normal breath sounds.   Abdominal:      General: Abdomen is flat.      Palpations: Abdomen is soft.     Skin:     General: Skin is warm and dry.     Neurological:      Mental Status: He is alert.           Recent Results (from the past 48 hours)   Fingerstick Glucose (POCT)    Collection Time: 05/19/25  3:49 PM   Result Value Ref Range    POC Glucose 175 (H) 65 - 140 mg/dl   Fingerstick Glucose (POCT)    Collection Time: 05/19/25  9:04 PM   Result Value Ref Range    POC Glucose 147 (H) 65 - 140 mg/dl   Basic metabolic panel    Collection Time: 05/20/25  5:41 AM   Result Value Ref Range    Sodium 141 135 - 147 mmol/L    Potassium 3.5 3.5 - 5.3 mmol/L    Chloride 106 96 - 108 mmol/L    CO2 29 21 - 32 mmol/L    ANION GAP 6 4 - 13 mmol/L    BUN 19 5 - 25 mg/dL    Creatinine 1.20 0.60 - 1.30 mg/dL    Glucose 95 65 - 140 mg/dL    Glucose, Fasting 95 65 - 99 mg/dL    Calcium 8.3 (L) 8.4 - 10.2 mg/dL    eGFR 63 ml/min/1.73sq m   Fingerstick Glucose (POCT)    Collection Time: 05/20/25  6:02 AM   Result Value Ref Range    POC Glucose 95 65 - 140 mg/dl   Fingerstick Glucose (POCT)    Collection Time: 05/20/25 11:04 AM   Result Value Ref Range    POC Glucose 93 65 - " 140 mg/dl   Fingerstick Glucose (POCT)    Collection Time: 05/20/25  4:15 PM   Result Value Ref Range    POC Glucose 88 65 - 140 mg/dl   Fingerstick Glucose (POCT)    Collection Time: 05/20/25  9:11 PM   Result Value Ref Range    POC Glucose 105 65 - 140 mg/dl   Fingerstick Glucose (POCT)    Collection Time: 05/21/25  5:52 AM   Result Value Ref Range    POC Glucose 87 65 - 140 mg/dl   TSH, 3rd generation with Free T4 reflex    Collection Time: 05/21/25  8:34 AM   Result Value Ref Range    TSH 3RD GENERATON 1.953 0.450 - 4.500 uIU/mL   Urinalysis with microscopic    Collection Time: 05/21/25  9:17 AM   Result Value Ref Range    Color, UA Colorless     Clarity, UA Clear     Specific Gravity, UA 1.010 1.003 - 1.030    pH, UA 7.5 4.5, 5.0, 5.5, 6.0, 6.5, 7.0, 7.5, 8.0    Leukocytes, UA Negative Negative    Nitrite, UA Negative Negative    Protein, UA Negative Negative mg/dl    Glucose, UA Negative Negative mg/dl    Ketones, UA Negative Negative mg/dl    Urobilinogen, UA <2.0 <2.0 mg/dl mg/dl    Bilirubin, UA Negative Negative    Occult Blood, UA Negative Negative    RBC, UA 1-2 None Seen, 1-2 /hpf    WBC, UA 2-4 (A) None Seen, 1-2 /hpf    Epithelial Cells None Seen None Seen, Occasional /hpf    Bacteria, UA None Seen None Seen, Occasional /hpf   Fingerstick Glucose (POCT)    Collection Time: 05/21/25 10:24 AM   Result Value Ref Range    POC Glucose 165 (H) 65 - 140 mg/dl   CBC and differential    Collection Time: 05/21/25 10:33 AM   Result Value Ref Range    WBC 9.02 4.31 - 10.16 Thousand/uL    RBC 3.38 (L) 3.88 - 5.62 Million/uL    Hemoglobin 10.3 (L) 12.0 - 17.0 g/dL    Hematocrit 32.0 (L) 36.5 - 49.3 %    MCV 95 82 - 98 fL    MCH 30.5 26.8 - 34.3 pg    MCHC 32.2 31.4 - 37.4 g/dL    RDW 15.9 (H) 11.6 - 15.1 %    MPV 10.5 8.9 - 12.7 fL    Platelets 144 (L) 149 - 390 Thousands/uL   Comprehensive metabolic panel    Collection Time: 05/21/25 10:33 AM   Result Value Ref Range    Sodium 142 135 - 147 mmol/L    Potassium 3.8  3.5 - 5.3 mmol/L    Chloride 105 96 - 108 mmol/L    CO2 27 21 - 32 mmol/L    ANION GAP 10 4 - 13 mmol/L    BUN 19 5 - 25 mg/dL    Creatinine 1.28 0.60 - 1.30 mg/dL    Glucose 185 (H) 65 - 140 mg/dL    Calcium 9.1 8.4 - 10.2 mg/dL    AST 24 13 - 39 U/L    ALT 40 7 - 52 U/L    Alkaline Phosphatase 70 34 - 104 U/L    Total Protein 6.1 (L) 6.4 - 8.4 g/dL    Albumin 3.5 3.5 - 5.0 g/dL    Total Bilirubin 0.49 0.20 - 1.00 mg/dL    eGFR 58 ml/min/1.73sq m   Manual Differential(PHLEBS Do Not Order)    Collection Time: 05/21/25 10:33 AM   Result Value Ref Range    Segmented % 42 (L) 43 - 75 %    Bands % 3 0 - 8 %    Lymphocytes % 36 14 - 44 %    Monocytes % 9 4 - 12 %    Eosinophils % 0 0 - 6 %    Basophils % 0 0 - 1 %    Metamyelocytes % 2 (H) 0 - 1 %    Myelocytes % 2 (H) 0 - 1 %    Atypical Lymphocytes % 6 (H) <=0 %    Absolute Neutrophils 4.06 1.85 - 7.62 Thousand/uL    Absolute Lymphocytes 3.79 0.60 - 4.47 Thousand/uL    Absolute Monocytes 0.81 0.00 - 1.22 Thousand/uL    Absolute Eosinophils 0.00 0.00 - 0.40 Thousand/uL    Absolute Basophils 0.00 0.00 - 0.10 Thousand/uL    Absolute Metamyelocytes 0.18 (H) 0.00 - 0.10 Thousand/uL    Absolute Myelocytes 0.18 (H) 0.00 - 0.10 Thousand/uL    Total Counted      RBC Morphology Present     Platelet Estimate Adequate Adequate    Pathology Review Yes (A) No    Differential Comment see note     Anisocytosis Present     Macrocytes Present    Path Slide Review    Collection Time: 05/21/25 10:33 AM   Result Value Ref Range    Case Report       Clinical Pathology Report                         Case: [id number]                                  Authorizing Provider:  Nicki Renteria MD             Collected:           05/21/2025 1033              Ordering Location:     [facility]  Received:            05/21/2025 1203                                     [facility]                                                        Pathologist:           Griffin Lange MD                                                           Specimen:    Central Venous Line                                                                        Path Slide       Peripheral blood smear review shows white blood cells with left shifted granulocytes including immature forms without significant features of dysplasia or circulating blasts.  Lymphocytes show occasional reactive-appearing forms without a distinct population of large atypical cells.  Red blood cells show normochromic normocytic anemia with mild nonspecific anisopoikilocytosis.  Platelets show normal quantity and morphology without significant satellitosis or clumping.  Overall, the combination of findings is not definitively specific.  The patient's history of CNS lymphoma with suspicion for sepsis and recent granulocyte stimulating factor administration is noted.  The findings may be compatible with the clinical history in the correct clinical setting.  Alternative etiologies or contributing factors may include infection, other drug effect or toxin exposure, autoimmune disease, postvaccination, or other systemic inflammatory condition.  Correlation with clinical impression and other laboratory findings is recommended.     UA (URINE) with reflex to Scope    Collection Time: 05/21/25 12:42 PM   Result Value Ref Range    Color, UA Light Yellow     Clarity, UA Clear     Specific Gravity, UA 1.016 1.003 - 1.030    pH, UA 5.5 4.5, 5.0, 5.5, 6.0, 6.5, 7.0, 7.5, 8.0    Leukocytes, UA Negative Negative    Nitrite, UA Negative Negative    Protein, UA Negative Negative mg/dl    Glucose, UA Negative Negative mg/dl    Ketones, UA Negative Negative mg/dl    Urobilinogen, UA <2.0 <2.0 mg/dl mg/dl    Bilirubin, UA Negative Negative    Occult Blood, UA Negative Negative       CT abdomen pelvis w contrast  Result Date: 5/19/2025  Narrative: CT ABDOMEN AND PELVIS WITH IV CONTRAST INDICATION: recurrent bacteremia, look for source. Blood culture and urine culture  positive for gram-negative rods. COMPARISON: April 30, 2025. TECHNIQUE: CT examination of the abdomen and pelvis was performed. Multiplanar 2D reformatted images were created from the source data. This examination, like all CT scans performed in the [facility], was performed utilizing techniques to minimize radiation dose exposure, including the use of iterative reconstruction and automated exposure control. Radiation dose length product (DLP) for this visit: 390.85 mGy-cm. IV Contrast: 85 mL of iohexol (OMNIPAQUE) Enteric Contrast: Not administered. FINDINGS: ABDOMEN LOWER CHEST: A small calcified granuloma is redemonstrated anteriorly at the right lung base. There is coronary artery disease. A catheter terminates in the right atrium. LIVER/BILIARY TREE: There is a 13 x 9 mm cyst posteriorly in the right lobe of the liver, similar to the prior study. GALLBLADDER: No calcified gallstones. No pericholecystic inflammatory change. SPLEEN: Unremarkable. PANCREAS: Unremarkable. ADRENAL GLANDS: Unremarkable. KIDNEYS/URETERS: Mild bilateral nonspecific perinephric stranding, increased somewhat compared with April 30, 2025. No hydronephrosis bilaterally. STOMACH AND BOWEL: No bowel obstruction. No pericolonic inflammatory change. APPENDIX: Normal. ABDOMINOPELVIC CAVITY: There is a small amount of free fluid, best demonstrated in the right lower quadrant and paracolic gutters. No pneumoperitoneum. VESSELS: Atherosclerosis. No abdominal aortic aneurysm. PELVIS REPRODUCTIVE ORGANS: Unremarkable for patient's age. URINARY BLADDER: Unremarkable. ABDOMINAL WALL/INGUINAL REGIONS: There is some gas within the subcutaneous fat of the anterior abdominal walls near the level of the umbilicus, left more so than right, possibly related to recent injections. Small fat-containing left inguinal hernia. BONES: No acute fracture or suspicious osseous lesion. Spinal degenerative changes, most notably at L5-S1.     Impression:  There is mild bilateral nonspecific perinephric stranding, however, this does appear somewhat increased compared with April 30, 2025. Correlation with urinalysis and urine culture and sensitivity is recommended. There is a small amount of free fluid. Other nonemergent findings as above. Workstation performed: [id number]     CT abdomen pelvis wo contrast  Result Date: 4/30/2025  Narrative: CT ABDOMEN AND PELVIS WITHOUT IV CONTRAST INDICATION: Gram-negative bacteremia, evaluate for source infection. COMPARISON: None. TECHNIQUE: CT examination of the abdomen and pelvis was performed without intravenous contrast. Multiplanar 2D reformatted images were created from the source data. This examination, like all CT scans performed in the [facility], was performed utilizing techniques to minimize radiation dose exposure, including the use of iterative reconstruction and automated exposure control. Radiation dose length product (DLP) for this visit: 560.94 mGy-cm. Enteric Contrast: Not administered. FINDINGS: ABDOMEN LOWER CHEST: Calcified granuloma in the right middle lobe. LIVER/BILIARY TREE: 1 cm cyst in the right lobe. Liver is otherwise unremarkable.1 GALLBLADDER: No calcified gallstones. No pericholecystic inflammatory change. SPLEEN: Unremarkable. PANCREAS: Unremarkable. ADRENAL GLANDS: Unremarkable. KIDNEYS/URETERS: Unremarkable. No hydronephrosis. STOMACH AND BOWEL: Large amount of formed stool throughout the entire colon. No evidence of obstruction. APPENDIX: Normal. ABDOMINOPELVIC CAVITY: No ascites. No pneumoperitoneum. No lymphadenopathy. VESSELS: Unremarkable for patient's age. PELVIS REPRODUCTIVE ORGANS: Unremarkable for patient's age. URINARY BLADDER: Unremarkable. ABDOMINAL WALL/INGUINAL REGIONS: Mild infiltration of the anterior abdominal wall in the left lower quadrant, possibly a soft tissue contusion. BONES: No acute fracture or suspicious osseous lesion.     Impression: No acute  inflammatory process in the abdomen or pelvis. Large amount of stool throughout the colon. Workstation performed: [id number]     XR abdomen obstruction series  Result Date: 4/30/2025  Narrative: XR ABDOMEN OBSTRUCTION SERIES INDICATION: Constipation, lethargy, gram-negative bacteremia. Confusion, newly diagnosed intracranial mass. Constipation. COMPARISON: MRI abdomen 3/30/2025 and CT chest abdomen pelvis 3/29/2025 FINDINGS: Large volume of colonic stool. Otherwise unremarkable bowel gas pattern. No pneumoperitoneum or abnormal air-fluid levels. No pathologic calcification or soft tissue mass. Bones are unremarkable for patient's age. Examination of the chest reveals clear lungs and a normal cardiomediastinal silhouette. Right PICC with tip projecting over the superior cavoatrial junction.     Impression: 1.  Large colonic stool burden without obstruction or other acute abdominal findings. 2.  No acute cardiopulmonary findings. Resident: CINTHYA FAJARDO I, the attending radiologist, have reviewed the images and agree with the final report above. Workstation performed: [id number]       I have personally reviewed labs, imaging studies, and pertinent reports.      This note has been generated by voice recognition software system.  Therefore, there may be spelling, grammar, and or syntax errors. Please contact if questions arise.

## 2025-05-21 NOTE — ASSESSMENT & PLAN NOTE
Monitor status post methotrexate  Peak creat was 2.77 on 4/22/25  Previous baseline as OP 5/2023-2/22/25 was 1.1 to 1.0  CMP 5/9/25 with creatinine of 1.18 down from 1.23 on 5/8 and on 5/10 was 1.15  CMP on 5/12/25 showed creatinine stable at 1.1  5/17: creat up again to 1.38 = Being followed by Nephrology. Met sepsis criteria 5/17 and placed on IVF ordered as per sepsis protocol  5/19: creat trending down and was 1.20 -->  IVF was  reduced to 50ml/hr per renal.  On 5/20/25, they want to continue same IVF  Creatinine today 5/21 is 1.28

## 2025-05-21 NOTE — ASSESSMENT & PLAN NOTE
Diagnosed by LP, brain biopsy.  Complicated by obstructive hydrocephalus, status post EVD 4/13 through 4/19.  Status post Rituxan, high-dose methotrexate and intrathecal cytarabine on 4/17.  Received repeat dose of Rituxan on 5/10.    Low risk from ID perspective for chemo given clinical improvement, plan to continue antibiotics for longer course

## 2025-05-22 LAB
ALBUMIN SERPL BCG-MCNC: 3.2 G/DL (ref 3.5–5)
ALP SERPL-CCNC: 58 U/L (ref 34–104)
ALT SERPL W P-5'-P-CCNC: 32 U/L (ref 7–52)
AMORPH URATE CRY URNS QL MICRO: ABNORMAL
ANION GAP SERPL CALCULATED.3IONS-SCNC: 5 MMOL/L (ref 4–13)
ANISOCYTOSIS BLD QL SMEAR: PRESENT
AST SERPL W P-5'-P-CCNC: 16 U/L (ref 13–39)
BACTERIA UR QL AUTO: ABNORMAL /HPF
BACTERIA UR QL AUTO: NORMAL /HPF
BASOPHILS # BLD MANUAL: 0 THOUSAND/UL (ref 0–0.1)
BASOPHILS NFR MAR MANUAL: 0 % (ref 0–1)
BILIRUB SERPL-MCNC: 0.74 MG/DL (ref 0.2–1)
BILIRUB UR QL STRIP: NEGATIVE
BUN SERPL-MCNC: 24 MG/DL (ref 5–25)
CALCIUM ALBUM COR SERPL-MCNC: 9 MG/DL (ref 8.3–10.1)
CALCIUM SERPL-MCNC: 8.4 MG/DL (ref 8.4–10.2)
CHLORIDE SERPL-SCNC: 100 MMOL/L (ref 96–108)
CLARITY UR: CLEAR
CO2 SERPL-SCNC: 33 MMOL/L (ref 21–32)
COLOR UR: ABNORMAL
COLOR UR: COLORLESS
COLOR UR: COLORLESS
COLOR UR: YELLOW
CREAT SERPL-MCNC: 1.26 MG/DL (ref 0.6–1.3)
EOSINOPHIL # BLD MANUAL: 0 THOUSAND/UL (ref 0–0.4)
EOSINOPHIL NFR BLD MANUAL: 0 % (ref 0–6)
ERYTHROCYTE [DISTWIDTH] IN BLOOD BY AUTOMATED COUNT: 14.6 % (ref 11.6–15.1)
GFR SERPL CREATININE-BSD FRML MDRD: 59 ML/MIN/1.73SQ M
GLUCOSE P FAST SERPL-MCNC: 187 MG/DL (ref 65–99)
GLUCOSE SERPL-MCNC: 128 MG/DL (ref 65–140)
GLUCOSE SERPL-MCNC: 147 MG/DL (ref 65–140)
GLUCOSE SERPL-MCNC: 187 MG/DL (ref 65–140)
GLUCOSE SERPL-MCNC: 193 MG/DL (ref 65–140)
GLUCOSE SERPL-MCNC: 226 MG/DL (ref 65–140)
GLUCOSE UR STRIP-MCNC: ABNORMAL MG/DL
GLUCOSE UR STRIP-MCNC: NEGATIVE MG/DL
HCT VFR BLD AUTO: 25.7 % (ref 36.5–49.3)
HGB BLD-MCNC: 8.9 G/DL (ref 12–17)
HGB UR QL STRIP.AUTO: NEGATIVE
KETONES UR STRIP-MCNC: NEGATIVE MG/DL
LEUKOCYTE ESTERASE UR QL STRIP: NEGATIVE
LYMPHOCYTES # BLD AUTO: 1.77 THOUSAND/UL (ref 0.6–4.47)
LYMPHOCYTES # BLD AUTO: 16 % (ref 14–44)
MCH RBC QN AUTO: 31 PG (ref 26.8–34.3)
MCHC RBC AUTO-ENTMCNC: 34.6 G/DL (ref 31.4–37.4)
MCV RBC AUTO: 90 FL (ref 82–98)
METAMYELOCYTE ABSOLUTE CT: 0.2 THOUSAND/UL (ref 0–0.1)
METAMYELOCYTES NFR BLD MANUAL: 2 % (ref 0–1)
MICROCYTES BLD QL AUTO: PRESENT
MONOCYTES # BLD AUTO: 0.49 THOUSAND/UL (ref 0–1.22)
MONOCYTES NFR BLD: 5 % (ref 4–12)
MUCOUS THREADS UR QL AUTO: ABNORMAL
NEUTROPHILS # BLD MANUAL: 7.39 THOUSAND/UL (ref 1.85–7.62)
NEUTS BAND NFR BLD MANUAL: 10 % (ref 0–8)
NEUTS SEG NFR BLD AUTO: 65 % (ref 43–75)
NITRITE UR QL STRIP: NEGATIVE
NON-SQ EPI CELLS URNS QL MICRO: ABNORMAL /HPF
NON-SQ EPI CELLS URNS QL MICRO: NORMAL /HPF
NRBC BLD AUTO-RTO: 2 /100 WBC (ref 0–2)
PH UR STRIP.AUTO: 7.5 [PH]
PH UR STRIP.AUTO: 8 [PH]
PLATELET # BLD AUTO: 145 THOUSANDS/UL (ref 149–390)
PLATELET BLD QL SMEAR: ADEQUATE
PMV BLD AUTO: 10.6 FL (ref 8.9–12.7)
POLYCHROMASIA BLD QL SMEAR: PRESENT
POTASSIUM SERPL-SCNC: 4 MMOL/L (ref 3.5–5.3)
PROT SERPL-MCNC: 5.3 G/DL (ref 6.4–8.4)
PROT UR STRIP-MCNC: ABNORMAL MG/DL
PROT UR STRIP-MCNC: ABNORMAL MG/DL
PROT UR STRIP-MCNC: NEGATIVE MG/DL
PROT UR STRIP-MCNC: NEGATIVE MG/DL
RBC # BLD AUTO: 2.87 MILLION/UL (ref 3.88–5.62)
RBC #/AREA URNS AUTO: ABNORMAL /HPF
RBC #/AREA URNS AUTO: NORMAL /HPF
RBC MORPH BLD: PRESENT
SODIUM SERPL-SCNC: 138 MMOL/L (ref 135–147)
SP GR UR STRIP.AUTO: 1.01 (ref 1–1.03)
SP GR UR STRIP.AUTO: 1.02 (ref 1–1.03)
UROBILINOGEN UR STRIP-ACNC: <2 MG/DL
VARIANT LYMPHS # BLD AUTO: 2 %
WBC # BLD AUTO: 9.85 THOUSAND/UL (ref 4.31–10.16)
WBC #/AREA URNS AUTO: ABNORMAL /HPF
WBC #/AREA URNS AUTO: NORMAL /HPF

## 2025-05-22 PROCEDURE — 80204 DRUG ASSAY METHOTREXATE: CPT | Performed by: STUDENT IN AN ORGANIZED HEALTH CARE EDUCATION/TRAINING PROGRAM

## 2025-05-22 PROCEDURE — 97110 THERAPEUTIC EXERCISES: CPT

## 2025-05-22 PROCEDURE — G0545 PR INHERENT VISIT TO INPT: HCPCS | Performed by: INTERNAL MEDICINE

## 2025-05-22 PROCEDURE — 81003 URINALYSIS AUTO W/O SCOPE: CPT | Performed by: INTERNAL MEDICINE

## 2025-05-22 PROCEDURE — 97535 SELF CARE MNGMENT TRAINING: CPT

## 2025-05-22 PROCEDURE — 99232 SBSQ HOSP IP/OBS MODERATE 35: CPT | Performed by: INTERNAL MEDICINE

## 2025-05-22 PROCEDURE — 81003 URINALYSIS AUTO W/O SCOPE: CPT

## 2025-05-22 PROCEDURE — 85007 BL SMEAR W/DIFF WBC COUNT: CPT | Performed by: STUDENT IN AN ORGANIZED HEALTH CARE EDUCATION/TRAINING PROGRAM

## 2025-05-22 PROCEDURE — 82948 REAGENT STRIP/BLOOD GLUCOSE: CPT

## 2025-05-22 PROCEDURE — 97530 THERAPEUTIC ACTIVITIES: CPT

## 2025-05-22 PROCEDURE — 85027 COMPLETE CBC AUTOMATED: CPT | Performed by: STUDENT IN AN ORGANIZED HEALTH CARE EDUCATION/TRAINING PROGRAM

## 2025-05-22 PROCEDURE — 99233 SBSQ HOSP IP/OBS HIGH 50: CPT | Performed by: STUDENT IN AN ORGANIZED HEALTH CARE EDUCATION/TRAINING PROGRAM

## 2025-05-22 PROCEDURE — 97129 THER IVNTJ 1ST 15 MIN: CPT

## 2025-05-22 PROCEDURE — 99232 SBSQ HOSP IP/OBS MODERATE 35: CPT | Performed by: NURSE PRACTITIONER

## 2025-05-22 PROCEDURE — 97130 THER IVNTJ EA ADDL 15 MIN: CPT

## 2025-05-22 PROCEDURE — 80053 COMPREHEN METABOLIC PANEL: CPT | Performed by: STUDENT IN AN ORGANIZED HEALTH CARE EDUCATION/TRAINING PROGRAM

## 2025-05-22 PROCEDURE — 81001 URINALYSIS AUTO W/SCOPE: CPT | Performed by: INTERNAL MEDICINE

## 2025-05-22 RX ORDER — DIPHENHYDRAMINE HYDROCHLORIDE 50 MG/ML
25 INJECTION, SOLUTION INTRAMUSCULAR; INTRAVENOUS ONCE
Status: COMPLETED | OUTPATIENT
Start: 2025-05-22 | End: 2025-05-22

## 2025-05-22 RX ORDER — METFORMIN HYDROCHLORIDE 500 MG/1
1000 TABLET, EXTENDED RELEASE ORAL DAILY
Status: DISCONTINUED | OUTPATIENT
Start: 2025-05-22 | End: 2025-05-28 | Stop reason: HOSPADM

## 2025-05-22 RX ORDER — ACETAMINOPHEN 325 MG/1
650 TABLET ORAL ONCE
Status: COMPLETED | OUTPATIENT
Start: 2025-05-22 | End: 2025-05-22

## 2025-05-22 RX ORDER — SODIUM CHLORIDE 9 MG/ML
20 INJECTION, SOLUTION INTRAVENOUS ONCE AS NEEDED
Status: DISCONTINUED | OUTPATIENT
Start: 2025-05-22 | End: 2025-05-27

## 2025-05-22 RX ADMIN — QUETIAPINE 50 MG: 25 TABLET ORAL at 21:22

## 2025-05-22 RX ADMIN — SENNOSIDES 17.2 MG: 8.6 TABLET, FILM COATED ORAL at 11:35

## 2025-05-22 RX ADMIN — DOCUSATE SODIUM 100 MG: 100 CAPSULE, LIQUID FILLED ORAL at 08:36

## 2025-05-22 RX ADMIN — METFORMIN ER 500 MG 1000 MG: 500 TABLET ORAL at 11:00

## 2025-05-22 RX ADMIN — CEFADROXIL 1000 MG: 500 CAPSULE ORAL at 21:23

## 2025-05-22 RX ADMIN — CEFADROXIL 1000 MG: 500 CAPSULE ORAL at 08:36

## 2025-05-22 RX ADMIN — ACETAMINOPHEN 650 MG: 325 TABLET ORAL at 15:37

## 2025-05-22 RX ADMIN — HEPARIN SODIUM 5000 UNITS: 5000 INJECTION INTRAVENOUS; SUBCUTANEOUS at 13:49

## 2025-05-22 RX ADMIN — PANTOPRAZOLE SODIUM 40 MG: 40 TABLET, DELAYED RELEASE ORAL at 06:02

## 2025-05-22 RX ADMIN — HEPARIN SODIUM 5000 UNITS: 5000 INJECTION INTRAVENOUS; SUBCUTANEOUS at 21:22

## 2025-05-22 RX ADMIN — SITAGLIPTIN 50 MG: 50 TABLET, FILM COATED ORAL at 08:36

## 2025-05-22 RX ADMIN — SODIUM BICARBONATE 100 ML/HR: 84 INJECTION, SOLUTION INTRAVENOUS at 14:44

## 2025-05-22 RX ADMIN — INSULIN LISPRO 2 UNITS: 100 INJECTION, SOLUTION INTRAVENOUS; SUBCUTANEOUS at 11:37

## 2025-05-22 RX ADMIN — CHLORHEXIDINE GLUCONATE 15 ML: 1.2 SOLUTION ORAL at 09:00

## 2025-05-22 RX ADMIN — HEPARIN SODIUM 5000 UNITS: 5000 INJECTION INTRAVENOUS; SUBCUTANEOUS at 06:02

## 2025-05-22 RX ADMIN — DOCUSATE SODIUM 100 MG: 100 CAPSULE, LIQUID FILLED ORAL at 18:22

## 2025-05-22 RX ADMIN — CHLORHEXIDINE GLUCONATE 15 ML: 1.2 SOLUTION ORAL at 21:22

## 2025-05-22 RX ADMIN — LEUCOVORIN CALCIUM 25 MG: 50 INJECTION, POWDER, LYOPHILIZED, FOR SOLUTION INTRAMUSCULAR; INTRAVENOUS at 21:24

## 2025-05-22 RX ADMIN — SODIUM BICARBONATE 100 ML/HR: 84 INJECTION, SOLUTION INTRAVENOUS at 01:07

## 2025-05-22 RX ADMIN — Medication 6 MG: at 21:22

## 2025-05-22 RX ADMIN — INSULIN LISPRO 1 UNITS: 100 INJECTION, SOLUTION INTRAVENOUS; SUBCUTANEOUS at 08:34

## 2025-05-22 RX ADMIN — DIPHENHYDRAMINE HYDROCHLORIDE 25 MG: 50 INJECTION, SOLUTION INTRAMUSCULAR; INTRAVENOUS at 15:39

## 2025-05-22 RX ADMIN — ATORVASTATIN CALCIUM 20 MG: 20 TABLET, FILM COATED ORAL at 08:36

## 2025-05-22 RX ADMIN — QUETIAPINE 12.5 MG: 25 TABLET ORAL at 11:35

## 2025-05-22 RX ADMIN — ALLOPURINOL 300 MG: 300 TABLET ORAL at 08:36

## 2025-05-22 NOTE — ASSESSMENT & PLAN NOTE
Lab Results   Component Value Date    HGBA1C 6.6 (H) 02/22/2025       Recent Labs     05/21/25  1024 05/21/25  1620 05/21/25 2045 05/22/25  0626   POCGLU 165* 208* 264* 193*       Blood Sugar Average: Last 72 hrs:  (P) [id number]    - Metformin 1000mg daily restarted today 5/22  - Lantus 10u daily with Lispro 8u TID and SSI  - Monitor accuchecks while on steroids  - Management per IM

## 2025-05-22 NOTE — PROGRESS NOTES
05/22/25 1000   Pain Assessment   Pain Assessment Tool 0-10   Pain Score No Pain   Restrictions/Precautions   Precautions 1:1;Bed/chair alarms;Cognitive;Fall Risk;Limb alert;Impulsive;Supervision on toilet/commode;Multiple lines  (chemo precautions)   Weight Bearing Restrictions No   ROM Restrictions No   Comprehension   Comprehension (FIM) 4 - Understands basic info/conversation 75-90% of time   Expression   Expression (FIM) 5 - Needs help/cues only RARELY (< 10% of the time)   Social Interaction   Social Interaction (FIM) 5 - Interacts appropriately with others 90% of time   Problem Solving   Problem solving (FIM) 2 - Solves basic problems 25-49% of time   Memory   Memory (FIM) 2 - Recognizes, recalls/performs 25-49%   Speech/Language/Cognition Assessmetn   Treatment Assessment Pt seen for skilled speech therapy session targeting cognitive linguistic communication skills. Pt alert and interactive- completed the following skilled therapy tasks. Engaged in category sorting task with 4 concrete options- pt given lists of words below to sort appropriately with 20/20acc. Noted this task was completed earlier in admission, pt required mod to max A to complete, and today, pt was able to initiate and continue with task progression with no prompting. Also noted spelling and word placements in boxes improved with better organization. Pt next completed exclusion task with ID which word does not belong (concrete). Pt was able to complete this as well with 15/15acc. Some extended processing time needed but overall completed with faster pace and less cuing as provided in previous sessions. Pt is pending discharge later this week for cont'd medical changes. FT has been completed, recommendations are for 24hr S/A which family is able to provide. Further ST services warranted to cont to target cognitive skills. Pt overall is improving with skilled SLP services and will cont to benefit to maximize overall cognitive linguistic  communication abilities at this time.   SLP Therapy Minutes   SLP Time In 1000   SLP Time Out 1030   SLP Total Time (minutes) 30   SLP Mode of treatment - Individual (minutes) 30   SLP Mode of treatment - Concurrent (minutes) 0   SLP Mode of treatment - Group (minutes) 0   SLP Mode of treatment - Co-treat (minutes) 0   SLP Mode of Treatment - Total time(minutes) 30 minutes   SLP Cumulative Minutes 570   Therapy Time missed   Time missed? No

## 2025-05-22 NOTE — ASSESSMENT & PLAN NOTE
Hgb dropped to 6.3 on 5/18 and was confirmed by repeat lab  at 6.7 so was transfused with one unit of PRBCs after discussion with Oncology  Spoke with wife over the phone and consent obtained  On 5/19/25, hemoglobin was up to 8.0 = appropriate response to transfusion  Today 5/22/25, hemoglobin is 8.9.

## 2025-05-22 NOTE — CASE MANAGEMENT
Clinical update faxed to [facility] as pts' dc may not occur until Saturday. Faxed via epic to [phone], awaiting determination.

## 2025-05-22 NOTE — PROGRESS NOTES
Progress Note - Nephrology   Name: Alexis Dennison 64 y.o. male I MRN: [id number]  Unit/Bed#: -01 I Date of Admission: 5/7/2025   Date of Service: 5/22/2025 I Hospital Day: 15    Assessment & Plan  LETY (acute kidney injury) (HCC)  Baseline creatinine 0.8 to 1.1.   Etiology suspected to be due to prerenal azotemia/possible early ATN  Peak SCr 2.77 on 4/22/25.   Renal function improved and currently stable at creatinine 1.26 mg/dL, electrolytes are stable, urine pH 7.8 today.  Patient's status post 6000 mg methotrexate on 5/21.  So far renal function stable  Continue sodium bicarbonate drip at 100 mL/h  Avoid nephrotoxins    HTN (hypertension)  BP stable and is at goal  Not on BP meds.  Avoid hypotension    Primary central nervous system (CNS) lymphoma  Hematology is following.     Complicated by obstructive hydrocephalus, status post EVD on 4/13 through 4/19.    Status post rituximab, status post high-dose methotrexate and intrathecal cytarabine on 4/17.  Repeat dose of rituximab on 5/10  Methotrexate was on hold earlier in the week due to suspected sepsis with blood culture positive for gram-negative katherine and urine culture positive for gram-negative katherine.  Seen by ID, status post IV antibiotics and now switched to Duricef on 5/20, was cleared for chemotherapy.  Patient received IV methotrexate on 5/21.  Continue to monitor renal function continue bicarb drip as mentioned above with plan to keep urine pH more than 7   Sepsis (HCC)  Blood culture urine culture positive for gram-negative rods-E. coli.  Urine culture was positive for E. coli was found to be tachycardic and hypotensive and had leukopenia.  Continue   antibiotic per infectious disease  Anemia  Status post PRBC, hemoglobin trended down to 8.9 g/dL today, continue to monitor per primary team    I have reviewed the nephrology recommendations including renal function stable and bicarb level stable and urine pH stable with bicarb drip and would continue same  treatment, with primary team, and we are in agreement with renal plan including the information outlined above.     Subjective   No new complaints.  No chest pain or shortness of breath, no nausea vomiting    Objective :  Temp:  [97.5 °F (36.4 °C)-98 °F (36.7 °C)] 97.5 °F (36.4 °C)  HR:  [64-67] 67  BP: (118-148)/(74-78) 118/74  Resp:  [17-18] 17  SpO2:  [96 %-97 %] 97 %  O2 Device: None (Room air)    Current Weight: Weight - Scale: 80 kg (176 lb 5.9 oz)  First Weight: Weight - Scale: 75.9 kg (167 lb 6.4 oz)  I/O         05/20 0701 05/21 0700 05/21 0701 05/22 0700 05/22 0701 05/23 0700    P.O. 1080 240 610    I.V. (mL/kg)       IV Piggyback       Total Intake(mL/kg) 1080 (13.5) 240 (3) 610 (7.6)    Urine (mL/kg/hr)  480 (0.3)     Total Output  480     Net +1080 -240 +610           Unmeasured Urine Occurrence 1 x 1 x     Unmeasured Stool Occurrence 1 x            Physical Exam   General:  Ill looking, awake.  Eyes: Conjunctivae pink,  Sclera anicteric  ENT: lips and mucous membranes moist  Neck: supple   Chest: Clear to Auscultation both lungs,  no crackles, ronchus or wheezing.  CVS: S1 & S2 present, normal rate, regular rhythm, no murmur.  Abdomen: soft, non-tender, non-distended, Bowel sounds normoactive  Extremities: no edema of  legs  Skin: no rash  Neuro: awake, alert, oriented x 3   Psych: Mood and affect appropriate      Medications:  Current Medications[1]      Lab Results: I have reviewed the following results:  Results from last 7 days   Lab Units 05/22/25  0613 05/21/25  1033 05/20/25  0541 05/19/25  0559 05/18/25  0856 05/18/25  0543 05/17/25  0941 05/17/25  0537 05/16/25  0618   WBC Thousand/uL 9.85 9.02  --  3.93* 1.84* 1.91* 1.26*  --   --    HEMOGLOBIN g/dL 8.9* 10.3*  --  8.0* 6.7* 6.3* 8.5*  --   --    HEMATOCRIT % 25.7* 32.0*  --  23.7* 20.1* 18.9* 24.1*  --   --    PLATELETS Thousands/uL 145* 144*  --  79* 66* 68* 85*  --   --    POTASSIUM mmol/L 4.0 3.8 3.5 3.9  --  3.5  --  3.8 4.2  "  CHLORIDE mmol/L 100 105 106 107  --  105  --  92* 98   CO2 mmol/L 33* 27 29 27  --  30  --  31 29   BUN mg/dL 24 19 19 19  --  20  --  25 27*   CREATININE mg/dL 1.26 1.28 1.20 1.20  --  1.28  --  1.38* 1.19   CALCIUM mg/dL 8.4 9.1 8.3* 7.9*  --  7.4*  --  8.3* 8.8   ALBUMIN g/dL 3.2* 3.5  --  2.8*  --  2.6*  --  3.4*  --        Administrative Statements     Portions of the record may have been created with voice recognition software. Occasional wrong word or \"sound a like\" substitutions may have occurred due to the inherent limitations of voice recognition software. Read the chart carefully and recognize, using context, where substitutions have occurred.If you have any questions, please contact the dictating provider.       [1]   Current Facility-Administered Medications:     acetaminophen (TYLENOL) tablet 650 mg, 650 mg, Oral, Q6H PRN, Crispin Arana MD, 650 mg at 05/16/25 0844    allopurinol (ZYLOPRIM) tablet 300 mg, 300 mg, Oral, Daily, Crispin Arana MD, 300 mg at 05/22/25 0836    alteplase (CATHFLO) injection 2 mg, 2 mg, Intracatheter, Q1MIN PRN, Crispin Arana MD    alteplase (CATHFLO) injection 2 mg, 2 mg, Intracatheter, Q1MIN PRN, Magali Lee MD    alteplase (CATHFLO) injection 2 mg, 2 mg, Intracatheter, Q1MIN PRN, Satnam Kebede MD    aluminum-magnesium hydroxide-simethicone (MAALOX) oral suspension 30 mL, 30 mL, Oral, Q4H PRN, Crispin Arana MD    atorvastatin (LIPITOR) tablet 20 mg, 20 mg, Oral, Daily, Crispin Arana MD, 20 mg at 05/22/25 0836    bisacodyl (DULCOLAX) rectal suppository 10 mg, 10 mg, Rectal, Daily PRN, Skyler Hendrickson MD    calcium carbonate (TUMS) chewable tablet 1,000 mg, 1,000 mg, Oral, Daily PRN, Crispin Arana MD    cefadroxil (DURICEF) capsule 1,000 mg, 1,000 mg, Oral, Q12H CAIT, Lisa Singh MD, 1,000 mg at 05/22/25 0836    chlorhexidine (PERIDEX) 0.12 % oral rinse 15 mL, 15 mL, Mouth/Throat, Q12H CAIT, Crispin Arana MD, 15 mL at 05/22/25 0900    docusate sodium (COLACE) " capsule 100 mg, 100 mg, Oral, BID, Jessica Arreola, DO, 100 mg at 05/22/25 0836    heparin (porcine) subcutaneous injection 5,000 Units, 5,000 Units, Subcutaneous, Q8H CAIT, Crispin Arana MD, 5,000 Units at 05/22/25 1349    insulin lispro (HumALOG/ADMELOG) 100 units/mL subcutaneous injection 1-5 Units, 1-5 Units, Subcutaneous, TID AC, 2 Units at 05/22/25 1137 **AND** Fingerstick Glucose (POCT), , , TID AC, PATI Porras    insulin lispro (HumALOG/ADMELOG) 100 units/mL subcutaneous injection 1-5 Units, 1-5 Units, Subcutaneous, HS, PATI Porras, 2 Units at 05/21/25 2111    lactulose (CHRONULAC) oral solution 20 g, 20 g, Oral, Daily PRN, Joshua Kaminski DO, 20 g at 05/14/25 1805    melatonin tablet 6 mg, 6 mg, Oral, HS, Crispin Arana MD, 6 mg at 05/21/25 2110    metFORMIN (GLUCOPHAGE-XR) 24 hr tablet 1,000 mg, 1,000 mg, Oral, Daily, PATI Porras, 1,000 mg at 05/22/25 1100    OLANZapine (ZyPREXA) IM injection 5 mg, 5 mg, Intramuscular, BID PRN, Crispin Arana MD    ondansetron (ZOFRAN) injection 4 mg, 4 mg, Intravenous, Q6H PRN, Crispin Arana MD    oxyCODONE (ROXICODONE) split tablet 2.5 mg, 2.5 mg, Oral, Q4H PRN, 2.5 mg at 05/16/25 0844 **OR** oxyCODONE (ROXICODONE) IR tablet 5 mg, 5 mg, Oral, Q4H PRN, Crispin Arana MD, 5 mg at 05/18/25 0221    pantoprazole (PROTONIX) EC tablet 40 mg, 40 mg, Oral, Early Morning, Crispin Arana MD, 40 mg at 05/22/25 0602    polyethylene glycol (MIRALAX) packet 17 g, 17 g, Oral, Daily PRN, Skyler Hendrickson MD    QUEtiapine (SEROquel) tablet 12.5 mg, 12.5 mg, Oral, Daily, Crispin Arana MD, 12.5 mg at 05/22/25 1135    QUEtiapine (SEROquel) tablet 50 mg, 50 mg, Oral, HS, Crispin Arana MD, 50 mg at 05/21/25 2110    senna (SENOKOT) tablet 17.2 mg, 2 tablet, Oral, Daily With Lunch, Jessica Arreola DO, 17.2 mg at 05/22/25 1135    sitaGLIPtin (JANUVIA) tablet 50 mg, 50 mg, Oral, Daily, PATI Porras, 50 mg at 05/22/25 0836    sodium  bicarbonate 150 mEq in dextrose 5 % 1,000 mL infusion, 100 mL/hr, Intravenous, Continuous, Johnny Naik MD, Last Rate: 100 mL/hr at 05/22/25 0107, 100 mL/hr at 05/22/25 0107    sodium chloride 0.9 % infusion, 20 mL/hr, Intravenous, Once PRN, Satnam Kebede MD

## 2025-05-22 NOTE — PROGRESS NOTES
"Progress Note - Hospitalist   Name: Alexis Dennison 64 y.o. male I MRN: [id number]  Unit/Bed#: -01 I Date of Admission: 5/7/2025   Date of Service: 5/22/2025 I Hospital Day: 15    Assessment & Plan  Primary central nervous system (CNS) lymphoma  Patient with development of worsening confusion, and was seen in the ED on 3/24/2025.  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  Lymphoma/leukemia panel was nondiagnostic  CTH 4/3/25: concerning for worsening hydrocephalus.   Repeat LP 4/7/25 = undiagnostic again   MRI brain 4/11/25: \"Interval enlargement of the dominant left anterior basal ganglionic mass lesion with greater surrounding vasogenic edema and mass effect. 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  S/p EVD, self removed 4/26  S/p Keppra 500mg BID x 7 days for postoperative seizure ppx   Started on chemotherapy during hospital stay and is for weekly Rituximab and Methotrexate every 2 weeks  Continue Dexamethasone taper = LD was on 5/19/25  Maintain PICC line  Had Rituxan 5/10/25  Did get Filgrastim 5/17 and 5/18  Was due for Methotrexate on 5/17 but + sepsis w/bacteremia.   ID cleared pt to get chemo and he did get the Methotrexate 5/21/25 and Decadron 10 mg IV x 1, Zofran 16mg IV  Renal maintaining bicarb drip and pt getting q6hr UAs for urine pH.    To get Leucovorin 24 hr post Methotrexate (will be given this evening)  To get Methotrexate levels until " <0.10  For daily CBC, CMP  Pt has an appt with Dr Phan on 6/3/25   Type 2 diabetes mellitus with hyperglycemia, without long-term current use of insulin (HCC)  Hemoglobin A1C was 6.6 on 2/22/25  Sees Endo [facility] in [place]  Home:  Janumet XR  qd  Here: Januvia 50mg qd  Continue DM diet  On QID Accuchecks with SSI Algo 1  BSs have continued to improve with steroid being tapered  = LD was 5/19 5/17 creat bumped up to 1.38 so Metformin was placed on hold  Creatinine is 1.26 today 5/22.    Since creatinine is stable, will restart Metformin 1000 mg XR to start now 5/22/25. (Last contrast study was 5/18/25).  I d/w pharmacy here and there is no contraindication to restarting his Metformin with his recent chemo and getting Leucovorin tonight  Stopped the Lispro WM 5/21/25.    Mixed hyperlipidemia  Continue atorvastatin  Did have slightly elevated LFT's on prior labs but most recently normal, consider holding statin if LFTs trend back up  Thyroid nodule  TSH/free T4 4/21/25 = 0.019/1.13  Repeat TSH/free T4 done 5/12 = TSH was 0.287 with free T4 0.85  TSH has normalized 5/21.  D/W [facility] -->  plan for OP US.  Pulmonary nodule  CT chest 3 months follow-up  Liver lesion  Patient will require outpatient follow-up  Encephalopathy  Continue Seroquel  S/p steroid taper- last dose was 5/19/25  On 1:1  LETY (acute kidney injury) (HCC)  Monitor status post methotrexate  Peak creat was 2.77 on 4/22/25  Previous baseline as OP 5/2023-2/22/25 was 1.1 to 1.0  CMP 5/9/25 with creatinine of 1.18 down from 1.23 on 5/8 and on 5/10 was 1.15  CMP on 5/12/25 showed creatinine stable at 1.1  5/17: creat up again to 1.38 = Being followed by Nephrology. Met sepsis criteria 5/17 and placed on IVF ordered as per sepsis protocol  5/19: creat trending down and was 1.20 -->  IVF was  reduced to 50ml/hr per renal.  On 5/20/25-5/21/25, they want to continue same IVF  Creatinine today 5/22 is 1.26  For CMP  "5/23/25  Sepsis (HCC)  Patient noted to have hypotension and tachycardia on 5/17  Sepsis workup ordered and found to have positive blood cultures again, growing E coli as before  ID reconsulted  Started Cefepime 5/17/25 = await blood urine cx results but preliminary results of urine >100,000 GNR and prelim blood cx = GNR with blood cx ID panel positive for E.coli  Continue IVF = renal managing  He has had stable BP, tachycardia has resolved and he has had no fevers  Updated plan from ID as below  Recurrent E. coli bacteremia  Patient completed 7 days of IV cefazolin which was finished on May 5  CT of A/P was done 5/18 per recommendation from ID looking for possible reason for recurrent bacteremia = was unrevealing  Was on Cetriaxone --> ID changed to Duricef to continue through 5/30 for a 14 days total of antibiotic tx  HTN (hypertension)  Home:  Losartan -25 qd  Here:  no meds   Stable BP today 5/22/25  Transaminitis  AST is 111, previously 114,  ALT is 322 previously 149  D/w H-O, they are aware =  \"Could be due to recent chemotherapy versus antibiotics versus liver lesions\".    CMP 5/9/25 = AST 57 (previously 111),  (previously 322)  Resolved  Leukopenia  resolved  Hyponatremia  resolved  Thrombocytopenia (HCC)  Per H-O  Platelets dropped to 66 on 5/18 and on 5/19 was up to 79 --> on 5/21 up to 144 and today 5/22 is 145  For platelet transfusion if counts drop below 20K or in setting of any spontaneous bleeding   Anemia  Hgb dropped to 6.3 on 5/18 and was confirmed by repeat lab  at 6.7 so was transfused with one unit of PRBCs after discussion with Oncology  Spoke with wife over the phone and consent obtained  On 5/19/25, hemoglobin was up to 8.0 = appropriate response to transfusion  Today 5/22/25, hemoglobin is 8.9.    The above assessment and plan was reviewed and updated as determined by my evaluation of the patient on 5/22/2025.    History of Present Illness   Patient seen and examined. " Patients overnight issues or events were reviewed with nursing staff. New or overnight issues include the following:   No new or overnight issues.  Offers no complaints.    Review of Systems   All other systems reviewed and are negative.      Objective :  Temp:  [97.5 °F (36.4 °C)-98 °F (36.7 °C)] 97.5 °F (36.4 °C)  HR:  [64-79] 67  BP: (115-148)/(73-78) 118/74  Resp:  [17-18] 17  SpO2:  [96 %-97 %] 97 %  O2 Device: None (Room air)    Invasive Devices       Peripherally Inserted Central Catheter Line  Duration             PICC Line 05/01/25 Left Brachial 20 days                    Physical Exam  General Appearance: no distress, nontoxic appearing  HEENT:  External ear normal.  Nose normal w/o drainage. Mucous membranes are moist. Oropharynx is clear. Conjunctiva clear w/o icterus or redness.  Neck:  Supple, normal ROM  Lungs: BBS without crackles/wheeze/rhonchi; respirations unlabored with normal inspiratory/expiratory effort.  No retractions noted.  On RA  CV: regular rate and rhythm; no rubs/murmurs/gallops, PMI normal   ABD: Abdomen is soft.  Bowel sounds all quadrants.  Nontender with no distention.    EXT: no edema  Skin: normal turgor, normal texture  Psych: affect normal, mood normal  Neuro: AA       The above physical exam was reviewed and updated as determined by my evaluation of the patient on 5/22/2025.      Lab Results: I have reviewed the following results:  Results from last 7 days   Lab Units 05/22/25  0613 05/21/25  1033   WBC Thousand/uL 9.85 9.02   HEMOGLOBIN g/dL 8.9* 10.3*   HEMATOCRIT % 25.7* 32.0*   PLATELETS Thousands/uL 145* 144*     Results from last 7 days   Lab Units 05/22/25  0613 05/21/25  1033   SODIUM mmol/L 138 142   POTASSIUM mmol/L 4.0 3.8   CHLORIDE mmol/L 100 105   CO2 mmol/L 33* 27   BUN mg/dL 24 19   CREATININE mg/dL 1.26 1.28   CALCIUM mg/dL 8.4 9.1             Results from last 7 days   Lab Units 05/22/25  0626 05/21/25  2045 05/21/25  1620   POC GLUCOSE mg/dl 193* 264* 208*        Imaging Results Review: No pertinent imaging studies reviewed.  Other Study Results Review: No additional pertinent studies reviewed.    Review of Scheduled Meds: Medications  reviewed and reconciled as needed  Current Facility-Administered Medications   Medication Dose Route Frequency Provider Last Rate    acetaminophen  650 mg Oral Q6H PRN Crispin Arana MD      allopurinol  300 mg Oral Daily Crispin Arana MD      alteplase  2 mg Intracatheter Q1MIN PRN Crispin Arana MD      alteplase  2 mg Intracatheter Q1MIN PRN Magali Lee MD      alteplase  2 mg Intracatheter Q1MIN PRN Satnam Kebede MD      aluminum-magnesium hydroxide-simethicone  30 mL Oral Q4H PRN Crispin Arana MD      atorvastatin  20 mg Oral Daily Crispin Arana MD      bisacodyl  10 mg Rectal Daily PRN Skyler Hendrickson MD      calcium carbonate  1,000 mg Oral Daily PRN Crispin Arana MD      cefadroxil  1,000 mg Oral Q12H CAIT Lisa Singh MD      chlorhexidine  15 mL Mouth/Throat Q12H CAIT Crispin Arana MD      docusate sodium  100 mg Oral BID Jessica Arreola DO      heparin (porcine)  5,000 Units Subcutaneous Q8H CAIT Crispin Arana MD      insulin lispro  1-5 Units Subcutaneous TID AC PATI Porras      insulin lispro  1-5 Units Subcutaneous HS PATI Porras      lactulose  20 g Oral Daily PRN Joshua Kaminski DO      melatonin  6 mg Oral HS Crispin Arana MD      [Held by provider] metFORMIN  1,000 mg Oral Daily Crispin Arana MD      OLANZapine  5 mg Intramuscular BID PRN Crispin Arana MD      ondansetron  4 mg Intravenous Q6H PRN Crispin Arana MD      oxyCODONE  2.5 mg Oral Q4H PRN Crispin Arana MD      Or    oxyCODONE  5 mg Oral Q4H PRN Crispin Arana MD      pantoprazole  40 mg Oral Early Morning Crispin Arana MD      polyethylene glycol  17 g Oral Daily PRN Skyler Hendrickson MD      QUEtiapine  12.5 mg Oral Daily Crispin Arana MD      QUEtiapine  50 mg Oral HS Crispin Arana MD      senna  2  tablet Oral Daily With Lunch Jessica Arreola,       sitaGLIPtin  50 mg Oral Daily PATI Porras      sodium bicarbonate 150 mEq in dextrose 5 % 1,000 mL infusion  100 mL/hr Intravenous Continuous Johnny Naki  mL/hr (05/22/25 0107)    sodium chloride  20 mL/hr Intravenous Once PRN Satnam Kebede MD         VTE Pharmacologic Prophylaxis: HSQ  Code Status: Level 1 - Full Code  Current Length of Stay: 15 day(s)    Administrative Statements     ** Please Note:  voice to text software may have been used in the creation of this document. Although proof errors in transcription or interpretation are a potential of such software**

## 2025-05-22 NOTE — ASSESSMENT & PLAN NOTE
"Patient with development of worsening confusion, and was seen in the ED on 3/24/2025.  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  Lymphoma/leukemia panel was nondiagnostic  CTH 4/3/25: concerning for worsening hydrocephalus.   Repeat LP 4/7/25 = undiagnostic again   MRI brain 4/11/25: \"Interval enlargement of the dominant left anterior basal ganglionic mass lesion with greater surrounding vasogenic edema and mass effect. 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  S/p EVD, self removed 4/26  S/p Keppra 500mg BID x 7 days for postoperative seizure ppx   Started on chemotherapy during hospital stay and is for weekly Rituximab and Methotrexate every 2 weeks  Continue Dexamethasone taper = LD was on 5/19/25  Maintain PICC line  Had Rituxan 5/10/25  Did get Filgrastim 5/17 and 5/18  Was due for Methotrexate on 5/17 but + sepsis w/bacteremia.   ID cleared pt to get chemo and he did get the Methotrexate 5/21/25 and Decadron 10 mg IV x 1, Zofran 16mg IV  Renal maintaining bicarb drip and pt getting q6hr UAs for urine pH.    To get Leucovorin 24 hr post Methotrexate (will be given this evening)  To get Methotrexate levels until <0.10  For daily CBC, CMP  Pt has an appt with Dr Phan on 6/3/25   "

## 2025-05-22 NOTE — ASSESSMENT & PLAN NOTE
Baseline creatinine 0.8 to 1.1.   Etiology suspected to be due to prerenal azotemia/possible early ATN  Peak SCr 2.77 on 4/22/25.   Renal function improved and currently stable at creatinine 1.26 mg/dL, electrolytes are stable, urine pH 7.8 today.  Patient's status post 6000 mg methotrexate on 5/21.  So far renal function stable  Continue sodium bicarbonate drip at 100 mL/h  Avoid nephrotoxins

## 2025-05-22 NOTE — ASSESSMENT & PLAN NOTE
Diagnosed by LP, brain biopsy.  Complicated by obstructive hydrocephalus, status post EVD 4/13 through 4/19.  Status post Rituxan, high-dose methotrexate and intrathecal cytarabine on 4/17.  Received repeat dose of Rituxan on 5/10, MTX 5/21.  Now off decadron.

## 2025-05-22 NOTE — PROGRESS NOTES
"   05/22/25 1230   Pain Assessment   Pain Score No Pain   Restrictions/Precautions   Precautions Cognitive;Fall Risk;1:1;Supervision on toilet/commode;Multiple lines;Limb alert  (chemo precautions , L UE IV which was active during session)   Cognition   Arousal/Participation Cooperative;Responsive  (sleepy)   Attention Attends with cues to redirect   Memory Decreased long term memory;Decreased recall of biographical information;Decreased short term memory;Decreased recall of recent events;Decreased recall of precautions  (not oriented to place, date and time)   Following Commands Follows one step commands without difficulty   Subjective   Subjective \"I am really tired today\"   Roll Left and Right   Type of Assistance Needed Set-up / clean-up   Roll Left and Right CARE Score 5   Sit to Lying   Type of Assistance Needed Supervision   Sit to Lying CARE Score 4   Lying to Sitting on Side of Bed   Type of Assistance Needed Supervision   Comment level bed with no rails   Lying to Sitting on Side of Bed CARE Score 4   Sit to Stand   Type of Assistance Needed Supervision   Comment no AD   Sit to Stand CARE Score 4   Bed-Chair Transfer   Type of Assistance Needed Supervision   Comment no AD   Chair/Bed-to-Chair Transfer CARE Score 4   Transfer Bed/Chair/Wheelchair   Adaptive Equipment None   Car Transfer   Type of Assistance Needed Supervision;Verbal cues   Car Transfer CARE Score 4   Walk 10 Feet   Type of Assistance Needed Supervision   Comment S with no AD   Walk 10 Feet CARE Score 4   Walk 50 Feet with Two Turns   Type of Assistance Needed Supervision   Comment no AD   Walk 50 Feet with Two Turns CARE Score 4   Walk 150 Feet   Type of Assistance Needed Supervision   Comment no AD   Walk 150 Feet CARE Score 4   Walking 10 Feet on Uneven Surfaces   Type of Assistance Needed Supervision   Comment CS across floor mat with no AD   Walking 10 Feet on Uneven Surfaces CARE Score 4   Ambulation   Primary Mode of Locomotion Prior " "to Admission Walk   Distance Walked (feet) 500 ft  (150 feet, shorter distances for task)   Assist Device Other  (no AD)   Gait Pattern Slow Vicki;Improper weight shift   Walk Assist Level Supervision;Close Supervision   Does the patient walk? 2. Yes   Wheel 50 Feet with Two Turns   Reason if not Attempted Activity not applicable   Wheel 50 Feet with Two Turns CARE Score 9   Wheel 150 Feet   Reason if not Attempted Activity not applicable   Wheel 150 Feet CARE Score 9   Wheelchair mobility   Does the patient use a wheelchair? 0. No   Curb or Single Stair   Style negotiated Curb   Type of Assistance Needed Incidental touching;Supervision   Comment 6\" curb step with no AD   1 Step (Curb) CARE Score 4   4 Steps   Type of Assistance Needed Supervision   Comment S using R rail, reciprocal pattern   4 Steps CARE Score 4   12 Steps   Type of Assistance Needed Supervision   Comment S using R rail, reciprocal pattern   12 Steps CARE Score 4   Stairs   Type Stairs   # of Steps 12  ( steps due to active IV at this time)   Weight Bearing Precautions Fall Risk   Assist Devices Single Rail   Picking Up Object   Type of Assistance Needed Supervision   Comment directly picking up marker from the floor   Picking Up Object CARE Score 4   Therapeutic Interventions   Other OUTCOME MEASURES: 5XSTS: 23.13 SECS with UE support , Gait speed: self selected speed: .59 m/ sec , fast velocity .68 m/sec, fatigue might be a contributing factor to this   Assessment   Treatment Assessment Pt. seen from 50 mins session and was able to participate in above activities mostly  re assessing all care scores as well as outcome measures as above. Pt. reported that he  feels really tired today and might be from re starting his chemo. Had pt. lay at mat to assess bed mobility and fell asleep. Had pt. take a short nap and will cont with PT after. Pt. S/CS level with all functional mobility with no AD. However, he will cont to use RW at home for " safety and FT were completed with pt. using RW mostly.   Problem List Decreased strength;Decreased endurance;Impaired balance;Decreased mobility;Decreased coordination;Decreased cognition;Impaired judgement;Decreased safety awareness   Barriers to Discharge   (medical complexity)   PT Barriers   Functional Limitation Car transfers;Stair negotiation;Standing;Transfers;Walking   Plan   Treatment/Interventions Functional transfer training;LE strengthening/ROM;Elevations;Therapeutic exercise;Endurance training;Patient/family training;Equipment eval/education;Bed mobility;Gait training   Discharge Recommendation   Rehab Resource Intensity Level, PT   (24/7 S, home health PT)   Equipment Recommended   (RW for safety)   Discharge Summary D/c pending medical clearance   PT Therapy Minutes   PT Time In 1230   PT Time Out 1320   PT Total Time (minutes) 50   PT Mode of treatment - Individual (minutes) 50   PT Mode of treatment - Concurrent (minutes) 0   PT Mode of treatment - Group (minutes) 0   PT Mode of treatment - Co-treat (minutes) 0   PT Mode of Treatment - Total time(minutes) 50 minutes   PT Cumulative Minutes 911   Therapy Time missed   Time missed? No

## 2025-05-22 NOTE — PROGRESS NOTES
Progress Note - PMR   Name: Alexis Dennison 64 y.o. 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  > Started on high dose steroids and methotrexate every 2 weeks for 4 weeks (C1 received 4/18, C2 received 5/2) then maintenance every 4 weeks with Rituximab weekly for 8 weeks (1st dose Thursday 4/24 and second 5/3)  > Admitted to [facility] with ongoing cognitive deficits and delirium  > Completed Decadron taper on 5/19    - MTX + Rituximab restarted on 5/21 with bicarb drip, managed by Onc/Nephro  - Leucovorin tonight with monitoring of MTX level tomorrow per IM  - Onc to set-up outpatient Rituximab infusions for after discharge  - Daily methotrexate levels  - Hem/Onc following  - NSGY following  - SLP for cognition  Type 2 diabetes mellitus with hyperglycemia, without long-term current use of insulin (HCC)  Lab Results   Component Value Date    HGBA1C 6.6 (H) 02/22/2025       Recent Labs     05/21/25  1024 05/21/25  1620 05/21/25  2045 05/22/25  0626   POCGLU 165* 208* 264* 193*       Blood Sugar Average: Last 72 hrs:  (P) [id number]    - Metformin 1000mg daily restarted today 5/22  - Lantus 10u daily with Lispro 8u TID and SSI  - Monitor accuchecks while on steroids  - Management per IM  HTN, goal below 130/80  > Blood pressure controlled off antihypertensives    - Monitor VS  - Management per IM  Mixed hyperlipidemia  -  Continue atorvastatin 10 mg daily  Thyroid nodule  Outpatient endocrine follow-up needed  Pulmonary nodule  > CTA 3/29: nonspecific 4 mm RLL pulmonary nodule   - follow up outpatient for repeat imaging in 3 months  Liver lesion  > CTA 3/29- nonspecific 12mm hypodense right hepatic lesion, recommended MRI abdomen  > MRI abdomen 3/30- simple right hepatic lobe cyst    - Follow up outpatient for monitoring  Ambulatory dysfunction  - Fall precautions  - PT/OT  Encephalopathy  > Patient has been pleasantly confused for months in the setting of brain mass.  > acutely worsened due to UTI and bacteremia. S/p 7 day course of antibx  > Continues to lack insight to his current situation and is severely cognitively impaired.  He does not get agitated for long periods of time and is mostly confused and impulsive.  > A&O 1 on [facility] admission    - Continue seroquel 12.5mg at noon with 50mg HS  - PRN Zyprexa 2.5mg IM as needed for agitation  -Overstimulation precautions, frequent re-orientation, re-direction, re-assurance  -Sleep log and agitation monitoring if needed  -Optimize sleep-wake cycle  -Limit sedating medications when possible  - Ensure optimal management electrolytes, nutrition, and hydration  - Ensure optimal bowel/bladder management  - Ensure optimal pain management   -Patient/family/caregiver education and training   -Continue 1:1 due to overall safety, impulsivity, lack of safety awareness and poor insight.  -Fall precautions with frequent rounding; proactive toileting program, patient should not be unattended in bathroom      LETY (acute kidney injury) (HCC)  > Baseline Cr 0.9  > Recently 1.3 improved from before- peak 2.7  > Now s/p bicarb    - IVF per IM/Nephro  - Management at discretion of nephro   E. coli UTI  > Pet met sepsis criteria on 4/29 with confusion and agitation. UA was positive. Urine culture and blood cultures showed Ecoli sensitive to cephalosporins.  > ID consulted and pt completed 7d course of  ceftriaxone  > Approved to restart chemo 5/2.  > Chemo on hold for GNR UTI and bacteremia > restarted chemo on 5/21    - Monitor while on antibiotics  Impaired mobility and activities of daily living  Patient was evaluated by the rehabilitation team MD and an appropriate candidate for acute inpatient rehabilitation program at this time.  The patient will tolerate 3 hours/day 5 to 7 days/week of intensive physical, occupational in order to obtain goals for community discharge  Due to the patient's functional Compared to their baseline level of function in addition to their ongoing medical needs, the patient would benefit from daily supervision from a rehabilitation physician as well as rehabilitation nursing to implement and adjust the medical as well as functional plan of care in order to meet the patient's goals.  HTN (hypertension)  Had been on losartan but holding at this time- his blood pressures are good  Transaminitis  CMP checked on 5/9 and AST is down to 57 from 111 and ALT to 281 from 322  5/19 labs: AST 11 and ALT 25.  5/22- Labs stable: AST 16 and ALT 32  Hyponatremia  Sodium stable at 140 on 5/19 labs  5/22- Na 138  Thrombocytopenia (HCC)  5/19- Platelets down to 79K- will need to continue monitoring with chemotherapy now on hold  5/22- Plts improved to 145K  Sepsis (HCC)  5/19- Sepsis alert over the weekend.  CT A/P showed perinephric stranding. UA abnormal and Ucx grew GNR in urine. Blood cultures also + for GNR. Lactate 4.4>5.1. procal 7.39> 6.55. Started on IV antibiotics and Chemo on hold per Onc  5/22- Cleared by ID to restart chemo. Transitioned to PO antibiotics  - Continue antibiotics  - Monitor WBC on routine labs  - Follow temp curve  Recurrent E. coli bacteremia  5/20- Antibx changed from cefepime to ceftriaxone per ID recommendations. Need to follow up final susceptibilities and adjust antibiotics accordingly  Now on oral Duricef, no longer on IV medications  Leukopenia  5/19- WBC improving.  Now 3.93 from 1.84 off chemo  5/22- WBC stable at 9.85  Anemia  5/19 - hemoglobin of 8.0 - improved from 6.7 after transfusion of 1u pRBC over the weekend  5/22- Hgb 8.9  -- down from 10.3 yesterday  At risk for acid-base imbalance  Urine pH testing and sodium bicarb drip with MTX treatments > restarted now on bicarb 100 ml/hr  Electrolyte imbalance risk    SIRS (systemic inflammatory response syndrome) (HCC)      Subjective   64-year-old male with history of hyperlipidemia hypertension, GERD, type 2 diabetes, NAFLD and sleep apnea presenting on 3/29 for increased confusion. Recently diagnosed with brain mass and had worsening of symptoms and had to come to the hospital and was found to have findings consistent with CNS lymphoma and is under treatment with methotrexate and Rituxan as well as steroids. Steroid course has overall completed and his course was complicated by LETY, delirium and agitation as well as an E. coli UTI with bacteremia status post treatment antibiotics     Chief Complaint: f/u ambulatory dysfunction    Interval: Patient seen and examined. No acute overnight events. Advised patient that dispo is pending Hem/Onc recs given that MTX was just restarted. Pt is understanding of the plan. Otherwise, denies any fevers, chills, chest pain, sob, abdominal pain, nausea/vomiting, lightheadedness or dizziness. Eating 100% of his meals. Last BM 5/20 and continent. Voiding spontaneously and continent. Labs today reviewed and notable for: Hgb 8.9, Plt 145, CO2 33, albumin 3.2, total protein 5.3 and UA with glucose and mucus.      Objective :  Temp:  [97.5 °F (36.4 °C)-98 °F (36.7 °C)] 97.5 °F (36.4 °C)  HR:  [64-79] 67  BP: (115-148)/(73-78) 118/74  Resp:  [17-18] 17  SpO2:  [96 %-97 %] 97 %  O2 Device: None (Room air)    Functional Update:  Participated in interdisciplinary team meeting today. Patient has progressed to a supervision level with and without an AD. Wife has been present for recent sessions and  participated in training without any issues. Dispo pending final plan from Hem/Onc   Mobility: supervision bed mobility, supervision without AD for ambulation 150'  Transfers: supervision-CGA without AD  ADLs: setup eating, sup oral care/UBD/footwear/toileting, CGA bathing/LBD    Physical Exam  Vitals reviewed.   Constitutional:       Appearance: Normal appearance.   HENT:      Head: Normocephalic and atraumatic.      Right Ear: External ear normal.      Left Ear: External ear normal.      Nose: Nose normal.      Mouth/Throat:      Mouth: Mucous membranes are moist.      Pharynx: Oropharynx is clear.     Eyes:      Conjunctiva/sclera: Conjunctivae normal.       Cardiovascular:      Rate and Rhythm: Normal rate and regular rhythm.      Pulses: Normal pulses.      Heart sounds: Normal heart sounds.   Pulmonary:      Effort: Pulmonary effort is normal. No respiratory distress.      Breath sounds: Normal breath sounds. No wheezing.   Abdominal:      General: There is no distension.      Palpations: Abdomen is soft.     Musculoskeletal:      Cervical back: Normal range of motion.     Skin:     General: Skin is warm and dry.     Neurological:      Mental Status: He is alert. Mental status is at baseline.     Psychiatric:         Mood and Affect: Mood normal.         Behavior: Behavior normal.           Scheduled Meds:  Current Facility-Administered Medications   Medication Dose Route Frequency Provider Last Rate    acetaminophen  650 mg Oral Q6H PRN Crispin Arana MD      allopurinol  300 mg Oral Daily Crispin Arana MD      alteplase  2 mg Intracatheter Q1MIN PRN Crispin Arana MD      alteplase  2 mg Intracatheter Q1MIN PRN Magali Lee MD      alteplase  2 mg Intracatheter Q1MIN PRN Satnam Kebede MD      aluminum-magnesium hydroxide-simethicone  30 mL Oral Q4H PRN Crispin Arana MD      atorvastatin  20 mg Oral Daily Crispin Arana MD      bisacodyl  10 mg Rectal Daily PRN Skyler Hendrickson MD      calcium carbonate   1,000 mg Oral Daily PRN Crispin Arana MD      cefadroxil  1,000 mg Oral Q12H [facility] Lisa Singh MD      chlorhexidine  15 mL Mouth/Throat Q12H [facility] Crispin Arana MD      docusate sodium  100 mg Oral BID Jessica Arreola, DO      heparin (porcine)  5,000 Units Subcutaneous Q8H [facility] Crispin Arana MD      insulin lispro  1-5 Units Subcutaneous TID AC PATI [place]      insulin lispro  1-5 Units Subcutaneous HS PATI [place]      lactulose  20 g Oral Daily PRN Joshua Kaminski DO      melatonin  6 mg Oral HS Crispin Arana MD      [Held by provider] metFORMIN  1,000 mg Oral Daily Crispin Arana MD      OLANZapine  5 mg Intramuscular BID PRN Crispin Arana MD      ondansetron  4 mg Intravenous Q6H PRN Crispin Arana MD      oxyCODONE  2.5 mg Oral Q4H PRN Crispin Arana MD      Or    oxyCODONE  5 mg Oral Q4H PRN Crispin Arana MD      pantoprazole  40 mg Oral Early Morning Crispin Arana MD      polyethylene glycol  17 g Oral Daily PRN Skyler Hendrickson MD      QUEtiapine  12.5 mg Oral Daily Crispin Arana MD      QUEtiapine  50 mg Oral HS Crispin Arana MD      senna  2 tablet Oral Daily With Lunch Jessica Arreola, DO      sitaGLIPtin  50 mg Oral Daily PATI [place]      sodium bicarbonate 150 mEq in dextrose 5 % 1,000 mL infusion  100 mL/hr Intravenous Continuous Johnny Naik  mL/hr (05/22/25 0107)    sodium chloride  20 mL/hr Intravenous Once PRN Satnam Kebede MD           Lab Results: I have reviewed the following results:  Results from last 7 days   Lab Units 05/22/25  0613 05/21/25  1033 05/19/25  0559   HEMOGLOBIN g/dL 8.9* 10.3* 8.0*   HEMATOCRIT % 25.7* 32.0* 23.7*   WBC Thousand/uL 9.85 9.02 3.93*   PLATELETS Thousands/uL 145* 144* 79*     Results from last 7 days   Lab Units 05/22/25  0613 05/21/25  1033 05/20/25  0541 05/19/25  0559   BUN mg/dL 24 19 19 19   SODIUM mmol/L 138 142 141 140   POTASSIUM mmol/L 4.0 3.8 3.5 3.9   CHLORIDE mmol/L 100 105 106 107    CREATININE mg/dL 1.26 1.28 1.20 1.20   AST U/L 16 24  --  11*   ALT U/L 32 40  --  25

## 2025-05-22 NOTE — ASSESSMENT & PLAN NOTE
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  > Started on high dose steroids and methotrexate every 2 weeks for 4 weeks (C1 received 4/18, C2 received 5/2) then maintenance every 4 weeks with Rituximab weekly for 8 weeks (1st dose Thursday 4/24 and second 5/3)  > Admitted to [facility] with ongoing cognitive deficits and delirium  > Completed Decadron taper on 5/19    - MTX + Rituximab restarted on 5/21 with bicarb drip, managed by Onc/Nephro  - Leucovorin tonight with monitoring of MTX level tomorrow per IM  - Onc to set-up outpatient Rituximab infusions for after discharge  - Daily methotrexate levels  - Hem/Onc following  - NSGY following  - SLP for cognition

## 2025-05-22 NOTE — ASSESSMENT & PLAN NOTE
5/19- Sepsis alert over the weekend.  CT A/P showed perinephric stranding. UA abnormal and Ucx grew GNR in urine. Blood cultures also + for GNR. Lactate 4.4>5.1. procal 7.39> 6.55. Started on IV antibiotics and Chemo on hold per Onc  5/22- Cleared by ID to restart chemo. Transitioned to PO antibiotics  - Continue antibiotics  - Monitor WBC on routine labs  - Follow temp curve

## 2025-05-22 NOTE — ASSESSMENT & PLAN NOTE
CMP checked on 5/9 and AST is down to 57 from 111 and ALT to 281 from 322  5/19 labs: AST 11 and ALT 25.  5/22- Labs stable: AST 16 and ALT 32

## 2025-05-22 NOTE — PROGRESS NOTES
Progress Note - Infectious Disease   Name: Alexis Dennison 64 y.o. male I MRN: [id number]  Unit/Bed#: -01 I Date of Admission: 5/7/2025   Date of Service: 5/22/2025 I Hospital Day: 15    Assessment & Plan  Sepsis ([facility])  HR, leukopenia.  Due to bacteremia as below.  No other appreciable source.  LFTs, CT A/P otherwise unremarkable.  Improving with antibiotics.    Continue antibiotics as below  Follow temperatures closely  Supportive care as per the primary service  Recurrent E. coli bacteremia  Recent positive blood culture on 4/29 due to UTI versus PICC, status post 7 days of IV antibiotic thru 5/5.  Now with recurrence due to UTI given positive urine culture, increased perinephric stranding seen on CT.  Consider PICC infection, less likely.    Continue Duricef through 5/30 for 14 days total antibiotics  OK to keep PICC  Primary central nervous system (CNS) lymphoma  Diagnosed by LP, brain biopsy.  Complicated by obstructive hydrocephalus, status post EVD 4/13 through 4/19.  Status post Rituxan, high-dose methotrexate and intrathecal cytarabine on 4/17.  Received repeat dose of Rituxan on 5/10, MTX 5/21.  Now off decadron.    Type 2 diabetes mellitus with hyperglycemia, without long-term current use of insulin ([facility])  Lab Results   Component Value Date    HGBA1C 6.6 (H) 02/22/2025   Relatively well-controlled, though remains a risk factor for infection.    Ok for discharge from Infectious Disease service perspective.    Antibiotics:  Duricef #3  Antibiotics #6    Subjective   Patient sleeping and not awakened.  Got MTX yesterday.      Objective :  Temp:  [97.5 °F (36.4 °C)-98 °F (36.7 °C)] 97.5 °F (36.4 °C)  HR:  [64-79] 67  BP: (115-148)/(73-78) 118/74  Resp:  [17-18] 17  SpO2:  [96 %-97 %] 97 %  O2 Device: None (Room air)    General:  No acute distress  Psychiatric:  Sleeping  Pulmonary:  Normal respiratory excursion without accessory muscle use  Abdomen:  Soft, nontender  Extremities:  No edema  Skin:  No  jayla      Lab Results: I have reviewed the following results:  Results from last 7 days   Lab Units 05/22/25  0613 05/21/25  1033 05/19/25  0559   WBC Thousand/uL 9.85 9.02 3.93*   HEMOGLOBIN g/dL 8.9* 10.3* 8.0*   PLATELETS Thousands/uL 145* 144* 79*     Results from last 7 days   Lab Units 05/22/25  0613 05/21/25  1033 05/20/25  0541 05/19/25  0559   SODIUM mmol/L 138 142 141 140   POTASSIUM mmol/L 4.0 3.8 3.5 3.9   CHLORIDE mmol/L 100 105 106 107   CO2 mmol/L 33* 27 29 27   BUN mg/dL 24 19 19 19   CREATININE mg/dL 1.26 1.28 1.20 1.20   EGFR ml/min/1.73sq m 59 58 63 63   CALCIUM mg/dL 8.4 9.1 8.3* 7.9*   AST U/L 16 24  --  11*   ALT U/L 32 40  --  25   ALK PHOS U/L 58 70  --  55   ALBUMIN g/dL 3.2* 3.5  --  2.8*     Results from last 7 days   Lab Units 05/17/25  1410 05/17/25  1217   BLOOD CULTURE   --  Escherichia coli*  Escherichia coli*   GRAM STAIN RESULT   --  Gram negative rods*  Gram negative rods*   URINE CULTURE  >100,000 cfu/ml Escherichia coli*  --      Results from last 7 days   Lab Units 05/18/25  0543 05/17/25  1217   PROCALCITONIN ng/ml 6.55* 7.39*

## 2025-05-22 NOTE — ASSESSMENT & PLAN NOTE
5/20- Antibx changed from cefepime to ceftriaxone per ID recommendations. Need to follow up final susceptibilities and adjust antibiotics accordingly  Now on oral Duricef, no longer on IV medications

## 2025-05-22 NOTE — ASSESSMENT & PLAN NOTE
64 yoM with PMHx of DM2, NAFLD, and HT who presented with progressive encephalopathy found to have multiple scattered nodular cerebral and cerebellar enhancement who underwent two non-diagnostic LPs prompting stereotactic brain biopsy (4/14) which showed large B-cell lymphoma. See oncology progress note on 4/21 for full diagnostic work up. On 4/29, agitation led to infectious work up with showed UA+ and BCx with 2/2 E. Coli sensitive to ceftriaxone. ID was consulted is managing and has approved to restart chemo 5/2.      Treatment Plan: regimen modified from (https://pubmed.ncbi.nlm.nih.gov/92446032/)     High-dose methotrexate every 2 weeks x4 cycles followed by every 4 weeks maintenance (could consider dosing every 4 weeks if he discharges to outside rehab)  C1 (4/17) 8 g/m²  C2 (5/2) 3 g/m², dose-reduced due to LETY, delayed x1 day due to E. coli bacteremia.  C3 (was planned for 5/17) delayed due to E. coli bacteremia, s/p MTX on 5/21.  Urine pH remained above 7.0.  Will need to monitor methotrexate level 24 hours post administration.  Patient will be started on leucovorin rescue 24-hour post methotrexate.  Will continue to monitor urine pH and methotrexate level daily and continue leucovorin rescue until methotrexate levels are less than 0.10.  Patient had outpatient appointment with Dr. Phan on 5/23 that will be moved given delay in his treatment and discharge.  Continue to monitor CBC and CMP daily until methotrexate levels cleared.

## 2025-05-22 NOTE — TEAM CONFERENCE
Acute RehabilitationTeam Conference Note  Date: 5/22/2025   Time: 10:47 AM       Patient Name:  Alexis Dennison       Medical Record Number: [id number]   YOB: 1960  Sex: Male          Room/Bed:  [facility]4/[facility] 964-01  Payor Info:  Payor: SAM CROSS / Plan: MISC BLUE CROSS / Product Type: Blue Fee for Service /      Admitting Diagnosis: Lymphoma (HCC) [C85.90]   Admit Date/Time:  5/7/2025  6:13 PM  Admission Comments: No comment available     Primary Diagnosis:  Primary central nervous system (CNS) lymphoma  Principal Problem: Primary central nervous system (CNS) lymphoma    Patient Active Problem List    Diagnosis Date Noted    SIRS (systemic inflammatory response syndrome) ([facility]) 05/17/2025    Anemia 05/15/2025    Thrombocytopenia ([facility]) 05/12/2025    Hyponatremia 05/10/2025    Impaired mobility and activities of daily living 05/08/2025    HTN (hypertension) 05/08/2025    Transaminitis 05/08/2025    Leukopenia 05/06/2025    At risk for acid-base imbalance 05/06/2025    Electrolyte imbalance risk 05/06/2025    E. coli UTI 05/01/2025    Sepsis ([facility]) 04/29/2025    Recurrent E. coli bacteremia 04/29/2025    Metabolic alkalosis 04/24/2025    LETY (acute kidney injury) ([facility]) 04/21/2025    Goals of care, counseling/discussion 04/16/2025    Palliative care encounter 04/16/2025    Primary CNS lymphoma 04/16/2025    Encephalopathy 04/02/2025    Ambulatory dysfunction 04/01/2025    Thyroid nodule 03/30/2025    Pulmonary nodule 03/30/2025    Liver lesion 03/30/2025    Primary central nervous system (CNS) lymphoma 03/29/2025    Type 2 diabetes mellitus with hyperglycemia, without long-term current use of insulin (HCC) 06/14/2022    HTN, goal below 130/80 06/14/2022    Mixed hyperlipidemia 06/14/2022    Vitamin D deficiency 06/14/2022    NAFLD (nonalcoholic fatty liver disease) 06/14/2022       Physical Therapy:    Weight Bearing Status: Full Weight Bearing  Transfers: Supervision  Bed Mobility: Supervision  Amulation  Distance (ft): 250 feet  Ambulation: Supervision  Assistive Device for Ambulation: Roller Walker (and with no AD)  Number of Stairs: 12  Assistive Device for Stairs: Right Hand Rail  Stair Assistance: Supervision  Ramp: Incidental Touching  Assistive Device for Ramp: None  Discharge Recommendations: Home with:  DC Home with:: 24 Hour Supervision    Alexis is a 65 y/o male with PMH including DM, HLD, HTN, GERD, liver disease and sleep apnea, who presented to hospital on 3/29 for increased confusion. 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         5/12:Pt cont to be limited by poor safety awareness, decreased cognition, decreased processing, decreased caregiver support and poor righting reactions. Pt remains a high fall risk 2/2 to this limitations. Pt was able to maintain CS/CGA with gait, CGA on a FF with single HR, S with STS transfers and S with bed mobility. Pt requires constant VC's for direction and task management 2/2 poor cognition and decreased carryover. Pt is recommended 24 S at home 2/2 decreased cognition and current changes in pt's abilities. Pt will cont POC as tolerated with cont focus on gait, balance, coordination, duel tasking, righting reactions and stair management to decrease burden of care and decrease fall risk. Currently planning on family/team meeting 5/14 at 10am to review d/c planning with family.     5/15: Alexis cont to fluctuate between CS-Maddy level based upon variations in fatigue and cognition. He cont to have deficits listed above with sequencing, problem solving, executive function, language, balance and righting reactions, therefore cont to recommend CS-Maddy at home. Family meeting was held today with pt's wife and his dtr and current plan  is to do FT with wife this Saturday afternoon and she will stay over to see what  care he needs over night. RN is aware of this plan to provide edu to her over night as needed. Based upon FT and how much more is needed, planning on dc home sometime next week. Recommending he is not home alone due to cognitive deficits. Family was asking about DME; will trial with rollator to see how he does with this device to provide appropriate recommendations for home.     5/21 pt has progressed t a S level with and without an AD. ( RW) He is currently doing steps with one rail  with S - wife has been present for  most recent sessions and has participated  ( in training and hands on)  in  walking and steps with no issues . Rec home therapy  and 24/7 S /A  by wife  ( unsure of extra  support as  wife may need time out of the house )   as per  therapy status -agreeable to  dc in  next couple of days     Occupational Therapy:  Eating:  (setup)  Grooming: Supervision  Bathing: Supervision, Incidental Touching  Bathing: Supervision, Incidental Touching  Upper Body Dressing: Supervision  Lower Body Dressing: Supervision  Toileting: Supervision, Incidental Touching  Tub/Shower Transfer: Incidental Touching  Toilet Transfer: Incidental Touching  Cognition: Exceptions to WNL  Cognition: Decreased Memory, Decreased Comprehension, Decreased Safety, Decreased Executive Functions, Decreased Attention  Orientation: Person, Place  Discharge Recommendations: Home with:  DC Home with:: 24 Hour Assistance, 24 Hour Supervision, First Floor Setup, Family Support, Home Occupational Therapy       05/12/25  Pt is demonstrating fair progress with occupational therapy and is progressing toward long term goals for ADL, IADL, and functional transfers/mobility. Pts long term goals for ADLs are sup w/ no assistive device. Pt is currently Partial/moderate assistance  for ADLs. Pt continues to present with impairments in activity tolerance, endurance, standing  balance/tolerance, UE strength, memory, insight, safety , judgement , attention , sequencing , task initiation , and task termination . Occupational performance remains limited by fatigue, impulsivity, decreased caregiver support, risk for falls, and home environment. Family training/education will be required prior to D/C. Pt will continue to benefit from skilled acute rehab OT services to address above mentioned barriers and maximize functional independence in baseline areas of occupation to meet established treatment goals with overall decreased burden of care. Plan of care to continue to focus on ADL Retraining , LB Dressing, UB dressing, Functional Transfers, Functional Cognition, Functional Attention, Standing tolerance, Standing balance , Gross motor strengthening , Family training/education, Energy conservation training/education, healthy coping education, Leisure and social pursuits, and community re-integration. Goals for the upcoming week are: setup FT w/ family and explain reasoning for 24/7 SUP level rec for home    Anticipate Discharge date to be set.     05/21/25  Pt is demonstrating good progress with occupational therapy and is progressing toward long term goals for ADL, IADL, and functional transfers/mobility. Pts long term goals for ADLs are SUP with Rolling Walker and no assistive device. Pt is currently Supervision or touching assistance-  for ADLs. Pt continues to present with impairments in activity tolerance, endurance, sitting balance/tolerance, UE strength, arousal, memory, insight, safety , judgement , attention , and sequencing . Occupational performance remains limited by fatigue, pain, impulsivity, decreased caregiver support, and risk for falls. Family training/education already completed prior to D/C. Pt will continue to benefit from skilled acute rehab OT services to address above mentioned barriers and maximize functional independence in baseline areas of occupation to meet  established treatment goals with overall decreased burden of care. Plan of care to continue to focus on ADL Retraining , LB Dressing, UB dressing, Functional Transfers, Functional Cognition, Functional Attention, Standing tolerance, Standing balance , Gross motor strengthening , Energy conservation training/education, healthy coping education, Leisure and social pursuits, and community re-integration.     DC pending medical stability      Speech Therapy:  Mode of Communication: Verbal  Speech/Language:  (word finding difficulty)  Cognition: Exceptions to WNL  Cognition: Decreased Memory, Decreased Executive Functions, Decreased Attention, Decreased Comprehension, Decreased Safety  Orientation: Person  Discharge Recommendations: Home with:  DC Home with:: 24 Hour Supervision, 24 Hour Assisteance, Family Support, Home Speech Therapy, Outpatient Speech Therapy  Pt currently being followed for cognitive linguistic tx sessions. Upon admission to the unit, pt was demonstrating fairly significant difficulty in providing own LT biographical information to where the Informal Cognitive Assessment was initiated only. Pt is demonstrating severely impaired deficits noted in the following areas: decreased attention, LT memory recall, ST memory recall , problem solving, reasoning, sequencing, organization of thoughts, judgement, slower processing, insight, and as well as likely deficits in language skills. Pt's overall attention towards tasks did fluctuate throughout assessment, but able to be redirected to tasks at hand. Throughout orientation and LT memory biographical review, pt exhibiting more deficits given cognitive skills, but when initiating more structured tasks, pt demonstrating more word finding deficits, as well as perseverations on words provided. Question true language skills vs cognitive deficits, most likely combination of both, currently impacting pt's overall cognitive functioning. In order to address the noted  barriers, skilled SLP services will address this by targeting the following interventions: visual orientation checklist, memory book/memory packet, verbal problem solving task, visual memory recall tasks, drawing conclusions activities, written sequencing tasks, categorization tasks, picture problem solving activities, functional reading tasks, word deduction tasks and family education/training.     Current level of functioning:   Comprehension: mod A  Expression: mod A  Social Interaction: supervision-min A  Executive functions: total A  Memory: total A    Family training/education has been initiated with pt's spouse, Naldo very briefly but also children were present to provide initial education given role of services as well as skills being targeted. There will be a plan for family meeting later to d/w family about the recommendations for the need given 24/7 supervision/assistance at times of discharge due to the significant deficits of cognitive skills at this time. Recommendations at time of discharge are for continued skilled ST services pending disposition home vs OP.    This week, focus for continued services to target reorientation skills to current events, review of LTM biographical information, memory recall strategies, basic categorization tasks, basic problem solving skills. At this time, pt will continue to benefit from skilled cognitive linguistic tx session to maximize overall functional independence given overall cognitive linguistic skills in attempts to decreased caregiver burden over time.     Update from week: 5/15/2025. Pt is being followed for cognitive linguistic tx sessions to where pt is making slower and steady progress this week.     Continued cognitive barriers which present include: decreased attention, visual attention, LT memory recall, ST memory recall , problem solving, reasoning, sequencing, organization of thoughts, judgement, slower processing, insight, and as well as word finding  deficits, which still impacts pt's overall safety, functional cognitive communication skills as well as functional mobility. The following interventions are used to target these barriers, including visual orientation checklist, verbal problem solving task, verbal working memory tasks, visual memory recall tasks, drawing conclusions activities, written sequencing tasks, categorization tasks , picture problem solving activities, verbal reasoning tasks, functional reading tasks, word deduction tasks and family education/training.     Current level of functioning:   Comprehension: min-mod A  Expression: min-mod A  Social Interaction: supervision  Executive functions: max-total A  Memory: total A    Family training/education has been initiated and will be ongoing with pt's spouse, Naldo. Spouse was recently in for ST session in which she was able to observe pt's ability to complete structured basic reading comprehension tasks where pt does better vs verbal explanation of information. Will continue to review strategies w/ spouse to continue to maximize pt's cognitive skills as well as overall safety within the home. Recommendations at time of discharge are for continued skilled ST services but TBD if home vs OP services.    This week, focus for continued services to target ongoing review of orientation, LT biographical information, basic problem solving, basic reasoning, functional sequencing tasks, etc. At this time, pt will continue to benefit from skilled cognitive linguistic tx session to maximize overall functional independence given overall cognitive linguistic skills in attempts to decreased caregiver burden over time.     Update from week: 5/21/2025. Pt is being followed for cognitive linguistic tx sessions to where pt is making slower and steady progress this week.     Continued cognitive barriers which present include: decreased attention, visual attention, LT memory recall, ST memory recall , problem solving,  reasoning, sequencing, organization of thoughts, judgement, slower processing, insight, and as well as word finding deficits, which still impacts pt's overall safety, functional cognitive communication skills as well as functional mobility. The following interventions are used to target these barriers, including visual orientation checklist, verbal problem solving task, verbal working memory tasks, visual memory recall tasks, drawing conclusions activities, written sequencing tasks, categorization tasks , picture problem solving activities, verbal reasoning tasks, functional reading tasks, word deduction tasks and family education/training.     Current level of functioning:   Comprehension: min-mod A  Expression: min-mod A  Social Interaction: supervision  Executive functions: max A  Memory: max A    Family training/education has been completed with spouse Naldo. Recommendations for home are for 24hr S/A with all ADL/IADL tasks. Naldo receptive and is taking off work to accommodate. Reviewed memory strategies for home as well as activities to engage pt in which are good for overall cognition. Recommendations at time of discharge are for continued skilled ST services for cognition.    Pt is cleared for discharge pending medical stability. At this time, pt will continue to benefit from skilled cognitive linguistic tx session to maximize overall functional independence given overall cognitive linguistic skills in attempts to decreased caregiver burden over time      Nursing Notes:  Appetite: Good  Diet Type: Diabetic, Thin Liquids                      Diet Patient/Family Education Complete: Yes                         Type of Wound Patient/Family Education: No  Bladder: Continent (inc at times)     Bladder Patient/Family Education: Yes  Bowel: Continent     Bowel Patient/Family Education: Yes  Pain Location/Orientation: Orientation: Right, Location: Knee  Pain Score: 0                       Hospital Pain Intervention(s):  Medication (See MAR)  Pain Patient/Family Education: Yes  Medication Management/Safety  Injectable: Insulin  Safe Administration: No  Reason for non-safe administration: will need assist  Medication Patient/Family Education Complete: No (ongoing)    64 y.o. male with a PMH of hypertension, hyperlipidemia, diabetes who presented to the [facility] primary history of persistent lymphoma.  Patient was just hospitalized for encephalopathy due to primary CNS lymphoma.  Patient on a dexamethasone taper.  Patient received methotrexate during his hospitalization and will need continuing monitoring.  Patient now admitted to inpatient acute rehab.     5/8/25-This week we will continue to monitor patient's labs and vital signs and monitor pain. We will medicate patient for pain as ordered. We will encourage independence with ADL's. We will educate fall prevention and safety precautions. Pt is a 1:1 for cognitive impairment, poor safety and impulsive behavior. We will continue medication education. Monitor and maintain adequate po intake. Turn and reposition Q 2 hour, assess and monitor for skin breakdown.    5/13  LETY is resolved and renal function is stable.Renal signed off.On 5/10/25, increased Lispro WM to 4U TID .BSs should continue to improve with steroid being tapered  Will increase Lispro WM to 6U TID today 5/12/25, Continue DM diet.  This week we will continue to monitor patient's labs and vital signs and monitor pain. We will medicate patient for pain as ordered. We will encourage independence with ADL's. We will educate fall prevention and safety precautions. Pt is a 1:1 for cognitive impairment, poor safety and impulsive behavior. We will continue medication education. Monitor and maintain adequate po intake. Turn and reposition Q 2 hour, assess and monitor for skin breakdown.    5/21- Pt cleared for chemo 5/21.  Had methotrexate as ordered and chemo precautions intitiated.  IVF w NAHCO3  ordered by renal.  Cr improving.  IVAbx changed to po.  Decadron taper completed.  Pt was not on insulin at home.  Insulin w meals d/c and hopefully will resume metformin.  Remains on 1:1 watch for safety.  Is inc urine at times.  Otherwise continent of bowel.    This week we will encourage independence with ADLs.  We will monitor labs and vital signs.  We will educate pt/family about repositioning to prevent skin breakdown.  We will assist w repositioning and perform routine skin checks.  We will monitor for adequate pain control.  We will monitor for constipation and medicate pt as ordered.  We will increase safety awareness and keep pt free from falls.                  Case Management:     Discharge Planning  Living Arrangements: Lives w/ Spouse/significant other  Support Systems: Spouse/significant other, Daughter  Assistance Needed: will need 24/7 Supervision  Type of Current Residence: Private residence  Current Home Care Services: No  Pts dc moved to Friday or Saturday to accommodate chemo treatment which was on hold due to sepsis. Pt is on PO abx and is set up with [facility] for pt ot and speech services. Following to finalize dc plan.     Is the patient actively participating in therapies? yes  List any modifications to the treatment plan:     Barriers Interventions   Sepsis likely from chemo medication Now on po meds                     Is the patient making expected progress toward goals? yes  List any update or changes to goals:     Medical Goals: Patient will be medically stable for discharge to [facility] upon completion of rehab program and Patient will be able to manage medical conditions and comorbid conditions with medications and follow up upon completion of rehab program    Weekly Team Goals:   Rehab Team Goals  ADL Team Goal: Patient will require supervision with ADLs with least restrictive device upon completion of rehab program  Bowel/Bladder Team Goal: Patient will  require supervision with bladder/bowel management with least restrictive device upon completion of rehab program  Transfer Team Goal: Patient will require supervision with transfers with least restrictive device upon completion of rehab program  Locomotion Team Goal: Patient will require supervision with locomotion with least restrictive device upon completion of rehab program  Cognitive Team Goal: Patient will require supervision for basic and complex tasks upon completion of rehab program    Discussion: pt has made gains and family training has been conducted with pts wife as he will require supervision on dc. Pt is using a gait belt to assist if needed in the home. Pts cog insight and memory are biggest barriers in progressing pts functional ability at this time. Pt is to return home w/wife with contd [facility] for pt ot and speech services.     Anticipated Discharge Date:  5/24/25  [facility] Team Members Present:The following team members are supervising care for this patient and were present during this Weekly Team Conference.    Physician: Dr. Yamilex DO  : Pearl Agudelo MSW  Registered Nurse: Aida Buchanan RN  Physical Therapist: Kate Gomez DPT  Occupational Therapist: Jr Montoya MS, OTR/L  Speech Therapist: Kate Aranda MA, CCC-SLP

## 2025-05-22 NOTE — PROGRESS NOTES
Progress Note - Oncology-Medical   Name: Alexis Dennison 64 y.o. male I MRN: [id number]  Unit/Bed#: -01 I Date of Admission: 5/7/2025   Date of Service: 5/22/2025 I Hospital Day: 15     Assessment & Plan  Primary central nervous system (CNS) lymphoma  64 yoM with PMHx of DM2, NAFLD, and HT who presented with progressive encephalopathy found to have multiple scattered nodular cerebral and cerebellar enhancement who underwent two non-diagnostic LPs prompting stereotactic brain biopsy (4/14) which showed large B-cell lymphoma. See oncology progress note on 4/21 for full diagnostic work up. On 4/29, agitation led to infectious work up with showed UA+ and BCx with 2/2 E. Coli sensitive to ceftriaxone. ID was consulted is managing and has approved to restart chemo 5/2.      Treatment Plan: regimen modified from (https://pubmed.ncbi.nlm.nih.gov/30619649/)     High-dose methotrexate every 2 weeks x4 cycles followed by every 4 weeks maintenance (could consider dosing every 4 weeks if he discharges to outside rehab)  C1 (4/17) 8 g/m²  C2 (5/2) 3 g/m², dose-reduced due to LETY, delayed x1 day due to E. coli bacteremia.  C3 (was planned for 5/17) delayed due to E. coli bacteremia, s/p MTX on 5/21.  Urine pH remained above 7.0.  Will need to monitor methotrexate level 24 hours post administration.  Patient will be started on leucovorin rescue 24-hour post methotrexate.  Will continue to monitor urine pH and methotrexate level daily and continue leucovorin rescue until methotrexate levels are less than 0.10.  Patient had outpatient appointment with Dr. Phan on 5/23 that will be moved given delay in his treatment and discharge.  Continue to monitor CBC and CMP daily until methotrexate levels cleared.  Thyroid nodule  Patient incidentally found to have thyroid nodule, was recommended ultrasound thyroid.  Pulmonary nodule  CTA from 3/29/2025 with nonspecific 4 mm right lower lobe pulmonary nodule, recommended 3 months  follow-up.  Transaminitis  Patient noted with transaminitis, LFTs has been fluctuating,   Could be due to recent chemotherapy versus antibiotics versus liver lesions.  CMP from this morning within normal limit.  Thrombocytopenia (HCC)  Patient does have thrombocytopenia at baseline but usually his platelet counts have been above 100 K,  This morning noted with worsening thrombocytopenia, suspected underlying infectious etiology.  Continue to monitor CBC daily, will need platelet transfusion if counts drop below 20K or in setting of any spontaneous bleeding.  Anemia  Likely multifactorial, hemoglobin has been in range of 9.  Will continue to monitor.      Outpatient follow up plan: Outpatient appointment rescheduled for 6/3/2025.    Communication with patient/family:  I did update the patient.    Communication with team:  Did communicate with  primary team. I also spoke with pharmacy regarding methotrexate      I did review this patient with Dr. Kebede and they are in agreement with this plan.          Subjective:  Patient seen at bedside, reports feeling better this morning.  .  Discussed with physical therapy, has been cooperating well.    Review of Systems   Constitutional:  Negative for appetite change, fatigue and unexpected weight change.   Respiratory:  Negative for chest tightness and shortness of breath.    Cardiovascular:  Negative for chest pain and palpitations.   Gastrointestinal:  Negative for abdominal pain, constipation and vomiting.   Genitourinary:  Negative for enuresis and frequency.   Musculoskeletal:  Negative for arthralgias and joint swelling.   Skin:  Negative for color change and rash.   Neurological:  Negative for dizziness and facial asymmetry.   All other systems reviewed and are negative.         Objective:     Medication Administration - last 24 hours from 05/21/2025 1340 to 05/22/2025 1340         Date/Time Order Dose Route Action Action by     05/22/2025 0836 EDT atorvastatin (LIPITOR)  tablet 20 mg 20 mg Oral Given Aida Buchanan RN     05/22/2025 0900 EDT chlorhexidine (PERIDEX) 0.12 % oral rinse 15 mL 15 mL Mouth/Throat Given Aida Buchanan RN     05/21/2025 2110 EDT chlorhexidine (PERIDEX) 0.12 % oral rinse 15 mL 15 mL Mouth/Throat Given Maribell Catherine RN     05/22/2025 0602 EDT pantoprazole (PROTONIX) EC tablet 40 mg 40 mg Oral Given Leslye Roger RN     05/22/2025 0836 EDT allopurinol (ZYLOPRIM) tablet 300 mg 300 mg Oral Given Aida Buchanan RN     05/22/2025 0602 EDT heparin (porcine) subcutaneous injection 5,000 Units 5,000 Units Subcutaneous Given Leslye Roger RN     05/21/2025 2110 EDT heparin (porcine) subcutaneous injection 5,000 Units 5,000 Units Subcutaneous Given Maribell Catherine RN     05/21/2025 2110 EDT melatonin tablet 6 mg 6 mg Oral Given Maribell Catherine RN     05/22/2025 1135 EDT QUEtiapine (SEROquel) tablet 12.5 mg 12.5 mg Oral Given Aida Buchanan RN     05/21/2025 2110 EDT QUEtiapine (SEROquel) tablet 50 mg 50 mg Oral Given Maribell Catherine RN     05/22/2025 1032 EDT metFORMIN (GLUCOPHAGE-XR) 24 hr tablet 1,000 mg -- Oral Unheld by provider PATI Porras     05/22/2025 0900 EDT metFORMIN (GLUCOPHAGE-XR) 24 hr tablet 1,000 mg -- Oral Held Dose Jesenia Buenrostro PA-C     05/22/2025 0836 EDT docusate sodium (COLACE) capsule 100 mg 100 mg Oral Given Aida Buchanan RN     05/21/2025 1713 EDT docusate sodium (COLACE) capsule 100 mg 100 mg Oral Given Linda Hosler, RN     05/22/2025 1135 EDT senna (SENOKOT) tablet 17.2 mg 17.2 mg Oral Given Aida Buchanan RN     05/22/2025 0836 EDT sitaGLIPtin (JANUVIA) tablet 50 mg 50 mg Oral Given Aida Buchanan RN     05/22/2025 1137 EDT insulin lispro (HumALOG/ADMELOG) 100 units/mL subcutaneous injection 1-5 Units 2 Units Subcutaneous Given Aida Buchanan RN     05/22/2025 0834 EDT insulin lispro (HumALOG/ADMELOG) 100 units/mL subcutaneous injection 1-5 Units 1 Units Subcutaneous Given Aida Buchanan RN     " 05/21/2025 1713 EDT insulin lispro (HumALOG/ADMELOG) 100 units/mL subcutaneous injection 1-5 Units 1 Units Subcutaneous Given Linda Hosler, RN     05/21/2025 2111 EDT insulin lispro (HumALOG/ADMELOG) 100 units/mL subcutaneous injection 1-5 Units 2 Units Subcutaneous Given Maribell Catherine RN     05/21/2025 1645 EDT ondansetron (ZOFRAN) 16 mg, dexamethasone (DECADRON) 10 mg in sodium chloride 0.9 % 50 mL IVPB -- Intravenous New Bag Linda Hosler, RN     05/21/2025 1649 EDT sodium bicarbonate 100 mEq in dextrose 5 % 1,000 mL infusion -- Intravenous Canceled Entry Linda Hosler, RN     05/21/2025 2156 EDT methotrexate (PF) 6,000 mg in sodium chloride 0.9 % 1,000 mL IVPB 0 mg Intravenous Stopped Marsha Huitron RN     05/21/2025 1719 EDT methotrexate (PF) 6,000 mg in sodium chloride 0.9 % 1,000 mL IVPB 6,000 mg Intravenous New Bag Yoon Ng RN     05/22/2025 0836 EDT cefadroxil (DURICEF) capsule 1,000 mg 1,000 mg Oral Given Aida Buchanan RN     05/21/2025 2111 EDT cefadroxil (DURICEF) capsule 1,000 mg 1,000 mg Oral Given Maribell Catherine RN     05/22/2025 0107 EDT sodium bicarbonate 150 mEq in dextrose 5 % 1,000 mL infusion 100 mL/hr Intravenous New Bag Leslye Roger RN            /74 (BP Location: Right arm)   Pulse 67   Temp 97.5 °F (36.4 °C) (Oral)   Resp 17   Ht 5' 10\" (1.778 m)   Wt 80 kg (176 lb 5.9 oz)   SpO2 97%   BMI 25.31 kg/m²       Physical Exam  Constitutional:       Appearance: Normal appearance.   HENT:      Head: Normocephalic and atraumatic.     Cardiovascular:      Rate and Rhythm: Normal rate and regular rhythm.      Pulses: Normal pulses.      Heart sounds: Normal heart sounds.   Pulmonary:      Effort: Pulmonary effort is normal.      Breath sounds: Normal breath sounds.   Abdominal:      General: Abdomen is flat.      Palpations: Abdomen is soft.     Skin:     General: Skin is warm and dry.     Neurological:      Mental Status: He is alert.           Recent Results (from " the past 48 hours)   Fingerstick Glucose (POCT)    Collection Time: 05/20/25  4:15 PM   Result Value Ref Range    POC Glucose 88 65 - 140 mg/dl   Fingerstick Glucose (POCT)    Collection Time: 05/20/25  9:11 PM   Result Value Ref Range    POC Glucose 105 65 - 140 mg/dl   Fingerstick Glucose (POCT)    Collection Time: 05/21/25  5:52 AM   Result Value Ref Range    POC Glucose 87 65 - 140 mg/dl   TSH, 3rd generation with Free T4 reflex    Collection Time: 05/21/25  8:34 AM   Result Value Ref Range    TSH 3RD GENERATON 1.953 0.450 - 4.500 uIU/mL   Urinalysis with microscopic    Collection Time: 05/21/25  9:17 AM   Result Value Ref Range    Color, UA Colorless     Clarity, UA Clear     Specific Gravity, UA 1.010 1.003 - 1.030    pH, UA 7.5 4.5, 5.0, 5.5, 6.0, 6.5, 7.0, 7.5, 8.0    Leukocytes, UA Negative Negative    Nitrite, UA Negative Negative    Protein, UA Negative Negative mg/dl    Glucose, UA Negative Negative mg/dl    Ketones, UA Negative Negative mg/dl    Urobilinogen, UA <2.0 <2.0 mg/dl mg/dl    Bilirubin, UA Negative Negative    Occult Blood, UA Negative Negative    RBC, UA 1-2 None Seen, 1-2 /hpf    WBC, UA 2-4 (A) None Seen, 1-2 /hpf    Epithelial Cells None Seen None Seen, Occasional /hpf    Bacteria, UA None Seen None Seen, Occasional /hpf   Fingerstick Glucose (POCT)    Collection Time: 05/21/25 10:24 AM   Result Value Ref Range    POC Glucose 165 (H) 65 - 140 mg/dl   CBC and differential    Collection Time: 05/21/25 10:33 AM   Result Value Ref Range    WBC 9.02 4.31 - 10.16 Thousand/uL    RBC 3.38 (L) 3.88 - 5.62 Million/uL    Hemoglobin 10.3 (L) 12.0 - 17.0 g/dL    Hematocrit 32.0 (L) 36.5 - 49.3 %    MCV 95 82 - 98 fL    MCH 30.5 26.8 - 34.3 pg    MCHC 32.2 31.4 - 37.4 g/dL    RDW 15.9 (H) 11.6 - 15.1 %    MPV 10.5 8.9 - 12.7 fL    Platelets 144 (L) 149 - 390 Thousands/uL   Comprehensive metabolic panel    Collection Time: 05/21/25 10:33 AM   Result Value Ref Range    Sodium 142 135 - 147 mmol/L     Potassium 3.8 3.5 - 5.3 mmol/L    Chloride 105 96 - 108 mmol/L    CO2 27 21 - 32 mmol/L    ANION GAP 10 4 - 13 mmol/L    BUN 19 5 - 25 mg/dL    Creatinine 1.28 0.60 - 1.30 mg/dL    Glucose 185 (H) 65 - 140 mg/dL    Calcium 9.1 8.4 - 10.2 mg/dL    AST 24 13 - 39 U/L    ALT 40 7 - 52 U/L    Alkaline Phosphatase 70 34 - 104 U/L    Total Protein 6.1 (L) 6.4 - 8.4 g/dL    Albumin 3.5 3.5 - 5.0 g/dL    Total Bilirubin 0.49 0.20 - 1.00 mg/dL    eGFR 58 ml/min/1.73sq m   Manual Differential(PHLEBS Do Not Order)    Collection Time: 05/21/25 10:33 AM   Result Value Ref Range    Segmented % 42 (L) 43 - 75 %    Bands % 3 0 - 8 %    Lymphocytes % 36 14 - 44 %    Monocytes % 9 4 - 12 %    Eosinophils % 0 0 - 6 %    Basophils % 0 0 - 1 %    Metamyelocytes % 2 (H) 0 - 1 %    Myelocytes % 2 (H) 0 - 1 %    Atypical Lymphocytes % 6 (H) <=0 %    Absolute Neutrophils 4.06 1.85 - 7.62 Thousand/uL    Absolute Lymphocytes 3.79 0.60 - 4.47 Thousand/uL    Absolute Monocytes 0.81 0.00 - 1.22 Thousand/uL    Absolute Eosinophils 0.00 0.00 - 0.40 Thousand/uL    Absolute Basophils 0.00 0.00 - 0.10 Thousand/uL    Absolute Metamyelocytes 0.18 (H) 0.00 - 0.10 Thousand/uL    Absolute Myelocytes 0.18 (H) 0.00 - 0.10 Thousand/uL    Total Counted      RBC Morphology Present     Platelet Estimate Adequate Adequate    Pathology Review Yes (A) No    Differential Comment see note     Anisocytosis Present     Macrocytes Present    Path Slide Review    Collection Time: 05/21/25 10:33 AM   Result Value Ref Range    Case Report       Clinical Pathology Report                         Case: [id number]                                  Authorizing Provider:  Nicki Renteria MD             Collected:           05/21/2025 1033              Ordering Location:     [facility]  Received:            05/21/2025 1203                                     [facility]                                                        Pathologist:           Griffin  MD Nazario                                                          Specimen:    Central Venous Line                                                                        Path Slide       Peripheral blood smear review shows white blood cells with left shifted granulocytes including immature forms without significant features of dysplasia or circulating blasts.  Lymphocytes show occasional reactive-appearing forms without a distinct population of large atypical cells.  Red blood cells show normochromic normocytic anemia with mild nonspecific anisopoikilocytosis.  Platelets show normal quantity and morphology without significant satellitosis or clumping.  Overall, the combination of findings is not definitively specific.  The patient's history of CNS lymphoma with suspicion for sepsis and recent granulocyte stimulating factor administration is noted.  The findings may be compatible with the clinical history in the correct clinical setting.  Alternative etiologies or contributing factors may include infection, other drug effect or toxin exposure, autoimmune disease, postvaccination, or other systemic inflammatory condition.  Correlation with clinical impression and other laboratory findings is recommended.     UA (URINE) with reflex to Scope    Collection Time: 05/21/25 12:42 PM   Result Value Ref Range    Color, UA Light Yellow     Clarity, UA Clear     Specific Gravity, UA 1.016 1.003 - 1.030    pH, UA 5.5 4.5, 5.0, 5.5, 6.0, 6.5, 7.0, 7.5, 8.0    Leukocytes, UA Negative Negative    Nitrite, UA Negative Negative    Protein, UA Negative Negative mg/dl    Glucose, UA Negative Negative mg/dl    Ketones, UA Negative Negative mg/dl    Urobilinogen, UA <2.0 <2.0 mg/dl mg/dl    Bilirubin, UA Negative Negative    Occult Blood, UA Negative Negative   Fingerstick Glucose (POCT)    Collection Time: 05/21/25  4:20 PM   Result Value Ref Range    POC Glucose 208 (H) 65 - 140 mg/dl   Fingerstick Glucose (POCT)    Collection Time:  05/21/25  8:45 PM   Result Value Ref Range    POC Glucose 264 (H) 65 - 140 mg/dl   UA (URINE) with reflex to Scope    Collection Time: 05/21/25 10:10 PM   Result Value Ref Range    Color, UA Light Yellow     Clarity, UA Clear     Specific Gravity, UA 1.008 1.003 - 1.030    pH, UA 8.0 4.5, 5.0, 5.5, 6.0, 6.5, 7.0, 7.5, 8.0    Leukocytes, UA Negative Negative    Nitrite, UA Negative Negative    Protein, UA 70 (1+) (A) Negative mg/dl    Glucose,  (3/10%) (A) Negative mg/dl    Ketones, UA Negative Negative mg/dl    Urobilinogen, UA <2.0 <2.0 mg/dl mg/dl    Bilirubin, UA Negative Negative    Occult Blood, UA Negative Negative   Urine Microscopic    Collection Time: 05/21/25 10:10 PM   Result Value Ref Range    RBC, UA None Seen None Seen, 1-2 /hpf    WBC, UA 1-2 None Seen, 1-2 /hpf    Epithelial Cells None Seen None Seen, Occasional /hpf    Bacteria, UA None Seen None Seen, Occasional /hpf   UA (URINE) with reflex to Scope    Collection Time: 05/22/25  4:02 AM   Result Value Ref Range    Color, UA Yellow     Clarity, UA Clear     Specific Gravity, UA 1.013 1.003 - 1.030    pH, UA 8.0 4.5, 5.0, 5.5, 6.0, 6.5, 7.0, 7.5, 8.0    Leukocytes, UA Negative Negative    Nitrite, UA Negative Negative    Protein, UA 50 (1+) (A) Negative mg/dl    Glucose,  (1/10%) (A) Negative mg/dl    Ketones, UA Negative Negative mg/dl    Urobilinogen, UA <2.0 <2.0 mg/dl mg/dl    Bilirubin, UA Negative Negative    Occult Blood, UA Negative Negative   Urine Microscopic    Collection Time: 05/22/25  4:02 AM   Result Value Ref Range    RBC, UA 1-2 None Seen, 1-2 /hpf    WBC, UA 1-2 None Seen, 1-2 /hpf    Epithelial Cells None Seen None Seen, Occasional /hpf    Bacteria, UA None Seen None Seen, Occasional /hpf    MUCUS THREADS Occasional (A) None Seen    Amorphous Crystals, UA Occasional    CBC and differential    Collection Time: 05/22/25  6:13 AM   Result Value Ref Range    WBC 9.85 4.31 - 10.16 Thousand/uL    RBC 2.87 (L) 3.88 - 5.62  Million/uL    Hemoglobin 8.9 (L) 12.0 - 17.0 g/dL    Hematocrit 25.7 (L) 36.5 - 49.3 %    MCV 90 82 - 98 fL    MCH 31.0 26.8 - 34.3 pg    MCHC 34.6 31.4 - 37.4 g/dL    RDW 14.6 11.6 - 15.1 %    MPV 10.6 8.9 - 12.7 fL    Platelets 145 (L) 149 - 390 Thousands/uL   Comprehensive metabolic panel    Collection Time: 05/22/25  6:13 AM   Result Value Ref Range    Sodium 138 135 - 147 mmol/L    Potassium 4.0 3.5 - 5.3 mmol/L    Chloride 100 96 - 108 mmol/L    CO2 33 (H) 21 - 32 mmol/L    ANION GAP 5 4 - 13 mmol/L    BUN 24 5 - 25 mg/dL    Creatinine 1.26 0.60 - 1.30 mg/dL    Glucose 187 (H) 65 - 140 mg/dL    Glucose, Fasting 187 (H) 65 - 99 mg/dL    Calcium 8.4 8.4 - 10.2 mg/dL    Corrected Calcium 9.0 8.3 - 10.1 mg/dL    AST 16 13 - 39 U/L    ALT 32 7 - 52 U/L    Alkaline Phosphatase 58 34 - 104 U/L    Total Protein 5.3 (L) 6.4 - 8.4 g/dL    Albumin 3.2 (L) 3.5 - 5.0 g/dL    Total Bilirubin 0.74 0.20 - 1.00 mg/dL    eGFR 59 ml/min/1.73sq m   Manual Differential(PHLEBS Do Not Order)    Collection Time: 05/22/25  6:13 AM   Result Value Ref Range    Segmented % 65 43 - 75 %    Bands % 10 (H) 0 - 8 %    Lymphocytes % 16 14 - 44 %    Monocytes % 5 4 - 12 %    Eosinophils % 0 0 - 6 %    Basophils % 0 0 - 1 %    Metamyelocytes % 2 (H) 0 - 1 %    Atypical Lymphocytes % 2 (H) <=0 %    Absolute Neutrophils 7.39 1.85 - 7.62 Thousand/uL    Absolute Lymphocytes 1.77 0.60 - 4.47 Thousand/uL    Absolute Monocytes 0.49 0.00 - 1.22 Thousand/uL    Absolute Eosinophils 0.00 0.00 - 0.40 Thousand/uL    Absolute Basophils 0.00 0.00 - 0.10 Thousand/uL    Absolute Metamyelocytes 0.20 (H) 0.00 - 0.10 Thousand/uL    Total Counted      nRBC 2 0 - 2 /100 WBC    RBC Morphology Present     Platelet Estimate Adequate Adequate    Anisocytosis Present     Microcytes Present     Polychromasia Present    Fingerstick Glucose (POCT)    Collection Time: 05/22/25  6:26 AM   Result Value Ref Range    POC Glucose 193 (H) 65 - 140 mg/dl   UA (URINE) with reflex to  Scope    Collection Time: 05/22/25 10:04 AM   Result Value Ref Range    Color, UA Light Yellow     Clarity, UA Clear     Specific Gravity, UA 1.013 1.003 - 1.030    pH, UA 7.5 4.5, 5.0, 5.5, 6.0, 6.5, 7.0, 7.5, 8.0    Leukocytes, UA Negative Negative    Nitrite, UA Negative Negative    Protein, UA Trace (A) Negative mg/dl    Glucose, UA Negative Negative mg/dl    Ketones, UA Negative Negative mg/dl    Urobilinogen, UA <2.0 <2.0 mg/dl mg/dl    Bilirubin, UA Negative Negative    Occult Blood, UA Negative Negative   Urine Microscopic    Collection Time: 05/22/25 10:04 AM   Result Value Ref Range    RBC, UA 1-2 None Seen, 1-2 /hpf    WBC, UA 1-2 None Seen, 1-2 /hpf    Epithelial Cells None Seen None Seen, Occasional /hpf    Bacteria, UA None Seen None Seen, Occasional /hpf   Fingerstick Glucose (POCT)    Collection Time: 05/22/25 10:39 AM   Result Value Ref Range    POC Glucose 226 (H) 65 - 140 mg/dl       CT abdomen pelvis w contrast  Result Date: 5/19/2025  Narrative: CT ABDOMEN AND PELVIS WITH IV CONTRAST INDICATION: recurrent bacteremia, look for source. Blood culture and urine culture positive for gram-negative rods. COMPARISON: April 30, 2025. TECHNIQUE: CT examination of the abdomen and pelvis was performed. Multiplanar 2D reformatted images were created from the source data. This examination, like all CT scans performed [facility] the [facility], was performed utilizing techniques to minimize radiation dose exposure, including the use of iterative reconstruction and automated exposure control. Radiation dose length product (DLP) for this visit: 390.85 mGy-cm. IV Contrast: 85 mL of iohexol (OMNIPAQUE) Enteric Contrast: Not administered. FINDINGS: ABDOMEN LOWER CHEST: A small calcified granuloma is redemonstrated anteriorly at the right lung base. There is coronary artery disease. A catheter terminates in the right atrium. LIVER/BILIARY TREE: There is a 13 x 9 mm cyst posteriorly in the right lobe of the  liver, similar to the prior study. GALLBLADDER: No calcified gallstones. No pericholecystic inflammatory change. SPLEEN: Unremarkable. PANCREAS: Unremarkable. ADRENAL GLANDS: Unremarkable. KIDNEYS/URETERS: Mild bilateral nonspecific perinephric stranding, increased somewhat compared with April 30, 2025. No hydronephrosis bilaterally. STOMACH AND BOWEL: No bowel obstruction. No pericolonic inflammatory change. APPENDIX: Normal. ABDOMINOPELVIC CAVITY: There is a small amount of free fluid, best demonstrated in the right lower quadrant and paracolic gutters. No pneumoperitoneum. VESSELS: Atherosclerosis. No abdominal aortic aneurysm. PELVIS REPRODUCTIVE ORGANS: Unremarkable for patient's age. URINARY BLADDER: Unremarkable. ABDOMINAL WALL/INGUINAL REGIONS: There is some gas within the subcutaneous fat of the anterior abdominal walls near the level of the umbilicus, left more so than right, possibly related to recent injections. Small fat-containing left inguinal hernia. BONES: No acute fracture or suspicious osseous lesion. Spinal degenerative changes, most notably at L5-S1.     Impression: There is mild bilateral nonspecific perinephric stranding, however, this does appear somewhat increased compared with April 30, 2025. Correlation with urinalysis and urine culture and sensitivity is recommended. There is a small amount of free fluid. Other nonemergent findings as above. Workstation performed: [id number]     CT abdomen pelvis wo contrast  Result Date: 4/30/2025  Narrative: CT ABDOMEN AND PELVIS WITHOUT IV CONTRAST INDICATION: Gram-negative bacteremia, evaluate for source infection. COMPARISON: None. TECHNIQUE: CT examination of the abdomen and pelvis was performed without intravenous contrast. Multiplanar 2D reformatted images were created from the source data. This examination, like all CT scans performed in the [facility], was performed utilizing techniques to minimize radiation dose exposure,  including the use of iterative reconstruction and automated exposure control. Radiation dose length product (DLP) for this visit: 560.94 mGy-cm. Enteric Contrast: Not administered. FINDINGS: ABDOMEN LOWER CHEST: Calcified granuloma in the right middle lobe. LIVER/BILIARY TREE: 1 cm cyst in the right lobe. Liver is otherwise unremarkable.1 GALLBLADDER: No calcified gallstones. No pericholecystic inflammatory change. SPLEEN: Unremarkable. PANCREAS: Unremarkable. ADRENAL GLANDS: Unremarkable. KIDNEYS/URETERS: Unremarkable. No hydronephrosis. STOMACH AND BOWEL: Large amount of formed stool throughout the entire colon. No evidence of obstruction. APPENDIX: Normal. ABDOMINOPELVIC CAVITY: No ascites. No pneumoperitoneum. No lymphadenopathy. VESSELS: Unremarkable for patient's age. PELVIS REPRODUCTIVE ORGANS: Unremarkable for patient's age. URINARY BLADDER: Unremarkable. ABDOMINAL WALL/INGUINAL REGIONS: Mild infiltration of the anterior abdominal wall in the left lower quadrant, possibly a soft tissue contusion. BONES: No acute fracture or suspicious osseous lesion.     Impression: No acute inflammatory process in the abdomen or pelvis. Large amount of stool throughout the colon. Workstation performed: [id number]     XR abdomen obstruction series  Result Date: 4/30/2025  Narrative: XR ABDOMEN OBSTRUCTION SERIES INDICATION: Constipation, lethargy, gram-negative bacteremia. Confusion, newly diagnosed intracranial mass. Constipation. COMPARISON: MRI abdomen 3/30/2025 and CT chest abdomen pelvis 3/29/2025 FINDINGS: Large volume of colonic stool. Otherwise unremarkable bowel gas pattern. No pneumoperitoneum or abnormal air-fluid levels. No pathologic calcification or soft tissue mass. Bones are unremarkable for patient's age. Examination of the chest reveals clear lungs and a normal cardiomediastinal silhouette. Right PICC with tip projecting over the superior cavoatrial junction.     Impression: 1.  Large colonic stool burden  without obstruction or other acute abdominal findings. 2.  No acute cardiopulmonary findings. Resident: CINTHYA FAJARDO I, the attending radiologist, have reviewed the images and agree with the final report above. Workstation performed: [id number]       I have personally reviewed labs, imaging studies, and pertinent reports.      This note has been generated by voice recognition software system.  Therefore, there may be spelling, grammar, and or syntax errors. Please contact if questions arise.

## 2025-05-22 NOTE — ASSESSMENT & PLAN NOTE
Hematology is following.     Complicated by obstructive hydrocephalus, status post EVD on 4/13 through 4/19.    Status post rituximab, status post high-dose methotrexate and intrathecal cytarabine on 4/17.  Repeat dose of rituximab on 5/10  Methotrexate was on hold earlier in the week due to suspected sepsis with blood culture positive for gram-negative katherine and urine culture positive for gram-negative katherine.  Seen by ID, status post IV antibiotics and now switched to Duricef on 5/20, was cleared for chemotherapy.  Patient received IV methotrexate on 5/21.  Continue to monitor renal function continue bicarb drip as mentioned above with plan to keep urine pH more than 7

## 2025-05-22 NOTE — PROGRESS NOTES
"   05/22/25 1345   Pain Assessment   Pain Score No Pain   Restrictions/Precautions   Precautions 1:1;Bed/chair alarms;Cognitive;Fall Risk;Limb alert;Supervision on toilet/commode  (Chemo precautions, L UE IV which was active during session)   Cognition   Arousal/Participation Cooperative;Alert  (he is more alert now after taking a nap)   Attention Attends with cues to redirect   Memory Decreased long term memory;Decreased recall of biographical information;Decreased short term memory;Decreased recall of recent events;Decreased recall of precautions  (not oriented to place, date and time)   Following Commands Follows one step commands without difficulty   Subjective   Subjective \"I feel better\"   Lying to Sitting on Side of Bed   Type of Assistance Needed Supervision   Lying to Sitting on Side of Bed CARE Score 4   Sit to Stand   Type of Assistance Needed Supervision   Comment with no AD   Sit to Stand CARE Score 4   Transfer Bed/Chair/Wheelchair   Adaptive Equipment None   Walk 10 Feet   Type of Assistance Needed Supervision   Comment S with no AD   Walk 10 Feet CARE Score 4   Walk 50 Feet with Two Turns   Type of Assistance Needed Supervision   Comment S with no AD   Walk 50 Feet with Two Turns CARE Score 4   Walk 150 Feet   Type of Assistance Needed Supervision   Comment no AD   Walk 150 Feet CARE Score 4   Ambulation   Primary Mode of Locomotion Prior to Admission Walk   Distance Walked (feet) 350 ft  (150 X 2)   Assist Device Other  (no AD)   Gait Pattern Slow Vicki;Improper weight shift   Walk Assist Level Supervision   Does the patient walk? 2. Yes   Toilet Transfer   Findings stood to urinate   Therapeutic Interventions   Strengthening Supine SAQ with 4# wts , bridging over bolster   Equipment Use   NuStep LE only X 15 mins spm to 56   Assessment   Treatment Assessment Pt. feeling refreshed and rested after 25 mins nap and is ready to participate again for more PT. He participated in more ambulation in the " hallway as well as supine thera ex and nu step. No major LOB noted and no complaints reported by pt. HE was back in the room at the end of session with 1:1 present .   Problem List Decreased strength;Decreased endurance;Impaired balance;Decreased mobility;Decreased coordination;Decreased cognition;Impaired judgement;Decreased safety awareness   Barriers to Discharge   (medical complexity)   PT Barriers   Functional Limitation Car transfers;Stair negotiation;Standing;Transfers;Walking   Plan   Treatment/Interventions Functional transfer training;LE strengthening/ROM;Elevations;Therapeutic exercise;Endurance training;Patient/family training;Equipment eval/education;Gait training   Discharge Recommendation   Rehab Resource Intensity Level, PT   (Home health PT)   Equipment Recommended Walker   Discharge Summary D/C still pending medical clearance   PT Therapy Minutes   PT Time In 1345   PT Time Out 1425   PT Total Time (minutes) 40   PT Mode of treatment - Individual (minutes) 40   PT Mode of treatment - Concurrent (minutes) 0   PT Mode of treatment - Group (minutes) 0   PT Mode of treatment - Co-treat (minutes) 0   PT Mode of Treatment - Total time(minutes) 40 minutes   PT Cumulative Minutes 951   Therapy Time missed   Time missed? No

## 2025-05-22 NOTE — ASSESSMENT & PLAN NOTE
TSH/free T4 4/21/25 = 0.019/1.13  Repeat TSH/free T4 done 5/12 = TSH was 0.287 with free T4 0.85  TSH has normalized 5/21.  D/W [facility]o = [facility] -->  plan for OP US.

## 2025-05-22 NOTE — ASSESSMENT & PLAN NOTE
Hemoglobin A1C was 6.6 on 2/22/25  Sees Endo [facility] in [place]  Home:  Janumet XR  qd  Here: Januvia 50mg qd  Continue DM diet  On QID Accuchecks with SSI Algo 1  BSs have continued to improve with steroid being tapered  = LD was 5/19 5/17 creat bumped up to 1.38 so Metformin was placed on hold  Creatinine is 1.26 today 5/22.    Since creatinine is stable, will restart Metformin 1000 mg XR to start now 5/22/25. (Last contrast study was 5/18/25).  I d/w pharmacy here and there is no contraindication to restarting his Metformin with his recent chemo and getting Leucovorin tonight  Stopped the Lispro WM 5/21/25.

## 2025-05-22 NOTE — ASSESSMENT & PLAN NOTE
Monitor status post methotrexate  Peak creat was 2.77 on 4/22/25  Previous baseline as OP 5/2023-2/22/25 was 1.1 to 1.0  CMP 5/9/25 with creatinine of 1.18 down from 1.23 on 5/8 and on 5/10 was 1.15  CMP on 5/12/25 showed creatinine stable at 1.1  5/17: creat up again to 1.38 = Being followed by Nephrology. Met sepsis criteria 5/17 and placed on IVF ordered as per sepsis protocol  5/19: creat trending down and was 1.20 -->  IVF was  reduced to 50ml/hr per renal.  On 5/20/25-5/21/25, they want to continue same IVF  Creatinine today 5/22 is 1.26  For CMP 5/23/25

## 2025-05-22 NOTE — PROGRESS NOTES
OT Daily Treatment Note     05/22/25 0700   Pain Assessment   Pain Assessment Tool 0-10   Pain Score No Pain   Restrictions/Precautions   Precautions 1:1;Bed/chair alarms;Fall Risk;Limb alert;Multiple lines;Supervision on toilet/commode  (Chemo precautions)   Weight Bearing Restrictions No   ROM Restrictions No   Lifestyle   Autonomy Pt agreeable to participate in skilled OT session today.   Oral Hygiene   Type of Assistance Needed Supervision   Physical Assistance Level No physical assistance   Comment Pt able to perform oral hygiene in stance at bathroom sink with supervision.   Oral Hygiene CARE Score 4   Shower/Bathe Self   Type of Assistance Needed Supervision   Physical Assistance Level No physical assistance   Comment Seated EOB. Pt able to stand without suppoert or LOB for chavo area and rear washing. Completed without need for breaks.   Shower/Bathe Self CARE Score 4   Upper Body Dressing   Type of Assistance Needed Supervision   Physical Assistance Level No physical assistance   Comment Seated EOB.   Upper Body Dressing CARE Score 4   Lower Body Dressing   Type of Assistance Needed Supervision   Physical Assistance Level No physical assistance   Comment Pt able to don bilateral LE seated EOB and stand to don over bilateral hips with supervision.   Lower Body Dressing CARE Score 4   Putting On/Taking Off Footwear   Type of Assistance Needed Supervision   Physical Assistance Level No physical assistance   Comment Seated EOB to don socks and in chair with armrests to don shoes with supervision. Pt able to maintain stability when bending over to don footwear.   Putting On/Taking Off Footwear CARE Score 4   Lying to Sitting on Side of Bed   Type of Assistance Needed Supervision   Physical Assistance Level No physical assistance   Comment Pt able to safely move from supine to sitting EOB with bilateral feet on the floor.   Lying to Sitting on Side of Bed CARE Score 4   Sit to Stand   Type of Assistance Needed  Supervision   Physical Assistance Level No physical assistance   Comment No AD. Pt able to stand from EOB with supervision and no LOB.   Sit to Stand CARE Score 4   Bed-Chair Transfer   Type of Assistance Needed Supervision   Physical Assistance Level No physical assistance   Comment Pt able to transfer with supervision,without AD, and without gait belt.   Chair/Bed-to-Chair Transfer CARE Score 4   Functional Standing Tolerance   Time 10 minutes   Activity Bean bag toss and ball catch   Comments Pt engaged in a bean bag toss activity and a ball catching while standing on a balance board to address dynamic standing balance, standing tolerance, endurance, and coordination. Pt had good tolerance to activity requiring CGA. No LOB noted. Pt completed x2 reps of bean bag toss and x2 reps of ball tossing with second trial graded up by having pt initially start with eyes closed.   Exercise Tools   UE Ergometer Pt engaged in UB strengthening using UE ergometer for 5 minutes prograde and 5 minutes retrograde with good tolerance to focus on UB strength, UB coordination, endurance, and activity tolerance. Short rest break required in between sets for fatigue management.   Cognition   Overall Cognitive Status Impaired   Arousal/Participation Alert;Responsive;Cooperative   Attention Attends with cues to redirect   Orientation Level Oriented to person   Memory Decreased long term memory;Decreased short term memory;Decreased recall of biographical information;Decreased recall of recent events;Decreased recall of precautions   Following Commands Follows one step commands without difficulty   Activity Tolerance   Activity Tolerance Patient tolerated treatment well   Assessment   Treatment Assessment Pt engaged in 90-minute skilled OT session with focus on ADL training, dynamic standing balance, functional reach, UE coordination, attention, following commands, visual-motor integration, and reaction time. Able to complete ADLs safely  "with supervision. Pt completed UE strengthening with UE ergometer for 5 minutes prograde and 5 minutes retrograde, with a short break in between directions. Pt then completed a bean bag toss activity while standing on a balance board with a gait belt and CGA to address dynamic standing balance, UE coordination, and visual-motor integration. Pt was able to successfully toss 8 bean bags into the target. Pt then completed a catching activity while standing on a balance board to address dynamic standing balance, functional reach, UE coordination, and visual-motor integration. Pt was first directed to catch the ball with the eyes open. With the eyes open, pt was able make successful catches. When directed to close the eyes until cued with \"Go,\" pt demonstrated increased difficulty with making successful catches, demonstrating some limitations in reaction time. Pt tolerated standing activities for ~20 minutes with a gait belt and CGA. Pt able to attend to all tasks. Pt would benefit from continued OT services to focus on the abovementioned skills. Pt scheduled to d/c Saturday pending medical clearance.   Prognosis Fair   Barriers to Discharge Other (Comment)   Barriers to Discharge Comments Medical-related barriers for d/c   OT Therapy Minutes   OT Time In 0700   OT Time Out 0830   OT Total Time (minutes) 90   OT Mode of treatment - Individual (minutes) 0   OT Mode of treatment - Concurrent (minutes) 0   OT Mode of treatment - Group (minutes) 0   OT Mode of treatment - Co-treat (minutes) 0   OT Mode of Treatment - Total time(minutes) 0 minutes   OT Cumulative Minutes 955   Therapy Time missed   Time missed? No     "

## 2025-05-22 NOTE — ASSESSMENT & PLAN NOTE
5/19 - hemoglobin of 8.0 - improved from 6.7 after transfusion of 1u pRBC over the weekend  5/22- Hgb 8.9  -- down from 10.3 yesterday

## 2025-05-22 NOTE — ASSESSMENT & PLAN NOTE
5/19- Platelets down to 79K- will need to continue monitoring with chemotherapy now on hold  5/22- Plts improved to 145K

## 2025-05-22 NOTE — ASSESSMENT & PLAN NOTE
Per H-O  Platelets dropped to 66 on 5/18 and on 5/19 was up to 79 --> on 5/21 up to 144 and today 5/22 is 145  For platelet transfusion if counts drop below 20K or in setting of any spontaneous bleeding

## 2025-05-23 PROBLEM — M25.472 ANKLE EDEMA, BILATERAL: Status: ACTIVE | Noted: 2025-05-23

## 2025-05-23 PROBLEM — M25.471 ANKLE EDEMA, BILATERAL: Status: ACTIVE | Noted: 2025-05-23

## 2025-05-23 LAB
ALBUMIN SERPL BCG-MCNC: 3.2 G/DL (ref 3.5–5)
ALBUMIN SERPL BCG-MCNC: 3.5 G/DL (ref 3.5–5)
ALP SERPL-CCNC: 63 U/L (ref 34–104)
ALP SERPL-CCNC: 64 U/L (ref 34–104)
ALT SERPL W P-5'-P-CCNC: 150 U/L (ref 7–52)
ALT SERPL W P-5'-P-CCNC: 158 U/L (ref 7–52)
ANION GAP SERPL CALCULATED.3IONS-SCNC: 7 MMOL/L (ref 4–13)
ANION GAP SERPL CALCULATED.3IONS-SCNC: 7 MMOL/L (ref 4–13)
AST SERPL W P-5'-P-CCNC: 100 U/L (ref 13–39)
AST SERPL W P-5'-P-CCNC: 89 U/L (ref 13–39)
BILIRUB SERPL-MCNC: 0.46 MG/DL (ref 0.2–1)
BILIRUB SERPL-MCNC: 0.49 MG/DL (ref 0.2–1)
BILIRUB UR QL STRIP: NEGATIVE
BUN SERPL-MCNC: 19 MG/DL (ref 5–25)
BUN SERPL-MCNC: 20 MG/DL (ref 5–25)
CALCIUM ALBUM COR SERPL-MCNC: 9.1 MG/DL (ref 8.3–10.1)
CALCIUM SERPL-MCNC: 8.5 MG/DL (ref 8.4–10.2)
CALCIUM SERPL-MCNC: 8.8 MG/DL (ref 8.4–10.2)
CHLORIDE SERPL-SCNC: 98 MMOL/L (ref 96–108)
CHLORIDE SERPL-SCNC: 99 MMOL/L (ref 96–108)
CLARITY UR: CLEAR
CO2 SERPL-SCNC: 34 MMOL/L (ref 21–32)
CO2 SERPL-SCNC: 36 MMOL/L (ref 21–32)
COLOR UR: COLORLESS
CREAT SERPL-MCNC: 1.23 MG/DL (ref 0.6–1.3)
CREAT SERPL-MCNC: 1.24 MG/DL (ref 0.6–1.3)
ERYTHROCYTE [DISTWIDTH] IN BLOOD BY AUTOMATED COUNT: 15.4 % (ref 11.6–15.1)
GFR SERPL CREATININE-BSD FRML MDRD: 60 ML/MIN/1.73SQ M
GFR SERPL CREATININE-BSD FRML MDRD: 61 ML/MIN/1.73SQ M
GLUCOSE P FAST SERPL-MCNC: 114 MG/DL (ref 65–99)
GLUCOSE SERPL-MCNC: 114 MG/DL (ref 65–140)
GLUCOSE SERPL-MCNC: 122 MG/DL (ref 65–140)
GLUCOSE SERPL-MCNC: 150 MG/DL (ref 65–140)
GLUCOSE SERPL-MCNC: 157 MG/DL (ref 65–140)
GLUCOSE SERPL-MCNC: 162 MG/DL (ref 65–140)
GLUCOSE SERPL-MCNC: 163 MG/DL (ref 65–140)
GLUCOSE UR STRIP-MCNC: NEGATIVE MG/DL
HCT VFR BLD AUTO: 30.1 % (ref 36.5–49.3)
HGB BLD-MCNC: 9.9 G/DL (ref 12–17)
HGB UR QL STRIP.AUTO: NEGATIVE
KETONES UR STRIP-MCNC: NEGATIVE MG/DL
LEUKOCYTE ESTERASE UR QL STRIP: NEGATIVE
MCH RBC QN AUTO: 30.9 PG (ref 26.8–34.3)
MCHC RBC AUTO-ENTMCNC: 32.9 G/DL (ref 31.4–37.4)
MCV RBC AUTO: 94 FL (ref 82–98)
MTX SERPL-SCNC: 5.1 UMOL/L
NITRITE UR QL STRIP: NEGATIVE
PH UR STRIP.AUTO: 8.5 [PH]
PLATELET # BLD AUTO: 153 THOUSANDS/UL (ref 149–390)
PMV BLD AUTO: 10.5 FL (ref 8.9–12.7)
POTASSIUM SERPL-SCNC: 3.7 MMOL/L (ref 3.5–5.3)
POTASSIUM SERPL-SCNC: 3.8 MMOL/L (ref 3.5–5.3)
PROT SERPL-MCNC: 5.5 G/DL (ref 6.4–8.4)
PROT SERPL-MCNC: 5.9 G/DL (ref 6.4–8.4)
PROT UR STRIP-MCNC: NEGATIVE MG/DL
RBC # BLD AUTO: 3.2 MILLION/UL (ref 3.88–5.62)
SODIUM SERPL-SCNC: 139 MMOL/L (ref 135–147)
SODIUM SERPL-SCNC: 142 MMOL/L (ref 135–147)
SP GR UR STRIP.AUTO: 1.01 (ref 1–1.03)
UROBILINOGEN UR STRIP-ACNC: <2 MG/DL
WBC # BLD AUTO: 6.54 THOUSAND/UL (ref 4.31–10.16)

## 2025-05-23 PROCEDURE — 99232 SBSQ HOSP IP/OBS MODERATE 35: CPT | Performed by: INTERNAL MEDICINE

## 2025-05-23 PROCEDURE — 97535 SELF CARE MNGMENT TRAINING: CPT

## 2025-05-23 PROCEDURE — 99233 SBSQ HOSP IP/OBS HIGH 50: CPT | Performed by: STUDENT IN AN ORGANIZED HEALTH CARE EDUCATION/TRAINING PROGRAM

## 2025-05-23 PROCEDURE — 97112 NEUROMUSCULAR REEDUCATION: CPT

## 2025-05-23 PROCEDURE — 99232 SBSQ HOSP IP/OBS MODERATE 35: CPT | Performed by: NURSE PRACTITIONER

## 2025-05-23 PROCEDURE — 97110 THERAPEUTIC EXERCISES: CPT

## 2025-05-23 PROCEDURE — 82948 REAGENT STRIP/BLOOD GLUCOSE: CPT

## 2025-05-23 PROCEDURE — 3E0330M INTRODUCTION OF MONOCLONAL ANTIBODY INTO PERIPHERAL VEIN, PERCUTANEOUS APPROACH: ICD-10-PCS | Performed by: INTERNAL MEDICINE

## 2025-05-23 PROCEDURE — 97130 THER IVNTJ EA ADDL 15 MIN: CPT

## 2025-05-23 PROCEDURE — 80053 COMPREHEN METABOLIC PANEL: CPT | Performed by: NURSE PRACTITIONER

## 2025-05-23 PROCEDURE — 81003 URINALYSIS AUTO W/O SCOPE: CPT | Performed by: INTERNAL MEDICINE

## 2025-05-23 PROCEDURE — 85027 COMPLETE CBC AUTOMATED: CPT | Performed by: INTERNAL MEDICINE

## 2025-05-23 PROCEDURE — 80204 DRUG ASSAY METHOTREXATE: CPT

## 2025-05-23 PROCEDURE — 97129 THER IVNTJ 1ST 15 MIN: CPT

## 2025-05-23 PROCEDURE — 97530 THERAPEUTIC ACTIVITIES: CPT

## 2025-05-23 PROCEDURE — 80053 COMPREHEN METABOLIC PANEL: CPT | Performed by: INTERNAL MEDICINE

## 2025-05-23 PROCEDURE — G0545 PR INHERENT VISIT TO INPT: HCPCS | Performed by: INTERNAL MEDICINE

## 2025-05-23 RX ORDER — LEUCOVORIN CALCIUM 25 MG/1
25 TABLET ORAL EVERY 6 HOURS
Status: DISCONTINUED | OUTPATIENT
Start: 2025-05-23 | End: 2025-05-23

## 2025-05-23 RX ORDER — SODIUM CHLORIDE, SODIUM GLUCONATE, SODIUM ACETATE, POTASSIUM CHLORIDE, MAGNESIUM CHLORIDE, SODIUM PHOSPHATE, DIBASIC, AND POTASSIUM PHOSPHATE .53; .5; .37; .037; .03; .012; .00082 G/100ML; G/100ML; G/100ML; G/100ML; G/100ML; G/100ML; G/100ML
75 INJECTION, SOLUTION INTRAVENOUS CONTINUOUS
Status: DISCONTINUED | OUTPATIENT
Start: 2025-05-23 | End: 2025-05-24

## 2025-05-23 RX ORDER — LEUCOVORIN CALCIUM 25 MG/1
25 TABLET ORAL EVERY 6 HOURS
Status: COMPLETED | OUTPATIENT
Start: 2025-05-24 | End: 2025-05-25

## 2025-05-23 RX ORDER — DEXAMETHASONE 2 MG/1
2 TABLET ORAL ONCE
Status: COMPLETED | OUTPATIENT
Start: 2025-05-23 | End: 2025-05-23

## 2025-05-23 RX ADMIN — INSULIN LISPRO 1 UNITS: 100 INJECTION, SOLUTION INTRAVENOUS; SUBCUTANEOUS at 17:29

## 2025-05-23 RX ADMIN — INSULIN LISPRO 1 UNITS: 100 INJECTION, SOLUTION INTRAVENOUS; SUBCUTANEOUS at 21:32

## 2025-05-23 RX ADMIN — SENNOSIDES 17.2 MG: 8.6 TABLET, FILM COATED ORAL at 11:21

## 2025-05-23 RX ADMIN — HEPARIN SODIUM 5000 UNITS: 5000 INJECTION INTRAVENOUS; SUBCUTANEOUS at 05:16

## 2025-05-23 RX ADMIN — CEFADROXIL 1000 MG: 500 CAPSULE ORAL at 21:29

## 2025-05-23 RX ADMIN — CHLORHEXIDINE GLUCONATE 15 ML: 1.2 SOLUTION ORAL at 21:28

## 2025-05-23 RX ADMIN — ALLOPURINOL 300 MG: 300 TABLET ORAL at 08:45

## 2025-05-23 RX ADMIN — Medication 6 MG: at 21:28

## 2025-05-23 RX ADMIN — LEUCOVORIN CALCIUM 25 MG: 50 INJECTION, POWDER, LYOPHILIZED, FOR SOLUTION INTRAMUSCULAR; INTRAVENOUS at 20:07

## 2025-05-23 RX ADMIN — SODIUM BICARBONATE 100 ML/HR: 84 INJECTION, SOLUTION INTRAVENOUS at 02:54

## 2025-05-23 RX ADMIN — DOCUSATE SODIUM 100 MG: 100 CAPSULE, LIQUID FILLED ORAL at 08:45

## 2025-05-23 RX ADMIN — CHLORHEXIDINE GLUCONATE 15 ML: 1.2 SOLUTION ORAL at 08:47

## 2025-05-23 RX ADMIN — METFORMIN ER 500 MG 1000 MG: 500 TABLET ORAL at 08:45

## 2025-05-23 RX ADMIN — ATORVASTATIN CALCIUM 20 MG: 20 TABLET, FILM COATED ORAL at 08:45

## 2025-05-23 RX ADMIN — INSULIN LISPRO 1 UNITS: 100 INJECTION, SOLUTION INTRAVENOUS; SUBCUTANEOUS at 11:22

## 2025-05-23 RX ADMIN — CEFADROXIL 1000 MG: 500 CAPSULE ORAL at 08:45

## 2025-05-23 RX ADMIN — RITUXIMAB 800 MG: 10 INJECTION, SOLUTION INTRAVENOUS at 14:05

## 2025-05-23 RX ADMIN — LEUCOVORIN CALCIUM 25 MG: 25 TABLET ORAL at 12:02

## 2025-05-23 RX ADMIN — PANTOPRAZOLE SODIUM 40 MG: 40 TABLET, DELAYED RELEASE ORAL at 05:16

## 2025-05-23 RX ADMIN — HEPARIN SODIUM 5000 UNITS: 5000 INJECTION INTRAVENOUS; SUBCUTANEOUS at 15:10

## 2025-05-23 RX ADMIN — QUETIAPINE 50 MG: 25 TABLET ORAL at 21:27

## 2025-05-23 RX ADMIN — DOCUSATE SODIUM 100 MG: 100 CAPSULE, LIQUID FILLED ORAL at 18:11

## 2025-05-23 RX ADMIN — LEUCOVORIN CALCIUM 25 MG: 50 INJECTION, POWDER, LYOPHILIZED, FOR SOLUTION INTRAMUSCULAR; INTRAVENOUS at 12:17

## 2025-05-23 RX ADMIN — DEXAMETHASONE 2 MG: 2 TABLET ORAL at 12:02

## 2025-05-23 RX ADMIN — SODIUM CHLORIDE, SODIUM GLUCONATE, SODIUM ACETATE, POTASSIUM CHLORIDE, MAGNESIUM CHLORIDE, SODIUM PHOSPHATE, DIBASIC, AND POTASSIUM PHOSPHATE 75 ML/HR: .53; .5; .37; .037; .03; .012; .00082 INJECTION, SOLUTION INTRAVENOUS at 10:02

## 2025-05-23 RX ADMIN — HEPARIN SODIUM 5000 UNITS: 5000 INJECTION INTRAVENOUS; SUBCUTANEOUS at 21:28

## 2025-05-23 RX ADMIN — LEUCOVORIN CALCIUM 25 MG: 50 INJECTION, POWDER, LYOPHILIZED, FOR SOLUTION INTRAMUSCULAR; INTRAVENOUS at 05:16

## 2025-05-23 RX ADMIN — QUETIAPINE 12.5 MG: 25 TABLET ORAL at 11:21

## 2025-05-23 RX ADMIN — SITAGLIPTIN 50 MG: 50 TABLET, FILM COATED ORAL at 08:45

## 2025-05-23 NOTE — ASSESSMENT & PLAN NOTE
"Patient with development of worsening confusion, and was seen in the ED on 3/24/2025.  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  Lymphoma/leukemia panel was nondiagnostic  CTH 4/3/25: concerning for worsening hydrocephalus.   Repeat LP 4/7/25 = undiagnostic again   MRI brain 4/11/25: \"Interval enlargement of the dominant left anterior basal ganglionic mass lesion with greater surrounding vasogenic edema and mass effect. 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  S/p EVD, self removed 4/26  S/p Keppra 500mg BID x 7 days for postoperative seizure ppx   Started on chemotherapy during hospital stay and is for weekly Rituximab and Methotrexate every 2 weeks  S/p Dexamethasone taper = LD was on 5/19/25  Maintain PICC line  Had Rituxan 5/10/25  Did get Filgrastim 5/17 and 5/18  Was due for Methotrexate on 5/17 but + sepsis w/bacteremia.   ID cleared pt to get chemo and he did get the Methotrexate 5/21/25 and Decadron 10 mg IV x 1, Zofran 16mg IV  Renal maintaining bicarb drip and pt getting q6hr UAs for urine pH --> bicarb drip now stopped 5/23 and now switched to Isolyte @75ml/hr.    Get Leucovorin q6hrs x 6 doses   To get Methotrexate levels until <0.10; yesterday's level on 5/22 was 5.1  For daily CBC, CMP = ordered for 5/24/25  Pt has an appt with Dr Phan on 6/3/25   "

## 2025-05-23 NOTE — ASSESSMENT & PLAN NOTE
64 yoM with PMHx of DM2, NAFLD, and HT who presented with progressive encephalopathy found to have multiple scattered nodular cerebral and cerebellar enhancement who underwent two non-diagnostic LPs prompting stereotactic brain biopsy (4/14) which showed large B-cell lymphoma. See oncology progress note on 4/21 for full diagnostic work up. On 4/29, agitation led to infectious work up with showed UA+ and BCx with 2/2 E. Coli sensitive to ceftriaxone. ID was consulted is managing and has approved to restart chemo 5/2.      Treatment Plan: regimen modified from (https://pubmed.ncbi.nlm.nih.gov/81090586/)     High-dose methotrexate every 2 weeks x4 cycles followed by every 4 weeks maintenance (could consider dosing every 4 weeks if he discharges to outside rehab)  C1 (4/17) 8 g/m²  C2 (5/2) 3 g/m², dose-reduced due to LETY, delayed x1 day due to E. coli bacteremia.  C3 (was planned for 5/17) delayed due to E. coli bacteremia, s/p MTX on 5/21.    Recommendations:  Urine pH remained above 7.0.  24-hour post administration methotrexate 5.0, patient started on IV leucovorin, continue same.  Continue to monitor methotrexate level daily and urine pH until levels are less than 0.10.  Will also continue leucovorin rescue.  Patient had outpatient appointment with Dr. Phan on 5/23 that will be moved given delay in his treatment and discharge.  CBC and CMP reviewed from this morning, okay to proceed with Rituxan.  Mild transaminitis noted, no dose adjustment required for Rituxan.  Continue to monitor CBC and CMP daily until methotrexate levels cleared.

## 2025-05-23 NOTE — ASSESSMENT & PLAN NOTE
> Pet met sepsis criteria on 4/29 with confusion and agitation. UA was positive. Urine culture and blood cultures showed Ecoli sensitive to cephalosporins.  > ID consulted and pt completed 7d course of ceftriaxone  > Approved to restart chemo 5/2.  > Chemo on hold for GNR UTI and bacteremia > restarted chemo on 5/21    - Monitor while on antibiotics

## 2025-05-23 NOTE — ASSESSMENT & PLAN NOTE
5/19 - hemoglobin of 8.0 - improved from 6.7 after transfusion of 1u pRBC over the weekend  5/23- Hgb 9.9  -- improved from 8.9 yesterday

## 2025-05-23 NOTE — ASSESSMENT & PLAN NOTE
Hgb dropped to 6.3 on 5/18 and was confirmed by repeat lab  at 6.7 so was transfused with one unit of PRBCs after discussion with Oncology  Spoke with wife over the phone and consent obtained  On 5/19/25, hemoglobin was up to 8.0 = appropriate response to transfusion  Today 5/23/25, hemoglobin is 9.9.

## 2025-05-23 NOTE — PROGRESS NOTES
"Progress Note - Hospitalist   Name: Alexis Dennison 64 y.o. male I MRN: [id number]  Unit/Bed#: -01 I Date of Admission: 5/7/2025   Date of Service: 5/23/2025 I Hospital Day: 16    Assessment & Plan  Primary central nervous system (CNS) lymphoma  Patient with development of worsening confusion, and was seen in the ED on 3/24/2025.  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  Lymphoma/leukemia panel was nondiagnostic  CTH 4/3/25: concerning for worsening hydrocephalus.   Repeat LP 4/7/25 = undiagnostic again   MRI brain 4/11/25: \"Interval enlargement of the dominant left anterior basal ganglionic mass lesion with greater surrounding vasogenic edema and mass effect. 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  S/p EVD, self removed 4/26  S/p Keppra 500mg BID x 7 days for postoperative seizure ppx   Started on chemotherapy during hospital stay and is for weekly Rituximab and Methotrexate every 2 weeks  S/p Dexamethasone taper = LD was on 5/19/25  Maintain PICC line  Had Rituxan 5/10/25  Did get Filgrastim 5/17 and 5/18  Was due for Methotrexate on 5/17 but + sepsis w/bacteremia.   ID cleared pt to get chemo and he did get the Methotrexate 5/21/25 and Decadron 10 mg IV x 1, Zofran 16mg IV  Renal maintaining bicarb drip and pt getting q6hr UAs for urine pH --> bicarb drip now stopped 5/23 and now switched to Isolyte @75ml/hr.    Get Leucovorin q6hrs x 6 doses   To get " Methotrexate levels until <0.10; yesterday's level on 5/22 was 5.1  For daily CBC, CMP = ordered for 5/24/25  Pt has an appt with Dr Phan on 6/3/25   Type 2 diabetes mellitus with hyperglycemia, without long-term current use of insulin (HCC)  Hemoglobin A1C was 6.6 on 2/22/25  Sees [facility] in [place]  Home:  Janumet XR  qd  Here: Januvia 50mg qd  Continue DM diet  On QID Accuchecks with SSI Algo 1  BSs have continued to improve with steroid being tapered  = LD was 5/19 5/17 creat bumped up to 1.38 so Metformin was placed on hold  Creatinine is 1.24 today 5/23.    Since creatinine was stable, restarted Metformin 1000 mg XR to start 5/22/25. (Last contrast study was 5/18/25).  I d/w pharmacy here and there is no contraindication to restarting his Metformin with his recent chemo and getting Leucovorin   Stopped the Lispro WM 5/21/25.    No changes today 5/23/25.  Mixed hyperlipidemia  Continue atorvastatin  Did have slightly elevated LFT's on prior labs but most recently normal, consider holding statin if LFTs trend back up  Thyroid nodule  TSH/free T4 4/21/25 = 0.019/1.13  Repeat TSH/free T4 done 5/12 = TSH was 0.287 with free T4 0.85  TSH has normalized 5/21.  D/W [facility]o = [facility] -->  plan for OP US.  Pulmonary nodule  CT chest 3 months follow-up  Liver lesion  Patient will require outpatient follow-up  Encephalopathy  Continue Seroquel  S/p steroid taper- last dose was 5/19/25  On 1:1  LETY (acute kidney injury) (HCC)  Monitor status post methotrexate  Peak creat was 2.77 on 4/22/25  Previous baseline as OP 5/2023-2/22/25 was 1.1 to 1.0  CMP 5/9/25 with creatinine of 1.18 down from 1.23 on 5/8 and on 5/10 was 1.15  CMP on 5/12/25 showed creatinine stable at 1.1  5/17: creat up again to 1.38 = Being followed by Nephrology. Met sepsis criteria 5/17 and placed on IVF ordered as per sepsis protocol  5/19: creat trending down and was 1.20 -->  IVF was  reduced to 50ml/hr per renal.  On  "5/20/25-5/21/25, they want to continue same IVF  Creatinine today 5/23 is 1.24  For CMP 5/24/25  Sepsis (HCC)  Patient noted to have hypotension and tachycardia on 5/17  Sepsis workup ordered and found to have positive blood cultures again, growing E coli as before  ID reconsulted  Started Cefepime 5/17/25 = await blood urine cx results but preliminary results of urine >100,000 GNR and prelim blood cx = GNR with blood cx ID panel positive for E.coli  Continue IVF = renal managing  He has had stable BP, tachycardia has resolved and he has had no fevers  Updated plan from ID as below  Recurrent E. coli bacteremia  Patient completed 7 days of IV cefazolin which was finished on May 5  CT of A/P was done 5/18 per recommendation from ID looking for possible reason for recurrent bacteremia = was unrevealing  Was on Cetriaxone --> ID changed to Duricef to continue through 5/30/25 for a 14 days total of antibiotic tx  HTN (hypertension)  Home:  Losartan -25 qd  Here:  no meds   Stable BP today 5/23/25  Transaminitis  AST is 111, previously 114,  ALT is 322 previously 149  D/w H-O, they are aware =  \"Could be due to recent chemotherapy versus antibiotics versus liver lesions\".    CMP 5/9/25 = AST 57 (previously 111),  (previously 322)  Resolved  Leukopenia  resolved  Hyponatremia  resolved  Thrombocytopenia (HCC)  Per H-O  Platelets dropped to 66 on 5/18  On 5/19 was up to 79 --> 5/21 up to 144, 5/22 was 145 and today 153  For platelet transfusion if counts drop below 20K or in setting of any spontaneous bleeding   Anemia  Hgb dropped to 6.3 on 5/18 and was confirmed by repeat lab  at 6.7 so was transfused with one unit of PRBCs after discussion with Oncology  Spoke with wife over the phone and consent obtained  On 5/19/25, hemoglobin was up to 8.0 = appropriate response to transfusion  Today 5/23/25, hemoglobin is 9.9.  Ankle edema, bilateral  Mild  Add TEDS    The above assessment and plan was reviewed and " updated as determined by my evaluation of the patient on 5/23/2025.    History of Present Illness   Patient seen and examined. Patients overnight issues or events were reviewed with nursing staff. New or overnight issues include the following:   No new or overnight issues.  Offers no complaints    Review of Systems   All other systems reviewed and are negative.      Objective :  Temp:  [97.9 °F (36.6 °C)-98.1 °F (36.7 °C)] 98 °F (36.7 °C)  HR:  [67-72] 70  BP: (117-126)/(63-73) 126/73  Resp:  [16-18] 18  SpO2:  [96 %-98 %] 96 %  O2 Device: None (Room air)    Invasive Devices       Peripherally Inserted Central Catheter Line  Duration             PICC Line 05/01/25 Left Brachial 21 days                    Physical Exam  General Appearance: no distress, non toxic appearing  HEENT: PERRLA, conjuctiva normal; oropharynx clear; mucous membranes moist   Neck:  Supple, normal ROM  Lungs: CTA, normal respiratory effort, no retractions, expiratory effort normal  CV: regular rate and rhythm; no rubs/murmurs/gallops, PMI normal   ABD: soft; ND/NT; +BS  EXT: very mild ankle edema  Skin: normal turgor, normal texture  Psych: affect normal, mood normal  Neuro: AA        The above physical exam was reviewed and updated as determined by my evaluation of the patient on 5/23/2025.      Lab Results: I have reviewed the following results:  Results from last 7 days   Lab Units 05/23/25  1113 05/22/25  0613   WBC Thousand/uL 6.54 9.85   HEMOGLOBIN g/dL 9.9* 8.9*   HEMATOCRIT % 30.1* 25.7*   PLATELETS Thousands/uL 153 145*     Results from last 7 days   Lab Units 05/23/25  0529 05/22/25  0613   SODIUM mmol/L 142 138   POTASSIUM mmol/L 3.8 4.0   CHLORIDE mmol/L 99 100   CO2 mmol/L 36* 33*   BUN mg/dL 20 24   CREATININE mg/dL 1.24 1.26   CALCIUM mg/dL 8.5 8.4             Results from last 7 days   Lab Units 05/23/25  1029 05/23/25  0657 05/22/25  2048   POC GLUCOSE mg/dl 157* 122 147*       Imaging Results Review: No pertinent imaging  studies reviewed.  Other Study Results Review: No additional pertinent studies reviewed.    Review of Scheduled Meds: Medications  reviewed and reconciled as needed  Current Facility-Administered Medications   Medication Dose Route Frequency Provider Last Rate    acetaminophen  650 mg Oral Q6H PRN Crispin Arana MD      allopurinol  300 mg Oral Daily Crispin Arana MD      alteplase  2 mg Intracatheter Q1MIN PRN Crispin Arana MD      alteplase  2 mg Intracatheter Q1MIN PRN Magali Lee MD      alteplase  2 mg Intracatheter Q1MIN PRN Satnam Kebede MD      alteplase  2 mg Intracatheter Q1MIN PRN Satnam Kebede MD      alteplase  2 mg Intracatheter Q1MIN PRN Satnam Kebede MD      aluminum-magnesium hydroxide-simethicone  30 mL Oral Q4H PRN Crispin Arana MD      atorvastatin  20 mg Oral Daily Crispin Arana MD      bisacodyl  10 mg Rectal Daily PRN Skyler Hendrickson MD      calcium carbonate  1,000 mg Oral Daily PRN Crispin Arana MD      cefadroxil  1,000 mg Oral Q12H [facility] Lisa Singh MD      chlorhexidine  15 mL Mouth/Throat Q12H [facility] Crispin Arana MD      dexamethasone  2 mg Oral Once Nicki Renteria MD      docusate sodium  100 mg Oral BID Jessica Arreola DO      heparin (porcine)  5,000 Units Subcutaneous Q8H [facility] Crispin Arana MD      insulin lispro  1-5 Units Subcutaneous TID AC PATI Porras      insulin lispro  1-5 Units Subcutaneous HS PATI Porras      lactulose  20 g Oral Daily PRN Joshua Kaminski DO      leucovorin  25 mg Oral Q6H Satnam Kebede MD      leucovorin 25 mg in dextrose 5 % 50 mL IVPB  25 mg Intravenous Q6H Nicki Renteria MD Stopped (05/23/25 0610)    melatonin  6 mg Oral HS Crispin Arana MD      metFORMIN  1,000 mg Oral Daily PATI Porras      multi-electrolyte  75 mL/hr Intravenous Continuous Johnny Naik MD 75 mL/hr (05/23/25 1002)    OLANZapine  5 mg Intramuscular BID PRN Crispin Arana MD      ondansetron  4 mg Intravenous Q6H PRN Crispin Arana  MD      oxyCODONE  2.5 mg Oral Q4H PRN Crispin Arana MD      Or    oxyCODONE  5 mg Oral Q4H PRN Crispin Arana MD      pantoprazole  40 mg Oral Early Morning Crispin Arana MD      polyethylene glycol  17 g Oral Daily PRN Skyler Hendrickson MD      QUEtiapine  12.5 mg Oral Daily Crispin Arana MD      QUEtiapine  50 mg Oral HS Crispin Arana MD      riTUXimab (RITUXAN) 800 mg in sodium chloride 0.9 % 320 mL first titrated chemo infusion  800 mg Intravenous Once Satnam Kebede MD      senna  2 tablet Oral Daily With Lunch Jessica Arreola DO      sitaGLIPtin  50 mg Oral Daily PATI Porras      sodium chloride  20 mL/hr Intravenous Once PRN Satnam Kebede MD      sodium chloride  20 mL/hr Intravenous Once PRN Satnam Kebede MD         VTE Pharmacologic Prophylaxis: HSQ  Code Status: Level 1 - Full Code  Current Length of Stay: 16 day(s)    Administrative Statements     ** Please Note:  voice to text software may have been used in the creation of this document. Although proof errors in transcription or interpretation are a potential of such software**

## 2025-05-23 NOTE — ASSESSMENT & PLAN NOTE
5/19- Platelets down to 79K- will need to continue monitoring with chemotherapy now on hold  5/23- Plts improved to 153K from 145K

## 2025-05-23 NOTE — ASSESSMENT & PLAN NOTE
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  > Started on high dose steroids and methotrexate every 2 weeks for 4 weeks (C1 received 4/18, C2 received 5/2) then maintenance every 4 weeks with Rituximab weekly for 8 weeks (1st dose Thursday 4/24 and second 5/3)  > Admitted to [facility] with ongoing cognitive deficits and delirium  > Completed Decadron taper on 5/19    - MTX + Rituximab restarted on 5/21 with bicarb drip, managed by Onc/Nephro  - MTX at 5.10 today. Leucovorin rescue again today with monitoring of MTX level daily until it is less than 0.10 per Onc  - Onc to set-up outpatient Rituximab infusions for after discharge  - Daily methotrexate levels  - Hem/Onc following  - NSGY following  - SLP for cognition

## 2025-05-23 NOTE — ASSESSMENT & PLAN NOTE
Hemoglobin A1C was 6.6 on 2/22/25  Sees [facility] in [place]  Home:  Janumet XR  qd  Here: Januvia 50mg qd  Continue DM diet  On QID Accuchecks with SSI Algo 1  BSs have continued to improve with steroid being tapered  = LD was 5/19 5/17 creat bumped up to 1.38 so Metformin was placed on hold  Creatinine is 1.24 today 5/23.    Since creatinine was stable, restarted Metformin 1000 mg XR to start 5/22/25. (Last contrast study was 5/18/25).  I d/w pharmacy here and there is no contraindication to restarting his Metformin with his recent chemo and getting Leucovorin   Stopped the Lispro WM 5/21/25.    No changes today 5/23/25.

## 2025-05-23 NOTE — ASSESSMENT & PLAN NOTE
Per H-O  Platelets dropped to 66 on 5/18  On 5/19 was up to 79 --> 5/21 up to 144, 5/22 was 145 and today 153  For platelet transfusion if counts drop below 20K or in setting of any spontaneous bleeding

## 2025-05-23 NOTE — PROGRESS NOTES
Progress Note - Nephrology   Name: Alexis Dennison 64 y.o. male I MRN: [id number]  Unit/Bed#: -01 I Date of Admission: 5/7/2025   Date of Service: 5/23/2025 I Hospital Day: 16    Assessment & Plan  LETY (acute kidney injury) (HCC)  Baseline creatinine 0.8 to 1.1.   Etiology suspected to be due to prerenal azotemia/possible early ATN  Peak SCr 2.77 on 4/22/25.   Renal function improved and currently stable at creatinine 1.24 mg/dL despite chemotherapy with methotrexate on 5/21.  Patient has been receiving bicarb drip due to chemotherapy with methotrexate but bicarb level now has trended up to 36 and urine pH is elevated to 8.5.  Methotrexate level is on the higher side at 5.1.  Changed IV fluid to Isolyte but will continue at lower rate of 75 mL/h as she has developed some lower extremity edema.  Weight has been stable, continue to monitor.  Avoid nephrotoxins    HTN (hypertension)  BP stable and is at goal  Not on BP meds.  Avoid hypotension    Primary central nervous system (CNS) lymphoma  Hematology is following.     Complicated by obstructive hydrocephalus, status post EVD on 4/13 through 4/19.    Status post rituximab, status post high-dose methotrexate and intrathecal cytarabine on 4/17.  Repeat dose of rituximab on 5/10  Methotrexate was on hold earlier in the week due to suspected sepsis with blood culture positive for gram-negative katherine and urine culture positive for gram-negative katherine.  Seen by ID, status post IV antibiotics and now switched to Duricef on 5/20, was cleared for chemotherapy.    Patient received IV methotrexate on 5/21.  Continue to monitor renal function, continue IV fluid as mentioned above  Sepsis (HCC)  Blood culture urine culture positive for gram-negative rods-E. coli.  Urine culture was positive for E. coli was found to be tachycardic and hypotensive and had leukopenia.  Continue   antibiotic per infectious disease  Anemia  Status post PRBC, hemoglobin trended down to 8.9 g/dL today,  continue to monitor per primary team    I have reviewed the nephrology recommendations including stable and plan to change bicarb drip to Isolyte, with primary team, and we are in agreement with renal plan including the information outlined above.     Subjective   No new complaints.  No chest pain or shortness of breath    Objective :  Temp:  [97.9 °F (36.6 °C)-98.1 °F (36.7 °C)] 98 °F (36.7 °C)  HR:  [67-72] 70  BP: (117-126)/(63-73) 126/73  Resp:  [16-18] 18  SpO2:  [96 %-98 %] 96 %  O2 Device: None (Room air)    Current Weight: Weight - Scale: 79.4 kg (175 lb)  First Weight: Weight - Scale: 75.9 kg (167 lb 6.4 oz)  I/O         05/21 0701 05/22 0700 05/22 0701 05/23 0700 05/23 0701 05/24 0700    P.O. 240 610     Total Intake(mL/kg) 240 (3) 610 (7.7)     Urine (mL/kg/hr) 480 (0.3) 475 (0.2)     Total Output 480 475     Net -240 +135            Unmeasured Urine Occurrence 1 x 1 x           Physical Exam   General:  Ill looking, awake.  Eyes: Conjunctivae pink,  Sclera anicteric  ENT: lips and mucous membranes moist  Neck: supple   Chest: Clear to Auscultation both lungs,  no crackles, ronchus or wheezing.  CVS: S1 & S2 present, normal rate, regular rhythm, no murmur.  Abdomen: soft, non-tender, non-distended, Bowel sounds normoactive  Extremities: trace  edema of  legs  Skin: no rash  Neuro: awake, alert, oriented  Psych: Mood and affect appropriate    Medications:  Current Medications[1]      Lab Results: I have reviewed the following results:  Results from last 7 days   Lab Units 05/23/25  0529 05/22/25  0613 05/21/25  1033 05/20/25  0541 05/19/25  0559 05/18/25  0856 05/18/25  0543 05/17/25  0941 05/17/25  0537   WBC Thousand/uL  --  9.85 9.02  --  3.93* 1.84* 1.91* 1.26*  --    HEMOGLOBIN g/dL  --  8.9* 10.3*  --  8.0* 6.7* 6.3* 8.5*  --    HEMATOCRIT %  --  25.7* 32.0*  --  23.7* 20.1* 18.9* 24.1*  --    PLATELETS Thousands/uL  --  145* 144*  --  79* 66* 68* 85*  --    POTASSIUM mmol/L 3.8 4.0 3.8 3.5 3.9   "--  3.5  --  3.8   CHLORIDE mmol/L 99 100 105 106 107  --  105  --  92*   CO2 mmol/L 36* 33* 27 29 27  --  30  --  31   BUN mg/dL 20 24 19 19 19  --  20  --  25   CREATININE mg/dL 1.24 1.26 1.28 1.20 1.20  --  1.28  --  1.38*   CALCIUM mg/dL 8.5 8.4 9.1 8.3* 7.9*  --  7.4*  --  8.3*   ALBUMIN g/dL 3.2* 3.2* 3.5  --  2.8*  --  2.6*  --  3.4*       Administrative Statements     Portions of the record may have been created with voice recognition software. Occasional wrong word or \"sound a like\" substitutions may have occurred due to the inherent limitations of voice recognition software. Read the chart carefully and recognize, using context, where substitutions have occurred.If you have any questions, please contact the dictating provider.       [1]   Current Facility-Administered Medications:     acetaminophen (TYLENOL) tablet 650 mg, 650 mg, Oral, Q6H PRN, Crispin Arana MD, 650 mg at 05/16/25 0844    allopurinol (ZYLOPRIM) tablet 300 mg, 300 mg, Oral, Daily, Crispin Arana MD, 300 mg at 05/22/25 0836    alteplase (CATHFLO) injection 2 mg, 2 mg, Intracatheter, Q1MIN PRN, Crispin Arana MD    alteplase (CATHFLO) injection 2 mg, 2 mg, Intracatheter, Q1MIN PRN, Magali Lee MD    alteplase (CATHFLO) injection 2 mg, 2 mg, Intracatheter, Q1MIN PRN, Satnam Kebede MD    alteplase (CATHFLO) injection 2 mg, 2 mg, Intracatheter, Q1MIN PRN, Satnam Kebede MD    aluminum-magnesium hydroxide-simethicone (MAALOX) oral suspension 30 mL, 30 mL, Oral, Q4H PRN, Crispin Arana MD    atorvastatin (LIPITOR) tablet 20 mg, 20 mg, Oral, Daily, Crispin Arana MD, 20 mg at 05/22/25 0836    bisacodyl (DULCOLAX) rectal suppository 10 mg, 10 mg, Rectal, Daily PRN, Skyler Hendrickson MD    calcium carbonate (TUMS) chewable tablet 1,000 mg, 1,000 mg, Oral, Daily PRN, Crispin Arana MD    cefadroxil (DURICEF) capsule 1,000 mg, 1,000 mg, Oral, Q12H CAIT, Lisa Singh MD, 1,000 mg at 05/22/25 2123    chlorhexidine (PERIDEX) 0.12 % oral rinse 15 mL, " 15 mL, Mouth/Throat, Q12H [facility], Crispin Arana MD, 15 mL at 05/22/25 2122    docusate sodium (COLACE) capsule 100 mg, 100 mg, Oral, BID, Jessica Arreola DO, 100 mg at 05/22/25 1822    heparin (porcine) subcutaneous injection 5,000 Units, 5,000 Units, Subcutaneous, Q8H [facility], Crispin Arana MD, 5,000 Units at 05/23/25 0516    insulin lispro (HumALOG/ADMELOG) 100 units/mL subcutaneous injection 1-5 Units, 1-5 Units, Subcutaneous, TID AC, 2 Units at 05/22/25 1137 **AND** Fingerstick Glucose (POCT), , , TID AC, PATI Porras    insulin lispro (HumALOG/ADMELOG) 100 units/mL subcutaneous injection 1-5 Units, 1-5 Units, Subcutaneous, HS, PATI Porras, 2 Units at 05/21/25 2111    lactulose (CHRONULAC) oral solution 20 g, 20 g, Oral, Daily PRN, Joshua Kaminski DO, 20 g at 05/14/25 1805    leucovorin 25 mg in dextrose 5 % 50 mL IVPB, 25 mg, Intravenous, Q6H, Nicki Renteria MD, Stopped at 05/23/25 0610    melatonin tablet 6 mg, 6 mg, Oral, HS, Crispin Arana MD, 6 mg at 05/22/25 2122    metFORMIN (GLUCOPHAGE-XR) 24 hr tablet 1,000 mg, 1,000 mg, Oral, Daily, PATI Porras, 1,000 mg at 05/22/25 1100    OLANZapine (ZyPREXA) IM injection 5 mg, 5 mg, Intramuscular, BID PRN, Crispin Arana MD    ondansetron (ZOFRAN) injection 4 mg, 4 mg, Intravenous, Q6H PRN, Crispin Arana MD    oxyCODONE (ROXICODONE) split tablet 2.5 mg, 2.5 mg, Oral, Q4H PRN, 2.5 mg at 05/16/25 0844 **OR** oxyCODONE (ROXICODONE) IR tablet 5 mg, 5 mg, Oral, Q4H PRN, Crispin Arana MD, 5 mg at 05/18/25 0221    pantoprazole (PROTONIX) EC tablet 40 mg, 40 mg, Oral, Early Morning, Crispin Arana MD, 40 mg at 05/23/25 0516    polyethylene glycol (MIRALAX) packet 17 g, 17 g, Oral, Daily PRN, Skyler Hendrickson MD    QUEtiapine (SEROquel) tablet 12.5 mg, 12.5 mg, Oral, Daily, Crispin Arana MD, 12.5 mg at 05/22/25 1135    QUEtiapine (SEROquel) tablet 50 mg, 50 mg, Oral, HS, Crispin Arana MD, 50 mg at 05/22/25 2122    senna  (SENOKOT) tablet 17.2 mg, 2 tablet, Oral, Daily With Lunch, Jessica Arreola DO, 17.2 mg at 05/22/25 1135    sitaGLIPtin (JANUVIA) tablet 50 mg, 50 mg, Oral, Daily, PATI Porras, 50 mg at 05/22/25 0836    sodium bicarbonate 150 mEq in dextrose 5 % 1,000 mL infusion, 100 mL/hr, Intravenous, Continuous, Johnny Naik MD, Last Rate: 100 mL/hr at 05/23/25 0254, 100 mL/hr at 05/23/25 0254    sodium chloride 0.9 % infusion, 20 mL/hr, Intravenous, Once PRN, Satnam Kebede MD    sodium chloride 0.9 % infusion, 20 mL/hr, Intravenous, Once PRN, Satnam Kebede MD

## 2025-05-23 NOTE — PROGRESS NOTES
Progress Note - Oncology-Medical   Name: Alexis Dennison 64 y.o. male I MRN: [id number]  Unit/Bed#: -01 I Date of Admission: 5/7/2025   Date of Service: 5/23/2025 I Hospital Day: 16     Assessment & Plan  Primary central nervous system (CNS) lymphoma  64 yoM with PMHx of DM2, NAFLD, and HT who presented with progressive encephalopathy found to have multiple scattered nodular cerebral and cerebellar enhancement who underwent two non-diagnostic LPs prompting stereotactic brain biopsy (4/14) which showed large B-cell lymphoma. See oncology progress note on 4/21 for full diagnostic work up. On 4/29, agitation led to infectious work up with showed UA+ and BCx with 2/2 E. Coli sensitive to ceftriaxone. ID was consulted is managing and has approved to restart chemo 5/2.      Treatment Plan: regimen modified from (https://pubmed.ncbi.nlm.nih.gov/00445450/)     High-dose methotrexate every 2 weeks x4 cycles followed by every 4 weeks maintenance (could consider dosing every 4 weeks if he discharges to outside rehab)  C1 (4/17) 8 g/m²  C2 (5/2) 3 g/m², dose-reduced due to LETY, delayed x1 day due to E. coli bacteremia.  C3 (was planned for 5/17) delayed due to E. coli bacteremia, s/p MTX on 5/21.    Recommendations:  Urine pH remained above 7.0.  24-hour post administration methotrexate 5.0, patient started on IV leucovorin, continue same.  Continue to monitor methotrexate level daily and urine pH until levels are less than 0.10.  Will also continue leucovorin rescue.  Patient had outpatient appointment with Dr. Phan on 5/23 that will be moved given delay in his treatment and discharge.  CBC and CMP reviewed from this morning, okay to proceed with Rituxan.  Mild transaminitis noted, no dose adjustment required for Rituxan.  Continue to monitor CBC and CMP daily until methotrexate levels cleared.  Thyroid nodule  Patient incidentally found to have thyroid nodule, was recommended ultrasound thyroid.  Pulmonary nodule  CTA  from 3/29/2025 with nonspecific 4 mm right lower lobe pulmonary nodule, recommended 3 months follow-up.  Transaminitis  Patient noted with transaminitis, LFTs has been fluctuating,   Could be due to recent chemotherapy versus antibiotics versus liver lesions.  CMP from this morning within normal limit.  Thrombocytopenia (HCC)  Patient does have thrombocytopenia at baseline but usually his platelet counts have been above 100 K,  This morning noted with worsening thrombocytopenia, suspected underlying infectious etiology.  Continue to monitor CBC daily, will need platelet transfusion if counts drop below 20K or in setting of any spontaneous bleeding.  Anemia  Likely multifactorial, hemoglobin has been in range of 9.  Will continue to monitor.      Outpatient follow up plan: Outpatient appointment rescheduled for 6/3/2025.    Communication with patient/family:  I did update the patient.  Also updated patient's wife at bedside.    Communication with team:  Did communicate with  primary team. I also spoke with pharmacy regarding methotrexate      I did review this patient with Dr. Roberson and they are in agreement with this plan.          Subjective:  Patient seen at bedside, reports feeling better this morning.    Review of Systems   Constitutional:  Negative for appetite change, fatigue and unexpected weight change.   Respiratory:  Negative for chest tightness and shortness of breath.    Cardiovascular:  Negative for chest pain and palpitations.   Gastrointestinal:  Negative for abdominal pain, constipation and vomiting.   Genitourinary:  Negative for enuresis and frequency.   Musculoskeletal:  Negative for arthralgias and joint swelling.   Skin:  Negative for color change and rash.   Neurological:  Negative for dizziness and facial asymmetry.   All other systems reviewed and are negative.         Objective:     Medication Administration - last 24 hours from 05/22/2025 1233 to 05/23/2025 1233         Date/Time Order  Dose Route Action Action by     05/23/2025 0845 EDT atorvastatin (LIPITOR) tablet 20 mg 20 mg Oral Given Aida Buchanan, RN     05/23/2025 0847 EDT chlorhexidine (PERIDEX) 0.12 % oral rinse 15 mL 15 mL Mouth/Throat Given Aida Buchanan, RN     05/22/2025 2122 EDT chlorhexidine (PERIDEX) 0.12 % oral rinse 15 mL 15 mL Mouth/Throat Given Maribell Catherine, RN     05/23/2025 0516 EDT pantoprazole (PROTONIX) EC tablet 40 mg 40 mg Oral Given Leslye Roger RN     05/23/2025 0845 EDT allopurinol (ZYLOPRIM) tablet 300 mg 300 mg Oral Given Aida Buchanan, RN     05/23/2025 0516 EDT heparin (porcine) subcutaneous injection 5,000 Units 5,000 Units Subcutaneous Given Leslye Roger RN     05/22/2025 2122 EDT heparin (porcine) subcutaneous injection 5,000 Units 5,000 Units Subcutaneous Given Maribell Catherine RN     05/22/2025 1349 EDT heparin (porcine) subcutaneous injection 5,000 Units 5,000 Units Subcutaneous Given Aida Buchanan RN     05/22/2025 2122 EDT melatonin tablet 6 mg 6 mg Oral Given Maribell Catherine, RN     05/23/2025 1121 EDT QUEtiapine (SEROquel) tablet 12.5 mg 12.5 mg Oral Given Aida Buchanan, RN     05/22/2025 2122 EDT QUEtiapine (SEROquel) tablet 50 mg 50 mg Oral Given Maribell Catherine, RN     05/23/2025 0845 EDT docusate sodium (COLACE) capsule 100 mg 100 mg Oral Given Aida Buchanan, RN     05/22/2025 1822 EDT docusate sodium (COLACE) capsule 100 mg 100 mg Oral Given Norma Padron, RN     05/23/2025 1121 EDT senna (SENOKOT) tablet 17.2 mg 17.2 mg Oral Given Aida Buchanan, RN     05/23/2025 0845 EDT sitaGLIPtin (JANUVIA) tablet 50 mg 50 mg Oral Given Aidaromán Buchanan RN     05/23/2025 1122 EDT insulin lispro (HumALOG/ADMELOG) 100 units/mL subcutaneous injection 1-5 Units 1 Units Subcutaneous Given Aida Buchanan RN     05/23/2025 0805 EDT insulin lispro (HumALOG/ADMELOG) 100 units/mL subcutaneous injection 1-5 Units -- Subcutaneous Not Given Aiad Buchanan RN     05/22/2025 1617 EDT insulin lispro  (HumALOG/ADMELOG) 100 units/mL subcutaneous injection 1-5 Units -- Subcutaneous Not Given Norma Padron, ADELINE     05/22/2025 2123 EDT insulin lispro (HumALOG/ADMELOG) 100 units/mL subcutaneous injection 1-5 Units -- Subcutaneous Not Given Maribell Catherine, ADELINE     05/22/2025 1537 EDT acetaminophen (TYLENOL) tablet 650 mg 650 mg Oral Given Aida Buchanan RN     05/22/2025 1539 EDT diphenhydrAMINE (BENADRYL) injection 25 mg 25 mg Intravenous Given Aida Buchanan RN     05/23/2025 1202 EDT leucovorin (WELLCOVORIN) tablet 25 mg 25 mg Oral Given Aida Buchanan RN     05/23/2025 0845 EDT cefadroxil (DURICEF) capsule 1,000 mg 1,000 mg Oral Given Aida Buchanan RN     05/22/2025 2123 EDT cefadroxil (DURICEF) capsule 1,000 mg 1,000 mg Oral Given Maribell Catherine RN     05/23/2025 0848 EDT sodium bicarbonate 150 mEq in dextrose 5 % 1,000 mL infusion 0 mL/hr Intravenous Stopped Aida Buchanan RN     05/23/2025 0254 EDT sodium bicarbonate 150 mEq in dextrose 5 % 1,000 mL infusion 100 mL/hr Intravenous New Bag Maribell Catherine RN     05/22/2025 1444 EDT sodium bicarbonate 150 mEq in dextrose 5 % 1,000 mL infusion 100 mL/hr Intravenous New Bag Bina Lugo, ADELINE     05/23/2025 0845 EDT metFORMIN (GLUCOPHAGE-XR) 24 hr tablet 1,000 mg 1,000 mg Oral Given Aida Buchanan RN     05/23/2025 1217 EDT leucovorin 25 mg in dextrose 5 % 50 mL IVPB 25 mg Intravenous New Bag Aida Buchanan RN     05/23/2025 0610 EDT leucovorin 25 mg in dextrose 5 % 50 mL IVPB 0 mg Intravenous Stopped Leslye Roger, ADELINE     05/23/2025 0516 EDT leucovorin 25 mg in dextrose 5 % 50 mL IVPB 25 mg Intravenous New Bag Leslye Roger RN     05/22/2025 2330 EDT leucovorin 25 mg in dextrose 5 % 50 mL IVPB -- Intravenous Canceled Entry Maribell Catherine RN     05/22/2025 2124 EDT leucovorin 25 mg in dextrose 5 % 50 mL IVPB 25 mg Intravenous New Bag Maribell Catherine RN     05/23/2025 1002 EDT multi-electrolyte (Plasmalyte-A/Isolyte-S PH 7.4/Normosol-R) IV  "solution 75 mL/hr Intravenous New Bag Aida Buchanan RN     05/23/2025 1202 EDT dexamethasone (DECADRON) tablet 2 mg 2 mg Oral Given Aida Buchanan RN            /73 (BP Location: Right arm)   Pulse 70   Temp 98 °F (36.7 °C) (Oral)   Resp 18   Ht 5' 10\" (1.778 m)   Wt 79.4 kg (175 lb 0.7 oz)   SpO2 96%   BMI 25.12 kg/m²       Physical Exam  Constitutional:       Appearance: Normal appearance.   HENT:      Head: Normocephalic and atraumatic.     Cardiovascular:      Rate and Rhythm: Normal rate and regular rhythm.      Pulses: Normal pulses.      Heart sounds: Normal heart sounds.   Pulmonary:      Effort: Pulmonary effort is normal.      Breath sounds: Normal breath sounds.   Abdominal:      General: Abdomen is flat.      Palpations: Abdomen is soft.     Skin:     General: Skin is warm and dry.     Neurological:      Mental Status: He is alert.           Recent Results (from the past 48 hours)   UA (URINE) with reflex to Scope    Collection Time: 05/21/25 12:42 PM   Result Value Ref Range    Color, UA Light Yellow     Clarity, UA Clear     Specific Gravity, UA 1.016 1.003 - 1.030    pH, UA 5.5 4.5, 5.0, 5.5, 6.0, 6.5, 7.0, 7.5, 8.0    Leukocytes, UA Negative Negative    Nitrite, UA Negative Negative    Protein, UA Negative Negative mg/dl    Glucose, UA Negative Negative mg/dl    Ketones, UA Negative Negative mg/dl    Urobilinogen, UA <2.0 <2.0 mg/dl mg/dl    Bilirubin, UA Negative Negative    Occult Blood, UA Negative Negative   Fingerstick Glucose (POCT)    Collection Time: 05/21/25  4:20 PM   Result Value Ref Range    POC Glucose 208 (H) 65 - 140 mg/dl   Fingerstick Glucose (POCT)    Collection Time: 05/21/25  8:45 PM   Result Value Ref Range    POC Glucose 264 (H) 65 - 140 mg/dl   UA (URINE) with reflex to Scope    Collection Time: 05/21/25 10:10 PM   Result Value Ref Range    Color, UA Light Yellow     Clarity, UA Clear     Specific Gravity, UA 1.008 1.003 - 1.030    pH, UA 8.0 4.5, 5.0, 5.5, 6.0, " 6.5, 7.0, 7.5, 8.0    Leukocytes, UA Negative Negative    Nitrite, UA Negative Negative    Protein, UA 70 (1+) (A) Negative mg/dl    Glucose,  (3/10%) (A) Negative mg/dl    Ketones, UA Negative Negative mg/dl    Urobilinogen, UA <2.0 <2.0 mg/dl mg/dl    Bilirubin, UA Negative Negative    Occult Blood, UA Negative Negative   Urine Microscopic    Collection Time: 05/21/25 10:10 PM   Result Value Ref Range    RBC, UA None Seen None Seen, 1-2 /hpf    WBC, UA 1-2 None Seen, 1-2 /hpf    Epithelial Cells None Seen None Seen, Occasional /hpf    Bacteria, UA None Seen None Seen, Occasional /hpf   UA (URINE) with reflex to Scope    Collection Time: 05/22/25  4:02 AM   Result Value Ref Range    Color, UA Yellow     Clarity, UA Clear     Specific Gravity, UA 1.013 1.003 - 1.030    pH, UA 8.0 4.5, 5.0, 5.5, 6.0, 6.5, 7.0, 7.5, 8.0    Leukocytes, UA Negative Negative    Nitrite, UA Negative Negative    Protein, UA 50 (1+) (A) Negative mg/dl    Glucose,  (1/10%) (A) Negative mg/dl    Ketones, UA Negative Negative mg/dl    Urobilinogen, UA <2.0 <2.0 mg/dl mg/dl    Bilirubin, UA Negative Negative    Occult Blood, UA Negative Negative   Urine Microscopic    Collection Time: 05/22/25  4:02 AM   Result Value Ref Range    RBC, UA 1-2 None Seen, 1-2 /hpf    WBC, UA 1-2 None Seen, 1-2 /hpf    Epithelial Cells None Seen None Seen, Occasional /hpf    Bacteria, UA None Seen None Seen, Occasional /hpf    MUCUS THREADS Occasional (A) None Seen    Amorphous Crystals, UA Occasional    CBC and differential    Collection Time: 05/22/25  6:13 AM   Result Value Ref Range    WBC 9.85 4.31 - 10.16 Thousand/uL    RBC 2.87 (L) 3.88 - 5.62 Million/uL    Hemoglobin 8.9 (L) 12.0 - 17.0 g/dL    Hematocrit 25.7 (L) 36.5 - 49.3 %    MCV 90 82 - 98 fL    MCH 31.0 26.8 - 34.3 pg    MCHC 34.6 31.4 - 37.4 g/dL    RDW 14.6 11.6 - 15.1 %    MPV 10.6 8.9 - 12.7 fL    Platelets 145 (L) 149 - 390 Thousands/uL   Comprehensive metabolic panel    Collection  Time: 05/22/25  6:13 AM   Result Value Ref Range    Sodium 138 135 - 147 mmol/L    Potassium 4.0 3.5 - 5.3 mmol/L    Chloride 100 96 - 108 mmol/L    CO2 33 (H) 21 - 32 mmol/L    ANION GAP 5 4 - 13 mmol/L    BUN 24 5 - 25 mg/dL    Creatinine 1.26 0.60 - 1.30 mg/dL    Glucose 187 (H) 65 - 140 mg/dL    Glucose, Fasting 187 (H) 65 - 99 mg/dL    Calcium 8.4 8.4 - 10.2 mg/dL    Corrected Calcium 9.0 8.3 - 10.1 mg/dL    AST 16 13 - 39 U/L    ALT 32 7 - 52 U/L    Alkaline Phosphatase 58 34 - 104 U/L    Total Protein 5.3 (L) 6.4 - 8.4 g/dL    Albumin 3.2 (L) 3.5 - 5.0 g/dL    Total Bilirubin 0.74 0.20 - 1.00 mg/dL    eGFR 59 ml/min/1.73sq m   Manual Differential(PHLEBS Do Not Order)    Collection Time: 05/22/25  6:13 AM   Result Value Ref Range    Segmented % 65 43 - 75 %    Bands % 10 (H) 0 - 8 %    Lymphocytes % 16 14 - 44 %    Monocytes % 5 4 - 12 %    Eosinophils % 0 0 - 6 %    Basophils % 0 0 - 1 %    Metamyelocytes % 2 (H) 0 - 1 %    Atypical Lymphocytes % 2 (H) <=0 %    Absolute Neutrophils 7.39 1.85 - 7.62 Thousand/uL    Absolute Lymphocytes 1.77 0.60 - 4.47 Thousand/uL    Absolute Monocytes 0.49 0.00 - 1.22 Thousand/uL    Absolute Eosinophils 0.00 0.00 - 0.40 Thousand/uL    Absolute Basophils 0.00 0.00 - 0.10 Thousand/uL    Absolute Metamyelocytes 0.20 (H) 0.00 - 0.10 Thousand/uL    Total Counted      nRBC 2 0 - 2 /100 WBC    RBC Morphology Present     Platelet Estimate Adequate Adequate    Anisocytosis Present     Microcytes Present     Polychromasia Present    Fingerstick Glucose (POCT)    Collection Time: 05/22/25  6:26 AM   Result Value Ref Range    POC Glucose 193 (H) 65 - 140 mg/dl   UA (URINE) with reflex to Scope    Collection Time: 05/22/25 10:04 AM   Result Value Ref Range    Color, UA Light Yellow     Clarity, UA Clear     Specific Gravity, UA 1.013 1.003 - 1.030    pH, UA 7.5 4.5, 5.0, 5.5, 6.0, 6.5, 7.0, 7.5, 8.0    Leukocytes, UA Negative Negative    Nitrite, UA Negative Negative    Protein, UA Trace  (A) Negative mg/dl    Glucose, UA Negative Negative mg/dl    Ketones, UA Negative Negative mg/dl    Urobilinogen, UA <2.0 <2.0 mg/dl mg/dl    Bilirubin, UA Negative Negative    Occult Blood, UA Negative Negative   Urine Microscopic    Collection Time: 05/22/25 10:04 AM   Result Value Ref Range    RBC, UA 1-2 None Seen, 1-2 /hpf    WBC, UA 1-2 None Seen, 1-2 /hpf    Epithelial Cells None Seen None Seen, Occasional /hpf    Bacteria, UA None Seen None Seen, Occasional /hpf   Fingerstick Glucose (POCT)    Collection Time: 05/22/25 10:39 AM   Result Value Ref Range    POC Glucose 226 (H) 65 - 140 mg/dl   UA (URINE) with reflex to Scope    Collection Time: 05/22/25  3:27 PM   Result Value Ref Range    Color, UA Colorless     Clarity, UA Clear     Specific Gravity, UA 1.020 1.005 - 1.030    pH, UA 8.0 4.5, 5.0, 5.5, 6.0, 6.5, 7.0, 7.5, 8.0    Leukocytes, UA Negative Negative    Nitrite, UA Negative Negative    Protein, UA Negative Negative mg/dl    Glucose, UA Negative Negative mg/dl    Ketones, UA Negative Negative mg/dl    Urobilinogen, UA <2.0 <2.0 mg/dl mg/dl    Bilirubin, UA Negative Negative    Occult Blood, UA Negative Negative   Fingerstick Glucose (POCT)    Collection Time: 05/22/25  4:06 PM   Result Value Ref Range    POC Glucose 128 65 - 140 mg/dl   Methotrexate level    Collection Time: 05/22/25  4:28 PM   Result Value Ref Range    Methotrexate Lvl 5.10 umol/L   Fingerstick Glucose (POCT)    Collection Time: 05/22/25  8:48 PM   Result Value Ref Range    POC Glucose 147 (H) 65 - 140 mg/dl   UA (URINE) with reflex to Scope    Collection Time: 05/22/25  9:41 PM   Result Value Ref Range    Color, UA Colorless     Clarity, UA Clear     Specific Gravity, UA 1.009 1.003 - 1.030    pH, UA 8.0 4.5, 5.0, 5.5, 6.0, 6.5, 7.0, 7.5, 8.0    Leukocytes, UA Negative Negative    Nitrite, UA Negative Negative    Protein, UA Negative Negative mg/dl    Glucose, UA Negative Negative mg/dl    Ketones, UA Negative Negative mg/dl     Urobilinogen, UA <2.0 <2.0 mg/dl mg/dl    Bilirubin, UA Negative Negative    Occult Blood, UA Negative Negative   UA (URINE) with reflex to Scope    Collection Time: 05/23/25  3:44 AM   Result Value Ref Range    Color, UA Colorless     Clarity, UA Clear     Specific Gravity, UA 1.009 1.003 - 1.030    pH, UA 8.5 (A) 4.5, 5.0, 5.5, 6.0, 6.5, 7.0, 7.5, 8.0    Leukocytes, UA Negative Negative    Nitrite, UA Negative Negative    Protein, UA Negative Negative mg/dl    Glucose, UA Negative Negative mg/dl    Ketones, UA Negative Negative mg/dl    Urobilinogen, UA <2.0 <2.0 mg/dl mg/dl    Bilirubin, UA Negative Negative    Occult Blood, UA Negative Negative   Comprehensive metabolic panel    Collection Time: 05/23/25  5:29 AM   Result Value Ref Range    Sodium 142 135 - 147 mmol/L    Potassium 3.8 3.5 - 5.3 mmol/L    Chloride 99 96 - 108 mmol/L    CO2 36 (H) 21 - 32 mmol/L    ANION GAP 7 4 - 13 mmol/L    BUN 20 5 - 25 mg/dL    Creatinine 1.24 0.60 - 1.30 mg/dL    Glucose 114 65 - 140 mg/dL    Glucose, Fasting 114 (H) 65 - 99 mg/dL    Calcium 8.5 8.4 - 10.2 mg/dL    Corrected Calcium 9.1 8.3 - 10.1 mg/dL     (H) 13 - 39 U/L     (H) 7 - 52 U/L    Alkaline Phosphatase 63 34 - 104 U/L    Total Protein 5.5 (L) 6.4 - 8.4 g/dL    Albumin 3.2 (L) 3.5 - 5.0 g/dL    Total Bilirubin 0.49 0.20 - 1.00 mg/dL    eGFR 60 ml/min/1.73sq m   Fingerstick Glucose (POCT)    Collection Time: 05/23/25  6:57 AM   Result Value Ref Range    POC Glucose 122 65 - 140 mg/dl   Fingerstick Glucose (POCT)    Collection Time: 05/23/25 10:29 AM   Result Value Ref Range    POC Glucose 157 (H) 65 - 140 mg/dl   CBC and differential    Collection Time: 05/23/25 11:13 AM   Result Value Ref Range    WBC 6.54 4.31 - 10.16 Thousand/uL    RBC 3.20 (L) 3.88 - 5.62 Million/uL    Hemoglobin 9.9 (L) 12.0 - 17.0 g/dL    Hematocrit 30.1 (L) 36.5 - 49.3 %    MCV 94 82 - 98 fL    MCH 30.9 26.8 - 34.3 pg    MCHC 32.9 31.4 - 37.4 g/dL    RDW 15.4 (H) 11.6 - 15.1 %     MPV 10.5 8.9 - 12.7 fL    Platelets 153 149 - 390 Thousands/uL   Comprehensive metabolic panel    Collection Time: 05/23/25 11:13 AM   Result Value Ref Range    Sodium 139 135 - 147 mmol/L    Potassium 3.7 3.5 - 5.3 mmol/L    Chloride 98 96 - 108 mmol/L    CO2 34 (H) 21 - 32 mmol/L    ANION GAP 7 4 - 13 mmol/L    BUN 19 5 - 25 mg/dL    Creatinine 1.23 0.60 - 1.30 mg/dL    Glucose 162 (H) 65 - 140 mg/dL    Calcium 8.8 8.4 - 10.2 mg/dL    AST 89 (H) 13 - 39 U/L     (H) 7 - 52 U/L    Alkaline Phosphatase 64 34 - 104 U/L    Total Protein 5.9 (L) 6.4 - 8.4 g/dL    Albumin 3.5 3.5 - 5.0 g/dL    Total Bilirubin 0.46 0.20 - 1.00 mg/dL    eGFR 61 ml/min/1.73sq m       CT abdomen pelvis w contrast  Result Date: 5/19/2025  Narrative: CT ABDOMEN AND PELVIS WITH IV CONTRAST INDICATION: recurrent bacteremia, look for source. Blood culture and urine culture positive for gram-negative rods. COMPARISON: April 30, 2025. TECHNIQUE: CT examination of the abdomen and pelvis was performed. Multiplanar 2D reformatted images were created from the source data. This examination, like all CT scans performed in the [facility], was performed utilizing techniques to minimize radiation dose exposure, including the use of iterative reconstruction and automated exposure control. Radiation dose length product (DLP) for this visit: 390.85 mGy-cm. IV Contrast: 85 mL of iohexol (OMNIPAQUE) Enteric Contrast: Not administered. FINDINGS: ABDOMEN LOWER CHEST: A small calcified granuloma is redemonstrated anteriorly at the right lung base. There is coronary artery disease. A catheter terminates in the right atrium. LIVER/BILIARY TREE: There is a 13 x 9 mm cyst posteriorly in the right lobe of the liver, similar to the prior study. GALLBLADDER: No calcified gallstones. No pericholecystic inflammatory change. SPLEEN: Unremarkable. PANCREAS: Unremarkable. ADRENAL GLANDS: Unremarkable. KIDNEYS/URETERS: Mild bilateral nonspecific  perinephric stranding, increased somewhat compared with April 30, 2025. No hydronephrosis bilaterally. STOMACH AND BOWEL: No bowel obstruction. No pericolonic inflammatory change. APPENDIX: Normal. ABDOMINOPELVIC CAVITY: There is a small amount of free fluid, best demonstrated in the right lower quadrant and paracolic gutters. No pneumoperitoneum. VESSELS: Atherosclerosis. No abdominal aortic aneurysm. PELVIS REPRODUCTIVE ORGANS: Unremarkable for patient's age. URINARY BLADDER: Unremarkable. ABDOMINAL WALL/INGUINAL REGIONS: There is some gas within the subcutaneous fat of the anterior abdominal walls near the level of the umbilicus, left more so than right, possibly related to recent injections. Small fat-containing left inguinal hernia. BONES: No acute fracture or suspicious osseous lesion. Spinal degenerative changes, most notably at L5-S1.     Impression: There is mild bilateral nonspecific perinephric stranding, however, this does appear somewhat increased compared with April 30, 2025. Correlation with urinalysis and urine culture and sensitivity is recommended. There is a small amount of free fluid. Other nonemergent findings as above. Workstation performed: [id number]     CT abdomen pelvis wo contrast  Result Date: 4/30/2025  Narrative: CT ABDOMEN AND PELVIS WITHOUT IV CONTRAST INDICATION: Gram-negative bacteremia, evaluate for source infection. COMPARISON: None. TECHNIQUE: CT examination of the abdomen and pelvis was performed without intravenous contrast. Multiplanar 2D reformatted images were created from the source data. This examination, like all CT scans performed [facility], was performed utilizing techniques to minimize radiation dose exposure, including the use of iterative reconstruction and automated exposure control. Radiation dose length product (DLP) for this visit: 560.94 mGy-cm. Enteric Contrast: Not administered. FINDINGS: ABDOMEN LOWER CHEST: Calcified granuloma in the  right middle lobe. LIVER/BILIARY TREE: 1 cm cyst in the right lobe. Liver is otherwise unremarkable.1 GALLBLADDER: No calcified gallstones. No pericholecystic inflammatory change. SPLEEN: Unremarkable. PANCREAS: Unremarkable. ADRENAL GLANDS: Unremarkable. KIDNEYS/URETERS: Unremarkable. No hydronephrosis. STOMACH AND BOWEL: Large amount of formed stool throughout the entire colon. No evidence of obstruction. APPENDIX: Normal. ABDOMINOPELVIC CAVITY: No ascites. No pneumoperitoneum. No lymphadenopathy. VESSELS: Unremarkable for patient's age. PELVIS REPRODUCTIVE ORGANS: Unremarkable for patient's age. URINARY BLADDER: Unremarkable. ABDOMINAL WALL/INGUINAL REGIONS: Mild infiltration of the anterior abdominal wall in the left lower quadrant, possibly a soft tissue contusion. BONES: No acute fracture or suspicious osseous lesion.     Impression: No acute inflammatory process in the abdomen or pelvis. Large amount of stool throughout the colon. Workstation performed: [id number]     XR abdomen obstruction series  Result Date: 4/30/2025  Narrative: XR ABDOMEN OBSTRUCTION SERIES INDICATION: Constipation, lethargy, gram-negative bacteremia. Confusion, newly diagnosed intracranial mass. Constipation. COMPARISON: MRI abdomen 3/30/2025 and CT chest abdomen pelvis 3/29/2025 FINDINGS: Large volume of colonic stool. Otherwise unremarkable bowel gas pattern. No pneumoperitoneum or abnormal air-fluid levels. No pathologic calcification or soft tissue mass. Bones are unremarkable for patient's age. Examination of the chest reveals clear lungs and a normal cardiomediastinal silhouette. Right PICC with tip projecting over the superior cavoatrial junction.     Impression: 1.  Large colonic stool burden without obstruction or other acute abdominal findings. 2.  No acute cardiopulmonary findings. Resident: CINTHYA FAJARDO I, the attending radiologist, have reviewed the images and agree with the final report above. Workstation performed:  [id number]       I have personally reviewed labs, imaging studies, and pertinent reports.      This note has been generated by voice recognition software system.  Therefore, there may be spelling, grammar, and or syntax errors. Please contact if questions arise.

## 2025-05-23 NOTE — PROGRESS NOTES
"   05/23/25 1230   Pain Assessment   Pain Score No Pain   Restrictions/Precautions   Precautions 1:1;Bed/chair alarms;Cognitive;Fall Risk;Limb alert;Supervision on toilet/commode;Multiple lines  (chemo precautions, IV active during session)   General   Change In Medical/Functional Status Pt. noted to have swelling on B ankles, CRNP Dec made aware and ordered for TEDS to be donned   Cognition   Arousal/Participation Alert;Cooperative   Attention Attends with cues to redirect   Memory Decreased long term memory;Decreased recall of biographical information;Decreased short term memory;Decreased recall of recent events;Decreased recall of precautions   Following Commands Follows one step commands without difficulty   Subjective   Subjective \"What are we going to do? \"   Roll Left and Right   Type of Assistance Needed Set-up / clean-up   Roll Left and Right CARE Score 5   Sit to Lying   Type of Assistance Needed Supervision   Sit to Lying CARE Score 4   Lying to Sitting on Side of Bed   Type of Assistance Needed Supervision   Lying to Sitting on Side of Bed CARE Score 4   Sit to Stand   Type of Assistance Needed Supervision   Comment no AD   Sit to Stand CARE Score 4   Bed-Chair Transfer   Type of Assistance Needed Supervision   Comment no AD   Chair/Bed-to-Chair Transfer CARE Score 4   Transfer Bed/Chair/Wheelchair   Adaptive Equipment None   Walk 10 Feet   Type of Assistance Needed Supervision   Comment S no AD   Walk 10 Feet CARE Score 4   Walk 50 Feet with Two Turns   Type of Assistance Needed Supervision   Comment S no AD   Walk 50 Feet with Two Turns CARE Score 4   Walk 150 Feet   Type of Assistance Needed Supervision   Comment S no AD   Walk 150 Feet CARE Score 4   Ambulation   Primary Mode of Locomotion Prior to Admission Walk   Distance Walked (feet) 250 ft  (X 2, 150 X 2)   Assist Device   (no AD)   Gait Pattern Slow Vicki;Improper weight shift   Walk Assist Level Supervision   Does the patient walk? 2. Yes "   Wheel 50 Feet with Two Turns   Reason if not Attempted Activity not applicable   Wheel 50 Feet with Two Turns CARE Score 9   Wheel 150 Feet   Reason if not Attempted Activity not applicable   Wheel 150 Feet CARE Score 9   Wheelchair mobility   Does the patient use a wheelchair? 0. No   Curb or Single Stair   Style negotiated Single stair   Type of Assistance Needed Supervision;Adaptive equipment   Comment [place] steps using R railing   1 Step (Curb) CARE Score 4   4 Steps   Type of Assistance Needed Supervision;Adaptive equipment   Comment [place] steps using R railing   4 Steps CARE Score 4   12 Steps   Type of Assistance Needed Supervision;Adaptive equipment   Comment [place] steps using R railing   12 Steps CARE Score 4   Stairs   Type Stairs   # of Steps 12   Weight Bearing Precautions Fall Risk   Assist Devices Single Rail   Findings reciprocal pattern   Toilet Transfer   Comment stood to urinate   Therapeutic Interventions   Flexibility B hamstring and gastroc stretching   Balance stepping over rolled bolster of different hts , Basketball , shooting from different angles, with use of orange disc with corresponding number, have pt. step on disc and then shoots ball   Equipment Use   NuStep LE only X 13 mins   Other Comments   Comments Uyen PIERCE   Assessment   Treatment Assessment Pt. able to participate in 90 mins session and tolerated session well. HE was able to get some short naps earlier and has some energy to endure above activities with rest breaks as needed. Noted swelling on B ankles with Angelina CRNP made aware. Pt. demonstrates same level of function at S with no AD amidst fatigue from chemo. He did not have any complaints during session and was assisted back to bed to rest at end of PT with 1:1 present.   Family/Caregiver Present Wife Was present prior to session and this therapist was able to talk to her and cont to recommend use of RW until home PT will recommend for pt. to start moving without  AD   Problem List Decreased strength;Decreased endurance;Impaired balance;Decreased mobility;Decreased cognition;Impaired judgement;Decreased safety awareness   Barriers to Discharge   (Medical complexity)   PT Barriers   Functional Limitation Car transfers;Stair negotiation;Standing;Transfers;Walking   Plan   Treatment/Interventions Functional transfer training;LE strengthening/ROM;Elevations;Therapeutic exercise;Endurance training;Patient/family training;Equipment eval/education;Bed mobility;Gait training   Discharge Recommendation   Rehab Resource Intensity Level, PT   (Home PT)   Equipment Recommended Walker   Discharge Summary d/c still pending medical clearance   PT Therapy Minutes   PT Time In 1230   PT Time Out 1400   PT Total Time (minutes) 90   PT Mode of treatment - Individual (minutes) 90   PT Mode of treatment - Concurrent (minutes) 0   PT Mode of treatment - Group (minutes) 0   PT Mode of treatment - Co-treat (minutes) 0   PT Mode of Treatment - Total time(minutes) 90 minutes   PT Cumulative Minutes 1041   Therapy Time missed   Time missed? No

## 2025-05-23 NOTE — PROGRESS NOTES
Progress Note - PMR   Name: Alexis Dennison 64 y.o. 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 Leucovorin rescue again today with monitoring of MTX level daily until it is less than 0.10 per Onc  - Onc to set-up outpatient Rituximab infusions for after discharge  - Daily methotrexate levels  - Hem/Onc following  - NSGY following  - SLP for cognition  Type 2 diabetes mellitus with hyperglycemia, without long-term current use of insulin (HCC)  Lab Results   Component Value Date    HGBA1C 6.6 (H) 02/22/2025       Recent Labs     05/22/25  1039 05/22/25  1606 05/22/25  2048 05/23/25  0657   POCGLU 226* 128 147* 122       Blood Sugar Average: Last 72 hrs:  (P) [id number]    - Metformin 1000mg daily restarted 5/22  - Lantus 10u daily with Lispro 8u TID and SSI  - Monitor accuchecks while on steroids  - Management per IM  HTN, goal below 130/80  > Blood pressure controlled off antihypertensives    - Monitor VS  -  Management per IM  Mixed hyperlipidemia  - Continue atorvastatin 10 mg daily  Thyroid nodule  Outpatient endocrine follow-up needed  Pulmonary nodule  > CTA 3/29: nonspecific 4 mm RLL pulmonary nodule   - follow up outpatient for repeat imaging in 3 months  Liver lesion  > CTA 3/29- nonspecific 12mm hypodense right hepatic lesion, recommended MRI abdomen  > MRI abdomen 3/30- simple right hepatic lobe cyst    - Follow up outpatient for monitoring  Ambulatory dysfunction  - Fall precautions  - PT/OT  Encephalopathy  > Patient has been pleasantly confused for months in the setting of brain mass.  > acutely worsened due to UTI and bacteremia. S/p 7 day course of antibx  > Continues to lack insight to his current situation and is severely cognitively impaired.  He does not get agitated for long periods of time and is mostly confused and impulsive.  > A&O 1 on [facility] admission    - Continue seroquel 12.5mg at noon with 50mg HS  - PRN Zyprexa 2.5mg IM as needed for agitation  -Overstimulation precautions, frequent re-orientation, re-direction, re-assurance  -Sleep log and agitation monitoring if needed  -Optimize sleep-wake cycle  -Limit sedating medications when possible  - Ensure optimal management electrolytes, nutrition, and hydration  - Ensure optimal bowel/bladder management  - Ensure optimal pain management   -Patient/family/caregiver education and training   -Continue 1:1 due to overall safety, impulsivity, lack of safety awareness and poor insight.  -Fall precautions with frequent rounding; proactive toileting program, patient should not be unattended in bathroom      LETY (acute kidney injury) (HCC)  > Baseline Cr 0.9  > Recently 1.3 improved from before- peak 2.7  > Now s/p bicarb    - IVF per IM/Nephro  - Management at discretion of nephro   E. coli UTI  > Pet met sepsis criteria on 4/29 with confusion and agitation. UA was positive. Urine culture and blood cultures showed Ecoli sensitive to cephalosporins.  > ID  consulted and pt completed 7d course of ceftriaxone  > Approved to restart chemo 5/2.  > Chemo on hold for GNR UTI and bacteremia > restarted chemo on 5/21    - Monitor while on antibiotics  Impaired mobility and activities of daily living  Patient was evaluated by the rehabilitation team MD and an appropriate candidate for acute inpatient rehabilitation program at this time.  The patient will tolerate 3 hours/day 5 to 7 days/week of intensive physical, occupational in order to obtain goals for community discharge  Due to the patient's functional Compared to their baseline level of function in addition to their ongoing medical needs, the patient would benefit from daily supervision from a rehabilitation physician as well as rehabilitation nursing to implement and adjust the medical as well as functional plan of care in order to meet the patient's goals.  HTN (hypertension)  Had been on losartan but holding at this time- his blood pressures are good  Transaminitis  CMP checked on 5/9 and AST is down to 57 from 111 and ALT to 281 from 322  5/19 labs: AST 11 and ALT 25.  5/23- AST 89 and  - monitor routinely while on MTX  Hyponatremia  Sodium stable at 140 on 5/19 labs  5/23- Na 139 and stalbe  Thrombocytopenia (HCC)  5/19- Platelets down to 79K- will need to continue monitoring with chemotherapy now on hold  5/23- Plts improved to 153K from 145K  Sepsis (HCC)  5/19- Sepsis alert over the weekend.  CT A/P showed perinephric stranding. UA abnormal and Ucx grew GNR in urine. Blood cultures also + for GNR. Lactate 4.4>5.1. procal 7.39> 6.55. Started on IV antibiotics and Chemo on hold per Onc  5/22- Cleared by ID to restart chemo. Transitioned to PO antibiotics  - Continue antibiotics  - Monitor WBC on routine labs  - Follow temp curve  Recurrent E. coli bacteremia  5/20- Antibx changed from cefepime to ceftriaxone per ID recommendations. Need to follow up final susceptibilities and adjust antibiotics  accordingly  Now on oral Duricef, no longer on IV medications  Leukopenia  5/19- WBC improving. Now 3.93 from 1.84 off chemo  5/23- WBC stable at 6.54  Anemia  5/19 - hemoglobin of 8.0 - improved from 6.7 after transfusion of 1u pRBC over the weekend  5/23- Hgb 9.9  -- improved from 8.9 yesterday  At risk for acid-base imbalance  Urine pH testing and sodium bicarb drip with MTX treatments > restarted now on bicarb 100 ml/hr  Electrolyte imbalance risk    SIRS (systemic inflammatory response syndrome) (HCC)      Subjective   64-year-old male with history of hyperlipidemia hypertension, GERD, type 2 diabetes, NAFLD and sleep apnea presenting on 3/29 for increased confusion. Recently diagnosed with brain mass and had worsening of symptoms and had to come to the hospital and was found to have findings consistent with CNS lymphoma and is under treatment with methotrexate and Rituxan as well as steroids. Steroid course has overall completed and his course was complicated by LETY, delirium and agitation as well as an E. coli UTI with bacteremia status post treatment antibiotics     Chief Complaint: f/u ambulatory dysfunction    Interval: Patient seen and examined. No acute overnight events. Wife at bedside this morning. Discussed with patient and his wife that discharge plan is pending MTX levels and per Oncology's plan. Otherwise, denies any fevers, chills, chest pain, sob, abdominal pain, nausea/vomiting, lightheadedness or dizziness. Eating 100% of meals. Last BM 5/21 and continent. Labs reviewed today and notable for: CO2 34, AST 89, , total protein 5.9, Hgb 9.9 and MTX level 5.10    Objective :  Temp:  [97.9 °F (36.6 °C)-98.1 °F (36.7 °C)] 98 °F (36.7 °C)  HR:  [67-72] 70  BP: (117-126)/(63-73) 126/73  Resp:  [16-18] 18  SpO2:  [96 %-98 %] 96 %  O2 Device: None (Room air)    Functional Update:  Mobility: supervision bed mobility, supervision without AD for ambulation 150', supervision stairs x12  Transfers:  supervision without AD  ADLs: setup eating, sup oral care/UBD/footwear/toileting, CGA bathing/LBD    Physical Exam  Vitals reviewed.   Constitutional:       Appearance: Normal appearance.   HENT:      Head: Normocephalic and atraumatic.      Right Ear: External ear normal.      Left Ear: External ear normal.      Nose: Nose normal.      Mouth/Throat:      Mouth: Mucous membranes are moist.     Eyes:      Conjunctiva/sclera: Conjunctivae normal.       Cardiovascular:      Rate and Rhythm: Normal rate and regular rhythm.      Pulses: Normal pulses.      Heart sounds: Normal heart sounds.   Pulmonary:      Effort: Pulmonary effort is normal. No respiratory distress.      Breath sounds: Normal breath sounds. No wheezing, rhonchi or rales.   Abdominal:      General: There is no distension.      Palpations: Abdomen is soft.      Comments: +hypoactive bowel sounds     Musculoskeletal:      Cervical back: Normal range of motion.      Right lower leg: No edema.      Left lower leg: No edema.     Skin:     General: Skin is warm and dry.     Neurological:      Mental Status: He is alert. Mental status is at baseline.     Psychiatric:         Mood and Affect: Mood normal.         Behavior: Behavior normal.           Scheduled Meds:  Current Facility-Administered Medications   Medication Dose Route Frequency Provider Last Rate    acetaminophen  650 mg Oral Q6H PRN Crispin Arana MD      allopurinol  300 mg Oral Daily Crispin Arana MD      alteplase  2 mg Intracatheter Q1MIN PRN Crispin Arana MD      alteplase  2 mg Intracatheter Q1MIN PRN Magali Lee MD      alteplase  2 mg Intracatheter Q1MIN PRN Satnam Kebede MD      alteplase  2 mg Intracatheter Q1MIN PRN Satnam Kebede MD      aluminum-magnesium hydroxide-simethicone  30 mL Oral Q4H PRN Crispin Arana MD      atorvastatin  20 mg Oral Daily Crispin Arana MD      bisacodyl  10 mg Rectal Daily PRN Skyler Hendrickson MD      calcium carbonate  1,000 mg Oral Daily PRN Crispin  MD Yasmeen      cefadroxil  1,000 mg Oral Q12H [facility] Lisa Singh MD      chlorhexidine  15 mL Mouth/Throat Q12H [facility] Crispin Arana MD      docusate sodium  100 mg Oral BID Jessica Arreola,       heparin (porcine)  5,000 Units Subcutaneous Q8H [facility] Crispin Arana MD      insulin lispro  1-5 Units Subcutaneous TID AC PATI Porras      insulin lispro  1-5 Units Subcutaneous HS PATI Porras      lactulose  20 g Oral Daily PRN Joshua Kaminski DO      leucovorin 25 mg in dextrose 5 % 50 mL IVPB  25 mg Intravenous Q6H Nicki Renteria MD Stopped (05/23/25 0610)    melatonin  6 mg Oral HS Crispin Arana MD      metFORMIN  1,000 mg Oral Daily PATI Porras      multi-electrolyte  75 mL/hr Intravenous Continuous Johnny Naik MD      OLANZapine  5 mg Intramuscular BID PRN Crispin Arana MD      ondansetron  4 mg Intravenous Q6H PRN Crispin Arana MD      oxyCODONE  2.5 mg Oral Q4H PRN Crispin Arana MD      Or    oxyCODONE  5 mg Oral Q4H PRN Crispin Arana MD      pantoprazole  40 mg Oral Early Morning Crispin Arana MD      polyethylene glycol  17 g Oral Daily PRN Skyler Hendrickson MD      QUEtiapine  12.5 mg Oral Daily Crispin Arana MD      QUEtiapine  50 mg Oral HS Crispin Araan MD      senna  2 tablet Oral Daily With Lunch Jessica Arreola DO      sitaGLIPtin  50 mg Oral Daily PATI Porras      sodium chloride  20 mL/hr Intravenous Once PRN Satnam Kebede MD      sodium chloride  20 mL/hr Intravenous Once PRN Satnam Kebede MD           Lab Results: I have reviewed the following results:  Results from last 7 days   Lab Units 05/22/25  0613 05/21/25  1033 05/19/25  0559   HEMOGLOBIN g/dL 8.9* 10.3* 8.0*   HEMATOCRIT % 25.7* 32.0* 23.7*   WBC Thousand/uL 9.85 9.02 3.93*   PLATELETS Thousands/uL 145* 144* 79*     Results from last 7 days   Lab Units 05/23/25  0529 05/22/25  0613 05/21/25  1033   BUN mg/dL 20 24 19   SODIUM mmol/L 142 138 142   POTASSIUM mmol/L 3.8 4.0  3.8   CHLORIDE mmol/L 99 100 105   CREATININE mg/dL 1.24 1.26 1.28   AST U/L 100* 16 24   ALT U/L 150* 32 40

## 2025-05-23 NOTE — ASSESSMENT & PLAN NOTE
CMP checked on 5/9 and AST is down to 57 from 111 and ALT to 281 from 322  5/19 labs: AST 11 and ALT 25.  5/23- AST 89 and  - monitor routinely while on MTX

## 2025-05-23 NOTE — ASSESSMENT & PLAN NOTE
Baseline creatinine 0.8 to 1.1.   Etiology suspected to be due to prerenal azotemia/possible early ATN  Peak SCr 2.77 on 4/22/25.   Renal function improved and currently stable at creatinine 1.24 mg/dL despite chemotherapy with methotrexate on 5/21.  Patient has been receiving bicarb drip due to chemotherapy with methotrexate but bicarb level now has trended up to 36 and urine pH is elevated to 8.5.  Methotrexate level is on the higher side at 5.1.  Changed IV fluid to Isoly[place] but will continue at lower rate of 75 mL/h as she has developed some lower extremity edema.  Weight has been stable, continue to monitor.  Avoid nephrotoxins

## 2025-05-23 NOTE — PROGRESS NOTES
Progress Note - Infectious Disease   Name: Alexis Dennison 64 y.o. male I MRN: [id number]  Unit/Bed#: -01 I Date of Admission: 5/7/2025   Date of Service: 5/23/2025 I Hospital Day: 16    Assessment & Plan  Sepsis (HCC)  HR, leukopenia.  Due to bacteremia as below.  No other appreciable source.  LFTs, CT A/P otherwise unremarkable.  Improved..    Continue antibiotics as below  Follow temperatures closely  Supportive care as per the primary service  Recurrent E. coli bacteremia  Initial positive blood culture on 4/29 due to UTI versus PICC, status post 7 days of IV antibiotic thru 5/5.  Now with recurrence due to UTI with positive urine culture, increased perinephric stranding seen on CT.  Consider PICC infection, less likely.    Continue Duricef through 5/30 for 14 days total antibiotics  OK to keep PICC  Primary central nervous system (CNS) lymphoma  Diagnosed by LP, brain biopsy.  Complicated by obstructive hydrocephalus, status post EVD 4/13 through 4/19.  Status post Rituxan, high-dose methotrexate and intrathecal cytarabine on 4/17.  Received repeat dose of Rituxan on 5/10, MTX 5/21.  Now off decadron.    Type 2 diabetes mellitus with hyperglycemia, without long-term current use of insulin ([facility])  Lab Results   Component Value Date    HGBA1C 6.6 (H) 02/22/2025   Relatively well-controlled, though remains a risk factor for infection.      The patient is stable from an ID standpoint  We will sign off for now.  Please call with new questions.    Antibiotics:  Duricef #4  Antibiotics #7    Subjective   Patient has no fever, chills, sweats; no nausea, vomiting, diarrhea; no cough, shortness of breath; no pain. No new symptoms.    Objective :  Temp:  [97.9 °F (36.6 °C)-98.1 °F (36.7 °C)] 98 °F (36.7 °C)  HR:  [67-72] 70  BP: (117-126)/(63-73) 126/73  Resp:  [16-18] 18  SpO2:  [96 %-98 %] 96 %  O2 Device: None (Room air)    General:  No acute distress  Psychiatric:  Awake and alert  Pulmonary:  Normal respiratory  excursion without accessory muscle use  Abdomen:  Soft, nontender  Extremities:  No edema  Skin:  No rashes      Lab Results: I have reviewed the following results:  Results from last 7 days   Lab Units 05/22/25  0613 05/21/25  1033 05/19/25  0559   WBC Thousand/uL 9.85 9.02 3.93*   HEMOGLOBIN g/dL 8.9* 10.3* 8.0*   PLATELETS Thousands/uL 145* 144* 79*     Results from last 7 days   Lab Units 05/23/25  0529 05/22/25  0613 05/21/25  1033   SODIUM mmol/L 142 138 142   POTASSIUM mmol/L 3.8 4.0 3.8   CHLORIDE mmol/L 99 100 105   CO2 mmol/L 36* 33* 27   BUN mg/dL 20 24 19   CREATININE mg/dL 1.24 1.26 1.28   EGFR ml/min/1.73sq m 60 59 58   CALCIUM mg/dL 8.5 8.4 9.1   AST U/L 100* 16 24   ALT U/L 150* 32 40   ALK PHOS U/L 63 58 70   ALBUMIN g/dL 3.2* 3.2* 3.5     Results from last 7 days   Lab Units 05/17/25  1410 05/17/25  1217   BLOOD CULTURE   --  Escherichia coli*  Escherichia coli*   GRAM STAIN RESULT   --  Gram negative rods*  Gram negative rods*   URINE CULTURE  >100,000 cfu/ml Escherichia coli*  --      Results from last 7 days   Lab Units 05/18/25  0543 05/17/25  1217   PROCALCITONIN ng/ml 6.55* 7.39*

## 2025-05-23 NOTE — ASSESSMENT & PLAN NOTE
Patient completed 7 days of IV cefazolin which was finished on May 5  CT of A/P was done 5/18 per recommendation from ID looking for possible reason for recurrent bacteremia = was unrevealing  Was on Cetriaxone --> ID changed to Duricef to continue through 5/30/25 for a 14 days total of antibiotic tx

## 2025-05-23 NOTE — PROGRESS NOTES
"   05/23/25 4725   Pain Assessment   Pain Assessment Tool 0-10   Pain Score No Pain   Restrictions/Precautions   Precautions 1:1;Bed/chair alarms;Cognitive;Fall Risk;Limb alert;Multiple lines;Supervision on toilet/commode  (chemo precautions)   Weight Bearing Restrictions No   ROM Restrictions No   Comprehension   Comprehension (FIM) 4 - Understands basic info/conversation 75-90% of time   Expression   Expression (FIM) 4 - Expresses basic info/needs 75-90% of time   Social Interaction   Social Interaction (FIM) 5 - Interacts appropriately with others 90% of time   Problem Solving   Problem solving (FIM) 2 - Solves basic problems 25-49% of time   Memory   Memory (FIM) 2 - Recognizes, recalls/performs 25-49%   Speech/Language/Cognition Assessmetn   Treatment Assessment Pt seen for skilled speech therapy session targeting cognitive linguistic communication skills. Pt alert and interactive- completed the following skilled therapy tasks. Orientation- unable to recall any parts of place, situation or date. Pt did guess \"April\" and stated \"2004\" for year. Pt cued to use visual signs in room but needed max cues to find and did not recall where to look. Pt then completed functional problem solving with predicating situations from results. Pt was given a statement to then determine what would cause someone to say taht. Pt overall able to provide appropriate answers with min to mod A. Noted at times with more vague explanations, benefitting from verbal cues to elaborate more and improve thought organization. Last, engaged in word sorting task with abstract categories- able to complete with 18/20acc increasing with verbal cues for attention and to reduce perseveration. Given strategy to cross off words as he progressed to increase attention.  Pt overall is improving with skilled SLP services and will cont to benefit to maximize overall cognitive linguistic communication abilities at this time.   SLP Therapy Minutes   SLP Time " In 0930   SLP Time Out 1015   SLP Total Time (minutes) 45   SLP Mode of treatment - Individual (minutes) 45   SLP Mode of treatment - Concurrent (minutes) 0   SLP Mode of treatment - Group (minutes) 0   SLP Mode of treatment - Co-treat (minutes) 0   SLP Mode of Treatment - Total time(minutes) 45 minutes   SLP Cumulative Minutes 615   Therapy Time missed   Time missed? No

## 2025-05-23 NOTE — ASSESSMENT & PLAN NOTE
Initial positive blood culture on 4/29 due to UTI versus PICC, status post 7 days of IV antibiotic thru 5/5.  Now with recurrence due to UTI with positive urine culture, increased perinephric stranding seen on CT.  Consider PICC infection, less likely.    Continue Duricef through 5/30 for 14 days total antibiotics  OK to keep PICC

## 2025-05-23 NOTE — ASSESSMENT & PLAN NOTE
HR, leukopenia.  Due to bacteremia as below.  No other appreciable source.  LFTs, CT A/P otherwise unremarkable.  Improved..    Continue antibiotics as below  Follow temperatures closely  Supportive care as per the primary service

## 2025-05-23 NOTE — ASSESSMENT & PLAN NOTE
Monitor status post methotrexate  Peak creat was 2.77 on 4/22/25  Previous baseline as OP 5/2023-2/22/25 was 1.1 to 1.0  CMP 5/9/25 with creatinine of 1.18 down from 1.23 on 5/8 and on 5/10 was 1.15  CMP on 5/12/25 showed creatinine stable at 1.1  5/17: creat up again to 1.38 = Being followed by Nephrology. Met sepsis criteria 5/17 and placed on IVF ordered as per sepsis protocol  5/19: creat trending down and was 1.20 -->  IVF was  reduced to 50ml/hr per renal.  On 5/20/25-5/21/25, they want to continue same IVF  Creatinine today 5/23 is 1.24  For CMP 5/24/25

## 2025-05-23 NOTE — PROGRESS NOTES
OT Daily Treatment Note     05/23/25 0700   Pain Assessment   Pain Assessment Tool 0-10   Pain Score No Pain   Restrictions/Precautions   Precautions 1:1;Bed/chair alarms;Cognitive;Fall Risk;Limb alert;Multiple lines;Other (comment);Supervision on toilet/commode  (Chemo precautions)   Weight Bearing Restrictions No   ROM Restrictions No   Lifestyle   Autonomy Pt agreeable to participate in skilled OT session today.   Eating   Type of Assistance Needed Independent   Physical Assistance Level No physical assistance   Comment Pt able to eat breakfast independently while seated.   Eating CARE Score 6   Oral Hygiene   Type of Assistance Needed Supervision   Physical Assistance Level No physical assistance   Comment In stance at sink with no need for VC.   Oral Hygiene CARE Score 4   Shower/Bathe Self   Type of Assistance Needed Supervision   Physical Assistance Level No physical assistance   Comment Seated EOB. Pt able to wash 9/10 body parts with supervision. Able to stand to wash chavo area and rear. Today, stood quickly with slight LOB but able to remain upright.   Shower/Bathe Self CARE Score 4   Upper Body Dressing   Type of Assistance Needed Supervision   Physical Assistance Level No physical assistance   Comment Seated EOB. Pt able to don shirt without assistance.   Upper Body Dressing CARE Score 4   Lower Body Dressing   Type of Assistance Needed Supervision   Physical Assistance Level No physical assistance   Comment Pt able to don briefs and pants over the legs seated EOB. Able to stand without LOB to don briefs and pants over hips. Pt required VC for underwear orientation. No VC needed for pants.   Lower Body Dressing CARE Score 4   Putting On/Taking Off Footwear   Type of Assistance Needed Supervision   Physical Assistance Level No physical assistance   Comment Seated EOB. Pt able to doff and don socks.   Putting On/Taking Off Footwear CARE Score 4   Lying to Sitting on Side of Bed   Type of Assistance Needed  Supervision   Physical Assistance Level No physical assistance   Comment Pt able to sit up from supine without use of bedrails.   Lying to Sitting on Side of Bed CARE Score 4   Sit to Stand   Type of Assistance Needed Supervision   Physical Assistance Level No physical assistance   Comment No AD.   Sit to Stand CARE Score 4   Bed-Chair Transfer   Type of Assistance Needed Supervision   Physical Assistance Level No physical assistance   Comment No AD. No LOB noted.   Chair/Bed-to-Chair Transfer CARE Score 4   Toileting Hygiene   Type of Assistance Needed Supervision   Physical Assistance Level No physical assistance   Comment In stance. Able to manage clothing independently without LOB.   Toileting Hygiene CARE Score 4   Toilet Transfer   Type of Assistance Needed Supervision   Physical Assistance Level No physical assistance   Comment In stance with no noted LOB.   Toilet Transfer CARE Score 4   Exercise Tools   Exercise Tools Yes   Theraband Pt engaged in UB strengthening using a theraband. Completed one bilateral and two unilateral exercises. Pt completed horizontal abduction, shoulder flexion, and D2 PNF pattern. Each exercise performed with 20 repetitions. Pt provided with visual and VC for each exercise, but required additional cueing for proper form. Pt able to count repetitions without assistance.   Cognition   Overall Cognitive Status Impaired   Arousal/Participation Alert;Cooperative   Attention Attends with cues to redirect   Orientation Level Oriented to person;Disoriented to place;Disoriented to time;Disoriented to situation   Memory Decreased long term memory;Decreased recall of biographical information;Decreased short term memory;Decreased recall of recent events;Decreased recall of precautions   Following Commands Follows one step commands without difficulty   Activity Tolerance   Activity Tolerance Patient limited by fatigue   Assessment   Treatment Assessment Pt engaged in 60-minute skilled OT  session focused on ADL participation, UB strength, UB ROM, and endurance. Pt able to complete ADLs with supervision. Required additional VC for orientation of briefs during LB dressing. Noted LOB one time during sit to stand due to quick standing. Pt provided education theraband exercises to address UB strength, UB ROM, and endurance. Pt completed 20 repetitions of 3 exercises: horizontal abduction, shoulder flexion, and D2 PNF pattern. Pt had no complaints of pain or fatigue due to the exercises. Required additional cues for proper form and exercise instructions. Pt anticipated to d/c tomorrow.   Barriers to Discharge None   OT Therapy Minutes   OT Time In 0700   OT Time Out 0800   OT Total Time (minutes) 60   OT Mode of treatment - Individual (minutes) 60   OT Mode of treatment - Concurrent (minutes) 0   OT Mode of treatment - Group (minutes) 0   OT Mode of treatment - Co-treat (minutes) 0   OT Mode of Treatment - Total time(minutes) 60 minutes   OT Cumulative Minutes 1015   Therapy Time missed   Time missed? No

## 2025-05-23 NOTE — ASSESSMENT & PLAN NOTE
Hematology is following.     Complicated by obstructive hydrocephalus, status post EVD on 4/13 through 4/19.    Status post rituximab, status post high-dose methotrexate and intrathecal cytarabine on 4/17.  Repeat dose of rituximab on 5/10  Methotrexate was on hold earlier in the week due to suspected sepsis with blood culture positive for gram-negative katherine and urine culture positive for gram-negative katherine.  Seen by ID, status post IV antibiotics and now switched to Duricef on 5/20, was cleared for chemotherapy.    Patient received IV methotrexate on 5/21.  Continue to monitor renal function, continue IV fluid as mentioned above   3

## 2025-05-24 LAB
ALBUMIN SERPL BCG-MCNC: 3.2 G/DL (ref 3.5–5)
ALP SERPL-CCNC: 54 U/L (ref 34–104)
ALT SERPL W P-5'-P-CCNC: 123 U/L (ref 7–52)
ANION GAP SERPL CALCULATED.3IONS-SCNC: 6 MMOL/L (ref 4–13)
AST SERPL W P-5'-P-CCNC: 47 U/L (ref 13–39)
BASOPHILS # BLD AUTO: 0.01 THOUSANDS/ÂΜL (ref 0–0.1)
BASOPHILS NFR BLD AUTO: 0 % (ref 0–1)
BILIRUB SERPL-MCNC: 0.57 MG/DL (ref 0.2–1)
BUN SERPL-MCNC: 19 MG/DL (ref 5–25)
CALCIUM ALBUM COR SERPL-MCNC: 9.5 MG/DL (ref 8.3–10.1)
CALCIUM SERPL-MCNC: 8.9 MG/DL (ref 8.4–10.2)
CHLORIDE SERPL-SCNC: 103 MMOL/L (ref 96–108)
CO2 SERPL-SCNC: 30 MMOL/L (ref 21–32)
CREAT SERPL-MCNC: 0.97 MG/DL (ref 0.6–1.3)
EOSINOPHIL # BLD AUTO: 0.02 THOUSAND/ÂΜL (ref 0–0.61)
EOSINOPHIL NFR BLD AUTO: 0 % (ref 0–6)
ERYTHROCYTE [DISTWIDTH] IN BLOOD BY AUTOMATED COUNT: 15.1 % (ref 11.6–15.1)
GFR SERPL CREATININE-BSD FRML MDRD: 81 ML/MIN/1.73SQ M
GLUCOSE SERPL-MCNC: 105 MG/DL (ref 65–140)
GLUCOSE SERPL-MCNC: 107 MG/DL (ref 65–140)
GLUCOSE SERPL-MCNC: 131 MG/DL (ref 65–140)
GLUCOSE SERPL-MCNC: 138 MG/DL (ref 65–140)
GLUCOSE SERPL-MCNC: 94 MG/DL (ref 65–140)
HCT VFR BLD AUTO: 25.8 % (ref 36.5–49.3)
HGB BLD-MCNC: 8.7 G/DL (ref 12–17)
IMM GRANULOCYTES # BLD AUTO: 0.08 THOUSAND/UL (ref 0–0.2)
IMM GRANULOCYTES NFR BLD AUTO: 2 % (ref 0–2)
LYMPHOCYTES # BLD AUTO: 1.47 THOUSANDS/ÂΜL (ref 0.6–4.47)
LYMPHOCYTES NFR BLD AUTO: 31 % (ref 14–44)
MCH RBC QN AUTO: 31 PG (ref 26.8–34.3)
MCHC RBC AUTO-ENTMCNC: 33.7 G/DL (ref 31.4–37.4)
MCV RBC AUTO: 92 FL (ref 82–98)
MONOCYTES # BLD AUTO: 0.18 THOUSAND/ÂΜL (ref 0.17–1.22)
MONOCYTES NFR BLD AUTO: 4 % (ref 4–12)
MTX SERPL-SCNC: 0.4 UMOL/L
NEUTROPHILS # BLD AUTO: 2.92 THOUSANDS/ÂΜL (ref 1.85–7.62)
NEUTS SEG NFR BLD AUTO: 63 % (ref 43–75)
NRBC BLD AUTO-RTO: 0 /100 WBCS
PLATELET # BLD AUTO: 148 THOUSANDS/UL (ref 149–390)
PMV BLD AUTO: 10.2 FL (ref 8.9–12.7)
POTASSIUM SERPL-SCNC: 4.1 MMOL/L (ref 3.5–5.3)
PROT SERPL-MCNC: 5.4 G/DL (ref 6.4–8.4)
RBC # BLD AUTO: 2.81 MILLION/UL (ref 3.88–5.62)
SODIUM SERPL-SCNC: 139 MMOL/L (ref 135–147)
WBC # BLD AUTO: 4.68 THOUSAND/UL (ref 4.31–10.16)

## 2025-05-24 PROCEDURE — 99232 SBSQ HOSP IP/OBS MODERATE 35: CPT | Performed by: STUDENT IN AN ORGANIZED HEALTH CARE EDUCATION/TRAINING PROGRAM

## 2025-05-24 PROCEDURE — 80053 COMPREHEN METABOLIC PANEL: CPT | Performed by: NURSE PRACTITIONER

## 2025-05-24 PROCEDURE — 82948 REAGENT STRIP/BLOOD GLUCOSE: CPT

## 2025-05-24 PROCEDURE — 99232 SBSQ HOSP IP/OBS MODERATE 35: CPT | Performed by: INTERNAL MEDICINE

## 2025-05-24 PROCEDURE — 85025 COMPLETE CBC W/AUTO DIFF WBC: CPT | Performed by: NURSE PRACTITIONER

## 2025-05-24 PROCEDURE — 99232 SBSQ HOSP IP/OBS MODERATE 35: CPT | Performed by: PHYSICIAN ASSISTANT

## 2025-05-24 PROCEDURE — 80204 DRUG ASSAY METHOTREXATE: CPT

## 2025-05-24 RX ORDER — SODIUM CHLORIDE, SODIUM GLUCONATE, SODIUM ACETATE, POTASSIUM CHLORIDE, MAGNESIUM CHLORIDE, SODIUM PHOSPHATE, DIBASIC, AND POTASSIUM PHOSPHATE .53; .5; .37; .037; .03; .012; .00082 G/100ML; G/100ML; G/100ML; G/100ML; G/100ML; G/100ML; G/100ML
75 INJECTION, SOLUTION INTRAVENOUS CONTINUOUS
Status: CANCELLED | OUTPATIENT
Start: 2025-05-24

## 2025-05-24 RX ORDER — SODIUM CHLORIDE 9 MG/ML
75 INJECTION, SOLUTION INTRAVENOUS CONTINUOUS
Status: DISCONTINUED | OUTPATIENT
Start: 2025-05-24 | End: 2025-05-27

## 2025-05-24 RX ADMIN — Medication 6 MG: at 20:58

## 2025-05-24 RX ADMIN — METFORMIN ER 500 MG 1000 MG: 500 TABLET ORAL at 10:03

## 2025-05-24 RX ADMIN — DOCUSATE SODIUM 100 MG: 100 CAPSULE, LIQUID FILLED ORAL at 10:00

## 2025-05-24 RX ADMIN — ALLOPURINOL 300 MG: 300 TABLET ORAL at 10:00

## 2025-05-24 RX ADMIN — QUETIAPINE 50 MG: 25 TABLET ORAL at 21:00

## 2025-05-24 RX ADMIN — HEPARIN SODIUM 5000 UNITS: 5000 INJECTION INTRAVENOUS; SUBCUTANEOUS at 05:28

## 2025-05-24 RX ADMIN — DOCUSATE SODIUM 100 MG: 100 CAPSULE, LIQUID FILLED ORAL at 17:31

## 2025-05-24 RX ADMIN — SITAGLIPTIN 50 MG: 50 TABLET, FILM COATED ORAL at 10:00

## 2025-05-24 RX ADMIN — CEFADROXIL 1000 MG: 500 CAPSULE ORAL at 20:56

## 2025-05-24 RX ADMIN — SENNOSIDES 17.2 MG: 8.6 TABLET, FILM COATED ORAL at 12:22

## 2025-05-24 RX ADMIN — LEUCOVORIN CALCIUM 25 MG: 25 TABLET ORAL at 10:03

## 2025-05-24 RX ADMIN — HEPARIN SODIUM 5000 UNITS: 5000 INJECTION INTRAVENOUS; SUBCUTANEOUS at 21:01

## 2025-05-24 RX ADMIN — CEFADROXIL 1000 MG: 500 CAPSULE ORAL at 10:03

## 2025-05-24 RX ADMIN — SODIUM CHLORIDE, SODIUM GLUCONATE, SODIUM ACETATE, POTASSIUM CHLORIDE, MAGNESIUM CHLORIDE, SODIUM PHOSPHATE, DIBASIC, AND POTASSIUM PHOSPHATE 75 ML/HR: .53; .5; .37; .037; .03; .012; .00082 INJECTION, SOLUTION INTRAVENOUS at 01:07

## 2025-05-24 RX ADMIN — LEUCOVORIN CALCIUM 25 MG: 50 INJECTION, POWDER, LYOPHILIZED, FOR SOLUTION INTRAMUSCULAR; INTRAVENOUS at 01:47

## 2025-05-24 RX ADMIN — LEUCOVORIN CALCIUM 25 MG: 25 TABLET ORAL at 17:31

## 2025-05-24 RX ADMIN — QUETIAPINE 12.5 MG: 25 TABLET ORAL at 12:22

## 2025-05-24 RX ADMIN — SODIUM CHLORIDE 50 ML/HR: 0.9 INJECTION, SOLUTION INTRAVENOUS at 15:28

## 2025-05-24 RX ADMIN — LEUCOVORIN CALCIUM 25 MG: 25 TABLET ORAL at 12:22

## 2025-05-24 RX ADMIN — ATORVASTATIN CALCIUM 20 MG: 20 TABLET, FILM COATED ORAL at 10:00

## 2025-05-24 RX ADMIN — CHLORHEXIDINE GLUCONATE 15 ML: 1.2 SOLUTION ORAL at 20:55

## 2025-05-24 RX ADMIN — HEPARIN SODIUM 5000 UNITS: 5000 INJECTION INTRAVENOUS; SUBCUTANEOUS at 14:21

## 2025-05-24 RX ADMIN — LEUCOVORIN CALCIUM 25 MG: 25 TABLET ORAL at 23:24

## 2025-05-24 RX ADMIN — PANTOPRAZOLE SODIUM 40 MG: 40 TABLET, DELAYED RELEASE ORAL at 05:28

## 2025-05-24 NOTE — PROGRESS NOTES
Progress Note - Nephrology   Name: Alexis Dennison 64 y.o. male I MRN: [id number]  Unit/Bed#: -01 I Date of Admission: 5/7/2025   Date of Service: 5/24/2025 I Hospital Day: 17    Assessment & Plan  LETY (acute kidney injury) (HCC)  Baseline creatinine 0.8 to 1.1.   Etiology suspected to be due to prerenal azotemia/possible early ATN  Peak SCr 2.77 on 4/22/25.   Renal function now at baseline  Will hold further iv fluids    HTN (hypertension)  BP stable and is at goal  Not on BP meds.  Avoid hypotension    Primary central nervous system (CNS) lymphoma  Hematology is following.     Complicated by obstructive hydrocephalus, status post EVD on 4/13 through 4/19.    Status post rituximab, status post high-dose methotrexate and intrathecal cytarabine on 4/17.  Repeat dose of rituximab on 5/10  Methotrexate was on hold earlier in the week due to suspected sepsis with blood culture positive for gram-negative katherine and urine culture positive for gram-negative katherine.  Seen by ID, status post IV antibiotics and now switched to Duricef on 5/20, was cleared for chemotherapy.    Patient received IV methotrexate on 5/21.  Continue to monitor renal function, continue IV fluid for another 24 hours then stop  Sepsis (HCC)  Blood culture urine culture positive for gram-negative rods-E. coli.  Urine culture was positive for E. coli was found to be tachycardic and hypotensive and had leukopenia.  Continue   antibiotic per infectious disease  Anemia  Status post PRBC, hemoglobin trended down to 8.9 g/dL today, continue to monitor per primary team  Ankle edema, bilateral  Overall stable, will monitor off fluids    I have reviewed the nephrology recommendations including fluids management with SLIM, with and we are in agreement with renal plan including the information outlined above. Will see as needed given normalization of renal function.     Subjective     Patient was seen today  No acute cp, sob, fevers  1:1 in room    Objective  :  Temp:  [97.5 °F (36.4 °C)-98.6 °F (37 °C)] 97.5 °F (36.4 °C)  HR:  [71-83] 71  BP: (116-133)/(57-76) 133/72  Resp:  [17-18] 18  SpO2:  [95 %-97 %] 96 %  O2 Device: None (Room air)    Current Weight: Weight - Scale: 79.4 kg (175 lb 0.7 oz)  First Weight: Weight - Scale: 75.9 kg (167 lb 6.4 oz)  I/O         05/22 0701  05/23 0700 05/23 0701  05/24 0700 05/24 0701 05/25 0700    P.O. 610 600     Total Intake(mL/kg) 610 (7.7) 600 (7.6)     Urine (mL/kg/hr) 475 (0.2)      Total Output 475      Net +135 +600            Unmeasured Urine Occurrence 1 x 7 x           Physical Exam  Vitals and nursing note reviewed.   Constitutional:       General: He is not in acute distress.     Appearance: He is well-developed.   HENT:      Head: Normocephalic and atraumatic.     Eyes:      Conjunctiva/sclera: Conjunctivae normal.       Cardiovascular:      Rate and Rhythm: Normal rate and regular rhythm.      Heart sounds: No murmur heard.  Pulmonary:      Effort: Pulmonary effort is normal. No respiratory distress.      Breath sounds: Normal breath sounds.   Abdominal:      Palpations: Abdomen is soft.      Tenderness: There is no abdominal tenderness.     Musculoskeletal:         General: No swelling.      Cervical back: Neck supple.     Skin:     General: Skin is warm and dry.      Capillary Refill: Capillary refill takes less than 2 seconds.     Neurological:      Mental Status: He is alert.     Psychiatric:         Mood and Affect: Mood normal.       Medications:  Current Medications[1]      Lab Results: I have reviewed the following results:  Results from last 7 days   Lab Units 05/24/25  0538 05/23/25  1113 05/23/25  0529 05/22/25  0613 05/21/25  1033 05/20/25  0541 05/19/25  0559 05/18/25  0856 05/18/25  0543   WBC Thousand/uL 4.68 6.54  --  9.85 9.02  --  3.93* 1.84* 1.91*   HEMOGLOBIN g/dL 8.7* 9.9*  --  8.9* 10.3*  --  8.0* 6.7* 6.3*   HEMATOCRIT % 25.8* 30.1*  --  25.7* 32.0*  --  23.7* 20.1* 18.9*   PLATELETS Thousands/uL  "148* 153  --  145* 144*  --  79* 66* 68*   POTASSIUM mmol/L 4.1 3.7 3.8 4.0 3.8 3.5 3.9  --  3.5   CHLORIDE mmol/L 103 98 99 100 105 106 107  --  105   CO2 mmol/L 30 34* 36* 33* 27 29 27  --  30   BUN mg/dL 19 19 20 24 19 19 19  --  20   CREATININE mg/dL 0.97 1.23 1.24 1.26 1.28 1.20 1.20  --  1.28   CALCIUM mg/dL 8.9 8.8 8.5 8.4 9.1 8.3* 7.9*  --  7.4*   ALBUMIN g/dL 3.2* 3.5 3.2* 3.2* 3.5  --  2.8*  --  2.6*       Administrative Statements     Portions of the record may have been created with voice recognition software. Occasional wrong word or \"sound a like\" substitutions may have occurred due to the inherent limitations of voice recognition software. Read the chart carefully and recognize, using context, where substitutions have occurred.If you have any questions, please contact the dictating provider.       [1]   Current Facility-Administered Medications:     acetaminophen (TYLENOL) tablet 650 mg, 650 mg, Oral, Q6H PRN, Crispin Arana MD, 650 mg at 05/16/25 0844    allopurinol (ZYLOPRIM) tablet 300 mg, 300 mg, Oral, Daily, Crispin Arana MD, 300 mg at 05/23/25 0845    alteplase (CATHFLO) injection 2 mg, 2 mg, Intracatheter, Q1MIN PRN, Crispin Arana MD    alteplase (CATHFLO) injection 2 mg, 2 mg, Intracatheter, Q1MIN PRN, Magali Lee MD    alteplase (CATHFLO) injection 2 mg, 2 mg, Intracatheter, Q1MIN PRN, Satnam Kebede MD    alteplase (CATHFLO) injection 2 mg, 2 mg, Intracatheter, Q1MIN PRN, Satnam Kebede MD    alteplase (CATHFLO) injection 2 mg, 2 mg, Intracatheter, Q1MIN PRN, Satnam Kebede MD    aluminum-magnesium hydroxide-simethicone (MAALOX) oral suspension 30 mL, 30 mL, Oral, Q4H PRN, Crispin Arana MD    atorvastatin (LIPITOR) tablet 20 mg, 20 mg, Oral, Daily, Crispin Arana MD, 20 mg at 05/23/25 0845    bisacodyl (DULCOLAX) rectal suppository 10 mg, 10 mg, Rectal, Daily PRN, Skyler Hendrickson MD    calcium carbonate (TUMS) chewable tablet 1,000 mg, 1,000 mg, Oral, Daily PRN, Crispin Arana MD    " cefadroxil (DURICEF) capsule 1,000 mg, 1,000 mg, Oral, Q12H [facility], Lisa Singh MD, 1,000 mg at 05/23/25 2129    chlorhexidine (PERIDEX) 0.12 % oral rinse 15 mL, 15 mL, Mouth/Throat, Q12H [facility], Crispin Arana MD, 15 mL at 05/23/25 2128    docusate sodium (COLACE) capsule 100 mg, 100 mg, Oral, BID, Jessica Arreola, DO, 100 mg at 05/23/25 1811    heparin (porcine) subcutaneous injection 5,000 Units, 5,000 Units, Subcutaneous, Q8H [facility], Crispin Arana MD, 5,000 Units at 05/24/25 0528    insulin lispro (HumALOG/ADMELOG) 100 units/mL subcutaneous injection 1-5 Units, 1-5 Units, Subcutaneous, TID AC, 1 Units at 05/23/25 1729 **AND** Fingerstick Glucose (POCT), , , TID AC, PATI Porras    insulin lispro (HumALOG/ADMELOG) 100 units/mL subcutaneous injection 1-5 Units, 1-5 Units, Subcutaneous, HS, PATI Porras, 1 Units at 05/23/25 2132    lactulose (CHRONULAC) oral solution 20 g, 20 g, Oral, Daily PRN, Joshua Kaminski DO, 20 g at 05/14/25 1805    leucovorin (WELLCOVORIN) tablet 25 mg, 25 mg, Oral, Q6H, Nicki Renteria MD    melatonin tablet 6 mg, 6 mg, Oral, HS, Crispin Arana MD, 6 mg at 05/23/25 2128    metFORMIN (GLUCOPHAGE-XR) 24 hr tablet 1,000 mg, 1,000 mg, Oral, Daily, PATI Porras, 1,000 mg at 05/23/25 0845    multi-electrolyte (Plasmalyte-A/Isolyte-S PH 7.4/Normosol-R) IV solution, 75 mL/hr, Intravenous, Continuous, Johnny Naik MD, Last Rate: 75 mL/hr at 05/24/25 0107, 75 mL/hr at 05/24/25 0107    OLANZapine (ZyPREXA) IM injection 5 mg, 5 mg, Intramuscular, BID PRN, Crispin Arana MD    ondansetron (ZOFRAN) injection 4 mg, 4 mg, Intravenous, Q6H PRN, Crispin Arana MD    oxyCODONE (ROXICODONE) split tablet 2.5 mg, 2.5 mg, Oral, Q4H PRN, 2.5 mg at 05/16/25 0844 **OR** oxyCODONE (ROXICODONE) IR tablet 5 mg, 5 mg, Oral, Q4H PRN, Crispin Arana MD, 5 mg at 05/18/25 0221    pantoprazole (PROTONIX) EC tablet 40 mg, 40 mg, Oral, Early Morning, Crispin Arana MD, 40 mg at  05/24/25 0528    polyethylene glycol (MIRALAX) packet 17 g, 17 g, Oral, Daily PRN, Skyler Hendrickson MD    QUEtiapine (SEROquel) tablet 12.5 mg, 12.5 mg, Oral, Daily, Crispin Arana MD, 12.5 mg at 05/23/25 1121    QUEtiapine (SEROquel) tablet 50 mg, 50 mg, Oral, HS, Crispin Arana MD, 50 mg at 05/23/25 2127    senna (SENOKOT) tablet 17.2 mg, 2 tablet, Oral, Daily With Lunch, Jessica Arreola DO, 17.2 mg at 05/23/25 1121    sitaGLIPtin (JANUVIA) tablet 50 mg, 50 mg, Oral, Daily, PATI Porras, 50 mg at 05/23/25 0845    sodium chloride 0.9 % infusion, 20 mL/hr, Intravenous, Once PRN, Satnam Kebede MD    sodium chloride 0.9 % infusion, 20 mL/hr, Intravenous, Once PRN, Satnam Kebede MD

## 2025-05-24 NOTE — PROGRESS NOTES
Progress Note - PMR   Name: Alexis Dennison 64 y.o. 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 Can discharge once level in desired range. Anticipate this may be tomorrow and discussed with nursing to inform his wife Naldo if level is appropriate.  - Onc to set-up outpatient Rituximab infusions for after discharge  - Daily methotrexate levels  - Hem/Onc following  - NSGY following  - SLP for cognition  Type 2 diabetes mellitus with hyperglycemia, without long-term current use of insulin (HCC)  Lab Results   Component Value Date    HGBA1C 6.6 (H) 02/22/2025       Recent Labs     05/23/25  1537 05/23/25  2022 05/24/25  0724 05/24/25  1034   POCGLU 150* 163* 94 138       Blood Sugar Average: Last 72 hrs:  (P) [id number]    - Metformin 1000mg daily restarted 5/22  - Lantus 10u daily with Lispro 8u TID and SSI  - Monitor accuchecks while on steroids  - Management per IM  HTN,  goal below 130/80  > Blood pressure controlled off antihypertensives    - Monitor VS  - Management per IM  Mixed hyperlipidemia  - Continue atorvastatin 10 mg daily  Thyroid nodule  Outpatient endocrine follow-up needed  Pulmonary nodule  > CTA 3/29: nonspecific 4 mm RLL pulmonary nodule   - follow up outpatient for repeat imaging in 3 months  Liver lesion  > CTA 3/29- nonspecific 12mm hypodense right hepatic lesion, recommended MRI abdomen  > MRI abdomen 3/30- simple right hepatic lobe cyst    - Follow up outpatient for monitoring  Ambulatory dysfunction  - Fall precautions  - PT/OT  Encephalopathy  > Patient has been pleasantly confused for months in the setting of brain mass.  > acutely worsened due to UTI and bacteremia. S/p 7 day course of antibx  > Continues to lack insight to his current situation and is severely cognitively impaired.  He does not get agitated for long periods of time and is mostly confused and impulsive.  > A&O 1 on [facility] admission    - Continue seroquel 12.5mg at noon with 50mg HS  - PRN Zyprexa 2.5mg IM as needed for agitation  -Overstimulation precautions, frequent re-orientation, re-direction, re-assurance  -Sleep log and agitation monitoring if needed  -Optimize sleep-wake cycle  -Limit sedating medications when possible  - Ensure optimal management electrolytes, nutrition, and hydration  - Ensure optimal bowel/bladder management  - Ensure optimal pain management   -Patient/family/caregiver education and training   -Continue 1:1 due to overall safety, impulsivity, lack of safety awareness and poor insight.  -Fall precautions with frequent rounding; proactive toileting program, patient should not be unattended in bathroom      LETY (acute kidney injury) (HCC)  > Baseline Cr 0.9  > Recently 1.3 improved from before- peak 2.7  > Now s/p bicarb    - IVF per IM/Nephro  - Management at discretion of nephro   E. coli UTI  > Pet met sepsis criteria on 4/29 with confusion and agitation. UA was  positive. Urine culture and blood cultures showed Ecoli sensitive to cephalosporins.  > ID consulted and pt completed 7d course of ceftriaxone  > Approved to restart chemo 5/2.  > Chemo on hold for GNR UTI and bacteremia > restarted chemo on 5/21    - Monitor while on antibiotics  Impaired mobility and activities of daily living  Patient was evaluated by the rehabilitation team MD and an appropriate candidate for acute inpatient rehabilitation program at this time.  The patient will tolerate 3 hours/day 5 to 7 days/week of intensive physical, occupational in order to obtain goals for community discharge  Due to the patient's functional Compared to their baseline level of function in addition to their ongoing medical needs, the patient would benefit from daily supervision from a rehabilitation physician as well as rehabilitation nursing to implement and adjust the medical as well as functional plan of care in order to meet the patient's goals.  HTN (hypertension)  Had been on losartan but holding at this time- his blood pressures are good  Transaminitis  CMP checked on 5/9 and AST is down to 57 from 111 and ALT to 281 from 322  5/19 labs: AST 11 and ALT 25.  5/23- AST 89 and  - monitor routinely while on MTX  Hyponatremia  Sodium stable at 140 on 5/19 labs  5/23- Na 139 and stalbe  Thrombocytopenia (HCC)  5/19- Platelets down to 79K- will need to continue monitoring with chemotherapy now on hold  5/23- Plts improved to 153K from 145K  Sepsis (HCC)  5/19- Sepsis alert over the weekend.  CT A/P showed perinephric stranding. UA abnormal and Ucx grew GNR in urine. Blood cultures also + for GNR. Lactate 4.4>5.1. procal 7.39> 6.55. Started on IV antibiotics and Chemo on hold per Onc  5/22- Cleared by ID to restart chemo. Transitioned to PO antibiotics  - Continue antibiotics  - Monitor WBC on routine labs  - Follow temp curve  Recurrent E. coli bacteremia  5/20- Antibx changed from cefepime to ceftriaxone per ID  recommendations. Need to follow up final susceptibilities and adjust antibiotics accordingly  Now on oral Duricef, no longer on IV medications  Leukopenia  5/19- WBC improving. Now 3.93 from 1.84 off chemo  5/23- WBC stable at 6.54  Anemia  5/19 - hemoglobin of 8.0 - improved from 6.7 after transfusion of 1u pRBC over the weekend  5/23- Hgb 9.9  -- improved from 8.9 yesterday  At risk for acid-base imbalance  Urine pH testing and sodium bicarb drip with MTX treatments > restarted now on bicarb 100 ml/hr  Electrolyte imbalance risk    SIRS (systemic inflammatory response syndrome) (HCC)    Ankle edema, bilateral      Subjective   Patient is a 64-year-old male with history of hyperlipidemia hypertension, GERD, type 2 diabetes, nonalcoholic fatty liver disease and sleep apnea presenting on 3/29 for increased confusion after recently being diagnosed with a brain mass underwent workup with consistent findings of a CNS lymphoma noted treated with methotrexate and Rituxan and course complicated LETY, delirium and agitation as well as an E. coli UTI and bacteremia     Chief Complaint: f/u non-traumatic brain injury and f/u ambulatory dysfunction    Interval: Patient seen and examined in room. No acute issues overnight. Mtx level last evening down to 0.40 and awaiting drop to below 0.10 in order to discharge home. Anticipate this may be tomorrow and will need nursing to notify the wife, Naldo if this criteria is met as it is drawn in the evening in order to allow her time to prepare to bring Alexis home. I spoke with nursing today to pass this information on. Patient denies fever, chills, nausea, emesis, cough, shortness of breath, diarrhea, or constipation. Sleep was fine, mood stable. Pain is controlled. Last BM 5/23. Labs reviewed today. On CBC, the hgb slightly lower at 8.7, platelets stable. On CMP all WNL except AST/ALT which have been coming down.    Objective :  Temp:  [97.5 °F (36.4 °C)-98.6 °F (37 °C)] 97.5 °F (36.4  °C)  HR:  [71-83] 71  BP: (116-133)/(57-76) 133/72  Resp:  [17-18] 18  SpO2:  [95 %-97 %] 96 %  O2 Device: None (Room air)    Functional Update:  Physical Therapy Occupational Therapy Speech Therapy   Weight Bearing Status: Full Weight Bearing  Transfers: Supervision  Bed Mobility: Supervision  Amulation Distance (ft): 250 feet  Ambulation: Supervision  Assistive Device for Ambulation: Roller Walker (and with no AD)  Number of Stairs: 12  Assistive Device for Stairs: Right Hand Rail  Stair Assistance: Supervision  Ramp: Incidental Touching  Assistive Device for Ramp: None  Discharge Recommendations: Home with:  DC Home with:: 24 Hour Supervision   Eating:  (setup)  Grooming: Supervision  Bathing: Supervision, Incidental Touching  Bathing: Supervision, Incidental Touching  Upper Body Dressing: Supervision  Lower Body Dressing: Supervision  Toileting: Supervision, Incidental Touching  Tub/Shower Transfer: Incidental Touching  Toilet Transfer: Incidental Touching  Cognition: Exceptions to WNL  Cognition: Decreased Memory, Decreased Comprehension, Decreased Safety, Decreased Executive Functions, Decreased Attention  Orientation: Person, Place   Mode of Communication: Verbal  Speech/Language:  (word finding difficulty)  Cognition: Exceptions to WNL  Cognition: Decreased Memory, Decreased Executive Functions, Decreased Attention, Decreased Comprehension, Decreased Safety  Orientation: Person  Discharge Recommendations: Home with:  DC Home with:: 24 Hour Supervision, 24 Hour Assisteance, Family Support, Home Speech Therapy, Outpatient Speech Therapy       Physical Exam  Vitals reviewed.   Constitutional:       Appearance: Normal appearance.   HENT:      Head: Normocephalic.      Comments: Biopsy site healing well     Right Ear: External ear normal.      Left Ear: External ear normal.      Nose: Nose normal. No rhinorrhea.      Mouth/Throat:      Mouth: Mucous membranes are moist.      Pharynx: Oropharynx is clear.      Eyes:      General: No scleral icterus.      Cardiovascular:      Rate and Rhythm: Normal rate.   Pulmonary:      Effort: Pulmonary effort is normal. No respiratory distress.   Abdominal:      General: There is no distension.      Palpations: Abdomen is soft.     Musculoskeletal:      Right lower leg: No edema.      Left lower leg: No edema.     Skin:     General: Skin is warm and dry.     Neurological:      Mental Status: He is alert.      Motor: No weakness.      Comments: Oriented to self, limited safety awareness and insight   Psychiatric:         Mood and Affect: Mood normal.         Behavior: Behavior normal.           Scheduled Meds:  Current Facility-Administered Medications   Medication Dose Route Frequency Provider Last Rate    acetaminophen  650 mg Oral Q6H PRN Crispin Arana MD      allopurinol  300 mg Oral Daily Crispin Arana MD      alteplase  2 mg Intracatheter Q1MIN PRN Crispin Arana MD      alteplase  2 mg Intracatheter Q1MIN PRN Magali Lee MD      alteplase  2 mg Intracatheter Q1MIN PRN Satnam Kebede MD      alteplase  2 mg Intracatheter Q1MIN PRN Satnam Kebede MD      alteplase  2 mg Intracatheter Q1MIN PRN Satnam Kebede MD      aluminum-magnesium hydroxide-simethicone  30 mL Oral Q4H PRN Crispin Arana MD      atorvastatin  20 mg Oral Daily Crispin Arana MD      bisacodyl  10 mg Rectal Daily PRN Skyler Hendrickson MD      calcium carbonate  1,000 mg Oral Daily PRN Crispin Arana MD      cefadroxil  1,000 mg Oral Q12H [facility] Lisa Singh MD      chlorhexidine  15 mL Mouth/Throat Q12H [facility] Crispin Arana MD      docusate sodium  100 mg Oral BID Jessica Arreola DO      heparin (porcine)  5,000 Units Subcutaneous Q8H [facility] Crispin Arana MD      insulin lispro  1-5 Units Subcutaneous TID AC PATI Porras      insulin lispro  1-5 Units Subcutaneous HS PATI Porras      lactulose  20 g Oral Daily PRN Joshua Kaminski,       leucovorin  25 mg Oral Q6H Nicki Renteria MD       melatonin  6 mg Oral HS Crispin Arana MD      metFORMIN  1,000 mg Oral Daily PATI Porras      OLANZapine  5 mg Intramuscular BID PRN Crispin Arana MD      ondansetron  4 mg Intravenous Q6H PRN Crispin Arana MD      oxyCODONE  2.5 mg Oral Q4H PRN Crispin Arana MD      Or    oxyCODONE  5 mg Oral Q4H PRN Crispin Arana MD      pantoprazole  40 mg Oral Early Morning Crispin Arana MD      polyethylene glycol  17 g Oral Daily PRN Skyler Hendrickson MD      QUEtiapine  12.5 mg Oral Daily Crispin Arana MD      QUEtiapine  50 mg Oral HS Crispin Arana MD      senna  2 tablet Oral Daily With Lunch Jessica Arreola DO      sitaGLIPtin  50 mg Oral Daily PATI Porras      sodium chloride  20 mL/hr Intravenous Once PRN Satnam Kebede MD      sodium chloride  20 mL/hr Intravenous Once PRN Satnam Kebede MD           Lab Results: I have reviewed the following results:  Results from last 7 days   Lab Units 05/24/25  0538 05/23/25  1113 05/22/25  0613   HEMOGLOBIN g/dL 8.7* 9.9* 8.9*   HEMATOCRIT % 25.8* 30.1* 25.7*   WBC Thousand/uL 4.68 6.54 9.85   PLATELETS Thousands/uL 148* 153 145*     Results from last 7 days   Lab Units 05/24/25  0538 05/23/25  1113 05/23/25  0529   BUN mg/dL 19 19 20   SODIUM mmol/L 139 139 142   POTASSIUM mmol/L 4.1 3.7 3.8   CHLORIDE mmol/L 103 98 99   CREATININE mg/dL 0.97 1.23 1.24   AST U/L 47* 89* 100*   ALT U/L 123* 158* 150*                Joshua Kaminski, DO  Physical Medicine and Rehabilitation  [facility]    I have spent a total time of 35 minutes in caring for this patient on the day of the visit/encounter including Counseling / Coordination of care, Documenting in the medical record, Reviewing/placing orders in the medical record (including tests, medications, and/or procedures), and Communicating with other healthcare professionals .

## 2025-05-24 NOTE — PROGRESS NOTES
Progress Note - Oncology-Medical   Name: Alexis Dennison 64 y.o. male I MRN: [id number]  Unit/Bed#: -01 I Date of Admission: 5/7/2025   Date of Service: 5/24/2025 I Hospital Day: 17     Assessment & Plan  Primary central nervous system (CNS) lymphoma  64 yoM with PMHx of DM2, NAFLD, and HT who presented with progressive encephalopathy found to have multiple scattered nodular cerebral and cerebellar enhancement who underwent two non-diagnostic LPs prompting stereotactic brain biopsy (4/14) which showed large B-cell lymphoma. See oncology progress note on 4/21 for full diagnostic work up. On 4/29, agitation led to infectious work up with showed UA+ and BCx with 2/2 E. Coli sensitive to ceftriaxone. ID was consulted is managing and has approved to restart chemo 5/2.      Treatment Plan: regimen modified from (https://pubmed.ncbi.nlm.nih.gov/02735473/)     C1 (4/17) 8 g/m²  C2 (5/2) 3 g/m², dose-reduced due to LETY, delayed x1 day due to E. coli bacteremia.  C3 (was planned for 5/17) delayed due to E. coli bacteremia, s/p MTX 6 g/m2 on 5/21.    Recommendations:  Urine pH remained above 7.0.  24-hour post administration methotrexate 5.0, patient started on IV leucovorin, methotrexate level 48 hours came back at 0.4, leucovorin is transition to oral, continue same and have extended to 6 additional dose.    Continue to monitor methotrexate level daily and urine pH until levels are less than 0.10.  Will also continue leucovorin rescue.  Patient had outpatient appointment with Dr. Phan on 5/23 that will be moved given delay in his treatment and discharge.  Status post Rituxan on 5/23/2025.  Mild transaminitis noted, no dose adjustment required for Rituxan.  Continue to monitor CBC and CMP daily until methotrexate levels cleared.  Thyroid nodule  Patient incidentally found to have thyroid nodule, was recommended ultrasound thyroid.  Pulmonary nodule  CTA from 3/29/2025 with nonspecific 4 mm right lower lobe pulmonary  nodule, recommended 3 months follow-up.  Transaminitis  Patient noted with transaminitis, LFTs has been fluctuating,   Could be due to recent chemotherapy versus antibiotics versus liver lesions.  CMP from this morning within normal limit.  Thrombocytopenia (HCC)  Patient does have thrombocytopenia at baseline but usually his platelet counts have been above 100 K,  This morning noted with worsening thrombocytopenia, suspected underlying infectious etiology.  Continue to monitor CBC daily, will need platelet transfusion if counts drop below 20K or in setting of any spontaneous bleeding.  Anemia  Likely multifactorial, hemoglobin has been in range of 9.  Will continue to monitor.  Ankle edema, bilateral  Mild pedal edema noted, no calf tenderness, likely in setting of IV fluids, however will need to continue IV fluids until methotrexate levels are less than 0.10.    Outpatient follow up plan: Outpatient appointment rescheduled for 6/3/2025.    Communication with patient/family:  I did update the patient.    Communication with team:  Did communicate with  primary team. I also spoke with pharmacy regarding methotrexate      I did review this patient with Dr. Roberson and they are in agreement with this plan.          Subjective:  Patient seen at bedside, reports feeling better this morning. Had breakfast and was able to eat 90% of portion.     Review of Systems   Constitutional:  Negative for appetite change, fatigue and unexpected weight change.   Respiratory:  Negative for chest tightness and shortness of breath.    Cardiovascular:  Negative for chest pain and palpitations.   Gastrointestinal:  Negative for abdominal pain, constipation and vomiting.   Genitourinary:  Negative for enuresis and frequency.   Musculoskeletal:  Negative for arthralgias and joint swelling.   Skin:  Negative for color change and rash.   Neurological:  Negative for dizziness and facial asymmetry.   All other systems reviewed and are  negative.         Objective:     Medication Administration - last 24 hours from 05/23/2025 0919 to 05/24/2025 0919         Date/Time Order Dose Route Action Action by     05/23/2025 2128 EDT chlorhexidine (PERIDEX) 0.12 % oral rinse 15 mL 15 mL Mouth/Throat Given Maribell Catherine, ADELINE     05/24/2025 0528 EDT pantoprazole (PROTONIX) EC tablet 40 mg 40 mg Oral Given Aida Buchanan, ADELINE     05/24/2025 0528 EDT heparin (porcine) subcutaneous injection 5,000 Units 5,000 Units Subcutaneous Given Aida Buchanan RN     05/23/2025 2128 EDT heparin (porcine) subcutaneous injection 5,000 Units 5,000 Units Subcutaneous Given Maribell Catherine, ADELINE     05/23/2025 1510 EDT heparin (porcine) subcutaneous injection 5,000 Units 5,000 Units Subcutaneous Given Aida Buchanan, ADELINE     05/23/2025 2128 EDT melatonin tablet 6 mg 6 mg Oral Given Maribell Catherine RN     05/23/2025 1121 EDT QUEtiapine (SEROquel) tablet 12.5 mg 12.5 mg Oral Given Aida Buchanan, ADELINE     05/23/2025 2127 EDT QUEtiapine (SEROquel) tablet 50 mg 50 mg Oral Given Maribell Catherine RN     05/23/2025 1811 EDT docusate sodium (COLACE) capsule 100 mg 100 mg Oral Given Aida Buchanan RN     05/23/2025 1121 EDT senna (SENOKOT) tablet 17.2 mg 17.2 mg Oral Given Aida Buchanan RN     05/24/2025 0724 EDT insulin lispro (HumALOG/ADMELOG) 100 units/mL subcutaneous injection 1-5 Units -- Subcutaneous Not Given Ketan Rehman, ADELINE     05/23/2025 1729 EDT insulin lispro (HumALOG/ADMELOG) 100 units/mL subcutaneous injection 1-5 Units 1 Units Subcutaneous Given Aida Buchanan RN     05/23/2025 1122 EDT insulin lispro (HumALOG/ADMELOG) 100 units/mL subcutaneous injection 1-5 Units 1 Units Subcutaneous Given Aida Buchanan RN     05/23/2025 2132 EDT insulin lispro (HumALOG/ADMELOG) 100 units/mL subcutaneous injection 1-5 Units 1 Units Subcutaneous Given Maribell Catherine RN     05/23/2025 6127 EDT riTUXimab (RITUXAN) 800 mg in sodium chloride 0.9 % 320 mL first titrated chemo infusion 0 mg  Intravenous Stopped Narcisa Acosta RN     05/23/2025 1828 EDT riTUXimab (RITUXAN) 800 mg in sodium chloride 0.9 % 320 mL first titrated chemo infusion -- Intravenous Rate/Dose Change Narcisa Acosta, ADELINE     05/23/2025 1752 EDT riTUXimab (RITUXAN) 800 mg in sodium chloride 0.9 % 320 mL first titrated chemo infusion -- Intravenous Rate/Dose Change Narcisa Acosta RN     05/23/2025 1711 EDT riTUXimab (RITUXAN) 800 mg in sodium chloride 0.9 % 320 mL first titrated chemo infusion -- Intravenous Rate/Dose Change Narcisa Acosta RN     05/23/2025 1632 EDT riTUXimab (RITUXAN) 800 mg in sodium chloride 0.9 % 320 mL first titrated chemo infusion -- Intravenous Rate/Dose Change Narcisa Acosta RN     05/23/2025 1601 EDT riTUXimab (RITUXAN) 800 mg in sodium chloride 0.9 % 320 mL first titrated chemo infusion -- Intravenous Rate/Dose Change Narcisa Acosta RN     05/23/2025 1520 EDT riTUXimab (RITUXAN) 800 mg in sodium chloride 0.9 % 320 mL first titrated chemo infusion -- Intravenous Rate/Dose Change Narcisa Acosta RN     05/23/2025 1449 EDT riTUXimab (RITUXAN) 800 mg in sodium chloride 0.9 % 320 mL first titrated chemo infusion -- Intravenous Rate/Dose Change Narcisa Acosta RN     05/23/2025 1405 EDT riTUXimab (RITUXAN) 800 mg in sodium chloride 0.9 % 320 mL first titrated chemo infusion 800 mg Intravenous New Bag Narcisa Acosta, ADELINE     05/23/2025 1202 EDT leucovorin (WELLCOVORIN) tablet 25 mg 25 mg Oral Given Aida Buchanan, RN     05/23/2025 2129 EDT cefadroxil (DURICEF) capsule 1,000 mg 1,000 mg Oral Given Maribell Catherine, RN     05/24/2025 0230 EDT leucovorin 25 mg in dextrose 5 % 50 mL IVPB 0 mg Intravenous Stopped Kristofer Salmon, ADELINE     05/24/2025 0147 EDT leucovorin 25 mg in dextrose 5 % 50 mL IVPB 25 mg Intravenous New Bag Kristofer Salmon RN     05/23/2025 2330 EDT leucovorin 25 mg in dextrose 5 % 50 mL IVPB -- Intravenous Canceled Entry Maribell Catherine RN     05/23/2025 2007 EDT leucovorin 25 mg in  "dextrose 5 % 50 mL IVPB 25 mg Intravenous New Bag Maribell Catherine, ADELINE     05/23/2025 1217 EDT leucovorin 25 mg in dextrose 5 % 50 mL IVPB 25 mg Intravenous New Bag Aida Buchanan RN     05/24/2025 0107 EDT multi-electrolyte (Plasmalyte-A/Isolyte-S PH 7.4/Normosol-R) IV solution 75 mL/hr Intravenous New Bag Kristofer Luis Antonio, ADELINE     05/23/2025 1002 EDT multi-electrolyte (Plasmalyte-A/Isolyte-S PH 7.4/Normosol-R) IV solution 75 mL/hr Intravenous New Bag Aida Buchanan RN     05/23/2025 1202 EDT dexamethasone (DECADRON) tablet 2 mg 2 mg Oral Given Aida Buchanan RN            /72 (BP Location: Right arm)   Pulse 71   Temp 97.5 °F (36.4 °C) (Oral)   Resp 18   Ht 5' 10\" (1.778 m)   Wt 79.4 kg (175 lb 0.7 oz)   SpO2 96%   BMI 25.12 kg/m²       Physical Exam  Constitutional:       Appearance: Normal appearance.   HENT:      Head: Normocephalic and atraumatic.     Cardiovascular:      Rate and Rhythm: Normal rate and regular rhythm.      Pulses: Normal pulses.      Heart sounds: Normal heart sounds.   Pulmonary:      Effort: Pulmonary effort is normal.      Breath sounds: Normal breath sounds.   Abdominal:      General: Abdomen is flat.      Palpations: Abdomen is soft.     Musculoskeletal:      Comments: Mild pedal edema.     Skin:     General: Skin is warm and dry.     Neurological:      Mental Status: He is alert.       Recent Results (from the past 48 hours)   UA (URINE) with reflex to Scope    Collection Time: 05/22/25 10:04 AM   Result Value Ref Range    Color, UA Light Yellow     Clarity, UA Clear     Specific Gravity, UA 1.013 1.003 - 1.030    pH, UA 7.5 4.5, 5.0, 5.5, 6.0, 6.5, 7.0, 7.5, 8.0    Leukocytes, UA Negative Negative    Nitrite, UA Negative Negative    Protein, UA Trace (A) Negative mg/dl    Glucose, UA Negative Negative mg/dl    Ketones, UA Negative Negative mg/dl    Urobilinogen, UA <2.0 <2.0 mg/dl mg/dl    Bilirubin, UA Negative Negative    Occult Blood, UA Negative Negative   Urine " Microscopic    Collection Time: 05/22/25 10:04 AM   Result Value Ref Range    RBC, UA 1-2 None Seen, 1-2 /hpf    WBC, UA 1-2 None Seen, 1-2 /hpf    Epithelial Cells None Seen None Seen, Occasional /hpf    Bacteria, UA None Seen None Seen, Occasional /hpf   Fingerstick Glucose (POCT)    Collection Time: 05/22/25 10:39 AM   Result Value Ref Range    POC Glucose 226 (H) 65 - 140 mg/dl   UA (URINE) with reflex to Scope    Collection Time: 05/22/25  3:27 PM   Result Value Ref Range    Color, UA Colorless     Clarity, UA Clear     Specific Gravity, UA 1.020 1.005 - 1.030    pH, UA 8.0 4.5, 5.0, 5.5, 6.0, 6.5, 7.0, 7.5, 8.0    Leukocytes, UA Negative Negative    Nitrite, UA Negative Negative    Protein, UA Negative Negative mg/dl    Glucose, UA Negative Negative mg/dl    Ketones, UA Negative Negative mg/dl    Urobilinogen, UA <2.0 <2.0 mg/dl mg/dl    Bilirubin, UA Negative Negative    Occult Blood, UA Negative Negative   Fingerstick Glucose (POCT)    Collection Time: 05/22/25  4:06 PM   Result Value Ref Range    POC Glucose 128 65 - 140 mg/dl   Methotrexate level    Collection Time: 05/22/25  4:28 PM   Result Value Ref Range    Methotrexate Lvl 5.10 umol/L   Fingerstick Glucose (POCT)    Collection Time: 05/22/25  8:48 PM   Result Value Ref Range    POC Glucose 147 (H) 65 - 140 mg/dl   UA (URINE) with reflex to Scope    Collection Time: 05/22/25  9:41 PM   Result Value Ref Range    Color, UA Colorless     Clarity, UA Clear     Specific Gravity, UA 1.009 1.003 - 1.030    pH, UA 8.0 4.5, 5.0, 5.5, 6.0, 6.5, 7.0, 7.5, 8.0    Leukocytes, UA Negative Negative    Nitrite, UA Negative Negative    Protein, UA Negative Negative mg/dl    Glucose, UA Negative Negative mg/dl    Ketones, UA Negative Negative mg/dl    Urobilinogen, UA <2.0 <2.0 mg/dl mg/dl    Bilirubin, UA Negative Negative    Occult Blood, UA Negative Negative   UA (URINE) with reflex to Scope    Collection Time: 05/23/25  3:44 AM   Result Value Ref Range    Color, UA  Colorless     Clarity, UA Clear     Specific Gravity, UA 1.009 1.003 - 1.030    pH, UA 8.5 (A) 4.5, 5.0, 5.5, 6.0, 6.5, 7.0, 7.5, 8.0    Leukocytes, UA Negative Negative    Nitrite, UA Negative Negative    Protein, UA Negative Negative mg/dl    Glucose, UA Negative Negative mg/dl    Ketones, UA Negative Negative mg/dl    Urobilinogen, UA <2.0 <2.0 mg/dl mg/dl    Bilirubin, UA Negative Negative    Occult Blood, UA Negative Negative   Comprehensive metabolic panel    Collection Time: 05/23/25  5:29 AM   Result Value Ref Range    Sodium 142 135 - 147 mmol/L    Potassium 3.8 3.5 - 5.3 mmol/L    Chloride 99 96 - 108 mmol/L    CO2 36 (H) 21 - 32 mmol/L    ANION GAP 7 4 - 13 mmol/L    BUN 20 5 - 25 mg/dL    Creatinine 1.24 0.60 - 1.30 mg/dL    Glucose 114 65 - 140 mg/dL    Glucose, Fasting 114 (H) 65 - 99 mg/dL    Calcium 8.5 8.4 - 10.2 mg/dL    Corrected Calcium 9.1 8.3 - 10.1 mg/dL     (H) 13 - 39 U/L     (H) 7 - 52 U/L    Alkaline Phosphatase 63 34 - 104 U/L    Total Protein 5.5 (L) 6.4 - 8.4 g/dL    Albumin 3.2 (L) 3.5 - 5.0 g/dL    Total Bilirubin 0.49 0.20 - 1.00 mg/dL    eGFR 60 ml/min/1.73sq m   Fingerstick Glucose (POCT)    Collection Time: 05/23/25  6:57 AM   Result Value Ref Range    POC Glucose 122 65 - 140 mg/dl   Fingerstick Glucose (POCT)    Collection Time: 05/23/25 10:29 AM   Result Value Ref Range    POC Glucose 157 (H) 65 - 140 mg/dl   CBC and differential    Collection Time: 05/23/25 11:13 AM   Result Value Ref Range    WBC 6.54 4.31 - 10.16 Thousand/uL    RBC 3.20 (L) 3.88 - 5.62 Million/uL    Hemoglobin 9.9 (L) 12.0 - 17.0 g/dL    Hematocrit 30.1 (L) 36.5 - 49.3 %    MCV 94 82 - 98 fL    MCH 30.9 26.8 - 34.3 pg    MCHC 32.9 31.4 - 37.4 g/dL    RDW 15.4 (H) 11.6 - 15.1 %    MPV 10.5 8.9 - 12.7 fL    Platelets 153 149 - 390 Thousands/uL   Comprehensive metabolic panel    Collection Time: 05/23/25 11:13 AM   Result Value Ref Range    Sodium 139 135 - 147 mmol/L    Potassium 3.7 3.5 - 5.3  mmol/L    Chloride 98 96 - 108 mmol/L    CO2 34 (H) 21 - 32 mmol/L    ANION GAP 7 4 - 13 mmol/L    BUN 19 5 - 25 mg/dL    Creatinine 1.23 0.60 - 1.30 mg/dL    Glucose 162 (H) 65 - 140 mg/dL    Calcium 8.8 8.4 - 10.2 mg/dL    AST 89 (H) 13 - 39 U/L     (H) 7 - 52 U/L    Alkaline Phosphatase 64 34 - 104 U/L    Total Protein 5.9 (L) 6.4 - 8.4 g/dL    Albumin 3.5 3.5 - 5.0 g/dL    Total Bilirubin 0.46 0.20 - 1.00 mg/dL    eGFR 61 ml/min/1.73sq m   Fingerstick Glucose (POCT)    Collection Time: 05/23/25  3:37 PM   Result Value Ref Range    POC Glucose 150 (H) 65 - 140 mg/dl   Methotrexate level    Collection Time: 05/23/25  5:48 PM   Result Value Ref Range    Methotrexate Lvl 0.40 umol/L   Fingerstick Glucose (POCT)    Collection Time: 05/23/25  8:22 PM   Result Value Ref Range    POC Glucose 163 (H) 65 - 140 mg/dl   Comprehensive metabolic panel    Collection Time: 05/24/25  5:38 AM   Result Value Ref Range    Sodium 139 135 - 147 mmol/L    Potassium 4.1 3.5 - 5.3 mmol/L    Chloride 103 96 - 108 mmol/L    CO2 30 21 - 32 mmol/L    ANION GAP 6 4 - 13 mmol/L    BUN 19 5 - 25 mg/dL    Creatinine 0.97 0.60 - 1.30 mg/dL    Glucose 107 65 - 140 mg/dL    Calcium 8.9 8.4 - 10.2 mg/dL    Corrected Calcium 9.5 8.3 - 10.1 mg/dL    AST 47 (H) 13 - 39 U/L     (H) 7 - 52 U/L    Alkaline Phosphatase 54 34 - 104 U/L    Total Protein 5.4 (L) 6.4 - 8.4 g/dL    Albumin 3.2 (L) 3.5 - 5.0 g/dL    Total Bilirubin 0.57 0.20 - 1.00 mg/dL    eGFR 81 ml/min/1.73sq m   CBC and differential    Collection Time: 05/24/25  5:38 AM   Result Value Ref Range    WBC 4.68 4.31 - 10.16 Thousand/uL    RBC 2.81 (L) 3.88 - 5.62 Million/uL    Hemoglobin 8.7 (L) 12.0 - 17.0 g/dL    Hematocrit 25.8 (L) 36.5 - 49.3 %    MCV 92 82 - 98 fL    MCH 31.0 26.8 - 34.3 pg    MCHC 33.7 31.4 - 37.4 g/dL    RDW 15.1 11.6 - 15.1 %    MPV 10.2 8.9 - 12.7 fL    Platelets 148 (L) 149 - 390 Thousands/uL    nRBC 0 /100 WBCs    Segmented % 63 43 - 75 %    Immature  Grans % 2 0 - 2 %    Lymphocytes % 31 14 - 44 %    Monocytes % 4 4 - 12 %    Eosinophils Relative 0 0 - 6 %    Basophils Relative 0 0 - 1 %    Absolute Neutrophils 2.92 1.85 - 7.62 Thousands/µL    Absolute Immature Grans 0.08 0.00 - 0.20 Thousand/uL    Absolute Lymphocytes 1.47 0.60 - 4.47 Thousands/µL    Absolute Monocytes 0.18 0.17 - 1.22 Thousand/µL    Eosinophils Absolute 0.02 0.00 - 0.61 Thousand/µL    Basophils Absolute 0.01 0.00 - 0.10 Thousands/µL   Fingerstick Glucose (POCT)    Collection Time: 05/24/25  7:24 AM   Result Value Ref Range    POC Glucose 94 65 - 140 mg/dl       CT abdomen pelvis w contrast  Result Date: 5/19/2025  Narrative: CT ABDOMEN AND PELVIS WITH IV CONTRAST INDICATION: recurrent bacteremia, look for source. Blood culture and urine culture positive for gram-negative rods. COMPARISON: April 30, 2025. TECHNIQUE: CT examination of the abdomen and pelvis was performed. Multiplanar 2D reformatted images were created from the source data. This examination, like all CT scans performed in the [facility], was performed utilizing techniques to minimize radiation dose exposure, including the use of iterative reconstruction and automated exposure control. Radiation dose length product (DLP) for this visit: 390.85 mGy-cm. IV Contrast: 85 mL of iohexol (OMNIPAQUE) Enteric Contrast: Not administered. FINDINGS: ABDOMEN LOWER CHEST: A small calcified granuloma is redemonstrated anteriorly at the right lung base. There is coronary artery disease. A catheter terminates in the right atrium. LIVER/BILIARY TREE: There is a 13 x 9 mm cyst posteriorly in the right lobe of the liver, similar to the prior study. GALLBLADDER: No calcified gallstones. No pericholecystic inflammatory change. SPLEEN: Unremarkable. PANCREAS: Unremarkable. ADRENAL GLANDS: Unremarkable. KIDNEYS/URETERS: Mild bilateral nonspecific perinephric stranding, increased somewhat compared with April 30, 2025. No hydronephrosis  bilaterally. STOMACH AND BOWEL: No bowel obstruction. No pericolonic inflammatory change. APPENDIX: Normal. ABDOMINOPELVIC CAVITY: There is a small amount of free fluid, best demonstrated in the right lower quadrant and paracolic gutters. No pneumoperitoneum. VESSELS: Atherosclerosis. No abdominal aortic aneurysm. PELVIS REPRODUCTIVE ORGANS: Unremarkable for patient's age. URINARY BLADDER: Unremarkable. ABDOMINAL WALL/INGUINAL REGIONS: There is some gas within the subcutaneous fat of the anterior abdominal walls near the level of the umbilicus, left more so than right, possibly related to recent injections. Small fat-containing left inguinal hernia. BONES: No acute fracture or suspicious osseous lesion. Spinal degenerative changes, most notably at L5-S1.     Impression: There is mild bilateral nonspecific perinephric stranding, however, this does appear somewhat increased compared with April 30, 2025. Correlation with urinalysis and urine culture and sensitivity is recommended. There is a small amount of free fluid. Other nonemergent findings as above. Workstation performed: [id number]     CT abdomen pelvis wo contrast  Result Date: 4/30/2025  Narrative: CT ABDOMEN AND PELVIS WITHOUT IV CONTRAST INDICATION: Gram-negative bacteremia, evaluate for source infection. COMPARISON: None. TECHNIQUE: CT examination of the abdomen and pelvis was performed without intravenous contrast. Multiplanar 2D reformatted images were created from the source data. This examination, like all CT scans performed in the [facility], was performed utilizing techniques to minimize radiation dose exposure, including the use of iterative reconstruction and automated exposure control. Radiation dose length product (DLP) for this visit: 560.94 mGy-cm. Enteric Contrast: Not administered. FINDINGS: ABDOMEN LOWER CHEST: Calcified granuloma in the right middle lobe. LIVER/BILIARY TREE: 1 cm cyst in the right lobe. Liver is otherwise  unremarkable.1 GALLBLADDER: No calcified gallstones. No pericholecystic inflammatory change. SPLEEN: Unremarkable. PANCREAS: Unremarkable. ADRENAL GLANDS: Unremarkable. KIDNEYS/URETERS: Unremarkable. No hydronephrosis. STOMACH AND BOWEL: Large amount of formed stool throughout the entire colon. No evidence of obstruction. APPENDIX: Normal. ABDOMINOPELVIC CAVITY: No ascites. No pneumoperitoneum. No lymphadenopathy. VESSELS: Unremarkable for patient's age. PELVIS REPRODUCTIVE ORGANS: Unremarkable for patient's age. URINARY BLADDER: Unremarkable. ABDOMINAL WALL/INGUINAL REGIONS: Mild infiltration of the anterior abdominal wall in the left lower quadrant, possibly a soft tissue contusion. BONES: No acute fracture or suspicious osseous lesion.     Impression: No acute inflammatory process in the abdomen or pelvis. Large amount of stool throughout the colon. Workstation performed: [id number]     XR abdomen obstruction series  Result Date: 4/30/2025  Narrative: XR ABDOMEN OBSTRUCTION SERIES INDICATION: Constipation, lethargy, gram-negative bacteremia. Confusion, newly diagnosed intracranial mass. Constipation. COMPARISON: MRI abdomen 3/30/2025 and CT chest abdomen pelvis 3/29/2025 FINDINGS: Large volume of colonic stool. Otherwise unremarkable bowel gas pattern. No pneumoperitoneum or abnormal air-fluid levels. No pathologic calcification or soft tissue mass. Bones are unremarkable for patient's age. Examination of the chest reveals clear lungs and a normal cardiomediastinal silhouette. Right PICC with tip projecting over the superior cavoatrial junction.     Impression: 1.  Large colonic stool burden without obstruction or other acute abdominal findings. 2.  No acute cardiopulmonary findings. Resident: CINTHYA FAJARDO I, the attending radiologist, have reviewed the images and agree with the final report above. Workstation performed: [id number]       I have personally reviewed labs, imaging studies, and pertinent reports.       This note has been generated by voice recognition software system.  Therefore, there may be spelling, grammar, and or syntax errors. Please contact if questions arise.

## 2025-05-24 NOTE — ASSESSMENT & PLAN NOTE
Hematology is following.     Complicated by obstructive hydrocephalus, status post EVD on 4/13 through 4/19.    Status post rituximab, status post high-dose methotrexate and intrathecal cytarabine on 4/17.  Repeat dose of rituximab on 5/10  Methotrexate was on hold earlier in the week due to suspected sepsis with blood culture positive for gram-negative katherine and urine culture positive for gram-negative katherine.  Seen by ID, status post IV antibiotics and now switched to Duricef on 5/20, was cleared for chemotherapy.    Patient received IV methotrexate on 5/21.  Continue to monitor renal function, continue IV fluid for another 24 hours then stop

## 2025-05-24 NOTE — ASSESSMENT & PLAN NOTE
"Patient with development of worsening confusion, and was seen in the ED on 3/24/2025.  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  Lymphoma/leukemia panel was nondiagnostic  CTH 4/3/25: concerning for worsening hydrocephalus.   Repeat LP 4/7/25 = undiagnostic again   MRI brain 4/11/25: \"Interval enlargement of the dominant left anterior basal ganglionic mass lesion with greater surrounding vasogenic edema and mass effect. 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  S/p EVD, self removed 4/26  S/p Keppra 500mg BID x 7 days for postoperative seizure ppx   Started on chemotherapy during hospital stay and is for weekly Rituximab and Methotrexate every 2 weeks  S/p Dexamethasone taper = LD was on 5/19/25  Maintain PICC line  Had Rituxan 5/10/25, 5/23/25  Did get Filgrastim 5/17 and 5/18  Was due for Methotrexate on 5/17 but + sepsis w/bacteremia.   ID cleared pt to get chemo and he did get the Methotrexate 5/21/25 and Decadron 10 mg IV x 1, Zofran 16mg IV  Renal maintaining bicarb drip and pt getting q6hr UAs for urine pH --> bicarb drip now stopped 5/23 and now switched to Isolyte @75ml/hr.    Get Leucovorin q6hrs x 6 doses   To get Methotrexate levels until <0.10. 0.4 5/23/25  For daily CBC, CMP  Pt has an appt with Dr Phan on 6/3/25   "

## 2025-05-24 NOTE — PLAN OF CARE
Problem: PAIN - ADULT  Goal: Verbalizes/displays adequate comfort level or baseline comfort level  Description: Interventions:- Encourage patient to monitor pain and request assistance- Assess pain using appropriate pain scale- Administer analgesics based on type and severity of pain and evaluate response- Implement non-pharmacological measures as appropriate and evaluate response- Consider cultural and social influences on pain and pain management- Notify physician/advanced practitioner if interventions unsuccessful or patient reports new pain  Outcome: Progressing     Problem: INFECTION - ADULT  Goal: Absence or prevention of progression during hospitalization  Description: INTERVENTIONS:- Assess and monitor for signs and symptoms of infection- Monitor lab/diagnostic results- Monitor all insertion sites, i.e. indwelling lines, tubes, and drains- Monitor endotracheal if appropriate and nasal secretions for changes in amount and color- Hilton appropriate cooling/warming therapies per order- Administer medications as ordered- Instruct and encourage patient and family to use good hand hygiene technique- Identify and instruct in appropriate isolation precautions for identified infection/condition  Outcome: Progressing     Problem: SAFETY ADULT  Goal: Patient will remain free of falls  Description: INTERVENTIONS:- Educate patient/family on patient safety including physical limitations- Instruct patient to call for assistance with activity - Consult OT/PT to assist with strengthening/mobility - Keep Call bell within reach- Keep bed low and locked with side rails adjusted as appropriate- Keep care items and personal belongings within reach- Initiate and maintain comfort rounds- Make Fall Risk Sign visible to staff- Offer Toileting every 2 Hours, in advance of need- Initiate/Maintain bed alarm- Obtain necessary fall risk management equipment:  - Apply yellow socks and bracelet for high fall risk patients- Consider  moving patient to room near nurses station  Outcome: Progressing  Goal: Maintain or return to baseline ADL function  Description: INTERVENTIONS:-  Assess patient's ability to carry out ADLs; assess patient's baseline for ADL function and identify physical deficits which impact ability to perform ADLs (bathing, care of mouth/teeth, toileting, grooming, dressing, etc.)- Assess/evaluate cause of self-care deficits - Assess range of motion- Assess patient's mobility; develop plan if impaired- Assess patient's need for assistive devices and provide as appropriate- Encourage maximum independence but intervene and supervise when necessary- Involve family in performance of ADLs- Assess for home care needs following discharge - Consider OT consult to assist with ADL evaluation and planning for discharge- Provide patient education as appropriate  Outcome: Progressing  Goal: Maintains/Returns to pre admission functional level  Description: INTERVENTIONS:- Perform AM-PAC 6 Click Basic Mobility/ Daily Activity assessment daily.- Set and communicate daily mobility goal to care team and patient/family/caregiver. - Collaborate with rehabilitation services on mobility goals if consulted- Perform Range of Motion 3 times a day.- Reposition patient every 2 hours.- Dangle patient 3 times a day- Stand patient 3 times a day- Ambulate patient 3 times a day- Out of bed to chair for meals  Outcome: Progressing     Problem: DISCHARGE PLANNING  Goal: Discharge to home or other facility with appropriate resources  Description: INTERVENTIONS:- Identify barriers to discharge w/patient and caregiver- Arrange for needed discharge resources and transportation as appropriate- Identify discharge learning needs (meds, wound care, etc.)- Arrange for interpretive services to assist at discharge as needed- Refer to Case Management Department for coordinating discharge planning if the patient needs post-hospital services based on physician/advanced  practitioner order or complex needs related to functional status, cognitive ability, or social support system  Outcome: Progressing     Problem: Prexisting or High Potential for Compromised Skin Integrity  Goal: Skin integrity is maintained or improved  Description: INTERVENTIONS:- Identify patients at risk for skin breakdown- Assess and monitor skin integrity- Assess and monitor nutrition and hydration status- Monitor labs - Assess for incontinence - Turn and reposition patient- Assist with mobility/ambulation- Relieve pressure over bony prominences- Avoid friction and shearing- Provide appropriate hygiene as needed including keeping skin clean and dry- Evaluate need for skin moisturizer/barrier cream- Collaborate with interdisciplinary team - Patient/family teaching- Consider wound care consult   Outcome: Progressing

## 2025-05-24 NOTE — ASSESSMENT & PLAN NOTE
Hemoglobin A1C was 6.6 on 2/22/25  Sees David Pagan [facility] in [place]  Home:  Janumet XR  qd  Here: Januvia 50mg qd  Continue DM diet  On QID Accuchecks with SSI Algo 1  BSs have continued to improve with steroid being tapered  = LD was 5/19 5/17 creat bumped up to 1.38 so Metformin was placed on hold  Creatinine is 0.97  Since creatinine was stable, restarted Metformin 1000 mg XR to start 5/22/25. (Last contrast study was 5/18/25).  I d/w pharmacy here and there is no contraindication to restarting his Metformin with his recent chemo and getting Leucovorin   Stopped the Lispro WM 5/21/25.    No changes today

## 2025-05-24 NOTE — ASSESSMENT & PLAN NOTE
Baseline creatinine 0.8 to 1.1.   Etiology suspected to be due to prerenal azotemia/possible early ATN  Peak SCr 2.77 on 4/22/25.   Renal function now at baseline  Will hold further iv fluids

## 2025-05-24 NOTE — ASSESSMENT & PLAN NOTE
Hgb dropped to 6.3 on 5/18 and was confirmed by repeat lab  at 6.7 so was transfused with one unit of PRBCs after discussion with Oncology  Spoke with wife over the phone and consent obtained  On 5/19/25, hemoglobin was up to 8.0 = appropriate response to transfusion  Hgb 8.7

## 2025-05-24 NOTE — PROGRESS NOTES
"Progress Note - Hospitalist   Name: Alexis Dennison 64 y.o. male I MRN: [id number]  Unit/Bed#: -01 I Date of Admission: 5/7/2025   Date of Service: 5/24/2025 I Hospital Day: 17    Assessment & Plan  Primary central nervous system (CNS) lymphoma  Patient with development of worsening confusion, and was seen in the ED on 3/24/2025.  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  Lymphoma/leukemia panel was nondiagnostic  CTH 4/3/25: concerning for worsening hydrocephalus.   Repeat LP 4/7/25 = undiagnostic again   MRI brain 4/11/25: \"Interval enlargement of the dominant left anterior basal ganglionic mass lesion with greater surrounding vasogenic edema and mass effect. 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  S/p EVD, self removed 4/26  S/p Keppra 500mg BID x 7 days for postoperative seizure ppx   Started on chemotherapy during hospital stay and is for weekly Rituximab and Methotrexate every 2 weeks  S/p Dexamethasone taper = LD was on 5/19/25  Maintain PICC line  Had Rituxan 5/10/25, 5/23/25  Did get Filgrastim 5/17 and 5/18  Was due for Methotrexate on 5/17 but + sepsis w/bacteremia.   ID cleared pt to get chemo and he did get the Methotrexate 5/21/25 and Decadron 10 mg IV x 1, Zofran 16mg IV  Renal maintaining bicarb drip and pt getting q6hr UAs for urine pH --> bicarb drip now stopped 5/23 and now switched to Isolyte @75ml/hr.    Get Leucovorin q6hrs x 6 doses   To " get Methotrexate levels until <0.10. 0.4 5/23/25  For daily CBC, CMP  Pt has an appt with Dr Phan on 6/3/25   Type 2 diabetes mellitus with hyperglycemia, without long-term current use of insulin (HCC)  Hemoglobin A1C was 6.6 on 2/22/25  Sees Endo [facility] in [place]  Home:  Janumet XR  qd  Here: Januvia 50mg qd  Continue DM diet  On QID Accuchecks with SSI Algo 1  BSs have continued to improve with steroid being tapered  = LD was 5/19 5/17 creat bumped up to 1.38 so Metformin was placed on hold  Creatinine is 0.97  Since creatinine was stable, restarted Metformin 1000 mg XR to start 5/22/25. (Last contrast study was 5/18/25).  I d/w pharmacy here and there is no contraindication to restarting his Metformin with his recent chemo and getting Leucovorin   Stopped the Lispro WM 5/21/25.    No changes today  Mixed hyperlipidemia  Continue atorvastatin  Did have slightly elevated LFT's on prior labs but most recently normal, consider holding statin if LFTs trend back up  Thyroid nodule  TSH/free T4 4/21/25 = 0.019/1.13  Repeat TSH/free T4 done 5/12 = TSH was 0.287 with free T4 0.85  TSH has normalized 5/21.  D/W [facility] -->  plan for OP US.  Pulmonary nodule  CT chest 3 months follow-up  Liver lesion  Patient will require outpatient follow-up  Encephalopathy  Continue Seroquel  S/p steroid taper- last dose was 5/19/25  On 1:1  LETY (acute kidney injury) (HCC)  Monitor status post methotrexate  Peak creat was 2.77 on 4/22/25  Previous baseline as OP 5/2023-2/22/25 was 1.1 to 1.0  CMP 5/9/25 with creatinine of 1.18 down from 1.23 on 5/8 and on 5/10 was 1.15  CMP on 5/12/25 showed creatinine stable at 1.1  5/17: creat up again to 1.38 = Being followed by Nephrology. Met sepsis criteria 5/17 and placed on IVF ordered as per sepsis protocol  5/19: creat trending down and was 1.20 -->  IVF was  reduced to 50ml/hr per renal.  On 5/20/25-5/21/25, they want to continue same IVF  Creatinine 5/23  "is 1.24  Cr now normal. Oncology would like to continue IV fluids for now.  Sepsis (HCC)  Patient noted to have hypotension and tachycardia on 5/17  Sepsis workup ordered and found to have positive blood cultures again, growing E coli as before  ID reconsulted  Started Cefepime 5/17/25 = await blood urine cx results but preliminary results of urine >100,000 GNR and prelim blood cx = GNR with blood cx ID panel positive for E.coli  Continue IVF until methotrexate level < 0.1  He has had stable BP, tachycardia has resolved and he has had no fevers  Updated plan from ID as below  Recurrent E. coli bacteremia  Patient completed 7 days of IV cefazolin which was finished on May 5  CT of A/P was done 5/18 per recommendation from ID looking for possible reason for recurrent bacteremia = was unrevealing  Was on Cetriaxone --> ID changed to Duricef to continue through 5/30/25 for a 14 days total of antibiotic tx  HTN (hypertension)  Home:  Losartan -25 qd  Here:  no meds   Stable BP today 5/23/25  Transaminitis  AST is 111, previously 114,  ALT is 322 previously 149  D/w H-O, they are aware =  \"Could be due to recent chemotherapy versus antibiotics versus liver lesions\".    CMP 5/9/25 = AST 57 (previously 111),  (previously 322)  Resolved  Leukopenia  resolved  Hyponatremia  resolved  Thrombocytopenia (HCC)  Per H-O  Platelets dropped to 66 on 5/18  On 5/19 was up to 79 --> 5/21 up to 144, 5/22 was 145 and today 153  For platelet transfusion if counts drop below 20K or in setting of any spontaneous bleeding   Anemia  Hgb dropped to 6.3 on 5/18 and was confirmed by repeat lab  at 6.7 so was transfused with one unit of PRBCs after discussion with Oncology  Spoke with wife over the phone and consent obtained  On 5/19/25, hemoglobin was up to 8.0 = appropriate response to transfusion  Hgb 8.7  Ankle edema, bilateral  Mild  Add TEDS    The above assessment and plan was reviewed and updated as determined by my " evaluation of the patient on 5/24/2025.    History of Present Illness   Patient seen and examined. Patients overnight issues or events were reviewed with nursing staff. New or overnight issues include the following:     Pt seen in his room. He denies any current complaints.    A 10 point review of systems was negative except for what is noted in the HPI.    Objective :  Temp:  [97.5 °F (36.4 °C)-98.6 °F (37 °C)] 97.5 °F (36.4 °C)  HR:  [71-83] 71  BP: (116-133)/(57-72) 133/72  Resp:  [17-18] 18  SpO2:  [95 %-96 %] 96 %  O2 Device: None (Room air)    Invasive Devices       Peripherally Inserted Central Catheter Line  Duration             PICC Line 05/01/25 Left Brachial 23 days                    Physical Exam  General Appearance: NAD; pleasant  HEENT: PERRLA, conjuctiva normal; mucous membranes moist; face symmetrical  Neck:  Supple  Lungs: clear bilaterally, normal respiratory effort, no retractions, expiratory effort normal, on room air  CV: regular rate and rhythm, no murmurs rubs or gallops noted   ABD: soft non tender, +BS x4  EXT: DP pulses intact, no lymphadenopathy, no edema  Skin: normal turgor, normal texture, no rash  Psych: affect normal, mood normal  Neuro: Awake and alert.  The above physical exam was reviewed and updated as determined by my evaluation of the patient on 5/24/2025.      Lab Results: I have reviewed the following results:  Results from last 7 days   Lab Units 05/24/25  0538 05/23/25  1113   WBC Thousand/uL 4.68 6.54   HEMOGLOBIN g/dL 8.7* 9.9*   HEMATOCRIT % 25.8* 30.1*   PLATELETS Thousands/uL 148* 153     Results from last 7 days   Lab Units 05/24/25  0538 05/23/25  1113   SODIUM mmol/L 139 139   POTASSIUM mmol/L 4.1 3.7   CHLORIDE mmol/L 103 98   CO2 mmol/L 30 34*   BUN mg/dL 19 19   CREATININE mg/dL 0.97 1.23   CALCIUM mg/dL 8.9 8.8             Results from last 7 days   Lab Units 05/24/25  1034 05/24/25  0724 05/23/25 2022   POC GLUCOSE mg/dl 138 94 163*       Imaging Results  Review: No pertinent imaging studies reviewed.  Other Study Results Review: Other studies reviewed include: blood work, blood sugars    Review of Scheduled Meds: Medications  reviewed and reconciled as needed  Current Facility-Administered Medications   Medication Dose Route Frequency Provider Last Rate    acetaminophen  650 mg Oral Q6H PRN Crispin Arana MD      allopurinol  300 mg Oral Daily Crispin Arana MD      alteplase  2 mg Intracatheter Q1MIN PRN Crispin Arana MD      alteplase  2 mg Intracatheter Q1MIN PRN Magali Lee MD      alteplase  2 mg Intracatheter Q1MIN PRN Satnam Kebede MD      alteplase  2 mg Intracatheter Q1MIN PRN Satnam Kebede MD      alteplase  2 mg Intracatheter Q1MIN PRN Satnam Kebede MD      aluminum-magnesium hydroxide-simethicone  30 mL Oral Q4H PRN Crispin Arana MD      atorvastatin  20 mg Oral Daily Crispin Arana MD      bisacodyl  10 mg Rectal Daily PRN Skyler Hendrickson MD      calcium carbonate  1,000 mg Oral Daily PRN Crispin Arana MD      cefadroxil  1,000 mg Oral Q12H [facility] Lisa Singh MD      chlorhexidine  15 mL Mouth/Throat Q12H [facility] Crispin Arana MD      docusate sodium  100 mg Oral BID Jessica Arroela,       heparin (porcine)  5,000 Units Subcutaneous Q8H [facility] Crispin Arana MD      insulin lispro  1-5 Units Subcutaneous TID AC PATI Porras      insulin lispro  1-5 Units Subcutaneous HS PATI Porras      lactulose  20 g Oral Daily PRN Joshua Kaminski DO      leucovorin  25 mg Oral Q6H Nicki Renteria MD      melatonin  6 mg Oral HS Crispin Arana MD      metFORMIN  1,000 mg Oral Daily PATI Porras      OLANZapine  5 mg Intramuscular BID PRN Crispin Arana MD      ondansetron  4 mg Intravenous Q6H PRN Crispin Arana MD      oxyCODONE  2.5 mg Oral Q4H PRN Crispin Arana MD      Or    oxyCODONE  5 mg Oral Q4H PRN Crispin Arana MD      pantoprazole  40 mg Oral Early Morning Crispin Arana MD      polyethylene glycol  17 g  Oral Daily PRN Skyler Hendrickson MD      QUEtiapine  12.5 mg Oral Daily Crispin Arana MD      QUEtiapine  50 mg Oral HS Crispin Arana MD      senna  2 tablet Oral Daily With Lunch Jessica Arreola DO      sitaGLIPtin  50 mg Oral Daily PATI Porras      sodium chloride  20 mL/hr Intravenous Once PRN Satnam Kebede MD      sodium chloride  20 mL/hr Intravenous Once PRN Satnam Kebede MD         VTE Pharmacologic Prophylaxis: Heparin  Code Status: Level 1 - Full Code  Current Length of Stay: 17 day(s)    Administrative Statements   I have spent a total time of 30 minutes in caring for this patient on the day of the visit/encounter including Diagnostic results, Prognosis, Risks and benefits of tx options, Instructions for management, Patient and family education, Importance of tx compliance, Risk factor reductions, Impressions, Counseling / Coordination of care, Documenting in the medical record, Reviewing/placing orders in the medical record (including tests, medications, and/or procedures), Obtaining or reviewing history  , and Communicating with other healthcare professionals .  ** Please Note:  voice to text software may have been used in the creation of this document. Although proof errors in transcription or interpretation are a potential of such software**

## 2025-05-24 NOTE — ASSESSMENT & PLAN NOTE
64 yoM with PMHx of DM2, NAFLD, and HT who presented with progressive encephalopathy found to have multiple scattered nodular cerebral and cerebellar enhancement who underwent two non-diagnostic LPs prompting stereotactic brain biopsy (4/14) which showed large B-cell lymphoma. See oncology progress note on 4/21 for full diagnostic work up. On 4/29, agitation led to infectious work up with showed UA+ and BCx with 2/2 E. Coli sensitive to ceftriaxone. ID was consulted is managing and has approved to restart chemo 5/2.      Treatment Plan: regimen modified from (https://pubmed.ncbi.nlm.nih.gov/41089269/)     C1 (4/17) 8 g/m²  C2 (5/2) 3 g/m², dose-reduced due to LETY, delayed x1 day due to E. coli bacteremia.  C3 (was planned for 5/17) delayed due to E. coli bacteremia, s/p MTX 6 g/m2 on 5/21.    Recommendations:  Urine pH remained above 7.0.  24-hour post administration methotrexate 5.0, patient started on IV leucovorin, methotrexate level 48 hours came back at 0.4, leucovorin is transition to oral, continue same and have extended to 6 additional dose.    Continue to monitor methotrexate level daily and urine pH until levels are less than 0.10.  Will also continue leucovorin rescue.  Patient had outpatient appointment with Dr. Phan on 5/23 that will be moved given delay in his treatment and discharge.  Status post Rituxan on 5/23/2025.  Mild transaminitis noted, no dose adjustment required for Rituxan.  Continue to monitor CBC and CMP daily until methotrexate levels cleared.

## 2025-05-24 NOTE — ASSESSMENT & PLAN NOTE
Patient noted to have hypotension and tachycardia on 5/17  Sepsis workup ordered and found to have positive blood cultures again, growing E coli as before  ID reconsulted  Started Cefepime 5/17/25 = await blood urine cx results but preliminary results of urine >100,000 GNR and prelim blood cx = GNR with blood cx ID panel positive for E.coli  Continue IVF until methotrexate level < 0.1  He has had stable BP, tachycardia has resolved and he has had no fevers  Updated plan from ID as below

## 2025-05-24 NOTE — ASSESSMENT & PLAN NOTE
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  > Started on high dose steroids and methotrexate every 2 weeks for 4 weeks (C1 received 4/18, C2 received 5/2) then maintenance every 4 weeks with Rituximab weekly for 8 weeks (1st dose Thursday 4/24 and second 5/3)  > Admitted to [facility] with ongoing cognitive deficits and delirium  > Completed Decadron taper on 5/19    - MTX + Rituximab restarted on 5/21 with bicarb drip, managed by Onc/Nephro  - MTX at  0.4 as of 1700 yesteday, new level at 1700 today. Can discharge once level in desired range. Anticipate this may be tomorrow and discussed with nursing to inform his wife Naldo if level is appropriate.  - Onc to set-up outpatient Rituximab infusions for after discharge  - Daily methotrexate levels  - Hem/Onc following  - NSGY following  - SLP for cognition

## 2025-05-24 NOTE — ASSESSMENT & PLAN NOTE
Mild pedal edema noted, no calf tenderness, likely in setting of IV fluids, however will need to continue IV fluids until methotrexate levels are less than 0.10.

## 2025-05-24 NOTE — ASSESSMENT & PLAN NOTE
Lab Results   Component Value Date    HGBA1C 6.6 (H) 02/22/2025       Recent Labs     05/23/25  1537 05/23/25 2022 05/24/25  0724 05/24/25  1034   POCGLU 150* 163* 94 138       Blood Sugar Average: Last 72 hrs:  (P) [id number]    - Metformin 1000mg daily restarted 5/22  - Lantus 10u daily with Lispro 8u TID and SSI  - Monitor accuchecks while on steroids  - Management per IM

## 2025-05-24 NOTE — ASSESSMENT & PLAN NOTE
Monitor status post methotrexate  Peak creat was 2.77 on 4/22/25  Previous baseline as OP 5/2023-2/22/25 was 1.1 to 1.0  CMP 5/9/25 with creatinine of 1.18 down from 1.23 on 5/8 and on 5/10 was 1.15  CMP on 5/12/25 showed creatinine stable at 1.1  5/17: creat up again to 1.38 = Being followed by Nephrology. Met sepsis criteria 5/17 and placed on IVF ordered as per sepsis protocol  5/19: creat trending down and was 1.20 -->  IVF was  reduced to 50ml/hr per renal.  On 5/20/25-5/21/25, they want to continue same IVF  Creatinine 5/23 is 1.24  Cr now normal. Oncology would like to continue IV fluids for now.

## 2025-05-25 LAB
ALBUMIN SERPL BCG-MCNC: 3.6 G/DL (ref 3.5–5)
ALP SERPL-CCNC: 76 U/L (ref 34–104)
ALT SERPL W P-5'-P-CCNC: 194 U/L (ref 7–52)
ANION GAP SERPL CALCULATED.3IONS-SCNC: 7 MMOL/L (ref 4–13)
AST SERPL W P-5'-P-CCNC: 90 U/L (ref 13–39)
BASOPHILS # BLD AUTO: 0.02 THOUSANDS/ÂΜL (ref 0–0.1)
BASOPHILS NFR BLD AUTO: 0 % (ref 0–1)
BILIRUB SERPL-MCNC: 0.61 MG/DL (ref 0.2–1)
BUN SERPL-MCNC: 23 MG/DL (ref 5–25)
CALCIUM SERPL-MCNC: 9.5 MG/DL (ref 8.4–10.2)
CHLORIDE SERPL-SCNC: 102 MMOL/L (ref 96–108)
CO2 SERPL-SCNC: 30 MMOL/L (ref 21–32)
CREAT SERPL-MCNC: 1.21 MG/DL (ref 0.6–1.3)
EOSINOPHIL # BLD AUTO: 0.06 THOUSAND/ÂΜL (ref 0–0.61)
EOSINOPHIL NFR BLD AUTO: 1 % (ref 0–6)
ERYTHROCYTE [DISTWIDTH] IN BLOOD BY AUTOMATED COUNT: 14.9 % (ref 11.6–15.1)
GFR SERPL CREATININE-BSD FRML MDRD: 62 ML/MIN/1.73SQ M
GLUCOSE P FAST SERPL-MCNC: 105 MG/DL (ref 65–99)
GLUCOSE SERPL-MCNC: 105 MG/DL (ref 65–140)
GLUCOSE SERPL-MCNC: 106 MG/DL (ref 65–140)
GLUCOSE SERPL-MCNC: 136 MG/DL (ref 65–140)
GLUCOSE SERPL-MCNC: 137 MG/DL (ref 65–140)
GLUCOSE SERPL-MCNC: 236 MG/DL (ref 65–140)
HCT VFR BLD AUTO: 29 % (ref 36.5–49.3)
HGB BLD-MCNC: 9.3 G/DL (ref 12–17)
IMM GRANULOCYTES # BLD AUTO: 0.08 THOUSAND/UL (ref 0–0.2)
IMM GRANULOCYTES NFR BLD AUTO: 2 % (ref 0–2)
LYMPHOCYTES # BLD AUTO: 2.09 THOUSANDS/ÂΜL (ref 0.6–4.47)
LYMPHOCYTES NFR BLD AUTO: 41 % (ref 14–44)
MCH RBC QN AUTO: 30 PG (ref 26.8–34.3)
MCHC RBC AUTO-ENTMCNC: 32.1 G/DL (ref 31.4–37.4)
MCV RBC AUTO: 94 FL (ref 82–98)
MONOCYTES # BLD AUTO: 0.07 THOUSAND/ÂΜL (ref 0.17–1.22)
MONOCYTES NFR BLD AUTO: 1 % (ref 4–12)
MTX SERPL-SCNC: 0.25 UMOL/L
NEUTROPHILS # BLD AUTO: 2.79 THOUSANDS/ÂΜL (ref 1.85–7.62)
NEUTS SEG NFR BLD AUTO: 55 % (ref 43–75)
NRBC BLD AUTO-RTO: 0 /100 WBCS
PLATELET # BLD AUTO: 177 THOUSANDS/UL (ref 149–390)
PMV BLD AUTO: 10.6 FL (ref 8.9–12.7)
POTASSIUM SERPL-SCNC: 3.8 MMOL/L (ref 3.5–5.3)
PROT SERPL-MCNC: 5.9 G/DL (ref 6.4–8.4)
RBC # BLD AUTO: 3.1 MILLION/UL (ref 3.88–5.62)
SODIUM SERPL-SCNC: 139 MMOL/L (ref 135–147)
WBC # BLD AUTO: 5.11 THOUSAND/UL (ref 4.31–10.16)

## 2025-05-25 PROCEDURE — 99232 SBSQ HOSP IP/OBS MODERATE 35: CPT | Performed by: PHYSICIAN ASSISTANT

## 2025-05-25 PROCEDURE — 99232 SBSQ HOSP IP/OBS MODERATE 35: CPT | Performed by: INTERNAL MEDICINE

## 2025-05-25 PROCEDURE — 99232 SBSQ HOSP IP/OBS MODERATE 35: CPT | Performed by: STUDENT IN AN ORGANIZED HEALTH CARE EDUCATION/TRAINING PROGRAM

## 2025-05-25 PROCEDURE — 82948 REAGENT STRIP/BLOOD GLUCOSE: CPT

## 2025-05-25 PROCEDURE — 97110 THERAPEUTIC EXERCISES: CPT

## 2025-05-25 PROCEDURE — 97116 GAIT TRAINING THERAPY: CPT

## 2025-05-25 PROCEDURE — 80204 DRUG ASSAY METHOTREXATE: CPT

## 2025-05-25 PROCEDURE — 97535 SELF CARE MNGMENT TRAINING: CPT

## 2025-05-25 PROCEDURE — 85025 COMPLETE CBC W/AUTO DIFF WBC: CPT | Performed by: STUDENT IN AN ORGANIZED HEALTH CARE EDUCATION/TRAINING PROGRAM

## 2025-05-25 PROCEDURE — 97530 THERAPEUTIC ACTIVITIES: CPT

## 2025-05-25 PROCEDURE — 80053 COMPREHEN METABOLIC PANEL: CPT | Performed by: STUDENT IN AN ORGANIZED HEALTH CARE EDUCATION/TRAINING PROGRAM

## 2025-05-25 RX ADMIN — ATORVASTATIN CALCIUM 20 MG: 20 TABLET, FILM COATED ORAL at 08:48

## 2025-05-25 RX ADMIN — HEPARIN SODIUM 5000 UNITS: 5000 INJECTION INTRAVENOUS; SUBCUTANEOUS at 13:23

## 2025-05-25 RX ADMIN — HEPARIN SODIUM 5000 UNITS: 5000 INJECTION INTRAVENOUS; SUBCUTANEOUS at 21:07

## 2025-05-25 RX ADMIN — INSULIN LISPRO 2 UNITS: 100 INJECTION, SOLUTION INTRAVENOUS; SUBCUTANEOUS at 11:59

## 2025-05-25 RX ADMIN — LEUCOVORIN CALCIUM 25 MG: 25 TABLET ORAL at 11:56

## 2025-05-25 RX ADMIN — Medication 6 MG: at 20:45

## 2025-05-25 RX ADMIN — QUETIAPINE 12.5 MG: 25 TABLET ORAL at 11:56

## 2025-05-25 RX ADMIN — QUETIAPINE 50 MG: 25 TABLET ORAL at 21:07

## 2025-05-25 RX ADMIN — HEPARIN SODIUM 5000 UNITS: 5000 INJECTION INTRAVENOUS; SUBCUTANEOUS at 05:33

## 2025-05-25 RX ADMIN — DOCUSATE SODIUM 100 MG: 100 CAPSULE, LIQUID FILLED ORAL at 08:48

## 2025-05-25 RX ADMIN — DOCUSATE SODIUM 100 MG: 100 CAPSULE, LIQUID FILLED ORAL at 17:38

## 2025-05-25 RX ADMIN — SODIUM CHLORIDE 50 ML/HR: 0.9 INJECTION, SOLUTION INTRAVENOUS at 13:24

## 2025-05-25 RX ADMIN — SENNOSIDES 17.2 MG: 8.6 TABLET, FILM COATED ORAL at 11:56

## 2025-05-25 RX ADMIN — LEUCOVORIN CALCIUM 25 MG: 25 TABLET ORAL at 05:32

## 2025-05-25 RX ADMIN — PANTOPRAZOLE SODIUM 40 MG: 40 TABLET, DELAYED RELEASE ORAL at 05:33

## 2025-05-25 RX ADMIN — CHLORHEXIDINE GLUCONATE 15 ML: 1.2 SOLUTION ORAL at 20:46

## 2025-05-25 RX ADMIN — METFORMIN ER 500 MG 1000 MG: 500 TABLET ORAL at 08:49

## 2025-05-25 RX ADMIN — CEFADROXIL 1000 MG: 500 CAPSULE ORAL at 08:49

## 2025-05-25 RX ADMIN — CEFADROXIL 1000 MG: 500 CAPSULE ORAL at 20:50

## 2025-05-25 RX ADMIN — ALLOPURINOL 300 MG: 300 TABLET ORAL at 08:48

## 2025-05-25 RX ADMIN — SITAGLIPTIN 50 MG: 50 TABLET, FILM COATED ORAL at 08:48

## 2025-05-25 NOTE — PLAN OF CARE
Problem: PAIN - ADULT  Goal: Verbalizes/displays adequate comfort level or baseline comfort level  Description: Interventions:- Encourage patient to monitor pain and request assistance- Assess pain using appropriate pain scale- Administer analgesics based on type and severity of pain and evaluate response- Implement non-pharmacological measures as appropriate and evaluate response- Consider cultural and social influences on pain and pain management- Notify physician/advanced practitioner if interventions unsuccessful or patient reports new pain  Outcome: Progressing     Problem: INFECTION - ADULT  Goal: Absence or prevention of progression during hospitalization  Description: INTERVENTIONS:- Assess and monitor for signs and symptoms of infection- Monitor lab/diagnostic results- Monitor all insertion sites, i.e. indwelling lines, tubes, and drains- Monitor endotracheal if appropriate and nasal secretions for changes in amount and color- Hurt appropriate cooling/warming therapies per order- Administer medications as ordered- Instruct and encourage patient and family to use good hand hygiene technique- Identify and instruct in appropriate isolation precautions for identified infection/condition  Outcome: Progressing     Problem: SAFETY ADULT  Goal: Patient will remain free of falls  Description: INTERVENTIONS:- Educate patient/family on patient safety including physical limitations- Instruct patient to call for assistance with activity - Consult OT/PT to assist with strengthening/mobility - Keep Call bell within reach- Keep bed low and locked with side rails adjusted as appropriate- Keep care items and personal belongings within reach- Initiate and maintain comfort rounds- Make Fall Risk Sign visible to staff- Offer Toileting every 2 Hours, in advance of need- Initiate/Maintain bed alarm- Obtain necessary fall risk management equipment:  - Apply yellow socks and bracelet for high fall risk patients- Consider  moving patient to room near nurses station  Outcome: Progressing  Goal: Maintain or return to baseline ADL function  Description: INTERVENTIONS:-  Assess patient's ability to carry out ADLs; assess patient's baseline for ADL function and identify physical deficits which impact ability to perform ADLs (bathing, care of mouth/teeth, toileting, grooming, dressing, etc.)- Assess/evaluate cause of self-care deficits - Assess range of motion- Assess patient's mobility; develop plan if impaired- Assess patient's need for assistive devices and provide as appropriate- Encourage maximum independence but intervene and supervise when necessary- Involve family in performance of ADLs- Assess for home care needs following discharge - Consider OT consult to assist with ADL evaluation and planning for discharge- Provide patient education as appropriate  Outcome: Progressing  Goal: Maintains/Returns to pre admission functional level  Description: INTERVENTIONS:- Perform AM-PAC 6 Click Basic Mobility/ Daily Activity assessment daily.- Set and communicate daily mobility goal to care team and patient/family/caregiver. - Collaborate with rehabilitation services on mobility goals if consulted- Perform Range of Motion 3 times a day.- Reposition patient every 2 hours.- Dangle patient 3 times a day- Stand patient 3 times a day- Ambulate patient 3 times a day- Out of bed to chair for meals  Outcome: Progressing     Problem: DISCHARGE PLANNING  Goal: Discharge to home or other facility with appropriate resources  Description: INTERVENTIONS:- Identify barriers to discharge w/patient and caregiver- Arrange for needed discharge resources and transportation as appropriate- Identify discharge learning needs (meds, wound care, etc.)- Arrange for interpretive services to assist at discharge as needed- Refer to Case Management Department for coordinating discharge planning if the patient needs post-hospital services based on physician/advanced  practitioner order or complex needs related to functional status, cognitive ability, or social support system  Outcome: Progressing     Problem: Prexisting or High Potential for Compromised Skin Integrity  Goal: Skin integrity is maintained or improved  Description: INTERVENTIONS:- Identify patients at risk for skin breakdown- Assess and monitor skin integrity- Assess and monitor nutrition and hydration status- Monitor labs - Assess for incontinence - Turn and reposition patient- Assist with mobility/ambulation- Relieve pressure over bony prominences- Avoid friction and shearing- Provide appropriate hygiene as needed including keeping skin clean and dry- Evaluate need for skin moisturizer/barrier cream- Collaborate with interdisciplinary team - Patient/family teaching- Consider wound care consult   Outcome: Progressing

## 2025-05-25 NOTE — PROGRESS NOTES
05/25/25 1400   Pain Assessment   Pain Assessment Tool 0-10   Pain Score No Pain   Restrictions/Precautions   Precautions 1:1;Bed/chair alarms;Cognitive;Fall Risk;Limb alert;Supervision on toilet/commode;Multiple lines   Weight Bearing Restrictions No   ROM Restrictions No   Cognition   Overall Cognitive Status Impaired   Arousal/Participation Alert;Cooperative   Attention Attends with cues to redirect   Orientation Level Oriented to person;Disoriented to place;Disoriented to time;Disoriented to situation   Memory Decreased long term memory;Decreased recall of biographical information;Decreased recall of recent events;Decreased short term memory;Decreased recall of precautions   Following Commands Follows one step commands with increased time or repetition   Subjective   Subjective Pt agreeable to PT, reports being tired   Lying to Sitting on Side of Bed   Type of Assistance Needed Supervision   Physical Assistance Level No physical assistance   Lying to Sitting on Side of Bed CARE Score 4   Sit to Stand   Type of Assistance Needed Supervision   Physical Assistance Level No physical assistance   Comment no AD   Sit to Stand CARE Score 4   Bed-Chair Transfer   Type of Assistance Needed Supervision   Physical Assistance Level No physical assistance   Comment no AD   Chair/Bed-to-Chair Transfer CARE Score 4   Transfer Bed/Chair/Wheelchair   Limitations Noted In Balance;Problem Solving   Walk 10 Feet   Type of Assistance Needed Supervision   Physical Assistance Level No physical assistance   Comment no AD   Walk 10 Feet CARE Score 4   Walk 50 Feet with Two Turns   Type of Assistance Needed Supervision   Physical Assistance Level No physical assistance   Comment no AD   Walk 50 Feet with Two Turns CARE Score 4   Walk 150 Feet   Type of Assistance Needed Supervision   Physical Assistance Level No physical assistance   Comment no AD   Walk 150 Feet CARE Score 4   Ambulation   Primary Mode of Locomotion Prior to  Admission Walk   Distance Walked (feet) 250 ft  (x4)   Assist Device   (no AD)   Gait Pattern Slow Vicki;Decreased foot clearance   Limitations Noted In Balance   Provided Assistance with: Balance;Direction   Walk Assist Level Supervision   Findings minimized arm swing, decreased foot clearance deficit on L more than R, 1 standing rest break in last trial   Does the patient walk? 2. Yes   Curb or Single Stair   Style negotiated Single stair   Type of Assistance Needed Supervision   Physical Assistance Level No physical assistance   Comment RHR   1 Step (Curb) CARE Score 4   4 Steps   Type of Assistance Needed Supervision   Physical Assistance Level No physical assistance   Comment RHR   4 Steps CARE Score 4   12 Steps   Type of Assistance Needed Supervision   Physical Assistance Level No physical assistance   Comment RHR   12 Steps CARE Score 4   Stairs   Type Stairs   # of Steps 12   Assist Devices Single Rail   Toilet Transfer   Type of Assistance Needed Supervision   Physical Assistance Level No physical assistance   Comment no AD   Toilet Transfer CARE Score 4   Toilet Transfer   Surface Assessed Standard Toilet   Adaptive Equipment Grab Bar   Positioning Concerns Safety;Cognition   Therapeutic Interventions   Strengthening seated BLE 3 x 10 LAQ, hip flexion   Equipment Use   NuStep LE only x 8 min L 3   Other Comments   Comments Pt incontinent of urine, noted leakage through brief and onto floor in hallway. RN notified house keeping, 1:1 present to assist in management of clothing, replaced with clean brief, undergarments and pants including new KIMBERLYN stockings   Assessment   Treatment Assessment Alexis seen for 90 min skilled PT session. Session focused on gait training increasing ambulation distances to improve quality of endurance to improve community distancing, pt did have episode of urinary incontinence, chemo precautions maintained. Pt's edgard Johnson present and observed part of session. Continue to focus on  improving quality and distance of gait to maximize independence and decrease risk of falls. Pt remains in care of 1:1 post session.   Family/Caregiver Present edgard Johnson present and observed session   Problem List Decreased strength;Decreased endurance;Impaired balance;Decreased mobility;Impaired judgement;Decreased cognition;Decreased safety awareness;Impaired vision   PT Barriers   Functional Limitation Car transfers;Ramp negotiation;Stair negotiation;Standing;Transfers;Walking   Plan   Treatment/Interventions ADL retraining;Functional transfer training;LE strengthening/ROM;Elevations;Therapeutic exercise;Endurance training;Bed mobility;Gait training;Equipment eval/education;Patient/family training   Progress Progressing toward goals   PT Therapy Minutes   PT Time In 1400   PT Time Out 1530   PT Total Time (minutes) 90   PT Mode of treatment - Individual (minutes) 90   PT Mode of treatment - Concurrent (minutes) 0   PT Mode of treatment - Group (minutes) 0   PT Mode of treatment - Co-treat (minutes) 0   PT Mode of Treatment - Total time(minutes) 90 minutes   PT Cumulative Minutes 1131   Therapy Time missed   Time missed? No

## 2025-05-25 NOTE — PROGRESS NOTES
Progress Note - Oncology-Medical   Name: Alexis Dennison 64 y.o. male I MRN: [id number]  Unit/Bed#: -01 I Date of Admission: 5/7/2025   Date of Service: 5/25/2025 I Hospital Day: 18     Assessment & Plan  Primary central nervous system (CNS) lymphoma  Patient's wife is in the room.    64 yoM with PMHx of DM2, NAFLD, and HT who presented with progressive encephalopathy found to have multiple scattered nodular cerebral and cerebellar enhancement who underwent two non-diagnostic LPs prompting stereotactic brain biopsy (4/14) which showed large B-cell lymphoma.  Patient received third cycle of high-dose methotrexate on 5/21/2025.  He is receiving leucovorin.  Methotrexate level from yesterday is pending and that we will determine the discharge.  Patient received Rituxan on 5/23/2025.  He seems to be doing well.  Not complaining of headaches and seizures.  No nausea and vomiting.  No cardiac and pulm symptoms at rest.  Appetite is low.  Constipation.    Recommendations:  Urine pH remained above 7.0.  24-hour post administration methotrexate 5.0, patient started on IV leucovorin, methotrexate level 48 hours came back at 0.4, leucovorin is transition to oral, continue same and have extended to 6 additional dose.    Continue to monitor methotrexate level daily and urine pH until levels are less than 0.10.  Will also continue leucovorin rescue.  Patient had outpatient appointment with Dr. Pahn on 5/23 that will be moved given delay in his treatment and discharge.  Status post Rituxan on 5/23/2025.  Mild transaminitis noted, no dose adjustment required for Rituxan.  Continue to monitor CBC and CMP daily until methotrexate levels cleared.  Thyroid nodule  Patient incidentally found to have thyroid nodule, was recommended ultrasound thyroid.  Pulmonary nodule  CTA from 3/29/2025 with nonspecific 4 mm right lower lobe pulmonary nodule, recommended 3 months follow-up.  Transaminitis  Likely secondary to  methotrexate.  Thrombocytopenia (HCC)  Counts are being monitored.  Anemia  Likely multifactorial.  Hemoglobin is stable at 9.3  Will continue to monitor.  Ankle edema, bilateral  Mild pedal edema noted, no calf tenderness, likely in setting of IV fluids, however will need to continue IV fluids depending upon methotrexate level  HPI and review of systems:  See above.  No headache, seizures, diplopia, dysphagia, hoarseness, chest pain, shortness of breath, cough or hemoptysis, melena or hematuria.  No fevers, chills, bleeding, bone pains, skin rash, night sweats and no swollen glands.  Has tiredness, weakness, mild edema of the ankles, constipation and decreased appetite.  Physical examination:  Alert and oriented and not in distress.  Noted vital signs.  No icterus.  No oral thrush.  No palpable neck mass.  Clear lung for's.  Regular heart rate.  Soft and nontender abdomen.  No palpable abdominal mass.  No ascites.  Trace edema the feet.  No calf tenderness.  No skin rash.  Patient can move all 4 extremities.  Test results:    CBC and differential  Status: Final result      Contains abnormal data CBC and differential  Order: [id number]   Status: Final result       Next appt: 06/03/2025 at 08:40 AM in Hematology and Oncology (Leta Phan MD)    Test Result Released: Yes (seen)    0 Result Notes            Component  Ref Range & Units (hover) 5/25/25  5:44 AM 5/24/25  5:38 AM 5/23/25 11:13 AM 5/22/25  6:13 AM 5/22/25  6:13 AM 5/21/25 10:33 AM 5/21/25 10:33 AM   WBC 5.11 4.68 6.54  9.85  9.02   RBC 3.10 Low  2.81 Low  3.20 Low   2.87 Low   3.38 Low    Hemoglobin 9.3 Low  8.7 Low  9.9 Low   8.9 Low   10.3 Low    Hematocrit 29.0 Low  25.8 Low  30.1 Low   25.7 Low   32.0 Low    MCV 94 92 94  90  95   MCH 30.0 31.0 30.9  31.0  30.5   MCHC 32.1 33.7 32.9  34.6  32.2   RDW 14.9 15.1 15.4 High   14.6  15.9 High    MPV 10.6 10.2 10.5  10.6  10.5   Platelets 177 148 Low  153  145 Low   144 Low    nRBC 0 0         Segmented % 55 63  65  42 Low     Immature Grans % 2 2        Lymphocytes % 41 31  16  36    Monocytes % 1 Low  4  5  9    Eosinophils Relative 1 0  0  0    Basophils Relative 0 0  0  0    Absolute Neutrophils 2.79 2.92  7.39 R  4.06 R    Absolute Immature Grans 0.08 0.08        Absolute Lymphocytes 2.09 1.47  1.77 R  3.79 R    Absolute Monocytes 0.07 Low  0.18  0.49 R  0.81 R    Eosinophils Absolute 0.06 0.02  0.00 R  0.00 R    Basophils Absolute 0.02 0.01  0.00 R  0.00 R    Bands %    10 High  R  3 R    Microcytes    Present      Polychromasia    Present      Myelocytes %      2 High  R    Absolute Myelocytes      0.18 High  R    Pathology Review      Yes Abnormal  R    Differential Comment      see note CM    Macrocytes      Present    Metamyelocytes %    2 High  R  2 High  R    Atypical Lymphocytes %    2 High  R  6 High  R    Absolute Metamyelocytes    0.20 High  R  0.18 High  R    Total Counted          nRBC    2 R      RBC Morphology    Present  Present    Platelet Estimate    Adequate R  Adequate R    Anisocytosis    Present  Present              Specimen Collected: 05/25/25  5:44 AM   Comprehensive metabolic panel  Status: Final result      Contains abnormal data Comprehensive metabolic panel  Order: [id number]   Status: Final result       Next appt: 06/03/2025 at 08:40 AM in Hematology and Oncology (Leta Phan MD)    Test Result Released: Yes (seen)    0 Result Notes       1 HM Topic            Component  Ref Range & Units (hover) 5/25/25  5:44 AM 5/24/25  5:38 AM 5/23/25 11:13 AM 5/23/25  5:29 AM 5/22/25  6:13 AM 5/21/25 10:33 AM 5/20/25  5:41 AM   Sodium 139 139 139 142 138 142 141   Potassium 3.8 4.1 3.7 3.8 4.0 3.8 3.5   Chloride 102 103 98 99 100 105 106   CO2 30 30 34 High  36 High  33 High  27 29   ANION GAP 7 6 7 7 5 10 6   BUN 23 19 19 20 24 19 19   Creatinine 1.21 0.97 CM 1.23 CM 1.24 CM 1.26 CM 1.28 CM 1.20 CM   Comment: Standardized to IDMS reference method   Glucose 105 107   High    High   High  CM 95 CM   Comment: If the patient is fasting, the ADA then defines impaired fasting glucose as > 100 mg/dL and diabetes as > or equal to 123 mg/dL.   Glucose, Fasting 105 High    114 High  187 High   95   Calcium 9.5 8.9 8.8 8.5 8.4 9.1 8.3 Low    AST 90 High  47 High  89 High  100 High  16 24     High  123 High   High   High  CM 32 CM 40 CM    Comment: Specimen collection should occur prior to Sulfasalazine administration due to the potential for falsely depressed results.   Alkaline Phosphatase 76 54 64 63 58 70    Total Protein 5.9 Low  5.4 Low  5.9 Low  5.5 Low  5.3 Low  6.1 Low     Albumin 3.6 3.2 Low  3.5 3.2 Low  3.2 Low  3.5    Total Bilirubin 0.61 0.57 CM 0.46 CM 0.49 CM 0.74 CM 0.49 CM    Comment: Use of this assay is not recommended for patients undergoing treatment with eltrombopag due to the potential for falsely elevated results.  N-acetyl-p-benzoquinone imine (metabolite of Acetaminophen) will generate erroneously low results in samples for patients that have taken an overdose of Acetaminophen.   eGFR 62 81 61 60 59 58 63              Component  Ref Range & Units (hover) 5/23/25  5:48 PM 5/22/25  4:28 PM 5/8/25  6:14 AM 5/8/25 12:32 AM 5/7/25  4:06 PM 5/6/25  3:56 PM 5/5/25 12:34 PM   Methotrexate Lvl 0.40 5.10 <0.10 <0.10 <0.10 0.13 0.36             All the above discussed with patient and his wife in detail.  Questions answered.  Narrative

## 2025-05-25 NOTE — ASSESSMENT & PLAN NOTE
Mild pedal edema noted, no calf tenderness, likely in setting of IV fluids, however will need to continue IV fluids depending upon methotrexate level

## 2025-05-25 NOTE — ASSESSMENT & PLAN NOTE
Lab Results   Component Value Date    HGBA1C 6.6 (H) 02/22/2025       Recent Labs     05/24/25  1034 05/24/25  1536 05/24/25 2054 05/25/25  0618   POCGLU 138 105 131 106       Blood Sugar Average: Last 72 hrs:  (P) [id number]    - Metformin 1000mg daily restarted 5/22  - Lantus 10u daily with Lispro 8u TID and SSI  - Monitor accuchecks while on steroids  - Management per IM

## 2025-05-25 NOTE — ASSESSMENT & PLAN NOTE
5/19 - hemoglobin of 8.0 - improved from 6.7 after transfusion of 1u pRBC over the weekend  5/23- Hgb 9.9  -- improved from 8.9

## 2025-05-25 NOTE — ASSESSMENT & PLAN NOTE
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  > Started on high dose steroids and methotrexate every 2 weeks for 4 weeks (C1 received 4/18, C2 received 5/2) then maintenance every 4 weeks with Rituximab weekly for 8 weeks (1st dose Thursday 4/24 and second 5/3)  > Admitted to [facility] with ongoing cognitive deficits and delirium  > Completed Decadron taper on 5/19    - MTX + Rituximab restarted on 5/21 with bicarb drip, managed by Onc/Nephro  - MTX at  0.4 as of 1700 yesteday, new level at 1700 today. Can discharge once level in desired range. Anticipate this may be tomorrow and discussed with nursing to inform his wife Naldo if level is appropriate. Waiting for clearance from Heme/Onc for discharge  - Onc to set-up outpatient Rituximab infusions for after discharge  - Daily methotrexate levels  - Hem/Onc following  - NSGY following  - SLP for cognition

## 2025-05-25 NOTE — PROGRESS NOTES
Progress Note - PMR   Name: Alexis Dennison 64 y.o. 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 Can discharge once level in desired range. Anticipate this may be tomorrow and discussed with nursing to inform his wife Naldo if level is appropriate. Waiting for clearance from Heme/Onc for discharge  - Onc to set-up outpatient Rituximab infusions for after discharge  - Daily methotrexate levels  - Hem/Onc following  - NSGY following  - SLP for cognition  Type 2 diabetes mellitus with hyperglycemia, without long-term current use of insulin ([facility])  Lab Results   Component Value Date    HGBA1C 6.6 (H) 02/22/2025       Recent Labs     05/24/25  1034 05/24/25  1536 05/24/25  2054 05/25/25  0618   POCGLU 138 105 131 106       Blood Sugar Average: Last 72 hrs:  (P) [id number]    - Metformin 1000mg daily restarted 5/22  - Lantus 10u daily with Lispro 8u TID and SSI  - Monitor  accuchecks while on steroids  - Management per IM  HTN, goal below 130/80  > Blood pressure controlled off antihypertensives    - Monitor VS  - Management per IM  Mixed hyperlipidemia  - Continue atorvastatin 10 mg daily  Thyroid nodule  Outpatient endocrine follow-up needed  Pulmonary nodule  > CTA 3/29: nonspecific 4 mm RLL pulmonary nodule   - follow up outpatient for repeat imaging in 3 months  Liver lesion  > CTA 3/29- nonspecific 12mm hypodense right hepatic lesion, recommended MRI abdomen  > MRI abdomen 3/30- simple right hepatic lobe cyst    - Follow up outpatient for monitoring  Ambulatory dysfunction  - Fall precautions  - PT/OT  Encephalopathy  > Patient has been pleasantly confused for months in the setting of brain mass.  > acutely worsened due to UTI and bacteremia. S/p 7 day course of antibx  > Continues to lack insight to his current situation and is severely cognitively impaired.  He does not get agitated for long periods of time and is mostly confused and impulsive.  > A&O 1 on [facility] admission    - Continue seroquel 12.5mg at noon with 50mg HS  - PRN Zyprexa 2.5mg IM as needed for agitation  -Overstimulation precautions, frequent re-orientation, re-direction, re-assurance  -Sleep log and agitation monitoring if needed  -Optimize sleep-wake cycle  -Limit sedating medications when possible  - Ensure optimal management electrolytes, nutrition, and hydration  - Ensure optimal bowel/bladder management  - Ensure optimal pain management   -Patient/family/caregiver education and training   -Continue 1:1 due to overall safety, impulsivity, lack of safety awareness and poor insight.  -Fall precautions with frequent rounding; proactive toileting program, patient should not be unattended in bathroom      LETY (acute kidney injury) (HCC)  > Baseline Cr 0.9  > Recently 1.3 improved from before- peak 2.7  > Now s/p bicarb    - IVF per IM/Nephro  - Management at discretion of nephro   E. coli UTI  > Pet met sepsis  criteria on 4/29 with confusion and agitation. UA was positive. Urine culture and blood cultures showed Ecoli sensitive to cephalosporins.  > ID consulted and pt completed 7d course of ceftriaxone  > Approved to restart chemo 5/2.  > Chemo on hold for GNR UTI and bacteremia > restarted chemo on 5/21    - Monitor while on antibiotics  Impaired mobility and activities of daily living  Patient was evaluated by the rehabilitation team MD and an appropriate candidate for acute inpatient rehabilitation program at this time.  The patient will tolerate 3 hours/day 5 to 7 days/week of intensive physical, occupational in order to obtain goals for community discharge  Due to the patient's functional Compared to their baseline level of function in addition to their ongoing medical needs, the patient would benefit from daily supervision from a rehabilitation physician as well as rehabilitation nursing to implement and adjust the medical as well as functional plan of care in order to meet the patient's goals.  HTN (hypertension)  Had been on losartan but holding at this time- his blood pressures are good  Transaminitis  CMP checked on 5/9 and AST is down to 57 from 111 and ALT to 281 from 322  5/19 labs: AST 11 and ALT 25.  5/23- AST 89 and  - monitor routinely while on MTX  Hyponatremia  Sodium stable at 140 on 5/19 labs  5/23- Na 139 and stalbe  Thrombocytopenia (HCC)  5/19- Platelets down to 79K- will need to continue monitoring with chemotherapy now on hold  5/23- Plts improved to 153K from 145K  Sepsis (HCC)  5/19- Sepsis alert over the weekend.  CT A/P showed perinephric stranding. UA abnormal and Ucx grew GNR in urine. Blood cultures also + for GNR. Lactate 4.4>5.1. procal 7.39> 6.55. Started on IV antibiotics and Chemo on hold per Onc  5/22- Cleared by ID to restart chemo. Transitioned to PO antibiotics  - Continue antibiotics  - Monitor WBC on routine labs  - Follow temp curve  Recurrent E. coli bacteremia  5/20-  Antibx changed from cefepime to ceftriaxone per ID recommendations. Need to follow up final susceptibilities and adjust antibiotics accordingly  Now on oral Duricef, no longer on IV medications  Leukopenia  5/19- WBC improving. Now 3.93 from 1.84 off chemo  5/23- WBC stable at 6.54  Anemia  5/19 - hemoglobin of 8.0 - improved from 6.7 after transfusion of 1u pRBC over the weekend  5/23- Hgb 9.9  -- improved from 8.9  At risk for acid-base imbalance  Urine pH testing and sodium bicarb drip with MTX treatments > restarted now on bicarb 100 ml/hr  Electrolyte imbalance risk    SIRS (systemic inflammatory response syndrome) (HCC)    Ankle edema, bilateral      Subjective   Patient is a 64-year-old male with history of hyperlipidemia hypertension, GERD, type 2 diabetes, nonalcoholic fatty liver disease and sleep apnea presenting on 3/29 for increased confusion after recently being diagnosed with a brain mass underwent workup with consistent findings of a CNS lymphoma noted treated with methotrexate and Rituxan and course complicated LETY, delirium and agitation as well as an E. coli UTI and bacteremia     Chief Complaint: f/u non-traumatic brain injury and f/u ambulatory dysfunction    Interval: Patient seen and evaluated in room.  Overall has been doing well and waiting for clearance for discharge.  I did reach out to hematology/oncology and we are still waiting for the methotrexate level to return.  It was drawn yesterday at 1500 and looking to determine what the qualifications for stability from their perspective would allow us to discharge the patient home.  I do anticipate that should be soon in the next 24 hours or so however we will discuss with all parties involved and then with the patient's family in order to allow for a safe community discharge with proper notification timing and planning for the family sake.  Reviewed labs today which included CBC and a CMP.  On the CBC the hemoglobin has improved and the  platelets are now within normal limits after being lower previously.  On the metabolic panel the liver enzymes are slightly up but the remaining labs are within normal limits.  Spoke with wife and updated today.    Objective :  Temp:  [97.6 °F (36.4 °C)-98 °F (36.7 °C)] 97.6 °F (36.4 °C)  HR:  [80-88] 88  BP: (100-115)/(67-75) 114/73  Resp:  [13-18] 17  SpO2:  [96 %-98 %] 96 %  O2 Device: None (Room air)    Functional Update:  Physical Therapy Occupational Therapy Speech Therapy   Weight Bearing Status: Full Weight Bearing  Transfers: Supervision  Bed Mobility: Supervision  Amulation Distance (ft): 250 feet  Ambulation: Supervision  Assistive Device for Ambulation: Roller Walker (and with no AD)  Number of Stairs: 12  Assistive Device for Stairs: Right Hand Rail  Stair Assistance: Supervision  Ramp: Incidental Touching  Assistive Device for Ramp: None  Discharge Recommendations: Home with:  DC Home with:: 24 Hour Supervision   Eating:  (setup)  Grooming: Supervision  Bathing: Supervision, Incidental Touching  Bathing: Supervision, Incidental Touching  Upper Body Dressing: Supervision  Lower Body Dressing: Supervision  Toileting: Supervision, Incidental Touching  Tub/Shower Transfer: Incidental Touching  Toilet Transfer: Incidental Touching  Cognition: Exceptions to WNL  Cognition: Decreased Memory, Decreased Comprehension, Decreased Safety, Decreased Executive Functions, Decreased Attention  Orientation: Person, Place   Mode of Communication: Verbal  Speech/Language:  (word finding difficulty)  Cognition: Exceptions to WNL  Cognition: Decreased Memory, Decreased Executive Functions, Decreased Attention, Decreased Comprehension, Decreased Safety  Orientation: Person  Discharge Recommendations: Home with:  DC Home with:: 24 Hour Supervision, 24 Hour Assisteance, Family Support, Home Speech Therapy, Outpatient Speech Therapy     Physical Exam  Vitals reviewed.   Constitutional:       Appearance: Normal appearance.    HENT:      Head: Normocephalic.      Comments: Biopsy site healing well     Right Ear: External ear normal.      Left Ear: External ear normal.      Nose: Nose normal. No rhinorrhea.      Mouth/Throat:      Mouth: Mucous membranes are moist.      Pharynx: Oropharynx is clear.     Eyes:      General: No scleral icterus.      Cardiovascular:      Rate and Rhythm: Normal rate.   Pulmonary:      Effort: Pulmonary effort is normal. No respiratory distress.   Abdominal:      General: There is no distension.      Palpations: Abdomen is soft.     Musculoskeletal:      Right lower leg: No edema.      Left lower leg: No edema.     Skin:     General: Skin is warm and dry.      Coloration: Skin is pale.     Neurological:      Mental Status: He is alert.      Motor: No weakness.      Comments: Oriented to self with limited safety awareness and insight   Psychiatric:         Mood and Affect: Mood normal.         Behavior: Behavior normal.             Scheduled Meds:  Current Facility-Administered Medications   Medication Dose Route Frequency Provider Last Rate    acetaminophen  650 mg Oral Q6H PRN Cripsin Arana MD      allopurinol  300 mg Oral Daily Crispin Arana MD      alteplase  2 mg Intracatheter Q1MIN PRN Crispin Arana MD      alteplase  2 mg Intracatheter Q1MIN PRN Magali Lee MD      alteplase  2 mg Intracatheter Q1MIN PRN Satnam Kebede MD      alteplase  2 mg Intracatheter Q1MIN PRN Satnam Kebede MD      alteplase  2 mg Intracatheter Q1MIN PRN Satnam Kebede MD      aluminum-magnesium hydroxide-simethicone  30 mL Oral Q4H PRN Crispin Arana MD      atorvastatin  20 mg Oral Daily Crispin Arana MD      bisacodyl  10 mg Rectal Daily PRN Skyler Hendrickson MD      calcium carbonate  1,000 mg Oral Daily PRN Crispin Arana MD      cefadroxil  1,000 mg Oral Q12H CAIT Lisa Singh MD      chlorhexidine  15 mL Mouth/Throat Q12H [facility] Crispin Arana MD      docusate sodium  100 mg Oral BID Jessica Arreola DO      heparin  (porcine)  5,000 Units Subcutaneous Q8H [facility] Crispin Arana MD      insulin lispro  1-5 Units Subcutaneous TID AC PATI Porras      insulin lispro  1-5 Units Subcutaneous HS PATI Porras      lactulose  20 g Oral Daily PRN Joshua Kaminski,       leucovorin  25 mg Oral Q6H Nicki Renteria MD      melatonin  6 mg Oral HS Crispin Arana MD      metFORMIN  1,000 mg Oral Daily PATI Porras      OLANZapine  5 mg Intramuscular BID PRN Crispin Arana MD      ondansetron  4 mg Intravenous Q6H PRN Crispin Arana MD      oxyCODONE  2.5 mg Oral Q4H PRN Crispin Arana MD      Or    oxyCODONE  5 mg Oral Q4H PRN Crispin Arana MD      pantoprazole  40 mg Oral Early Morning Crispin Arana MD      polyethylene glycol  17 g Oral Daily PRN Skyler Hendrickson MD      QUEtiapine  12.5 mg Oral Daily Crispin Arana MD      QUEtiapine  50 mg Oral HS Crispin Arana MD      senna  2 tablet Oral Daily With Lunch Jessica Arreola, DO      sitaGLIPtin  50 mg Oral Daily PATI Porras      sodium chloride  20 mL/hr Intravenous Once PRN Satnam Kebede MD      sodium chloride  20 mL/hr Intravenous Once PRN Satnam Kebede MD      sodium chloride  50 mL/hr Intravenous Continuous Leta Adan PA-C 50 mL/hr (05/25/25 0537)         Lab Results: I have reviewed the following results:  Results from last 7 days   Lab Units 05/25/25  0544 05/24/25  0538 05/23/25  1113   HEMOGLOBIN g/dL 9.3* 8.7* 9.9*   HEMATOCRIT % 29.0* 25.8* 30.1*   WBC Thousand/uL 5.11 4.68 6.54   PLATELETS Thousands/uL 177 148* 153     Results from last 7 days   Lab Units 05/25/25  0544 05/24/25  0538 05/23/25  1113   BUN mg/dL 23 19 19   SODIUM mmol/L 139 139 139   POTASSIUM mmol/L 3.8 4.1 3.7   CHLORIDE mmol/L 102 103 98   CREATININE mg/dL 1.21 0.97 1.23   AST U/L 90* 47* 89*   ALT U/L 194* 123* 158*                Joshua Kaminski, DO  Physical Medicine and Rehabilitation  [facility]    I have spent a  total time of 35 minutes in caring for this patient on the day of the visit/encounter including Counseling / Coordination of care, Documenting in the medical record, Reviewing/placing orders in the medical record (including tests, medications, and/or procedures), and Communicating with other healthcare professionals .

## 2025-05-25 NOTE — PROGRESS NOTES
"Progress Note - Hospitalist   Name: Alexis Dennison 64 y.o. male I MRN: [id number]  Unit/Bed#: -01 I Date of Admission: 5/7/2025   Date of Service: 5/25/2025 I Hospital Day: 18    Assessment & Plan  Primary central nervous system (CNS) lymphoma  Patient with development of worsening confusion, and was seen in the ED on 3/24/2025.  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  Lymphoma/leukemia panel was nondiagnostic  CTH 4/3/25: concerning for worsening hydrocephalus.   Repeat LP 4/7/25 = undiagnostic again   MRI brain 4/11/25: \"Interval enlargement of the dominant left anterior basal ganglionic mass lesion with greater surrounding vasogenic edema and mass effect. 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  S/p EVD, self removed 4/26  S/p Keppra 500mg BID x 7 days for postoperative seizure ppx   Started on chemotherapy during hospital stay and is for weekly Rituximab and Methotrexate every 2 weeks  S/p Dexamethasone taper = LD was on 5/19/25  Maintain PICC line  Had Rituxan 5/10/25, 5/23/25  Did get Filgrastim 5/17 and 5/18  Was due for Methotrexate on 5/17 but + sepsis w/bacteremia.   ID cleared pt to get chemo and he did get the Methotrexate 5/21/25 and Decadron 10 mg IV x 1, Zofran 16mg IV  Renal maintaining bicarb drip and pt getting q6hr UAs for urine pH --> bicarb drip now stopped 5/23 and now switched to Isolyte @75ml/hr.    Get Leucovorin q6hrs x 6 doses   To " get Methotrexate levels until <0.10. 0.4 5/23/25. 0.2 5/25/25  For daily CBC, CMP, methotrexate level  Pt has an appt with Dr Phan on 6/3/25   Type 2 diabetes mellitus with hyperglycemia, without long-term current use of insulin (HCC)  Hemoglobin A1C was 6.6 on 2/22/25  Sees Endo [facility] in [place]  Home:  Janumet XR  qd  Here: Januvia 50mg qd  Continue DM diet  On QID Accuchecks with SSI Algo 1  BSs have continued to improve with steroid being tapered  = LD was 5/19 5/17 creat bumped up to 1.38 so Metformin was placed on hold  Creatinine is 1.21  Since creatinine was stable, restarted Metformin 1000 mg XR 5/22/25. (Last contrast study was 5/18/25).    Stopped the Lispro WM 5/21/25.    No changes today  Mixed hyperlipidemia  Continue atorvastatin  Did have slightly elevated LFT's on prior labs but most recently normal, consider holding statin if LFTs trend back up  Thyroid nodule  TSH/free T4 4/21/25 = 0.019/1.13  Repeat TSH/free T4 done 5/12 = TSH was 0.287 with free T4 0.85  TSH has normalized 5/21.  D/W [facility] -->  plan for OP US.  Pulmonary nodule  CT chest 3 months follow-up  Liver lesion  Patient will require outpatient follow-up  Encephalopathy  Continue Seroquel  S/p steroid taper- last dose was 5/19/25  On 1:1  LETY (acute kidney injury) (HCC)  Monitor status post methotrexate  Peak creat was 2.77 on 4/22/25  Previous baseline as OP 5/2023-2/22/25 was 1.1 to 1.0  CMP 5/9/25 with creatinine of 1.18 down from 1.23 on 5/8 and on 5/10 was 1.15  CMP on 5/12/25 showed creatinine stable at 1.1  5/17: creat up again to 1.38 = Being followed by Nephrology. Met sepsis criteria 5/17 and placed on IVF ordered as per sepsis protocol  5/19: creat trending down and was 1.20 -->  IVF was  reduced to 50ml/hr per renal.  On 5/20/25-5/21/25, they want to continue same IVF  Creatinine 5/23 is 1.24  Cr now normal. Oncology would like to continue IV fluids for now.  Sepsis (HCC)  Patient  "noted to have hypotension and tachycardia on 5/17  Sepsis workup ordered and found to have positive blood cultures again, growing E coli as before  ID reconsulted  Started Cefepime 5/17/25 = await blood urine cx results but preliminary results of urine >100,000 GNR and prelim blood cx = GNR with blood cx ID panel positive for E.coli  Continue IVF until methotrexate level < 0.1  He has had stable BP, tachycardia has resolved and he has had no fevers  Updated plan from ID as below  Recurrent E. coli bacteremia  Patient completed 7 days of IV cefazolin which was finished on May 5  CT of A/P was done 5/18 per recommendation from ID looking for possible reason for recurrent bacteremia = was unrevealing  Was on Cetriaxone --> ID changed to Duricef to continue through 5/30/25 for a 14 days total of antibiotic tx  HTN (hypertension)  Home:  Losartan -25 qd  Here:  no meds   Stable BP today 5/23/25  Transaminitis  AST is 111, previously 114,  ALT is 322 previously 149  D/w H-O, they are aware =  \"Could be due to recent chemotherapy versus antibiotics versus liver lesions\".    CMP 5/9/25 = AST 57 (previously 111),  (previously 322)  Resolved  Leukopenia  resolved  Hyponatremia  resolved  Thrombocytopenia (HCC)  Per H-O  Platelets dropped to 66 on 5/18  On 5/19 was up to 79 --> 5/21 up to 144, 5/22 was 145 and today 153  For platelet transfusion if counts drop below 20K or in setting of any spontaneous bleeding   Anemia  Hgb dropped to 6.3 on 5/18 and was confirmed by repeat lab  at 6.7 so was transfused with one unit of PRBCs after discussion with Oncology  Spoke with wife over the phone and consent obtained  On 5/19/25, hemoglobin was up to 8.0 = appropriate response to transfusion  Hgb 8.7  Ankle edema, bilateral  Mild  Add TEDS    The above assessment and plan was reviewed and updated as determined by my evaluation of the patient on 5/25/2025.    History of Present Illness   Patient seen and examined. " Patients overnight issues or events were reviewed with nursing staff. New or overnight issues include the following:     Pt seen in his room. Methotrexate level is 0.2 so pt will not be discharged today. Family aware. Pt denies any complaints.    Not obtainable from pt.    Objective :  Temp:  [97.6 °F (36.4 °C)-98 °F (36.7 °C)] 97.6 °F (36.4 °C)  HR:  [80-88] 88  BP: (100-115)/(67-75) 114/73  Resp:  [13-18] 17  SpO2:  [96 %-98 %] 96 %  O2 Device: None (Room air)    Invasive Devices       Peripherally Inserted Central Catheter Line  Duration             PICC Line 05/01/25 Left Brachial 24 days                    Physical Exam  General Appearance: NAD; pleasant  HEENT: PERRLA, conjuctiva normal; mucous membranes moist; face symmetrical  Neck:  Supple  Lungs: clear bilaterally, normal respiratory effort, no retractions, expiratory effort normal, on room air  CV: regular rate and rhythm, no murmurs rubs or gallops noted   ABD: soft non tender, +BS x4  EXT: DP pulses intact, no lymphadenopathy, no edema  Skin: normal turgor, normal texture, no rash  Psych: affect normal, mood normal  Neuro: Awake and alert.   The above physical exam was reviewed and updated as determined by my evaluation of the patient on 5/25/2025.      Lab Results: I have reviewed the following results:  Results from last 7 days   Lab Units 05/25/25  0544 05/24/25  0538   WBC Thousand/uL 5.11 4.68   HEMOGLOBIN g/dL 9.3* 8.7*   HEMATOCRIT % 29.0* 25.8*   PLATELETS Thousands/uL 177 148*     Results from last 7 days   Lab Units 05/25/25  0544 05/24/25  0538   SODIUM mmol/L 139 139   POTASSIUM mmol/L 3.8 4.1   CHLORIDE mmol/L 102 103   CO2 mmol/L 30 30   BUN mg/dL 23 19   CREATININE mg/dL 1.21 0.97   CALCIUM mg/dL 9.5 8.9             Results from last 7 days   Lab Units 05/25/25  1044 05/25/25  0618 05/24/25  2054   POC GLUCOSE mg/dl 236* 106 131       Imaging Results Review: No pertinent imaging studies reviewed.  Other Study Results Review: Other studies  reviewed include: blood work    Review of Scheduled Meds: Medications  reviewed and reconciled as needed  Current Facility-Administered Medications   Medication Dose Route Frequency Provider Last Rate    acetaminophen  650 mg Oral Q6H PRN Crispin Arana MD      allopurinol  300 mg Oral Daily Crispin Arana MD      alteplase  2 mg Intracatheter Q1MIN PRN Crispin Arana MD      alteplase  2 mg Intracatheter Q1MIN PRN Magali Lee MD      alteplase  2 mg Intracatheter Q1MIN PRN Satnam Kebede MD      alteplase  2 mg Intracatheter Q1MIN PRN Satnam Kebede MD      alteplase  2 mg Intracatheter Q1MIN PRN Satnam Kebede MD      aluminum-magnesium hydroxide-simethicone  30 mL Oral Q4H PRN Crispin Arana MD      atorvastatin  20 mg Oral Daily Crispin Arana MD      bisacodyl  10 mg Rectal Daily PRN Skyler Hendrickson MD      calcium carbonate  1,000 mg Oral Daily PRN Crispin Arana MD      cefadroxil  1,000 mg Oral Q12H [facility] Lisa Singh MD      chlorhexidine  15 mL Mouth/Throat Q12H [facility] Crispin Arana MD      docusate sodium  100 mg Oral BID Jessicakatherin Arreola, DO      heparin (porcine)  5,000 Units Subcutaneous Q8H [facility] Crispin Arana MD      insulin lispro  1-5 Units Subcutaneous TID AC PATI Porras      insulin lispro  1-5 Units Subcutaneous HS APTI [place]ras      lactulose  20 g Oral Daily PRN Joshua Kaminski DO      melatonin  6 mg Oral HS Crispin Arana MD      metFORMIN  1,000 mg Oral Daily PATI [place]ras      OLANZapine  5 mg Intramuscular BID PRN Crispin Arana MD      ondansetron  4 mg Intravenous Q6H PRN Crispin Arana MD      oxyCODONE  2.5 mg Oral Q4H PRN Crispin Arana MD      Or    oxyCODONE  5 mg Oral Q4H PRN Crispin Arana MD      pantoprazole  40 mg Oral Early Morning Crispin Arana MD      polyethylene glycol  17 g Oral Daily PRN Skyler Hendrickson MD      QUEtiapine  12.5 mg Oral Daily Crispin Arana MD      QUEtiapine  50 mg Oral HS Crispin Arana MD      senna  2  tablet Oral Daily With Lunch Jessica Arreola,       sitaGLIPtin  50 mg Oral Daily PATI [place]ras      sodium chloride  20 mL/hr Intravenous Once PRN Satnam Kebede MD      sodium chloride  20 mL/hr Intravenous Once PRN Satnam Kebede MD      sodium chloride  50 mL/hr Intravenous Continuous Leta Adan PA-C 50 mL/hr (05/25/25 7665)       VTE Pharmacologic Prophylaxis: Heparin  Code Status: Level 1 - Full Code  Current Length of Stay: 18 day(s)    Administrative Statements   I have spent a total time of 30 minutes in caring for this patient on the day of the visit/encounter including Diagnostic results, Prognosis, Risks and benefits of tx options, Instructions for management, Patient and family education, Importance of tx compliance, Risk factor reductions, Impressions, Counseling / Coordination of care, Documenting in the medical record, Reviewing/placing orders in the medical record (including tests, medications, and/or procedures), Obtaining or reviewing history  , and Communicating with other healthcare professionals .  ** Please Note:  voice to text software may have been used in the creation of this document. Although proof errors in transcription or interpretation are a potential of such software**

## 2025-05-25 NOTE — ASSESSMENT & PLAN NOTE
"Patient with development of worsening confusion, and was seen in the ED on 3/24/2025.  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  Lymphoma/leukemia panel was nondiagnostic  CTH 4/3/25: concerning for worsening hydrocephalus.   Repeat LP 4/7/25 = undiagnostic again   MRI brain 4/11/25: \"Interval enlargement of the dominant left anterior basal ganglionic mass lesion with greater surrounding vasogenic edema and mass effect. 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  S/p EVD, self removed 4/26  S/p Keppra 500mg BID x 7 days for postoperative seizure ppx   Started on chemotherapy during hospital stay and is for weekly Rituximab and Methotrexate every 2 weeks  S/p Dexamethasone taper = LD was on 5/19/25  Maintain PICC line  Had Rituxan 5/10/25, 5/23/25  Did get Filgrastim 5/17 and 5/18  Was due for Methotrexate on 5/17 but + sepsis w/bacteremia.   ID cleared pt to get chemo and he did get the Methotrexate 5/21/25 and Decadron 10 mg IV x 1, Zofran 16mg IV  Renal maintaining bicarb drip and pt getting q6hr UAs for urine pH --> bicarb drip now stopped 5/23 and now switched to Isolyte @75ml/hr.    Get Leucovorin q6hrs x 6 doses   To get Methotrexate levels until <0.10. 0.4 5/23/25. 0.2 5/25/25  For daily CBC, CMP, methotrexate level  Pt has an appt with Dr Phan on 6/3/25   "

## 2025-05-25 NOTE — ASSESSMENT & PLAN NOTE
Hemoglobin A1C was 6.6 on 2/22/25  Sees Endo [facility] in [place]  Home:  Janumet XR  qd  Here: Januvia 50mg qd  Continue DM diet  On QID Accuchecks with SSI Algo 1  BSs have continued to improve with steroid being tapered  = LD was 5/19 5/17 creat bumped up to 1.38 so Metformin was placed on hold  Creatinine is 1.21  Since creatinine was stable, restarted Metformin 1000 mg XR 5/22/25. (Last contrast study was 5/18/25).    Stopped the Lispro WM 5/21/25.    No changes today

## 2025-05-25 NOTE — ASSESSMENT & PLAN NOTE
Patient's wife is in the room.    64 yoM with PMHx of DM2, NAFLD, and HT who presented with progressive encephalopathy found to have multiple scattered nodular cerebral and cerebellar enhancement who underwent two non-diagnostic LPs prompting stereotactic brain biopsy (4/14) which showed large B-cell lymphoma.  Patient received third cycle of high-dose methotrexate on 5/21/2025.  He is receiving leucovorin.  Methotrexate level from yesterday is pending and that we will determine the discharge.  Patient received Rituxan on 5/23/2025.  He seems to be doing well.  Not complaining of headaches and seizures.  No nausea and vomiting.  No cardiac and pulm symptoms at rest.  Appetite is low.  Constipation.    Recommendations:  Urine pH remained above 7.0.  24-hour post administration methotrexate 5.0, patient started on IV leucovorin, methotrexate level 48 hours came back at 0.4, leucovorin is transition to oral, continue same and have extended to 6 additional dose.    Continue to monitor methotrexate level daily and urine pH until levels are less than 0.10.  Will also continue leucovorin rescue.  Patient had outpatient appointment with Dr. Phan on 5/23 that will be moved given delay in his treatment and discharge.  Status post Rituxan on 5/23/2025.  Mild transaminitis noted, no dose adjustment required for Rituxan.  Continue to monitor CBC and CMP daily until methotrexate levels cleared.

## 2025-05-25 NOTE — PROGRESS NOTES
OT TREATMENT       05/25/25 0700   Pain Assessment   Pain Assessment Tool 0-10   Pain Score No Pain   Restrictions/Precautions   Precautions 1:1;Bed/chair alarms;Cognitive;Fall Risk;Limb alert;Supervision on toilet/commode;Multiple lines   Weight Bearing Restrictions No   ROM Restrictions No   Lifestyle   Autonomy Pt met supine in bed, agreeable to therapy   Eating   Type of Assistance Needed Independent   Physical Assistance Level No physical assistance   Comment Breakfast tray provided at end of session   Eating CARE Score 6   Oral Hygiene   Type of Assistance Needed Supervision   Physical Assistance Level No physical assistance   Comment in stance at sink   Oral Hygiene CARE Score 4   Shower/Bathe Self   Type of Assistance Needed Supervision   Physical Assistance Level No physical assistance   Comment Pt completed shower seated/stance with use of tub bench, GB and HH shower hose. Pt able to wash all body parts with S, min verbal cuing for safety during stance in shower. PICC covered and remained dry   Shower/Bathe Self CARE Score 4   Bathing   Assessed Bath Style Shower   Tub/Shower Transfer   Limitations Noted In Safety   Assessed Shower   Findings short distance ambulatory transfer to/from arm chair and tub bench with use of GB. Verbal cuing for safety/hand placement   Upper Body Dressing   Type of Assistance Needed Set-up / clean-up   Physical Assistance Level No physical assistance   Comment seated in chair, setup for clothing retrieval   Upper Body Dressing CARE Score 5   Lower Body Dressing   Type of Assistance Needed Supervision   Physical Assistance Level No physical assistance   Comment verbal cuing to thread BLE while seated vs stance. pt able to thread BLE and accelerate over hips with S   Lower Body Dressing CARE Score 4   Putting On/Taking Off Footwear   Type of Assistance Needed Supervision   Physical Assistance Level No physical assistance   Comment seated in recliner to doff/don B socks and shoes    Putting On/Taking Off Footwear CARE Score 4   Lying to Sitting on Side of Bed   Type of Assistance Needed Supervision   Physical Assistance Level No physical assistance   Comment HOB flat, no bed rails   Lying to Sitting on Side of Bed CARE Score 4   Sit to Stand   Type of Assistance Needed Supervision   Physical Assistance Level No physical assistance   Comment no AD   Sit to Stand CARE Score 4   Bed-Chair Transfer   Type of Assistance Needed Supervision   Physical Assistance Level No physical assistance   Comment no AD   Chair/Bed-to-Chair Transfer CARE Score 4   Toileting Hygiene   Type of Assistance Needed Supervision   Physical Assistance Level No physical assistance   Comment S for CM in stance and rear hygiene seated   Toileting Hygiene CARE Score 4   Toilet Transfer   Type of Assistance Needed Supervision   Physical Assistance Level No physical assistance   Comment no AD   Toilet Transfer CARE Score 4   Cognition   Overall Cognitive Status Impaired   Arousal/Participation Alert;Cooperative   Attention Attends with cues to redirect   Orientation Level Oriented to person   Memory Decreased long term memory;Decreased recall of biographical information;Decreased short term memory;Decreased recall of recent events;Decreased recall of precautions   Following Commands Follows one step commands without difficulty   Additional Activities   Additional Activities Other (Comment)   Additional Activities Comments Pt completed functional ambulation of household distances x4 trials throughout session, S no AD. Seated at EOM pt completed the following BUE/Core strengthening 3 sets x10 trials with 3lb therapy ball: chest press, shoulder press, modified sit up.   Activity Tolerance   Activity Tolerance Patient tolerated treatment well   Assessment   Treatment Assessment Pt participated in skilled OT session with focus on ADL retraining and strengthening. Pt tolerated treatment well, pt remained seated in recliner with all  immediate needs met, call bell accessible, chair alarm secured in direct care of 1:1 . Pt will continue to benefit from skilled OT services to ensure safe discharge.   Prognosis Fair   Problem List Decreased strength;Decreased endurance;Impaired balance;Decreased mobility;Decreased cognition;Impaired judgement;Decreased safety awareness   Plan   Treatment/Interventions Functional transfer training;Endurance training;Cognitive reorientation;Therapeutic exercise;Patient/family training;Equipment eval/education   Progress Progressing toward goals   Discharge Recommendation   Rehab Resource Intensity Level, OT   (pending)   OT Therapy Minutes   OT Time In 0700   OT Time Out 0830   OT Total Time (minutes) 90   OT Mode of treatment - Individual (minutes) 90   OT Mode of treatment - Concurrent (minutes) 0   OT Mode of treatment - Group (minutes) 0   OT Mode of treatment - Co-treat (minutes) 0   OT Mode of Treatment - Total time(minutes) 90 minutes   OT Cumulative Minutes 1105   Therapy Time missed   Time missed? No

## 2025-05-26 LAB
ALBUMIN SERPL BCG-MCNC: 3.7 G/DL (ref 3.5–5)
ALP SERPL-CCNC: 73 U/L (ref 34–104)
ALT SERPL W P-5'-P-CCNC: 181 U/L (ref 7–52)
ANION GAP SERPL CALCULATED.3IONS-SCNC: 10 MMOL/L (ref 4–13)
ANISOCYTOSIS BLD QL SMEAR: PRESENT
AST SERPL W P-5'-P-CCNC: 52 U/L (ref 13–39)
BASOPHILS # BLD MANUAL: 0.05 THOUSAND/UL (ref 0–0.1)
BASOPHILS NFR MAR MANUAL: 1 % (ref 0–1)
BILIRUB SERPL-MCNC: 0.7 MG/DL (ref 0.2–1)
BUN SERPL-MCNC: 28 MG/DL (ref 5–25)
CALCIUM SERPL-MCNC: 9.3 MG/DL (ref 8.4–10.2)
CHLORIDE SERPL-SCNC: 105 MMOL/L (ref 96–108)
CO2 SERPL-SCNC: 28 MMOL/L (ref 21–32)
CREAT SERPL-MCNC: 1.32 MG/DL (ref 0.6–1.3)
EOSINOPHIL # BLD MANUAL: 0 THOUSAND/UL (ref 0–0.4)
EOSINOPHIL NFR BLD MANUAL: 0 % (ref 0–6)
ERYTHROCYTE [DISTWIDTH] IN BLOOD BY AUTOMATED COUNT: 14.7 % (ref 11.6–15.1)
GFR SERPL CREATININE-BSD FRML MDRD: 56 ML/MIN/1.73SQ M
GLUCOSE P FAST SERPL-MCNC: 125 MG/DL (ref 65–99)
GLUCOSE SERPL-MCNC: 116 MG/DL (ref 65–140)
GLUCOSE SERPL-MCNC: 124 MG/DL (ref 65–140)
GLUCOSE SERPL-MCNC: 125 MG/DL (ref 65–140)
GLUCOSE SERPL-MCNC: 156 MG/DL (ref 65–140)
GLUCOSE SERPL-MCNC: 97 MG/DL (ref 65–140)
HCT VFR BLD AUTO: 28.6 % (ref 36.5–49.3)
HGB BLD-MCNC: 9.6 G/DL (ref 12–17)
LYMPHOCYTES # BLD AUTO: 2.85 THOUSAND/UL (ref 0.6–4.47)
LYMPHOCYTES # BLD AUTO: 46 % (ref 14–44)
MCH RBC QN AUTO: 31.2 PG (ref 26.8–34.3)
MCHC RBC AUTO-ENTMCNC: 33.6 G/DL (ref 31.4–37.4)
MCV RBC AUTO: 93 FL (ref 82–98)
MONOCYTES # BLD AUTO: 0 THOUSAND/UL (ref 0–1.22)
MONOCYTES NFR BLD: 0 % (ref 4–12)
NEUTROPHILS # BLD MANUAL: 2.47 THOUSAND/UL (ref 1.85–7.62)
NEUTS SEG NFR BLD AUTO: 46 % (ref 43–75)
PLATELET # BLD AUTO: 207 THOUSANDS/UL (ref 149–390)
PLATELET BLD QL SMEAR: ADEQUATE
PMV BLD AUTO: 10.1 FL (ref 8.9–12.7)
POLYCHROMASIA BLD QL SMEAR: PRESENT
POTASSIUM SERPL-SCNC: 4.3 MMOL/L (ref 3.5–5.3)
PROT SERPL-MCNC: 5.9 G/DL (ref 6.4–8.4)
RBC # BLD AUTO: 3.08 MILLION/UL (ref 3.88–5.62)
RBC MORPH BLD: PRESENT
SODIUM SERPL-SCNC: 143 MMOL/L (ref 135–147)
VARIANT LYMPHS # BLD AUTO: 7 %
WBC # BLD AUTO: 5.38 THOUSAND/UL (ref 4.31–10.16)

## 2025-05-26 PROCEDURE — 97535 SELF CARE MNGMENT TRAINING: CPT

## 2025-05-26 PROCEDURE — 80053 COMPREHEN METABOLIC PANEL: CPT | Performed by: PHYSICIAN ASSISTANT

## 2025-05-26 PROCEDURE — 82948 REAGENT STRIP/BLOOD GLUCOSE: CPT

## 2025-05-26 PROCEDURE — 99232 SBSQ HOSP IP/OBS MODERATE 35: CPT | Performed by: INTERNAL MEDICINE

## 2025-05-26 PROCEDURE — 97110 THERAPEUTIC EXERCISES: CPT

## 2025-05-26 PROCEDURE — 99233 SBSQ HOSP IP/OBS HIGH 50: CPT | Performed by: PHYSICIAN ASSISTANT

## 2025-05-26 PROCEDURE — 97530 THERAPEUTIC ACTIVITIES: CPT

## 2025-05-26 PROCEDURE — 85007 BL SMEAR W/DIFF WBC COUNT: CPT | Performed by: PHYSICIAN ASSISTANT

## 2025-05-26 PROCEDURE — 85027 COMPLETE CBC AUTOMATED: CPT | Performed by: PHYSICIAN ASSISTANT

## 2025-05-26 PROCEDURE — 80204 DRUG ASSAY METHOTREXATE: CPT

## 2025-05-26 PROCEDURE — 99232 SBSQ HOSP IP/OBS MODERATE 35: CPT | Performed by: STUDENT IN AN ORGANIZED HEALTH CARE EDUCATION/TRAINING PROGRAM

## 2025-05-26 RX ORDER — LEUCOVORIN CALCIUM 25 MG/1
25 TABLET ORAL EVERY 6 HOURS
Status: COMPLETED | OUTPATIENT
Start: 2025-05-26 | End: 2025-05-27

## 2025-05-26 RX ADMIN — HEPARIN SODIUM 5000 UNITS: 5000 INJECTION INTRAVENOUS; SUBCUTANEOUS at 21:35

## 2025-05-26 RX ADMIN — DOCUSATE SODIUM 100 MG: 100 CAPSULE, LIQUID FILLED ORAL at 09:02

## 2025-05-26 RX ADMIN — Medication 6 MG: at 21:35

## 2025-05-26 RX ADMIN — QUETIAPINE 50 MG: 25 TABLET ORAL at 21:35

## 2025-05-26 RX ADMIN — CHLORHEXIDINE GLUCONATE 15 ML: 1.2 SOLUTION ORAL at 21:35

## 2025-05-26 RX ADMIN — LEUCOVORIN CALCIUM 25 MG: 25 TABLET ORAL at 21:36

## 2025-05-26 RX ADMIN — SENNOSIDES 17.2 MG: 8.6 TABLET, FILM COATED ORAL at 12:48

## 2025-05-26 RX ADMIN — SITAGLIPTIN 50 MG: 50 TABLET, FILM COATED ORAL at 09:02

## 2025-05-26 RX ADMIN — METFORMIN ER 500 MG 1000 MG: 500 TABLET ORAL at 09:03

## 2025-05-26 RX ADMIN — INSULIN LISPRO 1 UNITS: 100 INJECTION, SOLUTION INTRAVENOUS; SUBCUTANEOUS at 12:48

## 2025-05-26 RX ADMIN — PANTOPRAZOLE SODIUM 40 MG: 40 TABLET, DELAYED RELEASE ORAL at 05:32

## 2025-05-26 RX ADMIN — HEPARIN SODIUM 5000 UNITS: 5000 INJECTION INTRAVENOUS; SUBCUTANEOUS at 13:55

## 2025-05-26 RX ADMIN — LEUCOVORIN CALCIUM 25 MG: 25 TABLET ORAL at 09:03

## 2025-05-26 RX ADMIN — HEPARIN SODIUM 5000 UNITS: 5000 INJECTION INTRAVENOUS; SUBCUTANEOUS at 05:32

## 2025-05-26 RX ADMIN — CEFADROXIL 1000 MG: 500 CAPSULE ORAL at 09:03

## 2025-05-26 RX ADMIN — LEUCOVORIN CALCIUM 25 MG: 25 TABLET ORAL at 13:56

## 2025-05-26 RX ADMIN — DOCUSATE SODIUM 100 MG: 100 CAPSULE, LIQUID FILLED ORAL at 17:26

## 2025-05-26 RX ADMIN — CEFADROXIL 1000 MG: 500 CAPSULE ORAL at 21:36

## 2025-05-26 RX ADMIN — ALLOPURINOL 300 MG: 300 TABLET ORAL at 09:02

## 2025-05-26 RX ADMIN — ATORVASTATIN CALCIUM 20 MG: 20 TABLET, FILM COATED ORAL at 09:02

## 2025-05-26 RX ADMIN — QUETIAPINE 12.5 MG: 25 TABLET ORAL at 12:48

## 2025-05-26 RX ADMIN — ALUMINUM HYDROXIDE, MAGNESIUM HYDROXIDE, AND SIMETHICONE 30 ML: 200; 200; 20 SUSPENSION ORAL at 10:20

## 2025-05-26 RX ADMIN — SODIUM CHLORIDE 75 ML/HR: 0.9 INJECTION, SOLUTION INTRAVENOUS at 13:55

## 2025-05-26 NOTE — ASSESSMENT & PLAN NOTE
Hgb dropped to 6.3 on 5/18 and was confirmed by repeat lab  at 6.7 so was transfused with one unit of PRBCs after discussion with Oncology  Spoke with wife over the phone and consent obtained  On 5/19/25, hemoglobin was up to 8.0 = appropriate response to transfusion  Hgb 9.6

## 2025-05-26 NOTE — ASSESSMENT & PLAN NOTE
Patient's wife is in the room.    64 yoM with PMHx of DM2, NAFLD, and HT who presented with progressive encephalopathy found to have multiple scattered nodular cerebral and cerebellar enhancement who underwent two non-diagnostic LPs prompting stereotactic brain biopsy (4/14) which showed large B-cell lymphoma.  Patient received third cycle of high-dose methotrexate on 5/21/2025.  He is receiving leucovorin.    Patient received Rituxan on 5/23/2025.     Treatment Plan: regimen modified from (https://pubmed.ncbi.nlm.nih.gov/58857681/)     Biweekly HD-MTX/rituximab (8 g/m(2)/dose; 375 mg/m(2)/dose) for 4-6 cycles (induction) following with every 4 weeks HD-MTX (8 g/m(2)/dose) for 4 cycles (maintenance).     C1 (4/17) 8 g/m²  C2 (5/2) 3 g/m², dose-reduced due to LETY, delayed x1 day due to E. coli bacteremia.  C3 (was planned for 5/17) delayed due to E. coli bacteremia, s/p MTX 6 g/m2 on 5/21.  Recommendations:  Urine pH remained above 7.0.  24-hour post administration methotrexate 5.0, patient started on IV leucovorin, methotrexate level 48 hours came back at 0.4, leucovorin is transition to oral, continue same and have extended to 6 additional dose.    Continue to monitor methotrexate level, 5.10-->.40--> 0.25, levels from 5.25.25 pending.  Continue oral leucovorin.  Continue to monitor methotrexate level daily and urine pH until levels are less than 0.10.  Status post Rituxan on 5/23/2025. Mild transaminitis noted, no dose adjustment required for Rituxan.  Creatinine slightly up trended to 1.32 this morning, patient remains on IV fluid at 50 cc/h, nephrology following. Rec increasing rate to 75cc/hr  Continue to monitor CBC and CMP daily until methotrexate levels cleared.    Methotrexate level              Component  Ref Range & Units (hover) 5/24/25 1521 5/23/25 1748 5/22/25 1628       Methotrexate Lvl 0.25 0.40 5.10

## 2025-05-26 NOTE — PROGRESS NOTES
Progress Note - PMR   Name: Alexis Dennison 64 y.o. 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 Can discharge once level in desired range. Anticipate this may be tomorrow and discussed with nursing to inform his wife Naldo if level is appropriate. Waiting for clearance from Heme/Onc for discharge  Level 5/24 of 0.25 and not yet below 0.10  - Onc to set-up outpatient Rituximab infusions for after discharge  - Daily methotrexate levels  - Hem/Onc following  - NSGY following  - SLP for cognition  Type 2 diabetes mellitus with hyperglycemia, without long-term current use of insulin ([facility])  Lab Results   Component Value Date    HGBA1C 6.6 (H) 02/22/2025       Recent Labs     05/25/25  1044 05/25/25  1539 05/25/25 2025 05/26/25  0619   POCGLU 236* 136 137 124       Blood Sugar Average: Last 72 hrs:  (P) [id number]    - Metformin 1000mg daily restarted 5/22  - Lantus 10u daily  with Lispro 8u TID and SSI  - Monitor accuchecks while on steroids  - Management per IM  HTN, goal below 130/80  > Blood pressure controlled off antihypertensives    - Monitor VS  - Management per IM  Mixed hyperlipidemia  - Continue atorvastatin 10 mg daily  Thyroid nodule  Outpatient endocrine follow-up needed  Pulmonary nodule  > CTA 3/29: nonspecific 4 mm RLL pulmonary nodule   - follow up outpatient for repeat imaging in 3 months  Liver lesion  > CTA 3/29- nonspecific 12mm hypodense right hepatic lesion, recommended MRI abdomen  > MRI abdomen 3/30- simple right hepatic lobe cyst    - Follow up outpatient for monitoring  Ambulatory dysfunction  - Fall precautions  - PT/OT  Encephalopathy  > Patient has been pleasantly confused for months in the setting of brain mass.  > acutely worsened due to UTI and bacteremia. S/p 7 day course of antibx  > Continues to lack insight to his current situation and is severely cognitively impaired.  He does not get agitated for long periods of time and is mostly confused and impulsive.  > A&O 1 on [facility] admission    - Continue seroquel 12.5mg at noon with 50mg HS  - PRN Zyprexa 2.5mg IM as needed for agitation  -Overstimulation precautions, frequent re-orientation, re-direction, re-assurance  -Sleep log and agitation monitoring if needed  -Optimize sleep-wake cycle  -Limit sedating medications when possible  - Ensure optimal management electrolytes, nutrition, and hydration  - Ensure optimal bowel/bladder management  - Ensure optimal pain management   -Patient/family/caregiver education and training   -Continue 1:1 due to overall safety, impulsivity, lack of safety awareness and poor insight.  -Fall precautions with frequent rounding; proactive toileting program, patient should not be unattended in bathroom      LETY (acute kidney injury) (HCC)  > Baseline Cr 0.9  > Recently 1.3 improved from before- peak 2.7  > Now s/p bicarb    - IVF per IM/Nephro  - Management at discretion of  nephro   E. coli UTI  > Pet met sepsis criteria on 4/29 with confusion and agitation. UA was positive. Urine culture and blood cultures showed Ecoli sensitive to cephalosporins.  > ID consulted and pt completed 7d course of ceftriaxone  > Approved to restart chemo 5/2.  > Chemo on hold for GNR UTI and bacteremia > restarted chemo on 5/21    - Monitor while on antibiotics  Impaired mobility and activities of daily living  Patient was evaluated by the rehabilitation team MD and an appropriate candidate for acute inpatient rehabilitation program at this time.  The patient will tolerate 3 hours/day 5 to 7 days/week of intensive physical, occupational in order to obtain goals for community discharge  Due to the patient's functional Compared to their baseline level of function in addition to their ongoing medical needs, the patient would benefit from daily supervision from a rehabilitation physician as well as rehabilitation nursing to implement and adjust the medical as well as functional plan of care in order to meet the patient's goals.  HTN (hypertension)  Had been on losartan but holding at this time- his blood pressures are good  Transaminitis  CMP checked on 5/9 and AST is down to 57 from 111 and ALT to 281 from 322  5/19 labs: AST 11 and ALT 25.  5/23- AST 89 and  - monitor routinely while on MTX  Hyponatremia  Sodium stable at 140 on 5/19 labs  5/23- Na 139 and stalbe  Thrombocytopenia (HCC)  5/19- Platelets down to 79K- will need to continue monitoring with chemotherapy now on hold  5/23- Plts improved to 153K from 145K  Sepsis (HCC)  5/19- Sepsis alert over the weekend.  CT A/P showed perinephric stranding. UA abnormal and Ucx grew GNR in urine. Blood cultures also + for GNR. Lactate 4.4>5.1. procal 7.39> 6.55. Started on IV antibiotics and Chemo on hold per Onc  5/22- Cleared by ID to restart chemo. Transitioned to PO antibiotics  - Continue antibiotics  - Monitor WBC on routine labs  - Follow temp  curve  Recurrent E. coli bacteremia  5/20- Antibx changed from cefepime to ceftriaxone per ID recommendations. Need to follow up final susceptibilities and adjust antibiotics accordingly  Now on oral Duricef, no longer on IV medications  Leukopenia  5/19- WBC improving. Now 3.93 from 1.84 off chemo  5/23- WBC stable at 6.54  Anemia  5/19 - hemoglobin of 8.0 - improved from 6.7 after transfusion of 1u pRBC over the weekend  5/23- Hgb 9.9  -- improved from 8.9  At risk for acid-base imbalance  Urine pH testing and sodium bicarb drip with MTX treatments > restarted now on bicarb 100 ml/hr  Electrolyte imbalance risk    SIRS (systemic inflammatory response syndrome) (HCC)    Ankle edema, bilateral      Subjective   Patient is a 64-year-old male with history of hyperlipidemia hypertension, GERD, type 2 diabetes, nonalcoholic fatty liver disease and sleep apnea presenting on 3/29 for increased confusion after recently being diagnosed with a brain mass underwent workup with consistent findings of a CNS lymphoma noted treated with methotrexate and Rituxan and course complicated LETY, delirium and agitation as well as an E. coli UTI and bacteremia     Chief Complaint: f/u non-traumatic brain injury and f/u ambulatory dysfunction    Interval: Patient seen and evaluated in room without any acute issues overnight.  Is participating in therapy and seen ambulating in the hallways.  No fever, chills, nausea vomiting, cough shortness of diarrhea or constipation.  Limited documentation of meal completion but quite variable.  Voiding with incontinence and last bowel movement on 5/23.  CBC and BMP were both obtained today showing relatively stable anemia with hemoglobin of 9.6.  On the CMP the liver enzymes have slightly improved however the BUN is slightly up as well as the creatinine.  Will discuss with internal medicine.  Nephrology has been following and oncology is also following as well.  Will need a plan as we anticipate that he  should be discharged pending his labs that are in process currently.    Objective :  Temp:  [97.5 °F (36.4 °C)-98.4 °F (36.9 °C)] 97.5 °F (36.4 °C)  HR:  [92-99] 94  BP: (110-121)/(71-82) 110/82  Resp:  [16-18] 17  SpO2:  [95 %-97 %] 97 %  O2 Device: None (Room air)    Functional Update:  Physical Therapy Occupational Therapy Speech Therapy   Weight Bearing Status: Full Weight Bearing  Transfers: Supervision  Bed Mobility: Supervision  Amulation Distance (ft): 250 feet  Ambulation: Supervision  Assistive Device for Ambulation: Roller Walker (and with no AD)  Number of Stairs: 12  Assistive Device for Stairs: Right Hand Rail  Stair Assistance: Supervision  Ramp: Incidental Touching  Assistive Device for Ramp: None  Discharge Recommendations: Home with:  DC Home with:: 24 Hour Supervision   Eating:  (setup)  Grooming: Supervision  Bathing: Supervision, Incidental Touching  Bathing: Supervision, Incidental Touching  Upper Body Dressing: Supervision  Lower Body Dressing: Supervision  Toileting: Supervision, Incidental Touching  Tub/Shower Transfer: Incidental Touching  Toilet Transfer: Incidental Touching  Cognition: Exceptions to WNL  Cognition: Decreased Memory, Decreased Comprehension, Decreased Safety, Decreased Executive Functions, Decreased Attention  Orientation: Person, Place   Mode of Communication: Verbal  Speech/Language:  (word finding difficulty)  Cognition: Exceptions to WNL  Cognition: Decreased Memory, Decreased Executive Functions, Decreased Attention, Decreased Comprehension, Decreased Safety  Orientation: Person  Discharge Recommendations: Home with:  DC Home with:: 24 Hour Supervision, 24 Hour Assisteance, Family Support, Home Speech Therapy, Outpatient Speech Therapy     Physical Exam  Vitals reviewed.   Constitutional:       Appearance: Normal appearance.   HENT:      Head: Normocephalic.      Comments: Biopsy site healing well     Right Ear: External ear normal.      Left Ear: External ear normal.       Nose: Nose normal. No rhinorrhea.      Mouth/Throat:      Mouth: Mucous membranes are moist.      Pharynx: Oropharynx is clear.     Eyes:      General: No scleral icterus.      Cardiovascular:      Rate and Rhythm: Normal rate.   Pulmonary:      Effort: Pulmonary effort is normal. No respiratory distress.   Abdominal:      General: There is no distension.      Palpations: Abdomen is soft.     Musculoskeletal:      Right lower leg: No edema.      Left lower leg: No edema.     Skin:     General: Skin is warm and dry.      Coloration: Skin is pale.     Neurological:      Mental Status: He is alert.      Motor: No weakness.      Comments: Oriented to self only limited safety awareness and insight   Psychiatric:         Mood and Affect: Mood normal.         Behavior: Behavior normal.             Scheduled Meds:  Current Facility-Administered Medications   Medication Dose Route Frequency Provider Last Rate    acetaminophen  650 mg Oral Q6H PRN Crispin Arana MD      allopurinol  300 mg Oral Daily Crispin Arana MD      alteplase  2 mg Intracatheter Q1MIN PRN Crispin Arana MD      alteplase  2 mg Intracatheter Q1MIN PRN Magali Lee MD      alteplase  2 mg Intracatheter Q1MIN PRN Satnam Kebede MD      alteplase  2 mg Intracatheter Q1MIN PRN Satnam Kebede MD      alteplase  2 mg Intracatheter Q1MIN PRN Satnam Kebede MD      aluminum-magnesium hydroxide-simethicone  30 mL Oral Q4H PRN Crispin Arana MD      atorvastatin  20 mg Oral Daily Crispin Arana MD      bisacodyl  10 mg Rectal Daily PRN Skyler Hendrickson MD      calcium carbonate  1,000 mg Oral Daily PRN Crispin Arana MD      cefadroxil  1,000 mg Oral Q12H [facility] Lisa Singh MD      chlorhexidine  15 mL Mouth/Throat Q12H [facility] Crispin Arana MD      docusate sodium  100 mg Oral BID Jessica Arreola DO      heparin (porcine)  5,000 Units Subcutaneous Q8H [facility] Crispin Arana MD      insulin lispro  1-5 Units Subcutaneous TID  PATI Porras       insulin lispro  1-5 Units Subcutaneous HS PATI Porras      lactulose  20 g Oral Daily PRN Joshua Kaminski, DO      leucovorin  25 mg Oral Q6H Nicki Renteria MD      melatonin  6 mg Oral HS Crispin Arana MD      metFORMIN  1,000 mg Oral Daily PATI Porras      OLANZapine  5 mg Intramuscular BID PRN Crispin Arana MD      ondansetron  4 mg Intravenous Q6H PRN Crispin Arana MD      oxyCODONE  2.5 mg Oral Q4H PRN Crispin Arana MD      Or    oxyCODONE  5 mg Oral Q4H PRN Crispin Arana MD      pantoprazole  40 mg Oral Early Morning Crispin Arana MD      polyethylene glycol  17 g Oral Daily PRN Skyler Hendrickson MD      QUEtiapine  12.5 mg Oral Daily Crispin Arana MD      QUEtiapine  50 mg Oral HS Crispin Arana MD      senna  2 tablet Oral Daily With Lunch Jessica Arreola DO      sitaGLIPtin  50 mg Oral Daily PATI Porras      sodium chloride  20 mL/hr Intravenous Once PRN Satanm Kebede MD      sodium chloride  20 mL/hr Intravenous Once PRN Satnam Kebede MD      sodium chloride  50 mL/hr Intravenous Continuous Leta Adan PA-C 50 mL/hr (05/26/25 0649)         Lab Results: I have reviewed the following results:  Results from last 7 days   Lab Units 05/26/25  0455 05/25/25  0544 05/24/25  0538   HEMOGLOBIN g/dL 9.6* 9.3* 8.7*   HEMATOCRIT % 28.6* 29.0* 25.8*   WBC Thousand/uL 5.38 5.11 4.68   PLATELETS Thousands/uL 207 177 148*     Results from last 7 days   Lab Units 05/26/25  0455 05/25/25  0544 05/24/25  0538   BUN mg/dL 28* 23 19   SODIUM mmol/L 143 139 139   POTASSIUM mmol/L 4.3 3.8 4.1   CHLORIDE mmol/L 105 102 103   CREATININE mg/dL 1.32* 1.21 0.97   AST U/L 52* 90* 47*   ALT U/L 181* 194* 123*                Joshua Kaminski DO  Physical Medicine and Rehabilitation  [facility]    I have spent a total time of 35 minutes in caring for this patient on the day of the visit/encounter including Counseling / Coordination of care, Documenting in  the medical record, Reviewing/placing orders in the medical record (including tests, medications, and/or procedures), and Communicating with other healthcare professionals .

## 2025-05-26 NOTE — ASSESSMENT & PLAN NOTE
Monitor status post methotrexate  Peak creat was 2.77 on 4/22/25  Previous baseline as OP 5/2023-2/22/25 was 1.1 to 1.0  CMP 5/9/25 with creatinine of 1.18 down from 1.23 on 5/8 and on 5/10 was 1.15  CMP on 5/12/25 showed creatinine stable at 1.1  5/17: creat up again to 1.38 = Being followed by Nephrology. Met sepsis criteria 5/17 and placed on IVF ordered as per sepsis protocol  5/19: creat trending down and was 1.20 -->  IVF was  reduced to 50ml/hr per renal.  On 5/20/25-5/21/25, they want to continue same IVF  Creatinine 5/23 is 1.24  Cr today back up to 1.32  Renal made aware. IVF rate increased today from 50/hr to 75/hr. Metformin placed on hold again. Recheck labs in AM

## 2025-05-26 NOTE — PROGRESS NOTES
"   05/26/25 1000   Pain Assessment   Pain Assessment Tool 0-10   Pain Score 8   Pain Location/Orientation Orientation: Lower;Location: Abdomen   Effect of Pain on Daily Activities Due to pain initially denying to complete therapy, after BM and seated therex pt feeling slightly better and williing to participate   Patient's Stated Pain Goal No pain   Hospital Pain Intervention(s) Repositioned;Ambulation/increased activity;Rest  (RN Drew notified and he provided medication; pt had BM and felt a little better; seated therex to get things moving and pt reported feeling a little better. End of session pt reported 0/10 abdominal pain)   Restrictions/Precautions   Precautions 1:1;Bed/chair alarms;Cognitive;Fall Risk;Limb alert;Multiple lines;Supervision on toilet/commode   Weight Bearing Restrictions No   ROM Restrictions No   Cognition   Arousal/Participation Cooperative;Responsive;Alert   Comments Pt talking about donuts in his room and \"the cow poop donuts\" when asked what he wanted to focus on for session.   Subjective   Subjective \"My stomach is hurting really bad\"   Sit to Stand   Type of Assistance Needed Supervision   Sit to Stand CARE Score 4   Bed-Chair Transfer   Type of Assistance Needed Supervision   Chair/Bed-to-Chair Transfer CARE Score 4   Transfer Bed/Chair/Wheelchair   Limitations Noted In Balance;Problem Solving  (VC needed to manage multiple lines)   Adaptive Equipment None   Walk 10 Feet   Type of Assistance Needed Supervision   Walk 10 Feet CARE Score 4   Walk 50 Feet with Two Turns   Type of Assistance Needed Supervision   Walk 50 Feet with Two Turns CARE Score 4   Walk 150 Feet   Type of Assistance Needed Supervision   Walk 150 Feet CARE Score 4   Ambulation   Primary Mode of Locomotion Prior to Admission Walk   Distance Walked (feet) 350 ft  (x3; 250 x1)   Assist Device   (none)   Gait Pattern Inconsistant Vicki;Slow Vicki   Limitations Noted In Balance   Walk Assist Level Supervision   Does " "the patient walk? 2. Yes   Curb or Single Stair   Style negotiated Single stair   Type of Assistance Needed Supervision   1 Step (Curb) CARE Score 4   4 Steps   Type of Assistance Needed Supervision   4 Steps CARE Score 4   12 Steps   Type of Assistance Needed Supervision   Comment RHR on 6\"  steps; VC for turns so PT could manage IV pole   12 Steps CARE Score 4   Stairs   Type Stairs   # of Steps 12   Weight Bearing Precautions Fall Risk   Assist Devices Single Rail   Findings pt asc/desc with varying reciprocal/non reciprocal pattern with single UE support on RHR   Picking Up Object   Type of Assistance Needed Supervision   Comment picked up marker from floor   Picking Up Object CARE Score 4   Toilet Transfer   Type of Assistance Needed Supervision   Comment no AD   Toilet Transfer CARE Score 4   Toilet Transfer   Surface Assessed Standard Toilet   Limitations Noted In Safety;Problem Solving  (VC to manage multiple lines)   Adaptive Equipment Grab Bar   Positioning Concerns Cognition;Safety   Therapeutic Interventions   Strengthening seated B marches and LAQ x30. 2x10 STS with single UE support.   Neuromuscular Re-Education side stepping in hallway without UE support x2 length of railing   Equipment Use   NuStep x15 min L0 BUE/LE   Assessment   Treatment Assessment Pt participated in skilled PT session with emphasis on gait training, endurance training and B LE strengthening. With intermittent rest breaks throguhout session pt is able to complete multiple community distance ambulation trials without needing a seated rest break demonstrating improving endurance. Pt had inc pain at start of session but had BM and after this and inc activity pt reported no pain at end of session. Continue per PT POC to work on repetitive functional task practice to improve QoL and dec fall risk.   Problem List Decreased endurance;Impaired balance;Decreased mobility;Decreased coordination;Decreased cognition;Impaired " judgement;Decreased safety awareness   Barriers to Discharge None   PT Barriers   Physical Impairment Decreased strength;Decreased endurance;Impaired balance;Decreased cognition   Functional Limitation Car transfers;Ramp negotiation;Stair negotiation;Standing;Transfers;Walking   Plan   Treatment/Interventions Functional transfer training;LE strengthening/ROM;Elevations;Therapeutic exercise;Endurance training;Cognitive reorientation;Patient/family training;Equipment eval/education;Bed mobility;Gait training;Compensatory technique education   Progress Progressing toward goals   PT Therapy Minutes   PT Time In 1000   PT Time Out 1130   PT Total Time (minutes) 90   PT Mode of treatment - Individual (minutes) 90   PT Mode of treatment - Concurrent (minutes) 0   PT Mode of treatment - Group (minutes) 0   PT Mode of treatment - Co-treat (minutes) 0   PT Mode of Treatment - Total time(minutes) 90 minutes   PT Cumulative Minutes 1221   Therapy Time missed   Time missed? No

## 2025-05-26 NOTE — ASSESSMENT & PLAN NOTE
Hemoglobin A1C was 6.6 on 2/22/25  Sees End[facility] in [place]  Home:  Janumet XR  qd  Here: Januvia 50mg qd  Continue DM diet  On QID Accuchecks with SSI Algo 1  BSs have continued to improve with steroid being tapered  = LD was 5/19 5/17 creat bumped up to 1.38 so Metformin was placed on hold  Since creatinine was stable, restarted Metformin 1000 mg XR 5/22/25. (Last contrast study was 5/18/25).    Stopped the Lispro WM 5/21/25.    5/26- Creatinine is 1.32 today- will hold metformin again starting in AM. Continue SSI

## 2025-05-26 NOTE — ASSESSMENT & PLAN NOTE
Patient completed 7 days of IV cefazolin which was finished on May 5  CT of A/P was done 5/18 per recommendation from ID looking for possible reason for recurrent bacteremia = was unrevealing  Was on Ceftriaxone --> ID changed to Duricef to continue through 5/30/25 for a 14 days total of antibiotic tx

## 2025-05-26 NOTE — ASSESSMENT & PLAN NOTE
Lab Results   Component Value Date    HGBA1C 6.6 (H) 02/22/2025       Recent Labs     05/25/25  1044 05/25/25  1539 05/25/25 2025 05/26/25  0619   POCGLU 236* 136 137 124       Blood Sugar Average: Last 72 hrs:  (P) [id number]    - Metformin 1000mg daily restarted 5/22  - Lantus 10u daily with Lispro 8u TID and SSI  - Monitor accuchecks while on steroids  - Management per IM

## 2025-05-26 NOTE — ASSESSMENT & PLAN NOTE
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 Can discharge once level in desired range. Anticipate this may be tomorrow and discussed with nursing to inform his wife Naldo if level is appropriate. Waiting for clearance from Heme/Onc for discharge  Level 5/24 of 0.25 and not yet below 0.10  - Onc to set-up outpatient Rituximab infusions for after discharge  - Daily methotrexate levels  - Hem/Onc following  - NSGY following  - SLP for cognition

## 2025-05-26 NOTE — ASSESSMENT & PLAN NOTE
Per H-O  Platelets dropped to 66 on 5/18  On 5/19 was up to 79 --> 5/21 up to 144, 5/22 was 145 and today 207  For platelet transfusion if counts drop below 20K or in setting of any spontaneous bleeding

## 2025-05-26 NOTE — PROGRESS NOTES
"   05/26/25 0700   Pain Assessment   Pain Assessment Tool 0-10   Pain Score No Pain   Restrictions/Precautions   Precautions 1:1;Bed/chair alarms;Cognitive;Fall Risk;Limb alert;Supervision on toilet/commode;Multiple lines   Weight Bearing Restrictions No   ROM Restrictions No   Lifestyle   Autonomy \"I'm waiting for breakfast\" upon OT entry when asked how he's doing this morning   Eating   Type of Assistance Needed Supervision   Comment DS per SLP, pt w/ 1:1 PCA who provided sup and set up for meal   Eating CARE Score 4   Oral Hygiene   Type of Assistance Needed Supervision   Comment DS seated at sink. cued pt to brush his teeth and he was able to select appropriate items for task and sequence appropriately   Oral Hygiene CARE Score 4   Grooming   Findings Sup for applying deodorant seated at sink, required cue to remove deodorant lid for application   Shower/Bathe Self   Type of Assistance Needed Supervision   Comment SB routine 2' IV running. Completed primarily seated at sink. Pt able wash UB and down to feet w/ sup and min cues for initiating LB washing. In stance pt washes groin and buttocks w/ sup for safety but no LOB   Shower/Bathe Self CARE Score 4   Bathing   Assessed Bath Style Sponge Bath   Anticipated D/C Bath Style Sponge Bath;Shower   Findings  SB 2' active IV   Tub/Shower Transfer   Reason Not Assessed Medical;Sponge Bath   Upper Body Dressing   Type of Assistance Needed Supervision   Comment cues to initiate. IV disconnected by ADELINE Russell for UB dressing and remained off for remainder of session.   Upper Body Dressing CARE Score 4   Lower Body Dressing   Type of Assistance Needed Physical assistance   Physical Assistance Level 25% or less   Comment sup for safety for  pants CM over hips which pt completes in stance, no LOB. Assist for applying/removing tabbed brief   Lower Body Dressing CARE Score 3   Putting On/Taking Off Footwear   Type of Assistance Needed Supervision   Comment seated w/ cross leg " "tech and forward reach reach pt able to doff  socks and don TEDs and slip on sneakers   Putting On/Taking Off Footwear CARE Score 4   Lying to Sitting on Side of Bed   Type of Assistance Needed Supervision   Lying to Sitting on Side of Bed CARE Score 4   Sit to Stand   Type of Assistance Needed Supervision   Comment no AD, sup for safety   Sit to Stand CARE Score 4   Bed-Chair Transfer   Type of Assistance Needed Supervision   Comment sup EOB>WC SPT w/ several steps to WC, assist to manage IV pole   Chair/Bed-to-Chair Transfer CARE Score 4   Transfers   Additional Comments IV diconnected completed mobility room<>Ot gym at  level no AD, no LOB but dec gait speed, step length, takes wide turns   Exercise Tools   UE Ergometer table top ergometer for 5min prograde and 5min retrograde on min/mod resistance. Tolerated well w/ short rest breaks to manage fatigue   Cognition   Overall Cognitive Status Impaired   Arousal/Participation Cooperative;Responsive;Alert   Attention Difficulty attending to directions   Orientation Level Oriented to person  (unable to report place, reports place of living as \"[place]\" and not [place] as per chart, unable to report current place or town at start and end of session. Was appropriately oriented to morning at start of session w/o cues)   Memory Decreased long term memory;Decreased recall of biographical information;Decreased short term memory;Decreased recall of recent events;Decreased recall of precautions   Following Commands Follows one step commands with increased time or repetition   Activity Tolerance   Activity Tolerance Treatment limited secondary to medical complications (Comment)  (cog)   Assessment   Treatment Assessment OT session focusing on ADL routine, mobility trials, UE TE. Pt tolerated session well, remains disoriented to basic biographical information, place and time.  Presenting as quiet, flat, but agreeable to session. OT to continue POC at this time. "   Prognosis Fair   Problem List Decreased endurance;Impaired balance;Decreased mobility;Decreased coordination;Decreased cognition;Impaired judgement;Decreased safety awareness   Plan   Treatment/Interventions ADL retraining;Functional transfer training;Therapeutic exercise;Endurance training;Cognitive reorientation;Patient/family training;Equipment eval/education;Compensatory technique education;Continued evaluation   Progress Progressing toward goals   OT Therapy Minutes   OT Time In 0700   OT Time Out 0830   OT Total Time (minutes) 90   OT Mode of treatment - Individual (minutes) 90   OT Mode of treatment - Concurrent (minutes) 0   OT Mode of treatment - Group (minutes) 0   OT Mode of treatment - Co-treat (minutes) 0   OT Mode of Treatment - Total time(minutes) 90 minutes   OT Cumulative Minutes 1195   Therapy Time missed   Time missed? No

## 2025-05-26 NOTE — PROGRESS NOTES
"Progress Note - Hospitalist   Name: Alexis Dennison 64 y.o. male I MRN: [id number]  Unit/Bed#: -01 I Date of Admission: 5/7/2025   Date of Service: 5/26/2025 I Hospital Day: 19    Assessment & Plan  Primary central nervous system (CNS) lymphoma  Patient with development of worsening confusion, and was seen in the ED on 3/24/2025.  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  Lymphoma/leukemia panel was nondiagnostic  CTH 4/3/25: concerning for worsening hydrocephalus.   Repeat LP 4/7/25 = undiagnostic again   MRI brain 4/11/25: \"Interval enlargement of the dominant left anterior basal ganglionic mass lesion with greater surrounding vasogenic edema and mass effect. 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  S/p EVD, self removed 4/26  S/p Keppra 500mg BID x 7 days for postoperative seizure ppx   Started on chemotherapy during hospital stay and is for weekly Rituximab and Methotrexate every 2 weeks  S/p Dexamethasone taper = LD was on 5/19/25  Maintain PICC line  Had Rituxan 5/10/25, 5/23/25  Did get Filgrastim 5/17 and 5/18  Was due for Methotrexate on 5/17 but + sepsis w/bacteremia.   ID cleared pt to get chemo and he did get the Methotrexate 5/21/25 and Decadron 10 mg IV x 1, Zofran 16mg IV  Renal was maintaining bicarb drip and pt getting q6hr UAs for urine pH --> bicarb drip now stopped 5/23 and then switched to Isolyte @75ml/hr. Currently on NS at 75/hr "   Getting Leucovorin q6hrs x 6 doses   To get Methotrexate levels until <0.10. was 0.4 5/23/25 and 0.2 5/25/25. Lab drawn 5/25 still pending   For daily CBC, CMP, methotrexate level  Pt has an appt with Dr Phan on 6/3/25   Type 2 diabetes mellitus with hyperglycemia, without long-term current use of insulin (HCC)  Hemoglobin A1C was 6.6 on 2/22/25  Sees Endo [facility] in [place]  Home:  Janumet XR  qd  Here: Januvia 50mg qd  Continue DM diet  On QID Accuchecks with SSI Algo 1  BSs have continued to improve with steroid being tapered  = LD was 5/19 5/17 creat bumped up to 1.38 so Metformin was placed on hold  Since creatinine was stable, restarted Metformin 1000 mg XR 5/22/25. (Last contrast study was 5/18/25).    Stopped the Lispro WM 5/21/25.    5/26- Creatinine is 1.32 today- will hold metformin again starting in AM. Continue SSI  Mixed hyperlipidemia  Continue atorvastatin  Did have slightly elevated LFT's on prior labs but most recently normal, consider holding statin if LFTs trend back up  Thyroid nodule  TSH/free T4 4/21/25 = 0.019/1.13  Repeat TSH/free T4 done 5/12 = TSH was 0.287 with free T4 0.85  TSH has normalized 5/21.  D/W Endo = [place] -->  plan for OP US.  Pulmonary nodule  CT chest 3 months follow-up  Liver lesion  Patient will require outpatient follow-up  Encephalopathy  Continue Seroquel  S/p steroid taper- last dose was 5/19/25  On 1:1  LETY (acute kidney injury) (HCC)  Monitor status post methotrexate  Peak creat was 2.77 on 4/22/25  Previous baseline as OP 5/2023-2/22/25 was 1.1 to 1.0  CMP 5/9/25 with creatinine of 1.18 down from 1.23 on 5/8 and on 5/10 was 1.15  CMP on 5/12/25 showed creatinine stable at 1.1  5/17: creat up again to 1.38 = Being followed by Nephrology. Met sepsis criteria 5/17 and placed on IVF ordered as per sepsis protocol  5/19: creat trending down and was 1.20 -->  IVF was  reduced to 50ml/hr per renal.  On 5/20/25-5/21/25, they want to  "continue same IVF  Creatinine 5/23 is 1.24  Cr today back up to 1.32  Renal made aware. IVF rate increased today from 50/hr to 75/hr. Metformin placed on hold again. Recheck labs in AM  Sepsis (HCC)  Patient noted to have hypotension and tachycardia on 5/17  Sepsis workup ordered and found to have positive blood cultures again, growing E coli as before  ID reconsulted  Started Cefepime 5/17/25 = await blood urine cx results but preliminary results of urine >100,000 GNR and prelim blood cx = GNR with blood cx ID panel positive for E.coli  Continue IVF until methotrexate level < 0.1  He has had stable BP, tachycardia has resolved and he has had no fevers  Updated plan from ID as below  Recurrent E. coli bacteremia  Patient completed 7 days of IV cefazolin which was finished on May 5  CT of A/P was done 5/18 per recommendation from ID looking for possible reason for recurrent bacteremia = was unrevealing  Was on Ceftriaxone --> ID changed to Duricef to continue through 5/30/25 for a 14 days total of antibiotic tx  HTN (hypertension)  Home:  Losartan -25 qd  Here:  no meds   Stable BP today  Transaminitis  AST is 111, previously 114,  ALT is 322 previously 149  D/w H-O, they are aware =  \"Could be due to recent chemotherapy versus antibiotics versus liver lesions\".    CMP 5/9/25 = AST 57 (previously 111),  (previously 322)  Continue daily CMP per oncology  Leukopenia  resolved  Hyponatremia  resolved  Thrombocytopenia (HCC)  Per H-O  Platelets dropped to 66 on 5/18  On 5/19 was up to 79 --> 5/21 up to 144, 5/22 was 145 and today 207  For platelet transfusion if counts drop below 20K or in setting of any spontaneous bleeding   Anemia  Hgb dropped to 6.3 on 5/18 and was confirmed by repeat lab  at 6.7 so was transfused with one unit of PRBCs after discussion with Oncology  Spoke with wife over the phone and consent obtained  On 5/19/25, hemoglobin was up to 8.0 = appropriate response to transfusion  Hgb " 9.6  Ankle edema, bilateral  Mild  TEDS    The above assessment and plan was reviewed and updated as determined by my evaluation of the patient on 5/26/2025.    History of Present Illness   Patient seen and examined. Patients overnight issues or events were reviewed with nursing staff. New or overnight issues include the following:   Patient sitting up in chair. Remains on 1:1. Patient denies pain. VSS    Review of Systems    Objective :  Temp:  [97.5 °F (36.4 °C)-98.2 °F (36.8 °C)] 98.2 °F (36.8 °C)  HR:  [92-94] 92  BP: (110-121)/(77-82) 120/77  Resp:  [17-18] 17  SpO2:  [97 %] 97 %  O2 Device: None (Room air)    Invasive Devices       Peripherally Inserted Central Catheter Line  Duration             PICC Line 05/01/25 Left Brachial 25 days                    Physical Exam  General Appearance: NAD; pleasant  HEENT: PERRLA, conjuctiva normal; mucous membranes moist; face symmetrical  Neck:  Supple  Lungs: clear bilaterally, normal respiratory effort, no retractions, expiratory effort normal, on room air  CV: regular rate and rhythm, no murmurs rubs or gallops noted   ABD: soft non tender, +BS x4  EXT: DP pulses intact, no lymphadenopathy  Skin: normal turgor, normal texture, no rash  Psych: affect normal, mood normal  Neuro: awake and alert, confused  The above physical exam was reviewed and updated as determined by my evaluation of the patient on 5/26/2025.      Lab Results: I have reviewed the following results:  Results from last 7 days   Lab Units 05/26/25  0455 05/25/25  0544   WBC Thousand/uL 5.38 5.11   HEMOGLOBIN g/dL 9.6* 9.3*   HEMATOCRIT % 28.6* 29.0*   PLATELETS Thousands/uL 207 177     Results from last 7 days   Lab Units 05/26/25  0455 05/25/25  0544   SODIUM mmol/L 143 139   POTASSIUM mmol/L 4.3 3.8   CHLORIDE mmol/L 105 102   CO2 mmol/L 28 30   BUN mg/dL 28* 23   CREATININE mg/dL 1.32* 1.21   CALCIUM mg/dL 9.3 9.5             Results from last 7 days   Lab Units 05/26/25  1135 05/26/25  0619  05/25/25 2025   POC GLUCOSE mg/dl 156* 124 137         Review of Scheduled Meds: Medications  reviewed and reconciled as needed  Current Facility-Administered Medications   Medication Dose Route Frequency Provider Last Rate    acetaminophen  650 mg Oral Q6H PRN Crispin Arana MD      allopurinol  300 mg Oral Daily Crispin Arana MD      alteplase  2 mg Intracatheter Q1MIN PRN Crispin Arana MD      alteplase  2 mg Intracatheter Q1MIN PRN Magali Lee MD      alteplase  2 mg Intracatheter Q1MIN PRN Satnam Kebede MD      alteplase  2 mg Intracatheter Q1MIN PRN Satnam Kebede MD      alteplase  2 mg Intracatheter Q1MIN PRN Satnam Kebede MD      aluminum-magnesium hydroxide-simethicone  30 mL Oral Q4H PRN Crispin Arana MD      atorvastatin  20 mg Oral Daily Crispin Arana MD      bisacodyl  10 mg Rectal Daily PRN Skyler Hendrickson MD      calcium carbonate  1,000 mg Oral Daily PRN Crispin Arana MD      cefadroxil  1,000 mg Oral Q12H [facility] Lisa Singh MD      chlorhexidine  15 mL Mouth/Throat Q12H [facility] Crispin Arana MD      docusate sodium  100 mg Oral BID Jessica Arreola DO      heparin (porcine)  5,000 Units Subcutaneous Q8H [facility] Crispin Arana MD      insulin lispro  1-5 Units Subcutaneous TID AC PATI [place]      insulin lispro  1-5 Units Subcutaneous HS PATI [place]      lactulose  20 g Oral Daily PRN Joshua Kaminski DO      leucovorin  25 mg Oral Q6H Nicki Renteria MD      melatonin  6 mg Oral HS Crispin Araan MD      [Held by provider] metFORMIN  1,000 mg Oral Daily PATI [place]      OLANZapine  5 mg Intramuscular BID PRN Crispin Arana MD      ondansetron  4 mg Intravenous Q6H PRN Crispin Arana MD      oxyCODONE  2.5 mg Oral Q4H PRN Crsipin Arana MD      Or    oxyCODONE  5 mg Oral Q4H PRN Crispin Arana MD      pantoprazole  40 mg Oral Early Morning Crispin Arana MD      polyethylene glycol  17 g Oral Daily PRN Skyler Hendrickson MD      QUEtiapine  12.5 mg Oral  Daily Crispin Arana MD      QUEtiapine  50 mg Oral HS Crispin Arana MD      senna  2 tablet Oral Daily With Lunch Jessica Arreola,       sitaGLIPtin  50 mg Oral Daily PATI [place]      sodium chloride  20 mL/hr Intravenous Once PRN Satnam Kebede MD      sodium chloride  20 mL/hr Intravenous Once PRN Satnma Kebede MD      sodium chloride  75 mL/hr Intravenous Continuous Jesenia Buenrostro PA-C 75 mL/hr (05/26/25 8652)       VTE Pharmacologic Prophylaxis: heparin  Code Status: Level 1 - Full Code  Current Length of Stay: 19 day(s)    Administrative Statements   Discussed with oncology and nephrology providers today      ** Please Note:  voice to text software may have been used in the creation of this document. Although proof errors in transcription or interpretation are a potential of such software**

## 2025-05-26 NOTE — PLAN OF CARE
Problem: PAIN - ADULT  Goal: Verbalizes/displays adequate comfort level or baseline comfort level  Description: Interventions:- Encourage patient to monitor pain and request assistance- Assess pain using appropriate pain scale- Administer analgesics based on type and severity of pain and evaluate response- Implement non-pharmacological measures as appropriate and evaluate response- Consider cultural and social influences on pain and pain management- Notify physician/advanced practitioner if interventions unsuccessful or patient reports new pain  Outcome: Progressing     Problem: INFECTION - ADULT  Goal: Absence or prevention of progression during hospitalization  Description: INTERVENTIONS:- Assess and monitor for signs and symptoms of infection- Monitor lab/diagnostic results- Monitor all insertion sites, i.e. indwelling lines, tubes, and drains- Monitor endotracheal if appropriate and nasal secretions for changes in amount and color- [place] appropriate cooling/warming therapies per order- Administer medications as ordered- Instruct and encourage patient and family to use good hand hygiene technique- Identify and instruct in appropriate isolation precautions for identified infection/condition  Outcome: Progressing     Problem: SAFETY ADULT  Goal: Patient will remain free of falls  Description: INTERVENTIONS:- Educate patient/family on patient safety including physical limitations- Instruct patient to call for assistance with activity - Consult OT/PT to assist with strengthening/mobility - Keep Call bell within reach- Keep bed low and locked with side rails adjusted as appropriate- Keep care items and personal belongings within reach- Initiate and maintain comfort rounds- Make Fall Risk Sign visible to staff- Offer Toileting every 2 Hours, in advance of need- Initiate/Maintain bed alarm- Obtain necessary fall risk management equipment:  - Apply yellow socks and bracelet for high fall risk patients- Consider  moving patient to room near nurses station  Outcome: Progressing  Goal: Maintain or return to baseline ADL function  Description: INTERVENTIONS:-  Assess patient's ability to carry out ADLs; assess patient's baseline for ADL function and identify physical deficits which impact ability to perform ADLs (bathing, care of mouth/teeth, toileting, grooming, dressing, etc.)- Assess/evaluate cause of self-care deficits - Assess range of motion- Assess patient's mobility; develop plan if impaired- Assess patient's need for assistive devices and provide as appropriate- Encourage maximum independence but intervene and supervise when necessary- Involve family in performance of ADLs- Assess for home care needs following discharge - Consider OT consult to assist with ADL evaluation and planning for discharge- Provide patient education as appropriate  Outcome: Progressing  Goal: Maintains/Returns to pre admission functional level  Description: INTERVENTIONS:- Perform AM-PAC 6 Click Basic Mobility/ Daily Activity assessment daily.- Set and communicate daily mobility goal to care team and patient/family/caregiver. - Collaborate with rehabilitation services on mobility goals if consulted- Perform Range of Motion 3 times a day.- Reposition patient every 2 hours.- Dangle patient 3 times a day- Stand patient 3 times a day- Ambulate patient 3 times a day- Out of bed to chair for meals  Outcome: Progressing     Problem: DISCHARGE PLANNING  Goal: Discharge to home or other facility with appropriate resources  Description: INTERVENTIONS:- Identify barriers to discharge w/patient and caregiver- Arrange for needed discharge resources and transportation as appropriate- Identify discharge learning needs (meds, wound care, etc.)- Arrange for interpretive services to assist at discharge as needed- Refer to Case Management Department for coordinating discharge planning if the patient needs post-hospital services based on physician/advanced  practitioner order or complex needs related to functional status, cognitive ability, or social support system  Outcome: Progressing     Problem: Prexisting or High Potential for Compromised Skin Integrity  Goal: Skin integrity is maintained or improved  Description: INTERVENTIONS:- Identify patients at risk for skin breakdown- Assess and monitor skin integrity- Assess and monitor nutrition and hydration status- Monitor labs - Assess for incontinence - Turn and reposition patient- Assist with mobility/ambulation- Relieve pressure over bony prominences- Avoid friction and shearing- Provide appropriate hygiene as needed including keeping skin clean and dry- Evaluate need for skin moisturizer/barrier cream- Collaborate with interdisciplinary team - Patient/family teaching- Consider wound care consult   Outcome: Progressing

## 2025-05-26 NOTE — ASSESSMENT & PLAN NOTE
"AST is 111, previously 114,  ALT is 322 previously 149  D/w H-O, they are aware =  \"Could be due to recent chemotherapy versus antibiotics versus liver lesions\".    CMP 5/9/25 = AST 57 (previously 111),  (previously 322)  Continue daily CMP per oncology  "

## 2025-05-26 NOTE — PROGRESS NOTES
Progress Note - Oncology-Medical   Name: Alexis Dennison 64 y.o. male I MRN: [id number]  Unit/Bed#: -01 I Date of Admission: 5/7/2025   Date of Service: 5/26/2025 I Hospital Day: 19     Assessment & Plan  Primary central nervous system (CNS) lymphoma  Patient's wife is in the room.    64 yoM with PMHx of DM2, NAFLD, and HT who presented with progressive encephalopathy found to have multiple scattered nodular cerebral and cerebellar enhancement who underwent two non-diagnostic LPs prompting stereotactic brain biopsy (4/14) which showed large B-cell lymphoma.  Patient received third cycle of high-dose methotrexate on 5/21/2025.  He is receiving leucovorin.    Patient received Rituxan on 5/23/2025.     Treatment Plan: regimen modified from (https://pubmed.ncbi.nlm.nih.gov/49227233/)     Biweekly HD-MTX/rituximab (8 g/m(2)/dose; 375 mg/m(2)/dose) for 4-6 cycles (induction) following with every 4 weeks HD-MTX (8 g/m(2)/dose) for 4 cycles (maintenance).     C1 (4/17) 8 g/m²  C2 (5/2) 3 g/m², dose-reduced due to LETY, delayed x1 day due to E. coli bacteremia.  C3 (was planned for 5/17) delayed due to E. coli bacteremia, s/p MTX 6 g/m2 on 5/21.  Recommendations:  Urine pH remained above 7.0.  24-hour post administration methotrexate 5.0, patient started on IV leucovorin, methotrexate level 48 hours came back at 0.4, leucovorin is transition to oral, continue same and have extended to 6 additional dose.    Continue to monitor methotrexate level, 5.10-->.40--> 0.25, levels from 5.25.25 pending.  Continue oral leucovorin.  Continue to monitor methotrexate level daily and urine pH until levels are less than 0.10.  Status post Rituxan on 5/23/2025. Mild transaminitis noted, no dose adjustment required for Rituxan.  Creatinine slightly up trended to 1.32 this morning, patient remains on IV fluid at 50 cc/h, nephrology following. Rec increasing rate to 75cc/hr  Continue to monitor CBC and CMP daily until methotrexate levels  cleared.    Methotrexate level              Component  Ref Range & Units (hover) 5/24/25 1521 5/23/25 1748 5/22/25 1628       Methotrexate Lvl 0.25 0.40 5.10            Thyroid nodule  Patient incidentally found to have thyroid nodule, was recommended ultrasound thyroid.  Pulmonary nodule  CTA from 3/29/2025 with nonspecific 4 mm right lower lobe pulmonary nodule, recommended 3 months follow-up.  Transaminitis  Likely secondary to methotrexate. Continue to monitor.   Thrombocytopenia (HCC)  Counts are being monitored.  Anemia  Likely multifactorial.  Hemoglobin is stable at 9.3  Will continue to monitor.  Ankle edema, bilateral  Mild pedal edema noted, no calf tenderness, likely in setting of IV fluids, however will need to continue IV fluids depending upon methotrexate level    Outpatient follow up plan: Outpatient appointment rescheduled for 6/3/2025.    Communication with patient/family:  I did update the patient.    Communication with team:  Did communicate with  primary team.    I did review this patient with Dr. Roberson and they are in agreement with this plan.          Subjective:  Patient seen at bedside, this morning had reported abdominal pain that improved with bowel movement.  Working with physical therapy.    Review of Systems   Constitutional:  Negative for appetite change, fatigue and unexpected weight change.   Respiratory:  Negative for chest tightness and shortness of breath.    Cardiovascular:  Negative for chest pain and palpitations.   Gastrointestinal:  Positive for abdominal pain. Negative for constipation and vomiting.   Genitourinary:  Negative for enuresis and frequency.   Musculoskeletal:  Negative for arthralgias and joint swelling.   Skin:  Negative for color change and rash.   Neurological:  Negative for dizziness and facial asymmetry.   All other systems reviewed and are negative.         Objective:     Medication Administration - last 24 hours from 05/25/2025 1217 to 05/26/2025 1217          Date/Time Order Dose Route Action Action by     05/26/2025 0902 EDT atorvastatin (LIPITOR) tablet 20 mg 20 mg Oral Given Ketan Rehman RN     05/26/2025 1020 EDT aluminum-magnesium hydroxide-simethicone (MAALOX) oral suspension 30 mL 30 mL Oral Given Ketan Rehman RN     05/26/2025 0902 EDT chlorhexidine (PERIDEX) 0.12 % oral rinse 15 mL 15 mL Mouth/Throat Not Given Ketan Rehman RN     05/25/2025 2046 EDT chlorhexidine (PERIDEX) 0.12 % oral rinse 15 mL 15 mL Mouth/Throat Given Vinita Dover RN     05/26/2025 0532 EDT pantoprazole (PROTONIX) EC tablet 40 mg 40 mg Oral Given Vinita Dover RN     05/26/2025 0902 EDT allopurinol (ZYLOPRIM) tablet 300 mg 300 mg Oral Given Ketan Rehman RN     05/26/2025 0532 EDT heparin (porcine) subcutaneous injection 5,000 Units 5,000 Units Subcutaneous Given Vinita Dover RN     05/25/2025 2107 EDT heparin (porcine) subcutaneous injection 5,000 Units 5,000 Units Subcutaneous Given Vinita Dover RN     05/25/2025 1323 EDT heparin (porcine) subcutaneous injection 5,000 Units 5,000 Units Subcutaneous Given Ketan Rehman RN     05/25/2025 2045 EDT melatonin tablet 6 mg 6 mg Oral Given Vinita Dover RN     05/25/2025 2107 EDT QUEtiapine (SEROquel) tablet 50 mg 50 mg Oral Given Vinita Dover RN     05/26/2025 0902 EDT docusate sodium (COLACE) capsule 100 mg 100 mg Oral Given Ketan Rehman RN     05/25/2025 1738 EDT docusate sodium (COLACE) capsule 100 mg 100 mg Oral Given Ketan Rehman RN     05/26/2025 0902 EDT sitaGLIPtin (JANUVIA) tablet 50 mg 50 mg Oral Given Ketan Rehman RN     05/26/2025 0636 EDT insulin lispro (HumALOG/ADMELOG) 100 units/mL subcutaneous injection 1-5 Units -- Subcutaneous Not Given Vinita Dover RN     05/25/2025 1608 EDT insulin lispro (HumALOG/ADMELOG) 100 units/mL subcutaneous injection 1-5 Units -- Subcutaneous Not Given Ketan Rehman RN     05/25/2025 2107 EDT insulin lispro (HumALOG/ADMELOG) 100 units/mL subcutaneous injection 1-5 Units  "-- Subcutaneous Not Given Vinita Dover RN     05/26/2025 0903 EDT cefadroxil (DURICEF) capsule 1,000 mg 1,000 mg Oral Given Ketan Rehman RN     05/25/2025 2050 EDT cefadroxil (DURICEF) capsule 1,000 mg 1,000 mg Oral Given Vinita Dover RN     05/26/2025 0903 EDT metFORMIN (GLUCOPHAGE-XR) 24 hr tablet 1,000 mg 1,000 mg Oral Given Ketan Rehman RN     05/26/2025 0649 EDT sodium chloride 0.9 % infusion 50 mL/hr Intravenous Rate/Dose Verify Vinita Dover RN     05/25/2025 2319 EDT sodium chloride 0.9 % infusion 50 mL/hr Intravenous Rate/Dose Verify Vinita Dover RN     05/25/2025 1324 EDT sodium chloride 0.9 % infusion 50 mL/hr Intravenous New Bag Ketan Rehman RN     05/26/2025 0903 EDT leucovorin (WELLCOVORIN) tablet 25 mg 25 mg Oral Given Ketan Rehman RN            /82 (BP Location: Right arm)   Pulse 94   Temp 97.5 °F (36.4 °C) (Oral)   Resp 17   Ht 5' 10\" (1.778 m)   Wt 76 kg (167 lb 8.8 oz)   SpO2 97%   BMI 24.04 kg/m²       Physical Exam  Constitutional:       Appearance: Normal appearance.   HENT:      Head: Normocephalic and atraumatic.     Cardiovascular:      Rate and Rhythm: Normal rate and regular rhythm.      Pulses: Normal pulses.      Heart sounds: Normal heart sounds.   Pulmonary:      Effort: Pulmonary effort is normal.      Breath sounds: Normal breath sounds.   Abdominal:      General: Abdomen is flat.      Palpations: Abdomen is soft.      Tenderness: There is no abdominal tenderness.     Skin:     General: Skin is warm and dry.     Neurological:      Mental Status: He is alert.       Recent Results (from the past 48 hours)   Methotrexate level    Collection Time: 05/24/25  3:21 PM   Result Value Ref Range    Methotrexate Lvl 0.25 umol/L   Fingerstick Glucose (POCT)    Collection Time: 05/24/25  3:36 PM   Result Value Ref Range    POC Glucose 105 65 - 140 mg/dl   Fingerstick Glucose (POCT)    Collection Time: 05/24/25  8:54 PM   Result Value Ref Range    POC Glucose 131 65 - " 140 mg/dl   CBC and differential    Collection Time: 05/25/25  5:44 AM   Result Value Ref Range    WBC 5.11 4.31 - 10.16 Thousand/uL    RBC 3.10 (L) 3.88 - 5.62 Million/uL    Hemoglobin 9.3 (L) 12.0 - 17.0 g/dL    Hematocrit 29.0 (L) 36.5 - 49.3 %    MCV 94 82 - 98 fL    MCH 30.0 26.8 - 34.3 pg    MCHC 32.1 31.4 - 37.4 g/dL    RDW 14.9 11.6 - 15.1 %    MPV 10.6 8.9 - 12.7 fL    Platelets 177 149 - 390 Thousands/uL    nRBC 0 /100 WBCs    Segmented % 55 43 - 75 %    Immature Grans % 2 0 - 2 %    Lymphocytes % 41 14 - 44 %    Monocytes % 1 (L) 4 - 12 %    Eosinophils Relative 1 0 - 6 %    Basophils Relative 0 0 - 1 %    Absolute Neutrophils 2.79 1.85 - 7.62 Thousands/µL    Absolute Immature Grans 0.08 0.00 - 0.20 Thousand/uL    Absolute Lymphocytes 2.09 0.60 - 4.47 Thousands/µL    Absolute Monocytes 0.07 (L) 0.17 - 1.22 Thousand/µL    Eosinophils Absolute 0.06 0.00 - 0.61 Thousand/µL    Basophils Absolute 0.02 0.00 - 0.10 Thousands/µL   Comprehensive metabolic panel    Collection Time: 05/25/25  5:44 AM   Result Value Ref Range    Sodium 139 135 - 147 mmol/L    Potassium 3.8 3.5 - 5.3 mmol/L    Chloride 102 96 - 108 mmol/L    CO2 30 21 - 32 mmol/L    ANION GAP 7 4 - 13 mmol/L    BUN 23 5 - 25 mg/dL    Creatinine 1.21 0.60 - 1.30 mg/dL    Glucose 105 65 - 140 mg/dL    Glucose, Fasting 105 (H) 65 - 99 mg/dL    Calcium 9.5 8.4 - 10.2 mg/dL    AST 90 (H) 13 - 39 U/L     (H) 7 - 52 U/L    Alkaline Phosphatase 76 34 - 104 U/L    Total Protein 5.9 (L) 6.4 - 8.4 g/dL    Albumin 3.6 3.5 - 5.0 g/dL    Total Bilirubin 0.61 0.20 - 1.00 mg/dL    eGFR 62 ml/min/1.73sq m   Fingerstick Glucose (POCT)    Collection Time: 05/25/25  6:18 AM   Result Value Ref Range    POC Glucose 106 65 - 140 mg/dl   Fingerstick Glucose (POCT)    Collection Time: 05/25/25 10:44 AM   Result Value Ref Range    POC Glucose 236 (H) 65 - 140 mg/dl   Fingerstick Glucose (POCT)    Collection Time: 05/25/25  3:39 PM   Result Value Ref Range    POC Glucose  136 65 - 140 mg/dl   Fingerstick Glucose (POCT)    Collection Time: 05/25/25  8:25 PM   Result Value Ref Range    POC Glucose 137 65 - 140 mg/dl   CBC and differential    Collection Time: 05/26/25  4:55 AM   Result Value Ref Range    WBC 5.38 4.31 - 10.16 Thousand/uL    RBC 3.08 (L) 3.88 - 5.62 Million/uL    Hemoglobin 9.6 (L) 12.0 - 17.0 g/dL    Hematocrit 28.6 (L) 36.5 - 49.3 %    MCV 93 82 - 98 fL    MCH 31.2 26.8 - 34.3 pg    MCHC 33.6 31.4 - 37.4 g/dL    RDW 14.7 11.6 - 15.1 %    MPV 10.1 8.9 - 12.7 fL    Platelets 207 149 - 390 Thousands/uL   Comprehensive metabolic panel    Collection Time: 05/26/25  4:55 AM   Result Value Ref Range    Sodium 143 135 - 147 mmol/L    Potassium 4.3 3.5 - 5.3 mmol/L    Chloride 105 96 - 108 mmol/L    CO2 28 21 - 32 mmol/L    ANION GAP 10 4 - 13 mmol/L    BUN 28 (H) 5 - 25 mg/dL    Creatinine 1.32 (H) 0.60 - 1.30 mg/dL    Glucose 125 65 - 140 mg/dL    Glucose, Fasting 125 (H) 65 - 99 mg/dL    Calcium 9.3 8.4 - 10.2 mg/dL    AST 52 (H) 13 - 39 U/L     (H) 7 - 52 U/L    Alkaline Phosphatase 73 34 - 104 U/L    Total Protein 5.9 (L) 6.4 - 8.4 g/dL    Albumin 3.7 3.5 - 5.0 g/dL    Total Bilirubin 0.70 0.20 - 1.00 mg/dL    eGFR 56 ml/min/1.73sq m   Manual Differential(PHLEBS Do Not Order)    Collection Time: 05/26/25  4:55 AM   Result Value Ref Range    Segmented % 46 43 - 75 %    Lymphocytes % 46 (H) 14 - 44 %    Monocytes % 0 (L) 4 - 12 %    Eosinophils % 0 0 - 6 %    Basophils % 1 0 - 1 %    Atypical Lymphocytes % 7 (H) <=0 %    Absolute Neutrophils 2.47 1.85 - 7.62 Thousand/uL    Absolute Lymphocytes 2.85 0.60 - 4.47 Thousand/uL    Absolute Monocytes 0.00 0.00 - 1.22 Thousand/uL    Absolute Eosinophils 0.00 0.00 - 0.40 Thousand/uL    Absolute Basophils 0.05 0.00 - 0.10 Thousand/uL    Total Counted      RBC Morphology Present     Platelet Estimate Adequate Adequate    Anisocytosis Present     Polychromasia Present    Fingerstick Glucose (POCT)    Collection Time: 05/26/25   6:19 AM   Result Value Ref Range    POC Glucose 124 65 - 140 mg/dl   Fingerstick Glucose (POCT)    Collection Time: 05/26/25 11:35 AM   Result Value Ref Range    POC Glucose 156 (H) 65 - 140 mg/dl       CT abdomen pelvis w contrast  Result Date: 5/19/2025  Narrative: CT ABDOMEN AND PELVIS WITH IV CONTRAST INDICATION: recurrent bacteremia, look for source. Blood culture and urine culture positive for gram-negative rods. COMPARISON: April 30, 2025. TECHNIQUE: CT examination of the abdomen and pelvis was performed. Multiplanar 2D reformatted images were created from the source data. This examination, like all CT scans performed in the [facility], was performed utilizing techniques to minimize radiation dose exposure, including the use of iterative reconstruction and automated exposure control. Radiation dose length product (DLP) for this visit: 390.85 mGy-cm. IV Contrast: 85 mL of iohexol (OMNIPAQUE) Enteric Contrast: Not administered. FINDINGS: ABDOMEN LOWER CHEST: A small calcified granuloma is redemonstrated anteriorly at the right lung base. There is coronary artery disease. A catheter terminates in the right atrium. LIVER/BILIARY TREE: There is a 13 x 9 mm cyst posteriorly in the right lobe of the liver, similar to the prior study. GALLBLADDER: No calcified gallstones. No pericholecystic inflammatory change. SPLEEN: Unremarkable. PANCREAS: Unremarkable. ADRENAL GLANDS: Unremarkable. KIDNEYS/URETERS: Mild bilateral nonspecific perinephric stranding, increased somewhat compared with April 30, 2025. No hydronephrosis bilaterally. STOMACH AND BOWEL: No bowel obstruction. No pericolonic inflammatory change. APPENDIX: Normal. ABDOMINOPELVIC CAVITY: There is a small amount of free fluid, best demonstrated in the right lower quadrant and paracolic gutters. No pneumoperitoneum. VESSELS: Atherosclerosis. No abdominal aortic aneurysm. PELVIS REPRODUCTIVE ORGANS: Unremarkable for patient's age. URINARY  BLADDER: Unremarkable. ABDOMINAL WALL/INGUINAL REGIONS: There is some gas within the subcutaneous fat of the anterior abdominal walls near the level of the umbilicus, left more so than right, possibly related to recent injections. Small fat-containing left inguinal hernia. BONES: No acute fracture or suspicious osseous lesion. Spinal degenerative changes, most notably at L5-S1.     Impression: There is mild bilateral nonspecific perinephric stranding, however, this does appear somewhat increased compared with April 30, 2025. Correlation with urinalysis and urine culture and sensitivity is recommended. There is a small amount of free fluid. Other nonemergent findings as above. Workstation performed: [id number]     CT abdomen pelvis wo contrast  Result Date: 4/30/2025  Narrative: CT ABDOMEN AND PELVIS WITHOUT IV CONTRAST INDICATION: Gram-negative bacteremia, evaluate for source infection. COMPARISON: None. TECHNIQUE: CT examination of the abdomen and pelvis was performed without intravenous contrast. Multiplanar 2D reformatted images were created from the source data. This examination, like all CT scans performed in the [facility], was performed utilizing techniques to minimize radiation dose exposure, including the use of iterative reconstruction and automated exposure control. Radiation dose length product (DLP) for this visit: 560.94 mGy-cm. Enteric Contrast: Not administered. FINDINGS: ABDOMEN LOWER CHEST: Calcified granuloma in the right middle lobe. LIVER/BILIARY TREE: 1 cm cyst in the right lobe. Liver is otherwise unremarkable.1 GALLBLADDER: No calcified gallstones. No pericholecystic inflammatory change. SPLEEN: Unremarkable. PANCREAS: Unremarkable. ADRENAL GLANDS: Unremarkable. KIDNEYS/URETERS: Unremarkable. No hydronephrosis. STOMACH AND BOWEL: Large amount of formed stool throughout the entire colon. No evidence of obstruction. APPENDIX: Normal. ABDOMINOPELVIC CAVITY: No ascites. No  pneumoperitoneum. No lymphadenopathy. VESSELS: Unremarkable for patient's age. PELVIS REPRODUCTIVE ORGANS: Unremarkable for patient's age. URINARY BLADDER: Unremarkable. ABDOMINAL WALL/INGUINAL REGIONS: Mild infiltration of the anterior abdominal wall in the left lower quadrant, possibly a soft tissue contusion. BONES: No acute fracture or suspicious osseous lesion.     Impression: No acute inflammatory process in the abdomen or pelvis. Large amount of stool throughout the colon. Workstation performed: [id number]     XR abdomen obstruction series  Result Date: 4/30/2025  Narrative: XR ABDOMEN OBSTRUCTION SERIES INDICATION: Constipation, lethargy, gram-negative bacteremia. Confusion, newly diagnosed intracranial mass. Constipation. COMPARISON: MRI abdomen 3/30/2025 and CT chest abdomen pelvis 3/29/2025 FINDINGS: Large volume of colonic stool. Otherwise unremarkable bowel gas pattern. No pneumoperitoneum or abnormal air-fluid levels. No pathologic calcification or soft tissue mass. Bones are unremarkable for patient's age. Examination of the chest reveals clear lungs and a normal cardiomediastinal silhouette. Right PICC with tip projecting over the superior cavoatrial junction.     Impression: 1.  Large colonic stool burden without obstruction or other acute abdominal findings. 2.  No acute cardiopulmonary findings. Resident: CINTHYA FAJARDO I, the attending radiologist, have reviewed the images and agree with the final report above. Workstation performed: [id number]       I have personally reviewed labs, imaging studies, and pertinent reports.      This note has been generated by voice recognition software system.  Therefore, there may be spelling, grammar, and or syntax errors. Please contact if questions arise.

## 2025-05-26 NOTE — ASSESSMENT & PLAN NOTE
"Patient with development of worsening confusion, and was seen in the ED on 3/24/2025.  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  Lymphoma/leukemia panel was nondiagnostic  CTH 4/3/25: concerning for worsening hydrocephalus.   Repeat LP 4/7/25 = undiagnostic again   MRI brain 4/11/25: \"Interval enlargement of the dominant left anterior basal ganglionic mass lesion with greater surrounding vasogenic edema and mass effect. 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  S/p EVD, self removed 4/26  S/p Keppra 500mg BID x 7 days for postoperative seizure ppx   Started on chemotherapy during hospital stay and is for weekly Rituximab and Methotrexate every 2 weeks  S/p Dexamethasone taper = LD was on 5/19/25  Maintain PICC line  Had Rituxan 5/10/25, 5/23/25  Did get Filgrastim 5/17 and 5/18  Was due for Methotrexate on 5/17 but + sepsis w/bacteremia.   ID cleared pt to get chemo and he did get the Methotrexate 5/21/25 and Decadron 10 mg IV x 1, Zofran 16mg IV  Renal was maintaining bicarb drip and pt getting q6hr UAs for urine pH --> bicarb drip now stopped 5/23 and then switched to Isolyte @75ml/hr. Currently on NS at 75/hr   Getting Leucovorin q6hrs x 6 doses   To get Methotrexate levels until <0.10. was 0.4 5/23/25 and 0.2 5/25/25. Lab drawn 5/25 still pending   For daily CBC, CMP, methotrexate level  Pt has an appt with Dr Phan on 6/3/25   "

## 2025-05-27 LAB
ABO GROUP BLD: NORMAL
ALBUMIN SERPL BCG-MCNC: 3.2 G/DL (ref 3.5–5)
ALP SERPL-CCNC: 58 U/L (ref 34–104)
ALT SERPL W P-5'-P-CCNC: 103 U/L (ref 7–52)
ANION GAP SERPL CALCULATED.3IONS-SCNC: 3 MMOL/L (ref 4–13)
AST SERPL W P-5'-P-CCNC: 23 U/L (ref 13–39)
BILIRUB SERPL-MCNC: 0.71 MG/DL (ref 0.2–1)
BLD GP AB SCN SERPL QL: NEGATIVE
BUN SERPL-MCNC: 22 MG/DL (ref 5–25)
CALCIUM ALBUM COR SERPL-MCNC: 9.2 MG/DL (ref 8.3–10.1)
CALCIUM SERPL-MCNC: 8.6 MG/DL (ref 8.4–10.2)
CHLORIDE SERPL-SCNC: 104 MMOL/L (ref 96–108)
CO2 SERPL-SCNC: 32 MMOL/L (ref 21–32)
CREAT SERPL-MCNC: 1.05 MG/DL (ref 0.6–1.3)
ERYTHROCYTE [DISTWIDTH] IN BLOOD BY AUTOMATED COUNT: 14.7 % (ref 11.6–15.1)
GFR SERPL CREATININE-BSD FRML MDRD: 74 ML/MIN/1.73SQ M
GLUCOSE SERPL-MCNC: 100 MG/DL (ref 65–140)
GLUCOSE SERPL-MCNC: 103 MG/DL (ref 65–140)
GLUCOSE SERPL-MCNC: 130 MG/DL (ref 65–140)
GLUCOSE SERPL-MCNC: 138 MG/DL (ref 65–140)
GLUCOSE SERPL-MCNC: 149 MG/DL (ref 65–140)
HCT VFR BLD AUTO: 21.2 % (ref 36.5–49.3)
HCT VFR BLD AUTO: 23.8 % (ref 36.5–49.3)
HGB BLD-MCNC: 6.8 G/DL (ref 12–17)
HGB BLD-MCNC: 7.6 G/DL (ref 12–17)
MCH RBC QN AUTO: 30.4 PG (ref 26.8–34.3)
MCHC RBC AUTO-ENTMCNC: 32.1 G/DL (ref 31.4–37.4)
MCV RBC AUTO: 95 FL (ref 82–98)
MTX SERPL-SCNC: 0.11 UMOL/L
MTX SERPL-SCNC: <0.1 UMOL/L
PLATELET # BLD AUTO: 153 THOUSANDS/UL (ref 149–390)
PMV BLD AUTO: 10.4 FL (ref 8.9–12.7)
POTASSIUM SERPL-SCNC: 3.9 MMOL/L (ref 3.5–5.3)
PROT SERPL-MCNC: 5.2 G/DL (ref 6.4–8.4)
RBC # BLD AUTO: 2.24 MILLION/UL (ref 3.88–5.62)
RH BLD: POSITIVE
SODIUM SERPL-SCNC: 139 MMOL/L (ref 135–147)
SPECIMEN EXPIRATION DATE: NORMAL
WBC # BLD AUTO: 3.21 THOUSAND/UL (ref 4.31–10.16)

## 2025-05-27 PROCEDURE — 82948 REAGENT STRIP/BLOOD GLUCOSE: CPT

## 2025-05-27 PROCEDURE — 97110 THERAPEUTIC EXERCISES: CPT

## 2025-05-27 PROCEDURE — 97129 THER IVNTJ 1ST 15 MIN: CPT

## 2025-05-27 PROCEDURE — 86923 COMPATIBILITY TEST ELECTRIC: CPT

## 2025-05-27 PROCEDURE — 85027 COMPLETE CBC AUTOMATED: CPT | Performed by: PHYSICIAN ASSISTANT

## 2025-05-27 PROCEDURE — 80053 COMPREHEN METABOLIC PANEL: CPT | Performed by: PHYSICIAN ASSISTANT

## 2025-05-27 PROCEDURE — 85014 HEMATOCRIT: CPT | Performed by: STUDENT IN AN ORGANIZED HEALTH CARE EDUCATION/TRAINING PROGRAM

## 2025-05-27 PROCEDURE — 97530 THERAPEUTIC ACTIVITIES: CPT

## 2025-05-27 PROCEDURE — 86900 BLOOD TYPING SEROLOGIC ABO: CPT | Performed by: NURSE PRACTITIONER

## 2025-05-27 PROCEDURE — P9040 RBC LEUKOREDUCED IRRADIATED: HCPCS

## 2025-05-27 PROCEDURE — 30233N1 TRANSFUSION OF NONAUTOLOGOUS RED BLOOD CELLS INTO PERIPHERAL VEIN, PERCUTANEOUS APPROACH: ICD-10-PCS | Performed by: NURSE PRACTITIONER

## 2025-05-27 PROCEDURE — 85018 HEMOGLOBIN: CPT | Performed by: STUDENT IN AN ORGANIZED HEALTH CARE EDUCATION/TRAINING PROGRAM

## 2025-05-27 PROCEDURE — 86850 RBC ANTIBODY SCREEN: CPT | Performed by: NURSE PRACTITIONER

## 2025-05-27 PROCEDURE — 99232 SBSQ HOSP IP/OBS MODERATE 35: CPT | Performed by: INTERNAL MEDICINE

## 2025-05-27 PROCEDURE — 97130 THER IVNTJ EA ADDL 15 MIN: CPT

## 2025-05-27 PROCEDURE — 99232 SBSQ HOSP IP/OBS MODERATE 35: CPT | Performed by: NURSE PRACTITIONER

## 2025-05-27 PROCEDURE — 97535 SELF CARE MNGMENT TRAINING: CPT

## 2025-05-27 PROCEDURE — 86901 BLOOD TYPING SEROLOGIC RH(D): CPT | Performed by: NURSE PRACTITIONER

## 2025-05-27 PROCEDURE — 99232 SBSQ HOSP IP/OBS MODERATE 35: CPT | Performed by: STUDENT IN AN ORGANIZED HEALTH CARE EDUCATION/TRAINING PROGRAM

## 2025-05-27 PROCEDURE — 97116 GAIT TRAINING THERAPY: CPT

## 2025-05-27 RX ORDER — QUETIAPINE FUMARATE 25 MG/1
12.5 TABLET, FILM COATED ORAL 2 TIMES DAILY
Qty: 30 TABLET | Refills: 0 | Status: SHIPPED | OUTPATIENT
Start: 2025-05-27

## 2025-05-27 RX ORDER — QUETIAPINE FUMARATE 25 MG/1
12.5 TABLET, FILM COATED ORAL
Status: DISCONTINUED | OUTPATIENT
Start: 2025-05-27 | End: 2025-05-28 | Stop reason: HOSPADM

## 2025-05-27 RX ORDER — ACETAMINOPHEN 325 MG/1
650 TABLET ORAL EVERY 6 HOURS PRN
Start: 2025-05-27

## 2025-05-27 RX ORDER — QUETIAPINE FUMARATE 25 MG/1
25 TABLET, FILM COATED ORAL
Status: DISCONTINUED | OUTPATIENT
Start: 2025-05-27 | End: 2025-05-27

## 2025-05-27 RX ADMIN — SENNOSIDES 17.2 MG: 8.6 TABLET, FILM COATED ORAL at 11:57

## 2025-05-27 RX ADMIN — CEFADROXIL 1000 MG: 500 CAPSULE ORAL at 08:46

## 2025-05-27 RX ADMIN — LEUCOVORIN CALCIUM 25 MG: 25 TABLET ORAL at 02:03

## 2025-05-27 RX ADMIN — DOCUSATE SODIUM 100 MG: 100 CAPSULE, LIQUID FILLED ORAL at 08:44

## 2025-05-27 RX ADMIN — SITAGLIPTIN 50 MG: 50 TABLET, FILM COATED ORAL at 08:44

## 2025-05-27 RX ADMIN — ATORVASTATIN CALCIUM 20 MG: 20 TABLET, FILM COATED ORAL at 08:44

## 2025-05-27 RX ADMIN — QUETIAPINE FUMARATE 12.5 MG: 25 TABLET ORAL at 20:47

## 2025-05-27 RX ADMIN — QUETIAPINE 12.5 MG: 25 TABLET ORAL at 11:57

## 2025-05-27 RX ADMIN — DOCUSATE SODIUM 100 MG: 100 CAPSULE, LIQUID FILLED ORAL at 18:03

## 2025-05-27 RX ADMIN — CEFADROXIL 1000 MG: 500 CAPSULE ORAL at 20:40

## 2025-05-27 RX ADMIN — HEPARIN SODIUM 5000 UNITS: 5000 INJECTION INTRAVENOUS; SUBCUTANEOUS at 20:47

## 2025-05-27 RX ADMIN — CHLORHEXIDINE GLUCONATE 15 ML: 1.2 SOLUTION ORAL at 08:47

## 2025-05-27 RX ADMIN — ALLOPURINOL 300 MG: 300 TABLET ORAL at 08:44

## 2025-05-27 RX ADMIN — PANTOPRAZOLE SODIUM 40 MG: 40 TABLET, DELAYED RELEASE ORAL at 05:10

## 2025-05-27 RX ADMIN — HEPARIN SODIUM 5000 UNITS: 5000 INJECTION INTRAVENOUS; SUBCUTANEOUS at 13:44

## 2025-05-27 RX ADMIN — Medication 6 MG: at 20:40

## 2025-05-27 RX ADMIN — SODIUM CHLORIDE 75 ML/HR: 0.9 INJECTION, SOLUTION INTRAVENOUS at 02:52

## 2025-05-27 RX ADMIN — HEPARIN SODIUM 5000 UNITS: 5000 INJECTION INTRAVENOUS; SUBCUTANEOUS at 05:10

## 2025-05-27 RX ADMIN — LEUCOVORIN CALCIUM 25 MG: 25 TABLET ORAL at 08:49

## 2025-05-27 RX ADMIN — CHLORHEXIDINE GLUCONATE 15 ML: 1.2 SOLUTION ORAL at 20:40

## 2025-05-27 NOTE — ASSESSMENT & PLAN NOTE
"Patient with development of worsening confusion, and was seen in the ED on 3/24/2025.  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  Lymphoma/leukemia panel was nondiagnostic  CTH 4/3/25: concerning for worsening hydrocephalus.   Repeat LP 4/7/25 = undiagnostic again   MRI brain 4/11/25: \"Interval enlargement of the dominant left anterior basal ganglionic mass lesion with greater surrounding vasogenic edema and mass effect. 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  S/p EVD, self removed 4/26  S/p Keppra 500mg BID x 7 days for postoperative seizure ppx   Started on chemotherapy during hospital stay and is for weekly Rituximab and Methotrexate every 2 weeks  S/p Dexamethasone taper = LD was on 5/19/25  Had Rituxan 5/10/25, 5/23/25  Did get Filgrastim 5/17 and 5/18  Was due for Methotrexate on 5/17 but + sepsis w/bacteremia.   ID cleared pt to get chemo and he did get the Methotrexate 5/21/25 and Decadron 10 mg IV x 1, Zofran 16mg IV  Renal was maintaining bicarb drip and pt getting q6hr UAs for urine pH --> bicarb drip stopped 5/23 and then switched to Isolyte @75ml/hr.   D/c'd NSS IV at 75/hr = 5/27  S/p Leucovorin q6hrs x 6 doses   Methotrexate level was <0.10 on 5/26  Pt has an appt with Dr Phan on 6/3/25   H-O gave dose of Granix today 5/28 before discharge and will be setting him up to get a dose as OP tomorrow 5/29 and Friday 5/30  "

## 2025-05-27 NOTE — ASSESSMENT & PLAN NOTE
5/19- Platelets down to 79K- will need to continue monitoring with chemotherapy now on hold  5/23- Plts improved to 153K from 145K  5/27- Plt stable at 153K

## 2025-05-27 NOTE — ASSESSMENT & PLAN NOTE
Hemoglobin A1C was 6.6 on 2/22/25  Sees [facility] in [place]  Home:  Janumet XR  qd  Here: Januvia 50mg qd/Metformin XR 1000 mg qd  Continue DM diet and QID Accuchecks with SSI Algo 1  Since creatinine was stable, restarted Metformin 1000 mg XR 5/22/25. (Last contrast study was 5/18/25).    Stopped the Lispro WM 5/21/25.    Continue SSI

## 2025-05-27 NOTE — PROGRESS NOTES
05/27/25 1200   Pain Assessment   Pain Assessment Tool 0-10   Pain Score No Pain   Restrictions/Precautions   Precautions Bed/chair alarms;1:1;Cognitive;Fall Risk;Multiple lines;Supervision on toilet/commode   Cognition   Overall Cognitive Status Impaired   Subjective   Subjective no complaints, cooperative for therapy   Sit to Stand   Type of Assistance Needed Supervision   Comment no AD   Sit to Stand CARE Score 4   Bed-Chair Transfer   Type of Assistance Needed Supervision   Comment no AD   Chair/Bed-to-Chair Transfer CARE Score 4   Transfer Bed/Chair/Wheelchair   Limitations Noted In Problem Solving   Car Transfer   Type of Assistance Needed Verbal cues;Supervision   Car Transfer CARE Score 4   Walk 10 Feet   Type of Assistance Needed Supervision   Comment no device   Walk 10 Feet CARE Score 4   Walk 50 Feet with Two Turns   Type of Assistance Needed Supervision   Comment no device   Walk 50 Feet with Two Turns CARE Score 4   Walk 150 Feet   Type of Assistance Needed Supervision   Comment no device   Walk 150 Feet CARE Score 4   Ambulation   Primary Mode of Locomotion Prior to Admission Walk   Distance Walked (feet) 350 ft  (x 4)   Limitations Noted In Heel Strike;Safety;Speed   Findings cueing for direction, working on change of direction for balance. Busy halls, bu t no LOB, however poor awareness for IV pole.   Does the patient walk? 2. Yes   Wheelchair mobility   Does the patient use a wheelchair? 0. No   Curb or Single Stair   Style negotiated Single stair   Type of Assistance Needed Supervision   Comment R HR   1 Step (Curb) CARE Score 4   4 Steps   Type of Assistance Needed Supervision   Comment R HR   4 Steps CARE Score 4   12 Steps   Type of Assistance Needed Supervision   Comment R HR   12 Steps CARE Score 4   Picking Up Object   Type of Assistance Needed Supervision   Picking Up Object CARE Score 4   Toilet Transfer   Type of Assistance Needed Supervision   Toilet Transfer CARE Score 4    Therapeutic Interventions   Strengthening seated LAQ, hip flexion 4x12 each   Flexibility Seated Passive stretch B HS and calves x 3 min   Equipment Use   NuStep 12 min lvl 2 B LE only   Assessment   Treatment Assessment pt cooperative and participating well with no complaints. IV fluids running throughout session with pt needeing assistance to manage pole and cues for line awareness. Continues to perform all mobility at supervision level. Remained up in recliner chair at end with 1:1 present.   PT Therapy Minutes   PT Time In 1200   PT Time Out 1300   PT Total Time (minutes) 60   PT Mode of treatment - Individual (minutes) 60   PT Mode of treatment - Concurrent (minutes) 0   PT Mode of treatment - Group (minutes) 0   PT Mode of treatment - Co-treat (minutes) 0   PT Mode of Treatment - Total time(minutes) 60 minutes   PT Cumulative Minutes 1281   Therapy Time missed   Time missed? No

## 2025-05-27 NOTE — ASSESSMENT & PLAN NOTE
CMP checked on 5/9 and AST is down to 57 from 111 and ALT to 281 from 322  5/19 labs: AST 11 and ALT 25.  5/23- AST 89 and  - monitor routinely while on MTX  5/27- AST 23 and ALT downtrending 103

## 2025-05-27 NOTE — PROGRESS NOTES
"Progress Note - Hospitalist   Name: Alexis Dennison 64 y.o. male I MRN: [id number]  Unit/Bed#: -01 I Date of Admission: 5/7/2025   Date of Service: 5/27/2025 I Hospital Day: 20    Assessment & Plan  Primary central nervous system (CNS) lymphoma  Patient with development of worsening confusion, and was seen in the ED on 3/24/2025.  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  Lymphoma/leukemia panel was nondiagnostic  CTH 4/3/25: concerning for worsening hydrocephalus.   Repeat LP 4/7/25 = undiagnostic again   MRI brain 4/11/25: \"Interval enlargement of the dominant left anterior basal ganglionic mass lesion with greater surrounding vasogenic edema and mass effect. 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  S/p EVD, self removed 4/26  S/p Keppra 500mg BID x 7 days for postoperative seizure ppx   Started on chemotherapy during hospital stay and is for weekly Rituximab and Methotrexate every 2 weeks  S/p Dexamethasone taper = LD was on 5/19/25  Maintain PICC line  Had Rituxan 5/10/25, 5/23/25  Did get Filgrastim 5/17 and 5/18  Was due for Methotrexate on 5/17 but + sepsis w/bacteremia.   ID cleared pt to get chemo and he did get the Methotrexate 5/21/25 and Decadron 10 mg IV x 1, Zofran 16mg IV  Renal was maintaining bicarb drip and pt getting q6hr UAs for urine pH --> bicarb drip stopped 5/23 and then switched to Isolyte @75ml/hr.   Currently on NS at 75/hr = will d/c " today 5/27/25  S/p Leucovorin q6hrs x 6 doses   Methotrexate levels was <0.10 on 5/26/25  Pt has an appt with Dr Phan on 6/3/25   Type 2 diabetes mellitus with hyperglycemia, without long-term current use of insulin (HCC)  Hemoglobin A1C was 6.6 on 2/22/25  Sees Endo [facility] in [place]  Home:  Janumet XR  qd  Here: Januvia 50mg qd/Metformin XR 1000 mg qd  Continue DM diet and QID Accuchecks with SSI Algo 1  Since creatinine was stable, restarted Metformin 1000 mg XR 5/22/25. (Last contrast study was 5/18/25).    Stopped the Lispro WM 5/21/25.    Continue SSI  Mixed hyperlipidemia  Continue atorvastatin  Did have slightly elevated LFT's on prior labs but most recently normal, consider holding statin if LFTs trend back up  Thyroid nodule  TSH/free T4 4/21/25 = 0.019/1.13  Repeat TSH/free T4 done 5/12 = TSH was 0.287 with free T4 0.85  TSH has normalized 5/21.  D/W [facility] -->  plan for OP US.  Pulmonary nodule  CT chest 3 months follow-up  Liver lesion  Patient will require outpatient follow-up  Encephalopathy  Continue Seroquel  S/p steroid taper- last dose was 5/19/25  On 1:1  LETY (acute kidney injury) (HCC)  Monitor status post methotrexate  Peak creat was 2.77 on 4/22/25  Previous baseline as OP 5/2023-2/22/25 was 1.1 to 1.0  CMP 5/9/25 with creatinine of 1.18 down from 1.23 on 5/8 and on 5/10 was 1.15  CMP on 5/12/25 showed creatinine stable at 1.1  5/17: Creat up again to 1.38 = Being followed by Nephrology. Met sepsis criteria 5/17 and placed on IVF ordered as per sepsis protocol  5/19: Creat trending down and was 1.20 -->  IVF was  reduced to 50ml/hr per renal.  On 5/20/25-5/21/25, they want to continue same IVF  Creatinine 5/27 back down to 1.0.  Will stop IVF  Sepsis (HCC)  Patient noted to have hypotension and tachycardia on 5/17  Sepsis workup ordered and found to have positive blood cultures again, growing E coli as before  ID reconsulted  Started Cefepime 5/17/25 =  "await blood urine cx results but preliminary results of urine >100,000 GNR and prelim blood cx = GNR with blood cx ID panel positive for E.coli  Continue IVF until methotrexate level < 0.1  He has had stable BP, tachycardia has resolved and he has had no fevers  Updated plan from ID as below  Recurrent E. coli bacteremia  Patient completed 7 days of IV cefazolin which was finished on May 5  CT of A/P was done 5/18 per recommendation from ID looking for possible reason for recurrent bacteremia = was unrevealing  Was on Ceftriaxone --> ID changed to Jennifericef to continue through 5/30/25 for a 14 days total of antibiotic tx  HTN (hypertension)  Home:  Losartan -25 qd  Here:  no meds   Stable BP today 5/27/25  Transaminitis  AST is 111, previously 114,  ALT is 322 previously 149  D/w H-O, they are aware =  \"Could be due to recent chemotherapy versus antibiotics versus liver lesions\".    CMP 5/9/25 = AST 57 (previously 111),  (previously 322)  Stable with  and AST 23  Leukopenia  resolved  Hyponatremia  resolved  Thrombocytopenia (HCC)  Per H-O  For platelet transfusion if counts drop below 20K or in setting of any spontaneous bleeding   Currently normal  Anemia  Hgb dropped to 6.3 on 5/18 and was confirmed by repeat lab  at 6.7 so was transfused with one unit of PRBCs after discussion with Oncology  Hgb 6.8 with recheck 7.6 = d/w H-O and they want him to get a unit of blood leukoreduced and irradiated (d/w Dr Renteria who discussed with Dr Roberson).      The above assessment and plan was reviewed and updated as determined by my evaluation of the patient on 5/27/2025.    History of Present Illness   Patient seen and examined. Patients overnight issues or events were reviewed with nursing staff. New or overnight issues include the following:   No new or overnight issues.  Offers no complaints    Review of Systems   All other systems reviewed and are negative.      Objective :  Temp:  [97.5 °F (36.4 °C)-98.2 " °F (36.8 °C)] 97.5 °F (36.4 °C)  HR:  [76-92] 76  BP: (116-136)/(68-77) 116/71  Resp:  [17-18] 17  SpO2:  [97 %-100 %] 98 %  O2 Device: None (Room air)    Invasive Devices       Peripherally Inserted Central Catheter Line  Duration             PICC Line 05/01/25 Left Brachial 26 days                    Physical Exam  General Appearance: no distress, nontoxic appearing  HEENT:  External ear normal.  Nose normal w/o drainage. Mucous membranes are moist. Oropharynx is clear. Conjunctiva clear w/o icterus or redness.  Neck:  Supple, normal ROM  Lungs: BBS without crackles/wheeze/rhonchi; respirations unlabored with normal inspiratory/expiratory effort.  No retractions noted.  On RA  CV: regular rate and rhythm; no rubs/murmurs/gallops, PMI normal   ABD: Abdomen is soft.  Bowel sounds all quadrants.  Nontender with no distention.    EXT: no edema  Skin: normal turgor, normal texture  Psych: affect flat, mood subdued  Neuro: AA       The above physical exam was reviewed and updated as determined by my evaluation of the patient on 5/27/2025.      Lab Results: I have reviewed the following results:  Results from last 7 days   Lab Units 05/27/25  1023 05/27/25  0508 05/26/25  0455   WBC Thousand/uL  --  3.21* 5.38   HEMOGLOBIN g/dL 7.6* 6.8* 9.6*   HEMATOCRIT % 23.8* 21.2* 28.6*   PLATELETS Thousands/uL  --  153 207     Results from last 7 days   Lab Units 05/27/25  0508 05/26/25  0455   SODIUM mmol/L 139 143   POTASSIUM mmol/L 3.9 4.3   CHLORIDE mmol/L 104 105   CO2 mmol/L 32 28   BUN mg/dL 22 28*   CREATININE mg/dL 1.05 1.32*   CALCIUM mg/dL 8.6 9.3             Results from last 7 days   Lab Units 05/27/25  1034 05/27/25  0537 05/26/25  2103   POC GLUCOSE mg/dl 149* 103 116       Imaging Results Review: No pertinent imaging studies reviewed.  Other Study Results Review: No additional pertinent studies reviewed.    Review of Scheduled Meds: Medications  reviewed and reconciled as needed  Current Facility-Administered  Medications   Medication Dose Route Frequency Provider Last Rate    acetaminophen  650 mg Oral Q6H PRN Crispin Arana MD      allopurinol  300 mg Oral Daily Crispin Arana MD      alteplase  2 mg Intracatheter Q1MIN PRN Crispin Arana MD      alteplase  2 mg Intracatheter Q1MIN PRN Magali Lee MD      alteplase  2 mg Intracatheter Q1MIN PRN Satanm Kebede MD      alteplase  2 mg Intracatheter Q1MIN PRN Satnam Kebede MD      alteplase  2 mg Intracatheter Q1MIN PRN Satnam Kebede MD      aluminum-magnesium hydroxide-simethicone  30 mL Oral Q4H PRN Crispin Arana MD      atorvastatin  20 mg Oral Daily Crispin Arana MD      bisacodyl  10 mg Rectal Daily PRN Skyler Hendrickson MD      calcium carbonate  1,000 mg Oral Daily PRN Crispin Arana MD      cefadroxil  1,000 mg Oral Q12H [facility] Lisa Singh MD      chlorhexidine  15 mL Mouth/Throat Q12H CAIT Crispin Arana MD      docusate sodium  100 mg Oral BID Jessica Arreola, DO      heparin (porcine)  5,000 Units Subcutaneous Q8H [facility] Crispin Arana MD      insulin lispro  1-5 Units Subcutaneous TID AC PATI Porras      insulin lispro  1-5 Units Subcutaneous HS PATI Porras      lactulose  20 g Oral Daily PRN Joshua Kaminski, DO      melatonin  6 mg Oral HS Crispin Arana MD      metFORMIN  1,000 mg Oral Daily PATI Porras      OLANZapine  5 mg Intramuscular BID PRN Crispin Arana MD      ondansetron  4 mg Intravenous Q6H PRN Crispin Arana MD      oxyCODONE  2.5 mg Oral Q4H PRN Crispin Arana MD      Or    oxyCODONE  5 mg Oral Q4H PRN Crispin Arana MD      pantoprazole  40 mg Oral Early Morning Crispin Arana MD      polyethylene glycol  17 g Oral Daily PRN Skyler Hendrickson MD      QUEtiapine  12.5 mg Oral Daily Crispin Arana MD      QUEtiapine  50 mg Oral HS Crispin Arana MD      senna  2 tablet Oral Daily With Lunch Jessica HUIZAR Arreola, DO      sitaGLIPtin  50 mg Oral Daily PATI Porras         VTE Pharmacologic  Prophylaxis: HSQ  Code Status: Level 1 - Full Code  Current Length of Stay: 20 day(s)    Administrative Statements     ** Please Note:  voice to text software may have been used in the creation of this document. Although proof errors in transcription or interpretation are a potential of such software**

## 2025-05-27 NOTE — ASSESSMENT & PLAN NOTE
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  > Started on high dose steroids and methotrexate every 2 weeks for 4 weeks (C1 received 4/18, C2 received 5/2) then maintenance every 4 weeks with Rituximab weekly for 8 weeks (1st dose Thursday 4/24 and second 5/3)  > Admitted to [facility] with ongoing cognitive deficits and delirium  > Completed Decadron taper on 5/19    - MTX + Rituximab restarted on 5/21 with bicarb drip, managed by Onc/Nephro  - MTX at  0.4 as of 1700 yesteday, new level at 1700 today. Can discharge once level in desired range. Anticipate this may be tomorrow and discussed with nursing to inform his wife Naldo if level is appropriate. Waiting for clearance from Heme/Onc for discharge  Level 5/24 of 0.25 and not yet below 0.10  > 5/27- MTX <0.10 late yesterday evening. However, now Hgb is low and Onc recommends transfusion prior to d/c. Will push DC to tomorrow- wife is aware  - Onc to set-up outpatient Rituximab infusions for after discharge  - Daily methotrexate levels  - Hem/Onc following  - NSGY following  - SLP for cognition

## 2025-05-27 NOTE — ASSESSMENT & PLAN NOTE
Monitor status post methotrexate  Peak creat was 2.77 on 4/22/25  Previous baseline as OP 5/2023-2/22/25 was 1.1 to 1.0  CMP 5/9/25 with creatinine of 1.18 down from 1.23 on 5/8 and on 5/10 was 1.15  CMP on 5/12/25 showed creatinine stable at 1.1  5/17: creat up again to 1.38 = Being followed by Nephrology. Met sepsis criteria 5/17 and placed on IVF ordered as per sepsis protocol  5/19: creat trending down and was 1.20 -->  IVF was  reduced to 50ml/hr per renal.  On 5/20/25-5/21/25, they want to continue same IVF  Cr 5/27 was 1.0 = stopped IVF

## 2025-05-27 NOTE — ASSESSMENT & PLAN NOTE
"Patient with development of worsening confusion, and was seen in the ED on 3/24/2025.  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  Lymphoma/leukemia panel was nondiagnostic  CTH 4/3/25: concerning for worsening hydrocephalus.   Repeat LP 4/7/25 = undiagnostic again   MRI brain 4/11/25: \"Interval enlargement of the dominant left anterior basal ganglionic mass lesion with greater surrounding vasogenic edema and mass effect. 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  S/p EVD, self removed 4/26  S/p Keppra 500mg BID x 7 days for postoperative seizure ppx   Started on chemotherapy during hospital stay and is for weekly Rituximab and Methotrexate every 2 weeks  S/p Dexamethasone taper = LD was on 5/19/25  Maintain PICC line  Had Rituxan 5/10/25, 5/23/25  Did get Filgrastim 5/17 and 5/18  Was due for Methotrexate on 5/17 but + sepsis w/bacteremia.   ID cleared pt to get chemo and he did get the Methotrexate 5/21/25 and Decadron 10 mg IV x 1, Zofran 16mg IV  Renal was maintaining bicarb drip and pt getting q6hr UAs for urine pH --> bicarb drip stopped 5/23 and then switched to Isolyte @75ml/hr.   Currently on NS at 75/hr = will d/c today 5/27/25  S/p Leucovorin q6hrs x 6 doses   Methotrexate levels was <0.10 on 5/26/25  Pt has an appt with Dr Phan on 6/3/25   "

## 2025-05-27 NOTE — PROGRESS NOTES
Progress Note - PMR   Name: Alexis Dennison 64 y.o. 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 Can discharge once level in desired range. Anticipate this may be tomorrow and discussed with nursing to inform his wife Naldo if level is appropriate. Waiting for clearance from Heme/Onc for discharge  Level 5/24 of 0.25 and not yet below 0.10  > 5/27- MTX <0.10 late yesterday evening. However, now Hgb is low and Onc recommends transfusion prior to d/c. Will push DC to tomorrow- wife is aware  - Onc to set-up outpatient Rituximab infusions for after discharge  - Daily methotrexate levels  - Hem/Onc following  - NSGY following  - SLP for cognition  Type 2 diabetes mellitus with hyperglycemia, without long-term current use of insulin (HCC)  Lab Results   Component Value Date    HGBA1C 6.6 (H) 02/22/2025       Recent Labs     05/26/25  1551 05/26/25  2103 05/27/25  0537 05/27/25  1034    POCGLU 97 116 103 149*       Blood Sugar Average: Last 72 hrs:  (P) [id number]    - Metformin 1000mg daily restarted 5/22  - Transitioned off insulin and onto Januvia 50 mg daily with SSI  - Monitor accuchecks while on steroids  - Management per IM  HTN, goal below 130/80  > Blood pressure controlled off antihypertensives    - Monitor VS  - Management per IM  Mixed hyperlipidemia  - Continue atorvastatin 10 mg daily  Thyroid nodule  Outpatient endocrine follow-up needed  Pulmonary nodule  > CTA 3/29: nonspecific 4 mm RLL pulmonary nodule   - follow up outpatient for repeat imaging in 3 months  Liver lesion  > CTA 3/29- nonspecific 12mm hypodense right hepatic lesion, recommended MRI abdomen  > MRI abdomen 3/30- simple right hepatic lobe cyst    - Follow up outpatient for monitoring  Ambulatory dysfunction  - Fall precautions  - PT/OT  Encephalopathy  > Patient has been pleasantly confused for months in the setting of brain mass.  > acutely worsened due to UTI and bacteremia. S/p 7 day course of antibx  > Continues to lack insight to his current situation and is severely cognitively impaired.  He does not get agitated for long periods of time and is mostly confused and impulsive.  > A&O 1 on [facility] admission    - Continue seroquel 12.5mg at noon with 50mg HS  - PRN Zyprexa 2.5mg IM as needed for agitation  -Overstimulation precautions, frequent re-orientation, re-direction, re-assurance  -Sleep log and agitation monitoring if needed  -Optimize sleep-wake cycle  -Limit sedating medications when possible  - Ensure optimal management electrolytes, nutrition, and hydration  - Ensure optimal bowel/bladder management  - Ensure optimal pain management   -Patient/family/caregiver education and training   -Continue 1:1 due to overall safety, impulsivity, lack of safety awareness and poor insight.  -Fall precautions with frequent rounding; proactive toileting program, patient should not be unattended in bathroom       LETY (acute kidney injury) (HCC)  > Baseline Cr 0.9  > Recently 1.3 improved from before- peak 2.7  > Now s/p bicarb    - IVF per IM/Nephro  - Management at discretion of nephro   E. coli UTI  > Pet met sepsis criteria on 4/29 with confusion and agitation. UA was positive. Urine culture and blood cultures showed Ecoli sensitive to cephalosporins.  > ID consulted and pt completed 7d course of ceftriaxone  > Approved to restart chemo 5/2.  > Chemo on hold for GNR UTI and bacteremia > restarted chemo on 5/21    - Monitor while on antibiotics  Impaired mobility and activities of daily living  Patient was evaluated by the rehabilitation team MD and an appropriate candidate for acute inpatient rehabilitation program at this time.  The patient will tolerate 3 hours/day 5 to 7 days/week of intensive physical, occupational in order to obtain goals for community discharge  Due to the patient's functional Compared to their baseline level of function in addition to their ongoing medical needs, the patient would benefit from daily supervision from a rehabilitation physician as well as rehabilitation nursing to implement and adjust the medical as well as functional plan of care in order to meet the patient's goals.  HTN (hypertension)  Had been on losartan but holding at this time- his blood pressures are good  Transaminitis  CMP checked on 5/9 and AST is down to 57 from 111 and ALT to 281 from 322  5/19 labs: AST 11 and ALT 25.  5/23- AST 89 and  - monitor routinely while on MTX  5/27- AST 23 and ALT downtrending 103  Hyponatremia  Sodium stable at 140 on 5/19 labs  5/27- Na stable at 139  Thrombocytopenia (HCC)  5/19- Platelets down to 79K- will need to continue monitoring with chemotherapy now on hold  5/23- Plts improved to 153K from 145K  5/27- Plt stable at 153K  Sepsis (HCC)  5/19- Sepsis alert over the weekend.  CT A/P showed perinephric stranding. UA abnormal and Ucx grew GNR in urine. Blood cultures also + for  GNR. Lactate 4.4>5.1. procal 7.39> 6.55. Started on IV antibiotics and Chemo on hold per Onc  5/22- Cleared by ID to restart chemo. Transitioned to PO antibiotics    - Continue antibiotics  - Monitor WBC on routine labs  - Follow temp curve  Recurrent E. coli bacteremia  5/20- Antibx changed from cefepime to ceftriaxone per ID recommendations. Need to follow up final susceptibilities and adjust antibiotics accordingly  Now on oral Duricef, no longer on IV medications  Leukopenia  5/19- WBC improving. Now 3.93 from 1.84 off chemo  5/23- WBC stable at 6.54  5/27- WBC 3.21  Anemia  5/19 - hemoglobin of 8.0 - improved from 6.7 after transfusion of 1u pRBC over the weekend  5/23- Hgb 9.9  -- improved from 8.9  5/27- Hgb 6.8 this morning with repeat 7.6 - Onc recommends 1u leukoreduced and irradiated pRBC  At risk for acid-base imbalance  Urine pH testing and sodium bicarb drip with MTX treatments  Electrolyte imbalance risk    SIRS (systemic inflammatory response syndrome) (HCC)    Ankle edema, bilateral      Subjective   64-year-old male with history of hyperlipidemia hypertension, GERD, type 2 diabetes, nonalcoholic fatty liver disease and sleep apnea presenting on 3/29 for increased confusion after recently being diagnosed with a brain mass underwent workup with consistent findings of a CNS lymphoma noted treated with methotrexate and Rituxan and course complicated LETY, delirium and agitation as well as an E. coli UTI and bacteremia     Chief Complaint: f/u non-traumatic brain injury and f/u ambulatory dysfunction    Interval: Patient seen and examined. No acute overnight events. Discussed with patient and wife about MTX levels now being WNL. However, blood counts were low and need to be rechecked. Discharge will be either today or tomorrow based on labs. Otherwise, pt denies any fevers, chills, chest pain, sob, abdominal pain, nausea/vomiting, lightheadedness or dizziness. Labs this morning reviewed and notable for  , total protein 5.2, albumin 3.2, WBC 3.21, Hgb 6.8. Repeat Hgb 7.6. Informed by IM afterwards that Onc is aware and recommends leukoreduced and irradiated blood transfusion prior to discharge. Wife was informed that d/c will be moved to tomorrow.    Objective :  Temp:  [97.5 °F (36.4 °C)-97.7 °F (36.5 °C)] 97.5 °F (36.4 °C)  HR:  [76-82] 76  BP: (116-136)/(68-71) 116/71  Resp:  [17-18] 17  SpO2:  [98 %-100 %] 98 %  O2 Device: None (Room air)    Functional Update:  Mobility: sup bed mobility, sup ambulation 250' without AD  Transfers: sup transfers  ADLs: setup eating, sup grooming/UBD/LBD, sup-CGA bathing/toileting    Physical Exam  Vitals reviewed.   Constitutional:       Appearance: Normal appearance.   HENT:      Head: Normocephalic and atraumatic.      Right Ear: External ear normal.      Left Ear: External ear normal.      Nose: Nose normal.      Mouth/Throat:      Mouth: Mucous membranes are moist.      Pharynx: Oropharynx is clear.     Eyes:      Conjunctiva/sclera: Conjunctivae normal.       Cardiovascular:      Rate and Rhythm: Normal rate and regular rhythm.      Heart sounds: Normal heart sounds.   Pulmonary:      Effort: Pulmonary effort is normal. No respiratory distress.      Breath sounds: Normal breath sounds. No wheezing.   Abdominal:      General: There is no distension.      Palpations: Abdomen is soft.     Musculoskeletal:      Cervical back: Normal range of motion.     Skin:     General: Skin is warm and dry.     Neurological:      Mental Status: He is alert. Mental status is at baseline.     Psychiatric:         Mood and Affect: Mood normal.         Behavior: Behavior normal.           Scheduled Meds:  Current Facility-Administered Medications   Medication Dose Route Frequency Provider Last Rate    acetaminophen  650 mg Oral Q6H PRN Crispin Arana MD      allopurinol  300 mg Oral Daily Crispin Arana MD      alteplase  2 mg Intracatheter Q1MIN PRN Crispin Arana MD      alteplase  2  mg Intracatheter Q1MIN PRN Magali Lee MD      alteplase  2 mg Intracatheter Q1MIN PRN Satnam Kebede MD      alteplase  2 mg Intracatheter Q1MIN PRN Satnam Kebede MD      alteplase  2 mg Intracatheter Q1MIN PRN Satnam Kebede MD      aluminum-magnesium hydroxide-simethicone  30 mL Oral Q4H PRN Crispin Arana MD      atorvastatin  20 mg Oral Daily Crispin Arana MD      bisacodyl  10 mg Rectal Daily PRN Skyler Hendrickson MD      calcium carbonate  1,000 mg Oral Daily PRN Crispin Arana MD      cefadroxil  1,000 mg Oral Q12H Pending sale to [facility] Lisa Singh MD      chlorhexidine  15 mL Mouth/Throat Q12H Pending sale to [facility] Crispin Arana MD      docusate sodium  100 mg Oral BID Jessica Arreola,       heparin (porcine)  5,000 Units Subcutaneous Q8H Pending sale to [facility] Crispin Arana MD      insulin lispro  1-5 Units Subcutaneous TID AC PATI Porras      insulin lispro  1-5 Units Subcutaneous HS PATI Porras      lactulose  20 g Oral Daily PRN Joshua Kaminski DO      melatonin  6 mg Oral HS Crispin Arana MD      metFORMIN  1,000 mg Oral Daily PATI Porras      OLANZapine  5 mg Intramuscular BID PRN Crispin Arana MD      ondansetron  4 mg Intravenous Q6H PRN Crispin Arana MD      oxyCODONE  2.5 mg Oral Q4H PRN Crispin Arana MD      Or    oxyCODONE  5 mg Oral Q4H PRN Crispin Arana MD      pantoprazole  40 mg Oral Early Morning Crispin Arana MD      polyethylene glycol  17 g Oral Daily PRN Skyler Hendrickson MD      QUEtiapine  12.5 mg Oral Daily Crispin Arana MD      QUEtiapine  50 mg Oral HS Crispin Arana MD      senna  2 tablet Oral Daily With Lunch Jessica Arreola,       sitaGLIPtin  50 mg Oral Daily PATI Porras           Lab Results: I have reviewed the following results:  Results from last 7 days   Lab Units 05/27/25  1023 05/27/25  0508 05/26/25  0455 05/25/25  0544   HEMOGLOBIN g/dL 7.6* 6.8* 9.6* 9.3*   HEMATOCRIT % 23.8* 21.2* 28.6* 29.0*   WBC Thousand/uL  --  3.21* 5.38 5.11    PLATELETS Thousands/uL  --  153 207 177     Results from last 7 days   Lab Units 05/27/25  0508 05/26/25  0455 05/25/25  0544   BUN mg/dL 22 28* 23   SODIUM mmol/L 139 143 139   POTASSIUM mmol/L 3.9 4.3 3.8   CHLORIDE mmol/L 104 105 102   CREATININE mg/dL 1.05 1.32* 1.21   AST U/L 23 52* 90*   ALT U/L 103* 181* 194*

## 2025-05-27 NOTE — PLAN OF CARE
Problem: PAIN - ADULT  Goal: Verbalizes/displays adequate comfort level or baseline comfort level  Description: Interventions:- Encourage patient to monitor pain and request assistance- Assess pain using appropriate pain scale- Administer analgesics based on type and severity of pain and evaluate response- Implement non-pharmacological measures as appropriate and evaluate response- Consider cultural and social influences on pain and pain management- Notify physician/advanced practitioner if interventions unsuccessful or patient reports new pain  Outcome: Progressing     Problem: INFECTION - ADULT  Goal: Absence or prevention of progression during hospitalization  Description: INTERVENTIONS:- Assess and monitor for signs and symptoms of infection- Monitor lab/diagnostic results- Monitor all insertion sites, i.e. indwelling lines, tubes, and drains- Monitor endotracheal if appropriate and nasal secretions for changes in amount and color- [place] appropriate cooling/warming therapies per order- Administer medications as ordered- Instruct and encourage patient and family to use good hand hygiene technique- Identify and instruct in appropriate isolation precautions for identified infection/condition  Outcome: Progressing     Problem: SAFETY ADULT  Goal: Patient will remain free of falls  Description: INTERVENTIONS:- Educate patient/family on patient safety including physical limitations- Instruct patient to call for assistance with activity - Consult OT/PT to assist with strengthening/mobility - Keep Call bell within reach- Keep bed low and locked with side rails adjusted as appropriate- Keep care items and personal belongings within reach- Initiate and maintain comfort rounds- Make Fall Risk Sign visible to staff- Offer Toileting every 2 Hours, in advance of need- Initiate/Maintain bed alarm- Obtain necessary fall risk management equipment:  - Apply yellow socks and bracelet for high fall risk patients- Consider  moving patient to room near nurses station  Outcome: Progressing  Goal: Maintain or return to baseline ADL function  Description: INTERVENTIONS:-  Assess patient's ability to carry out ADLs; assess patient's baseline for ADL function and identify physical deficits which impact ability to perform ADLs (bathing, care of mouth/teeth, toileting, grooming, dressing, etc.)- Assess/evaluate cause of self-care deficits - Assess range of motion- Assess patient's mobility; develop plan if impaired- Assess patient's need for assistive devices and provide as appropriate- Encourage maximum independence but intervene and supervise when necessary- Involve family in performance of ADLs- Assess for home care needs following discharge - Consider OT consult to assist with ADL evaluation and planning for discharge- Provide patient education as appropriate  Outcome: Progressing  Goal: Maintains/Returns to pre admission functional level  Description: INTERVENTIONS:- Perform AM-PAC 6 Click Basic Mobility/ Daily Activity assessment daily.- Set and communicate daily mobility goal to care team and patient/family/caregiver. - Collaborate with rehabilitation services on mobility goals if consulted- Perform Range of Motion 3 times a day.- Reposition patient every 2 hours.- Dangle patient 3 times a day- Stand patient 3 times a day- Ambulate patient 3 times a day- Out of bed to chair for meals  Outcome: Progressing     Problem: DISCHARGE PLANNING  Goal: Discharge to home or other facility with appropriate resources  Description: INTERVENTIONS:- Identify barriers to discharge w/patient and caregiver- Arrange for needed discharge resources and transportation as appropriate- Identify discharge learning needs (meds, wound care, etc.)- Arrange for interpretive services to assist at discharge as needed- Refer to Case Management Department for coordinating discharge planning if the patient needs post-hospital services based on physician/advanced  practitioner order or complex needs related to functional status, cognitive ability, or social support system  Outcome: Progressing     Problem: Prexisting or High Potential for Compromised Skin Integrity  Goal: Skin integrity is maintained or improved  Description: INTERVENTIONS:- Identify patients at risk for skin breakdown- Assess and monitor skin integrity- Assess and monitor nutrition and hydration status- Monitor labs - Assess for incontinence - Turn and reposition patient- Assist with mobility/ambulation- Relieve pressure over bony prominences- Avoid friction and shearing- Provide appropriate hygiene as needed including keeping skin clean and dry- Evaluate need for skin moisturizer/barrier cream- Collaborate with interdisciplinary team - Patient/family teaching- Consider wound care consult   Outcome: Progressing

## 2025-05-27 NOTE — ASSESSMENT & PLAN NOTE
Lab Results   Component Value Date    HGBA1C 6.6 (H) 02/22/2025       Recent Labs     05/26/25  1551 05/26/25  2103 05/27/25  0537 05/27/25  1034   POCGLU 97 116 103 149*       Blood Sugar Average: Last 72 hrs:  (P) 130.8097580163108838    - Metformin 1000mg daily restarted 5/22  - Transitioned off insulin and onto Januvia 50 mg daily with SSI  - Monitor accuchecks while on steroids  - Management per IM

## 2025-05-27 NOTE — ASSESSMENT & PLAN NOTE
Monitor status post methotrexate  Peak creat was 2.77 on 4/22/25  Previous baseline as OP 5/2023-2/22/25 was 1.1 to 1.0  CMP 5/9/25 with creatinine of 1.18 down from 1.23 on 5/8 and on 5/10 was 1.15  CMP on 5/12/25 showed creatinine stable at 1.1  5/17: Creat up again to 1.38 = Being followed by Nephrology. Met sepsis criteria 5/17 and placed on IVF ordered as per sepsis protocol  5/19: Creat trending down and was 1.20 -->  IVF was  reduced to 50ml/hr per renal.  On 5/20/25-5/21/25, they want to continue same IVF  Creatinine 5/27 back down to 1.0.  Will stop IVF

## 2025-05-27 NOTE — ASSESSMENT & PLAN NOTE
Patient's wife is in the room.    64 yoM with PMHx of DM2, NAFLD, and HT who presented with progressive encephalopathy found to have multiple scattered nodular cerebral and cerebellar enhancement who underwent two non-diagnostic LPs prompting stereotactic brain biopsy (4/14) which showed large B-cell lymphoma.  Patient received third cycle of high-dose methotrexate on 5/21/2025.  He is receiving leucovorin.    Patient received Rituxan on 5/23/2025.     Treatment Plan: regimen modified from (https://pubmed.ncbi.nlm.nih.gov/36710824/)     Biweekly HD-MTX/rituximab (8 g/m(2)/dose; 375 mg/m(2)/dose) for 4-6 cycles (induction) following with every 4 weeks HD-MTX (8 g/m(2)/dose) for 4 cycles (maintenance).     C1 (4/17) 8 g/m²  C2 (5/2) 3 g/m², dose-reduced due to LTEY, delayed x1 day due to E. coli bacteremia.  C3 (was planned for 5/17) delayed due to E. coli bacteremia, s/p MTX 6 g/m2 on 5/21. Rituxan on 5/23/25.  Recommendations:  Urine pH remained above 7.0.  24-hour post administration methotrexate 5.0, patient started on IV leucovorin, methotrexate level 48 hours came back at 0.4, leucovorin is transition to oral, continue same and have extended to 6 additional dose.    Continue to monitor methotrexate level, 5.10-->.40--> 0.25-->0.11--> 0.10 from 5/26/25.  Methotrexate level have appropriately trended down, no further IV fluids or urine pH or methotrexate check needed.  CBC this morning remarkable for hemoglobin 6.8 which dropped down from 9.4, will repeat H&H, if less than 7 will need transfusion.  Status post Rituxan on 5/23/2025. Mild transaminitis noted, no dose adjustment required for Rituxan.

## 2025-05-27 NOTE — ASSESSMENT & PLAN NOTE
TSH/free T4 4/21/25 = 0.019/1.13  Repeat TSH/free T4 done 5/12 = TSH was 0.287 with free T4 0.85  TSH has normalized 5/21.  D/W Endo = curbsided Dr Marshall --> plan for OP US.  PCP will need to set that up at office visit

## 2025-05-27 NOTE — ASSESSMENT & PLAN NOTE
Hgb dropped to 6.3 on 5/18 and was confirmed by repeat lab  at 6.7 so was transfused with one unit of PRBCs after discussion with Oncology  Hgb 6.8 with recheck 7.6 = d/w H-O and they want him to get a unit of blood leukoreduced and irradiated (d/w Dr Renteria who discussed with Dr Roberson).

## 2025-05-27 NOTE — ASSESSMENT & PLAN NOTE
Per H-O  For platelet transfusion if counts drop below 20K or in setting of any spontaneous bleeding   Currently normal

## 2025-05-27 NOTE — CASE MANAGEMENT
Received call from Louisa at [facility] inquiring about dc plans. Cm informed of plan today for pt to receive blood but was other wise scheduled to return home. Louisa confirmed coverage days with today being lcd. Cm had not received fax, tedelmer resent.  Cm to send clinical update if pt is not stable for dc. Louisa asked if assist was needed with follow up appmts, and cm informed of what was currently on dc instructions. Cm confirmed [facility] services in place for when pt dc's to home.

## 2025-05-27 NOTE — ASSESSMENT & PLAN NOTE
"AST is 111, previously 114,  ALT is 322 previously 149  D/w H-O, they are aware =  \"Could be due to recent chemotherapy versus antibiotics versus liver lesions\".    CMP 5/9/25 = AST 57 (previously 111),  (previously 322)  Stable with  and AST 23  "

## 2025-05-27 NOTE — ASSESSMENT & PLAN NOTE
Likely multifactorial.  Hemoglobin has been ranging around 9 until this morning when noted 6.8.  Repeat H&H with hemoglobin 7.6, recommend further transfusion of leukoreduced and irradiated prior to discharge.

## 2025-05-27 NOTE — PROGRESS NOTES
Progress Note - Oncology-Medical   Name: Alexis Dennison 64 y.o. male I MRN: [id number]  Unit/Bed#: -01 I Date of Admission: 5/7/2025   Date of Service: 5/27/2025 I Hospital Day: 20     Assessment & Plan  Primary central nervous system (CNS) lymphoma  Patient's wife is in the room.    64 yoM with PMHx of DM2, NAFLD, and HT who presented with progressive encephalopathy found to have multiple scattered nodular cerebral and cerebellar enhancement who underwent two non-diagnostic LPs prompting stereotactic brain biopsy (4/14) which showed large B-cell lymphoma.  Patient received third cycle of high-dose methotrexate on 5/21/2025.  He is receiving leucovorin.    Patient received Rituxan on 5/23/2025.     Treatment Plan: regimen modified from (https://pubmed.ncbi.nlm.nih.gov/71247459/)     Biweekly HD-MTX/rituximab (8 g/m(2)/dose; 375 mg/m(2)/dose) for 4-6 cycles (induction) following with every 4 weeks HD-MTX (8 g/m(2)/dose) for 4 cycles (maintenance).     C1 (4/17) 8 g/m²  C2 (5/2) 3 g/m², dose-reduced due to LETY, delayed x1 day due to E. coli bacteremia.  C3 (was planned for 5/17) delayed due to E. coli bacteremia, s/p MTX 6 g/m2 on 5/21. Rituxan on 5/23/25.  Recommendations:  Urine pH remained above 7.0.  24-hour post administration methotrexate 5.0, patient started on IV leucovorin, methotrexate level 48 hours came back at 0.4, leucovorin is transition to oral, continue same and have extended to 6 additional dose.    Continue to monitor methotrexate level, 5.10-->.40--> 0.25-->0.11--> 0.10 from 5/26/25.  Methotrexate level have appropriately trended down, no further IV fluids or urine pH or methotrexate check needed.  CBC this morning remarkable for hemoglobin 6.8 which dropped down from 9.4, will repeat H&H, if less than 7 will need transfusion.  Status post Rituxan on 5/23/2025. Mild transaminitis noted, no dose adjustment required for Rituxan.    Thyroid nodule  Patient incidentally found to have thyroid  nodule, was recommended ultrasound thyroid.  Pulmonary nodule  CTA from 3/29/2025 with nonspecific 4 mm right lower lobe pulmonary nodule, recommended 3 months follow-up.  Transaminitis  Likely secondary to methotrexate. Continue to monitor.   Thrombocytopenia (HCC)  Counts are being monitored.  Anemia  Likely multifactorial.  Hemoglobin has been ranging around 9 until this morning when noted 6.8.  Repeat H&H with hemoglobin 7.6, recommend further transfusion of leukoreduced and irradiated prior to discharge.      Outpatient follow up plan: Outpatient appointment rescheduled for 6/3/2025.    Communication with patient/family:  I did update the patient.    Communication with team:  Did communicate with  primary team.    I did review this patient with Dr. Roberson and they are in agreement with this plan.          Subjective:  Patient seen at bedside, no active complaints to offer.  Discussed DC plan with patient's wife at bedside, he has appointment with Dr. Phan on 6/3/2025.      Review of Systems   Constitutional:  Negative for appetite change, fatigue and unexpected weight change.   Respiratory:  Negative for chest tightness and shortness of breath.    Cardiovascular:  Negative for chest pain and palpitations.   Gastrointestinal:  Negative for constipation and vomiting.   Genitourinary:  Negative for enuresis and frequency.   Musculoskeletal:  Negative for arthralgias and joint swelling.   Skin:  Negative for color change and rash.   Neurological:  Negative for dizziness and facial asymmetry.   All other systems reviewed and are negative.         Objective:     Medication Administration - last 24 hours from 05/26/2025 0855 to 05/27/2025 0855         Date/Time Order Dose Route Action Action by     05/27/2025 0844 EDT atorvastatin (LIPITOR) tablet 20 mg 20 mg Oral Given Heather Hines RN     05/26/2025 0902 EDT atorvastatin (LIPITOR) tablet 20 mg 20 mg Oral Given Ketan Rehman RN     05/26/2025 1020 EDT  aluminum-magnesium hydroxide-simethicone (MAALOX) oral suspension 30 mL 30 mL Oral Given Ketan Rehman RN     05/27/2025 0847 EDT chlorhexidine (PERIDEX) 0.12 % oral rinse 15 mL 15 mL Mouth/Throat Given Heather Hines RN     05/26/2025 2135 EDT chlorhexidine (PERIDEX) 0.12 % oral rinse 15 mL 15 mL Mouth/Throat Given Maribell Catherine RN     05/26/2025 0902 EDT chlorhexidine (PERIDEX) 0.12 % oral rinse 15 mL 15 mL Mouth/Throat Not Given Ketan Rehman RN     05/27/2025 0510 EDT pantoprazole (PROTONIX) EC tablet 40 mg 40 mg Oral Given Maribell Catherine RN     05/27/2025 0844 EDT allopurinol (ZYLOPRIM) tablet 300 mg 300 mg Oral Given Heather Hines RN     05/26/2025 0902 EDT allopurinol (ZYLOPRIM) tablet 300 mg 300 mg Oral Given Ketan Rehman RN     05/27/2025 0510 EDT heparin (porcine) subcutaneous injection 5,000 Units 5,000 Units Subcutaneous Given Maribell Catherine RN     05/26/2025 2135 EDT heparin (porcine) subcutaneous injection 5,000 Units 5,000 Units Subcutaneous Given Maribell Catherine RN     05/26/2025 1355 EDT heparin (porcine) subcutaneous injection 5,000 Units 5,000 Units Subcutaneous Given Ketan Rehman RN     05/26/2025 2135 EDT melatonin tablet 6 mg 6 mg Oral Given Maribell Catherine RN     05/26/2025 1248 EDT QUEtiapine (SEROquel) tablet 12.5 mg 12.5 mg Oral Given Ketan Rehman RN     05/26/2025 2135 EDT QUEtiapine (SEROquel) tablet 50 mg 50 mg Oral Given Maribell Catherine RN     05/27/2025 0844 EDT docusate sodium (COLACE) capsule 100 mg 100 mg Oral Given Heather Hines RN     05/26/2025 1726 EDT docusate sodium (COLACE) capsule 100 mg 100 mg Oral Given Ketan Rehman RN     05/26/2025 0902 EDT docusate sodium (COLACE) capsule 100 mg 100 mg Oral Given Ketan Rehman RN     05/26/2025 1248 EDT senna (SENOKOT) tablet 17.2 mg 17.2 mg Oral Given Ketan Rehman RN     05/27/2025 0844 EDT sitaGLIPtin (JANUVIA) tablet 50 mg 50 mg Oral Given Heather Hines RN     05/26/2025 0902 EDT sitaGLIPtin  (JANUVIA) tablet 50 mg 50 mg Oral Given Ketan Rehman RN     05/27/2025 0611 EDT insulin lispro (HumALOG/ADMELOG) 100 units/mL subcutaneous injection 1-5 Units -- Subcutaneous Not Given Maribell Catherine RN     05/26/2025 1724 EDT insulin lispro (HumALOG/ADMELOG) 100 units/mL subcutaneous injection 1-5 Units -- Subcutaneous Not Given Ketan Rehman RN     05/26/2025 1248 EDT insulin lispro (HumALOG/ADMELOG) 100 units/mL subcutaneous injection 1-5 Units 1 Units Subcutaneous Given Ketan Rehman RN     05/26/2025 2139 EDT insulin lispro (HumALOG/ADMELOG) 100 units/mL subcutaneous injection 1-5 Units -- Subcutaneous Not Given Maribell Catherine RN     05/27/2025 0846 EDT cefadroxil (DURICEF) capsule 1,000 mg 1,000 mg Oral Given Heather Hines RN     05/26/2025 2136 EDT cefadroxil (DURICEF) capsule 1,000 mg 1,000 mg Oral Given Maribell Catherine RN     05/26/2025 0903 EDT cefadroxil (DURICEF) capsule 1,000 mg 1,000 mg Oral Given Ketan Rehman RN     05/31/2025 0900 EDT metFORMIN (GLUCOPHAGE-XR) 24 hr tablet 1,000 mg -- Oral Held Dose Jesenia Buenrostro PA-C     05/30/2025 0900 EDT metFORMIN (GLUCOPHAGE-XR) 24 hr tablet 1,000 mg -- Oral Held Dose Jesenia Buenrostro PA-C     05/29/2025 0900 EDT metFORMIN (GLUCOPHAGE-XR) 24 hr tablet 1,000 mg -- Oral Held Dose Jesenia Buenrostro PA-C     05/28/2025 0900 EDT metFORMIN (GLUCOPHAGE-XR) 24 hr tablet 1,000 mg -- Oral Held Dose Jesenia Buenrostro PA-C     05/27/2025 0900 EDT metFORMIN (GLUCOPHAGE-XR) 24 hr tablet 1,000 mg -- Oral Held Dose Jesenia Buenrostro PA-C     05/26/2025 1308 EDT metFORMIN (GLUCOPHAGE-XR) 24 hr tablet 1,000 mg -- Oral Held by provider MARLO Crisostomo-C     05/26/2025 0903 EDT metFORMIN (GLUCOPHAGE-XR) 24 hr tablet 1,000 mg 1,000 mg Oral Given Ketan Rehman RN     05/27/2025 0252 EDT sodium chloride 0.9 % infusion 75 mL/hr Intravenous New Bag Maribell Catherine RN     05/26/2025 1355 EDT sodium chloride 0.9 % infusion 75 mL/hr Intravenous New Bag Ketan Rehman,  "RN     05/26/2025 1248 EDT sodium chloride 0.9 % infusion 75 mL/hr Intravenous Rate/Dose Change Ketan Rehman RN     05/27/2025 0849 EDT leucovorin (WELLCOVORIN) tablet 25 mg 25 mg Oral Given Heather GUERRA LaurieaidanADELINE     05/27/2025 0203 EDT leucovorin (WELLCOVORIN) tablet 25 mg 25 mg Oral Given Maribell Catherine RN     05/26/2025 2136 EDT leucovorin (WELLCOVORIN) tablet 25 mg 25 mg Oral Given Maribell Catherine RN     05/26/2025 1356 EDT leucovorin (WELLCOVORIN) tablet 25 mg 25 mg Oral Given Ketan Rehman RN     05/26/2025 0903 EDT leucovorin (WELLCOVORIN) tablet 25 mg 25 mg Oral Given Ketan Rehman RN            /71 (BP Location: Right arm)   Pulse 76   Temp 97.5 °F (36.4 °C) (Oral)   Resp 17   Ht 5' 10\" (1.778 m)   Wt 74.6 kg (164 lb 7.4 oz)   SpO2 98%   BMI 23.60 kg/m²       Physical Exam  Constitutional:       Appearance: Normal appearance.   HENT:      Head: Normocephalic and atraumatic.     Cardiovascular:      Rate and Rhythm: Normal rate and regular rhythm.      Pulses: Normal pulses.      Heart sounds: Normal heart sounds.   Pulmonary:      Effort: Pulmonary effort is normal.      Breath sounds: Normal breath sounds.   Abdominal:      General: Abdomen is flat.      Palpations: Abdomen is soft.      Tenderness: There is no abdominal tenderness.     Skin:     General: Skin is warm and dry.     Neurological:      Mental Status: He is alert.       Recent Results (from the past 48 hours)   Fingerstick Glucose (POCT)    Collection Time: 05/25/25 10:44 AM   Result Value Ref Range    POC Glucose 236 (H) 65 - 140 mg/dl   Fingerstick Glucose (POCT)    Collection Time: 05/25/25  3:39 PM   Result Value Ref Range    POC Glucose 136 65 - 140 mg/dl   Methotrexate level    Collection Time: 05/25/25  5:57 PM   Result Value Ref Range    Methotrexate Lvl 0.11 umol/L   Fingerstick Glucose (POCT)    Collection Time: 05/25/25  8:25 PM   Result Value Ref Range    POC Glucose 137 65 - 140 mg/dl   CBC and differential    " Collection Time: 05/26/25  4:55 AM   Result Value Ref Range    WBC 5.38 4.31 - 10.16 Thousand/uL    RBC 3.08 (L) 3.88 - 5.62 Million/uL    Hemoglobin 9.6 (L) 12.0 - 17.0 g/dL    Hematocrit 28.6 (L) 36.5 - 49.3 %    MCV 93 82 - 98 fL    MCH 31.2 26.8 - 34.3 pg    MCHC 33.6 31.4 - 37.4 g/dL    RDW 14.7 11.6 - 15.1 %    MPV 10.1 8.9 - 12.7 fL    Platelets 207 149 - 390 Thousands/uL   Comprehensive metabolic panel    Collection Time: 05/26/25  4:55 AM   Result Value Ref Range    Sodium 143 135 - 147 mmol/L    Potassium 4.3 3.5 - 5.3 mmol/L    Chloride 105 96 - 108 mmol/L    CO2 28 21 - 32 mmol/L    ANION GAP 10 4 - 13 mmol/L    BUN 28 (H) 5 - 25 mg/dL    Creatinine 1.32 (H) 0.60 - 1.30 mg/dL    Glucose 125 65 - 140 mg/dL    Glucose, Fasting 125 (H) 65 - 99 mg/dL    Calcium 9.3 8.4 - 10.2 mg/dL    AST 52 (H) 13 - 39 U/L     (H) 7 - 52 U/L    Alkaline Phosphatase 73 34 - 104 U/L    Total Protein 5.9 (L) 6.4 - 8.4 g/dL    Albumin 3.7 3.5 - 5.0 g/dL    Total Bilirubin 0.70 0.20 - 1.00 mg/dL    eGFR 56 ml/min/1.73sq m   Manual Differential(PHLEBS Do Not Order)    Collection Time: 05/26/25  4:55 AM   Result Value Ref Range    Segmented % 46 43 - 75 %    Lymphocytes % 46 (H) 14 - 44 %    Monocytes % 0 (L) 4 - 12 %    Eosinophils % 0 0 - 6 %    Basophils % 1 0 - 1 %    Atypical Lymphocytes % 7 (H) <=0 %    Absolute Neutrophils 2.47 1.85 - 7.62 Thousand/uL    Absolute Lymphocytes 2.85 0.60 - 4.47 Thousand/uL    Absolute Monocytes 0.00 0.00 - 1.22 Thousand/uL    Absolute Eosinophils 0.00 0.00 - 0.40 Thousand/uL    Absolute Basophils 0.05 0.00 - 0.10 Thousand/uL    Total Counted      RBC Morphology Present     Platelet Estimate Adequate Adequate    Anisocytosis Present     Polychromasia Present    Fingerstick Glucose (POCT)    Collection Time: 05/26/25  6:19 AM   Result Value Ref Range    POC Glucose 124 65 - 140 mg/dl   Fingerstick Glucose (POCT)    Collection Time: 05/26/25 11:35 AM   Result Value Ref Range    POC Glucose  156 (H) 65 - 140 mg/dl   Fingerstick Glucose (POCT)    Collection Time: 05/26/25  3:51 PM   Result Value Ref Range    POC Glucose 97 65 - 140 mg/dl   Methotrexate level    Collection Time: 05/26/25  6:40 PM   Result Value Ref Range    Methotrexate Lvl <0.10 umol/L   Fingerstick Glucose (POCT)    Collection Time: 05/26/25  9:03 PM   Result Value Ref Range    POC Glucose 116 65 - 140 mg/dl   CBC and Platelet    Collection Time: 05/27/25  5:08 AM   Result Value Ref Range    WBC 3.21 (L) 4.31 - 10.16 Thousand/uL    RBC 2.24 (L) 3.88 - 5.62 Million/uL    Hemoglobin 6.8 (L) 12.0 - 17.0 g/dL    Hematocrit 21.2 (L) 36.5 - 49.3 %    MCV 95 82 - 98 fL    MCH 30.4 26.8 - 34.3 pg    MCHC 32.1 31.4 - 37.4 g/dL    RDW 14.7 11.6 - 15.1 %    Platelets 153 149 - 390 Thousands/uL    MPV 10.4 8.9 - 12.7 fL   Comprehensive metabolic panel    Collection Time: 05/27/25  5:08 AM   Result Value Ref Range    Sodium 139 135 - 147 mmol/L    Potassium 3.9 3.5 - 5.3 mmol/L    Chloride 104 96 - 108 mmol/L    CO2 32 21 - 32 mmol/L    ANION GAP 3 (L) 4 - 13 mmol/L    BUN 22 5 - 25 mg/dL    Creatinine 1.05 0.60 - 1.30 mg/dL    Glucose 100 65 - 140 mg/dL    Calcium 8.6 8.4 - 10.2 mg/dL    Corrected Calcium 9.2 8.3 - 10.1 mg/dL    AST 23 13 - 39 U/L     (H) 7 - 52 U/L    Alkaline Phosphatase 58 34 - 104 U/L    Total Protein 5.2 (L) 6.4 - 8.4 g/dL    Albumin 3.2 (L) 3.5 - 5.0 g/dL    Total Bilirubin 0.71 0.20 - 1.00 mg/dL    eGFR 74 ml/min/1.73sq m   Fingerstick Glucose (POCT)    Collection Time: 05/27/25  5:37 AM   Result Value Ref Range    POC Glucose 103 65 - 140 mg/dl       CT abdomen pelvis w contrast  Result Date: 5/19/2025  Narrative: CT ABDOMEN AND PELVIS WITH IV CONTRAST INDICATION: recurrent bacteremia, look for source. Blood culture and urine culture positive for gram-negative rods. COMPARISON: April 30, 2025. TECHNIQUE: CT examination of the abdomen and pelvis was performed. Multiplanar 2D reformatted images were created from the  source data. This examination, like all CT scans performed in the [facility], was performed utilizing techniques to minimize radiation dose exposure, including the use of iterative reconstruction and automated exposure control. Radiation dose length product (DLP) for this visit: 390.85 mGy-cm. IV Contrast: 85 mL of iohexol (OMNIPAQUE) Enteric Contrast: Not administered. FINDINGS: ABDOMEN LOWER CHEST: A small calcified granuloma is redemonstrated anteriorly at the right lung base. There is coronary artery disease. A catheter terminates in the right atrium. LIVER/BILIARY TREE: There is a 13 x 9 mm cyst posteriorly in the right lobe of the liver, similar to the prior study. GALLBLADDER: No calcified gallstones. No pericholecystic inflammatory change. SPLEEN: Unremarkable. PANCREAS: Unremarkable. ADRENAL GLANDS: Unremarkable. KIDNEYS/URETERS: Mild bilateral nonspecific perinephric stranding, increased somewhat compared with April 30, 2025. No hydronephrosis bilaterally. STOMACH AND BOWEL: No bowel obstruction. No pericolonic inflammatory change. APPENDIX: Normal. ABDOMINOPELVIC CAVITY: There is a small amount of free fluid, best demonstrated in the right lower quadrant and paracolic gutters. No pneumoperitoneum. VESSELS: Atherosclerosis. No abdominal aortic aneurysm. PELVIS REPRODUCTIVE ORGANS: Unremarkable for patient's age. URINARY BLADDER: Unremarkable. ABDOMINAL WALL/INGUINAL REGIONS: There is some gas within the subcutaneous fat of the anterior abdominal walls near the level of the umbilicus, left more so than right, possibly related to recent injections. Small fat-containing left inguinal hernia. BONES: No acute fracture or suspicious osseous lesion. Spinal degenerative changes, most notably at L5-S1.     Impression: There is mild bilateral nonspecific perinephric stranding, however, this does appear somewhat increased compared with April 30, 2025. Correlation with urinalysis and urine culture and  sensitivity is recommended. There is a small amount of free fluid. Other nonemergent findings as above. Workstation performed: [id number]     CT abdomen pelvis wo contrast  Result Date: 4/30/2025  Narrative: CT ABDOMEN AND PELVIS WITHOUT IV CONTRAST INDICATION: Gram-negative bacteremia, evaluate for source infection. COMPARISON: None. TECHNIQUE: CT examination of the abdomen and pelvis was performed without intravenous contrast. Multiplanar 2D reformatted images were created from the source data. This examination, like all CT scans performed in the [facility], was performed utilizing techniques to minimize radiation dose exposure, including the use of iterative reconstruction and automated exposure control. Radiation dose length product (DLP) for this visit: 560.94 mGy-cm. Enteric Contrast: Not administered. FINDINGS: ABDOMEN LOWER CHEST: Calcified granuloma in the right middle lobe. LIVER/BILIARY TREE: 1 cm cyst in the right lobe. Liver is otherwise unremarkable.1 GALLBLADDER: No calcified gallstones. No pericholecystic inflammatory change. SPLEEN: Unremarkable. PANCREAS: Unremarkable. ADRENAL GLANDS: Unremarkable. KIDNEYS/URETERS: Unremarkable. No hydronephrosis. STOMACH AND BOWEL: Large amount of formed stool throughout the entire colon. No evidence of obstruction. APPENDIX: Normal. ABDOMINOPELVIC CAVITY: No ascites. No pneumoperitoneum. No lymphadenopathy. VESSELS: Unremarkable for patient's age. PELVIS REPRODUCTIVE ORGANS: Unremarkable for patient's age. URINARY BLADDER: Unremarkable. ABDOMINAL WALL/INGUINAL REGIONS: Mild infiltration of the anterior abdominal wall in the left lower quadrant, possibly a soft tissue contusion. BONES: No acute fracture or suspicious osseous lesion.     Impression: No acute inflammatory process in the abdomen or pelvis. Large amount of stool throughout the colon. Workstation performed: [id number]     XR abdomen obstruction series  Result Date: 4/30/2025  Narrative:  XR ABDOMEN OBSTRUCTION SERIES INDICATION: Constipation, lethargy, gram-negative bacteremia. Confusion, newly diagnosed intracranial mass. Constipation. COMPARISON: MRI abdomen 3/30/2025 and CT chest abdomen pelvis 3/29/2025 FINDINGS: Large volume of colonic stool. Otherwise unremarkable bowel gas pattern. No pneumoperitoneum or abnormal air-fluid levels. No pathologic calcification or soft tissue mass. Bones are unremarkable for patient's age. Examination of the chest reveals clear lungs and a normal cardiomediastinal silhouette. Right PICC with tip projecting over the superior cavoatrial junction.     Impression: 1.  Large colonic stool burden without obstruction or other acute abdominal findings. 2.  No acute cardiopulmonary findings. Resident: CINTHYA FAJARDO I, the attending radiologist, have reviewed the images and agree with the final report above. Workstation performed: [id number]       I have personally reviewed labs, imaging studies, and pertinent reports.      This note has been generated by voice recognition software system.  Therefore, there may be spelling, grammar, and or syntax errors. Please contact if questions arise.

## 2025-05-27 NOTE — PROGRESS NOTES
05/27/25 0930   Pain Assessment   Pain Assessment Tool 0-10   Pain Score No Pain   Restrictions/Precautions   Precautions 1:1;Bed/chair alarms;Cognitive;Fall Risk;Limb alert;Multiple lines;Supervision on toilet/commode  (chemo precautions)   Weight Bearing Restrictions No   ROM Restrictions No   Comprehension   Comprehension (FIM) 4 - Understands basic info/conversation 75-90% of time   Expression   Expression (FIM) 4 - Expresses basic info/needs 75-90% of time   Social Interaction   Social Interaction (FIM) 5 - Interacts appropriately with others 90% of time   Problem Solving   Problem solving (FIM) 2 - Solves basic problems 25-49% of time   Memory   Memory (FIM) 2 - Recognizes, recalls/performs 25-49%   Speech/Language/Cognition Assessmetn   Treatment Assessment Pt seen for skilled speech therapy session targeting cognitive linguistic communication skills. Pt alert and interactive- completed the following skilled therapy tasks. Engaged in functional money task with adding two coin amounts. Pt was able to complete with 23/30acc increasing with verbal cues to check work and re-calculate. Benefitted as well with extended processing time. Reviewed plan for discharge pending medical stability. Naldo present as well at end of session and educated on this as well. All in agreement- pt is looking forward to going home soon. Pt overall is improving with skilled SLP services and will cont to benefit to maximize overall cognitive linguistic communication abilities at this time.   SLP Therapy Minutes   SLP Time In 0930   SLP Time Out 1000   SLP Total Time (minutes) 30   SLP Mode of treatment - Individual (minutes) 30   SLP Mode of treatment - Concurrent (minutes) 0   SLP Mode of treatment - Group (minutes) 0   SLP Mode of treatment - Co-treat (minutes) 0   SLP Mode of Treatment - Total time(minutes) 30 minutes   SLP Cumulative Minutes 645   Therapy Time missed   Time missed? No

## 2025-05-27 NOTE — DISCHARGE SUMMARY
"Discharge Summary - Hospitalist   Name: Alexis Dennison 64 y.o. male I MRN: [id number]  Unit/Bed#: -01 I Date of Admission: 5/7/2025   Date of Service: 5/28/2025 I Hospital Day: 21     Assessment & Plan  Primary central nervous system (CNS) lymphoma  Patient with development of worsening confusion, and was seen in the ED on 3/24/2025.  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  Lymphoma/leukemia panel was nondiagnostic  CTH 4/3/25: concerning for worsening hydrocephalus.   Repeat LP 4/7/25 = undiagnostic again   MRI brain 4/11/25: \"Interval enlargement of the dominant left anterior basal ganglionic mass lesion with greater surrounding vasogenic edema and mass effect. 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  S/p EVD, self removed 4/26  S/p Keppra 500mg BID x 7 days for postoperative seizure ppx   Started on chemotherapy during hospital stay and is for weekly Rituximab and Methotrexate every 2 weeks  S/p Dexamethasone taper = LD was on 5/19/25  Had Rituxan 5/10/25, 5/23/25  Did get Filgrastim 5/17 and 5/18  Was due for Methotrexate on 5/17 but + sepsis w/bacteremia.   ID cleared pt to get chemo and he did get the Methotrexate 5/21/25 and Decadron 10 mg IV x 1, Zofran 16mg IV  Renal was maintaining bicarb drip and pt getting q6hr UAs for urine pH --> bicarb drip stopped 5/23 and then switched to Isolyte @75ml/hr.   D/c'd NSS IV at 75/hr = 5/27  S/p Leucovorin q6hrs " x 6 doses   Methotrexate level was <0.10 on 5/26  Pt has an appt with Dr Phan on 6/3/25   H-O gave dose of Granix today 5/28 before discharge and will be setting him up to get a dose as OP tomorrow 5/29 and Friday 5/30  Type 2 diabetes mellitus with hyperglycemia, without long-term current use of insulin (HCC)  Hemoglobin A1C was 6.6 on 2/22/25  Sees Endo [facility] in [place]  Home:  Janumet XR  qd  Here: Januvia 50mg qd  Continue DM diet  On QID Accuchecks with SSI Algo 1  BSs have continued to improve with steroid being tapered  = LD was 5/19 5/17 creat bumped up to 1.38 so Metformin was placed on hold  Since creatinine was stable, restarted Metformin 1000 mg XR 5/22/25. (Last contrast study was 5/18/25).    Stopped the Lispro WM 5/21/25.    For home today:  Janumet XR  qd but not the extra 1000 mg of the Metformin that he had been taking in addition at home  Mixed hyperlipidemia  Continue atorvastatin  Did have slightly elevated LFT's on prior labs but most recently normal, consider holding statin if LFTs trend back up  Thyroid nodule  TSH/free T4 4/21/25 = 0.019/1.13  Repeat TSH/free T4 done 5/12 = TSH was 0.287 with free T4 0.85  TSH has normalized 5/21.  D/W Endo = curbsided Dr Marshall --> plan for OP US.  PCP will need to set that up at office visit   Pulmonary nodule  CT chest 3 months follow-up  Liver lesion  Patient will require outpatient follow-up  Encephalopathy  Continue Seroquel  S/p steroid taper- last dose was 5/19/25  On 1:1  LETY (acute kidney injury) (HCC)  Monitor status post methotrexate  Peak creat was 2.77 on 4/22/25  Previous baseline as OP 5/2023-2/22/25 was 1.1 to 1.0  CMP 5/9/25 with creatinine of 1.18 down from 1.23 on 5/8 and on 5/10 was 1.15  CMP on 5/12/25 showed creatinine stable at 1.1  5/17: creat up again to 1.38 = Being followed by Nephrology. Met sepsis criteria 5/17 and placed on IVF ordered as per sepsis protocol  5/19: creat trending down and was 1.20  "-->  IVF was  reduced to 50ml/hr per renal.  On 5/20/25-5/21/25, they want to continue same IVF  Cr 5/27 was 1.0 = stopped IVF  Sepsis (HCC)  Patient noted to have hypotension and tachycardia on 5/17  Sepsis workup ordered and found to have positive blood cultures again, growing E coli as before  ID reconsulted  Started Cefepime 5/17/25 = await blood urine cx results but preliminary results of urine >100,000 GNR and prelim blood cx = GNR with blood cx ID panel positive for E.coli  Continue IVF until methotrexate level < 0.1  He has had stable BP, tachycardia has resolved and he has had no fevers  Updated plan from ID as below  Recurrent E. coli bacteremia  Patient completed 7 days of IV cefazolin which was finished on May 5  CT of A/P was done 5/18 per recommendation from ID looking for possible reason for recurrent bacteremia = was unrevealing  Was on Ceftriaxone --> ID changed to Duricef to continue through 5/30/25 for a 14 days total of antibiotic tx  HTN (hypertension)  Home:  Losartan -25 qd  Here:  no meds   Stable BP today 5/28/25  Transaminitis  AST is 111, previously 114,  ALT is 322 previously 149  D/w H-O, they are aware =  \"Could be due to recent chemotherapy versus antibiotics versus liver lesions\".    CMP 5/9/25 = AST 57 (previously 111),  (previously 322)  Stable with  and AST 23  Leukopenia  WBC now down to 2.5 5/28 = got dose Granix today before discharge and will have a dose tomorrow 5/29 and 5/30 as an OP per H-O  Hyponatremia  resolved  Thrombocytopenia (HCC)  Per H-O  Platelets dropped to 66 on 5/18  On 5/19 was up to 79 --> 5/21 up to 144, 5/22 was 145 and today 207  For platelet transfusion if counts drop below 20K or in setting of any spontaneous bleeding   Platelet count today 5/28 is 140.  Anemia  Hgb dropped to 6.3 on 5/18 and was confirmed by repeat lab  at 6.7 so was transfused with one unit of PRBCs after discussion with Oncology  Spoke with wife over the phone and " "consent obtained  On 5/19/25, hemoglobin was up to 8.0 = appropriate response to transfusion  Hgb 6.8 with recheck 7.6 = d/w H-O and they wanted him to get a unit of blood leukoreduced and irradiated (d/w Dr Renteria who discussed with Dr Roberson).    Today 5/28/25, hemoglobin was 8.6     Discharge Summary   Alexis Dennison 64 y.o. male MRN: [id number]  Unit/Bed#: [facility] 964-01 Encounter: [id number]  Admission Date: 5/7/2025     Discharge Date: 5/28/25    Discharging Provider: Roseanne Castaneda    PCP:  [phone]      HPI: Refer to previous hospitalization for complete record    Summary of [facility] Course:     During the course of the patient's stay in [facility], he had a reoccurrence of E.coli bacteremia and sepsis from a UTI.  He was placed on Ceftriaxone then to Duricef.  He will complete the Duricef on 5/30 for a total of 14 days of antibiotic treatment.  At home, he takes Losartan HCT but he did not need to have that restarted here or for home at discharge.  He had transient transaminitis, thrombocytopenia and hyponatremia all of which resolved.      For his CNS lymphoma, while in [facility], he received Methotrexate on 5/21/25 and Rituxan 5/10 and 5/23.  He got a dose of Granix today 5/28/25 and will be receiving a dose tomorrow 5/29 and 5/30.  He was followed by Nephrology while here.  Creatinine did rise to 1.38.  He was given IV hydration.  Creatinine on 5/27 was back down to 1.0.    He will need an ultrasound of the thyroid to evaluate a nodule.  This will need to be set up by his PCP.  He also has a 4mm RLL lung nodule and a liver lesion which will need future followup as well.    Patient was discharged to home with wife in stable condition on 5/28/25.    Discharge Physical Examination:  /82 (BP Location: Right arm)   Pulse 78   Temp 97.9 °F (36.6 °C) (Oral)   Resp 20   Ht 5' 10\" (1.778 m)   Wt 75 kg (165 lb 6.4 oz)   SpO2 96%   BMI 23.73 kg/m²     General Appearance: no distress, nontoxic appearing  HEENT:  " External ear normal.  Nose normal w/o drainage. Mucous membranes are moist. Oropharynx is clear. Conjunctiva clear w/o icterus or redness.  Neck:    Lungs: BBS without crackles/wheeze/rhonchi; respirations unlabored with normal inspiratory/expiratory effort.  No retractions noted.  On RA  CV: regular rate and rhythm; no rubs/murmurs/gallops, PMI normal   ABD: Abdomen is soft.  Bowel sounds all quadrants.  Nontender with no distention.    Skin: normal turgor, normal texture  Psych: affect normal, mood normal  Neuro: AA     Significant Findings, Care, Treatment and Services Provided: Acute comprehensive interdisciplinary inpatient rehabilitation including PT, OT, SLP, RN, CM.      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  > Started on high dose steroids and methotrexate every 2 weeks for 4 weeks (C1 received 4/18, C2 received 5/2) then maintenance every 4 weeks with Rituximab weekly for 8 weeks (1st dose Thursday 4/24 and second 5/3)  > Admitted to [facility] with ongoing cognitive deficits and delirium  > Completed Decadron taper on 5/19     MTX level <0.10 prior to discharge and hemoglobin stable at 8.6 after transfusion. Follow up with oncology outpatient for planned MTX + Rituximab treatments.     Ambulatory dysfunction  - Continue with HH PT/OT    Encephalopathy  > Patient has been pleasantly confused for months in the setting of brain mass.  > acutely worsened due to UTI and bacteremia. S/p 7 day course of antibx  > Continues to lack  insight to his current situation and is severely cognitively impaired.  He does not get agitated for long periods of time and is mostly confused and impulsive.  > A&O 1 on [facility] admission     Mood has been stable during [facility] course. Will discharge on seroquel 12.5mg BID.    E. coli UTI  > Pet met sepsis criteria on 4/29 with confusion and agitation. UA was positive. Urine culture and blood cultures showed Ecoli sensitive to cephalosporins.  > ID consulted and pt completed 7d course of ceftriaxone  > Approved to restart chemo 5/2.  > Chemo on hold for GNR UTI and bacteremia > restarted chemo on 5/21     - Discharging on PO Duricef - follow up with ID as needed    Impaired mobility and activities of daily living  Patient was evaluated by the rehabilitation team MD and an appropriate candidate for acute inpatient rehabilitation program at this time.  The patient will tolerate 3 hours/day 5 to 7 days/week of intensive physical, occupational in order to obtain goals for community discharge  Due to the patient's functional Compared to their baseline level of function in addition to their ongoing medical needs, the patient would benefit from daily supervision from a rehabilitation physician as well as rehabilitation nursing to implement and adjust the medical as well as functional plan of care in order to meet the patient's goals.    Patient progressed well and made good functional gains during inpatient rehabilitation course. Patient will continue with  PT/OT/SLP services upon discharge to continue to work on endurance, strength, balance and coordination.      Acute Rehabilitation Center Course: Patient participated in a comprehensive interdisciplinary inpatient rehabilitation program which included involvment of Rehab MD, therapies (PT, OT, and SLP), RN, CM     Etiologic/Rehabilitation Diagnosis: Impairment of mobility, safety and Activities of Daily Living (ADLs) due to Brain Dysfunction:  02.1   Non-Traumatic    Functional Status Upon Admission to [facility]:  Physical Therapy: modA bed mobility, modA transfers, modA ambulation (150') with RW  Occupational Therapy: sup eating, modA oral care/UBD/toileting, maxA bathing/LBD/footwear    Functional Status Upon Discharge from [facility]:   Physical therapy: sup bed mobility, sup transfers, sup ambulation 250' without AD, sup stairs x12  Occupational therapy: setup eating, sup grooming/UBD/LBD, sup-CGA bathing/toileting    Condition at Discharge: stable     Discharge instructions/Information to patient and family:   See after visit summary for information provided to patient and family.      Provisions for Follow-Up Care:  See after visit summary for information related to follow-up care and any pertinent home health orders.      Future Appointments   Date Time Provider Department Center   6/3/2025  8:40 AM Leta Phan MD HEM ON[facility]  [facility]   10/2/2025  7:20 AM Edvin Dior PA-C [facility]           Disposition: Home    Planned Readmission: No    Discharge Statement   I spent 60 minutes or more discharging the patient.  I had direct contact with the patient on the day of discharge. Greater than 50% of the total time was spent examining patient, answering all patient questions, arranging and discussing plan of care with patient and care team as well as directly providing post-discharge instructions. Additional time then spent on discharge activities.    Discharge Medications:  See after visit summary for reconciled discharge medications provided to patient and family.

## 2025-05-27 NOTE — ASSESSMENT & PLAN NOTE
5/19 - hemoglobin of 8.0 - improved from 6.7 after transfusion of 1u pRBC over the weekend  5/23- Hgb 9.9  -- improved from 8.9  5/27- Hgb 6.8 this morning with repeat 7.6 - Onc recommends 1u leukoreduced and irradiated pRBC

## 2025-05-27 NOTE — ASSESSMENT & PLAN NOTE
Per H-O  Platelets dropped to 66 on 5/18  On 5/19 was up to 79 --> 5/21 up to 144, 5/22 was 145 and today 207  For platelet transfusion if counts drop below 20K or in setting of any spontaneous bleeding   Platelet count today 5/28 is 140.

## 2025-05-27 NOTE — ASSESSMENT & PLAN NOTE
WBC now down to 2.5 5/28 = got dose Granix today before discharge and will have a dose tomorrow 5/29 and 5/30 as an OP per H-O

## 2025-05-27 NOTE — PROGRESS NOTES
"OT Daily Treatment Note     05/27/25 0700   Pain Assessment   Pain Assessment Tool 0-10   Pain Score   (Pt expressed discomfort in bilateral feet when donning compression stockings. Specifically identified pain in the heel in the L foot. No score noted.)   Pain Location/Orientation Location: Foot   Restrictions/Precautions   Precautions 1:1;Bed/chair alarms;Cognitive;Fall Risk;Limb alert;Multiple lines;Supervision on toilet/commode;Other (comment)  (Chemo precautions)   Weight Bearing Restrictions No   ROM Restrictions No   Lifestyle   Autonomy \"It seems like I'm having a rough day.\"   Eating   Type of Assistance Needed Independent   Physical Assistance Level No physical assistance   Comment Seated in recliner. Pt able to eat independently.   Eating CARE Score 6   Oral Hygiene   Type of Assistance Needed Supervision   Physical Assistance Level No physical assistance   Comment In stance at sink without LOB. No VCs required to locate items.   Oral Hygiene CARE Score 4   Shower/Bathe Self   Type of Assistance Needed Supervision   Physical Assistance Level No physical assistance   Comment SB EOB. Pt able to wash 10/10 body parts. Pt able to stand to wash chavo area and rear without LOB. Pt attempted to wash LB in stance, and required VCs for safety.   Shower/Bathe Self CARE Score 4   Upper Body Dressing   Type of Assistance Needed Supervision   Physical Assistance Level No physical assistance   Comment Seated EOB. Pt able to don and doff shirt with assistance with IV management from nurse.   Upper Body Dressing CARE Score 4   Lower Body Dressing   Type of Assistance Needed Supervision   Physical Assistance Level No physical assistance   Comment Pt donned briefs in over the legs sitting EOB and stood to pull over hips. Pt donned pants while standing. Required sup for safety.   Lower Body Dressing CARE Score 4   Putting On/Taking Off Footwear   Type of Assistance Needed Supervision   Physical Assistance Level No physical " assistance   Comment Seated EOB. Pt able to don and doff socks, and don shoes.   Putting On/Taking Off Footwear CARE Score 4   Lying to Sitting on Side of Bed   Type of Assistance Needed Supervision   Physical Assistance Level No physical assistance   Comment Pt able to move from supine to sitting EOB without use of bedrail.   Lying to Sitting on Side of Bed CARE Score 4   Sit to Stand   Type of Assistance Needed Supervision   Physical Assistance Level No physical assistance   Comment No AD. Pt able to stand without LOB.   Sit to Stand CARE Score 4   Bed-Chair Transfer   Type of Assistance Needed Supervision   Physical Assistance Level No physical assistance   Comment No AD. Pt able to complete transfer without LOB.   Chair/Bed-to-Chair Transfer CARE Score 4   Toileting Hygiene   Type of Assistance Needed Supervision   Physical Assistance Level No physical assistance   Comment In stance. Pt able to manage clothing and stand witout LOB.   Toileting Hygiene CARE Score 4   Toilet Transfer   Type of Assistance Needed Supervision   Physical Assistance Level No physical assistance   Comment In stance. No AD.   Toilet Transfer CARE Score 4   Functional Standing Tolerance   Time 20 minutes   Activity Obstacle course   Comments See Cognition   Exercise Tools   UE Ergometer Pt engaged in UB strengthening using UE ergometer for 5 min prograde and 5 min retrograde to address UB strengthening, UB coordination, UE ROM, activity tolerance, and endurance. Pt required short rest break between directions.   Cognition   Overall Cognitive Status Impaired   Arousal/Participation Alert;Cooperative;Responsive   Attention Difficulty attending to directions   Orientation Level Oriented to person;Disoriented to place;Disoriented to time;Disoriented to situation   Memory Decreased long term memory;Decreased short term memory;Decreased recall of recent events;Decreased recall of precautions   Following Commands Follows one step commands with  increased time or repetition   Comments Pt engaged ADL session. Required VC for safety when bathing and dressing. Pt required cues for line management, and line location before sitting. Pt then engaged in a 5-part obstacle course to focus on activity tolerance, endurance, short-term memory, following commands, problem-solving, visual-motor integration, and gross motor coordination.Obstacle course consisted of sit-to-stand, ball catching, information recall, side steps, bean bag toss, and cone stacking. Pt required VCs for recall of 6 ice cream flavors and 6 fish species, and required cues for cone stacking. Pt completed obstacle course 2x.   Activity Tolerance   Activity Tolerance Patient tolerated treatment well   Assessment   Treatment Assessment Pt engaged in a 90-minute skilled OT session with focus on ADL peformance, UB strengthening, UE ROM, endurance, standing tolerance, activity tolerance, visual-motor integration, gross motor coordination, dynamic standing balance, following commands, information recall, problem-solving, and short-term memory.Pt engaged in ADLs with cues for safety.Pt demonstrated limitations in safety awareness during bathing and dressing, as well as awareness of his IV. Pt also engaged in UB strengthening with UE ergometer with no noted fatigue. Last, pt participated in a 5-part obstacle course. Pt demonstrated good balance during sit-to-stand, ball catching, side stepping over a bumper, bean bag toss, and cone stacking, requiring supervision only. Pt required cues for sequencing the obstacle components. When staking cones, pt demonstrated confusion with instructions following verbal and visual demonstrations. When asked to name ice cream and fish types, pt required cues to redirect to the topic of focus. Pt would benefit from continued OT services to address the aforementioned skills.   Barriers to Discharge Other (Comment)  (Medical-related barriers to discharge)   Barriers to Discharge  Comments Medical-related barriers to d/c   OT Therapy Minutes   OT Time In 0700   OT Time Out 0830   OT Total Time (minutes) 90   OT Mode of treatment - Individual (minutes) 90   OT Mode of treatment - Concurrent (minutes) 0   OT Mode of treatment - Group (minutes) 0   OT Mode of treatment - Co-treat (minutes) 0   OT Mode of Treatment - Total time(minutes) 90 minutes   OT Cumulative Minutes 1285   Therapy Time missed   Time missed? No

## 2025-05-27 NOTE — ASSESSMENT & PLAN NOTE
Hemoglobin A1C was 6.6 on 2/22/25  Sees Endo [facility] in [place]  Home:  Janumet XR  qd  Here: Januvia 50mg qd  Continue DM diet  On QID Accuchecks with SSI Algo 1  BSs have continued to improve with steroid being tapered  = LD was 5/19 5/17 creat bumped up to 1.38 so Metformin was placed on hold  Since creatinine was stable, restarted Metformin 1000 mg XR 5/22/25. (Last contrast study was 5/18/25).    Stopped the Lispro WM 5/21/25.    For home today:  Janumet XR  qd but not the extra 1000 mg of the Metformin that he had been taking in addition at home

## 2025-05-28 ENCOUNTER — TELEPHONE (OUTPATIENT)
Dept: HEMATOLOGY ONCOLOGY | Facility: CLINIC | Age: 65
End: 2025-05-28

## 2025-05-28 VITALS
SYSTOLIC BLOOD PRESSURE: 131 MMHG | DIASTOLIC BLOOD PRESSURE: 82 MMHG | RESPIRATION RATE: 20 BRPM | HEIGHT: 70 IN | TEMPERATURE: 97.9 F | BODY MASS INDEX: 23.68 KG/M2 | WEIGHT: 165.4 LBS | OXYGEN SATURATION: 96 % | HEART RATE: 78 BPM

## 2025-05-28 DIAGNOSIS — B96.20 E. COLI UTI: ICD-10-CM

## 2025-05-28 DIAGNOSIS — N39.0 E. COLI UTI: ICD-10-CM

## 2025-05-28 PROBLEM — D70.1 CHEMOTHERAPY INDUCED NEUTROPENIA (HCC): Status: ACTIVE | Noted: 2025-05-28

## 2025-05-28 PROBLEM — T45.1X5A CHEMOTHERAPY INDUCED NEUTROPENIA (HCC): Status: ACTIVE | Noted: 2025-05-28

## 2025-05-28 LAB
ABO GROUP BLD BPU: NORMAL
BASOPHILS # BLD AUTO: 0.01 THOUSANDS/ÂΜL (ref 0–0.1)
BASOPHILS NFR BLD AUTO: 0 % (ref 0–1)
BPU ID: NORMAL
CROSSMATCH: NORMAL
EOSINOPHIL # BLD AUTO: 0.04 THOUSAND/ÂΜL (ref 0–0.61)
EOSINOPHIL NFR BLD AUTO: 2 % (ref 0–6)
ERYTHROCYTE [DISTWIDTH] IN BLOOD BY AUTOMATED COUNT: 15.4 % (ref 11.6–15.1)
GLUCOSE SERPL-MCNC: 201 MG/DL (ref 65–140)
GLUCOSE SERPL-MCNC: 96 MG/DL (ref 65–140)
HCT VFR BLD AUTO: 25 % (ref 36.5–49.3)
HGB BLD-MCNC: 8.6 G/DL (ref 12–17)
IMM GRANULOCYTES # BLD AUTO: 0.05 THOUSAND/UL (ref 0–0.2)
IMM GRANULOCYTES NFR BLD AUTO: 2 % (ref 0–2)
LYMPHOCYTES # BLD AUTO: 1.7 THOUSANDS/ÂΜL (ref 0.6–4.47)
LYMPHOCYTES NFR BLD AUTO: 67 % (ref 14–44)
MCH RBC QN AUTO: 30.5 PG (ref 26.8–34.3)
MCHC RBC AUTO-ENTMCNC: 34.4 G/DL (ref 31.4–37.4)
MCV RBC AUTO: 89 FL (ref 82–98)
MONOCYTES # BLD AUTO: 0.1 THOUSAND/ÂΜL (ref 0.17–1.22)
MONOCYTES NFR BLD AUTO: 4 % (ref 4–12)
NEUTROPHILS # BLD AUTO: 0.62 THOUSANDS/ÂΜL (ref 1.85–7.62)
NEUTS SEG NFR BLD AUTO: 25 % (ref 43–75)
NRBC BLD AUTO-RTO: 1 /100 WBCS
PLATELET # BLD AUTO: 140 THOUSANDS/UL (ref 149–390)
PMV BLD AUTO: 9.8 FL (ref 8.9–12.7)
RBC # BLD AUTO: 2.82 MILLION/UL (ref 3.88–5.62)
UNIT DISPENSE STATUS: NORMAL
UNIT PRODUCT CODE: NORMAL
UNIT PRODUCT VOLUME: 350 ML
UNIT RH: NORMAL
WBC # BLD AUTO: 2.52 THOUSAND/UL (ref 4.31–10.16)

## 2025-05-28 PROCEDURE — 99232 SBSQ HOSP IP/OBS MODERATE 35: CPT | Performed by: INTERNAL MEDICINE

## 2025-05-28 PROCEDURE — 99239 HOSP IP/OBS DSCHRG MGMT >30: CPT | Performed by: NURSE PRACTITIONER

## 2025-05-28 PROCEDURE — 85025 COMPLETE CBC W/AUTO DIFF WBC: CPT | Performed by: NURSE PRACTITIONER

## 2025-05-28 PROCEDURE — 82948 REAGENT STRIP/BLOOD GLUCOSE: CPT

## 2025-05-28 RX ORDER — CEFADROXIL 500 MG/1
1000 CAPSULE ORAL EVERY 12 HOURS SCHEDULED
Qty: 10 CAPSULE | Refills: 0 | Status: SHIPPED | OUTPATIENT
Start: 2025-05-28 | End: 2025-05-31

## 2025-05-28 RX ORDER — ALLOPURINOL 300 MG/1
300 TABLET ORAL DAILY
Qty: 30 TABLET | Refills: 0 | Status: SHIPPED | OUTPATIENT
Start: 2025-05-28

## 2025-05-28 RX ORDER — OMEPRAZOLE 20 MG/1
20 CAPSULE, DELAYED RELEASE ORAL DAILY
Start: 2025-05-28

## 2025-05-28 RX ADMIN — INSULIN LISPRO 1 UNITS: 100 INJECTION, SOLUTION INTRAVENOUS; SUBCUTANEOUS at 11:28

## 2025-05-28 RX ADMIN — CHLORHEXIDINE GLUCONATE 15 ML: 1.2 SOLUTION ORAL at 08:39

## 2025-05-28 RX ADMIN — ATORVASTATIN CALCIUM 20 MG: 20 TABLET, FILM COATED ORAL at 08:39

## 2025-05-28 RX ADMIN — CEFADROXIL 1000 MG: 500 CAPSULE ORAL at 08:40

## 2025-05-28 RX ADMIN — ALLOPURINOL 300 MG: 300 TABLET ORAL at 08:39

## 2025-05-28 RX ADMIN — SENNOSIDES 17.2 MG: 8.6 TABLET, FILM COATED ORAL at 11:26

## 2025-05-28 RX ADMIN — PANTOPRAZOLE SODIUM 40 MG: 40 TABLET, DELAYED RELEASE ORAL at 05:41

## 2025-05-28 RX ADMIN — SITAGLIPTIN 50 MG: 50 TABLET, FILM COATED ORAL at 08:39

## 2025-05-28 RX ADMIN — FILGRASTIM-AAFI 300 MCG: 300 INJECTION, SOLUTION SUBCUTANEOUS at 13:45

## 2025-05-28 RX ADMIN — METFORMIN ER 500 MG 1000 MG: 500 TABLET ORAL at 08:40

## 2025-05-28 RX ADMIN — DOCUSATE SODIUM 100 MG: 100 CAPSULE, LIQUID FILLED ORAL at 08:39

## 2025-05-28 RX ADMIN — QUETIAPINE 12.5 MG: 25 TABLET ORAL at 11:26

## 2025-05-28 RX ADMIN — HEPARIN SODIUM 5000 UNITS: 5000 INJECTION INTRAVENOUS; SUBCUTANEOUS at 05:41

## 2025-05-28 NOTE — ASSESSMENT & PLAN NOTE
Likely multifactorial.  Hemoglobin has been ranging around 9 until this morning when noted 6.8.  Repeat H&H with hemoglobin 7.6, recommend further transfusion of leukoreduced and irradiated prior to discharge.  Hb 8.6 improved with transfusion.

## 2025-05-28 NOTE — ASSESSMENT & PLAN NOTE
Patient noted with WBC count trended down to 2.52, .  Will order Granix 300 mcg today, and arrange 2 additional doses for 5/29 and 5/30 preferably at [facility] infusion [place] per patient's request.

## 2025-05-28 NOTE — NURSING NOTE
Discharged instructions reviewed with patient's spouse as well as daughter on the phone, all questions and concerns addressed. Filgrastim sub-q inj. given prior to discharge, LUE PICC also removed. Patient discharging home with full supervision provider by wife/family members and home health VNA PT, OT, SLP as well as outpatient appts to endocrinology, infusion therapy, and hematology-oncology. Patient escorted out via wheelchair accompanied by wife and PCA.

## 2025-05-28 NOTE — ASSESSMENT & PLAN NOTE
Patient's wife is in the room.    64 yoM with PMHx of DM2, NAFLD, and HT who presented with progressive encephalopathy found to have multiple scattered nodular cerebral and cerebellar enhancement who underwent two non-diagnostic LPs prompting stereotactic brain biopsy (4/14) which showed large B-cell lymphoma.    Treatment Plan: regimen modified from (https://pubmed.ncbi.nlm.nih.gov/14226340/)     Biweekly HD-MTX/rituximab (8 g/m(2)/dose; 375 mg/m(2)/dose) for 4-6 cycles (induction) following with every 4 weeks HD-MTX (8 g/m(2)/dose) for 4 cycles (maintenance).     C1 (4/17) 8 g/m²  C2 (5/2) 3 g/m², dose-reduced due to LETY, delayed x1 day due to E. coli bacteremia.  C3 (was planned for 5/17) delayed due to E. coli bacteremia, s/p MTX 6 g/m2 on 5/21. Rituxan on 5/23/25.  Recommendations:  Urine pH remained above 7.0.  24-hour post administration methotrexate 5.0, patient started on IV leucovorin, methotrexate level 48 hours came back at 0.4, leucovorin is transition to oral, continue same and have extended to 6 additional dose.    Continue to monitor methotrexate level, 5.10-->.40--> 0.25-->0.11--> 0.10 from 5/26/25.  Methotrexate level have appropriately trended down, no further IV fluids or urine pH or methotrexate check needed.  Status post Rituxan on 5/23/2025. Mild transaminitis noted, no dose adjustment required for Rituxan.

## 2025-05-28 NOTE — TELEPHONE ENCOUNTER
I phoned the patient's daughter, Elizabeth, introduced myself, and indicated that I was calling from [facility] regarding her Dad's upcoming appointments. We reviewed that he will have 2 days of Granix injections: tomorrow at 0900 and Friday 5/30 at 1200, both at the [facility] infusion office. The office address was reviewed with Elizabeth. Additionally, we reviewed the Dr. Phan appointment is on 6/3 at 0840 in the [facility] office. Elizabeth indicated that she and her Dad and her Dad's wife were aware of the Dr. Phan appointment and that she would review the infusion appointment information with them. She was appreciative of the call.

## 2025-05-28 NOTE — PROGRESS NOTES
"Physical Medicine and Rehabilitation Progress Note  Alexis Dennison 64 y.o. male MRN: [id number]  Unit/Bed#: [facility] 964-01 Encounter: [id number]    Chief Complaint:  {ARC Chief Complaint:15343}      Interval History: {stsub:47476::\"No acute events overnight. \",\"Denies any CP or SOB.\"}    Assessment/Plan - Continue plan of care as per primary attending, unless otherwise stated below:      {ARC Call:59099}    {adptimespent:75290}    Ashley de Padua, MD  Physical Medicine and Rehabilitation      Review of Systems: A 10 point review of systems was negative except for what is noted in the HPI.      Current Medications[1]    Physical Exam:  Temp:  [97.7 °F (36.5 °C)-97.9 °F (36.6 °C)] 97.9 °F (36.6 °C)  HR:  [78-82] 78  Resp:  [16-20] 20  BP: (118-131)/(73-82) 131/82  SpO2:  [96 %-99 %] 96 %      Gen: No acute distress, Well-nourished, well-appearing.  HEENT: Moist mucus membranes, Normocephalic/Atraumatic  Cardiovascular: Regular rate, rhythm, S1/S2. Distal pulses palpable  Heme/Extr: No edema/clubbing/cyanosis  Pulmonary: Non-labored breathing. Lungs CTAB  : No connell  GI: Soft, non-tender, non-distended. BS+  MSK: ROM is WFL in all extremities. No effusions or deformities. Bulk is symmetric. See below for MMT scores.   Integumentary: Skin is warm, dry. No rashes or ulcers.  Neuro: AAOx3, CN 2-12 intact. Sensation intact to light touch throughout. Speech is intact. Appropriate to questioning. Tone is normal.    DTRs:   Coord:     MMT:   Strength:   Right  Left  Site  Right  Left  Site    5 5  S Ab: Shoulder Abductors  5  5  HF: Hip Flexors    5 5  EF: Elbow Flexors  5  5 KF: Knee Flexors    5  5  EE: Elbow Extensors  5  5  KE: Knee Extensors    5  5  WE: Wrist Extensors  5  5  DR: Dorsi Flexors    5  5  FF: Finger Flexors  5  5  PF: Plantar Flexors    5  5  HI: Hand Intrinsics  5  5  EHL: Extensor Hallucis Longus   Psych: Normal mood and affect.     Laboratory:  {reviewed:45923}  Results from last 7 days   Lab Units " 05/28/25  0549 05/27/25  1023 05/27/25  0508 05/26/25  0455   HEMOGLOBIN g/dL 8.6* 7.6* 6.8* 9.6*   HEMATOCRIT % 25.0* 23.8* 21.2* 28.6*   WBC Thousand/uL 2.52*  --  3.21* 5.38     Results from last 7 days   Lab Units 05/27/25  0508 05/26/25  0455 05/25/25  0544   BUN mg/dL 22 28* 23   SODIUM mmol/L 139 143 139   POTASSIUM mmol/L 3.9 4.3 3.8   CHLORIDE mmol/L 104 105 102   CREATININE mg/dL 1.05 1.32* 1.21   AST U/L 23 52* 90*   ALT U/L 103* 181* 194*            Diagnostic Studies: {reviewed:88851}   CT abdomen pelvis w contrast   Final Result by Holli Caban MD (05/19 0049)      There is mild bilateral nonspecific perinephric stranding, however, this does appear somewhat increased compared with April 30, 2025. Correlation with urinalysis and urine culture and sensitivity is recommended.      There is a small amount of free fluid.      Other nonemergent findings as above.         Workstation performed: [id number]               ** Please Note: Fluency Direct voice to text software may have been used in the creation of this document. **         [1]   Current Facility-Administered Medications:     acetaminophen (TYLENOL) tablet 650 mg, 650 mg, Oral, Q6H PRN, Crispin Arana MD, 650 mg at 05/16/25 0844    allopurinol (ZYLOPRIM) tablet 300 mg, 300 mg, Oral, Daily, Crispin Arana MD, 300 mg at 05/27/25 0844    alteplase (CATHFLO) injection 2 mg, 2 mg, Intracatheter, Q1MIN PRN, Crispin Arana MD    alteplase (CATHFLO) injection 2 mg, 2 mg, Intracatheter, Q1MIN PRN, Magali Lee MD    alteplase (CATHFLO) injection 2 mg, 2 mg, Intracatheter, Q1MIN PRN, Satnam Kebede MD    alteplase (CATHFLO) injection 2 mg, 2 mg, Intracatheter, Q1MIN PRN, Satnam Kebede MD    alteplase (CATHFLO) injection 2 mg, 2 mg, Intracatheter, Q1MIN PRN, Satnam Kebede MD    aluminum-magnesium hydroxide-simethicone (MAALOX) oral suspension 30 mL, 30 mL, Oral, Q4H PRN, Crispin Arana MD, 30 mL at 05/26/25 1020    atorvastatin (LIPITOR) tablet 20  mg, 20 mg, Oral, Daily, Crispin Arana MD, 20 mg at 05/27/25 0844    bisacodyl (DULCOLAX) rectal suppository 10 mg, 10 mg, Rectal, Daily PRN, Skyler Hendrickson MD    calcium carbonate (TUMS) chewable tablet 1,000 mg, 1,000 mg, Oral, Daily PRN, Crispin Arana MD    cefadroxil (DURICEF) capsule 1,000 mg, 1,000 mg, Oral, Q12H CAIT, Lisa Singh MD, 1,000 mg at 05/27/25 2040    chlorhexidine (PERIDEX) 0.12 % oral rinse 15 mL, 15 mL, Mouth/Throat, Q12H CAIT, Crispin Arana MD, 15 mL at 05/27/25 2040    docusate sodium (COLACE) capsule 100 mg, 100 mg, Oral, BID, Jessica Arreola DO, 100 mg at 05/27/25 1803    heparin (porcine) subcutaneous injection 5,000 Units, 5,000 Units, Subcutaneous, Q8H CAIT, Crispin Arana MD, 5,000 Units at 05/28/25 0541    insulin lispro (HumALOG/ADMELOG) 100 units/mL subcutaneous injection 1-5 Units, 1-5 Units, Subcutaneous, TID AC, 1 Units at 05/26/25 1248 **AND** Fingerstick Glucose (POCT), , , TID AC, PATI Porras    insulin lispro (HumALOG/ADMELOG) 100 units/mL subcutaneous injection 1-5 Units, 1-5 Units, Subcutaneous, HS, PATI Porras, 1 Units at 05/23/25 2132    lactulose (CHRONULAC) oral solution 20 g, 20 g, Oral, Daily PRN, Joshua Kaminski DO, 20 g at 05/14/25 1805    melatonin tablet 6 mg, 6 mg, Oral, HS, Crispin Arana MD, 6 mg at 05/27/25 2040    metFORMIN (GLUCOPHAGE-XR) 24 hr tablet 1,000 mg, 1,000 mg, Oral, Daily, PATI Porras, 1,000 mg at 05/26/25 0903    OLANZapine (ZyPREXA) IM injection 5 mg, 5 mg, Intramuscular, BID PRN, Crispin Arana MD    ondansetron (ZOFRAN) injection 4 mg, 4 mg, Intravenous, Q6H PRN, Crispin Arana MD    oxyCODONE (ROXICODONE) split tablet 2.5 mg, 2.5 mg, Oral, Q4H PRN, 2.5 mg at 05/16/25 0844 **OR** oxyCODONE (ROXICODONE) IR tablet 5 mg, 5 mg, Oral, Q4H PRN, Crispin Arana MD, 5 mg at 05/18/25 0221    pantoprazole (PROTONIX) EC tablet 40 mg, 40 mg, Oral, Early Morning, Crispin Arana MD, 40 mg at 05/28/25  0541    polyethylene glycol (MIRALAX) packet 17 g, 17 g, Oral, Daily PRN, Skyler Hendrickson MD    QUEtiapine (SEROquel) tablet 12.5 mg, 12.5 mg, Oral, Daily, Crispin Arana MD, 12.5 mg at 05/27/25 1157    QUEtiapine (SEROquel) tablet 12.5 mg, 12.5 mg, Oral, HS, Joshua Kaminski DO, 12.5 mg at 05/27/25 2047    senna (SENOKOT) tablet 17.2 mg, 2 tablet, Oral, Daily With Lunch, Jessica Arreola DO, 17.2 mg at 05/27/25 1157    sitaGLIPtin (JANUVIA) tablet 50 mg, 50 mg, Oral, Daily, PATI Porras, 50 mg at 05/27/25 0821

## 2025-05-28 NOTE — PROGRESS NOTES
Progress Note - Oncology-Medical   Name: Alexis Dennison 64 y.o. male I MRN: [id number]  Unit/Bed#: -01 I Date of Admission: 5/7/2025   Date of Service: 5/28/2025 I Hospital Day: 21     Assessment & Plan  Primary central nervous system (CNS) lymphoma  Patient's wife is in the room.    64 yoM with PMHx of DM2, NAFLD, and HT who presented with progressive encephalopathy found to have multiple scattered nodular cerebral and cerebellar enhancement who underwent two non-diagnostic LPs prompting stereotactic brain biopsy (4/14) which showed large B-cell lymphoma.    Treatment Plan: regimen modified from (https://pubmed.ncbi.nlm.nih.gov/08173188/)     Biweekly HD-MTX/rituximab (8 g/m(2)/dose; 375 mg/m(2)/dose) for 4-6 cycles (induction) following with every 4 weeks HD-MTX (8 g/m(2)/dose) for 4 cycles (maintenance).     C1 (4/17) 8 g/m²  C2 (5/2) 3 g/m², dose-reduced due to LETY, delayed x1 day due to E. coli bacteremia.  C3 (was planned for 5/17) delayed due to E. coli bacteremia, s/p MTX 6 g/m2 on 5/21. Rituxan on 5/23/25.  Recommendations:  Urine pH remained above 7.0.  24-hour post administration methotrexate 5.0, patient started on IV leucovorin, methotrexate level 48 hours came back at 0.4, leucovorin is transition to oral, continue same and have extended to 6 additional dose.    Continue to monitor methotrexate level, 5.10-->.40--> 0.25-->0.11--> 0.10 from 5/26/25.  Methotrexate level have appropriately trended down, no further IV fluids or urine pH or methotrexate check needed.  Status post Rituxan on 5/23/2025. Mild transaminitis noted, no dose adjustment required for Rituxan.    Thyroid nodule  Patient incidentally found to have thyroid nodule, was recommended ultrasound thyroid.  Pulmonary nodule  CTA from 3/29/2025 with nonspecific 4 mm right lower lobe pulmonary nodule, recommended 3 months follow-up.  Transaminitis  Likely secondary to methotrexate. Continue to monitor.   Thrombocytopenia (HCC)  Counts are  being monitored.  Anemia  Likely multifactorial.  Hemoglobin has been ranging around 9 until this morning when noted 6.8.  Repeat H&H with hemoglobin 7.6, recommend further transfusion of leukoreduced and irradiated prior to discharge.  Hb 8.6 improved with transfusion.   Chemotherapy induced neutropenia (HCC)  Patient noted with WBC count trended down to 2.52, .  Will order Granix 300 mcg today, and arrange 2 additional doses for 5/29 and 5/30 preferably at [facility] per patient's request.      Outpatient follow up plan: Outpatient appointment rescheduled for 6/3/2025.    Communication with patient/family:  I did update the patient.  Also discussed with patient's wife at bedside.    Communication with team:  Did communicate with  primary team.    I did review this patient with Dr. Roberson and they are in agreement with this plan.      Subjective:  Patient seen at bedside, no active complaints to offer.  Discussed DC plan with patient's wife at bedside, he has appointment with Dr. Pahn on 6/3/2025.      Review of Systems   Constitutional:  Negative for appetite change, fatigue and unexpected weight change.   Respiratory:  Negative for chest tightness and shortness of breath.    Cardiovascular:  Negative for chest pain and palpitations.   Gastrointestinal:  Negative for constipation and vomiting.   Genitourinary:  Negative for enuresis and frequency.   Musculoskeletal:  Negative for arthralgias and joint swelling.   Skin:  Negative for color change and rash.   Neurological:  Negative for dizziness and facial asymmetry.   All other systems reviewed and are negative.         Objective:     Medication Administration - last 24 hours from 05/27/2025 1301 to 05/28/2025 1301         Date/Time Order Dose Route Action Action by     05/28/2025 0839 EDT atorvastatin (LIPITOR) tablet 20 mg 20 mg Oral Given Heather Hines RN     05/28/2025 0839 EDT chlorhexidine (PERIDEX) 0.12 % oral rinse 15 mL 15  mL Mouth/Throat Given Heather Hines RN     05/27/2025 2040 EDT chlorhexidine (PERIDEX) 0.12 % oral rinse 15 mL 15 mL Mouth/Throat Given Maribell Catherine RN     05/28/2025 0541 EDT pantoprazole (PROTONIX) EC tablet 40 mg 40 mg Oral Given Maribell Catherine RN     05/28/2025 0839 EDT allopurinol (ZYLOPRIM) tablet 300 mg 300 mg Oral Given Heather Hines RN     05/28/2025 0541 EDT heparin (porcine) subcutaneous injection 5,000 Units 5,000 Units Subcutaneous Given Maribell Catherine RN     05/27/2025 2200 EDT heparin (porcine) subcutaneous injection 5,000 Units -- Subcutaneous Canceled Entry Maribell Catherine RN     05/27/2025 2047 EDT heparin (porcine) subcutaneous injection 5,000 Units 5,000 Units Subcutaneous Given Maribell Catherine RN     05/27/2025 1344 EDT heparin (porcine) subcutaneous injection 5,000 Units 5,000 Units Subcutaneous Given Heather Hines RN     05/27/2025 2040 EDT melatonin tablet 6 mg 6 mg Oral Given Maribell Catherine RN     05/28/2025 1126 EDT QUEtiapine (SEROquel) tablet 12.5 mg 12.5 mg Oral Given Heather Hines RN     05/28/2025 0839 EDT docusate sodium (COLACE) capsule 100 mg 100 mg Oral Given Heather Hines RN     05/27/2025 1803 EDT docusate sodium (COLACE) capsule 100 mg 100 mg Oral Given Heather Hines RN     05/28/2025 1126 EDT senna (SENOKOT) tablet 17.2 mg 17.2 mg Oral Given Heather Hines RN     05/28/2025 0839 EDT sitaGLIPtin (JANUVIA) tablet 50 mg 50 mg Oral Given Heather Hines RN     05/28/2025 1128 EDT insulin lispro (HumALOG/ADMELOG) 100 units/mL subcutaneous injection 1-5 Units 1 Units Subcutaneous Given Heather Hines RN     05/28/2025 0658 EDT insulin lispro (HumALOG/ADMELOG) 100 units/mL subcutaneous injection 1-5 Units -- Subcutaneous Not Given Heather Hines RN     05/27/2025 1607 EDT insulin lispro (HumALOG/ADMELOG) 100 units/mL subcutaneous injection 1-5 Units -- Subcutaneous Not Given Heather Hines RN     05/27/2025 2120 EDT insulin lispro  "(HumALOG/ADMELOG) 100 units/mL subcutaneous injection 1-5 Units -- Subcutaneous Not Given Maribell Catherine RN     05/28/2025 0840 EDT cefadroxil (DURICEF) capsule 1,000 mg 1,000 mg Oral Given Heathre Hines RN     05/27/2025 2040 EDT cefadroxil (DURICEF) capsule 1,000 mg 1,000 mg Oral Given Maribell Catherine RN     05/28/2025 0840 EDT metFORMIN (GLUCOPHAGE-XR) 24 hr tablet 1,000 mg 1,000 mg Oral Given Heather Hines RN     05/27/2025 1345 EDT sodium chloride 0.9 % infusion 0 mL/hr Intravenous Stopped Heather Hines RN     05/27/2025 2200 EDT QUEtiapine (SEROquel) tablet 12.5 mg -- Oral Canceled Entry Maribell Catherine RN     05/27/2025 2047 EDT QUEtiapine (SEROquel) tablet 12.5 mg 12.5 mg Oral Given Maribell Catherine RN            /82 (BP Location: Right arm)   Pulse 78   Temp 97.9 °F (36.6 °C) (Oral)   Resp 20   Ht 5' 10\" (1.778 m)   Wt 75 kg (165 lb 6.4 oz)   SpO2 96%   BMI 23.73 kg/m²       Physical Exam  Constitutional:       Appearance: Normal appearance.   HENT:      Head: Normocephalic and atraumatic.     Cardiovascular:      Rate and Rhythm: Normal rate and regular rhythm.      Pulses: Normal pulses.      Heart sounds: Normal heart sounds.   Pulmonary:      Effort: Pulmonary effort is normal.      Breath sounds: Normal breath sounds.   Abdominal:      General: Abdomen is flat.      Palpations: Abdomen is soft.      Tenderness: There is no abdominal tenderness.     Skin:     General: Skin is warm and dry.     Neurological:      Mental Status: He is alert.       Recent Results (from the past 48 hours)   Fingerstick Glucose (POCT)    Collection Time: 05/26/25  3:51 PM   Result Value Ref Range    POC Glucose 97 65 - 140 mg/dl   Methotrexate level    Collection Time: 05/26/25  6:40 PM   Result Value Ref Range    Methotrexate Lvl <0.10 umol/L   Fingerstick Glucose (POCT)    Collection Time: 05/26/25  9:03 PM   Result Value Ref Range    POC Glucose 116 65 - 140 mg/dl   CBC and Platelet    " Collection Time: 05/27/25  5:08 AM   Result Value Ref Range    WBC 3.21 (L) 4.31 - 10.16 Thousand/uL    RBC 2.24 (L) 3.88 - 5.62 Million/uL    Hemoglobin 6.8 (L) 12.0 - 17.0 g/dL    Hematocrit 21.2 (L) 36.5 - 49.3 %    MCV 95 82 - 98 fL    MCH 30.4 26.8 - 34.3 pg    MCHC 32.1 31.4 - 37.4 g/dL    RDW 14.7 11.6 - 15.1 %    Platelets 153 149 - 390 Thousands/uL    MPV 10.4 8.9 - 12.7 fL   Comprehensive metabolic panel    Collection Time: 05/27/25  5:08 AM   Result Value Ref Range    Sodium 139 135 - 147 mmol/L    Potassium 3.9 3.5 - 5.3 mmol/L    Chloride 104 96 - 108 mmol/L    CO2 32 21 - 32 mmol/L    ANION GAP 3 (L) 4 - 13 mmol/L    BUN 22 5 - 25 mg/dL    Creatinine 1.05 0.60 - 1.30 mg/dL    Glucose 100 65 - 140 mg/dL    Calcium 8.6 8.4 - 10.2 mg/dL    Corrected Calcium 9.2 8.3 - 10.1 mg/dL    AST 23 13 - 39 U/L     (H) 7 - 52 U/L    Alkaline Phosphatase 58 34 - 104 U/L    Total Protein 5.2 (L) 6.4 - 8.4 g/dL    Albumin 3.2 (L) 3.5 - 5.0 g/dL    Total Bilirubin 0.71 0.20 - 1.00 mg/dL    eGFR 74 ml/min/1.73sq m   Fingerstick Glucose (POCT)    Collection Time: 05/27/25  5:37 AM   Result Value Ref Range    POC Glucose 103 65 - 140 mg/dl   Hemoglobin and hematocrit, blood    Collection Time: 05/27/25 10:23 AM   Result Value Ref Range    Hemoglobin 7.6 (L) 12.0 - 17.0 g/dL    Hematocrit 23.8 (L) 36.5 - 49.3 %   Type and screen    Collection Time: 05/27/25 10:23 AM   Result Value Ref Range    ABO Grouping O     Rh Factor Positive     Antibody Screen Negative     Specimen Expiration Date 20250530    Fingerstick Glucose (POCT)    Collection Time: 05/27/25 10:34 AM   Result Value Ref Range    POC Glucose 149 (H) 65 - 140 mg/dl   Fingerstick Glucose (POCT)    Collection Time: 05/27/25  4:02 PM   Result Value Ref Range    POC Glucose 130 65 - 140 mg/dl   Fingerstick Glucose (POCT)    Collection Time: 05/27/25  8:47 PM   Result Value Ref Range    POC Glucose 138 65 - 140 mg/dl   CBC and differential    Collection Time:  05/28/25  5:49 AM   Result Value Ref Range    WBC 2.52 (L) 4.31 - 10.16 Thousand/uL    RBC 2.82 (L) 3.88 - 5.62 Million/uL    Hemoglobin 8.6 (L) 12.0 - 17.0 g/dL    Hematocrit 25.0 (L) 36.5 - 49.3 %    MCV 89 82 - 98 fL    MCH 30.5 26.8 - 34.3 pg    MCHC 34.4 31.4 - 37.4 g/dL    RDW 15.4 (H) 11.6 - 15.1 %    MPV 9.8 8.9 - 12.7 fL    Platelets 140 (L) 149 - 390 Thousands/uL    nRBC 1 /100 WBCs    Segmented % 25 (L) 43 - 75 %    Immature Grans % 2 0 - 2 %    Lymphocytes % 67 (H) 14 - 44 %    Monocytes % 4 4 - 12 %    Eosinophils Relative 2 0 - 6 %    Basophils Relative 0 0 - 1 %    Absolute Neutrophils 0.62 (L) 1.85 - 7.62 Thousands/µL    Absolute Immature Grans 0.05 0.00 - 0.20 Thousand/uL    Absolute Lymphocytes 1.70 0.60 - 4.47 Thousands/µL    Absolute Monocytes 0.10 (L) 0.17 - 1.22 Thousand/µL    Eosinophils Absolute 0.04 0.00 - 0.61 Thousand/µL    Basophils Absolute 0.01 0.00 - 0.10 Thousands/µL   Prepare Leukoreduced RBC: 1 Units, Leukoreduced, Irradiated    Collection Time: 05/28/25  5:55 AM   Result Value Ref Range    Unit Product Code [id number]     Unit Number [id number]     Unit ABO O     Unit RH POS     Crossmatch Compatible     Unit Dispense Status Presumed Trans     Unit Product Volume 350 ml   Fingerstick Glucose (POCT)    Collection Time: 05/28/25  6:08 AM   Result Value Ref Range    POC Glucose 96 65 - 140 mg/dl   Fingerstick Glucose (POCT)    Collection Time: 05/28/25 10:33 AM   Result Value Ref Range    POC Glucose 201 (H) 65 - 140 mg/dl       CT abdomen pelvis w contrast  Result Date: 5/19/2025  Narrative: CT ABDOMEN AND PELVIS WITH IV CONTRAST INDICATION: recurrent bacteremia, look for source. Blood culture and urine culture positive for gram-negative rods. COMPARISON: April 30, 2025. TECHNIQUE: CT examination of the abdomen and pelvis was performed. Multiplanar 2D reformatted images were created from the source data. This examination, like all CT scans performed in the [facility], was performed utilizing techniques to minimize radiation dose exposure, including the use of iterative reconstruction and automated exposure control. Radiation dose length product (DLP) for this visit: 390.85 mGy-cm. IV Contrast: 85 mL of iohexol (OMNIPAQUE) Enteric Contrast: Not administered. FINDINGS: ABDOMEN LOWER CHEST: A small calcified granuloma is redemonstrated anteriorly at the right lung base. There is coronary artery disease. A catheter terminates in the right atrium. LIVER/BILIARY TREE: There is a 13 x 9 mm cyst posteriorly in the right lobe of the liver, similar to the prior study. GALLBLADDER: No calcified gallstones. No pericholecystic inflammatory change. SPLEEN: Unremarkable. PANCREAS: Unremarkable. ADRENAL GLANDS: Unremarkable. KIDNEYS/URETERS: Mild bilateral nonspecific perinephric stranding, increased somewhat compared with April 30, 2025. No hydronephrosis bilaterally. STOMACH AND BOWEL: No bowel obstruction. No pericolonic inflammatory change. APPENDIX: Normal. ABDOMINOPELVIC CAVITY: There is a small amount of free fluid, best demonstrated in the right lower quadrant and paracolic gutters. No pneumoperitoneum. VESSELS: Atherosclerosis. No abdominal aortic aneurysm. PELVIS REPRODUCTIVE ORGANS: Unremarkable for patient's age. URINARY BLADDER: Unremarkable. ABDOMINAL WALL/INGUINAL REGIONS: There is some gas within the subcutaneous fat of the anterior abdominal walls near the level of the umbilicus, left more so than right, possibly related to recent injections. Small fat-containing left inguinal hernia. BONES: No acute fracture or suspicious osseous lesion. Spinal degenerative changes, most notably at L5-S1.     Impression: There is mild bilateral nonspecific perinephric stranding, however, this does appear somewhat increased compared with April 30, 2025. Correlation with urinalysis and urine culture and sensitivity is recommended. There is a small amount of free fluid. Other nonemergent  findings as above. Workstation performed: [id number]     CT abdomen pelvis wo contrast  Result Date: 4/30/2025  Narrative: CT ABDOMEN AND PELVIS WITHOUT IV CONTRAST INDICATION: Gram-negative bacteremia, evaluate for source infection. COMPARISON: None. TECHNIQUE: CT examination of the abdomen and pelvis was performed without intravenous contrast. Multiplanar 2D reformatted images were created from the source data. This examination, like all CT scans performed in the [facility], was performed utilizing techniques to minimize radiation dose exposure, including the use of iterative reconstruction and automated exposure control. Radiation dose length product (DLP) for this visit: 560.94 mGy-cm. Enteric Contrast: Not administered. FINDINGS: ABDOMEN LOWER CHEST: Calcified granuloma in the right middle lobe. LIVER/BILIARY TREE: 1 cm cyst in the right lobe. Liver is otherwise unremarkable.1 GALLBLADDER: No calcified gallstones. No pericholecystic inflammatory change. SPLEEN: Unremarkable. PANCREAS: Unremarkable. ADRENAL GLANDS: Unremarkable. KIDNEYS/URETERS: Unremarkable. No hydronephrosis. STOMACH AND BOWEL: Large amount of formed stool throughout the entire colon. No evidence of obstruction. APPENDIX: Normal. ABDOMINOPELVIC CAVITY: No ascites. No pneumoperitoneum. No lymphadenopathy. VESSELS: Unremarkable for patient's age. PELVIS REPRODUCTIVE ORGANS: Unremarkable for patient's age. URINARY BLADDER: Unremarkable. ABDOMINAL WALL/INGUINAL REGIONS: Mild infiltration of the anterior abdominal wall in the left lower quadrant, possibly a soft tissue contusion. BONES: No acute fracture or suspicious osseous lesion.     Impression: No acute inflammatory process in the abdomen or pelvis. Large amount of stool throughout the colon. Workstation performed: [id number]     XR abdomen obstruction series  Result Date: 4/30/2025  Narrative: XR ABDOMEN OBSTRUCTION SERIES INDICATION: Constipation, lethargy, gram-negative  bacteremia. Confusion, newly diagnosed intracranial mass. Constipation. COMPARISON: MRI abdomen 3/30/2025 and CT chest abdomen pelvis 3/29/2025 FINDINGS: Large volume of colonic stool. Otherwise unremarkable bowel gas pattern. No pneumoperitoneum or abnormal air-fluid levels. No pathologic calcification or soft tissue mass. Bones are unremarkable for patient's age. Examination of the chest reveals clear lungs and a normal cardiomediastinal silhouette. Right PICC with tip projecting over the superior cavoatrial junction.     Impression: 1.  Large colonic stool burden without obstruction or other acute abdominal findings. 2.  No acute cardiopulmonary findings. Resident: CINTHYA FAJARDO I, the attending radiologist, have reviewed the images and agree with the final report above. Workstation performed: [id number]       I have personally reviewed labs, imaging studies, and pertinent reports.      This note has been generated by voice recognition software system.  Therefore, there may be spelling, grammar, and or syntax errors. Please contact if questions arise.

## 2025-05-29 ENCOUNTER — HOSPITAL ENCOUNTER (OUTPATIENT)
Dept: INFUSION CENTER | Facility: HOSPITAL | Age: 65
Discharge: HOME | End: 2025-05-29
Attending: INTERNAL MEDICINE
Payer: COMMERCIAL

## 2025-05-29 ENCOUNTER — HOME CARE VISIT (OUTPATIENT)
Dept: HOME HEALTH SERVICES | Facility: HOME HEALTHCARE | Age: 65
End: 2025-05-29
Attending: STUDENT IN AN ORGANIZED HEALTH CARE EDUCATION/TRAINING PROGRAM
Payer: COMMERCIAL

## 2025-05-29 VITALS — TEMPERATURE: 97.7 F

## 2025-05-29 VITALS — SYSTOLIC BLOOD PRESSURE: 112 MMHG | HEART RATE: 84 BPM | DIASTOLIC BLOOD PRESSURE: 64 MMHG | OXYGEN SATURATION: 98 %

## 2025-05-29 DIAGNOSIS — D70.1 CHEMOTHERAPY INDUCED NEUTROPENIA (HCC): Primary | ICD-10-CM

## 2025-05-29 DIAGNOSIS — E11.65 TYPE 2 DIABETES MELLITUS WITH HYPERGLYCEMIA, WITHOUT LONG-TERM CURRENT USE OF INSULIN (HCC): ICD-10-CM

## 2025-05-29 DIAGNOSIS — T45.1X5A CHEMOTHERAPY INDUCED NEUTROPENIA (HCC): Primary | ICD-10-CM

## 2025-05-29 PROCEDURE — 96372 THER/PROPH/DIAG INJ SC/IM: CPT

## 2025-05-29 PROCEDURE — G0151 HHCP-SERV OF PT,EA 15 MIN: HCPCS

## 2025-05-29 PROCEDURE — 400013 VN SOC

## 2025-05-29 PROCEDURE — 10330081 VN NO-PAY CLAIM PROCEDURE

## 2025-05-29 RX ORDER — SITAGLIPTIN AND METFORMIN HYDROCHLORIDE 1000; 50 MG/1; MG/1
1 TABLET, FILM COATED, EXTENDED RELEASE ORAL DAILY
Qty: 90 TABLET | Refills: 1 | Status: SHIPPED | OUTPATIENT
Start: 2025-05-29

## 2025-05-29 RX ADMIN — TBO-FILGRASTIM 300 MCG: 300 INJECTION, SOLUTION SUBCUTANEOUS at 08:58

## 2025-05-29 NOTE — PROGRESS NOTES
..Alexis Dennison  tolerated treatment well with no complications.      Alexis Dennison is aware of future appt on 5/30 at 12:00.     AVS printed and given to Alexis Dennison:  No (Declined by Alexis Dennison)

## 2025-05-29 NOTE — CASE MANAGEMENT
Team dc summary - pt was finally medically stable from an oncology standpoint to dc to home. Pt was continuing with pt ot and speech services through [facility]. No dme needs identified. Wife present for therapy training and education. Wife present for dc instructions, daughter was on via phone.  Wife provided transport home.

## 2025-05-30 ENCOUNTER — HOME CARE VISIT (OUTPATIENT)
Dept: HOME HEALTH SERVICES | Facility: HOME HEALTHCARE | Age: 65
End: 2025-05-30
Payer: COMMERCIAL

## 2025-05-30 ENCOUNTER — HOSPITAL ENCOUNTER (OUTPATIENT)
Dept: INFUSION CENTER | Facility: HOSPITAL | Age: 65
Discharge: HOME | End: 2025-05-30
Attending: INTERNAL MEDICINE
Payer: COMMERCIAL

## 2025-05-30 VITALS
TEMPERATURE: 97.8 F | OXYGEN SATURATION: 98 % | DIASTOLIC BLOOD PRESSURE: 78 MMHG | SYSTOLIC BLOOD PRESSURE: 123 MMHG | HEART RATE: 96 BPM | RESPIRATION RATE: 18 BRPM

## 2025-05-30 DIAGNOSIS — D70.1 CHEMOTHERAPY INDUCED NEUTROPENIA (HCC): Primary | ICD-10-CM

## 2025-05-30 DIAGNOSIS — T45.1X5A CHEMOTHERAPY INDUCED NEUTROPENIA (HCC): Primary | ICD-10-CM

## 2025-05-30 PROCEDURE — 96372 THER/PROPH/DIAG INJ SC/IM: CPT

## 2025-05-30 PROCEDURE — G0321 AUDIO-ONLY HHS: HCPCS

## 2025-05-30 RX ADMIN — TBO-FILGRASTIM 300 MCG: 300 INJECTION, SOLUTION SUBCUTANEOUS at 12:08

## 2025-05-30 NOTE — PROGRESS NOTES
Alexis Dennison  tolerated treatment well with no complications.      Alexis Dennison is aware of future follow up with Dr. Phan and inpatient chemotherapy.    AVS printed and given to Alexis Dennison:  No (Declined by Alexis Dennison)

## 2025-06-01 ENCOUNTER — HOME CARE VISIT (OUTPATIENT)
Dept: HOME HEALTH SERVICES | Facility: HOME HEALTHCARE | Age: 65
End: 2025-06-01
Payer: COMMERCIAL

## 2025-06-02 PROCEDURE — G0180 MD CERTIFICATION HHA PATIENT: HCPCS | Performed by: STUDENT IN AN ORGANIZED HEALTH CARE EDUCATION/TRAINING PROGRAM

## 2025-06-02 NOTE — PROGRESS NOTES
Name: Alexis Dennison      : 1960      MRN: [id number]  Encounter Provider: Leta Phan MD  Encounter Date: 6/3/2025   Encounter department: [facility] HEMATOLOGY ONCOLOGY SPECIALISTS [facility]  :  Assessment & Plan  Primary central nervous system (CNS) lymphoma  64 yoM with PMHx of recently-diagnosed primary CNS (Dx 2025, double expressor (BCL2+/MYC+), DLBCL NOS, non-germinal center, Ki-67 60-70, BCL6 rearrangement), DM2 with baseline b/l LE neuropathy, NAFLD, and HTN with his treatment course of high-dose MTX + rituximab complicated by sepsis due to recurrent E. coli bacteremia, LETY, and transaminitis.    Overall, we will cancel his planned admission for  due to neutropenia and draw labs today.  We plan to reschedule to proceed with every 14 day high-dose MTX x2 additional infusions followed by restaging with PET CT and MRI Brain.  We recommend starting temozolomide as soon as patient obtains the medication which has received insurance authorization.  We will discontinue further rituximab.  We will consider referral to [place] for evaluation for bone marrow transplant.    Treatment Plan  High-dose MTX every two weeks x4 cycles followed by every four weeks maintenance + rituximab weekly x8 cycles (regimen modified from: https://pubmed.ncbi.nlm.nih.gov/25830821/)    High-dose IV Methotrexate every 14 days  C1 () 8 g/m²  C2 () 3 g/m², dose-reduced due to LETY, delayed x1 day due to E. coli bacteremia   C3 () 3 g/m², delayed due to recurrent E. coli bacteremia  C4 (), 3 g/m², deferred due to neutropenia    Rituximab  C1 () 375 mg/m²  C2 ()  C3 (5/3)  C4 (5/10)  C5 ()  Discontinue further cycles as completed adequate length of rituxumab    Temozolomide  Insurance has been approved, advised patient to fill and begin taking    Cancel planned hospital admission for  due to  that was down-trending on 2025.  We will reschedule this admission based  on the results of his labs.    CBC and CMP today    Re-staging with PET CT and MRI Brain in 4-6 weeks after two additional MTX infusions    Will consider referral to [place] after restaging to evaluate for transplant    Referrals to ophthalmology, neurology, and oncology genetics    Next office visit to be determined after his next hospitalization / high-dose MTX treatment    Orders:    MRI Brain BT w wo Contrast    CBC and differential; Future    Comprehensive metabolic panel; Future    NM PET CT skull base to mid thigh    Ambulatory Referral to Ophthalmology; Future    Ambulatory Referral to Genetics; Future    Ambulatory referral to Neurology; Future    Pulmonary nodule  CTA CAP (3/29/2025) showed nonspecific 4 mm RLL pulm nodule, recommendation for 3 month follow up       Hepatic cyst  CTA CAP (3/29/2025) showed nonspecific 12mm hypodense right hepatic lesion, recommended MRI abdomen  MRI (3/30/2025) showed no suspicious hepatic lesions, but demonstrated simple right hepatic lobe cyst         Return for Office Visit, Labs - See Treatment Plan, Imaging - See orders, Infusion - See Treatment Plan.    History of Present Illness   Chief Complaint   Patient presents with    Follow-up     Alexis Dennison is a 64 y.o. male presents for initial ambulatory referral on 6/3/2025 with PMHx of recently-diagnosed primary CNS (Dx 4/14/2025, double expressor (BCL2+/MYC+), DLBCL NOS, non-germinal center, Ki-67 60-70, BCL6 rearrangement), DM2 with baseline b/l LE neuropathy, NAFLD, and HTN who originally presented to [facility] 3/24/2025 with progressive encephalopathy x3 weeks.  MRI Brain found multiple scattered nodular cerebral and cerebellar enhancements.  He underwent two non-diagnostic LPs prompting stereotactic brain biopsy (4/14/2025) which showed large B-cell lymphoma.  He was started on high-dose every 2 weeks MTX + weekly rituximab with gradual clinical improvement including discharge to [facility] and then discharged home  5/28/2025.  His treatment course thus far has been complicated by sepsis due to recurrent E. coli bacteremia treated with ceftriaxone and cefazolin, LETY, and transaminitis.    List of Significant Events:  MRI Brain (3/26/2025)  IMPRESSION:  Multiple scattered foci of subependymal nodular enhancement throughout ventricles (left worse than right) with mild hydrocephalus (left worse than right), scattered nodular areas of enhancement in left anterior inferior basal ganglia involving left   anterior commissure, and smaller foci of nodular enhancement along anteromedial aspect of left cerebral peduncle and left quirino. Differential includes lymphoma, metastasis, multicentric high-grade glioma with subependymal spread of tumor, among other   differentials. Recommend neurosurgical consultation for further evaluation. Consider correlation with CSF analysis.    CTA CAP w/wo (3/29/2025)  IMPRESSION:  1.  There is a nonspecific 12 mm hypodense lesion within the right hepatic lobe. While this may represent a hemangioma, a metastatic lesion can not be excluded. This is suboptimally evaluated on the current exam, due to arterial timing of the contrast   bolus and background diffuse hepatic steatosis. Further evaluation by gadolinium enhanced MRI of the abdomen is recommended.  2.  Nonspecific 4 mm right lower lobe pulmonary nodule. In the setting of a known malignancy, a 3-month follow-up chest CT could be performed for further evaluation.  3.  Heterogeneous nodular enlargement of the left thyroid lobe. A nonemergent thyroid ultrasound follow-up is recommended.  4.  Please refer to the report body for description of other incidental, chronic and/or benign findings.    MRI Abdomen w/wo (3/30/2025)  MPRESSION:  1.  No suspicious hepatic lesions. There is a simple right hepatic lobe cyst.  2.  Mild diffuse hepatic steatosis.    Brain Biopsy (4/14/2025)  A-B. BRAIN, LEFT VENTRICULAR MASS, BIOPSY: DIFFUSE LARGE B-CELL LYMPHOMA, NOT  OTHERWISE SPECIFIED (NOS); ACTIVATED B-CELL / NON-GERMINAL CENTER SUBTYPE AND DOUBLE-EXPRESSOR PHENOTYPE; SEE IMPRESSION AND COMMENT    IMPRESSION:  The findings are brain parenchyma involved by a large B-cell infiltrate. Ancillary testing reportedly detects a BCL6 rearrangement, MYC deletion and gains of other chromosomes, without MYC or BCL2 rearrangements. Molecular NGS testing reportedly detects a tier 1 MYD88 L265P (VAF 76.1%) mutation and tier 2 TP53 (VAF 86.8%), PRDM1 (VAF 85.5%) and KMT2D (VAF 42.0%%) mutations. The overall findings are consistent with involvement by diffuse large B-cell lymphoma (DLBCL), NOS, activated B-cell / non-germinal center subtype, per the Chirag criteria, and double-expressor phenotype by immunohistochemistry.     Subtype: Activated B-cell / Non-Germinal Center Subtype: (CD10-/BCL6+/MUM1+)  Phenotype: Double-Expressor Phenotype (BCL2+/MYC+)  Ki-67 Proliferation Index: 60-70% within neoplastic cells  Cytogenetic FISH Studies: BCL6 rearrangement    MRI C/T/L Spine (4/20/2025)  IMPRESSION:  1.  No evidence of lymphomatous involvement of the spine.  2.  Multilevel spondylotic changes, as detailed above. See narrative above for full details.  3.  Multiple large left thyroid nodules, for which further evaluation with thyroid sonogram is recommended if not previously performed.    MRI Brain BT w/wo (4/20/2025)  IMPRESSION:  Within the confines of a motion-degraded examination:  1.  Decreased enhancement and perfusion metrics associated with the left gangliocapsular mass (biopsy-proven lymphoma), noting decreased mass effect on the lateral ventricles and decreased transependymal flow of CSF. The change from MRI dated 4/11/2025   could be related to medical therapy. See narrative above for full discussion.  2.  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  3.  There is a 6 mm T2 hyperintense posterior pituitary lesion, which could reflect a Rathke's cleft cyst/other  cystic pituitary lesion. This could be further evaluated on nonemergent MRI pituitary protocol.      Interval History: 6/3/2025    Patient was discharged from rehab 5/28.  He has had balance problems most pronounce immediately after standing and lasting 1-2 minutes.  He has had one fall without head strike or LOC.  He has baseline diabetic neuropathy with onset prior to treatment.  He has been performing his own ADLs including showering.  Labs (5/28/2025) show WBC 2.5, , Hb 8.6, Plt 140, and most recent BMP from 5/27/2025 roughly wnl with SCr 1.05.  Recommended follow up with surgery / PCP for removal of his staples.      Oncology History   Cancer Staging   No matching staging information was found for the patient.  Oncology History   Primary CNS lymphoma   4/16/2025 Initial Diagnosis    Primary CNS lymphoma     4/17/2025 -  Chemotherapy    leucovorin (WELLCOVORIN), 25 mg, 3 of 8 cycles  Administration: 25 mg (4/19/2025), 25 mg (4/19/2025), 25 mg (4/20/2025), 25 mg (4/20/2025), 25 mg (4/20/2025), 25 mg (4/21/2025)  leucovorin calcium IVPB, 25 mg, 3 of 8 cycles  Administration: 25 mg (5/3/2025), 25 mg (5/3/2025), 25 mg (5/4/2025), 25 mg (5/4/2025), 25 mg (5/4/2025), 25 mg (5/4/2025), 25 mg (5/22/2025), 25 mg (5/23/2025)  methotrexate (PF) IVPB, 16,320 mg, 3 of 8 cycles  Dose modification: 8,000 mg/m2 (original dose 3,500 mg/m2, Cycle 2, Reason: Other (Must fill in a comment), Comment: Patient with aggressive PCNSL), 3,000 mg/m2 (original dose 3,500 mg/m2, Cycle 2, Reason: Other (Must fill in a comment), Comment: Elevated SCr)  Administration: 6,000 mg (5/2/2025), 6,000 mg (5/21/2025), 16,000 mg (4/18/2025)  riTUXimab (RITUXAN) first titrated chemo infusion, 765 mg (100 % of original dose 375 mg/m2), 3 of 8 cycles  Dose modification: 375 mg/m2 (original dose 375 mg/m2, Cycle 1)  Administration: 800 mg (4/17/2025), 700 mg (4/24/2025), 800 mg (5/3/2025), 800 mg (5/10/2025)  riTUXimab-ABBS (TRUXIMA) first titrated  "infusion, 375 mg/m2 = 765 mg, 1 of 1 cycle        Review of Systems  ROS was performed and is negative except as indicated in HPI      Medical History Reviewed by provider this encounter:     .  Past Medical History   Past Medical History[1]  Past Surgical History[2]  Family History[3]   reports that he has never smoked. He has never been exposed to tobacco smoke. He has never used smokeless tobacco. He reports current alcohol use of about 1.0 standard drink of alcohol per week. He reports that he does not use drugs.  Current Outpatient Medications   Medication Instructions    acetaminophen (TYLENOL) 650 mg, Oral, Every 6 hours PRN    allopurinol (ZYLOPRIM) 300 mg, Oral, Daily    atorvastatin (LIPITOR) 20 mg, Daily    docusate sodium (COLACE) 100 mg, Oral, 2 times daily    melatonin 6 mg, Oral, Daily at bedtime    omeprazole (PRILOSEC) 20 mg, Oral, Daily    QUEtiapine (SEROQUEL) 12.5 mg, Oral, 2 times daily    senna (SENOKOT) 17.2 mg, Oral, Daily with lunch    SITaglip Phos-metFORMIN HCl ER (Janumet XR)  MG TB24 1 tablet, Oral, Daily   Allergies[4]   Medications Ordered Prior to Encounter[5]   Social History[6]      Objective   /78 (BP Location: Left arm, Patient Position: Sitting, Cuff Size: Standard)   Pulse 99   Temp 97.7 °F (36.5 °C) (Temporal)   Resp 18   Ht 5' 10\" (1.778 m)   Wt 77.1 kg (170 lb)   SpO2 99%   BMI 24.39 kg/m²     Pain Screening:  Pain Score: 0-No pain    ECOG ECOG Performance Status: 2 - Ambulatory and capable of all selfcare but unable to carry out any work activities.  Up and about more than 50% of waking hours     Physical Exam  General: AAOx4, well-appearing, improved mentation, no acute distress  HEENT: well-healing surgical scars, no ventricular drain, PERRLA, moist mucosa  Respiratory: CTAB w/o wheezes, rales, or rhonchi, no increased work of breathing  Cardiovascular: regular rhythm, bradycardia, w/o murmurs, 2+ radial and pedal pulses, no b/l LE edema  Abdomen: soft, " non-tender, non-distended, no hepatomegaly or splenomegaly  /Rectal: deferred  Musculoskeletal: moves all four extremities with normal strength and ROM  Integumentary: warm, dry, no rash or bruising  Neurological: improved coordination  Psychiatric: pleasant and cooperative with normal mood, affect, and cognition     Labs: I have reviewed the following labs:  Lab Results   Component Value Date/Time    WBC 6.48 06/03/2025 10:25 AM    RBC 3.01 (L) 06/03/2025 10:25 AM    Hemoglobin 9.1 (L) 06/03/2025 10:25 AM    Hematocrit 28.9 (L) 06/03/2025 10:25 AM    MCV 96 06/03/2025 10:25 AM    MCH 30.2 06/03/2025 10:25 AM    RDW 17.9 (H) 06/03/2025 10:25 AM    Platelets 203 06/03/2025 10:25 AM    Segmented % 25 (L) 05/28/2025 05:49 AM    Lymphocytes % 67 (H) 05/28/2025 05:49 AM    Monocytes % 4 05/28/2025 05:49 AM    Eosinophils Relative 2 05/28/2025 05:49 AM    Basophils Relative 0 05/28/2025 05:49 AM    Immature Grans % 2 05/28/2025 05:49 AM    Absolute Neutrophils 0.62 (L) 05/28/2025 05:49 AM     Lab Results   Component Value Date/Time    Potassium 3.9 05/27/2025 05:08 AM    Chloride 104 05/27/2025 05:08 AM    CO2 32 05/27/2025 05:08 AM    BUN 22 05/27/2025 05:08 AM    Creatinine 1.05 05/27/2025 05:08 AM    Glucose, Fasting 125 (H) 05/26/2025 04:55 AM    Calcium 8.6 05/27/2025 05:08 AM    Corrected Calcium 9.2 05/27/2025 05:08 AM    AST 23 05/27/2025 05:08 AM     (H) 05/27/2025 05:08 AM    Alkaline Phosphatase 58 05/27/2025 05:08 AM    Total Protein 5.2 (L) 05/27/2025 05:08 AM    Albumin 3.2 (L) 05/27/2025 05:08 AM    Total Bilirubin 0.71 05/27/2025 05:08 AM    eGFR 74 05/27/2025 05:08 AM     Lab Results   Component Value Date/Time    WBC 6.48 06/03/2025 10:25 AM    RBC 3.01 (L) 06/03/2025 10:25 AM    Hemoglobin 9.1 (L) 06/03/2025 10:25 AM    Hematocrit 28.9 (L) 06/03/2025 10:25 AM    MCV 96 06/03/2025 10:25 AM    MCH 30.2 06/03/2025 10:25 AM    RDW 17.9 (H) 06/03/2025 10:25 AM    Platelets 203 06/03/2025 10:25 AM     Segmented % 25 (L) 05/28/2025 05:49 AM    Bands % 10 (H) 05/22/2025 06:13 AM    Lymphocytes % 67 (H) 05/28/2025 05:49 AM    Monocytes % 4 05/28/2025 05:49 AM    Eosinophils Relative 2 05/28/2025 05:49 AM    Basophils Relative 0 05/28/2025 05:49 AM    Immature Grans % 2 05/28/2025 05:49 AM    Absolute Neutrophils 0.62 (L) 05/28/2025 05:49 AM      Lab Results   Component Value Date/Time    Sodium 139 05/27/2025 05:08 AM    Potassium 3.9 05/27/2025 05:08 AM    Chloride 104 05/27/2025 05:08 AM    CO2 32 05/27/2025 05:08 AM    ANION GAP 3 (L) 05/27/2025 05:08 AM    BUN 22 05/27/2025 05:08 AM    Creatinine 1.05 05/27/2025 05:08 AM    Glucose 100 05/27/2025 05:08 AM    Glucose, Fasting 125 (H) 05/26/2025 04:55 AM    Calcium 8.6 05/27/2025 05:08 AM    Corrected Calcium 9.2 05/27/2025 05:08 AM    AST 23 05/27/2025 05:08 AM     (H) 05/27/2025 05:08 AM    Alkaline Phosphatase 58 05/27/2025 05:08 AM    Total Protein 5.2 (L) 05/27/2025 05:08 AM    Albumin 3.2 (L) 05/27/2025 05:08 AM    Total Bilirubin 0.71 05/27/2025 05:08 AM    eGFR 74 05/27/2025 05:08 AM      Lab Results   Component Value Date/Time    TSH 3RD GENERATON 1.953 05/21/2025 08:34 AM    Free T4 0.85 05/12/2025 06:14 AM     04/25/2025 05:01 AM      Pathology (Stereotactic Brain Biopsy: 4/14/2025)      Component  Ref Range & Units (hover)    Case Report   Surgical Pathology Report                         Case: [id number]                                   Authorizing Provider:  Paul Causey MD            Collected:           04/14/2025 1258               Ordering Location:     [facility]      Received:            04/14/2025 1318                                      Hospital Operating Room                                                       Pathologist:           Jonathan Morales MD                                                                            Intraop:               Valentin Cruz MD                                                             Specimens:   A) - Brain, Left Ventricular Mass                                                                    B) - Brain, Left Ventricular Mass                                                          Final Diagnosis   A-B. BRAIN, LEFT VENTRICULAR MASS, BIOPSY: DIFFUSE LARGE B-CELL LYMPHOMA, NOT OTHERWISE SPECIFIED (NOS); ACTIVATED B-CELL / NON-GERMINAL CENTER SUBTYPE AND DOUBLE-EXPRESSOR PHENOTYPE; SEE IMPRESSION AND COMMENT     IMPRESSION:  The findings are brain parenchyma involved by a large B-cell infiltrate. Ancillary testing reportedly detects a BCL6 rearrangement, MYC deletion and gains of other chromosomes, without MYC or BCL2 rearrangements. Molecular NGS testing reportedly detects a tier 1 MYD88 L265P (VAF 76.1%) mutation and tier 2 TP53 (VAF 86.8%), PRDM1 (VAF 85.5%) and KMT2D (VAF 42.0%%) mutations. The overall findings are consistent with involvement by diffuse large B-cell lymphoma (DLBCL), NOS, activated B-cell / non-germinal center subtype, per the Chirag criteria, and double-expressor phenotype by immunohistochemistry.      Subtype: Activated B-cell / Non-Germinal Center Subtype: (CD10-/BCL6+/MUM1+)  Phenotype: Double-Expressor Phenotype (BCL2+/MYC+)  Ki-67 Proliferation Index: 60-70% within neoplastic cells  Cytogenetic FISH Studies: BCL6 rearrangement     Reviewed with agreement in intradepartmental consensus. See comment for microscopic description and ancillary testing. Results communicated to PATI Beckham and Dr. ANTHONY Ortiz by Dr. ARPIT Morales via Epic Secure Chat on 04/15/2025, at 1430.         COMMENT:  H&E stained sections show brain parenchyma involved by sheets of atypical, large lymphoid cells. By cytomorphology, the cells show variably increased amounts of cytoplasm, large, irregular nuclear contours with vesicular chromatin, and prominent nucleoli. There are frequent mitotic  figures and abundant apoptotic bodies. There are no areas of necrosis, granulomata or overt viral cytopathic changes.     By immunohistochemistry, CD20 shows that the large cells are CD20-positive B-cells, which co-express CD5, BCL6 (>30%), BCL2 (>50%), MUM-1 (>30%) and c-MYC (>40%), and are negative for CD3, CD10 (<30%), CD30, BCL1, SOX11 and EBV encoded RNA in situ hybridization (JORGE STEPHANIE). The Ki-67 proliferation index is increased overall (60-70%). CD3 highlights background small T-cells. All stains are performed with appropriate controls.     CYTOGENETIC FISH STUDIES:   Cytogenetic FISH studies are performed (High-Grade/Large B-Cell Lymphoma) and reportedly show the following; ([employer] #[id number]; see separate report):       Cytogenetic FISH studies are performed (CCND1/IgH t(11;14)) and reportedly show the following; ([employer] #[id number]; see separate report):           MOLECULAR STUDIES:   NeoTYPE® Analysis Lymphoid Disorders Profile is performed and reportedly detects the following; ([employer] #[id number]; see separate report):                         Comments:   This is an appended report. These results have been appended to a previously preliminary verified report.      Electronically signed by Jonathan Morales MD on 5/30/2025 at 1506 EDT  Preliminary result electronically signed by Jonathan Morales MD on 4/16/2025 at 0949 EDT  Preliminary result electronically signed by Jonathan Morales MD on 4/15/2025 at 1432 EDT   Note    Comment: This is an appended report. These results have been appended to a previously preliminary verified report.   Additional Information    All reported additional testing was performed with appropriately reactive controls.  These tests were developed and their performance characteristics determined by [facility] or appropriate performing facility, though some tests may be performed on tissues which have not been  "validated for performance characteristics (such as staining performed on alcohol exposed cell blocks and decalcified tissues).  Results should be interpreted with caution and in the context of the patients’ clinical condition. These tests may not be cleared or approved by the U.S. Food and Drug Administration, though the FDA has determined that such clearance or approval is not necessary. These tests are used for clinical purposes and they should not be regarded as investigational or for research. This laboratory has been approved by CLIA 88, designated as a high-complexity laboratory and is qualified to perform these tests.     Interpretation performed at [facility], [address] [place] [id number].   Comment: This is an appended report. These results have been appended to a previously preliminary verified report.   Intraoperative Consultation    AF1:  A. Brain, Left Ventricular Mass:  Small round blue cell tumor  Differential Dx includes lymphoma  Peer reviewed     Reported to Dr Causey at 13;50 on 4/14/25.             Gross Description    A.  The specimen is received fresh for frozen section, labeled with the patient's name and hospital number, and is designated \" left ventricular mass\".  The specimen consists of a single tan tissue fragment measuring 0.2 x 0.1 by less than 0.1 cm.  The specimen is entirely submitted for frozen section, 1 cassette.  B. The specimen is received in formalin, labeled with the patient's name and hospital number, and is designated \" left ventricular brain mass\".  The specimen consists of 2 tan tissue fragments measuring 0.2-0.3 cm in greatest dimension.  The specimen is drained into an embedding bag. Entirely submitted. One cassette.     Note: The estimated total formalin fixation time based upon information provided by the submitting clinician and the standard processing schedule is under 72 hours. Kary Ellison   Comment: This is an appended report. These results have been " appended to a previously preliminary verified report.        Radiology Results Review: I have reviewed radiology reports from Apr-May 2025 including: CT chest, CT abdomen/pelvis, and MRI brain.    Administrative Statements   I have spent a total time of 60 minutes in caring for this patient on the day of the visit/encounter including Diagnostic results, Prognosis, Risks and benefits of tx options, Instructions for management, Patient and family education, Importance of tx compliance, Risk factor reductions, Impressions, Counseling / Coordination of care, Documenting in the medical record, Reviewing/placing orders in the medical record (including tests, medications, and/or procedures), Obtaining or reviewing history  , and Communicating with other healthcare professionals .    Additional recommendations to follow per attending, Dr. Phan.    Bryson Garcia DO, PGY4  Hematology/Oncology Fellow       [1]   Past Medical History:  Diagnosis Date    Colon polyp     Diabetes mellitus (HCC)     GERD (gastroesophageal reflux disease)     Hyperlipidemia     Hypertension     Liver disease     Sleep apnea    [2]   Past Surgical History:  Procedure Laterality Date    COLONOSCOPY      CYST REMOVAL      back    FL LUMBAR PUNCTURE DIAGNOSTIC  3/31/2025    FL LUMBAR PUNCTURE DIAGNOSTIC  4/7/2025    MD EXC B9 LESION MRGN XCP SK TG T/A/L 0.6-1.0 CM N/A 6/7/2023    Procedure: EXCISION  BIOPSY LESION/MASS ABDOMINAL/CHEST WALL;  Surgeon: Trevor Villavicencio MD;  Location:  MAIN OR;  Service: General    THIRD VENTRICULOSTOMY Left 4/14/2025    Procedure: Left frontal endoscopic biopsy of ventricular mass and placement of EVD;  Surgeon: Paul Causey MD;  Location: [facility];  Service: Neurosurgery   [3]   Family History  Problem Relation Name Age of Onset    Cancer Mother Kellen Renee     Cancer Father Amritbrandon Dennison     Diabetes Paternal Grandfather Shailesh Juma     Diabetes unspecified Paternal Grandfather Shaileshtristian CalixJuma         Type 2    Colon  polyps Neg Hx      Colon cancer Neg Hx     [4] No Known Allergies  [5]   Current Outpatient Medications on File Prior to Visit   Medication Sig Dispense Refill    acetaminophen (TYLENOL) 325 mg tablet Take 2 tablets (650 mg total) by mouth every 6 (six) hours as needed for mild pain, moderate pain, severe pain, headaches or fever      allopurinol (ZYLOPRIM) 300 mg tablet Take 1 tablet (300 mg total) by mouth daily 30 tablet 0    atorvastatin (LIPITOR) 20 mg tablet Take 20 mg by mouth in the morning.      docusate sodium (COLACE) 100 mg capsule Take 1 capsule (100 mg total) by mouth 2 (two) times a day      omeprazole (PriLOSEC) 20 mg delayed release capsule Take 1 capsule (20 mg total) by mouth daily      QUEtiapine (SEROquel) 25 mg tablet Take 0.5 tablets (12.5 mg total) by mouth at noon and 0.5 tablets (12.5 mg total) before bedtime. 30 tablet 0    senna (SENOKOT) 8.6 mg Take 2 tablets (17.2 mg total) by mouth daily with lunch      SITaglip Phos-metFORMIN HCl ER (Janumet XR)  MG TB24 Take 1 tablet by mouth in the morning 90 tablet 1    melatonin 3 mg Take 2 tablets (6 mg total) by mouth daily at bedtime (Patient not taking: Reported on 5/29/2025)       No current facility-administered medications on file prior to visit.   [6]   Social History  Tobacco Use    Smoking status: Never     Passive exposure: Never    Smokeless tobacco: Never    Tobacco comments:     N/a   Vaping Use    Vaping status: Never Used   Substance and Sexual Activity    Alcohol use: Yes     Alcohol/week: 1.0 standard drink of alcohol     Types: 1 Cans of beer per week     Comment: socially    Drug use: Never    Sexual activity: Not Currently     Partners: Female

## 2025-06-03 ENCOUNTER — APPOINTMENT (OUTPATIENT)
Dept: LAB | Facility: HOSPITAL | Age: 65
End: 2025-06-03
Payer: COMMERCIAL

## 2025-06-03 ENCOUNTER — TELEPHONE (OUTPATIENT)
Dept: HOME HEALTH SERVICES | Facility: HOME HEALTHCARE | Age: 65
End: 2025-06-03

## 2025-06-03 ENCOUNTER — OFFICE VISIT (OUTPATIENT)
Age: 65
End: 2025-06-03
Payer: COMMERCIAL

## 2025-06-03 ENCOUNTER — HOME CARE VISIT (OUTPATIENT)
Dept: HOME HEALTH SERVICES | Facility: HOME HEALTHCARE | Age: 65
End: 2025-06-03
Payer: COMMERCIAL

## 2025-06-03 VITALS
DIASTOLIC BLOOD PRESSURE: 78 MMHG | HEART RATE: 99 BPM | OXYGEN SATURATION: 99 % | TEMPERATURE: 97.7 F | HEIGHT: 70 IN | BODY MASS INDEX: 24.34 KG/M2 | RESPIRATION RATE: 18 BRPM | SYSTOLIC BLOOD PRESSURE: 118 MMHG | WEIGHT: 170 LBS

## 2025-06-03 DIAGNOSIS — K76.89 HEPATIC CYST: ICD-10-CM

## 2025-06-03 DIAGNOSIS — C83.390 PRIMARY CENTRAL NERVOUS SYSTEM (CNS) LYMPHOMA: ICD-10-CM

## 2025-06-03 DIAGNOSIS — R91.1 PULMONARY NODULE: ICD-10-CM

## 2025-06-03 DIAGNOSIS — C83.390 PRIMARY CENTRAL NERVOUS SYSTEM (CNS) LYMPHOMA: Primary | ICD-10-CM

## 2025-06-03 LAB
ALBUMIN SERPL BCG-MCNC: 3.9 G/DL (ref 3.5–5)
ALP SERPL-CCNC: 99 U/L (ref 34–104)
ALT SERPL W P-5'-P-CCNC: 26 U/L (ref 7–52)
ANION GAP SERPL CALCULATED.3IONS-SCNC: 7 MMOL/L (ref 4–13)
ANISOCYTOSIS BLD QL SMEAR: PRESENT
AST SERPL W P-5'-P-CCNC: 13 U/L (ref 13–39)
BASOPHILS # BLD MANUAL: 0 THOUSAND/UL (ref 0–0.1)
BASOPHILS NFR MAR MANUAL: 0 % (ref 0–1)
BILIRUB SERPL-MCNC: 0.41 MG/DL (ref 0.2–1)
BUN SERPL-MCNC: 16 MG/DL (ref 5–25)
CALCIUM SERPL-MCNC: 9.3 MG/DL (ref 8.4–10.2)
CHLORIDE SERPL-SCNC: 104 MMOL/L (ref 96–108)
CO2 SERPL-SCNC: 27 MMOL/L (ref 21–32)
CREAT SERPL-MCNC: 1.14 MG/DL (ref 0.6–1.3)
EOSINOPHIL # BLD MANUAL: 0 THOUSAND/UL (ref 0–0.4)
EOSINOPHIL NFR BLD MANUAL: 0 % (ref 0–6)
ERYTHROCYTE [DISTWIDTH] IN BLOOD BY AUTOMATED COUNT: 17.9 % (ref 11.6–15.1)
GFR SERPL CREATININE-BSD FRML MDRD: 67 ML/MIN/1.73SQ M
GLUCOSE P FAST SERPL-MCNC: 162 MG/DL (ref 65–99)
HCT VFR BLD AUTO: 28.9 % (ref 36.5–49.3)
HGB BLD-MCNC: 9.1 G/DL (ref 12–17)
LYMPHOCYTES # BLD AUTO: 2.46 THOUSAND/UL (ref 0.6–4.47)
LYMPHOCYTES # BLD AUTO: 37 % (ref 14–44)
MACROCYTES BLD QL AUTO: PRESENT
MCH RBC QN AUTO: 30.2 PG (ref 26.8–34.3)
MCHC RBC AUTO-ENTMCNC: 31.5 G/DL (ref 31.4–37.4)
MCV RBC AUTO: 96 FL (ref 82–98)
METAMYELOCYTE ABSOLUTE CT: 0.13 THOUSAND/UL (ref 0–0.1)
METAMYELOCYTES NFR BLD MANUAL: 2 % (ref 0–1)
MONOCYTES # BLD AUTO: 0.58 THOUSAND/UL (ref 0–1.22)
MONOCYTES NFR BLD: 9 % (ref 4–12)
MYELOCYTE ABSOLUTE CT: 0.45 THOUSAND/UL (ref 0–0.1)
MYELOCYTES NFR BLD MANUAL: 7 % (ref 0–1)
NEUTROPHILS # BLD MANUAL: 2.85 THOUSAND/UL (ref 1.85–7.62)
NEUTS BAND NFR BLD MANUAL: 2 % (ref 0–8)
NEUTS SEG NFR BLD AUTO: 42 % (ref 43–75)
NRBC BLD AUTO-RTO: 1 /100 WBC (ref 0–2)
PLATELET # BLD AUTO: 203 THOUSANDS/UL (ref 149–390)
PLATELET BLD QL SMEAR: ADEQUATE
PMV BLD AUTO: 11.7 FL (ref 8.9–12.7)
POIKILOCYTOSIS BLD QL SMEAR: PRESENT
POLYCHROMASIA BLD QL SMEAR: PRESENT
POTASSIUM SERPL-SCNC: 4.1 MMOL/L (ref 3.5–5.3)
PROT SERPL-MCNC: 6.5 G/DL (ref 6.4–8.4)
RBC # BLD AUTO: 3.01 MILLION/UL (ref 3.88–5.62)
RBC MORPH BLD: PRESENT
SODIUM SERPL-SCNC: 138 MMOL/L (ref 135–147)
VARIANT LYMPHS # BLD AUTO: 1 %
WBC # BLD AUTO: 6.48 THOUSAND/UL (ref 4.31–10.16)

## 2025-06-03 PROCEDURE — 99215 OFFICE O/P EST HI 40 MIN: CPT | Performed by: INTERNAL MEDICINE

## 2025-06-03 PROCEDURE — 85007 BL SMEAR W/DIFF WBC COUNT: CPT

## 2025-06-03 PROCEDURE — 36415 COLL VENOUS BLD VENIPUNCTURE: CPT

## 2025-06-03 PROCEDURE — 85027 COMPLETE CBC AUTOMATED: CPT

## 2025-06-03 PROCEDURE — 80053 COMPREHEN METABOLIC PANEL: CPT

## 2025-06-03 NOTE — ASSESSMENT & PLAN NOTE
CTA CAP (3/29/2025) showed nonspecific 4 mm RLL pulm nodule, recommendation for 3 month follow up

## 2025-06-03 NOTE — ASSESSMENT & PLAN NOTE
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 coli bacteremia, LETY, and transaminitis.    Overall, we will cancel his planned admission for 6/4 due to neutropenia and draw labs today.  We plan to reschedule to proceed with every 14 day high-dose MTX x2 additional infusions followed by restaging with PET CT and MRI Brain.  We recommend starting temozolomide as soon as patient obtains the medication which has received insurance authorization.  We will discontinue further rituximab.  We will consider referral to [facility] for evaluation for bone marrow transplant.    Treatment Plan  High-dose MTX every two weeks x4 cycles followed by every four weeks maintenance + rituximab weekly x8 cycles (regimen modified from: https://pubmed.ncbi.nlm.nih.gov/49605267/)    High-dose IV Methotrexate every 14 days  C1 (4/18) 8 g/m²  C2 (5/2) 3 g/m², dose-reduced due to LETY, delayed x1 day due to E. coli bacteremia   C3 (5/21) 3 g/m², delayed due to recurrent E. coli bacteremia  C4 (6/4), 3 g/m², deferred due to neutropenia    Rituximab  C1 (4/17) 375 mg/m²  C2 (4/24)  C3 (5/3)  C4 (5/10)  C5 (5/23)  Discontinue further cycles as completed adequate length of rituxumab    Temozolomide  Insurance has been approved, advised patient to fill and begin taking    Cancel planned hospital admission for 6/4 due to  that was down-trending on 5/28/2025.  We will reschedule this admission based on the results of his labs.    CBC and CMP today    Re-staging with PET CT and MRI Brain in 4-6 weeks after two additional MTX infusions    Will consider referral to [facility] after restaging to evaluate for transplant    Referrals to ophthalmology, neurology, and oncology genetics    Next  office visit to be determined after his next hospitalization / high-dose MTX treatment    Orders:    MRI Brain BT w wo Contrast    CBC and differential; Future    Comprehensive metabolic panel; Future    NM PET CT skull base to mid thigh    Ambulatory Referral to Ophthalmology; Future    Ambulatory Referral to Genetics; Future    Ambulatory referral to Neurology; Future

## 2025-06-03 NOTE — SPEECH THERAPY NOTE
[facility] Speech Therapy Discharge Summary     Pt admitted to [facility] on 5/7/2025 dx Primary CNS lymphoma.     Pt was followed for cognitive linguistic tx sessions. Upon admission to the unit, pt was demonstrating fairly significant difficulty in providing own LT biographical information to where the Informal Cognitive Assessment was completed. Pt was demonstrating severely impaired deficits noted in the following areas: decreased attention, LT memory recall, ST memory recall , problem solving, reasoning, sequencing, organization of thoughts, judgement, slower processing, insight, and as well as likely deficits in language skills. The following interventions are used to target these barriers, including visual orientation checklist, verbal problem solving task, verbal working memory tasks, visual memory recall tasks, drawing conclusions activities, written sequencing tasks, categorization tasks , picture problem solving activities, verbal reasoning tasks, functional reading tasks, word deduction tasks and family education/training.      Current level of functioning:   Comprehension: min A  Expression: min A  Social Interaction: supervision  Executive functions: max A  Memory: max A     Family training/education has been completed with spouse Naldo. Recommendations for home are for 24hr S/A with all ADL/IADL tasks. Naldo receptive and is taking off work to accommodate. Reviewed memory strategies for home as well as activities to engage pt in which are good for overall cognition. Recommendations at time of discharge are for continued skilled ST services for cognition.      Pt was successfully discharged home on 5/28/2025 with further SLP services recommended to maximize overall skills at this time.

## 2025-06-04 ENCOUNTER — HOSPITAL ENCOUNTER (OUTPATIENT)
Dept: ULTRASOUND IMAGING | Facility: HOSPITAL | Age: 65
Discharge: HOME | End: 2025-06-04
Payer: COMMERCIAL

## 2025-06-04 ENCOUNTER — PATIENT OUTREACH (OUTPATIENT)
Dept: HEMATOLOGY ONCOLOGY | Facility: CLINIC | Age: 65
End: 2025-06-04

## 2025-06-04 ENCOUNTER — HOME CARE VISIT (OUTPATIENT)
Dept: HOME HEALTH SERVICES | Facility: HOME HEALTHCARE | Age: 65
End: 2025-06-04
Payer: COMMERCIAL

## 2025-06-04 ENCOUNTER — DOCUMENTATION (OUTPATIENT)
Age: 65
End: 2025-06-04

## 2025-06-04 ENCOUNTER — PATIENT MESSAGE (OUTPATIENT)
Age: 65
End: 2025-06-04

## 2025-06-04 VITALS — SYSTOLIC BLOOD PRESSURE: 118 MMHG | DIASTOLIC BLOOD PRESSURE: 68 MMHG

## 2025-06-04 DIAGNOSIS — C83.390 PRIMARY CENTRAL NERVOUS SYSTEM (CNS) LYMPHOMA: Primary | ICD-10-CM

## 2025-06-04 DIAGNOSIS — E04.1 THYROID NODULE: ICD-10-CM

## 2025-06-04 PROCEDURE — 76536 US EXAM OF HEAD AND NECK: CPT

## 2025-06-04 PROCEDURE — G0151 HHCP-SERV OF PT,EA 15 MIN: HCPCS

## 2025-06-04 NOTE — PROGRESS NOTES
Planned Direct Admission for Chemoimmunotherapy:  Outpatient Medical Oncology Nursing Staff:  Alexis Dennison 64 y.o. male MRN: [id number]    Please select all of the following that have been completed in anticipation of this planned direct-admission (check each applicable box):    [x]  Obtain chemoimmunotherapy consent, and/or ensure that a consent form is on-file in the '[employer] Tab.'    [x]  If patient requires central-venous access, and does not have a Port-A-Cath in place, obtain a PICC-line consent, and/or ensure that consent is on-file in the '[employer] Tab.' (Please check Line No. 4 for current and future hospitalizations, if a PICC-line will be required for future direct-admissions):  Please specify central-venous access type here (e.g. PICC-line, Port-A-Cath, or Not Applicable): PICC-line    [x]  Ensure that pre-treatment labs have been ordered, and instruct patient to complete labs at least 3-days prior to their planned admission date (Note: Patient should complete labs on a Thursday, prior to a planned Monday admission).   Please remove ordered labs in the [place] Treatment Plan to prevent unnecessary repeat labs from being obtained on admission.    [x]  Verify that all chemoimmunotherapy orders, and supportive-cares (e.g. Premedications) have been placed into the [place] Treatment Plan. Orders should correspond with the most recent Medical Oncology Progress Note. Note: If dose-attenuation is required for any reason, please make sure that the change in dosing, and reason for change is appropriately documented in the most recent progress note. If it is not documented, please contact the primary Medical Oncology provider to do so.    [x]  Verify that all appropriate ancillary-studies have been completed prior to the planned direct-admission date (e.g. Echocardiogram for cardiotoxic chemotherapy).    [x]  Ensure that the upcoming cycle is dated correctly for the planned direct-admission start-date.    [x]   "Ensure that all appropriate orders for the upcoming cycle have been converted to inpatient status (e.g. A 'hospital bed' should appear next to the upcoming cycle day(s) planned to be be completed during the upcoming direct-admission). Note: Orders that are planned to be administered in the outpatient setting (e.g. Growth-factor, such as Neulasta), should not be converted to inpatient status, and will have a clinic building next to the corresponding planned cycle day(s).     [x]  Ensure that orders for the upcoming cycle have been signed by the primary Medical Oncology provider (e.g. A 'green-checkmark' should appear on the left-side of all orders planned to be administered during the upcoming cycle).    [x]  Email the \"Inpatient Chemo Admissions\" pool ([email or web address]) to notify all involved parties regarding the planned direct-admission. Please include the following information: Name, Date of Birth, Location of Direct-Admission, Date of Planned Admission, Chemoimmunotherapy Regimen and Cycle Number, and Indication for Inpatient Treatment (e.g. Prior hypersensitivity-reaction requiring inpatient administration and monitoring).    [x]  Place an order for 'Transfer to Other Facility' (ADT-21 order). Select the following: Under request-type, select 'Transfer to [facility] Facility.' Under reason for transfer, select 'Direct-Admission'. Select 'Hospitalist Service' as the admitting service. For level of care, please specify 'Level 2 Stepdown'. Note: Please provide a contact number for the admitting provider to call to discuss the patient's case.    Please specify indication for direct-admission here: High dose MTX and rituxan   "

## 2025-06-04 NOTE — PROGRESS NOTES
I reached out to Alexis and his spouse  to complete the Distress Thermometer, review for any barriers to care and to offer any supportive services that may be needed.I explained the supportive services we offer here at [facility] at this time Alexis stated he is not interested in patient navigation. I provided him with my direct number [phone] if he changes his mind or if any barriers were to arise he can give me a call. He was very appreciative of my call.

## 2025-06-05 ENCOUNTER — HOME CARE VISIT (OUTPATIENT)
Dept: HOME HEALTH SERVICES | Facility: HOME HEALTHCARE | Age: 65
End: 2025-06-05
Payer: COMMERCIAL

## 2025-06-05 ENCOUNTER — TELEPHONE (OUTPATIENT)
Age: 65
End: 2025-06-05

## 2025-06-05 PROCEDURE — G0153 HHCP-SVS OF S/L PATH,EA 15MN: HCPCS

## 2025-06-05 RX ORDER — TEMOZOLOMIDE 20 MG/1
40 CAPSULE ORAL DAILY
Qty: 10 CAPSULE | Refills: 5 | Status: SHIPPED | OUTPATIENT
Start: 2025-06-05

## 2025-06-05 RX ORDER — TEMOZOLOMIDE 250 MG/1
250 CAPSULE ORAL DAILY
Qty: 5 CAPSULE | Refills: 5 | Status: SHIPPED | OUTPATIENT
Start: 2025-06-05

## 2025-06-05 NOTE — TELEPHONE ENCOUNTER
Patient requested direct admit for chemo be pushed out to 6/16 instead of 6/9. Called and spoke to PACS to change order. New email sent to inpatient chemo admissions team. Patient made aware will need new labs prior.

## 2025-06-06 PROBLEM — B96.20 E. COLI UTI: Status: RESOLVED | Noted: 2025-05-01 | Resolved: 2025-06-06

## 2025-06-06 PROBLEM — N39.0 E. COLI UTI: Status: RESOLVED | Noted: 2025-05-01 | Resolved: 2025-06-06

## 2025-06-06 PROBLEM — A41.9 SEPSIS (HCC): Status: RESOLVED | Noted: 2025-04-29 | Resolved: 2025-06-06

## 2025-06-08 ENCOUNTER — HOME CARE VISIT (OUTPATIENT)
Dept: HOME HEALTH SERVICES | Facility: HOME HEALTHCARE | Age: 65
End: 2025-06-08
Payer: COMMERCIAL

## 2025-06-08 NOTE — CASE COMMUNICATION
Speech therapy evaluation completed 6/5/25. Pt presents with severe cognitive-linguistic deficits. He will benefit from speech language pathology services to address cognitive and communication deficits for increased safety and independence. As pt is progressing in mobility, transition to OP therapy will likely be fast approaching.    POC: 1 wk 1; 2 wk 1 to address functional memory and other cognitive strategies in the home with goal o f increasing safety and independence with supervision, then transition to the outpatient setting for continued care.

## 2025-06-10 ENCOUNTER — TELEPHONE (OUTPATIENT)
Age: 65
End: 2025-06-10

## 2025-06-10 ENCOUNTER — HOME CARE VISIT (OUTPATIENT)
Dept: HOME HEALTH SERVICES | Facility: HOME HEALTHCARE | Age: 65
End: 2025-06-10
Payer: COMMERCIAL

## 2025-06-10 DIAGNOSIS — C83.390 PRIMARY CENTRAL NERVOUS SYSTEM (CNS) LYMPHOMA: Primary | ICD-10-CM

## 2025-06-10 PROCEDURE — G0153 HHCP-SVS OF S/L PATH,EA 15MN: HCPCS

## 2025-06-10 NOTE — TELEPHONE ENCOUNTER
Patients daughter Elizabeth called to scheduled New Patient appointment.    Patient was triaged, information sent to Movement provider intake pool for scheduling guidance.    Will follow up with Elizabeth to schedule upon receipt of scheduling recommendation from providers.

## 2025-06-11 ENCOUNTER — HOME CARE VISIT (OUTPATIENT)
Dept: HOME HEALTH SERVICES | Facility: HOME HEALTHCARE | Age: 65
End: 2025-06-11
Payer: COMMERCIAL

## 2025-06-11 VITALS — SYSTOLIC BLOOD PRESSURE: 118 MMHG | DIASTOLIC BLOOD PRESSURE: 64 MMHG

## 2025-06-11 PROCEDURE — G0151 HHCP-SERV OF PT,EA 15 MIN: HCPCS

## 2025-06-12 ENCOUNTER — HOME CARE VISIT (OUTPATIENT)
Dept: HOME HEALTH SERVICES | Facility: HOME HEALTHCARE | Age: 65
End: 2025-06-12
Payer: COMMERCIAL

## 2025-06-12 ENCOUNTER — APPOINTMENT (OUTPATIENT)
Dept: LAB | Facility: HOSPITAL | Age: 65
End: 2025-06-12
Payer: COMMERCIAL

## 2025-06-12 DIAGNOSIS — C83.390 PRIMARY CENTRAL NERVOUS SYSTEM (CNS) LYMPHOMA: Primary | ICD-10-CM

## 2025-06-12 DIAGNOSIS — N39.0 URINARY TRACT INFECTION WITHOUT HEMATURIA, SITE UNSPECIFIED: ICD-10-CM

## 2025-06-12 DIAGNOSIS — C83.390 PRIMARY CENTRAL NERVOUS SYSTEM (CNS) LYMPHOMA: ICD-10-CM

## 2025-06-12 LAB
ALBUMIN SERPL BCG-MCNC: 4.1 G/DL (ref 3.5–5)
ALP SERPL-CCNC: 72 U/L (ref 34–104)
ALT SERPL W P-5'-P-CCNC: 11 U/L (ref 7–52)
ANION GAP SERPL CALCULATED.3IONS-SCNC: 7 MMOL/L (ref 4–13)
AST SERPL W P-5'-P-CCNC: 13 U/L (ref 13–39)
BACTERIA UR QL AUTO: ABNORMAL /HPF
BASOPHILS # BLD AUTO: 0.18 THOUSANDS/ÂΜL (ref 0–0.1)
BASOPHILS NFR BLD AUTO: 2 % (ref 0–1)
BILIRUB SERPL-MCNC: 0.52 MG/DL (ref 0.2–1)
BILIRUB UR QL STRIP: NEGATIVE
BUN SERPL-MCNC: 18 MG/DL (ref 5–25)
CALCIUM SERPL-MCNC: 9.4 MG/DL (ref 8.4–10.2)
CHLORIDE SERPL-SCNC: 104 MMOL/L (ref 96–108)
CLARITY UR: ABNORMAL
CO2 SERPL-SCNC: 30 MMOL/L (ref 21–32)
COLOR UR: YELLOW
CREAT SERPL-MCNC: 1.14 MG/DL (ref 0.6–1.3)
EOSINOPHIL # BLD AUTO: 0.22 THOUSAND/ÂΜL (ref 0–0.61)
EOSINOPHIL NFR BLD AUTO: 2 % (ref 0–6)
ERYTHROCYTE [DISTWIDTH] IN BLOOD BY AUTOMATED COUNT: 18.3 % (ref 11.6–15.1)
GFR SERPL CREATININE-BSD FRML MDRD: 67 ML/MIN/1.73SQ M
GLUCOSE P FAST SERPL-MCNC: 109 MG/DL (ref 65–99)
GLUCOSE UR STRIP-MCNC: NEGATIVE MG/DL
HCT VFR BLD AUTO: 32.3 % (ref 36.5–49.3)
HGB BLD-MCNC: 10.3 G/DL (ref 12–17)
HGB UR QL STRIP.AUTO: NEGATIVE
IMM GRANULOCYTES # BLD AUTO: 0.2 THOUSAND/UL (ref 0–0.2)
IMM GRANULOCYTES NFR BLD AUTO: 2 % (ref 0–2)
KETONES UR STRIP-MCNC: NEGATIVE MG/DL
LEUKOCYTE ESTERASE UR QL STRIP: ABNORMAL
LYMPHOCYTES # BLD AUTO: 2.12 THOUSANDS/ÂΜL (ref 0.6–4.47)
LYMPHOCYTES NFR BLD AUTO: 21 % (ref 14–44)
MCH RBC QN AUTO: 30.6 PG (ref 26.8–34.3)
MCHC RBC AUTO-ENTMCNC: 31.9 G/DL (ref 31.4–37.4)
MCV RBC AUTO: 96 FL (ref 82–98)
MONOCYTES # BLD AUTO: 0.62 THOUSAND/ÂΜL (ref 0.17–1.22)
MONOCYTES NFR BLD AUTO: 6 % (ref 4–12)
NEUTROPHILS # BLD AUTO: 6.81 THOUSANDS/ÂΜL (ref 1.85–7.62)
NEUTS SEG NFR BLD AUTO: 67 % (ref 43–75)
NITRITE UR QL STRIP: POSITIVE
NON-SQ EPI CELLS URNS QL MICRO: ABNORMAL /HPF
NRBC BLD AUTO-RTO: 0 /100 WBCS
PH UR STRIP.AUTO: 6 [PH]
PLATELET # BLD AUTO: 462 THOUSANDS/UL (ref 149–390)
PMV BLD AUTO: 10.7 FL (ref 8.9–12.7)
POTASSIUM SERPL-SCNC: 4.3 MMOL/L (ref 3.5–5.3)
PROT SERPL-MCNC: 6.8 G/DL (ref 6.4–8.4)
PROT UR STRIP-MCNC: NEGATIVE MG/DL
RBC # BLD AUTO: 3.37 MILLION/UL (ref 3.88–5.62)
RBC #/AREA URNS AUTO: ABNORMAL /HPF
SODIUM SERPL-SCNC: 141 MMOL/L (ref 135–147)
SP GR UR STRIP.AUTO: 1.02 (ref 1–1.03)
UROBILINOGEN UR STRIP-ACNC: <2 MG/DL
WBC # BLD AUTO: 10.15 THOUSAND/UL (ref 4.31–10.16)
WBC #/AREA URNS AUTO: ABNORMAL /HPF

## 2025-06-12 PROCEDURE — 85025 COMPLETE CBC W/AUTO DIFF WBC: CPT

## 2025-06-12 PROCEDURE — 36415 COLL VENOUS BLD VENIPUNCTURE: CPT

## 2025-06-12 PROCEDURE — 81001 URINALYSIS AUTO W/SCOPE: CPT

## 2025-06-12 PROCEDURE — 80053 COMPREHEN METABOLIC PANEL: CPT

## 2025-06-12 PROCEDURE — G0153 HHCP-SVS OF S/L PATH,EA 15MN: HCPCS

## 2025-06-12 RX ORDER — CIPROFLOXACIN 500 MG/1
500 TABLET, FILM COATED ORAL EVERY 12 HOURS SCHEDULED
Qty: 20 TABLET | Refills: 0 | Status: ON HOLD | OUTPATIENT
Start: 2025-06-12 | End: 2025-06-22

## 2025-06-12 NOTE — OCCUPATIONAL THERAPY NOTE
OT Discharge Summary Note     Principal Problem:    Primary central nervous system (CNS) lymphoma  Active Problems:    Type 2 diabetes mellitus with hyperglycemia, without long-term current use of insulin (HCC)    HTN, goal below 130/80    Mixed hyperlipidemia    Thyroid nodule    Pulmonary nodule    Liver lesion    Ambulatory dysfunction    Encephalopathy    LETY (acute kidney injury) (HCC)    Recurrent E. coli bacteremia    Leukopenia    At risk for acid-base imbalance    Electrolyte imbalance risk    Impaired mobility and activities of daily living    HTN (hypertension)    Transaminitis    Hyponatremia    Thrombocytopenia (HCC)    Anemia    SIRS (systemic inflammatory response syndrome) (HCC)    Ankle edema, bilateral    Chemotherapy induced neutropenia (HCC)        Occupational Therapy LTG's  Eating Oral care Bathing LB dress UB dress   Eating Goal: 06. Independent - Patient completes the activity by him/herself with no assistance from a helper. Oral Hygiene Goal: 04. Supervision or touching assistance- [place] provides VERBAL CUES or supervision throughout activity.  Shower/bathe self Goal: 04. Supervision or touching assistance- [place] provides VERBAL CUES or supervision throughout activity. Lower body dressing Goal: 04. Supervision or touching assistance- [place] provides VERBAL CUES or supervision throughout activity. Upper body dressing Goal: 05. Setup or clean-up assistance - [place] SETS UP or CLEANS UP, patient completes activity. [place] assists only prior to or following the activity.   Toileting Toilet txf Func txf IADL Med    Toileting hygiene Goal: 04. Supervision or touching assistance- [place] provides VERBAL CUES or supervision throughout activity. Toilet transfer Goal: 04. Supervision or touching assistance- [place] provides VERBAL CUES or supervision throughout activity.             Pt has made fair  progress during time of stay at the [facility]. Pt is overall functioning at Supervision or touching  assistance-  for ADLs, Supervision or touching assistance-  for functional transfers and Supervision or touching assistance-  for functional mobility. Prior to DC Home, DME recommendations include bedside commode, shower chair, and RW. DME was ordered to maximize functional independence and decrease fall risk. Based on pts functional performance, pt is safe to DC Home w/ recommendations noted above. Pt would benefit from continued OT services in home health to maximize functional abilities.       This writer served as the pts primary occupational therapist throughout their stay at the [facility].     - Berkley Harrison MS OTR/L, CSRS  - [phone]

## 2025-06-13 ENCOUNTER — HOSPITAL ENCOUNTER (OUTPATIENT)
Dept: RADIOLOGY | Age: 65
Discharge: HOME | End: 2025-06-13
Attending: INTERNAL MEDICINE
Payer: COMMERCIAL

## 2025-06-13 ENCOUNTER — HOSPITAL ENCOUNTER (OUTPATIENT)
Dept: RADIOLOGY | Age: 65
Discharge: HOME | End: 2025-06-13
Attending: INTERNAL MEDICINE

## 2025-06-13 ENCOUNTER — APPOINTMENT (OUTPATIENT)
Dept: RADIOLOGY | Age: 65
End: 2025-06-13
Payer: COMMERCIAL

## 2025-06-13 LAB — GLUCOSE SERPL-MCNC: 115 MG/DL (ref 65–140)

## 2025-06-13 PROCEDURE — 82948 REAGENT STRIP/BLOOD GLUCOSE: CPT

## 2025-06-13 PROCEDURE — A9552 F18 FDG: HCPCS

## 2025-06-13 PROCEDURE — 78815 PET IMAGE W/CT SKULL-THIGH: CPT

## 2025-06-13 NOTE — CASE COMMUNICATION
Pt is now discharged from [facility] services and has been referred to the outpatient setting to continue speech and physical therapy. Scripts requested via VM to PCP's office this date.  Assisted pt's wife in calling Physical Therapy at [facility] to schedule initial evaluations.  Pt/wife requested a delay in initiation of services due to chemo planned for next week.

## 2025-06-16 ENCOUNTER — HOSPITAL ENCOUNTER (INPATIENT)
Facility: HOSPITAL | Age: 65
LOS: 7 days | Discharge: HOME | DRG: 847 | End: 2025-06-23
Attending: STUDENT IN AN ORGANIZED HEALTH CARE EDUCATION/TRAINING PROGRAM | Admitting: INTERNAL MEDICINE
Payer: COMMERCIAL

## 2025-06-16 DIAGNOSIS — C83.390 PRIMARY CNS LYMPHOMA: Primary | ICD-10-CM

## 2025-06-16 PROBLEM — N39.0 UTI (URINARY TRACT INFECTION): Status: ACTIVE | Noted: 2025-06-16

## 2025-06-16 LAB
ALBUMIN SERPL BCG-MCNC: 4 G/DL (ref 3.5–5)
ALP SERPL-CCNC: 77 U/L (ref 34–104)
ALT SERPL W P-5'-P-CCNC: 10 U/L (ref 7–52)
ANION GAP SERPL CALCULATED.3IONS-SCNC: 5 MMOL/L (ref 4–13)
AST SERPL W P-5'-P-CCNC: 12 U/L (ref 13–39)
BACTERIA UR QL AUTO: NORMAL /HPF
BACTERIA UR QL AUTO: NORMAL /HPF
BASOPHILS # BLD AUTO: 0.15 THOUSANDS/ÂΜL (ref 0–0.1)
BASOPHILS NFR BLD AUTO: 2 % (ref 0–1)
BILIRUB SERPL-MCNC: 0.3 MG/DL (ref 0.2–1)
BILIRUB UR QL STRIP: NEGATIVE
BILIRUB UR QL STRIP: NEGATIVE
BUN SERPL-MCNC: 19 MG/DL (ref 5–25)
CALCIUM SERPL-MCNC: 9 MG/DL (ref 8.4–10.2)
CHLORIDE SERPL-SCNC: 102 MMOL/L (ref 96–108)
CLARITY UR: CLEAR
CLARITY UR: CLEAR
CO2 SERPL-SCNC: 30 MMOL/L (ref 21–32)
COLOR UR: ABNORMAL
COLOR UR: COLORLESS
CREAT SERPL-MCNC: 1.01 MG/DL (ref 0.6–1.3)
EOSINOPHIL # BLD AUTO: 0.18 THOUSAND/ÂΜL (ref 0–0.61)
EOSINOPHIL NFR BLD AUTO: 2 % (ref 0–6)
ERYTHROCYTE [DISTWIDTH] IN BLOOD BY AUTOMATED COUNT: 18.1 % (ref 11.6–15.1)
EST. AVERAGE GLUCOSE BLD GHB EST-MCNC: 111 MG/DL
GFR SERPL CREATININE-BSD FRML MDRD: 77 ML/MIN/1.73SQ M
GLUCOSE SERPL-MCNC: 160 MG/DL (ref 65–140)
GLUCOSE SERPL-MCNC: 175 MG/DL (ref 65–140)
GLUCOSE SERPL-MCNC: 203 MG/DL (ref 65–140)
GLUCOSE SERPL-MCNC: 212 MG/DL (ref 65–140)
GLUCOSE UR STRIP-MCNC: ABNORMAL MG/DL
GLUCOSE UR STRIP-MCNC: NEGATIVE MG/DL
HBA1C MFR BLD: 5.5 %
HCT VFR BLD AUTO: 30.2 % (ref 36.5–49.3)
HGB BLD-MCNC: 9.6 G/DL (ref 12–17)
HGB UR QL STRIP.AUTO: NEGATIVE
HGB UR QL STRIP.AUTO: NEGATIVE
IMM GRANULOCYTES # BLD AUTO: 0.11 THOUSAND/UL (ref 0–0.2)
IMM GRANULOCYTES NFR BLD AUTO: 1 % (ref 0–2)
KETONES UR STRIP-MCNC: NEGATIVE MG/DL
KETONES UR STRIP-MCNC: NEGATIVE MG/DL
LEUKOCYTE ESTERASE UR QL STRIP: NEGATIVE
LEUKOCYTE ESTERASE UR QL STRIP: NEGATIVE
LYMPHOCYTES # BLD AUTO: 1.5 THOUSANDS/ÂΜL (ref 0.6–4.47)
LYMPHOCYTES NFR BLD AUTO: 19 % (ref 14–44)
MAGNESIUM SERPL-MCNC: 1.7 MG/DL (ref 1.9–2.7)
MCH RBC QN AUTO: 30.8 PG (ref 26.8–34.3)
MCHC RBC AUTO-ENTMCNC: 31.8 G/DL (ref 31.4–37.4)
MCV RBC AUTO: 97 FL (ref 82–98)
MONOCYTES # BLD AUTO: 0.55 THOUSAND/ÂΜL (ref 0.17–1.22)
MONOCYTES NFR BLD AUTO: 7 % (ref 4–12)
NEUTROPHILS # BLD AUTO: 5.37 THOUSANDS/ÂΜL (ref 1.85–7.62)
NEUTS SEG NFR BLD AUTO: 69 % (ref 43–75)
NITRITE UR QL STRIP: NEGATIVE
NITRITE UR QL STRIP: NEGATIVE
NON-SQ EPI CELLS URNS QL MICRO: NORMAL /HPF
NON-SQ EPI CELLS URNS QL MICRO: NORMAL /HPF
NRBC BLD AUTO-RTO: 0 /100 WBCS
PH UR STRIP.AUTO: 5.5 [PH]
PH UR STRIP.AUTO: 7 [PH]
PLATELET # BLD AUTO: 341 THOUSANDS/UL (ref 149–390)
PMV BLD AUTO: 10.6 FL (ref 8.9–12.7)
POTASSIUM SERPL-SCNC: 4 MMOL/L (ref 3.5–5.3)
PROT SERPL-MCNC: 6.3 G/DL (ref 6.4–8.4)
PROT UR STRIP-MCNC: ABNORMAL MG/DL
PROT UR STRIP-MCNC: NEGATIVE MG/DL
RBC # BLD AUTO: 3.12 MILLION/UL (ref 3.88–5.62)
RBC #/AREA URNS AUTO: NORMAL /HPF
RBC #/AREA URNS AUTO: NORMAL /HPF
SODIUM SERPL-SCNC: 137 MMOL/L (ref 135–147)
SP GR UR STRIP.AUTO: 1.01 (ref 1–1.03)
SP GR UR STRIP.AUTO: 1.02 (ref 1–1.03)
UROBILINOGEN UR STRIP-ACNC: <2 MG/DL
UROBILINOGEN UR STRIP-ACNC: <2 MG/DL
WBC # BLD AUTO: 7.86 THOUSAND/UL (ref 4.31–10.16)
WBC #/AREA URNS AUTO: NORMAL /HPF
WBC #/AREA URNS AUTO: NORMAL /HPF

## 2025-06-16 PROCEDURE — 83036 HEMOGLOBIN GLYCOSYLATED A1C: CPT | Performed by: INTERNAL MEDICINE

## 2025-06-16 PROCEDURE — 36569 INSJ PICC 5 YR+ W/O IMAGING: CPT

## 2025-06-16 PROCEDURE — 82948 REAGENT STRIP/BLOOD GLUCOSE: CPT

## 2025-06-16 PROCEDURE — 85025 COMPLETE CBC W/AUTO DIFF WBC: CPT | Performed by: INTERNAL MEDICINE

## 2025-06-16 PROCEDURE — 02HV33Z INSERTION OF INFUSION DEVICE INTO SUPERIOR VENA CAVA, PERCUTANEOUS APPROACH: ICD-10-PCS | Performed by: INTERNAL MEDICINE

## 2025-06-16 PROCEDURE — 83735 ASSAY OF MAGNESIUM: CPT | Performed by: INTERNAL MEDICINE

## 2025-06-16 PROCEDURE — 80053 COMPREHEN METABOLIC PANEL: CPT | Performed by: INTERNAL MEDICINE

## 2025-06-16 PROCEDURE — 81001 URINALYSIS AUTO W/SCOPE: CPT | Performed by: PHYSICIAN ASSISTANT

## 2025-06-16 PROCEDURE — 81001 URINALYSIS AUTO W/SCOPE: CPT | Performed by: STUDENT IN AN ORGANIZED HEALTH CARE EDUCATION/TRAINING PROGRAM

## 2025-06-16 PROCEDURE — 87086 URINE CULTURE/COLONY COUNT: CPT | Performed by: STUDENT IN AN ORGANIZED HEALTH CARE EDUCATION/TRAINING PROGRAM

## 2025-06-16 PROCEDURE — 99255 IP/OBS CONSLTJ NEW/EST HI 80: CPT | Performed by: INTERNAL MEDICINE

## 2025-06-16 PROCEDURE — C1751 CATH, INF, PER/CENT/MIDLINE: HCPCS

## 2025-06-16 PROCEDURE — 99223 1ST HOSP IP/OBS HIGH 75: CPT | Performed by: INTERNAL MEDICINE

## 2025-06-16 RX ORDER — ENOXAPARIN SODIUM 100 MG/ML
40 INJECTION SUBCUTANEOUS DAILY
Status: DISCONTINUED | OUTPATIENT
Start: 2025-06-16 | End: 2025-06-23 | Stop reason: HOSPADM

## 2025-06-16 RX ORDER — ALLOPURINOL 300 MG/1
300 TABLET ORAL DAILY
Status: DISCONTINUED | OUTPATIENT
Start: 2025-06-16 | End: 2025-06-23 | Stop reason: HOSPADM

## 2025-06-16 RX ORDER — QUETIAPINE FUMARATE 25 MG/1
12.5 TABLET, FILM COATED ORAL 2 TIMES DAILY
Status: DISCONTINUED | OUTPATIENT
Start: 2025-06-16 | End: 2025-06-23 | Stop reason: HOSPADM

## 2025-06-16 RX ORDER — SENNOSIDES 8.6 MG
17.2 TABLET ORAL
Status: DISCONTINUED | OUTPATIENT
Start: 2025-06-16 | End: 2025-06-23 | Stop reason: HOSPADM

## 2025-06-16 RX ORDER — SODIUM CHLORIDE 9 MG/ML
20 INJECTION, SOLUTION INTRAVENOUS ONCE AS NEEDED
Status: CANCELLED | OUTPATIENT
Start: 2025-06-16

## 2025-06-16 RX ORDER — SODIUM CHLORIDE 9 MG/ML
20 INJECTION, SOLUTION INTRAVENOUS ONCE AS NEEDED
Status: CANCELLED | OUTPATIENT
Start: 2025-07-02

## 2025-06-16 RX ORDER — DOCUSATE SODIUM 100 MG/1
100 CAPSULE, LIQUID FILLED ORAL 2 TIMES DAILY
Status: DISCONTINUED | OUTPATIENT
Start: 2025-06-16 | End: 2025-06-23 | Stop reason: HOSPADM

## 2025-06-16 RX ORDER — INSULIN LISPRO 100 [IU]/ML
1-5 INJECTION, SOLUTION INTRAVENOUS; SUBCUTANEOUS
Status: DISCONTINUED | OUTPATIENT
Start: 2025-06-16 | End: 2025-06-18

## 2025-06-16 RX ORDER — ACETAMINOPHEN 325 MG/1
650 TABLET ORAL EVERY 6 HOURS PRN
Status: DISCONTINUED | OUTPATIENT
Start: 2025-06-16 | End: 2025-06-23 | Stop reason: HOSPADM

## 2025-06-16 RX ORDER — ONDANSETRON 2 MG/ML
4 INJECTION INTRAMUSCULAR; INTRAVENOUS EVERY 6 HOURS PRN
Status: DISCONTINUED | OUTPATIENT
Start: 2025-06-16 | End: 2025-06-23 | Stop reason: HOSPADM

## 2025-06-16 RX ORDER — CIPROFLOXACIN 500 MG/1
500 TABLET, FILM COATED ORAL EVERY 12 HOURS SCHEDULED
Status: DISCONTINUED | OUTPATIENT
Start: 2025-06-16 | End: 2025-06-18

## 2025-06-16 RX ORDER — PANTOPRAZOLE SODIUM 40 MG/1
40 TABLET, DELAYED RELEASE ORAL
Status: DISCONTINUED | OUTPATIENT
Start: 2025-06-16 | End: 2025-06-23 | Stop reason: HOSPADM

## 2025-06-16 RX ORDER — LEUCOVORIN CALCIUM 25 MG/1
25 TABLET ORAL EVERY 6 HOURS
OUTPATIENT
Start: 2025-07-02

## 2025-06-16 RX ORDER — SODIUM CHLORIDE 9 MG/ML
20 INJECTION, SOLUTION INTRAVENOUS ONCE AS NEEDED
OUTPATIENT
Start: 2025-06-30

## 2025-06-16 RX ORDER — AMOXICILLIN 500 MG/1
500 CAPSULE ORAL EVERY 8 HOURS SCHEDULED
COMMUNITY
End: 2025-06-23

## 2025-06-16 RX ORDER — ACETAMINOPHEN 325 MG/1
650 TABLET ORAL ONCE
Status: CANCELLED
Start: 2025-07-02 | End: 2025-07-02

## 2025-06-16 RX ORDER — ATORVASTATIN CALCIUM 20 MG/1
20 TABLET, FILM COATED ORAL DAILY
Status: DISCONTINUED | OUTPATIENT
Start: 2025-06-16 | End: 2025-06-23 | Stop reason: HOSPADM

## 2025-06-16 RX ADMIN — PANTOPRAZOLE SODIUM 40 MG: 40 TABLET, DELAYED RELEASE ORAL at 12:10

## 2025-06-16 RX ADMIN — ATORVASTATIN CALCIUM 20 MG: 20 TABLET, FILM COATED ORAL at 12:10

## 2025-06-16 RX ADMIN — CIPROFLOXACIN 500 MG: 500 TABLET ORAL at 12:10

## 2025-06-16 RX ADMIN — QUETIAPINE FUMARATE 12.5 MG: 25 TABLET ORAL at 20:04

## 2025-06-16 RX ADMIN — QUETIAPINE FUMARATE 12.5 MG: 25 TABLET ORAL at 12:10

## 2025-06-16 RX ADMIN — SODIUM BICARBONATE 150 ML/HR: 84 INJECTION, SOLUTION INTRAVENOUS at 12:30

## 2025-06-16 RX ADMIN — ALLOPURINOL 300 MG: 300 TABLET ORAL at 12:10

## 2025-06-16 RX ADMIN — DOCUSATE SODIUM 100 MG: 100 CAPSULE, LIQUID FILLED ORAL at 12:10

## 2025-06-16 RX ADMIN — CIPROFLOXACIN 500 MG: 500 TABLET ORAL at 20:04

## 2025-06-16 RX ADMIN — ENOXAPARIN SODIUM 40 MG: 40 INJECTION SUBCUTANEOUS at 12:10

## 2025-06-16 RX ADMIN — SODIUM BICARBONATE 200 ML/HR: 84 INJECTION, SOLUTION INTRAVENOUS at 18:37

## 2025-06-16 RX ADMIN — INSULIN LISPRO 1 UNITS: 100 INJECTION, SOLUTION INTRAVENOUS; SUBCUTANEOUS at 22:01

## 2025-06-16 RX ADMIN — SENNOSIDES 17.2 MG: 8.6 TABLET, FILM COATED ORAL at 12:10

## 2025-06-16 RX ADMIN — DOCUSATE SODIUM 100 MG: 100 CAPSULE, LIQUID FILLED ORAL at 17:09

## 2025-06-16 RX ADMIN — INSULIN LISPRO 2 UNITS: 100 INJECTION, SOLUTION INTRAVENOUS; SUBCUTANEOUS at 12:26

## 2025-06-16 RX ADMIN — INSULIN LISPRO 1 UNITS: 100 INJECTION, SOLUTION INTRAVENOUS; SUBCUTANEOUS at 17:09

## 2025-06-16 NOTE — CONSULTS
Medical Oncology/Hematology Consult Note  Alexis Dennison, male, 64 y.o., 1960,  [facility] 916-01, [phone]     Assessment and Plan  1. Primary central nervous system (CNS) lymphoma,  double expresser    DLBCL NOS, non-germinal center, Ki-67 60-70, BCL6 rearrangement)   Diagnosed in 4/ 2025  Established with Dr. Phan     On Q-14 days High-dose IV Methotrexate   C1 (4/18) 8 g/m²  C2 (5/2) 3 g/m², dose-reduced due to LETY, delayed x1 day due to E. coli bacteremia   C3 (5/21) 3 g/m²   C4 is scheduled for today 06/04/25: 3 g/m² ; planned today 6/16/25     S/p Rituximab x 5 doses 4/17/25 - 5/23/25 ; next Rituxan as planned on day 3 likely in patient  Patient had prescription for temozolomide total to 90 mg a day on days 7 through 11    PLAN   - urine alkalization per protocol ; IV Na Bicarb, Q6H UA   - daily CBC and CMP   - start MTX when PH >= 7.4   - MTX level 24H post IV start time   - Q6H Leucovorin 24 hours post MTX   - Outpatient MRI Brain is scheduled 6/26/25         2.  Recent history of UTI   UA 6/12/2025 large leukocytes and positive nitrites  He noted occasional but not all the time dysuria  continue ciprofloxacin  He is afebrile WBC not elevated  Sending urine culture        Outpatient follow up plan: With Dr. Phan  Communication with patient/family:  I did update the patient   Communication with team:  Did communicate with Dr. Michael, primary team.   I did review this patient with Dr. Phan and she is in agreement with this plan.            Reason for consultation: planned Inpatient chemo for CNS lymphoma     History of present illness:  Alexis Dennison is a 64 y.o. male with primary CNS (Dx 4/14/2025, double expressor (BCL2+/MYC+), DLBCL NOS, non-germinal center, Ki-67 60-70, BCL6 rearrangement), DM2 with baseline b/l LE neuropathy, NAFLD, and HTN with his treatment course of high-dose MTX + rituximab + temozolomide patient is presenting today for inpatient planned chemotherapy mainly IV  methotrexate and planned rituximab on day 3 and he is to start temozolomide on day 7-11    Patient seen and examined today during rounding with my attending, he is sitting in the recliner in no acute distress, flat affect noted, baseline and some mild memory deficit; he also noted some occasional dysuria, per him not all the time, denies any blood in the urine, otherwise review of system X12 was unremarkable      Review of Systems  - GENERAL: Negative for any nausea, vomiting, fevers, chills, or weight loss.  - HEENT: Negative for any head/Neck trauma, pain, double/blurry vision, sinusitis, rhinitis, nose bleeding.  - CARDIAC: Negative for any chest pain, palpitation, Dyspnea on exertion, peripheral edema.  - PULMONARY: Negative for any SOB, cough, wheezing.   - GASTROINTESTINAL: Negative for any abdominal pain, N/V/D/C, blood in stool.   - GENITOURINARY: Occasional/sometimes dysuria not all the time, negative for any , hematuria, incontinence.  - NEUROLOGIC: Negative for any muscle weakness, numbness/tingling, memory changes.    - MUSCULOSKELETAL: Negative for any joint pains/swelling, limited ROM.   - INTEGUMENTARY: Negative for any rashes, cuts/ lesions.  - HEMATOLOGIC: Negative for any abnormal bruising, frequent infections or bleeding.    Oncology History:   Cancer Staging   No matching staging information was found for the patient.    Oncology History   Primary CNS lymphoma   4/16/2025 Initial Diagnosis    Primary CNS lymphoma     4/17/2025 -  Chemotherapy    leucovorin (WELLCOVORIN), 25 mg, 3 of 8 cycles  Administration: 25 mg (4/19/2025), 25 mg (4/19/2025), 25 mg (4/20/2025), 25 mg (4/20/2025), 25 mg (4/20/2025), 25 mg (4/21/2025), 25 mg (5/23/2025), 25 mg (5/24/2025), 25 mg (5/24/2025), 25 mg (5/24/2025), 25 mg (5/24/2025), 25 mg (5/25/2025), 25 mg (5/25/2025)  riTUXimab (RITUXAN) subsequent titrated chemo infusion, 375 mg/m2 = 765 mg (original dose ), 0 of 5 cycles  Dose modification: 375 mg/m2 (Cycle  4)  leucovorin calcium IVPB, 25 mg, 3 of 8 cycles  Administration: 25 mg (5/3/2025), 25 mg (5/3/2025), 25 mg (5/4/2025), 25 mg (5/4/2025), 25 mg (5/4/2025), 25 mg (5/4/2025), 25 mg (5/22/2025), 25 mg (5/23/2025), 25 mg (5/23/2025), 25 mg (5/23/2025), 25 mg (5/24/2025)  methotrexate (PF) IVPB, 16,320 mg, 3 of 8 cycles  Dose modification: 8,000 mg/m2 (original dose 3,500 mg/m2, Cycle 2, Reason: Other (Must fill in a comment), Comment: Patient with aggressive PCNSL), 3,000 mg/m2 (original dose 3,500 mg/m2, Cycle 2, Reason: Other (Must fill in a comment), Comment: Elevated SCr)  Administration: 6,000 mg (5/2/2025), 6,000 mg (5/21/2025), 16,000 mg (4/18/2025)  riTUXimab (RITUXAN) first titrated chemo infusion, 765 mg (100 % of original dose 375 mg/m2), 3 of 3 cycles  Dose modification: 375 mg/m2 (original dose 375 mg/m2, Cycle 1)  Administration: 800 mg (4/17/2025), 700 mg (4/24/2025), 800 mg (5/3/2025), 800 mg (5/10/2025), 800 mg (5/23/2025)  riTUXimab-ABBS (TRUXIMA) first titrated infusion, 375 mg/m2 = 765 mg, 1 of 1 cycle         Past Medical History:   Past Medical History[1]    Past Surgical History[2]    Family History[3]    Social History[4]    Current Medications[5]      Medications Prior to Admission:     acetaminophen (TYLENOL) 325 mg tablet    allopurinol (ZYLOPRIM) 300 mg tablet    atorvastatin (LIPITOR) 20 mg tablet    ciprofloxacin (CIPRO) 500 mg tablet    docusate sodium (COLACE) 100 mg capsule    melatonin 3 mg    omeprazole (PriLOSEC) 20 mg delayed release capsule    QUEtiapine (SEROquel) 25 mg tablet    senna (SENOKOT) 8.6 mg    SITaglip Phos-metFORMIN HCl ER (Janumet XR)  MG TB24    temozolomide (TEMODAR) 20 mg capsule    temozolomide (TEMODAR) 250 MG capsule    Allergies[6]      Physical Exam:     There were no vitals taken for this visit.    Physical Exam  - GEN: Flat affect noted ; appears well, alert and oriented x 3, pleasant and cooperative, in no acute distress  - HEENT: Anicteric, mucous  membranes moist, PERRL and EOMI   - NECK: No lymphadenopathy, JVD or carotid bruits   - HEART: RRR, normal S1 and S2, no murmurs, clicks, gallops or rubs   - LUNGS: Clear to auscultation bilaterally; no wheezes, rales, or rhonchi  - ABDOMEN: Normal bowel sounds, soft, no tenderness, no distention, no organomegaly or masses felt on exam.   - EXTREMITIES: Peripheral pulses normal; no clubbing, cyanosis, or edema  - NEURO: Slow speech/mild memory deficit noted, no focal findings, CN II-XII are grossly intact.   - Musculoskeletal: 5/5 strength, normal ROM, no swollen or erythematous joints.   - SKIN: Normal without suspicious lesions on exposed skin      No results found for this or any previous visit (from the past 48 hours).    NM PET CT skull base to mid thigh  Result Date: 6/16/2025  Narrative: PET/CT SCAN INDICATION: C83.390: Primary central nervous system lymphoma, restaging MODIFIER: PS COMPARISON: CT of abdomen and pelvis dated 5/18/2025, MRI of the brain, cervical spine, thoracic spine, and lumbar spine dated 4/19/25, CT angiography of the chest, abdomen, and pelvis dated 3/29/2025 CELL TYPE: Involved by pt's large B-cell lymphoma (bx 4/13/25 CSF +) TECHNIQUE:   8.2 mCi F-18-FDG administered IV. Multiplanar attenuation corrected and non attenuation corrected PET images are available for interpretation, and contiguous, low dose, axial CT sections were obtained from the skull vertex through the femurs. Intravenous contrast material was not utilized. This examination, like all CT scans performed in the [facility], was performed utilizing techniques to minimize radiation dose exposure, including the use of iterative reconstruction and automated exposure control. Fasting serum glucose: 115 mg/dl FINDINGS: VISUALIZED BRAIN: On PET image 37 there is subtle asymmetric focal activity involving the left cerebral hemisphere slightly posterior to the left frontal horn which is difficult to correlate on  low-dose unenhanced CT but measures approximately 1.5 cm on PET imaging with max SUV of 7.7; this finding is in the expected location patient's known enhancing intracranial mass which is better characterized on MRI of the brain dated 4/20/25 and could reflect mild hypermetabolic malignant activity versus asymmetric activity involving the left basal ganglia which is in this region. Patient's known intracranial malignancy is better characterized on MRI of the brain given background physiologic brain activity and continued follow-up with MRI of the brain is recommended. HEAD/NECK: On image 20 there is mild FDG activity associated with cutaneous/subcutaneous soft tissue density involving the left frontal scalp which appears superficial to a siomara hole in the left frontal calvarium extending from images 21 through 24 of series 3. On image 20 of series 3 this finding measures approximately 1.3 cm in length with max SUV of 3.9 and possibly reflects inflammatory activity although correlation is recommended to exclude the possibility of cutaneous/subcutaneous malignancy of low metabolic  activity. CT images: Essentially stable prominence to the lateral ventricles which is better characterized on prior dedicated MRI of the brain. Stable asymmetric nodular enlargement of the left thyroid gland which was better characterized on thyroid ultrasound dated 6//25 with recommendation for tissue sampling was made. On image 93 of series 3 there is a nonspecific mildly prominent common nonpathologic in size left perithyroidal/lower cervical lymph node measuring 6 mm in short axis. CHEST: No FDG avid soft tissue lesions are seen. CT images: Trace gynecomastia. Atherosclerotic vascular calcifications including those of the coronary arteries are noted. Tiny calcified right middle lobe granuloma. On image 129 of series 3 there is a new 6 mm left lower lobe lung nodule, possibly related to adjacent subtle patchy likely inflammatory  groundglass airspace disease although given known malignancy, short interval follow-up with CT of chest in 3 months is recommended to ensure stability. ABDOMEN: Fairly diffuse nonspecific nonuniform bowel activity, possibly physiologic which limits evaluation for underlying hypermetabolic bowel malignancy. CT images: Stable right hepatic hypodensity which was better characterized on prior MRI of abdomen where it was characterized as a cyst. Atherosclerotic vascular calcifications are noted. PELVIS: No FDG avid soft tissue lesions are seen. CT images: Enlarged prostate gland. Mild nonspecific diffuse mural thickening of the urinary bladder wall which is difficult to evaluate secondary to under distention. OSSEOUS STRUCTURES: No FDG avid lesions are seen. CT images: No significant findings.     Impression: 1.  Subtle asymmetric focal FDG activity involving the left cerebral hemisphere in the expected location of patient's known enhancing intracranial mass which could reflect mild hypermetabolic malignancy versus asymmetric physiologic activity involving the left basal ganglia. Patient's known intracranial malignancy is better characterized on MRI of the brain and continued follow-up with MRI of the brain is recommended as clinically indicated. 2.  Mild nonspecific FDG activity associated with cutaneous/subcutaneous soft tissue density involving the left frontal scalp which appears superficial to a siomara hole in the left frontal calvarium, possibly reflecting inflammatory activity. Correlation with direct visualization is recommended to exclude the possibility of cutaneous/subcutaneous malignancy of low metabolic activity. 3.  New 6 mm left lower lobe lung nodule, possibly inflammatory. Short interval follow-up with CT of chest in 3 months is recommended to ensure stability. 4.  Please see above for details and additional findings. The study was marked in EPIC for significant notification. The study was marked in Epic  for follow-up. Workstation performed: [id number]     US thyroid  Result Date: 6/11/2025  Narrative: THYROID ULTRASOUND INDICATION: E04.1: Nontoxic single thyroid nodule. COMPARISON: CTA head and neck 3/24/2025 TECHNIQUE: Ultrasound of the thyroid was performed with a high frequency linear transducer in transverse and sagittal planes including volumetric imaging sweeps as well as traditional still imaging technique. FINDINGS: Thyroid texture: Thyroid is mildly inhomogeneous and hypervascular. Right lobe: 5.0 x 1.5 x 1.9 cm. Volume 6.9 mL Left lobe: 6.0 x 3.1 x 2.8 cm. Volume 24.9 mL Isthmus: 0.3 cm. Nodule #1. Image 33. LEFT anterior mid to lower pole nodule. The length in depth is difficult to accurately determine as it is abutting an additional posterior nodule, best estimate measuring 3.1 x 1.4 x 2.1 cm (series 200 image 105 of 200). No prior ultrasound. COMPOSITION: 2 points, solid or almost completely solid. ECHOGENICITY: 1 point, hyperechoic or isoechoic. SHAPE: 0 points, wider-than-tall. MARGIN: 0 points, ill-defined. ECHOGENIC FOCI: 0 points, none or large comet-tail artifacts. TI-RADS Classification: TR 3 (3 points), FNA if >/= 2.5 cm. Follow if >/= 1.5 cm. Nodule #2. Image 36. LEFT posterior midgland nodule measuring 2.3 x 1.5 x 2.2 cm. No prior ultrasound. COMPOSITION: 2 points, solid or almost completely solid. ECHOGENICITY: 1 point, hyperechoic or isoechoic. SHAPE: 0 points, wider-than-tall. MARGIN: 0 points, smooth. ECHOGENIC FOCI: 0 points, none or large comet-tail artifacts. TI-RADS Classification: TR 3 (3 points), FNA if >/= 2.5 cm. Follow if >/= 1.5 cm. There are additional nodules of lesser size and/or TI-RADS score. These do not necessitate additional evaluation based on ACR criteria.     Impression: Asymmetric left thyroid enlargement with associated nodules corresponding to previous CT findings. The following meet current ACR criteria for recommending ultrasound guided biopsy: 1. LEFT anterior  mid to lower pole 3.1 cm nodule (Image 33). 2. LEFT posterior midgland nodule measuring 2.3 cm (Image 36). Reference: ACR Thyroid Imaging, Reporting and Data System (TI-RADS): White Paper of the ACR TI-RADS Committee. J AM Winifred Radiol 2017;14:587-595. Additional recommendations based on American Thyroid Association 2015 guidelines. The study was marked in EPIC for significant notification and follow-up. Workstation performed: [id number]     CT abdomen pelvis w contrast  Result Date: 5/19/2025  Narrative: CT ABDOMEN AND PELVIS WITH IV CONTRAST INDICATION: recurrent bacteremia, look for source. Blood culture and urine culture positive for gram-negative rods. COMPARISON: April 30, 2025. TECHNIQUE: CT examination of the abdomen and pelvis was performed. Multiplanar 2D reformatted images were created from the source data. This examination, like all CT scans performed in the  Network, was performed utilizing techniques to minimize radiation dose exposure, including the use of iterative reconstruction and automated exposure control. Radiation dose length product (DLP) for this visit: 390.85 mGy-cm. IV Contrast: 85 mL of iohexol (OMNIPAQUE) Enteric Contrast: Not administered. FINDINGS: ABDOMEN LOWER CHEST: A small calcified granuloma is redemonstrated anteriorly at the right lung base. There is coronary artery disease. A catheter terminates in the right atrium. LIVER/BILIARY TREE: There is a 13 x 9 mm cyst posteriorly in the right lobe of the liver, similar to the prior study. GALLBLADDER: No calcified gallstones. No pericholecystic inflammatory change. SPLEEN: Unremarkable. PANCREAS: Unremarkable. ADRENAL GLANDS: Unremarkable. KIDNEYS/URETERS: Mild bilateral nonspecific perinephric stranding, increased somewhat compared with April 30, 2025. No hydronephrosis bilaterally. STOMACH AND BOWEL: No bowel obstruction. No pericolonic inflammatory change. APPENDIX: Normal. ABDOMINOPELVIC CAVITY: There is a small  amount of free fluid, best demonstrated in the right lower quadrant and paracolic gutters. No pneumoperitoneum. VESSELS: Atherosclerosis. No abdominal aortic aneurysm. PELVIS REPRODUCTIVE ORGANS: Unremarkable for patient's age. URINARY BLADDER: Unremarkable. ABDOMINAL WALL/INGUINAL REGIONS: There is some gas within the subcutaneous fat of the anterior abdominal walls near the level of the umbilicus, left more so than right, possibly related to recent injections. Small fat-containing left inguinal hernia. BONES: No acute fracture or suspicious osseous lesion. Spinal degenerative changes, most notably at L5-S1.     Impression: There is mild bilateral nonspecific perinephric stranding, however, this does appear somewhat increased compared with April 30, 2025. Correlation with urinalysis and urine culture and sensitivity is recommended. There is a small amount of free fluid. Other nonemergent findings as above. Workstation performed: [id number]       Labs and pertinent reports reviewed.      This note has been generated by voice recognition software system.  Therefore, there may be spelling, grammar, and or syntax errors. Please contact if questions arise.    Simba Phillips DO   Hematology and Medical Oncology - PGY V  [facility]          [1]   Past Medical History:  Diagnosis Date    Colon polyp     Diabetes mellitus (HCC)     GERD (gastroesophageal reflux disease)     Hyperlipidemia     Hypertension     Liver disease     Sleep apnea    [2]   Past Surgical History:  Procedure Laterality Date    COLONOSCOPY      CYST REMOVAL      back    FL LUMBAR PUNCTURE DIAGNOSTIC  3/31/2025    FL LUMBAR PUNCTURE DIAGNOSTIC  4/7/2025    MI EXC B9 LESION MRGN XCP SK TG T/A/L 0.6-1.0 CM N/A 6/7/2023    Procedure: EXCISION  BIOPSY LESION/MASS ABDOMINAL/CHEST WALL;  Surgeon: Trevor Villavicencio MD;  Location:  MAIN OR;  Service: General    THIRD VENTRICULOSTOMY Left 4/14/2025    Procedure: Left frontal endoscopic  biopsy of ventricular mass and placement of EVD;  Surgeon: Paul Causey MD;  Location: [facility];  Service: Neurosurgery   [3]   Family History  Problem Relation Name Age of Onset    Cancer Mother Kellen Renee     Cancer Father Amrit Dennison     Diabetes Paternal Grandfather Shailesh Dennison     Diabetes unspecified Paternal Grandfather Shailesh Dennison         Type 2    Colon polyps Neg Hx      Colon cancer Neg Hx     [4]   Social History  Socioeconomic History    Marital status: /Civil Union   Tobacco Use    Smoking status: Never     Passive exposure: Never    Smokeless tobacco: Never    Tobacco comments:     N/a   Vaping Use    Vaping status: Never Used   Substance and Sexual Activity    Alcohol use: Yes     Alcohol/week: 1.0 standard drink of alcohol     Types: 1 Cans of beer per week     Comment: socially    Drug use: Never    Sexual activity: Not Currently     Partners: Female     Social Drivers of Health     Food Insecurity: Patient Unable To Answer (5/28/2025)    Nursing - Inadequate Food Risk Classification     Ran Out of Food in the Last Year: Patient unable to answer   Transportation Needs: No Transportation Needs (6/12/2025)    OASIS : Transportation     Lack of Transportation (Medical): No     Lack of Transportation (Non-Medical): No     Patient Unable or Declines to Respond: No   Intimate Partner Violence: Patient Unable To Answer (5/28/2025)    Nursing IPS     Physically Hurt by Someone: Patient unable to answer     Hurt or Threatened by Someone: Patient unable to answer   Housing Stability: Patient Unable To Answer (5/28/2025)    Nursing: Inadequate Housing Risk Classification     Unable to Pay for Housing in the Last Year: Patient unable to answer     Has Housing: Patient unable to answer   [5]   Current Facility-Administered Medications:     acetaminophen (TYLENOL) tablet 650 mg, 650 mg, Oral, Q6H PRN, Aelx Michael DO    allopurinol (ZYLOPRIM) tablet 300 mg, 300 mg, Oral, DailyAlex  Joel Michael DO    atorvastatin (LIPITOR) tablet 20 mg, 20 mg, Oral, Daily, Alex Michael DO    ciprofloxacin (CIPRO) tablet 500 mg, 500 mg, Oral, Q12H CAIT, Alex Michael DO    docusate sodium (COLACE) capsule 100 mg, 100 mg, Oral, BID, Alex Michael DO    enoxaparin (LOVENOX) subcutaneous injection 40 mg, 40 mg, Subcutaneous, Daily, Alex Michael DO    insulin lispro (HumALOG/ADMELOG) 100 units/mL subcutaneous injection 1-5 Units, 1-5 Units, Subcutaneous, TID AC **AND** Fingerstick Glucose (POCT), , , TID AC, Alex Michael DO    insulin lispro (HumALOG/ADMELOG) 100 units/mL subcutaneous injection 1-5 Units, 1-5 Units, Subcutaneous, HS, Alex Michael DO    ondansetron (ZOFRAN) injection 4 mg, 4 mg, Intravenous, Q6H PRN, Alex Michael DO    pantoprazole (PROTONIX) EC tablet 40 mg, 40 mg, Oral, Early Morning, Alex Michael DO    QUEtiapine (SEROquel) tablet 12.5 mg, 12.5 mg, Oral, BID, Alex Michael DO    senna (SENOKOT) tablet 17.2 mg, 17.2 mg, Oral, Daily With Lunch, Alex Michael DO  [6] No Known Allergies

## 2025-06-16 NOTE — ASSESSMENT & PLAN NOTE
Recently diagnosed as an outpatient and placed on ciprofloxacin  10-day course was planned for 6/12 through 6/22; will continue on cipro as previously prescribed  .

## 2025-06-16 NOTE — ASSESSMENT & PLAN NOTE
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 coli bacteremia, LETY, and transaminitis.    Presents for inpatient chemotherapy with methotrexate and rituximab  Will obtain CBC with differential, CMP, mag  Consult oncology  Stepdown 2  Continue home allopurinol

## 2025-06-16 NOTE — RESTORATIVE TECHNICIAN NOTE
Restorative Technician Note      Patient Name: Alexis Dennison     [facility] Tech Visit Date: 06/16/25  Note Type: Mobility  Patient Position Upon Consult: Bedside chair  Activity Performed: Ambulated  Assistive Device: Other (Comment) (None)  Patient Position at End of Consult: Bedside chair; All needs within reach; Bed/Chair alarm activated    Sin Ramirez, Restorative Technician

## 2025-06-16 NOTE — PROCEDURES
Venous Access Line Insertion    Date/Time: 6/16/2025 4:00 PM    Performed by: Chery Hemphill RN  Authorized by: Alex Michael DO    Patient location:  Bedside  Other Assisting Provider: Yes (comment)    Consent:     Consent obtained:  Verbal (consent obtained from daughter via telephone)    Consent given by:  Healthcare agent and patient (daughter via phone, patient verbally consented)    Risks discussed:  Arterial puncture, bleeding, infection, incorrect placement, nerve damage and pneumothorax    Alternatives discussed:  No treatment  Universal protocol:     Procedure explained and questions answered to patient or proxy's satisfaction: yes      Immediately prior to procedure, a time out was called: yes      Relevant documents present and verified: yes      Test results available and properly labeled: yes      Radiology Images displayed and confirmed.  If images not available, report reviewed: yes      Required blood products, implants, devices, and special equipment available: yes      Site/side marked: yes      Patient identity confirmed:  Verbally with patient, arm band and provided demographic data  Pre-procedure details:     Hand hygiene: Hand hygiene performed prior to insertion      Sterile barrier technique: All elements of maximal sterile technique followed      Skin preparation:  ChloraPrep    Skin preparation agent: Skin preparation agent completely dried prior to procedure    Procedure details:     Complex Venous Access Line Type: PICC      Complex Venous Access Line Indications: chemotherapy      Catheter tip vessel location: atriocaval junction      Orientation:  Left    Location:  Brachial    Procedural supplies:  Double lumen    Catheter size:  5 Fr (LOT [id number] EXPIR 4/30/26)    Total catheter length (cm):  42    Catheter out on skin (cm):  2    Max flow rate:  999    Arm circumference:  31    Patient evaluated for contraindications to access (i.e. fistula, thrombosis, etc): Yes       Site selection rationale:  Largest, most patent vessel    Approach: percutaneous technique used      Patient position:  Flat    Ultrasound image availability:  Not saved    Sterile ultrasound techniques: Sterile gel and sterile probe covers were used      Number of attempts:  2    Successful placement: yes      Landmarks identified: yes      Vessel of catheter tip end:  Sherlock 3CG confirmed  Anesthesia (see MAR for exact dosages):     Anesthesia method:  Local infiltration    Local anesthetic:  Lidocaine 1% w/o epi (3mL)    Sedation type:  Other (comment) (none required, pt tolerated well)  Post-procedure details:     Post-procedure:  Dressing applied and securement device placed    Assessment:  Blood return through all ports and free fluid flow    Post-procedure complications: none      Patient tolerance of procedure:  Tolerated well, no immediate complications    Observer: Yes      Observer name:  SA,CNA

## 2025-06-16 NOTE — H&P
H&P - Hospitalist   Name: Alexis Dennison 64 y.o. male I MRN: [id number]  Unit/Bed#: [facility] 916-01 I Date of Admission: 6/16/2025   Date of Service: 6/16/2025 I Hospital Day: 0     Assessment & Plan  Primary central nervous system (CNS) lymphoma  recently-diagnosed primary CNS (Dx 4/14/2025, double expressor (BCL2+/MYC+), DLBCL NOS, non-germinal center, Ki-67 60-70, BCL6 rearrangement   treatment course of high-dose MTX + rituximab complicated by sepsis due to recurrent E. coli bacteremia, LETY, and transaminitis.    Presents for inpatient chemotherapy with methotrexate and rituximab  Will obtain CBC with differential, CMP, mag  Consult oncology  Stepdown 2  Continue home allopurinol  Type 2 diabetes mellitus with hyperglycemia, without long-term current use of insulin (HCC)  Controlled with last hemoglobin A1c in February 6 0.6%  Will repeat hemoglobin A1c  Hold home oral regimen  Monitor on sliding scale  Consistent carb diet  Hypoglycemia protocol    HTN, goal below 130/80  Not on any antihypertensives  Continue to monitor  Mixed hyperlipidemia  Continue statin  UTI (urinary tract infection)  Recently diagnosed as an outpatient and placed on ciprofloxacin  10-day course was planned for 6/12 through 6/22; will continue on cipro as previously prescribed  .      VTE Pharmacologic Prophylaxis: VTE Score: 4 Moderate Risk (Score 3-4) - Pharmacological DVT Prophylaxis Ordered: enoxaparin (Lovenox).  Code Status: Level 1 - Full Code   Discussion with family: Patient declined call to .     Anticipated Length of Stay: Patient will be admitted on an inpatient basis with an anticipated length of stay of greater than 2 midnights secondary to inpatient chemotherapy.    History of Present Illness   Chief Complaint: lymphoma    Alexis Dennison is a 64 y.o. male with a PMH of hypertension, hyperlipidemia, recently diagnosed CNS lymphoma, DM 2, who presents for inpatient chemotherapy.  Patient was recently diagnosed in April  with primary CNS lymphoma.  He was started on treatment with methotrexate and rituximab, though the treatment course was complicated by E. coli bacteremia.  He now returns to the hospital for planned inpatient chemotherapy.  At the time my evaluation patient is resting comfortably in bedside chair.  He has no complaints.  Of note per chart review he did recently get diagnosed with a UTI and is currently on p.o. ciprofloxacin.    REVIEW OF SYSTEMS  General Denies fevers or chills. Denies generalized weakness or fatigue.    HEENT Denies hearing or vision changes.   Cardiovascular Denies chest pain. Denies LE swelling. Denies palpitations. Denies dyspnea on exertion.   Respiratory Denies cough. Denies difficulty breathing. Denies shortness of breath.   Genitourinary Denies hematuria. Denies dysuria. Denies difficulty voiding.Denies incontinence.   Gastrointestinal Denies nausea, vomiting, or diarrhea. Denies hematochezia, melena, or hematemesis.    Musculoskeletal Denies arthralgias or myalgias. Denies joint swelling.   Psychiatric  Denies changes in mood. Denies anxiety or depression.   Neurologic Denies headache. Denies lightheadedness/dizziness.Denies numbness/tingling. Denies weakness.   Endocrine Denies weight loss or weight gain. Denies excessive thirst, sweating, urination.         Historical Information   Past Medical History[1]  Past Surgical History[2]  Social History[3]  E-Cigarette/Vaping    E-Cigarette Use Never User      E-Cigarette/Vaping Substances    Nicotine No     THC No     CBD No     Flavoring No     Other No     Unknown No      Family History[4]  Social History:  Marital Status: /Civil Union   Occupation:   Patient Pre-hospital Living Situation: Home  Patient Pre-hospital Level of Mobility: walks  Patient Pre-hospital Diet Restrictions: none    Meds/Allergies   I have reviewed home medications using recent Epic encounter.  Prior to Admission medications    Medication Sig Start Date End Date  Taking? Authorizing Provider   acetaminophen (TYLENOL) 325 mg tablet Take 2 tablets (650 mg total) by mouth every 6 (six) hours as needed for mild pain, moderate pain, severe pain, headaches or fever 5/27/25   Joshua Kaminski DO   allopurinol (ZYLOPRIM) 300 mg tablet Take 1 tablet (300 mg total) by mouth daily 5/28/25   PATI Porras   atorvastatin (LIPITOR) 20 mg tablet Take 20 mg by mouth in the morning.    Historical Provider, MD   ciprofloxacin (CIPRO) 500 mg tablet Take 1 tablet (500 mg total) by mouth every 12 (twelve) hours for 10 days 6/12/25 6/22/25  Leta Phan MD   docusate sodium (COLACE) 100 mg capsule Take 1 capsule (100 mg total) by mouth 2 (two) times a day 5/20/25   Jessica Arreola DO   melatonin 3 mg Take 2 tablets (6 mg total) by mouth daily at bedtime  Patient not taking: Reported on 5/29/2025 5/7/25   Sandee Fuentes MD   omeprazole (PriLOSEC) 20 mg delayed release capsule Take 1 capsule (20 mg total) by mouth daily 5/28/25   PATI Porras   QUEtiapine (SEROquel) 25 mg tablet Take 0.5 tablets (12.5 mg total) by mouth at noon and 0.5 tablets (12.5 mg total) before bedtime. 5/27/25   Joshua Kaminski DO   senna (SENOKOT) 8.6 mg Take 2 tablets (17.2 mg total) by mouth daily with lunch 5/21/25   Jessica Arreola DO   SITaglip Phos-metFORMIN HCl ER (Janumet XR)  MG TB24 Take 1 tablet by mouth in the morning 5/29/25   Edvin Dior PA-C   temozolomide (TEMODAR) 20 mg capsule Take 2 capsules (40 mg total) by mouth daily On days 7 through 11 of each cycle 6/5/25   Leta Phan MD   temozolomide (TEMODAR) 250 MG capsule Take 1 capsule (250 mg total) by mouth daily On days 7 through 11 of each cycle 6/5/25   Leta Phan MD     No Known Allergies    Objective :  Temp:  [97.5 °F (36.4 °C)] 97.5 °F (36.4 °C)  HR:  [78] 78  BP: (132)/(78) 132/78  Resp:  [16] 16  SpO2:  [99 %] 99 %    PHYSICAL EXAM:    Vitals signs reviewed  Constitutional    Awake and cooperative. NAD.   Head/Neck   Normocephalic. Atraumatic.   HEENT   No scleral icterus. EOMI.   Heart   Regular rate and rhythm. No murmurs.   Lungs   Clear to auscultation bilaterally. Respirations unlaboured.   Abdomen   Soft. Nontender. Nondistended.    Skin   Skin color normal. No rashes.   Extremities   No deformities. No peripheral edema.   Neuro   Alert and oriented. No new deficits.   Psych   Mood stable. Affect normal.                 Lab Results: I have reviewed the following results:  Results from last 7 days   Lab Units 06/12/25  0759   WBC Thousand/uL 10.15   HEMOGLOBIN g/dL 10.3*   HEMATOCRIT % 32.3*   PLATELETS Thousands/uL 462*   SEGS PCT % 67   LYMPHO PCT % 21   MONO PCT % 6   EOS PCT % 2     Results from last 7 days   Lab Units 06/12/25  0759   SODIUM mmol/L 141   POTASSIUM mmol/L 4.3   CHLORIDE mmol/L 104   CO2 mmol/L 30   BUN mg/dL 18   CREATININE mg/dL 1.14   ANION GAP mmol/L 7   CALCIUM mg/dL 9.4   ALBUMIN g/dL 4.1   TOTAL BILIRUBIN mg/dL 0.52   ALK PHOS U/L 72   ALT U/L 11   AST U/L 13         Results from last 7 days   Lab Units 06/13/25  0616   POC GLUCOSE mg/dl 115     Lab Results   Component Value Date    HGBA1C 6.6 (H) 02/22/2025    HGBA1C 6.3 (H) 11/09/2024    HGBA1C 6.5 (H) 08/09/2024           Imaging Results Review: I reviewed radiology reports from this admission including: PET scan from 6/16.  Other Study Results Review: No additional pertinent studies reviewed.    Administrative Statements   I have spent a total time of 50 minutes in caring for this patient on the day of the visit/encounter including Diagnostic results, Prognosis, Risks and benefits of tx options, Instructions for management, Patient and family education, Importance of tx compliance, Risk factor reductions, Impressions, Counseling / Coordination of care, Documenting in the medical record, Reviewing/placing orders in the medical record (including tests, medications, and/or procedures), Obtaining or  reviewing history  , and Communicating with other healthcare professionals .    ** Please Note: This note has been constructed using a voice recognition system. **         [1]   Past Medical History:  Diagnosis Date    Colon polyp     Diabetes mellitus (HCC)     GERD (gastroesophageal reflux disease)     Hyperlipidemia     Hypertension     Liver disease     Sleep apnea    [2]   Past Surgical History:  Procedure Laterality Date    COLONOSCOPY      CYST REMOVAL      back    FL LUMBAR PUNCTURE DIAGNOSTIC  3/31/2025    FL LUMBAR PUNCTURE DIAGNOSTIC  4/7/2025    [facility] EXC B9 LESION MRGN XCP SK TG T/A/L 0.6-1.0 CM N/A 6/7/2023    Procedure: EXCISION  BIOPSY LESION/MASS ABDOMINAL/CHEST WALL;  Surgeon: Trevor Villavicencio MD;  Location: [facility];  Service: General    THIRD VENTRICULOSTOMY Left 4/14/2025    Procedure: Left frontal endoscopic biopsy of ventricular mass and placement of EVD;  Surgeon: Paul Causey MD;  Location: [facility];  Service: Neurosurgery   [3]   Social History  Tobacco Use    Smoking status: Never     Passive exposure: Never    Smokeless tobacco: Never    Tobacco comments:     N/a   Vaping Use    Vaping status: Never Used   Substance and Sexual Activity    Alcohol use: Yes     Alcohol/week: 1.0 standard drink of alcohol     Types: 1 Cans of beer per week     Comment: socially    Drug use: Never    Sexual activity: Not Currently     Partners: Female   [4]   Family History  Problem Relation Name Age of Onset    Cancer Mother Kellen Renee     Cancer Father Amrit Dennison     Diabetes Paternal Grandfather Shailesh Calixber     Diabetes unspecified Paternal Grandfather Shailesh Dennison         Type 2    Colon polyps Neg Hx      Colon cancer Neg Hx

## 2025-06-17 LAB
ANION GAP SERPL CALCULATED.3IONS-SCNC: 7 MMOL/L (ref 4–13)
BACTERIA UR CULT: NORMAL
BACTERIA UR QL AUTO: ABNORMAL /HPF
BACTERIA UR QL AUTO: ABNORMAL /HPF
BACTERIA UR QL AUTO: NORMAL /HPF
BACTERIA UR QL AUTO: NORMAL /HPF
BASOPHILS # BLD AUTO: 0.11 THOUSANDS/ÂΜL (ref 0–0.1)
BASOPHILS NFR BLD AUTO: 2 % (ref 0–1)
BILIRUB UR QL STRIP: NEGATIVE
BUN SERPL-MCNC: 15 MG/DL (ref 5–25)
CALCIUM SERPL-MCNC: 8.5 MG/DL (ref 8.4–10.2)
CHLORIDE SERPL-SCNC: 101 MMOL/L (ref 96–108)
CLARITY UR: CLEAR
CO2 SERPL-SCNC: 34 MMOL/L (ref 21–32)
COLOR UR: ABNORMAL
COLOR UR: ABNORMAL
COLOR UR: COLORLESS
COLOR UR: NORMAL
CREAT SERPL-MCNC: 0.97 MG/DL (ref 0.6–1.3)
EOSINOPHIL # BLD AUTO: 0.17 THOUSAND/ÂΜL (ref 0–0.61)
EOSINOPHIL NFR BLD AUTO: 3 % (ref 0–6)
ERYTHROCYTE [DISTWIDTH] IN BLOOD BY AUTOMATED COUNT: 17.7 % (ref 11.6–15.1)
GFR SERPL CREATININE-BSD FRML MDRD: 81 ML/MIN/1.73SQ M
GLUCOSE SERPL-MCNC: 137 MG/DL (ref 65–140)
GLUCOSE SERPL-MCNC: 145 MG/DL (ref 65–140)
GLUCOSE SERPL-MCNC: 161 MG/DL (ref 65–140)
GLUCOSE SERPL-MCNC: 297 MG/DL (ref 65–140)
GLUCOSE SERPL-MCNC: 405 MG/DL (ref 65–140)
GLUCOSE SERPL-MCNC: 422 MG/DL (ref 65–140)
GLUCOSE UR STRIP-MCNC: ABNORMAL MG/DL
GLUCOSE UR STRIP-MCNC: ABNORMAL MG/DL
GLUCOSE UR STRIP-MCNC: NEGATIVE MG/DL
GLUCOSE UR STRIP-MCNC: NEGATIVE MG/DL
HCT VFR BLD AUTO: 26.1 % (ref 36.5–49.3)
HGB BLD-MCNC: 8.6 G/DL (ref 12–17)
HGB UR QL STRIP.AUTO: NEGATIVE
IMM GRANULOCYTES # BLD AUTO: 0.07 THOUSAND/UL (ref 0–0.2)
IMM GRANULOCYTES NFR BLD AUTO: 1 % (ref 0–2)
KETONES UR STRIP-MCNC: NEGATIVE MG/DL
LEUKOCYTE ESTERASE UR QL STRIP: ABNORMAL
LEUKOCYTE ESTERASE UR QL STRIP: NEGATIVE
LYMPHOCYTES # BLD AUTO: 1.82 THOUSANDS/ÂΜL (ref 0.6–4.47)
LYMPHOCYTES NFR BLD AUTO: 29 % (ref 14–44)
MAGNESIUM SERPL-MCNC: 1.7 MG/DL (ref 1.9–2.7)
MCH RBC QN AUTO: 30.9 PG (ref 26.8–34.3)
MCHC RBC AUTO-ENTMCNC: 33 G/DL (ref 31.4–37.4)
MCV RBC AUTO: 94 FL (ref 82–98)
MONOCYTES # BLD AUTO: 0.6 THOUSAND/ÂΜL (ref 0.17–1.22)
MONOCYTES NFR BLD AUTO: 10 % (ref 4–12)
NEUTROPHILS # BLD AUTO: 3.54 THOUSANDS/ÂΜL (ref 1.85–7.62)
NEUTS SEG NFR BLD AUTO: 55 % (ref 43–75)
NITRITE UR QL STRIP: NEGATIVE
NON-SQ EPI CELLS URNS QL MICRO: ABNORMAL /HPF
NON-SQ EPI CELLS URNS QL MICRO: ABNORMAL /HPF
NON-SQ EPI CELLS URNS QL MICRO: NORMAL /HPF
NON-SQ EPI CELLS URNS QL MICRO: NORMAL /HPF
NRBC BLD AUTO-RTO: 0 /100 WBCS
PH UR STRIP.AUTO: 8 [PH]
PLATELET # BLD AUTO: 266 THOUSANDS/UL (ref 149–390)
PMV BLD AUTO: 10 FL (ref 8.9–12.7)
POTASSIUM SERPL-SCNC: 3.6 MMOL/L (ref 3.5–5.3)
PROT UR STRIP-MCNC: ABNORMAL MG/DL
PROT UR STRIP-MCNC: ABNORMAL MG/DL
PROT UR STRIP-MCNC: NEGATIVE MG/DL
PROT UR STRIP-MCNC: NEGATIVE MG/DL
RBC # BLD AUTO: 2.78 MILLION/UL (ref 3.88–5.62)
RBC #/AREA URNS AUTO: ABNORMAL /HPF
RBC #/AREA URNS AUTO: ABNORMAL /HPF
RBC #/AREA URNS AUTO: NORMAL /HPF
RBC #/AREA URNS AUTO: NORMAL /HPF
SODIUM SERPL-SCNC: 142 MMOL/L (ref 135–147)
SP GR UR STRIP.AUTO: 1 (ref 1–1.03)
SP GR UR STRIP.AUTO: 1.01 (ref 1–1.03)
SP GR UR STRIP.AUTO: 1.01 (ref 1–1.03)
SP GR UR STRIP.AUTO: 1.02 (ref 1–1.03)
UROBILINOGEN UR STRIP-ACNC: <2 MG/DL
WBC # BLD AUTO: 6.31 THOUSAND/UL (ref 4.31–10.16)
WBC #/AREA URNS AUTO: ABNORMAL /HPF
WBC #/AREA URNS AUTO: ABNORMAL /HPF
WBC #/AREA URNS AUTO: NORMAL /HPF
WBC #/AREA URNS AUTO: NORMAL /HPF

## 2025-06-17 PROCEDURE — 99232 SBSQ HOSP IP/OBS MODERATE 35: CPT | Performed by: INTERNAL MEDICINE

## 2025-06-17 PROCEDURE — 85025 COMPLETE CBC W/AUTO DIFF WBC: CPT | Performed by: INTERNAL MEDICINE

## 2025-06-17 PROCEDURE — 81001 URINALYSIS AUTO W/SCOPE: CPT | Performed by: PHYSICIAN ASSISTANT

## 2025-06-17 PROCEDURE — 83735 ASSAY OF MAGNESIUM: CPT | Performed by: INTERNAL MEDICINE

## 2025-06-17 PROCEDURE — 80048 BASIC METABOLIC PNL TOTAL CA: CPT | Performed by: INTERNAL MEDICINE

## 2025-06-17 PROCEDURE — 82948 REAGENT STRIP/BLOOD GLUCOSE: CPT

## 2025-06-17 RX ORDER — SODIUM CHLORIDE 9 MG/ML
20 INJECTION, SOLUTION INTRAVENOUS ONCE AS NEEDED
Status: DISCONTINUED | OUTPATIENT
Start: 2025-06-17 | End: 2025-06-23 | Stop reason: HOSPADM

## 2025-06-17 RX ORDER — MAGNESIUM SULFATE HEPTAHYDRATE 40 MG/ML
2 INJECTION, SOLUTION INTRAVENOUS ONCE
Status: COMPLETED | OUTPATIENT
Start: 2025-06-17 | End: 2025-06-17

## 2025-06-17 RX ADMIN — METHOTREXATE 6000 MG: 25 INJECTION, SOLUTION INTRA-ARTERIAL; INTRAMUSCULAR; INTRATHECAL; INTRAVENOUS at 13:54

## 2025-06-17 RX ADMIN — SODIUM BICARBONATE 100 ML/HR: 84 INJECTION, SOLUTION INTRAVENOUS at 11:59

## 2025-06-17 RX ADMIN — ENOXAPARIN SODIUM 40 MG: 40 INJECTION SUBCUTANEOUS at 07:57

## 2025-06-17 RX ADMIN — DEXAMETHASONE SODIUM PHOSPHATE: 10 INJECTION, SOLUTION INTRAMUSCULAR; INTRAVENOUS at 11:52

## 2025-06-17 RX ADMIN — QUETIAPINE FUMARATE 12.5 MG: 25 TABLET ORAL at 11:52

## 2025-06-17 RX ADMIN — CIPROFLOXACIN 500 MG: 500 TABLET ORAL at 22:14

## 2025-06-17 RX ADMIN — CIPROFLOXACIN 500 MG: 500 TABLET ORAL at 07:57

## 2025-06-17 RX ADMIN — INSULIN LISPRO 3 UNITS: 100 INJECTION, SOLUTION INTRAVENOUS; SUBCUTANEOUS at 16:04

## 2025-06-17 RX ADMIN — QUETIAPINE FUMARATE 12.5 MG: 25 TABLET ORAL at 22:14

## 2025-06-17 RX ADMIN — SENNOSIDES 17.2 MG: 8.6 TABLET, FILM COATED ORAL at 11:52

## 2025-06-17 RX ADMIN — ATORVASTATIN CALCIUM 20 MG: 20 TABLET, FILM COATED ORAL at 07:57

## 2025-06-17 RX ADMIN — DOCUSATE SODIUM 100 MG: 100 CAPSULE, LIQUID FILLED ORAL at 16:03

## 2025-06-17 RX ADMIN — MAGNESIUM SULFATE HEPTAHYDRATE 2 G: 40 INJECTION, SOLUTION INTRAVENOUS at 09:57

## 2025-06-17 RX ADMIN — SODIUM CHLORIDE 20 ML/HR: 0.9 INJECTION, SOLUTION INTRAVENOUS at 11:52

## 2025-06-17 RX ADMIN — INSULIN LISPRO 5 UNITS: 100 INJECTION, SOLUTION INTRAVENOUS; SUBCUTANEOUS at 22:16

## 2025-06-17 RX ADMIN — ALLOPURINOL 300 MG: 300 TABLET ORAL at 07:57

## 2025-06-17 NOTE — NURSING NOTE
Chemotherapy infused without complication.  Picc flushes easily and gives a brisk blood return.  Pt offers no complaints/concerns at this time.

## 2025-06-17 NOTE — UTILIZATION REVIEW
NOTIFICATION OF INPATIENT ADMISSION   AUTHORIZATION REQUEST   SERVICING FACILITY:   [facility]  Address: [address] [place] [address]  Tax ID: [id number]  NPI: [id number] ATTENDING PROVIDER:  Attending Name and NPI#: Raul Langley Md [[id number]]  Address: [address] [place] [address]  Phone: [phone]   ADMISSION INFORMATION:  Place of Service: Inpatient [facility]  Place of Service Code: 21  Inpatient Admission Date/Time: 6/16/25  8:08 AM  Discharge Date/Time: No discharge date for patient encounter.  Admitting Diagnosis Code/Description:  CNS lymphoma [C83.390]     UTILIZATION REVIEW CONTACT:  Renan Givens Utilization   Network Utilization Review Department  Phone: [phone]  Fax: [phone]  Email: [email or web address]  Contact for approvals/pending authorizations, clinical reviews, and discharge.     PHYSICIAN ADVISORY SERVICES:  Medical Necessity Denial & [employer] Review  Phone: [phone]  Fax: [phone]  Email: [email or web address]     DISCHARGE SUPPORT TEAM:  For Patients Discharge Needs & Updates  Phone: [phone] opt. 2 Fax: [phone]  Email: [email or web address]

## 2025-06-17 NOTE — ASSESSMENT & PLAN NOTE
Controlled with last hemoglobin A1c in February 6 0.6%  Hold home oral regimen  Monitor on sliding scale  Consistent carb diet  Hypoglycemia protocol

## 2025-06-17 NOTE — PLAN OF CARE
Problem: PAIN - ADULT  Goal: Verbalizes/displays adequate comfort level or baseline comfort level  Description: Interventions:  - Encourage patient to monitor pain and request assistance  - Assess pain using appropriate pain scale  - Administer analgesics as ordered based on type and severity of pain and evaluate response  - Implement non-pharmacological measures as appropriate and evaluate response  - Consider cultural and social influences on pain and pain management  - Notify physician/advanced practitioner if interventions unsuccessful or patient reports new pain  - Educate patient/family on pain management process including their role and importance of  reporting pain   - Provide non-pharmacologic/complimentary pain relief interventions  Outcome: Progressing     Problem: INFECTION - ADULT  Goal: Absence or prevention of progression during hospitalization  Description: INTERVENTIONS:  - Assess and monitor for signs and symptoms of infection  - Monitor lab/diagnostic results  - Monitor all insertion sites, i.e. indwelling lines, tubes, and drains  - Monitor endotracheal if appropriate and nasal secretions for changes in amount and color  - [place] appropriate cooling/warming therapies per order  - Administer medications as ordered  - Instruct and encourage patient and family to use good hand hygiene technique  - Identify and instruct in appropriate isolation precautions for identified infection/condition  Outcome: Progressing

## 2025-06-17 NOTE — UTILIZATION REVIEW
Initial Clinical Review    Admission: Date/Time/Statement:   Admission Orders (From admission, onward)       Ordered        06/16/25 0917  Inpatient Admission  Once                          Orders Placed This Encounter   Procedures    Inpatient Admission     Standing Status:   Standing     Number of Occurrences:   1     Level of Care:   Level 2 Stepdown / HOT [14]     Estimated length of stay:   More than 2 Midnights     Certification:   I certify that inpatient services are medically necessary for this patient for a duration of greater than two midnights. See H&P and MD Progress Notes for additional information about the patient's course of treatment.     Initial Presentation: 64 y.o. male with PMHx of HTN, HLD, T2 DM, recently dx'd with CNS lymphoma to hospital as direct Inpatient Admission to [facility] unit for INPT Chemotherapy.  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 He was started on treatment with methotrexate and rituximab, though the treatment course was complicated by E. coli bacteremia. He presents today to the hospital for planned IP chemotherapy. On presentation pt with no complaints. VSS.   Plan: IP admit to Lvl 2 Stepdown Unit. Obtain CBC with diff, CMP, mag. Repeat hgbA1c. Hold home oral dm regimen. Accu-checks / sliding scale insulin. Cons carb diet. Continue PTA po meds including cipro for UTI dx'd as op. Collect ucx. Heme/Onc consulted. SCDs.   Anticipated Length of Stay/Certification Statement: Patient will be admitted on an inpatient basis with an anticipated length of stay of greater than 2 midnights secondary to inpatient chemotherapy.     Heme/Onc consult -- Plan for CNS Lymphoma HD MTX days 1 and 15 ; Rituxan days 3 and 17 and temodar 150 mg/m2 days 7-11 every 28 days-this will be C4D1 and C4 D 15   C4 is scheduled for today 06/04/25: 3 g/m² ; planned today 6/16/25   PLAN:   - urine alkalization per protocol ; IV Na Bicarb, Q6H UA   - daily CBC and CMP   - start MTX when PH  >= 7.4   - MTX level 24H post IV start time   - Q6H Leucovorin 24 hours post MTX   - Outpatient MRI Brain is scheduled 6/26/25       Date: 6/17   Day 2: no events overnight, is doing well, denies any symptoms today, sitting in recliner, no acute distress, labs reviewed, pH >7, cleared to start methotrexate as planned.   Continue per initial protocol.    Scheduled Medications:  Medications 06/16 06/17   allopurinol (ZYLOPRIM) tablet 300 mg  Dose: 300 mg  Freq: Daily Route: PO  Start: 06/16/25 0930    1210      0757        atorvastatin (LIPITOR) tablet 20 mg  Dose: 20 mg  Freq: Daily Route: PO  Start: 06/16/25 0930    1210      0757        ciprofloxacin (CIPRO) tablet 500 mg  Dose: 500 mg  Freq: Every 12 hours scheduled Route: PO  Start: 06/16/25 0930 End: 06/22/25 0859   Admin Instructions:      Order specific questions:       1210 2004      0757 2100        docusate sodium (COLACE) capsule 100 mg  Dose: 100 mg  Freq: 2 times daily Route: PO  Start: 06/16/25 0930    1210     1709      0900     1800        enoxaparin (LOVENOX) subcutaneous injection 40 mg  Dose: 40 mg  Freq: Daily Route: SC  Start: 06/16/25 0930   Admin Instructions:       1210      0757        insulin lispro (HumALOG/ADMELOG) 100 units/mL subcutaneous injection 1-5 Units  Dose: 1-5 Units  Freq: Daily at bedtime Route: SC  Start: 06/16/25 2200   Admin Instructions:       2201      2200         insulin lispro (HumALOG/ADMELOG) 100 units/mL subcutaneous injection 1-5 Units  Dose: 1-5 Units  Freq: 3 times daily before meals Route: SC  Start: 06/16/25 1130   Admin Instructions:       1225 [id number] ([id number]) [phone]        magnesium sulfate 2 g/50 mL IVPB (premix) 2 g  Dose: 2 g  Freq: Once Route: IV  Last Dose: 2 g (06/17/25 0957)  Start: 06/17/25 0845   Admin Instructions:        0957        methotrexate (PF) 6,000 mg in sodium chloride 0.9 % 1,000 mL IVPB  Dose: 6,000 mg  Freq: Once Route: IV  Start: 06/17/25 1430   Admin  Instructions:        1430        ondansetron (ZOFRAN) 16 mg, dexamethasone (DECADRON) 10 mg in sodium chloride 0.9 % 50 mL IVPB  Freq: Once Route: IV  Start: 06/17/25 1030   Admin Instructions:        1030        pantoprazole (PROTONIX) EC tablet 40 mg  Dose: 40 mg  Freq: Daily (early morning) Route: PO  Start: 06/16/25 0930   Admin Instructions:       1210      (0618)        QUEtiapine (SEROquel) tablet 12.5 mg  Dose: 12.5 mg  Freq: 2 times daily Route: PO  Indications of Use: AGITATION,DELIRIUM  Start: 06/16/25 1200   Admin Instructions:       1210     2004 1200 2000        senna (SENOKOT) tablet 17.2 mg  Dose: 17.2 mg  Freq: Daily with lunch Route: PO  Start: 06/16/25 1200    1210      1200          allopurinol, 300 mg, Oral, Daily  atorvastatin, 20 mg, Oral, Daily  ciprofloxacin, 500 mg, Oral, Q12H CAIT  docusate sodium, 100 mg, Oral, BID  enoxaparin, 40 mg, Subcutaneous, Daily  insulin lispro, 1-5 Units, Subcutaneous, TID AC  insulin lispro, 1-5 Units, Subcutaneous, HS  magnesium sulfate, 2 g, Intravenous, Once  pantoprazole, 40 mg, Oral, Early Morning  QUEtiapine, 12.5 mg, Oral, BID  senna, 17.2 mg, Oral, Daily With Lunch    Continuous IV Infusions:  sodium bicarbonate 100 mEq in dextrose 5 % 1,000 mL infusion, 100 mL/hr, Intravenous, Continuous    PRN Meds:  acetaminophen, 650 mg, Oral, Q6H PRN  ondansetron, 4 mg, Intravenous, Q6H PRN      Weight (last 2 days)       None            Vital Signs (last 3 days)       Date/Time Temp Pulse Resp BP MAP (mmHg) SpO2 O2 Device [place] Coma Scale Score Pain    06/17/25 0734 -- -- -- -- -- -- -- 15 No Pain    06/17/25 07:23:51 97.8 °F (36.6 °C) 80 15 134/83 100 97 % -- -- --    06/17/25 0300 -- -- -- -- -- 97 % None (Room air) 15 No Pain    06/17/25 02:29:37 97.9 °F (36.6 °C) 72 -- 127/68 88 99 % -- -- --    06/16/25 21:32:59 97.5 °F (36.4 °C) 69 16 99/56 70 99 % -- -- --    06/16/25 2000 -- -- -- -- -- 97 % None (Room air) 15 No Pain    06/16/25 19:24:47 98.2 °F  (36.8 °C) 73 16 131/76 94 99 % -- -- --    06/16/25 1515 -- -- -- -- -- -- None (Room air) 15 No Pain    06/16/25 10:43:01 97.5 °F (36.4 °C) 78 16 132/78 96 99 % -- -- --    06/16/25 0915 -- -- -- -- -- -- None (Room air) 15 No Pain            Pertinent Labs/Diagnostic Test Results:     Results from last 7 days   Lab Units 06/17/25 0444 06/16/25  1127 06/12/25  0759   WBC Thousand/uL 6.31 7.86 10.15   HEMOGLOBIN g/dL 8.6* 9.6* 10.3*   HEMATOCRIT % 26.1* 30.2* 32.3*   PLATELETS Thousands/uL 266 341 462*   TOTAL NEUT ABS Thousands/µL 3.54 5.37 6.81       Results from last 7 days   Lab Units 06/17/25 0444 06/16/25  1127 06/12/25  0759   SODIUM mmol/L 142 137 141   POTASSIUM mmol/L 3.6 4.0 4.3   CHLORIDE mmol/L 101 102 104   CO2 mmol/L 34* 30 30   ANION GAP mmol/L 7 5 7   BUN mg/dL 15 19 18   CREATININE mg/dL 0.97 1.01 1.14   EGFR ml/min/1.73sq m 81 77 67   CALCIUM mg/dL 8.5 9.0 9.4   MAGNESIUM mg/dL 1.7* 1.7*  --      Results from last 7 days   Lab Units 06/16/25  1127 06/12/25  0759   AST U/L 12* 13   ALT U/L 10 11   ALK PHOS U/L 77 72   TOTAL PROTEIN g/dL 6.3* 6.8   ALBUMIN g/dL 4.0 4.1   TOTAL BILIRUBIN mg/dL 0.30 0.52     Results from last 7 days   Lab Units 06/17/25  0748 06/16/25 2023 06/16/25  1648 06/16/25  1215 06/13/25  0616   POC GLUCOSE mg/dl 145* 175* 160* 212* 115     Results from last 7 days   Lab Units 06/17/25 0444 06/16/25  1127   GLUCOSE RANDOM mg/dL 161* 203*     Results from last 7 days   Lab Units 06/16/25  1127   HEMOGLOBIN A1C % 5.5   EAG mg/dl 111       Results from last 7 days   Lab Units 06/17/25  0444 06/16/25  2203 06/16/25  1429   CLARITY UA  Clear Clear Clear   COLOR UA  Colorless Colorless Light Yellow   SPEC GRAV UA  1.005 1.009 1.020   PH UA  8.0 7.0 5.5   GLUCOSE UA mg/dl Negative Negative Trace*   KETONES UA mg/dl Negative Negative Negative   BLOOD UA  Negative Negative Negative   PROTEIN UA mg/dl Negative Negative Trace*   NITRITE UA  Negative Negative Negative   BILIRUBIN UA   Negative Negative Negative   UROBILINOGEN UA (BE) mg/dl <2.0 <2.0 <2.0   LEUKOCYTES UA  Negative Negative Negative   WBC UA /hpf None Seen 1-2 1-2   RBC UA /hpf None Seen None Seen 1-2   BACTERIA UA /hpf None Seen Occasional None Seen   EPITHELIAL CELLS WET PREP /hpf None Seen None Seen None Seen             Past Medical History[1]  Present on Admission:   Type 2 diabetes mellitus with hyperglycemia, without long-term current use of insulin (HCC)   HTN, goal below 130/80   Mixed hyperlipidemia   Primary central nervous system (CNS) lymphoma      Admitting Diagnosis: CNS lymphoma [C83.390]  Age/Sex: 64 y.o. male    Network Utilization Review Department  ATTENTION: Please call with any questions or concerns to [phone] and carefully listen to the prompts so that you are directed to the right person. All voicemails are confidential.   For Discharge needs, contact Care Management DC Support Team at [phone] opt. 2  Send all requests for admission clinical reviews, approved or denied determinations and any other requests to dedicated fax number below belonging to the campus where the patient is receiving treatment. List of dedicated fax numbers for the Facilities:  FACILITY NAME UR FAX NUMBER   ADMISSION DENIALS (Administrative/Medical Necessity) [phone]   DISCHARGE SUPPORT TEAM (NETWORK) [phone]   PARENT CHILD HEALTH (Maternity/NICU/Pediatrics) [phone]   [facility] [phone]   [facility] [phone]   [facility] [phone]   [facility] [phone]   Counts include 234 beds at the [facility] [phone]   [facility] [phone]   [facility] [phone]   [facility] [phone]   [facility] [phone]   [facility] [phone]   [facility]  [facility] [phone]   [facility] [phone]              [1]   Past Medical History:  Diagnosis Date    Colon polyp     Diabetes mellitus (HCC)     GERD (gastroesophageal reflux disease)     Hyperlipidemia     Hypertension     Liver disease     Sleep apnea

## 2025-06-17 NOTE — ASSESSMENT & PLAN NOTE
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 coli bacteremia, LETY, and transaminitis.    Presents for inpatient chemotherapy with methotrexate and rituximab  Management per hematology/oncology service

## 2025-06-17 NOTE — PROGRESS NOTES
Medical Oncology/Hematology Progress Note  Alexis Dennison, male, 64 y.o., 1960,  PPHP 916/[facility] 916-01, [id number]     Assessment and Plan  1. Primary central nervous system (CNS) lymphoma,  double expresser   DLBCL NOS, non-germinal center, Ki-67 60-70, BCL6 rearrangement)   Diagnosed in 4/ 2025  Established with Dr. Phan      On Q-14 days High-dose IV Methotrexate   C1 (4/18) 8 g/m²  C2 (5/2) 3 g/m², dose-reduced due to LETY, delayed x1 day due to E. coli bacteremia   C3 (5/21) 3 g/m²   C4 : 3 g/m² ; will be given on 6/17/25      S/p Rituximab x 5 doses 4/17/25 - 5/23/25 ; next Rituxan as planned on day 3 likely in patient  Patient had prescription for temozolomide total to 90 mg a day on days 7 through 11     PLAN   - urine alkalization per protocol ; IV Na Bicarb, Q6H UA   - daily CBC and CMP   - start MTX when PH >= 7.4 ; 6/17/25 Urine PH 8.0 ; vitals, labs, ROS and exam obtained, all unremarkable, okay to start Methotrexate infusion today as planned.   - MTX level 24H post IV start time   - Q6H Leucovorin 24 hours post MTX   - Outpatient MRI Brain is scheduled 6/26/25     2.  Recent history of UTI   UA 6/12/2025 large leukocytes and positive nitrites  He noted occasional but not all the time dysuria  continue ciprofloxacin  He is afebrile WBC not elevated  urine culture sent and in process;     __________________________________________    Outpatient follow up plan: With Dr. Phan  Communication with patient/family:  I did update the patient   Communication with team:  Did communicate with Dr. Michael, primary team.   I did review this patient with Dr. Pahn and she is in agreement with this plan.              Subjective:  Patient seen and examined this morning, no events overnight, is doing well, denies any symptoms today, sitting in recliner no acute distress, labs reviewed, pH above 7, cleared to start methotrexate as planned.     - GENERAL: Negative for any nausea, vomiting, fevers, chills, or  weight loss.  - HEENT: Negative for any head/Neck trauma, pain, double/blurry vision, sinusitis, rhinitis, nose bleeding.  - CARDIAC: Negative for any chest pain, palpitation, Dyspnea on exertion, peripheral edema.  - PULMONARY: Negative for any SOB, cough, wheezing.   - GASTROINTESTINAL: Negative for any abdominal pain, N/V/D/C, blood in stool.   - GENITOURINARY: Negative for any dysuria, hematuria, incontinence.  - NEUROLOGIC: Negative for any muscle weakness, numbness/tingling, memory changes.    - MUSCULOSKELETAL: Negative for any joint pains/swelling, limited ROM.   - INTEGUMENTARY: Negative for any rashes, cuts/ lesions.  - HEMATOLOGIC: Negative for any abnormal bruising, frequent infections or bleeding.      Objective:     Medication Administration - last 24 hours from 06/16/2025 0830 to 06/17/2025 0830         Date/Time Order Dose Route Action Action by     06/17/2025 0757 EDT enoxaparin (LOVENOX) subcutaneous injection 40 mg 40 mg Subcutaneous Given Ngozi Vale RN     06/16/2025 1210 EDT enoxaparin (LOVENOX) subcutaneous injection 40 mg 40 mg Subcutaneous Given Shikha Bonilla RN     06/17/2025 0757 EDT allopurinol (ZYLOPRIM) tablet 300 mg 300 mg Oral Given Ngozi Vale RN     06/16/2025 1210 EDT allopurinol (ZYLOPRIM) tablet 300 mg 300 mg Oral Given Shikha Bonilla RN     06/17/2025 0757 EDT atorvastatin (LIPITOR) tablet 20 mg 20 mg Oral Given Ngozi Vale RN     06/16/2025 1210 EDT atorvastatin (LIPITOR) tablet 20 mg 20 mg Oral Given Shikha Bonilla RN     06/17/2025 0757 EDT ciprofloxacin (CIPRO) tablet 500 mg 500 mg Oral Given Ngozi Vale RN     06/16/2025 2004 EDT ciprofloxacin (CIPRO) tablet 500 mg 500 mg Oral Given Arsh Roblero RN     06/16/2025 1210 EDT ciprofloxacin (CIPRO) tablet 500 mg 500 mg Oral Given Shikha Bonilla RN     06/17/2025 0900 EDT docusate sodium (COLACE) capsule 100 mg 0 mg Oral Hold Ngozi Vale, ADELINE     06/16/2025 6697 EDT docusate sodium  (COLACE) capsule 100 mg 100 mg Oral Given Shikha Bonilla RN     06/16/2025 1210 EDT docusate sodium (COLACE) capsule 100 mg 100 mg Oral Given Shikha Bonilla RN     06/17/2025 0618 EDT pantoprazole (PROTONIX) EC tablet 40 mg 40 mg Oral Not Given Arsh Roblero RN     06/16/2025 1210 EDT pantoprazole (PROTONIX) EC tablet 40 mg 40 mg Oral Given Shikha Bonilla RN     06/16/2025 2004 EDT QUEtiapine (SEROquel) tablet 12.5 mg 12.5 mg Oral Given Arsh Roblero RN     06/16/2025 1210 EDT QUEtiapine (SEROquel) tablet 12.5 mg 12.5 mg Oral Given Shikha Bonilla RN     06/16/2025 1210 EDT senna (SENOKOT) tablet 17.2 mg 17.2 mg Oral Given Shikha Bonilla RN     06/17/2025 0753 EDT insulin lispro (HumALOG/ADMELOG) 100 units/mL subcutaneous injection 1-5 Units -- Subcutaneous Not Given Ngozi Vale, ADELINE     06/16/2025 1709 EDT insulin lispro (HumALOG/ADMELOG) 100 units/mL subcutaneous injection 1-5 Units 1 Units Subcutaneous Given Shikha Bonilla RN     06/16/2025 1226 EDT insulin lispro (HumALOG/ADMELOG) 100 units/mL subcutaneous injection 1-5 Units 2 Units Subcutaneous Given Shikha Bonilla RN     06/16/2025 2201 EDT insulin lispro (HumALOG/ADMELOG) 100 units/mL subcutaneous injection 1-5 Units 1 Units Subcutaneous Given Arsh Roblero RN     06/16/2025 1848 EDT sodium bicarbonate 100 mEq in dextrose 5 % 1,000 mL infusion 200 mL/hr Intravenous Rate/Dose Verify Shikha Bonilla RN     06/16/2025 1837 EDT sodium bicarbonate 100 mEq in dextrose 5 % 1,000 mL infusion 200 mL/hr Intravenous New Bag Shikha Bonilla RN     06/16/2025 1836 EDT sodium bicarbonate 100 mEq in dextrose 5 % 1,000 mL infusion 0 mL/hr Intravenous Stopped Shikha Bonilla RN     06/16/2025 1709 EDT sodium bicarbonate 100 mEq in dextrose 5 % 1,000 mL infusion 200 mL/hr Intravenous Rate/Dose Change Shikha Bonilla RN     06/16/2025 1230 EDT sodium bicarbonate 100 mEq in dextrose 5 % 1,000 mL infusion 150 mL/hr Intravenous  New Bag Shikha Bonilla RN            /83   Pulse 80   Temp 97.8 °F (36.6 °C)   Resp 15   SpO2 97%       Physical Exam  - GEN: Flat affect noted ; appears well, alert and oriented x 3, pleasant and cooperative, in no acute distress  - HEENT: Anicteric, mucous membranes moist, PERRL and EOMI   - NECK: No lymphadenopathy, JVD or carotid bruits   - HEART: RRR, normal S1 and S2, no murmurs, clicks, gallops or rubs   - LUNGS: Clear to auscultation bilaterally; no wheezes, rales, or rhonchi  - ABDOMEN: Normal bowel sounds, soft, no tenderness, no distention, no organomegaly or masses felt on exam.   - EXTREMITIES: Peripheral pulses normal; no clubbing, cyanosis, or edema  - NEURO: Slow speech/mild memory deficit noted, no focal findings, CN II-XII are grossly intact.   - Musculoskeletal: 5/5 strength, normal ROM, no swollen or erythematous joints.   - SKIN: Normal without suspicious lesions on exposed skin    Recent Results (from the past 48 hours)   Hemoglobin A1c w/EAG Estimation (Prechecked if no A1C within 90 days)    Collection Time: 06/16/25 11:27 AM   Result Value Ref Range    Hemoglobin A1C 5.5 Normal 4.0-5.6%; PreDiabetic 5.7-6.4%; Diabetic >=6.5%; Glycemic control for adults with diabetes <7.0% %     mg/dl   CBC and differential    Collection Time: 06/16/25 11:27 AM   Result Value Ref Range    WBC 7.86 4.31 - 10.16 Thousand/uL    RBC 3.12 (L) 3.88 - 5.62 Million/uL    Hemoglobin 9.6 (L) 12.0 - 17.0 g/dL    Hematocrit 30.2 (L) 36.5 - 49.3 %    MCV 97 82 - 98 fL    MCH 30.8 26.8 - 34.3 pg    MCHC 31.8 31.4 - 37.4 g/dL    RDW 18.1 (H) 11.6 - 15.1 %    MPV 10.6 8.9 - 12.7 fL    Platelets 341 149 - 390 Thousands/uL    nRBC 0 /100 WBCs    Segmented % 69 43 - 75 %    Immature Grans % 1 0 - 2 %    Lymphocytes % 19 14 - 44 %    Monocytes % 7 4 - 12 %    Eosinophils Relative 2 0 - 6 %    Basophils Relative 2 (H) 0 - 1 %    Absolute Neutrophils 5.37 1.85 - 7.62 Thousands/µL    Absolute Immature Grans  0.11 0.00 - 0.20 Thousand/uL    Absolute Lymphocytes 1.50 0.60 - 4.47 Thousands/µL    Absolute Monocytes 0.55 0.17 - 1.22 Thousand/µL    Eosinophils Absolute 0.18 0.00 - 0.61 Thousand/µL    Basophils Absolute 0.15 (H) 0.00 - 0.10 Thousands/µL   Comprehensive metabolic panel    Collection Time: 06/16/25 11:27 AM   Result Value Ref Range    Sodium 137 135 - 147 mmol/L    Potassium 4.0 3.5 - 5.3 mmol/L    Chloride 102 96 - 108 mmol/L    CO2 30 21 - 32 mmol/L    ANION GAP 5 4 - 13 mmol/L    BUN 19 5 - 25 mg/dL    Creatinine 1.01 0.60 - 1.30 mg/dL    Glucose 203 (H) 65 - 140 mg/dL    Calcium 9.0 8.4 - 10.2 mg/dL    AST 12 (L) 13 - 39 U/L    ALT 10 7 - 52 U/L    Alkaline Phosphatase 77 34 - 104 U/L    Total Protein 6.3 (L) 6.4 - 8.4 g/dL    Albumin 4.0 3.5 - 5.0 g/dL    Total Bilirubin 0.30 0.20 - 1.00 mg/dL    eGFR 77 ml/min/1.73sq m   Magnesium    Collection Time: 06/16/25 11:27 AM   Result Value Ref Range    Magnesium 1.7 (L) 1.9 - 2.7 mg/dL   Fingerstick Glucose (POCT)    Collection Time: 06/16/25 12:15 PM   Result Value Ref Range    POC Glucose 212 (H) 65 - 140 mg/dl   UA (URINE) with reflex to Scope    Collection Time: 06/16/25  2:29 PM   Result Value Ref Range    Color, UA Light Yellow     Clarity, UA Clear     Specific Gravity, UA 1.020 1.003 - 1.030    pH, UA 5.5 4.5, 5.0, 5.5, 6.0, 6.5, 7.0, 7.5, 8.0    Leukocytes, UA Negative Negative    Nitrite, UA Negative Negative    Protein, UA Trace (A) Negative mg/dl    Glucose, UA Trace (A) Negative mg/dl    Ketones, UA Negative Negative mg/dl    Urobilinogen, UA <2.0 <2.0 mg/dl mg/dl    Bilirubin, UA Negative Negative    Occult Blood, UA Negative Negative   Urine Microscopic    Collection Time: 06/16/25  2:29 PM   Result Value Ref Range    RBC, UA 1-2 None Seen, 1-2 /hpf    WBC, UA 1-2 None Seen, 1-2 /hpf    Epithelial Cells None Seen None Seen, Occasional /hpf    Bacteria, UA None Seen None Seen, Occasional /hpf   Fingerstick Glucose (POCT)    Collection Time: 06/16/25   4:48 PM   Result Value Ref Range    POC Glucose 160 (H) 65 - 140 mg/dl   Fingerstick Glucose (POCT)    Collection Time: 06/16/25  8:23 PM   Result Value Ref Range    POC Glucose 175 (H) 65 - 140 mg/dl   Urinalysis with microscopic    Collection Time: 06/16/25 10:03 PM   Result Value Ref Range    Color, UA Colorless     Clarity, UA Clear     Specific Gravity, UA 1.009 1.003 - 1.030    pH, UA 7.0 4.5, 5.0, 5.5, 6.0, 6.5, 7.0, 7.5, 8.0    Leukocytes, UA Negative Negative    Nitrite, UA Negative Negative    Protein, UA Negative Negative mg/dl    Glucose, UA Negative Negative mg/dl    Ketones, UA Negative Negative mg/dl    Urobilinogen, UA <2.0 <2.0 mg/dl mg/dl    Bilirubin, UA Negative Negative    Occult Blood, UA Negative Negative    RBC, UA None Seen None Seen, 1-2 /hpf    WBC, UA 1-2 None Seen, 1-2 /hpf    Epithelial Cells None Seen None Seen, Occasional /hpf    Bacteria, UA Occasional None Seen, Occasional /hpf   Urinalysis with microscopic    Collection Time: 06/17/25  4:44 AM   Result Value Ref Range    Color, UA Colorless     Clarity, UA Clear     Specific Gravity, UA 1.005 1.003 - 1.030    pH, UA 8.0 4.5, 5.0, 5.5, 6.0, 6.5, 7.0, 7.5, 8.0    Leukocytes, UA Negative Negative    Nitrite, UA Negative Negative    Protein, UA Negative Negative mg/dl    Glucose, UA Negative Negative mg/dl    Ketones, UA Negative Negative mg/dl    Urobilinogen, UA <2.0 <2.0 mg/dl mg/dl    Bilirubin, UA Negative Negative    Occult Blood, UA Negative Negative    RBC, UA None Seen None Seen, 1-2 /hpf    WBC, UA None Seen None Seen, 1-2 /hpf    Epithelial Cells None Seen None Seen, Occasional /hpf    Bacteria, UA None Seen None Seen, Occasional /hpf   Basic metabolic panel    Collection Time: 06/17/25  4:44 AM   Result Value Ref Range    Sodium 142 135 - 147 mmol/L    Potassium 3.6 3.5 - 5.3 mmol/L    Chloride 101 96 - 108 mmol/L    CO2 34 (H) 21 - 32 mmol/L    ANION GAP 7 4 - 13 mmol/L    BUN 15 5 - 25 mg/dL    Creatinine 0.97 0.60 -  1.30 mg/dL    Glucose 161 (H) 65 - 140 mg/dL    Calcium 8.5 8.4 - 10.2 mg/dL    eGFR 81 ml/min/1.73sq m   Magnesium    Collection Time: 06/17/25  4:44 AM   Result Value Ref Range    Magnesium 1.7 (L) 1.9 - 2.7 mg/dL   CBC and differential    Collection Time: 06/17/25  4:44 AM   Result Value Ref Range    WBC 6.31 4.31 - 10.16 Thousand/uL    RBC 2.78 (L) 3.88 - 5.62 Million/uL    Hemoglobin 8.6 (L) 12.0 - 17.0 g/dL    Hematocrit 26.1 (L) 36.5 - 49.3 %    MCV 94 82 - 98 fL    MCH 30.9 26.8 - 34.3 pg    MCHC 33.0 31.4 - 37.4 g/dL    RDW 17.7 (H) 11.6 - 15.1 %    MPV 10.0 8.9 - 12.7 fL    Platelets 266 149 - 390 Thousands/uL    nRBC 0 /100 WBCs    Segmented % 55 43 - 75 %    Immature Grans % 1 0 - 2 %    Lymphocytes % 29 14 - 44 %    Monocytes % 10 4 - 12 %    Eosinophils Relative 3 0 - 6 %    Basophils Relative 2 (H) 0 - 1 %    Absolute Neutrophils 3.54 1.85 - 7.62 Thousands/µL    Absolute Immature Grans 0.07 0.00 - 0.20 Thousand/uL    Absolute Lymphocytes 1.82 0.60 - 4.47 Thousands/µL    Absolute Monocytes 0.60 0.17 - 1.22 Thousand/µL    Eosinophils Absolute 0.17 0.00 - 0.61 Thousand/µL    Basophils Absolute 0.11 (H) 0.00 - 0.10 Thousands/µL   Fingerstick Glucose (POCT)    Collection Time: 06/17/25  7:48 AM   Result Value Ref Range    POC Glucose 145 (H) 65 - 140 mg/dl       NM PET CT skull base to mid thigh  Result Date: 6/16/2025  Narrative: PET/CT SCAN INDICATION: C83.390: Primary central nervous system lymphoma, restaging MODIFIER: PS COMPARISON: CT of abdomen and pelvis dated 5/18/2025, MRI of the brain, cervical spine, thoracic spine, and lumbar spine dated 4/19/25, CT angiography of the chest, abdomen, and pelvis dated 3/29/2025 CELL TYPE: Involved by pt's large B-cell lymphoma (bx 4/13/25 CSF +) TECHNIQUE:   8.2 mCi F-18-FDG administered IV. Multiplanar attenuation corrected and non attenuation corrected PET images are available for interpretation, and contiguous, low dose, axial CT sections were obtained from  the skull vertex through the femurs. Intravenous contrast material was not utilized. This examination, like all CT scans performed in the [facility], was performed utilizing techniques to minimize radiation dose exposure, including the use of iterative reconstruction and automated exposure control. Fasting serum glucose: 115 mg/dl FINDINGS: VISUALIZED BRAIN: On PET image 37 there is subtle asymmetric focal activity involving the left cerebral hemisphere slightly posterior to the left frontal horn which is difficult to correlate on low-dose unenhanced CT but measures approximately 1.5 cm on PET imaging with max SUV of 7.7; this finding is in the expected location patient's known enhancing intracranial mass which is better characterized on MRI of the brain dated 4/20/25 and could reflect mild hypermetabolic malignant activity versus asymmetric activity involving the left basal ganglia which is in this region. Patient's known intracranial malignancy is better characterized on MRI of the brain given background physiologic brain activity and continued follow-up with MRI of the brain is recommended. HEAD/NECK: On image 20 there is mild FDG activity associated with cutaneous/subcutaneous soft tissue density involving the left frontal scalp which appears superficial to a siomara hole in the left frontal calvarium extending from images 21 through 24 of series 3. On image 20 of series 3 this finding measures approximately 1.3 cm in length with max SUV of 3.9 and possibly reflects inflammatory activity although correlation is recommended to exclude the possibility of cutaneous/subcutaneous malignancy of low metabolic  activity. CT images: Essentially stable prominence to the lateral ventricles which is better characterized on prior dedicated MRI of the brain. Stable asymmetric nodular enlargement of the left thyroid gland which was better characterized on thyroid ultrasound dated 6//25 with recommendation for  tissue sampling was made. On image 93 of series 3 there is a nonspecific mildly prominent common nonpathologic in size left perithyroidal/lower cervical lymph node measuring 6 mm in short axis. CHEST: No FDG avid soft tissue lesions are seen. CT images: Trace gynecomastia. Atherosclerotic vascular calcifications including those of the coronary arteries are noted. Tiny calcified right middle lobe granuloma. On image 129 of series 3 there is a new 6 mm left lower lobe lung nodule, possibly related to adjacent subtle patchy likely inflammatory groundglass airspace disease although given known malignancy, short interval follow-up with CT of chest in 3 months is recommended to ensure stability. ABDOMEN: Fairly diffuse nonspecific nonuniform bowel activity, possibly physiologic which limits evaluation for underlying hypermetabolic bowel malignancy. CT images: Stable right hepatic hypodensity which was better characterized on prior MRI of abdomen where it was characterized as a cyst. Atherosclerotic vascular calcifications are noted. PELVIS: No FDG avid soft tissue lesions are seen. CT images: Enlarged prostate gland. Mild nonspecific diffuse mural thickening of the urinary bladder wall which is difficult to evaluate secondary to under distention. OSSEOUS STRUCTURES: No FDG avid lesions are seen. CT images: No significant findings.     Impression: 1.  Subtle asymmetric focal FDG activity involving the left cerebral hemisphere in the expected location of patient's known enhancing intracranial mass which could reflect mild hypermetabolic malignancy versus asymmetric physiologic activity involving the left basal ganglia. Patient's known intracranial malignancy is better characterized on MRI of the brain and continued follow-up with MRI of the brain is recommended as clinically indicated. 2.  Mild nonspecific FDG activity associated with cutaneous/subcutaneous soft tissue density involving the left frontal scalp which appears  superficial to a siomara hole in the left frontal calvarium, possibly reflecting inflammatory activity. Correlation with direct visualization is recommended to exclude the possibility of cutaneous/subcutaneous malignancy of low metabolic activity. 3.  New 6 mm left lower lobe lung nodule, possibly inflammatory. Short interval follow-up with CT of chest in 3 months is recommended to ensure stability. 4.  Please see above for details and additional findings. The study was marked in EPIC for significant notification. The study was marked in Epic for follow-up. Workstation performed: [id number]     US thyroid  Result Date: 6/11/2025  Narrative: THYROID ULTRASOUND INDICATION: E04.1: Nontoxic single thyroid nodule. COMPARISON: CTA head and neck 3/24/2025 TECHNIQUE: Ultrasound of the thyroid was performed with a high frequency linear transducer in transverse and sagittal planes including volumetric imaging sweeps as well as traditional still imaging technique. FINDINGS: Thyroid texture: Thyroid is mildly inhomogeneous and hypervascular. Right lobe: 5.0 x 1.5 x 1.9 cm. Volume 6.9 mL Left lobe: 6.0 x 3.1 x 2.8 cm. Volume 24.9 mL Isthmus: 0.3 cm. Nodule #1. Image 33. LEFT anterior mid to lower pole nodule. The length in depth is difficult to accurately determine as it is abutting an additional posterior nodule, best estimate measuring 3.1 x 1.4 x 2.1 cm (series 200 image 105 of 200). No prior ultrasound. COMPOSITION: 2 points, solid or almost completely solid. ECHOGENICITY: 1 point, hyperechoic or isoechoic. SHAPE: 0 points, wider-than-tall. MARGIN: 0 points, ill-defined. ECHOGENIC FOCI: 0 points, none or large comet-tail artifacts. TI-RADS Classification: TR 3 (3 points), FNA if >/= 2.5 cm. Follow if >/= 1.5 cm. Nodule #2. Image 36. LEFT posterior midgland nodule measuring 2.3 x 1.5 x 2.2 cm. No prior ultrasound. COMPOSITION: 2 points, solid or almost completely solid. ECHOGENICITY: 1 point, hyperechoic or isoechoic. SHAPE: 0  points, wider-than-tall. MARGIN: 0 points, smooth. ECHOGENIC FOCI: 0 points, none or large comet-tail artifacts. TI-RADS Classification: TR 3 (3 points), FNA if >/= 2.5 cm. Follow if >/= 1.5 cm. There are additional nodules of lesser size and/or TI-RADS score. These do not necessitate additional evaluation based on ACR criteria.     Impression: Asymmetric left thyroid enlargement with associated nodules corresponding to previous CT findings. The following meet current ACR criteria for recommending ultrasound guided biopsy: 1. LEFT anterior mid to lower pole 3.1 cm nodule (Image 33). 2. LEFT posterior midgland nodule measuring 2.3 cm (Image 36). Reference: ACR Thyroid Imaging, Reporting and Data System (TI-RADS): White Paper of the ACR TI-RADS Committee. J AM Winifred Radiol 2017;14:587-595. Additional recommendations based on American Thyroid Association 2015 guidelines. The study was marked in EPIC for significant notification and follow-up. Workstation performed: [id number]     CT abdomen pelvis w contrast  Result Date: 5/19/2025  Narrative: CT ABDOMEN AND PELVIS WITH IV CONTRAST INDICATION: recurrent bacteremia, look for source. Blood culture and urine culture positive for gram-negative rods. COMPARISON: April 30, 2025. TECHNIQUE: CT examination of the abdomen and pelvis was performed. Multiplanar 2D reformatted images were created from the source data. This examination, like all CT scans performed in the [facility], was performed utilizing techniques to minimize radiation dose exposure, including the use of iterative reconstruction and automated exposure control. Radiation dose length product (DLP) for this visit: 390.85 mGy-cm. IV Contrast: 85 mL of iohexol (OMNIPAQUE) Enteric Contrast: Not administered. FINDINGS: ABDOMEN LOWER CHEST: A small calcified granuloma is redemonstrated anteriorly at the right lung base. There is coronary artery disease. A catheter terminates in the right atrium.  LIVER/BILIARY TREE: There is a 13 x 9 mm cyst posteriorly in the right lobe of the liver, similar to the prior study. GALLBLADDER: No calcified gallstones. No pericholecystic inflammatory change. SPLEEN: Unremarkable. PANCREAS: Unremarkable. ADRENAL GLANDS: Unremarkable. KIDNEYS/URETERS: Mild bilateral nonspecific perinephric stranding, increased somewhat compared with April 30, 2025. No hydronephrosis bilaterally. STOMACH AND BOWEL: No bowel obstruction. No pericolonic inflammatory change. APPENDIX: Normal. ABDOMINOPELVIC CAVITY: There is a small amount of free fluid, best demonstrated in the right lower quadrant and paracolic gutters. No pneumoperitoneum. VESSELS: Atherosclerosis. No abdominal aortic aneurysm. PELVIS REPRODUCTIVE ORGANS: Unremarkable for patient's age. URINARY BLADDER: Unremarkable. ABDOMINAL WALL/INGUINAL REGIONS: There is some gas within the subcutaneous fat of the anterior abdominal walls near the level of the umbilicus, left more so than right, possibly related to recent injections. Small fat-containing left inguinal hernia. BONES: No acute fracture or suspicious osseous lesion. Spinal degenerative changes, most notably at L5-S1.     Impression: There is mild bilateral nonspecific perinephric stranding, however, this does appear somewhat increased compared with April 30, 2025. Correlation with urinalysis and urine culture and sensitivity is recommended. There is a small amount of free fluid. Other nonemergent findings as above. Workstation performed: [id number]       I have personally reviewed labs, imaging studies, and pertinent reports.      This note has been generated by voice recognition software system.  Therefore, there may be spelling, grammar, and or syntax errors. Please contact if questions arise.     Simba Phillips, DO   Hematology and Medical Oncology - PGY V  [facility]

## 2025-06-17 NOTE — RESTORATIVE TECHNICIAN NOTE
Restorative Technician Note      Patient Name: Alexis Dennison     Restorative Tech Visit Date: 06/17/25  Note Type: Mobility  Patient Position Upon Consult: Bedside chair  Activity Performed: Ambulated  Assistive Device: Other (Comment) (IV Pole)  Patient Position at End of Consult: Bedside chair; All needs within reach; Bed/Chair alarm activated    Sin Ramirez, Restorative Technician

## 2025-06-17 NOTE — ASSESSMENT & PLAN NOTE
Recently diagnosed as an outpatient and placed on ciprofloxacin  10-day course was planned for 6/12 through 6/22; will continue on cipro as previously prescribed

## 2025-06-17 NOTE — PROGRESS NOTES
Progress Note - Hospitalist   Name: Alexis Dennison 64 y.o. male I MRN: [id number]  [facility] Date of Admission: 6/16/2025   Date of Service: 6/17/2025 I Hospital Day: 1    Assessment & Plan  Primary central nervous system (CNS) lymphoma  recently-diagnosed primary CNS (Dx 4/14/2025, double expressor (BCL2+/MYC+), DLBCL NOS, non-germinal center, Ki-67 60-70, BCL6 rearrangement   treatment course of high-dose MTX + rituximab complicated by sepsis due to recurrent E. coli bacteremia, LETY, and transaminitis.    Presents for inpatient chemotherapy with methotrexate and rituximab  Management per hematology/oncology service  Type 2 diabetes mellitus with hyperglycemia, without long-term current use of insulin (HCC)  Controlled with last hemoglobin A1c in February 6 0.6%  Hold home oral regimen  Monitor on sliding scale  Consistent carb diet  Hypoglycemia protocol    HTN, goal below 130/80  Not on any antihypertensives  Continue to monitor  Mixed hyperlipidemia  Continue statin  UTI (urinary tract infection)  Recently diagnosed as an outpatient and placed on ciprofloxacin  10-day course was planned for 6/12 through 6/22; will continue on cipro as previously prescribed    VTE Pharmacologic Prophylaxis: VTE Score: 4 Moderate Risk (Score 3-4) - Pharmacological DVT Prophylaxis Ordered: heparin.    Mobility:   Basic Mobility Inpatient Raw Score: 20  JH-HLM Goal: 6: Walk 10 steps or more  JH-HLM Achieved: 8: Walk 250 feet ot more  JH-HLM Goal NOT achieved. Continue with multidisciplinary rounding and encourage appropriate mobility to improve upon JH-HLM goals.    Patient Centered Rounds: I performed bedside rounds with nursing staff today.   Discussions with Specialists or Other Care Team Provider: nurse, CM, oncology    Current Length of Stay: 1 day(s)  Current Patient Status: Inpatient   Certification Statement: The patient will continue to require additional inpatient hospital stay due to IP  chemotherapy  Discharge Plan: Anticipate discharge in 24-48 hrs to home.    Code Status: Level 1 - Full Code    Subjective   Denies any new complaints today    Objective :  Temp:  [97.5 °F (36.4 °C)-98.2 °F (36.8 °C)] 98.2 °F (36.8 °C)  HR:  [68-80] 69  BP: ()/(56-83) 132/78  Resp:  [14-17] 17  SpO2:  [97 %-100 %] 100 %  O2 Device: None (Room air)    Body mass index is 25.43 kg/m².     Input and Output Summary (last 24 hours):     Intake/Output Summary (Last 24 hours) at 6/17/2025 1430  Last data filed at 6/17/2025 1156  Gross per 24 hour   Intake 1504.17 ml   Output 1650 ml   Net -145.83 ml       Physical Exam  Constitutional:       Appearance: Normal appearance.   HENT:      Head: Normocephalic and atraumatic.      Nose: Nose normal.     Eyes:      Extraocular Movements: Extraocular movements intact.       Cardiovascular:      Rate and Rhythm: Normal rate and regular rhythm.   Pulmonary:      Effort: Pulmonary effort is normal.      Breath sounds: No wheezing or rales.   Abdominal:      General: There is no distension.      Palpations: Abdomen is soft.      Tenderness: There is no abdominal tenderness.     Musculoskeletal:      Right lower leg: No edema.      Left lower leg: No edema.     Neurological:      Mental Status: He is alert and oriented to person, place, and time.     Psychiatric:         Mood and Affect: Mood normal.         Behavior: Behavior normal.           Lines/Drains:  Lines/Drains/Airways       Active Status       Name Placement date Placement time Site Days    PICC Line 06/16/25 Left Brachial 06/16/25  1652  Brachial  less than 1                    Central Line:  Goal for removal: Port accessed. Will de-access as appropriate.               Lab Results: I have reviewed the following results:   Results from last 7 days   Lab Units 06/17/25  0444   WBC Thousand/uL 6.31   HEMOGLOBIN g/dL 8.6*   HEMATOCRIT % 26.1*   PLATELETS Thousands/uL 266   SEGS PCT % 55   LYMPHO PCT % 29   MONO PCT % 10    EOS PCT % 3     Results from last 7 days   Lab Units 06/17/25  0444 06/16/25  1127   SODIUM mmol/L 142 137   POTASSIUM mmol/L 3.6 4.0   CHLORIDE mmol/L 101 102   CO2 mmol/L 34* 30   BUN mg/dL 15 19   CREATININE mg/dL 0.97 1.01   ANION GAP mmol/L 7 5   CALCIUM mg/dL 8.5 9.0   ALBUMIN g/dL  --  4.0   TOTAL BILIRUBIN mg/dL  --  0.30   ALK PHOS U/L  --  77   ALT U/L  --  10   AST U/L  --  12*   GLUCOSE RANDOM mg/dL 161* 203*         Results from last 7 days   Lab Units 06/17/25  1154 06/17/25  0748 06/16/25  2023 06/16/25  1648 06/16/25  1215 06/13/25  0616   POC GLUCOSE mg/dl 137 145* 175* 160* 212* 115     Results from last 7 days   Lab Units 06/16/25  1127   HEMOGLOBIN A1C % 5.5           Recent Cultures (last 7 days):         Imaging Results Review: No pertinent imaging studies reviewed.  Other Study Results Review: No additional pertinent studies reviewed.    Last 24 Hours Medication List:     Current Facility-Administered Medications:     acetaminophen (TYLENOL) tablet 650 mg, Q6H PRN    allopurinol (ZYLOPRIM) tablet 300 mg, Daily    atorvastatin (LIPITOR) tablet 20 mg, Daily    ciprofloxacin (CIPRO) tablet 500 mg, Q12H CAIT    docusate sodium (COLACE) capsule 100 mg, BID    enoxaparin (LOVENOX) subcutaneous injection 40 mg, Daily    insulin lispro (HumALOG/ADMELOG) 100 units/mL subcutaneous injection 1-5 Units, TID AC **AND** Fingerstick Glucose (POCT), TID AC    insulin lispro (HumALOG/ADMELOG) 100 units/mL subcutaneous injection 1-5 Units, HS    methotrexate (PF) 6,000 mg in sodium chloride 0.9 % 1,000 mL IVPB, Once, Last Rate: 6,000 mg (06/17/25 1354)    ondansetron (ZOFRAN) injection 4 mg, Q6H PRN    pantoprazole (PROTONIX) EC tablet 40 mg, Early Morning    QUEtiapine (SEROquel) tablet 12.5 mg, BID    senna (SENOKOT) tablet 17.2 mg, Daily With Lunch    sodium bicarbonate 100 mEq in dextrose 5 % 1,000 mL infusion, Continuous, Last Rate: 100 mL/hr (06/17/25 4750)    sodium chloride 0.9 % infusion, Once PRN,  Last Rate: 20 mL/hr (06/17/25 1152)    Administrative Statements   Today, Patient Was Seen By: Raul Langley MD  I have spent a total time of 40 minutes in caring for this patient on the day of the visit/encounter including Diagnostic results, Instructions for management, Patient and family education, Impressions, Counseling / Coordination of care, Documenting in the medical record, Reviewing/placing orders in the medical record (including tests, medications, and/or procedures), Obtaining or reviewing history  , and Communicating with other healthcare professionals .    **Please Note: This note may have been constructed using a voice recognition system.**

## 2025-06-18 PROBLEM — K04.7 DENTAL INFECTION: Status: ACTIVE | Noted: 2025-06-18

## 2025-06-18 LAB
ANION GAP SERPL CALCULATED.3IONS-SCNC: 7 MMOL/L (ref 4–13)
BACTERIA UR QL AUTO: ABNORMAL /HPF
BACTERIA UR QL AUTO: NORMAL /HPF
BILIRUB UR QL STRIP: NEGATIVE
BUN SERPL-MCNC: 15 MG/DL (ref 5–25)
CALCIUM SERPL-MCNC: 8.6 MG/DL (ref 8.4–10.2)
CHLORIDE SERPL-SCNC: 102 MMOL/L (ref 96–108)
CLARITY UR: CLEAR
CO2 SERPL-SCNC: 31 MMOL/L (ref 21–32)
COLOR UR: ABNORMAL
COLOR UR: COLORLESS
CREAT SERPL-MCNC: 1.04 MG/DL (ref 0.6–1.3)
ERYTHROCYTE [DISTWIDTH] IN BLOOD BY AUTOMATED COUNT: 17.2 % (ref 11.6–15.1)
GFR SERPL CREATININE-BSD FRML MDRD: 74 ML/MIN/1.73SQ M
GLUCOSE SERPL-MCNC: 119 MG/DL (ref 65–140)
GLUCOSE SERPL-MCNC: 126 MG/DL (ref 65–140)
GLUCOSE SERPL-MCNC: 130 MG/DL (ref 65–140)
GLUCOSE SERPL-MCNC: 249 MG/DL (ref 65–140)
GLUCOSE SERPL-MCNC: 277 MG/DL (ref 65–140)
GLUCOSE SERPL-MCNC: 356 MG/DL (ref 65–140)
GLUCOSE UR STRIP-MCNC: ABNORMAL MG/DL
GLUCOSE UR STRIP-MCNC: NEGATIVE MG/DL
HCT VFR BLD AUTO: 25.6 % (ref 36.5–49.3)
HGB BLD-MCNC: 8.8 G/DL (ref 12–17)
HGB UR QL STRIP.AUTO: NEGATIVE
KETONES UR STRIP-MCNC: NEGATIVE MG/DL
LEUKOCYTE ESTERASE UR QL STRIP: ABNORMAL
LEUKOCYTE ESTERASE UR QL STRIP: NEGATIVE
MAGNESIUM SERPL-MCNC: 2.2 MG/DL (ref 1.9–2.7)
MCH RBC QN AUTO: 31.4 PG (ref 26.8–34.3)
MCHC RBC AUTO-ENTMCNC: 34.4 G/DL (ref 31.4–37.4)
MCV RBC AUTO: 91 FL (ref 82–98)
NITRITE UR QL STRIP: NEGATIVE
NON-SQ EPI CELLS URNS QL MICRO: ABNORMAL /HPF
NON-SQ EPI CELLS URNS QL MICRO: NORMAL /HPF
PH UR STRIP.AUTO: 7.5 [PH]
PH UR STRIP.AUTO: 8 [PH]
PHOSPHATE SERPL-MCNC: 2.7 MG/DL (ref 2.3–4.1)
PLATELET # BLD AUTO: 302 THOUSANDS/UL (ref 149–390)
PMV BLD AUTO: 10.4 FL (ref 8.9–12.7)
POTASSIUM SERPL-SCNC: 3.9 MMOL/L (ref 3.5–5.3)
PROT UR STRIP-MCNC: ABNORMAL MG/DL
PROT UR STRIP-MCNC: NEGATIVE MG/DL
RBC # BLD AUTO: 2.8 MILLION/UL (ref 3.88–5.62)
RBC #/AREA URNS AUTO: ABNORMAL /HPF
RBC #/AREA URNS AUTO: NORMAL /HPF
SODIUM SERPL-SCNC: 140 MMOL/L (ref 135–147)
SP GR UR STRIP.AUTO: 1 (ref 1–1.03)
SP GR UR STRIP.AUTO: 1.01 (ref 1–1.03)
UROBILINOGEN UR STRIP-ACNC: <2 MG/DL
WBC # BLD AUTO: 15.64 THOUSAND/UL (ref 4.31–10.16)
WBC #/AREA URNS AUTO: ABNORMAL /HPF
WBC #/AREA URNS AUTO: NORMAL /HPF

## 2025-06-18 PROCEDURE — 85027 COMPLETE CBC AUTOMATED: CPT | Performed by: INTERNAL MEDICINE

## 2025-06-18 PROCEDURE — 81001 URINALYSIS AUTO W/SCOPE: CPT | Performed by: PHYSICIAN ASSISTANT

## 2025-06-18 PROCEDURE — 82948 REAGENT STRIP/BLOOD GLUCOSE: CPT

## 2025-06-18 PROCEDURE — 84100 ASSAY OF PHOSPHORUS: CPT | Performed by: INTERNAL MEDICINE

## 2025-06-18 PROCEDURE — 80204 DRUG ASSAY METHOTREXATE: CPT | Performed by: INTERNAL MEDICINE

## 2025-06-18 PROCEDURE — 99232 SBSQ HOSP IP/OBS MODERATE 35: CPT | Performed by: INTERNAL MEDICINE

## 2025-06-18 PROCEDURE — 80048 BASIC METABOLIC PNL TOTAL CA: CPT | Performed by: INTERNAL MEDICINE

## 2025-06-18 PROCEDURE — 99233 SBSQ HOSP IP/OBS HIGH 50: CPT | Performed by: INTERNAL MEDICINE

## 2025-06-18 PROCEDURE — 83735 ASSAY OF MAGNESIUM: CPT | Performed by: INTERNAL MEDICINE

## 2025-06-18 RX ORDER — INSULIN LISPRO 100 [IU]/ML
1-6 INJECTION, SOLUTION INTRAVENOUS; SUBCUTANEOUS
Status: DISCONTINUED | OUTPATIENT
Start: 2025-06-18 | End: 2025-06-23 | Stop reason: HOSPADM

## 2025-06-18 RX ORDER — INSULIN LISPRO 100 [IU]/ML
8 INJECTION, SOLUTION INTRAVENOUS; SUBCUTANEOUS ONCE
Status: COMPLETED | OUTPATIENT
Start: 2025-06-18 | End: 2025-06-18

## 2025-06-18 RX ORDER — INSULIN LISPRO 100 [IU]/ML
1-6 INJECTION, SOLUTION INTRAVENOUS; SUBCUTANEOUS
Status: DISCONTINUED | OUTPATIENT
Start: 2025-06-18 | End: 2025-06-18

## 2025-06-18 RX ORDER — INSULIN GLARGINE 100 [IU]/ML
10 INJECTION, SOLUTION SUBCUTANEOUS EVERY MORNING
Status: DISCONTINUED | OUTPATIENT
Start: 2025-06-18 | End: 2025-06-23 | Stop reason: HOSPADM

## 2025-06-18 RX ADMIN — INSULIN LISPRO 4 UNITS: 100 INJECTION, SOLUTION INTRAVENOUS; SUBCUTANEOUS at 21:51

## 2025-06-18 RX ADMIN — INSULIN GLARGINE 10 UNITS: 100 INJECTION, SOLUTION SUBCUTANEOUS at 08:48

## 2025-06-18 RX ADMIN — SODIUM BICARBONATE 100 ML/HR: 84 INJECTION, SOLUTION INTRAVENOUS at 09:02

## 2025-06-18 RX ADMIN — QUETIAPINE FUMARATE 12.5 MG: 25 TABLET ORAL at 20:16

## 2025-06-18 RX ADMIN — INSULIN LISPRO 3 UNITS: 100 INJECTION, SOLUTION INTRAVENOUS; SUBCUTANEOUS at 17:33

## 2025-06-18 RX ADMIN — SENNOSIDES 17.2 MG: 8.6 TABLET, FILM COATED ORAL at 11:41

## 2025-06-18 RX ADMIN — INSULIN LISPRO 8 UNITS: 100 INJECTION, SOLUTION INTRAVENOUS; SUBCUTANEOUS at 00:41

## 2025-06-18 RX ADMIN — DOCUSATE SODIUM 100 MG: 100 CAPSULE, LIQUID FILLED ORAL at 08:48

## 2025-06-18 RX ADMIN — DEXTROSE MONOHYDRATE 25 MG: 50 INJECTION, SOLUTION INTRAVENOUS at 14:00

## 2025-06-18 RX ADMIN — DEXTROSE MONOHYDRATE 25 MG: 50 INJECTION, SOLUTION INTRAVENOUS at 19:46

## 2025-06-18 RX ADMIN — QUETIAPINE FUMARATE 12.5 MG: 25 TABLET ORAL at 11:41

## 2025-06-18 RX ADMIN — ALLOPURINOL 300 MG: 300 TABLET ORAL at 08:48

## 2025-06-18 RX ADMIN — CIPROFLOXACIN 500 MG: 500 TABLET ORAL at 08:48

## 2025-06-18 RX ADMIN — ENOXAPARIN SODIUM 40 MG: 40 INJECTION SUBCUTANEOUS at 08:48

## 2025-06-18 RX ADMIN — ATORVASTATIN CALCIUM 20 MG: 20 TABLET, FILM COATED ORAL at 08:48

## 2025-06-18 RX ADMIN — PANTOPRAZOLE SODIUM 40 MG: 40 TABLET, DELAYED RELEASE ORAL at 05:29

## 2025-06-18 RX ADMIN — DOCUSATE SODIUM 100 MG: 100 CAPSULE, LIQUID FILLED ORAL at 17:33

## 2025-06-18 NOTE — PLAN OF CARE
Problem: INFECTION - ADULT  Goal: Absence or prevention of progression during hospitalization  Description: INTERVENTIONS:  - Assess and monitor for signs and symptoms of infection  - Monitor lab/diagnostic results  - Monitor all insertion sites, i.e. indwelling lines, tubes, and drains  - Monitor endotracheal if appropriate and nasal secretions for changes in amount and color  - [place] appropriate cooling/warming therapies per order  - Administer medications as ordered  - Instruct and encourage patient and family to use good hand hygiene technique  - Identify and instruct in appropriate isolation precautions for identified infection/condition  Outcome: Progressing     Problem: SAFETY ADULT  Goal: Patient will remain free of falls  Description: INTERVENTIONS:  - Educate patient/family on patient safety including physical limitations  - Instruct patient to call for assistance with activity   - Consider consulting OT/PT to assist with strengthening/mobility based on AM PAC & JH-HLM score  - Consult OT/PT to assist with strengthening/mobility   - Keep Call bell within reach  - Keep bed low and locked with side rails adjusted as appropriate  - Keep care items and personal belongings within reach  - Initiate and maintain comfort rounds  - Make Fall Risk Sign visible to staff  - Offer Toileting every  Hours, in advance of need  - Initiate/Maintain alarm  - Obtain necessary fall risk management equipment:   - Apply yellow socks and bracelet for high fall risk patients  - Consider moving patient to room near nurses station  Outcome: Progressing

## 2025-06-18 NOTE — PROGRESS NOTES
"Medical Oncology/Hematology Progress Note  Alexis Dennison, male, 64 y.o., 1960,  [id number] [id number]     Assessment and Plan  1. Primary central nervous system (CNS) lymphoma,  double expresser    IN SUMMARY, DLBCL NOS, non-germinal center, Ki-67 60-70, BCL6 rearrangement)   Diagnosed in 4/ 2025. Established with Dr. Phan      On Q-14 days High-dose IV Methotrexate   C1 (4/18) 8 g/m²  C2 (5/2) 3 g/m², dose-reduced due to LETY, delayed x1 day due to E. coli bacteremia   C3 (5/21) 3 g/m²   C4 : 3 g/m² ; given on 6/17/25   Scheduled Start Date/Time: 06/17/25 1430 End Date/Time: 06/17/25 1909 after 1 doses     S/p Rituximab x 5 doses 4/17/25 - 5/23/25 ; next Rituxan planned on 6/19/25     Patient had prescription for temozolomide total to 90 mg a day on days 7 through 11 (6/23 - 6/27)      PLAN   - urine alkalization per protocol ; IV Na Bicarb, Q6H UA , keep PH > 7       pH, UA 6/18/25 1209 8.0     - daily CBC and CMP (6/18 unremarkable vitals and labs)   - MTX level 24H, 48H, etc - in process ; goal before discharge < 0.05   - Q6H Leucovorin 24 hours post MTX   - Outpatient MRI Brain is scheduled 6/26/25   - on ppx allopurinol       2.  Recent history of UTI   UA 6/12/2025 large leukocytes and positive nitrites  He noted occasional but not all the time dysuria  He is afebrile WBC not elevated  urine culture 6/16   Urine Culture No Growth <1000 cfu/mL        Currently on Cipro 6/12 - present ; consider dc and monitor off ABX     __________________________________________    Outpatient follow up plan: With Dr. Phan  Communication with patient/family:  I did update the patient   Communication with team:  Did communicate with Dr. Langley, primary team.   I did review this patient with Dr. Phan and she is in agreement with this plan.              Subjective:  Seen and examined today, no new symptoms. He noted \"peeing a lot \" not dysuria or hematuria. Denies any new symptoms or reactions to MTX. " ROS x 12 unremarkable       Objective:     Medication Administration - last 24 hours from 06/17/2025 1034 to 06/18/2025 1034         Date/Time Order Dose Route Action Action by     06/18/2025 0848 EDT enoxaparin (LOVENOX) subcutaneous injection 40 mg 40 mg Subcutaneous Given Rupali Colmenares RN     06/18/2025 0848 EDT allopurinol (ZYLOPRIM) tablet 300 mg 300 mg Oral Given Rupali Colmenares RN     06/18/2025 0848 EDT atorvastatin (LIPITOR) tablet 20 mg 20 mg Oral Given Rupali Colmenares RN     06/18/2025 0848 EDT ciprofloxacin (CIPRO) tablet 500 mg 500 mg Oral Given Rupali Colmenares RN     06/17/2025 2214 EDT ciprofloxacin (CIPRO) tablet 500 mg 500 mg Oral Given Arsh Roblero RN     06/18/2025 0848 EDT docusate sodium (COLACE) capsule 100 mg 100 mg Oral Given Rupali Colmenares RN     06/17/2025 1603 EDT docusate sodium (COLACE) capsule 100 mg 100 mg Oral Given Ngozi Vale RN     06/18/2025 0529 EDT pantoprazole (PROTONIX) EC tablet 40 mg 40 mg Oral Given Arsh Roblero RN     06/17/2025 2214 EDT QUEtiapine (SEROquel) tablet 12.5 mg 12.5 mg Oral Given Arsh Roblero RN     06/17/2025 1152 EDT QUEtiapine (SEROquel) tablet 12.5 mg 12.5 mg Oral Given Ngozi Vale RN     06/17/2025 1152 EDT senna (SENOKOT) tablet 17.2 mg 17.2 mg Oral Given Ngozi Vale RN     06/17/2025 1604 EDT insulin lispro (HumALOG/ADMELOG) 100 units/mL subcutaneous injection 1-5 Units 3 Units Subcutaneous Given Ngozi Vale RN     06/17/2025 1210 EDT insulin lispro (HumALOG/ADMELOG) 100 units/mL subcutaneous injection 1-5 Units -- Subcutaneous Not Given Ngozi Vale RN     06/17/2025 2216 EDT insulin lispro (HumALOG/ADMELOG) 100 units/mL subcutaneous injection 1-5 Units 5 Units Subcutaneous Given Arsh Roblero RN     06/17/2025 1140 EDT sodium bicarbonate 100 mEq in dextrose 5 % 1,000 mL infusion 0 mL/hr Intravenous Stopped Ngozi Vale RN     06/18/2025 0902 EDT leucovorin 25 mg in dextrose 5 % 50 mL IVPB -- Intravenous  "Canceled Entry Elly Marti, ADELINE     06/17/2025 1130 EDT magnesium sulfate 2 g/50 mL IVPB (premix) 2 g 0 g Intravenous Stopped Ngozi Vale RN     06/17/2025 1152 EDT sodium chloride 0.9 % infusion 20 mL/hr Intravenous New Prasad Vale RN     06/17/2025 1244 EDT ondansetron (ZOFRAN) 16 mg, dexamethasone (DECADRON) 10 mg in sodium chloride 0.9 % 50 mL IVPB -- Intravenous Stopped Ngozi Vale RN     06/17/2025 1152 EDT ondansetron (ZOFRAN) 16 mg, dexamethasone (DECADRON) 10 mg in sodium chloride 0.9 % 50 mL IVPB -- Intravenous New Prasad Vale RN     06/18/2025 0902 EDT sodium bicarbonate 100 mEq in dextrose 5 % 1,000 mL infusion 100 mL/hr Intravenous New Prasad Colmenares RN     06/18/2025 0901 EDT sodium bicarbonate 100 mEq in dextrose 5 % 1,000 mL infusion 0 mL/hr Intravenous Stopped Rupali Colmenares RN     06/17/2025 1159 EDT sodium bicarbonate 100 mEq in dextrose 5 % 1,000 mL infusion 100 mL/hr Intravenous New Prasad Vale RN     06/17/2025 1909 EDT methotrexate (PF) 6,000 mg in sodium chloride 0.9 % 1,000 mL IVPB 0 mg Intravenous Stopped Ngozi Vale RN     06/17/2025 1354 EDT methotrexate (PF) 6,000 mg in sodium chloride 0.9 % 1,000 mL IVPB 6,000 mg Intravenous New Bag Ngozi Vale RN     06/18/2025 0841 EDT insulin lispro (HumALOG/ADMELOG) 100 units/mL subcutaneous injection 1-6 Units -- Subcutaneous Not Given Rupali Colmenares RN     06/18/2025 0041 EDT insulin lispro (HumALOG/ADMELOG) 100 units/mL subcutaneous injection 8 Units 8 Units Subcutaneous Given Arsh Roblero RN     06/18/2025 0848 EDT insulin glargine (LANTUS) subcutaneous injection 10 Units 0.1 mL 10 Units Subcutaneous Given Rupali Colmneares RN            /71   Pulse 73   Temp 98.8 °F (37.1 °C)   Resp 20   Ht 5' 10\" (1.778 m)   Wt 80.4 kg (177 lb 4 oz)   SpO2 100%   BMI 25.43 kg/m²       Physical Exam  - GEN: Flat affect ; appears well, alert and oriented x 3, pleasant and cooperative, in no " acute distress  - HEENT: Anicteric, mucous membranes moist, PERRL and EOMI   - NECK: No lymphadenopathy, JVD or carotid bruits   - HEART: RRR, normal S1 and S2, no murmurs, clicks, gallops or rubs   - LUNGS: Clear to auscultation bilaterally; no wheezes, rales, or rhonchi  - ABDOMEN: Normal bowel sounds, soft, no tenderness, no distention, no organomegaly or masses felt on exam.   - EXTREMITIES: Peripheral pulses normal; no clubbing, cyanosis; trace bilateral feet edema   - NEURO: Slow speech/mild memory deficit noted, no focal findings, CN II-XII are grossly intact.   - Musculoskeletal: 5/5 strength, normal ROM, no swollen or erythematous joints.   - SKIN: Normal without suspicious lesions on exposed skin    Recent Results (from the past 48 hours)   Hemoglobin A1c w/EAG Estimation (Prechecked if no A1C within 90 days)    Collection Time: 06/16/25 11:27 AM   Result Value Ref Range    Hemoglobin A1C 5.5 Normal 4.0-5.6%; PreDiabetic 5.7-6.4%; Diabetic >=6.5%; Glycemic control for adults with diabetes <7.0% %     mg/dl   CBC and differential    Collection Time: 06/16/25 11:27 AM   Result Value Ref Range    WBC 7.86 4.31 - 10.16 Thousand/uL    RBC 3.12 (L) 3.88 - 5.62 Million/uL    Hemoglobin 9.6 (L) 12.0 - 17.0 g/dL    Hematocrit 30.2 (L) 36.5 - 49.3 %    MCV 97 82 - 98 fL    MCH 30.8 26.8 - 34.3 pg    MCHC 31.8 31.4 - 37.4 g/dL    RDW 18.1 (H) 11.6 - 15.1 %    MPV 10.6 8.9 - 12.7 fL    Platelets 341 149 - 390 Thousands/uL    nRBC 0 /100 WBCs    Segmented % 69 43 - 75 %    Immature Grans % 1 0 - 2 %    Lymphocytes % 19 14 - 44 %    Monocytes % 7 4 - 12 %    Eosinophils Relative 2 0 - 6 %    Basophils Relative 2 (H) 0 - 1 %    Absolute Neutrophils 5.37 1.85 - 7.62 Thousands/µL    Absolute Immature Grans 0.11 0.00 - 0.20 Thousand/uL    Absolute Lymphocytes 1.50 0.60 - 4.47 Thousands/µL    Absolute Monocytes 0.55 0.17 - 1.22 Thousand/µL    Eosinophils Absolute 0.18 0.00 - 0.61 Thousand/µL    Basophils Absolute 0.15  (H) 0.00 - 0.10 Thousands/µL   Comprehensive metabolic panel    Collection Time: 06/16/25 11:27 AM   Result Value Ref Range    Sodium 137 135 - 147 mmol/L    Potassium 4.0 3.5 - 5.3 mmol/L    Chloride 102 96 - 108 mmol/L    CO2 30 21 - 32 mmol/L    ANION GAP 5 4 - 13 mmol/L    BUN 19 5 - 25 mg/dL    Creatinine 1.01 0.60 - 1.30 mg/dL    Glucose 203 (H) 65 - 140 mg/dL    Calcium 9.0 8.4 - 10.2 mg/dL    AST 12 (L) 13 - 39 U/L    ALT 10 7 - 52 U/L    Alkaline Phosphatase 77 34 - 104 U/L    Total Protein 6.3 (L) 6.4 - 8.4 g/dL    Albumin 4.0 3.5 - 5.0 g/dL    Total Bilirubin 0.30 0.20 - 1.00 mg/dL    eGFR 77 ml/min/1.73sq m   Magnesium    Collection Time: 06/16/25 11:27 AM   Result Value Ref Range    Magnesium 1.7 (L) 1.9 - 2.7 mg/dL   Fingerstick Glucose (POCT)    Collection Time: 06/16/25 12:15 PM   Result Value Ref Range    POC Glucose 212 (H) 65 - 140 mg/dl   UA (URINE) with reflex to Scope    Collection Time: 06/16/25  2:29 PM   Result Value Ref Range    Color, UA Light Yellow     Clarity, UA Clear     Specific Gravity, UA 1.020 1.003 - 1.030    pH, UA 5.5 4.5, 5.0, 5.5, 6.0, 6.5, 7.0, 7.5, 8.0    Leukocytes, UA Negative Negative    Nitrite, UA Negative Negative    Protein, UA Trace (A) Negative mg/dl    Glucose, UA Trace (A) Negative mg/dl    Ketones, UA Negative Negative mg/dl    Urobilinogen, UA <2.0 <2.0 mg/dl mg/dl    Bilirubin, UA Negative Negative    Occult Blood, UA Negative Negative   Urine Microscopic    Collection Time: 06/16/25  2:29 PM   Result Value Ref Range    RBC, UA 1-2 None Seen, 1-2 /hpf    WBC, UA 1-2 None Seen, 1-2 /hpf    Epithelial Cells None Seen None Seen, Occasional /hpf    Bacteria, UA None Seen None Seen, Occasional /hpf   Fingerstick Glucose (POCT)    Collection Time: 06/16/25  4:48 PM   Result Value Ref Range    POC Glucose 160 (H) 65 - 140 mg/dl   Urine culture    Collection Time: 06/16/25  4:59 PM    Specimen: Urine, Clean Catch   Result Value Ref Range    Urine Culture No Growth  <1000 cfu/mL    Fingerstick Glucose (POCT)    Collection Time: 06/16/25  8:23 PM   Result Value Ref Range    POC Glucose 175 (H) 65 - 140 mg/dl   Urinalysis with microscopic    Collection Time: 06/16/25 10:03 PM   Result Value Ref Range    Color, UA Colorless     Clarity, UA Clear     Specific Gravity, UA 1.009 1.003 - 1.030    pH, UA 7.0 4.5, 5.0, 5.5, 6.0, 6.5, 7.0, 7.5, 8.0    Leukocytes, UA Negative Negative    Nitrite, UA Negative Negative    Protein, UA Negative Negative mg/dl    Glucose, UA Negative Negative mg/dl    Ketones, UA Negative Negative mg/dl    Urobilinogen, UA <2.0 <2.0 mg/dl mg/dl    Bilirubin, UA Negative Negative    Occult Blood, UA Negative Negative    RBC, UA None Seen None Seen, 1-2 /hpf    WBC, UA 1-2 None Seen, 1-2 /hpf    Epithelial Cells None Seen None Seen, Occasional /hpf    Bacteria, UA Occasional None Seen, Occasional /hpf   Urinalysis with microscopic    Collection Time: 06/17/25  4:44 AM   Result Value Ref Range    Color, UA Colorless     Clarity, UA Clear     Specific Gravity, UA 1.005 1.003 - 1.030    pH, UA 8.0 4.5, 5.0, 5.5, 6.0, 6.5, 7.0, 7.5, 8.0    Leukocytes, UA Negative Negative    Nitrite, UA Negative Negative    Protein, UA Negative Negative mg/dl    Glucose, UA Negative Negative mg/dl    Ketones, UA Negative Negative mg/dl    Urobilinogen, UA <2.0 <2.0 mg/dl mg/dl    Bilirubin, UA Negative Negative    Occult Blood, UA Negative Negative    RBC, UA None Seen None Seen, 1-2 /hpf    WBC, UA None Seen None Seen, 1-2 /hpf    Epithelial Cells None Seen None Seen, Occasional /hpf    Bacteria, UA None Seen None Seen, Occasional /hpf   Basic metabolic panel    Collection Time: 06/17/25  4:44 AM   Result Value Ref Range    Sodium 142 135 - 147 mmol/L    Potassium 3.6 3.5 - 5.3 mmol/L    Chloride 101 96 - 108 mmol/L    CO2 34 (H) 21 - 32 mmol/L    ANION GAP 7 4 - 13 mmol/L    BUN 15 5 - 25 mg/dL    Creatinine 0.97 0.60 - 1.30 mg/dL    Glucose 161 (H) 65 - 140 mg/dL    Calcium 8.5  8.4 - 10.2 mg/dL    eGFR 81 ml/min/1.73sq m   Magnesium    Collection Time: 06/17/25  4:44 AM   Result Value Ref Range    Magnesium 1.7 (L) 1.9 - 2.7 mg/dL   CBC and differential    Collection Time: 06/17/25  4:44 AM   Result Value Ref Range    WBC 6.31 4.31 - 10.16 Thousand/uL    RBC 2.78 (L) 3.88 - 5.62 Million/uL    Hemoglobin 8.6 (L) 12.0 - 17.0 g/dL    Hematocrit 26.1 (L) 36.5 - 49.3 %    MCV 94 82 - 98 fL    MCH 30.9 26.8 - 34.3 pg    MCHC 33.0 31.4 - 37.4 g/dL    RDW 17.7 (H) 11.6 - 15.1 %    MPV 10.0 8.9 - 12.7 fL    Platelets 266 149 - 390 Thousands/uL    nRBC 0 /100 WBCs    Segmented % 55 43 - 75 %    Immature Grans % 1 0 - 2 %    Lymphocytes % 29 14 - 44 %    Monocytes % 10 4 - 12 %    Eosinophils Relative 3 0 - 6 %    Basophils Relative 2 (H) 0 - 1 %    Absolute Neutrophils 3.54 1.85 - 7.62 Thousands/µL    Absolute Immature Grans 0.07 0.00 - 0.20 Thousand/uL    Absolute Lymphocytes 1.82 0.60 - 4.47 Thousands/µL    Absolute Monocytes 0.60 0.17 - 1.22 Thousand/µL    Eosinophils Absolute 0.17 0.00 - 0.61 Thousand/µL    Basophils Absolute 0.11 (H) 0.00 - 0.10 Thousands/µL   Fingerstick Glucose (POCT)    Collection Time: 06/17/25  7:48 AM   Result Value Ref Range    POC Glucose 145 (H) 65 - 140 mg/dl   Urinalysis with microscopic    Collection Time: 06/17/25  9:59 AM   Result Value Ref Range    Color, UA Light Yellow     Clarity, UA Clear     Specific Gravity, UA 1.010 1.003 - 1.030    pH, UA 8.0 4.5, 5.0, 5.5, 6.0, 6.5, 7.0, 7.5, 8.0    Leukocytes, UA Negative Negative    Nitrite, UA Negative Negative    Protein, UA Negative Negative mg/dl    Glucose, UA Negative Negative mg/dl    Ketones, UA Negative Negative mg/dl    Urobilinogen, UA <2.0 <2.0 mg/dl mg/dl    Bilirubin, UA Negative Negative    Occult Blood, UA Negative Negative    RBC, UA None Seen None Seen, 1-2 /hpf    WBC, UA None Seen None Seen, 1-2 /hpf    Epithelial Cells None Seen None Seen, Occasional /hpf    Bacteria, UA None Seen None Seen,  Occasional /hpf   Fingerstick Glucose (POCT)    Collection Time: 06/17/25 11:54 AM   Result Value Ref Range    POC Glucose 137 65 - 140 mg/dl   Urinalysis with microscopic    Collection Time: 06/17/25  3:55 PM   Result Value Ref Range    Color, UA Light Yellow     Clarity, UA Clear     Specific Gravity, UA 1.007 1.003 - 1.030    pH, UA 8.0 4.5, 5.0, 5.5, 6.0, 6.5, 7.0, 7.5, 8.0    Leukocytes, UA Negative Negative    Nitrite, UA Negative Negative    Protein, UA Trace (A) Negative mg/dl    Glucose,  (1/5%) (A) Negative mg/dl    Ketones, UA Negative Negative mg/dl    Urobilinogen, UA <2.0 <2.0 mg/dl mg/dl    Bilirubin, UA Negative Negative    Occult Blood, UA Negative Negative    RBC, UA None Seen None Seen, 1-2 /hpf    WBC, UA None Seen None Seen, 1-2 /hpf    Epithelial Cells None Seen None Seen, Occasional /hpf    Bacteria, UA None Seen None Seen, Occasional /hpf   Fingerstick Glucose (POCT)    Collection Time: 06/17/25  4:01 PM   Result Value Ref Range    POC Glucose 297 (H) 65 - 140 mg/dl   Fingerstick Glucose (POCT)    Collection Time: 06/17/25 10:07 PM   Result Value Ref Range    POC Glucose 422 (HH) 65 - 140 mg/dl   Urinalysis with microscopic    Collection Time: 06/17/25 10:12 PM   Result Value Ref Range    Color, UA Light Yellow     Clarity, UA Clear     Specific Gravity, UA 1.018 1.003 - 1.030    pH, UA 8.0 4.5, 5.0, 5.5, 6.0, 6.5, 7.0, 7.5, 8.0    Leukocytes, UA (A) Negative     Elevated glucose may cause decreased leukocyte values. See urine microscopic for UWBC result    Nitrite, UA Negative Negative    Protein, UA 50 (1+) (A) Negative mg/dl    Glucose, UA >=1000 (1%) (A) Negative mg/dl    Ketones, UA Negative Negative mg/dl    Urobilinogen, UA <2.0 <2.0 mg/dl mg/dl    Bilirubin, UA Negative Negative    Occult Blood, UA Negative Negative    RBC, UA None Seen None Seen, 1-2 /hpf    WBC, UA None Seen None Seen, 1-2 /hpf    Epithelial Cells None Seen None Seen, Occasional /hpf    Bacteria, UA None Seen  None Seen, Occasional /hpf   Fingerstick Glucose (POCT)    Collection Time: 06/17/25 11:17 PM   Result Value Ref Range    POC Glucose 405 (HH) 65 - 140 mg/dl   Fingerstick Glucose (POCT)    Collection Time: 06/18/25 12:26 AM   Result Value Ref Range    POC Glucose 356 (H) 65 - 140 mg/dl   Urinalysis with microscopic    Collection Time: 06/18/25  5:21 AM   Result Value Ref Range    Color, UA Light Yellow     Clarity, UA Clear     Specific Gravity, UA 1.008 1.003 - 1.030    pH, UA 8.0 4.5, 5.0, 5.5, 6.0, 6.5, 7.0, 7.5, 8.0    Leukocytes, UA Negative Negative    Nitrite, UA Negative Negative    Protein, UA Trace (A) Negative mg/dl    Glucose,  (3/10%) (A) Negative mg/dl    Ketones, UA Negative Negative mg/dl    Urobilinogen, UA <2.0 <2.0 mg/dl mg/dl    Bilirubin, UA Negative Negative    Occult Blood, UA Negative Negative    RBC, UA None Seen None Seen, 1-2 /hpf    WBC, UA None Seen None Seen, 1-2 /hpf    Epithelial Cells None Seen None Seen, Occasional /hpf    Bacteria, UA None Seen None Seen, Occasional /hpf   CBC    Collection Time: 06/18/25  5:22 AM   Result Value Ref Range    WBC 15.64 (H) 4.31 - 10.16 Thousand/uL    RBC 2.80 (L) 3.88 - 5.62 Million/uL    Hemoglobin 8.8 (L) 12.0 - 17.0 g/dL    Hematocrit 25.6 (L) 36.5 - 49.3 %    MCV 91 82 - 98 fL    MCH 31.4 26.8 - 34.3 pg    MCHC 34.4 31.4 - 37.4 g/dL    RDW 17.2 (H) 11.6 - 15.1 %    Platelets 302 149 - 390 Thousands/uL    MPV 10.4 8.9 - 12.7 fL   Basic metabolic panel    Collection Time: 06/18/25  5:22 AM   Result Value Ref Range    Sodium 140 135 - 147 mmol/L    Potassium 3.9 3.5 - 5.3 mmol/L    Chloride 102 96 - 108 mmol/L    CO2 31 21 - 32 mmol/L    ANION GAP 7 4 - 13 mmol/L    BUN 15 5 - 25 mg/dL    Creatinine 1.04 0.60 - 1.30 mg/dL    Glucose 119 65 - 140 mg/dL    Calcium 8.6 8.4 - 10.2 mg/dL    eGFR 74 ml/min/1.73sq m   Magnesium    Collection Time: 06/18/25  5:22 AM   Result Value Ref Range    Magnesium 2.2 1.9 - 2.7 mg/dL   Phosphorus    Collection  Time: 06/18/25  5:22 AM   Result Value Ref Range    Phosphorus 2.7 2.3 - 4.1 mg/dL   Fingerstick Glucose (POCT)    Collection Time: 06/18/25  8:31 AM   Result Value Ref Range    POC Glucose 126 65 - 140 mg/dl       NM PET CT skull base to mid thigh  Result Date: 6/16/2025  Narrative: PET/CT SCAN INDICATION: C83.390: Primary central nervous system lymphoma, restaging MODIFIER: PS COMPARISON: CT of abdomen and pelvis dated 5/18/2025, MRI of the brain, cervical spine, thoracic spine, and lumbar spine dated 4/19/25, CT angiography of the chest, abdomen, and pelvis dated 3/29/2025 CELL TYPE: Involved by pt's large B-cell lymphoma (bx 4/13/25 CSF +) TECHNIQUE:   8.2 mCi F-18-FDG administered IV. Multiplanar attenuation corrected and non attenuation corrected PET images are available for interpretation, and contiguous, low dose, axial CT sections were obtained from the skull vertex through the femurs. Intravenous contrast material was not utilized. This examination, like all CT scans performed in the [facility], was performed utilizing techniques to minimize radiation dose exposure, including the use of iterative reconstruction and automated exposure control. Fasting serum glucose: 115 mg/dl FINDINGS: VISUALIZED BRAIN: On PET image 37 there is subtle asymmetric focal activity involving the left cerebral hemisphere slightly posterior to the left frontal horn which is difficult to correlate on low-dose unenhanced CT but measures approximately 1.5 cm on PET imaging with max SUV of 7.7; this finding is in the expected location patient's known enhancing intracranial mass which is better characterized on MRI of the brain dated 4/20/25 and could reflect mild hypermetabolic malignant activity versus asymmetric activity involving the left basal ganglia which is in this region. Patient's known intracranial malignancy is better characterized on MRI of the brain given background physiologic brain activity and  continued follow-up with MRI of the brain is recommended. HEAD/NECK: On image 20 there is mild FDG activity associated with cutaneous/subcutaneous soft tissue density involving the left frontal scalp which appears superficial to a siomara hole in the left frontal calvarium extending from images 21 through 24 of series 3. On image 20 of series 3 this finding measures approximately 1.3 cm in length with max SUV of 3.9 and possibly reflects inflammatory activity although correlation is recommended to exclude the possibility of cutaneous/subcutaneous malignancy of low metabolic  activity. CT images: Essentially stable prominence to the lateral ventricles which is better characterized on prior dedicated MRI of the brain. Stable asymmetric nodular enlargement of the left thyroid gland which was better characterized on thyroid ultrasound dated 6//25 with recommendation for tissue sampling was made. On image 93 of series 3 there is a nonspecific mildly prominent common nonpathologic in size left perithyroidal/lower cervical lymph node measuring 6 mm in short axis. CHEST: No FDG avid soft tissue lesions are seen. CT images: Trace gynecomastia. Atherosclerotic vascular calcifications including those of the coronary arteries are noted. Tiny calcified right middle lobe granuloma. On image 129 of series 3 there is a new 6 mm left lower lobe lung nodule, possibly related to adjacent subtle patchy likely inflammatory groundglass airspace disease although given known malignancy, short interval follow-up with CT of chest in 3 months is recommended to ensure stability. ABDOMEN: Fairly diffuse nonspecific nonuniform bowel activity, possibly physiologic which limits evaluation for underlying hypermetabolic bowel malignancy. CT images: Stable right hepatic hypodensity which was better characterized on prior MRI of abdomen where it was characterized as a cyst. Atherosclerotic vascular calcifications are noted. PELVIS: No FDG avid soft  tissue lesions are seen. CT images: Enlarged prostate gland. Mild nonspecific diffuse mural thickening of the urinary bladder wall which is difficult to evaluate secondary to under distention. OSSEOUS STRUCTURES: No FDG avid lesions are seen. CT images: No significant findings.     Impression: 1.  Subtle asymmetric focal FDG activity involving the left cerebral hemisphere in the expected location of patient's known enhancing intracranial mass which could reflect mild hypermetabolic malignancy versus asymmetric physiologic activity involving the left basal ganglia. Patient's known intracranial malignancy is better characterized on MRI of the brain and continued follow-up with MRI of the brain is recommended as clinically indicated. 2.  Mild nonspecific FDG activity associated with cutaneous/subcutaneous soft tissue density involving the left frontal scalp which appears superficial to a siomara hole in the left frontal calvarium, possibly reflecting inflammatory activity. Correlation with direct visualization is recommended to exclude the possibility of cutaneous/subcutaneous malignancy of low metabolic activity. 3.  New 6 mm left lower lobe lung nodule, possibly inflammatory. Short interval follow-up with CT of chest in 3 months is recommended to ensure stability. 4.  Please see above for details and additional findings. The study was marked in EPIC for significant notification. The study was marked in Epic for follow-up. Workstation performed: [id number]     US thyroid  Result Date: 6/11/2025  Narrative: THYROID ULTRASOUND INDICATION: E04.1: Nontoxic single thyroid nodule. COMPARISON: CTA head and neck 3/24/2025 TECHNIQUE: Ultrasound of the thyroid was performed with a high frequency linear transducer in transverse and sagittal planes including volumetric imaging sweeps as well as traditional still imaging technique. FINDINGS: Thyroid texture: Thyroid is mildly inhomogeneous and hypervascular. Right lobe: 5.0 x 1.5 x  1.9 cm. Volume 6.9 mL Left lobe: 6.0 x 3.1 x 2.8 cm. Volume 24.9 mL Isthmus: 0.3 cm. Nodule #1. Image 33. LEFT anterior mid to lower pole nodule. The length in depth is difficult to accurately determine as it is abutting an additional posterior nodule, best estimate measuring 3.1 x 1.4 x 2.1 cm (series 200 image 105 of 200). No prior ultrasound. COMPOSITION: 2 points, solid or almost completely solid. ECHOGENICITY: 1 point, hyperechoic or isoechoic. SHAPE: 0 points, wider-than-tall. MARGIN: 0 points, ill-defined. ECHOGENIC FOCI: 0 points, none or large comet-tail artifacts. TI-RADS Classification: TR 3 (3 points), FNA if >/= 2.5 cm. Follow if >/= 1.5 cm. Nodule #2. Image 36. LEFT posterior midgland nodule measuring 2.3 x 1.5 x 2.2 cm. No prior ultrasound. COMPOSITION: 2 points, solid or almost completely solid. ECHOGENICITY: 1 point, hyperechoic or isoechoic. SHAPE: 0 points, wider-than-tall. MARGIN: 0 points, smooth. ECHOGENIC FOCI: 0 points, none or large comet-tail artifacts. TI-RADS Classification: TR 3 (3 points), FNA if >/= 2.5 cm. Follow if >/= 1.5 cm. There are additional nodules of lesser size and/or TI-RADS score. These do not necessitate additional evaluation based on ACR criteria.     Impression: Asymmetric left thyroid enlargement with associated nodules corresponding to previous CT findings. The following meet current ACR criteria for recommending ultrasound guided biopsy: 1. LEFT anterior mid to lower pole 3.1 cm nodule (Image 33). 2. LEFT posterior midgland nodule measuring 2.3 cm (Image 36). Reference: ACR Thyroid Imaging, Reporting and Data System (TI-RADS): White Paper of the ACR TI-RADS Committee. J AM Winifred Radiol 2017;14:587-595. Additional recommendations based on American Thyroid Association 2015 guidelines. The study was marked in EPIC for significant notification and follow-up. Workstation performed: [id number]     CT abdomen pelvis w contrast  Result Date: 5/19/2025  Narrative: CT ABDOMEN  AND PELVIS WITH IV CONTRAST INDICATION: recurrent bacteremia, look for source. Blood culture and urine culture positive for gram-negative rods. COMPARISON: April 30, 2025. TECHNIQUE: CT examination of the abdomen and pelvis was performed. Multiplanar 2D reformatted images were created from the source data. This examination, like all CT scans performed in the [facility], was performed utilizing techniques to minimize radiation dose exposure, including the use of iterative reconstruction and automated exposure control. Radiation dose length product (DLP) for this visit: 390.85 mGy-cm. IV Contrast: 85 mL of iohexol (OMNIPAQUE) Enteric Contrast: Not administered. FINDINGS: ABDOMEN LOWER CHEST: A small calcified granuloma is redemonstrated anteriorly at the right lung base. There is coronary artery disease. A catheter terminates in the right atrium. LIVER/BILIARY TREE: There is a 13 x 9 mm cyst posteriorly in the right lobe of the liver, similar to the prior study. GALLBLADDER: No calcified gallstones. No pericholecystic inflammatory change. SPLEEN: Unremarkable. PANCREAS: Unremarkable. ADRENAL GLANDS: Unremarkable. KIDNEYS/URETERS: Mild bilateral nonspecific perinephric stranding, increased somewhat compared with April 30, 2025. No hydronephrosis bilaterally. STOMACH AND BOWEL: No bowel obstruction. No pericolonic inflammatory change. APPENDIX: Normal. ABDOMINOPELVIC CAVITY: There is a small amount of free fluid, best demonstrated in the right lower quadrant and paracolic gutters. No pneumoperitoneum. VESSELS: Atherosclerosis. No abdominal aortic aneurysm. PELVIS REPRODUCTIVE ORGANS: Unremarkable for patient's age. URINARY BLADDER: Unremarkable. ABDOMINAL WALL/INGUINAL REGIONS: There is some gas within the subcutaneous fat of the anterior abdominal walls near the level of the umbilicus, left more so than right, possibly related to recent injections. Small fat-containing left inguinal hernia. BONES: No  acute fracture or suspicious osseous lesion. Spinal degenerative changes, most notably at L5-S1.     Impression: There is mild bilateral nonspecific perinephric stranding, however, this does appear somewhat increased compared with April 30, 2025. Correlation with urinalysis and urine culture and sensitivity is recommended. There is a small amount of free fluid. Other nonemergent findings as above. Workstation performed: [id number]       I have personally reviewed labs, imaging studies, and pertinent reports.      This note has been generated by voice recognition software system.  Therefore, there may be spelling, grammar, and or syntax errors. Please contact if questions arise.     Simba Phillips,    Hematology and Medical Oncology - PGY V  [facility]

## 2025-06-18 NOTE — PLAN OF CARE
Problem: PAIN - ADULT  Goal: Verbalizes/displays adequate comfort level or baseline comfort level  Description: Interventions:  - Encourage patient to monitor pain and request assistance  - Assess pain using appropriate pain scale  - Administer analgesics as ordered based on type and severity of pain and evaluate response  - Implement non-pharmacological measures as appropriate and evaluate response  - Consider cultural and social influences on pain and pain management  - Notify physician/advanced practitioner if interventions unsuccessful or patient reports new pain  - Educate patient/family on pain management process including their role and importance of  reporting pain   - Provide non-pharmacologic/complimentary pain relief interventions  Outcome: Progressing     Problem: INFECTION - ADULT  Goal: Absence or prevention of progression during hospitalization  Description: INTERVENTIONS:  - Assess and monitor for signs and symptoms of infection  - Monitor lab/diagnostic results  - Monitor all insertion sites, i.e. indwelling lines, tubes, and drains  - Monitor endotracheal if appropriate and nasal secretions for changes in amount and color  - [place] appropriate cooling/warming therapies per order  - Administer medications as ordered  - Instruct and encourage patient and family to use good hand hygiene technique  - Identify and instruct in appropriate isolation precautions for identified infection/condition  Outcome: Progressing     Problem: SAFETY ADULT  Goal: Maintain or return to baseline ADL function  Description: INTERVENTIONS:  -  Assess patient's ability to carry out ADLs; assess patient's baseline for ADL function and identify physical deficits which impact ability to perform ADLs (bathing, care of mouth/teeth, toileting, grooming, dressing, etc.)  - Assess/evaluate cause of self-care deficits   - Assess range of motion  - Assess patient's mobility; develop plan if impaired  - Assess patient's need for  assistive devices and provide as appropriate  - Encourage maximum independence but intervene and supervise when necessary  - Involve family in performance of ADLs  - Assess for home care needs following discharge   - Consider OT consult to assist with ADL evaluation and planning for discharge  - Provide patient education as appropriate  - Monitor functional capacity and physical performance, use of AM PAC & JH-HLM   - Monitor gait, balance and fatigue with ambulation    Outcome: Progressing     Problem: Knowledge Deficit  Goal: Patient/family/caregiver demonstrates understanding of disease process, treatment plan, medications, and discharge instructions  Description: Complete learning assessment and assess knowledge base.  Interventions:  - Provide teaching at level of understanding  - Provide teaching via preferred learning methods  Outcome: Progressing     Problem: Prexisting or High Potential for Compromised Skin Integrity  Goal: Skin integrity is maintained or improved  Description: INTERVENTIONS:  - Identify patients at risk for skin breakdown  - Assess and monitor skin integrity including under and around medical devices   - Assess and monitor nutrition and hydration status  - Monitor labs  - Assess for incontinence   - Turn and reposition patient  - Assist with mobility/ambulation  - Relieve pressure over an[place]ieta prominences   - Avoid friction and shearing  - Provide appropriate hygiene as needed including keeping skin clean and dry  - Evaluate need for skin moisturizer/barrier cream  - Collaborate with interdisciplinary team  - Patient/family teaching  - Consider wound care consult    Assess:  - Review [place] scale daily  - Clean and moisturize skin every shift   - Inspect skin when repositioning, toileting, and assisting with ADLS  - Assess under medical devices such as mossimo every 2h  - Assess extremities for adequate circulation and sensation     Bed Management:  - Have minimal linens on bed & keep  smooth, unwrinkled  - Change linens as needed when moist or perspiring  - Avoid sitting or lying in one position for more than 2 hours while in bed?Keep HOB at 30 degrees   - Toileting:  - Offer bedside commode  - Assess for incontinence every 2  - Use incontinent care products after each incontinent episode such as     Activity:  - Mobilize patient 3 times a day  - Encourage activity and walks on unit  - Encourage or provide ROM exercises   - Turn and reposition patient every 2 Hours  - Use appropriate equipment to lift or move patient in bed  - Instruct/ Assist with weight shifting every 2 when out of bed in chair  - Consider limitation of chair time 4 hour intervals    Skin Care:  - Avoid use of baby powder, tape, friction and shearing, hot water or constrictive clothing  - Relieve pressure over bony prominences using   - Do not massage red bony areas    Next Steps:  - Teach patient strategies to minimize risks such as   - Consider consults to  interdisciplinary teams such as   Outcome: Progressing

## 2025-06-18 NOTE — PROGRESS NOTES
Progress Note - Hospitalist   Name: Alexis Dennison 64 y.o. male I MRN: [id number]  [facility] Date of Admission: 6/16/2025   Date of Service: 6/18/2025 I Hospital Day: 2    Assessment & Plan  Primary central nervous system (CNS) lymphoma  recently-diagnosed primary CNS (Dx 4/14/2025, double expressor (BCL2+/MYC+), DLBCL NOS, non-germinal center, Ki-67 60-70, BCL6 rearrangement   treatment course of high-dose MTX + rituximab complicated by sepsis due to recurrent E. coli bacteremia, LETY, and transaminitis.    Presents for inpatient chemotherapy with methotrexate and rituximab  Management per hematology/oncology service  Type 2 diabetes mellitus with hyperglycemia, without long-term current use of insulin (HCC)  Controlled with last hemoglobin A1c in February 6 0.6%  Hold home oral regimen  Monitor on sliding scale  Consistent carb diet  Hypoglycemia protocol  Due to IV bicarb infusion (containing dextrose) will add lantus 10 units AM to help control his glucose    HTN, goal below 130/80  Not on any antihypertensives  Continue to monitor  Mixed hyperlipidemia  Continue statin  UTI (urinary tract infection)  UA and culture are negative for growth  Observe off antibiotics  Dental infection  Has been taking amoxicillin TID since 6/12, today will be day 7/7 of antibiotics    VTE Pharmacologic Prophylaxis: VTE Score: 4 Moderate Risk (Score 3-4) - Pharmacological DVT Prophylaxis Ordered: heparin.    Mobility:   Basic Mobility Inpatient Raw Score: 20  JH-HLM Goal: 6: Walk 10 steps or more  JH-HLM Achieved: 6: Walk 10 steps or more  JH-HLM Goal NOT achieved. Continue with multidisciplinary rounding and encourage appropriate mobility to improve upon JH-HLM goals.    Patient Centered Rounds: I performed bedside rounds with nursing staff today.   Discussions with Specialists or Other Care Team Provider: nurse, CM, oncology    Current Length of Stay: 2 day(s)  Current Patient Status: Inpatient   Certification  Statement: The patient will continue to require additional inpatient hospital stay due to IP chemotherapy  Discharge Plan: Anticipate discharge in 24-48 hrs to home.    Code Status: Level 1 - Full Code    Subjective   Denies any complaints today    Objective :  Temp:  [97.8 °F (36.6 °C)-98.8 °F (37.1 °C)] 98.8 °F (37.1 °C)  HR:  [68-95] 73  BP: (115-138)/(61-78) 134/71  Resp:  [15-20] 20  SpO2:  [94 %-100 %] 94 %  O2 Device: None (Room air)    Body mass index is 25.43 kg/m².     Input and Output Summary (last 24 hours):     Intake/Output Summary (Last 24 hours) at 6/18/2025 1536  Last data filed at 6/18/2025 1407  Gross per 24 hour   Intake 970 ml   Output 2810 ml   Net -1840 ml       Physical Exam  Constitutional:       Appearance: Normal appearance.   HENT:      Head: Normocephalic and atraumatic.      Nose: Nose normal.     Eyes:      Extraocular Movements: Extraocular movements intact.       Cardiovascular:      Rate and Rhythm: Normal rate and regular rhythm.   Pulmonary:      Effort: Pulmonary effort is normal.      Breath sounds: No wheezing or rales.     Musculoskeletal:      Right lower leg: No edema.      Left lower leg: No edema.     Neurological:      Mental Status: He is alert and oriented to person, place, and time.     Psychiatric:         Mood and Affect: Mood normal.         Behavior: Behavior normal.           Lines/Drains:  Lines/Drains/Airways       Active Status       Name Placement date Placement time Site Days    PICC Line 06/16/25 Left Brachial 06/16/25  1652  Brachial  1                    Central Line:  Goal for removal: Port accessed. Will de-access as appropriate.               Lab Results: I have reviewed the following results:   Results from last 7 days   Lab Units 06/18/25  0522 06/17/25  0444   WBC Thousand/uL 15.64* 6.31   HEMOGLOBIN g/dL 8.8* 8.6*   HEMATOCRIT % 25.6* 26.1*   PLATELETS Thousands/uL 302 266   SEGS PCT %  --  55   LYMPHO PCT %  --  29   MONO PCT %  --  10   EOS PCT %   --  3     Results from last 7 days   Lab Units 06/18/25  0522 06/17/25  0444 06/16/25  1127   SODIUM mmol/L 140   < > 137   POTASSIUM mmol/L 3.9   < > 4.0   CHLORIDE mmol/L 102   < > 102   CO2 mmol/L 31   < > 30   BUN mg/dL 15   < > 19   CREATININE mg/dL 1.04   < > 1.01   ANION GAP mmol/L 7   < > 5   CALCIUM mg/dL 8.6   < > 9.0   ALBUMIN g/dL  --   --  4.0   TOTAL BILIRUBIN mg/dL  --   --  0.30   ALK PHOS U/L  --   --  77   ALT U/L  --   --  10   AST U/L  --   --  12*   GLUCOSE RANDOM mg/dL 119   < > 203*    < > = values in this interval not displayed.         Results from last 7 days   Lab Units 06/18/25  1127 06/18/25  0831 06/18/25  0026 06/17/25  2317 06/17/25  2207 06/17/25  1601 06/17/25  1154 06/17/25  0748 06/16/25  2023 06/16/25  1648 06/16/25  1215 06/13/25  0616   POC GLUCOSE mg/dl 130 126 356* 405* 422* 297* 137 145* 175* 160* 212* 115     Results from last 7 days   Lab Units 06/16/25  1127   HEMOGLOBIN A1C % 5.5           Recent Cultures (last 7 days):   Results from last 7 days   Lab Units 06/16/25  1659   URINE CULTURE  No Growth <1000 cfu/mL       Imaging Results Review: No pertinent imaging studies reviewed.  Other Study Results Review: No additional pertinent studies reviewed.    Last 24 Hours Medication List:     Current Facility-Administered Medications:     acetaminophen (TYLENOL) tablet 650 mg, Q6H PRN    allopurinol (ZYLOPRIM) tablet 300 mg, Daily    alteplase (CATHFLO) injection 2 mg, Q1MIN PRN    atorvastatin (LIPITOR) tablet 20 mg, Daily    docusate sodium (COLACE) capsule 100 mg, BID    enoxaparin (LOVENOX) subcutaneous injection 40 mg, Daily    insulin glargine (LANTUS) subcutaneous injection 10 Units 0.1 mL, QAM    insulin lispro (HumALOG/ADMELOG) 100 units/mL subcutaneous injection 1-6 Units, 4x Daily (AC & HS) **AND** Fingerstick Glucose (POCT), 4x Daily AC and at bedtime    leucovorin 25 mg in dextrose 5 % 50 mL IVPB, Q6H, Last Rate: 25 mg (06/18/25 1400)    ondansetron (ZOFRAN)  injection 4 mg, Q6H PRN    pantoprazole (PROTONIX) EC tablet 40 mg, Early Morning    QUEtiapine (SEROquel) tablet 12.5 mg, BID    senna (SENOKOT) tablet 17.2 mg, Daily With Lunch    sodium bicarbonate 100 mEq in dextrose 5 % 1,000 mL infusion, Continuous, Last Rate: 100 mL/hr (06/18/25 0902)    sodium chloride 0.9 % infusion, Once PRN, Last Rate: 20 mL/hr (06/17/25 1152)    Administrative Statements   Today, Patient Was Seen By: Raul Langley MD  I have spent a total time of 40 minutes in caring for this patient on the day of the visit/encounter including Diagnostic results, Instructions for management, Patient and family education, Impressions, Counseling / Coordination of care, Documenting in the medical record, Reviewing/placing orders in the medical record (including tests, medications, and/or procedures), Obtaining or reviewing history  , and Communicating with other healthcare professionals .    **Please Note: This note may have been constructed using a voice recognition system.**

## 2025-06-18 NOTE — PROGRESS NOTES
Attempted three times to get pt to urinate in urinal unsuccessfully for a urine pH testing post Methotrexate. Found pt incontinent of urine and empty bladder scanned. Trying condom catheter to minimize chemo contamination and to attempt to obtain urine pH sample. Notified SLIM and Hem-Onc.

## 2025-06-18 NOTE — ASSESSMENT & PLAN NOTE
Controlled with last hemoglobin A1c in February 6 0.6%  Hold home oral regimen  Monitor on sliding scale  Consistent carb diet  Hypoglycemia protocol  Due to IV bicarb infusion (containing dextrose) will add lantus 10 units AM to help control his glucose

## 2025-06-19 DIAGNOSIS — C83.390 PRIMARY CENTRAL NERVOUS SYSTEM (CNS) LYMPHOMA: ICD-10-CM

## 2025-06-19 DIAGNOSIS — C83.390 PRIMARY CNS LYMPHOMA: ICD-10-CM

## 2025-06-19 DIAGNOSIS — G93.40 ENCEPHALOPATHY, UNSPECIFIED TYPE: ICD-10-CM

## 2025-06-19 LAB
ANION GAP SERPL CALCULATED.3IONS-SCNC: 7 MMOL/L (ref 4–13)
BACTERIA UR QL AUTO: ABNORMAL /HPF
BACTERIA UR QL AUTO: NORMAL /HPF
BASOPHILS # BLD AUTO: 0.06 THOUSANDS/ÂΜL (ref 0–0.1)
BASOPHILS NFR BLD AUTO: 1 % (ref 0–1)
BILIRUB UR QL STRIP: NEGATIVE
BUN SERPL-MCNC: 13 MG/DL (ref 5–25)
CALCIUM SERPL-MCNC: 8.5 MG/DL (ref 8.4–10.2)
CHLORIDE SERPL-SCNC: 103 MMOL/L (ref 96–108)
CLARITY UR: CLEAR
CO2 SERPL-SCNC: 33 MMOL/L (ref 21–32)
COLOR UR: COLORLESS
CREAT SERPL-MCNC: 0.94 MG/DL (ref 0.6–1.3)
EOSINOPHIL # BLD AUTO: 0.12 THOUSAND/ÂΜL (ref 0–0.61)
EOSINOPHIL NFR BLD AUTO: 1 % (ref 0–6)
ERYTHROCYTE [DISTWIDTH] IN BLOOD BY AUTOMATED COUNT: 17.9 % (ref 11.6–15.1)
GFR SERPL CREATININE-BSD FRML MDRD: 84 ML/MIN/1.73SQ M
GLUCOSE SERPL-MCNC: 110 MG/DL (ref 65–140)
GLUCOSE SERPL-MCNC: 113 MG/DL (ref 65–140)
GLUCOSE SERPL-MCNC: 120 MG/DL (ref 65–140)
GLUCOSE SERPL-MCNC: 157 MG/DL (ref 65–140)
GLUCOSE SERPL-MCNC: 161 MG/DL (ref 65–140)
GLUCOSE UR STRIP-MCNC: ABNORMAL MG/DL
GLUCOSE UR STRIP-MCNC: NEGATIVE MG/DL
HCT VFR BLD AUTO: 27.4 % (ref 36.5–49.3)
HGB BLD-MCNC: 8.8 G/DL (ref 12–17)
HGB UR QL STRIP.AUTO: NEGATIVE
IMM GRANULOCYTES # BLD AUTO: 0.06 THOUSAND/UL (ref 0–0.2)
IMM GRANULOCYTES NFR BLD AUTO: 1 % (ref 0–2)
KETONES UR STRIP-MCNC: NEGATIVE MG/DL
LEUKOCYTE ESTERASE UR QL STRIP: NEGATIVE
LYMPHOCYTES # BLD AUTO: 2.37 THOUSANDS/ÂΜL (ref 0.6–4.47)
LYMPHOCYTES NFR BLD AUTO: 27 % (ref 14–44)
MCH RBC QN AUTO: 30.7 PG (ref 26.8–34.3)
MCHC RBC AUTO-ENTMCNC: 32.1 G/DL (ref 31.4–37.4)
MCV RBC AUTO: 96 FL (ref 82–98)
MONOCYTES # BLD AUTO: 0.48 THOUSAND/ÂΜL (ref 0.17–1.22)
MONOCYTES NFR BLD AUTO: 5 % (ref 4–12)
MTX SERPL-SCNC: 0.45 UMOL/L
MTX SERPL-SCNC: 15.5 UMOL/L
MTX SERPL-SCNC: 4.3 UMOL/L
NEUTROPHILS # BLD AUTO: 5.86 THOUSANDS/ÂΜL (ref 1.85–7.62)
NEUTS SEG NFR BLD AUTO: 65 % (ref 43–75)
NITRITE UR QL STRIP: NEGATIVE
NON-SQ EPI CELLS URNS QL MICRO: ABNORMAL /HPF
NON-SQ EPI CELLS URNS QL MICRO: NORMAL /HPF
NRBC BLD AUTO-RTO: 0 /100 WBCS
PH UR STRIP.AUTO: 7.5 [PH]
PH UR STRIP.AUTO: 8 [PH]
PLATELET # BLD AUTO: 267 THOUSANDS/UL (ref 149–390)
PMV BLD AUTO: 10.3 FL (ref 8.9–12.7)
POTASSIUM SERPL-SCNC: 3.5 MMOL/L (ref 3.5–5.3)
PROT UR STRIP-MCNC: NEGATIVE MG/DL
RBC # BLD AUTO: 2.87 MILLION/UL (ref 3.88–5.62)
RBC #/AREA URNS AUTO: ABNORMAL /HPF
RBC #/AREA URNS AUTO: NORMAL /HPF
SODIUM SERPL-SCNC: 143 MMOL/L (ref 135–147)
SP GR UR STRIP.AUTO: 1 (ref 1–1.03)
SP GR UR STRIP.AUTO: 1 (ref 1–1.03)
SP GR UR STRIP.AUTO: 1.01 (ref 1–1.03)
SP GR UR STRIP.AUTO: 1.01 (ref 1–1.03)
UROBILINOGEN UR STRIP-ACNC: <2 MG/DL
WBC # BLD AUTO: 8.95 THOUSAND/UL (ref 4.31–10.16)
WBC #/AREA URNS AUTO: ABNORMAL /HPF
WBC #/AREA URNS AUTO: NORMAL /HPF

## 2025-06-19 PROCEDURE — 99232 SBSQ HOSP IP/OBS MODERATE 35: CPT | Performed by: INTERNAL MEDICINE

## 2025-06-19 PROCEDURE — 81001 URINALYSIS AUTO W/SCOPE: CPT | Performed by: PHYSICIAN ASSISTANT

## 2025-06-19 PROCEDURE — 80204 DRUG ASSAY METHOTREXATE: CPT | Performed by: INTERNAL MEDICINE

## 2025-06-19 PROCEDURE — 85025 COMPLETE CBC W/AUTO DIFF WBC: CPT

## 2025-06-19 PROCEDURE — 82948 REAGENT STRIP/BLOOD GLUCOSE: CPT

## 2025-06-19 PROCEDURE — 80048 BASIC METABOLIC PNL TOTAL CA: CPT

## 2025-06-19 RX ORDER — QUETIAPINE FUMARATE 25 MG/1
12.5 TABLET, FILM COATED ORAL 2 TIMES DAILY
Qty: 90 TABLET | Refills: 1 | OUTPATIENT
Start: 2025-06-19

## 2025-06-19 RX ORDER — LEUCOVORIN CALCIUM 25 MG/1
25 TABLET ORAL EVERY 6 HOURS
Status: COMPLETED | OUTPATIENT
Start: 2025-06-20 | End: 2025-06-22

## 2025-06-19 RX ORDER — SODIUM CHLORIDE 9 MG/ML
20 INJECTION, SOLUTION INTRAVENOUS ONCE AS NEEDED
Status: DISCONTINUED | OUTPATIENT
Start: 2025-06-19 | End: 2025-06-23 | Stop reason: HOSPADM

## 2025-06-19 RX ORDER — LEUCOVORIN CALCIUM 25 MG/1
25 TABLET ORAL EVERY 6 HOURS
Status: DISCONTINUED | OUTPATIENT
Start: 2025-06-19 | End: 2025-06-19

## 2025-06-19 RX ORDER — ALLOPURINOL 300 MG/1
300 TABLET ORAL DAILY
Qty: 90 TABLET | Refills: 1 | OUTPATIENT
Start: 2025-06-19

## 2025-06-19 RX ORDER — ACETAMINOPHEN 325 MG/1
650 TABLET ORAL ONCE
Status: COMPLETED | OUTPATIENT
Start: 2025-06-19 | End: 2025-06-19

## 2025-06-19 RX ADMIN — ALLOPURINOL 300 MG: 300 TABLET ORAL at 08:48

## 2025-06-19 RX ADMIN — SENNOSIDES 17.2 MG: 8.6 TABLET, FILM COATED ORAL at 13:00

## 2025-06-19 RX ADMIN — RITUXIMAB 800 MG: 10 INJECTION, SOLUTION INTRAVENOUS at 11:20

## 2025-06-19 RX ADMIN — DOCUSATE SODIUM 100 MG: 100 CAPSULE, LIQUID FILLED ORAL at 08:48

## 2025-06-19 RX ADMIN — DOCUSATE SODIUM 100 MG: 100 CAPSULE, LIQUID FILLED ORAL at 17:32

## 2025-06-19 RX ADMIN — DEXTROSE MONOHYDRATE 25 MG: 50 INJECTION, SOLUTION INTRAVENOUS at 13:49

## 2025-06-19 RX ADMIN — QUETIAPINE FUMARATE 12.5 MG: 25 TABLET ORAL at 13:00

## 2025-06-19 RX ADMIN — INSULIN GLARGINE 10 UNITS: 100 INJECTION, SOLUTION SUBCUTANEOUS at 08:48

## 2025-06-19 RX ADMIN — DEXTROSE MONOHYDRATE 25 MG: 50 INJECTION, SOLUTION INTRAVENOUS at 07:42

## 2025-06-19 RX ADMIN — QUETIAPINE FUMARATE 12.5 MG: 25 TABLET ORAL at 20:05

## 2025-06-19 RX ADMIN — ENOXAPARIN SODIUM 40 MG: 40 INJECTION SUBCUTANEOUS at 08:48

## 2025-06-19 RX ADMIN — PANTOPRAZOLE SODIUM 40 MG: 40 TABLET, DELAYED RELEASE ORAL at 05:13

## 2025-06-19 RX ADMIN — INSULIN LISPRO 1 UNITS: 100 INJECTION, SOLUTION INTRAVENOUS; SUBCUTANEOUS at 20:55

## 2025-06-19 RX ADMIN — DEXTROSE MONOHYDRATE 25 MG: 50 INJECTION, SOLUTION INTRAVENOUS at 19:51

## 2025-06-19 RX ADMIN — ACETAMINOPHEN 650 MG: 325 TABLET ORAL at 08:48

## 2025-06-19 RX ADMIN — DEXTROSE MONOHYDRATE 25 MG: 50 INJECTION, SOLUTION INTRAVENOUS at 02:30

## 2025-06-19 RX ADMIN — INSULIN LISPRO 1 UNITS: 100 INJECTION, SOLUTION INTRAVENOUS; SUBCUTANEOUS at 17:31

## 2025-06-19 RX ADMIN — SODIUM BICARBONATE 100 ML/HR: 84 INJECTION, SOLUTION INTRAVENOUS at 18:16

## 2025-06-19 RX ADMIN — DIPHENHYDRAMINE HYDROCHLORIDE 25 MG: 50 INJECTION INTRAMUSCULAR; INTRAVENOUS at 10:10

## 2025-06-19 RX ADMIN — ATORVASTATIN CALCIUM 20 MG: 20 TABLET, FILM COATED ORAL at 08:48

## 2025-06-19 NOTE — PROGRESS NOTES
Medical Oncology/Hematology Progress Note  Alexis Dennison, male, 64 y.o., 1960,  PPHP 916/[facility] 916-01, [id number]     Assessment and Plan  1. Primary central nervous system (CNS) lymphoma,  double expresser    IN SUMMARY, DLBCL NOS, non-germinal center, Ki-67 60-70, BCL6 rearrangement)   Diagnosed in 4/ 2025. Established with Dr. Phan      On Q-14 days High-dose IV Methotrexate   C1 (4/18) 8 g/m²  C2 (5/2) 3 g/m², dose-reduced due to LETY, delayed x1 day due to E. coli bacteremia   C3 (5/21) 3 g/m²   C4 : 3 g/m² ; given on 6/17/25   Scheduled Start Date/Time: 06/17/25 1430 End Date/Time: 06/17/25 1909 after 1 doses     S/p Rituximab x 5 doses 4/17/25 - 5/23/25 ; next Rituxan planned on 6/19/25 ; vitals, labs, exams and ROS all unremarkable, okay to start Rituxan as planned,     Patient had prescription for temozolomide total to 90 mg a day on days 7 through 11 (6/23 - 6/27)      PLAN   - urine alkalization per protocol ; IV Na Bicarb, Q6H UA , keep PH > 7       pH, UA 6/19/25 0513  7.5     - daily CBC and CMP (6/19 unremarkable vitals and labs)   - MTX level 24H, 48H, etc - in process ; goal before discharge < 0.05   - Q6H Leucovorin 24 hours post MTX   - Outpatient MRI Brain is scheduled 6/26/25   - on ppx allopurinol       2.  Recent history of UTI   UA 6/12/2025 large leukocytes and positive nitrites  He noted occasional but not all the time dysuria  He is afebrile WBC not elevated  urine culture 6/16   Urine Culture No Growth <1000 cfu/mL      S/p Cipro 6/12 - 6/18 ; now off it and denies any urinary sx     __________________________________________    Outpatient follow up plan: With Dr. Phan  Communication with patient/family:  I did update the patient   Communication with team:  Did communicate with Dr. Langley, primary team.   I did review this patient with Dr. Phan and she is in agreement with this plan.              Subjective:  Seen and examined today, no new symptoms. No overnight events.  Ate breakfast. Urinating with no symptoms. Agree to plan of starting Rituxan. MTX level still pending . Labs stable.       Objective:     Medication Administration - last 24 hours from 06/18/2025 0837 to 06/19/2025 0837         Date/Time Order Dose Route Action Action by     06/18/2025 0848 EDT enoxaparin (LOVENOX) subcutaneous injection 40 mg 40 mg Subcutaneous Given Rupali Colmenares RN     06/18/2025 0848 EDT allopurinol (ZYLOPRIM) tablet 300 mg 300 mg Oral Given Rupali Colmenares RN     06/18/2025 0848 EDT atorvastatin (LIPITOR) tablet 20 mg 20 mg Oral Given Rupali Colmenares RN     06/18/2025 0848 EDT ciprofloxacin (CIPRO) tablet 500 mg 500 mg Oral Given Rupali Colmenares RN     06/18/2025 1733 EDT docusate sodium (COLACE) capsule 100 mg 100 mg Oral Given Rupali Colmenares RN     06/18/2025 0848 EDT docusate sodium (COLACE) capsule 100 mg 100 mg Oral Given Rupali Colmenares RN     06/19/2025 0513 EDT pantoprazole (PROTONIX) EC tablet 40 mg 40 mg Oral Given Arsh Roblero, ADELINE     06/18/2025 2016 EDT QUEtiapine (SEROquel) tablet 12.5 mg 12.5 mg Oral Given Arsh Roblero, ADELINE     06/18/2025 1141 EDT QUEtiapine (SEROquel) tablet 12.5 mg 12.5 mg Oral Given Rupali Colmenares RN     06/18/2025 1141 EDT senna (SENOKOT) tablet 17.2 mg 17.2 mg Oral Given Rupali Colmenares RN     06/19/2025 0742 EDT leucovorin 25 mg in dextrose 5 % 50 mL IVPB 25 mg Intravenous New Bag Marcus Queen, ADELINE     06/19/2025 0230 EDT leucovorin 25 mg in dextrose 5 % 50 mL IVPB 25 mg Intravenous New Bag Arsh Roblero, ADELINE     06/18/2025 1946 EDT leucovorin 25 mg in dextrose 5 % 50 mL IVPB 25 mg Intravenous New Bag Arsh Roblero, RN     06/18/2025 1400 EDT leucovorin 25 mg in dextrose 5 % 50 mL IVPB 25 mg Intravenous New Bag Rupali Colmenares RN     06/18/2025 0902 EDT leucovorin 25 mg in dextrose 5 % 50 mL IVPB -- Intravenous Canceled Entry Elly Marti RN     06/18/2025 0902 EDT sodium bicarbonate 100 mEq in dextrose 5 % 1,000 mL infusion 100 mL/hr Intravenous New Bag Rupali  "ADELINE Colmenares     06/18/2025 0901 EDT sodium bicarbonate 100 mEq in dextrose 5 % 1,000 mL infusion 0 mL/hr Intravenous Stopped Rupali Colmenares RN     06/19/2025 0745 EDT insulin lispro (HumALOG/ADMELOG) 100 units/mL subcutaneous injection 1-6 Units -- Subcutaneous Not Given Lindsey Klein RN     06/18/2025 2151 EDT insulin lispro (HumALOG/ADMELOG) 100 units/mL subcutaneous injection 1-6 Units 4 Units Subcutaneous Given Arsh Roblero RN     06/18/2025 1733 EDT insulin lispro (HumALOG/ADMELOG) 100 units/mL subcutaneous injection 1-6 Units 3 Units Subcutaneous Given Rupali Colmenares RN     06/18/2025 1138 EDT insulin lispro (HumALOG/ADMELOG) 100 units/mL subcutaneous injection 1-6 Units -- Subcutaneous Not Given Rupali Colmenares RN     06/18/2025 0841 EDT insulin lispro (HumALOG/ADMELOG) 100 units/mL subcutaneous injection 1-6 Units -- Subcutaneous Not Given Rupali Colmenares RN     06/18/2025 0848 EDT insulin glargine (LANTUS) subcutaneous injection 10 Units 0.1 mL 10 Units Subcutaneous Given Rupali Colmenarse RN            /84   Pulse 67   Temp 98.2 °F (36.8 °C)   Resp 18   Ht 5' 10\" (1.778 m)   Wt 80.4 kg (177 lb 4 oz)   SpO2 100%   BMI 25.43 kg/m²       Physical Exam  - GEN: Flat affect ; appears well, alert and oriented x 3, pleasant and cooperative, in no acute distress  - HEENT: Anicteric, mucous membranes moist, PERRL and EOMI   - NECK: No lymphadenopathy, JVD or carotid bruits   - HEART: RRR, normal S1 and S2, no murmurs, clicks, gallops or rubs   - LUNGS: Clear to auscultation bilaterally; no wheezes, rales, or rhonchi  - ABDOMEN: Normal bowel sounds, soft, no tenderness, no distention, no organomegaly or masses felt on exam.   - EXTREMITIES: Peripheral pulses normal; no clubbing, cyanosis; no edema   - NEURO: Slow speech/mild memory deficit noted, no focal findings, CN II-XII are grossly intact.   - Musculoskeletal: 5/5 strength, normal ROM, no swollen or erythematous joints.   - SKIN: Normal without suspicious " lesions on exposed skin    Recent Results (from the past 48 hours)   Urinalysis with microscopic    Collection Time: 06/17/25  9:59 AM   Result Value Ref Range    Color, UA Light Yellow     Clarity, UA Clear     Specific Gravity, UA 1.010 1.003 - 1.030    pH, UA 8.0 4.5, 5.0, 5.5, 6.0, 6.5, 7.0, 7.5, 8.0    Leukocytes, UA Negative Negative    Nitrite, UA Negative Negative    Protein, UA Negative Negative mg/dl    Glucose, UA Negative Negative mg/dl    Ketones, UA Negative Negative mg/dl    Urobilinogen, UA <2.0 <2.0 mg/dl mg/dl    Bilirubin, UA Negative Negative    Occult Blood, UA Negative Negative    RBC, UA None Seen None Seen, 1-2 /hpf    WBC, UA None Seen None Seen, 1-2 /hpf    Epithelial Cells None Seen None Seen, Occasional /hpf    Bacteria, UA None Seen None Seen, Occasional /hpf   Fingerstick Glucose (POCT)    Collection Time: 06/17/25 11:54 AM   Result Value Ref Range    POC Glucose 137 65 - 140 mg/dl   Urinalysis with microscopic    Collection Time: 06/17/25  3:55 PM   Result Value Ref Range    Color, UA Light Yellow     Clarity, UA Clear     Specific Gravity, UA 1.007 1.003 - 1.030    pH, UA 8.0 4.5, 5.0, 5.5, 6.0, 6.5, 7.0, 7.5, 8.0    Leukocytes, UA Negative Negative    Nitrite, UA Negative Negative    Protein, UA Trace (A) Negative mg/dl    Glucose,  (1/5%) (A) Negative mg/dl    Ketones, UA Negative Negative mg/dl    Urobilinogen, UA <2.0 <2.0 mg/dl mg/dl    Bilirubin, UA Negative Negative    Occult Blood, UA Negative Negative    RBC, UA None Seen None Seen, 1-2 /hpf    WBC, UA None Seen None Seen, 1-2 /hpf    Epithelial Cells None Seen None Seen, Occasional /hpf    Bacteria, UA None Seen None Seen, Occasional /hpf   Fingerstick Glucose (POCT)    Collection Time: 06/17/25  4:01 PM   Result Value Ref Range    POC Glucose 297 (H) 65 - 140 mg/dl   Fingerstick Glucose (POCT)    Collection Time: 06/17/25 10:07 PM   Result Value Ref Range    POC Glucose 422 (HH) 65 - 140 mg/dl   Urinalysis with  microscopic    Collection Time: 06/17/25 10:12 PM   Result Value Ref Range    Color, UA Light Yellow     Clarity, UA Clear     Specific Gravity, UA 1.018 1.003 - 1.030    pH, UA 8.0 4.5, 5.0, 5.5, 6.0, 6.5, 7.0, 7.5, 8.0    Leukocytes, UA (A) Negative     Elevated glucose may cause decreased leukocyte values. See urine microscopic for UWBC result    Nitrite, UA Negative Negative    Protein, UA 50 (1+) (A) Negative mg/dl    Glucose, UA >=1000 (1%) (A) Negative mg/dl    Ketones, UA Negative Negative mg/dl    Urobilinogen, UA <2.0 <2.0 mg/dl mg/dl    Bilirubin, UA Negative Negative    Occult Blood, UA Negative Negative    RBC, UA None Seen None Seen, 1-2 /hpf    WBC, UA None Seen None Seen, 1-2 /hpf    Epithelial Cells None Seen None Seen, Occasional /hpf    Bacteria, UA None Seen None Seen, Occasional /hpf   Fingerstick Glucose (POCT)    Collection Time: 06/17/25 11:17 PM   Result Value Ref Range    POC Glucose 405 (HH) 65 - 140 mg/dl   Fingerstick Glucose (POCT)    Collection Time: 06/18/25 12:26 AM   Result Value Ref Range    POC Glucose 356 (H) 65 - 140 mg/dl   Urinalysis with microscopic    Collection Time: 06/18/25  5:21 AM   Result Value Ref Range    Color, UA Light Yellow     Clarity, UA Clear     Specific Gravity, UA 1.008 1.003 - 1.030    pH, UA 8.0 4.5, 5.0, 5.5, 6.0, 6.5, 7.0, 7.5, 8.0    Leukocytes, UA Negative Negative    Nitrite, UA Negative Negative    Protein, UA Trace (A) Negative mg/dl    Glucose,  (3/10%) (A) Negative mg/dl    Ketones, UA Negative Negative mg/dl    Urobilinogen, UA <2.0 <2.0 mg/dl mg/dl    Bilirubin, UA Negative Negative    Occult Blood, UA Negative Negative    RBC, UA None Seen None Seen, 1-2 /hpf    WBC, UA None Seen None Seen, 1-2 /hpf    Epithelial Cells None Seen None Seen, Occasional /hpf    Bacteria, UA None Seen None Seen, Occasional /hpf   CBC    Collection Time: 06/18/25  5:22 AM   Result Value Ref Range    WBC 15.64 (H) 4.31 - 10.16 Thousand/uL    RBC 2.80 (L) 3.88  - 5.62 Million/uL    Hemoglobin 8.8 (L) 12.0 - 17.0 g/dL    Hematocrit 25.6 (L) 36.5 - 49.3 %    MCV 91 82 - 98 fL    MCH 31.4 26.8 - 34.3 pg    MCHC 34.4 31.4 - 37.4 g/dL    RDW 17.2 (H) 11.6 - 15.1 %    Platelets 302 149 - 390 Thousands/uL    MPV 10.4 8.9 - 12.7 fL   Basic metabolic panel    Collection Time: 06/18/25  5:22 AM   Result Value Ref Range    Sodium 140 135 - 147 mmol/L    Potassium 3.9 3.5 - 5.3 mmol/L    Chloride 102 96 - 108 mmol/L    CO2 31 21 - 32 mmol/L    ANION GAP 7 4 - 13 mmol/L    BUN 15 5 - 25 mg/dL    Creatinine 1.04 0.60 - 1.30 mg/dL    Glucose 119 65 - 140 mg/dL    Calcium 8.6 8.4 - 10.2 mg/dL    eGFR 74 ml/min/1.73sq m   Magnesium    Collection Time: 06/18/25  5:22 AM   Result Value Ref Range    Magnesium 2.2 1.9 - 2.7 mg/dL   Phosphorus    Collection Time: 06/18/25  5:22 AM   Result Value Ref Range    Phosphorus 2.7 2.3 - 4.1 mg/dL   Fingerstick Glucose (POCT)    Collection Time: 06/18/25  8:31 AM   Result Value Ref Range    POC Glucose 126 65 - 140 mg/dl   Fingerstick Glucose (POCT)    Collection Time: 06/18/25 11:27 AM   Result Value Ref Range    POC Glucose 130 65 - 140 mg/dl   Urinalysis with microscopic    Collection Time: 06/18/25 12:09 PM   Result Value Ref Range    Color, UA Colorless     Clarity, UA Clear     Specific Gravity, UA 1.006 1.003 - 1.030    pH, UA 8.0 4.5, 5.0, 5.5, 6.0, 6.5, 7.0, 7.5, 8.0    Leukocytes, UA Negative Negative    Nitrite, UA Negative Negative    Protein, UA Negative Negative mg/dl    Glucose, UA Negative Negative mg/dl    Ketones, UA Negative Negative mg/dl    Urobilinogen, UA <2.0 <2.0 mg/dl mg/dl    Bilirubin, UA Negative Negative    Occult Blood, UA Negative Negative    RBC, UA None Seen None Seen, 1-2 /hpf    WBC, UA None Seen None Seen, 1-2 /hpf    Epithelial Cells None Seen None Seen, Occasional /hpf    Bacteria, UA None Seen None Seen, Occasional /hpf   Urinalysis with microscopic    Collection Time: 06/18/25  4:20 PM   Result Value Ref Range     Color, UA Colorless     Clarity, UA Clear     Specific Gravity, UA 1.003 1.003 - 1.030    pH, UA 7.5 4.5, 5.0, 5.5, 6.0, 6.5, 7.0, 7.5, 8.0    Leukocytes, UA Negative Negative    Nitrite, UA Negative Negative    Protein, UA Negative Negative mg/dl    Glucose,  (3/20%) (A) Negative mg/dl    Ketones, UA Negative Negative mg/dl    Urobilinogen, UA <2.0 <2.0 mg/dl mg/dl    Bilirubin, UA Negative Negative    Occult Blood, UA Negative Negative    RBC, UA None Seen None Seen, 1-2 /hpf    WBC, UA None Seen None Seen, 1-2 /hpf    Epithelial Cells None Seen None Seen, Occasional /hpf    Bacteria, UA None Seen None Seen, Occasional /hpf   Fingerstick Glucose (POCT)    Collection Time: 06/18/25  4:31 PM   Result Value Ref Range    POC Glucose 249 (H) 65 - 140 mg/dl   Fingerstick Glucose (POCT)    Collection Time: 06/18/25  9:03 PM   Result Value Ref Range    POC Glucose 277 (H) 65 - 140 mg/dl   Urinalysis with microscopic    Collection Time: 06/18/25  9:56 PM   Result Value Ref Range    Color, UA Colorless     Clarity, UA Clear     Specific Gravity, UA 1.014 1.003 - 1.030    pH, UA 8.0 4.5, 5.0, 5.5, 6.0, 6.5, 7.0, 7.5, 8.0    Leukocytes, UA (A) Negative     Elevated glucose may cause decreased leukocyte values. See urine microscopic for UWBC result    Nitrite, UA Negative Negative    Protein, UA Negative Negative mg/dl    Glucose, UA >=1000 (1%) (A) Negative mg/dl    Ketones, UA Negative Negative mg/dl    Urobilinogen, UA <2.0 <2.0 mg/dl mg/dl    Bilirubin, UA Negative Negative    Occult Blood, UA Negative Negative    RBC, UA None Seen None Seen, 1-2 /hpf    WBC, UA None Seen None Seen, 1-2 /hpf    Epithelial Cells None Seen None Seen, Occasional /hpf    Bacteria, UA None Seen None Seen, Occasional /hpf   Basic metabolic panel    Collection Time: 06/19/25  5:12 AM   Result Value Ref Range    Sodium 143 135 - 147 mmol/L    Potassium 3.5 3.5 - 5.3 mmol/L    Chloride 103 96 - 108 mmol/L    CO2 33 (H) 21 - 32 mmol/L    ANION  GAP 7 4 - 13 mmol/L    BUN 13 5 - 25 mg/dL    Creatinine 0.94 0.60 - 1.30 mg/dL    Glucose 120 65 - 140 mg/dL    Calcium 8.5 8.4 - 10.2 mg/dL    eGFR 84 ml/min/1.73sq m   CBC and differential    Collection Time: 06/19/25  5:12 AM   Result Value Ref Range    WBC 8.95 4.31 - 10.16 Thousand/uL    RBC 2.87 (L) 3.88 - 5.62 Million/uL    Hemoglobin 8.8 (L) 12.0 - 17.0 g/dL    Hematocrit 27.4 (L) 36.5 - 49.3 %    MCV 96 82 - 98 fL    MCH 30.7 26.8 - 34.3 pg    MCHC 32.1 31.4 - 37.4 g/dL    RDW 17.9 (H) 11.6 - 15.1 %    MPV 10.3 8.9 - 12.7 fL    Platelets 267 149 - 390 Thousands/uL    nRBC 0 /100 WBCs    Segmented % 65 43 - 75 %    Immature Grans % 1 0 - 2 %    Lymphocytes % 27 14 - 44 %    Monocytes % 5 4 - 12 %    Eosinophils Relative 1 0 - 6 %    Basophils Relative 1 0 - 1 %    Absolute Neutrophils 5.86 1.85 - 7.62 Thousands/µL    Absolute Immature Grans 0.06 0.00 - 0.20 Thousand/uL    Absolute Lymphocytes 2.37 0.60 - 4.47 Thousands/µL    Absolute Monocytes 0.48 0.17 - 1.22 Thousand/µL    Eosinophils Absolute 0.12 0.00 - 0.61 Thousand/µL    Basophils Absolute 0.06 0.00 - 0.10 Thousands/µL   Urinalysis with microscopic    Collection Time: 06/19/25  5:13 AM   Result Value Ref Range    Color, UA Colorless     Clarity, UA Clear     Specific Gravity, UA 1.009 1.003 - 1.030    pH, UA 7.5 4.5, 5.0, 5.5, 6.0, 6.5, 7.0, 7.5, 8.0    Leukocytes, UA Negative Negative    Nitrite, UA Negative Negative    Protein, UA Negative Negative mg/dl    Glucose, UA 70 (7/100%) (A) Negative mg/dl    Ketones, UA Negative Negative mg/dl    Urobilinogen, UA <2.0 <2.0 mg/dl mg/dl    Bilirubin, UA Negative Negative    Occult Blood, UA Negative Negative    RBC, UA None Seen None Seen, 1-2 /hpf    WBC, UA 1-2 None Seen, 1-2 /hpf    Epithelial Cells None Seen None Seen, Occasional /hpf    Bacteria, UA None Seen None Seen, Occasional /hpf   Fingerstick Glucose (POCT)    Collection Time: 06/19/25  7:57 AM   Result Value Ref Range    POC Glucose 113 65 -  140 mg/dl       NM PET CT skull base to mid thigh  Result Date: 6/16/2025  Narrative: PET/CT SCAN INDICATION: C83.390: Primary central nervous system lymphoma, restaging MODIFIER: PS COMPARISON: CT of abdomen and pelvis dated 5/18/2025, MRI of the brain, cervical spine, thoracic spine, and lumbar spine dated 4/19/25, CT angiography of the chest, abdomen, and pelvis dated 3/29/2025 CELL TYPE: Involved by pt's large B-cell lymphoma (bx 4/13/25 CSF +) TECHNIQUE:   8.2 mCi F-18-FDG administered IV. Multiplanar attenuation corrected and non attenuation corrected PET images are available for interpretation, and contiguous, low dose, axial CT sections were obtained from the skull vertex through the femurs. Intravenous contrast material was not utilized. This examination, like all CT scans performed in the [facility], was performed utilizing techniques to minimize radiation dose exposure, including the use of iterative reconstruction and automated exposure control. Fasting serum glucose: 115 mg/dl FINDINGS: VISUALIZED BRAIN: On PET image 37 there is subtle asymmetric focal activity involving the left cerebral hemisphere slightly posterior to the left frontal horn which is difficult to correlate on low-dose unenhanced CT but measures approximately 1.5 cm on PET imaging with max SUV of 7.7; this finding is in the expected location patient's known enhancing intracranial mass which is better characterized on MRI of the brain dated 4/20/25 and could reflect mild hypermetabolic malignant activity versus asymmetric activity involving the left basal ganglia which is in this region. Patient's known intracranial malignancy is better characterized on MRI of the brain given background physiologic brain activity and continued follow-up with MRI of the brain is recommended. HEAD/NECK: On image 20 there is mild FDG activity associated with cutaneous/subcutaneous soft tissue density involving the left frontal scalp which  appears superficial to a siomara hole in the left frontal calvarium extending from images 21 through 24 of series 3. On image 20 of series 3 this finding measures approximately 1.3 cm in length with max SUV of 3.9 and possibly reflects inflammatory activity although correlation is recommended to exclude the possibility of cutaneous/subcutaneous malignancy of low metabolic  activity. CT images: Essentially stable prominence to the lateral ventricles which is better characterized on prior dedicated MRI of the brain. Stable asymmetric nodular enlargement of the left thyroid gland which was better characterized on thyroid ultrasound dated 6//25 with recommendation for tissue sampling was made. On image 93 of series 3 there is a nonspecific mildly prominent common nonpathologic in size left perithyroidal/lower cervical lymph node measuring 6 mm in short axis. CHEST: No FDG avid soft tissue lesions are seen. CT images: Trace gynecomastia. Atherosclerotic vascular calcifications including those of the coronary arteries are noted. Tiny calcified right middle lobe granuloma. On image 129 of series 3 there is a new 6 mm left lower lobe lung nodule, possibly related to adjacent subtle patchy likely inflammatory groundglass airspace disease although given known malignancy, short interval follow-up with CT of chest in 3 months is recommended to ensure stability. ABDOMEN: Fairly diffuse nonspecific nonuniform bowel activity, possibly physiologic which limits evaluation for underlying hypermetabolic bowel malignancy. CT images: Stable right hepatic hypodensity which was better characterized on prior MRI of abdomen where it was characterized as a cyst. Atherosclerotic vascular calcifications are noted. PELVIS: No FDG avid soft tissue lesions are seen. CT images: Enlarged prostate gland. Mild nonspecific diffuse mural thickening of the urinary bladder wall which is difficult to evaluate secondary to under distention. OSSEOUS  STRUCTURES: No FDG avid lesions are seen. CT images: No significant findings.     Impression: 1.  Subtle asymmetric focal FDG activity involving the left cerebral hemisphere in the expected location of patient's known enhancing intracranial mass which could reflect mild hypermetabolic malignancy versus asymmetric physiologic activity involving the left basal ganglia. Patient's known intracranial malignancy is better characterized on MRI of the brain and continued follow-up with MRI of the brain is recommended as clinically indicated. 2.  Mild nonspecific FDG activity associated with cutaneous/subcutaneous soft tissue density involving the left frontal scalp which appears superficial to a siomara hole in the left frontal calvarium, possibly reflecting inflammatory activity. Correlation with direct visualization is recommended to exclude the possibility of cutaneous/subcutaneous malignancy of low metabolic activity. 3.  New 6 mm left lower lobe lung nodule, possibly inflammatory. Short interval follow-up with CT of chest in 3 months is recommended to ensure stability. 4.  Please see above for details and additional findings. The study was marked in EPIC for significant notification. The study was marked in Epic for follow-up. Workstation performed: [id number]     US thyroid  Result Date: 6/11/2025  Narrative: THYROID ULTRASOUND INDICATION: E04.1: Nontoxic single thyroid nodule. COMPARISON: CTA head and neck 3/24/2025 TECHNIQUE: Ultrasound of the thyroid was performed with a high frequency linear transducer in transverse and sagittal planes including volumetric imaging sweeps as well as traditional still imaging technique. FINDINGS: Thyroid texture: Thyroid is mildly inhomogeneous and hypervascular. Right lobe: 5.0 x 1.5 x 1.9 cm. Volume 6.9 mL Left lobe: 6.0 x 3.1 x 2.8 cm. Volume 24.9 mL Isthmus: 0.3 cm. Nodule #1. Image 33. LEFT anterior mid to lower pole nodule. The length in depth is difficult to accurately determine  as it is abutting an additional posterior nodule, best estimate measuring 3.1 x 1.4 x 2.1 cm (series 200 image 105 of 200). No prior ultrasound. COMPOSITION: 2 points, solid or almost completely solid. ECHOGENICITY: 1 point, hyperechoic or isoechoic. SHAPE: 0 points, wider-than-tall. MARGIN: 0 points, ill-defined. ECHOGENIC FOCI: 0 points, none or large comet-tail artifacts. TI-RADS Classification: TR 3 (3 points), FNA if >/= 2.5 cm. Follow if >/= 1.5 cm. Nodule #2. Image 36. LEFT posterior midgland nodule measuring 2.3 x 1.5 x 2.2 cm. No prior ultrasound. COMPOSITION: 2 points, solid or almost completely solid. ECHOGENICITY: 1 point, hyperechoic or isoechoic. SHAPE: 0 points, wider-than-tall. MARGIN: 0 points, smooth. ECHOGENIC FOCI: 0 points, none or large comet-tail artifacts. TI-RADS Classification: TR 3 (3 points), FNA if >/= 2.5 cm. Follow if >/= 1.5 cm. There are additional nodules of lesser size and/or TI-RADS score. These do not necessitate additional evaluation based on ACR criteria.     Impression: Asymmetric left thyroid enlargement with associated nodules corresponding to previous CT findings. The following meet current ACR criteria for recommending ultrasound guided biopsy: 1. LEFT anterior mid to lower pole 3.1 cm nodule (Image 33). 2. LEFT posterior midgland nodule measuring 2.3 cm (Image 36). Reference: ACR Thyroid Imaging, Reporting and Data System (TI-RADS): White Paper of the ACR TI-RADS Committee. J AM Winifred Radiol 2017;14:587-595. Additional recommendations based on American Thyroid Association 2015 guidelines. The study was marked in EPIC for significant notification and follow-up. Workstation performed: [id number]     CT abdomen pelvis w contrast  Result Date: 5/19/2025  Narrative: CT ABDOMEN AND PELVIS WITH IV CONTRAST INDICATION: recurrent bacteremia, look for source. Blood culture and urine culture positive for gram-negative rods. COMPARISON: April 30, 2025. TECHNIQUE: CT examination of the  abdomen and pelvis was performed. Multiplanar 2D reformatted images were created from the source data. This examination, like all CT scans performed in the [facility], was performed utilizing techniques to minimize radiation dose exposure, including the use of iterative reconstruction and automated exposure control. Radiation dose length product (DLP) for this visit: 390.85 mGy-cm. IV Contrast: 85 mL of iohexol (OMNIPAQUE) Enteric Contrast: Not administered. FINDINGS: ABDOMEN LOWER CHEST: A small calcified granuloma is redemonstrated anteriorly at the right lung base. There is coronary artery disease. A catheter terminates in the right atrium. LIVER/BILIARY TREE: There is a 13 x 9 mm cyst posteriorly in the right lobe of the liver, similar to the prior study. GALLBLADDER: No calcified gallstones. No pericholecystic inflammatory change. SPLEEN: Unremarkable. PANCREAS: Unremarkable. ADRENAL GLANDS: Unremarkable. KIDNEYS/URETERS: Mild bilateral nonspecific perinephric stranding, increased somewhat compared with April 30, 2025. No hydronephrosis bilaterally. STOMACH AND BOWEL: No bowel obstruction. No pericolonic inflammatory change. APPENDIX: Normal. ABDOMINOPELVIC CAVITY: There is a small amount of free fluid, best demonstrated in the right lower quadrant and paracolic gutters. No pneumoperitoneum. VESSELS: Atherosclerosis. No abdominal aortic aneurysm. PELVIS REPRODUCTIVE ORGANS: Unremarkable for patient's age. URINARY BLADDER: Unremarkable. ABDOMINAL WALL/INGUINAL REGIONS: There is some gas within the subcutaneous fat of the anterior abdominal walls near the level of the umbilicus, left more so than right, possibly related to recent injections. Small fat-containing left inguinal hernia. BONES: No acute fracture or suspicious osseous lesion. Spinal degenerative changes, most notably at L5-S1.     Impression: There is mild bilateral nonspecific perinephric stranding, however, this does appear somewhat  increased compared with April 30, 2025. Correlation with urinalysis and urine culture and sensitivity is recommended. There is a small amount of free fluid. Other nonemergent findings as above. Workstation performed: [id number]       I have personally reviewed labs, imaging studies, and pertinent reports.      This note has been generated by voice recognition software system.  Therefore, there may be spelling, grammar, and or syntax errors. Please contact if questions arise.     Simba Phillips,    Hematology and Medical Oncology - PGY V  [facility]

## 2025-06-19 NOTE — PLAN OF CARE
Problem: PAIN - ADULT  Goal: Verbalizes/displays adequate comfort level or baseline comfort level  Description: Interventions:  - Encourage patient to monitor pain and request assistance  - Assess pain using appropriate pain scale  - Administer analgesics as ordered based on type and severity of pain and evaluate response  - Implement non-pharmacological measures as appropriate and evaluate response  - Consider cultural and social influences on pain and pain management  - Notify physician/advanced practitioner if interventions unsuccessful or patient reports new pain  - Educate patient/family on pain management process including their role and importance of  reporting pain   - Provide non-pharmacologic/complimentary pain relief interventions  Outcome: Progressing     Problem: INFECTION - ADULT  Goal: Absence or prevention of progression during hospitalization  Description: INTERVENTIONS:  - Assess and monitor for signs and symptoms of infection  - Monitor lab/diagnostic results  - Monitor all insertion sites, i.e. indwelling lines, tubes, and drains  - Monitor endotracheal if appropriate and nasal secretions for changes in amount and color  - [place] appropriate cooling/warming therapies per order  - Administer medications as ordered  - Instruct and encourage patient and family to use good hand hygiene technique  - Identify and instruct in appropriate isolation precautions for identified infection/condition  Outcome: Progressing  Goal: Absence of fever/infection during neutropenic period  Description: INTERVENTIONS:  - Monitor WBC  - Perform strict hand hygiene  - Limit to healthy visitors only  - No plants, dried, fresh or silk flowers with carrion in patient room  Outcome: Progressing     Problem: SAFETY ADULT  Goal: Patient will remain free of falls  Description: INTERVENTIONS:  - Educate patient/family on patient safety including physical limitations  - Instruct patient to call for assistance with activity   -  Consider consulting OT/PT to assist with strengthening/mobility based on AM PAC & JH-HLM score  - Consult OT/PT to assist with strengthening/mobility   - Keep Call bell within reach  - Keep bed low and locked with side rails adjusted as appropriate  - Keep care items and personal belongings within reach  - Initiate and maintain comfort rounds  - Make Fall Risk Sign visible to staff  - Offer Toileting every  Hours, in advance of need  - Initiate/Maintain alarm  - Obtain necessary fall risk management equipment:   - Apply yellow socks and bracelet for high fall risk patients  - Consider moving patient to room near nurses station  Outcome: Progressing  Goal: Maintain or return to baseline ADL function  Description: INTERVENTIONS:  -  Assess patient's ability to carry out ADLs; assess patient's baseline for ADL function and identify physical deficits which impact ability to perform ADLs (bathing, care of mouth/teeth, toileting, grooming, dressing, etc.)  - Assess/evaluate cause of self-care deficits   - Assess range of motion  - Assess patient's mobility; develop plan if impaired  - Assess patient's need for assistive devices and provide as appropriate  - Encourage maximum independence but intervene and supervise when necessary  - Involve family in performance of ADLs  - Assess for home care needs following discharge   - Consider OT consult to assist with ADL evaluation and planning for discharge  - Provide patient education as appropriate  - Monitor functional capacity and physical performance, use of AM PAC & JH-HLM   - Monitor gait, balance and fatigue with ambulation    Outcome: Progressing  Goal: Maintains/Returns to pre admission functional level  Description: INTERVENTIONS:  - Perform AM-PAC 6 Click Basic Mobility/ Daily Activity assessment daily.  - Set and communicate daily mobility goal to care team and patient/family/caregiver.   - Collaborate with rehabilitation services on mobility goals if consulted  -  Perform Range of Motion  times a day.  - Reposition patient every  hours.  - Dangle patient  times a day  - Stand patient  times a day  - Ambulate patient  times a day  - Out of bed to chair  times a day   - Out of bed for meals  times a day  - Out of bed for toileting  - Record patient progress and toleration of activity level   Outcome: Progressing     Problem: DISCHARGE PLANNING  Goal: Discharge to home or other facility with appropriate resources  Description: INTERVENTIONS:  - Identify barriers to discharge w/patient and caregiver  - Arrange for needed discharge resources and transportation as appropriate  - Identify discharge learning needs (meds, wound care, etc.)  - Arrange for interpretive services to assist at discharge as needed  - Refer to Case Management Department for coordinating discharge planning if the patient needs post-hospital services based on physician/advanced practitioner order or complex needs related to functional status, cognitive ability, or social support system  Outcome: Progressing     Problem: Knowledge Deficit  Goal: Patient/family/caregiver demonstrates understanding of disease process, treatment plan, medications, and discharge instructions  Description: Complete learning assessment and assess knowledge base.  Interventions:  - Provide teaching at level of understanding  - Provide teaching via preferred learning methods  Outcome: Progressing     Problem: Prexisting or High Potential for Compromised Skin Integrity  Goal: Skin integrity is maintained or improved  Description: INTERVENTIONS:  - Identify patients at risk for skin breakdown  - Assess and monitor skin integrity including under and around medical devices   - Assess and monitor nutrition and hydration status  - Monitor labs  - Assess for incontinence   - Turn and reposition patient  - Assist with mobility/ambulation  - Relieve pressure over antonieta prominences   - Avoid friction and shearing  - Provide appropriate hygiene  as needed including keeping skin clean and dry  - Evaluate need for skin moisturizer/barrier cream  - Collaborate with interdisciplinary team  - Patient/family teaching  - Consider wound care consult    Assess:  - Review [place] scale daily  - Clean and moisturize skin every   - Inspect skin when repositioning, toileting, and assisting with ADLS  - Assess under medical devices such as every   - Assess extremities for adequate circulation and sensation     Bed Management:  - Have minimal linens on bed & keep smooth, unwrinkled  - Change linens as needed when moist or perspiring  - Avoid sitting or lying in one position for more than  hours while in bed?Keep HOB at degrees   - Toileting:  - Offer bedside commode  - Assess for incontinence every   - Use incontinent care products after each incontinent episode such as     Activity:  - Mobilize patient  times a day  - Encourage activity and walks on unit  - Encourage or provide ROM exercises   - Turn and reposition patient every  Hours  - Use appropriate equipment to lift or move patient in bed  - Instruct/ Assist with weight shifting every  when out of bed in chair  - Consider limitation of chair time  hour intervals    Skin Care:  - Avoid use of baby powder, tape, friction and shearing, hot water or constrictive clothing  - Relieve pressure over bony prominences using   - Do not massage red bony areas    Next Steps:  - Teach patient strategies to minimize risks such as   - Consider consults to  interdisciplinary teams such as   Outcome: Progressing

## 2025-06-19 NOTE — PROGRESS NOTES
Progress Note - Hospitalist   Name: Alexis Dennison 64 y.o. male I MRN: [id number]  Unit/Bed#: [facility] 916-01 I Date of Admission: 6/16/2025   Date of Service: 6/19/2025 I Hospital Day: 3    Assessment & Plan  Primary central nervous system (CNS) lymphoma  recently-diagnosed primary CNS (Dx 4/14/2025, double expressor (BCL2+/MYC+), DLBCL NOS, non-germinal center, Ki-67 60-70, BCL6 rearrangement   treatment course of high-dose MTX + rituximab complicated by sepsis due to recurrent E. coli bacteremia, LETY, and transaminitis.    Presents for inpatient chemotherapy with methotrexate and rituximab  Management per hematology/oncology service  Type 2 diabetes mellitus with hyperglycemia, without long-term current use of insulin (HCC)  Controlled with last hemoglobin A1c in February 6 0.6%  Hold home oral regimen  Monitor on sliding scale  Consistent carb diet  Hypoglycemia protocol  Continue lantus 10 units    HTN, goal below 130/80  Not on any antihypertensives  Continue to monitor  Mixed hyperlipidemia  Continue statin  UTI (urinary tract infection)  UA and culture are negative for growth  Observe off antibiotics  Dental infection  Has been taking amoxicillin TID since 6/12, today will be day 7/7 of antibiotics    VTE Pharmacologic Prophylaxis: VTE Score: 4 Moderate Risk (Score 3-4) - Pharmacological DVT Prophylaxis Ordered: heparin.    Mobility:   Basic Mobility Inpatient Raw Score: 18  JH-HLM Goal: 6: Walk 10 steps or more  JH-HLM Achieved: 6: Walk 10 steps or more  JH-HLM Goal NOT achieved. Continue with multidisciplinary rounding and encourage appropriate mobility to improve upon JH-HLM goals.    Patient Centered Rounds: I performed bedside rounds with nursing staff today.   Discussions with Specialists or Other Care Team Provider: nurse, CM, oncology    Current Length of Stay: 3 day(s)  Current Patient Status: Inpatient   Certification Statement: The patient will continue to require additional inpatient hospital stay due  to IP chemotherapy  Discharge Plan: Anticipate discharge in 24-48 hrs to home.    Code Status: Level 1 - Full Code    Subjective   Denies any complaints today    Objective :  Temp:  [97.9 °F (36.6 °C)-98.6 °F (37 °C)] 97.9 °F (36.6 °C)  HR:  [63-76] 64  BP: (130-163)/(59-84) 136/70  Resp:  [15-18] 15  SpO2:  [94 %-100 %] 95 %  O2 Device: None (Room air)    Body mass index is 25.43 kg/m².     Input and Output Summary (last 24 hours):     Intake/Output Summary (Last 24 hours) at 6/19/2025 1559  Last data filed at 6/19/2025 1550  Gross per 24 hour   Intake 4265.1 ml   Output 2605 ml   Net 1660.1 ml       Physical Exam  Constitutional:       Appearance: Normal appearance.   HENT:      Head: Normocephalic and atraumatic.      Nose: Nose normal.     Eyes:      Extraocular Movements: Extraocular movements intact.       Cardiovascular:      Rate and Rhythm: Normal rate and regular rhythm.   Pulmonary:      Effort: Pulmonary effort is normal.      Breath sounds: No wheezing or rales.     Musculoskeletal:      Right lower leg: No edema.      Left lower leg: No edema.     Neurological:      Mental Status: He is alert and oriented to person, place, and time.     Psychiatric:         Mood and Affect: Mood normal.         Behavior: Behavior normal.           Lines/Drains:  Lines/Drains/Airways       Active Status       Name Placement date Placement time Site Days    PICC Line 06/16/25 Left Brachial 06/16/25  1652  Brachial  2                    Central Line:  Goal for removal: Port accessed. Will de-access as appropriate.               Lab Results: I have reviewed the following results:   Results from last 7 days   Lab Units 06/19/25  0512   WBC Thousand/uL 8.95   HEMOGLOBIN g/dL 8.8*   HEMATOCRIT % 27.4*   PLATELETS Thousands/uL 267   SEGS PCT % 65   LYMPHO PCT % 27   MONO PCT % 5   EOS PCT % 1     Results from last 7 days   Lab Units 06/19/25  0512 06/17/25  0444 06/16/25  1127   SODIUM mmol/L 143   < > 137   POTASSIUM mmol/L  3.5   < > 4.0   CHLORIDE mmol/L 103   < > 102   CO2 mmol/L 33*   < > 30   BUN mg/dL 13   < > 19   CREATININE mg/dL 0.94   < > 1.01   ANION GAP mmol/L 7   < > 5   CALCIUM mg/dL 8.5   < > 9.0   ALBUMIN g/dL  --   --  4.0   TOTAL BILIRUBIN mg/dL  --   --  0.30   ALK PHOS U/L  --   --  77   ALT U/L  --   --  10   AST U/L  --   --  12*   GLUCOSE RANDOM mg/dL 120   < > 203*    < > = values in this interval not displayed.         Results from last 7 days   Lab Units 06/19/25  1201 06/19/25  0757 06/18/25  2103 06/18/25  1631 06/18/25  1127 06/18/25  0831 06/18/25  0026 06/17/25  2317 06/17/25  2207 06/17/25  1601 06/17/25  1154 06/17/25  0748   POC GLUCOSE mg/dl 110 113 277* 249* 130 126 356* 405* 422* 297* 137 145*     Results from last 7 days   Lab Units 06/16/25  1127   HEMOGLOBIN A1C % 5.5           Recent Cultures (last 7 days):   Results from last 7 days   Lab Units 06/16/25  1659   URINE CULTURE  No Growth <1000 cfu/mL       Imaging Results Review: No pertinent imaging studies reviewed.  Other Study Results Review: No additional pertinent studies reviewed.    Last 24 Hours Medication List:     Current Facility-Administered Medications:     acetaminophen (TYLENOL) tablet 650 mg, Q6H PRN    allopurinol (ZYLOPRIM) tablet 300 mg, Daily    alteplase (CATHFLO) injection 2 mg, Q1MIN PRN    atorvastatin (LIPITOR) tablet 20 mg, Daily    docusate sodium (COLACE) capsule 100 mg, BID    enoxaparin (LOVENOX) subcutaneous injection 40 mg, Daily    insulin glargine (LANTUS) subcutaneous injection 10 Units 0.1 mL, QAM    insulin lispro (HumALOG/ADMELOG) 100 units/mL subcutaneous injection 1-6 Units, 4x Daily (AC & HS) **AND** Fingerstick Glucose (POCT), 4x Daily AC and at bedtime    [START ON 6/20/2025] leucovorin (WELLCOVORIN) tablet 25 mg, Q6H    leucovorin 25 mg in dextrose 5 % 50 mL IVPB, Q6H, Last Rate: 210 mL/hr at 06/19/25 1420    ondansetron (ZOFRAN) injection 4 mg, Q6H PRN    pantoprazole (PROTONIX) EC tablet 40 mg, Early  Morning    QUEtiapine (SEROquel) tablet 12.5 mg, BID    senna (SENOKOT) tablet 17.2 mg, Daily With Lunch    sodium bicarbonate 100 mEq in dextrose 5 % 1,000 mL infusion, Continuous, Last Rate: 100 mL/hr (06/18/25 0902)    sodium chloride 0.9 % infusion, Once PRN    sodium chloride 0.9 % infusion, Once PRN, Last Rate: 20 mL/hr (06/17/25 1152)    Administrative Statements   Today, Patient Was Seen By: Raul Langley MD  I have spent a total time of 40 minutes in caring for this patient on the day of the visit/encounter including Diagnostic results, Instructions for management, Patient and family education, Impressions, Counseling / Coordination of care, Documenting in the medical record, Reviewing/placing orders in the medical record (including tests, medications, and/or procedures), Obtaining or reviewing history  , and Communicating with other healthcare professionals .    **Please Note: This note may have been constructed using a voice recognition system.**

## 2025-06-19 NOTE — ASSESSMENT & PLAN NOTE
Controlled with last hemoglobin A1c in February 6 0.6%  Hold home oral regimen  Monitor on sliding scale  Consistent carb diet  Hypoglycemia protocol  Continue lantus 10 units

## 2025-06-20 LAB
BACTERIA UR QL AUTO: ABNORMAL /HPF
BACTERIA UR QL AUTO: NORMAL /HPF
BILIRUB UR QL STRIP: NEGATIVE
CLARITY UR: CLEAR
COLOR UR: COLORLESS
GLUCOSE SERPL-MCNC: 108 MG/DL (ref 65–140)
GLUCOSE SERPL-MCNC: 111 MG/DL (ref 65–140)
GLUCOSE SERPL-MCNC: 137 MG/DL (ref 65–140)
GLUCOSE SERPL-MCNC: 152 MG/DL (ref 65–140)
GLUCOSE UR STRIP-MCNC: NEGATIVE MG/DL
HGB UR QL STRIP.AUTO: NEGATIVE
KETONES UR STRIP-MCNC: NEGATIVE MG/DL
LEUKOCYTE ESTERASE UR QL STRIP: NEGATIVE
NITRITE UR QL STRIP: NEGATIVE
NON-SQ EPI CELLS URNS QL MICRO: ABNORMAL /HPF
NON-SQ EPI CELLS URNS QL MICRO: NORMAL /HPF
PH UR STRIP.AUTO: 7.5 [PH]
PH UR STRIP.AUTO: 8 [PH]
PH UR STRIP.AUTO: 8 [PH]
PH UR STRIP.AUTO: 8.5 [PH]
PROT UR STRIP-MCNC: NEGATIVE MG/DL
RBC #/AREA URNS AUTO: ABNORMAL /HPF
RBC #/AREA URNS AUTO: NORMAL /HPF
SP GR UR STRIP.AUTO: 1.01 (ref 1–1.03)
UROBILINOGEN UR STRIP-ACNC: <2 MG/DL
WBC #/AREA URNS AUTO: ABNORMAL /HPF
WBC #/AREA URNS AUTO: NORMAL /HPF

## 2025-06-20 PROCEDURE — 99232 SBSQ HOSP IP/OBS MODERATE 35: CPT | Performed by: INTERNAL MEDICINE

## 2025-06-20 PROCEDURE — 80204 DRUG ASSAY METHOTREXATE: CPT | Performed by: INTERNAL MEDICINE

## 2025-06-20 PROCEDURE — 82948 REAGENT STRIP/BLOOD GLUCOSE: CPT

## 2025-06-20 PROCEDURE — 81001 URINALYSIS AUTO W/SCOPE: CPT | Performed by: PHYSICIAN ASSISTANT

## 2025-06-20 RX ADMIN — ALLOPURINOL 300 MG: 300 TABLET ORAL at 07:37

## 2025-06-20 RX ADMIN — PANTOPRAZOLE SODIUM 40 MG: 40 TABLET, DELAYED RELEASE ORAL at 05:49

## 2025-06-20 RX ADMIN — ATORVASTATIN CALCIUM 20 MG: 20 TABLET, FILM COATED ORAL at 07:37

## 2025-06-20 RX ADMIN — LEUCOVORIN CALCIUM 25 MG: 25 TABLET ORAL at 20:18

## 2025-06-20 RX ADMIN — SODIUM BICARBONATE 100 ML/HR: 84 INJECTION, SOLUTION INTRAVENOUS at 01:59

## 2025-06-20 RX ADMIN — INSULIN LISPRO 1 UNITS: 100 INJECTION, SOLUTION INTRAVENOUS; SUBCUTANEOUS at 16:54

## 2025-06-20 RX ADMIN — INSULIN GLARGINE 10 UNITS: 100 INJECTION, SOLUTION SUBCUTANEOUS at 07:37

## 2025-06-20 RX ADMIN — QUETIAPINE FUMARATE 12.5 MG: 25 TABLET ORAL at 12:23

## 2025-06-20 RX ADMIN — LEUCOVORIN CALCIUM 25 MG: 25 TABLET ORAL at 13:43

## 2025-06-20 RX ADMIN — SENNOSIDES 17.2 MG: 8.6 TABLET, FILM COATED ORAL at 12:23

## 2025-06-20 RX ADMIN — LEUCOVORIN CALCIUM 25 MG: 25 TABLET ORAL at 01:58

## 2025-06-20 RX ADMIN — DOCUSATE SODIUM 100 MG: 100 CAPSULE, LIQUID FILLED ORAL at 07:37

## 2025-06-20 RX ADMIN — SODIUM BICARBONATE 100 ML/HR: 84 INJECTION, SOLUTION INTRAVENOUS at 14:41

## 2025-06-20 RX ADMIN — QUETIAPINE FUMARATE 12.5 MG: 25 TABLET ORAL at 20:18

## 2025-06-20 RX ADMIN — ENOXAPARIN SODIUM 40 MG: 40 INJECTION SUBCUTANEOUS at 07:37

## 2025-06-20 RX ADMIN — LEUCOVORIN CALCIUM 25 MG: 25 TABLET ORAL at 07:37

## 2025-06-20 RX ADMIN — DOCUSATE SODIUM 100 MG: 100 CAPSULE, LIQUID FILLED ORAL at 16:54

## 2025-06-20 NOTE — PROGRESS NOTES
Medical Oncology/Hematology Progress Note  Alexis Dennison, male, 64 y.o., 1960,  PPHP 916/[facility] [id number], [id number]     Assessment and Plan  1. Primary central nervous system (CNS) lymphoma,  double expresser    IN SUMMARY, DLBCL NOS, non-germinal center, Ki-67 60-70, BCL6 rearrangement)   Diagnosed in 4/ 2025. Established with Dr. Phan      On Q-14 days High-dose IV Methotrexate   C1 (4/18) 8 g/m²  C2 (5/2) 3 g/m², dose-reduced due to LETY, delayed x1 day due to E. coli bacteremia   C3 (5/21) 3 g/m²   C4 : 3 g/m² ; given on 6/17/25   Scheduled Start Date/Time: 06/17/25 1430 End Date/Time: 06/17/25 1909 after 1 doses     S/p Rituximab x 5 doses 4/17/25 - 5/23/25 ; received rituximab 6/19/25 ; and tolerated it well     Patient had prescription for temozolomide total to 90 mg a day on days 7 through 11 (6/23 - 6/27) ; patient and his wife made aware and they confirmed having the medication available at home     PLAN   - urine alkalization per protocol ; IV Na Bicarb, Q6H UA , keep PH > 7        Latest Reference Range & Units 06/20/25 02:58 06/20/25 10:38   pH, UA 4.5, 5.0, 5.5, 6.0, 6.5, 7.0, 7.5, 8.0  8.0 8.5 !      Latest Reference Range & Units 06/18/25 13:57 06/19/25 14:11   Methotrexate Lvl umol/L 4.30 0.45     - daily CBC and CMP   - daily MTX level; goal before discharge < 0.05   - Q6H Leucovorin 24 hours post MTX   - Outpatient MRI Brain is scheduled 6/26/25   - on ppx allopurinol       2.  Recent history of UTI   UA 6/12/2025 large leukocytes and positive nitrites  He noted occasional but not all the time dysuria  He is afebrile WBC not elevated  urine culture 6/16   Urine Culture No Growth <1000 cfu/mL      S/p Cipro 6/12 - 6/18 ; now off it and denies any urinary sx     __________________________________________    Outpatient follow up plan: With Dr. Phan  Communication with patient/family:  I did update the patient   Communication with team:  Did communicate with Dr. Langley, primary team.   I did  review this patient with Dr. Lee and she is in agreement with this plan.              Subjective:  Seen and examined today with my attending, Dr. Lee, patient's wife was available at bedside, no acute events he denies any new symptoms or complaint.  Above assessment and plan reviewed and explained again today verbalized understanding and agreement to the above .      Objective:     Medication Administration - last 24 hours from 06/19/2025 1417 to 06/20/2025 1417         Date/Time Order Dose Route Action Action by     06/20/2025 0737 EDT enoxaparin (LOVENOX) subcutaneous injection 40 mg 40 mg Subcutaneous Given Marcus Queen RN     06/20/2025 0737 EDT allopurinol (ZYLOPRIM) tablet 300 mg 300 mg Oral Given Marcus Queen RN     06/20/2025 0737 EDT atorvastatin (LIPITOR) tablet 20 mg 20 mg Oral Given Marcus Queen RN     06/20/2025 0737 EDT docusate sodium (COLACE) capsule 100 mg 100 mg Oral Given Marcus Queen RN     06/19/2025 1732 EDT docusate sodium (COLACE) capsule 100 mg 100 mg Oral Given Ceferino Rodas RN     06/20/2025 0549 EDT pantoprazole (PROTONIX) EC tablet 40 mg 40 mg Oral Given Kate Hooper RN     06/20/2025 1223 EDT QUEtiapine (SEROquel) tablet 12.5 mg 12.5 mg Oral Given Hawk Call RN     06/19/2025 2005 EDT QUEtiapine (SEROquel) tablet 12.5 mg 12.5 mg Oral Given Kate Hooper RN     06/20/2025 1223 EDT senna (SENOKOT) tablet 17.2 mg 17.2 mg Oral Given Hawk Call RN     06/19/2025 2006 EDT leucovorin 25 mg in dextrose 5 % 50 mL IVPB 0 mg Intravenous Stopped Kate Hooper RN     06/19/2025 1951 EDT leucovorin 25 mg in dextrose 5 % 50 mL IVPB 25 mg Intravenous New Bag Kate Hooper RN     06/19/2025 1420 EDT leucovorin 25 mg in dextrose 5 % 50 mL IVPB -- Intravenous Rate/Dose Sarah Marti, ADELINE     06/19/2025 1419 EDT riTUXimab (RITUXAN) 800 mg in sodium chloride 0.9 % 320 mL subsequent titrated chemo infusion 0 mg Intravenous Stopped Elly Marti RN      "06/20/2025 0159 EDT sodium bicarbonate 100 mEq in dextrose 5 % 1,000 mL infusion 100 mL/hr Intravenous New Bag Kate Hooper RN     06/20/2025 0158 EDT sodium bicarbonate 100 mEq in dextrose 5 % 1,000 mL infusion 0 mL/hr Intravenous Stopped Narcisa Acosta, ADELINE     06/19/2025 1816 EDT sodium bicarbonate 100 mEq in dextrose 5 % 1,000 mL infusion 100 mL/hr Intravenous New Bag Ceferino Rodas RN     06/20/2025 1208 EDT insulin lispro (HumALOG/ADMELOG) 100 units/mL subcutaneous injection 1-6 Units -- Subcutaneous Not Given Marcus Queen RN     06/20/2025 0815 EDT insulin lispro (HumALOG/ADMELOG) 100 units/mL subcutaneous injection 1-6 Units -- Subcutaneous Not Given Marcus Queen RN     06/19/2025 2200 EDT insulin lispro (HumALOG/ADMELOG) 100 units/mL subcutaneous injection 1-6 Units -- Subcutaneous Canceled Entry Kate Hooper RN     06/19/2025 2055 EDT insulin lispro (HumALOG/ADMELOG) 100 units/mL subcutaneous injection 1-6 Units 1 Units Subcutaneous Given Kate Hooper RN     06/19/2025 1731 EDT insulin lispro (HumALOG/ADMELOG) 100 units/mL subcutaneous injection 1-6 Units 1 Units Subcutaneous Given Ceferino Rodas RN     06/20/2025 0737 EDT insulin glargine (LANTUS) subcutaneous injection 10 Units 0.1 mL 10 Units Subcutaneous Given Marcus Queen RN     06/20/2025 1343 EDT leucovorin (WELLCOVORIN) tablet 25 mg 25 mg Oral Given Hawk Call RN     06/20/2025 0737 EDT leucovorin (WELLCOVORIN) tablet 25 mg 25 mg Oral Given Marcus Queen RN     06/20/2025 0158 EDT leucovorin (WELLCOVORIN) tablet 25 mg 25 mg Oral Given Narcisa Acosta RN            /75   Pulse 78   Temp 98.2 °F (36.8 °C)   Resp 17   Ht 5' 10\" (1.778 m)   Wt 80.4 kg (177 lb 4 oz)   SpO2 98%   BMI 25.43 kg/m²       Physical Exam  - GEN: Flat affect ; appears well, alert and oriented x 3, pleasant and cooperative, in no acute distress  - HEENT: Anicteric, mucous membranes moist, PERRL and EOMI   - NECK: No lymphadenopathy, JVD " or carotid bruits   - HEART: RRR, normal S1 and S2, no murmurs, clicks, gallops or rubs   - LUNGS: Clear to auscultation bilaterally; no wheezes, rales, or rhonchi  - ABDOMEN: Normal bowel sounds, soft, no tenderness, no distention, no organomegaly or masses felt on exam.   - EXTREMITIES: Peripheral pulses normal; no clubbing, cyanosis; no edema   - NEURO: Slow speech/mild memory deficit noted, no focal findings, CN II-XII are grossly intact.   - Musculoskeletal: 5/5 strength, normal ROM, no swollen or erythematous joints.   - SKIN: Normal without suspicious lesions on exposed skin    Recent Results (from the past 48 hours)   Urinalysis with microscopic    Collection Time: 06/18/25  4:20 PM   Result Value Ref Range    Color, UA Colorless     Clarity, UA Clear     Specific Gravity, UA 1.003 1.003 - 1.030    pH, UA 7.5 4.5, 5.0, 5.5, 6.0, 6.5, 7.0, 7.5, 8.0    Leukocytes, UA Negative Negative    Nitrite, UA Negative Negative    Protein, UA Negative Negative mg/dl    Glucose,  (3/20%) (A) Negative mg/dl    Ketones, UA Negative Negative mg/dl    Urobilinogen, UA <2.0 <2.0 mg/dl mg/dl    Bilirubin, UA Negative Negative    Occult Blood, UA Negative Negative    RBC, UA None Seen None Seen, 1-2 /hpf    WBC, UA None Seen None Seen, 1-2 /hpf    Epithelial Cells None Seen None Seen, Occasional /hpf    Bacteria, UA None Seen None Seen, Occasional /hpf   Fingerstick Glucose (POCT)    Collection Time: 06/18/25  4:31 PM   Result Value Ref Range    POC Glucose 249 (H) 65 - 140 mg/dl   Fingerstick Glucose (POCT)    Collection Time: 06/18/25  9:03 PM   Result Value Ref Range    POC Glucose 277 (H) 65 - 140 mg/dl   Urinalysis with microscopic    Collection Time: 06/18/25  9:56 PM   Result Value Ref Range    Color, UA Colorless     Clarity, UA Clear     Specific Gravity, UA 1.014 1.003 - 1.030    pH, UA 8.0 4.5, 5.0, 5.5, 6.0, 6.5, 7.0, 7.5, 8.0    Leukocytes, UA (A) Negative     Elevated glucose may cause decreased leukocyte  values. See urine microscopic for UWBC result    Nitrite, UA Negative Negative    Protein, UA Negative Negative mg/dl    Glucose, UA >=1000 (1%) (A) Negative mg/dl    Ketones, UA Negative Negative mg/dl    Urobilinogen, UA <2.0 <2.0 mg/dl mg/dl    Bilirubin, UA Negative Negative    Occult Blood, UA Negative Negative    RBC, UA None Seen None Seen, 1-2 /hpf    WBC, UA None Seen None Seen, 1-2 /hpf    Epithelial Cells None Seen None Seen, Occasional /hpf    Bacteria, UA None Seen None Seen, Occasional /hpf   Basic metabolic panel    Collection Time: 06/19/25  5:12 AM   Result Value Ref Range    Sodium 143 135 - 147 mmol/L    Potassium 3.5 3.5 - 5.3 mmol/L    Chloride 103 96 - 108 mmol/L    CO2 33 (H) 21 - 32 mmol/L    ANION GAP 7 4 - 13 mmol/L    BUN 13 5 - 25 mg/dL    Creatinine 0.94 0.60 - 1.30 mg/dL    Glucose 120 65 - 140 mg/dL    Calcium 8.5 8.4 - 10.2 mg/dL    eGFR 84 ml/min/1.73sq m   CBC and differential    Collection Time: 06/19/25  5:12 AM   Result Value Ref Range    WBC 8.95 4.31 - 10.16 Thousand/uL    RBC 2.87 (L) 3.88 - 5.62 Million/uL    Hemoglobin 8.8 (L) 12.0 - 17.0 g/dL    Hematocrit 27.4 (L) 36.5 - 49.3 %    MCV 96 82 - 98 fL    MCH 30.7 26.8 - 34.3 pg    MCHC 32.1 31.4 - 37.4 g/dL    RDW 17.9 (H) 11.6 - 15.1 %    MPV 10.3 8.9 - 12.7 fL    Platelets 267 149 - 390 Thousands/uL    nRBC 0 /100 WBCs    Segmented % 65 43 - 75 %    Immature Grans % 1 0 - 2 %    Lymphocytes % 27 14 - 44 %    Monocytes % 5 4 - 12 %    Eosinophils Relative 1 0 - 6 %    Basophils Relative 1 0 - 1 %    Absolute Neutrophils 5.86 1.85 - 7.62 Thousands/µL    Absolute Immature Grans 0.06 0.00 - 0.20 Thousand/uL    Absolute Lymphocytes 2.37 0.60 - 4.47 Thousands/µL    Absolute Monocytes 0.48 0.17 - 1.22 Thousand/µL    Eosinophils Absolute 0.12 0.00 - 0.61 Thousand/µL    Basophils Absolute 0.06 0.00 - 0.10 Thousands/µL   Urinalysis with microscopic    Collection Time: 06/19/25  5:13 AM   Result Value Ref Range    Color, UA Colorless      Clarity, UA Clear     Specific Gravity, UA 1.009 1.003 - 1.030    pH, UA 7.5 4.5, 5.0, 5.5, 6.0, 6.5, 7.0, 7.5, 8.0    Leukocytes, UA Negative Negative    Nitrite, UA Negative Negative    Protein, UA Negative Negative mg/dl    Glucose, UA 70 (7/100%) (A) Negative mg/dl    Ketones, UA Negative Negative mg/dl    Urobilinogen, UA <2.0 <2.0 mg/dl mg/dl    Bilirubin, UA Negative Negative    Occult Blood, UA Negative Negative    RBC, UA None Seen None Seen, 1-2 /hpf    WBC, UA 1-2 None Seen, 1-2 /hpf    Epithelial Cells None Seen None Seen, Occasional /hpf    Bacteria, UA None Seen None Seen, Occasional /hpf   Fingerstick Glucose (POCT)    Collection Time: 06/19/25  7:57 AM   Result Value Ref Range    POC Glucose 113 65 - 140 mg/dl   Urinalysis with microscopic    Collection Time: 06/19/25 11:16 AM   Result Value Ref Range    Color, UA Colorless     Clarity, UA Clear     Specific Gravity, UA 1.008 1.003 - 1.030    pH, UA 8.0 4.5, 5.0, 5.5, 6.0, 6.5, 7.0, 7.5, 8.0    Leukocytes, UA Negative Negative    Nitrite, UA Negative Negative    Protein, UA Negative Negative mg/dl    Glucose, UA Negative Negative mg/dl    Ketones, UA Negative Negative mg/dl    Urobilinogen, UA <2.0 <2.0 mg/dl mg/dl    Bilirubin, UA Negative Negative    Occult Blood, UA Negative Negative    RBC, UA None Seen None Seen, 1-2 /hpf    WBC, UA 1-2 None Seen, 1-2 /hpf    Epithelial Cells None Seen None Seen, Occasional /hpf    Bacteria, UA None Seen None Seen, Occasional /hpf   Fingerstick Glucose (POCT)    Collection Time: 06/19/25 12:01 PM   Result Value Ref Range    POC Glucose 110 65 - 140 mg/dl   Methotrexate level    Collection Time: 06/19/25  2:11 PM   Result Value Ref Range    Methotrexate Lvl 0.45 umol/L   Fingerstick Glucose (POCT)    Collection Time: 06/19/25  4:42 PM   Result Value Ref Range    POC Glucose 157 (H) 65 - 140 mg/dl   Urinalysis with microscopic    Collection Time: 06/19/25  4:50 PM   Result Value Ref Range    Color, UA Colorless      Clarity, UA Clear     Specific Gravity, UA 1.003 1.003 - 1.030    pH, UA 7.5 4.5, 5.0, 5.5, 6.0, 6.5, 7.0, 7.5, 8.0    Leukocytes, UA Negative Negative    Nitrite, UA Negative Negative    Protein, UA Negative Negative mg/dl    Glucose, UA Negative Negative mg/dl    Ketones, UA Negative Negative mg/dl    Urobilinogen, UA <2.0 <2.0 mg/dl mg/dl    Bilirubin, UA Negative Negative    Occult Blood, UA Negative Negative    RBC, UA None Seen None Seen, 1-2 /hpf    WBC, UA None Seen None Seen, 1-2 /hpf    Epithelial Cells None Seen None Seen, Occasional /hpf    Bacteria, UA None Seen None Seen, Occasional /hpf   Fingerstick Glucose (POCT)    Collection Time: 06/19/25  8:35 PM   Result Value Ref Range    POC Glucose 161 (H) 65 - 140 mg/dl   Urinalysis with microscopic    Collection Time: 06/19/25  9:58 PM   Result Value Ref Range    Color, UA Colorless     Clarity, UA Clear     Specific Gravity, UA 1.005 1.003 - 1.030    pH, UA 7.5 4.5, 5.0, 5.5, 6.0, 6.5, 7.0, 7.5, 8.0    Leukocytes, UA Negative Negative    Nitrite, UA Negative Negative    Protein, UA Negative Negative mg/dl    Glucose, UA Negative Negative mg/dl    Ketones, UA Negative Negative mg/dl    Urobilinogen, UA <2.0 <2.0 mg/dl mg/dl    Bilirubin, UA Negative Negative    Occult Blood, UA Negative Negative    RBC, UA 1-2 None Seen, 1-2 /hpf    WBC, UA 1-2 None Seen, 1-2 /hpf    Epithelial Cells None Seen None Seen, Occasional /hpf    Bacteria, UA None Seen None Seen, Occasional /hpf   Urinalysis with microscopic    Collection Time: 06/20/25  2:58 AM   Result Value Ref Range    Color, UA Colorless     Clarity, UA Clear     Specific Gravity, UA 1.007 1.003 - 1.030    pH, UA 8.0 4.5, 5.0, 5.5, 6.0, 6.5, 7.0, 7.5, 8.0    Leukocytes, UA Negative Negative    Nitrite, UA Negative Negative    Protein, UA Negative Negative mg/dl    Glucose, UA Negative Negative mg/dl    Ketones, UA Negative Negative mg/dl    Urobilinogen, UA <2.0 <2.0 mg/dl mg/dl    Bilirubin, UA Negative  Negative    Occult Blood, UA Negative Negative    RBC, UA None Seen None Seen, 1-2 /hpf    WBC, UA None Seen None Seen, 1-2 /hpf    Epithelial Cells None Seen None Seen, Occasional /hpf    Bacteria, UA None Seen None Seen, Occasional /hpf   Fingerstick Glucose (POCT)    Collection Time: 06/20/25  8:14 AM   Result Value Ref Range    POC Glucose 111 65 - 140 mg/dl   Urinalysis with microscopic    Collection Time: 06/20/25 10:38 AM   Result Value Ref Range    Color, UA Colorless     Clarity, UA Clear     Specific Gravity, UA 1.009 1.003 - 1.030    pH, UA 8.5 (A) 4.5, 5.0, 5.5, 6.0, 6.5, 7.0, 7.5, 8.0    Leukocytes, UA Negative Negative    Nitrite, UA Negative Negative    Protein, UA Negative Negative mg/dl    Glucose, UA Negative Negative mg/dl    Ketones, UA Negative Negative mg/dl    Urobilinogen, UA <2.0 <2.0 mg/dl mg/dl    Bilirubin, UA Negative Negative    Occult Blood, UA Negative Negative    RBC, UA None Seen None Seen, 1-2 /hpf    WBC, UA None Seen None Seen, 1-2 /hpf    Epithelial Cells Occasional None Seen, Occasional /hpf    Bacteria, UA None Seen None Seen, Occasional /hpf   Fingerstick Glucose (POCT)    Collection Time: 06/20/25 11:06 AM   Result Value Ref Range    POC Glucose 108 65 - 140 mg/dl       NM PET CT skull base to mid thigh  Result Date: 6/16/2025  Narrative: PET/CT SCAN INDICATION: C83.390: Primary central nervous system lymphoma, restaging MODIFIER: PS COMPARISON: CT of abdomen and pelvis dated 5/18/2025, MRI of the brain, cervical spine, thoracic spine, and lumbar spine dated 4/19/25, CT angiography of the chest, abdomen, and pelvis dated 3/29/2025 CELL TYPE: Involved by pt's large B-cell lymphoma (bx 4/13/25 CSF +) TECHNIQUE:   8.2 mCi F-18-FDG administered IV. Multiplanar attenuation corrected and non attenuation corrected PET images are available for interpretation, and contiguous, low dose, axial CT sections were obtained from the skull vertex through the femurs. Intravenous contrast  material was not utilized. This examination, like all CT scans performed in the [facility], was performed utilizing techniques to minimize radiation dose exposure, including the use of iterative reconstruction and automated exposure control. Fasting serum glucose: 115 mg/dl FINDINGS: VISUALIZED BRAIN: On PET image 37 there is subtle asymmetric focal activity involving the left cerebral hemisphere slightly posterior to the left frontal horn which is difficult to correlate on low-dose unenhanced CT but measures approximately 1.5 cm on PET imaging with max SUV of 7.7; this finding is in the expected location patient's known enhancing intracranial mass which is better characterized on MRI of the brain dated 4/20/25 and could reflect mild hypermetabolic malignant activity versus asymmetric activity involving the left basal ganglia which is in this region. Patient's known intracranial malignancy is better characterized on MRI of the brain given background physiologic brain activity and continued follow-up with MRI of the brain is recommended. HEAD/NECK: On image 20 there is mild FDG activity associated with cutaneous/subcutaneous soft tissue density involving the left frontal scalp which appears superficial to a siomara hole in the left frontal calvarium extending from images 21 through 24 of series 3. On image 20 of series 3 this finding measures approximately 1.3 cm in length with max SUV of 3.9 and possibly reflects inflammatory activity although correlation is recommended to exclude the possibility of cutaneous/subcutaneous malignancy of low metabolic  activity. CT images: Essentially stable prominence to the lateral ventricles which is better characterized on prior dedicated MRI of the brain. Stable asymmetric nodular enlargement of the left thyroid gland which was better characterized on thyroid ultrasound dated 6//25 with recommendation for tissue sampling was made. On image 93 of series 3 there is a  nonspecific mildly prominent common nonpathologic in size left perithyroidal/lower cervical lymph node measuring 6 mm in short axis. CHEST: No FDG avid soft tissue lesions are seen. CT images: Trace gynecomastia. Atherosclerotic vascular calcifications including those of the coronary arteries are noted. Tiny calcified right middle lobe granuloma. On image 129 of series 3 there is a new 6 mm left lower lobe lung nodule, possibly related to adjacent subtle patchy likely inflammatory groundglass airspace disease although given known malignancy, short interval follow-up with CT of chest in 3 months is recommended to ensure stability. ABDOMEN: Fairly diffuse nonspecific nonuniform bowel activity, possibly physiologic which limits evaluation for underlying hypermetabolic bowel malignancy. CT images: Stable right hepatic hypodensity which was better characterized on prior MRI of abdomen where it was characterized as a cyst. Atherosclerotic vascular calcifications are noted. PELVIS: No FDG avid soft tissue lesions are seen. CT images: Enlarged prostate gland. Mild nonspecific diffuse mural thickening of the urinary bladder wall which is difficult to evaluate secondary to under distention. OSSEOUS STRUCTURES: No FDG avid lesions are seen. CT images: No significant findings.     Impression: 1.  Subtle asymmetric focal FDG activity involving the left cerebral hemisphere in the expected location of patient's known enhancing intracranial mass which could reflect mild hypermetabolic malignancy versus asymmetric physiologic activity involving the left basal ganglia. Patient's known intracranial malignancy is better characterized on MRI of the brain and continued follow-up with MRI of the brain is recommended as clinically indicated. 2.  Mild nonspecific FDG activity associated with cutaneous/subcutaneous soft tissue density involving the left frontal scalp which appears superficial to a siomara hole in the left frontal calvarium,  possibly reflecting inflammatory activity. Correlation with direct visualization is recommended to exclude the possibility of cutaneous/subcutaneous malignancy of low metabolic activity. 3.  New 6 mm left lower lobe lung nodule, possibly inflammatory. Short interval follow-up with CT of chest in 3 months is recommended to ensure stability. 4.  Please see above for details and additional findings. The study was marked in EPIC for significant notification. The study was marked in Epic for follow-up. Workstation performed: [id number]     US thyroid  Result Date: 6/11/2025  Narrative: THYROID ULTRASOUND INDICATION: E04.1: Nontoxic single thyroid nodule. COMPARISON: CTA head and neck 3/24/2025 TECHNIQUE: Ultrasound of the thyroid was performed with a high frequency linear transducer in transverse and sagittal planes including volumetric imaging sweeps as well as traditional still imaging technique. FINDINGS: Thyroid texture: Thyroid is mildly inhomogeneous and hypervascular. Right lobe: 5.0 x 1.5 x 1.9 cm. Volume 6.9 mL Left lobe: 6.0 x 3.1 x 2.8 cm. Volume 24.9 mL Isthmus: 0.3 cm. Nodule #1. Image 33. LEFT anterior mid to lower pole nodule. The length in depth is difficult to accurately determine as it is abutting an additional posterior nodule, best estimate measuring 3.1 x 1.4 x 2.1 cm (series 200 image 105 of 200). No prior ultrasound. COMPOSITION: 2 points, solid or almost completely solid. ECHOGENICITY: 1 point, hyperechoic or isoechoic. SHAPE: 0 points, wider-than-tall. MARGIN: 0 points, ill-defined. ECHOGENIC FOCI: 0 points, none or large comet-tail artifacts. TI-RADS Classification: TR 3 (3 points), FNA if >/= 2.5 cm. Follow if >/= 1.5 cm. Nodule #2. Image 36. LEFT posterior midgland nodule measuring 2.3 x 1.5 x 2.2 cm. No prior ultrasound. COMPOSITION: 2 points, solid or almost completely solid. ECHOGENICITY: 1 point, hyperechoic or isoechoic. SHAPE: 0 points, wider-than-tall. MARGIN: 0 points, smooth. ECHOGENIC  FOCI: 0 points, none or large comet-tail artifacts. TI-RADS Classification: TR 3 (3 points), FNA if >/= 2.5 cm. Follow if >/= 1.5 cm. There are additional nodules of lesser size and/or TI-RADS score. These do not necessitate additional evaluation based on ACR criteria.     Impression: Asymmetric left thyroid enlargement with associated nodules corresponding to previous CT findings. The following meet current ACR criteria for recommending ultrasound guided biopsy: 1. LEFT anterior mid to lower pole 3.1 cm nodule (Image 33). 2. LEFT posterior midgland nodule measuring 2.3 cm (Image 36). Reference: ACR Thyroid Imaging, Reporting and Data System (TI-RADS): White Paper of the ACR TI-RADS Committee. J AM Winifred Radiol 2017;14:587-595. Additional recommendations based on American Thyroid Association 2015 guidelines. The study was marked in EPIC for significant notification and follow-up. Workstation performed: [id number]     CT abdomen pelvis w contrast  Result Date: 5/19/2025  Narrative: CT ABDOMEN AND PELVIS WITH IV CONTRAST INDICATION: recurrent bacteremia, look for source. Blood culture and urine culture positive for gram-negative rods. COMPARISON: April 30, 2025. TECHNIQUE: CT examination of the abdomen and pelvis was performed. Multiplanar 2D reformatted images were created from the source data. This examination, like all CT scans performed in the [facility], was performed utilizing techniques to minimize radiation dose exposure, including the use of iterative reconstruction and automated exposure control. Radiation dose length product (DLP) for this visit: 390.85 mGy-cm. IV Contrast: 85 mL of iohexol (OMNIPAQUE) Enteric Contrast: Not administered. FINDINGS: ABDOMEN LOWER CHEST: A small calcified granuloma is redemonstrated anteriorly at the right lung base. There is coronary artery disease. A catheter terminates in the right atrium. LIVER/BILIARY TREE: There is a 13 x 9 mm cyst posteriorly in the right  lobe of the liver, similar to the prior study. GALLBLADDER: No calcified gallstones. No pericholecystic inflammatory change. SPLEEN: Unremarkable. PANCREAS: Unremarkable. ADRENAL GLANDS: Unremarkable. KIDNEYS/URETERS: Mild bilateral nonspecific perinephric stranding, increased somewhat compared with April 30, 2025. No hydronephrosis bilaterally. STOMACH AND BOWEL: No bowel obstruction. No pericolonic inflammatory change. APPENDIX: Normal. ABDOMINOPELVIC CAVITY: There is a small amount of free fluid, best demonstrated in the right lower quadrant and paracolic gutters. No pneumoperitoneum. VESSELS: Atherosclerosis. No abdominal aortic aneurysm. PELVIS REPRODUCTIVE ORGANS: Unremarkable for patient's age. URINARY BLADDER: Unremarkable. ABDOMINAL WALL/INGUINAL REGIONS: There is some gas within the subcutaneous fat of the anterior abdominal walls near the level of the umbilicus, left more so than right, possibly related to recent injections. Small fat-containing left inguinal hernia. BONES: No acute fracture or suspicious osseous lesion. Spinal degenerative changes, most notably at L5-S1.     Impression: There is mild bilateral nonspecific perinephric stranding, however, this does appear somewhat increased compared with April 30, 2025. Correlation with urinalysis and urine culture and sensitivity is recommended. There is a small amount of free fluid. Other nonemergent findings as above. Workstation performed: [id number]       I have personally reviewed labs, imaging studies, and pertinent reports.      This note has been generated by voice recognition software system.  Therefore, there may be spelling, grammar, and or syntax errors. Please contact if questions arise.     Simba Phillips, DO   Hematology and Medical Oncology - PGY V  [facility]

## 2025-06-20 NOTE — UTILIZATION REVIEW
Continued Stay Review    Date: 6/20/25                          Current Patient Class: Inpatient  Current Level of Care: med/surg    HPI:64 y.o. 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     Assessment/Plan:  denies any new symptoms or complaint. Cycle 4 of high-dose methotrexate 3 g/m² was started on 6/17/2025.  He received rituximab on 6/19/2025.  He is currently on leucovorin rescue.  Most recent methotrexate level was 0.45 from yesterday.  The goal is NTx less than 0.05 before discharge.  He is to start Temodar total dose of 9 mg daily on 6/23/2025 until 6/27/2025.  He should stay in the hospital until Monday  (6/23) for close monitoring per Heme/Onc.    Medications:   Scheduled Medications:  allopurinol, 300 mg, Oral, Daily  atorvastatin, 20 mg, Oral, Daily  docusate sodium, 100 mg, Oral, BID  enoxaparin, 40 mg, Subcutaneous, Daily  insulin glargine, 10 Units, Subcutaneous, QAM  insulin lispro, 1-6 Units, Subcutaneous, 4x Daily (AC & HS)  leucovorin, 25 mg, Oral, Q6H  pantoprazole, 40 mg, Oral, Early Morning  QUEtiapine, 12.5 mg, Oral, BID  senna, 17.2 mg, Oral, Daily With Lunch      Continuous IV Infusions:  sodium bicarbonate 100 mEq in dextrose 5 % 1,000 mL infusion, 100 mL/hr, Intravenous, Continuous      PRN Meds:  acetaminophen, 650 mg, Oral, Q6H PRN  alteplase, 2 mg, Intracatheter, Q1MIN PRN  ondansetron, 4 mg, Intravenous, Q6H PRN  sodium chloride, 20 mL/hr, Intravenous, Once PRN  sodium chloride, 20 mL/hr, Intravenous, Once PRN      Discharge Plan: TBD    Vital Signs (last 3 days)       Date/Time Temp Pulse Resp BP MAP (mmHg) SpO2 O2 Device Patient Position - Orthostatic VS Yeni Coma Scale Score Pain    06/20/25 15:44:57 98 °F (36.7 °C) 77 18 134/75 95 99 % -- -- -- --    06/20/25 13:11:08 98.2 °F (36.8 °C) 78 17 131/75  94 98 % -- -- -- --    06/20/25 08:15:43 98.8 °F (37.1 °C) 71 17 132/81 98 98 % -- -- -- --    06/20/25 0750 -- -- -- -- -- -- -- -- 14 No Pain    06/20/25 03:00:52 98.6 °F (37 °C) 70 17 132/81 98 99 % None (Room air) -- -- --    06/20/25 0000 -- -- -- -- -- -- -- -- 14 --    06/19/25 21:25:26 98.4 °F (36.9 °C) 60 16 163/82 109 99 % -- -- -- --    06/19/25 2000 -- -- -- -- -- -- -- -- 14 No Pain    06/19/25 18:41:54 98.5 °F (36.9 °C) 67 16 136/70 92 97 % -- -- -- --    06/19/25 1600 -- -- -- -- -- -- -- -- 14 No Pain    06/19/25 15:50:08 97.9 °F (36.6 °C) 64 15 136/70 92 95 % -- -- -- --    06/19/25 13:17:16 98 °F (36.7 °C) 65 17 135/74 94 98 % None (Room air) Lying -- No Pain    06/19/25 1251 -- 68 -- 131/73 92 97 % -- -- -- --    06/19/25 1220 -- 67 -- 130/73 92 99 % -- -- -- --    06/19/25 1200 -- -- -- -- -- -- -- -- 14 --    06/19/25 1150 -- 63 -- 133/75 94 98 % -- -- -- --    06/19/25 11:20:59 -- 67 -- 133/74 94 99 % -- -- -- --    06/19/25 0848 -- -- -- -- -- -- -- -- -- No Pain    06/19/25 0800 -- -- -- -- -- -- -- -- 14 --    06/19/25 07:58:44 98.2 °F (36.8 °C) 67 16 151/84 106 100 % None (Room air) -- -- --    06/19/25 0300 -- -- -- -- -- 94 % None (Room air) -- 15 No Pain    06/18/25 21:52:47 98.6 °F (37 °C) 69 18 163/59 94 97 % -- -- -- --    06/18/25 2000 -- -- -- -- -- 95 % None (Room air) -- 15 No Pain    06/18/25 19:14:47 98.6 °F (37 °C) 76 16 145/80 102 98 % -- -- -- --    06/18/25 15:39:20 98.2 °F (36.8 °C) 70 18 118/67 84 97 % -- -- -- --    06/18/25 1441 -- -- -- -- -- 94 % None (Room air) -- 15 --    06/18/25 0845 -- -- -- -- -- 98 % None (Room air) -- 15 No Pain    06/18/25 08:09:17 98.8 °F (37.1 °C) 73 20 134/71 92 100 % -- -- -- --    06/18/25 0245 -- -- -- -- -- -- -- -- 15 --    06/18/25 02:34:19 98.4 °F (36.9 °C) 86 15 115/61 79 98 % -- -- -- --    06/17/25 1945 -- -- -- -- -- 98 % None (Room air) -- 15 No Pain    06/17/25 19:29:22 97.8 °F (36.6 °C) 95 -- 138/78 98 98 % None (Room air)  Sitting -- --    06/17/25 15:57:19 98 °F (36.7 °C) 68 17 127/76 93 100 % -- -- -- --    06/17/25 1500 -- -- -- -- -- -- -- -- 15 --    06/17/25 11:56:02 98.2 °F (36.8 °C) 69 17 132/78 96 100 % -- -- -- --    06/17/25 11:53:33 98.2 °F (36.8 °C) 68 14 132/78 96 100 % -- -- -- --       Weight (last 2 days)       Date/Time Weight    06/19/25 0800 80.4 (177.25)            Pertinent Labs/Diagnostic Results:     Results from last 7 days   Lab Units 06/19/25  0512 06/18/25  0522 06/17/25 0444 06/16/25  1127   WBC Thousand/uL 8.95 15.64* 6.31 7.86   HEMOGLOBIN g/dL 8.8* 8.8* 8.6* 9.6*   HEMATOCRIT % 27.4* 25.6* 26.1* 30.2*   PLATELETS Thousands/uL 267 302 266 341   TOTAL NEUT ABS Thousands/µL 5.86  --  3.54 5.37       Results from last 7 days   Lab Units 06/19/25  0512 06/18/25  0522 06/17/25 0444 06/16/25  1127   SODIUM mmol/L 143 140 142 137   POTASSIUM mmol/L 3.5 3.9 3.6 4.0   CHLORIDE mmol/L 103 102 101 102   CO2 mmol/L 33* 31 34* 30   ANION GAP mmol/L 7 7 7 5   BUN mg/dL 13 15 15 19   CREATININE mg/dL 0.94 1.04 0.97 1.01   EGFR ml/min/1.73sq m 84 74 81 77   CALCIUM mg/dL 8.5 8.6 8.5 9.0   MAGNESIUM mg/dL  --  2.2 1.7* 1.7*   PHOSPHORUS mg/dL  --  2.7  --   --      Results from last 7 days   Lab Units 06/20/25  1644 06/20/25  1106 06/20/25  0814 06/19/25  2035 06/19/25  1642 06/19/25  1201 06/19/25  0757 06/18/25  2103 06/18/25  1631 06/18/25  1127 06/18/25  0831 06/18/25  0026   POC GLUCOSE mg/dl 152* 108 111 161* 157* 110 113 277* 249* 130 126 356*     Results from last 7 days   Lab Units 06/19/25  0512 06/18/25  0522 06/17/25  0444 06/16/25  1127   GLUCOSE RANDOM mg/dL 120 119 161* 203*       Results from last 7 days   Lab Units 06/20/25  1546 06/20/25  1038 06/20/25  0258   CLARITY UA  Clear Clear Clear   COLOR UA  Colorless Colorless Colorless   SPEC GRAV UA  1.006 1.009 1.007   PH UA  7.5 8.5* 8.0   GLUCOSE UA mg/dl Negative Negative Negative   KETONES UA mg/dl Negative Negative Negative   BLOOD UA  Negative  Negative Negative   PROTEIN UA mg/dl Negative Negative Negative   NITRITE UA  Negative Negative Negative   BILIRUBIN UA  Negative Negative Negative   UROBILINOGEN UA (BE) mg/dl <2.0 <2.0 <2.0   LEUKOCYTES UA  Negative Negative Negative   WBC UA /hpf None Seen None Seen None Seen   RBC UA /hpf None Seen None Seen None Seen   BACTERIA UA /hpf None Seen None Seen None Seen   EPITHELIAL CELLS WET PREP /hpf None Seen Occasional None Seen                 Network Utilization Review Department  ATTENTION: Please call with any questions or concerns to [phone] and carefully listen to the prompts so that you are directed to the right person. All voicemails are confidential.   For Discharge needs, contact Care Management DC Support Team at [phone] opt. 2  Send all requests for admission clinical reviews, approved or denied determinations and any other requests to dedicated fax number below belonging to the campus where the patient is receiving treatment. List of dedicated fax numbers for the Facilities:  FACILITY NAME UR FAX NUMBER   ADMISSION DENIALS (Administrative/Medical Necessity) [phone]   DISCHARGE SUPPORT TEAM (NETWORK) [phone]   PARENT CHILD HEALTH (Maternity/NICU/Pediatrics) [phone]   [facility] [phone]   [facility] [phone]   [facility] [phone]   [facility] [phone]   [facility] [phone]   [facility] [phone]   [facility] [phone]   [facility] [phone]   [facility] [phone]   [facility] [phone]   General acute hospital [phone]   [facility] [phone]

## 2025-06-20 NOTE — RESTORATIVE TECHNICIAN NOTE
Restorative Technician Note      Patient Name: Alexis Dennison     [facility] Tech Visit Date: 06/20/25  Note Type: Mobility  Patient Position Upon Consult: Supine  Activity Performed: Ambulated  Assistive Device: Other (Comment) (None)  Patient Position at End of Consult: Bedside chair; All needs within reach; Bed/Chair alarm activated    Sin Ramirez Restorative Technician

## 2025-06-20 NOTE — PLAN OF CARE
Problem: PAIN - ADULT  Goal: Verbalizes/displays adequate comfort level or baseline comfort level  Description: Interventions:  - Encourage patient to monitor pain and request assistance  - Assess pain using appropriate pain scale  - Administer analgesics as ordered based on type and severity of pain and evaluate response  - Implement non-pharmacological measures as appropriate and evaluate response  - Consider cultural and social influences on pain and pain management  - Notify physician/advanced practitioner if interventions unsuccessful or patient reports new pain  - Educate patient/family on pain management process including their role and importance of  reporting pain   - Provide non-pharmacologic/complimentary pain relief interventions  6/20/2025 1438 by Hawk Call RN  Outcome: Progressing  6/20/2025 1438 by Hawk Call RN  Outcome: Progressing     Problem: INFECTION - ADULT  Goal: Absence or prevention of progression during hospitalization  Description: INTERVENTIONS:  - Assess and monitor for signs and symptoms of infection  - Monitor lab/diagnostic results  - Monitor all insertion sites, i.e. indwelling lines, tubes, and drains  - Monitor endotracheal if appropriate and nasal secretions for changes in amount and color  - [place] appropriate cooling/warming therapies per order  - Administer medications as ordered  - Instruct and encourage patient and family to use good hand hygiene technique  - Identify and instruct in appropriate isolation precautions for identified infection/condition  6/20/2025 1438 by Hawk Call RN  Outcome: Progressing  6/20/2025 1438 by Hawk Call RN  Outcome: Progressing     Problem: PAIN - ADULT  Goal: Verbalizes/displays adequate comfort level or baseline comfort level  Description: Interventions:  - Encourage patient to monitor pain and request assistance  - Assess pain using appropriate pain scale  - Administer analgesics as ordered based on type  and severity of pain and evaluate response  - Implement non-pharmacological measures as appropriate and evaluate response  - Consider cultural and social influences on pain and pain management  - Notify physician/advanced practitioner if interventions unsuccessful or patient reports new pain  - Educate patient/family on pain management process including their role and importance of  reporting pain   - Provide non-pharmacologic/complimentary pain relief interventions  6/20/2025 1438 by Hawk Call RN  Outcome: Progressing  6/20/2025 1438 by Hawk Call RN  Outcome: Progressing     Problem: INFECTION - ADULT  Goal: Absence or prevention of progression during hospitalization  Description: INTERVENTIONS:  - Assess and monitor for signs and symptoms of infection  - Monitor lab/diagnostic results  - Monitor all insertion sites, i.e. indwelling lines, tubes, and drains  - Monitor endotracheal if appropriate and nasal secretions for changes in amount and color  - [place] appropriate cooling/warming therapies per order  - Administer medications as ordered  - Instruct and encourage patient and family to use good hand hygiene technique  - Identify and instruct in appropriate isolation precautions for identified infection/condition  6/20/2025 1438 by Hawk Call RN  Outcome: Progressing  6/20/2025 1438 by Hawk Call RN  Outcome: Progressing  Goal: Absence of fever/infection during neutropenic period  Description: INTERVENTIONS:  - Monitor WBC  - Perform strict hand hygiene  - Limit to healthy visitors only  - No plants, dried, fresh or silk flowers with carrion in patient room  6/20/2025 1438 by Hawk Call RN  Outcome: Progressing  6/20/2025 1438 by Hawk Call RN  Outcome: Progressing     Problem: SAFETY ADULT  Goal: Patient will remain free of falls  Description: INTERVENTIONS:  - Educate patient/family on patient safety including physical limitations  - Instruct patient to call for  assistance with activity   - Consider consulting OT/PT to assist with strengthening/mobility based on AM PAC & JH-HLM score  - Consult OT/PT to assist with strengthening/mobility   - Keep Call bell within reach  - Keep bed low and locked with side rails adjusted as appropriate  - Keep care items and personal belongings within reach  - Initiate and maintain comfort rounds  - Make Fall Risk Sign visible to staff    - Apply yellow socks and bracelet for high fall risk patients  - Consider moving patient to room near nurses station  6/20/2025 1438 by Hawk Call RN  Outcome: Progressing  6/20/2025 1438 by Hawk Call RN  Outcome: Progressing  Goal: Maintain or return to baseline ADL function  Description: INTERVENTIONS:  -  Assess patient's ability to carry out ADLs; assess patient's baseline for ADL function and identify physical deficits which impact ability to perform ADLs (bathing, care of mouth/teeth, toileting, grooming, dressing, etc.)  - Assess/evaluate cause of self-care deficits   - Assess range of motion  - Assess patient's mobility; develop plan if impaired  - Assess patient's need for assistive devices and provide as appropriate  - Encourage maximum independence but intervene and supervise when necessary  - Involve family in performance of ADLs  - Assess for home care needs following discharge   - Consider OT consult to assist with ADL evaluation and planning for discharge  - Provide patient education as appropriate  - Monitor functional capacity and physical performance, use of AM PAC & JH-HLM   - Monitor gait, balance and fatigue with ambulation    6/20/2025 1438 by Hawk Call RN  Outcome: Progressing  6/20/2025 1438 by Hawk Call RN  Outcome: Progressing  Goal: Maintains/Returns to pre admission functional level  Description: INTERVENTIONS:  - Perform AM-PAC 6 Click Basic Mobility/ Daily Activity assessment daily.  - Set and communicate daily mobility goal to care team and  patient/family/caregiver.   - Collaborate with rehabilitation services on mobility goals if consulted    - Record patient progress and toleration of activity level   6/20/2025 1438 by Hawk Call RN  Outcome: Progressing  6/20/2025 1438 by Hawk Call RN  Outcome: Progressing     Problem: DISCHARGE PLANNING  Goal: Discharge to home or other facility with appropriate resources  Description: INTERVENTIONS:  - Identify barriers to discharge w/patient and caregiver  - Arrange for needed discharge resources and transportation as appropriate  - Identify discharge learning needs (meds, wound care, etc.)  - Arrange for interpretive services to assist at discharge as needed  - Refer to Case Management Department for coordinating discharge planning if the patient needs post-hospital services based on physician/advanced practitioner order or complex needs related to functional status, cognitive ability, or social support system  6/20/2025 1438 by Hawk Call RN  Outcome: Progressing  6/20/2025 1438 by Hawk Call RN  Outcome: Progressing     Problem: Knowledge Deficit  Goal: Patient/family/caregiver demonstrates understanding of disease process, treatment plan, medications, and discharge instructions  Description: Complete learning assessment and assess knowledge base.  Interventions:  - Provide teaching at level of understanding  - Provide teaching via preferred learning methods  6/20/2025 1438 by Hawk Call RN  Outcome: Progressing  6/20/2025 1438 by Hawk Call RN  Outcome: Progressing     Problem: Prexisting or High Potential for Compromised Skin Integrity  Goal: Skin integrity is maintained or improved  Description: INTERVENTIONS:  - Identify patients at risk for skin breakdown  - Assess and monitor skin integrity including under and around medical devices   - Assess and monitor nutrition and hydration status  - Monitor labs  - Assess for incontinence   - Turn and reposition  patient  - Assist with mobility/ambulation  - Relieve pressure over [place] prominences   - Avoid friction and shearing  - Provide appropriate hygiene as needed including keeping skin clean and dry  - Evaluate need for skin moisturizer/barrier cream  - Collaborate with interdisciplinary team  - Patient/family teaching  - Consider wound care consult    6/20/2025 1438 by Hawk Call RN  Outcome: Progressing  6/20/2025 1438 by Hawk Call RN  Outcome: Progressing

## 2025-06-20 NOTE — PROGRESS NOTES
Progress Note - Hospitalist   Name: Alexis Dennison 64 y.o. 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 coli bacteremia, LETY, and transaminitis.    Presents for inpatient chemotherapy with methotrexate and rituximab  Management per hematology/oncology service  Monitoring MTX levels  Type 2 diabetes mellitus with hyperglycemia, without long-term current use of insulin (HCC)  Controlled with last hemoglobin A1c in February 6 0.6%  Hold home oral regimen  Monitor on sliding scale  Consistent carb diet  Hypoglycemia protocol  Continue lantus 10 units    HTN, goal below 130/80  Not on any antihypertensives  Continue to monitor  Mixed hyperlipidemia  Continue statin  UTI (urinary tract infection)  UA and culture are negative for growth  Observe off antibiotics  Dental infection  Has been taking amoxicillin TID since 6/12, completed his antibiotic course    VTE Pharmacologic Prophylaxis: VTE Score: 4 Moderate Risk (Score 3-4) - Pharmacological DVT Prophylaxis Ordered: heparin.    Mobility:   Basic Mobility Inpatient Raw Score: 18  JH-HLM Goal: 6: Walk 10 steps or more  JH-HLM Achieved: 8: Walk 250 feet ot more  JH-HLM Goal NOT achieved. Continue with multidisciplinary rounding and encourage appropriate mobility to improve upon JH-HLM goals.    Patient Centered Rounds: I performed bedside rounds with nursing staff today.   Discussions with Specialists or Other Care Team Provider: nurse, CM, oncology    Current Length of Stay: 4 day(s)  Current Patient Status: Inpatient   Certification Statement: The patient will continue to require additional inpatient  hospital stay due to IP chemotherapy  Discharge Plan: Anticipate discharge in 24-48 hrs to home.    Code Status: Level 1 - Full Code    Subjective   Denies any complaints today    Objective :  Temp:  [97.9 °F (36.6 °C)-98.8 °F (37.1 °C)] 98.2 °F (36.8 °C)  HR:  [60-78] 78  BP: (131-163)/(70-82) 131/75  Resp:  [15-17] 17  SpO2:  [95 %-99 %] 98 %  O2 Device: None (Room air)    Body mass index is 25.43 kg/m².     Input and Output Summary (last 24 hours):     Intake/Output Summary (Last 24 hours) at 6/20/2025 1417  Last data filed at 6/20/2025 0926  Gross per 24 hour   Intake 4605.83 ml   Output 3400 ml   Net 1205.83 ml       Physical Exam  Constitutional:       Appearance: Normal appearance.   HENT:      Head: Normocephalic and atraumatic.      Nose: Nose normal.     Eyes:      Extraocular Movements: Extraocular movements intact.       Cardiovascular:      Rate and Rhythm: Normal rate and regular rhythm.   Pulmonary:      Effort: Pulmonary effort is normal.      Breath sounds: No wheezing or rales.     Musculoskeletal:      Right lower leg: No edema.      Left lower leg: No edema.     Neurological:      Mental Status: He is alert and oriented to person, place, and time.     Psychiatric:         Mood and Affect: Mood normal.         Behavior: Behavior normal.           Lines/Drains:  Lines/Drains/Airways       Active Status       Name Placement date Placement time Site Days    PICC Line 06/16/25 Left Brachial 06/16/25  1652  Brachial  3                    Central Line:  Goal for removal: Port accessed. Will de-access as appropriate.               Lab Results: I have reviewed the following results:   Results from last 7 days   Lab Units 06/19/25  0512   WBC Thousand/uL 8.95   HEMOGLOBIN g/dL 8.8*   HEMATOCRIT % 27.4*   PLATELETS Thousands/uL 267   SEGS PCT % 65   LYMPHO PCT % 27   MONO PCT % 5   EOS PCT % 1     Results from last 7 days   Lab Units 06/19/25  0512 06/17/25  0444 06/16/25  1127   SODIUM mmol/L 143   < > 137    POTASSIUM mmol/L 3.5   < > 4.0   CHLORIDE mmol/L 103   < > 102   CO2 mmol/L 33*   < > 30   BUN mg/dL 13   < > 19   CREATININE mg/dL 0.94   < > 1.01   ANION GAP mmol/L 7   < > 5   CALCIUM mg/dL 8.5   < > 9.0   ALBUMIN g/dL  --   --  4.0   TOTAL BILIRUBIN mg/dL  --   --  0.30   ALK PHOS U/L  --   --  77   ALT U/L  --   --  10   AST U/L  --   --  12*   GLUCOSE RANDOM mg/dL 120   < > 203*    < > = values in this interval not displayed.         Results from last 7 days   Lab Units 06/20/25  1106 06/20/25  0814 06/19/25  2035 06/19/25  1642 06/19/25  1201 06/19/25  0757 06/18/25  2103 06/18/25  1631 06/18/25  1127 06/18/25  0831 06/18/25  0026 06/17/25  2317   POC GLUCOSE mg/dl 108 111 161* 157* 110 113 277* 249* 130 126 356* 405*     Results from last 7 days   Lab Units 06/16/25  1127   HEMOGLOBIN A1C % 5.5           Recent Cultures (last 7 days):   Results from last 7 days   Lab Units 06/16/25  1659   URINE CULTURE  No Growth <1000 cfu/mL       Imaging Results Review: No pertinent imaging studies reviewed.  Other Study Results Review: No additional pertinent studies reviewed.    Last 24 Hours Medication List:     Current Facility-Administered Medications:     acetaminophen (TYLENOL) tablet 650 mg, Q6H PRN    allopurinol (ZYLOPRIM) tablet 300 mg, Daily    alteplase (CATHFLO) injection 2 mg, Q1MIN PRN    atorvastatin (LIPITOR) tablet 20 mg, Daily    docusate sodium (COLACE) capsule 100 mg, BID    enoxaparin (LOVENOX) subcutaneous injection 40 mg, Daily    insulin glargine (LANTUS) subcutaneous injection 10 Units 0.1 mL, QAM    insulin lispro (HumALOG/ADMELOG) 100 units/mL subcutaneous injection 1-6 Units, 4x Daily (AC & HS) **AND** Fingerstick Glucose (POCT), 4x Daily AC and at bedtime    leucovorin (WELLCOVORIN) tablet 25 mg, Q6H    ondansetron (ZOFRAN) injection 4 mg, Q6H PRN    pantoprazole (PROTONIX) EC tablet 40 mg, Early Morning    QUEtiapine (SEROquel) tablet 12.5 mg, BID    senna (SENOKOT) tablet 17.2 mg, Daily  With Lunch    sodium bicarbonate 100 mEq in dextrose 5 % 1,000 mL infusion, Continuous, Last Rate: 100 mL/hr (06/20/25 0159)    sodium chloride 0.9 % infusion, Once PRN    sodium chloride 0.9 % infusion, Once PRN, Last Rate: 20 mL/hr (06/17/25 1152)    Administrative Statements   Today, Patient Was Seen By: Raul Langley MD  I have spent a total time of 40 minutes in caring for this patient on the day of the visit/encounter including Diagnostic results, Instructions for management, Patient and family education, Impressions, Counseling / Coordination of care, Documenting in the medical record, Reviewing/placing orders in the medical record (including tests, medications, and/or procedures), Obtaining or reviewing history  , and Communicating with other healthcare professionals .    **Please Note: This note may have been constructed using a voice recognition system.**

## 2025-06-20 NOTE — ASSESSMENT & PLAN NOTE
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 coli bacteremia, LETY, and transaminitis.    Presents for inpatient chemotherapy with methotrexate and rituximab  Management per hematology/oncology service  Monitoring MTX levels

## 2025-06-21 LAB
ANION GAP SERPL CALCULATED.3IONS-SCNC: 4 MMOL/L (ref 4–13)
BACTERIA UR QL AUTO: NORMAL /HPF
BACTERIA UR QL AUTO: NORMAL /HPF
BILIRUB UR QL STRIP: NEGATIVE
BILIRUB UR QL STRIP: NEGATIVE
BUN SERPL-MCNC: 14 MG/DL (ref 5–25)
CALCIUM SERPL-MCNC: 9 MG/DL (ref 8.4–10.2)
CHLORIDE SERPL-SCNC: 101 MMOL/L (ref 96–108)
CLARITY UR: CLEAR
CLARITY UR: CLEAR
CO2 SERPL-SCNC: 34 MMOL/L (ref 21–32)
COLOR UR: COLORLESS
COLOR UR: COLORLESS
CREAT SERPL-MCNC: 0.9 MG/DL (ref 0.6–1.3)
ERYTHROCYTE [DISTWIDTH] IN BLOOD BY AUTOMATED COUNT: 16.9 % (ref 11.6–15.1)
GFR SERPL CREATININE-BSD FRML MDRD: 89 ML/MIN/1.73SQ M
GLUCOSE SERPL-MCNC: 105 MG/DL (ref 65–140)
GLUCOSE SERPL-MCNC: 124 MG/DL (ref 65–140)
GLUCOSE SERPL-MCNC: 125 MG/DL (ref 65–140)
GLUCOSE SERPL-MCNC: 144 MG/DL (ref 65–140)
GLUCOSE SERPL-MCNC: 168 MG/DL (ref 65–140)
GLUCOSE UR STRIP-MCNC: NEGATIVE MG/DL
GLUCOSE UR STRIP-MCNC: NEGATIVE MG/DL
HCT VFR BLD AUTO: 28.2 % (ref 36.5–49.3)
HGB BLD-MCNC: 9.1 G/DL (ref 12–17)
HGB UR QL STRIP.AUTO: NEGATIVE
HGB UR QL STRIP.AUTO: NEGATIVE
KETONES UR STRIP-MCNC: NEGATIVE MG/DL
KETONES UR STRIP-MCNC: NEGATIVE MG/DL
LEUKOCYTE ESTERASE UR QL STRIP: NEGATIVE
LEUKOCYTE ESTERASE UR QL STRIP: NEGATIVE
MAGNESIUM SERPL-MCNC: 2.1 MG/DL (ref 1.9–2.7)
MCH RBC QN AUTO: 30.3 PG (ref 26.8–34.3)
MCHC RBC AUTO-ENTMCNC: 32.3 G/DL (ref 31.4–37.4)
MCV RBC AUTO: 94 FL (ref 82–98)
MTX SERPL-SCNC: 0.14 UMOL/L
NITRITE UR QL STRIP: NEGATIVE
NITRITE UR QL STRIP: NEGATIVE
NON-SQ EPI CELLS URNS QL MICRO: NORMAL /HPF
NON-SQ EPI CELLS URNS QL MICRO: NORMAL /HPF
PH UR STRIP.AUTO: 8 [PH]
PH UR STRIP.AUTO: 8 [PH]
PHOSPHATE SERPL-MCNC: 3.6 MG/DL (ref 2.3–4.1)
PLATELET # BLD AUTO: 243 THOUSANDS/UL (ref 149–390)
PMV BLD AUTO: 10.4 FL (ref 8.9–12.7)
POTASSIUM SERPL-SCNC: 3.9 MMOL/L (ref 3.5–5.3)
PROT UR STRIP-MCNC: NEGATIVE MG/DL
PROT UR STRIP-MCNC: NEGATIVE MG/DL
RBC # BLD AUTO: 3 MILLION/UL (ref 3.88–5.62)
RBC #/AREA URNS AUTO: NORMAL /HPF
RBC #/AREA URNS AUTO: NORMAL /HPF
SODIUM SERPL-SCNC: 139 MMOL/L (ref 135–147)
SP GR UR STRIP.AUTO: 1.01 (ref 1–1.03)
SP GR UR STRIP.AUTO: 1.01 (ref 1–1.03)
UROBILINOGEN UR STRIP-ACNC: <2 MG/DL
UROBILINOGEN UR STRIP-ACNC: <2 MG/DL
WBC # BLD AUTO: 6.76 THOUSAND/UL (ref 4.31–10.16)
WBC #/AREA URNS AUTO: NORMAL /HPF
WBC #/AREA URNS AUTO: NORMAL /HPF

## 2025-06-21 PROCEDURE — 84100 ASSAY OF PHOSPHORUS: CPT | Performed by: INTERNAL MEDICINE

## 2025-06-21 PROCEDURE — 99232 SBSQ HOSP IP/OBS MODERATE 35: CPT | Performed by: INTERNAL MEDICINE

## 2025-06-21 PROCEDURE — 80048 BASIC METABOLIC PNL TOTAL CA: CPT | Performed by: INTERNAL MEDICINE

## 2025-06-21 PROCEDURE — 81001 URINALYSIS AUTO W/SCOPE: CPT | Performed by: PHYSICIAN ASSISTANT

## 2025-06-21 PROCEDURE — 82948 REAGENT STRIP/BLOOD GLUCOSE: CPT

## 2025-06-21 PROCEDURE — 83735 ASSAY OF MAGNESIUM: CPT | Performed by: INTERNAL MEDICINE

## 2025-06-21 PROCEDURE — 85027 COMPLETE CBC AUTOMATED: CPT | Performed by: INTERNAL MEDICINE

## 2025-06-21 PROCEDURE — 80204 DRUG ASSAY METHOTREXATE: CPT | Performed by: INTERNAL MEDICINE

## 2025-06-21 RX ADMIN — LEUCOVORIN CALCIUM 25 MG: 25 TABLET ORAL at 19:57

## 2025-06-21 RX ADMIN — INSULIN LISPRO 1 UNITS: 100 INJECTION, SOLUTION INTRAVENOUS; SUBCUTANEOUS at 17:52

## 2025-06-21 RX ADMIN — LEUCOVORIN CALCIUM 25 MG: 25 TABLET ORAL at 01:34

## 2025-06-21 RX ADMIN — SODIUM BICARBONATE 100 ML/HR: 84 INJECTION, SOLUTION INTRAVENOUS at 22:34

## 2025-06-21 RX ADMIN — LEUCOVORIN CALCIUM 25 MG: 25 TABLET ORAL at 14:11

## 2025-06-21 RX ADMIN — DOCUSATE SODIUM 100 MG: 100 CAPSULE, LIQUID FILLED ORAL at 17:55

## 2025-06-21 RX ADMIN — QUETIAPINE FUMARATE 12.5 MG: 25 TABLET ORAL at 12:49

## 2025-06-21 RX ADMIN — PANTOPRAZOLE SODIUM 40 MG: 40 TABLET, DELAYED RELEASE ORAL at 05:53

## 2025-06-21 RX ADMIN — QUETIAPINE FUMARATE 12.5 MG: 25 TABLET ORAL at 19:57

## 2025-06-21 RX ADMIN — INSULIN GLARGINE 10 UNITS: 100 INJECTION, SOLUTION SUBCUTANEOUS at 09:16

## 2025-06-21 RX ADMIN — SODIUM BICARBONATE 100 ML/HR: 84 INJECTION, SOLUTION INTRAVENOUS at 01:34

## 2025-06-21 RX ADMIN — LEUCOVORIN CALCIUM 25 MG: 25 TABLET ORAL at 09:17

## 2025-06-21 RX ADMIN — SENNOSIDES 17.2 MG: 8.6 TABLET, FILM COATED ORAL at 12:49

## 2025-06-21 RX ADMIN — DOCUSATE SODIUM 100 MG: 100 CAPSULE, LIQUID FILLED ORAL at 09:17

## 2025-06-21 RX ADMIN — ALLOPURINOL 300 MG: 300 TABLET ORAL at 09:17

## 2025-06-21 RX ADMIN — ENOXAPARIN SODIUM 40 MG: 40 INJECTION SUBCUTANEOUS at 09:16

## 2025-06-21 RX ADMIN — SODIUM BICARBONATE 100 ML/HR: 84 INJECTION, SOLUTION INTRAVENOUS at 12:47

## 2025-06-21 RX ADMIN — ATORVASTATIN CALCIUM 20 MG: 20 TABLET, FILM COATED ORAL at 09:17

## 2025-06-21 NOTE — ASSESSMENT & PLAN NOTE
Lab Results   Component Value Date    HGBA1C 5.5 06/16/2025       Recent Labs     06/20/25  1106 06/20/25  1644 06/20/25 2028 06/21/25  0815   POCGLU 108 152* 137 125       Blood Sugar Average: Last 72 hrs:  (P) [id number]

## 2025-06-21 NOTE — PROGRESS NOTES
Progress Note - Hospitalist   Name: Alexis Dennison 64 y.o. male I MRN: [id number]  Unit/Bed#: [facility] 916-01 I Date of Admission: 6/16/2025   Date of Service: 6/21/2025 I Hospital Day: 5    Assessment & Plan  Primary central nervous system (CNS) lymphoma  recently-diagnosed primary CNS (Dx 4/14/2025, double expressor (BCL2+/MYC+), DLBCL NOS, non-germinal center, Ki-67 60-70, BCL6 rearrangement   treatment course of high-dose MTX + rituximab complicated by sepsis due to recurrent E. coli bacteremia, LETY, and transaminitis.    Presents for inpatient chemotherapy with methotrexate and rituximab  Management per hematology/oncology service  Monitoring MTX levels  Type 2 diabetes mellitus with hyperglycemia, without long-term current use of insulin (HCC)  Controlled with last hemoglobin A1c in February 6 0.6%  Hold home oral regimen  Monitor on sliding scale  Consistent carb diet  Hypoglycemia protocol  Continue lantus 10 units    HTN, goal below 130/80  Not on any antihypertensives  Continue to monitor  Mixed hyperlipidemia  Continue statin  UTI (urinary tract infection)  UA and culture are negative for growth  Observe off antibiotics  Dental infection  Has been taking amoxicillin TID since 6/12, completed his antibiotic course    VTE Pharmacologic Prophylaxis: VTE Score: 4 Moderate Risk (Score 3-4) - Pharmacological DVT Prophylaxis Ordered: heparin.    Mobility:   Basic Mobility Inpatient Raw Score: 18  JH-HLM Goal: 6: Walk 10 steps or more  JH-HLM Achieved: 8: Walk 250 feet ot more  JH-HLM Goal NOT achieved. Continue with multidisciplinary rounding and encourage appropriate mobility to improve upon JH-HLM goals.    Patient Centered Rounds: I performed bedside rounds with nursing staff today.   Discussions with Specialists or Other Care Team Provider: nurse, CM, oncology    Current Length of Stay: 5 day(s)  Current Patient Status: Inpatient   Certification Statement: The patient will continue to require additional inpatient  hospital stay due to IP chemotherapy  Discharge Plan: Anticipate discharge in 24-48 hrs to home.    Code Status: Level 1 - Full Code    Subjective   Denies any complaints today    Objective :  Temp:  [98 °F (36.7 °C)-98.6 °F (37 °C)] 98.6 °F (37 °C)  HR:  [66-77] 69  BP: (117-137)/(63-76) 117/63  Resp:  [16-18] 17  SpO2:  [98 %-100 %] 99 %  O2 Device: None (Room air)    Body mass index is 25.43 kg/m².     Input and Output Summary (last 24 hours):     Intake/Output Summary (Last 24 hours) at 6/21/2025 1428  Last data filed at 6/21/2025 1414  Gross per 24 hour   Intake 3851.66 ml   Output 5570 ml   Net -1718.34 ml       Physical Exam  Constitutional:       Appearance: Normal appearance.   HENT:      Head: Normocephalic and atraumatic.      Nose: Nose normal.     Eyes:      Extraocular Movements: Extraocular movements intact.       Cardiovascular:      Rate and Rhythm: Normal rate and regular rhythm.   Pulmonary:      Effort: Pulmonary effort is normal.      Breath sounds: No wheezing or rales.     Musculoskeletal:      Right lower leg: No edema.      Left lower leg: No edema.     Neurological:      Mental Status: He is alert and oriented to person, place, and time.     Psychiatric:         Mood and Affect: Mood normal.         Behavior: Behavior normal.           Lines/Drains:  Lines/Drains/Airways       Active Status       Name Placement date Placement time Site Days    PICC Line 06/16/25 Left Brachial 06/16/25  1652  Brachial  4                    Central Line:  Goal for removal: Port accessed. Will de-access as appropriate.               Lab Results: I have reviewed the following results:   Results from last 7 days   Lab Units 06/21/25  0619 06/19/25  0512   WBC Thousand/uL 6.76 8.95   HEMOGLOBIN g/dL 9.1* 8.8*   HEMATOCRIT % 28.2* 27.4*   PLATELETS Thousands/uL 243 267   SEGS PCT %  --  65   LYMPHO PCT %  --  27   MONO PCT %  --  5   EOS PCT %  --  1     Results from last 7 days   Lab Units 06/21/25  0716  06/17/25  0444 06/16/25  1127   SODIUM mmol/L 139   < > 137   POTASSIUM mmol/L 3.9   < > 4.0   CHLORIDE mmol/L 101   < > 102   CO2 mmol/L 34*   < > 30   BUN mg/dL 14   < > 19   CREATININE mg/dL 0.90   < > 1.01   ANION GAP mmol/L 4   < > 5   CALCIUM mg/dL 9.0   < > 9.0   ALBUMIN g/dL  --   --  4.0   TOTAL BILIRUBIN mg/dL  --   --  0.30   ALK PHOS U/L  --   --  77   ALT U/L  --   --  10   AST U/L  --   --  12*   GLUCOSE RANDOM mg/dL 124   < > 203*    < > = values in this interval not displayed.         Results from last 7 days   Lab Units 06/21/25  1058 06/21/25  0815 06/20/25  2028 06/20/25  1644 06/20/25  1106 06/20/25  0814 06/19/25  2035 06/19/25  1642 06/19/25  1201 06/19/25  0757 06/18/25  2103 06/18/25  1631   POC GLUCOSE mg/dl 105 125 137 152* 108 111 161* 157* 110 113 277* 249*     Results from last 7 days   Lab Units 06/16/25  1127   HEMOGLOBIN A1C % 5.5           Recent Cultures (last 7 days):   Results from last 7 days   Lab Units 06/16/25  1659   URINE CULTURE  No Growth <1000 cfu/mL       Imaging Results Review: No pertinent imaging studies reviewed.  Other Study Results Review: No additional pertinent studies reviewed.    Last 24 Hours Medication List:     Current Facility-Administered Medications:     acetaminophen (TYLENOL) tablet 650 mg, Q6H PRN    allopurinol (ZYLOPRIM) tablet 300 mg, Daily    alteplase (CATHFLO) injection 2 mg, Q1MIN PRN    atorvastatin (LIPITOR) tablet 20 mg, Daily    docusate sodium (COLACE) capsule 100 mg, BID    enoxaparin (LOVENOX) subcutaneous injection 40 mg, Daily    insulin glargine (LANTUS) subcutaneous injection 10 Units 0.1 mL, QAM    insulin lispro (HumALOG/ADMELOG) 100 units/mL subcutaneous injection 1-6 Units, 4x Daily (AC & HS) **AND** Fingerstick Glucose (POCT), 4x Daily AC and at bedtime    leucovorin (WELLCOVORIN) tablet 25 mg, Q6H    ondansetron (ZOFRAN) injection 4 mg, Q6H PRN    pantoprazole (PROTONIX) EC tablet 40 mg, Early Morning    QUEtiapine (SEROquel)  tablet 12.5 mg, BID    senna (SENOKOT) tablet 17.2 mg, Daily With Lunch    sodium bicarbonate 100 mEq in dextrose 5 % 1,000 mL infusion, Continuous, Last Rate: 100 mL/hr (06/21/25 1247)    sodium chloride 0.9 % infusion, Once PRN    sodium chloride 0.9 % infusion, Once PRN, Last Rate: 20 mL/hr (06/17/25 1152)    Administrative Statements   Today, Patient Was Seen By: Raul Langley MD  I have spent a total time of 40 minutes in caring for this patient on the day of the visit/encounter including Diagnostic results, Instructions for management, Patient and family education, Impressions, Counseling / Coordination of care, Documenting in the medical record, Reviewing/placing orders in the medical record (including tests, medications, and/or procedures), Obtaining or reviewing history  , and Communicating with other healthcare professionals .    **Please Note: This note may have been constructed using a voice recognition system.**

## 2025-06-21 NOTE — ASSESSMENT & PLAN NOTE
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 Established with Dr. Phan      On Q-14 days High-dose IV Methotrexate   C1 (4/18) 8 g/m²  C2 (5/2) 3 g/m², dose-reduced due to LETY, delayed x1 day due to E. coli bacteremia   C3 (5/21) 3 g/m²   C4 : 3 g/m² ; given on 6/17/25     S/p Rituximab x 5 doses 4/17/25 - 5/23/25 ; received rituximab 6/19/25 ; and tolerated it well      Patient had prescription for temozolomide total to 90 mg a day on days 7 through 11 (6/23 - 6/27) ; patient and his wife made aware and they confirmed having the medication available at home     PLAN   - Methotrexate level from yesterday, down to 0.14.   -Continue to monitor methotrexate daily until levels trend less than 0.05.   - Urine alkalization per protocol ; IV Na Bicarb, Q6H UA , keep PH > 7

## 2025-06-21 NOTE — PROGRESS NOTES
Progress Note - Oncology-Medical   Name: Alexis Dennison 64 y.o. 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 Established with Dr. Phan      On Q-14 days High-dose IV Methotrexate   C1 (4/18) 8 g/m²  C2 (5/2) 3 g/m², dose-reduced due to LETY, delayed x1 day due to E. coli bacteremia   C3 (5/21) 3 g/m²   C4 : 3 g/m² ; given on 6/17/25     S/p Rituximab x 5 doses 4/17/25 - 5/23/25 ; received rituximab 6/19/25 ; and tolerated it well      Patient had prescription for temozolomide total to 90 mg a day on days 7 through 11 (6/23 - 6/27) ; patient and his wife made aware and they confirmed having the medication available at home     PLAN   - Methotrexate level from yesterday, down to 0.14.   -Continue to monitor methotrexate daily until levels trend less than 0.05.   - Urine alkalization per protocol ; IV Na Bicarb, Q6H UA , keep PH > 7      Outpatient follow up plan: Patient has outpatient appointment scheduled with Dr. Her catalan on 7/18/2025.    Communication with patient/family:  I did update the patient.    Communication with team:  Did communicate with primary team.    I did review this patient with Dr. Lee and they are in agreement with this plan.          Subjective:  Patient seen at bedside, no active complaints to offer.  We discussed lab results and need for close monitoring in the hospital.  He is agreeable with the plan.    Review of Systems   Constitutional:  Negative for activity change, fatigue and unexpected weight change.   Respiratory:  Negative for chest tightness and shortness of breath.    Cardiovascular:  Negative for chest pain and palpitations.   Gastrointestinal:  Negative for abdominal pain and constipation.   Musculoskeletal:  Negative for back pain and  myalgias.   All other systems reviewed and are negative.         Objective:     Medication Administration - last 24 hours from 06/20/2025 1113 to 06/21/2025 1113         Date/Time Order Dose Route Action Action by     06/21/2025 0916 EDT enoxaparin (LOVENOX) subcutaneous injection 40 mg 40 mg Subcutaneous Given Jono Higginbotham RN     06/21/2025 0917 EDT allopurinol (ZYLOPRIM) tablet 300 mg 300 mg Oral Given Jono Higginbotham RN     06/21/2025 0917 EDT atorvastatin (LIPITOR) tablet 20 mg 20 mg Oral Given Jono Higginbotham RN     06/21/2025 0917 EDT docusate sodium (COLACE) capsule 100 mg 100 mg Oral Given Jono Higginbotham RN     06/20/2025 1654 EDT docusate sodium (COLACE) capsule 100 mg 100 mg Oral Given Hawk Call RN     06/21/2025 0553 EDT pantoprazole (PROTONIX) EC tablet 40 mg 40 mg Oral Given Kate Hooper RN     06/20/2025 2018 EDT QUEtiapine (SEROquel) tablet 12.5 mg 12.5 mg Oral Given Kate Hooper RN     06/20/2025 1223 EDT QUEtiapine (SEROquel) tablet 12.5 mg 12.5 mg Oral Given Hawk Call RN     06/20/2025 1223 EDT senna (SENOKOT) tablet 17.2 mg 17.2 mg Oral Given Hawk Call RN     06/21/2025 0134 EDT sodium bicarbonate 100 mEq in dextrose 5 % 1,000 mL infusion 100 mL/hr Intravenous New Bag Kate Hooper RN     06/21/2025 0130 EDT sodium bicarbonate 100 mEq in dextrose 5 % 1,000 mL infusion 0 mL/hr Intravenous Stopped Kate Hooper RN     06/20/2025 1441 EDT sodium bicarbonate 100 mEq in dextrose 5 % 1,000 mL infusion 100 mL/hr Intravenous New Bag Hawk Call RN     06/21/2025 1109 EDT insulin lispro (HumALOG/ADMELOG) 100 units/mL subcutaneous injection 1-6 Units -- Subcutaneous Not Given Jono Higginbotham RN     06/21/2025 0915 EDT insulin lispro (HumALOG/ADMELOG) 100 units/mL subcutaneous injection 1-6 Units -- Subcutaneous Not Given Jono Higginbotham RN     06/20/2025 2200 EDT insulin lispro (HumALOG/ADMELOG) 100  "units/mL subcutaneous injection 1-6 Units 0 Units Subcutaneous Hold Kate Hooper RN     06/20/2025 1654 EDT insulin lispro (HumALOG/ADMELOG) 100 units/mL subcutaneous injection 1-6 Units 1 Units Subcutaneous Given Hawk Call RN     06/20/2025 1208 EDT insulin lispro (HumALOG/ADMELOG) 100 units/mL subcutaneous injection 1-6 Units -- Subcutaneous Not Given Marcus Queen RN     06/21/2025 0916 EDT insulin glargine (LANTUS) subcutaneous injection 10 Units 0.1 mL 10 Units Subcutaneous Given Jono Higginbotham RN     06/21/2025 0917 EDT leucovorin (WELLCOVORIN) tablet 25 mg 25 mg Oral Given Jono Higginbotham RN     06/21/2025 0134 EDT leucovorin (WELLCOVORIN) tablet 25 mg 25 mg Oral Given Kate Hooper RN     06/20/2025 2018 EDT leucovorin (WELLCOVORIN) tablet 25 mg 25 mg Oral Given Kate Hooper RN     06/20/2025 1343 EDT leucovorin (WELLCOVORIN) tablet 25 mg 25 mg Oral Given Hawk Call RN            /75   Pulse 66   Temp 98.1 °F (36.7 °C)   Resp 17   Ht 5' 10\" (1.778 m)   Wt 80.4 kg (177 lb 4 oz)   SpO2 100%   BMI 25.43 kg/m²       Physical Exam  Constitutional:       Appearance: Normal appearance.   HENT:      Head: Normocephalic and atraumatic.     Eyes:      Conjunctiva/sclera: Conjunctivae normal.      Pupils: Pupils are equal, round, and reactive to light.       Cardiovascular:      Rate and Rhythm: Normal rate and regular rhythm.      Pulses: Normal pulses.      Heart sounds: Normal heart sounds.   Abdominal:      General: Abdomen is flat.     Musculoskeletal:         General: Normal range of motion.     Skin:     General: Skin is warm and dry.     Neurological:      Mental Status: He is alert and oriented to person, place, and time.           Recent Results (from the past 48 hours)   Urinalysis with microscopic    Collection Time: 06/19/25 11:16 AM   Result Value Ref Range    Color, UA Colorless     Clarity, UA Clear     Specific Gravity, UA 1.008 1.003 - " 1.030    pH, UA 8.0 4.5, 5.0, 5.5, 6.0, 6.5, 7.0, 7.5, 8.0    Leukocytes, UA Negative Negative    Nitrite, UA Negative Negative    Protein, UA Negative Negative mg/dl    Glucose, UA Negative Negative mg/dl    Ketones, UA Negative Negative mg/dl    Urobilinogen, UA <2.0 <2.0 mg/dl mg/dl    Bilirubin, UA Negative Negative    Occult Blood, UA Negative Negative    RBC, UA None Seen None Seen, 1-2 /hpf    WBC, UA 1-2 None Seen, 1-2 /hpf    Epithelial Cells None Seen None Seen, Occasional /hpf    Bacteria, UA None Seen None Seen, Occasional /hpf   Fingerstick Glucose (POCT)    Collection Time: 06/19/25 12:01 PM   Result Value Ref Range    POC Glucose 110 65 - 140 mg/dl   Methotrexate level    Collection Time: 06/19/25  2:11 PM   Result Value Ref Range    Methotrexate Lvl 0.45 umol/L   Fingerstick Glucose (POCT)    Collection Time: 06/19/25  4:42 PM   Result Value Ref Range    POC Glucose 157 (H) 65 - 140 mg/dl   Urinalysis with microscopic    Collection Time: 06/19/25  4:50 PM   Result Value Ref Range    Color, UA Colorless     Clarity, UA Clear     Specific Gravity, UA 1.003 1.003 - 1.030    pH, UA 7.5 4.5, 5.0, 5.5, 6.0, 6.5, 7.0, 7.5, 8.0    Leukocytes, UA Negative Negative    Nitrite, UA Negative Negative    Protein, UA Negative Negative mg/dl    Glucose, UA Negative Negative mg/dl    Ketones, UA Negative Negative mg/dl    Urobilinogen, UA <2.0 <2.0 mg/dl mg/dl    Bilirubin, UA Negative Negative    Occult Blood, UA Negative Negative    RBC, UA None Seen None Seen, 1-2 /hpf    WBC, UA None Seen None Seen, 1-2 /hpf    Epithelial Cells None Seen None Seen, Occasional /hpf    Bacteria, UA None Seen None Seen, Occasional /hpf   Fingerstick Glucose (POCT)    Collection Time: 06/19/25  8:35 PM   Result Value Ref Range    POC Glucose 161 (H) 65 - 140 mg/dl   Urinalysis with microscopic    Collection Time: 06/19/25  9:58 PM   Result Value Ref Range    Color, UA Colorless     Clarity, UA Clear     Specific Gravity, UA 1.005  1.003 - 1.030    pH, UA 7.5 4.5, 5.0, 5.5, 6.0, 6.5, 7.0, 7.5, 8.0    Leukocytes, UA Negative Negative    Nitrite, UA Negative Negative    Protein, UA Negative Negative mg/dl    Glucose, UA Negative Negative mg/dl    Ketones, UA Negative Negative mg/dl    Urobilinogen, UA <2.0 <2.0 mg/dl mg/dl    Bilirubin, UA Negative Negative    Occult Blood, UA Negative Negative    RBC, UA 1-2 None Seen, 1-2 /hpf    WBC, UA 1-2 None Seen, 1-2 /hpf    Epithelial Cells None Seen None Seen, Occasional /hpf    Bacteria, UA None Seen None Seen, Occasional /hpf   Urinalysis with microscopic    Collection Time: 06/20/25  2:58 AM   Result Value Ref Range    Color, UA Colorless     Clarity, UA Clear     Specific Gravity, UA 1.007 1.003 - 1.030    pH, UA 8.0 4.5, 5.0, 5.5, 6.0, 6.5, 7.0, 7.5, 8.0    Leukocytes, UA Negative Negative    Nitrite, UA Negative Negative    Protein, UA Negative Negative mg/dl    Glucose, UA Negative Negative mg/dl    Ketones, UA Negative Negative mg/dl    Urobilinogen, UA <2.0 <2.0 mg/dl mg/dl    Bilirubin, UA Negative Negative    Occult Blood, UA Negative Negative    RBC, UA None Seen None Seen, 1-2 /hpf    WBC, UA None Seen None Seen, 1-2 /hpf    Epithelial Cells None Seen None Seen, Occasional /hpf    Bacteria, UA None Seen None Seen, Occasional /hpf   Fingerstick Glucose (POCT)    Collection Time: 06/20/25  8:14 AM   Result Value Ref Range    POC Glucose 111 65 - 140 mg/dl   Urinalysis with microscopic    Collection Time: 06/20/25 10:38 AM   Result Value Ref Range    Color, UA Colorless     Clarity, UA Clear     Specific Gravity, UA 1.009 1.003 - 1.030    pH, UA 8.5 (A) 4.5, 5.0, 5.5, 6.0, 6.5, 7.0, 7.5, 8.0    Leukocytes, UA Negative Negative    Nitrite, UA Negative Negative    Protein, UA Negative Negative mg/dl    Glucose, UA Negative Negative mg/dl    Ketones, UA Negative Negative mg/dl    Urobilinogen, UA <2.0 <2.0 mg/dl mg/dl    Bilirubin, UA Negative Negative    Occult Blood, UA Negative Negative     RBC, UA None Seen None Seen, 1-2 /hpf    WBC, UA None Seen None Seen, 1-2 /hpf    Epithelial Cells Occasional None Seen, Occasional /hpf    Bacteria, UA None Seen None Seen, Occasional /hpf   Fingerstick Glucose (POCT)    Collection Time: 06/20/25 11:06 AM   Result Value Ref Range    POC Glucose 108 65 - 140 mg/dl   Methotrexate level    Collection Time: 06/20/25  2:00 PM   Result Value Ref Range    Methotrexate Lvl 0.14 umol/L   Urinalysis with microscopic    Collection Time: 06/20/25  3:46 PM   Result Value Ref Range    Color, UA Colorless     Clarity, UA Clear     Specific Gravity, UA 1.006 1.003 - 1.030    pH, UA 7.5 4.5, 5.0, 5.5, 6.0, 6.5, 7.0, 7.5, 8.0    Leukocytes, UA Negative Negative    Nitrite, UA Negative Negative    Protein, UA Negative Negative mg/dl    Glucose, UA Negative Negative mg/dl    Ketones, UA Negative Negative mg/dl    Urobilinogen, UA <2.0 <2.0 mg/dl mg/dl    Bilirubin, UA Negative Negative    Occult Blood, UA Negative Negative    RBC, UA None Seen None Seen, 1-2 /hpf    WBC, UA None Seen None Seen, 1-2 /hpf    Epithelial Cells None Seen None Seen, Occasional /hpf    Bacteria, UA None Seen None Seen, Occasional /hpf   Fingerstick Glucose (POCT)    Collection Time: 06/20/25  4:44 PM   Result Value Ref Range    POC Glucose 152 (H) 65 - 140 mg/dl   Fingerstick Glucose (POCT)    Collection Time: 06/20/25  8:28 PM   Result Value Ref Range    POC Glucose 137 65 - 140 mg/dl   Urinalysis with microscopic    Collection Time: 06/20/25  9:43 PM   Result Value Ref Range    Color, UA Colorless     Clarity, UA Clear     Specific Gravity, UA 1.007 1.003 - 1.030    pH, UA 8.0 4.5, 5.0, 5.5, 6.0, 6.5, 7.0, 7.5, 8.0    Leukocytes, UA Negative Negative    Nitrite, UA Negative Negative    Protein, UA Negative Negative mg/dl    Glucose, UA Negative Negative mg/dl    Ketones, UA Negative Negative mg/dl    Urobilinogen, UA <2.0 <2.0 mg/dl mg/dl    Bilirubin, UA Negative Negative    Occult Blood, UA Negative  Negative    RBC, UA None Seen None Seen, 1-2 /hpf    WBC, UA 1-2 None Seen, 1-2 /hpf    Epithelial Cells None Seen None Seen, Occasional /hpf    Bacteria, UA None Seen None Seen, Occasional /hpf   Urinalysis with microscopic    Collection Time: 06/21/25  3:51 AM   Result Value Ref Range    Color, UA Colorless     Clarity, UA Clear     Specific Gravity, UA 1.007 1.003 - 1.030    pH, UA 8.0 4.5, 5.0, 5.5, 6.0, 6.5, 7.0, 7.5, 8.0    Leukocytes, UA Negative Negative    Nitrite, UA Negative Negative    Protein, UA Negative Negative mg/dl    Glucose, UA Negative Negative mg/dl    Ketones, UA Negative Negative mg/dl    Urobilinogen, UA <2.0 <2.0 mg/dl mg/dl    Bilirubin, UA Negative Negative    Occult Blood, UA Negative Negative    RBC, UA None Seen None Seen, 1-2 /hpf    WBC, UA None Seen None Seen, 1-2 /hpf    Epithelial Cells Occasional None Seen, Occasional /hpf    Bacteria, UA None Seen None Seen, Occasional /hpf   CBC    Collection Time: 06/21/25  6:19 AM   Result Value Ref Range    WBC 6.76 4.31 - 10.16 Thousand/uL    RBC 3.00 (L) 3.88 - 5.62 Million/uL    Hemoglobin 9.1 (L) 12.0 - 17.0 g/dL    Hematocrit 28.2 (L) 36.5 - 49.3 %    MCV 94 82 - 98 fL    MCH 30.3 26.8 - 34.3 pg    MCHC 32.3 31.4 - 37.4 g/dL    RDW 16.9 (H) 11.6 - 15.1 %    Platelets 243 149 - 390 Thousands/uL    MPV 10.4 8.9 - 12.7 fL   Basic metabolic panel    Collection Time: 06/21/25  7:16 AM   Result Value Ref Range    Sodium 139 135 - 147 mmol/L    Potassium 3.9 3.5 - 5.3 mmol/L    Chloride 101 96 - 108 mmol/L    CO2 34 (H) 21 - 32 mmol/L    ANION GAP 4 4 - 13 mmol/L    BUN 14 5 - 25 mg/dL    Creatinine 0.90 0.60 - 1.30 mg/dL    Glucose 124 65 - 140 mg/dL    Calcium 9.0 8.4 - 10.2 mg/dL    eGFR 89 ml/min/1.73sq m   Magnesium    Collection Time: 06/21/25  7:16 AM   Result Value Ref Range    Magnesium 2.1 1.9 - 2.7 mg/dL   Phosphorus    Collection Time: 06/21/25  7:16 AM   Result Value Ref Range    Phosphorus 3.6 2.3 - 4.1 mg/dL   Fingerstick  Glucose (POCT)    Collection Time: 06/21/25  8:15 AM   Result Value Ref Range    POC Glucose 125 65 - 140 mg/dl   Urinalysis with microscopic    Collection Time: 06/21/25 10:32 AM   Result Value Ref Range    Color, UA Colorless     Clarity, UA Clear     Specific Gravity, UA 1.007 1.003 - 1.030    pH, UA 8.0 4.5, 5.0, 5.5, 6.0, 6.5, 7.0, 7.5, 8.0    Leukocytes, UA Negative Negative    Nitrite, UA Negative Negative    Protein, UA Negative Negative mg/dl    Glucose, UA Negative Negative mg/dl    Ketones, UA Negative Negative mg/dl    Urobilinogen, UA <2.0 <2.0 mg/dl mg/dl    Bilirubin, UA Negative Negative    Occult Blood, UA Negative Negative    RBC, UA None Seen None Seen, 1-2 /hpf    WBC, UA None Seen None Seen, 1-2 /hpf    Epithelial Cells None Seen None Seen, Occasional /hpf    Bacteria, UA None Seen None Seen, Occasional /hpf   Fingerstick Glucose (POCT)    Collection Time: 06/21/25 10:58 AM   Result Value Ref Range    POC Glucose 105 65 - 140 mg/dl       NM PET CT skull base to mid thigh  Result Date: 6/16/2025  Narrative: PET/CT SCAN INDICATION: C83.390: Primary central nervous system lymphoma, restaging MODIFIER: PS COMPARISON: CT of abdomen and pelvis dated 5/18/2025, MRI of the brain, cervical spine, thoracic spine, and lumbar spine dated 4/19/25, CT angiography of the chest, abdomen, and pelvis dated 3/29/2025 CELL TYPE: Involved by pt's large B-cell lymphoma (bx 4/13/25 CSF +) TECHNIQUE:   8.2 mCi F-18-FDG administered IV. Multiplanar attenuation corrected and non attenuation corrected PET images are available for interpretation, and contiguous, low dose, axial CT sections were obtained from the skull vertex through the femurs. Intravenous contrast material was not utilized. This examination, like all CT scans performed in the [facility], was performed utilizing techniques to minimize radiation dose exposure, including the use of iterative reconstruction and automated exposure control.  Fasting serum glucose: 115 mg/dl FINDINGS: VISUALIZED BRAIN: On PET image 37 there is subtle asymmetric focal activity involving the left cerebral hemisphere slightly posterior to the left frontal horn which is difficult to correlate on low-dose unenhanced CT but measures approximately 1.5 cm on PET imaging with max SUV of 7.7; this finding is in the expected location patient's known enhancing intracranial mass which is better characterized on MRI of the brain dated 4/20/25 and could reflect mild hypermetabolic malignant activity versus asymmetric activity involving the left basal ganglia which is in this region. Patient's known intracranial malignancy is better characterized on MRI of the brain given background physiologic brain activity and continued follow-up with MRI of the brain is recommended. HEAD/NECK: On image 20 there is mild FDG activity associated with cutaneous/subcutaneous soft tissue density involving the left frontal scalp which appears superficial to a siomara hole in the left frontal calvarium extending from images 21 through 24 of series 3. On image 20 of series 3 this finding measures approximately 1.3 cm in length with max SUV of 3.9 and possibly reflects inflammatory activity although correlation is recommended to exclude the possibility of cutaneous/subcutaneous malignancy of low metabolic  activity. CT images: Essentially stable prominence to the lateral ventricles which is better characterized on prior dedicated MRI of the brain. Stable asymmetric nodular enlargement of the left thyroid gland which was better characterized on thyroid ultrasound dated 6//25 with recommendation for tissue sampling was made. On image 93 of series 3 there is a nonspecific mildly prominent common nonpathologic in size left perithyroidal/lower cervical lymph node measuring 6 mm in short axis. CHEST: No FDG avid soft tissue lesions are seen. CT images: Trace gynecomastia. Atherosclerotic vascular calcifications  including those of the coronary arteries are noted. Tiny calcified right middle lobe granuloma. On image 129 of series 3 there is a new 6 mm left lower lobe lung nodule, possibly related to adjacent subtle patchy likely inflammatory groundglass airspace disease although given known malignancy, short interval follow-up with CT of chest in 3 months is recommended to ensure stability. ABDOMEN: Fairly diffuse nonspecific nonuniform bowel activity, possibly physiologic which limits evaluation for underlying hypermetabolic bowel malignancy. CT images: Stable right hepatic hypodensity which was better characterized on prior MRI of abdomen where it was characterized as a cyst. Atherosclerotic vascular calcifications are noted. PELVIS: No FDG avid soft tissue lesions are seen. CT images: Enlarged prostate gland. Mild nonspecific diffuse mural thickening of the urinary bladder wall which is difficult to evaluate secondary to under distention. OSSEOUS STRUCTURES: No FDG avid lesions are seen. CT images: No significant findings.     Impression: 1.  Subtle asymmetric focal FDG activity involving the left cerebral hemisphere in the expected location of patient's known enhancing intracranial mass which could reflect mild hypermetabolic malignancy versus asymmetric physiologic activity involving the left basal ganglia. Patient's known intracranial malignancy is better characterized on MRI of the brain and continued follow-up with MRI of the brain is recommended as clinically indicated. 2.  Mild nonspecific FDG activity associated with cutaneous/subcutaneous soft tissue density involving the left frontal scalp which appears superficial to a siomara hole in the left frontal calvarium, possibly reflecting inflammatory activity. Correlation with direct visualization is recommended to exclude the possibility of cutaneous/subcutaneous malignancy of low metabolic activity. 3.  New 6 mm left lower lobe lung nodule, possibly inflammatory.  Short interval follow-up with CT of chest in 3 months is recommended to ensure stability. 4.  Please see above for details and additional findings. The study was marked in EPIC for significant notification. The study was marked in Epic for follow-up. Workstation performed: [id number]     US thyroid  Result Date: 6/11/2025  Narrative: THYROID ULTRASOUND INDICATION: E04.1: Nontoxic single thyroid nodule. COMPARISON: CTA head and neck 3/24/2025 TECHNIQUE: Ultrasound of the thyroid was performed with a high frequency linear transducer in transverse and sagittal planes including volumetric imaging sweeps as well as traditional still imaging technique. FINDINGS: Thyroid texture: Thyroid is mildly inhomogeneous and hypervascular. Right lobe: 5.0 x 1.5 x 1.9 cm. Volume 6.9 mL Left lobe: 6.0 x 3.1 x 2.8 cm. Volume 24.9 mL Isthmus: 0.3 cm. Nodule #1. Image 33. LEFT anterior mid to lower pole nodule. The length in depth is difficult to accurately determine as it is abutting an additional posterior nodule, best estimate measuring 3.1 x 1.4 x 2.1 cm (series 200 image 105 of 200). No prior ultrasound. COMPOSITION: 2 points, solid or almost completely solid. ECHOGENICITY: 1 point, hyperechoic or isoechoic. SHAPE: 0 points, wider-than-tall. MARGIN: 0 points, ill-defined. ECHOGENIC FOCI: 0 points, none or large comet-tail artifacts. TI-RADS Classification: TR 3 (3 points), FNA if >/= 2.5 cm. Follow if >/= 1.5 cm. Nodule #2. Image 36. LEFT posterior midgland nodule measuring 2.3 x 1.5 x 2.2 cm. No prior ultrasound. COMPOSITION: 2 points, solid or almost completely solid. ECHOGENICITY: 1 point, hyperechoic or isoechoic. SHAPE: 0 points, wider-than-tall. MARGIN: 0 points, smooth. ECHOGENIC FOCI: 0 points, none or large comet-tail artifacts. TI-RADS Classification: TR 3 (3 points), FNA if >/= 2.5 cm. Follow if >/= 1.5 cm. There are additional nodules of lesser size and/or TI-RADS score. These do not necessitate additional evaluation  based on ACR criteria.     Impression: Asymmetric left thyroid enlargement with associated nodules corresponding to previous CT findings. The following meet current ACR criteria for recommending ultrasound guided biopsy: 1. LEFT anterior mid to lower pole 3.1 cm nodule (Image 33). 2. LEFT posterior midgland nodule measuring 2.3 cm (Image 36). Reference: ACR Thyroid Imaging, Reporting and Data System (TI-RADS): White Paper of the ACR TI-RADS Committee. J AM Winifred Radiol 2017;14:587-595. Additional recommendations based on American Thyroid Association 2015 guidelines. The study was marked in EPIC for significant notification and follow-up. Workstation performed: [id number]       I have personally reviewed labs, imaging studies, and pertinent reports.      This note has been generated by voice recognition software system.  Therefore, there may be spelling, grammar, and or syntax errors. Please contact if questions arise.

## 2025-06-22 LAB
ALBUMIN SERPL BCG-MCNC: 3.6 G/DL (ref 3.5–5)
ALP SERPL-CCNC: 68 U/L (ref 34–104)
ALT SERPL W P-5'-P-CCNC: 43 U/L (ref 7–52)
ANION GAP SERPL CALCULATED.3IONS-SCNC: 4 MMOL/L (ref 4–13)
AST SERPL W P-5'-P-CCNC: 24 U/L (ref 13–39)
BASOPHILS # BLD AUTO: 0.06 THOUSANDS/ÂΜL (ref 0–0.1)
BASOPHILS NFR BLD AUTO: 1 % (ref 0–1)
BILIRUB SERPL-MCNC: 0.57 MG/DL (ref 0.2–1)
BUN SERPL-MCNC: 14 MG/DL (ref 5–25)
CALCIUM SERPL-MCNC: 9.1 MG/DL (ref 8.4–10.2)
CHLORIDE SERPL-SCNC: 101 MMOL/L (ref 96–108)
CO2 SERPL-SCNC: 33 MMOL/L (ref 21–32)
CREAT SERPL-MCNC: 0.92 MG/DL (ref 0.6–1.3)
EOSINOPHIL # BLD AUTO: 0.61 THOUSAND/ÂΜL (ref 0–0.61)
EOSINOPHIL NFR BLD AUTO: 9 % (ref 0–6)
ERYTHROCYTE [DISTWIDTH] IN BLOOD BY AUTOMATED COUNT: 16.5 % (ref 11.6–15.1)
GFR SERPL CREATININE-BSD FRML MDRD: 86 ML/MIN/1.73SQ M
GLUCOSE SERPL-MCNC: 123 MG/DL (ref 65–140)
GLUCOSE SERPL-MCNC: 132 MG/DL (ref 65–140)
GLUCOSE SERPL-MCNC: 138 MG/DL (ref 65–140)
GLUCOSE SERPL-MCNC: 148 MG/DL (ref 65–140)
GLUCOSE SERPL-MCNC: 168 MG/DL (ref 65–140)
HCT VFR BLD AUTO: 28.3 % (ref 36.5–49.3)
HGB BLD-MCNC: 9.4 G/DL (ref 12–17)
IMM GRANULOCYTES # BLD AUTO: 0.03 THOUSAND/UL (ref 0–0.2)
IMM GRANULOCYTES NFR BLD AUTO: 0 % (ref 0–2)
LYMPHOCYTES # BLD AUTO: 1.85 THOUSANDS/ÂΜL (ref 0.6–4.47)
LYMPHOCYTES NFR BLD AUTO: 27 % (ref 14–44)
MCH RBC QN AUTO: 30.9 PG (ref 26.8–34.3)
MCHC RBC AUTO-ENTMCNC: 33.2 G/DL (ref 31.4–37.4)
MCV RBC AUTO: 93 FL (ref 82–98)
MONOCYTES # BLD AUTO: 0.11 THOUSAND/ÂΜL (ref 0.17–1.22)
MONOCYTES NFR BLD AUTO: 2 % (ref 4–12)
MTX SERPL-SCNC: 0.1 UMOL/L
NEUTROPHILS # BLD AUTO: 4.26 THOUSANDS/ÂΜL (ref 1.85–7.62)
NEUTS SEG NFR BLD AUTO: 61 % (ref 43–75)
NRBC BLD AUTO-RTO: 0 /100 WBCS
PLATELET # BLD AUTO: 236 THOUSANDS/UL (ref 149–390)
PMV BLD AUTO: 10 FL (ref 8.9–12.7)
POTASSIUM SERPL-SCNC: 3.9 MMOL/L (ref 3.5–5.3)
PROT SERPL-MCNC: 5.9 G/DL (ref 6.4–8.4)
RBC # BLD AUTO: 3.04 MILLION/UL (ref 3.88–5.62)
SODIUM SERPL-SCNC: 138 MMOL/L (ref 135–147)
WBC # BLD AUTO: 6.92 THOUSAND/UL (ref 4.31–10.16)

## 2025-06-22 PROCEDURE — 99232 SBSQ HOSP IP/OBS MODERATE 35: CPT | Performed by: INTERNAL MEDICINE

## 2025-06-22 PROCEDURE — 85025 COMPLETE CBC W/AUTO DIFF WBC: CPT | Performed by: STUDENT IN AN ORGANIZED HEALTH CARE EDUCATION/TRAINING PROGRAM

## 2025-06-22 PROCEDURE — 80204 DRUG ASSAY METHOTREXATE: CPT | Performed by: INTERNAL MEDICINE

## 2025-06-22 PROCEDURE — 82948 REAGENT STRIP/BLOOD GLUCOSE: CPT

## 2025-06-22 PROCEDURE — 80053 COMPREHEN METABOLIC PANEL: CPT | Performed by: STUDENT IN AN ORGANIZED HEALTH CARE EDUCATION/TRAINING PROGRAM

## 2025-06-22 RX ADMIN — INSULIN LISPRO 1 UNITS: 100 INJECTION, SOLUTION INTRAVENOUS; SUBCUTANEOUS at 21:01

## 2025-06-22 RX ADMIN — ATORVASTATIN CALCIUM 20 MG: 20 TABLET, FILM COATED ORAL at 09:19

## 2025-06-22 RX ADMIN — SODIUM BICARBONATE 100 ML/HR: 84 INJECTION, SOLUTION INTRAVENOUS at 20:42

## 2025-06-22 RX ADMIN — QUETIAPINE FUMARATE 12.5 MG: 25 TABLET ORAL at 20:40

## 2025-06-22 RX ADMIN — QUETIAPINE FUMARATE 12.5 MG: 25 TABLET ORAL at 13:09

## 2025-06-22 RX ADMIN — DOCUSATE SODIUM 100 MG: 100 CAPSULE, LIQUID FILLED ORAL at 09:19

## 2025-06-22 RX ADMIN — LEUCOVORIN CALCIUM 25 MG: 25 TABLET ORAL at 01:38

## 2025-06-22 RX ADMIN — ALLOPURINOL 300 MG: 300 TABLET ORAL at 09:19

## 2025-06-22 RX ADMIN — LEUCOVORIN CALCIUM 25 MG: 25 TABLET ORAL at 14:21

## 2025-06-22 RX ADMIN — LEUCOVORIN CALCIUM 25 MG: 25 TABLET ORAL at 09:19

## 2025-06-22 RX ADMIN — DOCUSATE SODIUM 100 MG: 100 CAPSULE, LIQUID FILLED ORAL at 17:18

## 2025-06-22 RX ADMIN — SENNOSIDES 17.2 MG: 8.6 TABLET, FILM COATED ORAL at 13:09

## 2025-06-22 RX ADMIN — SODIUM BICARBONATE 100 ML/HR: 84 INJECTION, SOLUTION INTRAVENOUS at 11:05

## 2025-06-22 RX ADMIN — INSULIN GLARGINE 10 UNITS: 100 INJECTION, SOLUTION SUBCUTANEOUS at 09:17

## 2025-06-22 RX ADMIN — ENOXAPARIN SODIUM 40 MG: 40 INJECTION SUBCUTANEOUS at 09:18

## 2025-06-22 RX ADMIN — PANTOPRAZOLE SODIUM 40 MG: 40 TABLET, DELAYED RELEASE ORAL at 05:00

## 2025-06-22 NOTE — PROGRESS NOTES
Progress Note - Hospitalist   Name: Alexis Dennison 64 y.o. male I MRN: [id number]  Unit/Bed#: [facility] 916-01 I Date of Admission: 6/16/2025   Date of Service: 6/22/2025 I Hospital Day: 6    Assessment & Plan  Primary central nervous system (CNS) lymphoma  recently-diagnosed primary CNS (Dx 4/14/2025, double expressor (BCL2+/MYC+), DLBCL NOS, non-germinal center, Ki-67 60-70, BCL6 rearrangement   treatment course of high-dose MTX + rituximab complicated by sepsis due to recurrent E. coli bacteremia, LETY, and transaminitis.    Presents for inpatient chemotherapy with methotrexate and rituximab  Management per hematology/oncology service  Monitoring MTX levels  Type 2 diabetes mellitus with hyperglycemia, without long-term current use of insulin (HCC)  Controlled with last hemoglobin A1c in February 6 0.6%  Hold home oral regimen  Monitor on sliding scale  Consistent carb diet  Hypoglycemia protocol  Continue lantus 10 units    HTN, goal below 130/80  Not on any antihypertensives  Continue to monitor  Mixed hyperlipidemia  Continue statin  UTI (urinary tract infection)  UA and culture are negative for growth  Observe off antibiotics  Dental infection  Has been taking amoxicillin TID since 6/12, completed his antibiotic course    VTE Pharmacologic Prophylaxis: VTE Score: 4 Moderate Risk (Score 3-4) - Pharmacological DVT Prophylaxis Ordered: heparin.    Mobility:   Basic Mobility Inpatient Raw Score: 18  JH-HLM Goal: 6: Walk 10 steps or more  JH-HLM Achieved: 3: Sit at edge of bed  JH-HLM Goal NOT achieved. Continue with multidisciplinary rounding and encourage appropriate mobility to improve upon JH-HLM goals.    Patient Centered Rounds: I performed bedside rounds with nursing staff today.   Discussions with Specialists or Other Care Team Provider: nurse, CM, oncology    Current Length of Stay: 6 day(s)  Current Patient Status: Inpatient   Certification Statement: The patient will continue to require additional inpatient  hospital stay due to IP chemotherapy monitoring MTX level  Discharge Plan: Anticipate discharge in 24 hrs to home.    Code Status: Level 1 - Full Code    Subjective   Denies any complaints today    Objective :  Temp:  [97.9 °F (36.6 °C)-98.3 °F (36.8 °C)] 97.9 °F (36.6 °C)  HR:  [57-80] 57  BP: (123-143)/(69-83) 127/69  Resp:  [16-18] 18  SpO2:  [96 %-100 %] 96 %  O2 Device: None (Room air)    Body mass index is 25.43 kg/m².     Input and Output Summary (last 24 hours):     Intake/Output Summary (Last 24 hours) at 6/22/2025 1414  Last data filed at 6/22/2025 0915  Gross per 24 hour   Intake 2156 ml   Output 2915 ml   Net -759 ml       Physical Exam  Constitutional:       Appearance: Normal appearance.   HENT:      Head: Normocephalic and atraumatic.      Nose: Nose normal.     Eyes:      Extraocular Movements: Extraocular movements intact.       Cardiovascular:      Rate and Rhythm: Normal rate and regular rhythm.   Pulmonary:      Effort: Pulmonary effort is normal.      Breath sounds: No wheezing or rales.     Musculoskeletal:      Right lower leg: No edema.      Left lower leg: No edema.     Neurological:      Mental Status: He is alert and oriented to person, place, and time.     Psychiatric:         Mood and Affect: Mood normal.         Behavior: Behavior normal.           Lines/Drains:  Lines/Drains/Airways       Active Status       Name Placement date Placement time Site Days    PICC Line 06/16/25 Left Brachial 06/16/25  1652  Brachial  5                    Central Line:  Goal for removal: Port accessed. Will de-access as appropriate.               Lab Results: I have reviewed the following results:   Results from last 7 days   Lab Units 06/22/25  0448   WBC Thousand/uL 6.92   HEMOGLOBIN g/dL 9.4*   HEMATOCRIT % 28.3*   PLATELETS Thousands/uL 236   SEGS PCT % 61   LYMPHO PCT % 27   MONO PCT % 2*   EOS PCT % 9*     Results from last 7 days   Lab Units 06/22/25  0448   SODIUM mmol/L 138   POTASSIUM mmol/L 3.9    CHLORIDE mmol/L 101   CO2 mmol/L 33*   BUN mg/dL 14   CREATININE mg/dL 0.92   ANION GAP mmol/L 4   CALCIUM mg/dL 9.1   ALBUMIN g/dL 3.6   TOTAL BILIRUBIN mg/dL 0.57   ALK PHOS U/L 68   ALT U/L 43   AST U/L 24   GLUCOSE RANDOM mg/dL 123         Results from last 7 days   Lab Units 06/22/25  1112 06/22/25  0637 06/21/25  2159 06/21/25  1651 06/21/25  1058 06/21/25  0815 06/20/25  2028 06/20/25  1644 06/20/25  1106 06/20/25  0814 06/19/25  2035 06/19/25  1642   POC GLUCOSE mg/dl 138 132 144* 168* 105 125 137 152* 108 111 161* 157*     Results from last 7 days   Lab Units 06/16/25  1127   HEMOGLOBIN A1C % 5.5           Recent Cultures (last 7 days):   Results from last 7 days   Lab Units 06/16/25  1659   URINE CULTURE  No Growth <1000 cfu/mL       Imaging Results Review: No pertinent imaging studies reviewed.  Other Study Results Review: No additional pertinent studies reviewed.    Last 24 Hours Medication List:     Current Facility-Administered Medications:     acetaminophen (TYLENOL) tablet 650 mg, Q6H PRN    allopurinol (ZYLOPRIM) tablet 300 mg, Daily    alteplase (CATHFLO) injection 2 mg, Q1MIN PRN    atorvastatin (LIPITOR) tablet 20 mg, Daily    docusate sodium (COLACE) capsule 100 mg, BID    enoxaparin (LOVENOX) subcutaneous injection 40 mg, Daily    insulin glargine (LANTUS) subcutaneous injection 10 Units 0.1 mL, QAM    insulin lispro (HumALOG/ADMELOG) 100 units/mL subcutaneous injection 1-6 Units, 4x Daily (AC & HS) **AND** Fingerstick Glucose (POCT), 4x Daily AC and at bedtime    leucovorin (WELLCOVORIN) tablet 25 mg, Q6H    ondansetron (ZOFRAN) injection 4 mg, Q6H PRN    pantoprazole (PROTONIX) EC tablet 40 mg, Early Morning    QUEtiapine (SEROquel) tablet 12.5 mg, BID    senna (SENOKOT) tablet 17.2 mg, Daily With Lunch    sodium bicarbonate 100 mEq in dextrose 5 % 1,000 mL infusion, Continuous, Last Rate: 100 mL/hr (06/22/25 1105)    sodium chloride 0.9 % infusion, Once PRN    sodium chloride 0.9 %  infusion, Once PRN, Last Rate: 20 mL/hr (06/17/25 5072)    Administrative Statements   Today, Patient Was Seen By: Raul Langley MD  I have spent a total time of 40 minutes in caring for this patient on the day of the visit/encounter including Diagnostic results, Instructions for management, Patient and family education, Impressions, Counseling / Coordination of care, Documenting in the medical record, Reviewing/placing orders in the medical record (including tests, medications, and/or procedures), Obtaining or reviewing history  , and Communicating with other healthcare professionals .    **Please Note: This note may have been constructed using a voice recognition system.**

## 2025-06-22 NOTE — PROGRESS NOTES
Progress Note - Oncology-Medical   Name: Alexis Dennison 64 y.o. male I MRN: [id number]  Unit/Bed#: [facility] 916-01 I Date of Admission: 6/16/2025   Date of Service: 6/22/2025 I Hospital Day: 6     Assessment & Plan  Primary central nervous system (CNS) lymphoma   DLBCL NOS, non-germinal center, Ki-67 60-70, BCL6 rearrangement)   Diagnosed in 4/ 2025. Established with Dr. Phan      On Q-14 days High-dose IV Methotrexate   C1 (4/18) 8 g/m²  C2 (5/2) 3 g/m², dose-reduced due to LETY, delayed x1 day due to E. coli bacteremia   C3 (5/21) 3 g/m²   C4 : 3 g/m² ; given on 6/17/25     S/p Rituximab x 5 doses 4/17/25 - 5/23/25 ; received rituximab 6/19/25 ; and tolerated it well      Patient had prescription for temozolomide total to 90 mg a day on days 7 through 11 (6/23 - 6/27) ; patient and his wife made aware and they confirmed having the medication available at home     PLAN   - Methotrexate level from yesterday, down to 0.1.   -Continue to monitor methotrexate daily until levels trend less than 0.05.  Patient can be discharged home on Monday once his serum methotrexate level is less than 0.05.  - Urine alkalization per protocol ; IV Na Bicarb, Q6H UA , keep PH > 7      24 Hour Events : No events  Subjective : The patient denied any significant symptoms.    Objective :  Temp:  [98 °F (36.7 °C)-98.6 °F (37 °C)] 98.1 °F (36.7 °C)  HR:  [65-80] 71  BP: (117-143)/(63-83) 141/71  Resp:  [16-18] 18  SpO2:  [96 %-100 %] 97 %  O2 Device: None (Room air)    Physical Exam  Vitals and nursing note reviewed.   Constitutional:       General: He is not in acute distress.     Appearance: He is well-developed.   HENT:      Head: Normocephalic and atraumatic.     Eyes:      Conjunctiva/sclera: Conjunctivae normal.       Cardiovascular:      Rate and Rhythm: Normal rate and regular rhythm.      Heart sounds: No murmur heard.  Pulmonary:      Effort: Pulmonary effort is normal. No respiratory distress.      Breath sounds: Normal breath  sounds.   Abdominal:      Palpations: Abdomen is soft.      Tenderness: There is no abdominal tenderness.     Musculoskeletal:         General: No swelling.      Cervical back: Neck supple.     Skin:     General: Skin is warm and dry.      Capillary Refill: Capillary refill takes less than 2 seconds.     Neurological:      Mental Status: He is alert.     Psychiatric:         Mood and Affect: Mood normal.         Lab Results: I have reviewed the following results:CBC/BMP:   .     06/22/25  0448   WBC 6.92   HGB 9.4*   HCT 28.3*      SODIUM 138   K 3.9      CO2 33*   BUN 14   CREATININE 0.92   GLUC 123    , Creatinine Clearance: Estimated Creatinine Clearance: 82.7 mL/min (by C-G formula based on SCr of 0.92 mg/dL)., LFTs:   .     06/22/25  0448   AST 24   ALT 43   ALB 3.6   TBILI 0.57   ALKPHOS 68              VTE Pharmacologic Prophylaxis: Sequential compression device (Venodyne)   VTE Mechanical Prophylaxis: sequential compression device

## 2025-06-22 NOTE — ASSESSMENT & PLAN NOTE
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 Established with Dr. Phan      On Q-14 days High-dose IV Methotrexate   C1 (4/18) 8 g/m²  C2 (5/2) 3 g/m², dose-reduced due to LETY, delayed x1 day due to E. coli bacteremia   C3 (5/21) 3 g/m²   C4 : 3 g/m² ; given on 6/17/25     S/p Rituximab x 5 doses 4/17/25 - 5/23/25 ; received rituximab 6/19/25 ; and tolerated it well      Patient had prescription for temozolomide total to 90 mg a day on days 7 through 11 (6/23 - 6/27) ; patient and his wife made aware and they confirmed having the medication available at home     PLAN   - Methotrexate level from yesterday, down to 0.1.   -Continue to monitor methotrexate daily until levels trend less than 0.05.  Patient can be discharged home on Monday once his serum methotrexate level is less than 0.05.  - Urine alkalization per protocol ; IV Na Bicarb, Q6H UA , keep PH > 7

## 2025-06-22 NOTE — PLAN OF CARE
Problem: PAIN - ADULT  Goal: Verbalizes/displays adequate comfort level or baseline comfort level  Description: Interventions:  - Encourage patient to monitor pain and request assistance  - Assess pain using appropriate pain scale  - Administer analgesics as ordered based on type and severity of pain and evaluate response  - Implement non-pharmacological measures as appropriate and evaluate response  - Consider cultural and social influences on pain and pain management  - Notify physician/advanced practitioner if interventions unsuccessful or patient reports new pain  - Educate patient/family on pain management process including their role and importance of  reporting pain   - Provide non-pharmacologic/complimentary pain relief interventions  Outcome: Progressing     Problem: INFECTION - ADULT  Goal: Absence or prevention of progression during hospitalization  Description: INTERVENTIONS:  - Assess and monitor for signs and symptoms of infection  - Monitor lab/diagnostic results  - Monitor all insertion sites, i.e. indwelling lines, tubes, and drains  - Monitor endotracheal if appropriate and nasal secretions for changes in amount and color  - [place] appropriate cooling/warming therapies per order  - Administer medications as ordered  - Instruct and encourage patient and family to use good hand hygiene technique  - Identify and instruct in appropriate isolation precautions for identified infection/condition  Outcome: Progressing  Goal: Absence of fever/infection during neutropenic period  Description: INTERVENTIONS:  - Monitor WBC  - Perform strict hand hygiene  - Limit to healthy visitors only  - No plants, dried, fresh or silk flowers with carrion in patient room  Outcome: Progressing     Problem: SAFETY ADULT  Goal: Patient will remain free of falls  Description: INTERVENTIONS:  - Educate patient/family on patient safety including physical limitations  - Instruct patient to call for assistance with activity   -  Consider consulting OT/PT to assist with strengthening/mobility based on AM PAC & JH-HLM score  - Consult OT/PT to assist with strengthening/mobility   - Keep Call bell within reach  - Keep bed low and locked with side rails adjusted as appropriate  - Keep care items and personal belongings within reach  - Initiate and maintain comfort rounds  - Make Fall Risk Sign visible to staff  - Offer Toileting every  Hours, in advance of need  - Initiate/Maintain alarm  - Obtain necessary fall risk management equipment:   - Apply yellow socks and bracelet for high fall risk patients  - Consider moving patient to room near nurses station  Outcome: Progressing  Goal: Maintain or return to baseline ADL function  Description: INTERVENTIONS:  -  Assess patient's ability to carry out ADLs; assess patient's baseline for ADL function and identify physical deficits which impact ability to perform ADLs (bathing, care of mouth/teeth, toileting, grooming, dressing, etc.)  - Assess/evaluate cause of self-care deficits   - Assess range of motion  - Assess patient's mobility; develop plan if impaired  - Assess patient's need for assistive devices and provide as appropriate  - Encourage maximum independence but intervene and supervise when necessary  - Involve family in performance of ADLs  - Assess for home care needs following discharge   - Consider OT consult to assist with ADL evaluation and planning for discharge  - Provide patient education as appropriate  - Monitor functional capacity and physical performance, use of AM PAC & JH-HLM   - Monitor gait, balance and fatigue with ambulation    Outcome: Progressing  Goal: Maintains/Returns to pre admission functional level  Description: INTERVENTIONS:  - Perform AM-PAC 6 Click Basic Mobility/ Daily Activity assessment daily.  - Set and communicate daily mobility goal to care team and patient/family/caregiver.   - Collaborate with rehabilitation services on mobility goals if consulted  -  Perform Range of Motion  times a day.  - Reposition patient every  hours.  - Dangle patient  times a day  - Stand patient  times a day  - Ambulate patient  times a day  - Out of bed to chair  times a day   - Out of bed for meals  times a day  - Out of bed for toileting  - Record patient progress and toleration of activity level   Outcome: Progressing     Problem: DISCHARGE PLANNING  Goal: Discharge to home or other facility with appropriate resources  Description: INTERVENTIONS:  - Identify barriers to discharge w/patient and caregiver  - Arrange for needed discharge resources and transportation as appropriate  - Identify discharge learning needs (meds, wound care, etc.)  - Arrange for interpretive services to assist at discharge as needed  - Refer to Case Management Department for coordinating discharge planning if the patient needs post-hospital services based on physician/advanced practitioner order or complex needs related to functional status, cognitive ability, or social support system  Outcome: Progressing     Problem: Knowledge Deficit  Goal: Patient/family/caregiver demonstrates understanding of disease process, treatment plan, medications, and discharge instructions  Description: Complete learning assessment and assess knowledge base.  Interventions:  - Provide teaching at level of understanding  - Provide teaching via preferred learning methods  Outcome: Progressing     Problem: Prexisting or High Potential for Compromised Skin Integrity  Goal: Skin integrity is maintained or improved  Description: INTERVENTIONS:  - Identify patients at risk for skin breakdown  - Assess and monitor skin integrity including under and around medical devices   - Assess and monitor nutrition and hydration status  - Monitor labs  - Assess for incontinence   - Turn and reposition patient  - Assist with mobility/ambulation  - Relieve pressure over [place] prominences   - Avoid friction and shearing  - Provide appropriate hygiene  as needed including keeping skin clean and dry  - Evaluate need for skin moisturizer/barrier cream  - Collaborate with interdisciplinary team  - Patient/family teaching  - Consider wound care consult    Assess:  - Review [place] scale daily  - Clean and moisturize skin every   - Inspect skin when repositioning, toileting, and assisting with ADLS  - Assess under medical devices such as  every   - Assess extremities for adequate circulation and sensation     Bed Management:  - Have minimal linens on bed & keep smooth, unwrinkled  - Change linens as needed when moist or perspiring  - Avoid sitting or lying in one position for more than  hours while in bed?Keep HOB at degrees   - Toileting:  - Offer bedside commode  - Assess for incontinence every   - Use incontinent care products after each incontinent episode such as    Activity:  - Mobilize patient  times a day  - Encourage activity and walks on unit  - Encourage or provide ROM exercises   - Turn and reposition patient every  Hours  - Use appropriate equipment to lift or move patient in bed  - Instruct/ Assist with weight shifting every  when out of bed in chair  - Consider limitation of chair time  hour intervals    Skin Care:  - Avoid use of baby powder, tape, friction and shearing, hot water or constrictive clothing  - Relieve pressure over bony prominences using   - Do not massage red bony areas    Next Steps:  - Teach patient strategies to minimize risks such as   - Consider consults to  interdisciplinary teams such as   Outcome: Progressing

## 2025-06-23 VITALS
DIASTOLIC BLOOD PRESSURE: 83 MMHG | HEART RATE: 91 BPM | BODY MASS INDEX: 25.38 KG/M2 | OXYGEN SATURATION: 98 % | SYSTOLIC BLOOD PRESSURE: 127 MMHG | WEIGHT: 177.25 LBS | HEIGHT: 70 IN | RESPIRATION RATE: 17 BRPM | TEMPERATURE: 98.2 F

## 2025-06-23 LAB
GLUCOSE SERPL-MCNC: 112 MG/DL (ref 65–140)
GLUCOSE SERPL-MCNC: 173 MG/DL (ref 65–140)
MTX SERPL-SCNC: <0.1 UMOL/L

## 2025-06-23 PROCEDURE — 99232 SBSQ HOSP IP/OBS MODERATE 35: CPT | Performed by: INTERNAL MEDICINE

## 2025-06-23 PROCEDURE — 99239 HOSP IP/OBS DSCHRG MGMT >30: CPT | Performed by: INTERNAL MEDICINE

## 2025-06-23 PROCEDURE — 82948 REAGENT STRIP/BLOOD GLUCOSE: CPT

## 2025-06-23 RX ORDER — SODIUM BICARBONATE 650 MG/1
650 TABLET ORAL EVERY 6 HOURS
Qty: 4 TABLET | Refills: 0 | Status: SHIPPED | OUTPATIENT
Start: 2025-06-23 | End: 2025-06-23

## 2025-06-23 RX ORDER — LEUCOVORIN CALCIUM 25 MG/1
25 TABLET ORAL EVERY 6 HOURS
Qty: 4 TABLET | Refills: 0 | Status: SHIPPED | OUTPATIENT
Start: 2025-06-23 | End: 2025-06-24

## 2025-06-23 RX ORDER — LEUCOVORIN CALCIUM 25 MG/1
25 TABLET ORAL EVERY 6 HOURS
Qty: 4 TABLET | Refills: 0 | Status: SHIPPED | OUTPATIENT
Start: 2025-06-23 | End: 2025-06-23

## 2025-06-23 RX ORDER — SODIUM BICARBONATE 650 MG/1
650 TABLET ORAL EVERY 6 HOURS
Qty: 4 TABLET | Refills: 0 | Status: SHIPPED | OUTPATIENT
Start: 2025-06-23 | End: 2025-06-24 | Stop reason: SDUPTHER

## 2025-06-23 RX ADMIN — PANTOPRAZOLE SODIUM 40 MG: 40 TABLET, DELAYED RELEASE ORAL at 05:11

## 2025-06-23 RX ADMIN — INSULIN GLARGINE 10 UNITS: 100 INJECTION, SOLUTION SUBCUTANEOUS at 08:46

## 2025-06-23 RX ADMIN — ATORVASTATIN CALCIUM 20 MG: 20 TABLET, FILM COATED ORAL at 08:46

## 2025-06-23 RX ADMIN — ALLOPURINOL 300 MG: 300 TABLET ORAL at 08:46

## 2025-06-23 RX ADMIN — SENNOSIDES 17.2 MG: 8.6 TABLET, FILM COATED ORAL at 12:24

## 2025-06-23 RX ADMIN — QUETIAPINE FUMARATE 12.5 MG: 25 TABLET ORAL at 12:24

## 2025-06-23 RX ADMIN — ENOXAPARIN SODIUM 40 MG: 40 INJECTION SUBCUTANEOUS at 08:46

## 2025-06-23 RX ADMIN — SODIUM BICARBONATE 100 ML/HR: 84 INJECTION, SOLUTION INTRAVENOUS at 05:14

## 2025-06-23 RX ADMIN — INSULIN LISPRO 1 UNITS: 100 INJECTION, SOLUTION INTRAVENOUS; SUBCUTANEOUS at 12:24

## 2025-06-23 RX ADMIN — DOCUSATE SODIUM 100 MG: 100 CAPSULE, LIQUID FILLED ORAL at 08:46

## 2025-06-23 NOTE — DISCHARGE SUMMARY
Discharge Summary - Hospitalist   Name: Alexis Dennison 64 y.o. male I MRN: [id number]  [facility] I Date of Admission: 6/16/2025   Date of Service: 6/23/2025 I Hospital Day: 7     Assessment & Plan  Primary central nervous system (CNS) lymphoma  recently-diagnosed primary CNS (Dx 4/14/2025, double expressor (BCL2+/MYC+), DLBCL NOS, non-germinal center, Ki-67 60-70, BCL6 rearrangement   treatment course of high-dose MTX + rituximab complicated by sepsis due to recurrent E. coli bacteremia, LETY, and transaminitis.    Presents for inpatient chemotherapy with methotrexate and rituximab  Management per hematology/oncology service  Discharge home today, outpatient follow up with oncology  Type 2 diabetes mellitus with hyperglycemia, without long-term current use of insulin (HCC)  Controlled with last hemoglobin A1c in February 6 0.6%  Hold home oral regimen  Monitor on sliding scale  Consistent carb diet  Hypoglycemia protocol  Resume home regimen on discharge    HTN, goal below 130/80  Not on any antihypertensives  Continue to monitor  Mixed hyperlipidemia  Continue statin  UTI (urinary tract infection)  UA and culture are negative for growth  Observe off antibiotics  Dental infection  Has been taking amoxicillin TID since 6/12, completed his antibiotic course     Medical Problems       Resolved Problems  Date Reviewed: 4/14/2025   None       Discharging Physician / Practitioner: Raul Langley MD  PCP: PATI Mckeon  Admission Date:   Admission Orders (From admission, onward)       Ordered        06/16/25 0917  Inpatient Admission  Once                          Discharge Date: 06/23/25    Next Steps for Physician/AP Assuming Care:  Follow up with oncology    Test Results Pending at Discharge (will require follow up):  None    Medication Changes for Discharge & Rationale:   Leukovorin and sodium bicarb started for clearance of chemotherapy  See after visit summary for reconciled discharge medications  "provided to patient and/or family.     Consultations During Hospital Stay:  oncology    Procedures Performed:   IP chemotherapy    Significant Findings / Test Results:   None    Incidental Findings:   None       Hospital Course:   Alexis Dennison is a 64 y.o. male patient who originally presented to the hospital on 6/16/2025 due to inpatient chemotherapy. He received his chemotherapy per protocol and his hospitalization was without any significant complications. He was discharged back home.      Please see above list of diagnoses and related plan for additional information.     Discharge Day Visit / Exam:   Subjective:  denies any complaints today  Vitals: Blood Pressure: 127/83 (06/23/25 1132)  Pulse: 91 (06/23/25 1132)  Temperature: 98.2 °F (36.8 °C) (06/23/25 1132)  Temp Source: Oral (06/23/25 0237)  Respirations: 17 (06/23/25 1132)  Height: 5' 10\" (177.8 cm) (06/19/25 0800)  Weight - Scale: 80.4 kg (177 lb 4 oz) (06/19/25 0800)  SpO2: 98 % (06/23/25 1132)  Physical Exam  Constitutional:       Appearance: Normal appearance.   HENT:      Head: Normocephalic and atraumatic.      Nose: Nose normal.     Eyes:      Extraocular Movements: Extraocular movements intact.       Cardiovascular:      Rate and Rhythm: Normal rate and regular rhythm.   Pulmonary:      Effort: Pulmonary effort is normal.   Abdominal:      Palpations: Abdomen is soft.     Musculoskeletal:      Right lower leg: No edema.      Left lower leg: No edema.     Neurological:      Mental Status: He is alert and oriented to person, place, and time.     Psychiatric:         Mood and Affect: Mood normal.         Behavior: Behavior normal.          Discussion with Family: Updated  (wife) at bedside.    Discharge instructions/Information to patient and family:   See after visit summary for information provided to patient and family.      Provisions for Follow-Up Care:  See after visit summary for information related to follow-up care and any " pertinent home health orders.      Mobility at time of Discharge:   Basic Mobility Inpatient Raw Score: 18  JH-HLM Goal: 6: Walk 10 steps or more  JH-HLM Achieved: 8: Walk 250 feet ot more  HLM Goal achieved. Continue to encourage appropriate mobility.     Disposition:   Home    Planned Readmission: No    Administrative Statements   Discharge Statement:  I have spent a total time of 45 minutes in caring for this patient on the day of the visit/encounter. >30 minutes of time was spent on: Diagnostic results, Instructions for management, Patient and family education, Impressions, Counseling / Coordination of care, Documenting in the medical record, Reviewing / ordering tests, medicine, procedures  , and Communicating with other healthcare professionals .    **Please Note: This note may have been constructed using a voice recognition system**

## 2025-06-23 NOTE — PROGRESS NOTES
Progress Note - Oncology-Medical   Name: Alexis Dennison 64 y.o. 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 Established with Dr. Phan      On Q-14 days High-dose IV Methotrexate   C1 (4/18) 8 g/m²  C2 (5/2) 3 g/m², dose-reduced due to LETY, delayed x1 day due to E. coli bacteremia   C3 (5/21) 3 g/m²   C4 : 3 g/m² ; given on 6/17/25     S/p Rituximab x 5 doses 4/17/25 - 5/23/25 ; received rituximab 6/19/25 ; and tolerated it well      Patient had prescription for temozolomide total to 90 mg a day on days 7 through 11 (6/23 - 6/27) ; patient and his wife made aware and they confirmed having the medication available at home     PLAN   - Methotrexate level from 6/21, down to 0.1.   -Continue to monitor methotrexate daily until levels trend less than 0.05.  Patient can be discharged home on Monday once his serum methotrexate level is less than 0.05.  - Urine alkalization per protocol ; IV Na Bicarb, Q6H UA , keep PH > 7      Outpatient follow up plan: Patient has outpatient appointment scheduled with Dr. Her catalan on 7/18/2025.    Communication with patient/family:  I did update the patient.    Communication with team:  Did communicate with primary team.    I did review this patient with Dr. Lee and they are in agreement with this plan.          Subjective:  Patient seen at bedside, no active complaints to offer.  We discussed lab results and need for close monitoring in the hospital.  He is agreeable with the plan.    - GENERAL: Negative for any nausea, vomiting, fevers, chills, or weight loss.  - HEENT: Negative for any head/Neck trauma, pain, double/blurry vision, sinusitis, rhinitis, nose bleeding.  - CARDIAC: Negative for any chest pain, palpitation, Dyspnea on exertion,  peripheral edema.  - PULMONARY: Negative for any SOB, cough, wheezing.   - GASTROINTESTINAL: Negative for any abdominal pain, N/V/D/C, blood in stool.   - GENITOURINARY: Negative for any dysuria, hematuria, incontinence.  - NEUROLOGIC: Negative for any muscle weakness, numbness/tingling, memory changes.    - MUSCULOSKELETAL: Negative for any joint pains/swelling, limited ROM.   - INTEGUMENTARY: Negative for any rashes, cuts/ lesions.  - HEMATOLOGIC: Negative for any abnormal bruising, frequent infections or bleeding.         Objective:     Medication Administration - last 24 hours from 06/22/2025 1011 to 06/23/2025 1011         Date/Time Order Dose Route Action Action by     06/23/2025 0846 EDT enoxaparin (LOVENOX) subcutaneous injection 40 mg 40 mg Subcutaneous Given Hawk Call RN     06/23/2025 0846 EDT allopurinol (ZYLOPRIM) tablet 300 mg 300 mg Oral Given Hawk Call RN     06/23/2025 0846 EDT atorvastatin (LIPITOR) tablet 20 mg 20 mg Oral Given Hawk Call RN     06/23/2025 0846 EDT docusate sodium (COLACE) capsule 100 mg 100 mg Oral Given Hawk Call RN     06/22/2025 1718 EDT docusate sodium (COLACE) capsule 100 mg 100 mg Oral Given Jono Higginbotham RN     06/23/2025 0511 EDT pantoprazole (PROTONIX) EC tablet 40 mg 40 mg Oral Given Maribell Infante RN     06/22/2025 2040 EDT QUEtiapine (SEROquel) tablet 12.5 mg 12.5 mg Oral Given Maribell Infante RN     06/22/2025 1309 EDT QUEtiapine (SEROquel) tablet 12.5 mg 12.5 mg Oral Given Jono Higginbotham RN     06/22/2025 1309 EDT senna (SENOKOT) tablet 17.2 mg 17.2 mg Oral Given Jono Higginbotham RN     06/23/2025 0514 EDT sodium bicarbonate 100 mEq in dextrose 5 % 1,000 mL infusion 100 mL/hr Intravenous New Bag Maribell Infante RN     06/22/2025 2042 EDT sodium bicarbonate 100 mEq in dextrose 5 % 1,000 mL infusion 100 mL/hr Intravenous New Bag Maribell Infante RN     06/22/2025 1105 EDT sodium bicarbonate 100 mEq in dextrose 5 %  "1,000 mL infusion 100 mL/hr Intravenous New Bag Jono Higginbotham RN     06/23/2025 0746 EDT insulin lispro (HumALOG/ADMELOG) 100 units/mL subcutaneous injection 1-6 Units -- Subcutaneous Not Given Rupali Colmenares RN     06/22/2025 2101 EDT insulin lispro (HumALOG/ADMELOG) 100 units/mL subcutaneous injection 1-6 Units 1 Units Subcutaneous Given Maribell Infante RN     06/22/2025 1629 EDT insulin lispro (HumALOG/ADMELOG) 100 units/mL subcutaneous injection 1-6 Units -- Subcutaneous Not Given Jono Higginbotham RN     06/22/2025 1311 EDT insulin lispro (HumALOG/ADMELOG) 100 units/mL subcutaneous injection 1-6 Units -- Subcutaneous Not Given Jono Higginbotham RN     06/23/2025 0846 EDT insulin glargine (LANTUS) subcutaneous injection 10 Units 0.1 mL 10 Units Subcutaneous Given Hawk Call RN     06/22/2025 1421 EDT leucovorin (WELLCOVORIN) tablet 25 mg 25 mg Oral Given Jono Higginbotham RN            /78 (BP Location: Right arm)   Pulse 70   Temp 98.1 °F (36.7 °C) (Oral)   Resp 16   Ht 5' 10\" (1.778 m)   Wt 80.4 kg (177 lb 4 oz)   SpO2 98%   BMI 25.43 kg/m²       - GEN: flat affect and slow speech otherwise Appears well, alert and oriented x 3, pleasant and cooperative, in no acute distress  - HEENT: Anicteric, mucous membranes moist, PERRL and EOMI   - NECK: No lymphadenopathy, JVD or carotid bruits   - HEART: RRR, normal S1 and S2, no murmurs, clicks, gallops or rubs   - LUNGS: Clear to auscultation bilaterally; no wheezes, rales, or rhonchi  - ABDOMEN: Normal bowel sounds, soft, no tenderness, no distention, no organomegaly or masses felt on exam.   - EXTREMITIES: Peripheral pulses normal; no clubbing, cyanosis, or edema  - NEURO: No focal findings, CN II-XII are grossly intact.   - Musculoskeletal: 5/5 strength, normal ROM, no swollen or erythematous joints.   - SKIN: Normal without suspicious lesions on exposed skin          Recent Results (from the past 48 hours)   Urinalysis " with microscopic    Collection Time: 06/21/25 10:32 AM   Result Value Ref Range    Color, UA Colorless     Clarity, UA Clear     Specific Gravity, UA 1.007 1.003 - 1.030    pH, UA 8.0 4.5, 5.0, 5.5, 6.0, 6.5, 7.0, 7.5, 8.0    Leukocytes, UA Negative Negative    Nitrite, UA Negative Negative    Protein, UA Negative Negative mg/dl    Glucose, UA Negative Negative mg/dl    Ketones, UA Negative Negative mg/dl    Urobilinogen, UA <2.0 <2.0 mg/dl mg/dl    Bilirubin, UA Negative Negative    Occult Blood, UA Negative Negative    RBC, UA None Seen None Seen, 1-2 /hpf    WBC, UA None Seen None Seen, 1-2 /hpf    Epithelial Cells None Seen None Seen, Occasional /hpf    Bacteria, UA None Seen None Seen, Occasional /hpf   Fingerstick Glucose (POCT)    Collection Time: 06/21/25 10:58 AM   Result Value Ref Range    POC Glucose 105 65 - 140 mg/dl   Methotrexate level    Collection Time: 06/21/25  2:10 PM   Result Value Ref Range    Methotrexate Lvl 0.10 umol/L   Fingerstick Glucose (POCT)    Collection Time: 06/21/25  4:51 PM   Result Value Ref Range    POC Glucose 168 (H) 65 - 140 mg/dl   Fingerstick Glucose (POCT)    Collection Time: 06/21/25  9:59 PM   Result Value Ref Range    POC Glucose 144 (H) 65 - 140 mg/dl   CBC and differential    Collection Time: 06/22/25  4:48 AM   Result Value Ref Range    WBC 6.92 4.31 - 10.16 Thousand/uL    RBC 3.04 (L) 3.88 - 5.62 Million/uL    Hemoglobin 9.4 (L) 12.0 - 17.0 g/dL    Hematocrit 28.3 (L) 36.5 - 49.3 %    MCV 93 82 - 98 fL    MCH 30.9 26.8 - 34.3 pg    MCHC 33.2 31.4 - 37.4 g/dL    RDW 16.5 (H) 11.6 - 15.1 %    MPV 10.0 8.9 - 12.7 fL    Platelets 236 149 - 390 Thousands/uL    nRBC 0 /100 WBCs    Segmented % 61 43 - 75 %    Immature Grans % 0 0 - 2 %    Lymphocytes % 27 14 - 44 %    Monocytes % 2 (L) 4 - 12 %    Eosinophils Relative 9 (H) 0 - 6 %    Basophils Relative 1 0 - 1 %    Absolute Neutrophils 4.26 1.85 - 7.62 Thousands/µL    Absolute Immature Grans 0.03 0.00 - 0.20 Thousand/uL     Absolute Lymphocytes 1.85 0.60 - 4.47 Thousands/µL    Absolute Monocytes 0.11 (L) 0.17 - 1.22 Thousand/µL    Eosinophils Absolute 0.61 0.00 - 0.61 Thousand/µL    Basophils Absolute 0.06 0.00 - 0.10 Thousands/µL   Comprehensive metabolic panel    Collection Time: 06/22/25  4:48 AM   Result Value Ref Range    Sodium 138 135 - 147 mmol/L    Potassium 3.9 3.5 - 5.3 mmol/L    Chloride 101 96 - 108 mmol/L    CO2 33 (H) 21 - 32 mmol/L    ANION GAP 4 4 - 13 mmol/L    BUN 14 5 - 25 mg/dL    Creatinine 0.92 0.60 - 1.30 mg/dL    Glucose 123 65 - 140 mg/dL    Calcium 9.1 8.4 - 10.2 mg/dL    AST 24 13 - 39 U/L    ALT 43 7 - 52 U/L    Alkaline Phosphatase 68 34 - 104 U/L    Total Protein 5.9 (L) 6.4 - 8.4 g/dL    Albumin 3.6 3.5 - 5.0 g/dL    Total Bilirubin 0.57 0.20 - 1.00 mg/dL    eGFR 86 ml/min/1.73sq m   Fingerstick Glucose (POCT)    Collection Time: 06/22/25  6:37 AM   Result Value Ref Range    POC Glucose 132 65 - 140 mg/dl   Fingerstick Glucose (POCT)    Collection Time: 06/22/25 11:12 AM   Result Value Ref Range    POC Glucose 138 65 - 140 mg/dl   Fingerstick Glucose (POCT)    Collection Time: 06/22/25  4:28 PM   Result Value Ref Range    POC Glucose 148 (H) 65 - 140 mg/dl   Fingerstick Glucose (POCT)    Collection Time: 06/22/25  9:00 PM   Result Value Ref Range    POC Glucose 168 (H) 65 - 140 mg/dl   Fingerstick Glucose (POCT)    Collection Time: 06/23/25  7:28 AM   Result Value Ref Range    POC Glucose 112 65 - 140 mg/dl       NM PET CT skull base to mid thigh  Result Date: 6/16/2025  Narrative: PET/CT SCAN INDICATION: C83.390: Primary central nervous system lymphoma, restaging MODIFIER: PS COMPARISON: CT of abdomen and pelvis dated 5/18/2025, MRI of the brain, cervical spine, thoracic spine, and lumbar spine dated 4/19/25, CT angiography of the chest, abdomen, and pelvis dated 3/29/2025 CELL TYPE: Involved by pt's large B-cell lymphoma (bx 4/13/25 CSF +) TECHNIQUE:   8.2 mCi F-18-FDG administered IV. Multiplanar  attenuation corrected and non attenuation corrected PET images are available for interpretation, and contiguous, low dose, axial CT sections were obtained from the skull vertex through the femurs. Intravenous contrast material was not utilized. This examination, like all CT scans performed in the [facility], was performed utilizing techniques to minimize radiation dose exposure, including the use of iterative reconstruction and automated exposure control. Fasting serum glucose: 115 mg/dl FINDINGS: VISUALIZED BRAIN: On PET image 37 there is subtle asymmetric focal activity involving the left cerebral hemisphere slightly posterior to the left frontal horn which is difficult to correlate on low-dose unenhanced CT but measures approximately 1.5 cm on PET imaging with max SUV of 7.7; this finding is in the expected location patient's known enhancing intracranial mass which is better characterized on MRI of the brain dated 4/20/25 and could reflect mild hypermetabolic malignant activity versus asymmetric activity involving the left basal ganglia which is in this region. Patient's known intracranial malignancy is better characterized on MRI of the brain given background physiologic brain activity and continued follow-up with MRI of the brain is recommended. HEAD/NECK: On image 20 there is mild FDG activity associated with cutaneous/subcutaneous soft tissue density involving the left frontal scalp which appears superficial to a siomara hole in the left frontal calvarium extending from images 21 through 24 of series 3. On image 20 of series 3 this finding measures approximately 1.3 cm in length with max SUV of 3.9 and possibly reflects inflammatory activity although correlation is recommended to exclude the possibility of cutaneous/subcutaneous malignancy of low metabolic  activity. CT images: Essentially stable prominence to the lateral ventricles which is better characterized on prior dedicated MRI of the  brain. Stable asymmetric nodular enlargement of the left thyroid gland which was better characterized on thyroid ultrasound dated 6//25 with recommendation for tissue sampling was made. On image 93 of series 3 there is a nonspecific mildly prominent common nonpathologic in size left perithyroidal/lower cervical lymph node measuring 6 mm in short axis. CHEST: No FDG avid soft tissue lesions are seen. CT images: Trace gynecomastia. Atherosclerotic vascular calcifications including those of the coronary arteries are noted. Tiny calcified right middle lobe granuloma. On image 129 of series 3 there is a new 6 mm left lower lobe lung nodule, possibly related to adjacent subtle patchy likely inflammatory groundglass airspace disease although given known malignancy, short interval follow-up with CT of chest in 3 months is recommended to ensure stability. ABDOMEN: Fairly diffuse nonspecific nonuniform bowel activity, possibly physiologic which limits evaluation for underlying hypermetabolic bowel malignancy. CT images: Stable right hepatic hypodensity which was better characterized on prior MRI of abdomen where it was characterized as a cyst. Atherosclerotic vascular calcifications are noted. PELVIS: No FDG avid soft tissue lesions are seen. CT images: Enlarged prostate gland. Mild nonspecific diffuse mural thickening of the urinary bladder wall which is difficult to evaluate secondary to under distention. OSSEOUS STRUCTURES: No FDG avid lesions are seen. CT images: No significant findings.     Impression: 1.  Subtle asymmetric focal FDG activity involving the left cerebral hemisphere in the expected location of patient's known enhancing intracranial mass which could reflect mild hypermetabolic malignancy versus asymmetric physiologic activity involving the left basal ganglia. Patient's known intracranial malignancy is better characterized on MRI of the brain and continued follow-up with MRI of the brain is recommended as  clinically indicated. 2.  Mild nonspecific FDG activity associated with cutaneous/subcutaneous soft tissue density involving the left frontal scalp which appears superficial to a siomara hole in the left frontal calvarium, possibly reflecting inflammatory activity. Correlation with direct visualization is recommended to exclude the possibility of cutaneous/subcutaneous malignancy of low metabolic activity. 3.  New 6 mm left lower lobe lung nodule, possibly inflammatory. Short interval follow-up with CT of chest in 3 months is recommended to ensure stability. 4.  Please see above for details and additional findings. The study was marked in EPIC for significant notification. The study was marked in Epic for follow-up. Workstation performed: [id number]     US thyroid  Result Date: 6/11/2025  Narrative: THYROID ULTRASOUND INDICATION: E04.1: Nontoxic single thyroid nodule. COMPARISON: CTA head and neck 3/24/2025 TECHNIQUE: Ultrasound of the thyroid was performed with a high frequency linear transducer in transverse and sagittal planes including volumetric imaging sweeps as well as traditional still imaging technique. FINDINGS: Thyroid texture: Thyroid is mildly inhomogeneous and hypervascular. Right lobe: 5.0 x 1.5 x 1.9 cm. Volume 6.9 mL Left lobe: 6.0 x 3.1 x 2.8 cm. Volume 24.9 mL Isthmus: 0.3 cm. Nodule #1. Image 33. LEFT anterior mid to lower pole nodule. The length in depth is difficult to accurately determine as it is abutting an additional posterior nodule, best estimate measuring 3.1 x 1.4 x 2.1 cm (series 200 image 105 of 200). No prior ultrasound. COMPOSITION: 2 points, solid or almost completely solid. ECHOGENICITY: 1 point, hyperechoic or isoechoic. SHAPE: 0 points, wider-than-tall. MARGIN: 0 points, ill-defined. ECHOGENIC FOCI: 0 points, none or large comet-tail artifacts. TI-RADS Classification: TR 3 (3 points), FNA if >/= 2.5 cm. Follow if >/= 1.5 cm. Nodule #2. Image 36. LEFT posterior midgland nodule  measuring 2.3 x 1.5 x 2.2 cm. No prior ultrasound. COMPOSITION: 2 points, solid or almost completely solid. ECHOGENICITY: 1 point, hyperechoic or isoechoic. SHAPE: 0 points, wider-than-tall. MARGIN: 0 points, smooth. ECHOGENIC FOCI: 0 points, none or large comet-tail artifacts. TI-RADS Classification: TR 3 (3 points), FNA if >/= 2.5 cm. Follow if >/= 1.5 cm. There are additional nodules of lesser size and/or TI-RADS score. These do not necessitate additional evaluation based on ACR criteria.     Impression: Asymmetric left thyroid enlargement with associated nodules corresponding to previous CT findings. The following meet current ACR criteria for recommending ultrasound guided biopsy: 1. LEFT anterior mid to lower pole 3.1 cm nodule (Image 33). 2. LEFT posterior midgland nodule measuring 2.3 cm (Image 36). Reference: ACR Thyroid Imaging, Reporting and Data System (TI-RADS): White Paper of the ACR TI-RADS Committee. J AM Winifred Radiol 2017;14:587-595. Additional recommendations based on American Thyroid Association 2015 guidelines. The study was marked in EPIC for significant notification and follow-up. Workstation performed: [id number]       I have personally reviewed labs, imaging studies, and pertinent reports.      This note has been generated by voice recognition software system.  Therefore, there may be spelling, grammar, and or syntax errors. Please contact if questions arise.    Simba Phillips,    Hematology and Medical Oncology - PGY V  [facility]

## 2025-06-23 NOTE — ASSESSMENT & PLAN NOTE
Controlled with last hemoglobin A1c in February 6 0.6%  Hold home oral regimen  Monitor on sliding scale  Consistent carb diet  Hypoglycemia protocol  Resume home regimen on discharge

## 2025-06-23 NOTE — ASSESSMENT & PLAN NOTE
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 coli bacteremia, LETY, and transaminitis.    Presents for inpatient chemotherapy with methotrexate and rituximab  Management per hematology/oncology service  Discharge home today, outpatient follow up with oncology

## 2025-06-23 NOTE — ASSESSMENT & PLAN NOTE
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 Established with Dr. Phan      On Q-14 days High-dose IV Methotrexate   C1 (4/18) 8 g/m²  C2 (5/2) 3 g/m², dose-reduced due to LETY, delayed x1 day due to E. coli bacteremia   C3 (5/21) 3 g/m²   C4 : 3 g/m² ; given on 6/17/25     S/p Rituximab x 5 doses 4/17/25 - 5/23/25 ; received rituximab 6/19/25 ; and tolerated it well      Patient had prescription for temozolomide total to 90 mg a day on days 7 through 11 (6/23 - 6/27) ; patient and his wife made aware and they confirmed having the medication available at home     PLAN   - Methotrexate level from 6/21, down to 0.1.   -Continue to monitor methotrexate daily until levels trend less than 0.05.  Patient can be discharged home on Monday once his serum methotrexate level is less than 0.05.  - Urine alkalization per protocol ; IV Na Bicarb, Q6H UA , keep PH > 7

## 2025-06-23 NOTE — PLAN OF CARE
Problem: PAIN - ADULT  Goal: Verbalizes/displays adequate comfort level or baseline comfort level  Description: Interventions:  - Encourage patient to monitor pain and request assistance  - Assess pain using appropriate pain scale  - Administer analgesics as ordered based on type and severity of pain and evaluate response  - Implement non-pharmacological measures as appropriate and evaluate response  - Consider cultural and social influences on pain and pain management  - Notify physician/advanced practitioner if interventions unsuccessful or patient reports new pain  - Educate patient/family on pain management process including their role and importance of  reporting pain   - Provide non-pharmacologic/complimentary pain relief interventions  Outcome: Progressing     Problem: INFECTION - ADULT  Goal: Absence or prevention of progression during hospitalization  Description: INTERVENTIONS:  - Assess and monitor for signs and symptoms of infection  - Monitor lab/diagnostic results  - Monitor all insertion sites, i.e. indwelling lines, tubes, and drains  - Monitor endotracheal if appropriate and nasal secretions for changes in amount and color  - [place] appropriate cooling/warming therapies per order  - Administer medications as ordered  - Instruct and encourage patient and family to use good hand hygiene technique  - Identify and instruct in appropriate isolation precautions for identified infection/condition  Outcome: Progressing  Goal: Absence of fever/infection during neutropenic period  Description: INTERVENTIONS:  - Monitor WBC  - Perform strict hand hygiene  - Limit to healthy visitors only  - No plants, dried, fresh or silk flowers with carrion in patient room  Outcome: Progressing     Problem: SAFETY ADULT  Goal: Patient will remain free of falls  Description: INTERVENTIONS:  - Educate patient/family on patient safety including physical limitations  - Instruct patient to call for assistance with activity   -  Consider consulting OT/PT to assist with strengthening/mobility based on AM PAC & JH-HLM score  - Consult OT/PT to assist with strengthening/mobility   - Keep Call bell within reach  - Keep bed low and locked with side rails adjusted as appropriate  - Keep care items and personal belongings within reach  - Initiate and maintain comfort rounds  - Make Fall Risk Sign visible to staff  - Offer Toileting every  Hours, in advance of need  - Initiate/Maintain alarm  - Obtain necessary fall risk management equipment:   - Apply yellow socks and bracelet for high fall risk patients  - Consider moving patient to room near nurses station  Outcome: Progressing  Goal: Maintain or return to baseline ADL function  Description: INTERVENTIONS:  -  Assess patient's ability to carry out ADLs; assess patient's baseline for ADL function and identify physical deficits which impact ability to perform ADLs (bathing, care of mouth/teeth, toileting, grooming, dressing, etc.)  - Assess/evaluate cause of self-care deficits   - Assess range of motion  - Assess patient's mobility; develop plan if impaired  - Assess patient's need for assistive devices and provide as appropriate  - Encourage maximum independence but intervene and supervise when necessary  - Involve family in performance of ADLs  - Assess for home care needs following discharge   - Consider OT consult to assist with ADL evaluation and planning for discharge  - Provide patient education as appropriate  - Monitor functional capacity and physical performance, use of AM PAC & JH-HLM   - Monitor gait, balance and fatigue with ambulation    Outcome: Progressing  Goal: Maintains/Returns to pre admission functional level  Description: INTERVENTIONS:  - Perform AM-PAC 6 Click Basic Mobility/ Daily Activity assessment daily.  - Set and communicate daily mobility goal to care team and patient/family/caregiver.   - Collaborate with rehabilitation services on mobility goals if consulted  -  Perform Range of Motion  times a day.  - Reposition patient every  hours.  - Dangle patient  times a day  - Stand patient  times a day  - Ambulate patient  times a day  - Out of bed to chair  times a day   - Out of bed for meals  times a day  - Out of bed for toileting  - Record patient progress and toleration of activity level   Outcome: Progressing     Problem: DISCHARGE PLANNING  Goal: Discharge to home or other facility with appropriate resources  Description: INTERVENTIONS:  - Identify barriers to discharge w/patient and caregiver  - Arrange for needed discharge resources and transportation as appropriate  - Identify discharge learning needs (meds, wound care, etc.)  - Arrange for interpretive services to assist at discharge as needed  - Refer to Case Management Department for coordinating discharge planning if the patient needs post-hospital services based on physician/advanced practitioner order or complex needs related to functional status, cognitive ability, or social support system  Outcome: Progressing     Problem: Knowledge Deficit  Goal: Patient/family/caregiver demonstrates understanding of disease process, treatment plan, medications, and discharge instructions  Description: Complete learning assessment and assess knowledge base.  Interventions:  - Provide teaching at level of understanding  - Provide teaching via preferred learning methods  Outcome: Progressing     Problem: Prexisting or High Potential for Compromised Skin Integrity  Goal: Skin integrity is maintained or improved  Description: INTERVENTIONS:  - Identify patients at risk for skin breakdown  - Assess and monitor skin integrity including under and around medical devices   - Assess and monitor nutrition and hydration status  - Monitor labs  - Assess for incontinence   - Turn and reposition patient  - Assist with mobility/ambulation  - Relieve pressure over antonieta prominences   - Avoid friction and shearing  - Provide appropriate hygiene  as needed including keeping skin clean and dry  - Evaluate need for skin moisturizer/barrier cream  - Collaborate with interdisciplinary team  - Patient/family teaching  - Consider wound care consult    Assess:  - Review [place] scale daily  - Clean and moisturize skin every   - Inspect skin when repositioning, toileting, and assisting with ADLS  - Assess under medical devices such as  every  - Assess extremities for adequate circulation and sensation     Bed Management:  - Have minimal linens on bed & keep smooth, unwrinkled  - Change linens as needed when moist or perspiring  - Avoid sitting or lying in one position for more than  hours while in bed?Keep HOB at degrees   - Toileting:  - Offer bedside commode  - Assess for incontinence every   - Use incontinent care products after each incontinent episode such as     Activity:  - Mobilize patient  times a day  - Encourage activity and walks on unit  - Encourage or provide ROM exercises   - Turn and reposition patient every  Hours  - Use appropriate equipment to lift or move patient in bed  - Instruct/ Assist with weight shifting every when out of bed in chair  - Consider limitation of chair time  hour intervals    Skin Care:  - Avoid use of baby powder, tape, friction and shearing, hot water or constrictive clothing  - Relieve pressure over bony prominences using   - Do not massage red bony areas    Next Steps:  - Teach patient strategies to minimize risks such as   - Consider consults to  interdisciplinary teams such as   Outcome: Progressing

## 2025-06-24 DIAGNOSIS — C83.390 PRIMARY CENTRAL NERVOUS SYSTEM (CNS) LYMPHOMA: Primary | ICD-10-CM

## 2025-06-24 DIAGNOSIS — C83.390 PRIMARY CNS LYMPHOMA: ICD-10-CM

## 2025-06-24 RX ORDER — SODIUM BICARBONATE 650 MG/1
650 TABLET ORAL EVERY 6 HOURS
Qty: 4 TABLET | Refills: 0 | Status: SHIPPED | OUTPATIENT
Start: 2025-06-24 | End: 2025-06-25

## 2025-06-24 RX ORDER — ONDANSETRON 8 MG/1
8 TABLET, FILM COATED ORAL EVERY 8 HOURS PRN
Qty: 20 TABLET | Refills: 0 | Status: SHIPPED | OUTPATIENT
Start: 2025-06-24

## 2025-06-24 NOTE — UTILIZATION REVIEW
NOTIFICATION OF ADMISSION DISCHARGE   This is a Notification of Discharge from [facility]. Please be advised that this patient has been discharge from our facility. Below you will find the admission and discharge date and time including the patient’s disposition.   UTILIZATION REVIEW CONTACT:  Utilization Review Assistants  Network Utilization Review Department  Phone: [phone] x carefully listen to the prompts. All voicemails are confidential.  Email: [email or web address]     ADMISSION INFORMATION  PRESENTATION DATE: 6/16/2025  8:08 AM  OBERVATION ADMISSION DATE: N/A  INPATIENT ADMISSION DATE: 6/16/25  8:08 AM   DISCHARGE DATE: 6/23/2025  2:13 PM   DISPOSITION:Home/Self Care    Network Utilization Review Department  ATTENTION: Please call with any questions or concerns to [phone] and carefully listen to the prompts so that you are directed to the right person. All voicemails are confidential.   For Discharge needs, contact Care Management DC Support Team at [phone] opt. 2  Send all requests for admission clinical reviews, approved or denied determinations and any other requests to dedicated fax number below belonging to the campus where the patient is receiving treatment. List of dedicated fax numbers for the Facilities:  FACILITY NAME UR FAX NUMBER   ADMISSION DENIALS (Administrative/Medical Necessity) [phone]   DISCHARGE SUPPORT TEAM ([facility]) [phone]   PARENT CHILD HEALTH (Maternity/NICU/Pediatrics) [phone]   [facility] [phone]   [facility] [phone]   [facility] [phone]   [facility] [phone]   [facility] [phone]   [facility] [phone]   [facility] [phone]   [facility] [phone]   [facility]  [facility] [phone]   Critical access hospital [phone]   [facility] [phone]   [facility] [phone]

## 2025-06-25 ENCOUNTER — HOSPITAL ENCOUNTER (OUTPATIENT)
Dept: MRI IMAGING | Facility: HOSPITAL | Age: 65
Discharge: HOME | End: 2025-06-25
Attending: INTERNAL MEDICINE
Payer: COMMERCIAL

## 2025-06-25 DIAGNOSIS — C83.390 PRIMARY CNS LYMPHOMA: Primary | ICD-10-CM

## 2025-06-25 DIAGNOSIS — N39.0 URINARY TRACT INFECTION WITHOUT HEMATURIA, SITE UNSPECIFIED: ICD-10-CM

## 2025-06-25 PROCEDURE — 70553 MRI BRAIN STEM W/O & W/DYE: CPT

## 2025-06-25 PROCEDURE — A9585 GADOBUTROL INJECTION: HCPCS | Performed by: INTERNAL MEDICINE

## 2025-06-25 RX ORDER — GADOBUTROL 604.72 MG/ML
10 INJECTION INTRAVENOUS
Status: COMPLETED | OUTPATIENT
Start: 2025-06-25 | End: 2025-06-25

## 2025-06-25 RX ADMIN — GADOBUTROL 10 ML: 604.72 INJECTION INTRAVENOUS at 18:30

## 2025-06-26 ENCOUNTER — APPOINTMENT (OUTPATIENT)
Dept: LAB | Facility: HOSPITAL | Age: 65
End: 2025-06-26
Payer: COMMERCIAL

## 2025-06-26 DIAGNOSIS — C83.390 PRIMARY CNS LYMPHOMA: Primary | ICD-10-CM

## 2025-06-26 DIAGNOSIS — N39.0 URINARY TRACT INFECTION WITHOUT HEMATURIA, SITE UNSPECIFIED: ICD-10-CM

## 2025-06-26 DIAGNOSIS — C83.390 PRIMARY CNS LYMPHOMA: ICD-10-CM

## 2025-06-26 DIAGNOSIS — C83.390 PRIMARY CENTRAL NERVOUS SYSTEM (CNS) LYMPHOMA: ICD-10-CM

## 2025-06-26 LAB
ALBUMIN SERPL BCG-MCNC: 4.1 G/DL (ref 3.5–5)
ALP SERPL-CCNC: 69 U/L (ref 34–104)
ALT SERPL W P-5'-P-CCNC: 23 U/L (ref 7–52)
ANION GAP SERPL CALCULATED.3IONS-SCNC: 7 MMOL/L (ref 4–13)
AST SERPL W P-5'-P-CCNC: 12 U/L (ref 13–39)
BACTERIA UR QL AUTO: ABNORMAL /HPF
BASOPHILS # BLD AUTO: 0.06 THOUSANDS/ÂΜL (ref 0–0.1)
BASOPHILS NFR BLD AUTO: 1 % (ref 0–1)
BILIRUB SERPL-MCNC: 0.58 MG/DL (ref 0.2–1)
BILIRUB UR QL STRIP: NEGATIVE
BUN SERPL-MCNC: 23 MG/DL (ref 5–25)
CALCIUM SERPL-MCNC: 9.5 MG/DL (ref 8.4–10.2)
CHLORIDE SERPL-SCNC: 104 MMOL/L (ref 96–108)
CLARITY UR: CLEAR
CO2 SERPL-SCNC: 29 MMOL/L (ref 21–32)
COLOR UR: YELLOW
CREAT SERPL-MCNC: 1.14 MG/DL (ref 0.6–1.3)
EOSINOPHIL # BLD AUTO: 0.69 THOUSAND/ÂΜL (ref 0–0.61)
EOSINOPHIL NFR BLD AUTO: 8 % (ref 0–6)
ERYTHROCYTE [DISTWIDTH] IN BLOOD BY AUTOMATED COUNT: 16.7 % (ref 11.6–15.1)
GFR SERPL CREATININE-BSD FRML MDRD: 67 ML/MIN/1.73SQ M
GLUCOSE SERPL-MCNC: 95 MG/DL (ref 65–140)
GLUCOSE UR STRIP-MCNC: ABNORMAL MG/DL
HCT VFR BLD AUTO: 29.5 % (ref 36.5–49.3)
HGB BLD-MCNC: 9.4 G/DL (ref 12–17)
HGB UR QL STRIP.AUTO: NEGATIVE
IMM GRANULOCYTES # BLD AUTO: 0.08 THOUSAND/UL (ref 0–0.2)
IMM GRANULOCYTES NFR BLD AUTO: 1 % (ref 0–2)
KETONES UR STRIP-MCNC: NEGATIVE MG/DL
LEUKOCYTE ESTERASE UR QL STRIP: ABNORMAL
LYMPHOCYTES # BLD AUTO: 1.96 THOUSANDS/ÂΜL (ref 0.6–4.47)
LYMPHOCYTES NFR BLD AUTO: 22 % (ref 14–44)
MCH RBC QN AUTO: 30.8 PG (ref 26.8–34.3)
MCHC RBC AUTO-ENTMCNC: 31.9 G/DL (ref 31.4–37.4)
MCV RBC AUTO: 97 FL (ref 82–98)
MONOCYTES # BLD AUTO: 0.59 THOUSAND/ÂΜL (ref 0.17–1.22)
MONOCYTES NFR BLD AUTO: 7 % (ref 4–12)
MUCOUS THREADS UR QL AUTO: ABNORMAL
NEUTROPHILS # BLD AUTO: 5.37 THOUSANDS/ÂΜL (ref 1.85–7.62)
NEUTS SEG NFR BLD AUTO: 61 % (ref 43–75)
NITRITE UR QL STRIP: NEGATIVE
NON-SQ EPI CELLS URNS QL MICRO: ABNORMAL /HPF
NRBC BLD AUTO-RTO: 0 /100 WBCS
PH UR STRIP.AUTO: 5.5 [PH]
PLATELET # BLD AUTO: 176 THOUSANDS/UL (ref 149–390)
PMV BLD AUTO: 10.6 FL (ref 8.9–12.7)
POTASSIUM SERPL-SCNC: 4.3 MMOL/L (ref 3.5–5.3)
PROT SERPL-MCNC: 6.3 G/DL (ref 6.4–8.4)
PROT UR STRIP-MCNC: ABNORMAL MG/DL
RBC # BLD AUTO: 3.05 MILLION/UL (ref 3.88–5.62)
RBC #/AREA URNS AUTO: ABNORMAL /HPF
SODIUM SERPL-SCNC: 140 MMOL/L (ref 135–147)
SP GR UR STRIP.AUTO: 1.02 (ref 1–1.03)
UROBILINOGEN UR STRIP-ACNC: <2 MG/DL
WBC # BLD AUTO: 8.75 THOUSAND/UL (ref 4.31–10.16)
WBC #/AREA URNS AUTO: ABNORMAL /HPF

## 2025-06-26 PROCEDURE — 85025 COMPLETE CBC W/AUTO DIFF WBC: CPT

## 2025-06-26 PROCEDURE — 36415 COLL VENOUS BLD VENIPUNCTURE: CPT

## 2025-06-26 PROCEDURE — 87086 URINE CULTURE/COLONY COUNT: CPT

## 2025-06-26 PROCEDURE — 80053 COMPREHEN METABOLIC PANEL: CPT

## 2025-06-26 PROCEDURE — 81001 URINALYSIS AUTO W/SCOPE: CPT

## 2025-06-27 ENCOUNTER — TELEPHONE (OUTPATIENT)
Dept: HEMATOLOGY ONCOLOGY | Facility: CLINIC | Age: 65
End: 2025-06-27

## 2025-06-27 DIAGNOSIS — C83.390 PRIMARY CNS LYMPHOMA: Primary | ICD-10-CM

## 2025-06-27 LAB — BACTERIA UR CULT: NORMAL

## 2025-06-27 RX ORDER — SODIUM BICARBONATE 650 MG/1
650 TABLET ORAL EVERY 6 HOURS
Qty: 8 TABLET | Refills: 0 | Status: SHIPPED | OUTPATIENT
Start: 2025-06-28 | End: 2025-06-30

## 2025-06-27 NOTE — TELEPHONE ENCOUNTER
I phoned the patient's daughter, Elizabeth, introduced myself, and indicated that I was calling from [facility], Dr. Phan's office, to provide a reminder for her Dad's upcoming admission on Monday 6/30 to [facility] in [place] for his chemotherapy. Elizabeth indicated that she and her Dad are aware of the admission and how to reach P9. We reviewed that they can arrive to the floor on Monday as close to 0800 as they are safely able. Elizabeth expressed understanding and indicated that her Dad will start the bicarbonate tomorrow, per Nancy's instructions. She was appreciative of the call.

## 2025-06-30 ENCOUNTER — HOSPITAL ENCOUNTER (INPATIENT)
Facility: HOSPITAL | Age: 65
LOS: 5 days | Discharge: HOME | DRG: 847 | End: 2025-07-05
Attending: INTERNAL MEDICINE | Admitting: STUDENT IN AN ORGANIZED HEALTH CARE EDUCATION/TRAINING PROGRAM
Payer: COMMERCIAL

## 2025-06-30 DIAGNOSIS — C83.390 PRIMARY CNS LYMPHOMA: Primary | ICD-10-CM

## 2025-06-30 LAB
ALBUMIN SERPL BCG-MCNC: 3.6 G/DL (ref 3.5–5)
ALP SERPL-CCNC: 81 U/L (ref 34–104)
ALT SERPL W P-5'-P-CCNC: 13 U/L (ref 7–52)
ANION GAP SERPL CALCULATED.3IONS-SCNC: 5 MMOL/L (ref 4–13)
AST SERPL W P-5'-P-CCNC: 11 U/L (ref 13–39)
BACTERIA UR QL AUTO: ABNORMAL /HPF
BILIRUB SERPL-MCNC: 0.3 MG/DL (ref 0.2–1)
BILIRUB UR QL STRIP: NEGATIVE
BUN SERPL-MCNC: 18 MG/DL (ref 5–25)
CALCIUM SERPL-MCNC: 8.6 MG/DL (ref 8.4–10.2)
CHLORIDE SERPL-SCNC: 104 MMOL/L (ref 96–108)
CLARITY UR: CLEAR
CO2 SERPL-SCNC: 31 MMOL/L (ref 21–32)
COLOR UR: ABNORMAL
COLOR UR: COLORLESS
COLOR UR: YELLOW
CREAT SERPL-MCNC: 0.94 MG/DL (ref 0.6–1.3)
ERYTHROCYTE [DISTWIDTH] IN BLOOD BY AUTOMATED COUNT: 16.8 % (ref 11.6–15.1)
GFR SERPL CREATININE-BSD FRML MDRD: 84 ML/MIN/1.73SQ M
GLUCOSE SERPL-MCNC: 157 MG/DL (ref 65–140)
GLUCOSE SERPL-MCNC: 161 MG/DL (ref 65–140)
GLUCOSE SERPL-MCNC: 394 MG/DL (ref 65–140)
GLUCOSE UR STRIP-MCNC: ABNORMAL MG/DL
GLUCOSE UR STRIP-MCNC: ABNORMAL MG/DL
GLUCOSE UR STRIP-MCNC: NEGATIVE MG/DL
HCT VFR BLD AUTO: 25.5 % (ref 36.5–49.3)
HGB BLD-MCNC: 8.5 G/DL (ref 12–17)
HGB UR QL STRIP.AUTO: ABNORMAL
HGB UR QL STRIP.AUTO: NEGATIVE
HGB UR QL STRIP.AUTO: NEGATIVE
KETONES UR STRIP-MCNC: NEGATIVE MG/DL
LEUKOCYTE ESTERASE UR QL STRIP: ABNORMAL
LEUKOCYTE ESTERASE UR QL STRIP: NEGATIVE
LEUKOCYTE ESTERASE UR QL STRIP: NEGATIVE
MCH RBC QN AUTO: 32.1 PG (ref 26.8–34.3)
MCHC RBC AUTO-ENTMCNC: 33.3 G/DL (ref 31.4–37.4)
MCV RBC AUTO: 96 FL (ref 82–98)
NITRITE UR QL STRIP: NEGATIVE
NON-SQ EPI CELLS URNS QL MICRO: ABNORMAL /HPF
PH UR STRIP.AUTO: 7.5 [PH]
PLATELET # BLD AUTO: 143 THOUSANDS/UL (ref 149–390)
PMV BLD AUTO: 10.4 FL (ref 8.9–12.7)
POTASSIUM SERPL-SCNC: 4 MMOL/L (ref 3.5–5.3)
PROT SERPL-MCNC: 5.5 G/DL (ref 6.4–8.4)
PROT UR STRIP-MCNC: ABNORMAL MG/DL
PROT UR STRIP-MCNC: NEGATIVE MG/DL
PROT UR STRIP-MCNC: NEGATIVE MG/DL
RBC # BLD AUTO: 2.65 MILLION/UL (ref 3.88–5.62)
RBC #/AREA URNS AUTO: ABNORMAL /HPF
SODIUM SERPL-SCNC: 140 MMOL/L (ref 135–147)
SP GR UR STRIP.AUTO: 1 (ref 1–1.03)
SP GR UR STRIP.AUTO: 1.01 (ref 1–1.03)
SP GR UR STRIP.AUTO: 1.01 (ref 1–1.03)
UROBILINOGEN UR STRIP-ACNC: <2 MG/DL
WBC # BLD AUTO: 6.33 THOUSAND/UL (ref 4.31–10.16)
WBC #/AREA URNS AUTO: ABNORMAL /HPF

## 2025-06-30 PROCEDURE — 82948 REAGENT STRIP/BLOOD GLUCOSE: CPT

## 2025-06-30 PROCEDURE — 81001 URINALYSIS AUTO W/SCOPE: CPT | Performed by: INTERNAL MEDICINE

## 2025-06-30 PROCEDURE — 3E03305 INTRODUCTION OF OTHER ANTINEOPLASTIC INTO PERIPHERAL VEIN, PERCUTANEOUS APPROACH: ICD-10-PCS | Performed by: FAMILY MEDICINE

## 2025-06-30 PROCEDURE — 81003 URINALYSIS AUTO W/O SCOPE: CPT | Performed by: INTERNAL MEDICINE

## 2025-06-30 PROCEDURE — 80053 COMPREHEN METABOLIC PANEL: CPT | Performed by: STUDENT IN AN ORGANIZED HEALTH CARE EDUCATION/TRAINING PROGRAM

## 2025-06-30 PROCEDURE — 99223 1ST HOSP IP/OBS HIGH 75: CPT | Performed by: STUDENT IN AN ORGANIZED HEALTH CARE EDUCATION/TRAINING PROGRAM

## 2025-06-30 PROCEDURE — 85027 COMPLETE CBC AUTOMATED: CPT | Performed by: STUDENT IN AN ORGANIZED HEALTH CARE EDUCATION/TRAINING PROGRAM

## 2025-06-30 PROCEDURE — 99255 IP/OBS CONSLTJ NEW/EST HI 80: CPT | Performed by: INTERNAL MEDICINE

## 2025-06-30 RX ORDER — ATORVASTATIN CALCIUM 20 MG/1
20 TABLET, FILM COATED ORAL DAILY
Status: DISCONTINUED | OUTPATIENT
Start: 2025-06-30 | End: 2025-07-05 | Stop reason: HOSPADM

## 2025-06-30 RX ORDER — SENNOSIDES 8.6 MG
17.2 TABLET ORAL
Status: DISCONTINUED | OUTPATIENT
Start: 2025-06-30 | End: 2025-07-05 | Stop reason: HOSPADM

## 2025-06-30 RX ORDER — PANTOPRAZOLE SODIUM 40 MG/1
40 TABLET, DELAYED RELEASE ORAL
Status: DISCONTINUED | OUTPATIENT
Start: 2025-06-30 | End: 2025-07-05 | Stop reason: HOSPADM

## 2025-06-30 RX ORDER — ALLOPURINOL 300 MG/1
300 TABLET ORAL DAILY
Status: DISCONTINUED | OUTPATIENT
Start: 2025-06-30 | End: 2025-07-05 | Stop reason: HOSPADM

## 2025-06-30 RX ORDER — ONDANSETRON 4 MG/1
4 TABLET, ORALLY DISINTEGRATING ORAL EVERY 8 HOURS PRN
Status: DISCONTINUED | OUTPATIENT
Start: 2025-06-30 | End: 2025-07-05 | Stop reason: HOSPADM

## 2025-06-30 RX ORDER — INSULIN LISPRO 100 [IU]/ML
1-6 INJECTION, SOLUTION INTRAVENOUS; SUBCUTANEOUS
Status: DISCONTINUED | OUTPATIENT
Start: 2025-06-30 | End: 2025-07-05 | Stop reason: HOSPADM

## 2025-06-30 RX ORDER — HEPARIN SODIUM 5000 [USP'U]/ML
5000 INJECTION, SOLUTION INTRAVENOUS; SUBCUTANEOUS EVERY 8 HOURS SCHEDULED
Status: DISCONTINUED | OUTPATIENT
Start: 2025-06-30 | End: 2025-07-05 | Stop reason: HOSPADM

## 2025-06-30 RX ORDER — QUETIAPINE FUMARATE 25 MG/1
12.5 TABLET, FILM COATED ORAL 2 TIMES DAILY
Status: DISCONTINUED | OUTPATIENT
Start: 2025-06-30 | End: 2025-07-05 | Stop reason: HOSPADM

## 2025-06-30 RX ORDER — DOCUSATE SODIUM 100 MG/1
100 CAPSULE, LIQUID FILLED ORAL 2 TIMES DAILY
Status: DISCONTINUED | OUTPATIENT
Start: 2025-06-30 | End: 2025-07-05 | Stop reason: HOSPADM

## 2025-06-30 RX ORDER — SODIUM CHLORIDE 9 MG/ML
20 INJECTION, SOLUTION INTRAVENOUS ONCE AS NEEDED
Status: DISCONTINUED | OUTPATIENT
Start: 2025-06-30 | End: 2025-07-05 | Stop reason: HOSPADM

## 2025-06-30 RX ADMIN — QUETIAPINE FUMARATE 12.5 MG: 25 TABLET ORAL at 12:03

## 2025-06-30 RX ADMIN — SODIUM BICARBONATE 150 ML/HR: 84 INJECTION, SOLUTION INTRAVENOUS at 10:31

## 2025-06-30 RX ADMIN — SODIUM BICARBONATE 150 ML/HR: 84 INJECTION, SOLUTION INTRAVENOUS at 17:13

## 2025-06-30 RX ADMIN — DOCUSATE SODIUM 100 MG: 100 CAPSULE, LIQUID FILLED ORAL at 18:30

## 2025-06-30 RX ADMIN — HEPARIN SODIUM 5000 UNITS: 5000 INJECTION INTRAVENOUS; SUBCUTANEOUS at 21:29

## 2025-06-30 RX ADMIN — METHOTREXATE 6000 MG: 25 INJECTION, SOLUTION INTRA-ARTERIAL; INTRAMUSCULAR; INTRATHECAL; INTRAVENOUS at 15:57

## 2025-06-30 RX ADMIN — SODIUM CHLORIDE 20 ML/HR: 0.9 INJECTION, SOLUTION INTRAVENOUS at 14:45

## 2025-06-30 RX ADMIN — PANTOPRAZOLE SODIUM 40 MG: 40 TABLET, DELAYED RELEASE ORAL at 12:03

## 2025-06-30 RX ADMIN — INSULIN LISPRO 1 UNITS: 100 INJECTION, SOLUTION INTRAVENOUS; SUBCUTANEOUS at 17:14

## 2025-06-30 RX ADMIN — HEPARIN SODIUM 5000 UNITS: 5000 INJECTION INTRAVENOUS; SUBCUTANEOUS at 14:45

## 2025-06-30 RX ADMIN — QUETIAPINE FUMARATE 12.5 MG: 25 TABLET ORAL at 21:29

## 2025-06-30 RX ADMIN — SENNOSIDES 17.2 MG: 8.6 TABLET, FILM COATED ORAL at 12:03

## 2025-06-30 RX ADMIN — DEXAMETHASONE SODIUM PHOSPHATE: 10 INJECTION, SOLUTION INTRAMUSCULAR; INTRAVENOUS at 14:45

## 2025-06-30 NOTE — ASSESSMENT & PLAN NOTE
"Lab Results   Component Value Date    HGBA1C 5.5 06/16/2025       No results for input(s): \"POCGLU\" in the last 72 hours.    Blood Sugar Average: Last 72 hrs:  PTA Janumet, holding  Insulin sliding scale while inpatient  Hypoglycemia protocol    "

## 2025-06-30 NOTE — H&P
"H&P - Hospitalist   Name: Alexis Dennison 64 y.o. 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, double expressor (BCL2+/MYC+), DLBCL NOS, non-germinal center, Ki-67 60-70, BCL6 rearrangement   Has been receiving inpatient high-dose methotrexate and rituximab in outpatient temozolomide 90 mg a day on day 7-11  Admitted for initiation of cycle 5  Oncology consult  Virtual one-to-one for safety  Supportive care with as needed medications for nausea, vomiting, pain    Type 2 diabetes mellitus with hyperglycemia, without long-term current use of insulin ([facility])  Lab Results   Component Value Date    HGBA1C 5.5 06/16/2025       No results for input(s): \"POCGLU\" in the last 72 hours.    Blood Sugar Average: Last 72 hrs:  PTA Janumet, holding  Insulin sliding scale while inpatient  Hypoglycemia protocol    HTN (hypertension)  Not on antihypertensives PTA  BP reviewed and stable  Continue to monitor  Anemia  Normocytic  Baseline hemoglobin 8-10  Hemoglobin admission 8.5  No recent iron profile, will order  Continue to monitor and transfuse less than 7      VTE Pharmacologic Prophylaxis: VTE Score: 4 Moderate Risk (Score 3-4) - Pharmacological DVT Prophylaxis Ordered: heparin.  Code Status: Level 1 - Full Code   Discussion with family: Updated  (daughter) via phone.    Anticipated Length of Stay: Patient will be admitted on an inpatient basis with an anticipated length of stay of greater than 2 midnights secondary to chemotherapy.    History of Present Illness   Chief Complaint: Chemotherapy    Alexis Dennison is a 64 y.o. male with a PMH of recently diagnosed CNS lymphoma, type 2 diabetes, hyperlipidemia and hypertension who presents for inpatient chemotherapy.  Patient was recently diagnosed in April 2025 with primary CNS lymphoma and was started on chemotherapy on April " 18.  He is currently admitted for initiation of cycle 5.    Patient has no complaints this morning.  Denies pain.    Review of Systems   Constitutional:  Negative for chills and fever.   Respiratory:  Negative for cough and shortness of breath.    Cardiovascular:  Negative for chest pain and leg swelling.   Gastrointestinal:  Negative for nausea and vomiting.   Genitourinary:  Negative for difficulty urinating and dysuria.   Musculoskeletal:  Negative for back pain and gait problem.   Neurological:  Negative for dizziness and light-headedness.       Historical Information   Past Medical History[1]  Past Surgical History[2]  Social History[3]  E-Cigarette/Vaping    E-Cigarette Use Never User      E-Cigarette/Vaping Substances    Nicotine No     THC No     CBD No     Flavoring No     Other No     Unknown No      Family History[4]  Social History:  Marital Status: /Civil Union   Occupation: Not on file  Patient Pre-hospital Living Situation: Home  Patient Pre-hospital Level of Mobility: walks  Patient Pre-hospital Diet Restrictions: None    Meds/Allergies   I have reviewed home medications using recent Epic encounter.  Prior to Admission medications    Medication Sig Start Date End Date Taking? Authorizing Provider   acetaminophen (TYLENOL) 325 mg tablet Take 2 tablets (650 mg total) by mouth every 6 (six) hours as needed for mild pain, moderate pain, severe pain, headaches or fever 5/27/25   Joshua Kaminski,    allopurinol (ZYLOPRIM) 300 mg tablet Take 1 tablet (300 mg total) by mouth daily 5/28/25   PATI Porras   atorvastatin (LIPITOR) 20 mg tablet Take 20 mg by mouth in the morning.    Historical Provider, MD   docusate sodium (COLACE) 100 mg capsule Take 1 capsule (100 mg total) by mouth 2 (two) times a day 5/20/25   Jessica Arreola,    leucovorin (WELLCOVORIN) 25 mg tablet Take 1 tablet (25 mg total) by mouth every 6 (six) hours for 4 doses 6/23/25 6/24/25  Simba Phillips,    omeprazole  (PriLOSEC) 20 mg delayed release capsule Take 1 capsule (20 mg total) by mouth daily 5/28/25   PATI Porras   ondansetron (ZOFRAN) 8 mg tablet Take 1 tablet (8 mg total) by mouth every 8 (eight) hours as needed for nausea or vomiting 6/24/25   Leta Phan MD   QUEtiapine (SEROquel) 25 mg tablet Take 0.5 tablets (12.5 mg total) by mouth at noon and 0.5 tablets (12.5 mg total) before bedtime. 5/27/25   Joshua Kaminski DO   senna (SENOKOT) 8.6 mg Take 2 tablets (17.2 mg total) by mouth daily with lunch 5/21/25   Jessica Arreola DO   SITaglip Phos-metFORMIN HCl ER (Janumet XR)  MG TB24 Take 1 tablet by mouth in the morning 5/29/25   Edvin Dior PA-C   sodium bicarbonate 650 mg tablet Take 1 tablet (650 mg total) by mouth every 6 (six) hours for 2 days Do not start before June 28, 2025. 6/28/25 6/30/25  Leta Phan MD   temozolomide (TEMODAR) 20 mg capsule Take 2 capsules (40 mg total) by mouth daily On days 7 through 11 of each cycle 6/5/25   Leta Phan MD   temozolomide (TEMODAR) 250 MG capsule Take 1 capsule (250 mg total) by mouth daily On days 7 through 11 of each cycle 6/5/25   Leta Phan MD     No Known Allergies    Objective :  Temp:  [97.8 °F (36.6 °C)] 97.8 °F (36.6 °C)  HR:  [76] 76  BP: (118)/(72) 118/72  Resp:  [18] 18  SpO2:  [98 %] 98 %  O2 Device: None (Room air)    Physical Exam  Vitals and nursing note reviewed.   Constitutional:       General: He is not in acute distress.     Appearance: He is well-developed.   HENT:      Head: Normocephalic and atraumatic.     Eyes:      Conjunctiva/sclera: Conjunctivae normal.       Cardiovascular:      Rate and Rhythm: Normal rate and regular rhythm.      Heart sounds: No murmur heard.  Pulmonary:      Effort: Pulmonary effort is normal. No respiratory distress.      Breath sounds: Normal breath sounds. No wheezing or rhonchi.     Musculoskeletal:         General: No swelling.      Cervical  back: Neck supple.     Skin:     General: Skin is warm and dry.      Capillary Refill: Capillary refill takes less than 2 seconds.     Neurological:      Mental Status: He is alert.     Psychiatric:         Mood and Affect: Mood normal.         Behavior: Behavior normal.          Lines/Drains:  Lines/Drains/Airways       Active Status       Name Placement date Placement time Site Days    PICC Line 06/30/25 Left Brachial 06/30/25  1005  Brachial  less than 1                    Central Line:  Goal for removal: Port accessed. Will de-access as appropriate.             Lab Results: I have reviewed the following results:  Results from last 7 days   Lab Units 06/30/25  1036 06/26/25  0854   WBC Thousand/uL 6.33 8.75   HEMOGLOBIN g/dL 8.5* 9.4*   HEMATOCRIT % 25.5* 29.5*   PLATELETS Thousands/uL 143* 176   SEGS PCT %  --  61   LYMPHO PCT %  --  22   MONO PCT %  --  7   EOS PCT %  --  8*     Results from last 7 days   Lab Units 06/30/25  1036   SODIUM mmol/L 140   POTASSIUM mmol/L 4.0   CHLORIDE mmol/L 104   CO2 mmol/L 31   BUN mg/dL 18   CREATININE mg/dL 0.94   ANION GAP mmol/L 5   CALCIUM mg/dL 8.6   ALBUMIN g/dL 3.6   TOTAL BILIRUBIN mg/dL 0.30   ALK PHOS U/L 81   ALT U/L 13   AST U/L 11*   GLUCOSE RANDOM mg/dL 161*             Lab Results   Component Value Date    HGBA1C 5.5 06/16/2025    HGBA1C 6.6 (H) 02/22/2025    HGBA1C 6.3 (H) 11/09/2024           Imaging Results Review: No pertinent imaging studies reviewed.  Other Study Results Review: No additional pertinent studies reviewed.    Administrative Statements       ** Please Note: This note has been constructed using a voice recognition system. **         [1]   Past Medical History:  Diagnosis Date    Colon polyp     Diabetes mellitus (HCC)     GERD (gastroesophageal reflux disease)     Hyperlipidemia     Hypertension     Liver disease     Sleep apnea    [2]   Past Surgical History:  Procedure Laterality Date    COLONOSCOPY      CYST REMOVAL      back    FL LUMBAR  PUNCTURE DIAGNOSTIC  3/31/2025    FL LUMBAR PUNCTURE DIAGNOSTIC  4/7/2025    MI EXC B9 LESION MRGN XCP SK TG T/A/L 0.6-1.0 CM N/A 6/7/2023    Procedure: EXCISION  BIOPSY LESION/MASS ABDOMINAL/CHEST WALL;  Surgeon: Trevor Villavicencio MD;  Location: [facility];  Service: General    THIRD VENTRICULOSTOMY Left 4/14/2025    Procedure: Left frontal endoscopic biopsy of ventricular mass and placement of EVD;  Surgeon: Paul Causey MD;  Location: [facility];  Service: Neurosurgery   [3]   Social History  Tobacco Use    Smoking status: Never     Passive exposure: Never    Smokeless tobacco: Never    Tobacco comments:     N/a   Vaping Use    Vaping status: Never Used   Substance and Sexual Activity    Alcohol use: Yes     Alcohol/week: 1.0 standard drink of alcohol     Types: 1 Cans of beer per week     Comment: socially    Drug use: Never    Sexual activity: Not Currently     Partners: Female   [4]   Family History  Problem Relation Name Age of Onset    Cancer Mother Kellen Tross     Cancer Father Amrit Juma     Diabetes Paternal Grandfather Shaileshtristian CalixJuma     Diabetes unspecified Paternal Grandfather Shailesh Dennison         Type 2    Colon polyps Neg Hx      Colon cancer Neg Hx

## 2025-06-30 NOTE — PLAN OF CARE
Problem: Prexisting or High Potential for Compromised Skin Integrity  Goal: Skin integrity is maintained or improved  Description: INTERVENTIONS:  - Identify patients at risk for skin breakdown  - Assess and monitor skin integrity including under and around medical devices   - Assess and monitor nutrition and hydration status  - Monitor labs  - Assess for incontinence   - Turn and reposition patient  - Assist with mobility/ambulation  - Relieve pressure over antonieta prominences   - Avoid friction and shearing  - Provide appropriate hygiene as needed including keeping skin clean and dry  - Evaluate need for skin moisturizer/barrier cream  - Collaborate with interdisciplinary team  - Patient/family teaching  - Consider wound care consult    Assess:  - Review [place] scale daily  - Inspect skin when repositioning, toileting, and assisting with ADLS  - Assess extremities for adequate circulation and sensation     Bed Management:  - Have minimal linens on bed & keep smooth, unwrinkled  - Change linens as needed when moist or perspiring  - Avoid sitting or lying in one position  - Toileting:  - Offer bedside commode  - Assess for incontinence  - Use incontinent care products after each incontinent episode    Activity:  - Mobilize patient  - Encourage activity and walks on unit  - Encourage or provide ROM exercises   - Turn and reposition patient  - Use appropriate equipment to lift or move patient in bed  - Instruct/ Assist with weight shifting     Skin Care:  - Avoid use of baby powder, tape, friction and shearing, hot water or constrictive clothing  - Relieve pressure over bony prominences   - Do not massage red bony areas  Outcome: Progressing     Problem: INFECTION - ADULT  Goal: Absence or prevention of progression during hospitalization  Description: INTERVENTIONS:  - Assess and monitor for signs and symptoms of infection  - Monitor lab/diagnostic results  - Monitor all insertion sites, i.e. indwelling lines, tubes,  and drains  - Monitor endotracheal if appropriate and nasal secretions for changes in amount and color  - [place] appropriate cooling/warming therapies per order  - Administer medications as ordered  - Instruct and encourage patient and family to use good hand hygiene technique  - Identify and instruct in appropriate isolation precautions for identified infection/condition  Outcome: Progressing  Goal: Absence of fever/infection during neutropenic period  Description: INTERVENTIONS:  - Monitor WBC  - Perform strict hand hygiene  - Limit to healthy visitors only  - No plants, dried, fresh or silk flowers with carrion in patient room  Outcome: Progressing     Problem: SAFETY ADULT  Goal: Patient will remain free of falls  Description: INTERVENTIONS:  - Educate patient/family on patient safety including physical limitations  - Instruct patient to call for assistance with activity   - Consider consulting OT/PT to assist with strengthening/mobility based on AM PAC & JH-HLM score  - Consult OT/PT to assist with strengthening/mobility   - Keep Call bell within reach  - Keep bed low and locked with side rails adjusted as appropriate  - Keep care items and personal belongings within reach  - Initiate and maintain comfort rounds  - Make Fall Risk Sign visible to staff  - Offer Toileting every 2 Hours, in advance of need  - Initiate/Maintain alarm  - Obtain necessary fall risk management equipment:   - Apply yellow socks and bracelet for high fall risk patients  - Consider moving patient to room near nurses station  Outcome: Progressing     Problem: DISCHARGE PLANNING  Goal: Discharge to home or other facility with appropriate resources  Description: INTERVENTIONS:  - Identify barriers to discharge w/patient and caregiver  - Arrange for needed discharge resources and transportation as appropriate  - Identify discharge learning needs (meds, wound care, etc.)  - Arrange for interpretive services to assist at discharge as needed  -  Refer to Case Management Department for coordinating discharge planning if the patient needs post-hospital services based on physician/advanced practitioner order or complex needs related to functional status, cognitive ability, or social support system  Outcome: Progressing

## 2025-06-30 NOTE — PROCEDURES
Venous Access Line Insertion    Date/Time: 6/30/2025 10:04 AM    Performed by: Sameera Hardin RN  Authorized by: Dionne Huang MD    Patient location:  Bedside  Other Assisting Provider: Yes (comment) (Maddie MARIN)    Consent:     Consent obtained:  Written and verbal    Consent given by:  Patient    Risks discussed:  Arterial puncture, bleeding, infection, incorrect placement, pneumothorax and nerve damage    Alternatives discussed:  No treatment and delayed treatment  Universal protocol:     Procedure explained and questions answered to patient or proxy's satisfaction: yes      Immediately prior to procedure, a time out was called: yes      Site/side marked: yes      Patient identity confirmed:  Verbally with patient, arm band, provided demographic data and hospital-assigned identification number  Pre-procedure details:     Hand hygiene: Hand hygiene performed prior to insertion      Sterile barrier technique: All elements of maximal sterile technique followed      Skin preparation:  ChloraPrep    Skin preparation agent: Skin preparation agent completely dried prior to procedure    Procedure details:     Complex Venous Access Line Type: PICC      Complex Venous Access Line Indications: chemotherapy      Catheter tip vessel location: atriocaval junction      Orientation:  Left    Location:  Brachial    Procedural supplies:  Double lumen    Catheter size:  5 Fr ([id number] ex 4-30-26)    Total catheter length (cm):  41    Catheter out on skin (cm):  1    Max flow rate:  999    Arm circumference:  29    Patient evaluated for contraindications to access (i.e. fistula, thrombosis, etc): Yes      Site selection rationale:  Largest most available vein    Approach: percutaneous technique used      Patient position:  Flat    Ultrasound image availability:  Not saved    Sterile ultrasound techniques: Sterile gel and sterile probe covers were used      Number of attempts:  2    Successful placement: yes      Landmarks  identified: yes      Vessel of catheter tip end:  Sherlock 3CG confirmed  Anesthesia (see MAR for exact dosages):     Anesthesia method:  Local infiltration    Local anesthetic:  Lidocaine 1% w/o epi  Post-procedure details:     Post-procedure:  Dressing applied    Assessment:  Blood return through all ports and free fluid flow    Post-procedure complications: none      Patient tolerance of procedure:  Tolerated well, no immediate complications

## 2025-06-30 NOTE — CASE MANAGEMENT
Case Management Assessment & Discharge Planning Note    Patient name Alexis Dennison  Location [facility] MRN [id number]  : 1960 Date 2025       Current Admission Date: 2025  Current Admission Diagnosis:Primary CNS lymphoma   Patient Active Problem List    Diagnosis Date Noted    Dental infection 2025    UTI (urinary tract infection) 2025    Chemotherapy induced neutropenia (HCC) 2025    Ankle edema, bilateral 2025    SIRS (systemic inflammatory response syndrome) (HCC) 2025    Anemia 05/15/2025    Thrombocytopenia (HCC) 2025    Hyponatremia 05/10/2025    Impaired mobility and activities of daily living 2025    HTN (hypertension) 2025    Transaminitis 2025    Leukopenia 2025    At risk for acid-base imbalance 2025    Electrolyte imbalance risk 2025    Recurrent E. coli bacteremia 2025    Metabolic alkalosis 2025    LETY (acute kidney injury) (HCC) 2025    Goals of care, counseling/discussion 2025    Palliative care encounter 2025    Primary CNS lymphoma 2025    Encephalopathy 2025    Ambulatory dysfunction 2025    Thyroid nodule 2025    Pulmonary nodule 2025    Liver lesion 2025    Primary central nervous system (CNS) lymphoma 2025    Type 2 diabetes mellitus with hyperglycemia, without long-term current use of insulin (HCC) 2022    HTN, goal below 130/80 2022    Mixed hyperlipidemia 2022    Vitamin D deficiency 2022    NAFLD (nonalcoholic fatty liver disease) 2022      LOS (days): 0  Geometric Mean LOS (GMLOS) (days):   Days to GMLOS:     OBJECTIVE:  PATIENT READMITTED TO HOSPITAL            Current admission status: Inpatient       Preferred Pharmacy:   [facility] #[id number] - [place] - [address] [address]  [address] [address]  Phone: [phone] Fax: [phone]    [facility] - [place], [place] - [address]  [address]  [facility] [address]  Phone: [phone] Fax: [phone]    [facility] - [place], [place] - [address]  [address]  [address]  [place] [address]  Phone: [phone] Fax: [phone]    [facility] - [place], [place] - [address]  [address] [facility] [address]  Phone: [phone] Fax: [phone]    [facility] ([facility]) #[id number] [place], [place] - [address] [address]  Phone: [phone] Fax: [phone]    [facility] - [place] - [address]  [address]  [place] [address]  Phone: [phone] Fax: [phone]    Primary Care Provider: PATI Mckeon    Primary Insurance: BLUE CROSS  Secondary Insurance: CAPITAL    ASSESSMENT:  Active Health Care Proxies       [facility] Representative - Daughter   Primary Phone: [phone] (Home)                           Readmission Root Cause  30 Day Readmission: Yes  During your hospital stay, did someone (provider, nurse, ) explain your care to you in a way you could understand?: Yes  Did you feel medically stable to leave the hospital?: Yes  Were you able to pay for your medication at the pharmacy?: Yes  Did you have reliable transportation to take you to your appointments?: Yes  During previous admission, was a post-acute recommendation made?: No  Patient was readmitted due to: Primary CNS lymphoma  Action Plan: Chemo    Patient Information  Admitted from:: Home  Mental Status: Alert  During Assessment patient was accompanied by: Not accompanied during assessment  Assessment information provided by:: Patient  Primary Caregiver: Self  Support Systems: Self, Spouse/significant other  County of Residence: [place]  What city do you live in?:  [place]  Home entry access options. Select all that apply.: Stairs  Number of steps to enter home.: 3  Type of Current Residence: 2 story home  Upon entering residence, is there a bedroom on the main floor (no further steps)?: No  A bedroom is located on the following floor levels of residence (select all that apply):: 2nd Floor  Upon entering residence, is there a bathroom on the main floor (no further steps)?: Yes  Number of steps to 2nd floor from main floor: One Flight  Living Arrangements: Lives w/ Spouse/significant other  Is patient a ?: No    Activities of Daily Living Prior to Admission  Functional Status: Independent  Completes ADLs independently?: Yes  Ambulates independently?: Yes  Does patient use assisted devices?: No  Does patient currently own DME?: No  Does patient have a history of Outpatient Therapy (PT/OT)?: No  Does the patient have a history of Short-Term Rehab?: No  Does patient have a history of HHC?: No  Does patient currently have HHC?: No         Patient Information Continued  Income Source: SSI/SSD  Does patient have prescription coverage?: Yes  Can the patient afford their medications and any related supplies (such as glucometers or test strips)?: Yes  Does patient receive dialysis treatments?: No  Does patient have a history of substance abuse?: No  Does patient have a history of Mental Health Diagnosis?: No         Means of Transportation  Means of Transport to Appts:: Family transport          DISCHARGE DETAILS:    Discharge planning discussed with:: Patient  Freedom of Choice: Yes  Comments - Freedom of Choice: Discussed FOC  CM contacted family/caregiver?: No- see comments (declined)  Were Treatment Team discharge recommendations reviewed with patient/caregiver?: Yes  Did patient/caregiver verbalize understanding of patient care needs?: Yes  Were patient/caregiver advised of the risks associated with not following Treatment Team discharge recommendations?: Yes      Treatment Team Recommendation: Home  Expected Discharge Disposition: Home or Self Care  Additional Discharge Dispositions: Home or Self Care  Transport at Discharge : Family            Additional Comments: CM student met with patient at bedside to review assessment and confirm whether any information had changed or remained the same since the previous evaluation. Patient is here to Chemo. Patient lives with his wife in a 2-story home and is independent at baseline with all ADLs and does not use any DME for ambulation. CM will remain available for discharge needs.

## 2025-06-30 NOTE — ASSESSMENT & PLAN NOTE
Normocytic  Baseline hemoglobin 8-10  Hemoglobin admission 8.5  No recent iron profile, will order  Continue to monitor and transfuse less than 7

## 2025-06-30 NOTE — ASSESSMENT & PLAN NOTE
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

## 2025-06-30 NOTE — CONSULTS
Medical Oncology/Hematology Consult Note  Alexis Dennison, male, 64 y.o., 1960,  PPHP 901/[facility] 901-01, [phone]     Assessment and Plan  1. Primary central nervous system (CNS) lymphoma     DLBCL NOS, non-germinal center, Ki-67 60-70% , BCL6 rearrangement  Diagnosed in 4/ 2025. Established with Dr. Phan      On Q-14 days High-dose IV Methotrexate   C1 (4/18) 8 g/m²  C2 (5/2) 3 g/m², dose-reduced due to LETY, delayed x1 day due to E. coli bacteremia   C3 (5/21) 3 g/m²   C4 : 3 g/m² ; given on 6/17/25    S/p Rituximab x 6 doses 4/17/25 - 6/19/2025   On Temozolamide 90 mg a day , on days 7 - 11 (received last on 6/23 - 6/27)        PLAN   - here for high dose methotrexate cycle 5 day 1 , Rituximab on day 3 ; vitals, labs, exam and ROS reviewed, okay to proceed with treatment as planned   - daily CBC and CMP  - IV Sodium Bicarb + Q6H UA as per protocol, UA PH goal > 7.0   - MTX level 24H, 48H, etc from MTX start time ; goal before discharge < 0.05   - Q6H Leucovorin 24 hours post MTX   - on ppx allopurinol       _________________________________________    Outpatient follow up plan: with Dr. Phan    Communication with patient/family:  I did update the patient   Communication with team:  Did communicate with Dr. Huang, primary team.   I did review this patient with JARRED Willoughby  and he is in agreement with this plan.          _________________________________________________    Reason for consultation: planned chemo     History of present illness:    Alexis Dennison is a 64 y.o. male with primary CNS (Dx 4/14/2025, double expressor (BCL2+/MYC+), DLBCL NOS, non-germinal center, Ki-67 60-70, BCL6 rearrangement), DM2 with baseline b/l LE neuropathy, NAFLD, and HTN with his treatment course of high-dose MTX + rituximab + temozolomide patient is presenting today for inpatient planned chemotherapy as outlined above .     Patient was seen and examined today, labs reviewed, ROS x 12 negative, exam  unremarkable.     Review of Systems:    Review of Systems  - GENERAL: Negative for any nausea, vomiting, fevers, chills, or weight loss.  - HEENT: Negative for any head/Neck trauma, pain, double/blurry vision, sinusitis, rhinitis, nose bleeding.  - CARDIAC: Negative for any chest pain, palpitation, Dyspnea on exertion, peripheral edema.  - PULMONARY: Negative for any SOB, cough, wheezing.   - GASTROINTESTINAL: Negative for any abdominal pain, N/V/D/C, blood in stool.   - GENITOURINARY: Negative for any dysuria, hematuria, incontinence.  - NEUROLOGIC: Negative for any muscle weakness, numbness/tingling, memory changes.    - MUSCULOSKELETAL: Negative for any joint pains/swelling, limited ROM.   - INTEGUMENTARY: Negative for any rashes, cuts/ lesions.  - HEMATOLOGIC: Negative for any abnormal bruising, frequent infections or bleeding.    Oncology History:   Cancer Staging   No matching staging information was found for the patient.    Oncology History   Primary CNS lymphoma   4/16/2025 Initial Diagnosis    Primary CNS lymphoma     4/17/2025 -  Chemotherapy    leucovorin (WELLCOVORIN), 25 mg, 4 of 8 cycles  Administration: 25 mg (4/19/2025), 25 mg (4/19/2025), 25 mg (4/20/2025), 25 mg (4/20/2025), 25 mg (4/20/2025), 25 mg (4/21/2025), 25 mg (5/23/2025), 25 mg (5/24/2025), 25 mg (5/24/2025), 25 mg (5/24/2025), 25 mg (5/24/2025), 25 mg (5/25/2025), 25 mg (5/25/2025), 25 mg (6/20/2025), 25 mg (6/20/2025), 25 mg (6/20/2025), 25 mg (6/20/2025), 25 mg (6/21/2025), 25 mg (6/21/2025), 25 mg (6/21/2025), 25 mg (6/21/2025), 25 mg (6/22/2025), 25 mg (6/22/2025), 25 mg (6/22/2025)  riTUXimab (RITUXAN) subsequent titrated chemo infusion, 765 mg (100 % of original dose 375 mg/m2), 1 of 1 cycle  Dose modification: 375 mg/m2 (original dose 375 mg/m2, Cycle 4)  Administration: 800 mg (6/19/2025)  leucovorin calcium IVPB, 25 mg, 4 of 8 cycles  Administration: 25 mg (5/3/2025), 25 mg (5/3/2025), 25 mg (5/4/2025), 25 mg (5/4/2025), 25 mg  "(5/4/2025), 25 mg (5/4/2025), 25 mg (6/18/2025), 25 mg (6/18/2025), 25 mg (6/19/2025), 25 mg (6/19/2025), 25 mg (6/19/2025), 25 mg (6/19/2025), 25 mg (5/22/2025), 25 mg (5/23/2025), 25 mg (5/23/2025), 25 mg (5/23/2025), 25 mg (5/24/2025)  methotrexate (PF) IVPB, 16,320 mg, 4 of 8 cycles  Dose modification: 8,000 mg/m2 (original dose 3,500 mg/m2, Cycle 2, Reason: Other (Must fill in a comment), Comment: Patient with aggressive PCNSL), 3,000 mg/m2 (original dose 3,500 mg/m2, Cycle 2, Reason: Other (Must fill in a comment), Comment: Elevated SCr)  Administration: 6,000 mg (5/2/2025), 6,000 mg (5/21/2025), 16,000 mg (4/18/2025), 6,000 mg (6/17/2025)  riTUXimab (RITUXAN) first titrated chemo infusion, 765 mg (100 % of original dose 375 mg/m2), 3 of 3 cycles  Dose modification: 375 mg/m2 (original dose 375 mg/m2, Cycle 1)  Administration: 800 mg (4/17/2025), 700 mg (4/24/2025), 800 mg (5/3/2025), 800 mg (5/10/2025), 800 mg (5/23/2025)  riTUXimab-ABBS (TRUXIMA) first titrated infusion, 375 mg/m2 = 765 mg, 1 of 1 cycle         Past Medical History:   Past Medical History[1]    Past Surgical History[2]    Family History[3]    Social History[4]    Current Medications[5]      Medications Prior to Admission:     acetaminophen (TYLENOL) 325 mg tablet    allopurinol (ZYLOPRIM) 300 mg tablet    atorvastatin (LIPITOR) 20 mg tablet    docusate sodium (COLACE) 100 mg capsule    leucovorin (WELLCOVORIN) 25 mg tablet    omeprazole (PriLOSEC) 20 mg delayed release capsule    ondansetron (ZOFRAN) 8 mg tablet    QUEtiapine (SEROquel) 25 mg tablet    senna (SENOKOT) 8.6 mg    SITaglip Phos-metFORMIN HCl ER (Janumet XR)  MG TB24    sodium bicarbonate 650 mg tablet    temozolomide (TEMODAR) 20 mg capsule    temozolomide (TEMODAR) 250 MG capsule    Allergies[6]      Physical Exam:     Ht 5' 10\" (1.778 m)   Wt 81.1 kg (178 lb 12.7 oz)   BMI 25.65 kg/m²     Physical Exam  - GEN: Appears well, alert and oriented x 3, pleasant and " cooperative, in no acute distress  - HEENT: Anicteric, mucous membranes moist, PERRL and EOMI   - NECK: No lymphadenopathy, JVD or carotid bruits   - HEART: RRR, normal S1 and S2, no murmurs, clicks, gallops or rubs   - LUNGS: Clear to auscultation bilaterally; no wheezes, rales, or rhonchi  - ABDOMEN: Normal bowel sounds, soft, no tenderness, no distention, no organomegaly or masses felt on exam.   - EXTREMITIES: Peripheral pulses normal; no clubbing, cyanosis, or edema  - NEURO: No focal findings, CN II-XII are grossly intact.   - Musculoskeletal: 5/5 strength, normal ROM, no swollen or erythematous joints.   - SKIN: Normal without suspicious lesions on exposed skin      Recent Results (from the past 48 hours)   UA (URINE) with reflex to Scope    Collection Time: 06/30/25  9:28 AM   Result Value Ref Range    Color, UA Light Yellow     Clarity, UA Clear     Specific Gravity, UA 1.015 1.003 - 1.030    pH, UA 7.5 4.5, 5.0, 5.5, 6.0, 6.5, 7.0, 7.5, 8.0    Leukocytes, UA Negative Negative    Nitrite, UA Negative Negative    Protein, UA Negative Negative mg/dl    Glucose,  (3/10%) (A) Negative mg/dl    Ketones, UA Negative Negative mg/dl    Urobilinogen, UA <2.0 <2.0 mg/dl mg/dl    Bilirubin, UA Negative Negative    Occult Blood, UA Negative Negative       MRI Brain BT w wo Contrast  Result Date: 6/26/2025  Narrative: MRI BRAIN WITH AND WITHOUT CONTRAST INDICATION: C83.390: Primary central nervous system lymphoma. COMPARISON: CT dated 4/1/2025. MRI dated 4/19/2025. TECHNIQUE: Multiplanar, multisequence imaging of the brain was performed before and after gadolinium administration. Axial postcontrast bravo sequence was inadvertently not performed. IV Contrast:  10 mL of Gadobutrol injection (SINGLE-DOSE) IMAGE QUALITY:   Diagnostic. FINDINGS: BRAIN PARENCHYMA: Redemonstration of right frontal ventriculostomy tract with focal encephalomalacia, gliosis and hemosiderin deposition. There is associated T1 hyperintensity  along the tract with minimal smooth along the proximal tract. Left basal ganglia mass has improved region of T1 shortening in the left basal ganglia measuring up to 2.1 cm. No definite residual enhancement but evaluation is limited due to T1 shortening. Improved vasogenic edema with a small residual focus of FLAIR hyperintensity that may represent gliosis rather than residual edema. No residual mass effect. Leptomeningeal and ependymal nodules and enhancement have resolved including the previously seen 1 cm nodule within the left lateral ventricle near the foramen of De Oliveira. No residual enhancement of the subcentimeter lesion in the left posterior centrum semiovale. No new lesions or abnormal enhancement. No increased blood flow on perfusion weighted imaging. Few small scattered foci of T2/FLAIR hyperintensity in the periventricular and subcortical matter likely due to mild chronic microangiopathy. No restricted diffusion. No acute hemorrhage, mass effect, shift or herniation. VENTRICLES: Ventricles are stable in size. Stable mild asymmetry of the left lateral ventricle. Intraventricular/subependymal hemosiderin deposition. SELLA AND PITUITARY GLAND: Tiny cystic lesion in the posterior pituitary gland is better visualized on prior study. Otherwise unremarkable. ORBITS:  Normal. PARANASAL SINUSES: Mild maxillary sinus mucosal thickening. VASCULATURE:  Evaluation of the major intracranial vasculature demonstrates appropriate flow voids. CALVARIUM AND SKULL BASE:  Normal. EXTRACRANIAL SOFT TISSUES:  Normal.     Impression: Response to therapy. T1 hyperintense left basal ganglia lesion is decreased in size. No definite residual enhancement but evaluation is limited due to T1 shortening. Surrounding vasogenic edema and mass effect improved. Leptomeningeal/ependymal disease and small enhancing lesion in the left parietal lobe have resolved. Workstation performed: [id number]     NM PET CT skull base to mid thigh  Result  Date: 6/16/2025  Narrative: PET/CT SCAN INDICATION: C83.390: Primary central nervous system lymphoma, restaging MODIFIER: PS COMPARISON: CT of abdomen and pelvis dated 5/18/2025, MRI of the brain, cervical spine, thoracic spine, and lumbar spine dated 4/19/25, CT angiography of the chest, abdomen, and pelvis dated 3/29/2025 CELL TYPE: Involved by pt's large B-cell lymphoma (bx 4/13/25 CSF +) TECHNIQUE:   8.2 mCi F-18-FDG administered IV. Multiplanar attenuation corrected and non attenuation corrected PET images are available for interpretation, and contiguous, low dose, axial CT sections were obtained from the skull vertex through the femurs. Intravenous contrast material was not utilized. This examination, like all CT scans performed in the [facility], was performed utilizing techniques to minimize radiation dose exposure, including the use of iterative reconstruction and automated exposure control. Fasting serum glucose: 115 mg/dl FINDINGS: VISUALIZED BRAIN: On PET image 37 there is subtle asymmetric focal activity involving the left cerebral hemisphere slightly posterior to the left frontal horn which is difficult to correlate on low-dose unenhanced CT but measures approximately 1.5 cm on PET imaging with max SUV of 7.7; this finding is in the expected location patient's known enhancing intracranial mass which is better characterized on MRI of the brain dated 4/20/25 and could reflect mild hypermetabolic malignant activity versus asymmetric activity involving the left basal ganglia which is in this region. Patient's known intracranial malignancy is better characterized on MRI of the brain given background physiologic brain activity and continued follow-up with MRI of the brain is recommended. HEAD/NECK: On image 20 there is mild FDG activity associated with cutaneous/subcutaneous soft tissue density involving the left frontal scalp which appears superficial to a siomara hole in the left frontal  calvarium extending from images 21 through 24 of series 3. On image 20 of series 3 this finding measures approximately 1.3 cm in length with max SUV of 3.9 and possibly reflects inflammatory activity although correlation is recommended to exclude the possibility of cutaneous/subcutaneous malignancy of low metabolic  activity. CT images: Essentially stable prominence to the lateral ventricles which is better characterized on prior dedicated MRI of the brain. Stable asymmetric nodular enlargement of the left thyroid gland which was better characterized on thyroid ultrasound dated 6//25 with recommendation for tissue sampling was made. On image 93 of series 3 there is a nonspecific mildly prominent common nonpathologic in size left perithyroidal/lower cervical lymph node measuring 6 mm in short axis. CHEST: No FDG avid soft tissue lesions are seen. CT images: Trace gynecomastia. Atherosclerotic vascular calcifications including those of the coronary arteries are noted. Tiny calcified right middle lobe granuloma. On image 129 of series 3 there is a new 6 mm left lower lobe lung nodule, possibly related to adjacent subtle patchy likely inflammatory groundglass airspace disease although given known malignancy, short interval follow-up with CT of chest in 3 months is recommended to ensure stability. ABDOMEN: Fairly diffuse nonspecific nonuniform bowel activity, possibly physiologic which limits evaluation for underlying hypermetabolic bowel malignancy. CT images: Stable right hepatic hypodensity which was better characterized on prior MRI of abdomen where it was characterized as a cyst. Atherosclerotic vascular calcifications are noted. PELVIS: No FDG avid soft tissue lesions are seen. CT images: Enlarged prostate gland. Mild nonspecific diffuse mural thickening of the urinary bladder wall which is difficult to evaluate secondary to under distention. OSSEOUS STRUCTURES: No FDG avid lesions are seen. CT images: No  significant findings.     Impression: 1.  Subtle asymmetric focal FDG activity involving the left cerebral hemisphere in the expected location of patient's known enhancing intracranial mass which could reflect mild hypermetabolic malignancy versus asymmetric physiologic activity involving the left basal ganglia. Patient's known intracranial malignancy is better characterized on MRI of the brain and continued follow-up with MRI of the brain is recommended as clinically indicated. 2.  Mild nonspecific FDG activity associated with cutaneous/subcutaneous soft tissue density involving the left frontal scalp which appears superficial to a siomara hole in the left frontal calvarium, possibly reflecting inflammatory activity. Correlation with direct visualization is recommended to exclude the possibility of cutaneous/subcutaneous malignancy of low metabolic activity. 3.  New 6 mm left lower lobe lung nodule, possibly inflammatory. Short interval follow-up with CT of chest in 3 months is recommended to ensure stability. 4.  Please see above for details and additional findings. The study was marked in EPIC for significant notification. The study was marked in Epic for follow-up. Workstation performed: [id number]     US thyroid  Result Date: 6/11/2025  Narrative: THYROID ULTRASOUND INDICATION: E04.1: Nontoxic single thyroid nodule. COMPARISON: CTA head and neck 3/24/2025 TECHNIQUE: Ultrasound of the thyroid was performed with a high frequency linear transducer in transverse and sagittal planes including volumetric imaging sweeps as well as traditional still imaging technique. FINDINGS: Thyroid texture: Thyroid is mildly inhomogeneous and hypervascular. Right lobe: 5.0 x 1.5 x 1.9 cm. Volume 6.9 mL Left lobe: 6.0 x 3.1 x 2.8 cm. Volume 24.9 mL Isthmus: 0.3 cm. Nodule #1. Image 33. LEFT anterior mid to lower pole nodule. The length in depth is difficult to accurately determine as it is abutting an additional posterior nodule, best  estimate measuring 3.1 x 1.4 x 2.1 cm (series 200 image 105 of 200). No prior ultrasound. COMPOSITION: 2 points, solid or almost completely solid. ECHOGENICITY: 1 point, hyperechoic or isoechoic. SHAPE: 0 points, wider-than-tall. MARGIN: 0 points, ill-defined. ECHOGENIC FOCI: 0 points, none or large comet-tail artifacts. TI-RADS Classification: TR 3 (3 points), FNA if >/= 2.5 cm. Follow if >/= 1.5 cm. Nodule #2. Image 36. LEFT posterior midgland nodule measuring 2.3 x 1.5 x 2.2 cm. No prior ultrasound. COMPOSITION: 2 points, solid or almost completely solid. ECHOGENICITY: 1 point, hyperechoic or isoechoic. SHAPE: 0 points, wider-than-tall. MARGIN: 0 points, smooth. ECHOGENIC FOCI: 0 points, none or large comet-tail artifacts. TI-RADS Classification: TR 3 (3 points), FNA if >/= 2.5 cm. Follow if >/= 1.5 cm. There are additional nodules of lesser size and/or TI-RADS score. These do not necessitate additional evaluation based on ACR criteria.     Impression: Asymmetric left thyroid enlargement with associated nodules corresponding to previous CT findings. The following meet current ACR criteria for recommending ultrasound guided biopsy: 1. LEFT anterior mid to lower pole 3.1 cm nodule (Image 33). 2. LEFT posterior midgland nodule measuring 2.3 cm (Image 36). Reference: ACR Thyroid Imaging, Reporting and Data System (TI-RADS): White Paper of the ACR TI-RADS Committee. J AM Winifred Radiol 2017;14:587-595. Additional recommendations based on American Thyroid Association 2015 guidelines. The study was marked in EPIC for significant notification and follow-up. Workstation performed: [id number]       Labs and pertinent reports reviewed.      This note has been generated by voice recognition software system.  Therefore, there may be spelling, grammar, and or syntax errors. Please contact if questions arise.    Simba Phillips,    Hematology and Medical Oncology - PGY V  [facility]            [1]   Past  Medical History:  Diagnosis Date    Colon polyp     Diabetes mellitus (HCC)     GERD (gastroesophageal reflux disease)     Hyperlipidemia     Hypertension     Liver disease     Sleep apnea    [2]   Past Surgical History:  Procedure Laterality Date    COLONOSCOPY      CYST REMOVAL      back    FL LUMBAR PUNCTURE DIAGNOSTIC  3/31/2025    FL LUMBAR PUNCTURE DIAGNOSTIC  4/7/2025    IA EXC B9 LESION MRGN XCP SK TG T/A/L 0.6-1.0 CM N/A 6/7/2023    Procedure: EXCISION  BIOPSY LESION/MASS ABDOMINAL/CHEST WALL;  Surgeon: Trevor Villavicencio MD;  Location: [facility]IN [facility];  Service: General    THIRD VENTRICULOSTOMY Left 4/14/2025    Procedure: Left frontal endoscopic biopsy of ventricular mass and placement of EVD;  Surgeon: Paul Causey MD;  Location: [facility];  Service: Neurosurgery   [3]   Family History  Problem Relation Name Age of Onset    Cancer Mother Kellen Renee     Cancer Father Amrit Dennison     Diabetes Paternal Grandfather Shailesh Calixber     Diabetes unspecified Paternal Grandfather Shailesh Calixber         Type 2    Colon polyps Neg Hx      Colon cancer Neg Hx     [4]   Social History  Socioeconomic History    Marital status: /Civil Union   Tobacco Use    Smoking status: Never     Passive exposure: Never    Smokeless tobacco: Never    Tobacco comments:     N/a   Vaping Use    Vaping status: Never Used   Substance and Sexual Activity    Alcohol use: Yes     Alcohol/week: 1.0 standard drink of alcohol     Types: 1 Cans of beer per week     Comment: socially    Drug use: Never    Sexual activity: Not Currently     Partners: Female     Social Drivers of Health     Food Insecurity: Patient Unable To Answer (5/28/2025)    Nursing - Inadequate Food Risk Classification     Ran Out of Food in the Last Year: Patient unable to answer   Transportation Needs: No Transportation Needs (6/12/2025)    OASIS : Transportation     Lack of Transportation (Medical): No     Lack of Transportation (Non-Medical): No     Patient Unable or  Declines to Respond: No   Intimate Partner Violence: Patient Unable To Answer (5/28/2025)    Nursing IPS     Physically Hurt by Someone: Patient unable to answer     Hurt or Threatened by Someone: Patient unable to answer   Housing Stability: Patient Unable To Answer (5/28/2025)    Nursing: Inadequate Housing Risk Classification     Unable to Pay for Housing in the Last Year: Patient unable to answer     Has Housing: Patient unable to answer   [5]   Current Facility-Administered Medications:     allopurinol (ZYLOPRIM) tablet 300 mg, 300 mg, Oral, Daily, Dionne Huang MD    atorvastatin (LIPITOR) tablet 20 mg, 20 mg, Oral, Daily, Dionne Huang MD    docusate sodium (COLACE) capsule 100 mg, 100 mg, Oral, BID, Dionne Huang MD    heparin (porcine) subcutaneous injection 5,000 Units, 5,000 Units, Subcutaneous, Q8H CAIT, Dionne Huang MD    ondansetron (ZOFRAN-ODT) dispersible tablet 4 mg, 4 mg, Oral, Q8H PRN, Dionne Huang MD    pantoprazole (PROTONIX) EC tablet 40 mg, 40 mg, Oral, Early Morning, Dionne Huang MD    QUEtiapine (SEROquel) tablet 12.5 mg, 12.5 mg, Oral, BID, Dionne Huang MD    senna (SENOKOT) tablet 17.2 mg, 17.2 mg, Oral, Daily With Lunch, Dionne Huang MD    sodium bicarbonate 100 mEq in dextrose 5 % 1,000 mL infusion, 150 mL/hr, Intravenous, Continuous, Leta Phan MD  [6] No Known Allergies

## 2025-07-01 ENCOUNTER — APPOINTMENT (OUTPATIENT)
Facility: CLINIC | Age: 65
End: 2025-07-01
Payer: COMMERCIAL

## 2025-07-01 ENCOUNTER — APPOINTMENT (OUTPATIENT)
Dept: SPEECH THERAPY | Facility: CLINIC | Age: 65
End: 2025-07-01
Payer: COMMERCIAL

## 2025-07-01 LAB
ANION GAP SERPL CALCULATED.3IONS-SCNC: 7 MMOL/L (ref 4–13)
BACTERIA UR QL AUTO: ABNORMAL /HPF
BACTERIA UR QL AUTO: NORMAL /HPF
BILIRUB UR QL STRIP: NEGATIVE
BUN SERPL-MCNC: 17 MG/DL (ref 5–25)
CALCIUM SERPL-MCNC: 8.3 MG/DL (ref 8.4–10.2)
CHLORIDE SERPL-SCNC: 102 MMOL/L (ref 96–108)
CLARITY UR: CLEAR
CO2 SERPL-SCNC: 30 MMOL/L (ref 21–32)
COLOR UR: ABNORMAL
COLOR UR: COLORLESS
CREAT SERPL-MCNC: 0.99 MG/DL (ref 0.6–1.3)
ERYTHROCYTE [DISTWIDTH] IN BLOOD BY AUTOMATED COUNT: 16.3 % (ref 11.6–15.1)
FERRITIN SERPL-MCNC: 436 NG/ML (ref 30–336)
GFR SERPL CREATININE-BSD FRML MDRD: 79 ML/MIN/1.73SQ M
GLUCOSE SERPL-MCNC: 167 MG/DL (ref 65–140)
GLUCOSE SERPL-MCNC: 177 MG/DL (ref 65–140)
GLUCOSE SERPL-MCNC: 180 MG/DL (ref 65–140)
GLUCOSE SERPL-MCNC: 206 MG/DL (ref 65–140)
GLUCOSE SERPL-MCNC: 240 MG/DL (ref 65–140)
GLUCOSE UR STRIP-MCNC: ABNORMAL MG/DL
GLUCOSE UR STRIP-MCNC: NEGATIVE MG/DL
HCT VFR BLD AUTO: 24.2 % (ref 36.5–49.3)
HGB BLD-MCNC: 8.4 G/DL (ref 12–17)
HGB UR QL STRIP.AUTO: ABNORMAL
IRON SATN MFR SERPL: 24 % (ref 15–50)
IRON SERPL-MCNC: 60 UG/DL (ref 50–212)
KETONES UR STRIP-MCNC: NEGATIVE MG/DL
LEUKOCYTE ESTERASE UR QL STRIP: ABNORMAL
LEUKOCYTE ESTERASE UR QL STRIP: ABNORMAL
LEUKOCYTE ESTERASE UR QL STRIP: NEGATIVE
LEUKOCYTE ESTERASE UR QL STRIP: NEGATIVE
MCH RBC QN AUTO: 31.5 PG (ref 26.8–34.3)
MCHC RBC AUTO-ENTMCNC: 34.7 G/DL (ref 31.4–37.4)
MCV RBC AUTO: 91 FL (ref 82–98)
NITRITE UR QL STRIP: NEGATIVE
NON-SQ EPI CELLS URNS QL MICRO: ABNORMAL /HPF
NON-SQ EPI CELLS URNS QL MICRO: NORMAL /HPF
PH UR STRIP.AUTO: 8 [PH]
PH UR STRIP.AUTO: 8.5 [PH]
PLATELET # BLD AUTO: 151 THOUSANDS/UL (ref 149–390)
PMV BLD AUTO: 10.7 FL (ref 8.9–12.7)
POTASSIUM SERPL-SCNC: 3.8 MMOL/L (ref 3.5–5.3)
PROT UR STRIP-MCNC: ABNORMAL MG/DL
PROT UR STRIP-MCNC: NEGATIVE MG/DL
RBC # BLD AUTO: 2.67 MILLION/UL (ref 3.88–5.62)
RBC #/AREA URNS AUTO: ABNORMAL /HPF
RBC #/AREA URNS AUTO: NORMAL /HPF
SODIUM SERPL-SCNC: 139 MMOL/L (ref 135–147)
SP GR UR STRIP.AUTO: 1 (ref 1–1.03)
SP GR UR STRIP.AUTO: 1 (ref 1–1.03)
SP GR UR STRIP.AUTO: 1.01 (ref 1–1.03)
SP GR UR STRIP.AUTO: 1.01 (ref 1–1.03)
TIBC SERPL-MCNC: 246.4 UG/DL (ref 250–450)
TRANSFERRIN SERPL-MCNC: 176 MG/DL (ref 203–362)
UIBC SERPL-MCNC: 186 UG/DL (ref 155–355)
UROBILINOGEN UR STRIP-ACNC: <2 MG/DL
WBC # BLD AUTO: 10.95 THOUSAND/UL (ref 4.31–10.16)
WBC #/AREA URNS AUTO: ABNORMAL /HPF
WBC #/AREA URNS AUTO: NORMAL /HPF

## 2025-07-01 PROCEDURE — 82728 ASSAY OF FERRITIN: CPT | Performed by: STUDENT IN AN ORGANIZED HEALTH CARE EDUCATION/TRAINING PROGRAM

## 2025-07-01 PROCEDURE — 80048 BASIC METABOLIC PNL TOTAL CA: CPT | Performed by: STUDENT IN AN ORGANIZED HEALTH CARE EDUCATION/TRAINING PROGRAM

## 2025-07-01 PROCEDURE — 97163 PT EVAL HIGH COMPLEX 45 MIN: CPT

## 2025-07-01 PROCEDURE — 83540 ASSAY OF IRON: CPT | Performed by: STUDENT IN AN ORGANIZED HEALTH CARE EDUCATION/TRAINING PROGRAM

## 2025-07-01 PROCEDURE — 85027 COMPLETE CBC AUTOMATED: CPT | Performed by: STUDENT IN AN ORGANIZED HEALTH CARE EDUCATION/TRAINING PROGRAM

## 2025-07-01 PROCEDURE — 82948 REAGENT STRIP/BLOOD GLUCOSE: CPT

## 2025-07-01 PROCEDURE — 99232 SBSQ HOSP IP/OBS MODERATE 35: CPT | Performed by: FAMILY MEDICINE

## 2025-07-01 PROCEDURE — 81001 URINALYSIS AUTO W/SCOPE: CPT | Performed by: INTERNAL MEDICINE

## 2025-07-01 PROCEDURE — 83550 IRON BINDING TEST: CPT | Performed by: STUDENT IN AN ORGANIZED HEALTH CARE EDUCATION/TRAINING PROGRAM

## 2025-07-01 PROCEDURE — 80204 DRUG ASSAY METHOTREXATE: CPT | Performed by: FAMILY MEDICINE

## 2025-07-01 PROCEDURE — 97166 OT EVAL MOD COMPLEX 45 MIN: CPT

## 2025-07-01 RX ADMIN — DEXTROSE MONOHYDRATE 25 MG: 50 INJECTION, SOLUTION INTRAVENOUS at 16:08

## 2025-07-01 RX ADMIN — SODIUM BICARBONATE 150 ML/HR: 84 INJECTION, SOLUTION INTRAVENOUS at 01:02

## 2025-07-01 RX ADMIN — QUETIAPINE FUMARATE 12.5 MG: 25 TABLET ORAL at 21:11

## 2025-07-01 RX ADMIN — PANTOPRAZOLE SODIUM 40 MG: 40 TABLET, DELAYED RELEASE ORAL at 06:33

## 2025-07-01 RX ADMIN — DOCUSATE SODIUM 100 MG: 100 CAPSULE, LIQUID FILLED ORAL at 09:06

## 2025-07-01 RX ADMIN — INSULIN LISPRO 1 UNITS: 100 INJECTION, SOLUTION INTRAVENOUS; SUBCUTANEOUS at 07:44

## 2025-07-01 RX ADMIN — SENNOSIDES 17.2 MG: 8.6 TABLET, FILM COATED ORAL at 11:59

## 2025-07-01 RX ADMIN — HEPARIN SODIUM 5000 UNITS: 5000 INJECTION INTRAVENOUS; SUBCUTANEOUS at 06:33

## 2025-07-01 RX ADMIN — INSULIN LISPRO 1 UNITS: 100 INJECTION, SOLUTION INTRAVENOUS; SUBCUTANEOUS at 17:05

## 2025-07-01 RX ADMIN — ATORVASTATIN CALCIUM 20 MG: 20 TABLET, FILM COATED ORAL at 09:06

## 2025-07-01 RX ADMIN — HEPARIN SODIUM 5000 UNITS: 5000 INJECTION INTRAVENOUS; SUBCUTANEOUS at 13:25

## 2025-07-01 RX ADMIN — DOCUSATE SODIUM 100 MG: 100 CAPSULE, LIQUID FILLED ORAL at 17:05

## 2025-07-01 RX ADMIN — HEPARIN SODIUM 5000 UNITS: 5000 INJECTION INTRAVENOUS; SUBCUTANEOUS at 21:11

## 2025-07-01 RX ADMIN — ALLOPURINOL 300 MG: 300 TABLET ORAL at 09:06

## 2025-07-01 RX ADMIN — INSULIN LISPRO 3 UNITS: 100 INJECTION, SOLUTION INTRAVENOUS; SUBCUTANEOUS at 12:00

## 2025-07-01 RX ADMIN — SODIUM BICARBONATE 150 ML/HR: 84 INJECTION, SOLUTION INTRAVENOUS at 09:22

## 2025-07-01 RX ADMIN — DEXTROSE MONOHYDRATE 25 MG: 50 INJECTION, SOLUTION INTRAVENOUS at 22:00

## 2025-07-01 RX ADMIN — SODIUM BICARBONATE 150 ML/HR: 84 INJECTION, SOLUTION INTRAVENOUS at 15:59

## 2025-07-01 RX ADMIN — QUETIAPINE FUMARATE 12.5 MG: 25 TABLET ORAL at 11:59

## 2025-07-01 NOTE — UTILIZATION REVIEW
NOTIFICATION OF INPATIENT ADMISSION      AUTHORIZATION REQUEST   SERVICING FACILITY:   [facility]  Address: [address] [place] [address]  Tax ID: [id number]  NPI: [id number] ATTENDING PROVIDER:  Attending Name and NPI#: Crispin Arana Md [[id number]]  Address: [address] [place] [address]  Phone: [phone]   ADMISSION INFORMATION:  Place of Service: Inpatient [facility]  Place of Service Code: 21  Inpatient Admission Date/Time: 6/30/25  8:18 AM  Discharge Date/Time: No discharge date for patient encounter.  Admitting Diagnosis Code/Description:  Primary central nervous system (CNS) lymphoma [C83.390]     UTILIZATION REVIEW CONTACT:  Rose Carrillo Utilization   Network Utilization Review Department  Phone: [phone]  Fax: [phone]  Email: [email or web address]  Contact for approvals/pending authorizations, clinical reviews, and discharge.     PHYSICIAN ADVISORY SERVICES:  Medical Necessity Denial & Gmzy-xd-Ubgg Review  Phone: [phone]  Fax: [phone]  Email: [email or web address]     DISCHARGE SUPPORT TEAM:  For Patients Discharge Needs & Updates  Phone: [phone] opt. 2 Fax: [phone]  Email: [email or web address]

## 2025-07-01 NOTE — PROGRESS NOTES
Progress Note - Hospitalist   Name: Alexis Dennison 64 y.o. 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, double expressor (BCL2+/MYC+), DLBCL NOS, non-germinal center, Ki-67 60-70, BCL6 rearrangement   Has been receiving inpatient high-dose methotrexate and rituximab in outpatient temozolomide 90 mg a day on day 7-11  Admitted for initiation of cycle 5  Further management per oncology  Virtual one-to-one for safety  Supportive care with as needed medications for nausea, vomiting, pain    Type 2 diabetes mellitus with hyperglycemia, without long-term current use of insulin ([facility])  Lab Results   Component Value Date    HGBA1C 5.5 06/16/2025       Recent Labs     06/30/25 2026 07/01/25  0727 07/01/25  1108 07/01/25  1559   POCGLU 394* 167* 240* 180*       Blood Sugar Average: Last 72 hrs:  (P) 227.6PTA Janumet, holding  Insulin sliding scale while inpatient  Hypoglycemia protocol    HTN (hypertension)  Not on antihypertensives PTA  BP reviewed and stable  Continue to monitor  Anemia  Normocytic  Baseline hemoglobin 8-10  Hemoglobin admission 8.5  No recent iron profile, will order  Continue to monitor and transfuse less than 7    VTE Pharmacologic Prophylaxis: VTE Score: 4 Moderate Risk (Score 3-4) - Pharmacological DVT Prophylaxis Ordered: heparin.    Mobility:   Basic Mobility Inpatient Raw Score: 18  JH-HLM Goal: 6: Walk 10 steps or more  JH-HLM Achieved: 7: Walk 25 feet or more  JH-HLM Goal achieved. Continue to encourage appropriate mobility.    Patient Centered Rounds: I performed bedside rounds with nursing staff today.   Discussions with Specialists or Other Care Team Provider: Hematology oncology    Education and Discussions with Family / Patient: Patient declined call to .     Current Length of Stay: 1 day(s)  Current Patient Status: Inpatient    Certification Statement: The patient will continue to require additional inpatient hospital stay due to primary CNS lymphoma  Discharge Plan: Anticipate discharge in 48-72 hrs to home.    Code Status: Level 1 - Full Code    Subjective   This is a very pleasant 64 y.o. male who was seen today at bedside. Patient has no new complaints. Patient is not in any acute distress.       Objective :  Temp:  [97.4 °F (36.3 °C)-98.1 °F (36.7 °C)] 97.7 °F (36.5 °C)  HR:  [66-78] 66  BP: (112-125)/(60-88) 116/62  Resp:  [16-18] 18  SpO2:  [95 %-98 %] 95 %  O2 Device: None (Room air)    Body mass index is 25.65 kg/m².     Input and Output Summary (last 24 hours):     Intake/Output Summary (Last 24 hours) at 7/1/2025 1625  Last data filed at 7/1/2025 1559  Gross per 24 hour   Intake 5854.46 ml   Output 4775 ml   Net 1079.46 ml       Physical Exam  Vitals reviewed.   HENT:      Head: Normocephalic.      Nose: No congestion.      Mouth/Throat:      Pharynx: No oropharyngeal exudate or posterior oropharyngeal erythema.     Eyes:      Conjunctiva/sclera: Conjunctivae normal.       Cardiovascular:      Rate and Rhythm: Normal rate and regular rhythm.   Pulmonary:      Effort: Pulmonary effort is normal.   Abdominal:      General: Abdomen is flat.      Palpations: Abdomen is soft.     Skin:     General: Skin is warm and dry.     Neurological:      Mental Status: He is alert and oriented to person, place, and time. Mental status is at baseline.           Lines/Drains:  Lines/Drains/Airways       Active Status       Name Placement date Placement time Site Days    PICC Line 06/30/25 Left Brachial 06/30/25  1005  Brachial  1    Urethral Catheter 16 Fr. 07/01/25  0925  --  less than 1                  Urinary Catheter:  Goal for removal: Voiding trial when ambulation improves         Central Line:  Goal for removal: Will discontinue when peripheral access obtained.                Lab Results: I have reviewed the following results:   Results  from last 7 days   Lab Units 07/01/25  0635 06/30/25  1036 06/26/25  0854   WBC Thousand/uL 10.95*   < > 8.75   HEMOGLOBIN g/dL 8.4*   < > 9.4*   HEMATOCRIT % 24.2*   < > 29.5*   PLATELETS Thousands/uL 151   < > 176   SEGS PCT %  --   --  61   LYMPHO PCT %  --   --  22   MONO PCT %  --   --  7   EOS PCT %  --   --  8*    < > = values in this interval not displayed.     Results from last 7 days   Lab Units 07/01/25  0635 06/30/25  1036   SODIUM mmol/L 139 140   POTASSIUM mmol/L 3.8 4.0   CHLORIDE mmol/L 102 104   CO2 mmol/L 30 31   BUN mg/dL 17 18   CREATININE mg/dL 0.99 0.94   ANION GAP mmol/L 7 5   CALCIUM mg/dL 8.3* 8.6   ALBUMIN g/dL  --  3.6   TOTAL BILIRUBIN mg/dL  --  0.30   ALK PHOS U/L  --  81   ALT U/L  --  13   AST U/L  --  11*   GLUCOSE RANDOM mg/dL 206* 161*         Results from last 7 days   Lab Units 07/01/25  1559 07/01/25  1108 07/01/25  0727 06/30/25  2026 06/30/25  1624   POC GLUCOSE mg/dl 180* 240* 167* 394* 157*               Recent Cultures (last 7 days):   Results from last 7 days   Lab Units 06/26/25  0854   URINE CULTURE  No Growth <1000 cfu/mL       Imaging Results Review: No pertinent imaging studies reviewed.  Other Study Results Review: No additional pertinent studies reviewed.    Last 24 Hours Medication List:     Current Facility-Administered Medications:     allopurinol (ZYLOPRIM) tablet 300 mg, Daily    alteplase (CATHFLO) injection 2 mg, Q1MIN PRN    alteplase (CATHFLO) injection 2 mg, Q1MIN PRN    atorvastatin (LIPITOR) tablet 20 mg, Daily    docusate sodium (COLACE) capsule 100 mg, BID    heparin (porcine) subcutaneous injection 5,000 Units, Q8H CAIT    insulin lispro (HumALOG/ADMELOG) 100 units/mL subcutaneous injection 1-6 Units, TID AC **AND** Fingerstick Glucose (POCT), TID AC    leucovorin 25 mg in dextrose 5 % 50 mL IVPB, Q6H, Last Rate: 25 mg (07/01/25 1608)    ondansetron (ZOFRAN-ODT) dispersible tablet 4 mg, Q8H PRN    pantoprazole (PROTONIX) EC tablet 40 mg, Early  Morning    QUEtiapine (SEROquel) tablet 12.5 mg, BID    senna (SENOKOT) tablet 17.2 mg, Daily With Lunch    sodium bicarbonate 100 mEq in dextrose 5 % 1,000 mL infusion, Continuous, Last Rate: 150 mL/hr (07/01/25 1559)    sodium chloride 0.9 % infusion, Once PRN, Last Rate: 20 mL/hr (06/30/25 1838)    Administrative Statements   Today, Patient Was Seen By: Crispin Arana MD  I have spent a total time of 40 minutes in caring for this patient on the day of the visit/encounter including Diagnostic results, Prognosis, Risks and benefits of tx options, Instructions for management, Patient and family education, Impressions, Documenting in the medical record, Reviewing/placing orders in the medical record (including tests, medications, and/or procedures), and Obtaining or reviewing history  .    **Please Note: This note may have been constructed using a voice recognition system.**

## 2025-07-01 NOTE — ASSESSMENT & PLAN NOTE
Lab Results   Component Value Date    HGBA1C 5.5 06/16/2025       Recent Labs     06/30/25 2026 07/01/25  0727 07/01/25  1108 07/01/25  1559   POCGLU 394* 167* 240* 180*       Blood Sugar Average: Last 72 hrs:  (P) 227.6PTA Janumet, holding  Insulin sliding scale while inpatient  Hypoglycemia protocol

## 2025-07-01 NOTE — UTILIZATION REVIEW
Initial Clinical Review    Admission: Date/Time/Statement:   Admission Orders (From admission, onward)       Ordered        06/30/25 0938  INPATIENT ADMISSION  Once                          Orders Placed This Encounter   Procedures    INPATIENT ADMISSION     Standing Status:   Standing     Number of Occurrences:   1     Level of Care:   Med Surg [16]     Estimated length of stay:   More than 2 Midnights     Certification:   I certify that inpatient services are medically necessary for this patient for a duration of greater than two midnights. See H&P and MD Progress Notes for additional information about the patient's course of treatment.       Initial Presentation: 64 y.o. male to the ED from home admitted for  initiation of CYcle 5 of chemo for CNS lymphoma. H/O recently diagnosed CNS lymphoma, type 2 diabetes, hyperlipidemia and hypertension .    Has been receiving inpatient high-dose methotrexate and rituximab in outpatient temozolomide 90 mg a day on day 7-11. Oncology consult. 1:1 for safety.      Med/oncology:  Start high dose methotrexate cycle 5 day 1. IV Sodium Bicarb + Q6H UA as per protocol, UA PH goal > 7.0   - MTX level 24H, 48H, etc from MTX start time ; goal before discharge < 0.05   - Q6H Leucovorin 24 hours post MTX   Anticipated Length of Stay/Certification Statement: Patient will be admitted on an inpatient basis with an anticipated length of stay of greater than 2 midnights secondary to chemotherapy.       Date: 7/1   Day 2:  Continue with high dose methotrexate. And IV fluids with bicarb          Scheduled Medications:  allopurinol, 300 mg, Oral, Daily  atorvastatin, 20 mg, Oral, Daily  docusate sodium, 100 mg, Oral, BID  heparin (porcine), 5,000 Units, Subcutaneous, Q8H CAIT  insulin lispro, 1-6 Units, Subcutaneous, TID AC  leucovorin 25 mg in dextrose 5 % 50 mL IVPB, 25 mg, Intravenous, Q6H  pantoprazole, 40 mg, Oral, Early Morning  QUEtiapine, 12.5 mg, Oral, BID  senna, 17.2 mg, Oral, Daily  With Lunch      Continuous IV Infusions:  sodium bicarbonate 100 mEq in dextrose 5 % 1,000 mL infusion, 150 mL/hr, Intravenous, Continuous      PRN Meds:  alteplase, 2 mg, Intracatheter, Q1MIN PRN  alteplase, 2 mg, Intracatheter, Q1MIN PRN  ondansetron, 4 mg, Oral, Q8H PRN  sodium chloride, 20 mL/hr, Intravenous, Once PRN      ED Triage Vitals   Temperature Pulse Respirations Blood Pressure SpO2 Pain Score   06/30/25 1050 06/30/25 1050 06/30/25 1050 06/30/25 1050 06/30/25 1050 06/30/25 1000   97.8 °F (36.6 °C) 76 18 118/72 98 % No Pain     Weight (last 2 days)       Date/Time Weight    06/30/25 0845 81.1 (178.79)            Vital Signs (last 3 days)       Date/Time Temp Pulse Resp BP MAP (mmHg) SpO2 O2 Device [place] Coma Scale Score Pain    07/01/25 07:29:54 97.8 °F (36.6 °C) 70 16 112/64 80 96 % -- -- --    07/01/25 02:22:32 97.4 °F (36.3 °C) 70 -- 114/60 -- 95 % -- -- --    06/30/25 21:52:14 97.4 °F (36.3 °C) 70 -- 114/64 81 97 % -- -- --    06/30/25 21:51:38 97.4 °F (36.3 °C) 70 -- 114/64 81 96 % -- -- --    06/30/25 2010 -- -- -- -- -- -- None (Room air) 14 No Pain    06/30/25 19:14:53 97.8 °F (36.6 °C) 76 -- 125/72 90 97 % -- -- --    06/30/25 19:14:34 97.8 °F (36.6 °C) 78 -- 125/72 90 96 % -- -- --    06/30/25 1700 -- -- -- -- -- -- None (Room air) 14 No Pain    06/30/25 14:56:59 97.7 °F (36.5 °C) 79 18 130/72 91 99 % -- -- --    06/30/25 10:50:51 97.8 °F (36.6 °C) 76 18 118/72 87 98 % -- -- --    06/30/25 1000 -- -- -- -- -- -- None (Room air) 14 No Pain            Pertinent Labs/Diagnostic Test Results:   Radiology:  No orders to display     Cardiology:  No orders to display     GI:  No orders to display           Results from last 7 days   Lab Units 07/01/25  0635 06/30/25  1036 06/26/25  0854   WBC Thousand/uL 10.95* 6.33 8.75   HEMOGLOBIN g/dL 8.4* 8.5* 9.4*   HEMATOCRIT % 24.2* 25.5* 29.5*   PLATELETS Thousands/uL 151 143* 176   TOTAL NEUT ABS Thousands/µL  --   --  5.37         Results from last 7 days    Lab Units 07/01/25  0635 06/30/25  1036 06/26/25  0854   SODIUM mmol/L 139 140 140   POTASSIUM mmol/L 3.8 4.0 4.3   CHLORIDE mmol/L 102 104 104   CO2 mmol/L 30 31 29   ANION GAP mmol/L 7 5 7   BUN mg/dL 17 18 23   CREATININE mg/dL 0.99 0.94 1.14   EGFR ml/min/1.73sq m 79 84 67   CALCIUM mg/dL 8.3* 8.6 9.5     Results from last 7 days   Lab Units 06/30/25  1036 06/26/25  0854   AST U/L 11* 12*   ALT U/L 13 23   ALK PHOS U/L 81 69   TOTAL PROTEIN g/dL 5.5* 6.3*   ALBUMIN g/dL 3.6 4.1   TOTAL BILIRUBIN mg/dL 0.30 0.58     Results from last 7 days   Lab Units 07/01/25  0727 06/30/25  2026 06/30/25  1624   POC GLUCOSE mg/dl 167* 394* 157*     Results from last 7 days   Lab Units 07/01/25  0635 06/30/25  1036 06/26/25  0854   GLUCOSE RANDOM mg/dL 206* 161* 95                   Results from last 7 days   Lab Units 07/01/25  0635   FERRITIN ng/mL 436*       Results from last 7 days   Lab Units 07/01/25  0918 07/01/25  0256 06/30/25 2002 06/30/25  0928 06/26/25  0854   CLARITY UA  Clear Clear Clear   < > Clear   COLOR UA  Colorless Light Yellow Yellow   < > Yellow   SPEC GRAV UA  1.005 1.011 1.011   < > 1.025   PH UA  8.0 8.5* 7.5   < > 5.5   GLUCOSE UA mg/dl 500 (1/2%)* >=1000 (1%)* >=1000 (1%)*   < > 30 (3/100%)*   KETONES UA mg/dl Negative Negative Negative   < > Negative   BLOOD UA  Moderate* Trace* Small*   < > Negative   PROTEIN UA mg/dl Negative 30 (1+)* 100 (2+)*   < > Trace*   NITRITE UA  Negative Negative Negative   < > Negative   BILIRUBIN UA  Negative Negative Negative   < > Negative   UROBILINOGEN UA (BE) mg/dl <2.0 <2.0 <2.0   < > <2.0   LEUKOCYTES UA  Trace* Elevated glucose may cause decreased leukocyte values. See urine microscopic for UWBC result* Elevated glucose may cause decreased leukocyte values. See urine microscopic for UWBC result*   < > Trace*   WBC UA /hpf  --  None Seen 2-4*  --  1-2*   RBC UA /hpf  --  20-30* 10-20*  --  None Seen   BACTERIA UA /hpf  --  None Seen None Seen  --  Occasional    EPITHELIAL CELLS WET PREP /hpf  --  None Seen None Seen  --  None Seen   MUCUS THREADS   --   --   --   --  Occasional*    < > = values in this interval not displayed.         Results from last 7 days   Lab Units 06/26/25  0854   URINE CULTURE  No Growth <1000 cfu/mL         Past Medical History[1]  Present on Admission:   Primary CNS lymphoma   Type 2 diabetes mellitus with hyperglycemia, without long-term current use of insulin (HCC)   HTN (hypertension)   Anemia      Admitting Diagnosis: Primary central nervous system (CNS) lymphoma [C83.390]  Age/Sex: 64 y.o. male    Network Utilization Review Department  ATTENTION: Please call with any questions or concerns to [phone] and carefully listen to the prompts so that you are directed to the right person. All voicemails are confidential.   For Discharge needs, contact Care Management DC Support Team at [phone] opt. 2  Send all requests for admission clinical reviews, approved or denied determinations and any other requests to dedicated fax number below belonging to the campus where the patient is receiving treatment. List of dedicated fax numbers for the Facilities:  FACILITY NAME UR FAX NUMBER   ADMISSION DENIALS (Administrative/Medical Necessity) [phone]   DISCHARGE SUPPORT TEAM (NETWORK) [phone]   PARENT CHILD HEALTH (Maternity/NICU/Pediatrics) [phone]   [facility] [phone]   [facility] [phone]   [facility] [phone]   [facility] [phone]   [facility] [phone]   [facility] [phone]   [facility] [phone]   [facility] [phone]   [facility] [phone]   [facility] [phone]   [facility] [phone]    [facility] [phone]              [1]   Past Medical History:  Diagnosis Date    Colon polyp     Diabetes mellitus (HCC)     GERD (gastroesophageal reflux disease)     Hyperlipidemia     Hypertension     Liver disease     Sleep apnea

## 2025-07-01 NOTE — OCCUPATIONAL THERAPY NOTE
Occupational Therapy Evaluation     Patient Name: Alexis Dennison  Today's Date: 7/1/2025  Problem List  Principal Problem:    Primary CNS lymphoma  Active Problems:    Type 2 diabetes mellitus with hyperglycemia, without long-term current use of insulin (HCC)    HTN (hypertension)    Anemia    Past Medical History  Past Medical History[1]  Past Surgical History  Past Surgical History[2]       OCCUPATIONAL THERAPY         07/01/25 0951   OT Last Visit   OT Visit Date 07/01/25   Note Type   Note type Evaluation   Pain Assessment   Pain Assessment Tool 0-10   Pain Rating: FLACC (Rest) - Face 1   Pain Rating: FLACC (Rest) - Legs 0   Pain Rating: FLACC (Rest) - Activity 0   Pain Rating: FLACC (Rest) - Cry 0   Pain Rating: FLACC (Rest) - Consolability 0   Score: FLACC (Rest) 1   Pain Rating: FLACC (Activity) - Face 1   Pain Rating: FLACC (Activity) - Legs 0   Pain Rating: FLACC (Activity) - Activity 0   Pain Rating: FLACC (Activity) - Cry 0   Pain Rating: FLACC (Activity) - Consolability 0   Score: FLACC (Activity) 1   Restrictions/Precautions   Weight Bearing Precautions Per Order No   Braces or Orthoses   (none)   Other Precautions 1:1;Cognitive;Chair Alarm;Bed Alarm;Multiple lines;Fall Risk   Home Living   Type of Home House   Home Layout Multi-level   Bathroom Shower/Tub Tub/shower unit   Bathroom Toilet Standard   Bathroom Equipment   (No DME)   Prior Function   Level of [place] Independent with ADLs;Independent with functional mobility;Needs assistance with IADLS   Lives With Spouse;Family   Receives Help From Family   IADLs Family/Friend/Other provides transportation;Family/Friend/Other provides meals;Family/Friend/Other provides medication management   Falls in the last 6 months 0   Vocational Full time employment   Lifestyle   Autonomy PTA pt was Independent with ADL's, IADLS and functional mob. Pt +    Reciprocal Relationships Supportive wife and children   Service to Others Reports use to work Full  Time as an    Intrinsic Gratification Enjoys his pet dog.   General   Family/Caregiver Present No   ADL   Eating Assistance 7  Independent   Grooming Assistance 5  Supervision/Setup   UB Bathing Assistance 5  Supervision/Setup   LB Bathing Assistance 5  Supervision/Setup   UB Dressing Assistance 5  Supervision/Setup   LB Dressing Assistance 5  Supervision/Setup   Toileting Assistance  5  Supervision/Setup   Bed Mobility   Supine to Sit 5  Supervision   Additional items Increased time required   Additional Comments pt set up to bedside chair post session   Transfers   Sit to Stand 5  Supervision   Additional items Increased time required   Stand to Sit 5  Supervision   Additional items Increased time required   Additional Comments No AD   Functional Mobility   Functional Mobility 5  Supervision   Additional Comments No AD-pt completed community distance   Balance   Static Sitting Normal   Dynamic Sitting Good   Static Standing Fair +   Dynamic Standing Fair +   Ambulatory Fair   Activity Tolerance   Activity Tolerance Patient tolerated treatment well   Medical Staff Made Aware PT Jacquelin-  Physical Therapy present for co-eval 2* medical complexity, comorbidities and limited overall tolerance to activities   Nurse Made Aware ADELINE Le cleared   RUE Assessment   RUE Assessment WFL   LUE Assessment   LUE Assessment WFL   Hand Function   Gross Motor Coordination Functional   Fine Motor Coordination Functional   Cognition   Overall Cognitive Status Impaired   Arousal/Participation Alert;Cooperative;Responsive   Attention Attends with cues to redirect   Orientation Level Oriented to person;Oriented to place;Disoriented to situation;Disoriented to time   Memory Decreased recall of precautions   Following Commands Follows one step commands with increased time or repetition   Comments Pt was pleasant and cooperative with all therapy requests. Recommended to continue pt 24 hour supervision and assist upon d/c  home with family per pt's recent [facility] stay.    Assessment   Prognosis Fair   Assessment Pt is a 64 y.o. male seen for Occupational Therapy Evaluation due to decreased activity tolerance and increased assistance required for pt to participate in and perform functional activities. Pt  was admitted to [facility] on 6/30/2025 for in-patient chemotherapy due to pt Principal Problem: Primary CNS lymphoma  and a diagnosis of Primary central nervous system (CNS) lymphoma (C83.390) Pt + for activity as tolerated, out of bed and active OT orders. Patient   has a past medical history of Colon polyp, Diabetes mellitus (HCC), GERD (gastroesophageal reflux disease), Hyperlipidemia, Hypertension, Liver disease, and Sleep apnea.  At baseline pt was completing all ADLs  and functional mobility Independently as well completed IADLs Independently. Pt lives with spouse in a multi-level story house with 3 steps to enter.  Pt currently requires S for overall ADLS and functional mobility/transfers. Pt currently presents with Minimal to no impairments in the following categories -The patient's raw score on the AM-PAC Daily Activity Inpatient Short Form is 24 . A raw score of less than 19 suggests the patient may benefit from discharge to home. Please refer to the recommendation of the Occupational Therapist for safe discharge planning. From OT standpoint, the pt is likely close to baseline and is recommended for Level III (Minimum Resource Intensity) and Return to home. OT anticipates pt to d/c to home with family support. In meantime, pt recommended pt to continue to participation in ADLS and functional mobility with hospital staff. Pt no longer requires acute-care OT services for this hospital course and plan to d/c OT at this time.    Goals   Patient Goals to return home   Plan   OT Frequency Eval only   Discharge Recommendation   Rehab Resource Intensity Level, OT III (Minimum Resource Intensity)   AM-PAC Daily Activity  Inpatient   Lower Body Dressing 3   Bathing 3   Toileting 3   Upper Body Dressing 3   Grooming 4   Eating 4   Daily Activity Raw Score 20   Daily Activity Standardized Score (Calc for Raw Score >=11) 42.03   AM-PAC Applied Cognition Inpatient   Following a Speech/Presentation 2   Understanding Ordinary Conversation 3   Taking Medications 2   Remembering Where Things Are Placed or Put Away 2   Remembering List of 4-5 Errands 1   Taking Care of Complicated Tasks 1   Applied Cognition Raw Score 11   Applied Cognition Standardized Score 27.03   End of Consult   Patient Position at End of Consult Bed/Chair alarm activated;All needs within reach;Bedside chair  (pt encouraged to stay up for lunch)           Jeanette Berman OTR/L                    [1]   Past Medical History:  Diagnosis Date    Colon polyp     Diabetes mellitus (HCC)     GERD (gastroesophageal reflux disease)     Hyperlipidemia     Hypertension     Liver disease     Sleep apnea    [2]   Past Surgical History:  Procedure Laterality Date    COLONOSCOPY      CYST REMOVAL      back    FL LUMBAR PUNCTURE DIAGNOSTIC  3/31/2025    FL LUMBAR PUNCTURE DIAGNOSTIC  4/7/2025    NV EXC B9 LESION MRGN XCP SK TG T/A/L 0.6-1.0 CM N/A 6/7/2023    Procedure: EXCISION  BIOPSY LESION/MASS ABDOMINAL/CHEST WALL;  Surgeon: Trevor Villavicencio MD;  Location: [facility];  Service: General    THIRD VENTRICULOSTOMY Left 4/14/2025    Procedure: Left frontal endoscopic biopsy of ventricular mass and placement of EVD;  Surgeon: Paul Causey MD;  Location: [facility];  Service: Neurosurgery

## 2025-07-01 NOTE — PLAN OF CARE
Problem: Prexisting or High Potential for Compromised Skin Integrity  Goal: Skin integrity is maintained or improved  Description: INTERVENTIONS:  - Identify patients at risk for skin breakdown  - Assess and monitor skin integrity including under and around medical devices   - Assess and monitor nutrition and hydration status  - Monitor labs  - Assess for incontinence   - Turn and reposition patient  - Assist with mobility/ambulation  - Relieve pressure over antonieta prominences   - Avoid friction and shearing  - Provide appropriate hygiene as needed including keeping skin clean and dry  - Evaluate need for skin moisturizer/barrier cream  - Collaborate with interdisciplinary team  - Patient/family teaching  - Consider wound care consult    Assess:  - Review [place] scale daily  - Inspect skin when repositioning, toileting, and assisting with ADLS  - Assess extremities for adequate circulation and sensation     Bed Management:  - Have minimal linens on bed & keep smooth, unwrinkled  - Change linens as needed when moist or perspiring  - Avoid sitting or lying in one position  - Toileting:  - Offer bedside commode  - Assess for incontinence  - Use incontinent care products after each incontinent episode    Activity:  - Mobilize patient  - Encourage activity and walks on unit  - Encourage or provide ROM exercises   - Turn and reposition patient  - Use appropriate equipment to lift or move patient in bed  - Instruct/ Assist with weight shifting     Skin Care:  - Avoid use of baby powder, tape, friction and shearing, hot water or constrictive clothing  - Relieve pressure over bony prominences   - Do not massage red bony areas  Outcome: Progressing     Problem: INFECTION - ADULT  Goal: Absence or prevention of progression during hospitalization  Description: INTERVENTIONS:  - Assess and monitor for signs and symptoms of infection  - Monitor lab/diagnostic results  - Monitor all insertion sites, i.e. indwelling lines, tubes,  and drains  - Monitor endotracheal if appropriate and nasal secretions for changes in amount and color  - [place] appropriate cooling/warming therapies per order  - Administer medications as ordered  - Instruct and encourage patient and family to use good hand hygiene technique  - Identify and instruct in appropriate isolation precautions for identified infection/condition  Outcome: Progressing  Goal: Absence of fever/infection during neutropenic period  Description: INTERVENTIONS:  - Monitor WBC  - Perform strict hand hygiene  - Limit to healthy visitors only  - No plants, dried, fresh or silk flowers with carrion in patient room  Outcome: Progressing     Problem: SAFETY ADULT  Goal: Patient will remain free of falls  Description: INTERVENTIONS:  - Educate patient/family on patient safety including physical limitations  - Instruct patient to call for assistance with activity   - Consider consulting OT/PT to assist with strengthening/mobility based on AM PAC & JH-HLM score  - Consult OT/PT to assist with strengthening/mobility   - Keep Call bell within reach  - Keep bed low and locked with side rails adjusted as appropriate  - Keep care items and personal belongings within reach  - Initiate and maintain comfort rounds  - Make Fall Risk Sign visible to staff  - Offer Toileting every 2 Hours, in advance of need  - Initiate/Maintain alarm  - Obtain necessary fall risk management equipment:   - Apply yellow socks and bracelet for high fall risk patients  - Consider moving patient to room near nurses station  Outcome: Progressing     Problem: DISCHARGE PLANNING  Goal: Discharge to home or other facility with appropriate resources  Description: INTERVENTIONS:  - Identify barriers to discharge w/patient and caregiver  - Arrange for needed discharge resources and transportation as appropriate  - Identify discharge learning needs (meds, wound care, etc.)  - Arrange for interpretive services to assist at discharge as needed  -  Refer to Case Management Department for coordinating discharge planning if the patient needs post-hospital services based on physician/advanced practitioner order or complex needs related to functional status, cognitive ability, or social support system  Outcome: Progressing

## 2025-07-01 NOTE — PHYSICAL THERAPY NOTE
PHYSICAL THERAPY EVALUATION  NAME:  Alexis Dennison  DATE: 07/01/25    AGE:   64 y.o.  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]    Past Medical History[1]  Past Surgical History[2]    Length Of Stay: 1  PHYSICAL THERAPY EVALUATION :    07/01/25 0940   PT Last Visit   PT Visit Date 07/01/25   Note Type   Note type Evaluation   End of Consult   Patient Position at End of Consult Bedside chair;All needs within reach;Bed/Chair alarm activated   Pain Assessment   Pain Assessment Tool 0-10   Pain Score No Pain   Restrictions/Precautions   Weight Bearing Precautions Per Order No   Braces or Orthoses   (none)   Other Precautions 1:1;Cognitive;Chair Alarm;Bed Alarm;Multiple lines;Fall Risk  (vir 1:1; connell)   Home Living   Type of Home House   Home Layout Multi-level;Stairs to enter with rails   Bathroom Shower/Tub Tub/shower unit   Bathroom Toilet Standard   Bathroom Accessibility Accessible   Additional Comments At baseline;  pt resides w/ spouse in a 2SH w/ 3 ISAIAH w/ 1st floor bathroom; 2nd floor 1* bedroom. Pt reports having spouse or family stay w/ him and assist prn; however he has been ambulating home + short community distances w/ S (for safety/ cognation) and w/o AD. 0 falls 0 drive; is a retired .   Prior Function   Level of [place] Independent with ADLs;Independent with functional mobility;Needs assistance with IADLS   Lives With Spouse;Family   Receives Help From Family   IADLs Family/Friend/Other provides transportation;Family/Friend/Other provides meals;Family/Friend/Other provides medication management   Falls in the last 6 months 0   General   Additional Pertinent History Of note pt w/ recent admission and d/c from [facility]- dc at S level w/ recommendations for 24/7 S/A for safety.   Family/Caregiver Present No   Cognition   Overall Cognitive Status Impaired   Arousal/Participation Cooperative   Attention Attends with cues to redirect   Orientation Level  Oriented to person;Oriented to place;Oriented to situation   Memory Decreased recall of precautions   Following Commands Follows one step commands with increased time or repetition   Subjective   Subjective pt supine in bed- agreeable and eager to get up adn walk   RUE Assessment   RUE Assessment WFL   LUE Assessment   LUE Assessment WFL   RLE Assessment   RLE Assessment WFL   LLE Assessment   LLE Assessment WFL   Light Touch   RLE Light Touch Grossly intact  (pt does reports some N/T at times in B/L feet and toes)   LLE Light Touch Grossly intact  (pt does reports some N/T at times in B/L feet and toes)   Proprioception   RLE Proprioception Grossly intact   LLE Proprioception Grossly Intact   Bed Mobility   Supine to Sit 5  Supervision   Transfers   Sit to Stand 5  Supervision   Additional items Verbal cues   Stand to Sit 5  Supervision   Additional items Verbal cues   Additional Comments w/o AD in room and ball- pt standing w/o UE suport > 5 mins performing pants adjustment w/o difficulty or LOB noted.   Ambulation/Elevation   Gait pattern WNL   Gait Assistance 5  Supervision   Additional items Verbal cues   Assistive Device None   Distance > 360 w/ head turns/ ball obstacles w/o LOB or difficulty.   Balance   Static Sitting Normal   Dynamic Sitting Good   Static Standing Fair +   Dynamic Standing Fair +   Ambulatory Fair   Endurance Deficit   Endurance Deficit No   Activity Tolerance   Activity Tolerance Patient tolerated treatment well   Medical Staff Made Aware RN CM for dc planning + restorative aide (William) for ongoing ambulation while admitted   Nurse Made Aware yes   Assessment   Prognosis Fair   Problem List Impaired balance;Decreased cognition;Impaired judgement;Decreased safety awareness   Assessment Pt is 64 y.o. male seen for PT evaluation s/p admit to [facility] on [facility]  w/ Primary CNS lymphoma(  recent dx- on 4/14/2025)- presenting for inpt chemotherapy (cycle 5).  Pt  has a past  "medical history of Colon polyp, Diabetes mellitus (HCC), GERD (gastroesophageal reflux disease), Hyperlipidemia, Hypertension, Liver disease, and Sleep apnea. PT consulted for eval and tx/ act as tolerated orders. Of note pt w/ recent admission and d/c from [facility]- MT at S level w/ recommendations for 24/7 S/A for safety. At baseline;  pt resides w/ spouse in a 2SH w/ 3 ISAIAH w/ 1st floor bathroom; 2nd floor 1* bedroom. Pt reports having spouse or family stay w/ him and assist prn; however he has been ambulating home + short community distances w/ S (for safety/ cognation) and w/o AD. 0 falls 0 drive; is a retired .    Due to acute medical issues, ongoing medical management/ chemo for primary dx, fall risk, impaired safety and cognition;'  increased assistance needed from caregiver at current time, continuous monitoring, trending labs;  multiple lines, health management issues; note unstable clinical picture (high complexity). Currently,  pt  presents on vir 1:1; pleasant and cooperative; A+O x3- person place and situation (\"treatment\"). Pt is able to perform bed skills transfer w/ S and cues + assist w/ IV pole. Ambualtion > 360 w/ S and w/o LOB w/ S and assist for IV only.    Pt presents functioning at/ nearing baseline and currently w/ overall mobility deficits 2* to: impaired safety/ cognition (appeards at baseline per review of [facility] notes) ;  generalized weakness/ deconditioning; decreased endurance; impaired balance;  decreased safety awareness; impaired safety and judgement; limited insight into current deficits; bed/ chair alarms; multiple lines;.  (Please find additional objective findings from PT assessment regarding body systems outlined above.) Pt is at S levels of mobility and at baseline- not further skilled pT in inpt indicated. Recommend pt ambulate 3-4x daily until time fo d/c w/ staff/ restorative to maintain CLOF until time of d/c. PT signing off- please re consult if functional " decline of skilled needs arise.   Barriers to Discharge None  (from a PT standpoint- would continue to recommend ongoing 24/7 A S for safety from family as recommended on [facility] d/c)   Goals   Patient Goals to be able to go home   Discharge Recommendation   Rehab Resource Intensity Level, PT III (Minimum Resource Intensity)   AM-PAC Basic Mobility Inpatient   Turning in Flat Bed Without Bedrails 4   Lying on Back to Sitting on Edge of Flat Bed Without Bedrails 4   Moving Bed to Chair 4   Standing Up From Chair Using Arms 4   Walk in Room 3   Climb 3-5 Stairs With Railing 3   Basic Mobility Inpatient Raw Score 22   Basic Mobility Standardized Score 47.4   Turning Head Towards Sound 4   Follow Simple Instructions 3   Low Function Basic Mobility Raw Score  29   Low Function Basic Mobility Standardized Score  45.67   [facility] Highest Level Of Mobility   -HLM Goal 7: Walk 25 feet or more   -HLM Achieved 8: Walk 250 feet ot more   End of Consult   Patient Position at End of Consult Bedside chair;Bed/Chair alarm activated;All needs within reach  (+ VIR 1:1)     The patient's AM-PAC Basic Mobility Inpatient Short Form Raw Score is 22. A Raw score of greater than 16 suggests the patient may benefit from discharge to home. Please also refer to the recommendation of the Physical Therapist for safe discharge planning.                    [1]   Past Medical History:  Diagnosis Date    Colon polyp     Diabetes mellitus (HCC)     GERD (gastroesophageal reflux disease)     Hyperlipidemia     Hypertension     Liver disease     Sleep apnea    [2]   Past Surgical History:  Procedure Laterality Date    COLONOSCOPY      CYST REMOVAL      back    FL LUMBAR PUNCTURE DIAGNOSTIC  3/31/2025    FL LUMBAR PUNCTURE DIAGNOSTIC  4/7/2025    AR EXC B9 LESION MRGN XCP SK TG T/A/L 0.6-1.0 CM N/A 6/7/2023    Procedure: EXCISION  BIOPSY LESION/MASS ABDOMINAL/CHEST WALL;  Surgeon: Trevor Villavicencio MD;  Location:  MAIN OR;  Service: General     THIRD VENTRICULOSTOMY Left 4/14/2025    Procedure: Left frontal endoscopic biopsy of ventricular mass and placement of EVD;  Surgeon: Paul Causey MD;  Location: [facility];  Service: Neurosurgery

## 2025-07-01 NOTE — PLAN OF CARE
Problem: DISCHARGE PLANNING  Goal: Discharge to home or other facility with appropriate resources  Description: INTERVENTIONS:  - Identify barriers to discharge w/patient and caregiver  - Arrange for needed discharge resources and transportation as appropriate  - Identify discharge learning needs (meds, wound care, etc.)  - Arrange for interpretive services to assist at discharge as needed  - Refer to Case Management Department for coordinating discharge planning if the patient needs post-hospital services based on physician/advanced practitioner order or complex needs related to functional status, cognitive ability, or social support system  Outcome: Progressing     Problem: SAFETY ADULT  Goal: Patient will remain free of falls  Description: INTERVENTIONS:  - Educate patient/family on patient safety including physical limitations  - Instruct patient to call for assistance with activity   - Consider consulting OT/PT to assist with strengthening/mobility based on AM PAC & JH-HLM score  - Consult OT/PT to assist with strengthening/mobility   - Keep Call bell within reach  - Keep bed low and locked with side rails adjusted as appropriate  - Keep care items and personal belongings within reach  - Initiate and maintain comfort rounds  - Make Fall Risk Sign visible to staff  - Offer Toileting every 2 Hours, in advance of need  - Initiate/Maintain alarm  - Obtain necessary fall risk management equipment:   - Apply yellow socks and bracelet for high fall risk patients  - Consider moving patient to room near nurses station  Outcome: Progressing

## 2025-07-01 NOTE — ASSESSMENT & PLAN NOTE
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

## 2025-07-02 LAB
ALBUMIN SERPL BCG-MCNC: 2.9 G/DL (ref 3.5–5)
ALBUMIN SERPL BCG-MCNC: 3.2 G/DL (ref 3.5–5)
ALP SERPL-CCNC: 44 U/L (ref 34–104)
ALP SERPL-CCNC: 49 U/L (ref 34–104)
ALT SERPL W P-5'-P-CCNC: 47 U/L (ref 7–52)
ALT SERPL W P-5'-P-CCNC: 55 U/L (ref 7–52)
AMORPH URATE CRY URNS QL MICRO: ABNORMAL
AMORPH URATE CRY URNS QL MICRO: ABNORMAL
ANION GAP SERPL CALCULATED.3IONS-SCNC: 4 MMOL/L (ref 4–13)
ANION GAP SERPL CALCULATED.3IONS-SCNC: 4 MMOL/L (ref 4–13)
ANION GAP SERPL CALCULATED.3IONS-SCNC: 7 MMOL/L (ref 4–13)
AST SERPL W P-5'-P-CCNC: 28 U/L (ref 13–39)
AST SERPL W P-5'-P-CCNC: 33 U/L (ref 13–39)
BACTERIA UR QL AUTO: ABNORMAL /HPF
BASOPHILS # BLD AUTO: 0.01 THOUSANDS/ÂΜL (ref 0–0.1)
BASOPHILS # BLD AUTO: 0.02 THOUSANDS/ÂΜL (ref 0–0.1)
BASOPHILS # BLD AUTO: 0.03 THOUSANDS/ÂΜL (ref 0–0.1)
BASOPHILS NFR BLD AUTO: 0 % (ref 0–1)
BASOPHILS NFR BLD AUTO: 0 % (ref 0–1)
BASOPHILS NFR BLD AUTO: 1 % (ref 0–1)
BILIRUB SERPL-MCNC: 0.31 MG/DL (ref 0.2–1)
BILIRUB SERPL-MCNC: 0.42 MG/DL (ref 0.2–1)
BILIRUB UR QL STRIP: NEGATIVE
BUN SERPL-MCNC: 15 MG/DL (ref 5–25)
BUN SERPL-MCNC: 15 MG/DL (ref 5–25)
BUN SERPL-MCNC: 16 MG/DL (ref 5–25)
CALCIUM ALBUM COR SERPL-MCNC: 8 MG/DL (ref 8.3–10.1)
CALCIUM ALBUM COR SERPL-MCNC: 8.8 MG/DL (ref 8.3–10.1)
CALCIUM SERPL-MCNC: 7.1 MG/DL (ref 8.4–10.2)
CALCIUM SERPL-MCNC: 8.2 MG/DL (ref 8.4–10.2)
CALCIUM SERPL-MCNC: 8.5 MG/DL (ref 8.4–10.2)
CHLORIDE SERPL-SCNC: 100 MMOL/L (ref 96–108)
CHLORIDE SERPL-SCNC: 100 MMOL/L (ref 96–108)
CHLORIDE SERPL-SCNC: 89 MMOL/L (ref 96–108)
CLARITY UR: CLEAR
CO2 SERPL-SCNC: 34 MMOL/L (ref 21–32)
CO2 SERPL-SCNC: 35 MMOL/L (ref 21–32)
CO2 SERPL-SCNC: 40 MMOL/L (ref 21–32)
COLOR UR: ABNORMAL
COLOR UR: ABNORMAL
COLOR UR: COLORLESS
COLOR UR: COLORLESS
CREAT SERPL-MCNC: 0.96 MG/DL (ref 0.6–1.3)
CREAT SERPL-MCNC: 1.01 MG/DL (ref 0.6–1.3)
CREAT SERPL-MCNC: 1.06 MG/DL (ref 0.6–1.3)
EOSINOPHIL # BLD AUTO: 0.05 THOUSAND/ÂΜL (ref 0–0.61)
EOSINOPHIL # BLD AUTO: 0.23 THOUSAND/ÂΜL (ref 0–0.61)
EOSINOPHIL # BLD AUTO: 0.27 THOUSAND/ÂΜL (ref 0–0.61)
EOSINOPHIL NFR BLD AUTO: 1 % (ref 0–6)
EOSINOPHIL NFR BLD AUTO: 5 % (ref 0–6)
EOSINOPHIL NFR BLD AUTO: 5 % (ref 0–6)
ERYTHROCYTE [DISTWIDTH] IN BLOOD BY AUTOMATED COUNT: 16.8 % (ref 11.6–15.1)
ERYTHROCYTE [DISTWIDTH] IN BLOOD BY AUTOMATED COUNT: 17 % (ref 11.6–15.1)
ERYTHROCYTE [DISTWIDTH] IN BLOOD BY AUTOMATED COUNT: 17.2 % (ref 11.6–15.1)
GFR SERPL CREATININE-BSD FRML MDRD: 73 ML/MIN/1.73SQ M
GFR SERPL CREATININE-BSD FRML MDRD: 77 ML/MIN/1.73SQ M
GFR SERPL CREATININE-BSD FRML MDRD: 82 ML/MIN/1.73SQ M
GLUCOSE SERPL-MCNC: 131 MG/DL (ref 65–140)
GLUCOSE SERPL-MCNC: 142 MG/DL (ref 65–140)
GLUCOSE SERPL-MCNC: 156 MG/DL (ref 65–140)
GLUCOSE SERPL-MCNC: 157 MG/DL (ref 65–140)
GLUCOSE SERPL-MCNC: 159 MG/DL (ref 65–140)
GLUCOSE SERPL-MCNC: 653 MG/DL (ref 65–140)
GLUCOSE UR STRIP-MCNC: NEGATIVE MG/DL
HCT VFR BLD AUTO: 20.7 % (ref 36.5–49.3)
HCT VFR BLD AUTO: 21.7 % (ref 36.5–49.3)
HCT VFR BLD AUTO: 24.6 % (ref 36.5–49.3)
HGB BLD-MCNC: 7.1 G/DL (ref 12–17)
HGB BLD-MCNC: 7.1 G/DL (ref 12–17)
HGB BLD-MCNC: 8.2 G/DL (ref 12–17)
HGB UR QL STRIP.AUTO: ABNORMAL
IMM GRANULOCYTES # BLD AUTO: 0.02 THOUSAND/UL (ref 0–0.2)
IMM GRANULOCYTES NFR BLD AUTO: 0 % (ref 0–2)
KETONES UR STRIP-MCNC: NEGATIVE MG/DL
LEUKOCYTE ESTERASE UR QL STRIP: ABNORMAL
LEUKOCYTE ESTERASE UR QL STRIP: NEGATIVE
LYMPHOCYTES # BLD AUTO: 1.4 THOUSANDS/ÂΜL (ref 0.6–4.47)
LYMPHOCYTES # BLD AUTO: 1.73 THOUSANDS/ÂΜL (ref 0.6–4.47)
LYMPHOCYTES # BLD AUTO: 2.05 THOUSANDS/ÂΜL (ref 0.6–4.47)
LYMPHOCYTES NFR BLD AUTO: 22 % (ref 14–44)
LYMPHOCYTES NFR BLD AUTO: 34 % (ref 14–44)
LYMPHOCYTES NFR BLD AUTO: 35 % (ref 14–44)
MAGNESIUM SERPL-MCNC: 1.8 MG/DL (ref 1.9–2.7)
MCH RBC QN AUTO: 31.4 PG (ref 26.8–34.3)
MCH RBC QN AUTO: 32 PG (ref 26.8–34.3)
MCH RBC QN AUTO: 32.4 PG (ref 26.8–34.3)
MCHC RBC AUTO-ENTMCNC: 32.7 G/DL (ref 31.4–37.4)
MCHC RBC AUTO-ENTMCNC: 33.3 G/DL (ref 31.4–37.4)
MCHC RBC AUTO-ENTMCNC: 34.3 G/DL (ref 31.4–37.4)
MCV RBC AUTO: 95 FL (ref 82–98)
MCV RBC AUTO: 96 FL (ref 82–98)
MCV RBC AUTO: 96 FL (ref 82–98)
MONOCYTES # BLD AUTO: 0.16 THOUSAND/ÂΜL (ref 0.17–1.22)
MONOCYTES # BLD AUTO: 0.18 THOUSAND/ÂΜL (ref 0.17–1.22)
MONOCYTES # BLD AUTO: 0.34 THOUSAND/ÂΜL (ref 0.17–1.22)
MONOCYTES NFR BLD AUTO: 3 % (ref 4–12)
MONOCYTES NFR BLD AUTO: 4 % (ref 4–12)
MONOCYTES NFR BLD AUTO: 5 % (ref 4–12)
MTX SERPL-SCNC: 3.3 UMOL/L
NEUTROPHILS # BLD AUTO: 2.98 THOUSANDS/ÂΜL (ref 1.85–7.62)
NEUTROPHILS # BLD AUTO: 3.31 THOUSANDS/ÂΜL (ref 1.85–7.62)
NEUTROPHILS # BLD AUTO: 4.65 THOUSANDS/ÂΜL (ref 1.85–7.62)
NEUTS SEG NFR BLD AUTO: 56 % (ref 43–75)
NEUTS SEG NFR BLD AUTO: 57 % (ref 43–75)
NEUTS SEG NFR BLD AUTO: 72 % (ref 43–75)
NITRITE UR QL STRIP: NEGATIVE
NON-SQ EPI CELLS URNS QL MICRO: ABNORMAL /HPF
NRBC BLD AUTO-RTO: 0 /100 WBCS
PH UR STRIP.AUTO: 8 [PH]
PH UR STRIP.AUTO: 8.5 [PH]
PLATELET # BLD AUTO: 105 THOUSANDS/UL (ref 149–390)
PLATELET # BLD AUTO: 113 THOUSANDS/UL (ref 149–390)
PLATELET # BLD AUTO: 127 THOUSANDS/UL (ref 149–390)
PMV BLD AUTO: 10.2 FL (ref 8.9–12.7)
PMV BLD AUTO: 10.8 FL (ref 8.9–12.7)
PMV BLD AUTO: 9.9 FL (ref 8.9–12.7)
POTASSIUM SERPL-SCNC: 3 MMOL/L (ref 3.5–5.3)
POTASSIUM SERPL-SCNC: 3.2 MMOL/L (ref 3.5–5.3)
POTASSIUM SERPL-SCNC: 3.5 MMOL/L (ref 3.5–5.3)
PROT SERPL-MCNC: 4.5 G/DL (ref 6.4–8.4)
PROT SERPL-MCNC: 5.1 G/DL (ref 6.4–8.4)
PROT UR STRIP-MCNC: ABNORMAL MG/DL
PROT UR STRIP-MCNC: NEGATIVE MG/DL
RBC # BLD AUTO: 2.19 MILLION/UL (ref 3.88–5.62)
RBC # BLD AUTO: 2.26 MILLION/UL (ref 3.88–5.62)
RBC # BLD AUTO: 2.56 MILLION/UL (ref 3.88–5.62)
RBC #/AREA URNS AUTO: ABNORMAL /HPF
SODIUM SERPL-SCNC: 133 MMOL/L (ref 135–147)
SODIUM SERPL-SCNC: 139 MMOL/L (ref 135–147)
SODIUM SERPL-SCNC: 141 MMOL/L (ref 135–147)
SP GR UR STRIP.AUTO: 1.01 (ref 1–1.03)
UROBILINOGEN UR STRIP-ACNC: <2 MG/DL
WBC # BLD AUTO: 5.16 THOUSAND/UL (ref 4.31–10.16)
WBC # BLD AUTO: 5.84 THOUSAND/UL (ref 4.31–10.16)
WBC # BLD AUTO: 6.47 THOUSAND/UL (ref 4.31–10.16)
WBC #/AREA URNS AUTO: ABNORMAL /HPF

## 2025-07-02 PROCEDURE — 99233 SBSQ HOSP IP/OBS HIGH 50: CPT | Performed by: INTERNAL MEDICINE

## 2025-07-02 PROCEDURE — 99232 SBSQ HOSP IP/OBS MODERATE 35: CPT | Performed by: FAMILY MEDICINE

## 2025-07-02 PROCEDURE — 80053 COMPREHEN METABOLIC PANEL: CPT | Performed by: FAMILY MEDICINE

## 2025-07-02 PROCEDURE — 85025 COMPLETE CBC W/AUTO DIFF WBC: CPT | Performed by: INTERNAL MEDICINE

## 2025-07-02 PROCEDURE — 82948 REAGENT STRIP/BLOOD GLUCOSE: CPT

## 2025-07-02 PROCEDURE — 85025 COMPLETE CBC W/AUTO DIFF WBC: CPT | Performed by: FAMILY MEDICINE

## 2025-07-02 PROCEDURE — 81001 URINALYSIS AUTO W/SCOPE: CPT | Performed by: INTERNAL MEDICINE

## 2025-07-02 PROCEDURE — 83735 ASSAY OF MAGNESIUM: CPT | Performed by: FAMILY MEDICINE

## 2025-07-02 PROCEDURE — 80048 BASIC METABOLIC PNL TOTAL CA: CPT | Performed by: FAMILY MEDICINE

## 2025-07-02 PROCEDURE — 80053 COMPREHEN METABOLIC PANEL: CPT | Performed by: INTERNAL MEDICINE

## 2025-07-02 PROCEDURE — 80204 DRUG ASSAY METHOTREXATE: CPT | Performed by: FAMILY MEDICINE

## 2025-07-02 RX ORDER — LEUCOVORIN CALCIUM 25 MG/1
25 TABLET ORAL EVERY 6 HOURS
Status: COMPLETED | OUTPATIENT
Start: 2025-07-03 | End: 2025-07-04

## 2025-07-02 RX ADMIN — DOCUSATE SODIUM 100 MG: 100 CAPSULE, LIQUID FILLED ORAL at 17:04

## 2025-07-02 RX ADMIN — DEXTROSE MONOHYDRATE 25 MG: 50 INJECTION, SOLUTION INTRAVENOUS at 16:47

## 2025-07-02 RX ADMIN — HEPARIN SODIUM 5000 UNITS: 5000 INJECTION INTRAVENOUS; SUBCUTANEOUS at 05:19

## 2025-07-02 RX ADMIN — SODIUM BICARBONATE 150 ML/HR: 84 INJECTION, SOLUTION INTRAVENOUS at 10:08

## 2025-07-02 RX ADMIN — DEXTROSE MONOHYDRATE 25 MG: 50 INJECTION, SOLUTION INTRAVENOUS at 10:08

## 2025-07-02 RX ADMIN — ALLOPURINOL 300 MG: 300 TABLET ORAL at 08:07

## 2025-07-02 RX ADMIN — HEPARIN SODIUM 5000 UNITS: 5000 INJECTION INTRAVENOUS; SUBCUTANEOUS at 22:03

## 2025-07-02 RX ADMIN — SODIUM BICARBONATE 150 ML/HR: 84 INJECTION, SOLUTION INTRAVENOUS at 00:21

## 2025-07-02 RX ADMIN — SODIUM BICARBONATE 150 ML/HR: 84 INJECTION, SOLUTION INTRAVENOUS at 16:46

## 2025-07-02 RX ADMIN — DOCUSATE SODIUM 100 MG: 100 CAPSULE, LIQUID FILLED ORAL at 08:08

## 2025-07-02 RX ADMIN — PANTOPRAZOLE SODIUM 40 MG: 40 TABLET, DELAYED RELEASE ORAL at 05:19

## 2025-07-02 RX ADMIN — HEPARIN SODIUM 5000 UNITS: 5000 INJECTION INTRAVENOUS; SUBCUTANEOUS at 14:10

## 2025-07-02 RX ADMIN — DEXTROSE MONOHYDRATE 25 MG: 50 INJECTION, SOLUTION INTRAVENOUS at 22:06

## 2025-07-02 RX ADMIN — QUETIAPINE FUMARATE 12.5 MG: 25 TABLET ORAL at 22:04

## 2025-07-02 RX ADMIN — QUETIAPINE FUMARATE 12.5 MG: 25 TABLET ORAL at 11:44

## 2025-07-02 RX ADMIN — ATORVASTATIN CALCIUM 20 MG: 20 TABLET, FILM COATED ORAL at 08:07

## 2025-07-02 RX ADMIN — DEXTROSE MONOHYDRATE 25 MG: 50 INJECTION, SOLUTION INTRAVENOUS at 04:03

## 2025-07-02 RX ADMIN — INSULIN LISPRO 1 UNITS: 100 INJECTION, SOLUTION INTRAVENOUS; SUBCUTANEOUS at 17:04

## 2025-07-02 RX ADMIN — SENNOSIDES 17.2 MG: 8.6 TABLET, FILM COATED ORAL at 11:44

## 2025-07-02 NOTE — ASSESSMENT & PLAN NOTE
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 Established with Dr. Phan (next appt 7/18)     On Q-14 days High-dose IV Methotrexate   C1 (4/18) 8 g/m²  C2 (5/2) 3 g/m², dose-reduced due to LETY, delayed x1 day due to E. coli bacteremia   C3 (5/21) 3 g/m²   C4 : 3 g/m² ; given on 6/17/25     S/p Rituximab x 6 doses 4/17/25 - 6/19/2025    On Temozolamide 90 mg a day , on days 7 - 11 (received last on 6/23 - 6/27)      PLAN   - here for high dose methotrexate cycle 5 day 1, vitals, labs, exam and ROS reviewed, okay to proceed with treatment as planned   - daily CBC and CMP, noted decreases in all cell lines similar to prior admissions  - IV Sodium Bicarb + Q6H UA as per protocol, UA PH goal > 7.0   - MTX level 24H (3.30), 48H, etc from MTX start time; goal before discharge < 0.05   - Q6H Leucovorin 24 hours post MTX   - on ppx allopurinol

## 2025-07-02 NOTE — PLAN OF CARE
Problem: Prexisting or High Potential for Compromised Skin Integrity  Goal: Skin integrity is maintained or improved  Description: INTERVENTIONS:  - Identify patients at risk for skin breakdown  - Assess and monitor skin integrity including under and around medical devices   - Assess and monitor nutrition and hydration status  - Monitor labs  - Assess for incontinence   - Turn and reposition patient  - Assist with mobility/ambulation  - Relieve pressure over [place] prominences   - Avoid friction and shearing  - Provide appropriate hygiene as needed including keeping skin clean and dry  - Evaluate need for skin moisturizer/barrier cream  - Collaborate with interdisciplinary team  - Patient/family teaching  - Consider wound care consult    Assess:  - Review [place] scale daily  - Inspect skin when repositioning, toileting, and assisting with ADLS  - Assess extremities for adequate circulation and sensation     Bed Management:  - Have minimal linens on bed & keep smooth, unwrinkled  - Change linens as needed when moist or perspiring  - Avoid sitting or lying in one position  - Toileting:  - Offer bedside commode  - Assess for incontinence  - Use incontinent care products after each incontinent episode    Activity:  - Mobilize patient  - Encourage activity and walks on unit  - Encourage or provide ROM exercises   - Turn and reposition patient  - Use appropriate equipment to lift or move patient in bed  - Instruct/ Assist with weight shifting     Skin Care:  - Avoid use of baby powder, tape, friction and shearing, hot water or constrictive clothing  - Relieve pressure over bony prominences   - Do not massage red bony areas  Outcome: Progressing     Problem: INFECTION - ADULT  Goal: Absence of fever/infection during neutropenic period  Description: INTERVENTIONS:  - Monitor WBC  - Perform strict hand hygiene  - Limit to healthy visitors only  - No plants, dried, fresh or silk flowers with carrion in patient room  Outcome:  Progressing     Problem: SAFETY ADULT  Goal: Patient will remain free of falls  Description: INTERVENTIONS:  - Educate patient/family on patient safety including physical limitations  - Instruct patient to call for assistance with activity   - Consider consulting OT/PT to assist with strengthening/mobility based on AM PAC & JH-HLM score  - Consult OT/PT to assist with strengthening/mobility   - Keep Call bell within reach  - Keep bed low and locked with side rails adjusted as appropriate  - Keep care items and personal belongings within reach  - Initiate and maintain comfort rounds  - Make Fall Risk Sign visible to staff  - Offer Toileting every 2 Hours, in advance of need  - Initiate/Maintain alarm  - Obtain necessary fall risk management equipment:   - Apply yellow socks and bracelet for high fall risk patients  - Consider moving patient to room near nurses station  Outcome: Progressing

## 2025-07-02 NOTE — PLAN OF CARE
Problem: SAFETY ADULT  Goal: Patient will remain free of falls  Description: INTERVENTIONS:  - Educate patient/family on patient safety including physical limitations  - Instruct patient to call for assistance with activity   - Consider consulting OT/PT to assist with strengthening/mobility based on AM PAC & JH-HLM score  - Consult OT/PT to assist with strengthening/mobility   - Keep Call bell within reach  - Keep bed low and locked with side rails adjusted as appropriate  - Keep care items and personal belongings within reach  - Initiate and maintain comfort rounds  - Make Fall Risk Sign visible to staff  - Offer Toileting every 2 Hours, in advance of need  - Initiate/Maintain alarm  - Obtain necessary fall risk management equipment:   - Apply yellow socks and bracelet for high fall risk patients  - Consider moving patient to room near nurses station  Outcome: Progressing

## 2025-07-02 NOTE — ASSESSMENT & PLAN NOTE
Lab Results   Component Value Date    HGBA1C 5.5 06/16/2025       Recent Labs     07/01/25  1559 07/01/25 2020 07/02/25  0734 07/02/25  1100   POCGLU 180* 177* 131 142*       Blood Sugar Average: Last 72 hrs:  (P) 198.5PTA Janumet, holding  Insulin sliding scale while inpatient  Hypoglycemia protocol

## 2025-07-02 NOTE — RESTORATIVE TECHNICIAN NOTE
Restorative Technician Note      Patient Name: Alexis Dennison     Restorative Tech Visit Date: 07/02/25  Note Type: Mobility  Patient Position Upon Consult: Bedside chair  Activity Performed: Ambulated  Assistive Device: Other (Comment) (None)  Patient Position at End of Consult: Bedside chair; All needs within reach    Sin Ramirez, Restorative Technician

## 2025-07-02 NOTE — PROGRESS NOTES
Progress Note - Oncology-Medical   Name: Alexis Dennison 64 y.o. 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 Established with Dr. Phan (next appt 7/18)     On Q-14 days High-dose IV Methotrexate   C1 (4/18) 8 g/m²  C2 (5/2) 3 g/m², dose-reduced due to LETY, delayed x1 day due to E. coli bacteremia   C3 (5/21) 3 g/m²   C4 : 3 g/m² ; given on 6/17/25     S/p Rituximab x 6 doses 4/17/25 - 6/19/2025    On Temozolamide 90 mg a day , on days 7 - 11 (received last on 6/23 - 6/27)      PLAN   - here for high dose methotrexate cycle 5 day 1, vitals, labs, exam and ROS reviewed, okay to proceed with treatment as planned   - daily CBC and CMP, noted decreases in all cell lines similar to prior admissions  - IV Sodium Bicarb + Q6H UA as per protocol, UA PH goal > 7.0   - MTX level 24H (3.30), 48H, etc from MTX start time; goal before discharge < 0.05   - Q6H Leucovorin 24 hours post MTX   - on ppx allopurinol    Subjective   Interval History:   NAEON, no new complaints, 24-hour methotrexate level is therapeutic at 3.30, urine pH remains therapeutic at 8.0, SCR is stable at 1.01, Hb decreased from 8.4-7.1, WBC decreased from 11.0-6.5, PLT decreased from 151-113    Reason for consultation:  Plan cycle 5 of high-dose methotrexate     History of present illness (6/30):  Alexis Dennison is a 64 y.o. male with primary CNS (Dx 4/14/2025, double expressor (BCL2+/MYC+), DLBCL NOS, non-germinal center, Ki-67 60-70, BCL6 rearrangement), DM2 with baseline b/l LE neuropathy, NAFLD, and HTN with his treatment course of high-dose MTX + rituximab + temozolomide patient is presenting today for inpatient planned chemotherapy as outlined above.     Objective :  Temp:  [97.5 °F (36.4 °C)-97.7 °F (36.5 °C)] 97.6 °F (36.4 °C)  HR:  [63-66]  64  BP: (116-119)/(62-64) 119/64  Resp:  [12-18] 12  SpO2:  [94 %-97 %] 97 %  O2 Device: None (Room air)    Physical Exam  - GEN: Appears well, alert and oriented x 3, pleasant and cooperative, in no acute distress  - HEENT: Anicteric, mucous membranes moist, PERRL and EOMI   - NECK: No lymphadenopathy, JVD or carotid bruits   - HEART: RRR, normal S1 and S2, no murmurs, clicks, gallops or rubs   - LUNGS: Clear to auscultation bilaterally; no wheezes, rales, or rhonchi  - ABDOMEN: Normal bowel sounds, soft, no tenderness, no distention, no organomegaly or masses felt on exam.   - EXTREMITIES: Peripheral pulses normal; no clubbing, cyanosis, or edema  - NEURO: No focal findings, CN II-XII are grossly intact.   - Musculoskeletal: 5/5 strength, normal ROM, no swollen or erythematous joints.   - SKIN: Normal without suspicious lesions on exposed skin      Lab Results: I have reviewed the following results:CBC/BMP:   .     07/02/25  0516 07/02/25  0811   WBC 6.47  --    HGB 7.1*  --    HCT 21.7*  --    *  --    SODIUM  --  141   K  --  3.2*   CL  --  100   CO2  --  34*   BUN  --  15   CREATININE  --  1.01   GLUC  --  159*   MG  --  1.8*    , LFTs: No new results in last 24 hours.   Lab Results   Component Value Date    K 3.2 (L) 07/02/2025     07/02/2025    CO2 34 (H) 07/02/2025    BUN 15 07/02/2025    CREATININE 1.01 07/02/2025    GLUF 109 (H) 06/12/2025    CALCIUM 8.5 07/02/2025    CORRECTEDCA 9.2 05/27/2025    AST 11 (L) 06/30/2025    ALT 13 06/30/2025    ALKPHOS 81 06/30/2025    EGFR 77 07/02/2025     Lab Results   Component Value Date    WBC 6.47 07/02/2025    HGB 7.1 (L) 07/02/2025    HCT 21.7 (L) 07/02/2025    MCV 96 07/02/2025     (L) 07/02/2025     Lab Results   Component Value Date    NEUTROABS 4.65 07/02/2025     Administrative Statements   I have spent a total time of 30 minutes in caring for this patient on the day of the visit/encounter including Diagnostic results, Prognosis, Risks and  benefits of tx options, Instructions for management, Patient and family education, Importance of tx compliance, Risk factor reductions, Impressions, Counseling / Coordination of care, Documenting in the medical record, Reviewing/placing orders in the medical record (including tests, medications, and/or procedures), Obtaining or reviewing history  , and Communicating with other healthcare professionals .    This note has been generated by voice recognition software system. Therefore, there may be spelling, grammar, and or syntax errors. Please contact if questions arise.     .Additional recommendations to follow per attending, Dr. Harry.    Bryson Garcia DO, PGY5  Hematology/Oncology Fellow

## 2025-07-02 NOTE — PROGRESS NOTES
Progress Note - Hospitalist   Name: Alexis Dennison 64 y.o. male I MRN: [id number]  Unit/Bed#: [facility] 901-01 I Date of Admission: 6/30/2025   Date of Service: 7/2/2025 I Hospital Day: 2    Assessment & Plan  Primary CNS lymphoma  Diagnosed on 4/14/2025, double expressor (BCL2+/MYC+), DLBCL NOS, non-germinal center, Ki-67 60-70, BCL6 rearrangement   Has been receiving inpatient high-dose methotrexate and rituximab in outpatient temozolomide 90 mg a day on day 7-11  Admitted for initiation of cycle 5  Further management per oncology  Virtual one-to-one for safety  Supportive care with as needed medications for nausea, vomiting, pain    Type 2 diabetes mellitus with hyperglycemia, without long-term current use of insulin ([facility])  Lab Results   Component Value Date    HGBA1C 5.5 06/16/2025       Recent Labs     07/01/25  1559 07/01/25  2020 07/02/25  0734 07/02/25  1100   POCGLU 180* 177* 131 142*       Blood Sugar Average: Last 72 hrs:  (P) 198.5PTA Janumet, holding  Insulin sliding scale while inpatient  Hypoglycemia protocol    HTN (hypertension)  Not on antihypertensives PTA  BP reviewed and stable  Continue to monitor  Anemia  Normocytic  Baseline hemoglobin 8-10  Hemoglobin admission 8.5  No recent iron profile, will order  Continue to monitor and transfuse less than 7    VTE Pharmacologic Prophylaxis: VTE Score: 4 Moderate Risk (Score 3-4) - Pharmacological DVT Prophylaxis Ordered: heparin.    Mobility:   Basic Mobility Inpatient Raw Score: 18  JH-HLM Goal: 6: Walk 10 steps or more  JH-HLM Achieved: 7: Walk 25 feet or more  JH-HLM Goal achieved. Continue to encourage appropriate mobility.    Patient Centered Rounds: I performed bedside rounds with nursing staff today.   Discussions with Specialists or Other Care Team Provider: HemOn    Education and Discussions with Family / Patient: Patient declined call to .     Current Length of Stay: 2 day(s)  Current Patient Status: Inpatient   Certification  Statement: The patient will continue to require additional inpatient hospital stay due to Chemotherapy   Discharge Plan: Anticipate discharge in 48-72 hrs to home.    Code Status: Level 1 - Full Code    Subjective   This is a very pleasant 64 y.o. male who was seen today at bedside. Patient has no new complaints. Patient is not in any acute distress.       Objective :  Temp:  [97.5 °F (36.4 °C)-97.7 °F (36.5 °C)] 97.6 °F (36.4 °C)  HR:  [63-66] 64  BP: (116-119)/(62-64) 119/64  Resp:  [12-18] 12  SpO2:  [94 %-97 %] 97 %  O2 Device: None (Room air)    Body mass index is 25.65 kg/m².     Input and Output Summary (last 24 hours):     Intake/Output Summary (Last 24 hours) at 7/2/2025 1420  Last data filed at 7/2/2025 1259  Gross per 24 hour   Intake 2662.5 ml   Output 3925 ml   Net -1262.5 ml       Physical Exam  Vitals reviewed.   HENT:      Head: Normocephalic.      Nose: No congestion.      Mouth/Throat:      Pharynx: No oropharyngeal exudate or posterior oropharyngeal erythema.     Eyes:      Conjunctiva/sclera: Conjunctivae normal.       Cardiovascular:      Rate and Rhythm: Normal rate and regular rhythm.   Pulmonary:      Effort: Pulmonary effort is normal.   Abdominal:      General: Abdomen is flat.      Palpations: Abdomen is soft.     Skin:     General: Skin is warm and dry.     Neurological:      Mental Status: He is alert and oriented to person, place, and time. Mental status is at baseline.           Lines/Drains:  Lines/Drains/Airways       Active Status       Name Placement date Placement time Site Days    PICC Line 06/30/25 Left Brachial 06/30/25  1005  Brachial  2    Urethral Catheter 16 Fr. 07/01/25  0925  --  1                  Urinary Catheter:  Goal for removal: Voiding trial when ambulation improves         Central Line:  Goal for removal: Port accessed. Will de-access as appropriate.               Lab Results: I have reviewed the following results:   Results from last 7 days   Lab Units  07/02/25  0516   WBC Thousand/uL 6.47   HEMOGLOBIN g/dL 7.1*   HEMATOCRIT % 21.7*   PLATELETS Thousands/uL 113*   SEGS PCT % 72   LYMPHO PCT % 22   MONO PCT % 5   EOS PCT % 1     Results from last 7 days   Lab Units 07/02/25  0811 07/01/25  0635 06/30/25  1036   SODIUM mmol/L 141   < > 140   POTASSIUM mmol/L 3.2*   < > 4.0   CHLORIDE mmol/L 100   < > 104   CO2 mmol/L 34*   < > 31   BUN mg/dL 15   < > 18   CREATININE mg/dL 1.01   < > 0.94   ANION GAP mmol/L 7   < > 5   CALCIUM mg/dL 8.5   < > 8.6   ALBUMIN g/dL  --   --  3.6   TOTAL BILIRUBIN mg/dL  --   --  0.30   ALK PHOS U/L  --   --  81   ALT U/L  --   --  13   AST U/L  --   --  11*   GLUCOSE RANDOM mg/dL 159*   < > 161*    < > = values in this interval not displayed.         Results from last 7 days   Lab Units 07/02/25  1100 07/02/25  0734 07/01/25  2020 07/01/25  1559 07/01/25  1108 07/01/25  0727 06/30/25  2026 06/30/25  1624   POC GLUCOSE mg/dl 142* 131 177* 180* 240* 167* 394* 157*               Recent Cultures (last 7 days):   Results from last 7 days   Lab Units 06/26/25  0854   URINE CULTURE  No Growth <1000 cfu/mL       Imaging Results Review: No pertinent imaging studies reviewed.  Other Study Results Review: No additional pertinent studies reviewed.    Last 24 Hours Medication List:     Current Facility-Administered Medications:     allopurinol (ZYLOPRIM) tablet 300 mg, Daily    alteplase (CATHFLO) injection 2 mg, Q1MIN PRN    alteplase (CATHFLO) injection 2 mg, Q1MIN PRN    atorvastatin (LIPITOR) tablet 20 mg, Daily    docusate sodium (COLACE) capsule 100 mg, BID    heparin (porcine) subcutaneous injection 5,000 Units, Q8H CAIT    insulin lispro (HumALOG/ADMELOG) 100 units/mL subcutaneous injection 1-6 Units, TID AC **AND** Fingerstick Glucose (POCT), TID AC    leucovorin 25 mg in dextrose 5 % 50 mL IVPB, Q6H, Last Rate: Stopped (07/02/25 1044)    ondansetron (ZOFRAN-ODT) dispersible tablet 4 mg, Q8H PRN    pantoprazole (PROTONIX) EC tablet 40 mg,  Early Morning    QUEtiapine (SEROquel) tablet 12.5 mg, BID    senna (SENOKOT) tablet 17.2 mg, Daily With Lunch    sodium bicarbonate 100 mEq in dextrose 5 % 1,000 mL infusion, Continuous, Last Rate: 150 mL/hr (07/02/25 1008)    sodium chloride 0.9 % infusion, Once PRN, Last Rate: 20 mL/hr (06/30/25 1838)    Administrative Statements   Today, Patient Was Seen By: Crispin Arana MD  I have spent a total time of 40 minutes in caring for this patient on the day of the visit/encounter including Diagnostic results, Prognosis, Risks and benefits of tx options, Patient and family education, Risk factor reductions, Documenting in the medical record, and Reviewing/placing orders in the medical record (including tests, medications, and/or procedures).    **Please Note: This note may have been constructed using a voice recognition system.**

## 2025-07-03 ENCOUNTER — APPOINTMENT (OUTPATIENT)
Facility: CLINIC | Age: 65
End: 2025-07-03
Payer: COMMERCIAL

## 2025-07-03 LAB
ANION GAP SERPL CALCULATED.3IONS-SCNC: 7 MMOL/L (ref 4–13)
BASOPHILS # BLD AUTO: 0.02 THOUSANDS/ÂΜL (ref 0–0.1)
BASOPHILS NFR BLD AUTO: 0 % (ref 0–1)
BILIRUB UR QL STRIP: NEGATIVE
BUN SERPL-MCNC: 14 MG/DL (ref 5–25)
CALCIUM SERPL-MCNC: 8.1 MG/DL (ref 8.4–10.2)
CHLORIDE SERPL-SCNC: 101 MMOL/L (ref 96–108)
CLARITY UR: CLEAR
CO2 SERPL-SCNC: 33 MMOL/L (ref 21–32)
COLOR UR: COLORLESS
CREAT SERPL-MCNC: 0.97 MG/DL (ref 0.6–1.3)
EOSINOPHIL # BLD AUTO: 0.33 THOUSAND/ÂΜL (ref 0–0.61)
EOSINOPHIL NFR BLD AUTO: 7 % (ref 0–6)
ERYTHROCYTE [DISTWIDTH] IN BLOOD BY AUTOMATED COUNT: 16.5 % (ref 11.6–15.1)
GFR SERPL CREATININE-BSD FRML MDRD: 81 ML/MIN/1.73SQ M
GLUCOSE SERPL-MCNC: 140 MG/DL (ref 65–140)
GLUCOSE SERPL-MCNC: 142 MG/DL (ref 65–140)
GLUCOSE SERPL-MCNC: 145 MG/DL (ref 65–140)
GLUCOSE SERPL-MCNC: 157 MG/DL (ref 65–140)
GLUCOSE UR STRIP-MCNC: ABNORMAL MG/DL
GLUCOSE UR STRIP-MCNC: NEGATIVE MG/DL
HCT VFR BLD AUTO: 24.8 % (ref 36.5–49.3)
HGB BLD-MCNC: 8.3 G/DL (ref 12–17)
HGB UR QL STRIP.AUTO: NEGATIVE
IMM GRANULOCYTES # BLD AUTO: 0.01 THOUSAND/UL (ref 0–0.2)
IMM GRANULOCYTES NFR BLD AUTO: 0 % (ref 0–2)
KETONES UR STRIP-MCNC: NEGATIVE MG/DL
LEUKOCYTE ESTERASE UR QL STRIP: NEGATIVE
LYMPHOCYTES # BLD AUTO: 1.58 THOUSANDS/ÂΜL (ref 0.6–4.47)
LYMPHOCYTES NFR BLD AUTO: 34 % (ref 14–44)
MCH RBC QN AUTO: 32.3 PG (ref 26.8–34.3)
MCHC RBC AUTO-ENTMCNC: 33.5 G/DL (ref 31.4–37.4)
MCV RBC AUTO: 97 FL (ref 82–98)
MONOCYTES # BLD AUTO: 0.1 THOUSAND/ÂΜL (ref 0.17–1.22)
MONOCYTES NFR BLD AUTO: 2 % (ref 4–12)
MTX SERPL-SCNC: 0.35 UMOL/L
NEUTROPHILS # BLD AUTO: 2.6 THOUSANDS/ÂΜL (ref 1.85–7.62)
NEUTS SEG NFR BLD AUTO: 57 % (ref 43–75)
NITRITE UR QL STRIP: NEGATIVE
NRBC BLD AUTO-RTO: 0 /100 WBCS
PH UR STRIP.AUTO: 8 [PH]
PH UR STRIP.AUTO: 8.5 [PH]
PLATELET # BLD AUTO: 132 THOUSANDS/UL (ref 149–390)
PMV BLD AUTO: 10.1 FL (ref 8.9–12.7)
POTASSIUM SERPL-SCNC: 3.6 MMOL/L (ref 3.5–5.3)
PROT UR STRIP-MCNC: NEGATIVE MG/DL
RBC # BLD AUTO: 2.57 MILLION/UL (ref 3.88–5.62)
SODIUM SERPL-SCNC: 141 MMOL/L (ref 135–147)
SP GR UR STRIP.AUTO: 1.01 (ref 1–1.03)
UROBILINOGEN UR STRIP-ACNC: <2 MG/DL
WBC # BLD AUTO: 4.64 THOUSAND/UL (ref 4.31–10.16)

## 2025-07-03 PROCEDURE — 81003 URINALYSIS AUTO W/O SCOPE: CPT | Performed by: INTERNAL MEDICINE

## 2025-07-03 PROCEDURE — 80204 DRUG ASSAY METHOTREXATE: CPT | Performed by: FAMILY MEDICINE

## 2025-07-03 PROCEDURE — 80048 BASIC METABOLIC PNL TOTAL CA: CPT | Performed by: FAMILY MEDICINE

## 2025-07-03 PROCEDURE — 85025 COMPLETE CBC W/AUTO DIFF WBC: CPT | Performed by: FAMILY MEDICINE

## 2025-07-03 PROCEDURE — 99232 SBSQ HOSP IP/OBS MODERATE 35: CPT | Performed by: FAMILY MEDICINE

## 2025-07-03 PROCEDURE — NC001 PR NO CHARGE: Performed by: INTERNAL MEDICINE

## 2025-07-03 PROCEDURE — 82948 REAGENT STRIP/BLOOD GLUCOSE: CPT

## 2025-07-03 RX ADMIN — SODIUM BICARBONATE 150 ML/HR: 84 INJECTION, SOLUTION INTRAVENOUS at 02:14

## 2025-07-03 RX ADMIN — HEPARIN SODIUM 5000 UNITS: 5000 INJECTION INTRAVENOUS; SUBCUTANEOUS at 14:03

## 2025-07-03 RX ADMIN — INSULIN LISPRO 1 UNITS: 100 INJECTION, SOLUTION INTRAVENOUS; SUBCUTANEOUS at 17:09

## 2025-07-03 RX ADMIN — SENNOSIDES 17.2 MG: 8.6 TABLET, FILM COATED ORAL at 12:43

## 2025-07-03 RX ADMIN — LEUCOVORIN CALCIUM 25 MG: 25 TABLET ORAL at 04:21

## 2025-07-03 RX ADMIN — DOCUSATE SODIUM 100 MG: 100 CAPSULE, LIQUID FILLED ORAL at 18:17

## 2025-07-03 RX ADMIN — SODIUM BICARBONATE 150 ML/HR: 84 INJECTION, SOLUTION INTRAVENOUS at 17:11

## 2025-07-03 RX ADMIN — QUETIAPINE FUMARATE 12.5 MG: 25 TABLET ORAL at 12:43

## 2025-07-03 RX ADMIN — ATORVASTATIN CALCIUM 20 MG: 20 TABLET, FILM COATED ORAL at 10:11

## 2025-07-03 RX ADMIN — LEUCOVORIN CALCIUM 25 MG: 25 TABLET ORAL at 22:38

## 2025-07-03 RX ADMIN — LEUCOVORIN CALCIUM 25 MG: 25 TABLET ORAL at 10:13

## 2025-07-03 RX ADMIN — DOCUSATE SODIUM 100 MG: 100 CAPSULE, LIQUID FILLED ORAL at 10:12

## 2025-07-03 RX ADMIN — PANTOPRAZOLE SODIUM 40 MG: 40 TABLET, DELAYED RELEASE ORAL at 05:54

## 2025-07-03 RX ADMIN — QUETIAPINE FUMARATE 12.5 MG: 25 TABLET ORAL at 21:58

## 2025-07-03 RX ADMIN — LEUCOVORIN CALCIUM 25 MG: 25 TABLET ORAL at 16:59

## 2025-07-03 RX ADMIN — ALLOPURINOL 300 MG: 300 TABLET ORAL at 10:11

## 2025-07-03 RX ADMIN — HEPARIN SODIUM 5000 UNITS: 5000 INJECTION INTRAVENOUS; SUBCUTANEOUS at 05:54

## 2025-07-03 RX ADMIN — HEPARIN SODIUM 5000 UNITS: 5000 INJECTION INTRAVENOUS; SUBCUTANEOUS at 21:58

## 2025-07-03 NOTE — PROGRESS NOTES
Progress Note - Hospitalist   Name: Alexis Dennison 64 y.o. male I MRN: [id number]  [facility] I Date of Admission: 6/30/2025   Date of Service: 7/3/2025 I Hospital Day: 3    Assessment & Plan  Primary CNS lymphoma  Diagnosed on 4/14/2025, double expressor (BCL2+/MYC+), DLBCL NOS, non-germinal center, Ki-67 60-70, BCL6 rearrangement   Has been receiving inpatient high-dose methotrexate and rituximab in outpatient temozolomide 90 mg a day on day 7-11  Admitted for initiation of cycle 5  Further management per oncology  Virtual one-to-one for safety  Supportive care with as needed medications for nausea, vomiting, pain    Type 2 diabetes mellitus with hyperglycemia, without long-term current use of insulin ([facility])  Lab Results   Component Value Date    HGBA1C 5.5 06/16/2025       Recent Labs     07/02/25  1100 07/02/25  1653 07/03/25  0659 07/03/25  1040   POCGLU 142* 156* 142* 140       Blood Sugar Average: Last 72 hrs:  (P) [id number] Janumet, holding  Insulin sliding scale while inpatient  Hypoglycemia protocol    HTN (hypertension)  Not on antihypertensives PTA  BP reviewed and stable  Continue to monitor  Anemia  Normocytic  Baseline hemoglobin 8-10  Hemoglobin admission 8.5  No recent iron profile, will order  Continue to monitor and transfuse less than 7    VTE Pharmacologic Prophylaxis: VTE Score: 4 Moderate Risk (Score 3-4) - Pharmacological DVT Prophylaxis Ordered: heparin.    Mobility:   Basic Mobility Inpatient Raw Score: 18  JH-HLM Goal: 6: Walk 10 steps or more  JH-HLM Achieved: 6: Walk 10 steps or more  JH-HLM Goal achieved. Continue to encourage appropriate mobility.    Patient Centered Rounds: I performed bedside rounds with nursing staff today.   Discussions with Specialists or Other Care Team Provider: Heme Onc    Education and Discussions with Family / Patient: Patient declined call to .     Current Length of Stay: 3 day(s)  Current Patient Status: Inpatient    Certification Statement: The patient will continue to require additional inpatient hospital stay due to Chemotherapy   Discharge Plan: Anticipate discharge in 48-72 hrs to home.    Code Status: Level 1 - Full Code    Subjective   This is a very pleasant 64 y.o. male who was seen today at bedside. Patient has no new complaints. Patient is not in any acute distress.       Objective :  Temp:  [97.3 °F (36.3 °C)-98.2 °F (36.8 °C)] 97.3 °F (36.3 °C)  HR:  [60-66] 61  BP: (117-135)/(64-75) 135/75  Resp:  [16] 16  SpO2:  [94 %-95 %] 95 %  O2 Device: None (Room air)    Body mass index is 25.65 kg/m².     Input and Output Summary (last 24 hours):     Intake/Output Summary (Last 24 hours) at 7/3/2025 1349  Last data filed at 7/3/2025 1226  Gross per 24 hour   Intake --   Output 4575 ml   Net -4575 ml       Physical Exam  Vitals reviewed.   HENT:      Head: Normocephalic.      Nose: No congestion.      Mouth/Throat:      Pharynx: No oropharyngeal exudate or posterior oropharyngeal erythema.     Eyes:      Conjunctiva/sclera: Conjunctivae normal.       Cardiovascular:      Rate and Rhythm: Normal rate and regular rhythm.   Pulmonary:      Effort: Pulmonary effort is normal.   Abdominal:      General: Abdomen is flat.      Palpations: Abdomen is soft.     Skin:     General: Skin is warm and dry.     Neurological:      Mental Status: He is alert and oriented to person, place, and time. Mental status is at baseline.           Lines/Drains:  Lines/Drains/Airways       Active Status       Name Placement date Placement time Site Days    PICC Line 06/30/25 Left Brachial 06/30/25  1005  Brachial  3    Urethral Catheter 16 Fr. 07/01/25  0925  --  2                  Urinary Catheter:  Goal for removal: Voiding trial when ambulation improves         Central Line:  Goal for removal: Port accessed. Will de-access as appropriate.               Lab Results: I have reviewed the following results:   Results from last 7 days   Lab Units  07/03/25  0600   WBC Thousand/uL 4.64   HEMOGLOBIN g/dL 8.3*   HEMATOCRIT % 24.8*   PLATELETS Thousands/uL 132*   SEGS PCT % 57   LYMPHO PCT % 34   MONO PCT % 2*   EOS PCT % 7*     Results from last 7 days   Lab Units 07/03/25  0600 07/02/25  2214   SODIUM mmol/L 141 139   POTASSIUM mmol/L 3.6 3.5   CHLORIDE mmol/L 101 100   CO2 mmol/L 33* 35*   BUN mg/dL 14 16   CREATININE mg/dL 0.97 1.06   ANION GAP mmol/L 7 4   CALCIUM mg/dL 8.1* 8.2*   ALBUMIN g/dL  --  3.2*   TOTAL BILIRUBIN mg/dL  --  0.42   ALK PHOS U/L  --  49   ALT U/L  --  55*   AST U/L  --  33   GLUCOSE RANDOM mg/dL 145* 157*         Results from last 7 days   Lab Units 07/03/25  1040 07/03/25  0659 07/02/25  1653 07/02/25  1100 07/02/25  0734 07/01/25  2020 07/01/25  1559 07/01/25  1108 07/01/25  0727 06/30/25  2026 06/30/25  1624   POC GLUCOSE mg/dl 140 142* 156* 142* 131 177* 180* 240* 167* 394* 157*               Recent Cultures (last 7 days):         Imaging Results Review: No pertinent imaging studies reviewed.  Other Study Results Review: No additional pertinent studies reviewed.    Last 24 Hours Medication List:     Current Facility-Administered Medications:     allopurinol (ZYLOPRIM) tablet 300 mg, Daily    alteplase (CATHFLO) injection 2 mg, Q1MIN PRN    alteplase (CATHFLO) injection 2 mg, Q1MIN PRN    alteplase (CATHFLO) injection 2 mg, Q1MIN PRN    atorvastatin (LIPITOR) tablet 20 mg, Daily    docusate sodium (COLACE) capsule 100 mg, BID    heparin (porcine) subcutaneous injection 5,000 Units, Q8H CAIT    insulin lispro (HumALOG/ADMELOG) 100 units/mL subcutaneous injection 1-6 Units, TID AC **AND** Fingerstick Glucose (POCT), TID AC    leucovorin (WELLCOVORIN) tablet 25 mg, Q6H    ondansetron (ZOFRAN-ODT) dispersible tablet 4 mg, Q8H PRN    pantoprazole (PROTONIX) EC tablet 40 mg, Early Morning    QUEtiapine (SEROquel) tablet 12.5 mg, BID    senna (SENOKOT) tablet 17.2 mg, Daily With Lunch    sodium bicarbonate 100 mEq in dextrose 5 % 1,000 mL  infusion, Continuous    sodium chloride 0.9 % infusion, Once PRN, Last Rate: 20 mL/hr (06/30/25 4266)    Administrative Statements   Today, Patient Was Seen By: Crispin Arana MD  I have spent a total time of 40 minutes in caring for this patient on the day of the visit/encounter including Diagnostic results, Prognosis, Instructions for management, Patient and family education, Importance of tx compliance, Risk factor reductions, Documenting in the medical record, and Reviewing/placing orders in the medical record (including tests, medications, and/or procedures).    **Please Note: This note may have been constructed using a voice recognition system.**   negative

## 2025-07-03 NOTE — PROGRESS NOTES
Progress Note - Oncology-Medical   Name: Alexis Dennison 64 y.o. 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 Established with Dr. Phan (next appt 7/18)     On Q-14 days High-dose IV Methotrexate   C1 (4/18) 8 g/m²  C2 (5/2) 3 g/m², dose-reduced due to LETY, delayed x1 day due to E. coli bacteremia   C3 (5/21) 3 g/m²   C4 (6/17) 3 g/m²     S/p Rituximab x 6 doses 4/17/25 - 6/19/2025    On Temozolamide 90 mg a day , on days 7 - 11 (received last on 6/23 - 6/27)      PLAN   - here for high dose methotrexate cycle 5 day 1, vitals, labs, exam and ROS reviewed, okay to proceed with treatment as planned   - daily CBC and CMP, noted decreases in all cell lines similar to prior admissions  - IV Sodium Bicarb + Q6H UA as per protocol, UA PH goal > 7.0   - MTX level 24H (3.30), 48H (0.35), 72H () from MTX start time; goal before discharge < 0.05   - Q6H Leucovorin 24 hours post MTX   - on ppx allopurinol  - anticipate discharge on 7/4 if current trend holds    Subjective   Interval History:   NAEON, no new complaints, 24-hour methotrexate level is therapeutic at 0.35 at 48H, urine pH remains therapeutic at 8.0, SCR is stable, Hb increased from 8.2 to 8.3, WBC decreased from 5.8-4.6, and PLT increased from 127 to 132.    Reason for consultation:  Plan cycle 5 of high-dose methotrexate     History of present illness (6/30):  Alexis Dennison is a 64 y.o. male with primary CNS (Dx 4/14/2025, double expressor (BCL2+/MYC+), DLBCL NOS, non-germinal center, Ki-67 60-70, BCL6 rearrangement), DM2 with baseline b/l LE neuropathy, NAFLD, and HTN with his treatment course of high-dose MTX + rituximab + temozolomide patient is presenting today for inpatient planned chemotherapy as outlined above.     Objective :  Temp:  [97.3 °F (36.3  °C)-98.2 °F (36.8 °C)] 97.3 °F (36.3 °C)  HR:  [60-66] 61  BP: (117-135)/(64-75) 135/75  Resp:  [16] 16  SpO2:  [94 %-95 %] 95 %  O2 Device: None (Room air)    Physical Exam  - GEN: Appears well, alert and oriented x 3, pleasant and cooperative, in no acute distress  - HEENT: Anicteric, mucous membranes moist, PERRL and EOMI   - NECK: No lymphadenopathy, JVD or carotid bruits   - HEART: RRR, normal S1 and S2, no murmurs, clicks, gallops or rubs   - LUNGS: Clear to auscultation bilaterally; no wheezes, rales, or rhonchi  - ABDOMEN: Normal bowel sounds, soft, no tenderness, no distention, no organomegaly or masses felt on exam.   - EXTREMITIES: Peripheral pulses normal; no clubbing, cyanosis, or edema  - NEURO: No focal findings, CN II-XII are grossly intact.   - Musculoskeletal: 5/5 strength, normal ROM, no swollen or erythematous joints.   - SKIN: Normal without suspicious lesions on exposed skin      Lab Results: I have reviewed the following results:CBC/BMP:   .     07/03/25  0600   WBC 4.64   HGB 8.3*   HCT 24.8*   *   SODIUM 141   K 3.6      CO2 33*   BUN 14   CREATININE 0.97   GLUC 145*    , LFTs:   .     07/02/25  2214   AST 33   ALT 55*   ALB 3.2*   TBILI 0.42   ALKPHOS 49      Lab Results   Component Value Date    K 3.6 07/03/2025     07/03/2025    CO2 33 (H) 07/03/2025    BUN 14 07/03/2025    CREATININE 0.97 07/03/2025    GLUF 109 (H) 06/12/2025    CALCIUM 8.1 (L) 07/03/2025    CORRECTEDCA 8.8 07/02/2025    AST 33 07/02/2025    ALT 55 (H) 07/02/2025    ALKPHOS 49 07/02/2025    EGFR 81 07/03/2025     Lab Results   Component Value Date    WBC 4.64 07/03/2025    HGB 8.3 (L) 07/03/2025    HCT 24.8 (L) 07/03/2025    MCV 97 07/03/2025     (L) 07/03/2025     Lab Results   Component Value Date    NEUTROABS 2.60 07/03/2025     Administrative Statements   I have spent a total time of 30 minutes in caring for this patient on the day of the visit/encounter including Diagnostic results,  Prognosis, Risks and benefits of tx options, Instructions for management, Patient and family education, Importance of tx compliance, Risk factor reductions, Impressions, Counseling / Coordination of care, Documenting in the medical record, Reviewing/placing orders in the medical record (including tests, medications, and/or procedures), Obtaining or reviewing history  , and Communicating with other healthcare professionals .    This note has been generated by voice recognition software system. Therefore, there may be spelling, grammar, and or syntax errors. Please contact if questions arise.     .Additional recommendations to follow per attending, Dr. Harry.    Bryson Garcia DO, PGY5  Hematology/Oncology Fellow

## 2025-07-03 NOTE — PLAN OF CARE
Problem: INFECTION - ADULT  Goal: Absence or prevention of progression during hospitalization  Description: INTERVENTIONS:  - Assess and monitor for signs and symptoms of infection  - Monitor lab/diagnostic results  - Monitor all insertion sites, i.e. indwelling lines, tubes, and drains  - Monitor endotracheal if appropriate and nasal secretions for changes in amount and color  - [place] appropriate cooling/warming therapies per order  - Administer medications as ordered  - Instruct and encourage patient and family to use good hand hygiene technique  - Identify and instruct in appropriate isolation precautions for identified infection/condition  Outcome: Progressing  Goal: Absence of fever/infection during neutropenic period  Description: INTERVENTIONS:  - Monitor WBC  - Perform strict hand hygiene  - Limit to healthy visitors only  - No plants, dried, fresh or silk flowers with carrion in patient room  Outcome: Progressing     Problem: SAFETY ADULT  Goal: Patient will remain free of falls  Description: INTERVENTIONS:  - Educate patient/family on patient safety including physical limitations  - Instruct patient to call for assistance with activity   - Consider consulting OT/PT to assist with strengthening/mobility based on AM PAC & JH-HLM score  - Consult OT/PT to assist with strengthening/mobility   - Keep Call bell within reach  - Keep bed low and locked with side rails adjusted as appropriate  - Keep care items and personal belongings within reach  - Initiate and maintain comfort rounds  - Make Fall Risk Sign visible to staff  - Offer Toileting every 2 Hours, in advance of need  - Initiate/Maintain alarm  - Obtain necessary fall risk management equipment:   - Apply yellow socks and bracelet for high fall risk patients  - Consider moving patient to room near nurses station  Outcome: Progressing     Problem: DISCHARGE PLANNING  Goal: Discharge to home or other facility with appropriate resources  Description:  INTERVENTIONS:  - Identify barriers to discharge w/patient and caregiver  - Arrange for needed discharge resources and transportation as appropriate  - Identify discharge learning needs (meds, wound care, etc.)  - Arrange for interpretive services to assist at discharge as needed  - Refer to Case Management Department for coordinating discharge planning if the patient needs post-hospital services based on physician/advanced practitioner order or complex needs related to functional status, cognitive ability, or social support system  Outcome: Progressing     Problem: Prexisting or High Potential for Compromised Skin Integrity  Goal: Skin integrity is maintained or improved  Description: INTERVENTIONS:  - Identify patients at risk for skin breakdown  - Assess and monitor skin integrity including under and around medical devices   - Assess and monitor nutrition and hydration status  - Monitor labs  - Assess for incontinence   - Turn and reposition patient  - Assist with mobility/ambulation  - Relieve pressure over antonieta prominences   - Avoid friction and shearing  - Provide appropriate hygiene as needed including keeping skin clean and dry  - Evaluate need for skin moisturizer/barrier cream  - Collaborate with interdisciplinary team  - Patient/family teaching  - Consider wound care consult    Assess:  - Review [place] scale daily  - Inspect skin when repositioning, toileting, and assisting with ADLS  - Assess extremities for adequate circulation and sensation     Bed Management:  - Have minimal linens on bed & keep smooth, unwrinkled  - Change linens as needed when moist or perspiring  - Avoid sitting or lying in one position  - Toileting:  - Offer bedside commode  - Assess for incontinence  - Use incontinent care products after each incontinent episode    Activity:  - Mobilize patient  - Encourage activity and walks on unit  - Encourage or provide ROM exercises   - Turn and reposition patient  - Use appropriate  equipment to lift or move patient in bed  - Instruct/ Assist with weight shifting     Skin Care:  - Avoid use of baby powder, tape, friction and shearing, hot water or constrictive clothing  - Relieve pressure over bony prominences   - Do not massage red bony areas  Outcome: Progressing

## 2025-07-03 NOTE — ASSESSMENT & PLAN NOTE
Lab Results   Component Value Date    HGBA1C 5.5 06/16/2025       Recent Labs     07/02/25  1100 07/02/25  1653 07/03/25  0659 07/03/25  1040   POCGLU 142* 156* 142* 140       Blood Sugar Average: Last 72 hrs:  (P) [id number] Janumet, holding  Insulin sliding scale while inpatient  Hypoglycemia protocol

## 2025-07-03 NOTE — ASSESSMENT & PLAN NOTE
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 Established with Dr. Phan (next appt 7/18)     On Q-14 days High-dose IV Methotrexate   C1 (4/18) 8 g/m²  C2 (5/2) 3 g/m², dose-reduced due to LETY, delayed x1 day due to E. coli bacteremia   C3 (5/21) 3 g/m²   C4 (6/17) 3 g/m²     S/p Rituximab x 6 doses 4/17/25 - 6/19/2025    On Temozolamide 90 mg a day , on days 7 - 11 (received last on 6/23 - 6/27)      PLAN   - here for high dose methotrexate cycle 5 day 1, vitals, labs, exam and ROS reviewed, okay to proceed with treatment as planned   - daily CBC and CMP, noted decreases in all cell lines similar to prior admissions  - IV Sodium Bicarb + Q6H UA as per protocol, UA PH goal > 7.0   - MTX level 24H (3.30), 48H (0.35), 72H () from MTX start time; goal before discharge < 0.05   - Q6H Leucovorin 24 hours post MTX   - on ppx allopurinol  - anticipate discharge on 7/4 if current trend holds

## 2025-07-04 LAB
ANION GAP SERPL CALCULATED.3IONS-SCNC: 6 MMOL/L (ref 4–13)
BASOPHILS # BLD AUTO: 0.02 THOUSANDS/ÂΜL (ref 0–0.1)
BASOPHILS NFR BLD AUTO: 0 % (ref 0–1)
BILIRUB UR QL STRIP: NEGATIVE
BUN SERPL-MCNC: 13 MG/DL (ref 5–25)
CALCIUM SERPL-MCNC: 8.5 MG/DL (ref 8.4–10.2)
CHLORIDE SERPL-SCNC: 102 MMOL/L (ref 96–108)
CLARITY UR: CLEAR
CO2 SERPL-SCNC: 33 MMOL/L (ref 21–32)
COLOR UR: COLORLESS
CREAT SERPL-MCNC: 0.99 MG/DL (ref 0.6–1.3)
EOSINOPHIL # BLD AUTO: 0.42 THOUSAND/ÂΜL (ref 0–0.61)
EOSINOPHIL NFR BLD AUTO: 9 % (ref 0–6)
ERYTHROCYTE [DISTWIDTH] IN BLOOD BY AUTOMATED COUNT: 15.9 % (ref 11.6–15.1)
GFR SERPL CREATININE-BSD FRML MDRD: 79 ML/MIN/1.73SQ M
GLUCOSE SERPL-MCNC: 134 MG/DL (ref 65–140)
GLUCOSE SERPL-MCNC: 150 MG/DL (ref 65–140)
GLUCOSE SERPL-MCNC: 152 MG/DL (ref 65–140)
GLUCOSE SERPL-MCNC: 181 MG/DL (ref 65–140)
GLUCOSE SERPL-MCNC: 196 MG/DL (ref 65–140)
GLUCOSE UR STRIP-MCNC: ABNORMAL MG/DL
GLUCOSE UR STRIP-MCNC: ABNORMAL MG/DL
GLUCOSE UR STRIP-MCNC: NEGATIVE MG/DL
GLUCOSE UR STRIP-MCNC: NEGATIVE MG/DL
HCT VFR BLD AUTO: 25.3 % (ref 36.5–49.3)
HGB BLD-MCNC: 8.4 G/DL (ref 12–17)
HGB UR QL STRIP.AUTO: NEGATIVE
IMM GRANULOCYTES # BLD AUTO: 0.01 THOUSAND/UL (ref 0–0.2)
IMM GRANULOCYTES NFR BLD AUTO: 0 % (ref 0–2)
KETONES UR STRIP-MCNC: NEGATIVE MG/DL
LEUKOCYTE ESTERASE UR QL STRIP: NEGATIVE
LYMPHOCYTES # BLD AUTO: 1.78 THOUSANDS/ÂΜL (ref 0.6–4.47)
LYMPHOCYTES NFR BLD AUTO: 38 % (ref 14–44)
MCH RBC QN AUTO: 31.7 PG (ref 26.8–34.3)
MCHC RBC AUTO-ENTMCNC: 33.2 G/DL (ref 31.4–37.4)
MCV RBC AUTO: 96 FL (ref 82–98)
MONOCYTES # BLD AUTO: 0.06 THOUSAND/ÂΜL (ref 0.17–1.22)
MONOCYTES NFR BLD AUTO: 1 % (ref 4–12)
NEUTROPHILS # BLD AUTO: 2.43 THOUSANDS/ÂΜL (ref 1.85–7.62)
NEUTS SEG NFR BLD AUTO: 52 % (ref 43–75)
NITRITE UR QL STRIP: NEGATIVE
NRBC BLD AUTO-RTO: 0 /100 WBCS
PH UR STRIP.AUTO: 8 [PH]
PH UR STRIP.AUTO: 8.5 [PH]
PLATELET # BLD AUTO: 157 THOUSANDS/UL (ref 149–390)
PMV BLD AUTO: 10 FL (ref 8.9–12.7)
POTASSIUM SERPL-SCNC: 3.7 MMOL/L (ref 3.5–5.3)
PROT UR STRIP-MCNC: NEGATIVE MG/DL
RBC # BLD AUTO: 2.65 MILLION/UL (ref 3.88–5.62)
SODIUM SERPL-SCNC: 141 MMOL/L (ref 135–147)
SP GR UR STRIP.AUTO: 1 (ref 1–1.03)
SP GR UR STRIP.AUTO: 1 (ref 1–1.03)
SP GR UR STRIP.AUTO: 1.01 (ref 1–1.03)
SP GR UR STRIP.AUTO: 1.01 (ref 1–1.03)
UROBILINOGEN UR STRIP-ACNC: <2 MG/DL
WBC # BLD AUTO: 4.72 THOUSAND/UL (ref 4.31–10.16)

## 2025-07-04 PROCEDURE — 80048 BASIC METABOLIC PNL TOTAL CA: CPT | Performed by: FAMILY MEDICINE

## 2025-07-04 PROCEDURE — 82948 REAGENT STRIP/BLOOD GLUCOSE: CPT

## 2025-07-04 PROCEDURE — 81003 URINALYSIS AUTO W/O SCOPE: CPT | Performed by: INTERNAL MEDICINE

## 2025-07-04 PROCEDURE — 80204 DRUG ASSAY METHOTREXATE: CPT | Performed by: FAMILY MEDICINE

## 2025-07-04 PROCEDURE — 85025 COMPLETE CBC W/AUTO DIFF WBC: CPT | Performed by: FAMILY MEDICINE

## 2025-07-04 PROCEDURE — NC001 PR NO CHARGE: Performed by: INTERNAL MEDICINE

## 2025-07-04 PROCEDURE — 99232 SBSQ HOSP IP/OBS MODERATE 35: CPT | Performed by: FAMILY MEDICINE

## 2025-07-04 RX ADMIN — SODIUM BICARBONATE 150 ML/HR: 84 INJECTION, SOLUTION INTRAVENOUS at 10:15

## 2025-07-04 RX ADMIN — ATORVASTATIN CALCIUM 20 MG: 20 TABLET, FILM COATED ORAL at 08:45

## 2025-07-04 RX ADMIN — DOCUSATE SODIUM 100 MG: 100 CAPSULE, LIQUID FILLED ORAL at 16:45

## 2025-07-04 RX ADMIN — INSULIN LISPRO 1 UNITS: 100 INJECTION, SOLUTION INTRAVENOUS; SUBCUTANEOUS at 16:45

## 2025-07-04 RX ADMIN — SODIUM BICARBONATE 150 ML/HR: 84 INJECTION, SOLUTION INTRAVENOUS at 00:54

## 2025-07-04 RX ADMIN — HEPARIN SODIUM 5000 UNITS: 5000 INJECTION INTRAVENOUS; SUBCUTANEOUS at 05:03

## 2025-07-04 RX ADMIN — PANTOPRAZOLE SODIUM 40 MG: 40 TABLET, DELAYED RELEASE ORAL at 04:26

## 2025-07-04 RX ADMIN — SODIUM BICARBONATE 150 ML/HR: 84 INJECTION, SOLUTION INTRAVENOUS at 20:54

## 2025-07-04 RX ADMIN — HEPARIN SODIUM 5000 UNITS: 5000 INJECTION INTRAVENOUS; SUBCUTANEOUS at 13:34

## 2025-07-04 RX ADMIN — DOCUSATE SODIUM 100 MG: 100 CAPSULE, LIQUID FILLED ORAL at 08:45

## 2025-07-04 RX ADMIN — HEPARIN SODIUM 5000 UNITS: 5000 INJECTION INTRAVENOUS; SUBCUTANEOUS at 21:00

## 2025-07-04 RX ADMIN — LEUCOVORIN CALCIUM 25 MG: 25 TABLET ORAL at 10:15

## 2025-07-04 RX ADMIN — ALLOPURINOL 300 MG: 300 TABLET ORAL at 08:45

## 2025-07-04 RX ADMIN — QUETIAPINE FUMARATE 12.5 MG: 25 TABLET ORAL at 21:00

## 2025-07-04 RX ADMIN — INSULIN LISPRO 1 UNITS: 100 INJECTION, SOLUTION INTRAVENOUS; SUBCUTANEOUS at 11:46

## 2025-07-04 RX ADMIN — QUETIAPINE FUMARATE 12.5 MG: 25 TABLET ORAL at 11:45

## 2025-07-04 RX ADMIN — SENNOSIDES 17.2 MG: 8.6 TABLET, FILM COATED ORAL at 11:45

## 2025-07-04 RX ADMIN — LEUCOVORIN CALCIUM 25 MG: 25 TABLET ORAL at 04:26

## 2025-07-04 NOTE — PLAN OF CARE
Problem: Prexisting or High Potential for Compromised Skin Integrity  Goal: Skin integrity is maintained or improved  Description: INTERVENTIONS:  - Identify patients at risk for skin breakdown  - Assess and monitor skin integrity including under and around medical devices   - Assess and monitor nutrition and hydration status  - Monitor labs  - Assess for incontinence   - Turn and reposition patient  - Assist with mobility/ambulation  - Relieve pressure over antonieta prominences   - Avoid friction and shearing  - Provide appropriate hygiene as needed including keeping skin clean and dry  - Evaluate need for skin moisturizer/barrier cream  - Collaborate with interdisciplinary team  - Patient/family teaching  - Consider wound care consult    Assess:  - Review [place] scale daily  - Inspect skin when repositioning, toileting, and assisting with ADLS  - Assess extremities for adequate circulation and sensation     Bed Management:  - Have minimal linens on bed & keep smooth, unwrinkled  - Change linens as needed when moist or perspiring  - Avoid sitting or lying in one position  - Toileting:  - Offer bedside commode  - Assess for incontinence  - Use incontinent care products after each incontinent episode    Activity:  - Mobilize patient  - Encourage activity and walks on unit  - Encourage or provide ROM exercises   - Turn and reposition patient  - Use appropriate equipment to lift or move patient in bed  - Instruct/ Assist with weight shifting     Skin Care:  - Avoid use of baby powder, tape, friction and shearing, hot water or constrictive clothing  - Relieve pressure over bony prominences   - Do not massage red bony areas  Outcome: Progressing     Problem: INFECTION - ADULT  Goal: Absence or prevention of progression during hospitalization  Description: INTERVENTIONS:  - Assess and monitor for signs and symptoms of infection  - Monitor lab/diagnostic results  - Monitor all insertion sites, i.e. indwelling lines, tubes,  and drains  - Monitor endotracheal if appropriate and nasal secretions for changes in amount and color  - [place] appropriate cooling/warming therapies per order  - Administer medications as ordered  - Instruct and encourage patient and family to use good hand hygiene technique  - Identify and instruct in appropriate isolation precautions for identified infection/condition  Outcome: Progressing  Goal: Absence of fever/infection during neutropenic period  Description: INTERVENTIONS:  - Monitor WBC  - Perform strict hand hygiene  - Limit to healthy visitors only  - No plants, dried, fresh or silk flowers with carrion in patient room  Outcome: Progressing     Problem: SAFETY ADULT  Goal: Patient will remain free of falls  Description: INTERVENTIONS:  - Educate patient/family on patient safety including physical limitations  - Instruct patient to call for assistance with activity   - Consider consulting OT/PT to assist with strengthening/mobility based on AM PAC & JH-HLM score  - Consult OT/PT to assist with strengthening/mobility   - Keep Call bell within reach  - Keep bed low and locked with side rails adjusted as appropriate  - Keep care items and personal belongings within reach  - Initiate and maintain comfort rounds  - Make Fall Risk Sign visible to staff  - Offer Toileting every 2 Hours, in advance of need  - Initiate/Maintain alarm  - Obtain necessary fall risk management equipment:   - Apply yellow socks and bracelet for high fall risk patients  - Consider moving patient to room near nurses station  Outcome: Progressing     Problem: DISCHARGE PLANNING  Goal: Discharge to home or other facility with appropriate resources  Description: INTERVENTIONS:  - Identify barriers to discharge w/patient and caregiver  - Arrange for needed discharge resources and transportation as appropriate  - Identify discharge learning needs (meds, wound care, etc.)  - Arrange for interpretive services to assist at discharge as needed  -  Refer to Case Management Department for coordinating discharge planning if the patient needs post-hospital services based on physician/advanced practitioner order or complex needs related to functional status, cognitive ability, or social support system  Outcome: Progressing

## 2025-07-04 NOTE — ASSESSMENT & PLAN NOTE
Diagnosed on 4/14/2025, double expressor (BCL2+/MYC+), DLBCL NOS, non-germinal center, Ki-67 60-70, BCL6 rearrangement   Has been receiving inpatient high-dose methotrexate and rituximab in outpatient temozolomide 90 mg a day on day 7-11  Admitted for initiation of cycle 5  Further management per oncology  Virtual one-to-one for safety  Supportive care with as needed medications for nausea, vomiting, pain  Pt maybe cleared for dc today vs tomorrow pending Oncology clearance

## 2025-07-04 NOTE — PROGRESS NOTES
Progress Note - Oncology-Medical   Name: Alexis Dennison 64 y.o. 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 Established with Dr. Phan (next appt 7/18)     On Q-14 days High-dose IV Methotrexate   C1 (4/18) 8 g/m²  C2 (5/2) 3 g/m², dose-reduced due to LETY, delayed x1 day due to E. coli bacteremia   C3 (5/21) 3 g/m²   C4 (6/17) 3 g/m²     S/p Rituximab x 6 doses 4/17/25 - 6/19/2025    On Temozolamide 90 mg a day , on days 7 - 11 (received last on 6/23 - 6/27)      PLAN   - here for high dose methotrexate cycle 5 day 1, vitals, labs, exam and ROS reviewed, okay to proceed with treatment as planned   - daily CBC and CMP, noted decreases in all cell lines similar to prior admissions  - IV Sodium Bicarb + Q6H UA as per protocol, UA PH goal > 7.0   - MTX level 24H (3.30), 48H (0.35), 72H (pending), 96H (pending) from MTX start time; goal before discharge < 0.05   - Q6H Leucovorin 24 hours post MTX   - on ppx allopurinol  - anticipate discharge on 7/5 if current trend holds    Subjective   Interval History:   NAEON, no new complaints, 24-hour methotrexate level is therapeutic at 0.35 at 48H with no result for 72H level, urine pH remains therapeutic, SCR is stable, cell counts are increasing.    Reason for consultation:  Plan cycle 5 of high-dose methotrexate     History of present illness (6/30):  Alexis Dennison is a 64 y.o. male with primary CNS (Dx 4/14/2025, double expressor (BCL2+/MYC+), DLBCL NOS, non-germinal center, Ki-67 60-70, BCL6 rearrangement), DM2 with baseline b/l LE neuropathy, NAFLD, and HTN with his treatment course of high-dose MTX + rituximab + temozolomide patient is presenting today for inpatient planned chemotherapy as outlined above.     Objective :  Temp:  [97.3 °F (36.3 °C)-98 °F (36.7 °C)]  98 °F (36.7 °C)  HR:  [67-70] 70  BP: (124-146)/(64-81) 142/81  Resp:  [16-18] 18  SpO2:  [97 %-98 %] 97 %    Physical Exam  - GEN: Appears well, alert and oriented x 3, pleasant and cooperative, in no acute distress  - HEENT: Anicteric, mucous membranes moist, PERRL and EOMI   - NECK: No lymphadenopathy, JVD or carotid bruits   - HEART: RRR, normal S1 and S2, no murmurs, clicks, gallops or rubs   - LUNGS: Clear to auscultation bilaterally; no wheezes, rales, or rhonchi  - ABDOMEN: Normal bowel sounds, soft, no tenderness, no distention, no organomegaly or masses felt on exam.   - EXTREMITIES: Peripheral pulses normal; no clubbing, cyanosis, or edema  - NEURO: No focal findings, CN II-XII are grossly intact.   - Musculoskeletal: 5/5 strength, normal ROM, no swollen or erythematous joints.   - SKIN: Normal without suspicious lesions on exposed skin      Lab Results: I have reviewed the following results:CBC/BMP:   .     07/04/25  0433   WBC 4.72   HGB 8.4*   HCT 25.3*      SODIUM 141   K 3.7      CO2 33*   BUN 13   CREATININE 0.99   GLUC 152*    , LFTs:   No new results in last 24 hours.     Lab Results   Component Value Date    K 3.7 07/04/2025     07/04/2025    CO2 33 (H) 07/04/2025    BUN 13 07/04/2025    CREATININE 0.99 07/04/2025    GLUF 109 (H) 06/12/2025    CALCIUM 8.5 07/04/2025    CORRECTEDCA 8.8 07/02/2025    AST 33 07/02/2025    ALT 55 (H) 07/02/2025    ALKPHOS 49 07/02/2025    EGFR 79 07/04/2025     Lab Results   Component Value Date    WBC 4.72 07/04/2025    HGB 8.4 (L) 07/04/2025    HCT 25.3 (L) 07/04/2025    MCV 96 07/04/2025     07/04/2025     Lab Results   Component Value Date    NEUTROABS 2.43 07/04/2025     Administrative Statements   I have spent a total time of 30 minutes in caring for this patient on the day of the visit/encounter including Diagnostic results, Prognosis, Risks and benefits of tx options, Instructions for management, Patient and family education,  Importance of tx compliance, Risk factor reductions, Impressions, Counseling / Coordination of care, Documenting in the medical record, Reviewing/placing orders in the medical record (including tests, medications, and/or procedures), Obtaining or reviewing history  , and Communicating with other healthcare professionals .    This note has been generated by voice recognition software system. Therefore, there may be spelling, grammar, and or syntax errors. Please contact if questions arise.     .Additional recommendations to follow per attending, Dr. Ortiz.    Bryson Garcia DO, PGY5  Hematology/Oncology Fellow

## 2025-07-04 NOTE — PLAN OF CARE
Problem: INFECTION - ADULT  Goal: Absence or prevention of progression during hospitalization  Description: INTERVENTIONS:  - Assess and monitor for signs and symptoms of infection  - Monitor lab/diagnostic results  - Monitor all insertion sites, i.e. indwelling lines, tubes, and drains  - Monitor endotracheal if appropriate and nasal secretions for changes in amount and color  - [place] appropriate cooling/warming therapies per order  - Administer medications as ordered  - Instruct and encourage patient and family to use good hand hygiene technique  - Identify and instruct in appropriate isolation precautions for identified infection/condition  Outcome: Progressing  Goal: Absence of fever/infection during neutropenic period  Description: INTERVENTIONS:  - Monitor WBC  - Perform strict hand hygiene  - Limit to healthy visitors only  - No plants, dried, fresh or silk flowers with carrion in patient room  Outcome: Progressing

## 2025-07-04 NOTE — PROGRESS NOTES
Progress Note - Hospitalist   Name: Alexis Dennison 64 y.o. male I MRN: [id number]  Unit/Bed#: [facility] 901-01 I Date of Admission: 6/30/2025   Date of Service: 7/4/2025 I Hospital Day: 4    Assessment & Plan  Primary CNS lymphoma  Diagnosed on 4/14/2025, double expressor (BCL2+/MYC+), DLBCL NOS, non-germinal center, Ki-67 60-70, BCL6 rearrangement   Has been receiving inpatient high-dose methotrexate and rituximab in outpatient temozolomide 90 mg a day on day 7-11  Admitted for initiation of cycle 5  Further management per oncology  Virtual one-to-one for safety  Supportive care with as needed medications for nausea, vomiting, pain  Pt maybe cleared for dc today vs tomorrow pending Oncology clearance     Type 2 diabetes mellitus with hyperglycemia, without long-term current use of insulin (HCC)  Lab Results   Component Value Date    HGBA1C 5.5 06/16/2025       Recent Labs     07/03/25  1040 07/03/25  1653 07/04/25  0702 07/04/25  1102   POCGLU 140 157* 134 150*       Blood Sugar Average: Last 72 hrs:  (P) [id number] Janumet, holding  Insulin sliding scale while inpatient  Hypoglycemia protocol    HTN (hypertension)  Not on antihypertensives PTA  BP reviewed and stable  Continue to monitor  Anemia  Normocytic  Baseline hemoglobin 8-10  Hemoglobin admission 8.5  No recent iron profile, will order  Continue to monitor and transfuse less than 7    VTE Pharmacologic Prophylaxis: VTE Score: 4 Moderate Risk (Score 3-4) - Pharmacological DVT Prophylaxis Ordered: heparin.    Mobility:   Basic Mobility Inpatient Raw Score: 16  JH-HLM Goal: 5: Stand one or more mins  JH-HLM Achieved: 2: Bed activities/Dependent transfer  JH-HLM Goal NOT achieved. Continue with multidisciplinary rounding and encourage appropriate mobility to improve upon JH-HLM goals.    Patient Centered Rounds: I performed bedside rounds with nursing staff today.   Discussions with Specialists or Other Care Team Provider: None    Education and  Discussions with Family / Patient: Patient declined call to .     Current Length of Stay: 4 day(s)  Current Patient Status: Inpatient   Certification Statement: The patient will continue to require additional inpatient hospital stay due to Safe dc planning   Discharge Plan: Anticipate discharge in >72 hrs to rehab facility.    Code Status: Level 1 - Full Code    Subjective   This is a very pleasant 64 y.o. male who was seen today at bedside. Patient has no new complaints. Patient is not in any acute distress.       Objective :  Temp:  [97.3 °F (36.3 °C)-97.8 °F (36.6 °C)] 97.3 °F (36.3 °C)  HR:  [67-71] 68  BP: (124-146)/(64-73) 146/73  Resp:  [14-16] 16  SpO2:  [97 %-100 %] 98 %    Body mass index is 25.65 kg/m².     Input and Output Summary (last 24 hours):     Intake/Output Summary (Last 24 hours) at 7/4/2025 1341  Last data filed at 7/4/2025 1226  Gross per 24 hour   Intake --   Output 5020 ml   Net -5020 ml       Physical Exam  Vitals reviewed.   HENT:      Head: Normocephalic.      Nose: No congestion.      Mouth/Throat:      Pharynx: No oropharyngeal exudate or posterior oropharyngeal erythema.     Eyes:      Conjunctiva/sclera: Conjunctivae normal.       Cardiovascular:      Rate and Rhythm: Normal rate and regular rhythm.   Pulmonary:      Effort: Pulmonary effort is normal.   Abdominal:      General: Abdomen is flat.      Palpations: Abdomen is soft.     Skin:     General: Skin is warm and dry.     Neurological:      Mental Status: He is alert and oriented to person, place, and time. Mental status is at baseline.           Lines/Drains:  Lines/Drains/Airways       Active Status       Name Placement date Placement time Site Days    PICC Line 06/30/25 Left Brachial 06/30/25  1005  Brachial  4                    Central Line:  Goal for removal: Port accessed. Will de-access as appropriate.               Lab Results: I have reviewed the following results:   Results from last 7 days   Lab Units  07/04/25  0433   WBC Thousand/uL 4.72   HEMOGLOBIN g/dL 8.4*   HEMATOCRIT % 25.3*   PLATELETS Thousands/uL 157   SEGS PCT % 52   LYMPHO PCT % 38   MONO PCT % 1*   EOS PCT % 9*     Results from last 7 days   Lab Units 07/04/25  0433 07/03/25  0600 07/02/25  2214   SODIUM mmol/L 141   < > 139   POTASSIUM mmol/L 3.7   < > 3.5   CHLORIDE mmol/L 102   < > 100   CO2 mmol/L 33*   < > 35*   BUN mg/dL 13   < > 16   CREATININE mg/dL 0.99   < > 1.06   ANION GAP mmol/L 6   < > 4   CALCIUM mg/dL 8.5   < > 8.2*   ALBUMIN g/dL  --   --  3.2*   TOTAL BILIRUBIN mg/dL  --   --  0.42   ALK PHOS U/L  --   --  49   ALT U/L  --   --  55*   AST U/L  --   --  33   GLUCOSE RANDOM mg/dL 152*   < > 157*    < > = values in this interval not displayed.         Results from last 7 days   Lab Units 07/04/25  1102 07/04/25  0702 07/03/25  1653 07/03/25  1040 07/03/25  0659 07/02/25  1653 07/02/25  1100 07/02/25  0734 07/01/25  2020 07/01/25  1559 07/01/25  1108 07/01/25  0727   POC GLUCOSE mg/dl 150* 134 157* 140 142* 156* 142* 131 177* 180* 240* 167*               Recent Cultures (last 7 days):         Imaging Results Review: No pertinent imaging studies reviewed.  Other Study Results Review: No additional pertinent studies reviewed.    Last 24 Hours Medication List:     Current Facility-Administered Medications:     allopurinol (ZYLOPRIM) tablet 300 mg, Daily    alteplase (CATHFLO) injection 2 mg, Q1MIN PRN    alteplase (CATHFLO) injection 2 mg, Q1MIN PRN    alteplase (CATHFLO) injection 2 mg, Q1MIN PRN    atorvastatin (LIPITOR) tablet 20 mg, Daily    docusate sodium (COLACE) capsule 100 mg, BID    heparin (porcine) subcutaneous injection 5,000 Units, Q8H CAIT    insulin lispro (HumALOG/ADMELOG) 100 units/mL subcutaneous injection 1-6 Units, TID AC **AND** Fingerstick Glucose (POCT), TID AC    ondansetron (ZOFRAN-ODT) dispersible tablet 4 mg, Q8H PRN    pantoprazole (PROTONIX) EC tablet 40 mg, Early Morning    QUEtiapine (SEROquel) tablet 12.5  mg, BID    senna (SENOKOT) tablet 17.2 mg, Daily With Lunch    sodium bicarbonate 100 mEq in dextrose 5 % 1,000 mL infusion, Continuous, Last Rate: 150 mL/hr (07/04/25 1015)    sodium chloride 0.9 % infusion, Once PRN, Last Rate: 20 mL/hr (06/30/25 1838)    Administrative Statements   Today, Patient Was Seen By: Crispin Arana MD  I have spent a total time of 40 minutes in caring for this patient on the day of the visit/encounter including Diagnostic results, Prognosis, Instructions for management, Patient and family education, Risk factor reductions, Obtaining or reviewing history  , and Communicating with other healthcare professionals .    **Please Note: This note may have been constructed using a voice recognition system.**

## 2025-07-04 NOTE — ASSESSMENT & PLAN NOTE
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 Established with Dr. Phan (next appt 7/18)     On Q-14 days High-dose IV Methotrexate   C1 (4/18) 8 g/m²  C2 (5/2) 3 g/m², dose-reduced due to LETY, delayed x1 day due to E. coli bacteremia   C3 (5/21) 3 g/m²   C4 (6/17) 3 g/m²     S/p Rituximab x 6 doses 4/17/25 - 6/19/2025    On Temozolamide 90 mg a day , on days 7 - 11 (received last on 6/23 - 6/27)      PLAN   - here for high dose methotrexate cycle 5 day 1, vitals, labs, exam and ROS reviewed, okay to proceed with treatment as planned   - daily CBC and CMP, noted decreases in all cell lines similar to prior admissions  - IV Sodium Bicarb + Q6H UA as per protocol, UA PH goal > 7.0   - MTX level 24H (3.30), 48H (0.35), 72H (pending), 96H (pending) from MTX start time; goal before discharge < 0.05   - Q6H Leucovorin 24 hours post MTX   - on ppx allopurinol  - anticipate discharge on 7/5 if current trend holds

## 2025-07-04 NOTE — ASSESSMENT & PLAN NOTE
Lab Results   Component Value Date    HGBA1C 5.5 06/16/2025       Recent Labs     07/03/25  1040 07/03/25  1653 07/04/25  0702 07/04/25  1102   POCGLU 140 157* 134 150*       Blood Sugar Average: Last 72 hrs:  (P) [id number] Janumet, holding  Insulin sliding scale while inpatient  Hypoglycemia protocol

## 2025-07-05 VITALS
SYSTOLIC BLOOD PRESSURE: 138 MMHG | BODY MASS INDEX: 25.6 KG/M2 | TEMPERATURE: 98.1 F | OXYGEN SATURATION: 99 % | HEART RATE: 83 BPM | DIASTOLIC BLOOD PRESSURE: 82 MMHG | WEIGHT: 178.79 LBS | HEIGHT: 70 IN | RESPIRATION RATE: 18 BRPM

## 2025-07-05 LAB
ANION GAP SERPL CALCULATED.3IONS-SCNC: 2 MMOL/L (ref 4–13)
BASOPHILS # BLD AUTO: 0.03 THOUSANDS/ÂΜL (ref 0–0.1)
BASOPHILS NFR BLD AUTO: 1 % (ref 0–1)
BILIRUB UR QL STRIP: NEGATIVE
BILIRUB UR QL STRIP: NEGATIVE
BUN SERPL-MCNC: 14 MG/DL (ref 5–25)
CALCIUM SERPL-MCNC: 8.9 MG/DL (ref 8.4–10.2)
CHLORIDE SERPL-SCNC: 102 MMOL/L (ref 96–108)
CLARITY UR: CLEAR
CLARITY UR: CLEAR
CO2 SERPL-SCNC: 36 MMOL/L (ref 21–32)
COLOR UR: COLORLESS
COLOR UR: COLORLESS
CREAT SERPL-MCNC: 0.91 MG/DL (ref 0.6–1.3)
EOSINOPHIL # BLD AUTO: 0.52 THOUSAND/ÂΜL (ref 0–0.61)
EOSINOPHIL NFR BLD AUTO: 11 % (ref 0–6)
ERYTHROCYTE [DISTWIDTH] IN BLOOD BY AUTOMATED COUNT: 15.6 % (ref 11.6–15.1)
GFR SERPL CREATININE-BSD FRML MDRD: 88 ML/MIN/1.73SQ M
GLUCOSE SERPL-MCNC: 139 MG/DL (ref 65–140)
GLUCOSE SERPL-MCNC: 158 MG/DL (ref 65–140)
GLUCOSE SERPL-MCNC: 164 MG/DL (ref 65–140)
GLUCOSE UR STRIP-MCNC: ABNORMAL MG/DL
GLUCOSE UR STRIP-MCNC: NEGATIVE MG/DL
HCT VFR BLD AUTO: 26.9 % (ref 36.5–49.3)
HGB BLD-MCNC: 8.9 G/DL (ref 12–17)
HGB UR QL STRIP.AUTO: NEGATIVE
HGB UR QL STRIP.AUTO: NEGATIVE
IMM GRANULOCYTES # BLD AUTO: 0.02 THOUSAND/UL (ref 0–0.2)
IMM GRANULOCYTES NFR BLD AUTO: 0 % (ref 0–2)
KETONES UR STRIP-MCNC: NEGATIVE MG/DL
KETONES UR STRIP-MCNC: NEGATIVE MG/DL
LEUKOCYTE ESTERASE UR QL STRIP: NEGATIVE
LEUKOCYTE ESTERASE UR QL STRIP: NEGATIVE
LYMPHOCYTES # BLD AUTO: 1.65 THOUSANDS/ÂΜL (ref 0.6–4.47)
LYMPHOCYTES NFR BLD AUTO: 35 % (ref 14–44)
MCH RBC QN AUTO: 31.3 PG (ref 26.8–34.3)
MCHC RBC AUTO-ENTMCNC: 33.1 G/DL (ref 31.4–37.4)
MCV RBC AUTO: 95 FL (ref 82–98)
MONOCYTES # BLD AUTO: 0.1 THOUSAND/ÂΜL (ref 0.17–1.22)
MONOCYTES NFR BLD AUTO: 2 % (ref 4–12)
MTX SERPL-SCNC: 0.12 UMOL/L
MTX SERPL-SCNC: <0.1 UMOL/L
NEUTROPHILS # BLD AUTO: 2.43 THOUSANDS/ÂΜL (ref 1.85–7.62)
NEUTS SEG NFR BLD AUTO: 51 % (ref 43–75)
NITRITE UR QL STRIP: NEGATIVE
NITRITE UR QL STRIP: NEGATIVE
NRBC BLD AUTO-RTO: 0 /100 WBCS
PH UR STRIP.AUTO: 8 [PH]
PH UR STRIP.AUTO: 8.5 [PH]
PLATELET # BLD AUTO: 199 THOUSANDS/UL (ref 149–390)
PMV BLD AUTO: 10 FL (ref 8.9–12.7)
POTASSIUM SERPL-SCNC: 3.8 MMOL/L (ref 3.5–5.3)
PROT UR STRIP-MCNC: NEGATIVE MG/DL
PROT UR STRIP-MCNC: NEGATIVE MG/DL
RBC # BLD AUTO: 2.84 MILLION/UL (ref 3.88–5.62)
SODIUM SERPL-SCNC: 140 MMOL/L (ref 135–147)
SP GR UR STRIP.AUTO: 1 (ref 1–1.03)
SP GR UR STRIP.AUTO: 1.01 (ref 1–1.03)
UROBILINOGEN UR STRIP-ACNC: <2 MG/DL
UROBILINOGEN UR STRIP-ACNC: <2 MG/DL
WBC # BLD AUTO: 4.75 THOUSAND/UL (ref 4.31–10.16)

## 2025-07-05 PROCEDURE — 81003 URINALYSIS AUTO W/O SCOPE: CPT | Performed by: INTERNAL MEDICINE

## 2025-07-05 PROCEDURE — 99239 HOSP IP/OBS DSCHRG MGMT >30: CPT | Performed by: FAMILY MEDICINE

## 2025-07-05 PROCEDURE — 80048 BASIC METABOLIC PNL TOTAL CA: CPT | Performed by: FAMILY MEDICINE

## 2025-07-05 PROCEDURE — NC001 PR NO CHARGE: Performed by: INTERNAL MEDICINE

## 2025-07-05 PROCEDURE — 82948 REAGENT STRIP/BLOOD GLUCOSE: CPT

## 2025-07-05 PROCEDURE — 85025 COMPLETE CBC W/AUTO DIFF WBC: CPT | Performed by: FAMILY MEDICINE

## 2025-07-05 RX ADMIN — HEPARIN SODIUM 5000 UNITS: 5000 INJECTION INTRAVENOUS; SUBCUTANEOUS at 06:16

## 2025-07-05 RX ADMIN — QUETIAPINE FUMARATE 12.5 MG: 25 TABLET ORAL at 11:17

## 2025-07-05 RX ADMIN — SENNOSIDES 17.2 MG: 8.6 TABLET, FILM COATED ORAL at 11:17

## 2025-07-05 RX ADMIN — INSULIN LISPRO 1 UNITS: 100 INJECTION, SOLUTION INTRAVENOUS; SUBCUTANEOUS at 08:04

## 2025-07-05 RX ADMIN — HEPARIN SODIUM 5000 UNITS: 5000 INJECTION INTRAVENOUS; SUBCUTANEOUS at 13:09

## 2025-07-05 RX ADMIN — ATORVASTATIN CALCIUM 20 MG: 20 TABLET, FILM COATED ORAL at 08:03

## 2025-07-05 RX ADMIN — SODIUM BICARBONATE 150 ML/HR: 84 INJECTION, SOLUTION INTRAVENOUS at 12:20

## 2025-07-05 RX ADMIN — DOCUSATE SODIUM 100 MG: 100 CAPSULE, LIQUID FILLED ORAL at 08:03

## 2025-07-05 RX ADMIN — PANTOPRAZOLE SODIUM 40 MG: 40 TABLET, DELAYED RELEASE ORAL at 06:16

## 2025-07-05 RX ADMIN — ALLOPURINOL 300 MG: 300 TABLET ORAL at 08:03

## 2025-07-05 NOTE — DISCHARGE SUMMARY
Discharge Summary - Hospitalist   Name: Alexis Dennison 64 y.o. 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, double expressor (BCL2+/MYC+), DLBCL NOS, non-germinal center, Ki-67 60-70, BCL6 rearrangement   Has been receiving inpatient high-dose methotrexate and rituximab in outpatient temozolomide 90 mg a day on day 7-11  Admitted for initiation of cycle 5  Further management per oncology  Virtual one-to-one for safety  Supportive care with as needed medications for nausea, vomiting, pain  Pt maybe cleared for dc today     Type 2 diabetes mellitus with hyperglycemia, without long-term current use of insulin (HCC)  Lab Results   Component Value Date    HGBA1C 5.5 06/16/2025       Recent Labs     07/04/25  1546 07/04/25  2056 07/05/25  0758 07/05/25  1119   POCGLU 196* 181* 164* 139       Blood Sugar Average: Last 72 hrs:  (P) 152.5450136812526782ZWW Janumet, holding  Insulin sliding scale while inpatient  Hypoglycemia protocol    HTN (hypertension)  Not on antihypertensives PTA  BP reviewed and stable  Continue to monitor  Anemia  Normocytic  Baseline hemoglobin 8-10  Hemoglobin admission 8.5  No recent iron profile, will order  Continue to monitor and transfuse less than 7     Medical Problems       Resolved Problems  Date Reviewed: 4/14/2025   None       Discharging Physician / Practitioner: Crispin Arana MD  PCP: PATI Mckeon  Admission Date:   Admission Orders (From admission, onward)       Ordered        06/30/25 0938  INPATIENT ADMISSION  Once                          Discharge Date: 07/05/25    Next Steps for Physician/AP Assuming Care:  F/U outpatient hemeonc    Test Results Pending at Discharge (will require follow up):  None    Medication Changes for Discharge & Rationale:   See after visit summary for reconciled discharge medications provided to  patient and/or family.     Consultations During Hospital Stay:  Heme Onc    Procedures Performed:   Chemotherapy    Significant Findings / Test Results:   None    Incidental Findings:      None    Hospital Course:   Alexis Dennison is a 64 y.o. male patient who originally presented to the hospital on 6/30/2025 due to primary CNS lymphoma.  Patient received chemotherapy and was closely monitored throughout his hospitalization.  Patient had medications optimized by hematology oncology.  Patient is medically cleared at this time for discharge home.          Please see above list of diagnoses and related plan for additional information.     Discharge Day Visit / Exam:       Discussion with Family: Patient declined call to .     Discharge instructions/Information to patient and family:   See after visit summary for information provided to patient and family.      Provisions for Follow-Up Care:  See after visit summary for information related to follow-up care and any pertinent home health orders.      Mobility at time of Discharge:   Basic Mobility Inpatient Raw Score: 16  JH-HLM Goal: 5: Stand one or more mins  JH-HLM Achieved: 3: Sit at edge of bed  HLM Goal NOT achieved. Continue to encourage mobility in post discharge setting.     Disposition:   Home    Planned Readmission: None    Administrative Statements   Discharge Statement:  I have spent a total time of 40 minutes in caring for this patient on the day of the visit/encounter. >30 minutes of time was spent on: Diagnostic results, Prognosis, Instructions for management, Importance of tx compliance, Impressions, Documenting in the medical record, and Reviewing / ordering tests, medicine, procedures  .    **Please Note: This note may have been constructed using a voice recognition system**

## 2025-07-05 NOTE — PROGRESS NOTES
Progress Note - Oncology-Medical   Name: Alexis Dennison 64 y.o. male I MRN: [id number]  [facility] 9[facility] I Date of Admission: 6/30/2025   Date of Service: 7/5/2025 I Hospital Day: 5    Assessment & Plan  Primary CNS lymphoma  DLBCL NOS, non-germinal center, Ki-67 60-70% , BCL6 rearrangement  Diagnosed in 4/ 2025. Established with Dr. Phan (next appt 7/18)     On Q-14 days High-dose IV Methotrexate   C1 (4/18) 8 g/m²  C2 (5/2) 3 g/m², dose-reduced due to LETY, delayed x1 day due to E. coli bacteremia   C3 (5/21) 3 g/m²   C4 (6/17) 3 g/m²     S/p Rituximab x 6 doses 4/17/25 - 6/19/2025    On Temozolamide 90 mg a day , on days 7 - 11 (received last on 6/23 - 6/27)      PLAN   - here for high dose methotrexate cycle 5 day 1, vitals, labs, exam and ROS reviewed, okay to proceed with treatment as planned   - daily CBC and CMP, noted decreases in all cell lines similar to prior admissions  - IV Sodium Bicarb + Q6H UA as per protocol, UA PH goal > 7.0   - MTX level 24H (3.30), 48H (0.35), 72H (pending), 96H (pending), 120H (scheduled 1600 today) from MTX start time; goal before discharge < 0.05   - Q6H Leucovorin 24 hours post MTX   - on ppx allopurinol  - anticipate discharge once MTX level returns, called lab who said these were sent as these MTX levels were sent our as send out labs and indicated that the turn around time is 3-5 days with possible delay from July 4 holiday  - patient should request family bring his home temozolamide which should be taken days 7-11 beginning on 7/6 if he remains inpatient    Subjective   Interval History:   NAEON, no new complaints, 24-hour methotrexate level is therapeutic at 0.35 at 48H with no result for 72H or 96H, urine pH remains therapeutic, SCR is stable, cell counts are increasing.    Reason for consultation:  Plan cycle 5 of high-dose methotrexate     History of present illness (6/30):  Alexis Dennison is a 64 y.o. male with primary CNS (Dx 4/14/2025, double expressor  (BCL2+/MYC+), DLBCL NOS, non-germinal center, Ki-67 60-70, BCL6 rearrangement), DM2 with baseline b/l LE neuropathy, NAFLD, and HTN with his treatment course of high-dose MTX + rituximab + temozolomide patient is presenting today for inpatient planned chemotherapy as outlined above.     Objective :  Temp:  [98 °F (36.7 °C)-98.1 °F (36.7 °C)] 98.1 °F (36.7 °C)  HR:  [70-71] 71  BP: (133-142)/(69-81) 133/69  Resp:  [18] 18  SpO2:  [96 %-97 %] 96 %  O2 Device: None (Room air)    Physical Exam  - GEN: Appears well, alert and oriented x 3, pleasant and cooperative, in no acute distress  - HEENT: Anicteric, mucous membranes moist, PERRL and EOMI   - NECK: No lymphadenopathy, JVD or carotid bruits   - HEART: RRR, normal S1 and S2, no murmurs, clicks, gallops or rubs   - LUNGS: Clear to auscultation bilaterally; no wheezes, rales, or rhonchi  - ABDOMEN: Normal bowel sounds, soft, no tenderness, no distention, no organomegaly or masses felt on exam.   - EXTREMITIES: Peripheral pulses normal; no clubbing, cyanosis, or edema  - NEURO: No focal findings, CN II-XII are grossly intact.   - Musculoskeletal: 5/5 strength, normal ROM, no swollen or erythematous joints.   - SKIN: Normal without suspicious lesions on exposed skin      Lab Results: I have reviewed the following results:CBC/BMP:   .     07/05/25  0623   WBC 4.75   HGB 8.9*   HCT 26.9*      SODIUM 140   K 3.8      CO2 36*   BUN 14   CREATININE 0.91   GLUC 158*    , LFTs:   No new results in last 24 hours.     Lab Results   Component Value Date    K 3.8 07/05/2025     07/05/2025    CO2 36 (H) 07/05/2025    BUN 14 07/05/2025    CREATININE 0.91 07/05/2025    GLUF 109 (H) 06/12/2025    CALCIUM 8.9 07/05/2025    CORRECTEDCA 8.8 07/02/2025    AST 33 07/02/2025    ALT 55 (H) 07/02/2025    ALKPHOS 49 07/02/2025    EGFR 88 07/05/2025     Lab Results   Component Value Date    WBC 4.75 07/05/2025    HGB 8.9 (L) 07/05/2025    HCT 26.9 (L) 07/05/2025    MCV 95  07/05/2025     07/05/2025     Lab Results   Component Value Date    NEUTROABS 2.43 07/05/2025     Administrative Statements   I have spent a total time of 30 minutes in caring for this patient on the day of the visit/encounter including Diagnostic results, Prognosis, Risks and benefits of tx options, Instructions for management, Patient and family education, Importance of tx compliance, Risk factor reductions, Impressions, Counseling / Coordination of care, Documenting in the medical record, Reviewing/placing orders in the medical record (including tests, medications, and/or procedures), Obtaining or reviewing history  , and Communicating with other healthcare professionals .    This note has been generated by voice recognition software system. Therefore, there may be spelling, grammar, and or syntax errors. Please contact if questions arise.     .Additional recommendations to follow per attending, Dr. Ortiz.    Bryson Garcia DO, PGY5  Hematology/Oncology Fellow

## 2025-07-05 NOTE — ASSESSMENT & PLAN NOTE
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 Established with Dr. Phan (next appt 7/18)     On Q-14 days High-dose IV Methotrexate   C1 (4/18) 8 g/m²  C2 (5/2) 3 g/m², dose-reduced due to LETY, delayed x1 day due to E. coli bacteremia   C3 (5/21) 3 g/m²   C4 (6/17) 3 g/m²     S/p Rituximab x 6 doses 4/17/25 - 6/19/2025    On Temozolamide 90 mg a day , on days 7 - 11 (received last on 6/23 - 6/27)      PLAN   - here for high dose methotrexate cycle 5 day 1, vitals, labs, exam and ROS reviewed, okay to proceed with treatment as planned   - daily CBC and CMP, noted decreases in all cell lines similar to prior admissions  - IV Sodium Bicarb + Q6H UA as per protocol, UA PH goal > 7.0   - MTX level 24H (3.30), 48H (0.35), 72H (pending), 96H (pending), 120H (scheduled 1600 today) from MTX start time; goal before discharge < 0.05   - Q6H Leucovorin 24 hours post MTX   - on ppx allopurinol  - anticipate discharge once MTX level returns, called lab who said these were sent as these MTX levels were sent our as send out labs and indicated that the turn around time is 3-5 days with possible delay from July 4 holiday  - patient should request family bring his home temozolamide which should be taken days 7-11 beginning on 7/6 if he remains inpatient

## 2025-07-05 NOTE — ASSESSMENT & PLAN NOTE
Lab Results   Component Value Date    HGBA1C 5.5 06/16/2025       Recent Labs     07/04/25  1546 07/04/25 2056 07/05/25  0758 07/05/25  1119   POCGLU 196* 181* 164* 139       Blood Sugar Average: Last 72 hrs:  (P) [id number] Janumet, holding  Insulin sliding scale while inpatient  Hypoglycemia protocol

## 2025-07-05 NOTE — PLAN OF CARE
Problem: Prexisting or High Potential for Compromised Skin Integrity  Goal: Skin integrity is maintained or improved  Description: INTERVENTIONS:  - Identify patients at risk for skin breakdown  - Assess and monitor skin integrity including under and around medical devices   - Assess and monitor nutrition and hydration status  - Monitor labs  - Assess for incontinence   - Turn and reposition patient  - Assist with mobility/ambulation  - Relieve pressure over antonieta prominences   - Avoid friction and shearing  - Provide appropriate hygiene as needed including keeping skin clean and dry  - Evaluate need for skin moisturizer/barrier cream  - Collaborate with interdisciplinary team  - Patient/family teaching  - Consider wound care consult    Assess:  - Review [place] scale daily  - Inspect skin when repositioning, toileting, and assisting with ADLS  - Assess extremities for adequate circulation and sensation     Bed Management:  - Have minimal linens on bed & keep smooth, unwrinkled  - Change linens as needed when moist or perspiring  - Avoid sitting or lying in one position  - Toileting:  - Offer bedside commode  - Assess for incontinence  - Use incontinent care products after each incontinent episode    Activity:  - Mobilize patient  - Encourage activity and walks on unit  - Encourage or provide ROM exercises   - Turn and reposition patient  - Use appropriate equipment to lift or move patient in bed  - Instruct/ Assist with weight shifting     Skin Care:  - Avoid use of baby powder, tape, friction and shearing, hot water or constrictive clothing  - Relieve pressure over bony prominences   - Do not massage red bony areas  Outcome: Progressing     Problem: INFECTION - ADULT  Goal: Absence or prevention of progression during hospitalization  Description: INTERVENTIONS:  - Assess and monitor for signs and symptoms of infection  - Monitor lab/diagnostic results  - Monitor all insertion sites, i.e. indwelling lines, tubes,  and drains  - Monitor endotracheal if appropriate and nasal secretions for changes in amount and color  - [place] appropriate cooling/warming therapies per order  - Administer medications as ordered  - Instruct and encourage patient and family to use good hand hygiene technique  - Identify and instruct in appropriate isolation precautions for identified infection/condition  Outcome: Progressing

## 2025-07-05 NOTE — PLAN OF CARE
Problem: Prexisting or High Potential for Compromised Skin Integrity  Goal: Skin integrity is maintained or improved  Description: INTERVENTIONS:  - Identify patients at risk for skin breakdown  - Assess and monitor skin integrity including under and around medical devices   - Assess and monitor nutrition and hydration status  - Monitor labs  - Assess for incontinence   - Turn and reposition patient  - Assist with mobility/ambulation  - Relieve pressure over antonieta prominences   - Avoid friction and shearing  - Provide appropriate hygiene as needed including keeping skin clean and dry  - Evaluate need for skin moisturizer/barrier cream  - Collaborate with interdisciplinary team  - Patient/family teaching  - Consider wound care consult    Assess:  - Review [place] scale daily  - Inspect skin when repositioning, toileting, and assisting with ADLS  - Assess extremities for adequate circulation and sensation     Bed Management:  - Have minimal linens on bed & keep smooth, unwrinkled  - Change linens as needed when moist or perspiring  - Avoid sitting or lying in one position  - Toileting:  - Offer bedside commode  - Assess for incontinence  - Use incontinent care products after each incontinent episode    Activity:  - Mobilize patient  - Encourage activity and walks on unit  - Encourage or provide ROM exercises   - Turn and reposition patient  - Use appropriate equipment to lift or move patient in bed  - Instruct/ Assist with weight shifting     Skin Care:  - Avoid use of baby powder, tape, friction and shearing, hot water or constrictive clothing  - Relieve pressure over bony prominences   - Do not massage red bony areas  Outcome: Progressing     Problem: INFECTION - ADULT  Goal: Absence or prevention of progression during hospitalization  Description: INTERVENTIONS:  - Assess and monitor for signs and symptoms of infection  - Monitor lab/diagnostic results  - Monitor all insertion sites, i.e. indwelling lines, tubes,  and drains  - Monitor endotracheal if appropriate and nasal secretions for changes in amount and color  - [place] appropriate cooling/warming therapies per order  - Administer medications as ordered  - Instruct and encourage patient and family to use good hand hygiene technique  - Identify and instruct in appropriate isolation precautions for identified infection/condition  Outcome: Progressing  Goal: Absence of fever/infection during neutropenic period  Description: INTERVENTIONS:  - Monitor WBC  - Perform strict hand hygiene  - Limit to healthy visitors only  - No plants, dried, fresh or silk flowers with carrion in patient room  Outcome: Progressing     Problem: SAFETY ADULT  Goal: Patient will remain free of falls  Description: INTERVENTIONS:  - Educate patient/family on patient safety including physical limitations  - Instruct patient to call for assistance with activity   - Consider consulting OT/PT to assist with strengthening/mobility based on AM PAC & JH-HLM score  - Consult OT/PT to assist with strengthening/mobility   - Keep Call bell within reach  - Keep bed low and locked with side rails adjusted as appropriate  - Keep care items and personal belongings within reach  - Initiate and maintain comfort rounds  - Make Fall Risk Sign visible to staff  - Offer Toileting every 2 Hours, in advance of need  - Initiate/Maintain alarm  - Obtain necessary fall risk management equipment:   - Apply yellow socks and bracelet for high fall risk patients  - Consider moving patient to room near nurses station  Outcome: Progressing     Problem: DISCHARGE PLANNING  Goal: Discharge to home or other facility with appropriate resources  Description: INTERVENTIONS:  - Identify barriers to discharge w/patient and caregiver  - Arrange for needed discharge resources and transportation as appropriate  - Identify discharge learning needs (meds, wound care, etc.)  - Arrange for interpretive services to assist at discharge as needed  -  Refer to Case Management Department for coordinating discharge planning if the patient needs post-hospital services based on physician/advanced practitioner order or complex needs related to functional status, cognitive ability, or social support system  Outcome: Progressing

## 2025-07-05 NOTE — ASSESSMENT & PLAN NOTE
Diagnosed on 4/14/2025, double expressor (BCL2+/MYC+), DLBCL NOS, non-germinal center, Ki-67 60-70, BCL6 rearrangement   Has been receiving inpatient high-dose methotrexate and rituximab in outpatient temozolomide 90 mg a day on day 7-11  Admitted for initiation of cycle 5  Further management per oncology  Virtual one-to-one for safety  Supportive care with as needed medications for nausea, vomiting, pain  Pt maybe cleared for dc today

## 2025-07-07 ENCOUNTER — TELEPHONE (OUTPATIENT)
Age: 65
End: 2025-07-07

## 2025-07-07 NOTE — UTILIZATION REVIEW
NOTIFICATION OF ADMISSION DISCHARGE   This is a Notification of Discharge from [facility]. Please be advised that this patient has been discharge from our facility. Below you will find the admission and discharge date and time including the patient’s disposition.   UTILIZATION REVIEW CONTACT:  Utilization Review Assistants  Network Utilization Review Department  Phone: [phone] x carefully listen to the prompts. All voicemails are confidential.  Email: [email or web address]     ADMISSION INFORMATION  PRESENTATION DATE: 6/30/2025  8:18 AM  OBERVATION ADMISSION DATE: N/A  INPATIENT ADMISSION DATE: 6/30/25  8:18 AM   DISCHARGE DATE: 7/5/2025  5:58 PM   DISPOSITION:Home/Self Care    Network Utilization Review Department  ATTENTION: Please call with any questions or concerns to [phone] and carefully listen to the prompts so that you are directed to the right person. All voicemails are confidential.   For Discharge needs, contact Care Management DC Support Team at [phone] opt. 2  Send all requests for admission clinical reviews, approved or denied determinations and any other requests to dedicated fax number below belonging to the campus where the patient is receiving treatment. List of dedicated fax numbers for the Facilities:  FACILITY NAME UR FAX NUMBER   ADMISSION DENIALS (Administrative/Medical Necessity) [phone]   DISCHARGE SUPPORT TEAM ([facility]) [phone]   PARENT CHILD HEALTH (Maternity/NICU/Pediatrics) [phone]   [facility] [phone]   [facility] [phone]   [facility] [phone]   [facility] [phone]   [facility] [phone]   [facility] [phone]   [facility] [phone]   [facility] [phone]   [facility]  [facility] [phone]   [facility] [phone]   [facility] [phone]   [facility] ([place], [place]) [phone]

## 2025-07-07 NOTE — TELEPHONE ENCOUNTER
Spoke to Alexis 7/3/25 letting him know the FMLA forms were completed and would he like them faxed out. Patient stated they can be faxed back to employer.    FMLA forms completed and faxed out 7/3/25 to Fax #[phone] along with last office note, Treatment plan and calendar.  Received faxed confirmation.

## 2025-07-08 ENCOUNTER — EVALUATION (OUTPATIENT)
Dept: SPEECH THERAPY | Facility: CLINIC | Age: 65
End: 2025-07-08
Attending: NURSE PRACTITIONER
Payer: COMMERCIAL

## 2025-07-08 ENCOUNTER — HOSPITAL ENCOUNTER (OUTPATIENT)
Dept: ULTRASOUND IMAGING | Facility: HOSPITAL | Age: 65
Discharge: HOME | End: 2025-07-08
Attending: NURSE PRACTITIONER
Payer: COMMERCIAL

## 2025-07-08 ENCOUNTER — EVALUATION (OUTPATIENT)
Facility: CLINIC | Age: 65
End: 2025-07-08
Payer: COMMERCIAL

## 2025-07-08 DIAGNOSIS — R41.841 COGNITIVE COMMUNICATION DEFICIT: ICD-10-CM

## 2025-07-08 DIAGNOSIS — C83.390 PRIMARY CNS LYMPHOMA: Primary | ICD-10-CM

## 2025-07-08 DIAGNOSIS — R26.2 AMBULATORY DYSFUNCTION: ICD-10-CM

## 2025-07-08 DIAGNOSIS — G96.9 CNS LESION: Primary | ICD-10-CM

## 2025-07-08 DIAGNOSIS — E04.1 THYROID NODULE: ICD-10-CM

## 2025-07-08 DIAGNOSIS — C83.390 PRIMARY CENTRAL NERVOUS SYSTEM (CNS) LYMPHOMA: Primary | ICD-10-CM

## 2025-07-08 DIAGNOSIS — C83.390 CNS LYMPHOMA: ICD-10-CM

## 2025-07-08 PROCEDURE — 88173 CYTOPATH EVAL FNA REPORT: CPT | Performed by: PATHOLOGY

## 2025-07-08 PROCEDURE — 97163 PT EVAL HIGH COMPLEX 45 MIN: CPT

## 2025-07-08 PROCEDURE — 96125 COGNITIVE TEST BY HC PRO: CPT

## 2025-07-08 PROCEDURE — 88172 CYTP DX EVAL FNA 1ST EA SITE: CPT | Performed by: PATHOLOGY

## 2025-07-08 PROCEDURE — 10005 FNA BX W/US GDN 1ST LES: CPT

## 2025-07-08 PROCEDURE — 97110 THERAPEUTIC EXERCISES: CPT

## 2025-07-08 RX ORDER — LIDOCAINE HYDROCHLORIDE 10 MG/ML
5 INJECTION, SOLUTION EPIDURAL; INFILTRATION; INTRACAUDAL; PERINEURAL ONCE
Status: COMPLETED | OUTPATIENT
Start: 2025-07-08 | End: 2025-07-08

## 2025-07-08 RX ADMIN — LIDOCAINE HYDROCHLORIDE 5 ML: 10 INJECTION, SOLUTION EPIDURAL; INFILTRATION; INTRACAUDAL at 14:33

## 2025-07-08 NOTE — LETTER
2025    PATI Mckeon  [address] [facility] [place] [address]    Patient: Alexis Dennison   YOB: 1960   Date of Visit: 2025     Encounter Diagnosis     ICD-10-CM    1. 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 Ambulatory dysfunction  R26.2           Dear PATI Hinkle:    Thank you for your recent referral of Alexis Dennison. Please review the attached evaluation summary from Alexis's recent visit.     Please verify that you agree with the plan of care by signing the attached order.     If you have any questions or concerns, please do not hesitate to call.     I sincerely appreciate the opportunity to share in the care of one of your patients and hope to have another opportunity to work with you in the near future.       Sincerely,    Des Chandler, PT      Referring Provider:      I certify that I have read the below Plan of Care and certify the need for these services furnished under this plan of treatment while under my care.                    PATI Mckeon  [address] [facility]  [place] [address]  Via Fax: [phone]          PT Evaluation     Today's date: 2025  Patient name: Alexis Dennison  : 1960  MRN: [id number]  Referring provider: Evita Lee CRNP  Dx:   Encounter Diagnosis     ICD-10-CM    1. 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 Ambulatory dysfunction  R26.2           Start Time: 0845  Stop Time: 0930  Total time in clinic (min): 45 minutes    Assessment  Impairments: abnormal coordination, abnormal gait, abnormal muscle tone, abnormal or restricted ROM, abnormal movement, activity intolerance, impaired balance, impaired physical strength, lacks appropriate home exercise program, safety issue and weight-bearing intolerance    Assessment details: Patient is a 64 y.o. year old male presenting to OPPT s/p diagnosis of CNS lymphoma.  Patient demonstrates decreased strength, endurance, balance, and functional  independence.  Pt ambulates at gait speed of 1.23 m/s, classifying them as a unlimited community ambulator. Per the 5xSTS they show reduced LE strength and power, 14.71 seconds, reduced LE endurance per 30s chair rise test. 6MWT reveals reduced endurance, 1280 ft - reduced vs age matched norms. Demonstrates good balance per FGA, scoring 27/30.   Given written HEP with focus on LE strength and daily walks to improve his endurance. He will benefit from skilled PT interventions to maximize return to PLOF and independence as able.  Thank you for this referral and the ability to participate in the care of this patient.       Prognosis: good    Goals  In 4 weeks, patient will:  1.  Improve 5xSTS by at least 2 seconds to show improvement in LE power.  2.  Demonstrate good carryover to perform simple HEP + walking program    In 4-10 weeks, patient will:  1.  Demonstrate consistent carryover with HEP and walking program.  2.  Report improvement in ability to mow the lawn while taking less breaks  3.  Improve 6MWT by at least 150ft to demonstrate improvement in ability in community ambulation and meet MCID value.  4.  Improve 30s chair stand by at least 4 stands to show improved LE endurance and strength  5.  Improve subjective stability while walking after 20 minutes to reduced falls risk        Plan  Patient would benefit from: skilled physical therapy    Planned therapy interventions: neuromuscular re-education, manual therapy, patient education, home exercise program, therapeutic exercise, therapeutic activities and gait training    Frequency: 2x week  Duration in weeks: 8  Plan of Care beginning date: 7/8/2025  Plan of Care expiration date: 9/6/2025  Treatment plan discussed with: patient        Subjective Evaluation    History of Present Illness  Mechanism of injury: 7/8: His doctor told him to come here. He was having physical therapy at the home. His significant other provides some history. His significant other notes  he cannot mow the lawn like he used to he needs to take more breaks where as previously he did not. He may experience difficulyt with things like stairs or getting in/out of a car. If he is walking for like 20 minutes she notices his balance may be a little off. Needs to hold onto things when picking them up from the ground.     Summary of [facility] stay: During the course of the patient's stay in [facility], he had a reoccurrence of E.coli bacteremia and sepsis from a UTI.  He was placed on Ceftriaxone then to Duricef.  He will complete the Duricef on  for a total of 14 days of antibiotic treatment.  At home, he takes Losartan HCT but he did not need to have that restarted here or for home at discharge.  He had transient transaminitis, thrombocytopenia and hyponatremia all of which resolved.       For his CNS lymphoma, while in [facility], he received Methotrexate on 25 and Rituxan 5/10 and .  He got a dose of Granix today 25 and will be receiving a dose tomorrow  and .  He was followed by Nephrology while here.  Creatinine did rise to 1.38.  He was given IV hydration.  Creatinine on  was back down to 1.0.     He will need an ultrasound of the thyroid to evaluate a nodule.  This will need to be set up by his PCP.  He also has a 4mm RLL lung nodule and a liver lesion which will need future followup as well.     Patient was discharged to home with wife in stable condition on 25.    Patient Goals  Patient goals for therapy: increased strength, independence with ADLs/IADLs and improved balance    Pain  No pain reported    Social Support  Lives in: multiple-level home  Lives with: spouse    Treatments  Previous treatment: physical therapy and speech therapy        Objective    Balance Test    6 Minute Walk Test (ft): 1280ft   FGA: 27   Gait Speed (m/s): 1.23m/s   5x Sit To Stand (s): 14.71   30s chair rise 10   TU.10   ABC: 95   Pre session BP: LUE seated: 127/75    MCTSIB Number of Seconds   Feet  Together, Eyes Open 30   Feet Together, Eyes Closed 30   Feet Together, Eyes Open Foam 30   Feet Together, Eyes Closed Foam 30       Sensation Left Right   Kinesthesia intact intact   Light Touch intact intact   Sharp/Dull     2 Point Discrimination         Gait Assessment: No overt gait deviations            Daily Treatment Diary     Precautions: undergone chemo,   CO-MORBIDITIES: CNS lymphoma, T2DM, anemia  HEP ACCESS CODE:     POC Expires Reeval for Medicare to be completed  Unit Limit Auth Expiration Date PT/OT/STVisit Limit   9/6 By visit na bomn 12/31 30    Completed on visit                    Auth Status DATE 7/8        approved Visit # 1         Remaining 29        Testing         6MWT 1280ft        30s chair stand/5xSTS 14.71 / 10        TUG/TUGC 11.10        FGA/MiniBest 27        10mWT 1.23                 THERAPEUTIC EXERCISE HEP         Mini squat Squats, lat step up, step up, daily walking         SLRx4          HR/TR          Leg Press                    NEUROMUSCULAR REEDUCATION           Dynamic balance          Static balance          Obstacle course          Resisted stepping          Perturbation training                                                                                                    THERAPEUTIC ACTIVITY                                                  GAIT TRAINING          Stairs          No AD/SPC                              Manuals

## 2025-07-08 NOTE — PROGRESS NOTES
Speech-Language Pathology Initial Evaluation    Today's date: 2025   Patient’s name: Alexis Dennison  : 1960  MRN: [id number]  Safety measures:   Referring provider: Evita Lee CRNP    Encounter Diagnosis     ICD-10-CM    1. CNS lesion  G96.9       2. Cognitive communication deficit  R41.841             Assessment:  Patient presents with extremely low scoring in cognition per RBANS-Form A with results as listed below.  Recommend: Cognitive services focused on education and cognitive retraining.       Short Term    Patient will be educated on word finding strategies (i.e., circumlocution) for improved generative naming and verbal expression skills.    Patient will complete complex auditory attention processing tasks (e.g., sentence unscramble, ranking numbers/words, etc.) to improve working memory with 80% accuracy.    Patient will facilitate planning by completing thought organization tasks (e.g., sequencing, deduction puzzles, etc.) with 80% accuracy to facilitate increased executive functioning, working memory, problem solving, and processing skills.    Patient will complete auditory immediate and short term memory tasks to 80% accuracy to facilitate increased ability to retell narratives and recall information within functional living environment.    To target mental manipulation and working memory, patient will participate in word finding activity (i.e., anagrams) with 80% accuracy.    Patient will answer questions regarding story read aloud with 80% accuracy to facilitate improved auditory comprehension and recall.    Patient will complete reading comprehension tasks (e.g., answering questions, following complex written directions, etc.) to 80% accuracy to facilitate carryover of comprehension of functional reading materials.    Patient will demonstrate divided attention by responding to multiple tasks or details within tasks at the same time with min cues in a distracting environment with 80%  accuracy.    Patient will complete concrete and abstract categorization tasks to 80% accuracy to facilitate improved generative naming skills and working memory.    Patient will generate sentences and short paragraphs (e.g., sentence generation given words to incorporate, description of pictures, etc.) with fewer than 3 errors in grammar, organization, spelling and content with 80% accuracy to facilitate increased carryover of skills into functional living environment.    Patient will be educated on the use of internal and external memory aids and compensatory strategies with 80% accuracy to facilitate increased recall of routine, personal information, and recent events.        Long Term      Patient will complete cognitive-linguistic therapy that addresses patient's specific deficits in processing speed, short-term working memory, attention to detail, monitoring, sequencing, and organization skills, with instruction, to alleviate effects of executive functioning disorder deficits by discharge.      Patient will develop functional, cognitive-linguistic based skills and utilize compensatory strategies as needed to communicate effectively to different conversational partners, maintain safety, and participate socially by discharge.     Patient will improve ability to facilitate cognitive function and communication skills including use of compensatory strategies in a variety of functional living tasks to improve quality of like and to maximize level of independence.       Plan:  Patient would benefit from outpatient skilled Speech Therapy services: Speech-language therapy and Cognitive-linguistic therapy    Frequency: 2x weekly  Duration: 6-8 weeks    Intervention certification from: 7/8/2025  Intervention certification to: 9/2/2025      Subjective:  History of present illness: Patient is a 64 y.o. male who was referred to outpatient skilled Speech Therapy services for a cognitive-linguistic evaluation.     Patient's  "goal(s): \"To try to get back to where I was..\"    Pain: Absent 0    Hearing: Decreased  Vision: WFL    Home environment/lifestyle: lives with wife  Highest level of education: Bachelor's degree  Vocational status: working full time as       Objective:  The Repeatable Battery for the Assessment of Neuropsychological Status (RBANS) is a brief, individually-administered assessment which measures attention, language, visuospatial/constructional abilities, and immediate & delayed memory. The RBANS is intended for use with adolescents to adults, ages 12 to 89 years. The following results were obtained during the administration of the assessment.    Form: A    Cognitive Domain/Subtest: Index Score: Percentile Rank: Classification:   IMMEDIATE MEMORY 49 <.1%ile EXTREMELY LOW        1. List Learning (12/40)        2. Story Memory (7/24)       VISUOSPATIAL/  CONSTRUCTIONAL 131 98%ile VERY SUPERIOR        3. Figure Copy (20/20)        4. Line Orientation (20/20)       LANGUAGE 78 7%ile BORDERLINE        5. Picture Naming (9/10)        6. Semantic Fluency (6/40)       ATTENTION 94 34%ile AVERAGE        7. Digit Span (13/16)        8. Coding (25/89)       DELAYED MEMORY 11 %ile EXTREMELY LOW        9. List Recall (0/10)        10. List Recognition (11/20) Answered \"no\" for all of them except for first one.         11. Story Recall (0/12)        12. Figure Recall (8/20)         Sum of Index Scores:  363   Total Score:  65   Percentile: 1%ile   Classification: EXTREMELY LOW       *Patient named 6 concrete category members in 60 sec (norm=15+). -- BELOW AVERAGE        Treatment:  Education provided      Visit Tracking:  POC   Expires Auth Expiration Date ST Visit Limit   9/2/25  BOMN          Visit/Unit Tracking:  Auth Status Date 7/8   BOMN Used 1    Remaining        Intervention Comments:  Review results.    "

## 2025-07-08 NOTE — PROGRESS NOTES
PT Evaluation     Today's date: 2025  Patient name: Alexis Dennison  : 1960  MRN: [id number]  Referring provider: Evita Lee CRNP  Dx:   Encounter Diagnosis     ICD-10-CM    1. 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 Ambulatory dysfunction  R26.2           Start Time: 0845  Stop Time: 0930  Total time in clinic (min): 45 minutes    Assessment  Impairments: abnormal coordination, abnormal gait, abnormal muscle tone, abnormal or restricted ROM, abnormal movement, activity intolerance, impaired balance, impaired physical strength, lacks appropriate home exercise program, safety issue and weight-bearing intolerance    Assessment details: Patient is a 64 y.o. year old male presenting to OPPT s/p diagnosis of CNS lymphoma.  Patient demonstrates decreased strength, endurance, balance, and functional independence.  Pt ambulates at gait speed of 1.23 m/s, classifying them as a unlimited community ambulator. Per the 5xSTS they show reduced LE strength and power, 14.71 seconds, reduced LE endurance per 30s chair rise test. 6MWT reveals reduced endurance, 1280 ft - reduced vs age matched norms. Demonstrates good balance per FGA, scoring 27/30.   Given written HEP with focus on LE strength and daily walks to improve his endurance. He will benefit from skilled PT interventions to maximize return to PLOF and independence as able.  Thank you for this referral and the ability to participate in the care of this patient.       Prognosis: good    Goals  In 4 weeks, patient will:  1.  Improve 5xSTS by at least 2 seconds to show improvement in LE power.  2.  Demonstrate good carryover to perform simple HEP + walking program    In 4-10 weeks, patient will:  1.  Demonstrate consistent carryover with HEP and walking program.  2.  Report improvement in ability to mow the lawn while taking less breaks  3.  Improve 6MWT by at least 150ft to demonstrate improvement in ability in community ambulation and  meet MCID value.  4.  Improve 30s chair stand by at least 4 stands to show improved LE endurance and strength  5.  Improve subjective stability while walking after 20 minutes to reduced falls risk        Plan  Patient would benefit from: skilled physical therapy    Planned therapy interventions: neuromuscular re-education, manual therapy, patient education, home exercise program, therapeutic exercise, therapeutic activities and gait training    Frequency: 2x week  Duration in weeks: 8  Plan of Care beginning date: 7/8/2025  Plan of Care expiration date: 9/6/2025  Treatment plan discussed with: patient        Subjective Evaluation    History of Present Illness  Mechanism of injury: 7/8: His doctor told him to come here. He was having physical therapy at the home. His significant other provides some history. His significant other notes he cannot mow the lawn like he used to he needs to take more breaks where as previously he did not. He may experience difficulyt with things like stairs or getting in/out of a car. If he is walking for like 20 minutes she notices his balance may be a little off. Needs to hold onto things when picking them up from the ground.     Summary of [facility] stay: During the course of the patient's stay in [facility], he had a reoccurrence of E.coli bacteremia and sepsis from a UTI.  He was placed on Ceftriaxone then to Duricef.  He will complete the Duricef on 5/30 for a total of 14 days of antibiotic treatment.  At home, he takes Losartan HCT but he did not need to have that restarted here or for home at discharge.  He had transient transaminitis, thrombocytopenia and hyponatremia all of which resolved.       For his CNS lymphoma, while in [facility], he received Methotrexate on 5/21/25 and Rituxan 5/10 and 5/23.  He got a dose of Granix today 5/28/25 and will be receiving a dose tomorrow 5/29 and 5/30.  He was followed by Nephrology while here.  Creatinine did rise to 1.38.  He was given IV hydration.   Creatinine on  was back down to 1.0.     He will need an ultrasound of the thyroid to evaluate a nodule.  This will need to be set up by his PCP.  He also has a 4mm RLL lung nodule and a liver lesion which will need future followup as well.     Patient was discharged to home with wife in stable condition on 25.    Patient Goals  Patient goals for therapy: increased strength, independence with ADLs/IADLs and improved balance    Pain  No pain reported    Social Support  Lives in: multiple-level home  Lives with: spouse    Treatments  Previous treatment: physical therapy and speech therapy        Objective    Balance Test    6 Minute Walk Test (ft): 1280ft   FGA: 27   Gait Speed (m/s): 1.23m/s   5x Sit To Stand (s): 14.71   30s chair rise 10   TU.10   ABC: 95   Pre session BP: LUE seated: 127/75    MCTSIB Number of Seconds   Feet Together, Eyes Open 30   Feet Together, Eyes Closed 30   Feet Together, Eyes Open Foam 30   Feet Together, Eyes Closed Foam 30       Sensation Left Right   Kinesthesia intact intact   Light Touch intact intact   Sharp/Dull     2 Point Discrimination         Gait Assessment: No overt gait deviations            Daily Treatment Diary     Precautions: undergone chemo,   CO-MORBIDITIES: CNS lymphoma, T2DM, anemia  HEP ACCESS CODE:     POC Expires Reeval for Medicare to be completed  Unit Limit Auth Expiration Date PT/OT/STVisit Limit    By visit jose grimaldo  30    Completed on visit                    Auth Status DATE         approved Visit # 1         Remaining 29        Testing         6MWT 1280ft        30s chair stand/5xSTS 14.71 / 10        TUG/TUGC 11.10        FGA/MiniBest 27        10mWT 1.23                 THERAPEUTIC EXERCISE HEP         Mini squat Squats, lat step up, step up, daily walking         SLRx4          HR/TR          Leg Press                    NEUROMUSCULAR REEDUCATION           Dynamic balance          Static balance          Obstacle course           Resisted stepping          Perturbation training                                                                                                    THERAPEUTIC ACTIVITY                                                  GAIT TRAINING          Stairs          No AD/SPC                              Manuals

## 2025-07-09 ENCOUNTER — HOSPITAL ENCOUNTER (OUTPATIENT)
Dept: NON INVASIVE DIAGNOSTICS | Facility: HOSPITAL | Age: 65
Discharge: HOME | End: 2025-07-09
Attending: NURSE PRACTITIONER
Payer: COMMERCIAL

## 2025-07-09 VITALS
WEIGHT: 178 LBS | HEART RATE: 70 BPM | DIASTOLIC BLOOD PRESSURE: 82 MMHG | HEIGHT: 70 IN | BODY MASS INDEX: 25.48 KG/M2 | SYSTOLIC BLOOD PRESSURE: 138 MMHG

## 2025-07-09 DIAGNOSIS — R60.0 LOCALIZED EDEMA: ICD-10-CM

## 2025-07-09 DIAGNOSIS — C83.390 PRIMARY CNS LYMPHOMA: Primary | ICD-10-CM

## 2025-07-09 LAB
AORTIC ROOT: 3.4 CM
AORTIC VALVE MEAN VELOCITY: 8.5 M/S
ASCENDING AORTA: 3.3 CM
AV LVOT MEAN GRADIENT: 2 MMHG
AV LVOT PEAK GRADIENT: 4 MMHG
AV MEAN PRESS GRAD SYS DOP V1V2: 3 MMHG
AV PEAK GRADIENT: 7 MMHG
AV VELOCITY RATIO: 0.83
AV VMAX SYS DOP: 1.31 M/S
BSA FOR ECHO PROCEDURE: 1.99 M2
DOP CALC AO VTI: 24.02 CM
DOP CALC LVOT PEAK VEL VTI: 20 CM
DOP CALC LVOT PEAK VEL: 1 M/S
DOP CALC MV VTI: 26.35 CM
E WAVE DECELERATION TIME: 182 MS
E/A RATIO: 1.18
FRACTIONAL SHORTENING: 49 (ref 28–44)
GLOBAL LONGITUIDAL STRAIN: -18 %
INTERVENTRICULAR SEPTUM IN DIASTOLE (PARASTERNAL SHORT AXIS VIEW): 1.2 CM
INTERVENTRICULAR SEPTUM: 1.2 CM (ref 0.6–1.1)
LAAS-AP2: 19.6 CM2
LAAS-AP4: 17.2 CM2
LEFT ATRIUM SIZE: 3.3 CM
LEFT ATRIUM VOLUME (MOD BIPLANE): 51 ML
LEFT ATRIUM VOLUME INDEX (MOD BIPLANE): 25.6 ML/M2
LEFT INTERNAL DIMENSION IN SYSTOLE: 2.1 CM (ref 2.1–4)
LEFT VENTRICLE DIASTOLIC VOLUME (MOD BIPLANE): 112 ML
LEFT VENTRICLE DIASTOLIC VOLUME INDEX (MOD BIPLANE): 56.3 ML/M2
LEFT VENTRICLE SYSTOLIC VOLUME (MOD BIPLANE): 43 ML
LEFT VENTRICLE SYSTOLIC VOLUME INDEX (MOD BIPLANE): 21.6 ML/M2
LEFT VENTRICULAR INTERNAL DIMENSION IN DIASTOLE: 4.1 CM (ref 3.5–6)
LEFT VENTRICULAR POSTERIOR WALL IN END DIASTOLE: 0.8 CM
LEFT VENTRICULAR STROKE VOLUME: 59 ML
LV EF BIPLANE MOD: 62 %
LV EF US.2D.A4C+ESTIMATED: 65 %
LVSV (TEICH): 59 ML
MV E'TISSUE VEL-LAT: 13 CM/S
MV E'TISSUE VEL-SEP: 10 CM/S
MV MEAN GRADIENT: 1 MMHG
MV PEAK A VEL: 0.74 M/S
MV PEAK E VEL: 87 CM/S
MV PEAK GRADIENT: 3 MMHG
MV STENOSIS PRESSURE HALF TIME: 53 MS
MV VALVE AREA P 1/2 METHOD: 4.15
RIGHT ATRIAL 2D VOLUME: 40 ML
RIGHT ATRIUM AREA SYSTOLE A4C: 15.8 CM2
RIGHT VENTRICLE ID DIMENSION: 4.2 CM
SL CV LEFT ATRIUM LENGTH A2C: 5.6 CM
SL CV LV EF: 60
SL CV PED ECHO LEFT VENTRICLE DIASTOLIC VOLUME (MOD BIPLANE) 2D: 74 ML
SL CV PED ECHO LEFT VENTRICLE SYSTOLIC VOLUME (MOD BIPLANE) 2D: 15 ML
TRICUSPID ANNULAR PLANE SYSTOLIC EXCURSION: 2.4 CM

## 2025-07-09 PROCEDURE — 93306 TTE W/DOPPLER COMPLETE: CPT

## 2025-07-09 PROCEDURE — 93306 TTE W/DOPPLER COMPLETE: CPT | Performed by: INTERNAL MEDICINE

## 2025-07-09 RX ORDER — SODIUM BICARBONATE 650 MG/1
650 TABLET ORAL 4 TIMES DAILY
Qty: 8 TABLET | Refills: 0 | Status: SHIPPED | OUTPATIENT
Start: 2025-07-12 | End: 2025-07-19

## 2025-07-10 ENCOUNTER — APPOINTMENT (OUTPATIENT)
Dept: LAB | Facility: HOSPITAL | Age: 65
End: 2025-07-10
Payer: COMMERCIAL

## 2025-07-10 ENCOUNTER — OFFICE VISIT (OUTPATIENT)
Dept: SPEECH THERAPY | Facility: CLINIC | Age: 65
End: 2025-07-10
Attending: NURSE PRACTITIONER
Payer: COMMERCIAL

## 2025-07-10 ENCOUNTER — OFFICE VISIT (OUTPATIENT)
Facility: CLINIC | Age: 65
End: 2025-07-10
Payer: COMMERCIAL

## 2025-07-10 DIAGNOSIS — C83.390 PRIMARY CNS LYMPHOMA: ICD-10-CM

## 2025-07-10 DIAGNOSIS — C83.390 PRIMARY CENTRAL NERVOUS SYSTEM (CNS) LYMPHOMA: Primary | ICD-10-CM

## 2025-07-10 DIAGNOSIS — R41.841 COGNITIVE COMMUNICATION DEFICIT: ICD-10-CM

## 2025-07-10 DIAGNOSIS — G96.9 CNS LESION: Primary | ICD-10-CM

## 2025-07-10 DIAGNOSIS — R26.2 AMBULATORY DYSFUNCTION: ICD-10-CM

## 2025-07-10 LAB
ALBUMIN SERPL BCG-MCNC: 3.9 G/DL (ref 3.5–5)
ALP SERPL-CCNC: 69 U/L (ref 34–104)
ALT SERPL W P-5'-P-CCNC: 24 U/L (ref 7–52)
ANION GAP SERPL CALCULATED.3IONS-SCNC: 6 MMOL/L (ref 4–13)
AST SERPL W P-5'-P-CCNC: 13 U/L (ref 13–39)
BASOPHILS # BLD AUTO: 0.03 THOUSANDS/ÂΜL (ref 0–0.1)
BASOPHILS NFR BLD AUTO: 1 % (ref 0–1)
BILIRUB SERPL-MCNC: 0.51 MG/DL (ref 0.2–1)
BILIRUB UR QL STRIP: NEGATIVE
BUN SERPL-MCNC: 19 MG/DL (ref 5–25)
CALCIUM SERPL-MCNC: 9.1 MG/DL (ref 8.4–10.2)
CHLORIDE SERPL-SCNC: 103 MMOL/L (ref 96–108)
CLARITY UR: CLEAR
CO2 SERPL-SCNC: 32 MMOL/L (ref 21–32)
COLOR UR: YELLOW
CREAT SERPL-MCNC: 1.25 MG/DL (ref 0.6–1.3)
EOSINOPHIL # BLD AUTO: 0.3 THOUSAND/ÂΜL (ref 0–0.61)
EOSINOPHIL NFR BLD AUTO: 6 % (ref 0–6)
ERYTHROCYTE [DISTWIDTH] IN BLOOD BY AUTOMATED COUNT: 15.8 % (ref 11.6–15.1)
GFR SERPL CREATININE-BSD FRML MDRD: 60 ML/MIN/1.73SQ M
GLUCOSE P FAST SERPL-MCNC: 115 MG/DL (ref 65–99)
GLUCOSE UR STRIP-MCNC: NEGATIVE MG/DL
HCT VFR BLD AUTO: 28.7 % (ref 36.5–49.3)
HGB BLD-MCNC: 9.5 G/DL (ref 12–17)
HGB UR QL STRIP.AUTO: NEGATIVE
IMM GRANULOCYTES # BLD AUTO: 0.04 THOUSAND/UL (ref 0–0.2)
IMM GRANULOCYTES NFR BLD AUTO: 1 % (ref 0–2)
KETONES UR STRIP-MCNC: NEGATIVE MG/DL
LEUKOCYTE ESTERASE UR QL STRIP: NEGATIVE
LYMPHOCYTES # BLD AUTO: 1.75 THOUSANDS/ÂΜL (ref 0.6–4.47)
LYMPHOCYTES NFR BLD AUTO: 37 % (ref 14–44)
MCH RBC QN AUTO: 32.5 PG (ref 26.8–34.3)
MCHC RBC AUTO-ENTMCNC: 33.1 G/DL (ref 31.4–37.4)
MCV RBC AUTO: 98 FL (ref 82–98)
MONOCYTES # BLD AUTO: 0.58 THOUSAND/ÂΜL (ref 0.17–1.22)
MONOCYTES NFR BLD AUTO: 12 % (ref 4–12)
NEUTROPHILS # BLD AUTO: 1.99 THOUSANDS/ÂΜL (ref 1.85–7.62)
NEUTS SEG NFR BLD AUTO: 43 % (ref 43–75)
NITRITE UR QL STRIP: NEGATIVE
NRBC BLD AUTO-RTO: 0 /100 WBCS
PH UR STRIP.AUTO: 5.5 [PH]
PLATELET # BLD AUTO: 266 THOUSANDS/UL (ref 149–390)
PMV BLD AUTO: 9.8 FL (ref 8.9–12.7)
POTASSIUM SERPL-SCNC: 3.7 MMOL/L (ref 3.5–5.3)
PROT SERPL-MCNC: 6.2 G/DL (ref 6.4–8.4)
PROT UR STRIP-MCNC: NEGATIVE MG/DL
RBC # BLD AUTO: 2.92 MILLION/UL (ref 3.88–5.62)
SODIUM SERPL-SCNC: 141 MMOL/L (ref 135–147)
SP GR UR STRIP.AUTO: 1.02 (ref 1–1.03)
UROBILINOGEN UR STRIP-ACNC: <2 MG/DL
WBC # BLD AUTO: 4.69 THOUSAND/UL (ref 4.31–10.16)

## 2025-07-10 PROCEDURE — 85025 COMPLETE CBC W/AUTO DIFF WBC: CPT

## 2025-07-10 PROCEDURE — 97129 THER IVNTJ 1ST 15 MIN: CPT

## 2025-07-10 PROCEDURE — 81003 URINALYSIS AUTO W/O SCOPE: CPT

## 2025-07-10 PROCEDURE — 36415 COLL VENOUS BLD VENIPUNCTURE: CPT

## 2025-07-10 PROCEDURE — 97110 THERAPEUTIC EXERCISES: CPT

## 2025-07-10 PROCEDURE — 88173 CYTOPATH EVAL FNA REPORT: CPT | Performed by: PATHOLOGY

## 2025-07-10 PROCEDURE — 88172 CYTP DX EVAL FNA 1ST EA SITE: CPT | Performed by: PATHOLOGY

## 2025-07-10 PROCEDURE — 97130 THER IVNTJ EA ADDL 15 MIN: CPT

## 2025-07-10 PROCEDURE — 80053 COMPREHEN METABOLIC PANEL: CPT

## 2025-07-10 PROCEDURE — 97112 NEUROMUSCULAR REEDUCATION: CPT

## 2025-07-10 NOTE — PROGRESS NOTES
Speech-Language Pathology Daily Progress Note    Today's date: 7/10/2025   Patient’s name: Alexis Dennison  : 1960  MRN: [id number]  Safety measures:   Referring provider: Evita Lee CRNP    Encounter Diagnosis     ICD-10-CM    1. CNS lesion  G96.9       2. Cognitive communication deficit  R41.841           Subjective: Flat affect, but cooperative    Short Term    Patient will be educated on word finding strategies (i.e., circumlocution) for improved generative naming and verbal expression skills.  7/10: Educated on word retrieval strategies. Provided handout. Practiced utilizing strategies. Utilized practicing description technique with list of words - minimal - moderate cues provided.     Patient will complete complex auditory attention processing tasks (e.g., sentence unscramble, ranking numbers/words, etc.) to improve working memory with 80% accuracy.    Patient will facilitate planning by completing thought organization tasks (e.g., sequencing, deduction puzzles, etc.) with 80% accuracy to facilitate increased executive functioning, working memory, problem solving, and processing skills.    Patient will complete auditory immediate and short term memory tasks to 80% accuracy to facilitate increased ability to retell narratives and recall information within functional living environment.    To target mental manipulation and working memory, patient will participate in word finding activity (i.e., anagrams) with 80% accuracy.    Patient will answer questions regarding story read aloud with 80% accuracy to facilitate improved auditory comprehension and recall.    Patient will complete reading comprehension tasks (e.g., answering questions, following complex written directions, etc.) to 80% accuracy to facilitate carryover of comprehension of functional reading materials.    Patient will demonstrate divided attention by responding to multiple tasks or details within tasks at the same time with min cues in  a distracting environment with 80% accuracy.    Patient will complete concrete and abstract categorization tasks to 80% accuracy to facilitate improved generative naming skills and working memory.    Patient will generate sentences and short paragraphs (e.g., sentence generation given words to incorporate, description of pictures, etc.) with fewer than 3 errors in grammar, organization, spelling and content with 80% accuracy to facilitate increased carryover of skills into functional living environment.    Patient will be educated on the use of internal and external memory aids and compensatory strategies with 80% accuracy to facilitate increased recall of routine, personal information, and recent events.  7/10: Completed education of memory strategies. Provided handout.          Long Term      Patient will complete cognitive-linguistic therapy that addresses patient's specific deficits in processing speed, short-term working memory, attention to detail, monitoring, sequencing, and organization skills, with instruction, to alleviate effects of executive functioning disorder deficits by discharge.      Patient will develop functional, cognitive-linguistic based skills and utilize compensatory strategies as needed to communicate effectively to different conversational partners, maintain safety, and participate socially by discharge.     Patient will improve ability to facilitate cognitive function and communication skills including use of compensatory strategies in a variety of functional living tasks to improve quality of like and to maximize level of independence.           Visit Tracking:  POC   Expires Auth Expiration Date ST Visit Limit   9/2/25  BOMN          Visit/Unit Tracking:  Auth Status Date 7/8 7/10   BOMN Used 1 2    Remaining         Intervention Comments:  Word retrieval and working memory and short term recall strategies. Provided adult book- word relationships for home practice.

## 2025-07-10 NOTE — PROGRESS NOTES
Daily Note     Today's date: 7/10/2025  Patient name: Alexis Dennison  : 1960  MRN: [id number]  Referring provider: Evita Lee CRNP  Dx:   Encounter Diagnosis     ICD-10-CM    1. 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 Ambulatory dysfunction  R26.2           Start Time: 0845  Stop Time: 0930  Total time in clinic (min): 45 minutes    Subjective: Alexis has chemo next week. Notes nothing new since last seen.      Objective: See treatment diary below      Assessment:  Appropriate HR response to aerobic trial. Good power with ball tossing. Shows good form with strengthening exercises and focus on power generating activities with fair overall pace.. No losses of balance during session, usually stops motor task to attend to conversation. Patient would benefit from continued PT      Plan: Continue per plan of care. Pt at chemo next week, will follow up following week.     Daily Treatment Diary     Precautions: undergone chemo,   CO-MORBIDITIES: CNS lymphoma, T2DM, anemia  HEP ACCESS CODE:     POC Expires Reeval for Medicare to be completed  Unit Limit Auth Expiration Date PT/OT/STVisit Limit    By visit na bomn  30    Completed on visit                    Auth Status DATE 7/8 7/10       approved Visit # 1 2        Remaining 29 28       Testing         6MWT 1280ft        30s chair stand/5xSTS 14.71 / 10        TUG/TUGC 11.10        FGA/MiniBest 27        10mWT 1.23                 THERAPEUTIC EXERCISE HEP         Aerobic   TM 6% 2.1mph max hR 125bpm       STS Squats, lat step up, step up, daily walking  Tidal tank 3x10       SLRx4          HR/TR          Leg Press          Ball toss   8lb med ball chest pass/OH pass x12 total       NEUROMUSCULAR REEDUCATION           Dynamic balance   Mini tidal tank hold x30 step up 6'    Lat step BTB at forefoot 20ft x4 ea direction    8lb med ball OH carry 20ft x6       Static balance          Obstacle course          Resisted stepping   44lb  fwd/bwd x20 ea       Perturbation training                                                                                                    THERAPEUTIC ACTIVITY                                                  GAIT TRAINING          Stairs          No AD/SPC                              Manuals

## 2025-07-11 ENCOUNTER — TELEPHONE (OUTPATIENT)
Dept: HEMATOLOGY ONCOLOGY | Facility: CLINIC | Age: 65
End: 2025-07-11

## 2025-07-11 RX ORDER — SODIUM CHLORIDE 9 MG/ML
20 INJECTION, SOLUTION INTRAVENOUS ONCE AS NEEDED
Status: CANCELLED | OUTPATIENT
Start: 2025-07-14

## 2025-07-11 RX ORDER — LEUCOVORIN CALCIUM 25 MG/1
25 TABLET ORAL EVERY 6 HOURS
Status: CANCELLED | OUTPATIENT
Start: 2025-07-16

## 2025-07-11 NOTE — TELEPHONE ENCOUNTER
I phoned the patient's daughter, Elizabeth, re-introduced myself, and indicated that I was calling from [facility] to provide a reminder for her Dad's upcoming admission to [facility] on Monday 7/14 for his chemotherapy. Elizabeth indicated that she and her Dad are aware of the admission and how to reach P9. We reviewed that they can arrive as close to 0800 as they are able. Elizabeth expressed understanding and was appreciative of the call.

## 2025-07-12 NOTE — QUICK NOTE
Planned Direct Admission for Chemoimmunotherapy:  Inpatient Medical Oncology Fellow  Alexis Dennison 64 y.o. male MRN: [id number]    Please select all of the following that have been completed in anticipation of this planned direct-admission (check each applicable box):    [x]  Ensure that a chemoimmunotherapy consent form is on-file in the 'Epic Media Tab,' and corresponds with the planned treatment. If an up-to-date consent is not uploaded, please notify the Inpatient Cancer Coordinator.    [x]  Review pre-treatment laboratory-studies, and ancillary-studies (e.g. Echocardiogram for cardiotoxic chemotherapy - Please refer to the Prescriptions tab, followed by Supportive Care in the treatment plan for required ancillary-studies) for completion. If appropriate testing has not been completed 24-hours prior to the planned direct-admission date, please contact the primary Medical Oncology provider's nurse and/or the Inpatient Cancer Coordinator. Note: Please remove ordered labs in the [facility] Treatment Plan to prevent unnecessary repeat labs from being obtained on admission.    [x]  If pre-treatment laboratory-studies, and ancillary-testing meet parameters for treatment, please enter a Nursing Communication order into the [facility] Treatment plan, stating as such. If testing is not appropriate for treatment, please contact the primary Medical Oncology provider's nurse, and the Inpatient Cancer Coordinator for further recommendations (e.g. Potential need for delayed or cancelled admission).    [x]  Verify that all chemoimmunotherapy orders, and supportive-cares (e.g. Premedications) have been placed into the [facility] Treatment Plan. Orders should correspond with the most recent Medical Oncology Progress Note. Note: If the upcoming treatment cycle is dose-attenuated for any reason, please make sure that the change in dosing, and reason for change is appropriately documented in the most recent outpatient progress note. If it is not  documented, please contact the primary Medical Oncology provider in advance of the direct-admission date, to clarify dosing.    [x]  After all orders have been verified, please ensure that the upcoming cycle is dated correctly for the planned direct-admission start date.    [x]  Convert the upcoming cycle to inpatient status (e.g. A 'hospital bed' should appear next to the upcoming cycle day(s) planned to be be completed during the planned direct-admission). Note: Orders that are planned to be administered in the outpatient setting (e.g. Growth-factor, such as Neulasta), should not be converted to inpatient status, and will have a 'clinic building' next to the corresponding planned cycle day(s).     [x]  Ensure that all orders for the upcoming cycle have been signed by the primary Medical Oncology provider (e.g. A 'green-checkmark' should appear on the left-side of all orders planned to be administered during the upcoming cycle). If the orders are not signed, please notify the Inpatient Cancer Coordinator.    []  If patient requires central-venous access, and does not have a Port-A-Cath in place, ensure that a PICC-line consent form is on-file in the 'Epic Media Tab.' Ask the Inpatient Cancer Coordinator to contact the Vascular Access team to expedite PICC-placement on admission. Note: If a PICC-line consent is not obtained and signed prior to admission, please obtain a consent on patient arrival (Please check Line No. 4 for current and future hospitalizations, if a PICC-line will be required for future direct-admissions).    Please specify any barriers to completing this pre-admission checklist (Examples: [place] Treatment Plan orders were not signed by primary provider, chemoimmunotherapy dosing is not reflected in most recent progress note, etc): Will need PICC-line consent on admission.

## 2025-07-14 ENCOUNTER — HOSPITAL ENCOUNTER (INPATIENT)
Facility: HOSPITAL | Age: 65
LOS: 5 days | Discharge: HOME | DRG: 847 | End: 2025-07-19
Attending: STUDENT IN AN ORGANIZED HEALTH CARE EDUCATION/TRAINING PROGRAM | Admitting: INTERNAL MEDICINE
Payer: COMMERCIAL

## 2025-07-14 DIAGNOSIS — C83.390 PRIMARY CNS LYMPHOMA: Primary | ICD-10-CM

## 2025-07-14 LAB
ALBUMIN SERPL BCG-MCNC: 4.2 G/DL (ref 3.5–5)
ALP SERPL-CCNC: 60 U/L (ref 34–104)
ALT SERPL W P-5'-P-CCNC: 15 U/L (ref 7–52)
ANION GAP SERPL CALCULATED.3IONS-SCNC: 5 MMOL/L (ref 4–13)
AST SERPL W P-5'-P-CCNC: 11 U/L (ref 13–39)
BACTERIA UR QL AUTO: ABNORMAL /HPF
BASOPHILS # BLD AUTO: 0.03 THOUSANDS/ÂΜL (ref 0–0.1)
BASOPHILS NFR BLD AUTO: 1 % (ref 0–1)
BILIRUB SERPL-MCNC: 0.5 MG/DL (ref 0.2–1)
BILIRUB UR QL STRIP: NEGATIVE
BUN SERPL-MCNC: 15 MG/DL (ref 5–25)
CALCIUM SERPL-MCNC: 9.2 MG/DL (ref 8.4–10.2)
CHLORIDE SERPL-SCNC: 103 MMOL/L (ref 96–108)
CLARITY UR: CLEAR
CO2 SERPL-SCNC: 31 MMOL/L (ref 21–32)
COLOR UR: ABNORMAL
COLOR UR: COLORLESS
COLOR UR: YELLOW
CREAT SERPL-MCNC: 0.95 MG/DL (ref 0.6–1.3)
EOSINOPHIL # BLD AUTO: 0.25 THOUSAND/ÂΜL (ref 0–0.61)
EOSINOPHIL NFR BLD AUTO: 4 % (ref 0–6)
ERYTHROCYTE [DISTWIDTH] IN BLOOD BY AUTOMATED COUNT: 15.3 % (ref 11.6–15.1)
GFR SERPL CREATININE-BSD FRML MDRD: 83 ML/MIN/1.73SQ M
GLUCOSE SERPL-MCNC: 107 MG/DL (ref 65–140)
GLUCOSE SERPL-MCNC: 159 MG/DL (ref 65–140)
GLUCOSE SERPL-MCNC: 355 MG/DL (ref 65–140)
GLUCOSE SERPL-MCNC: 87 MG/DL (ref 65–140)
GLUCOSE SERPL-MCNC: 91 MG/DL (ref 65–140)
GLUCOSE UR STRIP-MCNC: ABNORMAL MG/DL
GLUCOSE UR STRIP-MCNC: ABNORMAL MG/DL
GLUCOSE UR STRIP-MCNC: NEGATIVE MG/DL
HCT VFR BLD AUTO: 28.6 % (ref 36.5–49.3)
HGB BLD-MCNC: 9.4 G/DL (ref 12–17)
HGB UR QL STRIP.AUTO: ABNORMAL
HGB UR QL STRIP.AUTO: NEGATIVE
HGB UR QL STRIP.AUTO: NEGATIVE
IMM GRANULOCYTES # BLD AUTO: 0.04 THOUSAND/UL (ref 0–0.2)
IMM GRANULOCYTES NFR BLD AUTO: 1 % (ref 0–2)
INR PPP: 1.01 (ref 0.85–1.19)
KETONES UR STRIP-MCNC: NEGATIVE MG/DL
LEUKOCYTE ESTERASE UR QL STRIP: ABNORMAL
LEUKOCYTE ESTERASE UR QL STRIP: NEGATIVE
LEUKOCYTE ESTERASE UR QL STRIP: NEGATIVE
LYMPHOCYTES # BLD AUTO: 1.43 THOUSANDS/ÂΜL (ref 0.6–4.47)
LYMPHOCYTES NFR BLD AUTO: 23 % (ref 14–44)
MAGNESIUM SERPL-MCNC: 1.8 MG/DL (ref 1.9–2.7)
MCH RBC QN AUTO: 31.8 PG (ref 26.8–34.3)
MCHC RBC AUTO-ENTMCNC: 32.9 G/DL (ref 31.4–37.4)
MCV RBC AUTO: 97 FL (ref 82–98)
MONOCYTES # BLD AUTO: 0.6 THOUSAND/ÂΜL (ref 0.17–1.22)
MONOCYTES NFR BLD AUTO: 10 % (ref 4–12)
NEUTROPHILS # BLD AUTO: 3.96 THOUSANDS/ÂΜL (ref 1.85–7.62)
NEUTS SEG NFR BLD AUTO: 61 % (ref 43–75)
NITRITE UR QL STRIP: NEGATIVE
NON-SQ EPI CELLS URNS QL MICRO: ABNORMAL /HPF
NRBC BLD AUTO-RTO: 0 /100 WBCS
PH UR STRIP.AUTO: 7 [PH]
PH UR STRIP.AUTO: 7.5 [PH]
PH UR STRIP.AUTO: 8 [PH]
PLATELET # BLD AUTO: 343 THOUSANDS/UL (ref 149–390)
PMV BLD AUTO: 9 FL (ref 8.9–12.7)
POTASSIUM SERPL-SCNC: 3.9 MMOL/L (ref 3.5–5.3)
PROT SERPL-MCNC: 6.1 G/DL (ref 6.4–8.4)
PROT UR STRIP-MCNC: ABNORMAL MG/DL
PROT UR STRIP-MCNC: NEGATIVE MG/DL
PROT UR STRIP-MCNC: NEGATIVE MG/DL
PROTHROMBIN TIME: 13.6 SECONDS (ref 12.3–15)
RBC # BLD AUTO: 2.96 MILLION/UL (ref 3.88–5.62)
RBC #/AREA URNS AUTO: ABNORMAL /HPF
SODIUM SERPL-SCNC: 139 MMOL/L (ref 135–147)
SP GR UR STRIP.AUTO: 1 (ref 1–1.03)
SP GR UR STRIP.AUTO: 1.01 (ref 1–1.03)
SP GR UR STRIP.AUTO: 1.02 (ref 1–1.03)
UROBILINOGEN UR STRIP-ACNC: <2 MG/DL
WBC # BLD AUTO: 6.31 THOUSAND/UL (ref 4.31–10.16)
WBC #/AREA URNS AUTO: ABNORMAL /HPF

## 2025-07-14 PROCEDURE — 99254 IP/OBS CNSLTJ NEW/EST MOD 60: CPT | Performed by: INTERNAL MEDICINE

## 2025-07-14 PROCEDURE — 85610 PROTHROMBIN TIME: CPT | Performed by: INTERNAL MEDICINE

## 2025-07-14 PROCEDURE — C1751 CATH, INF, PER/CENT/MIDLINE: HCPCS

## 2025-07-14 PROCEDURE — 83735 ASSAY OF MAGNESIUM: CPT | Performed by: INTERNAL MEDICINE

## 2025-07-14 PROCEDURE — 85025 COMPLETE CBC W/AUTO DIFF WBC: CPT | Performed by: INTERNAL MEDICINE

## 2025-07-14 PROCEDURE — 81001 URINALYSIS AUTO W/SCOPE: CPT | Performed by: INTERNAL MEDICINE

## 2025-07-14 PROCEDURE — 36569 INSJ PICC 5 YR+ W/O IMAGING: CPT

## 2025-07-14 PROCEDURE — 82948 REAGENT STRIP/BLOOD GLUCOSE: CPT

## 2025-07-14 PROCEDURE — 99222 1ST HOSP IP/OBS MODERATE 55: CPT | Performed by: INTERNAL MEDICINE

## 2025-07-14 PROCEDURE — 80053 COMPREHEN METABOLIC PANEL: CPT | Performed by: INTERNAL MEDICINE

## 2025-07-14 PROCEDURE — 81003 URINALYSIS AUTO W/O SCOPE: CPT | Performed by: INTERNAL MEDICINE

## 2025-07-14 RX ORDER — INSULIN LISPRO 100 [IU]/ML
1-5 INJECTION, SOLUTION INTRAVENOUS; SUBCUTANEOUS
Status: DISCONTINUED | OUTPATIENT
Start: 2025-07-14 | End: 2025-07-19 | Stop reason: HOSPADM

## 2025-07-14 RX ORDER — SODIUM CHLORIDE 9 MG/ML
20 INJECTION, SOLUTION INTRAVENOUS ONCE AS NEEDED
Status: CANCELLED | OUTPATIENT
Start: 2025-07-14

## 2025-07-14 RX ORDER — PANTOPRAZOLE SODIUM 40 MG/1
40 TABLET, DELAYED RELEASE ORAL
Status: DISCONTINUED | OUTPATIENT
Start: 2025-07-14 | End: 2025-07-19 | Stop reason: HOSPADM

## 2025-07-14 RX ORDER — DOCUSATE SODIUM 100 MG/1
100 CAPSULE, LIQUID FILLED ORAL 2 TIMES DAILY
Status: DISCONTINUED | OUTPATIENT
Start: 2025-07-14 | End: 2025-07-19 | Stop reason: HOSPADM

## 2025-07-14 RX ORDER — SENNOSIDES 8.6 MG
17.2 TABLET ORAL
Status: DISCONTINUED | OUTPATIENT
Start: 2025-07-14 | End: 2025-07-19 | Stop reason: HOSPADM

## 2025-07-14 RX ORDER — SODIUM CHLORIDE 9 MG/ML
20 INJECTION, SOLUTION INTRAVENOUS ONCE AS NEEDED
Status: DISCONTINUED | OUTPATIENT
Start: 2025-07-14 | End: 2025-07-19 | Stop reason: HOSPADM

## 2025-07-14 RX ORDER — MAGNESIUM SULFATE HEPTAHYDRATE 40 MG/ML
2 INJECTION, SOLUTION INTRAVENOUS ONCE
Status: COMPLETED | OUTPATIENT
Start: 2025-07-14 | End: 2025-07-14

## 2025-07-14 RX ORDER — SODIUM BICARBONATE 650 MG/1
650 TABLET ORAL 4 TIMES DAILY
Status: COMPLETED | OUTPATIENT
Start: 2025-07-14 | End: 2025-07-14

## 2025-07-14 RX ORDER — ENOXAPARIN SODIUM 100 MG/ML
40 INJECTION SUBCUTANEOUS DAILY
Status: DISCONTINUED | OUTPATIENT
Start: 2025-07-14 | End: 2025-07-19 | Stop reason: HOSPADM

## 2025-07-14 RX ORDER — SODIUM CHLORIDE 9 MG/ML
20 INJECTION, SOLUTION INTRAVENOUS ONCE AS NEEDED
Status: CANCELLED | OUTPATIENT
Start: 2025-07-29

## 2025-07-14 RX ORDER — ACETAMINOPHEN 325 MG/1
650 TABLET ORAL EVERY 6 HOURS PRN
Status: DISCONTINUED | OUTPATIENT
Start: 2025-07-14 | End: 2025-07-19 | Stop reason: HOSPADM

## 2025-07-14 RX ORDER — QUETIAPINE FUMARATE 25 MG/1
12.5 TABLET, FILM COATED ORAL 2 TIMES DAILY
Status: DISCONTINUED | OUTPATIENT
Start: 2025-07-14 | End: 2025-07-19 | Stop reason: HOSPADM

## 2025-07-14 RX ORDER — ATORVASTATIN CALCIUM 20 MG/1
20 TABLET, FILM COATED ORAL DAILY
Status: DISCONTINUED | OUTPATIENT
Start: 2025-07-14 | End: 2025-07-19 | Stop reason: HOSPADM

## 2025-07-14 RX ORDER — LEUCOVORIN CALCIUM 25 MG/1
25 TABLET ORAL EVERY 6 HOURS
Status: CANCELLED | OUTPATIENT
Start: 2025-07-16

## 2025-07-14 RX ORDER — ALLOPURINOL 300 MG/1
300 TABLET ORAL DAILY
Status: DISCONTINUED | OUTPATIENT
Start: 2025-07-14 | End: 2025-07-19 | Stop reason: HOSPADM

## 2025-07-14 RX ADMIN — METHOTREXATE 6000 MG: 25 INJECTION, SOLUTION INTRA-ARTERIAL; INTRAMUSCULAR; INTRATHECAL; INTRAVENOUS at 15:31

## 2025-07-14 RX ADMIN — QUETIAPINE 12.5 MG: 25 TABLET ORAL at 21:53

## 2025-07-14 RX ADMIN — SODIUM BICARBONATE 650 MG TABLET 650 MG: at 17:04

## 2025-07-14 RX ADMIN — SODIUM BICARBONATE 650 MG TABLET 650 MG: at 21:53

## 2025-07-14 RX ADMIN — DOCUSATE SODIUM 100 MG: 100 CAPSULE, LIQUID FILLED ORAL at 21:53

## 2025-07-14 RX ADMIN — SODIUM BICARBONATE 150 ML/HR: 84 INJECTION, SOLUTION INTRAVENOUS at 10:54

## 2025-07-14 RX ADMIN — DOCUSATE SODIUM 100 MG: 100 CAPSULE, LIQUID FILLED ORAL at 09:44

## 2025-07-14 RX ADMIN — PANTOPRAZOLE SODIUM 40 MG: 40 TABLET, DELAYED RELEASE ORAL at 09:44

## 2025-07-14 RX ADMIN — SODIUM BICARBONATE 650 MG TABLET 650 MG: at 13:22

## 2025-07-14 RX ADMIN — MAGNESIUM SULFATE HEPTAHYDRATE 2 G: 40 INJECTION, SOLUTION INTRAVENOUS at 10:47

## 2025-07-14 RX ADMIN — DEXAMETHASONE SODIUM PHOSPHATE: 10 INJECTION, SOLUTION INTRAMUSCULAR; INTRAVENOUS at 14:49

## 2025-07-14 RX ADMIN — SENNOSIDES 17.2 MG: 8.6 TABLET, FILM COATED ORAL at 13:22

## 2025-07-14 RX ADMIN — INSULIN LISPRO 1 UNITS: 100 INJECTION, SOLUTION INTRAVENOUS; SUBCUTANEOUS at 16:54

## 2025-07-14 RX ADMIN — ATORVASTATIN CALCIUM 20 MG: 20 TABLET, FILM COATED ORAL at 09:44

## 2025-07-14 RX ADMIN — QUETIAPINE 12.5 MG: 25 TABLET ORAL at 09:44

## 2025-07-14 RX ADMIN — INSULIN LISPRO 3 UNITS: 100 INJECTION, SOLUTION INTRAVENOUS; SUBCUTANEOUS at 21:56

## 2025-07-14 RX ADMIN — ENOXAPARIN SODIUM 40 MG: 40 INJECTION SUBCUTANEOUS at 09:44

## 2025-07-14 RX ADMIN — ALLOPURINOL 300 MG: 300 TABLET ORAL at 09:44

## 2025-07-14 RX ADMIN — SODIUM CHLORIDE 20 ML/HR: 0.9 INJECTION, SOLUTION INTRAVENOUS at 14:49

## 2025-07-14 RX ADMIN — SODIUM BICARBONATE 650 MG TABLET 650 MG: at 09:44

## 2025-07-14 NOTE — H&P
H&P - Hospitalist   Name: Alexis Dennison 64 y.o. male I MRN: [id number]  [facility] Date of Admission: 7/14/2025   Date of Service: 7/14/2025 I Hospital Day: 0     Assessment & Plan  Primary CNS lymphoma  Diagnosed on 4/14/2025  Has been receiving inpatient high-dose methotrexate and rituximab and outpatient temozolomide 90 mg a day on day 7-11  Directly admitted for initiation of cycle 6  Medical oncology consulted  Monitor electrolytes, replace magnesium  Monitor urine pH  Type 2 diabetes mellitus with hyperglycemia, without long-term current use of insulin ([facility])  Lab Results   Component Value Date    HGBA1C 5.5 06/16/2025       Recent Labs     07/14/25  0938   POCGLU 91       Hold oral meds  Start insulin sliding scale    HTN (hypertension)  Not on hypertensive meds  Monitor  Anemia  Recent iron studies reviewed  Check folic acid and B12 level  Monitor      VTE Pharmacologic Prophylaxis: VTE Score: 7 High Risk (Score >/= 5) - Pharmacological DVT Prophylaxis Ordered: enoxaparin (Lovenox). Sequential Compression Devices Ordered.  Code Status: Level 1 - Full Code   Discussion with family: Updated  (sister) at bedside.    Anticipated Length of Stay: Patient will be admitted on an inpatient basis with an anticipated length of stay of greater than 2 midnights secondary to inpatient chemotherapy.    History of Present Illness   Chief Complaint:   Patient was directly admitted to the hospital for inpatient chemotherapy    Alexis Dennison is a 64 y.o. male with a PMH of primary CNS lymphoma, type 2 diabetes mellitus, hyperlipidemia and hypertension who presents for inpatient chemotherapy  Patient was recently diagnosed in April 2025 with a primary CNS lymphoma and was started on chemotherapy on April 18  Currently he was admitted for initiation of cycle 6.  Patient denied recent illness    Sister at bedside    Review of Systems   Constitutional:  Negative for chills and fever.   HENT:  Negative for  sore throat.    Respiratory:  Negative for chest tightness and shortness of breath.    Cardiovascular:  Negative for chest pain, palpitations and leg swelling.   Gastrointestinal:  Negative for abdominal pain, blood in stool, diarrhea, nausea and vomiting.   Endocrine: Negative for polyuria.   Genitourinary:  Negative for difficulty urinating and dysuria.   Neurological:  Negative for dizziness, speech difficulty and headaches.   All other systems reviewed and are negative.      Historical Information   Past Medical History[1]  Past Surgical History[2]  Social History[3]  E-Cigarette/Vaping    E-Cigarette Use Never User      E-Cigarette/Vaping Substances    Nicotine No     THC No     CBD No     Flavoring No     Other No     Unknown No      Family history non-contributory  Social History:  Marital Status: /Civil Union       Meds/Allergies   I have reviewed home medications with patient personally.  Prior to Admission medications    Medication Sig Start Date End Date Taking? Authorizing Provider   acetaminophen (TYLENOL) 325 mg tablet Take 2 tablets (650 mg total) by mouth every 6 (six) hours as needed for mild pain, moderate pain, severe pain, headaches or fever 5/27/25   Joshua Kaminski,    allopurinol (ZYLOPRIM) 300 mg tablet Take 1 tablet (300 mg total) by mouth daily 5/28/25   PATI Porras   atorvastatin (LIPITOR) 20 mg tablet Take 20 mg by mouth in the morning.    Historical Provider, MD   docusate sodium (COLACE) 100 mg capsule Take 1 capsule (100 mg total) by mouth 2 (two) times a day 5/20/25   Jessica Arreola,    omeprazole (PriLOSEC) 20 mg delayed release capsule Take 1 capsule (20 mg total) by mouth daily 5/28/25   PATI Porras   ondansetron (ZOFRAN) 8 mg tablet Take 1 tablet (8 mg total) by mouth every 8 (eight) hours as needed for nausea or vomiting 6/24/25   Leta Phan MD   QUEtiapine (SEROquel) 25 mg tablet Take 0.5 tablets (12.5 mg total) by mouth at noon  and 0.5 tablets (12.5 mg total) before bedtime. 5/27/25   Joshua KaminskiDO   senna (SENOKOT) 8.6 mg Take 2 tablets (17.2 mg total) by mouth daily with lunch 5/21/25   Jessica ArreolaDO   SITaglip Phos-metFORMIN HCl ER (Janumet XR)  MG TB24 Take 1 tablet by mouth in the morning 5/29/25   Edvin Dior PA-C   sodium bicarbonate 650 mg tablet Take 1 tablet (650 mg total) by mouth 4 (four) times a day for 2 days Do not start before July 12, 2025. 7/12/25 7/14/25  Leta Phan MD   temozolomide (TEMODAR) 20 mg capsule Take 2 capsules (40 mg total) by mouth daily On days 7 through 11 of each cycle 6/5/25   Leta Phan MD   temozolomide (TEMODAR) 250 MG capsule Take 1 capsule (250 mg total) by mouth daily On days 7 through 11 of each cycle 6/5/25   Leta Phan MD     No Known Allergies    Objective :       Physical Exam  Vitals reviewed.   Constitutional:       Appearance: Normal appearance. He is not ill-appearing.   HENT:      Head: Normocephalic and atraumatic.      Mouth/Throat:      Mouth: Mucous membranes are moist.      Pharynx: No oropharyngeal exudate.     Eyes:      General: No scleral icterus.     Extraocular Movements: Extraocular movements intact.       Cardiovascular:      Rate and Rhythm: Normal rate and regular rhythm.      Pulses: Normal pulses.      Heart sounds: Normal heart sounds. No murmur heard.  Pulmonary:      Effort: Pulmonary effort is normal. No respiratory distress.      Breath sounds: Normal breath sounds. No wheezing.   Abdominal:      General: Bowel sounds are normal. There is no distension.      Palpations: Abdomen is soft.      Tenderness: There is no abdominal tenderness.     Musculoskeletal:         General: Normal range of motion.      Cervical back: Normal range of motion and neck supple.      Right lower leg: No edema.      Left lower leg: No edema.     Skin:     General: Skin is warm and dry.     Neurological:      General: No focal  deficit present.      Mental Status: He is alert and oriented to person, place, and time.      Cranial Nerves: No cranial nerve deficit.     Psychiatric:         Mood and Affect: Mood normal.            Lines/Drains:  Lines/Drains/Airways       Active Status       Name Placement date Placement time Site Days    PICC Line 07/14/25 Left Basilic 07/14/25  0923  Basilic  less than 1                    Central Line:  Goal for removal: N/A - Chronic PICC             Lab Results: I have reviewed the following results:  Results from last 7 days   Lab Units 07/14/25  0932   WBC Thousand/uL 6.31   HEMOGLOBIN g/dL 9.4*   HEMATOCRIT % 28.6*   PLATELETS Thousands/uL 343   SEGS PCT % 61   LYMPHO PCT % 23   MONO PCT % 10   EOS PCT % 4     Results from last 7 days   Lab Units 07/14/25  0932   SODIUM mmol/L 139   POTASSIUM mmol/L 3.9   CHLORIDE mmol/L 103   CO2 mmol/L 31   BUN mg/dL 15   CREATININE mg/dL 0.95   ANION GAP mmol/L 5   CALCIUM mg/dL 9.2   ALBUMIN g/dL 4.2   TOTAL BILIRUBIN mg/dL 0.50   ALK PHOS U/L 60   ALT U/L 15   AST U/L 11*   GLUCOSE RANDOM mg/dL 87     Results from last 7 days   Lab Units 07/14/25  0932   INR  1.01     Results from last 7 days   Lab Units 07/14/25  0938   POC GLUCOSE mg/dl 91     Lab Results   Component Value Date    HGBA1C 5.5 06/16/2025    HGBA1C 6.6 (H) 02/22/2025    HGBA1C 6.3 (H) 11/09/2024           Imaging Results Review: No pertinent imaging studies reviewed.  Other Study Results Review: No additional pertinent studies reviewed.    Administrative Statements   I have spent a total time of 55 minutes in caring for this patient on the day of the visit/encounter including Counseling / Coordination of care, Documenting in the medical record, Reviewing/placing orders in the medical record (including tests, medications, and/or procedures), Obtaining or reviewing history  , and Communicating with other healthcare professionals .    ** Please Note: This note has been constructed using a voice  recognition system. **         [1]   Past Medical History:  Diagnosis Date    Colon polyp     Diabetes mellitus (HCC)     GERD (gastroesophageal reflux disease)     Hyperlipidemia     Hypertension     Liver disease     Sleep apnea    [2]   Past Surgical History:  Procedure Laterality Date    COLONOSCOPY      CYST REMOVAL      back    FL LUMBAR PUNCTURE DIAGNOSTIC  3/31/2025    FL LUMBAR PUNCTURE DIAGNOSTIC  4/7/2025    MO EXC B9 LESION MRGN XCP SK TG T/A/L 0.6-1.0 CM N/A 6/7/2023    Procedure: EXCISION  BIOPSY LESION/MASS ABDOMINAL/CHEST WALL;  Surgeon: Trevor Villavicencio MD;  Location: [facility];  Service: General    THIRD VENTRICULOSTOMY Left 4/14/2025    Procedure: Left frontal endoscopic biopsy of ventricular mass and placement of EVD;  Surgeon: Paul Causey MD;  Location: [facility];  Service: Neurosurgery    US GUIDED THYROID BIOPSY  7/8/2025   [3]   Social History  Tobacco Use    Smoking status: Never     Passive exposure: Never    Smokeless tobacco: Never    Tobacco comments:     N/a   Vaping Use    Vaping status: Never Used   Substance and Sexual Activity    Alcohol use: Yes     Alcohol/week: 1.0 standard drink of alcohol     Types: 1 Cans of beer per week     Comment: socially    Drug use: Never    Sexual activity: Not Currently     Partners: Female

## 2025-07-14 NOTE — ASSESSMENT & PLAN NOTE
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 Phan      On Q-14 days High-dose IV Methotrexate   C1 (4/18) 8 g/m²  C2 (5/2) 3 g/m², dose-reduced due to LETY, delayed x1 day due to E. coli bacteremia   C3 (5/21) 3 g/m²   C4 (6/4) 3 g/m², deferred due to neutropenia  C4 (6/17) 3 g/m²  C5 (6/30) 3 g/m²  Planned for C6 Day 1 7/14, 3g/m²      S/p Rituximab x 6 doses 4/17/25 - 6/19/2025     On Temozolamide 90 mg a day , on days 7 - 11 (received last on 6/23 - 6/27)

## 2025-07-14 NOTE — CONSULTS
Consultation - Oncology-Medical   Name: Alexis Dennison 64 y.o. 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 Phan      On Q-14 days High-dose IV Methotrexate   C1 (4/18) 8 g/m²  C2 (5/2) 3 g/m², dose-reduced due to LETY, delayed x1 day due to E. coli bacteremia   C3 (5/21) 3 g/m²   C4 (6/4) 3 g/m², deferred due to neutropenia  C4 (6/17) 3 g/m²  C5 (6/30) 3 g/m²  Planned for C6 Day 1 7/14, 3g/m²      S/p Rituximab x 6 doses 4/17/25 - 6/19/2025     On Temozolamide 90 mg a day , on days 7 - 11 (received last on 6/23 - 6/27)     PLAN   - here for high dose methotrexate cycle 6 day 1, vitals, labs, exam and ROS reviewed, okay to proceed with treatment as planned pending UA  - daily CBC and CMP  - IV Sodium Bicarb + Q6H UA as per protocol, UA PH goal > 7.0   - f/u MTX levels 24H 48H 72H from MTX start time, goal before discharge < 0.05  - Q6H Leucovorin 24 hours post MTX   - on ppx allopurinol    History of Present Illness   Alexis Dennison is a 64 y.o. male who presents with primary CNS (Dx 4/14/2025, double expressor (BCL2+/MYC+), DLBCL NOS, non-germinal center, Ki-67 60-70, BCL6 rearrangement), DM2 with baseline b/l LE neuropathy, NAFLD, and HTN with his treatment course of high-dose MTX + rituximab + temozolomide patient is presenting today for inpatient planned chemotherapy as outlined above. .    Review of Systems   Constitutional:  Negative for chills, fatigue and fever.   HENT: Negative.     Eyes: Negative.    Respiratory:  Negative for chest  tightness and shortness of breath.    Cardiovascular:  Negative for chest pain, palpitations and leg swelling.   Gastrointestinal:  Negative for abdominal distention and abdominal pain.   Neurological:  Negative for seizures, light-headedness, numbness and headaches.   All other systems reviewed and are negative.    Historical Information   Medical History Review: I have reviewed the patient's PMH, PSH, Social History, Family History, Meds, and Allergies     Oncology History:   Cancer Staging   No matching staging information was found for the patient.    Oncology History   Primary CNS lymphoma   4/16/2025 Initial Diagnosis    Primary CNS lymphoma     4/17/2025 -  Chemotherapy    leucovorin (WELLCOVORIN), 25 mg, 5 of 8 cycles  Administration: 25 mg (4/19/2025), 25 mg (4/19/2025), 25 mg (4/20/2025), 25 mg (4/20/2025), 25 mg (4/20/2025), 25 mg (4/21/2025), 25 mg (5/23/2025), 25 mg (5/24/2025), 25 mg (5/24/2025), 25 mg (5/24/2025), 25 mg (5/24/2025), 25 mg (5/25/2025), 25 mg (5/25/2025), 25 mg (6/20/2025), 25 mg (6/20/2025), 25 mg (6/20/2025), 25 mg (6/20/2025), 25 mg (6/21/2025), 25 mg (6/21/2025), 25 mg (6/21/2025), 25 mg (6/21/2025), 25 mg (6/22/2025), 25 mg (6/22/2025), 25 mg (6/22/2025), 25 mg (7/3/2025), 25 mg (7/3/2025), 25 mg (7/3/2025), 25 mg (7/3/2025), 25 mg (7/4/2025), 25 mg (7/4/2025)  riTUXimab (RITUXAN) subsequent titrated chemo infusion, 765 mg (100 % of original dose 375 mg/m2), 1 of 1 cycle  Dose modification: 375 mg/m2 (original dose 375 mg/m2, Cycle 4)  Administration: 800 mg (6/19/2025)  leucovorin calcium IVPB, 25 mg, 5 of 8 cycles  Administration: 25 mg (5/3/2025), 25 mg (5/3/2025), 25 mg (5/4/2025), 25 mg (5/4/2025), 25 mg (5/4/2025), 25 mg (5/4/2025), 25 mg (7/1/2025), 25 mg (7/1/2025), 25 mg (7/2/2025), 25 mg (7/2/2025), 25 mg (7/2/2025), 25 mg (7/2/2025), 25 mg (6/18/2025), 25 mg (6/18/2025), 25 mg (6/19/2025), 25 mg (6/19/2025), 25 mg (6/19/2025), 25 mg (6/19/2025), 25 mg (5/22/2025), 25 mg  (5/23/2025), 25 mg (5/23/2025), 25 mg (5/23/2025), 25 mg (5/24/2025)  methotrexate (PF) IVPB, 16,320 mg, 5 of 8 cycles  Dose modification: 8,000 mg/m2 (original dose 3,500 mg/m2, Cycle 2, Reason: Other (Must fill in a comment), Comment: Patient with aggressive PCNSL), 3,000 mg/m2 (original dose 3,500 mg/m2, Cycle 2, Reason: Other (Must fill in a comment), Comment: Elevated SCr)  Administration: 6,000 mg (5/2/2025), 6,000 mg (5/21/2025), 16,000 mg (4/18/2025), 6,000 mg (6/30/2025), 6,000 mg (6/17/2025)  riTUXimab (RITUXAN) first titrated chemo infusion, 765 mg (100 % of original dose 375 mg/m2), 3 of 3 cycles  Dose modification: 375 mg/m2 (original dose 375 mg/m2, Cycle 1)  Administration: 800 mg (4/17/2025), 700 mg (4/24/2025), 800 mg (5/3/2025), 800 mg (5/10/2025), 800 mg (5/23/2025)  riTUXimab-ABBS (TRUXIMA) first titrated infusion, 375 mg/m2 = 765 mg, 1 of 1 cycle       Current Medications[1]    Objective :  BP: ()/()     Physical Exam  Vitals reviewed.   Constitutional:       Appearance: Normal appearance.     Eyes:      Pupils: Pupils are equal, round, and reactive to light.       Cardiovascular:      Rate and Rhythm: Normal rate and regular rhythm.      Pulses: Normal pulses.      Heart sounds: Normal heart sounds. No murmur heard.     No friction rub.   Pulmonary:      Effort: Pulmonary effort is normal.      Breath sounds: Normal breath sounds.   Abdominal:      General: There is no distension.      Palpations: Abdomen is soft.     Neurological:      General: No focal deficit present.      Mental Status: He is alert.         Lab Results: I have reviewed the following results:CBC/BMP: No new results in last 24 hours.   Lab Results   Component Value Date    K 3.7 07/10/2025     07/10/2025    CO2 32 07/10/2025    BUN 19 07/10/2025    CREATININE 1.25 07/10/2025    GLUF 115 (H) 07/10/2025    CALCIUM 9.1 07/10/2025    CORRECTEDCA 8.8 07/02/2025    AST 13 07/10/2025    ALT 24 07/10/2025    ALKPHOS 69  07/10/2025    EGFR 60 07/10/2025     Lab Results   Component Value Date    WBC 4.69 07/10/2025    HGB 9.5 (L) 07/10/2025    HCT 28.7 (L) 07/10/2025    MCV 98 07/10/2025     07/10/2025     Lab Results   Component Value Date    NEUTROABS 1.99 07/10/2025       Imaging Results Review: No pertinent imaging studies reviewed.  Other Study Results Review: No additional pertinent studies reviewed.         [1]   No current facility-administered medications for this encounter.     Current Outpatient Medications   Medication Sig Dispense Refill    acetaminophen (TYLENOL) 325 mg tablet Take 2 tablets (650 mg total) by mouth every 6 (six) hours as needed for mild pain, moderate pain, severe pain, headaches or fever      allopurinol (ZYLOPRIM) 300 mg tablet Take 1 tablet (300 mg total) by mouth daily 30 tablet 0    atorvastatin (LIPITOR) 20 mg tablet Take 20 mg by mouth in the morning.      docusate sodium (COLACE) 100 mg capsule Take 1 capsule (100 mg total) by mouth 2 (two) times a day      omeprazole (PriLOSEC) 20 mg delayed release capsule Take 1 capsule (20 mg total) by mouth daily      ondansetron (ZOFRAN) 8 mg tablet Take 1 tablet (8 mg total) by mouth every 8 (eight) hours as needed for nausea or vomiting 20 tablet 0    QUEtiapine (SEROquel) 25 mg tablet Take 0.5 tablets (12.5 mg total) by mouth at noon and 0.5 tablets (12.5 mg total) before bedtime. 30 tablet 0    senna (SENOKOT) 8.6 mg Take 2 tablets (17.2 mg total) by mouth daily with lunch      SITaglip Phos-metFORMIN HCl ER (Janumet XR)  MG TB24 Take 1 tablet by mouth in the morning 90 tablet 1    sodium bicarbonate 650 mg tablet Take 1 tablet (650 mg total) by mouth 4 (four) times a day for 2 days Do not start before July 12, 2025. 8 tablet 0    temozolomide (TEMODAR) 20 mg capsule Take 2 capsules (40 mg total) by mouth daily On days 7 through 11 of each cycle 10 capsule 5    temozolomide (TEMODAR) 250 MG capsule Take 1 capsule (250 mg total) by mouth  daily On days 7 through 11 of each cycle 5 capsule 5

## 2025-07-14 NOTE — ASSESSMENT & PLAN NOTE
Lab Results   Component Value Date    HGBA1C 5.5 06/16/2025       Recent Labs     07/14/25  0938   POCGLU 91       Hold oral meds  Start insulin sliding scale

## 2025-07-14 NOTE — PROCEDURES
Venous Access Line Insertion    Date/Time: 7/14/2025 9:21 AM    Performed by: Wendie Thomason RN  Authorized by: Jorge Shaikh DO    Patient location:  Bedside  Other Assisting Provider: Yes (comment) (Maddie MARIN  Tech)    Consent:     Consent obtained:  Verbal (MD obtained from previous PICC insertion)    Consent given by:  Healthcare agent (daughter)    Risks discussed:  Arterial puncture, incorrect placement, nerve damage, bleeding, infection and pneumothorax    Alternatives discussed:  No treatment  Universal protocol:     Procedure explained and questions answered to patient or proxy's satisfaction: yes      Immediately prior to procedure, a time out was called: yes      Site/side marked: yes      Patient identity confirmed:  Verbally with patient, arm band, provided demographic data and hospital-assigned identification number  Pre-procedure details:     Hand hygiene: Hand hygiene performed prior to insertion      Sterile barrier technique: All elements of maximal sterile technique followed      Skin preparation:  ChloraPrep    Skin preparation agent: Skin preparation agent completely dried prior to procedure    Procedure details:     Complex Venous Access Line Type: PICC      Complex Venous Access Line Indications: chemotherapy      Catheter tip vessel location: atriocaval junction      Orientation:  Left    Location:  Basilic    Procedural supplies:  Double lumen    Catheter size:  5 Fr    Total catheter length (cm):  44    Catheter out on skin (cm):  0    Max flow rate:  999    Arm circumference:  30    Patient evaluated for contraindications to access (i.e. fistula, thrombosis, etc): Yes      Site selection rationale:  Right arm veins too small/brachial under nerve bundle    Approach: percutaneous technique used      Patient position:  Flat    Ultrasound image availability:  Not saved    Sterile ultrasound techniques: Sterile gel and sterile probe covers were used      Number of attempts:  1     Successful placement: yes      Landmarks identified: yes      Vessel of catheter tip end:  Sherlock 3CG confirmed  Anesthesia (see MAR for exact dosages):     Anesthesia method:  Local infiltration    Local anesthetic:  Lidocaine 1% w/o epi (2 ml)  Post-procedure details:     Post-procedure:  Dressing applied and securement device placed    Assessment:  Blood return through all ports and free fluid flow    Post-procedure complications: none      Patient tolerance of procedure:  Tolerated well, no immediate complications    Observer: Yes      Observer name:  Sister in room

## 2025-07-14 NOTE — ASSESSMENT & PLAN NOTE
Diagnosed on 4/14/2025  Has been receiving inpatient high-dose methotrexate and rituximab and outpatient temozolomide 90 mg a day on day 7-11  Directly admitted for initiation of cycle 6  Medical oncology consulted  Monitor electrolytes, replace magnesium  Monitor urine pH

## 2025-07-14 NOTE — CASE MANAGEMENT
Case Management Assessment & Discharge Planning Note    Patient name Alexis Dennison  Location [facility] 902/[facility] 902-01 MRN [id number]  : 1960 Date 2025       Current Admission Date: 2025  Current Admission Diagnosis:Primary CNS lymphoma   Patient Active Problem List    Diagnosis Date Noted    Dental infection 2025    UTI (urinary tract infection) 2025    Chemotherapy induced neutropenia (HCC) 2025    Ankle edema, bilateral 2025    SIRS (systemic inflammatory response syndrome) (HCC) 2025    Anemia 05/15/2025    Thrombocytopenia (HCC) 2025    Hyponatremia 05/10/2025    Impaired mobility and activities of daily living 2025    HTN (hypertension) 2025    Transaminitis 2025    Leukopenia 2025    At risk for acid-base imbalance 2025    Electrolyte imbalance risk 2025    Recurrent E. coli bacteremia 2025    Metabolic alkalosis 2025    LETY (acute kidney injury) (HCC) 2025    Goals of care, counseling/discussion 2025    Palliative care encounter 2025    Primary CNS lymphoma 2025    Encephalopathy 2025    Ambulatory dysfunction 2025    Thyroid nodule 2025    Pulmonary nodule 2025    Liver lesion 2025    Primary central nervous system (CNS) lymphoma 2025    Type 2 diabetes mellitus with hyperglycemia, without long-term current use of insulin (HCC) 2022    HTN, goal below 130/80 2022    Mixed hyperlipidemia 2022    Vitamin D deficiency 2022    NAFLD (nonalcoholic fatty liver disease) 2022      LOS (days): 0  Geometric Mean LOS (GMLOS) (days):   Days to GMLOS:     OBJECTIVE:  PATIENT READMITTED TO HOSPITAL  Risk of Unplanned Readmission Score: 32.63         Current admission status: Inpatient       Preferred Pharmacy:   [facility]/pharmacy #[id number] - [place] - [address]  [address]  [facility] [address]  Phone:  [phone] Fax: [phone]    [facility] - [place], [place] - [address]  [address]  [facility] [address]  Phone: [phone] Fax: [phone]    [facility] - [place], [place] - [address]  [address]  [address]  [place] [address]  Phone: [phone] Fax: [phone]    [facility] - [place], [place] - [address]  [address] [facility] [facility] [address]  Phone: [phone] Fax: [phone]    [facility] ([facility]) #[id number] - [place], [place] - [address] [address]  Phone: [phone] Fax: [phone]    [facility]  [place], [place] - [address]  [address]  [place] [address]  Phone: [phone] Fax: [phone]    Primary Care Provider: PATI Mckeon    Primary Insurance: BLUE CROSS  Secondary Insurance: CAPITAL    ASSESSMENT:  Active Health Care Proxies       [facility] Representative - Daughter   Primary Phone: [phone] (Home)                 Patient Information  Admitted from:: Home  Mental Status: Alert    DISCHARGE DETAILS:    Other Referral/Resources/Interventions Provided:  Referral Comments: Please see CM assessment done on 6/30/25.  30 day readmission for chemotherapy treatment.  CM to follow for any CM needs.  Anticipate home

## 2025-07-15 ENCOUNTER — APPOINTMENT (OUTPATIENT)
Facility: CLINIC | Age: 65
End: 2025-07-15
Payer: COMMERCIAL

## 2025-07-15 ENCOUNTER — APPOINTMENT (OUTPATIENT)
Dept: SPEECH THERAPY | Facility: CLINIC | Age: 65
End: 2025-07-15
Attending: NURSE PRACTITIONER
Payer: COMMERCIAL

## 2025-07-15 LAB
ALBUMIN SERPL BCG-MCNC: 3.7 G/DL (ref 3.5–5)
ALP SERPL-CCNC: 62 U/L (ref 34–104)
ALT SERPL W P-5'-P-CCNC: 19 U/L (ref 7–52)
ANION GAP SERPL CALCULATED.3IONS-SCNC: 4 MMOL/L (ref 4–13)
ANISOCYTOSIS BLD QL SMEAR: PRESENT
AST SERPL W P-5'-P-CCNC: 13 U/L (ref 13–39)
BACTERIA UR QL AUTO: ABNORMAL /HPF
BACTERIA UR QL AUTO: ABNORMAL /HPF
BACTERIA UR QL AUTO: NORMAL /HPF
BACTERIA UR QL AUTO: NORMAL /HPF
BASOPHILS # BLD MANUAL: 0 THOUSAND/UL (ref 0–0.1)
BASOPHILS NFR MAR MANUAL: 0 % (ref 0–1)
BILIRUB SERPL-MCNC: 0.66 MG/DL (ref 0.2–1)
BILIRUB UR QL STRIP: NEGATIVE
BUN SERPL-MCNC: 16 MG/DL (ref 5–25)
CALCIUM SERPL-MCNC: 8.9 MG/DL (ref 8.4–10.2)
CHLORIDE SERPL-SCNC: 102 MMOL/L (ref 96–108)
CLARITY UR: CLEAR
CO2 SERPL-SCNC: 32 MMOL/L (ref 21–32)
COLOR UR: ABNORMAL
COLOR UR: COLORLESS
COLOR UR: COLORLESS
COLOR UR: YELLOW
CREAT SERPL-MCNC: 0.98 MG/DL (ref 0.6–1.3)
EOSINOPHIL # BLD MANUAL: 0 THOUSAND/UL (ref 0–0.4)
EOSINOPHIL NFR BLD MANUAL: 0 % (ref 0–6)
ERYTHROCYTE [DISTWIDTH] IN BLOOD BY AUTOMATED COUNT: 14.8 % (ref 11.6–15.1)
FOLATE SERPL-MCNC: >22.3 NG/ML
GFR SERPL CREATININE-BSD FRML MDRD: 80 ML/MIN/1.73SQ M
GIANT PLATELETS BLD QL SMEAR: PRESENT
GLUCOSE SERPL-MCNC: 154 MG/DL (ref 65–140)
GLUCOSE SERPL-MCNC: 174 MG/DL (ref 65–140)
GLUCOSE SERPL-MCNC: 190 MG/DL (ref 65–140)
GLUCOSE SERPL-MCNC: 285 MG/DL (ref 65–140)
GLUCOSE SERPL-MCNC: 293 MG/DL (ref 65–140)
GLUCOSE UR STRIP-MCNC: ABNORMAL MG/DL
HCT VFR BLD AUTO: 27.3 % (ref 36.5–49.3)
HGB BLD-MCNC: 9.3 G/DL (ref 12–17)
HGB UR QL STRIP.AUTO: ABNORMAL
HGB UR QL STRIP.AUTO: ABNORMAL
HGB UR QL STRIP.AUTO: NEGATIVE
HGB UR QL STRIP.AUTO: NEGATIVE
KETONES UR STRIP-MCNC: NEGATIVE MG/DL
LEUKOCYTE ESTERASE UR QL STRIP: ABNORMAL
LEUKOCYTE ESTERASE UR QL STRIP: NEGATIVE
LYMPHOCYTES # BLD AUTO: 0.74 THOUSAND/UL (ref 0.6–4.47)
LYMPHOCYTES # BLD AUTO: 6 % (ref 14–44)
MAGNESIUM SERPL-MCNC: 1.9 MG/DL (ref 1.9–2.7)
MCH RBC QN AUTO: 32.2 PG (ref 26.8–34.3)
MCHC RBC AUTO-ENTMCNC: 34.1 G/DL (ref 31.4–37.4)
MCV RBC AUTO: 95 FL (ref 82–98)
MONOCYTES # BLD AUTO: 0.18 THOUSAND/UL (ref 0–1.22)
MONOCYTES NFR BLD: 2 % (ref 4–12)
NEUTROPHILS # BLD MANUAL: 8.29 THOUSAND/UL (ref 1.85–7.62)
NEUTS BAND NFR BLD MANUAL: 2 % (ref 0–8)
NEUTS SEG NFR BLD AUTO: 88 % (ref 43–75)
NITRITE UR QL STRIP: NEGATIVE
NON-SQ EPI CELLS URNS QL MICRO: ABNORMAL /HPF
NON-SQ EPI CELLS URNS QL MICRO: ABNORMAL /HPF
NON-SQ EPI CELLS URNS QL MICRO: NORMAL /HPF
NON-SQ EPI CELLS URNS QL MICRO: NORMAL /HPF
OVALOCYTES BLD QL SMEAR: PRESENT
PH UR STRIP.AUTO: 7.5 [PH]
PH UR STRIP.AUTO: 8 [PH]
PH UR STRIP.AUTO: 8 [PH]
PH UR STRIP.AUTO: 8.5 [PH]
PLATELET # BLD AUTO: 392 THOUSANDS/UL (ref 149–390)
PLATELET BLD QL SMEAR: ADEQUATE
PMV BLD AUTO: 9.9 FL (ref 8.9–12.7)
POIKILOCYTOSIS BLD QL SMEAR: PRESENT
POTASSIUM SERPL-SCNC: 3.8 MMOL/L (ref 3.5–5.3)
PROT SERPL-MCNC: 5.7 G/DL (ref 6.4–8.4)
PROT UR STRIP-MCNC: ABNORMAL MG/DL
PROT UR STRIP-MCNC: NEGATIVE MG/DL
RBC # BLD AUTO: 2.89 MILLION/UL (ref 3.88–5.62)
RBC #/AREA URNS AUTO: ABNORMAL /HPF
RBC #/AREA URNS AUTO: ABNORMAL /HPF
RBC #/AREA URNS AUTO: NORMAL /HPF
RBC #/AREA URNS AUTO: NORMAL /HPF
RBC MORPH BLD: PRESENT
SODIUM SERPL-SCNC: 138 MMOL/L (ref 135–147)
SP GR UR STRIP.AUTO: 1 (ref 1–1.03)
SP GR UR STRIP.AUTO: 1.01 (ref 1–1.03)
UROBILINOGEN UR STRIP-ACNC: <2 MG/DL
VARIANT LYMPHS # BLD AUTO: 2 %
VIT B12 SERPL-MCNC: 379 PG/ML (ref 180–914)
WBC # BLD AUTO: 9.21 THOUSAND/UL (ref 4.31–10.16)
WBC #/AREA URNS AUTO: ABNORMAL /HPF
WBC #/AREA URNS AUTO: ABNORMAL /HPF
WBC #/AREA URNS AUTO: NORMAL /HPF
WBC #/AREA URNS AUTO: NORMAL /HPF

## 2025-07-15 PROCEDURE — 85007 BL SMEAR W/DIFF WBC COUNT: CPT | Performed by: INTERNAL MEDICINE

## 2025-07-15 PROCEDURE — 83735 ASSAY OF MAGNESIUM: CPT | Performed by: INTERNAL MEDICINE

## 2025-07-15 PROCEDURE — 80053 COMPREHEN METABOLIC PANEL: CPT | Performed by: INTERNAL MEDICINE

## 2025-07-15 PROCEDURE — 85027 COMPLETE CBC AUTOMATED: CPT | Performed by: INTERNAL MEDICINE

## 2025-07-15 PROCEDURE — 82746 ASSAY OF FOLIC ACID SERUM: CPT | Performed by: INTERNAL MEDICINE

## 2025-07-15 PROCEDURE — 99232 SBSQ HOSP IP/OBS MODERATE 35: CPT | Performed by: INTERNAL MEDICINE

## 2025-07-15 PROCEDURE — 80204 DRUG ASSAY METHOTREXATE: CPT | Performed by: INTERNAL MEDICINE

## 2025-07-15 PROCEDURE — 81001 URINALYSIS AUTO W/SCOPE: CPT | Performed by: INTERNAL MEDICINE

## 2025-07-15 PROCEDURE — 82607 VITAMIN B-12: CPT | Performed by: INTERNAL MEDICINE

## 2025-07-15 PROCEDURE — 82948 REAGENT STRIP/BLOOD GLUCOSE: CPT

## 2025-07-15 RX ORDER — LORAZEPAM 2 MG/ML
1 INJECTION INTRAMUSCULAR ONCE
Status: DISCONTINUED | OUTPATIENT
Start: 2025-07-15 | End: 2025-07-15

## 2025-07-15 RX ADMIN — QUETIAPINE 12.5 MG: 25 TABLET ORAL at 09:14

## 2025-07-15 RX ADMIN — INSULIN LISPRO 1 UNITS: 100 INJECTION, SOLUTION INTRAVENOUS; SUBCUTANEOUS at 09:15

## 2025-07-15 RX ADMIN — ALLOPURINOL 300 MG: 300 TABLET ORAL at 09:14

## 2025-07-15 RX ADMIN — LEUCOVORIN CALCIUM 25 MG: 50 INJECTION, POWDER, LYOPHILIZED, FOR SOLUTION INTRAMUSCULAR; INTRAVENOUS at 21:40

## 2025-07-15 RX ADMIN — ENOXAPARIN SODIUM 40 MG: 40 INJECTION SUBCUTANEOUS at 09:14

## 2025-07-15 RX ADMIN — ATORVASTATIN CALCIUM 20 MG: 20 TABLET, FILM COATED ORAL at 09:14

## 2025-07-15 RX ADMIN — INSULIN LISPRO 2 UNITS: 100 INJECTION, SOLUTION INTRAVENOUS; SUBCUTANEOUS at 21:40

## 2025-07-15 RX ADMIN — SENNOSIDES 17.2 MG: 8.6 TABLET, FILM COATED ORAL at 12:37

## 2025-07-15 RX ADMIN — QUETIAPINE 12.5 MG: 25 TABLET ORAL at 21:40

## 2025-07-15 RX ADMIN — SODIUM BICARBONATE 150 ML/HR: 84 INJECTION, SOLUTION INTRAVENOUS at 10:33

## 2025-07-15 RX ADMIN — INSULIN LISPRO 1 UNITS: 100 INJECTION, SOLUTION INTRAVENOUS; SUBCUTANEOUS at 12:37

## 2025-07-15 RX ADMIN — DOCUSATE SODIUM 100 MG: 100 CAPSULE, LIQUID FILLED ORAL at 21:40

## 2025-07-15 RX ADMIN — PANTOPRAZOLE SODIUM 40 MG: 40 TABLET, DELAYED RELEASE ORAL at 06:13

## 2025-07-15 RX ADMIN — DOCUSATE SODIUM 100 MG: 100 CAPSULE, LIQUID FILLED ORAL at 09:14

## 2025-07-15 RX ADMIN — INSULIN LISPRO 3 UNITS: 100 INJECTION, SOLUTION INTRAVENOUS; SUBCUTANEOUS at 17:02

## 2025-07-15 RX ADMIN — LEUCOVORIN CALCIUM 25 MG: 50 INJECTION, POWDER, LYOPHILIZED, FOR SOLUTION INTRAMUSCULAR; INTRAVENOUS at 15:51

## 2025-07-15 NOTE — PLAN OF CARE
Problem: Knowledge Deficit  Goal: Patient/family/caregiver demonstrates understanding of disease process, treatment plan, medications, and discharge instructions  Description: Complete learning assessment and assess knowledge base.  Interventions:  - Provide teaching at level of understanding  - Provide teaching via preferred learning methods  7/14/2025 2046 by Arsh Roblero RN  Outcome: Progressing  7/14/2025 2046 by Arsh Roblero RN  Outcome: Progressing

## 2025-07-16 ENCOUNTER — TELEPHONE (OUTPATIENT)
Age: 65
End: 2025-07-16

## 2025-07-16 PROBLEM — N39.0 UTI (URINARY TRACT INFECTION): Status: RESOLVED | Noted: 2025-06-16 | Resolved: 2025-07-16

## 2025-07-16 LAB
ANION GAP SERPL CALCULATED.3IONS-SCNC: 7 MMOL/L (ref 4–13)
BACTERIA UR QL AUTO: NORMAL /HPF
BASOPHILS # BLD AUTO: 0.03 THOUSANDS/ÂΜL (ref 0–0.1)
BASOPHILS NFR BLD AUTO: 0 % (ref 0–1)
BILIRUB UR QL STRIP: NEGATIVE
BUN SERPL-MCNC: 12 MG/DL (ref 5–25)
CALCIUM SERPL-MCNC: 8.3 MG/DL (ref 8.4–10.2)
CHLORIDE SERPL-SCNC: 103 MMOL/L (ref 96–108)
CLARITY UR: CLEAR
CO2 SERPL-SCNC: 33 MMOL/L (ref 21–32)
COLOR UR: ABNORMAL
COLOR UR: COLORLESS
CREAT SERPL-MCNC: 0.95 MG/DL (ref 0.6–1.3)
EOSINOPHIL # BLD AUTO: 0.08 THOUSAND/ÂΜL (ref 0–0.61)
EOSINOPHIL NFR BLD AUTO: 1 % (ref 0–6)
ERYTHROCYTE [DISTWIDTH] IN BLOOD BY AUTOMATED COUNT: 15.2 % (ref 11.6–15.1)
GFR SERPL CREATININE-BSD FRML MDRD: 83 ML/MIN/1.73SQ M
GLUCOSE SERPL-MCNC: 115 MG/DL (ref 65–140)
GLUCOSE SERPL-MCNC: 122 MG/DL (ref 65–140)
GLUCOSE SERPL-MCNC: 132 MG/DL (ref 65–140)
GLUCOSE SERPL-MCNC: 163 MG/DL (ref 65–140)
GLUCOSE SERPL-MCNC: 178 MG/DL (ref 65–140)
GLUCOSE UR STRIP-MCNC: ABNORMAL MG/DL
GLUCOSE UR STRIP-MCNC: NEGATIVE MG/DL
HCT VFR BLD AUTO: 27.8 % (ref 36.5–49.3)
HGB BLD-MCNC: 9 G/DL (ref 12–17)
HGB UR QL STRIP.AUTO: NEGATIVE
IMM GRANULOCYTES # BLD AUTO: 0.02 THOUSAND/UL (ref 0–0.2)
IMM GRANULOCYTES NFR BLD AUTO: 0 % (ref 0–2)
KETONES UR STRIP-MCNC: NEGATIVE MG/DL
LEUKOCYTE ESTERASE UR QL STRIP: ABNORMAL
LEUKOCYTE ESTERASE UR QL STRIP: NEGATIVE
LYMPHOCYTES # BLD AUTO: 2.15 THOUSANDS/ÂΜL (ref 0.6–4.47)
LYMPHOCYTES NFR BLD AUTO: 32 % (ref 14–44)
MCH RBC QN AUTO: 32 PG (ref 26.8–34.3)
MCHC RBC AUTO-ENTMCNC: 32.4 G/DL (ref 31.4–37.4)
MCV RBC AUTO: 99 FL (ref 82–98)
MONOCYTES # BLD AUTO: 0.38 THOUSAND/ÂΜL (ref 0.17–1.22)
MONOCYTES NFR BLD AUTO: 6 % (ref 4–12)
MTX SERPL-SCNC: 5.5 UMOL/L
NEUTROPHILS # BLD AUTO: 4.12 THOUSANDS/ÂΜL (ref 1.85–7.62)
NEUTS SEG NFR BLD AUTO: 61 % (ref 43–75)
NITRITE UR QL STRIP: NEGATIVE
NON-SQ EPI CELLS URNS QL MICRO: NORMAL /HPF
NRBC BLD AUTO-RTO: 0 /100 WBCS
PH UR STRIP.AUTO: 7.5 [PH]
PH UR STRIP.AUTO: 8 [PH]
PLATELET # BLD AUTO: 330 THOUSANDS/UL (ref 149–390)
PMV BLD AUTO: 10.3 FL (ref 8.9–12.7)
POTASSIUM SERPL-SCNC: 3.2 MMOL/L (ref 3.5–5.3)
PROT UR STRIP-MCNC: NEGATIVE MG/DL
RBC # BLD AUTO: 2.81 MILLION/UL (ref 3.88–5.62)
RBC #/AREA URNS AUTO: NORMAL /HPF
SODIUM SERPL-SCNC: 143 MMOL/L (ref 135–147)
SP GR UR STRIP.AUTO: 1 (ref 1–1.03)
SP GR UR STRIP.AUTO: 1.01 (ref 1–1.03)
UROBILINOGEN UR STRIP-ACNC: <2 MG/DL
WBC # BLD AUTO: 6.78 THOUSAND/UL (ref 4.31–10.16)
WBC #/AREA URNS AUTO: NORMAL /HPF

## 2025-07-16 PROCEDURE — 80048 BASIC METABOLIC PNL TOTAL CA: CPT | Performed by: INTERNAL MEDICINE

## 2025-07-16 PROCEDURE — 81001 URINALYSIS AUTO W/SCOPE: CPT | Performed by: INTERNAL MEDICINE

## 2025-07-16 PROCEDURE — 85025 COMPLETE CBC W/AUTO DIFF WBC: CPT | Performed by: INTERNAL MEDICINE

## 2025-07-16 PROCEDURE — 82948 REAGENT STRIP/BLOOD GLUCOSE: CPT

## 2025-07-16 PROCEDURE — 99232 SBSQ HOSP IP/OBS MODERATE 35: CPT | Performed by: INTERNAL MEDICINE

## 2025-07-16 PROCEDURE — 80204 DRUG ASSAY METHOTREXATE: CPT | Performed by: INTERNAL MEDICINE

## 2025-07-16 RX ADMIN — DOCUSATE SODIUM 100 MG: 100 CAPSULE, LIQUID FILLED ORAL at 08:25

## 2025-07-16 RX ADMIN — SODIUM BICARBONATE 150 ML/HR: 84 INJECTION, SOLUTION INTRAVENOUS at 11:03

## 2025-07-16 RX ADMIN — PANTOPRAZOLE SODIUM 40 MG: 40 TABLET, DELAYED RELEASE ORAL at 06:19

## 2025-07-16 RX ADMIN — LEUCOVORIN CALCIUM 25 MG: 50 INJECTION, POWDER, LYOPHILIZED, FOR SOLUTION INTRAMUSCULAR; INTRAVENOUS at 09:20

## 2025-07-16 RX ADMIN — ALLOPURINOL 300 MG: 300 TABLET ORAL at 08:25

## 2025-07-16 RX ADMIN — DOCUSATE SODIUM 100 MG: 100 CAPSULE, LIQUID FILLED ORAL at 22:16

## 2025-07-16 RX ADMIN — SODIUM BICARBONATE 150 ML/HR: 84 INJECTION, SOLUTION INTRAVENOUS at 21:00

## 2025-07-16 RX ADMIN — QUETIAPINE 12.5 MG: 25 TABLET ORAL at 22:16

## 2025-07-16 RX ADMIN — LEUCOVORIN CALCIUM 25 MG: 50 INJECTION, POWDER, LYOPHILIZED, FOR SOLUTION INTRAMUSCULAR; INTRAVENOUS at 02:51

## 2025-07-16 RX ADMIN — ATORVASTATIN CALCIUM 20 MG: 20 TABLET, FILM COATED ORAL at 08:25

## 2025-07-16 RX ADMIN — INSULIN LISPRO 1 UNITS: 100 INJECTION, SOLUTION INTRAVENOUS; SUBCUTANEOUS at 22:17

## 2025-07-16 RX ADMIN — INSULIN LISPRO 1 UNITS: 100 INJECTION, SOLUTION INTRAVENOUS; SUBCUTANEOUS at 16:49

## 2025-07-16 RX ADMIN — QUETIAPINE 12.5 MG: 25 TABLET ORAL at 08:25

## 2025-07-16 RX ADMIN — SENNOSIDES 17.2 MG: 8.6 TABLET, FILM COATED ORAL at 11:03

## 2025-07-16 RX ADMIN — ENOXAPARIN SODIUM 40 MG: 40 INJECTION SUBCUTANEOUS at 08:25

## 2025-07-16 RX ADMIN — LEUCOVORIN CALCIUM 25 MG: 50 INJECTION, POWDER, LYOPHILIZED, FOR SOLUTION INTRAMUSCULAR; INTRAVENOUS at 15:40

## 2025-07-16 RX ADMIN — LEUCOVORIN CALCIUM 25 MG: 50 INJECTION, POWDER, LYOPHILIZED, FOR SOLUTION INTRAMUSCULAR; INTRAVENOUS at 23:15

## 2025-07-16 NOTE — TELEPHONE ENCOUNTER
Called and spoke to patient's daughter, Elizabeth. There was confusion on whether Alexis's treatment was every 2 weeks or 4 weeks. He was initially scheduled as every 2 weeks by another oncologist, but Dr. Phan wanted him to really be every 4 weeks, with admissions on day 1 and 15. Since this current admission would have technically been day 15, Alexis does not need the temodar, and will no longer need it anymore since this is his last treatment. I apologized for the confusion. Elizabeth voiced understanding.

## 2025-07-16 NOTE — PROGRESS NOTES
Progress Note - Hospitalist   Name: Alexis Dennison 64 y.o. male I MRN: [id number]  Unit/Bed#: [facility] 902-01 I Date of Admission: 7/14/2025   Date of Service: 7/16/2025 I Hospital Day: 2    Assessment & Plan  Primary CNS lymphoma  Diagnosed on 4/14/2025  Has been receiving inpatient high-dose methotrexate and rituximab and outpatient temozolomide 90 mg a day on day 7-11  Directly admitted for initiation of cycle 6  Medical oncology consulted  Monitor electrolytes, replace magnesium  Monitor urine pH   follow-up oncology recommendations  Type 2 diabetes mellitus with hyperglycemia, without long-term current use of insulin (HCC)  Lab Results   Component Value Date    HGBA1C 5.5 06/16/2025       Recent Labs     07/15/25  1115 07/15/25  1629 07/15/25  2102 07/16/25  0703   POCGLU 154* 293* 285* 122       Hold oral meds  Continue sliding scale insulin  Monitor blood glucose    HTN (hypertension)  Not on hypertensive meds  BP controlled  Anemia  Recent iron studies reviewed  Hemoglobin remained stable at approximately 9  No overt bleeding  Continue to monitor    VTE Pharmacologic Prophylaxis: VTE Score: 7 High Risk (Score >/= 5) - Pharmacological DVT Prophylaxis Ordered: enoxaparin (Lovenox). Sequential Compression Devices Ordered.    Mobility:   Basic Mobility Inpatient Raw Score: 19  JH-HLM Goal: 6: Walk 10 steps or more  JH-HLM Achieved: 8: Walk 250 feet ot more  JH-HLM Goal achieved. Continue to encourage appropriate mobility.    Patient Centered Rounds: I performed bedside rounds with nursing staff today.   Discussions with Specialists or Other Care Team Provider: cm, nursing    Education and Discussions with Family / Patient: Patient declined call to .     Current Length of Stay: 2 day(s)  Current Patient Status: Inpatient   Certification Statement: The patient will continue to require additional inpatient hospital stay due to see  below  Discharge Plan:  Still requiring chemotherapy    Code Status: Level 1  - Full Code    Subjective   Currently endorses no acute complaints    Objective :  Temp:  [98 °F (36.7 °C)-98.5 °F (36.9 °C)] 98.1 °F (36.7 °C)  HR:  [59-69] 59  BP: (119-138)/(65-83) 119/71  Resp:  [16-20] 16  SpO2:  [95 %-100 %] 100 %  O2 Device: None (Room air)    Body mass index is 25.62 kg/m².     Input and Output Summary (last 24 hours):     Intake/Output Summary (Last 24 hours) at 7/16/2025 1020  Last data filed at 7/16/2025 0601  Gross per 24 hour   Intake 3329.67 ml   Output 4250 ml   Net -920.33 ml       Physical Exam  Constitutional:       General: He is not in acute distress.     Appearance: He is well-developed. He is not diaphoretic.   HENT:      Head: Normocephalic and atraumatic.      Nose: Nose normal.      Mouth/Throat:      Pharynx: No oropharyngeal exudate.     Eyes:      General: No scleral icterus.     Conjunctiva/sclera: Conjunctivae normal.       Cardiovascular:      Rate and Rhythm: Normal rate and regular rhythm.      Heart sounds: Normal heart sounds. No murmur heard.     No friction rub. No gallop.   Pulmonary:      Effort: Pulmonary effort is normal. No respiratory distress.      Breath sounds: Normal breath sounds. No wheezing or rales.   Chest:      Chest wall: No tenderness.   Abdominal:      General: Bowel sounds are normal. There is no distension.      Palpations: Abdomen is soft.      Tenderness: There is no abdominal tenderness. There is no guarding.     Musculoskeletal:         General: No tenderness or deformity. Normal range of motion.      Cervical back: Normal range of motion and neck supple.     Skin:     General: Skin is warm and dry.      Findings: No erythema.     Neurological:      Mental Status: He is alert. Mental status is at baseline.           Lines/Drains:  Lines/Drains/Airways       Active Status       Name Placement date Placement time Site Days    PICC Line 07/14/25 Left Basilic 07/14/25  0923  Basilic  2    Urethral Catheter Coude 16 Fr. 07/14/25  1246  Coude   1                  Urinary Catheter:  Goal for removal: N/A - Chronic Dupont         Central Line:  Goal for removal: Port accessed. Will de-access as appropriate.               Lab Results: I have reviewed the following results:   Results from last 7 days   Lab Units 07/16/25  0628 07/15/25  0619   WBC Thousand/uL 6.78 9.21   HEMOGLOBIN g/dL 9.0* 9.3*   HEMATOCRIT % 27.8* 27.3*   PLATELETS Thousands/uL 330 392*   BANDS PCT %  --  2   SEGS PCT % 61  --    LYMPHO PCT % 32 6*   MONO PCT % 6 2*   EOS PCT % 1 0     Results from last 7 days   Lab Units 07/16/25  0628 07/15/25  0619   SODIUM mmol/L 143 138   POTASSIUM mmol/L 3.2* 3.8   CHLORIDE mmol/L 103 102   CO2 mmol/L 33* 32   BUN mg/dL 12 16   CREATININE mg/dL 0.95 0.98   ANION GAP mmol/L 7 4   CALCIUM mg/dL 8.3* 8.9   ALBUMIN g/dL  --  3.7   TOTAL BILIRUBIN mg/dL  --  0.66   ALK PHOS U/L  --  62   ALT U/L  --  19   AST U/L  --  13   GLUCOSE RANDOM mg/dL 132 190*     Results from last 7 days   Lab Units 07/14/25  0932   INR  1.01     Results from last 7 days   Lab Units 07/16/25  0703 07/15/25  2102 07/15/25  1629 07/15/25  1115 07/15/25  0713 07/14/25  2049 07/14/25  1637 07/14/25  1211 07/14/25  0938   POC GLUCOSE mg/dl 122 285* 293* 154* 174* 355* 159* 107 91               Recent Cultures (last 7 days):         Imaging Results Review: I personally reviewed the following image studies/reports in PACS and discussed pertinent findings with Radiology: chest xray. My interpretation of the radiology images/reports is:  .  Other Study Results Review: EKG was reviewed.     Last 24 Hours Medication List:     Current Facility-Administered Medications:     acetaminophen (TYLENOL) tablet 650 mg, Q6H PRN    allopurinol (ZYLOPRIM) tablet 300 mg, Daily    alteplase (CATHFLO) injection 2 mg, Q1MIN PRN    alteplase (CATHFLO) injection 2 mg, Q1MIN PRN    atorvastatin (LIPITOR) tablet 20 mg, Daily    docusate sodium (COLACE) capsule 100 mg, BID    enoxaparin (LOVENOX) subcutaneous  injection 40 mg, Daily    insulin lispro (HumALOG/ADMELOG) 100 units/mL subcutaneous injection 1-5 Units, TID AC **AND** Fingerstick Glucose (POCT), TID AC    insulin lispro (HumALOG/ADMELOG) 100 units/mL subcutaneous injection 1-5 Units, HS    leucovorin 25 mg in sodium chloride 0.9 % 50 mL IVPB, Q6H, Last Rate: 25 mg (07/16/25 0920)    pantoprazole (PROTONIX) EC tablet 40 mg, Daily Before Breakfast    QUEtiapine (SEROquel) tablet 12.5 mg, BID    senna (SENOKOT) tablet 17.2 mg, Daily With Lunch    sodium bicarbonate 100 mEq in dextrose 5 % 1,000 mL infusion, Continuous, Last Rate: 150 mL/hr (07/15/25 1400)    sodium chloride 0.9 % infusion, Once PRN, Last Rate: 20 mL/hr (07/15/25 1400)    Administrative Statements   Today, Patient Was Seen By: Renato Georges MD  I have spent a total time of 30 minutes in caring for this patient on the day of the visit/encounter including Diagnostic results.    **Please Note: This note may have been constructed using a voice recognition system.**

## 2025-07-16 NOTE — ASSESSMENT & PLAN NOTE
Lab Results   Component Value Date    HGBA1C 5.5 06/16/2025       Recent Labs     07/15/25  1115 07/15/25  1629 07/15/25  2102 07/16/25  0703   POCGLU 154* 293* 285* 122       Hold oral meds  Continue sliding scale insulin  Monitor blood glucose

## 2025-07-16 NOTE — ASSESSMENT & PLAN NOTE
Recent iron studies reviewed  Hemoglobin remained stable at approximately 9  No overt bleeding  Continue to monitor

## 2025-07-16 NOTE — ASSESSMENT & PLAN NOTE
Diagnosed on 4/14/2025  Has been receiving inpatient high-dose methotrexate and rituximab and outpatient temozolomide 90 mg a day on day 7-11  Directly admitted for initiation of cycle 6  Medical oncology consulted  Monitor electrolytes, replace magnesium  Monitor urine pH   follow-up oncology recommendations

## 2025-07-17 ENCOUNTER — APPOINTMENT (OUTPATIENT)
Facility: CLINIC | Age: 65
End: 2025-07-17
Payer: COMMERCIAL

## 2025-07-17 LAB
ANION GAP SERPL CALCULATED.3IONS-SCNC: 6 MMOL/L (ref 4–13)
BASOPHILS # BLD AUTO: 0.03 THOUSANDS/ÂΜL (ref 0–0.1)
BASOPHILS NFR BLD AUTO: 1 % (ref 0–1)
BILIRUB UR QL STRIP: NEGATIVE
BUN SERPL-MCNC: 11 MG/DL (ref 5–25)
CALCIUM SERPL-MCNC: 7.9 MG/DL (ref 8.4–10.2)
CHLORIDE SERPL-SCNC: 96 MMOL/L (ref 96–108)
CLARITY UR: CLEAR
CO2 SERPL-SCNC: 37 MMOL/L (ref 21–32)
COLOR UR: COLORLESS
CREAT SERPL-MCNC: 0.88 MG/DL (ref 0.6–1.3)
EOSINOPHIL # BLD AUTO: 0.18 THOUSAND/ÂΜL (ref 0–0.61)
EOSINOPHIL NFR BLD AUTO: 4 % (ref 0–6)
ERYTHROCYTE [DISTWIDTH] IN BLOOD BY AUTOMATED COUNT: 14.9 % (ref 11.6–15.1)
GFR SERPL CREATININE-BSD FRML MDRD: 90 ML/MIN/1.73SQ M
GLUCOSE SERPL-MCNC: 108 MG/DL (ref 65–140)
GLUCOSE SERPL-MCNC: 117 MG/DL (ref 65–140)
GLUCOSE SERPL-MCNC: 175 MG/DL (ref 65–140)
GLUCOSE SERPL-MCNC: 202 MG/DL (ref 65–140)
GLUCOSE SERPL-MCNC: 389 MG/DL (ref 65–140)
GLUCOSE UR STRIP-MCNC: ABNORMAL MG/DL
GLUCOSE UR STRIP-MCNC: ABNORMAL MG/DL
GLUCOSE UR STRIP-MCNC: NEGATIVE MG/DL
GLUCOSE UR STRIP-MCNC: NEGATIVE MG/DL
HCT VFR BLD AUTO: 25.5 % (ref 36.5–49.3)
HGB BLD-MCNC: 8.3 G/DL (ref 12–17)
HGB UR QL STRIP.AUTO: NEGATIVE
IMM GRANULOCYTES # BLD AUTO: 0.02 THOUSAND/UL (ref 0–0.2)
IMM GRANULOCYTES NFR BLD AUTO: 1 % (ref 0–2)
KETONES UR STRIP-MCNC: NEGATIVE MG/DL
LEUKOCYTE ESTERASE UR QL STRIP: NEGATIVE
LYMPHOCYTES # BLD AUTO: 1.37 THOUSANDS/ÂΜL (ref 0.6–4.47)
LYMPHOCYTES NFR BLD AUTO: 31 % (ref 14–44)
MCH RBC QN AUTO: 31.8 PG (ref 26.8–34.3)
MCHC RBC AUTO-ENTMCNC: 32.5 G/DL (ref 31.4–37.4)
MCV RBC AUTO: 98 FL (ref 82–98)
MONOCYTES # BLD AUTO: 0.16 THOUSAND/ÂΜL (ref 0.17–1.22)
MONOCYTES NFR BLD AUTO: 4 % (ref 4–12)
NEUTROPHILS # BLD AUTO: 2.65 THOUSANDS/ÂΜL (ref 1.85–7.62)
NEUTS SEG NFR BLD AUTO: 59 % (ref 43–75)
NITRITE UR QL STRIP: NEGATIVE
NRBC BLD AUTO-RTO: 0 /100 WBCS
PH UR STRIP.AUTO: 8 [PH]
PH UR STRIP.AUTO: 8.5 [PH]
PLATELET # BLD AUTO: 309 THOUSANDS/UL (ref 149–390)
PMV BLD AUTO: 9.8 FL (ref 8.9–12.7)
POTASSIUM SERPL-SCNC: 3.3 MMOL/L (ref 3.5–5.3)
PROT UR STRIP-MCNC: NEGATIVE MG/DL
RBC # BLD AUTO: 2.61 MILLION/UL (ref 3.88–5.62)
SODIUM SERPL-SCNC: 139 MMOL/L (ref 135–147)
SP GR UR STRIP.AUTO: 1 (ref 1–1.03)
SP GR UR STRIP.AUTO: 1 (ref 1–1.03)
SP GR UR STRIP.AUTO: 1.01 (ref 1–1.03)
SP GR UR STRIP.AUTO: 1.01 (ref 1–1.03)
UROBILINOGEN UR STRIP-ACNC: <2 MG/DL
WBC # BLD AUTO: 4.41 THOUSAND/UL (ref 4.31–10.16)

## 2025-07-17 PROCEDURE — 99232 SBSQ HOSP IP/OBS MODERATE 35: CPT | Performed by: INTERNAL MEDICINE

## 2025-07-17 PROCEDURE — 82948 REAGENT STRIP/BLOOD GLUCOSE: CPT

## 2025-07-17 PROCEDURE — 80048 BASIC METABOLIC PNL TOTAL CA: CPT | Performed by: INTERNAL MEDICINE

## 2025-07-17 PROCEDURE — 85025 COMPLETE CBC W/AUTO DIFF WBC: CPT | Performed by: INTERNAL MEDICINE

## 2025-07-17 PROCEDURE — 81003 URINALYSIS AUTO W/O SCOPE: CPT | Performed by: INTERNAL MEDICINE

## 2025-07-17 PROCEDURE — 80204 DRUG ASSAY METHOTREXATE: CPT | Performed by: INTERNAL MEDICINE

## 2025-07-17 RX ORDER — POTASSIUM CHLORIDE 1500 MG/1
40 TABLET, EXTENDED RELEASE ORAL ONCE
Status: COMPLETED | OUTPATIENT
Start: 2025-07-17 | End: 2025-07-17

## 2025-07-17 RX ORDER — LEUCOVORIN CALCIUM 25 MG/1
25 TABLET ORAL EVERY 6 HOURS
Status: DISPENSED | OUTPATIENT
Start: 2025-07-17 | End: 2025-07-18

## 2025-07-17 RX ADMIN — SODIUM BICARBONATE 150 ML/HR: 84 INJECTION, SOLUTION INTRAVENOUS at 04:56

## 2025-07-17 RX ADMIN — ENOXAPARIN SODIUM 40 MG: 40 INJECTION SUBCUTANEOUS at 07:55

## 2025-07-17 RX ADMIN — LEUCOVORIN CALCIUM 25 MG: 25 TABLET ORAL at 23:33

## 2025-07-17 RX ADMIN — QUETIAPINE 12.5 MG: 25 TABLET ORAL at 07:55

## 2025-07-17 RX ADMIN — INSULIN LISPRO 1 UNITS: 100 INJECTION, SOLUTION INTRAVENOUS; SUBCUTANEOUS at 16:10

## 2025-07-17 RX ADMIN — ATORVASTATIN CALCIUM 20 MG: 20 TABLET, FILM COATED ORAL at 07:55

## 2025-07-17 RX ADMIN — PANTOPRAZOLE SODIUM 40 MG: 40 TABLET, DELAYED RELEASE ORAL at 06:06

## 2025-07-17 RX ADMIN — LEUCOVORIN CALCIUM 25 MG: 25 TABLET ORAL at 15:16

## 2025-07-17 RX ADMIN — QUETIAPINE 12.5 MG: 25 TABLET ORAL at 21:00

## 2025-07-17 RX ADMIN — SENNOSIDES 17.2 MG: 8.6 TABLET, FILM COATED ORAL at 11:12

## 2025-07-17 RX ADMIN — DOCUSATE SODIUM 100 MG: 100 CAPSULE, LIQUID FILLED ORAL at 07:55

## 2025-07-17 RX ADMIN — INSULIN LISPRO 1 UNITS: 100 INJECTION, SOLUTION INTRAVENOUS; SUBCUTANEOUS at 21:01

## 2025-07-17 RX ADMIN — SODIUM BICARBONATE 150 ML/HR: 84 INJECTION, SOLUTION INTRAVENOUS at 20:58

## 2025-07-17 RX ADMIN — ALLOPURINOL 300 MG: 300 TABLET ORAL at 07:55

## 2025-07-17 RX ADMIN — POTASSIUM CHLORIDE 40 MEQ: 1500 TABLET, EXTENDED RELEASE ORAL at 11:11

## 2025-07-17 RX ADMIN — DOCUSATE SODIUM 100 MG: 100 CAPSULE, LIQUID FILLED ORAL at 21:00

## 2025-07-17 RX ADMIN — SODIUM BICARBONATE 150 ML/HR: 84 INJECTION, SOLUTION INTRAVENOUS at 13:30

## 2025-07-17 NOTE — ASSESSMENT & PLAN NOTE
Diagnosed on 4/14/2025  Has been receiving inpatient high-dose methotrexate and rituximab and outpatient temozolomide 90 mg a day on day 7-11  Directly admitted for initiation of cycle 6  Medical oncology consulted  Follow-up further oncology recs

## 2025-07-17 NOTE — PLAN OF CARE
Problem: Knowledge Deficit  Goal: Patient/family/caregiver demonstrates understanding of disease process, treatment plan, medications, and discharge instructions  Description: Complete learning assessment and assess knowledge base.  Interventions:  - Provide teaching at level of understanding  - Provide teaching via preferred learning methods  Outcome: Progressing     Problem: Prexisting or High Potential for Compromised Skin Integrity  Goal: Skin integrity is maintained or improved  Description: INTERVENTIONS:  - Identify patients at risk for skin breakdown  - Assess and monitor skin integrity including under and around medical devices   - Assess and monitor nutrition and hydration status  - Monitor labs  - Assess for incontinence   - Turn and reposition patient  - Assist with mobility/ambulation  - Relieve pressure over antonieta prominences   - Avoid friction and shearing  - Provide appropriate hygiene as needed including keeping skin clean and dry  - Evaluate need for skin moisturizer/barrier cream  - Collaborate with interdisciplinary team  - Patient/family teaching  - Consider wound care consult    Assess:  - Review [place] scale daily  - Clean and moisturize skin every   - Inspect skin when repositioning, toileting, and assisting with ADLS  - Assess under medical devices such as  every   - Assess extremities for adequate circulation and sensation     Bed Management:  - Have minimal linens on bed & keep smooth, unwrinkled  - Change linens as needed when moist or perspiring  - Avoid sitting or lying in one position for more than hours while in bed?Keep HOB at degrees   - Toileting:  - Offer bedside commode  - Assess for incontinence every - Use incontinent care products after each incontinent episode such as     Activity:  - Mobilize patient  times a day  - Encourage activity and walks on unit  - Encourage or provide ROM exercises   - Turn and reposition patient every 2 Hours  - Use appropriate equipment to lift  or move patient in bed  - Instruct/ Assist with weight shifting every  when out of bed in chair  - Consider limitation of chair time  hour intervals    Skin Care:  - Avoid use of baby powder, tape, friction and shearing, hot water or constrictive clothing  - Relieve pressure over bony prominences using   - Do not massage red bony areas    Next Steps:  - Teach patient strategies to minimize risks such as   - Consider consults to  interdisciplinary teams such as   Outcome: Progressing

## 2025-07-17 NOTE — PROGRESS NOTES
Progress Note - Hospitalist   Name: Alexis Dennison 64 y.o. 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  Has been receiving inpatient high-dose methotrexate and rituximab and outpatient temozolomide 90 mg a day on day 7-11  Directly admitted for initiation of cycle 6  Medical oncology consulted  Follow-up further oncology recs  Type 2 diabetes mellitus with hyperglycemia, without long-term current use of insulin (HCC)  Lab Results   Component Value Date    HGBA1C 5.5 06/16/2025       Recent Labs     07/16/25  1058 07/16/25  1636 07/16/25 2029 07/17/25  0700   POCGLU 115 178* 163* 117       Hold oral meds  Continue sliding scale insulin  Monitor blood glucose    HTN (hypertension)  Not on hypertensive meds  BP controlled  Anemia  Recent iron studies reviewed  Hemoglobin remained stable at approximately 9  No overt bleeding  Continue to monitor    VTE Pharmacologic Prophylaxis: VTE Score: 7 High Risk (Score >/= 5) - Pharmacological DVT Prophylaxis Ordered: enoxaparin (Lovenox). Sequential Compression Devices Ordered.    Mobility:   Basic Mobility Inpatient Raw Score: 19  JH-HLM Goal: 6: Walk 10 steps or more  JH-HLM Achieved: 6: Walk 10 steps or more  JH-HLM Goal achieved. Continue to encourage appropriate mobility.    Patient Centered Rounds: I performed bedside rounds with nursing staff today.   Discussions with Specialists or Other Care Team Provider: cm, nursing    Education and Discussions with Family / Patient: Patient declined call to .     Current Length of Stay: 3 day(s)  Current Patient Status: Inpatient   Certification Statement: The patient will continue to require additional inpatient hospital stay due to see below  Discharge Plan: Pending completion  of chemotherapy    Code Status: Level 1 - Full Code    Subjective   Currently without any acute  complaints.  Denies fevers, chills, cough    Objective :  Temp:  [98.3 °F (36.8 °C)-98.5 °F (36.9 °C)] 98.3 °F (36.8 °C)  HR:  [66-70] 66  BP: (128-144)/(74-82) 144/82  Resp:  [16-18] 16  SpO2:  [96 %-100 %] 98 %  O2 Device: None (Room air)    Body mass index is 25.62 kg/m².     Input and Output Summary (last 24 hours):     Intake/Output Summary (Last 24 hours) at 7/17/2025 0951  Last data filed at 7/17/2025 0900  Gross per 24 hour   Intake 240 ml   Output 3550 ml   Net -3310 ml       Physical Exam  Constitutional:       General: He is not in acute distress.     Appearance: He is well-developed. He is not diaphoretic.   HENT:      Head: Normocephalic and atraumatic.      Nose: Nose normal.      Mouth/Throat:      Pharynx: No oropharyngeal exudate.     Eyes:      General: No scleral icterus.     Conjunctiva/sclera: Conjunctivae normal.       Cardiovascular:      Rate and Rhythm: Normal rate and regular rhythm.      Heart sounds: Normal heart sounds. No murmur heard.     No friction rub. No gallop.   Pulmonary:      Effort: Pulmonary effort is normal. No respiratory distress.      Breath sounds: Normal breath sounds. No wheezing or rales.   Chest:      Chest wall: No tenderness.   Abdominal:      General: Bowel sounds are normal. There is no distension.      Palpations: Abdomen is soft.      Tenderness: There is no abdominal tenderness. There is no guarding.     Musculoskeletal:         General: No tenderness or deformity. Normal range of motion.      Cervical back: Normal range of motion and neck supple.     Skin:     General: Skin is warm and dry.      Findings: No erythema.     Neurological:      Mental Status: He is alert. Mental status is at baseline.           Lines/Drains:  Lines/Drains/Airways       Active Status       Name Placement date Placement time Site Days    PICC Line 07/14/25 Left Basilic 07/14/25  0923  Basilic  3    Urethral Catheter Coude 16 Fr. 07/14/25  1246  Coude  2                  Urinary  Catheter:  Goal for removal: N/A - Chronic Dupont         Central Line:  Goal for removal: Port accessed. Will de-access as appropriate.               Lab Results: I have reviewed the following results:   Results from last 7 days   Lab Units 07/17/25  0456 07/16/25  0628 07/15/25  0619   WBC Thousand/uL 4.41   < > 9.21   HEMOGLOBIN g/dL 8.3*   < > 9.3*   HEMATOCRIT % 25.5*   < > 27.3*   PLATELETS Thousands/uL 309   < > 392*   BANDS PCT %  --   --  2   SEGS PCT % 59   < >  --    LYMPHO PCT % 31   < > 6*   MONO PCT % 4   < > 2*   EOS PCT % 4   < > 0    < > = values in this interval not displayed.     Results from last 7 days   Lab Units 07/17/25  0456 07/16/25  0628 07/15/25  0619   SODIUM mmol/L 139   < > 138   POTASSIUM mmol/L 3.3*   < > 3.8   CHLORIDE mmol/L 96   < > 102   CO2 mmol/L 37*   < > 32   BUN mg/dL 11   < > 16   CREATININE mg/dL 0.88   < > 0.98   ANION GAP mmol/L 6   < > 4   CALCIUM mg/dL 7.9*   < > 8.9   ALBUMIN g/dL  --   --  3.7   TOTAL BILIRUBIN mg/dL  --   --  0.66   ALK PHOS U/L  --   --  62   ALT U/L  --   --  19   AST U/L  --   --  13   GLUCOSE RANDOM mg/dL 389*   < > 190*    < > = values in this interval not displayed.     Results from last 7 days   Lab Units 07/14/25  0932   INR  1.01     Results from last 7 days   Lab Units 07/17/25  0700 07/16/25  2029 07/16/25  1636 07/16/25  1058 07/16/25  0703 07/15/25  2102 07/15/25  1629 07/15/25  1115 07/15/25  0713 07/14/25  2049 07/14/25  1637 07/14/25  1211   POC GLUCOSE mg/dl 117 163* 178* 115 122 285* 293* 154* 174* 355* 159* 107               Recent Cultures (last 7 days):         Imaging Results Review: I personally reviewed the following image studies/reports in PACS and discussed pertinent findings with Radiology: chest xray. My interpretation of the radiology images/reports is:  .  Other Study Results Review: EKG was reviewed.     Last 24 Hours Medication List:     Current Facility-Administered Medications:     acetaminophen (TYLENOL) tablet 650  mg, Q6H PRN    allopurinol (ZYLOPRIM) tablet 300 mg, Daily    alteplase (CATHFLO) injection 2 mg, Q1MIN PRN    alteplase (CATHFLO) injection 2 mg, Q1MIN PRN    atorvastatin (LIPITOR) tablet 20 mg, Daily    docusate sodium (COLACE) capsule 100 mg, BID    enoxaparin (LOVENOX) subcutaneous injection 40 mg, Daily    insulin lispro (HumALOG/ADMELOG) 100 units/mL subcutaneous injection 1-5 Units, TID AC **AND** Fingerstick Glucose (POCT), TID AC    insulin lispro (HumALOG/ADMELOG) 100 units/mL subcutaneous injection 1-5 Units, HS    pantoprazole (PROTONIX) EC tablet 40 mg, Daily Before Breakfast    potassium chloride (Klor-Con M20) CR tablet 40 mEq, Once    QUEtiapine (SEROquel) tablet 12.5 mg, BID    senna (SENOKOT) tablet 17.2 mg, Daily With Lunch    sodium bicarbonate 100 mEq in dextrose 5 % 1,000 mL infusion, Continuous, Last Rate: 150 mL/hr (07/17/25 0456)    sodium chloride 0.9 % infusion, Once PRN, Last Rate: 20 mL/hr (07/15/25 1400)    Administrative Statements   Today, Patient Was Seen By: Renato Georges MD  I have spent a total time of 30 minutes in caring for this patient on the day of the visit/encounter including Diagnostic results.    **Please Note: This note may have been constructed using a voice recognition system.**

## 2025-07-17 NOTE — ASSESSMENT & PLAN NOTE
Lab Results   Component Value Date    HGBA1C 5.5 06/16/2025       Recent Labs     07/16/25  1058 07/16/25  1636 07/16/25 2029 07/17/25  0700   POCGLU 115 178* 163* 117       Hold oral meds  Continue sliding scale insulin  Monitor blood glucose

## 2025-07-18 LAB
ANION GAP SERPL CALCULATED.3IONS-SCNC: 4 MMOL/L (ref 4–13)
BASOPHILS # BLD AUTO: 0.02 THOUSANDS/ÂΜL (ref 0–0.1)
BASOPHILS NFR BLD AUTO: 1 % (ref 0–1)
BILIRUB UR QL STRIP: NEGATIVE
BUN SERPL-MCNC: 11 MG/DL (ref 5–25)
CALCIUM SERPL-MCNC: 8.8 MG/DL (ref 8.4–10.2)
CHLORIDE SERPL-SCNC: 104 MMOL/L (ref 96–108)
CLARITY UR: CLEAR
CO2 SERPL-SCNC: 34 MMOL/L (ref 21–32)
COLOR UR: COLORLESS
CREAT SERPL-MCNC: 0.95 MG/DL (ref 0.6–1.3)
EOSINOPHIL # BLD AUTO: 0.23 THOUSAND/ÂΜL (ref 0–0.61)
EOSINOPHIL NFR BLD AUTO: 5 % (ref 0–6)
ERYTHROCYTE [DISTWIDTH] IN BLOOD BY AUTOMATED COUNT: 14.5 % (ref 11.6–15.1)
GFR SERPL CREATININE-BSD FRML MDRD: 83 ML/MIN/1.73SQ M
GLUCOSE SERPL-MCNC: 124 MG/DL (ref 65–140)
GLUCOSE SERPL-MCNC: 133 MG/DL (ref 65–140)
GLUCOSE SERPL-MCNC: 202 MG/DL (ref 65–140)
GLUCOSE SERPL-MCNC: 229 MG/DL (ref 65–140)
GLUCOSE SERPL-MCNC: 236 MG/DL (ref 65–140)
GLUCOSE UR STRIP-MCNC: ABNORMAL MG/DL
GLUCOSE UR STRIP-MCNC: NEGATIVE MG/DL
HCT VFR BLD AUTO: 27.8 % (ref 36.5–49.3)
HGB BLD-MCNC: 9.3 G/DL (ref 12–17)
HGB UR QL STRIP.AUTO: NEGATIVE
IMM GRANULOCYTES # BLD AUTO: 0.01 THOUSAND/UL (ref 0–0.2)
IMM GRANULOCYTES NFR BLD AUTO: 0 % (ref 0–2)
KETONES UR STRIP-MCNC: NEGATIVE MG/DL
LEUKOCYTE ESTERASE UR QL STRIP: NEGATIVE
LYMPHOCYTES # BLD AUTO: 1.44 THOUSANDS/ÂΜL (ref 0.6–4.47)
LYMPHOCYTES NFR BLD AUTO: 33 % (ref 14–44)
MCH RBC QN AUTO: 32.3 PG (ref 26.8–34.3)
MCHC RBC AUTO-ENTMCNC: 33.5 G/DL (ref 31.4–37.4)
MCV RBC AUTO: 97 FL (ref 82–98)
MONOCYTES # BLD AUTO: 0.09 THOUSAND/ÂΜL (ref 0.17–1.22)
MONOCYTES NFR BLD AUTO: 2 % (ref 4–12)
MTX SERPL-SCNC: 0.15 UMOL/L
MTX SERPL-SCNC: 0.46 UMOL/L
NEUTROPHILS # BLD AUTO: 2.52 THOUSANDS/ÂΜL (ref 1.85–7.62)
NEUTS SEG NFR BLD AUTO: 59 % (ref 43–75)
NITRITE UR QL STRIP: NEGATIVE
NRBC BLD AUTO-RTO: 0 /100 WBCS
PH UR STRIP.AUTO: 8 [PH]
PH UR STRIP.AUTO: 8.5 [PH]
PLATELET # BLD AUTO: 368 THOUSANDS/UL (ref 149–390)
PMV BLD AUTO: 10.4 FL (ref 8.9–12.7)
POTASSIUM SERPL-SCNC: 3.8 MMOL/L (ref 3.5–5.3)
PROT UR STRIP-MCNC: NEGATIVE MG/DL
RBC # BLD AUTO: 2.88 MILLION/UL (ref 3.88–5.62)
SODIUM SERPL-SCNC: 142 MMOL/L (ref 135–147)
SP GR UR STRIP.AUTO: 1 (ref 1–1.03)
SP GR UR STRIP.AUTO: 1.01 (ref 1–1.03)
UROBILINOGEN UR STRIP-ACNC: <2 MG/DL
WBC # BLD AUTO: 4.31 THOUSAND/UL (ref 4.31–10.16)

## 2025-07-18 PROCEDURE — 99232 SBSQ HOSP IP/OBS MODERATE 35: CPT | Performed by: INTERNAL MEDICINE

## 2025-07-18 PROCEDURE — 80204 DRUG ASSAY METHOTREXATE: CPT | Performed by: INTERNAL MEDICINE

## 2025-07-18 PROCEDURE — 81003 URINALYSIS AUTO W/O SCOPE: CPT | Performed by: INTERNAL MEDICINE

## 2025-07-18 PROCEDURE — 82948 REAGENT STRIP/BLOOD GLUCOSE: CPT

## 2025-07-18 PROCEDURE — 80048 BASIC METABOLIC PNL TOTAL CA: CPT | Performed by: INTERNAL MEDICINE

## 2025-07-18 PROCEDURE — 85025 COMPLETE CBC W/AUTO DIFF WBC: CPT | Performed by: INTERNAL MEDICINE

## 2025-07-18 RX ORDER — LEUCOVORIN CALCIUM 25 MG/1
25 TABLET ORAL EVERY 6 HOURS
Status: DISCONTINUED | OUTPATIENT
Start: 2025-07-18 | End: 2025-07-19 | Stop reason: HOSPADM

## 2025-07-18 RX ADMIN — QUETIAPINE 12.5 MG: 25 TABLET ORAL at 21:14

## 2025-07-18 RX ADMIN — PANTOPRAZOLE SODIUM 40 MG: 40 TABLET, DELAYED RELEASE ORAL at 07:40

## 2025-07-18 RX ADMIN — INSULIN LISPRO 2 UNITS: 100 INJECTION, SOLUTION INTRAVENOUS; SUBCUTANEOUS at 12:18

## 2025-07-18 RX ADMIN — DOCUSATE SODIUM 100 MG: 100 CAPSULE, LIQUID FILLED ORAL at 21:14

## 2025-07-18 RX ADMIN — SODIUM BICARBONATE 150 ML/HR: 84 INJECTION, SOLUTION INTRAVENOUS at 21:36

## 2025-07-18 RX ADMIN — DOCUSATE SODIUM 100 MG: 100 CAPSULE, LIQUID FILLED ORAL at 08:53

## 2025-07-18 RX ADMIN — LEUCOVORIN CALCIUM 25 MG: 25 TABLET ORAL at 05:32

## 2025-07-18 RX ADMIN — LEUCOVORIN CALCIUM 25 MG: 25 TABLET ORAL at 18:11

## 2025-07-18 RX ADMIN — SODIUM BICARBONATE 150 ML/HR: 84 INJECTION, SOLUTION INTRAVENOUS at 13:13

## 2025-07-18 RX ADMIN — SENNOSIDES 17.2 MG: 8.6 TABLET, FILM COATED ORAL at 12:18

## 2025-07-18 RX ADMIN — LEUCOVORIN CALCIUM 25 MG: 25 TABLET ORAL at 13:17

## 2025-07-18 RX ADMIN — LEUCOVORIN CALCIUM 25 MG: 25 TABLET ORAL at 23:27

## 2025-07-18 RX ADMIN — SODIUM BICARBONATE 150 ML/HR: 84 INJECTION, SOLUTION INTRAVENOUS at 03:51

## 2025-07-18 RX ADMIN — INSULIN LISPRO 1 UNITS: 100 INJECTION, SOLUTION INTRAVENOUS; SUBCUTANEOUS at 18:11

## 2025-07-18 RX ADMIN — ALLOPURINOL 300 MG: 300 TABLET ORAL at 08:53

## 2025-07-18 RX ADMIN — INSULIN LISPRO 2 UNITS: 100 INJECTION, SOLUTION INTRAVENOUS; SUBCUTANEOUS at 21:14

## 2025-07-18 RX ADMIN — ATORVASTATIN CALCIUM 20 MG: 20 TABLET, FILM COATED ORAL at 08:53

## 2025-07-18 RX ADMIN — ENOXAPARIN SODIUM 40 MG: 40 INJECTION SUBCUTANEOUS at 08:53

## 2025-07-18 RX ADMIN — QUETIAPINE 12.5 MG: 25 TABLET ORAL at 08:53

## 2025-07-18 NOTE — PROGRESS NOTES
Progress Note - Hospitalist   Name: Alexis Dennison 64 y.o. 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  Has been receiving inpatient high-dose methotrexate and rituximab and outpatient temozolomide 90 mg a day on day 7-11  Directly admitted for initiation of cycle 6  Medical oncology consulted  Follow-up further oncology recs  Type 2 diabetes mellitus with hyperglycemia, without long-term current use of insulin (HCC)  Lab Results   Component Value Date    HGBA1C 5.5 06/16/2025       Recent Labs     07/17/25  1042 07/17/25  1601 07/17/25  2036 07/18/25  0710   POCGLU 108 175* 202* 133       Hold oral meds  Continue sliding scale insulin  Monitor blood glucose    HTN (hypertension)  Not on hypertensive meds  BP controlled  Anemia  Recent iron studies reviewed  Hemoglobin remained stable at approximately 9  No overt bleeding  Continue to monitor    VTE Pharmacologic Prophylaxis: VTE Score: 7 High Risk (Score >/= 5) - Pharmacological DVT Prophylaxis Ordered: enoxaparin (Lovenox). Sequential Compression Devices Ordered.    Mobility:   Basic Mobility Inpatient Raw Score: 19  JH-HLM Goal: 6: Walk 10 steps or more  JH-HLM Achieved: 6: Walk 10 steps or more  JH-HLM Goal achieved. Continue to encourage appropriate mobility.    Patient Centered Rounds: I performed bedside rounds with nursing staff today.   Discussions with Specialists or Other Care Team Provider: cm    Education and Discussions with Family / Patient: Patient declined call to .     Current Length of Stay: 4 day(s)  Current Patient Status: Inpatient   Certification Statement: The patient will continue to require additional inpatient hospital stay due to see below  Discharge Plan: Pending completion of chemo    Code Status: Level 1 - Full Code    Subjective   Currently with complaints.  Denies fevers,  chills, cough    Objective :  Temp:  [98.4 °F (36.9 °C)-98.9 °F (37.2 °C)] 98.4 °F (36.9 °C)  HR:  [69-72] 69  BP: (140-147)/(79-81) 140/81  Resp:  [16] 16  SpO2:  [97 %-98 %] 98 %  O2 Device: None (Room air)    Body mass index is 25.62 kg/m².     Input and Output Summary (last 24 hours):     Intake/Output Summary (Last 24 hours) at 7/18/2025 1014  Last data filed at 7/18/2025 0709  Gross per 24 hour   Intake 1640 ml   Output 6350 ml   Net -4710 ml       Physical Exam  Constitutional:       General: He is not in acute distress.     Appearance: He is well-developed. He is not diaphoretic.   HENT:      Head: Normocephalic and atraumatic.      Nose: Nose normal.      Mouth/Throat:      Pharynx: No oropharyngeal exudate.     Eyes:      General: No scleral icterus.     Conjunctiva/sclera: Conjunctivae normal.       Cardiovascular:      Rate and Rhythm: Normal rate and regular rhythm.      Heart sounds: Normal heart sounds. No murmur heard.     No friction rub. No gallop.   Pulmonary:      Effort: Pulmonary effort is normal. No respiratory distress.      Breath sounds: Normal breath sounds. No wheezing or rales.   Chest:      Chest wall: No tenderness.   Abdominal:      General: Bowel sounds are normal. There is no distension.      Palpations: Abdomen is soft.      Tenderness: There is no abdominal tenderness. There is no guarding.     Musculoskeletal:         General: No tenderness or deformity. Normal range of motion.      Cervical back: Normal range of motion and neck supple.     Skin:     General: Skin is warm and dry.      Findings: No erythema.     Neurological:      Mental Status: He is alert. Mental status is at baseline.           Lines/Drains:  Lines/Drains/Airways       Active Status       Name Placement date Placement time Site Days    PICC Line 07/14/25 Left Basilic 07/14/25  0923  Basilic  4    Urethral Catheter Coude 16 Fr. 07/14/25  1246  Coude  3                  Urinary Catheter:  Goal for removal:  Remove after 48 hrs of I/O monitoring         Central Line:  Goal for removal: Port accessed. Will de-access as appropriate.               Lab Results: I have reviewed the following results:   Results from last 7 days   Lab Units 07/18/25  0533 07/16/25  0628 07/15/25  0619   WBC Thousand/uL 4.31   < > 9.21   HEMOGLOBIN g/dL 9.3*   < > 9.3*   HEMATOCRIT % 27.8*   < > 27.3*   PLATELETS Thousands/uL 368   < > 392*   BANDS PCT %  --   --  2   SEGS PCT % 59   < >  --    LYMPHO PCT % 33   < > 6*   MONO PCT % 2*   < > 2*   EOS PCT % 5   < > 0    < > = values in this interval not displayed.     Results from last 7 days   Lab Units 07/18/25  0533 07/16/25  0628 07/15/25  0619   SODIUM mmol/L 142   < > 138   POTASSIUM mmol/L 3.8   < > 3.8   CHLORIDE mmol/L 104   < > 102   CO2 mmol/L 34*   < > 32   BUN mg/dL 11   < > 16   CREATININE mg/dL 0.95   < > 0.98   ANION GAP mmol/L 4   < > 4   CALCIUM mg/dL 8.8   < > 8.9   ALBUMIN g/dL  --   --  3.7   TOTAL BILIRUBIN mg/dL  --   --  0.66   ALK PHOS U/L  --   --  62   ALT U/L  --   --  19   AST U/L  --   --  13   GLUCOSE RANDOM mg/dL 124   < > 190*    < > = values in this interval not displayed.     Results from last 7 days   Lab Units 07/14/25  0932   INR  1.01     Results from last 7 days   Lab Units 07/18/25  0710 07/17/25  2036 07/17/25  1601 07/17/25  1042 07/17/25  0700 07/16/25  2029 07/16/25  1636 07/16/25  1058 07/16/25  0703 07/15/25  2102 07/15/25  1629 07/15/25  1115   POC GLUCOSE mg/dl 133 202* 175* 108 117 163* 178* 115 122 285* 293* 154*               Recent Cultures (last 7 days):         Imaging Results Review: I personally reviewed the following image studies/reports in PACS and discussed pertinent findings with Radiology: chest xray. My interpretation of the radiology images/reports is:  .  Other Study Results Review: EKG was reviewed.     Last 24 Hours Medication List:     Current Facility-Administered Medications:     acetaminophen (TYLENOL) tablet 650 mg, Q6H  PRN    allopurinol (ZYLOPRIM) tablet 300 mg, Daily    alteplase (CATHFLO) injection 2 mg, Q1MIN PRN    alteplase (CATHFLO) injection 2 mg, Q1MIN PRN    atorvastatin (LIPITOR) tablet 20 mg, Daily    docusate sodium (COLACE) capsule 100 mg, BID    enoxaparin (LOVENOX) subcutaneous injection 40 mg, Daily    insulin lispro (HumALOG/ADMELOG) 100 units/mL subcutaneous injection 1-5 Units, TID AC **AND** Fingerstick Glucose (POCT), TID AC    insulin lispro (HumALOG/ADMELOG) 100 units/mL subcutaneous injection 1-5 Units, HS    leucovorin (WELLCOVORIN) tablet 25 mg, Q6H    pantoprazole (PROTONIX) EC tablet 40 mg, Daily Before Breakfast    QUEtiapine (SEROquel) tablet 12.5 mg, BID    senna (SENOKOT) tablet 17.2 mg, Daily With Lunch    sodium bicarbonate 100 mEq in dextrose 5 % 1,000 mL infusion, Continuous, Last Rate: 150 mL/hr (07/18/25 0351)    sodium chloride 0.9 % infusion, Once PRN, Last Rate: 20 mL/hr (07/15/25 1400)    Administrative Statements   Today, Patient Was Seen By: Renato Georges MD  I have spent a total time of 30 minutes in caring for this patient on the day of the visit/encounter including Diagnostic results.    **Please Note: This note may have been constructed using a voice recognition system.**

## 2025-07-18 NOTE — PLAN OF CARE
Problem: Knowledge Deficit  Goal: Patient/family/caregiver demonstrates understanding of disease process, treatment plan, medications, and discharge instructions  Description: Complete learning assessment and assess knowledge base.  Interventions:  - Provide teaching at level of understanding  - Provide teaching via preferred learning methods  Outcome: Progressing     Problem: Prexisting or High Potential for Compromised Skin Integrity  Goal: Skin integrity is maintained or improved  Description: INTERVENTIONS:  - Identify patients at risk for skin breakdown  - Assess and monitor skin integrity including under and around medical devices   - Assess and monitor nutrition and hydration status  - Monitor labs  - Assess for incontinence   - Turn and reposition patient  - Assist with mobility/ambulation  - Relieve pressure over antonieta prominences   - Avoid friction and shearing  - Provide appropriate hygiene as needed including keeping skin clean and dry  - Evaluate need for skin moisturizer/barrier cream  - Collaborate with interdisciplinary team  - Patient/family teaching  - Consider wound care consult    Assess:  - Review [place] scale daily  - Clean and moisturize skin every   - Inspect skin when repositioning, toileting, and assisting with ADLS  - Assess under medical devices such as  every   - Assess extremities for adequate circulation and sensation     Bed Management:  - Have minimal linens on bed & keep smooth, unwrinkled  - Change linens as needed when moist or perspiring  - Avoid sitting or lying in one position for more than  hours while in bed?Keep HOB at degrees   - Toileting:  - Offer bedside commode  - Assess for incontinence every   - Use incontinent care products after each incontinent episode such as     Activity:  - Mobilize patient  times a day  - Encourage activity and walks on unit  - Encourage or provide ROM exercises   - Turn and reposition patient every  Hours  - Use appropriate equipment to lift  or move patient in bed  - Instruct/ Assist with weight shifting every  when out of bed in chair  - Consider limitation of chair time  hour intervals    Skin Care:  - Avoid use of baby powder, tape, friction and shearing, hot water or constrictive clothing  - Relieve pressure over bony prominences using   - Do not massage red bony areas    Next Steps:  - Teach patient strategies to minimize risks such as   - Consider consults to  interdisciplinary teams such as   Outcome: Progressing     Problem: PAIN - ADULT  Goal: Verbalizes/displays adequate comfort level or baseline comfort level  Description: Interventions:  - Encourage patient to monitor pain and request assistance  - Assess pain using appropriate pain scale  - Administer analgesics as ordered based on type and severity of pain and evaluate response  - Implement non-pharmacological measures as appropriate and evaluate response  - Consider cultural and social influences on pain and pain management  - Notify physician/advanced practitioner if interventions unsuccessful or patient reports new pain  - Educate patient/family on pain management process including their role and importance of  reporting pain   - Provide non-pharmacologic/complimentary pain relief interventions  Outcome: Progressing     Problem: INFECTION - ADULT  Goal: Absence or prevention of progression during hospitalization  Description: INTERVENTIONS:  - Assess and monitor for signs and symptoms of infection  - Monitor lab/diagnostic results  - Monitor all insertion sites, i.e. indwelling lines, tubes, and drains  - Monitor endotracheal if appropriate and nasal secretions for changes in amount and color  - [place] appropriate cooling/warming therapies per order  - Administer medications as ordered  - Instruct and encourage patient and family to use good hand hygiene technique  - Identify and instruct in appropriate isolation precautions for identified infection/condition  Outcome:  Progressing  Goal: Absence of fever/infection during neutropenic period  Description: INTERVENTIONS:  - Monitor WBC  - Perform strict hand hygiene  - Limit to healthy visitors only  - No plants, dried, fresh or silk flowers with carrion in patient room  Outcome: Progressing     Problem: SAFETY ADULT  Goal: Patient will remain free of falls  Description: INTERVENTIONS:  - Educate patient/family on patient safety including physical limitations  - Instruct patient to call for assistance with activity   - Consider consulting OT/PT to assist with strengthening/mobility based on AM PAC & -HLM score  - Consult OT/PT to assist with strengthening/mobility   - Keep Call bell within reach  - Keep bed low and locked with side rails adjusted as appropriate  - Keep care items and personal belongings within reach  - Initiate and maintain comfort rounds  - Make Fall Risk Sign visible to staff  - Offer Toileting every Hours, in advance of need  - Initiate/Maintain bed/ chair alarm  - Obtain necessary fall risk management equipment:   - Apply yellow socks and bracelet for high fall risk patients  - Consider moving patient to room near nurses station  Outcome: Progressing  Goal: Maintain or return to baseline ADL function  Description: INTERVENTIONS:  -  Assess patient's ability to carry out ADLs; assess patient's baseline for ADL function and identify physical deficits which impact ability to perform ADLs (bathing, care of mouth/teeth, toileting, grooming, dressing, etc.)  - Assess/evaluate cause of self-care deficits   - Assess range of motion  - Assess patient's mobility; develop plan if impaired  - Assess patient's need for assistive devices and provide as appropriate  - Encourage maximum independence but intervene and supervise when necessary  - Involve family in performance of ADLs  - Assess for home care needs following discharge   - Consider OT consult to assist with ADL evaluation and planning for discharge  - Provide patient  education as appropriate  - Monitor functional capacity and physical performance, use of AM PAC & JH-HLM   - Monitor gait, balance and fatigue with ambulation    Outcome: Progressing  Goal: Maintains/Returns to pre admission functional level  Description: INTERVENTIONS:  - Perform AM-PAC 6 Click Basic Mobility/ Daily Activity assessment daily.  - Set and communicate daily mobility goal to care team and patient/family/caregiver.   - Collaborate with rehabilitation services on mobility goals if consulted  - Perform Range of Motion  times a day.  - Reposition patient every  hours.  - Dangle patient  times a day  - Stand patient  times a day  - Ambulate patient  times a day  - Out of bed to chair  times a day   - Out of bed for meals  times a day  - Out of bed for toileting  - Record patient progress and toleration of activity level   Outcome: Progressing     Problem: DISCHARGE PLANNING  Goal: Discharge to home or other facility with appropriate resources  Description: INTERVENTIONS:  - Identify barriers to discharge w/patient and caregiver  - Arrange for needed discharge resources and transportation as appropriate  - Identify discharge learning needs (meds, wound care, etc.)  - Arrange for interpretive services to assist at discharge as needed  - Refer to Case Management Department for coordinating discharge planning if the patient needs post-hospital services based on physician/advanced practitioner order or complex needs related to functional status, cognitive ability, or social support system  Outcome: Progressing

## 2025-07-18 NOTE — PLAN OF CARE
Problem: Knowledge Deficit  Goal: Patient/family/caregiver demonstrates understanding of disease process, treatment plan, medications, and discharge instructions  Description: Complete learning assessment and assess knowledge base.  Interventions:  - Provide teaching at level of understanding  - Provide teaching via preferred learning methods  7/18/2025 0025 by Tonya Galeana RN  Outcome: Progressing  7/18/2025 0025 by Tonya Galeana RN  Outcome: Progressing     Problem: Prexisting or High Potential for Compromised Skin Integrity  Goal: Skin integrity is maintained or improved  Description: INTERVENTIONS:  - Identify patients at risk for skin breakdown  - Assess and monitor skin integrity including under and around medical devices   - Assess and monitor nutrition and hydration status  - Monitor labs  - Assess for incontinence   - Turn and reposition patient  - Assist with mobility/ambulation  - Relieve pressure over antonieta prominences   - Avoid friction and shearing  - Provide appropriate hygiene as needed including keeping skin clean and dry  - Evaluate need for skin moisturizer/barrier cream  - Collaborate with interdisciplinary team  - Patient/family teaching  - Consider wound care consult    Assess:  - Review [place] scale daily  - Clean and moisturize skin every shift  - Inspect skin when repositioning, toileting, and assisting with ADLS  - Assess extremities for adequate circulation and sensation     Bed Management:  - Have minimal linens on bed & keep smooth, unwrinkled  - Change linens as needed when moist or perspiring  - Avoid sitting or lying in one position for more than 2 hours while in bed?Keep HOB at 30degrees   - Toileting:  - Offer bedside commode  - Assess for incontinence every 2 hours  - Use incontinent care products after each incontinent episode    Activity:  - Mobilize patient 3 times a day  - Encourage activity and walks on unit  - Encourage or provide ROM exercises   - Turn and  reposition patient every 2 Hours  - Use appropriate equipment to lift or move patient in bed  - Instruct/ Assist with weight shifting every 20 min when out of bed in chair  - Consider limitation of chair time 1 hour intervals    Skin Care:  - Avoid use of baby powder, tape, friction and shearing, hot water or constrictive clothing  - Relieve pressure over bony prominences using mepilex  - Do not massage red bony areas    Next Steps:  - Teach patient strategies to minimize risks such as turning and deep breathing and coughing  - Consider consults to  interdisciplinary teams such as PT and OT  7/18/2025 0025 by Tonya Galeana RN  Outcome: Progressing  7/18/2025 0025 by Tonya Galeana RN  Outcome: Progressing     Problem: PAIN - ADULT  Goal: Verbalizes/displays adequate comfort level or baseline comfort level  Description: Interventions:  - Encourage patient to monitor pain and request assistance  - Assess pain using appropriate pain scale  - Administer analgesics as ordered based on type and severity of pain and evaluate response  - Implement non-pharmacological measures as appropriate and evaluate response  - Consider cultural and social influences on pain and pain management  - Notify physician/advanced practitioner if interventions unsuccessful or patient reports new pain  - Educate patient/family on pain management process including their role and importance of  reporting pain   - Provide non-pharmacologic/complimentary pain relief interventions  Outcome: Progressing     Problem: INFECTION - ADULT  Goal: Absence or prevention of progression during hospitalization  Description: INTERVENTIONS:  - Assess and monitor for signs and symptoms of infection  - Monitor lab/diagnostic results  - Monitor all insertion sites, i.e. indwelling lines, tubes, and drains  - Monitor endotracheal if appropriate and nasal secretions for changes in amount and color  - [place] appropriate cooling/warming therapies per order  -  Administer medications as ordered  - Instruct and encourage patient and family to use good hand hygiene technique  - Identify and instruct in appropriate isolation precautions for identified infection/condition  Outcome: Progressing  Goal: Absence of fever/infection during neutropenic period  Description: INTERVENTIONS:  - Monitor WBC  - Perform strict hand hygiene  - Limit to healthy visitors only  - No plants, dried, fresh or silk flowers with carrion in patient room  Outcome: Progressing     Problem: SAFETY ADULT  Goal: Patient will remain free of falls  Description: INTERVENTIONS:  - Educate patient/family on patient safety including physical limitations  - Instruct patient to call for assistance with activity   - Consider consulting OT/PT to assist with strengthening/mobility based on AM PAC & -HLM score  - Consult OT/PT to assist with strengthening/mobility   - Keep Call bell within reach  - Keep bed low and locked with side rails adjusted as appropriate  - Keep care items and personal belongings within reach  - Initiate and maintain comfort rounds  - Make Fall Risk Sign visible to staff  - Offer Toileting every 2 Hours, in advance of need  - Initiate/Maintain bed alarm  - Obtain necessary fall risk management equipment  - Apply yellow socks and bracelet for high fall risk patients  - Consider moving patient to room near nurses station  Outcome: Progressing  Goal: Maintain or return to baseline ADL function  Description: INTERVENTIONS:  -  Assess patient's ability to carry out ADLs; assess patient's baseline for ADL function and identify physical deficits which impact ability to perform ADLs (bathing, care of mouth/teeth, toileting, grooming, dressing, etc.)  - Assess/evaluate cause of self-care deficits   - Assess range of motion  - Assess patient's mobility; develop plan if impaired  - Assess patient's need for assistive devices and provide as appropriate  - Encourage maximum independence but intervene and  supervise when necessary  - Involve family in performance of ADLs  - Assess for home care needs following discharge   - Consider OT consult to assist with ADL evaluation and planning for discharge  - Provide patient education as appropriate  - Monitor functional capacity and physical performance, use of AM PAC & JH-HLM   - Monitor gait, balance and fatigue with ambulation    Outcome: Progressing  Goal: Maintains/Returns to pre admission functional level  Description: INTERVENTIONS:  - Perform AM-PAC 6 Click Basic Mobility/ Daily Activity assessment daily.  - Set and communicate daily mobility goal to care team and patient/family/caregiver.   - Collaborate with rehabilitation services on mobility goals if consulted  - Dangle patient 3 times a day  - Stand patient 3 times a day  - Ambulate patient 3 times a day  - Out of bed to chair 3 times a day   - Out of bed for meals 3 times a day  - Out of bed for toileting  - Record patient progress and toleration of activity level   Outcome: Progressing     Problem: DISCHARGE PLANNING  Goal: Discharge to home or other facility with appropriate resources  Description: INTERVENTIONS:  - Identify barriers to discharge w/patient and caregiver  - Arrange for needed discharge resources and transportation as appropriate  - Identify discharge learning needs (meds, wound care, etc.)  - Arrange for interpretive services to assist at discharge as needed  - Refer to Case Management Department for coordinating discharge planning if the patient needs post-hospital services based on physician/advanced practitioner order or complex needs related to functional status, cognitive ability, or social support system  Outcome: Progressing

## 2025-07-18 NOTE — ASSESSMENT & PLAN NOTE
Lab Results   Component Value Date    HGBA1C 5.5 06/16/2025       Recent Labs     07/17/25  1042 07/17/25  1601 07/17/25 2036 07/18/25  0710   POCGLU 108 175* 202* 133       Hold oral meds  Continue sliding scale insulin  Monitor blood glucose

## 2025-07-19 VITALS
SYSTOLIC BLOOD PRESSURE: 142 MMHG | BODY MASS INDEX: 25.56 KG/M2 | RESPIRATION RATE: 17 BRPM | DIASTOLIC BLOOD PRESSURE: 81 MMHG | HEART RATE: 70 BPM | TEMPERATURE: 98.5 F | HEIGHT: 70 IN | OXYGEN SATURATION: 97 % | WEIGHT: 178.57 LBS

## 2025-07-19 LAB
ANION GAP SERPL CALCULATED.3IONS-SCNC: 6 MMOL/L (ref 4–13)
BASOPHILS # BLD AUTO: 0.04 THOUSANDS/ÂΜL (ref 0–0.1)
BASOPHILS NFR BLD AUTO: 1 % (ref 0–1)
BILIRUB UR QL STRIP: NEGATIVE
BILIRUB UR QL STRIP: NEGATIVE
BUN SERPL-MCNC: 10 MG/DL (ref 5–25)
CALCIUM SERPL-MCNC: 8.5 MG/DL (ref 8.4–10.2)
CHLORIDE SERPL-SCNC: 103 MMOL/L (ref 96–108)
CLARITY UR: CLEAR
CLARITY UR: CLEAR
CO2 SERPL-SCNC: 32 MMOL/L (ref 21–32)
COLOR UR: COLORLESS
COLOR UR: COLORLESS
CREAT SERPL-MCNC: 0.92 MG/DL (ref 0.6–1.3)
EOSINOPHIL # BLD AUTO: 0.25 THOUSAND/ÂΜL (ref 0–0.61)
EOSINOPHIL NFR BLD AUTO: 6 % (ref 0–6)
ERYTHROCYTE [DISTWIDTH] IN BLOOD BY AUTOMATED COUNT: 14.4 % (ref 11.6–15.1)
GFR SERPL CREATININE-BSD FRML MDRD: 86 ML/MIN/1.73SQ M
GLUCOSE SERPL-MCNC: 125 MG/DL (ref 65–140)
GLUCOSE SERPL-MCNC: 143 MG/DL (ref 65–140)
GLUCOSE SERPL-MCNC: 258 MG/DL (ref 65–140)
GLUCOSE UR STRIP-MCNC: ABNORMAL MG/DL
GLUCOSE UR STRIP-MCNC: NEGATIVE MG/DL
HCT VFR BLD AUTO: 28.3 % (ref 36.5–49.3)
HGB BLD-MCNC: 9.3 G/DL (ref 12–17)
HGB UR QL STRIP.AUTO: NEGATIVE
HGB UR QL STRIP.AUTO: NEGATIVE
IMM GRANULOCYTES # BLD AUTO: 0.02 THOUSAND/UL (ref 0–0.2)
IMM GRANULOCYTES NFR BLD AUTO: 1 % (ref 0–2)
KETONES UR STRIP-MCNC: NEGATIVE MG/DL
KETONES UR STRIP-MCNC: NEGATIVE MG/DL
LEUKOCYTE ESTERASE UR QL STRIP: NEGATIVE
LEUKOCYTE ESTERASE UR QL STRIP: NEGATIVE
LYMPHOCYTES # BLD AUTO: 1.66 THOUSANDS/ÂΜL (ref 0.6–4.47)
LYMPHOCYTES NFR BLD AUTO: 38 % (ref 14–44)
MCH RBC QN AUTO: 31.5 PG (ref 26.8–34.3)
MCHC RBC AUTO-ENTMCNC: 32.9 G/DL (ref 31.4–37.4)
MCV RBC AUTO: 96 FL (ref 82–98)
MONOCYTES # BLD AUTO: 0.13 THOUSAND/ÂΜL (ref 0.17–1.22)
MONOCYTES NFR BLD AUTO: 3 % (ref 4–12)
MTX SERPL-SCNC: <0.1 UMOL/L
NEUTROPHILS # BLD AUTO: 2.26 THOUSANDS/ÂΜL (ref 1.85–7.62)
NEUTS SEG NFR BLD AUTO: 51 % (ref 43–75)
NITRITE UR QL STRIP: NEGATIVE
NITRITE UR QL STRIP: NEGATIVE
NRBC BLD AUTO-RTO: 0 /100 WBCS
PH UR STRIP.AUTO: 8 [PH]
PH UR STRIP.AUTO: 8 [PH]
PLATELET # BLD AUTO: 364 THOUSANDS/UL (ref 149–390)
PMV BLD AUTO: 10.2 FL (ref 8.9–12.7)
POTASSIUM SERPL-SCNC: 3.6 MMOL/L (ref 3.5–5.3)
PROT UR STRIP-MCNC: NEGATIVE MG/DL
PROT UR STRIP-MCNC: NEGATIVE MG/DL
RBC # BLD AUTO: 2.95 MILLION/UL (ref 3.88–5.62)
SODIUM SERPL-SCNC: 141 MMOL/L (ref 135–147)
SP GR UR STRIP.AUTO: 1 (ref 1–1.03)
SP GR UR STRIP.AUTO: 1.01 (ref 1–1.03)
UROBILINOGEN UR STRIP-ACNC: <2 MG/DL
UROBILINOGEN UR STRIP-ACNC: <2 MG/DL
WBC # BLD AUTO: 4.36 THOUSAND/UL (ref 4.31–10.16)

## 2025-07-19 PROCEDURE — 81003 URINALYSIS AUTO W/O SCOPE: CPT | Performed by: INTERNAL MEDICINE

## 2025-07-19 PROCEDURE — 99239 HOSP IP/OBS DSCHRG MGMT >30: CPT | Performed by: INTERNAL MEDICINE

## 2025-07-19 PROCEDURE — 85025 COMPLETE CBC W/AUTO DIFF WBC: CPT | Performed by: INTERNAL MEDICINE

## 2025-07-19 PROCEDURE — 82948 REAGENT STRIP/BLOOD GLUCOSE: CPT

## 2025-07-19 PROCEDURE — 80048 BASIC METABOLIC PNL TOTAL CA: CPT | Performed by: INTERNAL MEDICINE

## 2025-07-19 RX ORDER — LEUCOVORIN CALCIUM 25 MG/1
25 TABLET ORAL EVERY 6 HOURS
Qty: 4 TABLET | Refills: 0 | Status: SHIPPED | OUTPATIENT
Start: 2025-07-19 | End: 2025-07-20

## 2025-07-19 RX ADMIN — PANTOPRAZOLE SODIUM 40 MG: 40 TABLET, DELAYED RELEASE ORAL at 04:58

## 2025-07-19 RX ADMIN — QUETIAPINE 12.5 MG: 25 TABLET ORAL at 07:51

## 2025-07-19 RX ADMIN — LEUCOVORIN CALCIUM 25 MG: 25 TABLET ORAL at 11:36

## 2025-07-19 RX ADMIN — SENNOSIDES 17.2 MG: 8.6 TABLET, FILM COATED ORAL at 11:36

## 2025-07-19 RX ADMIN — LEUCOVORIN CALCIUM 25 MG: 25 TABLET ORAL at 04:59

## 2025-07-19 RX ADMIN — INSULIN LISPRO 2 UNITS: 100 INJECTION, SOLUTION INTRAVENOUS; SUBCUTANEOUS at 11:36

## 2025-07-19 RX ADMIN — ALLOPURINOL 300 MG: 300 TABLET ORAL at 07:51

## 2025-07-19 RX ADMIN — ATORVASTATIN CALCIUM 20 MG: 20 TABLET, FILM COATED ORAL at 07:51

## 2025-07-19 RX ADMIN — ENOXAPARIN SODIUM 40 MG: 40 INJECTION SUBCUTANEOUS at 07:51

## 2025-07-19 RX ADMIN — DOCUSATE SODIUM 100 MG: 100 CAPSULE, LIQUID FILLED ORAL at 07:51

## 2025-07-19 NOTE — DISCHARGE SUMMARY
Discharge Summary - Hospitalist   Name: Alexis Dennison 64 y.o. 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  Has been receiving inpatient high-dose methotrexate and rituximab and outpatient temozolomide 90 mg a day on day 7-11  Directly admitted for initiation of cycle 6  Completed patient chemotherapy  Will follow-up outpatient with oncology  Type 2 diabetes mellitus with hyperglycemia, without long-term current use of insulin ([facility])  Lab Results   Component Value Date    HGBA1C 5.5 06/16/2025       Recent Labs     07/18/25  1101 07/18/25  1539 07/18/25  2054 07/19/25  0727   POCGLU 229* 202* 236* 143*       Resume home meds    HTN (hypertension)  Not on hypertensive meds  BP controlled  Anemia  Recent iron studies reviewed  Hemoglobin remained stable at approximately 9  No overt bleeding  Continue to monitor    Discharging Physician / Practitioner: Renato Georges MD  PCP: PATI Mckeon  Admission Date:   Admission Orders (From admission, onward)       Ordered        07/14/25 0828  INPATIENT ADMISSION  Once                          Discharge Date: 07/19/25    Medical Problems       Resolved Problems  Date Reviewed: 7/19/2025   None         Consultations During Hospital Stay:  oncology    Procedures Performed:   none    Significant Findings / Test Results:   No results found.     Incidental Findings:   none     Test Results Pending at Discharge (will require follow up):   none     Outpatient Tests Requested:  none    Complications:  none    Reason for Admission: CNS lymphoma     Hospital Course:     Alexis Dennison is a 64 y.o. male patient who originally presented to the hospital on 7/14/2025 with past medical history significant hypertension, type 2 diabetes initially presented with CNS lymphoma for chemotherapy.  Pleated cycle of chemotherapy while inpatient.  Will  "follow-up outpatient with oncology and primary care doctor      Please see above list of diagnoses and related plan for additional information.     Condition at Discharge: stable     Discharge Day Visit / Exam:     Subjective:   Denies chest pain, shortness of breath, cough, fevers, chills  Vitals: Blood Pressure: 142/81 (07/19/25 0727)  Pulse: 70 (07/19/25 0727)  Temperature: 98.5 °F (36.9 °C) (07/19/25 0727)  Temp Source: Oral (07/18/25 1437)  Respirations: 17 (07/19/25 0727)  Height: 5' 10\" (177.8 cm) (07/14/25 0930)  Weight - Scale: 81 kg (178 lb 9.2 oz) (07/14/25 0930)  SpO2: 97 % (07/19/25 0727)  Exam:   Physical Exam  Constitutional:       General: He is not in acute distress.     Appearance: He is well-developed. He is not diaphoretic.   HENT:      Head: Normocephalic and atraumatic.      Nose: Nose normal.      Mouth/Throat:      Pharynx: No oropharyngeal exudate.     Eyes:      General: No scleral icterus.     Conjunctiva/sclera: Conjunctivae normal.       Cardiovascular:      Rate and Rhythm: Normal rate and regular rhythm.      Heart sounds: Normal heart sounds. No murmur heard.     No friction rub. No gallop.   Pulmonary:      Effort: Pulmonary effort is normal. No respiratory distress.      Breath sounds: Normal breath sounds. No wheezing or rales.   Chest:      Chest wall: No tenderness.   Abdominal:      General: Bowel sounds are normal. There is no distension.      Palpations: Abdomen is soft.      Tenderness: There is no abdominal tenderness. There is no guarding.     Musculoskeletal:         General: No tenderness or deformity. Normal range of motion.      Cervical back: Normal range of motion and neck supple.     Skin:     General: Skin is warm and dry.      Findings: No erythema.     Neurological:      Mental Status: He is alert. Mental status is at baseline.         Discussion with Family: pt , declined family update    Discharge instructions/Information to patient and family:   See after visit " summary for information provided to patient and family.      Provisions for Follow-Up Care:  See after visit summary for information related to follow-up care and any pertinent home health orders.      Disposition:     Home    For Discharges to [facility]:   Not Applicable to this Patient - Not Applicable to this Patient    Planned Readmission: none     Discharge Statement:  I spent 60 minutes discharging the patient. This time was spent on the day of discharge. I had direct contact with the patient on the day of discharge. Greater than 50% of the total time was spent examining patient, answering all patient questions, arranging and discussing plan of care with patient as well as directly providing post-discharge instructions.  Additional time then spent on discharge activities.    Discharge Medications:  See after visit summary for reconciled discharge medications provided to patient and family.      ** Please Note: This note has been constructed using a voice recognition system **

## 2025-07-19 NOTE — ASSESSMENT & PLAN NOTE
Lab Results   Component Value Date    HGBA1C 5.5 06/16/2025       Recent Labs     07/18/25  1101 07/18/25  1539 07/18/25 2054 07/19/25  0727   POCGLU 229* 202* 236* 143*       Resume home meds

## 2025-07-19 NOTE — ASSESSMENT & PLAN NOTE
Diagnosed on 4/14/2025  Has been receiving inpatient high-dose methotrexate and rituximab and outpatient temozolomide 90 mg a day on day 7-11  Directly admitted for initiation of cycle 6  Completed patient chemotherapy  Will follow-up outpatient with oncology

## 2025-07-19 NOTE — PLAN OF CARE
Problem: Knowledge Deficit  Goal: Patient/family/caregiver demonstrates understanding of disease process, treatment plan, medications, and discharge instructions  Description: Complete learning assessment and assess knowledge base.  Interventions:  - Provide teaching at level of understanding  - Provide teaching via preferred learning methods  Outcome: Progressing     Problem: INFECTION - ADULT  Goal: Absence or prevention of progression during hospitalization  Description: INTERVENTIONS:  - Assess and monitor for signs and symptoms of infection  - Monitor lab/diagnostic results  - Monitor all insertion sites, i.e. indwelling lines, tubes, and drains  - Monitor endotracheal if appropriate and nasal secretions for changes in amount and color  - [place] appropriate cooling/warming therapies per order  - Administer medications as ordered  - Instruct and encourage patient and family to use good hand hygiene technique  - Identify and instruct in appropriate isolation precautions for identified infection/condition  Outcome: Progressing  Goal: Absence of fever/infection during neutropenic period  Description: INTERVENTIONS:  - Monitor WBC  - Perform strict hand hygiene  - Limit to healthy visitors only  - No plants, dried, fresh or silk flowers with carrion in patient room  Outcome: Progressing     Problem: DISCHARGE PLANNING  Goal: Discharge to home or other facility with appropriate resources  Description: INTERVENTIONS:  - Identify barriers to discharge w/patient and caregiver  - Arrange for needed discharge resources and transportation as appropriate  - Identify discharge learning needs (meds, wound care, etc.)  - Arrange for interpretive services to assist at discharge as needed  - Refer to Case Management Department for coordinating discharge planning if the patient needs post-hospital services based on physician/advanced practitioner order or complex needs related to functional status, cognitive ability, or social  support system  Outcome: Progressing

## 2025-07-21 NOTE — UTILIZATION REVIEW
NOTIFICATION OF ADMISSION DISCHARGE   This is a Notification of Discharge from [facility]. Please be advised that this patient has been discharge from our facility. Below you will find the admission and discharge date and time including the patient’s disposition.   UTILIZATION REVIEW CONTACT:  Utilization Review Assistants  Network Utilization Review Department  Phone: [phone] x carefully listen to the prompts. All voicemails are confidential.  Email: [email or web address]     ADMISSION INFORMATION  PRESENTATION DATE: 7/14/2025  7:59 AM  OBERVATION ADMISSION DATE: N/A  INPATIENT ADMISSION DATE: 7/14/25  7:59 AM   DISCHARGE DATE: 7/19/2025  1:28 PM   DISPOSITION:Home/Self Care    Network Utilization Review Department  ATTENTION: Please call with any questions or concerns to [phone] and carefully listen to the prompts so that you are directed to the right person. All voicemails are confidential.   For Discharge needs, contact Care Management DC Support Team at [phone] opt. 2  Send all requests for admission clinical reviews, approved or denied determinations and any other requests to dedicated fax number below belonging to the campus where the patient is receiving treatment. List of dedicated fax numbers for the Facilities:  FACILITY NAME UR FAX NUMBER   ADMISSION DENIALS (Administrative/Medical Necessity) [phone]   DISCHARGE SUPPORT TEAM ([facility]) [phone]   PARENT CHILD HEALTH (Maternity/NICU/Pediatrics) [phone]   [facility] [phone]   [facility] [phone]   [facility] [phone]   [facility] [phone]   [facility] [phone]   [facility] [phone]   [facility] [phone]   [facility] [phone]   [facility]  [facility] [phone]   [facility] [phone]   [facility] [phone]   [facility] [phone]

## 2025-07-22 ENCOUNTER — OFFICE VISIT (OUTPATIENT)
Facility: CLINIC | Age: 65
End: 2025-07-22
Payer: COMMERCIAL

## 2025-07-22 ENCOUNTER — OFFICE VISIT (OUTPATIENT)
Dept: SPEECH THERAPY | Facility: CLINIC | Age: 65
End: 2025-07-22
Attending: NURSE PRACTITIONER
Payer: COMMERCIAL

## 2025-07-22 ENCOUNTER — OFFICE VISIT (OUTPATIENT)
Age: 65
End: 2025-07-22
Payer: COMMERCIAL

## 2025-07-22 VITALS
WEIGHT: 184 LBS | RESPIRATION RATE: 18 BRPM | BODY MASS INDEX: 26.34 KG/M2 | SYSTOLIC BLOOD PRESSURE: 143 MMHG | HEIGHT: 70 IN | OXYGEN SATURATION: 99 % | TEMPERATURE: 97.3 F | DIASTOLIC BLOOD PRESSURE: 76 MMHG | HEART RATE: 92 BPM

## 2025-07-22 DIAGNOSIS — R49.0 HOARSE VOICE QUALITY: ICD-10-CM

## 2025-07-22 DIAGNOSIS — L23.7 POISON IVY: ICD-10-CM

## 2025-07-22 DIAGNOSIS — R41.841 COGNITIVE COMMUNICATION DEFICIT: ICD-10-CM

## 2025-07-22 DIAGNOSIS — K76.89 HEPATIC CYST: ICD-10-CM

## 2025-07-22 DIAGNOSIS — C83.390 PRIMARY CENTRAL NERVOUS SYSTEM (CNS) LYMPHOMA: Primary | ICD-10-CM

## 2025-07-22 DIAGNOSIS — G96.9 CNS LESION: Primary | ICD-10-CM

## 2025-07-22 DIAGNOSIS — R26.2 AMBULATORY DYSFUNCTION: ICD-10-CM

## 2025-07-22 PROCEDURE — 97129 THER IVNTJ 1ST 15 MIN: CPT

## 2025-07-22 PROCEDURE — 97130 THER IVNTJ EA ADDL 15 MIN: CPT

## 2025-07-22 PROCEDURE — 97110 THERAPEUTIC EXERCISES: CPT

## 2025-07-22 PROCEDURE — 99215 OFFICE O/P EST HI 40 MIN: CPT | Performed by: INTERNAL MEDICINE

## 2025-07-22 PROCEDURE — 97112 NEUROMUSCULAR REEDUCATION: CPT

## 2025-07-22 RX ORDER — SODIUM CHLORIDE 9 MG/ML
20 INJECTION, SOLUTION INTRAVENOUS ONCE AS NEEDED
OUTPATIENT
Start: 2025-08-04

## 2025-07-22 RX ORDER — LEUCOVORIN CALCIUM 25 MG/1
25 TABLET ORAL EVERY 6 HOURS
OUTPATIENT
Start: 2025-08-06

## 2025-07-22 RX ORDER — METHYLPREDNISOLONE 4 MG/1
TABLET ORAL
Qty: 1 EACH | Refills: 0 | Status: SHIPPED | OUTPATIENT
Start: 2025-07-22

## 2025-07-22 NOTE — PROGRESS NOTES
Name: Alexis Dennison      : 1960      MRN: [id number]  Encounter Provider: Leta Phan MD  Encounter Date: 2025   Encounter department: [facility]  :  Assessment & Plan  Primary central nervous system (CNS) lymphoma  See below       Hepatic cyst  CTA CAP (3/29/2025) showed nonspecific 12mm hypodense right hepatic lesion, recommended MRI abdomen  MRI (3/30/2025) showed no suspicious hepatic lesions, but demonstrated simple right hepatic lobe cyst       Primary central nervous system (CNS) lymphoma,  double expresser  DLBCL NOS, non-germinal center, 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 coli bacteremia, LETY, and transaminitis.     Overall, we will cancel his planned admission for  due to neutropenia and draw labs today.  We plan to reschedule to proceed with every 14 day high-dose MTX x2 additional infusions followed by restaging with PET CT and MRI Brain.  We recommend starting temozolomide as soon as patient obtains the medication which has received insurance authorization.  We will discontinue further rituximab.  We will consider referral to [place] for evaluation for bone marrow transplant.     Treatment Plan from 6/3/2025 forward:      Plan for CNS Lymphoma  HD MTX days 1 and 15 ; Rituxan days 3 and 17  and temodar 150 mg/m2 days 7-11 every 28 days-this will be C4D1 and C4D15      Prior admissions were done as follows:      Treatment for this patient to date was per the following publication for C1; C3  was amended as each treatment was considered a cycle so C3 is C2D1 and C4 is C2D15      High-dose MTX every  two weeks x4 cycles followed by every four weeks maintenance  followed by every four weeks maintenance + rituximab weekly x8 cycles (regimen modified from: https://pubmed.ncbi.nlm.nih.gov/63576354/)     Will do the current standard as above with the addition of temodar which patient will start on day 7    High-dose IV Methotrexate every 15 days until two cycles beyond achievement of complete response, disease progression, or unacceptable toxicity  C1 (4/18) 8 g/m²  C2 (5/2) 3 g/m², dose-reduced due to LETY, delayed x1 day due to E. coli bacteremia   C3 (5/21) 3 g/m², delayed due to recurrent E. coli bacteremia  C4 (6/4), 3 g/m², deferred due to neutropenia  C4 (6/16) 3 g/m²  C5 (6/30) 3.5 g/m²  C6 (7/14) 6 g  C7 (8/2) 3 g/m², planned    Rituximab  C1 (4/17) 375 mg/m²  C2 (4/24)  C3 (5/3)  C4 (5/10)  C5 (5/23)  Discontinue further cycles as completed adequate length of rituxumab after completes 6-8 cycles      Temozolomide  Insurance has been approved, advised patient to fill and begin taking with next treatment at the appropriate days 7-11 at 150 mg/m2      7/18/2025    Had Dose 6 recent admission at dose reduction 3 g/m2 due to renal issues    Has done well    Had recent imaging with CR Brain MRI 6/25/2025  Redemonstration of right frontal ventriculostomy tract with focal encephalomalacia, gliosis and hemosiderin deposition. There is associated T1 hyperintensity along the tract with minimal smooth along the proximal tract.     Left basal ganglia mass has improved region of T1 shortening in the left basal ganglia measuring up to 2.1 cm. No definite residual enhancement but evaluation is limited due to T1 shortening. Improved vasogenic edema with a small residual focus of FLAIR   hyperintensity that may represent gliosis rather than residual edema. No residual mass effect.     Leptomeningeal and ependymal nodules and enhancement have resolved including the previously seen 1 cm nodule within the left lateral ventricle  near the foramen of De Oliveira. No residual enhancement of the subcentimeter lesion in the left posterior centrum semiovale.     No new lesions or abnormal enhancement. No increased blood flow on perfusion weighted imaging.  Few small scattered foci of T2/FLAIR hyperintensity in the periventricular and subcortical matter likely due to mild chronic microangiopathy.  No restricted diffusion. No acute hemorrhage, mass effect, shift or herniation.     VENTRICLES: Ventricles are stable in size. Stable mild asymmetry of the left lateral ventricle. Intraventricular/subependymal hemosiderin deposition.     SELLA AND PITUITARY GLAND: Tiny cystic lesion in the posterior pituitary gland is better visualized on prior study. Otherwise unremarkable.     ORBITS:  Normal.     PARANASAL SINUSES: Mild maxillary sinus mucosal thickening.     VASCULATURE:  Evaluation of the major intracranial vasculature demonstrates appropriate flow voids.     CALVARIUM AND SKULL BASE:  Normal.     EXTRACRANIAL SOFT TISSUES:  Normal.     IMPRESSION:     Response to therapy.  T1 hyperintense left basal ganglia lesion is decreased in size. No definite residual enhancement but evaluation is limited due to T1 shortening. Surrounding vasogenic edema and mass effect improved.  Leptomeningeal/ependymal disease and small enhancing lesion in the left parietal lobe have resolved.     PET 6/13/2025 with     1.  Subtle asymmetric focal FDG activity involving the left cerebral hemisphere in the expected location of patient's known enhancing intracranial mass which could reflect mild hypermetabolic malignancy versus asymmetric physiologic activity involving   the left basal ganglia. Patient's known intracranial malignancy is better characterized on MRI of the brain and continued follow-up with MRI of the brain is recommended as clinically indicated.  2.  Mild nonspecific FDG activity associated with cutaneous/subcutaneous soft tissue density involving the left frontal  scalp which appears superficial to a siomara hole in the left frontal calvarium, possibly reflecting inflammatory activity. Correlation   with direct visualization is recommended to exclude the possibility of cutaneous/subcutaneous malignancy of low metabolic activity.  3.  New 6 mm left lower lobe lung nodule, possibly inflammatory. Short interval follow-up with CT of chest in 3 months is recommended to ensure stability.     Thus prior to dose 6, appears to have CR    Just started temodar with past 1-2 cycles (cycle is 2 doses, day 1 and day 15)     Maximum dosing MTX is 8 doses    He has had 6-may consider one more for temodar benefit as well    Plan to send to [facility] to discuss consolidation with auto BMT    RT could be considered also for consolidation     7/22/2025    While it is not possible to guarantee, based on the high success rate of this patient's regimen, and his excellent response, we suspect his most recent scans are showing only post-treatment changes and consider that he has now achieved a complete response.  We will treat for and additional 2 cycles past complete response per guidelines with his next infusion of high-dose MTX on 8/4.    Continue temozolomide, refilling Rx for next two cycles    Repeat PET CT and MRI Brain BT early September after his next two cycles of treatment    Office follow up in mid September after scans    Patient has appointment with Dr. Dial at [facility] in August to discuss consolidation with auto BMT    RT could be considered also for consolidation though would be less ideal    Ordered Medrol Dose Dick for diffuse poison ivy rash       No follow-ups on file.    History of Present Illness   No chief complaint on file.    Alexis Dennison is a 64 y.o. male with primary CNS (Dx 4/14/2025, double expressor (BCL2+/MYC+), DLBCL NOS, non-germinal center, Ki-67 60-70, BCL6 rearrangement), DM2 with baseline b/l LE neuropathy, NAFLD, and HTN with his treatment course of high-dose MTX + rituximab  + temozolomide patient is presenting today for inpatient planned chemotherapy mainly IV methotrexate and planned rituximab on day 3 and he is to start temozolomide on day 7-11     Patient seen and examined today during rounding with my attending, he is sitting in the recliner in no acute distress, flat affect noted, baseline and some mild memory deficit; he also noted some occasional dysuria, per him not all the time, denies any blood in the urine, otherwise review of system X12 was unremarkable    Interval History: 7/22/2025    Patient presents for 6-week follow-up last seen 6/3/2025.    Patient developed rash from poison ivy on his arms and trunk after looking for strawberries with his grandchildren.    He developed b/l LE 1+ pitting edema to the ankles.  PCP ordered Lasix 20 PO and TTE.  TTE was roughly WNL with EF 60% with some AV sclerosis.  He endorsed that the Lasix did not improve his swelling.  UAs at his most recent admission were negative for proteinuria.    His daughter on the phone endorsed that his voice is more coarse and has become softer recently    Most recent PET/CT (6/13/2025) showed subtle focal FDG activity of the left cerebral hemisphere in the location of the patient's known enhancing mass malignancy versus physiologic.  MRI brain BT (6/25/2025) shows response to therapy with resolution of leptomeningeal/ependymal disease and left parietal lobe lesion with decrease in size of left basal ganglia lesion with improved surrounding vasogenic edema and mass effect.    US thyroid (7/8/2025) had goal to biopsy 2 nodules though diagnostics identified it was actually 1 target nodule so only 1 biopsy was performed.  This pathology was benign.    Labs (7/19/2025) show WBC 4.4, Hb 9.3, MCV 96, , SCR 0.92, and other aspects of BMP roughly WNL.      Discussion    For patients with adequate fitness and kidney function, we recommend a high-dose methotrexate-based combination regimen rather than  high-dose methotrexate alone or primary radiation therapy  also including rituximab in induction regimens , although evidence is mixed.     Few trials have compared different chemotherapy combination regimens ; objective response (OR) rates for most regimens are similar. For most patients, treatment is with  either MTR (methotrexate at 8 g/m2, temozolomide, and rituximab) or R-MPV (rituximab, methotrexate at 3.5 g/m2, procarbazine, and vincristine.     Brain MRI with contrast should be performed at regular intervals (eg, every two to four doses of methotrexate) during induction. Goal is  a complete response (CR) before proceeding with consolidation therapy up to a usual maximum of eight doses of high-dose methotrexate.     Experts vary in the approach to patients with a partial response (MD) to induction methotrexate.     For patients with clinical and radiographic improvement yet residual enhancing disease on MRI despite up to eight doses of high-dose methotrexate, we most often give a cycle of high-dose cytarabine and, for patients with stable disease or better after cytarabine, proceed with further consolidation. An alternative for transplant-eligible patients with a MD is to proceed directly to high-dose chemotherapy.     The three options for consolidation in patients who have responded to induction are high-dose chemotherapy with autologous stem cell transplantation (HDC-ASCT), nonmyeloablative chemotherapy alone, and reduced-dose whole brain radiation therapy (WBRT).      Oncology History   Cancer Staging   No matching staging information was found for the patient.  Oncology History   Primary CNS lymphoma   4/16/2025 Initial Diagnosis    Primary CNS lymphoma     4/17/2025 -  Chemotherapy    leucovorin (WELLCOVORIN), 25 mg, 6 of 8 cycles  Administration: 25 mg (4/19/2025), 25 mg (4/19/2025), 25 mg (4/20/2025), 25 mg (4/20/2025), 25 mg (4/20/2025), 25 mg (4/21/2025), 25 mg (5/23/2025), 25 mg (5/24/2025), 25 mg  (5/24/2025), 25 mg (5/24/2025), 25 mg (5/24/2025), 25 mg (5/25/2025), 25 mg (5/25/2025), 25 mg (6/20/2025), 25 mg (6/20/2025), 25 mg (6/20/2025), 25 mg (6/20/2025), 25 mg (6/21/2025), 25 mg (6/21/2025), 25 mg (6/21/2025), 25 mg (6/21/2025), 25 mg (6/22/2025), 25 mg (6/22/2025), 25 mg (6/22/2025), 25 mg (7/3/2025), 25 mg (7/3/2025), 25 mg (7/3/2025), 25 mg (7/3/2025), 25 mg (7/4/2025), 25 mg (7/4/2025)  riTUXimab (RITUXAN) subsequent titrated chemo infusion, 765 mg (100 % of original dose 375 mg/m2), 1 of 1 cycle  Dose modification: 375 mg/m2 (original dose 375 mg/m2, Cycle 4)  Administration: 800 mg (6/19/2025)  leucovorin calcium IVPB, 25 mg, 6 of 8 cycles  Administration: 25 mg (5/3/2025), 25 mg (5/3/2025), 25 mg (5/4/2025), 25 mg (5/4/2025), 25 mg (5/4/2025), 25 mg (5/4/2025), 25 mg (7/1/2025), 25 mg (7/1/2025), 25 mg (7/2/2025), 25 mg (7/2/2025), 25 mg (7/2/2025), 25 mg (7/2/2025), 25 mg (7/15/2025), 25 mg (7/15/2025), 25 mg (7/16/2025), 25 mg (7/16/2025), 25 mg (7/16/2025), 25 mg (7/16/2025), 25 mg (6/18/2025), 25 mg (6/18/2025), 25 mg (6/19/2025), 25 mg (6/19/2025), 25 mg (6/19/2025), 25 mg (6/19/2025), 25 mg (5/22/2025), 25 mg (5/23/2025), 25 mg (5/23/2025), 25 mg (5/23/2025), 25 mg (5/24/2025)  methotrexate (PF) IVPB, 16,320 mg, 6 of 8 cycles  Dose modification: 8,000 mg/m2 (original dose 3,500 mg/m2, Cycle 2, Reason: Other (Must fill in a comment), Comment: Patient with aggressive PCNSL), 3,000 mg/m2 (original dose 3,500 mg/m2, Cycle 2, Reason: Other (Must fill in a comment), Comment: Elevated SCr)  Administration: 6,000 mg (5/2/2025), 6,000 mg (5/21/2025), 16,000 mg (4/18/2025), 6,000 mg (6/30/2025), 6,000 mg (6/17/2025), 6,000 mg (7/14/2025)  riTUXimab (RITUXAN) first titrated chemo infusion, 765 mg (100 % of original dose 375 mg/m2), 3 of 3 cycles  Dose modification: 375 mg/m2 (original dose 375 mg/m2, Cycle 1)  Administration: 800 mg (4/17/2025), 700 mg (4/24/2025), 800 mg (5/3/2025), 800 mg  (5/10/2025), 800 mg (5/23/2025)  riTUXimab-ABBS (TRUXIMA) first titrated infusion, 375 mg/m2 = 765 mg, 1 of 1 cycle       Review of Systems  ROS was performed and is negative except as indicated in HPI.    Medications Ordered Prior to Encounter[1]       Objective   There were no vitals taken for this visit.    Pain Screening:     ECOG     Physical Exam  General: AAOx4, well-appearing, improved mentation, no acute distress  HEENT: well-healing surgical scars, no ventricular drain, PERRLA, moist mucosa  Respiratory: CTAB w/o wheezes, rales, or rhonchi, no increased work of breathing  Cardiovascular: regular rhythm, bradycardia, w/o murmurs, 2+ radial and pedal pulses, no b/l LE edema  Abdomen: soft, non-tender, non-distended, no hepatomegaly or splenomegaly  /Rectal: deferred  Musculoskeletal: moves all four extremities with normal strength and ROM  Integumentary: warm, dry, no rash or bruising  Neurological: improved coordination  Psychiatric: pleasant and cooperative with normal mood, affect, and cognition    Labs: I have reviewed the following labs:  Lab Results   Component Value Date/Time    WBC 4.36 07/19/2025 05:03 AM    RBC 2.95 (L) 07/19/2025 05:03 AM    Hemoglobin 9.3 (L) 07/19/2025 05:03 AM    Hematocrit 28.3 (L) 07/19/2025 05:03 AM    MCV 96 07/19/2025 05:03 AM    MCH 31.5 07/19/2025 05:03 AM    RDW 14.4 07/19/2025 05:03 AM    Platelets 364 07/19/2025 05:03 AM    Segmented % 51 07/19/2025 05:03 AM    Lymphocytes % 38 07/19/2025 05:03 AM    Monocytes % 3 (L) 07/19/2025 05:03 AM    Eosinophils Relative 6 07/19/2025 05:03 AM    Basophils Relative 1 07/19/2025 05:03 AM    Immature Grans % 1 07/19/2025 05:03 AM    Absolute Neutrophils 2.26 07/19/2025 05:03 AM     Lab Results   Component Value Date/Time    Potassium 3.6 07/19/2025 05:03 AM    Chloride 103 07/19/2025 05:03 AM    CO2 32 07/19/2025 05:03 AM    BUN 10 07/19/2025 05:03 AM    Creatinine 0.92 07/19/2025 05:03 AM    Glucose, Fasting 115 (H) 07/10/2025  07:38 AM    Calcium 8.5 07/19/2025 05:03 AM    Corrected Calcium 8.8 07/02/2025 10:14 PM    AST 13 07/15/2025 06:19 AM    ALT 19 07/15/2025 06:19 AM    Alkaline Phosphatase 62 07/15/2025 06:19 AM    Total Protein 5.7 (L) 07/15/2025 06:19 AM    Albumin 3.7 07/15/2025 06:19 AM    Total Bilirubin 0.66 07/15/2025 06:19 AM    eGFR 86 07/19/2025 05:03 AM     I have spent a total time of 45 minutes in caring for this patient on the day of the visit/encounter including reviewing diagnostic results, prognosis, risks and benefits of tx options; instructions for management; patient and family education; risk factor reductions; impressions; counseling / coordination of care; documenting in the medical record; reviewing/placing orders in the medical record; obtaining or reviewing history; and communicating with other healthcare professionals.    This note has been generated by voice recognition software system. Therefore, there may be spelling, grammar, and or syntax errors. Please contact if questions arise.     Additional recommendations to follow per attending, Dr. Phan.    Bryson Garcia DO, PGY5  Hematology/Oncology Fellow         [1]   Current Outpatient Medications on File Prior to Visit   Medication Sig Dispense Refill    acetaminophen (TYLENOL) 325 mg tablet Take 2 tablets (650 mg total) by mouth every 6 (six) hours as needed for mild pain, moderate pain, severe pain, headaches or fever      allopurinol (ZYLOPRIM) 300 mg tablet Take 1 tablet (300 mg total) by mouth daily 30 tablet 0    atorvastatin (LIPITOR) 20 mg tablet Take 20 mg by mouth in the morning.      docusate sodium (COLACE) 100 mg capsule Take 1 capsule (100 mg total) by mouth 2 (two) times a day      leucovorin (WELLCOVORIN) 25 mg tablet Take 1 tablet (25 mg total) by mouth every 6 (six) hours for 4 doses 4 tablet 0    omeprazole (PriLOSEC) 20 mg delayed release capsule Take 1 capsule (20 mg total) by mouth daily      ondansetron (ZOFRAN) 8 mg tablet  Take 1 tablet (8 mg total) by mouth every 8 (eight) hours as needed for nausea or vomiting 20 tablet 0    QUEtiapine (SEROquel) 25 mg tablet Take 0.5 tablets (12.5 mg total) by mouth at noon and 0.5 tablets (12.5 mg total) before bedtime. 30 tablet 0    senna (SENOKOT) 8.6 mg Take 2 tablets (17.2 mg total) by mouth daily with lunch      SITaglip Phos-metFORMIN HCl ER (Janumet XR)  MG TB24 Take 1 tablet by mouth in the morning 90 tablet 1    temozolomide (TEMODAR) 20 mg capsule Take 2 capsules (40 mg total) by mouth daily On days 7 through 11 of each cycle 10 capsule 5    temozolomide (TEMODAR) 250 MG capsule Take 1 capsule (250 mg total) by mouth daily On days 7 through 11 of each cycle 5 capsule 5     No current facility-administered medications on file prior to visit.

## 2025-07-22 NOTE — PROGRESS NOTES
Speech-Language Pathology Daily Progress Note    Today's date: 2025   Patient’s name: Alexis Dennison  : 1960  MRN: [id number]  Safety measures:   Referring provider: Evita Lee CRNP    Encounter Diagnosis     ICD-10-CM    1. CNS lesion  G96.9       2. Cognitive communication deficit  R41.841           Subjective: Less Flat affect today, cooperative    Has 2 daughters and 1 son: Elizabeth, Marina & Ketan. Elizabeth lives in [place] with son and Marina. Ketan lives in [place], [place].     Short Term    Patient will be educated on word finding strategies (i.e., circumlocution) for improved generative naming and verbal expression skills.  7/10: Educated on word retrieval strategies. Provided handout. Practiced utilizing strategies. Utilized practicing description technique with list of words - minimal - moderate cues provided.     Patient will complete complex auditory attention processing tasks (e.g., sentence unscramble, ranking numbers/words, etc.) to improve working memory with 80% accuracy.  : Pairing Items: Performed well with 4 words & putting into pairs 66% accuracy increased to 100% with repetition x 1 and visualization on one set. Formulation of sentences given words, able to complete this with 2-3 words effectively. Try 4 words next time.     Patient will facilitate planning by completing thought organization tasks (e.g., sequencing, deduction puzzles, etc.) with 80% accuracy to facilitate increased executive functioning, working memory, problem solving, and processing skills.    Patient will complete auditory immediate and short term memory tasks to 80% accuracy to facilitate increased ability to retell narratives and recall information within functional living environment.    To target mental manipulation and working memory, patient will participate in word finding activity (i.e., anagrams) with 80% accuracy.    Patient will answer questions regarding story read aloud with 80% accuracy to  facilitate improved auditory comprehension and recall.    Patient will complete reading comprehension tasks (e.g., answering questions, following complex written directions, etc.) to 80% accuracy to facilitate carryover of comprehension of functional reading materials.    Patient will demonstrate divided attention by responding to multiple tasks or details within tasks at the same time with min cues in a distracting environment with 80% accuracy.    Patient will complete concrete and abstract categorization tasks to 80% accuracy to facilitate improved generative naming skills and working memory.  7/22: Completed screener for word relationships- 95% accuracy, did provide additional analogies sheets for hw. Able to provide list of opposites out loud, 90% accuracy. Would benefit from fluency naming tasks, concrete /abstract categories list.     Patient will generate sentences and short paragraphs (e.g., sentence generation given words to incorporate, description of pictures, etc.) with fewer than 3 errors in grammar, organization, spelling and content with 80% accuracy to facilitate increased carryover of skills into functional living environment.    Patient will be educated on the use of internal and external memory aids and compensatory strategies with 80% accuracy to facilitate increased recall of routine, personal information, and recent events.  7/10: Completed education of memory strategies. Provided handout.          Long Term      Patient will complete cognitive-linguistic therapy that addresses patient's specific deficits in processing speed, short-term working memory, attention to detail, monitoring, sequencing, and organization skills, with instruction, to alleviate effects of executive functioning disorder deficits by discharge.      Patient will develop functional, cognitive-linguistic based skills and utilize compensatory strategies as needed to communicate effectively to different conversational  partners, maintain safety, and participate socially by discharge.     Patient will improve ability to facilitate cognitive function and communication skills including use of compensatory strategies in a variety of functional living tasks to improve quality of like and to maximize level of independence.           Visit Tracking:  POC   Expires Auth Expiration Date ST Visit Limit   9/2/25  BOMN          Visit/Unit Tracking:  Auth Status Date 7/8 7/10 7/22   BOMN Used 1 2 3    Remaining          Intervention Comments:  Word retrieval and working memory and short term recall strategies. Provided adult book- word relationships for home practice.

## 2025-07-22 NOTE — PROGRESS NOTES
Daily Note     Today's date: 2025  Patient name: Alexis Dennison  : 1960  MRN: [id number]  Referring provider: Evita Lee CRNP  Dx:   Encounter Diagnosis     ICD-10-CM    1. 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 Ambulatory dysfunction  R26.2           Start Time: 845  Stop Time: 927  Total time in clinic (min): 42 minutes    Subjective: Alexis notes last session he did not have too much soreness. Chemo last week was tiring and boring in the hospital. Has cancer doctor appt later today.      Objective: See treatment diary below      Assessment:  Pt shows good form with exercise progression, reporting fatigue near end of most sets. Shows occasional mis-stepping with tasks requiring more stability. Improving power development and stability picking items up from ground. Patient would benefit from continued PT      Plan: Continue per plan of care. Cont aerobic and power development.     Daily Treatment Diary     Precautions: undergone chemo,   CO-MORBIDITIES: CNS lymphoma, T2DM, anemia  HEP ACCESS CODE:     POC Expires Reeval for Medicare to be completed  Unit Limit Auth Expiration Date PT/OT/STVisit Limit    By visit na bomn  30    Completed on visit                    Auth Status DATE 7/8 7/10 7/22      approved Visit # 1 2 3       Remaining 29 28 27      Testing         6MWT 1280ft        30s chair stand/5xSTS 14.71 / 10        TUG/TUGC 11.10        FGA/MiniBest 27        10mWT 1.23                 THERAPEUTIC EXERCISE HEP         Aerobic   TM 6% 2.1mph max hR 125bpm TM 2.2mph 4% 8min      STS Squats, lat step up, step up, daily walking  Tidal tank 3x10 1x7, 2x9 15lb kb      Deadlift    Tidal tank 2x10      SLRx4          HR/TR          Leg Press          Ball toss   8lb med ball chest pass/OH pass x12 total 8lb med ball chest pass/ OH pass x15 total      NEUROMUSCULAR REEDUCATION           Dynamic balance   Mini tidal tank hold x30 step up 6'    Lat step BTB at  forefoot 20ft x4 ea direction    8lb med ball OH carry 20ft x6 Mini tidal tank hold 8' step 2x10 BLE w SLS    Lat step BTB at forefoot 25ft x3 ea direction    Tidal tank OH hold 20ft x4      Static balance          Obstacle course          Resisted stepping   44lb fwd/bwd x20 ea       Perturbation training                                                                                                    THERAPEUTIC ACTIVITY                                                  GAIT TRAINING          Stairs          No AD/SPC                              Manuals

## 2025-07-24 ENCOUNTER — OFFICE VISIT (OUTPATIENT)
Dept: SPEECH THERAPY | Facility: CLINIC | Age: 65
End: 2025-07-24
Attending: NURSE PRACTITIONER
Payer: COMMERCIAL

## 2025-07-24 ENCOUNTER — APPOINTMENT (OUTPATIENT)
Facility: CLINIC | Age: 65
End: 2025-07-24
Payer: COMMERCIAL

## 2025-07-24 DIAGNOSIS — R41.841 COGNITIVE COMMUNICATION DEFICIT: ICD-10-CM

## 2025-07-24 DIAGNOSIS — G96.9 CNS LESION: Primary | ICD-10-CM

## 2025-07-24 PROCEDURE — 97129 THER IVNTJ 1ST 15 MIN: CPT

## 2025-07-24 PROCEDURE — 97130 THER IVNTJ EA ADDL 15 MIN: CPT

## 2025-07-24 NOTE — PROGRESS NOTES
Speech-Language Pathology Daily Progress Note    Today's date: 2025   Patient’s name: Alexis Dennison  : 1960  MRN: [id number]  Safety measures:   Referring provider: Evita Lee CRNP    Encounter Diagnosis     ICD-10-CM    1. CNS lesion  G96.9       2. Cognitive communication deficit  R41.841           Subjective:      Short Term    Patient will be educated on word finding strategies (i.e., circumlocution) for improved generative naming and verbal expression skills.  7/10: Educated on word retrieval strategies. Provided handout. Practiced utilizing strategies. Utilized practicing description technique with list of words - minimal - moderate cues provided.      Patient will complete complex auditory attention processing tasks (e.g., sentence unscramble, ranking numbers/words, etc.) to improve working memory with 80% accuracy.  : Pairing Items: Performed well with 4 words & putting into pairs 66% accuracy increased to 100% with repetition x 1 and visualization on one set. Formulation of sentences given words, able to complete this with 2-3 words effectively. Try 4 words next time.  : Formulation of sentences from set of 4 words: able to complete when words written in front of him. Coding of paired words: Color coding: Tried to code with set of 10, 5 and 3 with repeated attempts and max cues, but patient unable to complete this task secondary to poor working memory.  Performed much better with putting 3 words in natural order and increased time and efforts with placing 3 scrambled words into a sentence but able to complete & continue these tasks.     Patient will facilitate planning by completing thought organization tasks (e.g., sequencing, deduction puzzles, etc.) with 80% accuracy to facilitate increased executive functioning, working memory, problem solving, and processing skills.  : Organizing visual logical solution problems: Marcellus's Closet: Moderate initial cueing but able to solve  then with Kitchen Shelves activity, intermittent moderate cueing but able to solve. Provided Garden plot for home exercise.     Patient will complete auditory immediate and short term memory tasks to 80% accuracy to facilitate increased ability to retell narratives and recall information within functional living environment.    To target mental manipulation and working memory, patient will participate in word finding activity (i.e., anagrams) with 80% accuracy.    Patient will answer questions regarding story read aloud with 80% accuracy to facilitate improved auditory comprehension and recall.    Patient will complete reading comprehension tasks (e.g., answering questions, following complex written directions, etc.) to 80% accuracy to facilitate carryover of comprehension of functional reading materials.    Patient will demonstrate divided attention by responding to multiple tasks or details within tasks at the same time with min cues in a distracting environment with 80% accuracy.    Patient will complete concrete and abstract categorization tasks to 80% accuracy to facilitate improved generative naming skills and working memory.  7/22: Completed screener for word relationships- 95% accuracy, did provide additional analogies sheets for hw. Able to provide list of opposites out loud, 90% accuracy. Would benefit from fluency naming tasks, concrete /abstract categories list.     Patient will generate sentences and short paragraphs (e.g., sentence generation given words to incorporate, description of pictures, etc.) with fewer than 3 errors in grammar, organization, spelling and content with 80% accuracy to facilitate increased carryover of skills into functional living environment.    Patient will be educated on the use of internal and external memory aids and compensatory strategies with 80% accuracy to facilitate increased recall of routine, personal information, and recent events.  7/10: Completed education of  memory strategies. Provided handout.          Long Term      Patient will complete cognitive-linguistic therapy that addresses patient's specific deficits in processing speed, short-term working memory, attention to detail, monitoring, sequencing, and organization skills, with instruction, to alleviate effects of executive functioning disorder deficits by discharge.      Patient will develop functional, cognitive-linguistic based skills and utilize compensatory strategies as needed to communicate effectively to different conversational partners, maintain safety, and participate socially by discharge.     Patient will improve ability to facilitate cognitive function and communication skills including use of compensatory strategies in a variety of functional living tasks to improve quality of like and to maximize level of independence.           Visit Tracking:  POC   Expires Auth Expiration Date ST Visit Limit   9/2/25  BOMN          Visit/Unit Tracking:  Auth Status Date 7/8 7/10 7/22 7/24   BOMN Used 1 2 3 4    Remaining           Intervention Comments:  Word retrieval and working memory and short term recall strategies. Provided adult book- word relationships for home practice.

## 2025-07-27 RX ORDER — CEFADROXIL 500 MG/1
1000 CAPSULE ORAL EVERY 12 HOURS SCHEDULED
Qty: 10 CAPSULE | Refills: 0 | OUTPATIENT
Start: 2025-07-27

## 2025-07-29 ENCOUNTER — OFFICE VISIT (OUTPATIENT)
Facility: CLINIC | Age: 65
End: 2025-07-29
Payer: COMMERCIAL

## 2025-07-29 ENCOUNTER — OFFICE VISIT (OUTPATIENT)
Dept: SPEECH THERAPY | Facility: CLINIC | Age: 65
End: 2025-07-29
Attending: NURSE PRACTITIONER
Payer: COMMERCIAL

## 2025-07-29 DIAGNOSIS — G96.9 CNS LESION: Primary | ICD-10-CM

## 2025-07-29 DIAGNOSIS — R41.841 COGNITIVE COMMUNICATION DEFICIT: ICD-10-CM

## 2025-07-29 DIAGNOSIS — C83.390 PRIMARY CENTRAL NERVOUS SYSTEM (CNS) LYMPHOMA: Primary | ICD-10-CM

## 2025-07-29 DIAGNOSIS — R26.2 AMBULATORY DYSFUNCTION: ICD-10-CM

## 2025-07-29 PROCEDURE — 97130 THER IVNTJ EA ADDL 15 MIN: CPT

## 2025-07-29 PROCEDURE — 97129 THER IVNTJ 1ST 15 MIN: CPT

## 2025-07-29 PROCEDURE — 97112 NEUROMUSCULAR REEDUCATION: CPT

## 2025-07-29 PROCEDURE — 97110 THERAPEUTIC EXERCISES: CPT

## 2025-07-29 RX ORDER — SODIUM BICARBONATE 650 MG/1
650 TABLET ORAL 4 TIMES DAILY
Qty: 8 TABLET | Refills: 0 | Status: SHIPPED | OUTPATIENT
Start: 2025-08-02 | End: 2025-08-09

## 2025-07-31 ENCOUNTER — APPOINTMENT (OUTPATIENT)
Dept: LAB | Facility: HOSPITAL | Age: 65
End: 2025-07-31
Payer: COMMERCIAL

## 2025-07-31 ENCOUNTER — OFFICE VISIT (OUTPATIENT)
Dept: SPEECH THERAPY | Facility: CLINIC | Age: 65
End: 2025-07-31
Attending: NURSE PRACTITIONER
Payer: COMMERCIAL

## 2025-07-31 ENCOUNTER — APPOINTMENT (OUTPATIENT)
Facility: CLINIC | Age: 65
End: 2025-07-31
Payer: COMMERCIAL

## 2025-07-31 DIAGNOSIS — R41.841 COGNITIVE COMMUNICATION DEFICIT: ICD-10-CM

## 2025-07-31 DIAGNOSIS — G96.9 CNS LESION: Primary | ICD-10-CM

## 2025-07-31 DIAGNOSIS — C83.390 PRIMARY CENTRAL NERVOUS SYSTEM (CNS) LYMPHOMA: ICD-10-CM

## 2025-07-31 LAB
ALBUMIN SERPL BCG-MCNC: 4.5 G/DL (ref 3.5–5)
ALP SERPL-CCNC: 68 U/L (ref 34–104)
ALT SERPL W P-5'-P-CCNC: 14 U/L (ref 7–52)
ANION GAP SERPL CALCULATED.3IONS-SCNC: 6 MMOL/L (ref 4–13)
AST SERPL W P-5'-P-CCNC: 12 U/L (ref 13–39)
BASOPHILS # BLD AUTO: 0.04 THOUSANDS/ÂΜL (ref 0–0.1)
BASOPHILS NFR BLD AUTO: 1 % (ref 0–1)
BILIRUB SERPL-MCNC: 0.51 MG/DL (ref 0.2–1)
BILIRUB UR QL STRIP: NEGATIVE
BUN SERPL-MCNC: 20 MG/DL (ref 5–25)
CALCIUM SERPL-MCNC: 9.9 MG/DL (ref 8.4–10.2)
CHLORIDE SERPL-SCNC: 103 MMOL/L (ref 96–108)
CLARITY UR: CLEAR
CO2 SERPL-SCNC: 32 MMOL/L (ref 21–32)
COLOR UR: YELLOW
CREAT SERPL-MCNC: 1.21 MG/DL (ref 0.6–1.3)
EOSINOPHIL # BLD AUTO: 0.34 THOUSAND/ÂΜL (ref 0–0.61)
EOSINOPHIL NFR BLD AUTO: 5 % (ref 0–6)
ERYTHROCYTE [DISTWIDTH] IN BLOOD BY AUTOMATED COUNT: 13.8 % (ref 11.6–15.1)
GFR SERPL CREATININE-BSD FRML MDRD: 62 ML/MIN/1.73SQ M
GLUCOSE P FAST SERPL-MCNC: 108 MG/DL (ref 65–99)
GLUCOSE UR STRIP-MCNC: NEGATIVE MG/DL
HCT VFR BLD AUTO: 37 % (ref 36.5–49.3)
HGB BLD-MCNC: 12.1 G/DL (ref 12–17)
HGB UR QL STRIP.AUTO: NEGATIVE
IMM GRANULOCYTES # BLD AUTO: 0.04 THOUSAND/UL (ref 0–0.2)
IMM GRANULOCYTES NFR BLD AUTO: 1 % (ref 0–2)
KETONES UR STRIP-MCNC: NEGATIVE MG/DL
LEUKOCYTE ESTERASE UR QL STRIP: NEGATIVE
LYMPHOCYTES # BLD AUTO: 2.22 THOUSANDS/ÂΜL (ref 0.6–4.47)
LYMPHOCYTES NFR BLD AUTO: 35 % (ref 14–44)
MCH RBC QN AUTO: 31.3 PG (ref 26.8–34.3)
MCHC RBC AUTO-ENTMCNC: 32.7 G/DL (ref 31.4–37.4)
MCV RBC AUTO: 96 FL (ref 82–98)
MONOCYTES # BLD AUTO: 0.54 THOUSAND/ÂΜL (ref 0.17–1.22)
MONOCYTES NFR BLD AUTO: 9 % (ref 4–12)
NEUTROPHILS # BLD AUTO: 3.16 THOUSANDS/ÂΜL (ref 1.85–7.62)
NEUTS SEG NFR BLD AUTO: 49 % (ref 43–75)
NITRITE UR QL STRIP: NEGATIVE
NRBC BLD AUTO-RTO: 0 /100 WBCS
PH UR STRIP.AUTO: 5.5 [PH]
PLATELET # BLD AUTO: 261 THOUSANDS/UL (ref 149–390)
PMV BLD AUTO: 10.2 FL (ref 8.9–12.7)
POTASSIUM SERPL-SCNC: 4.2 MMOL/L (ref 3.5–5.3)
PROT SERPL-MCNC: 6.6 G/DL (ref 6.4–8.4)
PROT UR STRIP-MCNC: NEGATIVE MG/DL
RBC # BLD AUTO: 3.86 MILLION/UL (ref 3.88–5.62)
SODIUM SERPL-SCNC: 141 MMOL/L (ref 135–147)
SP GR UR STRIP.AUTO: >=1.03 (ref 1–1.03)
UROBILINOGEN UR STRIP-ACNC: <2 MG/DL
WBC # BLD AUTO: 6.34 THOUSAND/UL (ref 4.31–10.16)

## 2025-07-31 PROCEDURE — 85025 COMPLETE CBC W/AUTO DIFF WBC: CPT

## 2025-07-31 PROCEDURE — 80053 COMPREHEN METABOLIC PANEL: CPT

## 2025-07-31 PROCEDURE — 36415 COLL VENOUS BLD VENIPUNCTURE: CPT

## 2025-07-31 PROCEDURE — 81003 URINALYSIS AUTO W/O SCOPE: CPT

## 2025-07-31 PROCEDURE — 97130 THER IVNTJ EA ADDL 15 MIN: CPT

## 2025-07-31 PROCEDURE — 97129 THER IVNTJ 1ST 15 MIN: CPT

## 2025-08-01 ENCOUNTER — TELEPHONE (OUTPATIENT)
Dept: HEMATOLOGY ONCOLOGY | Facility: CLINIC | Age: 65
End: 2025-08-01

## 2025-08-04 ENCOUNTER — HOSPITAL ENCOUNTER (INPATIENT)
Facility: HOSPITAL | Age: 65
LOS: 5 days | Discharge: HOME | DRG: 847 | End: 2025-08-09
Attending: INTERNAL MEDICINE | Admitting: INTERNAL MEDICINE
Payer: COMMERCIAL

## 2025-08-05 PROBLEM — R79.89 ELEVATED LFTS: Status: ACTIVE | Noted: 2025-08-05

## 2025-08-14 ENCOUNTER — OFFICE VISIT (OUTPATIENT)
Facility: CLINIC | Age: 65
End: 2025-08-14
Payer: COMMERCIAL

## 2025-08-14 ENCOUNTER — APPOINTMENT (OUTPATIENT)
Dept: LAB | Facility: HOSPITAL | Age: 65
End: 2025-08-14
Payer: COMMERCIAL

## 2025-08-15 ENCOUNTER — TELEPHONE (OUTPATIENT)
Dept: HEMATOLOGY ONCOLOGY | Facility: CLINIC | Age: 65
End: 2025-08-15

## 2025-08-19 ENCOUNTER — TELEPHONE (OUTPATIENT)
Age: 65
End: 2025-08-19

## 2025-08-21 ENCOUNTER — TELEPHONE (OUTPATIENT)
Age: 65
End: 2025-08-21

## (undated) DEVICE — 3M™ IOBAN™ 2 ANTIMICROBIAL INCISE DRAPE 6650EZ: Brand: IOBAN™ 2

## (undated) DEVICE — OCCLUSIVE GAUZE STRIP,3% BISMUTH TRIBROMOPHENATE IN PETROLATUM BLEND: Brand: XEROFORM

## (undated) DEVICE — INTENDED FOR TISSUE SEPARATION, AND OTHER PROCEDURES THAT REQUIRE A SHARP SURGICAL BLADE TO PUNCTURE OR CUT.: Brand: BARD-PARKER SAFETY BLADES SIZE 15, STERILE

## (undated) DEVICE — PERFORATOR CRANIAL 14MM

## (undated) DEVICE — GLOVE INDICATOR PI UNDERGLOVE SZ 6.5 BLUE

## (undated) DEVICE — SPECIMEN CONTAINER STERILE PEEL PACK

## (undated) DEVICE — DRAPE CAMERA/LASER

## (undated) DEVICE — GLOVE SRG BIOGEL 6

## (undated) DEVICE — DECANTER: Brand: UNBRANDED

## (undated) DEVICE — SKN PRP WNG SPNGE PVP SCRB STR: Brand: MEDLINE INDUSTRIES, INC.

## (undated) DEVICE — ADHESIVE SKIN HIGH VISCOSITY EXOFIN 1ML

## (undated) DEVICE — BIPOLAR ELECTRODE, 1.3MM, STERILE: Brand: N.A.

## (undated) DEVICE — ICP CATH BACTISEAL EVD LRG 1.9MM X 35CM

## (undated) DEVICE — MAYFIELD® DISPOSABLE ADULT SKULL PIN (PLASTIC BASE): Brand: MAYFIELD®

## (undated) DEVICE — PACK CRANIOTOMY PBDS RF

## (undated) DEVICE — PLUMEPEN PRO 10FT

## (undated) DEVICE — 3M™ TEGADERM™ TRANSPARENT FILM DRESSING FRAME STYLE, 1624W, 2-3/8 IN X 2-3/4 IN (6 CM X 7 CM), 100/CT 4CT/CASE: Brand: 3M™ TEGADERM™

## (undated) DEVICE — REM POLYHESIVE ADULT PATIENT RETURN ELECTRODE: Brand: VALLEYLAB

## (undated) DEVICE — ANTIBACTERIAL VIOLET BRAIDED (POLYGLACTIN 910), SYNTHETIC ABSORBABLE SUTURE: Brand: COATED VICRYL

## (undated) DEVICE — SURGIFOAM 8.5 X 12.5

## (undated) DEVICE — LIGHT HANDLE COVER SLEEVE DISP BLUE STELLAR

## (undated) DEVICE — PREP SURGICAL PURPREP 26ML

## (undated) DEVICE — HYDROPHILIC WOUND DRESSING WITH ZINC PLUS VITAMINS A AND B6.: Brand: DERMAGRAN®-B

## (undated) DEVICE — GLOVE INDICATOR PI UNDERGLOVE SZ 8 BLUE

## (undated) DEVICE — GLOVE SRG BIOGEL 6.5

## (undated) DEVICE — SHEATH 1912000 5PK 4MM/0DEG STORZ XOMED: Brand: ENDO-SCRUB®

## (undated) DEVICE — NEPTUNE E-SEP SMOKE EVACUATION PENCIL, COATED, 70MM BLADE, PUSH BUTTON SWITCH: Brand: NEPTUNE E-SEP

## (undated) DEVICE — ELECTRODE BLADE MOD E-Z CLEAN  2.75IN 7CM -0012AM

## (undated) DEVICE — SLIM BODY SKIN STAPLER: Brand: APPOSE ULC

## (undated) DEVICE — GLOVE SRG BIOGEL ECLIPSE 8

## (undated) DEVICE — CHLORAPREP HI-LITE 26ML ORANGE

## (undated) DEVICE — INTENDED FOR TISSUE SEPARATION, AND OTHER PROCEDURES THAT REQUIRE A SHARP SURGICAL BLADE TO PUNCTURE OR CUT.: Brand: BARD-PARKER ® CARBON RIB-BACK BLADES

## (undated) DEVICE — DRAPE SHEET THREE QUARTER

## (undated) DEVICE — INSULATED BLADE ELECTRODE: Brand: EDGE

## (undated) DEVICE — POV-IOD LIQUID POUCH 0.75OZ

## (undated) DEVICE — DRAPE INTESTINAL ISOLATION BAG

## (undated) DEVICE — GAUZE SPONGES,16 PLY: Brand: CURITY

## (undated) DEVICE — SUT VICRYL 3-0 SH 27 IN J416H

## (undated) DEVICE — SUT MONOCRYL 4-0 PS-2 27 IN Y426H

## (undated) DEVICE — MARKER REFLECTIVE RADIOPAQUE SPHERE

## (undated) DEVICE — MONITORR™ ICP EXTERNAL CSF DRAINAGE AND MONITORING SYSTEM: Brand: MONITORR™